# Patient Record
Sex: MALE | Race: WHITE | NOT HISPANIC OR LATINO | Employment: OTHER | URBAN - METROPOLITAN AREA
[De-identification: names, ages, dates, MRNs, and addresses within clinical notes are randomized per-mention and may not be internally consistent; named-entity substitution may affect disease eponyms.]

---

## 2017-01-06 ENCOUNTER — OFFICE VISIT (OUTPATIENT)
Dept: FAMILY MEDICINE | Facility: CLINIC | Age: 72
End: 2017-01-06
Payer: COMMERCIAL

## 2017-01-06 VITALS
BODY MASS INDEX: 26.27 KG/M2 | HEART RATE: 75 BPM | OXYGEN SATURATION: 96 % | RESPIRATION RATE: 22 BRPM | HEIGHT: 70 IN | DIASTOLIC BLOOD PRESSURE: 62 MMHG | TEMPERATURE: 97.9 F | SYSTOLIC BLOOD PRESSURE: 116 MMHG | WEIGHT: 183.5 LBS

## 2017-01-06 DIAGNOSIS — J44.1 COPD EXACERBATION (H): Primary | ICD-10-CM

## 2017-01-06 PROCEDURE — 99213 OFFICE O/P EST LOW 20 MIN: CPT | Performed by: INTERNAL MEDICINE

## 2017-01-06 RX ORDER — PREDNISONE 20 MG/1
10 TABLET ORAL DAILY
Qty: 10 TABLET | Refills: 0 | Status: SHIPPED | OUTPATIENT
Start: 2017-01-06 | End: 2017-01-18

## 2017-01-06 NOTE — MR AVS SNAPSHOT
After Visit Summary   1/6/2017    Harrison Thomas    MRN: [id number]           Patient Information     Date Of Birth          1945        Visit Information        Provider Department      1/6/2017 10:30 AM Yaneli Dozier MD [facility]        Today's Diagnoses     COPD exacerbation (H)    -  1       Care Instructions    Incruse Ellipta 1 inhalation a day    Prednisone 10 mg daily for 10 days    Continue Symbicort    Take Proair/Albuterol  inhaler 2 puffs 4 times daily if needed or before exercise        Follow-ups after your visit        Your next 10 appointments already scheduled     Jan 18, 2017  9:45 AM   Return Visit with Abimael Fatima MD   [facility] PHYSICIANS [facility] [place] ([facility])    [id number]  [facility] [address] [phone]            May 15, 2017  9:30 AM   [facility]    [address] [facility] [id number] [phone]           Bring a current list of meds and any records pertaining to this visit.  For Physicals, please bring immunization records and any forms needing to be filled out.  Please arrive 10 minutes early to complete paperwork.              Who to contact     If you have questions or need follow up information about today's clinic visit or your schedule please contact [facility] directly at [phone].  Normal or non-critical lab and imaging results will be communicated to you by MyChart, letter or phone within 4 business days after the clinic has received the results. If you do not hear from us within 7 days, please contact the clinic through MyChart or phone. If you have a critical or abnormal lab result, we will notify you by phone as soon as possible.  Submit refill requests through Mod[employer] or call your pharmacy and they will forward the refill request to us. Please allow 3 business days for your  "refill to be completed.          Additional Information About Your Visit        [employer] Information     [employer] gives you secure access to your electronic health record. If you see a primary care provider, you can also send messages to your care team and make appointments. If you have questions, please call your primary care clinic.  If you do not have a primary care provider, please call [phone] and they will assist you.        Care EveryWhere ID     This is your Care EveryWhere ID. This could be used by other organizations to access your [facility] medical records  [id number]        Your Vitals Were     Pulse Temperature Respirations Height BMI (Body Mass Index) Pulse Oximetry    75 97.9  F (36.6  C) (Oral) 22 5' 10\" (1.778 m) 26.33 kg/m2 96%       Blood Pressure from Last 3 Encounters:   01/06/17 116/62   12/22/16 138/70   11/23/16 134/75    Weight from Last 3 Encounters:   01/06/17 183 lb 8 oz (83.235 kg)   12/22/16 180 lb (81.647 kg)   11/23/16 182 lb (82.555 kg)              Today, you had the following     No orders found for display         Today's Medication Changes          These changes are accurate as of: 1/6/17 10:55 AM.  If you have any questions, ask your nurse or doctor.               Start taking these medicines.        Dose/Directions    umeclidinium 62.5 MCG/INH oral inhaler   Commonly known as:  INCRUSE ELLIPTA   Used for:  COPD exacerbation (H)   Started by:  Yaneli Dozier MD        Dose:  1 puff   Inhale 1 puff into the lungs daily   Quantity:  1 Inhaler   Refills:  3         These medicines have changed or have updated prescriptions.        Dose/Directions    * predniSONE 10 MG tablet   Commonly known as:  DELTASONE   This may have changed:  Another medication with the same name was added. Make sure you understand how and when to take each.   Used for:  COPD exacerbation (H)   Changed by:  Yaneli Dozier MD        4 tabs daily for 2 days, then 3 tabs daily for 2 days, then 2 tabs daily " for 2 days, then 1 tab daily for 2 days, then stop   Quantity:  20 tablet   Refills:  0       * predniSONE 20 MG tablet   Commonly known as:  DELTASONE   This may have changed:  You were already taking a medication with the same name, and this prescription was added. Make sure you understand how and when to take each.   Used for:  COPD exacerbation (H)   Changed by:  Yaneli Dozier MD        Dose:  10 mg   Take 0.5 tablets (10 mg) by mouth daily   Quantity:  10 tablet   Refills:  0       * Notice:  This list has 2 medication(s) that are the same as other medications prescribed for you. Read the directions carefully, and ask your doctor or other care provider to review them with you.      Stop taking these medicines if you haven't already. Please contact your care team if you have questions.     cefUROXime 500 MG tablet   Commonly known as:  CEFTIN   Stopped by:  Yaneli Dozier MD           doxycycline 100 MG capsule   Commonly known as:  VIBRAMYCIN   Stopped by:  Yaneli Dozier MD           levalbuterol 0.31 MG/3ML neb solution   Commonly known as:  XOPENEX   Stopped by:  Yaneli Dozier MD                Where to get your medicines      These medications were sent to [facility]/pharmacy #[id number] - [facility] [address] [place] [address]     Phone:  [phone]    - predniSONE 20 MG tablet  - umeclidinium 62.5 MCG/INH oral inhaler             Primary Care Provider Office Phone # Fax #    Yaneli Dozier [phone]       [facility] [address] [address]        Thank you!     Thank you for choosing [facility]  for your care. Our goal is always to provide you with excellent care. Hearing back from our patients is one way we can continue to improve our services. Please take a few minutes to complete the written survey that you may receive in the mail after your visit with us. Thank you!             Your Updated Medication List  - Protect others around you: Learn how to safely use, store and throw away your medicines at www.disposemymeds.org.          This list is accurate as of: 1/6/17 10:55 AM.  Always use your most recent med list.                   Brand Name Dispense Instructions for use    albuterol 108 (90 BASE) MCG/ACT Inhaler    albuterol    25.5 Inhaler    INHALE 2 PUFFS INTO THE LUNGS EVERY 6 HOURS AS NEEDED FOR SHORTNESS OF BREATH / DYSPNEA OR WHEEZING       alfuzosin 10 MG 24 hr tablet    UROXATRAL    90 tablet    Take 1 tablet (10 mg) by mouth daily After a meal       aspirin 81 MG tablet      Take 1 tablet by mouth 2 times daily       budesonide-formoterol 160-4.5 MCG/ACT Inhaler    SYMBICORT    3 Inhaler    Inhale 2 puffs into the lungs 2 times daily       * carisoprodol 350 MG tablet    SOMA    30 tablet    TAKE 1 TABLET BY MOUTH 3 TIMES DAILY AND 1 TABLET AT BEDTIME       * Carisoprodol 250 MG Tabs     120 tablet    TAKE 1 TABLET BY MOUTH 3 TIMES A DAY AS NEEDED       fish oil-omega-3 fatty acids 1000 MG capsule      take one tablet twice daily       hydrochlorothiazide 25 MG tablet    HYDRODIURIL    90 tablet    Take 0.5 tablets (12.5 mg) by mouth daily       HYDROcodone-acetaminophen 5-325 MG per tablet    NORCO    180 tablet    Take 1 tablet by mouth every 4 hours as needed       ipratropium - albuterol 0.5 mg/2.5 mg/3 mL 0.5-2.5 (3) MG/3ML neb solution    DUONEB    3 mL    Take 1 vial (3 mLs) by nebulization once for 1 dose While In clinic       * lisinopril 40 MG tablet    PRINIVIL/ZESTRIL    90 tablet    Take 1 tablet (40 mg) by mouth daily       * lisinopril 40 MG tablet    PRINIVIL/ZESTRIL    90 tablet    TAKE 1 TABLET (40 MG) BY MOUTH DAILY       methimazole 5 MG tablet    TAPAZOLE     Take 5 mg by mouth daily       metoprolol 25 MG 24 hr tablet    TOPROL-XL    90 tablet    Take 1 tablet (25 mg) by mouth daily TAKE 1/2 TABLETS (12.5 MG) BY MOUTH DAILY       MUCINEX 600 MG 12 hr tablet   Generic drug:  guaiFENesin       Take 600 mg by mouth as needed       MULTIVITAMIN PO      Take by mouth daily       nitroglycerin 0.4 MG sublingual tablet    NITROSTAT    30 Tab    ONE TABLET UNDER TONGUE, FOR CHEST PAIN, AS DIRECTED       * order for DME     1 each    Equipment being ordered: Oxygen 2 L NC       * order for DME     1 Units    Equipment being ordered: Inogen One G4 Portable Oxygen Concentrator (8-Cell Battery)       * predniSONE 10 MG tablet    DELTASONE    20 tablet    4 tabs daily for 2 days, then 3 tabs daily for 2 days, then 2 tabs daily for 2 days, then 1 tab daily for 2 days, then stop       * predniSONE 20 MG tablet    DELTASONE    10 tablet    Take 0.5 tablets (10 mg) by mouth daily       * rosuvastatin 10 MG tablet    CRESTOR    90 tablet    Take 1 tablet (10 mg) by mouth daily       * rosuvastatin 10 MG tablet    CRESTOR    90 tablet    TAKE 1 TABLET (10 MG) BY MOUTH DAILY       tadalafil 5 MG tablet    CIALIS    90 tablet    Take 1 tablet (5 mg) by mouth daily Never use with nitroglycerin, terazosin or doxazosin.       umeclidinium 62.5 MCG/INH oral inhaler    INCRUSE ELLIPTA    1 Inhaler    Inhale 1 puff into the lungs daily       * Notice:  This list has 10 medication(s) that are the same as other medications prescribed for you. Read the directions carefully, and ask your doctor or other care provider to review them with you.

## 2017-01-06 NOTE — NURSING NOTE
"Chief Complaint   Patient presents with     RECHECK     COPD        Initial /62 mmHg  Pulse 75  Temp(Src) 97.9  F (36.6  C) (Oral)  Resp 22  Ht 5' 10\" (1.778 m)  Wt 183 lb 8 oz (83.235 kg)  BMI 26.33 kg/m2  SpO2 96% Estimated body mass index is 26.33 kg/(m^2) as calculated from the following:    Height as of this encounter: 5' 10\" (1.778 m).    Weight as of this encounter: 183 lb 8 oz (83.235 kg).  BP completed using cuff size: corry Plunkett CMA January 6, 2017 10:33 AM      "

## 2017-01-06 NOTE — PROGRESS NOTES
"  SUBJECTIVE:                                                    Harrison Thomas is a 71 year old male who presents to clinic today for the following health issues:    Patient got sick in the end of November and has been given Zithromax, prednisone taper after initial doxycycline  He is still not back to normal  He does not have any sinus drainage or phlegm anymore  He wheezes when he exercises  COPD Follow-Up    Symptoms are currently: same    Current fatigue or dyspnea with ambulation: worsened from baseline    Shortness of breath: stable    Increased or change in Cough/Sputum: No    Fever(s): No        Any ER/UC or hospital admissions since your last visit? No     History   Smoking status     Former Smoker -- 1.50 packs/day for 30 years     Types: Cigarettes     Quit date: 01/01/1999   Smokeless tobacco     Never Used     Comment: 60 PACK YEARS, quit 2002     No results found for this basename: FEV1, [id number]       Amount of exercise or physical activity: None    Problems taking medications regularly: No    Medication side effects: none    Diet: regular (no restrictions)        Problem list and histories reviewed & adjusted, as indicated.  Additional history: as documented    Problem list, Medication list, Allergies, and Medical/Social/Surgical histories reviewed in [facility] and updated as appropriate.    ROS:  Constitutional, HEENT, cardiovascular, pulmonary, gi and gu systems are negative, except as otherwise noted.    OBJECTIVE:                                                    /62 mmHg  Pulse 75  Temp(Src) 97.9  F (36.6  C) (Oral)  Resp 22  Ht 5' 10\" (1.778 m)  Wt 183 lb 8 oz (83.235 kg)  BMI 26.33 kg/m2  SpO2 96%  Body mass index is 26.33 kg/(m^2).  GENERAL: healthy, alert and no distress  NECK: no adenopathy, no asymmetry, masses, or scars and thyroid normal to palpation  RESP: lungs clear to auscultation - no rales, rhonchi or wheezes  CV: regular rate and rhythm, normal S1 S2, no S3 or S4, no " murmur, click or rub, no peripheral edema and peripheral pulses strong  ABDOMEN: soft, nontender, no hepatosplenomegaly, no masses and bowel sounds normal  MS: no gross musculoskeletal defects noted, no edema    HEENT exam is normal      ASSESSMENT/PLAN:                                                      I discussed with patient that we need to add Spiriva  He did not like that option before  We will try as below and he will update me in one week      Patient Instructions   Incruse Ellipta 1 inhalation a day    Prednisone 10 mg daily for 10 days    Continue Symbicort    Take Proair/Albuterol  inhaler 2 puffs 4 times daily if needed or before exercise            ICD-10-CM    1. COPD exacerbation (H) J44.1 predniSONE (DELTASONE) 20 MG tablet     umeclidinium (INCRUSE ELLIPTA) 62.5 MCG/INH oral inhaler       We might have to use daily azithromycin  He might need to see pulmonologist    Yaneli Dozier MD  [facility]

## 2017-01-06 NOTE — PATIENT INSTRUCTIONS
Incruse Ellipta 1 inhalation a day    Prednisone 10 mg daily for 10 days    Continue Symbicort    Take Proair/Albuterol  inhaler 2 puffs 4 times daily if needed or before exercise

## 2017-01-13 ENCOUNTER — TELEPHONE (OUTPATIENT)
Dept: FAMILY MEDICINE | Facility: CLINIC | Age: 72
End: 2017-01-13

## 2017-01-13 NOTE — TELEPHONE ENCOUNTER
Pt called back, I did schedule him in for Monday for a MA Oxygen check per Tana below.   Will need to notify Trey when test is complete.     Shy Katz RN

## 2017-01-13 NOTE — TELEPHONE ENCOUNTER
Trey did call back, Pt will need to have O2 testing in the clinic, however does not have to see provider for this per their policy.     1. Test Pt's oxygen on RA at rest.   2. If the Pt's O2 sat's fall below 89% at RA, Put O2 on see what Liters are needed to get sat's above 89%.  3. Walk Pt without O2 on RA.   4. If Pt's O2 sat's fall below 89% on RA, Place O2 on and see what Liters are needed to get sat's above 89%.     Per GRISELDA Fajardo, its ok for Pt to come in for MA only visit to have his O2 tested today. I called Pt to see if he can come in today, no answer, Left message. If Pt does call back, please help him schedule MA only O2 check.     If/When Pt does make this appointment, Trey from [facility] would need to be notified as well. She can be reached at [phone].     Shy Katz RN

## 2017-01-13 NOTE — TELEPHONE ENCOUNTER
Trey from  Oxygen called to request that PCP or DOD sign oxygen orders for the Pt.   Trey states they are going to try and use the 12/22/16 OV notes discussing COPD and O2 use to submit to insurance.   If there are any issues with this, they will call back and let us know.     Trey will be sending down O2 script for DOD to sign, will route to PCP and DOD as FYI.     Shy Katz RN

## 2017-01-13 NOTE — TELEPHONE ENCOUNTER
Reason for Call:  Other Patient needs testing     Detailed comments: Patient needs oxygen saturation testing to be done in clinic     Phone Number Patient can be reached at: Other phone number:  PURA Yang Ph. [phone]    Best Time: anytime    Can we leave a detailed message on this number? YES    Call taken on 1/13/2017 at 8:27 AM by Jason Patton

## 2017-01-16 ENCOUNTER — ALLIED HEALTH/NURSE VISIT (OUTPATIENT)
Dept: NURSING | Facility: CLINIC | Age: 72
End: 2017-01-16
Payer: COMMERCIAL

## 2017-01-16 VITALS — HEART RATE: 72 BPM | OXYGEN SATURATION: 96 %

## 2017-01-16 DIAGNOSIS — R06.02 SOB (SHORTNESS OF BREATH): Primary | ICD-10-CM

## 2017-01-16 PROCEDURE — 99207 ZZC NO CHARGE NURSE ONLY: CPT

## 2017-01-16 NOTE — PROGRESS NOTES
Patient presents for O2 sat. States O2 levels have been very stable in the 90's at home since recent visit. No current complaints. Wheezing and SOB which is not new for patient. Denies any Chest pain       Took patient for walk around clinic-2 laps. O2 got down to 91 toward the end of the walk. When we sat down the O2 sat jumped back up to around 94. The first lap O2 help stable at 95%. Patient reported increase SOB and wheezing after walk. Reports if kept walking patient would have gone down to 80's for O2.    No chest pain even after walk    Advised patient we will forward this visit to PCP and will CALL patient with plan.    Advised if O2 gets too low or patient develops chest pain or anything to call and ask to triage nurse or go to ER/UC

## 2017-01-16 NOTE — PROGRESS NOTES
rec he have team office visit if shortness of breath is worsening or having chest pain or sats dropping, otherwise follow up with Dr. Dozier when back    Richy Ramírez M.D.

## 2017-01-18 ENCOUNTER — OFFICE VISIT (OUTPATIENT)
Dept: CARDIOLOGY | Facility: CLINIC | Age: 72
End: 2017-01-18
Attending: INTERNAL MEDICINE
Payer: COMMERCIAL

## 2017-01-18 VITALS
HEART RATE: 74 BPM | BODY MASS INDEX: 26.48 KG/M2 | WEIGHT: 185 LBS | SYSTOLIC BLOOD PRESSURE: 124 MMHG | HEIGHT: 70 IN | OXYGEN SATURATION: 95 % | DIASTOLIC BLOOD PRESSURE: 60 MMHG

## 2017-01-18 DIAGNOSIS — E78.5 HYPERLIPIDEMIA LDL GOAL <100: ICD-10-CM

## 2017-01-18 DIAGNOSIS — I25.10 CORONARY ARTERY DISEASE INVOLVING NATIVE CORONARY ARTERY OF NATIVE HEART WITHOUT ANGINA PECTORIS: ICD-10-CM

## 2017-01-18 PROCEDURE — 99214 OFFICE O/P EST MOD 30 MIN: CPT | Performed by: INTERNAL MEDICINE

## 2017-01-18 RX ORDER — NITROGLYCERIN 0.4 MG/1
0.4 TABLET SUBLINGUAL SEE ADMIN INSTRUCTIONS
Qty: 30 TABLET | Refills: 5 | Status: SHIPPED | OUTPATIENT
Start: 2017-01-18 | End: 2020-06-09
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
## 2017-01-18 NOTE — PROGRESS NOTES
2017      Yaneli Dozier MD   [facility]   [address], [address]   [place]  [address]      RE: Harrison Thomas   MRN: [id number]   : 1945      Dear Yaneli:      I had the pleasure of seeing Harrison Thomas in Cardiology Clinic in followup.  He is a pleasant 71-year-old male with past medical history of coronary artery disease.  He had a circumflex stent in .  He had repeat angiography in  which showed moderate disease in the LAD and diagonal branch.  His last nuclear stress test done in  showed a small partially reversible inferolateral and inferior defect consistent with nontransmural scar with mild janes-infarct ischemia.  This was done with Lexiscan.  Since there was no significant ischemia and he had no chest pain, this was managed medically.  He has a history of hypothyroidism which has been now treated.  He also has COPD from his history of smoking.  Unfortunately, he has had worsening shortness of breath for the last few months with cough and has had 2 courses of antibiotics.  He finally thinks that he is getting better.      PHYSICAL EXAMINATION:   VITAL SIGNS:  His blood pressure was 124/60, pulse 74 per minute and regular.   CARDIAC:  Regular S1, S2 with no murmurs.    LUNGS:  He does have mild expiratory wheezes.       His last LDL was 72.  His creatinine has been normal.  He remains on rosuvastatin 10 mg daily.      IMPRESSION:   1.  Coronary artery disease, stable with no angina.  He has shortness of breath likely related to his pulmonary condition.  He has been working with Dr. Dozier for improving his overall breathing.  His cholesterol is adequate and I would continue rosuvastatin.  He remains on aspirin.  He is also on low-dose metoprolol.  If his wheezing worsens, metoprolol can be decreased or discontinued.  I will let Dr. Dozier adjust that as needed.   2.  Hypertension.  Blood pressure is well controlled.  He is on lisinopril, metoprolol and  hydrochlorothiazide.   3.  Hyperlipidemia, continue rosuvastatin.      I did prescribe sublingual nitroglycerin for emergency use but reminded him not to take nitrates within 48-72 hours of using Cialis.  He says he has not been using Cialis because of the cost.      We will see him back on an annual basis.  I have asked him to call me if there is worsening chest pain or shortness of breath.  I have recommended an echocardiogram prior to visit with me to reassess wall motion, ejection fraction and valves.      Sincerely,      MD HAN Pond MD             D: 2017 10:37   T: 2017 15:41   MT: CLARKE      Name:     ABDIRASHID EPSTEIN   MRN:      [id number]        Account:      [id number]   :      1945           Service Date: 2017      Document: [id number]

## 2017-01-18 NOTE — Clinical Note
2017      Yaneli Dozier MD   [facility]   [address], [address]   [place]  [address]      RE: Harrison Thomas   MRN: [id number]   : 1945      Dear Yaneli:      I had the pleasure of seeing Harrison Thomas in Cardiology Clinic in followup.  He is a pleasant 71-year-old male with past medical history of coronary artery disease.  He had a circumflex stent in .  He had repeat angiography in  which showed moderate disease in the LAD and diagonal branch.  His last nuclear stress test done in  showed a small partially reversible inferolateral and inferior defect consistent with nontransmural scar with mild janes-infarct ischemia.  This was done with Lexiscan.  Since there was no significant ischemia and he had no chest pain, this was managed medically.  He has a history of hypothyroidism which has been now treated.  He also has COPD from his history of smoking.  Unfortunately, he has had worsening shortness of breath for the last few months with cough and has had 2 courses of antibiotics.  He finally thinks that he is getting better.      PHYSICAL EXAMINATION:   VITAL SIGNS:  His blood pressure was 124/60, pulse 74 per minute and regular.   CARDIAC:  Regular S1, S2 with no murmurs.    LUNGS:  He does have mild expiratory wheezes.       His last LDL was 72.  His creatinine has been normal.  He remains on rosuvastatin 10 mg daily.      IMPRESSION:   1.  Coronary artery disease, stable with no angina.  He has shortness of breath likely related to his pulmonary condition.  He has been working with Dr. Dozier for improving his overall breathing.  His cholesterol is adequate and I would continue rosuvastatin.  He remains on aspirin.  He is also on low-dose metoprolol.  If his wheezing worsens, metoprolol can be decreased or discontinued.  I will let Dr. Dozier adjust that as needed.   2.  Hypertension.  Blood pressure is well controlled.  He is on lisinopril, metoprolol and  hydrochlorothiazide.   3.  Hyperlipidemia, continue rosuvastatin.      I did prescribe sublingual nitroglycerin for emergency use but reminded him not to take nitrates within 48-72 hours of using Cialis.  He says he has not been using Cialis because of the cost.      We will see him back on an annual basis.  I have asked him to call me if there is worsening chest pain or shortness of breath.  I have recommended an echocardiogram prior to visit with me to reassess wall motion, ejection fraction and valves.      Sincerely,      Abimael Fatima MD

## 2017-02-27 ENCOUNTER — OFFICE VISIT (OUTPATIENT)
Dept: FAMILY MEDICINE | Facility: CLINIC | Age: 72
End: 2017-02-27
Payer: COMMERCIAL

## 2017-02-27 VITALS
DIASTOLIC BLOOD PRESSURE: 68 MMHG | BODY MASS INDEX: 26.48 KG/M2 | HEIGHT: 70 IN | HEART RATE: 69 BPM | TEMPERATURE: 97.7 F | OXYGEN SATURATION: 94 % | WEIGHT: 185 LBS | SYSTOLIC BLOOD PRESSURE: 125 MMHG

## 2017-02-27 DIAGNOSIS — D84.9 IMMUNODEFICIENCY (H): ICD-10-CM

## 2017-02-27 DIAGNOSIS — T50.905A MEDICATION REACTION, INITIAL ENCOUNTER: ICD-10-CM

## 2017-02-27 DIAGNOSIS — M89.8X9 BONE PAIN: ICD-10-CM

## 2017-02-27 DIAGNOSIS — D47.2 MONOCLONAL PARAPROTEINEMIA: ICD-10-CM

## 2017-02-27 DIAGNOSIS — M48.062 SPINAL STENOSIS OF LUMBAR REGION WITH NEUROGENIC CLAUDICATION: Primary | ICD-10-CM

## 2017-02-27 DIAGNOSIS — J44.9 CHRONIC OBSTRUCTIVE PULMONARY DISEASE, UNSPECIFIED COPD TYPE (H): ICD-10-CM

## 2017-02-27 DIAGNOSIS — I10 ESSENTIAL HYPERTENSION, BENIGN: ICD-10-CM

## 2017-02-27 LAB
ALBUMIN SERPL-MCNC: 3.9 G/DL (ref 3.4–5)
ALP SERPL-CCNC: 100 U/L (ref 40–150)
ALT SERPL W P-5'-P-CCNC: 35 U/L (ref 0–70)
ANION GAP SERPL CALCULATED.3IONS-SCNC: 9 MMOL/L (ref 3–14)
AST SERPL W P-5'-P-CCNC: 25 U/L (ref 0–45)
BILIRUB SERPL-MCNC: 0.9 MG/DL (ref 0.2–1.3)
BUN SERPL-MCNC: 13 MG/DL (ref 7–30)
CALCIUM SERPL-MCNC: 9.3 MG/DL (ref 8.5–10.1)
CHLORIDE SERPL-SCNC: 105 MMOL/L (ref 94–109)
CK SERPL-CCNC: 136 U/L (ref 30–300)
CO2 SERPL-SCNC: 30 MMOL/L (ref 20–32)
CREAT SERPL-MCNC: 0.97 MG/DL (ref 0.66–1.25)
ERYTHROCYTE [DISTWIDTH] IN BLOOD BY AUTOMATED COUNT: 13.7 % (ref 10–15)
ERYTHROCYTE [SEDIMENTATION RATE] IN BLOOD BY WESTERGREN METHOD: 8 MM/H (ref 0–20)
GFR SERPL CREATININE-BSD FRML MDRD: 76 ML/MIN/1.7M2
GLUCOSE SERPL-MCNC: 93 MG/DL (ref 70–99)
HCT VFR BLD AUTO: 45.5 % (ref 40–53)
HGB BLD-MCNC: 15 G/DL (ref 13.3–17.7)
MCH RBC QN AUTO: 30.5 PG (ref 26.5–33)
MCHC RBC AUTO-ENTMCNC: 33 G/DL (ref 31.5–36.5)
MCV RBC AUTO: 93 FL (ref 78–100)
PLATELET # BLD AUTO: 175 10E9/L (ref 150–450)
POTASSIUM SERPL-SCNC: 4.4 MMOL/L (ref 3.4–5.3)
PROT SERPL-MCNC: 7.4 G/DL (ref 6.8–8.8)
RBC # BLD AUTO: 4.91 10E12/L (ref 4.4–5.9)
SODIUM SERPL-SCNC: 144 MMOL/L (ref 133–144)
WBC # BLD AUTO: 7.5 10E9/L (ref 4–11)

## 2017-02-27 PROCEDURE — 36415 COLL VENOUS BLD VENIPUNCTURE: CPT | Performed by: INTERNAL MEDICINE

## 2017-02-27 PROCEDURE — 85652 RBC SED RATE AUTOMATED: CPT | Performed by: INTERNAL MEDICINE

## 2017-02-27 PROCEDURE — 82550 ASSAY OF CK (CPK): CPT | Performed by: INTERNAL MEDICINE

## 2017-02-27 PROCEDURE — 80053 COMPREHEN METABOLIC PANEL: CPT | Performed by: INTERNAL MEDICINE

## 2017-02-27 PROCEDURE — 85027 COMPLETE CBC AUTOMATED: CPT | Performed by: INTERNAL MEDICINE

## 2017-02-27 PROCEDURE — 00000402 ZZHCL STATISTIC TOTAL PROTEIN: Performed by: INTERNAL MEDICINE

## 2017-02-27 PROCEDURE — 84165 PROTEIN E-PHORESIS SERUM: CPT | Performed by: INTERNAL MEDICINE

## 2017-02-27 PROCEDURE — 86335 IMMUNFIX E-PHORSIS/URINE/CSF: CPT | Performed by: INTERNAL MEDICINE

## 2017-02-27 PROCEDURE — 99214 OFFICE O/P EST MOD 30 MIN: CPT | Performed by: INTERNAL MEDICINE

## 2017-02-27 RX ORDER — HYDROCODONE BITARTRATE AND ACETAMINOPHEN 5; 325 MG/1; MG/1
1 TABLET ORAL EVERY 4 HOURS PRN
Qty: 180 TABLET | Refills: 0 | Status: SHIPPED | OUTPATIENT
Start: 2017-02-27 | End: 2017-04-19

## 2017-02-27 RX ORDER — TRIAMCINOLONE ACETONIDE 1 MG/ML
LOTION TOPICAL
Qty: 60 ML | Refills: 0 | Status: SHIPPED | OUTPATIENT
Start: 2017-02-27 | End: 2017-12-07

## 2017-02-27 NOTE — PATIENT INSTRUCTIONS
Please see Dr Magaña or Dr Swanson in Pulmonology, or Dr Handy  Repeat spirometry will be needed    Kenalog twice daily on legs, if rash persists, please let me?endocrinology know, Tapazole is notorious too for rashes

## 2017-02-27 NOTE — MR AVS SNAPSHOT
After Visit Summary   2/27/2017    Harrison Thomas    MRN: [id number]           Patient Information     Date Of Birth          1945        Visit Information        Provider Department      2/27/2017 10:00 AM Yaneli Dozier MD [facility]        Today's Diagnoses     Spinal stenosis of lumbar region with neurogenic claudication    -  1    Essential hypertension, benign        Chronic obstructive pulmonary disease, unspecified COPD type (H)        Monoclonal paraproteinemia        Immunodeficiency (H)        Bone pain        Medication reaction, initial encounter          Care Instructions    Please see Dr Magaña or Dr Swanson in Pulmonology, or Dr Handy  Repeat spirometry will be needed    Kenalog twice daily on legs, if rash persists, please let me?endocrinology know, Tapazole is notorious too for rashes        Follow-ups after your visit        Additional Services     PULMONARY MEDICINE REFERRAL       Your provider has referred you to: [facility]: Lung Disease and Pulmonary Clinic [facility] [phone]   [email or web address]    Please be aware that coverage of these services is subject to the terms and limitations of your health insurance plan.  Call member services at your health plan with any benefit or coverage questions.      Please bring the following with you to your appointment:    (1) Any X-Rays, CTs or MRIs which have been performed.  Contact the facility where they were done to arrange for  prior to your scheduled appointment.    (2) List of current medications   (3) This referral request   (4) Any documents/labs given to you for this referral                  Follow-up notes from your care team     Return in about 4 weeks (around 3/27/2017) for BP Recheck.      Your next 10 appointments already scheduled     May 15, 2017  9:30 AM CDT   Office Visit with MD Lawanda [place] Saba Sommer ([facility]  "[facility])    [address] [facility] [address] [phone]           Bring a current list of meds and any records pertaining to this visit.  For Physicals, please bring immunization records and any forms needing to be filled out.  Please arrive 10 minutes early to complete paperwork.              Who to contact     If you have questions or need follow up information about today's clinic visit or your schedule please contact [facility] directly at [phone].  Normal or non-critical lab and imaging results will be communicated to you by [employer], letter or phone within 4 business days after the clinic has received the results. If you do not hear from us within 7 days, please contact the clinic through [employer] or phone. If you have a critical or abnormal lab result, we will notify you by phone as soon as possible.  Submit refill requests through [employer] or call your pharmacy and they will forward the refill request to us. Please allow 3 business days for your refill to be completed.          Additional Information About Your Visit        [employer] Information     [employer] gives you secure access to your electronic health record. If you see a primary care provider, you can also send messages to your care team and make appointments. If you have questions, please call your primary care clinic.  If you do not have a primary care provider, please call [phone] and they will assist you.        Care EveryWhere ID     This is your Care EveryWhere ID. This could be used by other organizations to access your [facility] medical records  [id number]        Your Vitals Were     Pulse Temperature Height Pulse Oximetry BMI (Body Mass Index)       69 97.7  F (36.5  C) (Oral) 5' 10\" (1.778 m) 94% 26.54 kg/m2        Blood Pressure from Last 3 Encounters:   02/27/17 125/68   01/18/17 124/60   01/06/17 116/62    Weight from Last 3 Encounters:   02/27/17 185 lb (83.9 kg)   01/18/17 185 lb (83.9 kg)   01/06/17 183 lb " 8 oz (83.2 kg)              We Performed the Following     CBC with platelets     CK total     Comprehensive metabolic panel     Erythrocyte sedimentation rate auto     Protein electrophoresis     Protein immunofixation urine     PULMONARY MEDICINE REFERRAL          Today's Medication Changes          These changes are accurate as of: 2/27/17 10:22 AM.  If you have any questions, ask your nurse or doctor.               Start taking these medicines.        Dose/Directions    triamcinolone 0.1 % lotion   Commonly known as:  KENALOG   Used for:  Medication reaction, initial encounter   Started by:  Yaneli Dozier MD        Apply sparingly to affected area three times daily as needed.   Quantity:  60 mL   Refills:  0         Stop taking these medicines if you haven't already. Please contact your care team if you have questions.     hydrochlorothiazide 25 MG tablet   Commonly known as:  HYDRODIURIL   Stopped by:  Yaneli Dozier MD                Where to get your medicines      These medications were sent to [facility]/pharmacy #[id number] [place], [place] - [address] [place] [address]  [address] [place] [address]     Phone:  [phone]     triamcinolone 0.1 % lotion         Some of these will need a paper prescription and others can be bought over the counter.  Ask your nurse if you have questions.     Bring a paper prescription for each of these medications     HYDROcodone-acetaminophen 5-325 MG per tablet                Primary Care Provider Office Phone # Fax #    Yaneli Dozier [phone]       [facility] [address] [facility] [address]  [facility] [address]        Thank you!     Thank you for choosing [facility]  for your care. Our goal is always to provide you with excellent care. Hearing back from our patients is one way we can continue to improve our services. Please take a few minutes to complete the written survey that you may receive in the mail after your visit with us. Thank  you!             Your Updated Medication List - Protect others around you: Learn how to safely use, store and throw away your medicines at www.disposemymeds.org.          This list is accurate as of: 2/27/17 10:22 AM.  Always use your most recent med list.                   Brand Name Dispense Instructions for use    albuterol 108 (90 BASE) MCG/ACT Inhaler    albuterol    25.5 Inhaler    INHALE 2 PUFFS INTO THE LUNGS EVERY 6 HOURS AS NEEDED FOR SHORTNESS OF BREATH / DYSPNEA OR WHEEZING       alfuzosin 10 MG 24 hr tablet    UROXATRAL    90 tablet    Take 1 tablet (10 mg) by mouth daily After a meal       aspirin 81 MG tablet      Take 1 tablet by mouth 2 times daily       budesonide-formoterol 160-4.5 MCG/ACT Inhaler    SYMBICORT    3 Inhaler    Inhale 2 puffs into the lungs 2 times daily       carisoprodol 350 MG tablet    SOMA    30 tablet    TAKE 1 TABLET BY MOUTH 3 TIMES DAILY AND 1 TABLET AT BEDTIME       fish oil-omega-3 fatty acids 1000 MG capsule      take one tablet twice daily       HYDROcodone-acetaminophen 5-325 MG per tablet    NORCO    180 tablet    Take 1 tablet by mouth every 4 hours as needed       lisinopril 40 MG tablet    PRINIVIL/ZESTRIL    90 tablet    Take 1 tablet (40 mg) by mouth daily       methimazole 5 MG tablet    TAPAZOLE     Take 5 mg by mouth every other day       metoprolol 25 MG 24 hr tablet    TOPROL-XL    90 tablet    Take 1 tablet (25 mg) by mouth daily TAKE 1/2 TABLETS (12.5 MG) BY MOUTH DAILY       MULTIVITAMIN PO      Take by mouth daily       nitroglycerin 0.4 MG sublingual tablet    NITROSTAT    30 tablet    Place 1 tablet (0.4 mg) under the tongue See Admin Instructions for chest pain       * order for DME     1 each    Equipment being ordered: Oxygen 2 L NC       * order for DME     1 Units    Equipment being ordered: [employer] One G4 Portable Oxygen Concentrator (8-Cell Battery)       * order for DME     1 each    Equipment being ordered: Home oxygen 2 L by nasal cannula        rosuvastatin 10 MG tablet    CRESTOR    90 tablet    TAKE 1 TABLET (10 MG) BY MOUTH DAILY       tadalafil 5 MG tablet    CIALIS    90 tablet    Take 1 tablet (5 mg) by mouth daily Never use with nitroglycerin, terazosin or doxazosin.       triamcinolone 0.1 % lotion    KENALOG    60 mL    Apply sparingly to affected area three times daily as needed.       umeclidinium 62.5 MCG/INH oral inhaler    INCRUSE ELLIPTA    1 Inhaler    Inhale 1 puff into the lungs daily       * Notice:  This list has 3 medication(s) that are the same as other medications prescribed for you. Read the directions carefully, and ask your doctor or other care provider to review them with you.

## 2017-02-27 NOTE — NURSING NOTE
"Chief Complaint   Patient presents with     Follow Up For     Hypertension, COPD, HYPERLIPIDEMIA       Initial /68 (BP Location: Left arm, Patient Position: Chair, Cuff Size: Adult Regular)  Pulse 69  Temp 97.7  F (36.5  C) (Oral)  Ht 5' 10\" (1.778 m)  Wt 185 lb (83.9 kg)  SpO2 94%  BMI 26.54 kg/m2 Estimated body mass index is 26.54 kg/(m^2) as calculated from the following:    Height as of this encounter: 5' 10\" (1.778 m).    Weight as of this encounter: 185 lb (83.9 kg).  Medication Reconciliation: complete.    Izabella Gipson CMA      "

## 2017-02-27 NOTE — PROGRESS NOTES
"  SUBJECTIVE:                                                    Harrison Thomas is a 71 year old male who presents to clinic today for the following health issues:      Hyperlipidemia Follow-Up      Rate your low fat/cholesterol diet?: good    Taking statin?  Yes, no muscle aches from statin    Other lipid medications/supplements?:  none     Hypertension Follow-up      Outpatient blood pressures are being checked at home.  Results are 130/70.    Low Salt Diet: no added salt     COPD Follow-Up    Symptoms are currently: improved    Current fatigue or dyspnea with ambulation: stable     Shortness of breath: stable    Increased or change in Cough/Sputum: No    Fever(s): No    Baseline ambulation without stopping to rest     Any ER/UC or hospital admissions since your last visit? No     History   Smoking Status     Former Smoker     Packs/day: 1.50     Years: 30.00     Types: Cigarettes     Quit date: 1/1/1999   Smokeless Tobacco     Never Used     Comment: 60 PACK YEARS, quit 2002     No results found for: FEV1, [id number]       Amount of exercise or physical activity: 2-3 days/week for an average of 15-30 minutes    Problems taking medications regularly: No    Medication side effects: none  Diet: low salt      Problem list and histories reviewed & adjusted, as indicated.  Additional history: as documented    Problem list, Medication list, Allergies, and Medical/Social/Surgical histories reviewed in EPIC and updated as appropriate.    ROS:  Constitutional, HEENT, cardiovascular, pulmonary, gi and gu systems are negative, except as otherwise noted.    OBJECTIVE:                                                    /68 (BP Location: Left arm, Patient Position: Chair, Cuff Size: Adult Regular)  Pulse 69  Temp 97.7  F (36.5  C) (Oral)  Ht 5' 10\" (1.778 m)  Wt 185 lb (83.9 kg)  SpO2 94%  BMI 26.54 kg/m2  Body mass index is 26.54 kg/(m^2).  GENERAL: healthy, alert and no distress  HENT: ear canals and TM's normal, nose " and mouth without ulcers or lesions  NECK: no adenopathy, no asymmetry, masses, or scars and thyroid normal to palpation  RESP: lungs clear to auscultation - no rales, rhonchi or wheezes  CV: regular rate and rhythm, normal S1 S2, no S3 or S4, no murmur, click or rub, no peripheral edema and peripheral pulses strong  ABDOMEN: soft, nontender, no hepatosplenomegaly, no masses and bowel sounds normal  MS: no gross musculoskeletal defects noted, no edema  Rash on the legs is observed       ASSESSMENT/PLAN:                                                             1. Spinal stenosis of lumbar region with neurogenic claudication  Patient has chronic back pain  - HYDROcodone-acetaminophen (NORCO) 5-325 MG per tablet; Take 1 tablet by mouth every 4 hours as needed  Dispense: 180 tablet; Refill: 0    2. Essential hypertension, benign  Blood pressure is controlled with metoprolol and hydrochlorothiazide is stopped  - Comprehensive metabolic panel    3. Chronic obstructive pulmonary disease, unspecified COPD type (H)  FEV1 FVC is 74% with frequent flareups  He is on maximal medical management  - PULMONARY MEDICINE REFERRAL    4. Monoclonal paraproteinemia    - Comprehensive metabolic panel  - Protein electrophoresis    5. 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 Bone pain  Pain is in the legs and could be from arthritis  - Comprehensive metabolic panel  - Protein electrophoresis  - Protein immunofixation urine  - CBC with platelets  - Erythrocyte sedimentation rate auto  - CK total    7. Medication reaction, initial encounter  Patient Instructions   Please see Dr Magaña or Dr Swanson in Pulmonology, or Dr Handy  Repeat spirometry will be needed    Kenalog twice daily on legs, if rash persists, please let me?endocrinology know, Tapazole is notorious too for rashes      - triamcinolone (KENALOG) 0.1 % lotion; Apply sparingly to  affected area three times daily as needed.  Dispense: 60 mL; Refill: 0    He will see me in 3 months as well    Yaneli Dozier MD  [facility]

## 2017-03-01 LAB
ALBUMIN SERPL ELPH-MCNC: 4.2 G/DL (ref 3.7–5.1)
ALPHA1 GLOB SERPL ELPH-MCNC: 0.3 G/DL (ref 0.2–0.4)
ALPHA2 GLOB SERPL ELPH-MCNC: 0.7 G/DL (ref 0.5–0.9)
B-GLOBULIN SERPL ELPH-MCNC: 1 G/DL (ref 0.6–1)
GAMMA GLOB SERPL ELPH-MCNC: 1 G/DL (ref 0.7–1.6)
M PROTEIN SERPL ELPH-MCNC: 0 G/DL
PROT ELPH PNL UR ELPH: NORMAL
PROT PATTERN SERPL ELPH-IMP: NORMAL

## 2017-04-12 DIAGNOSIS — J44.1 COPD EXACERBATION (H): ICD-10-CM

## 2017-04-12 RX ORDER — AZITHROMYCIN 250 MG/1
TABLET, FILM COATED ORAL
Qty: 30 TABLET | Refills: 1 | OUTPATIENT
Start: 2017-04-12

## 2017-04-12 NOTE — TELEPHONE ENCOUNTER
Pending Prescriptions:                       Disp   Refills    azithromycin (ZITHROMAX) 250 MG tablet [P*30 tab*1            Sig: TAKE 1 TABLET (250 MG) BY MOUTH DAILY            Last Office Visit with [facility], [facility] or [facility] prescribing provider: 2/27/17  Future Office visit:    Next 5 appointments (look out 90 days)     May 26, 2017  9:30 AM CDT   [facility]    [address] [facility] [address] [phone]                   Routing refill request to provider for review/approval because:  Drug not active on patient's medication list

## 2017-04-12 NOTE — TELEPHONE ENCOUNTER
Started by pcp on 1/12/17 with note to take daily, then stopped by cards on 1/18/17 with therapy complete.  Should this Rx be ongoing?  Nora Rojo RN

## 2017-04-13 RX ORDER — AZITHROMYCIN 250 MG/1
TABLET, FILM COATED ORAL
Qty: 6 TABLET | Refills: 0 | Status: SHIPPED | OUTPATIENT
Start: 2017-04-13 | End: 2017-05-26

## 2017-04-13 RX ORDER — PREDNISONE 10 MG/1
TABLET ORAL
Qty: 20 TABLET | Refills: 0 | Status: SHIPPED | OUTPATIENT
Start: 2017-04-13 | End: 2017-05-26

## 2017-04-13 NOTE — TELEPHONE ENCOUNTER
Pt is going out of town tomorrow, and will be gone for a week to [place].  Pt reports when going out of town, PCP usually prescribes azithro and prednisone 20mg incase COPD issues.    OSMANI for Dr. Dozier - Will be seeing Dr. Handy, Pulmonology 6/1/17    Pended Zpak and Previous prednisone taper for review  Nora Rojo RN

## 2017-04-19 DIAGNOSIS — M48.062 SPINAL STENOSIS OF LUMBAR REGION WITH NEUROGENIC CLAUDICATION: ICD-10-CM

## 2017-04-19 RX ORDER — HYDROCODONE BITARTRATE AND ACETAMINOPHEN 5; 325 MG/1; MG/1
1 TABLET ORAL EVERY 4 HOURS PRN
Qty: 180 TABLET | Refills: 0 | Status: SHIPPED | OUTPATIENT
Start: 2017-04-19 | End: 2017-05-26

## 2017-04-19 NOTE — TELEPHONE ENCOUNTER
Reason for Call:  Medication or medication refill:    Do you use a [facility]?  Name of the pharmacy and phone number for the current request:      Name of the medication requested: HYDROcodone-acetaminophen (NORCO) 5-325 MG per tablet    Other request: pt has enough till Friday    Can we leave a detailed message on this number? YES    Phone number patient can be reached at: Home number on file [phone] (home)    Best Time:     Call taken on 4/19/2017 at 12:22 PM by Emani Davalos

## 2017-04-19 NOTE — TELEPHONE ENCOUNTER
Controlled Substance Refill Request for   HYDROcodone-acetaminophen (NORCO) 5-325 MG per tablet 180 tablet 0 2/27/2017     Problem List Complete:  Yes    Last Written Prescription Date:  02/27/2017  Last Fill Quantity: 180,   # refills: 0    Last Office Visit with [place] primary care provider: 02/27/2017        Future Office visit:   Next 5 appointments (look out 90 days)     May 26, 2017  9:30 AM CDT   Office Visit with Yaneli Dozier MD   [facility]    [address] [facility] [address]   [phone]                  Controlled substance agreement on file: Yes:  Date 11/18/2016.     Processing:  Patient will  in clinic  done on 4/6/2016   checked in past 6 months?  No, route to RN

## 2017-05-26 ENCOUNTER — PRE VISIT (OUTPATIENT)
Dept: PULMONOLOGY | Facility: CLINIC | Age: 72
End: 2017-05-26

## 2017-05-26 ENCOUNTER — OFFICE VISIT (OUTPATIENT)
Dept: FAMILY MEDICINE | Facility: CLINIC | Age: 72
End: 2017-05-26
Payer: COMMERCIAL

## 2017-05-26 VITALS
DIASTOLIC BLOOD PRESSURE: 73 MMHG | TEMPERATURE: 97.6 F | WEIGHT: 181 LBS | HEART RATE: 59 BPM | BODY MASS INDEX: 25.91 KG/M2 | HEIGHT: 70 IN | RESPIRATION RATE: 18 BRPM | OXYGEN SATURATION: 94 % | SYSTOLIC BLOOD PRESSURE: 130 MMHG

## 2017-05-26 DIAGNOSIS — J44.9 CHRONIC OBSTRUCTIVE PULMONARY DISEASE, UNSPECIFIED COPD TYPE (H): Primary | ICD-10-CM

## 2017-05-26 DIAGNOSIS — D84.9 IMMUNODEFICIENCY (H): ICD-10-CM

## 2017-05-26 DIAGNOSIS — M48.062 SPINAL STENOSIS OF LUMBAR REGION WITH NEUROGENIC CLAUDICATION: ICD-10-CM

## 2017-05-26 DIAGNOSIS — I10 ESSENTIAL HYPERTENSION, BENIGN: ICD-10-CM

## 2017-05-26 DIAGNOSIS — E78.5 HYPERLIPIDEMIA LDL GOAL <100: ICD-10-CM

## 2017-05-26 DIAGNOSIS — N40.1 BENIGN NON-NODULAR PROSTATIC HYPERPLASIA WITH LOWER URINARY TRACT SYMPTOMS: ICD-10-CM

## 2017-05-26 DIAGNOSIS — E05.90 THYROTOXICOSIS WITHOUT THYROID STORM, UNSPECIFIED THYROTOXICOSIS TYPE: ICD-10-CM

## 2017-05-26 DIAGNOSIS — I25.9 ISCHEMIC HEART DISEASE: ICD-10-CM

## 2017-05-26 LAB
ALBUMIN SERPL-MCNC: 3.5 G/DL (ref 3.4–5)
ALP SERPL-CCNC: 90 U/L (ref 40–150)
ALT SERPL W P-5'-P-CCNC: 26 U/L (ref 0–70)
ANION GAP SERPL CALCULATED.3IONS-SCNC: 6 MMOL/L (ref 3–14)
AST SERPL W P-5'-P-CCNC: 14 U/L (ref 0–45)
BILIRUB SERPL-MCNC: 0.4 MG/DL (ref 0.2–1.3)
BUN SERPL-MCNC: 11 MG/DL (ref 7–30)
CALCIUM SERPL-MCNC: 8.8 MG/DL (ref 8.5–10.1)
CHLORIDE SERPL-SCNC: 109 MMOL/L (ref 94–109)
CHOLEST SERPL-MCNC: 146 MG/DL
CO2 SERPL-SCNC: 27 MMOL/L (ref 20–32)
CREAT SERPL-MCNC: 0.89 MG/DL (ref 0.66–1.25)
ERYTHROCYTE [DISTWIDTH] IN BLOOD BY AUTOMATED COUNT: 14.2 % (ref 10–15)
GFR SERPL CREATININE-BSD FRML MDRD: 85 ML/MIN/1.7M2
GLUCOSE SERPL-MCNC: 95 MG/DL (ref 70–99)
HCT VFR BLD AUTO: 44 % (ref 40–53)
HDLC SERPL-MCNC: 51 MG/DL
HGB BLD-MCNC: 14.6 G/DL (ref 13.3–17.7)
LDLC SERPL CALC-MCNC: 78 MG/DL
MCH RBC QN AUTO: 30.6 PG (ref 26.5–33)
MCHC RBC AUTO-ENTMCNC: 33.2 G/DL (ref 31.5–36.5)
MCV RBC AUTO: 92 FL (ref 78–100)
NONHDLC SERPL-MCNC: 95 MG/DL
PLATELET # BLD AUTO: 159 10E9/L (ref 150–450)
POTASSIUM SERPL-SCNC: 4.4 MMOL/L (ref 3.4–5.3)
PROT SERPL-MCNC: 6.6 G/DL (ref 6.8–8.8)
RBC # BLD AUTO: 4.77 10E12/L (ref 4.4–5.9)
SODIUM SERPL-SCNC: 142 MMOL/L (ref 133–144)
T3FREE SERPL-MCNC: 2.8 PG/ML (ref 2.3–4.2)
T4 FREE SERPL-MCNC: 1.21 NG/DL (ref 0.76–1.46)
TRIGL SERPL-MCNC: 85 MG/DL
TSH SERPL DL<=0.005 MIU/L-ACNC: 0.76 MU/L (ref 0.4–4)
WBC # BLD AUTO: 7.6 10E9/L (ref 4–11)

## 2017-05-26 PROCEDURE — 36415 COLL VENOUS BLD VENIPUNCTURE: CPT | Performed by: INTERNAL MEDICINE

## 2017-05-26 PROCEDURE — 84443 ASSAY THYROID STIM HORMONE: CPT | Performed by: INTERNAL MEDICINE

## 2017-05-26 PROCEDURE — 99214 OFFICE O/P EST MOD 30 MIN: CPT | Performed by: INTERNAL MEDICINE

## 2017-05-26 PROCEDURE — 80061 LIPID PANEL: CPT | Performed by: INTERNAL MEDICINE

## 2017-05-26 PROCEDURE — 84481 FREE ASSAY (FT-3): CPT | Performed by: INTERNAL MEDICINE

## 2017-05-26 PROCEDURE — 84439 ASSAY OF FREE THYROXINE: CPT | Performed by: INTERNAL MEDICINE

## 2017-05-26 PROCEDURE — 80053 COMPREHEN METABOLIC PANEL: CPT | Performed by: INTERNAL MEDICINE

## 2017-05-26 PROCEDURE — 85027 COMPLETE CBC AUTOMATED: CPT | Performed by: INTERNAL MEDICINE

## 2017-05-26 RX ORDER — HYDROCODONE BITARTRATE AND ACETAMINOPHEN 5; 325 MG/1; MG/1
1 TABLET ORAL EVERY 4 HOURS PRN
Qty: 180 TABLET | Refills: 0 | Status: SHIPPED | OUTPATIENT
Start: 2017-05-26 | End: 2017-11-24

## 2017-05-26 RX ORDER — ALFUZOSIN HYDROCHLORIDE 10 MG/1
10 TABLET, EXTENDED RELEASE ORAL DAILY
Qty: 90 TABLET | Refills: 3 | Status: SHIPPED | OUTPATIENT
Start: 2017-05-26 | End: 2018-01-03

## 2017-05-26 RX ORDER — LISINOPRIL 40 MG/1
40 TABLET ORAL DAILY
Qty: 90 TABLET | Refills: 3 | Status: SHIPPED | OUTPATIENT
Start: 2017-05-26 | End: 2017-09-11

## 2017-05-26 NOTE — LETTER
[facility]    05/26/17    Patient: Harrison Thomas  YOB: 1945  Medical Record Number: [id number]                                                                  Controlled Substance Agreement  I understand that my care provider has prescribed controlled substances (narcotics, tranquilizers, and/or stimulants) to help manage my condition(s).  I am taking this medicine to help me function or work.  I know that this is strong medicine.  It could have serious side effects and even cause a dependency on the drug.  If I stop these medicines suddenly, I could have severe withdrawal symptoms.    The risks, benefits, and side effects of these medication(s) were explained to me.  I agree that:  1. I will take part in other treatments as advised by my provider.  This may be psychiatry or counseling, physical therapy, behavioral therapy, group treatment, or a referral to a pain clinic.  I will reduce or stop my medicine when my provider tells me to do so.   2. I will take my medicines as prescribed.  I will not change the dose or schedule unless my provider tells me to.  There will be no refills if I  run out early.   I may be contacted at any time without warning and asked to complete a drug test or pill count.   3. I will keep all my appointments at the clinic.  If I miss appointments or fail to follow instructions, my provider may stop my medicine.  4. I will not ask other providers to prescribe controlled substances. And I will not accept controlled substances from other people. If I need another prescribed controlled substance for a new reason, I will notify my provider within one business day.  5. If I enroll in the [place] Medical Marijuana program, I will tell my provider.  I will also sign an agreement to share my medical records with my provider.  6. I will use one pharmacy to fill all of my controlled substance prescriptions.  If my prescription is mailed to my pharmacy, it may take 5  to 7 days for my medicine to be ready.  7. I understand that my provider, clinic care team, and pharmacy can track controlled substance prescriptions from other providers through a central database (prescription monitoring program).  8. I will bring in my list of medications (or my medicine bottles) each time I come to the clinic.  REV- 04/2016                                                                                                                                            Page 1 of 2      [facility]    05/26/17    Patient: Harrison Thomas  YOB: 1945  Medical Record Number: [id number]    9. Refills of controlled substances will be made only during office hours.  It is up to me to make sure that I do not run out of my medicines on weekends or holidays.    10. I am responsible for my prescriptions.  If the medicine is lost or stolen, it will not be replaced.   I also agree not to share these medicines with anyone.  11. I agree to not use ANY illegal or recreational drugs.  This includes marijuana, cocaine, bath salts or other drugs.  I agree not to use alcohol unless my provider says I may.  I agree to give urine samples whenever asked.  If I fail to give a urine sample, the provider may stop my medicine.     12. I will tell my nurse or provider right away if I become pregnant or have a new medical problem treated outside of [facility].  13. I understand that this medicine can affect my thinking and judgment.  It may be unsafe for me to drive, use machinery and do dangerous tasks.  I will not do any of these things until I know how the medicine affects me.  If my dose changes, I will wait to see how it affects me.  I will contact my provider if I have concerns about medicine side effects.  I understand that if I do not follow any of the conditions above, my prescriptions or treatment may be stopped.    I agree that my provider, clinic care team, and pharmacy may work with any  city, state or federal law enforcement agency that investigates the misuse, sale, or other diversion of my controlled medicine. I will allow my provider to discuss my care with or share a copy of this agreement with any other treating provider, pharmacy or emergency room where I receive care.  I agree to give up (waive) any right of privacy or confidentiality with respect to these authorizations.   I have read this agreement and have asked questions about anything I did not understand.   ___________________________________    ___________________________  Patient Signature                                                           Date and Time  ___________________________________     ____________________________  Witness                                                                            Date and Time  ___________________________________  Bhavsophie Dozier MD  REV-  04/2016                                                                                                                                                                 Page 2 of 2
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
## 2017-05-26 NOTE — NURSING NOTE
"Chief Complaint   Patient presents with     Panel Management     Please have patient sign new controlled substance agreement, last one is outdated (2013)     Hypertension     COPD       Initial /73 (BP Location: Left arm, Patient Position: Chair, Cuff Size: Adult Regular)  Pulse 59  Temp 97.6  F (36.4  C) (Tympanic)  Resp 18  Ht 5' 10\" (1.778 m)  Wt 181 lb (82.1 kg)  SpO2 94%  BMI 25.97 kg/m2 Estimated body mass index is 25.97 kg/(m^2) as calculated from the following:    Height as of this encounter: 5' 10\" (1.778 m).    Weight as of this encounter: 181 lb (82.1 kg).  Medication Reconciliation: complete   Bianca Hdz CMA (AAMA)      "

## 2017-05-26 NOTE — TELEPHONE ENCOUNTER
1.  Date/reason for appt:6/1/17, COPD  2.  Referring provider: ANISA DEVLIN  3.  Call to patient (Yes / No - short description):No, referred  4.  Previous care at / records requested from:   [facility]- office notes and imaging are in epic.

## 2017-05-29 ASSESSMENT — ENCOUNTER SYMPTOMS
NERVOUS/ANXIOUS: 0
WEAKNESS: 0
RESPIRATORY PAIN: 0
MUSCLE CRAMPS: 1
HEMATURIA: 0
SHORTNESS OF BREATH: 1
COUGH DISTURBING SLEEP: 0
NAIL CHANGES: 1
WHEEZING: 1
LOSS OF CONSCIOUSNESS: 0
PARALYSIS: 0
HEMOPTYSIS: 0
SWOLLEN GLANDS: 0
MUSCLE WEAKNESS: 0
DIFFICULTY URINATING: 1
SLEEP DISTURBANCES DUE TO BREATHING: 1
LEG SWELLING: 1
HYPOTENSION: 0
SYNCOPE: 0
JOINT SWELLING: 0
EXERCISE INTOLERANCE: 1
ORTHOPNEA: 1
DYSPNEA ON EXERTION: 1
BRUISES/BLEEDS EASILY: 1
DECREASED CONCENTRATION: 0
PANIC: 0
PALPITATIONS: 0
DEPRESSION: 0
TINGLING: 0
HYPERTENSION: 1
HEADACHES: 0
BACK PAIN: 1
LIGHT-HEADEDNESS: 1
LEG PAIN: 0
MYALGIAS: 1
NUMBNESS: 0
CLAUDICATION: 0
FLANK PAIN: 0
SEIZURES: 0
SPEECH CHANGE: 0
DIZZINESS: 1
COUGH: 1
TREMORS: 0
TACHYCARDIA: 1
DISTURBANCES IN COORDINATION: 0
SNORES LOUDLY: 1
POOR WOUND HEALING: 0
NECK PAIN: 1
MEMORY LOSS: 0
SPUTUM PRODUCTION: 1
DYSURIA: 0
ARTHRALGIAS: 0
INSOMNIA: 1
POSTURAL DYSPNEA: 1
STIFFNESS: 1

## 2017-06-01 ENCOUNTER — OFFICE VISIT (OUTPATIENT)
Dept: PULMONOLOGY | Facility: CLINIC | Age: 72
End: 2017-06-01
Attending: INTERNAL MEDICINE
Payer: COMMERCIAL

## 2017-06-01 VITALS
WEIGHT: 180.1 LBS | SYSTOLIC BLOOD PRESSURE: 163 MMHG | BODY MASS INDEX: 26.67 KG/M2 | OXYGEN SATURATION: 93 % | HEART RATE: 57 BPM | HEIGHT: 69 IN | DIASTOLIC BLOOD PRESSURE: 83 MMHG

## 2017-06-01 DIAGNOSIS — J44.1 OBSTRUCTIVE CHRONIC BRONCHITIS WITH EXACERBATION (H): ICD-10-CM

## 2017-06-01 DIAGNOSIS — J43.2 CENTRILOBULAR EMPHYSEMA (H): Primary | ICD-10-CM

## 2017-06-01 DIAGNOSIS — J44.9 COPD (CHRONIC OBSTRUCTIVE PULMONARY DISEASE) (H): Primary | ICD-10-CM

## 2017-06-01 PROCEDURE — 99211 OFF/OP EST MAY X REQ PHY/QHP: CPT | Mod: ZF

## 2017-06-01 ASSESSMENT — PAIN SCALES - GENERAL: PAINLEVEL: NO PAIN (0)

## 2017-06-01 NOTE — PROGRESS NOTES
Answers for HPI/ROS submitted by the patient on 5/29/2017   General Symptoms: No  Skin Symptoms: Yes  HENT Symptoms: No  EYE SYMPTOMS: No  HEART SYMPTOMS: Yes  LUNG SYMPTOMS: Yes  INTESTINAL SYMPTOMS: No  URINARY SYMPTOMS: Yes  REPRODUCTIVE SYMPTOMS: Yes  SKELETAL SYMPTOMS: Yes  BLOOD SYMPTOMS: Yes  NERVOUS SYSTEM SYMPTOMS: Yes  MENTAL HEALTH SYMPTOMS: Yes  Changes in hair: Yes  Itching: Yes  Rashes: No  Changes in nails: Yes  Acne: No  Change in facial hair: No  Warts: No  Non-healing sores: No  Scarring: Yes  Flaking of skin: No  Color changes of hands/feet in cold : No  Sun sensitivity: No  Skin thickening: No  Cough: Yes  Sputum or phlegm: Yes  Coughing up blood: No  Difficulty breating or shortness of breath: Yes  Snoring: Yes  Wheezing: Yes  Difficulty breathing on exertion: Yes  Respiratory pain: No  Nighttime Cough: No  Difficulty breathing when lying flat: Yes  Chest pain or pressure: No  Fast or irregular heartbeat: No  Pain in legs with walking: No  Swelling in feet or ankles: Yes  Trouble breathing while lying down: Yes  Fingers or Toes appear blue: No  High blood pressure: Yes  Low blood pressure: No  Fainting: No  Murmurs: No  Chest pain on exertion: No  Chest pain at rest: No  Cramping pain in leg during exercise: No  Pacemaker: No  Varicose veins: No  Fast heart beat: Yes  Wake up at night with shortness of breath: Yes  Heart flutters: No  Light-headedness: Yes  Exercise intolerance: Yes  Trouble holding urine or incontinence: Yes  Pain or burning: No  Trouble starting or stopping: No  Increased frequency of urination: Yes  Blood in urine: No  Decreased frequency of urination: No  Frequent nighttime urination: Yes  Flank pain: No  Difficulty emptying bladder: Yes  Back pain: Yes  Muscle aches: Yes  Neck pain: Yes  Swollen joints: No  Joint pain: No  Bone pain: Yes  Muscle cramps: Yes  Muscle weakness: No  Joint stiffness: Yes  Bone fracture: No  Anemia: No  Swollen glands: No  Easy bleeding or  bruising: Yes  Trouble with coordination: No  Dizziness or trouble with balance: Yes  Fainting or black-out spells: No  Memory loss: No  Headache: No  Seizures: No  Speech problems: No  Tingling: No  Tremor: No  Weakness: No  Difficulty walking: No  Paralysis: No  Numbness: No  Scrotal pain or swelling: No  Erectile dysfunction: Yes  Penile discharge: No  Genital ulcers: No  Reduced libido: Yes  Nervous or Anxious: No  Depression: No  Trouble sleeping: Yes  Trouble thinking or concentrating: No  Mood changes: Yes  Panic attacks: No    Answers for HPI/ROS submitted by the patient on 5/29/2017   General Symptoms: No  Skin Symptoms: Yes  HENT Symptoms: No  EYE SYMPTOMS: No  HEART SYMPTOMS: Yes  LUNG SYMPTOMS: Yes  INTESTINAL SYMPTOMS: No  URINARY SYMPTOMS: Yes  REPRODUCTIVE SYMPTOMS: Yes  SKELETAL SYMPTOMS: Yes  BLOOD SYMPTOMS: Yes  NERVOUS SYSTEM SYMPTOMS: Yes  MENTAL HEALTH SYMPTOMS: Yes  Changes in hair: Yes  Itching: Yes  Rashes: No  Changes in nails: Yes  Acne: No  Change in facial hair: No  Warts: No  Non-healing sores: No  Scarring: Yes  Flaking of skin: No  Color changes of hands/feet in cold : No  Sun sensitivity: No  Skin thickening: No  Cough: Yes  Sputum or phlegm: Yes  Coughing up blood: No  Difficulty breating or shortness of breath: Yes  Snoring: Yes  Wheezing: Yes  Difficulty breathing on exertion: Yes  Respiratory pain: No  Nighttime Cough: No  Difficulty breathing when lying flat: Yes  Chest pain or pressure: No  Fast or irregular heartbeat: No  Pain in legs with walking: No  Swelling in feet or ankles: Yes  Trouble breathing while lying down: Yes  Fingers or Toes appear blue: No  High blood pressure: Yes  Low blood pressure: No  Fainting: No  Murmurs: No  Chest pain on exertion: No  Chest pain at rest: No  Cramping pain in leg during exercise: No  Pacemaker: No  Varicose veins: No  Fast heart beat: Yes  Wake up at night with shortness of breath: Yes  Heart flutters: No  Light-headedness:  Yes  Exercise intolerance: Yes  Trouble holding urine or incontinence: Yes  Pain or burning: No  Trouble starting or stopping: No  Increased frequency of urination: Yes  Blood in urine: No  Decreased frequency of urination: No  Frequent nighttime urination: Yes  Flank pain: No  Difficulty emptying bladder: Yes  Back pain: Yes  Muscle aches: Yes  Neck pain: Yes  Swollen joints: No  Joint pain: No  Bone pain: Yes  Muscle cramps: Yes  Muscle weakness: No  Joint stiffness: Yes  Bone fracture: No  Anemia: No  Swollen glands: No  Easy bleeding or bruising: Yes  Trouble with coordination: No  Dizziness or trouble with balance: Yes  Fainting or black-out spells: No  Memory loss: No  Headache: No  Seizures: No  Speech problems: No  Tingling: No  Tremor: No  Weakness: No  Difficulty walking: No  Paralysis: No  Numbness: No  Scrotal pain or swelling: No  Erectile dysfunction: Yes  Penile discharge: No  Genital ulcers: No  Reduced libido: Yes  Nervous or Anxious: No  Depression: No  Trouble sleeping: Yes  Trouble thinking or concentrating: No  Mood changes: Yes  Panic attacks: No
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
## 2017-06-01 NOTE — MR AVS SNAPSHOT
After Visit Summary   6/1/2017    Harrison Thomas    MRN: [id number]           Patient Information     Date Of Birth          1945        Visit Information        Provider Department      6/1/2017 11:00 AM Khoa Handy MD [facility]        Today's Diagnoses     Centrilobular emphysema (H)    -  1    Obstructive chronic bronchitis with exacerbation (H)           Follow-ups after your visit        Follow-up notes from your care team     Return in about 6 months (around 12/1/2017).      Your next 10 appointments already scheduled     Nov 27, 2017  9:30 AM CST   Office Visit with Yaneli Dozier MD   [facility] ([facility])    [address] [id number] [phone]           Bring a current list of meds and any records pertaining to this visit.  For Physicals, please bring immunization records and any forms needing to be filled out.  Please arrive 10 minutes early to complete paperwork.            Dec 07, 2017 10:00 AM CST   SIX MINUTE WALK with UC PFL 6 MINUTE WALK 2, UC PFL A   [facility] Pulmonary Function Testing ([facility])    [address] [place] [id number] [phone]            Dec 07, 2017 11:00 AM CST   (Arrive by 10:45 AM)   Return Visit with Khoa Handy MD   [facility] ([facility])    [address] [place] [id number] [phone]              Future tests that were ordered for you today     Open Future Orders        Priority Expected Expires Ordered    6 minute walk test Routine  6/1/2018 6/1/2017    General PFT Lab (Please always keep checked) Routine  6/1/2018 6/1/2017    Pulmonary Function Test Routine  6/1/2018 6/1/2017            Who to contact     If you have questions or need follow up information about today's clinic visit or your schedule please contact [facility] directly at [phone].  Normal or non-critical lab and imaging results will be communicated to you by MyChart, letter or phone within 4 business days after the clinic has received the results. If you do not hear from us within 7 days, please contact the clinic through [employer] or phone. If you have a critical or abnormal lab result, we will notify you by phone as soon as possible.  Submit refill requests through [employer] or call your pharmacy and they will forward the refill request to us. Please allow 3 business days for your refill to be completed.          Additional Information About Your Visit        [employer] Information     [employer] gives you secure access to your electronic health record. If you see a primary care provider, you can also send messages to your care team and make appointments. If you have questions, please call your primary care clinic.  If you do not have a primary care provider, please call [phone] and they will assist you.        Care EveryWhere ID     This is your Care EveryWhere ID. This could be used by other organizations to access your [facility] medical records  [id number]        Your Vitals Were     Pulse Height Pulse Oximetry BMI (Body Mass Index)          57 1.753 m (5' 9\") 93% 26.6 kg/m2         Blood Pressure from Last 3 Encounters:   06/01/17 163/83   05/26/17 130/73   02/27/17 125/68    Weight from Last 3 Encounters:   06/01/17 81.7 kg (180 lb 1.6 oz)   05/26/17 82.1 kg (181 lb)   02/27/17 83.9 kg (185 lb)                 Today's Medication Changes          These changes are accurate as of: 6/1/17 12:24 PM.  If you have any questions, ask your nurse or doctor.               Start taking these medicines.        Dose/Directions    fluticasone-vilanterol 100-25 MCG/INH oral inhaler   Commonly known as:  BREO ELLIPTA   Used for:  Obstructive chronic bronchitis with exacerbation (H), Centrilobular emphysema (H)   Started by:  Khoa Handy " MD JOCELYNE        Dose:  1 puff   Inhale 1 puff into the lungs daily   Quantity:  1 Inhaler   Refills:  11            Where to get your medicines      These medications were sent to [facility]/pharmacy #[id number] - [place], [place] - [address] [place] [address]  [address] [place] [address]     Phone:  [phone]     fluticasone-vilanterol 100-25 MCG/INH oral inhaler                Primary Care Provider Office Phone # Fax #    Yaneli Dozier [phone]       [facility] [address]  [facility] [address]        Thank you!     Thank you for [facility]  for your care. Our goal is always to provide you with excellent care. Hearing back from our patients is one way we can continue to improve our services. Please take a few minutes to complete the written survey that you may receive in the mail after your visit with us. Thank you!             Your Updated Medication List - Protect others around you: Learn how to safely use, store and throw away your medicines at www.disposemymeds.org.          This list is accurate as of: 6/1/17 12:24 PM.  Always use your most recent med list.                   Brand Name Dispense Instructions for use    albuterol 108 (90 BASE) MCG/ACT Inhaler    albuterol    25.5 Inhaler    INHALE 2 PUFFS INTO THE LUNGS EVERY 6 HOURS AS NEEDED FOR SHORTNESS OF BREATH / DYSPNEA OR WHEEZING       alfuzosin 10 MG 24 hr tablet    UROXATRAL    90 tablet    Take 1 tablet (10 mg) by mouth daily After a meal       aspirin 81 MG tablet      Take 1 tablet by mouth 2 times daily       budesonide-formoterol 160-4.5 MCG/ACT Inhaler    SYMBICORT    3 Inhaler    Inhale 2 puffs into the lungs 2 times daily       carisoprodol 350 MG tablet    SOMA    30 tablet    TAKE 1 TABLET BY MOUTH 3 TIMES DAILY AND 1 TABLET AT BEDTIME       fish oil-omega-3 fatty acids 1000 MG capsule      take one tablet twice daily       fluticasone-vilanterol 100-25 MCG/INH  oral inhaler    BREO ELLIPTA    1 Inhaler    Inhale 1 puff into the lungs daily       HYDROcodone-acetaminophen 5-325 MG per tablet    NORCO    180 tablet    Take 1 tablet by mouth every 4 hours as needed       lisinopril 40 MG tablet    PRINIVIL/ZESTRIL    90 tablet    Take 1 tablet (40 mg) by mouth daily       methimazole 5 MG tablet    TAPAZOLE     Take 5 mg by mouth every other day       metoprolol 25 MG 24 hr tablet    TOPROL-XL    90 tablet    Take 1 tablet (25 mg) by mouth daily TAKE 1/2 TABLETS (12.5 MG) BY MOUTH DAILY       MULTIVITAMIN PO      Take by mouth daily       nitroglycerin 0.4 MG sublingual tablet    NITROSTAT    30 tablet    Place 1 tablet (0.4 mg) under the tongue See Admin Instructions for chest pain       rosuvastatin 10 MG tablet    CRESTOR    90 tablet    TAKE 1 TABLET (10 MG) BY MOUTH DAILY       triamcinolone 0.1 % lotion    KENALOG    60 mL    Apply sparingly to affected area three times daily as needed.       umeclidinium 62.5 MCG/INH oral inhaler    INCRUSE ELLIPTA    1 Inhaler    Inhale 1 puff into the lungs daily

## 2017-06-01 NOTE — NURSING NOTE
Chief Complaint   Patient presents with     Consult For     New Patient-  COPD    Yu Sanders at 10:53 AM on 6/1/2017
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
## 2017-06-08 ENCOUNTER — HOSPITAL ENCOUNTER (OUTPATIENT)
Dept: BONE DENSITY | Facility: CLINIC | Age: 72
Discharge: HOME | End: 2017-06-08
Attending: INTERNAL MEDICINE | Admitting: INTERNAL MEDICINE
Payer: COMMERCIAL

## 2017-06-08 DIAGNOSIS — M85.80 OSTEOPENIA: ICD-10-CM

## 2017-06-08 PROCEDURE — 77080 DXA BONE DENSITY AXIAL: CPT

## 2017-06-18 DIAGNOSIS — N40.1 BENIGN NON-NODULAR PROSTATIC HYPERPLASIA WITH LOWER URINARY TRACT SYMPTOMS: ICD-10-CM

## 2017-06-19 NOTE — TELEPHONE ENCOUNTER
alfuzosin (UROXATRAL) 10 MG 24 hr tablet           Last Written Prescription Date: 5/26/2017-PROFILE ONLY  Last Fill Quantity: 90, # refills: 3    Last Office Visit with [facility], [facility] or [facility] prescribing provider:  5/26/2017   Future Office Visit:      BP Readings from Last 3 Encounters:   06/01/17 163/83   05/26/17 130/73   02/27/17 125/68

## 2017-06-20 RX ORDER — ALFUZOSIN HYDROCHLORIDE 10 MG/1
TABLET, EXTENDED RELEASE ORAL
Qty: 90 TABLET | Refills: 3 | Status: SHIPPED | OUTPATIENT
Start: 2017-06-20 | End: 2018-02-26

## 2017-06-20 NOTE — TELEPHONE ENCOUNTER
Routing refill request to provider for review/approval because:  BP too high last check,    BP Readings from Last 1 Encounters:   06/01/17 163/83     Creatinine   Date Value Ref Range Status   05/26/2017 0.89 0.66 - 1.25 mg/dL Final   ]    Please advise regarding refill.  Lore Farah RN

## 2017-06-28 LAB
DLCOCOR-%PRED-PRE: 53 %
DLCOCOR-PRE: 13.79 ML/MIN/MMHG
DLCOUNC-%PRED-PRE: 53 %
DLCOUNC-PRE: 13.79 ML/MIN/MMHG
DLCOUNC-PRED: 25.79 ML/MIN/MMHG
ERV-%PRED-PRE: 37 %
ERV-PRE: 0.38 L
ERV-PRED: 1.03 L
EXPTIME-PRE: 10.16 SEC
FEF2575-%PRED-POST: 50 %
FEF2575-%PRED-PRE: 17 %
FEF2575-POST: 1.21 L/SEC
FEF2575-PRE: 0.41 L/SEC
FEF2575-PRED: 2.41 L/SEC
FEFMAX-%PRED-PRE: 34 %
FEFMAX-PRE: 2.84 L/SEC
FEFMAX-PRED: 8.17 L/SEC
FEV1-%PRED-PRE: 28 %
FEV1-PRE: 0.89 L
FEV1FEV6-PRE: 57 %
FEV1FEV6-PRED: 78 %
FEV1FVC-PRE: 56 %
FEV1FVC-PRED: 76 %
FEV1SVC-PRE: 43 %
FEV1SVC-PRED: 68 %
FIFMAX-PRE: 3.32 L/SEC
FRCPLETH-%PRED-PRE: 112 %
FRCPLETH-PRE: 4.09 L
FRCPLETH-PRED: 3.64 L
FVC-%PRED-PRE: 38 %
FVC-PRE: 1.58 L
FVC-PRED: 4.12 L
IC-%PRED-PRE: 48 %
IC-PRE: 1.71 L
IC-PRED: 3.56 L
RVPLETH-%PRED-PRE: 142 %
RVPLETH-PRE: 3.71 L
RVPLETH-PRED: 2.6 L
TLCPLETH-%PRED-PRE: 83 %
TLCPLETH-PRE: 5.8 L
TLCPLETH-PRED: 6.92 L
VA-%PRED-PRE: 52 %
VA-PRE: 3.47 L
VC-%PRED-PRE: 45 %
VC-PRE: 2.09 L
VC-PRED: 4.59 L

## 2017-07-24 ENCOUNTER — TELEPHONE (OUTPATIENT)
Dept: FAMILY MEDICINE | Facility: CLINIC | Age: 72
End: 2017-07-24

## 2017-07-24 ENCOUNTER — HOSPITAL ENCOUNTER (EMERGENCY)
Facility: CLINIC | Age: 72
Discharge: HOME | End: 2017-07-24
Attending: EMERGENCY MEDICINE | Admitting: EMERGENCY MEDICINE
Payer: COMMERCIAL

## 2017-07-24 ENCOUNTER — APPOINTMENT (OUTPATIENT)
Dept: CT IMAGING | Facility: CLINIC | Age: 72
End: 2017-07-24
Attending: EMERGENCY MEDICINE
Payer: COMMERCIAL

## 2017-07-24 VITALS
TEMPERATURE: 96.8 F | BODY MASS INDEX: 25.38 KG/M2 | RESPIRATION RATE: 18 BRPM | DIASTOLIC BLOOD PRESSURE: 64 MMHG | HEIGHT: 70 IN | OXYGEN SATURATION: 95 % | SYSTOLIC BLOOD PRESSURE: 126 MMHG | WEIGHT: 177.25 LBS

## 2017-07-24 DIAGNOSIS — M54.50 CHRONIC BILATERAL LOW BACK PAIN WITHOUT SCIATICA: Primary | ICD-10-CM

## 2017-07-24 DIAGNOSIS — M54.6 ACUTE RIGHT-SIDED THORACIC BACK PAIN: ICD-10-CM

## 2017-07-24 DIAGNOSIS — G89.29 CHRONIC BILATERAL LOW BACK PAIN WITHOUT SCIATICA: Primary | ICD-10-CM

## 2017-07-24 PROCEDURE — 25000128 H RX IP 250 OP 636: Performed by: EMERGENCY MEDICINE

## 2017-07-24 PROCEDURE — 96374 THER/PROPH/DIAG INJ IV PUSH: CPT

## 2017-07-24 PROCEDURE — 99285 EMERGENCY DEPT VISIT HI MDM: CPT | Mod: 25

## 2017-07-24 PROCEDURE — 96361 HYDRATE IV INFUSION ADD-ON: CPT

## 2017-07-24 PROCEDURE — 96375 TX/PRO/DX INJ NEW DRUG ADDON: CPT

## 2017-07-24 PROCEDURE — 25000132 ZZH RX MED GY IP 250 OP 250 PS 637: Performed by: EMERGENCY MEDICINE

## 2017-07-24 PROCEDURE — 74176 CT ABD & PELVIS W/O CONTRAST: CPT

## 2017-07-24 RX ORDER — SODIUM CHLORIDE 9 MG/ML
INJECTION, SOLUTION INTRAVENOUS CONTINUOUS
Status: DISCONTINUED | OUTPATIENT
Start: 2017-07-24 | End: 2017-07-24 | Stop reason: HOSPADM

## 2017-07-24 RX ORDER — OXYCODONE AND ACETAMINOPHEN 5; 325 MG/1; MG/1
1 TABLET ORAL EVERY 4 HOURS PRN
Qty: 15 TABLET | Refills: 0 | Status: SHIPPED | OUTPATIENT
Start: 2017-07-24 | End: 2017-07-25

## 2017-07-24 RX ORDER — LIDOCAINE 50 MG/G
1 PATCH TOPICAL EVERY 24 HOURS
Qty: 10 PATCH | Refills: 0 | Status: SHIPPED | OUTPATIENT
Start: 2017-07-24 | End: 2017-08-03

## 2017-07-24 RX ORDER — HYDROMORPHONE HYDROCHLORIDE 1 MG/ML
0.5 INJECTION, SOLUTION INTRAMUSCULAR; INTRAVENOUS; SUBCUTANEOUS
Status: COMPLETED | OUTPATIENT
Start: 2017-07-24 | End: 2017-07-24

## 2017-07-24 RX ORDER — LIDOCAINE 50 MG/G
1 PATCH TOPICAL
Status: COMPLETED | OUTPATIENT
Start: 2017-07-24 | End: 2017-07-24

## 2017-07-24 RX ORDER — DIAZEPAM 10 MG/2ML
2.5 INJECTION, SOLUTION INTRAMUSCULAR; INTRAVENOUS ONCE
Status: COMPLETED | OUTPATIENT
Start: 2017-07-24 | End: 2017-07-24

## 2017-07-24 RX ORDER — LIDOCAINE 50 MG/G
1 PATCH TOPICAL EVERY 24 HOURS
Qty: 30 PATCH | Refills: 1 | Status: SHIPPED | OUTPATIENT
Start: 2017-07-24 | End: 2018-08-27

## 2017-07-24 RX ADMIN — LIDOCAINE 1 PATCH: 50 PATCH TOPICAL at 09:22

## 2017-07-24 RX ADMIN — SODIUM CHLORIDE: 9 INJECTION, SOLUTION INTRAVENOUS at 07:28

## 2017-07-24 RX ADMIN — HYDROMORPHONE HYDROCHLORIDE 0.5 MG: 1 INJECTION, SOLUTION INTRAMUSCULAR; INTRAVENOUS; SUBCUTANEOUS at 07:27

## 2017-07-24 RX ADMIN — DIAZEPAM 2.5 MG: 5 INJECTION, SOLUTION INTRAMUSCULAR; INTRAVENOUS at 09:05

## 2017-07-24 ASSESSMENT — ENCOUNTER SYMPTOMS
ARTHRALGIAS: 1
BACK PAIN: 1
FEVER: 0

## 2017-07-24 NOTE — ED AVS SNAPSHOT
Emergency Department    [address] [place] [id number]    Phone:  [phone]    Fax:  [phone]                                       Harrison Thomas   MRN: [id number]    Department:   Emergency Department   Date of Visit:  7/24/2017           After Visit Summary Signature Page     I have received my discharge instructions, and my questions have been answered. I have discussed any challenges I see with this plan with the nurse or doctor.    ..........................................................................................................................................  Patient/Patient Representative Signature      ..........................................................................................................................................  Patient Representative Print Name and Relationship to Patient    ..................................................               ................................................  Date                                            Time    ..........................................................................................................................................  Reviewed by Signature/Title    ...................................................              ..............................................  Date                                                            Time

## 2017-07-24 NOTE — ED PROVIDER NOTES
History     Chief Complaint:  Back Pain      The history is provided by the patient.      Harrison Thomas is a 71 year old male with a history of back problems who presents with back pain. The patient woke up this morning with right sided back pain. He took a two Vicodin, one at 0200 and the other at 0500, this morning for the pain but was ineffective. He also has some associated hip pain. He notes that he has had Cortizone injections in his back in the past. He denies any fever, leg pain or symptoms prior to this morning. He has no other medical concerns.    Allergies:  Levaquin  Plavix [Clopidogrel Bisulfate]  Atorvastatin Calcium  Cats  Dogs  Hctz [Hydrochlorothiazide]  Spiriva [Tiotropium Bromide Monohydrate]      Medications:    Zanaflex  Percocet  Uroxatral  Breo Ellipta  Lisinopril  Norco  Kenalog  Nitrostat  Incurse Ellipta  Symbicort  Crestor  Toprol  Albuterol  Soma  Tapazole    Past Medical History:    Allergic rhinitis  Atherosclerosis  Back ache  Bruit  CAD  COPD  HTN  HLD  Immunodeficiency  Melanocytic nevi of lip  MI  Retina hole  Syncope  Thyroid nodule  TIA    Past Surgical History:    Liver part lobectomy  Colonoscopy  Heart Cath Angioplasty  RIght meniscus orthopedic surgery    Family History:    CAD  DM  COPD  C Cell Lymphoma  Non-Hodgkin's lymphoma    Social History:  Presents with spouse   Tobacco use: 1.50 PPD for 30 years. QD 1999  Alcohol use: Occasional  PCP: Yaneli Dozier    Marital Status:       Review of Systems   Constitutional: Negative for fever.   Musculoskeletal: Positive for arthralgias and back pain.   All other systems reviewed and are negative.      Physical Exam     Patient Vitals for the past 24 hrs:   BP Temp Temp src Heart Rate Resp SpO2 Height Weight   07/24/17 0915 - - - - - 95 % - -   07/24/17 0900 126/64 - - - - - - -   07/24/17 0802 - - - - - 93 % - -   07/24/17 0800 117/64 - - - - - - -   07/24/17 0620 135/77 96.8  F (36  C) Temporal 65 18 93 % 1.778 m (5'  "10\") 80.4 kg (177 lb 4 oz)      Physical Exam  Constitutional: The patient is oriented to person, place, and time. Appears uncomfortable.  HENT:   Head: Atraumatic  Right Ear: Normal  Left Ear: Normal  Nose: Nose normal.   Mouth/Throat: Oropharynx is clear and moist. No erythema or exudate.   Eyes: Conjunctivae and EOM are normal. Pupils are equal, round, and reactive to light. No discharge  Neck: Normal range of motion. Neck supple.   Cardiovascular: Normal rate, regular rhythm, no murmur gallops or rubs. Intact distal pulses.    Pulmonary/Chest: CTA bilaterally. No wheezes rale or rhonchi.  Abdominal: Soft. Non tender.  No masses   Musculoskeletal: No edema. No bony deformity. Normal range of motion. Some CVA tenderness. No spinal tenderness  Lymphadenopathy:     The patient has no cervical adenopathy.   Neurological: The patient is alert and oriented to person, place, and time. The patient has normal strength and normal reflexes. No cranial nerve deficit. Coordination normal.  Skin: Skin is warm and dry. No rash noted. The patient is not diaphoretic.   Psychiatric: The patient has a normal mood and affect.    Emergency Department Course   Imaging:  Radiographic findings were communicated with the patient who voiced understanding of the findings.  CT Abdomen Pelvis w/o Contrast  IMPRESSION:   1. No acute abnormality in the abdomen or pelvis. No ureteral calculi or evidence for urinary obstruction. No cause for right flank pain is identified.  2. Moderate prostatic enlargement.  3. Small left inguinal hernia contains a loop of non dilated sigmoid colon.    Imaging independently reviewed and agree with radiologist interpretation.    Interventions:  0727: Dilaudid 0.5mg IV injection   0905: Valium 2.5 mg IV  0928: Lidoderm 1 patch Transdermal    Emergency Department Course:  Past medical records, nursing notes, and vitals reviewed.  0700: I performed an exam of the patient and obtained history, as documented above. "      0840: I rechecked the patient. Findings and plan explained to the Patient. Patient discharged home with instructions regarding supportive care, medications, and reasons to return. The importance of close follow-up was reviewed.      Impression & Plan    Medical Decision Making:  Harrison Thomas is a 71 year old male presenting with right sided flank pain. Patient does note a history of chronic back pain although this feels a bit different. He denies any other associated symptoms. Suspect this pain is likely musculoskeletal in nature. Given indications did consider possibly renal colic or urinary tract infection. CT scan of pelvis was unremarkable without signs of hydronephrosis or kidney stone. He did have some incidental findings including a small left inguinal hernia but no signs of incarceration or obstruction. Patient was given Dilaudid, Lidoderm, and IV Valium. He is feeling better and I think he is safe for discharge. I prescribed him Lidoderm patched and Zanaflex. He should return to the ED for worsening symptoms.    Diagnosis:    ICD-10-CM   1. Acute right-sided thoracic back pain M54.6       Disposition:  Discharged to home with plan as outlined above.    Discharge Medications:  Discharge Medication List as of 7/24/2017  9:24 AM      START taking these medications    Details   lidocaine (LIDODERM) 5 % Patch Place 1 patch onto the skin every 24 hours for 10 daysDisp-10 patch, R-0Local Print      tiZANidine (ZANAFLEX) 4 MG tablet Take 1 tablet (4 mg) by mouth 3 times daily as needed for muscle spasms, Disp-20 tablet, R-0, Local Print      oxyCODONE-acetaminophen (PERCOCET) 5-325 MG per tablet Take 1 tablet by mouth every 4 hours as needed for pain, Disp-15 tablet, R-0, Local Print               Scott Bella  7/24/2017    EMERGENCY DEPARTMENT  I, Scott Bella, am serving as a scribe at 7:00 AM on 7/24/2017 to document services personally performed by Micah Ballard MD based on my observations  and the provider's statements to me.       Micah Ballard MD  07/24/17 2757

## 2017-07-24 NOTE — TELEPHONE ENCOUNTER
Patient seen in ER at [facility] today (07/24/17)  Was put on medication and advised to follow up with primary  In 2-3 days, Dr Dozier doesn't have  Anything available, wondring  If he can be worked in this week     Adeola (wife) [phone]

## 2017-07-24 NOTE — TELEPHONE ENCOUNTER
Patients wife still inquired about if a Prior Authorization is needed for [employer]  Please check on the lidocaine (LIDODERM) 5 % Patch if it needs a PA with [employer]

## 2017-07-24 NOTE — TELEPHONE ENCOUNTER
Pt. Wife called back and has preference for Lidocaine 5% patch generic, vs. Lidoderm. Major cost difference in insurance. She gave me a contact number for [employer]: [phone]. Please call pt if needed.

## 2017-07-24 NOTE — TELEPHONE ENCOUNTER
Spoke with patient's wife and states he woke up at 2 AM in the morning today with severe/excrutiating pain, went to ED at 6AM.  See ED encounter.    States Saturday he over lifted boxes of floor tiles possibly but no pain Sunday until Monday AM.  Wants to try the Lidoderm patches so she is going to call [employer] and find out their coverage on the patches and call me back if she wants to go ahead with them , also told her about the OTC patches that can be effective as well.    Patient is in a lot of pain and wants to know if he can be seen by Dr. Dozier and/or referral done or next step to address this back pain? Ortho?  The pain is on his lower right beltline, above his buttock towards the Flank/thoracic area.  His kidney's were ruled out. Had CT scan see results.      Please advise next step.  Lore Farah RN

## 2017-07-24 NOTE — ED AVS SNAPSHOT
Emergency Department    [address] [facility] [address]    Phone:  [phone]    Fax:  [phone]                                       Harrison Thomas   MRN: [id number]    Department:   Emergency Department   Date of Visit:  7/24/2017           Patient Information     Date Of Birth          1945        Your diagnoses for this visit were:     Acute right-sided thoracic back pain        You were seen by Micah Ballard MD.      Follow-up Information     Follow up with Yaneli Dozier MD.    Specialty:  Internal Medicine    Contact information:    [facility]  [address] [address]  [phone]          Discharge Instructions         Lidoderm patch was applied at 9:00 AM 7/24  Remove this patch at 9:00 PM on 7/24  You may apply another patch at 9:00 AM on 7/25    General Neck and Back Pain    Both neck and back pain are usually caused by injury to the muscles or ligaments of the spine. Sometimes the disks that separate each bone of the spine may cause pain by pressing on a nearby nerve. Back and neck pain may appear after a sudden twisting or bending force (such as in a car accident), or sometimes after a simple awkward movement. In either case, muscle spasm is often present and adds to the pain.  Acute neck and back pain usually gets better in 1 to 2 weeks. Pain related to disk disease, arthritis in the spinal joints or spinal stenosis (narrowing of the spinal canal) can become chronic and last for months or years.  Back and neck pain are common problems. Most people feel better in 1 or 2 weeks, and most of the rest in 1 to 2 months. Most people can remain active.  People experience and describe pain differently.    Pain can be sharp, stabbing, shooting, aching, cramping, or burning    Movement, standing, bending, lifting, sitting, or walking may worsen the pain    Pain can be localized to one spot or area, or it can be more generalized    Pain can  spread or radiate upwards, downwards, to the front, or go down your arms    Muscle spasm may occur.  Most of the time mechanical problems with the muscles or spine cause the pain. it is usually caused by an injury, whether known or not, to the muscles or ligaments. While illnesses can cause back pain, it is usually not caused by a serious illness. Pain is usually related to physical activity, whether sports, exercise, work, or normal activity. Sometimes it can occur without an identifiable cause. This can happen simply by stretching or moving wrong, without noting pain at the time. Other causes include:    Overexertion, lifting, pushing, pulling incorrectly or too aggressively.    Sudden twisting, bending or stretching from an accident (car or fall), or accidental movement.    Poor posture    Poor conditioning, lack of regular exercise    Spinal disc disease or arthritis    Stress    Pregnancy, or illness like appendicitis, bladder or kidney infection, pelvic infections   Home care    For neck pain: Use a comfortable pillow that supports the head and keeps the spine in a neutral position. The position of the head should not be tilted forward or backward.    When in bed, try to find a position of comfort. A firm mattress is best. Try lying flat on your back with pillows under your knees. You can also try lying on your side with your knees bent up towards your chest and a pillow between your knees.    At first, do not try to stretch out the sore spots. If there is a strain, it is not like the good soreness you get after exercising without an injury. In this case, stretching may make it worse.    Avoid prolonged sitting, long car rides or travel. This puts more stress on the lower back than standing or walking.    During the first 24 to 72 hours after an injury, apply an ice pack to the painful area for 20 minutes and then remove it for 20 minutes over a period of 60 to 90 minutes or several times a day.     You can  alternate ice and heat therapies. Talk with your healthcare provider about the best treatment for your back or neck pain. As a safety precaution, do not use a heating pad at bedtime. Sleeping with a heating pad can lead to skin burns or tissue damage.    Therapeutic massage can help relax the back and neck muscles without stretching them.    Be aware of safe lifting methods and do not lift anything over 15 pounds until all the pain is gone.  Medications  Talk to your healthcare provider before using medicine, especially if you have other medical problems or are taking other medicines.    You may use over-the-counter medicine to control pain, unless another pain medicine was prescribed. If you have chronic conditions like diabetes, liver or kidney disease, stomach ulcers,  gastrointestinal bleeding, or are taking blood thinner medicines.    Be careful if you are given pain medicines, narcotics, or medicine for muscle spasm. They can cause drowsiness, and can affect your coordination, reflexes, and judgment. Do not drive or operate heavy machinery.  Follow-up care  Follow up with your healthcare provider, or as advised. Physical therapy or further tests may be needed.  If X-rays were taken, you will be notified of any new findings that may affect your care.  Call 911  Seek emergency medical care if any of the following occur:    Trouble breathing    Confusion    Very drowsy or trouble awakening    Fainting or loss of consciousness    Rapid or very slow heart rate    Loss of bowel or bladder control  When to seek medical advice  Call your healthcare provider right away if any of these occur:    Pain becomes worse or spreads into your arms or legs    Weakness, numbness or pain in one or both arms or legs    Numbness in the groin area    Difficulty walking    Fever of 100.4 F (38 C) or higher, or as directed by your healthcare provider  Date Last Reviewed: 7/1/2016 2000-2017 [employer]. [address] [place] [address]  [place] [address]. All rights reserved. This information is not intended as a substitute for professional medical care. Always follow your healthcare professional's instructions.          Future Appointments        Provider Department Dept Phone Center    11/27/2017 9:30 AM Yaneli Dozier MD [facility] [phone]     12/7/2017 10:00 AM Pulmonary Function Lab; PFL 6 minute Walk [facility] Pulmonary Function Testing [phone] [facility]    12/7/2017 11:00 AM Khoa Handy MD [facility] [phone] [facility]      24 Hour Appointment Hotline       To make an appointment at any [facility], call [phone] ([phone]). If you don't have a family doctor or clinic, we will help you find one. [facility] are conveniently located to serve the needs of you and your family.             Review of your medicines      START taking        Dose / Directions Last dose taken    lidocaine 5 % Patch   Commonly known as:  LIDODERM   Dose:  1 patch   Quantity:  10 patch        Place 1 patch onto the skin every 24 hours for 10 days   Refills:  0        oxyCODONE-acetaminophen 5-325 MG per tablet   Commonly known as:  PERCOCET   Dose:  1 tablet   Quantity:  15 tablet        Take 1 tablet by mouth every 4 hours as needed for pain   Refills:  0        tiZANidine 4 MG tablet   Commonly known as:  ZANAFLEX   Dose:  4 mg   Quantity:  20 tablet        Take 1 tablet (4 mg) by mouth 3 times daily as needed for muscle spasms   Refills:  0          Our records show that you are taking the medicines listed below. If these are incorrect, please call your family doctor or clinic.        Dose / Directions Last dose taken    albuterol 108 (90 BASE) MCG/ACT Inhaler   Commonly known as:  albuterol   Quantity:  25.5 Inhaler        INHALE 2 PUFFS INTO THE LUNGS EVERY 6 HOURS AS NEEDED FOR SHORTNESS OF BREATH / DYSPNEA OR WHEEZING   Refills:  2        * alfuzosin 10 MG 24 hr tablet    Commonly known as:  UROXATRAL   Dose:  10 mg   Quantity:  90 tablet        Take 1 tablet (10 mg) by mouth daily After a meal   Refills:  3        * alfuzosin 10 MG 24 hr tablet   Commonly known as:  UROXATRAL   Quantity:  90 tablet        TAKE 1 TABLET (10 MG) BY MOUTH DAILY AFTER A MEAL   Refills:  3        aspirin 81 MG tablet   Dose:  1 tablet        Take 1 tablet by mouth 2 times daily   Refills:  0        budesonide-formoterol 160-4.5 MCG/ACT Inhaler   Commonly known as:  SYMBICORT   Dose:  2 puff   Quantity:  3 Inhaler        Inhale 2 puffs into the lungs 2 times daily   Refills:  3        carisoprodol 350 MG tablet   Commonly known as:  SOMA   Quantity:  30 tablet        TAKE 1 TABLET BY MOUTH 3 TIMES DAILY AND 1 TABLET AT BEDTIME   Refills:  0        fish oil-omega-3 fatty acids 1000 MG capsule        take one tablet twice daily   Refills:  0        fluticasone-vilanterol 100-25 MCG/INH oral inhaler   Commonly known as:  BREO ELLIPTA   Dose:  1 puff   Quantity:  1 Inhaler        Inhale 1 puff into the lungs daily   Refills:  11        HYDROcodone-acetaminophen 5-325 MG per tablet   Commonly known as:  NORCO   Dose:  1 tablet   Quantity:  180 tablet        Take 1 tablet by mouth every 4 hours as needed   Refills:  0        lisinopril 40 MG tablet   Commonly known as:  PRINIVIL/ZESTRIL   Dose:  40 mg   Quantity:  90 tablet        Take 1 tablet (40 mg) by mouth daily   Refills:  3        methimazole 5 MG tablet   Commonly known as:  TAPAZOLE   Dose:  5 mg        Take 5 mg by mouth every other day   Refills:  0        metoprolol 25 MG 24 hr tablet   Commonly known as:  TOPROL-XL   Dose:  25 mg   Quantity:  90 tablet        Take 1 tablet (25 mg) by mouth daily TAKE 1/2 TABLETS (12.5 MG) BY MOUTH DAILY   Refills:  3        MULTIVITAMIN PO        Take by mouth daily   Refills:  0        nitroGLYcerin 0.4 MG sublingual tablet   Commonly known as:  NITROSTAT   Dose:  0.4 mg   Quantity:  30 tablet        Place 1  tablet (0.4 mg) under the tongue See Admin Instructions for chest pain   Refills:  5        rosuvastatin 10 MG tablet   Commonly known as:  CRESTOR   Quantity:  90 tablet        TAKE 1 TABLET (10 MG) BY MOUTH DAILY   Refills:  2        triamcinolone 0.1 % lotion   Commonly known as:  KENALOG   Quantity:  60 mL        Apply sparingly to affected area three times daily as needed.   Refills:  0        umeclidinium 62.5 MCG/INH oral inhaler   Commonly known as:  INCRUSE ELLIPTA   Dose:  1 puff   Quantity:  1 Inhaler        Inhale 1 puff into the lungs daily   Refills:  3        * Notice:  This list has 2 medication(s) that are the same as other medications prescribed for you. Read the directions carefully, and ask your doctor or other care provider to review them with you.            Prescriptions were sent or printed at these locations (3 Prescriptions)                   Other Prescriptions                Printed at Department/Unit printer (3 of 3)         lidocaine (LIDODERM) 5 % Patch               tiZANidine (ZANAFLEX) 4 MG tablet               oxyCODONE-acetaminophen (PERCOCET) 5-325 MG per tablet                Procedures and tests performed during your visit     CT Abdomen Pelvis w/o Contrast    Pulse oximetry nursing    Strain urine      Orders Needing Specimen Collection     Ordered          07/24/17 0704  UA with Microscopic reflex to Culture - STAT, [facility]: STAT, Needs to be Collected     Scheduled Task Status   07/24/17 0705 Collect UA with Microscopic reflex to Culture Open   Order Class:  PCU Collect                  Pending Results     Date and Time Order Name Status Description    7/24/2017 0704 CT Abdomen Pelvis w/o Contrast Preliminary             Pending Culture Results     No orders found from 7/22/2017 to 7/25/2017.            Pending Results Instructions     If you had any lab results that were not finalized at the time of your Discharge, you can call the ED Lab Result RN at [phone]. You will be  contacted by this team for any positive Lab results or changes in treatment. The nurses are available 7 days a week from 10A to 6:30P.  You can leave a message 24 hours per day and they will return your call.        Test Results From Your Hospital Stay        7/24/2017  8:02 AM      Narrative     CT ABDOMEN AND PELVIS WITHOUT CONTRAST   7/24/2017 7:47 AM     HISTORY: Right flank pain.    TECHNIQUE: Volumetric helical sections were acquired from the lung  bases through the ischial tuberosities without IV contrast. Coronal  images were also reconstructed. Radiation dose for this scan was  reduced using automated exposure control, adjustment of the mA and/or  kV according to patient size, or iterative reconstruction technique.    COMPARISON: None.    FINDINGS:  There are two right renal cysts, with the largest  parapelvic cyst in the lower pole, measuring 3.6 cm. Thin  calcification in the right renal hilum is most likely vascular. No  urinary calculi or hydronephrosis. There is moderate prostatic  enlargement. No bladder stones are seen. Scattered colonic  diverticulosis, without convincing evidence for diverticulitis. A  small left inguinal hernia contains a loop of nondilated sigmoid  colon. The appendix is unremarkable. No free fluid in the pelvis.  Moderate atherosclerotic aortoiliac calcification. 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  Postoperative changes are noted in the right hepatic lobe. The liver,  gallbladder, spleen, adrenal glands, and pancreas have otherwise  unremarkable noncontrast appearances. Postoperative changes are noted  in the anterior abdominal wall just right of midline. An intramuscular  lipoma in the abdominal wall musculature laterally on the right  measures 6.4 x 2.9 cm. Coronary artery calcification. Mild scattered  scarring and/or fibrosis at both lung bases. Degenerative changes are  noted in the lumbar spine.        Impression      IMPRESSION:   1. No acute abnormality in the abdomen or pelvis. No ureteral calculi  or evidence for urinary obstruction. No cause for right flank pain is  identified.  2. Moderate prostatic enlargement.  3. Small left inguinal hernia contains a loop of nondilated sigmoid  colon.                Clinical Quality Measure: Blood Pressure Screening     Your blood pressure was checked while you were in the emergency department today. The last reading we obtained was  BP: 126/64 . Please read the guidelines below about what these numbers mean and what you should do about them.  If your systolic blood pressure (the top number) is less than 120 and your diastolic blood pressure (the bottom number) is less than 80, then your blood pressure is normal. There is nothing more that you need to do about it.  If your systolic blood pressure (the top number) is 120-139 or your diastolic blood pressure (the bottom number) is 80-89, your blood pressure may be higher than it should be. You should have your blood pressure rechecked within a year by a primary care provider.  If your systolic blood pressure (the top number) is 140 or greater or your diastolic blood pressure (the bottom number) is 90 or greater, you may have high blood pressure. High blood pressure is treatable, but if left untreated over time it can put you at risk for heart attack, stroke, or kidney failure. You should have your blood pressure rechecked by a primary care provider within the next 4 weeks.  If your provider in the emergency department today gave you specific instructions to follow-up with your doctor or provider even sooner than that, you should follow that instruction and not wait for up to 4 weeks for your follow-up visit.        Thank you for choosing [facility]       Thank you for choosing [facility] for your care. Our goal is always to provide you with excellent care. Hearing back from our patients is one way we can continue to improve our services.  Please take a few minutes to complete the written survey that you may receive in the mail after you visit with us. Thank you!        [email or web address] Information     [employer] gives you secure access to your electronic health record. If you see a primary care provider, you can also send messages to your care team and make appointments. If you have questions, please call your primary care clinic.  If you do not have a primary care provider, please call [phone] and they will assist you.        Care EveryWhere ID     This is your Care EveryWhere ID. This could be used by other organizations to access your [facility] medical records  [id number]        Equal Access to Services     [facility] : Mimi Marcus, marianna diallo, tiff sanchez, connie silva . So [facility] [phone].    ATENCIÓN: Si habla español, tiene a ann disposición servicios gratuitos de asistencia lingüística. Llame al [phone].    We comply with applicable federal civil rights laws and [place] laws. We do not discriminate on the basis of race, color, national origin, age, disability sex, sexual orientation or gender identity.            After Visit Summary       This is your record. Keep this with you and show to your community pharmacist(s) and doctor(s) at your next visit.

## 2017-07-24 NOTE — DISCHARGE INSTRUCTIONS
Lidoderm patch was applied at 9:00 AM 7/24  Remove this patch at 9:00 PM on 7/24  You may apply another patch at 9:00 AM on 7/25    General Neck and Back Pain    Both neck and back pain are usually caused by injury to the muscles or ligaments of the spine. Sometimes the disks that separate each bone of the spine may cause pain by pressing on a nearby nerve. Back and neck pain may appear after a sudden twisting or bending force (such as in a car accident), or sometimes after a simple awkward movement. In either case, muscle spasm is often present and adds to the pain.  Acute neck and back pain usually gets better in 1 to 2 weeks. Pain related to disk disease, arthritis in the spinal joints or spinal stenosis (narrowing of the spinal canal) can become chronic and last for months or years.  Back and neck pain are common problems. Most people feel better in 1 or 2 weeks, and most of the rest in 1 to 2 months. Most people can remain active.  People experience and describe pain differently.    Pain can be sharp, stabbing, shooting, aching, cramping, or burning    Movement, standing, bending, lifting, sitting, or walking may worsen the pain    Pain can be localized to one spot or area, or it can be more generalized    Pain can spread or radiate upwards, downwards, to the front, or go down your arms    Muscle spasm may occur.  Most of the time mechanical problems with the muscles or spine cause the pain. it is usually caused by an injury, whether known or not, to the muscles or ligaments. While illnesses can cause back pain, it is usually not caused by a serious illness. Pain is usually related to physical activity, whether sports, exercise, work, or normal activity. Sometimes it can occur without an identifiable cause. This can happen simply by stretching or moving wrong, without noting pain at the time. Other causes include:    Overexertion, lifting, pushing, pulling incorrectly or too aggressively.    Sudden twisting,  bending or stretching from an accident (car or fall), or accidental movement.    Poor posture    Poor conditioning, lack of regular exercise    Spinal disc disease or arthritis    Stress    Pregnancy, or illness like appendicitis, bladder or kidney infection, pelvic infections   Home care    For neck pain: Use a comfortable pillow that supports the head and keeps the spine in a neutral position. The position of the head should not be tilted forward or backward.    When in bed, try to find a position of comfort. A firm mattress is best. Try lying flat on your back with pillows under your knees. You can also try lying on your side with your knees bent up towards your chest and a pillow between your knees.    At first, do not try to stretch out the sore spots. If there is a strain, it is not like the good soreness you get after exercising without an injury. In this case, stretching may make it worse.    Avoid prolonged sitting, long car rides or travel. This puts more stress on the lower back than standing or walking.    During the first 24 to 72 hours after an injury, apply an ice pack to the painful area for 20 minutes and then remove it for 20 minutes over a period of 60 to 90 minutes or several times a day.     You can alternate ice and heat therapies. Talk with your healthcare provider about the best treatment for your back or neck pain. As a safety precaution, do not use a heating pad at bedtime. Sleeping with a heating pad can lead to skin burns or tissue damage.    Therapeutic massage can help relax the back and neck muscles without stretching them.    Be aware of safe lifting methods and do not lift anything over 15 pounds until all the pain is gone.  Medications  Talk to your healthcare provider before using medicine, especially if you have other medical problems or are taking other medicines.    You may use over-the-counter medicine to control pain, unless another pain medicine was prescribed. If you have  chronic conditions like diabetes, liver or kidney disease, stomach ulcers,  gastrointestinal bleeding, or are taking blood thinner medicines.    Be careful if you are given pain medicines, narcotics, or medicine for muscle spasm. They can cause drowsiness, and can affect your coordination, reflexes, and judgment. Do not drive or operate heavy machinery.  Follow-up care  Follow up with your healthcare provider, or as advised. Physical therapy or further tests may be needed.  If X-rays were taken, you will be notified of any new findings that may affect your care.  Call 911  Seek emergency medical care if any of the following occur:    Trouble breathing    Confusion    Very drowsy or trouble awakening    Fainting or loss of consciousness    Rapid or very slow heart rate    Loss of bowel or bladder control  When to seek medical advice  Call your healthcare provider right away if any of these occur:    Pain becomes worse or spreads into your arms or legs    Weakness, numbness or pain in one or both arms or legs    Numbness in the groin area    Difficulty walking    Fever of 100.4 F (38 C) or higher, or as directed by your healthcare provider  Date Last Reviewed: 7/1/2016 2000-2017 [employer]. [address] [place] [id number]. All rights reserved. This information is not intended as a substitute for professional medical care. Always follow your healthcare professional's instructions.

## 2017-07-24 NOTE — TELEPHONE ENCOUNTER
Brianne called patient and  schedule 7-25-17 at 4 PM with Dr. Dozier here at [facility] per Dr. Dozier's message in other Tel [employer] today.

## 2017-07-24 NOTE — TELEPHONE ENCOUNTER
Brianne called patient and  schedule 7-25-17 at 4 PM with Dr. Dozier here at [facility]

## 2017-07-24 NOTE — TELEPHONE ENCOUNTER
Reason for Call:  Other prescription    Detailed comments: wife calling with express script number for his prescriptions [phone]    Phone Number Patient can be reached at: Home number on file [phone] (home)    Best Time: anytime    Can we leave a detailed message on this number? YES    Call taken on 7/24/2017 at 12:30 PM by Yu Terry

## 2017-07-25 ENCOUNTER — OFFICE VISIT (OUTPATIENT)
Dept: FAMILY MEDICINE | Facility: CLINIC | Age: 72
End: 2017-07-25
Payer: COMMERCIAL

## 2017-07-25 VITALS — WEIGHT: 177 LBS | BODY MASS INDEX: 25.34 KG/M2 | HEIGHT: 70 IN

## 2017-07-25 DIAGNOSIS — M51.369 DEGENERATION OF LUMBAR INTERVERTEBRAL DISC: Primary | ICD-10-CM

## 2017-07-25 DIAGNOSIS — J44.9 CHRONIC OBSTRUCTIVE PULMONARY DISEASE, UNSPECIFIED (H): ICD-10-CM

## 2017-07-25 PROCEDURE — 99213 OFFICE O/P EST LOW 20 MIN: CPT | Performed by: INTERNAL MEDICINE

## 2017-07-25 RX ORDER — OXYCODONE AND ACETAMINOPHEN 5; 325 MG/1; MG/1
1 TABLET ORAL EVERY 4 HOURS PRN
Qty: 100 TABLET | Refills: 0 | Status: SHIPPED | OUTPATIENT
Start: 2017-07-25 | End: 2017-08-23

## 2017-07-25 RX ORDER — PREDNISONE 10 MG/1
TABLET ORAL
Qty: 20 TABLET | Refills: 0 | Status: SHIPPED | OUTPATIENT
Start: 2017-07-25 | End: 2017-07-25

## 2017-07-25 RX ORDER — PREDNISONE 10 MG/1
TABLET ORAL
Qty: 20 TABLET | Refills: 0 | Status: SHIPPED | OUTPATIENT
Start: 2017-07-25 | End: 2017-11-24

## 2017-07-25 NOTE — MR AVS SNAPSHOT
After Visit Summary   7/25/2017    Harrison Thomas    MRN: [id number]           Patient Information     Date Of Birth          1945        Visit Information        Provider Department      7/25/2017 4:00 PM Yaneli Dozier MD [facility]        Today's Diagnoses     Degeneration of lumbar intervertebral disc    -  1       Follow-ups after your visit        Additional Services     NEW PT, HAND, AND CHIROPRACTIC REFERRAL       **This order will print in the [facility] Scheduling Office**    Physical Therapy, Hand Therapy and Chiropractic Care are available through:    *[facility]  *[facility]  *[facility]    Call one number to schedule at any of the above locations: [phone].    Your provider has referred you to: Physical Therapy at [facility] or [facility]    Indication/Reason for Referral: Low Back Pain  Onset of Illness:   Therapy Orders: Evaluate and Treat  Special Programs: None  Special Request: None    Alia Rojo      Additional Comments for the Therapist or Chiropractor:     Please be aware that coverage of these services is subject to the terms and limitations of your health insurance plan.  Call member services at your health plan with any benefit or coverage questions.      Please bring the following to your appointment:    *Your personal calendar for scheduling future appointments  *Comfortable clothing                  Your next 10 appointments already scheduled     Nov 27, 2017  9:30 AM CST   Office Visit with Yaneli Dozier MD   [facility] ([facility])    [address] [id number] [phone]           Bring a current list of meds and any records pertaining to this visit.  For Physicals, please bring immunization records and any forms needing to be filled out.  Please arrive 10 minutes early to complete paperwork.            Dec 07, 2017 10:00 AM CST   SIX MINUTE WALK with UC PFL 6  MINUTE WALK 2,  PFL A   [facility] Pulmonary Function Testing ([facility] [place])    [address] [id number] [phone]            Dec 07, 2017 11:00 AM CST   (Arrive by 10:45 AM)   Return Visit with Khoa Handy MD   [facility] ([facility])    [address] [id number] [phone]              Future tests that were ordered for you today     Open Future Orders        Priority Expected Expires Ordered    MR Lumbar Spine w/o Contrast Routine 7/25/2017 7/25/2018 7/25/2017            Who to contact     If you have questions or need follow up information about today's clinic visit or your schedule please contact [facility] directly at [phone].  Normal or non-critical lab and imaging results will be communicated to you by MyChart, letter or phone within 4 business days after the clinic has received the results. If you do not hear from us within 7 days, please contact the clinic through [facility] or phone. If you have a critical or abnormal lab result, we will notify you by phone as soon as possible.  Submit refill requests through [employer] or call your pharmacy and they will forward the refill request to us. Please allow 3 business days for your refill to be completed.          Additional Information About Your Visit        MyChart Information     [employer] gives you secure access to your electronic health record. If you see a primary care provider, you can also send messages to your care team and make appointments. If you have questions, please call your primary care clinic.  If you do not have a primary care provider, please call [phone] and they will assist you.        Care EveryWhere ID     This is your Care EveryWhere ID. This could be used by other organizations to access your [place] medical records  [id number]        Your Vitals Were      "Height BMI (Body Mass Index)                5' 10\" (1.778 m) 25.4 kg/m2           Blood Pressure from Last 3 Encounters:   07/25/17 (P) 126/62   07/24/17 126/64   06/01/17 163/83    Weight from Last 3 Encounters:   07/25/17 177 lb (80.3 kg)   07/24/17 177 lb 4 oz (80.4 kg)   06/01/17 180 lb 1.6 oz (81.7 kg)              We Performed the Following     NEW PT, HAND, AND CHIROPRACTIC REFERRAL          Today's Medication Changes          These changes are accurate as of: 7/25/17  4:02 PM.  If you have any questions, ask your nurse or doctor.               Start taking these medicines.        Dose/Directions    predniSONE 10 MG tablet   Commonly known as:  DELTASONE   Used for:  Degeneration of lumbar intervertebral disc   Started by:  Yaneli Dozier MD        4 tabs daily for 2 days, then 3 tabs daily for 2 days, then 2 tabs daily for 2 days, then 1 tab daily for 2 days, then stop   Quantity:  20 tablet   Refills:  0         These medicines have changed or have updated prescriptions.        Dose/Directions    tiZANidine 4 MG tablet   Commonly known as:  ZANAFLEX   This may have changed:  when to take this   Used for:  Degeneration of lumbar intervertebral disc   Changed by:  Yaneli Dozier MD        Dose:  4 mg   Take 1 tablet (4 mg) by mouth nightly as needed for muscle spasms   Quantity:  20 tablet   Refills:  0            Where to get your medicines      These medications were sent to [facility]/pharmacy #[id number] [address] [address]     Phone:  [phone]     predniSONE 10 MG tablet    tiZANidine 4 MG tablet                Primary Care Provider Office Phone # Fax #    Yaneli Dozier [phone]       [facility] [place] [address]  [place] [address]        Equal Access to Services     SHYAM RIVERA AH: Mimi llamas Sojose, waaxda luqadaha, qaybta kaalmayuri martinezegdarrick, connie silva ah. So [facility] " [phone].    ATENCIÓN: Si lola chambers, tiene a ann disposición servicios gratuitos de asistencia lingüística. Roddy wolff [phone].    We comply with applicable federal civil rights laws and [place] laws. We do not discriminate on the basis of race, color, national origin, age, disability sex, sexual orientation or gender identity.            Thank you!     Thank you for choosing [facility]  for your care. Our goal is always to provide you with excellent care. Hearing back from our patients is one way we can continue to improve our services. Please take a few minutes to complete the written survey that you may receive in the mail after your visit with us. Thank you!             Your Updated Medication List - Protect others around you: Learn how to safely use, store and throw away your medicines at www.disposemymeds.org.          This list is accurate as of: 7/25/17  4:02 PM.  Always use your most recent med list.                   Brand Name Dispense Instructions for use Diagnosis    albuterol 108 (90 BASE) MCG/ACT Inhaler    albuterol    25.5 Inhaler    INHALE 2 PUFFS INTO THE LUNGS EVERY 6 HOURS AS NEEDED FOR SHORTNESS OF BREATH / DYSPNEA OR WHEEZING    COPD exacerbation (H)       * alfuzosin 10 MG 24 hr tablet    UROXATRAL    90 tablet    Take 1 tablet (10 mg) by mouth daily After a meal    Benign non-nodular prostatic hyperplasia with lower urinary tract symptoms       * alfuzosin 10 MG 24 hr tablet    UROXATRAL    90 tablet    TAKE 1 TABLET (10 MG) BY MOUTH DAILY AFTER A MEAL    Benign non-nodular prostatic hyperplasia with lower urinary tract symptoms       aspirin 81 MG tablet      Take 1 tablet by mouth 2 times daily        budesonide-formoterol 160-4.5 MCG/ACT Inhaler    SYMBICORT    3 Inhaler    Inhale 2 puffs into the lungs 2 times daily    COPD exacerbation (H)       carisoprodol 350 MG tablet    SOMA    30 tablet    TAKE 1 TABLET BY MOUTH 3 TIMES DAILY AND 1 TABLET AT BEDTIME     Lumbar arthropathy (H)       fish oil-omega-3 fatty acids 1000 MG capsule      take one tablet twice daily        fluticasone-vilanterol 100-25 MCG/INH oral inhaler    BREO ELLIPTA    1 Inhaler    Inhale 1 puff into the lungs daily    Obstructive chronic bronchitis with exacerbation (H), Centrilobular emphysema (H)       HYDROcodone-acetaminophen 5-325 MG per tablet    NORCO    180 tablet    Take 1 tablet by mouth every 4 hours as needed    Spinal stenosis of lumbar region with neurogenic claudication       * lidocaine 5 % Patch    LIDODERM    10 patch    Place 1 patch onto the skin every 24 hours for 10 days        * lidocaine 5 % Patch    LIDODERM    30 patch    Place 1 patch onto the skin every 24 hours    Chronic bilateral low back pain without sciatica       lisinopril 40 MG tablet    PRINIVIL/ZESTRIL    90 tablet    Take 1 tablet (40 mg) by mouth daily    Essential hypertension, benign       methimazole 5 MG tablet    TAPAZOLE     Take 5 mg by mouth every other day        metoprolol 25 MG 24 hr tablet    TOPROL-XL    90 tablet    Take 1 tablet (25 mg) by mouth daily TAKE 1/2 TABLETS (12.5 MG) BY MOUTH DAILY    Essential hypertension, benign       MULTIVITAMIN PO      Take by mouth daily        nitroGLYcerin 0.4 MG sublingual tablet    NITROSTAT    30 tablet    Place 1 tablet (0.4 mg) under the tongue See Admin Instructions for chest pain    Coronary artery disease involving native coronary artery of native heart without angina pectoris       oxyCODONE-acetaminophen 5-325 MG per tablet    PERCOCET    15 tablet    Take 1 tablet by mouth every 4 hours as needed for pain        predniSONE 10 MG tablet    DELTASONE    20 tablet    4 tabs daily for 2 days, then 3 tabs daily for 2 days, then 2 tabs daily for 2 days, then 1 tab daily for 2 days, then stop    Degeneration of lumbar intervertebral disc       rosuvastatin 10 MG tablet    CRESTOR    90 tablet    TAKE 1 TABLET (10 MG) BY MOUTH DAILY    Hyperlipidemia LDL  goal <100       tiZANidine 4 MG tablet    ZANAFLEX    20 tablet    Take 1 tablet (4 mg) by mouth nightly as needed for muscle spasms    Degeneration of lumbar intervertebral disc       triamcinolone 0.1 % lotion    KENALOG    60 mL    Apply sparingly to affected area three times daily as needed.    Medication reaction, initial encounter       umeclidinium 62.5 MCG/INH oral inhaler    INCRUSE ELLIPTA    1 Inhaler    Inhale 1 puff into the lungs daily    COPD exacerbation (H)       * Notice:  This list has 4 medication(s) that are the same as other medications prescribed for you. Read the directions carefully, and ask your doctor or other care provider to review them with you.

## 2017-07-25 NOTE — NURSING NOTE
"Chief Complaint   Patient presents with     ER F/U     thoracic pain       Initial BP (P) 126/62 (BP Location: Left arm, Patient Position: Chair, Cuff Size: Adult Regular)  Pulse (P) 66  Temp (P) 98.8  F (37.1  C) (Oral)  Ht 5' 10\" (1.778 m)  Wt 177 lb (80.3 kg)  SpO2 (P) 94%  BMI 25.4 kg/m2 Estimated body mass index is 25.4 kg/(m^2) as calculated from the following:    Height as of this encounter: 5' 10\" (1.778 m).    Weight as of this encounter: 177 lb (80.3 kg).  Medication Reconciliation: complete     Ace Herbert, WILLIAM   "

## 2017-07-31 ENCOUNTER — THERAPY VISIT (OUTPATIENT)
Dept: PHYSICAL THERAPY | Facility: CLINIC | Age: 72
End: 2017-07-31
Payer: COMMERCIAL

## 2017-07-31 DIAGNOSIS — M54.41 RIGHT-SIDED LOW BACK PAIN WITH RIGHT-SIDED SCIATICA: Primary | ICD-10-CM

## 2017-07-31 PROCEDURE — 97110 THERAPEUTIC EXERCISES: CPT | Mod: GP | Performed by: PHYSICAL THERAPIST

## 2017-07-31 PROCEDURE — 97161 PT EVAL LOW COMPLEX 20 MIN: CPT | Mod: GP | Performed by: PHYSICAL THERAPIST
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
## 2017-07-31 NOTE — MR AVS SNAPSHOT
After Visit Summary   7/31/2017    Harrison Thomas    MRN: [id number]           Patient Information     Date Of Birth          1945        Visit Information        Provider Department      7/31/2017 11:00 AM Schoenecker, Jon, PT [place] [facility] [place] [facility] Physical Therapy        Today's Diagnoses     Right-sided low back pain with right-sided sciatica    -  1       Follow-ups after your visit        Your next 10 appointments already scheduled     Aug 03, 2017  9:50 AM CDT   MR LUMBAR SPINE W/O CONTRAST with [id number]   [facility] ([facility])    [address] [place] [address] [phone]           Take your medicines as usual, unless your doctor tells you not to. Bring a list of your current medicines to your exam (including vitamins, minerals and over-the-counter drugs). Also bring the results of similar scans you may have had.  Please remove any body piercings and hair extensions before you arrive.  Follow your doctor s orders. If you do not, we may have to postpone your exam.  You will not have contrast for this exam. You do not need to do anything special to prepare.  The MRI machine uses a strong magnet. Please wear clothes without metal (snaps, zippers). A sweatsuit works well, or we may give you a hospital gown.   **IMPORTANT** THE INSTRUCTIONS BELOW ARE ONLY FOR THOSE PATIENTS WHO HAVE BEEN TOLD THEY WILL RECEIVE SEDATION OR GENERAL ANESTHESIA DURING THEIR MRI PROCEDURE:  IF YOU WILL RECEIVE SEDATION (take medicine to help you relax during your exam):   You must get the medicine from your doctor before you arrive. Bring the medicine to the exam. Do not take it at home.   Arrive one hour early. Bring someone who can take you home after the test. Your medicine will make you sleepy. After the exam, you may not drive, take a bus or take a taxi by yourself.   No eating 8 hours before your exam. You may have clear liquids up until 4  hours before your exam. (Clear liquids include water, clear tea, black coffee and fruit juice without pulp.)  IF YOU WILL RECEIVE ANESTHESIA (be asleep for your exam):   Arrive 1 1/2 hours early. Bring someone who can take you home after the test. You may not drive, take a bus or take a taxi by yourself.   No eating 8 hours before your exam. You may have clear liquids up until 4 hours before your exam. (Clear liquids include water, clear tea, black coffee and fruit juice without pulp.)   You will spend four to five hours in the recovery room.  Please call the Imaging Department at your exam site with any questions.            Aug 07, 2017  9:40 AM CDT   NEW Spine with Jon Schoenecker, PT   [facility] ([facility]  )    [address] [place], [place] [address] [address]   [phone]            Aug 14, 2017 10:20 AM CDT   NEW Spine with Jon Schoenecker, PT   [facility] ([facility]  )    [address] [place], [place] [address]  [facility] [address]   [phone]            Nov 27, 2017  9:30 AM CST   Office Visit with Yaneli Dozier MD   [facility]    [address] [place] [address] [phone]           Bring a current list of meds and any records pertaining to this visit. For Physicals, please bring immunization records and any forms needing to be filled out. Please arrive 10 minutes early to complete paperwork.            Dec 07, 2017 10:00 AM CST   SIX MINUTE WALK with UC PFL 6 MINUTE WALK 2, UC PFL A   [facility] Pulmonary Function Testing ([facility])    [address] [place] [address] [phone]            Dec 07, 2017 11:00 AM CST   (Arrive by 10:45 AM)   Return Visit with Khoa Handy MD   [facility] ([facility])    [address] [place] [address]  [address] [phone]              Who to contact     If you have questions or need follow up information about today's clinic visit or your schedule please contact [facility] directly at [phone].  Normal or non-critical lab and imaging results will be communicated to you by MyChart, letter or phone within 4 business days after the clinic has received the results. If you do not hear from us within 7 days, please contact the clinic through [employer] or phone. If you have a critical or abnormal lab result, we will notify you by phone as soon as possible.  Submit refill requests through [employer] or call your pharmacy and they will forward the refill request to us. Please allow 3 business days for your refill to be completed.          Additional Information About Your Visit        [employer] Information     [employer] gives you secure access to your electronic health record. If you see a primary care provider, you can also send messages to your care team and make appointments. If you have questions, please call your primary care clinic.  If you do not have a primary care provider, please call [phone] and they will assist you.        Care EveryWhere ID     This is your Care EveryWhere ID. This could be used by other organizations to access your [facility] medical records  [id number]         Blood Pressure from Last 3 Encounters:   07/25/17 (P) 126/62   07/24/17 126/64   06/01/17 163/83    Weight from Last 3 Encounters:   07/25/17 80.3 kg (177 lb)   07/24/17 80.4 kg (177 lb 4 oz)   06/01/17 81.7 kg (180 lb 1.6 oz)              We Performed the Following     HC PT EVAL, LOW COMPLEXITY     NEW INITIAL EVAL REPORT     THERAPEUTIC EXERCISES        Primary Care Provider Office Phone # Fax #    Yaneli Dozier [phone]       [facility] [address]  [place] [address]        Equal Access to Services     SHYAM RIVERA AH: Maiaii mann ku  muriel Marcus, ingridda luleeannadaha, qatoneta kavanda sanchez, connie stover juanraúl nicole lamarjoriecj pawan So [facility] [phone].    ATENCIÓN: Si ningla reyes, tiene a ann disposición servicios gratuitos de asistencia lingüística. Rodyd al [phone].    We comply with applicable federal civil rights laws and [place] laws. We do not discriminate on the basis of race, color, national origin, age, disability sex, sexual orientation or gender identity.            Thank you!     Thank you for choosing [place] [facility] PHYSICAL THERAPY  for your care. Our goal is always to provide you with excellent care. Hearing back from our patients is one way we can continue to improve our services. Please take a few minutes to complete the written survey that you may receive in the mail after your visit with us. Thank you!             Your Updated Medication List - Protect others around you: Learn how to safely use, store and throw away your medicines at www.disposemymeds.org.          This list is accurate as of: 7/31/17  2:32 PM.  Always use your most recent med list.                   Brand Name Dispense Instructions for use Diagnosis    albuterol 108 (90 BASE) MCG/ACT Inhaler    albuterol    25.5 Inhaler    INHALE 2 PUFFS INTO THE LUNGS EVERY 6 HOURS AS NEEDED FOR SHORTNESS OF BREATH / DYSPNEA OR WHEEZING    COPD exacerbation (H)       * alfuzosin 10 MG 24 hr tablet    UROXATRAL    90 tablet    Take 1 tablet (10 mg) by mouth daily After a meal    Benign non-nodular prostatic hyperplasia with lower urinary tract symptoms       * alfuzosin 10 MG 24 hr tablet    UROXATRAL    90 tablet    TAKE 1 TABLET (10 MG) BY MOUTH DAILY AFTER A MEAL    Benign non-nodular prostatic hyperplasia with lower urinary tract symptoms       aspirin 81 MG tablet      Take 1 tablet by mouth 2 times daily        budesonide-formoterol 160-4.5 MCG/ACT Inhaler    SYMBICORT    3 Inhaler    Inhale 2 puffs into the lungs 2 times daily     COPD exacerbation (H)       carisoprodol 350 MG tablet    SOMA    30 tablet    TAKE 1 TABLET BY MOUTH 3 TIMES DAILY AND 1 TABLET AT BEDTIME    Lumbar arthropathy (H)       fish oil-omega-3 fatty acids 1000 MG capsule      take one tablet twice daily        fluticasone-vilanterol 100-25 MCG/INH oral inhaler    BREO ELLIPTA    1 Inhaler    Inhale 1 puff into the lungs daily    Obstructive chronic bronchitis with exacerbation (H), Centrilobular emphysema (H)       HYDROcodone-acetaminophen 5-325 MG per tablet    NORCO    180 tablet    Take 1 tablet by mouth every 4 hours as needed    Spinal stenosis of lumbar region with neurogenic claudication       * lidocaine 5 % Patch    LIDODERM    10 patch    Place 1 patch onto the skin every 24 hours for 10 days        * lidocaine 5 % Patch    LIDODERM    30 patch    Place 1 patch onto the skin every 24 hours    Chronic bilateral low back pain without sciatica       lisinopril 40 MG tablet    PRINIVIL/ZESTRIL    90 tablet    Take 1 tablet (40 mg) by mouth daily    Essential hypertension, benign       methimazole 5 MG tablet    TAPAZOLE     Take 5 mg by mouth every other day        metoprolol 25 MG 24 hr tablet    TOPROL-XL    90 tablet    Take 1 tablet (25 mg) by mouth daily TAKE 1/2 TABLETS (12.5 MG) BY MOUTH DAILY    Essential hypertension, benign       MULTIVITAMIN PO      Take by mouth daily        nitroGLYcerin 0.4 MG sublingual tablet    NITROSTAT    30 tablet    Place 1 tablet (0.4 mg) under the tongue See Admin Instructions for chest pain    Coronary artery disease involving native coronary artery of native heart without angina pectoris       oxyCODONE-acetaminophen 5-325 MG per tablet    PERCOCET    100 tablet    Take 1 tablet by mouth every 4 hours as needed for pain    Degeneration of lumbar intervertebral disc       predniSONE 10 MG tablet    DELTASONE    20 tablet    4 tabs daily for 2 days, then 3 tabs daily for 2 days, then 2 tabs daily for 2 days, then 1 tab  daily for 2 days, then stop    Degeneration of lumbar intervertebral disc       rosuvastatin 10 MG tablet    CRESTOR    90 tablet    TAKE 1 TABLET (10 MG) BY MOUTH DAILY    Hyperlipidemia LDL goal <100       tiZANidine 4 MG tablet    ZANAFLEX    20 tablet    Take 1 tablet (4 mg) by mouth nightly as needed for muscle spasms    Degeneration of lumbar intervertebral disc       triamcinolone 0.1 % lotion    KENALOG    60 mL    Apply sparingly to affected area three times daily as needed.    Medication reaction, initial encounter       umeclidinium 62.5 MCG/INH oral inhaler    INCRUSE ELLIPTA    1 Inhaler    Inhale 1 puff into the lungs daily    COPD exacerbation (H)       * Notice:  This list has 4 medication(s) that are the same as other medications prescribed for you. Read the directions carefully, and ask your doctor or other care provider to review them with you.

## 2017-08-03 ENCOUNTER — HOSPITAL ENCOUNTER (OUTPATIENT)
Dept: MRI IMAGING | Facility: CLINIC | Age: 72
Discharge: HOME | End: 2017-08-03
Attending: INTERNAL MEDICINE | Admitting: INTERNAL MEDICINE
Payer: COMMERCIAL

## 2017-08-03 DIAGNOSIS — M51.369 DEGENERATION OF LUMBAR INTERVERTEBRAL DISC: ICD-10-CM

## 2017-08-03 PROCEDURE — 72148 MRI LUMBAR SPINE W/O DYE: CPT

## 2017-08-14 ENCOUNTER — THERAPY VISIT (OUTPATIENT)
Dept: PHYSICAL THERAPY | Facility: CLINIC | Age: 72
End: 2017-08-14
Payer: COMMERCIAL

## 2017-08-14 DIAGNOSIS — M54.41 RIGHT-SIDED LOW BACK PAIN WITH RIGHT-SIDED SCIATICA: ICD-10-CM

## 2017-08-14 PROCEDURE — 97140 MANUAL THERAPY 1/> REGIONS: CPT | Mod: GP | Performed by: PHYSICAL THERAPIST

## 2017-08-14 PROCEDURE — 97110 THERAPEUTIC EXERCISES: CPT | Mod: GP | Performed by: PHYSICAL THERAPIST

## 2017-08-23 DIAGNOSIS — M51.369 DEGENERATION OF LUMBAR INTERVERTEBRAL DISC: ICD-10-CM

## 2017-08-23 RX ORDER — OXYCODONE AND ACETAMINOPHEN 5; 325 MG/1; MG/1
1 TABLET ORAL EVERY 4 HOURS PRN
Qty: 100 TABLET | Refills: 0 | Status: SHIPPED | OUTPATIENT
Start: 2017-08-23 | End: 2017-10-09

## 2017-08-23 NOTE — TELEPHONE ENCOUNTER
Reason for Call:  Medication or medication refill:Oxycodone     Do you use a [facility]?No  Name of the pharmacy and phone number for the current request:  Will     Name of the medication requested: Oxycodone    Other request: please call patient when Ready for     Can we leave a detailed message on this number? YES    Phone number patient can be reached at: Home number on file [phone] (home)    Best Time: by Friday    Call taken on 8/23/2017 at 8:08 AM by Mary Nunes

## 2017-08-23 NOTE — TELEPHONE ENCOUNTER
Controlled Substance Refill Request for Pending Prescriptions:                       Disp   Refills    oxyCODONE-acetaminophen (PERCOCET) 5-325 *100 ta*0            Sig: Take 1 tablet by mouth every 4 hours as needed           for pain      Problem List Complete:  Yes    Last Written Prescription Date:  07/25/17  Last Fill Quantity: 100,   # refills: 0    Last Office Visit with [facility] primary care provider: 07/25/17    Clinic visit frequency required: Q 6  months     Future Office visit:     Controlled substance agreement on file: Yes:  Date 05/26/17.     Processing:  Patient will  in clinic     checked in past 6 months?  No, route to RN

## 2017-09-11 DIAGNOSIS — I10 ESSENTIAL HYPERTENSION, BENIGN: ICD-10-CM

## 2017-09-11 DIAGNOSIS — E78.5 HYPERLIPIDEMIA LDL GOAL <100: ICD-10-CM

## 2017-09-11 NOTE — TELEPHONE ENCOUNTER
lisinopril (PRINIVIL/ZESTRIL) 40 MG tablet  Last Written Prescription Date: 5/26/2017-PROFILE ONLY  Last Fill Quantity: 90, # refills: 3  Last Office Visit with [facility], [facility] or  Health prescribing provider: 7/25/2017  Next 5 appointments (look out 90 days)     Nov 27, 2017  9:30 AM CST   Office Visit with Yaneli Dozier MD   [facility]    [address] [facility] [address] [phone]                   Potassium   Date Value Ref Range Status   05/26/2017 4.4 3.4 - 5.3 mmol/L Final     Creatinine   Date Value Ref Range Status   05/26/2017 0.89 0.66 - 1.25 mg/dL Final     BP Readings from Last 3 Encounters:   07/25/17 (P) 126/62   07/24/17 126/64   06/01/17 163/83     rosuvastatin (CRESTOR) 10 MG tablet         Last Written Prescription Date: 11/18/2016  Last Fill Quantity: 90, # refills: 2  Last Office Visit with [facility], [facility] or  Health prescribing provider: 7/25/2017  Next 5 appointments (look out 90 days)     Nov 27, 2017  9:30 AM CST   Office Visit with Yaneli Dozier MD   [facility]    [address] [facility] [address] [phone]                   Lab Results   Component Value Date    CHOL 146 05/26/2017     Lab Results   Component Value Date    HDL 51 05/26/2017     Lab Results   Component Value Date    LDL 78 05/26/2017     Lab Results   Component Value Date    TRIG 85 05/26/2017     Lab Results   Component Value Date    CHOLHDLRATIO 2.7 12/19/2014

## 2017-09-12 RX ORDER — ROSUVASTATIN CALCIUM 10 MG/1
TABLET, COATED ORAL
Qty: 90 TABLET | Refills: 1 | Status: SHIPPED | OUTPATIENT
Start: 2017-09-12 | End: 2018-01-03

## 2017-09-12 RX ORDER — LISINOPRIL 40 MG/1
TABLET ORAL
Qty: 90 TABLET | Refills: 1 | Status: SHIPPED | OUTPATIENT
Start: 2017-09-12 | End: 2018-02-26

## 2017-09-12 NOTE — TELEPHONE ENCOUNTER
Sent remaining Rx to new pharmacy - lisinopril  Prescription approved per [facility] Refill Protocol. Atorvastatin

## 2017-09-26 ENCOUNTER — ALLIED HEALTH/NURSE VISIT (OUTPATIENT)
Dept: NURSING | Facility: CLINIC | Age: 72
End: 2017-09-26
Payer: COMMERCIAL

## 2017-09-26 DIAGNOSIS — Z23 NEED FOR PROPHYLACTIC VACCINATION AND INOCULATION AGAINST INFLUENZA: Primary | ICD-10-CM

## 2017-09-26 PROCEDURE — 90471 IMMUNIZATION ADMIN: CPT

## 2017-09-26 PROCEDURE — 90662 IIV NO PRSV INCREASED AG IM: CPT

## 2017-09-26 PROCEDURE — 99207 ZZC NO CHARGE NURSE ONLY: CPT

## 2017-09-26 NOTE — MR AVS SNAPSHOT
After Visit Summary   9/26/2017    Harrison Thomas    MRN: [id number]           Patient Information     Date Of Birth          1945        Visit Information        Provider Department      9/26/2017 10:15 AM [place] [facility]        Today's Diagnoses     Need for prophylactic vaccination and inoculation against influenza    -  1       Follow-ups after your visit        Your next 10 appointments already scheduled     Nov 27, 2017  9:30 AM CST   [facility]   [facility]    [address] [facility] [id number] [phone]           Bring a current list of meds and any records pertaining to this visit. For Physicals, please bring immunization records and any forms needing to be filled out. Please arrive 10 minutes early to complete paperwork.            Dec 07, 2017 10:00 AM CST   SIX MINUTE WALK with UC PFL 6 MINUTE WALK 2, UC PFL A   [facility] Pulmonary Function Testing ([facility])    [address] [place] [address] [phone]            Dec 07, 2017 11:00 AM CST   (Arrive by 10:45 AM)   Return Visit with Khoa Handy MD   [facility] for [facility] and Health ([facility])    [address] [place] [address] [phone]              Who to contact     If you have questions or need follow up information about today's clinic visit or your schedule please contact [facility] directly at [phone].  Normal or non-critical lab and imaging results will be communicated to you by MyChart, letter or phone within 4 business days after the clinic has received the results. If you do not hear from us within 7 days, please contact the clinic through MyChart or phone. If you have a critical or abnormal lab result, we will notify you by phone as soon as possible.  Submit refill requests through  [employer] or call your pharmacy and they will forward the refill request to us. Please allow 3 business days for your refill to be completed.          Additional Information About Your Visit        MyChart Information     [employer] gives you secure access to your electronic health record. If you see a primary care provider, you can also send messages to your care team and make appointments. If you have questions, please call your primary care clinic.  If you do not have a primary care provider, please call [phone] and they will assist you.        Care EveryWhere ID     This is your Care EveryWhere ID. This could be used by other organizations to access your [facility] medical records  [id number]         Blood Pressure from Last 3 Encounters:   07/25/17 (P) 126/62   07/24/17 126/64   06/01/17 163/83    Weight from Last 3 Encounters:   07/25/17 177 lb (80.3 kg)   07/24/17 177 lb 4 oz (80.4 kg)   06/01/17 180 lb 1.6 oz (81.7 kg)              We Performed the Following     FLU VACCINE, INCREASED ANTIGEN, PRESV FREE, AGE 65+ [74556]     Vaccine Administration, Initial [05479]        Primary Care Provider Office Phone # Fax #    Bhavsophie Dozier [phone] [address] [address]        Equal Access to Services     [facility] NICOLE : Hadii aad ku hadasho Soomaali, waaxda luqadaha, qaybta kaalmada adeegyada, connie silva . So [facility] [phone].    ATENCIÓN: Si habla español, tiene a nan disposición servicios gratuitos de asistencia lingüística. Llame al [phone].    We comply with applicable federal civil rights laws and [place] laws. We do not discriminate on the basis of race, color, national origin, age, disability sex, sexual orientation or gender identity.            Thank you!     Thank you for choosing [facility]  for your care. Our goal is always to provide you with excellent care. Hearing back from our patients is one way we can  continue to improve our services. Please take a few minutes to complete the written survey that you may receive in the mail after your visit with us. Thank you!             Your Updated Medication List - Protect others around you: Learn how to safely use, store and throw away your medicines at www.disposemymeds.org.          This list is accurate as of: 9/26/17 12:24 PM.  Always use your most recent med list.                   Brand Name Dispense Instructions for use Diagnosis    albuterol 108 (90 BASE) MCG/ACT Inhaler    PROAIR HFA    25.5 Inhaler    INHALE 2 PUFFS INTO THE LUNGS EVERY 6 HOURS AS NEEDED FOR SHORTNESS OF BREATH / DYSPNEA OR WHEEZING    COPD exacerbation (H)       * alfuzosin 10 MG 24 hr tablet    UROXATRAL    90 tablet    Take 1 tablet (10 mg) by mouth daily After a meal    Benign non-nodular prostatic hyperplasia with lower urinary tract symptoms       * alfuzosin 10 MG 24 hr tablet    UROXATRAL    90 tablet    TAKE 1 TABLET (10 MG) BY MOUTH DAILY AFTER A MEAL    Benign non-nodular prostatic hyperplasia with lower urinary tract symptoms       aspirin 81 MG tablet      Take 1 tablet by mouth 2 times daily        budesonide-formoterol 160-4.5 MCG/ACT Inhaler    SYMBICORT    3 Inhaler    Inhale 2 puffs into the lungs 2 times daily    COPD exacerbation (H)       carisoprodol 350 MG tablet    SOMA    30 tablet    TAKE 1 TABLET BY MOUTH 3 TIMES DAILY AND 1 TABLET AT BEDTIME    Lumbar arthropathy (H)       fish oil-omega-3 fatty acids 1000 MG capsule      take one tablet twice daily        fluticasone-vilanterol 100-25 MCG/INH oral inhaler    BREO ELLIPTA    1 Inhaler    Inhale 1 puff into the lungs daily    Obstructive chronic bronchitis with exacerbation (H), Centrilobular emphysema (H)       HYDROcodone-acetaminophen 5-325 MG per tablet    NORCO    180 tablet    Take 1 tablet by mouth every 4 hours as needed    Spinal stenosis of lumbar region with neurogenic claudication       lidocaine 5 % Patch     LIDODERM    30 patch    Place 1 patch onto the skin every 24 hours    Chronic bilateral low back pain without sciatica       lisinopril 40 MG tablet    PRINIVIL/ZESTRIL    90 tablet    TAKE 1 TABLET (40 MG) BY MOUTH DAILY    Essential hypertension, benign       methimazole 5 MG tablet    TAPAZOLE     Take 5 mg by mouth every other day        metoprolol 25 MG 24 hr tablet    TOPROL-XL    90 tablet    Take 1 tablet (25 mg) by mouth daily TAKE 1/2 TABLETS (12.5 MG) BY MOUTH DAILY    Essential hypertension, benign       MULTIVITAMIN PO      Take by mouth daily        nitroGLYcerin 0.4 MG sublingual tablet    NITROSTAT    30 tablet    Place 1 tablet (0.4 mg) under the tongue See Admin Instructions for chest pain    Coronary artery disease involving native coronary artery of native heart without angina pectoris       oxyCODONE-acetaminophen 5-325 MG per tablet    PERCOCET    100 tablet    Take 1 tablet by mouth every 4 hours as needed for pain    Degeneration of lumbar intervertebral disc       predniSONE 10 MG tablet    DELTASONE    20 tablet    4 tabs daily for 2 days, then 3 tabs daily for 2 days, then 2 tabs daily for 2 days, then 1 tab daily for 2 days, then stop    Degeneration of lumbar intervertebral disc       rosuvastatin 10 MG tablet    CRESTOR    90 tablet    TAKE 1 TABLET (10 MG) BY MOUTH DAILY    Hyperlipidemia LDL goal <100       tiZANidine 4 MG tablet    ZANAFLEX    20 tablet    Take 1 tablet (4 mg) by mouth nightly as needed for muscle spasms    Degeneration of lumbar intervertebral disc       triamcinolone 0.1 % lotion    KENALOG    60 mL    Apply sparingly to affected area three times daily as needed.    Medication reaction, initial encounter       umeclidinium 62.5 MCG/INH oral inhaler    INCRUSE ELLIPTA    1 Inhaler    Inhale 1 puff into the lungs daily    COPD exacerbation (H)       * Notice:  This list has 2 medication(s) that are the same as other medications prescribed for you. Read the directions  carefully, and ask your doctor or other care provider to review them with you.

## 2017-09-26 NOTE — PROGRESS NOTES
Injectable Influenza Immunization Documentation    1.  Is the person to be vaccinated sick today?   No    2. Does the person to be vaccinated have an allergy to a component   of the vaccine?   No    3. Has the person to be vaccinated ever had a serious reaction   to influenza vaccine in the past?   No    4. Has the person to be vaccinated ever had Guillain-Barré syndrome?   No    Form completed by anthony pruitt

## 2017-10-09 DIAGNOSIS — M51.369 DEGENERATION OF LUMBAR INTERVERTEBRAL DISC: ICD-10-CM

## 2017-10-09 DIAGNOSIS — G89.4 CHRONIC PAIN SYNDROME: ICD-10-CM

## 2017-10-09 NOTE — TELEPHONE ENCOUNTER
Controlled Substance Refill Request for oxyCODONE-acetaminophen (PERCOCET) 5-325 MG per tablet  Problem List Complete:  Yes    Last Written Prescription Date:  8/23/2017  Last Fill Quantity: 100,   # refills: 0    Last Office Visit with [facility] primary care provider: 7/25/2017    Clinic visit frequency required: Q 6  months     Future Office visit:   Next 5 appointments (look out 90 days)     Nov 27, 2017  9:30 AM CST   Office Visit with Yaneli Dozier MD   [facility]    [address] [facility] [address]   [phone]                  Controlled substance agreement on file: Yes:  Date 6/6/2017.     Processing:  Patient will  in clinic     checked in past 6 months?  No, route to RN

## 2017-10-09 NOTE — TELEPHONE ENCOUNTER
Reason for Call:  call back and prescription    Detailed comments: Pt calling in requesting to refill oxyCODONE-acetaminophen (PERCOCET) 5-325 MG per tablet. He states that he is leaving town Thursday. Please call when ready.     Phone Number Patient can be reached at: Home number on file [phone] (home)    Best Time: any    Can we leave a detailed message on this number? YES    Call taken on 10/9/2017 at 8:52 AM by Christie Tamayo

## 2017-10-10 RX ORDER — OXYCODONE AND ACETAMINOPHEN 5; 325 MG/1; MG/1
1 TABLET ORAL EVERY 4 HOURS PRN
Qty: 100 TABLET | Refills: 0 | Status: SHIPPED | OUTPATIENT
Start: 2017-10-10 | End: 2017-11-24

## 2017-10-10 NOTE — TELEPHONE ENCOUNTER
checked today   Did have 15 tabs Percocet prescribed at ER on 7/24/17. Otherwise, Tasia is prescriber for all controlled subs.  Nora Rojo RN

## 2017-11-05 DIAGNOSIS — I10 ESSENTIAL HYPERTENSION, BENIGN: ICD-10-CM

## 2017-11-06 DIAGNOSIS — J44.1 COPD EXACERBATION (H): ICD-10-CM

## 2017-11-06 NOTE — TELEPHONE ENCOUNTER
levalbuterol (XOPENEX) 0.31 MG/3ML neb solution      Last Written Prescription Date:  ?  Last Fill Quantity: ?,   # refills: ?  LOV: 7/25/2017  Future Office visit:    Next 5 appointments (look out 90 days)     Nov 24, 2017  9:00 AM CST   [facility]    [address] [facility] [address]   [phone]                   Routing refill request to provider for review/approval because:  Drug not active on patient's medication list

## 2017-11-07 RX ORDER — METOPROLOL SUCCINATE 25 MG/1
TABLET, EXTENDED RELEASE ORAL
Qty: 90 TABLET | Refills: 1 | Status: SHIPPED | OUTPATIENT
Start: 2017-11-07 | End: 2018-02-26

## 2017-11-08 RX ORDER — LEVALBUTEROL INHALATION SOLUTION 0.31 MG/3ML
SOLUTION RESPIRATORY (INHALATION)
Qty: 720 ML | Refills: 1 | Status: SHIPPED | OUTPATIENT
Start: 2017-11-08 | End: 2019-10-28

## 2017-11-08 NOTE — TELEPHONE ENCOUNTER
Med listed as discontinued from 1/6/17 OV  LVM asking pt to call back to verify if still taking/needing this medication    Zaria HAYS RN

## 2017-11-08 NOTE — TELEPHONE ENCOUNTER
Patient states he doesn't use very often but does still use on occasion.  Currently having increased wheezing had has used what he has at home.  Neb helping at this time and declines an appt.   Also denies SOB.  Prescription approved per [facility].  Radha Chavira, RN

## 2017-11-20 DIAGNOSIS — M62.830 BACK MUSCLE SPASM: Primary | ICD-10-CM

## 2017-11-22 RX ORDER — CARISOPRODOL 250 MG/1
TABLET ORAL
Qty: 120 TABLET | Refills: 0 | Status: SHIPPED | OUTPATIENT
Start: 2017-11-22 | End: 2017-11-24

## 2017-11-22 NOTE — TELEPHONE ENCOUNTER
CARISOPRODOL 250 MG PO TABS      Last Written Prescription Date:  4/29/16  Last Fill Quantity: 30,   # refills: 0  Future Office visit:    Next 5 appointments (look out 90 days)     Nov 24, 2017  9:00 AM CST   [facility]    [address] [facility] [address]   [phone]                   Routing refill request to provider for review/approval because:  Drug not on the [facility], [facility] or [place] refill protocol or controlled substance

## 2017-11-24 ENCOUNTER — OFFICE VISIT (OUTPATIENT)
Dept: FAMILY MEDICINE | Facility: CLINIC | Age: 72
End: 2017-11-24
Payer: COMMERCIAL

## 2017-11-24 VITALS
SYSTOLIC BLOOD PRESSURE: 138 MMHG | BODY MASS INDEX: 26.51 KG/M2 | OXYGEN SATURATION: 92 % | TEMPERATURE: 98 F | DIASTOLIC BLOOD PRESSURE: 76 MMHG | WEIGHT: 185.2 LBS | HEIGHT: 70 IN | HEART RATE: 60 BPM

## 2017-11-24 DIAGNOSIS — G89.4 CHRONIC PAIN SYNDROME: ICD-10-CM

## 2017-11-24 DIAGNOSIS — M51.369 DEGENERATION OF LUMBAR INTERVERTEBRAL DISC: ICD-10-CM

## 2017-11-24 DIAGNOSIS — G47.30 SLEEP DISORDER BREATHING: ICD-10-CM

## 2017-11-24 DIAGNOSIS — J44.9 CHRONIC OBSTRUCTIVE PULMONARY DISEASE, UNSPECIFIED COPD TYPE (H): Primary | ICD-10-CM

## 2017-11-24 DIAGNOSIS — D47.2 MONOCLONAL PARAPROTEINEMIA: ICD-10-CM

## 2017-11-24 DIAGNOSIS — M48.062 SPINAL STENOSIS OF LUMBAR REGION WITH NEUROGENIC CLAUDICATION: ICD-10-CM

## 2017-11-24 DIAGNOSIS — I25.10 ATHEROSCLEROSIS OF NATIVE CORONARY ARTERY OF NATIVE HEART WITHOUT ANGINA PECTORIS: ICD-10-CM

## 2017-11-24 DIAGNOSIS — E05.90 THYROTOXICOSIS WITHOUT THYROID STORM, UNSPECIFIED THYROTOXICOSIS TYPE: ICD-10-CM

## 2017-11-24 LAB
ANION GAP SERPL CALCULATED.3IONS-SCNC: 6 MMOL/L (ref 3–14)
BUN SERPL-MCNC: 12 MG/DL (ref 7–30)
CALCIUM SERPL-MCNC: 9 MG/DL (ref 8.5–10.1)
CHLORIDE SERPL-SCNC: 108 MMOL/L (ref 94–109)
CO2 SERPL-SCNC: 28 MMOL/L (ref 20–32)
CREAT SERPL-MCNC: 0.87 MG/DL (ref 0.66–1.25)
ERYTHROCYTE [DISTWIDTH] IN BLOOD BY AUTOMATED COUNT: 13.9 % (ref 10–15)
GFR SERPL CREATININE-BSD FRML MDRD: 86 ML/MIN/1.7M2
GLUCOSE SERPL-MCNC: 90 MG/DL (ref 70–99)
HCT VFR BLD AUTO: 42.3 % (ref 40–53)
HGB BLD-MCNC: 14.3 G/DL (ref 13.3–17.7)
MCH RBC QN AUTO: 30.6 PG (ref 26.5–33)
MCHC RBC AUTO-ENTMCNC: 33.8 G/DL (ref 31.5–36.5)
MCV RBC AUTO: 90 FL (ref 78–100)
PLATELET # BLD AUTO: 167 10E9/L (ref 150–450)
POTASSIUM SERPL-SCNC: 4.3 MMOL/L (ref 3.4–5.3)
RBC # BLD AUTO: 4.68 10E12/L (ref 4.4–5.9)
SODIUM SERPL-SCNC: 142 MMOL/L (ref 133–144)
T4 FREE SERPL-MCNC: 1.36 NG/DL (ref 0.76–1.46)
TSH SERPL DL<=0.005 MIU/L-ACNC: 0.38 MU/L (ref 0.4–4)
WBC # BLD AUTO: 6.8 10E9/L (ref 4–11)

## 2017-11-24 PROCEDURE — 80048 BASIC METABOLIC PNL TOTAL CA: CPT | Performed by: INTERNAL MEDICINE

## 2017-11-24 PROCEDURE — 36415 COLL VENOUS BLD VENIPUNCTURE: CPT | Performed by: INTERNAL MEDICINE

## 2017-11-24 PROCEDURE — 99215 OFFICE O/P EST HI 40 MIN: CPT | Performed by: INTERNAL MEDICINE

## 2017-11-24 PROCEDURE — 84439 ASSAY OF FREE THYROXINE: CPT | Performed by: INTERNAL MEDICINE

## 2017-11-24 PROCEDURE — 84165 PROTEIN E-PHORESIS SERUM: CPT | Performed by: INTERNAL MEDICINE

## 2017-11-24 PROCEDURE — 85027 COMPLETE CBC AUTOMATED: CPT | Performed by: INTERNAL MEDICINE

## 2017-11-24 PROCEDURE — 00000402 ZZHCL STATISTIC TOTAL PROTEIN: Performed by: INTERNAL MEDICINE

## 2017-11-24 PROCEDURE — 84443 ASSAY THYROID STIM HORMONE: CPT | Performed by: INTERNAL MEDICINE

## 2017-11-24 RX ORDER — AZITHROMYCIN 250 MG/1
TABLET, FILM COATED ORAL
Qty: 6 TABLET | Refills: 0 | Status: SHIPPED | OUTPATIENT
Start: 2017-11-24 | End: 2017-12-07

## 2017-11-24 RX ORDER — ROFLUMILAST 500 UG/1
500 TABLET ORAL DAILY
Qty: 31 TABLET | Refills: 3 | Status: SHIPPED | OUTPATIENT
Start: 2017-11-24 | End: 2021-07-01

## 2017-11-24 RX ORDER — HYDROCODONE BITARTRATE AND ACETAMINOPHEN 5; 325 MG/1; MG/1
1 TABLET ORAL 2 TIMES DAILY PRN
Qty: 180 TABLET | Refills: 0 | Status: SHIPPED | OUTPATIENT
Start: 2017-11-24 | End: 2017-11-24

## 2017-11-24 RX ORDER — HYDROCODONE BITARTRATE AND ACETAMINOPHEN 5; 325 MG/1; MG/1
1 TABLET ORAL 4 TIMES DAILY PRN
Qty: 180 TABLET | Refills: 0 | Status: SHIPPED | OUTPATIENT
Start: 2017-11-24 | End: 2017-12-07

## 2017-11-24 RX ORDER — TIOTROPIUM BROMIDE 18 UG/1
CAPSULE ORAL; RESPIRATORY (INHALATION)
Qty: 30 CAPSULE | Refills: 1 | COMMUNITY
Start: 2017-11-24 | End: 2017-12-07

## 2017-11-24 RX ORDER — HYDROCODONE BITARTRATE AND ACETAMINOPHEN 5; 325 MG/1; MG/1
1 TABLET ORAL EVERY 4 HOURS PRN
Qty: 180 TABLET | Refills: 0 | Status: SHIPPED | OUTPATIENT
Start: 2017-11-24 | End: 2017-11-24

## 2017-11-24 RX ORDER — HYDROCODONE BITARTRATE AND ACETAMINOPHEN 5; 325 MG/1; MG/1
1 TABLET ORAL EVERY 4 HOURS PRN
Qty: 180 TABLET | Refills: 0 | Status: SHIPPED | OUTPATIENT
Start: 2017-11-24 | End: 2018-02-26

## 2017-11-24 RX ORDER — PREDNISONE 10 MG/1
TABLET ORAL
Qty: 20 TABLET | Refills: 0 | Status: SHIPPED | OUTPATIENT
Start: 2017-11-24 | End: 2017-12-07

## 2017-11-24 NOTE — MR AVS SNAPSHOT
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  Call member services at your health plan with any benefit or coverage questions.      Please bring the following to your appointment:    >>   List of current medications   >>   This referral request   >>   Any documents/labs given to you for this referral                      Follow-up notes from your care team     Return in about 3 months (around 2/24/2018).      Your next 10 appointments already scheduled     Dec 07, 2017 10:00 AM CST   SIX MINUTE WALK with UC PFL 6 MINUTE WALK 2, UC PFL A   [facility] Pulmonary Function Testing ([facility] [place])    [address] [place] [address]   [phone]            Dec 07, 2017 11:00 AM CST   (Arrive by 10:45 AM)   Return Visit with Khoa Handy MD   [facility] ([facility] [place])    [address] [place] [address]  "Floor  [facility] [address] [phone]              Future tests that were ordered for you today     Open Future Orders        Priority Expected Expires Ordered    SLEEP EVALUATION & MANAGEMENT REFERRAL - ADULT -[facility]  [employer] [phone]  (Age 18 and up) Routine  11/24/2018 11/24/2017            Who to contact     If you have questions or need follow up information about today's clinic visit or your schedule please contact [facility] directly at [phone].  Normal or non-critical lab and imaging results will be communicated to you by [employer], letter or phone within 4 business days after the clinic has received the results. If you do not hear from us within 7 days, please contact the clinic through [employer] or phone. If you have a critical or abnormal lab result, we will notify you by phone as soon as possible.  Submit refill requests through [employer] or call your pharmacy and they will forward the refill request to us. Please allow 3 business days for your refill to be completed.          Additional Information About Your Visit        [employer] Information     [employer] gives you secure access to your electronic health record. If you see a primary care provider, you can also send messages to your care team and make appointments. If you have questions, please call your primary care clinic.  If you do not have a primary care provider, please call [phone] and they will assist you.        Care EveryWhere ID     This is your Care EveryWhere ID. This could be used by other organizations to access your [facility] medical records  [id number]        Your Vitals Were     Pulse Temperature Height Pulse Oximetry BMI (Body Mass Index)       60 98  F (36.7  C) (Oral) 5' 10\" (1.778 m) 92% 26.57 kg/m2        Blood Pressure from Last 3 Encounters:   11/24/17 150/76   07/25/17 (P) 126/62   07/24/17 126/64    Weight from Last 3 Encounters:   11/24/17 185 lb 3.2 oz (84 kg)   07/25/17 177 lb " (80.3 kg)   07/24/17 177 lb 4 oz (80.4 kg)              We Performed the Following     Basic metabolic panel     CBC with platelets     Protein electrophoresis     TSH with free T4 reflex          Today's Medication Changes          These changes are accurate as of: 11/24/17  9:29 AM.  If you have any questions, ask your nurse or doctor.               Start taking these medicines.        Dose/Directions    azithromycin 250 MG tablet   Commonly known as:  ZITHROMAX   Used for:  Chronic obstructive pulmonary disease, unspecified COPD type (H)        Two tablets first day, then one tablet daily for four days.   Quantity:  6 tablet   Refills:  0       roflumilast 500 MCG Tabs tablet   Commonly known as:  DALIRESP   Used for:  Chronic obstructive pulmonary disease, unspecified COPD type (H)        Dose:  500 mcg   Take 1 tablet (500 mcg) by mouth daily   Quantity:  31 tablet   Refills:  3         These medicines have changed or have updated prescriptions.        Dose/Directions    carisoprodol 350 MG tablet   Commonly known as:  SOMA   This may have changed:  Another medication with the same name was removed. Continue taking this medication, and follow the directions you see here.   Used for:  Lumbar arthropathy (H)        TAKE 1 TABLET BY MOUTH 3 TIMES DAILY AND 1 TABLET AT BEDTIME   Quantity:  30 tablet   Refills:  0         Stop taking these medicines if you haven't already. Please contact your care team if you have questions.     fluticasone-vilanterol 100-25 MCG/INH oral inhaler   Commonly known as:  BREO ELLIPTA           oxyCODONE-acetaminophen 5-325 MG per tablet   Commonly known as:  PERCOCET           tiZANidine 4 MG tablet   Commonly known as:  ZANAFLEX                Where to get your medicines      These medications were sent to [facility]/pharmacy #[id number] - [place] [id number] [place] [address]     Phone:  [phone]     roflumilast 500 MCG Tabs tablet         These  medications were sent to [facility] [place] - [address]  [address], [place] [address]     Phone:  [phone]     azithromycin 250 MG tablet    predniSONE 10 MG tablet                Primary Care Provider Office Phone # Fax #    Clarksophie Dozier [phone] [address]  [place] [address]        Equal Access to Services     FUNMI RIVERA : Hadii aad ku hadasho Soomaali, waaxda luqadaha, qaybta kaalmada adeegyada, waxay idiin hayaan adeeg kharaprachi ladustin . So [facility] [phone].    ATENCIÓN: Si habla español, tiene a ann disposición servicios gratuitos de asistencia lingüística. [facility] [phone].    We comply with applicable federal civil rights laws and [place] laws. We do not discriminate on the basis of race, color, national origin, age, disability, sex, sexual orientation, or gender identity.            Thank you!     Thank you for choosing [facility]  for your care. Our goal is always to provide you with excellent care. Hearing back from our patients is one way we can continue to improve our services. Please take a few minutes to complete the written survey that you may receive in the mail after your visit with us. Thank you!             Your Updated Medication List - Protect others around you: Learn how to safely use, store and throw away your medicines at www.disposemymeds.org.          This list is accurate as of: 11/24/17  9:29 AM.  Always use your most recent med list.                   Brand Name Dispense Instructions for use Diagnosis    albuterol 108 (90 BASE) MCG/ACT Inhaler    PROAIR HFA    25.5 Inhaler    INHALE 2 PUFFS INTO THE LUNGS EVERY 6 HOURS AS NEEDED FOR SHORTNESS OF BREATH / DYSPNEA OR WHEEZING    COPD exacerbation (H)       * alfuzosin 10 MG 24 hr tablet    UROXATRAL    90 tablet    Take 1 tablet (10 mg) by mouth daily After a meal    Benign non-nodular prostatic hyperplasia with  lower urinary tract symptoms       * alfuzosin 10 MG 24 hr tablet    UROXATRAL    90 tablet    TAKE 1 TABLET (10 MG) BY MOUTH DAILY AFTER A MEAL    Benign non-nodular prostatic hyperplasia with lower urinary tract symptoms       aspirin 81 MG tablet      Take 1 tablet by mouth 2 times daily        azithromycin 250 MG tablet    ZITHROMAX    6 tablet    Two tablets first day, then one tablet daily for four days.    Chronic obstructive pulmonary disease, unspecified COPD type (H)       budesonide-formoterol 160-4.5 MCG/ACT Inhaler    SYMBICORT    3 Inhaler    Inhale 2 puffs into the lungs 2 times daily    COPD exacerbation (H)       carisoprodol 350 MG tablet    SOMA    30 tablet    TAKE 1 TABLET BY MOUTH 3 TIMES DAILY AND 1 TABLET AT BEDTIME    Lumbar arthropathy (H)       fish oil-omega-3 fatty acids 1000 MG capsule      take one tablet twice daily        HYDROcodone-acetaminophen 5-325 MG per tablet    NORCO    180 tablet    Take 1 tablet by mouth every 4 hours as needed    Spinal stenosis of lumbar region with neurogenic claudication       levalbuterol 0.31 MG/3ML neb solution    XOPENEX    720 mL    INHALE 1 VIAL (3ML) BY NEBULIZATION EVERY 4 HOURS AS NEEDED FOR WHEEZING OR SHORTNESS OF BREATH.    COPD exacerbation (H)       lidocaine 5 % Patch    LIDODERM    30 patch    Place 1 patch onto the skin every 24 hours    Chronic bilateral low back pain without sciatica       lisinopril 40 MG tablet    PRINIVIL/ZESTRIL    90 tablet    TAKE 1 TABLET (40 MG) BY MOUTH DAILY    Essential hypertension, benign       methimazole 5 MG tablet    TAPAZOLE     Take 5 mg by mouth every other day        metoprolol 25 MG 24 hr tablet    TOPROL-XL    90 tablet    TAKE 1 TABLET BY MOUTH DAILY    Essential hypertension, benign       MULTIVITAMIN PO      Take by mouth daily        nitroGLYcerin 0.4 MG sublingual tablet    NITROSTAT    30 tablet    Place 1 tablet (0.4 mg) under the tongue See Admin Instructions for chest pain    Coronary  artery disease involving native coronary artery of native heart without angina pectoris       predniSONE 10 MG tablet    DELTASONE    20 tablet    4 tabs daily for 2 days, then 3 tabs daily for 2 days, then 2 tabs daily for 2 days, then 1 tab daily for 2 days, then stop    Degeneration of lumbar intervertebral disc       roflumilast 500 MCG Tabs tablet    DALIRESP    31 tablet    Take 1 tablet (500 mcg) by mouth daily    Chronic obstructive pulmonary disease, unspecified COPD type (H)       rosuvastatin 10 MG tablet    CRESTOR    90 tablet    TAKE 1 TABLET (10 MG) BY MOUTH DAILY    Hyperlipidemia LDL goal <100       SPIRIVA HANDIHALER 18 MCG capsule   Generic drug:  tiotropium     30 capsule    Inhale contents of one capsule daily.        triamcinolone 0.1 % lotion    KENALOG    60 mL    Apply sparingly to affected area three times daily as needed.    Medication reaction, initial encounter       umeclidinium 62.5 MCG/INH oral inhaler    INCRUSE ELLIPTA    1 Inhaler    Inhale 1 puff into the lungs daily    COPD exacerbation (H)       * Notice:  This list has 2 medication(s) that are the same as other medications prescribed for you. Read the directions carefully, and ask your doctor or other care provider to review them with you.
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
## 2017-11-27 LAB
ALBUMIN SERPL ELPH-MCNC: 3.6 G/DL (ref 3.7–5.1)
ALPHA1 GLOB SERPL ELPH-MCNC: 0.4 G/DL (ref 0.2–0.4)
ALPHA2 GLOB SERPL ELPH-MCNC: 0.8 G/DL (ref 0.5–0.9)
B-GLOBULIN SERPL ELPH-MCNC: 0.9 G/DL (ref 0.6–1)
GAMMA GLOB SERPL ELPH-MCNC: 1 G/DL (ref 0.7–1.6)
M PROTEIN SERPL ELPH-MCNC: 0.1 G/DL
PROT PATTERN SERPL ELPH-IMP: ABNORMAL

## 2017-12-07 ENCOUNTER — OFFICE VISIT (OUTPATIENT)
Dept: PULMONOLOGY | Facility: CLINIC | Age: 72
End: 2017-12-07
Attending: INTERNAL MEDICINE
Payer: COMMERCIAL

## 2017-12-07 VITALS
HEART RATE: 80 BPM | DIASTOLIC BLOOD PRESSURE: 74 MMHG | RESPIRATION RATE: 16 BRPM | OXYGEN SATURATION: 96 % | WEIGHT: 185.2 LBS | BODY MASS INDEX: 26.57 KG/M2 | SYSTOLIC BLOOD PRESSURE: 152 MMHG

## 2017-12-07 DIAGNOSIS — J43.8 OTHER EMPHYSEMA (H): ICD-10-CM

## 2017-12-07 DIAGNOSIS — J42 CHRONIC BRONCHITIS, UNSPECIFIED CHRONIC BRONCHITIS TYPE (H): Primary | ICD-10-CM

## 2017-12-07 DIAGNOSIS — J44.1 OBSTRUCTIVE CHRONIC BRONCHITIS WITH EXACERBATION (H): ICD-10-CM

## 2017-12-07 DIAGNOSIS — J43.2 CENTRILOBULAR EMPHYSEMA (H): ICD-10-CM

## 2017-12-07 LAB
6 MIN WALK (FT): 1650 FT
6 MIN WALK (M): 503 M

## 2017-12-07 PROCEDURE — 99212 OFFICE O/P EST SF 10 MIN: CPT | Mod: ZF

## 2017-12-07 ASSESSMENT — PAIN SCALES - GENERAL: PAINLEVEL: NO PAIN (0)

## 2017-12-07 NOTE — MR AVS SNAPSHOT
After Visit Summary   12/7/2017    Harrison Thomas    MRN: [id number]           Patient Information     Date Of Birth          1945        Visit Information        Provider Department      12/7/2017 11:00 AM Khoa Handy MD [facility]        Today's Diagnoses     Chronic bronchitis, unspecified chronic bronchitis type (H)    -  1    Other emphysema (H)           Follow-ups after your visit        Follow-up notes from your care team     Return in about 6 months (around 6/7/2018).      Your next 10 appointments already scheduled     Dec 13, 2017 10:30 AM CST   Ech Complete with [id number]   [facility] CV Echocardiography (Cardiovascular Imaging at [facility])    [address]  [address]  [facility] [id number] [phone]           1. Please bring or wear a comfortable two-piece outfit. 2. You may eat, drink and take your normal medicines. 3. For any questions that cannot be answered, please contact the ordering physician            Dec 21, 2017 10:00 AM CST   New Sleep Patient with Jerry Mccabe MD   [facility] ([facility])    [address]  [address]  [facility] [id number] [phone]            Feb 26, 2018  9:30 AM CST   Office Visit with Yaneli Dozier MD   [facility] ([facility]    [address] [id number] [phone]           Bring a current list of meds and any records pertaining to this visit. For Physicals, please bring immunization records and any forms needing to be filled out. Please arrive 10 minutes early to complete paperwork.            Jun 14, 2018 10:30 AM CDT   (Arrive by 10:15 AM)   Return Visit with MD MELE Richardson [facility] ([facility])    [address]  [address] [facility] [address] [phone]              Who to contact      If you have questions or need follow up information about today's clinic visit or your schedule please contact [facility] directly at [phone].  Normal or non-critical lab and imaging results will be communicated to you by Trulioohart, letter or phone within 4 business days after the clinic has received the results. If you do not hear from us within 7 days, please contact the clinic through Trulioohart or phone. If you have a critical or abnormal lab result, we will notify you by phone as soon as possible.  Submit refill requests through Wikisway or call your pharmacy and they will forward the refill request to us. Please allow 3 business days for your refill to be completed.          Additional Information About Your Visit        Wikisway Information     Wikisway gives you secure access to your electronic health record. If you see a primary care provider, you can also send messages to your care team and make appointments. If you have questions, please call your primary care clinic.  If you do not have a primary care provider, please call [phone] and they will assist you.        Care EveryWhere ID     This is your Care EveryWhere ID. This could be used by other organizations to access your [facility] medical records  [id number]        Your Vitals Were     Pulse Respirations Pulse Oximetry BMI (Body Mass Index)          80 16 96% 26.57 kg/m2         Blood Pressure from Last 3 Encounters:   12/07/17 152/74   11/24/17 138/76   07/25/17 (P) 126/62    Weight from Last 3 Encounters:   12/07/17 84 kg (185 lb 3.2 oz)   11/24/17 84 kg (185 lb 3.2 oz)   07/25/17 80.3 kg (177 lb)              Today, you had the following     No orders found for display       Primary Care Provider Office Phone # Fax #    Yaneli Dozier [phone] [address]  [facility] [address]        Equal Access to Services     SHYAM RIVERA AH: marianna Padilla,  tiff nolbertovanda juanconnie friend jangia zazuetaaan ah. Hui [facility] [phone].    ATENCIÓN: Si lola chambers, tiene a ann disposición servicios gratuitos de asistencia lingüística. Roddy al [phone].    We comply with applicable federal civil rights laws and [place] laws. We do not discriminate on the basis of race, color, national origin, age, disability, sex, sexual orientation, or gender identity.            Thank you!     Thank you for choosing [facility]  for your care. Our goal is always to provide you with excellent care. Hearing back from our patients is one way we can continue to improve our services. Please take a few minutes to complete the written survey that you may receive in the mail after your visit with us. Thank you!             Your Updated Medication List - Protect others around you: Learn how to safely use, store and throw away your medicines at www.disposemymeds.org.          This list is accurate as of: 12/7/17 11:59 PM.  Always use your most recent med list.                   Brand Name Dispense Instructions for use Diagnosis    albuterol 108 (90 BASE) MCG/ACT Inhaler    PROAIR HFA    25.5 Inhaler    INHALE 2 PUFFS INTO THE LUNGS EVERY 6 HOURS AS NEEDED FOR SHORTNESS OF BREATH / DYSPNEA OR WHEEZING    COPD exacerbation (H)       * alfuzosin 10 MG 24 hr tablet    UROXATRAL    90 tablet    Take 1 tablet (10 mg) by mouth daily After a meal    Benign non-nodular prostatic hyperplasia with lower urinary tract symptoms       * alfuzosin 10 MG 24 hr tablet    UROXATRAL    90 tablet    TAKE 1 TABLET (10 MG) BY MOUTH DAILY AFTER A MEAL    Benign non-nodular prostatic hyperplasia with lower urinary tract symptoms       aspirin 81 MG tablet      Take 1 tablet by mouth 2 times daily        budesonide-formoterol 160-4.5 MCG/ACT Inhaler    SYMBICORT    3 Inhaler    Inhale 2 puffs into the lungs 2 times daily    COPD exacerbation (H)       carisoprodol 350  MG tablet    SOMA    30 tablet    TAKE 1 TABLET BY MOUTH 3 TIMES DAILY AND 1 TABLET AT BEDTIME    Lumbar arthropathy (H)       fish oil-omega-3 fatty acids 1000 MG capsule      take one tablet twice daily        HYDROcodone-acetaminophen 5-325 MG per tablet    NORCO    180 tablet    Take 1 tablet by mouth every 4 hours as needed    Spinal stenosis of lumbar region with neurogenic claudication       levalbuterol 0.31 MG/3ML neb solution    XOPENEX    720 mL    INHALE 1 VIAL (3ML) BY NEBULIZATION EVERY 4 HOURS AS NEEDED FOR WHEEZING OR SHORTNESS OF BREATH.    COPD exacerbation (H)       lidocaine 5 % Patch    LIDODERM    30 patch    Place 1 patch onto the skin every 24 hours    Chronic bilateral low back pain without sciatica       lisinopril 40 MG tablet    PRINIVIL/ZESTRIL    90 tablet    TAKE 1 TABLET (40 MG) BY MOUTH DAILY    Essential hypertension, benign       methimazole 5 MG tablet    TAPAZOLE     Take 5 mg by mouth every other day        metoprolol 25 MG 24 hr tablet    TOPROL-XL    90 tablet    TAKE 1 TABLET BY MOUTH DAILY    Essential hypertension, benign       MULTIVITAMIN PO      Take by mouth daily        nitroGLYcerin 0.4 MG sublingual tablet    NITROSTAT    30 tablet    Place 1 tablet (0.4 mg) under the tongue See Admin Instructions for chest pain    Coronary artery disease involving native coronary artery of native heart without angina pectoris       roflumilast 500 MCG Tabs tablet    DALIRESP    31 tablet    Take 1 tablet (500 mcg) by mouth daily    Chronic obstructive pulmonary disease, unspecified COPD type (H)       rosuvastatin 10 MG tablet    CRESTOR    90 tablet    TAKE 1 TABLET (10 MG) BY MOUTH DAILY    Hyperlipidemia LDL goal <100       * Notice:  This list has 2 medication(s) that are the same as other medications prescribed for you. Read the directions carefully, and ask your doctor or other care provider to review them with you.

## 2017-12-07 NOTE — LETTER
12/7/2017       RE: Harrison Thomas  [address]  [place] [address]     Dear Colleague,    Thank you for referring your patient, Harrison Thomas, to the [facility] at [facility]. Please see a copy of my visit note below.    Pulmonary Clinic Follow Up Visit      Reason for visit: Follow up COPD    History of Present Illness:   is a 72-year-old gentleman with a history of severe COPD who is here for follow up. He was last seen in the clinic in June. His current  COPD medications are albuterol rescue inhaler, xopenex nebs prn, symbicort 2 puffs bid and roflumilast. He has tried Spiriva in the past and believes that it led to urinary frequency.     Since the last visit, he had 1 COPD exacerbation in November. He was seen by his PCP, was prescribed prednisone taper and azithromycin. He completed those treatment and got better. Otherwise, he has been doing well. He occasionally has cough with whitish sputum. He denied fever, chills, SOB. There has been no limitation in his activity. He does not smoke, has no pets. Recently got flu shot in September. Today, he has 2 major concerns:    - Tremor: He notices that he has hand tremor both sides. It usually happen with activity e.g. Holding/handling things. He discussed this once with his PCP and this tremor is thought to be from symbicort that he has been using. He has not tried stopping it so far.  - Low O2 saturation during sleeping: He monitors his O2 sat intermittently everyday and he notices that his O2 sat usually drops in the middle of the night e.g. 3AM to the lowest of 87%. Otherwise, he has been satting well on room air. He reports snoring and he sometimes has daytime sleepiness.    Review of Systems:  Constitutional: no weight change  Skin: negative  Eyes: negative  Ears/Nose/Throat: negative  Respiratory: per HPI  Cardiovascular: positive for h/o CAD,  HTN  Gastrointestinal: negative  Genitourinary: negative  Musculoskeletal: occasional leg swelling  Neurologic: negative  Psychiatric: negative  Hematologic/Lymphatic/Immunologic: negative  Endocrine: positive for thyroid nodule    Past Medical History:   Diagnosis Date     Allergic rhinitis, cause unspecified 7/8/2005     Back ache     narcotic agreement signed 09/23/11     Bruit      CAD (coronary artery disease) 12/29/97     stent placement to the proximal circumflex coronary artery.   At that time, he was noted to have an 80-90% lesion in the nondominant right coronary artery, which was treated medically, and a 50% left anterior descending stenosis after the first diagonal branch, 11/2015 Nuclear study - small-med inflateral and idstal inf nontransmural scar with mild ischemia in distal inf/inflateral wall, EF 56%     COPD (chronic obstructive pulmonary disease) (H)      Essential hypertension, benign 11/11/2003     HTN (hypertension)      Hyperlipidemia      Immunodeficiency (H)     IG SUBCLASS 2     Melanocytic nevi of lip      Mixed hyperlipidemia 11/11/2003     Myocardial infarction      Retina hole 2014, rt    surgery by Dr Murdock     Syncopal episode 6-09     Thyroid nodule      TIA (transient ischaemic attack) 6-09     Current Outpatient Prescriptions   Medication     roflumilast (DALIRESP) 500 MCG TABS tablet     HYDROcodone-acetaminophen (NORCO) 5-325 MG per tablet     levalbuterol (XOPENEX) 0.31 MG/3ML neb solution     metoprolol (TOPROL-XL) 25 MG 24 hr tablet     lisinopril (PRINIVIL/ZESTRIL) 40 MG tablet     rosuvastatin (CRESTOR) 10 MG tablet     lidocaine (LIDODERM) 5 % Patch     alfuzosin (UROXATRAL) 10 MG 24 hr tablet     alfuzosin (UROXATRAL) 10 MG 24 hr tablet     nitroglycerin (NITROSTAT) 0.4 MG sublingual tablet     budesonide-formoterol (SYMBICORT) 160-4.5 MCG/ACT Inhaler     albuterol (ALBUTEROL) 108 (90 BASE) MCG/ACT inhaler     carisoprodol (SOMA) 350 MG tablet     methimazole  (TAPAZOLE) 5 MG tablet     Multiple Vitamins-Minerals (MULTIVITAMIN OR)     aspirin 81 MG tablet     FISH OIL 1000 MG OR CAPS     No current facility-administered medications for this visit.      Allergies   Allergen Reactions     Levaquin Difficulty breathing     Plavix [Clopidogrel Bisulfate] Itching     Atorvastatin Calcium Cramps     lipitor     Cats      Dogs      Hctz [Hydrochlorothiazide]      Rash on legs     Physical Exam   Constitutional: He is oriented to person, place, and time and well-developed, well-nourished, and in no distress. No distress.   HENT:   Head: Normocephalic and atraumatic.   Nose: Nose normal.   Eyes: Conjunctivae are normal. Pupils are equal, round, and reactive to light. Left eye exhibits no discharge. No scleral icterus.   Cardiovascular: Normal rate, regular rhythm and normal heart sounds.  Exam reveals no gallop and no friction rub.    No murmur heard.  Pulmonary/Chest: Effort normal. No respiratory distress. He has wheezes. He has no rales. He exhibits no tenderness.   Abdominal: Soft. He exhibits no distension.   Musculoskeletal: He exhibits no edema.   Neurological: He is alert and oriented to person, place, and time. He displays tremor.   (Intention tremor)   Skin: No rash noted. Nails show no clubbing.     Investigations:  PFT:  PFT Latest Ref Rng & Units 12/7/2017   FVC L 1.89   FEV1 L 1.07   FVC% % 46   FEV1% % 34     6-minute walk test:  Total distance 1650 ft; LLN 1368 ft. Has been satting well. No interruption of the walk. No desaturation.      Assessment and plan:   is a 72-year-old gentleman with a history of severe COPD who is here for follow up.    # COPD, GOLD 3  Currently, he does not c/o SOB/ACEVES and does not require any O2 supplement. His PFT still shows obstructive lung disease. However, the FEV1 and FVC improves when compared to his PFT in June 2017. He had 1 attack over the past 6 months. Overall, he is now categorized to GOLD3. Thus, he needs to be  on LAMA/LABA +/- ICS. He has been using symbicort so we will try adding LAMA for him. He experienced urinary frequency from using Spiriva in the past and there has been a question whether his insurance would cover the newer medication for him. We will contact our nurse to co-ordinate and find alternative LAMA for him, likely to be Incruse. Thus, he will be continuing the same regimen and the new LAMA. He also has azithromycin and prednisone supply at home for potential exacerbation.    # Intention tremor  Apart from albuterol, xopenex and symbicort, none of his current meds could cause this. He has not been using albuterol and xopenex on the daily basis (only use with severe exacerbation), tremor can be a rare side effect of symbicort (<1%), thus, symbicort is most likely the culprit of his symptom. Other differential includes essential tremor. We plan to have him discontinue symbicort for 2-3 days and see if there is any improvement in his tremor. If so, we will try to change the inhaler again. Otherwise, he should resume symbicort.     # Nocturnal desaturation  He reports snoring and potential daytime sleepiness. BRENNEN is high in th differentials. He has an appointment for the sleep study in the next couple weeks. We will follow his result.    Patient seen and discussed with .    Michael Velásquez MD  PGY-1 Internal Medicine  Pager [phone]      Pulmonary Attending Attestation  I saw and examined the patient with Dr. Velásquez, confirming key aspects of the history and exam.  I personally reviewed the recent Xrays and other labs.  The resident s note reflects our detailed discussion of the findings, assessment and plan.  We will try cautious addition of LAMA at relatively low cost with observation for worsening urinary frequency.  Agree with plan for PSG re snoring/OSAS.  We could possibly swap out the LABA for LAMA given tremor, but before going to this trouble, I advised him to hold symbicort and see  if tremor improved after several days (suggesting its due to LABA) or did not (suggesting its an essential tremor and he can continue symbicort w/o concern that it is contributing)  Khoa Handy MD      Again, thank you for allowing me to participate in the care of your patient.      Sincerely,    Khoa Handy MD

## 2017-12-07 NOTE — NURSING NOTE
Chief Complaint   Patient presents with     COPD     Patient is being seen for COPD follow up      Cintia Cagle CMA at 10:48 AM on 12/7/2017

## 2017-12-07 NOTE — PROGRESS NOTES
Pulmonary Clinic Follow Up Visit      Reason for visit: Follow up COPD    History of Present Illness:   is a 72-year-old gentleman with a history of severe COPD who is here for follow up. He was last seen in the clinic in June. His current  COPD medications are albuterol rescue inhaler, xopenex nebs prn, symbicort 2 puffs bid and roflumilast. He has tried Spiriva in the past and believes that it led to urinary frequency.     Since the last visit, he had 1 COPD exacerbation in November. He was seen by his PCP, was prescribed prednisone taper and azithromycin. He completed those treatment and got better. Otherwise, he has been doing well. He occasionally has cough with whitish sputum. He denied fever, chills, SOB. There has been no limitation in his activity. He does not smoke, has no pets. Recently got flu shot in September. Today, he has 2 major concerns:    - Tremor: He notices that he has hand tremor both sides. It usually happen with activity e.g. Holding/handling things. He discussed this once with his PCP and this tremor is thought to be from symbicort that he has been using. He has not tried stopping it so far.  - Low O2 saturation during sleeping: He monitors his O2 sat intermittently everyday and he notices that his O2 sat usually drops in the middle of the night e.g. 3AM to the lowest of 87%. Otherwise, he has been satting well on room air. He reports snoring and he sometimes has daytime sleepiness.    Review of Systems:  Constitutional: no weight change  Skin: negative  Eyes: negative  Ears/Nose/Throat: negative  Respiratory: per HPI  Cardiovascular: positive for h/o CAD, HTN  Gastrointestinal: negative  Genitourinary: negative  Musculoskeletal: occasional leg swelling  Neurologic: negative  Psychiatric: negative  Hematologic/Lymphatic/Immunologic: negative  Endocrine: positive for thyroid nodule    Past Medical History:   Diagnosis Date     Allergic rhinitis, cause unspecified 7/8/2005      Back ache     narcotic agreement signed 09/23/11     Bruit      CAD (coronary artery disease) 12/29/97     stent placement to the proximal circumflex coronary artery.   At that time, he was noted to have an 80-90% lesion in the nondominant right coronary artery, which was treated medically, and a 50% left anterior descending stenosis after the first diagonal branch, 11/2015 Nuclear study - small-med inflateral and idstal inf nontransmural scar with mild ischemia in distal inf/inflateral wall, EF 56%     COPD (chronic obstructive pulmonary disease) (H)      Essential hypertension, benign 11/11/2003     HTN (hypertension)      Hyperlipidemia      Immunodeficiency (H)     IG SUBCLASS 2     Melanocytic nevi of lip      Mixed hyperlipidemia 11/11/2003     Myocardial infarction      Retina hole 2014, rt    surgery by Dr Murdock     Syncopal episode 6-09     Thyroid nodule      TIA (transient ischaemic attack) 6-09     Current Outpatient Prescriptions   Medication     roflumilast (DALIRESP) 500 MCG TABS tablet     HYDROcodone-acetaminophen (NORCO) 5-325 MG per tablet     levalbuterol (XOPENEX) 0.31 MG/3ML neb solution     metoprolol (TOPROL-XL) 25 MG 24 hr tablet     lisinopril (PRINIVIL/ZESTRIL) 40 MG tablet     rosuvastatin (CRESTOR) 10 MG tablet     lidocaine (LIDODERM) 5 % Patch     alfuzosin (UROXATRAL) 10 MG 24 hr tablet     alfuzosin (UROXATRAL) 10 MG 24 hr tablet     nitroglycerin (NITROSTAT) 0.4 MG sublingual tablet     budesonide-formoterol (SYMBICORT) 160-4.5 MCG/ACT Inhaler     albuterol (ALBUTEROL) 108 (90 BASE) MCG/ACT inhaler     carisoprodol (SOMA) 350 MG tablet     methimazole (TAPAZOLE) 5 MG tablet     Multiple Vitamins-Minerals (MULTIVITAMIN OR)     aspirin 81 MG tablet     FISH OIL 1000 MG OR CAPS     No current facility-administered medications for this visit.      Allergies   Allergen Reactions     Levaquin Difficulty breathing     Plavix [Clopidogrel Bisulfate] Itching     Atorvastatin Calcium  Cramps     lipitor     Cats      Dogs      Hctz [Hydrochlorothiazide]      Rash on legs     Physical Exam   Constitutional: He is oriented to person, place, and time and well-developed, well-nourished, and in no distress. No distress.   HENT:   Head: Normocephalic and atraumatic.   Nose: Nose normal.   Eyes: Conjunctivae are normal. Pupils are equal, round, and reactive to light. Left eye exhibits no discharge. No scleral icterus.   Cardiovascular: Normal rate, regular rhythm and normal heart sounds.  Exam reveals no gallop and no friction rub.    No murmur heard.  Pulmonary/Chest: Effort normal. No respiratory distress. He has wheezes. He has no rales. He exhibits no tenderness.   Abdominal: Soft. He exhibits no distension.   Musculoskeletal: He exhibits no edema.   Neurological: He is alert and oriented to person, place, and time. He displays tremor.   (Intention tremor)   Skin: No rash noted. Nails show no clubbing.     Investigations:  PFT:  PFT Latest Ref Rng & Units 12/7/2017   FVC L 1.89   FEV1 L 1.07   FVC% % 46   FEV1% % 34     6-minute walk test:  Total distance 1650 ft; LLN 1368 ft. Has been satting well. No interruption of the walk. No desaturation.      Assessment and plan:   is a 72-year-old gentleman with a history of severe COPD who is here for follow up.    # COPD, GOLD 3  Currently, he does not c/o SOB/ACEVES and does not require any O2 supplement. His PFT still shows obstructive lung disease. However, the FEV1 and FVC improves when compared to his PFT in June 2017. He had 1 attack over the past 6 months. Overall, he is now categorized to GOLD3. Thus, he needs to be on LAMA/LABA +/- ICS. He has been using symbicort so we will try adding LAMA for him. He experienced urinary frequency from using Spiriva in the past and there has been a question whether his insurance would cover the newer medication for him. We will contact our nurse to co-ordinate and find alternative LAMA for him, likely  to be Incruse. Thus, he will be continuing the same regimen and the new LAMA. He also has azithromycin and prednisone supply at home for potential exacerbation.    # Intention tremor  Apart from albuterol, xopenex and symbicort, none of his current meds could cause this. He has not been using albuterol and xopenex on the daily basis (only use with severe exacerbation), tremor can be a rare side effect of symbicort (<1%), thus, symbicort is most likely the culprit of his symptom. Other differential includes essential tremor. We plan to have him discontinue symbicort for 2-3 days and see if there is any improvement in his tremor. If so, we will try to change the inhaler again. Otherwise, he should resume symbicort.     # Nocturnal desaturation  He reports snoring and potential daytime sleepiness. BRENNEN is high in th differentials. He has an appointment for the sleep study in the next couple weeks. We will follow his result.    Patient seen and discussed with .    Michael Velásquez MD  PGY-1 Internal Medicine  Pager [phone]      Pulmonary Attending Attestation  I saw and examined the patient with Dr. Velásquez, confirming key aspects of the history and exam.  I personally reviewed the recent Xrays and other labs.  The resident s note reflects our detailed discussion of the findings, assessment and plan.  We will try cautious addition of LAMA at relatively low cost with observation for worsening urinary frequency.  Agree with plan for PSG re snoring/OSAS.  We could possibly swap out the LABA for LAMA given tremor, but before going to this trouble, I advised him to hold symbicort and see if tremor improved after several days (suggesting its due to LABA) or did not (suggesting its an essential tremor and he can continue symbicort w/o concern that it is contributing)  Khoa Handy MD

## 2017-12-13 ENCOUNTER — HOSPITAL ENCOUNTER (OUTPATIENT)
Dept: CARDIOLOGY | Facility: CLINIC | Age: 72
Discharge: HOME | End: 2017-12-13
Attending: INTERNAL MEDICINE | Admitting: INTERNAL MEDICINE
Payer: COMMERCIAL

## 2017-12-13 DIAGNOSIS — I25.10 CORONARY ARTERY DISEASE INVOLVING NATIVE CORONARY ARTERY OF NATIVE HEART WITHOUT ANGINA PECTORIS: ICD-10-CM

## 2017-12-13 PROCEDURE — 93306 TTE W/DOPPLER COMPLETE: CPT

## 2017-12-13 PROCEDURE — 93306 TTE W/DOPPLER COMPLETE: CPT | Mod: 26 | Performed by: INTERNAL MEDICINE

## 2017-12-15 DIAGNOSIS — J44.1 COPD EXACERBATION (H): ICD-10-CM

## 2017-12-15 DIAGNOSIS — D47.2 MONOCLONAL PARAPROTEINEMIA: ICD-10-CM

## 2017-12-15 PROCEDURE — 86335 IMMUNFIX E-PHORSIS/URINE/CSF: CPT | Performed by: INTERNAL MEDICINE

## 2017-12-15 PROCEDURE — 82784 ASSAY IGA/IGD/IGG/IGM EACH: CPT | Performed by: INTERNAL MEDICINE

## 2017-12-15 PROCEDURE — 82784 ASSAY IGA/IGD/IGG/IGM EACH: CPT | Mod: 59 | Performed by: INTERNAL MEDICINE

## 2017-12-15 PROCEDURE — 86334 IMMUNOFIX E-PHORESIS SERUM: CPT | Performed by: INTERNAL MEDICINE

## 2017-12-15 PROCEDURE — 36415 COLL VENOUS BLD VENIPUNCTURE: CPT | Performed by: INTERNAL MEDICINE

## 2017-12-18 LAB
DLCOCOR-%PRED-PRE: 61 %
DLCOCOR-PRE: 15.75 ML/MIN/MMHG
DLCOUNC-%PRED-PRE: 60 %
DLCOUNC-PRE: 15.62 ML/MIN/MMHG
DLCOUNC-PRED: 25.68 ML/MIN/MMHG
ERV-%PRED-PRE: 64 %
ERV-PRE: 0.64 L
ERV-PRED: 0.99 L
EXPTIME-PRE: 9.01 SEC
FEF2575-%PRED-PRE: 21 %
FEF2575-PRE: 0.5 L/SEC
FEF2575-PRED: 2.38 L/SEC
FEFMAX-%PRED-PRE: 39 %
FEFMAX-PRE: 3.17 L/SEC
FEFMAX-PRED: 8.11 L/SEC
FEV1-%PRED-PRE: 34 %
FEV1-PRE: 1.07 L
FEV1FEV6-PRE: 56 %
FEV1FEV6-PRED: 78 %
FEV1FVC-PRE: 56 %
FEV1FVC-PRED: 76 %
FEV1SVC-PRE: 52 %
FEV1SVC-PRED: 68 %
FIFMAX-PRE: 3.49 L/SEC
FVC-%PRED-PRE: 46 %
FVC-PRE: 1.89 L
FVC-PRED: 4.1 L
IC-%PRED-PRE: 39 %
IC-PRE: 1.41 L
IC-PRED: 3.59 L
IGA SERPL-MCNC: 172 MG/DL (ref 70–380)
IGG SERPL-MCNC: 881 MG/DL (ref 695–1620)
IGM SERPL-MCNC: 305 MG/DL (ref 60–265)
PROT ELPH PNL UR ELPH: NORMAL
PROT PATTERN SERPL IFE-IMP: ABNORMAL
VA-%PRED-PRE: 57 %
VA-PRE: 3.78 L
VC-%PRED-PRE: 44 %
VC-PRE: 2.04 L
VC-PRED: 4.58 L

## 2017-12-19 RX ORDER — ALBUTEROL SULFATE 90 UG/1
AEROSOL, METERED RESPIRATORY (INHALATION)
Qty: 18 G | Refills: 0 | Status: SHIPPED | OUTPATIENT
Start: 2017-12-19 | End: 2018-03-26

## 2017-12-19 NOTE — TELEPHONE ENCOUNTER
Requested Prescriptions   Pending Prescriptions Disp Refills     PROAIR  (90 BASE) MCG/ACT inhaler [Pharmacy Med Name: PROAIR HFA 90 MCG INHALER] 25.5 Inhaler 1     Sig: INHALE 2 PUFFS INTO THE LUNGS EVERY 6 HOURS AS NEEDED FOR SHORTNESS OF BREATH / DYSPNEA OR WHEEZING    Asthma Maintenance Inhalers - Anticholinergics Passed    12/19/2017 11:46 AM       Passed - Patient is age 12 years or older       Passed - Recent or future visit with authorizing provider's specialty    Patient had office visit in the last year or has a visit in the next 30 days with authorizing provider.  See chart review.               Future Office Visit:    Next 5 appointments (look out 90 days)     Jan 03, 2018  9:15 AM CST   Return Visit with Abimael Fatima MD   [facility] ([facility])    [address] [place] [id number]   [phone]            Feb 26, 2018  9:30 AM CST   Office Visit with Yaneli Dozier MD   [facility] ([facility])    [address] [place] [id number] [phone]                 Prescription approved per [facility] Refill Protocol.  Zaria HAYS RN

## 2017-12-21 ENCOUNTER — OFFICE VISIT (OUTPATIENT)
Dept: SLEEP MEDICINE | Facility: CLINIC | Age: 72
End: 2017-12-21
Payer: COMMERCIAL

## 2017-12-21 VITALS
HEART RATE: 69 BPM | RESPIRATION RATE: 18 BRPM | SYSTOLIC BLOOD PRESSURE: 151 MMHG | DIASTOLIC BLOOD PRESSURE: 76 MMHG | HEIGHT: 69 IN | WEIGHT: 180 LBS | BODY MASS INDEX: 26.66 KG/M2 | OXYGEN SATURATION: 93 %

## 2017-12-21 DIAGNOSIS — J43.9 PULMONARY EMPHYSEMA, UNSPECIFIED EMPHYSEMA TYPE (H): ICD-10-CM

## 2017-12-21 DIAGNOSIS — G47.30 SLEEP DISORDER BREATHING: Primary | ICD-10-CM

## 2017-12-21 PROCEDURE — 99243 OFF/OP CNSLTJ NEW/EST LOW 30: CPT | Performed by: PSYCHIATRY & NEUROLOGY

## 2017-12-21 NOTE — PATIENT INSTRUCTIONS

## 2017-12-21 NOTE — MR AVS SNAPSHOT
After Visit Summary   12/21/2017    Harrison Thomas    MRN: [id number]           Patient Information     Date Of Birth          1945        Visit Information        Provider Department      12/21/2017 10:00 AM Jerry Mccabe MD [facility]        Today's Diagnoses     Sleep disorder breathing    -  1    Pulmonary emphysema, unspecified emphysema type (H)          Care Instructions      Your BMI is Body mass index is 26.58 kg/(m^2).  Weight management is a personal decision.  If you are interested in exploring weight loss strategies, the following discussion covers the approaches that may be successful. Body mass index (BMI) is one way to tell whether you are at a healthy weight, overweight, or obese. It measures your weight in relation to your height.  A BMI of 18.5 to 24.9 is in the healthy range. A person with a BMI of 25 to 29.9 is considered overweight, and someone with a BMI of 30 or greater is considered obese. More than two-thirds of American adults are considered overweight or obese.  Being overweight or obese increases the risk for further weight gain. Excess weight may lead to heart disease and diabetes.  Creating and following plans for healthy eating and physical activity may help you improve your health.  Weight control is part of healthy lifestyle and includes exercise, emotional health, and healthy eating habits. Careful eating habits lifelong are the mainstay of weight control. Though there are significant health benefits from weight loss, long-term weight loss with diet alone may be very difficult to achieve- studies show long-term success with dietary management in less than 10% of people. Attaining a healthy weight may be especially difficult to achieve in those with severe obesity. In some cases, medications, devices and surgical management might be considered.  What can you do?  If you are overweight or obese and are interested in methods for  weight loss, you should discuss this with your provider.     Consider reducing daily calorie intake by 500 calories.     Keep a food journal.     Avoiding skipping meals, consider cutting portions instead.    Diet combined with exercise helps maintain muscle while optimizing fat loss. Strength training is particularly important for building and maintaining muscle mass. Exercise helps reduce stress, increase energy, and improves fitness. Increasing exercise without diet control, however, may not burn enough calories to loose weight.       Start walking three days a week 10-20 minutes at a time    Work towards walking thirty minutes five days a week     Eventually, increase the speed of your walking for 1-2 minutes at time    In addition, we recommend that you review healthy lifestyles and methods for weight loss available through the National Institutes of Health patient information sites:  http://win.niddk.nih.gov/publications/index.htm    And look into health and wellness programs that may be available through your health insurance provider, employer, local community center, or katie club.    Weight management plan: Patient was referred to their PCP to discuss a diet and exercise plan.              Follow-ups after your visit        Your next 10 appointments already scheduled     Jan 03, 2018  9:15 AM CST   Return Visit with Abimael Fatima MD   [facility] ([facility])    [address]  [facility] [place] [facility] [address] [phone]            Feb 12, 2018  8:30 PM CST   PSG Split with BED 6 SH SLEEP   [facility] ([facility])    [address]  [facility] [id number] [phone]            Feb 26, 2018  9:30 AM CST   Office Visit with Yaneli Dozier MD   [facility]    [address] [id number] [phone]           Bring a current list of meds  and any records pertaining to this visit. For Physicals, please bring immunization records and any forms needing to be filled out. Please arrive 10 minutes early to complete paperwork.            Feb 26, 2018 11:00 AM CST   Return Sleep Patient with Jerry Mccabe MD   [facility] ([facility])    [address] [address] [phone]            Jun 14, 2018 10:30 AM CDT   (Arrive by 10:15 AM)   Return Visit with Khoa Handy MD   [facility] ([facility])    [address]  3rd [address]  [facility] [address] [phone]              Future tests that were ordered for you today     Open Future Orders        Priority Expected Expires Ordered    Comprehensive Sleep Study Routine  6/19/2018 12/21/2017            Who to contact     If you have questions or need follow up information about today's clinic visit or your schedule please contact [facility] directly at [phone].  Normal or non-critical lab and imaging results will be communicated to you by [employer], letter or phone within 4 business days after the clinic has received the results. If you do not hear from us within 7 days, please contact the clinic through [employer] or phone. If you have a critical or abnormal lab result, we will notify you by phone as soon as possible.  Submit refill requests through [employer] or call your pharmacy and they will forward the refill request to us. Please allow 3 business days for your refill to be completed.          Additional Information About Your Visit        [employer] Information     [employer] gives you secure access to your electronic health record. If you see a primary care provider, you can also send messages to your care team and make appointments. If you have questions, please call your primary care clinic.  If you do not have a primary care provider, please  "call [phone] and they will assist you.        Care EveryWhere ID     This is your Care EveryWhere ID. This could be used by other organizations to access your [facility] medical records  [id number]        Your Vitals Were     Pulse Respirations Height Pulse Oximetry BMI (Body Mass Index)       69 18 1.753 m (5' 9\") 93% 26.58 kg/m2        Blood Pressure from Last 3 Encounters:   12/21/17 151/76   12/07/17 152/74   11/24/17 138/76    Weight from Last 3 Encounters:   12/21/17 81.6 kg (180 lb)   12/07/17 84 kg (185 lb 3.2 oz)   11/24/17 84 kg (185 lb 3.2 oz)              We Performed the Following     SLEEP EVALUATION & MANAGEMENT REFERRAL - ADULT -[facility] [phone]  (Age 18 and up)        Primary Care Provider Office Phone # Fax #    Yaneli Dozier [phone] [address] [address]        Equal Access to Services     [facility]: Hadii mann live hadasho Sojose, waaxda luqadaha, qaybta kaalmayuri sanchez, connie silva . So [facility] [phone].    ATENCIÓN: Si habla español, tiene a ann disposición servicios gratuitos de asistencia lingüística. Roddy al [phone].    We comply with applicable federal civil rights laws and [place] laws. We do not discriminate on the basis of race, color, national origin, age, disability, sex, sexual orientation, or gender identity.            Thank you!     Thank you for choosing [facility]  for your care. Our goal is always to provide you with excellent care. Hearing back from our patients is one way we can continue to improve our services. Please take a few minutes to complete the written survey that you may receive in the mail after your visit with us. Thank you!             Your Updated Medication List - Protect others around you: Learn how to safely use, store and throw away your medicines at www.disposemymeds.org.          This list is accurate as of: " 12/21/17 10:29 AM.  Always use your most recent med list.                   Brand Name Dispense Instructions for use Diagnosis    * alfuzosin 10 MG 24 hr tablet    UROXATRAL    90 tablet    Take 1 tablet (10 mg) by mouth daily After a meal    Benign non-nodular prostatic hyperplasia with lower urinary tract symptoms       * alfuzosin 10 MG 24 hr tablet    UROXATRAL    90 tablet    TAKE 1 TABLET (10 MG) BY MOUTH DAILY AFTER A MEAL    Benign non-nodular prostatic hyperplasia with lower urinary tract symptoms       aspirin 81 MG tablet      Take 1 tablet by mouth 2 times daily        budesonide-formoterol 160-4.5 MCG/ACT Inhaler    SYMBICORT    3 Inhaler    Inhale 2 puffs into the lungs 2 times daily    COPD exacerbation (H)       carisoprodol 350 MG tablet    SOMA    30 tablet    TAKE 1 TABLET BY MOUTH 3 TIMES DAILY AND 1 TABLET AT BEDTIME    Lumbar arthropathy (H)       fish oil-omega-3 fatty acids 1000 MG capsule      take one tablet twice daily        HYDROcodone-acetaminophen 5-325 MG per tablet    NORCO    180 tablet    Take 1 tablet by mouth every 4 hours as needed    Spinal stenosis of lumbar region with neurogenic claudication       levalbuterol 0.31 MG/3ML neb solution    XOPENEX    720 mL    INHALE 1 VIAL (3ML) BY NEBULIZATION EVERY 4 HOURS AS NEEDED FOR WHEEZING OR SHORTNESS OF BREATH.    COPD exacerbation (H)       lidocaine 5 % Patch    LIDODERM    30 patch    Place 1 patch onto the skin every 24 hours    Chronic bilateral low back pain without sciatica       lisinopril 40 MG tablet    PRINIVIL/ZESTRIL    90 tablet    TAKE 1 TABLET (40 MG) BY MOUTH DAILY    Essential hypertension, benign       methimazole 5 MG tablet    TAPAZOLE     Take 5 mg by mouth every other day        metoprolol 25 MG 24 hr tablet    TOPROL-XL    90 tablet    TAKE 1 TABLET BY MOUTH DAILY    Essential hypertension, benign       MULTIVITAMIN PO      Take by mouth daily        nitroGLYcerin 0.4 MG sublingual tablet    NITROSTAT    30  tablet    Place 1 tablet (0.4 mg) under the tongue See Admin Instructions for chest pain    Coronary artery disease involving native coronary artery of native heart without angina pectoris       PROAIR  (90 BASE) MCG/ACT Inhaler   Generic drug:  albuterol     18 g    INHALE 2 PUFFS INTO THE LUNGS EVERY 6 HOURS AS NEEDED FOR SHORTNESS OF BREATH / DYSPNEA OR WHEEZING    COPD exacerbation (H)       roflumilast 500 MCG Tabs tablet    DALIRESP    31 tablet    Take 1 tablet (500 mcg) by mouth daily    Chronic obstructive pulmonary disease, unspecified COPD type (H)       rosuvastatin 10 MG tablet    CRESTOR    90 tablet    TAKE 1 TABLET (10 MG) BY MOUTH DAILY    Hyperlipidemia LDL goal <100       * Notice:  This list has 2 medication(s) that are the same as other medications prescribed for you. Read the directions carefully, and ask your doctor or other care provider to review them with you.

## 2017-12-21 NOTE — NURSING NOTE
"Chief Complaint   Patient presents with     Sleep Problem     Difficulty sleeping and breathing at night, wife has witnessed apnea       Initial /76  Pulse 69  Resp 18  Ht 1.753 m (5' 9\")  Wt 81.6 kg (180 lb)  SpO2 93%  BMI 26.58 kg/m2 Estimated body mass index is 26.58 kg/(m^2) as calculated from the following:    Height as of this encounter: 1.753 m (5' 9\").    Weight as of this encounter: 81.6 kg (180 lb).  Medication Reconciliation: complete   ESS 12  Neck 43cm  Alina Daley MA      "

## 2017-12-22 NOTE — PROGRESS NOTES
[facility] SLEEP DISORDER CENTER FOLLOWUP NOTE        This consultation is requested by Dr. Dozier to assist with concerns regarding sleep disordered breathing.      Harrison Thomas is 72 years old and has a longstanding history of COPD related to past history of smoking usually goes to bed between 11:00 and 12:00 at night, wakes up at 5:00 and 6:00 in the morning.  He often has fragmented sleep but even if he is able to sleep through the night, he wakes up feeling unrefreshed.  His [place] Sleepiness Scale score is 12/24 today.  He has been tired and sleepy while driving.      According to his spouse, he snores and snores loudly the vast majority of days.  He has been told that he has pauses in his breathing and has a dry throat, dry mouth on occasion.      He does not describe motor restlessness, nor does he describe cataplexy, sleep paralysis or hypnagogic or hypnopompic hallucinations.  He does not describe parasomnias.      PAST MEDICAL HISTORY:  As noted above is significant for COPD, dyslipidemia, thyrotoxicosis, hypertension mal monoclonal paraproteinemia, chronic pain syndrome, retinal hole.      MEDICATIONS:  Rosuvastatin, roflumilast, ProAir, nitroglycerin, metoprolol, lisinopril, lidocaine, levalbuterol, hydrocodone, carisoprodol, budesonide fuscin.        SOCIAL HISTORY:  He lives in [place].  He is .  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      FAMILY HISTORY:  There is a family history of malignancy, no known family history of sleep disordered breathing or restless legs syndrome.      REVIEW OF SYSTEMS:  A 10-point review of systems was ascertained.  Pertinent findings are all mentioned above.      PHYSICAL EXAMINATION:   VITAL SIGNS:  The patient's blood pressure was elevated on 2 readings.  The first reading was 151/76, the second reading was 159/82, pulse 69, respirations 18, oxygen saturations 93%.  Body mass index of 26.6 kg/m2.   HEENT:  Neck  circumference is 43 cm.   NEUROLOGIC SPECIALTY:  He is alert and oriented, has normal memory and language.  Mood and affect are congruent.  Funduscopic examination reveals normal retinal vasculature.  Cranial nerves II-XII including extraocular movements are normal.  Motor examination reveals normal bulk, tone and strength in the upper and lower extremities.  Deep tendon reflexes are normoactive.  Toes are downgoing.  Coordination, sensory and gait testing are all normal.   VASCULAR:  Warm feet and good pulses.  No pitting edema.      ASSESSMENT:  Mr. Epstein is 72 years old.  He has a concerning history of COPD, witnessed apneas and nonrestorative sleep.  Particularly concerning is his sleepiness while driving.  Will go ahead and arrange for him to have an in-laboratory polysomnogram.  We discussed the pathophysiology, investigation and management of sleep disordered breathing.  Since he has COPD, he is at risk for hypoxemia, we will keep an eye on that during the sleep study.  We will also watch his transcutaneous CO2 monitoring.      The patient will follow up with me after the study is completed in the meanwhile, it is critically important that he not operate a motor vehicle if he is tired or sleepy and he indicates that he understands.      I look forward to seeing him again.         LAVERNE SANCHEZ MD             D: 2017 10:17   T: 2017 10:07   MT: HEMA      Name:     ABDIRASHID EPSTEIN   MRN:      [id number]        Account:      [id number]   :      1945           Visit Date:   2017      Document: [id number]       cc: Yaneli Dozier MD

## 2018-01-03 ENCOUNTER — OFFICE VISIT (OUTPATIENT)
Dept: CARDIOLOGY | Facility: CLINIC | Age: 73
End: 2018-01-03
Attending: INTERNAL MEDICINE
Payer: COMMERCIAL

## 2018-01-03 VITALS
HEIGHT: 69 IN | DIASTOLIC BLOOD PRESSURE: 95 MMHG | SYSTOLIC BLOOD PRESSURE: 179 MMHG | HEART RATE: 64 BPM | WEIGHT: 181.5 LBS | OXYGEN SATURATION: 94 % | BODY MASS INDEX: 26.88 KG/M2

## 2018-01-03 DIAGNOSIS — J43.9 PULMONARY EMPHYSEMA, UNSPECIFIED EMPHYSEMA TYPE (H): ICD-10-CM

## 2018-01-03 DIAGNOSIS — E78.5 HYPERLIPIDEMIA LDL GOAL <100: ICD-10-CM

## 2018-01-03 DIAGNOSIS — R06.02 SOB (SHORTNESS OF BREATH): ICD-10-CM

## 2018-01-03 DIAGNOSIS — I25.10 CORONARY ARTERY DISEASE INVOLVING NATIVE CORONARY ARTERY OF NATIVE HEART WITHOUT ANGINA PECTORIS: Primary | ICD-10-CM

## 2018-01-03 DIAGNOSIS — I10 BENIGN ESSENTIAL HYPERTENSION: ICD-10-CM

## 2018-01-03 PROCEDURE — 99214 OFFICE O/P EST MOD 30 MIN: CPT | Performed by: INTERNAL MEDICINE

## 2018-01-03 RX ORDER — ROSUVASTATIN CALCIUM 10 MG/1
TABLET, COATED ORAL
Qty: 90 TABLET | Refills: 1 | COMMUNITY
Start: 2018-01-03 | End: 2018-02-26

## 2018-01-03 RX ORDER — AMLODIPINE BESYLATE 2.5 MG/1
2.5 TABLET ORAL DAILY
Qty: 30 TABLET | Refills: 11 | Status: SHIPPED | OUTPATIENT
Start: 2018-01-03 | End: 2018-02-26
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
## 2018-01-03 NOTE — MR AVS SNAPSHOT
After Visit Summary   1/3/2018    Harrison Thomas    MRN: [id number]           Patient Information     Date Of Birth          1945        Visit Information        Provider Department      1/3/2018 9:15 AM Abimael Fatima MD [facility]        Today's Diagnoses     Coronary artery disease involving native coronary artery of native heart without angina pectoris    -  1    Hyperlipidemia LDL goal <100        SOB (shortness of breath)        Pulmonary emphysema, unspecified emphysema type (H)        Benign essential hypertension           Follow-ups after your visit        Additional Services     Follow-Up with Cardiac Advanced Practice Provider                 Your next 10 appointments already scheduled     Feb 12, 2018  8:30 PM CST   PSG Split with BED 6 SH SLEEP   [facility] ([facility])    [address] [address] [phone]            Feb 26, 2018  9:30 AM CST   [facility] Visit with [facility]eli [facility] MD   [facility]    [address] [facility] [id number] [phone]           Bring a current list of meds and any records pertaining to this visit. For Physicals, please bring immunization records and any forms needing to be filled out. Please arrive 10 minutes early to complete paperwork.            Feb 26, 2018 11:00 AM CST   Return Sleep Patient with Jerry Mccabe MD   [facility] ([facility])    [address] [address] [phone]            Jun 14, 2018 10:30 AM CDT   (Arrive by 10:15 AM)   Return Visit with Khoa Handy MD   [facility] ([facility])    [address]  3rd Floor  [facility] [address] [phone]              Future tests that were ordered for you today     Open Future  "Orders        Priority Expected Expires Ordered    Follow-Up with Cardiac Advanced Practice Provider Routine 2/28/2018 1/3/2019 1/3/2018            Who to contact     If you have questions or need follow up information about today's clinic visit or your schedule please contact [facility] directly at [phone].  Normal or non-critical lab and imaging results will be communicated to you by MyChart, letter or phone within 4 business days after the clinic has received the results. If you do not hear from us within 7 days, please contact the clinic through [employer] or phone. If you have a critical or abnormal lab result, we will notify you by phone as soon as possible.  Submit refill requests through [employer] or call your pharmacy and they will forward the refill request to us. Please allow 3 business days for your refill to be completed.          Additional Information About Your Visit        [employer] Information     [employer] gives you secure access to your electronic health record. If you see a primary care provider, you can also send messages to your care team and make appointments. If you have questions, please call your primary care clinic.  If you do not have a primary care provider, please call [phone] and they will assist you.        Care EveryWhere ID     This is your Care EveryWhere ID. This could be used by other organizations to access your [facility] medical records  [id number]        Your Vitals Were     Pulse Height Pulse Oximetry BMI (Body Mass Index)          64 1.753 m (5' 9\") 94% 26.8 kg/m2         Blood Pressure from Last 3 Encounters:   01/03/18 (!) 179/95   12/21/17 151/76   12/07/17 152/74    Weight from Last 3 Encounters:   01/03/18 82.3 kg (181 lb 8 oz)   12/21/17 81.6 kg (180 lb)   12/07/17 84 kg (185 lb 3.2 oz)              We Performed the Following     Follow-Up with Cardiologist          Today's Medication Changes          These changes are accurate " as of: 1/3/18  9:57 AM.  If you have any questions, ask your nurse or doctor.               Start taking these medicines.        Dose/Directions    amLODIPine 2.5 MG tablet   Commonly known as:  NORVASC   Used for:  Benign essential hypertension   Started by:  Abimael Fatima MD        Dose:  2.5 mg   Take 1 tablet (2.5 mg) by mouth daily   Quantity:  30 tablet   Refills:  11         These medicines have changed or have updated prescriptions.        Dose/Directions    rosuvastatin 10 MG tablet   Commonly known as:  CRESTOR   This may have changed:  See the new instructions.   Used for:  Hyperlipidemia LDL goal <100   Changed by:  Abimael Fatima MD        Hold for 2 weeks to see if rash improves   Quantity:  90 tablet   Refills:  1            Where to get your medicines      These medications were sent to [facility]/pharmacy #[id number] - [facility] [address] [place] [address]     Phone:  [phone]     amLODIPine 2.5 MG tablet                Primary Care Provider Office Phone # Fax #    Bhron Dozier [phone] [address] [facility] [address]  [facility] [address]        Equal Access to Services     FUNMI RIVERA AH: Hadii aad ku hadasho Sojose, waaxda luqadaha, qaybta kaalmada adeegyada, connie chatman. So [facility] [phone].    ATENCIÓN: Si habla español, tiene a ann disposición servicios gratuitos de asistencia lingüística. [facility] [phone].    We comply with applicable federal civil rights laws and [place] laws. We do not discriminate on the basis of race, color, national origin, age, disability, sex, sexual orientation, or gender identity.            Thank you!     Thank you for choosing [facility]  for your care. Our goal is always to provide you with excellent care. Hearing back from our patients is one way we can continue to improve our services. Please take a few minutes to complete  the written survey that you may receive in the mail after your visit with us. Thank you!             Your Updated Medication List - Protect others around you: Learn how to safely use, store and throw away your medicines at www.disposemymeds.org.          This list is accurate as of: 1/3/18  9:57 AM.  Always use your most recent med list.                   Brand Name Dispense Instructions for use Diagnosis    alfuzosin 10 MG 24 hr tablet    UROXATRAL    90 tablet    TAKE 1 TABLET (10 MG) BY MOUTH DAILY AFTER A MEAL    Benign non-nodular prostatic hyperplasia with lower urinary tract symptoms       amLODIPine 2.5 MG tablet    NORVASC    30 tablet    Take 1 tablet (2.5 mg) by mouth daily    Benign essential hypertension       aspirin 81 MG tablet      Take 1 tablet by mouth 2 times daily        budesonide-formoterol 160-4.5 MCG/ACT Inhaler    SYMBICORT    3 Inhaler    Inhale 2 puffs into the lungs 2 times daily    COPD exacerbation (H)       fish oil-omega-3 fatty acids 1000 MG capsule      take one tablet twice daily        HYDROcodone-acetaminophen 5-325 MG per tablet    NORCO    180 tablet    Take 1 tablet by mouth every 4 hours as needed    Spinal stenosis of lumbar region with neurogenic claudication       INCRUSE ELLIPTA IN           levalbuterol 0.31 MG/3ML neb solution    XOPENEX    720 mL    INHALE 1 VIAL (3ML) BY NEBULIZATION EVERY 4 HOURS AS NEEDED FOR WHEEZING OR SHORTNESS OF BREATH.    COPD exacerbation (H)       lidocaine 5 % Patch    LIDODERM    30 patch    Place 1 patch onto the skin every 24 hours    Chronic bilateral low back pain without sciatica       lisinopril 40 MG tablet    PRINIVIL/ZESTRIL    90 tablet    TAKE 1 TABLET (40 MG) BY MOUTH DAILY    Essential hypertension, benign       methimazole 5 MG tablet    TAPAZOLE     Take 5 mg by mouth Take one tablet daily on Monday and Thursday        metoprolol 25 MG 24 hr tablet    TOPROL-XL    90 tablet    TAKE 1 TABLET BY MOUTH DAILY    Essential  hypertension, benign       MULTIVITAMIN PO      Take by mouth daily        nitroGLYcerin 0.4 MG sublingual tablet    NITROSTAT    30 tablet    Place 1 tablet (0.4 mg) under the tongue See Admin Instructions for chest pain    Coronary artery disease involving native coronary artery of native heart without angina pectoris       PROAIR  (90 BASE) MCG/ACT Inhaler   Generic drug:  albuterol     18 g    INHALE 2 PUFFS INTO THE LUNGS EVERY 6 HOURS AS NEEDED FOR SHORTNESS OF BREATH / DYSPNEA OR WHEEZING    COPD exacerbation (H)       roflumilast 500 MCG Tabs tablet    DALIRESP    31 tablet    Take 1 tablet (500 mcg) by mouth daily    Chronic obstructive pulmonary disease, unspecified COPD type (H)       rosuvastatin 10 MG tablet    CRESTOR    90 tablet    Hold for 2 weeks to see if rash improves    Hyperlipidemia LDL goal <100

## 2018-01-14 DIAGNOSIS — J44.1 COPD EXACERBATION (H): ICD-10-CM

## 2018-01-15 NOTE — TELEPHONE ENCOUNTER
INCRUSE ELLIPTA 62.5 MCG/INH Inhaler        Last Written Prescription Date:  ?  Last Fill Quantity: ?,   # refills: ?  Last Office Visit: 11/24/2017  Future Office visit:    Next 5 appointments (look out 90 days)     Feb 26, 2018  9:30 AM CST   [facility]    [address] [facility] [facility] [facility] [address] [phone]            Feb 28, 2018  1:00 PM CST   Return Visit with PREETI Mendoza CNP   [facility] ([facility])    [address]  [facility] [address] [phone]                   Routing refill request to provider for review/approval because:  Medication is reported/historical

## 2018-02-12 ENCOUNTER — THERAPY VISIT (OUTPATIENT)
Dept: SLEEP MEDICINE | Facility: CLINIC | Age: 73
End: 2018-02-12
Payer: COMMERCIAL

## 2018-02-12 DIAGNOSIS — G47.30 SLEEP DISORDER BREATHING: ICD-10-CM

## 2018-02-12 PROCEDURE — 95810 POLYSOM 6/> YRS 4/> PARAM: CPT | Performed by: PSYCHIATRY & NEUROLOGY

## 2018-02-12 NOTE — MR AVS SNAPSHOT
After Visit Summary   2/12/2018    Harrison Thomas    MRN: [id number]           Patient Information     Date Of Birth          1945        Visit Information        Provider Department      2/12/2018 8:30 PM BED 6  SLEEP [facility]        Today's Diagnoses     Sleep disorder breathing           Follow-ups after your visit        Your next 10 appointments already scheduled     Feb 26, 2018  9:30 AM CST   [facility] Visit with Yaneli Dozier MD   [facility]    [address] [id number] [phone]           Bring a current list of meds and any records pertaining to this visit. For Physicals, please bring immunization records and any forms needing to be filled out. Please arrive 10 minutes early to complete paperwork.            Feb 26, 2018 11:00 AM CST   Return Sleep Patient with Jerry Mccabe MD   [facility] ([facility])    [address]  [facility] [id number] [phone]            Feb 28, 2018  1:00 PM CST   Return Visit with PREETI Mendoza [facility] ([facility])    [address]  [facility] [address] [phone]            Jun 14, 2018 10:30 AM CDT   (Arrive by 10:15 AM)   Return Visit with Khoa Handy MD   [facility] ([facility])    [address] [place] [address]  [address]  [facility] [id number] [phone]              Who to contact     If you have questions or need follow up information about today's clinic visit or your schedule please contact [facility] directly at [phone].  Normal or non-critical lab and imaging results will be communicated to you by MyChart, letter or phone within 4 business days after the clinic has received the results. If you do not hear from us within  7 days, please contact the clinic through [employer] or phone. If you have a critical or abnormal lab result, we will notify you by phone as soon as possible.  Submit refill requests through [employer] or call your pharmacy and they will forward the refill request to us. Please allow 3 business days for your refill to be completed.          Additional Information About Your Visit        [employer] Information     [employer] gives you secure access to your electronic health record. If you see a primary care provider, you can also send messages to your care team and make appointments. If you have questions, please call your primary care clinic.  If you do not have a primary care provider, please call [phone] and they will assist you.        Care EveryWhere ID     This is your Care EveryWhere ID. This could be used by other organizations to access your [facility] medical records  [id number]         Blood Pressure from Last 3 Encounters:   01/03/18 (!) 179/95   12/21/17 151/76   12/07/17 152/74    Weight from Last 3 Encounters:   01/03/18 82.3 kg (181 lb 8 oz)   12/21/17 81.6 kg (180 lb)   12/07/17 84 kg (185 lb 3.2 oz)              We Performed the Following     Comprehensive Sleep Study        Primary Care Provider Office Phone # Fax #    Bhavsophie Dozier [phone] [address] [address]        Equal Access to Services     [facility]: Hadii aad ku hadasho Sojose, waaxda luqadaha, qaybta kaalmada daniel, connie silva . So [facility] [phone].    ATENCIÓN: Si habla español, tiene a ann disposición servicios gratuitos de asistencia lingüística. Roddy al [phone].    We comply with applicable federal civil rights laws and [place] laws. We do not discriminate on the basis of race, color, national origin, age, disability, sex, sexual orientation, or gender identity.            Thank you!     Thank you for choosing [facility]  for your  care. Our goal is always to provide you with excellent care. Hearing back from our patients is one way we can continue to improve our services. Please take a few minutes to complete the written survey that you may receive in the mail after your visit with us. Thank you!             Your Updated Medication List - Protect others around you: Learn how to safely use, store and throw away your medicines at www.disposemymeds.org.          This list is accurate as of 2/12/18 11:59 PM.  Always use your most recent med list.                   Brand Name Dispense Instructions for use Diagnosis    alfuzosin 10 MG 24 hr tablet    UROXATRAL    90 tablet    TAKE 1 TABLET (10 MG) BY MOUTH DAILY AFTER A MEAL    Benign non-nodular prostatic hyperplasia with lower urinary tract symptoms       amLODIPine 2.5 MG tablet    NORVASC    30 tablet    Take 1 tablet (2.5 mg) by mouth daily    Benign essential hypertension       aspirin 81 MG tablet      Take 1 tablet by mouth 2 times daily        budesonide-formoterol 160-4.5 MCG/ACT Inhaler    SYMBICORT    3 Inhaler    Inhale 2 puffs into the lungs 2 times daily    COPD exacerbation (H)       fish oil-omega-3 fatty acids 1000 MG capsule      take one tablet twice daily        HYDROcodone-acetaminophen 5-325 MG per tablet    NORCO    180 tablet    Take 1 tablet by mouth every 4 hours as needed    Spinal stenosis of lumbar region with neurogenic claudication       * INCRUSE ELLIPTA IN           * INCRUSE ELLIPTA 62.5 MCG/INH oral inhaler   Generic drug:  umeclidinium     1 Inhaler    INHALE 1 PUFF INTO THE LUNGS DAILY    COPD exacerbation (H)       levalbuterol 0.31 MG/3ML neb solution    XOPENEX    720 mL    INHALE 1 VIAL (3ML) BY NEBULIZATION EVERY 4 HOURS AS NEEDED FOR WHEEZING OR SHORTNESS OF BREATH.    COPD exacerbation (H)       lidocaine 5 % Patch    LIDODERM    30 patch    Place 1 patch onto the skin every 24 hours    Chronic bilateral low back pain without sciatica       lisinopril 40  MG tablet    PRINIVIL/ZESTRIL    90 tablet    TAKE 1 TABLET (40 MG) BY MOUTH DAILY    Essential hypertension, benign       methimazole 5 MG tablet    TAPAZOLE     Take 5 mg by mouth Take one tablet daily on Monday and Thursday        metoprolol succinate 25 MG 24 hr tablet    TOPROL-XL    90 tablet    TAKE 1 TABLET BY MOUTH DAILY    Essential hypertension, benign       MULTIVITAMIN PO      Take by mouth daily        nitroGLYcerin 0.4 MG sublingual tablet    NITROSTAT    30 tablet    Place 1 tablet (0.4 mg) under the tongue See Admin Instructions for chest pain    Coronary artery disease involving native coronary artery of native heart without angina pectoris       PROAIR  (90 BASE) MCG/ACT Inhaler   Generic drug:  albuterol     18 g    INHALE 2 PUFFS INTO THE LUNGS EVERY 6 HOURS AS NEEDED FOR SHORTNESS OF BREATH / DYSPNEA OR WHEEZING    COPD exacerbation (H)       roflumilast 500 MCG Tabs tablet    DALIRESP    31 tablet    Take 1 tablet (500 mcg) by mouth daily    Chronic obstructive pulmonary disease, unspecified COPD type (H)       rosuvastatin 10 MG tablet    CRESTOR    90 tablet    Hold for 2 weeks to see if rash improves    Hyperlipidemia LDL goal <100       * Notice:  This list has 2 medication(s) that are the same as other medications prescribed for you. Read the directions carefully, and ask your doctor or other care provider to review them with you.

## 2018-02-13 NOTE — PROGRESS NOTES
Diagnostic PSG completed per provider order.  Patient met criteria for PAP therapy (>20). Pt refused CPAP titration.

## 2018-02-14 NOTE — PROCEDURES
" SLEEP STUDY INTERPRETATION  DIAGNOSTIC POLYSOMNOGRAPHY REPORT      Patient: Harrison Thomas  YOB: 1945  Study Date: 2/12/2018  MRN: [id number]  Referring Provider: MD Dozier Bhavjot  Ordering Provider: MD Mccabe Michael    Indications for Polysomnography: The patient is a 72 y old Male who is 5' 9\" and weighs 180.0 lbs. His BMI is 26.7, [place] sleepiness scale 12.0 and neck circumference is 43.0 cm. Relevant medical history includes snoring, witness apneas, COPD. A diagnostic polysomnogram was performed to evaluate for sleep apnea/hypoventilation/hypoxemia.    Polysomnogram Data: A full night polysomnogram recorded the standard physiologic parameters including EEG, EOG, EMG, ECG, nasal and oral airflow. Respiratory parameters of chest and abdominal movements were recorded with respiratory inductance plethysmography. Oxygen saturation was recorded by pulse oximetry. Hypopnea scoring rule used: 1B 4%.    Sleep Architecture: Sleep fragmentation  The total recording time of the polysomnogram was 362.3 minutes. The total sleep time was 223.0 minutes. Sleep latency was 9.8 minutes. REM latency was 90.5 minutes. Arousal index was 78.3 arousals per hour. Sleep efficiency was 61.6%. Wake after sleep onset was 78.5 minutes. The patient spent 15.5% of total sleep time in Stage N1, 62.3% in Stage N2, 0.0% in Stage N3, and 22.2% in REM. Time in REM supine was 0 minutes.    Respiration: Moderate sleep disordered breathing best characterized as obstructive with hypoxemia.  Of note the patient did not have REM supine during the study.      Events ? The polysomnogram revealed a presence of 33 obstructive, - central, and - mixed apneas resulting in an apnea index of 8.9 events per hour. There were 41 obstructive hypopneas and - central hypopneas resulting in an obstructive hypopnea index of 11.0 and central hypopnea index of - events per hour. The combined apnea/hypopnea index was 19.9 events per hour (central " apnea/hypopnea index was - events per hour). The REM AHI was 42.4 events per hour. The supine AHI was 30.4 events per hour. The RERA index was 43.0 events per hour.  The RDI was 63.0 events per hour.    Snoring - was reported as mild-moderate.    Respiratory rate and pattern - was notable for normal respiratory rate and pattern.    Sustained Sleep Associated Hypoventilation - Transcutaneous carbon dioxide monitoring was used and hypoventilation was not present.    Sleep Associated Hypoxemia - (Greater than 5 minutes O2 sat at or below 88%) was present. Baseline oxygen saturation was 94.4%. Lowest oxygen saturation was 66.2%. Time spent less than or equal to 88% was 9.5 minutes. Time spent less than or equal to 89% was 11.4 minutes.    Movement Activity: The patient had elevated periodic limb movements (PLMs). The majority of these were not associated with cortical arousal.    Periodic Limb Activity - There were 74 PLMs during the entire study. The PLM index was 19.9 movements per hour. The PLM Arousal Index was 3.2 per hour.    REM EMG Activity - Excessive muscle activity was not present.    Nocturnal Behavior - Abnormal sleep related behaviors were not noted.     Bruxism - None apparent.    Cardiac Summary: Sinus  The average pulse rate was 58.4 bpm. The minimum pulse rate was 50.8 bpm while the maximum pulse rate was 93.0 bpm.      Assessment:     Moderate sleep disordered breathing best characterized as obstructive with hypoxemia.  Of note the patient did not have REM supine during the study.     The patient had elevated periodic limb movements (PLMs). The majority of these were not associated with cortical arousal.     Recommendations:    BRENNEN can be treated with the following options:  o Dental Appliance  o Auto CPAP  o Upper Airway Surgery  o Position restriction device (such as a ZZOMA) to prevent the patient from sleeping supine  o Hypoxemia may very well resolve once obstruction is addressed if it persists  on overnight oximetry could consider supplemental O2 therapy.    Advice regarding the risks of drowsy driving.    Suggest optimizing sleep schedule and avoiding sleep deprivation.    Treatment of PLMs (dopaminergic agents or delta-2 ligands) should be targeted at patients who either have wakeful motor restlessness or those in whom there is a high clinical suspicion of periodic limb movement disorder and not for elevated PLMs alone.    Diagnostic Code(s): G47.33, G47.36      Jerry Mccabe MD 2-14-18

## 2018-02-26 ENCOUNTER — OFFICE VISIT (OUTPATIENT)
Dept: SLEEP MEDICINE | Facility: CLINIC | Age: 73
End: 2018-02-26
Payer: COMMERCIAL

## 2018-02-26 ENCOUNTER — OFFICE VISIT (OUTPATIENT)
Dept: FAMILY MEDICINE | Facility: CLINIC | Age: 73
End: 2018-02-26
Payer: COMMERCIAL

## 2018-02-26 ENCOUNTER — TELEPHONE (OUTPATIENT)
Dept: FAMILY MEDICINE | Facility: CLINIC | Age: 73
End: 2018-02-26

## 2018-02-26 VITALS
HEIGHT: 69 IN | WEIGHT: 188 LBS | DIASTOLIC BLOOD PRESSURE: 75 MMHG | RESPIRATION RATE: 16 BRPM | HEART RATE: 70 BPM | BODY MASS INDEX: 27.85 KG/M2 | SYSTOLIC BLOOD PRESSURE: 135 MMHG | OXYGEN SATURATION: 95 %

## 2018-02-26 VITALS
BODY MASS INDEX: 27.85 KG/M2 | HEART RATE: 59 BPM | OXYGEN SATURATION: 95 % | HEIGHT: 69 IN | WEIGHT: 188 LBS | DIASTOLIC BLOOD PRESSURE: 70 MMHG | SYSTOLIC BLOOD PRESSURE: 126 MMHG | TEMPERATURE: 98.7 F

## 2018-02-26 DIAGNOSIS — N40.1 BENIGN NON-NODULAR PROSTATIC HYPERPLASIA WITH LOWER URINARY TRACT SYMPTOMS: ICD-10-CM

## 2018-02-26 DIAGNOSIS — J44.9 CHRONIC OBSTRUCTIVE PULMONARY DISEASE, UNSPECIFIED COPD TYPE (H): ICD-10-CM

## 2018-02-26 DIAGNOSIS — M48.062 SPINAL STENOSIS OF LUMBAR REGION WITH NEUROGENIC CLAUDICATION: ICD-10-CM

## 2018-02-26 DIAGNOSIS — G47.33 OSA (OBSTRUCTIVE SLEEP APNEA): Primary | ICD-10-CM

## 2018-02-26 DIAGNOSIS — E78.5 HYPERLIPIDEMIA LDL GOAL <100: ICD-10-CM

## 2018-02-26 DIAGNOSIS — D47.2 MONOCLONAL PARAPROTEINEMIA: ICD-10-CM

## 2018-02-26 DIAGNOSIS — J44.1 COPD EXACERBATION (H): ICD-10-CM

## 2018-02-26 DIAGNOSIS — I10 BENIGN ESSENTIAL HYPERTENSION: ICD-10-CM

## 2018-02-26 DIAGNOSIS — G89.4 CHRONIC PAIN SYNDROME: ICD-10-CM

## 2018-02-26 DIAGNOSIS — D84.9 IMMUNODEFICIENCY (H): ICD-10-CM

## 2018-02-26 DIAGNOSIS — Z00.00 ROUTINE GENERAL MEDICAL EXAMINATION AT A HEALTH CARE FACILITY: Primary | ICD-10-CM

## 2018-02-26 DIAGNOSIS — E05.90 THYROTOXICOSIS WITHOUT THYROID STORM, UNSPECIFIED THYROTOXICOSIS TYPE: ICD-10-CM

## 2018-02-26 LAB
ALBUMIN SERPL-MCNC: 3.5 G/DL (ref 3.4–5)
ALP SERPL-CCNC: 88 U/L (ref 40–150)
ALT SERPL W P-5'-P-CCNC: 29 U/L (ref 0–70)
ANION GAP SERPL CALCULATED.3IONS-SCNC: 6 MMOL/L (ref 3–14)
AST SERPL W P-5'-P-CCNC: 19 U/L (ref 0–45)
BILIRUB SERPL-MCNC: 0.4 MG/DL (ref 0.2–1.3)
BUN SERPL-MCNC: 15 MG/DL (ref 7–30)
CALCIUM SERPL-MCNC: 8.7 MG/DL (ref 8.5–10.1)
CHLORIDE SERPL-SCNC: 108 MMOL/L (ref 94–109)
CHOLEST SERPL-MCNC: 157 MG/DL
CO2 SERPL-SCNC: 28 MMOL/L (ref 20–32)
CREAT SERPL-MCNC: 1 MG/DL (ref 0.66–1.25)
ERYTHROCYTE [DISTWIDTH] IN BLOOD BY AUTOMATED COUNT: 13.8 % (ref 10–15)
GFR SERPL CREATININE-BSD FRML MDRD: 74 ML/MIN/1.7M2
GLUCOSE SERPL-MCNC: 89 MG/DL (ref 70–99)
HCT VFR BLD AUTO: 42.9 % (ref 40–53)
HDLC SERPL-MCNC: 51 MG/DL
HGB BLD-MCNC: 14.1 G/DL (ref 13.3–17.7)
LDLC SERPL CALC-MCNC: 92 MG/DL
MCH RBC QN AUTO: 30.4 PG (ref 26.5–33)
MCHC RBC AUTO-ENTMCNC: 32.9 G/DL (ref 31.5–36.5)
MCV RBC AUTO: 93 FL (ref 78–100)
NONHDLC SERPL-MCNC: 106 MG/DL
PLATELET # BLD AUTO: 168 10E9/L (ref 150–450)
POTASSIUM SERPL-SCNC: 4.8 MMOL/L (ref 3.4–5.3)
PROT SERPL-MCNC: 6.6 G/DL (ref 6.8–8.8)
RBC # BLD AUTO: 4.64 10E12/L (ref 4.4–5.9)
SODIUM SERPL-SCNC: 142 MMOL/L (ref 133–144)
TRIGL SERPL-MCNC: 70 MG/DL
TSH SERPL DL<=0.005 MIU/L-ACNC: 0.45 MU/L (ref 0.4–4)
WBC # BLD AUTO: 7.1 10E9/L (ref 4–11)

## 2018-02-26 PROCEDURE — 85027 COMPLETE CBC AUTOMATED: CPT | Performed by: INTERNAL MEDICINE

## 2018-02-26 PROCEDURE — 84165 PROTEIN E-PHORESIS SERUM: CPT | Performed by: INTERNAL MEDICINE

## 2018-02-26 PROCEDURE — 84443 ASSAY THYROID STIM HORMONE: CPT | Performed by: INTERNAL MEDICINE

## 2018-02-26 PROCEDURE — 00000402 ZZHCL STATISTIC TOTAL PROTEIN: Performed by: INTERNAL MEDICINE

## 2018-02-26 PROCEDURE — 80053 COMPREHEN METABOLIC PANEL: CPT | Performed by: INTERNAL MEDICINE

## 2018-02-26 PROCEDURE — 36415 COLL VENOUS BLD VENIPUNCTURE: CPT | Performed by: INTERNAL MEDICINE

## 2018-02-26 PROCEDURE — 80061 LIPID PANEL: CPT | Performed by: INTERNAL MEDICINE

## 2018-02-26 PROCEDURE — 99213 OFFICE O/P EST LOW 20 MIN: CPT | Performed by: PSYCHIATRY & NEUROLOGY

## 2018-02-26 PROCEDURE — G0438 PPPS, INITIAL VISIT: HCPCS | Performed by: INTERNAL MEDICINE

## 2018-02-26 RX ORDER — BUDESONIDE AND FORMOTEROL FUMARATE DIHYDRATE 160; 4.5 UG/1; UG/1
2 AEROSOL RESPIRATORY (INHALATION) 2 TIMES DAILY
Qty: 3 INHALER | Refills: 3 | Status: SHIPPED | OUTPATIENT
Start: 2018-02-26 | End: 2018-02-26

## 2018-02-26 RX ORDER — ROSUVASTATIN CALCIUM 10 MG/1
TABLET, COATED ORAL
Qty: 90 TABLET | Refills: 3 | Status: SHIPPED | OUTPATIENT
Start: 2018-02-26 | End: 2019-06-11

## 2018-02-26 RX ORDER — METOPROLOL SUCCINATE 25 MG/1
25 TABLET, EXTENDED RELEASE ORAL DAILY
Qty: 90 TABLET | Refills: 3 | Status: SHIPPED | OUTPATIENT
Start: 2018-02-26 | End: 2019-02-11

## 2018-02-26 RX ORDER — BUDESONIDE AND FORMOTEROL FUMARATE DIHYDRATE 160; 4.5 UG/1; UG/1
2 AEROSOL RESPIRATORY (INHALATION) 2 TIMES DAILY
Qty: 3 INHALER | Refills: 3 | Status: SHIPPED | OUTPATIENT
Start: 2018-02-26 | End: 2019-02-11

## 2018-02-26 RX ORDER — LISINOPRIL 40 MG/1
TABLET ORAL
Qty: 90 TABLET | Refills: 3 | Status: SHIPPED | OUTPATIENT
Start: 2018-02-26 | End: 2019-02-11

## 2018-02-26 RX ORDER — ROSUVASTATIN CALCIUM 10 MG/1
TABLET, COATED ORAL
Qty: 90 TABLET | Refills: 3 | Status: SHIPPED | OUTPATIENT
Start: 2018-02-26 | End: 2018-03-01

## 2018-02-26 RX ORDER — ALFUZOSIN HYDROCHLORIDE 10 MG/1
TABLET, EXTENDED RELEASE ORAL
Qty: 90 TABLET | Refills: 3 | Status: SHIPPED | OUTPATIENT
Start: 2018-02-26 | End: 2019-02-11

## 2018-02-26 RX ORDER — AMLODIPINE BESYLATE 2.5 MG/1
2.5 TABLET ORAL DAILY
Qty: 30 TABLET | Refills: 11 | Status: SHIPPED | OUTPATIENT
Start: 2018-02-26 | End: 2018-03-01

## 2018-02-26 RX ORDER — HYDROCODONE BITARTRATE AND ACETAMINOPHEN 5; 325 MG/1; MG/1
1 TABLET ORAL EVERY 4 HOURS PRN
Qty: 180 TABLET | Refills: 0 | Status: SHIPPED | OUTPATIENT
Start: 2018-02-26 | End: 2018-05-07

## 2018-02-26 ASSESSMENT — ANXIETY QUESTIONNAIRES
5. BEING SO RESTLESS THAT IT IS HARD TO SIT STILL: MORE THAN HALF THE DAYS
3. WORRYING TOO MUCH ABOUT DIFFERENT THINGS: NOT AT ALL
6. BECOMING EASILY ANNOYED OR IRRITABLE: SEVERAL DAYS
GAD7 TOTAL SCORE: 4
IF YOU CHECKED OFF ANY PROBLEMS ON THIS QUESTIONNAIRE, HOW DIFFICULT HAVE THESE PROBLEMS MADE IT FOR YOU TO DO YOUR WORK, TAKE CARE OF THINGS AT HOME, OR GET ALONG WITH OTHER PEOPLE: NOT DIFFICULT AT ALL
1. FEELING NERVOUS, ANXIOUS, OR ON EDGE: NOT AT ALL
2. NOT BEING ABLE TO STOP OR CONTROL WORRYING: NOT AT ALL
7. FEELING AFRAID AS IF SOMETHING AWFUL MIGHT HAPPEN: NOT AT ALL

## 2018-02-26 ASSESSMENT — PATIENT HEALTH QUESTIONNAIRE - PHQ9: 5. POOR APPETITE OR OVEREATING: SEVERAL DAYS

## 2018-02-26 NOTE — PATIENT INSTRUCTIONS

## 2018-02-26 NOTE — MR AVS SNAPSHOT
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   o   Discuss preventive care.    o   Review your medicines if your doctor has prescribed any.    Talk with your health care provider about whether you should have a test to screen for prostate cancer (PSA).    Every 3 years, have a diabetes test (fasting glucose). If you are at risk for diabetes, you should have this test more often.    Every 5 years, have a cholesterol test. Have this test more often if you are at risk for high cholesterol or heart disease.     Every 10 years, have a colonoscopy. Or, have a yearly FIT test (stool test). These exams will check for colon cancer.    Talk to with your health care provider about screening for Abdominal Aortic Aneurysm if you have a family history of AAA or have a history of smoking.  Shots:     Get a flu shot each year.     Get a tetanus shot every 10 years.     Talk to your doctor about your pneumonia vaccines. There are now  two you should receive - Pneumovax (PPSV 23) and Prevnar (PCV 13).    Talk to your doctor about a shingles vaccine.     Talk to your doctor about the hepatitis B vaccine.  Nutrition:     Eat at least 5 servings of fruits and vegetables each day.     Eat whole-grain bread, whole-wheat pasta and brown rice instead of white grains and rice.     Talk to your doctor about Calcium and Vitamin D.   Lifestyle    Exercise for at least 150 minutes a week (30 minutes a day, 5 days a week). This will help you control your weight and prevent disease.     Limit alcohol to one drink per day.     No smoking.     Wear sunscreen to prevent skin cancer.     See your dentist every six months for an exam and cleaning.     See your eye doctor every 1 to 2 years to screen for conditions such as glaucoma, macular degeneration and cataracts.          Follow-ups after your visit        Your next 10 appointments already scheduled     Feb 28, 2018  1:00 PM CST   Return Visit with PREETI Mendoza [facility] ([facility])    [address]  [facility] [address] [phone]            Mar 01, 2018 10:30 AM CST   PAP SETUP with  SLEEP CENTER [facility] ([facility])    [address]  [facility] [address] [phone]            Jun 14, 2018 10:30 AM CDT   (Arrive by 10:15 AM)   Return Visit with Khoa Handy MD   [facility] ([facility])    [address]  [address] [place] [id number] [phone]            Aug 27, 2018  9:30 AM CDT   Office Visit with Yaneli Dozier MD   [facility] ([facility])    [address] [address] [phone]           Bring a current list of meds and any records pertaining to this visit. For Physicals, please bring immunization records and any  "forms needing to be filled out. Please arrive 10 minutes early to complete paperwork.              Who to contact     If you have questions or need follow up information about today's clinic visit or your schedule please contact [facility] directly at [phone].  Normal or non-critical lab and imaging results will be communicated to you by MyChart, letter or phone within 4 business days after the clinic has received the results. If you do not hear from us within 7 days, please contact the clinic through [facility] or phone. If you have a critical or abnormal lab result, we will notify you by phone as soon as possible.  Submit refill requests through [facility] or call your pharmacy and they will forward the refill request to us. Please allow 3 business days for your refill to be completed.          Additional Information About Your Visit        [facility]you Information     [facility] gives you secure access to your electronic health record. If you see a primary care provider, you can also send messages to your care team and make appointments. If you have questions, please call your primary care clinic.  If you do not have a primary care provider, please call [phone] and they will assist you.        Care EveryWhere ID     This is your Care EveryWhere ID. This could be used by other organizations to access your [place] medical records  [id number]        Your Vitals Were     Pulse Temperature Height Pulse Oximetry BMI (Body Mass Index)       59 98.7  F (37.1  C) (Oral) 5' 9\" (1.753 m) 95% 27.76 kg/m2        Blood Pressure from Last 3 Encounters:   02/26/18 135/75   02/26/18 126/70   01/03/18 (!) 179/95    Weight from Last 3 Encounters:   02/26/18 188 lb (85.3 kg)   02/26/18 188 lb (85.3 kg)   01/03/18 181 lb 8 oz (82.3 kg)              We Performed the Following     CBC with platelets     Comprehensive metabolic panel     Lipid panel reflex to direct LDL Fasting     Protein electrophoresis     TSH with free T4 " reflex          Today's Medication Changes          These changes are accurate as of 2/26/18  2:09 PM.  If you have any questions, ask your nurse or doctor.               Start taking these medicines.        Dose/Directions    budesonide-formoterol 160-4.5 MCG/ACT Inhaler   Commonly known as:  SYMBICORT   Used for:  COPD exacerbation (H)   Started by:  Yaneli Dozier MD        Dose:  2 puff   Inhale 2 puffs into the lungs 2 times daily   Quantity:  3 Inhaler   Refills:  3         These medicines have changed or have updated prescriptions.        Dose/Directions    alfuzosin 10 MG 24 hr tablet   Commonly known as:  UROXATRAL   This may have changed:  See the new instructions.   Used for:  Benign non-nodular prostatic hyperplasia with lower urinary tract symptoms   Changed by:  Yaneli Dozier MD        TAKE 1 TABLET (10 MG) BY MOUTH DAILY AFTER A MEAL   Quantity:  90 tablet   Refills:  3       INCRUSE ELLIPTA 62.5 MCG/INH oral inhaler   This may have changed:  Another medication with the same name was removed. Continue taking this medication, and follow the directions you see here.   Used for:  COPD exacerbation (H)   Generic drug:  umeclidinium   Changed by:  Yaneli Dozier MD        INHALE 1 PUFF INTO THE LUNGS DAILY   Quantity:  1 Inhaler   Refills:  3       lisinopril 40 MG tablet   Commonly known as:  PRINIVIL/ZESTRIL   This may have changed:  See the new instructions.   Used for:  Benign essential hypertension   Changed by:  Yaneli Dozier MD        TAKE 1 TABLET (40 MG) BY MOUTH DAILY   Quantity:  90 tablet   Refills:  3       metoprolol succinate 25 MG 24 hr tablet   Commonly known as:  TOPROL-XL   This may have changed:  See the new instructions.   Used for:  Benign essential hypertension   Changed by:  Yaneli Dozier MD        Dose:  25 mg   Take 1 tablet (25 mg) by mouth daily   Quantity:  90 tablet   Refills:  3       * rosuvastatin 10 MG tablet   Commonly known as:  CRESTOR   This may have changed:   Another medication with the same name was added. Make sure you understand how and when to take each.   Used for:  Hyperlipidemia LDL goal <100   Changed by:  Yaneli Dozier MD        Hold for 2 weeks to see if rash improves   Quantity:  90 tablet   Refills:  3       * rosuvastatin 10 MG tablet   Commonly known as:  CRESTOR   This may have changed:  You were already taking a medication with the same name, and this prescription was added. Make sure you understand how and when to take each.   Used for:  Hyperlipidemia LDL goal <100   Changed by:  Yaneli Dozier MD        TAKE 1 TABLET (10 MG) BY MOUTH DAILY   Quantity:  90 tablet   Refills:  3       * Notice:  This list has 2 medication(s) that are the same as other medications prescribed for you. Read the directions carefully, and ask your doctor or other care provider to review them with you.         Where to get your medicines      These medications were sent to [facility]/pharmacy #[id number] [facility] [address] [place] [address]     Phone:  [phone]     amLODIPine 2.5 MG tablet    budesonide-formoterol 160-4.5 MCG/ACT Inhaler    lisinopril 40 MG tablet    metoprolol succinate 25 MG 24 hr tablet    rosuvastatin 10 MG tablet    rosuvastatin 10 MG tablet         These medications were sent to [place], [place] - [address]  [address], [facility] [address], [facility] [address]     Phone:  [phone]     alfuzosin 10 MG 24 hr tablet         Some of these will need a paper prescription and others can be bought over the counter.  Ask your nurse if you have questions.     Bring a paper prescription for each of these medications     HYDROcodone-acetaminophen 5-325 MG per tablet                Primary Care Provider Office Phone # Fax #    Yaneli Dozier [phone] [address]  [facility] [address]        Equal Access to Services     SHYAM RIVERA AH: Mimi live  muriel Marcus, ingridda luleeannadaha, qatoneta kavanda sanchez, connie stover juanraúl agustinumm lamarjoriecj compa. So [facility] [phone].    ATENCIÓN: Si habla reyes, tiene a ann disposición servicios gratuitos de asistencia lingüística. Roddy al [phone].    We comply with applicable federal civil rights laws and [place] laws. We do not discriminate on the basis of race, color, national origin, age, disability, sex, sexual orientation, or gender identity.            Thank you!     Thank you for choosing [facility]  for your care. Our goal is always to provide you with excellent care. Hearing back from our patients is one way we can continue to improve our services. Please take a few minutes to complete the written survey that you may receive in the mail after your visit with us. Thank you!             Your Updated Medication List - Protect others around you: Learn how to safely use, store and throw away your medicines at www.disposemymeds.org.          This list is accurate as of 2/26/18  2:09 PM.  Always use your most recent med list.                   Brand Name Dispense Instructions for use Diagnosis    alfuzosin 10 MG 24 hr tablet    UROXATRAL    90 tablet    TAKE 1 TABLET (10 MG) BY MOUTH DAILY AFTER A MEAL    Benign non-nodular prostatic hyperplasia with lower urinary tract symptoms       amLODIPine 2.5 MG tablet    NORVASC    30 tablet    Take 1 tablet (2.5 mg) by mouth daily    Benign essential hypertension       aspirin 81 MG tablet      Take 1 tablet by mouth 2 times daily        budesonide-formoterol 160-4.5 MCG/ACT Inhaler    SYMBICORT    3 Inhaler    Inhale 2 puffs into the lungs 2 times daily    COPD exacerbation (H)       fish oil-omega-3 fatty acids 1000 MG capsule      take one tablet twice daily        HYDROcodone-acetaminophen 5-325 MG per tablet    NORCO    180 tablet    Take 1 tablet by mouth every 4 hours as needed    Spinal stenosis of lumbar region with neurogenic claudication        INCRUSE ELLIPTA 62.5 MCG/INH oral inhaler   Generic drug:  umeclidinium     1 Inhaler    INHALE 1 PUFF INTO THE LUNGS DAILY    COPD exacerbation (H)       levalbuterol 0.31 MG/3ML neb solution    XOPENEX    720 mL    INHALE 1 VIAL (3ML) BY NEBULIZATION EVERY 4 HOURS AS NEEDED FOR WHEEZING OR SHORTNESS OF BREATH.    COPD exacerbation (H)       lidocaine 5 % Patch    LIDODERM    30 patch    Place 1 patch onto the skin every 24 hours    Chronic bilateral low back pain without sciatica       lisinopril 40 MG tablet    PRINIVIL/ZESTRIL    90 tablet    TAKE 1 TABLET (40 MG) BY MOUTH DAILY    Benign essential hypertension       methimazole 5 MG tablet    TAPAZOLE     Take 5 mg by mouth Take one tablet daily on Monday and Thursday        metoprolol succinate 25 MG 24 hr tablet    TOPROL-XL    90 tablet    Take 1 tablet (25 mg) by mouth daily    Benign essential hypertension       MULTIVITAMIN PO      Take by mouth daily        nitroGLYcerin 0.4 MG sublingual tablet    NITROSTAT    30 tablet    Place 1 tablet (0.4 mg) under the tongue See Admin Instructions for chest pain    Coronary artery disease involving native coronary artery of native heart without angina pectoris       PROAIR  (90 BASE) MCG/ACT Inhaler   Generic drug:  albuterol     18 g    INHALE 2 PUFFS INTO THE LUNGS EVERY 6 HOURS AS NEEDED FOR SHORTNESS OF BREATH / DYSPNEA OR WHEEZING    COPD exacerbation (H)       roflumilast 500 MCG Tabs tablet    DALIRESP    31 tablet    Take 1 tablet (500 mcg) by mouth daily    Chronic obstructive pulmonary disease, unspecified COPD type (H)       * rosuvastatin 10 MG tablet    CRESTOR    90 tablet    Hold for 2 weeks to see if rash improves    Hyperlipidemia LDL goal <100       * rosuvastatin 10 MG tablet    CRESTOR    90 tablet    TAKE 1 TABLET (10 MG) BY MOUTH DAILY    Hyperlipidemia LDL goal <100       * Notice:  This list has 2 medication(s) that are the same as other medications prescribed for you. Read the  directions carefully, and ask your doctor or other care provider to review them with you.

## 2018-02-26 NOTE — TELEPHONE ENCOUNTER
"Pt historically on 10mg, 1 tab daily of Rosuvastatin  Current directions state to hold for 2 weeks to see if rash improves  Restarted at today's OV but directions still state \"hold for 2 weeks to see if rash improves\"     PCP - do you want patient back on prior dose of 10mg daily?     Rx and pharmacy is pended with old directions     Zaria HAYS RN    "
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
## 2018-02-26 NOTE — PROGRESS NOTES
"  SUBJECTIVE:   Harrison Thomas is a 72 year old male who presents to clinic today for the following health issues:      Hyperlipidemia Follow-Up      Rate your low fat/cholesterol diet?: { :108590::\"good\"}    Taking statin?  { :803048::\"No\"}    Other lipid medications/supplements?:  { :444620::\"none\"}    Hypertension Follow-up      Outpatient blood pressures {ISCHECKIN}    Low Salt Diet: { :610970::\"no added salt\"}    COPD Follow-Up    Symptoms are currently: { :335390::\"stable\"}    Current fatigue or dyspnea with ambulation: { :691780::\"none\"}    Shortness of breath: { :091934::\"stable\"}    Increased or change in Cough/Sputum: {NO/YES :420493::\"No\"}    Fever(s): {NO/YES :972913::\"No\"}    Baseline ambulation without stopping to rest:  *** {COPDACTIVITY:283931}. Able to walk up *** flights of stairs without stopping to rest.    Any ER/UC or hospital admissions since your last visit? {NO/YES :373068::\"No\"}     History   Smoking Status     Former Smoker     Packs/day: 1.50     Years: 30.00     Types: Cigarettes     Quit date: 1999   Smokeless Tobacco     Never Used     Comment: 60 PACK YEARS, quit      No results found for: FEV1, [id number]    Chronic Pain Follow-Up       PHQ-9 SCORE 2015   Total Score 10 9 8     PAVAN-7 SCORE 2015   Total Score 7 6     Encounter-Level CSA - 2017:          Controlled Substance Agreement - Scan on 2017  3:48 PM : CONTROLLED SUBSTANCE AGREEMENT (below)            Encounter-Level CSA - 2017:          Controlled Substance Agreement - Scan on 2016  7:45 AM : CONTROLLED SUBSTANCE AGREEMENT (below)                {additional problems for provider to add:052772}    Problem list and histories reviewed & adjusted, as indicated.  Additional history: {NONE - AS DOCUMENTED:387317::\"as documented\"}    {HIST REVIEW/ LINKS 2:441332}    Reviewed and updated as needed this visit by clinical staff       Reviewed and updated " as needed this visit by Provider         {PROVIDER CHARTING PREFERENCE:791501}

## 2018-02-26 NOTE — NURSING NOTE
"Chief Complaint   Patient presents with     Study Results     Follow up jonnie       Initial /75  Pulse 70  Resp 16  Ht 1.753 m (5' 9\")  Wt 85.3 kg (188 lb)  SpO2 95%  BMI 27.76 kg/m2 Estimated body mass index is 27.76 kg/(m^2) as calculated from the following:    Height as of this encounter: 1.753 m (5' 9\").    Weight as of this encounter: 85.3 kg (188 lb).  Medication Reconciliation: complete   ESS 5  Alina Daley MA      "

## 2018-02-26 NOTE — NURSING NOTE
Biometric Screening form filled, Dr. Dozier signed, I faxed to Johnathan.  Orig sent to scanning and a copy in accordion.  Kelsey LOCKHART MA

## 2018-02-26 NOTE — NURSING NOTE
"Chief Complaint   Patient presents with     Wellness Visit       Initial /70 (BP Location: Left arm, Cuff Size: Adult Regular)  Pulse 59  Temp 98.7  F (37.1  C) (Oral)  Ht 5' 9\" (1.753 m)  Wt 188 lb (85.3 kg)  SpO2 95%  BMI 27.76 kg/m2 Estimated body mass index is 27.76 kg/(m^2) as calculated from the following:    Height as of this encounter: 5' 9\" (1.753 m).    Weight as of this encounter: 188 lb (85.3 kg).  Medication Reconciliation: complete     Izabella Gipson CMA      "

## 2018-02-26 NOTE — TELEPHONE ENCOUNTER
Reason for Call:  Other prescription    Detailed comments: Pt's pharmacy called this morning and they are needing directions for the pt's most recent refill on his Rosuvastatin. Please give them a call with directions. The most recent order went over to them with nothing noted. Thank you.    Phone Number Patient can be reached at: Other phone number:  [facility]-- [phone]    Best Time:     Can we leave a detailed message on this number? YES    Call taken on 2/26/2018 at 10:10 AM by Ling Douglas

## 2018-02-26 NOTE — MR AVS SNAPSHOT
After Visit Summary   2/26/2018    Harrison Thomas    MRN: [id number]           Patient Information     Date Of Birth          1945        Visit Information        Provider Department      2/26/2018 11:00 AM Jerry Mccabe MD [facility]        Today's Diagnoses     BRENNEN (obstructive sleep apnea)    -  1      Care Instructions      Your BMI is Body mass index is 27.76 kg/(m^2).  Weight management is a personal decision.  If you are interested in exploring weight loss strategies, the following discussion covers the approaches that may be successful. Body mass index (BMI) is one way to tell whether you are at a healthy weight, overweight, or obese. It measures your weight in relation to your height.  A BMI of 18.5 to 24.9 is in the healthy range. A person with a BMI of 25 to 29.9 is considered overweight, and someone with a BMI of 30 or greater is considered obese. More than two-thirds of American adults are considered overweight or obese.  Being overweight or obese increases the risk for further weight gain. Excess weight may lead to heart disease and diabetes.  Creating and following plans for healthy eating and physical activity may help you improve your health.  Weight control is part of healthy lifestyle and includes exercise, emotional health, and healthy eating habits. Careful eating habits lifelong are the mainstay of weight control. Though there are significant health benefits from weight loss, long-term weight loss with diet alone may be very difficult to achieve- studies show long-term success with dietary management in less than 10% of people. Attaining a healthy weight may be especially difficult to achieve in those with severe obesity. In some cases, medications, devices and surgical management might be considered.  What can you do?  If you are overweight or obese and are interested in methods for weight loss, you should discuss this with your provider.      Consider reducing daily calorie intake by 500 calories.     Keep a food journal.     Avoiding skipping meals, consider cutting portions instead.    Diet combined with exercise helps maintain muscle while optimizing fat loss. Strength training is particularly important for building and maintaining muscle mass. Exercise helps reduce stress, increase energy, and improves fitness. Increasing exercise without diet control, however, may not burn enough calories to loose weight.       Start walking three days a week 10-20 minutes at a time    Work towards walking thirty minutes five days a week     Eventually, increase the speed of your walking for 1-2 minutes at time    In addition, we recommend that you review healthy lifestyles and methods for weight loss available through the National Institutes of Health patient information sites:  http://win.niddk.nih.gov/publications/index.htm    And look into health and wellness programs that may be available through your health insurance provider, employer, local community center, or katie club.    Weight management plan: Patient was referred to their PCP to discuss a diet and exercise plan.              Follow-ups after your visit        Your next 10 appointments already scheduled     Feb 28, 2018  1:00 PM CST   Return Visit with PREETI Mendoza [facility] ([facility])    [address] [place], [place] [id number]  [facility] [address]   [phone]            Jun 14, 2018 10:30 AM CDT   (Arrive by 10:15 AM)   Return Visit with Khoa Handy MD   [facility] ([facility])    [address] [facility]  [address] [place] [id number] [phone]            Aug 27, 2018  9:30 AM CDT   Office Visit with Yaneli Dozier MD   [facility]    [address] [place] [id number] [phone]           Bring a current list  "of meds and any records pertaining to this visit. For Physicals, please bring immunization records and any forms needing to be filled out. Please arrive 10 minutes early to complete paperwork.              Who to contact     If you have questions or need follow up information about today's clinic visit or your schedule please contact [facility] directly at [phone].  Normal or non-critical lab and imaging results will be communicated to you by MyChart, letter or phone within 4 business days after the clinic has received the results. If you do not hear from us within 7 days, please contact the clinic through [employer] or phone. If you have a critical or abnormal lab result, we will notify you by phone as soon as possible.  Submit refill requests through [employer] or call your pharmacy and they will forward the refill request to us. Please allow 3 business days for your refill to be completed.          Additional Information About Your Visit        Contract [employer] Information     [employer] gives you secure access to your electronic health record. If you see a primary care provider, you can also send messages to your care team and make appointments. If you have questions, please call your primary care clinic.  If you do not have a primary care provider, please call [phone] and they will assist you.        Care EveryWhere ID     This is your Care EveryWhere ID. This could be used by other organizations to access your [place] medical records  [id number]        Your Vitals Were     Pulse Respirations Height Pulse Oximetry BMI (Body Mass Index)       70 16 1.753 m (5' 9\") 95% 27.76 kg/m2        Blood Pressure from Last 3 Encounters:   02/26/18 135/75   02/26/18 126/70   01/03/18 (!) 179/95    Weight from Last 3 Encounters:   02/26/18 85.3 kg (188 lb)   02/26/18 85.3 kg (188 lb)   01/03/18 82.3 kg (181 lb 8 oz)              We Performed the Following     Comprehensive DME          Today's Medication Changes "          These changes are accurate as of 2/26/18 11:24 AM.  If you have any questions, ask your nurse or doctor.               Start taking these medicines.        Dose/Directions    budesonide-formoterol 160-4.5 MCG/ACT Inhaler   Commonly known as:  SYMBICORT   Used for:  COPD exacerbation (H)   Started by:  Yaneli Dozier MD        Dose:  2 puff   Inhale 2 puffs into the lungs 2 times daily   Quantity:  3 Inhaler   Refills:  3         These medicines have changed or have updated prescriptions.        Dose/Directions    alfuzosin 10 MG 24 hr tablet   Commonly known as:  UROXATRAL   This may have changed:  See the new instructions.   Used for:  Benign non-nodular prostatic hyperplasia with lower urinary tract symptoms   Changed by:  Yaneli Dozier MD        TAKE 1 TABLET (10 MG) BY MOUTH DAILY AFTER A MEAL   Quantity:  90 tablet   Refills:  3       INCRUSE ELLIPTA 62.5 MCG/INH oral inhaler   This may have changed:  Another medication with the same name was removed. Continue taking this medication, and follow the directions you see here.   Used for:  COPD exacerbation (H)   Generic drug:  umeclidinium   Changed by:  Yaneli Dozier MD        INHALE 1 PUFF INTO THE LUNGS DAILY   Quantity:  1 Inhaler   Refills:  3       lisinopril 40 MG tablet   Commonly known as:  PRINIVIL/ZESTRIL   This may have changed:  See the new instructions.   Used for:  Benign essential hypertension   Changed by:  Yaneli Dozier MD        TAKE 1 TABLET (40 MG) BY MOUTH DAILY   Quantity:  90 tablet   Refills:  3       metoprolol succinate 25 MG 24 hr tablet   Commonly known as:  TOPROL-XL   This may have changed:  See the new instructions.   Used for:  Benign essential hypertension   Changed by:  Yaneli Dozier MD        Dose:  25 mg   Take 1 tablet (25 mg) by mouth daily   Quantity:  90 tablet   Refills:  3       * rosuvastatin 10 MG tablet   Commonly known as:  CRESTOR   This may have changed:  Another medication with the same name was added.  Make sure you understand how and when to take each.   Used for:  Hyperlipidemia LDL goal <100   Changed by:  Yaneli Dozier MD        Hold for 2 weeks to see if rash improves   Quantity:  90 tablet   Refills:  3       * rosuvastatin 10 MG tablet   Commonly known as:  CRESTOR   This may have changed:  You were already taking a medication with the same name, and this prescription was added. Make sure you understand how and when to take each.   Used for:  Hyperlipidemia LDL goal <100   Changed by:  Yaneli Dozier MD        TAKE 1 TABLET (10 MG) BY MOUTH DAILY   Quantity:  90 tablet   Refills:  3       * Notice:  This list has 2 medication(s) that are the same as other medications prescribed for you. Read the directions carefully, and ask your doctor or other care provider to review them with you.         Where to get your medicines      These medications were sent to [facility]/pharmacy #[id number] [facility] [address] [place] [address]     Phone:  [phone]     amLODIPine 2.5 MG tablet    budesonide-formoterol 160-4.5 MCG/ACT Inhaler    lisinopril 40 MG tablet    metoprolol succinate 25 MG 24 hr tablet    rosuvastatin 10 MG tablet    rosuvastatin 10 MG tablet         These medications were sent to [facility] [address], [facility] [address]  [address], [facility] [address], [facility] [address]     Phone:  [phone]     alfuzosin 10 MG 24 hr tablet         Some of these will need a paper prescription and others can be bought over the counter.  Ask your nurse if you have questions.     Bring a paper prescription for each of these medications     HYDROcodone-acetaminophen 5-325 MG per tablet                Primary Care Provider Office Phone # Fax #    Yaneli Dozier [phone] [address]  [facility] [address]        Equal Access to Services     SHYAM RIVERA AH: Mimi Marcus, marianna diallo, tiff duncan  connie sanchezraúl agustinumm zazuetaaan ah. So [facility] [phone].    ATENCIÓN: Si habla reyes, tiene a ann disposición servicios gratuitos de asistencia lingüística. Roddy al [phone].    We comply with applicable federal civil rights laws and [place] laws. We do not discriminate on the basis of race, color, national origin, age, disability, sex, sexual orientation, or gender identity.            Thank you!     Thank you for choosing [facility]  for your care. Our goal is always to provide you with excellent care. Hearing back from our patients is one way we can continue to improve our services. Please take a few minutes to complete the written survey that you may receive in the mail after your visit with us. Thank you!             Your Updated Medication List - Protect others around you: Learn how to safely use, store and throw away your medicines at www.disposemymeds.org.          This list is accurate as of 2/26/18 11:24 AM.  Always use your most recent med list.                   Brand Name Dispense Instructions for use Diagnosis    alfuzosin 10 MG 24 hr tablet    UROXATRAL    90 tablet    TAKE 1 TABLET (10 MG) BY MOUTH DAILY AFTER A MEAL    Benign non-nodular prostatic hyperplasia with lower urinary tract symptoms       amLODIPine 2.5 MG tablet    NORVASC    30 tablet    Take 1 tablet (2.5 mg) by mouth daily    Benign essential hypertension       aspirin 81 MG tablet      Take 1 tablet by mouth 2 times daily        budesonide-formoterol 160-4.5 MCG/ACT Inhaler    SYMBICORT    3 Inhaler    Inhale 2 puffs into the lungs 2 times daily    COPD exacerbation (H)       fish oil-omega-3 fatty acids 1000 MG capsule      take one tablet twice daily        HYDROcodone-acetaminophen 5-325 MG per tablet    NORCO    180 tablet    Take 1 tablet by mouth every 4 hours as needed    Spinal stenosis of lumbar region with neurogenic claudication       INCRUSE ELLIPTA 62.5 MCG/INH oral inhaler    Generic drug:  umeclidinium     1 Inhaler    INHALE 1 PUFF INTO THE LUNGS DAILY    COPD exacerbation (H)       levalbuterol 0.31 MG/3ML neb solution    XOPENEX    720 mL    INHALE 1 VIAL (3ML) BY NEBULIZATION EVERY 4 HOURS AS NEEDED FOR WHEEZING OR SHORTNESS OF BREATH.    COPD exacerbation (H)       lidocaine 5 % Patch    LIDODERM    30 patch    Place 1 patch onto the skin every 24 hours    Chronic bilateral low back pain without sciatica       lisinopril 40 MG tablet    PRINIVIL/ZESTRIL    90 tablet    TAKE 1 TABLET (40 MG) BY MOUTH DAILY    Benign essential hypertension       methimazole 5 MG tablet    TAPAZOLE     Take 5 mg by mouth Take one tablet daily on Monday and Thursday        metoprolol succinate 25 MG 24 hr tablet    TOPROL-XL    90 tablet    Take 1 tablet (25 mg) by mouth daily    Benign essential hypertension       MULTIVITAMIN PO      Take by mouth daily        nitroGLYcerin 0.4 MG sublingual tablet    NITROSTAT    30 tablet    Place 1 tablet (0.4 mg) under the tongue See Admin Instructions for chest pain    Coronary artery disease involving native coronary artery of native heart without angina pectoris       PROAIR  (90 BASE) MCG/ACT Inhaler   Generic drug:  albuterol     18 g    INHALE 2 PUFFS INTO THE LUNGS EVERY 6 HOURS AS NEEDED FOR SHORTNESS OF BREATH / DYSPNEA OR WHEEZING    COPD exacerbation (H)       roflumilast 500 MCG Tabs tablet    DALIRESP    31 tablet    Take 1 tablet (500 mcg) by mouth daily    Chronic obstructive pulmonary disease, unspecified COPD type (H)       * rosuvastatin 10 MG tablet    CRESTOR    90 tablet    Hold for 2 weeks to see if rash improves    Hyperlipidemia LDL goal <100       * rosuvastatin 10 MG tablet    CRESTOR    90 tablet    TAKE 1 TABLET (10 MG) BY MOUTH DAILY    Hyperlipidemia LDL goal <100       * Notice:  This list has 2 medication(s) that are the same as other medications prescribed for you. Read the directions carefully, and ask your doctor or  other care provider to review them with you.

## 2018-02-26 NOTE — PROGRESS NOTES
Visit Date:   2018      Mr. Epstein is 72 years old.  He has a history of symptoms concerning for sleep disordered breathing.  This is complicated by COPD related to a past history of smoking.  We arranged for him to have an overnight polysomnogram, which did indeed demonstrate moderate obstructive sleep apnea with hypoxemia.  Of note, he did not have hypoventilation.  We discussed our options at this point including CPAP, dental appliance or upper airway surgery.  We will go ahead and try autotitration CPAP first.  He is suspicious that he may struggle with it, however, and if he does, we will try a dental appliance.  Whatever his treatment is, considering his rather profound oxygen desaturations, will check an overnight oximetry once treatment is established.      15 minutes were spent with the patient today, greater than 50% of the time in counseling and coordination of care.      The patient is insightful, has been advised not to operate a motor vehicle if he is tired or sleepy, and he indicates that he understands.         LAVERNE SANCHEZ MD             D: 2018   T: 2018   MT: MAMTA      Name:     ABDIRASHID EPSTEIN   MRN:      [id number]        Account:      [id number]   :      1945           Visit Date:   2018      Document: [id number]

## 2018-02-27 DIAGNOSIS — E78.5 HYPERLIPIDEMIA LDL GOAL <100: ICD-10-CM

## 2018-02-27 DIAGNOSIS — I10 ESSENTIAL HYPERTENSION, BENIGN: ICD-10-CM

## 2018-02-27 LAB
ALBUMIN SERPL ELPH-MCNC: 3.9 G/DL (ref 3.7–5.1)
ALPHA1 GLOB SERPL ELPH-MCNC: 0.3 G/DL (ref 0.2–0.4)
ALPHA2 GLOB SERPL ELPH-MCNC: 0.7 G/DL (ref 0.5–0.9)
B-GLOBULIN SERPL ELPH-MCNC: 0.8 G/DL (ref 0.6–1)
GAMMA GLOB SERPL ELPH-MCNC: 0.9 G/DL (ref 0.7–1.6)
M PROTEIN SERPL ELPH-MCNC: 0.1 G/DL
PROT PATTERN SERPL ELPH-IMP: ABNORMAL

## 2018-02-27 ASSESSMENT — PATIENT HEALTH QUESTIONNAIRE - PHQ9: SUM OF ALL RESPONSES TO PHQ QUESTIONS 1-9: 5

## 2018-02-27 ASSESSMENT — ANXIETY QUESTIONNAIRES: GAD7 TOTAL SCORE: 4

## 2018-02-27 NOTE — TELEPHONE ENCOUNTER
Both Rosuvastatin and Lisonopril were escribed yesterday along with other meds to the [facility] on [address]

## 2018-02-28 ENCOUNTER — OFFICE VISIT (OUTPATIENT)
Dept: CARDIOLOGY | Facility: CLINIC | Age: 73
End: 2018-02-28
Attending: INTERNAL MEDICINE
Payer: COMMERCIAL

## 2018-02-28 VITALS
DIASTOLIC BLOOD PRESSURE: 68 MMHG | BODY MASS INDEX: 27.99 KG/M2 | SYSTOLIC BLOOD PRESSURE: 129 MMHG | HEIGHT: 69 IN | WEIGHT: 189 LBS | HEART RATE: 64 BPM

## 2018-02-28 DIAGNOSIS — I10 BENIGN ESSENTIAL HYPERTENSION: ICD-10-CM

## 2018-02-28 DIAGNOSIS — E78.5 HYPERLIPIDEMIA LDL GOAL <100: ICD-10-CM

## 2018-02-28 DIAGNOSIS — I25.10 CORONARY ARTERY DISEASE INVOLVING NATIVE CORONARY ARTERY OF NATIVE HEART WITHOUT ANGINA PECTORIS: Primary | ICD-10-CM

## 2018-02-28 PROCEDURE — 99214 OFFICE O/P EST MOD 30 MIN: CPT | Performed by: NURSE PRACTITIONER

## 2018-02-28 RX ORDER — LISINOPRIL 40 MG/1
TABLET ORAL
Qty: 90 TABLET | Refills: 1 | OUTPATIENT
Start: 2018-02-28

## 2018-02-28 RX ORDER — ROSUVASTATIN CALCIUM 10 MG/1
TABLET, COATED ORAL
Qty: 90 TABLET | Refills: 1 | OUTPATIENT
Start: 2018-02-28
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
## 2018-02-28 NOTE — MR AVS SNAPSHOT
After Visit Summary   2/28/2018    Harrison Thomas    MRN: [id number]           Patient Information     Date Of Birth          1945        Visit Information        Provider Department      2/28/2018 1:00 PM Izabela Barrera APRN [facility]        Today's Diagnoses     Benign essential hypertension          Care Instructions    Continue to follow up with the sleep doctors to find something to treat your sleep apnea.    Try a new blood pressure cuff. Take your BP daily and call my nurse in a couple of weeks with the readings. If you are consistently above 120s, I'll increase your amlodipine to 5 mg daily.    Jennifer  [phone]          Follow-ups after your visit        Your next 10 appointments already scheduled     Mar 01, 2018 10:30 AM CST   PAP SETUP with  SLEEP CENTER [facility] ([facility])    [address] [phone] [phone]            Jun 14, 2018 10:30 AM CDT   (Arrive by 10:15 AM)   Return Visit with Khoa Handy MD   [facility] ([facility])    [address] [place] [address]  [address] [place] [id number] [phone]            Aug 27, 2018  9:30 AM CDT   Office Visit with Yaneli Dozier MD   [facility] ([facility])    [address] [place] [phone]           Bring a current list of meds and any records pertaining to this visit. For Physicals, please bring immunization records and any forms needing to be filled out. Please arrive 10 minutes early to complete paperwork.              Who to contact     If you have questions or need follow up information about today's clinic visit or your schedule please contact [facility] directly at [phone].  Normal or non-critical lab and imaging results will be  "communicated to you by MyChart, letter or phone within 4 business days after the clinic has received the results. If you do not hear from us within 7 days, please contact the clinic through [employer] or phone. If you have a critical or abnormal lab result, we will notify you by phone as soon as possible.  Submit refill requests through [employer] or call your pharmacy and they will forward the refill request to us. Please allow 3 business days for your refill to be completed.          Additional Information About Your Visit        [employer] Information     [employer] gives you secure access to your electronic health record. If you see a primary care provider, you can also send messages to your care team and make appointments. If you have questions, please call your primary care clinic.  If you do not have a primary care provider, please call [phone] and they will assist you.        Care EveryWhere ID     This is your Care EveryWhere ID. This could be used by other organizations to access your [place] medical records  [id number]        Your Vitals Were     Pulse Height BMI (Body Mass Index)             64 1.753 m (5' 9\") 27.91 kg/m2          Blood Pressure from Last 3 Encounters:   02/28/18 129/68   02/26/18 135/75   02/26/18 126/70    Weight from Last 3 Encounters:   02/28/18 85.7 kg (189 lb)   02/26/18 85.3 kg (188 lb)   02/26/18 85.3 kg (188 lb)              We Performed the Following     Follow-Up with Cardiac Advanced Practice Provider        Primary Care Provider Office Phone # Fax #    Bhavjot MD Tasia [phone] [address] [facility] [address]  [facility] [address]        Equal Access to Services     [facility] Hospital[facility]ANJEET AH: Hadii mann Marcus, ingridda imani, qaybconnie duran. So [facility] [phone].    ATENCIÓN: Si habla español, tiene a ann disposición servicios gratuitos de asistencia lingüística. Roddy al [phone].    We comply with applicable " federal civil rights laws and [place] laws. We do not discriminate on the basis of race, color, national origin, age, disability, sex, sexual orientation, or gender identity.            Thank you!     Thank you for choosing [facility]  for your care. Our goal is always to provide you with excellent care. Hearing back from our patients is one way we can continue to improve our services. Please take a few minutes to complete the written survey that you may receive in the mail after your visit with us. Thank you!             Your Updated Medication List - Protect others around you: Learn how to safely use, store and throw away your medicines at www.disposemymeds.org.          This list is accurate as of 2/28/18  1:19 PM.  Always use your most recent med list.                   Brand Name Dispense Instructions for use Diagnosis    alfuzosin 10 MG 24 hr tablet    UROXATRAL    90 tablet    TAKE 1 TABLET (10 MG) BY MOUTH DAILY AFTER A MEAL    Benign non-nodular prostatic hyperplasia with lower urinary tract symptoms       amLODIPine 2.5 MG tablet    NORVASC    30 tablet    Take 1 tablet (2.5 mg) by mouth daily    Benign essential hypertension       aspirin 81 MG tablet      Take 1 tablet by mouth 2 times daily        budesonide-formoterol 160-4.5 MCG/ACT Inhaler    SYMBICORT    3 Inhaler    Inhale 2 puffs into the lungs 2 times daily    COPD exacerbation (H)       fish oil-omega-3 fatty acids 1000 MG capsule      take one tablet twice daily        HYDROcodone-acetaminophen 5-325 MG per tablet    NORCO    180 tablet    Take 1 tablet by mouth every 4 hours as needed    Spinal stenosis of lumbar region with neurogenic claudication       INCRUSE ELLIPTA 62.5 MCG/INH oral inhaler   Generic drug:  umeclidinium     1 Inhaler    INHALE 1 PUFF INTO THE LUNGS DAILY    COPD exacerbation (H)       levalbuterol 0.31 MG/3ML neb solution    XOPENEX    720 mL    INHALE 1 VIAL (3ML) BY NEBULIZATION  EVERY 4 HOURS AS NEEDED FOR WHEEZING OR SHORTNESS OF BREATH.    COPD exacerbation (H)       lidocaine 5 % Patch    LIDODERM    30 patch    Place 1 patch onto the skin every 24 hours    Chronic bilateral low back pain without sciatica       lisinopril 40 MG tablet    PRINIVIL/ZESTRIL    90 tablet    TAKE 1 TABLET (40 MG) BY MOUTH DAILY    Benign essential hypertension       methimazole 5 MG tablet    TAPAZOLE     Take 5 mg by mouth Take one tablet daily on Monday and Thursday        metoprolol succinate 25 MG 24 hr tablet    TOPROL-XL    90 tablet    Take 1 tablet (25 mg) by mouth daily    Benign essential hypertension       MULTIVITAMIN PO      Take by mouth daily        nitroGLYcerin 0.4 MG sublingual tablet    NITROSTAT    30 tablet    Place 1 tablet (0.4 mg) under the tongue See Admin Instructions for chest pain    Coronary artery disease involving native coronary artery of native heart without angina pectoris       PROAIR  (90 BASE) MCG/ACT Inhaler   Generic drug:  albuterol     18 g    INHALE 2 PUFFS INTO THE LUNGS EVERY 6 HOURS AS NEEDED FOR SHORTNESS OF BREATH / DYSPNEA OR WHEEZING    COPD exacerbation (H)       roflumilast 500 MCG Tabs tablet    DALIRESP    31 tablet    Take 1 tablet (500 mcg) by mouth daily    Chronic obstructive pulmonary disease, unspecified COPD type (H)       * rosuvastatin 10 MG tablet    CRESTOR    90 tablet    Hold for 2 weeks to see if rash improves    Hyperlipidemia LDL goal <100       * rosuvastatin 10 MG tablet    CRESTOR    90 tablet    TAKE 1 TABLET (10 MG) BY MOUTH DAILY    Hyperlipidemia LDL goal <100       * Notice:  This list has 2 medication(s) that are the same as other medications prescribed for you. Read the directions carefully, and ask your doctor or other care provider to review them with you.

## 2018-03-01 RX ORDER — AMLODIPINE BESYLATE 5 MG/1
5 TABLET ORAL DAILY
Qty: 30 TABLET | Refills: 3 | Status: SHIPPED | OUTPATIENT
Start: 2018-03-01 | End: 2018-06-27

## 2018-03-05 ENCOUNTER — DOCUMENTATION ONLY (OUTPATIENT)
Dept: SLEEP MEDICINE | Facility: CLINIC | Age: 73
End: 2018-03-05

## 2018-03-05 NOTE — PROGRESS NOTES
3/5/18- ELSY MENDENHALL CALLED AND CANCELLED HIS CPAP SETUP TODAY 3/5/18 DUE TO MONEY ISSUES. HE WANTS 1600.00 DOLLARS HE PD OUT OF POCKET FOR SLEEP STUDY TO GET CREDITED TO HIS INSURANCE, SO HIS OUT OF POCKET  EXPENSE WILL BE  LOWER. HE  WILL THEN CALL TO SCHEDULE SETUP ONCE INSURANCE HAS BEEN  CREDITED.

## 2018-03-13 ENCOUNTER — TELEPHONE (OUTPATIENT)
Dept: FAMILY MEDICINE | Facility: CLINIC | Age: 73
End: 2018-03-13

## 2018-03-13 NOTE — TELEPHONE ENCOUNTER
Reason for Call:  Medication question    Detailed comments: Pt states he was having muscle pain in his legs pt states he stopped taking rosuvastatin (CRESTOR) 10 MG tablet and the muscle pain went away- ( pt has not been taking for 10 days)   Pt is wondering if there is another cholesterol medication he should take        [facility] #[id number] - [place], [place] - [address] [place] [address]      Phone Number Patient can be reached at: Cell number on file:    Telephone Information:   Mobile [phone]       Best Time: any    Can we leave a detailed message on this number? YES    Call taken on 3/13/2018 at 1:34 PM by Bita Feliz

## 2018-03-13 NOTE — TELEPHONE ENCOUNTER
Patient informed of below response from provider.   He will try 1/2 tablet daily and will call back if he experiences muscle pains on that dosage   Zaria HAYS RN

## 2018-03-13 NOTE — TELEPHONE ENCOUNTER
DOD,    See below. Pt was at Px 2/26/18.   Pt did trial off statin for 10 days, and muscle aches/pains relieved.  Has historically tried Lipitor, Crestor    Please advise if change should be made now, or f/u needed for this?  Noar Rojo RN

## 2018-03-13 NOTE — TELEPHONE ENCOUNTER
Could he try 1/2 tablet?  It's a very potent cholesterol lowering drug and would be very effective even at that low dose.

## 2018-03-26 DIAGNOSIS — J44.1 COPD EXACERBATION (H): ICD-10-CM

## 2018-03-27 RX ORDER — ALBUTEROL SULFATE 90 UG/1
AEROSOL, METERED RESPIRATORY (INHALATION)
Qty: 8.5 INHALER | Refills: 3 | Status: SHIPPED | OUTPATIENT
Start: 2018-03-27 | End: 2018-10-19

## 2018-05-07 ENCOUNTER — TELEPHONE (OUTPATIENT)
Dept: FAMILY MEDICINE | Facility: CLINIC | Age: 73
End: 2018-05-07

## 2018-05-07 ENCOUNTER — RADIANT APPOINTMENT (OUTPATIENT)
Dept: GENERAL RADIOLOGY | Facility: CLINIC | Age: 73
End: 2018-05-07
Attending: INTERNAL MEDICINE
Payer: COMMERCIAL

## 2018-05-07 ENCOUNTER — OFFICE VISIT (OUTPATIENT)
Dept: FAMILY MEDICINE | Facility: CLINIC | Age: 73
End: 2018-05-07
Payer: COMMERCIAL

## 2018-05-07 VITALS
BODY MASS INDEX: 27.4 KG/M2 | HEART RATE: 88 BPM | TEMPERATURE: 98.8 F | DIASTOLIC BLOOD PRESSURE: 68 MMHG | WEIGHT: 185 LBS | HEIGHT: 69 IN | SYSTOLIC BLOOD PRESSURE: 128 MMHG | OXYGEN SATURATION: 93 %

## 2018-05-07 DIAGNOSIS — J18.9 PNEUMONIA OF RIGHT UPPER LOBE DUE TO INFECTIOUS ORGANISM: Primary | ICD-10-CM

## 2018-05-07 DIAGNOSIS — S22.42XA CLOSED FRACTURE OF SEVEN RIBS OF LEFT SIDE, INITIAL ENCOUNTER: ICD-10-CM

## 2018-05-07 DIAGNOSIS — M48.062 SPINAL STENOSIS OF LUMBAR REGION WITH NEUROGENIC CLAUDICATION: ICD-10-CM

## 2018-05-07 DIAGNOSIS — J44.9 CHRONIC OBSTRUCTIVE PULMONARY DISEASE, UNSPECIFIED COPD TYPE (H): ICD-10-CM

## 2018-05-07 DIAGNOSIS — R07.81 RIB PAIN ON LEFT SIDE: ICD-10-CM

## 2018-05-07 LAB
ERYTHROCYTE [DISTWIDTH] IN BLOOD BY AUTOMATED COUNT: 13.7 % (ref 10–15)
HCT VFR BLD AUTO: 46.2 % (ref 40–53)
HGB BLD-MCNC: 15.5 G/DL (ref 13.3–17.7)
MCH RBC QN AUTO: 30.8 PG (ref 26.5–33)
MCHC RBC AUTO-ENTMCNC: 33.5 G/DL (ref 31.5–36.5)
MCV RBC AUTO: 92 FL (ref 78–100)
PLATELET # BLD AUTO: 197 10E9/L (ref 150–450)
PSA SERPL-ACNC: 4.65 UG/L (ref 0–4)
RBC # BLD AUTO: 5.03 10E12/L (ref 4.4–5.9)
WBC # BLD AUTO: 17.6 10E9/L (ref 4–11)

## 2018-05-07 PROCEDURE — 71101 X-RAY EXAM UNILAT RIBS/CHEST: CPT | Mod: LT

## 2018-05-07 PROCEDURE — 85027 COMPLETE CBC AUTOMATED: CPT | Performed by: INTERNAL MEDICINE

## 2018-05-07 PROCEDURE — 99214 OFFICE O/P EST MOD 30 MIN: CPT | Performed by: INTERNAL MEDICINE

## 2018-05-07 PROCEDURE — G0103 PSA SCREENING: HCPCS | Performed by: INTERNAL MEDICINE

## 2018-05-07 PROCEDURE — 36415 COLL VENOUS BLD VENIPUNCTURE: CPT | Performed by: INTERNAL MEDICINE

## 2018-05-07 RX ORDER — LIDOCAINE 50 MG/G
PATCH TOPICAL
Qty: 60 PATCH | Refills: 0 | Status: SHIPPED | OUTPATIENT
Start: 2018-05-07 | End: 2019-06-13

## 2018-05-07 RX ORDER — HYDROCODONE BITARTRATE AND ACETAMINOPHEN 5; 325 MG/1; MG/1
1 TABLET ORAL EVERY 4 HOURS PRN
Qty: 180 TABLET | Refills: 0 | Status: SHIPPED | OUTPATIENT
Start: 2018-05-07 | End: 2018-06-21

## 2018-05-07 RX ORDER — CEFUROXIME AXETIL 500 MG/1
500 TABLET ORAL 2 TIMES DAILY
Qty: 14 TABLET | Refills: 0 | Status: SHIPPED | OUTPATIENT
Start: 2018-05-07 | End: 2018-05-14

## 2018-05-07 RX ORDER — PREDNISONE 10 MG/1
20 TABLET ORAL DAILY
Qty: 10 TABLET | Refills: 1 | Status: SHIPPED | OUTPATIENT
Start: 2018-05-07 | End: 2018-08-27
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
## 2018-05-07 NOTE — MR AVS SNAPSHOT
After Visit Summary   5/7/2018    Harrison Thomas    MRN: [id number]           Patient Information     Date Of Birth          1945        Visit Information        Provider Department      5/7/2018 9:30 AM Yaneli Dozier MD [facility]        Today's Diagnoses     Pneumonia of right upper lobe due to infectious organism (H)    -  1    Closed fracture of seven ribs of left side, initial encounter        Chronic obstructive pulmonary disease, unspecified COPD type (H)        Spinal stenosis of lumbar region with neurogenic claudication          Care Instructions    Hydrocodone every 4 hours if needed    Lidocaine patch 5 %   Use for 12 hours, off for 12 hours    Incentive spirometer and flutter valve    Ceftin for 5-7 days 500 mg twice daily with food    Prednisone 20 mg daily for 5 days          Follow-ups after your visit        Your next 10 appointments already scheduled     May 07, 2018  9:30 AM CDT   Office Visit with Yaneli Dozier MD   [facility])    [address] [id number] [phone]           Bring a current list of meds and any records pertaining to this visit. For Physicals, please bring immunization records and any forms needing to be filled out. Please arrive 10 minutes early to complete paperwork.            Jun 14, 2018 10:30 AM CDT   (Arrive by 10:15 AM)   Return Visit with Khoa Handy MD   [facility] ([facility])    [address] [place] [address]  [address] [place] [id number]   [phone]            Aug 27, 2018  9:30 AM CDT   [facility]    [address] [id number] [phone]           Bring a current list of meds and any records pertaining to this visit. For Physicals, please bring immunization records and any forms needing to be filled out. Please arrive  "10 minutes early to complete paperwork.              Who to contact     If you have questions or need follow up information about today's clinic visit or your schedule please contact [facility] directly at [phone].  Normal or non-critical lab and imaging results will be communicated to you by MyChart, letter or phone within 4 business days after the clinic has received the results. If you do not hear from us within 7 days, please contact the clinic through [employer] or phone. If you have a critical or abnormal lab result, we will notify you by phone as soon as possible.  Submit refill requests through [employer] or call your pharmacy and they will forward the refill request to us. Please allow 3 business days for your refill to be completed.          Additional Information About Your Visit        [employer] Information     [employer] gives you secure access to your electronic health record. If you see a primary care provider, you can also send messages to your care team and make appointments. If you have questions, please call your primary care clinic.  If you do not have a primary care provider, please call [phone] and they will assist you.        Care EveryWhere ID     This is your Care EveryWhere ID. This could be used by other organizations to access your [facility] medical records  [id number]        Your Vitals Were     Pulse Temperature Height Pulse Oximetry BMI (Body Mass Index)       88 98.8  F (37.1  C) (Oral) 5' 9\" (1.753 m) 93% 27.32 kg/m2        Blood Pressure from Last 3 Encounters:   05/07/18 128/68   02/28/18 129/68   02/26/18 135/75    Weight from Last 3 Encounters:   05/07/18 185 lb (83.9 kg)   02/28/18 189 lb (85.7 kg)   02/26/18 188 lb (85.3 kg)              We Performed the Following     **CBC with platelets FUTURE anytime     PROSTATE SPEC ANTIGEN SCREEN          Today's Medication Changes          These changes are accurate as of 5/7/18  9:27 AM.  If you have any questions, ask your " nurse or doctor.               Start taking these medicines.        Dose/Directions    cefuroxime 500 MG tablet   Commonly known as:  CEFTIN   Used for:  Pneumonia of right upper lobe due to infectious organism (H)   Started by:  Yaneli Dozier MD        Dose:  500 mg   Take 1 tablet (500 mg) by mouth 2 times daily for 7 days   Quantity:  14 tablet   Refills:  0       order for DME   Used for:  Pneumonia of right upper lobe due to infectious organism (H), Closed fracture of seven ribs of left side, initial encounter   Started by:  Yaneli Dozier MD        Equipment being ordered: flutter valve Incentive spirometer   Quantity:  1 each   Refills:  0       predniSONE 10 MG tablet   Commonly known as:  DELTASONE   Used for:  Pneumonia of right upper lobe due to infectious organism (H), Chronic obstructive pulmonary disease, unspecified COPD type (H)   Started by:  Yaneli Dozier MD        Dose:  20 mg   Take 2 tablets (20 mg) by mouth daily   Quantity:  10 tablet   Refills:  1         These medicines have changed or have updated prescriptions.        Dose/Directions    * lidocaine 5 % Patch   Commonly known as:  LIDODERM   This may have changed:  Another medication with the same name was added. Make sure you understand how and when to take each.   Used for:  Chronic bilateral low back pain without sciatica   Changed by:  Yaneli oDzier MD        Dose:  1 patch   Place 1 patch onto the skin every 24 hours   Quantity:  30 patch   Refills:  1       * lidocaine 5 % Patch   Commonly known as:  LIDODERM   This may have changed:  You were already taking a medication with the same name, and this prescription was added. Make sure you understand how and when to take each.   Used for:  Closed fracture of seven ribs of left side, initial encounter   Changed by:  Yaneli Dozier MD        Apply up to 3 patches to painful area at once for up to 12 h within a 24 h period.  Remove after 12 hours.   Quantity:  60 patch   Refills:  0        * Notice:  This list has 2 medication(s) that are the same as other medications prescribed for you. Read the directions carefully, and ask your doctor or other care provider to review them with you.         Where to get your medicines      These medications were sent to [facility], [place] - [address], [address]  [address], [address], [place] [address]     Phone:  [phone]     lidocaine 5 % Patch    predniSONE 10 MG tablet         Some of these will need a paper prescription and others can be bought over the counter.  Ask your nurse if you have questions.     Bring a paper prescription for each of these medications     cefuroxime 500 MG tablet    HYDROcodone-acetaminophen 5-325 MG per tablet    order for DME               Information about OPIOIDS     PRESCRIPTION OPIOIDS: WHAT YOU NEED TO KNOW   You have a prescription for an opioid (narcotic) pain medicine. Opioids can cause addiction. If you have a history of chemical dependency of any type, you are at a higher risk of becoming addicted to opioids. Only take this medicine after all other options have been tried. Take it for as short a time and as few doses as possible.     Do not:    Drive. If you drive while taking these medicines, you could be arrested for driving under the influence (DUI).    Operate heavy machinery    Do any other dangerous activities while taking these medicines.     Drink any alcohol while taking these medicines.      Take with any other medicines that contain acetaminophen. Read all labels carefully. Look for the word  acetaminophen  or  Tylenol.  Ask your pharmacist if you have questions or are unsure.    Store your pills in a secure place, locked if possible. We will not replace any lost or stolen medicine. If you don t finish your medicine, please throw away (dispose) as directed by your pharmacist. The [place] Pollution Control Agency has more information about safe disposal:  https://www.pca.[facility].[place].us/living-green/managing-unwanted-medications    All opioids tend to cause constipation. Drink plenty of water and eat foods that have a lot of fiber, such as fruits, vegetables, prune juice, apple juice and high-fiber cereal. Take a laxative (Miralax, milk of magnesia, Colace, Senna) if you don t move your bowels at least every other day.          Primary Care Provider Office Phone # Fax #    Yaneli Dozier [phone] [address] [facility] [address]  [facility] [address]        Equal Access to Services     [facility] CenterRANJEET : Hadii mann Marcus, marianna diallo, tiff sanchez, connie silva . So [facility] [phone].    ATENCIÓN: Si habla español, tiene a ann disposición servicios gratuitos de asistencia lingüística. LlKettering Health Hamilton [phone].    We comply with applicable federal civil rights laws and [place] laws. We do not discriminate on the basis of race, color, national origin, age, disability, sex, sexual orientation, or gender identity.            Thank you!     Thank you for choosing [facility]  for your care. Our goal is always to provide you with excellent care. Hearing back from our patients is one way we can continue to improve our services. Please take a few minutes to complete the written survey that you may receive in the mail after your visit with us. Thank you!             Your Updated Medication List - Protect others around you: Learn how to safely use, store and throw away your medicines at www.disposemymeds.org.          This list is accurate as of 5/7/18  9:27 AM.  Always use your most recent med list.                   Brand Name Dispense Instructions for use Diagnosis    alfuzosin 10 MG 24 hr tablet    UROXATRAL    90 tablet    TAKE 1 TABLET (10 MG) BY MOUTH DAILY AFTER A MEAL    Benign non-nodular prostatic hyperplasia with lower urinary tract symptoms       amLODIPine 5 MG tablet    NORVASC    30 tablet     Take 1 tablet (5 mg) by mouth daily    Benign essential hypertension       aspirin 81 MG tablet      Take 1 tablet by mouth 2 times daily        budesonide-formoterol 160-4.5 MCG/ACT Inhaler    SYMBICORT    3 Inhaler    Inhale 2 puffs into the lungs 2 times daily    COPD exacerbation (H)       cefuroxime 500 MG tablet    CEFTIN    14 tablet    Take 1 tablet (500 mg) by mouth 2 times daily for 7 days    Pneumonia of right upper lobe due to infectious organism (H)       fish oil-omega-3 fatty acids 1000 MG capsule      take one tablet twice daily        HYDROcodone-acetaminophen 5-325 MG per tablet    NORCO    180 tablet    Take 1 tablet by mouth every 4 hours as needed    Spinal stenosis of lumbar region with neurogenic claudication       INCRUSE ELLIPTA 62.5 MCG/INH oral inhaler   Generic drug:  umeclidinium     1 Inhaler    INHALE 1 PUFF INTO THE LUNGS DAILY    COPD exacerbation (H)       levalbuterol 0.31 MG/3ML neb solution    XOPENEX    720 mL    INHALE 1 VIAL (3ML) BY NEBULIZATION EVERY 4 HOURS AS NEEDED FOR WHEEZING OR SHORTNESS OF BREATH.    COPD exacerbation (H)       * lidocaine 5 % Patch    LIDODERM    30 patch    Place 1 patch onto the skin every 24 hours    Chronic bilateral low back pain without sciatica       * lidocaine 5 % Patch    LIDODERM    60 patch    Apply up to 3 patches to painful area at once for up to 12 h within a 24 h period.  Remove after 12 hours.    Closed fracture of seven ribs of left side, initial encounter       lisinopril 40 MG tablet    PRINIVIL/ZESTRIL    90 tablet    TAKE 1 TABLET (40 MG) BY MOUTH DAILY    Benign essential hypertension       methimazole 5 MG tablet    TAPAZOLE     Take 5 mg by mouth Take one tablet daily on Monday and Thursday        metoprolol succinate 25 MG 24 hr tablet    TOPROL-XL    90 tablet    Take 1 tablet (25 mg) by mouth daily    Benign essential hypertension       MULTIVITAMIN PO      Take by mouth daily        nitroGLYcerin 0.4 MG sublingual  tablet    NITROSTAT    30 tablet    Place 1 tablet (0.4 mg) under the tongue See Admin Instructions for chest pain    Coronary artery disease involving native coronary artery of native heart without angina pectoris       order for DME     1 each    Equipment being ordered: flutter valve Incentive spirometer    Pneumonia of right upper lobe due to infectious organism (H), Closed fracture of seven ribs of left side, initial encounter       predniSONE 10 MG tablet    DELTASONE    10 tablet    Take 2 tablets (20 mg) by mouth daily    Pneumonia of right upper lobe due to infectious organism (H), Chronic obstructive pulmonary disease, unspecified COPD type (H)       PROAIR  (90 Base) MCG/ACT Inhaler   Generic drug:  albuterol     8.5 Inhaler    INHALE 2 PUFFS INTO THE LUNGS EVERY 6 HOURS AS NEEDED FOR SHORTNESS OF BREATH / DYSPNEA OR WHEEZING    COPD exacerbation (H)       roflumilast 500 MCG Tabs tablet    DALIRESP    31 tablet    Take 1 tablet (500 mcg) by mouth daily    Chronic obstructive pulmonary disease, unspecified COPD type (H)       rosuvastatin 10 MG tablet    CRESTOR    90 tablet    TAKE 1 TABLET (10 MG) BY MOUTH DAILY    Hyperlipidemia LDL goal <100       * Notice:  This list has 2 medication(s) that are the same as other medications prescribed for you. Read the directions carefully, and ask your doctor or other care provider to review them with you.

## 2018-05-07 NOTE — TELEPHONE ENCOUNTER
"Patient calling to request OV today with Dr Dozier.   Scheduled by  today with Dr Dozier at 4pm (must have had a cancellation?).    Advised ED--however patient is refusing.  ONLY wants to see Dr Dozier.  States he will only agree to go to ED AFTER seeing Dr Dozier if she advises.    OK to keep appointment or other advise?  Currently only have Xray in clinic today 8-1pm (might change?)      Friday afternoon patient lost balance while working outside and left chest fell into corner of brick wall.   Symptoms:  Pain at site of injury. Worse with inspiration and any movement.  \"It hurts to breath.\"   Small cut and bruise at site of injury.    Patient has COPD, so admits to SOB, light-headedness \"most of the time\".    Fever: no    [phone] (home) [phone] (work)      Thank you,  Brenda CAMEJO RN,BSN    "

## 2018-05-07 NOTE — TELEPHONE ENCOUNTER
Reason for call:  Patient reporting a symptom    Symptom or request: over the weekend the pt fell into a retaining wall. He believes he might have internal damage.  I sent the call to triage, and made him an appointment with Dr. Dozier today at 4    Duration (how long have symptoms been present): 2 days    Have you been treated for this before? No    Additional comments:     Phone Number patient can be reached at:  Cell number on file:    Telephone Information:   Mobile [phone]       Best Time:  any    Can we leave a detailed message on this number:  YES    Call taken on 5/7/2018 at 7:22 AM by Traci Fuentes

## 2018-05-07 NOTE — PATIENT INSTRUCTIONS
Hydrocodone every 4 hours if needed    Lidocaine patch 5 %   Use for 12 hours, off for 12 hours    Incentive spirometer and flutter valve    Ceftin for 5-7 days 500 mg twice daily with food    Prednisone 20 mg daily for 5 days

## 2018-06-14 ENCOUNTER — OFFICE VISIT (OUTPATIENT)
Dept: PULMONOLOGY | Facility: CLINIC | Age: 73
End: 2018-06-14
Attending: INTERNAL MEDICINE
Payer: COMMERCIAL

## 2018-06-14 VITALS
OXYGEN SATURATION: 96 % | HEIGHT: 69 IN | HEART RATE: 56 BPM | RESPIRATION RATE: 17 BRPM | DIASTOLIC BLOOD PRESSURE: 76 MMHG | BODY MASS INDEX: 27.4 KG/M2 | SYSTOLIC BLOOD PRESSURE: 154 MMHG | WEIGHT: 185 LBS

## 2018-06-14 DIAGNOSIS — J42 CHRONIC BRONCHITIS, UNSPECIFIED CHRONIC BRONCHITIS TYPE (H): ICD-10-CM

## 2018-06-14 DIAGNOSIS — G47.34 NOCTURNAL HYPOXEMIA: Primary | ICD-10-CM

## 2018-06-14 DIAGNOSIS — G47.33 OSA (OBSTRUCTIVE SLEEP APNEA): ICD-10-CM

## 2018-06-14 DIAGNOSIS — J44.1 OBSTRUCTIVE CHRONIC BRONCHITIS WITH EXACERBATION (H): ICD-10-CM

## 2018-06-14 PROCEDURE — G0463 HOSPITAL OUTPT CLINIC VISIT: HCPCS | Mod: ZF

## 2018-06-14 RX ORDER — METHYLPREDNISOLONE 4 MG
TABLET, DOSE PACK ORAL
Qty: 21 TABLET | Refills: 3 | Status: SHIPPED | OUTPATIENT
Start: 2018-06-14 | End: 2018-08-27

## 2018-06-14 RX ORDER — AZITHROMYCIN 500 MG/1
500 TABLET, FILM COATED ORAL DAILY
Qty: 14 TABLET | Refills: 3 | Status: SHIPPED | OUTPATIENT
Start: 2018-06-14 | End: 2018-06-28

## 2018-06-14 ASSESSMENT — PAIN SCALES - GENERAL: PAINLEVEL: NO PAIN (1)

## 2018-06-14 NOTE — LETTER
6/14/2018       RE: Harrison Thomas  [address]  [place] [address]     Dear Colleague,    Thank you for referring your patient, Harrison Thomas, to the [facility]. Please see a copy of my visit note below.    Reason for visit: Follow up COPD     History of Present Illness:   is a 72-year-old gentleman with a history of severe COPD who is here for follow up. His current medication therapy is albuterol rescue inhaler prn, Xopenex, prn, and Symbicort in the am and before bed Mr Thomas says that he is feeling similar to last time he was seen in clinic and that he keeps waking up at night due to low oxygen saturation during sleep. He has a pulse oximeter at home that he uses when he wakes up at night feeling short of breath. In the past O2 saturation has come down to 87% in room air. He had a sleep study, in which a CPAP was recommended, but because of financial reason he has not been able to acquire the equipment. He would like to know if oxygen at night is an option for him and that if we can prescribed it in the form of a portable concentrator as he frequently travels.     He has a recent bout of pneumonia accompanied with a rib fracture, which made it even more difficult to breath. His PCP, Dr. Dozier prescribed a demerol dose pack and azithromycin and pulmonary symptoms resolved in about a week. He says he is still recovering from the rib fracture. Patient denies any ACEVES.     ROS Pulmonary  A complete ROS was otherwise negative except as noted in the HPI.  /76  Pulse 56  Resp 17  SpO2 96%   Exam:   GENERAL APPEARANCE: Well developed, well nourished, alert, and in no apparent distress.  No cough, tachypnea, audible wheeze  EYES: PERRL, EOMI  HENT: Nasal mucosa with no edema and no hyperemia. No sinus tenderness  NECK: supple, no masses, no thyromegaly.  LYMPHATICS: No significant  axillary, cervical, or supraclavicular nodes.  RESP: normal inspection, palpation & percussion, good air flow throughout.  No crackles. No rhonchi. No wheezes or crackles  CV: RRR  Normal S1, S2, regular rhythm, normal rate. No murmur.  No rub. No gallop. No LE edema.   MS: extremities normal. No clubbing. No cyanosis. SKIN: mild rash on face.   NEURO: Mentation intact, speech normal, normal strength and tone, normal gait and stance  PSYCH: mentation appears normal. and affect normal/bright           Current Outpatient Prescriptions   Medication     roflumilast (DALIRESP) 500 MCG TABS tablet     HYDROcodone-acetaminophen (NORCO) 5-325 MG per tablet     levalbuterol (XOPENEX) 0.31 MG/3ML neb solution     metoprolol (TOPROL-XL) 25 MG 24 hr tablet     lisinopril (PRINIVIL/ZESTRIL) 40 MG tablet     rosuvastatin (CRESTOR) 10 MG tablet     lidocaine (LIDODERM) 5 % Patch     alfuzosin (UROXATRAL) 10 MG 24 hr tablet     alfuzosin (UROXATRAL) 10 MG 24 hr tablet     nitroglycerin (NITROSTAT) 0.4 MG sublingual tablet     budesonide-formoterol (SYMBICORT) 160-4.5 MCG/ACT Inhaler     albuterol (ALBUTEROL) 108 (90 BASE) MCG/ACT inhaler     carisoprodol (SOMA) 350 MG tablet     methimazole (TAPAZOLE) 5 MG tablet     Multiple Vitamins-Minerals (MULTIVITAMIN OR)     aspirin 81 MG tablet     FISH OIL 1000 MG OR CAPS      No current facility-administered medications for this visit.             Allergies   Allergen Reactions     Levaquin Difficulty breathing     Plavix [Clopidogrel Bisulfate] Itching     Atorvastatin Calcium Cramps       lipitor     Cats       Dogs       Hctz [Hydrochlorothiazide]         Rash on legs           Results:  No labs or pulmonary function test were performed today in clinic        Assessment and plan:   is a 72-year-old gentleman with a history of severe COPD who is here for follow up.  1. Low O2 saturation during sleep: Because Mr Thomas is not able to acquire a CPAP at the moment we will  prescribed a portable oxygen concentrator with a flow of 2L for use during sleep for his diagnosed obstructive sleep apnea.   2. COPD: Condition is stable and he should continue on current therapy. A prescription for a medrol dose pack and azithromycin was added for flare ups.      We will see him back in Pulmonary Clinic in 6 months with PFTs. He was given Willie Early RN phone number to coordinate oxygen delivery and to call if any problem occurs in the interim.       Pulmonary Attending Attestation  I saw and personally examined the patient with G Velez-REyes MSTP, confirming key aspects of the history and exam.  I personally reviewed the recent Xrays and other labs.  The above note reflects our detailed discussion of the findings, assessment and plan.  We will continue current COPD maintenance therapy but I prescribed steroids and abx to have available for AECB.  I reviewed his prior sleep study and the notes from Dr. Mccabe in the Sleep Clinic.  He says that he can't afford the CPAP machine and is asking for O2 to treat his nocturnal hypoxemia.  I asked him to return to sleep clinic for evaluation of possible mandibular advancing device or ways to get CPAP at lower cost. (He asked to see an alternate MD and I gave him Dr Lui's name).  I expressed concern that use of nocturnal O2 w/o CPAP could worsen nocturnal CO2 retention, but given level of hypoxemia I am willing to prescribe nocturnal O2.  Since travels a lot, he asked for portable system.  I introduced him to Brandon Early RN who will work with him to obtain appropriate system.  Khoa Handy MD

## 2018-06-14 NOTE — MR AVS SNAPSHOT
After Visit Summary   6/14/2018    Harrison Thomas    MRN: [id number]           Patient Information     Date Of Birth          1945        Visit Information        Provider Department      6/14/2018 10:30 AM Khoa Handy MD [facility]        Today's Diagnoses     Nocturnal hypoxemia    -  1    BRENNEN (obstructive sleep apnea)        Chronic bronchitis, unspecified chronic bronchitis type (H)        Obstructive chronic bronchitis with exacerbation (H)           Follow-ups after your visit        Follow-up notes from your care team     Return in about 6 months (around 12/14/2018).      Your next 10 appointments already scheduled     Aug 27, 2018  9:30 AM CDT   Office Visit with Yaneli Dozier MD   [facility]    [address] [place] [address] [phone]           Bring a current list of meds and any records pertaining to this visit. For Physicals, please bring immunization records and any forms needing to be filled out. Please arrive 10 minutes early to complete paperwork.            Dec 20, 2018 10:30 AM CST   SIX MINUTE WALK with UC PFL A, UC PFL B   [facility] Pulmonary Function Testing ([facility])    [address] [facility] [facility]  3rd Floor  [facility] [address] [phone]            Dec 20, 2018 11:30 AM CST   (Arrive by 11:15 AM)   Return Visit with Khoa Handy MD   [facility] ([facility])    [address] [facility]  [address] [place] [address] [phone]              Who to contact     If you have questions or need follow up information about today's clinic visit or your schedule please contact [facility] directly at [phone].  Normal or non-critical lab and imaging results will be communicated to you by MyChart, letter or phone within 4 business days after the clinic has  "received the results. If you do not hear from us within 7 days, please contact the clinic through [employer] or phone. If you have a critical or abnormal lab result, we will notify you by phone as soon as possible.  Submit refill requests through Inter[employer]ude or call your pharmacy and they will forward the refill request to us. Please allow 3 business days for your refill to be completed.          Additional Information About Your Visit        [employer] Information     Inter[employer]ude gives you secure access to your electronic health record. If you see a primary care provider, you can also send messages to your care team and make appointments. If you have questions, please call your primary care clinic.  If you do not have a primary care provider, please call [phone] and they will assist you.        Care EveryWhere ID     This is your Care EveryWhere ID. This could be used by other organizations to access your [facility] medical records  [id number]        Your Vitals Were     Pulse Respirations Height Pulse Oximetry BMI (Body Mass Index)       56 17 1.753 m (5' 9\") 96% 27.32 kg/m2        Blood Pressure from Last 3 Encounters:   06/14/18 154/76   05/07/18 128/68   02/28/18 129/68    Weight from Last 3 Encounters:   06/14/18 83.9 kg (185 lb)   05/07/18 83.9 kg (185 lb)   02/28/18 85.7 kg (189 lb)                 Today's Medication Changes          These changes are accurate as of 6/14/18 11:59 PM.  If you have any questions, ask your nurse or doctor.               Start taking these medicines.        Dose/Directions    azithromycin 500 MG tablet   Commonly known as:  ZITHROMAX   Used for:  Obstructive chronic bronchitis with exacerbation (H)   Started by:  Khoa Handy MD        Dose:  500 mg   Take 1 tablet (500 mg) by mouth daily for 14 days One tablet per day   Quantity:  14 tablet   Refills:  3       methylPREDNISolone 4 MG tablet   Commonly known as:  MEDROL DOSEPAK   Used for:  Obstructive chronic bronchitis with " exacerbation (H)   Started by:  Khoa Handy MD        Follow package instructions   Quantity:  21 tablet   Refills:  3       order for DME   Used for:  Chronic bronchitis, unspecified chronic bronchitis type (H), BRENNEN (obstructive sleep apnea), Nocturnal hypoxemia   Started by:  Khoa Handy MD        Equipment being ordered: Oxygen Please provide O2 continuous via NC from PORTABLE O2 concentrator for overnight usage at 2 LPM   Quantity:  1 Device   Refills:  0            Where to get your medicines      These medications were sent to [facility] #[id number] [address] [place] [address]  [address] [place] [address]     Phone:  [phone]     azithromycin 500 MG tablet    methylPREDNISolone 4 MG tablet         Some of these will need a paper prescription and others can be bought over the counter.  Ask your nurse if you have questions.     Bring a paper prescription for each of these medications     order for DME                Primary Care Provider Office Phone # Fax #    Bhron Dozier [phone] [address] [address]        Equal Access to Services     [facility] : Hadii mann live hadasho Soomaali, waaxda luqadaha, qaybta kaalmada adeegyayuri, connie silva . So [facility] [phone].    ATENCIÓN: Si habla español, tiene a ann disposición servicios gratuitos de asistencia lingüística. Llame al [phone].    We comply with applicable federal civil rights laws and [place] laws. We do not discriminate on the basis of race, color, national origin, age, disability, sex, sexual orientation, or gender identity.            Thank you!     Thank you for choosing [facility]  for your care. Our goal is always to provide you with excellent care. Hearing back from our patients is one way we can continue to improve our services. Please take a few minutes to complete the written survey that you may  receive in the mail after your visit with us. Thank you!             Your Updated Medication List - Protect others around you: Learn how to safely use, store and throw away your medicines at www.disposemymeds.org.          This list is accurate as of 6/14/18 11:59 PM.  Always use your most recent med list.                   Brand Name Dispense Instructions for use Diagnosis    alfuzosin 10 MG 24 hr tablet    UROXATRAL    90 tablet    TAKE 1 TABLET (10 MG) BY MOUTH DAILY AFTER A MEAL    Benign non-nodular prostatic hyperplasia with lower urinary tract symptoms       amLODIPine 5 MG tablet    NORVASC    30 tablet    Take 1 tablet (5 mg) by mouth daily    Benign essential hypertension       aspirin 81 MG tablet      Take 1 tablet by mouth 2 times daily        azithromycin 500 MG tablet    ZITHROMAX    14 tablet    Take 1 tablet (500 mg) by mouth daily for 14 days One tablet per day    Obstructive chronic bronchitis with exacerbation (H)       budesonide-formoterol 160-4.5 MCG/ACT Inhaler    SYMBICORT    3 Inhaler    Inhale 2 puffs into the lungs 2 times daily    COPD exacerbation (H)       fish oil-omega-3 fatty acids 1000 MG capsule      take one tablet twice daily        HYDROcodone-acetaminophen 5-325 MG per tablet    NORCO    180 tablet    Take 1 tablet by mouth every 4 hours as needed    Spinal stenosis of lumbar region with neurogenic claudication       INCRUSE ELLIPTA 62.5 MCG/INH oral inhaler   Generic drug:  umeclidinium     1 Inhaler    INHALE 1 PUFF INTO THE LUNGS DAILY    COPD exacerbation (H)       levalbuterol 0.31 MG/3ML neb solution    XOPENEX    720 mL    INHALE 1 VIAL (3ML) BY NEBULIZATION EVERY 4 HOURS AS NEEDED FOR WHEEZING OR SHORTNESS OF BREATH.    COPD exacerbation (H)       * lidocaine 5 % Patch    LIDODERM    30 patch    Place 1 patch onto the skin every 24 hours    Chronic bilateral low back pain without sciatica       * lidocaine 5 % Patch    LIDODERM    60 patch    Apply up to 3 patches to  painful area at once for up to 12 h within a 24 h period.  Remove after 12 hours.    Closed fracture of seven ribs of left side, initial encounter       lisinopril 40 MG tablet    PRINIVIL/ZESTRIL    90 tablet    TAKE 1 TABLET (40 MG) BY MOUTH DAILY    Benign essential hypertension       methimazole 5 MG tablet    TAPAZOLE     Take 5 mg by mouth Take one tablet daily on Monday and Thursday        methylPREDNISolone 4 MG tablet    MEDROL DOSEPAK    21 tablet    Follow package instructions    Obstructive chronic bronchitis with exacerbation (H)       metoprolol succinate 25 MG 24 hr tablet    TOPROL-XL    90 tablet    Take 1 tablet (25 mg) by mouth daily    Benign essential hypertension       MULTIVITAMIN PO      Take by mouth daily        nitroGLYcerin 0.4 MG sublingual tablet    NITROSTAT    30 tablet    Place 1 tablet (0.4 mg) under the tongue See Admin Instructions for chest pain    Coronary artery disease involving native coronary artery of native heart without angina pectoris       order for DME     1 each    Equipment being ordered: flutter valve Incentive spirometer    Pneumonia of right upper lobe due to infectious organism (H), Closed fracture of seven ribs of left side, initial encounter       order for DME     1 Device    Equipment being ordered: Oxygen Please provide O2 continuous via NC from PORTABLE O2 concentrator for overnight usage at 2 LPM    Chronic bronchitis, unspecified chronic bronchitis type (H), BRENNEN (obstructive sleep apnea), Nocturnal hypoxemia       predniSONE 10 MG tablet    DELTASONE    10 tablet    Take 2 tablets (20 mg) by mouth daily    Pneumonia of right upper lobe due to infectious organism (H), Chronic obstructive pulmonary disease, unspecified COPD type (H)       PROAIR  (90 Base) MCG/ACT Inhaler   Generic drug:  albuterol     8.5 Inhaler    INHALE 2 PUFFS INTO THE LUNGS EVERY 6 HOURS AS NEEDED FOR SHORTNESS OF BREATH / DYSPNEA OR WHEEZING    COPD exacerbation (H)        roflumilast 500 MCG Tabs tablet    DALIRESP    31 tablet    Take 1 tablet (500 mcg) by mouth daily    Chronic obstructive pulmonary disease, unspecified COPD type (H)       rosuvastatin 10 MG tablet    CRESTOR    90 tablet    TAKE 1 TABLET (10 MG) BY MOUTH DAILY    Hyperlipidemia LDL goal <100       * Notice:  This list has 2 medication(s) that are the same as other medications prescribed for you. Read the directions carefully, and ask your doctor or other care provider to review them with you.

## 2018-06-14 NOTE — PROGRESS NOTES
Reason for visit: Follow up COPD     History of Present Illness:   is a 72-year-old gentleman with a history of severe COPD who is here for follow up. His current medication therapy is albuterol rescue inhaler prn, Xopenex, prn, and Symbicort in the am and before bed Mr Thomas says that he is feeling similar to last time he was seen in clinic and that he keeps waking up at night due to low oxygen saturation during sleep. He has a pulse oximeter at home that he uses when he wakes up at night feeling short of breath. In the past O2 saturation has come down to 87% in room air. He had a sleep study, in which a CPAP was recommended, but because of financial reason he has not been able to acquire the equipment. He would like to know if oxygen at night is an option for him and that if we can prescribed it in the form of a portable concentrator as he frequently travels.     He has a recent bout of pneumonia accompanied with a rib fracture, which made it even more difficult to breath. His PCP, Dr. Dozier prescribed a demerol dose pack and azithromycin and pulmonary symptoms resolved in about a week. He says he is still recovering from the rib fracture. Patient denies any ACEVES.     ROS Pulmonary  A complete ROS was otherwise negative except as noted in the HPI.  /76  Pulse 56  Resp 17  SpO2 96%   Exam:   GENERAL APPEARANCE: Well developed, well nourished, alert, and in no apparent distress.  No cough, tachypnea, audible wheeze  EYES: PERRL, EOMI  HENT: Nasal mucosa with no edema and no hyperemia. No sinus tenderness  NECK: supple, no masses, no thyromegaly.  LYMPHATICS: No significant axillary, cervical, or supraclavicular nodes.  RESP: normal inspection, palpation & percussion, good air flow throughout.  No crackles. No rhonchi. No wheezes or crackles  CV: RRR  Normal S1, S2, regular rhythm, normal rate. No murmur.  No rub. No gallop. No LE edema.   MS: extremities normal. No clubbing. No cyanosis.  SKIN: mild rash on face.   NEURO: Mentation intact, speech normal, normal strength and tone, normal gait and stance  PSYCH: mentation appears normal. and affect normal/bright           Current Outpatient Prescriptions   Medication     roflumilast (DALIRESP) 500 MCG TABS tablet     HYDROcodone-acetaminophen (NORCO) 5-325 MG per tablet     levalbuterol (XOPENEX) 0.31 MG/3ML neb solution     metoprolol (TOPROL-XL) 25 MG 24 hr tablet     lisinopril (PRINIVIL/ZESTRIL) 40 MG tablet     rosuvastatin (CRESTOR) 10 MG tablet     lidocaine (LIDODERM) 5 % Patch     alfuzosin (UROXATRAL) 10 MG 24 hr tablet     alfuzosin (UROXATRAL) 10 MG 24 hr tablet     nitroglycerin (NITROSTAT) 0.4 MG sublingual tablet     budesonide-formoterol (SYMBICORT) 160-4.5 MCG/ACT Inhaler     albuterol (ALBUTEROL) 108 (90 BASE) MCG/ACT inhaler     carisoprodol (SOMA) 350 MG tablet     methimazole (TAPAZOLE) 5 MG tablet     Multiple Vitamins-Minerals (MULTIVITAMIN OR)     aspirin 81 MG tablet     FISH OIL 1000 MG OR CAPS      No current facility-administered medications for this visit.             Allergies   Allergen Reactions     Levaquin Difficulty breathing     Plavix [Clopidogrel Bisulfate] Itching     Atorvastatin Calcium Cramps       lipitor     Cats       Dogs       Hctz [Hydrochlorothiazide]         Rash on legs           Results:  No labs or pulmonary function test were performed today in clinic        Assessment and plan:   is a 72-year-old gentleman with a history of severe COPD who is here for follow up.  1. Low O2 saturation during sleep: Because Mr Thomas is not able to acquire a CPAP at the moment we will prescribed a portable oxygen concentrator with a flow of 2L for use during sleep for his diagnosed obstructive sleep apnea.   2. COPD: Condition is stable and he should continue on current therapy. A prescription for a medrol dose pack and azithromycin was added for flare ups.      We will see him back in Pulmonary  Clinic in 6 months with PFTs. He was given Willie Early RN phone number to coordinate oxygen delivery and to call if any problem occurs in the interim.       Pulmonary Attending Attestation  I saw and personally examined the patient with G Velez-REyes MSTP, confirming key aspects of the history and exam.  I personally reviewed the recent Xrays and other labs.  The above note reflects our detailed discussion of the findings, assessment and plan.  We will continue current COPD maintenance therapy but I prescribed steroids and abx to have available for AECB.  I reviewed his prior sleep study and the notes from Dr. Mccabe in the Sleep Clinic.  He says that he can't afford the CPAP machine and is asking for O2 to treat his nocturnal hypoxemia.  I asked him to return to sleep clinic for evaluation of possible mandibular advancing device or ways to get CPAP at lower cost. (He asked to see an alternate MD and I gave him Dr Lui's name).  I expressed concern that use of nocturnal O2 w/o CPAP could worsen nocturnal CO2 retention, but given level of hypoxemia I am willing to prescribe nocturnal O2.  Since travels a lot, he asked for portable system.  I introduced him to Brandon Early RN who will work with him to obtain appropriate system.  Khoa Handy MD

## 2018-06-18 ENCOUNTER — TELEPHONE (OUTPATIENT)
Dept: PULMONOLOGY | Facility: CLINIC | Age: 73
End: 2018-06-18

## 2018-06-18 NOTE — TELEPHONE ENCOUNTER
Dr Handy ordered portable oxygen for nocturnal use as patient travels frequently. Overnight was done with sleep study completed in Feb 2018. Order emailed to [employer].

## 2018-06-20 DIAGNOSIS — J44.9 COPD (CHRONIC OBSTRUCTIVE PULMONARY DISEASE) (H): Primary | ICD-10-CM

## 2018-06-21 DIAGNOSIS — M48.062 SPINAL STENOSIS OF LUMBAR REGION WITH NEUROGENIC CLAUDICATION: ICD-10-CM

## 2018-06-21 RX ORDER — HYDROCODONE BITARTRATE AND ACETAMINOPHEN 5; 325 MG/1; MG/1
1 TABLET ORAL EVERY 4 HOURS PRN
Qty: 180 TABLET | Refills: 0 | Status: SHIPPED | OUTPATIENT
Start: 2018-06-21 | End: 2018-08-27

## 2018-06-21 NOTE — TELEPHONE ENCOUNTER
Reason for Call:  Medication or medication refill:    Do you use a [facility]?  Name of the pharmacy and phone number for the current request:       [facility]  [address] [facility] [address]  WRITTEN PRESCRIPTION REQUESTED    Name of the medication requested: Hydrocodone    Other request: call once approved    Can we leave a detailed message on this number? YES    Phone number patient can be reached at: Home number on file [phone] (home)    Best Time: anytime    Call taken on 6/21/2018 at 9:00 AM by Chester Velazquez

## 2018-06-21 NOTE — TELEPHONE ENCOUNTER
Controlled Substance Refill Request for   HYDROcodone-acetaminophen (NORCO) 5-325 MG per tablet 180 tablet 0 5/7/2018       Problem List Complete:  Yes    Last Written Prescription Date:  05/07/2018  Last Fill Quantity: 180,   # refills: 0    Last Office Visit with [facility] primary care provider:05/07/2018     Clinic visit frequency required: Q 6  months     Future Office visit:   Next 5 appointments (look out 90 days)     Aug 27, 2018  9:30 AM CDT   [facility] Visit with Yaneli Dozier MD   [facility]    [address] [facility] [address]   [phone]                  Controlled substance agreement on file: Yes:  Date 05/26/2017.     Processing:  Patient will  in clinic   checked in past 6 months?  No, 10/10/2017

## 2018-06-22 ENCOUNTER — TRANSFERRED RECORDS (OUTPATIENT)
Dept: HEALTH INFORMATION MANAGEMENT | Facility: CLINIC | Age: 73
End: 2018-06-22

## 2018-06-27 DIAGNOSIS — I10 BENIGN ESSENTIAL HYPERTENSION: ICD-10-CM

## 2018-06-27 RX ORDER — AMLODIPINE BESYLATE 5 MG/1
5 TABLET ORAL DAILY
Qty: 90 TABLET | Refills: 2 | Status: SHIPPED | OUTPATIENT
Start: 2018-06-27 | End: 2019-03-18

## 2018-07-05 ENCOUNTER — TRANSFERRED RECORDS (OUTPATIENT)
Dept: HEALTH INFORMATION MANAGEMENT | Facility: CLINIC | Age: 73
End: 2018-07-05

## 2018-08-07 NOTE — TELEPHONE ENCOUNTER
carisoprodol (SOMA) 350 MG tablet (Discontinued) 30 tablet 0 4/29/2016 1/3/2018 No      Sig: TAKE 1 TABLET BY MOUTH 3 TIMES DAILY AND 1 TABLET AT BEDTIME       Last Written Prescription Date:  4-  Last Fill Quantity: 30,  # refills: 0   Last office visit: 5/7/2018 with prescribing provider:     Future Office Visit:   Next 5 appointments (look out 90 days)     Aug 27, 2018  9:30 AM CDT   [facility]    [address] [facility] [address]   [phone]                Drug not on the [facility], P or [facility] refill protocol or controlled substance

## 2018-08-09 RX ORDER — CARISOPRODOL 250 MG/1
TABLET ORAL
Start: 2018-08-09

## 2018-08-24 PROBLEM — I25.10 CORONARY ARTERY DISEASE INVOLVING NATIVE CORONARY ARTERY OF NATIVE HEART WITHOUT ANGINA PECTORIS: Status: RESOLVED | Noted: 2018-01-03 | Resolved: 2018-08-24

## 2018-08-27 ENCOUNTER — OFFICE VISIT (OUTPATIENT)
Dept: FAMILY MEDICINE | Facility: CLINIC | Age: 73
End: 2018-08-27
Payer: COMMERCIAL

## 2018-08-27 VITALS
SYSTOLIC BLOOD PRESSURE: 134 MMHG | OXYGEN SATURATION: 94 % | TEMPERATURE: 97.6 F | BODY MASS INDEX: 27.11 KG/M2 | DIASTOLIC BLOOD PRESSURE: 74 MMHG | HEART RATE: 60 BPM | HEIGHT: 69 IN | WEIGHT: 183 LBS

## 2018-08-27 DIAGNOSIS — G89.4 CHRONIC PAIN SYNDROME: ICD-10-CM

## 2018-08-27 DIAGNOSIS — I25.10 ATHEROSCLEROSIS OF NATIVE CORONARY ARTERY OF NATIVE HEART WITHOUT ANGINA PECTORIS: ICD-10-CM

## 2018-08-27 DIAGNOSIS — I10 ESSENTIAL HYPERTENSION, BENIGN: ICD-10-CM

## 2018-08-27 DIAGNOSIS — D47.2 MONOCLONAL PARAPROTEINEMIA: ICD-10-CM

## 2018-08-27 DIAGNOSIS — M48.062 SPINAL STENOSIS OF LUMBAR REGION WITH NEUROGENIC CLAUDICATION: ICD-10-CM

## 2018-08-27 DIAGNOSIS — D84.9 IMMUNODEFICIENCY (H): ICD-10-CM

## 2018-08-27 DIAGNOSIS — J44.9 CHRONIC OBSTRUCTIVE PULMONARY DISEASE, UNSPECIFIED COPD TYPE (H): Primary | ICD-10-CM

## 2018-08-27 DIAGNOSIS — G47.33 OSA (OBSTRUCTIVE SLEEP APNEA): ICD-10-CM

## 2018-08-27 DIAGNOSIS — E05.90 THYROTOXICOSIS WITHOUT THYROID STORM, UNSPECIFIED THYROTOXICOSIS TYPE: ICD-10-CM

## 2018-08-27 DIAGNOSIS — Z23 NEED FOR VACCINATION: ICD-10-CM

## 2018-08-27 LAB
ALBUMIN SERPL-MCNC: 3.6 G/DL (ref 3.4–5)
ALP SERPL-CCNC: 95 U/L (ref 40–150)
ALT SERPL W P-5'-P-CCNC: 30 U/L (ref 0–70)
AMPHETAMINES UR QL: NOT DETECTED NG/ML
ANION GAP SERPL CALCULATED.3IONS-SCNC: 6 MMOL/L (ref 3–14)
AST SERPL W P-5'-P-CCNC: 14 U/L (ref 0–45)
BARBITURATES UR QL SCN: NOT DETECTED NG/ML
BENZODIAZ UR QL SCN: ABNORMAL NG/ML
BILIRUB SERPL-MCNC: 0.5 MG/DL (ref 0.2–1.3)
BUN SERPL-MCNC: 13 MG/DL (ref 7–30)
BUPRENORPHINE UR QL: NOT DETECTED NG/ML
CALCIUM SERPL-MCNC: 9.2 MG/DL (ref 8.5–10.1)
CANNABINOIDS UR QL: NOT DETECTED NG/ML
CHLORIDE SERPL-SCNC: 106 MMOL/L (ref 94–109)
CO2 SERPL-SCNC: 31 MMOL/L (ref 20–32)
COCAINE UR QL SCN: NOT DETECTED NG/ML
CREAT SERPL-MCNC: 0.91 MG/DL (ref 0.66–1.25)
D-METHAMPHET UR QL: NOT DETECTED NG/ML
ERYTHROCYTE [DISTWIDTH] IN BLOOD BY AUTOMATED COUNT: 14.4 % (ref 10–15)
GFR SERPL CREATININE-BSD FRML MDRD: 82 ML/MIN/1.7M2
GLUCOSE SERPL-MCNC: 77 MG/DL (ref 70–99)
HCT VFR BLD AUTO: 43.9 % (ref 40–53)
HGB BLD-MCNC: 14.5 G/DL (ref 13.3–17.7)
MCH RBC QN AUTO: 30.2 PG (ref 26.5–33)
MCHC RBC AUTO-ENTMCNC: 33 G/DL (ref 31.5–36.5)
MCV RBC AUTO: 92 FL (ref 78–100)
METHADONE UR QL SCN: NOT DETECTED NG/ML
OPIATES UR QL SCN: ABNORMAL NG/ML
OXYCODONE UR QL SCN: NOT DETECTED NG/ML
PCP UR QL SCN: NOT DETECTED NG/ML
PLATELET # BLD AUTO: 166 10E9/L (ref 150–450)
POTASSIUM SERPL-SCNC: 4.8 MMOL/L (ref 3.4–5.3)
PROPOXYPH UR QL: NOT DETECTED NG/ML
PROT SERPL-MCNC: 6.8 G/DL (ref 6.8–8.8)
RBC # BLD AUTO: 4.8 10E12/L (ref 4.4–5.9)
SODIUM SERPL-SCNC: 143 MMOL/L (ref 133–144)
TRICYCLICS UR QL SCN: NOT DETECTED NG/ML
TSH SERPL DL<=0.005 MIU/L-ACNC: 0.59 MU/L (ref 0.4–4)
WBC # BLD AUTO: 6.8 10E9/L (ref 4–11)

## 2018-08-27 PROCEDURE — 90471 IMMUNIZATION ADMIN: CPT | Performed by: INTERNAL MEDICINE

## 2018-08-27 PROCEDURE — 99214 OFFICE O/P EST MOD 30 MIN: CPT | Performed by: INTERNAL MEDICINE

## 2018-08-27 PROCEDURE — 80053 COMPREHEN METABOLIC PANEL: CPT | Performed by: INTERNAL MEDICINE

## 2018-08-27 PROCEDURE — 36415 COLL VENOUS BLD VENIPUNCTURE: CPT | Performed by: INTERNAL MEDICINE

## 2018-08-27 PROCEDURE — 80306 DRUG TEST PRSMV INSTRMNT: CPT | Performed by: INTERNAL MEDICINE

## 2018-08-27 PROCEDURE — 90750 HZV VACC RECOMBINANT IM: CPT | Performed by: INTERNAL MEDICINE

## 2018-08-27 PROCEDURE — 00000402 ZZHCL STATISTIC TOTAL PROTEIN: Performed by: INTERNAL MEDICINE

## 2018-08-27 PROCEDURE — 2894A VOIDCORRECT: CPT | Performed by: INTERNAL MEDICINE

## 2018-08-27 PROCEDURE — 84165 PROTEIN E-PHORESIS SERUM: CPT | Performed by: INTERNAL MEDICINE

## 2018-08-27 PROCEDURE — 84443 ASSAY THYROID STIM HORMONE: CPT | Performed by: INTERNAL MEDICINE

## 2018-08-27 PROCEDURE — 85027 COMPLETE CBC AUTOMATED: CPT | Performed by: INTERNAL MEDICINE

## 2018-08-27 RX ORDER — HYDROCODONE BITARTRATE AND ACETAMINOPHEN 5; 325 MG/1; MG/1
1 TABLET ORAL EVERY 4 HOURS PRN
Qty: 180 TABLET | Refills: 0 | Status: SHIPPED | OUTPATIENT
Start: 2018-08-27 | End: 2018-10-15
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
## 2018-08-27 NOTE — MR AVS SNAPSHOT
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, unspecified thyrotoxicosis type        Atherosclerosis of native coronary artery of native heart without angina pectoris        Chronic pain syndrome        Essential hypertension, benign        Monoclonal paraproteinemia        BRENNEN (obstructive sleep apnea)        Spinal stenosis of lumbar region with neurogenic claudication           Follow-ups after your visit        Your next 10 appointments already scheduled     Dec 20, 2018 10:30 AM CST   SIX MINUTE WALK with UC PFL A, UC PFL B   [facility] Pulmonary Function Testing ([facility])    [address]  3rd Floor  [facility] [address] [phone]            Dec 20, 2018 11:30 AM CST   (Arrive by 11:15 AM)   Return Visit with Khoa Handy MD   [facility] ([facility])    [address]  [address] [place] [id number] [phone]              Who to contact     If you have questions or need follow up information about today's clinic visit or your schedule please contact [facility] directly at [phone].  Normal or non-critical lab and imaging results will be communicated to you by MyChart, letter or phone within 4 business days after the clinic has received the results. If you do not hear from us within 7 days, please contact the clinic through MyChart or phone. If you have a critical or abnormal lab result, we will notify you by phone as soon as possible.  Submit refill requests through [employer] or call your  "pharmacy and they will forward the refill request to us. Please allow 3 business days for your refill to be completed.          Additional Information About Your Visit        [facility] Information     [employer] gives you secure access to your electronic health record. If you see a primary care provider, you can also send messages to your care team and make appointments. If you have questions, please call your primary care clinic.  If you do not have a primary care provider, please call [phone] and they will assist you.        Care EveryWhere ID     This is your Care EveryWhere ID. This could be used by other organizations to access your [facility] medical records  [id number]        Your Vitals Were     Pulse Temperature Height Pulse Oximetry BMI (Body Mass Index)       60 97.6  F (36.4  C) (Oral) 5' 9\" (1.753 m) 94% 27.02 kg/m2        Blood Pressure from Last 3 Encounters:   08/27/18 134/74   06/14/18 154/76   05/07/18 128/68    Weight from Last 3 Encounters:   08/27/18 183 lb (83 kg)   06/14/18 185 lb (83.9 kg)   05/07/18 185 lb (83.9 kg)              We Performed the Following     CBC with platelets     Comprehensive metabolic panel     Drug Abuse Screen Panel 13, Urine (Pain Care Package)     Protein electrophoresis     TSH with free T4 reflex          Where to get your medicines      Some of these will need a paper prescription and others can be bought over the counter.  Ask your nurse if you have questions.     Bring a paper prescription for each of these medications     HYDROcodone-acetaminophen 5-325 MG per tablet         Information about OPIOIDS     PRESCRIPTION OPIOIDS: WHAT YOU NEED TO KNOW   We gave you an opioid (narcotic) pain medicine. It is important to manage your pain, but opioids are not always the best choice. You should first try all the other options your care team gave you. Take this medicine for as short a time (and as few doses) as possible.    Some activities can increase your pain, such " as bandage changes or therapy sessions. It may help to take your pain medicine 30 to 60 minutes before these activities. Reduce your stress by getting enough sleep, working on hobbies you enjoy and practicing relaxation or meditation. Talk to your care team about ways to manage your pain beyond prescription opioids.    These medicines have risks:    DO NOT drive when on new or higher doses of pain medicine. These medicines can affect your alertness and reaction times, and you could be arrested for driving under the influence (DUI). If you need to use opioids long-term, talk to your care team about driving.    DO NOT operate heavy machinery    DO NOT do any other dangerous activities while taking these medicines.    DO NOT drink any alcohol while taking these medicines.     If the opioid prescribed includes acetaminophen, DO NOT take with any other medicines that contain acetaminophen. Read all labels carefully. Look for the word  acetaminophen  or  Tylenol.  Ask your pharmacist if you have questions or are unsure.    You can get addicted to pain medicines, especially if you have a history of addiction (chemical, alcohol or substance dependence). Talk to your care team about ways to reduce this risk.    All opioids tend to cause constipation. Drink plenty of water and eat foods that have a lot of fiber, such as fruits, vegetables, prune juice, apple juice and high-fiber cereal. Take a laxative (Miralax, milk of magnesia, Colace, Senna) if you don t move your bowels at least every other day. Other side effects include upset stomach, sleepiness, dizziness, throwing up, tolerance (needing more of the medicine to have the same effect), physical dependence and slowed breathing.    Store your pills in a secure place, locked if possible. We will not replace any lost or stolen medicine. If you don t finish your medicine, please throw away (dispose) as directed by your pharmacist. The [place] Pollution [employer] has  more information about safe disposal: https://www.pca.[email or web address]/living-green/managing-unwanted-medications         Primary Care Provider Office Phone # Fax #    Yaneli Dozier [phone] [address]  [facility] [address]        Equal Access to Services     SHYAM RIVERA : Hadii aad ku hadasho Soomaali, waaxda luqadaha, qaybta kaalmada adeegyada, waxay janin hayaan adeeg khumm laOrikelsien . So [facility] [phone].    ATENCIÓN: Si habla español, tiene a ann disposición servicios gratuitos de asistencia lingüística. Jeanneame al [phone].    We comply with applicable federal civil rights laws and [place] laws. We do not discriminate on the basis of race, color, national origin, age, disability, sex, sexual orientation, or gender identity.            Thank you!     Thank you for choosing [facility]  for your care. Our goal is always to provide you with excellent care. Hearing back from our patients is one way we can continue to improve our services. Please take a few minutes to complete the written survey that you may receive in the mail after your visit with us. Thank you!             Your Updated Medication List - Protect others around you: Learn how to safely use, store and throw away your medicines at www.disposemymeds.org.          This list is accurate as of 8/27/18  9:37 AM.  Always use your most recent med list.                   Brand Name Dispense Instructions for use Diagnosis    alfuzosin 10 MG 24 hr tablet    UROXATRAL    90 tablet    TAKE 1 TABLET (10 MG) BY MOUTH DAILY AFTER A MEAL    Benign non-nodular prostatic hyperplasia with lower urinary tract symptoms       amLODIPine 5 MG tablet    NORVASC    90 tablet    Take 1 tablet (5 mg) by mouth daily    Benign essential hypertension       aspirin 81 MG tablet      Take 1 tablet by mouth 2 times daily        budesonide-formoterol 160-4.5 MCG/ACT Inhaler    SYMBICORT    3 Inhaler    Inhale 2 puffs into the lungs 2  times daily    COPD exacerbation (H)       fish oil-omega-3 fatty acids 1000 MG capsule      take one tablet twice daily        HYDROcodone-acetaminophen 5-325 MG per tablet    NORCO    180 tablet    Take 1 tablet by mouth every 4 hours as needed    Spinal stenosis of lumbar region with neurogenic claudication       INCRUSE ELLIPTA 62.5 MCG/INH inhaler   Generic drug:  umeclidinium     1 Inhaler    INHALE 1 PUFF INTO THE LUNGS DAILY    COPD exacerbation (H)       levalbuterol 0.31 MG/3ML neb solution    XOPENEX    720 mL    INHALE 1 VIAL (3ML) BY NEBULIZATION EVERY 4 HOURS AS NEEDED FOR WHEEZING OR SHORTNESS OF BREATH.    COPD exacerbation (H)       lidocaine 5 % Patch    LIDODERM    60 patch    Apply up to 3 patches to painful area at once for up to 12 h within a 24 h period.  Remove after 12 hours.    Closed fracture of seven ribs of left side, initial encounter       lisinopril 40 MG tablet    PRINIVIL/ZESTRIL    90 tablet    TAKE 1 TABLET (40 MG) BY MOUTH DAILY    Benign essential hypertension       methimazole 5 MG tablet    TAPAZOLE     Take 5 mg by mouth Take one tablet daily on Monday and Thursday        metoprolol succinate 25 MG 24 hr tablet    TOPROL-XL    90 tablet    Take 1 tablet (25 mg) by mouth daily    Benign essential hypertension       MULTIVITAMIN PO      Take by mouth daily        nitroGLYcerin 0.4 MG sublingual tablet    NITROSTAT    30 tablet    Place 1 tablet (0.4 mg) under the tongue See Admin Instructions for chest pain    Coronary artery disease involving native coronary artery of native heart without angina pectoris       order for DME     1 each    Equipment being ordered: flutter valve Incentive spirometer    Pneumonia of right upper lobe due to infectious organism (H), Closed fracture of seven ribs of left side, initial encounter       order for DME     1 Device    Equipment being ordered: Oxygen Please provide O2 continuous via NC from PORTABLE O2 concentrator for overnight usage at 2  LPM    Chronic bronchitis, unspecified chronic bronchitis type (H), BRENNEN (obstructive sleep apnea), Nocturnal hypoxemia       PROAIR  (90 Base) MCG/ACT inhaler   Generic drug:  albuterol     8.5 Inhaler    INHALE 2 PUFFS INTO THE LUNGS EVERY 6 HOURS AS NEEDED FOR SHORTNESS OF BREATH / DYSPNEA OR WHEEZING    COPD exacerbation (H)       roflumilast 500 MCG Tabs tablet    DALIRESP    31 tablet    Take 1 tablet (500 mcg) by mouth daily    Chronic obstructive pulmonary disease, unspecified COPD type (H)       rosuvastatin 10 MG tablet    CRESTOR    90 tablet    TAKE 1 TABLET (10 MG) BY MOUTH DAILY    Hyperlipidemia LDL goal <100

## 2018-08-27 NOTE — NURSING NOTE
Screening Questionnaire for Adult Immunization    Are you sick today?   No   Do you have allergies to medications, food, a vaccine component or latex?   No   Have you ever had a serious reaction after receiving a vaccination?   No   Do you have a long-term health problem with heart disease, lung disease, asthma, kidney disease, metabolic disease (e.g. diabetes), anemia, or other blood disorder?   No   Do you have cancer, leukemia, HIV/AIDS, or any other immune system problem?   No   In the past 3 months, have you taken medications that affect  your immune system, such as prednisone, other steroids, or anticancer drugs; drugs for the treatment of rheumatoid arthritis, Crohn s disease, or psoriasis; or have you had radiation treatments?   No   Have you had a seizure, or a brain or other nervous system problem?   No   During the past year, have you received a transfusion of blood or blood     products, or been given immune (gamma) globulin or antiviral drug?   No   For women: Are you pregnant or is there a chance you could become        pregnant during the next month?   No   Have you received any vaccinations in the past 4 weeks?   No     Immunization questionnaire answers were all negative.        Per orders of Dr. Dozier, injection of Shingrix given by Bita Russo. Patient instructed to remain in clinic for 15 minutes afterwards, and to report any adverse reaction to me immediately.       Screening performed by Bita Russo on 8/27/2018 at 10:55 AM.

## 2018-08-28 LAB
ALBUMIN SERPL ELPH-MCNC: 4 G/DL (ref 3.7–5.1)
ALPHA1 GLOB SERPL ELPH-MCNC: 0.3 G/DL (ref 0.2–0.4)
ALPHA2 GLOB SERPL ELPH-MCNC: 0.8 G/DL (ref 0.5–0.9)
B-GLOBULIN SERPL ELPH-MCNC: 0.8 G/DL (ref 0.6–1)
GAMMA GLOB SERPL ELPH-MCNC: 0.7 G/DL (ref 0.7–1.6)
M PROTEIN SERPL ELPH-MCNC: 0 G/DL
PROT PATTERN SERPL ELPH-IMP: NORMAL

## 2018-09-19 ENCOUNTER — ALLIED HEALTH/NURSE VISIT (OUTPATIENT)
Dept: NURSING | Facility: CLINIC | Age: 73
End: 2018-09-19
Payer: COMMERCIAL

## 2018-09-19 DIAGNOSIS — Z23 NEED FOR PROPHYLACTIC VACCINATION AND INOCULATION AGAINST INFLUENZA: Primary | ICD-10-CM

## 2018-09-19 PROCEDURE — 99207 ZZC NO CHARGE NURSE ONLY: CPT

## 2018-09-19 PROCEDURE — 90471 IMMUNIZATION ADMIN: CPT

## 2018-09-19 PROCEDURE — 90662 IIV NO PRSV INCREASED AG IM: CPT

## 2018-09-19 NOTE — PROGRESS NOTES

## 2018-09-19 NOTE — MR AVS SNAPSHOT
After Visit Summary   9/19/2018    Harrison Thomas    MRN: [id number]           Patient Information     Date Of Birth          1945        Visit Information        Provider Department      9/19/2018 10:00 AM CS YORK NURSE [facility]        Today's Diagnoses     Need for prophylactic vaccination and inoculation against influenza    -  1       Follow-ups after your visit        Your next 10 appointments already scheduled     Sep 19, 2018 10:00 AM CDT   Nurse Only with CS YORK NURSE   [facility] [place] ([facility])    [address]  [place] [address]   [phone]            Dec 20, 2018 10:30 AM CST   SIX MINUTE WALK with UC PFL A, UC PFL B   [facility] Pulmonary Function Testing ([facility])    [address]  3rd Floor  [facility] [address] [phone]            Dec 20, 2018 11:30 AM CST   (Arrive by 11:15 AM)   Return Visit with Khoa Handy MD   [facility] ([facility])    [address]  [address]  [facility] [address] [phone]            Dec 27, 2018  9:30 AM CST   NM SH CV MPI MULT RST ST 1 DAY with SCINM1   [facility] Nuclear Medicine ([facility])    [address]  [address]  [facility] [address] [phone]           How do I prepare for my exam? (Food and drink instructions) Day 1 & Day 2: Stop all caffeine 12 hours before the test. This includes coffee, tea, soda pop, chocolate and certain medicines (such as Anacin, Excedrin and NoDoz). Also avoid decaf coffee and tea, as these contain small amounts of caffeine. Stop eating 4 hours before the test. You may drink water.  How do I prepare for my exam? (Other instructions) You may need to stop some medicines before the test. Follow your doctor s orders. Day 1 & Day 2: *If you take a beta blocker: Do not take your beta-blocker on  the day before your test, unless specifically told to by your doctor. And do not take it on the day of your test. Bring it with you to take after the test.  *If you take Aggrenox or dipyridamole (Persantine, Permole), stop taking it 48 hours before your test. *If you take Viagra, Cialis or Levitra, stop taking it 48 hours before your test. *If you take theophylline or aminophylline, stop taking it 12 hours before your test.  For patients with diabetes: *If you take insulin, call your diabetes care team. Ask if you should take a 1/2 dose the morning of your test. *If you take diabetes medicine by mouth, don t take it on the morning of your test. Bring it with you to take after the test. (If you have questions, call your diabetes care team.)  *Do not take nitrates on the day of your test. Do not wear your Nitro-Patch. *No alcohol, smoking or other tobacco for 12 hours before the test.  What should I wear: Please wear a loose two-piece outfit. If you will have an exercise test, bring rubber-soled walking shoes.  How long does the exam take: *This test can take 1-2 days.* ONE day exam: Allow 3-4 hours for test. IF TWO day exam: Allow 30-60 minutes PER day for test.  What should I bring: Please bring a list of your medicines (including vitamins, minerals and over-the-counter drugs). Leave your valuables at home.  Do I need a :  No  is needed.  What do I need to tell my doctor? When you arrive, please tell us if you: * Have diabetes * Are breastfeeding * May be pregnant * Have a pacemaker of ICD (implantable defibrillator).  What should I do after the exam: No restrictions, You may resume normal activities.  What is this test: Your doctor has ordered a nuclear stress test to check how well blood is flowing through your heart. You will either exercise or take a medicine that mimics exercise; we will watch your heart.  Who should I call with questions: If you have any questions, please call the Imaging  Department where you will have your exam. Directions, parking instructions, and other information is available on our website, [email or web address].            Feb 21, 2019  9:30 AM CST   Office Visit with Yaneli Dozier MD   [facility]    [address]  [facility] [address] [phone]           Bring a current list of meds and any records pertaining to this visit. For Physicals, please bring immunization records and any forms needing to be filled out. Please arrive 10 minutes early to complete paperwork.              Who to contact     If you have questions or need follow up information about today's clinic visit or your schedule please contact [facility] directly at [phone].  Normal or non-critical lab and imaging results will be communicated to you by MyChart, letter or phone within 4 business days after the clinic has received the results. If you do not hear from us within 7 days, please contact the clinic through [employer] or phone. If you have a critical or abnormal lab result, we will notify you by phone as soon as possible.  Submit refill requests through [employer] or call your pharmacy and they will forward the refill request to us. Please allow 3 business days for your refill to be completed.          Additional Information About Your Visit        MyChart Information     [employer] gives you secure access to your electronic health record. If you see a primary care provider, you can also send messages to your care team and make appointments. If you have questions, please call your primary care clinic.  If you do not have a primary care provider, please call [phone] and they will assist you.        Care EveryWhere ID     This is your Care EveryWhere ID. This could be used by other organizations to access your [place] medical records  [id number]         Blood Pressure from Last 3 Encounters:   08/27/18 134/74   06/14/18 154/76   05/07/18 128/68     Weight from Last 3 Encounters:   08/27/18 183 lb (83 kg)   06/14/18 185 lb (83.9 kg)   05/07/18 185 lb (83.9 kg)              We Performed the Following     FLU VACCINE, INCREASED ANTIGEN, PRESV FREE, AGE 65+ [27537]     Vaccine Administration, Initial [29075]        Primary Care Provider Office Phone # Fax #    Yaneli MD Tasia [phone] [address] [address]        Equal Access to Services     [facility]: Hadii aad ku hadasho Soomaali, waaxda luqadaha, qaybta kaalmada adeegyayuri, connie silva . So [facility] [phone].    ATENCIÓN: Si habla español, tiene a ann disposición servicios gratuitos de asistencia lingüística. [facility] [phone].    We comply with applicable federal civil rights laws and [place] laws. We do not discriminate on the basis of race, color, national origin, age, disability, sex, sexual orientation, or gender identity.            Thank you!     Thank you for choosing [facility]  for your care. Our goal is always to provide you with excellent care. Hearing back from our patients is one way we can continue to improve our services. Please take a few minutes to complete the written survey that you may receive in the mail after your visit with us. Thank you!             Your Updated Medication List - Protect others around you: Learn how to safely use, store and throw away your medicines at www.disposemymeds.org.          This list is accurate as of 9/19/18  9:48 AM.  Always use your most recent med list.                   Brand Name Dispense Instructions for use Diagnosis    alfuzosin 10 MG 24 hr tablet    UROXATRAL    90 tablet    TAKE 1 TABLET (10 MG) BY MOUTH DAILY AFTER A MEAL    Benign non-nodular prostatic hyperplasia with lower urinary tract symptoms       amLODIPine 5 MG tablet    NORVASC    90 tablet    Take 1 tablet (5 mg) by mouth daily    Benign essential hypertension       aspirin 81 MG tablet       Take 1 tablet by mouth 2 times daily        budesonide-formoterol 160-4.5 MCG/ACT Inhaler    SYMBICORT    3 Inhaler    Inhale 2 puffs into the lungs 2 times daily    COPD exacerbation (H)       fish oil-omega-3 fatty acids 1000 MG capsule      take one tablet twice daily        HYDROcodone-acetaminophen 5-325 MG per tablet    NORCO    180 tablet    Take 1 tablet by mouth every 4 hours as needed    Spinal stenosis of lumbar region with neurogenic claudication       INCRUSE ELLIPTA 62.5 MCG/INH inhaler   Generic drug:  umeclidinium     1 Inhaler    INHALE 1 PUFF INTO THE LUNGS DAILY    COPD exacerbation (H)       levalbuterol 0.31 MG/3ML neb solution    XOPENEX    720 mL    INHALE 1 VIAL (3ML) BY NEBULIZATION EVERY 4 HOURS AS NEEDED FOR WHEEZING OR SHORTNESS OF BREATH.    COPD exacerbation (H)       lidocaine 5 % Patch    LIDODERM    60 patch    Apply up to 3 patches to painful area at once for up to 12 h within a 24 h period.  Remove after 12 hours.    Closed fracture of seven ribs of left side, initial encounter       lisinopril 40 MG tablet    PRINIVIL/ZESTRIL    90 tablet    TAKE 1 TABLET (40 MG) BY MOUTH DAILY    Benign essential hypertension       methimazole 5 MG tablet    TAPAZOLE     Take 5 mg by mouth Take one tablet daily on Monday and Thursday        metoprolol succinate 25 MG 24 hr tablet    TOPROL-XL    90 tablet    Take 1 tablet (25 mg) by mouth daily    Benign essential hypertension       MULTIVITAMIN PO      Take by mouth daily        nitroGLYcerin 0.4 MG sublingual tablet    NITROSTAT    30 tablet    Place 1 tablet (0.4 mg) under the tongue See Admin Instructions for chest pain    Coronary artery disease involving native coronary artery of native heart without angina pectoris       order for DME     1 each    Equipment being ordered: flutter valve Incentive spirometer    Pneumonia of right upper lobe due to infectious organism (H), Closed fracture of seven ribs of left side, initial encounter        order for DME     1 Device    Equipment being ordered: Oxygen Please provide O2 continuous via NC from PORTABLE O2 concentrator for overnight usage at 2 LPM    Chronic bronchitis, unspecified chronic bronchitis type (H), BRENNEN (obstructive sleep apnea), Nocturnal hypoxemia       PROAIR  (90 Base) MCG/ACT inhaler   Generic drug:  albuterol     8.5 Inhaler    INHALE 2 PUFFS INTO THE LUNGS EVERY 6 HOURS AS NEEDED FOR SHORTNESS OF BREATH / DYSPNEA OR WHEEZING    COPD exacerbation (H)       roflumilast 500 MCG Tabs tablet    DALIRESP    31 tablet    Take 1 tablet (500 mcg) by mouth daily    Chronic obstructive pulmonary disease, unspecified COPD type (H)       rosuvastatin 10 MG tablet    CRESTOR    90 tablet    TAKE 1 TABLET (10 MG) BY MOUTH DAILY    Hyperlipidemia LDL goal <100

## 2018-10-15 DIAGNOSIS — M48.062 SPINAL STENOSIS OF LUMBAR REGION WITH NEUROGENIC CLAUDICATION: ICD-10-CM

## 2018-10-15 DIAGNOSIS — G89.4 CHRONIC PAIN SYNDROME: ICD-10-CM

## 2018-10-15 NOTE — TELEPHONE ENCOUNTER
Reason for Call:  Medication or medication refill:    Do you use a [facility]?  Name of the pharmacy and phone number for the current request:     [id number] - [place], [place] - [address] [place] [address]  WRITTEN PRESCRIPTION REQUESTED  [facility] [place] - [place], [place] - [address] [place] [address]    Name of the medication requested: HYDROcodone-acetaminophen (NORCO) 5-325 MG per tablet    Other request: pt needs refill to  at [facility]. Please advise    Can we leave a detailed message on this number? YES    Phone number patient can be reached at: Home number on file [phone] (home)    Best Time: any    Call taken on 10/15/2018 at 1:40 PM by Micah Louise

## 2018-10-15 NOTE — TELEPHONE ENCOUNTER
Controlled Substance Refill Request for Norco  Problem List Complete:  No     PROVIDER TO CONSIDER COMPLETION OF PROBLEM LIST AND OVERVIEW/CONTROLLED SUBSTANCE AGREEMENT    Last Written Prescription Date:  08/27/18  Last Fill Quantity: 180,   # refills: 0    Last Office Visit with [facility] primary care provider: 08/27/18Future Office visit: 02/21/19    Controlled substance agreement on file: Yes:  Date 01/14/13.     Processing:  Patient will  in clinic   checked in past 3 months?  Yes 10/10/17    Bita Hernandez MA

## 2018-10-17 RX ORDER — HYDROCODONE BITARTRATE AND ACETAMINOPHEN 5; 325 MG/1; MG/1
1 TABLET ORAL EVERY 4 HOURS PRN
Qty: 180 TABLET | Refills: 0 | Status: SHIPPED | OUTPATIENT
Start: 2018-10-17 | End: 2018-12-17

## 2018-10-17 NOTE — TELEPHONE ENCOUNTER
Pt received 7 tabs of oxycodone/acet 5-325 from an Lexis Chowdhury on 7/5/18.  Pt was in ED at this point in time.   All other  fills through Dr. Tasia Rojo RN

## 2018-10-19 ENCOUNTER — TELEPHONE (OUTPATIENT)
Dept: FAMILY MEDICINE | Facility: CLINIC | Age: 73
End: 2018-10-19

## 2018-10-19 DIAGNOSIS — J44.1 COPD EXACERBATION (H): ICD-10-CM

## 2018-10-19 RX ORDER — ALBUTEROL SULFATE 90 UG/1
AEROSOL, METERED RESPIRATORY (INHALATION)
Qty: 8.5 INHALER | Refills: 3 | Status: CANCELLED | OUTPATIENT
Start: 2018-10-19

## 2018-10-19 NOTE — TELEPHONE ENCOUNTER
Health Screening form completed and faxed.  Copy in  [employer].  Did not have waist circumference

## 2018-10-19 NOTE — TELEPHONE ENCOUNTER
PROAIR  (90 BASE) MCG/ACT inhaler  Last Written Prescription Date:  3/27/2018  Last Fill Quantity: 8.5 inhaler,  # refills: 3   Last office visit: 8/27/2018 with prescribing provider:  CLAUS Bennett Office Visit:      Routing refill request to provider for review/approval because:  Drug not on the [facility], [facility] or  Health refill protocol or controlled substance

## 2018-10-19 NOTE — TELEPHONE ENCOUNTER
Reason for Call:  Medication or medication refill:    Do you use a [facility]?  Name of the pharmacy and phone number for the current request:       [id number], [place] - [address] [place] [address]        Name of the medication requested:   PROAIR  (90 BASE) MCG/ACT inhaler 8.5 Inhaler 3 3/27/2018  No   Sig: INHALE 2 PUFFS INTO THE LUNGS EVERY 6 HOURS AS NEEDED FOR SHORTNESS OF BREATH / DYSPNEA OR WHEEZING   Class: E-Prescribe     And Carisoprodol 250 mg      Other request: Pt stopped in and dropped off some forms for Dozier     Can we leave a detailed message on this number? YES    Phone number patient can be reached at: Cell number on file:    Telephone Information:   Mobile [phone]       Best Time: ANy    Call taken on 10/19/2018 at 10:00 AM by Janee Rowland

## 2018-10-22 ENCOUNTER — TELEPHONE (OUTPATIENT)
Dept: FAMILY MEDICINE | Facility: CLINIC | Age: 73
End: 2018-10-22

## 2018-10-22 DIAGNOSIS — M47.816 LUMBAR ARTHROPATHY: Primary | ICD-10-CM

## 2018-10-22 RX ORDER — ALBUTEROL SULFATE 90 UG/1
AEROSOL, METERED RESPIRATORY (INHALATION)
Qty: 8.5 INHALER | Refills: 3 | Status: SHIPPED | OUTPATIENT
Start: 2018-10-22 | End: 2019-08-02

## 2018-10-22 NOTE — TELEPHONE ENCOUNTER
"Requested Prescriptions   Pending Prescriptions Disp Refills     albuterol (PROAIR HFA) 108 (90 Base) MCG/ACT inhaler 8.5 Inhaler 3    Asthma Maintenance Inhalers - Anticholinergics Passed    10/19/2018  3:13 PM       Passed - Patient is age 12 years or older       Passed - Recent (12 mo) or future (30 days) visit within the authorizing provider's specialty    Patient had office visit in the last 12 months or has a visit in the next 30 days with authorizing provider or within the authorizing provider's specialty.  See \"Patient Info\" tab in inbasket, or \"Choose Columns\" in Meds & Orders section of the refill encounter.              Prescription approved per [facility] Refill Protocol.  Zaria HAYS RN    "

## 2018-10-23 NOTE — TELEPHONE ENCOUNTER
Carisoprodol      Last Written Prescription Date:  unk  Last Fill Quantity: unk,   # refills: unk  Last Office Visit: 08/27/18  Future Office visit:       Routing refill request to provider for review/approval because:  Drug not active on patient's medication list    Bita Hernandez MA

## 2018-10-23 NOTE — TELEPHONE ENCOUNTER
Medication Detail         Disp Refills Start End JESSICA     carisoprodol (SOMA) 350 MG tablet (Discontinued) 30 tablet 0 4/29/2016 1/3/2018 No     Sig: TAKE 1 TABLET BY MOUTH 3 TIMES DAILY AND 1 TABLET AT BEDTIME     Class: Local Print     Notes to Pharmacy: This request is for a new prescription for a controlled substance as required by Federal/State law..     Reason for Discontinue: Stopped by Patient     Order: [id number]

## 2018-10-23 NOTE — TELEPHONE ENCOUNTER
Parso message sent to patient to verify if he is requesting this Rx as it is not on active med list, and if yes, to verify how often he is currently taking     Zaria HAYS RN

## 2018-10-29 RX ORDER — CARISOPRODOL 250 MG/1
TABLET ORAL
Qty: 120 TABLET | Refills: 1 | Status: SHIPPED | OUTPATIENT
Start: 2018-10-29 | End: 2019-05-17

## 2018-10-29 NOTE — TELEPHONE ENCOUNTER
Per Alfredo correspondence with patient, he reports taking Soma and is requesting a refill:     Routing refill request to provider to review  Zaria HAYS RN        To: ALFREDO DEFAULT POOL      From: Harrison Thomas      Created: 10/25/2018 7:30 AM        *-*-*This message has not been handled.*-*-*    I do not take it often unless I could not sleep i'm at year end with no copay so i thought i'd fill it.  ----- Message -----  From: Zaria MCKENNA  Sent: 10/23/2018  6:52 PM CDT  To: Harrison Thomas  Subject: Medication Request    Leonel Terry,     We received a refill request from your pharmacy asking for Carisprodol (Soma). This medication is not currently on your active medication list. We wanted to check to verify if you are requesting this medication, or if it was an automated request from the pharmacy.     If you did request this medication, how often are you currently taking it?    Please let us know    Thank you,   Zaria HAYS RN

## 2018-10-30 ENCOUNTER — TELEPHONE (OUTPATIENT)
Dept: FAMILY MEDICINE | Facility: CLINIC | Age: 73
End: 2018-10-30

## 2018-10-30 NOTE — TELEPHONE ENCOUNTER
Prior Authorization Retail Medication Request    Medication/Dose: Carisoprodol 250 mg  ICD code (if different than what is on RX):  M47.816  Previously Tried and Failed:  Cyclobenzaprine, Tizanidine  Rationale:  Flexeril did not help with patient's pain management of occipital neuralgia    Insurance Name:  [facility]  Insurance ID:  [id number]       Pharmacy Information (if different than what is on RX)  [id number]  Phone:  [phone]

## 2018-10-31 NOTE — TELEPHONE ENCOUNTER
Central Prior Authorization Team   Phone: [phone]    PA Initiation    Medication: Carisoprodol 250 mg  Insurance Company: [employer] - Phone [phone] Fax [phone]  Pharmacy [facility]illing the Rx[id number] [address] [place] [address]  Filling Pharmacy Phone: [phone]  Filling Pharmacy Fax: [phone]  Start Date: 10/31/2018

## 2018-11-01 ENCOUNTER — TRANSFERRED RECORDS (OUTPATIENT)
Dept: HEALTH INFORMATION MANAGEMENT | Facility: CLINIC | Age: 73
End: 2018-11-01

## 2018-11-01 NOTE — TELEPHONE ENCOUNTER
Prior Authorization Approval    Authorization Effective Date: 10/1/2018  Authorization Expiration Date: 10/31/2019  Medication: Carisoprodol 250 mg-APPROVED  Approved Dose/Quantity:    Reference #: [id number]   Insurance Company: [employer] - Phone [phone] Fax [phone]  Expected CoPay:       CoPay Card Available:      Foundation Assistance Needed:    Which Pharmacy is filling the prescription (Not needed for infusion/clinic administered): [id number] - [place], [place] - [address] [place] [address]  Pharmacy Notified: Yes  Patient Notified: Yes

## 2018-11-13 ENCOUNTER — TELEPHONE (OUTPATIENT)
Dept: FAMILY MEDICINE | Facility: CLINIC | Age: 73
End: 2018-11-13

## 2018-11-13 NOTE — TELEPHONE ENCOUNTER
Patient informed that last Shingrix was 8-29-18.  He will call his insurance company to make sure it is covered and then call back to schedule an appointment.  Brittany Cotto CMA

## 2018-11-13 NOTE — TELEPHONE ENCOUNTER
Reason for Call:  Vaccine Question    Detailed comments: patient said he got the 1st Shingrix Shot in Sept or  August, not seeing that on his appt. Please verify and if so call to schedule  For his 2nd Shot    Phone Number Patient can be reached at: Work number on file:  [phone] (work)    Best Time: anytime    Can we leave a detailed message on this number? YES    Call taken on 11/13/2018 at 9:44 AM by Chester Velazquez

## 2018-12-03 ENCOUNTER — APPOINTMENT (OUTPATIENT)
Age: 73
Setting detail: DERMATOLOGY
End: 2018-12-18

## 2018-12-03 DIAGNOSIS — D22 MELANOCYTIC NEVI: ICD-10-CM

## 2018-12-03 DIAGNOSIS — L82.1 OTHER SEBORRHEIC KERATOSIS: ICD-10-CM

## 2018-12-03 DIAGNOSIS — L81.4 OTHER MELANIN HYPERPIGMENTATION: ICD-10-CM

## 2018-12-03 DIAGNOSIS — L70.8 OTHER ACNE: ICD-10-CM

## 2018-12-03 PROBLEM — D22.9 MELANOCYTIC NEVI, UNSPECIFIED: Status: ACTIVE | Noted: 2018-12-03

## 2018-12-03 PROCEDURE — OTHER MIPS QUALITY: OTHER

## 2018-12-03 PROCEDURE — 99213 OFFICE O/P EST LOW 20 MIN: CPT

## 2018-12-03 PROCEDURE — OTHER COUNSELING: OTHER

## 2018-12-03 PROCEDURE — OTHER EXTRACTIONS: OTHER

## 2018-12-03 ASSESSMENT — LOCATION DETAILED DESCRIPTION DERM: LOCATION DETAILED: RIGHT SUPERIOR LATERAL UPPER BACK

## 2018-12-03 ASSESSMENT — LOCATION SIMPLE DESCRIPTION DERM: LOCATION SIMPLE: RIGHT UPPER BACK

## 2018-12-03 ASSESSMENT — LOCATION ZONE DERM: LOCATION ZONE: TRUNK

## 2018-12-03 NOTE — PROCEDURE: EXTRACTIONS
Detail Level: Detailed
Render Number Of Lesions Treated: no
Acne Type: Comedonal Lesions
Consent was obtained and risks were reviewed including but not limited to scarring, infection, bleeding, scabbing, incomplete removal, and allergy to anesthesia.
Post-Care Instructions: I reviewed with the patient in detail post-care instructions. Patient is to keep the treatment areas dry overnight, and then apply bacitracin twice daily until healed. Patient may apply hydrogen peroxide soaks to remove any crusting.
Prep Text (Optional): Prior to removal the treatment areas were prepped in the usual fashion.
Extraction Method: cotton-tipped applicators and gentle pressure

## 2018-12-03 NOTE — PROCEDURE: MIPS QUALITY
Detail Level: Detailed
Quality 226: Preventive Care And Screening: Tobacco Use: Screening And Cessation Intervention: Patient screened for tobacco and is an ex-smoker
Quality 110: Preventive Care And Screening: Influenza Immunization: Influenza Immunization previously received during influenza season
Quality 130: Documentation Of Current Medications In The Medical Record: Current Medications Documented
Quality 431: Preventive Care And Screening: Unhealthy Alcohol Use - Screening: Patient screened for unhealthy alcohol use using a single question and scores less than 2 times per year
Quality 131: Pain Assessment And Follow-Up: Pain assessment using a standardized tool is documented as negative, no follow-up plan required

## 2018-12-17 DIAGNOSIS — M48.062 SPINAL STENOSIS OF LUMBAR REGION WITH NEUROGENIC CLAUDICATION: ICD-10-CM

## 2018-12-17 RX ORDER — HYDROCODONE BITARTRATE AND ACETAMINOPHEN 5; 325 MG/1; MG/1
1 TABLET ORAL EVERY 4 HOURS PRN
Qty: 180 TABLET | Refills: 0 | Status: SHIPPED | OUTPATIENT
Start: 2018-12-17 | End: 2019-02-18

## 2018-12-17 NOTE — TELEPHONE ENCOUNTER
PROVIDER TO CONSIDER COMPLETION OF PROBLEM LIST AND OVERVIEW/CONTROLLED SUBSTANCE AGREEMENT    Last Written Prescription Date:  10-17-18  Last Fill Quantity: 180 ,   # refills: 0    Last Office Visit with G primary care provider: 8-27-18    Future Office visit:   Next 5 appointments (look out 90 days)    Feb 21, 2019  9:30 AM CST  Office Visit with Yaneli Dozier MD  [facility] [address] [facility] [address] [phone]          Controlled substance agreement on file: No.     Processing:  Patient will  in clinic   checked in past 3 months?  Yes 10/17/18 ES

## 2018-12-17 NOTE — TELEPHONE ENCOUNTER
Reason for Call:  Other prescription    Detailed comments: Requesting refill on Hydrocodone for , please call when Ready    Phone Number Patient can be reached at: Home number on file [phone] (home)    Best Time: when available    Can we leave a detailed message on this number? YES    Call taken on 12/17/2018 at 9:47 AM by Mary Nunes

## 2018-12-21 ENCOUNTER — HOSPITAL ENCOUNTER (OUTPATIENT)
Dept: CARDIOLOGY | Facility: CLINIC | Age: 73
Setting detail: NUCLEAR MEDICINE
Discharge: HOME | End: 2018-12-21
Attending: NURSE PRACTITIONER | Admitting: NURSE PRACTITIONER
Payer: COMMERCIAL

## 2018-12-21 DIAGNOSIS — I25.10 CORONARY ARTERY DISEASE INVOLVING NATIVE CORONARY ARTERY OF NATIVE HEART WITHOUT ANGINA PECTORIS: ICD-10-CM

## 2018-12-21 PROCEDURE — 93018 CV STRESS TEST I&R ONLY: CPT | Performed by: INTERNAL MEDICINE

## 2018-12-21 PROCEDURE — 78452 HT MUSCLE IMAGE SPECT MULT: CPT

## 2018-12-21 PROCEDURE — 34300033 ZZH RX 343: Performed by: NURSE PRACTITIONER

## 2018-12-21 PROCEDURE — 93016 CV STRESS TEST SUPVJ ONLY: CPT | Performed by: INTERNAL MEDICINE

## 2018-12-21 PROCEDURE — 78452 HT MUSCLE IMAGE SPECT MULT: CPT | Mod: 26 | Performed by: INTERNAL MEDICINE

## 2018-12-21 PROCEDURE — 25000128 H RX IP 250 OP 636: Performed by: INTERNAL MEDICINE

## 2018-12-21 PROCEDURE — A9502 TC99M TETROFOSMIN: HCPCS | Performed by: NURSE PRACTITIONER

## 2018-12-21 RX ORDER — REGADENOSON 0.08 MG/ML
0.4 INJECTION, SOLUTION INTRAVENOUS ONCE
Status: COMPLETED | OUTPATIENT
Start: 2018-12-21 | End: 2018-12-21

## 2018-12-21 RX ORDER — AMINOPHYLLINE 25 MG/ML
50-100 INJECTION, SOLUTION INTRAVENOUS
Status: DISCONTINUED | OUTPATIENT
Start: 2018-12-21 | End: 2018-12-22 | Stop reason: HOSPADM

## 2018-12-21 RX ORDER — ACYCLOVIR 200 MG/1
0-1 CAPSULE ORAL
Status: DISCONTINUED | OUTPATIENT
Start: 2018-12-21 | End: 2018-12-22 | Stop reason: HOSPADM

## 2018-12-21 RX ORDER — ALBUTEROL SULFATE 90 UG/1
2 AEROSOL, METERED RESPIRATORY (INHALATION) EVERY 5 MIN PRN
Status: DISCONTINUED | OUTPATIENT
Start: 2018-12-21 | End: 2018-12-22 | Stop reason: HOSPADM

## 2018-12-21 RX ADMIN — TETROFOSMIN 9.95 MCI.: 1.38 INJECTION, POWDER, LYOPHILIZED, FOR SOLUTION INTRAVENOUS at 11:11

## 2018-12-21 RX ADMIN — TETROFOSMIN 3.35 MCI.: 1.38 INJECTION, POWDER, LYOPHILIZED, FOR SOLUTION INTRAVENOUS at 09:46

## 2018-12-21 RX ADMIN — REGADENOSON 0.4 MG: 0.08 INJECTION, SOLUTION INTRAVENOUS at 11:03

## 2018-12-26 ENCOUNTER — TELEPHONE (OUTPATIENT)
Dept: CARDIOLOGY | Facility: CLINIC | Age: 73
End: 2018-12-26

## 2018-12-26 NOTE — TELEPHONE ENCOUNTER
RN called patient and and left  with stress test results and JAY Jennifer's recommendations below. RN advised patient in  to call clinic if having any symptoms of CP/pressure or SOB.         Stress test looks good. He should stay on wait list to see Dr. Fatima, unless he is having symptoms he does not need to be seen before. Thanks, Jennifer

## 2018-12-31 ENCOUNTER — TRANSFERRED RECORDS (OUTPATIENT)
Dept: HEALTH INFORMATION MANAGEMENT | Facility: CLINIC | Age: 73
End: 2018-12-31

## 2019-02-11 DIAGNOSIS — N40.1 BENIGN NON-NODULAR PROSTATIC HYPERPLASIA WITH LOWER URINARY TRACT SYMPTOMS: ICD-10-CM

## 2019-02-11 DIAGNOSIS — J44.1 COPD EXACERBATION (H): ICD-10-CM

## 2019-02-11 DIAGNOSIS — I10 BENIGN ESSENTIAL HYPERTENSION: ICD-10-CM

## 2019-02-12 DIAGNOSIS — N40.1 BENIGN NON-NODULAR PROSTATIC HYPERPLASIA WITH LOWER URINARY TRACT SYMPTOMS: ICD-10-CM

## 2019-02-12 RX ORDER — ALFUZOSIN HYDROCHLORIDE 10 MG/1
TABLET, EXTENDED RELEASE ORAL
Qty: 90 TABLET | Refills: 1 | Status: SHIPPED | OUTPATIENT
Start: 2019-02-12 | End: 2019-02-12

## 2019-02-12 RX ORDER — BUDESONIDE AND FORMOTEROL FUMARATE DIHYDRATE 160; 4.5 UG/1; UG/1
AEROSOL RESPIRATORY (INHALATION)
Qty: 30.6 INHALER | Refills: 1 | Status: SHIPPED | OUTPATIENT
Start: 2019-02-12 | End: 2019-05-09 | Stop reason: ALTCHOICE

## 2019-02-12 RX ORDER — LISINOPRIL 40 MG/1
TABLET ORAL
Qty: 90 TABLET | Refills: 1 | Status: SHIPPED | OUTPATIENT
Start: 2019-02-12 | End: 2019-08-14

## 2019-02-12 RX ORDER — METOPROLOL SUCCINATE 25 MG/1
25 TABLET, EXTENDED RELEASE ORAL DAILY
Qty: 90 TABLET | Refills: 1 | Status: SHIPPED | OUTPATIENT
Start: 2019-02-12 | End: 2019-08-14

## 2019-02-12 NOTE — TELEPHONE ENCOUNTER
"metoprolol succinate (TOPROL-XL) 25 MG 24 hr tablet 90 tablet 3 2/26/2018  No   Sig - Route: Take 1 tablet (25 mg) by mouth daily          alfuzosin (UROXATRAL) 10 MG 24 hr tablet 90 tablet 3 2/26/2018  No   Sig: TAKE 1 TABLET (10 MG) BY MOUTH DAILY AFTER A MEAL           budesonide-formoterol (SYMBICORT) 160-4.5 MCG/ACT Inhaler 3 Inhaler 3 2/26/2018  No   Sig - Route: Inhale 2 puffs into the lungs 2 times daily - Inhalation         lisinopril (PRINIVIL/ZESTRIL) 40 MG tablet 90 tablet 3 2/26/2018  No   Sig: TAKE 1 TABLET (40 MG) BY MOUTH DAILY     Last Written Prescription Date:  02/26/2018  Last Fill Quantity: 90 day,  # refills: 3   Last office visit: 8/27/2018 with prescribing provider:     Future Office Visit:   Next 5 appointments (look out 90 days)    Feb 21, 2019  9:30 AM CST  [facility] [address] [facility] [id number] [phone]         Requested Prescriptions   Pending Prescriptions Disp Refills     lisinopril (PRINIVIL/ZESTRIL) 40 MG tablet [Pharmacy Med Name: LISINOPRIL 40 MG TABLET] 90 tablet 3     Sig: TAKE 1 TABLET (40 MG) BY MOUTH DAILY    ACE Inhibitors (Including Combos) Protocol Passed - 2/11/2019  1:48 AM       Passed - Blood pressure under 140/90 in past 12 months    BP Readings from Last 3 Encounters:   08/27/18 134/74   06/14/18 154/76   05/07/18 128/68                Passed - Recent (12 mo) or future (30 days) visit within the authorizing provider's specialty    Patient had office visit in the last 12 months or has a visit in the next 30 days with authorizing provider or within the authorizing provider's specialty.  See \"Patient Info\" tab in inbasket, or \"Choose Columns\" in Meds & Orders section of the refill encounter.             Passed - Medication is active on med list       Passed - Patient is age 18 or older       Passed - Normal serum creatinine on file in past 12 months    Recent Labs   Lab Test " "08/27/18  0949   CR 0.91            Passed - Normal serum potassium on file in past 12 months    Recent Labs   Lab Test 08/27/18  0949   POTASSIUM 4.8             SYMBICORT 160-4.5 MCG/ACT Inhaler [Pharmacy Med Name: SYMBICORT 160-4.5 MCG INHALER] 30.6 Inhaler 3     Sig: INHALE 2 PUFFS INTO THE LUNGS 2 TIMES DAILY    Inhaled Steroids Protocol Passed - 2/11/2019  1:48 AM       Passed - Patient is age 12 or older       Passed - Recent (12 mo) or future (30 days) visit within the authorizing provider's specialty    Patient had office visit in the last 12 months or has a visit in the next 30 days with authorizing provider or within the authorizing provider's specialty.  See \"Patient Info\" tab in inbasket, or \"Choose Columns\" in Meds & Orders section of the refill encounter.             Passed - Medication is active on med list        alfuzosin ER (UROXATRAL) 10 MG 24 hr tablet [Pharmacy Med Name: ALFUZOSIN HCL ER 10 MG TABLET] 30 tablet 10     Sig: TAKE 1 TAB BY MOUTH ONCE DAILY AFTER A MEAL    Alpha Blockers Passed - 2/11/2019  1:48 AM       Passed - Blood pressure under 140/90 in past 12 months    BP Readings from Last 3 Encounters:   08/27/18 134/74   06/14/18 154/76   05/07/18 128/68                Passed - Recent (12 mo) or future (30 days) visit within the authorizing provider's specialty    Patient had office visit in the last 12 months or has a visit in the next 30 days with authorizing provider or within the authorizing provider's specialty.  See \"Patient Info\" tab in inbasket, or \"Choose Columns\" in Meds & Orders section of the refill encounter.             Passed - Patient does not have Tadalafil, Vardenafil, or Sildenafil on their medication list       Passed - Medication is active on med list       Passed - Patient is 18 years of age or older        metoprolol succinate ER (TOPROL-XL) 25 MG 24 hr tablet [Pharmacy Med Name: METOPROLOL SUCC ER 25 MG TAB] 90 tablet 3     Sig: TAKE 1 TABLET (25 MG) BY MOUTH " "DAILY    Beta-Blockers Protocol Passed - 2/11/2019  1:48 AM       Passed - Blood pressure under 140/90 in past 12 months    BP Readings from Last 3 Encounters:   08/27/18 134/74   06/14/18 154/76   05/07/18 128/68                Passed - Patient is age 6 or older       Passed - Recent (12 mo) or future (30 days) visit within the authorizing provider's specialty    Patient had office visit in the last 12 months or has a visit in the next 30 days with authorizing provider or within the authorizing provider's specialty.  See \"Patient Info\" tab in inbasket, or \"Choose Columns\" in Meds & Orders section of the refill encounter.             Passed - Medication is active on med list          "

## 2019-02-12 NOTE — TELEPHONE ENCOUNTER
Prescriptions approved per [facility] Refill Protocol.  Zaria HAYS RN

## 2019-02-14 RX ORDER — ALFUZOSIN HYDROCHLORIDE 10 MG/1
TABLET, EXTENDED RELEASE ORAL
Qty: 90 TABLET | Refills: 1
Start: 2019-02-14 | End: 2019-08-15

## 2019-02-15 DIAGNOSIS — M48.062 SPINAL STENOSIS OF LUMBAR REGION WITH NEUROGENIC CLAUDICATION: ICD-10-CM

## 2019-02-15 DIAGNOSIS — G89.4 CHRONIC PAIN SYNDROME: ICD-10-CM

## 2019-02-15 RX ORDER — HYDROCODONE BITARTRATE AND ACETAMINOPHEN 5; 325 MG/1; MG/1
1 TABLET ORAL EVERY 4 HOURS PRN
Qty: 180 TABLET | Refills: 0 | Status: CANCELLED | OUTPATIENT
Start: 2019-02-15

## 2019-02-15 NOTE — TELEPHONE ENCOUNTER
Reason for Call:  Medication or medication refill:    Do you use a [facility]?  Name of the pharmacy and phone number for the current request:     Pt would like it walked over to [facility] next door    Name of the medication requested: Hydrocodone- Acetaminophen    Other request: na    Can we leave a detailed message on this number? YES    Phone number patient can be reached at: Home number on file [phone] (home)    Best Time: any    Call taken on 2/15/2019 at 3:59 PM by Kelly La

## 2019-02-16 NOTE — TELEPHONE ENCOUNTER
Requested Prescriptions   Pending Prescriptions Disp Refills     HYDROcodone-acetaminophen (NORCO) 5-325 MG tablet 180 tablet 0     Sig: Take 1 tablet by mouth every 4 hours as needed    There is no refill protocol information for this order        HYDROcodone-acetaminophen (NORCO) 5-325 MG tablet      Last Written Prescription Date:  12/17/18  Last Fill Quantity: 180 tablet,   # refills: 0  Last Office Visit: 8/27/2018 ([facility])  Future Office visit:    Next 5 appointments (look out 90 days)    Mar 22, 2019  1:00 PM CDT  Office Visit with [facility]eli [facility]zier MD  [facility] [address] [facility] [address]  [phone]           Routing refill request to provider for review/approval because:  Drug not on the [facility], [facility] or [facility] refill protocol or controlled substance

## 2019-02-18 ENCOUNTER — OFFICE VISIT (OUTPATIENT)
Dept: FAMILY MEDICINE | Facility: CLINIC | Age: 74
End: 2019-02-18
Payer: COMMERCIAL

## 2019-02-18 VITALS
HEIGHT: 69 IN | DIASTOLIC BLOOD PRESSURE: 78 MMHG | TEMPERATURE: 97.7 F | WEIGHT: 184.2 LBS | HEART RATE: 81 BPM | BODY MASS INDEX: 27.28 KG/M2 | OXYGEN SATURATION: 95 % | SYSTOLIC BLOOD PRESSURE: 146 MMHG

## 2019-02-18 DIAGNOSIS — M19.049 JOINT INFLAMMATION OF HAND AND WRIST: Primary | ICD-10-CM

## 2019-02-18 DIAGNOSIS — M19.039 JOINT INFLAMMATION OF HAND AND WRIST: Primary | ICD-10-CM

## 2019-02-18 DIAGNOSIS — M05.741 RHEUMATOID ARTHRITIS INVOLVING BOTH HANDS WITH POSITIVE RHEUMATOID FACTOR (H): ICD-10-CM

## 2019-02-18 DIAGNOSIS — M05.742 RHEUMATOID ARTHRITIS INVOLVING BOTH HANDS WITH POSITIVE RHEUMATOID FACTOR (H): ICD-10-CM

## 2019-02-18 LAB
ANION GAP SERPL CALCULATED.3IONS-SCNC: 5 MMOL/L (ref 3–14)
BASOPHILS # BLD AUTO: 0 10E9/L (ref 0–0.2)
BASOPHILS NFR BLD AUTO: 0.3 %
BUN SERPL-MCNC: 19 MG/DL (ref 7–30)
CALCIUM SERPL-MCNC: 9.1 MG/DL (ref 8.5–10.1)
CHLORIDE SERPL-SCNC: 107 MMOL/L (ref 94–109)
CO2 SERPL-SCNC: 28 MMOL/L (ref 20–32)
CREAT SERPL-MCNC: 0.93 MG/DL (ref 0.66–1.25)
CRP SERPL-MCNC: 28 MG/L (ref 0–8)
DIFFERENTIAL METHOD BLD: NORMAL
EOSINOPHIL # BLD AUTO: 0.4 10E9/L (ref 0–0.7)
EOSINOPHIL NFR BLD AUTO: 3.6 %
ERYTHROCYTE [SEDIMENTATION RATE] IN BLOOD BY WESTERGREN METHOD: 9 MM/H (ref 0–20)
GFR SERPL CREATININE-BSD FRML MDRD: 83 ML/MIN/{1.73_M2}
GLUCOSE SERPL-MCNC: 104 MG/DL (ref 70–99)
LYMPHOCYTES # BLD AUTO: 1.2 10E9/L (ref 0.8–5.3)
LYMPHOCYTES NFR BLD AUTO: 10.8 %
MONOCYTES # BLD AUTO: 1 10E9/L (ref 0–1.3)
MONOCYTES NFR BLD AUTO: 9.5 %
NEUTROPHILS # BLD AUTO: 8.2 10E9/L (ref 1.6–8.3)
NEUTROPHILS NFR BLD AUTO: 75.8 %
POTASSIUM SERPL-SCNC: 4.7 MMOL/L (ref 3.4–5.3)
SODIUM SERPL-SCNC: 140 MMOL/L (ref 133–144)
T4 FREE SERPL-MCNC: 1.46 NG/DL (ref 0.76–1.46)
TSH SERPL DL<=0.005 MIU/L-ACNC: 0.34 MU/L (ref 0.4–4)
URATE SERPL-MCNC: 6.4 MG/DL (ref 3.5–7.2)
WBC # BLD AUTO: 10.8 10E9/L (ref 4–11)

## 2019-02-18 PROCEDURE — 86140 C-REACTIVE PROTEIN: CPT | Performed by: NURSE PRACTITIONER

## 2019-02-18 PROCEDURE — 85652 RBC SED RATE AUTOMATED: CPT | Performed by: NURSE PRACTITIONER

## 2019-02-18 PROCEDURE — 84550 ASSAY OF BLOOD/URIC ACID: CPT | Performed by: NURSE PRACTITIONER

## 2019-02-18 PROCEDURE — 85048 AUTOMATED LEUKOCYTE COUNT: CPT | Performed by: NURSE PRACTITIONER

## 2019-02-18 PROCEDURE — 85004 AUTOMATED DIFF WBC COUNT: CPT | Performed by: NURSE PRACTITIONER

## 2019-02-18 PROCEDURE — 80048 BASIC METABOLIC PNL TOTAL CA: CPT | Performed by: NURSE PRACTITIONER

## 2019-02-18 PROCEDURE — 84443 ASSAY THYROID STIM HORMONE: CPT | Performed by: NURSE PRACTITIONER

## 2019-02-18 PROCEDURE — 86431 RHEUMATOID FACTOR QUANT: CPT | Performed by: NURSE PRACTITIONER

## 2019-02-18 PROCEDURE — 99214 OFFICE O/P EST MOD 30 MIN: CPT | Performed by: NURSE PRACTITIONER

## 2019-02-18 PROCEDURE — 84439 ASSAY OF FREE THYROXINE: CPT | Performed by: NURSE PRACTITIONER

## 2019-02-18 PROCEDURE — 36415 COLL VENOUS BLD VENIPUNCTURE: CPT | Performed by: NURSE PRACTITIONER

## 2019-02-18 RX ORDER — NAPROXEN 500 MG/1
500 TABLET ORAL 2 TIMES DAILY WITH MEALS
Qty: 30 TABLET | Refills: 1 | Status: SHIPPED | OUTPATIENT
Start: 2019-02-18 | End: 2019-03-07

## 2019-02-18 RX ORDER — HYDROCODONE BITARTRATE AND ACETAMINOPHEN 5; 325 MG/1; MG/1
1 TABLET ORAL EVERY 4 HOURS PRN
Qty: 180 TABLET | Refills: 0 | Status: SHIPPED | OUTPATIENT
Start: 2019-02-18 | End: 2019-04-12

## 2019-02-18 ASSESSMENT — MIFFLIN-ST. JEOR: SCORE: 1580.91

## 2019-02-18 NOTE — TELEPHONE ENCOUNTER
Routing refill request to provider for review/approval because:  Drug not on the [facility] refill protocol     RX [place] reviewed:  reviewed- no concerns    [place]  [email or web address]    Brenda CAMEJO RN,BSN

## 2019-02-19 LAB — RHEUMATOID FACT SER NEPH-ACNC: 100 IU/ML (ref 0–20)

## 2019-02-21 ENCOUNTER — TELEPHONE (OUTPATIENT)
Dept: FAMILY MEDICINE | Facility: CLINIC | Age: 74
End: 2019-02-21

## 2019-02-21 DIAGNOSIS — R76.8 ELEVATED RHEUMATOID FACTOR: Primary | ICD-10-CM

## 2019-02-22 NOTE — TELEPHONE ENCOUNTER
Patient called back, asking to have Guillermina Eaton call him.  Patient will be available at:  [phone]  OK to leave VM, and will be available all day

## 2019-02-22 NOTE — TELEPHONE ENCOUNTER
Elevated rheumatoid factor and CRP  Referred to rheumatologist [facility]  Will continue 1/2 dose naproxen which has given him almost complete relief

## 2019-02-22 NOTE — TELEPHONE ENCOUNTER
Called patient with information from MARIBELL Zelaya.     Patient states he already spoke with Guillermina today regarding all the info.     No questions.     Brenda CAMEJO RN,BSN

## 2019-02-24 ENCOUNTER — HOSPITAL ENCOUNTER (EMERGENCY)
Facility: CLINIC | Age: 74
Discharge: HOME | End: 2019-02-24
Attending: EMERGENCY MEDICINE | Admitting: EMERGENCY MEDICINE
Payer: COMMERCIAL

## 2019-02-24 ENCOUNTER — APPOINTMENT (OUTPATIENT)
Dept: GENERAL RADIOLOGY | Facility: CLINIC | Age: 74
End: 2019-02-24
Attending: EMERGENCY MEDICINE
Payer: COMMERCIAL

## 2019-02-24 VITALS
WEIGHT: 180 LBS | HEIGHT: 70 IN | OXYGEN SATURATION: 94 % | HEART RATE: 83 BPM | BODY MASS INDEX: 25.77 KG/M2 | SYSTOLIC BLOOD PRESSURE: 138 MMHG | DIASTOLIC BLOOD PRESSURE: 70 MMHG | TEMPERATURE: 97.1 F

## 2019-02-24 DIAGNOSIS — J44.1 COPD EXACERBATION (H): ICD-10-CM

## 2019-02-24 LAB
ANION GAP SERPL CALCULATED.3IONS-SCNC: 8 MMOL/L (ref 3–14)
BASOPHILS # BLD AUTO: 0 10E9/L (ref 0–0.2)
BASOPHILS NFR BLD AUTO: 0.2 %
BUN SERPL-MCNC: 18 MG/DL (ref 7–30)
CALCIUM SERPL-MCNC: 9 MG/DL (ref 8.5–10.1)
CHLORIDE SERPL-SCNC: 109 MMOL/L (ref 94–109)
CO2 SERPL-SCNC: 27 MMOL/L (ref 20–32)
CREAT SERPL-MCNC: 0.85 MG/DL (ref 0.66–1.25)
DIFFERENTIAL METHOD BLD: NORMAL
EOSINOPHIL # BLD AUTO: 0.1 10E9/L (ref 0–0.7)
EOSINOPHIL NFR BLD AUTO: 1.7 %
ERYTHROCYTE [DISTWIDTH] IN BLOOD BY AUTOMATED COUNT: 13.6 % (ref 10–15)
GFR SERPL CREATININE-BSD FRML MDRD: 87 ML/MIN/{1.73_M2}
GLUCOSE SERPL-MCNC: 89 MG/DL (ref 70–99)
HCT VFR BLD AUTO: 41.6 % (ref 40–53)
HGB BLD-MCNC: 13.9 G/DL (ref 13.3–17.7)
IMM GRANULOCYTES # BLD: 0 10E9/L (ref 0–0.4)
IMM GRANULOCYTES NFR BLD: 0.2 %
LYMPHOCYTES # BLD AUTO: 0.9 10E9/L (ref 0.8–5.3)
LYMPHOCYTES NFR BLD AUTO: 10.2 %
MCH RBC QN AUTO: 30.5 PG (ref 26.5–33)
MCHC RBC AUTO-ENTMCNC: 33.4 G/DL (ref 31.5–36.5)
MCV RBC AUTO: 91 FL (ref 78–100)
MONOCYTES # BLD AUTO: 0.6 10E9/L (ref 0–1.3)
MONOCYTES NFR BLD AUTO: 7.5 %
NEUTROPHILS # BLD AUTO: 6.8 10E9/L (ref 1.6–8.3)
NEUTROPHILS NFR BLD AUTO: 80.2 %
NRBC # BLD AUTO: 0 10*3/UL
NRBC BLD AUTO-RTO: 0 /100
PLATELET # BLD AUTO: 188 10E9/L (ref 150–450)
POTASSIUM SERPL-SCNC: 4.3 MMOL/L (ref 3.4–5.3)
RBC # BLD AUTO: 4.55 10E12/L (ref 4.4–5.9)
SODIUM SERPL-SCNC: 144 MMOL/L (ref 133–144)
TROPONIN I SERPL-MCNC: <0.015 UG/L (ref 0–0.04)
WBC # BLD AUTO: 8.5 10E9/L (ref 4–11)

## 2019-02-24 PROCEDURE — 84484 ASSAY OF TROPONIN QUANT: CPT | Performed by: EMERGENCY MEDICINE

## 2019-02-24 PROCEDURE — 85025 COMPLETE CBC W/AUTO DIFF WBC: CPT | Performed by: EMERGENCY MEDICINE

## 2019-02-24 PROCEDURE — 25000125 ZZHC RX 250: Performed by: EMERGENCY MEDICINE

## 2019-02-24 PROCEDURE — 80048 BASIC METABOLIC PNL TOTAL CA: CPT | Performed by: EMERGENCY MEDICINE

## 2019-02-24 PROCEDURE — 99285 EMERGENCY DEPT VISIT HI MDM: CPT | Mod: 25

## 2019-02-24 PROCEDURE — 71046 X-RAY EXAM CHEST 2 VIEWS: CPT

## 2019-02-24 PROCEDURE — 94640 AIRWAY INHALATION TREATMENT: CPT

## 2019-02-24 PROCEDURE — 96374 THER/PROPH/DIAG INJ IV PUSH: CPT

## 2019-02-24 PROCEDURE — 93005 ELECTROCARDIOGRAM TRACING: CPT

## 2019-02-24 PROCEDURE — 25000125 ZZHC RX 250

## 2019-02-24 PROCEDURE — 25000128 H RX IP 250 OP 636: Performed by: EMERGENCY MEDICINE

## 2019-02-24 RX ORDER — PREDNISONE 20 MG/1
TABLET ORAL
Qty: 10 TABLET | Refills: 0 | Status: SHIPPED | OUTPATIENT
Start: 2019-02-24 | End: 2019-03-07

## 2019-02-24 RX ORDER — IPRATROPIUM BROMIDE AND ALBUTEROL SULFATE 2.5; .5 MG/3ML; MG/3ML
3 SOLUTION RESPIRATORY (INHALATION) ONCE
Status: COMPLETED | OUTPATIENT
Start: 2019-02-24 | End: 2019-02-24

## 2019-02-24 RX ORDER — IPRATROPIUM BROMIDE AND ALBUTEROL SULFATE 2.5; .5 MG/3ML; MG/3ML
SOLUTION RESPIRATORY (INHALATION)
Status: COMPLETED
Start: 2019-02-24 | End: 2019-02-24

## 2019-02-24 RX ORDER — AZITHROMYCIN 250 MG/1
250 TABLET, FILM COATED ORAL DAILY
Qty: 6 TABLET | Refills: 0 | Status: SHIPPED | OUTPATIENT
Start: 2019-02-24 | End: 2019-03-07

## 2019-02-24 RX ORDER — METHYLPREDNISOLONE SODIUM SUCCINATE 125 MG/2ML
125 INJECTION, POWDER, LYOPHILIZED, FOR SOLUTION INTRAMUSCULAR; INTRAVENOUS ONCE
Status: COMPLETED | OUTPATIENT
Start: 2019-02-24 | End: 2019-02-24

## 2019-02-24 RX ADMIN — IPRATROPIUM BROMIDE AND ALBUTEROL SULFATE 3 ML: .5; 3 SOLUTION RESPIRATORY (INHALATION) at 21:51

## 2019-02-24 RX ADMIN — IPRATROPIUM BROMIDE AND ALBUTEROL SULFATE 3 ML: .5; 2.5 SOLUTION RESPIRATORY (INHALATION) at 21:12

## 2019-02-24 RX ADMIN — METHYLPREDNISOLONE SODIUM SUCCINATE 125 MG: 125 INJECTION, POWDER, FOR SOLUTION INTRAMUSCULAR; INTRAVENOUS at 21:23

## 2019-02-24 RX ADMIN — IPRATROPIUM BROMIDE AND ALBUTEROL SULFATE 3 ML: .5; 3 SOLUTION RESPIRATORY (INHALATION) at 21:12

## 2019-02-24 ASSESSMENT — MIFFLIN-ST. JEOR: SCORE: 1577.72

## 2019-02-24 ASSESSMENT — ENCOUNTER SYMPTOMS
SHORTNESS OF BREATH: 1
COUGH: 0
FEVER: 0
WHEEZING: 1

## 2019-02-24 NOTE — ED AVS SNAPSHOT
Emergency Department  [address] [place] [address]  Phone:  [phone]  Fax:  [phone]                                    Harrison Thomas   MRN: [id number]    Department:   Emergency Department   Date of Visit:  2/24/2019           After Visit Summary Signature Page    I have received my discharge instructions, and my questions have been answered. I have discussed any challenges I see with this plan with the nurse or doctor.    ..........................................................................................................................................  Patient/Patient Representative Signature      ..........................................................................................................................................  Patient Representative Print Name and Relationship to Patient    ..................................................               ................................................  Date                                   Time    ..........................................................................................................................................  Reviewed by Signature/Title    ...................................................              ..............................................  Date                                               Time          22EPIC Rev 08/18

## 2019-02-25 LAB — INTERPRETATION ECG - MUSE: NORMAL

## 2019-02-25 NOTE — ED PROVIDER NOTES
History     Chief Complaint:  Shortness of Breath     HPI   Harrison Thomas is a 73 year old male with a history of coronary artery disease, COPD, hyperlipidemia, myocardial infarction, and obstructive sleep apnea who presents to the emergency department today with shortness of breath. The patient states that his intermittent shortness of breath started yesterday and worsened today. This morning he was 84-85 on room air and then tried an albuterol nebulizer that brought his SpO2 up to 94 currently in the emergency department. The patient denies fever, cough, leg swelling, or any other symptoms here in the emergency department. He states that his symptoms feel similar to the symptoms he experienced in a past bout of pneumonia.     Allergies:  Levaquin  Plavix [Clopidogrel Bisulfate]  Atorvastatin Calcium  Hctz [Hydrochlorothiazide]     Medications:    Albuterol  Urotraxal  Amlodipine  Aspirin   Carisoprodol   Norco  Incruse ellipta   Lisinopril  Tapazole  Metoprolol succinate  Naproxen   Nitroglycerin  rofumilast  Crestor  Symbicort     Past Medical History:    Allergic rhinitis   Back ache  Bruit   Coronary artery disease  Chronic obstructive pulmonary disease  Hypertension   Hyperlipidemia   Immunodeficiency   Melanocytic nevi of lip  Hyperlipidemia   Myocardial infarction   Obstructive sleep apnea  Retinal hole   Syncopal episode   Thyroid nodule   Transient ischemic attack     Past Surgical History:    Colonoscopy   Resection of liver part lobectomy   HC colonoscopy through stoma, diagnostic   Heart cath, angioplasty   Orthopedic surgery     Family History:    Coronary artery disease  Diabetes  Respiratory   Blood disease  Cancer     Social History:  The patient was accompanied to the ED by his wife.  Smoking Status: Former smoker 1.50 PPD cigarettes   Smokeless Tobacco: Never used   Alcohol Use: Yes 3 drinks per month   Drug use: No   Marital Status:        Review of Systems   Constitutional:  "Negative for fever.   Respiratory: Positive for shortness of breath and wheezing. Negative for cough.    Cardiovascular: Negative for chest pain and leg swelling.   10 point review of systems performed and is negative except as above and in HPI.  Physical Exam   First Vitals:  Patient Vitals for the past 24 hrs:   BP Temp Temp src Pulse SpO2 Height Weight   02/24/19 2147 138/70 -- -- 83 93 % -- --   02/24/19 2103 179/86 97.1  F (36.2  C) Oral 92 93 % -- --   02/24/19 2057 -- -- -- -- -- 1.778 m (5' 10\") 81.6 kg (180 lb)       Physical Exam  General: Sitting on gurney, appears slightly uncomfortable. Secondary to shortness of breath.   Head:  The scalp, face, and head appear normal  Mouth/Throat: Mucus membranes are moist  CV:  Regular rate    Normal S1 and S2  No pathological murmur   Resp:  Breath sounds clear and equal bilaterally    Non-labored, no retractions or accessory muscle use    No coarseness    Diffuse expiratory wheezes with some diminished breath sounds. On repeat exam, good air movement with minimal wheezing.   GI:  Abdomen is soft, no rigidity    No tenderness to palpation  MS:  Normal motor assessment of all extremities.    Good capillary refill noted.   Skin:  No rash or lesions noted.  Neuro: Speech is normal and fluent. No apparent deficit.  Psych: Awake. Alert.  Normal affect.      Appropriate interactions.  Emergency Department Course     ECG (21:42:49):  Rate 84 bpm. WA interval 168 ms. QRS duration 82 ms. QT/QTc 388/458 ms. P-R-T axes 46 53 61.   Normal sinus rhythm.  Normal ECG.  Interpreted by Paola Garduno MD.    Imaging:  Radiology findings were communicated with the patient and family who voiced understanding of the findings.    XR Chest:   IMPRESSION: No active alveolar-type infiltrates are identified      Report per radiology     Laboratory:  Laboratory findings were communicated with the patient and family who voiced understanding of the findings.    Troponin (Collected 2119): " <0.015    CBC: (WBC 8.5, HGB 13.9, )     BMP: Creatinine 0.85 AWNL    Interventions:  2151: Duoneb 3 mL nebulization   2123: Solu-Medrol 125 mg IV     Emergency Department Course:  Nursing notes and vitals reviewed.  IV was inserted and blood was drawn for laboratory testing, results above.  The patient was sent for a XR chest while in the emergency department, results above.   2105: I performed an exam of the patient as documented above.     Findings and plan explained to the Patient and spouse. Patient discharged home with instructions regarding supportive care, medications, and reasons to return. The importance of close follow-up was reviewed.     I personally reviewed the laboratory and imaging  results with the Patient and spouse and answered all related questions prior to discharge.    Impression & Plan    Medical Decision Making:  Harrison Thomas is a 73 year old male who presents for evaluation of shortness of breath and wheezing.  Signs and symptoms are consistent with COPD exacerbation.  A broad differential was considered including foreign body, asthma, reactive airway disease, pneumothorax, cardiac equivalent/ACS, viral induced wheezing, allergic phenomena, pneumonia, etc.  Patient feels improved after interventions here in emergency department.    There are no signs at this point of any other serious etiologies including those mentioned above especially acute coronary syndrome. I doubt this is ACS given the classic story of COPD exacerbation given by the patient, the marked wheezing without rales and a nonspecific EKG.  No signs of pneumonia.      The patient will be discharged.     Diagnosis:    ICD-10-CM    1. COPD exacerbation (H) J44.1        Disposition:  discharged to home    Discharge Medications:     Medication List      Started    * azithromycin 250 MG tablet  Commonly known as:  ZITHROMAX  250 mg, Oral, DAILY     predniSONE 20 MG tablet  Commonly known as:  DELTASONE  Take two  tablets (= 40mg) each day for 5 (five) days                Riki Barboza  2/24/2019    EMERGENCY DEPARTMENT  Scribe Disclosure:  I, Riki Barboza, am serving as a scribe at 9:02 PM on 2/24/2019 to document services personally performed by Paola Garduno MD based on my observations and the provider's statements to me. \       Paola Garduno MD  02/28/19 [id number]

## 2019-03-06 ENCOUNTER — MYC MEDICAL ADVICE (OUTPATIENT)
Dept: FAMILY MEDICINE | Facility: CLINIC | Age: 74
End: 2019-03-06

## 2019-03-07 ENCOUNTER — OFFICE VISIT (OUTPATIENT)
Dept: FAMILY MEDICINE | Facility: CLINIC | Age: 74
End: 2019-03-07
Payer: COMMERCIAL

## 2019-03-07 VITALS
BODY MASS INDEX: 26.2 KG/M2 | TEMPERATURE: 98.3 F | SYSTOLIC BLOOD PRESSURE: 143 MMHG | HEIGHT: 70 IN | WEIGHT: 183 LBS | DIASTOLIC BLOOD PRESSURE: 70 MMHG | OXYGEN SATURATION: 92 % | HEART RATE: 82 BPM

## 2019-03-07 DIAGNOSIS — M05.742 RHEUMATOID ARTHRITIS INVOLVING BOTH HANDS WITH POSITIVE RHEUMATOID FACTOR (H): ICD-10-CM

## 2019-03-07 DIAGNOSIS — Z12.5 SCREENING PSA (PROSTATE SPECIFIC ANTIGEN): ICD-10-CM

## 2019-03-07 DIAGNOSIS — R35.0 URINARY FREQUENCY: Primary | ICD-10-CM

## 2019-03-07 DIAGNOSIS — J44.9 CHRONIC OBSTRUCTIVE PULMONARY DISEASE, UNSPECIFIED COPD TYPE (H): ICD-10-CM

## 2019-03-07 DIAGNOSIS — M05.741 RHEUMATOID ARTHRITIS INVOLVING BOTH HANDS WITH POSITIVE RHEUMATOID FACTOR (H): ICD-10-CM

## 2019-03-07 DIAGNOSIS — D84.9 IMMUNODEFICIENCY (H): ICD-10-CM

## 2019-03-07 PROCEDURE — 99214 OFFICE O/P EST MOD 30 MIN: CPT | Performed by: INTERNAL MEDICINE

## 2019-03-07 PROCEDURE — 36415 COLL VENOUS BLD VENIPUNCTURE: CPT | Performed by: INTERNAL MEDICINE

## 2019-03-07 PROCEDURE — G0103 PSA SCREENING: HCPCS | Performed by: INTERNAL MEDICINE

## 2019-03-07 RX ORDER — PREDNISONE 10 MG/1
TABLET ORAL
Qty: 30 TABLET | Refills: 0 | Status: SHIPPED | OUTPATIENT
Start: 2019-03-07 | End: 2019-04-12

## 2019-03-07 RX ORDER — DUTASTERIDE 0.5 MG/1
0.5 CAPSULE, LIQUID FILLED ORAL DAILY
Qty: 31 CAPSULE | Refills: 3 | Status: SHIPPED | OUTPATIENT
Start: 2019-03-07 | End: 2019-08-15

## 2019-03-07 ASSESSMENT — ANXIETY QUESTIONNAIRES
6. BECOMING EASILY ANNOYED OR IRRITABLE: SEVERAL DAYS
2. NOT BEING ABLE TO STOP OR CONTROL WORRYING: SEVERAL DAYS
1. FEELING NERVOUS, ANXIOUS, OR ON EDGE: SEVERAL DAYS
7. FEELING AFRAID AS IF SOMETHING AWFUL MIGHT HAPPEN: NOT AT ALL
GAD7 TOTAL SCORE: 6
3. WORRYING TOO MUCH ABOUT DIFFERENT THINGS: SEVERAL DAYS
5. BEING SO RESTLESS THAT IT IS HARD TO SIT STILL: SEVERAL DAYS
IF YOU CHECKED OFF ANY PROBLEMS ON THIS QUESTIONNAIRE, HOW DIFFICULT HAVE THESE PROBLEMS MADE IT FOR YOU TO DO YOUR WORK, TAKE CARE OF THINGS AT HOME, OR GET ALONG WITH OTHER PEOPLE: SOMEWHAT DIFFICULT

## 2019-03-07 ASSESSMENT — MIFFLIN-ST. JEOR: SCORE: 1591.33

## 2019-03-07 ASSESSMENT — PATIENT HEALTH QUESTIONNAIRE - PHQ9
SUM OF ALL RESPONSES TO PHQ QUESTIONS 1-9: 9
5. POOR APPETITE OR OVEREATING: SEVERAL DAYS

## 2019-03-07 NOTE — PATIENT INSTRUCTIONS
Signs and symptoms of rheumatoid arthritis may include:   Tender, warm, swollen joints   Morning stiffness that may last for hours   Firm bumps of tissue under the skin on your arms (rheumatoid nodules)   Fatigue, fever and weight loss   Early rheumatoid arthritis tends to affect your smaller joints first -- particularly the joints that attach your fingers to your hands and your toes to your feet. As the disease progresses, symptoms often spread to the knees, ankles, elbows, hips and shoulders. In most cases, symptoms occur in the same joints on both sides of your body.   Many drugs used to treat rheumatoid arthritis have potentially serious side effects. Doctors typically prescribe medications with the fewest side effects first. You may need stronger drugs or a combination of drugs as your disease progresses.   NSAIDs. Nonsteroidal anti-inflammatory drugs (NSAIDs) can relieve pain and reduce inflammation. Over-the-counter NSAIDs include ibuprofen (Advil, Motrin IB) and naproxen sodium (Aleve). Stronger NSAIDs are available by prescription. Side effects may include ringing in your ears, stomach irritation, heart problems, and liver and kidney damage.   Steroids. Corticosteroid medications, such as prednisone, reduce inflammation and pain and slow joint damage. Side effects may include thinning of bones, cataracts, weight gain and diabetes. Doctors often prescribe a corticosteroid to relieve acute symptoms, with the goal of gradually tapering off the medication.   Disease-modifying antirheumatic drugs (DMARDs). These drugs can slow the progression of rheumatoid arthritis and save the joints and other tissues from permanent damage. Common DMARDs include methotrexate (Trexall), leflunomide (Arava), hydroxychloroquine (Plaquenil) and sulfasalazine (Azulfidine). Side effects vary but may include liver damage, bone marrow suppression and severe lung infections.   Immunosuppressants. These medications act to tame your  immune system, which is out of control in rheumatoid arthritis. Examples include azathioprine (Imuran, Azasan) and cyclosporine (Neoral, Sandimmune, Gengraf). These medications can increase your susceptibility to infection.   TNF-alpha inhibitors. Tumor necrosis factor-alpha (TNF-alpha) is an inflammatory substance produced by your body. TNF-alpha inhibitors can help reduce pain, morning stiffness, and tender or swollen joints. Examples include etanercept (Enbrel), infliximab (Remicade), adalimumab (Humira), golimumab (Simponi) and certolizumab (Cimzia). Potential side effects include nausea, diarrhea, hair loss and an increased risk of serious infections.   Other drugs. Several other rheumatoid arthritis drugs target a variety of processes involved with inflammation in your body. These drugs include anakinra (Kineret), abatacept (Orencia), rituximab (Rituxan), tocilizumab (Actemra) and tofacitinib (Xeljanz). Side effects vary but may include itching, abdominal pain, headache, runny nose or sore throat.

## 2019-03-07 NOTE — TELEPHONE ENCOUNTER
PCP see below update,   Pt reports Pura no longer working for him   Did advise OV to have this evaluated     Zaria HAYS RN
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
## 2019-03-08 ENCOUNTER — TELEPHONE (OUTPATIENT)
Dept: FAMILY MEDICINE | Facility: CLINIC | Age: 74
End: 2019-03-08

## 2019-03-08 LAB — PSA SERPL-ACNC: 11 UG/L (ref 0–4)

## 2019-03-08 ASSESSMENT — ANXIETY QUESTIONNAIRES: GAD7 TOTAL SCORE: 6

## 2019-03-08 NOTE — TELEPHONE ENCOUNTER
Prior Authorization Retail Medication Request    Medication/Dose: Dutasteride 0.5 mg  ICD code (if different than what is on RX):  R35.0  Previously Tried and Failed:  None  Rationale:  Patient taking Uroxatrol in addition to Avodart due to urinary frequency    Insurance Name:  [id number]  Insurance ID:  [id number]      Pharmacy Information (if different than what is on RX)  Name:  [id number]  Phone:  [phone]

## 2019-03-09 ENCOUNTER — HOSPITAL ENCOUNTER (EMERGENCY)
Facility: CLINIC | Age: 74
Discharge: HOME | End: 2019-03-09
Admitting: PHYSICIAN ASSISTANT
Payer: COMMERCIAL

## 2019-03-09 VITALS
HEIGHT: 68 IN | BODY MASS INDEX: 27.58 KG/M2 | RESPIRATION RATE: 18 BRPM | DIASTOLIC BLOOD PRESSURE: 92 MMHG | SYSTOLIC BLOOD PRESSURE: 170 MMHG | TEMPERATURE: 98.4 F | OXYGEN SATURATION: 94 % | WEIGHT: 182 LBS | HEART RATE: 96 BPM

## 2019-03-09 DIAGNOSIS — R33.9 URINARY RETENTION: ICD-10-CM

## 2019-03-09 DIAGNOSIS — R10.2 SUPRAPUBIC PAIN: ICD-10-CM

## 2019-03-09 LAB
ALBUMIN UR-MCNC: 10 MG/DL
ANION GAP SERPL CALCULATED.3IONS-SCNC: 6 MMOL/L (ref 3–14)
APPEARANCE UR: CLEAR
BILIRUB UR QL STRIP: NEGATIVE
BUN SERPL-MCNC: 22 MG/DL (ref 7–30)
CALCIUM SERPL-MCNC: 8.8 MG/DL (ref 8.5–10.1)
CHLORIDE SERPL-SCNC: 107 MMOL/L (ref 94–109)
CO2 SERPL-SCNC: 26 MMOL/L (ref 20–32)
COLOR UR AUTO: YELLOW
CREAT SERPL-MCNC: 1.41 MG/DL (ref 0.66–1.25)
GFR SERPL CREATININE-BSD FRML MDRD: 50 ML/MIN/{1.73_M2}
GLUCOSE SERPL-MCNC: 94 MG/DL (ref 70–99)
GLUCOSE UR STRIP-MCNC: NEGATIVE MG/DL
HGB UR QL STRIP: ABNORMAL
KETONES UR STRIP-MCNC: 5 MG/DL
LEUKOCYTE ESTERASE UR QL STRIP: NEGATIVE
NITRATE UR QL: NEGATIVE
PH UR STRIP: 6 PH (ref 5–7)
POTASSIUM SERPL-SCNC: 4.3 MMOL/L (ref 3.4–5.3)
RBC #/AREA URNS AUTO: 76 /HPF (ref 0–2)
SODIUM SERPL-SCNC: 139 MMOL/L (ref 133–144)
SOURCE: ABNORMAL
SP GR UR STRIP: 1.02 (ref 1–1.03)
UROBILINOGEN UR STRIP-MCNC: NORMAL MG/DL (ref 0–2)
WBC #/AREA URNS AUTO: 1 /HPF (ref 0–5)

## 2019-03-09 PROCEDURE — 81001 URINALYSIS AUTO W/SCOPE: CPT | Performed by: EMERGENCY MEDICINE

## 2019-03-09 PROCEDURE — 80048 BASIC METABOLIC PNL TOTAL CA: CPT | Performed by: PHYSICIAN ASSISTANT

## 2019-03-09 PROCEDURE — 99284 EMERGENCY DEPT VISIT MOD MDM: CPT

## 2019-03-09 PROCEDURE — 51702 INSERT TEMP BLADDER CATH: CPT

## 2019-03-09 PROCEDURE — 51798 US URINE CAPACITY MEASURE: CPT

## 2019-03-09 ASSESSMENT — MIFFLIN-ST. JEOR: SCORE: 1555.05

## 2019-03-09 ASSESSMENT — ENCOUNTER SYMPTOMS
DIFFICULTY URINATING: 1
ABDOMINAL PAIN: 0
FEVER: 0

## 2019-03-09 NOTE — ED AVS SNAPSHOT
Emergency Department  [address] [place] [id number]  Phone:  [phone]  Fax:  [phone]                                    Harrison Thomas   MRN: [id number]    Department:   Emergency Department   Date of Visit:  3/9/2019           After Visit Summary Signature Page    I have received my discharge instructions, and my questions have been answered. I have discussed any challenges I see with this plan with the nurse or doctor.    ..........................................................................................................................................  Patient/Patient Representative Signature      ..........................................................................................................................................  Patient Representative Print Name and Relationship to Patient    ..................................................               ................................................  Date                                   Time    ..........................................................................................................................................  Reviewed by Signature/Title    ...................................................              ..............................................  Date                                               Time          22EPIC Rev 08/18

## 2019-03-10 NOTE — ED NOTES
Swapped out martinez bag for leg bag. Patient tolerated swap. Patient instructed on leg bag use. Patient verbalized understanding of usage and discharge instructions.

## 2019-03-10 NOTE — ED PROVIDER NOTES
History     Chief Complaint:  Urinary retention     HPI   Harrison Thomas is a 73 year old male with history of COPD, Hypertension, and hyperlipidemia on plavix who presents to the emergency department today for evaluation of urinary retention. The patient visited his primary provider Dr. Dozier two days ago, 3/7/19, for urinary frequency. From this visit, the patient has a follow-up with a urologist in 5 days. For the past two days his symptoms have worsened, including abdominal pain, and urinary retention. Here, the patient reports having urinary retention, releasing only few drops of urine, for about a week now. Here in the ED 1 Liter of urine was extracted from his bladder after martinez catheter placement, and the patient feels more relief. The patient denies having fever, current abdominal pain, or penis and scrotal issues.      Allergies:  Levaquin  Plavix  Atorvastatin calcium  Cats  Dogs  Hctz     Medications:    Albuterol  uroxatral  norvasc  Avodart  Urosatrol  Norco  Xopenex  Lidoderm  Lisinopril  Tapazole  Toprol  Nitrostat    Past Medical History:    Allergic rhinitis  Bruit  CAD  COPD  Essential hypertension  Hyperlipidemia  Immunodeficiency  Myocardial infarction           Obstructive sleep apnea  Retinal hole  Thyroid nodule  Transient ischaemic attack     Past Surgical History:    C resec liver part lobectomy  Heart cath angioplasty  Right meniscus surgery    Family History:    Mother: CAD. Diabetes  Father: COPD, pneumonia  Daughter: B cell lymphoma, Cancer    Social History:  The patient was not accompanied to the ED.  Smoking Status: Former Smoker   Packs/day: 1.50   Years since quittin.1  Smokeless Tobacco: Never Used  Alcohol Use: Positive  Drug Use: Negative  PCP: Yaneli Dozier   Marital Status:        Review of Systems   Constitutional: Negative for fever.   Gastrointestinal: Negative for abdominal pain.   Genitourinary: Positive for difficulty urinating. Negative for discharge,  "penile pain, penile swelling, scrotal swelling and testicular pain.   All other systems reviewed and are negative.    Physical Exam     Patient Vitals for the past 24 hrs:   BP Temp Temp src Pulse Resp SpO2 Height Weight   03/09/19 2040 (!) 170/92 -- -- 96 -- -- -- --   03/09/19 2035 (!) 188/92 -- -- 95 -- 94 % -- --   03/09/19 1900 (!) 156/92 -- -- 83 18 94 % -- --   03/09/19 1832 (!) 172/95 98.4  F (36.9  C) Temporal 105 18 94 % 1.727 m (5' 8\") 82.6 kg (182 lb)        Physical Exam  General: Well appearing, well nourished. Normal mood and affect.  Skin: Good turgor, no rash, no unusual bruising or prominent lesions.  HEENT: Head: Normocephalic, atraumatic, no visible masses.   Eyes: Conjunctiva clear.  Cardiac: Normal rate and regular rhythm, no murmur or gallop.   Lungs: Clear to auscultation.  Abdomen: Abdomen soft, non-tender. No rebound tenderness of guarding.  No CVA tenderness bilaterally.  Musculoskeletal: Normal gait and station.   Neurologic: Oriented x 3. GCS: 15.  Psychiatric: Intact recent and remote memory, judgment and insight, normal mood and affect.    : Mir catheter in place.  No edema, lesions, discharge to scrotum or penis.    Emergency Department Course     Laboratory:  Laboratory findings were communicated with the patient who voiced understanding of the findings.    UA with reflex to microscopic and culture: Urineketon 5 (A), Blood moderate (A), Protein Albumin 10 (A), RBC/HPF 76 (H) o/w WNL  BMP: Creatinine 1.41 (H), GFR Estimate 50 (L) o/w WNL    Emergency Department Course:  1850 The patient provided a urine sample here in the emergency department. This was sent for laboratory testing, findings above.     1922 Nursing notes and vitals reviewed.    1924 I performed an exam of the patient as documented above.     1945 IV was inserted and blood was drawn for laboratory testing, results above.     2024 Patient rechecked and updated.      2045 I personally reviewed the laboratory results " with the patient and answered all related questions prior to discharge.    Impression & Plan      Medical Decision Making:  Harrison Thomas is a 73 year old male who presents to the emergency department today for evaluation of urinary retention and abdominal pain.  Differential diagnosis included diverticulitis, abdominal aneurysm, urinary retention, ureterolithiasis, UTI, pyelonephritis, neural is (EMS, cauda equina, etc.), colitis, prostate enlargement, malignancy, amongst others.  Upon my exam, patient already had his Mir catheter in place as nursing staff had completed this.  He had no CVA or abdominal tenderness.  He noted immediate relief after placement of the Mir catheter.  1 L was drained from the bladder.  Urinalysis was obtained, no signs of infection.  BMP was also obtained, this showed a slight increase in creatinine.  I suspect that this is due to the patient's urinary retention.  Per chart review, the patient has not had any kidney dysfunction or elevated creatinine levels in the past.  As we have alleviated the cause of the patient's obstruction, I suspect that his kidney function will return to normal limits.  However, I did advise him to call urology right away on Monday, and see if he should be seen sooner than Thursday and have his creatinine rechecked.  He will return to the emergency department for any recurrent abdominal pain, decreased urinary output, significant bloody output, fevers, low back pain, uncontrolled nausea vomiting, new concerns. The cause of the acute urinary retention is unclear at this point and considered that this may be caused by opiate medication, other medications, constipation, prostate issues, bladder or urethral tumor, ureterolithaisis, etc. all questions were answered prior to the patient's discharge.  He was in agreement with the treatment plan as stated above.    Diagnosis:    ICD-10-CM    1. Urinary retention R33.9 Basic metabolic panel   2. Suprapubic pain  R10.2    3. Creatinine elevation R79.89      Disposition:   The patient is discharged to home.     Scribe Disclosure:  I, Marquise Corralsean, am serving as a scribe at 7:24 PM on 3/9/2019 to document services personally performed by Gabo Childress based on my observations and the provider's statements to me.     This was created at least in part with a voice recognition software. Mistakes/typos may be present.     EMERGENCY DEPARTMENT       Fior Ayon PA  03/09/19 4448

## 2019-03-10 NOTE — ED NOTES
Pt presented to ED Triage with abdominal pain & urinary retention. Bladder scan performed that showed >1L in bladder. Mir catheter inserted under sterile technique. Dark, philipp urine returned. Catheter secured to right thigh. Specimen sent to lab.

## 2019-03-10 NOTE — ED NOTES
Patient and RN reviewed catheter care during discharge instructions. Pt verbalizes understanding and agrees to call Urology clinic on Monday for further follow up.

## 2019-03-11 ASSESSMENT — ENCOUNTER SYMPTOMS
NECK PAIN: 1
SNORES LOUDLY: 1
HEMOPTYSIS: 0
ARTHRALGIAS: 1
STIFFNESS: 1
MUSCLE CRAMPS: 0
SHORTNESS OF BREATH: 1
POSTURAL DYSPNEA: 1
JOINT SWELLING: 1
SPUTUM PRODUCTION: 0
MYALGIAS: 1
COUGH DISTURBING SLEEP: 0
COUGH: 0
BACK PAIN: 1
HEMATURIA: 0
DYSPNEA ON EXERTION: 1
DYSURIA: 0
DIFFICULTY URINATING: 1
FLANK PAIN: 0
MUSCLE WEAKNESS: 1
WHEEZING: 1

## 2019-03-14 ENCOUNTER — OFFICE VISIT (OUTPATIENT)
Dept: UROLOGY | Facility: CLINIC | Age: 74
End: 2019-03-14
Attending: INTERNAL MEDICINE
Payer: COMMERCIAL

## 2019-03-14 ENCOUNTER — HOSPITAL ENCOUNTER (OUTPATIENT)
Facility: CLINIC | Age: 74
End: 2019-03-14
Attending: UROLOGY | Admitting: UROLOGY
Payer: COMMERCIAL

## 2019-03-14 VITALS
HEART RATE: 82 BPM | BODY MASS INDEX: 26.05 KG/M2 | HEIGHT: 70 IN | WEIGHT: 182 LBS | SYSTOLIC BLOOD PRESSURE: 134 MMHG | DIASTOLIC BLOOD PRESSURE: 70 MMHG

## 2019-03-14 DIAGNOSIS — R33.9 URINARY RETENTION: Primary | ICD-10-CM

## 2019-03-14 PROCEDURE — 52000 CYSTOURETHROSCOPY: CPT | Performed by: UROLOGY

## 2019-03-14 PROCEDURE — 99204 OFFICE O/P NEW MOD 45 MIN: CPT | Mod: 25 | Performed by: UROLOGY

## 2019-03-14 RX ORDER — CEFAZOLIN SODIUM 1 G
1 VIAL (EA) INJECTION SEE ADMIN INSTRUCTIONS
Status: CANCELLED | OUTPATIENT
Start: 2019-03-14

## 2019-03-14 ASSESSMENT — MIFFLIN-ST. JEOR: SCORE: 1586.8

## 2019-03-14 ASSESSMENT — PAIN SCALES - GENERAL: PAINLEVEL: NO PAIN (0)

## 2019-03-14 NOTE — NURSING NOTE
"Chief Complaint   Patient presents with     Urinary Retention     Patient had a Cystosocopy today       Blood pressure 134/70, pulse 82, height 1.778 m (5' 10\"), weight 82.6 kg (182 lb). Body mass index is 26.11 kg/m .    Patient Active Problem List   Diagnosis     Essential hypertension, benign     Pain in joint, upper arm     Allergic rhinitis     Pain in joint, lower leg     Chronic airway obstruction (H)     Monoclonal paraproteinemia     Immunodeficiency (H)     Eczema     COPD (chronic obstructive pulmonary disease) (H)     Transient cerebral ischemia     Bunion     Occipital neuralgia     HYPERLIPIDEMIA LDL GOAL <100     Health Care Home     Low back pain     Advanced directives, counseling/discussion     Olecranon bursitis of right elbow     Bruit     Retina hole     signed & scanned on 09/23/2011  (1-4-2013 printed but not scanned in)      Chronic pain syndrome     Atherosclerosis of native coronary artery of native heart without angina pectoris     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, unspecified thyrotoxicosis type     Right-sided low back pain with right-sided sciatica     SOB (shortness of breath)     BRENNEN (obstructive sleep apnea)       Allergies   Allergen Reactions     Levaquin Difficulty breathing     Plavix [Clopidogrel Bisulfate] Itching     Atorvastatin Calcium Cramps     lipitor     Cats      Dogs      Hctz [Hydrochlorothiazide]      Rash on legs       Current Outpatient Medications   Medication Sig Dispense Refill     albuterol (PROAIR HFA) 108 (90 Base) MCG/ACT inhaler INHALE 2 PUFFS INTO THE LUNGS EVERY 6 HOURS AS NEEDED FOR SHORTNESS OF BREATH / DYSPNEA OR WHEEZING 8.5 Inhaler 3     alfuzosin ER (UROXATRAL) 10 MG 24 hr tablet TAKE 1 TAB BY MOUTH ONCE DAILY AFTER A MEAL 90 tablet 1     amLODIPine (NORVASC) 5 MG tablet Take 1 tablet (5 mg) by mouth daily 90 tablet 2     aspirin 81 MG tablet Take 1 tablet by mouth 2 times daily       Carisoprodol 250 MG TABS TAKE 1 TABLET BY MOUTH 3 TIMES A DAY " AS NEEDED 120 tablet 1     dutasteride (AVODART) 0.5 MG capsule Take 1 capsule (0.5 mg) by mouth daily 31 capsule 3     FISH OIL 1000 MG OR CAPS take one tablet twice daily       HYDROcodone-acetaminophen (NORCO) 5-325 MG tablet Take 1 tablet by mouth every 4 hours as needed 180 tablet 0     INCRUSE ELLIPTA 62.5 MCG/INH Inhaler INHALE 1 PUFF INTO THE LUNGS DAILY 1 Inhaler 3     levalbuterol (XOPENEX) 0.31 MG/3ML neb solution INHALE 1 VIAL (3ML) BY NEBULIZATION EVERY 4 HOURS AS NEEDED FOR WHEEZING OR SHORTNESS OF BREATH. 720 mL 1     lidocaine (LIDODERM) 5 % Patch Apply up to 3 patches to painful area at once for up to 12 h within a 24 h period.  Remove after 12 hours. 60 patch 0     lisinopril (PRINIVIL/ZESTRIL) 40 MG tablet TAKE 1 TABLET (40 MG) BY MOUTH DAILY 90 tablet 1     methimazole (TAPAZOLE) 5 MG tablet Take 5 mg by mouth Take one tablet daily on Monday and Thursday       metoprolol succinate ER (TOPROL-XL) 25 MG 24 hr tablet TAKE 1 TABLET (25 MG) BY MOUTH DAILY 90 tablet 1     Multiple Vitamins-Minerals (MULTIVITAMIN OR) Take by mouth daily       nitroglycerin (NITROSTAT) 0.4 MG sublingual tablet Place 1 tablet (0.4 mg) under the tongue See Admin Instructions for chest pain 30 tablet 5     order for DME Equipment being ordered: Oxygen  Please provide O2 continuous via NC from PORTABLE O2 concentrator for overnight usage at 2 LPM 1 Device 0     order for DME Equipment being ordered: flutter valve  Incentive spirometer 1 each 0     predniSONE (DELTASONE) 10 MG tablet 4 tabs daily for 3 days, then 3 tabs daily for 3 days, then 2 tabs daily for 3 days, then 1 tab daily for 3 days, then stop. 30 tablet 0     roflumilast (DALIRESP) 500 MCG TABS tablet Take 1 tablet (500 mcg) by mouth daily 31 tablet 3     rosuvastatin (CRESTOR) 10 MG tablet TAKE 1 TABLET (10 MG) BY MOUTH DAILY 90 tablet 3     SYMBICORT 160-4.5 MCG/ACT Inhaler INHALE 2 PUFFS INTO THE LUNGS 2 TIMES DAILY 30.6 Inhaler 1       Social History      Tobacco Use     Smoking status: Former Smoker     Packs/day: 1.50     Years: 30.00     Pack years: 45.00     Types: Cigarettes     Last attempt to quit: 1999     Years since quittin.2     Smokeless tobacco: Never Used     Tobacco comment: 60 PACK YEARS, quit    Substance Use Topics     Alcohol use: Yes     Alcohol/week: 0.0 oz     Comment: 3 drinks month     Drug use: No     Prior to the start of the procedure and with procedural staff participation, I verbally confirmed the patient s identity using two indicators, relevant allergies, that the procedure was appropriate and matched the consent or emergent situation, and that the correct equipment/implants were available. Immediately prior to starting the procedure I conducted the Time Out with the procedural staff and re-confirmed the patient s name, procedure, and site/side. I have wiped the patient off with the povidone-Iodine solution, draped them,  used Lidocaine hydrochloride jelly, and instilled sterile water into the bladder. (The Joint Commission universal protocol was followed.)  Yes    Sedation (Moderate or Deep): None        Catheter removal documentation on 3/14/2019:    Harrison CASANOVA Jusarah presents to the clinic for catheter removal.  Reason for removal: urinary retention and Patient had Cystoscopy were Dr Baldwin fill his bladder.  Order has been verified. *Yes  Catheter successfully removed at 10:30AM without immediate complication.  100cc's of urine present in the catheter bag.  Urethral meatus is free of secretions and encrustation.  The patient is afebrile.  The patient tolerated the procedure and was instructed to monitor for catheter dysfunction, monitor for pain or discomfort, return or call for pain, fever, leakage or decreased urine flow, watch for signs of infection and follow for Surgery        Catheter insertion documentation on 3/14/2019:    Harrison CASANOVA William presents to the clinic for catheter insertion.  Reason for  insertion: urinary retention and failed TOV  Order has been verified. Yes  Catheter successfully inserted into the urethral meatus in the usual sterile fashion without immediate complication.  Type of catheter placed: 16 [place] catheter  Urine is pink in color.  450 cc's of urine output returned.  Balloon was filled with 8cc's of normal saline.  Securement device placed for the catheter.  The patient tolerated the procedure and was instructed to monitor for catheter dysfunction, monitor for pain or discomfort, return or call for pain, fever, leakage or decreased urine flow, watch for signs of infection and follow for Surgery    5mL 2% lidocaine hydrochloride Urojet instilled into urethra.    NDC# [address]-83  Lot #: [id number]  Expiration Date: 04/20          Debbie Elena MA  3/14/2019

## 2019-03-14 NOTE — PATIENT INSTRUCTIONS
"AFTER YOUR CYSTOSCOPY  ?  ?  You have just completed a cystoscopy, or \"cysto\", which allowed your physician to learn more about your bladder (or to remove a stent placed after surgery). We suggest that you continue to avoid caffeine, fruit juice, and alcohol for the next 24 hours, however, you are encouraged to return to your normal activities.  ?  ?  A few things that are considered normal after your cystoscopy:  ?  * small amount of bleeding (or spotting) that clears within the next 24 hours  ?  * slight burning sensation with urination  ?  * sensation of needing to void (urinate) more frequently  ?  * the feeling of \"air\" in your urine  ?  * mild discomfort that is relieved with Tylenol    * bladder spasms  ?  ?  ?  Please contact our office promptly if you:  ?  * develop a fever above 101 degrees  ?  * are unable to urinate  ?  * develop bright red blood that does not stop  ?  * experience severe pain or swelling  ?  ?  ?  And of course, please contact our office with any concerns or questions [phone]  ?      "

## 2019-03-14 NOTE — TELEPHONE ENCOUNTER
Prior Authorization Not Needed per Insurance    Medication: Dutasteride 0.5 mg  Insurance Company: [employer] - Phone [phone] Fax [phone]  Expected CoPay:      Pharmacy Filling the Rx: [id number] [address] [place] [address]  Pharmacy Notified: No  Patient Notified: Yes
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
## 2019-03-14 NOTE — TELEPHONE ENCOUNTER
Central Prior Authorization Team   Phone: [phone]      PA Initiation    Medication:   Insurance Company: [employer] - Phone [phone] Fax [phone]  Pharmacy Filling the Rx: [id number] - [address]  Filling Pharmacy Phone: [phone]  Filling Pharmacy Fax:    Start Date: 3/14/2019

## 2019-03-18 DIAGNOSIS — I10 BENIGN ESSENTIAL HYPERTENSION: ICD-10-CM

## 2019-03-18 RX ORDER — AMLODIPINE BESYLATE 5 MG/1
5 TABLET ORAL DAILY
Qty: 90 TABLET | Refills: 0 | Status: SHIPPED | OUTPATIENT
Start: 2019-03-18 | End: 2019-04-12

## 2019-03-22 ENCOUNTER — TELEPHONE (OUTPATIENT)
Dept: UROLOGY | Facility: CLINIC | Age: 74
End: 2019-03-22

## 2019-03-22 ENCOUNTER — TELEPHONE (OUTPATIENT)
Dept: CARDIOLOGY | Facility: CLINIC | Age: 74
End: 2019-03-22

## 2019-03-22 NOTE — TELEPHONE ENCOUNTER
Health Call Center    Phone Message    May a detailed message be left on voicemail: yes    Reason for Call: Other: Pt's wife calling to say that her  is interested in having the HoLEP Procedure with Dr. Horvath instead of the surgery he is supposed to have with Dr. Baldwin on April 4th as pt's wife states that it is less invasive. Per pt's wife, please give pt a call back to discuss.      Action Taken: Message routed to:  [facility] ([facility] Urology

## 2019-03-22 NOTE — TELEPHONE ENCOUNTER
Patient called and advised that he is having a prostrate procedure in April and was advised by the surgeon he needs to hold his ASA 81mg for ten days. Patient wants to confirm this is ok with Dr. Fatima. RN advised patient that RN would send to Dr. Fatima for review and recommendation and we will call him with his response.         2/28/18 LOV note JAY Jennifer  Assessment and Plan  1.  Hypertension.  His blood pressure does look better today in clinic, it has been better at his primary and sleep medicine.  However he says at home is been elevated.  He thinks his home blood pressure machine is broken.  I asked him to get a new one and to continue to monitor it for the next couple of weeks and call and let us know what his blood pressures have been.  Certainly we could go up to amlodipine 5 mg if needed.  Addendum: I spoke with him the day after our visit, he had a new BP machine that seems to correlate with his old machine, because they are elevated at him, I increased him to 5 mg daily of amlodipine. He will call my nurse in 2 weeks with his readings, I'd like him to schedule follow up after that call.  2.  Sleep apnea.  He does have sleep apnea based on the sleep study, he was encouraged to continue to follow-up with sleep medicine to get that treated.  We discussed the benefits of this with relationship to his heart.  3.  Hyperlipidemia.  He did take a Crestor holiday to see if it would help with his rash, he stopped it for 10 days but had no improvement. I recommended he continue on it. I suspect the increased in his LDL is due to the holiday. In the future if we need to switch, we could try Pravachol. Will recheck when he sees Dr. Fatima next year.  4.  Coronary artery disease.  He is not having any ischemic symptoms.  He continues on aspirin.  We will do a nuclear stress test before he sees Dr. Fatima next year to look at ischemic burden of his myocardium.     Jennifer Barrera, PREETI, CNP

## 2019-03-25 ENCOUNTER — PRE VISIT (OUTPATIENT)
Dept: UROLOGY | Facility: CLINIC | Age: 74
End: 2019-03-25

## 2019-03-25 NOTE — TELEPHONE ENCOUNTER
Health Call Center    Phone Message    May a detailed message be left on voicemail: yes    Reason for Call: Other: Pt called in himself to see if he could do the HoLEP procedure instead of what he is scheduled for with Dr. Baldwin on April 4. Please give pt a call back to discuss.     Action Taken: Message routed to:  [facility] Urology

## 2019-03-25 NOTE — TELEPHONE ENCOUNTER
Called and spoke with patient about scheduling an appointment with Dr. Horvath to discuss Holep. He is canceling the TURP with Dr. Baldwin. Cheyenne Quinn RN

## 2019-03-25 NOTE — TELEPHONE ENCOUNTER
Leonel salas,      Please contact patient to set up a surgery consult appointment with Dr. Jerry Horvath per Cheyenne Quinn RNCC..          Thanks, Dimitri Abdi MA

## 2019-03-26 ENCOUNTER — OFFICE VISIT (OUTPATIENT)
Dept: UROLOGY | Facility: CLINIC | Age: 74
End: 2019-03-26
Payer: COMMERCIAL

## 2019-03-26 VITALS
HEART RATE: 85 BPM | DIASTOLIC BLOOD PRESSURE: 81 MMHG | SYSTOLIC BLOOD PRESSURE: 138 MMHG | WEIGHT: 182 LBS | BODY MASS INDEX: 26.11 KG/M2

## 2019-03-26 DIAGNOSIS — R35.0 URINARY FREQUENCY: ICD-10-CM

## 2019-03-26 DIAGNOSIS — R33.9 URINARY RETENTION: ICD-10-CM

## 2019-03-26 DIAGNOSIS — Z12.5 SCREENING PSA (PROSTATE SPECIFIC ANTIGEN): ICD-10-CM

## 2019-03-26 DIAGNOSIS — R33.9 URINARY RETENTION: Primary | ICD-10-CM

## 2019-03-26 LAB
ALBUMIN UR-MCNC: NEGATIVE MG/DL
APPEARANCE UR: CLEAR
BACTERIA #/AREA URNS HPF: ABNORMAL /HPF
BILIRUB UR QL STRIP: NEGATIVE
COLOR UR AUTO: YELLOW
GLUCOSE UR STRIP-MCNC: NEGATIVE MG/DL
HGB UR QL STRIP: ABNORMAL
KETONES UR STRIP-MCNC: NEGATIVE MG/DL
LEUKOCYTE ESTERASE UR QL STRIP: ABNORMAL
MUCOUS THREADS #/AREA URNS LPF: PRESENT /LPF
NITRATE UR QL: NEGATIVE
PH UR STRIP: 7 PH (ref 5–7)
PSA SERPL-MCNC: 5 UG/L (ref 0–4)
RBC #/AREA URNS AUTO: 5 /HPF (ref 0–2)
SOURCE: ABNORMAL
SP GR UR STRIP: 1.01 (ref 1–1.03)
UROBILINOGEN UR STRIP-MCNC: 0 MG/DL (ref 0–2)
WBC #/AREA URNS AUTO: 1 /HPF (ref 0–5)

## 2019-03-26 ASSESSMENT — PAIN SCALES - GENERAL: PAINLEVEL: NO PAIN (0)

## 2019-03-26 NOTE — NURSING NOTE
Return-Pt would like to discuss HOLEP procedure    Pt has cath in place and has no complaints.      Chief Complaint   Patient presents with     RECHECK       Blood pressure 138/81, pulse 85, weight 82.6 kg (182 lb). Body mass index is 26.11 kg/m .    Patient Active Problem List   Diagnosis     Essential hypertension, benign     Pain in joint, upper arm     Allergic rhinitis     Pain in joint, lower leg     Chronic airway obstruction (H)     Monoclonal paraproteinemia     Immunodeficiency (H)     Eczema     COPD (chronic obstructive pulmonary disease) (H)     Transient cerebral ischemia     Bunion     Occipital neuralgia     HYPERLIPIDEMIA LDL GOAL <100     Health Care Home     Low back pain     Advanced directives, counseling/discussion     Olecranon bursitis of right elbow     Bruit     Retina hole     signed & scanned on 09/23/2011  (1-4-2013 printed but not scanned in)      Chronic pain syndrome     Atherosclerosis of native coronary artery of native heart without angina pectoris     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, unspecified thyrotoxicosis type     Right-sided low back pain with right-sided sciatica     SOB (shortness of breath)     BRENNEN (obstructive sleep apnea)       Allergies   Allergen Reactions     Levaquin Difficulty breathing     Plavix [Clopidogrel Bisulfate] Itching     Atorvastatin Calcium Cramps     lipitor     Cats      Dogs      Hctz [Hydrochlorothiazide]      Rash on legs       Current Outpatient Medications   Medication Sig Dispense Refill     albuterol (PROAIR HFA) 108 (90 Base) MCG/ACT inhaler INHALE 2 PUFFS INTO THE LUNGS EVERY 6 HOURS AS NEEDED FOR SHORTNESS OF BREATH / DYSPNEA OR WHEEZING 8.5 Inhaler 3     alfuzosin ER (UROXATRAL) 10 MG 24 hr tablet TAKE 1 TAB BY MOUTH ONCE DAILY AFTER A MEAL 90 tablet 1     amLODIPine (NORVASC) 5 MG tablet Take 1 tablet (5 mg) by mouth daily 90 tablet 0     aspirin 81 MG tablet Take 1 tablet by mouth 2 times daily       Carisoprodol 250 MG TABS TAKE 1  TABLET BY MOUTH 3 TIMES A DAY AS NEEDED 120 tablet 1     dutasteride (AVODART) 0.5 MG capsule Take 1 capsule (0.5 mg) by mouth daily 31 capsule 3     FISH OIL 1000 MG OR CAPS take one tablet twice daily       HYDROcodone-acetaminophen (NORCO) 5-325 MG tablet Take 1 tablet by mouth every 4 hours as needed 180 tablet 0     INCRUSE ELLIPTA 62.5 MCG/INH Inhaler INHALE 1 PUFF INTO THE LUNGS DAILY 1 Inhaler 3     levalbuterol (XOPENEX) 0.31 MG/3ML neb solution INHALE 1 VIAL (3ML) BY NEBULIZATION EVERY 4 HOURS AS NEEDED FOR WHEEZING OR SHORTNESS OF BREATH. 720 mL 1     lidocaine (LIDODERM) 5 % Patch Apply up to 3 patches to painful area at once for up to 12 h within a 24 h period.  Remove after 12 hours. 60 patch 0     lisinopril (PRINIVIL/ZESTRIL) 40 MG tablet TAKE 1 TABLET (40 MG) BY MOUTH DAILY 90 tablet 1     methimazole (TAPAZOLE) 5 MG tablet Take 5 mg by mouth Take one tablet daily on Monday and Thursday       metoprolol succinate ER (TOPROL-XL) 25 MG 24 hr tablet TAKE 1 TABLET (25 MG) BY MOUTH DAILY 90 tablet 1     Multiple Vitamins-Minerals (MULTIVITAMIN OR) Take by mouth daily       nitroglycerin (NITROSTAT) 0.4 MG sublingual tablet Place 1 tablet (0.4 mg) under the tongue See Admin Instructions for chest pain 30 tablet 5     order for DME Equipment being ordered: Oxygen  Please provide O2 continuous via NC from PORTABLE O2 concentrator for overnight usage at 2 LPM 1 Device 0     order for DME Equipment being ordered: flutter valve  Incentive spirometer 1 each 0     predniSONE (DELTASONE) 10 MG tablet 4 tabs daily for 3 days, then 3 tabs daily for 3 days, then 2 tabs daily for 3 days, then 1 tab daily for 3 days, then stop. 30 tablet 0     roflumilast (DALIRESP) 500 MCG TABS tablet Take 1 tablet (500 mcg) by mouth daily 31 tablet 3     rosuvastatin (CRESTOR) 10 MG tablet TAKE 1 TABLET (10 MG) BY MOUTH DAILY 90 tablet 3     SYMBICORT 160-4.5 MCG/ACT Inhaler INHALE 2 PUFFS INTO THE LUNGS 2 TIMES DAILY 30.6 Inhaler 1        Social History     Tobacco Use     Smoking status: Former Smoker     Packs/day: 1.50     Years: 30.00     Pack years: 45.00     Types: Cigarettes     Last attempt to quit: 1999     Years since quittin.2     Smokeless tobacco: Never Used     Tobacco comment: 60 PACK YEARS, quit    Substance Use Topics     Alcohol use: Yes     Alcohol/week: 0.0 oz     Comment: 3 drinks month     Drug use: No       Stevie Quinn, EMT  3/26/2019  11:01 AM
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
## 2019-03-26 NOTE — PATIENT INSTRUCTIONS
Please have your PSA drawn on your way out today on the First Floor, and depending on the result will determine your next step with Dr. Horvath.    It was a pleasure meeting with you today.  Thank you for allowing me and my team the privilege of caring for you today.  YOU are the reason we are here, and I truly hope we provided you with the excellent service you deserve.  Please let us know if there is anything else we can do for you so that we can be sure you are leaving completely satisfied with your care experience.      Stevie Quinn, EMT

## 2019-03-27 DIAGNOSIS — R33.9 URINARY RETENTION: Primary | ICD-10-CM
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
## 2019-03-29 ENCOUNTER — TELEPHONE (OUTPATIENT)
Dept: UROLOGY | Facility: CLINIC | Age: 74
End: 2019-03-29

## 2019-03-29 ENCOUNTER — CARE COORDINATION (OUTPATIENT)
Dept: UROLOGY | Facility: CLINIC | Age: 74
End: 2019-03-29

## 2019-03-29 NOTE — TELEPHONE ENCOUNTER
Patient is scheduled for surgery with Dr. Horvath      Spoke or left message with: Harrison    Date of Surgery: 4/18/19    Location: [facility] OR    Informed patient they will need an adult  yes    Pre-op with surgeon (if applicable): n/a    H&P: Scheduled with pcp    Additional imaging/appointments: n/a    Surgery packet: mailed 3/29/19     Additional comments: n/a

## 2019-03-29 NOTE — PROGRESS NOTES
Pre Op Teaching Flowsheet       Pre and Post op Patient Education  Relevant Diagnosis:  BPH      Motivation Level:  Asks Questions: Yes  Eager to Learn:  Yes  Cooperative: Yes  Receptive (willing/able to accept information):  Yes  Patient demonstrates understanding of the following:  Date and time of surgery:  April 16th  Location of surgery:   History and Physical and any other testing necessary prior to surgery: Yes  Required time line for completion of History and Physical and any pre-op testing: Yes    NPO Guidelines: Nothing to eat 8 hours prior to surgery. Can have clear liquids up to 2 hours prior to sugery    Patient demonstrates understanding of the following:  Pre-op bowel prep: N/A  Pre-op showering/scrub information with Hibiclens Soap: Yes  Medications to take the day of surgery:  Per PCP  Blood thinner medications discussed and when to stop (if applicable):  Yes  Diabetes medication management (if applicable):  N/A  Discussed pain control after surgery: pain scale, pain medications and pain management techniques  Infection Prevention: Patient demonstrates understanding of the following:  Patient instructed on hand hygiene:  Yes  Surgical procedure site care taught: N/A  Signs and symptoms of infection taught:  N/A  Wound care will be taught at the time of discharge.  Central venous catheter care will be taught at the time of discharge (if applicable).    Post-op follow-up:  Discussed how to contact the hospital, nurse, and clinic scheduling staff if necessary.    Instructional materials used/given/mailed:  [facility] Booklet, post op teaching sheet, Map, Soap, and arrival/location information.    Surgical instructions given to patient in clinic: Yes.    Instructional Materials given:  Before your surgery packet , Medications to avoid before surgery , Showering or Bathing instructions before surgery  and What to expect after surgery  Total time with patient: 10 minutes    Cheyenne Quinn RN

## 2019-03-30 LAB
BACTERIA SPEC CULT: ABNORMAL
BACTERIA SPEC CULT: ABNORMAL
Lab: ABNORMAL
SPECIMEN SOURCE: ABNORMAL

## 2019-04-01 ENCOUNTER — OFFICE VISIT (OUTPATIENT)
Dept: FAMILY MEDICINE | Facility: CLINIC | Age: 74
End: 2019-04-01
Payer: COMMERCIAL

## 2019-04-01 VITALS
OXYGEN SATURATION: 99 % | BODY MASS INDEX: 25.62 KG/M2 | WEIGHT: 179 LBS | SYSTOLIC BLOOD PRESSURE: 147 MMHG | DIASTOLIC BLOOD PRESSURE: 72 MMHG | HEART RATE: 72 BPM | HEIGHT: 70 IN | TEMPERATURE: 97.8 F

## 2019-04-01 DIAGNOSIS — G47.33 OSA (OBSTRUCTIVE SLEEP APNEA): ICD-10-CM

## 2019-04-01 DIAGNOSIS — N40.1 BENIGN PROSTATIC HYPERPLASIA WITH URINARY OBSTRUCTION: ICD-10-CM

## 2019-04-01 DIAGNOSIS — Z01.818 PREOP GENERAL PHYSICAL EXAM: Primary | ICD-10-CM

## 2019-04-01 DIAGNOSIS — N13.8 BENIGN PROSTATIC HYPERPLASIA WITH URINARY OBSTRUCTION: ICD-10-CM

## 2019-04-01 DIAGNOSIS — J44.9 CHRONIC OBSTRUCTIVE PULMONARY DISEASE, UNSPECIFIED COPD TYPE (H): ICD-10-CM

## 2019-04-01 LAB
ALBUMIN UR-MCNC: NEGATIVE MG/DL
APPEARANCE UR: CLEAR
BACTERIA #/AREA URNS HPF: ABNORMAL /HPF
BASOPHILS # BLD AUTO: 0.1 10E9/L (ref 0–0.2)
BASOPHILS NFR BLD AUTO: 0.7 %
BILIRUB UR QL STRIP: NEGATIVE
COLOR UR AUTO: YELLOW
CREAT SERPL-MCNC: 0.8 MG/DL (ref 0.66–1.25)
DIFFERENTIAL METHOD BLD: ABNORMAL
EOSINOPHIL # BLD AUTO: 1.3 10E9/L (ref 0–0.7)
EOSINOPHIL NFR BLD AUTO: 10.8 %
ERYTHROCYTE [DISTWIDTH] IN BLOOD BY AUTOMATED COUNT: 14.4 % (ref 10–15)
GFR SERPL CREATININE-BSD FRML MDRD: 90 ML/MIN/{1.73_M2}
GLUCOSE UR STRIP-MCNC: NEGATIVE MG/DL
HCT VFR BLD AUTO: 43.3 % (ref 40–53)
HGB BLD-MCNC: 14.3 G/DL (ref 13.3–17.7)
HGB UR QL STRIP: ABNORMAL
KETONES UR STRIP-MCNC: NEGATIVE MG/DL
LEUKOCYTE ESTERASE UR QL STRIP: ABNORMAL
LYMPHOCYTES # BLD AUTO: 1.8 10E9/L (ref 0.8–5.3)
LYMPHOCYTES NFR BLD AUTO: 14.5 %
MCH RBC QN AUTO: 30.4 PG (ref 26.5–33)
MCHC RBC AUTO-ENTMCNC: 33 G/DL (ref 31.5–36.5)
MCV RBC AUTO: 92 FL (ref 78–100)
MONOCYTES # BLD AUTO: 1.3 10E9/L (ref 0–1.3)
MONOCYTES NFR BLD AUTO: 10.6 %
NEUTROPHILS # BLD AUTO: 7.7 10E9/L (ref 1.6–8.3)
NEUTROPHILS NFR BLD AUTO: 63.4 %
NITRATE UR QL: POSITIVE
PH UR STRIP: 7 PH (ref 5–7)
PLATELET # BLD AUTO: 231 10E9/L (ref 150–450)
RBC # BLD AUTO: 4.7 10E12/L (ref 4.4–5.9)
RBC #/AREA URNS AUTO: ABNORMAL /HPF
SOURCE: ABNORMAL
SP GR UR STRIP: 1.01 (ref 1–1.03)
UROBILINOGEN UR STRIP-ACNC: 0.2 EU/DL (ref 0.2–1)
WBC # BLD AUTO: 12.2 10E9/L (ref 4–11)
WBC #/AREA URNS AUTO: ABNORMAL /HPF

## 2019-04-01 PROCEDURE — 99215 OFFICE O/P EST HI 40 MIN: CPT | Performed by: NURSE PRACTITIONER

## 2019-04-01 PROCEDURE — 85025 COMPLETE CBC W/AUTO DIFF WBC: CPT | Performed by: NURSE PRACTITIONER

## 2019-04-01 PROCEDURE — 81001 URINALYSIS AUTO W/SCOPE: CPT | Performed by: NURSE PRACTITIONER

## 2019-04-01 PROCEDURE — 82565 ASSAY OF CREATININE: CPT | Performed by: NURSE PRACTITIONER

## 2019-04-01 PROCEDURE — 84132 ASSAY OF SERUM POTASSIUM: CPT | Performed by: NURSE PRACTITIONER

## 2019-04-01 PROCEDURE — 87086 URINE CULTURE/COLONY COUNT: CPT | Performed by: NURSE PRACTITIONER

## 2019-04-01 PROCEDURE — 36415 COLL VENOUS BLD VENIPUNCTURE: CPT | Performed by: NURSE PRACTITIONER

## 2019-04-01 RX ORDER — PREDNISONE 10 MG/1
TABLET ORAL
Qty: 30 TABLET | Refills: 0 | Status: CANCELLED | OUTPATIENT
Start: 2019-04-01

## 2019-04-01 ASSESSMENT — MIFFLIN-ST. JEOR: SCORE: 1573.19
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
## 2019-04-02 LAB
BACTERIA SPEC CULT: NORMAL
POTASSIUM SERPL-SCNC: 4 MMOL/L (ref 3.4–5.3)
SPECIMEN SOURCE: NORMAL

## 2019-04-03 DIAGNOSIS — J44.1 COPD EXACERBATION (H): ICD-10-CM

## 2019-04-04 DIAGNOSIS — J44.1 COPD EXACERBATION (H): Primary | ICD-10-CM

## 2019-04-04 NOTE — TELEPHONE ENCOUNTER
"Last Written Prescription Date:  1/15/18  Last Fill Quantity: 1 inhaler,  # refills: 3   Last office visit: 3/7/2019 with prescribing provider:  Tasia   Future Office Visit:   Next 5 appointments (look out 90 days)    Jun 07, 2019  9:00 AM CDT  [facility] [address] [id number] [phone]   Jun 07, 2019 10:45 AM CDT  Return Visit with Abimael Fatima MD  [facility] [address]  [facility] [id number] [phone] OPT 2         No flowsheet data found.    Requested Prescriptions   Pending Prescriptions Disp Refills     INCRUSE ELLIPTA 62.5 MCG/INH Inhaler [Pharmacy Med Name: INCRUSE ELLIPTA 62.5 MCG INH]  0     Sig: INHALE 1 PUFF INTO THE LUNGS DAILY    Asthma Maintenance Inhalers - Anticholinergics Failed - 4/3/2019 11:21 AM       Failed - Asthma control assessment score within normal limits in last 6 months    Please review ACT score.          Passed - Patient is age 12 years or older       Passed - Medication is active on med list       Passed - Recent (6 mo) or future (30 days) visit within the authorizing provider's specialty    Patient had office visit in the last 6 months or has a visit in the next 30 days with authorizing provider or within the authorizing provider's specialty.  See \"Patient Info\" tab in inbasket, or \"Choose Columns\" in Meds & Orders section of the refill encounter.              "

## 2019-04-04 NOTE — RESULT ENCOUNTER NOTE
Per our phone call this morning:  Please schedule appt with me tomorrow morning to get repeat wbc, recheck COPD and obtain urine spec from in dwelling catheter

## 2019-04-05 ENCOUNTER — OFFICE VISIT (OUTPATIENT)
Dept: FAMILY MEDICINE | Facility: CLINIC | Age: 74
End: 2019-04-05
Payer: COMMERCIAL

## 2019-04-05 ENCOUNTER — ANCILLARY PROCEDURE (OUTPATIENT)
Dept: GENERAL RADIOLOGY | Facility: CLINIC | Age: 74
End: 2019-04-05
Attending: NURSE PRACTITIONER
Payer: COMMERCIAL

## 2019-04-05 VITALS
TEMPERATURE: 97.4 F | BODY MASS INDEX: 25.64 KG/M2 | SYSTOLIC BLOOD PRESSURE: 131 MMHG | OXYGEN SATURATION: 94 % | DIASTOLIC BLOOD PRESSURE: 68 MMHG | HEIGHT: 70 IN | HEART RATE: 86 BPM | WEIGHT: 179.1 LBS

## 2019-04-05 DIAGNOSIS — N30.01 ACUTE CYSTITIS WITH HEMATURIA: ICD-10-CM

## 2019-04-05 DIAGNOSIS — J44.1 COPD EXACERBATION (H): ICD-10-CM

## 2019-04-05 DIAGNOSIS — N41.0 ACUTE PROSTATITIS: Primary | ICD-10-CM

## 2019-04-05 DIAGNOSIS — J44.9 CHRONIC OBSTRUCTIVE PULMONARY DISEASE, UNSPECIFIED COPD TYPE (H): ICD-10-CM

## 2019-04-05 LAB
ALBUMIN UR-MCNC: NEGATIVE MG/DL
APPEARANCE UR: CLEAR
BACTERIA #/AREA URNS HPF: ABNORMAL /HPF
BASOPHILS # BLD AUTO: 0.1 10E9/L (ref 0–0.2)
BASOPHILS NFR BLD AUTO: 0.8 %
BILIRUB UR QL STRIP: NEGATIVE
COLOR UR AUTO: YELLOW
DIFFERENTIAL METHOD BLD: ABNORMAL
EOSINOPHIL # BLD AUTO: 1.8 10E9/L (ref 0–0.7)
EOSINOPHIL NFR BLD AUTO: 12.8 %
GLUCOSE UR STRIP-MCNC: NEGATIVE MG/DL
HGB UR QL STRIP: ABNORMAL
KETONES UR STRIP-MCNC: NEGATIVE MG/DL
LEUKOCYTE ESTERASE UR QL STRIP: NEGATIVE
LYMPHOCYTES # BLD AUTO: 1.1 10E9/L (ref 0.8–5.3)
LYMPHOCYTES NFR BLD AUTO: 8 %
MONOCYTES # BLD AUTO: 1.3 10E9/L (ref 0–1.3)
MONOCYTES NFR BLD AUTO: 9.2 %
NEUTROPHILS # BLD AUTO: 9.8 10E9/L (ref 1.6–8.3)
NEUTROPHILS NFR BLD AUTO: 69.2 %
NITRATE UR QL: POSITIVE
PH UR STRIP: 7.5 PH (ref 5–7)
RBC #/AREA URNS AUTO: ABNORMAL /HPF
SOURCE: ABNORMAL
SP GR UR STRIP: 1.01 (ref 1–1.03)
UROBILINOGEN UR STRIP-ACNC: 0.2 EU/DL (ref 0.2–1)
WBC # BLD AUTO: 14.2 10E9/L (ref 4–11)
WBC #/AREA URNS AUTO: ABNORMAL /HPF

## 2019-04-05 PROCEDURE — 81001 URINALYSIS AUTO W/SCOPE: CPT | Performed by: NURSE PRACTITIONER

## 2019-04-05 PROCEDURE — 71046 X-RAY EXAM CHEST 2 VIEWS: CPT

## 2019-04-05 PROCEDURE — 85004 AUTOMATED DIFF WBC COUNT: CPT | Performed by: NURSE PRACTITIONER

## 2019-04-05 PROCEDURE — 85048 AUTOMATED LEUKOCYTE COUNT: CPT | Performed by: NURSE PRACTITIONER

## 2019-04-05 PROCEDURE — 87086 URINE CULTURE/COLONY COUNT: CPT | Performed by: NURSE PRACTITIONER

## 2019-04-05 PROCEDURE — 99214 OFFICE O/P EST MOD 30 MIN: CPT | Performed by: NURSE PRACTITIONER

## 2019-04-05 PROCEDURE — 36415 COLL VENOUS BLD VENIPUNCTURE: CPT | Performed by: NURSE PRACTITIONER

## 2019-04-05 RX ORDER — CEFUROXIME AXETIL 500 MG/1
500 TABLET ORAL 2 TIMES DAILY
Qty: 20 TABLET | Refills: 0 | Status: SHIPPED | OUTPATIENT
Start: 2019-04-05 | End: 2019-04-22

## 2019-04-05 RX ORDER — PREDNISONE 20 MG/1
20 TABLET ORAL DAILY
Qty: 5 TABLET | Refills: 0 | Status: SHIPPED | OUTPATIENT
Start: 2019-04-05 | End: 2019-04-12

## 2019-04-05 ASSESSMENT — MIFFLIN-ST. JEOR: SCORE: 1573.64

## 2019-04-05 NOTE — PATIENT INSTRUCTIONS
Continue the albuterol or levalbuterol neb every 6 hours  symbicort twice a day  Take the antibiotic twice a day  And take the prednisone once a day  Follow up with Dr Dozier next 4/11/19
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
## 2019-04-05 NOTE — LETTER
[address] [facility]  [address]  [place]  [address]  Tel: [phone]    April 8, 2019    Harrison Thomas  [address] [facility] [id number]        Dear  William,    This chest xray is somewhat different than previous xray , Harrison.  Dr Dozier will review this with you when you see her next week.  Hopefully the antibiotic will make a difference.  We want your lungs to be functioning optimally when you go to surgery ;     If you have any further questions or problems, please contact our office.      Sincerely,    Guillermina Zelaya NP/SML

## 2019-04-06 LAB
BACTERIA SPEC CULT: NORMAL
BACTERIA SPEC CULT: NORMAL
SPECIMEN SOURCE: NORMAL

## 2019-04-06 NOTE — RESULT ENCOUNTER NOTE
This chest xray is somewhat different than previous xray Harrison.  Dr Dozier will review this with you when you see her next week.  Hopefully the antibiotic will make a difference.  We want your lungs to be functioning optimally when you go to surgery ; )

## 2019-04-08 ENCOUNTER — TELEPHONE (OUTPATIENT)
Dept: UROLOGY | Facility: CLINIC | Age: 74
End: 2019-04-08

## 2019-04-08 DIAGNOSIS — N39.0 URINARY TRACT INFECTION: Primary | ICD-10-CM

## 2019-04-08 RX ORDER — SULFAMETHOXAZOLE/TRIMETHOPRIM 800-160 MG
1 TABLET ORAL 2 TIMES DAILY
Qty: 15 TABLET | Refills: 0 | Status: SHIPPED | OUTPATIENT
Start: 2019-04-08 | End: 2019-04-22

## 2019-04-08 NOTE — TELEPHONE ENCOUNTER
----- Message from Jerry Horvath MD sent at 4/8/2019 11:51 AM CDT -----  OK - sounds good, thanks    ----- Message -----  From: Eliza Richter LPN  Sent: 4/8/2019   9:03 AM  To: Jerry Horvath MD    Good morning ryan is out today I am looking at her tasks I saw this one and called patient and ordered the medication and told him to start this on Monday April 15th  Just fyben --he is on ceptin for lung infection sounded sick but he will be done with this prior to our medication.  eliza  ----- Message -----  From: Eliza Richter LPN  Sent: 4/8/2019   8:56 AM  To: Eliza Richter LPN        ----- Message -----  From: Jerry Horvath MD  Sent: 4/5/2019  12:30 AM  To: Ryan Quinn RN    Good pickup lets do BActrim 1 DS tab PO BID x 7 days starting Monday prior to surgery.    Thanks   Celso  ----- Message -----  From: Ryan Quinn RN  Sent: 4/4/2019  10:34 AM  To: Jerry Horvath MD    Harrison had a pre-op done on April 1st.  I tried to enter the order for the Cipro but it would let me because he has an allergic reaction to Levaquin. Do you want him on something else instead of Cipro?   ----- Message -----  From: Jerry Horvath MD  Sent: 3/30/2019  11:02 PM  To: Ryan Quinn RN    This patient was scheduled for HoLEP in 2 weeks after he left the office.  Can you make juliet brooklyn has a preop?  Also, lets get him a 7 day course of cipro 500 BID starting Monday of surgery.    Thanks  Celso

## 2019-04-08 NOTE — RESULT ENCOUNTER NOTE
I think the antibiotic will help this infection , Harrison, as well as working for your lungs as well.  Dr Dozier will assess again on the 12th

## 2019-04-12 ENCOUNTER — OFFICE VISIT (OUTPATIENT)
Dept: FAMILY MEDICINE | Facility: CLINIC | Age: 74
End: 2019-04-12
Payer: COMMERCIAL

## 2019-04-12 ENCOUNTER — ANCILLARY PROCEDURE (OUTPATIENT)
Dept: GENERAL RADIOLOGY | Facility: CLINIC | Age: 74
End: 2019-04-12
Attending: INTERNAL MEDICINE
Payer: COMMERCIAL

## 2019-04-12 VITALS
BODY MASS INDEX: 25.34 KG/M2 | WEIGHT: 177 LBS | TEMPERATURE: 97.6 F | HEART RATE: 78 BPM | HEIGHT: 70 IN | SYSTOLIC BLOOD PRESSURE: 150 MMHG | DIASTOLIC BLOOD PRESSURE: 80 MMHG | OXYGEN SATURATION: 92 %

## 2019-04-12 DIAGNOSIS — M79.89 SWELLING OF BOTH HANDS: ICD-10-CM

## 2019-04-12 DIAGNOSIS — M48.062 SPINAL STENOSIS OF LUMBAR REGION WITH NEUROGENIC CLAUDICATION: ICD-10-CM

## 2019-04-12 DIAGNOSIS — N40.1 BENIGN PROSTATIC HYPERPLASIA WITH INCOMPLETE BLADDER EMPTYING: ICD-10-CM

## 2019-04-12 DIAGNOSIS — J44.1 COPD EXACERBATION (H): Primary | ICD-10-CM

## 2019-04-12 DIAGNOSIS — I10 BENIGN ESSENTIAL HYPERTENSION: ICD-10-CM

## 2019-04-12 DIAGNOSIS — R39.14 BENIGN PROSTATIC HYPERPLASIA WITH INCOMPLETE BLADDER EMPTYING: ICD-10-CM

## 2019-04-12 LAB
CRP SERPL-MCNC: 14 MG/L (ref 0–8)
ERYTHROCYTE [SEDIMENTATION RATE] IN BLOOD BY WESTERGREN METHOD: 15 MM/H (ref 0–20)
URATE SERPL-MCNC: 5.4 MG/DL (ref 3.5–7.2)

## 2019-04-12 PROCEDURE — 86140 C-REACTIVE PROTEIN: CPT | Performed by: INTERNAL MEDICINE

## 2019-04-12 PROCEDURE — 86431 RHEUMATOID FACTOR QUANT: CPT | Performed by: INTERNAL MEDICINE

## 2019-04-12 PROCEDURE — 85652 RBC SED RATE AUTOMATED: CPT | Performed by: INTERNAL MEDICINE

## 2019-04-12 PROCEDURE — 36415 COLL VENOUS BLD VENIPUNCTURE: CPT | Performed by: INTERNAL MEDICINE

## 2019-04-12 PROCEDURE — 84550 ASSAY OF BLOOD/URIC ACID: CPT | Performed by: INTERNAL MEDICINE

## 2019-04-12 PROCEDURE — 86200 CCP ANTIBODY: CPT | Performed by: INTERNAL MEDICINE

## 2019-04-12 PROCEDURE — 99214 OFFICE O/P EST MOD 30 MIN: CPT | Performed by: INTERNAL MEDICINE

## 2019-04-12 PROCEDURE — 73130 X-RAY EXAM OF HAND: CPT | Mod: LT

## 2019-04-12 RX ORDER — FUROSEMIDE 20 MG
10 TABLET ORAL DAILY
Qty: 15 TABLET | Refills: 3 | Status: SHIPPED | OUTPATIENT
Start: 2019-04-12 | End: 2019-08-15

## 2019-04-12 RX ORDER — HYDROCODONE BITARTRATE AND ACETAMINOPHEN 5; 325 MG/1; MG/1
1 TABLET ORAL EVERY 4 HOURS PRN
Qty: 180 TABLET | Refills: 0 | Status: SHIPPED | OUTPATIENT
Start: 2019-04-12 | End: 2019-06-13

## 2019-04-12 RX ORDER — AMLODIPINE BESYLATE 5 MG/1
5 TABLET ORAL 2 TIMES DAILY
Qty: 180 TABLET | Refills: 3 | Status: SHIPPED | OUTPATIENT
Start: 2019-04-12 | End: 2019-07-10

## 2019-04-12 ASSESSMENT — MIFFLIN-ST. JEOR: SCORE: 1564.12
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
## 2019-04-12 NOTE — PATIENT INSTRUCTIONS
ADD LASIX 10 MG EACH MORNING  INCREASE NORVASC TO 5 MG TWICE DAILY     PERIOPERATIVE STEROIDS WILL BE NEEDED

## 2019-04-13 ENCOUNTER — TELEPHONE (OUTPATIENT)
Dept: SURGERY | Facility: CLINIC | Age: 74
End: 2019-04-13

## 2019-04-13 ENCOUNTER — NURSE TRIAGE (OUTPATIENT)
Dept: NURSING | Facility: CLINIC | Age: 74
End: 2019-04-13

## 2019-04-13 DIAGNOSIS — N40.0 ENLARGED PROSTATE: Primary | ICD-10-CM

## 2019-04-13 NOTE — TELEPHONE ENCOUNTER
Reason for call : Miss directed call from Deborah came in for Addison but called said call was  for [facility] , and  call will page on-call for Pt assistance  Instead , so call will not be billed to Addison that was on phone for contract.    Lupe Barcenas RN  - [place] Nurse Advisor

## 2019-04-13 NOTE — TELEPHONE ENCOUNTER
Patient calls with reports of sore/scratchy throat after starting Bactrim. Offered the patient evaluation in his closest urgent care or in our ED. Patient believes that his symptoms are mild. He would like to change antibiotics however. Will prescribe Augmentin BID to be taken until time of surgery.     Discussed with chief resident, Dr. Marla Daniels MD  Urology Resident PGY-4

## 2019-04-15 LAB — RHEUMATOID FACT SER NEPH-ACNC: 55 IU/ML (ref 0–20)

## 2019-04-16 LAB — CCP AB SER IA-ACNC: 2 U/ML

## 2019-04-17 ENCOUNTER — ANESTHESIA EVENT (OUTPATIENT)
Dept: SURGERY | Facility: CLINIC | Age: 74
End: 2019-04-17
Payer: COMMERCIAL

## 2019-04-17 ASSESSMENT — COPD QUESTIONNAIRES
COPD: 1
CAT_SEVERITY: MODERATE

## 2019-04-18 ENCOUNTER — ANESTHESIA (OUTPATIENT)
Dept: SURGERY | Facility: CLINIC | Age: 74
End: 2019-04-18
Payer: COMMERCIAL

## 2019-04-18 ENCOUNTER — HOSPITAL ENCOUNTER (OUTPATIENT)
Facility: CLINIC | Age: 74
Discharge: HOME | End: 2019-04-19
Attending: UROLOGY | Admitting: UROLOGY
Payer: COMMERCIAL

## 2019-04-18 DIAGNOSIS — N40.0 ENLARGED PROSTATE: ICD-10-CM

## 2019-04-18 LAB
ABO + RH BLD: NORMAL
ABO + RH BLD: NORMAL
ANION GAP SERPL CALCULATED.3IONS-SCNC: 6 MMOL/L (ref 3–14)
BASOPHILS # BLD AUTO: 0 10E9/L (ref 0–0.2)
BASOPHILS NFR BLD AUTO: 0.4 %
BLD GP AB SCN SERPL QL: NORMAL
BLOOD BANK CMNT PATIENT-IMP: NORMAL
BUN SERPL-MCNC: 17 MG/DL (ref 7–30)
CALCIUM SERPL-MCNC: 8 MG/DL (ref 8.5–10.1)
CHLORIDE SERPL-SCNC: 107 MMOL/L (ref 94–109)
CO2 SERPL-SCNC: 28 MMOL/L (ref 20–32)
CREAT SERPL-MCNC: 0.84 MG/DL (ref 0.66–1.25)
DIFFERENTIAL METHOD BLD: ABNORMAL
EOSINOPHIL # BLD AUTO: 0.4 10E9/L (ref 0–0.7)
EOSINOPHIL NFR BLD AUTO: 4.8 %
ERYTHROCYTE [DISTWIDTH] IN BLOOD BY AUTOMATED COUNT: 13.7 % (ref 10–15)
GFR SERPL CREATININE-BSD FRML MDRD: 88 ML/MIN/{1.73_M2}
GLUCOSE SERPL-MCNC: 110 MG/DL (ref 70–99)
GLUCOSE SERPL-MCNC: 97 MG/DL (ref 70–99)
HCT VFR BLD AUTO: 40.1 % (ref 40–53)
HGB BLD-MCNC: 12.9 G/DL (ref 13.3–17.7)
HGB BLD-MCNC: 14.5 G/DL (ref 13.3–17.7)
IMM GRANULOCYTES # BLD: 0 10E9/L (ref 0–0.4)
IMM GRANULOCYTES NFR BLD: 0.4 %
LYMPHOCYTES # BLD AUTO: 1.3 10E9/L (ref 0.8–5.3)
LYMPHOCYTES NFR BLD AUTO: 13.8 %
MCH RBC QN AUTO: 29.2 PG (ref 26.5–33)
MCHC RBC AUTO-ENTMCNC: 32.2 G/DL (ref 31.5–36.5)
MCV RBC AUTO: 91 FL (ref 78–100)
MONOCYTES # BLD AUTO: 1.3 10E9/L (ref 0–1.3)
MONOCYTES NFR BLD AUTO: 14.1 %
NEUTROPHILS # BLD AUTO: 6 10E9/L (ref 1.6–8.3)
NEUTROPHILS NFR BLD AUTO: 66.5 %
NRBC # BLD AUTO: 0 10*3/UL
NRBC BLD AUTO-RTO: 0 /100
PLATELET # BLD AUTO: 230 10E9/L (ref 150–450)
POTASSIUM SERPL-SCNC: 4.1 MMOL/L (ref 3.4–5.3)
RBC # BLD AUTO: 4.42 10E12/L (ref 4.4–5.9)
SODIUM SERPL-SCNC: 141 MMOL/L (ref 133–144)
SPECIMEN EXP DATE BLD: NORMAL
WBC # BLD AUTO: 9.1 10E9/L (ref 4–11)

## 2019-04-18 PROCEDURE — 25000132 ZZH RX MED GY IP 250 OP 250 PS 637: Performed by: STUDENT IN AN ORGANIZED HEALTH CARE EDUCATION/TRAINING PROGRAM

## 2019-04-18 PROCEDURE — 86900 BLOOD TYPING SEROLOGIC ABO: CPT | Performed by: UROLOGY

## 2019-04-18 PROCEDURE — 88305 TISSUE EXAM BY PATHOLOGIST: CPT | Mod: 26 | Performed by: UROLOGY

## 2019-04-18 PROCEDURE — 71000015 ZZH RECOVERY PHASE 1 LEVEL 2 EA ADDTL HR: Performed by: UROLOGY

## 2019-04-18 PROCEDURE — 88312 SPECIAL STAINS GROUP 1: CPT | Performed by: UROLOGY

## 2019-04-18 PROCEDURE — 36415 COLL VENOUS BLD VENIPUNCTURE: CPT | Performed by: STUDENT IN AN ORGANIZED HEALTH CARE EDUCATION/TRAINING PROGRAM

## 2019-04-18 PROCEDURE — 36415 COLL VENOUS BLD VENIPUNCTURE: CPT | Performed by: ANESTHESIOLOGY

## 2019-04-18 PROCEDURE — 25800030 ZZH RX IP 258 OP 636: Performed by: UROLOGY

## 2019-04-18 PROCEDURE — 25000125 ZZHC RX 250: Performed by: STUDENT IN AN ORGANIZED HEALTH CARE EDUCATION/TRAINING PROGRAM

## 2019-04-18 PROCEDURE — 85018 HEMOGLOBIN: CPT | Performed by: ANESTHESIOLOGY

## 2019-04-18 PROCEDURE — 25800030 ZZH RX IP 258 OP 636: Performed by: STUDENT IN AN ORGANIZED HEALTH CARE EDUCATION/TRAINING PROGRAM

## 2019-04-18 PROCEDURE — 37000008 ZZH ANESTHESIA TECHNICAL FEE, 1ST 30 MIN: Performed by: UROLOGY

## 2019-04-18 PROCEDURE — C1758 CATHETER, URETERAL: HCPCS | Performed by: UROLOGY

## 2019-04-18 PROCEDURE — 25000132 ZZH RX MED GY IP 250 OP 250 PS 637: Performed by: UROLOGY

## 2019-04-18 PROCEDURE — 86901 BLOOD TYPING SEROLOGIC RH(D): CPT | Performed by: UROLOGY

## 2019-04-18 PROCEDURE — 85025 COMPLETE CBC W/AUTO DIFF WBC: CPT | Performed by: STUDENT IN AN ORGANIZED HEALTH CARE EDUCATION/TRAINING PROGRAM

## 2019-04-18 PROCEDURE — 25800030 ZZH RX IP 258 OP 636: Performed by: ANESTHESIOLOGY

## 2019-04-18 PROCEDURE — 37000009 ZZH ANESTHESIA TECHNICAL FEE, EACH ADDTL 15 MIN: Performed by: UROLOGY

## 2019-04-18 PROCEDURE — A9270 NON-COVERED ITEM OR SERVICE: HCPCS | Performed by: STUDENT IN AN ORGANIZED HEALTH CARE EDUCATION/TRAINING PROGRAM

## 2019-04-18 PROCEDURE — 27210794 ZZH OR GENERAL SUPPLY STERILE: Performed by: UROLOGY

## 2019-04-18 PROCEDURE — 36000062 ZZH SURGERY LEVEL 4 1ST 30 MIN - UMMC: Performed by: UROLOGY

## 2019-04-18 PROCEDURE — 80048 BASIC METABOLIC PNL TOTAL CA: CPT | Performed by: STUDENT IN AN ORGANIZED HEALTH CARE EDUCATION/TRAINING PROGRAM

## 2019-04-18 PROCEDURE — 82947 ASSAY GLUCOSE BLOOD QUANT: CPT | Performed by: ANESTHESIOLOGY

## 2019-04-18 PROCEDURE — 36000064 ZZH SURGERY LEVEL 4 EA 15 ADDTL MIN - UMMC: Performed by: UROLOGY

## 2019-04-18 PROCEDURE — 86850 RBC ANTIBODY SCREEN: CPT | Performed by: UROLOGY

## 2019-04-18 PROCEDURE — A9270 NON-COVERED ITEM OR SERVICE: HCPCS | Performed by: UROLOGY

## 2019-04-18 PROCEDURE — 71000014 ZZH RECOVERY PHASE 1 LEVEL 2 FIRST HR: Performed by: UROLOGY

## 2019-04-18 PROCEDURE — 27211024 ZZHC OR SUPPLY OTHER OPNP: Performed by: UROLOGY

## 2019-04-18 PROCEDURE — 88305 TISSUE EXAM BY PATHOLOGIST: CPT | Performed by: UROLOGY

## 2019-04-18 PROCEDURE — 40000170 ZZH STATISTIC PRE-PROCEDURE ASSESSMENT II: Performed by: UROLOGY

## 2019-04-18 PROCEDURE — 25000128 H RX IP 250 OP 636: Performed by: STUDENT IN AN ORGANIZED HEALTH CARE EDUCATION/TRAINING PROGRAM

## 2019-04-18 PROCEDURE — 25000128 H RX IP 250 OP 636: Performed by: UROLOGY

## 2019-04-18 RX ORDER — ACETAMINOPHEN 325 MG/1
975 TABLET ORAL ONCE
Status: COMPLETED | OUTPATIENT
Start: 2019-04-18 | End: 2019-04-18

## 2019-04-18 RX ORDER — ROSUVASTATIN CALCIUM 5 MG/1
10 TABLET, COATED ORAL EVERY EVENING
Status: DISCONTINUED | OUTPATIENT
Start: 2019-04-18 | End: 2019-04-19 | Stop reason: HOSPADM

## 2019-04-18 RX ORDER — ONDANSETRON 2 MG/ML
INJECTION INTRAMUSCULAR; INTRAVENOUS PRN
Status: DISCONTINUED | OUTPATIENT
Start: 2019-04-18 | End: 2019-04-18

## 2019-04-18 RX ORDER — ONDANSETRON 4 MG/1
4 TABLET, ORALLY DISINTEGRATING ORAL EVERY 6 HOURS PRN
Status: DISCONTINUED | OUTPATIENT
Start: 2019-04-18 | End: 2019-04-19 | Stop reason: HOSPADM

## 2019-04-18 RX ORDER — ATROPA BELLADONNA AND OPIUM 16.2; 3 MG/1; MG/1
30 SUPPOSITORY RECTAL 3 TIMES DAILY PRN
Status: DISCONTINUED | OUTPATIENT
Start: 2019-04-18 | End: 2019-04-19 | Stop reason: HOSPADM

## 2019-04-18 RX ORDER — METOPROLOL SUCCINATE 25 MG/1
25 TABLET, EXTENDED RELEASE ORAL DAILY
Status: DISCONTINUED | OUTPATIENT
Start: 2019-04-18 | End: 2019-04-19 | Stop reason: HOSPADM

## 2019-04-18 RX ORDER — SODIUM CHLORIDE, SODIUM LACTATE, POTASSIUM CHLORIDE, CALCIUM CHLORIDE 600; 310; 30; 20 MG/100ML; MG/100ML; MG/100ML; MG/100ML
INJECTION, SOLUTION INTRAVENOUS CONTINUOUS
Status: DISCONTINUED | OUTPATIENT
Start: 2019-04-18 | End: 2019-04-18 | Stop reason: HOSPADM

## 2019-04-18 RX ORDER — FENTANYL CITRATE 50 UG/ML
25-50 INJECTION, SOLUTION INTRAMUSCULAR; INTRAVENOUS
Status: DISCONTINUED | OUTPATIENT
Start: 2019-04-18 | End: 2019-04-18 | Stop reason: HOSPADM

## 2019-04-18 RX ORDER — NALOXONE HYDROCHLORIDE 0.4 MG/ML
.1-.4 INJECTION, SOLUTION INTRAMUSCULAR; INTRAVENOUS; SUBCUTANEOUS
Status: DISCONTINUED | OUTPATIENT
Start: 2019-04-18 | End: 2019-04-19

## 2019-04-18 RX ORDER — ASPIRIN 81 MG/1
81 TABLET, CHEWABLE ORAL 2 TIMES DAILY
Status: DISCONTINUED | OUTPATIENT
Start: 2019-04-18 | End: 2019-04-19 | Stop reason: HOSPADM

## 2019-04-18 RX ORDER — PROPOFOL 10 MG/ML
INJECTION, EMULSION INTRAVENOUS PRN
Status: DISCONTINUED | OUTPATIENT
Start: 2019-04-18 | End: 2019-04-18

## 2019-04-18 RX ORDER — HYDROMORPHONE HYDROCHLORIDE 1 MG/ML
0.2 INJECTION, SOLUTION INTRAMUSCULAR; INTRAVENOUS; SUBCUTANEOUS
Status: DISCONTINUED | OUTPATIENT
Start: 2019-04-18 | End: 2019-04-19 | Stop reason: HOSPADM

## 2019-04-18 RX ORDER — SODIUM CHLORIDE 9 MG/ML
INJECTION, SOLUTION INTRAVENOUS CONTINUOUS
Status: DISCONTINUED | OUTPATIENT
Start: 2019-04-18 | End: 2019-04-19 | Stop reason: HOSPADM

## 2019-04-18 RX ORDER — AMPICILLIN SODIUM 1 G/1
2 INJECTION, POWDER, FOR SOLUTION INTRAMUSCULAR; INTRAVENOUS EVERY 6 HOURS
Status: DISCONTINUED | OUTPATIENT
Start: 2019-04-18 | End: 2019-04-18 | Stop reason: DRUGHIGH

## 2019-04-18 RX ORDER — BUPIVACAINE HYDROCHLORIDE 7.5 MG/ML
INJECTION, SOLUTION INTRASPINAL PRN
Status: DISCONTINUED | OUTPATIENT
Start: 2019-04-18 | End: 2019-04-18

## 2019-04-18 RX ORDER — GABAPENTIN 100 MG/1
300 CAPSULE ORAL ONCE
Status: COMPLETED | OUTPATIENT
Start: 2019-04-18 | End: 2019-04-18

## 2019-04-18 RX ORDER — ONDANSETRON 2 MG/ML
4 INJECTION INTRAMUSCULAR; INTRAVENOUS EVERY 6 HOURS PRN
Status: DISCONTINUED | OUTPATIENT
Start: 2019-04-18 | End: 2019-04-19 | Stop reason: HOSPADM

## 2019-04-18 RX ORDER — ALFUZOSIN HYDROCHLORIDE 10 MG/1
10 TABLET, EXTENDED RELEASE ORAL
Status: DISCONTINUED | OUTPATIENT
Start: 2019-04-19 | End: 2019-04-19 | Stop reason: HOSPADM

## 2019-04-18 RX ORDER — ALBUTEROL SULFATE 90 UG/1
1-2 AEROSOL, METERED RESPIRATORY (INHALATION)
Status: DISCONTINUED | OUTPATIENT
Start: 2019-04-18 | End: 2019-04-19 | Stop reason: HOSPADM

## 2019-04-18 RX ORDER — LABETALOL 20 MG/4 ML (5 MG/ML) INTRAVENOUS SYRINGE
10
Status: DISCONTINUED | OUTPATIENT
Start: 2019-04-18 | End: 2019-04-18 | Stop reason: HOSPADM

## 2019-04-18 RX ORDER — NALOXONE HYDROCHLORIDE 0.4 MG/ML
.1-.4 INJECTION, SOLUTION INTRAMUSCULAR; INTRAVENOUS; SUBCUTANEOUS
Status: DISCONTINUED | OUTPATIENT
Start: 2019-04-18 | End: 2019-04-19 | Stop reason: HOSPADM

## 2019-04-18 RX ORDER — LIDOCAINE 40 MG/G
CREAM TOPICAL
Status: DISCONTINUED | OUTPATIENT
Start: 2019-04-18 | End: 2019-04-19 | Stop reason: HOSPADM

## 2019-04-18 RX ORDER — AMPICILLIN 2 G/1
2 INJECTION, POWDER, FOR SOLUTION INTRAVENOUS ONCE
Status: COMPLETED | OUTPATIENT
Start: 2019-04-18 | End: 2019-04-18

## 2019-04-18 RX ORDER — ONDANSETRON 4 MG/1
4 TABLET, ORALLY DISINTEGRATING ORAL EVERY 30 MIN PRN
Status: DISCONTINUED | OUTPATIENT
Start: 2019-04-18 | End: 2019-04-18 | Stop reason: HOSPADM

## 2019-04-18 RX ORDER — AMPICILLIN 2 G/1
2 INJECTION, POWDER, FOR SOLUTION INTRAVENOUS EVERY 6 HOURS
Status: DISCONTINUED | OUTPATIENT
Start: 2019-04-18 | End: 2019-04-19 | Stop reason: HOSPADM

## 2019-04-18 RX ORDER — NEOMYCIN/BACITRACIN/POLYMYXINB 3.5-400-5K
OINTMENT (GRAM) TOPICAL 4 TIMES DAILY PRN
Status: DISCONTINUED | OUTPATIENT
Start: 2019-04-18 | End: 2019-04-19 | Stop reason: HOSPADM

## 2019-04-18 RX ORDER — FUROSEMIDE 10 MG/ML
INJECTION INTRAMUSCULAR; INTRAVENOUS PRN
Status: DISCONTINUED | OUTPATIENT
Start: 2019-04-18 | End: 2019-04-18

## 2019-04-18 RX ORDER — ONDANSETRON 2 MG/ML
4 INJECTION INTRAMUSCULAR; INTRAVENOUS EVERY 30 MIN PRN
Status: DISCONTINUED | OUTPATIENT
Start: 2019-04-18 | End: 2019-04-18 | Stop reason: HOSPADM

## 2019-04-18 RX ORDER — LIDOCAINE 50 MG/G
OINTMENT TOPICAL 4 TIMES DAILY PRN
Status: DISCONTINUED | OUTPATIENT
Start: 2019-04-18 | End: 2019-04-19 | Stop reason: HOSPADM

## 2019-04-18 RX ORDER — PROPOFOL 10 MG/ML
INJECTION, EMULSION INTRAVENOUS CONTINUOUS PRN
Status: DISCONTINUED | OUTPATIENT
Start: 2019-04-18 | End: 2019-04-18

## 2019-04-18 RX ORDER — ACETAMINOPHEN 325 MG/1
650 TABLET ORAL EVERY 6 HOURS
Status: DISCONTINUED | OUTPATIENT
Start: 2019-04-18 | End: 2019-04-19 | Stop reason: HOSPADM

## 2019-04-18 RX ORDER — OXYCODONE HYDROCHLORIDE 5 MG/1
5-10 TABLET ORAL
Status: DISCONTINUED | OUTPATIENT
Start: 2019-04-18 | End: 2019-04-19 | Stop reason: HOSPADM

## 2019-04-18 RX ORDER — ROFLUMILAST 500 UG/1
500 TABLET ORAL DAILY
Status: DISCONTINUED | OUTPATIENT
Start: 2019-04-18 | End: 2019-04-19 | Stop reason: HOSPADM

## 2019-04-18 RX ORDER — AMLODIPINE BESYLATE 5 MG/1
5 TABLET ORAL 2 TIMES DAILY
Status: DISCONTINUED | OUTPATIENT
Start: 2019-04-18 | End: 2019-04-19 | Stop reason: HOSPADM

## 2019-04-18 RX ORDER — SODIUM CHLORIDE 9 MG/ML
INJECTION, SOLUTION INTRAVENOUS
Status: DISPENSED
Start: 2019-04-18 | End: 2019-04-19

## 2019-04-18 RX ORDER — LISINOPRIL 20 MG/1
40 TABLET ORAL DAILY
Status: DISCONTINUED | OUTPATIENT
Start: 2019-04-18 | End: 2019-04-19 | Stop reason: HOSPADM

## 2019-04-18 RX ADMIN — LISINOPRIL 40 MG: 20 TABLET ORAL at 19:06

## 2019-04-18 RX ADMIN — PROPOFOL 20 MG: 10 INJECTION, EMULSION INTRAVENOUS at 13:26

## 2019-04-18 RX ADMIN — PROPOFOL 20 MG: 10 INJECTION, EMULSION INTRAVENOUS at 12:49

## 2019-04-18 RX ADMIN — SODIUM CHLORIDE, POTASSIUM CHLORIDE, SODIUM LACTATE AND CALCIUM CHLORIDE: 600; 310; 30; 20 INJECTION, SOLUTION INTRAVENOUS at 12:31

## 2019-04-18 RX ADMIN — GENTAMICIN SULFATE 300 MG: 40 INJECTION, SOLUTION INTRAMUSCULAR; INTRAVENOUS at 12:54

## 2019-04-18 RX ADMIN — PHENYLEPHRINE HYDROCHLORIDE 100 MCG: 10 INJECTION, SOLUTION INTRAMUSCULAR; INTRAVENOUS; SUBCUTANEOUS at 13:41

## 2019-04-18 RX ADMIN — PHENYLEPHRINE HYDROCHLORIDE 100 MCG: 10 INJECTION, SOLUTION INTRAMUSCULAR; INTRAVENOUS; SUBCUTANEOUS at 13:10

## 2019-04-18 RX ADMIN — AMLODIPINE BESYLATE 5 MG: 5 TABLET ORAL at 20:45

## 2019-04-18 RX ADMIN — ASPIRIN 81 MG CHEWABLE TABLET 81 MG: 81 TABLET CHEWABLE at 20:45

## 2019-04-18 RX ADMIN — PHENYLEPHRINE HYDROCHLORIDE 100 MCG: 10 INJECTION, SOLUTION INTRAMUSCULAR; INTRAVENOUS; SUBCUTANEOUS at 12:48

## 2019-04-18 RX ADMIN — METOPROLOL SUCCINATE 25 MG: 25 TABLET, EXTENDED RELEASE ORAL at 19:06

## 2019-04-18 RX ADMIN — ROFLUMILAST 500 MCG: 500 TABLET ORAL at 20:47

## 2019-04-18 RX ADMIN — AMPICILLIN 2 G: 2 INJECTION, POWDER, FOR SOLUTION INTRAVENOUS at 22:56

## 2019-04-18 RX ADMIN — PHENYLEPHRINE HYDROCHLORIDE 100 MCG: 10 INJECTION, SOLUTION INTRAMUSCULAR; INTRAVENOUS; SUBCUTANEOUS at 13:24

## 2019-04-18 RX ADMIN — PROPOFOL 20 MG: 10 INJECTION, EMULSION INTRAVENOUS at 13:52

## 2019-04-18 RX ADMIN — HYDROCORTISONE SODIUM SUCCINATE 50 MG: 100 INJECTION, POWDER, FOR SOLUTION INTRAMUSCULAR; INTRAVENOUS at 12:50

## 2019-04-18 RX ADMIN — ONDANSETRON 4 MG: 2 INJECTION INTRAMUSCULAR; INTRAVENOUS at 14:28

## 2019-04-18 RX ADMIN — PROPOFOL 20 MG: 10 INJECTION, EMULSION INTRAVENOUS at 12:52

## 2019-04-18 RX ADMIN — ROSUVASTATIN CALCIUM 10 MG: 5 TABLET, FILM COATED ORAL at 20:46

## 2019-04-18 RX ADMIN — FUROSEMIDE 20 MG: 10 INJECTION, SOLUTION INTRAVENOUS at 13:56

## 2019-04-18 RX ADMIN — ACETAMINOPHEN 650 MG: 325 TABLET, FILM COATED ORAL at 19:05

## 2019-04-18 RX ADMIN — AMPICILLIN SODIUM 2 G: 2 INJECTION, POWDER, FOR SOLUTION INTRAMUSCULAR; INTRAVENOUS at 12:54

## 2019-04-18 RX ADMIN — ACETAMINOPHEN 975 MG: 325 TABLET, FILM COATED ORAL at 10:00

## 2019-04-18 RX ADMIN — PROPOFOL 100 MCG/KG/MIN: 10 INJECTION, EMULSION INTRAVENOUS at 12:40

## 2019-04-18 RX ADMIN — GABAPENTIN 300 MG: 300 CAPSULE ORAL at 10:00

## 2019-04-18 RX ADMIN — BUPIVACAINE HYDROCHLORIDE IN DEXTROSE 1.8 ML: 7.5 INJECTION, SOLUTION SUBARACHNOID at 12:36

## 2019-04-18 ASSESSMENT — MIFFLIN-ST. JEOR: SCORE: 1547.25

## 2019-04-18 NOTE — OR NURSING
PACU to Inpatient Nursing Handoff    Patient Harrison Thomas is a 73 year old male who speaks English.   Procedure Procedure(s):  Holmium Laser Enucleation Of The Prostate   Surgeon(s) Primary: Jerry Horvath MD  Resident - Assisting: Víctor Thompson MD     Allergies   Allergen Reactions     Levaquin Difficulty breathing     Plavix [Clopidogrel Bisulfate] Itching     Atorvastatin Calcium Cramps     lipitor     Cats      Dogs      Hctz [Hydrochlorothiazide]      Rash on legs       Isolation  [unfilled]     Past Medical History   has a past medical history of Allergic rhinitis, cause unspecified (7/8/2005), Arthritis (2019), Back ache, Bruit, CAD (coronary artery disease) (12/29/97), COPD (chronic obstructive pulmonary disease) (H), Essential hypertension, benign (11/11/2003), History of blood transfusion (1964), HTN (hypertension), Hyperlipidemia, Immunodeficiency (H), Immunodeficiency (H), Melanocytic nevi of lip, Mixed hyperlipidemia (11/11/2003), Monoclonal paraproteinemia, Myocardial infarction (H), BRENNEN (obstructive sleep apnea) (8/27/2018), Other chronic pain, PONV (postoperative nausea and vomiting), Retina hole (2014, rt), Syncopal episode (6-09), Thyroid nodule, TIA (transient ischaemic attack) (6-09), and Uncomplicated asthma (2004). He also has no past medical history of Cerebral infarction (H), Congestive heart failure (H), Depressive disorder, or Diabetes (H).    Anesthesia General   Dermatome Level Dermatomes Left: L2  Dermatomes Right: L2   Preop Meds acetaminophen (Tylenol) - time given: 1000  gabapentin (Neurontin) - time given: 1000   Nerve block Not applicable   Intraop Meds ondansetron (Zofran): last given at 1250  forosemide 20mg at 1356, hydrocortisone 50mg at 1250   Local Meds No   Antibiotics ampicillin (Omnipen) - last given at 1254  gentamicin (Garamycin) - last given at 1254     Pain Patient Currently in Pain: denies  Comfort: negligible pain  Pain Control: partially effective   PACU  meds  Not applicable   PCA / epidural No   Capnography Respiratory Monitoring (EtCO2): 31 mmHg   Telemetry ECG Rhythm: Sinus rhythm   Inpatient Telemetry Monitor Ordered? No        Labs Glucose Lab Results   Component Value Date    GLC 97 04/18/2019       Hgb Lab Results   Component Value Date    HGB 14.5 04/18/2019       INR Lab Results   Component Value Date    INR 1.00 06/04/2009      PACU Imaging Not applicable     Wound/Incision     CMS        Equipment cbi   Other LDA       IV Access Peripheral IV 04/18/19 Right Hand (Active)   Site Assessment WDL 4/18/2019  3:45 PM   Line Status Infusing 4/18/2019  3:45 PM   Phlebitis Scale 0-->no symptoms 4/18/2019  3:45 PM   Infiltration Scale 0 4/18/2019  3:45 PM   Infiltration Site Treatment Method  None 4/18/2019  3:45 PM   Number of days: 0      Blood Products Not applicable EBL min mL   Intake/Output Date 04/18/19 0700 - 04/19/19 0659   Shift 1662-7545 5580-7111 3425-8678 24 Hour Total   INTAKE   P.O.  200  200   I.V. 600   600   Shift Total(mL/kg) 600(7.63) 200(2.54)  800(10.18)   OUTPUT   Urine  2600  2600   Shift Total(mL/kg)  2600(33.08)  2600(33.08)   Weight (kg) 78.6 78.6 78.6 78.6      Drains / Martinez Urethral Catheter Non-latex;Straight-tip;Triple-lumen 22 fr (Active)   Collection Container Standard 4/18/2019  3:45 PM   Securement Method Securing device (Describe) 4/18/2019  3:45 PM   Rationale for Continued Use /GI/GYN Pelvic Procedure;Anesthesia 4/18/2019  3:45 PM   Number of days: 0      Time of void PreOp Void Prior to Procedure: (martinez in place) (04/18/19 1019)    PostOp      Diapered? No   Bladder Scan      mL (04/18/19 1545)  tolerating sips     Vitals    B/P: 135/70  T: 97.2  F (36.2  C)    Temp src: Temporal  P:  Pulse: 66 (04/18/19 1545)    Heart Rate: 58 (04/18/19 1545)     R: 14  O2:  SpO2: 96 %    O2 Device: Nasal cannula (04/18/19 1545)    Oxygen Delivery: 2 LPM (04/18/19 1545)         Family/support present significant other   Patient  belongings     Patient transported on cart   DC meds/scripts (obs/outpt) Not applicable   Inpatient Pain Meds Released? Yes       Special needs/considerations None   Tasks needing completion None       Elzbieta Mcgee RN  [facility] [id number]

## 2019-04-18 NOTE — ANESTHESIA CARE TRANSFER NOTE
Patient: Harrison Thomas    Procedure(s):  Holmium Laser Enucleation Of The Prostate    Diagnosis: Urinary Retention  Diagnosis Additional Information: No value filed.    Anesthesia Type:   No value filed.     Note:  Airway :Face Mask  Patient transferred to:PACU  Comments: Spinal and MAC in OR 4, transferred to PACU with8 oxygen, hemodynamically stable. Upon arrival to PACU, pain is nil, no nausea. SpO2 98% on 8L O2 via face mask.     Carlos RitterHandoff Report: Identifed the Patient, Identified the Reponsible Provider, Reviewed the pertinent medical history, Discussed the surgical course, Reviewed Intra-OP anesthesia mangement and issues during anesthesia, Set expectations for post-procedure period and Allowed opportunity for questions and acknowledgement of understanding      Vitals: (Last set prior to Anesthesia Care Transfer)    CRNA VITALS  4/18/2019 1401 - 4/18/2019 1439      4/18/2019             Pulse:  64    SpO2:  98 %                Electronically Signed By: Carlos Ritter MD  April 18, 2019  2:39 PM

## 2019-04-18 NOTE — ANESTHESIA PROCEDURE NOTES
Spinal/LP Procedure Note    Spinal Block  Staff:     Anesthesiologist:  Christie Black MD    Resident/CRNA:  Carlos Ritter MD    Spinal/LP performed by resident/CRNA in presence of a teaching physician.    Location: OR  Procedure Start/Stop Times:      4/18/2019 12:30 PM     4/18/2019 12:35 PM    patient identified, IV checked, site marked, risks and benefits discussed, informed consent, monitors and equipment checked, pre-op evaluation, at physician/surgeon's request and post-op pain management      Correct Patient: Yes      Correct Position: Yes      Correct Site: Yes      Correct Procedure: Yes      Correct Laterality:  Yes    Site Marked:  Yes  Procedure:     Procedure:  Intrathecal    ASA:  3    Position:  Sitting    Sterile Prep: sterile gloves      Insertion site:  L3-4    Approach:  Midline    Needle Type:  John    Needle gauge (G):  20    Local Skin Infiltration:  1% lidocaine    amount (ml):  2    Needle Length (in):  3.5    Introducer used: Yes      Introducer gauge:  20 G    Attempts:  1    Redirects:  1    CSF:  Clear    Paresthesias:  No    Time injected:  12:33  Assessment/Narrative:     Sensory Level:  T9

## 2019-04-18 NOTE — ANESTHESIA POSTPROCEDURE EVALUATION
Anesthesia POST Procedure Evaluation    Patient: Harrison Thomas   MRN:     [id number] Gender:   male   Age:    73 year old :      1945        Preoperative Diagnosis: Urinary Retention   Procedure(s):  Holmium Laser Enucleation Of The Prostate   Postop Comments: No value filed.       Anesthesia Type:  General  No value filed.    Reportable Event: NO     PAIN: Uncomplicated   Sign Out status: Comfortable, Well controlled pain     PONV: No PONV   Sign Out status:  No Nausea or Vomiting     Neuro/Psych: Uneventful perioperative course   Sign Out Status: Preoperative baseline; Age appropriate mentation     Airway/Resp.: Uneventful perioperative course   Sign Out Status: Non labored breathing, age appropriate RR; Resp. Status within EXPECTED Parameters     CV: Uneventful perioperative course   Sign Out status: Appropriate BP and perfusion indices; Appropriate HR/Rhythm     Disposition:   Sign Out in:  PACU  Disposition:  Floor  Recovery Course: Uneventful  Follow-Up: Not required           Last Anesthesia Record Vitals:  CRNA VITALS  2019 1401 - 2019 1501      2019             Pulse:  64    SpO2:  98 %          Last PACU Vitals:  Vitals Value Taken Time   /72 2019  4:30 PM   Temp 36.2  C (97.2  F) 2019  3:45 PM   Pulse 65 2019  4:30 PM   Resp 19 2019  4:38 PM   SpO2 95 % 2019  4:39 PM   Temp src     NIBP     Pulse     SpO2     Resp     Temp     Ht Rate     Temp 2     Vitals shown include unvalidated device data.      Electronically Signed By: Christie Olmedo MD, 2019, 4:52 PM

## 2019-04-18 NOTE — BRIEF OP NOTE
[facility], [place]    Brief Operative Note    Pre-operative diagnosis: Urinary Retention  Post-operative diagnosis * No post-op diagnosis entered *  Procedure: Procedure(s):  Holmium Laser Enucleation Of The Prostate  Surgeon: Surgeon(s) and Role:     * Jerry Horvath MD - Primary     * Víctor Thompson MD - Resident - Assisting  Anesthesia: General   Estimated blood loss: Less than 100 ml  Drains: 22Fr 3 way Mir catheter, 60 ml in the balloon, on continuous bladder irrigation  Specimens:   ID Type Source Tests Collected by Time Destination   A :  Organ Prostate SURGICAL PATHOLOGY EXAM Jerry Horvath MD 4/18/2019  2:29 PM      Findings:   approximately 50cc prostate, unremarkable HoLEP.  Complications: None.  Implants:  * No implants in log *

## 2019-04-18 NOTE — OP NOTE
Operative Report  4/18/2019    PREOPERATIVE DIAGNOSIS:  Prostatic hypertrophy with urinary retention  POSTOPERATIVE DIAGNOSIS: Same as above    PROCEDURE PERFORMED: Holmium laser enucleation of the prostate  ATTENDING SURGEON: Jeryr Horvath MD  RESIDENT SURGEON: Víctor Thompson MD    FINDINGS: Approximately 60 gm prostate with trilobar hypertrophy. Bladder with moderate trabeculation  ANESTHESIA: General  INTRAVENOUS FLUIDS: See anesthesia records  ESTIMATED BLOOD LOSS: Less than 100 ml   SPECIMENS: Prostate adenoma  DRAINS: 22-E[place]ian 3-way catheter with 60 ml in balloon     INDICATIONS FOR PROCEDURE: Harrison Thomas is a(n) 73 year old male who was seen in consultation for urinary retention. He has elected treatment with laser enucleation. Prostate volume estimated to be 60 grams. His digital rectal exam was unremarkable.  After discussion of the risks, benefits and alternatives of the procedure, the patient agreed to proceed with the above stated procdure.    DESCRIPTION OF PROCEDURE: After obtaining informed consent, the patient was taken to the operating room and placed under general anesthesia.  He was repositioned in dorsal lithotomy making sure that the legs were positioned and padded safely.  He was then prepped and draped in standard sterile fashion.  Culture directed antibiotics were administered and bilateral sequential compression devices were placed.  A time out was performed confirming the appropriate patient identity and planned procedure.     The procedure was begun by generously lubricating the urethra.  The urethra was noted to be patent and did not require use of the tez urethrotome in order to place the 26F outer sheath.  The outer sheath was placed over a deflecting obturator and we then looked the scope through the posterior urethra and into the bladder.  The prostate was noted to have a trilobar configuration.  At this point we inserted the 550 micron holmium laser through the 7F laser  catheter.    We began enucleation by making a 5 o'clock incision.  We carried this incision down to the prostatic capsule from the bladder neck towards the apex just proximal to the veromontanum.  We then proceeded with a three lobe  approach, electing to enucleate the middle lobe first.  We made a counter incision at 7 oclock which we matched up to the other incision proximal to the veromontanum.  We performed the majority of the dissection at 40 yanes making sure to keep the energy at 40 yanes or lower when working near the apex.  We pushed the lobe upwards towards the bladder neck and transected the posterior attachments at the bladder neck, freeing it up in the process.  We next went to the left apex and began enucleation of the left lobe.,The capsular planes on this side were noted to be good.  We identified the anterior commisure and split it using the laser.  Once the majority of the lobe was dissected we isolated the mucosal strip and transected it with the laser at 40 yanes.  We then proceeded to take down the remaining lateral and posterior attachments and pushed the lobe into the bladder.  We then enucleated the contralateral lobe using a top down approach.  Capsular planes were noted to be good on this side.  The remaining bladder neck attachments were identified and transected, freeing the lobe up entirely. Total enucleation time was 57 minutes.    At this point there was a moderate amount of bleeding and approximately 5 minutes were spent identifying bleeding vessels in the fossa and coagulating them with the laser.  Once hemostasis was adequate we switched from the laser resectoscope to the 26F offset telescope with the Piranha morcellation device.  We added an extra inflow to distend the bladder.  The blades were adjusted and set at a rate of 1500 RPM.  We proceeded with morcellation making sure that we morcellated the entirety of the adenoma.  Total morcellation time was 9 minutes.     We then  switched back to the laser resectoscope one final time to ensure that no residual tissue was left in the bladder.  We also confirmed that the bladder was unharmed from morcellation and that both ureteral orifices were unharmed during the procedure.  We inspected the fossa and obtained final hemostasis.   We looked the scope out noting that the sphincter was completely intact without evidence of thermal or mechanical injury.     We lubricated the urethra again and passed a 22F 3-way martinez catheter over a catheter guide.  The urine was noted to be clear.  We filled the balloon with 60 ml of sterile water and did not place the catheter on traction.  The patient was woken from anesthesia and taken to the recovery room in stable condition.     The specimen was weighed and found to be approximately 44 g.     POSTOPERATIVE PLAN:   -We will monitor the patient post operatively on continuous bladder irrigation for signs of ongling bleeding.  If clear we will consider discharge with martinez removal as an outpatient.  If continues to have ongoing bleeding concerning for clot formation without bladder irrigation will plan to admit for observation    Jerry Horvath

## 2019-04-19 ENCOUNTER — PATIENT OUTREACH (OUTPATIENT)
Dept: UROLOGY | Facility: CLINIC | Age: 74
End: 2019-04-19

## 2019-04-19 VITALS
TEMPERATURE: 98.1 F | BODY MASS INDEX: 24.81 KG/M2 | RESPIRATION RATE: 16 BRPM | WEIGHT: 173.28 LBS | HEART RATE: 70 BPM | HEIGHT: 70 IN | DIASTOLIC BLOOD PRESSURE: 60 MMHG | SYSTOLIC BLOOD PRESSURE: 135 MMHG | OXYGEN SATURATION: 93 %

## 2019-04-19 PROCEDURE — 25000132 ZZH RX MED GY IP 250 OP 250 PS 637: Performed by: UROLOGY

## 2019-04-19 PROCEDURE — 25000132 ZZH RX MED GY IP 250 OP 250 PS 637: Performed by: STUDENT IN AN ORGANIZED HEALTH CARE EDUCATION/TRAINING PROGRAM

## 2019-04-19 PROCEDURE — 25000128 H RX IP 250 OP 636: Performed by: STUDENT IN AN ORGANIZED HEALTH CARE EDUCATION/TRAINING PROGRAM

## 2019-04-19 PROCEDURE — A9270 NON-COVERED ITEM OR SERVICE: HCPCS | Performed by: STUDENT IN AN ORGANIZED HEALTH CARE EDUCATION/TRAINING PROGRAM

## 2019-04-19 PROCEDURE — A9270 NON-COVERED ITEM OR SERVICE: HCPCS | Performed by: UROLOGY

## 2019-04-19 RX ADMIN — AMPICILLIN 2 G: 2 INJECTION, POWDER, FOR SOLUTION INTRAVENOUS at 04:49

## 2019-04-19 RX ADMIN — ROFLUMILAST 500 MCG: 500 TABLET ORAL at 07:56

## 2019-04-19 RX ADMIN — ACETAMINOPHEN 650 MG: 325 TABLET, FILM COATED ORAL at 07:03

## 2019-04-19 RX ADMIN — UMECLIDINIUM 1 PUFF: 62.5 AEROSOL, POWDER ORAL at 07:59

## 2019-04-19 RX ADMIN — AMLODIPINE BESYLATE 5 MG: 5 TABLET ORAL at 07:56

## 2019-04-19 RX ADMIN — METOPROLOL SUCCINATE 25 MG: 25 TABLET, EXTENDED RELEASE ORAL at 07:56

## 2019-04-19 RX ADMIN — ASPIRIN 81 MG CHEWABLE TABLET 81 MG: 81 TABLET CHEWABLE at 07:56

## 2019-04-19 RX ADMIN — AMPICILLIN 2 G: 2 INJECTION, POWDER, FOR SOLUTION INTRAVENOUS at 10:06

## 2019-04-19 RX ADMIN — LISINOPRIL 40 MG: 20 TABLET ORAL at 07:56

## 2019-04-19 RX ADMIN — ACETAMINOPHEN 650 MG: 325 TABLET, FILM COATED ORAL at 11:57

## 2019-04-19 RX ADMIN — ALFUZOSIN HYDROCHLORIDE 10 MG: 10 TABLET, EXTENDED RELEASE ORAL at 07:56

## 2019-04-19 ASSESSMENT — ACTIVITIES OF DAILY LIVING (ADL)
RETIRED_EATING: 0-->INDEPENDENT
BATHING: 0-->INDEPENDENT
FALL_HISTORY_WITHIN_LAST_SIX_MONTHS: NO
RETIRED_COMMUNICATION: 0-->UNDERSTANDS/COMMUNICATES WITHOUT DIFFICULTY
AMBULATION: 0-->INDEPENDENT
DRESS: 0-->INDEPENDENT
TRANSFERRING: 0-->INDEPENDENT
TOILETING: 0-->INDEPENDENT
COGNITION: 0 - NO COGNITION ISSUES REPORTED
SWALLOWING: 0-->SWALLOWS FOODS/LIQUIDS WITHOUT DIFFICULTY

## 2019-04-19 NOTE — PROGRESS NOTES
"Urology Daily Progress Note    24 hour events/Subjective:     - No acute events overnight   - Pain well controlled on current regimen, did not use any pain meds   - Tolerating regular diet ; no nausea or vomiting   - Passing flatus, no bowel movement   - Ambulated yesterday   - Urine clear mild pink on moderate CBI    O:  Vitals: /57   Pulse 75   Temp 96.3  F (35.7  C) (Oral)   Resp 16   Ht 1.778 m (5' 10\")   Wt 78.6 kg (173 lb 4.5 oz)   SpO2 98%   BMI 24.86 kg/m     - Temp (24hrs), Av.2  F (36.2  C), Min:96.3  F (35.7  C), Max:98.2  F (36.8  C)      General: Alert, interactive, in NAD  Resp: Non-labored breathing on RA  Abdomen: Soft, nontender, nondistended  Ext: Warm and well perfused   Mir/Urostomy: Light pink/watermelon on CBI moderate drip     Labs    Heme:  Recent Labs   Lab 198 19  0945   WBC 9.1  --    HGB 12.9* 14.5     --      Chem:  Recent Labs   Lab 19   POTASSIUM 4.1   CR 0.84     Assessment/Plan  73 year old y/o male POD#1 s/p HoLEP for approximately 60g prostate    NEURO Pain well controlled on  APAP with PRN Oxycodone and IV Dilaudid.  Changes:  None    CV HDS. Home antihypertensives   PULM Aggressive pulmonary toilet and I/S.   FEN/GI IVF: TKO IVF today  Diet: Regular    Irrigated for 25-50cc of clot this AM, catheter removed with bypass of catheter.  Obtain PVRs x2 prior to discharge   HEME Hgb as above.    ID Afebrile, no leukocytosis.    Antibiotics: Completed periop antibiotics    ENDO No issues   ACTIVITY Ambulate at least TID    PPx SCDs, ambulation   DISPO Discharge today after voids and PVR x2         To be discussed with Dr. Horvath    --  Víctor Thompson G3  Urology Resident  "

## 2019-04-19 NOTE — PLAN OF CARE
Pt. discharged at 1315 to home, was accompanied by wife, and left with personal belongings. Pt. received complete discharge paperwork and medications as filled by discharge pharmacy. Pt. was given times of last dose for all discharge medications in writing on discharge medication sheets. Discharge teaching included medication administration, pain management, activity restrictions, and signs and symptoms of infection. Pt. to follow up with Dr. jackman. Pt. had no further questions at the time of discharge and no unmet needs were identified.

## 2019-04-19 NOTE — PROGRESS NOTES
Pt arrived on unit at about 1700 and was transferred to bed in room 1036-1. Pt was oriented to room and unit.  Questions were answered.

## 2019-04-19 NOTE — PROGRESS NOTES
VSS. A/Ox's 4. Pain rated as negligible. Denied need for pain control. CMS intact. Tolerated regular diet. Denied any nausea, CP, SOB, lightheadedness or dizziness. CBI running at medium speed and urine is clear and pink. Passing flatus. Pt was up twice this shift, and ambulated in the hallway x1.  Resting in bed at this time with call light in reach and able to make needs known.

## 2019-04-19 NOTE — PLAN OF CARE
"      VS:   /57   Pulse 75   Temp 96.3  F (35.7  C) (Oral)   Resp 16   Ht 1.778 m (5' 10\")   Wt 78.6 kg (173 lb 4.5 oz)   SpO2 98%   BMI 24.86 kg/m       Output:   Mir Catheter CBI - light pink in color.  LBM 4/17/19     Lungs Clear - Hx of COPD patient on 2LPM at home - continue with 2LPM via NC   Activity:   SBA to Independent    Skin: Intact    Pain:   Denies    Neuro/CMS:   A&O x 4, CMS intact and denies any NT   Dressing(s):   Around penis - some serosanguinous output on dressing      Diet:   Regular    LDA:   Mir CBI , PIV infusing 125ml /hr    Equipment:   IV pole    Plan:   Discharge 4/19/19   Additional Info:          "

## 2019-04-22 ENCOUNTER — OFFICE VISIT (OUTPATIENT)
Dept: FAMILY MEDICINE | Facility: CLINIC | Age: 74
End: 2019-04-22
Payer: COMMERCIAL

## 2019-04-22 ENCOUNTER — ANCILLARY PROCEDURE (OUTPATIENT)
Dept: GENERAL RADIOLOGY | Facility: CLINIC | Age: 74
End: 2019-04-22
Attending: INTERNAL MEDICINE
Payer: COMMERCIAL

## 2019-04-22 VITALS
DIASTOLIC BLOOD PRESSURE: 76 MMHG | BODY MASS INDEX: 25.62 KG/M2 | HEART RATE: 72 BPM | WEIGHT: 179 LBS | HEIGHT: 70 IN | OXYGEN SATURATION: 95 % | SYSTOLIC BLOOD PRESSURE: 138 MMHG | TEMPERATURE: 98.1 F

## 2019-04-22 DIAGNOSIS — R39.14 BENIGN PROSTATIC HYPERPLASIA WITH INCOMPLETE BLADDER EMPTYING: ICD-10-CM

## 2019-04-22 DIAGNOSIS — M05.79 RHEUMATOID ARTHRITIS INVOLVING MULTIPLE SITES WITH POSITIVE RHEUMATOID FACTOR (H): Primary | ICD-10-CM

## 2019-04-22 DIAGNOSIS — Z23 NEED FOR VACCINATION: ICD-10-CM

## 2019-04-22 DIAGNOSIS — E78.5 HYPERLIPIDEMIA LDL GOAL <100: ICD-10-CM

## 2019-04-22 DIAGNOSIS — N40.1 BENIGN PROSTATIC HYPERPLASIA WITH INCOMPLETE BLADDER EMPTYING: ICD-10-CM

## 2019-04-22 DIAGNOSIS — J44.1 COPD EXACERBATION (H): ICD-10-CM

## 2019-04-22 DIAGNOSIS — M05.79 RHEUMATOID ARTHRITIS INVOLVING MULTIPLE SITES WITH POSITIVE RHEUMATOID FACTOR (H): ICD-10-CM

## 2019-04-22 PROCEDURE — 73630 X-RAY EXAM OF FOOT: CPT | Mod: LT

## 2019-04-22 PROCEDURE — 86580 TB INTRADERMAL TEST: CPT | Performed by: INTERNAL MEDICINE

## 2019-04-22 PROCEDURE — 99495 TRANSJ CARE MGMT MOD F2F 14D: CPT | Mod: 25 | Performed by: INTERNAL MEDICINE

## 2019-04-22 RX ORDER — SULFASALAZINE 500 MG/1
500 TABLET ORAL 3 TIMES DAILY
Qty: 90 TABLET | Refills: 3 | Status: SHIPPED | OUTPATIENT
Start: 2019-04-22 | End: 2019-06-12

## 2019-04-22 RX ORDER — AZITHROMYCIN 250 MG/1
TABLET, FILM COATED ORAL
Qty: 31 TABLET | Refills: 3 | Status: SHIPPED | OUTPATIENT
Start: 2019-04-22 | End: 2019-12-02

## 2019-04-22 RX ORDER — PREDNISONE 5 MG/1
TABLET ORAL
Qty: 100 TABLET | Refills: 3 | Status: SHIPPED | OUTPATIENT
Start: 2019-04-22 | End: 2019-06-13

## 2019-04-22 ASSESSMENT — MIFFLIN-ST. JEOR: SCORE: 1573.19

## 2019-04-22 NOTE — NURSING NOTE
The patient is asked the following questions today and these are his answers:    -Have you had a mantoux administered in the past 30 days?    No  -Have you had a previous positive Mantoux.  No  -Have you received BCG in the past.  No  -Have you had a live vaccine  (MMR, Varicella, OPV, Yellow Fever) in the last 6 weeks.  No  -Have you had and active  viral or bacterial infection in the past 6 weeks.  Yes  -Have you received corticosteroids or immunosuppressive agents in the past 6 weeks.  Yes  -Have you been diagnosed with HIV?  No  -Do you have a maglinancy?  No    Reviewed answers with CM Hurtado to give PPD.    Izabella Gipson ,CMA
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
## 2019-04-22 NOTE — PATIENT INSTRUCTIONS
Signs and symptoms of rheumatoid arthritis may include:   Tender, warm, swollen joints   Morning stiffness that may last for hours   Firm bumps of tissue under the skin on your arms (rheumatoid nodules)   Fatigue, fever and weight loss   Early rheumatoid arthritis tends to affect your smaller joints first -- particularly the joints that attach your fingers to your hands and your toes to your feet. As the disease progresses, symptoms often spread to the knees, ankles, elbows, hips and shoulders. In most cases, symptoms occur in the same joints on both sides of your body.   Many drugs used to treat rheumatoid arthritis have potentially serious side effects. Doctors typically prescribe medications with the fewest side effects first. You may need stronger drugs or a combination of drugs as your disease progresses.   NSAIDs. Nonsteroidal anti-inflammatory drugs (NSAIDs) can relieve pain and reduce inflammation. Over-the-counter NSAIDs include ibuprofen (Advil, Motrin IB) and naproxen sodium (Aleve). Stronger NSAIDs are available by prescription. Side effects may include ringing in your ears, stomach irritation, heart problems, and liver and kidney damage.   Steroids. Corticosteroid medications, such as prednisone, reduce inflammation and pain and slow joint damage. Side effects may include thinning of bones, cataracts, weight gain and diabetes. Doctors often prescribe a corticosteroid to relieve acute symptoms, with the goal of gradually tapering off the medication.   Disease-modifying antirheumatic drugs (DMARDs). These drugs can slow the progression of rheumatoid arthritis and save the joints and other tissues from permanent damage. Common DMARDs include methotrexate (Trexall), leflunomide (Arava), hydroxychloroquine (Plaquenil) and sulfasalazine (Azulfidine). Side effects vary but may include liver damage, bone marrow suppression and severe lung infections.   Immunosuppressants. These medications act to tame your  immune system, which is out of control in rheumatoid arthritis. Examples include azathioprine (Imuran, Azasan) and cyclosporine (Neoral, Sandimmune, Gengraf). These medications can increase your susceptibility to infection.   TNF-alpha inhibitors. Tumor necrosis factor-alpha (TNF-alpha) is an inflammatory substance produced by your body. TNF-alpha inhibitors can help reduce pain, morning stiffness, and tender or swollen joints. Examples include etanercept (Enbrel), infliximab (Remicade), adalimumab (Humira), golimumab (Simponi) and certolizumab (Cimzia). Potential side effects include nausea, diarrhea, hair loss and an increased risk of serious infections.   Other drugs. Several other rheumatoid arthritis drugs target a variety of processes involved with inflammation in your body. These drugs include anakinra (Kineret), abatacept (Orencia), rituximab (Rituxan), tocilizumab (Actemra) and tofacitinib (Xeljanz). Side effects vary but may include itching, abdominal pain, headache, runny nose or sore throat.     Azithromycin 250 mg daily  Prednisone 20 mg daily, then 10 mg daily, then 5 mg daily

## 2019-04-23 LAB — COPATH REPORT: NORMAL

## 2019-04-24 ENCOUNTER — ALLIED HEALTH/NURSE VISIT (OUTPATIENT)
Dept: NURSING | Facility: CLINIC | Age: 74
End: 2019-04-24
Payer: COMMERCIAL

## 2019-04-24 DIAGNOSIS — Z11.1 SCREENING EXAMINATION FOR PULMONARY TUBERCULOSIS: Primary | ICD-10-CM

## 2019-04-24 LAB
PPDINDURATION: 0 MM (ref 0–5)
PPDREDNESS: 0 MM

## 2019-04-24 PROCEDURE — 99207 ZZC NO CHARGE NURSE ONLY: CPT

## 2019-05-03 ENCOUNTER — OFFICE VISIT (OUTPATIENT)
Dept: PULMONOLOGY | Facility: CLINIC | Age: 74
End: 2019-05-03
Attending: INTERNAL MEDICINE
Payer: COMMERCIAL

## 2019-05-03 VITALS
BODY MASS INDEX: 26.34 KG/M2 | WEIGHT: 184 LBS | HEIGHT: 70 IN | HEART RATE: 61 BPM | OXYGEN SATURATION: 96 % | RESPIRATION RATE: 18 BRPM | SYSTOLIC BLOOD PRESSURE: 149 MMHG | DIASTOLIC BLOOD PRESSURE: 82 MMHG

## 2019-05-03 DIAGNOSIS — G47.33 OSA (OBSTRUCTIVE SLEEP APNEA): ICD-10-CM

## 2019-05-03 DIAGNOSIS — J44.1 OBSTRUCTIVE CHRONIC BRONCHITIS WITH EXACERBATION (H): ICD-10-CM

## 2019-05-03 DIAGNOSIS — G47.34 NOCTURNAL HYPOXEMIA: ICD-10-CM

## 2019-05-03 DIAGNOSIS — J42 CHRONIC BRONCHITIS, UNSPECIFIED CHRONIC BRONCHITIS TYPE (H): ICD-10-CM

## 2019-05-03 DIAGNOSIS — J44.1 OBSTRUCTIVE CHRONIC BRONCHITIS WITH EXACERBATION (H): Primary | ICD-10-CM

## 2019-05-03 DIAGNOSIS — J43.8 OTHER EMPHYSEMA (H): ICD-10-CM

## 2019-05-03 LAB
6 MIN WALK (FT): 1460 FT
6 MIN WALK (M): 445 M
DLCOCOR-%PRED-PRE: 55 %
DLCOCOR-PRE: 13.8 ML/MIN/MMHG
DLCOUNC-%PRED-PRE: 52 %
DLCOUNC-PRE: 13.09 ML/MIN/MMHG
DLCOUNC-PRED: 24.98 ML/MIN/MMHG
ERV-%PRED-PRE: 24 %
ERV-PRE: 0.24 L
ERV-PRED: 0.99 L
EXPTIME-PRE: 10.71 SEC
FEF2575-%PRED-PRE: 18 %
FEF2575-PRE: 0.44 L/SEC
FEF2575-PRED: 2.35 L/SEC
FEFMAX-%PRED-PRE: 50 %
FEFMAX-PRE: 4.05 L/SEC
FEFMAX-PRED: 8.03 L/SEC
FEV1-%PRED-PRE: 33 %
FEV1-PRE: 1.02 L
FEV1FEV6-PRE: 53 %
FEV1FEV6-PRED: 78 %
FEV1FVC-PRE: 52 %
FEV1FVC-PRED: 76 %
FEV1SVC-PRE: 53 %
FEV1SVC-PRED: 67 %
FIFMAX-PRE: 3.81 L/SEC
FVC-%PRED-PRE: 48 %
FVC-PRE: 1.95 L
FVC-PRED: 4.07 L
IC-%PRED-PRE: 47 %
IC-PRE: 1.69 L
IC-PRED: 3.58 L
VA-%PRED-PRE: 58 %
VA-PRE: 3.6 L
VC-%PRED-PRE: 42 %
VC-PRE: 1.93 L
VC-PRED: 4.56 L

## 2019-05-03 PROCEDURE — G0463 HOSPITAL OUTPT CLINIC VISIT: HCPCS | Mod: ZF

## 2019-05-03 RX ORDER — METHYLPREDNISOLONE 4 MG
TABLET, DOSE PACK ORAL
Qty: 21 TABLET | Refills: 3 | Status: SHIPPED | OUTPATIENT
Start: 2019-05-03 | End: 2019-08-15

## 2019-05-03 ASSESSMENT — ENCOUNTER SYMPTOMS
DECREASED CONCENTRATION: 0
ARTHRALGIAS: 1
DYSPNEA ON EXERTION: 1
INSOMNIA: 1
WHEEZING: 1
MUSCLE CRAMPS: 1
MUSCLE WEAKNESS: 0
SWOLLEN GLANDS: 0
STIFFNESS: 1
BRUISES/BLEEDS EASILY: 1
BACK PAIN: 1
NERVOUS/ANXIOUS: 0
SHORTNESS OF BREATH: 1
SPUTUM PRODUCTION: 1
DYSURIA: 1
FLANK PAIN: 1
HEMOPTYSIS: 0
HEMATURIA: 1
COUGH: 1
DEPRESSION: 1
COUGH DISTURBING SLEEP: 1
MYALGIAS: 1
SNORES LOUDLY: 1
JOINT SWELLING: 1
POSTURAL DYSPNEA: 0
NECK PAIN: 1
DIFFICULTY URINATING: 1
PANIC: 0

## 2019-05-03 ASSESSMENT — PAIN SCALES - GENERAL: PAINLEVEL: NO PAIN (0)

## 2019-05-03 ASSESSMENT — MIFFLIN-ST. JEOR: SCORE: 1595.87
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
## 2019-05-03 NOTE — PATIENT INSTRUCTIONS
Want you to be on either    Trelegy - taken once daily in AM. Rinse mouth after use  We gave you a coupon to try this free for one month.    OR    Symbicort (twice daily & rinse) PLUS either Incruz (once daily) OR SPIRIVA (once daily)    Which to take long term depends upon your out of pocket cost with your insurance    Try taking 'rescue' albuterol 10 minutes before exertional activity that is likely to make you short of breath.    Consider trying melatonin for improved sleep, but may be sleep apnea which would need CPAP. Could try Breathe Right.

## 2019-05-03 NOTE — NURSING NOTE
Chief Complaint   Patient presents with     COPD     Follow up on Harrison and his COPD     Arjun Neal CMA at 9:38 AM on 5/3/2019
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
## 2019-05-17 DIAGNOSIS — M47.816 LUMBAR ARTHROPATHY: ICD-10-CM

## 2019-05-17 NOTE — TELEPHONE ENCOUNTER
Last Written Prescription Date:  10/29/2018  Last Fill Quantity: 120,  # refills: 1   Last office visit: 4/22/2019 with prescribing provider:  Tasia   Future Office Visit:   Next 5 appointments (look out 90 days)    Jun 07, 2019 10:45 AM CDT  Return Visit with Abimael [facility]ima MD  [facility] [address] [place], [place] [address]  [facility] [id number] [phone] OPT 2   Jun 13, 2019 11:30 AM CDT  Office Visit with Yaneli Dozier MD  [facility] ([facility]) [address] [facility] [id number] [phone]           Requested Prescriptions   Pending Prescriptions Disp Refills     Carisoprodol 250 MG TABS [Pharmacy Med Name: CARISOPRODOL 250 MG TABLET] 120 tablet      Sig: TAKE 1 TABLET BY MOUTH THREE TIMES A DAY AS NEEDED       There is no refill protocol information for this order

## 2019-05-20 RX ORDER — CARISOPRODOL 250 MG/1
TABLET ORAL
Qty: 120 TABLET | Refills: 1 | Status: SHIPPED | OUTPATIENT
Start: 2019-05-20 | End: 2019-09-04

## 2019-05-31 ENCOUNTER — PRE VISIT (OUTPATIENT)
Dept: UROLOGY | Facility: CLINIC | Age: 74
End: 2019-05-31

## 2019-06-04 ENCOUNTER — OFFICE VISIT (OUTPATIENT)
Dept: UROLOGY | Facility: CLINIC | Age: 74
End: 2019-06-04
Payer: COMMERCIAL

## 2019-06-04 VITALS
HEIGHT: 70 IN | HEART RATE: 81 BPM | SYSTOLIC BLOOD PRESSURE: 134 MMHG | BODY MASS INDEX: 26.34 KG/M2 | WEIGHT: 184 LBS | DIASTOLIC BLOOD PRESSURE: 82 MMHG

## 2019-06-04 DIAGNOSIS — N40.1 BENIGN PROSTATIC HYPERPLASIA WITH INCOMPLETE BLADDER EMPTYING: ICD-10-CM

## 2019-06-04 DIAGNOSIS — R39.14 BENIGN PROSTATIC HYPERPLASIA WITH INCOMPLETE BLADDER EMPTYING: ICD-10-CM

## 2019-06-04 DIAGNOSIS — N40.1 BENIGN PROSTATIC HYPERPLASIA WITH INCOMPLETE BLADDER EMPTYING: Primary | ICD-10-CM

## 2019-06-04 DIAGNOSIS — R39.14 BENIGN PROSTATIC HYPERPLASIA WITH INCOMPLETE BLADDER EMPTYING: Primary | ICD-10-CM

## 2019-06-04 LAB — PSA SERPL-MCNC: 0.42 UG/L (ref 0–4)

## 2019-06-04 RX ORDER — SILDENAFIL 100 MG/1
100 TABLET, FILM COATED ORAL DAILY PRN
Qty: 20 TABLET | Refills: 6 | Status: SHIPPED | OUTPATIENT
Start: 2019-06-04 | End: 2019-10-11

## 2019-06-04 ASSESSMENT — MIFFLIN-ST. JEOR: SCORE: 1595.87

## 2019-06-04 ASSESSMENT — PAIN SCALES - GENERAL: PAINLEVEL: NO PAIN (0)

## 2019-06-04 NOTE — PATIENT INSTRUCTIONS
Please get your PSA drawn today, and follow up with Urology as needed!    It was a pleasure meeting with you today.  Thank you for allowing me and my team the privilege of caring for you today.  YOU are the reason we are here, and I truly hope we provided you with the excellent service you deserve.  Please let us know if there is anything else we can do for you so that we can be sure you are leaving completely satisfied with your care experience.        Stevie Quinn

## 2019-06-04 NOTE — PROGRESS NOTES
Urology Clinic    Jerry Horvath MD  Date of Service: 2019     Name: Harrison Thomas  MRN: [id number]  Age: 73 year old  : 1945  Referring provider: Yaneli Dozier     Assessment and Plan:  Assessment:  Harrison Thomas  is a 73 year old male with a history of BPH with urinary retention s/p HoLEP approximately 6 weeks ago. Urinary symptoms are improved and leakage is not problematic and is improving.     Plan:    Kegel exercises can be performed to help with leakage. If leakage is persisting, he can call for a referral for pelvic floor therapy.     Repeat PSA today to reestablish baseline.     Prescription for Viagra, 100 mg tablets that can be cut in half, was provided for erectile dysfunction. Discussed the side effects, anticipated benefits and risks of the medication. He was cautioned not to use the medication with nitroglycerin.     He can continue follow up with Dr. Dozier but is welcome to return to our clinic should any problems or concerns arise.   ______________________________________________________________________    HPI  Today I had the pleasure of seeing Mr. Thomas in follow-up for a history of BPH with urinary retention.     He underwent holmium laser enucleation of the prostate approximately 6 weeks ago.     44 gms of tissue were removed.  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     Today he notes improvement in urinary symptoms with strong stream. He feels like he is emptying fully. He reports occasional urgency but sometimes does not have an urge and leaks. He does also have leakage with coughing and sneezing. He uses one pad per day and reports that it is not soaked by the end of the day. Leakage is improving considerably. No hematuria. At the end of urination, he has some discomfort.     He is requesting a prescription for Viagra, which he has not used before. He is not taking nitroglycerin or any prostate medications any longer.     AUA Symptom Score is  "5/35 with a 3/6 for bother    Urinary Flow Rate  Residual Volume by Ultrasound: 34 mL     Review of Systems:   Pertinent items are noted in HPI or as below, remainder of complete ROS is negative.      Physical Exam:   Patient is a 73 year old  male   Vitals: Blood pressure 134/82, pulse 81, height 1.778 m (5' 10\"), weight 83.5 kg (184 lb).  Notable Findings on Exam: Well-nourished male in no apparent distress     Laboratory:   I personally reviewed all applicable laboratory data and went over findings with patient  Significant for:    CBC RESULTS:  Recent Labs   Lab Test 04/18/19 2238 04/18/19  0945 04/05/19  1047 04/01/19  1116 02/24/19 2119 08/27/18  0949   WBC 9.1  --  14.2* 12.2* 8.5   < > 6.8   HGB 12.9* 14.5  --  14.3 13.9  --  14.5     --   --  231 188  --  166    < > = values in this interval not displayed.        BMP RESULTS:  Recent Labs   Lab Test 04/18/19 2238 04/18/19  0945 04/01/19  1116 03/09/19  1945 02/24/19 2119 02/18/19  1055     --   --  139 144 140   POTASSIUM 4.1  --  4.0 4.3 4.3 4.7   CHLORIDE 107  --   --  107 109 107   CO2 28  --   --  26 27 28   ANIONGAP 6  --   --  6 8 5   * 97  --  94 89 104*   BUN 17  --   --  22 18 19   CR 0.84  --  0.80 1.41* 0.85 0.93   GFRESTIMATED 88  --  90 50* 87 83   GFRESTBLACK >90  --  >90 57* >90 >90   KARLIE 8.0*  --   --  8.8 9.0 9.1       UA RESULTS:   Recent Labs   Lab Test 04/05/19  1225 04/01/19  1124 03/26/19  1145   SG 1.015 1.010 1.008   URINEPH 7.5* 7.0 7.0   NITRITE Positive* Positive* Negative   RBCU O - 2 O - 2 5*   WBCU 5-10* 0 - 5 1       CALCIUM RESULTS  Lab Results   Component Value Date    KARLIE 8.0 04/18/2019    KARLIE 8.8 03/09/2019    KARLIE 9.0 02/24/2019       PSA RESULTS  PSA   Date Value Ref Range Status   03/26/2019 5.00 (H) 0 - 4 ug/L Final     Comment:     Assay Method:  Chemiluminescence using Siemens Vista analyzer   03/07/2019 11.00 (H) 0 - 4 ug/L Final     Comment:     Assay Method:  Chemiluminescence using Siemens " [place] analyzer   05/07/2018 4.65 (H) 0 - 4 ug/L Final     Comment:     Assay Method:  Chemiluminescence using Siemens Vista analyzer   11/14/2016 3.90 0 - 4 ug/L Final     Comment:     Assay Method:  Chemiluminescence using Siemens Vista analyzer   05/28/2015 3.71 0 - 4 ug/L Final   05/23/2014 2.95 0 - 4 ug/L Final   05/13/2013 2.12 0 - 4 ug/L Final   05/14/2012 2.16 0 - 4 ug/L Final   05/16/2011 2.56 0 - 4 ug/L Final   05/24/2010 2.26 0 - 4 ug/L Final       Scribe Disclosure:  I, Keysha Lawson, am serving as a scribe to document services personally performed by Jerry Horvath MD at this visit, based upon the provider's statements to me. All documentation has been reviewed by the aforementioned provider prior to being entered into the official medical record.

## 2019-06-04 NOTE — NURSING NOTE
"Chief Complaint   Patient presents with     Surgical Followup     6 week follow up HoLEP       Blood pressure 134/82, pulse 81, height 1.778 m (5' 10\"), weight 83.5 kg (184 lb). Body mass index is 26.4 kg/m .    Patient Active Problem List   Diagnosis     Essential hypertension, benign     Pain in joint, upper arm     Allergic rhinitis     Pain in joint, lower leg     Chronic airway obstruction (H)     Monoclonal paraproteinemia     Immunodeficiency (H)     Eczema     COPD (chronic obstructive pulmonary disease) (H)     Transient cerebral ischemia     Bunion     Occipital neuralgia     HYPERLIPIDEMIA LDL GOAL <100     Health Care Home     Low back pain     Advanced directives, counseling/discussion     Olecranon bursitis of right elbow     Bruit     Retina hole     signed & scanned on 09/23/2011  (1-4-2013 printed but not scanned in)      Chronic pain syndrome     Atherosclerosis of native coronary artery of native heart without angina pectoris     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, unspecified thyrotoxicosis type     Right-sided low back pain with right-sided sciatica     SOB (shortness of breath)     BRENNEN (obstructive sleep apnea)     BPH (benign prostatic hyperplasia)       Allergies   Allergen Reactions     Levaquin Difficulty breathing     Plavix [Clopidogrel Bisulfate] Itching     Atorvastatin Calcium Cramps     lipitor     Cats      Dogs      Hctz [Hydrochlorothiazide]      Rash on legs       Current Outpatient Medications   Medication Sig Dispense Refill     albuterol (PROAIR HFA) 108 (90 Base) MCG/ACT inhaler INHALE 2 PUFFS INTO THE LUNGS EVERY 6 HOURS AS NEEDED FOR SHORTNESS OF BREATH / DYSPNEA OR WHEEZING 8.5 Inhaler 3     alfuzosin ER (UROXATRAL) 10 MG 24 hr tablet TAKE 1 TAB BY MOUTH ONCE DAILY AFTER A MEAL 90 tablet 1     amLODIPine (NORVASC) 5 MG tablet Take 1 tablet (5 mg) by mouth 2 times daily 180 tablet 3     aspirin 81 MG tablet Take 1 tablet by mouth 2 times daily       azithromycin " (ZITHROMAX) 250 MG tablet Daily 31 tablet 3     Carisoprodol 250 MG TABS TAKE 1 TABLET BY MOUTH THREE TIMES A DAY AS NEEDED 120 tablet 1     dutasteride (AVODART) 0.5 MG capsule Take 1 capsule (0.5 mg) by mouth daily 31 capsule 3     FISH OIL 1000 MG OR CAPS take one tablet twice daily       Fluticasone-Umeclidin-Vilanterol (TRELEGY ELLIPTA) 100-62.5-25 MCG/INH oral inhaler Inhale 1 puff into the lungs daily 1 Inhaler 11     furosemide (LASIX) 20 MG tablet Take 0.5 tablets (10 mg) by mouth daily 15 tablet 3     HYDROcodone-acetaminophen (NORCO) 5-325 MG tablet Take 1 tablet by mouth every 4 hours as needed 180 tablet 0     levalbuterol (XOPENEX) 0.31 MG/3ML neb solution INHALE 1 VIAL (3ML) BY NEBULIZATION EVERY 4 HOURS AS NEEDED FOR WHEEZING OR SHORTNESS OF BREATH. 720 mL 1     lidocaine (LIDODERM) 5 % Patch Apply up to 3 patches to painful area at once for up to 12 h within a 24 h period.  Remove after 12 hours. 60 patch 0     lisinopril (PRINIVIL/ZESTRIL) 40 MG tablet TAKE 1 TABLET (40 MG) BY MOUTH DAILY 90 tablet 1     methimazole (TAPAZOLE) 5 MG tablet Take 5 mg by mouth Take one tablet daily on Monday and Thursday       methylPREDNISolone (MEDROL DOSEPAK) 4 MG tablet therapy pack Follow Package Directions 21 tablet 3     metoprolol succinate ER (TOPROL-XL) 25 MG 24 hr tablet TAKE 1 TABLET (25 MG) BY MOUTH DAILY 90 tablet 1     Multiple Vitamins-Minerals (MULTIVITAMIN OR) Take 1 tablet by mouth daily        nitroglycerin (NITROSTAT) 0.4 MG sublingual tablet Place 1 tablet (0.4 mg) under the tongue See Admin Instructions for chest pain 30 tablet 5     order for DME Equipment being ordered: Oxygen  Please provide O2 continuous via NC from PORTABLE O2 concentrator for overnight usage at 2 LPM 1 Device 0     order for DME Equipment being ordered: flutter valve  Incentive spirometer 1 each 0     predniSONE (DELTASONE) 5 MG tablet 20 mg  Daily for 3 days, then 10 mg daily for 3 days, then 5 mg daily until seen. 100  tablet 3     roflumilast (DALIRESP) 500 MCG TABS tablet Take 1 tablet (500 mcg) by mouth daily 31 tablet 3     rosuvastatin (CRESTOR) 10 MG tablet TAKE 1 TABLET (10 MG) BY MOUTH DAILY 90 tablet 3     sulfaSALAzine (AZULFIDINE) 500 MG tablet Take 1 tablet (500 mg) by mouth 3 times daily 90 tablet 3     umeclidinium (INCRUSE ELLIPTA) 62.5 MCG/INH inhaler Inhale 1 puff into the lungs daily 1 Inhaler 1       Social History     Tobacco Use     Smoking status: Former Smoker     Packs/day: 1.50     Years: 30.00     Pack years: 45.00     Types: Cigarettes     Start date: 1996     Last attempt to quit: 1999     Years since quittin.4     Smokeless tobacco: Never Used     Tobacco comment: not  a smoker   Substance Use Topics     Alcohol use: Yes     Alcohol/week: 0.0 oz     Comment: 3 drinks month     Drug use: No       Farzaneh Mills LPN  2019  9:52 AM

## 2019-06-04 NOTE — LETTER
2019       RE: Harrison Thomas  [address] [address]     Dear Colleague,    Thank you for referring your patient, Harrison Thomas, to the [facility] UROLOGY AND INST FOR PROSTATE AND UROLOGIC CANCERS at [facility]. Please see a copy of my visit note below.      Urology Clinic    Jerry Horvath MD  Date of Service: 2019     Name: Harrison Thomas  MRN: [id number]  Age: 73 year old  : 1945  Referring provider: Yaneli Dozier     Assessment and Plan:  Assessment:  Harrison Thomas  is a 73 year old male with a history of BPH with urinary retention s/p HoLEP approximately 6 weeks ago. Urinary symptoms are improved and leakage is not problematic and is improving.     Plan:    Kegel exercises can be performed to help with leakage. If leakage is persisting, he can call for a referral for pelvic floor therapy.     Repeat PSA today to reestablish baseline.     Prescription for Viagra, 100 mg tablets that can be cut in half, was provided for erectile dysfunction. Discussed the side effects, anticipated benefits and risks of the medication. He was cautioned not to use the medication with nitroglycerin.     He can continue follow up with Dr. Dozier but is welcome to return to our clinic should any problems or concerns arise.   ______________________________________________________________________    HPI  Today I had the pleasure of seeing Mr. Thomas in follow-up for a history of BPH with urinary retention.     He underwent holmium laser enucleation of the prostate approximately 6 weeks ago.     44 gms of tissue were removed.  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     Today he notes improvement in urinary symptoms with strong stream. He feels like he is emptying fully. He reports occasional urgency but sometimes does not have an urge and leaks. He does also have leakage with coughing and sneezing. He uses one pad per  "day and reports that it is not soaked by the end of the day. Leakage is improving considerably. No hematuria. At the end of urination, he has some discomfort.     He is requesting a prescription for Viagra, which he has not used before. He is not taking nitroglycerin or any prostate medications any longer.     AUA Symptom Score is 5/35 with a 3/6 for bother    Urinary Flow Rate  Residual Volume by Ultrasound: 34 mL     Review of Systems:   Pertinent items are noted in HPI or as below, remainder of complete ROS is negative.      Physical Exam:   Patient is a 73 year old  male   Vitals: Blood pressure 134/82, pulse 81, height 1.778 m (5' 10\"), weight 83.5 kg (184 lb).  Notable Findings on Exam: Well-nourished male in no apparent distress     Laboratory:   I personally reviewed all applicable laboratory data and went over findings with patient  Significant for:    CBC RESULTS:  Recent Labs   Lab Test 04/18/19 2238 04/18/19 0945 04/05/19  1047 04/01/19 1116 02/24/19 2119 08/27/18  0949   WBC 9.1  --  14.2* 12.2* 8.5   < > 6.8   HGB 12.9* 14.5  --  14.3 13.9  --  14.5     --   --  231 188  --  166    < > = values in this interval not displayed.        BMP RESULTS:  Recent Labs   Lab Test 04/18/19 2238 04/18/19  0945 04/01/19  1116 03/09/19  1945 02/24/19 2119 02/18/19  1055     --   --  139 144 140   POTASSIUM 4.1  --  4.0 4.3 4.3 4.7   CHLORIDE 107  --   --  107 109 107   CO2 28  --   --  26 27 28   ANIONGAP 6  --   --  6 8 5   * 97  --  94 89 104*   BUN 17  --   --  22 18 19   CR 0.84  --  0.80 1.41* 0.85 0.93   GFRESTIMATED 88  --  90 50* 87 83   GFRESTBLACK >90  --  >90 57* >90 >90   KARLIE 8.0*  --   --  8.8 9.0 9.1       UA RESULTS:   Recent Labs   Lab Test 04/05/19  1225 04/01/19  1124 03/26/19  1145   SG 1.015 1.010 1.008   URINEPH 7.5* 7.0 7.0   NITRITE Positive* Positive* Negative   RBCU O - 2 O - 2 5*   WBCU 5-10* 0 - 5 1       CALCIUM RESULTS  Lab Results   Component Value Date    KARLIE " 8.0 04/18/2019    KARLIE 8.8 03/09/2019    KARLIE 9.0 02/24/2019       PSA RESULTS  PSA   Date Value Ref Range Status   03/26/2019 5.00 (H) 0 - 4 ug/L Final     Comment:     Assay Method:  Chemiluminescence using Siemens Vista analyzer   03/07/2019 11.00 (H) 0 - 4 ug/L Final     Comment:     Assay Method:  Chemiluminescence using Siemens Vista analyzer   05/07/2018 4.65 (H) 0 - 4 ug/L Final     Comment:     Assay Method:  Chemiluminescence using Siemens Vista analyzer   11/14/2016 3.90 0 - 4 ug/L Final     Comment:     Assay Method:  Chemiluminescence using Siemens Vista analyzer   05/28/2015 3.71 0 - 4 ug/L Final   05/23/2014 2.95 0 - 4 ug/L Final   05/13/2013 2.12 0 - 4 ug/L Final   05/14/2012 2.16 0 - 4 ug/L Final   05/16/2011 2.56 0 - 4 ug/L Final   05/24/2010 2.26 0 - 4 ug/L Final       Scribe Disclosure:  I, Keysha Lawson, am serving as a scribe to document services personally performed by Jerry Horvath MD at this visit, based upon the provider's statements to me. All documentation has been reviewed by the aforementioned provider prior to being entered into the official medical record.     Again, thank you for allowing me to participate in the care of your patient.      Sincerely,    Jerry Horvath MD

## 2019-06-05 ENCOUNTER — TELEPHONE (OUTPATIENT)
Dept: UROLOGY | Facility: CLINIC | Age: 74
End: 2019-06-05

## 2019-06-05 NOTE — TELEPHONE ENCOUNTER
Prior Authorization Retail Medication Request    Medication/Dose: Sildenafil 100 tablet  ICD code (if different than what is on RX):  Erectile dysfunction:N52.9  Previously Tried and Failed:  none  Rationale:  To help obtain an erection with sexual activity     Insurance Name:  Medicare Part A Only  Insurance ID:  [id number]      Secondary Insurance name:[facility].  Secondary Insurance ID:[id number]      Pharmacy Information (if different than what is on RX)  Name:  [facility] pharmacy-sure script  Phone:  [phone]

## 2019-06-05 NOTE — TELEPHONE ENCOUNTER
Prior Authorization Approval    Authorization Effective Date: 5/5/2019  Authorization Expiration Date: 6/4/2020  Medication: Sildenafil 100 tablet-PA Pending  Approved Dose/Quantity:   Reference #: [id number]   Insurance Company: [employer] - Phone [phone] Fax [phone]  Expected CoPay:       CoPay Card Available:      Foundation Assistance Needed:    Which Pharmacy is filling the prescription (Not needed for infusion/clinic administered): [id number] - [facility] [address] [place] [address]  Pharmacy Notified: Yes-Pharmacy will notify patient when ready.  Patient Notified: No

## 2019-06-07 ENCOUNTER — OFFICE VISIT (OUTPATIENT)
Dept: CARDIOLOGY | Facility: CLINIC | Age: 74
End: 2019-06-07
Payer: COMMERCIAL

## 2019-06-07 VITALS
WEIGHT: 184.3 LBS | HEART RATE: 74 BPM | BODY MASS INDEX: 26.39 KG/M2 | SYSTOLIC BLOOD PRESSURE: 138 MMHG | HEIGHT: 70 IN | DIASTOLIC BLOOD PRESSURE: 71 MMHG | OXYGEN SATURATION: 95 %

## 2019-06-07 DIAGNOSIS — I10 ESSENTIAL HYPERTENSION, BENIGN: ICD-10-CM

## 2019-06-07 DIAGNOSIS — E78.5 HYPERLIPIDEMIA LDL GOAL <100: ICD-10-CM

## 2019-06-07 DIAGNOSIS — J44.9 CHRONIC OBSTRUCTIVE PULMONARY DISEASE, UNSPECIFIED COPD TYPE (H): ICD-10-CM

## 2019-06-07 DIAGNOSIS — I25.10 CORONARY ARTERY DISEASE INVOLVING NATIVE CORONARY ARTERY OF NATIVE HEART WITHOUT ANGINA PECTORIS: Primary | ICD-10-CM

## 2019-06-07 LAB
CHOLEST SERPL-MCNC: 185 MG/DL
HDLC SERPL-MCNC: 65 MG/DL
LDLC SERPL CALC-MCNC: 93 MG/DL
NONHDLC SERPL-MCNC: 120 MG/DL
TRIGL SERPL-MCNC: 134 MG/DL

## 2019-06-07 PROCEDURE — 80061 LIPID PANEL: CPT | Performed by: NURSE PRACTITIONER

## 2019-06-07 PROCEDURE — 36415 COLL VENOUS BLD VENIPUNCTURE: CPT | Performed by: NURSE PRACTITIONER

## 2019-06-07 PROCEDURE — 99214 OFFICE O/P EST MOD 30 MIN: CPT | Performed by: INTERNAL MEDICINE

## 2019-06-07 ASSESSMENT — MIFFLIN-ST. JEOR: SCORE: 1597.23
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
## 2019-06-10 DIAGNOSIS — E78.5 HYPERLIPIDEMIA LDL GOAL <100: ICD-10-CM

## 2019-06-10 DIAGNOSIS — G89.29 CHRONIC BILATERAL LOW BACK PAIN WITHOUT SCIATICA: ICD-10-CM

## 2019-06-10 DIAGNOSIS — M54.50 CHRONIC BILATERAL LOW BACK PAIN WITHOUT SCIATICA: ICD-10-CM

## 2019-06-11 RX ORDER — ROSUVASTATIN CALCIUM 10 MG/1
TABLET, COATED ORAL
Qty: 90 TABLET | Refills: 3 | Status: SHIPPED | OUTPATIENT
Start: 2019-06-11 | End: 2020-02-03

## 2019-06-11 RX ORDER — LIDOCAINE 50 MG/G
1 PATCH TOPICAL EVERY 24 HOURS
Qty: 30 PATCH | Refills: 0 | Status: SHIPPED | OUTPATIENT
Start: 2019-06-11 | End: 2019-12-02

## 2019-06-11 NOTE — TELEPHONE ENCOUNTER
"Last Written Prescription Date:  2/26/18  Last Fill Quantity: 90 tablet,  # refills: 3   Last office visit: 4/22/2019 with prescribing provider:  Tasia   Future Office Visit:   Next 5 appointments (look out 90 days)    Jun 13, 2019 11:30 AM CDT  Office Visit with Yaneli Dozier MD  [facility] [address] [facility] [id number] [phone]         Requested Prescriptions   Pending Prescriptions Disp Refills     rosuvastatin (CRESTOR) 10 MG tablet 90 tablet 3     Sig: TAKE 1 TABLET (10 MG) BY MOUTH DAILY       Statins Protocol Passed - 6/10/2019  6:25 PM        Passed - LDL on file in past 12 months     Recent Labs   Lab Test 06/07/19  0939   LDL 93             Passed - No abnormal creatine kinase in past 12 months     Recent Labs   Lab Test 02/27/17  1021                   Passed - Recent (12 mo) or future (30 days) visit within the authorizing provider's specialty     Patient had office visit in the last 12 months or has a visit in the next 30 days with authorizing provider or within the authorizing provider's specialty.  See \"Patient Info\" tab in inbasket, or \"Choose Columns\" in Meds & Orders section of the refill encounter.              Passed - Medication is active on med list        Passed - Patient is age 18 or older          "

## 2019-06-11 NOTE — TELEPHONE ENCOUNTER
lidocaine (LIDODERM) 5 % Patch 60 patch 0 5/7/2018           Last Written Prescription Date:  05/07/2018  Last Fill Quantity: 60,   # refills: 0  Last Office Visit: 04/22/2019  Future Office visit:    Next 5 appointments (look out 90 days)    Jun 13, 2019 11:30 AM CDT  Office Visit with Yaneli Dozier MD  [facility]) [address] [facility] [address]  [phone]           Routing refill request to provider for review/approval because:  Drug not on the [facility], [facility] or [facility] refill protocol or controlled substance

## 2019-06-11 NOTE — TELEPHONE ENCOUNTER
"Prescription approved per [facility] Refill Protocol.  Barb SCOTT RN    Requested Prescriptions   Pending Prescriptions Disp Refills     lidocaine (LIDODERM) 5 % patch [Pharmacy Med Name: LIDOCAINE 5% PATCH] 30 patch 1     Sig: PLACE 1 PATCH ONTO THE SKIN EVERY 24 HOURS       Topical Anesthetic Agents Passed - 6/11/2019  9:32 AM        Passed - Recent (12 mo) or future (30 days) visit within the authorizing provider's specialty     Patient had office visit in the last 12 months or has a visit in the next 30 days with authorizing provider or within the authorizing provider's specialty.  See \"Patient Info\" tab in inbasket, or \"Choose Columns\" in Meds & Orders section of the refill encounter.              Passed - Medication is active on med list        Passed - Patient is age 2 or older          "

## 2019-06-11 NOTE — TELEPHONE ENCOUNTER
Prescription approved per [facility] Refill Protocol.  Barb SCOTT RN

## 2019-06-12 DIAGNOSIS — M05.79 RHEUMATOID ARTHRITIS INVOLVING MULTIPLE SITES WITH POSITIVE RHEUMATOID FACTOR (H): ICD-10-CM

## 2019-06-12 RX ORDER — SULFASALAZINE 500 MG/1
500 TABLET ORAL 3 TIMES DAILY
Qty: 90 TABLET | Refills: 3 | Status: SHIPPED | OUTPATIENT
Start: 2019-06-12 | End: 2019-06-13 | Stop reason: SINTOL

## 2019-06-12 NOTE — TELEPHONE ENCOUNTER
sulfaSALAzine (AZULFIDINE) 500 MG tablet 90 tablet 3 4/22/2019           Last Written Prescription Date:  4/22/2019  Last Fill Quantity: 90,   # refills: 3  Last Office Visit: 4/22/2019  Future Office visit:    Next 5 appointments (look out 90 days)    Jun 13, 2019 11:30 AM CDT  Office Visit with [facility]eli Do[facility] MD  [facility] [address] [facility] [address]  [phone]           Routing refill request to provider for review/approval because:  Drug not on the [facility], [facility] or  Health refill protocol or controlled substance

## 2019-06-13 ENCOUNTER — OFFICE VISIT (OUTPATIENT)
Dept: FAMILY MEDICINE | Facility: CLINIC | Age: 74
End: 2019-06-13
Payer: COMMERCIAL

## 2019-06-13 VITALS
BODY MASS INDEX: 25.34 KG/M2 | HEART RATE: 69 BPM | HEIGHT: 70 IN | SYSTOLIC BLOOD PRESSURE: 122 MMHG | DIASTOLIC BLOOD PRESSURE: 68 MMHG | TEMPERATURE: 97.8 F | OXYGEN SATURATION: 94 % | WEIGHT: 177 LBS

## 2019-06-13 DIAGNOSIS — M05.79 RHEUMATOID ARTHRITIS INVOLVING MULTIPLE SITES WITH POSITIVE RHEUMATOID FACTOR (H): ICD-10-CM

## 2019-06-13 DIAGNOSIS — M48.062 SPINAL STENOSIS OF LUMBAR REGION WITH NEUROGENIC CLAUDICATION: ICD-10-CM

## 2019-06-13 DIAGNOSIS — I10 BENIGN ESSENTIAL HYPERTENSION: ICD-10-CM

## 2019-06-13 DIAGNOSIS — E05.80 OTHER THYROTOXICOSIS WITHOUT THYROTOXIC CRISIS OR STORM: ICD-10-CM

## 2019-06-13 DIAGNOSIS — J44.9 CHRONIC OBSTRUCTIVE PULMONARY DISEASE, UNSPECIFIED COPD TYPE (H): Primary | ICD-10-CM

## 2019-06-13 DIAGNOSIS — E78.5 HYPERLIPIDEMIA LDL GOAL <100: ICD-10-CM

## 2019-06-13 DIAGNOSIS — R13.10 DYSPHAGIA, UNSPECIFIED TYPE: ICD-10-CM

## 2019-06-13 LAB
ERYTHROCYTE [DISTWIDTH] IN BLOOD BY AUTOMATED COUNT: 14.2 % (ref 10–15)
HCT VFR BLD AUTO: 43.6 % (ref 40–53)
HGB BLD-MCNC: 14.6 G/DL (ref 13.3–17.7)
MCH RBC QN AUTO: 30 PG (ref 26.5–33)
MCHC RBC AUTO-ENTMCNC: 33.5 G/DL (ref 31.5–36.5)
MCV RBC AUTO: 90 FL (ref 78–100)
PLATELET # BLD AUTO: 215 10E9/L (ref 150–450)
RBC # BLD AUTO: 4.87 10E12/L (ref 4.4–5.9)
WBC # BLD AUTO: 10.5 10E9/L (ref 4–11)

## 2019-06-13 PROCEDURE — 80076 HEPATIC FUNCTION PANEL: CPT | Performed by: INTERNAL MEDICINE

## 2019-06-13 PROCEDURE — 99214 OFFICE O/P EST MOD 30 MIN: CPT | Performed by: INTERNAL MEDICINE

## 2019-06-13 PROCEDURE — 85027 COMPLETE CBC AUTOMATED: CPT | Performed by: INTERNAL MEDICINE

## 2019-06-13 PROCEDURE — 36415 COLL VENOUS BLD VENIPUNCTURE: CPT | Performed by: INTERNAL MEDICINE

## 2019-06-13 RX ORDER — HYDROCODONE BITARTRATE AND ACETAMINOPHEN 5; 325 MG/1; MG/1
1 TABLET ORAL EVERY 4 HOURS PRN
Qty: 180 TABLET | Refills: 0 | Status: SHIPPED | OUTPATIENT
Start: 2019-06-13 | End: 2019-07-17

## 2019-06-13 RX ORDER — PREDNISONE 5 MG/1
5 TABLET ORAL DAILY
Qty: 100 TABLET | Refills: 3 | COMMUNITY
Start: 2019-06-13 | End: 2019-08-27

## 2019-06-13 RX ORDER — FOLIC ACID 1 MG/1
1 TABLET ORAL DAILY
Qty: 100 TABLET | Refills: 3 | Status: SHIPPED | OUTPATIENT
Start: 2019-06-13 | End: 2020-04-28

## 2019-06-13 RX ORDER — OMEPRAZOLE 40 MG/1
40 CAPSULE, DELAYED RELEASE ORAL DAILY
Qty: 31 CAPSULE | Refills: 1 | Status: SHIPPED | OUTPATIENT
Start: 2019-06-13 | End: 2019-07-18

## 2019-06-13 ASSESSMENT — MIFFLIN-ST. JEOR: SCORE: 1564.12

## 2019-06-13 NOTE — PATIENT INSTRUCTIONS
Methotrexate 7.5 mg weekly   Take folic acid 1 mg daily   Patient Education     Dysphagia (Adult)    Dysphagia is trouble swallowing. If you have dysphagia, you may have symptoms that include:    Choking or coughing when you eat or drink    Food getting stuck    Drooling    Inability to swallow    Pain behind the breastbone after swallowing    Vomiting after you eat or drink    Aspirating (inhaling into the lungs) foods or liquids when you swallow  The main causes of dysphagia are problems that affect the mouth, throat or tongue. Dysphagia may be caused by a problem with the esophagus (tube that carries food from the mouth to the stomach). These include blockage, swelling, or irritation from acid reflux or injury. An infection of the esophagus or an allergic reaction in the esophagus can also cause dysphagia. Problems in the brain, such as a stroke or Parkinson's disease, can affect the muscles that coordinate swallowing.  Dysphagia is treated by treating the cause. Your healthcare provider may evaluate you using X-ray, special esophagus monitors, a fiber optic evaluation of swallowing, or an upper endoscopy, which uses a thin, lighted tube sent through the mouth to the esophagus. You may be given medicine to reduce stomach acid or control muscle spasms. If the problem doesn't go away, a procedure can be done to widen (dilate) the esophagus. If you have muscle or nerve problems, you may be advised to see a speech or occupational therapist. He or she may give you exercises and instructions to help make eating safer. If you have an infection or allergic condition, your healthcare provide will prescribe medicine for it.  Home care  To help ease the symptoms of dysphagia:    Take any medicine you ve been given exactly as directed. Ask for liquid medicines if you need them.    To make eating easier:  ? Eat slowly.   ? Eat in a relaxed setting.  ? Don t talk while you eat.  ? Take small bites. Chew slowly and completely  before you swallow.  ? Sit upright during and after meals. Chewing food releases enzymes in your mouth that start the digestive process. Chew soft foods at least 5 to10 times. Chew more dense food (meats and vegetables) up to 30 times before swallowing. Count the number of times you chew until you get a sense of how soft the food needs to be before swallowing.  ? Don't eat dry bread products or meat fibers.  ? Puree solid foods if needed. Thicken liquids with milk, juice, broth, gravy, or starch to make them easier to swallow.  ? Ask your healthcare provider if a liquid diet may be better for you.  Follow-up care  Follow up with your healthcare provider or as directed. Your healthcare provider can give you information about tests you may need.   When to seek medical advice  Call your healthcare provider right away for any of the following:    Inability to keep down food or liquid    Symptoms that get worse quickly    Coughing that won't stop    Continuing to lose weight    Fever of 100.4 F (38 C) or higher, or as directed by your healthcare provider    Other symptoms as indicated by your healthcare provider  Call 911  Call 911 for any of the following:    Trouble breathing    Inability to talk    Drooling, inability to control secretions    Loss of consciousness  Date Last Reviewed: 3/1/2018    [id number] [employer]. [address] [place] [address], [place] [address]. All rights reserved. This information is not intended as a substitute for professional medical care. Always follow your healthcare professional's instructions.
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
## 2019-06-14 LAB
ALBUMIN SERPL-MCNC: 3.3 G/DL (ref 3.4–5)
ALP SERPL-CCNC: 83 U/L (ref 40–150)
ALT SERPL W P-5'-P-CCNC: 23 U/L (ref 0–70)
AST SERPL W P-5'-P-CCNC: 19 U/L (ref 0–45)
BILIRUB DIRECT SERPL-MCNC: 0.2 MG/DL (ref 0–0.2)
BILIRUB SERPL-MCNC: 0.5 MG/DL (ref 0.2–1.3)
PROT SERPL-MCNC: 7.3 G/DL (ref 6.8–8.8)

## 2019-07-10 DIAGNOSIS — I10 BENIGN ESSENTIAL HYPERTENSION: ICD-10-CM

## 2019-07-10 RX ORDER — AMLODIPINE BESYLATE 5 MG/1
5 TABLET ORAL 2 TIMES DAILY
Qty: 180 TABLET | Refills: 3 | Status: SHIPPED | OUTPATIENT
Start: 2019-07-10 | End: 2019-08-15

## 2019-07-15 DIAGNOSIS — G89.4 CHRONIC PAIN SYNDROME: ICD-10-CM

## 2019-07-15 DIAGNOSIS — M48.062 SPINAL STENOSIS OF LUMBAR REGION WITH NEUROGENIC CLAUDICATION: ICD-10-CM

## 2019-07-15 LAB — TSH SERPL-ACNC: 0.22 UIU/ML (ref 0.3–5)

## 2019-07-15 NOTE — TELEPHONE ENCOUNTER
Reason for Call:  Medication or medication refill:    Do you use a [facility]?  Name of the pharmacy and phone number for the current request:       WRITTEN PRESCRIPTION REQUESTED      Name of the medication requested: Hydrocodone    Other request: Call once ready for     Can we leave a detailed message on this number? YES    Phone number patient can be reached at: Work number on file:  [phone] (work)    Best Time: anytime    Call taken on 7/15/2019 at 8:40 AM by Chester Velazquez

## 2019-07-17 ENCOUNTER — APPOINTMENT (OUTPATIENT)
Age: 74
Setting detail: DERMATOLOGY
End: 2019-08-05

## 2019-07-17 DIAGNOSIS — R13.10 DYSPHAGIA, UNSPECIFIED TYPE: ICD-10-CM

## 2019-07-17 DIAGNOSIS — L82.0 INFLAMED SEBORRHEIC KERATOSIS: ICD-10-CM

## 2019-07-17 PROCEDURE — OTHER LIQUID NITROGEN: OTHER

## 2019-07-17 PROCEDURE — 17110 DESTRUCT B9 LESION 1-14: CPT

## 2019-07-17 PROCEDURE — OTHER MIPS QUALITY: OTHER

## 2019-07-17 PROCEDURE — OTHER COUNSELING: OTHER

## 2019-07-17 RX ORDER — HYDROCODONE BITARTRATE AND ACETAMINOPHEN 5; 325 MG/1; MG/1
1 TABLET ORAL EVERY 4 HOURS PRN
Qty: 180 TABLET | Refills: 0 | Status: SHIPPED | OUTPATIENT
Start: 2019-07-17 | End: 2019-08-15

## 2019-07-17 ASSESSMENT — LOCATION DETAILED DESCRIPTION DERM: LOCATION DETAILED: LEFT SUPERIOR PARIETAL SCALP

## 2019-07-17 ASSESSMENT — LOCATION ZONE DERM: LOCATION ZONE: SCALP

## 2019-07-17 ASSESSMENT — LOCATION SIMPLE DESCRIPTION DERM: LOCATION SIMPLE: SCALP

## 2019-07-17 NOTE — TELEPHONE ENCOUNTER
Hydrocodone      Last Written Prescription Date:  6/13/19  Last Fill Quantity: 180,   # refills: 0  Last Office Visit: 6/13/19  Future Office visit:    Next 5 appointments (look out 90 days)    Aug 16, 2019  9:30 AM CDT  Office Visit with Yaneli Dozier MD  [facility] [address] [facility] [address]  [phone]           Patient is followed by Yaneli Dozier for ongoing prescription of pain medication.  All refills should only be approved by this provider, or covering partner.    Medication(s): Hydrocodone.   Maximum quantity per month: 180  Clinic visit frequency required: Q 3 months      Controlled substance agreement:  Encounter-Level CSA - 05/26/2017:    Controlled Substance Agreement - Scan on 6/6/2017  3:48 PM: CONTROLLED SUBSTANCE AGREEMENT (below)       Encounter-Level CSA - 11/14/2016:    Controlled Substance Agreement - Scan on 11/18/2016  7:45 AM: CONTROLLED SUBSTANCE AGREEMENT (below)       Patient-Level CSA:    There are no patient-level csa.       Pain Clinic evaluation in the past: No    DIRE Total Score(s):  No flowsheet data found.    Last [facility] website verification:  done on 7/17/19 no concerns   [email or web address]    Chino SHABAZZ RN

## 2019-07-17 NOTE — TELEPHONE ENCOUNTER
Rx is Ready for - called patient and he will  this Friday  [facility] Unit Coordinator

## 2019-07-17 NOTE — PROCEDURE: MIPS QUALITY
Detail Level: Detailed
Quality 474: Zoster Vaccination Status: Shingrix Vaccination Administered or Previously Received
Quality 110: Preventive Care And Screening: Influenza Immunization: Influenza Immunization previously received during influenza season
Quality 130: Documentation Of Current Medications In The Medical Record: Current Medications Documented
Quality 131: Pain Assessment And Follow-Up: Pain assessment using a standardized tool is documented as negative, no follow-up plan required
Quality 431: Preventive Care And Screening: Unhealthy Alcohol Use - Screening: Patient screened for unhealthy alcohol use using a single question and scores less than 2 times per year
Quality 226: Preventive Care And Screening: Tobacco Use: Screening And Cessation Intervention: Patient screened for tobacco and is an ex-smoker

## 2019-07-17 NOTE — PROCEDURE: LIQUID NITROGEN
Render Note In Bullet Format When Appropriate: No
Detail Level: Detailed
Post-Care Instructions: I reviewed with the patient in detail post-care instructions. Patient is to wear sunprotection, and avoid picking at any of the treated lesions. Pt may apply Vaseline to crusted or scabbing areas.
Render Post-Care Instructions In Note?: yes
Number Of Freeze-Thaw Cycles: 1 freeze-thaw cycle
Medical Necessity Information: It is in your best interest to select a reason for this procedure from the list below. All of these items fulfill various CMS LCD requirements except the new and changing color options.
Consent: The patient's consent was obtained including but not limited to risks of crusting, scabbing, blistering, scarring, darker or lighter pigmentary change, recurrence, incomplete removal and infection.
Medical Necessity Clause: This procedure was medically necessary because the lesions that were treated were:
Topical Anesthesia Type: liquid nitrogen
Duration Of Freeze Thaw-Cycle (Seconds): 10

## 2019-07-18 RX ORDER — OMEPRAZOLE 40 MG/1
40 CAPSULE, DELAYED RELEASE ORAL DAILY
Qty: 90 CAPSULE | Refills: 1 | Status: SHIPPED | OUTPATIENT
Start: 2019-07-18 | End: 2019-08-15

## 2019-07-20 DIAGNOSIS — J44.1 COPD EXACERBATION (H): ICD-10-CM

## 2019-07-20 NOTE — TELEPHONE ENCOUNTER
"Last Written Prescription Date:  2/12/19  Last Fill Quantity: 30.6 inhaler,  # refills: 1   Last office visit: 6/13/2019 with prescribing provider:  Tasia   Future Office Visit:   Next 5 appointments (look out 90 days)    Aug 16, 2019  9:30 AM CDT  Office Visit with Yaneli Dozier MD  [facility] [address] [facility] [id number] [phone]         Routing refill request to provider for review/approval because:  Drug not active on patient's medication list    Discontinued 5/9/2019, Reason for Discontinue: Alternate therapy    Requested Prescriptions   Pending Prescriptions Disp Refills     SYMBICORT 160-4.5 MCG/ACT Inhaler [Pharmacy Med Name: SYMBICORT 160-4.5 MCG INHALER] 30.6 Inhaler 1     Sig: INHALE 2 PUFFS INTO THE LUNGS 2 TIMES DAILY       Inhaled Steroids Protocol Failed - 7/20/2019  1:38 PM        Failed - Asthma control assessment score within normal limits in last 6 months     Please review ACT score.           Failed - Medication is active on med list        Passed - Patient is age 12 or older        Passed - Recent (6 mo) or future (30 days) visit within the authorizing provider's specialty     Patient had office visit in the last 6 months or has a visit in the next 30 days with authorizing provider or within the authorizing provider's specialty.  See \"Patient Info\" tab in inbasket, or \"Choose Columns\" in Meds & Orders section of the refill encounter.            No flowsheet data found.    "

## 2019-07-22 ENCOUNTER — OFFICE VISIT (OUTPATIENT)
Dept: SLEEP MEDICINE | Facility: CLINIC | Age: 74
End: 2019-07-22
Payer: COMMERCIAL

## 2019-07-22 VITALS
OXYGEN SATURATION: 95 % | BODY MASS INDEX: 26.48 KG/M2 | HEART RATE: 59 BPM | HEIGHT: 70 IN | DIASTOLIC BLOOD PRESSURE: 81 MMHG | WEIGHT: 185 LBS | SYSTOLIC BLOOD PRESSURE: 152 MMHG | RESPIRATION RATE: 16 BRPM

## 2019-07-22 DIAGNOSIS — J44.9 OSA AND COPD OVERLAP SYNDROME (H): Primary | ICD-10-CM

## 2019-07-22 DIAGNOSIS — G47.33 OSA AND COPD OVERLAP SYNDROME (H): Primary | ICD-10-CM

## 2019-07-22 PROCEDURE — 99214 OFFICE O/P EST MOD 30 MIN: CPT | Performed by: STUDENT IN AN ORGANIZED HEALTH CARE EDUCATION/TRAINING PROGRAM

## 2019-07-22 RX ORDER — BUDESONIDE AND FORMOTEROL FUMARATE DIHYDRATE 160; 4.5 UG/1; UG/1
AEROSOL RESPIRATORY (INHALATION)
Qty: 30.6 INHALER | Refills: 1 | Status: SHIPPED | OUTPATIENT
Start: 2019-07-22 | End: 2020-01-08

## 2019-07-22 ASSESSMENT — MIFFLIN-ST. JEOR: SCORE: 1600.4

## 2019-07-22 NOTE — TELEPHONE ENCOUNTER
Dr. Dozier,    Should this pt's pulmonary meds go through Dr. Handy(Pulm?) now?      His OV notes from May give several options:        Want you to be on either     Trelegy - taken once daily in AM. Rinse mouth after use  We gave you a coupon to try this free for one month.     OR     Symbicort (twice daily & rinse) PLUS either Incruz (once daily) OR SPIRIVA (once daily)     Which to take long term depends upon your out of pocket cost with your insurance

## 2019-07-22 NOTE — PATIENT INSTRUCTIONS
"1.  Risks of untreated sleep apnea discussed.  2.  Auto CPAP 5-15 cm H2O with heated humidity to limit nasal congestion. Adjust heat level on humidifier to find balance between dry nose and condensation in mask. Use distilled water.  3.  New supplies (mask, filter and tubing) every 6 months.  You may exchange the mask once within the first month if you feel the initial mask does not fit well.  4.  Recommend CPAP every-night, all night (minimum of 7 hours).  5.  Put CPAP on 10-15 minutes prior to bedtime until you get used to it.  6.  Remember to put CPAP on every night prior to bedtime.  7.  Wear as long as possible.  The longer the better.  8.  Let me know if you snore or feel the pressure is too high.   9.  Your sleep study was reviewed today.  10.  Discussed weight management and the impact of weight gain on sleep apnea.    11.  Do not drive if drowsy.  12.  If you become drowsy while driving, pull over and take a nap.    13.  Bring your CPAP machine with you to the follow up appointment.    Follow-up  3 month(s).  Bring your CPAP machine with you to the follow up appointment.    Frequently asked questions:  1. What is Obstructive Sleep Apnea (BRENNEN)? BRENNEN is the most common type of sleep apnea. Apnea means, \"without breath.\"  Apnea is most often caused by narrowing or collapse of the upper airway as muscles relax during sleep.   Almost everyone has occasional apneas. Most people with sleep apnea have had brief interruptions at night frequently for many years.  The severity of sleep apnea is related to how frequent and severe the events are.   2. What are the consequences of BRENNEN? Symptoms include: feeling sleepy during the day, snoring loudly, gasping or stopping of breathing, trouble sleeping, and occasionally morning headaches or heartburn at night.  Sleepiness can be serious and even increase the risk of falling asleep while driving. Other health consequences may include development of high blood pressure and " other cardiovascular disease in persons who are susceptible. Untreated BRENNEN  can contribute to heart disease, stroke and diabetes.   3. What are the treatment options? In most situations, sleep apnea is a lifelong disease that must be managed with daily therapy. Medications are not effective for sleep apnea and surgery is generally not considered until other therapies have been tried. Your treatment is your choice . Continuous Positive Airway (CPAP) works right away and is the therapy that is effective in nearly everyone. An oral device to hold your jaw forward is usually the next most reliable option. Other options include postioning devices (to keep you off your back), weight loss, and surgery including a tongue pacing device. There is more detail about some of these options below.    Important tips for using CPAP and similar devices   Know your equipment:  CPAP is continuous positive airway pressure that prevents obstructive sleep apnea by keeping the throat from collapsing while you are sleeping. In most cases, the device is  smart  and can slowly self-adjusts if your throat collapses and keeps a record every day of how well you are treated-this information is available to you and your care team.  BPAP is bilevel positive airway pressure that keeps your throat open and also assists each breath with a pressure boost to maintain adequate breathing.  Special kinds of BPAP are used in patients who have inadequate breathing from lung or heart disease. In most cases, the device is  smart  and can slowly self-adjusts to assist breathing. Like CPAP, the device keeps a record of how well you are treated.  Your mask is your connection to the device. You get to choose what feels most comfortable and the staff will help to make sure if fits. Here: are some examples of the different masks that are available:       Key points to remember on your journey with sleep apnea:  1. Sleep study.  PAP devices often need to be adjusted  during a sleep study to show that they are effective and adjusted right.  2. Good tips to remember: Try wearing just the mask during a quiet time during the day so your body adapts to wearing it. A humidifier is recommended for comfort in most cases to prevent drying of your nose and throat. Allergy medication from your provider may help you if you are having nasal congestion.  3. Getting settled-in. It takes more than one night for most of us to get used to wearing a mask. Try wearing just the mask during a quiet time during the day so your body adapts to wearing it. A humidifier is recommended for comfort in most cases. Our team will work with you carefully on the first day and will be in contact within 4 days and again at 2 and 4 weeks for advice and remote device adjustments. Your therapy is evaluated by the device each day.   4. Use it every night. The more you are able to sleep naturally for 7-8 hours, the more likely you will have good sleep and to prevent health risks or symptoms from sleep apnea. Even if you use it 4 hours it helps. Occasionally all of us are unable to use a medical therapy, in sleep apnea, it is not dangerous to miss one night.   5. Communicate. Call our skilled team on the number provided on the first day if your visit for problems that make it difficult to wear the device. Over 2 out of 3 patients can learn to wear the device long-term with help from our team. Remember to call our team or your sleep providers if you are unable to wear the device as we may have other solutions for those who cannot adapt to mask CPAP therapy. It is recommended that you sleep your sleep provider within the first 3 months and yearly after that if you are not having problems.   6. Use it for your health. We encourage use of CPAP masks during daytime quiet periods to allow your face and brain to adapt to the sensation of CPAP so that it will be a more natural sensation to awaken to at night or during naps.  This can be very useful during the first few weeks or months of adapting to CPAP though it does not help medically to wear CPAP during wakefulness and  should not be used as a strategy just to meet guidelines.  7. Take care of your equipment. Make sure you clean your mask and tubing using directions every day and that your filter and mask are replaced as recommended or if they are not working.     BESIDES CPAP, WHAT OTHER THERAPIES ARE THERE?    Positioning Device  Positioning devices are generally used when sleep apnea is mild and only occurs on your back.This example shows a pillow that straps around the waist. It may be appropriate for those whose sleep study shows milder sleep apnea that occurs primarily when lying flat on one's back. Preliminary studies have shown benefit but effectiveness at home may need to be verified by a home sleep test. These devices are generally not covered by medical insurance.  Examples of devices that maintain sleeping on the back to prevent snoring and mild sleep apnea.    Belt type body positioner  Http://[employer]ula [employer].Moneyspyder/    Electronic reminder  Http://nightshifttherapy.com/  Http://www.[employer].Moneyspyder.au/      Oral Appliance  What is oral appliance therapy?  An oral appliance device fits on your teeth at night like a retainer used after having braces. The device is made by a specialized dentist and requires several visits over 1-2 months before a manufactured device is made to fit your teeth and is adjusted to prevent your sleep apnea. Once an oral device is working properly, snoring should be improved. A home sleep test may be recommended at that time if to determine whether the sleep apnea is adequately treated.       Some things to remember:  -Oral devices are often, but not always, covered by your medical insurance. Be sure to check with your insurance provider.   -If you are referred for oral therapy, you will be given a list of specialized dentists to consider or you may choose to  visit the Web site of the American Academy of Dental Sleep Medicine  -Oral devices are less likely to work if you have severe sleep apnea or are extremely overweight.     More detailed information  An oral appliance is a small acrylic device that fits over the upper and lower teeth  (similar to a retainer or a mouth guard). This device slightly moves jaw forward, which moves the base of the tongue forward, opens the airway, improves breathing for effective treat snoring and obstructive sleep apnea in perhaps 7 out of 10 people .  The best working devices are custom-made by a dental device  after a mold is made of the teeth 1, 2, 3.  When is an oral appliance indicated?  Oral appliance therapy is recommended as a first-line treatment for patients with primary snoring, mild sleep apnea, and for patients with moderate sleep apnea who prefer appliance therapy to use of CPAP4, 5. Severity of sleep apnea is determined by sleep testing and is based on the number of respiratory events per hour of sleep.   How successful is oral appliance therapy?  The success rate of oral appliance therapy in patients with mild sleep apnea is 75-80% while in patients with moderate sleep apnea it is 50-70%. The chance of success in patients with severe sleep apnea is 40-50%. The research also shows that oral appliances have a beneficial effect on the cardiovascular health of BRENNEN patients at the same magnitude as CPAP therapy7.  Oral appliances should be a second-line treatment in cases of severe sleep apnea, but if not completely successful then a combination therapy utilizing CPAP plus oral appliance therapy may be effective. Oral appliances tend to be effective in a broad range of patients although studies show that the patients who have the highest success are females, younger patients, those with milder disease, and less severe obesity. 3, 6.   Finding a dentist that practices dental sleep medicine  Specific training is  available through the American Academy of Dental Sleep Medicine for dentists interested in working in the field of sleep. To find a dentist who is educated in the field of sleep and the use of oral appliances, near you, visit the Web site of the American Academy of Dental Sleep Medicine.    References  1. Charles et al. Objectively measured vs self-reported compliance during oral appliance therapy for sleep-disordered breathing. Chest 2013; 144(5): 4450-7834.  2. Damion, et al. Objective measurement of compliance during oral appliance therapy for sleep-disordered breathing. Thorax 2013; 68(1): 91-96.  3. Narendra et al. Mandibular advancement devices in 620 men and women with BRENNEN and snoring: tolerability and predictors of treatment success. Chest 2004; 125: 1589-2748.  4. Vick et al. Oral appliances for snoring and BRENNEN: a review. Sleep 2006; 29: 244-262.  5. Asaf et al. Oral appliance treatment for BRENNEN: an update. J Clin Sleep Med 2014; 10(2): 215-227.  6. Ag et al. Predictors of OSAH treatment outcome. J Dent Res 2007; 86: 0428-7764.      Weight Loss:    Weight loss is a long-term strategy that may improve sleep apnea in some patients.    Weight management is a personal decision and the decision should be based on your interest and the potential benefits.  If you are interested in exploring weight loss strategies, the following discussion covers the impact on weight loss on sleep apnea and the approaches that may be successful.    Being overweight does not necessarily mean you will have health consequences.  Those who have BMI over 35 or over 27 with existing medical conditions carries greater risk.   Weight loss decreases severity of sleep apnea in most people with obesity. For those with mild obesity who have developed snoring with weight gain, even 15-30 pound weight loss can improve and occasionally eliminate sleep apnea.  Structured and life-long dietary and health habits are  necessary to lose weight and keep healthier weight levels.     Though there may be significant health benefits from weight loss, long-term weight loss is very difficult to achieve- studies show success with dietary management in less than 10% of people. In addition, substantial weight loss may require years of dietary control and may be difficult if patients have severe obesity. In these cases, surgical management may be considered.  Finally, older individuals who have tolerated obesity without health complications may be less likely to benefit from weight loss strategies.        Your BMI is Body mass index is 26.54 kg/m .  Weight management is a personal decision.  If you are interested in exploring weight loss strategies, the following discussion covers the approaches that may be successful. Body mass index (BMI) is one way to tell whether you are at a healthy weight, overweight, or obese. It measures your weight in relation to your height.  A BMI of 18.5 to 24.9 is in the healthy range. A person with a BMI of 25 to 29.9 is considered overweight, and someone with a BMI of 30 or greater is considered obese. More than two-thirds of American adults are considered overweight or obese.  Being overweight or obese increases the risk for further weight gain. Excess weight may lead to heart disease and diabetes.  Creating and following plans for healthy eating and physical activity may help you improve your health.  Weight control is part of healthy lifestyle and includes exercise, emotional health, and healthy eating habits. Careful eating habits lifelong are the mainstay of weight control. Though there are significant health benefits from weight loss, long-term weight loss with diet alone may be very difficult to achieve- studies show long-term success with dietary management in less than 10% of people. Attaining a healthy weight may be especially difficult to achieve in those with severe obesity. In some cases,  medications, devices and surgical management might be considered.  What can you do?  If you are overweight or obese and are interested in methods for weight loss, you should discuss this with your provider.     Consider reducing daily calorie intake by 500 calories.     Keep a food journal.     Avoiding skipping meals, consider cutting portions instead.    Diet combined with exercise helps maintain muscle while optimizing fat loss. Strength training is particularly important for building and maintaining muscle mass. Exercise helps reduce stress, increase energy, and improves fitness. Increasing exercise without diet control, however, may not burn enough calories to loose weight.       Start walking three days a week 10-20 minutes at a time    Work towards walking thirty minutes five days a week     Eventually, increase the speed of your walking for 1-2 minutes at time    In addition, we recommend that you review healthy lifestyles and methods for weight loss available through the National Institutes of Health patient information sites:  http://win.niddk.nih.gov/publications/index.htm    And look into health and wellness programs that may be available through your health insurance provider, employer, local community center, or [employer].          Surgery:    Surgery for obstructive sleep apnea is considered generally only when other therapies fail to work. Surgery may be discussed with you if you are having a difficult time tolerating CPAP and or when there is an abnormal structure that requires surgical correction.  Nose and throat surgeries often enlarge the airway to prevent collapse.  Most of these surgeries create pain for 1-2 weeks and up to half of the most common surgeries are not effective throughout life.  You should carefully discuss the benefits and drawbacks to surgery with your sleep provider and surgeon to determine if it is the best solution for you.   More information  Surgery for BRENNEN is directed at  areas that are responsible for narrowing or complete obstruction of the airway during sleep.  There are a wide range of procedures available to enlarge and/or stabilize the airway to prevent blockage of breathing in the three major areas where it can occur: the palate, tongue, and nasal regions.  Successful surgical treatment depends on the accurate identification of the factors responsible for obstructive sleep apnea in each person.  A personalized approach is required because there is no single treatment that works well for everyone.  Because of anatomic variation, consultation with an examination by a sleep surgeon is a critical first step in determining what surgical options are best for each patient.  In some cases, examination during sedation may be recommended in order to guide the selection of procedures.  Patients will be counseled about risks and benefits as well as the typical recovery course after surgery. Surgery is typically not a cure for a person s BRENNEN.  However, surgery will often significantly improve one s BRENNEN severity (termed  success rate ).  Even in the absence of a cure, surgery will decrease the cardiovascular risk associated with OSA7; improve overall quality of life8 (sleepiness, functionality, sleep quality, etc).      Palate Procedures:  Patients with BRENNEN often have narrowing of their airway in the region of their tonsils and uvula.  The goals of palate procedures are to widen the airway in this region as well as to help the tissues resist collapse.  Modern palate procedure techniques focus on tissue conservation and soft tissue rearrangement, rather than tissue removal.  Often the uvula is preserved in this procedure. Residual sleep apnea is common in patient after pharyngoplasty with an average reduction in sleep apnea events of 33%2.      Tongue Procedures:  ExamWhile patients are awake, the muscles that surround the throat are active and keep this region open for breathing. These  muscles relax during sleep, allowing the tongue and other structures to collapse and block breathing.  There are several different tongue procedures available.  Selection of a tongue base procedure depends on characteristics seen on physical exam.  Generally, procedures are aimed at removing bulky tissues in this area or preventing the back of the tongue from falling back during sleep.  Success rates for tongue surgery range from 50-62%3.    Hypoglossal Nerve Stimulation:  Hypoglossal nerve stimulation has recently received approval from the [place] Food and Drug Administration for the treatment of obstructive sleep apnea.  This is based on research showing that the system was safe and effective in treating sleep apnea6.  Results showed that the median AHI score decreased 68%, from 29.3 to 9.0. This therapy uses an implant system that senses breathing patterns and delivers mild stimulation to airway muscles, which keeps the airway open during sleep.  The system consists of three fully implanted components: a small generator (similar in size to a pacemaker), a breathing sensor, and a stimulation lead.  Using a small handheld remote, a patient turns the therapy on before bed and off upon awakening.    Candidates for this device must be greater than 22 years of age, have moderate to severe BRENNEN (AHI between 20-65), BMI less than 32, have tried CPAP/oral appliance without success, and have appropriate upper airway anatomy (determined by a sleep endoscopy performed by Dr. Reyes).    Hypoglossal Nerve Stimulation Pathway:    The sleep surgeon s office will work with the patient through the insurance prior-authorization process (including communications and appeals).    Nasal Procedures:  Nasal obstruction can interfere with nasal breathing during the day and night.  Studies have shown that relief of nasal obstruction can improve the ability of some patients to tolerate positive airway pressure therapy for obstructive  sleep apnea1.  Treatment options include medications such as nasal saline, topical corticosteroid and antihistamine sprays, and oral medications such as antihistamines or decongestants. Non-surgical treatments can include external nasal dilators for selected patients. If these are not successful by themselves, surgery can improve the nasal airway either alone or in combination with these other options.      Combination Procedures:  Combination of surgical procedures and other treatments may be recommended, particularly if patients have more than one area of narrowing or persistent positional disease.  The success rate of combination surgery ranges from 66-80%2,3.    References  1. Aurea GARCIA. The Role of the Nose in Snoring and Obstructive Sleep Apnoea: An Update.  Eur Arch Otorhinolaryngol. 2011; 268: 1365-73.  2.  Morelia SM; Cony JA; Gael JR; Pallanch JF; Kassidy MB; Jackie SG; Pineda GALAN. Surgical modifications of the upper airway for obstructive sleep apnea in adults: a systematic review and meta-analysis. SLEEP 2010;33(10):3571-8955. El HORTON. Hypopharyngeal surgery in obstructive sleep apnea: an evidence-based medicine review.  Arch Otolaryngol Head Neck Surg. 2006 Feb;132(2):206-13.  3. Woody YH1, Gianna Y, Choco DEN. The efficacy of anatomically based multilevel surgery for obstructive sleep apnea. Otolaryngol Head Neck Surg. 2003 Oct;129(4):327-35.  4. El HORTON, Goldberg A. Hypopharyngeal Surgery in Obstructive Sleep Apnea: An Evidence-Based Medicine Review. Arch Otolaryngol Head Neck Surg. 2006 Feb;132(2):206-13.  5. Dexter MARTINEZ et al. Upper-Airway Stimulation for Obstructive Sleep Apnea.  N Engl J Med. 2014 Jan 9;370(2):139-49.  6. Kesha Y et al. Increased Incidence of Cardiovascular Disease in Middle-aged Men with Obstructive Sleep Apnea. Am J Respir Crit Care Med; 2002 166: 159-165  7. Ana M GAMINO et al. Studying Life Effects and Effectiveness of Palatopharyngoplasty (SLEEP) study: Subjective Outcomes  of Isolated Uvulopalatopharyngoplasty. Otolaryngol Head Neck Surg. 2011; 144: 623-631.

## 2019-07-22 NOTE — PROGRESS NOTES
Sleep Follow-Up Visit:    Date on this visit: 7/22/2019    Harrison Thomas is a 73 year old male with history of moderate BRENNEN (AHI 19.9), COPD, coronary artery disease, and HTN who comes in today for follow-up. PSG was performed on 2/12/2018 and showed hypoxemia along with BRENNEN, no hypoventilation was noted. At follow up visit on 2/26/18 treatment options were discussed and Auto CPAP was chosen, unfortunately Harrison then cancelled his CPAP set up with plan to set it up once he obtained health insurance. He has oxygen at night for COPD from his primary provider, uses at night 3x per week but ends up off of him. Does not like using the oxygen at night.     He is interested in starting CPAP now that he has met his insurance deductible.     Gets to sleep okay, but wakes up 5-6x each night. Will get up and walk around at times, may take up to 5-10 minutes. He is not sure what causes him to wake up. Some times will wake up at 3 am and then will just stay up, but not often.     Will use melatonin or Zzquil 1 to 2 times per week for sleep onset.     Had prostate surgery a few months ago, recovering from this okay.     Past medical/surgical history, family history, social history, medications and allergies were reviewed.      Problem List:  Patient Active Problem List    Diagnosis Date Noted     [facility] 06/03/2011     Priority: High     EMERGENCY CARE PLAN  Presenting Problem Signs and Symptoms Treatment Plan    Questions or conerns during clinic hours    I will call the clinic directly     Questions or conerns outside clinic hours    I will call the 24 hour nurse line at [phone]    Patient needs to schedule an appointment    I will call the 24 hour scheduling team at [phone] or clinic directly    Same day treatment     I will call the clinic first, nurse line if after hours, urgent care and express care if needed                          DX V65.8 REPLACED WITH [id number] [facility] (04/08/2013)        BPH (benign prostatic hyperplasia) 04/18/2019     Priority: Medium     BRENNEN (obstructive sleep apnea) 08/27/2018     Priority: Medium     SOB (shortness of breath) 01/03/2018     Priority: Medium     Coronary artery disease involving native coronary artery of native heart without angina pectoris 01/03/2018     Priority: Medium     Right-sided low back pain with right-sided sciatica 07/31/2017     Priority: Medium     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, unspecified thyrotoxicosis type 06/06/2016     Priority: Medium     Atherosclerosis of native coronary artery of native heart without angina pectoris 11/23/2015     Priority: Medium     Chronic pain syndrome 08/27/2015     Priority: Medium     Patient is followed by Yaneli Dozier for ongoing prescription of pain medication.  All refills should only be approved by this provider, or covering partner.    Medication(s): Hydrocodone.   Maximum quantity per month: 180  Clinic visit frequency required: Q 3 months      Controlled substance agreement:  Encounter-Level CSA - 05/26/2017:    Controlled Substance Agreement - Scan on 6/6/2017  3:48 PM: CONTROLLED SUBSTANCE AGREEMENT (below)       Encounter-Level CSA - 11/14/2016:    Controlled Substance Agreement - Scan on 11/18/2016  7:45 AM: CONTROLLED SUBSTANCE AGREEMENT (below)       Patient-Level CSA:    There are no patient-level csa.       Pain Clinic evaluation in the past: No    DIRE Total Score(s):  No flowsheet data found.    Last [place] website verification:  done on 7/17/19  GM   [email or web address]       Retina hole      Priority: Medium     surgery by Dr Mathieu Drake      Priority: Medium     Olecranon bursitis of right elbow 04/27/2012     Priority: Medium     Advanced directives, counseling/discussion 11/14/2011     Priority: Medium     Advance Directive Problem List Overview:   Name Relationship Phone    Primary Health Care Agent            Alternative Health Care Agent          Patient  states has Advance Directive and will bring in a copy to clinic. 11/14/2011          Low back pain 09/23/2011     Priority: Medium     On chronic narcotics and muscle relaxants.       signed & scanned on 09/23/2011  (1-4-2013 printed but not scanned in)  09/23/2011     Priority: Medium     Mn  checked 09/20/16       HYPERLIPIDEMIA LDL GOAL <100 10/31/2010     Priority: Medium     Occipital neuralgia 04/13/2010     Priority: Medium     Bunion 07/02/2009     Priority: Medium     Transient cerebral ischemia      Priority: Medium     Diagnosis updated by automated process. Provider to review and confirm.       COPD (chronic obstructive pulmonary disease) (H) 01/29/2009     Priority: Medium     Eczema 11/03/2008     Priority: Medium     Immunodeficiency (H)      Priority: Medium     IG SUBCLASS 2       Chronic airway obstruction (H) 11/26/2007     Priority: Medium     Problem list name updated by automated process. Provider to review       Monoclonal paraproteinemia      Priority: Medium     Pain in joint, lower leg 08/28/2007     Priority: Medium     Allergic rhinitis 07/08/2005     Priority: Medium     Problem list name updated by automated process. Provider to review       Essential hypertension, benign 11/11/2003     Priority: Medium     Pain in joint, upper arm 11/11/2003     Priority: Medium        Medications:    Current Outpatient Medications   Medication Sig Dispense Refill     albuterol (PROAIR HFA) 108 (90 Base) MCG/ACT inhaler INHALE 2 PUFFS INTO THE LUNGS EVERY 6 HOURS AS NEEDED FOR SHORTNESS OF BREATH / DYSPNEA OR WHEEZING 8.5 Inhaler 3     alfuzosin ER (UROXATRAL) 10 MG 24 hr tablet TAKE 1 TAB BY MOUTH ONCE DAILY AFTER A MEAL 90 tablet 1     amLODIPine (NORVASC) 5 MG tablet Take 1 tablet (5 mg) by mouth 2 times daily 180 tablet 3     aspirin 81 MG tablet Take 1 tablet by mouth 2 times daily       azithromycin (ZITHROMAX) 250 MG tablet Daily 31 tablet 3     Carisoprodol 250 MG TABS TAKE 1 TABLET BY MOUTH  THREE TIMES A DAY AS NEEDED 120 tablet 1     dutasteride (AVODART) 0.5 MG capsule Take 1 capsule (0.5 mg) by mouth daily 31 capsule 3     FISH OIL 1000 MG OR CAPS take one tablet twice daily       Fluticasone-Umeclidin-Vilanterol (TRELEGY ELLIPTA) 100-62.5-25 MCG/INH oral inhaler Inhale 1 puff into the lungs daily 1 Inhaler 11     folic acid (FOLVITE) 1 MG tablet Take 1 tablet (1 mg) by mouth daily 100 tablet 3     furosemide (LASIX) 20 MG tablet Take 0.5 tablets (10 mg) by mouth daily 15 tablet 3     HYDROcodone-acetaminophen (NORCO) 5-325 MG tablet Take 1 tablet by mouth every 4 hours as needed 180 tablet 0     levalbuterol (XOPENEX) 0.31 MG/3ML neb solution INHALE 1 VIAL (3ML) BY NEBULIZATION EVERY 4 HOURS AS NEEDED FOR WHEEZING OR SHORTNESS OF BREATH. 720 mL 1     lidocaine (LIDODERM) 5 % patch PLACE 1 PATCH ONTO THE SKIN EVERY 24 HOURS 30 patch 0     lisinopril (PRINIVIL/ZESTRIL) 40 MG tablet TAKE 1 TABLET (40 MG) BY MOUTH DAILY 90 tablet 1     methimazole (TAPAZOLE) 5 MG tablet Take 5 mg by mouth Take one tablet daily on Monday and Thursday       methotrexate 2.5 MG tablet Take 3 tablets (7.5 mg) by mouth every 7 days 15 tablet 3     methylPREDNISolone (MEDROL DOSEPAK) 4 MG tablet therapy pack Follow Package Directions 21 tablet 3     metoprolol succinate ER (TOPROL-XL) 25 MG 24 hr tablet TAKE 1 TABLET (25 MG) BY MOUTH DAILY 90 tablet 1     Multiple Vitamins-Minerals (MULTIVITAMIN OR) Take 1 tablet by mouth daily        nitroglycerin (NITROSTAT) 0.4 MG sublingual tablet Place 1 tablet (0.4 mg) under the tongue See Admin Instructions for chest pain 30 tablet 5     omeprazole (PRILOSEC) 40 MG DR capsule Take 1 capsule (40 mg) by mouth daily 90 capsule 1     order for DME Equipment being ordered: Oxygen  Please provide O2 continuous via NC from PORTABLE O2 concentrator for overnight usage at 2 LPM 1 Device 0     order for DME Equipment being ordered: flutter valve  Incentive spirometer 1 each 0     predniSONE  "(DELTASONE) 5 MG tablet Take 1 tablet (5 mg) by mouth daily 100 tablet 3     roflumilast (DALIRESP) 500 MCG TABS tablet Take 1 tablet (500 mcg) by mouth daily 31 tablet 3     rosuvastatin (CRESTOR) 10 MG tablet TAKE 1 TABLET (10 MG) BY MOUTH DAILY 90 tablet 3     sildenafil (VIAGRA) 100 MG tablet Take 1 tablet (100 mg) by mouth daily as needed 20 tablet 6     umeclidinium (INCRUSE ELLIPTA) 62.5 MCG/INH inhaler Inhale 1 puff into the lungs daily 1 Inhaler 1       Physical Exam:  /81   Pulse 59   Resp 16   Ht 1.778 m (5' 10\")   Wt 83.9 kg (185 lb)   SpO2 95%   BMI 26.54 kg/m      General: No apparent distress, appropriately groomed  Head: Normocephalic, atraumatic  Eyes: no icterus  Mouth: op pink and moist  Neck: Supple  Chest: Unlabored breathing with symmetric chest rise.   Skin: Warm, dry, intact  Psych: Mood pleasant, affect congruent    Results:  PFTs 5/3/2019   Ref. Range 6/1/2017 09:32 12/7/2017 09:31 5/3/2019 08:42   FVC-Pred Latest Units: L 4.12 4.10 4.07   FVC-Pre Latest Units: L 1.58 1.89 1.95   FVC-%Pred-Pre Latest Units: % 38 46 48   FEV1-Pre Latest Units: L 0.89 1.07 1.02   FEV1-%Pred-Pre Latest Units: % 28 34 33   FEV1FVC-Pred Latest Units: % 76 76 76   FEV1FVC-Pre Latest Units: % 56 56 52   FEV1SVC-Pred Latest Units: % 68 68 67   FEV1SVC-Pre Latest Units: % 43 52 53   FEV1FEV6-Pred Latest Units: % 78 78 78   FEV1FEV6-Pre Latest Units: % 57 56 53   FEFMax-Pred Latest Units: L/sec 8.17 8.11 8.03   FEFMax-Pre Latest Units: L/sec 2.84 3.17 4.05   FEFMax-%Pred-Pre Latest Units: % 34 39 50   FIFMax-Pre Latest Units: L/sec 3.32 3.49 3.81   ExpTime-Pre Latest Units: sec 10.16 9.01 10.71     PSG 2/12/2018  Sleep Architecture: Sleep fragmentation  The total recording time of the polysomnogram was 362.3 minutes. The total sleep time was 223.0 minutes. Sleep latency was 9.8 minutes. REM latency was 90.5 minutes. Arousal index was 78.3 arousals per hour. Sleep efficiency was 61.6%. Wake after sleep " onset was 78.5 minutes. The patient spent 15.5% of total sleep time in Stage N1, 62.3% in Stage N2, 0.0% in Stage N3, and 22.2% in REM. Time in REM supine was 0 minutes.     Respiration: Moderate sleep disordered breathing best characterized as obstructive with hypoxemia.  Of note the patient did not have REM supine during the study.      Events ? The polysomnogram revealed a presence of 33 obstructive, - central, and - mixed apneas resulting in an apnea index of 8.9 events per hour. There were 41 obstructive hypopneas and - central hypopneas resulting in an obstructive hypopnea index of 11.0 and central hypopnea index of - events per hour. The combined apnea/hypopnea index was 19.9 events per hour (central apnea/hypopnea index was - events per hour). The REM AHI was 42.4 events per hour. The supine AHI was 30.4 events per hour. The RERA index was 43.0 events per hour.  The RDI was 63.0 events per hour.    Snoring - was reported as mild-moderate.    Respiratory rate and pattern - was notable for normal respiratory rate and pattern.    Sustained Sleep Associated Hypoventilation - Transcutaneous carbon dioxide monitoring was used and hypoventilation was not present.    Sleep Associated Hypoxemia - (Greater than 5 minutes O2 sat at or below 88%) was present. Baseline oxygen saturation was 94.4%. Lowest oxygen saturation was 66.2%. Time spent less than or equal to 88% was 9.5 minutes. Time spent less than or equal to 89% was 11.4 minutes.     Movement Activity: The patient had elevated periodic limb movements (PLMs). The majority of these were not associated with cortical arousal.    Periodic Limb Activity - There were 74 PLMs during the entire study. The PLM index was 19.9 movements per hour. The PLM Arousal Index was 3.2 per hour.    REM EMG Activity - Excessive muscle activity was not present.    Nocturnal Behavior - Abnormal sleep related behaviors were not noted.     Bruxism - None apparent.     Cardiac Summary:  Sinus  The average pulse rate was 58.4 bpm. The minimum pulse rate was 50.8 bpm while the maximum pulse rate was 93.0 bpm.       Assessment:     Moderate sleep disordered breathing best characterized as obstructive with hypoxemia.  Of note the patient did not have REM supine during the study.     The patient had elevated periodic limb movements (PLMs). The majority of these were not associated with cortical arousal.     Impression/Plan:    Obstructive Sleep Apnea  COPD  HTN  CAD    Plan to start CPAP therapy, machine, supplies, and mask ordered. Will set Auto-CPAP 5-15 cm H2O. Explained STM, will have telephone visits at day 3, 14 and 30. Patient encouraged to use CPAP at night and with naps.     Plan for JERILYN in 1 month on CPAP but without using O2 to assess for oxygen need.    Patient is a former smoker and has been encouraged to continue to not smoke.    Never drive if tired or sleepy.     Encourage weight loss, healthy diet, and exercise.    Follow up in 3 months  ?  Seen and examined with Dr. Eliot Tejada   Sleep Medicine Fellow    Sleep Staff Addendum  I have seen and evaluated the patient today with the sleep fellow and agree with the documentation.      LAVERNE SANCHEZ MD

## 2019-07-22 NOTE — NURSING NOTE
"Chief Complaint   Patient presents with     Sleep Problem     Would like to discuss Rx for cpap        Initial /82   Pulse 59   Resp 16   Ht 1.778 m (5' 10\")   Wt 83.9 kg (185 lb)   SpO2 95%   BMI 26.54 kg/m   Estimated body mass index is 26.54 kg/m  as calculated from the following:    Height as of this encounter: 1.778 m (5' 10\").    Weight as of this encounter: 83.9 kg (185 lb).    Medication Reconciliation: complete    ESS 13    Alina Daley MA    "

## 2019-07-26 ENCOUNTER — HOSPITAL ENCOUNTER (OUTPATIENT)
Dept: BONE DENSITY | Facility: CLINIC | Age: 74
Discharge: HOME | End: 2019-07-26
Attending: INTERNAL MEDICINE | Admitting: INTERNAL MEDICINE
Payer: COMMERCIAL

## 2019-07-26 DIAGNOSIS — M85.80 OSTEOPENIA: ICD-10-CM

## 2019-07-26 PROCEDURE — 77080 DXA BONE DENSITY AXIAL: CPT

## 2019-07-30 ENCOUNTER — HOSPITAL ENCOUNTER (OUTPATIENT)
Facility: CLINIC | Age: 74
Discharge: HOME | End: 2019-07-30
Attending: SPECIALIST | Admitting: SPECIALIST
Payer: COMMERCIAL

## 2019-07-30 VITALS
BODY MASS INDEX: 26.48 KG/M2 | RESPIRATION RATE: 18 BRPM | OXYGEN SATURATION: 92 % | HEIGHT: 70 IN | DIASTOLIC BLOOD PRESSURE: 91 MMHG | HEART RATE: 74 BPM | WEIGHT: 185 LBS | SYSTOLIC BLOOD PRESSURE: 133 MMHG

## 2019-07-30 LAB — UPPER GI ENDOSCOPY: NORMAL

## 2019-07-30 PROCEDURE — G0500 MOD SEDAT ENDO SERVICE >5YRS: HCPCS | Performed by: SPECIALIST

## 2019-07-30 PROCEDURE — 43239 EGD BIOPSY SINGLE/MULTIPLE: CPT | Performed by: SPECIALIST

## 2019-07-30 PROCEDURE — 88305 TISSUE EXAM BY PATHOLOGIST: CPT | Performed by: SPECIALIST

## 2019-07-30 PROCEDURE — 88312 SPECIAL STAINS GROUP 1: CPT | Mod: 26 | Performed by: SPECIALIST

## 2019-07-30 PROCEDURE — 25000128 H RX IP 250 OP 636: Performed by: SPECIALIST

## 2019-07-30 PROCEDURE — 25000125 ZZHC RX 250: Performed by: SPECIALIST

## 2019-07-30 PROCEDURE — 88305 TISSUE EXAM BY PATHOLOGIST: CPT | Mod: 26,59 | Performed by: SPECIALIST

## 2019-07-30 PROCEDURE — 88312 SPECIAL STAINS GROUP 1: CPT | Performed by: SPECIALIST

## 2019-07-30 RX ORDER — LIDOCAINE 40 MG/G
CREAM TOPICAL
Status: DISCONTINUED | OUTPATIENT
Start: 2019-07-30 | End: 2019-07-30 | Stop reason: HOSPADM

## 2019-07-30 RX ORDER — ONDANSETRON 2 MG/ML
4 INJECTION INTRAMUSCULAR; INTRAVENOUS
Status: DISCONTINUED | OUTPATIENT
Start: 2019-07-30 | End: 2019-07-30 | Stop reason: HOSPADM

## 2019-07-30 RX ORDER — FENTANYL CITRATE 50 UG/ML
INJECTION, SOLUTION INTRAMUSCULAR; INTRAVENOUS PRN
Status: DISCONTINUED | OUTPATIENT
Start: 2019-07-30 | End: 2019-07-30 | Stop reason: HOSPADM

## 2019-07-30 ASSESSMENT — MIFFLIN-ST. JEOR: SCORE: 1600.4
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
## 2019-08-01 DIAGNOSIS — I10 BENIGN ESSENTIAL HYPERTENSION: ICD-10-CM

## 2019-08-01 RX ORDER — FUROSEMIDE 20 MG
10 TABLET ORAL DAILY
Qty: 45 TABLET | Refills: 1 | OUTPATIENT
Start: 2019-08-01

## 2019-08-01 NOTE — TELEPHONE ENCOUNTER
"Last Written Prescription Date:  4/12/19  Last Fill Quantity: 15 tablet,  # refills: 3   Last office visit: 6/13/2019 with prescribing provider:  Tasia   Future Office Visit:   Next 5 appointments (look out 90 days)    Aug 15, 2019 10:30 AM CDT  Office Visit with Yaneli Dozier MD  [facility] ([facility]) [address] [id number] [phone]         Requested Prescriptions   Pending Prescriptions Disp Refills     furosemide (LASIX) 20 MG tablet [Pharmacy Med Name: FUROSEMIDE 20 MG TABLET] 45 tablet 1     Sig: TAKE 0.5 TABLETS (10 MG) BY MOUTH DAILY       Diuretics (Including Combos) Protocol Failed - 8/1/2019  1:53 AM        Failed - Blood pressure under 140/90 in past 12 months     BP Readings from Last 3 Encounters:   07/30/19 (!) 133/91   07/22/19 152/81   06/13/19 122/68                 Passed - Recent (12 mo) or future (30 days) visit within the authorizing provider's specialty     Patient had office visit in the last 12 months or has a visit in the next 30 days with authorizing provider or within the authorizing provider's specialty.  See \"Patient Info\" tab in inbasket, or \"Choose Columns\" in Meds & Orders section of the refill encounter.              Passed - Medication is active on med list        Passed - Patient is age 18 or older        Passed - Normal serum creatinine on file in past 12 months     Recent Labs   Lab Test 04/18/19  2238   CR 0.84              Passed - Normal serum potassium on file in past 12 months     Recent Labs   Lab Test 04/18/19  2238   POTASSIUM 4.1                    Passed - Normal serum sodium on file in past 12 months     Recent Labs   Lab Test 04/18/19  2238                   "

## 2019-08-02 DIAGNOSIS — J44.1 COPD EXACERBATION (H): ICD-10-CM

## 2019-08-02 LAB — COPATH REPORT: NORMAL

## 2019-08-02 NOTE — TELEPHONE ENCOUNTER
"albuterol (PROAIR HFA) 108 (90 Base) MCG/ACT inhaler    Last Written Prescription Date:  10/22/2018  Last Fill Quantity: 8.5,  # refills: 3   Last office visit: 6/13/2019 with prescribing provider:  Tasia   Future Office Visit:   Next 5 appointments (look out 90 days)    Aug 15, 2019 10:30 AM CDT  Office Visit with Yaneli Dozier MD  [facility] ([facility]) [address] [facility] [address] [phone]           Requested Prescriptions   Pending Prescriptions Disp Refills     albuterol (PROAIR HFA) 108 (90 Base) MCG/ACT inhaler [Pharmacy Med Name: PROAIR HFA 90 MCG INHALER] 8.5 Inhaler 3     Sig: INHALE 2 PUFFS BY MOUTH EVERY 6 HOURS AS NEEDED FOR WHEEZE OR FOR SHORTNESS OF BREATH       Asthma Maintenance Inhalers - Anticholinergics Failed - 8/2/2019  1:20 AM        Failed - Asthma control assessment score within normal limits in last 6 months     Please review ACT score.           Passed - Patient is age 12 years or older        Passed - Medication is active on med list        Passed - Recent (6 mo) or future (30 days) visit within the authorizing provider's specialty     Patient had office visit in the last 6 months or has a visit in the next 30 days with authorizing provider or within the authorizing provider's specialty.  See \"Patient Info\" tab in inbasket, or \"Choose Columns\" in Meds & Orders section of the refill encounter.              "

## 2019-08-04 NOTE — RESULT ENCOUNTER NOTE
Assessment: The endoscopic and pathologic findings are consistent with esophageal candidiasis. Odynophagia, or pain on swallowing, is a typical symptom of esophageal candidiasis. The usual causative agent is almost always Candida albicans; however, other species are occasionally found (1,2), especially in treatment-resistant disease.     Recommendations: For esophageal disease, fluconazole 200 mg daily for 14 days is recommended.

## 2019-08-05 RX ORDER — ALBUTEROL SULFATE 90 UG/1
AEROSOL, METERED RESPIRATORY (INHALATION)
Qty: 8.5 INHALER | Refills: 0 | Status: SHIPPED | OUTPATIENT
Start: 2019-08-05 | End: 2019-08-28

## 2019-08-15 ENCOUNTER — OFFICE VISIT (OUTPATIENT)
Dept: FAMILY MEDICINE | Facility: CLINIC | Age: 74
End: 2019-08-15
Payer: COMMERCIAL

## 2019-08-15 VITALS
HEART RATE: 65 BPM | HEIGHT: 70 IN | OXYGEN SATURATION: 96 % | BODY MASS INDEX: 26.48 KG/M2 | WEIGHT: 185 LBS | DIASTOLIC BLOOD PRESSURE: 79 MMHG | SYSTOLIC BLOOD PRESSURE: 138 MMHG | TEMPERATURE: 97.3 F

## 2019-08-15 DIAGNOSIS — I10 BENIGN ESSENTIAL HYPERTENSION: Primary | ICD-10-CM

## 2019-08-15 DIAGNOSIS — R60.9 EDEMA, UNSPECIFIED TYPE: ICD-10-CM

## 2019-08-15 DIAGNOSIS — I10 BENIGN ESSENTIAL HYPERTENSION: ICD-10-CM

## 2019-08-15 DIAGNOSIS — Z79.52 CURRENT USE OF STEROID MEDICATION: ICD-10-CM

## 2019-08-15 DIAGNOSIS — J44.9 CHRONIC OBSTRUCTIVE PULMONARY DISEASE, UNSPECIFIED COPD TYPE (H): ICD-10-CM

## 2019-08-15 DIAGNOSIS — M05.79 RHEUMATOID ARTHRITIS INVOLVING MULTIPLE SITES WITH POSITIVE RHEUMATOID FACTOR (H): ICD-10-CM

## 2019-08-15 DIAGNOSIS — M48.062 SPINAL STENOSIS OF LUMBAR REGION WITH NEUROGENIC CLAUDICATION: ICD-10-CM

## 2019-08-15 LAB
ERYTHROCYTE [DISTWIDTH] IN BLOOD BY AUTOMATED COUNT: 15.5 % (ref 10–15)
ERYTHROCYTE [SEDIMENTATION RATE] IN BLOOD BY WESTERGREN METHOD: 9 MM/H (ref 0–20)
HCT VFR BLD AUTO: 46.9 % (ref 40–53)
HGB BLD-MCNC: 15.8 G/DL (ref 13.3–17.7)
MCH RBC QN AUTO: 31.4 PG (ref 26.5–33)
MCHC RBC AUTO-ENTMCNC: 33.7 G/DL (ref 31.5–36.5)
MCV RBC AUTO: 93 FL (ref 78–100)
PLATELET # BLD AUTO: 243 10E9/L (ref 150–450)
RBC # BLD AUTO: 5.03 10E12/L (ref 4.4–5.9)
WBC # BLD AUTO: 9.6 10E9/L (ref 4–11)

## 2019-08-15 PROCEDURE — 99214 OFFICE O/P EST MOD 30 MIN: CPT | Performed by: INTERNAL MEDICINE

## 2019-08-15 PROCEDURE — 85027 COMPLETE CBC AUTOMATED: CPT | Performed by: INTERNAL MEDICINE

## 2019-08-15 PROCEDURE — 85652 RBC SED RATE AUTOMATED: CPT | Performed by: INTERNAL MEDICINE

## 2019-08-15 PROCEDURE — 80053 COMPREHEN METABOLIC PANEL: CPT | Performed by: INTERNAL MEDICINE

## 2019-08-15 PROCEDURE — 36415 COLL VENOUS BLD VENIPUNCTURE: CPT | Performed by: INTERNAL MEDICINE

## 2019-08-15 RX ORDER — HYDROCODONE BITARTRATE AND ACETAMINOPHEN 5; 325 MG/1; MG/1
1 TABLET ORAL EVERY 4 HOURS PRN
Qty: 180 TABLET | Refills: 0 | Status: SHIPPED | OUTPATIENT
Start: 2019-08-15 | End: 2019-10-15

## 2019-08-15 RX ORDER — PREDNISONE 1 MG/1
4 TABLET ORAL DAILY
Qty: 120 TABLET | Refills: 11 | Status: SHIPPED | OUTPATIENT
Start: 2019-08-15 | End: 2019-08-27

## 2019-08-15 RX ORDER — METOPROLOL SUCCINATE 50 MG/1
50 TABLET, EXTENDED RELEASE ORAL DAILY
Qty: 90 TABLET | Refills: 3 | Status: SHIPPED | OUTPATIENT
Start: 2019-08-15 | End: 2019-12-16

## 2019-08-15 RX ORDER — FUROSEMIDE 20 MG
20 TABLET ORAL DAILY
Qty: 90 TABLET | Refills: 3 | Status: SHIPPED | OUTPATIENT
Start: 2019-08-15 | End: 2020-07-03

## 2019-08-15 RX ORDER — FUROSEMIDE 20 MG
10 TABLET ORAL DAILY
Qty: 45 TABLET | Refills: 1 | OUTPATIENT
Start: 2019-08-15

## 2019-08-15 ASSESSMENT — MIFFLIN-ST. JEOR: SCORE: 1600.4

## 2019-08-15 NOTE — PATIENT INSTRUCTIONS
Lumbar spine: The T-score is -0.3 from L1 to L4. There has been a 6.5  percent decrease in bone density since the prior examination. This is  statistically significant.     Hips: The left hip femoral neck T-score is -1.9. The right hip femoral  neck T-score is -2.3. Bone mineral density in the worst hip is 0.770  gm/cm2. There has been a 3.6 percent decrease in bone density since  the prior examination. This is statistically significant.    DISCUSS WITH RHEUMATOLOGY ABOUT DEXA SCAN  INFORM THAT YOU HAVE RA AND ALSO, IG DEFICIENCY    STOP NORVASC  ADD LASIX 20 MG DAILY-AM  INCREASE TOPROL TO 50 MG DAILY  REDUCE PREDNISONE TO 4 MG DAILY    REPEAT BMP 1 MONTH

## 2019-08-15 NOTE — TELEPHONE ENCOUNTER
"Last Written Prescription Date:  4/12/19  Last Fill Quantity: 15 tablet,  # refills: 3   Last office visit: 6/13/2019 with prescribing provider:  Tasia   Future Office Visit:   Next 5 appointments (look out 90 days)    Aug 15, 2019 10:30 AM CDT  Office Visit with Yaneli Dozier MD  [facility] ([facility]) [address] [facility] [id number] [phone]         Requested Prescriptions   Pending Prescriptions Disp Refills     furosemide (LASIX) 20 MG tablet [Pharmacy Med Name: FUROSEMIDE 20 MG TABLET] 45 tablet 1     Sig: TAKE 0.5 TABLETS (10 MG) BY MOUTH DAILY       Diuretics (Including Combos) Protocol Failed - 8/15/2019  9:09 AM        Failed - Blood pressure under 140/90 in past 12 months     BP Readings from Last 3 Encounters:   07/30/19 (!) 133/91   07/22/19 152/81   06/13/19 122/68                 Passed - Recent (12 mo) or future (30 days) visit within the authorizing provider's specialty     Patient had office visit in the last 12 months or has a visit in the next 30 days with authorizing provider or within the authorizing provider's specialty.  See \"Patient Info\" tab in inbasket, or \"Choose Columns\" in Meds & Orders section of the refill encounter.              Passed - Medication is active on med list        Passed - Patient is age 18 or older        Passed - Normal serum creatinine on file in past 12 months     Recent Labs   Lab Test 04/18/19  2238   CR 0.84              Passed - Normal serum potassium on file in past 12 months     Recent Labs   Lab Test 04/18/19  2238   POTASSIUM 4.1                    Passed - Normal serum sodium on file in past 12 months     Recent Labs   Lab Test 04/18/19  2238                   "
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
## 2019-08-16 ENCOUNTER — TRANSFERRED RECORDS (OUTPATIENT)
Dept: HEALTH INFORMATION MANAGEMENT | Facility: CLINIC | Age: 74
End: 2019-08-16

## 2019-08-16 LAB
ALBUMIN SERPL-MCNC: 3.7 G/DL (ref 3.4–5)
ALP SERPL-CCNC: 100 U/L (ref 40–150)
ALT SERPL W P-5'-P-CCNC: 29 U/L (ref 0–70)
ANION GAP SERPL CALCULATED.3IONS-SCNC: 7 MMOL/L (ref 3–14)
AST SERPL W P-5'-P-CCNC: 17 U/L (ref 0–45)
BILIRUB SERPL-MCNC: 0.5 MG/DL (ref 0.2–1.3)
BUN SERPL-MCNC: 19 MG/DL (ref 7–30)
CALCIUM SERPL-MCNC: 9.1 MG/DL (ref 8.5–10.1)
CHLORIDE SERPL-SCNC: 108 MMOL/L (ref 94–109)
CO2 SERPL-SCNC: 27 MMOL/L (ref 20–32)
CREAT SERPL-MCNC: 0.91 MG/DL (ref 0.66–1.25)
GFR SERPL CREATININE-BSD FRML MDRD: 84 ML/MIN/{1.73_M2}
GLUCOSE SERPL-MCNC: 81 MG/DL (ref 70–99)
POTASSIUM SERPL-SCNC: 4.7 MMOL/L (ref 3.4–5.3)
PROT SERPL-MCNC: 7.8 G/DL (ref 6.8–8.8)
SODIUM SERPL-SCNC: 142 MMOL/L (ref 133–144)

## 2019-08-27 ENCOUNTER — OFFICE VISIT (OUTPATIENT)
Dept: RHEUMATOLOGY | Facility: CLINIC | Age: 74
End: 2019-08-27
Payer: COMMERCIAL

## 2019-08-27 VITALS
OXYGEN SATURATION: 92 % | HEART RATE: 69 BPM | DIASTOLIC BLOOD PRESSURE: 70 MMHG | HEIGHT: 70 IN | SYSTOLIC BLOOD PRESSURE: 137 MMHG | WEIGHT: 185 LBS | BODY MASS INDEX: 26.48 KG/M2

## 2019-08-27 DIAGNOSIS — M85.89 OSTEOPENIA OF MULTIPLE SITES: ICD-10-CM

## 2019-08-27 DIAGNOSIS — Z79.899 HIGH RISK MEDICATIONS (NOT ANTICOAGULANTS) LONG-TERM USE: ICD-10-CM

## 2019-08-27 DIAGNOSIS — M05.79 RHEUMATOID ARTHRITIS INVOLVING MULTIPLE SITES WITH POSITIVE RHEUMATOID FACTOR (H): Primary | ICD-10-CM

## 2019-08-27 LAB
CALCIUM SERPL-MCNC: 9.8 MG/DL (ref 8.5–10.1)
PTH-INTACT SERPL-MCNC: 35 PG/ML (ref 18–80)

## 2019-08-27 PROCEDURE — 82310 ASSAY OF CALCIUM: CPT | Performed by: INTERNAL MEDICINE

## 2019-08-27 PROCEDURE — 36415 COLL VENOUS BLD VENIPUNCTURE: CPT | Performed by: INTERNAL MEDICINE

## 2019-08-27 PROCEDURE — 87340 HEPATITIS B SURFACE AG IA: CPT | Performed by: INTERNAL MEDICINE

## 2019-08-27 PROCEDURE — 86704 HEP B CORE ANTIBODY TOTAL: CPT | Performed by: INTERNAL MEDICINE

## 2019-08-27 PROCEDURE — 82306 VITAMIN D 25 HYDROXY: CPT | Performed by: INTERNAL MEDICINE

## 2019-08-27 PROCEDURE — 99204 OFFICE O/P NEW MOD 45 MIN: CPT | Performed by: INTERNAL MEDICINE

## 2019-08-27 PROCEDURE — 86618 LYME DISEASE ANTIBODY: CPT | Performed by: INTERNAL MEDICINE

## 2019-08-27 PROCEDURE — 83970 ASSAY OF PARATHORMONE: CPT | Performed by: INTERNAL MEDICINE

## 2019-08-27 RX ORDER — PREDNISONE 1 MG/1
TABLET ORAL
Qty: 42 TABLET | Refills: 0 | Status: SHIPPED | OUTPATIENT
Start: 2019-08-27 | End: 2019-10-01

## 2019-08-27 ASSESSMENT — MIFFLIN-ST. JEOR: SCORE: 1600.4

## 2019-08-27 NOTE — NURSING NOTE
RAPID3 (0-30) Cumulative Score  15.8          RAPID3 Weighted Score (divide #4 by 3 and that is the weighted score)  5.3     Kinjal Quinn [facility]  8/27/2019 1:41 PM
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
## 2019-08-28 DIAGNOSIS — J44.1 COPD EXACERBATION (H): ICD-10-CM

## 2019-08-28 LAB
B BURGDOR IGG+IGM SER QL: 0.03 (ref 0–0.89)
DEPRECATED CALCIDIOL+CALCIFEROL SERPL-MC: 33 UG/L (ref 20–75)
HBV CORE AB SERPL QL IA: NONREACTIVE
HBV SURFACE AG SERPL QL IA: NONREACTIVE

## 2019-08-28 NOTE — TELEPHONE ENCOUNTER
"albuterol (PROAIR HFA) 108 (90 Base) MCG/ACT inhaler 8.5 Inhaler 0 8/5/2019     Last Written Prescription Date:  8/5/2019  Last Fill Quantity: 8.5,  # refills: 0   Last office visit: 8/15/2019 with prescribing provider: JOAN Dozier    Future Office Visit:   Next 5 appointments (look out 90 days)    Oct 01, 2019  2:20 PM CDT  Return Visit with Niles Cedillo MD  [facility] [address] [place] [id number] [phone]         Requested Prescriptions   Pending Prescriptions Disp Refills     albuterol (PROAIR HFA) 108 (90 Base) MCG/ACT inhaler [Pharmacy Med Name: PROAIR HFA 90 MCG INHALER] 8.5 Inhaler 0     Sig: INHALE 2 PUFFS BY MOUTH EVERY 6 HOURS AS NEEDED FOR WHEEZE OR FOR SHORTNESS OF BREATH       Asthma Maintenance Inhalers - Anticholinergics Failed - 8/28/2019  1:42 AM        Failed - Asthma control assessment score within normal limits in last 6 months     Please review ACT score.           Passed - Patient is age 12 years or older        Passed - Medication is active on med list        Passed - Recent (6 mo) or future (30 days) visit within the authorizing provider's specialty     Patient had office visit in the last 6 months or has a visit in the next 30 days with authorizing provider or within the authorizing provider's specialty.  See \"Patient Info\" tab in inbasket, or \"Choose Columns\" in Meds & Orders section of the refill encounter.              "

## 2019-08-29 RX ORDER — ALBUTEROL SULFATE 90 UG/1
AEROSOL, METERED RESPIRATORY (INHALATION)
Qty: 8.5 INHALER | Refills: 1 | Status: SHIPPED | OUTPATIENT
Start: 2019-08-29 | End: 2019-12-02

## 2019-09-03 DIAGNOSIS — J44.1 COPD EXACERBATION (H): ICD-10-CM

## 2019-09-04 DIAGNOSIS — M47.816 LUMBAR ARTHROPATHY: ICD-10-CM

## 2019-09-04 RX ORDER — CARISOPRODOL 250 MG/1
TABLET ORAL
Qty: 120 TABLET | Refills: 1 | Status: SHIPPED | OUTPATIENT
Start: 2019-09-04 | End: 2019-09-19

## 2019-09-04 NOTE — TELEPHONE ENCOUNTER
"umeclidinium (INCRUSE ELLIPTA) 62.5 MCG/INH inhaler 1 Inhaler 1 4/4/2019  --   Sig - Route: Inhale 1 puff into the lungs daily - Inhalation     Last Written Prescription Date:  04/04/2019  Last Fill Quantity: 1 inh,  # refills: 1   Last office visit: 8/15/2019 with prescribing provider:     Future Office Visit:   Next 5 appointments (look out 90 days)    Oct 01, 2019  2:20 PM CDT  Return Visit with Niles Cedillo MD  [facility] [address] [place] [id number]  [phone]         Requested Prescriptions   Pending Prescriptions Disp Refills     INCRUSE ELLIPTA 62.5 MCG/INH Inhaler [Pharmacy Med Name: INCRUSE ELLIPTA 62.5 MCG INH]  2     Sig: TAKE 1 PUFF BY MOUTH EVERY DAY       Asthma Maintenance Inhalers - Anticholinergics Failed - 9/3/2019  1:22 AM        Failed - Asthma control assessment score within normal limits in last 6 months     Please review ACT score.           Passed - Patient is age 12 years or older        Passed - Medication is active on med list        Passed - Recent (6 mo) or future (30 days) visit within the authorizing provider's specialty     Patient had office visit in the last 6 months or has a visit in the next 30 days with authorizing provider or within the authorizing provider's specialty.  See \"Patient Info\" tab in inbasket, or \"Choose Columns\" in Meds & Orders section of the refill encounter.              "

## 2019-09-04 NOTE — TELEPHONE ENCOUNTER
Disp Refills Start End JESSICA   Carisoprodol 250 MG TABS 120 tablet 1 5/20/2019  No   Sig: TAKE 1 TABLET BY MOUTH THREE TIMES A DAY AS NEEDED     Last Written Prescription Date:  05/20/2019  Last Fill Quantity: 120,  # refills: 1   Last office visit: 8/15/2019 with prescribing provider:     Future Office Visit:   Next 5 appointments (look out 90 days)    Oct 01, 2019  2:20 PM CDT  Return Visit with Niles Cedillo MD  [facility] ([facility]) [address] [place] [id number]  [phone]        Routing refill request to provider for review/approval because:  Drug not on the [facility], [facility] or  Health refill protocol or controlled substance

## 2019-09-04 NOTE — TELEPHONE ENCOUNTER
ACT not needed - taking for COPD    Prescription approved per [facility] Refill Protocol.  Zaria HAYS RN

## 2019-09-06 ENCOUNTER — DOCUMENTATION ONLY (OUTPATIENT)
Dept: SLEEP MEDICINE | Facility: CLINIC | Age: 74
End: 2019-09-06

## 2019-09-06 NOTE — PROGRESS NOTES
Patient was offered choice of vendor and chose [facility].  Patient Harrison Thomas was set up at [place] on September 6, 2019. Patient received a Resmed AirSense 10 Auto. Pressures were set at 5-15 cm H2O.   Patient s ramp is 5 cm H2O for Auto and FLEX/EPR is 2.  Patient received a Resmed Mask name: AirFit N20  Nasal mask size Medium, heated tubing and heated humidifier.  Patient is enrolled in the STM Program and does not need to meet compliance. Patient has a follow up on 12/9/2019 with Dr. Mccabe.    Wilner Lang

## 2019-09-09 ENCOUNTER — DOCUMENTATION ONLY (OUTPATIENT)
Dept: SLEEP MEDICINE | Facility: CLINIC | Age: 74
End: 2019-09-09
Payer: COMMERCIAL

## 2019-09-09 DIAGNOSIS — M05.79 RHEUMATOID ARTHRITIS INVOLVING MULTIPLE SITES WITH POSITIVE RHEUMATOID FACTOR (H): ICD-10-CM

## 2019-09-09 NOTE — PROGRESS NOTES
3 DAY STM VISIT    Diagnostic AHI: 19.9 PSG    Patient contacted for 3 day STM visit  Message left for patient to return call.     Device type: Auto-CPAP  PAP settings from order::  CPAP min 5 cm  H20       CPAP max 15 cm  H20  Mask type:    Nasal Mask     Device settings from machine      Min CPAP 5.0            Max CPAP 15.0      Assessment: Nightly usage, most nights over four hours   Action plan: Pt to have f/u 14 day STM visit.  Patient has a follow up visit scheduled:   yes within 61-90 days of set up.    Total time spent on remote patient monitoring data analysis and patient contact today:   11 minutes

## 2019-09-10 NOTE — TELEPHONE ENCOUNTER
Requesting 90 day supply.      methotrexate 2.5 MG tablet 16 tablet 11 8/15/2019  No   Sig - Route: Take 4 tablets (10 mg) by mouth every 7 days      Last Written Prescription Date:  08/15/2019  Last Fill Quantity: 16,  # refills: 11   Last office visit: 8/15/2019 with prescribing provider:     Future Office Visit:   Next 5 appointments (look out 90 days)    Oct 01, 2019  2:20 PM CDT  [facility] [address] [place] [address] [id number] [phone]               Routing to RN's since recently sent in with refills, maybe they can ok with out needing to Rout refill request to provider for review/approval because:  Drug not on the FMG, UMP or  Health refill protocol or controlled substance

## 2019-09-11 NOTE — TELEPHONE ENCOUNTER
90 day supply pended with add'l refills.    To PCP to sign (non-RN protocol)    Thank you,  Niurka Amin RN on 9/11/2019 at 9:48 AM

## 2019-09-12 ENCOUNTER — ALLIED HEALTH/NURSE VISIT (OUTPATIENT)
Dept: NURSING | Facility: CLINIC | Age: 74
End: 2019-09-12
Payer: COMMERCIAL

## 2019-09-12 DIAGNOSIS — Z23 NEED FOR PROPHYLACTIC VACCINATION AND INOCULATION AGAINST INFLUENZA: Primary | ICD-10-CM

## 2019-09-12 DIAGNOSIS — E78.5 HYPERLIPIDEMIA LDL GOAL <100: ICD-10-CM

## 2019-09-12 DIAGNOSIS — I10 BENIGN ESSENTIAL HYPERTENSION: ICD-10-CM

## 2019-09-12 DIAGNOSIS — M05.79 RHEUMATOID ARTHRITIS INVOLVING MULTIPLE SITES WITH POSITIVE RHEUMATOID FACTOR (H): ICD-10-CM

## 2019-09-12 DIAGNOSIS — R60.9 EDEMA, UNSPECIFIED TYPE: ICD-10-CM

## 2019-09-12 LAB
ALBUMIN SERPL-MCNC: 3.9 G/DL (ref 3.4–5)
ALP SERPL-CCNC: 85 U/L (ref 40–150)
ALT SERPL W P-5'-P-CCNC: 30 U/L (ref 0–70)
ANION GAP SERPL CALCULATED.3IONS-SCNC: 7 MMOL/L (ref 3–14)
AST SERPL W P-5'-P-CCNC: 16 U/L (ref 0–45)
BASOPHILS # BLD AUTO: 0 10E9/L (ref 0–0.2)
BASOPHILS NFR BLD AUTO: 0.3 %
BILIRUB DIRECT SERPL-MCNC: 0.2 MG/DL (ref 0–0.2)
BILIRUB SERPL-MCNC: 0.7 MG/DL (ref 0.2–1.3)
BUN SERPL-MCNC: 18 MG/DL (ref 7–30)
CALCIUM SERPL-MCNC: 9 MG/DL (ref 8.5–10.1)
CHLORIDE SERPL-SCNC: 105 MMOL/L (ref 94–109)
CHOLEST SERPL-MCNC: 145 MG/DL
CO2 SERPL-SCNC: 29 MMOL/L (ref 20–32)
CREAT SERPL-MCNC: 1.04 MG/DL (ref 0.66–1.25)
CRP SERPL-MCNC: 3 MG/L (ref 0–8)
DIFFERENTIAL METHOD BLD: ABNORMAL
EOSINOPHIL # BLD AUTO: 0.3 10E9/L (ref 0–0.7)
EOSINOPHIL NFR BLD AUTO: 2.9 %
ERYTHROCYTE [DISTWIDTH] IN BLOOD BY AUTOMATED COUNT: 15.1 % (ref 10–15)
ERYTHROCYTE [SEDIMENTATION RATE] IN BLOOD BY WESTERGREN METHOD: 8 MM/H (ref 0–20)
GFR SERPL CREATININE-BSD FRML MDRD: 71 ML/MIN/{1.73_M2}
GLUCOSE SERPL-MCNC: 93 MG/DL (ref 70–99)
HCT VFR BLD AUTO: 45.7 % (ref 40–53)
HDLC SERPL-MCNC: 54 MG/DL
HGB BLD-MCNC: 15.2 G/DL (ref 13.3–17.7)
LDLC SERPL CALC-MCNC: 73 MG/DL
LYMPHOCYTES # BLD AUTO: 1.5 10E9/L (ref 0.8–5.3)
LYMPHOCYTES NFR BLD AUTO: 17.5 %
MCH RBC QN AUTO: 32 PG (ref 26.5–33)
MCHC RBC AUTO-ENTMCNC: 33.3 G/DL (ref 31.5–36.5)
MCV RBC AUTO: 96 FL (ref 78–100)
MONOCYTES # BLD AUTO: 1 10E9/L (ref 0–1.3)
MONOCYTES NFR BLD AUTO: 11.4 %
NEUTROPHILS # BLD AUTO: 5.8 10E9/L (ref 1.6–8.3)
NEUTROPHILS NFR BLD AUTO: 67.9 %
NONHDLC SERPL-MCNC: 91 MG/DL
PLATELET # BLD AUTO: 202 10E9/L (ref 150–450)
POTASSIUM SERPL-SCNC: 4.1 MMOL/L (ref 3.4–5.3)
PROT SERPL-MCNC: 7.3 G/DL (ref 6.8–8.8)
RBC # BLD AUTO: 4.75 10E12/L (ref 4.4–5.9)
SODIUM SERPL-SCNC: 141 MMOL/L (ref 133–144)
TRIGL SERPL-MCNC: 89 MG/DL
WBC # BLD AUTO: 8.6 10E9/L (ref 4–11)

## 2019-09-12 PROCEDURE — 90662 IIV NO PRSV INCREASED AG IM: CPT

## 2019-09-12 PROCEDURE — 85025 COMPLETE CBC W/AUTO DIFF WBC: CPT | Performed by: INTERNAL MEDICINE

## 2019-09-12 PROCEDURE — 85652 RBC SED RATE AUTOMATED: CPT | Performed by: INTERNAL MEDICINE

## 2019-09-12 PROCEDURE — 82248 BILIRUBIN DIRECT: CPT | Performed by: INTERNAL MEDICINE

## 2019-09-12 PROCEDURE — 90471 IMMUNIZATION ADMIN: CPT

## 2019-09-12 PROCEDURE — 99207 ZZC NO CHARGE NURSE ONLY: CPT

## 2019-09-12 PROCEDURE — 36415 COLL VENOUS BLD VENIPUNCTURE: CPT | Performed by: INTERNAL MEDICINE

## 2019-09-12 PROCEDURE — 80061 LIPID PANEL: CPT | Performed by: INTERNAL MEDICINE

## 2019-09-12 PROCEDURE — 86140 C-REACTIVE PROTEIN: CPT | Performed by: INTERNAL MEDICINE

## 2019-09-12 PROCEDURE — 80053 COMPREHEN METABOLIC PANEL: CPT | Performed by: INTERNAL MEDICINE

## 2019-09-16 NOTE — PROGRESS NOTES
Patient returned call.    Subjective measures:  Pt reports things are going horrible, he would like to switch to a nasal pillow mask.     Assessment: Pt not meeting objective benchmarks for compliance  Patient failing following subjective benchmarks: mask discomfort     Action plan:call transferred to [facility]       Total time spent on remote patient monitoring data analysis and patient contact today:   7 minutes

## 2019-09-19 DIAGNOSIS — M47.816 LUMBAR ARTHROPATHY: ICD-10-CM

## 2019-09-19 RX ORDER — CARISOPRODOL 250 MG/1
250 TABLET ORAL EVERY 4 HOURS PRN
Qty: 120 TABLET | Refills: 1 | Status: CANCELLED | OUTPATIENT
Start: 2019-09-19

## 2019-09-19 RX ORDER — CARISOPRODOL 250 MG/1
250 TABLET ORAL EVERY 4 HOURS PRN
Qty: 120 TABLET | Refills: 1 | Status: SHIPPED | OUTPATIENT
Start: 2019-09-19 | End: 2019-11-25

## 2019-09-19 NOTE — TELEPHONE ENCOUNTER
"Patient walked into clinic to request new RX Carisoprodol 250mg tablets.     Pharmacy will not fill RX from 9-4-19 until 10-5-19.      Called and confirmed this with pharmacy.    Last refill 7-28-19 #120 (40 day supply).  Currently at 40 days.    Patient confirmed he does take the med TID most days.      Pharmacist states RX will be accepted and processed with different \"sig\"---  Advised:  1 tab every 4 hours as needed----instead of TID as needed.     New RX pended for review and approval if appropriate.     Triage with contact patient after Dr Dozier reviews.   [phone]  May leave detailed msg/orders on voice mail.       Thank you,  Brenda CAMEJO RN,BSN    "

## 2019-09-23 ENCOUNTER — DOCUMENTATION ONLY (OUTPATIENT)
Dept: SLEEP MEDICINE | Facility: CLINIC | Age: 74
End: 2019-09-23

## 2019-09-23 NOTE — PROGRESS NOTES
14  DAY STM VISIT    Diagnostic AHI: 19.9  PSG    Message left for patient to return call-mask fit issues on last STM call. Pt wanted to change to a nasal pillow style mask.      Assessment: Pt not meeting objective benchmarks for compliance       Action plan: waiting for patient to return call.       Device type: Auto-CPAP ()    PAP settings: CPAP min 5.0 (Minimum allowable pressure in cmH2O) cm  H20       CPAP max 15.0 (Maximum allowable pressure in cmH2O) cm  H20         Mask type:  Nasal Mask    Objective measures: 14 day rolling measures      Compliance  0 %           Average number of minutes 3      Objective measure goal  Compliance   Goal >70%  Leak   Goal < 24 lpm  AHI  Goal < 5  Usage  Goal >240      Total time spent on remote patient monitoring data analysis and patient contact today:   10 minutes

## 2019-09-23 NOTE — PROGRESS NOTES
Patient returned call.    Subjective measures:   Pt reports that things are going better much better.      Assessment: Pt not meeting objective benchmarks for compliance  Patient meeting subjective benchmarks.     Action plan:[facility]    Total time spent on remote patient monitoring data analysis and patient contact today:   12 minutes

## 2019-09-26 ENCOUNTER — TELEPHONE (OUTPATIENT)
Dept: FAMILY MEDICINE | Facility: CLINIC | Age: 74
End: 2019-09-26

## 2019-09-26 NOTE — TELEPHONE ENCOUNTER
Prior Authorization Retail Medication Request    Medication/Dose: Carisoprodol 250 mg  ICD code (if different than what is on RX):  M47.816  Previously Tried and Failed:  Cyclobenzaprine, Tizanidine  Rationale:  Patient stable on current medication for the relief of low back pain.     Insurance Name:  [employer]  Insurance ID:  [id number]      Pharmacy Information (if different than what is on RX)  Name:  [id number]  Phone:  [phone]

## 2019-09-30 NOTE — TELEPHONE ENCOUNTER
Prior Authorization Approval    Authorization Effective Date: 8/31/2019  Authorization Expiration Date: 9/29/2020  Medication: Carisoprodol 250 mg  Approved Dose/Quantity:    Reference #: [id number]   Insurance Company: [employer] - Phone [phone] Fax [phone]  Expected CoPay:       CoPay Card Available:      Foundation Assistance Needed:    Which Pharmacy is filling the prescription (Not needed for infusion/clinic administered): [id number] - [place], [place] - [address] [place] [address]  Pharmacy Notified: Yes  Patient Notified: Yes **Instructed pharmacy to notify patient when script is ready to /ship.**

## 2019-09-30 NOTE — TELEPHONE ENCOUNTER
Central Prior Authorization Team   Phone: [phone]      PA Initiation    Medication: Carisoprodol 250 mg  Insurance Company: [employer] - Phone [phone] Fax [phone]  Pharmacy Filling the Rx: [id number] - [place], [place] - [address] [place] [address]  Filling Pharmacy Phone: [phone]  Filling Pharmacy Fax:    Start Date: 9/30/2019

## 2019-10-01 ENCOUNTER — HEALTH MAINTENANCE LETTER (OUTPATIENT)
Age: 74
End: 2019-10-01

## 2019-10-01 ENCOUNTER — OFFICE VISIT (OUTPATIENT)
Dept: RHEUMATOLOGY | Facility: CLINIC | Age: 74
End: 2019-10-01
Payer: COMMERCIAL

## 2019-10-01 VITALS
HEIGHT: 70 IN | WEIGHT: 186 LBS | HEART RATE: 63 BPM | BODY MASS INDEX: 26.63 KG/M2 | SYSTOLIC BLOOD PRESSURE: 164 MMHG | DIASTOLIC BLOOD PRESSURE: 80 MMHG | OXYGEN SATURATION: 94 %

## 2019-10-01 DIAGNOSIS — M05.79 RHEUMATOID ARTHRITIS INVOLVING MULTIPLE SITES WITH POSITIVE RHEUMATOID FACTOR (H): Primary | ICD-10-CM

## 2019-10-01 DIAGNOSIS — M79.642 PAIN IN BOTH HANDS: ICD-10-CM

## 2019-10-01 DIAGNOSIS — M25.531 BILATERAL WRIST PAIN: ICD-10-CM

## 2019-10-01 DIAGNOSIS — M79.641 PAIN IN BOTH HANDS: ICD-10-CM

## 2019-10-01 DIAGNOSIS — M25.532 BILATERAL WRIST PAIN: ICD-10-CM

## 2019-10-01 PROCEDURE — 99214 OFFICE O/P EST MOD 30 MIN: CPT | Performed by: INTERNAL MEDICINE

## 2019-10-01 ASSESSMENT — MIFFLIN-ST. JEOR: SCORE: 1599.94

## 2019-10-01 NOTE — PATIENT INSTRUCTIONS
To request medical records, please contact our Health Information Management Department at phone number [phone]; fax number [phone].   This is so that you can request that a CD with the x-ray images of your right foot be sent to your orthopedic surgeon.   Rheumatology    Dr. Niles Cedillo         [facility]   (Monday)  [address]  [place] [address]       [facility]   (Tuesday)  [address] [place] [address]    [facility]   (Wed., Thurs., and Friday)  [address] [place] [address]    Phone number: [phone]  Thank you for choosing [facility].  WILLIAM Lazo RMA

## 2019-10-01 NOTE — NURSING NOTE
RAPID3 (0-30) Cumulative Score  17.5          RAPID3 Weighted Score (divide #4 by 3 and that is the weighted score)  5.71
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
## 2019-10-04 DIAGNOSIS — M48.062 SPINAL STENOSIS OF LUMBAR REGION WITH NEUROGENIC CLAUDICATION: ICD-10-CM

## 2019-10-04 NOTE — TELEPHONE ENCOUNTER
Controlled Substance Refill Request for Norco  Problem List Complete:    Yes    Last Written Prescription Date:  08/15/19  Last Fill Quantity: 180,   # refills: 0    THE MOST RECENT OFFICE VISIT MUST BE WITHIN THE PAST 3 MONTHS. AT LEAST ONE FACE TO FACE VISIT MUST OCCUR EVERY 6 MONTHS. ADDITIONAL VISITS CAN BE VIRTUAL.  (THIS STATEMENT SHOULD BE DELETED.)    Last Office Visit with [facility] primary care provider: 08/15/19    Future Office visit:   Next 5 appointments (look out 90 days)    Dec 31, 2019  1:00 PM CST  Return Visit with Niles Cedillo MD  [facility] ([facility]) [address] [place] [id number]  [phone]          Controlled substance agreement:   Encounter-Level CSA - 05/26/2017:    Controlled Substance Agreement - Scan on 6/6/2017  3:48 PM: CONTROLLED SUBSTANCE AGREEMENT     Encounter-Level CSA - 11/14/2016:    Controlled Substance Agreement - Scan on 11/18/2016  7:45 AM: CONTROLLED SUBSTANCE AGREEMENT     Patient-Level CSA:    There are no patient-level csa.         Last Urine Drug Screen: No results found for: CDAUT, No results found for: COMDAT,   Cannabinoids (68-lwd-8-carboxy-9-THC)   Date Value Ref Range Status   08/27/2018 Not Detected NDET^Not Detected ng/mL Final     Comment:     Cutoff for a negative cannabinoid is 50 ng/mL or less.     Phencyclidine (Phencyclidine)   Date Value Ref Range Status   08/27/2018 Not Detected NDET^Not Detected ng/mL Final     Comment:     Cutoff for a negative PCP is 25 ng/mL or less.     Cocaine (Benzoylecgonine)   Date Value Ref Range Status   08/27/2018 Not Detected NDET^Not Detected ng/mL Final     Comment:     Cutoff for a negative cocaine is 150 ng/ml or less.     Methamphetamine (d-Methamphetamine)   Date Value Ref Range Status   08/27/2018 Not Detected NDET^Not Detected ng/mL Final     Comment:     Cutoff for a negative methamphetamine is 500 ng/ml or less.     Opiates (Morphine)   Date Value Ref  Range Status   08/27/2018 Detected, Abnormal Result (A) NDET^Not Detected ng/mL Final     Comment:     Cutoff for a positive opiate is greater than 100 ng/ml.  This is an unconfirmed screening result to be used for medical purposes only.   Order [id number] for confirmation or individual confirmation tests to [facility].       Amphetamine (d-Amphetamine)   Date Value Ref Range Status   08/27/2018 Not Detected NDET^Not Detected ng/mL Final     Comment:     Cutoff for a negative amphetamine is 500 ng/mL or less.     Benzodiazepines (Nordiazepam)   Date Value Ref Range Status   08/27/2018 Detected, Abnormal Result (A) NDET^Not Detected ng/mL Final     Comment:     Cutoff for a positive benzodiazepines is greater than 150 ng/ml.  This is an unconfirmed screening result to be used for medical purposes only.   Order [id number] for confirmation or individual confirmation tests to [facility].       Tricyclic Antidepressants (Desipramine)   Date Value Ref Range Status   08/27/2018 Not Detected NDET^Not Detected ng/mL Final     Comment:     Cutoff for a negative tricyclic antidepressant is 300 ng/ml or less.     Methadone (Methadone)   Date Value Ref Range Status   08/27/2018 Not Detected NDET^Not Detected ng/mL Final     Comment:     Cutoff for a negative methadone is 200 ng/ml or less.     Barbiturates (Butalbital)   Date Value Ref Range Status   08/27/2018 Not Detected NDET^Not Detected ng/mL Final     Comment:     Cutoff for a negative barbituate is 200 ng/ml or less.     Oxycodone (Oxycodone)   Date Value Ref Range Status   08/27/2018 Not Detected NDET^Not Detected ng/mL Final     Comment:     Cutoff for a negative Oxycodone is 100 ng/mL or less.     Propoxyphene (Norpropoxyphene)   Date Value Ref Range Status   08/27/2018 Not Detected NDET^Not Detected ng/mL Final     Comment:     Cutoff for a negative propoxyphene is 300 ng/ml or less     Buprenorphine (Buprenorphine)   Date Value Ref Range Status   08/27/2018 Not Detected NDET^Not  Detected ng/mL Final     Comment:     Cutoff for a negative buprenorphine is 10 ng/ml or less        Processing:  Fax Rx to [facility]    [email or web address]   checked in past 3 months?  Yes 07/17/19     Bita Hernandez MA

## 2019-10-04 NOTE — TELEPHONE ENCOUNTER
Reason for Call:  Medication or medication refill:    Do you use a [facility]?  Name of the pharmacy and phone number for the current request:     [facility]ACY #[id number] - [facility] [address] [place] [address]    Name of the medication requested: HYDROcodone-acetaminophen (NORCO) 5-325 MG tablet     Can we leave a detailed message on this number? YES    Phone number patient can be reached at: [phone]    Best Time: any    Call taken on 10/4/2019 at 1:18 PM by Suri Santiago

## 2019-10-07 ENCOUNTER — DOCUMENTATION ONLY (OUTPATIENT)
Dept: SLEEP MEDICINE | Facility: CLINIC | Age: 74
End: 2019-10-07

## 2019-10-07 NOTE — PROGRESS NOTES
30 DAY [facility] VISIT    Diagnostic AHI: 19.9   PSG    Message left for patient to return call     Assessment: Pt not meeting objective benchmarks for compliance     Action plan: waiting for patient to return call.   Patient has scheduled a follow up visit with Dr. Mccabe on 12/9/2019.   Device type: Auto-CPAP  PAP settings: CPAP min 5.0 cm  H20     CPAP max 15.0 cm  H20       Mask type:  Nasal Mask  Objective measures: 14 day rolling measures      Compliance  0 %         Average number of minutes 0      Objective measure goal  Compliance   Goal >70%  Leak   Goal < 24 lpm  AHI  Goal < 5  Usage  Goal >240      Total time spent on remote patient monitoring data analysis and patient contact today:   10  minutes

## 2019-10-08 ENCOUNTER — OFFICE VISIT (OUTPATIENT)
Dept: PODIATRY | Facility: CLINIC | Age: 74
End: 2019-10-08
Payer: COMMERCIAL

## 2019-10-08 ENCOUNTER — TELEPHONE (OUTPATIENT)
Dept: PODIATRY | Facility: CLINIC | Age: 74
End: 2019-10-08

## 2019-10-08 VITALS
SYSTOLIC BLOOD PRESSURE: 138 MMHG | WEIGHT: 184.2 LBS | DIASTOLIC BLOOD PRESSURE: 78 MMHG | BODY MASS INDEX: 26.37 KG/M2 | HEIGHT: 70 IN | HEART RATE: 60 BPM

## 2019-10-08 DIAGNOSIS — L84 CORN OF TOE: ICD-10-CM

## 2019-10-08 DIAGNOSIS — M20.5X1 HALLUX LIMITUS OF RIGHT FOOT: ICD-10-CM

## 2019-10-08 DIAGNOSIS — M20.41 HAMMER TOE OF RIGHT FOOT: Primary | ICD-10-CM

## 2019-10-08 PROCEDURE — 99204 OFFICE O/P NEW MOD 45 MIN: CPT | Performed by: PODIATRIST

## 2019-10-08 ASSESSMENT — MIFFLIN-ST. JEOR: SCORE: 1591.78

## 2019-10-08 NOTE — TELEPHONE ENCOUNTER
Spoke to patient. Patient wants to think over surgery and consult with family before moving forward with surgery.     Patient will call when he is ready.       Ariadna Wilhelm, Surgery Scheduler
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
## 2019-10-08 NOTE — PATIENT INSTRUCTIONS
"SURGERY PLANNING CHECKLIST  If you have decided to have surgery, follow these few steps to get the procedure scheduled and to have the proper paperwork filled out.  If you are unsure about surgery, or would like to sit down and further discuss your issue and treatment options, please make a clinic appointment with Dr Bates.    1.  Pick the date that you would like to have surgery.  Keep in mind that you will likely need at least 2 weeks off after the procedure for proper rest and healing.    2.  Call the surgery scheduling line at [phone] to get the procedure scheduled.    3.  Make an appointment to see Dr Bates within 1 week of the date of surgery for your pre-operative consult.  When making the appointment, say \"I need to make a 30 minute surgical consult with Dr Bates\".  It is recommended that you bring a spouse, family member or friend with you.  There will be lots of information presented.  It can be overwhelming, and it is better to have someone there to help sort out the details.    4.  Make an appointment to see your Primary Care Physician within 4 weeks of the date of surgery for your \"Pre-operative History and Physical\".  This is done to make sure you are medically healthy to undergo surgery.    * If you have any post-operative questions regarding your procedure, call our triage team at the [facility] at [phone] (option 2 > option 3).            Thank you for choosing [facility]!    Follow up as needed    DR. BATES'S CLINIC LOCATIONS     MONDAY  [place] TUESDAY & FRIDAY AM  [place]   [address] [facility]   [address]   [place] [address] JAMIL Sommer [address]   [phone]  [phone]  [phone]       WEDNESDAY  [place] SCHEDULE SURGERY: [phone]   [address] [facility] APPOINTMENTS: [phone]   JAMIL Metz [address] BILLING QUESTIONS: [phone]   FX " [phone]         ROUTINE FOOT CARE    [employer]   $55 nails - $10 calluses  [phone]  (Travels to your home) [employer] - $40  [phone]  [email or web address] for locations or they can come to your home   [employer]  [phone]  [place] [facility] [place] [facility] [employer]  [phone]  (Travels to your home)   Heydi Loza, DPM  [address], [place] [id number] [phone] Jorge Hackett, DPM  [address] [place] [address] [phone]   [facility]  [address] [place] [address] [phone] [facility]   [phone]  [facility]   [facility]  [phone]  [facility]  (does not take BCBS) [facility]   [phone]     CALLUSES / CORNS / IPKs / POROKERATOSIS  When there is excessive friction or pressure on the skin, the body responds by making the skin thicker to protect the deeper structures from becoming exposed. While this works well to protect the deeper structures, the thickened skin can increase pressure and pain.    Flat, diffuse thickening are simple calluses and they are usually caused by friction. Often these are the result of rubbing on a shoe or going barefoot.    Calluses with a central core between the toes are called corns. These result from prominent joints on adjacent toes rubbing together.  Theses are a symptom of bone malalignment and will always recur unless the underlying bones are addressed surgically.    Calluses with a central core on the ball of the foot are usually IPKs (intractable plantar keratosis). These are caused by excessive pressure from the metatarsals, the bones that make up the ball of the foot. Often one of these bones is too long or too prominent. Again, these will always recur unless the underlying bone issue is addressed. There is no cure for these. They will either go away by themselves, recur, or more could develop.    Regardless of the  diagnosis, most of these lesions can be kept comfortable with routine maintenance.   1.This consists of filing them with a pumice stone or callus file a couple of times per week.    2. Lotion can be applied to soften the callus. A urea based cream such as Kerasal or Vanicream or thicker cream with shea butter are good options.  3. Toe spacers or toe covers can be used for corns, gel pads can be used for other lesions on the bottom of the foot.   If there is a surgical pathology noted, such as a prominent bone, often this needs to be addressed surgically to minimize recurrence. However, sometimes the lesion simply migrates to another spot after surgery, so it is not a guaranteed cure.     Please call with any additional questions.     We discussed the potential costs of callus trimming including the possibility this may not be covered under insurance. Cost of this can be around $150 if paying out of pocket.    HAMMERTOES  Hammertoe is a contracture (bending) of one or both joints of the second, third, fourth, or fifth (little) toes. This abnormal bending can put pressure on the toe when wearing shoes, causing problems to develop.  Hammertoes usually start out as mild deformities and get progressively worse over time. In the earlier stages, hammertoes are flexible and the symptoms can often be managed with noninvasive measures. But if left untreated, hammertoes can become more rigid and will not respond to non-surgical treatment.  Because of the progressive nature of hammertoes, they should receive early attention. Hammertoes never get better without some kind of intervention.  CAUSES  The most common cause of hammertoe is a muscle/tendon imbalance. This imbalance, which leads to a bending of the toe, results from mechanical (structural) changes in the foot that occur over time in some people.  Hammertoes may be aggravated by shoes that don t fit properly. A hammertoe may result if a toe is too long and is forced into  a cramped position when a tight shoe is worn.  Occasionally, hammertoe is the result of an earlier trauma to the toe. In some people, hammertoes are inherited.  SYMPTOMS  Common symptoms of hammertoes include:  Pain or irritation of the affected toe when wearing shoes.   Corns and calluses (a buildup of skin) on the toe, between two toes, or on the ball of the foot. Corns are caused by constant friction against the shoe. They may be soft or hard, depending upon their location.   Inflammation, redness, or a burning sensation   Contracture of the toe   In more severe cases of hammertoe, open sores may form.   DIAGNOSIS  Although hammertoes are readily apparent, to arrive at a diagnosis the foot and ankle surgeon will obtain a thorough history of your symptoms and examine your foot. During the physical examination, the doctor may attempt to reproduce your symptoms by manipulating your foot and will study the contractures of the toes. In addition, the foot and ankle surgeon may take x-rays to determine the degree of the deformities and assess any changes that may have occurred.   Hammertoes are progressive - they don t go away by themselves and usually they will get worse over time. However, not all cases are alike - some hammertoes progress more rapidly than others. Once your foot and ankle surgeon has evaluated your hammertoes, a treatment plan can be developed that is suited to your needs.  NON-SURGICAL TREATMENT  There is a variety of treatment options for hammertoe. The treatment your foot and ankle surgeon selects will depend upon the severity of your hammertoe and other factors.  A number of non-surgical measures can be undertaken:  Padding corns and calluses. Your foot and ankle surgeon can provide or prescribe pads designed to shield corns from irritation. If you want to try over-the-counter pads, avoid the medicated types. Medicated pads are generally not recommended because they may contain a small amount of  acid that can be harmful. Consult your surgeon about this option.   Changes in shoewear. Avoid shoes with pointed toes, shoes that are too short, or shoes with high heels - conditions that can force your toe against the front of the shoe. Instead, choose comfortable shoes with a deep, roomy toe box and heels no higher than two inches.   Orthotic devices. A custom orthotic device placed in your shoe may help control the muscle/tendon imbalance.   Injection therapy. Corticosteroid injections are sometimes used to ease pain and inflammation caused by hammertoe.   Medications. Oral nonsteroidal anti-inflammatory drugs (NSAIDs), such as ibuprofen, may be recommended to reduce pain and inflammation.   Splinting/strapping. Splints or small straps may be applied by the surgeon to realign the bent toe.     SURGICAL TREATMENT  Hammertoe surgery is complex. The surgical procedure is an attempt to help the toe lay in a better position. Nearly every structure in the toe will be cut including the tendons, ligaments, skin and bone. Hammertoes are a complex deformity and final toe position is difficult to predict. The only sure way to position a toe is to fuse all three digital joints. That will not happen as some degree of toe motion is needed for walking. The toe may not be completely reduced as the surrounding skin and other structures may not allow the toe to return to a normal position. The tendons on adjacent toes may need to be cut at the time of the original or subsequent surgeries, as interconnections exist between the toes. The toe may drift after surgery. Stiffness may develop leading to new areas of pressure.   Future shoe choices will be critical in allowing the surgery to provide comfort. The toes will still hurt if shoes rub. The original pain may also persist as other foot problems may be contributing to the current pain. The toe may or may not be pinned in place. External pins would require complete avoidance of  "water on the foot for six weeks. The pin would be removed about six weeks after the surgery. Strict attention to protection is critical. The pin could get bumped or loosen resulting in early removal. Removal might be necessary before the bone heals which would negatively affect the final surgical outcome and toe alignment.     DEGENERATIVE ARTHRITIS OF THE BIG TOE JOINT   (hallux limitus/hallux rigidus)   Arthritis of the joint at the base of the big toe (metatarsophalangeal joint) has several causes. Usually it results from repetitive trauma to the joint, secondary to abnormal foot mechanics. Often it is hereditary. However, a one-time traumatic event can lead to arthritis. The condition doesn't improve with time, and even with treatment, can worsen. The cartilage wears out, joint surfaces are no longer smooth, bone rubs on bone, inflammation occurs with pain, and eventually bone spurs and loose fragments might develop.   The joint is often painful with activity, worse with flimsy shoes or walking barefoot, and it slowly progresses over time. A person might notice the toe \"locking up\" with walking. There often is an obvious, and irritating, bony bump on top of the foot. Shoes might be uncomfortable. In some people the pain is so bothersome that recreational activities sometimes even normal daily activities are difficult to perform.   The pain from this arthritis is likely a combination of joint jamming, cartilage loss and inflammation, and irritation from shoes rubbing on the bump. Sometimes other parts of the foot, leg, or back hurt from altering one's walk to compensate for the painful joint.     Ways to help a person live with the discomfort include wearing a good, supportive shoe with a rigid, rocker-type bottom. An example is a hiking boot. A rigid sole minimizes bending of the joint, and therefore, joint motion and pain. Shoes with a high toe box allow for less rubbing on the bump. Avoiding barefoot walking, " sandals, flip-flops and slippers usually helps.   Sometimes an insert or orthotic provides symptom relief. This might make shoe fit more difficult. Pads over the bump and occasionally injections into the joint provide relief.   Surgery for this condition is aimed towards alleviating pain. It does not cure the arthritis nor does it guarantee better joint motion. Depending on the condition of the metatarsophalangeal joint, there are several surgiqal options:    1.  Cutting off the bony bump(s) and cleaning the joint    2.  Loosening the joint up by making cuts in the first metatarsal bone or the big toe bone and removing a small section of bone.    3.  Repositioning bone to minimize jamming of the joint.    4.  In severe cases, the joint is fused. By fusing the joint, it will never bend again. This resolves the pain, because it's the movement of a worn out joint that causes pain. Oftentimes the operation involves a combination of these procedures and. requires the use of screws, pins, and/or a small surgical plate.     Healing after surgery requires about six weeks of protection. This allows the bone to heal. Maximum recovery takes about one year. The scar tissue and joint structures require this amount of time to finish the healing process. Expect stiffness, swelling and numbness during that time frame.   Surgery for arthritis of the metatarsophalangeal joint does involve side effects. Some side effects are predictable and others are less common but do occur. A scar will be visible and could be irritated by shoes. The shoe may rub on the screw or internal pin requiring surgical removal of these fixation devices. The screw and pin would likely be left in place for a full year. The first toe may remain stiff after surgery. The amount of stiffness is variable. Most people never regain normal motion of the first toe. This is due to scar tissue inherent to any surgery, in addition to the cumUlative effects of arthritis.  Sometimes the big toe drifts to one side or the other. Joint fusion is one option to correct an unstable, drifting toe. This procedure straightens the toe, however, no motion remains.   All surgical procedures involve risk of infection, numbness, pain, delayed bone healing, osteotomy (bone cut) dislocation, blood clots, continued foot pain, etc. Arthritic joint surgery is quite complex and should not be taken lightly.    Any skin incision can lead to infection. Deep infection might involve the bone and thus repeat surgery and six weeks of IV antibiotics. Scar tissue can cause nerve pain or numbness. his is generally temporary but can be permanent. We do not have treatments that cure nerve problems. Second toe pain could be related to altered mechanics and pressure transferred to the second toe. Delayed bone healing would lengthen the healing time. Some bones simply do not heal. This requires repeat surgery, electronic bone stimulation and/or extended protection. Smokers have an approximate 20% chance of poor bone healing. This is double that of a non-smoker. The bone cut may displace. This may need to be repaired with a second operation. Displacement can cause joint malalignment. Immobility after surgery can cause a blood clot in the legs and lungs. This could result in death.   Foot pain is complex. Most feet hurt for more than one reason. Operating on the arthritic   big toe joint will not necessarily create a pain free foot. Appropriate shoes, healthy body weight, avoidance of bare foot walking and moderation of activity will always be   necessary to enjoy foot comfort. Arthritis is incurable even with surgery.     Surgery for this type of arthritis is nevertheless quite successful. Most surgical patients are pleased with their foot following surgery. Many of the issues described above can be controlled by taking proper care of your foot during the healing process.   Cosmetic bump surgery is discouraged for the  reasons listed above. A bump and joint that is comfortable when wearing appropriate shoes should simply be treated with appropriate shoes.   Your surgeon would be happy to fully describe any of the above issues. You should pursue a full understanding of the operation, recovery process and any potential problems that could develop.       BODY WEIGHT AND YOUR FEET  The following information is included in the after visit summary for all patients. Body weight can be a sensitive issue to discuss in clinic, but we think the following information is very important. Although we focus on the feet and ankles, we do support the overall health of our patients.     Many things can cause foot and ankle problems. Foot structure, activity level, foot mechanics and injuries are common causes of pain. One very important issue that often goes unmentioned, is body weight. Extra weight can cause increased stress on muscles, ligaments, bones and tendons. Sometimes just a few extra pounds is all it takes to put one over her/his threshold. Without reducing that stress, it can be difficult to alleviate pain. As Foot & Ankle specialists, our job is addressing the lower extremity problem and possible causes. Regarding extra body weight, we encourage patients to discuss diet and weight management plans with their primary care doctors. It is this team approach that gives you the best opportunity for pain relief and getting you back on your feet.      [place] has a Comprehensive Weight Management Program. This program includes counseling, education, non-surgical and surgical approaches to weight loss. If you are interested in learning more either talk to you primary care provider or call [phone].
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
## 2019-10-10 ENCOUNTER — HOSPITAL ENCOUNTER (OUTPATIENT)
Facility: CLINIC | Age: 74
End: 2019-10-10
Attending: PODIATRIST | Admitting: PODIATRIST
Payer: COMMERCIAL

## 2019-10-10 DIAGNOSIS — L84 CORN OF TOE: ICD-10-CM

## 2019-10-10 DIAGNOSIS — M20.41 HAMMER TOE OF RIGHT FOOT: ICD-10-CM

## 2019-10-10 DIAGNOSIS — M20.5X1 HALLUX LIMITUS OF RIGHT FOOT: ICD-10-CM

## 2019-10-10 NOTE — TELEPHONE ENCOUNTER
Patient called with interest to move forward with surgery.     Type of surgery: right 2nd hammertoe with arthroplasty and bone excision (CPT 09990, 25853)  Location of surgery: [facility]  Date and time of surgery: 12/12/19 @ 0730  Surgeon: Rufino Costa  Pre-Op Appt Date: patient will schedule  Post-Op Appt Date: 12/20/19   Packet sent out: Yes  Pre-cert/Authorization completed:  No  Date: 10/10/19      Ariadna Wilhelm Surgery Scheduler

## 2019-10-11 ENCOUNTER — TRANSFERRED RECORDS (OUTPATIENT)
Dept: HEALTH INFORMATION MANAGEMENT | Facility: CLINIC | Age: 74
End: 2019-10-11

## 2019-10-11 DIAGNOSIS — N40.1 BENIGN PROSTATIC HYPERPLASIA WITH INCOMPLETE BLADDER EMPTYING: ICD-10-CM

## 2019-10-11 DIAGNOSIS — R39.14 BENIGN PROSTATIC HYPERPLASIA WITH INCOMPLETE BLADDER EMPTYING: ICD-10-CM

## 2019-10-11 LAB
CREAT SERPL-MCNC: 0.93 MG/DL (ref 0.7–1.18)
GFR SERPL CREATININE-BSD FRML MDRD: 82 ML/MIN/1.73M2
TSH SERPL-ACNC: 0.3 UIU/ML (ref 0.3–5)

## 2019-10-11 RX ORDER — SILDENAFIL 100 MG/1
100 TABLET, FILM COATED ORAL DAILY PRN
Qty: 20 TABLET | Refills: 6 | COMMUNITY
Start: 2019-10-11 | End: 2023-01-09

## 2019-10-11 NOTE — TELEPHONE ENCOUNTER
Per Dr. Jerry Horvath's note from 6/4/2019 patient was notified that to take Viagra with his nitroglycerin.      Dimitri Abdi MA

## 2019-10-11 NOTE — TELEPHONE ENCOUNTER
sildenafil (VIAGRA) 100 MG   Last Written Prescription Date:  6/4/19  Last Fill Quantity: 20,   # refills: 6  Last Office Visit : 6/4/19  Future Office visit:  NONE    Routing refill request to provider for review/approval because:  Nitrates on medication list

## 2019-10-14 ENCOUNTER — TELEPHONE (OUTPATIENT)
Dept: FAMILY MEDICINE | Facility: CLINIC | Age: 74
End: 2019-10-14

## 2019-10-14 NOTE — TELEPHONE ENCOUNTER
Reason for Call:  Other appointment    Detailed comments: patient is scheduled for foot surgery on 12/12 with Dr Costa.  He needs a pre op and wants to schedule with Dr Dozier only.  She is currently booked out til January.     Phone Number Patient can be reached at: Home number on file [phone] (home)    Best Time: any    Can we leave a detailed message on this number? YES    Call taken on 10/14/2019 at 10:39 AM by Jud Ballesteros

## 2019-10-14 NOTE — TELEPHONE ENCOUNTER
Reason for Call:  Other prescription    Detailed comments: patient checking status of refill request for norco.     Phone Number Patient can be reached at: Home number on file [phone] (home)    Best Time: any    Can we leave a detailed message on this number? YES    Call taken on 10/14/2019 at 10:43 AM by Jud lainez

## 2019-10-15 RX ORDER — HYDROCODONE BITARTRATE AND ACETAMINOPHEN 5; 325 MG/1; MG/1
1 TABLET ORAL EVERY 4 HOURS PRN
Qty: 180 TABLET | Refills: 0 | Status: SHIPPED | OUTPATIENT
Start: 2019-10-15 | End: 2019-12-02

## 2019-10-15 NOTE — TELEPHONE ENCOUNTER
See prep below, pt originally requested 10 days ago, and looks like routing error.  Nora Irwin RN

## 2019-10-16 ENCOUNTER — TRANSFERRED RECORDS (OUTPATIENT)
Dept: HEALTH INFORMATION MANAGEMENT | Facility: CLINIC | Age: 74
End: 2019-10-16

## 2019-10-22 ENCOUNTER — DOCUMENTATION ONLY (OUTPATIENT)
Dept: SLEEP MEDICINE | Facility: CLINIC | Age: 74
End: 2019-10-22

## 2019-10-22 NOTE — PROGRESS NOTES
STM recheck     Diagnostic AHI: 19.9   PSG    Data only recheck     Assessment: Pt not meeting objective benchmarks for compliance       Action plan: STM removal, he has not returned STM visits      Device type: Auto-CPAP    PAP settings: CPAP min 5.0 cm  H20       CPAP max 15.0 cm  H20         Mask type:  Nasal Mask    Objective measures: 14 day rolling measures      Compliance  0 %             Objective measure goal  Compliance   Goal >70%  Leak   Goal < 24 lpm  AHI  Goal < 5  Usage  Goal >240      Total time spent on remote patient monitoring data analysis and patient contact today:   10 minutes

## 2019-10-28 DIAGNOSIS — J44.1 COPD EXACERBATION (H): ICD-10-CM

## 2019-10-28 NOTE — TELEPHONE ENCOUNTER
Requesting 90 day supply    Requested Prescriptions   Pending Prescriptions Disp Refills     levalbuterol (XOPENEX) 0.31 MG/3ML neb solution 720 mL 1     Sig: INHALE 1 VIAL (3ML) BY NEBULIZATION EVERY 4 HOURS AS NEEDED FOR WHEEZING OR SHORTNESS OF BREATH.       There is no refill protocol information for this order          Last Written Prescription Date:  11/08/17  Last Fill Quantity: 720 mL,  # refills: 1   Last office visit: 8/15/2019 with prescribing provider:  Tasia   Future Office Visit:   Next 5 appointments (look out 90 days)    Dec 02, 2019 10:30 AM CST  Pre-Op physical with Yaneli Dozier MD  [facility] ([facility]) [address] [place] [address]  [phone]   Dec 03, 2019 10:00 AM CST  Return Visit with Rufino Costa DPM  [facility] ([facility]) [address] [place] [phone]  [phone]   Dec 20, 2019 10:00 AM CST  Return Visit with Rufino Costa DPM  [facility] ([facility]) [address] [place] [address]  [phone]   Dec 31, 2019  1:00 PM CST  Return Visit with Niles Cedillo MD  [facility] ([facility]) [address] [place] [address]  [phone]           Routing refill request to provider for review/approval because:  Medication is reported/historical    Patient not taking: Reported on 10/1/2019

## 2019-10-30 RX ORDER — LEVALBUTEROL INHALATION SOLUTION 0.31 MG/3ML
SOLUTION RESPIRATORY (INHALATION)
Qty: 720 ML | Refills: 0 | Status: SHIPPED | OUTPATIENT
Start: 2019-10-30 | End: 2021-06-30

## 2019-10-30 NOTE — TELEPHONE ENCOUNTER
Please review/approve if ok. Pharmacy is requesting refill, but was reported not taking on 10/1/19 during rheum visit. Pt has upcoming appt with pcp.  Pended 1 refill with note.

## 2019-11-01 ENCOUNTER — TELEPHONE (OUTPATIENT)
Dept: FAMILY MEDICINE | Facility: CLINIC | Age: 74
End: 2019-11-01

## 2019-11-01 NOTE — TELEPHONE ENCOUNTER
Reason for Call:  Other prescription    Detailed comments: Patient is calling because he is takinh    levalbuterol (XOPENEX) 0.31 MG/3ML neb solution   He states that this is 2 years old now and is wondering if he is okay to take because his refill request hasn't been sent over yet.     Phone Number Patient can be reached at: Cell number on file:    Telephone Information:   Mobile [phone]       Best Time: anytime     Can we leave a detailed message on this number? YES    Call taken on 11/1/2019 at 10:40 AM by Destiny Sparrow

## 2019-11-01 NOTE — TELEPHONE ENCOUNTER
Call to patient instructed him NOT TO USE the  Xopenex and to throw it away. He has a refill of that medication that was ordered 10/30 at [facility] that should be ready for him. Patient did not know that Xopenex and Levalbuterol were the same medication.  Patient now understands.    Lore Farah RN

## 2019-11-25 DIAGNOSIS — J44.1 COPD EXACERBATION (H): ICD-10-CM

## 2019-11-25 DIAGNOSIS — M47.816 LUMBAR ARTHROPATHY: ICD-10-CM

## 2019-11-26 RX ORDER — CARISOPRODOL 250 MG/1
TABLET ORAL
Qty: 120 TABLET | Refills: 1 | Status: SHIPPED | OUTPATIENT
Start: 2019-11-26 | End: 2020-02-12

## 2019-11-26 RX ORDER — ALBUTEROL SULFATE 90 UG/1
AEROSOL, METERED RESPIRATORY (INHALATION)
Qty: 8.5 INHALER | Refills: 3 | Status: SHIPPED | OUTPATIENT
Start: 2019-11-26 | End: 2020-02-03

## 2019-11-26 NOTE — TELEPHONE ENCOUNTER
Requested Prescriptions   Pending Prescriptions Disp Refills     Carisoprodol 250 MG TABS [Pharmacy Med Name: CARISOPRODOL 250 MG TABLET] 120 tablet 1     Sig: TAKE 1 TAB (250 MG) BY MOUTH EVERY 4 HOURS AS NEEDED (FOR LOW BACK PAIN)        Last Written Prescription Date:  9/19/19  Last Fill Quantity: 120 tab,   # refills: 1  Last Office Visit: 10/08/2019  Future Office visit:    Next 5 appointments (look out 90 days)    Dec 02, 2019 10:30 AM CST  Pre-Op physical with Yaneli Dozier MD  [facility] ([facility]) [address] [place] [address]  [phone]   Dec 03, 2019 10:00 AM CST  Return Visit with Rufino Costa DPM  [facility] ([facility]) [address] [place] [address]  [phone]   Dec 20, 2019 10:00 AM CST  Return Visit with Rufino Costa DPM  [facility] ([facility]) [address] [place] [address]  [phone]   Dec 31, 2019  1:00 PM CST  Return Visit with Niles Cedillo MD  [facility] ([facility]) [address] [place] [phone]  [phone]   Feb 03, 2020 10:00 AM CST  Office Visit with Yaneli Dozier MD  [facility] ([facility]) [address] [place] [address]  [phone]           Routing refill request to provider for review/approval because:  Drug not on the [facility], [facility] or [facility] refill protocol or controlled substance         There is no refill protocol information for this order        albuterol (PROAIR HFA) 108 (90 Base) MCG/ACT inhaler [Pharmacy Med Name: PROAIR HFA 90 MCG INHALER] 8.5 Inhaler 0     Sig: INHALE 2 PUFFS BY MOUTH EVERY 6 HOURS AS NEEDED FOR WHEEZE OR FOR SHORTNESS OF BREATH  Last Written Prescription Date:  8/29/19  Last Fill Quantity: 8.5 inhaler,  # refills: 1   Last office visit: 8/15/2019 with prescribing provider:  Tasia Bennett Office Visit:   Next 5  "appointments (look out 90 days)    Dec 02, 2019 10:30 AM CST  Pre-Op physical with Yaneli Dozier MD  [facility] [address] [facility] [address]  [phone]   Dec 03, 2019 10:00 AM CST  Return Visit with Rufino Costa DPM  [facility] ([facility]) [address] [facility] [address]  [phone]   Dec 20, 2019 10:00 AM CST  Return Visit with Rufino Costa DPM  [facility] ([facility]) [address] [facility] [address]  [phone]   Dec 31, 2019  1:00 PM CST  Return Visit with Niles Cedillo MD  [facility] ([facility]) [address] [facility] [address]  [phone]   Feb 03, 2020 10:00 AM CST  Office Visit with Yaneli Dozier MD  [facility] ([facility]) [address] [facility] [address]  [phone]              Asthma Maintenance Inhalers - Anticholinergics Failed - 11/25/2019  3:15 PM        Failed - Asthma control assessment score within normal limits in last 6 months     Please review ACT score.   No flowsheet data found.          Passed - Patient is age 12 years or older        Passed - Medication is active on med list        Passed - Recent (6 mo) or future (30 days) visit within the authorizing provider's specialty     Patient had office visit in the last 6 months or has a visit in the next 30 days with authorizing provider or within the authorizing provider's specialty.  See \"Patient Info\" tab in inbasket, or \"Choose Columns\" in Meds & Orders section of the refill encounter.               "

## 2019-11-26 NOTE — TELEPHONE ENCOUNTER
Routing refill request to provider for review/approval because:  Drug not on the [facility] refill protocol   Patient reported not taking medication: carisoprodol    Filled per [facility] protocol-albuterol, patient has dx of COPD    VIRGINIA Hairston, RN  [employer] Triage

## 2019-12-02 ENCOUNTER — OFFICE VISIT (OUTPATIENT)
Dept: FAMILY MEDICINE | Facility: CLINIC | Age: 74
End: 2019-12-02
Payer: COMMERCIAL

## 2019-12-02 ENCOUNTER — TELEPHONE (OUTPATIENT)
Dept: FAMILY MEDICINE | Facility: CLINIC | Age: 74
End: 2019-12-02

## 2019-12-02 VITALS
WEIGHT: 183 LBS | DIASTOLIC BLOOD PRESSURE: 89 MMHG | OXYGEN SATURATION: 95 % | SYSTOLIC BLOOD PRESSURE: 168 MMHG | HEART RATE: 62 BPM | TEMPERATURE: 98.2 F | HEIGHT: 70 IN | BODY MASS INDEX: 26.2 KG/M2

## 2019-12-02 DIAGNOSIS — I10 BENIGN ESSENTIAL HYPERTENSION: ICD-10-CM

## 2019-12-02 DIAGNOSIS — J44.1 COPD EXACERBATION (H): ICD-10-CM

## 2019-12-02 DIAGNOSIS — M05.79 RHEUMATOID ARTHRITIS INVOLVING MULTIPLE SITES WITH POSITIVE RHEUMATOID FACTOR (H): ICD-10-CM

## 2019-12-02 DIAGNOSIS — E05.80 OTHER THYROTOXICOSIS WITHOUT THYROTOXIC CRISIS OR STORM: ICD-10-CM

## 2019-12-02 DIAGNOSIS — R49.0 HOARSE: ICD-10-CM

## 2019-12-02 DIAGNOSIS — M20.41 HAMMER TOE OF RIGHT FOOT: ICD-10-CM

## 2019-12-02 DIAGNOSIS — M48.062 SPINAL STENOSIS OF LUMBAR REGION WITH NEUROGENIC CLAUDICATION: ICD-10-CM

## 2019-12-02 DIAGNOSIS — Z01.818 PREOP GENERAL PHYSICAL EXAM: Primary | ICD-10-CM

## 2019-12-02 LAB
BASOPHILS # BLD AUTO: 0.1 10E9/L (ref 0–0.2)
BASOPHILS NFR BLD AUTO: 0.8 %
DIFFERENTIAL METHOD BLD: ABNORMAL
EOSINOPHIL # BLD AUTO: 0.8 10E9/L (ref 0–0.7)
EOSINOPHIL NFR BLD AUTO: 8.1 %
ERYTHROCYTE [DISTWIDTH] IN BLOOD BY AUTOMATED COUNT: 14.3 % (ref 10–15)
HCT VFR BLD AUTO: 46.3 % (ref 40–53)
HGB BLD-MCNC: 15.6 G/DL (ref 13.3–17.7)
LYMPHOCYTES # BLD AUTO: 1.6 10E9/L (ref 0.8–5.3)
LYMPHOCYTES NFR BLD AUTO: 16.2 %
MCH RBC QN AUTO: 32.3 PG (ref 26.5–33)
MCHC RBC AUTO-ENTMCNC: 33.7 G/DL (ref 31.5–36.5)
MCV RBC AUTO: 96 FL (ref 78–100)
MONOCYTES # BLD AUTO: 0.8 10E9/L (ref 0–1.3)
MONOCYTES NFR BLD AUTO: 8.4 %
NEUTROPHILS # BLD AUTO: 6.6 10E9/L (ref 1.6–8.3)
NEUTROPHILS NFR BLD AUTO: 66.5 %
PLATELET # BLD AUTO: 219 10E9/L (ref 150–450)
RBC # BLD AUTO: 4.83 10E12/L (ref 4.4–5.9)
WBC # BLD AUTO: 10 10E9/L (ref 4–11)

## 2019-12-02 PROCEDURE — 85025 COMPLETE CBC W/AUTO DIFF WBC: CPT | Performed by: INTERNAL MEDICINE

## 2019-12-02 PROCEDURE — 36415 COLL VENOUS BLD VENIPUNCTURE: CPT | Performed by: INTERNAL MEDICINE

## 2019-12-02 PROCEDURE — 99215 OFFICE O/P EST HI 40 MIN: CPT | Performed by: INTERNAL MEDICINE

## 2019-12-02 PROCEDURE — 80053 COMPREHEN METABOLIC PANEL: CPT | Performed by: INTERNAL MEDICINE

## 2019-12-02 RX ORDER — AMOXICILLIN 875 MG
875 TABLET ORAL 2 TIMES DAILY
Qty: 14 TABLET | Refills: 0 | Status: SHIPPED | OUTPATIENT
Start: 2019-12-02 | End: 2019-12-10

## 2019-12-02 RX ORDER — HYDROCODONE BITARTRATE AND ACETAMINOPHEN 5; 325 MG/1; MG/1
1 TABLET ORAL EVERY 6 HOURS PRN
Qty: 180 TABLET | Refills: 0 | Status: SHIPPED | OUTPATIENT
Start: 2019-12-02 | End: 2020-02-03

## 2019-12-02 RX ORDER — PREDNISONE 20 MG/1
40 TABLET ORAL DAILY
Qty: 5 TABLET | Refills: 0 | Status: SHIPPED | OUTPATIENT
Start: 2019-12-02 | End: 2019-12-10

## 2019-12-02 RX ORDER — AMLODIPINE BESYLATE 5 MG/1
5 TABLET ORAL DAILY
Qty: 90 TABLET | Refills: 3 | Status: SHIPPED | OUTPATIENT
Start: 2019-12-02 | End: 2019-12-10

## 2019-12-02 ASSESSMENT — MIFFLIN-ST. JEOR: SCORE: 1586.33
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
## 2019-12-02 NOTE — TELEPHONE ENCOUNTER
PCP,    Pharmacy called with two questions     1) amoxicillin (AMOXIL) 875 MG tablet [09049] (Order [id number])   Has interaction with Methotrexate and pharmacy wants to know if it is still ok to give drug     2) predniSONE (DELTASONE) 20 MG tablet [[id number]] (Order [id number])   Rx is for 5 pills but the instructions are 2 pills daily for 5 days (should this be 10 pills total)    Please advise    Thank you,  Chino SHABAZZ RN

## 2019-12-02 NOTE — TELEPHONE ENCOUNTER
Reason for Call:  Medication or medication refill:    Do you use a [facility]?  Name of the pharmacy and phone number for the current request:     [id number] - [place], [place] - [address] [place] [address]  WRITTEN PRESCRIPTION REQUESTED    Name of the medication requested: N/A    Other request:   Pharmacy called with two questions    amoxicillin (AMOXIL) 875 MG tablet [[id number]] (Order [id number])   Has interaction with Methotrexate and pharmacy wants to know if it is still ok to give drug    predniSONE (DELTASONE) 20 MG tablet [[id number]] (Order [id number])   Rx is for 5 pills but the instructions are 2 pills daily for 5 days (10 pills total)    Can we leave a detailed message on this number?   yes  Phone number patient can be reached at: [phone]    Best Time: any    Call taken on 12/2/2019 at 12:14 PM by Pedrito Ayala

## 2019-12-02 NOTE — TELEPHONE ENCOUNTER
"Requested Prescriptions   Pending Prescriptions Disp Refills     INCRUSE ELLIPTA 62.5 MCG/INH Inhaler [Pharmacy Med Name: INCRUSE ELLIPTA 62.5 MCG INH]  1     Sig: INHALE 1 PUFF BY MOUTH EVERY DAY   Last Written Prescription Date:  9/4/2019  Last Fill Quantity: 30,  # refills: 1   Last office visit: 12/2/2019 with prescribing provider:  Tasia   Future Office Visit:   Next 5 appointments (look out 90 days)    Dec 09, 2019 10:30 AM CST  [facility]urse [facility] [address] [facility] [address]  [phone]   Dec 20, 2019 10:00 AM CST  Return Visit with Rufino Costa DPM  [facility] ([place] [id number] [facility] [address]  [phone]   Dec 31, 2019  1:00 PM CST  Return Visit with Niles Cedillo MD  [facility] ([facility]) [address] [place] [address]  [phone]   Jan 06, 2020 12:30 PM CST  [facility] Visit with [facility]eli Dozier MD  [facility] [address] [facility] [address]  [phone]   Feb 03, 2020 10:00 AM CST  Office Visit with Yaneli Dozier MD  [facility] [address] [facility] [address]  [phone]             Asthma Maintenance Inhalers - Anticholinergics Failed - 12/2/2019 12:18 PM        Failed - Asthma control assessment score within normal limits in last 6 months     Please review ACT score.           Passed - Patient is age 12 years or older        Passed - Medication is active on med list        Passed - Recent (6 mo) or future (30 days) visit within the authorizing provider's specialty     Patient had office visit in the last 6 months or has a visit in the next 30 days with authorizing provider or within the authorizing provider's specialty.  See \"Patient Info\" tab in inbasket, or \"Choose Columns\" in Meds & Orders section of the refill encounter.  "           No flowsheet data found.  ACT not required for COPD    Filled per [facility] protocol.     JUN HairstonN, RN  [employer] Triage

## 2019-12-03 ENCOUNTER — TELEPHONE (OUTPATIENT)
Dept: PODIATRY | Facility: CLINIC | Age: 74
End: 2019-12-03

## 2019-12-03 LAB
ALBUMIN SERPL-MCNC: 3.7 G/DL (ref 3.4–5)
ALP SERPL-CCNC: 100 U/L (ref 40–150)
ALT SERPL W P-5'-P-CCNC: 31 U/L (ref 0–70)
ANION GAP SERPL CALCULATED.3IONS-SCNC: 6 MMOL/L (ref 3–14)
AST SERPL W P-5'-P-CCNC: 20 U/L (ref 0–45)
BILIRUB SERPL-MCNC: 0.8 MG/DL (ref 0.2–1.3)
BUN SERPL-MCNC: 15 MG/DL (ref 7–30)
CALCIUM SERPL-MCNC: 9.1 MG/DL (ref 8.5–10.1)
CHLORIDE SERPL-SCNC: 103 MMOL/L (ref 94–109)
CO2 SERPL-SCNC: 29 MMOL/L (ref 20–32)
CREAT SERPL-MCNC: 0.88 MG/DL (ref 0.66–1.25)
GFR SERPL CREATININE-BSD FRML MDRD: 86 ML/MIN/{1.73_M2}
GLUCOSE SERPL-MCNC: 91 MG/DL (ref 70–99)
POTASSIUM SERPL-SCNC: 4.2 MMOL/L (ref 3.4–5.3)
PROT SERPL-MCNC: 7 G/DL (ref 6.8–8.8)
SODIUM SERPL-SCNC: 138 MMOL/L (ref 133–144)

## 2019-12-03 NOTE — TELEPHONE ENCOUNTER
Patient cancelled surgery for hammertoe surgery; DOS 12/12/19 . Failed pre-op due to a cold, exacerbated COPD and elevated BP.         Ariadna Wilhelm, Surgery Scheduler

## 2019-12-09 ENCOUNTER — ALLIED HEALTH/NURSE VISIT (OUTPATIENT)
Dept: NURSING | Facility: CLINIC | Age: 74
End: 2019-12-09
Payer: COMMERCIAL

## 2019-12-09 VITALS
WEIGHT: 187 LBS | HEART RATE: 77 BPM | TEMPERATURE: 98 F | SYSTOLIC BLOOD PRESSURE: 157 MMHG | HEIGHT: 70 IN | BODY MASS INDEX: 26.77 KG/M2 | DIASTOLIC BLOOD PRESSURE: 88 MMHG | OXYGEN SATURATION: 92 %

## 2019-12-09 DIAGNOSIS — Z01.30 BP CHECK: Primary | ICD-10-CM

## 2019-12-09 PROCEDURE — 99207 ZZC NO CHARGE NURSE ONLY: CPT

## 2019-12-09 ASSESSMENT — MIFFLIN-ST. JEOR: SCORE: 1604.48

## 2019-12-09 NOTE — Clinical Note
BP: 157/88 Pulse: 77 Scheduled an appointment to see  tomorrow 12/10/2019 8:30 AM at [facility]

## 2019-12-09 NOTE — PROGRESS NOTES
"    Hypertension Follow-up      Are your blood pressures ever more than 140 on the top number (systolic) OR more   than 90 on the bottom number (diastolic), for example 140/90? Yes, 170/88, 167/86, 185/92, 199/104    Harrison Thomas is a 74 year old patient who comes in today for a Blood Pressure check.  Initial BP:  BP (!) 157/88 (BP Location: Left arm, Patient Position: Sitting, Cuff Size: Adult Large)   Pulse 77   Temp 98  F (36.7  C) (Oral)   Ht 1.778 m (5' 10\")   Wt 84.8 kg (187 lb)   SpO2 92%   BMI 26.83 kg/m       77  Disposition: results routed to provider and scheduled an appointment to see  tomorrow 12/10/2019 8:30 AM at [place] location    "

## 2019-12-10 ENCOUNTER — OFFICE VISIT (OUTPATIENT)
Dept: FAMILY MEDICINE | Facility: CLINIC | Age: 74
End: 2019-12-10
Payer: COMMERCIAL

## 2019-12-10 VITALS
HEART RATE: 65 BPM | TEMPERATURE: 98.1 F | RESPIRATION RATE: 14 BRPM | BODY MASS INDEX: 26.51 KG/M2 | WEIGHT: 185.19 LBS | SYSTOLIC BLOOD PRESSURE: 159 MMHG | HEIGHT: 70 IN | DIASTOLIC BLOOD PRESSURE: 69 MMHG | OXYGEN SATURATION: 95 %

## 2019-12-10 DIAGNOSIS — I10 BENIGN ESSENTIAL HYPERTENSION: Primary | ICD-10-CM

## 2019-12-10 DIAGNOSIS — J44.1 COPD EXACERBATION (H): ICD-10-CM

## 2019-12-10 PROCEDURE — 99214 OFFICE O/P EST MOD 30 MIN: CPT | Performed by: INTERNAL MEDICINE

## 2019-12-10 ASSESSMENT — PAIN SCALES - GENERAL: PAINLEVEL: NO PAIN (0)

## 2019-12-10 ASSESSMENT — MIFFLIN-ST. JEOR: SCORE: 1596.25
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
## 2019-12-10 NOTE — NURSING NOTE
"Chief Complaint   Patient presents with     Hypertension     BP (!) 159/69 (BP Location: Left arm, Patient Position: Sitting, Cuff Size: Adult Regular)   Pulse 65   Temp 98.1  F (36.7  C) (Oral)   Resp 14   Ht 1.778 m (5' 10\")   Wt 84 kg (185 lb 3 oz)   SpO2 95%   BMI 26.57 kg/m   Estimated body mass index is 26.57 kg/m  as calculated from the following:    Height as of this encounter: 1.778 m (5' 10\").    Weight as of this encounter: 84 kg (185 lb 3 oz).  bp completed using cuff size: regular      Health Maintenance addressed:  NONE    N/a  Patria Izquierdo CMA on 12/10/2019 at 8:26 AM                "

## 2019-12-10 NOTE — PATIENT INSTRUCTIONS
Increase lasix to 40 mg daily for 2 days,    Then, back to 20 mg daily   Limit salt      Increase Norvasc to 5 mg twice daily     Repeat blood pressure with nurse    General Guidelines for Cutting Down on Salt  Eliminate salty foods from your diet and reduce the amount of salt used in cooking. Sea salt is no better than regular salt.   Choose low sodium foods. Many salt-free or reduced salt products are available. When reading food labels, low sodium is defined as 140 mg of sodium per serving.   Salt substitutes are sometimes made from potassium, so read the label. If you are on a low potassium diet, then check with your doctor before using those salt substitutes.   Be creative and season your foods with spices, herbs, lemon, garlic, michi, vinegar and pepper. Remove the salt shaker from the table.   Read ingredient labels to identify foods high in sodium. Items with 400 mg or more of sodium are high in sodium. High sodium food additives include salt, brine, or other items that say sodium, such as monosodium glutamate.   Eat more home-cooked meals. Foods cooked from scratch are naturally lower in sodium than most instant and boxed mixes.   Don t use softened water for cooking and drinking since it contains added salt.   Avoid medications which contain sodium such as Richa [place] and Bromo [place].   For more information; food composition books are available which tell how much sodium is in food. Online sources such as www.[employer] also list amounts.   '  Patient Education     Low-Salt Diet  This diet removes foods that are high in salt. It also limits the amount of salt you use when cooking. It is most often used for people with high blood pressure, edema (fluid retention), and kidney, liver, or heart disease.  Table salt contains the mineral sodium. Your body needs sodium to work normally. But too much sodium can make your health problems worse. Your healthcare provider is recommending a low-salt (also  called low-sodium) diet for you. Your total daily allowance of salt is 1,500 to 2,300 milligrams (mg). It is less than 1 teaspoon of table salt. This means you can have only about 500 to 700 mg of sodium at each meal. People with certain health problems should limit salt intake to the lower end of the recommended range.    When you cook, don t add much salt. If you can cook without using salt, even better. Don t add salt to your food at the table.  When shopping, read food labels. Salt is often called sodium on the label. Choose foods that are salt-free, low salt, or very low salt. Note that foods with reduced salt may not lower your salt intake enough.    Beans, potatoes, and pasta  Ok: Dry beans, split peas, lentils, potatoes, rice, macaroni, pasta, spaghetti without added salt  Avoid: Potato chips, tortilla chips, and similar products  Breads and cereals  Ok: Low-sodium breads, rolls, cereals, and cakes; low-salt crackers, matzo crackers  Avoid: Salted crackers, pretzels, popcorn, Japanese toast, pancakes, muffins  Dairy  Ok: Milk, chocolate milk, hot chocolate mix, low-salt cheeses, and yogurt  Avoid: Processed cheese and cheese spreads; Roquefort, Camembert, and cottage cheese; buttermilk, instant breakfast drink  Desserts  Ok: Ice cream, frozen yogurt, juice bars, gelatin, cookies and pies, sugar, honey, jelly, hard candy  Avoid: Most pies, cakes and cookies prepared or processed with salt; instant pudding  Drinks  Ok: Tea, coffee, fizzy (carbonated) drinks, juices  Avoid: Flavored coffees, electrolyte replacement drinks, sports drinks  Meats  Ok: All fresh meat, fish, poultry, low-salt tuna, eggs, egg substitute  Avoid: Smoked, pickled, brine-cured, or salted meats and fish. This includes trejo, chipped beef, corned beef, hot dogs, deli meats, ham, kosher meats, salt pork, sausage, canned tuna, salted codfish, smoked salmon, herring, sardines, or anchovies.  Seasonings and spices  Ok: Most seasonings are okay.  Good substitutes for salt include: fresh herb blends, hot sauce, lemon, garlic, naranjo, vinegar, dry mustard, parsley, cilantro, horseradish, tomato paste, regular margarine, mayonnaise, unsalted butter, cream cheese, vegetable oil, cream, low-salt salad dressing and gravy.  Avoid: Regular ketchup, relishes, pickles, soy sauce, teriyaki sauce, Worcestershire sauce, BBQ sauce, tartar sauce, meat tenderizer, chili sauce, regular gravy, regular salad dressing, salted butter  Soups  Ok: Low-salt soups and broths made with allowed foods  Avoid: Bouillon cubes, soups with smoked or salted meats, regular soup and broth  Vegetables  Ok: Most vegetables are okay; also low-salt tomato and vegetable juices  Avoid: Sauerkraut and other brine-soaked vegetables; pickles and other pickled vegetables; tomato juice, olives  Date Last Reviewed: 8/1/2016 2000-2018 [employer]. [address] [place] [id number]. All rights reserved. This information is not intended as a substitute for professional medical care. Always follow your healthcare professional's instructions.

## 2019-12-12 RX ORDER — AMLODIPINE BESYLATE 5 MG/1
5 TABLET ORAL 2 TIMES DAILY
Qty: 90 TABLET | Refills: 3 | Status: SHIPPED | OUTPATIENT
Start: 2019-12-12 | End: 2020-11-05

## 2019-12-15 ENCOUNTER — DOCUMENTATION ONLY (OUTPATIENT)
Dept: RHEUMATOLOGY | Facility: CLINIC | Age: 74
End: 2019-12-15

## 2019-12-15 ENCOUNTER — HEALTH MAINTENANCE LETTER (OUTPATIENT)
Age: 74
End: 2019-12-15

## 2019-12-15 DIAGNOSIS — Z79.899 HIGH RISK MEDICATION USE: Primary | ICD-10-CM

## 2019-12-16 ENCOUNTER — ALLIED HEALTH/NURSE VISIT (OUTPATIENT)
Dept: NURSING | Facility: CLINIC | Age: 74
End: 2019-12-16
Payer: COMMERCIAL

## 2019-12-16 VITALS
WEIGHT: 186 LBS | HEIGHT: 70 IN | SYSTOLIC BLOOD PRESSURE: 150 MMHG | OXYGEN SATURATION: 94 % | TEMPERATURE: 98 F | HEART RATE: 62 BPM | DIASTOLIC BLOOD PRESSURE: 79 MMHG | BODY MASS INDEX: 26.63 KG/M2

## 2019-12-16 DIAGNOSIS — I10 BENIGN ESSENTIAL HYPERTENSION: ICD-10-CM

## 2019-12-16 PROCEDURE — 99207 ZZC NO CHARGE NURSE ONLY: CPT

## 2019-12-16 RX ORDER — METOPROLOL SUCCINATE 50 MG/1
50 TABLET, EXTENDED RELEASE ORAL DAILY
Qty: 90 TABLET | Refills: 3 | Status: SHIPPED | OUTPATIENT
Start: 2019-12-16 | End: 2020-11-05

## 2019-12-16 RX ORDER — METOPROLOL SUCCINATE 25 MG/1
25 TABLET, EXTENDED RELEASE ORAL DAILY
Qty: 90 TABLET | Refills: 3 | Status: SHIPPED | OUTPATIENT
Start: 2019-12-16 | End: 2020-11-05

## 2019-12-16 ASSESSMENT — MIFFLIN-ST. JEOR: SCORE: 1599.94

## 2019-12-16 NOTE — PROGRESS NOTES
"Harrison Thomas is a 74 year old patient who comes in today for a Blood Pressure check.  Initial BP:  BP (!) 150/79 (BP Location: Left arm, Patient Position: Sitting, Cuff Size: Adult Large)   Pulse 62   Temp 98  F (36.7  C) (Oral)   Ht 1.778 m (5' 10\")   Wt 84.4 kg (186 lb)   SpO2 94%   BMI 26.69 kg/m       62  Disposition: Increase metoprolol succinate ER (TOPROL-XL) 75 MG daily     (take with 50mg tablet for total 75mg daily)    Izabella Gipson CMA    "

## 2019-12-20 ENCOUNTER — ALLIED HEALTH/NURSE VISIT (OUTPATIENT)
Dept: NURSING | Facility: CLINIC | Age: 74
End: 2019-12-20
Payer: COMMERCIAL

## 2019-12-20 VITALS
BODY MASS INDEX: 26.63 KG/M2 | HEIGHT: 70 IN | WEIGHT: 186 LBS | DIASTOLIC BLOOD PRESSURE: 78 MMHG | SYSTOLIC BLOOD PRESSURE: 154 MMHG

## 2019-12-20 DIAGNOSIS — I10 ESSENTIAL HYPERTENSION, BENIGN: Primary | ICD-10-CM

## 2019-12-20 PROCEDURE — 99207 ZZC NO CHARGE NURSE ONLY: CPT

## 2019-12-20 ASSESSMENT — MIFFLIN-ST. JEOR: SCORE: 1599.94

## 2019-12-20 NOTE — NURSING NOTE
"Harrison Thomas is a 74 year old patient who comes in today for a Blood Pressure check.  Initial BP:  BP (!) 154/78 (BP Location: Left arm, Patient Position: Sitting, Cuff Size: Adult Regular)   Ht 1.778 m (5' 10\")   Wt 84.4 kg (186 lb)   BMI 26.69 kg/m       Data Unavailable  Disposition: results routed to provider and BP elevated.  Triage RN notified, patient asked to wait  Pascual Cobb CMA on 12/20/2019 at 10:37 AM    "

## 2019-12-20 NOTE — Clinical Note
"Harrison Thomas is a 74 year old patient who comes in today for a Blood Pressure check.Initial BP:  BP (!) 154/78 (BP Location: Left arm, Patient Position: Sitting, Cuff Size: Adult Regular)   Ht 1.778 m (5' 10\")   Wt 84.4 kg (186 lb)   BMI 26.69 kg/m     Data UnavailableDisposition: BP elevated.  Triage RN notified, patient asked to wait and was recommended per Niurka RN, to monitor and record BP readings and to report back to us within a week. Routing message to provider for further review and recommendations. Salvador Cobb CMA on 12/20/2019 at 10:36 AM"

## 2019-12-23 DIAGNOSIS — Z79.899 HIGH RISK MEDICATION USE: ICD-10-CM

## 2019-12-23 LAB
ALBUMIN SERPL-MCNC: 3.5 G/DL (ref 3.4–5)
ALP SERPL-CCNC: 86 U/L (ref 40–150)
ALT SERPL W P-5'-P-CCNC: 34 U/L (ref 0–70)
AST SERPL W P-5'-P-CCNC: 23 U/L (ref 0–45)
BASOPHILS # BLD AUTO: 0.1 10E9/L (ref 0–0.2)
BASOPHILS NFR BLD AUTO: 0.8 %
BILIRUB DIRECT SERPL-MCNC: 0.2 MG/DL (ref 0–0.2)
BILIRUB SERPL-MCNC: 1 MG/DL (ref 0.2–1.3)
CREAT SERPL-MCNC: 0.83 MG/DL (ref 0.66–1.25)
CRP SERPL-MCNC: 7 MG/L (ref 0–8)
DIFFERENTIAL METHOD BLD: NORMAL
EOSINOPHIL # BLD AUTO: 0.6 10E9/L (ref 0–0.7)
EOSINOPHIL NFR BLD AUTO: 6.6 %
ERYTHROCYTE [DISTWIDTH] IN BLOOD BY AUTOMATED COUNT: 14.2 % (ref 10–15)
ERYTHROCYTE [SEDIMENTATION RATE] IN BLOOD BY WESTERGREN METHOD: 8 MM/H (ref 0–20)
GFR SERPL CREATININE-BSD FRML MDRD: 88 ML/MIN/{1.73_M2}
HCT VFR BLD AUTO: 45.2 % (ref 40–53)
HGB BLD-MCNC: 15.2 G/DL (ref 13.3–17.7)
LYMPHOCYTES # BLD AUTO: 1.7 10E9/L (ref 0.8–5.3)
LYMPHOCYTES NFR BLD AUTO: 19.9 %
MCH RBC QN AUTO: 32.5 PG (ref 26.5–33)
MCHC RBC AUTO-ENTMCNC: 33.6 G/DL (ref 31.5–36.5)
MCV RBC AUTO: 97 FL (ref 78–100)
MONOCYTES # BLD AUTO: 0.9 10E9/L (ref 0–1.3)
MONOCYTES NFR BLD AUTO: 10.5 %
NEUTROPHILS # BLD AUTO: 5.3 10E9/L (ref 1.6–8.3)
NEUTROPHILS NFR BLD AUTO: 62.2 %
PLATELET # BLD AUTO: 172 10E9/L (ref 150–450)
PROT SERPL-MCNC: 6.6 G/DL (ref 6.8–8.8)
RBC # BLD AUTO: 4.67 10E12/L (ref 4.4–5.9)
WBC # BLD AUTO: 8.5 10E9/L (ref 4–11)

## 2019-12-23 PROCEDURE — 86140 C-REACTIVE PROTEIN: CPT | Performed by: INTERNAL MEDICINE

## 2019-12-23 PROCEDURE — 85025 COMPLETE CBC W/AUTO DIFF WBC: CPT | Performed by: INTERNAL MEDICINE

## 2019-12-23 PROCEDURE — 80076 HEPATIC FUNCTION PANEL: CPT | Performed by: INTERNAL MEDICINE

## 2019-12-23 PROCEDURE — 85652 RBC SED RATE AUTOMATED: CPT | Performed by: INTERNAL MEDICINE

## 2019-12-23 PROCEDURE — 82565 ASSAY OF CREATININE: CPT | Performed by: INTERNAL MEDICINE

## 2019-12-23 PROCEDURE — 36415 COLL VENOUS BLD VENIPUNCTURE: CPT | Performed by: INTERNAL MEDICINE

## 2019-12-31 ENCOUNTER — OFFICE VISIT (OUTPATIENT)
Dept: RHEUMATOLOGY | Facility: CLINIC | Age: 74
End: 2019-12-31
Payer: COMMERCIAL

## 2019-12-31 VITALS
OXYGEN SATURATION: 90 % | DIASTOLIC BLOOD PRESSURE: 54 MMHG | WEIGHT: 187.2 LBS | HEART RATE: 53 BPM | HEIGHT: 70 IN | BODY MASS INDEX: 26.8 KG/M2 | SYSTOLIC BLOOD PRESSURE: 120 MMHG

## 2019-12-31 DIAGNOSIS — M05.79 RHEUMATOID ARTHRITIS INVOLVING MULTIPLE SITES WITH POSITIVE RHEUMATOID FACTOR (H): Primary | ICD-10-CM

## 2019-12-31 DIAGNOSIS — M85.80 OSTEOPENIA, UNSPECIFIED LOCATION: ICD-10-CM

## 2019-12-31 DIAGNOSIS — Z79.899 HIGH RISK MEDICATIONS (NOT ANTICOAGULANTS) LONG-TERM USE: ICD-10-CM

## 2019-12-31 PROCEDURE — 99214 OFFICE O/P EST MOD 30 MIN: CPT | Performed by: INTERNAL MEDICINE

## 2019-12-31 RX ORDER — ALENDRONATE SODIUM 70 MG/1
70 TABLET ORAL
Qty: 12 TABLET | Refills: 3 | Status: SHIPPED | OUTPATIENT
Start: 2019-12-31 | End: 2020-12-21

## 2019-12-31 ASSESSMENT — MIFFLIN-ST. JEOR: SCORE: 1605.38

## 2019-12-31 NOTE — NURSING NOTE
RAPID3 (0-30) Cumulative Score  13.3          RAPID3 Weighted Score (divide #4 by 3 and that is the weighted score)  4.3
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
## 2019-12-31 NOTE — PATIENT INSTRUCTIONS
Rheumatology    Dr. Nilse Cedillo       [facility] [place]   (Monday)  [address]  [place] [address]       [facility] [place]   (Tuesday)  [address] [place] [address]    [facility] in [place]   (Wed., Thurs., and Friday)  [address] [place] [address]    Phone number: [phone]  Thank you for choosing [facility].  Kinjal Quinn CMA

## 2020-01-08 DIAGNOSIS — J44.1 COPD EXACERBATION (H): ICD-10-CM

## 2020-01-08 RX ORDER — BUDESONIDE AND FORMOTEROL FUMARATE DIHYDRATE 160; 4.5 UG/1; UG/1
AEROSOL RESPIRATORY (INHALATION)
Qty: 30.6 INHALER | Refills: 0 | Status: SHIPPED | OUTPATIENT
Start: 2020-01-08 | End: 2020-02-03

## 2020-01-08 NOTE — TELEPHONE ENCOUNTER
"SYMBICORT 160-4.5 MCG/ACT Inhaler     Last Written Prescription Date:  7/22/2019  Last Fill Quantity: 30.6 inhaler,  # refills: 1   Last office visit: 12/10/2019 with prescribing provider:  Dr. Dozier    Future Office Visit:   Next 5 appointments (look out 90 days)    Feb 03, 2020 10:00 AM CST  Office Visit with Yaneli Dozier MD  [facility] ([facility] [address] [facility] [address] [phone]         Requested Prescriptions   Pending Prescriptions Disp Refills     SYMBICORT 160-4.5 MCG/ACT Inhaler [Pharmacy Med Name: SYMBICORT 160-4.5 MCG INHALER] 30.6 Inhaler 1     Sig: INHALE 2 PUFFS INTO THE LUNGS 2 TIMES DAILY       Inhaled Steroids Protocol Failed - 1/8/2020  2:54 AM        Failed - Asthma control assessment score within normal limits in last 6 months     Please review ACT score.           Passed - Patient is age 12 or older        Passed - Medication is active on med list        Passed - Recent (6 mo) or future (30 days) visit within the authorizing provider's specialty     Patient had office visit in the last 6 months or has a visit in the next 30 days with authorizing provider or within the authorizing provider's specialty.  See \"Patient Info\" tab in inbasket, or \"Choose Columns\" in Meds & Orders section of the refill encounter.              "

## 2020-02-03 ENCOUNTER — OFFICE VISIT (OUTPATIENT)
Dept: FAMILY MEDICINE | Facility: CLINIC | Age: 75
End: 2020-02-03
Payer: COMMERCIAL

## 2020-02-03 VITALS
DIASTOLIC BLOOD PRESSURE: 75 MMHG | OXYGEN SATURATION: 92 % | TEMPERATURE: 98.2 F | SYSTOLIC BLOOD PRESSURE: 129 MMHG | HEIGHT: 69 IN | BODY MASS INDEX: 27.55 KG/M2 | HEART RATE: 63 BPM | WEIGHT: 186 LBS

## 2020-02-03 DIAGNOSIS — M05.79 RHEUMATOID ARTHRITIS INVOLVING MULTIPLE SITES WITH POSITIVE RHEUMATOID FACTOR (H): ICD-10-CM

## 2020-02-03 DIAGNOSIS — D84.9 IMMUNODEFICIENCY (H): ICD-10-CM

## 2020-02-03 DIAGNOSIS — M48.062 SPINAL STENOSIS OF LUMBAR REGION WITH NEUROGENIC CLAUDICATION: ICD-10-CM

## 2020-02-03 DIAGNOSIS — E78.5 HYPERLIPIDEMIA LDL GOAL <100: ICD-10-CM

## 2020-02-03 DIAGNOSIS — J44.1 CHRONIC OBSTRUCTIVE PULMONARY DISEASE WITH ACUTE EXACERBATION (H): Primary | ICD-10-CM

## 2020-02-03 DIAGNOSIS — E05.80 OTHER THYROTOXICOSIS WITHOUT THYROTOXIC CRISIS OR STORM: ICD-10-CM

## 2020-02-03 DIAGNOSIS — M85.80 OSTEOPENIA, UNSPECIFIED LOCATION: ICD-10-CM

## 2020-02-03 DIAGNOSIS — I10 BENIGN ESSENTIAL HYPERTENSION: ICD-10-CM

## 2020-02-03 LAB
ALBUMIN SERPL-MCNC: 3.2 G/DL (ref 3.4–5)
ALP SERPL-CCNC: 85 U/L (ref 40–150)
ALT SERPL W P-5'-P-CCNC: 25 U/L (ref 0–70)
AMPHETAMINES UR QL: NOT DETECTED NG/ML
AST SERPL W P-5'-P-CCNC: 13 U/L (ref 0–45)
BARBITURATES UR QL SCN: NOT DETECTED NG/ML
BASOPHILS # BLD AUTO: 0.1 10E9/L (ref 0–0.2)
BASOPHILS NFR BLD AUTO: 0.6 %
BENZODIAZ UR QL SCN: NOT DETECTED NG/ML
BILIRUB DIRECT SERPL-MCNC: 0.2 MG/DL (ref 0–0.2)
BILIRUB SERPL-MCNC: 0.8 MG/DL (ref 0.2–1.3)
BUPRENORPHINE UR QL: NOT DETECTED NG/ML
CANNABINOIDS UR QL: NOT DETECTED NG/ML
COCAINE UR QL SCN: NOT DETECTED NG/ML
CRP SERPL-MCNC: 29 MG/L (ref 0–8)
D-METHAMPHET UR QL: NOT DETECTED NG/ML
DIFFERENTIAL METHOD BLD: ABNORMAL
EOSINOPHIL # BLD AUTO: 0.5 10E9/L (ref 0–0.7)
EOSINOPHIL NFR BLD AUTO: 4.5 %
ERYTHROCYTE [DISTWIDTH] IN BLOOD BY AUTOMATED COUNT: 14.6 % (ref 10–15)
ERYTHROCYTE [SEDIMENTATION RATE] IN BLOOD BY WESTERGREN METHOD: 12 MM/H (ref 0–20)
HCT VFR BLD AUTO: 43.9 % (ref 40–53)
HGB BLD-MCNC: 14.6 G/DL (ref 13.3–17.7)
LYMPHOCYTES # BLD AUTO: 1.8 10E9/L (ref 0.8–5.3)
LYMPHOCYTES NFR BLD AUTO: 17.1 %
MCH RBC QN AUTO: 31.7 PG (ref 26.5–33)
MCHC RBC AUTO-ENTMCNC: 33.3 G/DL (ref 31.5–36.5)
MCV RBC AUTO: 95 FL (ref 78–100)
METHADONE UR QL SCN: NOT DETECTED NG/ML
MONOCYTES # BLD AUTO: 1.4 10E9/L (ref 0–1.3)
MONOCYTES NFR BLD AUTO: 13.3 %
NEUTROPHILS # BLD AUTO: 6.7 10E9/L (ref 1.6–8.3)
NEUTROPHILS NFR BLD AUTO: 64.5 %
OPIATES UR QL SCN: NOT DETECTED NG/ML
OXYCODONE UR QL SCN: NOT DETECTED NG/ML
PCP UR QL SCN: NOT DETECTED NG/ML
PLATELET # BLD AUTO: 199 10E9/L (ref 150–450)
PROPOXYPH UR QL: NOT DETECTED NG/ML
PROT SERPL-MCNC: 6.7 G/DL (ref 6.8–8.8)
RBC # BLD AUTO: 4.6 10E12/L (ref 4.4–5.9)
TRICYCLICS UR QL SCN: NOT DETECTED NG/ML
TSH SERPL DL<=0.005 MIU/L-ACNC: 0.4 MU/L (ref 0.4–4)
WBC # BLD AUTO: 10.4 10E9/L (ref 4–11)

## 2020-02-03 PROCEDURE — 36415 COLL VENOUS BLD VENIPUNCTURE: CPT | Performed by: INTERNAL MEDICINE

## 2020-02-03 PROCEDURE — 85025 COMPLETE CBC W/AUTO DIFF WBC: CPT | Performed by: INTERNAL MEDICINE

## 2020-02-03 PROCEDURE — 85652 RBC SED RATE AUTOMATED: CPT | Performed by: INTERNAL MEDICINE

## 2020-02-03 PROCEDURE — 80306 DRUG TEST PRSMV INSTRMNT: CPT | Performed by: INTERNAL MEDICINE

## 2020-02-03 PROCEDURE — 80076 HEPATIC FUNCTION PANEL: CPT | Performed by: INTERNAL MEDICINE

## 2020-02-03 PROCEDURE — 82306 VITAMIN D 25 HYDROXY: CPT | Performed by: INTERNAL MEDICINE

## 2020-02-03 PROCEDURE — 99214 OFFICE O/P EST MOD 30 MIN: CPT | Performed by: INTERNAL MEDICINE

## 2020-02-03 PROCEDURE — 86140 C-REACTIVE PROTEIN: CPT | Performed by: INTERNAL MEDICINE

## 2020-02-03 PROCEDURE — 84443 ASSAY THYROID STIM HORMONE: CPT | Performed by: INTERNAL MEDICINE

## 2020-02-03 RX ORDER — HYDROCODONE BITARTRATE AND ACETAMINOPHEN 5; 325 MG/1; MG/1
1 TABLET ORAL 3 TIMES DAILY PRN
Qty: 90 TABLET | Refills: 0 | Status: SHIPPED | OUTPATIENT
Start: 2020-02-03 | End: 2020-03-06

## 2020-02-03 RX ORDER — PREDNISONE 10 MG/1
TABLET ORAL
Qty: 20 TABLET | Refills: 0 | Status: SHIPPED | OUTPATIENT
Start: 2020-02-03 | End: 2020-04-14

## 2020-02-03 RX ORDER — AZITHROMYCIN 250 MG/1
TABLET, FILM COATED ORAL
Qty: 6 TABLET | Refills: 0 | Status: SHIPPED | OUTPATIENT
Start: 2020-02-03 | End: 2020-02-08

## 2020-02-03 RX ORDER — GUAIFENESIN 600 MG/1
1200 TABLET, EXTENDED RELEASE ORAL 2 TIMES DAILY
Qty: 180 TABLET | Refills: 3 | Status: SHIPPED | OUTPATIENT
Start: 2020-02-03 | End: 2021-07-01

## 2020-02-03 RX ORDER — ROSUVASTATIN CALCIUM 10 MG/1
TABLET, COATED ORAL
Qty: 90 TABLET | Refills: 3 | Status: SHIPPED | OUTPATIENT
Start: 2020-02-03 | End: 2021-02-15

## 2020-02-03 ASSESSMENT — MIFFLIN-ST. JEOR: SCORE: 1583.75
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
## 2020-02-04 LAB — DEPRECATED CALCIDIOL+CALCIFEROL SERPL-MC: 37 UG/L (ref 20–75)

## 2020-02-11 DIAGNOSIS — M47.816 LUMBAR ARTHROPATHY: ICD-10-CM

## 2020-02-11 RX ORDER — CARISOPRODOL 250 MG/1
TABLET ORAL
Qty: 120 TABLET | Refills: 1 | Status: CANCELLED | OUTPATIENT
Start: 2020-02-11

## 2020-02-12 NOTE — TELEPHONE ENCOUNTER
Carisoprodol 250 MG TABS    Last Written Prescription Date:  11/26/2019  Last Fill Quantity: 120,  # refills: 1   Last office visit: 2/3/2020 with prescribing provider:  Tasia Bennett Office Visit:   Next 5 appointments (look out 90 days)    Apr 14, 2020 12:40 PM CDT  Return Visit with Niles Cedillo MD  [facility] ([facility]) [address] [place] [address] [id number] [phone]           Requested Prescriptions   Pending Prescriptions Disp Refills     Carisoprodol 250 MG TABS [Pharmacy Med Name: CARISOPRODOL 250 MG TABLET] 120 tablet 1     Sig: TAKE 1 TAB (250 MG) BY MOUTH EVERY 4 HOURS AS NEEDED (FOR LOW BACK PAIN)       There is no refill protocol information for this order

## 2020-02-12 NOTE — TELEPHONE ENCOUNTER
Routing refill request to provider for review/approval because:  Drug not on the [facility] refill protocol     Please review and authorize if appropriate.    Thank you,   Chino SHABAZZ RN

## 2020-02-13 RX ORDER — METAXALONE 400 MG/1
1 TABLET ORAL DAILY PRN
Qty: 90 TABLET | Refills: 1 | Status: SHIPPED | OUTPATIENT
Start: 2020-02-13 | End: 2021-05-10

## 2020-02-20 ENCOUNTER — TELEPHONE (OUTPATIENT)
Dept: FAMILY MEDICINE | Facility: CLINIC | Age: 75
End: 2020-02-20

## 2020-02-20 DIAGNOSIS — M62.830 BACK MUSCLE SPASM: Primary | ICD-10-CM

## 2020-02-20 RX ORDER — CARISOPRODOL 250 MG/1
250 TABLET ORAL DAILY PRN
Qty: 31 TABLET | Refills: 1 | Status: SHIPPED | OUTPATIENT
Start: 2020-02-20 | End: 2020-06-01

## 2020-02-20 NOTE — TELEPHONE ENCOUNTER
Per pharmacy Metaxalone is too expensive for pt, pharmacy requesting new RX for Carisoprodol 250MG and notes that pt is unaware of med being changed.     Disp Refills Start End JESSICA   Metaxalone 400 MG TABS 90 tablet 1 2/13/2020  --   Sig - Route: Take 1 tablet by mouth daily as needed (back) - Oral

## 2020-03-04 ENCOUNTER — DOCUMENTATION ONLY (OUTPATIENT)
Dept: SLEEP MEDICINE | Facility: CLINIC | Age: 75
End: 2020-03-04

## 2020-03-04 NOTE — PROGRESS NOTES
6 month [facility] Recheck Visit     Diagnostic AHI: 19.9  PSG    Message left for patient to return call     Assessment: Pt not meeting objective benchmarks for compliance     Action plan: waiting for patient to return call.   pt to follow up per provider request       Device type: Auto-CPAP  PAP settings: CPAP min 5.0 cm  H20     CPAP max 15.0 cm  H20  Objective measures: No use since 9/8/19

## 2020-03-05 DIAGNOSIS — M48.062 SPINAL STENOSIS OF LUMBAR REGION WITH NEUROGENIC CLAUDICATION: ICD-10-CM

## 2020-03-05 NOTE — TELEPHONE ENCOUNTER
Reason for Call:  Medication or medication refill:    Do you use a [facility]?  Name of the pharmacy and phone number for the current request:     [facility] #[id number] - [place], [place] - [address] [place] [address]    Name of the medication requested: HYDROcodone-acetaminophen (NORCO) 5-325 MG tablet     Other request:     Can we leave a detailed message on this number? YES    Phone number patient can be reached at: Cell number on file:    Telephone Information:   Mobile [phone]     Best Time: any    Call taken on 3/5/2020 at 10:03 AM by Suri Santiago

## 2020-03-05 NOTE — TELEPHONE ENCOUNTER
Patient is followed by Yaneli Dozier for ongoing prescription of pain medication.  All refills should only be approved by this provider, or covering partner.    Medication(s): Norco.   Maximum quantity per month: 90  Clinic visit frequency required: Q 3 months      Controlled substance agreement:  Encounter-Level CSA - 05/26/2017:    Controlled Substance Agreement - Scan on 6/6/2017  3:48 PM: CONTROLLED SUBSTANCE AGREEMENT     Encounter-Level CSA - 11/14/2016:    Controlled Substance Agreement - Scan on 11/18/2016  7:45 AM: CONTROLLED SUBSTANCE AGREEMENT     Patient-Level CSA:    There are no patient-level csa.       Pain Clinic evaluation in the past: No    DIRE Total Score(s):  No flowsheet data found.    Last [facility] website verification:  done on 3/5/20 no concerns   [email or web address]

## 2020-03-05 NOTE — TELEPHONE ENCOUNTER
HYDROcodone-acetaminophen (NORCO) 5-325 MG tablet 90 tablet 0 2/3/2020           Last Written Prescription Date:  2/3/2020  Last Fill Quantity: 90,   # refills: 0  Last Office Visit: 2/3/2020  Future Office visit:    Next 5 appointments (look out 90 days)    Apr 14, 2020 12:40 PM CDT  Return Visit with Niles Cedillo MD  [facility] [address] [place] [id number]  [phone]           Routing refill request to provider for review/approval because:    Patient is followed by Yaneli Dozier for ongoing prescription of pain medication.  All refills should only be approved by this provider, or covering partner.     Medication(s): Hydrocodone.   Maximum quantity per month: 180  Clinic visit frequency required: Q 3 months      Controlled substance agreement:  Encounter-Level CSA - 05/26/2017:    Controlled Substance Agreement - Scan on 6/6/2017  3:48 PM: CONTROLLED SUBSTANCE AGREEMENT (below)         Encounter-Level CSA - 11/14/2016:    Controlled Substance Agreement - Scan on 11/18/2016  7:45 AM: CONTROLLED SUBSTANCE AGREEMENT (below)         Patient-Level CSA:    There are no patient-level csa.         Pain Clinic evaluation in the past: No     DIRE Total Score(s):  No flowsheet data found.     Last [facility] website verification:  done on 7/17/19  GM    Drug not on the [facility], [facility] or  Health refill protocol or controlled substance

## 2020-03-06 RX ORDER — HYDROCODONE BITARTRATE AND ACETAMINOPHEN 5; 325 MG/1; MG/1
1 TABLET ORAL 3 TIMES DAILY PRN
Qty: 90 TABLET | Refills: 0 | Status: SHIPPED | OUTPATIENT
Start: 2020-03-06 | End: 2020-04-16

## 2020-03-24 ENCOUNTER — DOCUMENTATION ONLY (OUTPATIENT)
Dept: LAB | Facility: CLINIC | Age: 75
End: 2020-03-24

## 2020-03-24 NOTE — PROGRESS NOTES
This pt has a lab appointment in April, but has no current orders in the pt chart. Please place orders to be completed at lab appointment in April.      Thank you,      Ivelisse Solorio lab staff

## 2020-03-30 NOTE — PROGRESS NOTES
RN: Please inform Mr. Thomas that he does not need repeat labs prior to the April follow-up appointment.  Please cancel his April lab appointment if only for rheumatology.      Niles Cedillo MD  3/30/2020 1:01 PM

## 2020-03-30 NOTE — PROGRESS NOTES
Called and informed patient of the message below.  Patient verbalized understanding and had a question regarding whether he needs to come in for his follow up visit on 4/14/20.   RN advised that we are switching clinic visits to video visits due to COVID-19.  Patient verbalized understanding and agreed to this plan.   Assisted him with downloading AW touchpoint sheridan.  Patient has no other questions.    Mert Marmolejo RN....3/30/2020 2:40 PM

## 2020-04-14 ENCOUNTER — VIRTUAL VISIT (OUTPATIENT)
Dept: RHEUMATOLOGY | Facility: CLINIC | Age: 75
End: 2020-04-14
Payer: COMMERCIAL

## 2020-04-14 DIAGNOSIS — M05.79 RHEUMATOID ARTHRITIS INVOLVING MULTIPLE SITES WITH POSITIVE RHEUMATOID FACTOR (H): Primary | ICD-10-CM

## 2020-04-14 DIAGNOSIS — M85.80 OSTEOPENIA, UNSPECIFIED LOCATION: ICD-10-CM

## 2020-04-14 DIAGNOSIS — Z79.899 HIGH RISK MEDICATIONS (NOT ANTICOAGULANTS) LONG-TERM USE: ICD-10-CM

## 2020-04-14 PROCEDURE — 99214 OFFICE O/P EST MOD 30 MIN: CPT | Mod: 95 | Performed by: INTERNAL MEDICINE

## 2020-04-14 RX ORDER — PREDNISONE 1 MG/1
4 TABLET ORAL DAILY
Qty: 360 TABLET | Refills: 0 | Status: SHIPPED | OUTPATIENT
Start: 2020-04-14 | End: 2020-04-28
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
## 2020-04-15 DIAGNOSIS — M48.062 SPINAL STENOSIS OF LUMBAR REGION WITH NEUROGENIC CLAUDICATION: ICD-10-CM

## 2020-04-15 NOTE — TELEPHONE ENCOUNTER
Reason for Call:  Medication or medication refill:    Do you use a [facility]?  Name of the pharmacy and phone number for the current request:     [facility][id number] - [place], [place] - [address] [place] [address]        Name of the medication requested: HYDROcodone-acetaminophen (NORCO) 5-325 MG tablet [[id number]] (Order [id number])       Other request:     Can we leave a detailed message on this number? YES    Phone number patient can be reached at: [phone]    Best Time: any    Call taken on 4/15/2020 at 9:37 AM by Pedrito Ayala

## 2020-04-16 RX ORDER — HYDROCODONE BITARTRATE AND ACETAMINOPHEN 5; 325 MG/1; MG/1
1 TABLET ORAL 3 TIMES DAILY PRN
Qty: 90 TABLET | Refills: 0 | Status: SHIPPED | OUTPATIENT
Start: 2020-04-16 | End: 2020-06-04

## 2020-04-16 NOTE — TELEPHONE ENCOUNTER
Controlled Substance Refill Request for HYDROcodone-acetaminophen (NORCO) 5-325 MG tablet   Last Written Prescription Date:  3/6/2020  Last Fill Quantity: 90,  # refills: 0   Last office visit: 2/3/2020 with prescribing provider:  Tasia   Future Office Visit:   Next 5 appointments (look out 90 days)    Jun 04, 2020 11:00 AM CDT  [facility] [address] [facility] [address]  [phone]         Problem List Complete:  Yes   checked in past 3 months?  Yes 3/5/2020      Chronic pain syndrome   Problem Detail     Noted:  8/27/2015    Priority:  Medium    Overview Addendum 7/17/2019  9:59 AM by Vern Skinner RN    Patient is followed by Yaneli Dozier for ongoing prescription of pain medication.  All refills should only be approved by this provider, or covering partner.     Medication(s): Hydrocodone.   Maximum quantity per month: 180  Clinic visit frequency required: Q 3 months      Controlled substance agreement:  Encounter-Level CSA - 05/26/2017:    Controlled Substance Agreement - Scan on 6/6/2017  3:48 PM: CONTROLLED SUBSTANCE AGREEMENT (below)         Encounter-Level CSA - 11/14/2016:    Controlled Substance Agreement - Scan on 11/18/2016  7:45 AM: CONTROLLED SUBSTANCE AGREEMENT (below)         Patient-Level CSA:    There are no patient-level csa.         Pain Clinic evaluation in the past: No     DIRE Total Score(s):  No flowsheet data found.     Last [facility] website verification:  done on 7/17/19     [email or web address]   Previous Version    Last Encounter with Chronic pain syndrome     Visit Information      Provider  Department  Center    7/15/2019  Yaneli Dozier MD  [facility]    Diagnoses      Codes  Comments    Spinal stenosis of lumbar region with neurogenic claudication  M48.062     Chronic pain syndrome  G89.4     Notes     Mary Nunes at 7/17/2019  2:13 PM     Status: Signed        Rx is Ready for - called patient and he will  this Friday  [facility] Unit Coordinator            Vern Skinner RN at 7/17/2019  9:55 AM     Status: Signed       Hydrocodone      Last Written Prescription Date:  6/13/19  Last Fill Quantity: 180,   # refills: 0  Last Office Visit: 6/13/19  Future Office visit:        Next 5 appointments (look out 90 days)    Aug 16, 2019  9:30 AM CDT  Office Visit with [facility] MD  [facility] [address] [facility] [address]  [phone]             Patient is followed by Yaneli Dozier for ongoing prescription of pain medication.  All refills should only be approved by this provider, or covering partner.     Medication(s): Hydrocodone.   Maximum quantity per month: 180  Clinic visit frequency required: Q 3 months      Controlled substance agreement:  Encounter-Level CSA - 05/26/2017:    Controlled Substance Agreement - Scan on 6/6/2017  3:48 PM: CONTROLLED SUBSTANCE AGREEMENT (below)         Encounter-Level CSA - 11/14/2016:    Controlled Substance Agreement - Scan on 11/18/2016  7:45 AM: CONTROLLED SUBSTANCE AGREEMENT (below)         Patient-Level CSA:    There are no patient-level csa.        Pain Clinic evaluation in the past: No     DIRE Total Score(s):  No flowsheet data found.     Last [facility] website verification:  done on 7/17/19 no concerns   [email or web address]     ROGERS Mike Amanda at 7/17/2019  9:58 AM     Status: Signed       PT following up on med request      Ph. [phone] VM is Chester Kerr at 7/15/2019  8:40 AM     Status: Signed       Reason for Call:  Medication or medication refill:     Do you use a [facility]?  Name of the pharmacy and phone number for the current request:       WRITTEN PRESCRIPTION REQUESTED        Name of the medication requested: Hydrocodone     Other request: Call once ready for      Can we leave a  detailed message on this number? YES     Phone number patient can be reached at: Work number on file:  [phone] (work)     Best Time: anytime     Call taken on 7/15/2019 at 8:40 AM by Chester Velazquez

## 2020-04-27 DIAGNOSIS — J44.9 CHRONIC OBSTRUCTIVE PULMONARY DISEASE, UNSPECIFIED COPD TYPE (H): ICD-10-CM

## 2020-04-27 DIAGNOSIS — M05.79 RHEUMATOID ARTHRITIS INVOLVING MULTIPLE SITES WITH POSITIVE RHEUMATOID FACTOR (H): ICD-10-CM

## 2020-04-28 ENCOUNTER — TELEPHONE (OUTPATIENT)
Dept: FAMILY MEDICINE | Facility: CLINIC | Age: 75
End: 2020-04-28

## 2020-04-28 DIAGNOSIS — M05.79 RHEUMATOID ARTHRITIS INVOLVING MULTIPLE SITES WITH POSITIVE RHEUMATOID FACTOR (H): Primary | ICD-10-CM

## 2020-04-28 RX ORDER — FOLIC ACID 1 MG/1
TABLET ORAL
Qty: 90 TABLET | Refills: 2 | Status: SHIPPED | OUTPATIENT
Start: 2020-04-28 | End: 2021-01-20

## 2020-04-28 RX ORDER — PREDNISONE 5 MG/1
TABLET ORAL
Qty: 50 TABLET | Refills: 0 | Status: SHIPPED | OUTPATIENT
Start: 2020-04-28 | End: 2020-10-14

## 2020-04-28 NOTE — TELEPHONE ENCOUNTER
Spoke with patient who started taking alendronate 2 weeks ago and is wondering if this med is worsening his RA. Reports worsening symptoms over past 2 weeks including pain and swelling of left wrist and right knee. Has difficulty gripping a glass or holding a cup of coffee due to pain. Patient is taking 12.5 mg methotrexate once weekly and prednisone 4 mg daily. Has been using a knee brace with mild relief. Wondering what else he can do. RN discussed that it is unlikely that alendronate is causing a worsening of symptoms. Will discuss symptoms with Dr. Cedillo.    (call patient's cell with update [phone])    Mert Marmolejo RN....4/28/2020 2:33 PM

## 2020-04-28 NOTE — TELEPHONE ENCOUNTER
.Reason for call:  Other   Patient called regarding (reason for call): call back  Additional comments: pt has questions about his medication. He would like to talk to Dr. Cedillo or another provider about his questions    Phone number to reach patient:  Home number on file [phone] (home)    Best Time:  Anytime    Can we leave a detailed message on this number?  YES    Travel screening: Negative

## 2020-04-28 NOTE — TELEPHONE ENCOUNTER
RN: please call Mr. Thomas to advise him that it sound like his RA is acting up.  The fosamax is not likely causing these symptoms.  Use the prednisone taper noted below and end with 5mg daily instead of 4mg daily that he is currently taking.    1. Rheumatoid arthritis involving multiple sites with positive rheumatoid factor (H)  - predniSONE (DELTASONE) 5 MG tablet; Prednisone 20mg daily x5days, then 15mg daily x5days, then 10mg daily x5days, then 5mg daily thereafter  Dispense: 50 tablet; Refill: 0    If he would like to review over the phone with me then schedule a time to do so and let me know.    Niles Cedillo MD  4/28/2020 4:18 PM

## 2020-04-29 NOTE — TELEPHONE ENCOUNTER
Called patient and left a detailed message relaying Dr. Cedillo's message below.    Mert Marmolejo RN....4/29/2020 8:38 AM

## 2020-05-29 DIAGNOSIS — M62.830 BACK MUSCLE SPASM: ICD-10-CM

## 2020-06-01 RX ORDER — CARISOPRODOL 250 MG/1
TABLET ORAL
Qty: 31 TABLET | Refills: 1 | Status: SHIPPED | OUTPATIENT
Start: 2020-06-01 | End: 2020-08-20

## 2020-06-01 NOTE — TELEPHONE ENCOUNTER
Routing refill request to provider for review/approval because:  Drug not on the [facility] refill protocol   Brenda CAMEJO RN,BSN

## 2020-06-04 ENCOUNTER — TELEPHONE (OUTPATIENT)
Dept: FAMILY MEDICINE | Facility: CLINIC | Age: 75
End: 2020-06-04

## 2020-06-04 ENCOUNTER — VIRTUAL VISIT (OUTPATIENT)
Dept: FAMILY MEDICINE | Facility: CLINIC | Age: 75
End: 2020-06-04
Payer: COMMERCIAL

## 2020-06-04 DIAGNOSIS — R06.02 SOB (SHORTNESS OF BREATH): Primary | ICD-10-CM

## 2020-06-04 DIAGNOSIS — J44.1 CHRONIC OBSTRUCTIVE PULMONARY DISEASE WITH ACUTE EXACERBATION (H): ICD-10-CM

## 2020-06-04 DIAGNOSIS — I10 ESSENTIAL HYPERTENSION, BENIGN: ICD-10-CM

## 2020-06-04 DIAGNOSIS — M48.062 SPINAL STENOSIS OF LUMBAR REGION WITH NEUROGENIC CLAUDICATION: ICD-10-CM

## 2020-06-04 DIAGNOSIS — M05.79 RHEUMATOID ARTHRITIS INVOLVING MULTIPLE SITES WITH POSITIVE RHEUMATOID FACTOR (H): ICD-10-CM

## 2020-06-04 PROCEDURE — 99214 OFFICE O/P EST MOD 30 MIN: CPT | Mod: 95 | Performed by: INTERNAL MEDICINE

## 2020-06-04 RX ORDER — HYDROCODONE BITARTRATE AND ACETAMINOPHEN 5; 325 MG/1; MG/1
1 TABLET ORAL 3 TIMES DAILY PRN
Qty: 90 TABLET | Refills: 0 | Status: SHIPPED | OUTPATIENT
Start: 2020-06-04 | End: 2020-07-15

## 2020-06-04 RX ORDER — DOXYCYCLINE 100 MG/1
100 CAPSULE ORAL 2 TIMES DAILY
Qty: 14 CAPSULE | Refills: 0 | Status: SHIPPED | OUTPATIENT
Start: 2020-06-04 | End: 2021-03-08

## 2020-06-04 RX ORDER — PREDNISONE 20 MG/1
40 TABLET ORAL DAILY
Qty: 10 TABLET | Refills: 0 | Status: SHIPPED | OUTPATIENT
Start: 2020-06-04 | End: 2020-06-09
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
## 2020-06-04 NOTE — TELEPHONE ENCOUNTER
Dr. Dozier-Patient needs a 1 week follow up with you. You are full next week.  Can you fit patient in?

## 2020-06-08 DIAGNOSIS — R06.02 SOB (SHORTNESS OF BREATH): ICD-10-CM

## 2020-06-08 DIAGNOSIS — J44.1 CHRONIC OBSTRUCTIVE PULMONARY DISEASE WITH ACUTE EXACERBATION (H): ICD-10-CM

## 2020-06-08 LAB
SARS-COV-2 RNA SPEC QL NAA+PROBE: NOT DETECTED
SPECIMEN SOURCE: NORMAL

## 2020-06-08 PROCEDURE — 99207 ZZC NO BILLABLE SERVICE THIS VISIT: CPT

## 2020-06-08 PROCEDURE — 99000 SPECIMEN HANDLING OFFICE-LAB: CPT | Performed by: INTERNAL MEDICINE

## 2020-06-08 PROCEDURE — U0003 INFECTIOUS AGENT DETECTION BY NUCLEIC ACID (DNA OR RNA); SEVERE ACUTE RESPIRATORY SYNDROME CORONAVIRUS 2 (SARS-COV-2) (CORONAVIRUS DISEASE [COVID-19]), AMPLIFIED PROBE TECHNIQUE, MAKING USE OF HIGH THROUGHPUT TECHNOLOGIES AS DESCRIBED BY CMS-2020-01-R: HCPCS | Mod: 90 | Performed by: INTERNAL MEDICINE
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
## 2020-06-08 NOTE — LETTER
Ann 10, 2020        Harrison Thomas  [address] [place] [address]    This letter provides a written record that you were tested for COVID-19 on 6/8/20.   Your result was negative.    This means that we didn t find the virus that causes COVID-19 in your sample. A test may show negative when you do actually have the virus. This can happen when the virus is in the early stages of infection, before you feel illness symptoms.    Even if you don t have symptoms, they may still appear. For safety, it s very important to follow these rules.    Keep yourself away from others (self-isolation):      Stay home. Don t go to work, school or anywhere else.     Stay in your own room (and use your own bathroom), if you can.    Stay away from others in your home. No hugging, kissing or shaking hands. No visitors.    Clean  high touch  surfaces often (doorknobs, counters, handles, etc.). Use a household cleaning spray or wipes.    Cover your mouth and nose with a mask, tissue or washcloth to avoid spreading germs.    Wash your hands and face often with soap and water.    Stay in self-isolation until you meet ALL of the guidelines below:    1. You have had no fever for at least 72 hours (that is 3 full days of no fever without the use of medicine that reduces fevers), AND  2. other symptoms (such as cough, shortness of breath) have gotten better, AND  3. at least 10 days have passed since your symptoms first appeared.    Going back to work  Check with your employer for any guidelines to follow for going back to work.    Employers: This document serves as formal notice that your employee tested negative for COVID-19, as of the testing date shown above.    For questions regarding this letter or your Negative COVID-19 result, call [phone] between 8A to 6:30P (M-F) and 10A to 6:30P (weekends).

## 2020-06-09 ENCOUNTER — VIRTUAL VISIT (OUTPATIENT)
Dept: CARDIOLOGY | Facility: CLINIC | Age: 75
End: 2020-06-09
Payer: COMMERCIAL

## 2020-06-09 DIAGNOSIS — I25.10 CORONARY ARTERY DISEASE INVOLVING NATIVE CORONARY ARTERY OF NATIVE HEART WITHOUT ANGINA PECTORIS: Primary | ICD-10-CM

## 2020-06-09 DIAGNOSIS — I34.0 MITRAL VALVE INSUFFICIENCY, UNSPECIFIED ETIOLOGY: ICD-10-CM

## 2020-06-09 PROCEDURE — 99213 OFFICE O/P EST LOW 20 MIN: CPT | Mod: 95 | Performed by: NURSE PRACTITIONER

## 2020-06-09 RX ORDER — NITROGLYCERIN 0.4 MG/1
0.4 TABLET SUBLINGUAL SEE ADMIN INSTRUCTIONS
Qty: 25 TABLET | Refills: 3 | Status: SHIPPED | OUTPATIENT
Start: 2020-06-09 | End: 2021-05-10

## 2020-06-09 NOTE — PATIENT INSTRUCTIONS
Nitroglycerine has been refilled    See us in one year with an echo    Have your cholesterol check this fall at Dr Dozier  Goal LDL is under 70 so if any higher, talk with her about increasing your rosuvastatin to 20mg daily
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
## 2020-06-10 NOTE — TELEPHONE ENCOUNTER
Talked to patient to offer a follow up appt and he says everything is Fine Covid test was Negative. Really does not need another appt and if he has any concerns he will call  Leydi Lexington [facility] Unit Coordinator

## 2020-06-26 ENCOUNTER — MYC MEDICAL ADVICE (OUTPATIENT)
Dept: RHEUMATOLOGY | Facility: CLINIC | Age: 75
End: 2020-06-26

## 2020-07-03 DIAGNOSIS — R60.9 EDEMA, UNSPECIFIED TYPE: ICD-10-CM

## 2020-07-03 RX ORDER — FUROSEMIDE 20 MG
20 TABLET ORAL DAILY
Qty: 90 TABLET | Refills: 0 | Status: SHIPPED | OUTPATIENT
Start: 2020-07-03 | End: 2020-09-28

## 2020-07-03 NOTE — TELEPHONE ENCOUNTER
Reason for Call:  Medication or medication refill:    Do you use a [facility]?  Name of the pharmacy and phone number for the current request:     [facility]/PHARMACY #[id number] [facility], [place] - [address] [place] [address]      Name of the medication requested: furosemide (LASIX) 20 MG tablet       Other request: NA    Can we leave a detailed message on this number? YES    Phone number patient can be reached at: Cell number on file:    Telephone Information:   Mobile [phone]       Best Time: Any    Call taken on 7/3/2020 at 12:11 PM by Alicia Alexander

## 2020-07-06 ENCOUNTER — TELEPHONE (OUTPATIENT)
Dept: RHEUMATOLOGY | Facility: CLINIC | Age: 75
End: 2020-07-06

## 2020-07-06 NOTE — TELEPHONE ENCOUNTER
Spoke with patient to convert his appointment on 7/21/2020 and patient refused to do phone or video, stated his last phone visit with you was for 3 minutes then got disconnected. Patient received a bill for 318.00 he had to pay out of pocket. Patient can not afford that.    Kinjal Quinn CMA Rheumatology  7/6/2020 3:39 PM

## 2020-07-09 NOTE — TELEPHONE ENCOUNTER
Rheumatology team: Please call to ask Mr. Thomas to have his toxicity monitoring labs performed; these labs are overdue. Okay to reschedule appointment to a later date.       Niles Cedillo MD  7/9/2020 10:07 AM

## 2020-07-10 NOTE — TELEPHONE ENCOUNTER
Left message for patient that he may cancel his appointment and reschedule for a later date but to have labs drawn as soon as he can.  Kinjal Quinn [facility] Rheumatology  7/10/2020 10:30 AM

## 2020-07-13 DIAGNOSIS — M48.062 SPINAL STENOSIS OF LUMBAR REGION WITH NEUROGENIC CLAUDICATION: ICD-10-CM

## 2020-07-13 NOTE — TELEPHONE ENCOUNTER
Controlled Substance Refill Request for Norco  Problem List Complete:    Yes    Last Written Prescription Date: 06/04/2020  Last Fill Quantity: 90,   # refills: 0    THE MOST RECENT OFFICE VISIT MUST BE WITHIN THE PAST 3 MONTHS. AT LEAST ONE FACE TO FACE VISIT MUST OCCUR EVERY 6 MONTHS. ADDITIONAL VISITS CAN BE VIRTUAL.  (THIS STATEMENT SHOULD BE DELETED.)    Last Office Visit with [facility] [place] [place] primary care provider: 06/04/2020    Future Office visit:   Next 5 appointments (look out 90 days)    Jul 21, 2020 11:00 AM CDT  Return Visit with Niles Cedillo MD  [facility] ([facility]) [address] [place] [id number]  [phone]          Controlled substance agreement:   Encounter-Level CSA - 05/26/2017:    Controlled Substance Agreement - Scan on 6/6/2017  3:48 PM: CONTROLLED SUBSTANCE AGREEMENT     Encounter-Level CSA - 11/14/2016:    Controlled Substance Agreement - Scan on 11/18/2016  7:45 AM: CONTROLLED SUBSTANCE AGREEMENT     Patient-Level CSA:    There are no patient-level csa.         Last Urine Drug Screen: No results found for: CDAUT, No results found for: COMDAT,   Cannabinoids (77-yjy-2-carboxy-9-THC)   Date Value Ref Range Status   02/03/2020 Not Detected NDET^Not Detected ng/mL Final     Comment:     Cutoff for a negative cannabinoid is 50 ng/mL or less.     Phencyclidine (Phencyclidine)   Date Value Ref Range Status   02/03/2020 Not Detected NDET^Not Detected ng/mL Final     Comment:     Cutoff for a negative PCP is 25 ng/mL or less.     Cocaine (Benzoylecgonine)   Date Value Ref Range Status   02/03/2020 Not Detected NDET^Not Detected ng/mL Final     Comment:     Cutoff for a negative cocaine is 150 ng/ml or less.     Methamphetamine (d-Methamphetamine)   Date Value Ref Range Status   02/03/2020 Not Detected NDET^Not Detected ng/mL Final     Comment:     Cutoff for a negative methamphetamine is 500 ng/ml or less.     Opiates (Morphine)   Date Value Ref  Range Status   02/03/2020 Not Detected NDET^Not Detected ng/mL Final     Comment:     Cutoff for a negative opiate is 100 ng/ml or less.     Amphetamine (d-Amphetamine)   Date Value Ref Range Status   02/03/2020 Not Detected NDET^Not Detected ng/mL Final     Comment:     Cutoff for a negative amphetamine is 500 ng/mL or less.     Benzodiazepines (Nordiazepam)   Date Value Ref Range Status   02/03/2020 Not Detected NDET^Not Detected ng/mL Final     Comment:     Cutoff for a negative benzodiazepine is 150 ng/ml or less.     Tricyclic Antidepressants (Desipramine)   Date Value Ref Range Status   02/03/2020 Not Detected NDET^Not Detected ng/mL Final     Comment:     Cutoff for a negative tricyclic antidepressant is 300 ng/ml or less.     Methadone (Methadone)   Date Value Ref Range Status   02/03/2020 Not Detected NDET^Not Detected ng/mL Final     Comment:     Cutoff for a negative methadone is 200 ng/ml or less.     Barbiturates (Butalbital)   Date Value Ref Range Status   02/03/2020 Not Detected NDET^Not Detected ng/mL Final     Comment:     Cutoff for a negative barbituate is 200 ng/ml or less.     Oxycodone (Oxycodone)   Date Value Ref Range Status   02/03/2020 Not Detected NDET^Not Detected ng/mL Final     Comment:     Cutoff for a negative Oxycodone is 100 ng/mL or less.     Propoxyphene (Norpropoxyphene)   Date Value Ref Range Status   02/03/2020 Not Detected NDET^Not Detected ng/mL Final     Comment:     Cutoff for a negative propoxyphene is 300 ng/ml or less     Buprenorphine (Buprenorphine)   Date Value Ref Range Status   02/03/2020 Not Detected NDET^Not Detected ng/mL Final     Comment:     Cutoff for a negative buprenorphine is 10 ng/ml or less        Processing:  Rx to be electronically transmitted to pharmacy by provider     [email or web address]   checked in past 3 months?  No, route to ROGERS Hernandez MA

## 2020-07-13 NOTE — TELEPHONE ENCOUNTER
Reason for Call:  Medication or medication refill:    Do you use a [facility]?  Name of the pharmacy and phone number for the current request:     [facility]/PHARMACY #[id number] - [place], [place] - [address] [place] [address]    Name of the medication requested:     HYDROcodone-acetaminophen (NORCO) 5-325 MG tablet  90 tablet     Other request: none    Can we leave a detailed message on this number? YES    Phone number patient can be reached at: Cell number on file:    Telephone Information:   Mobile [phone]       Best Time: any    Call taken on 7/13/2020 at 3:26 PM by Eun Ramires

## 2020-07-15 RX ORDER — HYDROCODONE BITARTRATE AND ACETAMINOPHEN 5; 325 MG/1; MG/1
1 TABLET ORAL 3 TIMES DAILY PRN
Qty: 90 TABLET | Refills: 0 | Status: SHIPPED | OUTPATIENT
Start: 2020-07-15 | End: 2020-08-17

## 2020-07-15 NOTE — TELEPHONE ENCOUNTER
Routing refill request to provider for review/approval because:  Drug not on the [facility] refill protocol     Last [facility] website verification:  done on 07/15/20 [facility]       Please review and authorize if appropriate,     Thank you,   Mckenzie COPELAND RN

## 2020-08-03 DIAGNOSIS — I10 BENIGN ESSENTIAL HYPERTENSION: ICD-10-CM

## 2020-08-05 RX ORDER — LISINOPRIL 40 MG/1
40 TABLET ORAL DAILY
Qty: 90 TABLET | Refills: 0 | Status: SHIPPED | OUTPATIENT
Start: 2020-08-05 | End: 2020-11-02

## 2020-08-05 NOTE — TELEPHONE ENCOUNTER
Prescription approved per [facility] Refill Protocol.  Barb SCOTT RN

## 2020-08-17 ENCOUNTER — TELEPHONE (OUTPATIENT)
Dept: FAMILY MEDICINE | Facility: CLINIC | Age: 75
End: 2020-08-17

## 2020-08-17 DIAGNOSIS — M48.062 SPINAL STENOSIS OF LUMBAR REGION WITH NEUROGENIC CLAUDICATION: ICD-10-CM

## 2020-08-17 RX ORDER — HYDROCODONE BITARTRATE AND ACETAMINOPHEN 5; 325 MG/1; MG/1
1 TABLET ORAL 3 TIMES DAILY PRN
Qty: 90 TABLET | Refills: 0 | Status: SHIPPED | OUTPATIENT
Start: 2020-08-17 | End: 2020-09-18

## 2020-08-17 NOTE — TELEPHONE ENCOUNTER
Reason for Call:  Same Day Appointment, Requested Provider:  Yaneli Dozier MD    PCP: Yaneli Dozier    Reason for visit: med check, labs    Duration of symptoms: N/A    Have you been treated for this in the past? Yes    Additional comments: Patient was hoping for a F2F with Dr Dozier around the end of September    Can we leave a detailed message on this number? YES    Phone number patient can be reached at: Cell number on file:    Telephone Information:   Mobile [phone]       Best Time: any    Call taken on 8/17/2020 at 1:57 PM by Eun Ramires

## 2020-08-17 NOTE — TELEPHONE ENCOUNTER
Reason for Call:  Medication or medication refill:    Do you use a [facility]?  Name of the pharmacy and phone number for the current request:       [facility]/PHARMACY #[id number] - [place], [place] - [address] [place] [address]      Name of the medication requested:     HYDROcodone-acetaminophen (NORCO) 5-325 MG tablet  90 tablet       Other request: he is completely out of medication    Can we leave a detailed message on this number? YES    Phone number patient can be reached at: Cell number on file:    Telephone Information:   Mobile [phone]       Best Time: any    Call taken on 8/17/2020 at 1:50 PM by Eun Ramires

## 2020-08-17 NOTE — TELEPHONE ENCOUNTER
Routing refill request to provider for review/approval because:  Last Med order and  done 7/15/20  Nora Irwin RN    Pending Prescriptions:                       Disp   Refills    HYDROcodone-acetaminophen (NORCO) 5-325 MG*90 tab*0        Sig: Take 1 tablet by mouth 3 times daily as needed for           moderate to severe pain

## 2020-08-19 NOTE — TELEPHONE ENCOUNTER
Called and informed patient that Dr. Dozier is not seeing patients in clinic due to COVID. I offered a video visit but patient declined.  I will call patient back to schedule a face to face visit

## 2020-08-20 DIAGNOSIS — M62.830 BACK MUSCLE SPASM: ICD-10-CM

## 2020-08-20 RX ORDER — CARISOPRODOL 250 MG/1
TABLET ORAL
Qty: 31 TABLET | Refills: 1 | Status: SHIPPED | OUTPATIENT
Start: 2020-08-20 | End: 2020-11-05

## 2020-08-20 NOTE — TELEPHONE ENCOUNTER
Routing refill request to provider for review/approval because:  Drug not on the [facility] refill protocol     Last Written Prescription Date:  6/1/20  Last Fill Quantity: 30,  # refills: 1   Last office visit: 2/3/2020 with prescribing provider:  Tasia Bennett Office Visit:            Nora Irwin RN

## 2020-09-18 ENCOUNTER — TELEPHONE (OUTPATIENT)
Dept: FAMILY MEDICINE | Facility: CLINIC | Age: 75
End: 2020-09-18

## 2020-09-18 DIAGNOSIS — M48.062 SPINAL STENOSIS OF LUMBAR REGION WITH NEUROGENIC CLAUDICATION: ICD-10-CM

## 2020-09-18 RX ORDER — HYDROCODONE BITARTRATE AND ACETAMINOPHEN 5; 325 MG/1; MG/1
1 TABLET ORAL 3 TIMES DAILY PRN
Qty: 90 TABLET | Refills: 0 | Status: SHIPPED | OUTPATIENT
Start: 2020-09-18 | End: 2020-10-21

## 2020-09-18 NOTE — TELEPHONE ENCOUNTER
Reason for Call:  Medication or medication refill:    Do you use a [facility]?  Name of the pharmacy and phone number for the current request:     [facility]/PHARMACY #[id number] - [place], [place] - [address] [place] [address]    Name of the medication requested: HYDROcodone-acetaminophen (NORCO) 5-325 MG tablet     Other request:     Can we leave a detailed message on this number? YES    Phone number patient can be reached at: Cell number on file:    Telephone Information:   Mobile [phone]     Best Time: any    Call taken on 9/18/2020 at 4:07 PM by Suri Santiago

## 2020-09-18 NOTE — TELEPHONE ENCOUNTER
checked 7/15  Pending Prescriptions:                       Disp   Refills    HYDROcodone-acetaminophen (NORCO) 5-325 M*90 tab*0            Sig: Take 1 tablet by mouth 3 times daily as needed           for moderate to severe pain    Last Written Prescription Date:  8/17/20  Last Fill Quantity: 90,  # refills: 0   Last office visit: 2/3/2020 with prescribing provider:  Tasia    Had a virtual 6/2020   Future Office Visit:   Next 5 appointments (look out 90 days)    Sep 21, 2020  6:40 PM CDT  Nurse Only with  [place] [facility] [address] [address] [phone]

## 2020-09-21 ENCOUNTER — ALLIED HEALTH/NURSE VISIT (OUTPATIENT)
Dept: NURSING | Facility: CLINIC | Age: 75
End: 2020-09-21
Payer: COMMERCIAL

## 2020-09-21 DIAGNOSIS — Z23 NEED FOR PROPHYLACTIC VACCINATION AND INOCULATION AGAINST INFLUENZA: Primary | ICD-10-CM

## 2020-09-21 PROCEDURE — 90471 IMMUNIZATION ADMIN: CPT

## 2020-09-21 PROCEDURE — 99207 ZZC NO CHARGE NURSE ONLY: CPT

## 2020-09-21 PROCEDURE — 90662 IIV NO PRSV INCREASED AG IM: CPT

## 2020-09-26 DIAGNOSIS — R60.9 EDEMA, UNSPECIFIED TYPE: ICD-10-CM

## 2020-09-28 RX ORDER — FUROSEMIDE 20 MG
TABLET ORAL
Qty: 90 TABLET | Refills: 0 | Status: SHIPPED | OUTPATIENT
Start: 2020-09-28 | End: 2020-12-23

## 2020-10-11 DIAGNOSIS — M05.79 RHEUMATOID ARTHRITIS INVOLVING MULTIPLE SITES WITH POSITIVE RHEUMATOID FACTOR (H): ICD-10-CM

## 2020-10-12 NOTE — TELEPHONE ENCOUNTER
Sent patient a Nveloped message inquiring about prednisone request.    Mert Marmolejo RN....10/12/2020 10:26 AM

## 2020-10-13 NOTE — TELEPHONE ENCOUNTER
Dr. Cedillo, please see patient's mychart message from 10/12/20 where he reports he requesting course of prednisone due to flare.    Mert Marmolejo RN....10/13/2020 9:41 AM

## 2020-10-14 RX ORDER — PREDNISONE 5 MG/1
TABLET ORAL
Qty: 50 TABLET | Refills: 0 | Status: SHIPPED | OUTPATIENT
Start: 2020-10-14 | End: 2020-11-23

## 2020-10-15 NOTE — TELEPHONE ENCOUNTER
Rheumatology team: Please call to notify Mr. Thomas that prednisone has been refilled.  Niles Cedillo MD  10/14/2020 9:16 PM

## 2020-10-15 NOTE — TELEPHONE ENCOUNTER
Patient was informed that prednisone rx was refilled.    Mert Marmolejo RN....10/15/2020 5:35 PM

## 2020-10-16 DIAGNOSIS — M05.79 RHEUMATOID ARTHRITIS INVOLVING MULTIPLE SITES WITH POSITIVE RHEUMATOID FACTOR (H): ICD-10-CM

## 2020-10-20 DIAGNOSIS — M48.062 SPINAL STENOSIS OF LUMBAR REGION WITH NEUROGENIC CLAUDICATION: ICD-10-CM

## 2020-10-20 NOTE — TELEPHONE ENCOUNTER
Reason for Call:  Medication or medication refill:    Do you use a [facility]?  Name of the pharmacy and phone number for the current request:     [facility]/PHARMACY #[id number] [facility], [place] - [address] [place] [address]    Name of the medication requested: HYDROcodone-acetaminophen (NORCO) 5-325 MG tablet    Other request:     Can we leave a detailed message on this number? YES    Phone number patient can be reached at: Cell number on file [phone] (cell)    Best Time: ANY    Call taken on 10/20/2020 at 8:22 AM by Adeola Rivera

## 2020-10-20 NOTE — TELEPHONE ENCOUNTER
Norco  Last Written Prescription Date:  9/18/2020  Last Fill Quantity: 90,  # refills: 0   Last office visit: 2/3/2020 with prescribing provider:  Tasia Bennett Office Visit:      Last [facility] website verification:  done on 07/15/20     [email or web address]    Routing refill request to provider for review/approval because:  Drug not on the [facility] refill protocol     VIRGINIA Talley, RN  [employer] [facility]

## 2020-10-21 RX ORDER — HYDROCODONE BITARTRATE AND ACETAMINOPHEN 5; 325 MG/1; MG/1
1 TABLET ORAL 3 TIMES DAILY PRN
Qty: 90 TABLET | Refills: 0 | Status: SHIPPED | OUTPATIENT
Start: 2020-10-21 | End: 2020-11-23

## 2020-10-21 NOTE — TELEPHONE ENCOUNTER
Medication:   Methotrexate  Last written on:   4/14/2020  Quantity:   60    Refills:   1    Last office visit:   4/14/2020  Canceled:  7/21/2020  Next office visit:     Last labs:   2/3/2020    Kinjal Quinn CMA Rheumatology  10/21/2020 9:10 AM

## 2020-10-22 NOTE — TELEPHONE ENCOUNTER
Rheumatology team: Please call to notify Mr. Thomas that methotrexate has not been refilled.  Labs for toxicity monitoring are overdue and needed to ensure safety. Please re-submit refill request after labs have been completed.  Rheumatology visit is also overdue and follow-up advised.   Niles Cedillo MD  10/22/2020 6:49 AM

## 2020-10-22 NOTE — TELEPHONE ENCOUNTER
Called patient and scheduled a lab apt tomorrow and a f/u apt with Dr. Cedillo on 1/5/21. Advised patient that Dr. Cedillo will refill methotrexate if labs look okay. Patient verbalized understanding. Will forward request to Dr. Cedillo once labs are completed.    Mert Marmolejo RN....10/22/2020 11:56 AM

## 2020-10-23 DIAGNOSIS — Z79.899 HIGH RISK MEDICATIONS (NOT ANTICOAGULANTS) LONG-TERM USE: ICD-10-CM

## 2020-10-23 DIAGNOSIS — E78.5 HYPERLIPIDEMIA LDL GOAL <100: ICD-10-CM

## 2020-10-23 DIAGNOSIS — I10 ESSENTIAL HYPERTENSION, BENIGN: ICD-10-CM

## 2020-10-23 DIAGNOSIS — M05.79 RHEUMATOID ARTHRITIS INVOLVING MULTIPLE SITES WITH POSITIVE RHEUMATOID FACTOR (H): ICD-10-CM

## 2020-10-23 LAB
ALBUMIN SERPL-MCNC: 3.4 G/DL (ref 3.4–5)
ALP SERPL-CCNC: 98 U/L (ref 40–150)
ALT SERPL W P-5'-P-CCNC: 26 U/L (ref 0–70)
ANION GAP SERPL CALCULATED.3IONS-SCNC: 3 MMOL/L (ref 3–14)
AST SERPL W P-5'-P-CCNC: 14 U/L (ref 0–45)
BASOPHILS # BLD AUTO: 0 10E9/L (ref 0–0.2)
BASOPHILS NFR BLD AUTO: 0.5 %
BILIRUB DIRECT SERPL-MCNC: 0.1 MG/DL (ref 0–0.2)
BILIRUB SERPL-MCNC: 0.4 MG/DL (ref 0.2–1.3)
BUN SERPL-MCNC: 13 MG/DL (ref 7–30)
CALCIUM SERPL-MCNC: 8.8 MG/DL (ref 8.5–10.1)
CHLORIDE SERPL-SCNC: 108 MMOL/L (ref 94–109)
CHOLEST SERPL-MCNC: 137 MG/DL
CO2 SERPL-SCNC: 28 MMOL/L (ref 20–32)
CREAT SERPL-MCNC: 0.85 MG/DL (ref 0.66–1.25)
CRP SERPL-MCNC: 9.7 MG/L (ref 0–8)
DIFFERENTIAL METHOD BLD: NORMAL
EOSINOPHIL # BLD AUTO: 0.3 10E9/L (ref 0–0.7)
EOSINOPHIL NFR BLD AUTO: 4.4 %
ERYTHROCYTE [DISTWIDTH] IN BLOOD BY AUTOMATED COUNT: 14.3 % (ref 10–15)
ERYTHROCYTE [SEDIMENTATION RATE] IN BLOOD BY WESTERGREN METHOD: 8 MM/H (ref 0–20)
GFR SERPL CREATININE-BSD FRML MDRD: 87 ML/MIN/{1.73_M2}
GLUCOSE SERPL-MCNC: 84 MG/DL (ref 70–99)
HCT VFR BLD AUTO: 43.8 % (ref 40–53)
HDLC SERPL-MCNC: 48 MG/DL
HGB BLD-MCNC: 14.7 G/DL (ref 13.3–17.7)
LDLC SERPL CALC-MCNC: 76 MG/DL
LYMPHOCYTES # BLD AUTO: 1.4 10E9/L (ref 0.8–5.3)
LYMPHOCYTES NFR BLD AUTO: 21.1 %
MCH RBC QN AUTO: 32.1 PG (ref 26.5–33)
MCHC RBC AUTO-ENTMCNC: 33.6 G/DL (ref 31.5–36.5)
MCV RBC AUTO: 96 FL (ref 78–100)
MONOCYTES # BLD AUTO: 0.8 10E9/L (ref 0–1.3)
MONOCYTES NFR BLD AUTO: 12.4 %
NEUTROPHILS # BLD AUTO: 4.1 10E9/L (ref 1.6–8.3)
NEUTROPHILS NFR BLD AUTO: 61.6 %
NONHDLC SERPL-MCNC: 89 MG/DL
PLATELET # BLD AUTO: 153 10E9/L (ref 150–450)
POTASSIUM SERPL-SCNC: 4.5 MMOL/L (ref 3.4–5.3)
PROT SERPL-MCNC: 6.9 G/DL (ref 6.8–8.8)
RBC # BLD AUTO: 4.58 10E12/L (ref 4.4–5.9)
SODIUM SERPL-SCNC: 139 MMOL/L (ref 133–144)
TRIGL SERPL-MCNC: 66 MG/DL
WBC # BLD AUTO: 6.6 10E9/L (ref 4–11)

## 2020-10-23 PROCEDURE — 80053 COMPREHEN METABOLIC PANEL: CPT | Performed by: INTERNAL MEDICINE

## 2020-10-23 PROCEDURE — 85025 COMPLETE CBC W/AUTO DIFF WBC: CPT | Performed by: INTERNAL MEDICINE

## 2020-10-23 PROCEDURE — 85652 RBC SED RATE AUTOMATED: CPT | Performed by: INTERNAL MEDICINE

## 2020-10-23 PROCEDURE — 80061 LIPID PANEL: CPT | Performed by: INTERNAL MEDICINE

## 2020-10-23 PROCEDURE — 82248 BILIRUBIN DIRECT: CPT | Performed by: INTERNAL MEDICINE

## 2020-10-23 PROCEDURE — 86140 C-REACTIVE PROTEIN: CPT | Performed by: INTERNAL MEDICINE

## 2020-10-23 PROCEDURE — 36415 COLL VENOUS BLD VENIPUNCTURE: CPT | Performed by: INTERNAL MEDICINE

## 2020-10-28 NOTE — TELEPHONE ENCOUNTER
Rheumatology team: Please call to notify Mr. Thomas that methotrexate has been refilled. Labs were okay.   Niles Cedillo MD  10/28/2020 9:01 AM

## 2020-10-29 NOTE — TELEPHONE ENCOUNTER
Called patient and inform patient that methotrexate was refilled and labs looked okay. Patient verbalized understanding and stated he already picked up the prescription. He has no questions.    Mert Marmolejo RN....10/29/2020 2:42 PM

## 2020-11-01 DIAGNOSIS — I10 BENIGN ESSENTIAL HYPERTENSION: ICD-10-CM

## 2020-11-02 RX ORDER — LISINOPRIL 40 MG/1
TABLET ORAL
Qty: 90 TABLET | Refills: 0 | Status: SHIPPED | OUTPATIENT
Start: 2020-11-02 | End: 2021-02-12

## 2020-11-03 DIAGNOSIS — M62.830 BACK MUSCLE SPASM: ICD-10-CM

## 2020-11-04 NOTE — TELEPHONE ENCOUNTER
Carisoprodol 250 MG TABS 31 tablet 1 8/20/2020  No   Sig: TAKE 1 TABLET (250 MG) BY MOUTH DAILY AS NEEDED (BACK ISSUE)     Last Written Prescription Date:  8-  Last Fill Quantity: 31,  # refills: 1   Last office visit: 2/3/2020 with prescribing provider:     Future Office Visit:   Next 5 appointments (look out 90 days)    Jan 05, 2021  3:20 PM  Return Visit with Niles Cedillo MD  [facility] ([facility]) [address] [place] [id number] [phone]        Routing refill request to provider for review/approval because:  Drug not on the [facility], [facility] or [facility] refill protocol or controlled substance

## 2020-11-05 DIAGNOSIS — I10 BENIGN ESSENTIAL HYPERTENSION: ICD-10-CM

## 2020-11-05 RX ORDER — METOPROLOL SUCCINATE 25 MG/1
25 TABLET, EXTENDED RELEASE ORAL DAILY
Qty: 90 TABLET | Refills: 2 | Status: SHIPPED | OUTPATIENT
Start: 2020-11-05 | End: 2021-03-08

## 2020-11-05 RX ORDER — METOPROLOL SUCCINATE 50 MG/1
50 TABLET, EXTENDED RELEASE ORAL DAILY
Qty: 90 TABLET | Refills: 2 | Status: SHIPPED | OUTPATIENT
Start: 2020-11-05 | End: 2021-03-08

## 2020-11-05 RX ORDER — CARISOPRODOL 250 MG/1
TABLET ORAL
Qty: 31 TABLET | Refills: 1 | Status: SHIPPED | OUTPATIENT
Start: 2020-11-05 | End: 2021-02-12

## 2020-11-05 RX ORDER — AMLODIPINE BESYLATE 5 MG/1
5 TABLET ORAL 2 TIMES DAILY
Qty: 90 TABLET | Refills: 2 | Status: SHIPPED | OUTPATIENT
Start: 2020-11-05 | End: 2020-12-17

## 2020-11-05 NOTE — TELEPHONE ENCOUNTER
Routing refill request to provider for review/approval because:  Drug not on the [facility] refill protocol       Please review and authorize if appropriate,     Thank you,   Mckenzie COPELAND RN

## 2020-11-05 NOTE — TELEPHONE ENCOUNTER
Prescription approved per [facility] Refill Protocol.    Mckenzie CASANOVA RN

## 2020-11-12 NOTE — TELEPHONE ENCOUNTER
ANTICOAGULATION FOLLOW-UP CLINIC VISIT    Patient Name:  Jt Montgomery  Date:  2020  Contact Type:  Telephone    SUBJECTIVE:  Patient Findings         Clinical Outcomes     Negatives:  Major bleeding event, Thromboembolic event, Anticoagulation-related hospital admission, Anticoagulation-related ED visit, Anticoagulation-related fatality           OBJECTIVE    Recent labs: (last 7 days)     20  1234   INR 2.40*       ASSESSMENT / PLAN  INR assessment THER    Recheck INR In: 4 WEEKS    INR Location Outside lab      Anticoagulation Summary  As of 2020    INR goal:  2.0-3.0   TTR:  61.2 % (11 mo)   INR used for dosin.40 (2020)   Warfarin maintenance plan:  7.5 mg (5 mg x 1.5) every Sun, Tue, Thu; 5 mg (5 mg x 1) all other days   Full warfarin instructions:  7.5 mg every Sun, Tue, Thu; 5 mg all other days   Weekly warfarin total:  42.5 mg   No change documented:  Marlena Hong RN   Plan last modified:  Marlena Hong RN (2020)   Next INR check:  12/10/2020   Target end date:  Indefinite    Indications    Atrial fibrillation (AFIB) on Coumadin [I48.91]  Long term current use of anticoagulants with INR goal of 2.0-3.0 (Resolved) [Z79.01]  Paroxysmal atrial fibrillation (H) [I48.0]  Long term current use of anticoagulants with INR goal of 2.0-3.0 [Z79.01]             Anticoagulation Episode Summary     INR check location:      Preferred lab:      Send INR reminders to:  [facility] HEART INR NURSE    Comments:        Anticoagulation Care Providers     Provider Role Specialty Phone number    Lottie Gomez DO Referring Cardiovascular Disease [phone]            See the Encounter Report to view Anticoagulation Flowsheet and Dosing Calendar (Go to Encounters tab in chart review, and find the Anticoagulation Therapy Visit)    INR 2.40 No change in meds. Diet has been variable but he always eats a plant based diet. Denies abnormal bleeding or bruising. Will continue current  Pt is calling back, he will be available around 2 pm    dosing of 7.5 mg SuTuTh and 5 mg all other days with recheck in 4 weeks.  Marlena Hong RN

## 2020-11-20 DIAGNOSIS — M48.062 SPINAL STENOSIS OF LUMBAR REGION WITH NEUROGENIC CLAUDICATION: ICD-10-CM

## 2020-11-20 NOTE — TELEPHONE ENCOUNTER
Reason for Call:  Medication or medication refill:    Do you use a [facility]?  Name of the pharmacy and phone number for the current request:     [facility]/PHARMACY #[id number] [facility], [place] - [address] [place] [address]  Name of the medication requested: HYDROcodone-acetaminophen (NORCO) 5-325 MG tablet [82037] (Order [id number])      Other request:     Can we leave a detailed message on this number? YES    Phone number patient can be reached at: Home number on file [phone] (home)    Best Time: any    Call taken on 11/20/2020 at 1:25 PM by Pedrito Ayala

## 2020-11-23 RX ORDER — HYDROCODONE BITARTRATE AND ACETAMINOPHEN 5; 325 MG/1; MG/1
1 TABLET ORAL 3 TIMES DAILY PRN
Qty: 90 TABLET | Refills: 0 | Status: SHIPPED | OUTPATIENT
Start: 2020-11-23 | End: 2020-12-28

## 2020-12-09 ENCOUNTER — APPOINTMENT (OUTPATIENT)
Dept: URBAN - METROPOLITAN AREA CLINIC 255 | Age: 75
Setting detail: DERMATOLOGY
End: 2020-12-10

## 2020-12-09 DIAGNOSIS — L57.0 ACTINIC KERATOSIS: ICD-10-CM

## 2020-12-09 DIAGNOSIS — L82.0 INFLAMED SEBORRHEIC KERATOSIS: ICD-10-CM

## 2020-12-09 PROCEDURE — 17000 DESTRUCT PREMALG LESION: CPT | Mod: 59

## 2020-12-09 PROCEDURE — OTHER COUNSELING: OTHER

## 2020-12-09 PROCEDURE — OTHER LIQUID NITROGEN: OTHER

## 2020-12-09 PROCEDURE — OTHER MIPS QUALITY: OTHER

## 2020-12-09 PROCEDURE — 17110 DESTRUCT B9 LESION 1-14: CPT

## 2020-12-09 ASSESSMENT — LOCATION DETAILED DESCRIPTION DERM
LOCATION DETAILED: LEFT CENTRAL MALAR CHEEK
LOCATION DETAILED: LEFT MEDIAL MALAR CHEEK

## 2020-12-09 ASSESSMENT — LOCATION SIMPLE DESCRIPTION DERM: LOCATION SIMPLE: LEFT CHEEK

## 2020-12-09 ASSESSMENT — LOCATION ZONE DERM: LOCATION ZONE: FACE

## 2020-12-09 NOTE — PROCEDURE: LIQUID NITROGEN
Medical Necessity Clause: This procedure was medically necessary because the lesions that were treated were:
Post-Care Instructions: I reviewed with the patient in detail post-care instructions. Patient is to wear sunprotection, and avoid picking at any of the treated lesions. Pt may apply Vaseline to crusted or scabbing areas.
Render Note In Bullet Format When Appropriate: No
Medical Necessity Information: It is in your best interest to select a reason for this procedure from the list below. All of these items fulfill various CMS LCD requirements except the new and changing color options.
Duration Of Freeze Thaw-Cycle (Seconds): 10
Number Of Freeze-Thaw Cycles: 1 freeze-thaw cycle
Detail Level: Detailed
Duration Of Freeze Thaw-Cycle (Seconds): 15-20
Consent: The patient's consent was obtained including but not limited to risks of crusting, scabbing, blistering, scarring, darker or lighter pigmentary change, recurrence, incomplete removal and infection.

## 2020-12-09 NOTE — PROCEDURE: MIPS QUALITY
Quality 226: Preventive Care And Screening: Tobacco Use: Screening And Cessation Intervention: Patient screened for tobacco use and is an ex/non-smoker
Quality 130: Documentation Of Current Medications In The Medical Record: Current Medications Documented
Quality 111:Pneumonia Vaccination Status For Older Adults: Pneumococcal Vaccination Previously Received
Detail Level: Detailed
Quality 110: Preventive Care And Screening: Influenza Immunization: Influenza Immunization previously received during influenza season
Quality 431: Preventive Care And Screening: Unhealthy Alcohol Use - Screening: Patient screened for unhealthy alcohol use using a single question and scores less than 2 times per year

## 2020-12-16 DIAGNOSIS — I10 BENIGN ESSENTIAL HYPERTENSION: ICD-10-CM

## 2020-12-17 RX ORDER — AMLODIPINE BESYLATE 5 MG/1
TABLET ORAL
Qty: 90 TABLET | Refills: 1 | Status: SHIPPED | OUTPATIENT
Start: 2020-12-17 | End: 2021-01-06

## 2020-12-21 DIAGNOSIS — M85.80 OSTEOPENIA, UNSPECIFIED LOCATION: ICD-10-CM

## 2020-12-21 RX ORDER — ALENDRONATE SODIUM 70 MG/1
70 TABLET ORAL
Qty: 12 TABLET | Refills: 0 | Status: SHIPPED | OUTPATIENT
Start: 2020-12-21 | End: 2021-03-10

## 2020-12-21 NOTE — TELEPHONE ENCOUNTER
Medication:   Alendronate sodium 70 mg  Last written on:   12/31/2019  Quantity:   12    Refills:   3    Last office visit:   4/14/2020  Next office visit:   1/5/2021  Last labs:   10/23/2020    Kinjal Quinn CMA Rheumatology  12/21/2020 11:45 AM

## 2020-12-22 NOTE — TELEPHONE ENCOUNTER
Rheumatology team: Please call to notify Mr. Thomas that alendronate has been refilled.  Niles Cedillo MD  12/21/2020 10:51 PM

## 2020-12-23 DIAGNOSIS — R60.9 EDEMA, UNSPECIFIED TYPE: ICD-10-CM

## 2020-12-23 RX ORDER — FUROSEMIDE 20 MG
20 TABLET ORAL DAILY
Qty: 90 TABLET | Refills: 0 | Status: SHIPPED | OUTPATIENT
Start: 2020-12-23 | End: 2020-12-31

## 2020-12-28 DIAGNOSIS — M48.062 SPINAL STENOSIS OF LUMBAR REGION WITH NEUROGENIC CLAUDICATION: ICD-10-CM

## 2020-12-28 RX ORDER — HYDROCODONE BITARTRATE AND ACETAMINOPHEN 5; 325 MG/1; MG/1
1 TABLET ORAL 3 TIMES DAILY PRN
Qty: 90 TABLET | Refills: 0 | Status: SHIPPED | OUTPATIENT
Start: 2020-12-28 | End: 2021-01-29

## 2020-12-28 NOTE — TELEPHONE ENCOUNTER
Patient is followed by Yaneli Dozier for ongoing prescription of pain medication.  All refills should only be approved by this provider, or covering partner.    Medication(s): Hydrocodone.   Maximum quantity per month: 180  Clinic visit frequency required: Q 3 months      Controlled substance agreement:  Encounter-Level CSA - 05/26/2017:    Controlled Substance Agreement - Scan on 6/6/2017  3:48 PM: CONTROLLED SUBSTANCE AGREEMENT (below)       Encounter-Level CSA - 11/14/2016:    Controlled Substance Agreement - Scan on 11/18/2016  7:45 AM: CONTROLLED SUBSTANCE AGREEMENT (below)       Patient-Level CSA:    There are no patient-level csa.       Pain Clinic evaluation in the past: No    DIRE Total Score(s):  No flowsheet data found.    Last [facility] website verification:  done on 12/28/20 no concerns   [email or web address]    Thank you,  Chino SHABAZZ RN

## 2020-12-28 NOTE — TELEPHONE ENCOUNTER
Reason for Call:  Other prescription    Detailed comments: patient needing medication hyrdoCODONE 5mg refilled with pharmacy on file. Patient is completely out.     Phone Number Patient can be reached at: Cell number on file:    Telephone Information:   Mobile [phone]       Best Time: anytime    Can we leave a detailed message on this number? YES    Call taken on 12/28/2020 at 10:32 AM by Suri Ballard

## 2020-12-28 NOTE — TELEPHONE ENCOUNTER
Controlled Substance Refill Request for       HYDROcodone-acetaminophen (NORCO) 5-325 MG tablet 90 tablet 0 11/23/2020  No   Sig - Route: Take 1 tablet by mouth 3 times daily as needed for moderate to severe pain      Last Written Prescription Date:  11/23/2020  Last Fill Quantity: 90,  # refills: 0   Last office visit: 2/3/2020 with prescribing provider:     Future Office Visit:            Problem List Complete:  Yes     Chronic, continuous use of opioids          Details  Code: F11.90  Sort Priority: Medium  Noted: 8/27/2015  Share w/ Pt: []       Overview  Edited:  Mckenzie Brantley RN  7/15/2020  Patient is followed by Yaneli Dozier for ongoing prescription of pain medication. All refills should only be approved by this provider, or covering partner.     Medication(s): Hydrocodone.   Maximum quantity per month: 180  Clinic visit frequency required: Q 3 months   Controlled substance agreement:  Encounter-Level CSA - 05/26/2017:    Controlled Substance Agreement - Scan on 6/6/2017 3:48 PM: CONTROLLED SUBSTANCE AGREEMENT (below)         Encounter-Level CSA - 11/14/2016:    Controlled Substance Agreement - Scan on 11/18/2016 7:45 AM: CONTROLLED SUBSTANCE AGREEMENT (below)         Patient-Level CSA:    There are no patient-level csa.         Pain Clinic evaluation in the past: No     DIRE Total Score(s):  No flowsheet data found.     Last [facility] website verification: done on 07/15/20    [email or web address]                     checked in past 3 months?  No, route to RN

## 2020-12-30 DIAGNOSIS — R60.9 EDEMA, UNSPECIFIED TYPE: ICD-10-CM

## 2020-12-31 RX ORDER — FUROSEMIDE 20 MG
TABLET ORAL
Qty: 90 TABLET | Refills: 1 | Status: SHIPPED | OUTPATIENT
Start: 2020-12-31 | End: 2021-04-29

## 2021-01-06 DIAGNOSIS — I10 BENIGN ESSENTIAL HYPERTENSION: ICD-10-CM

## 2021-01-07 RX ORDER — AMLODIPINE BESYLATE 5 MG/1
TABLET ORAL
Qty: 180 TABLET | Refills: 1 | Status: SHIPPED | OUTPATIENT
Start: 2021-01-07 | End: 2021-05-10

## 2021-01-11 DIAGNOSIS — M05.79 RHEUMATOID ARTHRITIS INVOLVING MULTIPLE SITES WITH POSITIVE RHEUMATOID FACTOR (H): ICD-10-CM

## 2021-01-13 NOTE — TELEPHONE ENCOUNTER
Per Dr. Cedillo, a rheumatology follow up is required prior to additional refills. Patient does not have an apt scheduled. Called patient and left a message for him to return call to RN's direct line. Also sent a [employer] message informing patient that an apt is needed.    Mert Marmolejo RN....1/13/2021 3:08 PM

## 2021-01-13 NOTE — TELEPHONE ENCOUNTER
"Patient returned the call and reports he does not want to schedule a follow up apt. He states \"I do not see the point in it if nothing has changed. The phone visits are expensive.\" RN discussed the importance of frequent monitoring to discuss efficacy of medications. Patient agreed to schedule a follow up visit on 3/10/21 but would like to know if he can get methotrexate refilled until then if he continues to get labs done. Forwarded to Dr. Cedillo for review.    Mert Marmolejo RN....1/13/2021 3:21 PM     "

## 2021-01-15 ENCOUNTER — HEALTH MAINTENANCE LETTER (OUTPATIENT)
Age: 76
End: 2021-01-15

## 2021-01-19 DIAGNOSIS — J44.9 CHRONIC OBSTRUCTIVE PULMONARY DISEASE, UNSPECIFIED COPD TYPE (H): ICD-10-CM

## 2021-01-19 DIAGNOSIS — M05.79 RHEUMATOID ARTHRITIS INVOLVING MULTIPLE SITES WITH POSITIVE RHEUMATOID FACTOR (H): ICD-10-CM

## 2021-01-19 NOTE — TELEPHONE ENCOUNTER
Rheumatology team: Please call to notify Mr. Thomas that methotrexate has been refilled. Labs are needed.   Niles Cedillo MD  1/18/2021 10:54 PM

## 2021-01-20 RX ORDER — FOLIC ACID 1 MG/1
TABLET ORAL
Qty: 90 TABLET | Refills: 1 | Status: SHIPPED | OUTPATIENT
Start: 2021-01-20 | End: 2021-03-10

## 2021-01-29 DIAGNOSIS — M48.062 SPINAL STENOSIS OF LUMBAR REGION WITH NEUROGENIC CLAUDICATION: ICD-10-CM

## 2021-01-29 RX ORDER — HYDROCODONE BITARTRATE AND ACETAMINOPHEN 5; 325 MG/1; MG/1
1 TABLET ORAL 3 TIMES DAILY PRN
Qty: 90 TABLET | Refills: 0 | Status: SHIPPED | OUTPATIENT
Start: 2021-01-29 | End: 2021-03-02

## 2021-01-29 NOTE — TELEPHONE ENCOUNTER
Pt calling for refill of Norco. He only has one left.    Pended    Please review and authorize if appropriate.    Thank you,   Chino SHABAZZ RN

## 2021-01-29 NOTE — TELEPHONE ENCOUNTER
HYDROcodone-acetaminophen (NORCO) 5-325 MG tablet 90 tablet 0 12/28/2020  No   Sig - Route: Take 1 tablet by mouth 3 times daily as needed for moderate to severe pain - Oral   Sent to pharmacy as: HYDROcodone-Acetaminophen 5-325 MG Oral Tablet (NORCO)   Class: E-Prescribe   Earliest Fill Date: 12/28/2020   Order: [id number]   E-Prescribing Status: Receipt confirmed by pharmacy (12/28/2020  6:25 PM CST)   Prior authorization: Closed - Prior Authorization not required for patient/medication     Last visit 6/4/2020 - virtual     Next 5 appointments (look out 90 days)    Mar 08, 2021 10:30 AM  Office Visit with Yaneli Dozier MD  [facility] ([facility]) [address] [facility] [address]  [phone]        [facility] website verification:  done on 12/28/20 GM   [email or web address]

## 2021-02-10 DIAGNOSIS — M62.830 BACK MUSCLE SPASM: ICD-10-CM

## 2021-02-12 DIAGNOSIS — M05.79 RHEUMATOID ARTHRITIS INVOLVING MULTIPLE SITES WITH POSITIVE RHEUMATOID FACTOR (H): ICD-10-CM

## 2021-02-12 DIAGNOSIS — I10 BENIGN ESSENTIAL HYPERTENSION: ICD-10-CM

## 2021-02-12 RX ORDER — CARISOPRODOL 250 MG/1
TABLET ORAL
Qty: 31 TABLET | Refills: 1 | Status: SHIPPED | OUTPATIENT
Start: 2021-02-12 | End: 2021-07-29

## 2021-02-12 NOTE — TELEPHONE ENCOUNTER
Last visit 6/4/2020 - virtual     Routing refill request to provider for review/approval because:  Drug not on the [facility] refill protocol         Carisoprodol 250 MG TABS 31 tablet 1 11/5/2020  No   Sig: TAKE 1 TABLET BY MOUTH DAILY AS NEEDED FOR BACK ISSUE   Sent to pharmacy as: Carisoprodol 250 MG Oral Tablet   Class: E-Prescribe   Notes to Pharmacy: Not to exceed 4 additional fills before 02/16/2021 DX Code Needed  .   Order: [id number]   E-Prescribing Status: Receipt confirmed by pharmacy (11/5/2020  9:38 AM CST)   Printout Tracking    External Result Report   Pharmacy    [facility]/PHARMACY #[id number] - [facility] [address] [place] [address]

## 2021-02-12 NOTE — TELEPHONE ENCOUNTER
RN: labs and follow-up are overdue. Methotrexate refill denied.     Niles Cedillo MD  2/12/2021 1:56 PM

## 2021-02-15 DIAGNOSIS — E78.5 HYPERLIPIDEMIA LDL GOAL <100: ICD-10-CM

## 2021-02-15 RX ORDER — LISINOPRIL 40 MG/1
40 TABLET ORAL DAILY
Qty: 90 TABLET | Refills: 0 | Status: SHIPPED | OUTPATIENT
Start: 2021-02-15 | End: 2021-05-18

## 2021-02-15 RX ORDER — ROSUVASTATIN CALCIUM 10 MG/1
TABLET, COATED ORAL
Qty: 90 TABLET | Refills: 3 | Status: SHIPPED | OUTPATIENT
Start: 2021-02-15 | End: 2021-07-01

## 2021-02-19 NOTE — TELEPHONE ENCOUNTER
Patient called he has an appt set up 03/10/21 is out of medication and needs to take today. Please call patient.  Dianne Jaramillo,

## 2021-02-22 NOTE — TELEPHONE ENCOUNTER
"Called and informed patient that he is due for labs. Patient stated \"I just had labs done a couple months ago.\" RN advised that labs are needed every 3 months to ensure that there is no evidence of medication toxicity. Patient verbalized understanding. Scheduled him an apt on 3/3/21. Patient has a follow up with Dr. Cedillo scheduled on 3/10/21.    Mert CASANOVA RN....2/22/2021 2:18 PM     "

## 2021-02-25 ENCOUNTER — TELEPHONE (OUTPATIENT)
Dept: FAMILY MEDICINE | Facility: CLINIC | Age: 76
End: 2021-02-25

## 2021-02-25 DIAGNOSIS — E78.5 HYPERLIPIDEMIA LDL GOAL <100: Primary | ICD-10-CM

## 2021-02-25 NOTE — TELEPHONE ENCOUNTER
Patient has lab appointment on 03/03/2021 at 1015 am for Rheumatology labs and is wondering if he can get his physical labs done at this time as well. Please advise.    Nestor Leblanc CMA on 2/25/2021 at 11:48 AM

## 2021-02-25 NOTE — TELEPHONE ENCOUNTER
Reason for Call:  Other Orders    Detailed comments: Pt is coming in on the 03/03/21 for labs for rheumatology and he is wondering if he can get his lab work done for his annual wellness check done at the same time Thank you.    Phone Number Patient can be reached at: Cell number on file:    Telephone Information:   Mobile [phone]       Best Time: Any    Can we leave a detailed message on this number? YES    Call taken on 2/25/2021 at 11:46 AM by Lita Bennett

## 2021-03-01 DIAGNOSIS — M48.062 SPINAL STENOSIS OF LUMBAR REGION WITH NEUROGENIC CLAUDICATION: ICD-10-CM

## 2021-03-01 NOTE — TELEPHONE ENCOUNTER
Controlled Substance Refill Request for Norco  Problem List Complete:    Yes    Last Written Prescription Date:  01/29/21  Last Fill Quantity: 90 ,   # refills: 0    THE MOST RECENT OFFICE VISIT MUST BE WITHIN THE PAST 3 MONTHS. AT LEAST ONE FACE TO FACE VISIT MUST OCCUR EVERY 6 MONTHS. ADDITIONAL VISITS CAN BE VIRTUAL.  (THIS STATEMENT SHOULD BE DELETED.)    Last Office Visit with [facility] primary care provider: 06/04/2020    Future Office visit:   Next 5 appointments (look out 90 days)    Mar 08, 2021 10:30 AM  Office Visit with Yaneli Dozier MD  [facility] [address] [facility] [address]  [phone]          Controlled substance agreement:   Encounter-Level CSA - 05/26/2017:    Controlled Substance Agreement - Scan on 6/6/2017  3:48 PM: CONTROLLED SUBSTANCE AGREEMENT     Encounter-Level CSA - 11/14/2016:    Controlled Substance Agreement - Scan on 11/18/2016  7:45 AM: CONTROLLED SUBSTANCE AGREEMENT     Patient-Level CSA:    There are no patient-level csa.         Last Urine Drug Screen: No results found for: CDAUT, No results found for: COMDAT,   Cannabinoids (29-nwv-2-carboxy-9-THC)   Date Value Ref Range Status   02/03/2020 Not Detected NDET^Not Detected ng/mL Final     Comment:     Cutoff for a negative cannabinoid is 50 ng/mL or less.     Phencyclidine (Phencyclidine)   Date Value Ref Range Status   02/03/2020 Not Detected NDET^Not Detected ng/mL Final     Comment:     Cutoff for a negative PCP is 25 ng/mL or less.     Cocaine (Benzoylecgonine)   Date Value Ref Range Status   02/03/2020 Not Detected NDET^Not Detected ng/mL Final     Comment:     Cutoff for a negative cocaine is 150 ng/ml or less.     Methamphetamine (d-Methamphetamine)   Date Value Ref Range Status   02/03/2020 Not Detected NDET^Not Detected ng/mL Final     Comment:     Cutoff for a negative methamphetamine is 500 ng/ml or less.     Opiates (Morphine)   Date Value Ref Range  Status   02/03/2020 Not Detected NDET^Not Detected ng/mL Final     Comment:     Cutoff for a negative opiate is 100 ng/ml or less.     Amphetamine (d-Amphetamine)   Date Value Ref Range Status   02/03/2020 Not Detected NDET^Not Detected ng/mL Final     Comment:     Cutoff for a negative amphetamine is 500 ng/mL or less.     Benzodiazepines (Nordiazepam)   Date Value Ref Range Status   02/03/2020 Not Detected NDET^Not Detected ng/mL Final     Comment:     Cutoff for a negative benzodiazepine is 150 ng/ml or less.     Tricyclic Antidepressants (Desipramine)   Date Value Ref Range Status   02/03/2020 Not Detected NDET^Not Detected ng/mL Final     Comment:     Cutoff for a negative tricyclic antidepressant is 300 ng/ml or less.     Methadone (Methadone)   Date Value Ref Range Status   02/03/2020 Not Detected NDET^Not Detected ng/mL Final     Comment:     Cutoff for a negative methadone is 200 ng/ml or less.     Barbiturates (Butalbital)   Date Value Ref Range Status   02/03/2020 Not Detected NDET^Not Detected ng/mL Final     Comment:     Cutoff for a negative barbituate is 200 ng/ml or less.     Oxycodone (Oxycodone)   Date Value Ref Range Status   02/03/2020 Not Detected NDET^Not Detected ng/mL Final     Comment:     Cutoff for a negative Oxycodone is 100 ng/mL or less.     Propoxyphene (Norpropoxyphene)   Date Value Ref Range Status   02/03/2020 Not Detected NDET^Not Detected ng/mL Final     Comment:     Cutoff for a negative propoxyphene is 300 ng/ml or less     Buprenorphine (Buprenorphine)   Date Value Ref Range Status   02/03/2020 Not Detected NDET^Not Detected ng/mL Final     Comment:     Cutoff for a negative buprenorphine is 10 ng/ml or less        Processing:  Rx to be electronically transmitted to pharmacy by provider     [email or web address]   checked in past 3 months?  Yes 12/28/2020    Bita Hernandez MA

## 2021-03-01 NOTE — TELEPHONE ENCOUNTER
Reason for Call:  Medication or medication refill:    Do you use a [facility]?  Name of the pharmacy and phone number for the current request:    [facility]/PHARMACY #[id number] - [place], [place] - [address] [place] [address]  Name of the medication requested: HYDROcodone-acetaminophen (NORCO) 5-325 MG tablet       Other request: na    Can we leave a detailed message on this number? YES    Phone number patient can be reached at: Cell number on file:    Telephone Information:   Mobile [phone]       Best Time: any    Call taken on 3/1/2021 at 10:06 AM by Andrez Abdi

## 2021-03-02 RX ORDER — HYDROCODONE BITARTRATE AND ACETAMINOPHEN 5; 325 MG/1; MG/1
1 TABLET ORAL 3 TIMES DAILY PRN
Qty: 90 TABLET | Refills: 0 | Status: SHIPPED | OUTPATIENT
Start: 2021-03-02 | End: 2021-04-01

## 2021-03-03 DIAGNOSIS — M05.79 RHEUMATOID ARTHRITIS INVOLVING MULTIPLE SITES WITH POSITIVE RHEUMATOID FACTOR (H): ICD-10-CM

## 2021-03-03 DIAGNOSIS — Z79.899 HIGH RISK MEDICATIONS (NOT ANTICOAGULANTS) LONG-TERM USE: ICD-10-CM

## 2021-03-03 DIAGNOSIS — E78.5 HYPERLIPIDEMIA LDL GOAL <100: ICD-10-CM

## 2021-03-03 LAB
ALBUMIN SERPL-MCNC: 3.6 G/DL (ref 3.4–5)
ALP SERPL-CCNC: 91 U/L (ref 40–150)
ALT SERPL W P-5'-P-CCNC: 27 U/L (ref 0–70)
AST SERPL W P-5'-P-CCNC: 12 U/L (ref 0–45)
BASOPHILS # BLD AUTO: 0.1 10E9/L (ref 0–0.2)
BASOPHILS NFR BLD AUTO: 0.7 %
BILIRUB DIRECT SERPL-MCNC: 0.1 MG/DL (ref 0–0.2)
BILIRUB SERPL-MCNC: 0.4 MG/DL (ref 0.2–1.3)
CHOLEST SERPL-MCNC: 150 MG/DL
CREAT SERPL-MCNC: 0.98 MG/DL (ref 0.66–1.25)
CRP SERPL-MCNC: 3.2 MG/L (ref 0–8)
DIFFERENTIAL METHOD BLD: NORMAL
EOSINOPHIL # BLD AUTO: 0.4 10E9/L (ref 0–0.7)
EOSINOPHIL NFR BLD AUTO: 5.4 %
ERYTHROCYTE [DISTWIDTH] IN BLOOD BY AUTOMATED COUNT: 13.9 % (ref 10–15)
ERYTHROCYTE [SEDIMENTATION RATE] IN BLOOD BY WESTERGREN METHOD: 8 MM/H (ref 0–20)
GFR SERPL CREATININE-BSD FRML MDRD: 76 ML/MIN/{1.73_M2}
GLUCOSE SERPL-MCNC: 94 MG/DL (ref 70–99)
HCT VFR BLD AUTO: 46.2 % (ref 40–53)
HDLC SERPL-MCNC: 51 MG/DL
HGB BLD-MCNC: 15.2 G/DL (ref 13.3–17.7)
LDLC SERPL CALC-MCNC: 83 MG/DL
LYMPHOCYTES # BLD AUTO: 1.5 10E9/L (ref 0.8–5.3)
LYMPHOCYTES NFR BLD AUTO: 21.6 %
MCH RBC QN AUTO: 31.1 PG (ref 26.5–33)
MCHC RBC AUTO-ENTMCNC: 32.9 G/DL (ref 31.5–36.5)
MCV RBC AUTO: 95 FL (ref 78–100)
MONOCYTES # BLD AUTO: 0.9 10E9/L (ref 0–1.3)
MONOCYTES NFR BLD AUTO: 12 %
NEUTROPHILS # BLD AUTO: 4.3 10E9/L (ref 1.6–8.3)
NEUTROPHILS NFR BLD AUTO: 60.3 %
NONHDLC SERPL-MCNC: 99 MG/DL
PLATELET # BLD AUTO: 198 10E9/L (ref 150–450)
PROT SERPL-MCNC: 7.1 G/DL (ref 6.8–8.8)
RBC # BLD AUTO: 4.89 10E12/L (ref 4.4–5.9)
TRIGL SERPL-MCNC: 81 MG/DL
WBC # BLD AUTO: 7.1 10E9/L (ref 4–11)

## 2021-03-03 PROCEDURE — 85652 RBC SED RATE AUTOMATED: CPT | Performed by: INTERNAL MEDICINE

## 2021-03-03 PROCEDURE — 36415 COLL VENOUS BLD VENIPUNCTURE: CPT | Performed by: INTERNAL MEDICINE

## 2021-03-03 PROCEDURE — 80076 HEPATIC FUNCTION PANEL: CPT | Performed by: INTERNAL MEDICINE

## 2021-03-03 PROCEDURE — 86140 C-REACTIVE PROTEIN: CPT | Performed by: INTERNAL MEDICINE

## 2021-03-03 PROCEDURE — 82565 ASSAY OF CREATININE: CPT | Performed by: INTERNAL MEDICINE

## 2021-03-03 PROCEDURE — 85025 COMPLETE CBC W/AUTO DIFF WBC: CPT | Performed by: INTERNAL MEDICINE

## 2021-03-03 PROCEDURE — 82947 ASSAY GLUCOSE BLOOD QUANT: CPT | Performed by: INTERNAL MEDICINE

## 2021-03-03 PROCEDURE — 80061 LIPID PANEL: CPT | Performed by: INTERNAL MEDICINE

## 2021-03-08 ENCOUNTER — OFFICE VISIT (OUTPATIENT)
Dept: FAMILY MEDICINE | Facility: CLINIC | Age: 76
End: 2021-03-08
Payer: COMMERCIAL

## 2021-03-08 VITALS
TEMPERATURE: 97.1 F | SYSTOLIC BLOOD PRESSURE: 151 MMHG | DIASTOLIC BLOOD PRESSURE: 73 MMHG | HEIGHT: 69 IN | OXYGEN SATURATION: 93 % | HEART RATE: 47 BPM | BODY MASS INDEX: 26.36 KG/M2 | WEIGHT: 178 LBS

## 2021-03-08 DIAGNOSIS — R60.9 EDEMA, UNSPECIFIED TYPE: ICD-10-CM

## 2021-03-08 DIAGNOSIS — E04.1 THYROID NODULE: ICD-10-CM

## 2021-03-08 DIAGNOSIS — Z00.00 ENCOUNTER FOR ANNUAL WELLNESS VISIT (AWV) IN MEDICARE PATIENT: Primary | ICD-10-CM

## 2021-03-08 DIAGNOSIS — G89.29 OTHER CHRONIC PAIN: ICD-10-CM

## 2021-03-08 DIAGNOSIS — N40.1 BENIGN PROSTATIC HYPERPLASIA WITH INCOMPLETE BLADDER EMPTYING: ICD-10-CM

## 2021-03-08 DIAGNOSIS — Z12.5 SCREENING FOR PROSTATE CANCER: ICD-10-CM

## 2021-03-08 DIAGNOSIS — R39.14 BENIGN PROSTATIC HYPERPLASIA WITH INCOMPLETE BLADDER EMPTYING: ICD-10-CM

## 2021-03-08 DIAGNOSIS — M48.062 SPINAL STENOSIS OF LUMBAR REGION WITH NEUROGENIC CLAUDICATION: ICD-10-CM

## 2021-03-08 DIAGNOSIS — J44.1 CHRONIC OBSTRUCTIVE PULMONARY DISEASE WITH ACUTE EXACERBATION (H): ICD-10-CM

## 2021-03-08 DIAGNOSIS — I10 BENIGN ESSENTIAL HYPERTENSION: ICD-10-CM

## 2021-03-08 DIAGNOSIS — D84.9 IMMUNODEFICIENCY (H): ICD-10-CM

## 2021-03-08 DIAGNOSIS — I25.10 CORONARY ARTERY DISEASE INVOLVING NATIVE CORONARY ARTERY OF NATIVE HEART WITHOUT ANGINA PECTORIS: ICD-10-CM

## 2021-03-08 DIAGNOSIS — M05.79 RHEUMATOID ARTHRITIS INVOLVING MULTIPLE SITES WITH POSITIVE RHEUMATOID FACTOR (H): ICD-10-CM

## 2021-03-08 LAB

## 2021-03-08 PROCEDURE — G0103 PSA SCREENING: HCPCS | Performed by: INTERNAL MEDICINE

## 2021-03-08 PROCEDURE — G0439 PPPS, SUBSEQ VISIT: HCPCS | Performed by: INTERNAL MEDICINE

## 2021-03-08 PROCEDURE — 36415 COLL VENOUS BLD VENIPUNCTURE: CPT | Performed by: INTERNAL MEDICINE

## 2021-03-08 PROCEDURE — 80306 DRUG TEST PRSMV INSTRMNT: CPT | Performed by: INTERNAL MEDICINE

## 2021-03-08 PROCEDURE — 84443 ASSAY THYROID STIM HORMONE: CPT | Performed by: INTERNAL MEDICINE

## 2021-03-08 RX ORDER — FUROSEMIDE 20 MG
10 TABLET ORAL DAILY
Qty: 45 TABLET | Refills: 3 | Status: SHIPPED | OUTPATIENT
Start: 2021-03-08 | End: 2021-04-29

## 2021-03-08 RX ORDER — METOPROLOL SUCCINATE 50 MG/1
50 TABLET, EXTENDED RELEASE ORAL EVERY EVENING
Qty: 90 TABLET | Refills: 2 | COMMUNITY
Start: 2021-03-08 | End: 2021-08-09

## 2021-03-08 RX ORDER — NITROGLYCERIN 0.4 MG/1
TABLET SUBLINGUAL
Qty: 21 TABLET | Refills: 0 | Status: SHIPPED | OUTPATIENT
Start: 2021-03-08 | End: 2021-11-09

## 2021-03-08 ASSESSMENT — ACTIVITIES OF DAILY LIVING (ADL): CURRENT_FUNCTION: NO ASSISTANCE NEEDED

## 2021-03-08 ASSESSMENT — PATIENT HEALTH QUESTIONNAIRE - PHQ9
5. POOR APPETITE OR OVEREATING: SEVERAL DAYS
SUM OF ALL RESPONSES TO PHQ QUESTIONS 1-9: 6

## 2021-03-08 ASSESSMENT — MIFFLIN-ST. JEOR: SCORE: 1538.01

## 2021-03-08 ASSESSMENT — ANXIETY QUESTIONNAIRES
GAD7 TOTAL SCORE: 3
6. BECOMING EASILY ANNOYED OR IRRITABLE: SEVERAL DAYS
7. FEELING AFRAID AS IF SOMETHING AWFUL MIGHT HAPPEN: NOT AT ALL
2. NOT BEING ABLE TO STOP OR CONTROL WORRYING: NOT AT ALL
3. WORRYING TOO MUCH ABOUT DIFFERENT THINGS: NOT AT ALL
5. BEING SO RESTLESS THAT IT IS HARD TO SIT STILL: SEVERAL DAYS
1. FEELING NERVOUS, ANXIOUS, OR ON EDGE: NOT AT ALL
IF YOU CHECKED OFF ANY PROBLEMS ON THIS QUESTIONNAIRE, HOW DIFFICULT HAVE THESE PROBLEMS MADE IT FOR YOU TO DO YOUR WORK, TAKE CARE OF THINGS AT HOME, OR GET ALONG WITH OTHER PEOPLE: NOT DIFFICULT AT ALL
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
## 2021-03-09 LAB
PSA SERPL-ACNC: 0.46 UG/L (ref 0–4)
TSH SERPL DL<=0.005 MIU/L-ACNC: 0.9 MU/L (ref 0.4–4)

## 2021-03-09 ASSESSMENT — ANXIETY QUESTIONNAIRES: GAD7 TOTAL SCORE: 3

## 2021-03-09 NOTE — PATIENT INSTRUCTIONS
RHEUMATOLOGY    Dr. Niles Cedillo    [facility]  [address] [place] [address]    Our new phone number for Rheumatology is [phone], this number will be able to help you schedule appointments for Dr. Cedillo or if you have any message you would like sent to us.    Thank you for choosing [facility]!    Kinjal Quinn [facility] Rheumatology
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
## 2021-03-10 ENCOUNTER — VIRTUAL VISIT (OUTPATIENT)
Dept: RHEUMATOLOGY | Facility: CLINIC | Age: 76
End: 2021-03-10
Payer: COMMERCIAL

## 2021-03-10 DIAGNOSIS — M85.80 OSTEOPENIA, UNSPECIFIED LOCATION: ICD-10-CM

## 2021-03-10 DIAGNOSIS — Z79.83 LONG TERM USE OF BISPHOSPHONATES: ICD-10-CM

## 2021-03-10 DIAGNOSIS — M05.79 RHEUMATOID ARTHRITIS INVOLVING MULTIPLE SITES WITH POSITIVE RHEUMATOID FACTOR (H): Primary | ICD-10-CM

## 2021-03-10 DIAGNOSIS — Z79.899 HIGH RISK MEDICATIONS (NOT ANTICOAGULANTS) LONG-TERM USE: ICD-10-CM

## 2021-03-10 PROCEDURE — 99214 OFFICE O/P EST MOD 30 MIN: CPT | Mod: 95 | Performed by: INTERNAL MEDICINE

## 2021-03-10 RX ORDER — FOLIC ACID 1 MG/1
2 TABLET ORAL DAILY
Qty: 180 TABLET | Refills: 1 | Status: SHIPPED | OUTPATIENT
Start: 2021-03-10 | End: 2021-08-04

## 2021-03-10 RX ORDER — ALENDRONATE SODIUM 70 MG/1
70 TABLET ORAL
Qty: 12 TABLET | Refills: 3 | Status: SHIPPED | OUTPATIENT
Start: 2021-03-10 | End: 2021-12-01

## 2021-03-25 ENCOUNTER — TELEPHONE (OUTPATIENT)
Dept: FAMILY MEDICINE | Facility: CLINIC | Age: 76
End: 2021-03-25

## 2021-03-25 ENCOUNTER — MYC MEDICAL ADVICE (OUTPATIENT)
Dept: FAMILY MEDICINE | Facility: CLINIC | Age: 76
End: 2021-03-25

## 2021-03-25 NOTE — TELEPHONE ENCOUNTER
I called patient, he is wondering why he is having to pay for preventative labs?  Sending this to Vida in coding to see if she might know.     Bita Hernandez MA

## 2021-03-25 NOTE — TELEPHONE ENCOUNTER
I reviewed the progress note from patient's appointment which was 3/8/21.  He was here for a physical and it says the split bill script was read to him, but it also appears that the patient was probably charged for the HTN and vascular disease f/u's.  Please advise.     Bita Hernandez MA

## 2021-03-25 NOTE — TELEPHONE ENCOUNTER
Reason for Call:  Other     Detailed comments: patient would like a call. He already spoke with billing. He would like to discuss how the provider coded his visit on 3-8    Phone Number Patient can be reached at: Cell number on file:  [phone]  Telephone Information:   Mobile [phone]       Best Time: any    Can we leave a detailed message on this number? YES    Call taken on 3/25/2021 at 12:18 PM by Priscilla Hernandez

## 2021-03-29 NOTE — TELEPHONE ENCOUNTER
Vida in coding got back to me and I called and explained the charges to Harrison.  He is ok with it, now that he knows.   Bita Hernandez MA

## 2021-04-01 ENCOUNTER — TELEPHONE (OUTPATIENT)
Dept: FAMILY MEDICINE | Facility: CLINIC | Age: 76
End: 2021-04-01

## 2021-04-01 DIAGNOSIS — M48.062 SPINAL STENOSIS OF LUMBAR REGION WITH NEUROGENIC CLAUDICATION: ICD-10-CM

## 2021-04-01 RX ORDER — HYDROCODONE BITARTRATE AND ACETAMINOPHEN 5; 325 MG/1; MG/1
1 TABLET ORAL 3 TIMES DAILY PRN
Qty: 90 TABLET | Refills: 0 | Status: SHIPPED | OUTPATIENT
Start: 2021-04-01 | End: 2021-05-03

## 2021-04-01 NOTE — TELEPHONE ENCOUNTER
Reason for Call:  Medication or medication refill:    Do you use a [facility]?  Name of the pharmacy and phone number for the current request:  [facility]    Name of the medication requested: Norco    Other request: none    Can we leave a detailed message on this number? YES    Phone number patient can be reached at: Cell number on file:    Telephone Information:   Mobile [phone]       Best Time: any    Call taken on 4/1/2021 at 11:24 AM by Carly Pitts

## 2021-04-01 NOTE — TELEPHONE ENCOUNTER
Routing refill request to provider for review/approval because:  Drug not on the [facility] refill protocol   Norco  Last Written Prescription Date:  3-2-21  Last Fill Quantity: 90,  # refills: 0   Last appointment  3-8-21 with prescribing provider:  Dr Tasia CAMEJO RN,BSN

## 2021-04-15 ENCOUNTER — TRANSFERRED RECORDS (OUTPATIENT)
Dept: HEALTH INFORMATION MANAGEMENT | Facility: CLINIC | Age: 76
End: 2021-04-15

## 2021-04-15 ENCOUNTER — TELEPHONE (OUTPATIENT)
Dept: FAMILY MEDICINE | Facility: CLINIC | Age: 76
End: 2021-04-15

## 2021-04-15 DIAGNOSIS — R60.9 EDEMA, UNSPECIFIED TYPE: Primary | ICD-10-CM

## 2021-04-15 RX ORDER — FUROSEMIDE 20 MG
20 TABLET ORAL DAILY
Qty: 31 TABLET | Refills: 3 | Status: SHIPPED | OUTPATIENT
Start: 2021-04-15 | End: 2021-06-03

## 2021-04-15 NOTE — TELEPHONE ENCOUNTER
States he has been on 20 mg Furosemide for a LONG time. Some increased bilateral ankle edema; weight stable.  Thought he could use an INCREASE in Lasix. Just picked up Rx that was for 0.5 tablet (10 mg) (instead of his usual 20 mg)     Per PCP note 3/8/21:    Benign essential hypertension  -     furosemide (LASIX) 20 MG tablet; Take 0.5 tablets (10 mg) by mouth daily  We will reduce the dose of Toprol to 50 because of bradycardia and add 10 mg of Lasix per day.    1) Should he continue 20 mg? (RN can send corrected RX to pharmacy)   2) when should he see you for follow up? (last OV 3/8/21)    Thank you,  Niurka Amin RN on 4/15/2021 at 12:35 PM

## 2021-04-22 LAB
CREAT SERPL-MCNC: 0.63 MG/DL (ref 0.7–1.3)
GFR SERPL CREATININE-BSD FRML MDRD: >60 ML/MIN
GLUCOSE SERPL-MCNC: 86 MG/DL (ref 74–106)
POTASSIUM SERPL-SCNC: 4 MMOL/L (ref 3.5–5.1)

## 2021-04-24 ENCOUNTER — NURSE TRIAGE (OUTPATIENT)
Dept: NURSING | Facility: CLINIC | Age: 76
End: 2021-04-24

## 2021-04-24 DIAGNOSIS — Z96.642 AFTERCARE FOLLOWING LEFT HIP JOINT REPLACEMENT SURGERY: Primary | ICD-10-CM

## 2021-04-24 DIAGNOSIS — Z47.1 AFTERCARE FOLLOWING LEFT HIP JOINT REPLACEMENT SURGERY: Primary | ICD-10-CM

## 2021-04-24 DIAGNOSIS — M48.062 SPINAL STENOSIS OF LUMBAR REGION WITH NEUROGENIC CLAUDICATION: ICD-10-CM

## 2021-04-24 NOTE — TELEPHONE ENCOUNTER
Triage Call:    OFZIA on file for wife Meenu Snyder, had surgery at [facility] on 4/16/21 on hip.  Currently in TCU, thinking about walking out of the TCU. They are doing PT in the room, only doing it once a day.    Wife has concerns about not having supplies needed at the TCU, not being able to fit the wheelchair through the bathroom door at the the TCU.   Trouble getting his pain medications on the first day.    Has been in contact with the charge nurse regarding care. RN recommended speaking to the charge nurse about concerns of care.     Looking for order for outpatient rehab at [facility] in [facility].  Wife is okay waiting until Monday to have PCP review and advise. Wife would prefer to have PCP review and address request.      Please advise on request for rehab orders for patient to start out patient rehab so wife can bring Harrison home from TCU.  Please contact wife with update.     Pinky Finch RN 04/24/21 10:29 AM  [facility] Nurse Advisor    COVID 19 Nurse Triage Plan/Patient Instructions    Please be aware that novel coronavirus (COVID-19) may be circulating in the community. If you develop symptoms such as fever, cough, or SOB or if you have concerns about the presence of another infection including coronavirus (COVID-19), please contact your health care provider or visit [email or web address].     Disposition/Instructions    Home care recommended. Follow home care protocol based instructions.    Thank you for taking steps to prevent the spread of this virus.  o Limit your contact with others.  o Wear a simple mask to cover your cough.  o Wash your hands well and often.    Resources    [facility]: About COVID-19: [email or web address]    CDC: What to Do If You're Sick: www.cdc.gov/coronavirus/2019-ncov/about/steps-when-sick.html    CDC: Ending Home Isolation: www.cdc.gov/coronavirus/2019-ncov/hcp/disposition-in-home-patients.html     CDC: Caring for  Someone: www.cdc.gov/coronavirus/2019-ncov/if-you-are-sick/care-for-someone.html     [facility]: Interim Guidance for Hospital Discharge to Home: www.health.[facility].[place].us/diseases/coronavirus/hcp/hospdischarge.pdf    [facility] clinical trials (COVID-19 research studies): clinicalaffairs.[facility]/umn-clinical-trials     Below are the COVID-19 hotlines at the [place] Department of [facility] ([facility]). Interpreters are available.   o For health questions: Call 595-036-3910 or 1-874.957.2974 (7 a.m. to 7 p.m.)  o For questions about schools and childcare: Call 567-233-8724 or 1-142.960.5745 (7 a.m. to 7 p.m.)                   Reason for Disposition    [1] Follow-up call from patient regarding patient's clinical status AND [2] information NON-URGENT    Additional Information    Negative: Lab or radiology calling with test results    Negative: Caller requesting lab results    Negative: ED call to PCP    Negative: Physician call to PCP    Negative: Call about patient who is currently hospitalized    Negative: Lab or radiology calling with CRITICAL test results    Negative: [1] Prescription not at pharmacy AND [2] was prescribed today by PCP    Negative: [1] Follow-up call from patient regarding patient's clinical status AND [2] information urgent    Negative: [1] Caller requests to speak ONLY to PCP AND [2] urgent question    Negative: [1] Caller requests to speak to PCP now AND [2] won't tell us reason for call  (Exception: if 10 pm to 6 am, caller must first discuss reason for the call)    Negative: Notification of hospital admission    Negative: Notification of death    Negative: Lab calling with strep throat test results and triager can call in prescription    Negative: Lab calling with urinalysis test results and triager can call in prescription    Negative: Medication questions    Protocols used: [facility] - [facility]

## 2021-04-26 NOTE — TELEPHONE ENCOUNTER
Left message for Adeola to let us know when pt transitioning out of TCU and we can place continuing orders/have a hosp follow up/etc, call if more questions.       Nora Irwin, RN  [facility] RN Triage Team

## 2021-05-03 RX ORDER — HYDROCODONE BITARTRATE AND ACETAMINOPHEN 5; 325 MG/1; MG/1
1 TABLET ORAL 3 TIMES DAILY PRN
Qty: 90 TABLET | Refills: 0 | Status: SHIPPED | OUTPATIENT
Start: 2021-05-03 | End: 2021-06-03

## 2021-05-03 NOTE — TELEPHONE ENCOUNTER
Called back to Adeola and patient     Scheduled hospital follow up visit on Friday     They will call [facility] for records transferred and questions about bandaging and PT     Pt asking for refill on his Norco     HYDROcodone-acetaminophen (NORCO) 5-325 MG tablet 90 tablet 0 4/1/2021  No   Sig - Route: Take 1 tablet by mouth 3 times daily as needed for moderate to severe pain - Oral   Sent to pharmacy as: HYDROcodone-Acetaminophen 5-325 MG Oral Tablet (NORCO)   Class: E-Prescribe   Earliest Fill Date: 4/1/2021   Order: [id number]   E-Prescribing Status: Receipt confirmed by pharmacy (4/1/2021  2:34 PM CDT)   Prior authorization: Closed - Prior Authorization not required for patient/medication       Zaria HAYS RN

## 2021-05-07 ENCOUNTER — VIRTUAL VISIT (OUTPATIENT)
Dept: FAMILY MEDICINE | Facility: CLINIC | Age: 76
End: 2021-05-07
Payer: COMMERCIAL

## 2021-05-07 DIAGNOSIS — R06.02 SOB (SHORTNESS OF BREATH): ICD-10-CM

## 2021-05-07 DIAGNOSIS — R91.8 PULMONARY NODULES: ICD-10-CM

## 2021-05-07 DIAGNOSIS — M79.89 LEG SWELLING: ICD-10-CM

## 2021-05-07 DIAGNOSIS — J44.1 CHRONIC OBSTRUCTIVE PULMONARY DISEASE WITH ACUTE EXACERBATION (H): ICD-10-CM

## 2021-05-07 DIAGNOSIS — M05.79 RHEUMATOID ARTHRITIS INVOLVING MULTIPLE SITES WITH POSITIVE RHEUMATOID FACTOR (H): ICD-10-CM

## 2021-05-07 DIAGNOSIS — E05.90 THYROTOXICOSIS WITHOUT THYROID STORM, UNSPECIFIED THYROTOXICOSIS TYPE: ICD-10-CM

## 2021-05-07 DIAGNOSIS — S72.002D CLOSED DISPLACED FRACTURE OF LEFT FEMORAL NECK WITH ROUTINE HEALING: Primary | ICD-10-CM

## 2021-05-07 PROCEDURE — 99214 OFFICE O/P EST MOD 30 MIN: CPT | Mod: 95 | Performed by: INTERNAL MEDICINE

## 2021-05-07 NOTE — PROGRESS NOTES
Harrison is a 75 year old who is being evaluated via a billable video visit.      How would you like to obtain your AVS? MyChart  If the video visit is dropped, the invitation should be resent by: Text to cell phone: [phone] - dox   Will anyone else be joining your video visit? No    Video Start Time: 3.00      ICD-10-CM    1. Closed displaced fracture of left femoral neck with routine healing  S72.002D    2. Rheumatoid arthritis involving multiple sites with positive rheumatoid factor (H)  M05.79    3. Leg swelling  M79.89    4. SOB (shortness of breath)  R06.02    5. Chronic obstructive pulmonary disease with acute exacerbation (H)  J44.1    6. 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, unspecified thyrotoxicosis type  E05.90      Patient was admitted to [facility] after he fell  He was found to have left-sided hip fracture at femoral neck  He underwent left hip hemiarthroplasty  Virtual visit is not enough to evaluate his wound and also his connection was in proper  He does have some leg swelling but seems to be expected  He also had atypical pneumonia and COPD flareup and has improved but still is short of breath and coughing  I need to evaluate him  It will be arranged early Monday morning  However if he feels worse over the weekend he should go to ER  In addition I discussed with patient and his wife Taylor about drinking  Patient normally does not drink but the day he fell he had been to a bar  He takes opioids and I discussed about the interaction with alcohol  He understands and will continue with current medications and I will follow up  He is immune compromised because of rheumatoid arthritis and also continues to take methimazole for thyrotoxicosis      Subjective   Harrison is a 75 year old who presents for the following health issues     HPI     On April 15, patient went to a bar with his friends  As he came out, he slipped and fell off the sidewalk and broke his left hip  He was also thought to  have atypical pneumonia  And he was thought to have abnormal mediastinal area on the chest x-ray  This may be chronic related to his rheumatoid arthritis but needs to be followed up  He is now home and improving  His legs are swollen in his hip wound seems to be doing well  He still short of breath and coughing  He is not on physical therapy now  Hospital Follow-up Visit:    Hospital/Nursing Home/IP Rehab Facility: [facility]   Date of Admission: 4/15/2021  Date of Discharge: 4/20/2021  Reason(s) for Admission:     Mechanical fall  Left femoral neck fracture  S/P left hip hemiarthroplasty 4/16/2021  Shortness of breath  Atypical pneumonia  COPD  Acute on chronic hypoxic respiratory failure, likely 2/2 COPD  ETOH intoxication, resolved  Mediastinal abnormality on CXR, chronic  Rheumatoid arthritis  Osteoporosis  Hyperthyroidism  BRENNEN  CAD  H/x of MI in 2014  Hyperlipidemia  Chronic opioid use    Was your hospitalization related to COVID-19? No   Problems taking medications regularly:  None  Medication changes since discharge: None  Problems adhering to non-medication therapy:  None    Summary of hospitalization:  See outside records, reviewed and scanned  Diagnostic Tests/Treatments reviewed.  Follow up needed: none  Other Healthcare Providers Involved in Patient s Care:         None  Update since discharge: improved.       Post Discharge Medication Reconciliation: discharge medications reconciled, continue medications without change.  Plan of care communicated with patient and family                  Review of Systems   10 point ROS of systems including Constitutional, Eyes, Respiratory, Cardiovascular, Gastroenterology, Genitourinary, Integumentary, Muscularskeletal, Psychiatric were all negative except for pertinent positives noted in my HPI.        Objective    Vitals - Patient Reported  Systolic (Patient Reported): (!) 140  Diastolic (Patient Reported): 70  Weight (Patient Reported): 81.6 kg (180  lb)      Vitals:  No vitals were obtained today due to virtual visit.    Physical Exam   GENERAL: Healthy, alert and no distress  EYES: Eyes grossly normal to inspection.  No discharge or erythema, or obvious scleral/conjunctival abnormalities.  RESP: No audible wheeze, cough, or visible cyanosis.  No visible retractions or increased work of breathing.    SKIN: Visible skin clear. No significant rash, abnormal pigmentation or lesions.  NEURO: Cranial nerves grossly intact.  Mentation and speech appropriate for age.  PSYCH: Mentation appears normal, affect normal/bright, judgement and insight intact, normal speech and appearance well-groomed.    Wound seems to be healing well on the left hip    Lab Results   Component Value Date    HGB 15.2 03/03/2021     Repeat hemoglobin is 11.2 upon discharge        Video-Visit Details    Type of service:  Video Visit    Video End Time:3.25    Originating Location (pt. Location): Home    Distant Location (provider location):  [facility]     Platform used for Video Visit: MicheleREAL SAMURAI

## 2021-05-10 ENCOUNTER — TELEPHONE (OUTPATIENT)
Dept: FAMILY MEDICINE | Facility: CLINIC | Age: 76
End: 2021-05-10

## 2021-05-10 ENCOUNTER — HOSPITAL ENCOUNTER (OUTPATIENT)
Dept: ULTRASOUND IMAGING | Facility: CLINIC | Age: 76
End: 2021-05-10
Attending: INTERNAL MEDICINE
Payer: COMMERCIAL

## 2021-05-10 ENCOUNTER — OFFICE VISIT (OUTPATIENT)
Dept: FAMILY MEDICINE | Facility: CLINIC | Age: 76
End: 2021-05-10
Payer: COMMERCIAL

## 2021-05-10 ENCOUNTER — HOSPITAL ENCOUNTER (OUTPATIENT)
Dept: GENERAL RADIOLOGY | Facility: CLINIC | Age: 76
End: 2021-05-10
Attending: INTERNAL MEDICINE
Payer: COMMERCIAL

## 2021-05-10 VITALS
TEMPERATURE: 96.6 F | WEIGHT: 174 LBS | OXYGEN SATURATION: 98 % | BODY MASS INDEX: 25.77 KG/M2 | HEART RATE: 60 BPM | SYSTOLIC BLOOD PRESSURE: 132 MMHG | DIASTOLIC BLOOD PRESSURE: 66 MMHG | HEIGHT: 69 IN

## 2021-05-10 DIAGNOSIS — S29.9XXA TRAUMATIC INJURY OF RIB: ICD-10-CM

## 2021-05-10 DIAGNOSIS — Z87.81 S/P LEFT HIP FRACTURE: Primary | ICD-10-CM

## 2021-05-10 DIAGNOSIS — M79.89 LEFT LEG SWELLING: ICD-10-CM

## 2021-05-10 DIAGNOSIS — R07.9 CHEST PAIN, UNSPECIFIED TYPE: ICD-10-CM

## 2021-05-10 DIAGNOSIS — D84.9 IMMUNODEFICIENCY (H): ICD-10-CM

## 2021-05-10 DIAGNOSIS — J44.1 OBSTRUCTIVE CHRONIC BRONCHITIS WITH EXACERBATION (H): ICD-10-CM

## 2021-05-10 DIAGNOSIS — I10 BENIGN ESSENTIAL HYPERTENSION: ICD-10-CM

## 2021-05-10 LAB
ANION GAP SERPL CALCULATED.3IONS-SCNC: 3 MMOL/L (ref 3–14)
BUN SERPL-MCNC: 17 MG/DL (ref 7–30)
CALCIUM SERPL-MCNC: 9.6 MG/DL (ref 8.5–10.1)
CHLORIDE SERPL-SCNC: 106 MMOL/L (ref 94–109)
CO2 SERPL-SCNC: 30 MMOL/L (ref 20–32)
CREAT SERPL-MCNC: 0.88 MG/DL (ref 0.66–1.25)
ERYTHROCYTE [DISTWIDTH] IN BLOOD BY AUTOMATED COUNT: 14 % (ref 10–15)
GFR SERPL CREATININE-BSD FRML MDRD: 85 ML/MIN/{1.73_M2}
GLUCOSE SERPL-MCNC: 102 MG/DL (ref 70–99)
HCT VFR BLD AUTO: 39.8 % (ref 40–53)
HGB BLD-MCNC: 12.9 G/DL (ref 13.3–17.7)
MCH RBC QN AUTO: 30.6 PG (ref 26.5–33)
MCHC RBC AUTO-ENTMCNC: 32.4 G/DL (ref 31.5–36.5)
MCV RBC AUTO: 94 FL (ref 78–100)
PLATELET # BLD AUTO: 301 10E9/L (ref 150–450)
POTASSIUM SERPL-SCNC: 5 MMOL/L (ref 3.4–5.3)
RADIOLOGIST FLAGS: ABNORMAL
RBC # BLD AUTO: 4.22 10E12/L (ref 4.4–5.9)
SODIUM SERPL-SCNC: 139 MMOL/L (ref 133–144)
WBC # BLD AUTO: 9.3 10E9/L (ref 4–11)

## 2021-05-10 PROCEDURE — 36415 COLL VENOUS BLD VENIPUNCTURE: CPT | Performed by: INTERNAL MEDICINE

## 2021-05-10 PROCEDURE — 93000 ELECTROCARDIOGRAM COMPLETE: CPT | Performed by: INTERNAL MEDICINE

## 2021-05-10 PROCEDURE — 71101 X-RAY EXAM UNILAT RIBS/CHEST: CPT | Mod: LT

## 2021-05-10 PROCEDURE — 93971 EXTREMITY STUDY: CPT | Mod: LT

## 2021-05-10 PROCEDURE — 80048 BASIC METABOLIC PNL TOTAL CA: CPT | Performed by: INTERNAL MEDICINE

## 2021-05-10 PROCEDURE — 85027 COMPLETE CBC AUTOMATED: CPT | Performed by: INTERNAL MEDICINE

## 2021-05-10 PROCEDURE — 99215 OFFICE O/P EST HI 40 MIN: CPT | Performed by: INTERNAL MEDICINE

## 2021-05-10 RX ORDER — LIDOCAINE 50 MG/G
1 PATCH TOPICAL EVERY 24 HOURS
Qty: 31 PATCH | Refills: 1 | Status: SHIPPED | OUTPATIENT
Start: 2021-05-10 | End: 2021-05-13

## 2021-05-10 RX ORDER — AMLODIPINE BESYLATE 5 MG/1
TABLET ORAL
Qty: 180 TABLET | Refills: 3 | Status: SHIPPED | OUTPATIENT
Start: 2021-05-10 | End: 2021-06-03

## 2021-05-10 ASSESSMENT — MIFFLIN-ST. JEOR: SCORE: 1519.87

## 2021-05-10 NOTE — PROGRESS NOTES
Assessment & Plan   There are multiple concerns today which are urgent  S/p left hip fracture  The wound is healing well but there are many concerns related to the left hip fracture   physical therapy will be ordered by Dr. Arriola orthopedics  - CBC with platelets    Chest pain, unspecified type  Patient has rib fracture most likely but will need to rule out cardiac event  Clinically this is muscular because it is positional  - EKG 12-lead complete w/read - Clinics    Obstructive chronic bronchitis with exacerbation (H)  He did have pneumonia when he wasadmitted in the hospital and we need to take a chest x-ray  Most likely he will need more antibiotics   - Fluticasone-Umeclidin-Vilanterol (TRELEGY ELLIPTA) 100-62.5-25 MCG/INH oral inhaler  Dispense: 90 each; Refill: 3    Left leg swelling  *He definitely needs a DVT study today stat which will be arranged D-dimer will not be helpful  - Basic metabolic panel  - US Lower Extremity Venous Duplex Left    Traumatic injury of rib  As above  - EKG 12-lead complete w/read - Clinics  - XR Ribs & Chest Left G/E 3 Views  - lidocaine (LIDODERM) 5 % patch  Dispense: 31 patch; Refill: 1    Benign essential hypertension  Patient is also on lisinopril and metoprolol  Amlodipine can cause edema but that is not the cause here  - amLODIPine (NORVASC) 5 MG tablet  Dispense: 180 tablet; Refill: 3    Immunodeficiency (H)  Patient is very immunocompromise because of rheumatoid arthritis and frequent courses of steroids      He will also need a DEXA scan    We will be communicating with patient today after all the above work-up is done  Return in about 4 weeks (around 6/7/2021) for chest pain, VIDEO VISIT.    Yaneli Dozier MD  [facility] LAN Terry is a 75 year old who presents for the following health issues  accompanied by his spouse:    HPI       Chief Complaint   Patient presents with     Follow Up     Closed displaced fracture of left femoral neck  "with routine healing        Patient was seen Friday virtually after his left hip fracture  He was walking outside the bar and normally does not drink   he tripped and fell on the left side  So he broke his left femur neck and underwent left hip hemiarthroplasty  He is home now he also had pneumonia and continues to have some cough and shortness of breath after finishing antibiotics    His left leg is quite swollen and he also has chest wall pain with change of position now  This is quite significant          Review of Systems   10 point ROS of systems including Constitutional, Eyes, Respiratory, Cardiovascular, Gastroenterology, Genitourinary, Integumentary, Muscularskeletal, Psychiatric were all negative except for pertinent positives noted in my HPI.        Objective    /66 (BP Location: Right arm, Patient Position: Sitting, Cuff Size: Adult Regular)   Pulse 60   Temp 96.6  F (35.9  C) (Temporal)   Ht 1.745 m (5' 8.7\")   Wt 78.9 kg (174 lb)   SpO2 98%   BMI 25.92 kg/m    Body mass index is 25.92 kg/m .  Physical Exam   GENERAL: healthy, alert and no distress  NECK: no adenopathy, no asymmetry, masses, or scars and thyroid normal to palpation  RESP: lungs clear to auscultation - no rales, rhonchi or wheezes  CV: Very tender on the left 7th-12th rib which is reproducible   regular rate and rhythm, normal S1 S2, no S3 or S4, no murmur, click or rub, no peripheral edema and peripheral pulses strong  ABDOMEN: soft, nontender, no hepatosplenomegaly, no masses and bowel sounds normal  MS: Wound is healthy and left leg has 3+ edema with mild redness without warmth    Transferred Records on 04/15/2021   Component Date Value Ref Range Status     Potassium 04/22/2021 4.0  3.5 - 5.1 mmol/L Final     Creatinine 04/22/2021 0.63* 0.70 - 1.30 mg/dL Final     GFR Estimate 04/22/2021 >60  >60 ml/min Final     GFR Estimate If Black 04/22/2021 >60  >60 ml/min Final     Glucose 04/22/2021 86  74 - 106 mg/dL Final "         Lab Results   Component Value Date    HGB 15.2 03/03/2021

## 2021-05-10 NOTE — TELEPHONE ENCOUNTER
Central Prior Authorization Team   Phone: [phone]      EPA denial - waiting for INS to fax denial letter.

## 2021-05-11 NOTE — TELEPHONE ENCOUNTER
Central Prior Authorization Team   Phone: [phone]      Denial letter not received - INS will refax.

## 2021-05-12 NOTE — TELEPHONE ENCOUNTER
Central Prior Authorization Team   Phone: [phone]      PRIOR AUTHORIZATION DENIED    Medication: lidocaine (LIDODERM) 5 % patch - DENIED    Denial Date: 5/10/2021    Denial Rational:       Appeal Information:

## 2021-05-13 ENCOUNTER — HOSPITAL ENCOUNTER (OUTPATIENT)
Dept: CT IMAGING | Facility: CLINIC | Age: 76
Discharge: HOME | End: 2021-05-13
Attending: INTERNAL MEDICINE | Admitting: INTERNAL MEDICINE
Payer: COMMERCIAL

## 2021-05-13 DIAGNOSIS — R91.8 PULMONARY NODULES: ICD-10-CM

## 2021-05-13 PROCEDURE — 71250 CT THORAX DX C-: CPT

## 2021-05-18 DIAGNOSIS — I10 BENIGN ESSENTIAL HYPERTENSION: ICD-10-CM

## 2021-05-18 RX ORDER — LISINOPRIL 40 MG/1
TABLET ORAL
Qty: 90 TABLET | Refills: 3 | Status: SHIPPED | OUTPATIENT
Start: 2021-05-18 | End: 2022-04-06

## 2021-05-26 ENCOUNTER — TRANSFERRED RECORDS (OUTPATIENT)
Dept: HEALTH INFORMATION MANAGEMENT | Facility: CLINIC | Age: 76
End: 2021-05-26

## 2021-06-01 DIAGNOSIS — M05.79 RHEUMATOID ARTHRITIS INVOLVING MULTIPLE SITES WITH POSITIVE RHEUMATOID FACTOR (H): ICD-10-CM

## 2021-06-02 NOTE — TELEPHONE ENCOUNTER
Patient is due for methotrexate toxicity monitoring labs.  Fantex message was sent to the patient.  RN will resubmit refill request once labs have been completed.    Mert CASANOVA RN....6/2/2021 2:28 PM

## 2021-06-03 ENCOUNTER — OFFICE VISIT (OUTPATIENT)
Dept: FAMILY MEDICINE | Facility: CLINIC | Age: 76
End: 2021-06-03
Payer: COMMERCIAL

## 2021-06-03 VITALS — WEIGHT: 178.5 LBS | BODY MASS INDEX: 26.59 KG/M2 | OXYGEN SATURATION: 96 % | HEART RATE: 52 BPM

## 2021-06-03 DIAGNOSIS — R60.9 EDEMA, UNSPECIFIED TYPE: Primary | ICD-10-CM

## 2021-06-03 DIAGNOSIS — M05.79 RHEUMATOID ARTHRITIS INVOLVING MULTIPLE SITES WITH POSITIVE RHEUMATOID FACTOR (H): ICD-10-CM

## 2021-06-03 DIAGNOSIS — J44.9 CHRONIC OBSTRUCTIVE PULMONARY DISEASE, UNSPECIFIED COPD TYPE (H): ICD-10-CM

## 2021-06-03 DIAGNOSIS — M48.062 SPINAL STENOSIS OF LUMBAR REGION WITH NEUROGENIC CLAUDICATION: ICD-10-CM

## 2021-06-03 DIAGNOSIS — R91.8 PULMONARY NODULES: ICD-10-CM

## 2021-06-03 DIAGNOSIS — E05.90 HYPERTHYROIDISM: ICD-10-CM

## 2021-06-03 DIAGNOSIS — I10 BENIGN ESSENTIAL HYPERTENSION: ICD-10-CM

## 2021-06-03 DIAGNOSIS — Z87.81 S/P LEFT HIP FRACTURE: ICD-10-CM

## 2021-06-03 LAB
ALBUMIN SERPL-MCNC: 3.5 G/DL (ref 3.4–5)
ALP SERPL-CCNC: 154 U/L (ref 40–150)
ALT SERPL W P-5'-P-CCNC: 16 U/L (ref 0–70)
ANION GAP SERPL CALCULATED.3IONS-SCNC: 4 MMOL/L (ref 3–14)
AST SERPL W P-5'-P-CCNC: 12 U/L (ref 0–45)
BILIRUB SERPL-MCNC: 0.6 MG/DL (ref 0.2–1.3)
BUN SERPL-MCNC: 11 MG/DL (ref 7–30)
CALCIUM SERPL-MCNC: 9.2 MG/DL (ref 8.5–10.1)
CHLORIDE SERPL-SCNC: 109 MMOL/L (ref 94–109)
CO2 SERPL-SCNC: 29 MMOL/L (ref 20–32)
CREAT SERPL-MCNC: 0.85 MG/DL (ref 0.66–1.25)
ERYTHROCYTE [DISTWIDTH] IN BLOOD BY AUTOMATED COUNT: 14.4 % (ref 10–15)
ERYTHROCYTE [SEDIMENTATION RATE] IN BLOOD BY WESTERGREN METHOD: 17 MM/H (ref 0–20)
GFR SERPL CREATININE-BSD FRML MDRD: 86 ML/MIN/{1.73_M2}
GLUCOSE SERPL-MCNC: 87 MG/DL (ref 70–99)
HCT VFR BLD AUTO: 39.8 % (ref 40–53)
HGB BLD-MCNC: 13 G/DL (ref 13.3–17.7)
MCH RBC QN AUTO: 30.3 PG (ref 26.5–33)
MCHC RBC AUTO-ENTMCNC: 32.7 G/DL (ref 31.5–36.5)
MCV RBC AUTO: 93 FL (ref 78–100)
PLATELET # BLD AUTO: 229 10E9/L (ref 150–450)
POTASSIUM SERPL-SCNC: 4.5 MMOL/L (ref 3.4–5.3)
PROT SERPL-MCNC: 7.1 G/DL (ref 6.8–8.8)
RBC # BLD AUTO: 4.29 10E12/L (ref 4.4–5.9)
SODIUM SERPL-SCNC: 142 MMOL/L (ref 133–144)
TSH SERPL DL<=0.005 MIU/L-ACNC: 0.92 MU/L (ref 0.4–4)
WBC # BLD AUTO: 6.9 10E9/L (ref 4–11)

## 2021-06-03 PROCEDURE — 85652 RBC SED RATE AUTOMATED: CPT | Performed by: INTERNAL MEDICINE

## 2021-06-03 PROCEDURE — 36415 COLL VENOUS BLD VENIPUNCTURE: CPT | Performed by: INTERNAL MEDICINE

## 2021-06-03 PROCEDURE — 84443 ASSAY THYROID STIM HORMONE: CPT | Performed by: INTERNAL MEDICINE

## 2021-06-03 PROCEDURE — 80053 COMPREHEN METABOLIC PANEL: CPT | Performed by: INTERNAL MEDICINE

## 2021-06-03 PROCEDURE — 85027 COMPLETE CBC AUTOMATED: CPT | Performed by: INTERNAL MEDICINE

## 2021-06-03 PROCEDURE — 99214 OFFICE O/P EST MOD 30 MIN: CPT | Performed by: INTERNAL MEDICINE

## 2021-06-03 RX ORDER — AMLODIPINE BESYLATE 5 MG/1
5 TABLET ORAL AT BEDTIME
Qty: 180 TABLET | Refills: 3 | COMMUNITY
Start: 2021-06-03 | End: 2021-07-06

## 2021-06-03 RX ORDER — HYDROCODONE BITARTRATE AND ACETAMINOPHEN 5; 325 MG/1; MG/1
1 TABLET ORAL 3 TIMES DAILY PRN
Qty: 90 TABLET | Refills: 0 | Status: ON HOLD | OUTPATIENT
Start: 2021-06-03 | End: 2021-07-02

## 2021-06-03 RX ORDER — FUROSEMIDE 20 MG
20 TABLET ORAL 2 TIMES DAILY
Qty: 180 TABLET | Refills: 3 | Status: SHIPPED | OUTPATIENT
Start: 2021-06-03 | End: 2022-05-02

## 2021-06-03 NOTE — H&P (VIEW-ONLY)
ICD-10-CM    1. Edema, unspecified type  R60.9 furosemide (LASIX) 20 MG tablet   2. Rheumatoid arthritis involving multiple sites with positive rheumatoid factor (H)  M05.79 CBC with platelets     Comprehensive metabolic panel     Erythrocyte sedimentation rate auto   3. Chronic obstructive pulmonary disease, unspecified COPD type (H)  J44.9    4. Benign essential hypertension  I10 amLODIPine (NORVASC) 5 MG tablet   5. Spinal stenosis of lumbar region with neurogenic claudication  M48.062 HYDROcodone-acetaminophen (NORCO) 5-325 MG tablet   6. Hyperthyroidism  E05.90 TSH with free T4 reflex   7. S/p left hip fracture  Z87.81    8. Pulmonary nodules  R91.8 CT Chest w/o Contrast     Patient is very nice 75 years old gentleman who is accompanied by his wife Adeola  His edema is post surgery and we will reduce the dose of amlodipine and increase the dose of Lasix  He is on lisinopril and I do not think we need to add potassium but we will monitor the basic metabolic panel  He requires his rheumatoid arthritis labs per his rheumatologist which will be ordered  In addition patient's TSH is due on methimazole  His left hip wound is doing very well and I examined it today  In addition I discussed with him about the CT lung finding and showed the images to patient and his wife  He has an appointment in 2 months with his pulmonologist but I think he needs to see the node clinic specialist  And I have sent a message requesting for his appointment  In the meantime I will order a PET scan  Patient is a non-smoker currently but does have COPD with previous history of smoking  Patient and his wife voiced understanding of these changes    Artur Terry is a 75 year old who presents for the following health issues  accompanied by his spouse:    HPI       Chief Complaint   Patient presents with     Edema     left leg         Patient is having leg edema which is not improving since  left hip surgery  He had seen me and DVT  has been ruled out  His breathing is okay and back pain is stable  Physical therapy is going on for hip  We will also be discussing the CT findings today    Review of Systems   10 point ROS of systems including Constitutional, Eyes, Respiratory, Cardiovascular, Gastroenterology, Genitourinary, Integumentary, Muscularskeletal, Psychiatric were all negative except for pertinent positives noted in my HPI.        Objective    Pulse 52   Wt 81 kg (178 lb 8 oz)   SpO2 96%   BMI 26.59 kg/m    Body mass index is 26.59 kg/m .  Physical Exam   GENERAL: healthy, alert and no distress  NECK: no adenopathy, no asymmetry, masses, or scars and thyroid normal to palpation  RESP: lungs clear to auscultation - no rales, rhonchi or wheezes  CV: regular rate and rhythm, normal S1 S2, no S3 or S4, no murmur, click or rub, no peripheral edema and peripheral pulses strong  ABDOMEN: soft, nontender, no hepatosplenomegaly, no masses and bowel sounds normal  MS: no gross musculoskeletal defects noted, bilateral edema    Hospital Outpatient Visit on 05/10/2021   Component Date Value Ref Range Status     Radiologist flags 05/10/2021 Lung nodule. CT recommended. Rib fracture.*  Final     Lab Results   Component Value Date    WBC 6.9 06/03/2021     Lab Results   Component Value Date    RBC 4.29 06/03/2021     Lab Results   Component Value Date    HGB 13.0 06/03/2021     Lab Results   Component Value Date    HCT 39.8 06/03/2021     No components found for: MCT  Lab Results   Component Value Date    MCV 93 06/03/2021     Lab Results   Component Value Date    MCH 30.3 06/03/2021     Lab Results   Component Value Date    MCHC 32.7 06/03/2021     Lab Results   Component Value Date    RDW 14.4 06/03/2021     Lab Results   Component Value Date     06/03/2021         Patient Active Problem List   Diagnosis     Essential hypertension, benign     Pain in joint, upper arm     Allergic rhinitis     Pain in joint, lower leg     Chronic airway  obstruction (H)     Monoclonal paraproteinemia     Immunodeficiency (H)     Eczema     COPD (chronic obstructive pulmonary disease) (H)     Transient cerebral ischemia     Bunion     Occipital neuralgia     HYPERLIPIDEMIA LDL GOAL <100     Health Care Home     Low back pain     Advanced directives, counseling/discussion     Olecranon bursitis of right elbow     Bruit     Retina hole     signed & scanned on 09/23/2011  (1-4-2013 printed but not scanned in)      Chronic, continuous use of opioids     Atherosclerosis of native coronary artery of native heart without angina pectoris     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, unspecified thyrotoxicosis type     Right-sided low back pain with right-sided sciatica     SOB (shortness of breath)     Coronary artery disease involving native coronary artery of native heart without angina pectoris     BRENNEN (obstructive sleep apnea)     BPH (benign prostatic hyperplasia)     Rheumatoid arthritis (H)     Hammer toe of right foot     Hallux limitus of right foot     Corn of toe       Current Outpatient Medications:      amLODIPine (NORVASC) 5 MG tablet, Take 1 tablet (5 mg) by mouth At Bedtime, Disp: 180 tablet, Rfl: 3     furosemide (LASIX) 20 MG tablet, Take 1 tablet (20 mg) by mouth 2 times daily, Disp: 180 tablet, Rfl: 3     HYDROcodone-acetaminophen (NORCO) 5-325 MG tablet, Take 1 tablet by mouth 3 times daily as needed for moderate to severe pain, Disp: 90 tablet, Rfl: 0     alendronate (FOSAMAX) 70 MG tablet, Take 1 tablet (70 mg) by mouth every 7 days ; take with 8oz glass of water on empty stomach, remain upright and do not eat for 45 minutes., Disp: 12 tablet, Rfl: 3     aspirin 81 MG tablet, Take 1 tablet by mouth 2 times daily, Disp: , Rfl:      Carisoprodol 250 MG TABS, TAKE 1 TABLET BY MOUTH DAILY AS NEEDED FOR BACK ISSUE, Disp: 31 tablet, Rfl: 1     FISH OIL 1000 MG OR CAPS, take one tablet twice daily, Disp: , Rfl:      Fluticasone-Umeclidin-Vilanterol (TRELEGY  ELLIPTA) 100-62.5-25 MCG/INH oral inhaler, Inhale 1 puff into the lungs daily, Disp: 90 each, Rfl: 3     folic acid (FOLVITE) 1 MG tablet, Take 2 tablets (2 mg) by mouth daily, Disp: 180 tablet, Rfl: 1     guaiFENesin (MUCINEX) 600 MG 12 hr tablet, Take 2 tablets (1,200 mg) by mouth 2 times daily, Disp: 180 tablet, Rfl: 3     levalbuterol (XOPENEX) 0.31 MG/3ML neb solution, INHALE 1 VIAL (3ML) BY NEBULIZATION EVERY 4 HOURS AS NEEDED FOR WHEEZING OR SHORTNESS OF BREATH., Disp: 720 mL, Rfl: 0     lidocaine (LIDODERM) 5 % patch, Place 1 patch onto the skin every 24 hours To prevent lidocaine toxicity, patient should be patch free for 12 hrs daily., Disp: 31 patch, Rfl: 1     lisinopril (ZESTRIL) 40 MG tablet, TAKE 1 TABLET BY MOUTH EVERY DAY, Disp: 90 tablet, Rfl: 3     methimazole (TAPAZOLE) 5 MG tablet, Take 5 mg by mouth Take one tablet daily on Monday and Thursday, Disp: , Rfl:      methotrexate 2.5 MG tablet, Take 5 tablets (12.5 mg) by mouth every 7 days . Labs required every 8-12 weeks., Disp: 65 tablet, Rfl: 0     metoprolol succinate ER (TOPROL-XL) 50 MG 24 hr tablet, Take 1 tablet (50 mg) by mouth daily (take with 25mg tablet for total 75mg daily), Disp: 90 tablet, Rfl: 2     Multiple Vitamins-Minerals (MULTIVITAMIN OR), Take 1 tablet by mouth daily , Disp: , Rfl:      nitroGLYcerin (NITROSTAT) 0.4 MG sublingual tablet, For chest pain place 1 tablet under the tongue every 5 minutes for 3 doses. If symptoms persist 5 minutes after 1st dose call 911., Disp: 21 tablet, Rfl: 0     order for DME, Equipment being ordered: Oxygen Please provide O2 continuous via NC from PORTABLE O2 concentrator for overnight usage at 2 LPM, Disp: 1 Device, Rfl: 0     order for DME, Equipment being ordered: flutter valve Incentive spirometer, Disp: 1 each, Rfl: 0     roflumilast (DALIRESP) 500 MCG TABS tablet, Take 1 tablet (500 mcg) by mouth daily, Disp: 31 tablet, Rfl: 3     rosuvastatin (CRESTOR) 10 MG tablet, TAKE 1 TABLET BY  MOUTH EVERY DAY, Disp: 90 tablet, Rfl: 3     sildenafil (VIAGRA) 100 MG tablet, Take 1 tablet (100 mg) by mouth daily as needed (prn), Disp: 20 tablet, Rfl: 6     umeclidinium (INCRUSE ELLIPTA) 62.5 MCG/INH inhaler, Inhale 1 puff into the lungs daily, Disp: 1 Inhaler, Rfl: 1

## 2021-06-08 DIAGNOSIS — J44.1 COPD EXACERBATION (H): ICD-10-CM

## 2021-06-08 NOTE — TELEPHONE ENCOUNTER
Reason for Call:  Medication or medication refill:    Do you use a [facility]?  Name of the pharmacy and phone number for the current request:     [id number] - [place], [place] - [address] [place] [address]    Name of the medication requested: albuterol (PROAIR HFA) 108 (90 Base) MCG/ACT inhaler     Other request:     Can we leave a detailed message on this number? YES    Phone number patient can be reached at: Cell number on file:    Telephone Information:   Mobile [phone]     Best Time: any    Call taken on 6/8/2021 at 1:19 PM by Suri Santiago

## 2021-06-08 NOTE — TELEPHONE ENCOUNTER
Labs were done on 6/3/21.  Forwarded refill request to Dr. Cedillo to refill med if appropriate.    Mert CASANOVA RN....6/8/2021 2:33 PM

## 2021-06-08 NOTE — TELEPHONE ENCOUNTER
Rheumatology team: Please call to notify Mr. Thomas that methotrexate has been refilled.  Niles Cedillo MD  6/8/2021 4:44 PM

## 2021-06-09 RX ORDER — ALBUTEROL SULFATE 90 UG/1
AEROSOL, METERED RESPIRATORY (INHALATION)
Qty: 25.5 G | Refills: 0 | Status: SHIPPED | OUTPATIENT
Start: 2021-06-09 | End: 2021-08-12

## 2021-06-09 NOTE — TELEPHONE ENCOUNTER
Routing refill request to provider for review/approval because:  A break in medication, not on medlist, last filled 8/4/2016, on med list list in dec. 2019.  & ACT not on file.      Last office vist: 6/3/21    Pended 25.5g inhaler & 0 refills. Please Review.    Next office visit: none      Anastasiia Weiss RN  [facility]

## 2021-06-14 ENCOUNTER — HOSPITAL ENCOUNTER (OUTPATIENT)
Dept: PET IMAGING | Facility: CLINIC | Age: 76
Discharge: HOME | End: 2021-06-14
Attending: INTERNAL MEDICINE | Admitting: INTERNAL MEDICINE
Payer: COMMERCIAL

## 2021-06-14 DIAGNOSIS — R91.8 PULMONARY NODULES: ICD-10-CM

## 2021-06-14 PROCEDURE — 71260 CT THORAX DX C+: CPT | Mod: 26

## 2021-06-14 PROCEDURE — 71260 CT THORAX DX C+: CPT

## 2021-06-14 PROCEDURE — 74177 CT ABD & PELVIS W/CONTRAST: CPT | Mod: 26

## 2021-06-14 PROCEDURE — 250N000011 HC RX IP 250 OP 636: Performed by: INTERNAL MEDICINE

## 2021-06-14 PROCEDURE — 343N000001 HC RX 343: Performed by: INTERNAL MEDICINE

## 2021-06-14 PROCEDURE — 78816 PET IMAGE W/CT FULL BODY: CPT

## 2021-06-14 PROCEDURE — 78816 PET IMAGE W/CT FULL BODY: CPT | Mod: 26

## 2021-06-14 PROCEDURE — A9552 F18 FDG: HCPCS | Performed by: INTERNAL MEDICINE

## 2021-06-14 RX ORDER — IOPAMIDOL 755 MG/ML
10-135 INJECTION, SOLUTION INTRAVASCULAR ONCE
Status: COMPLETED | OUTPATIENT
Start: 2021-06-14 | End: 2021-06-14

## 2021-06-14 RX ADMIN — IOPAMIDOL 109 ML: 755 INJECTION, SOLUTION INTRAVENOUS at 08:57

## 2021-06-14 RX ADMIN — FLUDEOXYGLUCOSE F-18 10.11 MCI.: 500 INJECTION, SOLUTION INTRAVENOUS at 08:55

## 2021-06-15 DIAGNOSIS — C34.90 MALIGNANT NEOPLASM OF LUNG, UNSPECIFIED LATERALITY, UNSPECIFIED PART OF LUNG (H): ICD-10-CM

## 2021-06-15 DIAGNOSIS — R91.1 LUNG NODULE: Primary | ICD-10-CM

## 2021-06-16 ENCOUNTER — TRANSFERRED RECORDS (OUTPATIENT)
Dept: HEALTH INFORMATION MANAGEMENT | Facility: CLINIC | Age: 76
End: 2021-06-16

## 2021-06-16 DIAGNOSIS — Z11.59 ENCOUNTER FOR SCREENING FOR OTHER VIRAL DISEASES: ICD-10-CM

## 2021-06-17 ENCOUNTER — TELEPHONE (OUTPATIENT)
Dept: SURGERY | Facility: CLINIC | Age: 76
End: 2021-06-17

## 2021-06-17 NOTE — TELEPHONE ENCOUNTER
RECORDS STATUS - ALL OTHER DIAGNOSIS      RECORDS RECEIVED FROM: Epic, [facility]   DATE RECEIVED:    NOTES STATUS DETAILS   OFFICE NOTE from referring provider Epic Dr. Yaneli Dozier   OFFICE NOTE from medical oncologist     DISCHARGE SUMMARY from hospital [facility]    DISCHARGE REPORT from the ER [facility] 2/24/19   OPERATIVE REPORT     MEDICATION LIST [facility] 6/9/21   CLINICAL TRIAL TREATMENTS TO DATE     LABS     PATHOLOGY REPORTS NA    ANYTHING RELATED TO DIAGNOSIS Epic 6/3/21   GENONOMIC TESTING     TYPE:     IMAGING (NEED IMAGES & REPORT)     XR Requested 6/17 4/20/21, 4/19/21: [facility]   CT SCANS     MRI     MAMMO     ULTRASOUND     PET

## 2021-06-17 NOTE — TELEPHONE ENCOUNTER
Called patient about having an appointment with Dr. Rachel and having a bronchoscopy scheduled at [facility] on the same day. Explained to him that we have our patients see Interventional Pulmonology prior to a bronchoscopy and since he is scheduled for the bronch it doesn't make sense to see Dr. Rachel. We will wait for the pathology to come back and depending what the pathology is he may be referred to Thoracic Surgery or Medical Oncology or both. Dr. Dozier will also be watching for the pathology results and patient has the choice of seeing Thoracic Surgery &/or Medical oncology in [place] or [place]. Patient understood and will wait to hear what the pathology is. No questions or concerns.

## 2021-06-18 DIAGNOSIS — Z11.59 ENCOUNTER FOR SCREENING FOR OTHER VIRAL DISEASES: ICD-10-CM

## 2021-06-18 LAB
LABORATORY COMMENT REPORT: NORMAL
SARS-COV-2 RNA RESP QL NAA+PROBE: NEGATIVE
SARS-COV-2 RNA RESP QL NAA+PROBE: NORMAL
SPECIMEN SOURCE: NORMAL
SPECIMEN SOURCE: NORMAL

## 2021-06-18 PROCEDURE — U0005 INFEC AGEN DETEC AMPLI PROBE: HCPCS | Performed by: THORACIC SURGERY (CARDIOTHORACIC VASCULAR SURGERY)

## 2021-06-18 PROCEDURE — U0003 INFECTIOUS AGENT DETECTION BY NUCLEIC ACID (DNA OR RNA); SEVERE ACUTE RESPIRATORY SYNDROME CORONAVIRUS 2 (SARS-COV-2) (CORONAVIRUS DISEASE [COVID-19]), AMPLIFIED PROBE TECHNIQUE, MAKING USE OF HIGH THROUGHPUT TECHNOLOGIES AS DESCRIBED BY CMS-2020-01-R: HCPCS | Performed by: THORACIC SURGERY (CARDIOTHORACIC VASCULAR SURGERY)

## 2021-06-21 ENCOUNTER — ANESTHESIA EVENT (OUTPATIENT)
Dept: SURGERY | Facility: CLINIC | Age: 76
End: 2021-06-21
Payer: COMMERCIAL

## 2021-06-21 ASSESSMENT — COPD QUESTIONNAIRES: COPD: 1

## 2021-06-21 ASSESSMENT — LIFESTYLE VARIABLES: TOBACCO_USE: 1

## 2021-06-22 ENCOUNTER — PRE VISIT (OUTPATIENT)
Dept: PULMONOLOGY | Facility: CLINIC | Age: 76
End: 2021-06-22

## 2021-06-22 ENCOUNTER — HOSPITAL ENCOUNTER (OUTPATIENT)
Facility: CLINIC | Age: 76
Discharge: HOME | End: 2021-06-22
Attending: THORACIC SURGERY (CARDIOTHORACIC VASCULAR SURGERY) | Admitting: THORACIC SURGERY (CARDIOTHORACIC VASCULAR SURGERY)
Payer: COMMERCIAL

## 2021-06-22 ENCOUNTER — ANESTHESIA (OUTPATIENT)
Dept: SURGERY | Facility: CLINIC | Age: 76
End: 2021-06-22
Payer: COMMERCIAL

## 2021-06-22 VITALS
HEART RATE: 53 BPM | DIASTOLIC BLOOD PRESSURE: 65 MMHG | BODY MASS INDEX: 23.96 KG/M2 | WEIGHT: 167.4 LBS | RESPIRATION RATE: 16 BRPM | HEIGHT: 70 IN | OXYGEN SATURATION: 96 % | SYSTOLIC BLOOD PRESSURE: 135 MMHG | TEMPERATURE: 96 F

## 2021-06-22 LAB — POTASSIUM SERPL-SCNC: 4.5 MMOL/L (ref 3.4–5.3)

## 2021-06-22 PROCEDURE — 250N000025 HC SEVOFLURANE, PER MIN: Performed by: THORACIC SURGERY (CARDIOTHORACIC VASCULAR SURGERY)

## 2021-06-22 PROCEDURE — 250N000009 HC RX 250: Performed by: NURSE ANESTHETIST, CERTIFIED REGISTERED

## 2021-06-22 PROCEDURE — 250N000011 HC RX IP 250 OP 636: Performed by: NURSE ANESTHETIST, CERTIFIED REGISTERED

## 2021-06-22 PROCEDURE — 88172 CYTP DX EVAL FNA 1ST EA SITE: CPT | Mod: 26 | Performed by: PATHOLOGY

## 2021-06-22 PROCEDURE — 84132 ASSAY OF SERUM POTASSIUM: CPT | Performed by: ANESTHESIOLOGY

## 2021-06-22 PROCEDURE — 999N001018 HC STATISTIC H-CELL BLOCK W/STAIN: Performed by: THORACIC SURGERY (CARDIOTHORACIC VASCULAR SURGERY)

## 2021-06-22 PROCEDURE — 710N000012 HC RECOVERY PHASE 2, PER MINUTE: Performed by: THORACIC SURGERY (CARDIOTHORACIC VASCULAR SURGERY)

## 2021-06-22 PROCEDURE — 88173 CYTOPATH EVAL FNA REPORT: CPT | Mod: 26 | Performed by: PATHOLOGY

## 2021-06-22 PROCEDURE — 258N000003 HC RX IP 258 OP 636: Performed by: NURSE ANESTHETIST, CERTIFIED REGISTERED

## 2021-06-22 PROCEDURE — 360N000076 HC SURGERY LEVEL 3, PER MIN: Performed by: THORACIC SURGERY (CARDIOTHORACIC VASCULAR SURGERY)

## 2021-06-22 PROCEDURE — 272N000001 HC OR GENERAL SUPPLY STERILE: Performed by: THORACIC SURGERY (CARDIOTHORACIC VASCULAR SURGERY)

## 2021-06-22 PROCEDURE — 999N000141 HC STATISTIC PRE-PROCEDURE NURSING ASSESSMENT: Performed by: THORACIC SURGERY (CARDIOTHORACIC VASCULAR SURGERY)

## 2021-06-22 PROCEDURE — 370N000017 HC ANESTHESIA TECHNICAL FEE, PER MIN: Performed by: THORACIC SURGERY (CARDIOTHORACIC VASCULAR SURGERY)

## 2021-06-22 PROCEDURE — 88305 TISSUE EXAM BY PATHOLOGIST: CPT | Mod: 26 | Performed by: PATHOLOGY

## 2021-06-22 PROCEDURE — 88173 CYTOPATH EVAL FNA REPORT: CPT | Mod: TC,91 | Performed by: THORACIC SURGERY (CARDIOTHORACIC VASCULAR SURGERY)

## 2021-06-22 PROCEDURE — 710N000009 HC RECOVERY PHASE 1, LEVEL 1, PER MIN: Performed by: THORACIC SURGERY (CARDIOTHORACIC VASCULAR SURGERY)

## 2021-06-22 PROCEDURE — 36415 COLL VENOUS BLD VENIPUNCTURE: CPT | Performed by: ANESTHESIOLOGY

## 2021-06-22 PROCEDURE — 88172 CYTP DX EVAL FNA 1ST EA SITE: CPT | Mod: TC | Performed by: THORACIC SURGERY (CARDIOTHORACIC VASCULAR SURGERY)

## 2021-06-22 PROCEDURE — 88305 TISSUE EXAM BY PATHOLOGIST: CPT | Mod: TC | Performed by: THORACIC SURGERY (CARDIOTHORACIC VASCULAR SURGERY)

## 2021-06-22 PROCEDURE — 88173 CYTOPATH EVAL FNA REPORT: CPT | Mod: TC | Performed by: THORACIC SURGERY (CARDIOTHORACIC VASCULAR SURGERY)

## 2021-06-22 RX ORDER — NALOXONE HYDROCHLORIDE 0.4 MG/ML
0.4 INJECTION, SOLUTION INTRAMUSCULAR; INTRAVENOUS; SUBCUTANEOUS
Status: DISCONTINUED | OUTPATIENT
Start: 2021-06-22 | End: 2021-06-22 | Stop reason: HOSPADM

## 2021-06-22 RX ORDER — ONDANSETRON 2 MG/ML
INJECTION INTRAMUSCULAR; INTRAVENOUS PRN
Status: DISCONTINUED | OUTPATIENT
Start: 2021-06-22 | End: 2021-06-22

## 2021-06-22 RX ORDER — ONDANSETRON 4 MG/1
4 TABLET, ORALLY DISINTEGRATING ORAL EVERY 30 MIN PRN
Status: DISCONTINUED | OUTPATIENT
Start: 2021-06-22 | End: 2021-06-22 | Stop reason: HOSPADM

## 2021-06-22 RX ORDER — NALOXONE HYDROCHLORIDE 0.4 MG/ML
0.2 INJECTION, SOLUTION INTRAMUSCULAR; INTRAVENOUS; SUBCUTANEOUS
Status: DISCONTINUED | OUTPATIENT
Start: 2021-06-22 | End: 2021-06-22 | Stop reason: HOSPADM

## 2021-06-22 RX ORDER — FENTANYL CITRATE 50 UG/ML
50 INJECTION, SOLUTION INTRAMUSCULAR; INTRAVENOUS
Status: DISCONTINUED | OUTPATIENT
Start: 2021-06-22 | End: 2021-06-22 | Stop reason: HOSPADM

## 2021-06-22 RX ORDER — FENTANYL CITRATE 50 UG/ML
25-50 INJECTION, SOLUTION INTRAMUSCULAR; INTRAVENOUS
Status: DISCONTINUED | OUTPATIENT
Start: 2021-06-22 | End: 2021-06-22 | Stop reason: HOSPADM

## 2021-06-22 RX ORDER — DEXAMETHASONE SODIUM PHOSPHATE 4 MG/ML
INJECTION, SOLUTION INTRA-ARTICULAR; INTRALESIONAL; INTRAMUSCULAR; INTRAVENOUS; SOFT TISSUE PRN
Status: DISCONTINUED | OUTPATIENT
Start: 2021-06-22 | End: 2021-06-22

## 2021-06-22 RX ORDER — PROPOFOL 10 MG/ML
INJECTION, EMULSION INTRAVENOUS PRN
Status: DISCONTINUED | OUTPATIENT
Start: 2021-06-22 | End: 2021-06-22

## 2021-06-22 RX ORDER — MEPERIDINE HYDROCHLORIDE 25 MG/ML
12.5 INJECTION INTRAMUSCULAR; INTRAVENOUS; SUBCUTANEOUS
Status: DISCONTINUED | OUTPATIENT
Start: 2021-06-22 | End: 2021-06-22 | Stop reason: HOSPADM

## 2021-06-22 RX ORDER — PROPOFOL 10 MG/ML
INJECTION, EMULSION INTRAVENOUS CONTINUOUS PRN
Status: DISCONTINUED | OUTPATIENT
Start: 2021-06-22 | End: 2021-06-22

## 2021-06-22 RX ORDER — SODIUM CHLORIDE, SODIUM LACTATE, POTASSIUM CHLORIDE, CALCIUM CHLORIDE 600; 310; 30; 20 MG/100ML; MG/100ML; MG/100ML; MG/100ML
INJECTION, SOLUTION INTRAVENOUS CONTINUOUS PRN
Status: DISCONTINUED | OUTPATIENT
Start: 2021-06-22 | End: 2021-06-22

## 2021-06-22 RX ORDER — ONDANSETRON 2 MG/ML
4 INJECTION INTRAMUSCULAR; INTRAVENOUS EVERY 30 MIN PRN
Status: DISCONTINUED | OUTPATIENT
Start: 2021-06-22 | End: 2021-06-22 | Stop reason: HOSPADM

## 2021-06-22 RX ORDER — FENTANYL CITRATE 50 UG/ML
INJECTION, SOLUTION INTRAMUSCULAR; INTRAVENOUS PRN
Status: DISCONTINUED | OUTPATIENT
Start: 2021-06-22 | End: 2021-06-22

## 2021-06-22 RX ORDER — NEOSTIGMINE METHYLSULFATE 1 MG/ML
VIAL (ML) INJECTION PRN
Status: DISCONTINUED | OUTPATIENT
Start: 2021-06-22 | End: 2021-06-22

## 2021-06-22 RX ORDER — GLYCOPYRROLATE 0.2 MG/ML
INJECTION, SOLUTION INTRAMUSCULAR; INTRAVENOUS PRN
Status: DISCONTINUED | OUTPATIENT
Start: 2021-06-22 | End: 2021-06-22

## 2021-06-22 RX ORDER — HYDROMORPHONE HYDROCHLORIDE 1 MG/ML
.3-.5 INJECTION, SOLUTION INTRAMUSCULAR; INTRAVENOUS; SUBCUTANEOUS EVERY 10 MIN PRN
Status: DISCONTINUED | OUTPATIENT
Start: 2021-06-22 | End: 2021-06-22 | Stop reason: HOSPADM

## 2021-06-22 RX ORDER — ACETAMINOPHEN 325 MG/1
650 TABLET ORAL
Status: DISCONTINUED | OUTPATIENT
Start: 2021-06-22 | End: 2021-06-22 | Stop reason: HOSPADM

## 2021-06-22 RX ORDER — SODIUM CHLORIDE, SODIUM LACTATE, POTASSIUM CHLORIDE, CALCIUM CHLORIDE 600; 310; 30; 20 MG/100ML; MG/100ML; MG/100ML; MG/100ML
INJECTION, SOLUTION INTRAVENOUS CONTINUOUS
Status: DISCONTINUED | OUTPATIENT
Start: 2021-06-22 | End: 2021-06-22 | Stop reason: HOSPADM

## 2021-06-22 RX ORDER — LIDOCAINE HYDROCHLORIDE 20 MG/ML
INJECTION, SOLUTION INFILTRATION; PERINEURAL PRN
Status: DISCONTINUED | OUTPATIENT
Start: 2021-06-22 | End: 2021-06-22

## 2021-06-22 RX ORDER — EPHEDRINE SULFATE 50 MG/ML
INJECTION, SOLUTION INTRAMUSCULAR; INTRAVENOUS; SUBCUTANEOUS PRN
Status: DISCONTINUED | OUTPATIENT
Start: 2021-06-22 | End: 2021-06-22

## 2021-06-22 RX ADMIN — FENTANYL CITRATE 25 MCG: 50 INJECTION, SOLUTION INTRAMUSCULAR; INTRAVENOUS at 08:08

## 2021-06-22 RX ADMIN — ONDANSETRON 4 MG: 2 INJECTION INTRAMUSCULAR; INTRAVENOUS at 08:07

## 2021-06-22 RX ADMIN — PROPOFOL 200 MG: 10 INJECTION, EMULSION INTRAVENOUS at 07:59

## 2021-06-22 RX ADMIN — Medication 5 MG: at 08:08

## 2021-06-22 RX ADMIN — SODIUM CHLORIDE, POTASSIUM CHLORIDE, SODIUM LACTATE AND CALCIUM CHLORIDE: 600; 310; 30; 20 INJECTION, SOLUTION INTRAVENOUS at 07:52

## 2021-06-22 RX ADMIN — Medication 5 MG: at 08:25

## 2021-06-22 RX ADMIN — LIDOCAINE HYDROCHLORIDE 100 MG: 20 INJECTION, SOLUTION INFILTRATION; PERINEURAL at 07:59

## 2021-06-22 RX ADMIN — Medication 5 MG: at 08:05

## 2021-06-22 RX ADMIN — Medication 5 MG: at 08:17

## 2021-06-22 RX ADMIN — FENTANYL CITRATE 75 MCG: 50 INJECTION, SOLUTION INTRAMUSCULAR; INTRAVENOUS at 07:59

## 2021-06-22 RX ADMIN — DEXAMETHASONE SODIUM PHOSPHATE 4 MG: 4 INJECTION, SOLUTION INTRA-ARTICULAR; INTRALESIONAL; INTRAMUSCULAR; INTRAVENOUS; SOFT TISSUE at 08:12

## 2021-06-22 RX ADMIN — PROPOFOL 150 MCG/KG/MIN: 10 INJECTION, EMULSION INTRAVENOUS at 07:59

## 2021-06-22 RX ADMIN — PROPOFOL 50 MG: 10 INJECTION, EMULSION INTRAVENOUS at 08:08

## 2021-06-22 RX ADMIN — MIDAZOLAM 2 MG: 1 INJECTION INTRAMUSCULAR; INTRAVENOUS at 07:53

## 2021-06-22 RX ADMIN — ROCURONIUM BROMIDE 10 MG: 10 INJECTION INTRAVENOUS at 08:35

## 2021-06-22 RX ADMIN — PROPOFOL 50 MG: 10 INJECTION, EMULSION INTRAVENOUS at 08:03

## 2021-06-22 RX ADMIN — Medication 5 MG: at 08:03

## 2021-06-22 RX ADMIN — PHENYLEPHRINE HYDROCHLORIDE 100 MCG: 10 INJECTION INTRAVENOUS at 08:35

## 2021-06-22 RX ADMIN — ROCURONIUM BROMIDE 10 MG: 10 INJECTION INTRAVENOUS at 08:08

## 2021-06-22 RX ADMIN — GLYCOPYRROLATE 0.6 MG: 0.2 INJECTION, SOLUTION INTRAMUSCULAR; INTRAVENOUS at 08:49

## 2021-06-22 RX ADMIN — Medication 5 MG: at 08:20

## 2021-06-22 RX ADMIN — ONDANSETRON 4 MG: 2 INJECTION INTRAMUSCULAR; INTRAVENOUS at 08:50

## 2021-06-22 RX ADMIN — NEOSTIGMINE METHYLSULFATE 3.5 MG: 1 INJECTION, SOLUTION INTRAVENOUS at 08:49

## 2021-06-22 RX ADMIN — PROPOFOL 50 MG: 10 INJECTION, EMULSION INTRAVENOUS at 08:05

## 2021-06-22 RX ADMIN — PHENYLEPHRINE HYDROCHLORIDE 100 MCG: 10 INJECTION INTRAVENOUS at 08:25

## 2021-06-22 RX ADMIN — PHENYLEPHRINE HYDROCHLORIDE 100 MCG: 10 INJECTION INTRAVENOUS at 08:22

## 2021-06-22 ASSESSMENT — ENCOUNTER SYMPTOMS
SEIZURES: 0
DYSRHYTHMIAS: 0
ORTHOPNEA: 0

## 2021-06-22 ASSESSMENT — MIFFLIN-ST. JEOR: SCORE: 1510.57

## 2021-06-22 ASSESSMENT — COPD QUESTIONNAIRES: CAT_SEVERITY: MILD

## 2021-06-22 NOTE — ANESTHESIA POSTPROCEDURE EVALUATION
Patient: Harrison Thomas    Procedure(s):  BRONCHOSCOPY, ENDOBRONCHIAL ULTRASOUND    Diagnosis:Lung nodule [R91.1]  Mediastinal adenopathy [R59.0]  Diagnosis Additional Information: No value filed.    Anesthesia Type:  General    Note:  Disposition: Outpatient   Postop Pain Control: Uneventful            Sign Out: Well controlled pain   PONV: No   Neuro/Psych: Uneventful            Sign Out: Acceptable/Baseline neuro status   Airway/Respiratory: Uneventful            Sign Out: Acceptable/Baseline resp. status   CV/Hemodynamics: Uneventful            Sign Out: Acceptable CV status   Other NRE: NONE   DID A NON-ROUTINE EVENT OCCUR? No    Event details/Postop Comments:  Pt doing well prior to discharge home.             Last vitals:  Vitals:    06/22/21 1000 06/22/21 1015 06/22/21 1051   BP: (!) 140/71 139/69 135/65   Pulse: 56 53    Resp: 18 18 16   Temp:  35.6  C (96  F)    SpO2: 95% 93% 96%       Last vitals prior to Anesthesia Care Transfer:  CRNA VITALS  6/22/2021 0849 - 6/22/2021 0949      6/22/2021             Pulse:  63    Ht Rate:  63    SpO2:  99 %    Resp Rate (observed):  9    Resp Rate (set):  10          Electronically Signed By: Mike Lynn MD  June 22, 2021  2:42 PM

## 2021-06-22 NOTE — DISCHARGE INSTRUCTIONS
**If you have questions or concerns about your procedure,   call Dr. Marc Terry at [phone]**      Same Day Surgery Discharge Instructions for  Sedation and General Anesthesia       It's not unusual to feel dizzy, light-headed or faint for up to 24 hours after surgery or while taking pain medication.  If you have these symptoms: sit for a few minutes before standing and have someone assist you when you get up to walk or use the bathroom.      You should rest and relax for the next 24 hours. We recommend you make arrangements to have an adult stay with you for at least 24 hours after your discharge.  Avoid hazardous and strenuous activity.      DO NOT DRIVE any vehicle or operate mechanical equipment for 24 hours following the end of your surgery.  Even though you may feel normal, your reactions may be affected by the medication you have received.      Do not drink alcoholic beverages for 24 hours following surgery.       Slowly progress to your regular diet as you feel able. It's not unusual to feel nauseated and/or vomit after receiving anesthesia.  If you develop these symptoms, drink clear liquids (apple juice, ginger ale, broth, 7-up, etc. ) until you feel better.  If your nausea and vomiting persists for 24 hours, please notify your surgeon.        All narcotic pain medications, along with inactivity and anesthesia, can cause constipation. Drinking plenty of liquids and increasing fiber intake will help.      For any questions of a medical nature, call your surgeon.      Do not make important decisions for 24 hours.      If you had general anesthesia, you may have a sore throat for a couple of days related to the breathing tube used during surgery.  You may use Cepacol lozenges to help with this discomfort.  If it worsens or if you develop a fever, contact your surgeon.       If you feel your pain is not well managed with the pain medications prescribed by your surgeon, please contact your surgeon's  office to let them know so they can address your concerns.       CoVid 19 Information    We want to give you information regarding Covid. Please consult your primary care provider with any questions you might have.     Patient who have symptoms (cough, fever, or shortness of breath), need to isolate for 7 days from when symptoms started OR 72 hours after fever resolves (without fever reducing medications) AND improvement of respiratory symptoms (whichever is longer).      Isolate yourself at home (in own room/own bathroom if possible)    Do Not allow any visitors    Do Not go to work or school    Do Not go to [employer],  centers, shopping, or other public places.    Do Not shake hands.    Avoid close and intimate contact with others (hugging, kissing).    Follow CDC recommendations for household cleaning of frequently touched services.     After the initial 7 days, continue to isolate yourself from household members as much as possible. To continue decrease the risk of community spread and exposure, you and any members of your household should limit activities in public for 14 days after starting home isolation.     You can reference the following CDC link for helpful home isolation/care tips:  https://www.cdc.gov/coronavirus/2019-ncov/downloads/10Things.pdf    Protect Others:    Cover Your Mouth and Nose with a mask, disposable tissue or wash cloth to avoid spreading germs to others.    Wash your hands and face frequently with soap and water    Call Your Primary Doctor If: Breathing difficulty develops or you become worse.    For more information about COVID19 and options for caring for yourself at home, please visit the CDC website at https://www.cdc.gov/coronavirus/2019-ncov/about/steps-when-sick.html  For more options for care at [facility], please visit our website at [email or web address]    Discharge Instructions for Bronchoscopy  Recovering at Home  Once home, follow  any instructions you have been given. These include:  Take pain medication as directed.  Do not eat or drink until swallowing returns to normal. As soon as you can swallow comfortably, drink plenty of water.  Use throat lozenges as advised by your doctor to help ease throat soreness.  Rest your voice as instructed by your doctor  Activity as tolerated--frequent short walks followed by rest periods is recommended.  When to Call the Doctor  After you get home, call the doctor if you have any of the following:  Chest pain or trouble breathing (call 911 or other emergency service)  Fever of 100.4 F (38 C) or higher, or as directed by your healthcare provider  Trouble swallowing doesn t improve or gets worse  Pain that does not go away even after taking pain medication  Severely hoarse voice  Severe nausea or vomiting  Bloody vomit  Cough that brings up more than tiny specks of blood   Follow-Up       Follow up with surgeon as instructed.

## 2021-06-22 NOTE — ANESTHESIA CARE TRANSFER NOTE
Patient: Harrison Thomas    Procedure(s):  BRONCHOSCOPY, ENDOBRONCHIAL ULTRASOUND    Diagnosis: Lung nodule [R91.1]  Mediastinal adenopathy [R59.0]  Diagnosis Additional Information: No value filed.    Anesthesia Type:   General     Note:    Oropharynx: oropharynx clear of all foreign objects and spontaneously breathing  Level of Consciousness: drowsy  Oxygen Supplementation: face mask  Level of Supplemental Oxygen (L/min / FiO2): 4  Independent Airway: airway patency satisfactory and stable  Dentition: dentition unchanged  Vital Signs Stable: post-procedure vital signs reviewed and stable  Report to RN Given: handoff report given  Patient transferred to: PACU  Comments: At end of procedure, spontaneous respirations, adequate tidal volumes, followed commands to voice, LMA removed atraumatically, oropharynx suctioned, airway patent after LMA removal. Oxygen via facemask at 4 liters per minute to PACU. Oxygen tubing connected to wall O2 in PACU, SpO2, NiBP, and EKG monitors and alarms on and functioning, Haider Hugger warmer connected to patient gown, report on patient's clinical status given to PACU RN, RN questions answered.  Handoff Report: Identifed the Patient, Identified the Reponsible Provider, Reviewed the pertinent medical history, Discussed the surgical course, Reviewed Intra-OP anesthesia mangement and issues during anesthesia, Set expectations for post-procedure period and Allowed opportunity for questions and acknowledgement of understanding      Vitals: (Last set prior to Anesthesia Care Transfer)  CRNA VITALS  6/22/2021 0849 - 6/22/2021 0924      6/22/2021             Pulse:  63    Ht Rate:  63    SpO2:  99 %    Resp Rate (observed):  9    Resp Rate (set):  10        Electronically Signed By: PREETI Peterson CRNA  June 22, 2021  9:24 AM

## 2021-06-22 NOTE — PROCEDURES
Pre-Operative Diagnosis: PET Avid Right Lung Nodule and Mediastinal Lymph Nodes    Post-Operative Diagnosis: Same    Procedure: Diagnostic Bronchoscopy; Endobronchial Ultrasound (EBUS) with Fine Needle Aspiration    Surgeon: Marc Terry MD    Assistant(s): N/A    Indication for Surgery: Mr. Thomas is a 75M with recent chest wall trauma who was noted to have a lung nodule on CXR. Patient subsequently underwent CT scan and PET/CT which showed a PET avid spiculated right lung nodule with PET avid 2R mediastinal lymph nodes. EBUS w/FNA indicated for diagnostic purposes and potential cancer staging.     Anesthesia: General    Description of Procedure: An LMA was used given the high 2R location of the lymph nodes. The patient was given intermittent paralytics to maintain wide open vocal cords. A bronchoscope was passed through the LMA and vocal cords into the airway. The trachea, mainstem bronchi, lobar and segmental bronchi were all evaluated.  There were no lesions or masses within the airway, and there were no signs of external compression.  There was no inflammation of the airways, and the secretions were thin and nonpurulent.  The bronchoscope was then removed from the airway and the endobronchial ultrasound scope was advanced into the airway.  The endobronchial ultrasound scope was advanced in the airway to the position(s) where the mediastinal adenopathy was expected on preoperative imaging at station(s) 2R.  Endobronchial ultrasound was then performed at station(s) 2R, 4R and 7, with identification of the lymph nodes of interest at 2R (I did not appreciate any enlarged 4R or 7 lymph nodes consistent with PET/CT scan), which were confirmed based on appearance, location, and Doppler evaluation.  Fine-needle aspirations were performed with a 21-gauge needle under ultrasound visualization.  Intraoperative examination of the tissue slides showed adequate tissue sampling at station(s) 2R consistent with isaiah  tissue for evaluation.  Further isaiah biopsies at station(s) 2R were taken and sent for block.    After completion of all endobronchial ultrasound-guided biopsies the EBUS scope was removed and the endobronchial ultrasound balloon was accounted for.  A regular bronchoscope was reintroduced into the airway through the endotracheal tube and the airways were cleared of any small amount of secretions or blood.  There were no signs of bleeding from or within the airway.  The bronchoscope was removed, and the patient was extubated.     Marc Terry MD  Thoracic Surgery  [facility]
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
## 2021-06-23 LAB — COPATH REPORT: NORMAL

## 2021-06-25 ENCOUNTER — APPOINTMENT (OUTPATIENT)
Dept: CT IMAGING | Facility: CLINIC | Age: 76
End: 2021-06-25
Attending: EMERGENCY MEDICINE
Payer: COMMERCIAL

## 2021-06-25 ENCOUNTER — HOSPITAL ENCOUNTER (OUTPATIENT)
Facility: CLINIC | Age: 76
Setting detail: OBSERVATION
Discharge: HOME | End: 2021-06-26
Attending: EMERGENCY MEDICINE | Admitting: INTERNAL MEDICINE
Payer: COMMERCIAL

## 2021-06-25 DIAGNOSIS — K40.30 NON-RECURRENT UNILATERAL INGUINAL HERNIA WITH OBSTRUCTION WITHOUT GANGRENE: ICD-10-CM

## 2021-06-25 LAB
ALBUMIN SERPL-MCNC: 3.9 G/DL (ref 3.4–5)
ALP SERPL-CCNC: 164 U/L (ref 40–150)
ALT SERPL W P-5'-P-CCNC: 23 U/L (ref 0–70)
ANION GAP SERPL CALCULATED.3IONS-SCNC: 5 MMOL/L (ref 3–14)
AST SERPL W P-5'-P-CCNC: 13 U/L (ref 0–45)
BASOPHILS # BLD AUTO: 0 10E9/L (ref 0–0.2)
BASOPHILS NFR BLD AUTO: 0.3 %
BILIRUB SERPL-MCNC: 0.4 MG/DL (ref 0.2–1.3)
BUN SERPL-MCNC: 20 MG/DL (ref 7–30)
CALCIUM SERPL-MCNC: 9.2 MG/DL (ref 8.5–10.1)
CHLORIDE SERPL-SCNC: 104 MMOL/L (ref 94–109)
CO2 SERPL-SCNC: 30 MMOL/L (ref 20–32)
CREAT BLD-MCNC: 1.1 MG/DL (ref 0.66–1.25)
CREAT SERPL-MCNC: 1 MG/DL (ref 0.66–1.25)
DIFFERENTIAL METHOD BLD: NORMAL
EOSINOPHIL # BLD AUTO: 0.1 10E9/L (ref 0–0.7)
EOSINOPHIL NFR BLD AUTO: 0.9 %
ERYTHROCYTE [DISTWIDTH] IN BLOOD BY AUTOMATED COUNT: 13.7 % (ref 10–15)
GFR SERPL CREATININE-BSD FRML MDRD: 66 ML/MIN/{1.73_M2}
GFR SERPL CREATININE-BSD FRML MDRD: 74 ML/MIN/{1.73_M2}
GLUCOSE SERPL-MCNC: 134 MG/DL (ref 70–99)
HCT VFR BLD AUTO: 46.1 % (ref 40–53)
HGB BLD-MCNC: 15 G/DL (ref 13.3–17.7)
IMM GRANULOCYTES # BLD: 0.1 10E9/L (ref 0–0.4)
IMM GRANULOCYTES NFR BLD: 0.5 %
LABORATORY COMMENT REPORT: NORMAL
LACTATE BLD-SCNC: 1.7 MMOL/L (ref 0.7–2)
LYMPHOCYTES # BLD AUTO: 0.9 10E9/L (ref 0.8–5.3)
LYMPHOCYTES NFR BLD AUTO: 9.1 %
MCH RBC QN AUTO: 29.3 PG (ref 26.5–33)
MCHC RBC AUTO-ENTMCNC: 32.5 G/DL (ref 31.5–36.5)
MCV RBC AUTO: 90 FL (ref 78–100)
MONOCYTES # BLD AUTO: 0.7 10E9/L (ref 0–1.3)
MONOCYTES NFR BLD AUTO: 7.5 %
NEUTROPHILS # BLD AUTO: 7.8 10E9/L (ref 1.6–8.3)
NEUTROPHILS NFR BLD AUTO: 81.7 %
NRBC # BLD AUTO: 0 10*3/UL
NRBC BLD AUTO-RTO: 0 /100
PLATELET # BLD AUTO: 208 10E9/L (ref 150–450)
POTASSIUM SERPL-SCNC: 4.1 MMOL/L (ref 3.4–5.3)
PROT SERPL-MCNC: 7.5 G/DL (ref 6.8–8.8)
RBC # BLD AUTO: 5.12 10E12/L (ref 4.4–5.9)
SARS-COV-2 RNA RESP QL NAA+PROBE: NEGATIVE
SODIUM SERPL-SCNC: 139 MMOL/L (ref 133–144)
SPECIMEN SOURCE: NORMAL
WBC # BLD AUTO: 9.5 10E9/L (ref 4–11)

## 2021-06-25 PROCEDURE — 80053 COMPREHEN METABOLIC PANEL: CPT | Performed by: EMERGENCY MEDICINE

## 2021-06-25 PROCEDURE — 87635 SARS-COV-2 COVID-19 AMP PRB: CPT | Performed by: EMERGENCY MEDICINE

## 2021-06-25 PROCEDURE — 258N000003 HC RX IP 258 OP 636: Performed by: EMERGENCY MEDICINE

## 2021-06-25 PROCEDURE — 96375 TX/PRO/DX INJ NEW DRUG ADDON: CPT

## 2021-06-25 PROCEDURE — 99222 1ST HOSP IP/OBS MODERATE 55: CPT | Performed by: SURGERY

## 2021-06-25 PROCEDURE — 250N000011 HC RX IP 250 OP 636: Performed by: EMERGENCY MEDICINE

## 2021-06-25 PROCEDURE — 250N000009 HC RX 250: Performed by: EMERGENCY MEDICINE

## 2021-06-25 PROCEDURE — G0378 HOSPITAL OBSERVATION PER HR: HCPCS

## 2021-06-25 PROCEDURE — 85025 COMPLETE CBC W/AUTO DIFF WBC: CPT | Performed by: EMERGENCY MEDICINE

## 2021-06-25 PROCEDURE — 99220 PR INITIAL OBSERVATION CARE,LEVEL III: CPT | Performed by: INTERNAL MEDICINE

## 2021-06-25 PROCEDURE — 96374 THER/PROPH/DIAG INJ IV PUSH: CPT | Mod: 59

## 2021-06-25 PROCEDURE — 82565 ASSAY OF CREATININE: CPT

## 2021-06-25 PROCEDURE — 83605 ASSAY OF LACTIC ACID: CPT | Performed by: EMERGENCY MEDICINE

## 2021-06-25 PROCEDURE — 96376 TX/PRO/DX INJ SAME DRUG ADON: CPT

## 2021-06-25 PROCEDURE — 99285 EMERGENCY DEPT VISIT HI MDM: CPT | Mod: 25

## 2021-06-25 PROCEDURE — 74177 CT ABD & PELVIS W/CONTRAST: CPT

## 2021-06-25 PROCEDURE — C9803 HOPD COVID-19 SPEC COLLECT: HCPCS

## 2021-06-25 RX ORDER — HYDROMORPHONE HYDROCHLORIDE 1 MG/ML
0.5 INJECTION, SOLUTION INTRAMUSCULAR; INTRAVENOUS; SUBCUTANEOUS
Status: COMPLETED | OUTPATIENT
Start: 2021-06-25 | End: 2021-06-25

## 2021-06-25 RX ORDER — FENTANYL CITRATE 50 UG/ML
100 INJECTION, SOLUTION INTRAMUSCULAR; INTRAVENOUS ONCE
Status: COMPLETED | OUTPATIENT
Start: 2021-06-25 | End: 2021-06-25

## 2021-06-25 RX ORDER — ONDANSETRON 2 MG/ML
4 INJECTION INTRAMUSCULAR; INTRAVENOUS EVERY 30 MIN PRN
Status: DISCONTINUED | OUTPATIENT
Start: 2021-06-25 | End: 2021-06-26

## 2021-06-25 RX ORDER — SODIUM CHLORIDE 9 MG/ML
INJECTION, SOLUTION INTRAVENOUS CONTINUOUS
Status: DISCONTINUED | OUTPATIENT
Start: 2021-06-25 | End: 2021-06-26

## 2021-06-25 RX ORDER — IOPAMIDOL 755 MG/ML
85 INJECTION, SOLUTION INTRAVASCULAR ONCE
Status: COMPLETED | OUTPATIENT
Start: 2021-06-25 | End: 2021-06-25

## 2021-06-25 RX ADMIN — SODIUM CHLORIDE: 9 INJECTION, SOLUTION INTRAVENOUS at 23:54

## 2021-06-25 RX ADMIN — SODIUM CHLORIDE 1000 ML: 9 INJECTION, SOLUTION INTRAVENOUS at 21:28

## 2021-06-25 RX ADMIN — ONDANSETRON 4 MG: 2 INJECTION INTRAMUSCULAR; INTRAVENOUS at 21:28

## 2021-06-25 RX ADMIN — MIDAZOLAM HYDROCHLORIDE 1 MG: 1 INJECTION, SOLUTION INTRAMUSCULAR; INTRAVENOUS at 22:24

## 2021-06-25 RX ADMIN — IOPAMIDOL 85 ML: 755 INJECTION, SOLUTION INTRAVENOUS at 21:31

## 2021-06-25 RX ADMIN — SODIUM CHLORIDE 64 ML: 9 INJECTION, SOLUTION INTRAVENOUS at 21:31

## 2021-06-25 RX ADMIN — HYDROMORPHONE HYDROCHLORIDE 0.5 MG: 1 INJECTION, SOLUTION INTRAMUSCULAR; INTRAVENOUS; SUBCUTANEOUS at 21:28

## 2021-06-25 RX ADMIN — FENTANYL CITRATE 100 MCG: 50 INJECTION INTRAMUSCULAR; INTRAVENOUS at 22:24

## 2021-06-25 RX ADMIN — HYDROMORPHONE HYDROCHLORIDE 0.5 MG: 1 INJECTION, SOLUTION INTRAMUSCULAR; INTRAVENOUS; SUBCUTANEOUS at 21:55

## 2021-06-25 RX ADMIN — HYDROMORPHONE HYDROCHLORIDE 0.5 MG: 1 INJECTION, SOLUTION INTRAMUSCULAR; INTRAVENOUS; SUBCUTANEOUS at 22:33

## 2021-06-25 ASSESSMENT — ENCOUNTER SYMPTOMS
ABDOMINAL DISTENTION: 0
VOMITING: 0
FEVER: 0
NAUSEA: 0
CONSTIPATION: 0

## 2021-06-25 ASSESSMENT — MIFFLIN-ST. JEOR: SCORE: 1522.36

## 2021-06-26 VITALS
DIASTOLIC BLOOD PRESSURE: 69 MMHG | HEART RATE: 55 BPM | HEIGHT: 70 IN | TEMPERATURE: 98.5 F | BODY MASS INDEX: 24.34 KG/M2 | WEIGHT: 170 LBS | SYSTOLIC BLOOD PRESSURE: 143 MMHG | RESPIRATION RATE: 20 BRPM | OXYGEN SATURATION: 96 %

## 2021-06-26 PROCEDURE — 99217 PR OBSERVATION CARE DISCHARGE: CPT | Performed by: INTERNAL MEDICINE

## 2021-06-26 PROCEDURE — 99231 SBSQ HOSP IP/OBS SF/LOW 25: CPT | Performed by: SURGERY

## 2021-06-26 PROCEDURE — G0378 HOSPITAL OBSERVATION PER HR: HCPCS

## 2021-06-26 PROCEDURE — 96361 HYDRATE IV INFUSION ADD-ON: CPT

## 2021-06-26 PROCEDURE — 258N000003 HC RX IP 258 OP 636: Performed by: INTERNAL MEDICINE

## 2021-06-26 RX ORDER — ACETAMINOPHEN 650 MG/1
650 SUPPOSITORY RECTAL EVERY 4 HOURS PRN
Status: DISCONTINUED | OUTPATIENT
Start: 2021-06-26 | End: 2021-06-26 | Stop reason: HOSPADM

## 2021-06-26 RX ORDER — ONDANSETRON 2 MG/ML
4 INJECTION INTRAMUSCULAR; INTRAVENOUS EVERY 6 HOURS PRN
Status: DISCONTINUED | OUTPATIENT
Start: 2021-06-26 | End: 2021-06-26 | Stop reason: HOSPADM

## 2021-06-26 RX ORDER — ONDANSETRON 4 MG/1
4 TABLET, ORALLY DISINTEGRATING ORAL EVERY 6 HOURS PRN
Status: DISCONTINUED | OUTPATIENT
Start: 2021-06-26 | End: 2021-06-26 | Stop reason: HOSPADM

## 2021-06-26 RX ORDER — SODIUM CHLORIDE, SODIUM LACTATE, POTASSIUM CHLORIDE, CALCIUM CHLORIDE 600; 310; 30; 20 MG/100ML; MG/100ML; MG/100ML; MG/100ML
INJECTION, SOLUTION INTRAVENOUS CONTINUOUS
Status: DISCONTINUED | OUTPATIENT
Start: 2021-06-26 | End: 2021-06-26

## 2021-06-26 RX ORDER — LEVALBUTEROL 1.25 MG/.5ML
1.25 SOLUTION, CONCENTRATE RESPIRATORY (INHALATION) EVERY 6 HOURS PRN
Status: DISCONTINUED | OUTPATIENT
Start: 2021-06-26 | End: 2021-06-26 | Stop reason: HOSPADM

## 2021-06-26 RX ORDER — ACETAMINOPHEN 325 MG/1
650 TABLET ORAL EVERY 4 HOURS PRN
Status: DISCONTINUED | OUTPATIENT
Start: 2021-06-26 | End: 2021-06-26 | Stop reason: HOSPADM

## 2021-06-26 RX ADMIN — SODIUM CHLORIDE, POTASSIUM CHLORIDE, SODIUM LACTATE AND CALCIUM CHLORIDE: 600; 310; 30; 20 INJECTION, SOLUTION INTRAVENOUS at 00:48

## 2021-06-26 NOTE — ED NOTES
[facility]  ED Nurse Handoff Report    ED Chief complaint: Groin Pain (Left groin pain started this AM. Pt reports lump in groin. Had left hip replacement done in April. No difficulty with constipation at this time.)      ED Diagnosis:   Final diagnoses:   Non-recurrent unilateral inguinal hernia with obstruction without gangrene       Code Status: Full Code    Allergies:   Allergies   Allergen Reactions     Levaquin Difficulty breathing     Plavix [Clopidogrel Bisulfate] Itching     Atorvastatin Calcium Cramps     lipitor     Cats      Dogs      Hctz [Hydrochlorothiazide]      Rash on legs     Sulfasalazine Other (See Comments)     Stomach cramps        Patient Story: Left groin pain started this AM. Pt reports lump in groin. Had left hip replacement done in April. No difficulty with constipation at this time.  Focused Assessment:  75 year old male with a history of CAD< hypertension, hyperlipidemia, MI, TIA and COPD who presents with groin pain. The patient reports that this morning he noticed a lump on his left groin and reports pain. He denies any significant constipation. The patient denies any fever, abdominal pain, nausea, vomiting or any other symptoms. Review of SystemsConstitutional: Negative for fever.   Gastrointestinal: Negative for abdominal distention, constipation, nausea and vomiting.Left groin lump   All other systems reviewed and are negative.  Patient is alert and oriented X4.  Pleasant and cooperative.    Treatments and/or interventions provided: pain medication IV X3 doses dilaudid, IV Versed 1mg and 100mcg of fetanyl, IV NS, imaging, monitoring.  Patient's response to treatments and/or interventions: fair    To be done/followed up on inpatient unit:  continuation of MD orders    Does this patient have any cognitive concerns?: nnone    Activity level - Baseline/Home:  Independent  Activity Level - Current:   Independent    Patient's Preferred language: English    "Needed?: No    Isolation: None  Infection: Not Applicable  Patient tested for COVID 19 prior to admission: YES  Bariatric?: No    Vital Signs:   Vitals:    06/25/21 2043   BP: (!) 168/84   Pulse: 100   Resp: 20   Temp: 96.7  F (35.9  C)   TempSrc: Temporal   SpO2: 95%   Weight: 77.1 kg (170 lb)   Height: 1.778 m (5' 10\")       Cardiac Rhythm:     Was the PSS-3 completed:   Yes  What interventions are required if any?               Family Comments: none  OBS brochure/video discussed/provided to patient/family: N/A              Name of person given brochure if not patient: n/a              Relationship to patient: n/a    For the majority of the shift this patient's behavior was Green.   Behavioral interventions performed were none.    ED NURSE PHONE NUMBER: *[phone]         "
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
## 2021-06-26 NOTE — PROGRESS NOTES
RECEIVING UNIT ED HANDOFF REVIEW    ED Nurse Handoff Report was reviewed by: Isai Lion RN on June 25, 2021 at 11:17 PM
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
## 2021-06-26 NOTE — PLAN OF CARE
Arrived to floor at 0000. A&O x4. VSS on RA. Lung sounds clear. NPO. Bowel sounds active + flatus. Voiding adequately. Tylenol available for pain. Denies nausea. Up with SBA.

## 2021-06-26 NOTE — ED TRIAGE NOTES
Left groin pain started this AM. Pt reports lump in groin. Had left hip replacement done in April. No difficulty with constipation at this time.

## 2021-06-26 NOTE — PROGRESS NOTES
Discharge criteria met. Discharge order received. All discharge instructions reviewed with pt. Pt verbalized understanding. All questions answered. PIV removed. No Rx to fill. All personal belongs returned to pt. Pt left the unit via W/C in stable condition accompanied with staff. Pt's sister waiting for pt at door #6.

## 2021-06-26 NOTE — ED PROVIDER NOTES
"  History     Chief Complaint:  Groin Pain     The history is provided by the patient.      Harrison Thomas is a 75 year old male with a history of CAD< hypertension, hyperlipidemia, MI, TIA and COPD who presents with groin pain. The patient reports that this morning he noticed a lump in his left groin and reports pain. He denies any significant constipation. The patient denies any fever, other abdominal pain, nausea, vomiting or any other symptoms.  He indicates that he had a bowel movement earlier today as well.    Review of Systems   Constitutional: Negative for fever.   Gastrointestinal: Negative for abdominal distention, constipation, nausea and vomiting.   Skin:        Left groin lump   All other systems reviewed and are negative.      Allergies:  Levaquin  Plavix   Atorvastatin Calcium  Cats  Dogs  hydrochlorothiazide   Sulfasalazine    Medications:    Albuterol   Fosamax   Amlodipine    Baby aspirin   Trelegy   Folvite   Lasix   Lisinopril   Tapazole   Metoprolol  Nitrostat  Dalirep   Crestor   Viagra   umeclidinium      Past Medical History:    CAD  COPD   Hypertension  Hyperlipidemia   MI   BRENNEN  TIA   RA   BPH     Past Surgical History:    Bronchoscopy   Liver resection   Cardiac stent   Eye surgery   Angioplasty   Prostate laser   Right knee arthroscopy     Family History:    CAD- mother   Diabetes- mother   Hypertension- mother   Hyperlipidemia- mother   Cerebrovascular disease- mother   Cancer- mother   Depression- mother   Asthma- mother   OA- mother   Thyroid disease- mother   B cell lymphoma- daughter   Non-hodgkin- daughter     Social History:  Presents alone   Former smoker     Physical Exam     Patient Vitals for the past 24 hrs:   BP Temp Temp src Pulse Resp SpO2 Height Weight   06/25/21 2043 (!) 168/84 96.7  F (35.9  C) Temporal 100 20 95 % 1.778 m (5' 10\") 77.1 kg (170 lb)       Physical Exam  Nursing note and vitals reviewed.  Constitutional:  Oriented to person, place, and time. " Cooperative.  Appears uncomfortable.  HENT:   Nose:    Nose normal.   Mouth/Throat:   Facemask in place.   Eyes:    Conjunctivae normal and EOM are normal.      Pupils are equal, round, and reactive to light.   Neck:    Trachea normal.   Cardiovascular:  Normal rate, regular rhythm, normal heart sounds and normal pulses. No murmur heard.  Pulmonary/Chest:  Effort normal and breath sounds normal.   Abdominal:   Hernia present in the left inguinal region which is quite tender to palpation.  Nontender elsewhere.   Musculoskeletal:  Extremities atraumatic x 4.   Lymphadenopathy:  No cervical adenopathy.   Neurological:   Alert and oriented to person, place, and time. Normal strength.      No cranial nerve deficit or sensory deficit. GCS eye subscore is 4. GCS verbal subscore is 5. GCS motor subscore is 6.   Skin:    Skin is intact. No rash noted.   Psychiatric:   Normal mood and affect.      Emergency Department Course     Imaging:    CT abdomen Pelvis with contrast:  1.  Obstructing left inguinal hernia containing a loop of sigmoid   colon.   2.  Multiple renal cortical cysts. No urinary tract calculi or   hydronephrosis.   3.  Splenic hypodensities as previously described.   Reading per radiology    Laboratory:  Creatinine POCT: 1.1    CBC: WBC 9.5, HGB 15.0,      CMP: glucose 134 (H) alkphos 164 (H) o/w WNL (Creatinine 1.00)     Lactic acid (result time 2130) 1.7      Asymptomatic COVID19 Virus PCR by nasopharyngeal swab pending     Emergency Department Course:    Reviewed:  I reviewed nursing notes, vitals, past history and care everywhere    Assessments:  2110 I obtained history and examined the patient as noted above.     2220 I rechecked the patient and explained findings.     Consults:   2213 I spoke with Dr. Harden of general surgery regarding patient's presentation, findings, and plan of care.     2238 I spoke with Dr. Massey of the Hospitalist service from [facility] regarding patient's  presentation, findings, and plan of care.    Interventions:  2128 NS, 1 L, IV    2128 Zofran 4 mg IV     2128 Dilaudid 0.5 mg IV     2155 Dilaudid 0.5 mg IV    2224 Versed 1 mg IV     2224 Fentanyl 100 mcg IV     Disposition:  The patient was admitted to the hospital under the care of Dr. Massey.    Impression & Plan        Medical Decision Making:  This is a 75-year-old male who came in for further evaluation of what appeared to be an inguinal hernia.  Even though he was not nauseous and said he had a bowel movement earlier today, I was still concerned he might have a bowel obstruction from this due to how uncomfortable he was.  Therefore I proceeded with the above work-up including the blood work and CT scan.  He does have a loop of sigmoid colon in the hernia as seen on CT.  I subsequently spoke with the on-call general surgeon, Dr. Khan, who came and saw the patient.  She was able to reduce this here in the ER with IV fentanyl and Versed.  She asked that we bring him into the hospital for observation as well, in case this worsens again and he requires emergent surgery.  Therefore I subsequently spoke with Dr. Massey, who will be taking care of him.    Covid-19  Harrison Thomas was evaluated during a global COVID-19 pandemic, which necessitated consideration that the patient might be at risk for infection with the SARS-CoV-2 virus that causes COVID-19.   Applicable protocols for evaluation were followed during the patient's care.   COVID-19 was considered as part of the patient's evaluation. The plan for testing is:  a test was obtained during this visit.    Diagnosis:    ICD-10-CM    1. Non-recurrent unilateral inguinal hernia with obstruction without gangrene  K40.30        Scribe Disclosure:  I, Jennifer Castillo, am serving as a scribe at 9:10 PM on 6/25/2021 to document services personally performed by Titi Charles MD based on my observations and the provider's statements to me.       Titi Charles MD  06/25/21 [id number]

## 2021-06-26 NOTE — ED NOTES
Bed: ED06  Expected date:   Expected time:   Means of arrival:   Comments:  Hold for triage groin pain

## 2021-06-26 NOTE — PROGRESS NOTES
"General Surgery Progress Note    Subjective: Harrison reports he is doing fine. He has had several large loose bowel movements overnight. Mild soreness in left groin. No nausea.     Objective: BP (!) 159/75 (BP Location: Left arm)   Pulse 52   Temp 97.5  F (36.4  C) (Oral)   Resp 20   Ht 1.778 m (5' 10\")   Wt 77.1 kg (170 lb)   SpO2 96%   BMI 24.39 kg/m    Gen: alert, no distress  CV: RRR  Pulm: nonlabored breathing  Abd: soft, nt, nd  : left groin - hernia remains reduced, mildly tender with exam  Ext: WWP    Assessment/Plan:   Harrison Thomas  is a 75 year old male admitted with left inguinal hernia causing large bowel obstruction s/p reduction in ER. Remains reduced this morning. Discussed elective repair. I would like to see him in clinic in the next week or two (or he can see one of my partners) to discuss repair further and schedule electively. He is schedule for surgery with Dr. Dumont this week - it is fine to keep this scheduled. If he feels a bulge in his groin, instructed on how to reduce. If unable to reduce and very painful - needs to go to ER.   - advance diet  - ok to discharge today  - surgery follow up orders placed.     Genet Harden MD  [facility]  [phone]              "

## 2021-06-26 NOTE — DISCHARGE SUMMARY
[facility]    Discharge Summary  Hospitalist    Date of Admission:  6/25/2021  Date of Discharge:  6/26/2021  Discharging Provider: Glenna Mujica    Discharge Diagnoses   Obstructing left inguinal hernia, reduced on 6/25/21 per Dr. Harden  -------------------------------  Obstructive sleep apnea, intolerant of CPAP   CAD with prior MI and stent placement  Hypertension  Hyperlipidemia  COPD  Rheumatoid arthritis  Hyperthyroidism  Pulmonary nodules  Hx of L hip hemiarthroplasty in 4/2021 for displaced femoral neck fracture    History of Present Illness   Harrison Thomas is an 75 year old male with PMHx of CAD, hypertension, hyperlipidemia, COPD (not O2 dependent), RA, hypothyroidism and pulmonary nodules who presented to the ED on 6/25/21 with left groin pain secondary to an obstructing left inguinal hernia. Hernia was reduced in the ED per Dr. Harden. He was admitted under observation status on 6/25/2021 for continued monitoring.     Hospital Course   Harrison Thomas was admitted on 6/25/2021.  The following problems were addressed during his hospitalization:    Obstructing left inguinal hernia, reduced on 6/25/21 per Dr. Harden  * Presented to ED with acute onset left groin pain. CT abd/pelvis showed an obstructing left inguinal hernia containing a loop of sigmoid colon. General surgery saw patient in ED and was able to reduce at bedside with sedation. Recommended observation overnight with reassessment in the morning to ensure he did not need more urgent repair.   * Condition remained stable. Tolerating po intake, moving bowels.   * Will follow up with Dr. Harden in clinic.      Obstructive sleep apnea, intolerant of CPAP   Stable. No concerns     CAD with prior MI and stent placement  Hypertension  Hyperlipidemia  Denies any chest pain. No acute issues.   Stable on home meds including antihypertensives, ASA and statin.      COPD  No acute issues. Remain  stable on inhalers.      Rheumatoid arthritis  Chronic and stable on methotrexate     Hyperthyroidism  Chronic and stable on methimazole     Pulmonary nodules  Recently underwent EBUS with biopsies on 6/22/21, pathology appears non-diagnostic.   Continue OP follow up. Reportedly scheduled for an upcoming surgery with Dr. Dumont. I don't see any specifics of this in the chart. Okay to proceed with procedures per Dr. Harden.     Hx of L hip hemiarthroplasty in 4/2021 for displaced femoral neck fracture  Cont PT as per prior to admission    Glenna Mujica DO    Code Status   Full Code       Primary Care Physician   Yaneli Dozier    Physical Exam   Temp: 97.5  F (36.4  C) Temp src: Oral BP: (!) 159/75 Pulse: 52   Resp: 20 SpO2: 96 % O2 Device: None (Room air)    Vitals:    06/25/21 2043   Weight: 77.1 kg (170 lb)     Vital Signs with Ranges  Temp:  [96.7  F (35.9  C)-98.1  F (36.7  C)] 97.5  F (36.4  C)  Pulse:  [] 52  Resp:  [18-20] 20  BP: (148-168)/(75-91) 159/75  SpO2:  [95 %-96 %] 96 %  I/O last 3 completed shifts:  In: 650 [I.V.:650]  Out: -     General: Resting comfortably, alert, conversive, NAD  CVS: HRRR, no MGR, no LE edema  Respiratory: CTAB, no wheeze/rales/rhonchi, no increased work of breathing  GI: S, NT, ND, +BS  Skin: Warm/dry  Neuro: CNs 2-12 intact, no focal motor/sensory deficits    Discharge Disposition   Discharged to home  Condition at discharge: Stable    Consultations This Hospital Stay   SURGERY GENERAL IP CONSULT    Time Spent on this Encounter   IGlenna DO, personally saw the patient today and spent greater than 30 minutes discharging this patient.    Discharge Orders      Follow-up and recommended labs and tests     Please call [phone] to schedule an appt with either Dr. Harden or one of her partners with [facility] in the next 1-2 weeks to discuss and schedule an elective left inguinal hernia repair.    If hernia returns, try to  reduce as instructed. If unable to reduce and painful - need to be seen in ER.     Reason for your hospital stay    Evaluation of your hernia, which was reduced in the ED.     Follow-up and recommended labs and tests     Follow up with your providers as scheduled.     Activity    Your activity upon discharge: activity as tolerated     Discharge Instructions    If your symptoms recur and you are unable to reduce your hernia, you should return to the ED     Diet    Follow this diet upon discharge: Regular diet     Discharge Medications   Current Discharge Medication List      CONTINUE these medications which have NOT CHANGED    Details   albuterol (PROAIR HFA) 108 (90 Base) MCG/ACT inhaler INHALE 2 PUFFS INTO THE LUNGS EVERY 6 HOURS AS NEEDED FOR SHORTNESS OF BREATH / DYSPNEA OR WHEEZING  Qty: 25.5 g, Refills: 0    Comments: Pharmacy may dispense brand covered by insurance (Proair, or proventil or ventolin or generic albuterol inhaler)  Associated Diagnoses: COPD exacerbation (H)      alendronate (FOSAMAX) 70 MG tablet Take 1 tablet (70 mg) by mouth every 7 days ; take with 8oz glass of water on empty stomach, remain upright and do not eat for 45 minutes.  Qty: 12 tablet, Refills: 3    Associated Diagnoses: Osteopenia, unspecified location      amLODIPine (NORVASC) 5 MG tablet Take 1 tablet (5 mg) by mouth At Bedtime  Qty: 180 tablet, Refills: 3    Associated Diagnoses: Benign essential hypertension      aspirin 81 MG tablet Take 1 tablet by mouth 2 times daily      Carisoprodol 250 MG TABS TAKE 1 TABLET BY MOUTH DAILY AS NEEDED FOR BACK ISSUE  Qty: 31 tablet, Refills: 1    Comments: Not to exceed 2 additional fills before 05/04/2021 DX Code Needed  .  Associated Diagnoses: Back muscle spasm      FISH OIL 1000 MG OR CAPS take one tablet twice daily      Fluticasone-Umeclidin-Vilanterol (TRELEGY ELLIPTA) 100-62.5-25 MCG/INH oral inhaler Inhale 1 puff into the lungs daily  Qty: 90 each, Refills: 3    Associated  Diagnoses: Obstructive chronic bronchitis with exacerbation (H)      folic acid (FOLVITE) 1 MG tablet Take 2 tablets (2 mg) by mouth daily  Qty: 180 tablet, Refills: 1    Associated Diagnoses: Rheumatoid arthritis involving multiple sites with positive rheumatoid factor (H)      furosemide (LASIX) 20 MG tablet Take 1 tablet (20 mg) by mouth 2 times daily  Qty: 180 tablet, Refills: 3    Associated Diagnoses: Edema, unspecified type      guaiFENesin (MUCINEX) 600 MG 12 hr tablet Take 2 tablets (1,200 mg) by mouth 2 times daily  Qty: 180 tablet, Refills: 3    Associated Diagnoses: Chronic obstructive pulmonary disease with acute exacerbation (H)      HYDROcodone-acetaminophen (NORCO) 5-325 MG tablet Take 1 tablet by mouth 3 times daily as needed for moderate to severe pain  Qty: 90 tablet, Refills: 0    Associated Diagnoses: Spinal stenosis of lumbar region with neurogenic claudication      levalbuterol (XOPENEX) 0.31 MG/3ML neb solution INHALE 1 VIAL (3ML) BY NEBULIZATION EVERY 4 HOURS AS NEEDED FOR WHEEZING OR SHORTNESS OF BREATH.  Qty: 720 mL, Refills: 0    Comments: Medication filled 1 time as pt is due for a follow-up in clinic. Patient is already scheduled for upcoming appointment. Further refills at appt  Associated Diagnoses: COPD exacerbation (H)      lidocaine (LIDODERM) 5 % patch Place 1 patch onto the skin every 24 hours To prevent lidocaine toxicity, patient should be patch free for 12 hrs daily.  Qty: 31 patch, Refills: 1    Associated Diagnoses: Traumatic injury of rib      lisinopril (ZESTRIL) 40 MG tablet TAKE 1 TABLET BY MOUTH EVERY DAY  Qty: 90 tablet, Refills: 3    Associated Diagnoses: Benign essential hypertension      methimazole (TAPAZOLE) 5 MG tablet Take 5 mg by mouth Take one tablet daily on Monday and Thursday      methotrexate 2.5 MG tablet Take 5 tablets (12.5 mg) by mouth every 7 days . Labs required every 8-12 weeks.  Qty: 65 tablet, Refills: 0    Associated Diagnoses: Rheumatoid  arthritis involving multiple sites with positive rheumatoid factor (H)      metoprolol succinate ER (TOPROL-XL) 50 MG 24 hr tablet Take 1 tablet (50 mg) by mouth daily (take with 25mg tablet for total 75mg daily)  Qty: 90 tablet, Refills: 2    Associated Diagnoses: Benign essential hypertension      Multiple Vitamins-Minerals (MULTIVITAMIN OR) Take 1 tablet by mouth daily       nitroGLYcerin (NITROSTAT) 0.4 MG sublingual tablet For chest pain place 1 tablet under the tongue every 5 minutes for 3 doses. If symptoms persist 5 minutes after 1st dose call 911.  Qty: 21 tablet, Refills: 0    Associated Diagnoses: Coronary artery disease involving native coronary artery of native heart without angina pectoris      !! order for DME Equipment being ordered: Oxygen  Please provide O2 continuous via NC from PORTABLE O2 concentrator for overnight usage at 2 LPM  Qty: 1 Device, Refills: 0    Associated Diagnoses: Chronic bronchitis, unspecified chronic bronchitis type (H); BRENNEN (obstructive sleep apnea); Nocturnal hypoxemia      !! order for DME Equipment being ordered: flutter valve  Incentive spirometer  Qty: 1 each, Refills: 0    Associated Diagnoses: Pneumonia of right upper lobe due to infectious organism; Closed fracture of seven ribs of left side, initial encounter      roflumilast (DALIRESP) 500 MCG TABS tablet Take 1 tablet (500 mcg) by mouth daily  Qty: 31 tablet, Refills: 3    Associated Diagnoses: Chronic obstructive pulmonary disease, unspecified COPD type (H)      rosuvastatin (CRESTOR) 10 MG tablet TAKE 1 TABLET BY MOUTH EVERY DAY  Qty: 90 tablet, Refills: 3    Associated Diagnoses: Hyperlipidemia LDL goal <100      sildenafil (VIAGRA) 100 MG tablet Take 1 tablet (100 mg) by mouth daily as needed (prn)  Qty: 20 tablet, Refills: 6    Associated Diagnoses: Benign prostatic hyperplasia with incomplete bladder emptying      umeclidinium (INCRUSE ELLIPTA) 62.5 MCG/INH inhaler Inhale 1 puff into the lungs daily  Qty: 1  Inhaler, Refills: 1    Associated Diagnoses: COPD exacerbation (H)       !! - Potential duplicate medications found. Please discuss with provider.        Allergies   Allergies   Allergen Reactions     Levaquin Difficulty breathing     Plavix [Clopidogrel Bisulfate] Itching     Atorvastatin Calcium Cramps     lipitor     Cats      Dogs      Hctz [Hydrochlorothiazide]      Rash on legs     Sulfasalazine Other (See Comments)     Stomach cramps      Data   Most Recent 3 CBC's:  Recent Labs   Lab Test 06/25/21 2120 06/03/21 0936 05/10/21  0817   WBC 9.5 6.9 9.3   HGB 15.0 13.0* 12.9*   MCV 90 93 94    229 301      Most Recent 3 BMP's:  Recent Labs   Lab Test 06/25/21 2120 06/22/21  0639 06/03/21  0936 05/10/21  0817     --  142 139   POTASSIUM 4.1 4.5 4.5 5.0   CHLORIDE 104  --  109 106   CO2 30  --  29 30   BUN 20  --  11 17   CR 1.00  --  0.85 0.88   ANIONGAP 5  --  4 3   KARLIE 9.2  --  9.2 9.6   *  --  87 102*     Most Recent 2 LFT's:  Recent Labs   Lab Test 06/25/21 2120 06/03/21  0936   AST 13 12   ALT 23 16   ALKPHOS 164* 154*   BILITOTAL 0.4 0.6     Most Recent TSH, T4 and A1c Labs:  Recent Labs   Lab Test 06/03/21  0936 02/18/19  1055 02/18/19  1055   TSH 0.92   < > 0.34*   T4  --   --  1.46    < > = values in this interval not displayed.     Results for orders placed or performed during the hospital encounter of 06/25/21   CT Abdomen Pelvis w Contrast    Narrative    CT ABDOMEN PELVIS WITH CONTRAST 6/25/2021 9:42 PM    CLINICAL HISTORY: Abdominal pain.    TECHNIQUE: CT scan of the abdomen and pelvis was performed following  injection of IV contrast. Multiplanar reformats were obtained. Dose  reduction techniques were used.  CONTRAST: 85mL Isovue-370    COMPARISON: PET/CT 6/14/2021.    FINDINGS:   LOWER CHEST: A few tiny lung nodules are noted at the lung bases as  previously described. No pleural fluid.    HEPATOBILIARY: Calcification noted along the posterior right hepatic  dome. Liver is  otherwise unremarkable. Gallbladder is within normal  limits.    PANCREAS: Normal.    SPLEEN: Indeterminate low-attenuation splenic lesions are  indeterminate. Probable old subcapsular splenic injury on image 14 of  series 3. No surrounding hemorrhage or other evidence of acute injury.    ADRENAL GLANDS: Normal.    KIDNEYS/BLADDER: A few low-attenuation renal cortical lesions are  present most likely cysts. No hydronephrosis or urinary tract calculi.    BOWEL: No evidence of diverticulitis. Appendix is normal. Loop of  sigmoid colon noted in a left inguinal hernia with proximal bowel  dilatation and fecal distention concerning for an obstructing left  inguinal hernia.    LYMPH NODES: No enlarged abdominal or pelvic lymph nodes.    VASCULATURE: Calcified plaque abdominal aorta without aneurysm or  dissection.    PELVIC ORGANS: Portions of the pelvis are obscured by artifact from  left hip replacement hardware. Bladder and rectum are otherwise within  normal limits where visualized.    ADDITIONAL FINDINGS: None.    MUSCULOSKELETAL: Normal.      Impression    IMPRESSION:   1.  Obstructing left inguinal hernia containing a loop of sigmoid  colon.    2.  Multiple renal cortical cysts. No urinary tract calculi or  hydronephrosis.    3.  Splenic hypodensities as previously described.    TIA DELONG MD

## 2021-06-28 DIAGNOSIS — Z11.59 ENCOUNTER FOR SCREENING FOR OTHER VIRAL DISEASES: ICD-10-CM

## 2021-06-29 ENCOUNTER — TELEPHONE (OUTPATIENT)
Dept: FAMILY MEDICINE | Facility: CLINIC | Age: 76
End: 2021-06-29

## 2021-06-29 DIAGNOSIS — S29.9XXA TRAUMATIC INJURY OF RIB: ICD-10-CM

## 2021-06-29 DIAGNOSIS — Z11.59 ENCOUNTER FOR SCREENING FOR OTHER VIRAL DISEASES: ICD-10-CM

## 2021-06-29 DIAGNOSIS — J44.1 COPD EXACERBATION (H): ICD-10-CM

## 2021-06-29 LAB
SARS-COV-2 RNA RESP QL NAA+PROBE: NORMAL
SPECIMEN SOURCE: NORMAL

## 2021-06-29 PROCEDURE — U0003 INFECTIOUS AGENT DETECTION BY NUCLEIC ACID (DNA OR RNA); SEVERE ACUTE RESPIRATORY SYNDROME CORONAVIRUS 2 (SARS-COV-2) (CORONAVIRUS DISEASE [COVID-19]), AMPLIFIED PROBE TECHNIQUE, MAKING USE OF HIGH THROUGHPUT TECHNOLOGIES AS DESCRIBED BY CMS-2020-01-R: HCPCS | Performed by: THORACIC SURGERY (CARDIOTHORACIC VASCULAR SURGERY)

## 2021-06-29 PROCEDURE — U0005 INFEC AGEN DETEC AMPLI PROBE: HCPCS | Performed by: THORACIC SURGERY (CARDIOTHORACIC VASCULAR SURGERY)

## 2021-06-29 NOTE — TELEPHONE ENCOUNTER
Non-Recurrent Unilateral Inguinal Hernia With Obstruction Without Gangrene,SAT 26-JUN-2021 ,ed/ip 0 / 0    [phone] (home)

## 2021-06-30 LAB
LABORATORY COMMENT REPORT: NORMAL
SARS-COV-2 RNA RESP QL NAA+PROBE: NEGATIVE
SPECIMEN SOURCE: NORMAL

## 2021-06-30 RX ORDER — LIDOCAINE 50 MG/G
1 PATCH TOPICAL EVERY 24 HOURS
Qty: 90 PATCH | Refills: 3 | Status: SHIPPED | OUTPATIENT
Start: 2021-06-30 | End: 2021-07-01

## 2021-06-30 RX ORDER — LEVALBUTEROL INHALATION SOLUTION 0.31 MG/3ML
SOLUTION RESPIRATORY (INHALATION)
Qty: 720 ML | Refills: 0 | Status: SHIPPED | OUTPATIENT
Start: 2021-06-30 | End: 2021-08-10

## 2021-06-30 NOTE — TELEPHONE ENCOUNTER
"Asking if PCP has any recommendations for a surgeon for hernia repair?     Also asking for new Rx for Lidocaine patches [facility] and Levalbuterol inhaler solution - pended     He will plan to follow up with PCP after surgeries are done unless PCP needs to see him sooner. Otherwise doing well now           Next 5 appointments (look out 90 days)    Aug 04, 2021  1:00 PM  Return Visit with Niles Cedillo MD  [facility] ([facility] ) [address] [place] [id number]  [phone]        ED / Discharge Outreach Protocol    Patient Contact    Attempt # 1    Was call answered?  Yes.  \"May I please speak with <Harrison>\"  Is patient available?   Yes    ED/Discharge Protocol    \"Hi, my name is Zaria Oh RN, a registered nurse, and I am calling on behalf of Dr. Dozier's office at [place].  I am calling to follow up and see how things are going for you after your recent visit.\"    \"I see that you were in the (ER/UC/IP) on 6/25/21.    How are you doing now that you are home?\" doing okay     Is patient experiencing symptoms that may require a hospital visit?  No     Discharge Instructions    \"Let's review your discharge instructions.  What is/are the follow-up recommendations?  Pt. Response: Friday surgery then go back for hernia surgery     \"Were you instructed to make a follow-up appointment?\"  Pt. Response: No.       \"When you see the provider, I would recommend that you bring your discharge instructions with you.    Medications    \"How many new medications are you on since your hospitalization/ED visit?\"    0-1  \"How many of your current medicines changed (dose, timing, name, etc.) while you were in the hospital/ED visit?\"   0-1  \"Do you have questions about your medications?\"   No  \"Were you newly diagnosed with heart failure, COPD, diabetes or did you have a heart attack?\"   No  For patients on insulin: \"Did you start on insulin in the hospital or did you have " "your insulin dose changed?\"   No  Post Discharge Medication Reconciliation Status: discharge medications reconciled, continue medications without change.    Was MTM referral placed (*Make sure to put transitions as reason for referral)?   No    Call Summary    \"Do you have any questions or concerns about your condition or care plan at the moment?\"    No  Triage nurse advice given    Patient was in ER 3x in the past year (assess appropriateness of ER visits.)      \"If you have questions or things don't continue to improve, we encourage you contact us through the main clinic number,  ([phone])  .  Even if the clinic is not open, triage nurses are available 24/7 to help you.     We would like you to know that our clinic has extended hours (provide information).  We also have urgent care (provide details on closest location and hours/contact info)\"      \"Thank you for your time and take care!\"    Zaria HAYS RN      "

## 2021-07-01 ENCOUNTER — ANESTHESIA EVENT (OUTPATIENT)
Dept: SURGERY | Facility: CLINIC | Age: 76
End: 2021-07-01
Payer: COMMERCIAL

## 2021-07-01 RX ORDER — GUAIFENESIN 600 MG/1
600 TABLET, EXTENDED RELEASE ORAL 2 TIMES DAILY PRN
COMMUNITY
End: 2024-01-18

## 2021-07-01 RX ORDER — IBUPROFEN 200 MG
200 TABLET ORAL DAILY PRN
COMMUNITY
End: 2021-08-10

## 2021-07-01 RX ORDER — OXYCODONE HYDROCHLORIDE 5 MG/1
TABLET ORAL
COMMUNITY
Start: 2021-04-22 | End: 2021-07-01

## 2021-07-01 RX ORDER — ROSUVASTATIN CALCIUM 10 MG/1
10 TABLET, COATED ORAL EVERY EVENING
COMMUNITY
End: 2022-03-01

## 2021-07-01 RX ORDER — ACETAMINOPHEN 500 MG
1000 TABLET ORAL
COMMUNITY
Start: 2021-04-17 | End: 2021-07-01

## 2021-07-01 RX ORDER — LIDOCAINE 4 G/G
1 PATCH TOPICAL DAILY PRN
COMMUNITY
End: 2021-11-15

## 2021-07-01 RX ORDER — MULTIPLE VITAMINS W/ MINERALS TAB 9MG-400MCG
1 TAB ORAL DAILY
COMMUNITY

## 2021-07-01 ASSESSMENT — LIFESTYLE VARIABLES: TOBACCO_USE: 1

## 2021-07-01 ASSESSMENT — COPD QUESTIONNAIRES: COPD: 1

## 2021-07-01 NOTE — TELEPHONE ENCOUNTER
Yes Dr Scott for hernia or Dr Blue    rx sent  Ok to schedule preops now so we don't have to find last minute appointments

## 2021-07-01 NOTE — PROGRESS NOTES
PTA medications updated by Medication Scribe prior to surgery via phone call with patient (last doses completed by Nurse)     Medication history sources: Patient, Surescripts and H&P  In the past week, patient estimated taking medication this percent of the time: Greater than 90%  Adherence assessment: N/A Not Observed    Significant changes made to the medication list:  Patient reports no longer taking the following meds (med scribe removed from PTA med list): Roflumilast, Incruse (unless he runs out of Trelegy)      Additional medication history information:   None    Medication reconciliation completed by provider prior to medication history? No    Time spent in this activity: 20 minutes    The information provided in this note is only as accurate as the sources available at the time of update(s)      Prior to Admission medications    Medication Sig Last Dose Taking? Auth Provider   albuterol (PROAIR HFA) 108 (90 Base) MCG/ACT inhaler INHALE 2 PUFFS INTO THE LUNGS EVERY 6 HOURS AS NEEDED FOR SHORTNESS OF BREATH / DYSPNEA OR WHEEZING  at PRN Yes Yaneli Dozier MD   alendronate (FOSAMAX) 70 MG tablet Take 1 tablet (70 mg) by mouth every 7 days ; take with 8oz glass of water on empty stomach, remain upright and do not eat for 45 minutes. 6/25/2021 Yes Niles Cedillo MD   amLODIPine (NORVASC) 5 MG tablet Take 1 tablet (5 mg) by mouth At Bedtime 7/1/2021 at PM Yes Yaneli Dozier MD   Ascorbic Acid (VITAMIN C PO) Take 1 tablet by mouth daily 7/1/2021 at AM Yes Reported, Patient   aspirin 81 MG tablet Take 1 tablet by mouth 2 times daily  Yes Reported, Patient   calcium carbonate (OS-KARLIE) 1500 (600 Ca) MG tablet Take 1 tablet by mouth daily  7/1/2021 at AM Yes Reported, Patient   Carisoprodol 250 MG TABS TAKE 1 TABLET BY MOUTH DAILY AS NEEDED FOR BACK ISSUE >1 WEEK at PRN Yes Yaneli Dozier MD   cholecalciferol 25 MCG (1000 UT) TABS Take 1,000 Units by mouth daily  7/1/2021 at AM Yes Reported, Patient   FISH OIL 1000  MG OR CAPS Take 1 g by mouth daily  7/1/2021 at AM Yes Reported, Patient   Fluticasone-Umeclidin-Vilanterol (TRELEGY ELLIPTA) 100-62.5-25 MCG/INH oral inhaler Inhale 1 puff into the lungs daily  at AM Yes Yaneli Dozier MD   folic acid (FOLVITE) 1 MG tablet Take 2 tablets (2 mg) by mouth daily 7/1/2021 at AM Yes Niles Cedillo MD   furosemide (LASIX) 20 MG tablet Take 1 tablet (20 mg) by mouth 2 times daily  Yes Yaneli Dozier MD   guaiFENesin (MUCINEX) 600 MG 12 hr tablet Take 1,200 mg by mouth 2 times daily as needed for congestion > 1 WEEK at PRN Yes Reported, Patient   HYDROcodone-acetaminophen (NORCO) 5-325 MG tablet Take 1 tablet by mouth 3 times daily as needed for moderate to severe pain  at PRN Yes Yaneli Dozier MD   ibuprofen (ADVIL/MOTRIN) 200 MG tablet Take 200 mg by mouth daily as needed for mild pain  at PRN Yes Reported, Patient   levalbuterol (XOPENEX) 0.31 MG/3ML neb solution INHALE 1 VIAL (3ML) BY NEBULIZATION EVERY 4 HOURS AS NEEDED FOR WHEEZING OR SHORTNESS OF BREATH.  at PRN Yes Yaneli Dozier MD   Lidocaine (LIDOCARE) 4 % Patch Place 1 patch onto the skin daily as needed for moderate pain To prevent lidocaine toxicity, patient should be patch free for 12 hrs daily.  at PRN Yes Reported, Patient   lisinopril (ZESTRIL) 40 MG tablet TAKE 1 TABLET BY MOUTH EVERY DAY  at AM Yes Yaneli Dozier MD   methimazole (TAPAZOLE) 5 MG tablet Take 2.5 mg by mouth Every Mon, Wed, Fri Morning (1/2 X 5MG)  at AM Yes Reported, Patient   methotrexate 2.5 MG tablet Take 5 tablets (12.5 mg) by mouth every 7 days . Labs required every 8-12 weeks. 6/25/2021 Yes Niles Cedillo MD   metoprolol succinate ER (TOPROL-XL) 50 MG 24 hr tablet Take 50 mg by mouth every evening  7/1/2021 at PM Yes Yaneli Dozier MD   multivitamin w/minerals (MULTIVITAMIN, THERAPEUTIC WITH MINERALS) tablet Take 1 tablet by mouth daily  7/1/2021 at AM Yes Reported, Patient   nitroGLYcerin (NITROSTAT) 0.4 MG sublingual tablet For chest pain  place 1 tablet under the tongue every 5 minutes for 3 doses. If symptoms persist 5 minutes after 1st dose call 911.  at PRN Yes Yaneli Dozier MD   rosuvastatin (CRESTOR) 10 MG tablet Take 10 mg by mouth every evening 7/1/2021 at PM Yes Reported, Patient   sildenafil (VIAGRA) 100 MG tablet Take 100 mg by mouth daily as needed   at PRN Yes Jerry Horvath MD

## 2021-07-02 ENCOUNTER — ANESTHESIA (OUTPATIENT)
Dept: SURGERY | Facility: CLINIC | Age: 76
End: 2021-07-02
Payer: COMMERCIAL

## 2021-07-02 ENCOUNTER — HOSPITAL ENCOUNTER (OUTPATIENT)
Facility: CLINIC | Age: 76
Discharge: HOME | End: 2021-07-02
Attending: THORACIC SURGERY (CARDIOTHORACIC VASCULAR SURGERY) | Admitting: THORACIC SURGERY (CARDIOTHORACIC VASCULAR SURGERY)
Payer: COMMERCIAL

## 2021-07-02 VITALS
DIASTOLIC BLOOD PRESSURE: 62 MMHG | HEIGHT: 70 IN | OXYGEN SATURATION: 95 % | WEIGHT: 168.7 LBS | RESPIRATION RATE: 22 BRPM | SYSTOLIC BLOOD PRESSURE: 132 MMHG | BODY MASS INDEX: 24.15 KG/M2 | HEART RATE: 56 BPM | TEMPERATURE: 97.3 F

## 2021-07-02 DIAGNOSIS — G89.18 ACUTE POSTOPERATIVE PAIN: Primary | ICD-10-CM

## 2021-07-02 DIAGNOSIS — M48.062 SPINAL STENOSIS OF LUMBAR REGION WITH NEUROGENIC CLAUDICATION: ICD-10-CM

## 2021-07-02 LAB
ABO + RH BLD: NORMAL
ABO + RH BLD: NORMAL
ANION GAP SERPL CALCULATED.3IONS-SCNC: 4 MMOL/L (ref 3–14)
BLD GP AB SCN SERPL QL: NORMAL
BLOOD BANK CMNT PATIENT-IMP: NORMAL
BUN SERPL-MCNC: 16 MG/DL (ref 7–30)
CALCIUM SERPL-MCNC: 8.9 MG/DL (ref 8.5–10.1)
CHLORIDE SERPL-SCNC: 109 MMOL/L (ref 94–109)
CO2 SERPL-SCNC: 29 MMOL/L (ref 20–32)
CREAT SERPL-MCNC: 0.89 MG/DL (ref 0.66–1.25)
ERYTHROCYTE [DISTWIDTH] IN BLOOD BY AUTOMATED COUNT: 13.9 % (ref 10–15)
GFR SERPL CREATININE-BSD FRML MDRD: 84 ML/MIN/{1.73_M2}
GLUCOSE SERPL-MCNC: 94 MG/DL (ref 70–99)
HCT VFR BLD AUTO: 41.2 % (ref 40–53)
HGB BLD-MCNC: 13.2 G/DL (ref 13.3–17.7)
INR PPP: 0.99 (ref 0.86–1.14)
MCH RBC QN AUTO: 29.3 PG (ref 26.5–33)
MCHC RBC AUTO-ENTMCNC: 32 G/DL (ref 31.5–36.5)
MCV RBC AUTO: 91 FL (ref 78–100)
PLATELET # BLD AUTO: 179 10E9/L (ref 150–450)
POTASSIUM SERPL-SCNC: 3.9 MMOL/L (ref 3.4–5.3)
RBC # BLD AUTO: 4.51 10E12/L (ref 4.4–5.9)
SODIUM SERPL-SCNC: 142 MMOL/L (ref 133–144)
SPECIMEN EXP DATE BLD: NORMAL
WBC # BLD AUTO: 6.3 10E9/L (ref 4–11)

## 2021-07-02 PROCEDURE — 710N000012 HC RECOVERY PHASE 2, PER MINUTE: Performed by: THORACIC SURGERY (CARDIOTHORACIC VASCULAR SURGERY)

## 2021-07-02 PROCEDURE — 88342 IMHCHEM/IMCYTCHM 1ST ANTB: CPT | Mod: TC | Performed by: THORACIC SURGERY (CARDIOTHORACIC VASCULAR SURGERY)

## 2021-07-02 PROCEDURE — 86900 BLOOD TYPING SEROLOGIC ABO: CPT | Performed by: THORACIC SURGERY (CARDIOTHORACIC VASCULAR SURGERY)

## 2021-07-02 PROCEDURE — 85610 PROTHROMBIN TIME: CPT | Performed by: THORACIC SURGERY (CARDIOTHORACIC VASCULAR SURGERY)

## 2021-07-02 PROCEDURE — 999N000141 HC STATISTIC PRE-PROCEDURE NURSING ASSESSMENT: Performed by: THORACIC SURGERY (CARDIOTHORACIC VASCULAR SURGERY)

## 2021-07-02 PROCEDURE — 88305 TISSUE EXAM BY PATHOLOGIST: CPT | Mod: 26 | Performed by: PATHOLOGY

## 2021-07-02 PROCEDURE — 86901 BLOOD TYPING SEROLOGIC RH(D): CPT | Performed by: THORACIC SURGERY (CARDIOTHORACIC VASCULAR SURGERY)

## 2021-07-02 PROCEDURE — 88341 IMHCHEM/IMCYTCHM EA ADD ANTB: CPT | Mod: TC | Performed by: THORACIC SURGERY (CARDIOTHORACIC VASCULAR SURGERY)

## 2021-07-02 PROCEDURE — 88341 IMHCHEM/IMCYTCHM EA ADD ANTB: CPT | Mod: 26 | Performed by: PATHOLOGY

## 2021-07-02 PROCEDURE — 88305 TISSUE EXAM BY PATHOLOGIST: CPT | Mod: TC | Performed by: THORACIC SURGERY (CARDIOTHORACIC VASCULAR SURGERY)

## 2021-07-02 PROCEDURE — 88331 PATH CONSLTJ SURG 1 BLK 1SPC: CPT | Mod: 26 | Performed by: PATHOLOGY

## 2021-07-02 PROCEDURE — 360N000076 HC SURGERY LEVEL 3, PER MIN: Performed by: THORACIC SURGERY (CARDIOTHORACIC VASCULAR SURGERY)

## 2021-07-02 PROCEDURE — 370N000017 HC ANESTHESIA TECHNICAL FEE, PER MIN: Performed by: THORACIC SURGERY (CARDIOTHORACIC VASCULAR SURGERY)

## 2021-07-02 PROCEDURE — 710N000009 HC RECOVERY PHASE 1, LEVEL 1, PER MIN: Performed by: THORACIC SURGERY (CARDIOTHORACIC VASCULAR SURGERY)

## 2021-07-02 PROCEDURE — 80048 BASIC METABOLIC PNL TOTAL CA: CPT | Performed by: THORACIC SURGERY (CARDIOTHORACIC VASCULAR SURGERY)

## 2021-07-02 PROCEDURE — 250N000009 HC RX 250: Performed by: NURSE ANESTHETIST, CERTIFIED REGISTERED

## 2021-07-02 PROCEDURE — 85027 COMPLETE CBC AUTOMATED: CPT | Performed by: THORACIC SURGERY (CARDIOTHORACIC VASCULAR SURGERY)

## 2021-07-02 PROCEDURE — 272N000001 HC OR GENERAL SUPPLY STERILE: Performed by: THORACIC SURGERY (CARDIOTHORACIC VASCULAR SURGERY)

## 2021-07-02 PROCEDURE — 250N000011 HC RX IP 250 OP 636: Performed by: NURSE ANESTHETIST, CERTIFIED REGISTERED

## 2021-07-02 PROCEDURE — 250N000025 HC SEVOFLURANE, PER MIN: Performed by: THORACIC SURGERY (CARDIOTHORACIC VASCULAR SURGERY)

## 2021-07-02 PROCEDURE — 250N000011 HC RX IP 250 OP 636: Performed by: THORACIC SURGERY (CARDIOTHORACIC VASCULAR SURGERY)

## 2021-07-02 PROCEDURE — 88342 IMHCHEM/IMCYTCHM 1ST ANTB: CPT | Mod: 26 | Performed by: PATHOLOGY

## 2021-07-02 PROCEDURE — 258N000003 HC RX IP 258 OP 636: Performed by: NURSE ANESTHETIST, CERTIFIED REGISTERED

## 2021-07-02 PROCEDURE — 88331 PATH CONSLTJ SURG 1 BLK 1SPC: CPT | Mod: TC | Performed by: THORACIC SURGERY (CARDIOTHORACIC VASCULAR SURGERY)

## 2021-07-02 PROCEDURE — 86850 RBC ANTIBODY SCREEN: CPT | Performed by: THORACIC SURGERY (CARDIOTHORACIC VASCULAR SURGERY)

## 2021-07-02 PROCEDURE — 36415 COLL VENOUS BLD VENIPUNCTURE: CPT | Performed by: THORACIC SURGERY (CARDIOTHORACIC VASCULAR SURGERY)

## 2021-07-02 RX ORDER — PROPOFOL 10 MG/ML
INJECTION, EMULSION INTRAVENOUS PRN
Status: DISCONTINUED | OUTPATIENT
Start: 2021-07-02 | End: 2021-07-02

## 2021-07-02 RX ORDER — MEPERIDINE HYDROCHLORIDE 25 MG/ML
12.5 INJECTION INTRAMUSCULAR; INTRAVENOUS; SUBCUTANEOUS
Status: DISCONTINUED | OUTPATIENT
Start: 2021-07-02 | End: 2021-07-02 | Stop reason: HOSPADM

## 2021-07-02 RX ORDER — DEXAMETHASONE SODIUM PHOSPHATE 4 MG/ML
INJECTION, SOLUTION INTRA-ARTICULAR; INTRALESIONAL; INTRAMUSCULAR; INTRAVENOUS; SOFT TISSUE PRN
Status: DISCONTINUED | OUTPATIENT
Start: 2021-07-02 | End: 2021-07-02

## 2021-07-02 RX ORDER — CEFAZOLIN SODIUM 2 G/100ML
2 INJECTION, SOLUTION INTRAVENOUS
Status: COMPLETED | OUTPATIENT
Start: 2021-07-02 | End: 2021-07-02

## 2021-07-02 RX ORDER — OXYCODONE HYDROCHLORIDE 5 MG/1
5 TABLET ORAL
Status: DISCONTINUED | OUTPATIENT
Start: 2021-07-02 | End: 2021-07-02 | Stop reason: HOSPADM

## 2021-07-02 RX ORDER — SODIUM CHLORIDE, SODIUM LACTATE, POTASSIUM CHLORIDE, CALCIUM CHLORIDE 600; 310; 30; 20 MG/100ML; MG/100ML; MG/100ML; MG/100ML
INJECTION, SOLUTION INTRAVENOUS CONTINUOUS
Status: DISCONTINUED | OUTPATIENT
Start: 2021-07-02 | End: 2021-07-02 | Stop reason: HOSPADM

## 2021-07-02 RX ORDER — NALOXONE HYDROCHLORIDE 0.4 MG/ML
0.4 INJECTION, SOLUTION INTRAMUSCULAR; INTRAVENOUS; SUBCUTANEOUS
Status: DISCONTINUED | OUTPATIENT
Start: 2021-07-02 | End: 2021-07-02 | Stop reason: HOSPADM

## 2021-07-02 RX ORDER — SODIUM CHLORIDE, SODIUM LACTATE, POTASSIUM CHLORIDE, CALCIUM CHLORIDE 600; 310; 30; 20 MG/100ML; MG/100ML; MG/100ML; MG/100ML
INJECTION, SOLUTION INTRAVENOUS CONTINUOUS PRN
Status: DISCONTINUED | OUTPATIENT
Start: 2021-07-02 | End: 2021-07-02

## 2021-07-02 RX ORDER — ACETAMINOPHEN 325 MG/1
650 TABLET ORAL
Status: DISCONTINUED | OUTPATIENT
Start: 2021-07-02 | End: 2021-07-02 | Stop reason: HOSPADM

## 2021-07-02 RX ORDER — LIDOCAINE HYDROCHLORIDE 20 MG/ML
INJECTION, SOLUTION INFILTRATION; PERINEURAL PRN
Status: DISCONTINUED | OUTPATIENT
Start: 2021-07-02 | End: 2021-07-02

## 2021-07-02 RX ORDER — NALOXONE HYDROCHLORIDE 0.4 MG/ML
0.2 INJECTION, SOLUTION INTRAMUSCULAR; INTRAVENOUS; SUBCUTANEOUS
Status: DISCONTINUED | OUTPATIENT
Start: 2021-07-02 | End: 2021-07-02 | Stop reason: HOSPADM

## 2021-07-02 RX ORDER — ONDANSETRON 4 MG/1
4 TABLET, ORALLY DISINTEGRATING ORAL EVERY 30 MIN PRN
Status: DISCONTINUED | OUTPATIENT
Start: 2021-07-02 | End: 2021-07-02 | Stop reason: HOSPADM

## 2021-07-02 RX ORDER — FENTANYL CITRATE 50 UG/ML
INJECTION, SOLUTION INTRAMUSCULAR; INTRAVENOUS PRN
Status: DISCONTINUED | OUTPATIENT
Start: 2021-07-02 | End: 2021-07-02

## 2021-07-02 RX ORDER — ONDANSETRON 2 MG/ML
4 INJECTION INTRAMUSCULAR; INTRAVENOUS EVERY 30 MIN PRN
Status: DISCONTINUED | OUTPATIENT
Start: 2021-07-02 | End: 2021-07-02 | Stop reason: HOSPADM

## 2021-07-02 RX ORDER — FENTANYL CITRATE 50 UG/ML
25-50 INJECTION, SOLUTION INTRAMUSCULAR; INTRAVENOUS
Status: DISCONTINUED | OUTPATIENT
Start: 2021-07-02 | End: 2021-07-02 | Stop reason: HOSPADM

## 2021-07-02 RX ORDER — EPHEDRINE SULFATE 50 MG/ML
INJECTION, SOLUTION INTRAMUSCULAR; INTRAVENOUS; SUBCUTANEOUS PRN
Status: DISCONTINUED | OUTPATIENT
Start: 2021-07-02 | End: 2021-07-02

## 2021-07-02 RX ORDER — HYDROMORPHONE HYDROCHLORIDE 1 MG/ML
.3-.5 INJECTION, SOLUTION INTRAMUSCULAR; INTRAVENOUS; SUBCUTANEOUS EVERY 10 MIN PRN
Status: DISCONTINUED | OUTPATIENT
Start: 2021-07-02 | End: 2021-07-02 | Stop reason: HOSPADM

## 2021-07-02 RX ORDER — CEFAZOLIN SODIUM 2 G/100ML
2 INJECTION, SOLUTION INTRAVENOUS SEE ADMIN INSTRUCTIONS
Status: DISCONTINUED | OUTPATIENT
Start: 2021-07-02 | End: 2021-07-02 | Stop reason: HOSPADM

## 2021-07-02 RX ORDER — OXYCODONE HYDROCHLORIDE 5 MG/1
2.5-5 TABLET ORAL EVERY 6 HOURS PRN
Qty: 8 TABLET | Refills: 0 | Status: ON HOLD | OUTPATIENT
Start: 2021-07-02 | End: 2021-07-20

## 2021-07-02 RX ADMIN — Medication 5 MG: at 08:36

## 2021-07-02 RX ADMIN — LIDOCAINE HYDROCHLORIDE 70 MG: 20 INJECTION, SOLUTION INFILTRATION; PERINEURAL at 08:34

## 2021-07-02 RX ADMIN — ROCURONIUM BROMIDE 20 MG: 10 INJECTION INTRAVENOUS at 08:46

## 2021-07-02 RX ADMIN — ROCURONIUM BROMIDE 35 MG: 10 INJECTION INTRAVENOUS at 08:34

## 2021-07-02 RX ADMIN — Medication 5 MG: at 09:24

## 2021-07-02 RX ADMIN — MIDAZOLAM 2 MG: 1 INJECTION INTRAMUSCULAR; INTRAVENOUS at 08:28

## 2021-07-02 RX ADMIN — SUGAMMADEX 300 MG: 100 INJECTION, SOLUTION INTRAVENOUS at 09:52

## 2021-07-02 RX ADMIN — ROCURONIUM BROMIDE 10 MG: 10 INJECTION INTRAVENOUS at 09:24

## 2021-07-02 RX ADMIN — SODIUM CHLORIDE, POTASSIUM CHLORIDE, SODIUM LACTATE AND CALCIUM CHLORIDE: 600; 310; 30; 20 INJECTION, SOLUTION INTRAVENOUS at 08:28

## 2021-07-02 RX ADMIN — ROCURONIUM BROMIDE 15 MG: 10 INJECTION INTRAVENOUS at 08:38

## 2021-07-02 RX ADMIN — Medication 5 MG: at 08:45

## 2021-07-02 RX ADMIN — PROPOFOL 180 MG: 10 INJECTION, EMULSION INTRAVENOUS at 08:34

## 2021-07-02 RX ADMIN — CEFAZOLIN SODIUM 2 G: 2 INJECTION, SOLUTION INTRAVENOUS at 08:43

## 2021-07-02 RX ADMIN — LIDOCAINE HYDROCHLORIDE 20 MG: 20 INJECTION, SOLUTION INFILTRATION; PERINEURAL at 09:52

## 2021-07-02 RX ADMIN — DEXAMETHASONE SODIUM PHOSPHATE 4 MG: 4 INJECTION, SOLUTION INTRA-ARTICULAR; INTRALESIONAL; INTRAMUSCULAR; INTRAVENOUS; SOFT TISSUE at 09:04

## 2021-07-02 RX ADMIN — FENTANYL CITRATE 50 MCG: 50 INJECTION, SOLUTION INTRAMUSCULAR; INTRAVENOUS at 08:34

## 2021-07-02 ASSESSMENT — MIFFLIN-ST. JEOR: SCORE: 1516.47

## 2021-07-02 ASSESSMENT — COPD QUESTIONNAIRES: CAT_SEVERITY: SEVERE

## 2021-07-02 ASSESSMENT — ENCOUNTER SYMPTOMS: DYSRHYTHMIAS: 0

## 2021-07-02 NOTE — ANESTHESIA PROCEDURE NOTES
Airway       Patient location during procedure: OR       Procedure Start/Stop Times: 7/2/2021 8:37 AM  Staff -        Anesthesiologist:  Tino Castelan MD       CRNA: Bernie Laurent APRN CRNA       Other Anesthesia Staff: Nilesh Valerio       Performed By: SRNA  Consent for Airway        Urgency: elective  Indications and Patient Condition       Indications for airway management: janes-procedural       Induction type:intravenous       Mask difficulty assessment: 1 - vent by mask    Final Airway Details       Final airway type: endotracheal airway       Successful airway: ETT - single and Oral  Endotracheal Airway Details        ETT size (mm): 8.0       Cuffed: yes       Successful intubation technique: direct laryngoscopy       DL Blade Type: MAC 3       Grade View of Cords: 1       Adjucts: stylet       Position: Left       Measured from: lips       Secured at (cm): 22       Bite block used: None    Post intubation assessment        Placement verified by: capnometry, equal breath sounds and chest rise        Number of attempts at approach: 1       Number of other approaches attempted: 0       Secured with: pink tape       Ease of procedure: easy       Dentition: Intact, Unchanged and Lips/oral mucosa injury (small upper lip lac)    Medication(s) Administered   Medication Administration Time: 7/2/2021 8:37 AM

## 2021-07-02 NOTE — DISCHARGE INSTRUCTIONS
Same Day Surgery Discharge Instructions for  Sedation and General Anesthesia       It's not unusual to feel dizzy, light-headed or faint for up to 24 hours after surgery or while taking pain medication.  If you have these symptoms: sit for a few minutes before standing and have someone assist you when you get up to walk or use the bathroom.      You should rest and relax for the next 24 hours. We recommend you make arrangements to have an adult stay with you for at least 24 hours after your discharge.  Avoid hazardous and strenuous activity.      DO NOT DRIVE any vehicle or operate mechanical equipment for 24 hours following the end of your surgery.  Even though you may feel normal, your reactions may be affected by the medication you have received.      Do not drink alcoholic beverages for 24 hours following surgery.       Slowly progress to your regular diet as you feel able. It's not unusual to feel nauseated and/or vomit after receiving anesthesia.  If you develop these symptoms, drink clear liquids (apple juice, ginger ale, broth, 7-up, etc. ) until you feel better.  If your nausea and vomiting persists for 24 hours, please notify your surgeon.        All narcotic pain medications, along with inactivity and anesthesia, can cause constipation. Drinking plenty of liquids and increasing fiber intake will help.      For any questions of a medical nature, call your surgeon.      Do not make important decisions for 24 hours.      If you had general anesthesia, you may have a sore throat for a couple of days related to the breathing tube used during surgery.  You may use Cepacol lozenges to help with this discomfort.  If it worsens or if you develop a fever, contact your surgeon.       If you feel your pain is not well managed with the pain medications prescribed by your surgeon, please contact your surgeon's office to let them know so they can address your concerns.       CoVid 19 Information    We want to give you  information regarding Covid. Please consult your primary care provider with any questions you might have.     Patient who have symptoms (cough, fever, or shortness of breath), need to isolate for 7 days from when symptoms started OR 72 hours after fever resolves (without fever reducing medications) AND improvement of respiratory symptoms (whichever is longer).      Isolate yourself at home (in own room/own bathroom if possible)    Do Not allow any visitors    Do Not go to work or school    Do Not go to Hindu,  centers, shopping, or other public places.    Do Not shake hands.    Avoid close and intimate contact with others (hugging, kissing).    Follow CDC recommendations for household cleaning of frequently touched services.     After the initial 7 days, continue to isolate yourself from household members as much as possible. To continue decrease the risk of community spread and exposure, you and any members of your household should limit activities in public for 14 days after starting home isolation.     You can reference the following CDC link for helpful home isolation/care tips:  https://www.cdc.gov/coronavirus/2019-ncov/downloads/10Things.pdf    Protect Others:    Cover Your Mouth and Nose with a mask, disposable tissue or wash cloth to avoid spreading germs to others.    Wash your hands and face frequently with soap and water    Call Your Primary Doctor If: Breathing difficulty develops or you become worse.    For more information about COVID19 and options for caring for yourself at home, please visit the CDC website at https://www.cdc.gov/coronavirus/2019-ncov/about/steps-when-sick.html  For more options for care at [facility], please visit our website at [email or web address]      Discharge Instructions Following a Mediastinoscopy         You may resume normal activity as tolerated.    You may resume your pre-op diet.      Your incision was closed using a liquid  adhesive. Do not scratch, rub or pick at the film over your incision.  You may shower in 24 hours.  Do not soak in a tub for at least one week. Do not apply lotions or cream to your incision.       Call Your Surgeon if you have:      Temperature of 101 or greater    Hoarseness that continues for more than a week.    Increased swelling, drainage, redness or tenderness at the incision site.    Increased shortness of breath    Pain control issues    Severe nausea and vomiting     Any questions or concerns.

## 2021-07-02 NOTE — OP NOTE
Procedure Date: 07/02/2021    PREOPERATIVE DIAGNOSIS:  Right paratracheal adenopathies; right upper lobe lung nodule.    POSTOPERATIVE DIAGNOSIS:  Right paratracheal adenopathies, right upper lobe lung nodule.    PROCEDURE:  Mediastinoscopy with biopsy of right high paratracheal lymph node and low paratracheal lymph nodes.    SURGEON:  Westley Dumont MD    ASSISTANT:  Vidya Robison PA-C    ANESTHESIA:  General with endotracheal intubation.    INDICATIONS FOR PROCEDURE:  A 75-year-old gentleman who was found to have a nodule in the right upper lobe lung.  PET scan shows some increase activity in this nodule and there is also some right paratracheal lymph nodes which are hypermetabolic.  He underwent an EBUS biopsy of the lymph node which showed some atypical cells and definitive diagnosis could not be made.  Based on the finding mediastinoscopy is indicated for staging.    DESCRIPTION OF PROCEDURE:  The patient was brought to the operating room and placed in supine position.  He underwent general anesthesia with anal intubation and the patient's neck was slightly extended.  Neck and upper chest was prepared and draped in sterile fashion using ChloraPrep.  A transverse incision was made a centimeter above the sternal notch.  Dissection was carried down midline to the strap muscle.  The pretracheal space was entered and bluntly dissected with finger.  The video endoscope was introduced.  Below the innominate artery there are some smaller lymph nodes.  Multiple lymph nodes were biopsied.  Then, just lateral to the trachea at the level of the innominate artery, there was a firm lymph node.  This was dissected and removed completely.  This was submitted for frozen section.  A portion of the specimen was examined under frozen section and revealed a malignant process.  The final diagnosis deferred to permanent section.  This was discussed with the pathologist.  PD-L1 and next generation sequencing was requested.   Hemostasis was excellent.  Incision was closed in usual fashion.    ESTIMATED BLOOD LOSS:  Minimal.    Sponge count is correct.    Vidya Robison PA-C, was the first assistant during the procedure.  Her role as first assistant was essential and necessary to  accomplish the steps of the procedure as described above, providing handling of the scope, exposure and retraction.    Westley Dumont MD        D: 2021   T: 2021   MT: [id number]    Name:     ABDIRASHID EPSTEIN  MRN:      [id number]        Account:        [id number]   :      1945           Procedure Date: 2021     Document: [id number]

## 2021-07-02 NOTE — BRIEF OP NOTE
[facility]    Brief Operative Note    Pre-operative diagnosis: Mediastinal adenopathy [R59.0]  Post-operative diagnosis mediastinal lymphadenopathy    Procedure: Procedure(s):  MEDIASTINOSCOPY, BIOPSY OF RIGHT PARATRACHEAL LYMPH NODES  Surgeon: Surgeon(s) and Role:     * Westley Dumont MD - Primary     * Vidya Robison PA-C - Assisting  Anesthesia: General   Estimated blood loss: 5 cc  Drains: None  Specimens:   ID Type Source Tests Collected by Time Destination   A : HIGH RIGHT PARATRACHEAL LYMPH NODE 2R Tissue Other SURGICAL PATHOLOGY EXAM Westley Dumont MD 7/2/2021  9:22 AM    B :  RIGHT PARATRACHEAL LYMPH NODE 4R Tissue Other SURGICAL PATHOLOGY EXAM Westley Dumont MD 7/2/2021  9:24 AM      Findings:   Firm high right paratracheal 2R lymph node positive for malignancy on frozen section-- await  final path.  Complications: None.  Implants: * No implants in log *

## 2021-07-02 NOTE — ANESTHESIA CARE TRANSFER NOTE
Patient: Harrison Thomas    Procedure(s):  MEDIASTINOSCOPY, BIOPSY OF RIGHT PARATRACHEAL LYMPH NODES    Diagnosis: Mediastinal adenopathy [R59.0]  Diagnosis Additional Information: No value filed.    Anesthesia Type:   General     Note:    Oropharynx: oropharynx clear of all foreign objects and spontaneously breathing  Level of Consciousness: awake  Oxygen Supplementation: face mask  Level of Supplemental Oxygen (L/min / FiO2): 6  Independent Airway: airway patency satisfactory and stable  Dentition: dentition unchanged  Vital Signs Stable: post-procedure vital signs reviewed and stable  Report to RN Given: handoff report given  Patient transferred to: PACU  Comments: Pt to PACU with O2 via mask, airway patent, VSS. Report to RN.  Handoff Report: Identifed the Patient, Identified the Reponsible Provider, Reviewed the pertinent medical history, Discussed the surgical course, Reviewed Intra-OP anesthesia mangement and issues during anesthesia, Set expectations for post-procedure period and Allowed opportunity for questions and acknowledgement of understanding      Vitals: (Last set prior to Anesthesia Care Transfer)  CRNA VITALS  7/2/2021 0925 - 7/2/2021 1000      7/2/2021             Pulse:  67    SpO2:  98 %    Resp Rate (set):  10        Electronically Signed By: PREETI Nettles CRNA  July 2, 2021  10:00 AM

## 2021-07-02 NOTE — ANESTHESIA POSTPROCEDURE EVALUATION
Patient: Harrison Thomas    Procedure(s):  MEDIASTINOSCOPY, BIOPSY OF RIGHT PARATRACHEAL LYMPH NODES    Diagnosis:Mediastinal adenopathy [R59.0]  Diagnosis Additional Information: No value filed.    Anesthesia Type:  General    Note:     Postop Pain Control: Uneventful            Sign Out: Well controlled pain   PONV: No   Neuro/Psych: Uneventful            Sign Out: Acceptable/Baseline neuro status   Airway/Respiratory: Uneventful            Sign Out: Acceptable/Baseline resp. status   CV/Hemodynamics: Uneventful            Sign Out: Acceptable CV status; No obvious hypovolemia; No obvious fluid overload   Other NRE: NONE   DID A NON-ROUTINE EVENT OCCUR? No           Last vitals:  Vitals:    07/02/21 1000 07/02/21 1015 07/02/21 1030   BP: (!) 145/66 138/61 132/62   Pulse: 58 57 56   Resp: 16 17 22   Temp: 35.9  C (96.6  F)  36.3  C (97.3  F)   SpO2: 100% 95% 95%       Last vitals prior to Anesthesia Care Transfer:  CRNA VITALS  7/2/2021 0925 - 7/2/2021 1025      7/2/2021             NIBP:  145/66    Pulse:  56    SpO2:  97 %    Resp Rate (observed):  14    EKG:  Sinus bradycardia          Electronically Signed By: Tino Castelan MD  July 2, 2021  3:51 PM
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
## 2021-07-05 DIAGNOSIS — I10 BENIGN ESSENTIAL HYPERTENSION: ICD-10-CM

## 2021-07-05 DIAGNOSIS — G89.18 ACUTE POSTOPERATIVE PAIN: ICD-10-CM

## 2021-07-05 NOTE — TELEPHONE ENCOUNTER
Controlled Substance Refill Request for oxycodone  Problem List Complete:    Yes    Last Written Prescription Date:  07/02/2021  Last Fill Quantity: 8,   # refills: 0    THE MOST RECENT OFFICE VISIT MUST BE WITHIN THE PAST 3 MONTHS. AT LEAST ONE FACE TO FACE VISIT MUST OCCUR EVERY 6 MONTHS. ADDITIONAL VISITS CAN BE VIRTUAL.  (THIS STATEMENT SHOULD BE DELETED.)    Last Office Visit with [facility] primary care provider: 06/03/2021    Future Office visit:   Next 5 appointments (look out 90 days)    Jul 12, 2021  2:00 PM  CONSULT with Tray Scott MD  [facility] ([facility] [place]) [address] [facility]  [facility] [address]  [phone]   Aug 04, 2021  1:00 PM  Return Visit with Niles Cedillo MD  [facility] ([facility] ) [address] [address]  [phone]          Controlled substance agreement:   Encounter-Level CSA - 05/26/2017:    Controlled Substance Agreement - Scan on 6/6/2017  3:48 PM: CONTROLLED SUBSTANCE AGREEMENT     Encounter-Level CSA - 11/14/2016:    Controlled Substance Agreement - Scan on 11/18/2016  7:45 AM: CONTROLLED SUBSTANCE AGREEMENT     Patient-Level CSA:    There are no patient-level csa.         Last Urine Drug Screen: No results found for: CDAUT, No results found for: COMDAT,   Cannabinoids (81-jok-2-carboxy-9-THC)   Date Value Ref Range Status   03/08/2021 Not Detected NDET^Not Detected ng/mL Final     Comment:     Cutoff for a negative cannabinoid is 50 ng/mL or less.     Phencyclidine (Phencyclidine)   Date Value Ref Range Status   03/08/2021 Not Detected NDET^Not Detected ng/mL Final     Comment:     Cutoff for a negative PCP is 25 ng/mL or less.     Cocaine (Benzoylecgonine)   Date Value Ref Range Status   03/08/2021 Not Detected NDET^Not Detected ng/mL Final     Comment:     Cutoff for a negative cocaine is 150 ng/ml or less.     Methamphetamine (d-Methamphetamine)    Date Value Ref Range Status   03/08/2021 Not Detected NDET^Not Detected ng/mL Final     Comment:     Cutoff for a negative methamphetamine is 500 ng/ml or less.     Opiates (Morphine)   Date Value Ref Range Status   03/08/2021 Detected, Abnormal Result (A) NDET^Not Detected ng/mL Final     Comment:     Cutoff for a positive opiate is greater than 100 ng/ml.  This is an unconfirmed screening result to be used for medical purposes only.   Order [id number] for confirmation or individual confirmation tests to [facility] [employer].       Amphetamine (d-Amphetamine)   Date Value Ref Range Status   03/08/2021 Not Detected NDET^Not Detected ng/mL Final     Comment:     Cutoff for a negative amphetamine is 500 ng/mL or less.     Benzodiazepines (Nordiazepam)   Date Value Ref Range Status   03/08/2021 Not Detected NDET^Not Detected ng/mL Final     Comment:     Cutoff for a negative benzodiazepine is 150 ng/ml or less.     Tricyclic Antidepressants (Desipramine)   Date Value Ref Range Status   03/08/2021 Not Detected NDET^Not Detected ng/mL Final     Comment:     Cutoff for a negative tricyclic antidepressant is 300 ng/ml or less.     Methadone (Methadone)   Date Value Ref Range Status   03/08/2021 Not Detected NDET^Not Detected ng/mL Final     Comment:     Cutoff for a negative methadone is 200 ng/ml or less.     Barbiturates (Butalbital)   Date Value Ref Range Status   03/08/2021 Not Detected NDET^Not Detected ng/mL Final     Comment:     Cutoff for a negative barbituate is 200 ng/ml or less.     Oxycodone (Oxycodone)   Date Value Ref Range Status   03/08/2021 Not Detected NDET^Not Detected ng/mL Final     Comment:     Cutoff for a negative Oxycodone is 100 ng/mL or less.     Propoxyphene (Norpropoxyphene)   Date Value Ref Range Status   03/08/2021 Not Detected NDET^Not Detected ng/mL Final     Comment:     Cutoff for a negative propoxyphene is 300 ng/ml or less     Buprenorphine (Buprenorphine)   Date Value Ref Range Status   03/08/2021  Not Detected NDET^Not Detected ng/mL Final     Comment:     Cutoff for a negative buprenorphine is 10 ng/ml or less        Processing:  Rx to be electronically transmitted to pharmacy by provider     [email or web address]   checked in past 3 months?  No, route to RN     Bita Hernandez MA

## 2021-07-05 NOTE — TELEPHONE ENCOUNTER
Medication Question or Refill    Who is calling: pt    What medication are you calling about (include dose and sig)?:   amLODIPine (NORVASC) 5 MG tablet    oxyCODONE (ROXICODONE) 5 MG tablet    Controlled Substance Agreement on file: No    Who prescribed the medication?: pcp    Do you need a refill? Yes: on both    When did you use the medication last? today    Patient offered an appointment? No    Do you have any questions or concerns?  No    Requested Pharmacy: in chart    Okay to leave a detailed message?: Yes at Cell number on file:    Telephone Information:   Mobile [phone]

## 2021-07-06 LAB — COPATH REPORT: NORMAL

## 2021-07-06 RX ORDER — AMLODIPINE BESYLATE 5 MG/1
5 TABLET ORAL AT BEDTIME
Qty: 180 TABLET | Refills: 3 | Status: SHIPPED | OUTPATIENT
Start: 2021-07-06 | End: 2022-05-02

## 2021-07-06 RX ORDER — OXYCODONE HYDROCHLORIDE 5 MG/1
2.5-5 TABLET ORAL EVERY 6 HOURS PRN
Qty: 8 TABLET | Refills: 0 | OUTPATIENT
Start: 2021-07-06

## 2021-07-06 NOTE — TELEPHONE ENCOUNTER
Routing refill request to provider for review/approval because:  Drug not on the G refill protocol     Pending Prescriptions:                       Disp   Refills    oxyCODONE (ROXICODONE) 5 MG tablet         8 tabl*0        Sig: Take 0.5-1 tablets (2.5-5 mg) by mouth every 6 hours           as needed for moderate to severe pain    Signed Prescriptions:                        Disp   Refills    amLODIPine (NORVASC) 5 MG tablet           180 ta*3        Sig: Take 1 tablet (5 mg) by mouth At Bedtime  Authorizing Provider: ANISA DEVLIN  Ordering User: ALLIE ROBLES    Last Written Prescription Date:  7-2-2021  Last Fill Quantity: 8,  # refills: 0   Last office visit: 6/3/2021 with prescribing provider:  Vidya Robison   Future Office Visit:   Next 5 appointments (look out 90 days)    Jul 12, 2021  2:00 PM  CONSULT with Tray Scott MD  [facility] ([facility] [place]) [address], [address]  [facility] [address]  [phone]   Aug 04, 2021  1:00 PM  Return Visit with Niles Cedillo MD  [facility] ([facility] [place] ) [address]  [place] [address]  [phone]           Allie Robles RN  [facility]

## 2021-07-06 NOTE — TELEPHONE ENCOUNTER
Prescription approved per [facility] Refill Protocol.  Allie Robles RN  [facility]

## 2021-07-07 ENCOUNTER — DOCUMENTATION ONLY (OUTPATIENT)
Dept: ENDOCRINOLOGY | Facility: CLINIC | Age: 76
End: 2021-07-07

## 2021-07-07 NOTE — PROGRESS NOTES
I received a phone call from Dr. Westley Dumont regarding patient and a recent bronchoscopy biopsy showing metastatic papillary thyroid carcinoma.  He asked me to see the patient for a thyroid cancer evaluation and I agreed.      I will ask our office staff to find a work-in appointment option, likely a video visit.  I will then review the health history, surgical and path reports, and thyroid related issues at that time.    MARCIAL Simmons MD, MS  Endocrinology  [facility]    CC:  SHANEL MONTELONGO

## 2021-07-08 DIAGNOSIS — G89.29 CHRONIC BILATERAL LOW BACK PAIN WITHOUT SCIATICA: Primary | ICD-10-CM

## 2021-07-08 DIAGNOSIS — M54.50 CHRONIC BILATERAL LOW BACK PAIN WITHOUT SCIATICA: Primary | ICD-10-CM

## 2021-07-08 RX ORDER — HYDROCODONE BITARTRATE AND ACETAMINOPHEN 5; 325 MG/1; MG/1
1 TABLET ORAL
COMMUNITY
Start: 2021-04-20 | End: 2021-07-08

## 2021-07-08 NOTE — TELEPHONE ENCOUNTER
Reason for Call:  Medication or medication refill:    Do you use a [facility]?  Name of the pharmacy and phone number for the current request: [facility] #[id number] - [place], [place] - [address] [place] [address]      Name of the medication requested: Hydrocodone per pt   Other request:     Can we leave a detailed message on this number? YES    Phone number patient can be reached at: [phone] cell phone      Best Time: anytime     Call taken on 7/8/2021 at 9:55 AM by Bernard Hairston

## 2021-07-09 RX ORDER — HYDROCODONE BITARTRATE AND ACETAMINOPHEN 5; 325 MG/1; MG/1
1 TABLET ORAL EVERY 6 HOURS PRN
Qty: 120 TABLET | Refills: 0 | Status: ON HOLD | OUTPATIENT
Start: 2021-07-09 | End: 2021-07-20

## 2021-07-09 NOTE — TELEPHONE ENCOUNTER
Last OV 06/03/2021.    Routing refill request to provider for review/approval because:  Drug not on the G refill protocol   Medication is reported/historical  HYDROcodone-acetaminophen (NORCO) 5-325 MG tablet   4/20/2021  --   Sig - Route: Take 1 tablet by mouth - Oral

## 2021-07-12 ENCOUNTER — OFFICE VISIT (OUTPATIENT)
Dept: SURGERY | Facility: CLINIC | Age: 76
End: 2021-07-12
Payer: COMMERCIAL

## 2021-07-12 VITALS
BODY MASS INDEX: 25.05 KG/M2 | WEIGHT: 175 LBS | HEIGHT: 70 IN | HEART RATE: 59 BPM | OXYGEN SATURATION: 95 % | SYSTOLIC BLOOD PRESSURE: 132 MMHG | DIASTOLIC BLOOD PRESSURE: 40 MMHG

## 2021-07-12 DIAGNOSIS — K40.90 LEFT INGUINAL HERNIA: Primary | ICD-10-CM

## 2021-07-12 PROCEDURE — 99244 OFF/OP CNSLTJ NEW/EST MOD 40: CPT | Performed by: SURGERY

## 2021-07-12 ASSESSMENT — MIFFLIN-ST. JEOR: SCORE: 1545.04

## 2021-07-12 NOTE — PROGRESS NOTES
[facility]  Surgery Consultation    Primary care provider:  Yaneli Dozier [phone]  Consultation requested by: Westley Dumont MD    HPI: Patient is a 75-year-old gentleman referred by the above provider for consultation regarding left inguinal hernia.  He was recently seen in the emergency department whereby he had an incarcerated left inguinal hernia resulting in a sigmoid colon obstruction reduced.  He has noted increased discomfort and pain in the area since.  This is bothering him on a daily basis.  He has no nausea or vomiting.  He does report that he is able to reduce the hernia himself.    PMH:   has a past medical history of Allergic rhinitis, cause unspecified (7/8/2005), Arthritis (2019), Back ache, Bruit, CAD (coronary artery disease) (12/29/97), Cerebral infarction (H), COPD (chronic obstructive pulmonary disease) (H), Essential hypertension, benign (11/11/2003), History of blood transfusion (1964), HTN (hypertension), Hyperlipidemia, Immunodeficiency (H), Melanocytic nevi of lip, Mixed hyperlipidemia (11/11/2003), Monoclonal paraproteinemia, Myocardial infarction (H), On home O2, BRENNEN (obstructive sleep apnea) (8/27/2018), Other chronic pain, PONV (postoperative nausea and vomiting), Retina hole (2014, rt), Syncopal episode (6-09), Thyroid nodule, TIA (transient ischaemic attack) (6-09), and Uncomplicated asthma (2004).  PSH:    has a past surgical history that includes COLONOSCOPY THRU STOMA, DIAGNOSTIC (4/05); RESEC LIVER,PART LOBECTOMY; Heart Cath, Angioplasty (12/29/97); orthopedic surgery; Colonoscopy (N/A, 8/5/2015); Cardiac surgery (12-29-97); Eye surgery (2014); Laser Holmium Enucleation Prostate (N/A, 4/18/2019); Esophagoscopy, gastroscopy, duodenoscopy (EGD), combined (N/A, 7/30/2019); joint replacement, hip rt/lt; Bronchoscopy Rigid Or Flexible W/Transendoscopic Endobronchial Ultrasound Guided (N/A, 6/22/2021); and Mediastinoscopy (N/A, 7/2/2021).  Social History:    "reports that he quit smoking about 22 years ago. His smoking use included cigarettes. He started smoking about 25 years ago. He has a 45.00 pack-year smoking history. He has never used smokeless tobacco. He reports current alcohol use. He reports that he does not use drugs.  Family History:  family history includes Asthma in his father and mother; Blood Disease in his daughter; C.A.D. in his mother; Cancer in his daughter; Cerebrovascular Disease in his mother; Coronary Artery Disease in his mother; Depression in his mother; Diabetes in his mother; Hyperlipidemia in his mother; Hypertension in his mother; Osteoporosis in his mother; Other Cancer in his daughter and mother; Respiratory in his father; Thyroid Disease in his mother.  Medications/Allergies: Home medications and allergies reviewed.    ROS:  The 10 point Review of Systems is negative other than noted in the HPI.    Physical Exam:  /40 (BP Location: Left arm, Patient Position: Sitting, Cuff Size: Adult Regular)   Pulse 59   Ht 1.778 m (5' 10\")   Wt 79.4 kg (175 lb)   SpO2 95%   BMI 25.11 kg/m    GENERAL: Generally appears well.  Psych: Alert and Oriented.  Normal affect  Eyes: Sclera clear  Respiratory:  Lungs clear to ausculation bilaterally with good air excursion  Cardiovascular:  Regular Rate and Rhythm with no murmurs gallops or rubs, normal peripheral pulses  GI: Abdomen Non Distended Non-Tender  No hernias palpated..  Groin- I examined the patient in both the standing and supine positions. Right Groin- No hernia Palpated. Left Groin- Moderate sized inguinal hernia.  Hernia was easily reduciable. No scrotal or testicle abnormalities.  Lymphatic/Hematologic/Immune:  No femoral or cervical lymphadenopathy.  Integumentary:  No rashes  Neurological: grossly intact     All new lab and imaging data was reviewed.     Impression and Plan:  Patient is a 75 year old male with symptomatic left inguinal hernia    PLAN: Given his prior surgical history " recommend outpatient open repair with mesh.  This will be performed at his earliest convenience.  I discussed the pathophysiology of hernias and options for repair including laparoscopic VS open.  The risks associated with the procedure including, but not limited to, recurrence, nerve entrapment or injury, persistence of pain, injury to the bowel/bladder, infertility, hematoma, mesh migration, mesh infection, MI, and PE were discussed with the patient. He indicated understanding of the discussion, asked appropriate questions, and provided consent. Signs and symptoms of incarceration were discussed. If these develop in the interim, he promises to call or go straight to the ER. I have provided the patient with an information pamphlet.  Thank you very much for this consult.    Tray Scott M.D.  [facility]  [phone]    Please route or send letter to:  Primary Care Provider (PCP) and Referring Provider

## 2021-07-12 NOTE — LETTER
July 12, 2021          Yaneli Dozier MD  [address]  [place] [address]      RE:   Harrison Thomas 1945      Dear Colleague,    Thank you for referring your patient, Harrison Thomas, to [facility]. Please see a copy of my visit note below.    [facility]  Surgery Consultation     Primary care provider:  Yaneli Dozier [phone]  Consultation requested by: Westley Dumont MD     HPI: Patient is a 75-year-old gentleman referred by the above provider for consultation regarding left inguinal hernia.  He was recently seen in the emergency department whereby he had an incarcerated left inguinal hernia resulting in a sigmoid colon obstruction reduced.  He has noted increased discomfort and pain in the area since.  This is bothering him on a daily basis.  He has no nausea or vomiting.  He does report that he is able to reduce the hernia himself.     PMH:   has a past medical history of Allergic rhinitis, cause unspecified (7/8/2005), Arthritis (2019), Back ache, Bruit, CAD (coronary artery disease) (12/29/97), Cerebral infarction (H), COPD (chronic obstructive pulmonary disease) (H), Essential hypertension, benign (11/11/2003), History of blood transfusion (1964), HTN (hypertension), Hyperlipidemia, Immunodeficiency (H), Melanocytic nevi of lip, Mixed hyperlipidemia (11/11/2003), Monoclonal paraproteinemia, Myocardial infarction (H), On home O2, BRENNEN (obstructive sleep apnea) (8/27/2018), Other chronic pain, PONV (postoperative nausea and vomiting), Retina hole (2014, rt), Syncopal episode (6-09), Thyroid nodule, TIA (transient ischaemic attack) (6-09), and Uncomplicated asthma (2004).  PSH:    has a past surgical history that includes COLONOSCOPY THRU STOMA, DIAGNOSTIC (4/05); RESEC LIVER,PART LOBECTOMY; Heart Cath, Angioplasty (12/29/97); orthopedic surgery; Colonoscopy (N/A, 8/5/2015); Cardiac surgery (12-29-97); Eye surgery (2014); Laser Holmium  "Enucleation Prostate (N/A, 4/18/2019); Esophagoscopy, gastroscopy, duodenoscopy (EGD), combined (N/A, 7/30/2019); joint replacement, hip rt/lt; Bronchoscopy Rigid Or Flexible W/Transendoscopic Endobronchial Ultrasound Guided (N/A, 6/22/2021); and Mediastinoscopy (N/A, 7/2/2021).  Social History:   reports that he quit smoking about 22 years ago. His smoking use included cigarettes. He started smoking about 25 years ago. He has a 45.00 pack-year smoking history. He has never used smokeless tobacco. He reports current alcohol use. He reports that he does not use drugs.  Family History:  family history includes Asthma in his father and mother; Blood Disease in his daughter; C.A.D. in his mother; Cancer in his daughter; Cerebrovascular Disease in his mother; Coronary Artery Disease in his mother; Depression in his mother; Diabetes in his mother; Hyperlipidemia in his mother; Hypertension in his mother; Osteoporosis in his mother; Other Cancer in his daughter and mother; Respiratory in his father; Thyroid Disease in his mother.  Medications/Allergies: Home medications and allergies reviewed.     ROS:  The 10 point Review of Systems is negative other than noted in the HPI.     Physical Exam:  /40 (BP Location: Left arm, Patient Position: Sitting, Cuff Size: Adult Regular)   Pulse 59   Ht 1.778 m (5' 10\")   Wt 79.4 kg (175 lb)   SpO2 95%   BMI 25.11 kg/m    GENERAL: Generally appears well.  Psych: Alert and Oriented.  Normal affect  Eyes: Sclera clear  Respiratory:  Lungs clear to ausculation bilaterally with good air excursion  Cardiovascular:  Regular Rate and Rhythm with no murmurs gallops or rubs, normal peripheral pulses  GI: Abdomen Non Distended Non-Tender  No hernias palpated..  Groin- I examined the patient in both the standing and supine positions. Right Groin- No hernia Palpated. Left Groin- Moderate sized inguinal hernia.  Hernia was easily reduciable. No scrotal or testicle " abnormalities.  Lymphatic/Hematologic/Immune:  No femoral or cervical lymphadenopathy.  Integumentary:  No rashes  Neurological: grossly intact      All new lab and imaging data was reviewed.      Impression and Plan:  Patient is a 75 year old male with symptomatic left inguinal hernia     PLAN: Given his prior surgical history recommend outpatient open repair with mesh.  This will be performed at his earliest convenience.  I discussed the pathophysiology of hernias and options for repair including laparoscopic VS open.  The risks associated with the procedure including, but not limited to, recurrence, nerve entrapment or injury, persistence of pain, injury to the bowel/bladder, infertility, hematoma, mesh migration, mesh infection, MI, and PE were discussed with the patient. He indicated understanding of the discussion, asked appropriate questions, and provided consent. Signs and symptoms of incarceration were discussed. If these develop in the interim, he promises to call or go straight to the ER. I have provided the patient with an information pamphlet.  Thank you very much for this consult.    Again, thank you for allowing me to participate in the care of your patient.      Sincerely,      Tray Scott MD

## 2021-07-13 DIAGNOSIS — Z11.59 ENCOUNTER FOR SCREENING FOR OTHER VIRAL DISEASES: ICD-10-CM

## 2021-07-13 PROBLEM — K40.90 LEFT INGUINAL HERNIA: Status: ACTIVE | Noted: 2021-07-13

## 2021-07-16 ENCOUNTER — VIRTUAL VISIT (OUTPATIENT)
Dept: ENDOCRINOLOGY | Facility: CLINIC | Age: 76
End: 2021-07-16
Payer: COMMERCIAL

## 2021-07-16 ENCOUNTER — LAB (OUTPATIENT)
Dept: LAB | Facility: CLINIC | Age: 76
End: 2021-07-16
Payer: COMMERCIAL

## 2021-07-16 ENCOUNTER — TELEPHONE (OUTPATIENT)
Dept: SURGERY | Facility: CLINIC | Age: 76
End: 2021-07-16

## 2021-07-16 DIAGNOSIS — C79.9 METASTASIS FROM THYROID CANCER (H): Primary | ICD-10-CM

## 2021-07-16 DIAGNOSIS — C73 METASTASIS FROM THYROID CANCER (H): Primary | ICD-10-CM

## 2021-07-16 DIAGNOSIS — Z11.59 ENCOUNTER FOR SCREENING FOR OTHER VIRAL DISEASES: ICD-10-CM

## 2021-07-16 DIAGNOSIS — E05.90 HYPERTHYROIDISM: ICD-10-CM

## 2021-07-16 DIAGNOSIS — E04.2 MULTIPLE THYROID NODULES: ICD-10-CM

## 2021-07-16 PROCEDURE — U0005 INFEC AGEN DETEC AMPLI PROBE: HCPCS

## 2021-07-16 PROCEDURE — U0003 INFECTIOUS AGENT DETECTION BY NUCLEIC ACID (DNA OR RNA); SEVERE ACUTE RESPIRATORY SYNDROME CORONAVIRUS 2 (SARS-COV-2) (CORONAVIRUS DISEASE [COVID-19]), AMPLIFIED PROBE TECHNIQUE, MAKING USE OF HIGH THROUGHPUT TECHNOLOGIES AS DESCRIBED BY CMS-2020-01-R: HCPCS

## 2021-07-16 PROCEDURE — 99205 OFFICE O/P NEW HI 60 MIN: CPT | Mod: 95 | Performed by: INTERNAL MEDICINE

## 2021-07-16 NOTE — TELEPHONE ENCOUNTER
Type of surgery: Open left inguinal hernia repair  Location of surgery: [facility]  Date and time of surgery: 7/20/21 at 9:30am  Surgeon: Dr. Tray Scott  Pre-Op Appt Date: Patient to schedule  Post-Op Appt Date: Patient to schedule   Packet sent out: Yes  Pre-cert/Authorization completed:  Not Applicable  Date: 7/16/21
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
## 2021-07-17 LAB — SARS-COV-2 RNA RESP QL NAA+PROBE: NEGATIVE

## 2021-07-20 ENCOUNTER — ANESTHESIA (OUTPATIENT)
Dept: SURGERY | Facility: CLINIC | Age: 76
End: 2021-07-20
Payer: COMMERCIAL

## 2021-07-20 ENCOUNTER — APPOINTMENT (OUTPATIENT)
Dept: SURGERY | Facility: PHYSICIAN GROUP | Age: 76
End: 2021-07-20
Payer: COMMERCIAL

## 2021-07-20 ENCOUNTER — HOSPITAL ENCOUNTER (OUTPATIENT)
Facility: CLINIC | Age: 76
Discharge: HOME | End: 2021-07-20
Attending: SURGERY | Admitting: SURGERY
Payer: COMMERCIAL

## 2021-07-20 ENCOUNTER — ANESTHESIA EVENT (OUTPATIENT)
Dept: SURGERY | Facility: CLINIC | Age: 76
End: 2021-07-20
Payer: COMMERCIAL

## 2021-07-20 VITALS
DIASTOLIC BLOOD PRESSURE: 67 MMHG | HEART RATE: 57 BPM | WEIGHT: 170.7 LBS | OXYGEN SATURATION: 95 % | TEMPERATURE: 96.3 F | HEIGHT: 70 IN | BODY MASS INDEX: 24.44 KG/M2 | SYSTOLIC BLOOD PRESSURE: 143 MMHG | RESPIRATION RATE: 16 BRPM

## 2021-07-20 DIAGNOSIS — K40.30 NON-RECURRENT UNILATERAL INGUINAL HERNIA WITH OBSTRUCTION WITHOUT GANGRENE: Primary | ICD-10-CM

## 2021-07-20 DIAGNOSIS — G89.18 ACUTE POST-OPERATIVE PAIN: ICD-10-CM

## 2021-07-20 DIAGNOSIS — K40.90 LEFT INGUINAL HERNIA: ICD-10-CM

## 2021-07-20 LAB — POTASSIUM BLD-SCNC: 4.2 MMOL/L (ref 3.4–5.3)

## 2021-07-20 PROCEDURE — 250N000013 HC RX MED GY IP 250 OP 250 PS 637: Performed by: PHYSICIAN ASSISTANT

## 2021-07-20 PROCEDURE — 250N000009 HC RX 250: Performed by: ANESTHESIOLOGY

## 2021-07-20 PROCEDURE — 360N000075 HC SURGERY LEVEL 2, PER MIN: Performed by: SURGERY

## 2021-07-20 PROCEDURE — 49505 PRP I/HERN INIT REDUC >5 YR: CPT | Mod: AS | Performed by: PHYSICIAN ASSISTANT

## 2021-07-20 PROCEDURE — 36415 COLL VENOUS BLD VENIPUNCTURE: CPT | Performed by: ANESTHESIOLOGY

## 2021-07-20 PROCEDURE — 250N000009 HC RX 250: Performed by: NURSE ANESTHETIST, CERTIFIED REGISTERED

## 2021-07-20 PROCEDURE — 258N000003 HC RX IP 258 OP 636: Performed by: NURSE ANESTHETIST, CERTIFIED REGISTERED

## 2021-07-20 PROCEDURE — 999N000141 HC STATISTIC PRE-PROCEDURE NURSING ASSESSMENT: Performed by: SURGERY

## 2021-07-20 PROCEDURE — 84132 ASSAY OF SERUM POTASSIUM: CPT | Performed by: ANESTHESIOLOGY

## 2021-07-20 PROCEDURE — 250N000011 HC RX IP 250 OP 636: Performed by: ANESTHESIOLOGY

## 2021-07-20 PROCEDURE — C1781 MESH (IMPLANTABLE): HCPCS | Performed by: SURGERY

## 2021-07-20 PROCEDURE — 49505 PRP I/HERN INIT REDUC >5 YR: CPT | Mod: LT | Performed by: SURGERY

## 2021-07-20 PROCEDURE — 250N000011 HC RX IP 250 OP 636: Performed by: NURSE ANESTHETIST, CERTIFIED REGISTERED

## 2021-07-20 PROCEDURE — 250N000009 HC RX 250: Performed by: SURGERY

## 2021-07-20 PROCEDURE — 370N000017 HC ANESTHESIA TECHNICAL FEE, PER MIN: Performed by: SURGERY

## 2021-07-20 PROCEDURE — 710N000009 HC RECOVERY PHASE 1, LEVEL 1, PER MIN: Performed by: SURGERY

## 2021-07-20 PROCEDURE — 272N000001 HC OR GENERAL SUPPLY STERILE: Performed by: SURGERY

## 2021-07-20 PROCEDURE — 710N000012 HC RECOVERY PHASE 2, PER MINUTE: Performed by: SURGERY

## 2021-07-20 PROCEDURE — 250N000025 HC SEVOFLURANE, PER MIN: Performed by: SURGERY

## 2021-07-20 PROCEDURE — 250N000011 HC RX IP 250 OP 636: Performed by: SURGERY

## 2021-07-20 DEVICE — MESH PROGRIP 4.7X3" PARIETEX LEFT TEM1208GL: Type: IMPLANTABLE DEVICE | Site: GROIN | Status: FUNCTIONAL

## 2021-07-20 RX ORDER — HYDROCODONE BITARTRATE AND ACETAMINOPHEN 5; 325 MG/1; MG/1
1 TABLET ORAL
Status: COMPLETED | OUTPATIENT
Start: 2021-07-20 | End: 2021-07-20

## 2021-07-20 RX ORDER — LIDOCAINE 40 MG/G
CREAM TOPICAL
Status: DISCONTINUED | OUTPATIENT
Start: 2021-07-20 | End: 2021-07-20 | Stop reason: HOSPADM

## 2021-07-20 RX ORDER — PROPOFOL 10 MG/ML
INJECTION, EMULSION INTRAVENOUS CONTINUOUS PRN
Status: DISCONTINUED | OUTPATIENT
Start: 2021-07-20 | End: 2021-07-20

## 2021-07-20 RX ORDER — ONDANSETRON 2 MG/ML
INJECTION INTRAMUSCULAR; INTRAVENOUS PRN
Status: DISCONTINUED | OUTPATIENT
Start: 2021-07-20 | End: 2021-07-20

## 2021-07-20 RX ORDER — HYDROMORPHONE HCL IN WATER/PF 6 MG/30 ML
0.4 PATIENT CONTROLLED ANALGESIA SYRINGE INTRAVENOUS EVERY 5 MIN PRN
Status: DISCONTINUED | OUTPATIENT
Start: 2021-07-20 | End: 2021-07-20 | Stop reason: HOSPADM

## 2021-07-20 RX ORDER — LIDOCAINE HYDROCHLORIDE 20 MG/ML
INJECTION, SOLUTION INFILTRATION; PERINEURAL PRN
Status: DISCONTINUED | OUTPATIENT
Start: 2021-07-20 | End: 2021-07-20

## 2021-07-20 RX ORDER — SODIUM CHLORIDE, SODIUM LACTATE, POTASSIUM CHLORIDE, CALCIUM CHLORIDE 600; 310; 30; 20 MG/100ML; MG/100ML; MG/100ML; MG/100ML
INJECTION, SOLUTION INTRAVENOUS CONTINUOUS
Status: DISCONTINUED | OUTPATIENT
Start: 2021-07-20 | End: 2021-07-20 | Stop reason: HOSPADM

## 2021-07-20 RX ORDER — FENTANYL CITRATE 0.05 MG/ML
50 INJECTION, SOLUTION INTRAMUSCULAR; INTRAVENOUS EVERY 5 MIN PRN
Status: DISCONTINUED | OUTPATIENT
Start: 2021-07-20 | End: 2021-07-20 | Stop reason: HOSPADM

## 2021-07-20 RX ORDER — ONDANSETRON 4 MG/1
4 TABLET, ORALLY DISINTEGRATING ORAL EVERY 30 MIN PRN
Status: DISCONTINUED | OUTPATIENT
Start: 2021-07-20 | End: 2021-07-20 | Stop reason: HOSPADM

## 2021-07-20 RX ORDER — FENTANYL CITRATE 50 UG/ML
INJECTION, SOLUTION INTRAMUSCULAR; INTRAVENOUS PRN
Status: DISCONTINUED | OUTPATIENT
Start: 2021-07-20 | End: 2021-07-20

## 2021-07-20 RX ORDER — SODIUM CHLORIDE, SODIUM LACTATE, POTASSIUM CHLORIDE, CALCIUM CHLORIDE 600; 310; 30; 20 MG/100ML; MG/100ML; MG/100ML; MG/100ML
INJECTION, SOLUTION INTRAVENOUS CONTINUOUS PRN
Status: DISCONTINUED | OUTPATIENT
Start: 2021-07-20 | End: 2021-07-20

## 2021-07-20 RX ORDER — BUPIVACAINE HYDROCHLORIDE AND EPINEPHRINE 5; 5 MG/ML; UG/ML
INJECTION, SOLUTION PERINEURAL PRN
Status: DISCONTINUED | OUTPATIENT
Start: 2021-07-20 | End: 2021-07-20 | Stop reason: HOSPADM

## 2021-07-20 RX ORDER — CEFAZOLIN SODIUM 2 G/100ML
2 INJECTION, SOLUTION INTRAVENOUS SEE ADMIN INSTRUCTIONS
Status: DISCONTINUED | OUTPATIENT
Start: 2021-07-20 | End: 2021-07-20 | Stop reason: HOSPADM

## 2021-07-20 RX ORDER — MEPERIDINE HYDROCHLORIDE 25 MG/ML
12.5 INJECTION INTRAMUSCULAR; INTRAVENOUS; SUBCUTANEOUS
Status: DISCONTINUED | OUTPATIENT
Start: 2021-07-20 | End: 2021-07-20 | Stop reason: HOSPADM

## 2021-07-20 RX ORDER — BUPIVACAINE HYDROCHLORIDE AND EPINEPHRINE 5; 5 MG/ML; UG/ML
INJECTION, SOLUTION EPIDURAL; INTRACAUDAL; PERINEURAL
Status: DISCONTINUED
Start: 2021-07-20 | End: 2021-07-20 | Stop reason: HOSPADM

## 2021-07-20 RX ORDER — CEFAZOLIN SODIUM 2 G/100ML
2 INJECTION, SOLUTION INTRAVENOUS
Status: DISCONTINUED | OUTPATIENT
Start: 2021-07-20 | End: 2021-07-20 | Stop reason: HOSPADM

## 2021-07-20 RX ORDER — AMOXICILLIN 250 MG
1-2 CAPSULE ORAL 2 TIMES DAILY
Qty: 15 TABLET | Refills: 0 | Status: SHIPPED | OUTPATIENT
Start: 2021-07-20 | End: 2021-08-12

## 2021-07-20 RX ORDER — HYDROCODONE BITARTRATE AND ACETAMINOPHEN 5; 325 MG/1; MG/1
1-2 TABLET ORAL EVERY 4 HOURS PRN
Qty: 15 TABLET | Refills: 0 | Status: SHIPPED | OUTPATIENT
Start: 2021-07-20 | End: 2021-08-10

## 2021-07-20 RX ORDER — EPHEDRINE SULFATE 50 MG/ML
INJECTION, SOLUTION INTRAMUSCULAR; INTRAVENOUS; SUBCUTANEOUS PRN
Status: DISCONTINUED | OUTPATIENT
Start: 2021-07-20 | End: 2021-07-20

## 2021-07-20 RX ORDER — ONDANSETRON 2 MG/ML
4 INJECTION INTRAMUSCULAR; INTRAVENOUS EVERY 30 MIN PRN
Status: DISCONTINUED | OUTPATIENT
Start: 2021-07-20 | End: 2021-07-20 | Stop reason: HOSPADM

## 2021-07-20 RX ORDER — DEXAMETHASONE SODIUM PHOSPHATE 4 MG/ML
INJECTION, SOLUTION INTRA-ARTICULAR; INTRALESIONAL; INTRAMUSCULAR; INTRAVENOUS; SOFT TISSUE PRN
Status: DISCONTINUED | OUTPATIENT
Start: 2021-07-20 | End: 2021-07-20

## 2021-07-20 RX ORDER — MAGNESIUM HYDROXIDE 1200 MG/15ML
LIQUID ORAL PRN
Status: DISCONTINUED | OUTPATIENT
Start: 2021-07-20 | End: 2021-07-20 | Stop reason: HOSPADM

## 2021-07-20 RX ORDER — PROPOFOL 10 MG/ML
INJECTION, EMULSION INTRAVENOUS PRN
Status: DISCONTINUED | OUTPATIENT
Start: 2021-07-20 | End: 2021-07-20

## 2021-07-20 RX ADMIN — FENTANYL CITRATE 50 MCG: 50 INJECTION, SOLUTION INTRAMUSCULAR; INTRAVENOUS at 08:41

## 2021-07-20 RX ADMIN — LIDOCAINE HYDROCHLORIDE 60 MG: 20 INJECTION, SOLUTION INFILTRATION; PERINEURAL at 08:20

## 2021-07-20 RX ADMIN — FENTANYL CITRATE 50 MCG: 50 INJECTION, SOLUTION INTRAMUSCULAR; INTRAVENOUS at 08:20

## 2021-07-20 RX ADMIN — DEXAMETHASONE SODIUM PHOSPHATE 4 MG: 4 INJECTION, SOLUTION INTRA-ARTICULAR; INTRALESIONAL; INTRAMUSCULAR; INTRAVENOUS; SOFT TISSUE at 08:25

## 2021-07-20 RX ADMIN — PROPOFOL 170 MG: 10 INJECTION, EMULSION INTRAVENOUS at 08:20

## 2021-07-20 RX ADMIN — ONDANSETRON 4 MG: 2 INJECTION INTRAMUSCULAR; INTRAVENOUS at 09:01

## 2021-07-20 RX ADMIN — HYDROCODONE BITARTRATE AND ACETAMINOPHEN 1 TABLET: 5; 325 TABLET ORAL at 09:47

## 2021-07-20 RX ADMIN — HYDROMORPHONE HYDROCHLORIDE 0.5 MG: 1 INJECTION, SOLUTION INTRAMUSCULAR; INTRAVENOUS; SUBCUTANEOUS at 08:44

## 2021-07-20 RX ADMIN — CEFAZOLIN SODIUM 2 G: 2 INJECTION, SOLUTION INTRAVENOUS at 08:25

## 2021-07-20 RX ADMIN — FENTANYL CITRATE 50 MCG: 50 INJECTION, SOLUTION INTRAMUSCULAR; INTRAVENOUS at 10:11

## 2021-07-20 RX ADMIN — SODIUM CHLORIDE, POTASSIUM CHLORIDE, SODIUM LACTATE AND CALCIUM CHLORIDE: 600; 310; 30; 20 INJECTION, SOLUTION INTRAVENOUS at 08:16

## 2021-07-20 RX ADMIN — Medication 5 MG: at 08:37

## 2021-07-20 RX ADMIN — PROPOFOL 30 MCG/KG/MIN: 10 INJECTION, EMULSION INTRAVENOUS at 08:23

## 2021-07-20 RX ADMIN — HYDROMORPHONE HYDROCHLORIDE 0.4 MG: 0.2 INJECTION, SOLUTION INTRAMUSCULAR; INTRAVENOUS; SUBCUTANEOUS at 10:26

## 2021-07-20 ASSESSMENT — COPD QUESTIONNAIRES
CAT_SEVERITY: SEVERE
COPD: 1

## 2021-07-20 ASSESSMENT — MIFFLIN-ST. JEOR: SCORE: 1525.54

## 2021-07-20 NOTE — OP NOTE
PREOPERATIVE DIAGNOSIS: Left  inguinal hernia.   POSTOPERATIVE DIAGNOSIS: Left inguinal hernia.   PROCEDURE: Left  inguinal hernia repair with mesh.   SURGEON: Tray Scott MD   ASSISTANT: Saskia Frye PA-C, Physician assistant first assistant was necessary during this procedure due to expertise in patient positioning, prepping, incisional opening, retraction, exposure, and suctioning.  ANESTHESIA: General   ESTIMATED BLOOD LOSS: 5 mL.   DRAINS: None.   COMPLICATIONS: None.   SPECIMENS: None.   OPERATIVE INDICATIONS: Mr. Thomas has a symptomatic Left  inguinal hernia. Options were discussed and it was elected to proceed with open repair. Potential risks of bleeding, infection, neurovascular injury to the vas deferens or testicle, recurrent hernia, chronic pain were all reviewed in detail and he wished to proceed.   DESCRIPTION OF PROCEDURE: After informed consent was obtained, the patient was taken to the operating room and placed supine on the operating table. Following the induction of adequate general anesthetic, the patient's Left  groin was shaved, prepped and draped in the usual fashion. A timeout was then completed and IV antibiotics were administered. A standard groin incision was then made and dissection was carried down to the external oblique fascia. This was sharply incised and the inguinal canal was exposed. The spermatic cord and its contents were mobilized and encircled with a Penrose drain. A moderate-sized indirect hernia was present. Hernia was dissected from the cord and reduced, the internal ring was sutured closed around the cord with 2.0 Vicryl suture. Anatomic side-specific Progrip Mesh was then introduced.  The flap to the mesh had been folded back.  The mesh was then placed in the inguinal canal.  The flap was then brought around the spermatic cord and secured into position.  The lateral tail of the mesh was placed up and underneath the fascia of the external oblique.  The spermatic  cord was then released and the fascia of the external oblique was closed using running 0 Vicryl stitch. The operative area was infiltrated with  local anesthetic. The deep subcutaneous was closed with 3-0 Vicryl stitch. Skin incision was closed with subcuticular 4-0 Monocryl stitch. Benzoin and Steri-Strips were applied. Needle and sponge counts were correct. The patient tolerated this well. He was awakened in the operating room and taken to recovery room in stable condition.     BRENDA SANTAMARIA MD

## 2021-07-20 NOTE — DISCHARGE INSTRUCTIONS
Same Day Surgery Discharge Instructions for  Sedation and General Anesthesia       It's not unusual to feel dizzy, light-headed or faint for up to 24 hours after surgery or while taking pain medication.  If you have these symptoms: sit for a few minutes before standing and have someone assist you when you get up to walk or use the bathroom.      You should rest and relax for the next 24 hours. We recommend you make arrangements to have an adult stay with you for at least 24 hours after your discharge.  Avoid hazardous and strenuous activity.      DO NOT DRIVE any vehicle or operate mechanical equipment for 24 hours following the end of your surgery.  Even though you may feel normal, your reactions may be affected by the medication you have received.      Do not drink alcoholic beverages for 24 hours following surgery.       Slowly progress to your regular diet as you feel able. It's not unusual to feel nauseated and/or vomit after receiving anesthesia.  If you develop these symptoms, drink clear liquids (apple juice, ginger ale, broth, 7-up, etc. ) until you feel better.  If your nausea and vomiting persists for 24 hours, please notify your surgeon.        All narcotic pain medications, along with inactivity and anesthesia, can cause constipation. Drinking plenty of liquids and increasing fiber intake will help.      For any questions of a medical nature, call your surgeon.      Do not make important decisions for 24 hours.      If you had general anesthesia, you may have a sore throat for a couple of days related to the breathing tube used during surgery.  You may use Cepacol lozenges to help with this discomfort.  If it worsens or if you develop a fever, contact your surgeon.       If you feel your pain is not well managed with the pain medications prescribed by your surgeon, please contact your surgeon's office to let them know so they can address your concerns.       CoVid 19 Information    We want to give you  information regarding Covid. Please consult your primary care provider with any questions you might have.     Patient who have symptoms (cough, fever, or shortness of breath), need to isolate for 7 days from when symptoms started OR 72 hours after fever resolves (without fever reducing medications) AND improvement of respiratory symptoms (whichever is longer).      Isolate yourself at home (in own room/own bathroom if possible)    Do Not allow any visitors    Do Not go to work or school    Do Not go to Sikhism,  centers, shopping, or other public places.    Do Not shake hands.    Avoid close and intimate contact with others (hugging, kissing).    Follow CDC recommendations for household cleaning of frequently touched services.     After the initial 7 days, continue to isolate yourself from household members as much as possible. To continue decrease the risk of community spread and exposure, you and any members of your household should limit activities in public for 14 days after starting home isolation.     You can reference the following CDC link for helpful home isolation/care tips:  https://www.cdc.gov/coronavirus/2019-ncov/downloads/10Things.pdf    Protect Others:    Cover Your Mouth and Nose with a mask, disposable tissue or wash cloth to avoid spreading germs to others.    Wash your hands and face frequently with soap and water    Call Your Primary Doctor If: Breathing difficulty develops or you become worse.    For more information about COVID19 and options for caring for yourself at home, please visit the CDC website at https://www.cdc.gov/coronavirus/2019-ncov/about/steps-when-sick.html  For more options for care at [facility], please visit our website at [email or web address]    **Because you had anesthesia today and your history of sleep apnea, it is extremely important that you use your CPAP machine for the next 24 hours while napping or sleeping.**          [facility]  Discharge Instructions: Post-Operative Open Inguinal Hernia Repair    ACTIVITY    Take frequent, short walks and increase your activity gradually.      Avoid strenuous physical activity or heavy lifting greater than 15 lbs. for 3-4 weeks.  You may climb stairs.    You may drive without restrictions when you are not using any prescription pain medication and feel comfortable in a car.    You may return to work/school when you are comfortable without any prescription pain medication.    WOUND CARE    You may remove your bandage and shower 48 hours after the surgery.  Pat your incisions dry and leave it open to air.  Re-apply dressing (Band-Aids or gauze/tape) as needed for comfort or drainage.    You may have steri-strips (looks like white tape) on your incisions.  You may peel off the steri-strips 2 weeks after your surgery if they have not peeled off on their own.     Do not soak your incisions in a tub or pool for 2 weeks.     Do not apply any lotions, creams, or ointments to your incisions.    A ridge under your incisions is normal and will gradually resolve.    DIET    Start with liquids, then gradually resume your regular diet as tolerated.     Drink plenty of fluids to stay hydrated.    PAIN    Expect some tenderness and discomfort at the incision site(s).  Use the prescribed pain medication at your discretion.  Expect gradual resolution of your pain over several days.    You may take ibuprofen with food (unless you have been told not to) instead of or in addition to your prescribed pain medication.  If you are taking Norco or Percocet, do not take any additional acetaminophen/Tylenol.    Do not drink alcohol or drive while you are taking pain medications.    You may apply ice to your incisions in 20 minute intervals as needed for the next 48 hours.  After that time, consider switching to heat if you prefer.    EXPECTATIONS    You can expect some swelling and bruising  involving the scrotum and/or penis as well as numbness.  These symptoms are expected and should gradually resolve in the next few days.  You may use ice to help with the swelling and try placing a rolled hand towel below your scrotum to help alleviate scrotal discomfort.  If you develop significant testicular or penile pain, please call our office and speak with a nurse.    Pain medications can cause constipation.  Limit use when possible.  Take over the counter stool softener/stimulant, such as Colace or Senna, 1-2 times a day with plenty of water.  You may take a mild over the counter laxative, such as Miralax or a suppository, as needed.  You may take 1 oz. (2 tablespoons) Milk of Magnesia the evening following surgery to encourage bowel movement.  You may discontinue these medications once you are having regular bowel movements and/or are no longer taking your narcotic pain medication.    If you are unable to urinate, or feel as though you are not emptying your bladder adequately, we recommend you call our office and/or seek care at an ER or Urgent Care facility if after hours.    FOLLOW UP    Our office will contact you in approximately 2-3 weeks to check on your progress and answer any questions you may have.  If you are doing well, you will not need to return for a follow up appointment.  If any concerns are identified over the phone, we will help you make an appointment to see a provider.   o If you have not received a phone call, have any questions or concerns, or would like to be seen, please call us at [phone] and ask to speak with our nurse.  We are located at [address] [place] [address].    CALL OUR OFFICE -[phone] IF YOU HAVE:     Chills or fever above 101 F.    Increased redness, warmth, or drainage at your incisions.    Significant bleeding.    Pain not relieved by your pain medication or rest.    Increasing pain after the first 48 hours.    Any other concerns or  questions.           Revised September 2020        **If you have questions or concerns about your procedure,  call Dr. Scott at [phone]**

## 2021-07-20 NOTE — BRIEF OP NOTE
[facility]  General Surgery Brief Operative Note    Pre-operative diagnosis: Left inguinal hernia [K40.90]   Post-operative diagnosis Same   Procedure: Procedure(s):  OPEN LEFT INGUINAL HERNIA REPAIR    Surgeon(s), Assistant(s): Surgeon(s) and Role:     * Tray Scott MD - Primary     * Saskia Frye PA-C - Assisting   Estimated blood loss: 5 mL    Drains: None   Specimens: * No specimens in log *   Findings: None.   Complications:  Implants:  Condition: None  None  Stable   Comments:      Saskia Frye PA-C  [facility]         See dictated operative report for full details
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
## 2021-07-20 NOTE — ANESTHESIA POSTPROCEDURE EVALUATION
Patient: Harrison Thomas    Procedure(s):  OPEN LEFT INGUINAL HERNIA REPAIR    Diagnosis:Left inguinal hernia [K40.90]  Diagnosis Additional Information: No value filed.    Anesthesia Type:  General    Note:  Disposition: Outpatient   Postop Pain Control: Uneventful            Sign Out: Well controlled pain   PONV: No   Neuro/Psych: Uneventful            Sign Out: Acceptable/Baseline neuro status   Airway/Respiratory: Uneventful            Sign Out: Acceptable/Baseline resp. status   CV/Hemodynamics: Uneventful            Sign Out: Acceptable CV status   Other NRE:    DID A NON-ROUTINE EVENT OCCUR? No           Last vitals:  Vitals Value Taken Time   /64 07/20/21 0945   Temp 36.4  C (97.6  F) 07/20/21 0923   Pulse 53 07/20/21 0945   Resp 14 07/20/21 0945   SpO2 92 % 07/20/21 0945   Vitals shown include unvalidated device data.    Electronically Signed By: Kala Martinez  July 20, 2021  2:54 PM

## 2021-07-20 NOTE — ANESTHESIA PROCEDURE NOTES
Airway       Patient location during procedure: OR  Staff -        CRNA: Carly Carrillo APRN CRNA       Performed By: CRNA  Consent for Airway        Urgency: elective  Indications and Patient Condition       Indications for airway management: janes-procedural         Mask difficulty assessment: 0 - not attempted    Final Airway Details       Final airway type: supraglottic airway    Supraglottic Airway Details        Type: LMA       Brand: LMA Unique       LMA size: 5    Post intubation assessment        Placement verified by: capnometry, equal breath sounds and chest rise        Number of attempts at approach: 1       Secured with: pink tape       Ease of procedure: easy       Dentition: Intact and Unchanged

## 2021-07-20 NOTE — ANESTHESIA CARE TRANSFER NOTE
Patient: Harrison Thomas    Procedure(s):  OPEN LEFT INGUINAL HERNIA REPAIR    Diagnosis: Left inguinal hernia [K40.90]  Diagnosis Additional Information: No value filed.    Anesthesia Type:   General     Note:    Oropharynx: oropharynx clear of all foreign objects and spontaneously breathing  Level of Consciousness: awake  Oxygen Supplementation: face mask  Level of Supplemental Oxygen (L/min / FiO2): 8  Independent Airway: airway patency satisfactory and stable  Dentition: dentition changed  Vital Signs Stable: post-procedure vital signs reviewed and stable  Report to RN Given: handoff report given  Patient transferred to: PACU    Handoff Report: Identifed the Patient, Identified the Reponsible Provider, Reviewed the pertinent medical history, Discussed the surgical course, Reviewed Intra-OP anesthesia mangement and issues during anesthesia, Set expectations for post-procedure period and Allowed opportunity for questions and acknowledgement of understanding      Vitals:  Vitals Value Taken Time   /102 07/20/21 0918   Temp 97.6f 07/20/21 0922   Pulse 65 07/20/21 0918   Resp 41 07/20/21 0920   SpO2 99 % 07/20/21 0920   Vitals shown include unvalidated device data.    Electronically Signed By: PREETI Stern CRNA  July 20, 2021  9:22 AM

## 2021-07-26 ENCOUNTER — TRANSFERRED RECORDS (OUTPATIENT)
Dept: HEALTH INFORMATION MANAGEMENT | Facility: CLINIC | Age: 76
End: 2021-07-26

## 2021-07-26 ENCOUNTER — HOSPITAL ENCOUNTER (OUTPATIENT)
Dept: BONE DENSITY | Facility: CLINIC | Age: 76
Discharge: HOME | End: 2021-07-26
Attending: INTERNAL MEDICINE | Admitting: INTERNAL MEDICINE
Payer: COMMERCIAL

## 2021-07-26 DIAGNOSIS — Z79.83 LONG TERM USE OF BISPHOSPHONATES: ICD-10-CM

## 2021-07-26 DIAGNOSIS — M85.80 OSTEOPENIA, UNSPECIFIED LOCATION: ICD-10-CM

## 2021-07-26 DIAGNOSIS — M05.79 RHEUMATOID ARTHRITIS INVOLVING MULTIPLE SITES WITH POSITIVE RHEUMATOID FACTOR (H): ICD-10-CM

## 2021-07-26 PROCEDURE — 77080 DXA BONE DENSITY AXIAL: CPT

## 2021-07-27 ENCOUNTER — VIRTUAL VISIT (OUTPATIENT)
Dept: SURGERY | Facility: CLINIC | Age: 76
End: 2021-07-27
Payer: COMMERCIAL

## 2021-07-27 VITALS — WEIGHT: 171 LBS | HEIGHT: 70 IN | BODY MASS INDEX: 24.48 KG/M2

## 2021-07-27 DIAGNOSIS — C79.9 METASTASIS FROM THYROID CANCER (H): Primary | ICD-10-CM

## 2021-07-27 DIAGNOSIS — C73 METASTASIS FROM THYROID CANCER (H): Primary | ICD-10-CM

## 2021-07-27 PROCEDURE — 99244 OFF/OP CNSLTJ NEW/EST MOD 40: CPT | Mod: 95 | Performed by: SURGERY

## 2021-07-27 ASSESSMENT — ENCOUNTER SYMPTOMS
ARTHRALGIAS: 1
DECREASED CONCENTRATION: 0
SPEECH CHANGE: 0
SWOLLEN GLANDS: 0
DECREASED APPETITE: 0
NERVOUS/ANXIOUS: 1
POLYPHAGIA: 0
PARALYSIS: 0
MUSCLE WEAKNESS: 1
MEMORY LOSS: 0
INCREASED ENERGY: 1
SINUS CONGESTION: 0
NECK PAIN: 1
PANIC: 0
ALTERED TEMPERATURE REGULATION: 0
NIGHT SWEATS: 0
TINGLING: 1
FATIGUE: 1
LOSS OF CONSCIOUSNESS: 0
VOMITING: 0
NUMBNESS: 1
WEAKNESS: 1
COUGH DISTURBING SLEEP: 0
ABDOMINAL PAIN: 1
JOINT SWELLING: 1
SNORES LOUDLY: 1
MUSCLE CRAMPS: 0
CHILLS: 0
NAUSEA: 0
SEIZURES: 0
RECTAL PAIN: 0
JAUNDICE: 0
HOARSE VOICE: 1
MYALGIAS: 1
BLOOD IN STOOL: 0
STIFFNESS: 1
HEARTBURN: 0
SPUTUM PRODUCTION: 0
HEMOPTYSIS: 0
FEVER: 0
HALLUCINATIONS: 0
BACK PAIN: 1
TREMORS: 0
NECK MASS: 0
BRUISES/BLEEDS EASILY: 1
SHORTNESS OF BREATH: 1
DISTURBANCES IN COORDINATION: 0
INSOMNIA: 1
CONSTIPATION: 1
POLYDIPSIA: 0
WEIGHT LOSS: 0
COUGH: 0
DYSPNEA ON EXERTION: 1
SINUS PAIN: 0
POSTURAL DYSPNEA: 0
BOWEL INCONTINENCE: 0
TASTE DISTURBANCE: 0
WEIGHT GAIN: 0
HEADACHES: 0
DIZZINESS: 1
BLOATING: 0
SORE THROAT: 1
DIARRHEA: 1
WHEEZING: 1
TROUBLE SWALLOWING: 1
DEPRESSION: 1

## 2021-07-27 ASSESSMENT — MIFFLIN-ST. JEOR: SCORE: 1526.9
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
## 2021-07-28 DIAGNOSIS — Z11.59 ENCOUNTER FOR SCREENING FOR OTHER VIRAL DISEASES: ICD-10-CM

## 2021-07-29 ENCOUNTER — LAB (OUTPATIENT)
Dept: URGENT CARE | Facility: URGENT CARE | Age: 76
End: 2021-07-29
Payer: COMMERCIAL

## 2021-07-29 DIAGNOSIS — Z11.59 ENCOUNTER FOR SCREENING FOR OTHER VIRAL DISEASES: ICD-10-CM

## 2021-07-29 DIAGNOSIS — M62.830 BACK MUSCLE SPASM: ICD-10-CM

## 2021-07-29 LAB — SARS-COV-2 RNA RESP QL NAA+PROBE: NEGATIVE

## 2021-07-29 PROCEDURE — U0005 INFEC AGEN DETEC AMPLI PROBE: HCPCS

## 2021-07-29 PROCEDURE — U0003 INFECTIOUS AGENT DETECTION BY NUCLEIC ACID (DNA OR RNA); SEVERE ACUTE RESPIRATORY SYNDROME CORONAVIRUS 2 (SARS-COV-2) (CORONAVIRUS DISEASE [COVID-19]), AMPLIFIED PROBE TECHNIQUE, MAKING USE OF HIGH THROUGHPUT TECHNOLOGIES AS DESCRIBED BY CMS-2020-01-R: HCPCS

## 2021-07-29 RX ORDER — CARISOPRODOL 250 MG/1
TABLET ORAL
Qty: 31 TABLET | Refills: 1 | Status: SHIPPED | OUTPATIENT
Start: 2021-07-29 | End: 2021-11-09

## 2021-07-29 NOTE — TELEPHONE ENCOUNTER
Routing refill request to provider for review/approval because:  Drug not on the [facility] refill protocol   Anastasiia Weiss RN  [facility] Triage Nurse

## 2021-07-30 ENCOUNTER — OFFICE VISIT (OUTPATIENT)
Dept: PULMONOLOGY | Facility: CLINIC | Age: 76
End: 2021-07-30
Attending: INTERNAL MEDICINE
Payer: COMMERCIAL

## 2021-07-30 ENCOUNTER — TELEPHONE (OUTPATIENT)
Dept: MEDSURG UNIT | Facility: CLINIC | Age: 76
End: 2021-07-30

## 2021-07-30 VITALS
HEIGHT: 70 IN | WEIGHT: 171 LBS | RESPIRATION RATE: 20 BRPM | SYSTOLIC BLOOD PRESSURE: 147 MMHG | OXYGEN SATURATION: 96 % | BODY MASS INDEX: 24.48 KG/M2 | DIASTOLIC BLOOD PRESSURE: 81 MMHG | HEART RATE: 52 BPM

## 2021-07-30 DIAGNOSIS — J43.8 OTHER EMPHYSEMA (H): ICD-10-CM

## 2021-07-30 DIAGNOSIS — J42 CHRONIC BRONCHITIS, UNSPECIFIED CHRONIC BRONCHITIS TYPE (H): Primary | ICD-10-CM

## 2021-07-30 PROCEDURE — G0463 HOSPITAL OUTPT CLINIC VISIT: HCPCS

## 2021-07-30 PROCEDURE — 99214 OFFICE O/P EST MOD 30 MIN: CPT | Performed by: INTERNAL MEDICINE

## 2021-07-30 ASSESSMENT — MIFFLIN-ST. JEOR: SCORE: 1526.9

## 2021-07-30 ASSESSMENT — PAIN SCALES - GENERAL: PAINLEVEL: NO PAIN (0)

## 2021-07-30 NOTE — PATIENT INSTRUCTIONS
Severe COPD and Emphysema  New issues of Thyroid Cancer and possible lung cancer  Plan for neck node biopsy Monday and thyroid surgery in ~ 3 weeks, followed by Thoracic Surgery by Dr. Dumont.  Pulmonary function tests done yesterday are not accessable by me today    Plan:  Continue Trelegy daily and rescue albuterol inhaler or nebulizer  Try taking an albuterol nebulizer treatment first thing in the morning - before Trelegy.  Then take Trelegy.  This may open up your airways and let the Trelegy have a better effect.    After surgeries are complete, it would be reasonable to get seen in Sleep Clinic to consider INSPIRE therapy for sleep apnea.  Please let us know and we can place a sleep clinic referral    Please call Brandon Early RN or colleagues for question or assistance [phone].    Return to clinic in 4 months

## 2021-07-30 NOTE — NURSING NOTE
Chief Complaint   Patient presents with     RECHECK     Lung nodule finding follow up     Arjun Neal CMA CMA at 8:18 AM on 7/30/2021
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
## 2021-07-30 NOTE — TELEPHONE ENCOUNTER
Pre-Procedure Negative COVID Test Results    Results Reviewed  The patient has a negative COVID test result within the required timeframe for the scheduled procedure.     No COVID pre-call needed.     JOSEFINA Barlow RN
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
## 2021-07-31 ENCOUNTER — MYC MEDICAL ADVICE (OUTPATIENT)
Dept: FAMILY MEDICINE | Facility: CLINIC | Age: 76
End: 2021-07-31

## 2021-08-02 ENCOUNTER — HOSPITAL ENCOUNTER (OUTPATIENT)
Dept: ULTRASOUND IMAGING | Facility: CLINIC | Age: 76
End: 2021-08-02
Attending: SURGERY
Payer: COMMERCIAL

## 2021-08-02 ENCOUNTER — HOSPITAL ENCOUNTER (OUTPATIENT)
Facility: CLINIC | Age: 76
Discharge: HOME | End: 2021-08-02
Admitting: RADIOLOGY
Payer: COMMERCIAL

## 2021-08-02 DIAGNOSIS — C73 METASTASIS FROM THYROID CANCER (H): ICD-10-CM

## 2021-08-02 DIAGNOSIS — C79.9 METASTASIS FROM THYROID CANCER (H): ICD-10-CM

## 2021-08-02 PROCEDURE — 76536 US EXAM OF HEAD AND NECK: CPT

## 2021-08-02 NOTE — TELEPHONE ENCOUNTER
PCP, please advise patient's mychart message.       Ravi Cruz, WILLIAM on 8/2/2021 at 11:42 AM

## 2021-08-04 ENCOUNTER — OFFICE VISIT (OUTPATIENT)
Dept: RHEUMATOLOGY | Facility: CLINIC | Age: 76
End: 2021-08-04
Payer: COMMERCIAL

## 2021-08-04 VITALS
HEART RATE: 54 BPM | BODY MASS INDEX: 24.48 KG/M2 | SYSTOLIC BLOOD PRESSURE: 137 MMHG | DIASTOLIC BLOOD PRESSURE: 67 MMHG | WEIGHT: 171 LBS | HEIGHT: 70 IN | OXYGEN SATURATION: 92 %

## 2021-08-04 DIAGNOSIS — Z11.59 ENCOUNTER FOR SCREENING FOR OTHER VIRAL DISEASES: ICD-10-CM

## 2021-08-04 DIAGNOSIS — M85.80 OSTEOPENIA, UNSPECIFIED LOCATION: ICD-10-CM

## 2021-08-04 DIAGNOSIS — M05.79 RHEUMATOID ARTHRITIS INVOLVING MULTIPLE SITES WITH POSITIVE RHEUMATOID FACTOR (H): Primary | ICD-10-CM

## 2021-08-04 DIAGNOSIS — Z79.899 HIGH RISK MEDICATIONS (NOT ANTICOAGULANTS) LONG-TERM USE: ICD-10-CM

## 2021-08-04 PROCEDURE — 99214 OFFICE O/P EST MOD 30 MIN: CPT | Performed by: INTERNAL MEDICINE

## 2021-08-04 RX ORDER — FOLIC ACID 1 MG/1
3 TABLET ORAL DAILY
Qty: 270 TABLET | Refills: 1 | Status: SHIPPED | OUTPATIENT
Start: 2021-08-04 | End: 2021-12-01

## 2021-08-04 ASSESSMENT — MIFFLIN-ST. JEOR: SCORE: 1526.9

## 2021-08-04 NOTE — NURSING NOTE
RAPID3 (0-30) Cumulative Score  15.5          RAPID3 Weighted Score (divide #4 by 3 and that is the weighted score)  5.1

## 2021-08-04 NOTE — PATIENT INSTRUCTIONS
RHEUMATOLOGY    Dr. Niles Cedillo    [facility]  [address] [place] [address]    Our new phone number for Rheumatology is [phone], this number will be able to help you schedule appointments for Dr. Cedillo or if you have any message you would like sent to us.    Thank you for choosing [facility]!    Kinjal Quinn [facility] Rheumatology
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
## 2021-08-05 ENCOUNTER — TELEPHONE (OUTPATIENT)
Dept: SURGERY | Facility: CLINIC | Age: 76
End: 2021-08-05
Payer: COMMERCIAL

## 2021-08-05 ENCOUNTER — TELEPHONE (OUTPATIENT)
Dept: FAMILY MEDICINE | Facility: CLINIC | Age: 76
End: 2021-08-05

## 2021-08-05 ENCOUNTER — PREP FOR PROCEDURE (OUTPATIENT)
Dept: SURGERY | Facility: CLINIC | Age: 76
End: 2021-08-05

## 2021-08-05 NOTE — TELEPHONE ENCOUNTER
Friday 8/13 doing surgery for thyroid cancer    Needs preop done prior     States PCP told him she can work him in - doesn't want to be seen by another provider     Asking if you can work him in for a preop for upcoming 8/13/21 surgery?     Call back: [phone] - detailed message is okay     Zaria HAYS RN

## 2021-08-07 DIAGNOSIS — I10 BENIGN ESSENTIAL HYPERTENSION: ICD-10-CM

## 2021-08-08 DIAGNOSIS — J44.1 COPD EXACERBATION (H): ICD-10-CM

## 2021-08-09 RX ORDER — METOPROLOL SUCCINATE 50 MG/1
TABLET, EXTENDED RELEASE ORAL
Qty: 90 TABLET | Refills: 2 | Status: SHIPPED | OUTPATIENT
Start: 2021-08-09 | End: 2021-08-12

## 2021-08-09 NOTE — H&P (VIEW-ONLY)
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                                                                      IMPRESSION:  1.  Indeterminate thyroid nodules.  2.  No adenopathy.  Signed Electronically by: Yaneli Dozier MD  Copy of this evaluation report is provided to requesting physician.

## 2021-08-09 NOTE — PROGRESS NOTES
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                                                                      IMPRESSION:  1.  Indeterminate thyroid nodules.  2.  No adenopathy.  Signed Electronically by: Yaneli Dozier MD  Copy of this evaluation report is provided to requesting physician.

## 2021-08-10 ENCOUNTER — OFFICE VISIT (OUTPATIENT)
Dept: FAMILY MEDICINE | Facility: CLINIC | Age: 76
End: 2021-08-10
Payer: COMMERCIAL

## 2021-08-10 ENCOUNTER — OFFICE VISIT (OUTPATIENT)
Dept: SURGERY | Facility: CLINIC | Age: 76
End: 2021-08-10
Payer: COMMERCIAL

## 2021-08-10 ENCOUNTER — TELEPHONE (OUTPATIENT)
Dept: SURGERY | Facility: CLINIC | Age: 76
End: 2021-08-10

## 2021-08-10 VITALS
SYSTOLIC BLOOD PRESSURE: 120 MMHG | OXYGEN SATURATION: 95 % | TEMPERATURE: 97.2 F | HEIGHT: 70 IN | DIASTOLIC BLOOD PRESSURE: 62 MMHG | HEART RATE: 55 BPM | WEIGHT: 173 LBS | BODY MASS INDEX: 24.77 KG/M2

## 2021-08-10 DIAGNOSIS — M54.50 CHRONIC BILATERAL LOW BACK PAIN WITHOUT SCIATICA: ICD-10-CM

## 2021-08-10 DIAGNOSIS — J44.9 CHRONIC OBSTRUCTIVE PULMONARY DISEASE, UNSPECIFIED COPD TYPE (H): ICD-10-CM

## 2021-08-10 DIAGNOSIS — C79.9 METASTASIS FROM THYROID CANCER (H): ICD-10-CM

## 2021-08-10 DIAGNOSIS — C73 METASTASIS FROM THYROID CANCER (H): ICD-10-CM

## 2021-08-10 DIAGNOSIS — C78.00 MALIGNANT NEOPLASM METASTATIC TO LUNG, UNSPECIFIED LATERALITY (H): ICD-10-CM

## 2021-08-10 DIAGNOSIS — R39.14 BENIGN PROSTATIC HYPERPLASIA WITH INCOMPLETE BLADDER EMPTYING: ICD-10-CM

## 2021-08-10 DIAGNOSIS — I10 BENIGN ESSENTIAL HYPERTENSION: ICD-10-CM

## 2021-08-10 DIAGNOSIS — G47.33 OSA (OBSTRUCTIVE SLEEP APNEA): ICD-10-CM

## 2021-08-10 DIAGNOSIS — M05.79 RHEUMATOID ARTHRITIS INVOLVING MULTIPLE SITES WITH POSITIVE RHEUMATOID FACTOR (H): ICD-10-CM

## 2021-08-10 DIAGNOSIS — Z01.818 PRE-OP EXAM: Primary | ICD-10-CM

## 2021-08-10 DIAGNOSIS — Z11.59 ENCOUNTER FOR SCREENING FOR OTHER VIRAL DISEASES: ICD-10-CM

## 2021-08-10 DIAGNOSIS — I25.10 CORONARY ARTERY DISEASE INVOLVING NATIVE CORONARY ARTERY OF NATIVE HEART WITHOUT ANGINA PECTORIS: ICD-10-CM

## 2021-08-10 DIAGNOSIS — N40.1 BENIGN PROSTATIC HYPERPLASIA WITH INCOMPLETE BLADDER EMPTYING: ICD-10-CM

## 2021-08-10 DIAGNOSIS — D84.9 IMMUNODEFICIENCY (H): ICD-10-CM

## 2021-08-10 DIAGNOSIS — G89.29 CHRONIC BILATERAL LOW BACK PAIN WITHOUT SCIATICA: ICD-10-CM

## 2021-08-10 DIAGNOSIS — G89.18 ACUTE POST-OPERATIVE PAIN: ICD-10-CM

## 2021-08-10 LAB — SARS-COV-2 RNA RESP QL NAA+PROBE: NEGATIVE

## 2021-08-10 PROCEDURE — 99207 PR NO CHARGE NURSE ONLY: CPT

## 2021-08-10 PROCEDURE — 99215 OFFICE O/P EST HI 40 MIN: CPT | Performed by: INTERNAL MEDICINE

## 2021-08-10 PROCEDURE — 87635 SARS-COV-2 COVID-19 AMP PRB: CPT

## 2021-08-10 PROCEDURE — 93000 ELECTROCARDIOGRAM COMPLETE: CPT | Performed by: INTERNAL MEDICINE

## 2021-08-10 RX ORDER — LEVALBUTEROL INHALATION SOLUTION 0.31 MG/3ML
SOLUTION RESPIRATORY (INHALATION)
Qty: 720 ML | Refills: 4 | Status: SHIPPED | OUTPATIENT
Start: 2021-08-10 | End: 2022-10-15

## 2021-08-10 RX ORDER — HYDROCODONE BITARTRATE AND ACETAMINOPHEN 5; 325 MG/1; MG/1
1-2 TABLET ORAL EVERY 4 HOURS PRN
Qty: 15 TABLET | Refills: 0 | Status: SHIPPED | OUTPATIENT
Start: 2021-08-10 | End: 2021-08-11

## 2021-08-10 ASSESSMENT — MIFFLIN-ST. JEOR: SCORE: 1535.97

## 2021-08-10 NOTE — TELEPHONE ENCOUNTER
Routing refill request to provider for review/approval because:  Drug not on the [facility] refill protocol     Feliciano Dawkins RN  [facility] Triage Nurse

## 2021-08-10 NOTE — PROGRESS NOTES
Patient seen in clinic for asymptomatic Pre-Surgery COVID test.    Patient swabbed via Nasopharyngeal Swab.  Specimen brought to hospital lab for  .    Patient educated to self-quarantine from now until surgery.    Jany Bernal CMA on 8/10/2021 at 10:37 AM

## 2021-08-10 NOTE — PATIENT INSTRUCTIONS
Take Metoprolol the AM of surgery   Take Trelegy the AM of surgery   Hold Methotrexate Friday   Hold aspirin for 1 week prior to surgery - holding since Sunday  Hold Vitamin C for 1 week  Hold Fosamax this week  Hold Fish oil this week  Don't take lasix this week  Take Lisinopril bed time prior to surgery

## 2021-08-10 NOTE — TELEPHONE ENCOUNTER
[facility]  Post op call note - No Answer    Harrison Thomas was called for an update regarding his recovery.  There was no answer and a message was left instructing the patient to call the office with any questions or concerns.     Saskia Frye PA-C

## 2021-08-11 ENCOUNTER — TELEPHONE (OUTPATIENT)
Dept: FAMILY MEDICINE | Facility: CLINIC | Age: 76
End: 2021-08-11

## 2021-08-11 DIAGNOSIS — G89.29 CHRONIC BILATERAL LOW BACK PAIN WITHOUT SCIATICA: Primary | ICD-10-CM

## 2021-08-11 DIAGNOSIS — G89.18 ACUTE POST-OPERATIVE PAIN: ICD-10-CM

## 2021-08-11 DIAGNOSIS — M48.062 SPINAL STENOSIS OF LUMBAR REGION WITH NEUROGENIC CLAUDICATION: ICD-10-CM

## 2021-08-11 DIAGNOSIS — M54.50 CHRONIC BILATERAL LOW BACK PAIN WITHOUT SCIATICA: Primary | ICD-10-CM

## 2021-08-11 RX ORDER — HYDROCODONE BITARTRATE AND ACETAMINOPHEN 5; 325 MG/1; MG/1
1-2 TABLET ORAL 2 TIMES DAILY PRN
Qty: 60 TABLET | Refills: 0 | Status: ON HOLD | OUTPATIENT
Start: 2021-08-11 | End: 2021-08-14

## 2021-08-11 NOTE — TELEPHONE ENCOUNTER
TO PCP:     Pt called, asking why he only received 15 tabs of Norco at his visit?     States he usually gets #120     Call back: [phone]. Detailed message is mukul HAYS RN    HYDROcodone-acetaminophen (NORCO) 5-325 MG tablet 15 tablet 0 8/10/2021  No   Sig - Route: Take 1-2 tablets by mouth every 4 hours as needed for moderate to severe pain - Oral   Sent to pharmacy as: HYDROcodone-Acetaminophen 5-325 MG Oral Tablet (NORCO)   Class: E-Prescribe   Earliest Fill Date: 8/10/2021   Order: [id number]   E-Prescribing Status: Receipt confirmed by pharmacy (8/10/2021 10:17 AM CDT)   Prior authorization: Closed - Prior Authorization not required for patient/medication

## 2021-08-12 ENCOUNTER — ANESTHESIA EVENT (OUTPATIENT)
Dept: SURGERY | Facility: CLINIC | Age: 76
End: 2021-08-12
Payer: COMMERCIAL

## 2021-08-12 RX ORDER — METOPROLOL SUCCINATE 50 MG/1
50 TABLET, EXTENDED RELEASE ORAL EVERY MORNING
COMMUNITY
End: 2022-08-15

## 2021-08-12 RX ORDER — ALBUTEROL SULFATE 90 UG/1
2 AEROSOL, METERED RESPIRATORY (INHALATION) 4 TIMES DAILY PRN
COMMUNITY
End: 2021-08-23

## 2021-08-12 ASSESSMENT — COPD QUESTIONNAIRES
CAT_SEVERITY: SEVERE
COPD: 1

## 2021-08-12 ASSESSMENT — LIFESTYLE VARIABLES: TOBACCO_USE: 0
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
## 2021-08-13 ENCOUNTER — ANESTHESIA (OUTPATIENT)
Dept: SURGERY | Facility: CLINIC | Age: 76
End: 2021-08-13
Payer: COMMERCIAL

## 2021-08-13 ENCOUNTER — HOSPITAL ENCOUNTER (INPATIENT)
Facility: CLINIC | Age: 76
LOS: 1 days | Discharge: HOME | End: 2021-08-14
Attending: SURGERY | Admitting: SURGERY
Payer: COMMERCIAL

## 2021-08-13 ENCOUNTER — APPOINTMENT (OUTPATIENT)
Dept: SURGERY | Facility: PHYSICIAN GROUP | Age: 76
End: 2021-08-13
Payer: COMMERCIAL

## 2021-08-13 DIAGNOSIS — C79.9 METASTASIS FROM THYROID CANCER (H): ICD-10-CM

## 2021-08-13 DIAGNOSIS — C73 METASTASIS FROM THYROID CANCER (H): ICD-10-CM

## 2021-08-13 LAB
CA-I BLD-MCNC: 4.5 MG/DL (ref 4.4–5.2)
POTASSIUM BLD-SCNC: 4.1 MMOL/L (ref 3.4–5.3)

## 2021-08-13 PROCEDURE — 60252 REMOVAL OF THYROID: CPT | Performed by: SURGERY

## 2021-08-13 PROCEDURE — 07T10ZZ RESECTION OF RIGHT NECK LYMPHATIC, OPEN APPROACH: ICD-10-PCS | Performed by: SURGERY

## 2021-08-13 PROCEDURE — 36415 COLL VENOUS BLD VENIPUNCTURE: CPT | Performed by: ANESTHESIOLOGY

## 2021-08-13 PROCEDURE — 710N000009 HC RECOVERY PHASE 1, LEVEL 1, PER MIN: Performed by: SURGERY

## 2021-08-13 PROCEDURE — 250N000011 HC RX IP 250 OP 636: Performed by: SURGERY

## 2021-08-13 PROCEDURE — 999N000141 HC STATISTIC PRE-PROCEDURE NURSING ASSESSMENT: Performed by: SURGERY

## 2021-08-13 PROCEDURE — 60252 REMOVAL OF THYROID: CPT | Mod: AS | Performed by: PHYSICIAN ASSISTANT

## 2021-08-13 PROCEDURE — 272N000001 HC OR GENERAL SUPPLY STERILE: Performed by: SURGERY

## 2021-08-13 PROCEDURE — 120N000001 HC R&B MED SURG/OB

## 2021-08-13 PROCEDURE — 250N000009 HC RX 250: Performed by: NURSE ANESTHETIST, CERTIFIED REGISTERED

## 2021-08-13 PROCEDURE — 88307 TISSUE EXAM BY PATHOLOGIST: CPT | Mod: TC | Performed by: SURGERY

## 2021-08-13 PROCEDURE — 250N000012 HC RX MED GY IP 250 OP 636 PS 637: Performed by: ANESTHESIOLOGY

## 2021-08-13 PROCEDURE — 258N000003 HC RX IP 258 OP 636: Performed by: ANESTHESIOLOGY

## 2021-08-13 PROCEDURE — 250N000011 HC RX IP 250 OP 636: Performed by: ANESTHESIOLOGY

## 2021-08-13 PROCEDURE — 250N000009 HC RX 250: Performed by: SURGERY

## 2021-08-13 PROCEDURE — 258N000003 HC RX IP 258 OP 636: Performed by: SURGERY

## 2021-08-13 PROCEDURE — 250N000011 HC RX IP 250 OP 636: Performed by: NURSE ANESTHETIST, CERTIFIED REGISTERED

## 2021-08-13 PROCEDURE — 0GTK0ZZ RESECTION OF THYROID GLAND, OPEN APPROACH: ICD-10-PCS | Performed by: SURGERY

## 2021-08-13 PROCEDURE — 370N000017 HC ANESTHESIA TECHNICAL FEE, PER MIN: Performed by: SURGERY

## 2021-08-13 PROCEDURE — 250N000013 HC RX MED GY IP 250 OP 250 PS 637: Performed by: NURSE ANESTHETIST, CERTIFIED REGISTERED

## 2021-08-13 PROCEDURE — 82330 ASSAY OF CALCIUM: CPT | Performed by: SURGERY

## 2021-08-13 PROCEDURE — 250N000025 HC SEVOFLURANE, PER MIN: Performed by: SURGERY

## 2021-08-13 PROCEDURE — 84132 ASSAY OF SERUM POTASSIUM: CPT | Performed by: ANESTHESIOLOGY

## 2021-08-13 PROCEDURE — 360N000076 HC SURGERY LEVEL 3, PER MIN: Performed by: SURGERY

## 2021-08-13 PROCEDURE — 250N000024 HC ISOFLURANE, PER MIN: Performed by: SURGERY

## 2021-08-13 PROCEDURE — 36415 COLL VENOUS BLD VENIPUNCTURE: CPT | Performed by: SURGERY

## 2021-08-13 PROCEDURE — 250N000013 HC RX MED GY IP 250 OP 250 PS 637: Performed by: SURGERY

## 2021-08-13 RX ORDER — FENTANYL CITRATE 0.05 MG/ML
50 INJECTION, SOLUTION INTRAMUSCULAR; INTRAVENOUS
Status: DISCONTINUED | OUTPATIENT
Start: 2021-08-13 | End: 2021-08-13 | Stop reason: HOSPADM

## 2021-08-13 RX ORDER — PROCHLORPERAZINE MALEATE 5 MG
5 TABLET ORAL EVERY 6 HOURS PRN
Status: DISCONTINUED | OUTPATIENT
Start: 2021-08-13 | End: 2021-08-14 | Stop reason: HOSPADM

## 2021-08-13 RX ORDER — BUPIVACAINE HYDROCHLORIDE AND EPINEPHRINE 2.5; 5 MG/ML; UG/ML
INJECTION, SOLUTION INFILTRATION; PERINEURAL PRN
Status: DISCONTINUED | OUTPATIENT
Start: 2021-08-13 | End: 2021-08-13 | Stop reason: HOSPADM

## 2021-08-13 RX ORDER — DIPHENHYDRAMINE HYDROCHLORIDE 50 MG/ML
12.5 INJECTION INTRAMUSCULAR; INTRAVENOUS EVERY 6 HOURS PRN
Status: DISCONTINUED | OUTPATIENT
Start: 2021-08-13 | End: 2021-08-13 | Stop reason: HOSPADM

## 2021-08-13 RX ORDER — FENTANYL CITRATE 50 UG/ML
INJECTION, SOLUTION INTRAMUSCULAR; INTRAVENOUS PRN
Status: DISCONTINUED | OUTPATIENT
Start: 2021-08-13 | End: 2021-08-13

## 2021-08-13 RX ORDER — AMLODIPINE BESYLATE 5 MG/1
5 TABLET ORAL EVERY MORNING
Status: DISCONTINUED | OUTPATIENT
Start: 2021-08-14 | End: 2021-08-14 | Stop reason: HOSPADM

## 2021-08-13 RX ORDER — ONDANSETRON 4 MG/1
4 TABLET, ORALLY DISINTEGRATING ORAL EVERY 30 MIN PRN
Status: DISCONTINUED | OUTPATIENT
Start: 2021-08-13 | End: 2021-08-13 | Stop reason: HOSPADM

## 2021-08-13 RX ORDER — OXYCODONE HYDROCHLORIDE 5 MG/1
5 TABLET ORAL EVERY 4 HOURS PRN
Status: DISCONTINUED | OUTPATIENT
Start: 2021-08-13 | End: 2021-08-13

## 2021-08-13 RX ORDER — EPHEDRINE SULFATE 50 MG/ML
INJECTION, SOLUTION INTRAMUSCULAR; INTRAVENOUS; SUBCUTANEOUS PRN
Status: DISCONTINUED | OUTPATIENT
Start: 2021-08-13 | End: 2021-08-13

## 2021-08-13 RX ORDER — MAGNESIUM HYDROXIDE 1200 MG/15ML
LIQUID ORAL PRN
Status: DISCONTINUED | OUTPATIENT
Start: 2021-08-13 | End: 2021-08-13 | Stop reason: HOSPADM

## 2021-08-13 RX ORDER — HYDROMORPHONE HCL IN WATER/PF 6 MG/30 ML
.2-.4 PATIENT CONTROLLED ANALGESIA SYRINGE INTRAVENOUS EVERY 5 MIN PRN
Status: DISCONTINUED | OUTPATIENT
Start: 2021-08-13 | End: 2021-08-13 | Stop reason: HOSPADM

## 2021-08-13 RX ORDER — ONDANSETRON 2 MG/ML
4 INJECTION INTRAMUSCULAR; INTRAVENOUS EVERY 6 HOURS PRN
Status: DISCONTINUED | OUTPATIENT
Start: 2021-08-13 | End: 2021-08-14 | Stop reason: HOSPADM

## 2021-08-13 RX ORDER — SODIUM CHLORIDE, SODIUM LACTATE, POTASSIUM CHLORIDE, CALCIUM CHLORIDE 600; 310; 30; 20 MG/100ML; MG/100ML; MG/100ML; MG/100ML
INJECTION, SOLUTION INTRAVENOUS CONTINUOUS
Status: DISCONTINUED | OUTPATIENT
Start: 2021-08-13 | End: 2021-08-13 | Stop reason: HOSPADM

## 2021-08-13 RX ORDER — DEXTROSE MONOHYDRATE, SODIUM CHLORIDE, AND POTASSIUM CHLORIDE 50; 1.49; 4.5 G/1000ML; G/1000ML; G/1000ML
INJECTION, SOLUTION INTRAVENOUS CONTINUOUS
Status: DISCONTINUED | OUTPATIENT
Start: 2021-08-13 | End: 2021-08-14 | Stop reason: HOSPADM

## 2021-08-13 RX ORDER — PROPOFOL 10 MG/ML
INJECTION, EMULSION INTRAVENOUS CONTINUOUS PRN
Status: DISCONTINUED | OUTPATIENT
Start: 2021-08-13 | End: 2021-08-13

## 2021-08-13 RX ORDER — HYDROMORPHONE HCL IN WATER/PF 6 MG/30 ML
0.4 PATIENT CONTROLLED ANALGESIA SYRINGE INTRAVENOUS
Status: DISCONTINUED | OUTPATIENT
Start: 2021-08-13 | End: 2021-08-14 | Stop reason: HOSPADM

## 2021-08-13 RX ORDER — POLYETHYLENE GLYCOL 3350 17 G/17G
17 POWDER, FOR SOLUTION ORAL DAILY
Status: DISCONTINUED | OUTPATIENT
Start: 2021-08-14 | End: 2021-08-14 | Stop reason: HOSPADM

## 2021-08-13 RX ORDER — OXYCODONE HYDROCHLORIDE 5 MG/1
5 TABLET ORAL EVERY 4 HOURS PRN
Status: DISCONTINUED | OUTPATIENT
Start: 2021-08-13 | End: 2021-08-14 | Stop reason: HOSPADM

## 2021-08-13 RX ORDER — ALBUTEROL SULFATE 90 UG/1
2 AEROSOL, METERED RESPIRATORY (INHALATION) 4 TIMES DAILY PRN
Status: DISCONTINUED | OUTPATIENT
Start: 2021-08-13 | End: 2021-08-14 | Stop reason: HOSPADM

## 2021-08-13 RX ORDER — LIDOCAINE HYDROCHLORIDE 20 MG/ML
INJECTION, SOLUTION INFILTRATION; PERINEURAL PRN
Status: DISCONTINUED | OUTPATIENT
Start: 2021-08-13 | End: 2021-08-13

## 2021-08-13 RX ORDER — AMOXICILLIN 250 MG
1 CAPSULE ORAL 2 TIMES DAILY
Status: DISCONTINUED | OUTPATIENT
Start: 2021-08-13 | End: 2021-08-14 | Stop reason: HOSPADM

## 2021-08-13 RX ORDER — KETAMINE HYDROCHLORIDE 10 MG/ML
INJECTION INTRAMUSCULAR; INTRAVENOUS PRN
Status: DISCONTINUED | OUTPATIENT
Start: 2021-08-13 | End: 2021-08-13

## 2021-08-13 RX ORDER — APREPITANT 40 MG/1
40 CAPSULE ORAL ONCE
Status: COMPLETED | OUTPATIENT
Start: 2021-08-13 | End: 2021-08-13

## 2021-08-13 RX ORDER — BISACODYL 10 MG
10 SUPPOSITORY, RECTAL RECTAL DAILY PRN
Status: DISCONTINUED | OUTPATIENT
Start: 2021-08-13 | End: 2021-08-14 | Stop reason: HOSPADM

## 2021-08-13 RX ORDER — FUROSEMIDE 20 MG
20 TABLET ORAL 2 TIMES DAILY
Status: DISCONTINUED | OUTPATIENT
Start: 2021-08-13 | End: 2021-08-14 | Stop reason: HOSPADM

## 2021-08-13 RX ORDER — FAMOTIDINE 20 MG/1
20 TABLET, FILM COATED ORAL 2 TIMES DAILY
Status: DISCONTINUED | OUTPATIENT
Start: 2021-08-13 | End: 2021-08-14 | Stop reason: HOSPADM

## 2021-08-13 RX ORDER — DIPHENHYDRAMINE HCL 12.5MG/5ML
12.5 LIQUID (ML) ORAL EVERY 6 HOURS PRN
Status: DISCONTINUED | OUTPATIENT
Start: 2021-08-13 | End: 2021-08-13 | Stop reason: HOSPADM

## 2021-08-13 RX ORDER — ALBUTEROL SULFATE 90 UG/1
AEROSOL, METERED RESPIRATORY (INHALATION) PRN
Status: DISCONTINUED | OUTPATIENT
Start: 2021-08-13 | End: 2021-08-13

## 2021-08-13 RX ORDER — LIOTHYRONINE SODIUM 25 UG/1
25 TABLET ORAL 2 TIMES DAILY
Status: DISCONTINUED | OUTPATIENT
Start: 2021-08-14 | End: 2021-08-14 | Stop reason: HOSPADM

## 2021-08-13 RX ORDER — ONDANSETRON 2 MG/ML
INJECTION INTRAMUSCULAR; INTRAVENOUS PRN
Status: DISCONTINUED | OUTPATIENT
Start: 2021-08-13 | End: 2021-08-13

## 2021-08-13 RX ORDER — ACETAMINOPHEN 325 MG/1
650 TABLET ORAL EVERY 4 HOURS PRN
Status: DISCONTINUED | OUTPATIENT
Start: 2021-08-16 | End: 2021-08-14 | Stop reason: HOSPADM

## 2021-08-13 RX ORDER — HYDROMORPHONE HCL IN WATER/PF 6 MG/30 ML
0.2 PATIENT CONTROLLED ANALGESIA SYRINGE INTRAVENOUS
Status: DISCONTINUED | OUTPATIENT
Start: 2021-08-13 | End: 2021-08-14 | Stop reason: HOSPADM

## 2021-08-13 RX ORDER — ALBUTEROL SULFATE 0.83 MG/ML
1.25 SOLUTION RESPIRATORY (INHALATION) EVERY 4 HOURS PRN
Status: DISCONTINUED | OUTPATIENT
Start: 2021-08-13 | End: 2021-08-14 | Stop reason: HOSPADM

## 2021-08-13 RX ORDER — ACETAMINOPHEN 325 MG/1
975 TABLET ORAL EVERY 8 HOURS
Status: DISCONTINUED | OUTPATIENT
Start: 2021-08-13 | End: 2021-08-14 | Stop reason: HOSPADM

## 2021-08-13 RX ORDER — DEXAMETHASONE SODIUM PHOSPHATE 4 MG/ML
INJECTION, SOLUTION INTRA-ARTICULAR; INTRALESIONAL; INTRAMUSCULAR; INTRAVENOUS; SOFT TISSUE PRN
Status: DISCONTINUED | OUTPATIENT
Start: 2021-08-13 | End: 2021-08-13

## 2021-08-13 RX ORDER — CEFAZOLIN SODIUM 2 G/100ML
2 INJECTION, SOLUTION INTRAVENOUS
Status: COMPLETED | OUTPATIENT
Start: 2021-08-13 | End: 2021-08-13

## 2021-08-13 RX ORDER — ONDANSETRON 4 MG/1
4 TABLET, ORALLY DISINTEGRATING ORAL EVERY 6 HOURS PRN
Status: DISCONTINUED | OUTPATIENT
Start: 2021-08-13 | End: 2021-08-14 | Stop reason: HOSPADM

## 2021-08-13 RX ORDER — ONDANSETRON 2 MG/ML
4 INJECTION INTRAMUSCULAR; INTRAVENOUS EVERY 30 MIN PRN
Status: DISCONTINUED | OUTPATIENT
Start: 2021-08-13 | End: 2021-08-13 | Stop reason: HOSPADM

## 2021-08-13 RX ORDER — LEVALBUTEROL INHALATION SOLUTION 0.31 MG/3ML
1 SOLUTION RESPIRATORY (INHALATION) EVERY 4 HOURS PRN
Status: DISCONTINUED | OUTPATIENT
Start: 2021-08-13 | End: 2021-08-13 | Stop reason: CLARIF

## 2021-08-13 RX ORDER — METOPROLOL SUCCINATE 50 MG/1
50 TABLET, EXTENDED RELEASE ORAL EVERY MORNING
Status: DISCONTINUED | OUTPATIENT
Start: 2021-08-14 | End: 2021-08-14 | Stop reason: HOSPADM

## 2021-08-13 RX ORDER — MEPERIDINE HYDROCHLORIDE 25 MG/ML
12.5 INJECTION INTRAMUSCULAR; INTRAVENOUS; SUBCUTANEOUS EVERY 5 MIN PRN
Status: DISCONTINUED | OUTPATIENT
Start: 2021-08-13 | End: 2021-08-13 | Stop reason: HOSPADM

## 2021-08-13 RX ORDER — LISINOPRIL 40 MG/1
40 TABLET ORAL DAILY
Status: DISCONTINUED | OUTPATIENT
Start: 2021-08-13 | End: 2021-08-14 | Stop reason: HOSPADM

## 2021-08-13 RX ORDER — PROPOFOL 10 MG/ML
INJECTION, EMULSION INTRAVENOUS PRN
Status: DISCONTINUED | OUTPATIENT
Start: 2021-08-13 | End: 2021-08-13

## 2021-08-13 RX ORDER — NALOXONE HYDROCHLORIDE 0.4 MG/ML
0.2 INJECTION, SOLUTION INTRAMUSCULAR; INTRAVENOUS; SUBCUTANEOUS
Status: DISCONTINUED | OUTPATIENT
Start: 2021-08-13 | End: 2021-08-14 | Stop reason: HOSPADM

## 2021-08-13 RX ORDER — FENTANYL CITRATE 0.05 MG/ML
25-50 INJECTION, SOLUTION INTRAMUSCULAR; INTRAVENOUS EVERY 5 MIN PRN
Status: DISCONTINUED | OUTPATIENT
Start: 2021-08-13 | End: 2021-08-13 | Stop reason: HOSPADM

## 2021-08-13 RX ORDER — NALOXONE HYDROCHLORIDE 0.4 MG/ML
0.4 INJECTION, SOLUTION INTRAMUSCULAR; INTRAVENOUS; SUBCUTANEOUS
Status: DISCONTINUED | OUTPATIENT
Start: 2021-08-13 | End: 2021-08-14 | Stop reason: HOSPADM

## 2021-08-13 RX ORDER — GLYCOPYRROLATE 0.2 MG/ML
INJECTION, SOLUTION INTRAMUSCULAR; INTRAVENOUS PRN
Status: DISCONTINUED | OUTPATIENT
Start: 2021-08-13 | End: 2021-08-13

## 2021-08-13 RX ORDER — ROSUVASTATIN CALCIUM 10 MG/1
10 TABLET, COATED ORAL EVERY EVENING
Status: DISCONTINUED | OUTPATIENT
Start: 2021-08-13 | End: 2021-08-14 | Stop reason: HOSPADM

## 2021-08-13 RX ORDER — GUAIFENESIN 600 MG/1
1200 TABLET, EXTENDED RELEASE ORAL 2 TIMES DAILY PRN
Status: DISCONTINUED | OUTPATIENT
Start: 2021-08-13 | End: 2021-08-14 | Stop reason: HOSPADM

## 2021-08-13 RX ORDER — BUPIVACAINE HYDROCHLORIDE AND EPINEPHRINE 2.5; 5 MG/ML; UG/ML
INJECTION, SOLUTION EPIDURAL; INFILTRATION; INTRACAUDAL; PERINEURAL
Status: DISCONTINUED
Start: 2021-08-13 | End: 2021-08-13 | Stop reason: HOSPADM

## 2021-08-13 RX ORDER — OXYCODONE HYDROCHLORIDE 5 MG/1
10 TABLET ORAL EVERY 4 HOURS PRN
Status: DISCONTINUED | OUTPATIENT
Start: 2021-08-13 | End: 2021-08-14 | Stop reason: HOSPADM

## 2021-08-13 RX ORDER — LIDOCAINE 40 MG/G
CREAM TOPICAL
Status: DISCONTINUED | OUTPATIENT
Start: 2021-08-13 | End: 2021-08-14 | Stop reason: HOSPADM

## 2021-08-13 RX ORDER — CEFAZOLIN SODIUM 2 G/100ML
2 INJECTION, SOLUTION INTRAVENOUS SEE ADMIN INSTRUCTIONS
Status: DISCONTINUED | OUTPATIENT
Start: 2021-08-13 | End: 2021-08-13 | Stop reason: HOSPADM

## 2021-08-13 RX ADMIN — SODIUM CHLORIDE, POTASSIUM CHLORIDE, SODIUM LACTATE AND CALCIUM CHLORIDE: 600; 310; 30; 20 INJECTION, SOLUTION INTRAVENOUS at 11:54

## 2021-08-13 RX ADMIN — OXYCODONE HYDROCHLORIDE 10 MG: 5 TABLET ORAL at 21:36

## 2021-08-13 RX ADMIN — FENTANYL CITRATE 50 MCG: 50 INJECTION, SOLUTION INTRAMUSCULAR; INTRAVENOUS at 09:31

## 2021-08-13 RX ADMIN — Medication 20 MG: at 09:35

## 2021-08-13 RX ADMIN — Medication 8 MCG: at 11:33

## 2021-08-13 RX ADMIN — FUROSEMIDE 20 MG: 20 TABLET ORAL at 21:36

## 2021-08-13 RX ADMIN — Medication 8 MCG: at 12:44

## 2021-08-13 RX ADMIN — Medication 5 MG: at 09:59

## 2021-08-13 RX ADMIN — LIDOCAINE HYDROCHLORIDE 100 MG: 20 INJECTION, SOLUTION INFILTRATION; PERINEURAL at 09:31

## 2021-08-13 RX ADMIN — ONDANSETRON 4 MG: 2 INJECTION INTRAMUSCULAR; INTRAVENOUS at 09:45

## 2021-08-13 RX ADMIN — CEFAZOLIN SODIUM 2 G: 2 INJECTION, SOLUTION INTRAVENOUS at 09:35

## 2021-08-13 RX ADMIN — APREPITANT 40 MG: 40 CAPSULE ORAL at 08:38

## 2021-08-13 RX ADMIN — LISINOPRIL 40 MG: 40 TABLET ORAL at 18:18

## 2021-08-13 RX ADMIN — DEXAMETHASONE SODIUM PHOSPHATE 4 MG: 4 INJECTION, SOLUTION INTRA-ARTICULAR; INTRALESIONAL; INTRAMUSCULAR; INTRAVENOUS; SOFT TISSUE at 09:45

## 2021-08-13 RX ADMIN — FENTANYL CITRATE 50 MCG: 50 INJECTION, SOLUTION INTRAMUSCULAR; INTRAVENOUS at 09:26

## 2021-08-13 RX ADMIN — Medication 5 MG: at 11:44

## 2021-08-13 RX ADMIN — ACETAMINOPHEN 975 MG: 325 TABLET, FILM COATED ORAL at 21:36

## 2021-08-13 RX ADMIN — SODIUM CHLORIDE, POTASSIUM CHLORIDE, SODIUM LACTATE AND CALCIUM CHLORIDE: 600; 310; 30; 20 INJECTION, SOLUTION INTRAVENOUS at 08:39

## 2021-08-13 RX ADMIN — SUCCINYLCHOLINE CHLORIDE 100 MG: 20 INJECTION, SOLUTION INTRAMUSCULAR; INTRAVENOUS; PARENTERAL at 09:31

## 2021-08-13 RX ADMIN — ALBUTEROL SULFATE 2 PUFF: 90 AEROSOL, METERED RESPIRATORY (INHALATION) at 12:53

## 2021-08-13 RX ADMIN — Medication 10 MG: at 12:34

## 2021-08-13 RX ADMIN — Medication 5 MG: at 10:37

## 2021-08-13 RX ADMIN — PROPOFOL 50 MCG/KG/MIN: 10 INJECTION, EMULSION INTRAVENOUS at 09:45

## 2021-08-13 RX ADMIN — HYDROMORPHONE HYDROCHLORIDE 0.5 MG: 1 INJECTION, SOLUTION INTRAMUSCULAR; INTRAVENOUS; SUBCUTANEOUS at 10:14

## 2021-08-13 RX ADMIN — GLYCOPYRROLATE 0.2 MG: 0.2 INJECTION, SOLUTION INTRAMUSCULAR; INTRAVENOUS at 09:55

## 2021-08-13 RX ADMIN — PROPOFOL 180 MG: 10 INJECTION, EMULSION INTRAVENOUS at 09:31

## 2021-08-13 RX ADMIN — POTASSIUM CHLORIDE, DEXTROSE MONOHYDRATE AND SODIUM CHLORIDE: 150; 5; 450 INJECTION, SOLUTION INTRAVENOUS at 16:01

## 2021-08-13 RX ADMIN — SENNOSIDES AND DOCUSATE SODIUM 1 TABLET: 8.6; 5 TABLET ORAL at 21:36

## 2021-08-13 RX ADMIN — FAMOTIDINE 20 MG: 10 INJECTION, SOLUTION INTRAVENOUS at 21:36

## 2021-08-13 RX ADMIN — Medication 1 LOZENGE: at 16:12

## 2021-08-13 RX ADMIN — HYDROMORPHONE HYDROCHLORIDE 0.2 MG: 0.2 INJECTION, SOLUTION INTRAMUSCULAR; INTRAVENOUS; SUBCUTANEOUS at 16:12

## 2021-08-13 RX ADMIN — HYDROMORPHONE HYDROCHLORIDE 0.4 MG: 0.2 INJECTION, SOLUTION INTRAMUSCULAR; INTRAVENOUS; SUBCUTANEOUS at 18:15

## 2021-08-13 RX ADMIN — FENTANYL CITRATE 25 MCG: 50 INJECTION, SOLUTION INTRAMUSCULAR; INTRAVENOUS at 14:14

## 2021-08-13 ASSESSMENT — MIFFLIN-ST. JEOR: SCORE: 1533.25

## 2021-08-13 ASSESSMENT — ACTIVITIES OF DAILY LIVING (ADL)
ADLS_ACUITY_SCORE: 15
ADLS_ACUITY_SCORE: 13

## 2021-08-13 NOTE — OP NOTE
General Surgery Operative Note    PREOPERATIVE DIAGNOSIS:  Metastasis from thyroid cancer (H) [C79.9, C73]    POSTOPERATIVE DIAGNOSIS:  Same    PROCEDURE:  Total thyroidectomy with recurrent laryngeal nerve monitor, right central neck dissection    Difficulty: Extremely difficult with a 22 modifier owing to recent mediastinal surgery and resultant inflammation and scarring.    ANESTHESIA:  General.    PREOPERATIVE MEDICATIONS:  Ancef IV.    SURGEON:  Jerry Hobbs MD, MD    ASSISTANT:  Beltran Del Real PA-C. First assistant was necessary due to challenging exposure and the need for improved visualization and help maintaining hemostasis.    ESTIMATED BLOOD LOSS: 50 cc's    INDICATIONS:  Harrison Thomas is a 75 year old male who was noted to have some right-sided mediastinal lymph nodes on imaging.  1 of these was biopsied and was consistent with thyroid cancer.  Patient now presents for total thyroidectomy with right central neck dissection..      DESCRIPTION OF PROCEDURE:  The patient was placed supine, head and neck in extension and a bump between the scapulae.  Transverse cervical neck creases were marked in the preinduction area and the one most suitable was utilized for exposure.  Superior and inferior skin flaps raised.  Midline fascia opened and reflected to the right. The upper pole was taken down by double ligation and division.  Middle thyroidal vein and inferior pole veins were ligated and the gland reflected medially.  The superior parathyroid and recurrent laryngeal nerve were readily seen and preserved.  Nerve conduction was confirmed with nerve monitor.  The posterior dissection was meticulously done to ligate and divide the inferior thyroidal artery and the ligament of Berry.  The inferior parathyroid was seen and preserved.  There was a significant amount of inflammation in the inferior thyroid bed consistent with his previous surgery.  There were several moderately large friable palpable  lymph nodes in the central compartment.  I proceeded with central neck dissection.  I mobilized the tissue between the trachea and the carotid sheath off of the recurrent laryngeal nerve.  Several palpable nodes were included within this dissection.  This tissue was sent off as level 6 lymph nodes.  We then proceeded with the left thyroid lobectomy.  The upper pole was taken down by double ligation and division.  Middle thyroidal vein and inferior pole veins were ligated and the gland reflected medially.  The superior parathyroid and recurrent laryngeal nerve were readily seen and preserved.  The posterior dissection was meticulously done to ligate and divide the inferior thyroidal artery and the ligament of Berry.  The inferior parathyroid was seen and preserved.  Again, there was significant inflammation in the inferior thyroid bed from his previous surgery.  We were able to control this and continued mobilizing the thyroid.  Once the remaining attachments were divided, the gland was oriented for pathology and submitted for permanent sections.  The site was inspected for hemostasis, irrigated and closed using running 3-0 Vicryl for the midline fascia, interrupted for platysma and 4-0 subcuticular Monocryl for skin.  The patient transferred to recovery in good condition.    INTRAOPERATIVE FINDINGS:  1.  Multinodular thyroid, fairly adherent at the right ligament of Berry and tracheal attachments.  2.  Both recurrent laryngeal nerves seen and preserved.  Nerve conduction confirmed with nerve monitor.  3.  Right superior parathyroid, right inferior parathyroid, left superior, and left inferior parathyroid seen and preserved.   4.  Grossly abnormal lymph nodes in the right central compartment, level 6.    Specimens:   ID Type Source Tests Collected by Time Destination   1 : Total Thyroid Tissue Thyroid SURGICAL PATHOLOGY EXAM Jerry Hobbs MD 8/13/2021 12:33 PM    2 : RIGHT LEVEL 6 LYMPH NODES Tissue Lymph  Node(s), Cervical, Right SURGICAL PATHOLOGY EXAM Jerry Hobbs MD 8/13/2021 11:23 AM        Jerry Hobbs MD, MD

## 2021-08-13 NOTE — ANESTHESIA CARE TRANSFER NOTE
Patient: Harrison Thomas    Procedure(s):  TOTAL THYROIDECTOMY  RIGHT CERVICAL LYMPH NODE BIOPSY    Diagnosis: Metastasis from thyroid cancer (H) [C79.9, C73]  Diagnosis Additional Information: No value filed.    Anesthesia Type:   General     Note:    Oropharynx: oropharynx clear of all foreign objects  Level of Consciousness: awake and drowsy  Oxygen Supplementation: face mask  Level of Supplemental Oxygen (L/min / FiO2): 6  Independent Airway: airway patency satisfactory and stable    Vital Signs Stable: post-procedure vital signs reviewed and stable  Report to RN Given: handoff report given  Patient transferred to: PACU  Comments: To PACU: Arouses easily, good airway, 02 face mask, VSS  Report to RN  Handoff Report: Identifed the Patient, Identified the Reponsible Provider, Reviewed the pertinent medical history, Discussed the surgical course, Reviewed Intra-OP anesthesia mangement and issues during anesthesia, Set expectations for post-procedure period and Allowed opportunity for questions and acknowledgement of understanding      Vitals:  Vitals Value Taken Time   /69 08/13/21 1311   Temp     Pulse 66 08/13/21 1317   Resp 48 08/13/21 1317   SpO2 98 % 08/13/21 1317   Vitals shown include unvalidated device data.    Electronically Signed By: PREETI Calloway CRNA  August 13, 2021  1:18 PM

## 2021-08-13 NOTE — ANESTHESIA PROCEDURE NOTES
Airway       Patient location during procedure: OR       Procedure Start/Stop Times: 8/13/2021 9:33 AM  Staff -        Performed By: CRNA  Consent for Airway        Urgency: elective  Indications and Patient Condition       Indications for airway management: janes-procedural       Induction type:intravenous       Mask difficulty assessment: 2 - vent by mask + OA or adjuvant +/- NMBA    Final Airway Details       Final airway type: endotracheal airway       Successful airway: ETT - single, NIM and Oral  Endotracheal Airway Details        ETT size (mm): 8.0       Cuffed: yes       Successful intubation technique: video laryngoscopy       VL Blade Size: Glidescope 4       Grade View of Cords: 1       Adjucts: stylet       Position: Right       Measured from: lips       Secured at (cm): 23       Bite block used: Soft    Post intubation assessment        Placement verified by: capnometry, equal breath sounds and chest rise        Number of attempts at approach: 1       Secured with: pink tape       Ease of procedure: easy       Dentition: Intact and Lips/oral mucosa injury (Small leo upper lip)

## 2021-08-13 NOTE — PROGRESS NOTES
PTA medications completed by Medication Scribe day of surgery     Medication history sources: Patient, Surescripts and H&P  In the past week, patient estimated taking medication this percent of the time: Greater than 90%  Adherence assessment: N/A Not Observed    Significant changes made to the medication list:  Patient reports no longer taking the following meds (med scribe removed from PTA med list): SENNA-DOC      Additional medication history information:   Patient brought own home meds: Albuterol inhaler, Trelegy inhaler    Medication reconciliation completed by provider prior to medication history? No    Time spent in this activity: 20 minutes    The information provided in this note is only as accurate as the sources available at the time of update(s)      Prior to Admission medications    Medication Sig Last Dose Taking? Auth Provider   albuterol (PROAIR HFA/PROVENTIL HFA/VENTOLIN HFA) 108 (90 Base) MCG/ACT inhaler Inhale 2 puffs into the lungs 4 times daily as needed for shortness of breath / dyspnea or wheezing > week at PRN Yes Reported, Patient   alendronate (FOSAMAX) 70 MG tablet Take 1 tablet (70 mg) by mouth every 7 days ; take with 8oz glass of water on empty stomach, remain upright and do not eat for 45 minutes. 8/6/2021 Yes Niles Cedillo MD   amLODIPine (NORVASC) 5 MG tablet Take 1 tablet (5 mg) by mouth At Bedtime  Patient taking differently: Take 5 mg by mouth every morning  8/12/2021 at AM Yes Yaneli Dozier MD   Ascorbic Acid (VITAMIN C PO) Take 1 tablet by mouth daily 8/8/2021 Yes Reported, Patient   aspirin 81 MG tablet Take 1 tablet by mouth 2 times daily  8/8/2021 Yes Reported, Patient   calcium carbonate (OS-KARLIE) 1500 (600 Ca) MG tablet Take 1 tablet by mouth daily  8/12/2021 at AM Yes Reported, Patient   Carisoprodol 250 MG TABS TAKE 1 TABLET BY MOUTH DAILY AS NEEDED FOR BACK ISSUE  Patient taking differently: Take 250 mg by mouth daily as needed  > 1 week at PRN Yes Yaneli Dozier MD    cholecalciferol 25 MCG (1000 UT) TABS Take 1,000 Units by mouth daily  8/12/2021 at AM Yes Reported, Patient   FISH OIL 1000 MG OR CAPS Take 1 g by mouth daily  8/8/2021 Yes Reported, Patient   Fluticasone-Umeclidin-Vilanterol (TRELEGY ELLIPTA) 100-62.5-25 MCG/INH oral inhaler Inhale 1 puff into the lungs daily 8/13/2021 at 0600 Yes Yaneli Dozier MD   folic acid (FOLVITE) 1 MG tablet Take 3 tablets (3 mg) by mouth daily 8/12/2021 at AM Yes Niles Cedillo MD   furosemide (LASIX) 20 MG tablet Take 1 tablet (20 mg) by mouth 2 times daily 8/8/2021 Yes Yaneli Dozier MD   guaiFENesin (MUCINEX) 600 MG 12 hr tablet Take 1,200 mg by mouth 2 times daily as needed for congestion >1 WEEK at PRN Yes Reported, Patient   HYDROcodone-acetaminophen (NORCO) 5-325 MG tablet Take 1-2 tablets by mouth 2 times daily as needed for moderate to severe pain > 1 WEEK at PRN Yes Yaneli Dozier MD   levalbuterol (XOPENEX) 0.31 MG/3ML neb solution INHALE 1 VIAL (3ML) BY NEBULIZATION EVERY 4 HOURS AS NEEDED FOR WHEEZING OR SHORTNESS OF BREATH.  Patient taking differently: Take 1 ampule by nebulization every 4 hours as needed INHALE 1 VIAL (3ML) BY NEBULIZATION EVERY 4 HOURS AS NEEDED FOR WHEEZING OR SHORTNESS OF BREATH. > 1 WEEK at PRN Yes Yaneli Dozier MD   Lidocaine (LIDOCARE) 4 % Patch Place 1 patch onto the skin daily as needed for moderate pain To prevent lidocaine toxicity, patient should be patch free for 12 hrs daily. > 1 WEEK at PRN Yes Reported, Patient   lisinopril (ZESTRIL) 40 MG tablet TAKE 1 TABLET BY MOUTH EVERY DAY  Patient taking differently: Take 40 mg by mouth daily  8/12/2021 at AM Yes Yaneli Dozier MD   methimazole (TAPAZOLE) 5 MG tablet Take 2.5 mg by mouth Every Mon, Wed, Fri Morning (1/2 X 5MG) 8/11/2021 at AM Yes Reported, Patient   methotrexate 2.5 MG tablet Take 5 tablets (12.5 mg) by mouth every 7 days . Labs required every 8-12 weeks. 8/6/2021 Yes Niles Cedillo MD   metoprolol succinate ER (TOPROL-XL) 50 MG  24 hr tablet Take 50 mg by mouth every morning 8/13/2021 at 0600 Yes Reported, Patient   multivitamin w/minerals (MULTIVITAMIN, THERAPEUTIC WITH MINERALS) tablet Take 1 tablet by mouth daily  8/12/2021 at AM Yes Reported, Patient   nitroGLYcerin (NITROSTAT) 0.4 MG sublingual tablet For chest pain place 1 tablet under the tongue every 5 minutes for 3 doses. If symptoms persist 5 minutes after 1st dose call 911. NEVER TAKEN at PRN Yes Yaneli Dozier MD   rosuvastatin (CRESTOR) 10 MG tablet Take 10 mg by mouth every evening 8/12/2021 at PM Yes Reported, Patient   sildenafil (VIAGRA) 100 MG tablet Take 100 mg by mouth daily as needed  > 1 WEEK at PRN Yes Jerry Horvath MD

## 2021-08-14 VITALS
OXYGEN SATURATION: 93 % | HEART RATE: 55 BPM | DIASTOLIC BLOOD PRESSURE: 62 MMHG | WEIGHT: 172.4 LBS | BODY MASS INDEX: 24.68 KG/M2 | SYSTOLIC BLOOD PRESSURE: 123 MMHG | HEIGHT: 70 IN | RESPIRATION RATE: 16 BRPM | TEMPERATURE: 97.4 F

## 2021-08-14 LAB — GLUCOSE BLDC GLUCOMTR-MCNC: 130 MG/DL (ref 70–99)

## 2021-08-14 PROCEDURE — 250N000013 HC RX MED GY IP 250 OP 250 PS 637: Performed by: SURGERY

## 2021-08-14 PROCEDURE — 258N000003 HC RX IP 258 OP 636: Performed by: SURGERY

## 2021-08-14 RX ORDER — PHENOL 1.4 %
1200 AEROSOL, SPRAY (ML) MUCOUS MEMBRANE EVERY 12 HOURS
Qty: 60 TABLET | Refills: 1 | Status: SHIPPED | OUTPATIENT
Start: 2021-08-14 | End: 2022-10-15

## 2021-08-14 RX ORDER — ACETAMINOPHEN 325 MG/1
650 TABLET ORAL EVERY 4 HOURS PRN
Qty: 60 TABLET | Refills: 1 | Status: ON HOLD | OUTPATIENT
Start: 2021-08-16 | End: 2022-10-21

## 2021-08-14 RX ORDER — OXYCODONE HYDROCHLORIDE 5 MG/1
5 TABLET ORAL EVERY 4 HOURS PRN
Qty: 12 TABLET | Refills: 0 | Status: SHIPPED | OUTPATIENT
Start: 2021-08-14 | End: 2021-11-01

## 2021-08-14 RX ORDER — AMOXICILLIN 250 MG
1 CAPSULE ORAL 2 TIMES DAILY
Qty: 60 TABLET | Refills: 0 | Status: SHIPPED | OUTPATIENT
Start: 2021-08-14 | End: 2022-10-15

## 2021-08-14 RX ORDER — LIOTHYRONINE SODIUM 25 UG/1
25 TABLET ORAL
Qty: 60 TABLET | Refills: 1 | Status: SHIPPED | OUTPATIENT
Start: 2021-08-14 | End: 2022-01-20

## 2021-08-14 RX ORDER — POLYETHYLENE GLYCOL 3350 17 G/17G
17 POWDER, FOR SOLUTION ORAL DAILY
Qty: 510 G | Refills: 0 | Status: SHIPPED | OUTPATIENT
Start: 2021-08-14 | End: 2021-08-14

## 2021-08-14 RX ADMIN — ACETAMINOPHEN 975 MG: 325 TABLET, FILM COATED ORAL at 06:12

## 2021-08-14 RX ADMIN — POTASSIUM CHLORIDE, DEXTROSE MONOHYDRATE AND SODIUM CHLORIDE: 150; 5; 450 INJECTION, SOLUTION INTRAVENOUS at 04:14

## 2021-08-14 RX ADMIN — FAMOTIDINE 20 MG: 20 TABLET ORAL at 08:56

## 2021-08-14 RX ADMIN — LIOTHYRONINE SODIUM 25 MCG: 25 TABLET ORAL at 08:56

## 2021-08-14 RX ADMIN — LIOTHYRONINE SODIUM 25 MCG: 25 TABLET ORAL at 12:01

## 2021-08-14 RX ADMIN — OXYCODONE HYDROCHLORIDE 10 MG: 5 TABLET ORAL at 01:33

## 2021-08-14 RX ADMIN — FUROSEMIDE 20 MG: 20 TABLET ORAL at 08:58

## 2021-08-14 RX ADMIN — AMLODIPINE BESYLATE 5 MG: 5 TABLET ORAL at 08:58

## 2021-08-14 RX ADMIN — OXYCODONE HYDROCHLORIDE 10 MG: 5 TABLET ORAL at 12:01

## 2021-08-14 RX ADMIN — LISINOPRIL 40 MG: 40 TABLET ORAL at 08:58

## 2021-08-14 RX ADMIN — Medication 1200 MG: at 03:27

## 2021-08-14 RX ADMIN — SENNOSIDES AND DOCUSATE SODIUM 1 TABLET: 8.6; 5 TABLET ORAL at 08:57

## 2021-08-14 RX ADMIN — OXYCODONE HYDROCHLORIDE 10 MG: 5 TABLET ORAL at 06:12

## 2021-08-14 ASSESSMENT — ACTIVITIES OF DAILY LIVING (ADL)
ADLS_ACUITY_SCORE: 15

## 2021-08-14 NOTE — DISCHARGE SUMMARY
"Surgery Discharge Summary    Harrison Thomas MRN# [id number]   YOB: 1945 Age: 75 year old     Date of Admission:  8/13/2021  Date of Discharge:  8/14/2021  Admitting Physician:  Jerry Hobbs MD  Discharging Service:  [facility]   Primary Provider: Yaneli Dozier    Principle Diagnosis:  Metastasis from thyroid cancer (H) [C79.9, C73]    Secondary Diagnosis:  Same    Procedures:   8/13/21 - Total thyroidectomy - Dr. Jerry Hobbs     Brief HPI: Harrison Thomas is a 75 year old year-old male who presented to [facility] for elective total thyroidectomy.       Hospital Course: Harrison Thomas underwent the surgery without complications and was admitted for monitoring and pain control.  Diet was advanced in a step-wise fashion and was tolerated without nausea or emesis.  Pain control was achieved with a combination of IV and PO medications.  Early ambulation was encouraged.  His hospital course remained uncomplicated and he recovered as anticipated.     On day of discharge, he was tolerating an oral diet, had good pain control on oral medications, was ambulating independently and remained afebrile.  He will follow-up with the surgery clinic in two weeks and was advised to call with any questions or concerns.     Inpatient Consultations: No consultations were requested during this admission    Labs/Imaging:   Results for orders placed or performed during the hospital encounter of 08/13/21 (from the past 24 hour(s))   Ionized calcium, whole blood   Result Value Ref Range    Calcium Ionized Whole Blood 4.5 4.4 - 5.2 mg/dL   Glucose by meter   Result Value Ref Range    GLUCOSE BY METER POCT 130 (H) 70 - 99 mg/dL       Disposition:   Discharged to home     Discharge Condition  Discharge condition: Stable   Discharge vitals: Blood pressure 123/62, pulse 55, temperature 97.4  F (36.3  C), temperature source Axillary, resp. rate 16, height 1.778 m (5' 10\"), weight 78.2 kg " (172 lb 6.4 oz), SpO2 93 %.     Discharge Medications:   Current Discharge Medication List      START taking these medications    Details   acetaminophen (TYLENOL) 325 MG tablet Take 2 tablets (650 mg) by mouth every 4 hours as needed for other (For optimal non-opioid multimodal pain management to improve pain control.)  Qty: 60 tablet, Refills: 1    Associated Diagnoses: Metastasis from thyroid cancer (H)      liothyronine (CYTOMEL) 25 MCG tablet Take 1 tablet (25 mcg) by mouth 2 times daily  Qty: 60 tablet, Refills: 1    Associated Diagnoses: Metastasis from thyroid cancer (H)      oxyCODONE (ROXICODONE) 5 MG tablet Take 1 tablet (5 mg) by mouth every 4 hours as needed for pain  Qty: 12 tablet, Refills: 0    Associated Diagnoses: Metastasis from thyroid cancer (H)      senna-docusate (SENOKOT-S/PERICOLACE) 8.6-50 MG tablet Take 1 tablet by mouth 2 times daily  Qty: 60 tablet, Refills: 0    Associated Diagnoses: Metastasis from thyroid cancer (H)         CONTINUE these medications which have CHANGED    Details   calcium carbonate (OS-KARLIE) 600 MG tablet Take 2 tablets (1,200 mg) by mouth every 12 hours  Qty: 60 tablet, Refills: 1    Associated Diagnoses: Metastasis from thyroid cancer (H)         CONTINUE these medications which have NOT CHANGED    Details   albuterol (PROAIR HFA/PROVENTIL HFA/VENTOLIN HFA) 108 (90 Base) MCG/ACT inhaler Inhale 2 puffs into the lungs 4 times daily as needed for shortness of breath / dyspnea or wheezing      alendronate (FOSAMAX) 70 MG tablet Take 1 tablet (70 mg) by mouth every 7 days ; take with 8oz glass of water on empty stomach, remain upright and do not eat for 45 minutes.  Qty: 12 tablet, Refills: 3    Associated Diagnoses: Osteopenia, unspecified location      amLODIPine (NORVASC) 5 MG tablet Take 1 tablet (5 mg) by mouth At Bedtime  Qty: 180 tablet, Refills: 3    Associated Diagnoses: Benign essential hypertension      Ascorbic Acid (VITAMIN C PO) Take 1 tablet by mouth daily       aspirin 81 MG tablet Take 1 tablet by mouth 2 times daily       Carisoprodol 250 MG TABS TAKE 1 TABLET BY MOUTH DAILY AS NEEDED FOR BACK ISSUE  Qty: 31 tablet, Refills: 1    Comments: Not to exceed 4 additional fills before 08/11/2021 DX Code Needed  PT HAS ONLY 2 LEFT PLEASE SEND OVER.  Associated Diagnoses: Back muscle spasm      cholecalciferol 25 MCG (1000 UT) TABS Take 1,000 Units by mouth daily       FISH OIL 1000 MG OR CAPS Take 1 g by mouth daily       Fluticasone-Umeclidin-Vilanterol (TRELEGY ELLIPTA) 100-62.5-25 MCG/INH oral inhaler Inhale 1 puff into the lungs daily  Qty: 90 each, Refills: 3    Associated Diagnoses: Obstructive chronic bronchitis with exacerbation (H)      folic acid (FOLVITE) 1 MG tablet Take 3 tablets (3 mg) by mouth daily  Qty: 270 tablet, Refills: 1    Associated Diagnoses: Rheumatoid arthritis involving multiple sites with positive rheumatoid factor (H)      furosemide (LASIX) 20 MG tablet Take 1 tablet (20 mg) by mouth 2 times daily  Qty: 180 tablet, Refills: 3    Associated Diagnoses: Edema, unspecified type      guaiFENesin (MUCINEX) 600 MG 12 hr tablet Take 1,200 mg by mouth 2 times daily as needed for congestion      levalbuterol (XOPENEX) 0.31 MG/3ML neb solution INHALE 1 VIAL (3ML) BY NEBULIZATION EVERY 4 HOURS AS NEEDED FOR WHEEZING OR SHORTNESS OF BREATH.  Qty: 720 mL, Refills: 4    Associated Diagnoses: COPD exacerbation (H)      Lidocaine (LIDOCARE) 4 % Patch Place 1 patch onto the skin daily as needed for moderate pain To prevent lidocaine toxicity, patient should be patch free for 12 hrs daily.      lisinopril (ZESTRIL) 40 MG tablet TAKE 1 TABLET BY MOUTH EVERY DAY  Qty: 90 tablet, Refills: 3    Associated Diagnoses: Benign essential hypertension      methimazole (TAPAZOLE) 5 MG tablet Take 2.5 mg by mouth Every Mon, Wed, Fri Morning (1/2 X 5MG)      methotrexate 2.5 MG tablet Take 5 tablets (12.5 mg) by mouth every 7 days . Labs required every 8-12 weeks.  Qty: 65  tablet, Refills: 0    Associated Diagnoses: Rheumatoid arthritis involving multiple sites with positive rheumatoid factor (H)      metoprolol succinate ER (TOPROL-XL) 50 MG 24 hr tablet Take 50 mg by mouth every morning      multivitamin w/minerals (MULTIVITAMIN, THERAPEUTIC WITH MINERALS) tablet Take 1 tablet by mouth daily       nitroGLYcerin (NITROSTAT) 0.4 MG sublingual tablet For chest pain place 1 tablet under the tongue every 5 minutes for 3 doses. If symptoms persist 5 minutes after 1st dose call 911.  Qty: 21 tablet, Refills: 0    Associated Diagnoses: Coronary artery disease involving native coronary artery of native heart without angina pectoris      rosuvastatin (CRESTOR) 10 MG tablet Take 10 mg by mouth every evening      sildenafil (VIAGRA) 100 MG tablet Take 100 mg by mouth daily as needed   Qty: 20 tablet, Refills: 6    Associated Diagnoses: Benign prostatic hyperplasia with incomplete bladder emptying         STOP taking these medications       HYDROcodone-acetaminophen (NORCO) 5-325 MG tablet Comments:   Reason for Stopping:               Discharge Instructions:   Follow-up Appointments     Follow-up and recommended labs and tests       Follow up with Dr. Jerry Hobbs , at (location with clinic name or city)   [facility] General Surgery clinic, within 2 weeks  to evaluate   after surgery. No follow up labs or test are needed.           After Care Instructions     Activity      Your activity upon discharge: no heavy lifting for 2 weeks         Diet      Follow this diet upon discharge: Orders Placed This Encounter      Advance Diet as Tolerated: Regular Diet Adult         Wound care and dressings      Instructions to care for your wound at home:   - Remove outer dressings tomorrow  - You may shower tomorrow  - The white bandages on your skin will fall off on their own in the shower in about 7 days.                   Mariano Zarco MD   8/14/2021 at 10:26 AM  General Surgery -  PGY4  [phone]

## 2021-08-14 NOTE — PLAN OF CARE
A&O x 4. Bradycardic, otherwise VSS on room air. Independent in room. regular diet, tolerating well. PIV removed for discharge. Neck dressing CDI, ecchymosis. Pain managed with prn oxycodone.  Voiding spontaneously. BS active, +flatus. Adequate for discharge.     Discharge instructions reviewed with patient. All questions answered. Belongings returned at discharge. Prescription medications given to and reviewed with patient. Patient discharged home via car with spouse.

## 2021-08-14 NOTE — PROGRESS NOTES
"[facility]  GENERAL SURGERY Progress Note    Admission Date: 8/13/2021 8/14/2021         Assessment and Plan:     Harrison Thomas is a 75 year old male who previously underwent mediastinal lymph node biopsy which returned positive for thyroid cancer, underwent total thyroidectomy on 8/13/21 and was admitted for post-op cares. Recovering well    - Regular diet  - Calcium carbonate 1200 mg BID  - Liothyronine 25 mcg BID  - Pain control with tylenol and oxycodone  - Senna and miralax  - Ok to discharge today             Interval History:     States he has some sore throat this morning, denies any dyspnea or sensation of throat swelling. Tolerating PO liquids. On 1 L O2 via nasal canula, states he occasionally uses oxygen at home. Pain controlled with medication. No nausea or emesis, no fevers or chills                     Physical Exam:   Blood pressure 133/64, pulse 54, temperature 97.6  F (36.4  C), temperature source Oral, resp. rate 16, height 1.778 m (5' 10\"), weight 78.2 kg (172 lb 6.4 oz), SpO2 97 %.  I/O last 3 completed shifts:  In: 1350 [I.V.:1350]  Out: 725 [Urine:675; Blood:50]  Constitutional:  Awake, alert, oriented, and in no apparent distress.   Lungs: No increased work of breathing, good air exchange on nasal canula   Neck: Neck incision with dressing clean/dry/intact. Slight ecchymosis over right side of incision, though no edema or hematoma present   Cardiovascular: Regular rate and rhythm   Abdomen: Soft, non-distended   Extremities: No edema or calf tenderness. +SCDs          Data:     Recent Labs   Lab Test 07/02/21  0712 06/25/21 2120 06/03/21  0936   WBC 6.3 9.5 6.9   HGB 13.2* 15.0 13.0*   HCT 41.2 46.1 39.8*    208 229      Recent Labs   Lab Test 08/13/21  0734 07/20/21  0759 07/02/21  0712 06/25/21 2120 06/03/21  0936   NA  --   --  142 139 142   POTASSIUM 4.1 4.2 3.9 4.1 4.5   CHLORIDE  --   --  109 104 109   CO2  --   --  29 30 29   BUN  --   --  16 20 11   CR  " --   --  0.89 1.00 0.85     Recent Labs   Lab Test 06/25/21 2120 06/03/21  0936 03/03/21  1010   BILITOTAL 0.4 0.6 0.4   ALT 23 16 27   AST 13 12 12   ALKPHOS 164* 154* 91      Recent Labs   Lab Test 07/02/21 0712   INR 0.99     Recent Labs   Lab Test 07/02/21 0712 06/25/21 2120 06/03/21  0936   KARLIE 8.9 9.2 9.2     Recent Labs   Lab Test 07/02/21 0712 06/25/21 2120 06/03/21  0936 03/03/21  1010 04/18/19  2238 04/05/19  1225 04/01/19  1124 03/26/19  1145   ANIONGAP 4 5 4  --   --   --   --   --    PROTEIN  --   --   --   --   --  Negative Negative Negative   ALBUMIN  --  3.9 3.5 3.6   < >  --   --   --     < > = values in this interval not displayed.       Mariano Zarco  General Surgery Resident - PGY4  [phone]

## 2021-08-14 NOTE — PLAN OF CARE
Pt is A&O, AVSS 94% on 1 liters/nc. Neck incision WDL. Pain control with oxycodone. Voiding. Up with SBA.

## 2021-08-16 ENCOUNTER — PATIENT OUTREACH (OUTPATIENT)
Dept: CARE COORDINATION | Facility: CLINIC | Age: 76
End: 2021-08-16

## 2021-08-16 ENCOUNTER — TELEPHONE (OUTPATIENT)
Dept: SURGERY | Facility: CLINIC | Age: 76
End: 2021-08-16

## 2021-08-16 DIAGNOSIS — Z71.89 OTHER SPECIFIED COUNSELING: ICD-10-CM

## 2021-08-16 LAB
PATH REPORT.COMMENTS IMP SPEC: ABNORMAL
PATH REPORT.COMMENTS IMP SPEC: YES
PATH REPORT.FINAL DX SPEC: ABNORMAL
PATH REPORT.GROSS SPEC: ABNORMAL
PATH REPORT.MICROSCOPIC SPEC OTHER STN: ABNORMAL
PATH REPORT.RELEVANT HX SPEC: ABNORMAL
PHOTO IMAGE: ABNORMAL

## 2021-08-16 PROCEDURE — 88307 TISSUE EXAM BY PATHOLOGIST: CPT | Mod: 26 | Performed by: PATHOLOGY

## 2021-08-16 RX ORDER — HYDROCODONE BITARTRATE AND ACETAMINOPHEN 5; 325 MG/1; MG/1
1 TABLET ORAL EVERY 4 HOURS PRN
Qty: 180 TABLET | Refills: 0 | Status: SHIPPED | OUTPATIENT
Start: 2021-08-16 | End: 2021-10-04

## 2021-08-16 NOTE — TELEPHONE ENCOUNTER
PCP: patient called back in regards to message below,   Patient received script for Norco 15 tablets. Usually receives #180 tablets based on previous scripts.   Please advise, pended script. Previous scripted discontinued.      Anastasiia Weiss RN  [facility]

## 2021-08-16 NOTE — PROGRESS NOTES
Clinic Care Coordination Contact  [facility]: Post-Discharge Note  SITUATION                                                      Admission:    Admission Date: 08/13/21   Reason for Admission: Metastisis of thyroid cancer  Discharge:   Discharge Date: 08/14/21  Discharge Diagnosis: Metastisis of thyroid cancer    BACKGROUND                                                       Harrison Thomas underwent the surgery without complications and was admitted for monitoring and pain control.  Diet was advanced in a step-wise fashion and was tolerated without nausea or emesis.  Pain control was achieved with a combination of IV and PO medications.  Early ambulation was encouraged.  His hospital course remained uncomplicated and he recovered as anticipated.    ASSESSMENT      Discharge Assessment  How are you doing now that you are home?: sore  How are your symptoms? (Red Flag symptoms escalate to triage hotline per guidelines): Improved  Do you feel your condition is stable enough to be safe at home until your provider visit?: Yes  Does the patient have their discharge instructions? : Yes  Does the patient have questions regarding their discharge instructions? : No  Were you started on any new medications or were there changes to any of your previous medications? : Yes - Schedule [facility] appt within 48 business hours  Does the patient have all of their medications?: Yes  Do you have questions regarding any of your medications? : No  Do you have all of your needed medical supplies or equipment (DME)?  (i.e. oxygen tank, CPAP, cane, etc.): Yes  Discharge follow-up appointment scheduled within 14 calendar days? : Yes  Discharge Follow Up Appointment Date: 08/20/21  Discharge Follow Up Appointment Scheduled with?: Specialty Care Provider    Post-op  Did the patient have surgery or a procedure: Yes  Drainage: No  Bleeding: none  Fever: No  Chills: No  Redness: No  Warmth: No  Swelling: Yes  Incision site pain:  Yes  Closure: dissolving  Eating & Drinking: unable to tolerate solid foods (swallowing and talking is difficult)  PO Intake: soft foods  Bowel Function: normal  Date of last BM: 08/15/21  Urinary Status: voiding without complaint/concerns      PLAN                                                      Outpatient Plan:  Follow-up Appointments     Follow-up and recommended labs and tests       Follow up with Dr. Jerry Hobbs , at (location with clinic name or city)   [facility] Surgery clinic, within 2 weeks  to evaluate   after surgery. No follow up labs or test are needed.       Future Appointments   Date Time Provider Department Center   8/20/2021  4:00 PM Demarcus Simmons MD [facility]    9/1/2021 10:00 AM CS [facility]   11/15/2021  9:30 AM Yaneli Dozier MD [facility]    11/29/2021 10:00 AM CS [facility]   12/1/2021  1:20 PM Niles Cedillo MD [facility]   12/10/2021  8:00 AM Khoa Handy MD [facility]         For any urgent concerns, please contact our 24 hour nurse triage line: [phone] ([phone])           Kellen LO Community Health Worker  Clinic Care Coordination  [facility]  Phone: [phone]

## 2021-08-17 NOTE — TELEPHONE ENCOUNTER
"Surgery:    Patient is a 75-year-old male who recently underwent thyroidectomy.  He calls in this evening, as he took his calcium pill and now feels as though it is stuck in his throat.  He has tried eating bread, bananas, pudding, and Jell-O to try to get the pill to go down.  He feels as though it is still stuck.  He cannot cough the pill up.  He is not having any difficulty with respirations.  I have encouraged the patient to try drinking some warm liquids to help partially dissolve the pill.  I also explained the concept of \"foreign body sensation \", which can last for a period of time.  The patient was given the option of presenting to the emergency department for further evaluation versus calling the clinic tomorrow morning, if he still feels as though the pill is stuck.  He was wondering if it was safe to go to sleep tonight.  I encouraged him to sleep sitting up in chair, not lying flat.  The patient is in agreement with this plan.    Lore Espinosa MD    "

## 2021-08-20 ENCOUNTER — VIRTUAL VISIT (OUTPATIENT)
Dept: ENDOCRINOLOGY | Facility: CLINIC | Age: 76
End: 2021-08-20
Payer: COMMERCIAL

## 2021-08-20 DIAGNOSIS — C79.9 METASTASIS FROM THYROID CANCER (H): Primary | ICD-10-CM

## 2021-08-20 DIAGNOSIS — E89.0 POSTSURGICAL HYPOTHYROIDISM: ICD-10-CM

## 2021-08-20 DIAGNOSIS — C73 PAPILLARY THYROID CARCINOMA (H): ICD-10-CM

## 2021-08-20 DIAGNOSIS — C73 METASTASIS FROM THYROID CANCER (H): Primary | ICD-10-CM

## 2021-08-20 PROCEDURE — 99214 OFFICE O/P EST MOD 30 MIN: CPT | Mod: 95 | Performed by: INTERNAL MEDICINE
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
## 2021-08-21 ENCOUNTER — MYC MEDICAL ADVICE (OUTPATIENT)
Dept: ENDOCRINOLOGY | Facility: CLINIC | Age: 76
End: 2021-08-21

## 2021-08-23 ENCOUNTER — MYC MEDICAL ADVICE (OUTPATIENT)
Dept: ENDOCRINOLOGY | Facility: CLINIC | Age: 76
End: 2021-08-23

## 2021-08-23 DIAGNOSIS — C73 PAPILLARY THYROID CARCINOMA (H): ICD-10-CM

## 2021-08-23 DIAGNOSIS — E89.0 POSTSURGICAL HYPOTHYROIDISM: Primary | ICD-10-CM

## 2021-08-23 RX ORDER — LEVOTHYROXINE SODIUM 137 UG/1
137 TABLET ORAL DAILY
Qty: 30 TABLET | Refills: 5 | Status: SHIPPED | OUTPATIENT
Start: 2021-08-23 | End: 2021-09-17

## 2021-08-24 ENCOUNTER — OFFICE VISIT (OUTPATIENT)
Dept: SURGERY | Facility: CLINIC | Age: 76
End: 2021-08-24
Payer: COMMERCIAL

## 2021-08-24 DIAGNOSIS — Z09 SURGERY FOLLOW-UP EXAMINATION: Primary | ICD-10-CM

## 2021-08-24 PROCEDURE — 99024 POSTOP FOLLOW-UP VISIT: CPT | Performed by: SURGERY

## 2021-08-24 NOTE — PROGRESS NOTES
Surgery Postop Note    Harrison Thomas presents today for surgical followup.  he is doing well following total thyroidectomy with right central neck dissection.  Final pathology revealed multifocal papillary thyroid cancer with 4 out of 4 positive lymph nodes.  Incisions look fine with no signs of wound infection.  His voice is largely normal and he has no signs of hypocalcemia.  I have advised him to decrease his calcium supplementation at this time.  Plan is for high-dose radioactive iodine later this month.  I expect him to make a complete recovery.  Thank you for the opportunity to help in his care.    Celso Hobbs M.D.  [facility]  [phone]    Please route or send letter to:  Primary Care Provider (PCP) and Referring Provider

## 2021-08-31 ENCOUNTER — IMMUNIZATION (OUTPATIENT)
Dept: NURSING | Facility: CLINIC | Age: 76
End: 2021-08-31
Payer: COMMERCIAL

## 2021-08-31 PROCEDURE — 0003A PR COVID VAC PFIZER DIL RECON 30 MCG/0.3 ML IM: CPT

## 2021-08-31 PROCEDURE — 91300 PR COVID VAC PFIZER DIL RECON 30 MCG/0.3 ML IM: CPT

## 2021-09-01 ENCOUNTER — LAB (OUTPATIENT)
Dept: LAB | Facility: CLINIC | Age: 76
End: 2021-09-01
Payer: COMMERCIAL

## 2021-09-01 DIAGNOSIS — M85.80 OSTEOPENIA, UNSPECIFIED LOCATION: ICD-10-CM

## 2021-09-01 DIAGNOSIS — M05.79 RHEUMATOID ARTHRITIS INVOLVING MULTIPLE SITES WITH POSITIVE RHEUMATOID FACTOR (H): ICD-10-CM

## 2021-09-01 DIAGNOSIS — Z79.899 HIGH RISK MEDICATIONS (NOT ANTICOAGULANTS) LONG-TERM USE: ICD-10-CM

## 2021-09-01 LAB
ALBUMIN SERPL-MCNC: 3.7 G/DL (ref 3.4–5)
ALP SERPL-CCNC: 103 U/L (ref 40–150)
ALT SERPL W P-5'-P-CCNC: 20 U/L (ref 0–70)
AST SERPL W P-5'-P-CCNC: 12 U/L (ref 0–45)
BASOPHILS # BLD AUTO: 0 10E3/UL (ref 0–0.2)
BASOPHILS NFR BLD AUTO: 0 %
BILIRUB DIRECT SERPL-MCNC: 0.2 MG/DL (ref 0–0.2)
BILIRUB SERPL-MCNC: 0.9 MG/DL (ref 0.2–1.3)
CREAT SERPL-MCNC: 0.94 MG/DL (ref 0.66–1.25)
CRP SERPL-MCNC: 6.9 MG/L (ref 0–8)
DEPRECATED CALCIDIOL+CALCIFEROL SERPL-MC: 65 UG/L (ref 20–75)
EOSINOPHIL # BLD AUTO: 0.3 10E3/UL (ref 0–0.7)
EOSINOPHIL NFR BLD AUTO: 3 %
ERYTHROCYTE [DISTWIDTH] IN BLOOD BY AUTOMATED COUNT: 15.6 % (ref 10–15)
ERYTHROCYTE [SEDIMENTATION RATE] IN BLOOD BY WESTERGREN METHOD: 11 MM/HR (ref 0–20)
GFR SERPL CREATININE-BSD FRML MDRD: 80 ML/MIN/1.73M2
HCT VFR BLD AUTO: 42.3 % (ref 40–53)
HGB BLD-MCNC: 14.3 G/DL (ref 13.3–17.7)
LYMPHOCYTES # BLD AUTO: 0.5 10E3/UL (ref 0.8–5.3)
LYMPHOCYTES NFR BLD AUTO: 7 %
MCH RBC QN AUTO: 30.6 PG (ref 26.5–33)
MCHC RBC AUTO-ENTMCNC: 33.8 G/DL (ref 31.5–36.5)
MCV RBC AUTO: 91 FL (ref 78–100)
MONOCYTES # BLD AUTO: 0.8 10E3/UL (ref 0–1.3)
MONOCYTES NFR BLD AUTO: 10 %
NEUTROPHILS # BLD AUTO: 6.5 10E3/UL (ref 1.6–8.3)
NEUTROPHILS NFR BLD AUTO: 80 %
PLATELET # BLD AUTO: 189 10E3/UL (ref 150–450)
PROT SERPL-MCNC: 7.1 G/DL (ref 6.8–8.8)
RBC # BLD AUTO: 4.67 10E6/UL (ref 4.4–5.9)
WBC # BLD AUTO: 8.1 10E3/UL (ref 4–11)

## 2021-09-01 PROCEDURE — 82306 VITAMIN D 25 HYDROXY: CPT

## 2021-09-01 PROCEDURE — 86140 C-REACTIVE PROTEIN: CPT

## 2021-09-01 PROCEDURE — 85652 RBC SED RATE AUTOMATED: CPT

## 2021-09-01 PROCEDURE — 80076 HEPATIC FUNCTION PANEL: CPT

## 2021-09-01 PROCEDURE — 36415 COLL VENOUS BLD VENIPUNCTURE: CPT

## 2021-09-01 PROCEDURE — 85025 COMPLETE CBC W/AUTO DIFF WBC: CPT

## 2021-09-01 PROCEDURE — 82565 ASSAY OF CREATININE: CPT

## 2021-09-04 ENCOUNTER — HEALTH MAINTENANCE LETTER (OUTPATIENT)
Age: 76
End: 2021-09-04

## 2021-09-16 DIAGNOSIS — M05.79 RHEUMATOID ARTHRITIS INVOLVING MULTIPLE SITES WITH POSITIVE RHEUMATOID FACTOR (H): ICD-10-CM

## 2021-09-16 NOTE — TELEPHONE ENCOUNTER
Methotrexate refill needed. Patient received 1 week refill because labs are due. Labs were done 9/1/21. Next labs are not due until 11/29.     Please call patient    [facility] in [place]

## 2021-09-16 NOTE — TELEPHONE ENCOUNTER
Pending Prescriptions:                       Disp   Refills    methotrexate 2.5 MG tablet                65 tab*0            Sig: Take 5 tablets (12.5 mg) by mouth every 7 days .           Labs required every 8-12 weeks.    Labs done 9/1/21 and all were good and last office visit 8/4/21.    Routing refill request to provider for review/approval because:  Drug not on the G refill protocol     Kylah CALDERON RN, [facility] 09/16/21 12:03 PM

## 2021-09-17 DIAGNOSIS — E89.0 POSTSURGICAL HYPOTHYROIDISM: ICD-10-CM

## 2021-09-17 RX ORDER — LEVOTHYROXINE SODIUM 137 UG/1
137 TABLET ORAL DAILY
Qty: 30 TABLET | Refills: 5 | Status: SHIPPED | OUTPATIENT
Start: 2021-09-17 | End: 2021-11-09

## 2021-09-17 NOTE — TELEPHONE ENCOUNTER
Rheumatology team: Please call to notify Mr. Thomas that methotrexate has been refilled.  Niles Cedillo MD  9/16/2021 9:40 PM

## 2021-09-20 ENCOUNTER — LAB (OUTPATIENT)
Dept: LAB | Facility: CLINIC | Age: 76
End: 2021-09-20
Payer: COMMERCIAL

## 2021-09-20 DIAGNOSIS — E89.0 POSTSURGICAL HYPOTHYROIDISM: ICD-10-CM

## 2021-09-20 LAB — TSH SERPL DL<=0.005 MIU/L-ACNC: 27.46 MU/L (ref 0.4–4)

## 2021-09-20 PROCEDURE — 36415 COLL VENOUS BLD VENIPUNCTURE: CPT

## 2021-09-20 PROCEDURE — 84443 ASSAY THYROID STIM HORMONE: CPT

## 2021-09-22 ENCOUNTER — HOSPITAL ENCOUNTER (OUTPATIENT)
Dept: NUCLEAR MEDICINE | Facility: CLINIC | Age: 76
Setting detail: NUCLEAR MEDICINE
Discharge: HOME | End: 2021-09-22
Attending: INTERNAL MEDICINE | Admitting: INTERNAL MEDICINE
Payer: COMMERCIAL

## 2021-09-22 DIAGNOSIS — E89.0 POSTSURGICAL HYPOTHYROIDISM: ICD-10-CM

## 2021-09-22 DIAGNOSIS — C73 PAPILLARY THYROID CARCINOMA (H): ICD-10-CM

## 2021-09-22 PROCEDURE — 344N000001 HC RX 344: Performed by: INTERNAL MEDICINE

## 2021-09-22 PROCEDURE — 79005 NUCLEAR RX ORAL ADMIN: CPT

## 2021-09-22 PROCEDURE — A9517 I131 IODIDE CAP, RX: HCPCS | Performed by: INTERNAL MEDICINE

## 2021-09-22 RX ADMIN — Medication 100.3 MILLICURIE: at 09:24

## 2021-09-29 ENCOUNTER — VIRTUAL VISIT (OUTPATIENT)
Dept: ENDOCRINOLOGY | Facility: CLINIC | Age: 76
End: 2021-09-29
Payer: COMMERCIAL

## 2021-09-29 ENCOUNTER — HOSPITAL ENCOUNTER (OUTPATIENT)
Dept: NUCLEAR MEDICINE | Facility: CLINIC | Age: 76
Setting detail: NUCLEAR MEDICINE
Discharge: HOME | End: 2021-09-29
Attending: INTERNAL MEDICINE | Admitting: INTERNAL MEDICINE
Payer: COMMERCIAL

## 2021-09-29 DIAGNOSIS — E89.0 POSTSURGICAL HYPOTHYROIDISM: ICD-10-CM

## 2021-09-29 DIAGNOSIS — C73 PAPILLARY THYROID CARCINOMA (H): ICD-10-CM

## 2021-09-29 DIAGNOSIS — E89.0 POSTSURGICAL HYPOTHYROIDISM: Primary | ICD-10-CM

## 2021-09-29 PROCEDURE — 78830 RP LOCLZJ TUM SPECT W/CT 1: CPT

## 2021-09-29 PROCEDURE — 99214 OFFICE O/P EST MOD 30 MIN: CPT | Mod: 95 | Performed by: INTERNAL MEDICINE
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
## 2021-10-04 DIAGNOSIS — M48.062 SPINAL STENOSIS OF LUMBAR REGION WITH NEUROGENIC CLAUDICATION: ICD-10-CM

## 2021-10-04 RX ORDER — HYDROCODONE BITARTRATE AND ACETAMINOPHEN 5; 325 MG/1; MG/1
1 TABLET ORAL EVERY 4 HOURS PRN
Qty: 180 TABLET | Refills: 0 | Status: SHIPPED | OUTPATIENT
Start: 2021-10-04 | End: 2021-11-01

## 2021-10-04 NOTE — TELEPHONE ENCOUNTER
Patient called for Rx Hydrocodone refill.     Last Written Prescription Date:  8-16-21  Last Fill Quantity: 180,  # refills: 0   Last office visit: 8/10/2021 with prescribing provider:  Dr Dozier   Future Office Visit:   Next 5 appointments (look out 90 days)    Nov 15, 2021  9:30 AM  [facility] Visit [facility]  [facility] [address] [facility] [address]  [facility] [address]  [phone]   Dec 01, 2021  1:20 PM  Return Visit with Niles Cedillo MD  [facility] ([facility] ) [address] [place] [id number]  [phone]             Brenda COLEMAN, RN      October 4, 2021  12:03 PM

## 2021-10-25 ENCOUNTER — TRANSFERRED RECORDS (OUTPATIENT)
Dept: HEALTH INFORMATION MANAGEMENT | Facility: CLINIC | Age: 76
End: 2021-10-25
Payer: COMMERCIAL

## 2021-10-29 ENCOUNTER — TRANSFERRED RECORDS (OUTPATIENT)
Dept: HEALTH INFORMATION MANAGEMENT | Facility: CLINIC | Age: 76
End: 2021-10-29
Payer: COMMERCIAL

## 2021-11-01 ENCOUNTER — OFFICE VISIT (OUTPATIENT)
Dept: FAMILY MEDICINE | Facility: CLINIC | Age: 76
End: 2021-11-01
Payer: COMMERCIAL

## 2021-11-01 VITALS
HEIGHT: 70 IN | RESPIRATION RATE: 16 BRPM | SYSTOLIC BLOOD PRESSURE: 137 MMHG | OXYGEN SATURATION: 97 % | BODY MASS INDEX: 24.95 KG/M2 | TEMPERATURE: 97.2 F | HEART RATE: 68 BPM | DIASTOLIC BLOOD PRESSURE: 79 MMHG | WEIGHT: 174.3 LBS

## 2021-11-01 DIAGNOSIS — F41.8 SITUATIONAL ANXIETY: ICD-10-CM

## 2021-11-01 DIAGNOSIS — C73 PAPILLARY THYROID CARCINOMA (H): ICD-10-CM

## 2021-11-01 DIAGNOSIS — I25.10 CORONARY ARTERY DISEASE INVOLVING NATIVE CORONARY ARTERY OF NATIVE HEART WITHOUT ANGINA PECTORIS: ICD-10-CM

## 2021-11-01 DIAGNOSIS — M48.062 SPINAL STENOSIS OF LUMBAR REGION WITH NEUROGENIC CLAUDICATION: ICD-10-CM

## 2021-11-01 DIAGNOSIS — D84.9 IMMUNODEFICIENCY (H): ICD-10-CM

## 2021-11-01 DIAGNOSIS — Z79.899 HIGH RISK MEDICATIONS (NOT ANTICOAGULANTS) LONG-TERM USE: ICD-10-CM

## 2021-11-01 DIAGNOSIS — E89.0 POSTSURGICAL HYPOTHYROIDISM: ICD-10-CM

## 2021-11-01 DIAGNOSIS — I10 BENIGN ESSENTIAL HYPERTENSION: ICD-10-CM

## 2021-11-01 DIAGNOSIS — R91.1 LUNG NODULE: ICD-10-CM

## 2021-11-01 DIAGNOSIS — H25.9 AGE-RELATED CATARACT OF BOTH EYES, UNSPECIFIED AGE-RELATED CATARACT TYPE: ICD-10-CM

## 2021-11-01 DIAGNOSIS — Z01.818 PRE-OPERATIVE GENERAL PHYSICAL EXAMINATION: Primary | ICD-10-CM

## 2021-11-01 DIAGNOSIS — G47.33 OSA (OBSTRUCTIVE SLEEP APNEA): ICD-10-CM

## 2021-11-01 DIAGNOSIS — C79.9 METASTASIS FROM THYROID CANCER (H): ICD-10-CM

## 2021-11-01 DIAGNOSIS — J44.9 CHRONIC OBSTRUCTIVE PULMONARY DISEASE, UNSPECIFIED COPD TYPE (H): ICD-10-CM

## 2021-11-01 DIAGNOSIS — C34.11 MALIGNANT NEOPLASM OF UPPER LOBE OF RIGHT LUNG (H): ICD-10-CM

## 2021-11-01 DIAGNOSIS — F11.90 CHRONIC, CONTINUOUS USE OF OPIOIDS: ICD-10-CM

## 2021-11-01 DIAGNOSIS — M05.79 RHEUMATOID ARTHRITIS INVOLVING MULTIPLE SITES WITH POSITIVE RHEUMATOID FACTOR (H): ICD-10-CM

## 2021-11-01 DIAGNOSIS — C73 METASTASIS FROM THYROID CANCER (H): ICD-10-CM

## 2021-11-01 LAB
AMPHETAMINES UR QL: NOT DETECTED
BARBITURATES UR QL SCN: NOT DETECTED
BASOPHILS # BLD AUTO: 0 10E3/UL (ref 0–0.2)
BASOPHILS NFR BLD AUTO: 0 %
BENZODIAZ UR QL SCN: NOT DETECTED
BUPRENORPHINE UR QL: NOT DETECTED
CANNABINOIDS UR QL: NOT DETECTED
COCAINE UR QL SCN: NOT DETECTED
CRP SERPL-MCNC: 28 MG/L (ref 0–8)
D-METHAMPHET UR QL: NOT DETECTED
EOSINOPHIL # BLD AUTO: 0.3 10E3/UL (ref 0–0.7)
EOSINOPHIL NFR BLD AUTO: 3 %
ERYTHROCYTE [DISTWIDTH] IN BLOOD BY AUTOMATED COUNT: 14.9 % (ref 10–15)
ERYTHROCYTE [SEDIMENTATION RATE] IN BLOOD BY WESTERGREN METHOD: 29 MM/HR (ref 0–20)
HBA1C MFR BLD: 5.1 % (ref 0–5.6)
HCT VFR BLD AUTO: 42.6 % (ref 40–53)
HGB BLD-MCNC: 14.3 G/DL (ref 13.3–17.7)
LYMPHOCYTES # BLD AUTO: 1.1 10E3/UL (ref 0.8–5.3)
LYMPHOCYTES NFR BLD AUTO: 13 %
MCH RBC QN AUTO: 32.2 PG (ref 26.5–33)
MCHC RBC AUTO-ENTMCNC: 33.6 G/DL (ref 31.5–36.5)
MCV RBC AUTO: 96 FL (ref 78–100)
METHADONE UR QL SCN: NOT DETECTED
MONOCYTES # BLD AUTO: 1 10E3/UL (ref 0–1.3)
MONOCYTES NFR BLD AUTO: 11 %
NEUTROPHILS # BLD AUTO: 6.6 10E3/UL (ref 1.6–8.3)
NEUTROPHILS NFR BLD AUTO: 73 %
OPIATES UR QL SCN: DETECTED
OXYCODONE UR QL SCN: NOT DETECTED
PCP UR QL SCN: NOT DETECTED
PLATELET # BLD AUTO: 226 10E3/UL (ref 150–450)
PROPOXYPH UR QL: NOT DETECTED
RBC # BLD AUTO: 4.44 10E6/UL (ref 4.4–5.9)
TRICYCLICS UR QL SCN: NOT DETECTED
WBC # BLD AUTO: 9 10E3/UL (ref 4–11)

## 2021-11-01 PROCEDURE — 86140 C-REACTIVE PROTEIN: CPT | Performed by: INTERNAL MEDICINE

## 2021-11-01 PROCEDURE — 84439 ASSAY OF FREE THYROXINE: CPT | Performed by: INTERNAL MEDICINE

## 2021-11-01 PROCEDURE — 99215 OFFICE O/P EST HI 40 MIN: CPT | Performed by: INTERNAL MEDICINE

## 2021-11-01 PROCEDURE — 80050 GENERAL HEALTH PANEL: CPT | Performed by: INTERNAL MEDICINE

## 2021-11-01 PROCEDURE — 36415 COLL VENOUS BLD VENIPUNCTURE: CPT | Performed by: INTERNAL MEDICINE

## 2021-11-01 PROCEDURE — 83036 HEMOGLOBIN GLYCOSYLATED A1C: CPT | Performed by: INTERNAL MEDICINE

## 2021-11-01 PROCEDURE — 80306 DRUG TEST PRSMV INSTRMNT: CPT | Performed by: INTERNAL MEDICINE

## 2021-11-01 PROCEDURE — 82248 BILIRUBIN DIRECT: CPT | Performed by: INTERNAL MEDICINE

## 2021-11-01 PROCEDURE — 85652 RBC SED RATE AUTOMATED: CPT | Performed by: INTERNAL MEDICINE

## 2021-11-01 RX ORDER — DULOXETIN HYDROCHLORIDE 20 MG/1
20 CAPSULE, DELAYED RELEASE ORAL DAILY
Qty: 90 CAPSULE | Refills: 3 | Status: SHIPPED | OUTPATIENT
Start: 2021-11-01 | End: 2022-05-02

## 2021-11-01 RX ORDER — HYDROCODONE BITARTRATE AND ACETAMINOPHEN 5; 325 MG/1; MG/1
1 TABLET ORAL EVERY 6 HOURS PRN
Qty: 180 TABLET | Refills: 0 | Status: SHIPPED | OUTPATIENT
Start: 2021-11-01 | End: 2021-12-15

## 2021-11-01 ASSESSMENT — MIFFLIN-ST. JEOR: SCORE: 1536.87

## 2021-11-01 NOTE — LETTER
[facility]  -- Controlled Medication Agreement    11/1/2021   Harrison Thomas   1945   [id number]       I understand that my provider is prescribing controlled medication (i.e., opioids, tranquilizers, barbiturates) to assist me in managing my chronic pain that has not responded to other treatments.  These medications are intended to decrease pain in order to improve function and/or ability to work.  The risks, benefits, and side effects or these medications have been explained to me and I agree to the following conditions for this type of treatment.    1. I will participate in other treatments (i.e., physical therapy, behavioral therapy, groups,) that my provider recommends.  I will be ready to taper or discontinue medications as other reasonable and effective treatments become available.  I understand I may be expected to see a health psychologist and a physical therapist at the discretion of my physician for ongoing functional assessment.  I will follow through with any recommendations made at this visit.    2. I will take my medications exactly as prescribed and will not change the medication dosage or schedule without my provider s approval.  Refills will not be given if I  run out early .  3. I will keep all regular appointments at the clinic (this includes nurse appointments and appointments with PT or behavioral medicine).  If I have three or more missed or cancelled appointments my medications may be discontinued.  4. I will not request or accept prescriptions for controlled substances from other physicians or individuals for my chronic pain condition.  If I develop another condition that requires the prescription of a controlled medication or if I am hospitalized for any reason, I will inform the clinic within one business day of receiving any treatment or medications.  5. I will designate one pharmacy where all of my prescriptions will be filled.  6. I will bring in the containers  of all medications prescribed each time I see my provider or a nurse even if there is no medication remaining.  This must be the original container from the pharmacy.  7. Refills of controlled medications will be made only during regular office hours, during a scheduled appointment with my provider or nurse.   8. I am responsible for my prescriptions.  If the medication is lost or stolen, I understand it will not be replaced.  9. I agree to abstain from all illegal and recreational drugs and will provide urine or blood specimens to monitor my compliance.  10. I will notify my nurse or provider immediately if I become pregnant.  11. I understand that controlled medications can affect my thinking and judgment and may interfere with my ability to drive.  I will not drive if I have this concern and will not drive if any dosage adjustments are made until my body has adjusted.        I understand that if I violate any of the above conditions my prescription medications and/or treatment may be terminated.  If the violation includes obtaining any controlled substances from other healthcare providers or individuals a report may be made to my physician, pharmacy, and other authorities including the police.    I have read this agreement and it has been explained to me.  I fully understand the consequences of violating this agreement.        _________________                             ___________                _________________       Patient Signature                                Date                              Witness      _________________  aYneli Dozier MD
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
## 2021-11-01 NOTE — Clinical Note
Daphnie Rae,  I hope you are doing well.  Harrison is still on methimazole and I am not understanding if that is a mistake on his part.  Can you please comment? Yaneli

## 2021-11-01 NOTE — Clinical Note
Harrison Tatum is dealing with metastatic thyroid cancer and a lung nodule on right lobe. This is suspicious and he is seeing Westley at [facility].  Harrison plans to see you in December.  SANDY Groves

## 2021-11-01 NOTE — PATIENT INSTRUCTIONS
Stop all supplements 3 days before surgery    Take Metoprolol the AM of surgery , hold all other medications

## 2021-11-02 LAB
ALBUMIN SERPL-MCNC: 3.6 G/DL (ref 3.4–5)
ALP SERPL-CCNC: 107 U/L (ref 40–150)
ALT SERPL W P-5'-P-CCNC: 17 U/L (ref 0–70)
ANION GAP SERPL CALCULATED.3IONS-SCNC: 7 MMOL/L (ref 3–14)
AST SERPL W P-5'-P-CCNC: 15 U/L (ref 0–45)
BILIRUB DIRECT SERPL-MCNC: 0.2 MG/DL (ref 0–0.2)
BILIRUB SERPL-MCNC: 0.8 MG/DL (ref 0.2–1.3)
BUN SERPL-MCNC: 17 MG/DL (ref 7–30)
CALCIUM SERPL-MCNC: 9.3 MG/DL (ref 8.5–10.1)
CALCIUM SERPL-MCNC: 9.3 MG/DL (ref 8.5–10.1)
CHLORIDE BLD-SCNC: 105 MMOL/L (ref 94–109)
CO2 SERPL-SCNC: 27 MMOL/L (ref 20–32)
CREAT SERPL-MCNC: 0.89 MG/DL (ref 0.66–1.25)
CREAT SERPL-MCNC: 0.89 MG/DL (ref 0.66–1.25)
GFR SERPL CREATININE-BSD FRML MDRD: 84 ML/MIN/1.73M2
GFR SERPL CREATININE-BSD FRML MDRD: 84 ML/MIN/1.73M2
GLUCOSE BLD-MCNC: 96 MG/DL (ref 70–99)
POTASSIUM BLD-SCNC: 4.8 MMOL/L (ref 3.4–5.3)
PROT SERPL-MCNC: 7.5 G/DL (ref 6.8–8.8)
SODIUM SERPL-SCNC: 139 MMOL/L (ref 133–144)
T4 FREE SERPL-MCNC: 1.6 NG/DL (ref 0.76–1.46)
TSH SERPL DL<=0.005 MIU/L-ACNC: 1.26 MU/L (ref 0.4–4)

## 2021-11-08 DIAGNOSIS — M62.830 BACK MUSCLE SPASM: ICD-10-CM

## 2021-11-08 DIAGNOSIS — I25.10 CORONARY ARTERY DISEASE INVOLVING NATIVE CORONARY ARTERY OF NATIVE HEART WITHOUT ANGINA PECTORIS: ICD-10-CM

## 2021-11-09 DIAGNOSIS — E89.0 POSTSURGICAL HYPOTHYROIDISM: ICD-10-CM

## 2021-11-09 RX ORDER — LEVOTHYROXINE SODIUM 137 UG/1
137 TABLET ORAL DAILY
Qty: 30 TABLET | Refills: 5 | Status: SHIPPED | OUTPATIENT
Start: 2021-11-09 | End: 2022-04-07

## 2021-11-09 RX ORDER — CARISOPRODOL 250 MG/1
250 TABLET ORAL DAILY PRN
Qty: 31 TABLET | Refills: 3 | Status: ON HOLD | OUTPATIENT
Start: 2021-11-09 | End: 2023-01-14

## 2021-11-09 RX ORDER — NITROGLYCERIN 0.4 MG/1
TABLET SUBLINGUAL
Qty: 25 TABLET | Refills: 3 | Status: SHIPPED | OUTPATIENT
Start: 2021-11-09

## 2021-11-09 NOTE — TELEPHONE ENCOUNTER
Routing refill request to provider for review/approval because:  Drug not on the [facility] refill protocol and  Pt is not on erectile dysfunction medications    Last office vist: 11/1/2021    Pended.  Please Review.    Next office visit: None scheduled    Sybil Hook RN  [facility]

## 2021-11-15 ENCOUNTER — MYC MEDICAL ADVICE (OUTPATIENT)
Dept: FAMILY MEDICINE | Facility: CLINIC | Age: 76
End: 2021-11-15
Payer: COMMERCIAL

## 2021-11-15 DIAGNOSIS — M62.830 BACK MUSCLE SPASM: Primary | ICD-10-CM

## 2021-11-15 RX ORDER — LIDOCAINE 4 G/G
1 PATCH TOPICAL DAILY PRN
Qty: 30 PATCH | Refills: 5 | Status: ON HOLD | OUTPATIENT
Start: 2021-11-15 | End: 2023-01-14

## 2021-11-29 ENCOUNTER — LAB (OUTPATIENT)
Dept: LAB | Facility: CLINIC | Age: 76
End: 2021-11-29
Payer: COMMERCIAL

## 2021-11-29 DIAGNOSIS — C73 PAPILLARY THYROID CARCINOMA (H): ICD-10-CM

## 2021-11-29 DIAGNOSIS — E89.0 POSTSURGICAL HYPOTHYROIDISM: ICD-10-CM

## 2021-11-29 PROCEDURE — 84432 ASSAY OF THYROGLOBULIN: CPT | Mod: 90

## 2021-11-29 PROCEDURE — 99000 SPECIMEN HANDLING OFFICE-LAB: CPT

## 2021-11-29 PROCEDURE — 86800 THYROGLOBULIN ANTIBODY: CPT | Mod: 90

## 2021-11-29 PROCEDURE — 36415 COLL VENOUS BLD VENIPUNCTURE: CPT

## 2021-12-01 ENCOUNTER — OFFICE VISIT (OUTPATIENT)
Dept: RHEUMATOLOGY | Facility: CLINIC | Age: 76
End: 2021-12-01
Payer: COMMERCIAL

## 2021-12-01 VITALS
HEIGHT: 70 IN | SYSTOLIC BLOOD PRESSURE: 134 MMHG | BODY MASS INDEX: 25.05 KG/M2 | DIASTOLIC BLOOD PRESSURE: 67 MMHG | WEIGHT: 175 LBS | HEART RATE: 62 BPM | OXYGEN SATURATION: 95 %

## 2021-12-01 DIAGNOSIS — Z79.899 HIGH RISK MEDICATIONS (NOT ANTICOAGULANTS) LONG-TERM USE: ICD-10-CM

## 2021-12-01 DIAGNOSIS — M85.80 OSTEOPENIA, UNSPECIFIED LOCATION: ICD-10-CM

## 2021-12-01 DIAGNOSIS — Z23 NEED FOR DIPHTHERIA-TETANUS-PERTUSSIS (TDAP) VACCINE: ICD-10-CM

## 2021-12-01 DIAGNOSIS — M05.79 RHEUMATOID ARTHRITIS INVOLVING MULTIPLE SITES WITH POSITIVE RHEUMATOID FACTOR (H): Primary | ICD-10-CM

## 2021-12-01 PROCEDURE — 99214 OFFICE O/P EST MOD 30 MIN: CPT | Mod: 25 | Performed by: INTERNAL MEDICINE

## 2021-12-01 PROCEDURE — 90715 TDAP VACCINE 7 YRS/> IM: CPT | Performed by: INTERNAL MEDICINE

## 2021-12-01 PROCEDURE — 90471 IMMUNIZATION ADMIN: CPT | Performed by: INTERNAL MEDICINE

## 2021-12-01 RX ORDER — ALENDRONATE SODIUM 70 MG/1
70 TABLET ORAL
Qty: 12 TABLET | Refills: 3 | Status: SHIPPED | OUTPATIENT
Start: 2021-12-01 | End: 2021-12-29

## 2021-12-01 RX ORDER — FOLIC ACID 1 MG/1
3 TABLET ORAL DAILY
Qty: 270 TABLET | Refills: 2 | Status: SHIPPED | OUTPATIENT
Start: 2021-12-01 | End: 2022-04-06

## 2021-12-01 ASSESSMENT — MIFFLIN-ST. JEOR: SCORE: 1540.04
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
## 2021-12-01 NOTE — PATIENT INSTRUCTIONS
RHEUMATOLOGY    Dr. Niles Cedillo    [address] [place] [address]  Phone number: [phone]  Fax number: [phone]    Thank you for choosing [facility]!    Kinjal Quinn CMA Rheumatology

## 2021-12-03 ASSESSMENT — ENCOUNTER SYMPTOMS
DISTURBANCES IN COORDINATION: 1
WEAKNESS: 1
MEMORY LOSS: 0
POSTURAL DYSPNEA: 1
JOINT SWELLING: 1
DYSPNEA ON EXERTION: 1
TREMORS: 0
NERVOUS/ANXIOUS: 0
NECK PAIN: 1
NUMBNESS: 1
SWOLLEN GLANDS: 0
SPUTUM PRODUCTION: 0
TINGLING: 1
INSOMNIA: 1
LOSS OF CONSCIOUSNESS: 0
COUGH DISTURBING SLEEP: 0
SHORTNESS OF BREATH: 1
MYALGIAS: 1
DIZZINESS: 1
ARTHRALGIAS: 1
HEMOPTYSIS: 0
SPEECH CHANGE: 1
HEADACHES: 0
BRUISES/BLEEDS EASILY: 1
COUGH: 0
DEPRESSION: 1
MUSCLE WEAKNESS: 1
BACK PAIN: 1
PARALYSIS: 0
PANIC: 0
WHEEZING: 1
SNORES LOUDLY: 1
MUSCLE CRAMPS: 0
SEIZURES: 0
STIFFNESS: 1
DECREASED CONCENTRATION: 0

## 2021-12-07 ENCOUNTER — TELEPHONE (OUTPATIENT)
Dept: RHEUMATOLOGY | Facility: CLINIC | Age: 76
End: 2021-12-07
Payer: COMMERCIAL

## 2021-12-07 NOTE — TELEPHONE ENCOUNTER
Pt had second thought and chronically he has complaints of LE pain and Hips. He thought Dr Cedillo was talking about just his hands and wrists. States his gait is abn at this time when walking or doing ADL's pain is horrible. Please advise if imaging, change in med, or return to in clinic.     Taylor MCKENNA RN Specialty Triage 12/7/2021 3:00 PM

## 2021-12-08 NOTE — TELEPHONE ENCOUNTER
RN: Please call to notify Harrison William that his symptoms are degenerative in nature.  Symptoms started well after being on both Fosamax and methotrexate.  It is unlikely that Fosamax or methotrexate are contributing to his bilateral leg pain.  This is something that needs to be evaluated in clinic.  Okay to schedule a sooner appointment for evaluation in clinic, but do not overbook.    Niles Cedillo MD  12/8/2021 2:54 PM

## 2021-12-08 NOTE — TELEPHONE ENCOUNTER
Called patient and discussed Dr. Cedillo's message below. Patient stated he feels as though his bilateral side of leg muscle pain is from either methotrexate or fosamax. The pain started about 5-6 months ago. Upon chart review patient started alendronate in 4/2020 and has been taking methotrexate since before 2019. His pain is worse with movement. The more he walks, the worse the pain gets. He rates pain at a 7/10 with movement. Sitting down at rest provides some relief. He takes hydrocodone for his back and it does not improve pain. Denies redness, swelling, or stiffness. Patient would like to know if this pain is from one of the medications he is taking and if so, if there is an alternative he can try. Forwarded to Dr. Cedillo for review.    (harjinder gilman)    Mert CASANOVA RN....12/8/2021 11:56 AM

## 2021-12-08 NOTE — TELEPHONE ENCOUNTER
RN: Thank you for the note.  Harrison Thomas has degenerative changes of the lumbar spine, and history of left total hip arthroplasty.  We may discuss his symptoms at his next appointment.  He may also discuss the chronic low back pain and hip pain with his PCP and/or surgeon.     Niles Cedillo MD  12/7/2021 10:27 PM

## 2021-12-08 NOTE — TELEPHONE ENCOUNTER
Left message for patient to return call to clinic.    Mert Marmolejo RN....12/8/2021 11:10 AM

## 2021-12-09 NOTE — TELEPHONE ENCOUNTER
Called and spoke with patient regarding Dr. Cedillo's recommendations below. Patient stated he would like to try holding off on both medications for a few weeks to see if his symptoms improve. He was advised that there is the potential for his RA to flare during this time and he verbalized understanding.    Mert CASANOVA RN....12/9/2021 1:49 PM

## 2021-12-10 ENCOUNTER — TELEPHONE (OUTPATIENT)
Dept: PULMONOLOGY | Facility: CLINIC | Age: 76
End: 2021-12-10

## 2021-12-10 ENCOUNTER — OFFICE VISIT (OUTPATIENT)
Dept: PULMONOLOGY | Facility: CLINIC | Age: 76
End: 2021-12-10
Attending: INTERNAL MEDICINE
Payer: COMMERCIAL

## 2021-12-10 VITALS — SYSTOLIC BLOOD PRESSURE: 115 MMHG | HEART RATE: 61 BPM | DIASTOLIC BLOOD PRESSURE: 69 MMHG | OXYGEN SATURATION: 93 %

## 2021-12-10 DIAGNOSIS — R91.1 LUNG NODULE: ICD-10-CM

## 2021-12-10 DIAGNOSIS — J42 CHRONIC BRONCHITIS, UNSPECIFIED CHRONIC BRONCHITIS TYPE (H): Primary | ICD-10-CM

## 2021-12-10 DIAGNOSIS — J43.8 OTHER EMPHYSEMA (H): ICD-10-CM

## 2021-12-10 LAB — SCANNED LAB RESULT: ABNORMAL

## 2021-12-10 PROCEDURE — 94375 RESPIRATORY FLOW VOLUME LOOP: CPT | Performed by: INTERNAL MEDICINE

## 2021-12-10 PROCEDURE — 99215 OFFICE O/P EST HI 40 MIN: CPT | Mod: 25 | Performed by: INTERNAL MEDICINE

## 2021-12-10 PROCEDURE — 94729 DIFFUSING CAPACITY: CPT | Performed by: INTERNAL MEDICINE

## 2021-12-10 PROCEDURE — G0463 HOSPITAL OUTPT CLINIC VISIT: HCPCS

## 2021-12-10 NOTE — TELEPHONE ENCOUNTER
Faxed oxygen discontinue order to [employer] temporarily as patient's insurance will be changing. He will need to be requalified for oxygen when he obtains new insurance. He can't afford oxygen now during this time.
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
## 2021-12-10 NOTE — PATIENT INSTRUCTIONS
PFTs today if possible here or subsequently at [facility]  Scheduled to see Dr. Dumont 12/15 already - spoke with him and believe potential benefits of surgery outweigh risk of worsened lung function  Temporarily discontinue home oxygen for insurance reasons  Plan to return to see me in 4-6 months  For problems in interim, please call Brandon Early [phone]
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
## 2021-12-10 NOTE — NURSING NOTE
Chief Complaint   Patient presents with     General Visit     4mo f/u     Vitals were taken and medications were reconciled.     VIRAL Whittington

## 2021-12-11 LAB
DLCOUNC-%PRED-PRE: 46 %
DLCOUNC-PRE: 11.46 ML/MIN/MMHG
DLCOUNC-PRED: 24.52 ML/MIN/MMHG
ERV-%PRED-PRE: 42 %
ERV-PRE: 0.44 L
ERV-PRED: 1.03 L
EXPTIME-PRE: 12.87 SEC
FEF2575-%PRED-PRE: 22 %
FEF2575-PRE: 0.5 L/SEC
FEF2575-PRED: 2.22 L/SEC
FEFMAX-%PRED-PRE: 59 %
FEFMAX-PRE: 4.63 L/SEC
FEFMAX-PRED: 7.72 L/SEC
FEV1-%PRED-PRE: 44 %
FEV1-PRE: 1.33 L
FEV1FEV6-PRE: 60 %
FEV1FEV6-PRED: 77 %
FEV1FVC-PRE: 55 %
FEV1FVC-PRED: 76 %
FEV1SVC-PRE: 53 %
FEV1SVC-PRED: 66 %
FIFMAX-PRE: 3.45 L/SEC
FVC-%PRED-PRE: 61 %
FVC-PRE: 2.44 L
FVC-PRED: 3.96 L
IC-%PRED-PRE: 59 %
IC-PRE: 2.06 L
IC-PRED: 3.46 L
VA-%PRED-PRE: 60 %
VA-PRE: 3.73 L
VC-%PRED-PRE: 55 %
VC-PRE: 2.5 L
VC-PRED: 4.49 L

## 2021-12-14 ENCOUNTER — HOSPITAL ENCOUNTER (OUTPATIENT)
Dept: CARDIOLOGY | Facility: CLINIC | Age: 76
Discharge: HOME | End: 2021-12-14
Payer: COMMERCIAL

## 2021-12-14 ENCOUNTER — TELEPHONE (OUTPATIENT)
Dept: CARDIOLOGY | Facility: CLINIC | Age: 76
End: 2021-12-14

## 2021-12-14 DIAGNOSIS — I34.0 MITRAL VALVE INSUFFICIENCY, UNSPECIFIED ETIOLOGY: ICD-10-CM

## 2021-12-14 LAB — LVEF ECHO: NORMAL

## 2021-12-14 PROCEDURE — 93306 TTE W/DOPPLER COMPLETE: CPT

## 2021-12-14 PROCEDURE — 93306 TTE W/DOPPLER COMPLETE: CPT | Mod: 26 | Performed by: INTERNAL MEDICINE

## 2021-12-14 NOTE — TELEPHONE ENCOUNTER
Spoke with Harrison regarding the results from his Echo today.  He has known mitral valve insufficiency and is followed by Dr. Fatima for more than 10 years, but last seen by Natalie Fisher in June 2020.      Writer informed Harrison of the following results from Echocardiogram:    Left ventricular systolic function is normal.  The visual ejection fraction is 65-70%.  No regional wall motion abnormalities noted.  The right ventricle is normal in size and function.  The mitral valve leaflets appear normal.  Mild mitral valve regurgitation.  Normal left atrial size.   No significant changes compared to previous study dated 12/13/2017.    Harrison was relieved to hear this, and is questioning if he needs to keep the follow up appointment that is scheduled with Dr. Fatima for January 21, 2022.  (He does have a lot of other MD appointments with many specialists involved.)    Writer has reached out to Dr. Fatima's team for clarification, and I told Harrison that I would be contacting him later today with the answer.    Currently there are no future appointments scheduled with any cardiology team member, past this January 21, 2022 appointment.   Will await to hear response from team 6.

## 2021-12-14 NOTE — TELEPHONE ENCOUNTER
Spoke with Harrison after receiving information requested from Dr. Fatiam's team.    Patient does need to keep his appointment in January 2022, since it has been 18 months since he was last seen.    Harrison verbalized understanding and agreement that he will come in for his scheduled appointmentl.

## 2021-12-15 ENCOUNTER — TRANSFERRED RECORDS (OUTPATIENT)
Dept: HEALTH INFORMATION MANAGEMENT | Facility: CLINIC | Age: 76
End: 2021-12-15
Payer: COMMERCIAL

## 2021-12-15 DIAGNOSIS — M48.062 SPINAL STENOSIS OF LUMBAR REGION WITH NEUROGENIC CLAUDICATION: ICD-10-CM

## 2021-12-16 RX ORDER — HYDROCODONE BITARTRATE AND ACETAMINOPHEN 5; 325 MG/1; MG/1
1 TABLET ORAL EVERY 6 HOURS PRN
Qty: 180 TABLET | Refills: 0 | Status: SHIPPED | OUTPATIENT
Start: 2021-12-16 | End: 2022-01-18

## 2021-12-24 ENCOUNTER — TELEPHONE (OUTPATIENT)
Dept: FAMILY MEDICINE | Facility: CLINIC | Age: 76
End: 2021-12-24
Payer: COMMERCIAL

## 2021-12-24 DIAGNOSIS — Z11.59 ENCOUNTER FOR SCREENING FOR OTHER VIRAL DISEASES: ICD-10-CM

## 2021-12-24 NOTE — TELEPHONE ENCOUNTER
Reason for Call:  Other appointment    Detailed comments: Patient would like to be fit in to see you in the month of December for a pre op physical- for upcoming lung surgery on 1-. Patient states he might not have insurance in 2022 to cover this. Please call..    Phone Number Patient can be reached at: Cell number on file:    Telephone Information:   Mobile [phone]       Best Time: any    Can we leave a detailed message on this number? YES    Call taken on 12/24/2021 at 10:29 AM by Nataliia Miller

## 2021-12-27 NOTE — TELEPHONE ENCOUNTER
I scheduled patient for pre-op with Dr. Posadas on 12/29/2021, since he has no insurance after 12/31/2021.   Bita Hernandez MA

## 2021-12-27 NOTE — TELEPHONE ENCOUNTER
Not possible for me because I have my home surgery coming up and I can schedule in January if he wishes

## 2021-12-29 ENCOUNTER — OFFICE VISIT (OUTPATIENT)
Dept: FAMILY MEDICINE | Facility: CLINIC | Age: 76
End: 2021-12-29
Payer: COMMERCIAL

## 2021-12-29 VITALS
DIASTOLIC BLOOD PRESSURE: 70 MMHG | RESPIRATION RATE: 18 BRPM | WEIGHT: 175 LBS | SYSTOLIC BLOOD PRESSURE: 130 MMHG | HEIGHT: 70 IN | TEMPERATURE: 96.8 F | HEART RATE: 65 BPM | OXYGEN SATURATION: 96 % | BODY MASS INDEX: 25.05 KG/M2

## 2021-12-29 DIAGNOSIS — Z01.818 PREOPERATIVE EXAMINATION: Primary | ICD-10-CM

## 2021-12-29 DIAGNOSIS — I10 ESSENTIAL HYPERTENSION, BENIGN: ICD-10-CM

## 2021-12-29 DIAGNOSIS — G47.33 OSA (OBSTRUCTIVE SLEEP APNEA): ICD-10-CM

## 2021-12-29 DIAGNOSIS — M05.79 RHEUMATOID ARTHRITIS INVOLVING MULTIPLE SITES WITH POSITIVE RHEUMATOID FACTOR (H): ICD-10-CM

## 2021-12-29 DIAGNOSIS — C73 METASTASIS FROM THYROID CANCER (H): ICD-10-CM

## 2021-12-29 DIAGNOSIS — J42 CHRONIC BRONCHITIS, UNSPECIFIED CHRONIC BRONCHITIS TYPE (H): ICD-10-CM

## 2021-12-29 DIAGNOSIS — R91.1 NODULE OF UPPER LOBE OF RIGHT LUNG: ICD-10-CM

## 2021-12-29 DIAGNOSIS — C79.9 METASTASIS FROM THYROID CANCER (H): ICD-10-CM

## 2021-12-29 DIAGNOSIS — I25.10 CORONARY ARTERY DISEASE INVOLVING NATIVE CORONARY ARTERY OF NATIVE HEART WITHOUT ANGINA PECTORIS: ICD-10-CM

## 2021-12-29 LAB
ERYTHROCYTE [DISTWIDTH] IN BLOOD BY AUTOMATED COUNT: 14.4 % (ref 10–15)
HCT VFR BLD AUTO: 44.4 % (ref 40–53)
HGB BLD-MCNC: 14.6 G/DL (ref 13.3–17.7)
MCH RBC QN AUTO: 31 PG (ref 26.5–33)
MCHC RBC AUTO-ENTMCNC: 32.9 G/DL (ref 31.5–36.5)
MCV RBC AUTO: 94 FL (ref 78–100)
PLATELET # BLD AUTO: 187 10E3/UL (ref 150–450)
RBC # BLD AUTO: 4.71 10E6/UL (ref 4.4–5.9)
WBC # BLD AUTO: 8.7 10E3/UL (ref 4–11)

## 2021-12-29 PROCEDURE — 99215 OFFICE O/P EST HI 40 MIN: CPT | Performed by: INTERNAL MEDICINE

## 2021-12-29 PROCEDURE — 80048 BASIC METABOLIC PNL TOTAL CA: CPT | Performed by: INTERNAL MEDICINE

## 2021-12-29 PROCEDURE — 85027 COMPLETE CBC AUTOMATED: CPT | Performed by: INTERNAL MEDICINE

## 2021-12-29 PROCEDURE — 93000 ELECTROCARDIOGRAM COMPLETE: CPT | Performed by: INTERNAL MEDICINE

## 2021-12-29 PROCEDURE — 36415 COLL VENOUS BLD VENIPUNCTURE: CPT | Performed by: INTERNAL MEDICINE

## 2021-12-29 ASSESSMENT — ENCOUNTER SYMPTOMS
FEVER: 0
PALPITATIONS: 0
CHILLS: 0
SHORTNESS OF BREATH: 0

## 2021-12-29 ASSESSMENT — PAIN SCALES - GENERAL: PAINLEVEL: NO PAIN (0)

## 2021-12-29 ASSESSMENT — MIFFLIN-ST. JEOR: SCORE: 1540.04
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
## 2021-12-29 NOTE — PATIENT INSTRUCTIONS
Avoid aspirin 7 days before the surgery. Avoid nonsteroidal anti-inflammatory pain medication like ibuprofen, Motrin, or Aleve 7 days before the surgery.  Tylenol can be used for pain.  Avoid any over the counter multivitamins or herbal supplement 7 days before surgery   You can resume these medications after surgery    Hold Alendronate on the day of procedure    Hold Lasix on the day of the surgery     Hold Sildenafil 1 day before the procedure    Continue the rest of medications as prescribed.

## 2021-12-30 LAB
ANION GAP SERPL CALCULATED.3IONS-SCNC: 5 MMOL/L (ref 3–14)
BUN SERPL-MCNC: 20 MG/DL (ref 7–30)
CALCIUM SERPL-MCNC: 9.5 MG/DL (ref 8.5–10.1)
CHLORIDE BLD-SCNC: 104 MMOL/L (ref 94–109)
CO2 SERPL-SCNC: 32 MMOL/L (ref 20–32)
CREAT SERPL-MCNC: 1.03 MG/DL (ref 0.66–1.25)
GFR SERPL CREATININE-BSD FRML MDRD: 76 ML/MIN/1.73M2
GLUCOSE BLD-MCNC: 86 MG/DL (ref 70–99)
POTASSIUM BLD-SCNC: 4.5 MMOL/L (ref 3.4–5.3)
SODIUM SERPL-SCNC: 141 MMOL/L (ref 133–144)

## 2022-01-04 DIAGNOSIS — J44.1 CHRONIC OBSTRUCTIVE PULMONARY DISEASE WITH (ACUTE) EXACERBATION (H): ICD-10-CM

## 2022-01-05 RX ORDER — ALBUTEROL SULFATE 90 UG/1
AEROSOL, METERED RESPIRATORY (INHALATION)
Qty: 18 G | Refills: 3 | Status: SHIPPED | OUTPATIENT
Start: 2022-01-05

## 2022-01-05 NOTE — TELEPHONE ENCOUNTER
Last OV 12/29/21 - Dr Posadas     Last visit with PCP 11/1/21     Routing refill request to provider for review/approval because:  Fails protocol due to no ACT on file, sent to patient via Quinn Perez RN  [facility]

## 2022-01-17 ENCOUNTER — LAB (OUTPATIENT)
Dept: URGENT CARE | Facility: URGENT CARE | Age: 77
End: 2022-01-17
Attending: THORACIC SURGERY (CARDIOTHORACIC VASCULAR SURGERY)
Payer: MEDICARE

## 2022-01-17 DIAGNOSIS — Z11.59 ENCOUNTER FOR SCREENING FOR OTHER VIRAL DISEASES: ICD-10-CM

## 2022-01-17 PROCEDURE — U0005 INFEC AGEN DETEC AMPLI PROBE: HCPCS

## 2022-01-17 PROCEDURE — U0003 INFECTIOUS AGENT DETECTION BY NUCLEIC ACID (DNA OR RNA); SEVERE ACUTE RESPIRATORY SYNDROME CORONAVIRUS 2 (SARS-COV-2) (CORONAVIRUS DISEASE [COVID-19]), AMPLIFIED PROBE TECHNIQUE, MAKING USE OF HIGH THROUGHPUT TECHNOLOGIES AS DESCRIBED BY CMS-2020-01-R: HCPCS

## 2022-01-18 DIAGNOSIS — M48.062 SPINAL STENOSIS OF LUMBAR REGION WITH NEUROGENIC CLAUDICATION: ICD-10-CM

## 2022-01-18 LAB — SARS-COV-2 RNA RESP QL NAA+PROBE: NEGATIVE

## 2022-01-18 RX ORDER — HYDROCODONE BITARTRATE AND ACETAMINOPHEN 5; 325 MG/1; MG/1
1 TABLET ORAL EVERY 6 HOURS PRN
Qty: 180 TABLET | Refills: 0 | Status: ON HOLD | OUTPATIENT
Start: 2022-01-18 | End: 2022-01-21

## 2022-01-18 NOTE — TELEPHONE ENCOUNTER
HYDROcodone-acetaminophen (NORCO) 5-325 MG tablet 180 tablet 0 12/16/2021  No   Sig - Route: Take 1 tablet by mouth every 6 hours as needed for severe pain - Oral   Sent to pharmacy as: HYDROcodone-Acetaminophen 5-325 MG Oral Tablet (NORCO)   Class: E-Prescribe   Earliest Fill Date: 12/16/2021   Order: [id number]   E-Prescribing Status: Receipt confirmed by pharmacy (12/16/2021  7:36 AM CST)   Prior authorization: Closed - Prior Authorization not required for patient/medication     Last OV 12/29/21     Routing refill request to provider for review/approval because:  Drug not on the [facility] refill protocol     Quinn Galvan RN  [facility]

## 2022-01-20 ENCOUNTER — ANESTHESIA EVENT (OUTPATIENT)
Dept: SURGERY | Facility: CLINIC | Age: 77
End: 2022-01-20
Payer: MEDICARE

## 2022-01-20 NOTE — PROGRESS NOTES
PTA medications updated by Medication Scribe prior to surgery via phone call with patient (last doses completed by Nurse)     Medication history sources: Patient  In the past week, patient estimated taking medication this percent of the time: Greater than 90%  Adherence assessment: Moderate    Significant changes made to the medication list:  Patient taking meds differently than prescribed; See PTA entries for:  Calcium carbonate 600mg Q12h      Additional medication history information:   Patient brought own home meds:  Trelegy Ellipta    Medication reconciliation completed by provider prior to medication history? No    Time spent in this activity: 30 mins    The information provided in this note is only as accurate as the sources available at the time of update(s)      Prior to Admission medications    Medication Sig Last Dose Taking? Auth Provider   acetaminophen (TYLENOL) 325 MG tablet Take 2 tablets (650 mg) by mouth every 4 hours as needed for other (For optimal non-opioid multimodal pain management to improve pain control.) over a month at prn Yes Mariano Zarco MD   albuterol (PROAIR HFA/PROVENTIL HFA/VENTOLIN HFA) 108 (90 Base) MCG/ACT inhaler INHALE 2 PUFFS BY MOUTH EVERY 6 HOURS AS NEEDED FOR WHEEZE OR FOR SHORTNESS OF BREATH 1/17/2022 at prn Yes Yaneli Dozier MD   amLODIPine (NORVASC) 5 MG tablet Take 1 tablet (5 mg) by mouth At Bedtime 1/20/2022 at pm Yes Yaneli Dozier MD   Ascorbic Acid (VITAMIN C PO) Take 1 tablet by mouth daily 1/13/2022 at am Yes Reported, Patient   aspirin 81 MG tablet Take 1 tablet by mouth 2 times daily  1/13/2022 Yes Reported, Patient   calcium carbonate (OS-KARLIE) 600 MG tablet Take 2 tablets (1,200 mg) by mouth every 12 hours  Patient taking differently: Take 600 mg by mouth every 12 hours  1/13/2022 Yes Mariano Zarco MD   Carisoprodol 250 MG TABS Take 250 mg by mouth daily as needed (spasms) 1/13/2022 Yes Yaneli Dozier MD   cholecalciferol 25 MCG (1000 UT) TABS Take  1,000 Units by mouth daily  1/13/2022 Yes Reported, Patient   DULoxetine (CYMBALTA) 20 MG capsule Take 1 capsule (20 mg) by mouth daily over a month Yes Yaneli Dozier MD   FISH OIL 1000 MG OR CAPS Take 1 g by mouth daily  1/13/2022 Yes Reported, Patient   Fluticasone-Umeclidin-Vilanterol (TRELEGY ELLIPTA) 100-62.5-25 MCG/INH oral inhaler Inhale 1 puff into the lungs daily 1/20/2022 at am Yes Yaneli Dozier MD   folic acid (FOLVITE) 1 MG tablet Take 3 tablets (3 mg) by mouth daily 1/13/2022 at am Yes Niles Cedillo MD   furosemide (LASIX) 20 MG tablet Take 1 tablet (20 mg) by mouth 2 times daily 1/20/2022 at am Yes Yaneli Dozier MD   guaiFENesin (MUCINEX) 600 MG 12 hr tablet Take 600 mg by mouth 2 times daily as needed for congestion  over a month at prn Yes Reported, Patient   HYDROcodone-acetaminophen (NORCO) 5-325 MG tablet Take 1 tablet by mouth every 6 hours as needed for severe pain 1/16/2022 at prn Yes Yaneli Dozier MD   levalbuterol (XOPENEX) 0.31 MG/3ML neb solution INHALE 1 VIAL (3ML) BY NEBULIZATION EVERY 4 HOURS AS NEEDED FOR WHEEZING OR SHORTNESS OF BREATH.  Patient taking differently: Take 1 ampule by nebulization every 4 hours as needed INHALE 1 VIAL (3ML) BY NEBULIZATION EVERY 4 HOURS AS NEEDED FOR WHEEZING OR SHORTNESS OF BREATH. 1/6/2022 at prn Yes Yaneli Dozier MD   levothyroxine (SYNTHROID/LEVOTHROID) 137 MCG tablet Take 1 tablet (137 mcg) by mouth daily , starting on 9/23/21 1/20/2022 at am Yes Demarcus Simmons MD   Lidocaine (LIDOCARE) 4 % Patch Place 1 patch onto the skin daily as needed for moderate pain To prevent lidocaine toxicity, patient should be patch free for 12 hrs daily. For low back pain over a month at prn Yes Vidya Hall PA-C   lisinopril (ZESTRIL) 40 MG tablet TAKE 1 TABLET BY MOUTH EVERY DAY  Patient taking differently: Take 40 mg by mouth daily  1/20/2022 at pm Yes Yaneli Dozier MD   metoprolol succinate ER (TOPROL-XL) 50 MG 24 hr tablet Take 50 mg  by mouth every morning 1/20/2022 at am Yes Reported, Patient   multivitamin w/minerals (MULTIVITAMIN, THERAPEUTIC WITH MINERALS) tablet Take 1 tablet by mouth daily  1/13/2022 Yes Reported, Patient   naloxone (NARCAN) 4 MG/0.1ML nasal spray Spray 1 spray (4 mg) into one nostril alternating nostrils once as needed for opioid reversal every 2-3 minutes until assistance arrives  Yes Yaneli Dozier MD   nitroGLYcerin (NITROSTAT) 0.4 MG sublingual tablet FOR CHEST PAIN PLACE 1 TAB UNDER TONGUE EVERY 5 MIN FOR 3 DOSES. IF SYMPTOMS PERSIST 5 MIN AFTER 1ST DOSE CALL 911  Yes Yaneli Dozier MD   rosuvastatin (CRESTOR) 10 MG tablet Take 10 mg by mouth every evening 1/20/2022 at hs Yes Reported, Patient   senna-docusate (SENOKOT-S/PERICOLACE) 8.6-50 MG tablet Take 1 tablet by mouth 2 times daily 1/13/2022 at prn Yes Mariano Zarco MD   sildenafil (VIAGRA) 100 MG tablet Take 100 mg by mouth daily as needed  over a month at prn Yes Jerry Horvath MD

## 2022-01-21 ENCOUNTER — HOSPITAL ENCOUNTER (OUTPATIENT)
Facility: CLINIC | Age: 77
Discharge: HOME | End: 2022-01-21
Attending: THORACIC SURGERY (CARDIOTHORACIC VASCULAR SURGERY) | Admitting: THORACIC SURGERY (CARDIOTHORACIC VASCULAR SURGERY)
Payer: MEDICARE

## 2022-01-21 ENCOUNTER — ANESTHESIA (OUTPATIENT)
Dept: SURGERY | Facility: CLINIC | Age: 77
End: 2022-01-21
Payer: MEDICARE

## 2022-01-21 ENCOUNTER — APPOINTMENT (OUTPATIENT)
Dept: GENERAL RADIOLOGY | Facility: CLINIC | Age: 77
End: 2022-01-21
Attending: THORACIC SURGERY (CARDIOTHORACIC VASCULAR SURGERY)
Payer: MEDICARE

## 2022-01-21 VITALS
DIASTOLIC BLOOD PRESSURE: 88 MMHG | TEMPERATURE: 97.6 F | HEART RATE: 57 BPM | WEIGHT: 177 LBS | HEIGHT: 69 IN | RESPIRATION RATE: 16 BRPM | OXYGEN SATURATION: 93 % | BODY MASS INDEX: 26.22 KG/M2 | SYSTOLIC BLOOD PRESSURE: 115 MMHG

## 2022-01-21 DIAGNOSIS — R91.1 NODULE OF UPPER LOBE OF RIGHT LUNG: Primary | ICD-10-CM

## 2022-01-21 LAB
ANION GAP SERPL CALCULATED.3IONS-SCNC: 6 MMOL/L (ref 3–14)
BUN SERPL-MCNC: 16 MG/DL (ref 7–30)
CALCIUM SERPL-MCNC: 9.1 MG/DL (ref 8.5–10.1)
CHLORIDE BLD-SCNC: 104 MMOL/L (ref 94–109)
CO2 SERPL-SCNC: 27 MMOL/L (ref 20–32)
CREAT SERPL-MCNC: 0.81 MG/DL (ref 0.66–1.25)
ERYTHROCYTE [DISTWIDTH] IN BLOOD BY AUTOMATED COUNT: 13.2 % (ref 10–15)
GFR SERPL CREATININE-BSD FRML MDRD: >90 ML/MIN/1.73M2
GLUCOSE BLD-MCNC: 102 MG/DL (ref 70–99)
HCT VFR BLD AUTO: 36.7 % (ref 40–53)
HGB BLD-MCNC: 11.8 G/DL (ref 13.3–17.7)
HOLD SPECIMEN: NORMAL
INR PPP: 1.18 (ref 0.85–1.15)
MCH RBC QN AUTO: 29.7 PG (ref 26.5–33)
MCHC RBC AUTO-ENTMCNC: 32.2 G/DL (ref 31.5–36.5)
MCV RBC AUTO: 92 FL (ref 78–100)
PLATELET # BLD AUTO: 227 10E3/UL (ref 150–450)
POTASSIUM BLD-SCNC: 4 MMOL/L (ref 3.4–5.3)
RBC # BLD AUTO: 3.97 10E6/UL (ref 4.4–5.9)
SODIUM SERPL-SCNC: 137 MMOL/L (ref 133–144)
WBC # BLD AUTO: 10.2 10E3/UL (ref 4–11)

## 2022-01-21 PROCEDURE — 710N000012 HC RECOVERY PHASE 2, PER MINUTE: Performed by: THORACIC SURGERY (CARDIOTHORACIC VASCULAR SURGERY)

## 2022-01-21 PROCEDURE — 250N000011 HC RX IP 250 OP 636: Performed by: NURSE ANESTHETIST, CERTIFIED REGISTERED

## 2022-01-21 PROCEDURE — 250N000011 HC RX IP 250 OP 636: Performed by: THORACIC SURGERY (CARDIOTHORACIC VASCULAR SURGERY)

## 2022-01-21 PROCEDURE — 999N000063 XR CHEST PORT 1 VIEW

## 2022-01-21 PROCEDURE — 710N000009 HC RECOVERY PHASE 1, LEVEL 1, PER MIN: Performed by: THORACIC SURGERY (CARDIOTHORACIC VASCULAR SURGERY)

## 2022-01-21 PROCEDURE — 250N000013 HC RX MED GY IP 250 OP 250 PS 637: Performed by: NURSE ANESTHETIST, CERTIFIED REGISTERED

## 2022-01-21 PROCEDURE — 80048 BASIC METABOLIC PNL TOTAL CA: CPT | Performed by: THORACIC SURGERY (CARDIOTHORACIC VASCULAR SURGERY)

## 2022-01-21 PROCEDURE — 250N000009 HC RX 250: Performed by: THORACIC SURGERY (CARDIOTHORACIC VASCULAR SURGERY)

## 2022-01-21 PROCEDURE — 250N000009 HC RX 250: Performed by: NURSE ANESTHETIST, CERTIFIED REGISTERED

## 2022-01-21 PROCEDURE — 272N000001 HC OR GENERAL SUPPLY STERILE: Performed by: THORACIC SURGERY (CARDIOTHORACIC VASCULAR SURGERY)

## 2022-01-21 PROCEDURE — 360N000077 HC SURGERY LEVEL 4, PER MIN: Performed by: THORACIC SURGERY (CARDIOTHORACIC VASCULAR SURGERY)

## 2022-01-21 PROCEDURE — 85610 PROTHROMBIN TIME: CPT | Performed by: THORACIC SURGERY (CARDIOTHORACIC VASCULAR SURGERY)

## 2022-01-21 PROCEDURE — 85027 COMPLETE CBC AUTOMATED: CPT | Performed by: THORACIC SURGERY (CARDIOTHORACIC VASCULAR SURGERY)

## 2022-01-21 PROCEDURE — 999N000141 HC STATISTIC PRE-PROCEDURE NURSING ASSESSMENT: Performed by: THORACIC SURGERY (CARDIOTHORACIC VASCULAR SURGERY)

## 2022-01-21 PROCEDURE — 250N000012 HC RX MED GY IP 250 OP 636 PS 637: Performed by: ANESTHESIOLOGY

## 2022-01-21 PROCEDURE — 258N000003 HC RX IP 258 OP 636: Performed by: ANESTHESIOLOGY

## 2022-01-21 PROCEDURE — 250N000025 HC SEVOFLURANE, PER MIN: Performed by: THORACIC SURGERY (CARDIOTHORACIC VASCULAR SURGERY)

## 2022-01-21 PROCEDURE — 370N000017 HC ANESTHESIA TECHNICAL FEE, PER MIN: Performed by: THORACIC SURGERY (CARDIOTHORACIC VASCULAR SURGERY)

## 2022-01-21 RX ORDER — HYDROCODONE BITARTRATE AND ACETAMINOPHEN 5; 325 MG/1; MG/1
1 TABLET ORAL
Status: DISCONTINUED | OUTPATIENT
Start: 2022-01-21 | End: 2022-01-21 | Stop reason: HOSPADM

## 2022-01-21 RX ORDER — SODIUM CHLORIDE, SODIUM LACTATE, POTASSIUM CHLORIDE, CALCIUM CHLORIDE 600; 310; 30; 20 MG/100ML; MG/100ML; MG/100ML; MG/100ML
INJECTION, SOLUTION INTRAVENOUS CONTINUOUS
Status: DISCONTINUED | OUTPATIENT
Start: 2022-01-21 | End: 2022-01-21 | Stop reason: HOSPADM

## 2022-01-21 RX ORDER — ONDANSETRON 2 MG/ML
4 INJECTION INTRAMUSCULAR; INTRAVENOUS EVERY 30 MIN PRN
Status: DISCONTINUED | OUTPATIENT
Start: 2022-01-21 | End: 2022-01-21 | Stop reason: HOSPADM

## 2022-01-21 RX ORDER — KETAMINE HYDROCHLORIDE 10 MG/ML
INJECTION INTRAMUSCULAR; INTRAVENOUS PRN
Status: DISCONTINUED | OUTPATIENT
Start: 2022-01-21 | End: 2022-01-21

## 2022-01-21 RX ORDER — ACETAMINOPHEN 325 MG/1
650 TABLET ORAL
Status: DISCONTINUED | OUTPATIENT
Start: 2022-01-21 | End: 2022-01-21 | Stop reason: HOSPADM

## 2022-01-21 RX ORDER — CEFAZOLIN SODIUM 2 G/100ML
2 INJECTION, SOLUTION INTRAVENOUS SEE ADMIN INSTRUCTIONS
Status: DISCONTINUED | OUTPATIENT
Start: 2022-01-21 | End: 2022-01-21 | Stop reason: HOSPADM

## 2022-01-21 RX ORDER — DEXAMETHASONE SODIUM PHOSPHATE 4 MG/ML
INJECTION, SOLUTION INTRA-ARTICULAR; INTRALESIONAL; INTRAMUSCULAR; INTRAVENOUS; SOFT TISSUE PRN
Status: DISCONTINUED | OUTPATIENT
Start: 2022-01-21 | End: 2022-01-21

## 2022-01-21 RX ORDER — FENTANYL CITRATE 50 UG/ML
INJECTION, SOLUTION INTRAMUSCULAR; INTRAVENOUS PRN
Status: DISCONTINUED | OUTPATIENT
Start: 2022-01-21 | End: 2022-01-21

## 2022-01-21 RX ORDER — CEFAZOLIN SODIUM 2 G/100ML
2 INJECTION, SOLUTION INTRAVENOUS
Status: COMPLETED | OUTPATIENT
Start: 2022-01-21 | End: 2022-01-21

## 2022-01-21 RX ORDER — FENTANYL CITRATE 0.05 MG/ML
25 INJECTION, SOLUTION INTRAMUSCULAR; INTRAVENOUS EVERY 5 MIN PRN
Status: DISCONTINUED | OUTPATIENT
Start: 2022-01-21 | End: 2022-01-21 | Stop reason: HOSPADM

## 2022-01-21 RX ORDER — APREPITANT 40 MG/1
40 CAPSULE ORAL ONCE
Status: COMPLETED | OUTPATIENT
Start: 2022-01-21 | End: 2022-01-21

## 2022-01-21 RX ORDER — HYDROMORPHONE HCL IN WATER/PF 6 MG/30 ML
0.2 PATIENT CONTROLLED ANALGESIA SYRINGE INTRAVENOUS EVERY 5 MIN PRN
Status: DISCONTINUED | OUTPATIENT
Start: 2022-01-21 | End: 2022-01-21 | Stop reason: HOSPADM

## 2022-01-21 RX ORDER — HYDROCODONE BITARTRATE AND ACETAMINOPHEN 5; 325 MG/1; MG/1
1 TABLET ORAL EVERY 6 HOURS PRN
Qty: 5 TABLET | Refills: 0 | Status: SHIPPED | OUTPATIENT
Start: 2022-01-21 | End: 2022-02-10

## 2022-01-21 RX ORDER — LIDOCAINE HYDROCHLORIDE 20 MG/ML
INJECTION, SOLUTION INFILTRATION; PERINEURAL PRN
Status: DISCONTINUED | OUTPATIENT
Start: 2022-01-21 | End: 2022-01-21

## 2022-01-21 RX ORDER — PROPOFOL 10 MG/ML
INJECTION, EMULSION INTRAVENOUS PRN
Status: DISCONTINUED | OUTPATIENT
Start: 2022-01-21 | End: 2022-01-21

## 2022-01-21 RX ORDER — ONDANSETRON 2 MG/ML
INJECTION INTRAMUSCULAR; INTRAVENOUS PRN
Status: DISCONTINUED | OUTPATIENT
Start: 2022-01-21 | End: 2022-01-21

## 2022-01-21 RX ORDER — ALBUTEROL SULFATE 90 UG/1
AEROSOL, METERED RESPIRATORY (INHALATION) PRN
Status: DISCONTINUED | OUTPATIENT
Start: 2022-01-21 | End: 2022-01-21

## 2022-01-21 RX ORDER — ONDANSETRON 4 MG/1
4 TABLET, ORALLY DISINTEGRATING ORAL EVERY 30 MIN PRN
Status: DISCONTINUED | OUTPATIENT
Start: 2022-01-21 | End: 2022-01-21 | Stop reason: HOSPADM

## 2022-01-21 RX ADMIN — MIDAZOLAM 1 MG: 1 INJECTION INTRAMUSCULAR; INTRAVENOUS at 07:28

## 2022-01-21 RX ADMIN — Medication 20 MG: at 07:48

## 2022-01-21 RX ADMIN — APREPITANT 40 MG: 40 CAPSULE ORAL at 07:18

## 2022-01-21 RX ADMIN — ONDANSETRON 4 MG: 2 INJECTION INTRAMUSCULAR; INTRAVENOUS at 07:55

## 2022-01-21 RX ADMIN — PROPOFOL 150 MG: 10 INJECTION, EMULSION INTRAVENOUS at 07:33

## 2022-01-21 RX ADMIN — LIDOCAINE HYDROCHLORIDE 100 MG: 20 INJECTION, SOLUTION INFILTRATION; PERINEURAL at 07:33

## 2022-01-21 RX ADMIN — DEXAMETHASONE SODIUM PHOSPHATE 4 MG: 4 INJECTION, SOLUTION INTRA-ARTICULAR; INTRALESIONAL; INTRAMUSCULAR; INTRAVENOUS; SOFT TISSUE at 07:47

## 2022-01-21 RX ADMIN — SODIUM CHLORIDE, POTASSIUM CHLORIDE, SODIUM LACTATE AND CALCIUM CHLORIDE: 600; 310; 30; 20 INJECTION, SOLUTION INTRAVENOUS at 07:19

## 2022-01-21 RX ADMIN — ROCURONIUM BROMIDE 10 MG: 50 INJECTION, SOLUTION INTRAVENOUS at 07:50

## 2022-01-21 RX ADMIN — ALBUTEROL SULFATE 6 PUFF: 90 AEROSOL, METERED RESPIRATORY (INHALATION) at 08:05

## 2022-01-21 RX ADMIN — SUGAMMADEX 320 MG: 100 INJECTION, SOLUTION INTRAVENOUS at 08:07

## 2022-01-21 RX ADMIN — ROCURONIUM BROMIDE 50 MG: 50 INJECTION, SOLUTION INTRAVENOUS at 07:34

## 2022-01-21 RX ADMIN — FENTANYL CITRATE 100 MCG: 50 INJECTION, SOLUTION INTRAMUSCULAR; INTRAVENOUS at 07:33

## 2022-01-21 RX ADMIN — CEFAZOLIN SODIUM 2 G: 2 INJECTION, SOLUTION INTRAVENOUS at 07:26

## 2022-01-21 ASSESSMENT — COPD QUESTIONNAIRES
CAT_SEVERITY: SEVERE
COPD: 1

## 2022-01-21 ASSESSMENT — ACTIVITIES OF DAILY LIVING (ADL)
ADLS_ACUITY_SCORE: 4
ADLS_ACUITY_SCORE: 4
ADLS_ACUITY_SCORE: 10
ADLS_ACUITY_SCORE: 4
ADLS_ACUITY_SCORE: 3

## 2022-01-21 ASSESSMENT — LIFESTYLE VARIABLES: TOBACCO_USE: 0

## 2022-01-21 ASSESSMENT — MIFFLIN-ST. JEOR: SCORE: 1533.25

## 2022-01-21 NOTE — OR NURSING
Up to BR voids light pale yellow X1- AOX3-VSS-O2 sats >92% RA- Good PO intake, Denies c/o-Discharge instructions by phone per [facility] Policy w wife Adeola- added instructions on need and importance of use for I/S and DB&C-  Pt and responsible adult verbalize understanding of discharge instructions, denies questions. Up in W/C - transported to door for discharge to home.

## 2022-01-21 NOTE — ANESTHESIA POSTPROCEDURE EVALUATION
Patient: Harrison Thomas    Procedure: Procedure(s):  right video assisted exploratory thoracoscopy       Diagnosis:Nodule of upper lobe of right lung [R91.1]  Diagnosis Additional Information: No value filed.    Anesthesia Type:  General    Note:  Disposition: Outpatient   Postop Pain Control: Uneventful            Sign Out: Well controlled pain   PONV: No   Neuro/Psych: Uneventful            Sign Out: Acceptable/Baseline neuro status   Airway/Respiratory: Uneventful            Sign Out: Acceptable/Baseline resp. status   CV/Hemodynamics: Uneventful            Sign Out: Acceptable CV status   Other NRE: NONE   DID A NON-ROUTINE EVENT OCCUR? No           Last vitals:  Vitals Value Taken Time   /68 01/21/22 1250   Temp 36.4  C (97.6  F) 01/21/22 1115   Pulse 65 01/21/22 1254   Resp 17 01/21/22 1254   SpO2 100 % 01/21/22 1254   Vitals shown include unvalidated device data.    Electronically Signed By: Tom Rios MD  January 21, 2022  3:43 PM

## 2022-01-21 NOTE — ANESTHESIA CARE TRANSFER NOTE
Patient: Harrison Thomas    Procedure: Procedure(s):  right video assisted exploratory thoracoscopy       Diagnosis: Nodule of upper lobe of right lung [R91.1]  Diagnosis Additional Information: No value filed.    Anesthesia Type:   General     Note:    Oropharynx: oropharynx clear of all foreign objects  Level of Consciousness: awake  Oxygen Supplementation: face mask  Level of Supplemental Oxygen (L/min / FiO2): 6  Independent Airway: airway patency satisfactory and stable  Dentition: dentition unchanged  Vital Signs Stable: post-procedure vital signs reviewed and stable  Report to RN Given: handoff report given  Patient transferred to: PACU  Comments: Neuromuscular blockade reversed with sugammadex, spontaneous respirations, adequate tidal volumes, followed commands to voice, oropharynx suctioned with soft flexible catheter, extubated atraumatically, extubated with suction, airway patent after extubation.  Oxygen via facemask at 6 liters per minute to PACU. Oxygen tubing connected to wall O2 in PACU, SpO2, NiBP, and EKG monitors and alarms on and functioning, report on patient's clinical status given to PACU RN, RN questions answered.     Handoff Report: Identifed the Patient, Identified the Reponsible Provider, Reviewed the pertinent medical history, Discussed the surgical course, Reviewed Intra-OP anesthesia mangement and issues during anesthesia, Set expectations for post-procedure period and Allowed opportunity for questions and acknowledgement of understanding      Vitals:  Vitals Value Taken Time   /100 01/21/22 0815   Temp     Pulse 64 01/21/22 0819   Resp 27 01/21/22 0819   SpO2 100 % 01/21/22 0819   Vitals shown include unvalidated device data.    Electronically Signed By: PREETI Collins CRNA  January 21, 2022  8:20 AM
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
## 2022-01-21 NOTE — OP NOTE
Procedure Date: 01/21/2022    SURGEON:  Westley Dumont MD    ASSISTANT:  Joyce Durham PA-C.    PREOPERATIVE DIAGNOSIS:  Right upper lobe lung nodule.    POSTOPERATIVE DIAGNOSIS:  Right upper lobe lung nodule.    PROCEDURE:  Right exploratory video-assisted thoracoscopy.    ANESTHESIA:  General with double-lumen endotracheal tube.    INDICATIONS FOR PROCEDURE:  A 49-year-old gentleman who was found to have a right upper lobe lung nodule.  This nodule increased in size over time.  Initially he had a thyroid nodule and some right paratracheal adenopathy that were related to thyroid cancer.  This was all treated.  The most recent PET/CT scan showed an enlarging now 1.4 cm nodule in the posterior segment of the right upper lobe lung.  The patient has a history of liver resection in the past, with the incision extending into the thorax anteriorly and a smaller incision at the base of the right chest wall.  Options of a diagnostic procedure and treatment were discussed and he elected to proceed with video-assisted thoracoscopy and wedge resection for diagnosis and treatment.    DESCRIPTION OF PROCEDURE:  The patient was brought and placed in the supine position under general anesthesia with double endotracheal tube.  The patient was placed in the left decubitus position.  The right chest was prepared and draped in the usual fashion with ChloraPrep.  Ventilation of the right lung was continued.  A small incision was made in the mid axillary line, higher than usual.  The pleural space was carefully entered.  The video thoracoscope was introduced.  There was no space.  There were significant pleural adhesions where the only way the procedure could be performed would be an extensive thoracotomy.  Based on this patient's overall pulmonary function, it was elected not to proceed any further.  The patient will be sent for consideration of SBRT with or without biopsy.  There was no air leak.  There was no space to  leave a chest tube.  The incision was closed in the usual fashion.    ESTIMATED BLOOD LOSS:  Minimal.    COUNTS:  Sponge count is correct.    Joyce Durham, was the first assistant during the procedure.  Her role as first assistant was essential and necessary in accomplishing the steps of the procedure as described above, providing exposure and handling of the scope.    Westley Dumont MD        D: 2022   T: 2022   MT: [id number]    Name:     ABDIRASHID EPSTEIN  MRN:      [id number]        Account:        [id number]   :      1945           Procedure Date: 2022     Document: [id number]

## 2022-01-21 NOTE — ANESTHESIA PROCEDURE NOTES
Airway       Patient location during procedure: OR       Procedure Start/Stop Times: 1/21/2022 7:37 AM  Staff -        Anesthesiologist:  Tom Rios MD       CRNA: Carly Casey APRN CRNA       Performed By: CRNA and anesthesiologist  Consent for Airway        Urgency: elective  Indications and Patient Condition       Indications for airway management: janes-procedural       Induction type:intravenous       Mask difficulty assessment: 2 - vent by mask + OA or adjuvant +/- NMBA    Final Airway Details       Final airway type: endotracheal airway       Successful airway: ETT - double lumen left  Endotracheal Airway Details        Cuffed: yes       Successful intubation technique: video laryngoscopy and flexible bronchoscopy       VL Blade Size: Glidescope 3       Grade View of Cords: 1       Adjucts: stylet       Position: Right       Measured from: gums/teeth       Secured at (cm): 29       Bite block used: None       ETT Double lumen (fr): 37    Post intubation assessment        Placement verified by: capnometry, equal breath sounds and chest rise        Number of attempts at approach: 1       Number of other approaches attempted: 0       Secured with: pink tape       Ease of procedure: easy       Dentition: Unchanged

## 2022-01-21 NOTE — BRIEF OP NOTE
[facility]    Brief Operative Note    Pre-operative diagnosis: Nodule of upper lobe of right lung [R91.1]  Post-operative diagnosis Right Upper Lobe Lung Nodule    Procedure: Procedure(s):  right video assisted exploratory thoracoscopy  Surgeon: Surgeon(s) and Role:     * Westley Dumont MD - Primary     * Joyce Durham PA-C - Assisting  Anesthesia: General   Estimated Blood Loss: Minimal    Drains: None  Specimens:None  Findings: Dense adhesions within right pleural space prevented safe lung resection. No evidence for airleak at the end of the case. No chest tube placed.   Complications: None.  Implants: * No implants in log *     Joyce Durham PA-C with Dr. Westley Dumont  [facility]  Cell [phone]

## 2022-01-21 NOTE — DISCHARGE INSTRUCTIONS
"[facility]  Discharge Orders & Follow-up Care  Video-Assisted Thoracoscopy or Thoracotomy    Follow up with Dr. Dumont (Thoracic Surgery) in 7-10 days at the [facility] office.  You will have a Chest x-ray 30 minutes prior at [facility] on the 1st floor of the Building, then your appointment will be immediately to follow. Please call Cindy at [phone] to schedule this appointment.  The [facility] office is located at [address] [place], [place] [address].     A. Patient Care:  Call Dr Dumont  office @ [phone] if you experience:  *Severe chills or a fever or 101 F or higher on two occasions  *Increased incisional pain that cannot be relieved with rest or pain medications  *Presence of unusual incisional or chest tube site drainage that is odorous, green or yellow in color, or if your incision is warm, red or swollen  *Coughing up bright red blood or greenish-yellow secretions  *Chest pain that gets worse with deep breathing or a significant increase in shortness of breath  *Inability to urinate or have a bowel movement  *New pain or swelling in your legs    In an emergency, call [phone] or have someone drive you to the nearest Emergency Department    Pain Relief:  You may have been given a prescription for narcotic pain medicine.  You may also take ibuprofen and acetaminophen either as a new prescription  or over the counter.  Recommended dosages are:  600 mg Ibuprofen every 6 hours as needed and 650 mg Acetaminophen every 4 hours as needed.  Many patients get good pain relief by \"staggering\" these medications.     Constipation:  Narcotic pain medication, general anesthesia, and time in the hospital with less activity than normal can all cause constipation. Please take a stool softener (what you have at home or one that was prescribed during hospital discharge, such as Senokot-S, docusate sodium, Miralax, Milk of Magnesia) while you are taking narcotics to prevent " constipation. Stop taking the stool softener once you are done taking narcotics or if you begin having loose stools/diarrhea. Please call our clinic nurse, Zaria, at [phone] if you are not having success (not having BMs) with your current stool softener.     No driving while on narcotics.     Activity:  ___ No activity restrictions for a scope procedure/thoracoscopy    Wound Care:  *You should look at your incision each day and keep it clean while it heals  *Do not apply any creams, salves such as Bacitracin, or ointments on the incision while it is healing  * Steri strips (thin white paper strips) will be present on the incision(s) and they will peel off as your incision heals-- otherwise, they will be removed at your post-op appointment.    Wash the incision site daily with soap and water. No bathing or immersing incision underwater for approximately 2 weeks or until the chest tube sites are completely healed.     C. Activity:  It may take a few months to regain your normal energy level/stamina. It is important during your recovery to get regular physical activity:  *walk each day at a comfortable pace  *climb stairs as tolerated  *take some rest periods each day but try not to take too many long naps, as this can affect your sleep at night      Same Day Surgery Discharge Instructions for  Sedation and General Anesthesia       It's not unusual to feel dizzy, light-headed or faint for up to 24 hours after surgery or while taking pain medication.  If you have these symptoms: sit for a few minutes before standing and have someone assist you when you get up to walk or use the bathroom.      You should rest and relax for the next 24 hours. We recommend you make arrangements to have an adult stay with you for at least 24 hours after your discharge.  Avoid hazardous and strenuous activity.      DO NOT DRIVE any vehicle or operate mechanical equipment for 24 hours following the end of your surgery.  Even though you  may feel normal, your reactions may be affected by the medication you have received.      Do not drink alcoholic beverages for 24 hours following surgery.       Slowly progress to your regular diet as you feel able. It's not unusual to feel nauseated and/or vomit after receiving anesthesia.  If you develop these symptoms, drink clear liquids (apple juice, ginger ale, broth, 7-up, etc. ) until you feel better.  If your nausea and vomiting persists for 24 hours, please notify your surgeon.        All narcotic pain medications, along with inactivity and anesthesia, can cause constipation. Drinking plenty of liquids and increasing fiber intake will help.      For any questions of a medical nature, call your surgeon.      Do not make important decisions for 24 hours.      If you had general anesthesia, you may have a sore throat for a couple of days related to the breathing tube used during surgery.  You may use Cepacol lozenges to help with this discomfort.  If it worsens or if you develop a fever, contact your surgeon.       If you feel your pain is not well managed with the pain medications prescribed by your surgeon, please contact your surgeon's office to let them know so they can address your concerns.       CoVid 19 Information    We want to give you information regarding Covid. Please consult your primary care provider with any questions you might have.     Patient who have symptoms (cough, fever, or shortness of breath), need to isolate for 7 days from when symptoms started OR 72 hours after fever resolves (without fever reducing medications) AND improvement of respiratory symptoms (whichever is longer).      Isolate yourself at home (in own room/own bathroom if possible)    Do Not allow any visitors    Do Not go to work or school    Do Not go to [employer],  centers, shopping, or other public places.    Do Not shake hands.    Avoid close and intimate contact with others (hugging, kissing).    Follow CDC  recommendations for household cleaning of frequently touched services.     After the initial 7 days, continue to isolate yourself from household members as much as possible. To continue decrease the risk of community spread and exposure, you and any members of your household should limit activities in public for 14 days after starting home isolation.     You can reference the following CDC link for helpful home isolation/care tips:  https://www.cdc.gov/coronavirus/2019-ncov/downloads/10Things.pdf    Protect Others:    Cover Your Mouth and Nose with a mask, disposable tissue or wash cloth to avoid spreading germs to others.    Wash your hands and face frequently with soap and water    Call Your Primary Doctor If: Breathing difficulty develops or you become worse.    For more information about COVID19 and options for caring for yourself at home, please visit the CDC website at https://www.cdc.gov/coronavirus/2019-ncov/about/steps-when-sick.html  For more options for care at [facility], please visit our website at [email or web address]        **Because you had anesthesia today and your history of sleep apnea, it is extremely important that you use your CPAP machine for the next 24 hours while napping or sleeping.**      **If you have questions or concerns about your procedure,  call Dr. Dumont at [phone]**

## 2022-01-26 ENCOUNTER — TRANSFERRED RECORDS (OUTPATIENT)
Dept: HEALTH INFORMATION MANAGEMENT | Facility: CLINIC | Age: 77
End: 2022-01-26
Payer: MEDICARE

## 2022-01-28 ENCOUNTER — TRANSFERRED RECORDS (OUTPATIENT)
Dept: HEALTH INFORMATION MANAGEMENT | Facility: CLINIC | Age: 77
End: 2022-01-28
Payer: MEDICARE

## 2022-01-29 ENCOUNTER — NURSE TRIAGE (OUTPATIENT)
Dept: NURSING | Facility: CLINIC | Age: 77
End: 2022-01-29
Payer: MEDICARE

## 2022-01-29 NOTE — TELEPHONE ENCOUNTER
"Patient calling. He reports ;  Fell and hit his head last night. and left   A laceration above his left eyebrow, very close to left eyebrow.  He reports the injury is about 1/2 inch long. He used ice, and  Neosporin ointment , and butterfly bandages , and it has not bled on its own he states since last night. Patient states he keeps pressing on it, and when he does this, it bleeds a small amount.    Patient states, ' I do not want to go to ER\"  Patient asked if he should go to the , and he was advised that if the wound is gaping , and bleeds on it;s own, he should   Go.    Patient was advised to keep wound clean, and apply neosporin, and apply butterfly   Bandages. Also ok to apply ice .  He had a TD shot  On 12/01/2021  He reports no other injuries.    Jud Walter, RN  [facility] Triage/refill nurse            Reason for Disposition    Injury mainly to forehead or head    ALSO, superficial cut (scratch) or abrasion (scrape) is present    Additional Information    Negative: [1] Major bleeding (e.g., actively dripping or spurting) AND [2] can't be stopped    Negative: Bullet wound, knife wound, or other penetrating object    Negative: Difficulty breathing or choking    Negative: Knocked out (unconscious) > 1 minute    Negative: Difficult to awaken or acting confused (e.g., disoriented, slurred speech)    Negative: Severe neck pain    Negative: Sounds like a life-threatening emergency to the triager    Negative: [1] Diagnosed with concussion AND [2] within last 14 days    Negative: [1] Traumatic brain injury (mTBI; concussion) AND [2] more than 14 days since head injury    Negative: [1] ACUTE NEURO SYMPTOM AND [2] present now  (DEFINITION: difficult to awaken OR confused thinking and talking OR slurred speech OR weakness of arms OR unsteady walking)    Negative: Knocked out (unconscious) > 1 minute    Negative: Seizure (convulsion) occurred  (Exception: prior history of seizures and now alert and without " "Acute Neuro Symptoms)    Negative: Penetrating head injury (e.g., knife, gun shot wound, metal object)    Negative: [1] Major bleeding (e.g., actively dripping or spurting) AND [2] can't be stopped    Negative: [1] Dangerous mechanism of injury (e.g., MVA, diving, trampoline, contact sports, fall > 10 feet or 3 meters) AND [2] NECK pain AND [3] began < 1 hour after injury    Negative: Sounds like a life-threatening emergency to the triager    Negative: Can't remember what happened (amnesia)    Negative: Vomiting once or more    Negative: [1] Loss of vision or double vision AND [2] present now    Negative: Watery or blood-tinged fluid dripping from the NOSE or EARS now  (Exception: tears from crying or nosebleed from nasal trauma)    Negative: [1] One or two \"black eyes\" (bruising, purple color of eyelids) AND [2] onset within 24 hours of head injury    Negative: Large swelling or bruise > 2 inches (5 cm)    Negative: Skin is split open or gaping  (or length > 1/2 inch or 12 mm)    Negative: [1] Bleeding AND [2] won't stop after 10 minutes of direct pressure (using correct technique)    Negative: Sounds like a serious injury to the triager    Negative: [1] ACUTE NEURO SYMPTOM AND [2] now fine  (DEFINITION: difficult to awaken OR confused thinking and talking OR slurred speech OR weakness of arms OR unsteady walking)    Negative: [1] Knocked out (unconscious) < 1 minute AND [2] now fine    Negative: [1] SEVERE headache AND [2] not improved 2 hours after pain medicine/ice packs    Negative: Dangerous injury (e.g., MVA, diving, trampoline, contact sports, fall > 10 feet or 3 meters) or severe blow from hard object (e.g., golf club or baseball bat)    Negative: Taking Coumadin (warfarin) or other strong blood thinner, or known bleeding disorder (e.g., thrombocytopenia)    Negative: Suspicious history for the injury    Negative: [1] Age over 65 years AND [2] swelling or bruise    Negative: Patient is confused or is an " unreliable provider of information (e.g., dementia, severe intellectual disability, alcohol intoxication)    Negative: [1] No prior tetanus shots (or is not fully vaccinated) AND [2] any wound (e.g., cut, scrape)    Negative: [1] HIV positive or severe immunodeficiency (severely weak immune system) AND [2] DIRTY cut or scrape    Negative: [1] Black eye (i.e., bruise around the eye) AND [2] onset > 24 hours following a forehead bruise    Negative: Scalp swelling, bruise or pain    Negative: [1] Last tetanus shot > 5 years ago AND [2] DIRTY cut or scrape    Negative: [1] Last tetanus shot > 10 years ago AND [2] CLEAN cut or scrape (e.g., object AND skin were clean)    Negative: [1] After 72 hours AND [2] headache persists    Protocols used: FACE INJURY-A-AH, HEAD INJURY-A-AH

## 2022-01-30 NOTE — TELEPHONE ENCOUNTER
Review of contract field for contract clinics. Patient PCP listed with [facility], contract field updated to reflect  primary care.     Karson Salguero, RN Manager Nursing Contact Center 01/30/22

## 2022-02-02 ENCOUNTER — TRANSFERRED RECORDS (OUTPATIENT)
Dept: HEALTH INFORMATION MANAGEMENT | Facility: CLINIC | Age: 77
End: 2022-02-02
Payer: MEDICARE

## 2022-02-02 ENCOUNTER — MEDICAL CORRESPONDENCE (OUTPATIENT)
Dept: HEALTH INFORMATION MANAGEMENT | Facility: CLINIC | Age: 77
End: 2022-02-02
Payer: MEDICARE

## 2022-02-02 DIAGNOSIS — M48.062 SPINAL STENOSIS OF LUMBAR REGION WITH NEUROGENIC CLAUDICATION: ICD-10-CM

## 2022-02-02 NOTE — TELEPHONE ENCOUNTER
Patient called to request Rx Hydrocodone.    Patient did not fill Rx from 1/18/22.     Last Written Prescription Date:  1/18/21 however patient states did not fill that RX.    Last filled Rx Hydrocodone: 12/16/211  Last Fill Quantity: 180,  # refills: 0   Last office visit: 12/29/2021 with prescribing provider:  Dr Posadas  Last appointment with PCP 11/1/21   Future Office Visit:   Next 5 appointments (look out 90 days)    Feb 23, 2022 10:15 AM  Return Visit with Abimael Fatima MD  [facility] ([facility] ) [address]  [facility] [place] [facility] [address]  [phone]   Apr 06, 2022 12:40 PM  Return Visit with Niles Cedillo MD  [facility] ([facility] ) [address] [address]  [phone]   May 02, 2022  9:00 AM  (Arrive by 8:40 AM)  Provider Visit with Yaneli Dozier MD  [facility] ([facility] ) [address], [address]  [place] [address]  [phone]             Brenda COLEMAN, RN      February 2, 2022  3:42 PM

## 2022-02-03 RX ORDER — HYDROCODONE BITARTRATE AND ACETAMINOPHEN 5; 325 MG/1; MG/1
1 TABLET ORAL EVERY 6 HOURS PRN
Qty: 180 TABLET | Refills: 0 | Status: SHIPPED | OUTPATIENT
Start: 2022-02-03 | End: 2022-03-15

## 2022-02-07 DIAGNOSIS — Z11.59 ENCOUNTER FOR SCREENING FOR OTHER VIRAL DISEASES: Primary | ICD-10-CM

## 2022-02-08 ENCOUNTER — LAB (OUTPATIENT)
Dept: URGENT CARE | Facility: URGENT CARE | Age: 77
End: 2022-02-08
Payer: MEDICARE

## 2022-02-08 DIAGNOSIS — Z11.59 ENCOUNTER FOR SCREENING FOR OTHER VIRAL DISEASES: ICD-10-CM

## 2022-02-08 PROCEDURE — U0003 INFECTIOUS AGENT DETECTION BY NUCLEIC ACID (DNA OR RNA); SEVERE ACUTE RESPIRATORY SYNDROME CORONAVIRUS 2 (SARS-COV-2) (CORONAVIRUS DISEASE [COVID-19]), AMPLIFIED PROBE TECHNIQUE, MAKING USE OF HIGH THROUGHPUT TECHNOLOGIES AS DESCRIBED BY CMS-2020-01-R: HCPCS

## 2022-02-08 PROCEDURE — U0005 INFEC AGEN DETEC AMPLI PROBE: HCPCS

## 2022-02-09 LAB — SARS-COV-2 RNA RESP QL NAA+PROBE: NEGATIVE

## 2022-02-09 RX ORDER — LIDOCAINE 40 MG/G
CREAM TOPICAL
Status: CANCELLED | OUTPATIENT
Start: 2022-02-09

## 2022-02-10 ENCOUNTER — OFFICE VISIT (OUTPATIENT)
Dept: FAMILY MEDICINE | Facility: CLINIC | Age: 77
End: 2022-02-10
Payer: MEDICARE

## 2022-02-10 ENCOUNTER — TELEPHONE (OUTPATIENT)
Dept: MEDSURG UNIT | Facility: CLINIC | Age: 77
End: 2022-02-10
Payer: MEDICARE

## 2022-02-10 VITALS
TEMPERATURE: 97.1 F | OXYGEN SATURATION: 95 % | SYSTOLIC BLOOD PRESSURE: 156 MMHG | BODY MASS INDEX: 26.12 KG/M2 | WEIGHT: 176.9 LBS | DIASTOLIC BLOOD PRESSURE: 75 MMHG | HEART RATE: 53 BPM | RESPIRATION RATE: 16 BRPM

## 2022-02-10 DIAGNOSIS — G47.33 OSA (OBSTRUCTIVE SLEEP APNEA): ICD-10-CM

## 2022-02-10 DIAGNOSIS — R91.1 NODULE OF UPPER LOBE OF RIGHT LUNG: ICD-10-CM

## 2022-02-10 DIAGNOSIS — J42 CHRONIC BRONCHITIS, UNSPECIFIED CHRONIC BRONCHITIS TYPE (H): ICD-10-CM

## 2022-02-10 DIAGNOSIS — Z01.818 PREOPERATIVE EXAMINATION: Primary | ICD-10-CM

## 2022-02-10 DIAGNOSIS — M05.79 RHEUMATOID ARTHRITIS INVOLVING MULTIPLE SITES WITH POSITIVE RHEUMATOID FACTOR (H): ICD-10-CM

## 2022-02-10 DIAGNOSIS — I10 ESSENTIAL HYPERTENSION, BENIGN: ICD-10-CM

## 2022-02-10 PROCEDURE — 99214 OFFICE O/P EST MOD 30 MIN: CPT | Performed by: PHYSICIAN ASSISTANT

## 2022-02-10 ASSESSMENT — PAIN SCALES - GENERAL: PAINLEVEL: NO PAIN (0)

## 2022-02-10 NOTE — TELEPHONE ENCOUNTER
Pre-Procedure Negative COVID Test Results    Results Reviewed  The patient has a negative COVID test result within the required timeframe for the scheduled procedure.     No COVID pre-call needed.     Cady Yun RN
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
## 2022-02-11 ENCOUNTER — HOSPITAL ENCOUNTER (OUTPATIENT)
Facility: CLINIC | Age: 77
Discharge: HOME | End: 2022-02-11
Admitting: RADIOLOGY
Payer: MEDICARE

## 2022-02-11 ENCOUNTER — HOSPITAL ENCOUNTER (OUTPATIENT)
Dept: CT IMAGING | Facility: CLINIC | Age: 77
End: 2022-02-11
Attending: THORACIC SURGERY (CARDIOTHORACIC VASCULAR SURGERY)
Payer: MEDICARE

## 2022-02-11 ENCOUNTER — APPOINTMENT (OUTPATIENT)
Dept: GENERAL RADIOLOGY | Facility: CLINIC | Age: 77
End: 2022-02-11
Payer: MEDICARE

## 2022-02-11 VITALS
TEMPERATURE: 97 F | SYSTOLIC BLOOD PRESSURE: 145 MMHG | HEART RATE: 55 BPM | DIASTOLIC BLOOD PRESSURE: 68 MMHG | RESPIRATION RATE: 20 BRPM | OXYGEN SATURATION: 91 %

## 2022-02-11 DIAGNOSIS — R91.8 LUNG NODULE, MULTIPLE: ICD-10-CM

## 2022-02-11 LAB
INR PPP: 1.03 (ref 0.85–1.15)
PLATELET # BLD AUTO: 191 10E3/UL (ref 150–450)

## 2022-02-11 PROCEDURE — 999N000065 XR CHEST 1 VIEW

## 2022-02-11 PROCEDURE — 36415 COLL VENOUS BLD VENIPUNCTURE: CPT | Performed by: PHYSICIAN ASSISTANT

## 2022-02-11 PROCEDURE — 272N000431 CT LUNG MEDIASTINUM BIOPSY

## 2022-02-11 PROCEDURE — 99152 MOD SED SAME PHYS/QHP 5/>YRS: CPT

## 2022-02-11 PROCEDURE — 250N000009 HC RX 250: Performed by: RADIOLOGY

## 2022-02-11 PROCEDURE — 88342 IMHCHEM/IMCYTCHM 1ST ANTB: CPT | Mod: 26 | Performed by: PATHOLOGY

## 2022-02-11 PROCEDURE — 88341 IMHCHEM/IMCYTCHM EA ADD ANTB: CPT | Mod: 26 | Performed by: PATHOLOGY

## 2022-02-11 PROCEDURE — 85610 PROTHROMBIN TIME: CPT | Mod: GZ | Performed by: PHYSICIAN ASSISTANT

## 2022-02-11 PROCEDURE — 250N000011 HC RX IP 250 OP 636: Performed by: RADIOLOGY

## 2022-02-11 PROCEDURE — 85049 AUTOMATED PLATELET COUNT: CPT | Performed by: PHYSICIAN ASSISTANT

## 2022-02-11 PROCEDURE — 32408 CORE NDL BX LNG/MED PERQ: CPT

## 2022-02-11 PROCEDURE — 88305 TISSUE EXAM BY PATHOLOGIST: CPT | Mod: TC | Performed by: THORACIC SURGERY (CARDIOTHORACIC VASCULAR SURGERY)

## 2022-02-11 PROCEDURE — 999N000154 HC STATISTIC RADIOLOGY XRAY, US, CT, MAR, NM

## 2022-02-11 RX ORDER — NALOXONE HYDROCHLORIDE 0.4 MG/ML
0.2 INJECTION, SOLUTION INTRAMUSCULAR; INTRAVENOUS; SUBCUTANEOUS
Status: DISCONTINUED | OUTPATIENT
Start: 2022-02-11 | End: 2022-02-12 | Stop reason: HOSPADM

## 2022-02-11 RX ORDER — FLUMAZENIL 0.1 MG/ML
0.2 INJECTION, SOLUTION INTRAVENOUS
Status: DISCONTINUED | OUTPATIENT
Start: 2022-02-11 | End: 2022-02-12 | Stop reason: HOSPADM

## 2022-02-11 RX ORDER — ACETAMINOPHEN 325 MG/1
650 TABLET ORAL EVERY 4 HOURS PRN
Status: DISCONTINUED | OUTPATIENT
Start: 2022-02-11 | End: 2022-02-11 | Stop reason: HOSPADM

## 2022-02-11 RX ORDER — FENTANYL CITRATE 50 UG/ML
25-50 INJECTION, SOLUTION INTRAMUSCULAR; INTRAVENOUS EVERY 5 MIN PRN
Status: DISCONTINUED | OUTPATIENT
Start: 2022-02-11 | End: 2022-02-12 | Stop reason: HOSPADM

## 2022-02-11 RX ORDER — NALOXONE HYDROCHLORIDE 0.4 MG/ML
0.4 INJECTION, SOLUTION INTRAMUSCULAR; INTRAVENOUS; SUBCUTANEOUS
Status: DISCONTINUED | OUTPATIENT
Start: 2022-02-11 | End: 2022-02-12 | Stop reason: HOSPADM

## 2022-02-11 RX ORDER — ACETAMINOPHEN 325 MG/1
650 TABLET ORAL EVERY 4 HOURS PRN
Status: CANCELLED | OUTPATIENT
Start: 2022-02-11

## 2022-02-11 RX ORDER — LIDOCAINE 40 MG/G
CREAM TOPICAL
Status: DISCONTINUED | OUTPATIENT
Start: 2022-02-11 | End: 2022-02-11 | Stop reason: HOSPADM

## 2022-02-11 RX ADMIN — MIDAZOLAM 1 MG: 1 INJECTION INTRAMUSCULAR; INTRAVENOUS at 09:18

## 2022-02-11 RX ADMIN — LIDOCAINE HYDROCHLORIDE 10 ML: 10 INJECTION, SOLUTION EPIDURAL; INFILTRATION; INTRACAUDAL; PERINEURAL at 09:28

## 2022-02-11 RX ADMIN — FENTANYL CITRATE 25 MCG: 50 INJECTION, SOLUTION INTRAMUSCULAR; INTRAVENOUS at 09:17

## 2022-02-11 NOTE — PROGRESS NOTES
RADIOLOGY PROCEDURE NOTE  Patient name: Harrison Thomas  MRN: [id number]  : 1945    Pre-procedure diagnosis: Right lung lesion.  Post-procedure diagnosis: Same    Procedure Date/Time: 2022  9:37 AM  Procedure: Biopsy.  Estimated blood loss: None  Specimen(s) collected with description: Core biopsy samples.  The patient tolerated the procedure well with no immediate complications.  I determined this patient to be an appropriate candidate for the planned sedation and procedure and reassessed the patient IMMEDIATELY PRIOR to sedation and procedure.    See imaging dictation for procedural details and findings.    Provider name: Jorge Meza MD  Assistant(s):None

## 2022-02-11 NOTE — PROGRESS NOTES
Care Suites Admission Nursing Note    Patient Information  Name: Harrison Thomas  Age: 76 year old  Reason for admission: ct lung biopsy  Care Suites arrival time: 0703    Visitor Information  Name: adriane  Informed of visitor restrictions: Yes  1 visitor allowed per patient   Visitor must screen negative for COVID symptoms   Visitor must wear a mask  Waiting rooms closed to visitors    Patient Admission/Assessment   Pre-procedure assessment complete: Yes  If abnormal assessment/labs, provider notified: N/A  NPO: Yes  Medications held per instructions/orders: Yes  Consent: obtained  If applicable, pregnancy test status: n/a  Patient oriented to room: Yes  Education/questions answered: Yes  Plan/other: proceed    Discharge Planning  Discharge name/phone number: adriane-spouse   Overnight post sedation caregiver: spouse  Discharge location: home  PATIENT/VISITOR WELLNESS SCREENING    Step 1 Patient Screening    1. In the last month, have you been in contact with someone who was confirmed or suspected to have Coronavirus/COVID-19? No    2. Do you have the following symptoms?  Fever/Chills? No   Cough? No   Shortness of breath? No   New loss of taste or smell? No  Sore throat? No  Muscle or body aches? No  Headaches? No  Fatigue? No  Vomiting or diarrhea? No    Step 2 Visitor Screening    1. Name of Visitor (1 visitor per patient): adriane    2. In the last month, have you been in contact with someone who was confirmed or suspected to have Coronavirus/COVID-19? No    3. Do you have the following symptoms?  Fever/Chills? No   Cough? No   Shortness of breath? No   Skin rash? No   Loss of taste or smell? No  Sore throat? No  Runny or stuffy nose? No  Muscle or body aches? No  Headaches? No  Fatigue? No  Vomiting or diarrhea? No    If the visitor has positive symptoms, notify supervisor/manger  Per policy, the visitor will need to leave the facility     Step 3 Refer to logic grid below for actions    NO  SYMPTOM(S)    ACTIONS:  1. Standard rooming process  2. Provider to assess per normal protocol  3. Implement precautions as needed and per guidelines     POSITIVE SYMPTOM(S)  If positive for ANY of the following symptoms: fever, cough, shortness of breath, rash    ACTION:  1. Continue to have the patient wear a mask   2. Room patient as soon as possible  3. Don appropriate PPE when entering room  4. Provider evaluation    Mike Knott RN

## 2022-02-11 NOTE — PROGRESS NOTES
Sedation Post Procedure Summary:    Immediately prior to starting the procedure a Time Out was conducted with procedural staff and re-confirmed the patient s name, procedure, and site/side. Md completed sedation assessment.     Consent obtained from patient after discussing the risks, benefits and alternatives.       Indication:  Sedation was required to allow for lung biopsy    Sedatives: Fentanyl 25 Mcg and Versed 1 Mg    Vital signs, airway, and pulse oximetry were monitored throughout the procedure and sedation was maintained until the procedure was complete.      Patient tolerance: Patient tolerated the procedure well with no immediate complications. Bx Site @ right armpit dry and intact with band aid. No bleeding or hematoma at completion.    Post-procedure report given to: Kennedy MCCLOUD and pt transferred to [facility] by cart.    (See Doc Flow-sheets and MAR for additional information)    Mike Knott RN

## 2022-02-11 NOTE — DISCHARGE INSTRUCTIONS
Lung Biopsy Discharge Instructions     After you go home:      You may resume your normal diet    Have an adult stay with you for 6 hours if you received sedation       For 24 hours - due to the sedation you received:    Relax and take it easy    Do NOT make any important or legal decisions    Do NOT drive or operate machines at home or at work    Do NOT drink alcohol    Care of Puncture Site:      For the first 48 hrs, check your puncture site every couple hours while you are awake     You may remove/change the bandaid tomorrow    You may shower tomorrow    No tub baths, whirlpools or swimming until your puncture site has fully healed    Activity       You may go back to normal activity in 24 hours     Wait 48 hours before lifting, straining, exercise or other strenuous activity    Medicines:      You may resume all medications    Resume your Warfarin/Coumadin at your regular dose today. Follow up with your provider to have your INR rechecked    Resume your Platelet Inhibitors and Aspirin tomorrow at your regular dose    For minor pain, you may take Acetaminophen (Tylenol) or Ibuprofen (Advil)            Call the provider who ordered this test if:      Increased pain or a large or growing hard lump around the site    Blood or fluid is draining from the site    The site is red, swollen, hot or tender    Chills or a fever greater than 101 F (38 C)    Pain that is getting worse    Any questions or concerns    Call  911 or go to the Emergency Room if:      Severe pain or trouble breathing    Bleeding that you cannot control        If you have questions call:          [facility] @ [phone]      The provider who performed your procedure was _________________.

## 2022-02-11 NOTE — PROGRESS NOTES
Care Suites Post Procedure Note    Patient Information  Name: Harrison Thomas  Age: 76 year old    Post Procedure  CT guided Right Lung Biopsy   Time patient returned to Care Suites: 0935  Concerns/abnormal assessment:   Pt. Is having difficulty keeping sats over 90% on 3 liters/NC  VSS, denies discomfort   Right underarm site intact.   Lungs clear and equal   If abnormal assessment, provider notified:   Dr. Meza ordered post CXR at 1115.   Plan/Other: Bedrest for two hour, CXR  and discharge to home     1115- CXR  1127- Dr. Meza read CXR and reports no pneumo- ok for discharge  1200- Discharge to home  Wife driving     Care Suites Discharge Nursing Note    Discharge Education:  Discharge instructions reviewed: Yes  Additional education/resources provided: NA  Patient/patient representative verbalizes understanding: Yes  Patient discharging on new medications: No  Medication education completed: N/A    Discharge Plans:   Discharge location: home  Wife aware of patients low sats and CXR with no pneumo.   Wife states that patient has low sats at home   Discharge ride contacted: Yes  Approximate discharge time: 1230    Discharge Criteria:  Discharge criteria met and vital signs stable: Yes    Patient Belongs:  Patient belongings returned to patient: Yes    Kennedy Kohli RN

## 2022-02-15 LAB
PATH REPORT.COMMENTS IMP SPEC: NORMAL
PATH REPORT.COMMENTS IMP SPEC: NORMAL
PATH REPORT.FINAL DX SPEC: NORMAL
PATH REPORT.GROSS SPEC: NORMAL
PATH REPORT.MICROSCOPIC SPEC OTHER STN: NORMAL
PATH REPORT.RELEVANT HX SPEC: NORMAL
PHOTO IMAGE: NORMAL

## 2022-02-15 PROCEDURE — 88312 SPECIAL STAINS GROUP 1: CPT | Mod: 26 | Performed by: PATHOLOGY

## 2022-02-15 PROCEDURE — 88305 TISSUE EXAM BY PATHOLOGIST: CPT | Mod: 26 | Performed by: PATHOLOGY

## 2022-02-15 PROCEDURE — 88342 IMHCHEM/IMCYTCHM 1ST ANTB: CPT | Mod: 26 | Performed by: PATHOLOGY

## 2022-02-23 ENCOUNTER — OFFICE VISIT (OUTPATIENT)
Dept: CARDIOLOGY | Facility: CLINIC | Age: 77
End: 2022-02-23
Payer: MEDICARE

## 2022-02-23 VITALS
HEIGHT: 70 IN | SYSTOLIC BLOOD PRESSURE: 137 MMHG | WEIGHT: 174.5 LBS | DIASTOLIC BLOOD PRESSURE: 78 MMHG | HEART RATE: 57 BPM | BODY MASS INDEX: 24.98 KG/M2 | OXYGEN SATURATION: 94 %

## 2022-02-23 DIAGNOSIS — E78.5 HYPERLIPIDEMIA LDL GOAL <100: ICD-10-CM

## 2022-02-23 DIAGNOSIS — C79.9 METASTASIS FROM THYROID CANCER (H): ICD-10-CM

## 2022-02-23 DIAGNOSIS — C73 METASTASIS FROM THYROID CANCER (H): ICD-10-CM

## 2022-02-23 DIAGNOSIS — I25.10 CORONARY ARTERY DISEASE INVOLVING NATIVE CORONARY ARTERY OF NATIVE HEART WITHOUT ANGINA PECTORIS: Primary | ICD-10-CM

## 2022-02-23 DIAGNOSIS — J42 CHRONIC BRONCHITIS, UNSPECIFIED CHRONIC BRONCHITIS TYPE (H): ICD-10-CM

## 2022-02-23 DIAGNOSIS — R06.02 SOB (SHORTNESS OF BREATH): ICD-10-CM

## 2022-02-23 DIAGNOSIS — M05.79 RHEUMATOID ARTHRITIS INVOLVING MULTIPLE SITES WITH POSITIVE RHEUMATOID FACTOR (H): ICD-10-CM

## 2022-02-23 PROCEDURE — 99214 OFFICE O/P EST MOD 30 MIN: CPT | Performed by: INTERNAL MEDICINE
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
## 2022-02-27 DIAGNOSIS — E78.5 HYPERLIPIDEMIA, UNSPECIFIED: ICD-10-CM

## 2022-03-01 RX ORDER — ROSUVASTATIN CALCIUM 10 MG/1
TABLET, COATED ORAL
Qty: 90 TABLET | Refills: 3 | Status: SHIPPED | OUTPATIENT
Start: 2022-03-01 | End: 2023-02-22

## 2022-03-01 NOTE — TELEPHONE ENCOUNTER
Last OV - Vida Mcclure 2/10/22    LDL Cholesterol Calculated   Date Value Ref Range Status   03/03/2021 83 <100 mg/dL Final     Comment:     Desirable:       <100 mg/dl     Appointments in Next Year    Mar 08, 2022 11:30 AM  RETURN ENDOCRINE with Demarcus Simmons MD  [facility] ([facility] ) [phone]   Mar 16, 2022 10:15 AM  LAB with CS LAB  [facility] ([facility] ) [phone]   Apr 06, 2022 12:40 PM  Return Visit with Niles Cedillo MD  [facility] ([facility] ) [phone]   May 02, 2022  9:00 AM  (Arrive by 8:40 AM)  Provider Visit with Yaneli Dozier MD  [facility] ([facility] ) [phone]   May 27, 2022  8:30 AM  (Arrive by 8:15 AM)  Return Visit with Khoa Handy MD  [facility] ([facility] [place] ) [phone]        rosuvastatin (CRESTOR) 10 MG tablet     --   Sig - Route: Take 10 mg by mouth every evening - Oral   Class: Historical   Order: [id number]     Routing refill request to provider for review/approval because:  Med is historical on list -last filled #90 on 12/24/21 under PCP (10mg)     Quinn Galvan RN  [facility]

## 2022-03-08 ENCOUNTER — MYC MEDICAL ADVICE (OUTPATIENT)
Dept: ENDOCRINOLOGY | Facility: CLINIC | Age: 77
End: 2022-03-08

## 2022-03-08 ENCOUNTER — OFFICE VISIT (OUTPATIENT)
Dept: ENDOCRINOLOGY | Facility: CLINIC | Age: 77
End: 2022-03-08
Payer: MEDICARE

## 2022-03-08 VITALS
SYSTOLIC BLOOD PRESSURE: 131 MMHG | WEIGHT: 181.9 LBS | DIASTOLIC BLOOD PRESSURE: 69 MMHG | HEART RATE: 59 BPM | BODY MASS INDEX: 26.1 KG/M2

## 2022-03-08 DIAGNOSIS — C73 PAPILLARY THYROID CARCINOMA (H): ICD-10-CM

## 2022-03-08 DIAGNOSIS — E89.0 POSTSURGICAL HYPOTHYROIDISM: Primary | ICD-10-CM

## 2022-03-08 PROCEDURE — 99214 OFFICE O/P EST MOD 30 MIN: CPT | Performed by: INTERNAL MEDICINE
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
## 2022-03-15 ENCOUNTER — TELEPHONE (OUTPATIENT)
Dept: FAMILY MEDICINE | Facility: CLINIC | Age: 77
End: 2022-03-15
Payer: MEDICARE

## 2022-03-15 DIAGNOSIS — F11.90 CHRONIC, CONTINUOUS USE OF OPIOIDS: Primary | ICD-10-CM

## 2022-03-15 DIAGNOSIS — M48.062 SPINAL STENOSIS OF LUMBAR REGION WITH NEUROGENIC CLAUDICATION: ICD-10-CM

## 2022-03-15 RX ORDER — HYDROCODONE BITARTRATE AND ACETAMINOPHEN 5; 325 MG/1; MG/1
1 TABLET ORAL EVERY 6 HOURS PRN
Qty: 180 TABLET | Refills: 0 | Status: SHIPPED | OUTPATIENT
Start: 2022-03-15 | End: 2022-05-02

## 2022-03-15 NOTE — TELEPHONE ENCOUNTER
Pt called has lab visit tomorrow AM for Dr Simmons (Calcium, Thyrogloblulin Antibody, T4 Free, TSH) and Dr Cedillo (Hepatic function, CRP, ESR, creatinine, CBC with platelets)     Asking if PCP wants additional labs added/if yes asking that you enter orders     Also asking if you'll refill his Norco     Last OV 2/10/22 - with PCP     Routing refill request to provider for review/approval because:  Drug not on the [facility] refill protocol     Quinn Galvan RN  [facility]     HYDROcodone-acetaminophen (NORCO) 5-325 MG tablet 180 tablet 0 2/3/2022  No   Sig - Route: Take 1 tablet by mouth every 6 hours as needed for severe pain - Oral   Sent to pharmacy as: HYDROcodone-Acetaminophen 5-325 MG Oral Tablet (NORCO)   Class: E-Prescribe   Earliest Fill Date: 2/3/2022   Order: [id number]   E-Prescribing Status: Receipt confirmed by pharmacy (2/3/2022  7:19 AM CST)   Prior authorization: Closed - Prior Authorization not required for patient/medication       Appointments in Next Year    Mar 16, 2022 10:15 AM  LAB with CS LAB  [facility] ([facility] ) [phone]   Apr 06, 2022 12:40 PM  Return Visit with Niles Cedillo MD  [facility] ([facility] ) [phone]   Apr 11, 2022  1:00 PM  NM INJECTION THYROGEN with SHNMINJ  [facility] ([facility] ) [phone]   Apr 12, 2022  1:00 PM  NM INJECTION THYROGEN with SHNMINJ  [facility] ([facility] ) [phone]   Apr 13, 2022  1:00 PM   BODY with SHNMINJ  [facility] ([facility] ) [phone]   Apr 15, 2022  1:00 PM  NM THYROID WHOLE BODY SCAN I 131 with SHNM1  [facility] ([facility] ) [phone]   May 02, 2022  9:00 AM  (Arrive by 8:40  AM)  Provider Visit with Yaneli Dozier MD  [facility] ([facility] ) [phone]   May 27, 2022  8:30 AM  (Arrive by 8:15 AM)  Return Visit with Khoa Handy MD  [facility] ([facility] ) [phone]   Sep 07, 2022 10:30 AM  RETURN ENDOCRINE with Demarcus Simmons MD  [facility] [place] ([facility] ) [phone]

## 2022-03-16 ENCOUNTER — LAB (OUTPATIENT)
Dept: LAB | Facility: CLINIC | Age: 77
End: 2022-03-16
Payer: MEDICARE

## 2022-03-16 DIAGNOSIS — M05.79 RHEUMATOID ARTHRITIS INVOLVING MULTIPLE SITES WITH POSITIVE RHEUMATOID FACTOR (H): ICD-10-CM

## 2022-03-16 DIAGNOSIS — C73 PAPILLARY THYROID CARCINOMA (H): ICD-10-CM

## 2022-03-16 DIAGNOSIS — E89.0 POSTSURGICAL HYPOTHYROIDISM: ICD-10-CM

## 2022-03-16 DIAGNOSIS — Z79.899 HIGH RISK MEDICATIONS (NOT ANTICOAGULANTS) LONG-TERM USE: ICD-10-CM

## 2022-03-16 DIAGNOSIS — F11.90 CHRONIC, CONTINUOUS USE OF OPIOIDS: ICD-10-CM

## 2022-03-16 LAB
BASOPHILS # BLD AUTO: 0.1 10E3/UL (ref 0–0.2)
BASOPHILS NFR BLD AUTO: 1 %
CRP SERPL-MCNC: <2.9 MG/L (ref 0–8)
EOSINOPHIL # BLD AUTO: 0.9 10E3/UL (ref 0–0.7)
EOSINOPHIL NFR BLD AUTO: 14 %
ERYTHROCYTE [DISTWIDTH] IN BLOOD BY AUTOMATED COUNT: 14.9 % (ref 10–15)
ERYTHROCYTE [SEDIMENTATION RATE] IN BLOOD BY WESTERGREN METHOD: 10 MM/HR (ref 0–20)
HCT VFR BLD AUTO: 45.1 % (ref 40–53)
HGB BLD-MCNC: 14.8 G/DL (ref 13.3–17.7)
LYMPHOCYTES # BLD AUTO: 1.5 10E3/UL (ref 0.8–5.3)
LYMPHOCYTES NFR BLD AUTO: 24 %
MCH RBC QN AUTO: 30.3 PG (ref 26.5–33)
MCHC RBC AUTO-ENTMCNC: 32.8 G/DL (ref 31.5–36.5)
MCV RBC AUTO: 92 FL (ref 78–100)
MONOCYTES # BLD AUTO: 0.7 10E3/UL (ref 0–1.3)
MONOCYTES NFR BLD AUTO: 12 %
NEUTROPHILS # BLD AUTO: 3 10E3/UL (ref 1.6–8.3)
NEUTROPHILS NFR BLD AUTO: 49 %
PLATELET # BLD AUTO: 177 10E3/UL (ref 150–450)
RBC # BLD AUTO: 4.89 10E6/UL (ref 4.4–5.9)
WBC # BLD AUTO: 6.1 10E3/UL (ref 4–11)

## 2022-03-16 PROCEDURE — 85652 RBC SED RATE AUTOMATED: CPT

## 2022-03-16 PROCEDURE — 82248 BILIRUBIN DIRECT: CPT

## 2022-03-16 PROCEDURE — 84432 ASSAY OF THYROGLOBULIN: CPT | Mod: 90

## 2022-03-16 PROCEDURE — 86140 C-REACTIVE PROTEIN: CPT

## 2022-03-16 PROCEDURE — 84439 ASSAY OF FREE THYROXINE: CPT

## 2022-03-16 PROCEDURE — 36415 COLL VENOUS BLD VENIPUNCTURE: CPT

## 2022-03-16 PROCEDURE — 86800 THYROGLOBULIN ANTIBODY: CPT | Mod: 90

## 2022-03-16 PROCEDURE — 84443 ASSAY THYROID STIM HORMONE: CPT

## 2022-03-16 PROCEDURE — 80053 COMPREHEN METABOLIC PANEL: CPT

## 2022-03-16 PROCEDURE — 85025 COMPLETE CBC W/AUTO DIFF WBC: CPT

## 2022-03-16 PROCEDURE — 99000 SPECIMEN HANDLING OFFICE-LAB: CPT

## 2022-03-16 NOTE — TELEPHONE ENCOUNTER
Writer called patient, notified that labs were placed and were drawn at lab visit today and notified that script sent to preferred pharmacy.     Anastasiia Weiss RN  [facility]

## 2022-03-17 LAB
ALBUMIN SERPL-MCNC: 3.2 G/DL (ref 3.4–5)
ALP SERPL-CCNC: 102 U/L (ref 40–150)
ALT SERPL W P-5'-P-CCNC: 22 U/L (ref 0–70)
ANION GAP SERPL CALCULATED.3IONS-SCNC: 9 MMOL/L (ref 3–14)
AST SERPL W P-5'-P-CCNC: 13 U/L (ref 0–45)
BILIRUB DIRECT SERPL-MCNC: 0.1 MG/DL (ref 0–0.2)
BILIRUB SERPL-MCNC: 0.3 MG/DL (ref 0.2–1.3)
BUN SERPL-MCNC: 15 MG/DL (ref 7–30)
CALCIUM SERPL-MCNC: 8.9 MG/DL (ref 8.5–10.1)
CALCIUM SERPL-MCNC: 8.9 MG/DL (ref 8.5–10.1)
CHLORIDE BLD-SCNC: 108 MMOL/L (ref 94–109)
CO2 SERPL-SCNC: 26 MMOL/L (ref 20–32)
CREAT SERPL-MCNC: 0.92 MG/DL (ref 0.66–1.25)
GFR SERPL CREATININE-BSD FRML MDRD: 87 ML/MIN/1.73M2
GLUCOSE BLD-MCNC: 99 MG/DL (ref 70–99)
POTASSIUM BLD-SCNC: 4.7 MMOL/L (ref 3.4–5.3)
PROT SERPL-MCNC: 7.2 G/DL (ref 6.8–8.8)
SODIUM SERPL-SCNC: 143 MMOL/L (ref 133–144)
T4 FREE SERPL-MCNC: 1.26 NG/DL (ref 0.76–1.46)
TSH SERPL DL<=0.005 MIU/L-ACNC: 2.44 MU/L (ref 0.4–4)

## 2022-03-28 ENCOUNTER — MEDICAL CORRESPONDENCE (OUTPATIENT)
Dept: HEALTH INFORMATION MANAGEMENT | Facility: CLINIC | Age: 77
End: 2022-03-28
Payer: MEDICARE

## 2022-03-30 ENCOUNTER — TRANSFERRED RECORDS (OUTPATIENT)
Dept: HEALTH INFORMATION MANAGEMENT | Facility: CLINIC | Age: 77
End: 2022-03-30
Payer: MEDICARE

## 2022-03-31 LAB — SCANNED LAB RESULT: ABNORMAL

## 2022-04-01 ENCOUNTER — IMMUNIZATION (OUTPATIENT)
Dept: NURSING | Facility: CLINIC | Age: 77
End: 2022-04-01
Payer: MEDICARE

## 2022-04-01 PROCEDURE — 91305 COVID-19,PF,PFIZER (12+ YRS): CPT

## 2022-04-01 PROCEDURE — 0054A COVID-19,PF,PFIZER (12+ YRS): CPT

## 2022-04-05 DIAGNOSIS — I10 BENIGN ESSENTIAL HYPERTENSION: ICD-10-CM

## 2022-04-05 DIAGNOSIS — E89.0 POSTSURGICAL HYPOTHYROIDISM: ICD-10-CM

## 2022-04-06 ENCOUNTER — OFFICE VISIT (OUTPATIENT)
Dept: RHEUMATOLOGY | Facility: CLINIC | Age: 77
End: 2022-04-06
Payer: MEDICARE

## 2022-04-06 ENCOUNTER — ANCILLARY PROCEDURE (OUTPATIENT)
Dept: GENERAL RADIOLOGY | Facility: CLINIC | Age: 77
End: 2022-04-06
Attending: INTERNAL MEDICINE
Payer: MEDICARE

## 2022-04-06 VITALS
OXYGEN SATURATION: 94 % | WEIGHT: 179.4 LBS | SYSTOLIC BLOOD PRESSURE: 149 MMHG | DIASTOLIC BLOOD PRESSURE: 75 MMHG | HEART RATE: 60 BPM | BODY MASS INDEX: 25.74 KG/M2

## 2022-04-06 DIAGNOSIS — M85.80 OSTEOPENIA, UNSPECIFIED LOCATION: ICD-10-CM

## 2022-04-06 DIAGNOSIS — G89.29 CHRONIC MIDLINE LOW BACK PAIN WITH RIGHT-SIDED SCIATICA: Primary | ICD-10-CM

## 2022-04-06 DIAGNOSIS — M05.79 RHEUMATOID ARTHRITIS INVOLVING MULTIPLE SITES WITH POSITIVE RHEUMATOID FACTOR (H): ICD-10-CM

## 2022-04-06 DIAGNOSIS — M54.41 CHRONIC MIDLINE LOW BACK PAIN WITH RIGHT-SIDED SCIATICA: ICD-10-CM

## 2022-04-06 DIAGNOSIS — M25.552 LEFT HIP PAIN: ICD-10-CM

## 2022-04-06 DIAGNOSIS — G89.29 CHRONIC MIDLINE LOW BACK PAIN WITH RIGHT-SIDED SCIATICA: ICD-10-CM

## 2022-04-06 DIAGNOSIS — M54.41 CHRONIC MIDLINE LOW BACK PAIN WITH RIGHT-SIDED SCIATICA: Primary | ICD-10-CM

## 2022-04-06 PROCEDURE — 73502 X-RAY EXAM HIP UNI 2-3 VIEWS: CPT | Mod: LT | Performed by: RADIOLOGY

## 2022-04-06 PROCEDURE — 99214 OFFICE O/P EST MOD 30 MIN: CPT | Performed by: INTERNAL MEDICINE

## 2022-04-06 PROCEDURE — 72100 X-RAY EXAM L-S SPINE 2/3 VWS: CPT | Performed by: RADIOLOGY

## 2022-04-06 RX ORDER — LISINOPRIL 40 MG/1
TABLET ORAL
Qty: 90 TABLET | Refills: 0 | Status: SHIPPED | OUTPATIENT
Start: 2022-04-06 | End: 2022-08-15

## 2022-04-06 NOTE — Clinical Note
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  After his visit I had a chance to review more of his record, including the [facility] note from 10/29/2021 where it was documented that a plan for addressing the painful partial hip arthroplasty was discussed and that he was to follow-up with his surgeon after having specific imaging completed.  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    Niles Cedillo MD  4/6/2022 10:09 PM

## 2022-04-06 NOTE — TELEPHONE ENCOUNTER
Prescription approved per [facility] Refill Protocol.    Mirella GARCIA RN  EP Triage
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
## 2022-04-07 RX ORDER — LEVOTHYROXINE SODIUM 137 UG/1
137 TABLET ORAL DAILY
Qty: 90 TABLET | Refills: 1 | Status: SHIPPED | OUTPATIENT
Start: 2022-04-07 | End: 2022-10-24

## 2022-04-07 NOTE — PROGRESS NOTES
Called pt and discussed MD message below. Pt understands to call [facility] for his follow up plan.    Taylor MCKENNA RN Specialty Triage 4/7/2022 11:01 AM

## 2022-04-07 NOTE — TELEPHONE ENCOUNTER
"Requested Prescriptions   Signed Prescriptions Disp Refills    levothyroxine (SYNTHROID/LEVOTHROID) 137 MCG tablet 90 tablet 1     Sig: TAKE 1 TABLET (137 MCG) BY MOUTH DAILY , STARTING ON 9/23/21       Thyroid Protocol Passed - 4/5/2022 10:18 AM        Passed - Patient is 12 years or older        Passed - Recent (12 mo) or future (30 days) visit within the authorizing provider's specialty     Patient has had an office visit with the authorizing provider or a provider within the authorizing providers department within the previous 12 mos or has a future within next 30 days. See \"Patient Info\" tab in inbasket, or \"Choose Columns\" in Meds & Orders section of the refill encounter.              Passed - Medication is active on med list        Passed - Normal TSH on file in past 12 months     Recent Labs   Lab Test 03/16/22  1016   TSH 2.44                 Requested Prescriptions   Signed Prescriptions Disp Refills    levothyroxine (SYNTHROID/LEVOTHROID) 137 MCG tablet 90 tablet 1     Sig: TAKE 1 TABLET (137 MCG) BY MOUTH DAILY , STARTING ON 9/23/21       Thyroid Protocol Passed - 4/5/2022 10:18 AM        Passed - Patient is 12 years or older        Passed - Recent (12 mo) or future (30 days) visit within the authorizing provider's specialty     Patient has had an office visit with the authorizing provider or a provider within the authorizing providers department within the previous 12 mos or has a future within next 30 days. See \"Patient Info\" tab in inbasket, or \"Choose Columns\" in Meds & Orders section of the refill encounter.              Passed - Medication is active on med list        Passed - Normal TSH on file in past 12 months     Recent Labs   Lab Test 03/16/22  1016   TSH 2.44                 "

## 2022-04-07 NOTE — PROGRESS NOTES
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  After his visit I had a chance to review more of his record, including the [facility] orthopedics note from 10/29/2021 where it was documented that a plan for addressing the painful partial hip arthroplasty was discussed and that he was to follow-up with his surgeon after having specific imaging completed.  Therefore, considering that he is having worsened left hip pain that is likely aggravating the degenerative arthritis of his lumbar spine, I recommend that the next step would be to see his orthopedic surgeon at [facility].    Niles Cedillo MD  4/6/2022 10:09 PM

## 2022-04-11 ENCOUNTER — HOSPITAL ENCOUNTER (OUTPATIENT)
Dept: NUCLEAR MEDICINE | Facility: CLINIC | Age: 77
Setting detail: NUCLEAR MEDICINE
Discharge: HOME | End: 2022-04-11
Attending: INTERNAL MEDICINE
Payer: MEDICARE

## 2022-04-11 ENCOUNTER — HOSPITAL ENCOUNTER (OUTPATIENT)
Facility: CLINIC | Age: 77
Discharge: HOME | End: 2022-04-11
Payer: MEDICARE

## 2022-04-11 VITALS
RESPIRATION RATE: 16 BRPM | SYSTOLIC BLOOD PRESSURE: 146 MMHG | HEART RATE: 68 BPM | DIASTOLIC BLOOD PRESSURE: 73 MMHG | OXYGEN SATURATION: 94 % | TEMPERATURE: 97.2 F

## 2022-04-11 DIAGNOSIS — E89.0 POSTSURGICAL HYPOTHYROIDISM: ICD-10-CM

## 2022-04-11 DIAGNOSIS — C73 PAPILLARY THYROID CARCINOMA (H): ICD-10-CM

## 2022-04-11 PROCEDURE — G1004 CDSM NDSC: HCPCS

## 2022-04-11 PROCEDURE — 96372 THER/PROPH/DIAG INJ SC/IM: CPT

## 2022-04-11 PROCEDURE — 250N000011 HC RX IP 250 OP 636

## 2022-04-11 PROCEDURE — 999N000154 HC STATISTIC RADIOLOGY XRAY, US, CT, MAR, NM

## 2022-04-11 RX ADMIN — THYROTROPIN ALFA 0.9 MG: 0.9 INJECTION, POWDER, FOR SOLUTION INTRAMUSCULAR at 13:25

## 2022-04-11 NOTE — PROGRESS NOTES
Care Suites Admission Nursing Note    Patient Information  Name: Harrison Thomas  Age: 76 year old  Reason for admission: Thyrogen injection  Care Suites arrival time: 1300    Visitor Information  Name: None    Patient Admission/Assessment   Pre-procedure assessment complete: Yes  If abnormal assessment/labs, provider notified: N/A  NPO: N/A  Medications held per instructions/orders: N/A  Consent: N/A  Patient oriented to room: Yes  Education/questions answered: Yes  Plan/other: Injection given    Discharge Planning  Overnight post sedation caregiver: N/A  Discharge location: home    PATIENT/VISITOR WELLNESS SCREENING    Step 1 Patient Screening    1. In the last month, have you been in contact with someone who was confirmed or suspected to have Coronavirus/COVID-19? No    2. Do you have the following symptoms?  Fever/Chills? No   Cough? No   Shortness of breath? No   New loss of taste or smell? No  Sore throat? No  Muscle or body aches? No  Headaches? No  Fatigue? No  Vomiting or diarrhea? No    Step 2 Visitor Screening    1. Name of Visitor (1 visitor per patient): None    Heydi Sanchez RN

## 2022-04-12 ENCOUNTER — HOSPITAL ENCOUNTER (OUTPATIENT)
Facility: CLINIC | Age: 77
Discharge: HOME | End: 2022-04-12
Admitting: RADIOLOGY
Payer: MEDICARE

## 2022-04-12 ENCOUNTER — HOSPITAL ENCOUNTER (OUTPATIENT)
Dept: NUCLEAR MEDICINE | Facility: CLINIC | Age: 77
Setting detail: NUCLEAR MEDICINE
Discharge: HOME | End: 2022-04-12
Attending: INTERNAL MEDICINE
Payer: MEDICARE

## 2022-04-12 VITALS
SYSTOLIC BLOOD PRESSURE: 146 MMHG | DIASTOLIC BLOOD PRESSURE: 74 MMHG | WEIGHT: 180 LBS | OXYGEN SATURATION: 95 % | TEMPERATURE: 97.5 F | RESPIRATION RATE: 16 BRPM | HEART RATE: 61 BPM | BODY MASS INDEX: 25.77 KG/M2 | HEIGHT: 70 IN

## 2022-04-12 PROCEDURE — 96372 THER/PROPH/DIAG INJ SC/IM: CPT | Performed by: RADIOLOGY

## 2022-04-12 PROCEDURE — 999N000154 HC STATISTIC RADIOLOGY XRAY, US, CT, MAR, NM

## 2022-04-12 PROCEDURE — 250N000011 HC RX IP 250 OP 636: Performed by: RADIOLOGY

## 2022-04-12 RX ADMIN — THYROTROPIN ALFA 0.9 MG: 0.9 INJECTION, POWDER, FOR SOLUTION INTRAMUSCULAR at 13:51

## 2022-04-12 NOTE — PROGRESS NOTES
Care Suites Procedure Nursing Note (Thyrogen injection)    Patient Information  Name: Harrison Thomas  Age: 76 year old    Procedure  Procedure: Thyrogen injection given at 13:51   Concerns/abnormal assessment: None.  Patient tolerated well.    Monitored for twenty minutes.  Asymptomatic.    Discharge Education:  Discharge instructions reviewed: Yes  Patient/patient representative verbalizes understanding: Yes  Patient discharging on new medications: No  Medication education completed: Yes    Discharge Plans:   Discharge location: home  Approximate discharge time: 14:12    Discharge Criteria:  Discharge criteria met: Yes

## 2022-04-13 ENCOUNTER — HOSPITAL ENCOUNTER (OUTPATIENT)
Dept: NUCLEAR MEDICINE | Facility: CLINIC | Age: 77
Setting detail: NUCLEAR MEDICINE
Discharge: HOME | End: 2022-04-13
Attending: INTERNAL MEDICINE
Payer: MEDICARE

## 2022-04-13 PROCEDURE — 343N000001 HC RX 343: Performed by: INTERNAL MEDICINE

## 2022-04-13 PROCEDURE — A9528 IODINE I-131 IODIDE CAP, DX: HCPCS | Performed by: INTERNAL MEDICINE

## 2022-04-13 RX ADMIN — Medication 4.3 MILLICURIE: at 12:52

## 2022-04-15 ENCOUNTER — HOSPITAL ENCOUNTER (OUTPATIENT)
Dept: NUCLEAR MEDICINE | Facility: CLINIC | Age: 77
Setting detail: NUCLEAR MEDICINE
Discharge: HOME | End: 2022-04-15
Attending: INTERNAL MEDICINE
Payer: MEDICARE

## 2022-04-16 ENCOUNTER — HEALTH MAINTENANCE LETTER (OUTPATIENT)
Age: 77
End: 2022-04-16

## 2022-04-22 ENCOUNTER — TRANSFERRED RECORDS (OUTPATIENT)
Dept: HEALTH INFORMATION MANAGEMENT | Facility: CLINIC | Age: 77
End: 2022-04-22
Payer: MEDICARE

## 2022-04-27 ASSESSMENT — PATIENT HEALTH QUESTIONNAIRE - PHQ9: SUM OF ALL RESPONSES TO PHQ QUESTIONS 1-9: 12

## 2022-05-02 ENCOUNTER — OFFICE VISIT (OUTPATIENT)
Dept: FAMILY MEDICINE | Facility: CLINIC | Age: 77
End: 2022-05-02
Payer: MEDICARE

## 2022-05-02 VITALS
SYSTOLIC BLOOD PRESSURE: 164 MMHG | DIASTOLIC BLOOD PRESSURE: 84 MMHG | OXYGEN SATURATION: 94 % | TEMPERATURE: 96.9 F | HEART RATE: 52 BPM | BODY MASS INDEX: 25.83 KG/M2 | WEIGHT: 180 LBS | RESPIRATION RATE: 20 BRPM

## 2022-05-02 DIAGNOSIS — F43.21 SITUATIONAL DEPRESSION: ICD-10-CM

## 2022-05-02 DIAGNOSIS — I10 BENIGN ESSENTIAL HYPERTENSION: ICD-10-CM

## 2022-05-02 DIAGNOSIS — Z96.642 STATUS POST LEFT HIP REPLACEMENT: ICD-10-CM

## 2022-05-02 DIAGNOSIS — E78.2 MIXED HYPERLIPIDEMIA: ICD-10-CM

## 2022-05-02 DIAGNOSIS — R91.8 LUNG MASS: ICD-10-CM

## 2022-05-02 DIAGNOSIS — R26.89 POOR BALANCE: ICD-10-CM

## 2022-05-02 DIAGNOSIS — F11.90 CHRONIC, CONTINUOUS USE OF OPIOIDS: ICD-10-CM

## 2022-05-02 DIAGNOSIS — E89.0 H/O TOTAL THYROIDECTOMY: ICD-10-CM

## 2022-05-02 DIAGNOSIS — D84.9 IMMUNODEFICIENCY (H): ICD-10-CM

## 2022-05-02 DIAGNOSIS — M48.062 SPINAL STENOSIS OF LUMBAR REGION WITH NEUROGENIC CLAUDICATION: ICD-10-CM

## 2022-05-02 DIAGNOSIS — J44.1 CHRONIC OBSTRUCTIVE PULMONARY DISEASE WITH (ACUTE) EXACERBATION (H): ICD-10-CM

## 2022-05-02 DIAGNOSIS — Z00.00 ENCOUNTER FOR ANNUAL WELLNESS VISIT (AWV) IN MEDICARE PATIENT: Primary | ICD-10-CM

## 2022-05-02 DIAGNOSIS — M05.79 RHEUMATOID ARTHRITIS INVOLVING MULTIPLE SITES WITH POSITIVE RHEUMATOID FACTOR (H): ICD-10-CM

## 2022-05-02 DIAGNOSIS — I10 ESSENTIAL HYPERTENSION, BENIGN: ICD-10-CM

## 2022-05-02 DIAGNOSIS — G47.33 OSA (OBSTRUCTIVE SLEEP APNEA): ICD-10-CM

## 2022-05-02 LAB
CHOLEST SERPL-MCNC: 164 MG/DL
FASTING STATUS PATIENT QL REPORTED: YES
HDLC SERPL-MCNC: 46 MG/DL
LDLC SERPL CALC-MCNC: 94 MG/DL
NONHDLC SERPL-MCNC: 118 MG/DL
TRIGL SERPL-MCNC: 121 MG/DL

## 2022-05-02 PROCEDURE — 99214 OFFICE O/P EST MOD 30 MIN: CPT | Mod: 25 | Performed by: INTERNAL MEDICINE

## 2022-05-02 PROCEDURE — 36415 COLL VENOUS BLD VENIPUNCTURE: CPT | Performed by: INTERNAL MEDICINE

## 2022-05-02 PROCEDURE — 80061 LIPID PANEL: CPT | Performed by: INTERNAL MEDICINE

## 2022-05-02 PROCEDURE — G0402 INITIAL PREVENTIVE EXAM: HCPCS | Performed by: INTERNAL MEDICINE

## 2022-05-02 RX ORDER — DULOXETIN HYDROCHLORIDE 20 MG/1
20 CAPSULE, DELAYED RELEASE ORAL DAILY
Qty: 90 CAPSULE | Refills: 3 | Status: SHIPPED | OUTPATIENT
Start: 2022-05-02 | End: 2022-05-02

## 2022-05-02 RX ORDER — DULOXETIN HYDROCHLORIDE 20 MG/1
20 CAPSULE, DELAYED RELEASE ORAL DAILY
Qty: 90 CAPSULE | Refills: 3 | Status: SHIPPED | OUTPATIENT
Start: 2022-05-02 | End: 2022-06-14

## 2022-05-02 RX ORDER — HYDROCODONE BITARTRATE AND ACETAMINOPHEN 5; 325 MG/1; MG/1
1 TABLET ORAL EVERY 6 HOURS PRN
Qty: 180 TABLET | Refills: 0 | Status: SHIPPED | OUTPATIENT
Start: 2022-05-02 | End: 2022-06-14

## 2022-05-02 RX ORDER — AMLODIPINE BESYLATE 5 MG/1
5 TABLET ORAL 2 TIMES DAILY
Qty: 180 TABLET | Refills: 3 | Status: SHIPPED | OUTPATIENT
Start: 2022-05-02 | End: 2022-12-16

## 2022-05-02 ASSESSMENT — PATIENT HEALTH QUESTIONNAIRE - PHQ9
SUM OF ALL RESPONSES TO PHQ QUESTIONS 1-9: 12
SUM OF ALL RESPONSES TO PHQ QUESTIONS 1-9: 12
10. IF YOU CHECKED OFF ANY PROBLEMS, HOW DIFFICULT HAVE THESE PROBLEMS MADE IT FOR YOU TO DO YOUR WORK, TAKE CARE OF THINGS AT HOME, OR GET ALONG WITH OTHER PEOPLE: SOMEWHAT DIFFICULT

## 2022-05-02 ASSESSMENT — PAIN SCALES - GENERAL: PAINLEVEL: EXTREME PAIN (8)

## 2022-05-02 ASSESSMENT — ACTIVITIES OF DAILY LIVING (ADL): CURRENT_FUNCTION: NO ASSISTANCE NEEDED

## 2022-05-02 NOTE — Clinical Note
, can you please see if Harrison can go through radiation. There is no pathology , but mass is increasing.  I would really appreciate if you bless it before he goes through it. Yaneli
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
## 2022-05-03 ENCOUNTER — MYC MEDICAL ADVICE (OUTPATIENT)
Dept: FAMILY MEDICINE | Facility: CLINIC | Age: 77
End: 2022-05-03
Payer: MEDICARE

## 2022-05-03 DIAGNOSIS — I10 ESSENTIAL HYPERTENSION, BENIGN: Primary | ICD-10-CM

## 2022-05-05 RX ORDER — FUROSEMIDE 20 MG
10 TABLET ORAL DAILY
Qty: 31 TABLET | Refills: 1 | Status: SHIPPED | OUTPATIENT
Start: 2022-05-05 | End: 2022-06-14

## 2022-05-05 NOTE — TELEPHONE ENCOUNTER
OSMANI Mathis, please see updated blood pressure with amlodipine  changes.     Allie Robles RN  [facility]

## 2022-05-18 ENCOUNTER — TRANSFERRED RECORDS (OUTPATIENT)
Dept: HEALTH INFORMATION MANAGEMENT | Facility: CLINIC | Age: 77
End: 2022-05-18
Payer: MEDICARE

## 2022-05-24 ENCOUNTER — HOSPITAL ENCOUNTER (OUTPATIENT)
Dept: PHYSICAL THERAPY | Facility: CLINIC | Age: 77
Discharge: HOME | End: 2022-05-24
Attending: INTERNAL MEDICINE
Payer: MEDICARE

## 2022-05-24 DIAGNOSIS — R26.89 POOR BALANCE: Primary | ICD-10-CM

## 2022-05-24 DIAGNOSIS — R29.898 LEG WEAKNESS, BILATERAL: ICD-10-CM

## 2022-05-24 DIAGNOSIS — Z96.642 STATUS POST LEFT HIP REPLACEMENT: ICD-10-CM

## 2022-05-24 PROCEDURE — 97110 THERAPEUTIC EXERCISES: CPT | Mod: GP

## 2022-05-24 PROCEDURE — 97162 PT EVAL MOD COMPLEX 30 MIN: CPT | Mod: GP

## 2022-05-24 ASSESSMENT — ENCOUNTER SYMPTOMS
SKIN CHANGES: 0
NAIL CHANGES: 0
TINGLING: 0
SNORES LOUDLY: 1
STIFFNESS: 1
DISTURBANCES IN COORDINATION: 1
NECK PAIN: 1
HEADACHES: 0
MYALGIAS: 1
MUSCLE WEAKNESS: 1
BRUISES/BLEEDS EASILY: 1
SEIZURES: 0
WEAKNESS: 1
DECREASED CONCENTRATION: 0
ARTHRALGIAS: 1
MEMORY LOSS: 0
SWOLLEN GLANDS: 0
MUSCLE CRAMPS: 0
PANIC: 0
DEPRESSION: 0
SPEECH CHANGE: 0
COUGH DISTURBING SLEEP: 0
JOINT SWELLING: 1
SPUTUM PRODUCTION: 0
WHEEZING: 1
DYSPNEA ON EXERTION: 1
TREMORS: 0
POOR WOUND HEALING: 0
SHORTNESS OF BREATH: 1
COUGH: 0
PARALYSIS: 0
LOSS OF CONSCIOUSNESS: 0
NERVOUS/ANXIOUS: 0
DIZZINESS: 1
BACK PAIN: 1
POSTURAL DYSPNEA: 1
INSOMNIA: 1
NUMBNESS: 0
HEMOPTYSIS: 0

## 2022-05-24 NOTE — PROGRESS NOTES
05/24/22 1423   Signing Clinician's Name / Credentials   Signing clinician's name / credentials Lita Hubbard, PT, DPT   Functional Gait Assessment (CHANTELLE Begum, Theodora GAnahy F., et al. (2004))   1. GAIT LEVEL SURFACE 2   2. CHANGE IN GAIT SPEED 2  (not signficant changes)   3. GAIT WITH HORIZONTAL HEAD TURNS 2   4. GAIT WITH VERTICAL HEAD TURNS 2   5. GAIT AND PIVOT TURN 2  (slow)   6. STEP OVER OBSTACLE 2   7. GAIT WITH NARROW BASE OF SUPPORT 0   8. GAIT WITH EYES CLOSED 1  (very slow)   9. AMBULATING BACKWARDS 2  (slow speed)   10. STEPS 2   Total Functional Gait Assessment Score   TOTAL SCORE: (MAXIMUM SCORE 30) 17

## 2022-05-24 NOTE — PROGRESS NOTES
[facility]                                                                                   OUTPATIENT PHYSICAL THERAPY FUNCTIONAL EVALUATION  PLAN OF TREATMENT FOR OUTPATIENT REHABILITATION  (COMPLETE FOR INITIAL CLAIMS ONLY)  Patient's Last Name, First Name, M.I.  YOB: 1945  WilliamHarrison CASANOVA     Provider's Name   [facility]   Medical Record No.  [id number]     Start of Care Date:  05/24/22   Onset Date:  05/02/22 (date of order)   Type:     _X__PT   ____OT  ____SLP Medical Diagnosis:  Poor balance  Status post left hip replacement     PT Diagnosis:  impaired gait and balance with back and hip pain Visits from SOC:  1                              __________________________________________________________________________________  Plan of Treatment/Functional Goals:  ADL retraining, balance training, gait training, neuromuscular re-education, ROM, strengthening, stretching           GOALS  Standing Balance  Patient will increase mCTSIB to 30 seconds with first 3 conditions for improved stability and safety completed standing tasks throughout home and improved safety in shower.  08/22/22    Walking Balance  Patient to improve on FGA to 22/30 to display significant improvement in dynamic balance for safety and decreased fall risk with home and community ambulation.  08/22/22    Floor Transfer  Patient to improve to be able to complete a floor transfer independently and safely with appropriate level of external support for improved functional balance and strength for navigating environment and completion of yard tasks.  08/22/22    Functional Strength  Patient to improve with 30 second sit to stand to 5 repetitions to display improved LE endurance for greater tolerance and safety with completion of transfers from chairs, bed and in/out of  cars.  08/22/22    HEP  Patient to independently complete regular exercise program with correct mechanics in order to safely manage impairments upon discharge from skilled therapy.  08/22/22    Gait speed  Patient to improve gait speed to >1.02 m/s in order to display improved safety with home and community ambulation for completion of errands and family participation.  08/22/22        Therapy Frequency:  2 times/Week (decrease frequency as indicated in POC)   Predicted Duration of Therapy Intervention:  up to 90 days    Lita Hubbard, SANCHEZ                                    I CERTIFY THE NEED FOR THESE SERVICES FURNISHED UNDER        THIS PLAN OF TREATMENT AND WHILE UNDER MY CARE     (Physician co-signature of this document indicates review and certification of the therapy plan).                Certification Date From:  05/24/22   Certification Date To:  08/22/22    Referring Provider:  Yaneli Mathis MD    Initial Assessment  See Epic Evaluation- Start of Care Date: 05/24/22

## 2022-05-24 NOTE — PROGRESS NOTES
05/24/22 1340   Quick Adds   Quick Adds Certification   Type of Visit Initial OP PT Evaluation   General Information   Start of Care Date 05/24/22   Referring Physician Yaneli Mathis MD   Orders Evaluate and Treat as Indicated   Additional Orders OT   Order Date 05/02/22   Medical Diagnosis Poor balance  Status post left hip replacement   Onset of illness/injury or Date of Surgery 05/02/22  (date of order)   Precautions/Limitations fall precautions   Surgical/Medical history reviewed Yes   Pertinent history of current problem (include personal factors and/or comorbidities that impact the POC) Patient reports to OP PT with increased balance problems noted at recent PCP visit. Patient recently underwent radiation therapy in presense of thyroid cancer s/p thyroidectomy 2021, new expanding lung mass not able to be biopsied. Patient s/p L hip replacement in 2021 and hernia surgery 2021. PMH significant for RA, COPD, chronic opioid use, immunodeficiency, HTN, stenosis, HLD, depression, BRENNEN. Patient reports not being able to complete rehab for hip replacement (only went to one session) due to additonal surgeries and medical attention needed over 2021 for cancer and hernia. Significant deconditioning occurred over year with multiple medical issues. One fall over past year getting up from chair. Reports difficulty in shower with eyes closed. 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 Multi-level home - stairs going okay, needs to use rail   ADL Devices Shower/Tub Chair   Patient/Family Goals Statement would like to be able to walk normal and get  up/down from ground   Fall Risk Screen   Fall screen completed by PT   Have you fallen 2 or more times in the past year? No   Have you fallen and had an injury in the past year? Yes   Is patient a fall risk? Yes;Department fall risk interventions implemented   Fall screen comments FGA 17/30   Abuse Screen (yes response referral indicated)   Feels Unsafe at Home or Work/School no   Feels Threatened by Someone no   Does Anyone Try to Keep You From Having Contact with Others or Doing Things Outside Your Home? no   Physical Signs of Abuse Present no   Pain   Patient currently in pain Yes   Pain rating 4/10   Pain comments LBP and down R leg, currently has back pain which patient reports as constant. No current relieving factors, worsens with twisting. Has opioids that patient reports to only take when pain gets really bad   Cognitive Status Examination   Cognitive Comment alert, able to follow multi-step commands   Posture   Posture Comments B elevated shoulders, protracted shoulders and kyphotic posture   Range of Motion (ROM)   ROM Comment Gross stiffness noted in B hips with AROM and PROM but WFL, R knee lacking to full extension   Strength   Strength Comments LE MMT: hip flexion R 4+/5 L 3+/f, knee extension, knee flexion, ankle DF all B 5/5, hip abduction R 3+/5, L 3-/5   Transfer Skills   Transfer Comments uses B UE use for sit<>stand   Gait   Gait Comments Ambulates with B toe out posture, reverse trendelenburg over R hip, slight R hip circumduction with decreased foot clearance, WBOS   Gait Special Tests 25 Foot Timed Walk   Seconds 8.31   Comments 0.92 m/s without AD   Gait Special Tests Functional Gait Assessment Score out of 30   Score out of 30 17   Balance Special Tests Modified CTSIB Conditions   Condition 1, seconds 30 Seconds   Condition 2, seconds 6 Seconds   Condition 4, seconds 10.39 Seconds   Condition 5, seconds 0 Seconds   Balance Special Tests Sit to Stand Reps in 30 Seconds   Reps in 30 seconds 1    Height 18   Comments decreased control with sitting   Modality Interventions   Planned Modality Interventions Comments Per therapist discretion   Planned Therapy Interventions   Planned Therapy Interventions ADL retraining;balance training;gait training;neuromuscular re-education;ROM;strengthening;stretching   Clinical Impression   Criteria for Skilled Therapeutic Interventions Met yes, treatment indicated   PT Diagnosis impaired gait and balance with back and hip pain   Influenced by the following impairments proximal and LE weakness, static and dynamic balance impairments with increased falls risk, spinal and LE mobility defecits with pain, activity toelrance   Functional limitations due to impairments gait, stairs, transfers   Clinical Presentation Evolving/Changing   Clinical Presentation Rationale body system involvement, PMH   Clinical Decision Making (Complexity) Moderate complexity   Therapy Frequency 2 times/Week  (decrease frequency as indicated in POC)   Predicted Duration of Therapy Intervention (days/wks) up to 90 days   Risk & Benefits of therapy have been explained Yes   Patient, Family & other staff in agreement with plan of care Yes   Clinical Impression Comments Patient presents with significant gait dysfunction and weakness in presense of previous L hip replacement without rehabilitation and subsequent multiple medical complications and surgeries with signicant deconditioning. Patient also with significant static and dynamic impairments indicating level of sensory integration impairments. Patient would benefit from skilled therapy to address impairments and allow patient to progress towards PLOF.   Goal 1   Goal Identifier Standing Balance   Goal Description Patient will increase mCTSIB to 30 seconds with first 3 conditions for improved stability and safety completed standing tasks throughout home and improved safety in shower.   Goal Progress evaluation: condition 1 30 seconds, condition 2 6  seconds, condition 3 10 seconds, condition 4 0 seconds   Target Date 08/22/22   Goal 2   Goal Identifier Walking Balance   Goal Description Patient to improve on FGA to 22/30 to display significant improvement in dynamic balance for safety and decreased fall risk with home and community ambulation.   Goal Progress evaluation: 17/30   Target Date 08/22/22   Goal 3   Goal Identifier Floor Transfer   Goal Description Patient to improve to be able to complete a floor transfer independently and safely with appropriate level of external support for improved functional balance and strength for navigating environment and completion of yard tasks.   Goal Progress evaluation: patient reports inability to get onto ground   Target Date 08/22/22   Goal 4   Goal Identifier Functional Strength   Goal Description Patient to improve with 30 second sit to stand to 5 repetitions to display improved LE endurance for greater tolerance and safety with completion of transfers from chairs, bed and in/out of cars.   Goal Progress evaluation: 1 rep   Target Date 08/22/22   Goal 5   Goal Identifier HEP   Goal Description Patient to independently complete regular exercise program with correct mechanics in order to safely manage impairments upon discharge from skilled therapy.   Goal Progress evaluation: not currently exercising   Target Date 08/22/22   Goal 6   Goal Identifier Gait speed   Goal Description Patient to improve gait speed to >1.02 m/s in order to display improved safety with home and community ambulation for completion of errands and family participation.   Goal Progress baseline: 0.92 m/s   Target Date 08/22/22   Total Evaluation Time   PT Andria, Moderate Complexity Minutes (28320) 40   Therapy Certification   Certification date from 05/24/22   Certification date to 08/22/22   Medical Diagnosis Poor balance  Status post left hip replacement   Certification I certify the need for these services furnished under this plan of  treatment and while under my care.  (Physician co-signature of this document indicates review and certification of the therapy plan).

## 2022-05-27 ENCOUNTER — OFFICE VISIT (OUTPATIENT)
Dept: PULMONOLOGY | Facility: CLINIC | Age: 77
End: 2022-05-27
Attending: INTERNAL MEDICINE
Payer: MEDICARE

## 2022-05-27 VITALS
WEIGHT: 180 LBS | DIASTOLIC BLOOD PRESSURE: 77 MMHG | BODY MASS INDEX: 25.77 KG/M2 | SYSTOLIC BLOOD PRESSURE: 141 MMHG | HEIGHT: 70 IN | OXYGEN SATURATION: 94 % | HEART RATE: 54 BPM

## 2022-05-27 DIAGNOSIS — Z78.9 INTOLERANCE OF CONTINUOUS POSITIVE AIRWAY PRESSURE (CPAP) VENTILATION: ICD-10-CM

## 2022-05-27 DIAGNOSIS — G47.33 OBSTRUCTIVE SLEEP APNEA: Primary | ICD-10-CM

## 2022-05-27 DIAGNOSIS — J43.8 OTHER EMPHYSEMA (H): ICD-10-CM

## 2022-05-27 DIAGNOSIS — R91.1 LUNG NODULE: ICD-10-CM

## 2022-05-27 DIAGNOSIS — J42 CHRONIC BRONCHITIS, UNSPECIFIED CHRONIC BRONCHITIS TYPE (H): ICD-10-CM

## 2022-05-27 PROCEDURE — G0463 HOSPITAL OUTPT CLINIC VISIT: HCPCS

## 2022-05-27 PROCEDURE — 99215 OFFICE O/P EST HI 40 MIN: CPT | Performed by: INTERNAL MEDICINE
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
## 2022-05-27 NOTE — NURSING NOTE
Chief Complaint   Patient presents with     Follow Up     5 month follow up      Vitals were taken and medications were reconciled.     Arina Sesay RMA  8:28 AM

## 2022-05-27 NOTE — PATIENT INSTRUCTIONS
Severe COPD  Pulmonary Nodule - presumed CA; now s/p Radiation  Thyroid Cancer  Sleep Apnea    Plan:  1)  Continue Trelegy each AM (with rinsing) and as needed rescue albuterol inhaler (up to 2 puffs every 3 hours)  2)  Has Xopenex nebulizer treatments at home but not used in a long time  3)  Continue Physical Therapy and gradually increase aerobic activity  4)  Consider using albuterol 10 minutes before significant exertion to be able to do more  5)  Flu shot this fall  6)  referral to Dr. ELLEN avina for possible [facility]IRE therapy for Sleep Apnea  7)  Return to clinic in 6-8 months    For problems or issues, please call our nurses (Candie Taylor Tamsine) at [phone]

## 2022-06-02 ENCOUNTER — MEDICAL CORRESPONDENCE (OUTPATIENT)
Dept: HEALTH INFORMATION MANAGEMENT | Facility: CLINIC | Age: 77
End: 2022-06-02

## 2022-06-07 ENCOUNTER — HOSPITAL ENCOUNTER (OUTPATIENT)
Dept: PHYSICAL THERAPY | Facility: CLINIC | Age: 77
Discharge: HOME | End: 2022-06-07
Payer: MEDICARE

## 2022-06-07 DIAGNOSIS — R29.898 LEG WEAKNESS, BILATERAL: ICD-10-CM

## 2022-06-07 DIAGNOSIS — R26.89 POOR BALANCE: Primary | ICD-10-CM

## 2022-06-07 PROCEDURE — 97116 GAIT TRAINING THERAPY: CPT | Mod: GP

## 2022-06-07 PROCEDURE — 97110 THERAPEUTIC EXERCISES: CPT | Mod: GP

## 2022-06-09 ENCOUNTER — HOSPITAL ENCOUNTER (OUTPATIENT)
Dept: PHYSICAL THERAPY | Facility: CLINIC | Age: 77
Discharge: HOME | End: 2022-06-09
Payer: MEDICARE

## 2022-06-09 DIAGNOSIS — R29.898 LEG WEAKNESS, BILATERAL: ICD-10-CM

## 2022-06-09 DIAGNOSIS — R26.89 POOR BALANCE: Primary | ICD-10-CM

## 2022-06-09 PROCEDURE — 97116 GAIT TRAINING THERAPY: CPT | Mod: GP

## 2022-06-09 PROCEDURE — 97110 THERAPEUTIC EXERCISES: CPT | Mod: GP

## 2022-06-10 ASSESSMENT — ANXIETY QUESTIONNAIRES
GAD7 TOTAL SCORE: 6
GAD7 TOTAL SCORE: 6
3. WORRYING TOO MUCH ABOUT DIFFERENT THINGS: SEVERAL DAYS
5. BEING SO RESTLESS THAT IT IS HARD TO SIT STILL: SEVERAL DAYS
1. FEELING NERVOUS, ANXIOUS, OR ON EDGE: SEVERAL DAYS
7. FEELING AFRAID AS IF SOMETHING AWFUL MIGHT HAPPEN: NOT AT ALL
7. FEELING AFRAID AS IF SOMETHING AWFUL MIGHT HAPPEN: NOT AT ALL
6. BECOMING EASILY ANNOYED OR IRRITABLE: SEVERAL DAYS
GAD7 TOTAL SCORE: 6
8. IF YOU CHECKED OFF ANY PROBLEMS, HOW DIFFICULT HAVE THESE MADE IT FOR YOU TO DO YOUR WORK, TAKE CARE OF THINGS AT HOME, OR GET ALONG WITH OTHER PEOPLE?: SOMEWHAT DIFFICULT
2. NOT BEING ABLE TO STOP OR CONTROL WORRYING: SEVERAL DAYS
4. TROUBLE RELAXING: SEVERAL DAYS

## 2022-06-14 ENCOUNTER — VIRTUAL VISIT (OUTPATIENT)
Dept: FAMILY MEDICINE | Facility: CLINIC | Age: 77
End: 2022-06-14
Payer: MEDICARE

## 2022-06-14 ENCOUNTER — HOSPITAL ENCOUNTER (OUTPATIENT)
Dept: PHYSICAL THERAPY | Facility: CLINIC | Age: 77
Discharge: HOME | End: 2022-06-14
Payer: MEDICARE

## 2022-06-14 DIAGNOSIS — F11.90 CHRONIC, CONTINUOUS USE OF OPIOIDS: ICD-10-CM

## 2022-06-14 DIAGNOSIS — J44.1 OBSTRUCTIVE CHRONIC BRONCHITIS WITH EXACERBATION (H): Primary | ICD-10-CM

## 2022-06-14 DIAGNOSIS — F43.21 SITUATIONAL DEPRESSION: ICD-10-CM

## 2022-06-14 DIAGNOSIS — R26.89 POOR BALANCE: Primary | ICD-10-CM

## 2022-06-14 DIAGNOSIS — I10 ESSENTIAL HYPERTENSION, BENIGN: ICD-10-CM

## 2022-06-14 DIAGNOSIS — R29.898 LEG WEAKNESS, BILATERAL: ICD-10-CM

## 2022-06-14 DIAGNOSIS — M48.062 SPINAL STENOSIS OF LUMBAR REGION WITH NEUROGENIC CLAUDICATION: ICD-10-CM

## 2022-06-14 PROCEDURE — 99441 PR PHYSICIAN TELEPHONE EVALUATION 5-10 MIN: CPT | Mod: 95 | Performed by: INTERNAL MEDICINE

## 2022-06-14 PROCEDURE — 97112 NEUROMUSCULAR REEDUCATION: CPT | Mod: GP

## 2022-06-14 PROCEDURE — 97110 THERAPEUTIC EXERCISES: CPT | Mod: GP

## 2022-06-14 RX ORDER — HYDROCODONE BITARTRATE AND ACETAMINOPHEN 5; 325 MG/1; MG/1
1 TABLET ORAL EVERY 6 HOURS PRN
Qty: 180 TABLET | Refills: 0 | Status: SHIPPED | OUTPATIENT
Start: 2022-06-14 | End: 2022-07-21

## 2022-06-14 RX ORDER — DULOXETIN HYDROCHLORIDE 20 MG/1
20 CAPSULE, DELAYED RELEASE ORAL DAILY
Qty: 180 CAPSULE | Refills: 3 | Status: SHIPPED | OUTPATIENT
Start: 2022-06-14 | End: 2022-11-15

## 2022-06-14 RX ORDER — FUROSEMIDE 20 MG
20 TABLET ORAL DAILY
Qty: 90 TABLET | Refills: 3 | Status: SHIPPED | OUTPATIENT
Start: 2022-06-14 | End: 2022-12-16

## 2022-06-14 ASSESSMENT — ANXIETY QUESTIONNAIRES: GAD7 TOTAL SCORE: 6

## 2022-06-14 NOTE — PROGRESS NOTES
Harrison is a 76 year old who is being evaluated via a billable telephone visit.      What phone number would you like to be contacted at? [phone]  How would you like to obtain your AVS? Kristin      ICD-10-CM    1. Obstructive chronic bronchitis with exacerbation (H)  J44.1 Fluticasone-Umeclidin-Vilanterol (TRELEGY ELLIPTA) 100-62.5-25 MCG/INH oral inhaler   2. Essential hypertension, benign  I10 furosemide (LASIX) 20 MG tablet   3. Chronic, continuous use of opioids  F11.90    4. Spinal stenosis of lumbar region with neurogenic claudication  M48.062 HYDROcodone-acetaminophen (NORCO) 5-325 MG tablet   5. Situational depression  F43.21 DULoxetine (CYMBALTA) 20 MG capsule     Very complex patient with known rheumatoid arthritis and recent finding of thyroid cancer and perhaps lung cancer  Has finished radiation  His COPD is stable and he is doing physical therapy for balance issues  He would like a Norco prescription and Cymbalta is helping with the depression and he does not want to increase the dose  I think those are reasonable treatment options   without increasing the Lasix, he gets edema and he would like to take 20 mg that is fine  His cardiac disease is stable also    Subjective   Harrison is a 76 year old who presents for the following health issues   Patient states his depression is improved  He finished radiation and is waiting on the repeat scan  Duloxetine is helping with depression but he does not want to increase the dose  Lower dose of Lasix because of edema  Overall he feels good  Is on balance and physical therapy    History of Present Illness       Reason for visit:  Phone visit    He eats 2-3 servings of fruits and vegetables daily.He consumes 1 sweetened beverage(s) daily.He exercises with enough effort to increase his heart rate 9 or less minutes per day.  He exercises with enough effort to increase his heart rate 3 or less days per week.   He is taking medications regularly.  Today's PAVAN-7  Score: 6       COPD Follow-Up    Overall, how are your COPD symptoms since your last clinic visit?  No change    How much fatigue or shortness of breath do you have when you are walking?  Same as usual    History   Smoking Status     Former Smoker     Packs/day: 1.50     Years: 30.00     Types: Cigarettes     Start date: 1/1/1996     Quit date: 1/1/1999   Smokeless Tobacco     Never Used     Comment: not  a smoker     No results found for: FEV1, IKN5EXY        Review of Systems   10 point ROS of systems including Constitutional, Eyes, Respiratory, Cardiovascular, Gastroenterology, Genitourinary, Integumentary, Muscularskeletal, Psychiatric were all negative except for pertinent positives noted in my HPI.        Objective    Vitals - Patient Reported  Systolic (Patient Reported): 115  Diastolic (Patient Reported): 54  Pulse (Patient Reported): 63  Pain Score: Extreme Pain (8)  Pain Loc:  (hip, low back)      Vitals:  No vitals were obtained today due to virtual visit.    Physical Exam   healthy, alert and no distress  PSYCH: Alert and oriented times 3; coherent speech, normal   rate and volume, able to articulate logical thoughts, able   to abstract reason, no tangential thoughts, no hallucinations   or delusions  His affect is normal  RESP: No cough, no audible wheezing, able to talk in full sentences  Remainder of exam unable to be completed due to telephone visits    Patient Active Problem List   Diagnosis     Essential hypertension, benign     Pain in joint, upper arm     Allergic rhinitis     Pain in joint, lower leg     Chronic airway obstruction (H)     Monoclonal paraproteinemia     Immunodeficiency (H)     Eczema     COPD (chronic obstructive pulmonary disease) (H)     Transient cerebral ischemia     Bunion     Occipital neuralgia     HYPERLIPIDEMIA LDL GOAL <100     Health Care Home     Low back pain     Advanced directives, counseling/discussion     Olecranon bursitis of right elbow     Bruit     Retina  priscilla     signed & scanned on 09/23/2011  (1-4-2013 printed but not scanned in)      Chronic, continuous use of opioids     Atherosclerosis of native coronary artery of native heart without angina pectoris     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, unspecified thyrotoxicosis type     Right-sided low back pain with right-sided sciatica     SOB (shortness of breath)     Coronary artery disease involving native coronary artery of native heart without angina pectoris     BRENNEN (obstructive sleep apnea)     BPH (benign prostatic hyperplasia)     Rheumatoid arthritis (H)     Hammer toe of right foot     Hallux limitus of right foot     Corn of toe     Non-recurrent unilateral inguinal hernia with obstruction without gangrene     Left inguinal hernia     Metastasis from thyroid cancer (H)     Nodule of upper lobe of right lung     Preoperative examination     Current Outpatient Medications   Medication     acetaminophen (TYLENOL) 325 MG tablet     albuterol (PROAIR HFA/PROVENTIL HFA/VENTOLIN HFA) 108 (90 Base) MCG/ACT inhaler     amLODIPine (NORVASC) 5 MG tablet     Ascorbic Acid (VITAMIN C PO)     aspirin 81 MG tablet     calcium carbonate (OS-KARLIE) 600 MG tablet     Carisoprodol 250 MG TABS     cholecalciferol 25 MCG (1000 UT) TABS     DULoxetine (CYMBALTA) 20 MG capsule     FISH OIL 1000 MG OR CAPS     Fluticasone-Umeclidin-Vilanterol (TRELEGY ELLIPTA) 100-62.5-25 MCG/INH oral inhaler     furosemide (LASIX) 20 MG tablet     guaiFENesin (MUCINEX) 600 MG 12 hr tablet     HYDROcodone-acetaminophen (NORCO) 5-325 MG tablet     levalbuterol (XOPENEX) 0.31 MG/3ML neb solution     levothyroxine (SYNTHROID/LEVOTHROID) 137 MCG tablet     Lidocaine (LIDOCARE) 4 % Patch     lisinopril (ZESTRIL) 40 MG tablet     metoprolol succinate ER (TOPROL-XL) 50 MG 24 hr tablet     multivitamin w/minerals (THERA-VIT-M) tablet     naloxone (NARCAN) 4 MG/0.1ML nasal spray     nitroGLYcerin (NITROSTAT) 0.4 MG sublingual tablet     rosuvastatin  (CRESTOR) 10 MG tablet     senna-docusate (SENOKOT-S/PERICOLACE) 8.6-50 MG tablet     sildenafil (VIAGRA) 100 MG tablet     No current facility-administered medications for this visit.                 Phone call duration: 8 minutes

## 2022-06-16 ENCOUNTER — HOSPITAL ENCOUNTER (OUTPATIENT)
Dept: PHYSICAL THERAPY | Facility: CLINIC | Age: 77
Discharge: HOME | End: 2022-06-16
Payer: MEDICARE

## 2022-06-16 DIAGNOSIS — R29.898 LEG WEAKNESS, BILATERAL: ICD-10-CM

## 2022-06-16 DIAGNOSIS — R26.89 POOR BALANCE: Primary | ICD-10-CM

## 2022-06-16 PROCEDURE — 97110 THERAPEUTIC EXERCISES: CPT | Mod: GP

## 2022-06-16 PROCEDURE — 97112 NEUROMUSCULAR REEDUCATION: CPT | Mod: GP

## 2022-06-29 ENCOUNTER — HOSPITAL ENCOUNTER (OUTPATIENT)
Dept: PHYSICAL THERAPY | Facility: CLINIC | Age: 77
Discharge: HOME | End: 2022-06-29
Payer: MEDICARE

## 2022-06-29 DIAGNOSIS — Z96.642 STATUS POST LEFT HIP REPLACEMENT: ICD-10-CM

## 2022-06-29 DIAGNOSIS — R29.898 LEG WEAKNESS, BILATERAL: ICD-10-CM

## 2022-06-29 DIAGNOSIS — R26.89 POOR BALANCE: Primary | ICD-10-CM

## 2022-06-29 PROCEDURE — 97110 THERAPEUTIC EXERCISES: CPT | Mod: GP

## 2022-07-01 ENCOUNTER — HOSPITAL ENCOUNTER (OUTPATIENT)
Dept: PHYSICAL THERAPY | Facility: CLINIC | Age: 77
Discharge: HOME | End: 2022-07-01
Payer: MEDICARE

## 2022-07-01 DIAGNOSIS — Z74.09 IMPAIRED FUNCTIONAL MOBILITY, BALANCE, AND ENDURANCE: Primary | ICD-10-CM

## 2022-07-01 PROCEDURE — 97112 NEUROMUSCULAR REEDUCATION: CPT | Mod: GP | Performed by: PHYSICAL THERAPIST

## 2022-07-05 ENCOUNTER — HOSPITAL ENCOUNTER (OUTPATIENT)
Dept: PHYSICAL THERAPY | Facility: CLINIC | Age: 77
Discharge: HOME | End: 2022-07-05
Payer: MEDICARE

## 2022-07-05 DIAGNOSIS — R26.89 POOR BALANCE: ICD-10-CM

## 2022-07-05 DIAGNOSIS — Z74.09 IMPAIRED FUNCTIONAL MOBILITY, BALANCE, AND ENDURANCE: Primary | ICD-10-CM

## 2022-07-05 PROCEDURE — 97530 THERAPEUTIC ACTIVITIES: CPT | Mod: GP

## 2022-07-05 PROCEDURE — 97110 THERAPEUTIC EXERCISES: CPT | Mod: GP

## 2022-07-12 ENCOUNTER — HOSPITAL ENCOUNTER (OUTPATIENT)
Dept: PHYSICAL THERAPY | Facility: CLINIC | Age: 77
Discharge: HOME | End: 2022-07-12
Payer: MEDICARE

## 2022-07-12 DIAGNOSIS — R26.89 POOR BALANCE: ICD-10-CM

## 2022-07-12 DIAGNOSIS — Z74.09 IMPAIRED FUNCTIONAL MOBILITY, BALANCE, AND ENDURANCE: Primary | ICD-10-CM

## 2022-07-12 PROCEDURE — 97112 NEUROMUSCULAR REEDUCATION: CPT | Mod: GP

## 2022-07-12 PROCEDURE — 97110 THERAPEUTIC EXERCISES: CPT | Mod: GP

## 2022-07-19 ENCOUNTER — HOSPITAL ENCOUNTER (OUTPATIENT)
Dept: PHYSICAL THERAPY | Facility: CLINIC | Age: 77
Discharge: HOME | End: 2022-07-19
Payer: MEDICARE

## 2022-07-19 DIAGNOSIS — R26.89 POOR BALANCE: ICD-10-CM

## 2022-07-19 DIAGNOSIS — Z74.09 IMPAIRED FUNCTIONAL MOBILITY, BALANCE, AND ENDURANCE: Primary | ICD-10-CM

## 2022-07-19 PROCEDURE — 97112 NEUROMUSCULAR REEDUCATION: CPT | Mod: GP

## 2022-07-19 PROCEDURE — 97750 PHYSICAL PERFORMANCE TEST: CPT | Mod: GP

## 2022-07-19 NOTE — PROGRESS NOTES
[facility]                                                                                   OUTPATIENT PHYSICAL THERAPY FUNCTIONAL EVALUATION  PLAN OF TREATMENT FOR OUTPATIENT REHABILITATION  (COMPLETE FOR INITIAL CLAIMS ONLY)  Patient's Last Name, First Name, M.I.  YOB: 1945  WilliamHarrison CASANOVA     Provider's Name   [facility]   Medical Record No.  [id number]     Start of Care Date:  05/24/22   Onset Date:  05/02/22 (date of order)   Type:     _X__PT   ____OT  ____SLP Medical Diagnosis:  Poor balance  Status post left hip replacement     PT Diagnosis:  impaired gait and balance with back and hip pain Visits from SOC:  1                              __________________________________________________________________________________  Plan of Treatment/Functional Goals:  ADL retraining, balance training, gait training, neuromuscular re-education, ROM, strengthening, stretching           GOALS  Standing Balance  Patient will increase mCTSIB to 30 seconds with first 3 conditions for improved stability and safety completed standing tasks throughout home and improved safety in shower.  08/22/22    Walking Balance  Patient to improve on FGA to 22/30 to display significant improvement in dynamic balance for safety and decreased fall risk with home and community ambulation.  08/22/22    Floor Transfer  Patient to improve to be able to complete a floor transfer independently and safely with appropriate level of external support for improved functional balance and strength for navigating environment and completion of yard tasks.  08/22/22    Functional Strength  Patient to improve with 30 second sit to stand to 5 repetitions to display improved LE endurance for greater tolerance and safety with completion of transfers from chairs, bed and in/out of  cars.  08/22/22    HEP  Patient to independently complete regular exercise program with correct mechanics in order to safely manage impairments upon discharge from skilled therapy.  08/22/22    Gait speed  Patient to improve gait speed to >1.02 m/s in order to display improved safety with home and community ambulation for completion of errands and family participation.  08/22/22              Therapy Frequency:  2 times/Week (decrease frequency as indicated in POC)   Predicted Duration of Therapy Intervention:  up to 90 days    Lita Hubbard, SANCHEZ                                    I CERTIFY THE NEED FOR THESE SERVICES FURNISHED UNDER        THIS PLAN OF TREATMENT AND WHILE UNDER MY CARE     (Physician co-signature of this document indicates review and certification of the therapy plan).                Certification Date From:  05/24/22   Certification Date To:  08/22/22    Referring Provider:  Yaneli Mathis MD    Initial Assessment  See Epic Evaluation- Start of Care Date: 05/24/22

## 2022-07-19 NOTE — PROGRESS NOTES
[facility]    Outpatient Physical Therapy Progress Note  Patient: Harrison Thomas  : 1945    Beginning/End Dates of Reporting Period:  2022 to 2022    Referring Provider: Yaneli Mathis MD    Therapy Diagnosis: impaired gait and balance with back and hip pain     Client Self Report: Reports having a busy week. No large updates over the week.    Objective Measurements:  Objective Measure: Gait Speed  Details: 1.03 m/s withouth AD  Objective Measure: FGA  Details: 21  Objective Measure: 30 second sit to stand  Details: 5 rep  Objective Measure: mCTSIB  Details: condition 1: 30 seconds, condition 2: 8.56 seconds, condition 4: 23 seconds, condition 5: 3 seconds       Goals:  Goal Identifier Standing Balance   Goal Description Patient will increase mCTSIB to 30 seconds with first 3 conditions for improved stability and safety completed standing tasks throughout home and improved safety in shower.   Target Date 22   Date Met      Progress (detail required for progress note): IN PROGRESS: Increased time on conditions 2, 3, and 4 comared to evaluation at condition 1 30 seconds, condition 2 6 seconds, condition 3 10 seconds, condition 4 0 seconds     Goal Identifier Walking Balance   Goal Description Patient to improve on FGA to  to display significant improvement in dynamic balance for safety and decreased fall risk with home and community ambulation.   Target Date 22   Date Met      Progress (detail required for progress note): IN PRROGRESS: Progressed to  from      Goal Identifier Floor Transfer   Goal Description Patient to improve to be able to complete a floor transfer independently and safely with appropriate level of external support for improved functional balance and strength for navigating environment and completion of yard tasks.   Target Date 22   Date  Met      Progress (detail required for progress note): IN PROGRESS: Progressed to ability to complete floor transfer with heavy B UE support and SBA. Still not able to complete in home.     Goal Identifier Functional Strength   Goal Description Patient to improve with 30 second sit to stand to 5 repetitions to display improved LE endurance for greater tolerance and safety with completion of transfers from chairs, bed and in/out of cars.   Target Date 08/22/22   Date Met  07/19/22   Progress (detail required for progress note): Progressed to 5 reps from 1 rep a eval, goal met     Goal Identifier HEP   Goal Description Patient to independently complete regular exercise program with correct mechanics in order to safely manage impairments upon discharge from skilled therapy.   Target Date 08/22/22   Date Met      Progress (detail required for progress note): Patient completing HEP 2-4 days per week with good mechanics     Goal Identifier Gait speed   Goal Description Patient to improve gait speed to >1.02 m/s in order to display improved safety with home and community ambulation for completion of errands and family participation.   Target Date 08/22/22   Date Met  07/19/22   Progress (detail required for progress note): Progressed to 1.03 m/s from 0.92 m/s       Plan:  Continue therapy per current plan of care.    Discharge:  No

## 2022-07-19 NOTE — PROGRESS NOTES
07/19/22 1041   Signing Clinician's Name / Credentials   Signing clinician's name / credentials Lita Hubbard, PT, DPT   Functional Gait Assessment (CHANTELLE Begum, Theodora GAnahy F., et al. (2004))   1. GAIT LEVEL SURFACE 2   2. CHANGE IN GAIT SPEED 3   3. GAIT WITH HORIZONTAL HEAD TURNS 2   4. GAIT WITH VERTICAL HEAD TURNS 3   5. GAIT AND PIVOT TURN 3   6. STEP OVER OBSTACLE 2   7. GAIT WITH NARROW BASE OF SUPPORT 0   8. GAIT WITH EYES CLOSED 2   9. AMBULATING BACKWARDS 2   10. STEPS 2   Total Functional Gait Assessment Score   TOTAL SCORE: (MAXIMUM SCORE 30) 21

## 2022-07-21 DIAGNOSIS — M48.062 SPINAL STENOSIS OF LUMBAR REGION WITH NEUROGENIC CLAUDICATION: ICD-10-CM

## 2022-07-21 RX ORDER — HYDROCODONE BITARTRATE AND ACETAMINOPHEN 5; 325 MG/1; MG/1
1 TABLET ORAL EVERY 6 HOURS PRN
Qty: 180 TABLET | Refills: 0 | Status: SHIPPED | OUTPATIENT
Start: 2022-07-21 | End: 2022-09-20

## 2022-07-21 NOTE — TELEPHONE ENCOUNTER
Patient calling in to request refill of medication     Pended for review    Feliciano Dawkins RN

## 2022-07-22 ENCOUNTER — HOSPITAL ENCOUNTER (OUTPATIENT)
Dept: PHYSICAL THERAPY | Facility: CLINIC | Age: 77
Discharge: HOME | End: 2022-07-22
Payer: MEDICARE

## 2022-07-22 DIAGNOSIS — R26.89 POOR BALANCE: ICD-10-CM

## 2022-07-22 DIAGNOSIS — Z74.09 IMPAIRED FUNCTIONAL MOBILITY, BALANCE, AND ENDURANCE: Primary | ICD-10-CM

## 2022-07-22 PROCEDURE — 97112 NEUROMUSCULAR REEDUCATION: CPT | Mod: GP | Performed by: PHYSICAL THERAPIST

## 2022-07-22 PROCEDURE — 97110 THERAPEUTIC EXERCISES: CPT | Mod: GP | Performed by: PHYSICAL THERAPIST

## 2022-07-27 ENCOUNTER — HOSPITAL ENCOUNTER (OUTPATIENT)
Dept: PHYSICAL THERAPY | Facility: CLINIC | Age: 77
Discharge: HOME | End: 2022-07-27
Payer: MEDICARE

## 2022-07-27 DIAGNOSIS — R26.89 POOR BALANCE: ICD-10-CM

## 2022-07-27 DIAGNOSIS — Z74.09 IMPAIRED FUNCTIONAL MOBILITY, BALANCE, AND ENDURANCE: Primary | ICD-10-CM

## 2022-07-27 PROCEDURE — 97110 THERAPEUTIC EXERCISES: CPT | Mod: GP

## 2022-08-09 ENCOUNTER — HOSPITAL ENCOUNTER (OUTPATIENT)
Dept: PHYSICAL THERAPY | Facility: CLINIC | Age: 77
Discharge: HOME | End: 2022-08-09
Payer: MEDICARE

## 2022-08-09 DIAGNOSIS — R26.89 POOR BALANCE: ICD-10-CM

## 2022-08-09 DIAGNOSIS — Z74.09 IMPAIRED FUNCTIONAL MOBILITY, BALANCE, AND ENDURANCE: Primary | ICD-10-CM

## 2022-08-09 PROCEDURE — 97112 NEUROMUSCULAR REEDUCATION: CPT | Mod: GP

## 2022-08-09 PROCEDURE — 97110 THERAPEUTIC EXERCISES: CPT | Mod: GP

## 2022-08-10 ENCOUNTER — OFFICE VISIT (OUTPATIENT)
Dept: RHEUMATOLOGY | Facility: CLINIC | Age: 77
End: 2022-08-10
Payer: MEDICARE

## 2022-08-10 VITALS
DIASTOLIC BLOOD PRESSURE: 49 MMHG | OXYGEN SATURATION: 95 % | WEIGHT: 179.4 LBS | HEART RATE: 56 BPM | SYSTOLIC BLOOD PRESSURE: 112 MMHG | BODY MASS INDEX: 25.74 KG/M2

## 2022-08-10 DIAGNOSIS — M05.79 RHEUMATOID ARTHRITIS INVOLVING MULTIPLE SITES WITH POSITIVE RHEUMATOID FACTOR (H): Primary | ICD-10-CM

## 2022-08-10 PROCEDURE — 99213 OFFICE O/P EST LOW 20 MIN: CPT | Performed by: INTERNAL MEDICINE

## 2022-08-10 NOTE — PATIENT INSTRUCTIONS
RHEUMATOLOGY    Dr. Niles Cedillo    [address] [place] [address]  Phone number: [phone]  Fax number: [phone]      Thank you for choosing [facility]!    Kinjal Quinn CMA Rheumatology

## 2022-08-11 ENCOUNTER — HOSPITAL ENCOUNTER (OUTPATIENT)
Dept: PHYSICAL THERAPY | Facility: CLINIC | Age: 77
Discharge: HOME | End: 2022-08-11
Payer: MEDICARE

## 2022-08-11 DIAGNOSIS — Z74.09 IMPAIRED FUNCTIONAL MOBILITY, BALANCE, AND ENDURANCE: Primary | ICD-10-CM

## 2022-08-11 DIAGNOSIS — R26.89 POOR BALANCE: ICD-10-CM

## 2022-08-11 PROCEDURE — 97112 NEUROMUSCULAR REEDUCATION: CPT | Mod: GP

## 2022-08-13 DIAGNOSIS — I10 ESSENTIAL (PRIMARY) HYPERTENSION: ICD-10-CM

## 2022-08-13 DIAGNOSIS — I10 BENIGN ESSENTIAL HYPERTENSION: ICD-10-CM

## 2022-08-15 ENCOUNTER — HOSPITAL ENCOUNTER (OUTPATIENT)
Dept: PHYSICAL THERAPY | Facility: CLINIC | Age: 77
Discharge: HOME | End: 2022-08-15
Payer: MEDICARE

## 2022-08-15 DIAGNOSIS — R26.89 POOR BALANCE: ICD-10-CM

## 2022-08-15 DIAGNOSIS — Z74.09 IMPAIRED FUNCTIONAL MOBILITY, BALANCE, AND ENDURANCE: Primary | ICD-10-CM

## 2022-08-15 PROCEDURE — 97530 THERAPEUTIC ACTIVITIES: CPT | Mod: GP

## 2022-08-15 PROCEDURE — 97112 NEUROMUSCULAR REEDUCATION: CPT | Mod: GP

## 2022-08-15 RX ORDER — LISINOPRIL 40 MG/1
TABLET ORAL
Qty: 90 TABLET | Refills: 0 | Status: SHIPPED | OUTPATIENT
Start: 2022-08-15 | End: 2022-11-14

## 2022-08-15 RX ORDER — METOPROLOL SUCCINATE 50 MG/1
TABLET, EXTENDED RELEASE ORAL
Qty: 90 TABLET | Refills: 2 | Status: SHIPPED | OUTPATIENT
Start: 2022-08-15 | End: 2022-12-16

## 2022-08-15 NOTE — PROGRESS NOTES
08/15/22 1034   Signing Clinician's Name / Credentials   Signing clinician's name / credentials Lita Hubbard, PT, DPT   Functional Gait Assessment (CHANTELLE Begum, Theodora GAnahy F., et al. (2004))   1. GAIT LEVEL SURFACE 2   2. CHANGE IN GAIT SPEED 3   3. GAIT WITH HORIZONTAL HEAD TURNS 2   4. GAIT WITH VERTICAL HEAD TURNS 3   5. GAIT AND PIVOT TURN 3   6. STEP OVER OBSTACLE 3   7. GAIT WITH NARROW BASE OF SUPPORT 0   8. GAIT WITH EYES CLOSED 1   9. AMBULATING BACKWARDS 2   10. STEPS 2   Total Functional Gait Assessment Score   TOTAL SCORE: (MAXIMUM SCORE 30) 21

## 2022-08-15 NOTE — PROGRESS NOTES
[facility]    OUTPATIENT PHYSICAL THERAPY  PLAN OF TREATMENT FOR OUTPATIENT REHABILITATION AND PROGRESS NOTE           Patient's Last Name, First Name, Harrison Johnson Date of Birth  1945   Provider's Name  [facility] Medical Record No.  [id number]    Onset Date  5/2/2022 Start of Care Date  5/24/2022   Type:     _X_PT   ___OT   ___SLP Medical Diagnosis   Poor balance  Status post left hip replacement   PT Diagnosis  impaired gait and balance with back and hip pain Plan of Treatment  Frequency/Duration: every other week   Certification date from 8/23/2022 to 11/21/2022     Goals:  Goal Identifier Standing Balance   Goal Description Patient will increase mCTSIB to 30 seconds with first 3 conditions for improved stability and safety completed standing tasks throughout home and improved safety in shower.   Target Date 08/22/22   Date Met  08/15/22   Progress (detail required for progress note): Progressed to 30 seconds on first 3 conditions and 14 seconds on final condition     Goal Identifier Walking Balance   Goal Description Patient to improve on FGA to 22/30 to display significant improvement in dynamic balance for safety and decreased fall risk with home and community ambulation.   Target Date 11/21/2022 - goal extended   Date Met      Progress (detail required for progress note): Patient remains at 21/30 which is same as last assessment, progressed from 17/30 at initial evaluation     Goal Identifier Floor Transfer   Goal Description Patient to improve to be able to complete a floor transfer independently and safely with appropriate level of external support for improved functional balance and strength for navigating environment and completion of yard tasks.   Target Date 11/21/2022 - goal extended   Date Met      Progress (detail required for  progress note): Patient displaying improvement in ease of transfer. Completed x2 rep independently but with heavy use of external support. Still not able to complete in home.     Goal Identifier Functional Strength   Goal Description Patient to improve with 30 second sit to stand to 5 repetitions to display improved LE endurance for greater tolerance and safety with completion of transfers from chairs, bed and in/out of cars.   Target Date 08/22/22   Date Met  07/19/22   Progress (detail required for progress note): Progressed to 5 reps from 1 rep a eval, goal met     Goal Identifier HEP   Goal Description Patient to independently complete regular exercise program with correct mechanics in order to safely manage impairments upon discharge from skilled therapy.   Target Date 11/21/2022 - goal extended   Date Met      Progress (detail required for progress note): Patient reporting ongoing home completion and confidence with exercises. Cuing needed for proper mechanics when reviewed in session.     Goal Identifier Gait speed   Goal Description Patient to improve gait speed to >1.02 m/s in order to display improved safety with home and community ambulation for completion of errands and family participation.   Target Date 08/22/22   Date Met  07/19/22   Progress (detail required for progress note): Progressed to 1.09 m/s from 0.92 m/s         Beginning/End Dates of Progress Note Reporting Period:  5/24/2022 to 8/22/2022    Progress Toward Goals:   Progress this reporting period: Patient has displayed significant progress towards all goals over this reporting period. Patient continues to be limited by walking balance and floor transfers which is impacted by ongoing balance, ROM, strength, and aerobic endurance limitations. Patient with ongoing ROM limitation into L internal rotation which is evident with ambulation and floor transfers. Patient continues to display progress and would benefit from additional skilled therapy  to address ongoing impairments impacting functional movement to progress towards greater independence with all functional movement.     Client Self (Subjective) Report for Progress Note Reporting Period: Reports no new concerns    Objective Measurements:   Objective Measure: Gait Speed  Details: 1.09 m/s  Objective Measure: FGA  Details: 21  Objective Measure: 30 seconds sit to stance  Details: 7 rep  Objective Measure: mCTSIB  Details: condition 1: 30 seconds, condition 2: 30 seconds, condition 4: 30 seconds, condition 5: 14 seconds           I CERTIFY THE NEED FOR THESE SERVICES FURNISHED UNDER        THIS PLAN OF TREATMENT AND WHILE UNDER MY CARE     (Physician co-signature of this document indicates review and certification of the therapy plan).                Referring Provider: Yaneli Mathis MD Alicia Bellefeuille, PT

## 2022-08-31 ENCOUNTER — TRANSFERRED RECORDS (OUTPATIENT)
Dept: FAMILY MEDICINE | Facility: CLINIC | Age: 77
End: 2022-08-31

## 2022-08-31 ENCOUNTER — ANCILLARY PROCEDURE (OUTPATIENT)
Dept: CT IMAGING | Facility: CLINIC | Age: 77
End: 2022-08-31
Attending: THORACIC SURGERY (CARDIOTHORACIC VASCULAR SURGERY)
Payer: MEDICARE

## 2022-08-31 DIAGNOSIS — R91.1 LUNG NODULE: ICD-10-CM

## 2022-08-31 PROCEDURE — 71250 CT THORAX DX C-: CPT

## 2022-09-06 ENCOUNTER — TELEPHONE (OUTPATIENT)
Dept: PULMONOLOGY | Facility: CLINIC | Age: 77
End: 2022-09-06

## 2022-09-06 NOTE — TELEPHONE ENCOUNTER
Patient called in requesting hard copy prescription of Trelegy to be sent to him for a prescription assistance program. Most current Trelegy prescription was filled by PCP Dr. Dozier. Writer suggested getting prescription from Dr. Dozier as that is the most current prescriber. Patient will call back if he has any issues getting the hard copy from his PCP.

## 2022-09-07 ENCOUNTER — OFFICE VISIT (OUTPATIENT)
Dept: ENDOCRINOLOGY | Facility: CLINIC | Age: 77
End: 2022-09-07
Payer: MEDICARE

## 2022-09-07 VITALS
BODY MASS INDEX: 26.1 KG/M2 | WEIGHT: 182.3 LBS | SYSTOLIC BLOOD PRESSURE: 127 MMHG | HEART RATE: 63 BPM | DIASTOLIC BLOOD PRESSURE: 71 MMHG | HEIGHT: 70 IN

## 2022-09-07 DIAGNOSIS — C73 PAPILLARY THYROID CARCINOMA (H): ICD-10-CM

## 2022-09-07 DIAGNOSIS — E89.0 POSTSURGICAL HYPOTHYROIDISM: Primary | ICD-10-CM

## 2022-09-07 PROCEDURE — 99213 OFFICE O/P EST LOW 20 MIN: CPT | Performed by: INTERNAL MEDICINE
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
## 2022-09-19 DIAGNOSIS — M48.062 SPINAL STENOSIS OF LUMBAR REGION WITH NEUROGENIC CLAUDICATION: ICD-10-CM

## 2022-09-19 NOTE — TELEPHONE ENCOUNTER
Medication Question or Refill    What medication are you calling about (include dose and sig)?: HYDROcodone-acetaminophen (NORCO) 5-325 MG tablet     Controlled Substance Agreement on file:   CSA -- Patient Level:    Controlled Substance Agreement - Opioid - Scan on 11/1/2021  1:44 PM: 11/1/21       Who prescribed the medication?: PCP    Do you need a refill? Yes:     When did you use the medication last? 09.18.22    Patient offered an appointment? No Next OV 12.05.22    Do you have any questions or concerns?  No    Preferred Pharmacy:   [facility]/pharmacy #[id number] [address] [address]  Phone: [phone] Fax: [phone]    Could we send this information to you in [facility] or would you prefer to receive a phone call?:   No preference   Okay to leave a detailed message?: Yes at Cell number on file:    Telephone Information:   Mobile [phone]

## 2022-09-20 RX ORDER — HYDROCODONE BITARTRATE AND ACETAMINOPHEN 5; 325 MG/1; MG/1
1 TABLET ORAL EVERY 6 HOURS PRN
Qty: 180 TABLET | Refills: 0 | Status: ON HOLD | OUTPATIENT
Start: 2022-09-20 | End: 2022-10-21

## 2022-09-22 ENCOUNTER — TRANSFERRED RECORDS (OUTPATIENT)
Dept: HEALTH INFORMATION MANAGEMENT | Facility: CLINIC | Age: 77
End: 2022-09-22

## 2022-09-23 ENCOUNTER — IMMUNIZATION (OUTPATIENT)
Dept: NURSING | Facility: CLINIC | Age: 77
End: 2022-09-23
Payer: MEDICARE

## 2022-09-23 PROCEDURE — 91312 COVID-19,PF,PFIZER BOOSTER BIVALENT: CPT

## 2022-09-23 PROCEDURE — G0008 ADMIN INFLUENZA VIRUS VAC: HCPCS | Mod: 59

## 2022-09-23 PROCEDURE — 90662 IIV NO PRSV INCREASED AG IM: CPT

## 2022-09-23 PROCEDURE — 0124A COVID-19,PF,PFIZER BOOSTER BIVALENT: CPT

## 2022-09-27 NOTE — TELEPHONE ENCOUNTER
Called Pt, Rx is ready at    [facility] 210mg [id number] subcutaneou at 105 mg in each arm.   Joe Nguyen 49        Lot   [id number]          Exp. 7/2024

## 2022-10-06 ENCOUNTER — TRANSFERRED RECORDS (OUTPATIENT)
Dept: HEALTH INFORMATION MANAGEMENT | Facility: CLINIC | Age: 77
End: 2022-10-06

## 2022-10-08 ENCOUNTER — LAB (OUTPATIENT)
Dept: LAB | Facility: CLINIC | Age: 77
End: 2022-10-08
Payer: MEDICARE

## 2022-10-08 DIAGNOSIS — Z96.649 HISTORY OF HIP REPLACEMENT: ICD-10-CM

## 2022-10-08 DIAGNOSIS — Z47.89 UNSPECIFIED ORTHOPEDIC AFTERCARE: Primary | ICD-10-CM

## 2022-10-08 DIAGNOSIS — Z47.89 ORTHOPEDIC AFTERCARE: ICD-10-CM

## 2022-10-08 LAB
BASOPHILS # BLD AUTO: 0 10E3/UL (ref 0–0.2)
BASOPHILS NFR BLD AUTO: 1 %
CRP SERPL-MCNC: 48.7 MG/L
EOSINOPHIL # BLD AUTO: 0.3 10E3/UL (ref 0–0.7)
EOSINOPHIL NFR BLD AUTO: 5 %
ERYTHROCYTE [DISTWIDTH] IN BLOOD BY AUTOMATED COUNT: 12.9 % (ref 10–15)
ERYTHROCYTE [SEDIMENTATION RATE] IN BLOOD BY WESTERGREN METHOD: 38 MM/HR (ref 0–20)
HCT VFR BLD AUTO: 41.6 % (ref 40–53)
HGB BLD-MCNC: 13.9 G/DL (ref 13.3–17.7)
IMM GRANULOCYTES # BLD: 0 10E3/UL
IMM GRANULOCYTES NFR BLD: 0 %
LYMPHOCYTES # BLD AUTO: 0.8 10E3/UL (ref 0.8–5.3)
LYMPHOCYTES NFR BLD AUTO: 12 %
MCH RBC QN AUTO: 30 PG (ref 26.5–33)
MCHC RBC AUTO-ENTMCNC: 33.4 G/DL (ref 31.5–36.5)
MCV RBC AUTO: 90 FL (ref 78–100)
MONOCYTES # BLD AUTO: 1.1 10E3/UL (ref 0–1.3)
MONOCYTES NFR BLD AUTO: 17 %
NEUTROPHILS # BLD AUTO: 4.2 10E3/UL (ref 1.6–8.3)
NEUTROPHILS NFR BLD AUTO: 66 %
PLATELET # BLD AUTO: 212 10E3/UL (ref 150–450)
RBC # BLD AUTO: 4.63 10E6/UL (ref 4.4–5.9)
WBC # BLD AUTO: 6.4 10E3/UL (ref 4–11)

## 2022-10-08 PROCEDURE — 86140 C-REACTIVE PROTEIN: CPT

## 2022-10-08 PROCEDURE — 85652 RBC SED RATE AUTOMATED: CPT

## 2022-10-08 PROCEDURE — 36415 COLL VENOUS BLD VENIPUNCTURE: CPT

## 2022-10-08 PROCEDURE — 85025 COMPLETE CBC W/AUTO DIFF WBC: CPT

## 2022-10-15 ENCOUNTER — APPOINTMENT (OUTPATIENT)
Dept: GENERAL RADIOLOGY | Facility: CLINIC | Age: 77
DRG: 190 | End: 2022-10-15
Attending: EMERGENCY MEDICINE
Payer: MEDICARE

## 2022-10-15 ENCOUNTER — APPOINTMENT (OUTPATIENT)
Dept: CT IMAGING | Facility: CLINIC | Age: 77
DRG: 190 | End: 2022-10-15
Attending: EMERGENCY MEDICINE
Payer: MEDICARE

## 2022-10-15 ENCOUNTER — HOSPITAL ENCOUNTER (INPATIENT)
Facility: CLINIC | Age: 77
LOS: 6 days | Discharge: HOME HEALTH | DRG: 190 | End: 2022-10-21
Attending: EMERGENCY MEDICINE | Admitting: INTERNAL MEDICINE
Payer: MEDICARE

## 2022-10-15 ENCOUNTER — APPOINTMENT (OUTPATIENT)
Dept: GENERAL RADIOLOGY | Facility: CLINIC | Age: 77
DRG: 190 | End: 2022-10-15
Attending: INTERNAL MEDICINE
Payer: MEDICARE
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
## 2022-10-15 DIAGNOSIS — J44.1 COPD EXACERBATION (H): ICD-10-CM

## 2022-10-15 DIAGNOSIS — C79.9 METASTASIS FROM THYROID CANCER (H): ICD-10-CM

## 2022-10-15 DIAGNOSIS — C73 METASTASIS FROM THYROID CANCER (H): ICD-10-CM

## 2022-10-15 DIAGNOSIS — J42 CHRONIC BRONCHITIS, UNSPECIFIED CHRONIC BRONCHITIS TYPE (H): Primary | ICD-10-CM

## 2022-10-15 DIAGNOSIS — J96.01 ACUTE RESPIRATORY FAILURE WITH HYPOXIA (H): ICD-10-CM

## 2022-10-15 DIAGNOSIS — Z76.89 HEALTH CARE HOME: ICD-10-CM

## 2022-10-15 DIAGNOSIS — M25.562 ARTHRALGIA OF LEFT LOWER LEG: ICD-10-CM
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
## 2022-10-15 DIAGNOSIS — M25.552 HIP PAIN, LEFT: ICD-10-CM

## 2022-10-15 DIAGNOSIS — D72.829 LEUKOCYTOSIS, UNSPECIFIED TYPE: ICD-10-CM

## 2022-10-15 DIAGNOSIS — J18.1 LEFT LOWER LOBE CONSOLIDATION (H): ICD-10-CM

## 2022-10-15 DIAGNOSIS — J18.1 RIGHT UPPER LOBE CONSOLIDATION (H): ICD-10-CM

## 2022-10-15 PROBLEM — J96.91 RESPIRATORY FAILURE WITH HYPOXIA (H): Status: ACTIVE | Noted: 2022-10-15

## 2022-10-15 LAB
ALBUMIN SERPL-MCNC: 2.7 G/DL (ref 3.4–5)
ALP SERPL-CCNC: 92 U/L (ref 40–150)
ALT SERPL W P-5'-P-CCNC: 28 U/L (ref 0–70)
ANION GAP SERPL CALCULATED.3IONS-SCNC: 13 MMOL/L (ref 3–14)
AST SERPL W P-5'-P-CCNC: 13 U/L (ref 0–45)
ATRIAL RATE - MUSE: 110 BPM
ATRIAL RATE - MUSE: 114 BPM
BASOPHILS # BLD AUTO: 0 10E3/UL (ref 0–0.2)
BASOPHILS NFR BLD AUTO: 0 %
BILIRUB DIRECT SERPL-MCNC: 0.2 MG/DL (ref 0–0.2)
BILIRUB SERPL-MCNC: 0.5 MG/DL (ref 0.2–1.3)
BUN SERPL-MCNC: 18 MG/DL (ref 7–30)
CALCIUM SERPL-MCNC: 9.2 MG/DL (ref 8.5–10.1)
CHLORIDE BLD-SCNC: 104 MMOL/L (ref 94–109)
CO2 SERPL-SCNC: 22 MMOL/L (ref 20–32)
CREAT SERPL-MCNC: 0.95 MG/DL (ref 0.66–1.25)
DIASTOLIC BLOOD PRESSURE - MUSE: NORMAL MMHG
DIASTOLIC BLOOD PRESSURE - MUSE: NORMAL MMHG
EOSINOPHIL # BLD AUTO: 0 10E3/UL (ref 0–0.7)
EOSINOPHIL NFR BLD AUTO: 0 %
ERYTHROCYTE [DISTWIDTH] IN BLOOD BY AUTOMATED COUNT: 12.8 % (ref 10–15)
FLUAV RNA SPEC QL NAA+PROBE: NEGATIVE
FLUBV RNA RESP QL NAA+PROBE: NEGATIVE
GFR SERPL CREATININE-BSD FRML MDRD: 83 ML/MIN/1.73M2
GLUCOSE BLD-MCNC: 140 MG/DL (ref 70–99)
HCO3 BLDV-SCNC: 25 MMOL/L (ref 21–28)
HCO3 BLDV-SCNC: 26 MMOL/L (ref 21–28)
HCT VFR BLD AUTO: 46.7 % (ref 40–53)
HGB BLD-MCNC: 15.4 G/DL (ref 13.3–17.7)
IMM GRANULOCYTES # BLD: 0.1 10E3/UL
IMM GRANULOCYTES NFR BLD: 1 %
INTERPRETATION ECG - MUSE: NORMAL
INTERPRETATION ECG - MUSE: NORMAL
LACTATE BLD-SCNC: 1.7 MMOL/L
LACTATE BLD-SCNC: 3.6 MMOL/L
LYMPHOCYTES # BLD AUTO: 0.4 10E3/UL (ref 0.8–5.3)
LYMPHOCYTES NFR BLD AUTO: 3 %
MCH RBC QN AUTO: 29.5 PG (ref 26.5–33)
MCHC RBC AUTO-ENTMCNC: 33 G/DL (ref 31.5–36.5)
MCV RBC AUTO: 90 FL (ref 78–100)
MONOCYTES # BLD AUTO: 1 10E3/UL (ref 0–1.3)
MONOCYTES NFR BLD AUTO: 6 %
NEUTROPHILS # BLD AUTO: 14.8 10E3/UL (ref 1.6–8.3)
NEUTROPHILS NFR BLD AUTO: 90 %
NRBC # BLD AUTO: 0 10E3/UL
NRBC BLD AUTO-RTO: 0 /100
NT-PROBNP SERPL-MCNC: 386 PG/ML (ref 0–900)
P AXIS - MUSE: 39 DEGREES
P AXIS - MUSE: 42 DEGREES
PCO2 BLDV: 44 MM HG (ref 40–50)
PCO2 BLDV: 48 MM HG (ref 40–50)
PH BLDV: 7.34 [PH] (ref 7.32–7.43)
PH BLDV: 7.35 [PH] (ref 7.32–7.43)
PLATELET # BLD AUTO: 203 10E3/UL (ref 150–450)
PO2 BLDV: 32 MM HG (ref 25–47)
PO2 BLDV: 35 MM HG (ref 25–47)
POTASSIUM BLD-SCNC: 4.2 MMOL/L (ref 3.4–5.3)
PR INTERVAL - MUSE: 162 MS
PR INTERVAL - MUSE: 178 MS
PROCALCITONIN SERPL-MCNC: 0.28 NG/ML
PROT SERPL-MCNC: 7 G/DL (ref 6.8–8.8)
QRS DURATION - MUSE: 78 MS
QRS DURATION - MUSE: 78 MS
QT - MUSE: 354 MS
QT - MUSE: 370 MS
QTC - MUSE: 479 MS
QTC - MUSE: 509 MS
R AXIS - MUSE: 52 DEGREES
R AXIS - MUSE: 57 DEGREES
RBC # BLD AUTO: 5.22 10E6/UL (ref 4.4–5.9)
RSV RNA SPEC NAA+PROBE: NEGATIVE
SAO2 % BLDV: 56 % (ref 94–100)
SAO2 % BLDV: 64 % (ref 94–100)
SARS-COV-2 RNA RESP QL NAA+PROBE: NEGATIVE
SODIUM SERPL-SCNC: 139 MMOL/L (ref 133–144)
SYSTOLIC BLOOD PRESSURE - MUSE: NORMAL MMHG
SYSTOLIC BLOOD PRESSURE - MUSE: NORMAL MMHG
T AXIS - MUSE: 46 DEGREES
T AXIS - MUSE: 56 DEGREES
TROPONIN I SERPL HS-MCNC: 72 NG/L
VENTRICULAR RATE- MUSE: 110 BPM
VENTRICULAR RATE- MUSE: 114 BPM
WBC # BLD AUTO: 16.4 10E3/UL (ref 4–11)

## 2022-10-15 PROCEDURE — 73502 X-RAY EXAM HIP UNI 2-3 VIEWS: CPT

## 2022-10-15 PROCEDURE — 36415 COLL VENOUS BLD VENIPUNCTURE: CPT | Performed by: INTERNAL MEDICINE

## 2022-10-15 PROCEDURE — 99292 CRITICAL CARE ADDL 30 MIN: CPT

## 2022-10-15 PROCEDURE — 250N000013 HC RX MED GY IP 250 OP 250 PS 637: Performed by: INTERNAL MEDICINE

## 2022-10-15 PROCEDURE — 84484 ASSAY OF TROPONIN QUANT: CPT | Performed by: EMERGENCY MEDICINE

## 2022-10-15 PROCEDURE — 83880 ASSAY OF NATRIURETIC PEPTIDE: CPT | Performed by: EMERGENCY MEDICINE

## 2022-10-15 PROCEDURE — C9803 HOPD COVID-19 SPEC COLLECT: HCPCS

## 2022-10-15 PROCEDURE — 250N000011 HC RX IP 250 OP 636: Performed by: EMERGENCY MEDICINE

## 2022-10-15 PROCEDURE — 87637 SARSCOV2&INF A&B&RSV AMP PRB: CPT | Performed by: EMERGENCY MEDICINE

## 2022-10-15 PROCEDURE — 85025 COMPLETE CBC W/AUTO DIFF WBC: CPT | Performed by: EMERGENCY MEDICINE

## 2022-10-15 PROCEDURE — 5A09357 ASSISTANCE WITH RESPIRATORY VENTILATION, LESS THAN 24 CONSECUTIVE HOURS, CONTINUOUS POSITIVE AIRWAY PRESSURE: ICD-10-PCS | Performed by: EMERGENCY MEDICINE

## 2022-10-15 PROCEDURE — 258N000003 HC RX IP 258 OP 636: Performed by: EMERGENCY MEDICINE

## 2022-10-15 PROCEDURE — 99291 CRITICAL CARE FIRST HOUR: CPT | Mod: 25

## 2022-10-15 PROCEDURE — 250N000013 HC RX MED GY IP 250 OP 250 PS 637: Performed by: EMERGENCY MEDICINE

## 2022-10-15 PROCEDURE — 71275 CT ANGIOGRAPHY CHEST: CPT | Mod: MA

## 2022-10-15 PROCEDURE — 82248 BILIRUBIN DIRECT: CPT | Performed by: INTERNAL MEDICINE

## 2022-10-15 PROCEDURE — 99223 1ST HOSP IP/OBS HIGH 75: CPT | Mod: AI | Performed by: INTERNAL MEDICINE

## 2022-10-15 PROCEDURE — 999N000157 HC STATISTIC RCP TIME EA 10 MIN

## 2022-10-15 PROCEDURE — 96361 HYDRATE IV INFUSION ADD-ON: CPT

## 2022-10-15 PROCEDURE — 120N000001 HC R&B MED SURG/OB

## 2022-10-15 PROCEDURE — 250N000012 HC RX MED GY IP 250 OP 636 PS 637: Performed by: INTERNAL MEDICINE

## 2022-10-15 PROCEDURE — 94640 AIRWAY INHALATION TREATMENT: CPT | Mod: 76

## 2022-10-15 PROCEDURE — 250N000009 HC RX 250: Performed by: INTERNAL MEDICINE

## 2022-10-15 PROCEDURE — 96374 THER/PROPH/DIAG INJ IV PUSH: CPT

## 2022-10-15 PROCEDURE — 250N000009 HC RX 250

## 2022-10-15 PROCEDURE — 82803 BLOOD GASES ANY COMBINATION: CPT

## 2022-10-15 PROCEDURE — 93005 ELECTROCARDIOGRAM TRACING: CPT

## 2022-10-15 PROCEDURE — 94660 CPAP INITIATION&MGMT: CPT

## 2022-10-15 PROCEDURE — 36415 COLL VENOUS BLD VENIPUNCTURE: CPT | Performed by: EMERGENCY MEDICINE

## 2022-10-15 PROCEDURE — 84145 PROCALCITONIN (PCT): CPT | Performed by: INTERNAL MEDICINE

## 2022-10-15 PROCEDURE — 73560 X-RAY EXAM OF KNEE 1 OR 2: CPT | Mod: LT

## 2022-10-15 PROCEDURE — 80053 COMPREHEN METABOLIC PANEL: CPT | Performed by: EMERGENCY MEDICINE

## 2022-10-15 PROCEDURE — 250N000009 HC RX 250: Performed by: EMERGENCY MEDICINE

## 2022-10-15 PROCEDURE — 94640 AIRWAY INHALATION TREATMENT: CPT

## 2022-10-15 PROCEDURE — 250N000013 HC RX MED GY IP 250 OP 250 PS 637: Performed by: HOSPITALIST

## 2022-10-15 RX ORDER — PREDNISONE 20 MG/1
40 TABLET ORAL DAILY
Status: DISCONTINUED | OUTPATIENT
Start: 2022-10-15 | End: 2022-10-15

## 2022-10-15 RX ORDER — ALBUTEROL SULFATE 90 UG/1
2 AEROSOL, METERED RESPIRATORY (INHALATION) EVERY 6 HOURS PRN
Status: DISCONTINUED | OUTPATIENT
Start: 2022-10-15 | End: 2022-10-21 | Stop reason: HOSPADM

## 2022-10-15 RX ORDER — NALOXONE HYDROCHLORIDE 0.4 MG/ML
0.2 INJECTION, SOLUTION INTRAMUSCULAR; INTRAVENOUS; SUBCUTANEOUS
Status: DISCONTINUED | OUTPATIENT
Start: 2022-10-15 | End: 2022-10-21 | Stop reason: HOSPADM

## 2022-10-15 RX ORDER — IPRATROPIUM BROMIDE AND ALBUTEROL SULFATE 2.5; .5 MG/3ML; MG/3ML
3 SOLUTION RESPIRATORY (INHALATION) ONCE
Status: COMPLETED | OUTPATIENT
Start: 2022-10-15 | End: 2022-10-15

## 2022-10-15 RX ORDER — METOPROLOL SUCCINATE 50 MG/1
50 TABLET, EXTENDED RELEASE ORAL DAILY
Status: DISCONTINUED | OUTPATIENT
Start: 2022-10-15 | End: 2022-10-21 | Stop reason: HOSPADM

## 2022-10-15 RX ORDER — POLYETHYLENE GLYCOL 3350 17 G/17G
17 POWDER, FOR SOLUTION ORAL DAILY PRN
Status: DISCONTINUED | OUTPATIENT
Start: 2022-10-15 | End: 2022-10-21 | Stop reason: HOSPADM

## 2022-10-15 RX ORDER — IPRATROPIUM BROMIDE AND ALBUTEROL SULFATE 2.5; .5 MG/3ML; MG/3ML
SOLUTION RESPIRATORY (INHALATION)
Status: COMPLETED
Start: 2022-10-15 | End: 2022-10-15

## 2022-10-15 RX ORDER — GUAIFENESIN 600 MG/1
600 TABLET, EXTENDED RELEASE ORAL 2 TIMES DAILY PRN
Status: DISCONTINUED | OUTPATIENT
Start: 2022-10-15 | End: 2022-10-21 | Stop reason: HOSPADM

## 2022-10-15 RX ORDER — ASPIRIN 81 MG/1
81 TABLET ORAL 2 TIMES DAILY
Status: DISCONTINUED | OUTPATIENT
Start: 2022-10-15 | End: 2022-10-21 | Stop reason: HOSPADM

## 2022-10-15 RX ORDER — ASPIRIN 81 MG/1
81 TABLET ORAL DAILY
Status: DISCONTINUED | OUTPATIENT
Start: 2022-10-15 | End: 2022-10-15

## 2022-10-15 RX ORDER — ALBUTEROL SULFATE 0.83 MG/ML
2.5 SOLUTION RESPIRATORY (INHALATION)
Status: DISCONTINUED | OUTPATIENT
Start: 2022-10-15 | End: 2022-10-21 | Stop reason: HOSPADM

## 2022-10-15 RX ORDER — ROSUVASTATIN CALCIUM 10 MG/1
10 TABLET, COATED ORAL EVERY EVENING
Status: DISCONTINUED | OUTPATIENT
Start: 2022-10-15 | End: 2022-10-21 | Stop reason: HOSPADM

## 2022-10-15 RX ORDER — ONDANSETRON 2 MG/ML
4 INJECTION INTRAMUSCULAR; INTRAVENOUS EVERY 6 HOURS PRN
Status: DISCONTINUED | OUTPATIENT
Start: 2022-10-15 | End: 2022-10-21 | Stop reason: HOSPADM

## 2022-10-15 RX ORDER — ACETAMINOPHEN 325 MG/1
650 TABLET ORAL EVERY 4 HOURS PRN
Status: DISCONTINUED | OUTPATIENT
Start: 2022-10-15 | End: 2022-10-18

## 2022-10-15 RX ORDER — AMOXICILLIN 250 MG
2 CAPSULE ORAL 2 TIMES DAILY PRN
Status: DISCONTINUED | OUTPATIENT
Start: 2022-10-15 | End: 2022-10-21 | Stop reason: HOSPADM

## 2022-10-15 RX ORDER — AMOXICILLIN 250 MG
1 CAPSULE ORAL 2 TIMES DAILY PRN
COMMUNITY
End: 2023-06-12

## 2022-10-15 RX ORDER — NALOXONE HYDROCHLORIDE 0.4 MG/ML
0.4 INJECTION, SOLUTION INTRAMUSCULAR; INTRAVENOUS; SUBCUTANEOUS
Status: DISCONTINUED | OUTPATIENT
Start: 2022-10-15 | End: 2022-10-21 | Stop reason: HOSPADM

## 2022-10-15 RX ORDER — LIDOCAINE 40 MG/G
CREAM TOPICAL
Status: DISCONTINUED | OUTPATIENT
Start: 2022-10-15 | End: 2022-10-21 | Stop reason: HOSPADM

## 2022-10-15 RX ORDER — METHYLPREDNISOLONE SODIUM SUCCINATE 125 MG/2ML
60 INJECTION, POWDER, LYOPHILIZED, FOR SOLUTION INTRAMUSCULAR; INTRAVENOUS ONCE
Status: COMPLETED | OUTPATIENT
Start: 2022-10-15 | End: 2022-10-15

## 2022-10-15 RX ORDER — METHYLPREDNISOLONE SODIUM SUCCINATE 125 MG/2ML
80 INJECTION, POWDER, LYOPHILIZED, FOR SOLUTION INTRAMUSCULAR; INTRAVENOUS ONCE
Status: COMPLETED | OUTPATIENT
Start: 2022-10-16 | End: 2022-10-16

## 2022-10-15 RX ORDER — AMOXICILLIN 250 MG
1 CAPSULE ORAL 2 TIMES DAILY PRN
Status: DISCONTINUED | OUTPATIENT
Start: 2022-10-15 | End: 2022-10-15

## 2022-10-15 RX ORDER — HYDROCODONE BITARTRATE AND ACETAMINOPHEN 5; 325 MG/1; MG/1
1-2 TABLET ORAL ONCE
Status: COMPLETED | OUTPATIENT
Start: 2022-10-15 | End: 2022-10-15

## 2022-10-15 RX ORDER — AMOXICILLIN 250 MG
1 CAPSULE ORAL 2 TIMES DAILY PRN
Status: DISCONTINUED | OUTPATIENT
Start: 2022-10-15 | End: 2022-10-21 | Stop reason: HOSPADM

## 2022-10-15 RX ORDER — DOXYCYCLINE 100 MG/1
100 CAPSULE ORAL 2 TIMES DAILY
Status: DISCONTINUED | OUTPATIENT
Start: 2022-10-15 | End: 2022-10-21 | Stop reason: HOSPADM

## 2022-10-15 RX ORDER — ONDANSETRON 4 MG/1
4 TABLET, ORALLY DISINTEGRATING ORAL EVERY 6 HOURS PRN
Status: DISCONTINUED | OUTPATIENT
Start: 2022-10-15 | End: 2022-10-21 | Stop reason: HOSPADM

## 2022-10-15 RX ORDER — IOPAMIDOL 755 MG/ML
67 INJECTION, SOLUTION INTRAVASCULAR ONCE
Status: COMPLETED | OUTPATIENT
Start: 2022-10-15 | End: 2022-10-15

## 2022-10-15 RX ORDER — AMLODIPINE BESYLATE 5 MG/1
5 TABLET ORAL 2 TIMES DAILY
Status: DISCONTINUED | OUTPATIENT
Start: 2022-10-15 | End: 2022-10-21 | Stop reason: HOSPADM

## 2022-10-15 RX ORDER — FUROSEMIDE 20 MG
20 TABLET ORAL DAILY
Status: DISCONTINUED | OUTPATIENT
Start: 2022-10-15 | End: 2022-10-21 | Stop reason: HOSPADM

## 2022-10-15 RX ORDER — IPRATROPIUM BROMIDE AND ALBUTEROL SULFATE 2.5; .5 MG/3ML; MG/3ML
3 SOLUTION RESPIRATORY (INHALATION) EVERY 4 HOURS
Status: DISCONTINUED | OUTPATIENT
Start: 2022-10-15 | End: 2022-10-16

## 2022-10-15 RX ORDER — HYDROCODONE BITARTRATE AND ACETAMINOPHEN 5; 325 MG/1; MG/1
1-2 TABLET ORAL EVERY 4 HOURS PRN
Status: DISCONTINUED | OUTPATIENT
Start: 2022-10-15 | End: 2022-10-18

## 2022-10-15 RX ORDER — LISINOPRIL 20 MG/1
40 TABLET ORAL DAILY
Status: DISCONTINUED | OUTPATIENT
Start: 2022-10-15 | End: 2022-10-21 | Stop reason: HOSPADM

## 2022-10-15 RX ADMIN — ASPIRIN 81 MG: 81 TABLET, COATED ORAL at 20:49

## 2022-10-15 RX ADMIN — IPRATROPIUM BROMIDE AND ALBUTEROL SULFATE 3 ML: .5; 3 SOLUTION RESPIRATORY (INHALATION) at 15:46

## 2022-10-15 RX ADMIN — SODIUM CHLORIDE 92 ML: 900 INJECTION INTRAVENOUS at 03:57

## 2022-10-15 RX ADMIN — METHYLPREDNISOLONE SODIUM SUCCINATE 62.5 MG: 125 INJECTION, POWDER, FOR SOLUTION INTRAMUSCULAR; INTRAVENOUS at 03:03

## 2022-10-15 RX ADMIN — SODIUM CHLORIDE 1000 ML: 9 INJECTION, SOLUTION INTRAVENOUS at 04:42

## 2022-10-15 RX ADMIN — DOXYCYCLINE HYCLATE 100 MG: 100 CAPSULE ORAL at 09:20

## 2022-10-15 RX ADMIN — IPRATROPIUM BROMIDE AND ALBUTEROL SULFATE 3 ML: .5; 3 SOLUTION RESPIRATORY (INHALATION) at 19:16

## 2022-10-15 RX ADMIN — ASPIRIN 81 MG: 81 TABLET, COATED ORAL at 09:24

## 2022-10-15 RX ADMIN — IPRATROPIUM BROMIDE AND ALBUTEROL SULFATE 3 ML: .5; 3 SOLUTION RESPIRATORY (INHALATION) at 06:48

## 2022-10-15 RX ADMIN — HYDROCODONE BITARTRATE AND ACETAMINOPHEN 1 TABLET: 5; 325 TABLET ORAL at 13:40

## 2022-10-15 RX ADMIN — HYDROCODONE BITARTRATE AND ACETAMINOPHEN 2 TABLET: 5; 325 TABLET ORAL at 21:52

## 2022-10-15 RX ADMIN — FUROSEMIDE 20 MG: 20 TABLET ORAL at 09:27

## 2022-10-15 RX ADMIN — IOPAMIDOL 67 ML: 755 INJECTION, SOLUTION INTRAVENOUS at 03:57

## 2022-10-15 RX ADMIN — LEVOTHYROXINE SODIUM 137 MCG: 112 TABLET ORAL at 09:27

## 2022-10-15 RX ADMIN — HYDROCODONE BITARTRATE AND ACETAMINOPHEN 2 TABLET: 5; 325 TABLET ORAL at 05:55

## 2022-10-15 RX ADMIN — LISINOPRIL 40 MG: 20 TABLET ORAL at 11:58

## 2022-10-15 RX ADMIN — AMLODIPINE BESYLATE 5 MG: 5 TABLET ORAL at 09:21

## 2022-10-15 RX ADMIN — DOXYCYCLINE HYCLATE 100 MG: 100 CAPSULE ORAL at 20:49

## 2022-10-15 RX ADMIN — PREDNISONE 40 MG: 20 TABLET ORAL at 09:21

## 2022-10-15 RX ADMIN — AMLODIPINE BESYLATE 5 MG: 5 TABLET ORAL at 20:49

## 2022-10-15 RX ADMIN — HYDROCODONE BITARTRATE AND ACETAMINOPHEN 1 TABLET: 5; 325 TABLET ORAL at 17:52

## 2022-10-15 RX ADMIN — METOPROLOL SUCCINATE 50 MG: 50 TABLET, EXTENDED RELEASE ORAL at 09:27

## 2022-10-15 RX ADMIN — IPRATROPIUM BROMIDE AND ALBUTEROL SULFATE 3 ML: .5; 3 SOLUTION RESPIRATORY (INHALATION) at 03:00

## 2022-10-15 RX ADMIN — ROSUVASTATIN CALCIUM 10 MG: 10 TABLET, FILM COATED ORAL at 20:49

## 2022-10-15 ASSESSMENT — ENCOUNTER SYMPTOMS
FEVER: 0
NECK PAIN: 0
SHORTNESS OF BREATH: 1
WOUND: 0
HEADACHES: 0
COUGH: 0
ARTHRALGIAS: 1

## 2022-10-15 ASSESSMENT — ACTIVITIES OF DAILY LIVING (ADL)
ADLS_ACUITY_SCORE: 31
ADLS_ACUITY_SCORE: 35
CHANGE_IN_FUNCTIONAL_STATUS_SINCE_ONSET_OF_CURRENT_ILLNESS/INJURY: NO
WALKING_OR_CLIMBING_STAIRS_DIFFICULTY: NO
TRANSFERRING: 0-->INDEPENDENT
ADLS_ACUITY_SCORE: 18
NUMBER_OF_TIMES_PATIENT_HAS_FALLEN_WITHIN_LAST_SIX_MONTHS: 2
ADLS_ACUITY_SCORE: 21
ADLS_ACUITY_SCORE: 18
TRANSFERRING: 0-->INDEPENDENT
TOILETING_ISSUES: NO
ADLS_ACUITY_SCORE: 35
ADLS_ACUITY_SCORE: 18
ADLS_ACUITY_SCORE: 18
CONCENTRATING,_REMEMBERING_OR_MAKING_DECISIONS_DIFFICULTY: NO
ADLS_ACUITY_SCORE: 31
DOING_ERRANDS_INDEPENDENTLY_DIFFICULTY: NO
WEAR_GLASSES_OR_BLIND: YES
HEARING_DIFFICULTY_OR_DEAF: NO
DIFFICULTY_EATING/SWALLOWING: NO
ADLS_ACUITY_SCORE: 35
VISION_MANAGEMENT: GLASSES
FALL_HISTORY_WITHIN_LAST_SIX_MONTHS: YES
DRESSING/BATHING_DIFFICULTY: NO
DIFFICULTY_COMMUNICATING: NO
ADLS_ACUITY_SCORE: 35

## 2022-10-15 NOTE — PROGRESS NOTES
Patient was admitted by my colleague, Dr. Calvert earlier this morning.  Briefly, this is a 77-year-old male with known COPD, RUL nodule s/p non-diagnostic VATS and empiric radiation, metastatic papillary thyroid cancer who presented with worsening shortness of breath and 2 falls at home.    On exam, he is alert and reclined in bed.  Currently on about 5 L nasal cannula oxygen.  Poor air movement bilaterally.  No wheezing heard.    Patient also complains of having had 2 falls on the day of presentation.  Denies dizziness, states that he has had issues with his left leg causing imbalance and falls.  Continues to endorse pain in his left hip and thigh.  Does have prior history of left LIZABETH few years ago.  On chart review, he does have history of chronic bilateral hip pain, worse on the left.  States that he recently followed up with [facility] and had an MRI.  Apparently was called and told that he may need an aspiration.    Plan  Severe COPD with exacerbation  Possible community-acquired pneumonia,?  Radiation pneumonitis  -Pulmonology has been consulted.  Appreciate their recommendations.  Currently on Solu-Medrol 1 Mg/kgdose for COPD exacerbation as well as possible radiation pneumonitis.  -Continue on duo nebs  -Trelegy discontinued per pulmonology    Left lower extremity pain  Prior hip/left LIZABETH  Consult placed to orthopedic surgery/[facility] for further recommendations regarding recent MRI results.  I am unable to access recent MRI or recent [facility] notes.  Patient did have x-rays of left knee and pelvis with left hip at admission with no acute findings.  PT consulted.
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
## 2022-10-15 NOTE — ED NOTES
[facility]  ED Nurse Handoff Report    ED Chief complaint: Shortness of Breath      ED Diagnosis:   Final diagnoses:   Acute respiratory failure with hypoxia (H)   COPD exacerbation (H)   Leukocytosis, unspecified type   Right upper lobe consolidation (H)   Left lower lobe consolidation (H)   Fall in home, initial encounter   Hip pain, left       Code Status: as per hospitalist.    Allergies:   Allergies   Allergen Reactions    Levaquin Difficulty breathing    Plavix [Clopidogrel Bisulfate] Itching    Atorvastatin Calcium Cramps     lipitor    Cats     Dogs     Hctz [Hydrochlorothiazide]      Rash on legs    Sulfasalazine Other (See Comments)     Stomach cramps        Patient Story: increasing shortness of breath at home for the last 3 days. He has been using his albuterol nebulizer's with some relief. This evening he woke up and was very short of breath and EMS was called. He was 66% on room air and immediately placed on Cpap. He was given a duoneb and albuterol by EMS. He has never been this short of breath before. He has not been on home oxygen for around a year. He also had a fall tonight and is having left leg pain from this. Hx. of right lung cancer not currently undergoing any treatment and COPD    Focused Assessment:  A&Ox4, hypoxic on RA, respirations labored. Skin dry, warm, color wnl. Placed on BiPaP at arrival.   Results for orders placed or performed during the hospital encounter of 10/15/22   CT Chest Pulmonary Embolism w Contrast     Status: None    Narrative    EXAM: CT CHEST PULMONARY EMBOLISM WITH CONTRAST  LOCATION: [facility]  DATE/TIME: 10/15/2022, 4:19 AM    INDICATION: Dyspnea, history of lung cancer, rule out PE, PNA, etc.  COMPARISON: 08/31/2022.  TECHNIQUE: CT chest pulmonary angiogram during arterial phase injection of IV contrast. Multiplanar reformats and MIP reconstructions were performed. Dose reduction techniques were used.   CONTRAST: 67 mL  Isovue 370.    FINDINGS:  ANGIOGRAM CHEST: Pulmonary arteries are normal caliber and negative for pulmonary emboli. Thoracic aorta is negative for dissection. No CT evidence of right heart strain.    LUNGS AND PLEURA: Previously described 1.5 x 1.2 cm right upper lobe nodule not seen due to surrounding consolidation which has progressed. In addition, there is new subpleural consolidation within the left lower lobe with new interstitial infiltrates in   both lungs. Underlying emphysematous change. Vascular wall thickening with some mucous plugging in the lower lobes.    MEDIASTINUM/AXILLAE: There is mediastinal lymphadenopathy which has progressed. Representative node in the subcarinal region measures 1.6 cm in short axis dimension in comparison to 0.9 cm.    CORONARY ARTERY CALCIFICATION: Severe.    UPPER ABDOMEN: Cirrhosis. Surgical changes in the right hepatic lobe. Gallstones.    MUSCULOSKELETAL: Demineralization with stable chronic midthoracic compression fracture.      Impression    IMPRESSION:  1.  No pulmonary embolism.    2.  Emphysematous change.    3.  Previously described 1.5 cm nodule in the right upper lobe is not seen due to surrounding consolidative changes which have progressed. In addition, there is new subpleural consolidation within the left lower lobe with tiny pleural effusion. Increased   interstitial changes throughout the lungs with underlying emphysematous change.    4.  Progression of mediastinal lymphadenopathy.    5.  Cirrhosis. Postsurgical changes in the right hepatic lobe.    6.  Cholelithiasis.     XR Pelvis w Hip Left 1 View     Status: None    Narrative    EXAM: XR PELVIS AND HIP LEFT 1 VIEW  LOCATION: [facility]  DATE/TIME: 10/15/2022 4:18 AM    INDICATION: trauma, hx hip surgery  COMPARISON: 04/06/2022      Impression    IMPRESSION: Prior left hip replacement. Prosthetic components appear intact and normally aligned. No fracture. Severe arterial  calcification.   Basic metabolic panel     Status: Abnormal   Result Value Ref Range    Sodium 139 133 - 144 mmol/L    Potassium 4.2 3.4 - 5.3 mmol/L    Chloride 104 94 - 109 mmol/L    Carbon Dioxide (CO2) 22 20 - 32 mmol/L    Anion Gap 13 3 - 14 mmol/L    Urea Nitrogen 18 7 - 30 mg/dL    Creatinine 0.95 0.66 - 1.25 mg/dL    Calcium 9.2 8.5 - 10.1 mg/dL    Glucose 140 (H) 70 - 99 mg/dL    GFR Estimate 83 >60 mL/min/1.73m2   Troponin I     Status: Normal   Result Value Ref Range    Troponin I High Sensitivity 72 <79 ng/L   Nt probnp inpatient (BNP)     Status: Normal   Result Value Ref Range    N terminal Pro BNP Inpatient 386 0 - 900 pg/mL   Symptomatic; Unknown Influenza A/B & SARS-CoV2 (COVID-19) Virus PCR Multiplex Nasopharyngeal     Status: Normal    Specimen: Nasopharyngeal; Swab   Result Value Ref Range    Influenza A PCR Negative Negative    Influenza B PCR Negative Negative    RSV PCR Negative Negative    SARS CoV2 PCR Negative Negative    Narrative    Testing was performed using the Xpert Xpress CoV2/Flu/RSV Assay on the Cepheid GeneXpert Instrument. This test should be ordered for the detection of SARS-CoV-2 and influenza viruses in individuals who meet clinical and/or epidemiological criteria. Test performance is unknown in asymptomatic patients. This test is for in vitro diagnostic use under the FDA EUA for laboratories certified under CLIA to perform high or moderate complexity testing. This test has not been FDA cleared or approved. A negative result does not rule out the presence of PCR inhibitors in the specimen or target RNA in concentration below the limit of detection for the assay. If only one viral target is positive but coinfection with multiple targets is suspected, the sample should be re-tested with another FDA cleared, approved, or authorized test, if coinfection would change clinical management. This test was validated by the [facility]. These laboratories are certified  under the Clinical Laboratory Improvement Amendments of 1988 (CLIA-88) as qualified to perform high complexity laboratory testing.   CBC with platelets and differential     Status: Abnormal   Result Value Ref Range    WBC Count 16.4 (H) 4.0 - 11.0 10e3/uL    RBC Count 5.22 4.40 - 5.90 10e6/uL    Hemoglobin 15.4 13.3 - 17.7 g/dL    Hematocrit 46.7 40.0 - 53.0 %    MCV 90 78 - 100 fL    MCH 29.5 26.5 - 33.0 pg    MCHC 33.0 31.5 - 36.5 g/dL    RDW 12.8 10.0 - 15.0 %    Platelet Count 203 150 - 450 10e3/uL    % Neutrophils 90 %    % Lymphocytes 3 %    % Monocytes 6 %    % Eosinophils 0 %    % Basophils 0 %    % Immature Granulocytes 1 %    NRBCs per 100 WBC 0 <1 /100    Absolute Neutrophils 14.8 (H) 1.6 - 8.3 10e3/uL    Absolute Lymphocytes 0.4 (L) 0.8 - 5.3 10e3/uL    Absolute Monocytes 1.0 0.0 - 1.3 10e3/uL    Absolute Eosinophils 0.0 0.0 - 0.7 10e3/uL    Absolute Basophils 0.0 0.0 - 0.2 10e3/uL    Absolute Immature Granulocytes 0.1 <=0.4 10e3/uL    Absolute NRBCs 0.0 10e3/uL   iStat Gases (lactate) venous, POCT     Status: Abnormal   Result Value Ref Range    Lactic Acid POCT 3.6 (H) <=2.0 mmol/L    Bicarbonate Venous POCT 26 21 - 28 mmol/L    O2 Sat, Venous POCT 56 (L) 94 - 100 %    pCO2V Venous POCT 48 40 - 50 mm Hg    pH Venous POCT 7.34 7.32 - 7.43    pO2 Venous POCT 32 25 - 47 mm Hg   EKG 12 lead     Status: None (Preliminary result)   Result Value Ref Range    Systolic Blood Pressure  mmHg    Diastolic Blood Pressure  mmHg    Ventricular Rate 114 BPM    Atrial Rate 114 BPM    FL Interval 178 ms    QRS Duration 78 ms     ms    QTc 509 ms    P Axis 39 degrees    R AXIS 57 degrees    T Axis 46 degrees    Interpretation ECG        Critical Test Result: STEMI  Sinus tachycardia with occasional Premature ventricular complexes  ST elevation consider inferolateral injury or acute infarct  ** ** ACUTE MI / STEMI ** **  Abnormal ECG  When compared with ECG of 24-FEB-2019 21:42,  Premature ventricular complexes are  now Present  T wave amplitude has increased in Anterior leads  Nonspecific T wave abnormality now evident in Lateral leads     EKG 12-lead, tracing only     Status: None (Preliminary result)   Result Value Ref Range    Systolic Blood Pressure  mmHg    Diastolic Blood Pressure  mmHg    Ventricular Rate 110 BPM    Atrial Rate 110 BPM    OK Interval 162 ms    QRS Duration 78 ms     ms    QTc 479 ms    P Axis 42 degrees    R AXIS 52 degrees    T Axis 56 degrees    Interpretation ECG       Sinus tachycardia  Otherwise normal ECG  When compared with ECG of 15-OCT-2022 02:53, (unconfirmed)  Premature ventricular complexes are no longer Present  ST no longer elevated in Inferior leads     CBC with platelets differential     Status: Abnormal    Narrative    The following orders were created for panel order CBC with platelets differential.  Procedure                               Abnormality         Status                     ---------                               -----------         ------                     CBC with platelets and d...[[id number]]  Abnormal            Final result                 Please view results for these tests on the individual orders.       Treatments and/or interventions provided: on BiPAP, then removed at 0530, placed him on oxymask @5L  Medications   0.9% sodium chloride BOLUS (1,000 mLs Intravenous New Bag 10/15/22 0442)   ipratropium - albuterol 0.5 mg/2.5 mg/3 mL (DUONEB) neb solution 3 mL (3 mLs Nebulization Given 10/15/22 0300)   methylPREDNISolone sodium succinate (solu-MEDROL) injection 62.5 mg (62.5 mg Intravenous Given 10/15/22 0303)   iopamidol (ISOVUE-370) solution 67 mL (67 mLs Intravenous Given 10/15/22 0357)   Saline Flush (92 mLs Intravenous Given 10/15/22 0357)     Patient's response to treatments and/or interventions: respiratory status improved, VSS, comfortably resting in stretcher.     To be done/followed up on inpatient unit:  continue with POC    Does this patient have  "any cognitive concerns?:  n/a    Activity level - Baseline/Home:  Independent  Activity Level - Current:   Total Care    Patient's Preferred language: English   Needed?: No    Isolation: None  Infection: Not Applicable  Patient tested for COVID 19 prior to admission: YES  Bariatric?: No    Vital Signs:   Vitals:    10/15/22 0246 10/15/22 0304 10/15/22 0310 10/15/22 0422   BP: 130/73   127/55   Pulse: 114 112  101   Resp: 28 30  8   Temp:   99.3  F (37.4  C)    TempSrc:   Temporal    SpO2: 98% 99%  97%   Weight: 81 kg (178 lb 9.6 oz)      Height: 1.753 m (5' 9\")          Cardiac Rhythm:     Was the PSS-3 completed:   Yes  What interventions are required if any?    Family Comments: pt by himself in ED  OBS brochure/video discussed/provided to patient/family: N/A  For the majority of the shift this patient's behavior was Green.   Behavioral interventions performed were n/a.    ED NURSE PHONE NUMBER: [phone]       "

## 2022-10-15 NOTE — ED TRIAGE NOTES
Pt hx of COPD and R lung cancer with radiation therapy. Pt not using home O2. Increased SOB past 3 days. Ems report SpO2 66% on RA upon their arrival. Patient given 1 duoneb and albuterol through CPAP. Placed on BIPAP upon arrival to ED     Triage Assessment     Row Name 10/15/22 0243       Triage Assessment (Adult)    Airway WDL X    Additional Documentation Breath Sounds (Group)       Respiratory WDL    Respiratory WDL rhythm/pattern    Rhythm/Pattern, Respiratory tachypneic   arrived on CPAP SpO2 99 after duoneb and albuterol       Skin Circulation/Temperature WDL    Skin Circulation/Temperature WDL WDL       Peripheral/Neurovascular WDL    Peripheral Neurovascular WDL WDL       Cognitive/Neuro/Behavioral WDL    Cognitive/Neuro/Behavioral WDL WDL

## 2022-10-15 NOTE — ED PROVIDER NOTES
"  History   Chief Complaint:  Shortness of Breath     The history is provided by the patient and the EMS personnel.      Harrison Thomas is a 77 year old male with history of HTN, RA, thyroid cancer, lung cancer s/p radiation, and COPD presenting with shortness of breath. He has been having increasing shortness of breath at home for at least the last 3 days. He has been using his albuterol nebulizers with some relief. This evening he awoke and was very short of breath prompting call to EMS. They found him to be 66% on room air for which EMS placed CPAP and administered a Duoneb and an albuterol nebulizer en route. By the time of arrival he is feeling improved.    Harrison has never required BiPAP in the past but, per report, was previously on home O2 (last about 1 year ago). He denies fever, cough, or chest pain. He denies recent sick contacts. He is vaccinated against COVID.     Harrison did have a fall tonight without syncope, remarking he simply \"lost my balance.\" He denies head injury or neck pain but nonspecific left lower extremity and hip pain.    Review of Systems   Unable to perform ROS: Acuity of condition   Constitutional: Negative for fever.   Respiratory: Positive for shortness of breath. Negative for cough.    Cardiovascular: Negative for chest pain and leg swelling.   Musculoskeletal: Positive for arthralgias (left hip). Negative for neck pain.   Skin: Negative for wound.   Neurological: Negative for syncope and headaches.     Allergies:  Levaquin  Plavix   Atorvastatin Calcium  Cats  Dogs  Hydrochlorothiazide  Sulfasalazine    Medications:  Albuterol   Amlodipine   Lasix  Norco   Synthroid  Lisinopril   Metoprolol   Crestor   Trelegy     Past Medical History:     Hypertension   COPD  Monoclonal paraproteinemia   Immunodeficiency   Transient cerebral ischemia   Hyperlipidemia    Occipital neuralgia   Bruit   Chronic opioid use   Atherosclerosis of native coronary artery   Thyrotoxicosis " "  CAD  BRENNEN  BPH  RA   Metastases of thyroid cancer   Inguinal hernia    Lung cancer     Past Surgical History:    Cervical lymph node biopsy   Bronchoscopy, endobronchial ultrasound  Cardiac stent placed  Cataract extraction   Colonoscopy   EGD   Retina repair   Heart cath, angioplasty  Herniorrhaphy, inguinal   Left hip replacement   Laser holmium enucleation of prostate   Mediastinoscopy   Right meniscus repair   Thoracoscopy   Thyroidectomy      Family History:    Mother: CAD, diabetes, hypertension, hyperlipidemia, CVA, depression, cancer, osteoprosis   Father: COPD, asthma     Social History:  The patient presents to the ED with EMS  Former smoker    Physical Exam     Patient Vitals for the past 24 hrs:   BP Temp Temp src Pulse Resp SpO2 Height Weight   10/15/22 0600 118/69 99.3  F (37.4  C) Axillary 95 15 96 % -- --   10/15/22 0530 -- -- -- 93 18 93 % -- --   10/15/22 0500 100/53 -- -- 92 20 96 % -- --   10/15/22 0422 127/55 -- -- 101 8 97 % -- --   10/15/22 0359 -- -- -- -- (P) 24 (P) 96 % -- --   10/15/22 0345 120/79 -- -- 109 28 97 % -- --   10/15/22 0310 -- 99.3  F (37.4  C) Temporal -- -- -- -- --   10/15/22 0304 -- -- -- 112 30 99 % -- --   10/15/22 0246 130/73 -- -- 114 28 98 % 1.753 m (5' 9\") 81 kg (178 lb 9.6 oz)       Physical Exam  General: Well-developed and well-nourished. Ill appearing elderly  man arriving on EMS cart with CPAP ongoing. Cooperative.  Head:  Atraumatic.  Eyes:  Conjunctivae, lids, and sclerae are normal.  Neck:  Supple. Normal range of motion.  CV:  Tachycardic rate and regular rhythm. Normal heart sounds with no murmurs, rubs, or gallops detected.  Resp:  Increased work of breathing with belly breathing and tachypnea. Diminished breath sounds throughout without rales or rhonchi.  GI:  Soft. Non-distended. Non-tender.    MS:  Normal ROM. No bilateral lower extremity edema. No focal bony tenderness to the left lower extremity.  Skin:  Warm. Non-diaphoretic. No " pallor.  Neuro: Awake. A&Ox3. Normal strength.  Psych:  Normal mood and affect. Normal speech.  Vitals reviewed.    Emergency Department Course   EKG  Indication: Shortness of breath  Time: 0335  Rate 110 bpm. ND interval 162. QRS duration 78. QT/QTc 354/479.   Sinus tachycardia   Otherwise normal ECG  No acute ST changes.  No significant change as compared to prior, dated 12/29/21.    Imaging:  CT Chest Pulmonary Embolism w Contrast   Final Result   IMPRESSION:   1.  No pulmonary embolism.      2.  Emphysematous change.      3.  Previously described 1.5 cm nodule in the right upper lobe is not seen due to surrounding consolidative changes which have progressed. In addition, there is new subpleural consolidation within the left lower lobe with tiny pleural effusion. Increased    interstitial changes throughout the lungs with underlying emphysematous change.      4.  Progression of mediastinal lymphadenopathy.      5.  Cirrhosis. Postsurgical changes in the right hepatic lobe.      6.  Cholelithiasis.         XR Pelvis w Hip Left 1 View   Final Result   IMPRESSION: Prior left hip replacement. Prosthetic components appear intact and normally aligned. No fracture. Severe arterial calcification.        Report per radiology    Laboratory:  Labs Ordered and Resulted from Time of ED Arrival to Time of ED Departure   BASIC METABOLIC PANEL - Abnormal       Result Value    Sodium 139      Potassium 4.2      Chloride 104      Carbon Dioxide (CO2) 22      Anion Gap 13      Urea Nitrogen 18      Creatinine 0.95      Calcium 9.2      Glucose 140 (*)     GFR Estimate 83     CBC WITH PLATELETS AND DIFFERENTIAL - Abnormal    WBC Count 16.4 (*)     RBC Count 5.22      Hemoglobin 15.4      Hematocrit 46.7      MCV 90      MCH 29.5      MCHC 33.0      RDW 12.8      Platelet Count 203      % Neutrophils 90      % Lymphocytes 3      % Monocytes 6      % Eosinophils 0      % Basophils 0      % Immature Granulocytes 1      NRBCs per 100  WBC 0      Absolute Neutrophils 14.8 (*)     Absolute Lymphocytes 0.4 (*)     Absolute Monocytes 1.0      Absolute Eosinophils 0.0      Absolute Basophils 0.0      Absolute Immature Granulocytes 0.1      Absolute NRBCs 0.0     ISTAT GASES LACTATE VENOUS POCT - Abnormal    Lactic Acid POCT 3.6 (*)     Bicarbonate Venous POCT 26      O2 Sat, Venous POCT 56 (*)     pCO2V Venous POCT 48      pH Venous POCT 7.34      pO2 Venous POCT 32     ISTAT GASES LACTATE VENOUS POCT - Abnormal    Lactic Acid POCT 1.7      Bicarbonate Venous POCT 25      O2 Sat, Venous POCT 64 (*)     pCO2V Venous POCT 44      pH Venous POCT 7.35      pO2 Venous POCT 35     TROPONIN I - Normal    Troponin I High Sensitivity 72     NT PROBNP INPATIENT - Normal    N terminal Pro BNP Inpatient 386     INFLUENZA A/B & SARS-COV2 PCR MULTIPLEX - Normal    Influenza A PCR Negative      Influenza B PCR Negative      RSV PCR Negative      SARS CoV2 PCR Negative        Emergency Department Course:  Reviewed:  I reviewed nursing notes, vitals, and past medical history.    Assessments:  0241 I obtained history and examined the patient as noted above.   0244 Switched to ED BiPAP.   0521 I rechecked the patient and he appears well on BiPAP. We will transition off this.    Consults:  0541 I spoke with Dr. Calvert of the hospitalist service regarding patient's presentation, findings, and plan of care.     Interventions:  0300 Duoneb, 3 mL, Nebulization  0303 Solu-medrol, 62.5 mg, IV  0442 NS, 1 L, IV  0555 Norco, 2 tablets, PO    Disposition:  The patient was admitted to the hospital under the care of Dr. Calvert.     Impression & Plan   The Lactic acid level is elevated due to work of breathing, at this time there is no sign of severe sepsis or septic shock.    Medical Decision Making:  Harrison is a 77 year old man with COPD and lung cancer presenting with shortness of breath.  He has had this for few days but it significantly worsened this evening prompting call  to paramedics where they found him to be 66% on room air.  He is feeling somewhat improved after nebulizers and CPAP en route.  He denies fever or chest pain.  He did have a fall without syncope and since has had left lower extremity pain.  It seems this is primarily in the hip although he has a difficult time localizing it.  On arrival he appears ill.  He is tachypneic with belly breathing and diminished breath sounds throughout.  There is associated tachycardia although he is not hypotensive or febrile.  He does not appear volume overloaded. There is no focal tenderness to the left lower extremity or hip.    He was immediately placed on BiPAP and given a DuoNeb and Solu-Medrol for presumptive COPD exacerbation with improvement.  EKG is reassuring with sinus tachycardia and troponin is normal.  BNP is normal.  Again, he does not appear volume overloaded and I think it is unlikely this is cardiac in etiology.  I did send him for CT angiogram of the chest which rules out pulmonary embolism though there is worsening consolidative changes around his known lung nodule as well as a new left lower lobe consolidation and progression of mediastinal lymphadenopathy.  Initial lactate was elevated at 3.6 and there is leukocytosis to 16.4.  However, he denies even presence of cough and there is no convincing evidence of severe sepsis.  After a single liter of IV fluids his lactate normalized and was likely elevated due to his work of breathing..  Antibiotics were not administered.  COVID-19, influenza, and RSV testing were negative.  There is no kidney injury or electrolyte derangement nor is there anemia.  X-ray of the pelvis and left hip is unremarkable.  He was given Norco for this pain.    After several hours on BiPAP we were able to transition to him to an OxiMax.  However, he clearly requires hospitalization for attention to his COPD exacerbation.  I updated him on findings and plan for admission and answered all his  questions.  He verbalized understanding and is amenable.  I discussed the patient's case with Dr. Calvert, hospitalist, who accepts admission and has no further orders.    Diagnosis:    ICD-10-CM    1. Acute respiratory failure with hypoxia (H)  J96.01       2. COPD exacerbation (H)  J44.1       3. Leukocytosis, unspecified type  D72.829       4. Right upper lobe consolidation (H)  J18.1       5. Left lower lobe consolidation (H)  J18.1       6. Fall in home, initial encounter  W19.XXXA     Y92.009       7. Hip pain, left  M25.552           Scribe Disclosure:  I, Scott Mccann, am serving as a scribe at 2:44 AM on 10/15/2022 to document services personally performed by Radha Murdock MD based on my observations and the provider's statements to me.            Radha Murdock MD  10/18/22 1123    Critical Care time was 35 minutes for this patient excluding procedures.         Radha Murdock MD  12/17/22 1011
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
## 2022-10-15 NOTE — PROGRESS NOTES
.RECEIVING UNIT ED HANDOFF REVIEW    ED Nurse Handoff Report was reviewed by: Eleni Salvador RN on October 15, 2022 at 7:25 AM

## 2022-10-15 NOTE — ED NOTES
Bed: ST03  Expected date:   Expected time:   Means of arrival:   Comments:  710 COPD/Lung CA pt on CPAP

## 2022-10-15 NOTE — PHARMACY-ADMISSION MEDICATION HISTORY
Pharmacy Medication History  Admission medication history interview status for the 10/15/2022  admission is complete. See EPIC admission navigator for prior to admission medications     Location of Interview: Phone  Medication history sources: Patient and Surescripts    Significant changes made to the medication list:  Adjusted os-castro 1200 mg --> 600 mg BID   Removed levalbuterol PRN nebs - Rx  as of 2022, pt states not taking  Clarified metoprolol dosing (confirmed dose of 50 mg only, despite prescribed directions to combine with 25 mg tablet, despite 25 mg tablet having no fill history in the past 12 months)   Adjusted senna-docusate to PRN     In the past week, patient estimated taking medication this percent of the time: greater than 90%    Additional medication history information:   Patient short of breath in interview, mostly answered yes/no questions about medication use, able to clarify discrepancy details. Patient states he sets up vitamins in the morning and will spread out taking them throughout the day.     Medication reconciliation completed by provider prior to medication history? partially    Time spent in this activity: 20 minutes    Medication Sig Last Dose Taking? Auth Provider   acetaminophen (TYLENOL) 325 MG tablet Take 2 tablets (650 mg) by mouth every 4 hours as needed for other (For optimal non-opioid multimodal pain management to improve pain control.) More than a month at PRN Yes Mariano Zarco MD   albuterol (PROAIR HFA/PROVENTIL HFA/VENTOLIN HFA) 108 (90 Base) MCG/ACT inhaler INHALE 2 PUFFS BY MOUTH EVERY 6 HOURS AS NEEDED FOR WHEEZE OR FOR SHORTNESS OF BREATH 10/14/2022 at PRN Yes Yaneli Dozier MD   amLODIPine (NORVASC) 5 MG tablet Take 1 tablet (5 mg) by mouth 2 times daily 10/14/2022 at PM Yes Yaneli Dozier MD   Ascorbic Acid (VITAMIN C PO) Take 1 tablet by mouth daily 10/14/2022 at Unknown time Yes Reported, Patient   aspirin 81 MG tablet Take 1 tablet by mouth 2 times  daily  10/14/2022 at PM Yes Reported, Patient   calcium carbonate (OS-KARLIE) 1500 (600 Ca) MG tablet Take 600 mg by mouth 2 times daily 10/14/2022 at PM Yes Unknown, Entered By History   Carisoprodol 250 MG TABS Take 250 mg by mouth daily as needed (spasms) More than a month at PRN Yes Yaneli Dozier MD   cholecalciferol 25 MCG (1000 UT) TABS Take 1,000 Units by mouth daily  10/14/2022 at Unknown time Yes Reported, Patient   FISH OIL 1000 MG OR CAPS Take 1 g by mouth daily  10/14/2022 at Unknown time Yes Reported, Patient   Fluticasone-Umeclidin-Vilanterol (TRELEGY ELLIPTA) 100-62.5-25 MCG/INH oral inhaler Inhale 1 puff into the lungs daily 10/14/2022 at AM Yes Yaneli Dozier MD   furosemide (LASIX) 20 MG tablet Take 1 tablet (20 mg) by mouth daily 10/14/2022 at AM Yes Yaneli Dozier MD   guaiFENesin (MUCINEX) 600 MG 12 hr tablet Take 600 mg by mouth 2 times daily as needed for congestion More than a month at PRN Yes Reported, Patient   HYDROcodone-acetaminophen (NORCO) 5-325 MG tablet Take 1 tablet by mouth every 6 hours as needed for severe pain 10/14/2022 at Unknown time Yes Yaneli Dozier MD   levothyroxine (SYNTHROID/LEVOTHROID) 137 MCG tablet TAKE 1 TABLET (137 MCG) BY MOUTH DAILY , STARTING ON 9/23/21 10/14/2022 at AM Yes Demarcus Simmons MD   Lidocaine (LIDOCARE) 4 % Patch Place 1 patch onto the skin daily as needed for moderate pain To prevent lidocaine toxicity, patient should be patch free for 12 hrs daily. For low back pain Past Month at PRN Yes Vidya Hall PA-C   lisinopril (ZESTRIL) 40 MG tablet TAKE 1 TABLET BY MOUTH EVERY DAY 10/14/2022 at AM Yes Yaneli Dozier MD   metoprolol succinate ER (TOPROL XL) 50 MG 24 hr tablet TAKE 1 TABLET (50 MG) BY MOUTH DAILY (TAKE WITH 25MG TABLET FOR TOTAL 75MG DAILY)  Patient taking differently: Take 50 mg by mouth daily (Does not have 25 mg tablet/does not combine dose) 10/14/2022 at AM Yes Yaneli Dozier MD   multivitamin w/minerals  (THERA-VIT-M) tablet Take 1 tablet by mouth daily  10/14/2022 at Unknown time Yes Reported, Patient   naloxone (NARCAN) 4 MG/0.1ML nasal spray Spray 1 spray (4 mg) into one nostril alternating nostrils once as needed for opioid reversal every 2-3 minutes until assistance arrives  at has home supply Yes Yaneli Dozier MD   nitroGLYcerin (NITROSTAT) 0.4 MG sublingual tablet FOR CHEST PAIN PLACE 1 TAB UNDER TONGUE EVERY 5 MIN FOR 3 DOSES. IF SYMPTOMS PERSIST 5 MIN AFTER 1ST DOSE CALL 911  at [facility] home supply Yes Yaneli Dozier MD   rosuvastatin (CRESTOR) 10 MG tablet TAKE 1 TABLET BY MOUTH EVERY DAY 10/14/2022 at PM Yes Yaneli Dozier MD   senna-docusate (SENOKOT-S/PERICOLACE) 8.6-50 MG tablet Take 1 tablet by mouth 2 times daily as needed for constipation More than a month at PRN Yes Unknown, Entered By History   sildenafil (VIAGRA) 100 MG tablet Take 100 mg by mouth daily as needed   at none 48h PTA Yes Jerry Horvath MD   DULoxetine (CYMBALTA) 20 MG capsule Take 1 capsule (20 mg) by mouth daily  Patient not taking: Reported on 10/15/2022 Not Taking  Yaneli Dozier MD       The information provided in this note is only as accurate as the sources available at the time of update(s)   Clari Gallegos, Ching[employer]

## 2022-10-16 ENCOUNTER — APPOINTMENT (OUTPATIENT)
Dept: PHYSICAL THERAPY | Facility: CLINIC | Age: 77
DRG: 190 | End: 2022-10-16
Attending: INTERNAL MEDICINE
Payer: MEDICARE

## 2022-10-16 PROCEDURE — 99233 SBSQ HOSP IP/OBS HIGH 50: CPT | Performed by: HOSPITALIST

## 2022-10-16 PROCEDURE — 97530 THERAPEUTIC ACTIVITIES: CPT | Mod: GP

## 2022-10-16 PROCEDURE — 120N000001 HC R&B MED SURG/OB

## 2022-10-16 PROCEDURE — 97161 PT EVAL LOW COMPLEX 20 MIN: CPT | Mod: GP

## 2022-10-16 PROCEDURE — 250N000009 HC RX 250: Performed by: HOSPITALIST

## 2022-10-16 PROCEDURE — 250N000011 HC RX IP 250 OP 636: Performed by: HOSPITALIST

## 2022-10-16 PROCEDURE — 250N000009 HC RX 250: Performed by: INTERNAL MEDICINE

## 2022-10-16 PROCEDURE — 94640 AIRWAY INHALATION TREATMENT: CPT | Mod: 76

## 2022-10-16 PROCEDURE — 250N000013 HC RX MED GY IP 250 OP 250 PS 637: Performed by: INTERNAL MEDICINE

## 2022-10-16 PROCEDURE — 250N000013 HC RX MED GY IP 250 OP 250 PS 637: Performed by: HOSPITALIST

## 2022-10-16 PROCEDURE — 999N000157 HC STATISTIC RCP TIME EA 10 MIN

## 2022-10-16 PROCEDURE — 97116 GAIT TRAINING THERAPY: CPT | Mod: GP

## 2022-10-16 RX ORDER — IPRATROPIUM BROMIDE AND ALBUTEROL SULFATE 2.5; .5 MG/3ML; MG/3ML
3 SOLUTION RESPIRATORY (INHALATION) 3 TIMES DAILY
Status: DISCONTINUED | OUTPATIENT
Start: 2022-10-16 | End: 2022-10-21 | Stop reason: HOSPADM

## 2022-10-16 RX ORDER — IPRATROPIUM BROMIDE AND ALBUTEROL SULFATE 2.5; .5 MG/3ML; MG/3ML
3 SOLUTION RESPIRATORY (INHALATION) EVERY 8 HOURS
Status: DISCONTINUED | OUTPATIENT
Start: 2022-10-16 | End: 2022-10-16

## 2022-10-16 RX ORDER — METHYLPREDNISOLONE SODIUM SUCCINATE 125 MG/2ML
80 INJECTION, POWDER, LYOPHILIZED, FOR SOLUTION INTRAMUSCULAR; INTRAVENOUS DAILY
Status: DISCONTINUED | OUTPATIENT
Start: 2022-10-17 | End: 2022-10-20

## 2022-10-16 RX ADMIN — DOXYCYCLINE HYCLATE 100 MG: 100 CAPSULE ORAL at 08:00

## 2022-10-16 RX ADMIN — AMLODIPINE BESYLATE 5 MG: 5 TABLET ORAL at 20:34

## 2022-10-16 RX ADMIN — ASPIRIN 81 MG: 81 TABLET, COATED ORAL at 08:00

## 2022-10-16 RX ADMIN — IPRATROPIUM BROMIDE AND ALBUTEROL SULFATE 3 ML: .5; 3 SOLUTION RESPIRATORY (INHALATION) at 15:27

## 2022-10-16 RX ADMIN — ROSUVASTATIN CALCIUM 10 MG: 10 TABLET, FILM COATED ORAL at 20:35

## 2022-10-16 RX ADMIN — METOPROLOL SUCCINATE 50 MG: 50 TABLET, EXTENDED RELEASE ORAL at 08:00

## 2022-10-16 RX ADMIN — METHYLPREDNISOLONE SODIUM SUCCINATE 81.25 MG: 125 INJECTION, POWDER, FOR SOLUTION INTRAMUSCULAR; INTRAVENOUS at 08:00

## 2022-10-16 RX ADMIN — HYDROCODONE BITARTRATE AND ACETAMINOPHEN 2 TABLET: 5; 325 TABLET ORAL at 07:53

## 2022-10-16 RX ADMIN — HYDROCODONE BITARTRATE AND ACETAMINOPHEN 2 TABLET: 5; 325 TABLET ORAL at 22:13

## 2022-10-16 RX ADMIN — LISINOPRIL 40 MG: 20 TABLET ORAL at 08:00

## 2022-10-16 RX ADMIN — IPRATROPIUM BROMIDE AND ALBUTEROL SULFATE 3 ML: .5; 3 SOLUTION RESPIRATORY (INHALATION) at 07:26

## 2022-10-16 RX ADMIN — DOXYCYCLINE HYCLATE 100 MG: 100 CAPSULE ORAL at 20:34

## 2022-10-16 RX ADMIN — HYDROCODONE BITARTRATE AND ACETAMINOPHEN 1 TABLET: 5; 325 TABLET ORAL at 12:10

## 2022-10-16 RX ADMIN — LEVOTHYROXINE SODIUM 137 MCG: 112 TABLET ORAL at 07:59

## 2022-10-16 RX ADMIN — HYDROCODONE BITARTRATE AND ACETAMINOPHEN 1 TABLET: 5; 325 TABLET ORAL at 16:31

## 2022-10-16 RX ADMIN — ASPIRIN 81 MG: 81 TABLET, COATED ORAL at 20:42

## 2022-10-16 RX ADMIN — FUROSEMIDE 20 MG: 20 TABLET ORAL at 07:59

## 2022-10-16 RX ADMIN — AMLODIPINE BESYLATE 5 MG: 5 TABLET ORAL at 08:00

## 2022-10-16 ASSESSMENT — ACTIVITIES OF DAILY LIVING (ADL)
ADLS_ACUITY_SCORE: 18
ADLS_ACUITY_SCORE: 18
ADLS_ACUITY_SCORE: 21
ADLS_ACUITY_SCORE: 18
ADLS_ACUITY_SCORE: 21
ADLS_ACUITY_SCORE: 18
ADLS_ACUITY_SCORE: 21
ADLS_ACUITY_SCORE: 18
ADLS_ACUITY_SCORE: 18
ADLS_ACUITY_SCORE: 21
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
## 2022-10-16 NOTE — PROGRESS NOTES
"   10/16/22 0701   Appointment Info   Signing Clinician's Name / Credentials (PT) Alpesh Etienne DPT   Rehab Comments (PT) 4L/min O2 supp, L hip pain   Living Environment   People in Home spouse   Current Living Arrangements house   Home Accessibility stairs to enter home   Number of Stairs, Main Entrance 1   Stair Railings, Main Entrance none   Transportation Anticipated family or friend will provide;car, drives self   Living Environment Comments Pt lives in house with spouse who can provide rides. 1 stait to enter home with all necessary amenities on main level of home once inside.   Self-Care   Usual Activity Tolerance moderate   Current Activity Tolerance fair   Regular Exercise No  (pt goes for short walks with spouse sometimes)   Fall history within last six months yes   Number of times patient has fallen within last six months 3   Activity/Exercise/Self-Care Comment Pt does not use AD at baseline, but spouse just bought a 4WW for pt to use at home when he discharges. Pt has shower chair at home as well. Pt typically can only walk about 1 city block at baseline. Pt has previously used supplemental O2 at home, but was not using any home oxygen at home prior to this admission.   General Information   Onset of Illness/Injury or Date of Surgery 10/15/22   Referring Physician Marc Calvert MD   Patient/Family Therapy Goals Statement (PT) return home   Pertinent History of Current Problem (include personal factors and/or comorbidities that impact the POC) Per chart review, \"Harrison Thomas is a 77 year old male who presents with shortness of breath, COPD exacerbation and acute respiratory failure with hypoxia.\"   Cognition   Affect/Mental Status (Cognition) WNL   Integumentary/Edema   Integumentary/Edema no deficits were identifed   Posture    Posture Forward head position;Protracted shoulders   Range of Motion (ROM)   ROM Comment UE AROM WFL, RLE AROM WNL, LLE AROM limited secondary to pain and " weakness   Strength (Manual Muscle Testing)   Strength (Manual Muscle Testing) Deficits observed during functional mobility   Strength Comments 3/5 L hip flexion secondary to pain, all other LE MMT's 5/5. pt demonstrating difficulty with sit>stand demonstrating functional LE weakness   Bed Mobility   Comment, (Bed Mobility) supine>sit CGA-min A   Transfers   Comment, (Transfers) sit>stand w/ CGA and FWW   Gait/Stairs (Locomotion)   Distance in Feet (Required for LE Total Joints) 10' eval   Distance in Feet (Gait) 10' eval and 120 additional feet   Comment, (Gait/Stairs) pt ambulated 10' for eval w/ FWW and CGA. pt demonstrating incrased SOB and fatigue with stooped forward posture. Maintained ER of LLE throughout gait cycle. Minimal improvement with cueing for correction.   Balance   Balance Comments dynamic balance impaired with use of AD for ambulation   Sensory Examination   Sensory Perception WFL   Clinical Impression   Criteria for Skilled Therapeutic Intervention Yes, treatment indicated   PT Diagnosis (PT) impaired functional mobility   Influenced by the following impairments gross LE weakness, impaired dynamic balance, impaired activity tolerance   Functional limitations due to impairments limited independence with functional mobility   Clinical Presentation (PT Evaluation Complexity) Evolving/Changing   Clinical Presentation Rationale clinical judgement   Clinical Decision Making (Complexity) low complexity   Planned Therapy Interventions (PT) balance training;bed mobility training;gait training;patient/family education;stair training;strengthening;transfer training   Risk & Benefits of therapy have been explained evaluation/treatment results reviewed;care plan/treatment goals reviewed;risks/benefits reviewed;current/potential barriers reviewed;participants voiced agreement with care plan;participants included;patient;spouse/significant other   PT Total Evaluation Time   PT Eval, Low Complexity Minutes  (90808) 7   Physical Therapy Goals   PT Frequency Daily   PT Predicted Duration/Target Date for Goal Attainment 10/23/22   PT Goals Bed Mobility;Transfers;Gait;Stairs   PT: Bed Mobility Modified independent;Supine to/from sit   PT: Transfers Supervision/stand-by assist;Sit to/from stand;Assistive device   PT: Gait Supervision/stand-by assist;Rolling walker;Greater than 200 feet   PT: Stairs Supervision/stand-by assist;1 stair   Therapeutic Activity   Therapeutic Activities: dynamic activities to improve functional performance Minutes (64092) 10   Symptoms Noted During/After Treatment Fatigue;Shortness of breath   Treatment Detail/Skilled Intervention Greeted pt upon arrival to room. Pt agreeable to working with PT. PT evaluation completed and role of IP PT explained to pt. Spouse present in room throughout session. Pt on 4L/min O2 supp via NC throughout session. O2 sats 95 in supine. Supine>sit w/ CGA. Sit>stand w/ FWW and CGA. Cueing for safe and efficient sit<>stand procedure including scooting to the front edge of the chair, to lean forward with nose over toes, and hand and foot placement. Following ambulation, pt stand>sit to chair with increased SOB and fatigue. O2 sats at 82%, O2 increased to 6L with instruction in pursed lip breathing. Pt recovered O2 sats to 94% during seated rest break. Returned to 4L/min O2 supp. Chair>bed transfer w/ CGA and FWW. Sit>supine in bed w/ min A and assistance necessary to advance LLE into bed. Pt left supine in bed at end of session with all needs met, bed alarm on, and call light within reach.   Gait Training   Gait Training Minutes (09506) 13   Symptoms Noted During/After Treatment (Gait Training) fatigue;increased pain;shortness of breath   Treatment Detail/Skilled Intervention Pt ambulated 120 additional feet in hallway w/ FWW and CGA. Pt on 4L/min O2 supp via NC throughout. Noting pt demonstrating forward head  posture with head and eyes facing downward and stooped  forward posture. Cueing to stand tall with head and eyes up. Improved upright posture following cueing. Pt demonstrating sustained ER of LLE throughout gait cycle. Cueing to correct foot positioning with minimal improvement. Pt stating walking with his L foot pointed outward decreases experienced pain in L hip. L hip, SOB, and fatigue are main limiting factors while ambulating.   PT Discharge Planning   PT Plan PT: increase ambulation distance w/ FWW, monitor O2 sats with O2 supp, repeat sit>stands, navigate 1 stair   PT Discharge Recommendation (DC Rec) home with assist;home with outpatient physical therapy   PT Rationale for DC Rec Pt currently below functional baseline mobility level. Patient's main limiting factors are shortness of breath and fatigue with mobility and pain in L hip. Pt demonstrating good safety and stability with short distance ambulation with FWW though. Pt has 24/7 assistance available from spouse at home. Has 1 stair to enter. Anticipate that pt will progress to safe discharge home pending medical status progress based on PLOF and current mobility level. According to chart review, pt has plans to follow up with outpatient orthopedic provider regarding L hip pain. If discharge home is not possible, pt would benefit from TCU stay to increase independence and safety with functional mobility.   PT Brief overview of current status CGA w/ FWW for short distance ambulation, min A bed mobility   Total Session Time   Timed Code Treatment Minutes 23   Total Session Time (sum of timed and untimed services) 30

## 2022-10-16 NOTE — PROGRESS NOTES
"[facility]    Hospitalist Progress Note    Date of Admission:  10/15/2022    Assessment & Plan   77-year-old male with known COPD, RUL nodule s/p non-diagnostic VATS and empiric radiation, metastatic papillary thyroid cancer who presented with worsening shortness of breath and 2 falls at home.    Acute respiratory failure with hypoxia  Possible COPD exacerbation versus community-acquired pneumonia versus radiation pneumonitis versus progression of malignancy  Leukocytosis  *Has had \"2-3 months\" SOB worsening recently, some relief with albuterol. This evening woke with marked SOB, EMS found O2 sats 66%. Responded to CPAP. No fevers, cough, chest pain. In EDafebrile, O2 sats 95% off BiPAP at time of my visit. Lactic 3.6, WBC 16.4. BNP, troponin normal. VBG 7.34/48/32/26. influenza and COVID-19 negative. EKG ST, no ischemic changes.   *CT chest w/o PE, consolidative changes around R upper lobe nodule, also new subpleural consolidation in LLL., emphyysema, worsening mediastinal LAD  - telemetry  -Appreciate pulmonology consultation.  Per their recommendations, patient is on Solu-Medrol 80 Mg daily for possible radiation pneumonitis and/or COPD exacerbation.  - duonebs q8 hours scheduled  - q2 hour albuterol nebs prn  - Continue ceftriaxone and doxycycline for pneumonia coverage  -Trelegy discontinued per pulmonology in setting of pneumonia.     Fall with L leg pain  Prior left LIZABETH  Chronic back pain  States has had balance issues with his L leg for awhile. Fell twice on day of presentation when leg gave out, now worsening pain. No head trauma or neck pain. XR hip w/o fracture.   On chart review, he does have history of chronic bilateral hip pain, worse on the left.  States that he recently followed up with [facility] and had an MRI.  Apparently was called and told that he may need an aspiration.  - prn analgesics  - XR knee and pelvis with left hip with no acute findings  - PT  -[facility] consulted here.  " "Recommend holding off on any aspirate at this time till current infection is treated.  Follow-up with primary orthopedic surgeon, Dr. Brito as outpatient.     CAD with hx Cx stenting 1997  HTN  HLD  [amlodipine 5 mg daily, aspirin 81 mg daily, furosemide 20 mg daily, lisinopril 40 mg daily, metoprolol XL 50 mg day, rosuvastatin 10 mg daily]  - resume meds      Lung nodule  *Has lung nodule, PET positive. Attempt at VATS with resection unsuccessful 2/2 scar tissue. Has now undergone XRT treatment to the area.   *CT chest on admit w/o PE, consolidative changes around R upper lobe nodule, also new subpleural consolidation in LLL., emphysema, progression of mediastinal LAD.   - pulmonary consult as above.  Concern for progression of malignancy in differential.     Metastatic papillary thyroid cancer  Hypothyroidism  S/p total thyroidectomy 8/2021 with 4/4 LN positive. Treated with radioactive iodine ablation 9/2021.   - resume levothyroxine 137 mcg daily      Cirrhosis  Noted on CT on admission. This wasn't mentioned on CTs previous that I can see.   -Normal LFTs  - outpatient GI evaluation.  Patient notes having prior abdominal surgery and lots of scarring.  Defer further work-up at this time.  Defer to PCP for further follow-up regarding this.     RA  Follows with rheumatology. Not currently on medications for this     BRENNEN  - intolerant of CPAP     COVID-19 negative     DVT Prophylaxis: Pneumatic Compression Devices  Code Status: DNR / DNI     Disposition: Expected discharge in 2-3 days     Clinically Significant Risk Factors Present on Admission               # Overweight: Estimated body mass index is 26.37 kg/m  as calculated from the following:    Height as of this encounter: 1.753 m (5' 9\").    Weight as of this encounter: 81 kg (178 lb 9.6 oz).          Ashley Langley MD  Text Page (7am - 6pm, M-F)  [facility]  Securely message with the Vocera Web Console (learn more here)  Text page " via [facility] Paging/Directory      Interval History   Stable overnight.  Does not feel significantly different in any way.  Still short of breath.  Is a bit more tremulous today which may be from the scheduled nebs.  No fevers.    -Data reviewed today: I reviewed all new labs and imaging results over the last 24 hours. I personally reviewed CT scan with result as noted above    Physical Exam   Temp: 97.7  F (36.5  C) Temp src: Oral BP: 131/62 Pulse: 75   Resp: 16 SpO2: 98 % O2 Device: Nasal cannula Oxygen Delivery: 3 LPM  Vitals:    10/15/22 0246   Weight: 81 kg (178 lb 9.6 oz)     Vital Signs with Ranges  Temp:  [97.4  F (36.3  C)-98.2  F (36.8  C)] 97.7  F (36.5  C)  Pulse:  [] 75  Resp:  [16] 16  BP: (119-138)/(59-77) 131/62  SpO2:  [92 %-98 %] 98 %  I/O last 3 completed shifts:  In: 480 [P.O.:480]  Out: -     Constitutional: Alert, appears comfortable, in no acute distress, mildly tremulous  Respiratory: Non labored breathing, poor air movement bilaterally  Cardiovascular: Heart sounds regular rate and rhythm, no murmurs, no leg edema  GI: Abdomen is soft, non distended, non tender. Normal BS  Skin/Integumen: no rashes, no pressure sores, some tenderness to palpation along left thigh muscles  Neuro: alert, converses appropriately, moving all extremities, fluent speech, no facial asymmetry  Psych: mood and affect appropriate      Medications       amLODIPine  5 mg Oral BID     aspirin  81 mg Oral BID     doxycycline hyclate  100 mg Oral BID     furosemide  20 mg Oral Daily     ipratropium - albuterol 0.5 mg/2.5 mg/3 mL  3 mL Nebulization TID     levothyroxine  137 mcg Oral Daily     lisinopril  40 mg Oral Daily     [START ON 10/17/2022] methylPREDNISolone  81.25 mg Intravenous Daily     metoprolol succinate ER  50 mg Oral Daily     rosuvastatin  10 mg Oral QPM     sodium chloride (PF)  3 mL Intracatheter Q8H       Data   Recent Labs   Lab 10/15/22  1027 10/15/22  0300   WBC  --  16.4*   HGB  --  15.4   MCV  --   90   PLT  --  203   NA  --  139   POTASSIUM  --  4.2   CHLORIDE  --  104   CO2  --  22   BUN  --  18   CR  --  0.95   ANIONGAP  --  13   KARLIE  --  9.2   GLC  --  140*   ALBUMIN 2.7*  --    PROTTOTAL 7.0  --    BILITOTAL 0.5  --    ALKPHOS 92  --    ALT 28  --    AST 13  --        Imaging  No results found for this or any previous visit (from the past 24 hour(s)).

## 2022-10-16 NOTE — PROGRESS NOTES
"  PULMONOLOGY PROGRESS NOTE    Date of Admission: 10/15/2022      SUBJECTIVE      Notes he was previously on home O2 for a period of time but stopped since he no longer needed it. Feels about the same today. Is slightly tremulous.     PHYSICAL EXAMINATION   Vital signs  Temp: 97.4  F (36.3  C) Temp src: Oral BP: 138/77 Pulse: 116   Resp: 16 SpO2: 95 % O2 Device: Nasal cannula Oxygen Delivery: 5 LPM Height: 175.3 cm (5' 9\") Weight: 81 kg (178 lb 9.6 oz)  Estimated body mass index is 26.37 kg/m  as calculated from the following:    Height as of this encounter: 1.753 m (5' 9\").    Weight as of this encounter: 81 kg (178 lb 9.6 oz).  I/O last 3 completed shifts:  In: 480 [P.O.:480]  Out: -     CONSTITUTIONAL/GENERAL: Alert male. Mildly tremulous but appears more comfortable than yesterday.   EARS, NOSE,THROAT,MOUTH: External ears and nose overall normal. NC in place.  RESPIRATORY: CTAB, diminished breath sounds.   CARDIOVASCULAR: RRR, S1, S2.   PSYCHIATRIC: Appropriate mood and affect.     LABORATORY ASSESSMENT    Arterial Blood Gas  Recent Labs   Lab 10/15/22  0619 10/15/22  0256   PH 7.35 7.34     CBC  Recent Labs   Lab 10/15/22  0300   WBC 16.4*   RBC 5.22   HGB 15.4   HCT 46.7   MCV 90   MCH 29.5   MCHC 33.0   RDW 12.8        BMP  Recent Labs   Lab 10/15/22  0300      POTASSIUM 4.2   CHLORIDE 104   KARLIE 9.2   CO2 22   BUN 18   CR 0.95   *     INRNo lab results found in last 7 days.   BNPNo lab results found in last 7 days.  VENOUS BLOOD GASES  Recent Labs   Lab 10/15/22  0619 10/15/22  0256   HCO3V 25 26       IMAGING, ECHO, PFT, SLEEP STUDY, RHC, OTHER TESTING         ASSESSMENT / PLAN      Pulmonary diagnoses (alphabetical):  Abnl CT/CXR R91.8  Adenopathy R59.9  COPD J44.9  Emphysema J43.9  Hypoxemia R09.02  Pneumonia unspec J18.9  Pulm nodule solit R91.1  Radiation pneum J70.0  Resp fail acute J96.00  Sleep apnea obstr G47.33  SOB R06.02      ASSESSMENT:  76 yo M with metastatic papillary " thyroid cancer, severe COPD (previously on home O2), RUL nodule s/p non-diagnostic VATS and empiric radiation (6/2022), BRENNEN (not on CPAP), RA, CAD s/p PCI (1997) presenting with dyspnea. Acute hypoxic respiratory failure. CT with progressed RUL consolidation, GGO, LLL consolidation and mediastinal lymphadenopathy.      Potential etiologies include infectious PNA, radiation pneumonitis, progression of malignancy, less likely COPD exacerbation as lungs sound clear. Radiation pneumonitis can manifest several months after completion of therapy.        PLAN:   Recommend Solumedrol 80mg daily (1mg/kg) for possible radiation pneumonitis and/or COPD exacerbation.  Space DuoNebs to Q8h. Avoid ICS in the setting of possible PNA.   Broad-spec antibiotics with atypical coverage.   Sputum culture if able to produce a sample.  CPAP when sleeping.  Walking oximetry prior to discharge. May need home O2.          Vinny Gaxiola MD    [facility] / [facility]  Office: [phone]  Pager: [phone]

## 2022-10-17 ENCOUNTER — APPOINTMENT (OUTPATIENT)
Dept: PHYSICAL THERAPY | Facility: CLINIC | Age: 77
DRG: 190 | End: 2022-10-17
Payer: MEDICARE

## 2022-10-17 PROCEDURE — 250N000013 HC RX MED GY IP 250 OP 250 PS 637: Performed by: INTERNAL MEDICINE

## 2022-10-17 PROCEDURE — 94640 AIRWAY INHALATION TREATMENT: CPT | Mod: 76

## 2022-10-17 PROCEDURE — 99233 SBSQ HOSP IP/OBS HIGH 50: CPT | Performed by: HOSPITALIST

## 2022-10-17 PROCEDURE — 250N000011 HC RX IP 250 OP 636: Performed by: HOSPITALIST

## 2022-10-17 PROCEDURE — 250N000013 HC RX MED GY IP 250 OP 250 PS 637: Performed by: HOSPITALIST

## 2022-10-17 PROCEDURE — 999N000157 HC STATISTIC RCP TIME EA 10 MIN

## 2022-10-17 PROCEDURE — 97116 GAIT TRAINING THERAPY: CPT | Mod: GP

## 2022-10-17 PROCEDURE — 250N000009 HC RX 250: Performed by: HOSPITALIST

## 2022-10-17 PROCEDURE — 120N000001 HC R&B MED SURG/OB

## 2022-10-17 PROCEDURE — 97530 THERAPEUTIC ACTIVITIES: CPT | Mod: GP

## 2022-10-17 RX ORDER — CEFUROXIME AXETIL 500 MG/1
500 TABLET ORAL EVERY 12 HOURS SCHEDULED
Status: DISCONTINUED | OUTPATIENT
Start: 2022-10-17 | End: 2022-10-21 | Stop reason: HOSPADM

## 2022-10-17 RX ORDER — SULFAMETHOXAZOLE/TRIMETHOPRIM 800-160 MG
1 TABLET ORAL
Status: DISCONTINUED | OUTPATIENT
Start: 2022-10-17 | End: 2022-10-17

## 2022-10-17 RX ORDER — SENNOSIDES 8.6 MG
1-2 TABLET ORAL DAILY
Status: DISCONTINUED | OUTPATIENT
Start: 2022-10-17 | End: 2022-10-21 | Stop reason: HOSPADM

## 2022-10-17 RX ADMIN — HYDROCODONE BITARTRATE AND ACETAMINOPHEN 2 TABLET: 5; 325 TABLET ORAL at 22:17

## 2022-10-17 RX ADMIN — DOXYCYCLINE HYCLATE 100 MG: 100 CAPSULE ORAL at 20:59

## 2022-10-17 RX ADMIN — LISINOPRIL 40 MG: 20 TABLET ORAL at 09:37

## 2022-10-17 RX ADMIN — ASPIRIN 81 MG: 81 TABLET, COATED ORAL at 09:38

## 2022-10-17 RX ADMIN — AMLODIPINE BESYLATE 5 MG: 5 TABLET ORAL at 09:38

## 2022-10-17 RX ADMIN — METHYLPREDNISOLONE SODIUM SUCCINATE 81.25 MG: 125 INJECTION, POWDER, FOR SOLUTION INTRAMUSCULAR; INTRAVENOUS at 09:37

## 2022-10-17 RX ADMIN — CEFUROXIME AXETIL 500 MG: 500 TABLET ORAL at 20:59

## 2022-10-17 RX ADMIN — HYDROCODONE BITARTRATE AND ACETAMINOPHEN 1 TABLET: 5; 325 TABLET ORAL at 07:27

## 2022-10-17 RX ADMIN — ASPIRIN 81 MG: 81 TABLET, COATED ORAL at 20:59

## 2022-10-17 RX ADMIN — LEVOTHYROXINE SODIUM 137 MCG: 112 TABLET ORAL at 09:37

## 2022-10-17 RX ADMIN — IPRATROPIUM BROMIDE AND ALBUTEROL SULFATE 3 ML: .5; 3 SOLUTION RESPIRATORY (INHALATION) at 19:11

## 2022-10-17 RX ADMIN — DOXYCYCLINE HYCLATE 100 MG: 100 CAPSULE ORAL at 09:37

## 2022-10-17 RX ADMIN — CEFUROXIME AXETIL 500 MG: 500 TABLET ORAL at 10:22

## 2022-10-17 RX ADMIN — HYDROCODONE BITARTRATE AND ACETAMINOPHEN 1 TABLET: 5; 325 TABLET ORAL at 13:16

## 2022-10-17 RX ADMIN — HYDROCODONE BITARTRATE AND ACETAMINOPHEN 1 TABLET: 5; 325 TABLET ORAL at 17:16

## 2022-10-17 RX ADMIN — METOPROLOL SUCCINATE 50 MG: 50 TABLET, EXTENDED RELEASE ORAL at 09:38

## 2022-10-17 RX ADMIN — ROSUVASTATIN CALCIUM 10 MG: 10 TABLET, FILM COATED ORAL at 20:59

## 2022-10-17 RX ADMIN — FUROSEMIDE 20 MG: 20 TABLET ORAL at 09:38

## 2022-10-17 RX ADMIN — AMLODIPINE BESYLATE 5 MG: 5 TABLET ORAL at 20:59

## 2022-10-17 RX ADMIN — HYDROCODONE BITARTRATE AND ACETAMINOPHEN 1 TABLET: 5; 325 TABLET ORAL at 02:08

## 2022-10-17 RX ADMIN — SENNOSIDES 2 TABLET: 8.6 TABLET, FILM COATED ORAL at 13:00

## 2022-10-17 RX ADMIN — IPRATROPIUM BROMIDE AND ALBUTEROL SULFATE 3 ML: .5; 3 SOLUTION RESPIRATORY (INHALATION) at 07:50

## 2022-10-17 ASSESSMENT — ACTIVITIES OF DAILY LIVING (ADL)
ADLS_ACUITY_SCORE: 18
ADLS_ACUITY_SCORE: 21
ADLS_ACUITY_SCORE: 21
ADLS_ACUITY_SCORE: 18
ADLS_ACUITY_SCORE: 21
ADLS_ACUITY_SCORE: 18
ADLS_ACUITY_SCORE: 21
ADLS_ACUITY_SCORE: 18

## 2022-10-17 NOTE — PROGRESS NOTES
"  PULMONOLOGY PROGRESS NOTE    Date of Admission: 10/15/2022      SUBJECTIVE      No new events overnight,  Afebrile. No worsening respiratory complaints at this time. Feels somewhat improved today. But gets short of breath with mild exertion.     PHYSICAL EXAMINATION   Vital signs  Temp: 97.8  F (36.6  C) Temp src: Oral BP: (!) 146/68 Pulse: 77   Resp: 18 SpO2: 94 % O2 Device: Nasal cannula Oxygen Delivery: 2 LPM Height: 175.3 cm (5' 9\") Weight: 81 kg (178 lb 9.6 oz)  Estimated body mass index is 26.37 kg/m  as calculated from the following:    Height as of this encounter: 1.753 m (5' 9\").    Weight as of this encounter: 81 kg (178 lb 9.6 oz).  No intake/output data recorded.    CONSTITUTIONAL/GENERAL: Alert male. Mildly tremulous but appears more comfortable than yesterday.   EARS, NOSE,THROAT,MOUTH: External ears and nose overall normal. NC in place.  RESPIRATORY: CTAB, diminished breath sounds.   CARDIOVASCULAR: RRR, S1, S2.   PSYCHIATRIC: Appropriate mood and affect.     LABORATORY ASSESSMENT    Arterial Blood Gas  Recent Labs   Lab 10/15/22  0619 10/15/22  0256   PH 7.35 7.34     CBC  Recent Labs   Lab 10/15/22  0300   WBC 16.4*   RBC 5.22   HGB 15.4   HCT 46.7   MCV 90   MCH 29.5   MCHC 33.0   RDW 12.8        BMP  Recent Labs   Lab 10/15/22  0300      POTASSIUM 4.2   CHLORIDE 104   KARLIE 9.2   CO2 22   BUN 18   CR 0.95   *     INRNo lab results found in last 7 days.   BNPNo lab results found in last 7 days.  VENOUS BLOOD GASES  Recent Labs   Lab 10/15/22  0619 10/15/22  0256   HCO3V 25 26       IMAGING, ECHO, PFT, SLEEP STUDY, RHC, OTHER TESTING                                    Ct Chest 10/15    IMPRESSION:  1.  No pulmonary embolism.   2.  Emphysematous change.   3.  Previously described 1.5 cm nodule in the right upper lobe is not seen due to surrounding consolidative changes which have progressed. In addition, there is new subpleural consolidation within the left lower lobe with tiny " pleural effusion. Increased   interstitial changes throughout the lungs with underlying emphysematous change.   4.  Progression of mediastinal lymphadenopathy.   5.  Cirrhosis. Postsurgical changes in the right hepatic lobe.   6.  Cholelithiasis.                                  ASSESSMENT / PLAN      Pulmonary diagnoses (alphabetical):  Abnl CT/CXR R91.8  Adenopathy R59.9  COPD J44.9  Emphysema J43.9  Hypoxemia R09.02  Pneumonia unspec J18.9  Pulm nodule solit R91.1  Radiation pneum J70.0  Resp fail acute J96.00  Sleep apnea obstr G47.33  SOB R06.02      ASSESSMENT:  78 yo M with metastatic papillary thyroid cancer, severe COPD (previously on home O2), RUL nodule s/p non-diagnostic VATS and empiric radiation (6/2022), BRENNEN (not on CPAP), RA, CAD s/p PCI (1997) presenting with dyspnea. Acute hypoxic respiratory failure. CT with progressed RUL consolidation, GGO, LLL consolidation and mediastinal lymphadenopathy.      Potential etiologies include infectious PNA, radiation pneumonitis, progression of malignancy, less likely COPD exacerbation as lungs sound clear. Radiation pneumonitis can manifest several months after completion of therapy.        PLAN:   Recommend Solumedrol 80mg daily (1mg/kg) for possible radiation pneumonitis and/or COPD exacerbation.  Strongly encourage physical therapy due to significant deconditioning  Use incentive spirometer as much as possible multiple times an hour while awake  Will likely need Slow taper and discharge on [id number] mg once per day decreasing by 10 mg every 2 weeks until 10 mg (continue 10 mg dose until seen by pulmonary).  Will need PJP prophylaxis while >20 mg of pred  Space DuoNebs to Q8h.  Restart Trelegy at discharge  Broad-spec antibiotics with atypical coverage.   Sputum culture if able to produce a sample.  CPAP when sleeping.  Walking oximetry prior to discharge. Likely will need home O2 at discharge  Follow up with Dr Dumont and Pulmonary with Primary pulmonary  provider at [facility]  Please call for questins  Discharge per internal medicine will likely need to have home oxygen arranged       Brennan Palm M.D.  Pulmonary, Critical Care and Sleep Medicine  [facility]  Pager: [phone]  Office:[phone]

## 2022-10-17 NOTE — PROGRESS NOTES
"[facility]    Hospitalist Progress Note    Date of Admission:  10/15/2022    Assessment & Plan   77-year-old male with known COPD, RUL nodule s/p non-diagnostic VATS and empiric radiation, metastatic papillary thyroid cancer who presented with worsening shortness of breath and 2 falls at home.    Acute respiratory failure with hypoxia  Possible COPD exacerbation versus community-acquired pneumonia versus radiation pneumonitis versus progression of malignancy  Leukocytosis  *Has had \"2-3 months\" SOB worsening recently, some relief with albuterol. This evening woke with marked SOB, EMS found O2 sats 66%. Responded to CPAP. No fevers, cough, chest pain. In EDafebrile, O2 sats 95% off BiPAP at time of my visit. Lactic 3.6, WBC 16.4. BNP, troponin normal. VBG 7.34/48/32/26. influenza and COVID-19 negative. EKG ST, no ischemic changes.   *CT chest w/o PE, consolidative changes around R upper lobe nodule, also new subpleural consolidation in LLL., emphyysema, worsening mediastinal LAD  - telemetry  -Appreciate pulmonology consultation.  Per their recommendations, patient is on Solu-Medrol 80 Mg daily for possible radiation pneumonitis and/or COPD exacerbation.  Will likely need slow taper and discharged on prednisone 40 Mg once per day decreasing by 10 Mg every 2 weeks until 10 Mg [continue 10 Mg dose until seen by pulmonary].  -Continue antibiotics: Ceftin and doxycycline for pneumonia coverage  - duonebs q8 hours scheduled  - q2 hour albuterol nebs prn  -Trelegy discontinued per pulmonology in setting of pneumonia.  Restart at discharge.  -Follow up with Dr Dumont and Pulmonary with Primary pulmonary provider at [facility]     Fall with L leg pain  Prior left [facility]ETH  Chronic back pain  States has had balance issues with his L leg for awhile. Fell twice on day of presentation when leg gave out, now worsening pain. No head trauma or neck pain. XR hip w/o fracture.   On chart review, he " does have history of chronic bilateral hip pain, worse on the left.  States that he recently followed up with [facility] and had an MRI.  Apparently was called and told that he may need an aspiration.  - prn analgesics  - XR knee and pelvis with left hip with no acute findings  - PT  -[facility] consulted here.  Recommend holding off on any aspirate at this time till current infection is treated.  Follow-up with primary orthopedic surgeon, Dr. Brito as outpatient.     CAD with hx Cx stenting 1997  HTN  HLD  [amlodipine 5 mg daily, aspirin 81 mg daily, furosemide 20 mg daily, lisinopril 40 mg daily, metoprolol XL 50 mg day, rosuvastatin 10 mg daily]  - resume meds      Lung nodule  *Has lung nodule, PET positive. Attempt at VATS with resection unsuccessful 2/2 scar tissue. Has now undergone XRT treatment to the area.   *CT chest on admit w/o PE, consolidative changes around R upper lobe nodule, also new subpleural consolidation in LLL., emphysema, progression of mediastinal LAD.   - pulmonary consult as above.  Concern for progression of malignancy in differential.     Metastatic papillary thyroid cancer  Hypothyroidism  S/p total thyroidectomy 8/2021 with 4/4 LN positive. Treated with radioactive iodine ablation 9/2021.   - resume levothyroxine 137 mcg daily      Cirrhosis  Noted on CT on admission. This wasn't mentioned on CTs previous that I can see.   -Normal LFTs  - outpatient GI evaluation.  Patient notes having prior abdominal surgery and lots of scarring.  Defer further work-up at this time.  Defer to PCP for further follow-up regarding this.     RA  Follows with rheumatology. Not currently on medications for this     BRENNEN  - intolerant of CPAP     COVID-19 negative     DVT Prophylaxis: Pneumatic Compression Devices  Code Status: DNR / DNI     Disposition: Expected discharge in 1-2 days     Clinically Significant Risk Factors Present on Admission               # Overweight: Estimated body mass index is 26.37 kg/m  as  "calculated from the following:    Height as of this encounter: 1.753 m (5' 9\").    Weight as of this encounter: 81 kg (178 lb 9.6 oz).          Ashley Langley MD  Text Page (7am - 6pm, M-F)  [facility]  Securely message with the Vocera Web Console (learn more here)  Text page via [facility] Paging/Directory      Interval History   Stable overnight.  Feeling slightly better but still very deconditioned and dyspneic with exertion.  Also still having quite a bit of pain in his left hip that also inhibits his mobility.  Answered questions regarding why hip aspirate was being deferred at this time.  He also reiterated to me that he does not feel ready to discharge from the hospital, is afraid of discharging too soon as apparently he had 2 friends who  of pneumonia and he believes that they were let go from the hospital to soon.  I reiterated to him that he is deconditioned and still recovering but he is approaching medical stability with stable oxygen requirements, no fevers, acceptable blood work and vitals.  We discussed possibility of discharge over the next day or 2.    -Data reviewed today: I reviewed all new labs and imaging results over the last 24 hours. I personally reviewed CT scan with result as noted above    Physical Exam   Temp: 97.5  F (36.4  C) Temp src: Oral BP: (!) 142/72 Pulse: 80   Resp: 18 SpO2: 96 % O2 Device: Nasal cannula with humidification Oxygen Delivery: 2 LPM  Vitals:    10/15/22 0246   Weight: 81 kg (178 lb 9.6 oz)     Vital Signs with Ranges  Temp:  [97.5  F (36.4  C)-97.8  F (36.6  C)] 97.5  F (36.4  C)  Pulse:  [63-80] 80  Resp:  [16-18] 18  BP: (131-146)/(62-72) 142/72  SpO2:  [94 %-98 %] 96 %  No intake/output data recorded.    Constitutional: Alert, appears comfortable, in no acute distress, appears weak and deconditioned  Respiratory: Non labored breathing, poor air movement bilaterally  Cardiovascular: Heart sounds regular rate and rhythm, no murmurs, no leg " edema  GI: Abdomen is soft, non distended, non tender. Normal BS  Skin/Integumen: no rashes, no pressure sores, some tenderness to palpation along left thigh muscles  Neuro: alert, converses appropriately, moving all extremities, fluent speech, no facial asymmetry  Psych: mood and affect appropriate      Medications       amLODIPine  5 mg Oral BID     aspirin  81 mg Oral BID     cefuroxime  500 mg Oral Q12H [facility] (08/20)     doxycycline hyclate  100 mg Oral BID     furosemide  20 mg Oral Daily     ipratropium - albuterol 0.5 mg/2.5 mg/3 mL  3 mL Nebulization TID     levothyroxine  137 mcg Oral Daily     lisinopril  40 mg Oral Daily     methylPREDNISolone  81.25 mg Intravenous Daily     metoprolol succinate ER  50 mg Oral Daily     rosuvastatin  10 mg Oral QPM     sennosides  1-2 tablet Oral Daily     sodium chloride (PF)  3 mL Intracatheter Q8H       Data   Recent Labs   Lab 10/15/22  1027 10/15/22  0300   WBC  --  16.4*   HGB  --  15.4   MCV  --  90   PLT  --  203   NA  --  139   POTASSIUM  --  4.2   CHLORIDE  --  104   CO2  --  22   BUN  --  18   CR  --  0.95   ANIONGAP  --  13   KARLIE  --  9.2   GLC  --  140*   ALBUMIN 2.7*  --    PROTTOTAL 7.0  --    BILITOTAL 0.5  --    ALKPHOS 92  --    ALT 28  --    AST 13  --        Imaging  No results found for this or any previous visit (from the past 24 hour(s)).

## 2022-10-18 ENCOUNTER — APPOINTMENT (OUTPATIENT)
Dept: PHYSICAL THERAPY | Facility: CLINIC | Age: 77
DRG: 190 | End: 2022-10-18
Payer: MEDICARE

## 2022-10-18 PROCEDURE — 94640 AIRWAY INHALATION TREATMENT: CPT

## 2022-10-18 PROCEDURE — 97530 THERAPEUTIC ACTIVITIES: CPT | Mod: GP

## 2022-10-18 PROCEDURE — 94640 AIRWAY INHALATION TREATMENT: CPT | Mod: 76

## 2022-10-18 PROCEDURE — 250N000013 HC RX MED GY IP 250 OP 250 PS 637: Performed by: INTERNAL MEDICINE

## 2022-10-18 PROCEDURE — 250N000009 HC RX 250: Performed by: HOSPITALIST

## 2022-10-18 PROCEDURE — 120N000001 HC R&B MED SURG/OB

## 2022-10-18 PROCEDURE — 272N000202 HC AEROBIKA WITH MANOMETER

## 2022-10-18 PROCEDURE — 999N000157 HC STATISTIC RCP TIME EA 10 MIN

## 2022-10-18 PROCEDURE — 99233 SBSQ HOSP IP/OBS HIGH 50: CPT | Performed by: HOSPITALIST

## 2022-10-18 PROCEDURE — G0463 HOSPITAL OUTPT CLINIC VISIT: HCPCS

## 2022-10-18 PROCEDURE — 999N000032 HC STATISTIC CHRONIC DISEASE SPECIALIST RT CONSULT

## 2022-10-18 PROCEDURE — 250N000013 HC RX MED GY IP 250 OP 250 PS 637: Performed by: HOSPITALIST

## 2022-10-18 PROCEDURE — 999N000033 HC STATISTIC CHRONIC PULMONARY DISEASE SPECIALIST

## 2022-10-18 PROCEDURE — 250N000011 HC RX IP 250 OP 636: Performed by: HOSPITALIST

## 2022-10-18 PROCEDURE — 97116 GAIT TRAINING THERAPY: CPT | Mod: GP

## 2022-10-18 RX ORDER — ACETAMINOPHEN 325 MG/1
975 TABLET ORAL 3 TIMES DAILY
Status: DISCONTINUED | OUTPATIENT
Start: 2022-10-18 | End: 2022-10-21 | Stop reason: HOSPADM

## 2022-10-18 RX ORDER — OXYCODONE HYDROCHLORIDE 5 MG/1
5-10 TABLET ORAL EVERY 4 HOURS PRN
Status: DISCONTINUED | OUTPATIENT
Start: 2022-10-18 | End: 2022-10-21 | Stop reason: HOSPADM

## 2022-10-18 RX ADMIN — LISINOPRIL 40 MG: 20 TABLET ORAL at 09:30

## 2022-10-18 RX ADMIN — CEFUROXIME AXETIL 500 MG: 500 TABLET ORAL at 20:02

## 2022-10-18 RX ADMIN — DOXYCYCLINE HYCLATE 100 MG: 100 CAPSULE ORAL at 20:02

## 2022-10-18 RX ADMIN — METOPROLOL SUCCINATE 50 MG: 50 TABLET, EXTENDED RELEASE ORAL at 09:31

## 2022-10-18 RX ADMIN — OXYCODONE HYDROCHLORIDE 5 MG: 5 TABLET ORAL at 17:10

## 2022-10-18 RX ADMIN — SENNOSIDES 1 TABLET: 8.6 TABLET, FILM COATED ORAL at 09:36

## 2022-10-18 RX ADMIN — FUROSEMIDE 20 MG: 20 TABLET ORAL at 09:29

## 2022-10-18 RX ADMIN — AMLODIPINE BESYLATE 5 MG: 5 TABLET ORAL at 09:29

## 2022-10-18 RX ADMIN — DOXYCYCLINE HYCLATE 100 MG: 100 CAPSULE ORAL at 09:30

## 2022-10-18 RX ADMIN — ASPIRIN 81 MG: 81 TABLET, COATED ORAL at 09:30

## 2022-10-18 RX ADMIN — ACETAMINOPHEN 975 MG: 325 TABLET, FILM COATED ORAL at 16:41

## 2022-10-18 RX ADMIN — HYDROCODONE BITARTRATE AND ACETAMINOPHEN 1 TABLET: 5; 325 TABLET ORAL at 12:58

## 2022-10-18 RX ADMIN — IPRATROPIUM BROMIDE AND ALBUTEROL SULFATE 3 ML: .5; 3 SOLUTION RESPIRATORY (INHALATION) at 19:52

## 2022-10-18 RX ADMIN — ROSUVASTATIN CALCIUM 10 MG: 10 TABLET, FILM COATED ORAL at 20:02

## 2022-10-18 RX ADMIN — SENNOSIDES AND DOCUSATE SODIUM 1 TABLET: 50; 8.6 TABLET ORAL at 06:33

## 2022-10-18 RX ADMIN — LEVOTHYROXINE SODIUM 137 MCG: 112 TABLET ORAL at 09:35

## 2022-10-18 RX ADMIN — IPRATROPIUM BROMIDE AND ALBUTEROL SULFATE 3 ML: .5; 3 SOLUTION RESPIRATORY (INHALATION) at 08:10

## 2022-10-18 RX ADMIN — HYDROCODONE BITARTRATE AND ACETAMINOPHEN 1 TABLET: 5; 325 TABLET ORAL at 06:33

## 2022-10-18 RX ADMIN — IPRATROPIUM BROMIDE AND ALBUTEROL SULFATE 3 ML: .5; 3 SOLUTION RESPIRATORY (INHALATION) at 13:45

## 2022-10-18 RX ADMIN — METHYLPREDNISOLONE SODIUM SUCCINATE 81.25 MG: 125 INJECTION, POWDER, FOR SOLUTION INTRAMUSCULAR; INTRAVENOUS at 09:29

## 2022-10-18 RX ADMIN — CEFUROXIME AXETIL 500 MG: 500 TABLET ORAL at 09:29

## 2022-10-18 RX ADMIN — ASPIRIN 81 MG: 81 TABLET, COATED ORAL at 20:02

## 2022-10-18 RX ADMIN — ACETAMINOPHEN 975 MG: 325 TABLET, FILM COATED ORAL at 21:49

## 2022-10-18 RX ADMIN — AMLODIPINE BESYLATE 5 MG: 5 TABLET ORAL at 20:03

## 2022-10-18 RX ADMIN — OXYCODONE HYDROCHLORIDE 10 MG: 5 TABLET ORAL at 22:40

## 2022-10-18 RX ADMIN — HYDROCODONE BITARTRATE AND ACETAMINOPHEN 1 TABLET: 5; 325 TABLET ORAL at 02:18

## 2022-10-18 ASSESSMENT — ACTIVITIES OF DAILY LIVING (ADL)
ADLS_ACUITY_SCORE: 20
ADLS_ACUITY_SCORE: 21
ADLS_ACUITY_SCORE: 20
ADLS_ACUITY_SCORE: 21
ADLS_ACUITY_SCORE: 20
ADLS_ACUITY_SCORE: 21
ADLS_ACUITY_SCORE: 20
ADLS_ACUITY_SCORE: 21

## 2022-10-18 NOTE — PROGRESS NOTES
"[facility]    Hospitalist Progress Note    Date of Admission:  10/15/2022    Assessment & Plan   77-year-old male with known COPD, RUL nodule s/p non-diagnostic VATS and empiric radiation, metastatic papillary thyroid cancer who presented with worsening shortness of breath and 2 falls at home.    Acute respiratory failure with hypoxia  Possible COPD exacerbation versus community-acquired pneumonia versus radiation pneumonitis versus progression of malignancy  Leukocytosis  *Has had \"2-3 months\" SOB worsening recently, some relief with albuterol. This evening woke with marked SOB, EMS found O2 sats 66%. Responded to CPAP. No fevers, cough, chest pain. In EDafebrile, O2 sats 95% off BiPAP at time of my visit. Lactic 3.6, WBC 16.4. BNP, troponin normal. VBG 7.34/48/32/26. influenza and COVID-19 negative. EKG ST, no ischemic changes.   *CT chest w/o PE, consolidative changes around R upper lobe nodule, also new subpleural consolidation in LLL., emphyysema, worsening mediastinal LAD  - telemetry  -Appreciate pulmonology consultation.  Per their recommendations, patient is on Solu-Medrol 80 Mg daily for possible radiation pneumonitis and/or COPD exacerbation.  Will likely need slow taper and discharged on prednisone 40 Mg once per day decreasing by 10 Mg every 2 weeks until 10 Mg [continue 10 Mg dose until seen by pulmonary].  -Continue antibiotics: Ceftin and doxycycline for pneumonia coverage  - duonebs q8 hours scheduled  - q2 hour albuterol nebs prn  -Trelegy discontinued per pulmonology in setting of pneumonia.  Restart at discharge.  -Follow up with Dr Dumont and Pulmonary with Primary pulmonary provider at [facility]  -Seen by COPD education on 10/18.  Recommended albuterol nebs at discharge.     Fall with L leg pain  Prior left [facility]  Chronic back pain  States has had balance issues with his L leg for awhile. Fell twice on day of presentation when leg gave out, now worsening " pain. No head trauma or neck pain. XR hip w/o fracture.   On chart review, he does have history of chronic bilateral hip pain, worse on the left.  States that he recently followed up with [facility] and had an MRI.  Apparently was called and told that he may need an aspiration.  - prn analgesics.  Pain continues to be a significant issue.  We will change analgesics to scheduled high-dose Tylenol 3 times daily and as needed oxycodone 5 to 10 mg every 4 hours.  - XR knee and pelvis with left hip with no acute findings  - PT  -[facility] consulted here.  Recommend holding off on any aspirate at this time till current infection is treated.  Follow-up with primary orthopedic surgeon, Dr. Brito as outpatient.     CAD with hx Cx stenting 1997  HTN  HLD  [amlodipine 5 mg daily, aspirin 81 mg daily, furosemide 20 mg daily, lisinopril 40 mg daily, metoprolol XL 50 mg day, rosuvastatin 10 mg daily]  - resume meds      Lung nodule  *Has lung nodule, PET positive. Attempt at VATS with resection unsuccessful 2/2 scar tissue. Has now undergone XRT treatment to the area.   *CT chest on admit w/o PE, consolidative changes around R upper lobe nodule, also new subpleural consolidation in LLL., emphysema, progression of mediastinal LAD.   - pulmonary consult as above.  Concern for progression of malignancy in differential.     Metastatic papillary thyroid cancer  Hypothyroidism  S/p total thyroidectomy 8/2021 with 4/4 LN positive. Treated with radioactive iodine ablation 9/2021.   - resume levothyroxine 137 mcg daily      Cirrhosis  Noted on CT on admission. This wasn't mentioned on CTs previous that I can see.   -Normal LFTs  - outpatient GI evaluation.  Patient notes having prior abdominal surgery and lots of scarring.  Defer further work-up at this time.  Defer to PCP for further follow-up regarding this.     RA  Follows with rheumatology. Not currently on medications for this     BRENNEN  - intolerant of CPAP     COVID-19 negative     DVT  "Prophylaxis: Pneumatic Compression Devices  Code Status: DNR / DNI     Disposition: Expected discharge in 1-2 days     Clinically Significant Risk Factors Present on Admission               # Overweight: Estimated body mass index is 26.37 kg/m  as calculated from the following:    Height as of this encounter: 1.753 m (5' 9\").    Weight as of this encounter: 81 kg (178 lb 9.6 oz).          Ashley Langley MD  Text Page (7am - 6pm, M-F)  [facility]  Securely message with the Vocera Web Console (learn more here)  Text page via [employer] Paging/Directory      Interval History   Stable overnight.  Patient feels very anxious about discharging too soon.  He does not want to discharge even tomorrow.  States he is afraid to go home.  Discussed TCU option with his family.  He still continues to have the left lower extremity chronic pain.  No fevers.    -Data reviewed today: I reviewed all new labs and imaging results over the last 24 hours. I personally reviewed CT scan with result as noted above    Physical Exam   Temp: 97.4  F (36.3  C) Temp src: Oral BP: (!) 145/71 Pulse: 75   Resp: 16 SpO2: 94 % O2 Device: Nasal cannula Oxygen Delivery: 1 LPM  Vitals:    10/15/22 0246   Weight: 81 kg (178 lb 9.6 oz)     Vital Signs with Ranges  Temp:  [97.4  F (36.3  C)-97.6  F (36.4  C)] 97.4  F (36.3  C)  Pulse:  [55-75] 75  Resp:  [16] 16  BP: (145-158)/(71-79) 145/71  SpO2:  [94 %-97 %] 94 %  I/O last 3 completed shifts:  In: 280 [P.O.:280]  Out: -     Constitutional: Alert, appears comfortable, in no acute distress, appears weak and deconditioned  Respiratory: Non labored breathing, poor air movement bilaterally  Cardiovascular: Heart sounds regular rate and rhythm, no murmurs, no leg edema  GI: Abdomen is soft, non distended, non tender. Normal BS  Skin/Integumen: no rashes, no pressure sores, some tenderness to palpation along left thigh muscles  Neuro: alert, converses appropriately, moving all extremities, " fluent speech, no facial asymmetry  Psych: mood and affect appropriate      Medications       amLODIPine  5 mg Oral BID     aspirin  81 mg Oral BID     cefuroxime  500 mg Oral Q12H JULIETA (08/20)     doxycycline hyclate  100 mg Oral BID     furosemide  20 mg Oral Daily     ipratropium - albuterol 0.5 mg/2.5 mg/3 mL  3 mL Nebulization TID     levothyroxine  137 mcg Oral Daily     lisinopril  40 mg Oral Daily     methylPREDNISolone  81.25 mg Intravenous Daily     metoprolol succinate ER  50 mg Oral Daily     rosuvastatin  10 mg Oral QPM     sennosides  1-2 tablet Oral Daily     sodium chloride (PF)  3 mL Intracatheter Q8H       Data   Recent Labs   Lab 10/15/22  1027 10/15/22  0300   WBC  --  16.4*   HGB  --  15.4   MCV  --  90   PLT  --  203   NA  --  139   POTASSIUM  --  4.2   CHLORIDE  --  104   CO2  --  22   BUN  --  18   CR  --  0.95   ANIONGAP  --  13   KARLIE  --  9.2   GLC  --  140*   ALBUMIN 2.7*  --    PROTTOTAL 7.0  --    BILITOTAL 0.5  --    ALKPHOS 92  --    ALT 28  --    AST 13  --        Imaging  No results found for this or any previous visit (from the past 24 hour(s)).

## 2022-10-18 NOTE — CONSULTS
"Chronic Pulmonary Disease Specialist Consult   COPD Initial Interview    10/18/2022    Patient: Harrison Thomas      :  1945                    MRN:[id number]      Reason for Consult:  Consulted to provide COPD education and optimize treatment regimen. Patient with severe COPD being followed by COPD Readmission Reduction Program    History of Present Illness: 77-year-old male with known COPD, RUL nodule s/p non-diagnostic VATS and empiric radiation, metastatic papillary thyroid cancer who presented with worsening shortness of breath and 2 falls at home.    *Has had \"2-3 months\" SOB worsening recently, some relief with albuterol. Pulmonology consultation. Per their recommendations, patient is on Solu-Medrol 80 Mg daily for possible radiation pneumonitis and/or COPD exacerbation.      Smoking Hx: Quit in        Pulmonologist/Last office visit 12/10/21     Most recent  PFT/interpretation on:         FVC 2.44/61%   FEV1 1.33/44%   FEV1/FVC   (Ratio)   55%   DLCO Moderate diffusion defect   Interpretation Severe obstruction       Relevant Sleep Studies: Had sleep study on 18 at [facility] in [place].     Results showed sleep associated Hypoxemia. Lowest O2 saturation was 66%. Time spent less than or equal to 88% was 9.5 minutes. Time spent less than or equal to 89% was 11 minutes.     Hypoventilation not present; combined AHI was 19.9 events per hour    Moderate sleep sleep disordered breathing with obstruction any hypoxemia.     **Patient did not pursue CPAP, does not tolerate mask.      Home Oxygen Use: Notes he was previously on home O2 for a period of time but stopped, stated that his co-pay of $66/month was too much so he called to have the [employer]  the concentrator. Agreeable to home oxygen at discharge.       Vaccinations:     Influenza: Patient is up to date  Pneumococcal: Patient is up to date  COVID: Patient has had the Covid vaccine and is fully boosted    Pulmonary " Rehab History: Stated he went to PT in the past for his L hip but stopped due to pain. Quit 1 month ago       Home respiratory medications include:      -Albuterol (Proair/Ventolin/Proventolin)  Trelegy Ellipta (ICS/LABA/LAMA)      Assessment: SPO2 94% on 1.5 LPM, HR 82, /71, BBS clear throughout all lobes     Patient became very tearful during this consult saying he feels terrible about his wife having to do everything. He says he feels like he can't do things or go places. He places high importance on attending his grand-childrens sporting events.     Action:         Evaluated patients inspiratory flow using In-Check device:     --Low resistance setting: Patient able to generate 100LPM, which is excessive inspiratory flow for his albuterol rescue inhaler.  Albuterol inhalers require a slow inhalation of 20-60 LPM for optimal drug disposition with 5-10 second breath hold.      --Medium resistance setting: Patient able to generate 70 LPM, which is sufficient inspiratory flow for his Trelegy DPI. Medium resistance inhalers require a fast and deep inhalation of 30-80LPM with 5-10 second breath hold.       -Evaluated patients' coordination and technique with inhaler: Patient demonstrated good technique with his inhalers. Educated patient on proper inspiratory flows needed for all his inhalers. Provided and instructed patient on proper use of Aerochamber spacer with inhaler; reiterated that spacer should always be used with his rescue inhaler.       -Patient able to generate a pressure of 10 cm H2O on [employer]ika OPEP device for *1-2 seconds generating good strong non-productive cough. The goal for each breath, for a total of 3 sets of 10 breaths, is to exhale at a of pressure 10-20 for 3-4 seconds without fatigue. Educated patient to perform 2 to 3 'handy coughs'  to clear airway after each set.  Shared that consistent use of this device will help open smaller airways, improve mucus clearance, decrease cough  "frequency, and improve exercise tolerance.     Pulmonary Recommendations:    Avoid ICS in the setting of possible PNA.     Broad-spec antibiotics with atypical coverage.     Sputum culture if able to produce a sample.    CPAP when sleeping.    Walking oximetry prior to discharge. May need home O2.     Recommendations:    -Continue with current inpatient respiratory medication schedule    Duo TID    Overnight oximetry study on RA to test for nocturnal hypoxia.       -Consider Albuterol solution with prescription for nebulizer compressor for home use. Educated pt on optimizing lungs for the day by starting out with Albuterol inhaler or neb.       - Has follow up appointment with Pulmonologist, Dr. Handy on 12/30/22 with complete PFT's. *Will instruct patient to discuss continued use of ICS in the setting of pneumonia.       -Use Aerobika Oscillating PEP Device for 3 sets of 10 breaths two times daily as directed above      -PT/OT consult to assess safety with returning home, functional abilities and limitations, home care needs.      -Referral for outpatient pulmonary Rehab      -Patient needs nebulizer compressor at discharge with prescription for tubing, cups and mask. Per insurance requirements, Physician documentation in F2F notes needs to include dx diagnosis and need for nebulizer and medication frequency.      -Home Oxygen Assessment Testing 24-48 hours prior to discharge to determine O2 needs at rest and with exertion/activity. If the patient requires oxygen at rest, the walk test must be performed using the new baseline O2 to determine if patient needs more oxygen with activity.       -In the event patient qualifies for oxygen, at rest or with activity, please use the following verbiage in oxygen order: \"Please provide portable oxygen concentrator AND please test for oxygen conserving device using ____LPM to maintain sats between ____)      -Patient is a good candidate for COPD Action Plan.       -At " discharge continue with patient's home regimen of respiratory medications.       I spent 60 minutes with the patient.    -Will continue to follow patient, assist bedside RT, RN CC, SW, and treatment team with specific respiratory discharge needs and IP recommendations.       Carly Smith, RRT, CTTS  Chronic Pulmonary Disease Specialist  Office: [phone]  Pager: [phone]  Hours: Tuesday-Thursday, 8-4:30

## 2022-10-18 NOTE — CONSULTS
Care Management Initial Consult    General Information  Assessment completed with: Patient, Spouse or significant other, Harrison & Adeola  Type of CM/SW Visit: Initial Assessment    Primary Care Provider verified and updated as needed: Yes   Readmission within the last 30 days: no previous admission in last 30 days      Reason for Consult: discharge planning  Advance Care Planning:            Communication Assessment  Patient's communication style: spoken language (English or Bilingual)    Hearing Difficulty or Deaf: no   Wear Glasses or Blind: yes    Cognitive  Cognitive/Neuro/Behavioral: WDL                      Living Environment:   People in home: spouse  Adeola  Current living Arrangements: house      Able to return to prior arrangements: yes       Family/Social Support:  Care provided by: self, spouse/significant other  Provides care for:    Marital Status:   Wife  Adeola       Description of Support System: Supportive, Involved         Current Resources:   Patient receiving home care services: No     Community Resources: None  Equipment currently used at home: none  Supplies currently used at home: None    Employment/Financial:  Employment Status: retired        Financial Concerns:             Lifestyle & Psychosocial Needs:  Social Determinants of Health     Tobacco Use: Medium Risk     Smoking Tobacco Use: Former     Smokeless Tobacco Use: Never     Passive Exposure: Not on file   Alcohol Use: Not on file   Financial Resource Strain: Not on file   Food Insecurity: Not on file   Transportation Needs: Not on file   Physical Activity: Not on file   Stress: Not on file   Social Connections: Not on file   Intimate Partner Violence: Not on file   Depression: At risk     PHQ-2 Score: 3   Housing Stability: Not on file       Functional Status:  Prior to admission patient needed assistance:              Mental Health Status:          Chemical Dependency Status:                Values/Beliefs:  Spiritual, Cultural  Beliefs, Jehovah's witness Practices, Values that affect care: no               Additional Information:  Per consult for elevated risk of readmission and discharge planning, met with patient to discuss discharge plan.  Per pt, prior to discharge, he has been independent with all ADLs, not requiring any equipment with ambulation and is still able to drive.  Discussed that pt remains on Oxygen and that pt would be assessed for possible need for home cont. Or at noc oxygen.  Discussed that pt will need a neb machine at discharge.  Discussed PT recommends OP PT and pt in agreement with this plan.  Patient concerned about discharging home too early and ongoing shoulder and hip pain.  Discussed with pt's bedside RN.  Discussed that PCP appointment will be scheduled prior to discharge.  PCP follow-up scheduled via [facility].  Inpatient Care Coordinator will continue to follow for discharge needs of neb machine, home oxgen  ROGERS Estes RN, BSN, OCN   Inpatient Care Coordination [address]  Office: [phone]

## 2022-10-19 ENCOUNTER — APPOINTMENT (OUTPATIENT)
Dept: PHYSICAL THERAPY | Facility: CLINIC | Age: 77
DRG: 190 | End: 2022-10-19
Payer: MEDICARE

## 2022-10-19 ENCOUNTER — MEDICAL CORRESPONDENCE (OUTPATIENT)
Dept: HEALTH INFORMATION MANAGEMENT | Facility: CLINIC | Age: 77
End: 2022-10-19

## 2022-10-19 LAB
ANION GAP SERPL CALCULATED.3IONS-SCNC: 6 MMOL/L (ref 3–14)
BUN SERPL-MCNC: 32 MG/DL (ref 7–30)
CALCIUM SERPL-MCNC: 8.8 MG/DL (ref 8.5–10.1)
CHLORIDE BLD-SCNC: 110 MMOL/L (ref 94–109)
CO2 SERPL-SCNC: 26 MMOL/L (ref 20–32)
CREAT SERPL-MCNC: 0.82 MG/DL (ref 0.66–1.25)
CRP SERPL-MCNC: 8.1 MG/L (ref 0–8)
ERYTHROCYTE [DISTWIDTH] IN BLOOD BY AUTOMATED COUNT: 13 % (ref 10–15)
GFR SERPL CREATININE-BSD FRML MDRD: >90 ML/MIN/1.73M2
GLUCOSE BLD-MCNC: 93 MG/DL (ref 70–99)
HCT VFR BLD AUTO: 40.5 % (ref 40–53)
HGB BLD-MCNC: 12.9 G/DL (ref 13.3–17.7)
MCH RBC QN AUTO: 29.1 PG (ref 26.5–33)
MCHC RBC AUTO-ENTMCNC: 31.9 G/DL (ref 31.5–36.5)
MCV RBC AUTO: 91 FL (ref 78–100)
PLATELET # BLD AUTO: 191 10E3/UL (ref 150–450)
POTASSIUM BLD-SCNC: 4.3 MMOL/L (ref 3.4–5.3)
RBC # BLD AUTO: 4.43 10E6/UL (ref 4.4–5.9)
SODIUM SERPL-SCNC: 142 MMOL/L (ref 133–144)
WBC # BLD AUTO: 7.7 10E3/UL (ref 4–11)

## 2022-10-19 PROCEDURE — 94640 AIRWAY INHALATION TREATMENT: CPT

## 2022-10-19 PROCEDURE — 250N000013 HC RX MED GY IP 250 OP 250 PS 637: Performed by: HOSPITALIST

## 2022-10-19 PROCEDURE — 36415 COLL VENOUS BLD VENIPUNCTURE: CPT | Performed by: HOSPITALIST

## 2022-10-19 PROCEDURE — 120N000001 HC R&B MED SURG/OB

## 2022-10-19 PROCEDURE — 85027 COMPLETE CBC AUTOMATED: CPT | Performed by: HOSPITALIST

## 2022-10-19 PROCEDURE — 80048 BASIC METABOLIC PNL TOTAL CA: CPT | Performed by: HOSPITALIST

## 2022-10-19 PROCEDURE — 86140 C-REACTIVE PROTEIN: CPT | Performed by: HOSPITALIST

## 2022-10-19 PROCEDURE — 999N000033 HC STATISTIC CHRONIC PULMONARY DISEASE SPECIALIST

## 2022-10-19 PROCEDURE — 250N000009 HC RX 250: Performed by: HOSPITALIST

## 2022-10-19 PROCEDURE — 94640 AIRWAY INHALATION TREATMENT: CPT | Mod: 76

## 2022-10-19 PROCEDURE — 999N000157 HC STATISTIC RCP TIME EA 10 MIN

## 2022-10-19 PROCEDURE — 250N000013 HC RX MED GY IP 250 OP 250 PS 637: Performed by: INTERNAL MEDICINE

## 2022-10-19 PROCEDURE — 99233 SBSQ HOSP IP/OBS HIGH 50: CPT | Performed by: HOSPITALIST

## 2022-10-19 PROCEDURE — 250N000011 HC RX IP 250 OP 636: Performed by: HOSPITALIST

## 2022-10-19 PROCEDURE — 97530 THERAPEUTIC ACTIVITIES: CPT | Mod: GP

## 2022-10-19 PROCEDURE — 97116 GAIT TRAINING THERAPY: CPT | Mod: GP

## 2022-10-19 RX ADMIN — DOXYCYCLINE HYCLATE 100 MG: 100 CAPSULE ORAL at 08:52

## 2022-10-19 RX ADMIN — ACETAMINOPHEN 975 MG: 325 TABLET, FILM COATED ORAL at 08:53

## 2022-10-19 RX ADMIN — OXYCODONE HYDROCHLORIDE 10 MG: 5 TABLET ORAL at 03:15

## 2022-10-19 RX ADMIN — AMLODIPINE BESYLATE 5 MG: 5 TABLET ORAL at 08:52

## 2022-10-19 RX ADMIN — METHYLPREDNISOLONE SODIUM SUCCINATE 81.25 MG: 125 INJECTION, POWDER, FOR SOLUTION INTRAMUSCULAR; INTRAVENOUS at 08:54

## 2022-10-19 RX ADMIN — METOPROLOL SUCCINATE 50 MG: 50 TABLET, EXTENDED RELEASE ORAL at 08:52

## 2022-10-19 RX ADMIN — LEVOTHYROXINE SODIUM 137 MCG: 112 TABLET ORAL at 08:52

## 2022-10-19 RX ADMIN — DOXYCYCLINE HYCLATE 100 MG: 100 CAPSULE ORAL at 20:40

## 2022-10-19 RX ADMIN — ROSUVASTATIN CALCIUM 10 MG: 10 TABLET, FILM COATED ORAL at 20:40

## 2022-10-19 RX ADMIN — OXYCODONE HYDROCHLORIDE 10 MG: 5 TABLET ORAL at 22:44

## 2022-10-19 RX ADMIN — FUROSEMIDE 20 MG: 20 TABLET ORAL at 10:02

## 2022-10-19 RX ADMIN — OXYCODONE HYDROCHLORIDE 10 MG: 5 TABLET ORAL at 17:59

## 2022-10-19 RX ADMIN — ACETAMINOPHEN 975 MG: 325 TABLET, FILM COATED ORAL at 22:44

## 2022-10-19 RX ADMIN — AMLODIPINE BESYLATE 5 MG: 5 TABLET ORAL at 20:40

## 2022-10-19 RX ADMIN — POLYETHYLENE GLYCOL 3350 17 G: 17 POWDER, FOR SOLUTION ORAL at 08:53

## 2022-10-19 RX ADMIN — SENNOSIDES 2 TABLET: 8.6 TABLET, FILM COATED ORAL at 08:52

## 2022-10-19 RX ADMIN — LISINOPRIL 40 MG: 20 TABLET ORAL at 08:53

## 2022-10-19 RX ADMIN — IPRATROPIUM BROMIDE AND ALBUTEROL SULFATE 3 ML: .5; 3 SOLUTION RESPIRATORY (INHALATION) at 13:17

## 2022-10-19 RX ADMIN — CEFUROXIME AXETIL 500 MG: 500 TABLET ORAL at 08:52

## 2022-10-19 RX ADMIN — ASPIRIN 81 MG: 81 TABLET, COATED ORAL at 20:40

## 2022-10-19 RX ADMIN — ASPIRIN 81 MG: 81 TABLET, COATED ORAL at 08:53

## 2022-10-19 RX ADMIN — IPRATROPIUM BROMIDE AND ALBUTEROL SULFATE 3 ML: .5; 3 SOLUTION RESPIRATORY (INHALATION) at 19:41

## 2022-10-19 RX ADMIN — OXYCODONE HYDROCHLORIDE 5 MG: 5 TABLET ORAL at 13:24

## 2022-10-19 RX ADMIN — CEFUROXIME AXETIL 500 MG: 500 TABLET ORAL at 20:40

## 2022-10-19 RX ADMIN — ACETAMINOPHEN 975 MG: 325 TABLET, FILM COATED ORAL at 16:56

## 2022-10-19 RX ADMIN — IPRATROPIUM BROMIDE AND ALBUTEROL SULFATE 3 ML: .5; 3 SOLUTION RESPIRATORY (INHALATION) at 07:32

## 2022-10-19 RX ADMIN — OXYCODONE HYDROCHLORIDE 10 MG: 5 TABLET ORAL at 07:55

## 2022-10-19 ASSESSMENT — ACTIVITIES OF DAILY LIVING (ADL)
ADLS_ACUITY_SCORE: 20

## 2022-10-19 NOTE — PROVIDER NOTIFICATION
Paged hospitalist:  the COPD team stopped by and said pt needs MD orders for Pulmonary Rehab Referral, Nebulizer machine, and an O2 prescription - sorry if they paged you too (they are new team, not sure of their processes)    Per MD: All this will be ordered whenever patient is discharging. He is not being discharged today. I already spoke to her yesterday. The plan yesterday was that patient may discharge today but that is not happening today.

## 2022-10-19 NOTE — PROGRESS NOTES
"[facility]    Hospitalist Progress Note    Date of Admission:  10/15/2022    Assessment & Plan   77-year-old male with known COPD, RUL nodule s/p non-diagnostic VATS and empiric radiation, metastatic papillary thyroid cancer who presented with worsening shortness of breath and 2 falls at home.    Acute respiratory failure with hypoxia  Possible COPD exacerbation versus community-acquired pneumonia versus radiation pneumonitis versus progression of malignancy  Leukocytosis  *Has had \"2-3 months\" SOB worsening recently, some relief with albuterol. This evening woke with marked SOB, EMS found O2 sats 66%. Responded to CPAP. No fevers, cough, chest pain. In EDafebrile, O2 sats 95% off BiPAP at time of my visit. Lactic 3.6, WBC 16.4. BNP, troponin normal. VBG 7.34/48/32/26. influenza and COVID-19 negative. EKG ST, no ischemic changes.   *CT chest w/o PE, consolidative changes around R upper lobe nodule, also new subpleural consolidation in LLL., emphyysema, worsening mediastinal LAD  - telemetry  -Appreciate pulmonology consultation.  Per their recommendations, patient is on Solu-Medrol 80 Mg daily for possible radiation pneumonitis and/or COPD exacerbation.  Will likely need slow taper and discharged on prednisone 40 Mg once per day decreasing by 10 Mg every 2 weeks until 10 Mg [continue 10 Mg dose until seen by pulmonary].  -Continue antibiotics: Ceftin and doxycycline for pneumonia coverage  - duonebs q8 hours scheduled  - q2 hour albuterol nebs prn  -Trelegy discontinued per pulmonology in setting of pneumonia.  Restart at discharge.  -Follow up with Dr Dumont and Pulmonary with Primary pulmonary provider at [facility]  -Seen by COPD education on 10/18.  Recommended albuterol nebs at discharge.  O2 is being weaned down.  Currently on 1 L nasal cannula O2.  -Ordered overnight oximetry as there has been concern noted in his chart of him having overnight episodes of apnea/hypoxia. "  Anticipate that patient will need oxygen at discharge at least with physical activity and sleep.  Would also recommend sleep study as outpatient.  -PT has recommended home with outpatient PT though he would benefit more from home therapies as he still has significant pain in his left hip and anticipates trouble getting in and out of the car to travel for appointments.    Fall with L leg pain  Prior left LIZABETH  Chronic back pain  States has had balance issues with his L leg for awhile. Fell twice on day of presentation when leg gave out, now worsening pain. No head trauma or neck pain. XR hip w/o fracture.   On chart review, he does have history of chronic bilateral hip pain, worse on the left.  States that he recently followed up with [facility] and had an MRI.  Apparently was called and told that he may need an aspiration.  - prn analgesics.  Pain continues to be a significant issue.  We will change analgesics to scheduled high-dose Tylenol 3 times daily and as needed oxycodone 5 to 10 mg every 4 hours.  - XR knee and pelvis with left hip with no acute findings  - PT  -[facility] consulted here.  Recommend holding off on any aspirate at this time till current infection is treated.  Follow-up with primary orthopedic surgeon, Dr. Brito as outpatient.  -As pain in his left hip has been persistent and significant factor limiting his mobility and progress during this hospitalization, opted to proceed with CT hip for further work-up to rule out occult fracture/other process.  Patient notes that the pain in the left hip became much worse after his fall on day of presentation.     CAD with hx Cx stenting 1997  HTN  HLD  [amlodipine 5 mg daily, aspirin 81 mg daily, furosemide 20 mg daily, lisinopril 40 mg daily, metoprolol XL 50 mg day, rosuvastatin 10 mg daily]  -Continue all PTA meds      Lung nodule  *Has lung nodule, PET positive. Attempt at VATS with resection unsuccessful 2/2 scar tissue. Has now undergone XRT treatment to the area.    *CT chest on admit w/o PE, consolidative changes around R upper lobe nodule, also new subpleural consolidation in LLL., emphysema, progression of mediastinal LAD.   - pulmonary consult as above.  Concern for progression of malignancy in differential.     Metastatic papillary thyroid cancer  Hypothyroidism  S/p total thyroidectomy 8/2021 with 4/4 LN positive. Treated with radioactive iodine ablation 9/2021.   - resume levothyroxine 137 mcg daily      Cirrhosis  Noted on CT on admission. This wasn't mentioned on CTs previous that I can see.   -Normal LFTs  - outpatient GI evaluation.  Patient notes having prior abdominal surgery and lots of scarring.  Defer further work-up at this time.  Defer to PCP for further follow-up regarding this.     RA  Follows with rheumatology. Not currently on medications for this     BRENNEN  - intolerant of CPAP     COVID-19 negative     DVT Prophylaxis: Pneumatic Compression Devices  Code Status: DNR / DNI     Disposition: Expected discharge in 1-2 days     Clinically Significant Risk Factors Present on Admission                        Ashley Langley MD  Text Page (7am - 6pm, M-F)  [employer]  Securely message with the Vocera Web Console (learn more here)  Text page via [employer] Paging/Directory      Interval History   Stable overnight.  Patient tells me he does not feel significantly different than when he presented although he appears more comfortable.  He still complains of significant pain in his left hip.  He seems very anxious at the thought of having to discharge home soon.  I offered him reassurance several times regarding his reassuring labs, vitals, improving oxygen requirements.  I did tell him that we will obtain CT today, work on further oxygen weaning, continue to work with PT and anticipate discharge home tomorrow.    -Data reviewed today: I reviewed all new labs and imaging results over the last 24 hours. I personally reviewed CT scan with result as  noted above    Physical Exam   Temp: 97.5  F (36.4  C) Temp src: Oral BP: (!) 171/86 Pulse: 71   Resp: 20 SpO2: 97 % O2 Device: Nasal cannula Oxygen Delivery: 1 LPM  Vitals:    10/15/22 0246   Weight: 81 kg (178 lb 9.6 oz)     Vital Signs with Ranges  Temp:  [97.5  F (36.4  C)-97.7  F (36.5  C)] 97.5  F (36.4  C)  Pulse:  [66-71] 71  Resp:  [17-20] 20  BP: (148-171)/(74-86) 171/86  SpO2:  [94 %-98 %] 97 %  I/O last 3 completed shifts:  In: 280 [P.O.:280]  Out: -     Constitutional: Alert, appears comfortable, in no acute distress, appears weak and deconditioned, moans and groans every time he has to reposition in bed or sit up due to pain in the left hip  Respiratory: Non labored breathing, poor air movement bilaterally, appears mildly tachypneic  Cardiovascular: Heart sounds regular rate and rhythm, no murmurs, no leg edema  GI: Abdomen is soft, non distended, non tender. Normal BS  Skin/Integumen: no rashes, no pressure sores, some tenderness to palpation along left thigh muscles  Neuro: alert, converses appropriately, moving all extremities, fluent speech, no facial asymmetry  Psych: mood and affect appropriate      Medications       acetaminophen  975 mg Oral TID     amLODIPine  5 mg Oral BID     aspirin  81 mg Oral BID     cefuroxime  500 mg Oral Q12H [facility] (08/20)     doxycycline hyclate  100 mg Oral BID     furosemide  20 mg Oral Daily     ipratropium - albuterol 0.5 mg/2.5 mg/3 mL  3 mL Nebulization TID     levothyroxine  137 mcg Oral Daily     lisinopril  40 mg Oral Daily     methylPREDNISolone  81.25 mg Intravenous Daily     metoprolol succinate ER  50 mg Oral Daily     rosuvastatin  10 mg Oral QPM     sennosides  1-2 tablet Oral Daily     sodium chloride (PF)  3 mL Intracatheter Q8H       Data   Recent Labs   Lab 10/19/22  0651 10/15/22  1027 10/15/22  0300   WBC 7.7  --  16.4*   HGB 12.9*  --  15.4   MCV 91  --  90     --  203     --  139   POTASSIUM 4.3  --  4.2   CHLORIDE 110*  --  104   CO2  26  --  22   BUN 32*  --  18   CR 0.82  --  0.95   ANIONGAP 6  --  13   KARLIE 8.8  --  9.2   GLC 93  --  140*   ALBUMIN  --  2.7*  --    PROTTOTAL  --  7.0  --    BILITOTAL  --  0.5  --    ALKPHOS  --  92  --    ALT  --  28  --    AST  --  13  --        Imaging  No results found for this or any previous visit (from the past 24 hour(s)).

## 2022-10-20 ENCOUNTER — APPOINTMENT (OUTPATIENT)
Dept: PHYSICAL THERAPY | Facility: CLINIC | Age: 77
DRG: 190 | End: 2022-10-20
Payer: MEDICARE

## 2022-10-20 ENCOUNTER — APPOINTMENT (OUTPATIENT)
Dept: CT IMAGING | Facility: CLINIC | Age: 77
DRG: 190 | End: 2022-10-20
Attending: HOSPITALIST
Payer: MEDICARE

## 2022-10-20 PROCEDURE — 250N000013 HC RX MED GY IP 250 OP 250 PS 637: Performed by: INTERNAL MEDICINE

## 2022-10-20 PROCEDURE — 999N000157 HC STATISTIC RCP TIME EA 10 MIN

## 2022-10-20 PROCEDURE — 94640 AIRWAY INHALATION TREATMENT: CPT | Mod: 76

## 2022-10-20 PROCEDURE — 97530 THERAPEUTIC ACTIVITIES: CPT | Mod: GP

## 2022-10-20 PROCEDURE — 99233 SBSQ HOSP IP/OBS HIGH 50: CPT | Performed by: INTERNAL MEDICINE

## 2022-10-20 PROCEDURE — 250N000013 HC RX MED GY IP 250 OP 250 PS 637: Performed by: HOSPITALIST

## 2022-10-20 PROCEDURE — 250N000011 HC RX IP 250 OP 636: Performed by: INTERNAL MEDICINE

## 2022-10-20 PROCEDURE — 97110 THERAPEUTIC EXERCISES: CPT | Mod: GP

## 2022-10-20 PROCEDURE — 250N000011 HC RX IP 250 OP 636: Performed by: HOSPITALIST

## 2022-10-20 PROCEDURE — 94640 AIRWAY INHALATION TREATMENT: CPT

## 2022-10-20 PROCEDURE — 250N000009 HC RX 250: Performed by: HOSPITALIST

## 2022-10-20 PROCEDURE — 73701 CT LOWER EXTREMITY W/DYE: CPT | Mod: LT,MF

## 2022-10-20 PROCEDURE — 250N000009 HC RX 250: Performed by: INTERNAL MEDICINE

## 2022-10-20 PROCEDURE — G1010 CDSM STANSON: HCPCS

## 2022-10-20 PROCEDURE — 120N000001 HC R&B MED SURG/OB

## 2022-10-20 RX ORDER — ALBUTEROL SULFATE 1.25 MG/3ML
1.25 SOLUTION RESPIRATORY (INHALATION) EVERY 4 HOURS PRN
Qty: 90 ML | Refills: 0 | Status: SHIPPED | OUTPATIENT
Start: 2022-10-20 | End: 2022-12-16

## 2022-10-20 RX ORDER — IOPAMIDOL 755 MG/ML
90 INJECTION, SOLUTION INTRAVASCULAR ONCE
Status: COMPLETED | OUTPATIENT
Start: 2022-10-20 | End: 2022-10-20

## 2022-10-20 RX ORDER — ALBUTEROL SULFATE 1.25 MG/3ML
1.25 SOLUTION RESPIRATORY (INHALATION) EVERY 4 HOURS PRN
Qty: 90 ML | Refills: 0 | Status: SHIPPED | OUTPATIENT
Start: 2022-10-20 | End: 2022-10-20

## 2022-10-20 RX ORDER — ALBUTEROL SULFATE 0.83 MG/ML
2.5 SOLUTION RESPIRATORY (INHALATION)
Qty: 90 ML | Refills: 0 | Status: SHIPPED | OUTPATIENT
Start: 2022-10-20 | End: 2022-10-20

## 2022-10-20 RX ORDER — PREDNISONE 20 MG/1
60 TABLET ORAL DAILY
Status: DISCONTINUED | OUTPATIENT
Start: 2022-10-21 | End: 2022-10-21 | Stop reason: HOSPADM

## 2022-10-20 RX ADMIN — DOXYCYCLINE HYCLATE 100 MG: 100 CAPSULE ORAL at 09:10

## 2022-10-20 RX ADMIN — ACETAMINOPHEN 975 MG: 325 TABLET, FILM COATED ORAL at 15:59

## 2022-10-20 RX ADMIN — LISINOPRIL 40 MG: 20 TABLET ORAL at 09:10

## 2022-10-20 RX ADMIN — ACETAMINOPHEN 975 MG: 325 TABLET, FILM COATED ORAL at 09:10

## 2022-10-20 RX ADMIN — DOXYCYCLINE HYCLATE 100 MG: 100 CAPSULE ORAL at 20:08

## 2022-10-20 RX ADMIN — IPRATROPIUM BROMIDE AND ALBUTEROL SULFATE 3 ML: .5; 3 SOLUTION RESPIRATORY (INHALATION) at 19:40

## 2022-10-20 RX ADMIN — OXYCODONE HYDROCHLORIDE 10 MG: 5 TABLET ORAL at 06:52

## 2022-10-20 RX ADMIN — CEFUROXIME AXETIL 500 MG: 500 TABLET ORAL at 20:08

## 2022-10-20 RX ADMIN — SENNOSIDES 2 TABLET: 8.6 TABLET, FILM COATED ORAL at 09:10

## 2022-10-20 RX ADMIN — IPRATROPIUM BROMIDE AND ALBUTEROL SULFATE 3 ML: .5; 3 SOLUTION RESPIRATORY (INHALATION) at 13:15

## 2022-10-20 RX ADMIN — ASPIRIN 81 MG: 81 TABLET, COATED ORAL at 09:10

## 2022-10-20 RX ADMIN — SODIUM CHLORIDE 67 ML: 900 INJECTION INTRAVENOUS at 05:57

## 2022-10-20 RX ADMIN — OXYCODONE HYDROCHLORIDE 10 MG: 5 TABLET ORAL at 02:45

## 2022-10-20 RX ADMIN — SENNOSIDES AND DOCUSATE SODIUM 2 TABLET: 50; 8.6 TABLET ORAL at 16:06

## 2022-10-20 RX ADMIN — OXYCODONE HYDROCHLORIDE 10 MG: 5 TABLET ORAL at 23:07

## 2022-10-20 RX ADMIN — IOPAMIDOL 90 ML: 755 INJECTION, SOLUTION INTRAVENOUS at 05:56

## 2022-10-20 RX ADMIN — AMLODIPINE BESYLATE 5 MG: 5 TABLET ORAL at 20:08

## 2022-10-20 RX ADMIN — POLYETHYLENE GLYCOL 3350 17 G: 17 POWDER, FOR SOLUTION ORAL at 16:05

## 2022-10-20 RX ADMIN — FUROSEMIDE 20 MG: 20 TABLET ORAL at 09:09

## 2022-10-20 RX ADMIN — ASPIRIN 81 MG: 81 TABLET, COATED ORAL at 20:08

## 2022-10-20 RX ADMIN — ACETAMINOPHEN 975 MG: 325 TABLET, FILM COATED ORAL at 22:59

## 2022-10-20 RX ADMIN — OXYCODONE HYDROCHLORIDE 5 MG: 5 TABLET ORAL at 16:27

## 2022-10-20 RX ADMIN — IPRATROPIUM BROMIDE AND ALBUTEROL SULFATE 3 ML: .5; 3 SOLUTION RESPIRATORY (INHALATION) at 07:11

## 2022-10-20 RX ADMIN — ROSUVASTATIN CALCIUM 10 MG: 10 TABLET, FILM COATED ORAL at 20:08

## 2022-10-20 RX ADMIN — METHYLPREDNISOLONE SODIUM SUCCINATE 81.25 MG: 125 INJECTION, POWDER, FOR SOLUTION INTRAMUSCULAR; INTRAVENOUS at 09:11

## 2022-10-20 RX ADMIN — AMLODIPINE BESYLATE 5 MG: 5 TABLET ORAL at 09:10

## 2022-10-20 RX ADMIN — METOPROLOL SUCCINATE 50 MG: 50 TABLET, EXTENDED RELEASE ORAL at 09:10

## 2022-10-20 RX ADMIN — LEVOTHYROXINE SODIUM 137 MCG: 112 TABLET ORAL at 09:09

## 2022-10-20 RX ADMIN — CEFUROXIME AXETIL 500 MG: 500 TABLET ORAL at 09:10

## 2022-10-20 ASSESSMENT — ACTIVITIES OF DAILY LIVING (ADL)
ADLS_ACUITY_SCORE: 20

## 2022-10-20 NOTE — PROGRESS NOTES
"Chronic Pulmonary Disease Specialist  Progress Note     Updated provider and RN CC regarding discharge plan for patient when medically stable.     Recommendations:    Discharge with Nebulizer compressor with prescription for Albuterol solution Q4 PRN.      Home Oxygen Assessment Testing 24-48 hours prior to discharge to determine O2 needs at rest and with exertion/activity. If the patient requires oxygen at rest, the walk test must be performed using the new baseline O2 to determine if patient needs more oxygen with activity.        -In the event patient qualifies for oxygen, at rest or with activity, please use the following verbiage in oxygen order: \"Please provide portable oxygen concentrator AND please test for oxygen conserving device using ____LPM to maintain sats between ____)      Referral for outpatient Pulmonary Rehab       Pulmonary Recommendations:    Avoid ICS in the setting of possible PNA.     *Restart Trelegy at discharge    Broad-spec antibiotics with atypical coverage.     Sputum culture if able to produce a sample.    Follow up with Dr Dumont and Pulmonary with Primary pulmonary provider at [facility]      *Recommended to patient that when he sees his Pulmonologist, Dr. Handy, in December, to ask him about continued use of his Trelegy ICS/LABA/LAMA in the setting of his possible pneumonia. Patient preemptively reached out to Dr. Handy's pulmonary nurse today to ask about this and received a response.     Will continue to follow and provide support.     Carly Smith, RRT, CTTS  Chronic Pulmonary Disease Specialist  Office: [phone]  Pager: [phone]                  "

## 2022-10-20 NOTE — PROGRESS NOTES
Care Management Follow Up    Length of Stay (days): 5    Expected Discharge Date: 10/20/2022     Concerns to be Addressed:       Patient plan of care discussed at interdisciplinary rounds: Yes    Anticipated Discharge Disposition: Home, Outpatient Rehab (PT, OT, SLP, Cardiac or Pulmonary)     Anticipated Discharge Services: None  Anticipated Discharge DME: Oxygen, Other (see comment) (neb machine)    Patient/family educated on Medicare website which has current facility and service quality ratings:    Education Provided on the Discharge Plan:    Patient/Family in Agreement with the Plan: yes    Referrals Placed by CM/SW: Internal Clinic Care Coordination, Durable Medical Equipment (DME), Scheduled Follow-up appointments  Private pay costs discussed: Not applicable    Additional Information:  Received message from Pt's wife who has questions regarding his discharge.  CC went into patient's room and Pt's wife was on speaker phone.  Discussed that it is up to MD whether patient will discharge today.  Reviewed that when orders are placed for o2 (if needed ) and nebulizer they would be sent to [facility] who would process and deliver bedside.  Wife also had questions regarding Follow-up with pulmonology based on her conversation with Carly Smith.    CC reached out to Carly Smith (see consult from 10/18) who will reach out to MD and patient's wife to clarify.  Bedside RN updated and she will do walk test for o2.     Per notes Pt is discharge home with Nebulizer, O2, walker, and OP PT(Pulmonary Rehab).     CC to follow.         Marisol Denis RN

## 2022-10-20 NOTE — PROGRESS NOTES
Care Management Follow Up    Length of Stay (days): 5    Expected Discharge Date: 10/20/2022     Concerns to be Addressed:       Patient plan of care discussed at interdisciplinary rounds: Yes    Anticipated Discharge Disposition: Home, Home Care, DME     Anticipated Discharge Services: None  Anticipated Discharge DME: Oxygen, Other (see comment) (neb machine)    Patient/family educated on Medicare website which has current facility and service quality ratings:    Education Provided on the Discharge Plan:    Patient/Family in Agreement with the Plan: yes    Referrals Placed by CM/SW: Homecare  Private pay costs discussed: Not applicable    Additional Information:  MD notes that Pt/wife are now wanting HHC as they feel patient is not able to get out to OP PT.  Reviewed HHC vs OP PT.  Pt/wife feel he would benefit from some time of HHC RN/PT/OT/HHA as he would be homebound.    Pt/family was provided with the Medicare Compare list for Home Care.  Discussed associated Medicare star ratings to assist with choice for referrals/discharge planning Yes    Education was given to pt/family that star ratings are updated/maintained by Medicare and can be reviewed by visiting www.medicare.gov Yes    Referral sent to [facility] via email. Await response.    Wife to transport at discharge. Prefers discharge later in day on 10/21 due to neighbors that are helping. Wife notes one is working on building a ramp for easier access.   Bedside RN will assist w/ getting Home o2, nebulizer.    Follow up made w/ PCP clinic and Pulmonology on AVS:    Oct 26, 2022 11:30 AM   (Arrive by 11:15 AM)   ED/Hospital Follow Up with Vidya Silveira PA-C   [facility] ([facility] )    Dec 30, 2022  8:30 AM   (Arrive by 8:15 AM)   PFT VISIT with ETELVINA PFL BALA   [facility] Pulmonary Function [facility] [place] ([facility] ) [address]   [address]    [facility] [address]   [phone]      Dec 30, 2022  9:00 AM   (Arrive by 8:45 AM)   Return Visit with Khoa Handy MD   [facility] ([facility] )       CC to follow for discharge planning, [facility].         Marisol Denis RN

## 2022-10-20 NOTE — DISCHARGE INSTRUCTIONS
Your doctor has ordered home care to help you after your hospital stay.  They will contact you regarding your 1st visit.  The service will be provided by [facility].  If you have not received a call within 48hrs of discharge, please call them at [phone]

## 2022-10-20 NOTE — PROGRESS NOTES
"[facility]    Hospitalist Progress Note    Date of Admission:  10/15/2022    Assessment & Plan   77-year-old male with known COPD, RUL nodule s/p non-diagnostic VATS and empiric radiation, metastatic papillary thyroid cancer who presented with worsening shortness of breath and 2 falls at home.    Acute respiratory failure with hypoxia  Possible COPD exacerbation versus community-acquired pneumonia versus radiation pneumonitis versus progression of malignancy  Leukocytosis  *Has had \"2-3 months\" SOB worsening recently, some relief with albuterol. This evening woke with marked SOB, EMS found O2 sats 66%. Responded to CPAP. No fevers, cough, chest pain. In EDafebrile, O2 sats 95% off BiPAP at time of my visit. Lactic 3.6, WBC 16.4. BNP, troponin normal. VBG 7.34/48/32/26. influenza and COVID-19 negative. EKG ST, no ischemic changes.   *CT chest w/o PE, consolidative changes around R upper lobe nodule, also new subpleural consolidation in LLL., emphyysema, worsening mediastinal LAD  - telemetry  -Appreciate pulmonology consultation.  Per their recommendations, patient is on Solu-Medrol 80 Mg daily for possible radiation pneumonitis and/or COPD exacerbation.  Will likely need slow taper and discharged on prednisone 40 mg once per day decreasing by 10 Mg every 2 weeks until 10 Mg [continue 10 Mg dose until seen by pulmonary].  - switch iv Solumedrol to Prednisone 60 mg po daily  - Pulm recommended PJP prophylaxis while on high dose of streoids; discussed with pharmacy and Dr Palm recommened to hold off PJP ppx for now  - Continue antibiotics: Ceftin (day 4) and doxycycline (day 6) for pneumonia coverage  - duonebs q8 hours scheduled  - q2 hour albuterol nebs prn  - aggressive IS  - Acapella valve  -Trelegy discontinued per pulmonology in setting of pneumonia.  Restart at discharge.  - Follow up with Dr Dumont and Pulmonary with Primary pulmonary provider at [facility]  -Seen by " COPD education on 10/18.  Recommended albuterol nebs at discharge.    - neb machine ordered along with Alb neb solution  - supplemental O2 as needed  - sat 91% on RA at rest, drops to to 85% on RA with activity  - supplemental O2 with activity and overnight ordered (there has been concern noted in his chart of him having overnight episodes of apnea/hypoxia.)  -  Would also recommend sleep study as outpatient.  - PT has recommended home with outpatient PT though he would benefit more from home therapies as he still has significant pain in his left hip and anticipates trouble getting in and out of the car to travel for appointments.  - will arrange for home PT/OT/RN, followed by Pulm rehab.     Fall with L leg pain  Prior left LIZABETH  Chronic back pain  States has had balance issues with his L leg for awhile. Fell twice on day of presentation when leg gave out, now worsening pain. No head trauma or neck pain. XR hip w/o fracture.   On chart review, he does have history of chronic bilateral hip pain, worse on the left.  States that he recently followed up with [facility] and had an MRI.  Apparently was called and told that he may need an aspiration.  - prn analgesics  - Pain continues to be a significant issue; started on scheduled Tylenol 975 mg po TID  - PTA on Norco, states it did not help too much, switched to Oxycodone 5-10 mg po q4h prn (helps more).  - XR knee and pelvis with left hip with no acute findings  - CT hip 10/20- Soft tissue edema left proximal thigh. Left LIZABETH. No visible fracture  - [facility] consulted here.  Recommend holding off on any aspirate at this time till current infection is treated.  Follow-up with primary orthopedic surgeon, Dr. Brito as outpatient  - PT/OT  - will arrange for home PT/OT    CAD with hx Cx stenting 1997  HTN  HLD  [amlodipine 5 mg daily, aspirin 81 mg daily, furosemide 20 mg daily, lisinopril 40 mg daily, metoprolol XL 50 mg day, rosuvastatin 10 mg daily]  -Continue all PTA meds   - BP  elevated -170's today  - monitor, consider increasing Norvasc to 19 mg po daily  - follow up with PCP     Lung nodule  *Has lung nodule, PET positive. Attempt at VATS with resection unsuccessful 2/2 scar tissue. Has now undergone XRT treatment to the area.   *CT chest on admit w/o PE, consolidative changes around R upper lobe nodule, also new subpleural consolidation in LLL., emphysema, progression of mediastinal LAD.   - pulmonary consult as above.  Concern for progression of malignancy in differential.     Metastatic papillary thyroid cancer  Hypothyroidism  S/p total thyroidectomy 8/2021 with 4/4 LN positive. Treated with radioactive iodine ablation 9/2021.   - resume levothyroxine 137 mcg daily      Cirrhosis  Noted on CT on admission. This wasn't mentioned on CTs previous that I can see.   -Normal LFTs  - outpatient GI evaluation.  Patient notes having prior abdominal surgery and lots of scarring.  Defer further work-up at this time.  Defer to PCP for further follow-up regarding this.     RA  Follows with rheumatology. Not currently on medications for this     BRENNEN  - intolerant of CPAP     COVID-19 negative     DVT Prophylaxis: Pneumatic Compression Devices  Code Status: DNR / DNI     Disposition: Expected discharge home tomorrow.     Clinically Significant Risk Factors Present on Admission                      Time Spent on this Encounter   I spent 35 minutes on the unit/floor managing the care of Harrison CASANOVA William. Over 50% of my time was spent on the following:   - Counseling the patient and/or family regarding: diagnostic results, prognosis, risks and benefits of treatment options and recommended follow-up  - Coordination of care with the: care coordinator/, nurse and Pharmacy.         MD Jaida Dao MD  Text Page (7am - 6pm, M-F)  [facility]  Securely message with the Vocera Web Console (learn more here)  Text page via [employer]  Paging/Directory      Interval History    Patient seen and examined, chart reviewed;  Still looks dyspneic but he states that the breathing improved compared with admission  - no chest pain  - still c/o significant left hip pain; Oxycodone helps and asks if he can have Oxycodone prescribed at the time of the discharge; discussed with the patient and his wife regarding CT hip with no acute findings  - the wife had many questions regarding discharge planning. I told her about the plan for home O2 and HHC      -Data reviewed today: I reviewed all new labs and imaging results over the last 24 hours. I personally reviewed CT scan with result as noted above    Physical Exam   Temp: 98  F (36.7  C) Temp src: Oral BP: (!) 170/80 Pulse: 72   Resp: 18 SpO2: 94 % O2 Device: Nasal cannula with humidification Oxygen Delivery: 2 LPM  Vitals:    10/15/22 0246   Weight: 81 kg (178 lb 9.6 oz)     Vital Signs with Ranges  Temp:  [97.5  F (36.4  C)-98.2  F (36.8  C)] 98  F (36.7  C)  Pulse:  [68-76] 72  Resp:  [18] 18  BP: (152-189)/(80-95) 170/80  SpO2:  [88 %-95 %] 94 %  No intake/output data recorded.    Constitutional: Alert, mildly dyspneic, appears uncomfortable when he has to reposition in bed or sit up due to pain in the left hip  Respiratory: diminished air entry, poor air movement bilaterally, appears mildly tachypneic  Cardiovascular: Heart sounds regular rate and rhythm, no murmurs, no leg edema  GI: Abdomen is soft, non distended, non tender. Normal BS  Skin/Integumen: no rashes, no pressure sores, some tenderness to palpation along left thigh muscles  Neuro: alert, converses appropriately, moving all extremities, fluent speech, no facial asymmetry  Psych: mood and affect appropriate      Medications       acetaminophen  975 mg Oral TID     amLODIPine  5 mg Oral BID     aspirin  81 mg Oral BID     cefuroxime  500 mg Oral Q12H [facility] (08/20)     doxycycline hyclate  100 mg Oral BID     furosemide  20 mg Oral Daily      ipratropium - albuterol 0.5 mg/2.5 mg/3 mL  3 mL Nebulization TID     levothyroxine  137 mcg Oral Daily     lisinopril  40 mg Oral Daily     metoprolol succinate ER  50 mg Oral Daily     [START ON 10/21/2022] predniSONE  60 mg Oral Daily     rosuvastatin  10 mg Oral QPM     sennosides  1-2 tablet Oral Daily     sodium chloride (PF)  3 mL Intracatheter Q8H       Data   Recent Labs   Lab 10/19/22  0651 10/15/22  1027 10/15/22  0300   WBC 7.7  --  16.4*   HGB 12.9*  --  15.4   MCV 91  --  90     --  203     --  139   POTASSIUM 4.3  --  4.2   CHLORIDE 110*  --  104   CO2 26  --  22   BUN 32*  --  18   CR 0.82  --  0.95   ANIONGAP 6  --  13   KARLIE 8.8  --  9.2   GLC 93  --  140*   ALBUMIN  --  2.7*  --    PROTTOTAL  --  7.0  --    BILITOTAL  --  0.5  --    ALKPHOS  --  92  --    ALT  --  28  --    AST  --  13  --        Imaging  Recent Results (from the past 24 hour(s))   CT Hip Left w Contrast    Narrative    EXAM: CT HIP LEFT W CONTRAST  LOCATION: [facility]  DATE/TIME: 10/20/2022 6:09 AM    INDICATION: persistent left hip pain after fall few days ago, has prosthetic hip.  COMPARISON: 02/02/2022  TECHNIQUE: CT scan of the pelvis was performed with IV contrast. Multiplanar reformats were obtained. Dose reduction techniques were used.  CONTRAST: 90mL Isovue 370    FINDINGS:    PELVIC ORGANS: Diverticulosis. Vascular calcification.    MUSCULOSKELETAL: Left hip prosthesis. No visible acute fracture or dislocation. Soft tissue edema left proximal thigh.      Impression    IMPRESSION:  1.  Soft tissue edema left proximal thigh.  2.  Left LIZABETH. No visible fracture.

## 2022-10-20 NOTE — PROGRESS NOTES
Patient has been assessed for Home Oxygen needs. Oxygen readings:    *Pulse oximetry (SpO2) = 91% on room air at rest while awake.    *SpO2 improved to 91% on 0liters/minute at rest.    *SpO2 = 85% on room air during activity/with exercise.    *SpO2 improved to 92% on 4liters/minute during activity/with exercise.

## 2022-10-21 ENCOUNTER — TELEPHONE (OUTPATIENT)
Dept: FAMILY MEDICINE | Facility: CLINIC | Age: 77
End: 2022-10-21

## 2022-10-21 VITALS
HEART RATE: 74 BPM | SYSTOLIC BLOOD PRESSURE: 155 MMHG | HEIGHT: 69 IN | OXYGEN SATURATION: 95 % | WEIGHT: 178.6 LBS | RESPIRATION RATE: 18 BRPM | DIASTOLIC BLOOD PRESSURE: 88 MMHG | TEMPERATURE: 98.1 F | BODY MASS INDEX: 26.45 KG/M2

## 2022-10-21 PROCEDURE — 99239 HOSP IP/OBS DSCHRG MGMT >30: CPT | Performed by: INTERNAL MEDICINE

## 2022-10-21 PROCEDURE — 250N000012 HC RX MED GY IP 250 OP 636 PS 637: Performed by: INTERNAL MEDICINE

## 2022-10-21 PROCEDURE — 250N000013 HC RX MED GY IP 250 OP 250 PS 637: Performed by: HOSPITALIST

## 2022-10-21 PROCEDURE — 94762 N-INVAS EAR/PLS OXIMTRY CONT: CPT

## 2022-10-21 PROCEDURE — 250N000013 HC RX MED GY IP 250 OP 250 PS 637: Performed by: INTERNAL MEDICINE

## 2022-10-21 PROCEDURE — 250N000009 HC RX 250: Performed by: HOSPITALIST

## 2022-10-21 PROCEDURE — 94640 AIRWAY INHALATION TREATMENT: CPT

## 2022-10-21 RX ORDER — PREDNISONE 10 MG/1
TABLET ORAL
Qty: 150 TABLET | Refills: 0 | Status: ON HOLD | OUTPATIENT
Start: 2022-10-21 | End: 2023-01-14

## 2022-10-21 RX ORDER — ACETAMINOPHEN 325 MG/1
975 TABLET ORAL 3 TIMES DAILY
Qty: 60 TABLET | Refills: 1 | Status: SHIPPED | OUTPATIENT
Start: 2022-10-21 | End: 2023-04-21

## 2022-10-21 RX ORDER — CEFUROXIME AXETIL 500 MG/1
500 TABLET ORAL EVERY 12 HOURS
Qty: 4 TABLET | Refills: 0 | Status: SHIPPED | OUTPATIENT
Start: 2022-10-21 | End: 2022-10-26

## 2022-10-21 RX ORDER — OXYCODONE HYDROCHLORIDE 5 MG/1
5-10 TABLET ORAL EVERY 6 HOURS PRN
Qty: 20 TABLET | Refills: 0 | Status: SHIPPED | OUTPATIENT
Start: 2022-10-21 | End: 2022-11-07

## 2022-10-21 RX ADMIN — ASPIRIN 81 MG: 81 TABLET, COATED ORAL at 09:30

## 2022-10-21 RX ADMIN — ACETAMINOPHEN 975 MG: 325 TABLET, FILM COATED ORAL at 16:52

## 2022-10-21 RX ADMIN — OXYCODONE HYDROCHLORIDE 5 MG: 5 TABLET ORAL at 16:52

## 2022-10-21 RX ADMIN — DOXYCYCLINE HYCLATE 100 MG: 100 CAPSULE ORAL at 09:30

## 2022-10-21 RX ADMIN — CEFUROXIME AXETIL 500 MG: 500 TABLET ORAL at 09:30

## 2022-10-21 RX ADMIN — LISINOPRIL 40 MG: 20 TABLET ORAL at 09:29

## 2022-10-21 RX ADMIN — AMLODIPINE BESYLATE 5 MG: 5 TABLET ORAL at 09:29

## 2022-10-21 RX ADMIN — PREDNISONE 60 MG: 20 TABLET ORAL at 09:29

## 2022-10-21 RX ADMIN — METOPROLOL SUCCINATE 50 MG: 50 TABLET, EXTENDED RELEASE ORAL at 09:29

## 2022-10-21 RX ADMIN — OXYCODONE HYDROCHLORIDE 10 MG: 5 TABLET ORAL at 03:33

## 2022-10-21 RX ADMIN — OXYCODONE HYDROCHLORIDE 5 MG: 5 TABLET ORAL at 16:55

## 2022-10-21 RX ADMIN — IPRATROPIUM BROMIDE AND ALBUTEROL SULFATE 3 ML: .5; 3 SOLUTION RESPIRATORY (INHALATION) at 07:30

## 2022-10-21 RX ADMIN — FUROSEMIDE 20 MG: 20 TABLET ORAL at 09:30

## 2022-10-21 RX ADMIN — ACETAMINOPHEN 975 MG: 325 TABLET, FILM COATED ORAL at 09:30

## 2022-10-21 RX ADMIN — OXYCODONE HYDROCHLORIDE 5 MG: 5 TABLET ORAL at 09:30

## 2022-10-21 RX ADMIN — POLYETHYLENE GLYCOL 3350 17 G: 17 POWDER, FOR SOLUTION ORAL at 13:38

## 2022-10-21 RX ADMIN — SENNOSIDES 2 TABLET: 8.6 TABLET, FILM COATED ORAL at 09:30

## 2022-10-21 RX ADMIN — LEVOTHYROXINE SODIUM 137 MCG: 112 TABLET ORAL at 09:30

## 2022-10-21 ASSESSMENT — ACTIVITIES OF DAILY LIVING (ADL)
ADLS_ACUITY_SCORE: 20
ADLS_ACUITY_SCORE: 20
ADLS_ACUITY_SCORE: 22
ADLS_ACUITY_SCORE: 22
ADLS_ACUITY_SCORE: 20
ADLS_ACUITY_SCORE: 22
ADLS_ACUITY_SCORE: 20

## 2022-10-21 NOTE — PROGRESS NOTES
CLINICAL NUTRITION SERVICES - BRIEF NOTE    Reviewed nutrition risk factors due to LOS.     Pt is tolerating a Regular diet, eating well per nursing flow sheets and chart review. Pt ordering appropriate meals/snacks per health touch. No significant weight loss per chart review. No nutrition issues identified at this time.    RD will be available for a full Nutrition Assessment with a consult.    RD will continue to follow per nutrition protocol.    Courtney Cleaning RD, LD

## 2022-10-21 NOTE — PROGRESS NOTES
Shift Summary [id number]    Admitting Diagnosis: COPD exacerbation (H) [J44.1]  Hip pain, left [M25.552]  Acute respiratory failure with hypoxia (H) [J96.01]  Respiratory failure with hypoxia (H) [J96.91]  Right upper lobe consolidation (H) [J18.1]  Fall in home, initial encounter [W19.XXXA, Y92.009]  Leukocytosis, unspecified type [D72.829]  Left lower lobe consolidation (H) [J18.1]   Vitals stable.   Pain 6-7/10. Taking Oxycodone PRN.   A&Ox4  Voiding : independently in commode.   Mobility : independent with walker in room.   Tele :NA.   CMS : intact.   Lung Sounds diminished at base on room air during shift but uses 1 to 2 liters intermittently.    GI ; constipation. Received Miralax prn this afternoon.   Dressing NA.     Orders Placed This Encounter      Combination Diet Regular Diet Adult      Diet       Plan:     Discharge home at 5pm.

## 2022-10-21 NOTE — TELEPHONE ENCOUNTER
Diana from [facility] called requesting providers approval to start home care services 10/24/2022. Pt was discharged today but need providers approval since HC visit is scheduled outside of 48 hour window.      Ok to leave a message for Diana at [phone] Ext [phone].

## 2022-10-21 NOTE — DISCHARGE SUMMARY
"[facility]  Hospitalist Discharge Summary      Date of Admission:  10/15/2022  Date of Discharge:  10/21/2022  6:20 PM  Discharging Provider: Jaida Ayala MD  Discharge Service: Hospitalist Service    Discharge Diagnoses      Acute respiratory failure with hypoxia  Possible COPD exacerbation versus community-acquired pneumonia versus radiation pneumonitis versus progression of lung malignancy  Left hip pain  Falls  Lung nodule    Other chronic medical problems:  HTN  HLP  CAD  Metastatic papillary thyroid cancer  Hypothyroidism  RA  BRENNEN      Follow-ups Needed After Discharge   Follow-up Appointments     Follow-up and recommended labs and tests       Follow up with primary care provider, Yaneli Dozier, within 7 days for   hospital follow- up.  No follow up labs or test are needed.    Follow up with primary Pulmonologist as scheduled.  Follow up with primary Ortho surgeon of left hip pain persists.             Unresulted Labs Ordered in the Past 30 Days of this Admission     No orders found from 9/15/2022 to 10/16/2022.      None    Discharge Disposition   Discharged to home  Condition at discharge: Stable      Hospital Course   77-year-old male with known COPD, RUL nodule s/p non-diagnostic VATS and empiric radiation, metastatic papillary thyroid cancer who presented with worsening shortness of breath and 2 falls at home. For a detailed HPI- please refer to H&P done by Dr Marc Calvert on 10/15/2022.     Acute respiratory failure with hypoxia  Possible COPD exacerbation versus community-acquired pneumonia versus radiation pneumonitis versus progression of lung malignancy  Leukocytosis  *Has had \"2-3 months\" SOB worsening recently, some relief with albuterol. This evening woke with marked SOB, EMS found O2 sats 66%. Responded to CPAP. No fevers, cough, chest pain. In EDafebrile, O2 sats 95% off BiPAP at time of my visit. Lactic 3.6, WBC 16.4. BNP, troponin normal. VBG 7.34/48/32/26. " influenza and COVID-19 negative. EKG ST, no ischemic changes.   *CT chest w/o PE, consolidative changes around R upper lobe nodule, also new subpleural consolidation in LLL., emphyysema, worsening mediastinal LAD  - telemetry  - Appreciate pulmonology consultation.  Per their recommendations, patient was started on Solu-Medrol 80 Mg daily for possible radiation pneumonitis and/or COPD exacerbation.    - also started on Ceftin and Doxycycline for PNA coverage  - duonebs q8 hours scheduled  -Trelegy discontinued per pulmonology in setting of pneumonia; plan to restart at discharge.  - q2 hour albuterol nebs prn  - aggressive IS  - Acapella valve  - Seen by COPD education on 10/18.  Recommended albuterol nebs at discharge.    - neb machine ordered along with Alb neb solution  - Pulm recommended PJP prophylaxis while on high dose of streoids; discussed with pharmacy and Dr Palm recommened to hold off PJP ppx for now  - Follow up with Dr Dumont and with Primary pulmonary provider at [facility] as scheduled.   - as per Pulmonology will slow taper steroids and discharge on prednisone 40 mg once per day decreasing by 10 Mg every 2 weeks until 10 Mg [continue 10 Mg dose until seen by pulmonary].    - supplemental O2 as needed  - sat 91% on RA at rest, drops to to 85% on RA with activity  - supplemental O2 with activity and overnight ordered (there has been concern noted in his chart of him having overnight episodes of apnea/hypoxia.)  -  Would also recommend sleep study as outpatient.  - PT has recommended home with outpatient PT though he would benefit more from home therapies as he still has significant pain in his left hip and anticipates trouble getting in and out of the car to travel for appointments.  - will arrange for home PT/OT/RN, followed by Pulm rehab.   - 10/22- feeling better today, breathing seems better, walked with a walker in the room.      Fall with L leg pain  Prior left LIZABETH  Chronic back  pain  States has had balance issues with his L leg for awhile. Fell twice on day of presentation when leg gave out, now worsening pain. No head trauma or neck pain. XR hip w/o fracture.   On chart review, he does have history of chronic bilateral hip pain, worse on the left.  States that he recently followed up with [facility] and had an MRI.  Apparently was called and told that he may need an aspiration.  - Pain continues to be a significant issue; started on scheduled Tylenol 975 mg po TID  - PTA on Norco, states it did not help too much, switched to Oxycodone 5-10 mg po q4h prn (helps more).  - XR knee and pelvis with left hip with no acute findings  - CT hip 10/20- Soft tissue edema left proximal thigh. Left LIZABETH. No visible fracture  - [facility] consulted here.  Recommend holding off on any aspirate at this time till current infection is treated.  Follow-up with primary orthopedic surgeon, Dr. Brito as outpatient  - PT/OT  - home PT/OT     CAD with hx Cx stenting 1997  HTN  HLD  [amlodipine 5 mg daily, aspirin 81 mg daily, furosemide 20 mg daily, lisinopril 40 mg daily, metoprolol XL 50 mg day, rosuvastatin 10 mg daily]  - Continue all PTA meds   - follow up with PCP      Lung nodule  *Has lung nodule, PET positive. Attempt at VATS with resection unsuccessful 2/2 scar tissue. Has now undergone XRT treatment to the area.   *CT chest on admit w/o PE, consolidative changes around R upper lobe nodule, also new subpleural consolidation in LLL., emphysema, progression of mediastinal LAD.   - pulmonary consult as above.  Concern for progression of malignancy in differential.     Metastatic papillary thyroid cancer  Hypothyroidism  S/p total thyroidectomy 8/2021 with 4/4 LN positive. Treated with radioactive iodine ablation 9/2021.   - resume levothyroxine 137 mcg daily      Cirrhosis  Noted on CT on admission. This wasn't mentioned on CTs previous that I can see.   -Normal LFTs  - outpatient GI evaluation.  Patient notes having  prior abdominal surgery and lots of scarring.  Defer further work-up at this time.  Defer to PCP for further follow-up regarding this.     RA  Follows with rheumatology. Not currently on medications for this     RBENNEN  - intolerant of CPAP     COVID-19 negative       Consultations This Hospital Stay   PULMONARY IP CONSULT  PHYSICAL THERAPY ADULT IP CONSULT  RESPIRATORY CARE IP CONSULT  ORTHOPEDIC SURGERY IP CONSULT  IP RESPIRATORY CARE CHRONIC PULMONARY DISEASE SPECIALIST  CARE MANAGEMENT / SOCIAL WORK IP CONSULT    Code Status   No CPR- Do NOT Intubate    Time Spent on this Encounter   I, Jaida Ayala MD, personally saw the patient today and spent greater than 30 minutes discharging this patient.       Jaida Ayala MD  [place] [address] [facility]  [address]  [facility] [address]  Phone: [phone]  ______________________________________________________________________    Physical Exam   Vital Signs: Temp: 98.3  F (36.8  C) Temp src: Oral BP: (!) 175/91 Pulse: 67   Resp: 18 SpO2: 95 % O2 Device: None (Room air) Oxygen Delivery: 2 LPM  Weight: 178 lbs 9.6 oz     Constitutional: Alert, NAD  Respiratory: diminished air entry, poor air movement bilaterally, no wheezing, no crackles  Cardiovascular: Heart sounds regular rate and rhythm, no murmurs, no leg edema  GI: Abdomen is soft, non distended, non tender. Normal BS  Skin/Integumen: no rashes, no pressure sores, some tenderness to palpation along left thigh muscles  Neuro: alert, converses appropriately, moving all extremities, fluent speech, no facial asymmetry  Psych: mood and affect appropriate              Primary Care Physician   Yaneli Dozier    Discharge Orders      Medication Therapy Management Referral      Pulmonary Rehab Referral      Home Care Referral      Reason for your hospital stay    COPD exacerbation  Possible radiation Pneumonitis     Follow-up and recommended labs and tests     Follow up with primary  care provider, Yaneli Dozier, within 7 days for hospital follow- up.  No follow up labs or test are needed.    Follow up with primary Pulmonologist as scheduled.  Follow up with primary Ortho surgeon of left hip pain persists.     Activity    Your activity upon discharge: activity as tolerated     When to contact your care team    Call your primary doctor or return to ER if you have any of the following: temperature greater than 100.5 or less than 96,  increased shortness of breath, chest pain, dizziness, loss of consciousness, nausea, vpmitng, abdominal pain.     Miscellaneous DME Order    DME Documentation:   Describe the reason for need to support medical necessity: COPD    I, the undersigned, certify that the above prescribed supplies are medically necessary for this patient and is both reasonable and necessary in reference to accepted standards of medical and necessary in reference to accepted standards of medical practice in the treatment of this patient's condition and is not prescribed as a convenience.     Oxygen Adult/Peds    Oxygen Documentation:   I certify that this patient, Harrison Thomas has been under my care (or a nurse practitioner or physician's assistant working with me). This is the face-to-face encounter for oxygen medical necessity.      Harrison Thomas is now in a chronic stable state and continues to require supplemental oxygen. Patient has continued oxygen desaturation due to COPD J44.9.    Alternative treatment(s) tried or considered and deemed clinically infective for treatment of COPD J44.9 include nebulizers, inhalers, steroids, pulmonary toileting, and pulmonary rehab.  If portability is ordered, is the patient mobile within the home? yes    **Patients who qualify for home O2 coverage under the CMS guidelines require ABG tests or O2 sat readings obtained closest to, but no earlier than 2 days prior to the discharge, as evidence of the need for home oxygen therapy. Testing must be  performed while patient is in the chronic stable state. See notes for O2 sats.**     Diet    Follow this diet upon discharge: Orders Placed This Encounter      Combination Diet Regular Diet Adult       Significant Results and Procedures   Most Recent 3 CBC's:Recent Labs   Lab Test 10/19/22  0651 10/15/22  0300 10/08/22  1250   WBC 7.7 16.4* 6.4   HGB 12.9* 15.4 13.9   MCV 91 90 90    203 212     Most Recent 3 BMP's:Recent Labs   Lab Test 10/19/22  0651 10/15/22  0300 03/16/22  1016    139 143   POTASSIUM 4.3 4.2 4.7   CHLORIDE 110* 104 108   CO2 26 22 26   BUN 32* 18 15   CR 0.82 0.95 0.92   ANIONGAP 6 13 9   KARLIE 8.8 9.2 8.9  8.9   GLC 93 140* 99     Most Recent 2 LFT's:Recent Labs   Lab Test 10/15/22  1027 03/16/22  1016   AST 13 13   ALT 28 22   ALKPHOS 92 102   BILITOTAL 0.5 0.3     Most Recent 3 INR's:Recent Labs   Lab Test 02/11/22  0749 01/21/22  0629 07/02/21  0712   INR 1.03 1.18* 0.99     Most Recent 3 Creatinines:Recent Labs   Lab Test 10/19/22  0651 10/15/22  0300 03/16/22  1016   CR 0.82 0.95 0.92     Most Recent 3 Hemoglobins:Recent Labs   Lab Test 10/19/22  0651 10/15/22  0300 10/08/22  1250   HGB 12.9* 15.4 13.9     Most Recent 3 Troponin's:Recent Labs   Lab Test 02/24/19  2119   TROPI <0.015     Most Recent 3 BNP's:Recent Labs   Lab Test 10/15/22  0300   NTBNPI 386     Most Recent TSH and T4:Recent Labs   Lab Test 03/16/22  1016   TSH 2.44   T4 1.26     Most Recent Hemoglobin A1c:Recent Labs   Lab Test 11/01/21  1241   A1C 5.1     Most Recent 6 glucoses:Recent Labs   Lab Test 10/19/22  0651 10/15/22  0300 03/16/22  1016 01/21/22  0629 12/29/21  1551 11/01/21  1236   GLC 93 140* 99 102* 86 96     Most Recent ABG:Recent Labs   Lab Test 10/15/22  0619   PH 7.35     Most Recent Anemia Panel:Recent Labs   Lab Test 10/19/22  0651 06/06/16  0752 11/23/15  1045   WBC 7.7   < > 6.5   HGB 12.9*   < > 14.0   HCT 40.5   < > 42.1   MCV 91   < > 90      < > 166   B12  --   --  899    < > =  values in this interval not displayed.   ,   Results for orders placed or performed during the hospital encounter of 10/15/22   CT Chest Pulmonary Embolism w Contrast    Narrative    EXAM: CT CHEST PULMONARY EMBOLISM WITH CONTRAST  LOCATION: [facility]  DATE/TIME: 10/15/2022, 4:19 AM    INDICATION: Dyspnea, history of lung cancer, rule out PE, PNA, etc.  COMPARISON: 08/31/2022.  TECHNIQUE: CT chest pulmonary angiogram during arterial phase injection of IV contrast. Multiplanar reformats and MIP reconstructions were performed. Dose reduction techniques were used.   CONTRAST: 67 mL Isovue 370.    FINDINGS:  ANGIOGRAM CHEST: Pulmonary arteries are normal caliber and negative for pulmonary emboli. Thoracic aorta is negative for dissection. No CT evidence of right heart strain.    LUNGS AND PLEURA: Previously described 1.5 x 1.2 cm right upper lobe nodule not seen due to surrounding consolidation which has progressed. In addition, there is new subpleural consolidation within the left lower lobe with new interstitial infiltrates in   both lungs. Underlying emphysematous change. Vascular wall thickening with some mucous plugging in the lower lobes.    MEDIASTINUM/AXILLAE: There is mediastinal lymphadenopathy which has progressed. Representative node in the subcarinal region measures 1.6 cm in short axis dimension in comparison to 0.9 cm.    CORONARY ARTERY CALCIFICATION: Severe.    UPPER ABDOMEN: Cirrhosis. Surgical changes in the right hepatic lobe. Gallstones.    MUSCULOSKELETAL: Demineralization with stable chronic midthoracic compression fracture.      Impression    IMPRESSION:  1.  No pulmonary embolism.    2.  Emphysematous change.    3.  Previously described 1.5 cm nodule in the right upper lobe is not seen due to surrounding consolidative changes which have progressed. In addition, there is new subpleural consolidation within the left lower lobe with tiny pleural effusion. Increased    interstitial changes throughout the lungs with underlying emphysematous change.    4.  Progression of mediastinal lymphadenopathy.    5.  Cirrhosis. Postsurgical changes in the right hepatic lobe.    6.  Cholelithiasis.     XR Pelvis w Hip Left 1 View    Narrative    EXAM: XR PELVIS AND HIP LEFT 1 VIEW  LOCATION: [facility]  DATE/TIME: 10/15/2022 4:18 AM    INDICATION: trauma, hx hip surgery  COMPARISON: 04/06/2022      Impression    IMPRESSION: Prior left hip replacement. Prosthetic components appear intact and normally aligned. No fracture. Severe arterial calcification.   XR Knee Left 1/2 Views    Narrative    EXAM: XR KNEE LEFT 1/2 VIEWS  LOCATION: [facility]  DATE/TIME: 10/15/2022 8:18 AM    INDICATION: fall with knee  lower L femur pain  COMPARISON: None.      Impression    IMPRESSION: The left knee is negative.   CT Hip Left w Contrast    Narrative    EXAM: CT HIP LEFT W CONTRAST  LOCATION: [facility]  DATE/TIME: 10/20/2022 6:09 AM    INDICATION: persistent left hip pain after fall few days ago, has prosthetic hip.  COMPARISON: 02/02/2022  TECHNIQUE: CT scan of the pelvis was performed with IV contrast. Multiplanar reformats were obtained. Dose reduction techniques were used.  CONTRAST: 90mL Isovue 370    FINDINGS:    PELVIC ORGANS: Diverticulosis. Vascular calcification.    MUSCULOSKELETAL: Left hip prosthesis. No visible acute fracture or dislocation. Soft tissue edema left proximal thigh.      Impression    IMPRESSION:  1.  Soft tissue edema left proximal thigh.  2.  Left LIZABETH. No visible fracture.     *Note: Due to a large number of results and/or encounters for the requested time period, some results have not been displayed. A complete set of results can be found in Results Review.       Discharge Medications   Current Discharge Medication List      START taking these medications    Details   albuterol (ACCUNEB) 1.25 MG/3ML neb  solution Take 1 vial (1.25 mg) by nebulization every 4 hours as needed for shortness of breath / dyspnea or wheezing  Qty: 90 mL, Refills: 0    Associated Diagnoses: COPD exacerbation (H)      cefuroxime (CEFTIN) 500 MG tablet Take 1 tablet (500 mg) by mouth every 12 hours  Qty: 4 tablet, Refills: 0    Associated Diagnoses: COPD exacerbation (H)      oxyCODONE (ROXICODONE) 5 MG tablet Take 1-2 tablets (5-10 mg) by mouth every 6 hours as needed for moderate to severe pain  Qty: 20 tablet, Refills: 0    Associated Diagnoses: Arthralgia of left lower leg      predniSONE (DELTASONE) 10 MG tablet Take 40 mg daily for 2 weeks, then 30 mg daily for 2 weeks, then 20 mg daily for 2 weeks, then 10 mg daily. Continue to take Prednisone 10 mg daily until you will see the Pulmonologist.  Qty: 150 tablet, Refills: 0    Associated Diagnoses: COPD exacerbation (H)         CONTINUE these medications which have CHANGED    Details   acetaminophen (TYLENOL) 325 MG tablet Take 3 tablets (975 mg) by mouth 3 times daily  Qty: 60 tablet, Refills: 1    Associated Diagnoses: Metastasis from thyroid cancer (H)         CONTINUE these medications which have NOT CHANGED    Details   albuterol (PROAIR HFA/PROVENTIL HFA/VENTOLIN HFA) 108 (90 Base) MCG/ACT inhaler INHALE 2 PUFFS BY MOUTH EVERY 6 HOURS AS NEEDED FOR WHEEZE OR FOR SHORTNESS OF BREATH  Qty: 18 g, Refills: 3    Associated Diagnoses: Chronic obstructive pulmonary disease with (acute) exacerbation (H)      amLODIPine (NORVASC) 5 MG tablet Take 1 tablet (5 mg) by mouth 2 times daily  Qty: 180 tablet, Refills: 3    Associated Diagnoses: Benign essential hypertension      Ascorbic Acid (VITAMIN C PO) Take 1 tablet by mouth daily      aspirin 81 MG tablet Take 1 tablet by mouth 2 times daily       calcium carbonate (OS-KARLIE) 1500 (600 Ca) MG tablet Take 600 mg by mouth 2 times daily      Carisoprodol 250 MG TABS Take 250 mg by mouth daily as needed (spasms)  Qty: 31 tablet, Refills: 3     Comments: Not to exceed 2 additional fills before 01/25/2022 DX Code Needed  PLEASE SEND AS PT IS OUT.  Associated Diagnoses: Back muscle spasm      cholecalciferol 25 MCG (1000 UT) TABS Take 1,000 Units by mouth daily       FISH OIL 1000 MG OR CAPS Take 1 g by mouth daily       Fluticasone-Umeclidin-Vilanterol (TRELEGY ELLIPTA) 100-62.5-25 MCG/INH oral inhaler Inhale 1 puff into the lungs daily  Qty: 90 each, Refills: 3    Associated Diagnoses: Obstructive chronic bronchitis with exacerbation (H)      furosemide (LASIX) 20 MG tablet Take 1 tablet (20 mg) by mouth daily  Qty: 90 tablet, Refills: 3    Associated Diagnoses: Essential hypertension, benign      guaiFENesin (MUCINEX) 600 MG 12 hr tablet Take 600 mg by mouth 2 times daily as needed for congestion      levothyroxine (SYNTHROID/LEVOTHROID) 137 MCG tablet TAKE 1 TABLET (137 MCG) BY MOUTH DAILY , STARTING ON 9/23/21  Qty: 90 tablet, Refills: 1    Comments: DX Code Needed  OUT REFILLS.  Associated Diagnoses: Postsurgical hypothyroidism      Lidocaine (LIDOCARE) 4 % Patch Place 1 patch onto the skin daily as needed for moderate pain To prevent lidocaine toxicity, patient should be patch free for 12 hrs daily. For low back pain  Qty: 30 patch, Refills: 5    Associated Diagnoses: Back muscle spasm      lisinopril (ZESTRIL) 40 MG tablet TAKE 1 TABLET BY MOUTH EVERY DAY  Qty: 90 tablet, Refills: 0    Associated Diagnoses: Benign essential hypertension      metoprolol succinate ER (TOPROL XL) 50 MG 24 hr tablet TAKE 1 TABLET (50 MG) BY MOUTH DAILY (TAKE WITH 25MG TABLET FOR TOTAL 75MG DAILY)  Qty: 90 tablet, Refills: 2    Associated Diagnoses: Essential (primary) hypertension      multivitamin w/minerals (THERA-VIT-M) tablet Take 1 tablet by mouth daily       naloxone (NARCAN) 4 MG/0.1ML nasal spray Spray 1 spray (4 mg) into one nostril alternating nostrils once as needed for opioid reversal every 2-3 minutes until assistance arrives  Qty: 0.2 mL, Refills: 3     Associated Diagnoses: Spinal stenosis of lumbar region with neurogenic claudication; Metastasis from thyroid cancer (H); Rheumatoid arthritis involving multiple sites with positive rheumatoid factor (H)      nitroGLYcerin (NITROSTAT) 0.4 MG sublingual tablet FOR CHEST PAIN PLACE 1 TAB UNDER TONGUE EVERY 5 MIN FOR 3 DOSES. IF SYMPTOMS PERSIST 5 MIN AFTER 1ST DOSE CALL 911  Qty: 25 tablet, Refills: 3    Comments: DX Code Needed  PLEASE SEND PT STATES ONES HE HAS ARE CLOSE TO .  Associated Diagnoses: Coronary artery disease involving native coronary artery of native heart without angina pectoris      rosuvastatin (CRESTOR) 10 MG tablet TAKE 1 TABLET BY MOUTH EVERY DAY  Qty: 90 tablet, Refills: 3    Comments: DX Code Needed  .  Associated Diagnoses: Hyperlipidemia, unspecified      senna-docusate (SENOKOT-S/PERICOLACE) 8.6-50 MG tablet Take 1 tablet by mouth 2 times daily as needed for constipation      sildenafil (VIAGRA) 100 MG tablet Take 100 mg by mouth daily as needed   Qty: 20 tablet, Refills: 6    Associated Diagnoses: Benign prostatic hyperplasia with incomplete bladder emptying      DULoxetine (CYMBALTA) 20 MG capsule Take 1 capsule (20 mg) by mouth daily  Qty: 180 capsule, Refills: 3    Associated Diagnoses: Situational depression         STOP taking these medications       HYDROcodone-acetaminophen (NORCO) 5-325 MG tablet Comments:   Reason for Stopping:             Allergies   Allergies   Allergen Reactions     Levaquin Difficulty breathing     Plavix [Clopidogrel Bisulfate] Itching     Atorvastatin Calcium Cramps     lipitor     Cats      Dogs      Hctz [Hydrochlorothiazide]      Rash on legs     Sulfasalazine Other (See Comments)     Stomach cramps

## 2022-10-21 NOTE — PROVIDER NOTIFICATION
Dr Ayala notified both via [employer] messaging and in person during MD rounding for    BP remains high in 170s/80s after given scheduled medications (Norvasc, lisinopril, metoprolol and also lasix)      Response:  No new orders.

## 2022-10-21 NOTE — PLAN OF CARE
Shift note:  A/O x 4, pain level was 6 at 2300, 10 mg of Oxycodone was given, pain level was 3 after 15 mines, the heart sound is irregular, radial pulse is irregular, crackles on the LLL and reduced breath sound RLL, the skin on the sacrum/coccyx is intact, there is no pedal edema, bowel sound is active. Ps was placed on Nocturnal Oximetry activity overnight. Regular diet, changed position independently.                      
Discharge Note    Patient discharged to home via private vehicle  accompanied by significant other .  IV: Discontinued  Prescriptions printed and given to patient/family.   Belongings reviewed and sent with patient.   Home medications returned to patient: Yes  Equipment sent with: patient.   patient verbalizes understanding of discharge instructions. AVS given to patient.                              
Goal Outcome Evaluation:         1500 - 0730  A&Ox4, VSS on 2L O2 via NC. C/o L hip and ribcage pain managed with Q4h norco. ACEVES, LS diminished - recovers well when back in bed. Scheduled IV solumedrol and nebs, used IS throughout shift. Reports mild constipation, requested bowel meds in AM - BS active. Up independently in room with walker from home - pt educated on calling for help if feeling weak. Discharge pending clinical progress.               
Goal Outcome Evaluation:         2300 - 0730  A&Ox4, pleasant mood. VSS on 1L O2 via NC. ACEVES, recovers well back at rest. L hip pain managed with scheduled tylenol and PRN oxycodone, pt requested 10mg overnight. Up SBA with walker in room. Tolerating regular diet. Firm, mildly distended abdomen. Last BM on 10/15, declined senna in evening but would like bowel meds in AM. PIV SL'd. Possible discharge home today or tomorrow on home oxygen with outpatient PT and pulmonology follow up.                 
Goal Outcome Evaluation:         A/Ox4. VSS on 5 L NC. Tele- sinus tachy. L thigh and knee pain- Ortho consulted. A1 GB walker. Continent. Pulmonologist following. Reg diet. PRN chrissy was taking one tablet during the day and is requesting two tablets before bed. DIC pending.               
Goal Outcome Evaluation:       Patient remains stable with vitals in the normal range. On 2 LPM of oxygen. Saturates about 95%. Complains of left hip and knee pain. One pill of norco give. Reports relief. Pain is exacerbated by activity. Patient wants to be independent in the room. He appeared steady with a walker. His wife brought his walker. No bed alarm. On solumedrol for COPD. Oral antibiotics. Good appetite. Voiding without difficulty. No bowel movement. Senna give.                  
Goal Outcome Evaluation:       Patient remains stable with vitals with in base line. On 2 LPM of oxygen per Naso canula. Saturating at 96%. Tapered down oxygen later in the afternoon to  1LPM. Saturates 94%. Tolerated activity outside of the room with the help of a walker and stand by assist. Alert and oriented. Left side chest, knee and hip pain. Patent attributes his pain to the fall he had at home. Pain is managed with 1 tablet of norco. Constipated. Senna given. Will reassess.   Good appetite. Voids without difficulty. Continues on solumedrol and oral antibiotics to help his breathing. Assessed by respiratory  therapy specialist.                  
Goal Outcome Evaluation:       Pt A&Ox4. VSS except Hypertensive on RA. Reg diet. On continuous pulse oximetry, saturations in mid 90s all shift. Dyspnea on exertion, LS diminished. C/o of L hip/knee pain, controlled w/ scheduled tylenol and PRN Oxy. C/o of constipation, PRN miralax and senna given. Up SBA w/ walker. Refuses bed alarm, education given. Voiding adequately. L PIV SL. Home O2 test completed. Discharge to home planned tomorrow.                 
Goal Outcome Evaluation:    A&Ox4. VSS on 2L via NC. C/o L hip pain, controlled with PRN norco, declines ice packs. LS diminished, ACEVES, no cough noted. Continues on IV solumedrol and scheduled nebs, using IS at bedside. HR tachy at times. BS active, reports BM yesterday. Pulmonology following. PT eval today and ambulated in hallways x3. Discharge pending progress.     
Goal Outcome Evaluation:    A/Ox4, VSS on 1L NC. ACEVES. Pain managed w/ prn oxy given 2x and scheduled tyelnol for hip pain. Denies nausea. Up SBA in room. Regular diet. PRN senna given earlier for constipation, no bm this shift. PIV SL. Discharge pending                    
Goal Outcome Evaluation:    A/Ox4, VSS on 1L NC. Up SBA w/ walker, can be independent in room. Pain managed w/ prn oxy and scheduled tylenol, pt has chronic hip pain. Regular diet. Lost PIV this evening, will replace. Plan to discharge home with cares in next few days                    
Goal Outcome Evaluation:    Shift Summary [id number]    Admitting Diagnosis: COPD exacerbation (H) [J44.1]  Hip pain, left [M25.552]  Acute respiratory failure with hypoxia (H) [J96.01]  Respiratory failure with hypoxia (H) [J96.91]  Right upper lobe consolidation (H) [J18.1]  Fall in home, initial encounter [W19.XXXA, Y92.009]  Leukocytosis, unspecified type [D72.829]  Left lower lobe consolidation (H) [J18.1]   Vitals : WNL.   Pain 5-7/10. Taking oXYCODONE PRN.   A&Ox4  Voiding ; wdl.   Mobility AX1 walker GB.   Tele NA.   CMS ; INTACT.   Lung Sounds :diminshed at basaes, clear in upper lobes.  on O2  via nasal cannula 1 liter.   GI : Constipation. Received PRN dose of Miralax.   Dressing : NA. PIV site intact.     Orders Placed This Encounter      Combination Diet Regular Diet Adult       Plan:     Provider has ordred a CT of left hip to rule out fracture d/t increasing pain on left hip.  Discharge home with Home Care pending.                             
Goal Outcome Evaluation: 1930-0700    A&O x4. Up 1 assist GB/RW. VSS on 4L NC. Tele: sinus with occasional PAC's. Pain managed with norco. R and L FER SL.  Voiding spontaneously.                   
Goal Outcome Evaluation: 1930-0700    A&O x4. VSS on 2L NC. Cooperative and pleasant.  Left hip/knee pain controlled with 1-2 tabs Norco, offered heat and ice-pt declined. Using IS, tolerates well, able to reach 2000. Up 1 assist GB/RW.  Voiding spontaneously.  Regular diet.                 
Goal Outcome Evaluation: [id number]    A&O x4. VSS on RA, ex hypertension. On continuous pulse oximetry all shift, saturations 90% or above all shift on RA. New PIV placed at start of shift. 10 mg oxycodone given x2 for left hip pain. Up SBA w/ walker. Had CT of left hip this am. Refuses bed alarm, education given. Refuses SCD's.                       
Physical Therapy Discharge Summary    Reason for therapy discharge:    Discharged to home with home therapy.    Progress towards therapy goal(s). See goals on Care Plan in [facility] electronic health record for goal details.  Goals partially met.  Barriers to achieving goals:   discharge from facility.    Therapy recommendation(s):    Continued therapy is recommended.  Rationale/Recommendations:  Recommend continued skilled physical therapy services to continue improving independence with functional mobility including bed mobility, transfers, and ambulation..      
No

## 2022-10-21 NOTE — PROGRESS NOTES
"Care Management Discharge Note    Discharge Date: 10/21/2022       Discharge Disposition: Home    Discharge Services: Home Care-Referral sent to [facility] (PT, OT, RN)    Discharge DME: Oxygen (will be arranged by bedside RN), Nebulizer Compressor Machine and Tubing Kit (RN Care Coordinator supplied), Walker (given by PT)    Discharge Transportation: spouse    Private pay costs discussed: Not applicable    PAS Confirmation Code:  n/a  Patient/family educated on Medicare website which has current facility and service quality ratings:  yes    Education Provided on the Discharge Plan:  yes  Persons Notified of Discharge Plans: Patient, bedside RN, [facility]   Patient/Family in Agreement with the Plan: yes    Handoff Referral Completed: Yes, Outpatient Care Coordination Referral    Additional Information:  Writer informed that patient is medically cleared to discharge home today. Home Care has been ordered. Referral had been accepted by [facility] for PT, OT, RN. Oxygen will be arranged by bedside RN, Nebulizer Compressor Machine and 2 sets of Tubing were given by RN Care Coordinator ([facility] Supplied).  Writer informed patient regarding the nebulizer set up. He had no questions. No further discharge needs.       Addendum: Patient will be needing Home Oxygen as ordered. Assessment for Home Oxygen needs was done by RN yesterday. Readings verified need for Home Oxygen. Writer asked patient prior to writer leaving today if his oxygen had been delivered from [facility]. Patient responded \"no\". Writer asked patient's bedside RN and response from her was that \"we don't have anything to do with home oxygen\".  Charge Nurse also responded that the \"RNs have nothing to do with the home oxygen ordering\". Writer called [facility] and spoke with Nadine on call. She said that the delivery will be 2-3 hours this evening. Patient's spouse is in room ready to transport him home. " Patient was sitting in his chair very short of breath and not wearing his oxygen. He was informed that he should put it on immediately. Spouse wanted to take him home and have the oxygen delivered to the home so patient could discharge now. Informed spouse and patient that this would not be a safe discharge knowing patient is currently short of breath and would need oxygen now and for his transport home. Leaving without oxygen and not knowing exact time of oxygen delivery to the home this evening would be very unsafe. Bedside RN Katie will be receiving a call this evening to coordinate the oxygen delivery. Nadine from [facility] has the contact number for Katie. Per Policy the RN's will continue to follow the oxygen delivery protocol as confirmed by Case Management Manager.    Spouse willing to wait for oxygen to be delivered to patient's room this evening for discharge home.      Ruthy Alejo RN  Care Coordinator  [phone]

## 2022-10-23 DIAGNOSIS — E89.0 POSTSURGICAL HYPOTHYROIDISM: ICD-10-CM

## 2022-10-24 ENCOUNTER — PATIENT OUTREACH (OUTPATIENT)
Dept: CARE COORDINATION | Facility: CLINIC | Age: 77
End: 2022-10-24

## 2022-10-24 ENCOUNTER — TELEPHONE (OUTPATIENT)
Dept: FAMILY MEDICINE | Facility: CLINIC | Age: 77
End: 2022-10-24

## 2022-10-24 ENCOUNTER — NURSE TRIAGE (OUTPATIENT)
Dept: NURSING | Facility: CLINIC | Age: 77
End: 2022-10-24

## 2022-10-24 ENCOUNTER — MEDICAL CORRESPONDENCE (OUTPATIENT)
Dept: HEALTH INFORMATION MANAGEMENT | Facility: CLINIC | Age: 77
End: 2022-10-24

## 2022-10-24 RX ORDER — LEVOTHYROXINE SODIUM 137 UG/1
137 TABLET ORAL DAILY
Qty: 90 TABLET | Refills: 1 | Status: SHIPPED | OUTPATIENT
Start: 2022-10-24 | End: 2022-12-22

## 2022-10-24 NOTE — TELEPHONE ENCOUNTER
MTM referral from: Transitions of Care (recent hospital discharge or ED visit)    MTM referral outreach attempt on October 24, 2022 at 2:10 PM      Outcome: Patient is not interested at this time because he understands his medications, will route to MTM Pharmacist/Provider as an FYI. Thank you for the referral.     Deyanira Hidalgo [facility]

## 2022-10-24 NOTE — TELEPHONE ENCOUNTER
"Last Written Prescription Date:  4/7/22  Last Fill Quantity: 90,  # refills: 1   Last office visit: 9/7/2022 with prescribing provider: Dr. Simmons  Future Office Visit: 3/8/22    Next 5 appointments (look out 90 days)    Nov 22, 2022  1:30 PM  (Arrive by 1:10 PM)  Provider Visit with Yaneli Dozier MD  [facility] ([facility] ) [address] [place] [address], [address]  [facility] [address] [phone]               Requested Prescriptions   Pending Prescriptions Disp Refills     levothyroxine (SYNTHROID/LEVOTHROID) 137 MCG tablet [Pharmacy Med Name: LEVOTHYROXINE 137 MCG TABLET] 90 tablet 1     Sig: TAKE 1 TABLET (137 MCG) BY MOUTH DAILY , STARTING ON 9/23/21       Thyroid Protocol Passed - 10/23/2022  8:38 AM        Passed - Patient is 12 years or older        Passed - Recent (12 mo) or future (30 days) visit within the authorizing provider's specialty     Patient has had an office visit with the authorizing provider or a provider within the authorizing providers department within the previous 12 mos or has a future within next 30 days. See \"Patient Info\" tab in inbasket, or \"Choose Columns\" in Meds & Orders section of the refill encounter.              Passed - Medication is active on med list        Passed - Normal TSH on file in past 12 months     Recent Labs   Lab Test 03/16/22  1016   TSH 2.44                 Refill sent per protocol  Danielle Gaitan RN    "

## 2022-10-24 NOTE — TELEPHONE ENCOUNTER
Roby RN with [facility] calling for new verbal home care orders following a referral after hospitalization.  Will try to connect with his PCP clinic as it it still clinic hours.  Warm transferred caller to PCP clinic.    Brenda Adame RN  [facility]

## 2022-10-24 NOTE — PROGRESS NOTES
"Clinic Care Coordination Contact  [facility]: Post-Discharge Note  SITUATION                                                      Admission:    Admission Date: 10/15/22   Reason for Admission: Acute respiratory failure with hypoxia  Possible COPD exacerbation versus community-acquired pneumonia versus radiation pneumonitis versus progression of malignancy  Left hi pain  Falls  Lung nodule  Discharge:   Discharge Date: 10/21/22  Discharge Diagnosis: Acute respiratory failure with hypoxia  Possible COPD exacerbation versus community-acquired pneumonia versus radiation pneumonitis versus progression of malignancy  Left hi pain  Falls  Lung nodule    BACKGROUND                                                      Per hospital discharge summary and inpatient provider notes:    Hospital Course     77-year-old male with known COPD, RUL nodule s/p non-diagnostic VATS and empiric radiation, metastatic papillary thyroid cancer who presented with worsening shortness of breath and 2 falls at home. For a detailed HPI- please refer to H&P done by Dr Marc Calvert on 10/15/2022.     Acute respiratory failure with hypoxia  Possible COPD exacerbation versus community-acquired pneumonia versus radiation pneumonitis versus progression of malignancy  Leukocytosis  *Has had \"2-3 months\" SOB worsening recently, some relief with albuterol. This evening woke with marked SOB, EMS found O2 sats 66%. Responded to CPAP. No fevers, cough, chest pain. In EDafebrile, O2 sats 95% off BiPAP at time of my visit. Lactic 3.6, WBC 16.4. BNP, troponin normal. VBG 7.34/48/32/26. influenza and COVID-19 negative. EKG ST, no ischemic changes.   *CT chest w/o PE, consolidative changes around R upper lobe nodule, also new subpleural consolidation in LLL., emphyysema, worsening mediastinal LAD  - telemetry  - Appreciate pulmonology consultation.  Per their recommendations, patient was started on Solu-Medrol 80 Mg daily for possible radiation " pneumonitis and/or COPD exacerbation.    - also started on Ceftin and Doxycycline for PNA coverage  - duonebs q8 hours scheduled  -Trelegy discontinued per pulmonology in setting of pneumonia; plan to restart at discharge.  - q2 hour albuterol nebs prn  - aggressive IS  - Acapella valve  - Seen by COPD education on 10/18.  Recommended albuterol nebs at discharge.    - neb machine ordered along with Alb neb solution  - Pulm recommended PJP prophylaxis while on high dose of streoids; discussed with pharmacy and Dr Palm recommened to hold off PJP ppx for now  - Follow up with Dr Dumont and with Primary pulmonary provider at [facility] as scheduled.   - as per Pulmonology will slow taper steroids and discharge on prednisone 40 mg once per day decreasing by 10 Mg every 2 weeks until 10 Mg [continue 10 Mg dose until seen by pulmonary].     - supplemental O2 as needed  - sat 91% on RA at rest, drops to to 85% on RA with activity  - supplemental O2 with activity and overnight ordered (there has been concern noted in his chart of him having overnight episodes of apnea/hypoxia.)  -  Would also recommend sleep study as outpatient.  - PT has recommended home with outpatient PT though he would benefit more from home therapies as he still has significant pain in his left hip and anticipates trouble getting in and out of the car to travel for appointments.  - will arrange for home PT/OT/RN, followed by Pulm rehab.   - 10/22- feeling better today, breathing seems better, walked with a walker in the room.      Fall with L leg pain  Prior left LIZABETH  Chronic back pain  States has had balance issues with his L leg for awhile. Fell twice on day of presentation when leg gave out, now worsening pain. No head trauma or neck pain. XR hip w/o fracture.   On chart review, he does have history of chronic bilateral hip pain, worse on the left.  States that he recently followed up with [facility] and had an MRI.  Apparently was called  "and told that he may need an aspiration.  - Pain continues to be a significant issue; started on scheduled Tylenol 975 mg po TID  - PTA on Norco, states it did not help too much, switched to Oxycodone 5-10 mg po q4h prn (helps more).  - XR knee and pelvis with left hip with no acute findings  - CT hip 10/20- Soft tissue edema left proximal thigh. Left LIZABETH. No visible fracture  - [facility] consulted here.  Recommend holding off on any aspirate at this time till current infection is treated.  Follow-up with primary orthopedic surgeon, Dr. Brito as outpatient  - PT/OT  - home PT/OT     CAD with hx Cx stenting 1997  HTN  HLD  [amlodipine 5 mg daily, aspirin 81 mg daily, furosemide 20 mg daily, lisinopril 40 mg daily, metoprolol XL 50 mg day, rosuvastatin 10 mg daily]  - Continue all PTA meds   - follow up with PCP      Lung nodule  *Has lung nodule, PET positive. Attempt at VATS with resection unsuccessful 2/2 scar tissue. Has now undergone XRT treatment to the area.   *CT chest on admit w/o PE, consolidative changes around R upper lobe nodule, also new subpleural consolidation in LLL., emphysema, progression of mediastinal LAD.   - pulmonary consult as above.  Concern for progression of malignancy in differential.     Metastatic papillary thyroid cancer  Hypothyroidism  S/p total thyroidectomy 8/2021 with 4/4 LN positive. Treated with radioactive iodine ablation 9/2021.   - resume levothyroxine 137 mcg daily      Cirrhosis  Noted on CT on admission. This wasn't mentioned on CTs previous that I can see.   -Normal LFTs  - outpatient GI evaluation.  Patient notes having prior abdominal surgery and lots of scarring.  Defer further work-up at this time.  Defer to PCP for further follow-up regarding this.     RA  Follows with rheumatology. Not currently on medications for this     BRENNEN  - intolerant of CPAP     COVID-19 negative    ASSESSMENT           Discharge Assessment  How are you doing now that you are home?: Pt states \"I " "guess I'm doing alright, still having a lot of hip pain, only taking 1 oxycodone in the morning and 1 at HS to save them.\"  States breathing \"good\" with the oxygen; using O2 at 2 L, sats 91-92% on oxygen, drop down to 85-86% without the oxygen.  Taking albuterol nebs TID, morning, afternoon & HS, and the Trelegy every morning.  He reports completed the ATBx, and continues to take the prednisone 40 mg daily as prescribed and will follow taper instructions.  Tylenol TID.  Home care RN was out today for SOC visit, will start PT in the near future.  How are your symptoms? (Red Flag symptoms escalate to triage hotline per guidelines): Unchanged;Improved  Do you feel your condition is stable enough to be safe at home until your provider visit?: Yes  Does the patient have their discharge instructions? : Yes  Does the patient have questions regarding their discharge instructions? : No  Were you started on any new medications or were there changes to any of your previous medications? : Yes  Does the patient have all of their medications?: Yes  Do you have questions regarding any of your medications? : No  Do you have all of your needed medical supplies or equipment (DME)?  (i.e. oxygen tank, CPAP, cane, etc.): Yes (Nebulizer, O2, Walker)  Discharge follow-up appointment scheduled within 14 calendar days? : Yes  Discharge Follow Up Appointment Date: 10/26/22  Discharge Follow Up Appointment Scheduled with?: Primary Care Provider         Post-op (Clinicians Only)  Did the patient have surgery or a procedure: No      PLAN                                                      Outpatient Plan:      Follow up with primary care provider, Yaneli Dozier, within 7 days for hospital follow- up. No follow up labs or test are needed.  Follow up with primary Pulmonologist as scheduled.  Follow up with primary Ortho surgeon of left hip pain persists.  Pulmonary Rehab Referral  Home Care Referral  Medication Therapy Management " Referral    Future Appointments   Date Time Provider Department Center   10/26/2022 11:30 AM Vidya Hall, FRED [facility]    11/2/2022 10:15 AM Lita Hubbard, PT EDPT [address]   11/22/2022  1:30 PM Yaneli Dozier MD [facility]    12/30/2022  8:30 AM  [facility]   12/30/2022  9:00 AM Khoa Handy MD [facility]   1/9/2023 12:20 PM E[facility]CT[facility]   3/8/2023 10:30 AM Demarcus Simmons MD [facility]    8/9/2023 10:20 AM Niles Cedillo MD [facility]         For any urgent concerns, please contact our 24 hour nurse triage line: [phone] ([phone])         Jessa Yun RN

## 2022-10-25 ENCOUNTER — TELEPHONE (OUTPATIENT)
Dept: RESPIRATORY THERAPY | Facility: CLINIC | Age: 77
End: 2022-10-25

## 2022-10-25 NOTE — TELEPHONE ENCOUNTER
Chronic Pulmonary Disease Specialist  Progress Note     Called patient to inquire about respiratory status after discharge from [facility]. Left message with contact information requesting patient to call back.  Will continue to follow and provide support.     Carly Smith RRT, CTTS  Chronic Pulmonary Disease Specialist  Office: [phone]  Pager: [phone]

## 2022-10-26 ENCOUNTER — OFFICE VISIT (OUTPATIENT)
Dept: FAMILY MEDICINE | Facility: CLINIC | Age: 77
End: 2022-10-26
Payer: MEDICARE

## 2022-10-26 VITALS
RESPIRATION RATE: 14 BRPM | WEIGHT: 176 LBS | HEART RATE: 56 BPM | TEMPERATURE: 97.4 F | OXYGEN SATURATION: 97 % | HEIGHT: 69 IN | SYSTOLIC BLOOD PRESSURE: 118 MMHG | BODY MASS INDEX: 26.07 KG/M2 | DIASTOLIC BLOOD PRESSURE: 64 MMHG

## 2022-10-26 DIAGNOSIS — K74.69 OTHER CIRRHOSIS OF LIVER (H): ICD-10-CM

## 2022-10-26 DIAGNOSIS — M25.552 HIP PAIN, LEFT: ICD-10-CM

## 2022-10-26 DIAGNOSIS — J44.9 CHRONIC OBSTRUCTIVE PULMONARY DISEASE, UNSPECIFIED COPD TYPE (H): ICD-10-CM

## 2022-10-26 DIAGNOSIS — Z09 HOSPITAL DISCHARGE FOLLOW-UP: Primary | ICD-10-CM

## 2022-10-26 PROCEDURE — 99495 TRANSJ CARE MGMT MOD F2F 14D: CPT | Performed by: PHYSICIAN ASSISTANT

## 2022-10-26 RX ORDER — OXYCODONE HYDROCHLORIDE 5 MG/1
5 TABLET ORAL 2 TIMES DAILY PRN
Qty: 30 TABLET | Refills: 0 | Status: SHIPPED | OUTPATIENT
Start: 2022-10-26 | End: 2022-10-29

## 2022-10-26 ASSESSMENT — ANXIETY QUESTIONNAIRES
7. FEELING AFRAID AS IF SOMETHING AWFUL MIGHT HAPPEN: SEVERAL DAYS
2. NOT BEING ABLE TO STOP OR CONTROL WORRYING: MORE THAN HALF THE DAYS
8. IF YOU CHECKED OFF ANY PROBLEMS, HOW DIFFICULT HAVE THESE MADE IT FOR YOU TO DO YOUR WORK, TAKE CARE OF THINGS AT HOME, OR GET ALONG WITH OTHER PEOPLE?: SOMEWHAT DIFFICULT
6. BECOMING EASILY ANNOYED OR IRRITABLE: MORE THAN HALF THE DAYS
7. FEELING AFRAID AS IF SOMETHING AWFUL MIGHT HAPPEN: SEVERAL DAYS
4. TROUBLE RELAXING: MORE THAN HALF THE DAYS
IF YOU CHECKED OFF ANY PROBLEMS ON THIS QUESTIONNAIRE, HOW DIFFICULT HAVE THESE PROBLEMS MADE IT FOR YOU TO DO YOUR WORK, TAKE CARE OF THINGS AT HOME, OR GET ALONG WITH OTHER PEOPLE: SOMEWHAT DIFFICULT
1. FEELING NERVOUS, ANXIOUS, OR ON EDGE: MORE THAN HALF THE DAYS
5. BEING SO RESTLESS THAT IT IS HARD TO SIT STILL: SEVERAL DAYS
GAD7 TOTAL SCORE: 12
GAD7 TOTAL SCORE: 12
3. WORRYING TOO MUCH ABOUT DIFFERENT THINGS: MORE THAN HALF THE DAYS

## 2022-10-26 ASSESSMENT — PAIN SCALES - GENERAL: PAINLEVEL: SEVERE PAIN (6)

## 2022-10-26 NOTE — TELEPHONE ENCOUNTER
Pt returned this writer's phone call with request to call his cell phone. Called patient back to inquire about respiratory status. Patient was discharged from [facility] on Oct 19th with oxygen and a nebulizer compressor supplied by [facility]. Patient states he feels better than prior to his hospitalization and can breathe easier. He is taking Albuterol solution twice daily along with his Trelegy Ellipta inhaler. He states he thinks he is improving with his breath in using the Aerobika, he can exhale for 3 seconds continuously compared to only 1-2 seconds.   Provided continuous encouragement and support.  Agreed to future calls.    -Will continue to follow patient.  Carly Trevino (Olson), PAULA, CTTS  Chronic Pulmonary Disease Specialist  Office: [phone]  Pager: [phone]  Hours: M-F 8-4:30

## 2022-10-26 NOTE — PATIENT INSTRUCTIONS
Please make an appointment with Dr. Brito for your hip pain     GI referral for possible cirrhosis    Please see Dr. Dozier in next 1-2 months

## 2022-10-26 NOTE — PROGRESS NOTES
Assessment and Plan:     (Z09) Hospital discharge follow-up  (primary encounter diagnosis)  Comment: See full history below, admitted for acute hypoxic respiratory failure, following with pulmonology, has an outpatient visit on 12/30/2022, his biggest issue is his left hip pain right now  Plan: CANCELED: Comprehensive metabolic panel (BMP +         Alb, Alk Phos, ALT, AST, Total. Bili, TP)    (M25.552) Hip pain, left  Comment: Acute on chronic, following with Dr. Morse his orthopedist is status post left total hip replacement  Plan: oxyCODONE (ROXICODONE) 5 MG tablet  I refilled 30 oxycodone today, further refills will need to come from his PCP he knows not to mix with hydrocodone    (J44.9) Chronic obstructive pulmonary disease, unspecified COPD type (H)  Comment on long prednisone taper, nebs, inhalers and supplemental oxygen, overall feels his breathing is pretty good  Plan: Continue above    (K74.69) Other cirrhosis of liver (H)  Comment: This was seen as an incidental finding on chest CT, liver function tests were normal, ?  No history of cirrhosis or liver disease  Plan: Adult GI  Referral - Consult Only      Vidya Silveira PA-C  45 minutes on the day of the encounter doing chart review, history and exam, documentation and further activities as noted above.        Artur Terry is a 77 year old presenting for the following health issues:  No chief complaint on file.      HPI     Post Discharge Outreach 10/24/2022   Admission Date 10/15/2022   Reason for Admission Acute respiratory failure with hypoxia  Possible COPD exacerbation versus community-acquired pneumonia versus radiation pneumonitis versus progression of malignancy  Left hi pain  Falls  Lung nodule   Discharge Date 10/21/2022   Discharge Diagnosis Acute respiratory failure with hypoxia  Possible COPD exacerbation versus community-acquired pneumonia versus radiation pneumonitis versus progression of malignancy  Left hi pain  Falls  " Lung nodule   How are you doing now that you are home? Pt states \"I guess I'm doing alright, still having a lot of hip pain, only taking 1 oxycodone in the morning and 1 at HS to save them.\"  States breathing \"good\" with the oxygen; using O2 at 2 L, sats 91-92% on oxygen, drop down to 85-86% without the oxygen.  Taking albuterol nebs TID, morning, afternoon & HS, and the Trelegy every morning.  He reports completed the ATBx, and continues to take the prednisone 40 mg daily as prescribed and will follow taper instructions.  Tylenol TID.  Home care RN was out today for SOC visit, will start PT in the near future.   How are your symptoms? (Red Flag symptoms escalate to triage hotline per guidelines) Unchanged;Improved   Do you feel your condition is stable enough to be safe at home until your provider visit? Yes   Does the patient have their discharge instructions?  Yes   Does the patient have questions regarding their discharge instructions?  No   Were you started on any new medications or were there changes to any of your previous medications?  Yes   Does the patient have all of their medications? Yes   Do you have questions regarding any of your medications?  No   Do you have all of your needed medical supplies or equipment (DME)?  (i.e. oxygen tank, CPAP, cane, etc.) Yes   Discharge follow-up appointment scheduled within 14 calendar days?  Yes   Discharge Follow Up Appointment Date 10/26/2022   Discharge Follow Up Appointment Scheduled with? Primary Care Provider     Hospital Follow-up Visit:    Hospital/Nursing Home/IP Rehab Facility:  [facility]  Date of Admission: 10/15/2022  Date of Discharge: 10/21/2022  Reason(s) for Admission: Acute Respiratory Failure    Was your hospitalization related to COVID-19? No   Problems taking medications regularly:  None  Medication changes since discharge: None  Problems adhering to non-medication therapy:  None    Summary of hospitalization:  MELE Payne" "[place] hospital discharge summary reviewed  Diagnostic Tests/Treatments reviewed.  Follow up needed: see below      Plan of care communicated with patient               Harrison is here for hospital follow-up    He was admitted 10/15/2022 and discharged 10/21/2022 with acute hypoxic respiratory failure, possibly due to COPD versus CAP versus radiation pneumonitis.  He had a chest CT that was negative for PE but showed consolidative changes in the right upper lobe and new subpleural consolidative consolidation in the left lower lobe with worsening mediastinal LAD.  He was seen by pulmonology and discharged on a long prednisone taper and home oxygen.  He has follow-up with pulmonology on 12/30/2022.  He also had a fall and has had some acute on chronic left hip pain.  CT of his left hip showed  No visible fracture.  He was seen by orthopedics while in the hospital and it was recommended that he follow-up with his orthopedic surgeon Dr. Morse.    He also had incidental finding of liver cirrhosis on chest CT, I wonder if this could be incorrect as previous CT scans have not shown cirrhosis.  He has not scheduled any follow-up with GI.  He has been doing okay since discharge   He sees pulmonology on 12/30/22     He is still tapering prednisone, he is currently on 40mg daily     His left hip pain is his biggest issue right now.  He has not scheduled follow-up with his orthopedist yet.  He stopped his hydrocodone and has been taking oxycodone from the hospital at home.  He would like a refill of this.  He knows not to take them both together.      Review of Systems   See above      Objective      /64 (BP Location: Left arm, Patient Position: Sitting, Cuff Size: Adult Regular)   Pulse 56   Temp 97.4  F (36.3  C) (Tympanic)   Resp 14   Ht 1.753 m (5' 9\")   Wt 79.8 kg (176 lb)   SpO2 97%   BMI 25.99 kg/m        Physical Exam     GENERAL: in NAD  RESP: Diminished bilaterally but no rales, no rhonchi, no wheezes, " moving air well, able to speak in full sentences, normal work of breathing  CV: regular rates and rhythm, normal S1 S2, no S3 or S4 and no murmur, no click or rub   MS: extremities- no gross deformities noted, trace edema bilateral lower extremities  SKIN: no suspicious lesions, no rashes

## 2022-10-28 ENCOUNTER — MEDICAL CORRESPONDENCE (OUTPATIENT)
Dept: HEALTH INFORMATION MANAGEMENT | Facility: CLINIC | Age: 77
End: 2022-10-28

## 2022-10-31 ENCOUNTER — TELEPHONE (OUTPATIENT)
Dept: FAMILY MEDICINE | Facility: CLINIC | Age: 77
End: 2022-10-31

## 2022-10-31 NOTE — TELEPHONE ENCOUNTER
Spoke to Jorge with [facility] to give verbal for orders below.  Rachelle Alvarez RN  -[facility]

## 2022-10-31 NOTE — TELEPHONE ENCOUNTER
Veronica NELSON from [facility] calling to request verbal orders for the following:    OT 1x per week for 3 weeks     Verbal approval give per RN protocol    Ana Gong RN  [facility]

## 2022-10-31 NOTE — TELEPHONE ENCOUNTER
Order/Referral Request    [facility]    Orders being requested: PT 2x a week for 2 weeks, followed by 1x a week for 4 weeks    Reason service is needed/diagnosis: pt had a serious fall 2 weeks ago, shoulder and hip pain    When are orders needed by: when able    Call patient  ok to chani Chanel/Tamanna-  [facility]

## 2022-10-31 NOTE — TELEPHONE ENCOUNTER
PCP,    Please advise if you're able to follow for home care. Order started in hospital/TCU.     Per chart review, patient was seen in ER for falls on 10/15/22.    Rachelle Alvarez RN  [facility]

## 2022-11-01 ENCOUNTER — TELEPHONE (OUTPATIENT)
Dept: FAMILY MEDICINE | Facility: CLINIC | Age: 77
End: 2022-11-01

## 2022-11-01 NOTE — TELEPHONE ENCOUNTER
Lexis, nurse visiting pt today reports FYI    Pt has a RR of 28 at rest, maybe down to 24 by end of visit.  Has COPD.   O2 93% on RA, and does have supplemental oxygen available if needed.   Otherwise no other complaints, just letting us know of elevated RR.     Nora Irwin, RN  [facility] RN Triage Team

## 2022-11-03 ENCOUNTER — TELEPHONE (OUTPATIENT)
Dept: FAMILY MEDICINE | Facility: CLINIC | Age: 77
End: 2022-11-03

## 2022-11-03 DIAGNOSIS — M25.562 ARTHRALGIA OF LEFT LOWER LEG: ICD-10-CM

## 2022-11-03 NOTE — TELEPHONE ENCOUNTER
Patient was switched from Hydrocodone to Oxycodone while in hospital.    Patient requesting pain medication to be switched back to Hydrocodone. Patient states Oxycodone makes him feel 'high' and more sleepy, and does not like that. States he did not feel this way with Hydrocodone.       Can we leave a detailed message on this number? YES  Phone number patient can be reached at: Cell number on file:    Telephone Information:   Mobile [phone]       Opal Wilkerson RN  [facility] Triage

## 2022-11-04 ENCOUNTER — MEDICAL CORRESPONDENCE (OUTPATIENT)
Dept: HEALTH INFORMATION MANAGEMENT | Facility: CLINIC | Age: 77
End: 2022-11-04

## 2022-11-07 ENCOUNTER — TELEPHONE (OUTPATIENT)
Dept: FAMILY MEDICINE | Facility: CLINIC | Age: 77
End: 2022-11-07

## 2022-11-07 RX ORDER — OXYCODONE HYDROCHLORIDE 5 MG/1
5 TABLET ORAL 3 TIMES DAILY PRN
Qty: 90 TABLET | Refills: 0 | Status: SHIPPED | OUTPATIENT
Start: 2022-11-07 | End: 2022-12-14

## 2022-11-07 NOTE — TELEPHONE ENCOUNTER
Reason for call:  Other   Patient called regarding (reason for call): call back  Additional comments: Jorge from, Physical Therapist with [facility], states that Harrison is doing exceptionally well with his therapy. And therefore, we do not need to complete the 5 remaining visits, we are only going to do 3 visits. So only needs 3 more visits to meet his rehab goals. Jorge just needs the verbal ok that they are ok with that plan. PT sees Dr Dozier.     Phone number to reach patient:  Other phone number:  [phone]*    Best Time:  Anytime    Can we leave a detailed message on this number?  YES    Travel screening: Not Applicable

## 2022-11-09 ENCOUNTER — MEDICAL CORRESPONDENCE (OUTPATIENT)
Dept: HEALTH INFORMATION MANAGEMENT | Facility: CLINIC | Age: 77
End: 2022-11-09

## 2022-11-10 NOTE — TELEPHONE ENCOUNTER
Left message notifying Jorge BRADFORD of PCP response below    Zaria HAYS Triage RN  [facility]

## 2022-11-10 NOTE — TELEPHONE ENCOUNTER
PCP see below   Okay for early discontinuation of PT home care orders due to meeting goals?     Zaria HAYS, Triage RN  [facility]      "Maria Victoria Oviedo, MS, RD, LD, [facility]   6C Pgr: [phone]

## 2022-11-11 ENCOUNTER — MEDICAL CORRESPONDENCE (OUTPATIENT)
Dept: HEALTH INFORMATION MANAGEMENT | Facility: CLINIC | Age: 77
End: 2022-11-11

## 2022-11-14 ENCOUNTER — TELEPHONE (OUTPATIENT)
Dept: FAMILY MEDICINE | Facility: CLINIC | Age: 77
End: 2022-11-14

## 2022-11-14 DIAGNOSIS — I10 BENIGN ESSENTIAL HYPERTENSION: ICD-10-CM

## 2022-11-14 RX ORDER — LISINOPRIL 40 MG/1
TABLET ORAL
Qty: 90 TABLET | Refills: 0 | Status: SHIPPED | OUTPATIENT
Start: 2022-11-14 | End: 2022-12-30

## 2022-11-14 NOTE — TELEPHONE ENCOUNTER
Prescription approved per [facility] Refill Protocol.      Lauren Lezama, RN BSN MSN  [facility]

## 2022-11-14 NOTE — TELEPHONE ENCOUNTER
Reason for Call:  Other appointment    Detailed comments: patients wife Adeola called today and states that patient would like to see only Tasia Hooker at [facility] after 2PM.      Diagnosis COPD.  Patient is in assisted living right now and is having low oxygen.    Patient has a nurse coming to see him at 12:00 PM today.  RN is aware of issues.    Please contact wife (if consent).  Thank you.    Phone Number Patient can be reached at: Other phone number:  [phone] Adeola*    Best Time: any    Can we leave a detailed message on this number? YES    Call taken on 11/14/2022 at 10:26 AM by Elba Stiles

## 2022-11-14 NOTE — TELEPHONE ENCOUNTER
Dr. Dozier,    Spoke to Harrison and his wife regarding below..  Was not doing great earlier today, but much better now.  Wife reports O2 was running 80-83% on 2L by NC, but then pt improved throughout day, they increased O2 to 3L, and now running 94% on 3L, and Harrison states is feeling better on this.     Did titrate down on Prednisone from 40mg to 30mg last Sunday per plan - Wife asking if he should take 35mg prednisone instead of 30 right now?    Did tell wife if his oxygen goes down to 80 despite going to 3L,  that he should be seen in ER.       Pt has visit with you 11/22/22 currently - Can this be moved up to any time this week either virtually or in person?    Nora Irwin, RN  [facility] RN Triage Team

## 2022-11-15 ENCOUNTER — ANCILLARY PROCEDURE (OUTPATIENT)
Dept: GENERAL RADIOLOGY | Facility: CLINIC | Age: 77
End: 2022-11-15
Attending: INTERNAL MEDICINE
Payer: MEDICARE

## 2022-11-15 ENCOUNTER — OFFICE VISIT (OUTPATIENT)
Dept: FAMILY MEDICINE | Facility: CLINIC | Age: 77
End: 2022-11-15
Payer: MEDICARE

## 2022-11-15 VITALS
BODY MASS INDEX: 26.02 KG/M2 | SYSTOLIC BLOOD PRESSURE: 132 MMHG | DIASTOLIC BLOOD PRESSURE: 72 MMHG | HEART RATE: 63 BPM | OXYGEN SATURATION: 89 % | TEMPERATURE: 97.7 F | WEIGHT: 176.2 LBS

## 2022-11-15 DIAGNOSIS — G47.33 OSA (OBSTRUCTIVE SLEEP APNEA): ICD-10-CM

## 2022-11-15 DIAGNOSIS — R79.9 ABNORMAL FINDING OF BLOOD CHEMISTRY, UNSPECIFIED: ICD-10-CM

## 2022-11-15 DIAGNOSIS — F41.1 GAD (GENERALIZED ANXIETY DISORDER): ICD-10-CM

## 2022-11-15 DIAGNOSIS — G89.29 CHRONIC RIGHT-SIDED LOW BACK PAIN WITH RIGHT-SIDED SCIATICA: ICD-10-CM

## 2022-11-15 DIAGNOSIS — M25.552 HIP PAIN, LEFT: ICD-10-CM

## 2022-11-15 DIAGNOSIS — G72.0 STEROID-INDUCED MYOPATHY: ICD-10-CM

## 2022-11-15 DIAGNOSIS — J44.1 CHRONIC OBSTRUCTIVE PULMONARY DISEASE WITH ACUTE EXACERBATION (H): ICD-10-CM

## 2022-11-15 DIAGNOSIS — M54.41 CHRONIC RIGHT-SIDED LOW BACK PAIN WITH RIGHT-SIDED SCIATICA: ICD-10-CM

## 2022-11-15 DIAGNOSIS — F11.90 CHRONIC, CONTINUOUS USE OF OPIOIDS: ICD-10-CM

## 2022-11-15 DIAGNOSIS — T38.0X5A STEROID-INDUCED MYOPATHY: ICD-10-CM

## 2022-11-15 DIAGNOSIS — F43.21 SITUATIONAL DEPRESSION: ICD-10-CM

## 2022-11-15 DIAGNOSIS — M05.79 RHEUMATOID ARTHRITIS INVOLVING MULTIPLE SITES WITH POSITIVE RHEUMATOID FACTOR (H): Primary | ICD-10-CM

## 2022-11-15 DIAGNOSIS — K74.69 OTHER CIRRHOSIS OF LIVER (H): ICD-10-CM

## 2022-11-15 PROBLEM — J96.91 RESPIRATORY FAILURE WITH HYPOXIA (H): Status: RESOLVED | Noted: 2022-10-15 | Resolved: 2022-11-15

## 2022-11-15 LAB
ALBUMIN SERPL BCG-MCNC: 3.8 G/DL (ref 3.5–5.2)
ALP SERPL-CCNC: 101 U/L (ref 40–129)
ALT SERPL W P-5'-P-CCNC: 61 U/L (ref 10–50)
ANION GAP SERPL CALCULATED.3IONS-SCNC: 15 MMOL/L (ref 7–15)
AST SERPL W P-5'-P-CCNC: 20 U/L (ref 10–50)
BILIRUB SERPL-MCNC: 0.8 MG/DL
BUN SERPL-MCNC: 23.8 MG/DL (ref 8–23)
CALCIUM SERPL-MCNC: 9.5 MG/DL (ref 8.8–10.2)
CHLORIDE SERPL-SCNC: 94 MMOL/L (ref 98–107)
CREAT SERPL-MCNC: 1.18 MG/DL (ref 0.67–1.17)
CRP SERPL-MCNC: 97.9 MG/L
DEPRECATED HCO3 PLAS-SCNC: 28 MMOL/L (ref 22–29)
ERYTHROCYTE [DISTWIDTH] IN BLOOD BY AUTOMATED COUNT: 14 % (ref 10–15)
ERYTHROCYTE [SEDIMENTATION RATE] IN BLOOD BY WESTERGREN METHOD: 18 MM/HR (ref 0–20)
GFR SERPL CREATININE-BSD FRML MDRD: 64 ML/MIN/1.73M2
GLUCOSE SERPL-MCNC: 79 MG/DL (ref 70–99)
HBA1C MFR BLD: 5.7 % (ref 0–5.6)
HCT VFR BLD AUTO: 44 % (ref 40–53)
HGB BLD-MCNC: 14.3 G/DL (ref 13.3–17.7)
MCH RBC QN AUTO: 29.4 PG (ref 26.5–33)
MCHC RBC AUTO-ENTMCNC: 32.5 G/DL (ref 31.5–36.5)
MCV RBC AUTO: 90 FL (ref 78–100)
PLATELET # BLD AUTO: 171 10E3/UL (ref 150–450)
POTASSIUM SERPL-SCNC: 5.3 MMOL/L (ref 3.4–5.3)
PROT SERPL-MCNC: 6.6 G/DL (ref 6.4–8.3)
RBC # BLD AUTO: 4.87 10E6/UL (ref 4.4–5.9)
SODIUM SERPL-SCNC: 137 MMOL/L (ref 136–145)
WBC # BLD AUTO: 11.3 10E3/UL (ref 4–11)

## 2022-11-15 PROCEDURE — 36415 COLL VENOUS BLD VENIPUNCTURE: CPT | Performed by: INTERNAL MEDICINE

## 2022-11-15 PROCEDURE — 83036 HEMOGLOBIN GLYCOSYLATED A1C: CPT | Performed by: INTERNAL MEDICINE

## 2022-11-15 PROCEDURE — 86140 C-REACTIVE PROTEIN: CPT | Performed by: INTERNAL MEDICINE

## 2022-11-15 PROCEDURE — 85652 RBC SED RATE AUTOMATED: CPT | Performed by: INTERNAL MEDICINE

## 2022-11-15 PROCEDURE — 80053 COMPREHEN METABOLIC PANEL: CPT | Performed by: INTERNAL MEDICINE

## 2022-11-15 PROCEDURE — 71046 X-RAY EXAM CHEST 2 VIEWS: CPT | Mod: TC | Performed by: RADIOLOGY

## 2022-11-15 PROCEDURE — 99215 OFFICE O/P EST HI 40 MIN: CPT | Performed by: INTERNAL MEDICINE

## 2022-11-15 PROCEDURE — 85027 COMPLETE CBC AUTOMATED: CPT | Performed by: INTERNAL MEDICINE

## 2022-11-15 RX ORDER — DULOXETIN HYDROCHLORIDE 20 MG/1
20 CAPSULE, DELAYED RELEASE ORAL 2 TIMES DAILY
Qty: 180 CAPSULE | Refills: 3 | Status: SHIPPED | OUTPATIENT
Start: 2022-11-15 | End: 2022-11-22

## 2022-11-15 RX ORDER — AZITHROMYCIN 250 MG/1
TABLET, FILM COATED ORAL
Qty: 6 TABLET | Refills: 0 | Status: SHIPPED | OUTPATIENT
Start: 2022-11-15 | End: 2022-11-20

## 2022-11-15 ASSESSMENT — ANXIETY QUESTIONNAIRES
4. TROUBLE RELAXING: MORE THAN HALF THE DAYS
6. BECOMING EASILY ANNOYED OR IRRITABLE: SEVERAL DAYS
3. WORRYING TOO MUCH ABOUT DIFFERENT THINGS: SEVERAL DAYS
GAD7 TOTAL SCORE: 10
7. FEELING AFRAID AS IF SOMETHING AWFUL MIGHT HAPPEN: SEVERAL DAYS
7. FEELING AFRAID AS IF SOMETHING AWFUL MIGHT HAPPEN: SEVERAL DAYS
2. NOT BEING ABLE TO STOP OR CONTROL WORRYING: MORE THAN HALF THE DAYS
IF YOU CHECKED OFF ANY PROBLEMS ON THIS QUESTIONNAIRE, HOW DIFFICULT HAVE THESE PROBLEMS MADE IT FOR YOU TO DO YOUR WORK, TAKE CARE OF THINGS AT HOME, OR GET ALONG WITH OTHER PEOPLE: SOMEWHAT DIFFICULT
1. FEELING NERVOUS, ANXIOUS, OR ON EDGE: MORE THAN HALF THE DAYS
GAD7 TOTAL SCORE: 10
8. IF YOU CHECKED OFF ANY PROBLEMS, HOW DIFFICULT HAVE THESE MADE IT FOR YOU TO DO YOUR WORK, TAKE CARE OF THINGS AT HOME, OR GET ALONG WITH OTHER PEOPLE?: SOMEWHAT DIFFICULT
5. BEING SO RESTLESS THAT IT IS HARD TO SIT STILL: SEVERAL DAYS

## 2022-11-15 ASSESSMENT — PATIENT HEALTH QUESTIONNAIRE - PHQ9
10. IF YOU CHECKED OFF ANY PROBLEMS, HOW DIFFICULT HAVE THESE PROBLEMS MADE IT FOR YOU TO DO YOUR WORK, TAKE CARE OF THINGS AT HOME, OR GET ALONG WITH OTHER PEOPLE: SOMEWHAT DIFFICULT
SUM OF ALL RESPONSES TO PHQ QUESTIONS 1-9: 19
SUM OF ALL RESPONSES TO PHQ QUESTIONS 1-9: 19

## 2022-11-15 NOTE — PROGRESS NOTES
This is a very complex patient  He has cirrhosis of the liver so methotrexate was stopped but now he has rheumatoid arthritis flareup  He is on prednisone which he is not tolerating very well although despite prednisone he has a COPD flareup  We will continue to taper prednisone  But I will communicate with rheumatology and get back to family about what to do for rheumatoid arthritis    We will give him a Z-Juan and take a chest x-ray    I think reducing prednisone dose will also help with the myopathy that he is developing from steroids    In regards to depression and anxiety we will try to cut prednisone  I also think that he needs to go back on duloxetine which was stopped during hospital stay with 20 mg a day and on Sunday 20 mg twice a day and see him back within a week    In addition patient will continue his oxycodone and Tylenol for pain control    We might like to line up more physical therapy although he has home care    His cancer remains in remission and thoracic surgery as well as endocrinology notes are appreciated    ICD-10-CM    1. Rheumatoid arthritis involving multiple sites with positive rheumatoid factor (H)  M05.79 CRP, inflammation     ESR: Erythrocyte sedimentation rate     CRP, inflammation     ESR: Erythrocyte sedimentation rate      2. Chronic obstructive pulmonary disease with acute exacerbation (H)  J44.1 XR Chest 2 Views     azithromycin (ZITHROMAX) 250 MG tablet     CBC with Platelets       CRP, inflammation     CBC with Platelets       CRP, inflammation      3. Hip pain, left  M25.552       4. Other cirrhosis of liver (H)  K74.69 Comprehensive metabolic panel     Comprehensive metabolic panel      5. PAVAN (generalized anxiety disorder)  F41.1 DULoxetine (CYMBALTA) 20 MG capsule      6. Situational depression  F43.21 DULoxetine (CYMBALTA) 20 MG capsule      7. Chronic, continuous use of opioids  F11.90       8. Chronic right-sided low back pain with right-sided sciatica  M54.41     G89.29        9. 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 Abnormal finding of blood chemistry, unspecified  R79.9 Hemoglobin A1c     Hemoglobin A1c            Artur Terry is a 77 year old, presenting for the following health issues:  Pain (Chronic ) and Blood Draw (For Dr. Almaraz labs ordered in Sept )  This very pleasant patient is accompanied by his attentive wife  After the last fall and hospitalization in October patient has been nervous about ongoing left hip pain and right-sided sciatica  He is afraid of falling  He is thought to have rheumatoid arthritis flareup and methotrexate was stopped because of liver disease  He however remains on 30 mg of prednisone at present  Since yesterday his oxygen level dropped below 89 and he feels a little bit more short of breath  His rheumatoid flareup is about the same  Is not coughing more phlegm and does not have fever  He definitely notices more anxiety and other symptoms as below    History of Present Illness       Back Pain:  He presents for follow up of back pain. Patient's back pain is a chronic problem.  Location of back pain:  Right middle of back, left middle of back, right buttock, left buttock, right hip and left hip  Description of back pain: dull ache and sharp  Back pain spreads: right buttocks and right thigh    Since patient first noticed back pain, pain is: unchanged  Does back pain interfere with his job:  Not applicable      COPD:  He presents for follow up of COPD.  Overall, COPD symptoms are much worse since last visit. He has a lot more than usual fatigue or shortness of breath with exertion and more than usual shortness of breath at rest.  He sometimes coughs and does not have change in sputum. No recent fever. He can walk the length of 3-5 rooms without stopping to rest. He can not walk a flight of stairs without resting. The patient has had an ED, urgent  care, or hospital admission because of COPD since the last visit. He states he has had 1 visit(s) to an ED, Urgent Care, or Hospital due to his COPD.    Mental Health Follow-up:  Patient presents to follow-up on Depression & Anxiety.Patient's depression since last visit has been:  Better  The patient is having other symptoms associated with depression.  Patient's anxiety since last visit has been:  Bad  The patient is having other symptoms associated with anxiety.  Any significant life events: health concerns  Patient is feeling anxious or having panic attacks.  Patient has no concerns about alcohol or drug use.    Reason for visit:  Oxygen back pain    He eats 2-3 servings of fruits and vegetables daily.He consumes 0 sweetened beverage(s) daily.He exercises with enough effort to increase his heart rate 10 to 19 minutes per day.  He exercises with enough effort to increase his heart rate 6 days per week.   He is taking medications regularly.    Today's PHQ-9         PHQ-9 Total Score: 19    PHQ-9 Q9 Thoughts of better off dead/self-harm past 2 weeks :   Not at all    How difficult have these problems made it for you to do your work, take care of things at home, or get along with other people: Somewhat difficult  Today's PAVAN-7 Score: 10             Review of Systems   10 point ROS of systems including Constitutional, Eyes, Respiratory, Cardiovascular, Gastroenterology, Genitourinary, Integumentary, Muscularskeletal, Psychiatric were all negative except for pertinent positives noted in my HPI.        Objective    /72   Pulse 63   Temp 97.7  F (36.5  C) (Temporal)   Wt 79.9 kg (176 lb 3.2 oz)   SpO2 (!) 89%   BMI 26.02 kg/m    Body mass index is 26.02 kg/m .  Physical Exam   GENERAL: healthy, alert and no distress  NECK: no adenopathy, no asymmetry, masses, or scars and thyroid normal to palpation  RESP: lungs clear to auscultation - no rales, rhonchi or wheezes  CV: regular rate and rhythm, normal S1 S2, no  S3 or S4, no murmur, click or rub, no peripheral edema and peripheral pulses strong  ABDOMEN: soft, nontender, no hepatosplenomegaly, no masses and bowel sounds normal  MS: no gross musculoskeletal defects noted, no edema    His back is very poor and muscles are weak  Psychiatric exam reveals insight and judgment intact with anxiety    TSH   Date Value Ref Range Status   03/16/2022 2.44 0.40 - 4.00 mU/L Final   06/03/2021 0.92 0.40 - 4.00 mU/L Final     LDL Cholesterol Calculated   Date Value Ref Range Status   05/02/2022 94 <=100 mg/dL Final   03/03/2021 83 <100 mg/dL Final     Comment:     Desirable:       <100 mg/dl     Lab Results   Component Value Date    HGB 12.9 10/19/2022    HGB 13.2 07/02/2021     CRP Inflammation   Date Value Ref Range Status   10/19/2022 8.1 (H) 0.0 - 8.0 mg/L Final   03/03/2021 3.2 0.0 - 8.0 mg/L Final     Patient Active Problem List   Diagnosis     Essential hypertension, benign     Pain in joint, upper arm     Allergic rhinitis     Pain in joint, lower leg     Chronic airway obstruction (H)     Monoclonal paraproteinemia     Immunodeficiency (H)     Eczema     COPD (chronic obstructive pulmonary disease) (H)     Transient cerebral ischemia     Bunion     Occipital neuralgia     HYPERLIPIDEMIA LDL GOAL <100     Health Care Home     Low back pain     Advanced directives, counseling/discussion     Olecranon bursitis of right elbow     Bruit     Retina hole     signed & scanned on 09/23/2011  (1-4-2013 printed but not scanned in)      Chronic, continuous use of opioids     Atherosclerosis of native coronary artery of native heart without angina pectoris     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, unspecified thyrotoxicosis type     Right-sided low back pain with right-sided sciatica     SOB (shortness of breath)     Coronary artery disease involving native coronary artery of native heart without angina pectoris     BRENNEN (obstructive sleep apnea)     BPH (benign prostatic hyperplasia)      Rheumatoid arthritis (H)     Hammer toe of right foot     Hallux limitus of right foot     Corn of toe     Non-recurrent unilateral inguinal hernia with obstruction without gangrene     Left inguinal hernia     Metastasis from thyroid cancer (H)     Nodule of upper lobe of right lung     Preoperative examination     Current Outpatient Medications   Medication     azithromycin (ZITHROMAX) 250 MG tablet     DULoxetine (CYMBALTA) 20 MG capsule     acetaminophen (TYLENOL) 325 MG tablet     albuterol (ACCUNEB) 1.25 MG/3ML neb solution     albuterol (PROAIR HFA/PROVENTIL HFA/VENTOLIN HFA) 108 (90 Base) MCG/ACT inhaler     amLODIPine (NORVASC) 5 MG tablet     Ascorbic Acid (VITAMIN C PO)     aspirin 81 MG tablet     calcium carbonate (OS-KARLIE) 1500 (600 Ca) MG tablet     Carisoprodol 250 MG TABS     cholecalciferol 25 MCG (1000 UT) TABS     FISH OIL 1000 MG OR CAPS     Fluticasone-Umeclidin-Vilanterol (TRELEGY ELLIPTA) 100-62.5-25 MCG/INH oral inhaler     furosemide (LASIX) 20 MG tablet     guaiFENesin (MUCINEX) 600 MG 12 hr tablet     levothyroxine (SYNTHROID/LEVOTHROID) 137 MCG tablet     Lidocaine (LIDOCARE) 4 % Patch     lisinopril (ZESTRIL) 40 MG tablet     metoprolol succinate ER (TOPROL XL) 50 MG 24 hr tablet     multivitamin w/minerals (THERA-VIT-M) tablet     naloxone (NARCAN) 4 MG/0.1ML nasal spray     nitroGLYcerin (NITROSTAT) 0.4 MG sublingual tablet     oxyCODONE (ROXICODONE) 5 MG tablet     predniSONE (DELTASONE) 10 MG tablet     rosuvastatin (CRESTOR) 10 MG tablet     senna-docusate (SENOKOT-S/PERICOLACE) 8.6-50 MG tablet     sildenafil (VIAGRA) 100 MG tablet     No current facility-administered medications for this visit.     Glucose   Date Value Ref Range Status   10/19/2022 93 70 - 99 mg/dL Final   10/15/2022 140 (H) 70 - 99 mg/dL Final   03/16/2022 99 70 - 99 mg/dL Final   01/21/2022 102 (H) 70 - 99 mg/dL Final   12/29/2021 86 70 - 99 mg/dL Final   07/02/2021 94 70 - 99 mg/dL Final   06/25/2021 134 (H) 70  - 99 mg/dL Final   06/03/2021 87 70 - 99 mg/dL Final   05/10/2021 102 (H) 70 - 99 mg/dL Final   04/22/2021 86 74 - 106 mg/dL Final

## 2022-11-22 ENCOUNTER — VIRTUAL VISIT (OUTPATIENT)
Dept: FAMILY MEDICINE | Facility: CLINIC | Age: 77
End: 2022-11-22
Payer: MEDICARE

## 2022-11-22 DIAGNOSIS — M06.9 FLARE OF RHEUMATOID ARTHRITIS (H): ICD-10-CM

## 2022-11-22 DIAGNOSIS — C79.9 METASTASIS FROM THYROID CANCER (H): ICD-10-CM

## 2022-11-22 DIAGNOSIS — F41.1 GAD (GENERALIZED ANXIETY DISORDER): ICD-10-CM

## 2022-11-22 DIAGNOSIS — C73 METASTASIS FROM THYROID CANCER (H): ICD-10-CM

## 2022-11-22 DIAGNOSIS — J44.1 COPD EXACERBATION (H): Primary | ICD-10-CM

## 2022-11-22 DIAGNOSIS — F43.21 SITUATIONAL DEPRESSION: ICD-10-CM

## 2022-11-22 DIAGNOSIS — G89.29 OTHER CHRONIC PAIN: ICD-10-CM

## 2022-11-22 PROCEDURE — 99214 OFFICE O/P EST MOD 30 MIN: CPT | Mod: 95 | Performed by: INTERNAL MEDICINE

## 2022-11-22 RX ORDER — HYDROXYZINE HYDROCHLORIDE 10 MG/1
10 TABLET, FILM COATED ORAL 3 TIMES DAILY PRN
Qty: 90 TABLET | Refills: 3 | Status: SHIPPED | OUTPATIENT
Start: 2022-11-22 | End: 2023-06-12

## 2022-11-22 RX ORDER — DULOXETIN HYDROCHLORIDE 20 MG/1
20 CAPSULE, DELAYED RELEASE ORAL 2 TIMES DAILY
Qty: 180 CAPSULE | Refills: 3 | Status: ON HOLD | OUTPATIENT
Start: 2022-11-22 | End: 2023-04-23

## 2022-11-22 NOTE — NURSING NOTE
I called radiology scheduling asking them to call pt to sched CT.  Pt might want to save some steps and have lab drawn at [facility] rather than coming over to our [facility]

## 2022-11-22 NOTE — PROGRESS NOTES
Harrison is a 77 year old who is being evaluated via a billable video visit.      How would you like to obtain your AVS? MyChart  If the video visit is dropped, the invitation should be resent by: Text to cell phone: [phone]  Will anyone else be joining your video visit? No        Assessment & Plan     COPD exacerbation (H)  Patient is not feeling well  He is still short of breath but not coughing he is also extremely tired and has chills  Pulmonary embolism should be ruled out and lung metastasis should be ruled out  Differential diagnosis would be progression of lung cancer  Patient and his wife voiced understanding  - CT Chest Pulmonary Embolism w Contrast  - CBC with Platelets    - Comprehensive metabolic panel  - CRP, inflammation  - hydrOXYzine (ATARAX) 10 MG tablet  Dispense: 90 tablet; Refill: 3    Flare of rheumatoid arthritis (H)  Patient's rheumatologist has yet not returned my message back and I will let him know after the CT  - CT Chest Pulmonary Embolism w Contrast    Metastasis from thyroid cancer (H)  As above  - CT Chest Pulmonary Embolism w Contrast    Other chronic pain  Patient is on oxycodone and duloxetine should be added      PAVAN (generalized anxiety disorder)  For anxiety we will use hydroxyzine which may be nonsedating  - hydrOXYzine (ATARAX) 10 MG tablet  Dispense: 90 tablet; Refill: 3  - DULoxetine (CYMBALTA) 20 MG capsule  Dispense: 180 capsule; Refill: 3    Situational depression  As above  - DULoxetine (CYMBALTA) 20 MG capsule  Dispense: 180 capsule; Refill: 3                   Return in about 6 weeks (around 1/3/2023).    Yaneli Dozier MD  [facility]   Harrison is a 77 year old, presenting for the following health issues:  Follow Up (O2 sat with Oxygen 90-91, without oxygen 70's)      HPI     77-year-old male with known COPD, RUL nodule s/p non-diagnostic VATS and empiric radiation, metastatic papillary thyroid cancer who presented with worsening  shortness of breath and 2 falls at home  This is a follow-up from last visits  Patient was hospitalized in October and was seen in our office Last week I saw him for more shortness of breath  He also has chills and does not feel well and is losing weight and losing interest in life and getting more anxious  Duloxetine was given which his insurance did not cover  He also has chills at times and poor appetite  He feels more anxious  Prednisone and antibiotic made no difference in his shortness of breath  Review of Systems   10 point review negative on ROS        Objective    Vitals - Patient Reported  SpO2 (Patient Reported): 91 (with oxygen)      Vitals:  No vitals were obtained today due to virtual visit.    Physical Exam   GENERAL: Healthy, alert and no distress  EYES: Eyes grossly normal to inspection.  No discharge or erythema, or obvious scleral/conjunctival abnormalities.  RESP: No audible wheeze, cough, or visible cyanosis.  No visible retractions or increased work of breathing.    SKIN: Visible skin clear. No significant rash, abnormal pigmentation or lesions.  NEURO: Cranial nerves grossly intact.  Mentation and speech appropriate for age.  PSYCH: Mentation appears normal, affect normal/bright, judgement and insight intact, normal speech and appearance well-groomed.    Patient Active Problem List   Diagnosis     Essential hypertension, benign     Pain in joint, upper arm     Allergic rhinitis     Pain in joint, lower leg     Chronic airway obstruction (H)     Monoclonal paraproteinemia     Immunodeficiency (H)     Eczema     COPD (chronic obstructive pulmonary disease) (H)     Transient cerebral ischemia     Bunion     Occipital neuralgia     HYPERLIPIDEMIA LDL GOAL <100     Health Care Home     Low back pain     Advanced directives, counseling/discussion     Olecranon bursitis of right elbow     Bruit     Retina hole     signed & scanned on 09/23/2011  (1-4-2013 printed but not scanned in)      Chronic,  continuous use of opioids     Atherosclerosis of native coronary artery of native heart without angina pectoris     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, unspecified thyrotoxicosis type     Right-sided low back pain with right-sided sciatica     SOB (shortness of breath)     Coronary artery disease involving native coronary artery of native heart without angina pectoris     BRENNEN (obstructive sleep apnea)     BPH (benign prostatic hyperplasia)     Rheumatoid arthritis (H)     Hammer toe of right foot     Hallux limitus of right foot     Corn of toe     Non-recurrent unilateral inguinal hernia with obstruction without gangrene     Left inguinal hernia     Metastasis from thyroid cancer (H)     Nodule of upper lobe of right lung     Preoperative examination     Current Outpatient Medications   Medication     acetaminophen (TYLENOL) 325 MG tablet     albuterol (ACCUNEB) 1.25 MG/3ML neb solution     albuterol (PROAIR HFA/PROVENTIL HFA/VENTOLIN HFA) 108 (90 Base) MCG/ACT inhaler     amLODIPine (NORVASC) 5 MG tablet     Ascorbic Acid (VITAMIN C PO)     aspirin 81 MG tablet     calcium carbonate (OS-KARLIE) 1500 (600 Ca) MG tablet     Carisoprodol 250 MG TABS     cholecalciferol 25 MCG (1000 UT) TABS     DULoxetine (CYMBALTA) 20 MG capsule     FISH OIL 1000 MG OR CAPS     Fluticasone-Umeclidin-Vilanterol (TRELEGY ELLIPTA) 100-62.5-25 MCG/INH oral inhaler     furosemide (LASIX) 20 MG tablet     guaiFENesin (MUCINEX) 600 MG 12 hr tablet     hydrOXYzine (ATARAX) 10 MG tablet     levothyroxine (SYNTHROID/LEVOTHROID) 137 MCG tablet     Lidocaine (LIDOCARE) 4 % Patch     lisinopril (ZESTRIL) 40 MG tablet     metoprolol succinate ER (TOPROL XL) 50 MG 24 hr tablet     multivitamin w/minerals (THERA-VIT-M) tablet     naloxone (NARCAN) 4 MG/0.1ML nasal spray     nitroGLYcerin (NITROSTAT) 0.4 MG sublingual tablet     oxyCODONE (ROXICODONE) 5 MG tablet     predniSONE (DELTASONE) 10 MG tablet     rosuvastatin (CRESTOR) 10 MG tablet      senna-docusate (SENOKOT-S/PERICOLACE) 8.6-50 MG tablet     sildenafil (VIAGRA) 100 MG tablet     No current facility-administered medications for this visit.                 Video-Visit Details    Video Start Time: 12.45    Type of service:  Video Visit    Video End Time:1.08    Originating Location (pt. Location): Home    Distant Location (provider location):  On-site    Platform used for Video Visit: Nova

## 2022-11-23 ENCOUNTER — LAB (OUTPATIENT)
Dept: LAB | Facility: CLINIC | Age: 77
End: 2022-11-23
Payer: MEDICARE

## 2022-11-23 ENCOUNTER — ANCILLARY PROCEDURE (OUTPATIENT)
Dept: CT IMAGING | Facility: CLINIC | Age: 77
End: 2022-11-23
Attending: INTERNAL MEDICINE
Payer: MEDICARE

## 2022-11-23 ENCOUNTER — MYC MEDICAL ADVICE (OUTPATIENT)
Dept: RHEUMATOLOGY | Facility: CLINIC | Age: 77
End: 2022-11-23

## 2022-11-23 DIAGNOSIS — C73 METASTASIS FROM THYROID CANCER (H): ICD-10-CM

## 2022-11-23 DIAGNOSIS — M06.9 FLARE OF RHEUMATOID ARTHRITIS (H): ICD-10-CM

## 2022-11-23 DIAGNOSIS — J44.1 COPD EXACERBATION (H): ICD-10-CM

## 2022-11-23 DIAGNOSIS — C79.9 METASTASIS FROM THYROID CANCER (H): ICD-10-CM

## 2022-11-23 LAB
ALBUMIN SERPL-MCNC: 2.7 G/DL (ref 3.4–5)
ALP SERPL-CCNC: 77 U/L (ref 40–150)
ALT SERPL W P-5'-P-CCNC: 54 U/L (ref 0–70)
ANION GAP SERPL CALCULATED.3IONS-SCNC: 3 MMOL/L (ref 3–14)
AST SERPL W P-5'-P-CCNC: 15 U/L (ref 0–45)
BILIRUB SERPL-MCNC: 0.8 MG/DL (ref 0.2–1.3)
BUN SERPL-MCNC: 30 MG/DL (ref 7–30)
CALCIUM SERPL-MCNC: 9.6 MG/DL (ref 8.5–10.1)
CHLORIDE BLD-SCNC: 102 MMOL/L (ref 94–109)
CO2 SERPL-SCNC: 33 MMOL/L (ref 20–32)
CREAT SERPL-MCNC: 1.17 MG/DL (ref 0.66–1.25)
CRP SERPL-MCNC: 95.2 MG/L (ref 0–8)
ERYTHROCYTE [DISTWIDTH] IN BLOOD BY AUTOMATED COUNT: 14.1 % (ref 10–15)
GFR SERPL CREATININE-BSD FRML MDRD: 65 ML/MIN/1.73M2
GLUCOSE BLD-MCNC: 86 MG/DL (ref 70–99)
HCT VFR BLD AUTO: 42.1 % (ref 40–53)
HGB BLD-MCNC: 13.4 G/DL (ref 13.3–17.7)
MCH RBC QN AUTO: 28.8 PG (ref 26.5–33)
MCHC RBC AUTO-ENTMCNC: 31.8 G/DL (ref 31.5–36.5)
MCV RBC AUTO: 90 FL (ref 78–100)
PLATELET # BLD AUTO: 179 10E3/UL (ref 150–450)
POTASSIUM BLD-SCNC: 4.2 MMOL/L (ref 3.4–5.3)
PROT SERPL-MCNC: 6.7 G/DL (ref 6.8–8.8)
RBC # BLD AUTO: 4.66 10E6/UL (ref 4.4–5.9)
SODIUM SERPL-SCNC: 138 MMOL/L (ref 133–144)
WBC # BLD AUTO: 8.5 10E3/UL (ref 4–11)

## 2022-11-23 PROCEDURE — 255N000002 HC RX 255 OP 636: Performed by: INTERNAL MEDICINE

## 2022-11-23 PROCEDURE — 82040 ASSAY OF SERUM ALBUMIN: CPT

## 2022-11-23 PROCEDURE — 80053 COMPREHEN METABOLIC PANEL: CPT

## 2022-11-23 PROCEDURE — 86140 C-REACTIVE PROTEIN: CPT

## 2022-11-23 PROCEDURE — 36415 COLL VENOUS BLD VENIPUNCTURE: CPT

## 2022-11-23 PROCEDURE — G1010 CDSM STANSON: HCPCS

## 2022-11-23 PROCEDURE — 85027 COMPLETE CBC AUTOMATED: CPT

## 2022-11-23 RX ADMIN — IOHEXOL 80 ML: 350 INJECTION, SOLUTION INTRAVENOUS at 08:25

## 2022-11-29 NOTE — TELEPHONE ENCOUNTER
Taylor: please call Harrison Thomas to see if he'd like to be seen tomorrow.    Niles Cedillo MD  11/29/2022

## 2022-11-30 ENCOUNTER — TELEPHONE (OUTPATIENT)
Dept: PULMONOLOGY | Facility: CLINIC | Age: 77
End: 2022-11-30

## 2022-11-30 ENCOUNTER — OFFICE VISIT (OUTPATIENT)
Dept: RHEUMATOLOGY | Facility: CLINIC | Age: 77
End: 2022-11-30
Payer: MEDICARE

## 2022-11-30 ENCOUNTER — TELEPHONE (OUTPATIENT)
Dept: FAMILY MEDICINE | Facility: CLINIC | Age: 77
End: 2022-11-30

## 2022-11-30 VITALS
BODY MASS INDEX: 25.34 KG/M2 | SYSTOLIC BLOOD PRESSURE: 92 MMHG | WEIGHT: 171.6 LBS | HEART RATE: 63 BPM | DIASTOLIC BLOOD PRESSURE: 50 MMHG | OXYGEN SATURATION: 92 %

## 2022-11-30 DIAGNOSIS — M85.80 OSTEOPENIA, UNSPECIFIED LOCATION: ICD-10-CM

## 2022-11-30 DIAGNOSIS — R79.82 CRP ELEVATED: ICD-10-CM

## 2022-11-30 DIAGNOSIS — M05.79 RHEUMATOID ARTHRITIS INVOLVING MULTIPLE SITES WITH POSITIVE RHEUMATOID FACTOR (H): Primary | ICD-10-CM

## 2022-11-30 PROCEDURE — 99214 OFFICE O/P EST MOD 30 MIN: CPT | Performed by: INTERNAL MEDICINE

## 2022-11-30 ASSESSMENT — ENCOUNTER SYMPTOMS
COUGH: 0
PARALYSIS: 0
HYPOTENSION: 1
BRUISES/BLEEDS EASILY: 1
SPUTUM PRODUCTION: 1
SKIN CHANGES: 0
SHORTNESS OF BREATH: 1
CHILLS: 1
FEVER: 0
LOSS OF CONSCIOUSNESS: 1
DECREASED APPETITE: 0
INCREASED ENERGY: 1
POLYPHAGIA: 0
POOR WOUND HEALING: 0
ORTHOPNEA: 1
LEG PAIN: 1
DOUBLE VISION: 0
NIGHT SWEATS: 0
INSOMNIA: 1
WEIGHT GAIN: 0
EYE WATERING: 1
WEIGHT LOSS: 0
EYE IRRITATION: 0
LIGHT-HEADEDNESS: 1
HEMOPTYSIS: 0
POLYDIPSIA: 0
HEADACHES: 0
MUSCLE WEAKNESS: 1
SNORES LOUDLY: 1
POSTURAL DYSPNEA: 1
SYNCOPE: 1
ALTERED TEMPERATURE REGULATION: 1
JOINT SWELLING: 1
DYSPNEA ON EXERTION: 1
PALPITATIONS: 1
DIZZINESS: 1
NAIL CHANGES: 0
TREMORS: 0
DEPRESSION: 1
EYE PAIN: 0
MYALGIAS: 1
ARTHRALGIAS: 1
NERVOUS/ANXIOUS: 1
SWOLLEN GLANDS: 0
WEAKNESS: 1
HALLUCINATIONS: 0
SPEECH CHANGE: 0
EYE REDNESS: 0
DISTURBANCES IN COORDINATION: 1
HYPERTENSION: 1
COUGH DISTURBING SLEEP: 0
NUMBNESS: 1
MEMORY LOSS: 0
NECK PAIN: 1
TINGLING: 0
BACK PAIN: 1
SLEEP DISTURBANCES DUE TO BREATHING: 1
SEIZURES: 0
DECREASED CONCENTRATION: 1
FATIGUE: 1

## 2022-11-30 NOTE — PATIENT INSTRUCTIONS
RHEUMATOLOGY    Dr. Niles Cedillo    [facility]  [address] [place] [address]  [place] [address]  Phone number: [phone]  Fax number: [phone]    You may schedule your FLU shot by calling [phone] or if you would like to get your shot at a [place] pharmacy you may schedule online at [email or web address].    Thank you for choosing [facility]!    Kinjal Quinn CMA Rheumatology

## 2022-11-30 NOTE — TELEPHONE ENCOUNTER
[facility] Call Center    Phone Message    May a detailed message be left on voicemail: yes     Reason for Call: Other: Per pt would like to know if he can be seens asap. Per pt claims his oxygen goes down to 70 if he doesnt keep the his oxygen on. Please call pt back to discuss. Thank you!     Action Taken: Message routed to:  [facility] PULM    Travel Screening: Not Applicable

## 2022-11-30 NOTE — Clinical Note
Arthritis is okay at this time.  I think the CRP elevation is likely related to the pulmonary process.  I have asked him to follow-up with his pulmonologist, and possibly oncology too. .

## 2022-11-30 NOTE — NURSING NOTE
RAPID3 (0-30) Cumulative Score  22.5          RAPID3 Weighted Score (divide #4 by 3 and that is the weighted score)  7.5
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
## 2022-11-30 NOTE — TELEPHONE ENCOUNTER
Jorge BRADFORD from [facility] called reporting-     Pt had a fall this morning. Fall occurred around 0500. Pt was on the toilet. Pt reported he doesn't know/remeber how he fell off the toilet. Pt reported he wasn't having a BM. When pts wife got to pt, he was alert and orientated. Pts wife was able to help pt up. Pts wife was able to clean up wounds and cover them. Pt refused to go in to be seen in the ED.          Swelling above left eye socket (upper lateral eye socket of L eye). No much bruising. Pt has skin tears now.       Superficial skin tears on Left side- 1 above elbow and 1 below elbow. 3 on the back of his hand (1 at the wrist. 1 on pinky at the start of the finger and 1 towards the Knuckle of finger of his pinky)      Pt was A&O when PT was there. No pain while sitting still.       BP 86/60   Other VS today were normal today.   Pt is eating and drinking normally.   PT didn't feel a strong a need for pt to go into ED.     No actual PT was done today due to pt falling this AM and safety was discussed (being hydrated, sit instead of walking).     CRP was in the high 90s so pt is seeing Rheumatology today.     Home care nurse is going out tomorrow to see pt.     Jorge BRADFORD is requesting-     -Wound care.     -Plus extension of PT.   2 more visit- 1 every other week for 3 weeks.    For mobility as able and home safety.        Pt is on supplemental 02, severe SOB with minimal activity. Pt is on cont 3L of 02 and is at about 93%.        Wife told Jorge BRADFORD that an additional medication has been added to med list- duloxetine 20 mg 2x/day. Confirmed with Jorge BRADFORD that this is what we have on our med list. Jorge stated there needs a verbal from the provider that this is correct.      Routing to PCP for recommendations. Further eval? Approve HC orders for wound care and extension of PT?     Please call Jorge BRADFORD back with PCPs recommendations.     Can we leave a detailed message on this number? YES  Phone number patient can  be reached at: [phone]     Cesilia Nunes RN  [facility] Triage

## 2022-11-30 NOTE — TELEPHONE ENCOUNTER
Called [facility] PT back, left a detailed message with provider response below and asking that he call back if any further questions/concerns    Zaria HAYS, Triage RN  [facility]

## 2022-11-30 NOTE — TELEPHONE ENCOUNTER
Writer returned patient call. Patient stated he is feeling short of breath. He is on 3L of continuous oxygen with saturations at 91%. When he gets up to move around his saturations fall to the low 80s. No other symptoms. Patient wants to be seen by Dr. Hadny. Writer told patient if saturations are below 90% he should go in to the ED to be evaluated. Patient said he does not want to sit around the ED for 8 hours. Writer said there is an appointment in clinic with Dr. Christie on Friday 12/2 with Dr. Christie at 1:00pm. Patient said he would like to take that appointment to be seen. Writer contacted clinic navigator to confirm appointment for 1pm on Friday with a 12:45 arrival time.

## 2022-12-02 ENCOUNTER — LAB (OUTPATIENT)
Dept: LAB | Facility: CLINIC | Age: 77
End: 2022-12-02
Payer: MEDICARE

## 2022-12-02 ENCOUNTER — VIRTUAL VISIT (OUTPATIENT)
Dept: FAMILY MEDICINE | Facility: CLINIC | Age: 77
End: 2022-12-02
Payer: MEDICARE

## 2022-12-02 ENCOUNTER — OFFICE VISIT (OUTPATIENT)
Dept: PULMONOLOGY | Facility: CLINIC | Age: 77
End: 2022-12-02
Attending: INTERNAL MEDICINE
Payer: MEDICARE

## 2022-12-02 VITALS — HEART RATE: 57 BPM | SYSTOLIC BLOOD PRESSURE: 118 MMHG | DIASTOLIC BLOOD PRESSURE: 63 MMHG | OXYGEN SATURATION: 91 %

## 2022-12-02 DIAGNOSIS — R79.82 ELEVATED C-REACTIVE PROTEIN (CRP): ICD-10-CM

## 2022-12-02 DIAGNOSIS — R09.02 HYPOXIA: ICD-10-CM

## 2022-12-02 DIAGNOSIS — R06.00 DYSPNEA, UNSPECIFIED TYPE: ICD-10-CM

## 2022-12-02 DIAGNOSIS — J18.9 PNEUMONIA DUE TO INFECTIOUS ORGANISM, UNSPECIFIED LATERALITY, UNSPECIFIED PART OF LUNG: ICD-10-CM

## 2022-12-02 DIAGNOSIS — J18.9 PNEUMONIA DUE TO INFECTIOUS ORGANISM, UNSPECIFIED LATERALITY, UNSPECIFIED PART OF LUNG: Primary | ICD-10-CM

## 2022-12-02 DIAGNOSIS — I10 ESSENTIAL (PRIMARY) HYPERTENSION: Primary | ICD-10-CM
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
## 2022-12-02 PROCEDURE — G0463 HOSPITAL OUTPT CLINIC VISIT: HCPCS

## 2022-12-02 PROCEDURE — 99213 OFFICE O/P EST LOW 20 MIN: CPT | Mod: 95 | Performed by: PHYSICIAN ASSISTANT

## 2022-12-02 PROCEDURE — 99214 OFFICE O/P EST MOD 30 MIN: CPT | Mod: GC | Performed by: STUDENT IN AN ORGANIZED HEALTH CARE EDUCATION/TRAINING PROGRAM

## 2022-12-02 RX ORDER — CEFUROXIME AXETIL 500 MG/1
500 TABLET ORAL 2 TIMES DAILY
Qty: 14 TABLET | Refills: 0 | Status: SHIPPED | OUTPATIENT
Start: 2022-12-02 | End: 2023-01-09
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
## 2022-12-02 NOTE — NURSING NOTE
Chief Complaint   Patient presents with     Follow Up     Follow up appt      Vitals were taken and medications were reconciled.     Arina Sesay RMA  12:56 PM
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
## 2022-12-02 NOTE — PATIENT INSTRUCTIONS
1) Please present to lab for your blood, urine, and swab testing working up for possible infection.  2) Please take the prescribed antibiotic for 7 days.  3) Please present back to the Pulmonary Clinic to see Dr. Handy as currently scheduled on 12/30/2022 (you would get a CT scan and pulmonary function tests that day prior to said appointment) to evaluate for improvement or worsening after treatment for possible pneumonia.  4) If you have worsening shortness of breath or oxygen requirements at home then please present to ED for further evaluation.

## 2022-12-03 ENCOUNTER — LAB (OUTPATIENT)
Dept: LAB | Facility: CLINIC | Age: 77
End: 2022-12-03
Payer: MEDICARE

## 2022-12-03 DIAGNOSIS — E89.0 POSTSURGICAL HYPOTHYROIDISM: ICD-10-CM

## 2022-12-03 DIAGNOSIS — Z12.5 SCREENING FOR PROSTATE CANCER: ICD-10-CM

## 2022-12-03 DIAGNOSIS — R09.02 HYPOXIA: ICD-10-CM

## 2022-12-03 DIAGNOSIS — J18.9 PNEUMONIA DUE TO INFECTIOUS ORGANISM, UNSPECIFIED LATERALITY, UNSPECIFIED PART OF LUNG: ICD-10-CM

## 2022-12-03 DIAGNOSIS — C73 PAPILLARY THYROID CARCINOMA (H): ICD-10-CM

## 2022-12-03 DIAGNOSIS — N40.0 BPH (BENIGN PROSTATIC HYPERPLASIA): ICD-10-CM

## 2022-12-03 DIAGNOSIS — R06.00 DYSPNEA, UNSPECIFIED TYPE: ICD-10-CM

## 2022-12-03 DIAGNOSIS — R79.82 ELEVATED C-REACTIVE PROTEIN (CRP): ICD-10-CM

## 2022-12-03 LAB
BASOPHILS # BLD AUTO: 0.1 10E3/UL (ref 0–0.2)
BASOPHILS NFR BLD AUTO: 1 %
CALCIUM SERPL-MCNC: 9.2 MG/DL (ref 8.8–10.2)
CRP SERPL-MCNC: 26.8 MG/L
EOSINOPHIL # BLD AUTO: 0.1 10E3/UL (ref 0–0.7)
EOSINOPHIL NFR BLD AUTO: 1 %
ERYTHROCYTE [DISTWIDTH] IN BLOOD BY AUTOMATED COUNT: 14.3 % (ref 10–15)
ERYTHROCYTE [SEDIMENTATION RATE] IN BLOOD BY WESTERGREN METHOD: 24 MM/HR (ref 0–20)
HCT VFR BLD AUTO: 38.7 % (ref 40–53)
HGB BLD-MCNC: 12.5 G/DL (ref 13.3–17.7)
IMM GRANULOCYTES # BLD: 0.4 10E3/UL
IMM GRANULOCYTES NFR BLD: 4 %
L PNEUMO1 AG UR QL IA: NEGATIVE
LDH SERPL L TO P-CCNC: 277 U/L (ref 0–250)
LYMPHOCYTES # BLD AUTO: 0.5 10E3/UL (ref 0.8–5.3)
LYMPHOCYTES NFR BLD AUTO: 6 %
MCH RBC QN AUTO: 28.4 PG (ref 26.5–33)
MCHC RBC AUTO-ENTMCNC: 32.3 G/DL (ref 31.5–36.5)
MCV RBC AUTO: 88 FL (ref 78–100)
MONOCYTES # BLD AUTO: 0.7 10E3/UL (ref 0–1.3)
MONOCYTES NFR BLD AUTO: 8 %
NEUTROPHILS # BLD AUTO: 6.3 10E3/UL (ref 1.6–8.3)
NEUTROPHILS NFR BLD AUTO: 80 %
NRBC # BLD AUTO: 0 10E3/UL
NRBC BLD AUTO-RTO: 0 /100
PLATELET # BLD AUTO: 156 10E3/UL (ref 150–450)
PROCALCITONIN SERPL IA-MCNC: 0.06 NG/ML
PSA SERPL-MCNC: 0.48 NG/ML (ref 0–6.5)
RBC # BLD AUTO: 4.4 10E6/UL (ref 4.4–5.9)
S PNEUM AG SPEC QL: NEGATIVE
T4 FREE SERPL-MCNC: 2.06 NG/DL (ref 0.9–1.7)
TSH SERPL DL<=0.005 MIU/L-ACNC: 4.3 UIU/ML (ref 0.3–4.2)
WBC # BLD AUTO: 7.9 10E3/UL (ref 4–11)

## 2022-12-03 PROCEDURE — 87449 NOS EACH ORGANISM AG IA: CPT | Mod: 90 | Performed by: PATHOLOGY

## 2022-12-03 PROCEDURE — 84145 PROCALCITONIN (PCT): CPT | Performed by: STUDENT IN AN ORGANIZED HEALTH CARE EDUCATION/TRAINING PROGRAM

## 2022-12-03 PROCEDURE — 84432 ASSAY OF THYROGLOBULIN: CPT | Performed by: INTERNAL MEDICINE

## 2022-12-03 PROCEDURE — 36415 COLL VENOUS BLD VENIPUNCTURE: CPT | Performed by: PATHOLOGY

## 2022-12-03 PROCEDURE — 86800 THYROGLOBULIN ANTIBODY: CPT | Performed by: INTERNAL MEDICINE

## 2022-12-03 PROCEDURE — 84443 ASSAY THYROID STIM HORMONE: CPT | Performed by: PATHOLOGY

## 2022-12-03 PROCEDURE — 87205 SMEAR GRAM STAIN: CPT | Performed by: STUDENT IN AN ORGANIZED HEALTH CARE EDUCATION/TRAINING PROGRAM

## 2022-12-03 PROCEDURE — 84439 ASSAY OF FREE THYROXINE: CPT | Performed by: PATHOLOGY

## 2022-12-03 PROCEDURE — 87899 AGENT NOS ASSAY W/OPTIC: CPT | Performed by: STUDENT IN AN ORGANIZED HEALTH CARE EDUCATION/TRAINING PROGRAM

## 2022-12-03 PROCEDURE — 99000 SPECIMEN HANDLING OFFICE-LAB: CPT | Performed by: PATHOLOGY

## 2022-12-03 PROCEDURE — 86698 HISTOPLASMA ANTIBODY: CPT | Mod: 90 | Performed by: PATHOLOGY

## 2022-12-03 PROCEDURE — 87305 ASPERGILLUS AG IA: CPT | Mod: 90 | Performed by: PATHOLOGY

## 2022-12-03 PROCEDURE — G0103 PSA SCREENING: HCPCS | Performed by: PATHOLOGY

## 2022-12-03 PROCEDURE — 83615 LACTATE (LD) (LDH) ENZYME: CPT | Performed by: STUDENT IN AN ORGANIZED HEALTH CARE EDUCATION/TRAINING PROGRAM

## 2022-12-03 PROCEDURE — 87449 NOS EACH ORGANISM AG IA: CPT | Performed by: STUDENT IN AN ORGANIZED HEALTH CARE EDUCATION/TRAINING PROGRAM

## 2022-12-03 PROCEDURE — 82310 ASSAY OF CALCIUM: CPT | Performed by: PATHOLOGY

## 2022-12-03 PROCEDURE — 85025 COMPLETE CBC W/AUTO DIFF WBC: CPT | Performed by: PATHOLOGY

## 2022-12-03 PROCEDURE — 86140 C-REACTIVE PROTEIN: CPT | Performed by: PATHOLOGY

## 2022-12-03 PROCEDURE — 85652 RBC SED RATE AUTOMATED: CPT | Performed by: PATHOLOGY

## 2022-12-03 PROCEDURE — 87899 AGENT NOS ASSAY W/OPTIC: CPT | Mod: 59 | Performed by: STUDENT IN AN ORGANIZED HEALTH CARE EDUCATION/TRAINING PROGRAM

## 2022-12-04 ENCOUNTER — TELEPHONE (OUTPATIENT)
Dept: NURSING | Facility: CLINIC | Age: 77
End: 2022-12-04

## 2022-12-04 NOTE — TELEPHONE ENCOUNTER
Consent to communicate on file for wife.  Patient is not with the wife.    Patient coughed up phlegm for a lab test and wife needs to know if she can take it to another [facility] lab if her lab is closed.    Wife informed that she can take it to any [facility] lab.    Mouna Melissa RN on 12/4/2022 at 12:21 PM

## 2022-12-06 LAB
BACTERIA SPT CULT: NORMAL
GRAM STAIN RESULT: NORMAL

## 2022-12-07 LAB
1,3 BETA GLUCAN SER-MCNC: 68 PG/ML
GALACTOMANNAN AG SERPL QL IA: NEGATIVE
GALACTOMANNAN AG SPEC IA-ACNC: 0.05
H CAPSUL AG SER IA-ACNC: NOT DETECTED
H CAPSUL AG SER QL IA: NOT DETECTED
OBSERVATION IMP: ABNORMAL
SCANNED LAB RESULT: NORMAL

## 2022-12-08 ENCOUNTER — MEDICAL CORRESPONDENCE (OUTPATIENT)
Dept: HEALTH INFORMATION MANAGEMENT | Facility: CLINIC | Age: 77
End: 2022-12-08

## 2022-12-12 ENCOUNTER — MEDICAL CORRESPONDENCE (OUTPATIENT)
Dept: HEALTH INFORMATION MANAGEMENT | Facility: CLINIC | Age: 77
End: 2022-12-12

## 2022-12-12 ENCOUNTER — TELEPHONE (OUTPATIENT)
Dept: FAMILY MEDICINE | Facility: CLINIC | Age: 77
End: 2022-12-12

## 2022-12-12 DIAGNOSIS — M25.562 ARTHRALGIA OF LEFT LOWER LEG: ICD-10-CM

## 2022-12-12 NOTE — TELEPHONE ENCOUNTER
Medication Question or Refill    Contacts       Type Contact Phone/Fax    12/12/2022 09:02 AM CST Phone (Incoming) Harrison Thomas (Self) [phone] (M)          What medication are you calling about (include dose and sig)?: oxyCODONE (ROXICODONE) 5 MG tablet    Controlled Substance Agreement on file:   CSA -- Patient Level:     [Media Unavailable] Controlled Substance Agreement - Opioid - Scan on 11/1/2021  1:44 PM: 11/1/21       Who prescribed the medication?: Tasia    Do you need a refill? Yes:     When did you use the medication last? today    Patient offered an appointment? No    Do you have any questions or concerns?  No    Preferred Pharmacy:  [facility]/pharmacy #[id number] - [place], [place] - [address] [place] [id number]  Phone: [phone] Fax: [phone]    Heydi Chanel/Tamanna-  [facility]

## 2022-12-14 RX ORDER — OXYCODONE HYDROCHLORIDE 5 MG/1
5 TABLET ORAL 3 TIMES DAILY PRN
Qty: 90 TABLET | Refills: 0 | Status: ON HOLD | OUTPATIENT
Start: 2022-12-14 | End: 2023-01-22

## 2022-12-15 ENCOUNTER — TELEPHONE (OUTPATIENT)
Dept: FAMILY MEDICINE | Facility: CLINIC | Age: 77
End: 2022-12-15

## 2022-12-15 NOTE — TELEPHONE ENCOUNTER
Viki (Community Medical Liaison) from [facility] called asking the order for HC PT be signed and faxed back to them. Viki stated the order was faxed to pcps office on 11/30.     Routing to PCP and team.     Viki #-  [phone] ext [phone] (can leave a detailed message)

## 2022-12-16 ENCOUNTER — NURSE TRIAGE (OUTPATIENT)
Dept: FAMILY MEDICINE | Facility: CLINIC | Age: 77
End: 2022-12-16

## 2022-12-16 ENCOUNTER — TELEPHONE (OUTPATIENT)
Dept: PULMONOLOGY | Facility: CLINIC | Age: 77
End: 2022-12-16

## 2022-12-16 DIAGNOSIS — I10 ESSENTIAL HYPERTENSION, BENIGN: ICD-10-CM

## 2022-12-16 DIAGNOSIS — I10 BENIGN ESSENTIAL HYPERTENSION: ICD-10-CM

## 2022-12-16 DIAGNOSIS — J44.1 COPD EXACERBATION (H): ICD-10-CM

## 2022-12-16 DIAGNOSIS — I10 ESSENTIAL (PRIMARY) HYPERTENSION: ICD-10-CM

## 2022-12-16 RX ORDER — ALBUTEROL SULFATE 1.25 MG/3ML
1.25 SOLUTION RESPIRATORY (INHALATION) EVERY 4 HOURS PRN
Qty: 90 ML | Refills: 4 | Status: SHIPPED | OUTPATIENT
Start: 2022-12-16

## 2022-12-16 RX ORDER — FUROSEMIDE 20 MG
20 TABLET ORAL 2 TIMES DAILY
COMMUNITY
Start: 2022-12-16 | End: 2023-06-02

## 2022-12-16 RX ORDER — AMLODIPINE BESYLATE 5 MG/1
5 TABLET ORAL AT BEDTIME
COMMUNITY
Start: 2022-12-16 | End: 2023-12-01

## 2022-12-16 RX ORDER — METOPROLOL SUCCINATE 50 MG/1
50 TABLET, EXTENDED RELEASE ORAL DAILY
COMMUNITY
Start: 2022-12-16 | End: 2023-03-20

## 2022-12-16 NOTE — TELEPHONE ENCOUNTER
TO PCP:     Home Care Physical Therapy Assistant ([facility]) Lester called to report vitals:     BP is 96/53 - taken twice - HR was 56    Feels alright - all other vitals normal. No symptoms currently     BP meds (did med rec as what pt taking did not match med list)     Amlodipine 5mg - recently changed to ONCE per day (updated med list) in AM   Lasix 20mg - takes in AM and at noon (updated med list to reflect current dosing)   Lisinopril 40mg - once per day in PM (has not yet taken today)   Metoprolol 50mg - once in the AM (updated med list to reflect current dosing)     Is staying hydrated     Every once in a while feels lightheaded with standing, not as bad as it was previously. 3-4 times per day gets dizziness     Home care had called a few weeks ago reporting lower blood pressure readings and dizziness, his amlodipine was cut back to once per day. Recently did a VV with Jerry for his low BP and fall. He is better but blood pressures are still running low.     Please advise   1) should patient take his Lisinopril tonight?   2) does pt need to cut down on any of his BP meds further/do you want pt to do a visit with you for this?     Call Harrison and Lester both back     Lester [phone] (detailed message okay)     Zaria HAYS, Triage RN  [facility]       Reason for Disposition    Systolic BP  while taking blood pressure medications and NOT dizzy, lightheaded or weak    Additional Information    Negative: Systolic BP < 90 and feeling dizzy, lightheaded, or weak    Negative: Started suddenly after an allergic medicine, an allergic food, or bee sting    Negative: Shock suspected (e.g., cold/pale/clammy skin, too weak to stand, low BP, rapid pulse)    Negative: Difficult to awaken or acting confused (e.g., disoriented, slurred speech)    Negative: Fainted    Negative: Chest pain    Negative: Bleeding (e.g., vomiting blood, rectal bleeding or tarry stools, severe vaginal  bleeding)    Negative: Extra heart beats or heart is beating fast (i.e., 'palpitations')    Negative: Sounds like a life-threatening emergency to the triager    Negative: Systolic BP < 80 and NOT dizzy, lightheaded or weak (feels normal)    Negative: Abdominal pain    Negative: Major surgery in the past month    Negative: Fever > 100.4 F (38.0 C)    Negative: Drinking very little and has signs of dehydration (e.g., no urine > 12 hours, very dry mouth, very lightheaded)    Negative: Fall in systolic BP > 20 mm Hg from normal and feeling dizzy, lightheaded, or weak    Negative: Patient sounds very sick or weak to the triager    Negative: Systolic BP < 90 and NOT dizzy, lightheaded or weak    Protocols used: BLOOD PRESSURE - LOW-A-OH

## 2022-12-16 NOTE — TELEPHONE ENCOUNTER
Refilled Albuterol Nebulization. Patient had stated it had to be approved. Called pharmacy and no PA needed. Just needed a refill.

## 2022-12-19 NOTE — TELEPHONE ENCOUNTER
"    Call to patient. Patient informed of Dr. Dozier's order to stop taking Lisinopril. Patient verbalized understanding. Patient reports he feels \"Pretty much the same.\"     Call to Lester with [facility]. Detailed message left with Dr. Dozier's order to stop Lisinopril and that patient already informed of this.       Provider, please advise if any further medication adjustments need to be made or if/when a follow-up appointment would be needed? Patient had a VV on 12/2/2022 with Jerry Kimbrough; however, this is an ongoing concern.     Thank you!      Lauren Lezama, RN BSN MSN  [facility]    "

## 2022-12-19 NOTE — TELEPHONE ENCOUNTER
Call to Lester. Detailed message left with Dr. Dozier's above response.     Call to patient. Patient informed of Dr. Dozier's above response.   Patient requesting to schedule a follow-up with Dr. Dozier.     Appointment scheduled.     Appointments in Next Year       Jan 23, 2023 10:30 AM  (Arrive by 10:10 AM)  Provider Visit with Yaneli Dozier MD  [facility] ([facility] ) [phone]     This RN also advised patient to reach out to clinic with any other questions or concerns. Advised patient to inform clinic of any symptoms or continued low blood pressure with stopping the Lisinopril. Advised patient to take his time changing positions from [id number]-to-standing. Patient verbalized understanding.       Lauren Lezama RN BSN MSN  [facility]

## 2022-12-20 ENCOUNTER — MEDICAL CORRESPONDENCE (OUTPATIENT)
Dept: HEALTH INFORMATION MANAGEMENT | Facility: CLINIC | Age: 77
End: 2022-12-20

## 2022-12-20 ENCOUNTER — TELEPHONE (OUTPATIENT)
Dept: FAMILY MEDICINE | Facility: CLINIC | Age: 77
End: 2022-12-20

## 2022-12-20 LAB — SCANNED LAB RESULT: NORMAL

## 2022-12-20 NOTE — TELEPHONE ENCOUNTER
Received call from ROGERS Pires from home care called requesting verbal orders for continuation of skilled nursing. Frequency: every other week for 8 weeks and 3 additional visits as needed. Reason: COPD medication management.    Verbal approval given.     Requestor verified that orders would be faxed to provider for signature.     Lexis RN also wanting to notify PCP that patients HR during visit was 56 beats per minutes. Patient reported no symptoms.     Routing to PCP for review.    Mendez Muniz RN

## 2022-12-21 ENCOUNTER — TELEPHONE (OUTPATIENT)
Dept: PULMONOLOGY | Facility: CLINIC | Age: 77
End: 2022-12-21

## 2022-12-21 DIAGNOSIS — J44.9 COPD (CHRONIC OBSTRUCTIVE PULMONARY DISEASE) (H): Primary | ICD-10-CM

## 2022-12-21 NOTE — TELEPHONE ENCOUNTER
Patient my charted questions about labs done with Dr Christie apt. Dr Handy authorized repeat labs to be done before apt with DR Handy next week. Placed orders for CBC with dif, CRP and BMP and sent message to Enriqueta to fax lab orders to his home health nurse to be done before next week apt.

## 2022-12-22 DIAGNOSIS — E89.0 POSTSURGICAL HYPOTHYROIDISM: Primary | ICD-10-CM

## 2022-12-22 DIAGNOSIS — E89.0 POSTSURGICAL HYPOTHYROIDISM: ICD-10-CM

## 2022-12-22 RX ORDER — LEVOTHYROXINE SODIUM 137 UG/1
137 TABLET ORAL DAILY
Qty: 90 TABLET | Refills: 1 | Status: SHIPPED | OUTPATIENT
Start: 2022-12-22 | End: 2023-03-05

## 2022-12-22 NOTE — TELEPHONE ENCOUNTER
Spoke with patient and wife, Adeola, regarding Dr. Handy's request for labs to be completed prior to visit with Dr. Handy on 12/30. They would like this to be completed by Lexis, home health nurse, from [facility]. They provided fax number and Lexis's number. Spoke with Lexis [phone] and she is aware that orders will be faxed (CBC with Diff, CRP and BMP) to be drawn whenever as long as results received by 12/30 appt. Fax cover sheet, CBC with diff order, CRP order, BMP order and demographic sheet faxed to Lexis at [facility] [phone]

## 2022-12-23 ENCOUNTER — MEDICAL CORRESPONDENCE (OUTPATIENT)
Dept: HEALTH INFORMATION MANAGEMENT | Facility: CLINIC | Age: 77
End: 2022-12-23

## 2022-12-23 ENCOUNTER — TELEPHONE (OUTPATIENT)
Dept: FAMILY MEDICINE | Facility: CLINIC | Age: 77
End: 2022-12-23

## 2022-12-23 ASSESSMENT — ENCOUNTER SYMPTOMS
SPUTUM PRODUCTION: 0
DYSPNEA ON EXERTION: 1
NUMBNESS: 0
DECREASED CONCENTRATION: 0
MEMORY LOSS: 0
LOSS OF CONSCIOUSNESS: 1
JOINT SWELLING: 1
SEIZURES: 0
WEAKNESS: 1
INSOMNIA: 1
DISTURBANCES IN COORDINATION: 1
ARTHRALGIAS: 1
BRUISES/BLEEDS EASILY: 0
WHEEZING: 0
POSTURAL DYSPNEA: 1
HEADACHES: 0
NECK PAIN: 0
NERVOUS/ANXIOUS: 1
DIZZINESS: 1
COUGH DISTURBING SLEEP: 0
TINGLING: 0
SHORTNESS OF BREATH: 1
COUGH: 0
DEPRESSION: 1
MUSCLE WEAKNESS: 1
TREMORS: 0
SNORES LOUDLY: 1
HEMOPTYSIS: 0
SWOLLEN GLANDS: 0
MUSCLE CRAMPS: 0
SPEECH CHANGE: 0
MYALGIAS: 1
STIFFNESS: 0
PARALYSIS: 0
BACK PAIN: 1
PANIC: 1

## 2022-12-23 NOTE — TELEPHONE ENCOUNTER
Jorge from [facility] called requesting approval to continue PT orders 1 x a wk for 4 wks.     Verbal approval given.

## 2022-12-28 ENCOUNTER — LAB REQUISITION (OUTPATIENT)
Dept: LAB | Facility: CLINIC | Age: 77
End: 2022-12-28
Payer: MEDICARE

## 2022-12-28 DIAGNOSIS — I10 ESSENTIAL (PRIMARY) HYPERTENSION: ICD-10-CM

## 2022-12-28 DIAGNOSIS — J44.9 CHRONIC OBSTRUCTIVE PULMONARY DISEASE, UNSPECIFIED (H): ICD-10-CM

## 2022-12-28 LAB
ANION GAP SERPL CALCULATED.3IONS-SCNC: 4 MMOL/L (ref 3–14)
BASOPHILS # BLD AUTO: 0 10E3/UL (ref 0–0.2)
BASOPHILS NFR BLD AUTO: 1 %
BUN SERPL-MCNC: 22 MG/DL (ref 7–30)
CALCIUM SERPL-MCNC: 8.9 MG/DL (ref 8.5–10.1)
CHLORIDE BLD-SCNC: 103 MMOL/L (ref 94–109)
CO2 SERPL-SCNC: 32 MMOL/L (ref 20–32)
CREAT SERPL-MCNC: 0.92 MG/DL (ref 0.66–1.25)
CRP SERPL-MCNC: 8 MG/L (ref 0–8)
EOSINOPHIL # BLD AUTO: 0.3 10E3/UL (ref 0–0.7)
EOSINOPHIL NFR BLD AUTO: 5 %
ERYTHROCYTE [DISTWIDTH] IN BLOOD BY AUTOMATED COUNT: 16.9 % (ref 10–15)
GFR SERPL CREATININE-BSD FRML MDRD: 87 ML/MIN/1.73M2
GLUCOSE BLD-MCNC: 93 MG/DL (ref 70–99)
HCT VFR BLD AUTO: 40.4 % (ref 40–53)
HGB BLD-MCNC: 13 G/DL (ref 13.3–17.7)
HOLD SPECIMEN: NORMAL
IMM GRANULOCYTES # BLD: 0 10E3/UL
IMM GRANULOCYTES NFR BLD: 1 %
LYMPHOCYTES # BLD AUTO: 1 10E3/UL (ref 0.8–5.3)
LYMPHOCYTES NFR BLD AUTO: 16 %
MCH RBC QN AUTO: 29 PG (ref 26.5–33)
MCHC RBC AUTO-ENTMCNC: 32.2 G/DL (ref 31.5–36.5)
MCV RBC AUTO: 90 FL (ref 78–100)
MONOCYTES # BLD AUTO: 0.8 10E3/UL (ref 0–1.3)
MONOCYTES NFR BLD AUTO: 13 %
NEUTROPHILS # BLD AUTO: 4 10E3/UL (ref 1.6–8.3)
NEUTROPHILS NFR BLD AUTO: 64 %
NRBC # BLD AUTO: 0 10E3/UL
NRBC BLD AUTO-RTO: 0 /100
PLATELET # BLD AUTO: 153 10E3/UL (ref 150–450)
POTASSIUM BLD-SCNC: 3.9 MMOL/L (ref 3.4–5.3)
RBC # BLD AUTO: 4.49 10E6/UL (ref 4.4–5.9)
SODIUM SERPL-SCNC: 139 MMOL/L (ref 133–144)
WBC # BLD AUTO: 6.1 10E3/UL (ref 4–11)

## 2022-12-28 PROCEDURE — 85025 COMPLETE CBC W/AUTO DIFF WBC: CPT | Performed by: INTERNAL MEDICINE

## 2022-12-28 PROCEDURE — 86140 C-REACTIVE PROTEIN: CPT | Performed by: INTERNAL MEDICINE

## 2022-12-28 PROCEDURE — 80048 BASIC METABOLIC PNL TOTAL CA: CPT | Performed by: INTERNAL MEDICINE

## 2022-12-30 ENCOUNTER — OFFICE VISIT (OUTPATIENT)
Dept: PULMONOLOGY | Facility: CLINIC | Age: 77
End: 2022-12-30
Attending: INTERNAL MEDICINE
Payer: MEDICARE

## 2022-12-30 ENCOUNTER — ANCILLARY PROCEDURE (OUTPATIENT)
Dept: CT IMAGING | Facility: CLINIC | Age: 77
End: 2022-12-30
Payer: MEDICARE

## 2022-12-30 VITALS
SYSTOLIC BLOOD PRESSURE: 153 MMHG | RESPIRATION RATE: 17 BRPM | OXYGEN SATURATION: 91 % | DIASTOLIC BLOOD PRESSURE: 79 MMHG | HEART RATE: 59 BPM | WEIGHT: 162.9 LBS | HEIGHT: 69 IN | BODY MASS INDEX: 24.13 KG/M2

## 2022-12-30 DIAGNOSIS — R09.02 HYPOXIA: ICD-10-CM

## 2022-12-30 DIAGNOSIS — R93.89 ABNORMAL CHEST CT: ICD-10-CM

## 2022-12-30 DIAGNOSIS — J18.9 PNEUMONIA DUE TO INFECTIOUS ORGANISM, UNSPECIFIED LATERALITY, UNSPECIFIED PART OF LUNG: ICD-10-CM

## 2022-12-30 DIAGNOSIS — J43.8 OTHER EMPHYSEMA (H): Primary | ICD-10-CM

## 2022-12-30 DIAGNOSIS — R06.00 DYSPNEA, UNSPECIFIED TYPE: ICD-10-CM

## 2022-12-30 DIAGNOSIS — R79.82 ELEVATED C-REACTIVE PROTEIN (CRP): ICD-10-CM

## 2022-12-30 DIAGNOSIS — Z85.850 HX OF PAPILLARY THYROID CARCINOMA: ICD-10-CM

## 2022-12-30 DIAGNOSIS — Z53.9 DIAGNOSIS NOT YET DEFINED: Primary | ICD-10-CM

## 2022-12-30 PROCEDURE — 94729 DIFFUSING CAPACITY: CPT | Performed by: INTERNAL MEDICINE

## 2022-12-30 PROCEDURE — 94375 RESPIRATORY FLOW VOLUME LOOP: CPT | Performed by: INTERNAL MEDICINE

## 2022-12-30 PROCEDURE — 99215 OFFICE O/P EST HI 40 MIN: CPT | Mod: 25 | Performed by: INTERNAL MEDICINE

## 2022-12-30 PROCEDURE — G1010 CDSM STANSON: HCPCS | Mod: GC | Performed by: RADIOLOGY

## 2022-12-30 PROCEDURE — G0180 MD CERTIFICATION HHA PATIENT: HCPCS | Performed by: INTERNAL MEDICINE

## 2022-12-30 PROCEDURE — G0463 HOSPITAL OUTPT CLINIC VISIT: HCPCS | Performed by: INTERNAL MEDICINE

## 2022-12-30 PROCEDURE — 71250 CT THORAX DX C-: CPT | Mod: MG | Performed by: RADIOLOGY

## 2022-12-30 ASSESSMENT — PAIN SCALES - GENERAL: PAINLEVEL: NO PAIN (0)
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
## 2022-12-30 NOTE — PATIENT INSTRUCTIONS
Reviewed recent events, results of todays chest CT & pulmonary function tests (PFTs)    Improving shortness of breath; stopped prednisone slow taper yesterday; off home O2 since 12/17/22    CT shows some increase in Right lower lobe densities since 11/2022 - cause uncertain;  in area with abnormality pre-attempted biopsy    Plan:  1)  Continue off prednisone for now  2)  Continue off O2 for now - call us if O2 at rest is < 90% or if it is dropping < 85% with walking  3)  Order placed for pulmonary rehab  4)  Follow up in 3-4 months with repeat PFTs and 6 minute walk test.  5)  Call our nurses for worsening (Brandon / Candie / Cleo) [phone]

## 2022-12-30 NOTE — NURSING NOTE
Chief Complaint   Patient presents with     RECHECK     Return visit COPD    Medications reviewed and vital signs taken.   Carlos Luciano CMA

## 2023-01-01 LAB
DLCOUNC-%PRED-PRE: 53 %
DLCOUNC-PRE: 13.07 ML/MIN/MMHG
DLCOUNC-PRED: 24.37 ML/MIN/MMHG
ERV-%PRED-PRE: 61 %
ERV-PRE: 0.69 L
ERV-PRED: 1.12 L
EXPTIME-PRE: 8.66 SEC
FEF2575-%PRED-PRE: 32 %
FEF2575-PRE: 0.67 L/SEC
FEF2575-PRED: 2.06 L/SEC
FEFMAX-%PRED-PRE: 69 %
FEFMAX-PRE: 5.3 L/SEC
FEFMAX-PRED: 7.61 L/SEC
FEV1-%PRED-PRE: 56 %
FEV1-PRE: 1.55 L
FEV1FEV6-PRE: 61 %
FEV1FEV6-PRED: 77 %
FEV1FVC-PRE: 59 %
FEV1FVC-PRED: 76 %
FEV1SVC-PRE: 59 %
FEV1SVC-PRED: 61 %
FIFMAX-PRE: 2.54 L/SEC
FVC-%PRED-PRE: 72 %
FVC-PRE: 2.63 L
FVC-PRED: 3.62 L
IC-%PRED-PRE: 57 %
IC-PRE: 1.94 L
IC-PRED: 3.35 L
VA-%PRED-PRE: 59 %
VA-PRE: 3.65 L
VC-%PRED-PRE: 58 %
VC-PRE: 2.63 L
VC-PRED: 4.47 L

## 2023-01-04 NOTE — PROGRESS NOTES
[facility]    Outpatient Physical Therapy Discharge Note  Patient: Harrison Thomas  : 1945    Beginning/End Dates of Reporting Period:  2022 to 8/15/2022    Patient cancelled remainder of visits following recent progress note for unknown reason and lost to follow up. Patient made significant progress towards goals and met multiple goals over POC. See most recent progress note for more information on progress. Will discharge at this time and close POC.

## 2023-01-04 NOTE — ADDENDUM NOTE
Encounter addended by: iLta Hubbard, PT on: 1/4/2023 1:40 PM   Actions taken: Clinical Note Signed, Episode resolved

## 2023-01-09 ENCOUNTER — APPOINTMENT (OUTPATIENT)
Dept: CT IMAGING | Facility: CLINIC | Age: 78
DRG: 205 | End: 2023-01-09
Attending: EMERGENCY MEDICINE
Payer: MEDICARE

## 2023-01-09 ENCOUNTER — HOSPITAL ENCOUNTER (INPATIENT)
Facility: CLINIC | Age: 78
LOS: 3 days | Discharge: HOME HEALTH | DRG: 205 | End: 2023-01-14
Attending: EMERGENCY MEDICINE | Admitting: STUDENT IN AN ORGANIZED HEALTH CARE EDUCATION/TRAINING PROGRAM
Payer: MEDICARE

## 2023-01-09 ENCOUNTER — NURSE TRIAGE (OUTPATIENT)
Dept: FAMILY MEDICINE | Facility: CLINIC | Age: 78
End: 2023-01-09

## 2023-01-09 DIAGNOSIS — S22.32XA CLOSED FRACTURE OF ONE RIB OF LEFT SIDE, INITIAL ENCOUNTER: ICD-10-CM

## 2023-01-09 DIAGNOSIS — J44.9 CHRONIC OBSTRUCTIVE PULMONARY DISEASE, UNSPECIFIED COPD TYPE (H): Primary | ICD-10-CM

## 2023-01-09 DIAGNOSIS — J18.9 PNEUMONIA OF RIGHT UPPER LOBE DUE TO INFECTIOUS ORGANISM: ICD-10-CM

## 2023-01-09 DIAGNOSIS — M62.830 BACK MUSCLE SPASM: ICD-10-CM

## 2023-01-09 DIAGNOSIS — J96.21 ACUTE ON CHRONIC RESPIRATORY FAILURE WITH HYPOXIA (H): ICD-10-CM

## 2023-01-09 PROBLEM — R06.02 SOB (SHORTNESS OF BREATH): Status: RESOLVED | Noted: 2018-01-03 | Resolved: 2023-01-09

## 2023-01-09 PROBLEM — C79.9 METASTASIS FROM THYROID CANCER (H): Status: ACTIVE | Noted: 2021-08-04

## 2023-01-09 PROBLEM — S22.39XA CLOSED FRACTURE OF ONE RIB: Status: ACTIVE | Noted: 2023-01-09

## 2023-01-09 PROBLEM — M06.9 RHEUMATOID ARTHRITIS (H): Status: ACTIVE | Noted: 2019-08-15

## 2023-01-09 PROBLEM — N40.0 BPH (BENIGN PROSTATIC HYPERPLASIA): Status: RESOLVED | Noted: 2019-04-18 | Resolved: 2023-01-09

## 2023-01-09 PROBLEM — G47.33 OSA (OBSTRUCTIVE SLEEP APNEA): Status: ACTIVE | Noted: 2018-08-27

## 2023-01-09 PROBLEM — C73 METASTASIS FROM THYROID CANCER (H): Status: ACTIVE | Noted: 2021-08-04

## 2023-01-09 LAB
ANION GAP SERPL CALCULATED.3IONS-SCNC: 12 MMOL/L (ref 3–14)
ATRIAL RATE - MUSE: 88 BPM
BASE EXCESS BLDV CALC-SCNC: 0.4 MMOL/L (ref -7.7–1.9)
BASOPHILS # BLD AUTO: 0.1 10E3/UL (ref 0–0.2)
BASOPHILS NFR BLD AUTO: 1 %
BUN SERPL-MCNC: 15 MG/DL (ref 7–30)
CALCIUM SERPL-MCNC: 9.2 MG/DL (ref 8.5–10.1)
CHLORIDE BLD-SCNC: 99 MMOL/L (ref 94–109)
CO2 SERPL-SCNC: 25 MMOL/L (ref 20–32)
CREAT SERPL-MCNC: 0.8 MG/DL (ref 0.66–1.25)
DIASTOLIC BLOOD PRESSURE - MUSE: NORMAL MMHG
EOSINOPHIL # BLD AUTO: 1 10E3/UL (ref 0–0.7)
EOSINOPHIL NFR BLD AUTO: 11 %
ERYTHROCYTE [DISTWIDTH] IN BLOOD BY AUTOMATED COUNT: 16.4 % (ref 10–15)
GFR SERPL CREATININE-BSD FRML MDRD: >90 ML/MIN/1.73M2
GLUCOSE BLD-MCNC: 110 MG/DL (ref 70–99)
HCO3 BLDV-SCNC: 27 MMOL/L (ref 21–28)
HCT VFR BLD AUTO: 42.5 % (ref 40–53)
HGB BLD-MCNC: 13.8 G/DL (ref 13.3–17.7)
IMM GRANULOCYTES # BLD: 0.1 10E3/UL
IMM GRANULOCYTES NFR BLD: 1 %
INTERPRETATION ECG - MUSE: NORMAL
LYMPHOCYTES # BLD AUTO: 0.4 10E3/UL (ref 0.8–5.3)
LYMPHOCYTES NFR BLD AUTO: 5 %
MCH RBC QN AUTO: 28.9 PG (ref 26.5–33)
MCHC RBC AUTO-ENTMCNC: 32.5 G/DL (ref 31.5–36.5)
MCV RBC AUTO: 89 FL (ref 78–100)
MONOCYTES # BLD AUTO: 1.1 10E3/UL (ref 0–1.3)
MONOCYTES NFR BLD AUTO: 13 %
NEUTROPHILS # BLD AUTO: 6.3 10E3/UL (ref 1.6–8.3)
NEUTROPHILS NFR BLD AUTO: 69 %
NRBC # BLD AUTO: 0 10E3/UL
NRBC BLD AUTO-RTO: 0 /100
NT-PROBNP SERPL-MCNC: 820 PG/ML (ref 0–900)
O2/TOTAL GAS SETTING VFR VENT: 4 %
OXYHGB MFR BLDV: 36 % (ref 70–75)
P AXIS - MUSE: 32 DEGREES
PCO2 BLDV: 52 MM HG (ref 40–50)
PH BLDV: 7.33 [PH] (ref 7.32–7.43)
PLATELET # BLD AUTO: 116 10E3/UL (ref 150–450)
PO2 BLDV: 22 MM HG (ref 25–47)
POTASSIUM BLD-SCNC: 3.4 MMOL/L (ref 3.4–5.3)
PR INTERVAL - MUSE: 142 MS
QRS DURATION - MUSE: 98 MS
QT - MUSE: 410 MS
QTC - MUSE: 496 MS
R AXIS - MUSE: 58 DEGREES
RBC # BLD AUTO: 4.78 10E6/UL (ref 4.4–5.9)
SODIUM SERPL-SCNC: 136 MMOL/L (ref 133–144)
SYSTOLIC BLOOD PRESSURE - MUSE: NORMAL MMHG
T AXIS - MUSE: -3 DEGREES
TROPONIN I SERPL HS-MCNC: 16 NG/L
VENTRICULAR RATE- MUSE: 88 BPM
WBC # BLD AUTO: 8.9 10E3/UL (ref 4–11)

## 2023-01-09 PROCEDURE — 85025 COMPLETE CBC W/AUTO DIFF WBC: CPT | Performed by: EMERGENCY MEDICINE

## 2023-01-09 PROCEDURE — 80048 BASIC METABOLIC PNL TOTAL CA: CPT | Performed by: EMERGENCY MEDICINE

## 2023-01-09 PROCEDURE — 71250 CT THORAX DX C-: CPT | Mod: MG

## 2023-01-09 PROCEDURE — 250N000013 HC RX MED GY IP 250 OP 250 PS 637: Performed by: EMERGENCY MEDICINE

## 2023-01-09 PROCEDURE — 83880 ASSAY OF NATRIURETIC PEPTIDE: CPT | Performed by: EMERGENCY MEDICINE

## 2023-01-09 PROCEDURE — 36415 COLL VENOUS BLD VENIPUNCTURE: CPT | Performed by: EMERGENCY MEDICINE

## 2023-01-09 PROCEDURE — 84484 ASSAY OF TROPONIN QUANT: CPT | Performed by: EMERGENCY MEDICINE

## 2023-01-09 PROCEDURE — 99222 1ST HOSP IP/OBS MODERATE 55: CPT | Mod: AI | Performed by: STUDENT IN AN ORGANIZED HEALTH CARE EDUCATION/TRAINING PROGRAM

## 2023-01-09 PROCEDURE — 93005 ELECTROCARDIOGRAM TRACING: CPT

## 2023-01-09 PROCEDURE — G0378 HOSPITAL OBSERVATION PER HR: HCPCS

## 2023-01-09 PROCEDURE — 250N000013 HC RX MED GY IP 250 OP 250 PS 637: Performed by: STUDENT IN AN ORGANIZED HEALTH CARE EDUCATION/TRAINING PROGRAM

## 2023-01-09 PROCEDURE — 99285 EMERGENCY DEPT VISIT HI MDM: CPT | Mod: 25

## 2023-01-09 PROCEDURE — 82805 BLOOD GASES W/O2 SATURATION: CPT | Performed by: EMERGENCY MEDICINE

## 2023-01-09 RX ORDER — LEVOTHYROXINE SODIUM 137 UG/1
137 TABLET ORAL DAILY
Status: DISCONTINUED | OUTPATIENT
Start: 2023-01-10 | End: 2023-01-14 | Stop reason: HOSPADM

## 2023-01-09 RX ORDER — METOPROLOL SUCCINATE 50 MG/1
50 TABLET, EXTENDED RELEASE ORAL DAILY
Status: DISCONTINUED | OUTPATIENT
Start: 2023-01-10 | End: 2023-01-14 | Stop reason: HOSPADM

## 2023-01-09 RX ORDER — NALOXONE HYDROCHLORIDE 0.4 MG/ML
0.4 INJECTION, SOLUTION INTRAMUSCULAR; INTRAVENOUS; SUBCUTANEOUS
Status: DISCONTINUED | OUTPATIENT
Start: 2023-01-09 | End: 2023-01-14 | Stop reason: HOSPADM

## 2023-01-09 RX ORDER — FUROSEMIDE 20 MG
20 TABLET ORAL 2 TIMES DAILY
Status: DISCONTINUED | OUTPATIENT
Start: 2023-01-10 | End: 2023-01-14 | Stop reason: HOSPADM

## 2023-01-09 RX ORDER — AMLODIPINE BESYLATE 5 MG/1
5 TABLET ORAL DAILY
Status: DISCONTINUED | OUTPATIENT
Start: 2023-01-10 | End: 2023-01-14 | Stop reason: HOSPADM

## 2023-01-09 RX ORDER — ACETAMINOPHEN 325 MG/1
975 TABLET ORAL 3 TIMES DAILY
Status: DISCONTINUED | OUTPATIENT
Start: 2023-01-09 | End: 2023-01-14 | Stop reason: HOSPADM

## 2023-01-09 RX ORDER — OXYCODONE HYDROCHLORIDE 5 MG/1
5 TABLET ORAL ONCE
Status: COMPLETED | OUTPATIENT
Start: 2023-01-09 | End: 2023-01-09

## 2023-01-09 RX ORDER — FLUTICASONE FUROATE AND VILANTEROL 100; 25 UG/1; UG/1
1 POWDER RESPIRATORY (INHALATION) DAILY
Status: DISCONTINUED | OUTPATIENT
Start: 2023-01-09 | End: 2023-01-09

## 2023-01-09 RX ORDER — ROSUVASTATIN CALCIUM 10 MG/1
10 TABLET, COATED ORAL DAILY
Status: DISCONTINUED | OUTPATIENT
Start: 2023-01-09 | End: 2023-01-14 | Stop reason: HOSPADM

## 2023-01-09 RX ORDER — LIDOCAINE 4 G/G
2 PATCH TOPICAL ONCE
Status: COMPLETED | OUTPATIENT
Start: 2023-01-09 | End: 2023-01-10

## 2023-01-09 RX ORDER — ONDANSETRON 2 MG/ML
4 INJECTION INTRAMUSCULAR; INTRAVENOUS EVERY 6 HOURS PRN
Status: DISCONTINUED | OUTPATIENT
Start: 2023-01-09 | End: 2023-01-14 | Stop reason: HOSPADM

## 2023-01-09 RX ORDER — NALOXONE HYDROCHLORIDE 0.4 MG/ML
0.2 INJECTION, SOLUTION INTRAMUSCULAR; INTRAVENOUS; SUBCUTANEOUS
Status: DISCONTINUED | OUTPATIENT
Start: 2023-01-09 | End: 2023-01-14 | Stop reason: HOSPADM

## 2023-01-09 RX ORDER — DULOXETIN HYDROCHLORIDE 20 MG/1
20 CAPSULE, DELAYED RELEASE ORAL 2 TIMES DAILY
Status: DISCONTINUED | OUTPATIENT
Start: 2023-01-09 | End: 2023-01-14 | Stop reason: HOSPADM

## 2023-01-09 RX ORDER — LIDOCAINE 4 G/G
2 PATCH TOPICAL
Status: DISCONTINUED | OUTPATIENT
Start: 2023-01-10 | End: 2023-01-09

## 2023-01-09 RX ORDER — LIDOCAINE 40 MG/G
CREAM TOPICAL
Status: DISCONTINUED | OUTPATIENT
Start: 2023-01-09 | End: 2023-01-14 | Stop reason: HOSPADM

## 2023-01-09 RX ORDER — ONDANSETRON 4 MG/1
4 TABLET, ORALLY DISINTEGRATING ORAL EVERY 6 HOURS PRN
Status: DISCONTINUED | OUTPATIENT
Start: 2023-01-09 | End: 2023-01-14 | Stop reason: HOSPADM

## 2023-01-09 RX ORDER — OXYCODONE HYDROCHLORIDE 5 MG/1
5 TABLET ORAL 3 TIMES DAILY PRN
Status: DISCONTINUED | OUTPATIENT
Start: 2023-01-09 | End: 2023-01-10

## 2023-01-09 RX ORDER — ALBUTEROL SULFATE 0.83 MG/ML
1.25 SOLUTION RESPIRATORY (INHALATION) EVERY 4 HOURS PRN
Status: DISCONTINUED | OUTPATIENT
Start: 2023-01-09 | End: 2023-01-10

## 2023-01-09 RX ORDER — ASPIRIN 81 MG/1
81 TABLET, CHEWABLE ORAL 2 TIMES DAILY
Status: DISCONTINUED | OUTPATIENT
Start: 2023-01-09 | End: 2023-01-14 | Stop reason: HOSPADM

## 2023-01-09 RX ADMIN — OXYCODONE HYDROCHLORIDE 5 MG: 5 TABLET ORAL at 20:07

## 2023-01-09 RX ADMIN — LIDOCAINE 2 PATCH: 560 PATCH PERCUTANEOUS; TOPICAL; TRANSDERMAL at 13:44

## 2023-01-09 RX ADMIN — OXYCODONE HYDROCHLORIDE 5 MG: 5 TABLET ORAL at 15:06

## 2023-01-09 RX ADMIN — ACETAMINOPHEN 975 MG: 325 TABLET, FILM COATED ORAL at 20:07

## 2023-01-09 RX ADMIN — ROSUVASTATIN CALCIUM 10 MG: 10 TABLET, FILM COATED ORAL at 20:07

## 2023-01-09 RX ADMIN — ASPIRIN 81 MG CHEWABLE TABLET 81 MG: 81 TABLET CHEWABLE at 20:07

## 2023-01-09 RX ADMIN — DULOXETINE HYDROCHLORIDE 20 MG: 20 CAPSULE, DELAYED RELEASE ORAL at 20:07

## 2023-01-09 ASSESSMENT — ACTIVITIES OF DAILY LIVING (ADL)
ADLS_ACUITY_SCORE: 35
ADLS_ACUITY_SCORE: 24
ADLS_ACUITY_SCORE: 24
ADLS_ACUITY_SCORE: 35

## 2023-01-09 ASSESSMENT — ENCOUNTER SYMPTOMS
SHORTNESS OF BREATH: 1
ABDOMINAL PAIN: 0
UNEXPECTED WEIGHT CHANGE: 0
FEVER: 0
ABDOMINAL DISTENTION: 0

## 2023-01-09 NOTE — TELEPHONE ENCOUNTER
Huddled with Dr. Dozier who advised pt does need to go to ER and be evaluated as Harrison is very high risk.  Called and informed pt's wife, Adeola who does have consent to communicate and was on call earlier.  She agreed to take Harrison to ER.  Nora Irwin, RN  [facility] RN Triage Team

## 2023-01-09 NOTE — ED NOTES
[facility]  ED Nurse Handoff Report    ED Chief complaint: Fall, Shortness of Breath, Chest Pain, and Cough      ED Diagnosis:   Final diagnoses:   Closed fracture of one rib of left side, initial encounter   Acute on chronic respiratory failure with hypoxia (H)       Code Status: not addressed in ED    Allergies:   Allergies   Allergen Reactions     Levaquin Difficulty breathing     Plavix [Clopidogrel Bisulfate] Itching     Atorvastatin Calcium Cramps     lipitor     Cats      Dogs      Hctz [Hydrochlorothiazide]      Rash on legs     Sulfasalazine Other (See Comments)     Stomach cramps        Patient Story: Hx of COPD, lung CA, thyroid CA, fell last Wednesday, tripped and fell onto left side onto a chair. Then next day developed cough and URI. C/o increasing SOB, worse with exertion. Pt is on home O2. Sats were in the 80's with exertion. 85% on room air.   Focused Assessment:    Labs Ordered and Resulted from Time of ED Arrival to Time of ED Departure   BASIC METABOLIC PANEL - Abnormal       Result Value    Sodium 136      Potassium 3.4      Chloride 99      Carbon Dioxide (CO2) 25      Anion Gap 12      Urea Nitrogen 15      Creatinine 0.80      Calcium 9.2      Glucose 110 (*)     GFR Estimate >90     BLOOD GAS VENOUS WITH OXYHEMOGLOBIN - Abnormal    pH Venous 7.33      pCO2 Venous 52 (*)     pO2 Venous 22 (*)     Bicarbonate Venous 27      FIO2 4      Oxyhemoglobin Venous 36 (*)     Base Excess/Deficit (+/-) 0.4     CBC WITH PLATELETS AND DIFFERENTIAL - Abnormal    WBC Count 8.9      RBC Count 4.78      Hemoglobin 13.8      Hematocrit 42.5      MCV 89      MCH 28.9      MCHC 32.5      RDW 16.4 (*)     Platelet Count 116 (*)     % Neutrophils 69      % Lymphocytes 5      % Monocytes 13      % Eosinophils 11      % Basophils 1      % Immature Granulocytes 1      NRBCs per 100 WBC 0      Absolute Neutrophils 6.3      Absolute Lymphocytes 0.4 (*)     Absolute Monocytes 1.1      Absolute  Eosinophils 1.0 (*)     Absolute Basophils 0.1      Absolute Immature Granulocytes 0.1      Absolute NRBCs 0.0     TROPONIN I - Normal    Troponin I High Sensitivity 16     NT PROBNP INPATIENT - Normal    N terminal Pro BNP Inpatient 820       Chest CT w/o contrast   Final Result   IMPRESSION:    1.  Acute, minimally displaced left eighth rib fracture. No underlying   pleural effusion or pneumothorax.   2.  Significantly increased consolidation in the right upper lobe in   area of previously described radiation fibrosis, could represent   pneumonia but recurrent disease is also possible. Recommend   pulmonology referral if not previously performed.   3.  Significant interval enlargement of mediastinal lymph nodes,   likely related to #2.   4.  Likely mild interstitial edema.      FERNANDEZ ARMANDO MD            SYSTEM ID:  [id number]            Treatments and/or interventions provided: lidocaine patch, oxycodone  Patient's response to treatments and/or interventions: no change     To be done/followed up on inpatient unit:  see orders    Does this patient have any cognitive concerns?: none    Activity level - Baseline/Home:  Independent  Activity Level - Current:   Stand with Assist    Patient's Preferred language: English   Needed?: No    Isolation: None  Infection: Not Applicable  Patient tested for COVID 19 prior to admission: NO  Bariatric?: No    Vital Signs:   Vitals:    01/09/23 1201 01/09/23 1330   BP: 132/64 135/68   Pulse: 95 94   Resp:  26   SpO2: 91% 93%       Cardiac Rhythm:     Was the PSS-3 completed:   Yes  What interventions are required if any?               Family Comments: none at bedside  OBS brochure/video discussed/provided to patient/family: No              Name of person given brochure if not patient:               Relationship to patient:     For the majority of the shift this patient's behavior was Green.   Behavioral interventions performed were none.    ED NURSE PHONE NUMBER:  *[phone]
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
## 2023-01-09 NOTE — TELEPHONE ENCOUNTER
2nd level triage+    Nurse Triage SBAR    Is this a 2nd Level Triage? YES    Situation:   Background: Harrison has hx pulmonology complications, and fell and injured side early last week,thinks he broke a rib as side is very sore/painful.  Has not been seen for this. Did not injur head.  Was breathing on RA last week, then starting Thursday needed oxygen and has since titrated up to 3L to keep self at 92%.  Without oxygen is dropping to low 80's, high 70's. Denies other chest pain.     Protocol Recommended Disposition:   Go to ED Now    Recommendation:  Harrison should be seen asap in ED, however he's trying to hold out as he has a visit with the Pulmonary Oncologist at [facility] at 1pm today.  Harrison and wife are wondering if any way Dr. Dozier can see them before hand or possibly complete an Xray, as Harrison is trying to avoid ER?     Routed to provider    Does the patient meet one of the following criteria for ADS visit consideration? 16+ years old, with an [facility] PCP     TIP  Providers, please consider if this condition is appropriate for management at one of our Acute and Diagnostic Services sites.     If patient is a good candidate, please use dotphrase <dot>triageresponse and select Refer to ADS to document.    Reason for Disposition    MODERATE difficulty breathing (e.g., speaks in phrases, SOB even at rest, pulse 100-120) of new-onset or worse than normal    Oxygen level (e.g., pulse oximetry) 90 percent or lower    Additional Information    Negative: SEVERE difficulty breathing (e.g., struggling for each breath, speaks in single words, pulse > 120)    Negative: Breathing stopped and hasn't returned    Negative: Choking on something    Negative: Bluish (or gray) lips or face    Negative: Difficult to awaken or acting confused (e.g., disoriented, slurred speech)    Negative: Passed out (i.e., fainted, collapsed and was not responding)    Negative: Wheezing started suddenly after medicine, an allergic food, or bee  "sting    Negative: Stridor    Negative: Slow, shallow and weak breathing    Negative: Sounds like a life-threatening emergency to the triager    Negative: Chest pain    Negative: Wheezing (high pitched whistling sound) and previous asthma attacks or use of asthma medicines    Negative: Difficulty breathing and within 14 days of COVID-19 Exposure    Negative: Difficulty breathing and only present when coughing    Negative: Difficulty breathing and only from stuffy nose    Negative: Difficulty breathing and only from stuffy nose or runny nose from common cold    Answer Assessment - Initial Assessment Questions  1. RESPIRATORY STATUS: \"Describe your breathing?\" (e.g., wheezing, shortness of breath, unable to speak, severe coughing)      Thursday 1 to today 3.  2. ONSET: \"When did this breathing problem begin?\"     Thursday  3. PATTERN \"Does the difficult breathing come and go, or has it been constant since it started?\"       Continual step down   4. SEVERITY: \"How bad is your breathing?\" (e.g., mild, moderate, severe)     - MILD: No SOB at rest, mild SOB with walking, speaks normally in sentences, can lie down, no retractions, pulse < 100.     - MODERATE: SOB at rest, SOB with minimal exertion and prefers to sit, cannot lie down flat, speaks in phrases, mild retractions, audible wheezing, pulse 100-120.     - SEVERE: Very SOB at rest, speaks in single words, struggling to breathe, sitting hunched forward, retractions, pulse > 120      Severe without oxygen - with oxygen still having sob   5. RECURRENT SYMPTOM: \"Have you had difficulty breathing before?\" If Yes, ask: \"When was the last time?\" and \"What happened that time?\"       Yes   6. CARDIAC HISTORY: \"Do you have any history of heart disease?\" (e.g., heart attack, angina, bypass surgery, angioplasty)         7. LUNG HISTORY: \"Do you have any history of lung disease?\"  (e.g., pulmonary embolus, asthma, emphysema)      yes  8. CAUSE: \"What do you think is causing the " "breathing problem?\"       Broken rib   9. OTHER SYMPTOMS: \"Do you have any other symptoms? (e.g., dizziness, runny nose, cough, chest pain, fever)      Runny nose, cold sx, Having rib pain, but denies other chest pain   10. O2 SATURATION MONITOR:  \"Do you use an oxygen saturation monitor (pulse oximeter) at home?\" If Yes, \"What is your reading (oxygen level) today?\" \"What is your usual oxygen saturation reading?\" (e.g., 95%)        92 with 3L    Protocols used: BREATHING DIFFICULTY-A-OH      "

## 2023-01-09 NOTE — DISCHARGE INSTRUCTIONS
HOMECARE NOTE:   Your doctor has ordered home care to help you after your hospital stay.  The staff will contact you to schedule your first visit.  This service will be provided by [facility].  If you have any question, or have not received a call within 48 hours of discharge, please call them at [phone] or [phone]. *please see homecare quality ratings for all homecares in your area at www.medicare.gov/care-compare

## 2023-01-09 NOTE — ED TRIAGE NOTES
Hx of COPD, lung CA, thyroid CA, fell last Wednesday, tripped and fell onto left side onto a chair. Then next day developed cough and URI. C/o increasing SOB, worse with exertion. Pt is on home O2. Sats were in the 80's with exertion. 85% on room air.      Triage Assessment     Row Name 01/09/23 120       Triage Assessment (Adult)    Airway WDL WDL       Respiratory WDL    Respiratory WDL cough  SOB, worse with exhersion.       Skin Circulation/Temperature WDL    Skin Circulation/Temperature WDL X  Ecchymosis to left flank. Multiple bruises on bilateral arms, pt is on blood thinning medication.       Cardiac WDL    Cardiac WDL --  Skin PWD, CP with cough       Peripheral/Neurovascular WDL    Peripheral Neurovascular WDL WDL       Cognitive/Neuro/Behavioral WDL    Cognitive/Neuro/Behavioral WDL WDL

## 2023-01-09 NOTE — PHARMACY-ADMISSION MEDICATION HISTORY
Pharmacy Medication History  Admission medication history interview status for the 1/9/2023  admission is complete. See EPIC admission navigator for prior to admission medications     Location of Interview: Patient room  Medication history sources: Patient, Patient's family/friend (daughter) and Surescripts    Significant changes made to the medication list:  Deleted carisorodol, ceftin, hydroxyzine, prednisone, viagra, fish oil    Additional medication history information:   none    Medication reconciliation completed by provider prior to medication history? No    Time spent in this activity: 25 minutes    Prior to Admission medications    Medication Sig Last Dose Taking? Auth Provider Long Term End Date   albuterol (ACCUNEB) 1.25 MG/3ML neb solution Take 1 vial (1.25 mg) by nebulization every 4 hours as needed for shortness of breath or wheezing 1/9/2023 Yes Khoa Handy MD Yes    albuterol (PROAIR HFA/PROVENTIL HFA/VENTOLIN HFA) 108 (90 Base) MCG/ACT inhaler INHALE 2 PUFFS BY MOUTH EVERY 6 HOURS AS NEEDED FOR WHEEZE OR FOR SHORTNESS OF BREATH 1/9/2023 Yes Yaneli Dzoier MD Yes    amLODIPine (NORVASC) 5 MG tablet Take 5 mg by mouth daily 1/9/2023 at am Yes Yaneli Dozier MD Yes    ascorbic acid 1000 MG TABS tablet Take 1,000 mg by mouth daily 1/8/2023 at am Yes Unknown, Entered By History     aspirin 81 MG tablet Take 1 tablet by mouth 2 times daily 1/9/2023 at am Yes Reported, Patient     calcium carbonate (OS-KARLIE) 1500 (600 Ca) MG tablet Take 600 mg by mouth 2 times daily (with meals) 1/9/2023 at am Yes Unknown, Entered By History     cholecalciferol 25 MCG (1000 UT) TABS Take 1,000 Units by mouth daily  1/9/2023 at am Yes Reported, Patient     DULoxetine (CYMBALTA) 20 MG capsule Take 1 capsule (20 mg) by mouth 2 times daily 1/8/2023 at pm Yes Yaneli Dozier MD Yes    Fluticasone-Umeclidin-Vilanterol (TRELEGY ELLIPTA) 100-62.5-25 MCG/INH oral inhaler Inhale 1 puff into the lungs daily 1/9/2023 at am Yes  Yaneli Dozier MD     furosemide (LASIX) 20 MG tablet Take 1 tablet (20 mg) by mouth 2 times daily In morning and at noon 1/9/2023 at am Yes Yaneli Dozier MD Yes    levothyroxine (SYNTHROID/LEVOTHROID) 137 MCG tablet Take 1 tablet (137 mcg) by mouth daily , starting on 9/23/21 1/9/2023 at am Yes Demarcus Simmons MD Yes    metoprolol succinate ER (TOPROL XL) 50 MG 24 hr tablet Take 1 tablet (50 mg) by mouth daily Takes in AM 1/9/2023 at am Yes Yaneli Dozier MD Yes    multivitamin w/minerals (THERA-VIT-M) tablet Take 1 tablet by mouth daily  1/9/2023 at am Yes Reported, Patient     rosuvastatin (CRESTOR) 10 MG tablet TAKE 1 TABLET BY MOUTH EVERY DAY 1/8/2023 at pm Yes Yaneli Dozier MD Yes    acetaminophen (TYLENOL) 325 MG tablet Take 3 tablets (975 mg) by mouth 3 times daily prn  Jaida Ayala MD     Carisoprodol 250 MG TABS Take 250 mg by mouth daily as needed (spasms)  Patient not taking: Reported on 1/9/2023 Not Taking  Yaneli Dozier MD     guaiFENesin (MUCINEX) 600 MG 12 hr tablet Take 600 mg by mouth 2 times daily as needed for congestion prn  Reported, Patient     hydrOXYzine (ATARAX) 10 MG tablet Take 1 tablet (10 mg) by mouth 3 times daily as needed for itching  Patient not taking: Reported on 1/9/2023 Not Taking  Yaneli Dozier MD     Lidocaine (LIDOCARE) 4 % Patch Place 1 patch onto the skin daily as needed for moderate pain To prevent lidocaine toxicity, patient should be patch free for 12 hrs daily. For low back pain  Patient not taking: Reported on 1/9/2023 Not Taking  Vidya Hall PA-C     naloxone (NARCAN) 4 MG/0.1ML nasal spray Spray 1 spray (4 mg) into one nostril alternating nostrils once as needed for opioid reversal every 2-3 minutes until assistance arrives  Patient not taking: Reported on 12/2/2022 Yaneli Coon MD Yes    nitroGLYcerin (NITROSTAT) 0.4 MG sublingual tablet FOR CHEST PAIN PLACE 1 TAB UNDER TONGUE EVERY 5 MIN FOR 3 DOSES. IF SYMPTOMS PERSIST 5  MIN AFTER 1ST DOSE CALL 911  Patient not taking: Reported on 12/2/2022 prn  Yaneli Dozier MD Yes    oxyCODONE (ROXICODONE) 5 MG tablet Take 1 tablet (5 mg) by mouth 3 times daily as needed for moderate to severe pain prn  Yaneli Dozier MD No    predniSONE (DELTASONE) 10 MG tablet Take 40 mg daily for 2 weeks, then 30 mg daily for 2 weeks, then 20 mg daily for 2 weeks, then 10 mg daily. Continue to take Prednisone 10 mg daily until you will see the Pulmonologist.  Patient not taking: Reported on 1/9/2023 Not Taking  Jaida Ayaal MD No    senna-docusate (SENOKOT-S/PERICOLACE) 8.6-50 MG tablet Take 1 tablet by mouth 2 times daily as needed for constipation prn  Unknown, Entered By History         The information provided in this note is only as accurate as the sources available at the time of update(s)

## 2023-01-10 ENCOUNTER — APPOINTMENT (OUTPATIENT)
Dept: PHYSICAL THERAPY | Facility: CLINIC | Age: 78
DRG: 205 | End: 2023-01-10
Attending: STUDENT IN AN ORGANIZED HEALTH CARE EDUCATION/TRAINING PROGRAM
Payer: MEDICARE

## 2023-01-10 LAB
ANION GAP SERPL CALCULATED.3IONS-SCNC: 8 MMOL/L (ref 3–14)
BUN SERPL-MCNC: 13 MG/DL (ref 7–30)
CALCIUM SERPL-MCNC: 8.8 MG/DL (ref 8.5–10.1)
CHLORIDE BLD-SCNC: 101 MMOL/L (ref 94–109)
CO2 SERPL-SCNC: 28 MMOL/L (ref 20–32)
CREAT SERPL-MCNC: 0.9 MG/DL (ref 0.66–1.25)
CRP SERPL-MCNC: 181 MG/L (ref 0–8)
ERYTHROCYTE [DISTWIDTH] IN BLOOD BY AUTOMATED COUNT: 16.7 % (ref 10–15)
GFR SERPL CREATININE-BSD FRML MDRD: 89 ML/MIN/1.73M2
GLUCOSE BLD-MCNC: 77 MG/DL (ref 70–99)
HCT VFR BLD AUTO: 35.8 % (ref 40–53)
HGB BLD-MCNC: 12 G/DL (ref 13.3–17.7)
MCH RBC QN AUTO: 29.6 PG (ref 26.5–33)
MCHC RBC AUTO-ENTMCNC: 33.5 G/DL (ref 31.5–36.5)
MCV RBC AUTO: 88 FL (ref 78–100)
PLATELET # BLD AUTO: 117 10E3/UL (ref 150–450)
POTASSIUM BLD-SCNC: 3.3 MMOL/L (ref 3.4–5.3)
PROCALCITONIN SERPL-MCNC: 0.39 NG/ML
RBC # BLD AUTO: 4.06 10E6/UL (ref 4.4–5.9)
SODIUM SERPL-SCNC: 137 MMOL/L (ref 133–144)
WBC # BLD AUTO: 7.7 10E3/UL (ref 4–11)

## 2023-01-10 PROCEDURE — 999N000157 HC STATISTIC RCP TIME EA 10 MIN: Mod: 76

## 2023-01-10 PROCEDURE — 250N000009 HC RX 250: Performed by: STUDENT IN AN ORGANIZED HEALTH CARE EDUCATION/TRAINING PROGRAM

## 2023-01-10 PROCEDURE — 999N000157 HC STATISTIC RCP TIME EA 10 MIN

## 2023-01-10 PROCEDURE — G0378 HOSPITAL OBSERVATION PER HR: HCPCS

## 2023-01-10 PROCEDURE — 250N000013 HC RX MED GY IP 250 OP 250 PS 637: Performed by: STUDENT IN AN ORGANIZED HEALTH CARE EDUCATION/TRAINING PROGRAM

## 2023-01-10 PROCEDURE — 97161 PT EVAL LOW COMPLEX 20 MIN: CPT | Mod: GP

## 2023-01-10 PROCEDURE — 86140 C-REACTIVE PROTEIN: CPT | Performed by: PHYSICIAN ASSISTANT

## 2023-01-10 PROCEDURE — 84145 PROCALCITONIN (PCT): CPT | Performed by: PHYSICIAN ASSISTANT

## 2023-01-10 PROCEDURE — 36415 COLL VENOUS BLD VENIPUNCTURE: CPT | Performed by: STUDENT IN AN ORGANIZED HEALTH CARE EDUCATION/TRAINING PROGRAM

## 2023-01-10 PROCEDURE — 250N000013 HC RX MED GY IP 250 OP 250 PS 637: Performed by: PHYSICIAN ASSISTANT

## 2023-01-10 PROCEDURE — 85014 HEMATOCRIT: CPT | Performed by: STUDENT IN AN ORGANIZED HEALTH CARE EDUCATION/TRAINING PROGRAM

## 2023-01-10 PROCEDURE — 94640 AIRWAY INHALATION TREATMENT: CPT | Mod: 76

## 2023-01-10 PROCEDURE — 250N000009 HC RX 250: Performed by: PHYSICIAN ASSISTANT

## 2023-01-10 PROCEDURE — 96375 TX/PRO/DX INJ NEW DRUG ADDON: CPT

## 2023-01-10 PROCEDURE — 97530 THERAPEUTIC ACTIVITIES: CPT | Mod: GP

## 2023-01-10 PROCEDURE — 96374 THER/PROPH/DIAG INJ IV PUSH: CPT

## 2023-01-10 PROCEDURE — 80048 BASIC METABOLIC PNL TOTAL CA: CPT | Performed by: STUDENT IN AN ORGANIZED HEALTH CARE EDUCATION/TRAINING PROGRAM

## 2023-01-10 PROCEDURE — 250N000011 HC RX IP 250 OP 636: Performed by: PHYSICIAN ASSISTANT

## 2023-01-10 PROCEDURE — 99233 SBSQ HOSP IP/OBS HIGH 50: CPT | Performed by: PHYSICIAN ASSISTANT

## 2023-01-10 PROCEDURE — 94640 AIRWAY INHALATION TREATMENT: CPT

## 2023-01-10 RX ORDER — POTASSIUM CHLORIDE 1500 MG/1
20 TABLET, EXTENDED RELEASE ORAL ONCE
Status: COMPLETED | OUTPATIENT
Start: 2023-01-10 | End: 2023-01-10

## 2023-01-10 RX ORDER — LIDOCAINE 4 G/G
1 PATCH TOPICAL
Status: DISCONTINUED | OUTPATIENT
Start: 2023-01-10 | End: 2023-01-14 | Stop reason: HOSPADM

## 2023-01-10 RX ORDER — CEFTRIAXONE 1 G/1
1 INJECTION, POWDER, FOR SOLUTION INTRAMUSCULAR; INTRAVENOUS EVERY 24 HOURS
Status: DISCONTINUED | OUTPATIENT
Start: 2023-01-10 | End: 2023-01-11

## 2023-01-10 RX ORDER — AZITHROMYCIN 500 MG/1
500 INJECTION, POWDER, LYOPHILIZED, FOR SOLUTION INTRAVENOUS ONCE
Status: COMPLETED | OUTPATIENT
Start: 2023-01-10 | End: 2023-01-10

## 2023-01-10 RX ORDER — ALBUTEROL SULFATE 0.83 MG/ML
1.25 SOLUTION RESPIRATORY (INHALATION) 3 TIMES DAILY
Status: DISCONTINUED | OUTPATIENT
Start: 2023-01-10 | End: 2023-01-10 | Stop reason: ALTCHOICE

## 2023-01-10 RX ORDER — OXYCODONE HYDROCHLORIDE 5 MG/1
5 TABLET ORAL EVERY 4 HOURS PRN
Status: DISCONTINUED | OUTPATIENT
Start: 2023-01-10 | End: 2023-01-14 | Stop reason: HOSPADM

## 2023-01-10 RX ORDER — ALBUTEROL SULFATE 0.83 MG/ML
1.25 SOLUTION RESPIRATORY (INHALATION) 4 TIMES DAILY
Status: DISCONTINUED | OUTPATIENT
Start: 2023-01-10 | End: 2023-01-14 | Stop reason: HOSPADM

## 2023-01-10 RX ADMIN — LIDOCAINE 1 PATCH: 560 PATCH PERCUTANEOUS; TOPICAL; TRANSDERMAL at 10:04

## 2023-01-10 RX ADMIN — ALBUTEROL SULFATE 1.25 MG: 2.5 SOLUTION RESPIRATORY (INHALATION) at 11:36

## 2023-01-10 RX ADMIN — DULOXETINE HYDROCHLORIDE 20 MG: 20 CAPSULE, DELAYED RELEASE ORAL at 09:14

## 2023-01-10 RX ADMIN — METOPROLOL SUCCINATE 50 MG: 50 TABLET, EXTENDED RELEASE ORAL at 09:14

## 2023-01-10 RX ADMIN — ROSUVASTATIN CALCIUM 10 MG: 10 TABLET, FILM COATED ORAL at 20:10

## 2023-01-10 RX ADMIN — OXYCODONE HYDROCHLORIDE 5 MG: 5 TABLET ORAL at 10:04

## 2023-01-10 RX ADMIN — ASPIRIN 81 MG CHEWABLE TABLET 81 MG: 81 TABLET CHEWABLE at 20:10

## 2023-01-10 RX ADMIN — ACETAMINOPHEN 975 MG: 325 TABLET, FILM COATED ORAL at 20:10

## 2023-01-10 RX ADMIN — ASPIRIN 81 MG CHEWABLE TABLET 81 MG: 81 TABLET CHEWABLE at 09:14

## 2023-01-10 RX ADMIN — DULOXETINE HYDROCHLORIDE 20 MG: 20 CAPSULE, DELAYED RELEASE ORAL at 20:11

## 2023-01-10 RX ADMIN — AMLODIPINE BESYLATE 5 MG: 5 TABLET ORAL at 09:14

## 2023-01-10 RX ADMIN — LEVOTHYROXINE SODIUM 137 MCG: 137 TABLET ORAL at 09:14

## 2023-01-10 RX ADMIN — POTASSIUM CHLORIDE 20 MEQ: 1500 TABLET, EXTENDED RELEASE ORAL at 09:30

## 2023-01-10 RX ADMIN — ALBUTEROL SULFATE 1.25 MG: 2.5 SOLUTION RESPIRATORY (INHALATION) at 16:59

## 2023-01-10 RX ADMIN — ALBUTEROL SULFATE 1.25 MG: 2.5 SOLUTION RESPIRATORY (INHALATION) at 20:03

## 2023-01-10 RX ADMIN — CEFTRIAXONE SODIUM 1 G: 1 INJECTION, POWDER, FOR SOLUTION INTRAMUSCULAR; INTRAVENOUS at 13:40

## 2023-01-10 RX ADMIN — OXYCODONE HYDROCHLORIDE 5 MG: 5 TABLET ORAL at 05:24

## 2023-01-10 RX ADMIN — OXYCODONE HYDROCHLORIDE 5 MG: 5 TABLET ORAL at 20:18

## 2023-01-10 RX ADMIN — OXYCODONE HYDROCHLORIDE 5 MG: 5 TABLET ORAL at 14:27

## 2023-01-10 RX ADMIN — AZITHROMYCIN MONOHYDRATE 500 MG: 500 INJECTION, POWDER, LYOPHILIZED, FOR SOLUTION INTRAVENOUS at 12:18

## 2023-01-10 RX ADMIN — ACETAMINOPHEN 975 MG: 325 TABLET, FILM COATED ORAL at 13:40

## 2023-01-10 RX ADMIN — FUROSEMIDE 20 MG: 20 TABLET ORAL at 13:40

## 2023-01-10 RX ADMIN — ACETAMINOPHEN 975 MG: 325 TABLET, FILM COATED ORAL at 09:14

## 2023-01-10 RX ADMIN — FUROSEMIDE 20 MG: 20 TABLET ORAL at 06:15

## 2023-01-10 ASSESSMENT — ACTIVITIES OF DAILY LIVING (ADL)
ADLS_ACUITY_SCORE: 26
ADLS_ACUITY_SCORE: 26
ADLS_ACUITY_SCORE: 24
ADLS_ACUITY_SCORE: 26
ADLS_ACUITY_SCORE: 26
ADLS_ACUITY_SCORE: 24
ADLS_ACUITY_SCORE: 26
ADLS_ACUITY_SCORE: 24
ADLS_ACUITY_SCORE: 24
DEPENDENT_IADLS:: CLEANING;COOKING;LAUNDRY;SHOPPING;MEAL PREPARATION;TRANSPORTATION
ADLS_ACUITY_SCORE: 24
ADLS_ACUITY_SCORE: 24
ADLS_ACUITY_SCORE: 26

## 2023-01-10 NOTE — PROGRESS NOTES
01/10/23 1522   Appointment Info   Signing Clinician's Name / Credentials (PT) Henna Pantoja DPT   Living Environment   People in Home spouse   Current Living Arrangements house   Home Accessibility stairs to enter home   Number of Stairs, Main Entrance 1   Stair Railings, Main Entrance none   Transportation Anticipated family or friend will provide   Self-Care   Usual Activity Tolerance moderate   Current Activity Tolerance fair   Equipment Currently Used at Home walker, rolling   Fall history within last six months yes   Number of times patient has fallen within last six months 1   Activity/Exercise/Self-Care Comment Pt IND at baseline, has been using 4WW   General Information   Onset of Illness/Injury or Date of Surgery 01/09/23   Referring Physician Golden Logan MD   Patient/Family Therapy Goals Statement (PT) Return home   Pertinent History of Current Problem (include personal factors and/or comorbidities that impact the POC) Harrison Thomas is a 77 year old male admitted on 1/9/2023. He presents with Fall, Shortness of Breath, Chest Pain, and Cough   Existing Precautions/Restrictions fall;oxygen therapy device and L/min  (3L per min)   General Observations Pt supine in bed upon therapist arrival, agreeable to PT   Cognition   Affect/Mental Status (Cognition) WNL   Orientation Status (Cognition) oriented x 4   Follows Commands (Cognition) WFL   Pain Assessment   Patient Currently in Pain No   Integumentary/Edema   Integumentary/Edema no deficits were identifed   Posture    Posture Forward head position;Protracted shoulders   Range of Motion (ROM)   Range of Motion ROM is WFL   Strength (Manual Muscle Testing)   Strength (Manual Muscle Testing) Deficits observed during functional mobility   Bed Mobility   Comment, (Bed Mobility) Supine to sit IND   Transfers   Comment, (Transfers) Sit to stand SBA   Gait/Stairs (Locomotion)   Comment, (Gait/Stairs) Pt amb w/ FWW and SBA   Balance   Balance Comments Fair  seated and standing   Sensory Examination   Sensory Perception patient reports no sensory changes   Clinical Impression   Criteria for Skilled Therapeutic Intervention Yes, treatment indicated   PT Diagnosis (PT) Impaired functional mobility and gait   Influenced by the following impairments Pain, weakness, decreased activity tolerance, SOB   Functional limitations due to impairments Limited functional mobility requiring AD and assist   Clinical Presentation (PT Evaluation Complexity) Stable/Uncomplicated   Clinical Presentation Rationale Based on PMH, current status, and social support   Clinical Decision Making (Complexity) low complexity   Planned Therapy Interventions (PT) balance training;bed mobility training;gait training;patient/family education;stair training;strengthening;transfer training   Risk & Benefits of therapy have been explained evaluation/treatment results reviewed;care plan/treatment goals reviewed;risks/benefits reviewed;current/potential barriers reviewed;participants voiced agreement with care plan;participants included;patient   PT Total Evaluation Time   PT Eval, Low Complexity Minutes (79413) 10   Physical Therapy Goals   PT Frequency Daily   PT Predicted Duration/Target Date for Goal Attainment 01/17/23   PT Goals Bed Mobility;Transfers;Gait;Stairs   PT: Bed Mobility Modified independent;Supine to/from sit;Goal Met   PT: Transfers Modified independent;Sit to/from stand;Assistive device   PT: Gait Modified independent;Assistive device;100 feet   PT: Stairs Supervision/stand-by assist;1 stair   Interventions   Interventions Quick Adds Therapeutic Activity   Therapeutic Activity   Therapeutic Activities: dynamic activities to improve functional performance Minutes (48369) 11   Symptoms Noted During/After Treatment Shortness of breath;Increased pain   Treatment Detail/Skilled Intervention Pt on 3L O2 throughout session, SpO2 91% at beginning of session. Pt refused gait belt. Pt amb 90' around  room w/ FWW and SBA. Pt demos slow speed and fair stability, forward flexed posture. Pt performed sit <> stand from toilet Mod I. Pt able to void and perform pericares IND. Pt stood at sink to wash hands. Pt sat EOB, reported increased pain and mild SOB. Pt returned to supine IND. SpO2 85%, pt required 3 minutes w/ cues for PLB for SpO2 to bump up to 91%. Pt supine in bed at end of session, pt refused bed alarm (updated RN), all needs w/in reach, on 3L O2, RN updated.   PT Discharge Planning   PT Plan Progress amb, trial platform stair, monitor O2   PT Discharge Recommendation (DC Rec) home with assist;home with outpatient physical therapy   PT Rationale for DC Rec Pt is below baseline, currently SBA for mobility. Pt lives w/ his wife who can provide assist. Anticipate w/ further IP PT, pt will meet goals and be safe to d/c home w/ assist. Pt would benefit from OP PT to increase strength and balance   PT Brief overview of current status SBA   Total Session Time   Timed Code Treatment Minutes 11   Total Session Time (sum of timed and untimed services) 21

## 2023-01-10 NOTE — PLAN OF CARE
Orientation/Cognitive: AO X4  Observation Goals (Met/ Not Met): Not Met  Mobility Level/Assist Equipment: A1 GB W  Fall Risk (Y/N): Y  Behavior Concerns: None  Pain Management: Oxycodone po given x2 for Jone hip pain, scheduled tylenol given  Tele/VS/O2: VSS on 4L NC sats 92-93%  ABNL Lab/BG: CT Chest  Acute, minimally displaced left eighth rib fracture, see result  Diet: Regular   Bowel/Bladder: Continent  Skin Concerns: None  Drains/Devices: PIV SL  Tests/Procedures for next shift: PT consult and Home Oxygen Assessment at 0800  Anticipated DC date & active delays: TBD  Patient Stated Goal for Today: Feel better    Observation goals  PRIOR TO DISCHARGE       Comments:   -diagnostic tests and consults completed and resulted Not Met  -vital signs normal or at patient baseline Not Met  -tolerating oral intake to maintain hydration Met  -adequate pain control on oral analgesics Partially Met  Nurse to notify provider when observation goals have been met and patient is ready for discharge.

## 2023-01-10 NOTE — PLAN OF CARE
Observation goals  PRIOR TO DISCHARGE       Comments:   -diagnostic tests and consults completed and resulted Not Met  -vital signs normal or at patient baseline Not Met  -tolerating oral intake to maintain hydration Met  -adequate pain control on oral analgesics Not met  Nurse to notify provider when observation goals have been met and patient is ready for discharge.

## 2023-01-10 NOTE — PROGRESS NOTES
Observation goals  PRIOR TO DISCHARGE        Comments: -diagnostic tests and consults completed and resulted - not met  -vital signs normal or at patient baseline - partially met  -tolerating oral intake to maintain hydration - met  -adequate pain control on oral analgesics - met  Nurse to notify provider when observation goals have been met and patient is ready for discharge.

## 2023-01-10 NOTE — PROGRESS NOTES
[facility]    Patient is currently receiving services with [facility]. The patient is currently receiving RN and PT services.  Patient's  and home health team have been notified that patient is under observation status. [facility] Liaison will continue to follow patient during stay. If patient is admitted to inpatient status, please provide orders to resume home care at time of discharge if appropriate.

## 2023-01-10 NOTE — CONSULTS
Care Management Initial Consult    General Information  Assessment completed with: Harrison Strickland  Type of CM/SW Visit: Initial Assessment    Primary Care Provider verified and updated as needed: Yes , Dr. Dozier    Readmission within the last 30 days: no previous admission in last 30 days      Reason for Consult: discharge planning  Advance Care Planning:    None noted        Communication Assessment  Patient's communication style: spoken language (English or Bilingual)    Hearing Difficulty or Deaf: no   Wear Glasses or Blind: yes    Cognitive  Cognitive/Neuro/Behavioral: WDL                      Living Environment:   People in home: spouse     Current living Arrangements: house      Able to return to prior arrangements: yes       Family/Social Support:  Care provided by: self, spouse/significant other, homecare agency  Provides care for:    Marital Status:              Description of Support System: supportive          Current Resources:   Patient receiving home care services: Yes, [facility]   Skilled nursing, per notes PT as well     Community Resources: None  Equipment currently used at home: cane, quad  Supplies currently used at home: Oxygen Tubing/Supplies-baseline of 3 liters at night  -Patient reports through [facility]    Employment/Financial:  Employment Status: retired        Financial Concerns: No concerns identified           Lifestyle & Psychosocial Needs:  Social Determinants of Health     Tobacco Use: Medium Risk     Smoking Tobacco Use: Former     Smokeless Tobacco Use: Never     Passive Exposure: Not on file   Alcohol Use: Not on file   Financial Resource Strain: Not on file   Food Insecurity: Not on file   Transportation Needs: Not on file   Physical Activity: Not on file   Stress: Not on file   Social Connections: Not on file   Intimate Partner Violence: Not on file   Depression: Not at risk     PHQ-2 Score: 2   Housing Stability: Not on file       Functional Status:  Prior to  admission patient needed assistance:   Dependent ADLs:: Ambulation-cane  Dependent IADLs:: Cleaning, Cooking, Laundry, Shopping, Meal Preparation, Transportation       Mental Health Status:  Mental Health Status: No Current Concerns       Chemical Dependency Status:      None noted          Values/Beliefs:  Spiritual, Cultural Beliefs, Anabaptism Practices, Values that affect care: no               Additional Information:  Met with patient. Went over Medicare Outpatient Observation Notice. Patient has no questions about this. Patient told writer that he does get home care through [facility]. Patient is having pain from his rib fracture and was given a pain pill. He would like to rest now but was very polite to writer. Will follow for discharge planning.    Nelda Lynn RN

## 2023-01-10 NOTE — PLAN OF CARE
Summary: SOB, fall, left 8th rib fracture  Date & Time: 1/10/23 0087-5386  Orientation: A&Ox4  Activity Level: Assist of 1 with a GB/W, SOB with exertion     Fall Risk: Yes  Behavior & Aggression: Green  Pain Management: Some pain relieved with oxy, lidocaine, ice, and tylenol   ABNL VS/O2: VSS on 3L O2, baseline is around 93%  ABNL Lab/BG: K+ 3.3  Diet: Regular   Bowel/Bladder: Continent      Drains/Devices: IV SL  Skin: Bruises below ribs  Tests/Procedures: PT consulted  Anticipated  DC Date: Pending on pain control, pt consult  Other Important Info: ABX every 24hr, consulted RT (recommended doing 3 nebs a day), CT showed consolidation that could be pneumonia

## 2023-01-10 NOTE — PROGRESS NOTES
[facility]    Medicine Progress Note - Hospitalist Service    Date of Admission:  1/9/2023    Assessment & Plan   Harrison Thomas is a 77 year old male admitted on 1/9/2023. He presents with Fall, Shortness of Breath, Chest Pain, and Cough.     Closed fracture of L 8th rib s/p mechanical fall   Acute hypoxic respiratory failure  Severe COPD (chronic obstructive pulmonary disease) (H)  Pt with complex pulmonary hx severe COPD as well as abnormal chest imaging s/p incomplete attempted VATS and radiation for thyroid CA with potential radiation pneumonitis.   Follow closely with Pulmonology. Last visit 12/30 at which time he reported maintaining sats 91-92% on RA at rest at 88% with activity. He was just completing a prednisone taper after being treated for subacute respiratory failure early December with CRP >90 and neg Histo, Blasto, Legionella, Strep and Aspergillus testing. CT at the time showed masslike appearance RUL. Bronch considered to definitively rule out infection particularly as he was not on PJP prophylaxis with high dose prednisone course.   Has NOT required any home oxygen since 12/17/23.   Presents after a fall at home striking chest on chair.   *CT chest shows -> Acute, minimally displaced left eighth rib fracture. No underlying pleural effusion or pneumothorax. Significantly increased consolidation in the right upper lobe in area of previously described radiation fibrosis, could represent pneumonia but recurrent disease is also possible.  Significant interval enlargement of mediastinal lymph nodes, likely related to #2. Likely mild interstitial edema.    *WBC is within normal limits though he reports worsening cough with increasing productivity  * procal 0.32,  (normal 12/28/22)  Suspect his current hypoxia and dyspnea secondary to rib fracture in the setting of complex COPD/lung cancer scenario / radiation pneumonitis with possible early pneumonia   Continues to  require 3L supplemental oxygen  --Pulmonology consulted, appreciate assistance  --start CAP treatment with Rocephin and azithromycin 1/10  -Supplemental oxygen as needed  -pain control as needed for rib pain, increased frequency of PTA oxycodone 1/10. Lidocaine patch  -Physical therapy evaluation  - aggressive pulmonary hygiene, will ask RCAT to follow as well  --uses albterol nebs at home regularly, will schedule four times daily for now   -Oximetry  -Continue prior to admission trilogy  -Continue PTA Lasix twice daily  --mobilize as much as possible      Lung cancer  Follows at [facility] for his combination of severe COPD with emphysema and abnormal chest imaging with incomplete at attempted VATS lung biopsy and radiation for possible thyroid or lung cancer with possible radiation pneumonitis.  *unfortuntely was due to see Dr. Dumont 1/10, rescheduled for Monday      CAD with hx Cx stenting 1997  HTN  HLD  [amlodipine 5 mg daily, aspirin 81 mg daily, furosemide 20 mg daily, lisinopril 40 mg daily, metoprolol XL 50 mg day, rosuvastatin 10 mg daily]  Plan:  - Continue all PTA meds      Metastatic papillary thyroid cancer  Hypothyroidism  S/p total thyroidectomy 8/2021 with 4/4 LN positive. Treated with radioactive iodine ablation 9/2021.   - resume levothyroxine 137 mcg daily        BRENNEN (obstructive sleep apnea)  Continue to follow with pulmonology       Rheumatoid arthritis (H)   Follows with rheumatology. Not currently on medications for this       Diet: Regular Diet Adult    DVT Prophylaxis: Pneumatic Compression Devices  Mir Catheter: Not present  Lines: None     Cardiac Monitoring: None  Code Status: No CPR- Do NOT Intubate      Clinically Significant Risk Factors Present on Admission                # Thrombocytopenia: Lowest platelets = 116 in last 2 days, will monitor for bleeding        # Overweight: Estimated body mass index is 28.15 kg/m   "as calculated from the following:    Height as of this encounter: 1.626 m (5' 4\").    Weight as of this encounter: 74.4 kg (164 lb).           Disposition Plan    2+ days. Needs ongoing acute supplemental oxygen. Escalating treatment plan and involving Pulm. Recommend transition to inpatient.        Expected Discharge Date: 01/10/2023                The patient's care was discussed with Dr. Bergman who agrees with the above plan     Rachel Briscoe PA-C  Hospitalist Service  [facility]  Securely message with [employer] (more info)  Text page via [facility] Paging/Directory   ______________________________________________________________________    Interval History   Reports ongoing rib pain. Feels short of breath, worse than his baseline since fall. Increased cough from baseline with more sputum production than typical. No anterior chest pain. No fevers. Appetite is ok.     Daughter preset at bedside.     Physical Exam   Temp: 97.6  F (36.4  C) Temp src: Oral BP: 118/61 Pulse: 73   Resp: 18 SpO2: 93 % O2 Device: Nasal cannula Oxygen Delivery: 3 LPM  Vitals:    01/09/23 1936   Weight: 74.4 kg (164 lb)     Vital Signs with Ranges  Temp:  [97  F (36.1  C)-98.3  F (36.8  C)] 97.6  F (36.4  C)  Pulse:  [66-91] 73  Resp:  [18-38] 18  BP: (117-143)/(48-85) 118/61  SpO2:  [92 %-96 %] 93 %  No intake/output data recorded.    Constitutional: Alert and oriented, sitting up in bed. Appears uncomfortable at times secondary to rib pain   ENT:  moist mucous membranes  Eyes:  Sclera anicteric, EOMI  Respiratory: Lungs with decreased air movement throughout. No significant wheezing or crackles. No increased work of breathing. intermittent productive cough   Cardiovascular: Regular rate and rhythm  GI:  active bowel sounds, abdomen soft, non-tender  MSK:  Moves all four extremities. Normal tone  Neuro: speech is clear. Face symmetric. Follows commands     Medical Decision Making       75 MINUTES SPENT BY ME on the " date of service doing chart review, history, exam, documentation & further activities per the note.  Medical complexity over the past 24 hours:  - Prescription DRUG MANAGEMENT performed   outpatient pulm notes reviewed.     Data     I have personally reviewed the following data over the past 24 hrs:    7.7  \   12.0 (L)   / 117 (L)     137 101 13 /  77   3.3 (L) 28 0.90 \       Procal: 0.39 (H) CRP: 181.0 (H) Lactic Acid: N/A         Imaging results reviewed over the past 24 hrs:   No results found for this or any previous visit (from the past 24 hour(s)).

## 2023-01-10 NOTE — PLAN OF CARE
Observation goals  PRIOR TO DISCHARGE       Comments:   -diagnostic tests and consults completed and resulted Not Met  -vital signs normal or at patient baseline Not Met  -tolerating oral intake to maintain hydration Met  -adequate pain control on oral analgesics Partially Met  Nurse to notify provider when observation goals have been met and patient is ready for discharge.

## 2023-01-11 ENCOUNTER — APPOINTMENT (OUTPATIENT)
Dept: PHYSICAL THERAPY | Facility: CLINIC | Age: 78
DRG: 205 | End: 2023-01-11
Payer: MEDICARE

## 2023-01-11 PROBLEM — J96.21 ACUTE ON CHRONIC RESPIRATORY FAILURE WITH HYPOXIA (H): Status: ACTIVE | Noted: 2023-01-11

## 2023-01-11 PROBLEM — S22.32XA CLOSED FRACTURE OF ONE RIB OF LEFT SIDE, INITIAL ENCOUNTER: Status: ACTIVE | Noted: 2023-01-11

## 2023-01-11 LAB
ANION GAP SERPL CALCULATED.3IONS-SCNC: 9 MMOL/L (ref 3–14)
BUN SERPL-MCNC: 13 MG/DL (ref 7–30)
CALCIUM SERPL-MCNC: 8.7 MG/DL (ref 8.5–10.1)
CHLORIDE BLD-SCNC: 104 MMOL/L (ref 94–109)
CO2 SERPL-SCNC: 26 MMOL/L (ref 20–32)
CREAT SERPL-MCNC: 0.73 MG/DL (ref 0.66–1.25)
ERYTHROCYTE [DISTWIDTH] IN BLOOD BY AUTOMATED COUNT: 16.9 % (ref 10–15)
GFR SERPL CREATININE-BSD FRML MDRD: >90 ML/MIN/1.73M2
GLUCOSE BLD-MCNC: 80 MG/DL (ref 70–99)
HCT VFR BLD AUTO: 37.6 % (ref 40–53)
HGB BLD-MCNC: 12.3 G/DL (ref 13.3–17.7)
MCH RBC QN AUTO: 28.9 PG (ref 26.5–33)
MCHC RBC AUTO-ENTMCNC: 32.7 G/DL (ref 31.5–36.5)
MCV RBC AUTO: 88 FL (ref 78–100)
PLATELET # BLD AUTO: 108 10E3/UL (ref 150–450)
POTASSIUM BLD-SCNC: 3.4 MMOL/L (ref 3.4–5.3)
RBC # BLD AUTO: 4.26 10E6/UL (ref 4.4–5.9)
SODIUM SERPL-SCNC: 139 MMOL/L (ref 133–144)
WBC # BLD AUTO: 7.5 10E3/UL (ref 4–11)

## 2023-01-11 PROCEDURE — 97116 GAIT TRAINING THERAPY: CPT | Mod: GP | Performed by: PHYSICAL THERAPIST

## 2023-01-11 PROCEDURE — 94640 AIRWAY INHALATION TREATMENT: CPT

## 2023-01-11 PROCEDURE — 96372 THER/PROPH/DIAG INJ SC/IM: CPT | Performed by: INTERNAL MEDICINE

## 2023-01-11 PROCEDURE — 94640 AIRWAY INHALATION TREATMENT: CPT | Mod: 76

## 2023-01-11 PROCEDURE — 250N000011 HC RX IP 250 OP 636: Performed by: INTERNAL MEDICINE

## 2023-01-11 PROCEDURE — 250N000013 HC RX MED GY IP 250 OP 250 PS 637: Performed by: STUDENT IN AN ORGANIZED HEALTH CARE EDUCATION/TRAINING PROGRAM

## 2023-01-11 PROCEDURE — 99233 SBSQ HOSP IP/OBS HIGH 50: CPT | Performed by: INTERNAL MEDICINE

## 2023-01-11 PROCEDURE — 36415 COLL VENOUS BLD VENIPUNCTURE: CPT | Performed by: PHYSICIAN ASSISTANT

## 2023-01-11 PROCEDURE — 999N000157 HC STATISTIC RCP TIME EA 10 MIN: Mod: 76

## 2023-01-11 PROCEDURE — G0378 HOSPITAL OBSERVATION PER HR: HCPCS

## 2023-01-11 PROCEDURE — 250N000013 HC RX MED GY IP 250 OP 250 PS 637: Performed by: PHYSICIAN ASSISTANT

## 2023-01-11 PROCEDURE — 999N000105 HC STATISTIC NO DOCUMENTATION TO SUPPORT CHARGE

## 2023-01-11 PROCEDURE — 96376 TX/PRO/DX INJ SAME DRUG ADON: CPT

## 2023-01-11 PROCEDURE — 120N000001 HC R&B MED SURG/OB

## 2023-01-11 PROCEDURE — 80048 BASIC METABOLIC PNL TOTAL CA: CPT | Performed by: PHYSICIAN ASSISTANT

## 2023-01-11 PROCEDURE — 85027 COMPLETE CBC AUTOMATED: CPT | Performed by: PHYSICIAN ASSISTANT

## 2023-01-11 PROCEDURE — 250N000013 HC RX MED GY IP 250 OP 250 PS 637: Performed by: INTERNAL MEDICINE

## 2023-01-11 PROCEDURE — 250N000009 HC RX 250: Performed by: PHYSICIAN ASSISTANT

## 2023-01-11 RX ORDER — ENOXAPARIN SODIUM 100 MG/ML
40 INJECTION SUBCUTANEOUS EVERY 24 HOURS
Status: DISCONTINUED | OUTPATIENT
Start: 2023-01-11 | End: 2023-01-14 | Stop reason: HOSPADM

## 2023-01-11 RX ORDER — AZITHROMYCIN 250 MG/1
250 TABLET, FILM COATED ORAL DAILY
Status: DISCONTINUED | OUTPATIENT
Start: 2023-01-11 | End: 2023-01-14 | Stop reason: HOSPADM

## 2023-01-11 RX ORDER — CEFTRIAXONE 2 G/1
2 INJECTION, POWDER, FOR SOLUTION INTRAMUSCULAR; INTRAVENOUS EVERY 24 HOURS
Status: DISCONTINUED | OUTPATIENT
Start: 2023-01-11 | End: 2023-01-14 | Stop reason: HOSPADM

## 2023-01-11 RX ORDER — HYDROXYZINE HYDROCHLORIDE 25 MG/1
50 TABLET, FILM COATED ORAL EVERY 6 HOURS PRN
Status: DISCONTINUED | OUTPATIENT
Start: 2023-01-11 | End: 2023-01-14 | Stop reason: HOSPADM

## 2023-01-11 RX ORDER — HYDROXYZINE HYDROCHLORIDE 25 MG/1
25 TABLET, FILM COATED ORAL EVERY 6 HOURS PRN
Status: DISCONTINUED | OUTPATIENT
Start: 2023-01-11 | End: 2023-01-14 | Stop reason: HOSPADM

## 2023-01-11 RX ADMIN — CEFTRIAXONE SODIUM 2 G: 2 INJECTION, POWDER, FOR SOLUTION INTRAMUSCULAR; INTRAVENOUS at 12:43

## 2023-01-11 RX ADMIN — OXYCODONE HYDROCHLORIDE 5 MG: 5 TABLET ORAL at 04:28

## 2023-01-11 RX ADMIN — ALBUTEROL SULFATE 1.25 MG: 2.5 SOLUTION RESPIRATORY (INHALATION) at 11:17

## 2023-01-11 RX ADMIN — ROSUVASTATIN CALCIUM 10 MG: 10 TABLET, FILM COATED ORAL at 19:42

## 2023-01-11 RX ADMIN — ENOXAPARIN SODIUM 40 MG: 40 INJECTION SUBCUTANEOUS at 12:45

## 2023-01-11 RX ADMIN — OXYCODONE HYDROCHLORIDE 5 MG: 5 TABLET ORAL at 08:13

## 2023-01-11 RX ADMIN — ACETAMINOPHEN 975 MG: 325 TABLET, FILM COATED ORAL at 22:23

## 2023-01-11 RX ADMIN — AMLODIPINE BESYLATE 5 MG: 5 TABLET ORAL at 08:13

## 2023-01-11 RX ADMIN — OXYCODONE HYDROCHLORIDE 5 MG: 5 TABLET ORAL at 12:50

## 2023-01-11 RX ADMIN — METOPROLOL SUCCINATE 50 MG: 50 TABLET, EXTENDED RELEASE ORAL at 08:13

## 2023-01-11 RX ADMIN — ACETAMINOPHEN 975 MG: 325 TABLET, FILM COATED ORAL at 15:04

## 2023-01-11 RX ADMIN — FUROSEMIDE 20 MG: 20 TABLET ORAL at 06:40

## 2023-01-11 RX ADMIN — OXYCODONE HYDROCHLORIDE 5 MG: 5 TABLET ORAL at 00:09

## 2023-01-11 RX ADMIN — HYDROXYZINE HYDROCHLORIDE 25 MG: 25 TABLET, FILM COATED ORAL at 22:23

## 2023-01-11 RX ADMIN — DULOXETINE HYDROCHLORIDE 20 MG: 20 CAPSULE, DELAYED RELEASE ORAL at 19:42

## 2023-01-11 RX ADMIN — AZITHROMYCIN MONOHYDRATE 250 MG: 250 TABLET ORAL at 12:44

## 2023-01-11 RX ADMIN — ALBUTEROL SULFATE 1.25 MG: 2.5 SOLUTION RESPIRATORY (INHALATION) at 19:44

## 2023-01-11 RX ADMIN — ASPIRIN 81 MG CHEWABLE TABLET 81 MG: 81 TABLET CHEWABLE at 08:12

## 2023-01-11 RX ADMIN — ASPIRIN 81 MG CHEWABLE TABLET 81 MG: 81 TABLET CHEWABLE at 19:42

## 2023-01-11 RX ADMIN — ALBUTEROL SULFATE 1.25 MG: 2.5 SOLUTION RESPIRATORY (INHALATION) at 15:58

## 2023-01-11 RX ADMIN — LIDOCAINE 1 PATCH: 560 PATCH PERCUTANEOUS; TOPICAL; TRANSDERMAL at 08:14

## 2023-01-11 RX ADMIN — ALBUTEROL SULFATE 1.25 MG: 2.5 SOLUTION RESPIRATORY (INHALATION) at 08:01

## 2023-01-11 RX ADMIN — FUROSEMIDE 20 MG: 20 TABLET ORAL at 12:49

## 2023-01-11 RX ADMIN — LEVOTHYROXINE SODIUM 137 MCG: 137 TABLET ORAL at 08:13

## 2023-01-11 RX ADMIN — ACETAMINOPHEN 975 MG: 325 TABLET, FILM COATED ORAL at 08:13

## 2023-01-11 RX ADMIN — DULOXETINE HYDROCHLORIDE 20 MG: 20 CAPSULE, DELAYED RELEASE ORAL at 08:13

## 2023-01-11 RX ADMIN — OXYCODONE HYDROCHLORIDE 5 MG: 5 TABLET ORAL at 22:23

## 2023-01-11 RX ADMIN — OXYCODONE HYDROCHLORIDE 5 MG: 5 TABLET ORAL at 17:47

## 2023-01-11 ASSESSMENT — ACTIVITIES OF DAILY LIVING (ADL)
ADLS_ACUITY_SCORE: 30
ADLS_ACUITY_SCORE: 26
ADLS_ACUITY_SCORE: 30
ADLS_ACUITY_SCORE: 30
ADLS_ACUITY_SCORE: 26
ADLS_ACUITY_SCORE: 30
ADLS_ACUITY_SCORE: 26
ADLS_ACUITY_SCORE: 26
ADLS_ACUITY_SCORE: 30

## 2023-01-11 NOTE — PROGRESS NOTES
Observation goals  PRIOR TO DISCHARGE        Comments: -diagnostic tests and consults completed and resulted - not met  -vital signs normal or at patient baseline - partially met  -tolerating oral intake to maintain hydration - met  -adequate pain control on oral analgesics - met  Nurse to notify provider when observation goals have been met and patient is ready for discharge.     Orientation/Cognitive: AOX4.   Observation Goals (Met/ Not Met): not met  Mobility Level/Assist Equipment: AX1 gb/walker. SOB with exertion.  Fall Risk (Y/N): Yes  Behavior Concerns: None  Pain Management: Scheduled tylenol & prn oxy.  Tele/VS/O2: VSS on 3L NC at baseline. Sats at 90-92%. Scheduled neb. Encourage IS.  ABNL Lab/BG: K+3.3. replaced in AM. CRP infla- 181. Procal- 0.39.   Diet: Reg  Bowel/Bladder: Continent. Up to bathroom.  Skin Concerns: Scattered bruised at L ribs and bilateral lower arms.  Drains/Devices: PIV SL.  Tests/Procedures for next shift: AM labs.  Anticipated DC date & active delays: TBD. PT recommending Home w/assist, outpatient PT.  Patient Stated Goal for Today: feel better.

## 2023-01-11 NOTE — PROGRESS NOTES
Pt refusing bed/chair alarms. Safety precautions explained. Pt demonstrated walking to BR and up in room w/ walker. Pt room near nursing station w/ window shades open, good visualization. Will continue to monitor closely. Pt currently up in chair.

## 2023-01-11 NOTE — PROGRESS NOTES
"Ambulated in hallway ~200ft. on 2 LPM O2 SBA w/ walker ACEVES, but \"no more than usual\" per pt. Was 81% when we got back to room and pt sat in chair. Bumped up to 3LPM and pt recovered to 92-94% in 2-3 minutes.  After a few more minutes recovery put pt back on 2 LPM and he is satting 92-94%.   "

## 2023-01-11 NOTE — PROGRESS NOTES
[facility]    Medicine Progress Note - Hospitalist Service    Date of Admission:  1/9/2023    Assessment & Plan   Harrison Thomas is a 77 year old male admitted on 1/9/2023. He presents with Fall, Shortness of Breath, Chest Pain, and Cough.     Closed fracture of L 8th rib s/p mechanical fall   Acute hypoxic respiratory failure  Severe COPD (chronic obstructive pulmonary disease) (H)  Pt with complex pulmonary hx severe COPD as well as abnormal chest imaging s/p incomplete attempted VATS and radiation for thyroid CA with potential radiation pneumonitis.   Follow closely with Pulmonology. Last visit 12/30 at which time he reported maintaining sats 91-92% on RA at rest at 88% with activity. He was just completing a prednisone taper after being treated for subacute respiratory failure early December with CRP >90 and neg Histo, Blasto, Legionella, Strep and Aspergillus testing. CT at the time showed masslike appearance RUL. Bronch considered to definitively rule out infection particularly as he was not on PJP prophylaxis with high dose prednisone course.   Has NOT required any home oxygen since 12/17/23.   Presents after a fall at home striking chest on chair.   *CT chest shows -> Acute, minimally displaced left eighth rib fracture. No underlying pleural effusion or pneumothorax. Significantly increased consolidation in the right upper lobe in area of previously described radiation fibrosis, could represent pneumonia but recurrent disease is also possible. Significant interval enlargement of mediastinal lymph nodes, likely related to #2. Likely mild interstitial edema.    *WBC is within normal limits though he reports worsening cough with increasing productivity  * procal 0.32,  (normal 12/28/22)  Suspect his current hypoxia and dyspnea secondary to rib fracture in the setting of complex COPD/lung cancer scenario / radiation pneumonitis with possible early pneumonia   Continues to  require 3L supplemental oxygen  --A/W Pulmonology consult, appreciate assistance  --start CAP treatment with Rocephin and azithromycin 1/10  -Supplemental oxygen as needed  -pain control as needed for rib pain, increased frequency of PTA oxycodone 1/10. Lidocaine patch  -Physical therapy evaluation  - aggressive pulmonary hygiene, will ask RCAT to follow as well  --uses albterol nebs at home regularly, will schedule four times daily for now   -Oximetry  -Continue prior to admission trilogy  -Continue PTA Lasix twice daily  --mobilize as much as possible      Lung cancer  Follows at [facility] for his combination of severe COPD with emphysema and abnormal chest imaging with incomplete at attempted VATS lung biopsy and radiation for possible thyroid or lung cancer with possible radiation pneumonitis.  *unfortuntely was due to see Dr. Dumont 1/10, rescheduled for Monday   A/w Pulm review     CAD with hx Cx stenting 1997  HTN  HLD  [amlodipine 5 mg daily, aspirin 81 mg daily, furosemide 20 mg daily, lisinopril 40 mg daily, metoprolol XL 50 mg day, rosuvastatin 10 mg daily]  Plan:  - Continue all PTA meds      Metastatic papillary thyroid cancer  Hypothyroidism  S/p total thyroidectomy 8/2021 with 4/4 LN positive. Treated with radioactive iodine ablation 9/2021.   - ct levothyroxine 137 mcg daily        BRENNEN (obstructive sleep apnea)  Continue to follow with pulmonology       Rheumatoid arthritis (H)   Follows with rheumatology. Not currently on medications for this        Diet: Regular Diet Adult    DVT Prophylaxis: Enoxaparin (Lovenox) SQ  Mir Catheter: Not present  Lines: None     Cardiac Monitoring: None  Code Status: No CPR- Do NOT Intubate      Clinically Significant Risk Factors Present on Admission        # Hypokalemia: Lowest K = 3.3 mmol/L in last 2 days, will replace as needed         # Thrombocytopenia: Lowest platelets = 108 in last 2 days,  "will monitor for bleeding        # Overweight: Estimated body mass index is 27.67 kg/m  as calculated from the following:    Height as of this encounter: 1.626 m (5' 4\").    Weight as of this encounter: 73.1 kg (161 lb 3.2 oz).           Disposition Plan      Expected Discharge Date: 01/12/2023        Discharge Comments: PT   SW  Home O2 assessment.  pulm consulted        Abel Ugarte MD, MD  Hospitalist Service  [facility]  Securely message with [employer] (more info)  Text page via [facility] Paging/Directory     \"This dictation was performed with voice recognition software and may contain errors,  omissions and inadvertent word substitution.\"  ______________________________________________________________________    Interval History   Continues to feel SOB.   Rib pain  No fever     Physical Exam   /61 (BP Location: Left arm)   Pulse 75   Temp 98.2  F (36.8  C) (Oral)   Resp 18   Ht 1.626 m (5' 4\")   Wt 73.1 kg (161 lb 3.2 oz)   SpO2 91%   BMI 27.67 kg/m    Gen- pleasant   Neck- supple  CVS- I+II+ no m/r/g  RS- decreased a.e . Poor effort.  No gross wheeze   Abdo- soft, no tenderness.     Tender Left lower chest       Medical Decision Making       50 MINUTES SPENT BY ME on the date of service doing chart review, history, exam, documentation & further activities per the note.  MANAGEMENT DISCUSSED with the following over the past 24 hours: Patient, RN    NOTE(S)/MEDICAL RECORDS REVIEWED over the past 24 hours: interdisciplinary team   Tests ORDERED & REVIEWED in the past 24 hours:  Tests ORDERED in the past 24 hours:   Tests REVIEWED in the past 24 hours:  Tests personally interpreted in the past 24 hours:  - CHEST CT showing increased opacification RUL       Data   ------------------------- PAST 24 HR DATA REVIEWED -----------------------------------------------    I have personally reviewed the following data over the past 24 hrs:    7.5  \   12.3 (L)   / 108 (L)     139 104 13 /  " 80   3.4 26 0.73 \       Imaging results reviewed over the past 24 hrs:   No results found for this or any previous visit (from the past 24 hour(s)).     BMPRecent Labs   Lab 01/11/23  0659 01/10/23  0709 01/09/23  1235    137 136   POTASSIUM 3.4 3.3* 3.4   CHLORIDE 104 101 99   KARLIE 8.7 8.8 9.2   CO2 26 28 25   BUN 13 13 15   CR 0.73 0.90 0.80   GLC 80 77 110*     CBC  Recent Labs   Lab 01/11/23  0659 01/10/23  0709 01/09/23  1235   WBC 7.5 7.7 8.9   RBC 4.26* 4.06* 4.78   HGB 12.3* 12.0* 13.8   HCT 37.6* 35.8* 42.5   MCV 88 88 89   MCH 28.9 29.6 28.9   MCHC 32.7 33.5 32.5   RDW 16.9* 16.7* 16.4*   * 117* 116*     INRNo lab results found in last 7 days.  LFTsNo lab results found in last 7 days.   PANCNo lab results found in last 7 days.    CT Chest: IMPRESSION:   1.  Acute, minimally displaced left eighth rib fracture. No underlying   pleural effusion or pneumothorax.   2.  Significantly increased consolidation in the right upper lobe in   area of previously described radiation fibrosis, could represent   pneumonia but recurrent disease is also possible. Recommend   pulmonology referral if not previously performed.   3.  Significant interval enlargement of mediastinal lymph nodes,   likely related to #2.   4.  Likely mild interstitial edema.

## 2023-01-11 NOTE — UTILIZATION REVIEW
Admission Status; Secondary Review Determination    Under the authority of the Utilization Management Committee, the utilization review process indicated a secondary review on the above patient. The review outcome is based on review of the medical records, discussions with staff, and applying clinical experience noted on the date of the review.    (x) Inpatient Status Appropriate - This patient's medical care is consistent with medical management for inpatient care and reasonable inpatient medical practice.    RATIONALE FOR DETERMINATION:77-year-old male with history of CAD, COPD, lung cancer, hypertension who presented to the hospital with left-sided chest pain, pleurisy and increasing shortness of breath after a fall 5 days earlier.  Patient typically utilizes oxygen at night only but now has developed significant daytime hypoxemia.  Patient has tachypnea, hypoxemia and diminished breath sounds at bilateral bases.  CT imaging reveals an acute left eighth rib fracture as well as a significantly increased consolidation in the right upper lobe and an area previously described as radiation fibrosis but now could represent an acute pneumonia.  With patient's acute on chronic respiratory failure patient admitted to the hospital.  Patient has new worsening cough and with patient's significant increasing consolidation in the face of underlying COPD and ongoing hypoxemia with moderate elevated procalcitonin level patient is requiring acute treatment for pneumonia appropriate for inpatient care.  At the time of admission with the information available to the attending physician more than 2 nights Hospital complex care was anticipated, based on patient risk of adverse outcome if treated as outpatient and complex care required. Inpatient admission is appropriate based on the Medicare guidelines.    This document was produced using voice recognition software    The information on this document is developed by the  utilization review team in order for the business office to ensure compliance. This only denotes the appropriateness of proper admission status and does not reflect the quality of care rendered.    The definitions of Inpatient Status and Observation Status used in making the determination above are those provided in the CMS Coverage Manual, Chapter 1 and Chapter 6, section 70.4.    Sincerely,    Khari Ocasio MD  Utilization Review  Physician Advisor  [facility].
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
## 2023-01-11 NOTE — PLAN OF CARE
Summary:    Care Plan Summary Note:  Orientation: A&Ox4  Observation Goals (met & not met): not met  Activity Level: Ax1 GB/W SOB with exertion  Fall Risk: yes  Behavior & Aggression Tool Color: green, no concerns   Pain Management: PRN Oxy   ABNL VS/O2: VSS on 3L NC baseline  ABNL Lab/BG: , K+ 3.3 was replaced recheck this am. Hematocrit 35.8  Diet: reg  Bowel/Bladder: continent  Drains/Devices:   Tests/Procedures for next shift: AM labs   Anticipated DC date: TBD

## 2023-01-12 LAB
ANION GAP SERPL CALCULATED.3IONS-SCNC: 12 MMOL/L (ref 3–14)
BUN SERPL-MCNC: 12 MG/DL (ref 7–30)
CALCIUM SERPL-MCNC: 8.6 MG/DL (ref 8.5–10.1)
CHLORIDE BLD-SCNC: 102 MMOL/L (ref 94–109)
CO2 SERPL-SCNC: 25 MMOL/L (ref 20–32)
CREAT SERPL-MCNC: 0.76 MG/DL (ref 0.66–1.25)
ERYTHROCYTE [DISTWIDTH] IN BLOOD BY AUTOMATED COUNT: 16.6 % (ref 10–15)
GFR SERPL CREATININE-BSD FRML MDRD: >90 ML/MIN/1.73M2
GLUCOSE BLD-MCNC: 81 MG/DL (ref 70–99)
HCT VFR BLD AUTO: 38.1 % (ref 40–53)
HGB BLD-MCNC: 12.4 G/DL (ref 13.3–17.7)
MAGNESIUM SERPL-MCNC: 1.8 MG/DL (ref 1.6–2.3)
MCH RBC QN AUTO: 28.9 PG (ref 26.5–33)
MCHC RBC AUTO-ENTMCNC: 32.5 G/DL (ref 31.5–36.5)
MCV RBC AUTO: 89 FL (ref 78–100)
PLATELET # BLD AUTO: 105 10E3/UL (ref 150–450)
POTASSIUM BLD-SCNC: 2.9 MMOL/L (ref 3.4–5.3)
POTASSIUM BLD-SCNC: 3.1 MMOL/L (ref 3.4–5.3)
POTASSIUM BLD-SCNC: 3.7 MMOL/L (ref 3.4–5.3)
RBC # BLD AUTO: 4.29 10E6/UL (ref 4.4–5.9)
SODIUM SERPL-SCNC: 139 MMOL/L (ref 133–144)
WBC # BLD AUTO: 6.2 10E3/UL (ref 4–11)

## 2023-01-12 PROCEDURE — 250N000013 HC RX MED GY IP 250 OP 250 PS 637: Performed by: INTERNAL MEDICINE

## 2023-01-12 PROCEDURE — 94640 AIRWAY INHALATION TREATMENT: CPT | Mod: 76

## 2023-01-12 PROCEDURE — 120N000001 HC R&B MED SURG/OB

## 2023-01-12 PROCEDURE — 87070 CULTURE OTHR SPECIMN AEROBIC: CPT | Performed by: PHYSICIAN ASSISTANT

## 2023-01-12 PROCEDURE — 85027 COMPLETE CBC AUTOMATED: CPT | Performed by: INTERNAL MEDICINE

## 2023-01-12 PROCEDURE — 83735 ASSAY OF MAGNESIUM: CPT | Performed by: HOSPITALIST

## 2023-01-12 PROCEDURE — 250N000013 HC RX MED GY IP 250 OP 250 PS 637: Performed by: HOSPITALIST

## 2023-01-12 PROCEDURE — 36415 COLL VENOUS BLD VENIPUNCTURE: CPT | Performed by: HOSPITALIST

## 2023-01-12 PROCEDURE — 250N000013 HC RX MED GY IP 250 OP 250 PS 637: Performed by: PHYSICIAN ASSISTANT

## 2023-01-12 PROCEDURE — 84132 ASSAY OF SERUM POTASSIUM: CPT | Performed by: HOSPITALIST

## 2023-01-12 PROCEDURE — 94640 AIRWAY INHALATION TREATMENT: CPT

## 2023-01-12 PROCEDURE — 999N000157 HC STATISTIC RCP TIME EA 10 MIN

## 2023-01-12 PROCEDURE — 99233 SBSQ HOSP IP/OBS HIGH 50: CPT | Performed by: HOSPITALIST

## 2023-01-12 PROCEDURE — 80048 BASIC METABOLIC PNL TOTAL CA: CPT | Performed by: INTERNAL MEDICINE

## 2023-01-12 PROCEDURE — 250N000013 HC RX MED GY IP 250 OP 250 PS 637: Performed by: STUDENT IN AN ORGANIZED HEALTH CARE EDUCATION/TRAINING PROGRAM

## 2023-01-12 PROCEDURE — 250N000011 HC RX IP 250 OP 636: Performed by: INTERNAL MEDICINE

## 2023-01-12 PROCEDURE — 250N000009 HC RX 250: Performed by: PHYSICIAN ASSISTANT

## 2023-01-12 PROCEDURE — 36415 COLL VENOUS BLD VENIPUNCTURE: CPT | Performed by: INTERNAL MEDICINE

## 2023-01-12 PROCEDURE — 87205 SMEAR GRAM STAIN: CPT | Performed by: PHYSICIAN ASSISTANT

## 2023-01-12 RX ORDER — CYCLOBENZAPRINE HCL 5 MG
5 TABLET ORAL EVERY 8 HOURS PRN
Status: DISCONTINUED | OUTPATIENT
Start: 2023-01-12 | End: 2023-01-14 | Stop reason: HOSPADM

## 2023-01-12 RX ORDER — POTASSIUM CHLORIDE 1500 MG/1
40 TABLET, EXTENDED RELEASE ORAL ONCE
Status: COMPLETED | OUTPATIENT
Start: 2023-01-12 | End: 2023-01-12

## 2023-01-12 RX ORDER — POTASSIUM CHLORIDE 1500 MG/1
20 TABLET, EXTENDED RELEASE ORAL ONCE
Status: COMPLETED | OUTPATIENT
Start: 2023-01-12 | End: 2023-01-12

## 2023-01-12 RX ADMIN — POTASSIUM CHLORIDE 40 MEQ: 1500 TABLET, EXTENDED RELEASE ORAL at 08:25

## 2023-01-12 RX ADMIN — ACETAMINOPHEN 975 MG: 325 TABLET, FILM COATED ORAL at 18:40

## 2023-01-12 RX ADMIN — ALBUTEROL SULFATE 1.25 MG: 2.5 SOLUTION RESPIRATORY (INHALATION) at 05:48

## 2023-01-12 RX ADMIN — AZITHROMYCIN MONOHYDRATE 250 MG: 250 TABLET ORAL at 08:25

## 2023-01-12 RX ADMIN — POTASSIUM CHLORIDE 40 MEQ: 1500 TABLET, EXTENDED RELEASE ORAL at 17:21

## 2023-01-12 RX ADMIN — ENOXAPARIN SODIUM 40 MG: 40 INJECTION SUBCUTANEOUS at 12:30

## 2023-01-12 RX ADMIN — OXYCODONE HYDROCHLORIDE 5 MG: 5 TABLET ORAL at 02:59

## 2023-01-12 RX ADMIN — ALBUTEROL SULFATE 1.25 MG: 2.5 SOLUTION RESPIRATORY (INHALATION) at 12:11

## 2023-01-12 RX ADMIN — METOPROLOL SUCCINATE 50 MG: 50 TABLET, EXTENDED RELEASE ORAL at 08:24

## 2023-01-12 RX ADMIN — DULOXETINE HYDROCHLORIDE 20 MG: 20 CAPSULE, DELAYED RELEASE ORAL at 08:24

## 2023-01-12 RX ADMIN — AMLODIPINE BESYLATE 5 MG: 5 TABLET ORAL at 08:24

## 2023-01-12 RX ADMIN — OXYCODONE HYDROCHLORIDE 5 MG: 5 TABLET ORAL at 18:40

## 2023-01-12 RX ADMIN — OXYCODONE HYDROCHLORIDE 5 MG: 5 TABLET ORAL at 07:00

## 2023-01-12 RX ADMIN — ALBUTEROL SULFATE 1.25 MG: 2.5 SOLUTION RESPIRATORY (INHALATION) at 20:00

## 2023-01-12 RX ADMIN — ASPIRIN 81 MG CHEWABLE TABLET 81 MG: 81 TABLET CHEWABLE at 19:58

## 2023-01-12 RX ADMIN — ALBUTEROL SULFATE 1.25 MG: 2.5 SOLUTION RESPIRATORY (INHALATION) at 15:40

## 2023-01-12 RX ADMIN — ACETAMINOPHEN 975 MG: 325 TABLET, FILM COATED ORAL at 08:23

## 2023-01-12 RX ADMIN — DULOXETINE HYDROCHLORIDE 20 MG: 20 CAPSULE, DELAYED RELEASE ORAL at 19:58

## 2023-01-12 RX ADMIN — CEFTRIAXONE SODIUM 2 G: 2 INJECTION, POWDER, FOR SOLUTION INTRAMUSCULAR; INTRAVENOUS at 12:27

## 2023-01-12 RX ADMIN — LIDOCAINE 1 PATCH: 560 PATCH PERCUTANEOUS; TOPICAL; TRANSDERMAL at 08:27

## 2023-01-12 RX ADMIN — POTASSIUM CHLORIDE 20 MEQ: 1500 TABLET, EXTENDED RELEASE ORAL at 10:47

## 2023-01-12 RX ADMIN — ROSUVASTATIN CALCIUM 10 MG: 10 TABLET, FILM COATED ORAL at 19:58

## 2023-01-12 RX ADMIN — CYCLOBENZAPRINE 5 MG: 5 TABLET, FILM COATED ORAL at 22:56

## 2023-01-12 RX ADMIN — ASPIRIN 81 MG CHEWABLE TABLET 81 MG: 81 TABLET CHEWABLE at 08:24

## 2023-01-12 RX ADMIN — LEVOTHYROXINE SODIUM 137 MCG: 137 TABLET ORAL at 08:24

## 2023-01-12 RX ADMIN — FUROSEMIDE 20 MG: 20 TABLET ORAL at 12:26

## 2023-01-12 RX ADMIN — FUROSEMIDE 20 MG: 20 TABLET ORAL at 05:47

## 2023-01-12 RX ADMIN — OXYCODONE HYDROCHLORIDE 5 MG: 5 TABLET ORAL at 12:26

## 2023-01-12 RX ADMIN — OXYCODONE HYDROCHLORIDE 5 MG: 5 TABLET ORAL at 22:56

## 2023-01-12 ASSESSMENT — ACTIVITIES OF DAILY LIVING (ADL)
ADLS_ACUITY_SCORE: 26
ADLS_ACUITY_SCORE: 26
ADLS_ACUITY_SCORE: 30
ADLS_ACUITY_SCORE: 26
ADLS_ACUITY_SCORE: 30
ADLS_ACUITY_SCORE: 30
ADLS_ACUITY_SCORE: 26
ADLS_ACUITY_SCORE: 30
ADLS_ACUITY_SCORE: 30
ADLS_ACUITY_SCORE: 26

## 2023-01-12 NOTE — PROGRESS NOTES
8582-8123:  Inpatient status.    A&Ox4. VSS on 1 LPM, weaned from 3LPM, continue to wean as able. ACEVES, O2 sats mid 80s w/ ambulation; recovers within 2-3 minutes once stops moving. Aggressive IS and flutter encouraged; pt using each at minimum 10x/hr often times more. Infrequent cough, if productive respiratory sputum sample needs to be collected. Skin bruised, intact. Denies skin itchiness/irritation today. Ambulating frequently in room and up in chair 3 times today. Ambulated in hallway 3 times. Voiding w/o difficulty (on PO lasix). Danitza cardiac diet. K+ 2.9 this AM, replaced, 1408 recheck 3.1, replaced again, next recheck timed for 2200. PIV SL ex int antibiotics. PT rec home w/ OP PT. SW/CC following (pt already receives home cares). Pulmonology signed off. Continue to monitor.       ADDENDUM 1834: respiratory sputum sample collected and sent to lab; results pending

## 2023-01-12 NOTE — PROGRESS NOTES
Shift: 9619-2170  Orientation/Cognitive: AOx4  Observation Goals (Met/ Not Met): inpatient  Mobility Level/Assist Equipment: SBA walker  Fall Risk (Y/N): yes  Behavior Concerns: calm and cooperative  Pain Management: patient on left lateral chest of between 6-7/10 - has Tylenol 975mg TID; gave a total of Oxycodone 15mg.  Tele/VS/O2: vitally stable and on 2LPM of O2 via NC with O2 sat around 92-93%  ABNL Lab/BG: BMP and CBC c PC - pending collection  Diet: regular  Bowel/Bladder: continent bowel and bladder  Skin Concerns: scattered bruises on bilateral arm  Drains/Devices: PIV on right arm  Tests/Procedures for next shift: none  Anticipated DC date & active delays: TBD  Patient Stated Goal for Today: pain control    Patient was complaining of worsening itchiness of back. Has hx of itchiness per patient. Gave PRN Atarax 25mg, slight itchiness at the moment. Will continue to monitor.

## 2023-01-12 NOTE — PROVIDER NOTIFICATION
MD Notification    Notified Person: MD    Notified Person Name:       MIKEY PHILIP     Notification Date/Time: 1/11/23 2030    Notification Interaction: amcom    Purpose of Notification: [address]  Pt's complaining of itchiness on his back, he said, he has itchiness since before but just gotten worse. Can he have some anti-itchiness med. thanks Heydi RN *[id number]    Orders Received:    Comments:    
urinary symptoms

## 2023-01-12 NOTE — PROGRESS NOTES
"PULMONOLOGY PROGRESS NOTE    Date of Admission: 1/9/2023    CC/Reason for Hospital visit: hypoxemia  __________________________________________    ASSESSMENT / PLAN      Pulmonary Diagnoses:  Abnl CT/CXR R91.8  COPD J44.9  Hypoxemia R09.02  Lung mass R91.8  Pneumonia unspec J18.9  Radiation pneum J70.0  SOB R06.02     ASSESSMENT:  76 yo male with PMH COPD, RUL lesion s/p VATS resection attempt, empiric RT, CAD, CVA who presents for SOB.     Review of CT shows known chronic consolidative opacities thought RT related fibrotic changes on 12/30. On CT from 1/9, new large consolidation in the same area. Thus given sudden appearance, most consistent with pneumonia, however segmental atelectasis or sequela of trauma is also possible. Malignancy cannot be ruled out, although given rapid change over a week I think it is less likely. SOB and hypoxemia likely 2/2 to worsening of lung disease, such as it is, and relative splinting from rib fracture.     PLAN:   Repeat CT chest without contrast in 6 weeks  Agree with antibiotics for CAP for now  Duonebs PRN  Pain control per primary  Follow up with [facility] pulmonology    Will sign off, please page with questions.     Shiv Jean MD  Interventional Pulmonology  [facility]      ____________________________________________    SUBJECTIVE      No interval events. On 1.5 L oxygen today. Still has chest pain.      ROS: A Problem Pertinent review of systems was negative except for items noted in HPI.  Past Medical, Family, and Social/Substance History has been reviewed: No interval changes.  OBJECTIVE   Vital signs:  Temp: 98.6  F (37  C) Temp src: Oral BP: 106/60 Pulse: 90   Resp: 18 SpO2: 92 % O2 Device: Nasal cannula Oxygen Delivery: 1.5 LPM Height: 162.6 cm (5' 4\") Weight: 73.7 kg (162 lb 6.4 oz)  Estimated body mass index is 27.88 kg/m  as calculated from the following:    Height as of this encounter: 1.626 m (5' 4\").    Weight as of this encounter: 73.7 kg (162 lb 6.4 " oz).        I/O last 3 completed shifts:  In: 240 [P.O.:240]  Out: -       CONSTITUTIONAL/GENERAL APPEARANCE: Alert. No Apparent Distress.  PSYCHIATRIC: Pleasant and appropriate mood and affect. Oriented x 3.  EARS, NOSE,THROAT,MOUTH: External ears and nose overall normal. Normal oral mucosa.   NECK: Neck appearance normal. No neck masses and the thyroid is not enlarged.   RESPIRATORY: Non-labored effort.   CARDIOVASCULAR: Extremities with no edema.      LABORATORY ASSESSMENT    Arterial Blood Gas  Recent Labs   Lab 01/09/23  1235   O2PER 4     CBC  Recent Labs   Lab 01/12/23  0639 01/11/23  0659 01/10/23  0709 01/09/23  1235   WBC 6.2 7.5 7.7 8.9   RBC 4.29* 4.26* 4.06* 4.78   HGB 12.4* 12.3* 12.0* 13.8   HCT 38.1* 37.6* 35.8* 42.5   MCV 89 88 88 89   MCH 28.9 28.9 29.6 28.9   MCHC 32.5 32.7 33.5 32.5   RDW 16.6* 16.9* 16.7* 16.4*   * 108* 117* 116*     BMP  Recent Labs   Lab 01/12/23  0639 01/11/23  0659 01/10/23  0709 01/09/23  1235    139 137 136   POTASSIUM 2.9* 3.4 3.3* 3.4   CHLORIDE 102 104 101 99   KARLIE 8.6 8.7 8.8 9.2   CO2 25 26 28 25   BUN 12 13 13 15   CR 0.76 0.73 0.90 0.80   GLC 81 80 77 110*     INRNo lab results found in last 7 days.   BNPNo lab results found in last 7 days.  VENOUS BLOOD GASES  Recent Labs   Lab 01/09/23  1235   PHV 7.33   PCO2V 52*   PO2V 22*   HCO3V 27   ELLIE 0.4         Additional labs and/or comments:     IMAGING      Results for orders placed or performed during the hospital encounter of 01/09/23   Chest CT w/o contrast    Impression    IMPRESSION:   1.  Acute, minimally displaced left eighth rib fracture. No underlying  pleural effusion or pneumothorax.  2.  Significantly increased consolidation in the right upper lobe in  area of previously described radiation fibrosis, could represent  pneumonia but recurrent disease is also possible. Recommend  pulmonology referral if not previously performed.  3.  Significant interval enlargement of mediastinal lymph  nodes,  likely related to #2.  4.  Likely mild interstitial edema.    FERNANDEZ ARMANDO MD         SYSTEM ID:  [id number]       PFT & OTHER TESTING     PFT Latest Ref Rng & Units 12/30/2022   FVC L 2.63   FEV1 L 1.55   FVC% % 72   FEV1% % 56

## 2023-01-12 NOTE — PROGRESS NOTES
OBS GOALS    -diagnostic tests and consults completed and resulted: NOT MET    -vital signs normal or at patient baseline: NOT MET    -tolerating oral intake to maintain hydration: MET    -adequate pain control on oral analgesics: MET

## 2023-01-12 NOTE — PROGRESS NOTES
2698-6621:    A&Ox4. VSS on 2 LPM, weaned from 3LPM, continue to wean as able. ACEVES, O2 sats low-mid 80s w/ ambulation; recovers within 2-3 minutes once stops moving. Aggressive IS and flutter encouraged; pt using each at minimum 10x/hr often times more. Infrequent productive cough. Skin bruised, intact. Ambulating frequently in room and up in chair 3 times today. Ambulated in hallway 3 times and also worked on stairs w/ PT. Voiding w/o difficulty (on PO lasix). Danitza cardiac diet. PT and SW/CC following. Inpatient status. Pulmonology saw this evening, official recommendations pending. Continue to monitor.

## 2023-01-12 NOTE — PROGRESS NOTES
[facility]  Hospitalist Progress Note        Josiah Rodriguez MD   01/12/2023        Interval History:        - Still with some left rib pain/spasm  - Evaluated by pulmonology, suggested to continue antibiotics for likely CAP, repeat CT chest without contrast in 6 weeks  - On admission , pro calcitonin 0.39  - Currently on 3 L nasal cannula; afebrile, no leukocytosis  - K 2.9, will start K supplementation protocol, check Magnesium         Assessment and Plan:        Harrison Thomas is a 77 year old male admitted on 1/9/2023. He presents with mechanical fall, Shortness of Breath, Chest Pain, and Cough, noted with left 8th rib fracture.    He has a complex past medical history including thyroid CA (s/p thyroidectomy), severe COPD, CAD, HTN, DLD, RA, BRENNEN. He follows up with Dr. Dumont and Pulmonology for probable lung cancer with possible radiation pneumonitis. He has been noted with RUL mass like lesion (PET positive). He underwent VATS biopsy (02/2022) by Dr Dumont but was noted to be incomplete due to a lot of scar tissue. He subsequently underwent radiation therapy for enlarging right upper lobe density.     He was hospitalized in 10/2022 following a fall, noted with pneumonia and/or radiation pneumonitis. Received a prolonged steroid taper at that time. In early December, 2022 he was treated empirically with Ceftin for 7 days (for CT showing scattered micro nodules with concern for possible infection). Several workup for Histo, Blasto, Legionella, Strep and Aspergillus were negative. Last seen in pulmonology clinic on 12/30/2022 at which time he had completed the prolonged steroid taper and had been off Oxygen since 12/18/22.     Closed fracture of L 8th rib s/p mechanical fall   Acute on chronic hypoxic respiratory failure  probable community card pneumonia  Severe COPD/ emphysema  - patient with significant pulmonary history including severe COPD, right upper lobe density/mass,  h/o radiation pneumonitis and recent hospitalizations as noted (as summarized above, also discussed below)  - apparently had been off oxygen since 12/18/22  - this admission presented with mechanical fall at home striking chest on chair,  sustaining L 8th rib fracture  - CT chest 1/9/23 noted acute, minimally displaced left eighth rib fracture (no effusion or pneumothorax); also noted significantly increased consolidation in the RUL in  area of previously described radiation fibrosis, could represent pneumonia but recurrent disease is also possible; Significant interval enlargement of mediastinal lymph nodes; Likely mild interstitial edema  -hypoxic respiratory failure likely multifactorial due to rib fracture, probable pneumonia, also consult for likely radiation pneumonitis flare    - On admission , pro calcitonin 0.39  - Currently on 3 L nasal cannula; afebrile, no leukocytosis  - evaluated by pulmonology, given recent worsening in the RUL, suspect likely infectious process and rec continue antibiotics and repeat CT chest in 6 weeks  - will continue Rocephin and azithromycin  - PRN oxycodone, lidocaine patch for rib fracture; will also add Flexeril prn for spasm  - encourage incentive spirometry and wean oxygen as able; aggressive pulmonary hygiene  - continue albuterol nebs 4 times daily, Trelegy Ellipta    Abnormal CT chest with RUL density/mass dating back prior to 2/2022, h/o empiric radiation therapy  obstructive sleep apnea-- not using CPAP  H/o radiation pneumonitis  - as noted above, follows at [facility] for his combination of severe COPD with emphysema and abnormal chest imaging with incomplete at attempted VATS lung biopsy and radiation for possible thyroid or lung cancer with possible radiation pneumonitis.  - unfortuntely was due to see Dr. Dumont 1/10, rescheduled for Monday  - during last visit with pulmonology on 12/30/22 he  "was instructed to resume prednisone taper but patient currently not taking  - CT chest this admission as noted above  - evaluated by pulmonology (signed off 1/12/23), suggested to continue antibiotics for likely CAP, repeat CT chest without contrast in 6 weeks     CAD with hx Cx stenting 1997  HTN  HLD  [amlodipine 5 mg daily, aspirin 81 mg daily, furosemide 20 mg daily, metoprolol XL 50 mg day, rosuvastatin 10 mg daily]  Plan:  - Continue PTA meds    Likely diuretics induced hypokalemia  -potassium 2.9; will replace per protocol; check magnesium     Metastatic papillary thyroid cancer  Hypothyroidism  S/p total thyroidectomy 8/2021 with 4/4 LN positive. Treated with radioactive iodine ablation 9/2021.   -  continue  levothyroxine 137 mcg daily        BRENNEN (obstructive sleep apnea)  -does not use CPAP; continue to follow with pulmonology       Rheumatoid arthritis (H)   Follows with rheumatology. Not currently on medications for this    Diet: Regular Diet Adult      DVT Prophylaxis: Enoxaparin (Lovenox) SQ    Code Status: No CPR- Do NOT Intubate      Clinically Significant Risk Factors Present on Admission        # Hypokalemia: Lowest K = 2.9 mmol/L in last 2 days, will replace as needed         # Thrombocytopenia: Lowest platelets = 105 in last 2 days, will monitor for bleeding        # Overweight: Estimated body mass index is 27.88 kg/m  as calculated from the following:    Height as of this encounter: 1.626 m (5' 4\").    Weight as of this encounter: 73.7 kg (162 lb 6.4 oz).            Disposition: likely 1-2 days pending clinical stability; might need resumption of home oxygen  -PT recommended home with assist     Page Me (7 am to 6 pm)              Physical Exam:      Blood pressure 132/64, pulse 92, temperature 98.1  F (36.7  C), temperature source Oral, resp. rate 20, height 1.626 m (5' 4\"), weight 73.7 kg (162 lb 6.4 oz), SpO2 93 %.  Vitals:    01/09/23 1936 01/11/23 0608 01/12/23 0700   Weight: 74.4 kg (164 " lb) 73.1 kg (161 lb 3.2 oz) 73.7 kg (162 lb 6.4 oz)     Vital Signs with Ranges  Temp:  [97  F (36.1  C)-98.2  F (36.8  C)] 98.1  F (36.7  C)  Pulse:  [75-99] 92  Resp:  [18-24] 20  BP: (114-133)/(61-76) 132/64  SpO2:  [85 %-96 %] 93 %  I/O's Last 24 hours  I/O last 3 completed shifts:  In: 240 [P.O.:240]  Out: -     Constitutional: Alert, awake and oriented X 3; resting comfortably in no apparent distress   HEENT: Pupils equal and reactive to light and accomodation, neck supple    Oral cavity: Moist mucosa   Cardiovascular: Normal s1 s2, regular rate and rhythm, no murmur   Lungs: B/l clear to auscultation, mild RUL crackles +, no wheezes; has left lower chest tenderness   Abdomen: Soft, nt, nd, no guarding, rigidity or rebound; BS +   LE : No edema   Musculoskeletal: Power 5/5 in all extremities   Neuro: No focal neurological deficits noted   Psychiatry: normal mood and affect                Medications:          acetaminophen  975 mg Oral TID     albuterol  1.25 mg Nebulization 4x Daily     amLODIPine  5 mg Oral Daily     aspirin  81 mg Oral BID     azithromycin  250 mg Oral Daily     cefTRIAXone  2 g Intravenous Q24H     DULoxetine  20 mg Oral BID     enoxaparin ANTICOAGULANT  40 mg Subcutaneous Q24H     Fluticasone-Umeclidin-Vilant  1 puff Inhalation Daily     furosemide  20 mg Oral BID     levothyroxine  137 mcg Oral Daily     lidocaine  1 patch Transdermal Q24H     lidocaine   Transdermal Q8H JULIETA     metoprolol succinate ER  50 mg Oral Daily     rosuvastatin  10 mg Oral Daily     sodium chloride (PF)  3 mL Intracatheter Q8H     PRN Meds: hydrOXYzine **OR** hydrOXYzine, lidocaine 4%, lidocaine (buffered or not buffered), melatonin, naloxone **OR** naloxone **OR** naloxone **OR** naloxone, ondansetron **OR** ondansetron, oxyCODONE, sodium chloride (PF)         Data:      All new lab and imaging data was reviewed.   Recent Labs   Lab Test 01/12/23  0639 01/11/23  0659 01/10/23  0709 03/16/22  1016 02/11/22  0749  01/21/22  0629 09/01/21  0953 07/02/21  0712   WBC 6.2 7.5 7.7   < >  --  10.2   < > 6.3   HGB 12.4* 12.3* 12.0*   < >  --  11.8*   < > 13.2*   MCV 89 88 88   < >  --  92   < > 91   * 108* 117*   < > 191 227   < > 179   INR  --   --   --   --  1.03 1.18*  --  0.99    < > = values in this interval not displayed.      Recent Labs   Lab Test 01/12/23  0639 01/11/23  0659 01/10/23  0709 01/09/23  1235    139 137 136   POTASSIUM 2.9* 3.4 3.3* 3.4   CHLORIDE 102 104 101 99   CO2  --  26 28 25   BUN  --  13 13 15   CR  --  0.73 0.90 0.80   ANIONGAP  --  9 8 12   KARLIE  --  8.7 8.8 9.2   GLC  --  80 77 110*     Recent Labs   Lab Test 02/24/19  2119   TROPI <0.015

## 2023-01-13 ENCOUNTER — APPOINTMENT (OUTPATIENT)
Dept: PHYSICAL THERAPY | Facility: CLINIC | Age: 78
DRG: 205 | End: 2023-01-13
Payer: MEDICARE

## 2023-01-13 LAB
ANION GAP SERPL CALCULATED.3IONS-SCNC: 11 MMOL/L (ref 3–14)
BASOPHILS # BLD MANUAL: 0.2 10E3/UL (ref 0–0.2)
BASOPHILS NFR BLD MANUAL: 2 %
BUN SERPL-MCNC: 10 MG/DL (ref 7–30)
CALCIUM SERPL-MCNC: 9 MG/DL (ref 8.5–10.1)
CHLORIDE BLD-SCNC: 104 MMOL/L (ref 94–109)
CO2 SERPL-SCNC: 27 MMOL/L (ref 20–32)
CREAT SERPL-MCNC: 0.73 MG/DL (ref 0.66–1.25)
EOSINOPHIL # BLD MANUAL: 2.9 10E3/UL (ref 0–0.7)
EOSINOPHIL NFR BLD MANUAL: 34 %
ERYTHROCYTE [DISTWIDTH] IN BLOOD BY AUTOMATED COUNT: 16.8 % (ref 10–15)
GFR SERPL CREATININE-BSD FRML MDRD: >90 ML/MIN/1.73M2
GLUCOSE BLD-MCNC: 75 MG/DL (ref 70–99)
HCT VFR BLD AUTO: 36.1 % (ref 40–53)
HGB BLD-MCNC: 11.8 G/DL (ref 13.3–17.7)
LYMPHOCYTES # BLD MANUAL: 0.5 10E3/UL (ref 0.8–5.3)
LYMPHOCYTES NFR BLD MANUAL: 6 %
MCH RBC QN AUTO: 28.6 PG (ref 26.5–33)
MCHC RBC AUTO-ENTMCNC: 32.7 G/DL (ref 31.5–36.5)
MCV RBC AUTO: 88 FL (ref 78–100)
MONOCYTES # BLD MANUAL: 0.6 10E3/UL (ref 0–1.3)
MONOCYTES NFR BLD MANUAL: 7 %
NEUTROPHILS # BLD MANUAL: 4.3 10E3/UL (ref 1.6–8.3)
NEUTROPHILS NFR BLD MANUAL: 51 %
PATH REV: ABNORMAL
PLAT MORPH BLD: ABNORMAL
PLATELET # BLD AUTO: 118 10E3/UL (ref 150–450)
POTASSIUM BLD-SCNC: 3.6 MMOL/L (ref 3.4–5.3)
RBC # BLD AUTO: 4.12 10E6/UL (ref 4.4–5.9)
RBC MORPH BLD: ABNORMAL
SODIUM SERPL-SCNC: 142 MMOL/L (ref 133–144)
WBC # BLD AUTO: 8.4 10E3/UL (ref 4–11)

## 2023-01-13 PROCEDURE — 250N000013 HC RX MED GY IP 250 OP 250 PS 637: Performed by: STUDENT IN AN ORGANIZED HEALTH CARE EDUCATION/TRAINING PROGRAM

## 2023-01-13 PROCEDURE — 36415 COLL VENOUS BLD VENIPUNCTURE: CPT | Performed by: HOSPITALIST

## 2023-01-13 PROCEDURE — 80048 BASIC METABOLIC PNL TOTAL CA: CPT | Performed by: HOSPITALIST

## 2023-01-13 PROCEDURE — 94640 AIRWAY INHALATION TREATMENT: CPT | Mod: 76

## 2023-01-13 PROCEDURE — 99232 SBSQ HOSP IP/OBS MODERATE 35: CPT | Performed by: HOSPITALIST

## 2023-01-13 PROCEDURE — 85027 COMPLETE CBC AUTOMATED: CPT | Performed by: HOSPITALIST

## 2023-01-13 PROCEDURE — 250N000013 HC RX MED GY IP 250 OP 250 PS 637: Performed by: INTERNAL MEDICINE

## 2023-01-13 PROCEDURE — 97116 GAIT TRAINING THERAPY: CPT | Mod: GP | Performed by: PHYSICAL THERAPIST

## 2023-01-13 PROCEDURE — 250N000011 HC RX IP 250 OP 636: Performed by: INTERNAL MEDICINE

## 2023-01-13 PROCEDURE — 120N000001 HC R&B MED SURG/OB

## 2023-01-13 PROCEDURE — 250N000013 HC RX MED GY IP 250 OP 250 PS 637: Performed by: PHYSICIAN ASSISTANT

## 2023-01-13 PROCEDURE — 250N000009 HC RX 250: Performed by: PHYSICIAN ASSISTANT

## 2023-01-13 PROCEDURE — 999N000157 HC STATISTIC RCP TIME EA 10 MIN

## 2023-01-13 PROCEDURE — 85007 BL SMEAR W/DIFF WBC COUNT: CPT | Performed by: HOSPITALIST

## 2023-01-13 PROCEDURE — 250N000013 HC RX MED GY IP 250 OP 250 PS 637: Performed by: HOSPITALIST

## 2023-01-13 PROCEDURE — 94640 AIRWAY INHALATION TREATMENT: CPT

## 2023-01-13 RX ADMIN — ACETAMINOPHEN 975 MG: 325 TABLET, FILM COATED ORAL at 08:40

## 2023-01-13 RX ADMIN — LEVOTHYROXINE SODIUM 137 MCG: 137 TABLET ORAL at 08:39

## 2023-01-13 RX ADMIN — ALBUTEROL SULFATE 1.25 MG: 2.5 SOLUTION RESPIRATORY (INHALATION) at 06:55

## 2023-01-13 RX ADMIN — OXYCODONE HYDROCHLORIDE 5 MG: 5 TABLET ORAL at 22:48

## 2023-01-13 RX ADMIN — ENOXAPARIN SODIUM 40 MG: 40 INJECTION SUBCUTANEOUS at 12:29

## 2023-01-13 RX ADMIN — DULOXETINE HYDROCHLORIDE 20 MG: 20 CAPSULE, DELAYED RELEASE ORAL at 08:39

## 2023-01-13 RX ADMIN — OXYCODONE HYDROCHLORIDE 5 MG: 5 TABLET ORAL at 04:16

## 2023-01-13 RX ADMIN — FUROSEMIDE 20 MG: 20 TABLET ORAL at 08:39

## 2023-01-13 RX ADMIN — AMLODIPINE BESYLATE 5 MG: 5 TABLET ORAL at 08:39

## 2023-01-13 RX ADMIN — CYCLOBENZAPRINE 5 MG: 5 TABLET, FILM COATED ORAL at 22:48

## 2023-01-13 RX ADMIN — AZITHROMYCIN MONOHYDRATE 250 MG: 250 TABLET ORAL at 08:39

## 2023-01-13 RX ADMIN — CYCLOBENZAPRINE 5 MG: 5 TABLET, FILM COATED ORAL at 09:40

## 2023-01-13 RX ADMIN — ALBUTEROL SULFATE 1.25 MG: 2.5 SOLUTION RESPIRATORY (INHALATION) at 10:32

## 2023-01-13 RX ADMIN — OXYCODONE HYDROCHLORIDE 5 MG: 5 TABLET ORAL at 08:46

## 2023-01-13 RX ADMIN — LIDOCAINE 1 PATCH: 560 PATCH PERCUTANEOUS; TOPICAL; TRANSDERMAL at 08:40

## 2023-01-13 RX ADMIN — DULOXETINE HYDROCHLORIDE 20 MG: 20 CAPSULE, DELAYED RELEASE ORAL at 20:11

## 2023-01-13 RX ADMIN — ALBUTEROL SULFATE 1.25 MG: 2.5 SOLUTION RESPIRATORY (INHALATION) at 20:17

## 2023-01-13 RX ADMIN — ACETAMINOPHEN 975 MG: 325 TABLET, FILM COATED ORAL at 13:05

## 2023-01-13 RX ADMIN — CEFTRIAXONE SODIUM 2 G: 2 INJECTION, POWDER, FOR SOLUTION INTRAMUSCULAR; INTRAVENOUS at 12:28

## 2023-01-13 RX ADMIN — METOPROLOL SUCCINATE 50 MG: 50 TABLET, EXTENDED RELEASE ORAL at 08:40

## 2023-01-13 RX ADMIN — OXYCODONE HYDROCHLORIDE 5 MG: 5 TABLET ORAL at 17:43

## 2023-01-13 RX ADMIN — ACETAMINOPHEN 975 MG: 325 TABLET, FILM COATED ORAL at 20:11

## 2023-01-13 RX ADMIN — ASPIRIN 81 MG CHEWABLE TABLET 81 MG: 81 TABLET CHEWABLE at 20:11

## 2023-01-13 RX ADMIN — ASPIRIN 81 MG CHEWABLE TABLET 81 MG: 81 TABLET CHEWABLE at 08:39

## 2023-01-13 RX ADMIN — ROSUVASTATIN CALCIUM 10 MG: 10 TABLET, FILM COATED ORAL at 20:11

## 2023-01-13 RX ADMIN — FUROSEMIDE 20 MG: 20 TABLET ORAL at 12:29

## 2023-01-13 ASSESSMENT — ACTIVITIES OF DAILY LIVING (ADL)
ADLS_ACUITY_SCORE: 26

## 2023-01-13 NOTE — PROGRESS NOTES
[facility]  Hospitalist Progress Note        Josiah Rodriguez MD   01/13/2023        Interval History:        - Oxygen weaned down to 1 to 2 L  - K 2.9--> 3.6; magnesium normal at 1.8  - left rib pain reasonably well controlled with PRN pain meds and muscle relaxants         Assessment and Plan:        Harrison Thomas is a 77 year old male admitted on 1/9/2023. He presents with mechanical fall, Shortness of Breath, Chest Pain, and Cough, noted with left 8th rib fracture.    He has a complex past medical history including thyroid CA (s/p thyroidectomy), severe COPD, CAD, HTN, DLD, RA, BRENNEN. He follows up with Dr. Dumont and Pulmonology for probable lung cancer with possible radiation pneumonitis. He has been noted with RUL mass like lesion (PET positive). He underwent VATS biopsy (02/2022) by Dr Dumont but was noted to be incomplete due to a lot of scar tissue. He subsequently underwent radiation therapy for enlarging right upper lobe density.     He was hospitalized in 10/2022 following a fall, noted with pneumonia and/or radiation pneumonitis. Received a prolonged steroid taper at that time. In early December, 2022 he was treated empirically with Ceftin for 7 days (for CT showing scattered micro nodules with concern for possible infection). Several workup for Histo, Blasto, Legionella, Strep and Aspergillus were negative. Last seen in pulmonology clinic on 12/30/2022 at which time he had completed the prolonged steroid taper and had been off Oxygen since 12/18/22.     Closed fracture of L 8th rib s/p mechanical fall   Acute on chronic hypoxic respiratory failure  probable community card pneumonia  Severe COPD/ emphysema  - patient with significant pulmonary history including severe COPD, right upper lobe density/mass, h/o radiation pneumonitis and recent hospitalizations as noted (as summarized above, also discussed below)  - apparently had been off oxygen since 12/18/22  - this admission  presented with mechanical fall at home striking chest on chair,  sustaining L 8th rib fracture  - CT chest 1/9/23 noted acute, minimally displaced left eighth rib fracture (no effusion or pneumothorax); also noted significantly increased consolidation in the RUL in  area of previously described radiation fibrosis, could represent pneumonia but recurrent disease is also possible; Significant interval enlargement of mediastinal lymph nodes; Likely mild interstitial edema  -hypoxic respiratory failure likely multifactorial due to rib fracture, probable pneumonia, also consult for likely radiation pneumonitis flare    - On admission , pro calcitonin 0.39  - Oxygen weaned down to 1 to 1.5 lts; afebrile, no leukocytosis  - evaluated by pulmonology, given recent worsening in the RUL, suspect likely infectious process and rec continue antibiotics and repeat CT chest in 6 weeks  - will continue Rocephin and azithromycin  - PRN oxycodone, lidocaine patch for rib fracture; also added Flexeril prn for spasm  - encourage incentive spirometry and wean oxygen as able; aggressive pulmonary hygiene  - continue albuterol nebs 4 times daily, Trelegy Ellipta    Abnormal CT chest with RUL density/mass dating back prior to 2/2022, h/o empiric radiation therapy  obstructive sleep apnea-- not using CPAP  H/o radiation pneumonitis  - as noted above, follows at [facility] (last seen in clinic on 12/30/22) for his combination of severe COPD with emphysema and abnormal chest imaging with incomplete at attempted VATS lung biopsy and radiation for possible thyroid or lung cancer with possible radiation pneumonitis.  - unfortuntely was due to see Dr. Dumont 1/10, rescheduled for Monday  - CT chest this admission as noted above  - evaluated by pulmonology (signed off 1/12/23), suggested to continue antibiotics for likely CAP, repeat CT chest without contrast in 6  "weeks     CAD with hx Cx stenting 1997  HTN  HLD  [amlodipine 5 mg daily, aspirin 81 mg daily, furosemide 20 mg daily, metoprolol XL 50 mg day, rosuvastatin 10 mg daily]  Plan:  - Continue PTA meds    Likely diuretics induced hypokalemia  -potassium 2.9--3.6; supplement per protocol; magnesium normal at 1.8     Metastatic papillary thyroid cancer  Hypothyroidism  S/p total thyroidectomy 8/2021 with 4/4 LN positive. Treated with radioactive iodine ablation 9/2021.   -  continue  levothyroxine 137 mcg daily        BRENNEN (obstructive sleep apnea)  -does not use CPAP; continue to follow with pulmonology       Rheumatoid arthritis (H)   Follows with rheumatology. Not currently on medications for this    Diet: Regular Diet Adult      DVT Prophylaxis: Enoxaparin (Lovenox) SQ    Code Status: No CPR- Do NOT Intubate      Clinically Significant Risk Factors        # Hypokalemia: Lowest K = 2.9 mmol/L in last 2 days, will replace as needed         # Thrombocytopenia: Lowest platelets = 105 in last 2 days, will monitor for bleeding          # Overweight: Estimated body mass index is 27.88 kg/m  as calculated from the following:    Height as of this encounter: 1.626 m (5' 4\").    Weight as of this encounter: 73.7 kg (162 lb 6.4 oz)., PRESENT ON ADMISSION          Disposition: likely 1/14/23 if clinically stable; if unable to wean oxygen, might need resumption of home oxygen  - PT recommended home with assist     Page Me (7 am to 6 pm)              Physical Exam:      Blood pressure 133/71, pulse 86, temperature 97.8  F (36.6  C), temperature source Oral, resp. rate 20, height 1.626 m (5' 4\"), weight 73.7 kg (162 lb 6.4 oz), SpO2 92 %.  Vitals:    01/09/23 1936 01/11/23 0608 01/12/23 0700   Weight: 74.4 kg (164 lb) 73.1 kg (161 lb 3.2 oz) 73.7 kg (162 lb 6.4 oz)     Vital Signs with Ranges  Temp:  [97.3  F (36.3  C)-98.6  F (37  C)] 97.8  F (36.6  C)  Pulse:  [] 86  Resp:  [18-24] 20  BP: (106-149)/(60-73) 133/71  SpO2:  [86 " %-94 %] 92 %  I/O's Last 24 hours  No intake/output data recorded.    Constitutional: Alert, awake and oriented X 3; resting comfortably in no apparent distress   HEENT: Pupils equal and reactive to light and accomodation, neck supple    Oral cavity: Moist mucosa   Cardiovascular: Normal s1 s2, regular rate and rhythm, no murmur   Lungs: B/l clear to auscultation, no wheezes; has left lower chest tenderness   Abdomen: Soft, nt, nd, no guarding, rigidity or rebound; BS +   LE : No edema   Musculoskeletal: Power 5/5 in all extremities   Neuro: No focal neurological deficits noted   Psychiatry: normal mood and affect                Medications:          acetaminophen  975 mg Oral TID     albuterol  1.25 mg Nebulization 4x Daily     amLODIPine  5 mg Oral Daily     aspirin  81 mg Oral BID     azithromycin  250 mg Oral Daily     cefTRIAXone  2 g Intravenous Q24H     DULoxetine  20 mg Oral BID     enoxaparin ANTICOAGULANT  40 mg Subcutaneous Q24H     Fluticasone-Umeclidin-Vilant  1 puff Inhalation Daily     furosemide  20 mg Oral BID     levothyroxine  137 mcg Oral Daily     lidocaine  1 patch Transdermal Q24H     lidocaine   Transdermal Q8H JULIETA     metoprolol succinate ER  50 mg Oral Daily     rosuvastatin  10 mg Oral Daily     sodium chloride (PF)  3 mL Intracatheter Q8H     PRN Meds: cyclobenzaprine, hydrOXYzine **OR** hydrOXYzine, lidocaine 4%, lidocaine (buffered or not buffered), melatonin, naloxone **OR** naloxone **OR** naloxone **OR** naloxone, ondansetron **OR** ondansetron, oxyCODONE, sodium chloride (PF)         Data:      All new lab and imaging data was reviewed.   Recent Labs   Lab Test 01/13/23  0627 01/12/23  0639 01/11/23  0659 03/16/22  1016 02/11/22  0749 01/21/22  0629 09/01/21  0953 07/02/21  0712   WBC 8.4 6.2 7.5   < >  --  10.2   < > 6.3   HGB 11.8* 12.4* 12.3*   < >  --  11.8*   < > 13.2*   MCV 88 89 88   < >  --  92   < > 91   * 105* 108*   < > 191 227   < > 179   INR  --   --   --   --   1.03 1.18*  --  0.99    < > = values in this interval not displayed.      Recent Labs   Lab Test 01/13/23  0627 01/12/23  2215 01/12/23  1408 01/12/23  0639 01/11/23  0659     --   --  139 139   POTASSIUM 3.6 3.7 3.1* 2.9* 3.4   CHLORIDE 104  --   --  102 104   CO2 27  --   --  25 26   BUN 10  --   --  12 13   CR 0.73  --   --  0.76 0.73   ANIONGAP 11  --   --  12 9   KARLIE 9.0  --   --  8.6 8.7   GLC 75  --   --  81 80     Recent Labs   Lab Test 02/24/19  2119   TROPI <0.015

## 2023-01-13 NOTE — PROGRESS NOTES
Shift: [id number]  Orientation/Cognitive: AOx4  Observation Goals (Met/ Not Met): inpatient  Mobility Level/Assist Equipment: SBA walker  Fall Risk (Y/N): yes  Behavior Concerns: calm and cooperative  Pain Management: patient on left lateral chest of between 6-7/10 - has Tylenol 975mg TID; gave a total of Oxycodone 10mg, Flexeril 5mg - pain has been 6-7/10, per patient.  Tele/VS/O2: vitally stable and on 1.5LPM of O2 via NC with O2 sat around 91%  ABNL Lab/BG: BMP and CBC c PC - pending collection  Diet: regular  Bowel/Bladder: continent bowel and bladder  Skin Concerns: scattered bruises on bilateral arm  Drains/Devices: PIV on right arm  Tests/Procedures for next shift: none  Anticipated DC date & active delays: TBD  Patient Stated Goal for Today: pain control

## 2023-01-14 VITALS
TEMPERATURE: 97.7 F | WEIGHT: 162.4 LBS | HEART RATE: 79 BPM | DIASTOLIC BLOOD PRESSURE: 73 MMHG | SYSTOLIC BLOOD PRESSURE: 141 MMHG | HEIGHT: 64 IN | RESPIRATION RATE: 20 BRPM | OXYGEN SATURATION: 88 % | BODY MASS INDEX: 27.72 KG/M2

## 2023-01-14 LAB
BACTERIA SPT CULT: NORMAL
CREAT SERPL-MCNC: 0.73 MG/DL (ref 0.66–1.25)
GFR SERPL CREATININE-BSD FRML MDRD: >90 ML/MIN/1.73M2
GRAM STAIN RESULT: NORMAL
PLATELET # BLD AUTO: 139 10E3/UL (ref 150–450)
POTASSIUM BLD-SCNC: 3.7 MMOL/L (ref 3.4–5.3)

## 2023-01-14 PROCEDURE — 85049 AUTOMATED PLATELET COUNT: CPT | Performed by: INTERNAL MEDICINE

## 2023-01-14 PROCEDURE — 82565 ASSAY OF CREATININE: CPT | Performed by: INTERNAL MEDICINE

## 2023-01-14 PROCEDURE — 250N000013 HC RX MED GY IP 250 OP 250 PS 637: Performed by: INTERNAL MEDICINE

## 2023-01-14 PROCEDURE — 99239 HOSP IP/OBS DSCHRG MGMT >30: CPT | Performed by: HOSPITALIST

## 2023-01-14 PROCEDURE — 250N000013 HC RX MED GY IP 250 OP 250 PS 637: Performed by: HOSPITALIST

## 2023-01-14 PROCEDURE — 250N000013 HC RX MED GY IP 250 OP 250 PS 637: Performed by: PHYSICIAN ASSISTANT

## 2023-01-14 PROCEDURE — 84132 ASSAY OF SERUM POTASSIUM: CPT | Performed by: HOSPITALIST

## 2023-01-14 PROCEDURE — 250N000013 HC RX MED GY IP 250 OP 250 PS 637: Performed by: STUDENT IN AN ORGANIZED HEALTH CARE EDUCATION/TRAINING PROGRAM

## 2023-01-14 PROCEDURE — 36415 COLL VENOUS BLD VENIPUNCTURE: CPT | Performed by: INTERNAL MEDICINE

## 2023-01-14 RX ORDER — CEFUROXIME AXETIL 500 MG/1
500 TABLET ORAL 2 TIMES DAILY
Qty: 10 TABLET | Refills: 0 | Status: ON HOLD | OUTPATIENT
Start: 2023-01-14 | End: 2023-01-22

## 2023-01-14 RX ORDER — CYCLOBENZAPRINE HCL 5 MG
5 TABLET ORAL EVERY 8 HOURS PRN
Qty: 15 TABLET | Refills: 0 | Status: SHIPPED | OUTPATIENT
Start: 2023-01-14 | End: 2023-01-14

## 2023-01-14 RX ORDER — LIDOCAINE 4 G/G
1 PATCH TOPICAL EVERY 24 HOURS
Qty: 10 PATCH | Refills: 0 | Status: SHIPPED | OUTPATIENT
Start: 2023-01-14 | End: 2023-01-24

## 2023-01-14 RX ORDER — CYCLOBENZAPRINE HCL 5 MG
5 TABLET ORAL EVERY 8 HOURS PRN
Qty: 15 TABLET | Refills: 0 | Status: ON HOLD | OUTPATIENT
Start: 2023-01-14 | End: 2023-01-22

## 2023-01-14 RX ADMIN — LIDOCAINE 1 PATCH: 560 PATCH PERCUTANEOUS; TOPICAL; TRANSDERMAL at 08:11

## 2023-01-14 RX ADMIN — AZITHROMYCIN MONOHYDRATE 250 MG: 250 TABLET ORAL at 08:08

## 2023-01-14 RX ADMIN — LEVOTHYROXINE SODIUM 137 MCG: 137 TABLET ORAL at 08:06

## 2023-01-14 RX ADMIN — ACETAMINOPHEN 975 MG: 325 TABLET, FILM COATED ORAL at 08:06

## 2023-01-14 RX ADMIN — FUROSEMIDE 20 MG: 20 TABLET ORAL at 08:08

## 2023-01-14 RX ADMIN — DULOXETINE HYDROCHLORIDE 20 MG: 20 CAPSULE, DELAYED RELEASE ORAL at 08:06

## 2023-01-14 RX ADMIN — ASPIRIN 81 MG CHEWABLE TABLET 81 MG: 81 TABLET CHEWABLE at 08:08

## 2023-01-14 RX ADMIN — CYCLOBENZAPRINE 5 MG: 5 TABLET, FILM COATED ORAL at 08:06

## 2023-01-14 RX ADMIN — METOPROLOL SUCCINATE 50 MG: 50 TABLET, EXTENDED RELEASE ORAL at 08:08

## 2023-01-14 RX ADMIN — OXYCODONE HYDROCHLORIDE 5 MG: 5 TABLET ORAL at 08:09

## 2023-01-14 RX ADMIN — AMLODIPINE BESYLATE 5 MG: 5 TABLET ORAL at 08:08

## 2023-01-14 ASSESSMENT — ACTIVITIES OF DAILY LIVING (ADL)
TRANSFERRING: 0-->ASSISTANCE NEEDED (DEVELOPMENTALLY APPROPRIATE)
CHANGE_IN_FUNCTIONAL_STATUS_SINCE_ONSET_OF_CURRENT_ILLNESS/INJURY: NO
DRESSING/BATHING_DIFFICULTY: NO
DOING_ERRANDS_INDEPENDENTLY_DIFFICULTY: NO
CONCENTRATING,_REMEMBERING_OR_MAKING_DECISIONS_DIFFICULTY: NO
ADLS_ACUITY_SCORE: 26
TOILETING_ISSUES: NO
WEAR_GLASSES_OR_BLIND: YES
WALKING_OR_CLIMBING_STAIRS_DIFFICULTY: YES
NUMBER_OF_TIMES_PATIENT_HAS_FALLEN_WITHIN_LAST_SIX_MONTHS: 1
DIFFICULTY_EATING/SWALLOWING: NO
FALL_HISTORY_WITHIN_LAST_SIX_MONTHS: YES
ADLS_ACUITY_SCORE: 26
WALKING_OR_CLIMBING_STAIRS: STAIR CLIMBING DIFFICULTY, REQUIRES EQUIPMENT;AMBULATION DIFFICULTY, REQUIRES EQUIPMENT
ADLS_ACUITY_SCORE: 26
VISION_MANAGEMENT: GLASSES
ADLS_ACUITY_SCORE: 26
DIFFICULTY_COMMUNICATING: NO
EQUIPMENT_CURRENTLY_USED_AT_HOME: WALKER, ROLLING
TRANSFERRING: 1-->ASSISTANCE (EQUIPMENT/PERSON) NEEDED

## 2023-01-14 NOTE — PLAN OF CARE
"Orientation/Cognitive: A&Ox4  Observation Goals (Met/ Not Met): inpatient   Mobility Level/Assist Equipment: SBA-refuses bed alarm, educated on importance of having this safety feature and patient refused  Fall Risk (Y/N): yes  Behavior Concerns: none  Pain Management: PRN Oxycodone, Flexeril, and scheduled Tylenol   Tele/VS/O2: VSS on 2L NC  ABNL Lab/BG: n/a  Diet: regular diet   Bowel/Bladder: continent  Skin Concerns: scattered bruising   Drains/Devices: IV-SL  Tests/Procedures for next shift: none  Anticipated DC date & active delays: today  Patient Stated Goal for Today: \"go home\"  AVS reviewed with patient. All questions answered. All belongings returned to patient. IV removed. Sent home with prescribed medications. Discharging to home with spouse.     "

## 2023-01-14 NOTE — DISCHARGE SUMMARY
[facility]  Discharge Summary        Harrison Thomas MRN# [id number]   YOB: 1945 Age: 77 year old     Date of Admission:  1/9/2023  Date of Discharge:  1/14/2023  Admitting Physician:  Abel Ugarte MD  Discharge Physician: Josiah Rodriguez MD  Discharging Service: Hospitalist     Primary Provider: Yaneli Dozier  Primary Care Physician Phone Number: [phone]         Discharge Diagnoses/Problem Oriented Hospital Course (Providers):    Harrison Thomas was admitted on 1/9/2023 by Abel Ugarte MD and I would refer you to their history and physical.  The following problems were addressed during his hospitalization:    Harrison Thomas is a 77 year old male admitted on 1/9/2023. He presents with mechanical fall, Shortness of Breath, Chest Pain, and Cough, noted with left 8th rib fracture.     He has a complex past medical history including thyroid CA (s/p thyroidectomy), severe COPD, CAD, HTN, DLD, RA, BRENNEN. He follows up with Dr. Dumont and Pulmonology for probable lung cancer with possible radiation pneumonitis. He has been noted with RUL mass like lesion (PET positive). He underwent VATS biopsy (02/2022) by Dr Dumont but was noted to be incomplete due to a lot of scar tissue. He subsequently underwent radiation therapy for enlarging right upper lobe density.      He was hospitalized in 10/2022 following a fall, noted with pneumonia and/or radiation pneumonitis. Received a prolonged steroid taper at that time. In early December, 2022 he was treated empirically with Ceftin for 7 days (for CT showing scattered micro nodules with concern for possible infection). Several workup for Histo, Blasto, Legionella, Strep and Aspergillus were negative. Last seen in pulmonology clinic on 12/30/2022 at which time he had completed the prolonged steroid taper and had been off Oxygen since 12/18/22.     Closed fracture of L 8th rib s/p mechanical fall   Acute on chronic hypoxic  respiratory failure  probable community card pneumonia  Severe COPD/ emphysema  - patient with significant pulmonary history including severe COPD, right upper lobe density/mass, h/o radiation pneumonitis and recent hospitalizations as noted (as summarized above, also discussed below)  - apparently had been off oxygen since 12/18/22 prior to admission  - this admission presented with mechanical fall at home striking chest on chair,  sustaining L 8th rib fracture  - CT chest 1/9/23 noted acute, minimally displaced left eighth rib fracture (no effusion or pneumothorax); also noted significantly increased consolidation in the RUL in  area of previously described radiation fibrosis, could represent pneumonia but recurrent disease is also possible; Significant interval enlargement of mediastinal lymph nodes; Likely mild interstitial edema  -hypoxic respiratory failure likely multifactorial due to rib fracture, probable pneumonia, also consult for likely radiation pneumonitis flare     - On admission , pro calcitonin 0.39  - Oxygen weaned down to 1 to 2 lts; afebrile, no leukocytosis  - evaluated by pulmonology, given recent worsening in the RUL, suspect likely infectious process and rec continue antibiotics and repeat CT chest in 6 weeks  -was started on Rocephin and azithromycin-- switched to PO Ceftin on discharge for 5 more days  - PRN oxycodone, lidocaine patch for rib fracture; also added Flexeril prn for spasm  - suggested to continue home oxygen on discharge; encourage incentive spirometry and wean oxygen as able  - continue PTA Trelegy Ellipta      Abnormal CT chest with RUL density/mass dating back prior to 2/2022, h/o empiric radiation therapy  obstructive sleep apnea-- not using CPAP  H/o radiation pneumonitis  - as noted above, follows at [facility] (last seen in clinic on 12/30/22) for his combination of severe COPD with emphysema and  "abnormal chest imaging with incomplete at attempted VATS lung biopsy and radiation for possible thyroid or lung cancer with possible radiation pneumonitis.  - CT chest this admission as noted above  - evaluated by pulmonology (signed off 1/12/23), suggested to continue antibiotics for likely CAP, repeat CT chest without contrast in 6 weeks  -he will follow-up with pulmonology     CAD with hx Cx stenting 1997  HTN  HLD  [amlodipine 5 mg daily, aspirin 81 mg daily, furosemide 20 mg BID, metoprolol XL 50 mg day, rosuvastatin 10 mg daily]  Plan:  - Continue PTA meds     Likely diuretics induced hypokalemia  -potassium 2.9--3.6; supplemented per protocol; magnesium normal at 1.8  -started PO potassium upon discharge; to continue while on scheduled Lasix     Metastatic papillary thyroid cancer  Hypothyroidism  S/p total thyroidectomy 8/2021 with 4/4 LN positive. Treated with radioactive iodine ablation 9/2021.   -  continue  levothyroxine 137 mcg daily        BRENNEN (obstructive sleep apnea)  -does not use CPAP; continue to follow with pulmonology       Rheumatoid arthritis (H)   Follows with rheumatology. Not currently on medications for this      Code Status: No CPR- Do NOT Intubate       Clinically Significant Risk Factors          # Hypokalemia: Lowest K = 3.1 mmol/L in last 2 days, will replace as needed         # Thrombocytopenia: Lowest platelets = 118 in last 2 days, will monitor for bleeding          # Overweight: Estimated body mass index is 27.88 kg/m  as calculated from the following:    Height as of this encounter: 1.626 m (5' 4\").    Weight as of this encounter: 73.7 kg (162 lb 6.4 oz)., PRESENT ON ADMISSION       Disposition:   - home with resumption of home oxygen  - PT recommended home with assist   -patient already has home cares; will resume home care PT/RN              Brief Hospital Stay Summary Sent Home With Patient in AVS:        Reason for your hospital stay      You were admitted for evaluation of " shortness of breath and left rib   fracture. You have been started on antibiotics for pneumonia.                 Pending Results:        Unresulted Labs Ordered in the Past 30 Days of this Admission     Date and Time Order Name Status Description    1/10/2023 11:49 AM Respiratory Aerobic Bacterial Culture with Gram Stain Preliminary             Discharge Instructions and Follow-Up:      Follow-up Appointments     Follow-up and recommended labs and tests       Follow up with primary care provider, Yaneli Dozier, within 7 days for   hospital follow- up.  The following labs/tests are recommended: repeat   BMP.      Follow-up with pulmonology as suggested.                 Discharge Disposition:      Discharged to home        Discharge Medications:        Current Discharge Medication List      START taking these medications    Details   cefuroxime (CEFTIN) 500 MG tablet Take 1 tablet (500 mg) by mouth 2 times daily for 5 days  Qty: 10 tablet, Refills: 0    Associated Diagnoses: Pneumonia of right upper lobe due to infectious organism      cyclobenzaprine (FLEXERIL) 5 MG tablet Take 1 tablet (5 mg) by mouth every 8 hours as needed for muscle spasms  Qty: 15 tablet, Refills: 0    Associated Diagnoses: Closed fracture of one rib of left side, initial encounter         CONTINUE these medications which have CHANGED    Details   Lidocaine (LIDOCARE) 4 % Patch Place 1 patch onto the skin every 24 hours for 10 days To prevent lidocaine toxicity, patient should be patch free for 12 hrs daily.  Qty: 10 patch, Refills: 0    Associated Diagnoses: Closed fracture of one rib of left side, initial encounter         CONTINUE these medications which have NOT CHANGED    Details   albuterol (ACCUNEB) 1.25 MG/3ML neb solution Take 1 vial (1.25 mg) by nebulization every 4 hours as needed for shortness of breath or wheezing  Qty: 90 mL, Refills: 4    Associated Diagnoses: COPD exacerbation (H)      albuterol (PROAIR HFA/PROVENTIL HFA/VENTOLIN  HFA) 108 (90 Base) MCG/ACT inhaler INHALE 2 PUFFS BY MOUTH EVERY 6 HOURS AS NEEDED FOR WHEEZE OR FOR SHORTNESS OF BREATH  Qty: 18 g, Refills: 3    Associated Diagnoses: Chronic obstructive pulmonary disease with (acute) exacerbation (H)      amLODIPine (NORVASC) 5 MG tablet Take 5 mg by mouth daily    Associated Diagnoses: Benign essential hypertension      ascorbic acid 1000 MG TABS tablet Take 1,000 mg by mouth daily      aspirin 81 MG tablet Take 1 tablet by mouth 2 times daily      calcium carbonate (OS-KARLIE) 1500 (600 Ca) MG tablet Take 600 mg by mouth 2 times daily (with meals)      cholecalciferol 25 MCG (1000 UT) TABS Take 1,000 Units by mouth daily       DULoxetine (CYMBALTA) 20 MG capsule Take 1 capsule (20 mg) by mouth 2 times daily  Qty: 180 capsule, Refills: 3    Associated Diagnoses: PAVAN (generalized anxiety disorder); Situational depression      Fluticasone-Umeclidin-Vilanterol (TRELEGY ELLIPTA) 100-62.5-25 MCG/INH oral inhaler Inhale 1 puff into the lungs daily  Qty: 90 each, Refills: 3    Associated Diagnoses: Obstructive chronic bronchitis with exacerbation (H)      furosemide (LASIX) 20 MG tablet Take 1 tablet (20 mg) by mouth 2 times daily In morning and at noon    Associated Diagnoses: Essential hypertension, benign      levothyroxine (SYNTHROID/LEVOTHROID) 137 MCG tablet Take 1 tablet (137 mcg) by mouth daily , starting on 9/23/21  Qty: 90 tablet, Refills: 1    Associated Diagnoses: Postsurgical hypothyroidism      metoprolol succinate ER (TOPROL XL) 50 MG 24 hr tablet Take 1 tablet (50 mg) by mouth daily Takes in AM    Associated Diagnoses: Essential (primary) hypertension      multivitamin w/minerals (THERA-VIT-M) tablet Take 1 tablet by mouth daily       rosuvastatin (CRESTOR) 10 MG tablet TAKE 1 TABLET BY MOUTH EVERY DAY  Qty: 90 tablet, Refills: 3    Comments: DX Code Needed  .  Associated Diagnoses: Hyperlipidemia, unspecified      acetaminophen (TYLENOL) 325 MG tablet Take 3 tablets (548  mg) by mouth 3 times daily  Qty: 60 tablet, Refills: 1    Associated Diagnoses: Metastasis from thyroid cancer (H)      guaiFENesin (MUCINEX) 600 MG 12 hr tablet Take 600 mg by mouth 2 times daily as needed for congestion      hydrOXYzine (ATARAX) 10 MG tablet Take 1 tablet (10 mg) by mouth 3 times daily as needed for itching  Qty: 90 tablet, Refills: 3    Associated Diagnoses: COPD exacerbation (H); PAVAN (generalized anxiety disorder)      naloxone (NARCAN) 4 MG/0.1ML nasal spray Spray 1 spray (4 mg) into one nostril alternating nostrils once as needed for opioid reversal every 2-3 minutes until assistance arrives  Qty: 0.2 mL, Refills: 3    Associated Diagnoses: Spinal stenosis of lumbar region with neurogenic claudication; Metastasis from thyroid cancer (H); Rheumatoid arthritis involving multiple sites with positive rheumatoid factor (H)      nitroGLYcerin (NITROSTAT) 0.4 MG sublingual tablet FOR CHEST PAIN PLACE 1 TAB UNDER TONGUE EVERY 5 MIN FOR 3 DOSES. IF SYMPTOMS PERSIST 5 MIN AFTER 1ST DOSE CALL 911  Qty: 25 tablet, Refills: 3    Comments: DX Code Needed  PLEASE SEND PT STATES ONES HE HAS ARE CLOSE TO .  Associated Diagnoses: Coronary artery disease involving native coronary artery of native heart without angina pectoris      oxyCODONE (ROXICODONE) 5 MG tablet Take 1 tablet (5 mg) by mouth 3 times daily as needed for moderate to severe pain  Qty: 90 tablet, Refills: 0    Associated Diagnoses: Arthralgia of left lower leg      senna-docusate (SENOKOT-S/PERICOLACE) 8.6-50 MG tablet Take 1 tablet by mouth 2 times daily as needed for constipation         STOP taking these medications       Carisoprodol 250 MG TABS Comments:   Reason for Stopping:         predniSONE (DELTASONE) 10 MG tablet Comments:   Reason for Stopping:                 Allergies:         Allergies   Allergen Reactions     Levaquin Difficulty breathing     Plavix [Clopidogrel Bisulfate] Itching     Atorvastatin Calcium Cramps     lipitor  "    Cats      Dogs      Hctz [Hydrochlorothiazide]      Rash on legs     Sulfasalazine Other (See Comments)     Stomach cramps            Consultations This Hospital Stay:      Consultation during this admission received from pulmonary medicine        Condition and Physical on Discharge:      Discharge condition: Stable   Vitals: Blood pressure (!) 141/73, pulse 79, temperature 97.7  F (36.5  C), temperature source Oral, resp. rate 20, height 1.626 m (5' 4\"), weight 73.7 kg (162 lb 6.4 oz), SpO2 (!) 88 %.     Constitutional: Alert, awake and oriented X 3; resting comfortably in no apparent distress   HEENT: Pupils equal and reactive to light and accomodation, neck supple    Oral cavity: Moist mucosa   Cardiovascular: Normal s1 s2, regular rate and rhythm, no murmur   Lungs: B/l clear to auscultation, no wheezes; has left lower chest tenderness   Abdomen: Soft, nt, nd, no guarding, rigidity or rebound; BS +   LE : No edema   Musculoskeletal: Power 5/5 in all extremities   Neuro: No focal neurological deficits noted   Psychiatry: normal mood and affect               Discharge Time:      Greater than 30 minutes.        Image Results From This Hospital Stay (For Non-EPIC Providers):        Results for orders placed or performed during the hospital encounter of 01/09/23   Chest CT w/o contrast    Narrative    CT CHEST W/O CONTRAST 1/9/2023 1:00 PM    CLINICAL HISTORY: fall, left rib pain, SOB; r/o fracture, PTX,  effusion, etc    TECHNIQUE: CT chest without IV contrast. Multiplanar reformats were  obtained. Dose reduction techniques were used.  CONTRAST: None.    COMPARISON: Chest CT 12/30/2022    FINDINGS:   LUNGS AND PLEURA: Moderate upper lobe predominant centrilobular and  paraseptal emphysema. Significantly increased masslike consolidation  in the inferior aspect of the right upper lobe, which appears somewhat  linear/bandlike on the coronal images. There is new interlobular  septal thickening throughout both lungs. " Increased nodular  consolidation in the right lower lobe, currently measuring up to 1.6  cm, previously 1.1 cm (series 4 image 163).  Scattered pulmonary  nodules are again noted with index left upper lobe nodule measuring  0.5 cm, previously 0.4 cm (series 4 image 40) and left lower lobe  nodule measuring 0.5 cm, previously 0.5 cm (series 4 image 227).    MEDIASTINUM/AXILLAE: Significant enlargement of multiple mediastinal  lymph nodes with right paratracheal node measuring 2.2 cm in short  axis, previously 1.0 cm (series 3 image 51). No thoracic aortic  aneurysm.    CORONARY ARTERY CALCIFICATION: Previous intervention (stents).    UPPER ABDOMEN: Prior partial right hemicolectomy. Cholelithiasis  without evidence of acute cholecystitis.    MUSCULOSKELETAL: New acute, minimally-displaced left lateral eighth  rib fracture (series 4 image 239). Additional healed left rib  fractures are unchanged.      Impression    IMPRESSION:   1.  Acute, minimally displaced left eighth rib fracture. No underlying  pleural effusion or pneumothorax.  2.  Significantly increased consolidation in the right upper lobe in  area of previously described radiation fibrosis, could represent  pneumonia but recurrent disease is also possible. Recommend  pulmonology referral if not previously performed.  3.  Significant interval enlargement of mediastinal lymph nodes,  likely related to #2.  4.  Likely mild interstitial edema.    FERNANDEZ ARMANDO MD         SYSTEM ID:  [id number]     *Note: Due to a large number of results and/or encounters for the requested time period, some results have not been displayed. A complete set of results can be found in Results Review.           Most Recent Lab Results In EPIC (For Non-EPIC Providers):    Most Recent 3 CBC's:  Recent Labs   Lab Test 01/14/23  0632 01/13/23  0627 01/12/23  0639 01/11/23  0659   WBC  --  8.4 6.2 7.5   HGB  --  11.8* 12.4* 12.3*   MCV  --  88 89 88   * 118* 105* 108*      Most  Recent 3 BMP's:  Recent Labs   Lab Test 01/14/23  0632 01/13/23  0627 01/12/23  2215 01/12/23  1408 01/12/23  0639 01/11/23  0659   NA  --  142  --   --  139 139   POTASSIUM 3.7 3.6 3.7   < > 2.9* 3.4   CHLORIDE  --  104  --   --  102 104   CO2  --  27  --   --  25 26   BUN  --  10  --   --  12 13   CR 0.73 0.73  --   --  0.76 0.73   ANIONGAP  --  11  --   --  12 9   KARLIE  --  9.0  --   --  8.6 8.7   GLC  --  75  --   --  81 80    < > = values in this interval not displayed.     Most Recent 3 Troponin's:  Recent Labs   Lab Test 02/24/19  2119   TROPI <0.015     Most Recent 3 INR's:  Recent Labs   Lab Test 02/11/22  0749 01/21/22  0629 07/02/21  0712   INR 1.03 1.18* 0.99     Most Recent 2 LFT's:  Recent Labs   Lab Test 11/23/22  0725 11/15/22  1223   AST 15 20   ALT 54 61*   ALKPHOS 77 101   BILITOTAL 0.8 0.8     Most Recent Cholesterol Panel:  Recent Labs   Lab Test 05/02/22  0956   CHOL 164   LDL 94   HDL 46   TRIG 121     Most Recent 6 Bacteria Isolates From Any Culture (See EPIC Reports for Culture Details):  Recent Labs   Lab Test 04/05/19  1040 04/01/19  1124 03/26/19  1145   CULT >100,000 colonies/mL  mixed urogenital ana luisa    Susceptibility testing not routinely done >100,000 colonies/mL  mixed urogenital ana luisa   >100,000 colonies/mL  urogenital ana luisa  Susceptibility testing not routinely done    10,000 to 50,000 colonies/mL  Coagulase negative Staphylococcus  *     Most Recent TSH, T4 and HgbA1c:   Recent Labs   Lab Test 12/03/22  1004 11/15/22  1223   TSH 4.30*  --    T4 2.06*  --    A1C  --  5.7*

## 2023-01-14 NOTE — PLAN OF CARE
Goal Outcome Evaluation:    Shift: 7 a to 7 p  Orientation/Cognitive: AOx4  Observation Goals (Met/ Not Met): inpatient  Mobility Level/Assist Equipment: Independent in room with walker  Fall Risk (Y/N): yes, refuses alarm  Behavior Concerns: calm and cooperative  Pain Management: Lidocaine patch in chest, Oxycodone, scheduled Tylenol  Tele/VS/O2: vitally stable and on 1LPM of O2 via NC with O2 sat around 91% to 93%  ABNL Lab/BG: Respiratory bacterial culture pending  Diet: regular  Bowel/Bladder: continent bowel and bladder  Skin Concerns: scattered bruises on bilateral arm  Drains/Devices: PIV on right arm  Tests/Procedures for next shift: none  Anticipated DC date & active delays: TBD  Patient Stated Goal for Today: pain control

## 2023-01-14 NOTE — PROGRESS NOTES
Patient seen and examined    -No acute issues overnight  -pain reasonably well controlled; does have oxycodone script from prior prescription; will order for lidocaine patch and Flexeril PRN on discharge  -still needing about 1 L oxygen; desaturate with activity; suggested to continue home oxygen  -has follow-up appointment with pulmonology    Discharge home today    Please see discharge summary for details

## 2023-01-14 NOTE — PROGRESS NOTES
Shift [id number]    Summary    Neuro: A&O x4, moves all extremities, follows commands    Card: No issues    Resp: Pt hasn't complained of shortness of breath during shift. Pt remains on 1L NC throughout shift.    GI/: Pt able to get up with the walker and walk to bathroom independently without issue.    Pain: Pt complained of 7/10 rib and back pain. Oxy given per order and flexeril. Pt sleeping on and off throughout shift.    Joyce Magallon RN

## 2023-01-14 NOTE — PROGRESS NOTES
Care Management Discharge Note    Discharge Date: 01/14/2023       Discharge Disposition: Home, Home Care    Discharge Services: None    Discharge DME: None    Discharge Transportation: family or friend will provide    Private pay costs discussed: Not applicable    PAS Confirmation Code:  (n/a)  Patient/family educated on Medicare website which has current facility and service quality ratings: yes (medicare.gov/care-compare)    Education Provided on the Discharge Plan:  Updated AVS with [facility] contact information   Persons Notified of Discharge Plans: aracelis VU requesting the Home Care orders for [facility]   Patient/Family in Agreement with the Plan: yes    Handoff Referral Completed: Yes    Additional Information:  Noted pt has discharge order in place.  Per CM consult pt is open to [facility].  Per [facility] they will need RN & PT order.  Paged MD for order.  Added [facility] contact info to AVS.  Pt has PCP followup already scheduled with Dr. Dozier, Jan 23 at 1030.  Added Clinic Care Coordination referral per protocol, readmission risk is 33%.  Gave heads up on discharge order to [facility].       Zaria Bernal RN, BSN, PHN  [facility]  Inpatient Care Management - FLOAT  Short Stay CM RN Mobile: [phone] daily 7:30-4:00

## 2023-01-15 ENCOUNTER — APPOINTMENT (OUTPATIENT)
Dept: CT IMAGING | Facility: CLINIC | Age: 78
DRG: 871 | End: 2023-01-15
Attending: EMERGENCY MEDICINE
Payer: MEDICARE

## 2023-01-15 ENCOUNTER — HOSPITAL ENCOUNTER (INPATIENT)
Facility: CLINIC | Age: 78
LOS: 7 days | Discharge: HOME | DRG: 871 | End: 2023-01-22
Attending: EMERGENCY MEDICINE | Admitting: INTERNAL MEDICINE
Payer: MEDICARE

## 2023-01-15 ENCOUNTER — APPOINTMENT (OUTPATIENT)
Dept: GENERAL RADIOLOGY | Facility: CLINIC | Age: 78
DRG: 871 | End: 2023-01-15
Attending: EMERGENCY MEDICINE
Payer: MEDICARE

## 2023-01-15 DIAGNOSIS — G89.4 CHRONIC PAIN DISORDER: Primary | ICD-10-CM

## 2023-01-15 DIAGNOSIS — R57.9 SHOCK (H): ICD-10-CM

## 2023-01-15 DIAGNOSIS — E86.0 DEHYDRATION: ICD-10-CM

## 2023-01-15 DIAGNOSIS — J96.21 ACUTE ON CHRONIC RESPIRATORY FAILURE WITH HYPOXIA (H): ICD-10-CM

## 2023-01-15 DIAGNOSIS — E87.6 HYPOKALEMIA: ICD-10-CM

## 2023-01-15 DIAGNOSIS — N17.9 AKI (ACUTE KIDNEY INJURY) (H): ICD-10-CM

## 2023-01-15 DIAGNOSIS — J44.9 COPD, VERY SEVERE (H): ICD-10-CM

## 2023-01-15 PROBLEM — N17.0 ACUTE KIDNEY FAILURE WITH TUBULAR NECROSIS (H): Status: ACTIVE | Noted: 2023-01-15

## 2023-01-15 LAB
ALBUMIN SERPL-MCNC: 2.3 G/DL (ref 3.4–5)
ALBUMIN UR-MCNC: 70 MG/DL
ALLEN'S TEST: NO
ALP SERPL-CCNC: 128 U/L (ref 40–150)
ALT SERPL W P-5'-P-CCNC: 55 U/L (ref 0–70)
ANION GAP SERPL CALCULATED.3IONS-SCNC: 19 MMOL/L (ref 3–14)
APPEARANCE UR: ABNORMAL
AST SERPL W P-5'-P-CCNC: 99 U/L (ref 0–45)
BASE EXCESS BLDA CALC-SCNC: -7.5 MMOL/L (ref -9–1.8)
BASE EXCESS BLDV CALC-SCNC: -10.1 MMOL/L (ref -7.7–1.9)
BASE EXCESS BLDV CALC-SCNC: -7 MMOL/L (ref -7.7–1.9)
BASOPHILS # BLD MANUAL: 0 10E3/UL (ref 0–0.2)
BASOPHILS NFR BLD MANUAL: 0 %
BILIRUB SERPL-MCNC: 0.4 MG/DL (ref 0.2–1.3)
BILIRUB UR QL STRIP: NEGATIVE
BUN SERPL-MCNC: 14 MG/DL (ref 7–30)
CALCIUM SERPL-MCNC: 8.9 MG/DL (ref 8.5–10.1)
CHLORIDE BLD-SCNC: 105 MMOL/L (ref 94–109)
CO2 SERPL-SCNC: 18 MMOL/L (ref 20–32)
COLOR UR AUTO: YELLOW
CREAT SERPL-MCNC: 1.22 MG/DL (ref 0.66–1.25)
EOSINOPHIL # BLD MANUAL: 0.3 10E3/UL (ref 0–0.7)
EOSINOPHIL NFR BLD MANUAL: 4 %
ERYTHROCYTE [DISTWIDTH] IN BLOOD BY AUTOMATED COUNT: 17 % (ref 10–15)
GFR SERPL CREATININE-BSD FRML MDRD: 62 ML/MIN/1.73M2
GLUCOSE BLD-MCNC: 66 MG/DL (ref 70–99)
GLUCOSE BLDC GLUCOMTR-MCNC: 123 MG/DL (ref 70–99)
GLUCOSE BLDC GLUCOMTR-MCNC: 80 MG/DL (ref 70–99)
GLUCOSE UR STRIP-MCNC: NEGATIVE MG/DL
GRANULAR CAST: 12 /LPF
HCO3 BLD-SCNC: 19 MMOL/L (ref 21–28)
HCO3 BLDV-SCNC: 19 MMOL/L (ref 21–28)
HCO3 BLDV-SCNC: 21 MMOL/L (ref 21–28)
HCO3 BLDV-SCNC: 21 MMOL/L (ref 21–28)
HCO3 BLDV-SCNC: 22 MMOL/L (ref 21–28)
HCT VFR BLD AUTO: 46.7 % (ref 40–53)
HGB BLD-MCNC: 14.3 G/DL (ref 13.3–17.7)
HGB UR QL STRIP: ABNORMAL
HOLD SPECIMEN: NORMAL
HYALINE CASTS: 1 /LPF
KETONES UR STRIP-MCNC: 40 MG/DL
LACTATE BLD-SCNC: 4.2 MMOL/L
LACTATE BLD-SCNC: 7.6 MMOL/L
LACTATE SERPL-SCNC: 1.6 MMOL/L (ref 0.7–2)
LACTATE SERPL-SCNC: 2.5 MMOL/L (ref 0.7–2)
LACTATE SERPL-SCNC: 3.7 MMOL/L (ref 0.7–2)
LEUKOCYTE ESTERASE UR QL STRIP: NEGATIVE
LYMPHOCYTES # BLD MANUAL: 0.8 10E3/UL (ref 0.8–5.3)
LYMPHOCYTES NFR BLD MANUAL: 11 %
MAGNESIUM SERPL-MCNC: 2.1 MG/DL (ref 1.6–2.3)
MCH RBC QN AUTO: 29 PG (ref 26.5–33)
MCHC RBC AUTO-ENTMCNC: 30.6 G/DL (ref 31.5–36.5)
MCV RBC AUTO: 95 FL (ref 78–100)
METAMYELOCYTES # BLD MANUAL: 0.1 10E3/UL
METAMYELOCYTES NFR BLD MANUAL: 2 %
MONOCYTES # BLD MANUAL: 0.3 10E3/UL (ref 0–1.3)
MONOCYTES NFR BLD MANUAL: 4 %
MUCOUS THREADS #/AREA URNS LPF: PRESENT /LPF
NEUTROPHILS # BLD MANUAL: 5.7 10E3/UL (ref 1.6–8.3)
NEUTROPHILS NFR BLD MANUAL: 79 %
NITRATE UR QL: NEGATIVE
NRBC # BLD AUTO: 0.4 10E3/UL
NRBC BLD MANUAL-RTO: 5 %
NT-PROBNP SERPL-MCNC: 2374 PG/ML (ref 0–900)
O2/TOTAL GAS SETTING VFR VENT: 100 %
O2/TOTAL GAS SETTING VFR VENT: 15 %
O2/TOTAL GAS SETTING VFR VENT: 40 %
OXYHGB MFR BLDV: 60 % (ref 70–75)
OXYHGB MFR BLDV: 85 % (ref 70–75)
PCO2 BLD: 42 MM HG (ref 35–45)
PCO2 BLDV: 49 MM HG (ref 40–50)
PCO2 BLDV: 51 MM HG (ref 40–50)
PCO2 BLDV: 54 MM HG (ref 40–50)
PCO2 BLDV: 67 MM HG (ref 40–50)
PH BLD: 7.27 [PH] (ref 7.35–7.45)
PH BLDV: 7.12 [PH] (ref 7.32–7.43)
PH BLDV: 7.15 [PH] (ref 7.32–7.43)
PH BLDV: 7.22 [PH] (ref 7.32–7.43)
PH BLDV: 7.23 [PH] (ref 7.32–7.43)
PH UR STRIP: 5.5 [PH] (ref 5–7)
PLAT MORPH BLD: ABNORMAL
PLATELET # BLD AUTO: 141 10E3/UL (ref 150–450)
PO2 BLD: 310 MM HG (ref 80–105)
PO2 BLDV: 30 MM HG (ref 25–47)
PO2 BLDV: 39 MM HG (ref 25–47)
PO2 BLDV: 39 MM HG (ref 25–47)
PO2 BLDV: 67 MM HG (ref 25–47)
POLYCHROMASIA BLD QL SMEAR: SLIGHT
POTASSIUM BLD-SCNC: 4.3 MMOL/L (ref 3.4–5.3)
PROCALCITONIN SERPL-MCNC: 1.43 NG/ML
PROT SERPL-MCNC: 7 G/DL (ref 6.8–8.8)
RBC # BLD AUTO: 4.93 10E6/UL (ref 4.4–5.9)
RBC MORPH BLD: ABNORMAL
RBC URINE: 14 /HPF
SAO2 % BLDV: 37 % (ref 94–100)
SAO2 % BLDV: 63 % (ref 94–100)
SODIUM SERPL-SCNC: 142 MMOL/L (ref 133–144)
SP GR UR STRIP: 1.02 (ref 1–1.03)
TROPONIN I SERPL HS-MCNC: 27 NG/L
UROBILINOGEN UR STRIP-MCNC: NORMAL MG/DL
WBC # BLD AUTO: 7.2 10E3/UL (ref 4–11)
WBC URINE: 0 /HPF

## 2023-01-15 PROCEDURE — 84484 ASSAY OF TROPONIN QUANT: CPT | Performed by: EMERGENCY MEDICINE

## 2023-01-15 PROCEDURE — 70450 CT HEAD/BRAIN W/O DYE: CPT | Mod: MG

## 2023-01-15 PROCEDURE — 94640 AIRWAY INHALATION TREATMENT: CPT | Mod: 76

## 2023-01-15 PROCEDURE — 71275 CT ANGIOGRAPHY CHEST: CPT | Mod: ME

## 2023-01-15 PROCEDURE — 71045 X-RAY EXAM CHEST 1 VIEW: CPT

## 2023-01-15 PROCEDURE — 94640 AIRWAY INHALATION TREATMENT: CPT

## 2023-01-15 PROCEDURE — 258N000003 HC RX IP 258 OP 636: Performed by: INTERNAL MEDICINE

## 2023-01-15 PROCEDURE — 200N000001 HC R&B ICU

## 2023-01-15 PROCEDURE — 80053 COMPREHEN METABOLIC PANEL: CPT | Performed by: EMERGENCY MEDICINE

## 2023-01-15 PROCEDURE — 36415 COLL VENOUS BLD VENIPUNCTURE: CPT | Performed by: HOSPITALIST

## 2023-01-15 PROCEDURE — 99291 CRITICAL CARE FIRST HOUR: CPT | Performed by: INTERNAL MEDICINE

## 2023-01-15 PROCEDURE — 94660 CPAP INITIATION&MGMT: CPT

## 2023-01-15 PROCEDURE — 258N000003 HC RX IP 258 OP 636: Performed by: EMERGENCY MEDICINE

## 2023-01-15 PROCEDURE — 83605 ASSAY OF LACTIC ACID: CPT

## 2023-01-15 PROCEDURE — 250N000009 HC RX 250

## 2023-01-15 PROCEDURE — 82803 BLOOD GASES ANY COMBINATION: CPT

## 2023-01-15 PROCEDURE — 93005 ELECTROCARDIOGRAM TRACING: CPT

## 2023-01-15 PROCEDURE — 250N000011 HC RX IP 250 OP 636: Performed by: EMERGENCY MEDICINE

## 2023-01-15 PROCEDURE — 250N000013 HC RX MED GY IP 250 OP 250 PS 637: Performed by: HOSPITALIST

## 2023-01-15 PROCEDURE — 250N000009 HC RX 250: Performed by: EMERGENCY MEDICINE

## 2023-01-15 PROCEDURE — 250N000009 HC RX 250: Performed by: INTERNAL MEDICINE

## 2023-01-15 PROCEDURE — 250N000011 HC RX IP 250 OP 636: Performed by: INTERNAL MEDICINE

## 2023-01-15 PROCEDURE — 99207 PR NO CHARGE LOS: CPT | Performed by: HOSPITALIST

## 2023-01-15 PROCEDURE — 82805 BLOOD GASES W/O2 SATURATION: CPT | Performed by: EMERGENCY MEDICINE

## 2023-01-15 PROCEDURE — 250N000011 HC RX IP 250 OP 636

## 2023-01-15 PROCEDURE — 84145 PROCALCITONIN (PCT): CPT | Performed by: EMERGENCY MEDICINE

## 2023-01-15 PROCEDURE — 5A09357 ASSISTANCE WITH RESPIRATORY VENTILATION, LESS THAN 24 CONSECUTIVE HOURS, CONTINUOUS POSITIVE AIRWAY PRESSURE: ICD-10-PCS | Performed by: INTERNAL MEDICINE

## 2023-01-15 PROCEDURE — 85014 HEMATOCRIT: CPT | Performed by: EMERGENCY MEDICINE

## 2023-01-15 PROCEDURE — 85007 BL SMEAR W/DIFF WBC COUNT: CPT | Performed by: EMERGENCY MEDICINE

## 2023-01-15 PROCEDURE — 87040 BLOOD CULTURE FOR BACTERIA: CPT | Performed by: EMERGENCY MEDICINE

## 2023-01-15 PROCEDURE — 250N000011 HC RX IP 250 OP 636: Performed by: HOSPITALIST

## 2023-01-15 PROCEDURE — 999N000157 HC STATISTIC RCP TIME EA 10 MIN

## 2023-01-15 PROCEDURE — 96365 THER/PROPH/DIAG IV INF INIT: CPT

## 2023-01-15 PROCEDURE — 71045 X-RAY EXAM CHEST 1 VIEW: CPT | Mod: 76

## 2023-01-15 PROCEDURE — 83605 ASSAY OF LACTIC ACID: CPT | Performed by: HOSPITALIST

## 2023-01-15 PROCEDURE — 81001 URINALYSIS AUTO W/SCOPE: CPT | Performed by: HOSPITALIST

## 2023-01-15 PROCEDURE — 96375 TX/PRO/DX INJ NEW DRUG ADDON: CPT

## 2023-01-15 PROCEDURE — 36415 COLL VENOUS BLD VENIPUNCTURE: CPT

## 2023-01-15 PROCEDURE — 83605 ASSAY OF LACTIC ACID: CPT | Performed by: INTERNAL MEDICINE

## 2023-01-15 PROCEDURE — 82803 BLOOD GASES ANY COMBINATION: CPT | Performed by: INTERNAL MEDICINE

## 2023-01-15 PROCEDURE — 36415 COLL VENOUS BLD VENIPUNCTURE: CPT | Performed by: EMERGENCY MEDICINE

## 2023-01-15 PROCEDURE — 99291 CRITICAL CARE FIRST HOUR: CPT | Mod: 25

## 2023-01-15 PROCEDURE — 99292 CRITICAL CARE ADDL 30 MIN: CPT

## 2023-01-15 PROCEDURE — 36415 COLL VENOUS BLD VENIPUNCTURE: CPT | Performed by: INTERNAL MEDICINE

## 2023-01-15 PROCEDURE — 83735 ASSAY OF MAGNESIUM: CPT | Performed by: INTERNAL MEDICINE

## 2023-01-15 PROCEDURE — 83880 ASSAY OF NATRIURETIC PEPTIDE: CPT | Performed by: EMERGENCY MEDICINE

## 2023-01-15 RX ORDER — LORAZEPAM 2 MG/ML
.5-1 INJECTION INTRAMUSCULAR EVERY 4 HOURS PRN
Status: DISCONTINUED | OUTPATIENT
Start: 2023-01-15 | End: 2023-01-22 | Stop reason: HOSPADM

## 2023-01-15 RX ORDER — IOPAMIDOL 755 MG/ML
63 INJECTION, SOLUTION INTRAVASCULAR ONCE
Status: COMPLETED | OUTPATIENT
Start: 2023-01-15 | End: 2023-01-15

## 2023-01-15 RX ORDER — NICOTINE POLACRILEX 4 MG
15-30 LOZENGE BUCCAL
Status: DISCONTINUED | OUTPATIENT
Start: 2023-01-15 | End: 2023-01-22 | Stop reason: HOSPADM

## 2023-01-15 RX ORDER — AMOXICILLIN 250 MG
2 CAPSULE ORAL 2 TIMES DAILY PRN
Status: DISCONTINUED | OUTPATIENT
Start: 2023-01-15 | End: 2023-01-22 | Stop reason: HOSPADM

## 2023-01-15 RX ORDER — IPRATROPIUM BROMIDE AND ALBUTEROL SULFATE 2.5; .5 MG/3ML; MG/3ML
3 SOLUTION RESPIRATORY (INHALATION)
Status: DISCONTINUED | OUTPATIENT
Start: 2023-01-15 | End: 2023-01-16

## 2023-01-15 RX ORDER — MAGNESIUM SULFATE HEPTAHYDRATE 40 MG/ML
2 INJECTION, SOLUTION INTRAVENOUS ONCE
Status: COMPLETED | OUTPATIENT
Start: 2023-01-15 | End: 2023-01-15

## 2023-01-15 RX ORDER — BISACODYL 10 MG
10 SUPPOSITORY, RECTAL RECTAL DAILY PRN
Status: DISCONTINUED | OUTPATIENT
Start: 2023-01-15 | End: 2023-01-22 | Stop reason: HOSPADM

## 2023-01-15 RX ORDER — NALOXONE HYDROCHLORIDE 0.4 MG/ML
INJECTION, SOLUTION INTRAMUSCULAR; INTRAVENOUS; SUBCUTANEOUS
Status: COMPLETED
Start: 2023-01-15 | End: 2023-01-15

## 2023-01-15 RX ORDER — DULOXETIN HYDROCHLORIDE 20 MG/1
20 CAPSULE, DELAYED RELEASE ORAL 2 TIMES DAILY
Status: DISCONTINUED | OUTPATIENT
Start: 2023-01-15 | End: 2023-01-22 | Stop reason: HOSPADM

## 2023-01-15 RX ORDER — NALOXONE HYDROCHLORIDE 0.4 MG/ML
0.1 INJECTION, SOLUTION INTRAMUSCULAR; INTRAVENOUS; SUBCUTANEOUS ONCE
Status: COMPLETED | OUTPATIENT
Start: 2023-01-15 | End: 2023-01-15

## 2023-01-15 RX ORDER — IPRATROPIUM BROMIDE AND ALBUTEROL SULFATE 2.5; .5 MG/3ML; MG/3ML
SOLUTION RESPIRATORY (INHALATION)
Status: COMPLETED
Start: 2023-01-15 | End: 2023-01-15

## 2023-01-15 RX ORDER — ONDANSETRON 4 MG/1
4 TABLET, ORALLY DISINTEGRATING ORAL EVERY 6 HOURS PRN
Status: DISCONTINUED | OUTPATIENT
Start: 2023-01-15 | End: 2023-01-22 | Stop reason: HOSPADM

## 2023-01-15 RX ORDER — NOREPINEPHRINE BITARTRATE 0.02 MG/ML
INJECTION, SOLUTION INTRAVENOUS
Status: COMPLETED
Start: 2023-01-15 | End: 2023-01-15

## 2023-01-15 RX ORDER — METHYLPREDNISOLONE SODIUM SUCCINATE 125 MG/2ML
INJECTION, POWDER, LYOPHILIZED, FOR SOLUTION INTRAMUSCULAR; INTRAVENOUS
Status: COMPLETED
Start: 2023-01-15 | End: 2023-01-15

## 2023-01-15 RX ORDER — ONDANSETRON 2 MG/ML
4 INJECTION INTRAMUSCULAR; INTRAVENOUS EVERY 6 HOURS PRN
Status: DISCONTINUED | OUTPATIENT
Start: 2023-01-15 | End: 2023-01-22 | Stop reason: HOSPADM

## 2023-01-15 RX ORDER — ALBUTEROL SULFATE 0.83 MG/ML
2.5 SOLUTION RESPIRATORY (INHALATION)
Status: DISCONTINUED | OUTPATIENT
Start: 2023-01-15 | End: 2023-01-22 | Stop reason: HOSPADM

## 2023-01-15 RX ORDER — CARBOXYMETHYLCELLULOSE SODIUM 5 MG/ML
1 SOLUTION/ DROPS OPHTHALMIC
Status: DISCONTINUED | OUTPATIENT
Start: 2023-01-15 | End: 2023-01-22 | Stop reason: HOSPADM

## 2023-01-15 RX ORDER — AMOXICILLIN 250 MG
1 CAPSULE ORAL 2 TIMES DAILY PRN
Status: DISCONTINUED | OUTPATIENT
Start: 2023-01-15 | End: 2023-01-22 | Stop reason: HOSPADM

## 2023-01-15 RX ORDER — LIDOCAINE 4 G/G
1 PATCH TOPICAL
Status: DISCONTINUED | OUTPATIENT
Start: 2023-01-15 | End: 2023-01-22 | Stop reason: HOSPADM

## 2023-01-15 RX ORDER — PROCHLORPERAZINE 25 MG
12.5 SUPPOSITORY, RECTAL RECTAL EVERY 12 HOURS PRN
Status: DISCONTINUED | OUTPATIENT
Start: 2023-01-15 | End: 2023-01-22 | Stop reason: HOSPADM

## 2023-01-15 RX ORDER — PIPERACILLIN SODIUM, TAZOBACTAM SODIUM 4; .5 G/20ML; G/20ML
4.5 INJECTION, POWDER, LYOPHILIZED, FOR SOLUTION INTRAVENOUS EVERY 6 HOURS
Status: COMPLETED | OUTPATIENT
Start: 2023-01-15 | End: 2023-01-20

## 2023-01-15 RX ORDER — ACETAMINOPHEN 325 MG/1
975 TABLET ORAL 3 TIMES DAILY
Status: DISCONTINUED | OUTPATIENT
Start: 2023-01-15 | End: 2023-01-22 | Stop reason: HOSPADM

## 2023-01-15 RX ORDER — ROSUVASTATIN CALCIUM 10 MG/1
10 TABLET, COATED ORAL DAILY
Status: DISCONTINUED | OUTPATIENT
Start: 2023-01-16 | End: 2023-01-22 | Stop reason: HOSPADM

## 2023-01-15 RX ORDER — METOPROLOL SUCCINATE 50 MG/1
50 TABLET, EXTENDED RELEASE ORAL DAILY
Status: DISCONTINUED | OUTPATIENT
Start: 2023-01-16 | End: 2023-01-16

## 2023-01-15 RX ORDER — MAGNESIUM SULFATE HEPTAHYDRATE 40 MG/ML
INJECTION, SOLUTION INTRAVENOUS
Status: COMPLETED
Start: 2023-01-15 | End: 2023-01-15

## 2023-01-15 RX ORDER — FLUTICASONE FUROATE AND VILANTEROL 100; 25 UG/1; UG/1
1 POWDER RESPIRATORY (INHALATION) DAILY
Status: DISCONTINUED | OUTPATIENT
Start: 2023-01-15 | End: 2023-01-16

## 2023-01-15 RX ORDER — VANCOMYCIN HYDROCHLORIDE 1 G/200ML
1000 INJECTION, SOLUTION INTRAVENOUS EVERY 24 HOURS
Status: DISCONTINUED | OUTPATIENT
Start: 2023-01-16 | End: 2023-01-16

## 2023-01-15 RX ORDER — DEXTROSE MONOHYDRATE 25 G/50ML
25-50 INJECTION, SOLUTION INTRAVENOUS
Status: DISCONTINUED | OUTPATIENT
Start: 2023-01-15 | End: 2023-01-22 | Stop reason: HOSPADM

## 2023-01-15 RX ORDER — PIPERACILLIN SODIUM, TAZOBACTAM SODIUM 4; .5 G/20ML; G/20ML
4.5 INJECTION, POWDER, LYOPHILIZED, FOR SOLUTION INTRAVENOUS ONCE
Status: COMPLETED | OUTPATIENT
Start: 2023-01-15 | End: 2023-01-15

## 2023-01-15 RX ORDER — POLYETHYLENE GLYCOL 3350 17 G/17G
17 POWDER, FOR SOLUTION ORAL DAILY PRN
Status: DISCONTINUED | OUTPATIENT
Start: 2023-01-15 | End: 2023-01-22 | Stop reason: HOSPADM

## 2023-01-15 RX ORDER — ASPIRIN 81 MG/1
81 TABLET ORAL DAILY
Status: DISCONTINUED | OUTPATIENT
Start: 2023-01-16 | End: 2023-01-22 | Stop reason: HOSPADM

## 2023-01-15 RX ORDER — PROCHLORPERAZINE MALEATE 5 MG
5 TABLET ORAL EVERY 6 HOURS PRN
Status: DISCONTINUED | OUTPATIENT
Start: 2023-01-15 | End: 2023-01-22 | Stop reason: HOSPADM

## 2023-01-15 RX ADMIN — SODIUM CHLORIDE 1000 ML: 9 INJECTION, SOLUTION INTRAVENOUS at 08:22

## 2023-01-15 RX ADMIN — SODIUM CHLORIDE, POTASSIUM CHLORIDE, SODIUM LACTATE AND CALCIUM CHLORIDE 2000 ML: 600; 310; 30; 20 INJECTION, SOLUTION INTRAVENOUS at 03:29

## 2023-01-15 RX ADMIN — PIPERACILLIN AND TAZOBACTAM 4.5 G: 4; .5 INJECTION, POWDER, FOR SOLUTION INTRAVENOUS at 04:04

## 2023-01-15 RX ADMIN — MAGNESIUM SULFATE HEPTAHYDRATE 2 G: 40 INJECTION, SOLUTION INTRAVENOUS at 03:18

## 2023-01-15 RX ADMIN — NALOXONE HYDROCHLORIDE 0.4 MG: 0.4 INJECTION, SOLUTION INTRAMUSCULAR; INTRAVENOUS; SUBCUTANEOUS at 05:30

## 2023-01-15 RX ADMIN — IPRATROPIUM BROMIDE AND ALBUTEROL SULFATE 3 ML: .5; 3 SOLUTION RESPIRATORY (INHALATION) at 03:21

## 2023-01-15 RX ADMIN — ACETAMINOPHEN 975 MG: 325 TABLET, FILM COATED ORAL at 20:23

## 2023-01-15 RX ADMIN — IPRATROPIUM BROMIDE AND ALBUTEROL SULFATE 3 ML: 2.5; .5 SOLUTION RESPIRATORY (INHALATION) at 08:34

## 2023-01-15 RX ADMIN — SODIUM CHLORIDE 88 ML: 900 INJECTION INTRAVENOUS at 03:42

## 2023-01-15 RX ADMIN — METHYLPREDNISOLONE SODIUM SUCCINATE 125 MG: 125 INJECTION, POWDER, FOR SOLUTION INTRAMUSCULAR; INTRAVENOUS at 03:18

## 2023-01-15 RX ADMIN — LIDOCAINE 1 PATCH: 560 PATCH PERCUTANEOUS; TOPICAL; TRANSDERMAL at 11:04

## 2023-01-15 RX ADMIN — LORAZEPAM 0.5 MG: 2 INJECTION INTRAMUSCULAR; INTRAVENOUS at 15:06

## 2023-01-15 RX ADMIN — DULOXETINE HYDROCHLORIDE 20 MG: 20 CAPSULE, DELAYED RELEASE ORAL at 20:23

## 2023-01-15 RX ADMIN — PIPERACILLIN AND TAZOBACTAM 4.5 G: 4; .5 INJECTION, POWDER, FOR SOLUTION INTRAVENOUS at 15:07

## 2023-01-15 RX ADMIN — PIPERACILLIN AND TAZOBACTAM 4.5 G: 4; .5 INJECTION, POWDER, FOR SOLUTION INTRAVENOUS at 21:14

## 2023-01-15 RX ADMIN — IPRATROPIUM BROMIDE AND ALBUTEROL SULFATE 3 ML: 2.5; .5 SOLUTION RESPIRATORY (INHALATION) at 11:25

## 2023-01-15 RX ADMIN — PIPERACILLIN AND TAZOBACTAM 4.5 G: 4; .5 INJECTION, POWDER, FOR SOLUTION INTRAVENOUS at 09:01

## 2023-01-15 RX ADMIN — IPRATROPIUM BROMIDE AND ALBUTEROL SULFATE 3 ML: 2.5; .5 SOLUTION RESPIRATORY (INHALATION) at 19:37

## 2023-01-15 RX ADMIN — MAGNESIUM SULFATE HEPTAHYDRATE 2 G: 40 INJECTION, SOLUTION INTRAVENOUS at 08:53

## 2023-01-15 RX ADMIN — VANCOMYCIN HYDROCHLORIDE 1500 MG: 10 INJECTION, POWDER, LYOPHILIZED, FOR SOLUTION INTRAVENOUS at 04:14

## 2023-01-15 RX ADMIN — NALOXONE HYDROCHLORIDE 0.3 MG/HR: 1 INJECTION PARENTERAL at 06:08

## 2023-01-15 RX ADMIN — NALOXONE HYDROCHLORIDE 0.1 MG: 0.4 INJECTION, SOLUTION INTRAMUSCULAR; INTRAVENOUS; SUBCUTANEOUS at 04:29

## 2023-01-15 RX ADMIN — IPRATROPIUM BROMIDE AND ALBUTEROL SULFATE 3 ML: 2.5; .5 SOLUTION RESPIRATORY (INHALATION) at 15:07

## 2023-01-15 RX ADMIN — IOPAMIDOL 63 ML: 755 INJECTION, SOLUTION INTRAVENOUS at 03:42

## 2023-01-15 ASSESSMENT — ACTIVITIES OF DAILY LIVING (ADL)
CHANGE_IN_FUNCTIONAL_STATUS_SINCE_ONSET_OF_CURRENT_ILLNESS/INJURY: YES
ADLS_ACUITY_SCORE: 35
ADLS_ACUITY_SCORE: 35
EQUIPMENT_CURRENTLY_USED_AT_HOME: WALKER, ROLLING
CONCENTRATING,_REMEMBERING_OR_MAKING_DECISIONS_DIFFICULTY: NO
ADLS_ACUITY_SCORE: 50
ADLS_ACUITY_SCORE: 50
DIFFICULTY_EATING/SWALLOWING: NO
TRANSFERRING: 0-->ASSISTANCE NEEDED (DEVELOPMENTALLY APPROPRIATE)
ADLS_ACUITY_SCORE: 50
WEAR_GLASSES_OR_BLIND: YES
ADLS_ACUITY_SCORE: 37
WALKING_OR_CLIMBING_STAIRS: STAIR CLIMBING DIFFICULTY, REQUIRES EQUIPMENT;AMBULATION DIFFICULTY, REQUIRES EQUIPMENT
NUMBER_OF_TIMES_PATIENT_HAS_FALLEN_WITHIN_LAST_SIX_MONTHS: 2
FALL_HISTORY_WITHIN_LAST_SIX_MONTHS: YES
ADLS_ACUITY_SCORE: 50
ADLS_ACUITY_SCORE: 35
ADLS_ACUITY_SCORE: 50
VISION_MANAGEMENT: GLASSES
TRANSFERRING: 1-->ASSISTANCE (EQUIPMENT/PERSON) NEEDED
TOILETING_ISSUES: NO
DRESSING/BATHING_DIFFICULTY: NO
WALKING_OR_CLIMBING_STAIRS_DIFFICULTY: YES
ADLS_ACUITY_SCORE: 30
DOING_ERRANDS_INDEPENDENTLY_DIFFICULTY: NO

## 2023-01-15 NOTE — ED NOTES
Pt. Became more sleepy when speaking with MD and O2 sats decreased to 85%. Narcan 0.1 given as reorder from Dr. Brooks. Pt. Given 100% boost on BiPAP machine and turned Fio2 up to 50%

## 2023-01-15 NOTE — PROGRESS NOTES
Brief update:     Brief update, admission history and physical pending      Patient represents to the hospital after recent discharge for respiratory failure in the setting of rib fractures    Patient became more short of breath and obtunded at home, brought to the emergency department as was found to have systolic blood pressures in the 60s, profound hypoxia.  Appears to have some degree of volume depletion with hypotension worsened by narcotic medications    Requiring BiPAP support for hypoxia, initially 100% FiO2, able to wean to 50% FiO2, though occasionally still requiring additional support despite BiPAP    Patient did respond to Narcan with several doses including an increase in his blood pressure as well as a increase in his mentation.  Raises suspicion for narcotic contribution to both hypotension and respiratory failure.    Patient received 2 L normal saline bolus, though had subsequent worsening in his respiratory status in the emergency department.  His blood pressures ranged from 80 systolic to 100 systolic, again improved with Narcan.    With tenuous cardiopulmonary status in the emergency department, discussed at length with patient as well as his wife, Adeola, at bedside my guarded prognosis.  Patient appeared to be actively dying at 1 point in the emergency department with blood pressures dropping and unable to oxygenate despite 100% FiO2.    When patient was more alert I was able to confirm that he would not want initiation of pressors.  He is DNR/DNI.  Currently, he still desires attempts at restorative care with BiPAP, even in the setting of discomfort associated with his rib fractures and need to hold his pain medications currently.  I did discuss that if he were to continue to decompensate, and appeared at all uncomfortable, I would switch his treatment to a comfort based approach and provide medications for dyspnea and pain as felt indicated.  He agreed to this as well.    Discussed with  patient's wife at bedside, who recognizes his decline since his hip fracture 2 years ago, though more pronounced now.  She also feels that he is likely dying, and would support a comfort based approach.  This said, recognizes that patient himself is not ready for a fully comfort based approach.  Discussed also with patient's sister, who will be coming to visit him.  I also called the patient's daughter, Amrita, who is currently ill with a sore throat, hoarseness, cough, though no fevers.  She was able to FaceTime with her father in the emergency department.    Generally tenuous status as will be difficult to administer more fluid unless his respiratory status improves.  Discussed with ICU team.  Could trial off of BiPAP with high flow nasal cannula if his oxygenation allows to improve blood pressures, though even in the emergency department there was some difficulty with hypoxia despite high FiO2 BiPAP.    In my opinion, it would not be unreasonable to allow family to visit for compassionate care.  I told the patient himself that I was concerned that he might die in the next 12 to 24 hours.    Elevated procalcitonin.  Treating for healthcare associated pneumonia  IV vancomycin and Zosyn    ICU team aware of patient.  Currently in the ICU for Narcan drip.    If patient stabilizes and is able to transfer to the floor, consider pulmonary consultation    Mike Brooks MD  7:30 AM

## 2023-01-15 NOTE — ED TRIAGE NOTES
Pt. Recently discharged from [facility] for left rib fracture. History of lung CA. Pt. Got up tonight to get a neb and fell. Wife reports to EMS his sats were in the 70s.

## 2023-01-15 NOTE — PROGRESS NOTES
Brief, no charge note. Patient admitted by my colleague Dr. Brooks early this morning (see H&P)    Harrison Thomas is a 77 year old male with a complex PMH including thyroid CA (s/p thyroidectomy), severe COPD, CAD, HTN, DLD, RA, BRENNEN, RUL lung mass (probable malignancy) who was recently admitted from 1/9 to 1/14/23 following a mechanical fall with left 8th rib fracture and evaluated for shortness of breath. He was evaluated by pulmonology, treated for likely community acquired pneumonia. He was discharged on 1/14/23 with PO Ceftin and pain control for rib fracture with prn oxycodone, flexeril and lidocaine patch.    He was brought back to ED on 1/15/23 with altered mental status and a fall. He was noted to be significantly hypoxic, obtunded and hypotensive to SBP 80s. On admission noted worsened cr 1.22; lactic acid 7.6--> 4.2; BNP 2374, pro calcitonin 1.43; normal troponin, Blood glucose 66. Was noted     Patient seen and examined in ICU  -he is on BiPAP  -is alert awake and conversant but seems to be slightly dyspneic  -lung sounds clear  - SBP in 110s    - CT head showed no acute intracranial process  - CT chest showed no PE and grossly unchanged masslike consolidation in the inferior right upper lobe and nodular consolidation in the right lower lobe, unchanged indexed pulmonary nodules  - started on empiric vancomycin and Zosyn for likely healthcare associated pneumonia with sepsis  - started on BiPAP support  - hypertension responded to IV fluids/boluses  - got several doses of Narcan and eventually started on Narcan drip and mentation seems to be improving, thus some concern for likely opioid overdose    -Will continue ICU monitoring  -wean off BiPAP as able  -continue Narcan drip; wean off as able  -continue empiric vancomycin and Zosyn  -will consider starting maintenance IV fluid if becomes hypotensive again; patient wishes for no pressors or intubation; is DNR/DNI; goals of care was discussed by Dr. Brooks  with patient and family and they wish for [facility] if he were to decompensate  -monitor CBC and BMP    Please see H&P by Dr. Brooks for full assessment and plan

## 2023-01-15 NOTE — PHARMACY-VANCOMYCIN DOSING SERVICE
"Pharmacy Vancomycin Initial Note  Date of Service January 15, 2023  Patient's  1945  77 year old, male    Indication: Healthcare-Associated Pneumonia    Current estimated CrCl = Estimated Creatinine Clearance: 48.5 mL/min (based on SCr of 1.22 mg/dL).    Creatinine for last 3 days  2023:  6:27 AM Creatinine 0.73 mg/dL  2023:  6:32 AM Creatinine 0.73 mg/dL  1/15/2023:  3:22 AM Creatinine 1.22 mg/dL    Recent Vancomycin Level(s) for last 3 days  No results found for requested labs within last 72 hours.      Vancomycin IV Administrations (past 72 hours)                   vancomycin (VANCOCIN) 1,500 mg in 0.9% NaCl 250 mL intermittent infusion (mg) 1,500 mg New Bag 01/15/23 0414                Nephrotoxins and other renal medications (From now, onward)    Start     Dose/Rate Route Frequency Ordered Stop    23 0400  vancomycin (VANCOCIN) 1000 mg in dextrose 5% 200 mL PREMIX         1,000 mg  200 mL/hr over 1 Hours Intravenous EVERY 24 HOURS 01/15/23 0748      01/15/23 1000  piperacillin-tazobactam (ZOSYN) 4.5 g vial to attach to  mL bag        Note to Pharmacy: For [facility], [facility] and [facility]: For Zosyn-naive patients, use the \"Zosyn initial dose + extended infusion\" order panel.    4.5 g  over 30 Minutes Intravenous EVERY 6 HOURS 01/15/23 0723            Contrast Orders - past 72 hours (72h ago, onward)    Start     Dose/Rate Route Frequency Stop    01/15/23 0340  iopamidol (ISOVUE-370) solution 63 mL         63 mL Intravenous ONCE 01/15/23 0342          [employer] Prediction of Planned Initial Vancomycin Regimen  Regimen: 1000 mg IV every 24 hours.  Start time: 04:00 on 2023  Exposure target: AUC24 (range)400-600 mg/L.hr   AUC24,ss: 413 mg/L.hr  Probability of AUC24 > 400: 54 %  Ctrough,ss: 12.3 mg/L  Probability of Ctrough,ss > 20: 11 %  Probability of nephrotoxicity (Lodise GARCÍA ): 8 %        Plan:  1. Start vancomycin  1000 mg IV q24h.   2. Vancomycin monitoring method: AUC  3. Vancomycin " therapeutic monitoring goal: 400-600 mg*h/L  4. Pharmacy will check vancomycin levels as appropriate in 1-3 Days.    5. Serum creatinine levels will be ordered daily for the first week of therapy and at least twice weekly for subsequent weeks.      Rhea Brown, ChingD, BCPS

## 2023-01-15 NOTE — ED NOTES
MD Brooks in room communicating with wife, who is also on the phone with family about updates. Pt. Is alert and oriented after last bump of narcan. Narcan drip ordered.
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
## 2023-01-15 NOTE — ED NOTES
Pt. Opens eyes spontaneously, asks about mask needing to be on or not? Pt and wife advised to keep mask on until MD assessment.

## 2023-01-15 NOTE — PLAN OF CARE
Periods of lethargy improved with nap and bipap use. Ativan x1 given for anxiety. A&O, sanchez. Up to chair with lift-pending therapy evals. SR with bp in range. 1L IVF given for soft bp and low uop this am. Bscanx2 and straight cathed x2. Bipap on 21% vs. 2LNC given LOC and WOB needed to be placed back on x1 throughout the shift period for a few hours after coming off bipap around 10am this morning. Nonproductive cough. PP+ skin warm, poor skin turgor, fragile skin- fissure starting in gluteal crack/fold prep applied and foam to coccyx. Turned and hygiene measures provided. BS in normal range. Tolerating oral liquids but held in between needing bipap. All care explained throughout the shift period to family at bedside. Up watching the [employer] game with family and interactive this evening.

## 2023-01-15 NOTE — ED NOTES
Wife here at this time. Pt. Denies pain, is able to answer yes no questions. Pt. Will open eyes when talking to him.
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
## 2023-01-15 NOTE — PHARMACY-ADMISSION MEDICATION HISTORY
Pharmacy Medication History  Admission medication history interview status for the 1/15/2023  admission is complete. See EPIC admission navigator for prior to admission medications     Medication history sources: Reviewed recent discharge summary    Medication reconciliation completed by provider prior to medication history? No    Time spent in this activity: 15 minutes    Prior to Admission medications    Medication Sig Last Dose Taking? Auth Provider Long Term End Date   acetaminophen (TYLENOL) 325 MG tablet Take 3 tablets (975 mg) by mouth 3 times daily  Yes Jaida Ayala MD     albuterol (ACCUNEB) 1.25 MG/3ML neb solution Take 1 vial (1.25 mg) by nebulization every 4 hours as needed for shortness of breath or wheezing  Yes Khoa Handy MD Yes    albuterol (PROAIR HFA/PROVENTIL HFA/VENTOLIN HFA) 108 (90 Base) MCG/ACT inhaler INHALE 2 PUFFS BY MOUTH EVERY 6 HOURS AS NEEDED FOR WHEEZE OR FOR SHORTNESS OF BREATH  Yes Yaneli Dozier MD Yes    amLODIPine (NORVASC) 5 MG tablet Take 5 mg by mouth daily  Yes Yaneli Dozier MD Yes    ascorbic acid 1000 MG TABS tablet Take 1,000 mg by mouth daily  Yes Unknown, Entered By History     aspirin 81 MG tablet Take 1 tablet by mouth 2 times daily  Yes Reported, Patient     calcium carbonate (OS-KARLIE) 1500 (600 Ca) MG tablet Take 600 mg by mouth 2 times daily (with meals)  Yes Unknown, Entered By History     cefuroxime (CEFTIN) 500 MG tablet Take 1 tablet (500 mg) by mouth 2 times daily for 5 days  Yes Josiah Rodriguez MD  1/19/23   cholecalciferol 25 MCG (1000 UT) TABS Take 1,000 Units by mouth daily   Yes Reported, Patient     cyclobenzaprine (FLEXERIL) 5 MG tablet Take 1 tablet (5 mg) by mouth every 8 hours as needed for muscle spasms  Yes Josiah Rodriguez MD     DULoxetine (CYMBALTA) 20 MG capsule Take 1 capsule (20 mg) by mouth 2 times daily  Yes Yaneli Dozier MD Yes    Fluticasone-Umeclidin-Vilanterol (TRELEGY ELLIPTA) 100-62.5-25 MCG/INH oral inhaler  Inhale 1 puff into the lungs daily  Yes Yaneli Dozier MD     furosemide (LASIX) 20 MG tablet Take 1 tablet (20 mg) by mouth 2 times daily In morning and at noon  Yes Yaneli Dozier MD Yes    guaiFENesin (MUCINEX) 600 MG 12 hr tablet Take 600 mg by mouth 2 times daily as needed for congestion  Yes Reported, Patient     hydrOXYzine (ATARAX) 10 MG tablet Take 1 tablet (10 mg) by mouth 3 times daily as needed for itching  Yes Yaneli Dozier MD     levothyroxine (SYNTHROID/LEVOTHROID) 137 MCG tablet Take 1 tablet (137 mcg) by mouth daily , starting on 9/23/21  Yes Demarcus Simmons MD Yes    Lidocaine (LIDOCARE) 4 % Patch Place 1 patch onto the skin every 24 hours for 10 days To prevent lidocaine toxicity, patient should be patch free for 12 hrs daily.  Yes Josiah Rodriguez MD No 1/24/23   metoprolol succinate ER (TOPROL XL) 50 MG 24 hr tablet Take 1 tablet (50 mg) by mouth daily Takes in AM  Yes Yaneli Dozier MD Yes    multivitamin w/minerals (THERA-VIT-M) tablet Take 1 tablet by mouth daily   Yes Reported, Patient     naloxone (NARCAN) 4 MG/0.1ML nasal spray Spray 1 spray (4 mg) into one nostril alternating nostrils once as needed for opioid reversal every 2-3 minutes until assistance arrives  Yes Yaneli Dozier MD Yes    nitroGLYcerin (NITROSTAT) 0.4 MG sublingual tablet FOR CHEST PAIN PLACE 1 TAB UNDER TONGUE EVERY 5 MIN FOR 3 DOSES. IF SYMPTOMS PERSIST 5 MIN AFTER 1ST DOSE CALL 911  Yes Yaneli Dozier MD Yes    oxyCODONE (ROXICODONE) 5 MG tablet Take 1 tablet (5 mg) by mouth 3 times daily as needed for moderate to severe pain  Yes Yaneli Dozier MD No    rosuvastatin (CRESTOR) 10 MG tablet TAKE 1 TABLET BY MOUTH EVERY DAY  Yes Yaneli Dozier MD Yes    senna-docusate (SENOKOT-S/PERICOLACE) 8.6-50 MG tablet Take 1 tablet by mouth 2 times daily as needed for constipation  Yes Unknown, Entered By History         The information provided in this note is only as accurate as the sources available at the time of  update(s)

## 2023-01-15 NOTE — PROGRESS NOTES
Placed patient on BIPAP 16/8 Rate of 14 and 100% FIO2 per MD request. Will continue to monitor patient.

## 2023-01-16 ENCOUNTER — MEDICAL CORRESPONDENCE (OUTPATIENT)
Dept: HEALTH INFORMATION MANAGEMENT | Facility: CLINIC | Age: 78
End: 2023-01-16

## 2023-01-16 ENCOUNTER — APPOINTMENT (OUTPATIENT)
Dept: CARDIOLOGY | Facility: CLINIC | Age: 78
DRG: 871 | End: 2023-01-16
Attending: HOSPITALIST
Payer: MEDICARE

## 2023-01-16 ENCOUNTER — APPOINTMENT (OUTPATIENT)
Dept: OCCUPATIONAL THERAPY | Facility: CLINIC | Age: 78
DRG: 871 | End: 2023-01-16
Attending: HOSPITALIST
Payer: MEDICARE

## 2023-01-16 LAB
ANION GAP SERPL CALCULATED.3IONS-SCNC: 10 MMOL/L (ref 3–14)
BASE EXCESS BLDV CALC-SCNC: -2.9 MMOL/L (ref -7.7–1.9)
BASOPHILS # BLD AUTO: 0 10E3/UL (ref 0–0.2)
BASOPHILS NFR BLD AUTO: 0 %
BUN SERPL-MCNC: 16 MG/DL (ref 7–30)
CALCIUM SERPL-MCNC: 7.1 MG/DL (ref 8.5–10.1)
CHLORIDE BLD-SCNC: 110 MMOL/L (ref 94–109)
CO2 SERPL-SCNC: 22 MMOL/L (ref 20–32)
CREAT SERPL-MCNC: 1.01 MG/DL (ref 0.66–1.25)
EOSINOPHIL # BLD AUTO: 1.1 10E3/UL (ref 0–0.7)
EOSINOPHIL NFR BLD AUTO: 10 %
ERYTHROCYTE [DISTWIDTH] IN BLOOD BY AUTOMATED COUNT: 17.4 % (ref 10–15)
GFR SERPL CREATININE-BSD FRML MDRD: 78 ML/MIN/1.73M2
GLUCOSE BLD-MCNC: 115 MG/DL (ref 70–99)
GLUCOSE BLDC GLUCOMTR-MCNC: 113 MG/DL (ref 70–99)
GLUCOSE BLDC GLUCOMTR-MCNC: 141 MG/DL (ref 70–99)
HCO3 BLDV-SCNC: 23 MMOL/L (ref 21–28)
HCT VFR BLD AUTO: 32.9 % (ref 40–53)
HGB BLD-MCNC: 10.9 G/DL (ref 13.3–17.7)
IMM GRANULOCYTES # BLD: 0.1 10E3/UL
IMM GRANULOCYTES NFR BLD: 1 %
LVEF ECHO: NORMAL
LYMPHOCYTES # BLD AUTO: 0.6 10E3/UL (ref 0.8–5.3)
LYMPHOCYTES NFR BLD AUTO: 5 %
MCH RBC QN AUTO: 29.1 PG (ref 26.5–33)
MCHC RBC AUTO-ENTMCNC: 33.1 G/DL (ref 31.5–36.5)
MCV RBC AUTO: 88 FL (ref 78–100)
MONOCYTES # BLD AUTO: 1.1 10E3/UL (ref 0–1.3)
MONOCYTES NFR BLD AUTO: 9 %
NEUTROPHILS # BLD AUTO: 8.8 10E3/UL (ref 1.6–8.3)
NEUTROPHILS NFR BLD AUTO: 75 %
NRBC # BLD AUTO: 0 10E3/UL
NRBC BLD AUTO-RTO: 0 /100
O2/TOTAL GAS SETTING VFR VENT: 21 %
OXYHGB MFR BLDV: 62 % (ref 70–75)
PCO2 BLDV: 41 MM HG (ref 40–50)
PH BLDV: 7.35 [PH] (ref 7.32–7.43)
PLATELET # BLD AUTO: 94 10E3/UL (ref 150–450)
PO2 BLDV: 36 MM HG (ref 25–47)
POTASSIUM BLD-SCNC: 3.6 MMOL/L (ref 3.4–5.3)
RBC # BLD AUTO: 3.74 10E6/UL (ref 4.4–5.9)
SODIUM SERPL-SCNC: 142 MMOL/L (ref 133–144)
WBC # BLD AUTO: 11.7 10E3/UL (ref 4–11)

## 2023-01-16 PROCEDURE — 250N000013 HC RX MED GY IP 250 OP 250 PS 637: Performed by: HOSPITALIST

## 2023-01-16 PROCEDURE — 120N000001 HC R&B MED SURG/OB

## 2023-01-16 PROCEDURE — 97166 OT EVAL MOD COMPLEX 45 MIN: CPT | Mod: GO | Performed by: OCCUPATIONAL THERAPIST

## 2023-01-16 PROCEDURE — 255N000002 HC RX 255 OP 636: Performed by: STUDENT IN AN ORGANIZED HEALTH CARE EDUCATION/TRAINING PROGRAM

## 2023-01-16 PROCEDURE — 36415 COLL VENOUS BLD VENIPUNCTURE: CPT | Performed by: HOSPITALIST

## 2023-01-16 PROCEDURE — 250N000013 HC RX MED GY IP 250 OP 250 PS 637: Performed by: INTERNAL MEDICINE

## 2023-01-16 PROCEDURE — 80048 BASIC METABOLIC PNL TOTAL CA: CPT | Performed by: HOSPITALIST

## 2023-01-16 PROCEDURE — 250N000009 HC RX 250

## 2023-01-16 PROCEDURE — 250N000011 HC RX IP 250 OP 636: Performed by: INTERNAL MEDICINE

## 2023-01-16 PROCEDURE — 250N000009 HC RX 250: Performed by: INTERNAL MEDICINE

## 2023-01-16 PROCEDURE — 99233 SBSQ HOSP IP/OBS HIGH 50: CPT | Performed by: HOSPITALIST

## 2023-01-16 PROCEDURE — 97535 SELF CARE MNGMENT TRAINING: CPT | Mod: GO | Performed by: OCCUPATIONAL THERAPIST

## 2023-01-16 PROCEDURE — 999N000208 ECHOCARDIOGRAM COMPLETE

## 2023-01-16 PROCEDURE — 250N000009 HC RX 250: Performed by: PHYSICIAN ASSISTANT

## 2023-01-16 PROCEDURE — 94660 CPAP INITIATION&MGMT: CPT

## 2023-01-16 PROCEDURE — 99221 1ST HOSP IP/OBS SF/LOW 40: CPT | Mod: FS | Performed by: STUDENT IN AN ORGANIZED HEALTH CARE EDUCATION/TRAINING PROGRAM

## 2023-01-16 PROCEDURE — 999N000157 HC STATISTIC RCP TIME EA 10 MIN

## 2023-01-16 PROCEDURE — 94640 AIRWAY INHALATION TREATMENT: CPT

## 2023-01-16 PROCEDURE — 85025 COMPLETE CBC W/AUTO DIFF WBC: CPT | Performed by: HOSPITALIST

## 2023-01-16 PROCEDURE — 82805 BLOOD GASES W/O2 SATURATION: CPT | Performed by: INTERNAL MEDICINE

## 2023-01-16 PROCEDURE — 250N000011 HC RX IP 250 OP 636: Performed by: HOSPITALIST

## 2023-01-16 PROCEDURE — 93306 TTE W/DOPPLER COMPLETE: CPT | Mod: 26 | Performed by: INTERNAL MEDICINE

## 2023-01-16 RX ORDER — IPRATROPIUM BROMIDE AND ALBUTEROL SULFATE 2.5; .5 MG/3ML; MG/3ML
3 SOLUTION RESPIRATORY (INHALATION) EVERY 4 HOURS PRN
Status: DISCONTINUED | OUTPATIENT
Start: 2023-01-16 | End: 2023-01-22 | Stop reason: HOSPADM

## 2023-01-16 RX ORDER — LIDOCAINE HYDROCHLORIDE 20 MG/ML
JELLY TOPICAL ONCE
Status: COMPLETED | OUTPATIENT
Start: 2023-01-16 | End: 2023-01-16

## 2023-01-16 RX ORDER — METOPROLOL SUCCINATE 50 MG/1
50 TABLET, EXTENDED RELEASE ORAL DAILY
Status: DISCONTINUED | OUTPATIENT
Start: 2023-01-17 | End: 2023-01-22 | Stop reason: HOSPADM

## 2023-01-16 RX ORDER — TAMSULOSIN HYDROCHLORIDE 0.4 MG/1
0.4 CAPSULE ORAL AT BEDTIME
Status: DISCONTINUED | OUTPATIENT
Start: 2023-01-16 | End: 2023-01-22 | Stop reason: HOSPADM

## 2023-01-16 RX ORDER — LIDOCAINE HYDROCHLORIDE 20 MG/ML
JELLY TOPICAL
Status: COMPLETED
Start: 2023-01-16 | End: 2023-01-16

## 2023-01-16 RX ADMIN — LIDOCAINE HYDROCHLORIDE: 20 JELLY TOPICAL at 03:32

## 2023-01-16 RX ADMIN — PIPERACILLIN AND TAZOBACTAM 4.5 G: 4; .5 INJECTION, POWDER, FOR SOLUTION INTRAVENOUS at 09:10

## 2023-01-16 RX ADMIN — ACETAMINOPHEN 975 MG: 325 TABLET, FILM COATED ORAL at 12:32

## 2023-01-16 RX ADMIN — HUMAN ALBUMIN MICROSPHERES AND PERFLUTREN 9 ML: 10; .22 INJECTION, SOLUTION INTRAVENOUS at 11:21

## 2023-01-16 RX ADMIN — LIDOCAINE HYDROCHLORIDE: 20 JELLY TOPICAL at 15:02

## 2023-01-16 RX ADMIN — LEVOTHYROXINE SODIUM 137 MCG: 112 TABLET ORAL at 08:25

## 2023-01-16 RX ADMIN — TAMSULOSIN HYDROCHLORIDE 0.4 MG: 0.4 CAPSULE ORAL at 21:20

## 2023-01-16 RX ADMIN — VANCOMYCIN HYDROCHLORIDE 1000 MG: 1 INJECTION, SOLUTION INTRAVENOUS at 04:18

## 2023-01-16 RX ADMIN — PIPERACILLIN AND TAZOBACTAM 4.5 G: 4; .5 INJECTION, POWDER, FOR SOLUTION INTRAVENOUS at 03:30

## 2023-01-16 RX ADMIN — ROSUVASTATIN CALCIUM 10 MG: 10 TABLET, FILM COATED ORAL at 08:25

## 2023-01-16 RX ADMIN — ACETAMINOPHEN 975 MG: 325 TABLET, FILM COATED ORAL at 19:56

## 2023-01-16 RX ADMIN — ASPIRIN 81 MG: 81 TABLET, COATED ORAL at 08:25

## 2023-01-16 RX ADMIN — DULOXETINE HYDROCHLORIDE 20 MG: 20 CAPSULE, DELAYED RELEASE ORAL at 08:26

## 2023-01-16 RX ADMIN — PIPERACILLIN AND TAZOBACTAM 4.5 G: 4; .5 INJECTION, POWDER, FOR SOLUTION INTRAVENOUS at 21:20

## 2023-01-16 RX ADMIN — TAMSULOSIN HYDROCHLORIDE 0.4 MG: 0.4 CAPSULE ORAL at 03:30

## 2023-01-16 RX ADMIN — PIPERACILLIN AND TAZOBACTAM 4.5 G: 4; .5 INJECTION, POWDER, FOR SOLUTION INTRAVENOUS at 16:06

## 2023-01-16 RX ADMIN — IPRATROPIUM BROMIDE AND ALBUTEROL SULFATE 3 ML: 2.5; .5 SOLUTION RESPIRATORY (INHALATION) at 08:07

## 2023-01-16 RX ADMIN — DULOXETINE HYDROCHLORIDE 20 MG: 20 CAPSULE, DELAYED RELEASE ORAL at 21:20

## 2023-01-16 ASSESSMENT — ACTIVITIES OF DAILY LIVING (ADL)
ADLS_ACUITY_SCORE: 28
ADLS_ACUITY_SCORE: 36
ADLS_ACUITY_SCORE: 35
ADLS_ACUITY_SCORE: 36
ADLS_ACUITY_SCORE: 36
ADLS_ACUITY_SCORE: 28
ADLS_ACUITY_SCORE: 35
ADLS_ACUITY_SCORE: 38
ADLS_ACUITY_SCORE: 35
ADLS_ACUITY_SCORE: 30
ADLS_ACUITY_SCORE: 28
ADLS_ACUITY_SCORE: 36

## 2023-01-16 NOTE — PROGRESS NOTES
01/16/23 1100   Appointment Info   Signing Clinician's Name / Credentials (OT) Jo-Ann Mis OTRL   Living Environment   People in Home spouse   Current Living Arrangements house   Home Accessibility stairs to enter home   Number of Stairs, Main Entrance 1   Stair Railings, Main Entrance none   Transportation Anticipated family or friend will provide   Living Environment Comments Pt has a tub shower with grab bar and chair. Standard toilet with grab bar and countertop   Self-Care   Usual Activity Tolerance moderate   Current Activity Tolerance fair   Equipment Currently Used at Home walker, rolling   Fall history within last six months yes   Number of times patient has fallen within last six months 2   Activity/Exercise/Self-Care Comment Pt was independent at baseline with family assist for shower transfers and IADLs   Instrumental Activities of Daily Living (IADL)   IADL Comments Pt family completes IADLs at baseline   General Information   Onset of Illness/Injury or Date of Surgery 01/15/23   Referring Physician Josiah Rodriguez MD   Patient/Family Therapy Goal Statement (OT) Patient would like to return home, family and pt deny TCU at this time   Additional Occupational Profile Info/Pertinent History of Current Problem 77 year old male with a complex PMH including thyroid CA (s/p thyroidectomy), severe COPD, CAD, HTN, DLD, RA, BRENNEN, RUL lung mass (probable malignancy) who was recently admitted from 1/9 to 1/14/23 following a  mechanical  fall with left 8th rib fracture and evaluated for shortness of breath . He was evaluated by pulmonology, treated for likely community acquired pneumonia .  He was discharged on 1/14/23 with PO Ceftin and pain control for rib fracture  with prn oxycodone , flexeril and lidocaine patch.     He was brought back to ED on 1/15/23 with altered mental status and a fall. He was noted to be significantly hypoxic, obtunded and hypotensive to SBP 80s. On admission noted worsened cr  1.22; lactic acid 7.6; BNP 2374, pro calcitonin 1.43; normal troponin, Blood glucose 66.   Existing Precautions/Restrictions fall;oxygen therapy device and L/min  (2L NC at baseline)   Limitations/Impairments safety/cognitive   Cognitive Status Examination   Orientation Status person;time   Affect/Mental Status (Cognitive) confused   Follows Commands follows one-step commands;WFL   Safety Deficit moderate deficit   Memory Deficit moderate deficit   Attention Deficit moderate deficit   Executive Function Deficit moderate deficit   Cognitive Status Comments Pt does not remember the days leading to hospitalization per spouse. Has increased confusion   Visual Perception   Visual Impairment/Limitations corrective lenses full-time   Pain Assessment   Patient Currently in Pain Yes, see Vital Sign flowsheet   Posture   Posture not impaired   Range of Motion Comprehensive   General Range of Motion no range of motion deficits identified   Strength Comprehensive (MMT)   Comment, General Manual Muscle Testing (MMT) Assessment Global strength deficits, poor tolerance for activity   Coordination   Coordination Comments WFL   Bed Mobility   Comment (Bed Mobility) SBA   Transfers   Transfer Comments CGA   Balance   Balance Comments Impaired in standing requiring walker   Activities of Daily Living   BADL Assessment/Intervention bathing;lower body dressing;toileting;grooming   Bathing Assessment/Intervention   Comment, (Bathing) Impaired safety/tolerance with bathing   Lower Body Dressing Assessment/Training   Comment, (Lower Body Dressing) Min assist   Grooming Assessment/Training   Comment, (Grooming) Impaired tolerance for standing g/h   Toileting   Comment, (Toileting) Min assist   Clinical Impression   Criteria for Skilled Therapeutic Interventions Met (OT) Yes, treatment indicated   OT Diagnosis Decline in ADLs and mobility   Influenced by the following impairments Thyroid cancer, lung mass, fall with rib fracture   OT Problem  List-Impairments impacting ADL problems related to;activity tolerance impaired;balance;cognition;mobility;strength;pain   Assessment of Occupational Performance 5 or more Performance Deficits   Identified Performance Deficits Impaired dressing bathing toileting and grooming with cognitive decline   Planned Therapy Interventions (OT) ADL retraining;cognition   Clinical Decision Making Complexity (OT) moderate complexity   Anticipated Equipment Needs Upon Discharge (OT)   (TBD)   Risk & Benefits of therapy have been explained evaluation/treatment results reviewed;care plan/treatment goals reviewed;risks/benefits reviewed;current/potential barriers reviewed;participants voiced agreement with care plan;participants included;patient;spouse/significant other   OT Total Evaluation Time   OT Eval, Moderate Complexity Minutes (03668) 9   OT Goals   Therapy Frequency (OT) Daily   OT Predicted Duration/Target Date for Goal Attainment 01/22/23   OT Goals Hygiene/Grooming;Lower Body Dressing;Toilet Transfer/Toileting;Cognition   OT: Hygiene/Grooming supervision/stand-by assist;while standing  (Pt will tolerate 7+ minutes of standing during g/h to progress activity tolerance needed for aDL independence)   OT: Lower Body Dressing Supervision/stand-by assist   OT: Toilet Transfer/Toileting Modified independent;toilet transfer;cleaning and garment management;using adaptive equipment   OT: Cognitive Patient/caregiver will verbalize understanding of cognitive assessment results/recommendations as needed for safe discharge planning   Self-Care/Home Management   Self-Care/Home Mgmt/ADL, Compensatory, Meal Prep Minutes (02818) 47   Symptoms Noted During/After Treatment (Meal Preparation/Planning Training) fatigue;shortness of breath;significant change in vital signs;increased pain;dizziness   Treatment Detail/Skilled Intervention Ot; PT supine in bed, vitals assessed frequently throughout session due to recent fall due to hypotension and  low O2 sats. Pt completed supine to sit with cues for technique and bed features. Pt completed sit to stand with cues forh and placememt and technique to improve efficency. PT transfered to toilet with CGA and walker with cues for safety with alignment. PT required max assist for clothing management and posterior cares as he requires BUE on walker for safety. Pt reporting significant fatigue and dizziness after toileting, BP dropped to 110s systolic and OP2 dropped to 80s, returned to normal once supine in bed. Pt educatedo n AE for home including tub transfer bench to decrease risk of falling and improve ADL safety. PT verbalized understanding. Pt offered delirium management tools to decrease risk of hospital based delirium. Pt supine in bed with alarm on and call light upon OT deparure   OT Discharge Planning   OT Plan OT: standing tolerance with g/h and chair, monitor vitals, monitor cognition, AE to improve EC during ADLS   OT Discharge Recommendation (DC Rec) home with assist;home with home care occupational therapy   OT Rationale for DC Rec Patient presents below baseline with ADLs and activity tolerance. Pt denies TCU and wishes to return home with family. Pt would benefit from home health OT to progress to prior level of function   OT Brief overview of current status CGA-min assist transfers

## 2023-01-16 NOTE — PLAN OF CARE
Shift 1630-3529: VSS. Neuro: Disoriented to time, but re-directable, no deficits noted neurologically. Pulm: Lungs: clear in upper lobes, diminished in bases bilaterally; on O2 NC. GI/: BS present, BM; Void: WDL output, per urinal. Diet: Regular. Access: PIV's. Denies pain.    AM Shift:  - Pt finally voided; urology was consulted and saw pt in ICU (just prior to pt transferring up to [facility])  - Report given to [facility] RN, pt transferred to [facility]-- I passed along that pt still needs urine culture to be collected

## 2023-01-16 NOTE — PROGRESS NOTES
Cross cover    Called that patient having difficult time voiding.  Attempted to place straight cath but having difficulty.  Patient notes he had some surgery in the past for his prostate but I don't see anything similar listed in the surgical history section in Epic or on the problem list.  Will start flomax and consider urology consult in the morning.

## 2023-01-16 NOTE — PROGRESS NOTES
[facility]  Hospitalist Progress Note        Josiah Rodriguez MD   01/16/2023        Interval History:        - weaned off of BiPAP to 2 L nasal cannula oxygen  - has been off Narcan drip, mentation has improved and stable  - BP stable; no further episodes of hypotension; noted elevated BNP, worsened from last hospitalization ; continue to hold PTA Lasix ; will obtain ECHO  - renal function improved, creatinine--- 1.01; remains afebrile, lactate normalized-- 1.6; WBC slightly up 11.7; blood cultures with no growth so far; will discontinue vancomycin; continue Zosyn  - overnight noted difficulty voiding, needed intermittent straight cath with difficulty; initiated on Flomax; will consult urology  - will transfer out of ICU 1/16/23         Assessment and Plan:        Harrison Thomas is a 77 year old male with a complex PMH including thyroid CA (s/p thyroidectomy), severe COPD, CAD, HTN, DLD, RA, BRENNEN, RUL lung mass (probable malignancy) who was recently admitted from 1/9 to 1/14/23 following a  mechanical  fall with left 8th rib fracture and evaluated for shortness of breath . He was evaluated by pulmonology, treated for likely community acquired pneumonia .  He was discharged on 1/14/23 with PO Ceftin and pain control for rib fracture  with prn oxycodone , flexeril and lidocaine patch.     He was brought back to ED on 1/15/23 with altered mental status and a fall. He was noted to be significantly hypoxic, obtunded and hypotensive to SBP 80s. On admission noted worsened cr 1.22; lactic acid 7.6; BNP 2374, pro calcitonin 1.43; normal troponin, Blood glucose 66.     He follows up with Dr. Dumont and Pulmonology for probable lung cancer with possible radiation pneumonitis. He has been noted with RUL mass like lesion (PET positive). He underwent VATS biopsy (02/2022) by Dr Dumont but was noted to be incomplete due to a lot of scar tissue. He subsequently underwent radiation therapy for enlarging  right upper lobe density.     He was also recently hospitalized in 10/2022 following a fall, noted with pneumonia and/or radiation pneumonitis. Received a prolonged steroid taper at that time. In early December, 2022 he was treated empirically with Ceftin for 7 days (for CT showing scattered micro nodules with concern for possible infection). Several workup for Histo, Blasto, Legionella, Strep and Aspergillus were negative. Last seen in pulmonology clinic on 12/30/2022 at which time he had completed the prolonged steroid taper and had been off Oxygen since 12/18/22.    Probable septic shock  likely healthcare associated pneumonia  acute on chronic hypoxic respiratory failure  lactic acidosis  metabolic encephalopathy, unclear etiology, likely opioid induced  recent admission for Closed fracture of L 8th rib s/p mechanical fall   Severe COPD/ emphysema  -  Has significant pulmonary history including severe COPD, right upper lobe density/mass, h/o radiation pneumonitis and recent hospitalizations as noted (as summarized above, also discussed below)  - as noted above, was hospitalized from 1/9 to 1/14/23 following a  mechanical  fall with left 8th rib fracture and SOB; was evaluated by pulmonology, and given recent worsening in the RUL, suspect likely infectious process; was treated for likely community acquired pneumonia with 4 days of Rocephin and was d/zarbina on PO Ceftin and pain control for rib fracture  with prn oxycodone , flexeril and lidocaine patch  - brought back on 1/15 with AMS and a fall; was noted significantly hypoxic, obtunded and hypotensive to SBP 80s. On admission noted worsened cr 1.22; lactic acid 7.6--> 4.2; BNP 2374, pro calcitonin 1.43; normal troponin, Blood glucose 66    -  on admission CT head showed no acute intracranial process  - repeat CT chest 1/15 showed no PE and grossly unchanged masslike consolidation in the inferior right upper lobe and nodular consolidation in the right lower lobe,  unchanged indexed pulmonary nodules  - started on empiric vancomycin and Zosyn for likely healthcare associated pneumonia with sepsis  - was started on BiPAP support;  hypertension responded to IV fluids/boluses  - got several doses of Narcan and eventually started on Narcan drip with improvement in mentation, thus some concern for likely opioid overdose    - weaned off of BiPAP to 2 L nasal cannula oxygen  - has been off Narcan drip, mentation has improved and stable  - BP stable; no further episodes of hypotension; noted elevated BNP, worsened from last hospitalization ; continue to hold PTA Lasix ; will obtain ECHO 1/16/23  - remains afebrile, lactate normalized- 7.6- ---> 1.6; WBC slightly up 11.7; blood cultures with no growth so far; will discontinue vancomycin (1/16); continue Zosyn  - continue lidocaine patch for pain control from rib fracture  - encourage incentive spirometry; wean oxygen as able; might need some home oxygen  - will switch nebulizers to Q4 PRN ; continue Trelegy Ellipta (therapeutic substitution to Breo Ellipta and Incruse Ellipta)-- patient refusing therapeutic substitution and wants to use his own Trelegy from Home    Abnormal CT chest with RUL density/mass dating back prior to 2/2022, h/o empiric radiation therapy  obstructive sleep apnea-- not using CPAP  H/o radiation pneumonitis  - follows at [facility] (last seen in clinic on 12/30/22) for his combination of severe COPD with emphysema and abnormal chest imaging with incomplete at attempted VATS lung biopsy and radiation for possible thyroid or lung cancer with possible radiation pneumonitis.  - was due to see Dr. Dumont 1/10 but unfortunately got admitted  - CT chest this admission as noted above  - evaluated by pulmonology during last hospitalization (signed off 1/12/23), suggested to continue antibiotics for likely CAP, repeat CT chest without contrast in 6  "weeks    Urinary retention  likely acute renal failure  - renal function improved, creatinine1.22-----> 1.01;  -this admission noted difficulty voiding, needed intermittent straight cath with difficulty; initiated on Flomax (1/16); will consult urology     CAD with hx Cx stenting 1997  HTN  HLD  [amlodipine 5 mg daily, aspirin 81 mg daily, furosemide 20 mg daily, metoprolol XL 50 mg day, rosuvastatin 10 mg daily]  Plan:  - Continue PTA meds  -holding PTA Lasix for now given the need for fluid boluses due to hypertension on presentation; will obtain ECHO  -BP stable; will resume PTA Toprol-XL with hold parameters     Metastatic papillary thyroid cancer  Hypothyroidism  S/p total thyroidectomy 8/2021 with 4/4 LN positive. Treated with radioactive iodine ablation 9/2021.   -  continue  levothyroxine 137 mcg daily        BRENNEN (obstructive sleep apnea)  - does not use CPAP; continue to follow with pulmonology       Rheumatoid arthritis (H)   Follows with rheumatology. Not currently on medications for this    Diet: Regular Diet Adult      DVT Prophylaxis: PCD boots    Code Status: No CPR- Do NOT Intubate      Clinically Significant Risk Factors           # Hypercalcemia: corrected calcium is >10.1, will monitor as appropriate   # Anion Gap Metabolic Acidosis: Highest Anion Gap = 19 mmol/L in last 2 days, will monitor and treat as appropriate  # Hypoalbuminemia: Lowest albumin = 2.3 g/dL at 1/15/2023  3:22 AM, will monitor as appropriate   # Thrombocytopenia: Lowest platelets = 94 in last 2 days, will monitor for bleeding          # Overweight: Estimated body mass index is 28.23 kg/m  as calculated from the following:    Height as of 1/9/23: 1.626 m (5' 4\").    Weight as of this encounter: 74.6 kg (164 lb 7.4 oz)., PRESENT ON ADMISSION          Disposition:   -will transfer out of ICU 1/16/23   -likely 2 to 3 days pending clinical stability   -will get PT/OT re-evaluation     Page Me (7 am to 6 pm)    Care plan discussed " with patient and his wife present by bedside along with nursing              Physical Exam:      Blood pressure 104/54, pulse 77, temperature 97.7  F (36.5  C), temperature source Axillary, resp. rate 20, weight 74.6 kg (164 lb 7.4 oz), SpO2 (!) 89 %.  Vitals:    01/15/23 0711 01/16/23 0200   Weight: 74.9 kg (165 lb 2 oz) 74.6 kg (164 lb 7.4 oz)     Vital Signs with Ranges  Temp:  [97.1  F (36.2  C)-99.1  F (37.3  C)] 97.7  F (36.5  C)  Pulse:  [] 77  Resp:  [20-26] 20  BP: (104-147)/(54-86) 104/54  FiO2 (%):  [2 %-50 %] 21 %  SpO2:  [89 %-98 %] 89 %  I/O's Last 24 hours  I/O last 3 completed shifts:  In: 1955.5 [P.O.:420; I.V.:535.5; IV Piggyback:1000]  Out: 850 [Urine:850]    Constitutional: Alert, awake and oriented ; resting comfortably in no apparent distress   HEENT: Pupils equal and reactive to light and accomodation, neck supple    Oral cavity: Moist mucosa   Cardiovascular: Normal s1 s2, regular rate and rhythm, no murmur   Lungs: Mild bibasal crepts +;  no wheezes; has mild left lower chest tenderness   Abdomen: Soft, nt, nd, no guarding, rigidity or rebound; BS +   LE : No edema   Musculoskeletal: Power 5/5 in all extremities   Neuro: No focal neurological deficits noted   Psychiatry: normal mood and affect                Medications:          acetaminophen  975 mg Oral TID     aspirin  81 mg Oral Daily     DULoxetine  20 mg Oral BID     fluticasone-vilanterol  1 puff Inhalation Daily    And     umeclidinium  1 puff Inhalation Daily     ipratropium - albuterol 0.5 mg/2.5 mg/3 mL  3 mL Nebulization 4x daily     levothyroxine  137 mcg Oral Daily     lidocaine  1 patch Transdermal Q24H     lidocaine   Transdermal Q8H [facility]     [Held by provider] metoprolol succinate ER  50 mg Oral Daily     piperacillin-tazobactam  4.5 g Intravenous Q6H     rosuvastatin  10 mg Oral Daily     tamsulosin  0.4 mg Oral At Bedtime     vancomycin  1,000 mg Intravenous Q24H     PRN Meds: albuterol, bisacodyl, artificial tears,  glucose **OR** dextrose **OR** glucagon, HOLD MEDICATION, LORazepam, - MEDICATION INSTRUCTIONS -, ondansetron **OR** ondansetron, polyethylene glycol, prochlorperazine **OR** prochlorperazine **OR** prochlorperazine, senna-docusate **OR** senna-docusate         Data:      All new lab and imaging data was reviewed.   Recent Labs   Lab Test 01/16/23  0543 01/15/23  0322 01/14/23  0632 01/13/23  0627 03/16/22  1016 02/11/22  0749 01/21/22  0629 09/01/21  0953 07/02/21  0712   WBC 11.7* 7.2  --  8.4   < >  --  10.2   < > 6.3   HGB 10.9* 14.3  --  11.8*   < >  --  11.8*   < > 13.2*   MCV 88 95  --  88   < >  --  92   < > 91   PLT 94* 141* 139* 118*   < > 191 227   < > 179   INR  --   --   --   --   --  1.03 1.18*  --  0.99    < > = values in this interval not displayed.      Recent Labs   Lab Test 01/16/23  0543 01/16/23  0021 01/15/23  1212 01/15/23  0719 01/15/23  0322 01/14/23  0632 01/13/23 0627     --   --   --  142  --  142   POTASSIUM 3.6  --   --   --  4.3 3.7 3.6   CHLORIDE 110*  --   --   --  105  --  104   CO2 22  --   --   --  18*  --  27   BUN 16  --   --   --  14  --  10   CR 1.01  --   --   --  1.22 0.73 0.73   ANIONGAP 10  --   --   --  19*  --  11   KARLIE 7.1*  --   --   --  8.9  --  9.0   * 113* 123*   < > 66*  --  75    < > = values in this interval not displayed.     Recent Labs   Lab Test 02/24/19 2119   TROPI <0.015
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
## 2023-01-16 NOTE — PLAN OF CARE
Periods of lethargy, otherwise is alert and oriented and cooperative with staff. When awake is on RA. BIPAP during the night. Problems with urinary retention yesterday and throughout the night. 1st BS approx 400mL. Patient denies discomfort.  Dr. Carter suggested waiting to straight cath until patient felt the need to void and/or BS showed 500mL. 2nd BS greater than 600mL. 3 RN's attempted to straight cath and each time resistance was met. Patient mentioned previous history of prostate surgery? Flomax ordered and given. Possible need for urology consult. Patient is currently sleeping and is in no distress.

## 2023-01-16 NOTE — PLAN OF CARE
Goal Outcome Evaluation:  Cognitive Concerns/ Orientation : A&Ox4, forgetful  BEHAVIOR & AGGRESSION TOOL COLOR: NA  CIWA SCORE:NA  ABNL VS/O2: 1 L NC, VSS,   MOBILITY: assist of 1 GBW, up in the recliner, ambulated in the room x2  PAIN MANAGMENT: denies  DIET: regular  BOWEL/BLADDER: difficulty voiding will attempt to place a martinez per Urology  ABNL LAB/BG:  DRAIN/DEVICES: PIV  TELEMETRY RHYTHM: NA  SKIN: fragile, bruised, skin ear on right wrist  TESTS/PROCEDURES: none  D/C DAY/GOALS/PLACE: home 1-2 days pending progress  OTHER IMPORTANT INFO: diminished lung sounds, no cough, ACEVES. Rib tenderness but not need for interventions per pt    Voided in the toilet, post residual void was 711, Candie from Urology contacted, order received to place d 18 [place] coude martinez with 2 urojets.

## 2023-01-16 NOTE — PHARMACY-VANCOMYCIN DOSING SERVICE
Vancomycin Dosing    Will empirically change Vancomycin dose to 1500 mg q24h to achieve -600.    Regimen: 1500 mg IV every 24 hours.  Start time: 04:18 on 01/17/2023  Exposure target: AUC24 (range)400-600 mg/L.hr   AUC24,ss: 518 mg/L.hr  Probability of AUC24 > 400: 77 %  Ctrough,ss: 14.8 mg/L  Probability of Ctrough,ss > 20: 26 %  Probability of nephrotoxicity (Lodise GARCÍA 2009): 10 %      Will recheck level 3-4 dose.     Trey Hdz, [facility]  January 16, 2023

## 2023-01-17 ENCOUNTER — APPOINTMENT (OUTPATIENT)
Dept: PHYSICAL THERAPY | Facility: CLINIC | Age: 78
DRG: 871 | End: 2023-01-17
Attending: HOSPITALIST
Payer: MEDICARE

## 2023-01-17 ENCOUNTER — APPOINTMENT (OUTPATIENT)
Dept: OCCUPATIONAL THERAPY | Facility: CLINIC | Age: 78
DRG: 871 | End: 2023-01-17
Payer: MEDICARE

## 2023-01-17 LAB
ANION GAP SERPL CALCULATED.3IONS-SCNC: 9 MMOL/L (ref 3–14)
BASOPHILS # BLD MANUAL: 0 10E3/UL (ref 0–0.2)
BASOPHILS NFR BLD MANUAL: 0 %
BUN SERPL-MCNC: 11 MG/DL (ref 7–30)
C DIFF TOX B STL QL: NEGATIVE
CALCIUM SERPL-MCNC: 7.6 MG/DL (ref 8.5–10.1)
CHLORIDE BLD-SCNC: 112 MMOL/L (ref 94–109)
CO2 SERPL-SCNC: 23 MMOL/L (ref 20–32)
CREAT SERPL-MCNC: 0.75 MG/DL (ref 0.66–1.25)
EOSINOPHIL # BLD MANUAL: 6.4 10E3/UL (ref 0–0.7)
EOSINOPHIL NFR BLD MANUAL: 45 %
ERYTHROCYTE [DISTWIDTH] IN BLOOD BY AUTOMATED COUNT: 17.4 % (ref 10–15)
GFR SERPL CREATININE-BSD FRML MDRD: >90 ML/MIN/1.73M2
GLUCOSE BLD-MCNC: 70 MG/DL (ref 70–99)
GLUCOSE BLDC GLUCOMTR-MCNC: 75 MG/DL (ref 70–99)
HCT VFR BLD AUTO: 31.7 % (ref 40–53)
HGB BLD-MCNC: 10.6 G/DL (ref 13.3–17.7)
LYMPHOCYTES # BLD MANUAL: 0.3 10E3/UL (ref 0.8–5.3)
LYMPHOCYTES NFR BLD MANUAL: 2 %
MCH RBC QN AUTO: 29.7 PG (ref 26.5–33)
MCHC RBC AUTO-ENTMCNC: 33.4 G/DL (ref 31.5–36.5)
MCV RBC AUTO: 89 FL (ref 78–100)
MONOCYTES # BLD MANUAL: 0 10E3/UL (ref 0–1.3)
MONOCYTES NFR BLD MANUAL: 0 %
MYELOCYTES # BLD MANUAL: 0.1 10E3/UL
MYELOCYTES NFR BLD MANUAL: 1 %
NEUTROPHILS # BLD MANUAL: 7.4 10E3/UL (ref 1.6–8.3)
NEUTROPHILS NFR BLD MANUAL: 52 %
PLAT MORPH BLD: ABNORMAL
PLATELET # BLD AUTO: 102 10E3/UL (ref 150–450)
POTASSIUM BLD-SCNC: 3.3 MMOL/L (ref 3.4–5.3)
POTASSIUM SERPL-SCNC: 4 MMOL/L (ref 3.4–5.3)
RBC # BLD AUTO: 3.57 10E6/UL (ref 4.4–5.9)
RBC MORPH BLD: ABNORMAL
SODIUM SERPL-SCNC: 144 MMOL/L (ref 133–144)
WBC # BLD AUTO: 14.3 10E3/UL (ref 4–11)

## 2023-01-17 PROCEDURE — 87493 C DIFF AMPLIFIED PROBE: CPT | Performed by: HOSPITALIST

## 2023-01-17 PROCEDURE — 85014 HEMATOCRIT: CPT | Performed by: HOSPITALIST

## 2023-01-17 PROCEDURE — 250N000013 HC RX MED GY IP 250 OP 250 PS 637: Performed by: HOSPITALIST

## 2023-01-17 PROCEDURE — 99233 SBSQ HOSP IP/OBS HIGH 50: CPT | Performed by: HOSPITALIST

## 2023-01-17 PROCEDURE — 250N000011 HC RX IP 250 OP 636: Performed by: STUDENT IN AN ORGANIZED HEALTH CARE EDUCATION/TRAINING PROGRAM

## 2023-01-17 PROCEDURE — 97116 GAIT TRAINING THERAPY: CPT | Mod: GP | Performed by: PHYSICAL THERAPIST

## 2023-01-17 PROCEDURE — 36415 COLL VENOUS BLD VENIPUNCTURE: CPT | Performed by: HOSPITALIST

## 2023-01-17 PROCEDURE — 258N000003 HC RX IP 258 OP 636: Performed by: STUDENT IN AN ORGANIZED HEALTH CARE EDUCATION/TRAINING PROGRAM

## 2023-01-17 PROCEDURE — 999N000157 HC STATISTIC RCP TIME EA 10 MIN

## 2023-01-17 PROCEDURE — 80048 BASIC METABOLIC PNL TOTAL CA: CPT | Performed by: HOSPITALIST

## 2023-01-17 PROCEDURE — 120N000001 HC R&B MED SURG/OB

## 2023-01-17 PROCEDURE — 85007 BL SMEAR W/DIFF WBC COUNT: CPT | Performed by: HOSPITALIST

## 2023-01-17 PROCEDURE — 97535 SELF CARE MNGMENT TRAINING: CPT | Mod: GO | Performed by: OCCUPATIONAL THERAPIST

## 2023-01-17 PROCEDURE — 84132 ASSAY OF SERUM POTASSIUM: CPT | Performed by: HOSPITALIST

## 2023-01-17 PROCEDURE — 97161 PT EVAL LOW COMPLEX 20 MIN: CPT | Mod: GP | Performed by: PHYSICAL THERAPIST

## 2023-01-17 PROCEDURE — 94660 CPAP INITIATION&MGMT: CPT

## 2023-01-17 PROCEDURE — 97530 THERAPEUTIC ACTIVITIES: CPT | Mod: GP | Performed by: PHYSICAL THERAPIST

## 2023-01-17 PROCEDURE — 250N000011 HC RX IP 250 OP 636: Performed by: HOSPITALIST

## 2023-01-17 RX ORDER — POTASSIUM CHLORIDE 1500 MG/1
40 TABLET, EXTENDED RELEASE ORAL ONCE
Status: COMPLETED | OUTPATIENT
Start: 2023-01-17 | End: 2023-01-17

## 2023-01-17 RX ADMIN — ASPIRIN 81 MG: 81 TABLET, COATED ORAL at 09:20

## 2023-01-17 RX ADMIN — PIPERACILLIN AND TAZOBACTAM 4.5 G: 4; .5 INJECTION, POWDER, FOR SOLUTION INTRAVENOUS at 16:30

## 2023-01-17 RX ADMIN — DULOXETINE HYDROCHLORIDE 20 MG: 20 CAPSULE, DELAYED RELEASE ORAL at 09:21

## 2023-01-17 RX ADMIN — PIPERACILLIN AND TAZOBACTAM 4.5 G: 4; .5 INJECTION, POWDER, FOR SOLUTION INTRAVENOUS at 11:02

## 2023-01-17 RX ADMIN — METOPROLOL SUCCINATE 50 MG: 50 TABLET, EXTENDED RELEASE ORAL at 09:20

## 2023-01-17 RX ADMIN — TAMSULOSIN HYDROCHLORIDE 0.4 MG: 0.4 CAPSULE ORAL at 21:43

## 2023-01-17 RX ADMIN — VANCOMYCIN HYDROCHLORIDE 1500 MG: 10 INJECTION, POWDER, LYOPHILIZED, FOR SOLUTION INTRAVENOUS at 04:03

## 2023-01-17 RX ADMIN — DULOXETINE HYDROCHLORIDE 20 MG: 20 CAPSULE, DELAYED RELEASE ORAL at 21:43

## 2023-01-17 RX ADMIN — ROSUVASTATIN CALCIUM 10 MG: 10 TABLET, FILM COATED ORAL at 09:20

## 2023-01-17 RX ADMIN — PIPERACILLIN AND TAZOBACTAM 4.5 G: 4; .5 INJECTION, POWDER, FOR SOLUTION INTRAVENOUS at 03:19

## 2023-01-17 RX ADMIN — LEVOTHYROXINE SODIUM 137 MCG: 112 TABLET ORAL at 06:45

## 2023-01-17 RX ADMIN — POTASSIUM CHLORIDE 40 MEQ: 1500 TABLET, EXTENDED RELEASE ORAL at 09:20

## 2023-01-17 RX ADMIN — PIPERACILLIN AND TAZOBACTAM 4.5 G: 4; .5 INJECTION, POWDER, FOR SOLUTION INTRAVENOUS at 21:44

## 2023-01-17 ASSESSMENT — ACTIVITIES OF DAILY LIVING (ADL)
ADLS_ACUITY_SCORE: 30
ADLS_ACUITY_SCORE: 36
ADLS_ACUITY_SCORE: 25
ADLS_ACUITY_SCORE: 30
ADLS_ACUITY_SCORE: 30
ADLS_ACUITY_SCORE: 25
ADLS_ACUITY_SCORE: 24
ADLS_ACUITY_SCORE: 28
ADLS_ACUITY_SCORE: 30
ADLS_ACUITY_SCORE: 36
ADLS_ACUITY_SCORE: 25
ADLS_ACUITY_SCORE: 28

## 2023-01-17 NOTE — PLAN OF CARE
"Summary: He was brought back to ED on 1/15/23 with altered mental status and a fall. He was noted to be significantly hypoxic, obtunded and hypotensive to SBP 80s  DATE & TIME: 1/17/23 7-3 pm    Cognitive Concerns/ Orientation : A & O x4, forgetful  BEHAVIOR & AGGRESSION TOOL   ABNL VS/O2: VSS on 1L nasal cannula.   MOBILITY: SBA/A1 w/ walker and gait belt. Weak/ACEVES.   PAIN MANAGMENT: C/O of 2/10 L rib pain. Refuses intervention.  DIET: Regular, poor appetite.   BOWEL/BLADDER: Mir in place, with pink/red tinged urine, MD aware. Significant diarrhea x 9-10- frequency and urgency with BM.  ABNL LAB/BG: K+ 3.3, replaced orally recheck this afternoon. Chloride 112, Calcium 7.6, WBC 14.3, Hgb 10.6. BGMs 70/75  DRAIN/DEVICES: 2 PIV SL  TELEMETRY RHYTHM: NA  SKIN: Dusky/fragile, scattered bruise, blotchy legs. Coccyx pink blanchable.    TESTS/PROCEDURES: None scheduled.   D/C DAY/GOALS/PLACE: Per MD note \"in 1-2 days pending clinical stability. SW following for disposition to TCU\".    OTHER IMPORTANT INFO: Pt having severe diarrhea all shift. Enteric precautions in place. Sent stool down, Puma PCR negative awaiting antigen result. ACEVES. Impulsive exits the bed frequently.   "

## 2023-01-17 NOTE — PROGRESS NOTES
01/17/23 1443   Appointment Info   Signing Clinician's Name / Credentials (PT) Yuli To, PT   Living Environment   People in Home spouse   Current Living Arrangements house   Home Accessibility stairs to enter home   Number of Stairs, Main Entrance 1   Stair Railings, Main Entrance none   Transportation Anticipated family or friend will provide   Living Environment Comments Patient reports he was not using a walker at home until recent hospitalization. He was only home for about 15 hours after recent admission.   Self-Care   Usual Activity Tolerance fair   Current Activity Tolerance poor   Equipment Currently Used at Home walker, rolling   Fall history within last six months yes   Number of times patient has fallen within last six months 2   General Information   Onset of Illness/Injury or Date of Surgery 01/15/23   Referring Physician Golden Logan   Patient/Family Therapy Goals Statement (PT) Agreeable to TCU after therapist recommended it.   Pertinent History of Current Problem (include personal factors and/or comorbidities that impact the POC) Per chart, Harrison Thomas is a 77 year old male with a complex PMH including thyroid CA (s/p thyroidectomy), severe COPD, CAD, HTN, DLD, RA, BRENNEN, RUL lung mass (probable malignancy) who was recently admitted from 1/9 to 1/14/23 following a  mechanical  fall with left 8th rib fracture and evaluated for shortness of breath . He was evaluated by pulmonology, treated for likely community acquired pneumonia .  He was discharged on 1/14/23. He was brought back to ED on 1/15/23 with altered mental status and a fall. He was noted to be significantly hypoxic, obtunded and hypotensive to SBP 80s.   Existing Precautions/Restrictions fall;oxygen therapy device and L/min  (On one L.)   General Observations Agreeable to PT but does not want to amb in hallway due to frequent diahrrea. On one L oxygen.   Cognition   Affect/Mental Status (Cognition) WFL   Orientation Status  (Cognition) oriented x 4   Follows Commands (Cognition) WFL   Cognitive Status Comments Able to follow commands consistently.   Pain Assessment   Patient Currently in Pain No   Integumentary/Edema   Integumentary/Edema no deficits were identifed   Posture    Posture Forward head position;Protracted shoulders   Range of Motion (ROM)   Range of Motion ROM is WFL   Strength (Manual Muscle Testing)   Strength Comments No specific deficits noted by generalized weakness due to illness and SOB.   Bed Mobility   Comment, (Bed Mobility) Sup to/from sit I from flat bed without a rail.   Transfers   Comment, (Transfers) Sit to/from stand with supervision with use of ww. No cues needed.   Gait/Stairs (Locomotion)   Comment, (Gait/Stairs) Patient amb with 4ww 5 feet in room on 1L with CGA.   Balance   Balance Comments Good sitting balance. Good static standing balance without AD. Needs bilateral UE support for dynamic standing balance.   Sensory Examination   Sensory Perception patient reports no sensory changes   Coordination   Coordination no deficits were identified   Clinical Impression   Criteria for Skilled Therapeutic Intervention Yes, treatment indicated   PT Diagnosis (PT) Impaired functional independence and endurance   Influenced by the following impairments generalized weakness, quick fatigue with amb, decreased oxygen saturation with amb.   Functional limitations due to impairments Limited functional endurance and independence   Clinical Presentation (PT Evaluation Complexity) Stable/Uncomplicated   Clinical Presentation Rationale <3 factors affecting mobiltiy   Clinical Decision Making (Complexity) low complexity   Planned Therapy Interventions (PT) balance training;gait training;strengthening;progressive activity/exercise   Anticipated Equipment Needs at Discharge (PT)   (Has a ww already.)   Risk & Benefits of therapy have been explained evaluation/treatment results reviewed;care plan/treatment goals  reviewed;participants voiced agreement with care plan;risks/benefits reviewed;patient   PT Total Evaluation Time   PT Eval, Low Complexity Minutes (89733) 13   Physical Therapy Goals   PT Frequency Daily   PT Predicted Duration/Target Date for Goal Attainment 01/20/23   PT Goals PT Goal 1   PT: Transfers Modified independent;Bed to/from chair;Sit to/from stand   PT: Gait Modified independent;Rolling walker;Greater than 200 feet  (Maintaining O2 sat above 90% on room air.)   PT: Stairs Supervision/stand-by assist;1 stair   PT: Goal 1 Patient will tolerate at least 8 min of continuous activity maintaining O2 sat above 90%.   Interventions   Interventions Quick Adds Gait Training;Therapeutic Activity   Therapeutic Activity   Therapeutic Activities: dynamic activities to improve functional performance Minutes (14973) 8   Symptoms Noted During/After Treatment Fatigue;Shortness of breath   Treatment Detail/Skilled Intervention Educated patient on deep breathing with all activity. Performed sit to/from stand times 5 with cues to eccentrically control return to sitting. Tolerated marching in place for 1 min times 2 maintain O2 sat above 90% with cues for deep breathing.   Gait Training   Gait Training Minutes (09430) 14   Symptoms Noted During/After Treatment (Gait Training) fatigue;shortness of breath;significant change in vital signs  (On 1 L and O2 sat down to 83%)   Treatment Detail/Skilled Intervention In addition to eval, patient amb in circles in room for a total of 70 feet times 2. O2 sat 83% after each amb and took 1 min to increase back to 90%.   [place] Level (Gait Training) contact guard   Physical Assistance Level (Gait Training) verbal cues   Assistive Device (Gait Training) rolling walker   Pattern Analysis (Gait Training) swing-through gait   Impairments (Gait Analysis/Training) balance impaired;other (see comments)  (Generalized weakness and decreased endurance.)   PT Discharge Planning   PT Plan gait  w/ FWW; monitor O2; dynamic balance   PT Discharge Recommendation (DC Rec) Transitional Care Facility   PT Rationale for DC Rec Patient currently well below baseline with activity tolerance and balance decreased from prior to initial hospitalization. Patient wasn't home for even 24 hours after recent hospitalization. Recommend TCU to progress his activity tolerance and functional endurance. Currently desaturating to 83% with amb.   PT Brief overview of current status Up with CGA and us of ww.   Total Session Time   Timed Code Treatment Minutes 22   Total Session Time (sum of timed and untimed services) 35

## 2023-01-17 NOTE — PROGRESS NOTES
[facility]  Hospitalist Progress Note        Josiah Rodriguez MD   01/17/2023        Interval History:        - Transferred out of ICU 1/16/23  - ECHO 1/16 UR with LVEF 65-70%, no regional wall motion abnormalities noted  - Mir catheter was placed (1/16/23) for urinary retention; evaluated by urology-- suggested to continue Flomax 0.4 mg daily and to have voiding trial in 1 week; to follow up with Urology  - having loose stools X 3 in 24 hrs; given presentation with likely septic shock, worsening leukocytosis, will check stool for C diff (ordered 1/17/23)  - K 3.3-- will start potassium supplementation protocol         Assessment and Plan:        Harrison Thomas is a 77 year old male with a complex PMH including thyroid CA (s/p thyroidectomy), severe COPD, CAD, HTN, DLD, RA, BRENNEN, RUL lung mass (probable malignancy) who was recently admitted from 1/9 to 1/14/23 following a  mechanical  fall with left 8th rib fracture and evaluated for shortness of breath . He was evaluated by pulmonology, treated for likely community acquired pneumonia .  He was discharged on 1/14/23 with PO Ceftin and pain control for rib fracture  with prn oxycodone , flexeril and lidocaine patch.     He was brought back to ED on 1/15/23 with altered mental status and a fall. He was noted to be significantly hypoxic, obtunded and hypotensive to SBP 80s. On admission noted worsened cr 1.22; lactic acid 7.6; BNP 2374, pro calcitonin 1.43; normal troponin, Blood glucose 66.     He follows up with Dr. Dumont and Pulmonology for probable lung cancer with possible radiation pneumonitis. He has been noted with RUL mass like lesion (PET positive). He underwent VATS biopsy (02/2022) by Dr Dumont but was noted to be incomplete due to a lot of scar tissue. He subsequently underwent empiric radiation therapy for enlarging right upper lobe density.     He was also recently hospitalized in 10/2022 following a fall, noted with  pneumonia and/or radiation pneumonitis. Received a prolonged steroid taper at that time. In early December, 2022 he was treated empirically with Ceftin for 7 days (for CT showing scattered micro nodules with concern for possible infection). Several workup for Histo, Blasto, Legionella, Strep and Aspergillus were negative. Last seen in pulmonology clinic on 12/30/2022 at which time he had completed the prolonged steroid taper and had been off Oxygen since 12/18/22.    Probable septic shock  likely healthcare associated pneumonia  acute on chronic hypoxic respiratory failure  lactic acidosis  metabolic encephalopathy, unclear etiology, likely opioid induced  recent admission for Closed fracture of L 8th rib s/p mechanical fall   Severe COPD/ emphysema  -  Has significant pulmonary history including severe COPD, right upper lobe density/mass, h/o radiation pneumonitis and recent hospitalizations as noted (as summarized above, also discussed below)  - as noted above, was hospitalized from 1/9 to 1/14/23 following a  mechanical  fall with left 8th rib fracture and SOB; was evaluated by pulmonology, and given recent worsening in the RUL, suspected likely infectious process; was treated for likely community acquired pneumonia with 4 days of Rocephin and was d/zabrina on PO Ceftin and pain control for rib fracture  with prn oxycodone , flexeril and lidocaine patch  - brought back on 1/15 with AMS and a fall; was noted significantly hypoxic, obtunded and hypotensive to SBP 80s. On admission noted worsened cr 1.22; lactic acid 7.6--> 4.2; BNP 2374, pro calcitonin 1.43; normal troponin, Blood glucose 66    - Unclear for his acute decompensation following discharge, but also he has significantly improved including mentation with supportive care and Narcan drip, thus some concern for likely unintentional opioid overdose  -  on admission CT head showed no acute intracranial process  - repeat CT chest 1/15 showed no PE and grossly  unchanged masslike consolidation in the inferior right upper lobe and nodular consolidation in the right lower lobe, unchanged indexed pulmonary nodules  - started on empiric vancomycin and Zosyn for likely healthcare associated pneumonia with sepsis  - was started on BiPAP support;  hypotension responded to IV fluids/boluses    - weaned off of BiPAP to 2 L nasal cannula oxygen  - off Narcan drip, mentation has improved and stable  - no further episodes of hypotension; elevated BNP, worsened from last hospitalization; ECHO 1/16 UR with LVEF 65-70%, no regional wall motion abnormalities noted; will continue to hold PTA Lasix   - remains afebrile, lactate normalized- 7.6- ---> 1.6; WBC slightly up 11.7---14.3; blood cultures with no growth so far; will discontinue vancomycin (1/17); continue Zosyn  - continue lidocaine patch for pain control from rib fracture  - encourage incentive spirometry; wean oxygen as able; might need some home oxygen  - continue nebulizers Q4 PRN ; continue Trelegy Ellipta (patient to use his own supply from home, d/w Pharmacy)     Diarrhea  mild hypokalemia  - having loose stools X 3 in 24 hrs; has been afebrile and no abdominal discomfort but given presentation with likely septic shock, worsening leukocytosis, will check stool for C diff (ordered 1/17/23)  - K 3.3-- will start potassium supplementation protocol    Abnormal CT chest with RUL density/mass dating back prior to 2/2022, h/o empiric radiation therapy  obstructive sleep apnea-- not using CPAP  H/o radiation pneumonitis  - follows at [facility] (last seen in clinic on 12/30/22) for his combination of severe COPD with emphysema and abnormal chest imaging with incomplete at attempted VATS lung biopsy and radiation for possible thyroid or lung cancer with possible radiation pneumonitis.  - was due to see Dr. Dumont 1/10 but unfortunately got admitted  - CT chest this  admission as noted above  - evaluated by pulmonology during last hospitalization (signed off 1/12/23), suggested to continue antibiotics for likely CAP, repeat CT chest without contrast in 6 weeks    Urinary retention  likely acute renal failure  - renal function improved, creatinine1.22-----> 1.01;  - this admission noted difficulty voiding, needed intermittent straight cath with difficulty  - Mir catheter was placed (1/16/23) ; evaluated by urology-- suggested to continue Flomax 0.4 mg daily (started 1/16) and to have voiding trial in 1 week; to follow up with Urology     CAD with hx Cx stenting 1997  HTN  HLD  [amlodipine 5 mg daily, aspirin 81 mg daily, furosemide 20 mg daily, metoprolol XL 50 mg day, rosuvastatin 10 mg daily]  Plan:  - Continue PTA meds  - holding PTA Lasix for now given the need for fluid boluses due to hypertension on presentation; ECHO 1/16 UR with LVEF 65-70%, no regional wall motion abnormalities noted  - BP stable; resumed PTA Toprol-XL (1/17/23) with hold parameters     Metastatic papillary thyroid cancer  Hypothyroidism  S/p total thyroidectomy 8/2021 with 4/4 LN positive. Treated with radioactive iodine ablation 9/2021.   -  continue  levothyroxine 137 mcg daily        BRENNEN (obstructive sleep apnea)  - does not use CPAP; continue to follow with pulmonology       Rheumatoid arthritis (H)   Follows with rheumatology. Not currently on medications for this    Diet: Regular Diet Adult      DVT Prophylaxis: PCD boots    Code Status: No CPR- Do NOT Intubate      Clinically Significant Risk Factors        # Hypokalemia: Lowest K = 3.3 mmol/L in last 2 days, will replace as needed       # Hypoalbuminemia: Lowest albumin = 2.3 g/dL at 1/15/2023  3:22 AM, will monitor as appropriate   # Thrombocytopenia: Lowest platelets = 94 in last 2 days, will monitor for bleeding          # Overweight: Estimated body mass index is 28.19 kg/m  as calculated from the following:    Height as of 1/9/23: 1.626 m  "(5' 4\").    Weight as of this encounter: 74.5 kg (164 lb 3.9 oz)., PRESENT ON ADMISSION          Disposition:   - in 1-2 days pending clinical stability  - SW following for disposition to TCU   - patient/family inquiring about home BiPAP ; he needed BiPAP on admission but then has been stable on couple liters of nasal canula oxygen; I don't see a need for home BiPAP at this time; will need to resume home Oxygen    Page Me (7 am to 6 pm)    Care plan discussed with patient and nursing    >50 minutes spent today; high complexity              Physical Exam:      Blood pressure (!) 141/75, pulse 75, temperature 97.9  F (36.6  C), temperature source Oral, resp. rate 16, weight 74.5 kg (164 lb 3.9 oz), SpO2 96 %.  Vitals:    01/15/23 0711 01/16/23 0200 01/17/23 0710   Weight: 74.9 kg (165 lb 2 oz) 74.6 kg (164 lb 7.4 oz) 74.5 kg (164 lb 3.9 oz)     Vital Signs with Ranges  Temp:  [97.4  F (36.3  C)-98  F (36.7  C)] 97.9  F (36.6  C)  Pulse:  [] 75  Resp:  [16-18] 16  BP: (110-155)/(62-86) 141/75  SpO2:  [93 %-99 %] 96 %  I/O's Last 24 hours  I/O last 3 completed shifts:  In: 390 [P.O.:290; I.V.:100]  Out: 1190 [Urine:1190]    Constitutional: Alert, awake and oriented ; resting comfortably in no apparent distress   HEENT: Pupils equal and reactive to light and accomodation, neck supple    Oral cavity: Moist mucosa   Cardiovascular: Normal s1 s2, regular rate and rhythm, no murmur   Lungs: Mild bibasal crepts +;  no wheezes   Abdomen: Soft, nt, nd, no guarding, rigidity or rebound; BS +   LE : No edema   Musculoskeletal: Power 5/5 in all extremities   Neuro: No focal neurological deficits noted   Psychiatry: normal mood and affect                Medications:          acetaminophen  975 mg Oral TID     aspirin  81 mg Oral Daily     DULoxetine  20 mg Oral BID     Fluticasone-Umeclidin-Vilant  1 puff Inhalation Daily     levothyroxine  137 mcg Oral Daily     lidocaine  1 patch Transdermal Q24H     lidocaine   Transdermal " Q8H [facility]     metoprolol succinate ER  50 mg Oral Daily     piperacillin-tazobactam  4.5 g Intravenous Q6H     rosuvastatin  10 mg Oral Daily     tamsulosin  0.4 mg Oral At Bedtime     vancomycin  1,500 mg Intravenous Q24H     PRN Meds: albuterol, bisacodyl, artificial tears, glucose **OR** dextrose **OR** glucagon, ipratropium - albuterol 0.5 mg/2.5 mg/3 mL, LORazepam, - MEDICATION INSTRUCTIONS -, ondansetron **OR** ondansetron, polyethylene glycol, prochlorperazine **OR** prochlorperazine **OR** prochlorperazine, senna-docusate **OR** senna-docusate         Data:      All new lab and imaging data was reviewed.   Recent Labs   Lab Test 01/17/23  0726 01/16/23  0543 01/15/23  0322 03/16/22  1016 02/11/22  0749 01/21/22  0629 09/01/21  0953 07/02/21  0712   WBC 14.3* 11.7* 7.2   < >  --  10.2   < > 6.3   HGB 10.6* 10.9* 14.3   < >  --  11.8*   < > 13.2*   MCV 89 88 95   < >  --  92   < > 91   * 94* 141*   < > 191 227   < > 179   INR  --   --   --   --  1.03 1.18*  --  0.99    < > = values in this interval not displayed.      Recent Labs   Lab Test 01/17/23  0726 01/16/23  0832 01/16/23  0543 01/15/23  0719 01/15/23  0322     --  142  --  142   POTASSIUM 3.3*  --  3.6  --  4.3   CHLORIDE 112*  --  110*  --  105   CO2 23  --  22  --  18*   BUN 11  --  16  --  14   CR 0.75  --  1.01  --  1.22   ANIONGAP 9  --  10  --  19*   KARLIE 7.6*  --  7.1*  --  8.9   GLC 70 141* 115*   < > 66*    < > = values in this interval not displayed.     Recent Labs   Lab Test 02/24/19 2119   TROPI <0.015

## 2023-01-17 NOTE — PLAN OF CARE
Goal Outcome Evaluation:                      Summary:  hypotension, unintentional narc overdose    1/16/2023 [id number]    Orientation Alt & O x4     Vitals/Tele VSS on 1 Lo2 Bipap on for bedtime    IV Access/drains R& L PIV SL     Diet Reg good appetite     Mobility Ast 1 GB/W refusing gate belt     GI/ Mir / multiple loose stools up to bedside commode due to urgency and frequency     Wound/Skin scattered bruising and blanchable sacrum no adjustments needed      Consults Home O2 consult , Urology     Discharge Plan Home       See Flow sheets for assessment

## 2023-01-17 NOTE — PLAN OF CARE
Goal Outcome Evaluation:       Summary:  hypotension, unintentional narc overdose  DATE & TIME: 1/17/23 0357-0365    Cognitive Concerns/ Orientation : A&Ox4, forgetful  BEHAVIOR & AGGRESSION TOOL COLOR: NA  CIWA SCORE:NA  ABNL VS/O2: 1 L NC, VSS, BiPAP at night   MOBILITY: Ax1 W- refusing gaitbelt, ambulated to bedside commode x3  PAIN MANAGMENT: denies  DIET: regular  BOWEL/BLADDER: Mir in place, diarrhea x3- frequency and urgency with BM.  ABNL LAB/BG: Ca 7.1, WBC 11.7, Hgb 10.9  DRAIN/DEVICES: L & R PIV SL  TELEMETRY RHYTHM: NA  SKIN: fragile, scattered bruise, blotchy legs   TESTS/PROCEDURES: none  D/C DAY/GOALS/PLACE: pending progress  OTHER IMPORTANT INFO: diminished lung sounds, no cough, ACEVES.

## 2023-01-17 NOTE — PROGRESS NOTES
Care Coordination SW note  Received a message from wife regarding setting up a meeting with herself and patient to discuss discharge planning to a TCU.  She said her message that patient has been declining TCU for 1&1/2 years due to a previous bad experience.  She would like patient to consider TCU at [facility].    Note OT evaluation recommended home based on patient preference.  Writer will clarify if OT clinically recommends home or TCU.  Will speak with patient and contact wife.

## 2023-01-18 ENCOUNTER — APPOINTMENT (OUTPATIENT)
Dept: OCCUPATIONAL THERAPY | Facility: CLINIC | Age: 78
DRG: 871 | End: 2023-01-18
Payer: MEDICARE

## 2023-01-18 LAB
ANION GAP SERPL CALCULATED.3IONS-SCNC: 14 MMOL/L (ref 7–15)
BASOPHILS # BLD MANUAL: 0 10E3/UL (ref 0–0.2)
BASOPHILS NFR BLD MANUAL: 0 %
BUN SERPL-MCNC: 6.1 MG/DL (ref 8–23)
CALCIUM SERPL-MCNC: 7.9 MG/DL (ref 8.8–10.2)
CHLORIDE SERPL-SCNC: 104 MMOL/L (ref 98–107)
CREAT SERPL-MCNC: 0.69 MG/DL (ref 0.67–1.17)
DEPRECATED HCO3 PLAS-SCNC: 21 MMOL/L (ref 22–29)
EOSINOPHIL # BLD MANUAL: 7.5 10E3/UL (ref 0–0.7)
EOSINOPHIL NFR BLD MANUAL: 48 %
ERYTHROCYTE [DISTWIDTH] IN BLOOD BY AUTOMATED COUNT: 17.2 % (ref 10–15)
GFR SERPL CREATININE-BSD FRML MDRD: >90 ML/MIN/1.73M2
GLUCOSE BLDC GLUCOMTR-MCNC: 61 MG/DL (ref 70–99)
GLUCOSE BLDC GLUCOMTR-MCNC: 76 MG/DL (ref 70–99)
GLUCOSE BLDC GLUCOMTR-MCNC: 88 MG/DL (ref 70–99)
GLUCOSE SERPL-MCNC: 56 MG/DL (ref 70–99)
HCT VFR BLD AUTO: 33.8 % (ref 40–53)
HGB BLD-MCNC: 11.3 G/DL (ref 13.3–17.7)
LYMPHOCYTES # BLD MANUAL: 0.6 10E3/UL (ref 0.8–5.3)
LYMPHOCYTES NFR BLD MANUAL: 4 %
MCH RBC QN AUTO: 29.5 PG (ref 26.5–33)
MCHC RBC AUTO-ENTMCNC: 33.4 G/DL (ref 31.5–36.5)
MCV RBC AUTO: 88 FL (ref 78–100)
METAMYELOCYTES # BLD MANUAL: 0.2 10E3/UL
METAMYELOCYTES NFR BLD MANUAL: 1 %
MONOCYTES # BLD MANUAL: 0.2 10E3/UL (ref 0–1.3)
MONOCYTES NFR BLD MANUAL: 1 %
NEUTROPHILS # BLD MANUAL: 7.2 10E3/UL (ref 1.6–8.3)
NEUTROPHILS NFR BLD MANUAL: 46 %
NRBC # BLD AUTO: 0.2 10E3/UL
NRBC BLD MANUAL-RTO: 1 %
PLAT MORPH BLD: ABNORMAL
PLATELET # BLD AUTO: 108 10E3/UL (ref 150–450)
POTASSIUM SERPL-SCNC: 3.7 MMOL/L (ref 3.4–5.3)
RBC # BLD AUTO: 3.83 10E6/UL (ref 4.4–5.9)
RBC MORPH BLD: ABNORMAL
SODIUM SERPL-SCNC: 139 MMOL/L (ref 136–145)
WBC # BLD AUTO: 15.6 10E3/UL (ref 4–11)

## 2023-01-18 PROCEDURE — 250N000009 HC RX 250: Performed by: HOSPITALIST

## 2023-01-18 PROCEDURE — 250N000013 HC RX MED GY IP 250 OP 250 PS 637: Performed by: HOSPITALIST

## 2023-01-18 PROCEDURE — 97535 SELF CARE MNGMENT TRAINING: CPT | Mod: GO | Performed by: OCCUPATIONAL THERAPIST

## 2023-01-18 PROCEDURE — 120N000001 HC R&B MED SURG/OB

## 2023-01-18 PROCEDURE — 250N000013 HC RX MED GY IP 250 OP 250 PS 637: Performed by: STUDENT IN AN ORGANIZED HEALTH CARE EDUCATION/TRAINING PROGRAM

## 2023-01-18 PROCEDURE — 82310 ASSAY OF CALCIUM: CPT | Performed by: HOSPITALIST

## 2023-01-18 PROCEDURE — 36415 COLL VENOUS BLD VENIPUNCTURE: CPT | Performed by: HOSPITALIST

## 2023-01-18 PROCEDURE — 250N000009 HC RX 250: Performed by: STUDENT IN AN ORGANIZED HEALTH CARE EDUCATION/TRAINING PROGRAM

## 2023-01-18 PROCEDURE — 999N000157 HC STATISTIC RCP TIME EA 10 MIN

## 2023-01-18 PROCEDURE — 94640 AIRWAY INHALATION TREATMENT: CPT | Mod: 76

## 2023-01-18 PROCEDURE — 99233 SBSQ HOSP IP/OBS HIGH 50: CPT | Performed by: STUDENT IN AN ORGANIZED HEALTH CARE EDUCATION/TRAINING PROGRAM

## 2023-01-18 PROCEDURE — 85041 AUTOMATED RBC COUNT: CPT | Performed by: HOSPITALIST

## 2023-01-18 PROCEDURE — 85007 BL SMEAR W/DIFF WBC COUNT: CPT | Performed by: HOSPITALIST

## 2023-01-18 PROCEDURE — 250N000011 HC RX IP 250 OP 636: Performed by: HOSPITALIST

## 2023-01-18 PROCEDURE — 97110 THERAPEUTIC EXERCISES: CPT | Mod: GO | Performed by: OCCUPATIONAL THERAPIST

## 2023-01-18 PROCEDURE — 94640 AIRWAY INHALATION TREATMENT: CPT

## 2023-01-18 RX ORDER — LOPERAMIDE HCL 2 MG
2 CAPSULE ORAL 4 TIMES DAILY PRN
Status: DISCONTINUED | OUTPATIENT
Start: 2023-01-18 | End: 2023-01-22 | Stop reason: HOSPADM

## 2023-01-18 RX ORDER — SIMETHICONE 80 MG
80 TABLET,CHEWABLE ORAL EVERY 6 HOURS PRN
Status: DISCONTINUED | OUTPATIENT
Start: 2023-01-18 | End: 2023-01-22 | Stop reason: HOSPADM

## 2023-01-18 RX ORDER — ACETYLCYSTEINE 200 MG/ML
2 SOLUTION ORAL; RESPIRATORY (INHALATION) 3 TIMES DAILY
Status: COMPLETED | OUTPATIENT
Start: 2023-01-18 | End: 2023-01-20

## 2023-01-18 RX ORDER — LACTOBACILLUS RHAMNOSUS GG 10B CELL
1 CAPSULE ORAL 2 TIMES DAILY
Status: DISCONTINUED | OUTPATIENT
Start: 2023-01-18 | End: 2023-01-22 | Stop reason: HOSPADM

## 2023-01-18 RX ADMIN — ALBUTEROL SULFATE 2.5 MG: 2.5 SOLUTION RESPIRATORY (INHALATION) at 20:01

## 2023-01-18 RX ADMIN — PIPERACILLIN AND TAZOBACTAM 4.5 G: 4; .5 INJECTION, POWDER, FOR SOLUTION INTRAVENOUS at 15:53

## 2023-01-18 RX ADMIN — METOPROLOL SUCCINATE 50 MG: 50 TABLET, EXTENDED RELEASE ORAL at 09:47

## 2023-01-18 RX ADMIN — LEVOTHYROXINE SODIUM 137 MCG: 112 TABLET ORAL at 09:47

## 2023-01-18 RX ADMIN — ACETYLCYSTEINE 2 ML: 200 SOLUTION ORAL; RESPIRATORY (INHALATION) at 20:01

## 2023-01-18 RX ADMIN — PIPERACILLIN AND TAZOBACTAM 4.5 G: 4; .5 INJECTION, POWDER, FOR SOLUTION INTRAVENOUS at 09:51

## 2023-01-18 RX ADMIN — DULOXETINE HYDROCHLORIDE 20 MG: 20 CAPSULE, DELAYED RELEASE ORAL at 09:47

## 2023-01-18 RX ADMIN — Medication 1 CAPSULE: at 22:06

## 2023-01-18 RX ADMIN — DULOXETINE HYDROCHLORIDE 20 MG: 20 CAPSULE, DELAYED RELEASE ORAL at 22:06

## 2023-01-18 RX ADMIN — ASPIRIN 81 MG: 81 TABLET, COATED ORAL at 09:47

## 2023-01-18 RX ADMIN — TAMSULOSIN HYDROCHLORIDE 0.4 MG: 0.4 CAPSULE ORAL at 22:06

## 2023-01-18 RX ADMIN — PIPERACILLIN AND TAZOBACTAM 4.5 G: 4; .5 INJECTION, POWDER, FOR SOLUTION INTRAVENOUS at 03:42

## 2023-01-18 RX ADMIN — PIPERACILLIN AND TAZOBACTAM 4.5 G: 4; .5 INJECTION, POWDER, FOR SOLUTION INTRAVENOUS at 22:07

## 2023-01-18 RX ADMIN — SIMETHICONE 80 MG: 80 TABLET, CHEWABLE ORAL at 13:36

## 2023-01-18 RX ADMIN — IPRATROPIUM BROMIDE AND ALBUTEROL SULFATE 3 ML: 2.5; .5 SOLUTION RESPIRATORY (INHALATION) at 15:12

## 2023-01-18 RX ADMIN — LOPERAMIDE HYDROCHLORIDE 2 MG: 2 CAPSULE ORAL at 11:33

## 2023-01-18 RX ADMIN — Medication 1 CAPSULE: at 13:35

## 2023-01-18 RX ADMIN — ROSUVASTATIN CALCIUM 10 MG: 10 TABLET, FILM COATED ORAL at 09:47

## 2023-01-18 RX ADMIN — Medication 3 CAPSULE: at 22:06

## 2023-01-18 RX ADMIN — Medication 3 CAPSULE: at 15:51

## 2023-01-18 RX ADMIN — ACETYLCYSTEINE 2 ML: 200 SOLUTION ORAL; RESPIRATORY (INHALATION) at 15:12

## 2023-01-18 ASSESSMENT — ACTIVITIES OF DAILY LIVING (ADL)
ADLS_ACUITY_SCORE: 24
ADLS_ACUITY_SCORE: 30
ADLS_ACUITY_SCORE: 26
ADLS_ACUITY_SCORE: 30
ADLS_ACUITY_SCORE: 26
ADLS_ACUITY_SCORE: 30
ADLS_ACUITY_SCORE: 26
ADLS_ACUITY_SCORE: 30
ADLS_ACUITY_SCORE: 30
ADLS_ACUITY_SCORE: 24

## 2023-01-18 NOTE — PROGRESS NOTES
[facility]    Medicine Progress Note - Hospitalist Service    Date of Admission:  1/15/2023    Assessment & Plan   Harrison Thomas is a 77 year old male with a complex PMH including thyroid CA (s/p thyroidectomy), severe COPD, CAD, HTN, DLD, RA, BRENNEN, RUL lung mass (probable malignancy) who was recently admitted from 1/9 to 1/14/23 following a  mechanical  fall with left 8th rib fracture and evaluated for shortness of breath . He was evaluated by pulmonology, treated for likely community acquired pneumonia .  He was discharged on 1/14/23 with PO Ceftin and pain control for rib fracture  with prn oxycodone , flexeril and lidocaine patch.     He was brought back to ED on 1/15/23 with altered mental status and a fall. He was noted to be significantly hypoxic, obtunded and hypotensive to SBP 80s. On admission noted worsened cr 1.22; lactic acid 7.6; BNP 2374, pro calcitonin 1.43; normal troponin, Blood glucose 66.      He follows up with Dr. Dumont and Pulmonology for probable lung cancer with possible radiation pneumonitis. He has been noted with RUL mass like lesion (PET positive). He underwent VATS biopsy (02/2022) by Dr Dumont but was noted to be incomplete due to a lot of scar tissue. He subsequently underwent empiric radiation therapy for enlarging right upper lobe density.      He was also recently hospitalized in 10/2022 following a fall, noted with pneumonia and/or radiation pneumonitis. Received a prolonged steroid taper at that time. In early December, 2022 he was treated empirically with Ceftin for 7 days (for CT showing scattered micro nodules with concern for possible infection). Several workup for Histo, Blasto, Legionella, Strep and Aspergillus were negative. Last seen in pulmonology clinic on 12/30/2022 at which time he had completed the prolonged steroid taper and had been off Oxygen since 12/18/22.     Probable septic shock  likely healthcare associated pneumonia  acute  on chronic hypoxic respiratory failure  lactic acidosis  metabolic encephalopathy, unclear etiology, likely opioid induced  recent admission for Closed fracture of L 8th rib s/p mechanical fall   Severe COPD/ emphysema  -  Has significant pulmonary history including severe COPD, right upper lobe density/mass, h/o radiation pneumonitis and recent hospitalizations as noted (as summarized above, also discussed below)  - as noted above, was hospitalized from 1/9 to 1/14/23 following a  mechanical  fall with left 8th rib fracture and SOB; was evaluated by pulmonology, and given recent worsening in the RUL, suspected likely infectious process; was treated for likely community acquired pneumonia with 4 days of Rocephin and was d/zabrina on PO Ceftin and pain control for rib fracture  with prn oxycodone , flexeril and lidocaine patch  - brought back on 1/15 with AMS and a fall; was noted significantly hypoxic, obtunded and hypotensive to SBP 80s. On admission noted worsened cr 1.22; lactic acid 7.6--> 4.2; BNP 2374, pro calcitonin 1.43; normal troponin, Blood glucose 66     - Unclear for his acute decompensation following discharge, but also he has significantly improved including mentation with supportive care and Narcan drip, thus some concern for likely unintentional opioid overdose  -  on admission CT head showed no acute intracranial process  - repeat CT chest 1/15 showed no PE and grossly unchanged masslike consolidation in the inferior right upper lobe and nodular consolidation in the right lower lobe, unchanged indexed pulmonary nodules  - started on empiric vancomycin and Zosyn for likely healthcare associated pneumonia with sepsis  - was started on BiPAP support;  hypotension responded to IV fluids/boluses     - weaned off of BiPAP to 2 L nasal cannula oxygen  - off Narcan drip, mentation has improved and stable  - no further episodes of hypotension; elevated BNP, worsened from last hospitalization; ECHO 1/16 UR with  LVEF 65-70%, no regional wall motion abnormalities noted; will continue to hold PTA Lasix   - remains afebrile, lactate normalized- 7.6- ---> 1.6; WBC slightly up 11.7---14.3; blood cultures with no growth so far; will discontinue vancomycin (1/17); continue Zosyn  - continue lidocaine patch for pain control from rib fracture  - encourage incentive spirometry; wean oxygen as able; might need some home oxygen  - continue nebulizers Q4 PRN ; continue Trelegy Ellipta (patient to use his own supply from home, d/w Pharmacy)      Diarrhea  mild hypokalemia  - having loose stools X 3 in 24 hrs; has been afebrile and no abdominal discomfort but given presentation with likely septic shock, worsening leukocytosis, will check stool for C diff (ordered 1/17/23)  - K 3.3-- will start potassium supplementation protocol  - probiotic  - add fiber  - prn simethicone, imodium     Abnormal CT chest with RUL density/mass dating back prior to 2/2022, h/o empiric radiation therapy  obstructive sleep apnea-- not using CPAP  H/o radiation pneumonitis  - follows at [facility] (last seen in clinic on 12/30/22) for his combination of severe COPD with emphysema and abnormal chest imaging with incomplete at attempted VATS lung biopsy and radiation for possible thyroid or lung cancer with possible radiation pneumonitis.  - was due to see Dr. Dumont 1/10 but unfortunately got admitted  - CT chest this admission as noted above  - evaluated by pulmonology during last hospitalization (signed off 1/12/23), suggested to continue antibiotics for likely CAP, repeat CT chest without contrast in 6 weeks     Urinary retention  likely acute renal failure  - renal function improved, creatinine1.22-----> 1.01;  - this admission noted difficulty voiding, needed intermittent straight cath with difficulty  - Mir catheter was placed (1/16/23) ; evaluated by urology-- suggested to continue Flomax  "0.4 mg daily (started 1/16) and to have voiding trial in 1 week; to follow up with Urology     CAD with hx Cx stenting 1997  HTN  HLD  [amlodipine 5 mg daily, aspirin 81 mg daily, furosemide 20 mg daily, metoprolol XL 50 mg day, rosuvastatin 10 mg daily]  Plan:  - Continue PTA meds  - holding PTA Lasix for now given the need for fluid boluses due to hypertension on presentation; ECHO 1/16 UR with LVEF 65-70%, no regional wall motion abnormalities noted  - BP stable; resumed PTA Toprol-XL (1/17/23) with hold parameters     Metastatic papillary thyroid cancer  Hypothyroidism  S/p total thyroidectomy 8/2021 with 4/4 LN positive. Treated with radioactive iodine ablation 9/2021.   -  continue  levothyroxine 137 mcg daily      BRENNEN (obstructive sleep apnea)  - does not use CPAP; continue to follow with pulmonology    Rheumatoid arthritis (H)  Follows with rheumatology. Not currently on medications for this       Diet: Regular Diet Adult    DVT Prophylaxis: Pneumatic Compression Devices  Mir Catheter: PRESENT, indication: Retention  Lines: None     Cardiac Monitoring: None  Code Status: No CPR- Do NOT Intubate      Clinically Significant Risk Factors        # Hypokalemia: Lowest K = 3.3 mmol/L in last 2 days, will replace as needed       # Hypoalbuminemia: Lowest albumin = 2.3 g/dL at 1/15/2023  3:22 AM, will monitor as appropriate   # Thrombocytopenia: Lowest platelets = 102 in last 2 days, will monitor for bleeding         # Overweight: Estimated body mass index is 28.19 kg/m  as calculated from the following:    Height as of 1/9/23: 1.626 m (5' 4\").    Weight as of this encounter: 74.5 kg (164 lb 3.9 oz)., PRESENT ON ADMISSION         Disposition Plan     Expected Discharge Date: 01/20/2023                  Leandro Beck MD  Hospitalist Service  [facility]  Securely message with VCharge (more info)  Text page via [employer] Paging/Directory "   ______________________________________________________________________    Interval History   Ongoing diarrhea.  Did get up and ambulate in [place] today  Feels bloated  No f/c/r.     Physical Exam   Vital Signs: Temp: 98.2  F (36.8  C) Temp src: Oral BP: (!) 150/79 Pulse: 63   Resp: 17 SpO2: 95 % O2 Device: Nasal cannula    Weight: 164 lbs 3.88 oz    Constitutional: Awake, alert, cooperative, no apparent distress  Respiratory: Clear to auscultation bilaterally, no crackles or wheezing  Cardiovascular: Regular rate and rhythm, normal S1 and S2, and no murmur noted  GI: Normal bowel sounds, soft, distended, non-tender  Skin/Integumen: No rashes, no cyanosis, no edema  Other:       Medical Decision Making       55 MINUTES SPENT BY ME on the date of service doing chart review, history, exam, documentation & further activities per the note.      Data     I have personally reviewed the following data over the past 24 hrs:    15.6 (H)  \   11.3 (L)   / 108 (L)     139 104 6.1 (L) /  76   3.7 21 (L) 0.69 \       Imaging results reviewed over the past 24 hrs:   No results found for this or any previous visit (from the past 24 hour(s)).

## 2023-01-18 NOTE — PLAN OF CARE
Summary: He was brought back to ED on 1/15/23 with altered mental status and a fall. He was noted to be significantly hypoxic, obtunded and hypotensive to SBP 80s  DATE & TIME: 1/17-01/18/23 Night shift  Cognitive Concerns/ Orientation : A & O x4, forgetful  BEHAVIOR & AGGRESSION TOOL   ABNL VS/O2: VSS on 1L   MOBILITY: SBA w/ walker and gait belt;  Using bedside commode at bedside.  PAIN MANAGMENT: Denies pain. Refused schedule Tylenol  DIET: Regular,good appetite.   BOWEL/BLADDER: Martinez in place, still with red tinged urine, MD aware; Loose stools x 3  ABNL LAB/BG: NA  DRAIN/DEVICES: 2 PIV SL. On IV Zosyn. Martinez for retention; Continue martinez, voiding trial in 1 week.  TELEMETRY RHYTHM: NA  SKIN: Dusky/fragile, scattered bruise, blotchy legs. Coccyx pink blanchable.    TESTS/PROCEDURES: C.diff PCR negative  D/C DAY/GOALS/PLACE: Still on IV antibiotics- patient states this the cause of his diarrhea; remains on 1L of O2  OTHER IMPORTANT INFO:  Enteric precautions in place. Calls appropriately and can be impulsive with getting out of bed. Urology following.

## 2023-01-18 NOTE — PLAN OF CARE
"Goal Outcome Evaluation:      Plan of Care Reviewed With: patient    Overall Patient Progress: no changeOverall Patient Progress: no change         Summary: He was brought back to ED on 1/15/23 with altered mental status and a fall. He was noted to be significantly hypoxic, obtunded and hypotensive to SBP 80s  DATE & TIME: 1/17/23 pm shift    Cognitive Concerns/ Orientation : A & O x4, forgetful  BEHAVIOR & AGGRESSION TOOL   ABNL VS/O2: VSS on 1L nasal cannula.   MOBILITY: SBA/A1 w/ walker and gait belt. Weak/ACEVES. Using bedside commode at bedside.  PAIN MANAGMENT: Denies pain. Refused schedule Tylenol  DIET: Regular, good appetite.   BOWEL/BLADDER: Martinez in place, still with red tinged urine, MD aware per AM nurse report. Loose stools x 7.  ABNL LAB/BG: K+ 3.3, replaced in the morning, recheck 4.0. Chloride 112, Calcium 7.6, WBC 14.3, Hgb 10.6.  DRAIN/DEVICES: 2 PIV SL. On iv Zosyn. Martinez for retention. Continue martinez, voiding trial in 1 week.  TELEMETRY RHYTHM: NA  SKIN: Dusky/fragile, scattered bruise, blotchy legs. Coccyx pink blanchable.    TESTS/PROCEDURES: C.diff PCR negative, antigen result pending.  D/C DAY/GOALS/PLACE: Per MD note \"in 1-2 days pending clinical stability. SW following for disposition to TCU\".    OTHER IMPORTANT INFO:  Enteric precautions in place. Calls appropriately and can be  impulsive with getting out of bed. Urology following.   "

## 2023-01-18 NOTE — PLAN OF CARE
"Summary: He was brought back to ED on 1/15/23 with altered mental status and a fall. He was noted to be significantly hypoxic, obtunded and hypotensive to SBP 80s  DATE & TIME: 01/18/23 7-3 pm  Cognitive Concerns/ Orientation : A & O x4, forgetful  BEHAVIOR & AGGRESSION TOOL   ABNL VS/O2: VSS on 1L, needs oxygen (2 L) with activity. At rest sometimes takes oxygen off and oxygen saturation 90-91%.   MOBILITY: SBA w/ walker and gait belt;  Using bedside commode at bedside.  PAIN MANAGMENT: Denies pain. Refused schedule Tylenol and Lidocaine patch.   DIET: Regular, poor appetite.   BOWEL/BLADDER: Martinez in place, urine has lightened back up to yellow. Loose stools x 5.   ABNL LAB/BG: Low BGM this am 56. Pt not eating, due to it increasing his BMs. Monitored throughout the day 61/76/88. Calcium 7.9, WBC 15.6, and platelet 108.   DRAIN/DEVICES: 2 PIV SL. Martinez for retention; Continue martinez, voiding trial in 1 week.  SKIN: Dusky/fragile, scattered bruise, blotchy legs. Coccyx pink blanchable.    TESTS/PROCEDURES: None scheduled.   D/C DAY/GOALS/PLACE: PT recommends TCU 1-2 days. Unsure of exact plan.   OTHER IMPORTANT INFO:  Enteric precautions discontinued. Calls appropriately and can be impulsive with getting out of bed. Ambulated hallway x2. Declined showered. LS diminished, re educated IS at bedside. Pt C/O of feeling \"bloated\", MD aware. PRN Simethicone administered. Imodium x1 and no BM since. Starting probiotic and Metamucil capsules. Appears mildly more edematous in legs.   "

## 2023-01-19 ENCOUNTER — APPOINTMENT (OUTPATIENT)
Dept: OCCUPATIONAL THERAPY | Facility: CLINIC | Age: 78
DRG: 871 | End: 2023-01-19
Payer: MEDICARE

## 2023-01-19 ENCOUNTER — PRE VISIT (OUTPATIENT)
Dept: UROLOGY | Facility: CLINIC | Age: 78
End: 2023-01-19
Payer: MEDICARE

## 2023-01-19 ENCOUNTER — APPOINTMENT (OUTPATIENT)
Dept: PHYSICAL THERAPY | Facility: CLINIC | Age: 78
DRG: 871 | End: 2023-01-19
Payer: MEDICARE

## 2023-01-19 LAB
ERYTHROCYTE [DISTWIDTH] IN BLOOD BY AUTOMATED COUNT: 17.1 % (ref 10–15)
GLUCOSE BLDC GLUCOMTR-MCNC: 130 MG/DL (ref 70–99)
HCT VFR BLD AUTO: 33.2 % (ref 40–53)
HGB BLD-MCNC: 11.1 G/DL (ref 13.3–17.7)
MCH RBC QN AUTO: 29.4 PG (ref 26.5–33)
MCHC RBC AUTO-ENTMCNC: 33.4 G/DL (ref 31.5–36.5)
MCV RBC AUTO: 88 FL (ref 78–100)
PLATELET # BLD AUTO: 119 10E3/UL (ref 150–450)
POTASSIUM SERPL-SCNC: 3.5 MMOL/L (ref 3.4–5.3)
RBC # BLD AUTO: 3.77 10E6/UL (ref 4.4–5.9)
WBC # BLD AUTO: 12.9 10E3/UL (ref 4–11)

## 2023-01-19 PROCEDURE — 99233 SBSQ HOSP IP/OBS HIGH 50: CPT | Performed by: STUDENT IN AN ORGANIZED HEALTH CARE EDUCATION/TRAINING PROGRAM

## 2023-01-19 PROCEDURE — 85027 COMPLETE CBC AUTOMATED: CPT | Performed by: STUDENT IN AN ORGANIZED HEALTH CARE EDUCATION/TRAINING PROGRAM

## 2023-01-19 PROCEDURE — 250N000013 HC RX MED GY IP 250 OP 250 PS 637: Performed by: HOSPITALIST

## 2023-01-19 PROCEDURE — 250N000011 HC RX IP 250 OP 636: Performed by: STUDENT IN AN ORGANIZED HEALTH CARE EDUCATION/TRAINING PROGRAM

## 2023-01-19 PROCEDURE — 97116 GAIT TRAINING THERAPY: CPT | Mod: GP

## 2023-01-19 PROCEDURE — 94640 AIRWAY INHALATION TREATMENT: CPT | Mod: 76

## 2023-01-19 PROCEDURE — 250N000009 HC RX 250: Performed by: HOSPITALIST

## 2023-01-19 PROCEDURE — 250N000011 HC RX IP 250 OP 636: Performed by: HOSPITALIST

## 2023-01-19 PROCEDURE — 36415 COLL VENOUS BLD VENIPUNCTURE: CPT | Performed by: STUDENT IN AN ORGANIZED HEALTH CARE EDUCATION/TRAINING PROGRAM

## 2023-01-19 PROCEDURE — 84132 ASSAY OF SERUM POTASSIUM: CPT | Performed by: STUDENT IN AN ORGANIZED HEALTH CARE EDUCATION/TRAINING PROGRAM

## 2023-01-19 PROCEDURE — 97535 SELF CARE MNGMENT TRAINING: CPT | Mod: GO

## 2023-01-19 PROCEDURE — 250N000013 HC RX MED GY IP 250 OP 250 PS 637: Performed by: STUDENT IN AN ORGANIZED HEALTH CARE EDUCATION/TRAINING PROGRAM

## 2023-01-19 PROCEDURE — 250N000009 HC RX 250: Performed by: STUDENT IN AN ORGANIZED HEALTH CARE EDUCATION/TRAINING PROGRAM

## 2023-01-19 PROCEDURE — 97530 THERAPEUTIC ACTIVITIES: CPT | Mod: GP

## 2023-01-19 PROCEDURE — 120N000001 HC R&B MED SURG/OB

## 2023-01-19 PROCEDURE — 999N000157 HC STATISTIC RCP TIME EA 10 MIN

## 2023-01-19 PROCEDURE — 94640 AIRWAY INHALATION TREATMENT: CPT

## 2023-01-19 RX ORDER — AMLODIPINE BESYLATE 5 MG/1
5 TABLET ORAL DAILY
Status: DISCONTINUED | OUTPATIENT
Start: 2023-01-19 | End: 2023-01-22 | Stop reason: HOSPADM

## 2023-01-19 RX ORDER — FUROSEMIDE 20 MG
20 TABLET ORAL 2 TIMES DAILY
Status: DISCONTINUED | OUTPATIENT
Start: 2023-01-19 | End: 2023-01-22 | Stop reason: HOSPADM

## 2023-01-19 RX ORDER — MULTIPLE VITAMINS W/ MINERALS TAB 9MG-400MCG
1 TAB ORAL DAILY
Status: DISCONTINUED | OUTPATIENT
Start: 2023-01-19 | End: 2023-01-22 | Stop reason: HOSPADM

## 2023-01-19 RX ADMIN — Medication 3 CAPSULE: at 16:22

## 2023-01-19 RX ADMIN — METOPROLOL SUCCINATE 50 MG: 50 TABLET, EXTENDED RELEASE ORAL at 09:36

## 2023-01-19 RX ADMIN — LOPERAMIDE HYDROCHLORIDE 2 MG: 2 CAPSULE ORAL at 19:19

## 2023-01-19 RX ADMIN — Medication 1 CAPSULE: at 21:46

## 2023-01-19 RX ADMIN — LOPERAMIDE HYDROCHLORIDE 2 MG: 2 CAPSULE ORAL at 12:53

## 2023-01-19 RX ADMIN — TAMSULOSIN HYDROCHLORIDE 0.4 MG: 0.4 CAPSULE ORAL at 21:46

## 2023-01-19 RX ADMIN — ACETYLCYSTEINE 2 ML: 200 SOLUTION ORAL; RESPIRATORY (INHALATION) at 13:23

## 2023-01-19 RX ADMIN — PIPERACILLIN AND TAZOBACTAM 4.5 G: 4; .5 INJECTION, POWDER, FOR SOLUTION INTRAVENOUS at 04:48

## 2023-01-19 RX ADMIN — IPRATROPIUM BROMIDE AND ALBUTEROL SULFATE 3 ML: 2.5; .5 SOLUTION RESPIRATORY (INHALATION) at 07:05

## 2023-01-19 RX ADMIN — ROSUVASTATIN CALCIUM 10 MG: 10 TABLET, FILM COATED ORAL at 09:36

## 2023-01-19 RX ADMIN — LOPERAMIDE HYDROCHLORIDE 2 MG: 2 CAPSULE ORAL at 09:35

## 2023-01-19 RX ADMIN — ASPIRIN 81 MG: 81 TABLET, COATED ORAL at 09:36

## 2023-01-19 RX ADMIN — MULTIPLE VITAMINS W/ MINERALS TAB 1 TABLET: TAB at 12:53

## 2023-01-19 RX ADMIN — ACETYLCYSTEINE 2 ML: 200 SOLUTION ORAL; RESPIRATORY (INHALATION) at 07:05

## 2023-01-19 RX ADMIN — DULOXETINE HYDROCHLORIDE 20 MG: 20 CAPSULE, DELAYED RELEASE ORAL at 21:46

## 2023-01-19 RX ADMIN — AMLODIPINE BESYLATE 5 MG: 5 TABLET ORAL at 12:53

## 2023-01-19 RX ADMIN — Medication 1 CAPSULE: at 09:35

## 2023-01-19 RX ADMIN — Medication 3 CAPSULE: at 21:46

## 2023-01-19 RX ADMIN — Medication 3 CAPSULE: at 09:37

## 2023-01-19 RX ADMIN — PIPERACILLIN AND TAZOBACTAM 4.5 G: 4; .5 INJECTION, POWDER, FOR SOLUTION INTRAVENOUS at 09:50

## 2023-01-19 RX ADMIN — DULOXETINE HYDROCHLORIDE 20 MG: 20 CAPSULE, DELAYED RELEASE ORAL at 09:35

## 2023-01-19 RX ADMIN — IPRATROPIUM BROMIDE AND ALBUTEROL SULFATE 3 ML: 2.5; .5 SOLUTION RESPIRATORY (INHALATION) at 13:23

## 2023-01-19 RX ADMIN — FUROSEMIDE 20 MG: 20 TABLET ORAL at 12:53

## 2023-01-19 RX ADMIN — IPRATROPIUM BROMIDE AND ALBUTEROL SULFATE 3 ML: 2.5; .5 SOLUTION RESPIRATORY (INHALATION) at 18:47

## 2023-01-19 RX ADMIN — ACETYLCYSTEINE 2 ML: 200 SOLUTION ORAL; RESPIRATORY (INHALATION) at 18:47

## 2023-01-19 RX ADMIN — PIPERACILLIN AND TAZOBACTAM 4.5 G: 4; .5 INJECTION, POWDER, FOR SOLUTION INTRAVENOUS at 21:46

## 2023-01-19 RX ADMIN — LEVOTHYROXINE SODIUM 137 MCG: 112 TABLET ORAL at 09:35

## 2023-01-19 RX ADMIN — PIPERACILLIN AND TAZOBACTAM 4.5 G: 4; .5 INJECTION, POWDER, FOR SOLUTION INTRAVENOUS at 16:23

## 2023-01-19 ASSESSMENT — ACTIVITIES OF DAILY LIVING (ADL)
ADLS_ACUITY_SCORE: 24
ADLS_ACUITY_SCORE: 25
ADLS_ACUITY_SCORE: 24

## 2023-01-19 NOTE — PROGRESS NOTES
[facility]  Patient is currently receiving home care services from [facility]. The patient is currently receiving RN and PT services.  and home health team have been notified of patient admission. [facility] liaison will continue to follow patient during stay. If appropriate provide orders to resume home care at time of discharge.

## 2023-01-19 NOTE — PLAN OF CARE
Goal Outcome Evaluation:      Plan of Care Reviewed With: patient    Overall Patient Progress: no changeOverall Patient Progress: no change         Summary: He was brought back to ED on 1/15/23 with altered mental status and a fall. He was noted to be significantly hypoxic, obtunded and hypotensive to SBP 80s  DATE & TIME: 01/18/23 pm shift  Cognitive Concerns/ Orientation : A & O x4, forgetful  BEHAVIOR & AGGRESSION TOOL   ABNL VS/O2: Bp 156/82. On 1L of O2.  MOBILITY: SBA w/ walker and gait belt;  Using bedside commode at bedside.  PAIN MANAGMENT: Denies pain. Refused schedule.  DIET: Regular.   BOWEL/BLADDER: Martinez in place, urine  yellow. Had 4 small loose stools. Probiotic med started.  ABNL LAB/BG:  Calcium 7.9, WBC 15.6, and platelet 108.   DRAIN/DEVICES: 2 PIV SL. Martinez for retention; Continue martinez, voiding trial in 1 week. On iv Zosyn.  SKIN: Dusky/fragile, scattered bruise, blotchy legs. Coccyx pink blanchable.    TESTS/PROCEDURES: None scheduled.   D/C DAY/GOALS/PLACE: PT recommends TCU. Discharge pending.  OTHER IMPORTANT INFO:  Enteric precautions discontinued. Calls appropriately. ACEVES. LS expiratory wheezing. On schedule neb treatment. IS use encouraged, patient can only reach 500 level.

## 2023-01-19 NOTE — PROGRESS NOTES
Antimicrobial Stewardship Team Note    Antimicrobial Stewardship Program - A joint venture between [facility] and [facility] to optimize antibiotic management.     Patient: Harrison Thomas  MRN: [id number]  Allergies: Levaquin, Plavix [clopidogrel bisulfate], Atorvastatin calcium, Cats, Dogs, Hctz [hydrochlorothiazide], and Sulfasalazine    Brief Summary: Harrison Thomas is a 77 year old male admitted on 1/15/23 after recent discharge for respiratory failure in the setting of rib fractures with obtundation, profound hypotension, and hypoxia. PMH significant for arthritis, CAD, lung cancer, metastatic papillary thyroid cancer s/p thyroidectomy, COPD, HTN, and asthma. Previous hospitalization was 1/9-1/14, treated for CAP with ceftriaxone and azithromycin and discharged on cefpodoxime. He was brought back the day after discharge with AMS and a fall.    Patient's blood pressure improved with Narcan administration on admission, suspicion for possible unintentional opioid overdose. He was started on Zosyn and vancomycin with concern for HAP. Significant pulmonary history including severe COPD, right upper lobe density/mass, h/o radiation pneumonitis, and recent hospitalizations. Repeat CT chest 1/15 with grossly unchanged masslike consolidation in RUL and nodular consolidation in RLL, unchanged indexed pulmonary nodules. Vancomycin was discontinued on 1/17 and patient remains on Zosyn.    WBC count 7.2 on 1/15, trended up to 15.6 yesterday in setting of steroid dose, down to 12.9 today. Afebrile this hospitalization and stable on 1 LPM O2. Sputum from 1/12 with normal ana luisa, blood cultures from 1/15 with no growth, C. Diff negative.         Active Anti-infective Medications   (From admission, onward)                 Start     Stop    01/16/23 0400  vancomycin in dextrose  1,000 mg,   Intravenous,   200 mL/hr,   EVERY 24 HOURS        Hospital-Acquired Pneumonia        --     01/15/23 1000  piperacillin-tazobactam  4.5 g,   Intravenous,   EVERY 6 HOURS        Hospital-Acquired Pneumonia        --                  Assessment: Possible HAP vs unintentional narcotic overdose causing respiratory failure  Patient presented 1 day after discharge with worsening AMS after a fall, found to be hypotensive but improved with Narcan drip administration. He was treated 5 days of ceftriaxone + azithromycin at previous hospitalization which was broadened to Zosyn and vancomycin this admission given concern for hospital acquired pneumonia. Imaging on admission did not show any new infiltrates or consolidations concerning for acute infection, suspect presentation less likely due to untreated bacterial pneumonia and rather due to other causes. Patient is currently on day 5 of Zosyn, day 10 of total antibiotics which is an adequate course for possible pneumonia. Recommend stopping Zosyn at this time in the absence of other signs/symptoms concerning for infection (afebrile, stable O2 requirements and imaging).    Recommendations:  Stop Zosyn.    Discussed with ID Staff MD Rhea Ambrosio, PharmD, BCIDP    Vital Signs/Clinical Features:  Vitals         01/17 0700  01/18 0659 01/18 0700  01/19 0659 01/19 0700  01/19 1321   Most Recent      Temp ( F) 97.7 -  98.4    97.8 -  98.2      97.5     97.5 (36.4) 01/19 0736    Pulse 65 -  76    63 -  70      76     76 01/19 0736    Resp 16 -  20    16 -  18      18     18 01/19 0736    /75 -  156/81    136/72 -  156/82      148/79     148/79 01/19 0736    SpO2 (%) 94 -  96    91 -  97    91 -  95     95 01/19 0736            Labs  Estimated Creatinine Clearance: 86.1 mL/min (based on SCr of 0.69 mg/dL).  Recent Labs   Lab Test 01/13/23  0627 01/14/23  0632 01/15/23  0322 01/16/23  0543 01/17/23  0726 01/18/23  0832   CR 0.73 0.73 1.22 1.01 0.75 0.69       Recent Labs   Lab Test 03/03/21  1010 05/10/21  0817 06/25/21  2120 07/02/21  0712  01/13/23  0627 01/14/23  0632 01/15/23  0322 01/16/23  0543 01/17/23  0726 01/18/23  0833 01/19/23  1000   WBC 7.1   < > 9.5   < > 8.4  --  7.2 11.7* 14.3* 15.6* 12.9*   ANEU 4.3  --  7.8  --  4.3  --  5.7  --  7.4 7.2  --    ALYM 1.5  --  0.9  --  0.5*  --  0.8  --  0.3* 0.6*  --    GINA 0.9  --  0.7  --  0.6  --  0.3  --  0.0 0.2  --    AEOS 0.4  --  0.1  --  2.9*  --  0.3  --  6.4* 7.5*  --    HGB 15.2   < > 15.0   < > 11.8*  --  14.3 10.9* 10.6* 11.3* 11.1*   HCT 46.2   < > 46.1   < > 36.1*  --  46.7 32.9* 31.7* 33.8* 33.2*   MCV 95   < > 90   < > 88  --  95 88 89 88 88      < > 208   < > 118*   < > 141* 94* 102* 108* 119*    < > = values in this interval not displayed.       Recent Labs   Lab Test 11/01/21  1236 03/16/22  1016 10/15/22  1027 11/15/22  1223 11/23/22  0725 01/15/23  0322   BILITOTAL 0.8 0.3 0.5 0.8 0.8 0.4   ALKPHOS 107 102 92 101 77 128   ALBUMIN 3.6 3.2* 2.7* 3.8 2.7* 2.3*   AST 15 13 13 20 15 99*   ALT 17 22 28 61* 54 55       Recent Labs   Lab Test 09/01/21  0953 11/01/21  1237 11/01/21  1241 03/16/22  1016 10/08/22  1250 10/08/22  1254 10/15/22  0619 10/15/22  1027 10/19/22  0651 11/15/22  1223 11/23/22  0725 12/03/22  1004 12/28/22  1045 01/10/23  0709 01/15/23  0320 01/15/23  0322 01/15/23  0427 01/15/23  0621 01/15/23  0734 01/15/23  1151   PCAL  --   --   --   --   --   --   --  0.28*  --   --   --  0.06*  --  0.39*  --  1.43*  --   --   --   --    LACT  --   --   --   --   --    < > 1.7  --   --   --   --   --   --   --  7.6*  --  4.2* 2.5* 3.7* 1.6   CRP 6.9   < >  --  <2.9  --    < >  --   --  8.1* 97.90* 95.2* 26.80* 8.0 181.0*  --   --   --   --   --   --    SED 11  --  29* 10 38*  --   --   --   --  18  --  24*  --   --   --   --   --   --   --   --     < > = values in this interval not displayed.             Culture Results:  7-Day Micro Results       Procedure Component Value Units Date/Time    C. difficile Toxin B PCR with reflex to C. difficile Antigen and Toxins A/B EIA  [[id number]]  (Normal) Collected: 01/17/23 0906    Order Status: Completed Lab Status: Final result Updated: 01/17/23 1440    Specimen: Stool from Per Rectum      C Difficile Toxin B by PCR Negative     Comment: A negative result does not exclude actual disease due to C. difficile and may be due to improper collection, handling and storage of the specimen or the number of organisms in the specimen is below the detection limit of the assay.       Narrative:      The [employer] Xpert C. difficile Assay, performed on the [employer]ument Systems, is a qualitative in vitro diagnostic test for rapid detection of toxin B gene sequences from unformed (liquid or soft) stool specimens collected from patients suspected of having Clostridioides difficile infection (CDI). The test utilizes automated real-time polymerase chain reaction (PCR) to detect toxin gene sequences associated with toxin producing C. difficile. The Xpert C. difficile Assay is intended as an aid in the diagnosis of CDI.    Urine Culture     Order Status: Canceled Lab Status: No result     Specimen: Urine     Blood Culture Line, venous [[id number]]  (Normal) Collected: 01/15/23 0330    Order Status: Completed Lab Status: Preliminary result Updated: 01/19/23 0531    Specimen: Blood from Line, venous      Culture No growth after 4 days    Blood Culture Line, venous [[id number]]  (Normal) Collected: 01/15/23 0326    Order Status: Completed Lab Status: Preliminary result Updated: 01/19/23 0531    Specimen: Blood from Line, venous      Culture No growth after 4 days    Respiratory Aerobic Bacterial Culture with Gram Stain [[id number]] Collected: 01/12/23 1833    Order Status: Completed Lab Status: Final result Updated: 01/14/23 1225    Specimen: Sputum from Expectorate      Culture 1+ Normal ana luisa     Gram Stain Result <10 Squamous epithelial cells/low power field      <25 PMNs/low power field      No organisms seen            Recent Labs   Lab  Test 19  1850 19  1145 19  1124 19  1225 01/15/23  1126   URINEPH 6.0 7.0 7.0 7.5* 5.5   NITRITE Negative Negative Positive* Positive* Negative   LEUKEST Negative Trace* Trace* Negative Negative   WBCU 1 1 0 - 5 5-10* 0                   Recent Labs   Lab Test 23  0906   CDBPCT Negative       Imaging: Echocardiogram Complete    Result Date: 2023  [id number]^CLEMENCIA^BALTA^ELLIS  [facility] [address] [place] [address]  Name: ABDIRASHID EPSTEIN MRN: [id number] : 1945 Study Date: 2023 10:20 AM Age: 77 yrs Gender: Male Patient Location: [facility] Reason For Study: SOB Ordering Physician: BALTA KHANNA Referring Physician: Yaneli Dozier Performed By: Radha Wilson  BSA: 1.8 m2 Height: 64 in Weight: 164 lb HR: 110 BP: 104/54 mmHg ______________________________________________________________________________ Procedure Complete Portable Echo Adult. Optison (NDC #[id number]) given intravenously. ______________________________________________________________________________ Interpretation Summary  1. Left ventricular systolic function is normal. The visual ejection fraction is 65-70%. 2. No regional wall motion abnormalities noted. 3. The right ventricle is normal in structure, function and size. 4. There is mild (1+) tricuspid regurgitation. 5. In comparison with the prior report, there has been no significant change. ______________________________________________________________________________ Left Ventricle The left ventricle is normal in size. There is normal left ventricular wall thickness. The visual ejection fraction is 65-70%. Left ventricular diastolic function is indeterminate. Left ventricular systolic function is normal. No regional wall motion abnormalities noted.  Right Ventricle The right ventricle is normal in structure, function and size.  Atria Normal left atrial size. Right  atrial size is normal. There is no color Doppler evidence of an atrial shunt.  Mitral Valve The mitral valve is normal in structure and function.  Tricuspid Valve The tricuspid valve is normal in structure and function. There is mild (1+) tricuspid regurgitation.  Aortic Valve The aortic valve is normal in structure and function.  Pulmonic Valve The pulmonic valve is not well seen, but is grossly normal.  Vessels Normal size aorta. IVC diameter <2.1 cm collapsing >50% with sniff suggests a normal RA pressure of 3 mmHg.  Pericardium There is no pericardial effusion.  ______________________________________________________________________________ MMode/2D Measurements & Calculations IVSd: 1.1 cm LVIDd: 4.3 cm LVIDs: 2.7 cm LVPWd: 1.1 cm FS: 37.7 % LV mass(C)d: 161.9 grams LV mass(C)dI: 90.1 grams/m2  Ao root diam: 3.6 cm LA dimension: 4.1 cm asc Aorta Diam: 2.9 cm LA/Ao: 1.1 LA Volume (BP): 45.2 ml LA Volume Index (BP): 25.1 ml/m2 RWT: 0.51 TAPSE: 2.1 cm  ______________________________________________________________________________ Report approved by: Dell Maldonado 01/16/2023 12:14 PM       XR Chest Port 1 View    Result Date: 1/15/2023  EXAM: XR CHEST PORT 1 VIEW LOCATION: [facility] DATE/TIME: 1/15/2023 5:56 AM INDICATION: Repeat drop BP, on PPV with rib fx, ? development of PTX vs pulmonary edema. COMPARISON: CT pulmonary angiogram 1/15/2023.     IMPRESSION: The patient is rotated and tilted. Masslike infiltrate right upper lobe. Slight platelike atelectasis in the lower lungs. No pleural effusion on either side. Normal cardiac size and pulmonary vascularity. Degenerative changes both shoulders and the spine. Monitoring leads overlying the chest. Left convex thoracic curve.    XR Chest Port 1 View    Result Date: 1/15/2023  EXAM: XR CHEST PORT 1 VIEW LOCATION: [facility] DATE/TIME: 1/15/2023 3:30 AM INDICATION: dyspnea COMPARISON: Chest CT 01/09/2023      IMPRESSION: Reidentification of right upper lobe consolidation though likely improved as compared to the prior CT from 01/9/23. Possible small right pleural effusion. Left lung is clear. Stable heart size. Atherosclerotic calcifications of the aortic arch. No acute osseous abnormality.    CT Chest Pulmonary Embolism w Contrast    Result Date: 1/15/2023  EXAM: CT CHEST PULMONARY EMBOLISM W CONTRAST LOCATION: [facility] DATE/TIME: 1/15/2023 4:02 AM INDICATION: acute on chronic hypoxic resp. failure COMPARISON: CT chest without contrast 01/09/2023 TECHNIQUE: CT chest pulmonary angiogram during arterial phase injection of IV contrast. Multiplanar reformats and MIP reconstructions were performed. Dose reduction techniques were used. CONTRAST: 63  ml Isovue 370 FINDINGS: ANGIOGRAM CHEST: Pulmonary arteries are normal caliber and negative for pulmonary emboli. Thoracic aorta is negative for dissection. No CT evidence of right heart strain. LUNGS AND PLEURA: Moderate upper lobe predominant centrilobular and paraseptal emphysema. Grossly unchanged masslike consolidation in the inferior aspect of the right upper lobe. Similar nodular consolidation in the right lower lobe. ?Scattered pulmonary  nodules are again noted with index left upper lobe nodule measuring 0.5 cm, unchanged (series 9 image 28) and left lower lobe nodule measuring 0.6 cm (series 9 image 196), previously 0.5 cm. MEDIASTINUM/AXILLAE: Overall slightly decreased size of multiple mediastinal lymph nodes with right paratracheal node measuring 1.8 cm cm in short axis (series 7 image 97), previously 2.2 cm. No thoracic aortic aneurysm. CORONARY ARTERY CALCIFICATION: Previous intervention (stents). UPPER ABDOMEN: Right renal cyst. Atherosclerotic calcifications of the upper abdominal aorta. MUSCULOSKELETAL: Subacute, minimally-displaced left lateral eighth rib fracture. Additional healed left rib fractures are unchanged.      IMPRESSION: 1.  No evidence of pulmonary embolus. 2.  Grossly unchanged masslike consolidation in the inferior right upper lobe and nodular consolidation in the right lower lobe. Recommend followup Chest CT to ensure resolution. 3.  Unchanged indexed pulmonary nodules. 4.  Overall slightly decreased size of multiple mediastinal lymph nodes, likely reactive.    Head CT w/o contrast    Result Date: 1/15/2023  EXAM: CT HEAD W/O CONTRAST LOCATION: [facility] DATE/TIME: 1/15/2023 4:02 AM INDICATION: Traumatic injury COMPARISON: None. TECHNIQUE: Routine CT Head without IV contrast. Multiplanar reformats. Dose reduction techniques were used. FINDINGS: INTRACRANIAL CONTENTS: No intracranial hemorrhage, extraaxial collection, or mass effect.  No CT evidence of acute infarct. Mild presumed chronic small vessel ischemic changes. Mild to moderate generalized volume loss. No hydrocephalus. VISUALIZED ORBITS/SINUSES/MASTOIDS: Prior bilateral cataract surgery. Visualized portions of the orbits are otherwise unremarkable. Severe mucosal thickening scattered about the paranasal sinuses, with complete opacification of the ethmoid air cells and near complete opacification of the frontal sinuses. No middle ear or mastoid effusion. BONES/SOFT TISSUES: No acute abnormality.     IMPRESSION: 1.  No CT evidence for acute intracranial process. 2.  Brain atrophy and presumed chronic microvascular ischemic changes as above. 3.  Pansinusitis.

## 2023-01-19 NOTE — TELEPHONE ENCOUNTER
Reason for visit: Follow-Up    Dx/Hx/Sx: BPH w/ LUTS    Records/imaging/labs/orders: In EPIC    At Rooming: UA; PVR    Donald Mohamud, JOANN  January 19, 2023  8:22 AM

## 2023-01-19 NOTE — PROGRESS NOTES
[facility]    Medicine Progress Note - Hospitalist Service    Date of Admission:  1/15/2023    Assessment & Plan   Harrison Thomas is a 77 year old male with a complex PMH including thyroid CA (s/p thyroidectomy), severe COPD, CAD, HTN, DLD, RA, BRENNEN, RUL lung mass (probable malignancy) who was recently admitted from 1/9 to 1/14/23 following a  mechanical  fall with left 8th rib fracture and evaluated for shortness of breath . He was evaluated by pulmonology, treated for likely community acquired pneumonia .  He was discharged on 1/14/23 with PO Ceftin and pain control for rib fracture  with prn oxycodone , flexeril and lidocaine patch.     He was brought back to ED on 1/15/23 with altered mental status and a fall. He was noted to be significantly hypoxic, obtunded and hypotensive to SBP 80s. On admission noted worsened cr 1.22; lactic acid 7.6; BNP 2374, pro calcitonin 1.43; normal troponin, Blood glucose 66.  It is unclear what caused this rapid return to hospital. Question opiate overdose as he responded narcan, but wife reports she counted pills and there were none missing. Possible he had aspiration.      He follows up with Dr. Dumont and Pulmonology for probable lung cancer with possible radiation pneumonitis. He has been noted with RUL mass like lesion (PET positive). He underwent VATS biopsy (02/2022) by Dr Dumont but was noted to be incomplete due to a lot of scar tissue. He subsequently underwent empiric radiation therapy for enlarging right upper lobe density.      He was also recently hospitalized in 10/2022 following a fall, noted with pneumonia and/or radiation pneumonitis. Received a prolonged steroid taper at that time. In early December, 2022 he was treated empirically with Ceftin for 7 days (for CT showing scattered micro nodules with concern for possible infection). Several workup for Histo, Blasto, Legionella, Strep and Aspergillus were negative. Last seen in  pulmonology clinic on 12/30/2022 at which time he had completed the prolonged steroid taper and had been off Oxygen since 12/18/22.     Septic shock, resolved  Healthcare associated pneumonia vs aspiration PNA  Acute on chronic hypoxic respiratory failure  lactic acidosis  metabolic encephalopathy, unclear etiology, likely opioid induced  recent admission for Closed fracture of L 8th rib s/p mechanical fall   Severe COPD/ emphysema  -  Has significant pulmonary history including severe COPD, right upper lobe density/mass, h/o radiation pneumonitis and recent hospitalizations as noted (as summarized above, also discussed below)  - as noted above, was hospitalized from 1/9 to 1/14/23 following a  mechanical  fall with left 8th rib fracture and SOB; was evaluated by pulmonology, and given recent worsening in the RUL, suspected likely infectious process; was treated for likely community acquired pneumonia with 4 days of Rocephin and was d/zabrina on PO Ceftin and pain control for rib fracture  with prn oxycodone , flexeril and lidocaine patch  - brought back on 1/15 with AMS and a fall; was noted significantly hypoxic, obtunded and hypotensive to SBP 80s. On admission noted worsened cr 1.22; lactic acid 7.6--> 4.2; BNP 2374, pro calcitonin 1.43; normal troponin, Blood glucose 66     - Unclear for his acute decompensation following discharge, but also he has significantly improved including mentation with supportive care and Narcan drip, thus some concern for likely unintentional opioid overdose although wife reports there were no missing oxycodones.  -  on admission CT head showed no acute intracranial process  - repeat CT chest 1/15 showed no PE and grossly unchanged masslike consolidation in the inferior right upper lobe and nodular consolidation in the right lower lobe, unchanged indexed pulmonary nodules  - started on empiric vancomycin and Zosyn for likely healthcare associated pneumonia with sepsis  - was started on  BiPAP support;  hypotension responded to IV fluids/boluses     - weaned off of BiPAP to 2 L nasal cannula oxygen  - off Narcan drip, mentation has improved and stable  - no further episodes of hypotension; elevated BNP, worsened from last hospitalization; ECHO 1/16 UR with LVEF 65-70%, no regional wall motion abnormalities noted; will continue to hold PTA Lasix   - remains afebrile  - stop zosyn on 1/20  - continue lidocaine patch for pain control from rib fracture  - encourage incentive spirometry; wean oxygen as able; might need some home oxygen  - continue nebulizers Q4 PRN ; continue Trelegy Ellipta (patient to use his own supply from home, d/w Pharmacy)      Diarrhea, improving  Mild hypokalemia  - having loose stools X 3 in 24 hrs; has been afebrile and no abdominal discomfort but given presentation with likely septic shock, worsening leukocytosis  - cdiff negative  - K 3.3-- will start potassium supplementation protocol  - probiotic  - add fiber  - prn simethicone, imodium     Abnormal CT chest with RUL density/mass dating back prior to 2/2022, h/o empiric radiation therapy  obstructive sleep apnea-- not using CPAP  H/o radiation pneumonitis  - follows at [facility] (last seen in clinic on 12/30/22) for his combination of severe COPD with emphysema and abnormal chest imaging with incomplete at attempted VATS lung biopsy and radiation for possible thyroid or lung cancer with possible radiation pneumonitis.  - was due to see Dr. Dumont 1/10 but unfortunately got admitted  - CT chest this admission as noted above  - evaluated by pulmonology during last hospitalization (signed off 1/12/23), suggested to continue antibiotics for likely CAP, repeat CT chest without contrast in 6 weeks     Urinary retention  likely acute renal failure  - renal function improved, creatinine1.22-----> 1.01;  - this admission noted difficulty voiding, needed intermittent  "straight cath with difficulty  - Mir catheter was placed (1/16/23) ; evaluated by urology-- suggested to continue Flomax 0.4 mg daily (started 1/16) and to have voiding trial in 1 week; to follow up with Urology     CAD with hx Cx stenting 1997  HTN  HLD  [amlodipine 5 mg daily, aspirin 81 mg daily, furosemide 20 mg daily, metoprolol XL 50 mg day, rosuvastatin 10 mg daily]  Plan:  - Continue PTA meds  - holding PTA Lasix for now given the need for fluid boluses due to hypertension on presentation; ECHO 1/16 UR with LVEF 65-70%, no regional wall motion abnormalities noted  - BP stable; resumed PTA Toprol-XL (1/17/23) with hold parameters     Metastatic papillary thyroid cancer  Hypothyroidism  S/p total thyroidectomy 8/2021 with 4/4 LN positive. Treated with radioactive iodine ablation 9/2021.   -  continue  levothyroxine 137 mcg daily      BRENNEN (obstructive sleep apnea)  - does not use CPAP; continue to follow with pulmonology    Rheumatoid arthritis (H)  Follows with rheumatology. Not currently on medications for this       Diet: Regular Diet Adult    DVT Prophylaxis: Pneumatic Compression Devices  Mir Catheter: PRESENT, indication: Retention  Lines: None     Cardiac Monitoring: None  Code Status: No CPR- Do NOT Intubate      Clinically Significant Risk Factors              # Hypoalbuminemia: Lowest albumin = 2.3 g/dL at 1/15/2023  3:22 AM, will monitor as appropriate   # Thrombocytopenia: Lowest platelets = 108 in last 2 days, will monitor for bleeding         # Overweight: Estimated body mass index is 28.67 kg/m  as calculated from the following:    Height as of 1/9/23: 1.626 m (5' 4\").    Weight as of this encounter: 75.8 kg (167 lb).          Disposition Plan      Expected Discharge Date: 01/21/2023              suspect he will be able to discharge sometime from 1/20-1/22. Ideally would have some improvement in diarrhea prior to discharge.     Leandro Beck MD  Hospitalist Service  [facility] " [facility]  Securely message with [employer] (more info)  Text page via [employer] Paging/Directory   ______________________________________________________________________    Interval History   Diarrhea improved.  Sob unchanged with activity  Appetite improving  Up ambulating  Want to discharge to TCU, referral made to megan   Likely will be able to discharge Friday or Saturday pending diarrhea and placement  Abx stewardship rec'd stopping abx today. He is improving so that seems reasonable     Physical Exam   Vital Signs: Temp: 98.4  F (36.9  C) Temp src: Oral BP: (!) 146/83 Pulse: 78   Resp: 18 SpO2: 96 % O2 Device: Nasal cannula Oxygen Delivery: 1 LPM  Weight: 167 lbs 0 oz    Constitutional: Awake, alert, cooperative, no apparent distress  Respiratory: Clear to auscultation bilaterally, no crackles or wheezing  Cardiovascular: Regular rate and rhythm, normal S1 and S2, and no murmur noted  GI: Normal bowel sounds, soft, distended, non-tender  Skin/Integumen: No rashes, no cyanosis, no edema  Other:       Medical Decision Making       55 MINUTES SPENT BY ME on the date of service doing chart review, history, exam, documentation & further activities per the note.      Data     I have personally reviewed the following data over the past 24 hrs:    12.9 (H)  \   11.1 (L)   / 119 (L)     N/A N/A N/A /  N/A   3.5 N/A N/A \       Imaging results reviewed over the past 24 hrs:   No results found for this or any previous visit (from the past 24 hour(s)).

## 2023-01-19 NOTE — PLAN OF CARE
Summary: He was brought back to ED on 1/15/23 with altered mental status and a fall. He was noted to be significantly hypoxic, obtunded and hypotensive to SBP 80s  DATE & TIME: 01/18-1/19/23 Night shift  Cognitive Concerns/ Orientation : A & O x4, forgetful  BEHAVIOR & AGGRESSION TOOL   ABNL VS/O2: VSS on 1L of O2.  MOBILITY: SBA w/ walker and gait belt;  Using bedside commode at bedside- moves well  PAIN MANAGMENT: Denies pain. Refused schedule tylenol (since admission)  DIET: Regular  BOWEL/BLADDER: Martinez in place, urine  yellow; Had 1 small loose stools this shift.   ABNL LAB/BG:  WBC 15.6  DRAIN/DEVICES: 2 PIV SL. Martinez for retention; Continue martinez, voiding trial in 1 week.  SKIN: Dusky/fragile, scattered bruise, blotchy legs- edematous; Coccyx pink blanchable.    TESTS/PROCEDURES: None scheduled.   D/C DAY/GOALS/PLACE: PT recommends TCU. Discharge pending.  OTHER IMPORTANT INFO:  ACEVES. LS expiratory wheezing. On schedule neb treatment. IS use encouraged.

## 2023-01-19 NOTE — PROGRESS NOTES
Care Coordination Note  Wife called early this week, asking that [facility] transfer [facility].  PT has worked with patient here and per their note he agrees to consider [facility].  Writer has made a referral to [facility].

## 2023-01-19 NOTE — PLAN OF CARE
Summary: He was brought back to ED on 1/15/23 with altered mental status and a fall. He was noted to be significantly hypoxic, obtunded and hypotensive to SBP 80s  DATE & TIME: 01/19/23 7-3 pm  Cognitive Concerns/ Orientation : A & O x4, forgetful.  ABNL VS/O2: VSS on 1L of O2. Needs 2L of oxygen with walking/activity.   MOBILITY: SBA w/ walker and gait belt;  Using bedside commode at bedside- moves well  PAIN MANAGMENT: Denies pain. Refused schedule tylenol and Lidocaine patch.  DIET: Regular  BOWEL/BLADDER: Martinez in place, urine  yellow; Had multiple loose BMs  ABNL LAB/BG:  WBC 12.9   DRAIN/DEVICES: 2 PIV SL. Martinez for retention; Continue martinez at discharge.   SKIN: Dusky/fragile, scattered bruise, blotchy legs- edematous; Coccyx pink blanchable.    TESTS/PROCEDURES: None scheduled.   D/C DAY/GOALS/PLACE: PT recommends TCU. Discharge pending. 1-2 days pending GI symptoms.  OTHER IMPORTANT INFO: Doing well. Still having loose frequent/urgent stool. PRN Imodium x2. ACEVES. IS use encouraged. Showered. Ambulated hallway x2.

## 2023-01-20 ENCOUNTER — APPOINTMENT (OUTPATIENT)
Dept: PHYSICAL THERAPY | Facility: CLINIC | Age: 78
DRG: 871 | End: 2023-01-20
Payer: MEDICARE

## 2023-01-20 ENCOUNTER — APPOINTMENT (OUTPATIENT)
Dept: OCCUPATIONAL THERAPY | Facility: CLINIC | Age: 78
DRG: 871 | End: 2023-01-20
Payer: MEDICARE

## 2023-01-20 LAB
ANION GAP SERPL CALCULATED.3IONS-SCNC: 9 MMOL/L (ref 7–15)
BACTERIA BLD CULT: NO GROWTH
BACTERIA BLD CULT: NO GROWTH
BUN SERPL-MCNC: 3.4 MG/DL (ref 8–23)
CALCIUM SERPL-MCNC: 8.3 MG/DL (ref 8.8–10.2)
CHLORIDE SERPL-SCNC: 102 MMOL/L (ref 98–107)
CREAT SERPL-MCNC: 0.74 MG/DL (ref 0.67–1.17)
DEPRECATED HCO3 PLAS-SCNC: 30 MMOL/L (ref 22–29)
ERYTHROCYTE [DISTWIDTH] IN BLOOD BY AUTOMATED COUNT: 17.4 % (ref 10–15)
GFR SERPL CREATININE-BSD FRML MDRD: >90 ML/MIN/1.73M2
GLUCOSE SERPL-MCNC: 86 MG/DL (ref 70–99)
HCT VFR BLD AUTO: 35.6 % (ref 40–53)
HGB BLD-MCNC: 11.8 G/DL (ref 13.3–17.7)
MCH RBC QN AUTO: 29.5 PG (ref 26.5–33)
MCHC RBC AUTO-ENTMCNC: 33.1 G/DL (ref 31.5–36.5)
MCV RBC AUTO: 89 FL (ref 78–100)
PLATELET # BLD AUTO: 141 10E3/UL (ref 150–450)
POTASSIUM SERPL-SCNC: 3.3 MMOL/L (ref 3.4–5.3)
RBC # BLD AUTO: 4 10E6/UL (ref 4.4–5.9)
SODIUM SERPL-SCNC: 141 MMOL/L (ref 136–145)
WBC # BLD AUTO: 12.6 10E3/UL (ref 4–11)

## 2023-01-20 PROCEDURE — 97530 THERAPEUTIC ACTIVITIES: CPT | Mod: GP | Performed by: PHYSICAL THERAPIST

## 2023-01-20 PROCEDURE — 250N000013 HC RX MED GY IP 250 OP 250 PS 637: Performed by: HOSPITALIST

## 2023-01-20 PROCEDURE — 250N000013 HC RX MED GY IP 250 OP 250 PS 637: Performed by: STUDENT IN AN ORGANIZED HEALTH CARE EDUCATION/TRAINING PROGRAM

## 2023-01-20 PROCEDURE — 36415 COLL VENOUS BLD VENIPUNCTURE: CPT | Performed by: STUDENT IN AN ORGANIZED HEALTH CARE EDUCATION/TRAINING PROGRAM

## 2023-01-20 PROCEDURE — 120N000001 HC R&B MED SURG/OB

## 2023-01-20 PROCEDURE — 97110 THERAPEUTIC EXERCISES: CPT | Mod: GO

## 2023-01-20 PROCEDURE — 80048 BASIC METABOLIC PNL TOTAL CA: CPT | Performed by: STUDENT IN AN ORGANIZED HEALTH CARE EDUCATION/TRAINING PROGRAM

## 2023-01-20 PROCEDURE — 97110 THERAPEUTIC EXERCISES: CPT | Mod: GP | Performed by: PHYSICAL THERAPIST

## 2023-01-20 PROCEDURE — 250N000011 HC RX IP 250 OP 636: Performed by: STUDENT IN AN ORGANIZED HEALTH CARE EDUCATION/TRAINING PROGRAM

## 2023-01-20 PROCEDURE — 97535 SELF CARE MNGMENT TRAINING: CPT | Mod: GO

## 2023-01-20 PROCEDURE — 85027 COMPLETE CBC AUTOMATED: CPT | Performed by: STUDENT IN AN ORGANIZED HEALTH CARE EDUCATION/TRAINING PROGRAM

## 2023-01-20 PROCEDURE — 99233 SBSQ HOSP IP/OBS HIGH 50: CPT | Performed by: HOSPITALIST

## 2023-01-20 RX ORDER — POTASSIUM CHLORIDE 1.5 G/1.58G
40 POWDER, FOR SOLUTION ORAL ONCE
Status: DISCONTINUED | OUTPATIENT
Start: 2023-01-20 | End: 2023-01-20 | Stop reason: DRUGHIGH

## 2023-01-20 RX ORDER — POTASSIUM CHLORIDE 1.5 G/1.58G
20 POWDER, FOR SOLUTION ORAL 2 TIMES DAILY
Status: COMPLETED | OUTPATIENT
Start: 2023-01-20 | End: 2023-01-20

## 2023-01-20 RX ADMIN — AMOXICILLIN AND CLAVULANATE POTASSIUM 1 TABLET: 875; 125 TABLET, FILM COATED ORAL at 20:23

## 2023-01-20 RX ADMIN — LOPERAMIDE HYDROCHLORIDE 2 MG: 2 CAPSULE ORAL at 11:34

## 2023-01-20 RX ADMIN — Medication 3 CAPSULE: at 22:06

## 2023-01-20 RX ADMIN — ROSUVASTATIN CALCIUM 10 MG: 10 TABLET, FILM COATED ORAL at 09:26

## 2023-01-20 RX ADMIN — Medication 3 CAPSULE: at 12:48

## 2023-01-20 RX ADMIN — POTASSIUM CHLORIDE 20 MEQ: 1.5 POWDER, FOR SOLUTION ORAL at 16:22

## 2023-01-20 RX ADMIN — ASPIRIN 81 MG: 81 TABLET, COATED ORAL at 09:25

## 2023-01-20 RX ADMIN — MULTIPLE VITAMINS W/ MINERALS TAB 1 TABLET: TAB at 12:48

## 2023-01-20 RX ADMIN — PIPERACILLIN AND TAZOBACTAM 4.5 G: 4; .5 INJECTION, POWDER, FOR SOLUTION INTRAVENOUS at 10:24

## 2023-01-20 RX ADMIN — FUROSEMIDE 20 MG: 20 TABLET ORAL at 12:48

## 2023-01-20 RX ADMIN — FUROSEMIDE 20 MG: 20 TABLET ORAL at 09:25

## 2023-01-20 RX ADMIN — DULOXETINE HYDROCHLORIDE 20 MG: 20 CAPSULE, DELAYED RELEASE ORAL at 09:26

## 2023-01-20 RX ADMIN — DULOXETINE HYDROCHLORIDE 20 MG: 20 CAPSULE, DELAYED RELEASE ORAL at 20:23

## 2023-01-20 RX ADMIN — POTASSIUM CHLORIDE 20 MEQ: 1.5 POWDER, FOR SOLUTION ORAL at 20:23

## 2023-01-20 RX ADMIN — LEVOTHYROXINE SODIUM 137 MCG: 112 TABLET ORAL at 09:25

## 2023-01-20 RX ADMIN — Medication 1 CAPSULE: at 09:27

## 2023-01-20 RX ADMIN — Medication 1 CAPSULE: at 20:23

## 2023-01-20 RX ADMIN — LOPERAMIDE HYDROCHLORIDE 2 MG: 2 CAPSULE ORAL at 20:23

## 2023-01-20 RX ADMIN — TAMSULOSIN HYDROCHLORIDE 0.4 MG: 0.4 CAPSULE ORAL at 22:06

## 2023-01-20 RX ADMIN — AMLODIPINE BESYLATE 5 MG: 5 TABLET ORAL at 09:26

## 2023-01-20 RX ADMIN — Medication 3 CAPSULE: at 18:07

## 2023-01-20 RX ADMIN — PIPERACILLIN AND TAZOBACTAM 4.5 G: 4; .5 INJECTION, POWDER, FOR SOLUTION INTRAVENOUS at 03:56

## 2023-01-20 RX ADMIN — METOPROLOL SUCCINATE 50 MG: 50 TABLET, EXTENDED RELEASE ORAL at 09:26

## 2023-01-20 ASSESSMENT — ACTIVITIES OF DAILY LIVING (ADL)
ADLS_ACUITY_SCORE: 24
ADLS_ACUITY_SCORE: 25
ADLS_ACUITY_SCORE: 24
DEPENDENT_IADLS:: CLEANING;COOKING;LAUNDRY;SHOPPING;MEAL PREPARATION;TRANSPORTATION
ADLS_ACUITY_SCORE: 24
ADLS_ACUITY_SCORE: 24
ADLS_ACUITY_SCORE: 27
ADLS_ACUITY_SCORE: 24

## 2023-01-20 NOTE — PLAN OF CARE
Goal Outcome Evaluation:      Plan of Care Reviewed With: patient    Overall Patient Progress: improvingOverall Patient Progress: improving         Summary: He was brought back to ED on 1/15/23 with altered mental status and a fall. He was noted to be significantly hypoxic, obtunded and hypotensive to SBP 80s  DATE & TIME: 1/20/23 Night shift  Cognitive Concerns/ Orientation : A & O x4, forgetful  BEHAVIOR & AGGRESSION TOOL   ABNL VS/O2: VSS on 1L of O2.   MOBILITY: SBA w/ walker and gait belt.  PAIN MANAGMENT: Denies pain. Refused schedule Tylenol.  DIET: Regular.   BOWEL/BLADDER: Martinez in place, urine  clear yellow. No bm.   ABNL LAB/BG:  None  DRAIN/DEVICES: 2 PIV SL. Martinez for retention; Continue martinez at discharge.   SKIN: Dusky/fragile, scattered bruise, blotchy legs- edematous; Coccyx pink blanchable.    TESTS/PROCEDURES: None scheduled.   D/C DAY/GOALS/PLACE: PT recommends TCU. Discharge pending.  OTHER IMPORTANT INFO:  ACEVES. LS diminished posteriorly. Patient on po Lasix BID.

## 2023-01-20 NOTE — PLAN OF CARE
Goal Outcome Evaluation:      Plan of Care Reviewed With: patient    Overall Patient Progress: improvingOverall Patient Progress: improving         Summary: He was brought back to ED on 1/15/23 with altered mental status and a fall. He was noted to be significantly hypoxic, obtunded and hypotensive to SBP 80s  DATE & TIME: 1/19/23 pm shift  Cognitive Concerns/ Orientation : A & O x4, forgetful  BEHAVIOR & AGGRESSION TOOL   ABNL VS/O2: VSS on 1L of O2. Needs 2L of oxygen with walking/activity.   MOBILITY: SBA w/ walker and gait belt;  Using bedside commode at bedside- moves well  PAIN MANAGMENT: Denies pain. Refused schedule Tylenol.  DIET: Regular. Fair appetite.  BOWEL/BLADDER: Martinez in place, urine  clear yellow. No bm. Gave PRN Imodium per patient request.  ABNL LAB/BG:  WBC 12.9   DRAIN/DEVICES: 2 PIV SL. Martinez for retention; Continue martinez at discharge.   SKIN: Dusky/fragile, scattered bruise, blotchy legs- edematous; Coccyx pink blanchable.    TESTS/PROCEDURES: None scheduled.   D/C DAY/GOALS/PLACE: PT recommends TCU. Discharge pending.  OTHER IMPORTANT INFO:  ACEVES. LS diminished posteriorly. IS use encouraged.

## 2023-01-20 NOTE — PLAN OF CARE
Goal Outcome Evaluation:                      Summary: He was brought back to ED on 1/15/23 with altered mental status and a fall. He was noted to be significantly hypoxic, obtunded and hypotensive to SBP 80s  DATE & TIME: 1/20/23 07-19  Cognitive Concerns/ Orientation : A & O x4,   BEHAVIOR & AGGRESSION TOOL   ABNL VS/O2: VSS on 1L of O2., takes on and off per self.  MOBILITY: independent with walker              PAIN MANAGMENT: Denies pain. Refused schedule Tylenol and lidoderm patch  DIET: Regular.   BOWEL/BLADDER: Martinez in place, urine  clear yellow. Loose x1, had immodium  ABNL LAB/BG:  None  DRAIN/DEVICES: 2 PIV SL. Martinez for retention; Continue martinez at discharge.   SKIN: Dusky/fragile, scattered bruise, blotchy legs- edematous; Coccyx pink blanchable.    TESTS/PROCEDURES: None scheduled.   D/C DAY/GOALS/PLACE: PT recommends home with wife Discharge pending.  OTHER IMPORTANT INFO:  ACEVES. LS diminished posteriorly. Patient on po Lasix BID.

## 2023-01-20 NOTE — PROGRESS NOTES
[facility]    Medicine Progress Note - Hospitalist Service    Date of Admission:  1/15/2023    Assessment & Plan   Harrison Thomas is a 77 year old male with a complex PMH including thyroid CA (s/p thyroidectomy), severe COPD, CAD, HTN, DLD, RA, BRENNEN, RUL lung mass (probable malignancy) who was recently admitted from 1/9 to 1/14/23 following a  mechanical  fall with left 8th rib fracture and evaluated for shortness of breath . He was evaluated by pulmonology, treated for likely community acquired pneumonia .  He was discharged on 1/14/23 with PO Ceftin and pain control for rib fracture  with prn oxycodone , flexeril and lidocaine patch.     He was brought back to ED on 1/15/23 with altered mental status and a fall. He was noted to be significantly hypoxic, obtunded and hypotensive to SBP 80s. On admission noted worsened cr 1.22; lactic acid 7.6; BNP 2374, pro calcitonin 1.43; normal troponin, Blood glucose 66.  It is unclear what caused this rapid return to hospital. Question opiate overdose as he responded narcan, but wife reports she counted pills and there were none missing. Possible he had aspiration.      He follows up with Dr. Dumont and Pulmonology for probable lung cancer with possible radiation pneumonitis. He has been noted with RUL mass like lesion (PET positive). He underwent VATS biopsy (02/2022) by Dr Dumont but was noted to be incomplete due to a lot of scar tissue. He subsequently underwent empiric radiation therapy for enlarging right upper lobe density.      He was also recently hospitalized in 10/2022 following a fall, noted with pneumonia and/or radiation pneumonitis. Received a prolonged steroid taper at that time. In early December, 2022 he was treated empirically with Ceftin for 7 days (for CT showing scattered micro nodules with concern for possible infection). Several workup for Histo, Blasto, Legionella, Strep and Aspergillus were negative. Last seen in  pulmonology clinic on 12/30/2022 at which time he had completed the prolonged steroid taper and had been off Oxygen since 12/18/22.     Septic shock, resolved  Healthcare associated pneumonia vs aspiration PNA  Acute on chronic hypoxic respiratory failure  lactic acidosis  metabolic encephalopathy, unclear etiology, likely opioid induced  recent admission for Closed fracture of L 8th rib s/p mechanical fall   Severe COPD/ emphysema  -  Has significant pulmonary history including severe COPD, right upper lobe density/mass, h/o radiation pneumonitis and recent hospitalizations as noted (as summarized above, also discussed below)  - as noted above, was hospitalized from 1/9 to 1/14/23 following a  mechanical  fall with left 8th rib fracture and SOB; was evaluated by pulmonology, and given recent worsening in the RUL, suspected likely infectious process; was treated for likely community acquired pneumonia with 4 days of Rocephin and was d/zabrina on PO Ceftin and pain control for rib fracture  with prn oxycodone , flexeril and lidocaine patch  - brought back on 1/15 with AMS and a fall; was noted significantly hypoxic, obtunded and hypotensive to SBP 80s. On admission noted worsened cr 1.22; lactic acid 7.6--> 4.2; BNP 2374, pro calcitonin 1.43; normal troponin, Blood glucose 66     - Unclear for his acute decompensation following discharge, but also he has significantly improved including mentation with supportive care and Narcan drip, thus some concern for likely unintentional opioid overdose although wife reports there were no missing oxycodones.  -  on admission CT head showed no acute intracranial process  - repeat CT chest 1/15 showed no PE and grossly unchanged masslike consolidation in the inferior right upper lobe and nodular consolidation in the right lower lobe, unchanged indexed pulmonary nodules  - started on empiric vancomycin and Zosyn for likely healthcare associated pneumonia with sepsis  - was started on  BiPAP support;  hypotension responded to IV fluids/boluses     - weaned off of BiPAP to 2 L nasal cannula oxygen  - off Narcan drip, mentation has improved and stable  - no further episodes of hypotension; elevated BNP, worsened from last hospitalization; ECHO 1/16 UR with LVEF 65-70%, no regional wall motion abnormalities noted; will continue to hold PTA Lasix   - remains afebrile  - stop zosyn on 1/20  - continue lidocaine patch for pain control from rib fracture  - encourage incentive spirometry; wean oxygen as able; might need some home oxygen  - continue nebulizers Q4 PRN ; continue Trelegy Ellipta (patient to use his own supply from home, d/w Pharmacy)   - 1/20 improving, ambulating further with PT and walker. Discussed with patient and wife, they are concerned about having adequate abx coverage.  Agreed with 2 more days of augmentin PO.  Working on discharge plans with SW.  Possibly home with spouse and home cares in a couple days.     Diarrhea, improving  Mild hypokalemia  - having loose stools X 3 in 24 hrs; has been afebrile and no abdominal discomfort but given presentation with likely septic shock, worsening leukocytosis  - cdiff negative  - K 3.3-- will start potassium supplementation protocol  - probiotic  - add fiber  - prn simethicone, imodium     Abnormal CT chest with RUL density/mass dating back prior to 2/2022, h/o empiric radiation therapy  obstructive sleep apnea-- not using CPAP  H/o radiation pneumonitis  - follows at [facility] (last seen in clinic on 12/30/22) for his combination of severe COPD with emphysema and abnormal chest imaging with incomplete at attempted VATS lung biopsy and radiation for possible thyroid or lung cancer with possible radiation pneumonitis.  - was due to see Dr. Dumont 1/10 but unfortunately got admitted  - CT chest this admission as noted above  - evaluated by pulmonology during last hospitalization  "(signed off 1/12/23), suggested to continue antibiotics for likely CAP, repeat CT chest without contrast in 6 weeks     Urinary retention  likely acute renal failure  - renal function improved, creatinine1.22-----> 1.01;  - this admission noted difficulty voiding, needed intermittent straight cath with difficulty  - Mir catheter was placed (1/16/23) ; evaluated by urology-- suggested to continue Flomax 0.4 mg daily (started 1/16) and to have voiding trial in 1 week; to follow up with Urology     CAD with hx Cx stenting 1997  HTN  HLD  [amlodipine 5 mg daily, aspirin 81 mg daily, furosemide 20 mg daily, metoprolol XL 50 mg day, rosuvastatin 10 mg daily]  Plan:  - Continue PTA meds  - holding PTA Lasix for now given the need for fluid boluses due to hypertension on presentation; ECHO 1/16 UR with LVEF 65-70%, no regional wall motion abnormalities noted  - BP stable; resumed PTA Toprol-XL (1/17/23) with hold parameters     Metastatic papillary thyroid cancer  Hypothyroidism  S/p total thyroidectomy 8/2021 with 4/4 LN positive. Treated with radioactive iodine ablation 9/2021.   -  continue  levothyroxine 137 mcg daily      BRENNEN (obstructive sleep apnea)  - does not use CPAP; continue to follow with pulmonology     Rheumatoid arthritis (H)  Follows with rheumatology. Not currently on medications for this      Diet: Regular Diet Adult    DVT Prophylaxis: Pneumatic Compression Devices  Mir Catheter: PRESENT, indication: Retention  Lines: None     Cardiac Monitoring: None  Code Status: No CPR- Do NOT Intubate      Clinically Significant Risk Factors              # Hypoalbuminemia: Lowest albumin = 2.3 g/dL at 1/15/2023  3:22 AM, will monitor as appropriate   # Thrombocytopenia: Lowest platelets = 119 in last 2 days, will monitor for bleeding         # Overweight: Estimated body mass index is 28.67 kg/m  as calculated from the following:    Height as of 1/9/23: 1.626 m (5' 4\").    Weight as of this encounter: 75.8 kg (167 " lb).          Disposition Plan     Expected Discharge Date: 01/21/2023                  Scott Carbajal MD  Hospitalist Service  [facility]  Securely message with [employer] (more info)  Text page via [employer] Paging/Directory   ______________________________________________________________________    Interval History   Care assumed today.  Patient seen and examined early afternoon with spouse at bedside.  Patient is improving overall-doing better with therapy.  Still on 1 L nasal cannula.  No new fevers or chills.  No new pain.      Physical Exam   Vital Signs: Temp: 97.6  F (36.4  C) Temp src: Oral BP: 138/74 Pulse: 79   Resp: 19 SpO2: 96 % O2 Device: Nasal cannula Oxygen Delivery: 1 LPM  Weight: 167 lbs 0 oz    Gen: NAD, pleasant, wife at bedside  HEENT: EOMI, MMM  Resp: No focal crackles, no wheezes, no increased work of resp, in general decreased breath sounds bilaterally  CV: S1S2 heard, reg rhythm, reg rate  Abdo: soft, nontender, nondistended, bowel sounds present  Ext: calves nontender, well perfused  Neuro: aa, conversing unremarkably, moving all extremities, CN grossly intact, no facial asymmetry        Medical Decision Making       51 MINUTES SPENT BY ME on the date of service doing chart review, history, exam, documentation & further activities per the note.      Data     I have personally reviewed the following data over the past 24 hrs:    12.6 (H)  \   11.8 (L)   / 141 (L)     141 102 3.4 (L) /  86   3.3 (L) 30 (H) 0.74 \       Imaging results reviewed over the past 24 hrs:   No results found for this or any previous visit (from the past 24 hour(s)).

## 2023-01-20 NOTE — PROGRESS NOTES
Care Management Follow Up    Length of Stay (days): 5    Expected Discharge Date: 01/21/2023     Concerns to be Addressed:   discharge plan    Patient plan of care discussed at interdisciplinary rounds: Yes    Anticipated Discharge Disposition:  TCU? PT recommends and wife is requesting.     Anticipated Discharge Services:    Anticipated Discharge DME:      Patient/family educated on Medicare website which has current facility and service quality ratings:    Education Provided on the Discharge Plan:    Patient/Family in Agreement with the Plan:      Referrals Placed by CM/SW:    Private pay costs discussed:     Additional Information:  Met with patient, wife. Spoke with patient and his wife.  Based on patient/wife and chart note input patient is doing much better each day.  Original recommendation by PT was TCU which wife supported however today the patient and wife are comfortable with planned discharge for Sunday. Spoke with Dr Carbajal who will have the martinez catheter removed tomorrow for a voiding trial. Wife understands if patient fails the voiding trial, he will be discharged with a martinez.  Assured her that bedside RN will provide education prior to discharge and the home RN can provide education also.  Patient has oxygen at home thru  Medical and was open to [facility] for RN and PT which they would like continued.  They do not want OT. They report patient has his bath area equiped and he has a walker.  Patient will discharge home with resumption of home RN and PT thru [facility] .[facility] liaison alerted to planned Sunday discharge.  Explained to wife, the hospital prefers discharge before 1100.  Plan: Care Coordination RN/SW staff will follow to finalize plans.  JOMAR Gonzalez

## 2023-01-21 LAB — POTASSIUM SERPL-SCNC: 3.3 MMOL/L (ref 3.4–5.3)

## 2023-01-21 PROCEDURE — 250N000013 HC RX MED GY IP 250 OP 250 PS 637: Performed by: HOSPITALIST

## 2023-01-21 PROCEDURE — 99233 SBSQ HOSP IP/OBS HIGH 50: CPT | Performed by: HOSPITALIST

## 2023-01-21 PROCEDURE — 250N000013 HC RX MED GY IP 250 OP 250 PS 637: Performed by: STUDENT IN AN ORGANIZED HEALTH CARE EDUCATION/TRAINING PROGRAM

## 2023-01-21 PROCEDURE — 120N000001 HC R&B MED SURG/OB

## 2023-01-21 PROCEDURE — 84132 ASSAY OF SERUM POTASSIUM: CPT | Performed by: HOSPITALIST

## 2023-01-21 PROCEDURE — 36415 COLL VENOUS BLD VENIPUNCTURE: CPT | Performed by: HOSPITALIST

## 2023-01-21 RX ORDER — POTASSIUM CHLORIDE 1.5 G/1.58G
20 POWDER, FOR SOLUTION ORAL 2 TIMES DAILY
Status: DISCONTINUED | OUTPATIENT
Start: 2023-01-22 | End: 2023-01-22 | Stop reason: HOSPADM

## 2023-01-21 RX ORDER — POTASSIUM CHLORIDE 1.5 G/1.58G
40 POWDER, FOR SOLUTION ORAL ONCE
Status: COMPLETED | OUTPATIENT
Start: 2023-01-21 | End: 2023-01-21

## 2023-01-21 RX ADMIN — Medication 1 CAPSULE: at 09:04

## 2023-01-21 RX ADMIN — AMLODIPINE BESYLATE 5 MG: 5 TABLET ORAL at 09:04

## 2023-01-21 RX ADMIN — ROSUVASTATIN CALCIUM 10 MG: 10 TABLET, FILM COATED ORAL at 09:04

## 2023-01-21 RX ADMIN — DULOXETINE HYDROCHLORIDE 20 MG: 20 CAPSULE, DELAYED RELEASE ORAL at 20:22

## 2023-01-21 RX ADMIN — Medication 3 CAPSULE: at 21:51

## 2023-01-21 RX ADMIN — LOPERAMIDE HYDROCHLORIDE 2 MG: 2 CAPSULE ORAL at 09:03

## 2023-01-21 RX ADMIN — FUROSEMIDE 20 MG: 20 TABLET ORAL at 09:04

## 2023-01-21 RX ADMIN — ASPIRIN 81 MG: 81 TABLET, COATED ORAL at 09:04

## 2023-01-21 RX ADMIN — Medication 3 CAPSULE: at 09:04

## 2023-01-21 RX ADMIN — TAMSULOSIN HYDROCHLORIDE 0.4 MG: 0.4 CAPSULE ORAL at 21:51

## 2023-01-21 RX ADMIN — MULTIPLE VITAMINS W/ MINERALS TAB 1 TABLET: TAB at 12:22

## 2023-01-21 RX ADMIN — METOPROLOL SUCCINATE 50 MG: 50 TABLET, EXTENDED RELEASE ORAL at 09:04

## 2023-01-21 RX ADMIN — DULOXETINE HYDROCHLORIDE 20 MG: 20 CAPSULE, DELAYED RELEASE ORAL at 09:04

## 2023-01-21 RX ADMIN — LEVOTHYROXINE SODIUM 137 MCG: 112 TABLET ORAL at 09:04

## 2023-01-21 RX ADMIN — AMOXICILLIN AND CLAVULANATE POTASSIUM 1 TABLET: 875; 125 TABLET, FILM COATED ORAL at 20:22

## 2023-01-21 RX ADMIN — LOPERAMIDE HYDROCHLORIDE 2 MG: 2 CAPSULE ORAL at 15:49

## 2023-01-21 RX ADMIN — AMOXICILLIN AND CLAVULANATE POTASSIUM 1 TABLET: 875; 125 TABLET, FILM COATED ORAL at 09:04

## 2023-01-21 RX ADMIN — POTASSIUM CHLORIDE 40 MEQ: 1.5 POWDER, FOR SOLUTION ORAL at 12:22

## 2023-01-21 RX ADMIN — Medication 1 CAPSULE: at 20:22

## 2023-01-21 RX ADMIN — FUROSEMIDE 20 MG: 20 TABLET ORAL at 12:22

## 2023-01-21 RX ADMIN — Medication 3 CAPSULE: at 15:49

## 2023-01-21 ASSESSMENT — ACTIVITIES OF DAILY LIVING (ADL)
ADLS_ACUITY_SCORE: 25
ADLS_ACUITY_SCORE: 23
ADLS_ACUITY_SCORE: 23
ADLS_ACUITY_SCORE: 25
ADLS_ACUITY_SCORE: 23

## 2023-01-21 NOTE — PLAN OF CARE
A&OX4, 1 L NC,  VSS, denies pain, declined tylenol. up independently. Mir in place, on regular diet. Rested intermittent, declined pulse oximetry overnight

## 2023-01-21 NOTE — PROGRESS NOTES
[facility]    Medicine Progress Note - Hospitalist Service    Date of Admission:  1/15/2023    Assessment & Plan   Harrison Thomas is a 77 year old male with a complex PMH including thyroid CA (s/p thyroidectomy), severe COPD, CAD, HTN, DLD, RA, BRENNEN, RUL lung mass (probable malignancy) who was recently admitted from 1/9 to 1/14/23 following a  mechanical  fall with left 8th rib fracture and evaluated for shortness of breath . He was evaluated by pulmonology, treated for likely community acquired pneumonia .  He was discharged on 1/14/23 with PO Ceftin and pain control for rib fracture  with prn oxycodone , flexeril and lidocaine patch.     He was brought back to ED on 1/15/23 with altered mental status and a fall. He was noted to be significantly hypoxic, obtunded and hypotensive to SBP 80s. On admission noted worsened cr 1.22; lactic acid 7.6; BNP 2374, pro calcitonin 1.43; normal troponin, Blood glucose 66.  It is unclear what caused this rapid return to hospital. Question opiate overdose as he responded narcan, but wife reports she counted pills and there were none missing. Possible he had aspiration.      He follows  with Dr. Dumont and Pulmonology for probable lung cancer with possible radiation pneumonitis. He has been noted with RUL mass like lesion (PET positive). He underwent VATS biopsy (02/2022) by Dr Dumont but was noted to be incomplete due to a lot of scar tissue. He subsequently underwent empiric radiation therapy for enlarging right upper lobe density.      He was also recently hospitalized in 10/2022 following a fall, noted with pneumonia and/or radiation pneumonitis. Received a prolonged steroid taper at that time. In early December, 2022 he was treated empirically with Ceftin for 7 days (for CT showing scattered micro nodules with concern for possible infection). Several workup for Histo, Blasto, Legionella, Strep and Aspergillus were negative. Last seen in  pulmonology clinic on 12/30/2022 at which time he had completed the prolonged steroid taper and had been off Oxygen since 12/18/22.     Septic shock, resolved  Healthcare associated pneumonia vs aspiration PNA  Acute on chronic hypoxic respiratory failure  lactic acidosis  metabolic encephalopathy, unclear etiology, likely opioid induced  recent admission for Closed fracture of L 8th rib s/p mechanical fall   Severe COPD/ emphysema  -  Has significant pulmonary history including severe COPD, right upper lobe density/mass, h/o radiation pneumonitis and recent hospitalizations as noted (as summarized above, also discussed below)  - as noted above, was hospitalized from 1/9 to 1/14/23 following a  mechanical  fall with left 8th rib fracture and SOB; was evaluated by pulmonology, and given recent worsening in the RUL, suspected likely infectious process; was treated for likely community acquired pneumonia with 4 days of Rocephin and was d/zabrina on PO Ceftin and pain control for rib fracture  with prn oxycodone , flexeril and lidocaine patch  - brought back on 1/15 with AMS and a fall; was noted significantly hypoxic, obtunded and hypotensive to SBP 80s. On admission noted worsened cr 1.22; lactic acid 7.6--> 4.2; BNP 2374, pro calcitonin 1.43; normal troponin, Blood glucose 66     - Unclear for his acute decompensation following discharge, but also he has significantly improved including mentation with supportive care and Narcan drip, thus some concern for likely unintentional opioid overdose although wife reports there were no missing oxycodones.  -  on admission CT head showed no acute intracranial process  - repeat CT chest 1/15 showed no PE and grossly unchanged masslike consolidation in the inferior right upper lobe and nodular consolidation in the right lower lobe, unchanged indexed pulmonary nodules  - started on empiric vancomycin and Zosyn for likely healthcare associated pneumonia with sepsis  - was started on  BiPAP support;  hypotension responded to IV fluids/boluses     - weaned off of BiPAP to 2 L nasal cannula oxygen  - off Narcan drip, mentation has improved and stable  - no further episodes of hypotension; elevated BNP, worsened from last hospitalization; ECHO 1/16 UR with LVEF 65-70%, no regional wall motion abnormalities noted; will continue to hold PTA Lasix   - remains afebrile  - stop zosyn on 1/20  - continue lidocaine patch for pain control from rib fracture  - encourage incentive spirometry; wean oxygen as able; might need some home oxygen  - continue nebulizers Q4 PRN ; continue Trelegy Ellipta (patient to use his own supply from home, d/w Pharmacy)   - 1/20 improving, ambulating further with PT and walker. Discussed with patient and wife, they are concerned about having adequate abx coverage.  Agreed with 2 more days of augmentin PO.  Working on discharge plans with SW.   continued improvement, down to 1 L nasal cannula and independent in the room.  We will plan for home discharge 1/22 with home health care arrangement.     Diarrhea, improving  Mild hypokalemia  - having loose stools X 3 in 24 hrs; has been afebrile and no abdominal discomfort but given presentation with likely septic shock, worsening leukocytosis  - cdiff negative  - K 3.3-- will start potassium supplementation protocol -we will schedule 20 meq KCl daily.  Can stop protocol.  BMP follow-up in 3 days with home health RN.  - probiotic  - add fiber  - prn simethicone, imodium     Abnormal CT chest with RUL density/mass dating back prior to 2/2022, h/o empiric radiation therapy  obstructive sleep apnea-- not using CPAP  H/o radiation pneumonitis  - follows at [facility] (last seen in clinic on 12/30/22) for his combination of severe COPD with emphysema and abnormal chest imaging with incomplete at attempted VATS lung biopsy and radiation for possible thyroid or lung cancer with  possible radiation pneumonitis.  - was due to see Dr. Dumont 1/10 but unfortunately got admitted  - CT chest this admission as noted above  - evaluated by pulmonology during last hospitalization (signed off 1/12/23), suggested to continue antibiotics for likely CAP, repeat CT chest without contrast in 6 weeks     Urinary retention  Likely acute renal failure  - renal function improved, creatinine 1.22-----> 1.01;  - this admission noted difficulty voiding, needed intermittent straight cath with difficulty  - Mir catheter was placed (1/16/23) ; evaluated by urology-- suggested to continue Flomax 0.4 mg daily (started 1/16) and to have voiding trial in 1 week; to follow up with Urology  -1/21-reviewed urology note from several days ago with essentially plan to do a trial of void either yesterday or in 2 days.  With plans for home discharge tomorrow patient in agreement with a trial of voiding today which has been successful so far as of late afternoon.    - We will continue Flomax and plan for urology clinic follow-up.     CAD with hx Cx stenting 1997  HTN  HLD  [amlodipine 5 mg daily, aspirin 81 mg daily, furosemide 20 mg daily, metoprolol XL 50 mg day, rosuvastatin 10 mg daily]  Plan:  - Continue PTA meds  - holding PTA Lasix for now given the need for fluid boluses due to hypertension on presentation; ECHO 1/16 UR with LVEF 65-70%, no regional wall motion abnormalities noted  - BP stable; resumed PTA Toprol-XL (1/17/23) with hold parameters     Metastatic papillary thyroid cancer  Hypothyroidism  S/p total thyroidectomy 8/2021 with 4/4 LN positive. Treated with radioactive iodine ablation 9/2021.   -  continue  levothyroxine 137 mcg daily      BRENNEN (obstructive sleep apnea)  - does not use CPAP; continue to follow with pulmonology     Rheumatoid arthritis (H)  Follows with rheumatology. Not currently on medications for this         Diet: Regular Diet Adult    DVT Prophylaxis: Pneumatic Compression Devices and  "Ambulate every shift  Mir Catheter: PRESENT, indication: Retention  Lines: None     Cardiac Monitoring: None  Code Status: No CPR- Do NOT Intubate      Clinically Significant Risk Factors        # Hypokalemia: Lowest K = 3.3 mmol/L in last 2 days, will replace as needed       # Hypoalbuminemia: Lowest albumin = 2.3 g/dL at 1/15/2023  3:22 AM, will monitor as appropriate           # Overweight: Estimated body mass index is 28.67 kg/m  as calculated from the following:    Height as of 1/9/23: 1.626 m (5' 4\").    Weight as of this encounter: 75.8 kg (167 lb).          Disposition Plan      Expected Discharge Date: 01/22/2023        Discharge Comments: trial of voiding 1/21          Scott Carbajal MD  Hospitalist Service  [facility]  Securely message with [employer] (more info)  Text page via [employer] Paging/Directory   ______________________________________________________________________    Interval History   Patient seen and examined early afternoon.  No new complaints or issues.  Continues to improve with ambulation.  Breathing stable/improved and down to 1 L nasal cannula.  Hoping to discharge home tomorrow with family and home cares.  Trial of voiding today successful.  Patient denies any fevers or chills, no new coughing, no pain.    Physical Exam   Vital Signs: Temp: 97.9  F (36.6  C) Temp src: Oral BP: (!) 148/80 Pulse: 72   Resp: 17 SpO2: 93 % O2 Device: Nasal cannula Oxygen Delivery: 1 LPM  Weight: 167 lbs 0 oz    Gen: NAD, pleasant  HEENT: EOMI, MMM  Resp: No focal crackles, no wheezes, no increased work of resp  CV: S1S2 heard, regular rhythm, regular rate  Abdo: soft, nontender, nondistended, bowel sounds present  Ext: calves nontender, well perfused  Neuro: Awake and alert, conversant, moving extremities, CN grossly intact, no facial asymmetry      Medical Decision Making       51 MINUTES SPENT BY ME on the date of service doing chart review, history, exam, documentation & further " activities per the note.      Data     I have personally reviewed the following data over the past 24 hrs:    N/A  \   N/A   / N/A     N/A N/A N/A /  N/A   3.3 (L) N/A N/A \       Imaging results reviewed over the past 24 hrs:   No results found for this or any previous visit (from the past 24 hour(s)).

## 2023-01-22 VITALS
DIASTOLIC BLOOD PRESSURE: 64 MMHG | TEMPERATURE: 98.3 F | WEIGHT: 167 LBS | SYSTOLIC BLOOD PRESSURE: 131 MMHG | BODY MASS INDEX: 28.67 KG/M2 | HEART RATE: 70 BPM | OXYGEN SATURATION: 95 % | RESPIRATION RATE: 18 BRPM

## 2023-01-22 PROBLEM — E87.6 HYPOKALEMIA: Status: ACTIVE | Noted: 2023-01-22

## 2023-01-22 PROCEDURE — 250N000013 HC RX MED GY IP 250 OP 250 PS 637: Performed by: HOSPITALIST

## 2023-01-22 PROCEDURE — 250N000013 HC RX MED GY IP 250 OP 250 PS 637: Performed by: STUDENT IN AN ORGANIZED HEALTH CARE EDUCATION/TRAINING PROGRAM

## 2023-01-22 PROCEDURE — 99239 HOSP IP/OBS DSCHRG MGMT >30: CPT | Performed by: HOSPITALIST

## 2023-01-22 RX ORDER — POTASSIUM CHLORIDE 1.5 G/1.58G
20 POWDER, FOR SOLUTION ORAL 2 TIMES DAILY
Qty: 60 PACKET | Refills: 0 | Status: SHIPPED | OUTPATIENT
Start: 2023-01-22 | End: 2023-01-23

## 2023-01-22 RX ADMIN — LOPERAMIDE HYDROCHLORIDE 2 MG: 2 CAPSULE ORAL at 11:22

## 2023-01-22 RX ADMIN — LEVOTHYROXINE SODIUM 137 MCG: 112 TABLET ORAL at 08:48

## 2023-01-22 RX ADMIN — POTASSIUM CHLORIDE 20 MEQ: 1.5 POWDER, FOR SOLUTION ORAL at 08:47

## 2023-01-22 RX ADMIN — FUROSEMIDE 20 MG: 20 TABLET ORAL at 08:48

## 2023-01-22 RX ADMIN — LOPERAMIDE HYDROCHLORIDE 2 MG: 2 CAPSULE ORAL at 08:52

## 2023-01-22 RX ADMIN — Medication 3 CAPSULE: at 08:48

## 2023-01-22 RX ADMIN — AMLODIPINE BESYLATE 5 MG: 5 TABLET ORAL at 08:47

## 2023-01-22 RX ADMIN — ASPIRIN 81 MG: 81 TABLET, COATED ORAL at 08:47

## 2023-01-22 RX ADMIN — ROSUVASTATIN CALCIUM 10 MG: 10 TABLET, FILM COATED ORAL at 08:48

## 2023-01-22 RX ADMIN — AMOXICILLIN AND CLAVULANATE POTASSIUM 1 TABLET: 875; 125 TABLET, FILM COATED ORAL at 08:48

## 2023-01-22 RX ADMIN — DULOXETINE HYDROCHLORIDE 20 MG: 20 CAPSULE, DELAYED RELEASE ORAL at 08:48

## 2023-01-22 RX ADMIN — METOPROLOL SUCCINATE 50 MG: 50 TABLET, EXTENDED RELEASE ORAL at 08:48

## 2023-01-22 RX ADMIN — Medication 1 CAPSULE: at 08:47

## 2023-01-22 ASSESSMENT — ACTIVITIES OF DAILY LIVING (ADL)
ADLS_ACUITY_SCORE: 23

## 2023-01-22 ASSESSMENT — PATIENT HEALTH QUESTIONNAIRE - PHQ9
SUM OF ALL RESPONSES TO PHQ QUESTIONS 1-9: 24
10. IF YOU CHECKED OFF ANY PROBLEMS, HOW DIFFICULT HAVE THESE PROBLEMS MADE IT FOR YOU TO DO YOUR WORK, TAKE CARE OF THINGS AT HOME, OR GET ALONG WITH OTHER PEOPLE: VERY DIFFICULT
SUM OF ALL RESPONSES TO PHQ QUESTIONS 1-9: 24

## 2023-01-22 NOTE — PLAN OF CARE
DATE & TIME: 1/21/2023 [id number]  Cognitive Concerns/ Orientation : Alert and oriented x 4   BEHAVIOR & AGGRESSION TOOL: Green   ABNL VS/O2: VSS on 1L of O2, takes on and off per self.  MOBILITY: independent with walker  PAIN MANAGMENT: Denies pain. Refused schedule Tylenol x2 and lidocane patch.  DIET: Regular.   BOWEL/BLADDER: voiding adequately in urinal.    ABNL LAB/BG:  K+3.3, protocol done -no recheck per MD. Will start scheduled K+ tomorrow.  DRAIN/DEVICES: Peripheral IV SL right arm.   SKIN: Dusky/fragile, scattered bruising, blotchy legs- edematous; Coccyx pink blanchable.  Bilateral ears tender behind earlobes from oxygen tubing, foam placed on oxygen tubing, patient states that helps.  Large area of bruising behind right leg, pt states it is from the catheter tubing, denied pain.  TESTS/PROCEDURES: None scheduled.   D/C DAY/GOALS/PLACE: PT recommends home with wife, Discharge planned for 1/22 around noon.  OTHER IMPORTANT INFO: ACEVES. LS diminished. Patient on po Lasix BID.Jone ankle edema.

## 2023-01-22 NOTE — DISCHARGE SUMMARY
[facility]  Hospitalist Discharge Summary      Date of Admission:  1/15/2023  Date of Discharge:  1/22/2023 11:54 AM  Discharging Provider: Scott Carbajal MD  Discharge Service: Hospitalist Service    Discharge Diagnoses   Septic shock, resolved  Healthcare associated pneumonia vs aspiration PNA  Acute on chronic hypoxic respiratory failure  lactic acidosis  metabolic encephalopathy, unclear etiology, likely opioid induced  recent admission for Closed fracture of L 8th rib s/p mechanical fall   Severe COPD/ emphysema  Diarrhea, improving  Mild hypokalemia  Abnormal CT chest with RUL density/mass dating back prior to 2/2022, h/o empiric radiation therapy  obstructive sleep apnea-- not using CPAP  H/o radiation pneumonitis  Urinary retention - resolved  Acute renal failure  CAD with hx Cx stenting 1997  HTN  HLD  Metastatic papillary thyroid cancer  Hypothyroidism  BRENNEN  RA      Follow-ups Needed After Discharge   Follow-up Appointments     Follow-up and recommended labs and tests       Follow up with PCP with BMP this week, primary outpatient team, urology   clinic, Dr Dumont with CT Chest without contrast in 6 weeks, and   outpatient pulmonary team.             Unresulted Labs Ordered in the Past 30 Days of this Admission     No orders found from 12/16/2022 to 1/16/2023.      These results will be followed up by NA    Discharge Disposition   Discharged to home with spouse and home health care resumed  Condition at discharge: Stable      Hospital Course   Harrison Thomas is a 77 year old male with a complex PMH including thyroid CA (s/p thyroidectomy), severe COPD, CAD, HTN, DLD, RA, BRENNEN, RUL lung mass (probable malignancy) who was recently admitted from 1/9 to 1/14/23 following a  mechanical  fall with left 8th rib fracture and evaluated for shortness of breath . He was evaluated by pulmonology, treated for likely community acquired pneumonia .  He was discharged on 1/14/23 with PO  Ceftin and pain control for rib fracture  with prn oxycodone , flexeril and lidocaine patch.     He was brought back to ED on 1/15/23 with altered mental status and a fall. He was noted to be significantly hypoxic, obtunded and hypotensive to SBP 80s. On admission noted worsened cr 1.22; lactic acid 7.6; BNP 2374, pro calcitonin 1.43; normal troponin, Blood glucose 66.  It is unclear what caused this rapid return to hospital. Question opiate overdose as he responded narcan, but wife reports she counted pills and there were none missing. Possible he had aspiration.      He follows  with Dr. Dumont and Pulmonology for probable lung cancer with possible radiation pneumonitis. He has been noted with RUL mass like lesion (PET positive). He underwent VATS biopsy (02/2022) by Dr Dumont but was noted to be incomplete due to a lot of scar tissue. He subsequently underwent empiric radiation therapy for enlarging right upper lobe density.      He was also recently hospitalized in 10/2022 following a fall, noted with pneumonia and/or radiation pneumonitis. Received a prolonged steroid taper at that time. In early December, 2022 he was treated empirically with Ceftin for 7 days (for CT showing scattered micro nodules with concern for possible infection). Several workup for Histo, Blasto, Legionella, Strep and Aspergillus were negative. Last seen in pulmonology clinic on 12/30/2022 at which time he had completed the prolonged steroid taper and had been off Oxygen since 12/18/22.     Septic shock, resolved  Healthcare associated pneumonia vs aspiration PNA  Acute on chronic hypoxic respiratory failure  lactic acidosis  metabolic encephalopathy, unclear etiology, likely opioid induced  recent admission for Closed fracture of L 8th rib s/p mechanical fall   Severe COPD/ emphysema  -  Has significant pulmonary history including severe COPD, right upper lobe density/mass, h/o radiation pneumonitis and recent hospitalizations as  noted (as summarized above, also discussed below)  - as noted above, was hospitalized from 1/9 to 1/14/23 following a  mechanical  fall with left 8th rib fracture and SOB; was evaluated by pulmonology, and given recent worsening in the RUL, suspected likely infectious process; was treated for likely community acquired pneumonia with 4 days of Rocephin and was d/zabrina on PO Ceftin and pain control for rib fracture  with prn oxycodone , flexeril and lidocaine patch  - brought back on 1/15 with AMS and a fall; was noted significantly hypoxic, obtunded and hypotensive to SBP 80s. On admission noted worsened cr 1.22; lactic acid 7.6--> 4.2; BNP 2374, pro calcitonin 1.43; normal troponin, Blood glucose 66     - Unclear for his acute decompensation following discharge, but also he has significantly improved including mentation with supportive care and Narcan drip, thus some concern for likely unintentional opioid overdose although wife reports there were no missing oxycodones.  -  on admission CT head showed no acute intracranial process  - repeat CT chest 1/15 showed no PE and grossly unchanged masslike consolidation in the inferior right upper lobe and nodular consolidation in the right lower lobe, unchanged indexed pulmonary nodules  - started on empiric vancomycin and Zosyn for likely healthcare associated pneumonia with sepsis  - was started on BiPAP support;  hypotension responded to IV fluids/boluses     - weaned off of BiPAP to 2 L nasal cannula oxygen  - off Narcan drip, mentation has improved and stable  - no further episodes of hypotension; elevated BNP, worsened from last hospitalization; ECHO 1/16 UR with LVEF 65-70%, no regional wall motion abnormalities noted; will continue to hold PTA Lasix   - remains afebrile  - stop zosyn on 1/20  - continue lidocaine patch for pain control from rib fracture  - encourage incentive spirometry; wean oxygen as able; might need some home oxygen  - continue nebulizers Q4 PRN ;  continue Trelegy Ellipta (patient to use his own supply from home, d/w Pharmacy)   - 1/20 improving, ambulating further with PT and walker. Discussed with patient and wife, they are concerned about having adequate abx coverage.  Agreed with 2 more days of augmentin PO.  Working on discharge plans with SW.   continued improvement, down to 1 L nasal cannula and independent in the room.  We will plan for home discharge 1/22 with home health care arrangement.  - 1/22 doing well, 1L nc continued, has PCP and Thoracic surgery f/u already scheduled for tmrw.  Home health resumed. Spouse assist at home. No further abx at discharge has had substantial regimen in hospital. He is planning on probiotic to continue at home.     Diarrhea, improving  Mild hypokalemia  - having loose stools X 3 in 24 hrs; has been afebrile and no abdominal discomfort but given presentation with likely septic shock, worsening leukocytosis  - cdiff negative  - K 3.3-- will start potassium supplementation protocol -we will schedule 20 meq KCl daily.  Can stop protocol.  BMP follow-up in 3 days with home health RN or alternatively he is seeing PCP tmrw - further planning from there as indicated.  - probiotic  - add fiber  - prn simethicone, imodium     Abnormal CT chest with RUL density/mass dating back prior to 2/2022, h/o empiric radiation therapy  obstructive sleep apnea-- not using CPAP  H/o radiation pneumonitis  - follows at [facility] (last seen in clinic on 12/30/22) for his combination of severe COPD with emphysema and abnormal chest imaging with incomplete at attempted VATS lung biopsy and radiation for possible thyroid or lung cancer with possible radiation pneumonitis.  - was due to see Dr. Dumont 1/10 but unfortunately got admitted  - CT chest this admission as noted above  - evaluated by pulmonology during last hospitalization (signed off 1/12/23), suggested to continue  antibiotics for likely CAP, repeat CT chest without contrast in 6 weeks     Urinary retention  Likely acute renal failure  - renal function improved, creatinine 1.22-----> 1.01;  - this admission noted difficulty voiding, needed intermittent straight cath with difficulty  - Mir catheter was placed (1/16/23) ; evaluated by urology-- suggested to continue Flomax 0.4 mg daily (started 1/16) and to have voiding trial in 1 week; to follow up with Urology  -1/21-reviewed urology note from several days ago with essentially plan to do a trial of void either yesterday or in 2 days.  With plans for home discharge tomorrow patient in agreement with a trial of voiding today which has been successful so far as of late afternoon.    - We will continue Flomax and plan for urology clinic follow-up.     CAD with hx Cx stenting 1997  HTN  HLD  [amlodipine 5 mg daily, aspirin 81 mg daily, furosemide 20 mg daily, metoprolol XL 50 mg day, rosuvastatin 10 mg daily]  Plan:  - Continue PTA meds  - holding PTA Lasix for now given the need for fluid boluses due to hypertension on presentation; ECHO 1/16 UR with LVEF 65-70%, no regional wall motion abnormalities noted  - BP stable; resumed PTA Toprol-XL (1/17/23) with hold parameters     Metastatic papillary thyroid cancer  Hypothyroidism  S/p total thyroidectomy 8/2021 with 4/4 LN positive. Treated with radioactive iodine ablation 9/2021.   -  continue  levothyroxine 137 mcg daily      BRENNEN (obstructive sleep apnea)  - does not use CPAP; continue to follow with pulmonology     Rheumatoid arthritis (H)  Follows with rheumatology. Not currently on medications for this       Consultations This Hospital Stay   PHARMACY TO DOSE VANCO  PHARMACY TO DOSE VANCO  PHYSICAL THERAPY ADULT IP CONSULT  OCCUPATIONAL THERAPY ADULT IP CONSULT  UROLOGY IP CONSULT  CARE MANAGEMENT / SOCIAL WORK IP CONSULT    Code Status   Prior    Time Spent on this Encounter   I, Scott Carbajal MD, personally saw the  patient today and spent greater than 30 minutes discharging this patient.       Scott Carbajal MD  [place] [address] [facility]  [address] [place] [address]  Phone: [phone]  ______________________________________________________________________    Physical Exam   Vital Signs: Temp: 98.3  F (36.8  C) Temp src: Oral BP: 131/64 Pulse: 70   Resp: 18 SpO2: 95 % O2 Device: Nasal cannula Oxygen Delivery: 1 LPM  Weight: 167 lbs 0 oz  N  Gen: NAD, pleasant  HEENT: EOMI, MMM  Resp: no crackles,  no wheezes, no increased work of resp  CV: S1S2 heard, reg rhythm, reg rate  Abdo: soft, nontender, nondistended, bowel sounds present  Ext: calves nontender, well perfused  Neuro: aa, conversant, moving all ext, CN grossly intact, no facial asymmetry         Primary Care Physician   Yaneli Dozier    Discharge Orders      Medication Therapy Management Referral      Reason for your hospital stay    Shortness of breath     Activity    Your activity upon discharge: activity as tolerated     Resume Home Care Services    Please resume home care PT and RN     Discharge Instructions    Continue as needed home oxygen supplementation to maintain O2 sat 90% or higher     Follow-up and recommended labs and tests     Follow up with PCP with BMP this week, primary outpatient team, urology clinic, Dr Dumont with CT Chest without contrast in 6 weeks, and outpatient pulmonary team.     Diet    Follow this diet upon discharge: Orders Placed This Encounter      Regular Diet Adult       Significant Results and Procedures   Most Recent 3 CBC's:Recent Labs   Lab Test 01/20/23  1024 01/19/23  1000 01/18/23  0833   WBC 12.6* 12.9* 15.6*   HGB 11.8* 11.1* 11.3*   MCV 89 88 88   * 119* 108*     Most Recent 3 BMP's:Recent Labs   Lab Test 01/21/23  1102 01/20/23  1024 01/19/23  2104 01/19/23  1000 01/18/23  1340 01/18/23  0946 01/18/23  0832 01/17/23  1108 01/17/23  0726   NA  --  141  --   --   --   --  139   --  144   POTASSIUM 3.3* 3.3*  --  3.5  --   --  3.7   < > 3.3*   CHLORIDE  --  102  --   --   --   --  104  --  112*   CO2  --  30*  --   --   --   --  21*  --  23   BUN  --  3.4*  --   --   --   --  6.1*  --  11   CR  --  0.74  --   --   --   --  0.69  --  0.75   ANIONGAP  --  9  --   --   --   --  14  --  9   KARLIE  --  8.3*  --   --   --   --  7.9*  --  7.6*   GLC  --  86 130*  --  88   < > 56*   < > 70    < > = values in this interval not displayed.     Most Recent 3 Troponin's:Recent Labs   Lab Test 02/24/19  2119   TROPI <0.015     Most Recent 3 BNP's:Recent Labs   Lab Test 01/15/23  0322 01/09/23  1235 10/15/22  0300   NTBNPI 2,374* 820 386   ,   Results for orders placed or performed during the hospital encounter of 01/15/23   XR Chest Port 1 View    Narrative    EXAM: XR CHEST PORT 1 VIEW  LOCATION: [facility]  DATE/TIME: 1/15/2023 3:30 AM    INDICATION: dyspnea  COMPARISON: Chest CT 01/09/2023      Impression    IMPRESSION: Reidentification of right upper lobe consolidation though likely improved as compared to the prior CT from 01/9/23. Possible small right pleural effusion. Left lung is clear. Stable heart size. Atherosclerotic calcifications of the aortic   arch. No acute osseous abnormality.   CT Chest Pulmonary Embolism w Contrast    Narrative    EXAM: CT CHEST PULMONARY EMBOLISM W CONTRAST  LOCATION: [facility]  DATE/TIME: 1/15/2023 4:02 AM    INDICATION: acute on chronic hypoxic resp. failure  COMPARISON: CT chest without contrast 01/09/2023  TECHNIQUE: CT chest pulmonary angiogram during arterial phase injection of IV contrast. Multiplanar reformats and MIP reconstructions were performed. Dose reduction techniques were used.   CONTRAST: 63  ml Isovue 370    FINDINGS:  ANGIOGRAM CHEST: Pulmonary arteries are normal caliber and negative for pulmonary emboli. Thoracic aorta is negative for dissection. No CT evidence of right heart strain.    LUNGS AND  PLEURA: Moderate upper lobe predominant centrilobular and paraseptal emphysema. Grossly unchanged masslike consolidation in the inferior aspect of the right upper lobe. Similar nodular consolidation in the right lower lobe. ?Scattered pulmonary   nodules are again noted with index left upper lobe nodule measuring 0.5 cm, unchanged (series 9 image 28) and left lower lobe nodule measuring 0.6 cm (series 9 image 196), previously 0.5 cm.    MEDIASTINUM/AXILLAE: Overall slightly decreased size of multiple mediastinal lymph nodes with right paratracheal node measuring 1.8 cm cm in short axis (series 7 image 97), previously 2.2 cm. No thoracic aortic aneurysm.    CORONARY ARTERY CALCIFICATION: Previous intervention (stents).    UPPER ABDOMEN: Right renal cyst. Atherosclerotic calcifications of the upper abdominal aorta.    MUSCULOSKELETAL: Subacute, minimally-displaced left lateral eighth rib fracture. Additional healed left rib fractures are unchanged.      Impression    IMPRESSION:  1.  No evidence of pulmonary embolus.  2.  Grossly unchanged masslike consolidation in the inferior right upper lobe and nodular consolidation in the right lower lobe. Recommend followup Chest CT to ensure resolution.  3.  Unchanged indexed pulmonary nodules.  4.  Overall slightly decreased size of multiple mediastinal lymph nodes, likely reactive.   Head CT w/o contrast    Narrative    EXAM: CT HEAD W/O CONTRAST  LOCATION: [facility]  DATE/TIME: 1/15/2023 4:02 AM    INDICATION: Traumatic injury  COMPARISON: None.  TECHNIQUE: Routine CT Head without IV contrast. Multiplanar reformats. Dose reduction techniques were used.    FINDINGS:  INTRACRANIAL CONTENTS: No intracranial hemorrhage, extraaxial collection, or mass effect.  No CT evidence of acute infarct. Mild presumed chronic small vessel ischemic changes. Mild to moderate generalized volume loss. No hydrocephalus.     VISUALIZED ORBITS/SINUSES/MASTOIDS: Prior  bilateral cataract surgery. Visualized portions of the orbits are otherwise unremarkable. Severe mucosal thickening scattered about the paranasal sinuses, with complete opacification of the ethmoid air cells and   near complete opacification of the frontal sinuses. No middle ear or mastoid effusion.    BONES/SOFT TISSUES: No acute abnormality.      Impression    IMPRESSION:  1.  No CT evidence for acute intracranial process.  2.  Brain atrophy and presumed chronic microvascular ischemic changes as above.  3.  Pansinusitis.   XR Chest Port 1 View    Narrative    EXAM: XR CHEST PORT 1 VIEW  LOCATION: [facility]  DATE/TIME: 1/15/2023 5:56 AM    INDICATION: Repeat drop BP, on PPV with rib fx, ? development of PTX vs pulmonary edema.    COMPARISON: CT pulmonary angiogram 1/15/2023.      Impression    IMPRESSION: The patient is rotated and tilted. Masslike infiltrate right upper lobe. Slight platelike atelectasis in the lower lungs. No pleural effusion on either side. Normal cardiac size and pulmonary vascularity. Degenerative changes both shoulders   and the spine. Monitoring leads overlying the chest. Left convex thoracic curve.   Echocardiogram Complete     Value    LVEF  65-70%    Narrative    [id number]  [id number]  [id number]  [id number]^CLEMENCIA^BALTA^ELLIS     [facility]  Echocardiography Laboratory  [address] [place] [address]     Name: ABDIRASHID EPSTEIN  MRN: [id number]  : 1945  Study Date: 2023 10:20 AM  Age: 77 yrs  Gender: Male  Patient Location: [facility]  Reason For Study: SOB  Ordering Physician: BALTA KHANNA  Referring Physician: Yaneli Dozier  Performed By: Radha Wilson     BSA: 1.8 m2  Height: 64 in  Weight: 164 lb  HR: 110  BP: 104/54 mmHg  ______________________________________________________________________________  Procedure  Complete Portable Echo Adult. Optison (NDC #[id number]) given  intravenously.  ______________________________________________________________________________  Interpretation Summary     1. Left ventricular systolic function is normal. The visual ejection fraction  is 65-70%.  2. No regional wall motion abnormalities noted.  3. The right ventricle is normal in structure, function and size.  4. There is mild (1+) tricuspid regurgitation.  5. In comparison with the prior report, there has been no significant change.  ______________________________________________________________________________  Left Ventricle  The left ventricle is normal in size. There is normal left ventricular wall  thickness. The visual ejection fraction is 65-70%. Left ventricular diastolic  function is indeterminate. Left ventricular systolic function is normal. No  regional wall motion abnormalities noted.     Right Ventricle  The right ventricle is normal in structure, function and size.     Atria  Normal left atrial size. Right atrial size is normal. There is no color  Doppler evidence of an atrial shunt.     Mitral Valve  The mitral valve is normal in structure and function.     Tricuspid Valve  The tricuspid valve is normal in structure and function. There is mild (1+)  tricuspid regurgitation.     Aortic Valve  The aortic valve is normal in structure and function.     Pulmonic Valve  The pulmonic valve is not well seen, but is grossly normal.     Vessels  Normal size aorta. IVC diameter <2.1 cm collapsing >50% with sniff suggests a  normal RA pressure of 3 mmHg.     Pericardium  There is no pericardial effusion.     ______________________________________________________________________________  MMode/2D Measurements & Calculations  IVSd: 1.1 cm  LVIDd: 4.3 cm  LVIDs: 2.7 cm  LVPWd: 1.1 cm  FS: 37.7 %  LV mass(C)d: 161.9 grams  LV mass(C)dI: 90.1 grams/m2     Ao root diam: 3.6 cm  LA dimension: 4.1 cm  asc Aorta Diam: 2.9 cm  LA/Ao: 1.1  LA Volume (BP): 45.2 ml  LA Volume Index (BP): 25.1 ml/m2  RWT:  0.51  TAPSE: 2.1 cm     ______________________________________________________________________________  Report approved by: Dell Maldonado 01/16/2023 12:14 PM           *Note: Due to a large number of results and/or encounters for the requested time period, some results have not been displayed. A complete set of results can be found in Results Review.       Discharge Medications   Discharge Medication List as of 1/22/2023 11:25 AM      START taking these medications    Details   potassium chloride (KLOR-CON) 20 MEQ packet Take 20 mEq by mouth 2 times daily for 30 days, Disp-60 packet, R-0, E-Prescribe         CONTINUE these medications which have NOT CHANGED    Details   acetaminophen (TYLENOL) 325 MG tablet Take 3 tablets (975 mg) by mouth 3 times daily, Disp-60 tablet, R-1, E-Prescribe      albuterol (ACCUNEB) 1.25 MG/3ML neb solution Take 1 vial (1.25 mg) by nebulization every 4 hours as needed for shortness of breath or wheezing, Disp-90 mL, R-4, E-Prescribe      albuterol (PROAIR HFA/PROVENTIL HFA/VENTOLIN HFA) 108 (90 Base) MCG/ACT inhaler INHALE 2 PUFFS BY MOUTH EVERY 6 HOURS AS NEEDED FOR WHEEZE OR FOR SHORTNESS OF BREATH, Disp-18 g, R-3, E-Prescribe      amLODIPine (NORVASC) 5 MG tablet Take 5 mg by mouth daily, Historical      ascorbic acid 1000 MG TABS tablet Take 1,000 mg by mouth daily, Historical      aspirin 81 MG tablet Take 1 tablet by mouth 2 times daily, Historical      calcium carbonate (OS-KARLIE) 1500 (600 Ca) MG tablet Take 600 mg by mouth 2 times daily (with meals), Historical      cholecalciferol 25 MCG (1000 UT) TABS Take 1,000 Units by mouth daily , Historical      DULoxetine (CYMBALTA) 20 MG capsule Take 1 capsule (20 mg) by mouth 2 times daily, Disp-180 capsule, R-3, E-Prescribe      Fluticasone-Umeclidin-Vilanterol (TRELEGY ELLIPTA) 100-62.5-25 MCG/INH oral inhaler Inhale 1 puff into the lungs daily, Disp-90 each, R-3, Local Print      furosemide (LASIX) 20 MG tablet Take 1 tablet  (20 mg) by mouth 2 times daily In morning and at noon, Historical      guaiFENesin (MUCINEX) 600 MG 12 hr tablet Take 600 mg by mouth 2 times daily as needed for congestion, Historical      hydrOXYzine (ATARAX) 10 MG tablet Take 1 tablet (10 mg) by mouth 3 times daily as needed for itching, Disp-90 tablet, R-3, E-Prescribe      levothyroxine (SYNTHROID/LEVOTHROID) 137 MCG tablet Take 1 tablet (137 mcg) by mouth daily , starting on 21, Disp-90 tablet, R-1, E-Prescribe      Lidocaine (LIDOCARE) 4 % Patch Place 1 patch onto the skin every 24 hours for 10 days To prevent lidocaine toxicity, patient should be patch free for 12 hrs daily.Disp-10 patch, [id number]      metoprolol succinate ER (TOPROL XL) 50 MG 24 hr tablet Take 1 tablet (50 mg) by mouth daily Takes in AM, Historical      multivitamin w/minerals (THERA-VIT-M) tablet Take 1 tablet by mouth daily , Historical      naloxone (NARCAN) 4 MG/0.1ML nasal spray Spray 1 spray (4 mg) into one nostril alternating nostrils once as needed for opioid reversal every 2-3 minutes until assistance arrives, Disp-0.2 mL, R-3, E-Prescribe      nitroGLYcerin (NITROSTAT) 0.4 MG sublingual tablet FOR CHEST PAIN PLACE 1 TAB UNDER TONGUE EVERY 5 MIN FOR 3 DOSES. IF SYMPTOMS PERSIST 5 MIN AFTER 1ST DOSE CALL 911, Disp-25 tablet, R-3, E-PrescribeDX Code Needed  PLEASE SEND PT STATES ONES HE HAS ARE CLOSE TO .      rosuvastatin (CRESTOR) 10 MG tablet TAKE 1 TABLET BY MOUTH EVERY DAY, Disp-90 tablet, R-3, E-PrescribeDX Code Needed  .      senna-docusate (SENOKOT-S/PERICOLACE) 8.6-50 MG tablet Take 1 tablet by mouth 2 times daily as needed for constipation, Historical         STOP taking these medications       cefuroxime (CEFTIN) 500 MG tablet Comments:   Reason for Stopping:         cyclobenzaprine (FLEXERIL) 5 MG tablet Comments:   Reason for Stopping:         oxyCODONE (ROXICODONE) 5 MG tablet Comments:   Reason for Stopping:             Allergies   Allergies   Allergen  Reactions     Levaquin Difficulty breathing     Plavix [Clopidogrel Bisulfate] Itching     Atorvastatin Calcium Cramps     lipitor     Cats      Dogs      Hctz [Hydrochlorothiazide]      Rash on legs     Sulfasalazine Other (See Comments)     Stomach cramps        pcp  Urology   Thoracic surgery - Dr Dumont w/ CT chest without contrast 6 weeks  Pulmonary

## 2023-01-22 NOTE — PLAN OF CARE
Goal Outcome Evaluation:                      BEHAVIOR & AGGRESSION TOOL: Green   ABNL VS/O2: VSS on 1L of O2, takes on and off per self.  MOBILITY: independent with walker  PAIN MANAGMENT: Denies pain. Refused schedule Tylenol and lidoderm patch  DIET: Regular.   BOWEL/BLADDER: UCO at 1100 ,has voided x2 in good amts with low PVR's. Loose stool x1, had immodium x 2  ABNL LAB/BG:  K+3.3, protocol done -no recheck per MD. Will start scheduled K+ tomorrow.  DRAIN/DEVICES: Peripheral IV SL right arm.   SKIN: Dusky/fragile, scattered bruising, blotchy legs- edematous; Coccyx pink blanchable.  Bilateral ears tender behind earlobes from oxygen tubing, foam placed on oxygen tubing, patient states that helps.  Large area of bruising behind right leg, pt states it is from the catheter tubing, denied pain.  TESTS/PROCEDURES: None scheduled.   D/C DAY/GOALS/PLACE: PT recommends home with wife, Discharge planned for 1/22 around noon.  OTHER IMPORTANT INFO: ACEVES. LS diminished. Patient on po Lasix BID.Jone ankle edema.

## 2023-01-22 NOTE — PLAN OF CARE
Goal Outcome Evaluation:               Discharge    Patient discharged to home via car with wife/O2  Care plan note:Pt alert and oriented x4,pleasant and cooperative. Denies pain. Tolerating diet. Continues to have loose stools 1-2x/shift.O2 on at 1l/nc to keep sats >90%.Pt ambulating independently in halls. Voiding in good amts without any problem.GIVEN AVS with teachback.    Listed belongings gathered and given to patient (including from security/pharmacy). Yes  Care Plan and Patient education resolved: Yes  Prescriptions if needed, hard copies sent with patient  NA  Medication Bin checked and emptied on discharge Yes  SW/care coordinator/charge RN aware of discharge: Yes

## 2023-01-22 NOTE — PLAN OF CARE
"Occupational Therapy Discharge Summary    Reason for therapy discharge:    Discharged to home.    Progress towards therapy goal(s). See goals on Care Plan in [facility] electronic health record for goal details.  Goals partially met.  Barriers to achieving goals:   limited tolerance for therapy and discharge from facility.    Therapy recommendation(s):      Per OT note dated 1/20: \"Home with initial SBA with tub/shower transfers and assistance with all IADLs including meals, home management, laundry/lifting, shopping, etc. Spouse reported that she can assist with IADLs. Recommend home OT to address saftey with I/ADLs.\"           "

## 2023-01-22 NOTE — PROGRESS NOTES
Pt discharging to home with resumption of home care RN/PT with [facility].  Updated [facility] with discharge today and they will contact pt for resumption of care visits.    Pt has an appointment with his primary already  Scheduled for 1/23 and it is on his AVS    Eliana Metz RN, BS  Care Coordinator  [email or web address]

## 2023-01-22 NOTE — PLAN OF CARE
Physical Therapy Discharge Summary    Reason for therapy discharge:    Discharged to home with home therapy.    Progress towards therapy goal(s). See goals on Care Plan in [facility] electronic health record for goal details.  Goals partially met.  Barriers to achieving goals:   discharge from facility.    Therapy recommendation(s):    Continued therapy is recommended.  Rationale/Recommendations:  recommend continued HHPT to progress towards independence with functional mobility.

## 2023-01-23 ENCOUNTER — TELEPHONE (OUTPATIENT)
Dept: PULMONOLOGY | Facility: CLINIC | Age: 78
End: 2023-01-23

## 2023-01-23 ENCOUNTER — TRANSFERRED RECORDS (OUTPATIENT)
Dept: HEALTH INFORMATION MANAGEMENT | Facility: CLINIC | Age: 78
End: 2023-01-23

## 2023-01-23 ENCOUNTER — TELEPHONE (OUTPATIENT)
Dept: FAMILY MEDICINE | Facility: CLINIC | Age: 78
End: 2023-01-23

## 2023-01-23 ENCOUNTER — OFFICE VISIT (OUTPATIENT)
Dept: FAMILY MEDICINE | Facility: CLINIC | Age: 78
End: 2023-01-23
Payer: MEDICARE

## 2023-01-23 VITALS
DIASTOLIC BLOOD PRESSURE: 80 MMHG | HEART RATE: 70 BPM | TEMPERATURE: 95.3 F | RESPIRATION RATE: 22 BRPM | HEIGHT: 64 IN | OXYGEN SATURATION: 96 % | WEIGHT: 160.5 LBS | SYSTOLIC BLOOD PRESSURE: 138 MMHG | BODY MASS INDEX: 27.4 KG/M2

## 2023-01-23 DIAGNOSIS — G89.4 CHRONIC PAIN DISORDER: ICD-10-CM

## 2023-01-23 DIAGNOSIS — Z23 NEED FOR VACCINATION: ICD-10-CM

## 2023-01-23 DIAGNOSIS — C73 METASTASIS FROM THYROID CANCER (H): ICD-10-CM

## 2023-01-23 DIAGNOSIS — R57.9 SHOCK (H): ICD-10-CM

## 2023-01-23 DIAGNOSIS — D84.9 IMMUNODEFICIENCY (H): ICD-10-CM

## 2023-01-23 DIAGNOSIS — E87.6 HYPOKALEMIA: ICD-10-CM

## 2023-01-23 DIAGNOSIS — J15.69: ICD-10-CM

## 2023-01-23 DIAGNOSIS — M05.711 RHEUMATOID ARTHRITIS INVOLVING RIGHT SHOULDER WITH POSITIVE RHEUMATOID FACTOR (H): ICD-10-CM

## 2023-01-23 DIAGNOSIS — C79.9 METASTASIS FROM THYROID CANCER (H): ICD-10-CM

## 2023-01-23 DIAGNOSIS — N17.9 AKI (ACUTE KIDNEY INJURY) (H): ICD-10-CM

## 2023-01-23 DIAGNOSIS — R73.03 PREDIABETES: ICD-10-CM

## 2023-01-23 DIAGNOSIS — S22.32XD CLOSED FRACTURE OF ONE RIB OF LEFT SIDE WITH ROUTINE HEALING, SUBSEQUENT ENCOUNTER: ICD-10-CM

## 2023-01-23 DIAGNOSIS — J44.9 COPD, VERY SEVERE (H): Primary | ICD-10-CM

## 2023-01-23 DIAGNOSIS — N18.2 CHRONIC KIDNEY DISEASE, STAGE 2 (MILD): ICD-10-CM

## 2023-01-23 PROBLEM — K74.69 OTHER CIRRHOSIS OF LIVER (H): Status: RESOLVED | Noted: 2023-01-23 | Resolved: 2023-01-23

## 2023-01-23 PROBLEM — K74.69 OTHER CIRRHOSIS OF LIVER (H): Status: ACTIVE | Noted: 2023-01-23

## 2023-01-23 LAB
ALBUMIN SERPL BCG-MCNC: 3.4 G/DL (ref 3.5–5.2)
ALP SERPL-CCNC: 81 U/L (ref 40–129)
ALT SERPL W P-5'-P-CCNC: 26 U/L (ref 10–50)
ANION GAP SERPL CALCULATED.3IONS-SCNC: 14 MMOL/L (ref 7–15)
AST SERPL W P-5'-P-CCNC: 17 U/L (ref 10–50)
BILIRUB SERPL-MCNC: 0.6 MG/DL
BUN SERPL-MCNC: 5.2 MG/DL (ref 8–23)
CALCIUM SERPL-MCNC: 8.9 MG/DL (ref 8.8–10.2)
CHLORIDE SERPL-SCNC: 102 MMOL/L (ref 98–107)
CREAT SERPL-MCNC: 0.75 MG/DL (ref 0.67–1.17)
CREAT UR-MCNC: 40 MG/DL
DEPRECATED HCO3 PLAS-SCNC: 24 MMOL/L (ref 22–29)
ERYTHROCYTE [DISTWIDTH] IN BLOOD BY AUTOMATED COUNT: 18.1 % (ref 10–15)
GFR SERPL CREATININE-BSD FRML MDRD: >90 ML/MIN/1.73M2
GLUCOSE SERPL-MCNC: 92 MG/DL (ref 70–99)
HCT VFR BLD AUTO: 42 % (ref 40–53)
HGB BLD-MCNC: 13.4 G/DL (ref 13.3–17.7)
MCH RBC QN AUTO: 28.8 PG (ref 26.5–33)
MCHC RBC AUTO-ENTMCNC: 31.9 G/DL (ref 31.5–36.5)
MCV RBC AUTO: 90 FL (ref 78–100)
PLATELET # BLD AUTO: 186 10E3/UL (ref 150–450)
POTASSIUM SERPL-SCNC: 3.9 MMOL/L (ref 3.4–5.3)
PROT SERPL-MCNC: 7.3 G/DL (ref 6.4–8.3)
RBC # BLD AUTO: 4.65 10E6/UL (ref 4.4–5.9)
SODIUM SERPL-SCNC: 140 MMOL/L (ref 136–145)
WBC # BLD AUTO: 11.2 10E3/UL (ref 4–11)

## 2023-01-23 PROCEDURE — 85027 COMPLETE CBC AUTOMATED: CPT | Performed by: INTERNAL MEDICINE

## 2023-01-23 PROCEDURE — 80307 DRUG TEST PRSMV CHEM ANLYZR: CPT | Performed by: INTERNAL MEDICINE

## 2023-01-23 PROCEDURE — 90677 PCV20 VACCINE IM: CPT | Performed by: INTERNAL MEDICINE

## 2023-01-23 PROCEDURE — 99496 TRANSJ CARE MGMT HIGH F2F 7D: CPT | Mod: 25 | Performed by: INTERNAL MEDICINE

## 2023-01-23 PROCEDURE — 80053 COMPREHEN METABOLIC PANEL: CPT | Performed by: INTERNAL MEDICINE

## 2023-01-23 PROCEDURE — 36415 COLL VENOUS BLD VENIPUNCTURE: CPT | Performed by: INTERNAL MEDICINE

## 2023-01-23 PROCEDURE — G0009 ADMIN PNEUMOCOCCAL VACCINE: HCPCS | Performed by: INTERNAL MEDICINE

## 2023-01-23 RX ORDER — AZITHROMYCIN 250 MG/1
TABLET, FILM COATED ORAL
Qty: 6 TABLET | Refills: 0 | Status: SHIPPED | OUTPATIENT
Start: 2023-01-23 | End: 2023-02-09

## 2023-01-23 RX ORDER — POTASSIUM CHLORIDE 1500 MG/1
20 TABLET, EXTENDED RELEASE ORAL 2 TIMES DAILY
Qty: 180 TABLET | Refills: 3 | Status: ON HOLD | OUTPATIENT
Start: 2023-01-23 | End: 2023-04-23

## 2023-01-23 RX ORDER — PREDNISONE 20 MG/1
TABLET ORAL
Qty: 20 TABLET | Refills: 0 | Status: SHIPPED | OUTPATIENT
Start: 2023-01-23 | End: 2023-02-09

## 2023-01-23 ASSESSMENT — PATIENT HEALTH QUESTIONNAIRE - PHQ9
SUM OF ALL RESPONSES TO PHQ QUESTIONS 1-9: 24
10. IF YOU CHECKED OFF ANY PROBLEMS, HOW DIFFICULT HAVE THESE PROBLEMS MADE IT FOR YOU TO DO YOUR WORK, TAKE CARE OF THINGS AT HOME, OR GET ALONG WITH OTHER PEOPLE: VERY DIFFICULT

## 2023-01-23 ASSESSMENT — PAIN SCALES - GENERAL: PAINLEVEL: SEVERE PAIN (6)

## 2023-01-23 NOTE — TELEPHONE ENCOUNTER
"Lexis with [facility] calling to request a Delay in start of care for Skilled Nursing for this patient to 1/25/2023 due to \"not answering the door\". Lexis had no additional questions or concerns.     Routing to PCP for review.   "

## 2023-01-23 NOTE — PROGRESS NOTES
This extremely complex patient has a right middle lobe pneumonia which will need to be followed up by CT scan to ensure resolution because he has metastatic thyroid cancer to the lung    His breathing is now comfortable he is not coughing up phlegm    His left-sided rib fracture is healing    He had hypokalemia but we will check another potassium and creatinine today    He does not need any more pain control and hydrocodone that he is taking    He is not taking any medication for rheumatoid arthritis and also is off steroids    I will provide him steroids and antibiotic on hand because of severe COPD and immunodeficiency    He will continue to follow-up with rheumatology as well as oncology    He continues to use oxygen at present which can come off and he feels a    His sepsis is improved  He is on duloxetine for depression and is more stable    He has prediabetes which with steroids can turn into diabetes    ICD-10-CM    1. COPD, very severe (H)  J44.9 Nutrition Referral     predniSONE (DELTASONE) 20 MG tablet     azithromycin (ZITHROMAX) 250 MG tablet     CT Chest w/o Contrast      2. Shock (H)  R57.9       3. CALLUM (acute kidney injury) (H)  N17.9 Nutrition Referral      4. Pneumonia of right middle lobe due to other aerobic gram-negative bacteria (H)  J15.6 CT Chest w/o Contrast      5. Metastasis from thyroid cancer (H)  C79.9 Nutrition Referral    C73 CT Chest w/o Contrast      6. Closed fracture of one rib of left side with routine healing, subsequent encounter  S22.32XD       7. 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)  M05.711 Nutrition Referral     CT Chest w/o Contrast      8. Chronic pain disorder  G89.4 [id number] - Urine Drug Confirmation Panel (Comprehensive)     [id number] - Urine Drug Confirmation Panel (Comprehensive)      9. Immunodeficiency (H)  D84.9       10. Prediabetes  R73.03 Nutrition Referral      11. Chronic kidney disease, stage 2 (mild)  N18.2 Nutrition  Referral     CBC with Platelets       Comprehensive metabolic panel     CBC with Platelets       Comprehensive metabolic panel      12. Hypokalemia  E87.6 potassium chloride ER (KLOR-CON M) 20 MEQ CR tablet      13. Need for vaccination  Z23 Pneumococcal 20 Valent Conjugate (Prevnar 20)        Prevnar 20 will be given today    Artur Terry is a 77 year old, presenting for the following health issues:  Follow Up and Medication Follow-up (potassium chloride (KLOR-CON) 20 MEQ packet switch to Potassium Chloride Sheryl Er )    Patient fell accidentally when a lot of work was being done in the kitchen  Patient has been treated for right upper lobe masslike lesion which was PET scan positive and turned out to be a lot of scar tissue  This was felt to be metastatic thyroid cancer  Patient was admitted to hospital on January 9 until the 14th when he injured his left eighth rib after that fall in the kitchen  When he got tangled in a hose    But he was readmitted to the hospital for shortness of breath and was treated for committee acquired pneumonia even during first time  Readmitted the very next day for possible sepsis    Patient's last admission to the hospital was in October for pneumonia  Patient states that he is feeling better now  The pain is not controlled  His wife has a lot of questions about his potassium and kidney functions    Lab Results   Component Value Date    A1C 5.7 11/15/2022    A1C 5.1 11/01/2021     Harrison Thomas is a 77 year old male with a complex PMH including thyroid CA (s/p thyroidectomy), severe COPD, CAD, HTN, DLD, RA, BRENNEN, RUL lung mass (probable malignancy) who was recently admitted from 1/9 to 1/14/23 following a  mechanical  fall with left 8th rib fracture and evaluated for shortness of breath . He was evaluated by pulmonology, treated for likely community acquired pneumonia .  He was discharged on 1/14/23 with PO Ceftin and pain control for rib fracture  with prn oxycodone ,  flexeril and lidocaine patch.     He was brought back to ED on 1/15/23 with altered mental status and a fall. He was noted to be significantly hypoxic, obtunded and hypotensive to SBP 80s. On admission noted worsened cr 1.22; lactic acid 7.6; BNP 2374, pro calcitonin 1.43; normal troponin, Blood glucose 66.  It is unclear what caused this rapid return to hospital. Question opiate overdose as he responded narcan, but wife reports she counted pills and there were none missing. Possible he had aspiration.      He follows  with Dr. Dumont and Pulmonology for probable lung cancer with possible radiation pneumonitis. He has been noted with RUL mass like lesion (PET positive). He underwent VATS biopsy (02/2022) by Dr Dumont but was noted to be incomplete due to a lot of scar tissue. He subsequently underwent empiric radiation therapy for enlarging right upper lobe density.      He was also recently hospitalized in 10/2022 following a fall, noted with pneumonia and/or radiation pneumonitis. Received a prolonged steroid taper at that time. In early December, 2022 he was treated empirically with Ceftin for 7 days (for CT showing scattered micro nodules with concern for possible infection). Several workup for Histo, Blasto, Legionella, Strep and Aspergillus were negative. Last seen in pulmonology clinic on 12/30/2022 at which time he had completed the prolonged steroid taper and had been off Oxygen since 12/18/22.     Septic shock, resolved  Healthcare associated pneumonia vs aspiration PNA  Acute on chronic hypoxic respiratory failure  lactic acidosis  metabolic encephalopathy, unclear etiology, likely opioid induced  recent admission for Closed fracture of L 8th rib s/p mechanical fall   Severe COPD/ emphysema  -  Has significant pulmonary history including severe COPD, right upper lobe density/mass, h/o radiation pneumonitis and recent hospitalizations as noted (as summarized above, also discussed below)  - as noted  above, was hospitalized from 1/9 to 1/14/23 following a  mechanical  fall with left 8th rib fracture and SOB; was evaluated by pulmonology, and given recent worsening in the RUL, suspected likely infectious process; was treated for likely community acquired pneumonia with 4 days of Rocephin and was d/zabrina on PO Ceftin and pain control for rib fracture  with prn oxycodone , flexeril and lidocaine patch  - brought back on 1/15 with AMS and a fall; was noted significantly hypoxic, obtunded and hypotensive to SBP 80s. On admission noted worsened cr 1.22; lactic acid 7.6--> 4.2; BNP 2374, pro calcitonin 1.43; normal troponin, Blood glucose 66     - Unclear for his acute decompensation following discharge, but also he has significantly improved including mentation with supportive care and Narcan drip, thus some concern for likely unintentional opioid overdose although wife reports there were no missing oxycodones.  -  on admission CT head showed no acute intracranial process  - repeat CT chest 1/15 showed no PE and grossly unchanged masslike consolidation in the inferior right upper lobe and nodular consolidation in the right lower lobe, unchanged indexed pulmonary nodules  - started on empiric vancomycin and Zosyn for likely healthcare associated pneumonia with sepsis  - was started on BiPAP support;  hypotension responded to IV fluids/boluses     - weaned off of BiPAP to 2 L nasal cannula oxygen  - off Narcan drip, mentation has improved and stable  - no further episodes of hypotension; elevated BNP, worsened from last hospitalization; ECHO 1/16 UR with LVEF 65-70%, no regional wall motion abnormalities noted; will continue to hold PTA Lasix   - remains afebrile  - stop zosyn on 1/20  - continue lidocaine patch for pain control from rib fracture  - encourage incentive spirometry; wean oxygen as able; might need some home oxygen  - continue nebulizers Q4 PRN ; continue Trelegy Ellipta (patient to use his own supply from  home, d/w Pharmacy)   - 1/20 improving, ambulating further with PT and walker. Discussed with patient and wife, they are concerned about having adequate abx coverage.  Agreed with 2 more days of augmentin PO.  Working on discharge plans with SW.   continued improvement, down to 1 L nasal cannula and independent in the room.  We will plan for home discharge 1/22 with home health care arrangement.  - 1/22 doing well, 1L nc continued, has PCP and Thoracic surgery f/u already scheduled for tmrw.  Home health resumed. Spouse assist at home. No further abx at discharge has had substantial regimen in hospital. He is planning on probiotic to continue at home.     Diarrhea, improving  Mild hypokalemia  - having loose stools X 3 in 24 hrs; has been afebrile and no abdominal discomfort but given presentation with likely septic shock, worsening leukocytosis  - cdiff negative  - K 3.3-- will start potassium supplementation protocol -we will schedule 20 meq KCl daily.  Can stop protocol.  BMP follow-up in 3 days with home health RN or alternatively he is seeing PCP tmrw - further planning from there as indicated.  - probiotic  - add fiber  - prn simethicone, imodium     Abnormal CT chest with RUL density/mass dating back prior to 2/2022, h/o empiric radiation therapy  obstructive sleep apnea-- not using CPAP  H/o radiation pneumonitis  - follows at [facility] (last seen in clinic on 12/30/22) for his combination of severe COPD with emphysema and abnormal chest imaging with incomplete at attempted VATS lung biopsy and radiation for possible thyroid or lung cancer with possible radiation pneumonitis.  - was due to see Dr. Dumont 1/10 but unfortunately got admitted  - CT chest this admission as noted above  - evaluated by pulmonology during last hospitalization (signed off 1/12/23), suggested to continue antibiotics for likely CAP, repeat CT chest without contrast in  6 weeks     Urinary retention  Likely acute renal failure  - renal function improved, creatinine 1.22-----> 1.01;  - this admission noted difficulty voiding, needed intermittent straight cath with difficulty  - Mir catheter was placed (1/16/23) ; evaluated by urology-- suggested to continue Flomax 0.4 mg daily (started 1/16) and to have voiding trial in 1 week; to follow up with Urology  -1/21-reviewed urology note from several days ago with essentially plan to do a trial of void either yesterday or in 2 days.  With plans for home discharge tomorrow patient in agreement with a trial of voiding today which has been successful so far as of late afternoon.    - We will continue Flomax and plan for urology clinic follow-up.     CAD with hx Cx stenting 1997  HTN  HLD  [amlodipine 5 mg daily, aspirin 81 mg daily, furosemide 20 mg daily, metoprolol XL 50 mg day, rosuvastatin 10 mg daily]  Plan:  - Continue PTA meds  - holding PTA Lasix for now given the need for fluid boluses due to hypertension on presentation; ECHO 1/16 UR with LVEF 65-70%, no regional wall motion abnormalities noted  - BP stable; resumed PTA Toprol-XL (1/17/23) with hold parameters     Metastatic papillary thyroid cancer  Hypothyroidism  S/p total thyroidectomy 8/2021 with 4/4 LN positive. Treated with radioactive iodine ablation 9/2021.   -  continue  levothyroxine 137 mcg daily      BRENNEN (obstructive sleep apnea)  - does not use CPAP; continue to follow with pulmonology     Rheumatoid arthritis (H)  Follows with rheumatology. Not currently on medications for this   Date of Admission:  1/15/2023  Date of Discharge:  1/22/2023 11:54 AM  Discharging Provider: Scott Carbajal MD  Discharge Service: Hospitalist Service        Discharge Diagnoses     Septic shock, resolved  Healthcare associated pneumonia vs aspiration PNA  Acute on chronic hypoxic respiratory failure  lactic acidosis  metabolic encephalopathy, unclear etiology, likely opioid  induced  recent admission for Closed fracture of L 8th rib s/p mechanical fall   Severe COPD/ emphysema  Diarrhea, improving  Mild hypokalemia  Abnormal CT chest with RUL density/mass dating back prior to 2/2022, h/o empiric radiation therapy  obstructive sleep apnea-- not using CPAP  H/o radiation pneumonitis  Urinary retention - resolved  Acute renal failure  CAD with hx Cx stenting 1997  HTN  HLD  Metastatic papillary thyroid cancer  Hypothyroidism  BRENNEN  RA      1.  No evidence of pulmonary embolus.  2.  Grossly unchanged masslike consolidation in the inferior right upper lobe and nodular consolidation in the right lower lobe. Recommend followup Chest CT to ensure resolution.  3.  Unchanged indexed pulmonary nodules.  4.  Overall slightly decreased size of multiple mediastinal lymph nodes, likely reactive.    History of Present Illness       CKD: He uses over the counter pain medication, including Tylenol or Ibuprophen, nothing for about a week, two times daily.    COPD:  He presents for follow up of COPD.  Overall, COPD symptoms are much worse since last visit. He has more than usual fatigue or shortness of breath with exertion and same as usual shortness of breath at rest.  He sometimes coughs and does not have change in sputum. No recent fever. He can walk the length of 3-5 rooms without stopping to rest. He can not walk a flight of stairs without resting. The patient has had an ED, urgent care, or hospital admission because of COPD since the last visit. He states he has had 2 visit(s) to an ED, Urgent Care, or Hospital due to his COPD.    Heart Failure:  He presents for follow up of heart failure. He is experiencing shortness of breath with activity only, which is same as usual. He is experiencing lower extremity edema which is worse than usual. He denies orthopenea and coughs at night. Patient is not checking weight daily. He has recently had a weight decrease. He has no side effects from medications.  He  "has has a medical visit for heart failure 1 time since the last visit.    Hypertension: He presents for follow up of hypertension.  He does check blood pressure  regularly outside of the clinic. Outside blood pressures have been over 140/90. He follows a low salt diet.      Today's PHQ-9         PHQ-9 Total Score: 24    PHQ-9 Q9 Thoughts of better off dead/self-harm past 2 weeks :   Not at all    How difficult have these problems made it for you to do your work, take care of things at home, or get along with other people: Very difficult             Review of Systems   10 point ROS of systems including Constitutional, Eyes, Respiratory, Cardiovascular, Gastroenterology, Genitourinary, Integumentary, Muscularskeletal, Psychiatric were all negative except for pertinent positives noted in my HPI.        Objective    BP (!) 148/84 (BP Location: Left arm, Patient Position: Sitting, Cuff Size: Adult Regular)   Pulse 70   Temp (!) 95.3  F (35.2  C) (Temporal)   Resp 22   Ht 1.626 m (5' 4\")   Wt 72.8 kg (160 lb 8 oz)   SpO2 96%   BMI 27.55 kg/m    Body mass index is 27.55 kg/m .  Physical Exam   GENERAL: healthy, alert and no distress  NECK: no adenopathy, no asymmetry, masses, or scars and thyroid normal to palpation  RESP: lungs clear to auscultation - no rales, rhonchi or wheezes  CV: regular rate and rhythm, normal S1 S2, no S3 or S4, no murmur, click or rub, no peripheral edema and peripheral pulses strong  MS: no gross musculoskeletal defects noted, no edema    Psychiatric exam reveals a little bit subdued otherwise appropriate and insight and judgment is intact  Lab Results   Component Value Date    WBC 12.6 01/20/2023    WBC 6.3 07/02/2021     Lab Results   Component Value Date    RBC 4.00 01/20/2023    RBC 4.51 07/02/2021     Lab Results   Component Value Date    HGB 11.8 01/20/2023    HGB 13.2 07/02/2021     Lab Results   Component Value Date    HCT 35.6 01/20/2023    HCT 41.2 07/02/2021     No components " found for: MCT  Lab Results   Component Value Date    MCV 89 01/20/2023    MCV 91 07/02/2021     Lab Results   Component Value Date    MCH 29.5 01/20/2023    MCH 29.3 07/02/2021     Lab Results   Component Value Date    MCHC 33.1 01/20/2023    MCHC 32.0 07/02/2021     Lab Results   Component Value Date    RDW 17.4 01/20/2023    RDW 13.9 07/02/2021     Lab Results   Component Value Date     01/20/2023     07/02/2021     Last Comprehensive Metabolic Panel:  Sodium   Date Value Ref Range Status   01/20/2023 141 136 - 145 mmol/L Final   07/02/2021 142 133 - 144 mmol/L Final     Potassium   Date Value Ref Range Status   01/21/2023 3.3 (L) 3.4 - 5.3 mmol/L Final   01/17/2023 3.3 (L) 3.4 - 5.3 mmol/L Final   07/02/2021 3.9 3.4 - 5.3 mmol/L Final     Chloride   Date Value Ref Range Status   01/20/2023 102 98 - 107 mmol/L Final   01/17/2023 112 (H) 94 - 109 mmol/L Final   07/02/2021 109 94 - 109 mmol/L Final     Carbon Dioxide   Date Value Ref Range Status   07/02/2021 29 20 - 32 mmol/L Final     Carbon Dioxide (CO2)   Date Value Ref Range Status   01/20/2023 30 (H) 22 - 29 mmol/L Final   01/17/2023 23 20 - 32 mmol/L Final     Anion Gap   Date Value Ref Range Status   01/20/2023 9 7 - 15 mmol/L Final   01/17/2023 9 3 - 14 mmol/L Final   07/02/2021 4 3 - 14 mmol/L Final     Glucose   Date Value Ref Range Status   01/20/2023 86 70 - 99 mg/dL Final   01/17/2023 70 70 - 99 mg/dL Final   07/02/2021 94 70 - 99 mg/dL Final     GLUCOSE BY METER POCT   Date Value Ref Range Status   01/19/2023 130 (H) 70 - 99 mg/dL Final     Urea Nitrogen   Date Value Ref Range Status   01/20/2023 3.4 (L) 8.0 - 23.0 mg/dL Final   01/17/2023 11 7 - 30 mg/dL Final   07/02/2021 16 7 - 30 mg/dL Final     Creatinine   Date Value Ref Range Status   01/20/2023 0.74 0.67 - 1.17 mg/dL Final   07/02/2021 0.89 0.66 - 1.25 mg/dL Final     GFR Estimate   Date Value Ref Range Status   01/20/2023 >90 >60 mL/min/1.73m2 Final     Comment:     Effective  December 21, 2021 eGFRcr in adults is calculated using the 2021 CKD-EPI creatinine equation which includes age and gender (Srinivas peterson al., NEJM, DOI: 10.1056/SKPDzg9642158)   07/02/2021 84 >60 mL/min/[1.73_m2] Final     Comment:     Non  GFR Calc  Starting 12/18/2018, serum creatinine based estimated GFR (eGFR) will be   calculated using the Chronic Kidney Disease Epidemiology Collaboration   (CKD-EPI) equation.       Calcium   Date Value Ref Range Status   01/20/2023 8.3 (L) 8.8 - 10.2 mg/dL Final   07/02/2021 8.9 8.5 - 10.1 mg/dL Final                Patient Active Problem List   Diagnosis     Essential hypertension, benign     Pain in joint, upper arm     Allergic rhinitis     Pain in joint, lower leg     Chronic airway obstruction (H)     Monoclonal paraproteinemia     Immunodeficiency (H)     Eczema     COPD (chronic obstructive pulmonary disease) (H)     Transient cerebral ischemia     Bunion     Occipital neuralgia     Hyperlipidemia LDL goal <100     Health Care Home     Low back pain     Advanced directives, counseling/discussion     Olecranon bursitis of right elbow     Bruit     Retina hole     signed & scanned on 09/23/2011  (1-4-2013 printed but not scanned in)      Chronic, continuous use of opioids     Atherosclerosis of native coronary artery of native heart without angina pectoris     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, unspecified thyrotoxicosis type     Right-sided low back pain with right-sided sciatica     Coronary artery disease involving native coronary artery of native heart without angina pectoris     BRENNEN (obstructive sleep apnea)     Rheumatoid arthritis (H)     Hammer toe of right foot     Hallux limitus of right foot     Corn of toe     Non-recurrent unilateral inguinal hernia with obstruction without gangrene     Left inguinal hernia     Metastasis from thyroid cancer (H)     Nodule of upper lobe of right lung     Preoperative examination     Chronic pain disorder      Closed fracture of one rib     Acute on chronic respiratory failure with hypoxia (H)     Closed fracture of one rib of left side, initial encounter     Dehydration     Shock (H)     CALLUM (acute kidney injury) (H)     COPD, very severe (H)     Acute kidney failure with tubular necrosis (H)     Hypokalemia     Current Outpatient Medications   Medication     acetaminophen (TYLENOL) 325 MG tablet     albuterol (ACCUNEB) 1.25 MG/3ML neb solution     albuterol (PROAIR HFA/PROVENTIL HFA/VENTOLIN HFA) 108 (90 Base) MCG/ACT inhaler     amLODIPine (NORVASC) 5 MG tablet     ascorbic acid 1000 MG TABS tablet     aspirin 81 MG tablet     azithromycin (ZITHROMAX) 250 MG tablet     calcium carbonate (OS-KARLIE) 1500 (600 Ca) MG tablet     cholecalciferol 25 MCG (1000 UT) TABS     DULoxetine (CYMBALTA) 20 MG capsule     Fluticasone-Umeclidin-Vilanterol (TRELEGY ELLIPTA) 100-62.5-25 MCG/INH oral inhaler     furosemide (LASIX) 20 MG tablet     guaiFENesin (MUCINEX) 600 MG 12 hr tablet     hydrOXYzine (ATARAX) 10 MG tablet     levothyroxine (SYNTHROID/LEVOTHROID) 137 MCG tablet     Lidocaine (LIDOCARE) 4 % Patch     metoprolol succinate ER (TOPROL XL) 50 MG 24 hr tablet     multivitamin w/minerals (THERA-VIT-M) tablet     naloxone (NARCAN) 4 MG/0.1ML nasal spray     nitroGLYcerin (NITROSTAT) 0.4 MG sublingual tablet     potassium chloride ER (KLOR-CON M) 20 MEQ CR tablet     predniSONE (DELTASONE) 20 MG tablet     rosuvastatin (CRESTOR) 10 MG tablet     senna-docusate (SENOKOT-S/PERICOLACE) 8.6-50 MG tablet     No current facility-administered medications for this visit.

## 2023-01-23 NOTE — TELEPHONE ENCOUNTER
Writer returned call to Loren with [facility] at [phone] extension [phone] asking about a form that needed to be signed. Writer not able to find form sent on 12/22/22 in chart.

## 2023-01-23 NOTE — TELEPHONE ENCOUNTER
[facility] Call Center    Phone Message    May a detailed message be left on voicemail: yes     Reason for Call: Other: Form sent and needs to be signed and sent bacn for home care - form sent on 12/22/22 - order number [id number] - fax back to [phone]     Action Taken: Other: pulm    Travel Screening: Not Applicable

## 2023-01-23 NOTE — NURSING NOTE
Prior to immunization administration, verified patients identity using patient s name and date of birth. Please see Immunization Activity for additional information.     Screening Questionnaire for Adult Immunization    Are you sick today?   No   Do you have allergies to medications, food, a vaccine component or latex?   Yes   Have you ever had a serious reaction after receiving a vaccination?   No   Do you have a long-term health problem with heart, lung, kidney, or metabolic disease (e.g., diabetes), asthma, a blood disorder, no spleen, complement component deficiency, a cochlear implant, or a spinal fluid leak?  Are you on long-term aspirin therapy?   Yes   Do you have cancer, leukemia, HIV/AIDS, or any other immune system problem?   No   Do you have a parent, brother, or sister with an immune system problem?   No   In the past 3 months, have you taken medications that affect  your immune system, such as prednisone, other steroids, or anticancer drugs; drugs for the treatment of rheumatoid arthritis, Crohn s disease, or psoriasis; or have you had radiation treatments?   Yes   Have you had a seizure, or a brain or other nervous system problem?   No   During the past year, have you received a transfusion of blood or blood    products, or been given immune (gamma) globulin or antiviral drug?   No   For women: Are you pregnant or is there a chance you could become       pregnant during the next month?   No   Have you received any vaccinations in the past 4 weeks?   No     Immunization questionnaire was positive for at least one answer.  Notified Dr. Dozier.        Per orders of Dr. Dozier, injection of PCV 20 given by Cleopatra Bustillos. Patient instructed to remain in clinic for 15 minutes afterwards, and to report any adverse reaction to me immediately.       Screening performed by Cleopatra Bustillos on 1/23/2023 at 11:26 AM.

## 2023-01-24 ENCOUNTER — TELEPHONE (OUTPATIENT)
Dept: PULMONOLOGY | Facility: CLINIC | Age: 78
End: 2023-01-24
Payer: MEDICARE

## 2023-01-24 ENCOUNTER — MEDICAL CORRESPONDENCE (OUTPATIENT)
Dept: HEALTH INFORMATION MANAGEMENT | Facility: CLINIC | Age: 78
End: 2023-01-24

## 2023-01-24 ENCOUNTER — TELEPHONE (OUTPATIENT)
Dept: FAMILY MEDICINE | Facility: CLINIC | Age: 78
End: 2023-01-24
Payer: MEDICARE

## 2023-01-24 NOTE — TELEPHONE ENCOUNTER
MTM referral from: Transitions of Care (recent hospital discharge or ED visit)    MTM referral outreach attempt on January 24, 2023 at 2:20 PM      Outcome: Patient is not interested at this time because reviewed with primary, will route to [facility]TM Pharmacist/Provider as an FYI. Thank you for the referral.     Deyanira Hidalgo, [place]

## 2023-01-24 NOTE — TELEPHONE ENCOUNTER
Writer spoke with Loren with [facility] [employer] at [phone] extension [phone] - gave her correct FAX number for our office as she had been FAXing forms to a different number. We will be able to get forms signed for patient once she FAXes them over.

## 2023-01-24 NOTE — RESULT ENCOUNTER NOTE
Daphnie Terry,     This is Jerry Kimbrough and I am covering for Dr. Dozier who is currently out of the office. I had a chance to review your recent results and am happy to report no concerns. Stable electrolytes, kidney function, and blood counts.     Feel free to contact us with additional concerns.   Jerry Kimbrough PA-C on 1/24/2023 at 9:23 AM

## 2023-01-24 NOTE — TELEPHONE ENCOUNTER
Detailed message left for Lexis giving verbal on below requested orders    Zaria Oh RN on 1/24/2023 at 5:10 PM

## 2023-01-25 NOTE — TELEPHONE ENCOUNTER
Clarified with Loren that orders that Dr. Handy requested prior to 12/30 visit are no longer needed as patient completed this under another provider's name prior to the visit. She states to please state that on form and fax it back. Returned the original fax with notes that this order is no longer needed (faxed to [phone])

## 2023-01-26 ENCOUNTER — TELEPHONE (OUTPATIENT)
Dept: FAMILY MEDICINE | Facility: CLINIC | Age: 78
End: 2023-01-26
Payer: MEDICARE

## 2023-01-26 DIAGNOSIS — C73 METASTASIS FROM THYROID CANCER (H): Primary | ICD-10-CM

## 2023-01-26 DIAGNOSIS — C79.9 METASTASIS FROM THYROID CANCER (H): Primary | ICD-10-CM

## 2023-01-26 RX ORDER — OXYCODONE HYDROCHLORIDE 5 MG/1
5 TABLET ORAL 3 TIMES DAILY PRN
Qty: 15 TABLET | Refills: 0 | Status: SHIPPED | OUTPATIENT
Start: 2023-01-26 | End: 2023-01-30

## 2023-01-26 NOTE — TELEPHONE ENCOUNTER
Medication Question or Refill        What medication are you calling about (include dose and sig)?: oxyCODONE (ROXICODONE) tablet 5 mg [[id number]]    Controlled Substance Agreement on file:   CSA -- Patient Level:     [Media Unavailable] Controlled Substance Agreement - Opioid - Scan on 11/1/2021  1:44 PM: 11/1/21       Who prescribed the medication?: Dr. Dozier    Do you need a refill? Yes: has a couple pills left    When did you use the medication last? This am    Patient offered an appointment? Yes: already has one scheduled    Do you have any questions or concerns?  No    Preferred Pharmacy:   [facility]/pharmacy #[id number] [address] [address]  Phone: [phone] Fax: [phone]        Could we send this information to you in [employer] or would you prefer to receive a phone call?:   Patient would prefer a phone call   Okay to leave a detailed message?: Yes at Cell number on file:    Telephone Information:   Mobile [phone]        Detail Level: Detailed

## 2023-01-27 ENCOUNTER — TELEPHONE (OUTPATIENT)
Dept: FAMILY MEDICINE | Facility: CLINIC | Age: 78
End: 2023-01-27

## 2023-01-27 NOTE — TELEPHONE ENCOUNTER
Called patient regarding MD approval below. He will  at pharmacy.     Ora Joe RN on 1/27/2023 at 11:46 AM

## 2023-01-27 NOTE — TELEPHONE ENCOUNTER
Nutrition Education Scheduling Outreach #1:    Call to patient to schedule. Left message with phone number to call to schedule.    Plan for 2nd outreach attempt within 1 week.    Tiesha [facility] On[facility]  Diabetes and Nutrition Scheduling

## 2023-01-30 ENCOUNTER — TELEPHONE (OUTPATIENT)
Dept: FAMILY MEDICINE | Facility: CLINIC | Age: 78
End: 2023-01-30
Payer: MEDICARE

## 2023-01-30 ENCOUNTER — MEDICAL CORRESPONDENCE (OUTPATIENT)
Dept: HEALTH INFORMATION MANAGEMENT | Facility: CLINIC | Age: 78
End: 2023-01-30

## 2023-01-30 DIAGNOSIS — C73 METASTASIS FROM THYROID CANCER (H): ICD-10-CM

## 2023-01-30 DIAGNOSIS — C79.9 METASTASIS FROM THYROID CANCER (H): ICD-10-CM

## 2023-01-30 DIAGNOSIS — N17.9 AKI (ACUTE KIDNEY INJURY) (H): Primary | ICD-10-CM

## 2023-01-30 RX ORDER — PHENOL 1.4 %
10 AEROSOL, SPRAY (ML) MUCOUS MEMBRANE
COMMUNITY
Start: 2023-01-30

## 2023-01-30 RX ORDER — VITAMIN E 200 UNIT
500 CAPSULE ORAL DAILY
COMMUNITY
Start: 2023-01-30

## 2023-01-30 RX ORDER — OXYCODONE HYDROCHLORIDE 5 MG/1
5 TABLET ORAL 3 TIMES DAILY PRN
Qty: 75 TABLET | Refills: 0 | Status: SHIPPED | OUTPATIENT
Start: 2023-01-30 | End: 2023-03-02

## 2023-01-30 NOTE — TELEPHONE ENCOUNTER
OK for Verbal orders as requested. Thank you.    BMP this week would be good. Order signed.    Andrzej Isabel MD  1/30/2023; 3:48 PM

## 2023-01-30 NOTE — TELEPHONE ENCOUNTER
Patient calling stating I called last week for a prescription from Dr. Dozier to fill oxycodone.  States he only received 15 tablets on the 26th of this month.     Ora Joe RN on 1/30/2023 at 1:58 PM

## 2023-01-30 NOTE — TELEPHONE ENCOUNTER
Lexis called from [facility], patient was discharged from the hospital and ready to resume home care. Orders requested: SN 1x/wk x 3 wks, and 2 prns. PT & OT to eval and treat. Verbal okay given.     Lexis is asking if Dr. Dozier wants to order a repeat in BMP. Pt on potassium. Home care nurses can draw in the home.     Also, Lexis is reporting that patient is taking Megared 500 mg and Melatonin 10 mg - OTC. Has been taking for a while.     Call back # [phone].

## 2023-01-31 NOTE — TELEPHONE ENCOUNTER
Call to Lexis with [facility]. Lexis informed of Dr. Isabel's below response. She verbalized understanding and asks:  Is it okay to wait until next Tuesday, 2/7/2023 to check the BMP or must it be drawn this week?       Lexis needs call back with provider's response to [phone] (okay to leave detailed message).      Lauren Lezama, RN BSN MSN  [facility]

## 2023-01-31 NOTE — TELEPHONE ENCOUNTER
Call to Lexis with [facility]. Lexis informed of AVINASH Mcpherson's below response. Lexis verbalized understanding.       Lauren Lezama, RN BSN MSN  [facility]

## 2023-02-03 ENCOUNTER — TELEPHONE (OUTPATIENT)
Dept: FAMILY MEDICINE | Facility: CLINIC | Age: 78
End: 2023-02-03
Payer: MEDICARE

## 2023-02-03 NOTE — TELEPHONE ENCOUNTER
Nutrition Education Scheduling Outreach #2:    Call to patient to schedule. Patient declined to schedule and not covered under insurance.      Tiesha Arredondo  [place] [facility]  Diabetes and Nutrition Scheduling

## 2023-02-03 NOTE — TELEPHONE ENCOUNTER
The Home Care/Assisted Living/Nursing Facility is calling regarding an established patient.  Has the patient seen Home Care in the past or is currently residing in Assisted Living or Nursing Facility? Yes.     Jorge calling from [facility] requesting the following orders that are within the Home Care, Assisted Living or Nursing Home Eval and Treatment standing order and can be signed as standing order signature required by RN.    Preferred Call Back Number: [phone], confidential     PT/OT/Speech Therapy   1 visit PT February 13th. Verbal approval given.    Any additional Orders:  Are there any orders requested, not stated above, that are outside of the standing order and must be routed to a licensed practitioner for approval?    No    Writer has verified [employer]or will send fax to have orders signed.    Lenore PEREZ RN  [facility]

## 2023-02-06 ENCOUNTER — MEDICAL CORRESPONDENCE (OUTPATIENT)
Dept: HEALTH INFORMATION MANAGEMENT | Facility: CLINIC | Age: 78
End: 2023-02-06

## 2023-02-06 ENCOUNTER — NURSE TRIAGE (OUTPATIENT)
Dept: NURSING | Facility: CLINIC | Age: 78
End: 2023-02-06
Payer: MEDICARE

## 2023-02-07 ENCOUNTER — OFFICE VISIT (OUTPATIENT)
Dept: UROLOGY | Facility: CLINIC | Age: 78
End: 2023-02-07
Payer: MEDICARE

## 2023-02-07 ENCOUNTER — MEDICAL CORRESPONDENCE (OUTPATIENT)
Dept: HEALTH INFORMATION MANAGEMENT | Facility: CLINIC | Age: 78
End: 2023-02-07

## 2023-02-07 VITALS — WEIGHT: 152 LBS | BODY MASS INDEX: 21.76 KG/M2 | HEIGHT: 70 IN

## 2023-02-07 DIAGNOSIS — N40.1 BENIGN PROSTATIC HYPERPLASIA WITH INCOMPLETE BLADDER EMPTYING: Primary | ICD-10-CM

## 2023-02-07 DIAGNOSIS — R39.14 BENIGN PROSTATIC HYPERPLASIA WITH INCOMPLETE BLADDER EMPTYING: Primary | ICD-10-CM

## 2023-02-07 PROCEDURE — 99203 OFFICE O/P NEW LOW 30 MIN: CPT | Mod: 25 | Performed by: UROLOGY

## 2023-02-07 PROCEDURE — 51798 US URINE CAPACITY MEASURE: CPT | Performed by: UROLOGY

## 2023-02-07 RX ORDER — AZITHROMYCIN 500 MG/1
1 TABLET, FILM COATED ORAL
COMMUNITY
Start: 2022-02-21 | End: 2023-03-07

## 2023-02-07 ASSESSMENT — PAIN SCALES - GENERAL: PAINLEVEL: NO PAIN (0)

## 2023-02-07 NOTE — NURSING NOTE
"Chief Complaint   Patient presents with     Follow Up     ED       Height 1.778 m (5' 10\"), weight 68.9 kg (152 lb). Body mass index is 21.81 kg/m .    Patient Active Problem List   Diagnosis     Essential hypertension, benign     Pain in joint, upper arm     Allergic rhinitis     Pain in joint, lower leg     Chronic airway obstruction (H)     Monoclonal paraproteinemia     Immunodeficiency (H)     Eczema     COPD (chronic obstructive pulmonary disease) (H)     Transient cerebral ischemia     Bunion     Occipital neuralgia     Hyperlipidemia LDL goal <100     Health Care Home     Low back pain     Advanced directives, counseling/discussion     Olecranon bursitis of right elbow     Bruit     Retina hole     signed & scanned on 09/23/2011  (1-4-2013 printed but not scanned in)      Chronic, continuous use of opioids     Atherosclerosis of native coronary artery of native heart without angina pectoris     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, unspecified thyrotoxicosis type     Right-sided low back pain with right-sided sciatica     Coronary artery disease involving native coronary artery of native heart without angina pectoris     BRENNEN (obstructive sleep apnea)     Rheumatoid arthritis (H)     Hammer toe of right foot     Hallux limitus of right foot     Corn of toe     Non-recurrent unilateral inguinal hernia with obstruction without gangrene     Left inguinal hernia     Metastasis from thyroid cancer (H)     Nodule of upper lobe of right lung     Preoperative examination     Chronic pain disorder     Closed fracture of one rib     Acute on chronic respiratory failure with hypoxia (H)     Closed fracture of one rib of left side, initial encounter     Dehydration     Shock (H)     CALLUM (acute kidney injury) (H)     COPD, very severe (H)     Acute kidney failure with tubular necrosis (H)     Hypokalemia       Allergies   Allergen Reactions     Levaquin Difficulty breathing     Plavix [Clopidogrel Bisulfate] Itching     " Atorvastatin Calcium Cramps     lipitor     Cats      Dogs      Hctz [Hydrochlorothiazide]      Rash on legs     Sulfasalazine Other (See Comments)     Stomach cramps        Current Outpatient Medications   Medication Sig Dispense Refill     acetaminophen (TYLENOL) 325 MG tablet Take 3 tablets (975 mg) by mouth 3 times daily 60 tablet 1     albuterol (ACCUNEB) 1.25 MG/3ML neb solution Take 1 vial (1.25 mg) by nebulization every 4 hours as needed for shortness of breath or wheezing 90 mL 4     albuterol (PROAIR HFA/PROVENTIL HFA/VENTOLIN HFA) 108 (90 Base) MCG/ACT inhaler INHALE 2 PUFFS BY MOUTH EVERY 6 HOURS AS NEEDED FOR WHEEZE OR FOR SHORTNESS OF BREATH 18 g 3     amLODIPine (NORVASC) 5 MG tablet Take 5 mg by mouth daily Taking AM       ascorbic acid 1000 MG TABS tablet Take 1,000 mg by mouth daily       aspirin 81 MG tablet Take 1 tablet by mouth 2 times daily       azithromycin (ZITHROMAX) 500 MG tablet Take 1 tablet by mouth daily at 2 pm       calcium carbonate (OS-KARLIE) 1500 (600 Ca) MG tablet Take 600 mg by mouth 2 times daily (with meals)       cholecalciferol 25 MCG (1000 UT) TABS Take 1,000 Units by mouth daily        DULoxetine (CYMBALTA) 20 MG capsule Take 1 capsule (20 mg) by mouth 2 times daily 180 capsule 3     Fluticasone-Umeclidin-Vilanterol (TRELEGY ELLIPTA) 100-62.5-25 MCG/INH oral inhaler Inhale 1 puff into the lungs daily 90 each 3     furosemide (LASIX) 20 MG tablet Take 1 tablet (20 mg) by mouth 2 times daily In morning and at noon       guaiFENesin (MUCINEX) 600 MG 12 hr tablet Take 600 mg by mouth 2 times daily as needed for congestion       hydrOXYzine (ATARAX) 10 MG tablet Take 1 tablet (10 mg) by mouth 3 times daily as needed for itching 90 tablet 3     levothyroxine (SYNTHROID/LEVOTHROID) 137 MCG tablet Take 1 tablet (137 mcg) by mouth daily , starting on 9/23/21 90 tablet 1     MegaRed Omega-3 Krill Oil 500 MG CAPS        Melatonin 10 MG TABS tablet Take 1 tablet (10 mg) by mouth  nightly as needed for sleep       metoprolol succinate ER (TOPROL XL) 50 MG 24 hr tablet Take 1 tablet (50 mg) by mouth daily Takes in AM       multivitamin w/minerals (THERA-VIT-M) tablet Take 1 tablet by mouth daily        naloxone (NARCAN) 4 MG/0.1ML nasal spray Spray 1 spray (4 mg) into one nostril alternating nostrils once as needed for opioid reversal every 2-3 minutes until assistance arrives 0.2 mL 3     nitroGLYcerin (NITROSTAT) 0.4 MG sublingual tablet FOR CHEST PAIN PLACE 1 TAB UNDER TONGUE EVERY 5 MIN FOR 3 DOSES. IF SYMPTOMS PERSIST 5 MIN AFTER 1ST DOSE CALL 911 25 tablet 3     oxyCODONE (ROXICODONE) 5 MG tablet Take 1 tablet (5 mg) by mouth 3 times daily as needed for pain 75 tablet 0     potassium chloride ER (KLOR-CON M) 20 MEQ CR tablet Take 1 tablet (20 mEq) by mouth 2 times daily 180 tablet 3     predniSONE (DELTASONE) 20 MG tablet Take 3 tabs by mouth daily x 3 days, then 2 tabs daily x 3 days, then 1 tab daily x 3 days, then 1/2 tab daily x 3 days. 20 tablet 0     rosuvastatin (CRESTOR) 10 MG tablet TAKE 1 TABLET BY MOUTH EVERY DAY 90 tablet 3     senna-docusate (SENOKOT-S/PERICOLACE) 8.6-50 MG tablet Take 1 tablet by mouth 2 times daily as needed for constipation         Social History     Tobacco Use     Smoking status: Former     Packs/day: 1.50     Years: 30.00     Pack years: 45.00     Types: Cigarettes     Start date: 1996     Quit date: 1999     Years since quittin.1     Smokeless tobacco: Never     Tobacco comments:     not  a smoker   Substance Use Topics     Alcohol use: Yes     Comment: 3 drinks month     Drug use: No       Mely Jaffe  2023  1:44 PM

## 2023-02-07 NOTE — LETTER
2/7/2023       RE: Harrison Thomas  [address] [address]     Dear Colleague,    Thank you for referring your patient, Harrison Thomas, to the [facility] [place] at [facility]. Please see a copy of my visit note below.          UROLOGY OUTPATIENT VISIT      Chief Complaint:   Urinary Retention      Synopsis    Harrison Thomas is a very pleasant AGE: 77 year old year old person    He has a history of urinary retention and underwent HoLEP in 2019.  Last seen by me 6/2019 at which time was going well.  PAthology was benign.     Seen now after recent prolonged hospitalization for pneumonia.  He was severely ill, making progress in recovery but still on supplemental O2 and with funcitonal limitations.    From a voiding perspective he was found to be incidentally on retention during admission.  Prior to this (and now) he was not having and urinary concerns.     PVR today is 65 mL         Medications     Current Outpatient Medications   Medication     acetaminophen (TYLENOL) 325 MG tablet     albuterol (ACCUNEB) 1.25 MG/3ML neb solution     albuterol (PROAIR HFA/PROVENTIL HFA/VENTOLIN HFA) 108 (90 Base) MCG/ACT inhaler     amLODIPine (NORVASC) 5 MG tablet     ascorbic acid 1000 MG TABS tablet     aspirin 81 MG tablet     azithromycin (ZITHROMAX) 250 MG tablet     azithromycin (ZITHROMAX) 500 MG tablet     calcium carbonate (OS-KARLIE) 1500 (600 Ca) MG tablet     cholecalciferol 25 MCG (1000 UT) TABS     DULoxetine (CYMBALTA) 20 MG capsule     Fluticasone-Umeclidin-Vilanterol (TRELEGY ELLIPTA) 100-62.5-25 MCG/INH oral inhaler     furosemide (LASIX) 20 MG tablet     guaiFENesin (MUCINEX) 600 MG 12 hr tablet     hydrOXYzine (ATARAX) 10 MG tablet     levothyroxine (SYNTHROID/LEVOTHROID) 137 MCG tablet     lisinopril (ZESTRIL) 40 MG tablet     MegaRed Omega-3 Krill Oil 500 MG CAPS     Melatonin 10 MG TABS tablet     metoprolol  succinate ER (TOPROL XL) 50 MG 24 hr tablet     multivitamin w/minerals (THERA-VIT-M) tablet     naloxone (NARCAN) 4 MG/0.1ML nasal spray     nitroGLYcerin (NITROSTAT) 0.4 MG sublingual tablet     oxyCODONE (ROXICODONE) 5 MG tablet     potassium chloride ER (KLOR-CON M) 20 MEQ CR tablet     predniSONE (DELTASONE) 20 MG tablet     rosuvastatin (CRESTOR) 10 MG tablet     senna-docusate (SENOKOT-S/PERICOLACE) 8.6-50 MG tablet     No current facility-administered medications for this visit.         The following  distinct labs were reviewed    I personally reviewed all applicable laboratory data and went over findings with patient  Significant for:    CBC RESULTS:  Recent Labs   Lab Test 02/09/23  1225 01/23/23  1124 01/20/23  1024 01/19/23  1000   WBC 7.3 11.2* 12.6* 12.9*   HGB 12.2* 13.4 11.8* 11.1*   * 186 141* 119*        BMP RESULTS:  Recent Labs   Lab Test 02/09/23  1225 01/23/23  1124 01/21/23  1102 01/20/23  1024 01/19/23  2104 01/18/23  0946 01/18/23  0832 07/20/21  0759 07/02/21  0712 06/25/21 2122 06/25/21 2120 06/22/21  0639 06/03/21  0936    140  --  141  --   --  139   < > 142  --  139  --  142   POTASSIUM 4.4 3.9 3.3* 3.3*  --    < > 3.7   < > 3.9  --  4.1   < > 4.5   CHLORIDE 102 102  --  102  --   --  104   < > 109  --  104  --  109   CO2 25 24  --  30*  --   --  21*   < > 29  --  30  --  29   ANIONGAP 11 14  --  9  --   --  14   < > 4  --  5  --  4   GLC 76 92  --  86 130*   < > 56*   < > 94  --  134*  --  87   BUN 18.6 5.2*  --  3.4*  --   --  6.1*   < > 16  --  20  --  11   CR 0.80 0.75  --  0.74  --   --  0.69   < > 0.89  --  1.00  --  0.85   GFRESTIMATED >90 >90  --  >90  --   --  >90   < > 84 66 74  --  86   GFRESTBLACK  --   --   --   --   --   --   --   --  >90 79 86  --  >90    < > = values in this interval not displayed.       CALCIUM RESULTS:  Recent Labs   Lab Test 02/09/23  1225 01/23/23  1124 01/20/23  1024 01/18/23  0832   KARLIE 9.1 8.9 8.3* 7.9*       PTH RESULTS:  Recent  Labs   Lab Test 08/27/19  1415   PTHI 35       HGB A1C RESULTS:  Lab Results   Component Value Date    A1C 5.7 11/15/2022    A1C 5.1 11/01/2021       UA RESULTS:   Recent Labs   Lab Test 01/15/23  1126 04/05/19  1225 04/01/19  1124   SG 1.020 1.015 1.010   URINEPH 5.5 7.5* 7.0   NITRITE Negative Positive* Positive*   RBCU 14* O - 2 O - 2   WBCU 0 5-10* 0 - 5       PSA RESULTS  PSA   Date Value Ref Range Status   03/08/2021 0.46 0 - 4 ug/L Final     Comment:     Assay Method:  Chemiluminescence using Siemens Vista analyzer   06/04/2019 0.42 0 - 4 ug/L Final     Comment:     Assay Method:  Chemiluminescence using Siemens Vista analyzer   03/26/2019 5.00 (H) 0 - 4 ug/L Final     Comment:     Assay Method:  Chemiluminescence using Siemens Vista analyzer   03/07/2019 11.00 (H) 0 - 4 ug/L Final     Comment:     Assay Method:  Chemiluminescence using Siemens Vista analyzer   05/07/2018 4.65 (H) 0 - 4 ug/L Final     Comment:     Assay Method:  Chemiluminescence using Siemens Vista analyzer   11/14/2016 3.90 0 - 4 ug/L Final     Comment:     Assay Method:  Chemiluminescence using Siemens Vista analyzer   05/28/2015 3.71 0 - 4 ug/L Final   05/23/2014 2.95 0 - 4 ug/L Final   05/13/2013 2.12 0 - 4 ug/L Final   05/14/2012 2.16 0 - 4 ug/L Final     Prostate Specific Antigen Screen   Date Value Ref Range Status   12/03/2022 0.48 0.00 - 6.50 ng/mL Final         *Note: Due to a large number of results and/or encounters for the requested time period, some results have not been displayed. A complete set of results can be found in Results Review.              Assessment/Plan   77 year old year old person with prior HoLEP, recent retention (ResolveD)  -Suspect recent retention related to multiorgan failure and failure to thrive.  He is emptying his bladder now that he is out of the hospital.  Can stop flomax, follow up PRN    CC:  Yaneli Dozier    22 minutes spent on clinical encounter including  Review of medical  records  Documentation  Coordinating follow-up medical care      Jerry Horvath MD

## 2023-02-07 NOTE — TELEPHONE ENCOUNTER
"Patient has a prescription for prednisone and azithromycin and doesn't know how to take it.  Patient states he feels something is \"brewing\".  He was instructed by Dr Dozier if he feels it is to take the azithromycin and prednisone.    Explained to patient:    Azithromycin: the first day take 2 tablets together equally 500mg, then for the next 4 days take 1 tablet each day.    Prednisone:  The first 3 days take all three pills at the same time with a full glass of water and food.    The next 3 days take 2 tablets at the same time with a full glass of water and with food    The next 3 days take 1 tablet with a full glass of water and with food    The last 3 days take a 1/2 tablet with a full water and food.    No further questions.    Carly Singleton RN   02/06/23 8:56 PM  [facility] Nurse Advisor      Reason for Disposition    Caller has medicine question only, adult not sick, AND triager answers question    Additional Information    Negative: [1] Intentional drug overdose AND [2] suicidal thoughts or ideas    Negative: Drug overdose and triager unable to answer question    Negative: Caller requesting a renewal or refill of a medicine patient is currently taking    Negative: Caller requesting information unrelated to medicine    Negative: Caller requesting information about COVID-19 Vaccine    Negative: Caller requesting information about Emergency Contraception    Negative: Caller requesting information about Combined Birth Control Pills    Negative: Caller requesting information about Progestin Birth Control Pills    Negative: Caller requesting information about Post-Op pain or medicines    Negative: Caller requesting a prescription antibiotic (such as Penicillin) for Strep throat and has a positive culture result    Negative: Caller requesting a prescription anti-viral med (such as Tamiflu) and has influenza (flu) symptoms    Negative: Immunization reaction suspected    Negative: Rash while taking a " medicine or within 3 days of stopping it    Negative: [1] Asthma and [2] having symptoms of asthma (cough, wheezing, etc.)    Negative: [1] Symptom of illness (e.g., headache, abdominal pain, earache, vomiting) AND [2] more than mild    Negative: Breastfeeding questions about mother's medicines and diet    Negative: MORE THAN A DOUBLE DOSE of a prescription or over-the-counter (OTC) drug    Negative: [1] DOUBLE DOSE (an extra dose or lesser amount) of prescription drug AND [2] any symptoms (e.g., dizziness, nausea, pain, sleepiness)    Negative: [1] DOUBLE DOSE (an extra dose or lesser amount) of over-the-counter (OTC) drug AND [2] any symptoms (e.g., dizziness, nausea, pain, sleepiness)    Negative: Took another person's prescription drug    Negative: [1] DOUBLE DOSE (an extra dose or lesser amount) of prescription drug AND [2] NO symptoms (Exception: a double dose of antibiotics)    Negative: Diabetes drug error or overdose (e.g., took wrong type of insulin or took extra dose)    Negative: [1] Prescription not at pharmacy AND [2] was prescribed by PCP recently (Exception: triager has access to EMR and prescription is recorded there. Go to Home Care and confirm for pharmacy.)    Negative: [1] Pharmacy calling with prescription question AND [2] triager unable to answer question    Negative: [1] Caller has URGENT medicine question about med that PCP or specialist prescribed AND [2] triager unable to answer question    Negative: Medicine patch causing local rash or itching    Negative: [1] Caller has medicine question about med NOT prescribed by PCP AND [2] triager unable to answer question (e.g., compatibility with other med, storage)    Negative: Prescription request for new medicine (not a refill)    Negative: [1] Caller has NON-URGENT medicine question about med that PCP prescribed AND [2] triager unable to answer question    Negative: Caller wants to use a complementary or alternative medicine    Negative: [1]  Prescription prescribed recently is not at pharmacy AND [2] triager has access to patient's EMR AND [3] prescription is recorded in the EMR    Negative: [1] DOUBLE DOSE (an extra dose or lesser amount) of over-the-counter (OTC) drug AND [2] NO symptoms    Negative: [1] DOUBLE DOSE (an extra dose or lesser amount) of antibiotic drug AND [2] NO symptoms    Protocols used: MEDICATION QUESTION CALL-A-AH

## 2023-02-08 ENCOUNTER — OFFICE VISIT (OUTPATIENT)
Dept: RHEUMATOLOGY | Facility: CLINIC | Age: 78
End: 2023-02-08
Payer: MEDICARE

## 2023-02-08 VITALS
HEART RATE: 60 BPM | RESPIRATION RATE: 16 BRPM | BODY MASS INDEX: 21.81 KG/M2 | DIASTOLIC BLOOD PRESSURE: 69 MMHG | WEIGHT: 152 LBS | OXYGEN SATURATION: 95 % | SYSTOLIC BLOOD PRESSURE: 120 MMHG

## 2023-02-08 DIAGNOSIS — M17.11 PRIMARY OSTEOARTHRITIS OF RIGHT KNEE: ICD-10-CM

## 2023-02-08 DIAGNOSIS — M05.79 RHEUMATOID ARTHRITIS INVOLVING MULTIPLE SITES WITH POSITIVE RHEUMATOID FACTOR (H): Primary | ICD-10-CM

## 2023-02-08 PROCEDURE — 99213 OFFICE O/P EST LOW 20 MIN: CPT | Performed by: INTERNAL MEDICINE

## 2023-02-08 ASSESSMENT — PAIN SCALES - GENERAL: PAINLEVEL: SEVERE PAIN (6)

## 2023-02-08 NOTE — NURSING NOTE
RAPID3 (0-30) Cumulative Score  11.8          RAPID3 Weighted Score (divide #4 by 3 and that is the weighted score)  3.9
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
## 2023-02-09 ENCOUNTER — MYC MEDICAL ADVICE (OUTPATIENT)
Dept: FAMILY MEDICINE | Facility: CLINIC | Age: 78
End: 2023-02-09
Payer: MEDICARE

## 2023-02-09 ENCOUNTER — TELEPHONE (OUTPATIENT)
Dept: FAMILY MEDICINE | Facility: CLINIC | Age: 78
End: 2023-02-09

## 2023-02-09 ENCOUNTER — NURSE TRIAGE (OUTPATIENT)
Dept: FAMILY MEDICINE | Facility: CLINIC | Age: 78
End: 2023-02-09

## 2023-02-09 ENCOUNTER — LAB REQUISITION (OUTPATIENT)
Dept: LAB | Facility: CLINIC | Age: 78
End: 2023-02-09
Payer: MEDICARE

## 2023-02-09 DIAGNOSIS — E86.0 DEHYDRATION: ICD-10-CM

## 2023-02-09 DIAGNOSIS — J18.9 PNEUMONIA, UNSPECIFIED ORGANISM: ICD-10-CM

## 2023-02-09 DIAGNOSIS — J44.9 COPD, VERY SEVERE (H): ICD-10-CM

## 2023-02-09 DIAGNOSIS — J96.21 ACUTE AND CHRONIC RESPIRATORY FAILURE WITH HYPOXIA (H): ICD-10-CM

## 2023-02-09 DIAGNOSIS — N17.9 ACUTE KIDNEY FAILURE, UNSPECIFIED (H): ICD-10-CM

## 2023-02-09 LAB
ANION GAP SERPL CALCULATED.3IONS-SCNC: 11 MMOL/L (ref 7–15)
BASOPHILS # BLD AUTO: 0 10E3/UL (ref 0–0.2)
BASOPHILS NFR BLD AUTO: 0 %
BUN SERPL-MCNC: 18.6 MG/DL (ref 8–23)
CALCIUM SERPL-MCNC: 9.1 MG/DL (ref 8.8–10.2)
CHLORIDE SERPL-SCNC: 102 MMOL/L (ref 98–107)
CREAT SERPL-MCNC: 0.8 MG/DL (ref 0.67–1.17)
CRP SERPL-MCNC: 20.53 MG/L
DEPRECATED HCO3 PLAS-SCNC: 25 MMOL/L (ref 22–29)
EOSINOPHIL # BLD AUTO: 0.2 10E3/UL (ref 0–0.7)
EOSINOPHIL NFR BLD AUTO: 3 %
ERYTHROCYTE [DISTWIDTH] IN BLOOD BY AUTOMATED COUNT: 15.9 % (ref 10–15)
GFR SERPL CREATININE-BSD FRML MDRD: >90 ML/MIN/1.73M2
GLUCOSE SERPL-MCNC: 76 MG/DL (ref 70–99)
HCT VFR BLD AUTO: 37.7 % (ref 40–53)
HGB BLD-MCNC: 12.2 G/DL (ref 13.3–17.7)
HOLD SPECIMEN: NORMAL
IMM GRANULOCYTES # BLD: 0 10E3/UL
IMM GRANULOCYTES NFR BLD: 0 %
LYMPHOCYTES # BLD AUTO: 1.1 10E3/UL (ref 0.8–5.3)
LYMPHOCYTES NFR BLD AUTO: 15 %
MCH RBC QN AUTO: 29.6 PG (ref 26.5–33)
MCHC RBC AUTO-ENTMCNC: 32.4 G/DL (ref 31.5–36.5)
MCV RBC AUTO: 92 FL (ref 78–100)
MONOCYTES # BLD AUTO: 1 10E3/UL (ref 0–1.3)
MONOCYTES NFR BLD AUTO: 14 %
NEUTROPHILS # BLD AUTO: 4.9 10E3/UL (ref 1.6–8.3)
NEUTROPHILS NFR BLD AUTO: 68 %
NRBC # BLD AUTO: 0 10E3/UL
NRBC BLD AUTO-RTO: 0 /100
PLATELET # BLD AUTO: 100 10E3/UL (ref 150–450)
POTASSIUM SERPL-SCNC: 4.4 MMOL/L (ref 3.4–5.3)
RBC # BLD AUTO: 4.12 10E6/UL (ref 4.4–5.9)
SODIUM SERPL-SCNC: 138 MMOL/L (ref 136–145)
WBC # BLD AUTO: 7.3 10E3/UL (ref 4–11)

## 2023-02-09 PROCEDURE — 85025 COMPLETE CBC W/AUTO DIFF WBC: CPT | Mod: ORL | Performed by: INTERNAL MEDICINE

## 2023-02-09 PROCEDURE — 80048 BASIC METABOLIC PNL TOTAL CA: CPT | Mod: ORL | Performed by: INTERNAL MEDICINE

## 2023-02-09 PROCEDURE — 86140 C-REACTIVE PROTEIN: CPT | Mod: ORL | Performed by: INTERNAL MEDICINE

## 2023-02-09 RX ORDER — AZITHROMYCIN 250 MG/1
TABLET, FILM COATED ORAL
Qty: 6 TABLET | Refills: 0 | Status: SHIPPED | OUTPATIENT
Start: 2023-02-09 | End: 2023-02-14

## 2023-02-09 RX ORDER — PREDNISONE 20 MG/1
TABLET ORAL
Qty: 20 TABLET | Refills: 0 | Status: SHIPPED | OUTPATIENT
Start: 2023-02-09 | End: 2023-03-07

## 2023-02-09 NOTE — TELEPHONE ENCOUNTER
See triage notes     Zaria HAYS, Triage RN  [facility]

## 2023-02-09 NOTE — TELEPHONE ENCOUNTER
Called to triage patient's breathing concerns:     [employer] message:   Prednisone &  Azithromycin / Possible Pneumonia  2/9/2023 9:30 AM Reply    To: [facility] TRIAGE IM      From: Harrison Thomas      Created: 2/9/2023 9:30 AM        *-*-*This message has not been handled.*-*-*    Leonel Dozier,   When I came home from hospital on 1/22/23 I did not need oxygen for short walks and light exercise and my oxygen levels stayed in the low 90's without oxygen. On 2/4/23 I started feeling poorly and would get winded and my oxygen level would drop to the low 80's doing 10 steps (without oxygen).     I started taking Prednisone &  Azithromycin on 2/6/23 because I was worried I might have pneumonia or an infection starting up.  I did start feeling better while resting the next day, but, my oxygen levels still continue to drop to the low 80's with very light activity, e.g. 10 steps, without oxygen on.      I will finish the Azithromycin at dinner time on 2/10/23.       I will finish the Prednisone on 2/17/23. (Dosage: 60mg for 3 days, 40mg for 3 days, 20 mg for 3 days, 10 mg for 3 days.)     Should I get checked for pneumonia?      Could you please refill my prescriptions for the Prednisone &  Azithromycin so I will have some on hand for future use if needed?       Thank you, Harrison          BACKGROUND : Pt was hospitalized 1/15/23 - 1/22/23 for multiple concerns (including severe COPD, healthcare associated pneumonia, respiratory failure, CAD, and other concerns). Had hospital follow up with PCP 1/23/23    Spoke with patient   On Prednisone and Z-Juan now - seems to be helping   Prior to starting these: walking 15 feet would cause him to handy and puff, O2 dropped to low 80s even 79 once   After starting abx and prednisone got back to baseline, except cannot be without oxygen. Prior to that would eat without O2 and would stay in low 90s, can't walk 20 ft without dropping into 80s     Unsure if pneumonia is fully resolved. Will run out  of medication after tomorrow (abx). Has enough prednisone for a couple of days.      O2 now: wearing is 95% at rest - 1 L/min   Will drop low 80s walking with it off     At rest is okay, took O2 off at rest and stayed at 95% rest room air     Wears O2 all night     Went to rheumatology specialist yesterday- listened to lung sounds yesterday, couldn't hear wheezing or crackling and lungs sounded okay     No fever, not coughing never really had a cough     Monday night started abx/prednisone - by Tuesday felt better. Thinks there was an infection but not fully resolved. Cannot walk around without wearing O2, was able to do so when he was discharged from hospital.     Does not want to come in as he is feeling better on the abx and prednisone - states he is okay with waiting for PCP to address tomorrow if needed. But his request is for another course of Z-lisha and prednisone to have on hand in case he needs to take it again/if symptoms worsen     Zaria HAYS, Triage RN  [facility]     Additional Information    Negative: SEVERE difficulty breathing (e.g., struggling for each breath, speaks in single words, pulse > 120)    Negative: Breathing stopped and hasn't returned    Negative: Choking on something    Negative: Bluish (or gray) lips or face    Negative: Difficult to awaken or acting confused (e.g., disoriented, slurred speech)    Negative: Passed out (i.e., fainted, collapsed and was not responding)    Negative: Wheezing started suddenly after medicine, an allergic food, or bee sting    Negative: Stridor    Negative: Slow, shallow and weak breathing    Negative: Sounds like a life-threatening emergency to the triager    Negative: Chest pain    Negative: Wheezing (high pitched whistling sound) and previous asthma attacks or use of asthma medicines    Negative: Difficulty breathing and within 14 days of COVID-19 Exposure    Negative: Difficulty breathing and only present when  "coughing    Negative: Difficulty breathing and only from stuffy nose    Negative: Difficulty breathing and only from stuffy nose or runny nose from common cold    Negative: MODERATE difficulty breathing (e.g., speaks in phrases, SOB even at rest, pulse 100-120) of new-onset or worse than normal    Negative: Oxygen level (e.g., pulse oximetry) 90 percent or lower    Negative: Wheezing can be heard across the room    Negative: Drooling or spitting out saliva (because can't swallow)    Negative: Any history of prior \"blood clot\" in leg or lungs    Negative: Illness requiring prolonged bedrest in past month (e.g., immobilization, long hospital stay)    Negative: Hip or leg fracture (broken bone) in past month (or had cast on leg or ankle in past month)    Negative: Major surgery in the past month    Negative: Long-distance travel in past month (e.g., car, bus, train, plane; with trip lasting 6 or more hours)    Negative: Cancer treatment in past six months (or has cancer now)    Negative: Extra heart beats OR irregular heart beating (i.e., \"palpitations\")    Negative: Fever > 103 F (39.4 C)    Negative: Fever > 101 F (38.3 C) and over 60 years of age    Negative: Fever > 100.0 F (37.8 C) and bedridden (e.g., nursing home patient, stroke, chronic illness, recovering from surgery)    Negative: Fever > 100.0 F (37.8 C) and diabetes mellitus or weak immune system (e.g., HIV positive, cancer chemo, splenectomy, organ transplant, chronic steroids)    Negative: Periods where breathing stops and then resumes normally and bedridden (e.g., nursing home patient, CVA)    Negative: Pregnant or postpartum (from 0 to 6 weeks after delivery)    Negative: Patient sounds very sick or weak to the triager    Negative: MILD difficulty breathing (e.g., minimal/no SOB at rest, SOB with walking, pulse < 100) of new-onset or worse than normal    Negative: Longstanding difficulty breathing (e.g., CHF, COPD, emphysema) and worse than normal    " Negative: Longstanding difficulty breathing and not responding to usual therapy    Negative: Continuous (nonstop) coughing    Negative: Patient wants to be seen    Negative: Oxygen level (e.g., pulse oximetry) 91 to 94 percent    Protocols used: BREATHING DIFFICULTY-A-OH

## 2023-02-09 NOTE — TELEPHONE ENCOUNTER
Signed scripts.     If O2 is going into the low 80s I do recommend ER.  Will also leave for PCP to review    PREETI Pérez CNP

## 2023-02-09 NOTE — TELEPHONE ENCOUNTER
Daya LPN calling to update Dr. Dozier. Daya did see the patient today. Did blood work. Reports lung sounds diminished on the right. Left side had crackles all over. Not spitting anything up. Patient always cough, but cough in NP.    See triage note below as well. Does the patient need additional prescriptions?    Daya saw the patient at 1 pm today and denies change to symptoms noted below.     Silvia Durham RN

## 2023-02-10 DIAGNOSIS — I10 BENIGN ESSENTIAL HYPERTENSION: ICD-10-CM

## 2023-02-10 RX ORDER — LISINOPRIL 40 MG/1
TABLET ORAL
Qty: 90 TABLET | Refills: 0 | Status: SHIPPED | OUTPATIENT
Start: 2023-02-10 | End: 2023-03-20

## 2023-02-10 NOTE — TELEPHONE ENCOUNTER
Called WOLF Stapleton regarding CNP recommendations below. She understood and will call patient and let them know.     Ora Joe RN on 2/9/2023 at 6:02 PM

## 2023-02-13 ENCOUNTER — MEDICAL CORRESPONDENCE (OUTPATIENT)
Dept: HEALTH INFORMATION MANAGEMENT | Facility: CLINIC | Age: 78
End: 2023-02-13

## 2023-02-14 NOTE — PROGRESS NOTES
UROLOGY OUTPATIENT VISIT      Chief Complaint:   Urinary Retention      Synopsis    Harrison Thomas is a very pleasant AGE: 77 year old year old person    He has a history of urinary retention and underwent HoLEP in 2019.  Last seen by me 6/2019 at which time was going well.  PAthology was benign.     Seen now after recent prolonged hospitalization for pneumonia.  He was severely ill, making progress in recovery but still on supplemental O2 and with funcitonal limitations.    From a voiding perspective he was found to be incidentally on retention during admission.  Prior to this (and now) he was not having and urinary concerns.     PVR today is 65 mL         Medications     Current Outpatient Medications   Medication     acetaminophen (TYLENOL) 325 MG tablet     albuterol (ACCUNEB) 1.25 MG/3ML neb solution     albuterol (PROAIR HFA/PROVENTIL HFA/VENTOLIN HFA) 108 (90 Base) MCG/ACT inhaler     amLODIPine (NORVASC) 5 MG tablet     ascorbic acid 1000 MG TABS tablet     aspirin 81 MG tablet     azithromycin (ZITHROMAX) 250 MG tablet     azithromycin (ZITHROMAX) 500 MG tablet     calcium carbonate (OS-KARLIE) 1500 (600 Ca) MG tablet     cholecalciferol 25 MCG (1000 UT) TABS     DULoxetine (CYMBALTA) 20 MG capsule     Fluticasone-Umeclidin-Vilanterol (TRELEGY ELLIPTA) 100-62.5-25 MCG/INH oral inhaler     furosemide (LASIX) 20 MG tablet     guaiFENesin (MUCINEX) 600 MG 12 hr tablet     hydrOXYzine (ATARAX) 10 MG tablet     levothyroxine (SYNTHROID/LEVOTHROID) 137 MCG tablet     lisinopril (ZESTRIL) 40 MG tablet     MegaRed Omega-3 Krill Oil 500 MG CAPS     Melatonin 10 MG TABS tablet     metoprolol succinate ER (TOPROL XL) 50 MG 24 hr tablet     multivitamin w/minerals (THERA-VIT-M) tablet     naloxone (NARCAN) 4 MG/0.1ML nasal spray     nitroGLYcerin (NITROSTAT) 0.4 MG sublingual tablet     oxyCODONE (ROXICODONE) 5 MG tablet     potassium chloride ER (KLOR-CON M) 20 MEQ CR tablet     predniSONE (DELTASONE) 20 MG  tablet     rosuvastatin (CRESTOR) 10 MG tablet     senna-docusate (SENOKOT-S/PERICOLACE) 8.6-50 MG tablet     No current facility-administered medications for this visit.         The following  distinct labs were reviewed    I personally reviewed all applicable laboratory data and went over findings with patient  Significant for:    CBC RESULTS:  Recent Labs   Lab Test 02/09/23  1225 01/23/23  1124 01/20/23  1024 01/19/23  1000   WBC 7.3 11.2* 12.6* 12.9*   HGB 12.2* 13.4 11.8* 11.1*   * 186 141* 119*        BMP RESULTS:  Recent Labs   Lab Test 02/09/23  1225 01/23/23  1124 01/21/23  1102 01/20/23  1024 01/19/23  2104 01/18/23  0946 01/18/23  0832 07/20/21  0759 07/02/21  0712 06/25/21  2122 06/25/21 2120 06/22/21  0639 06/03/21  0936    140  --  141  --   --  139   < > 142  --  139  --  142   POTASSIUM 4.4 3.9 3.3* 3.3*  --    < > 3.7   < > 3.9  --  4.1   < > 4.5   CHLORIDE 102 102  --  102  --   --  104   < > 109  --  104  --  109   CO2 25 24  --  30*  --   --  21*   < > 29  --  30  --  29   ANIONGAP 11 14  --  9  --   --  14   < > 4  --  5  --  4   GLC 76 92  --  86 130*   < > 56*   < > 94  --  134*  --  87   BUN 18.6 5.2*  --  3.4*  --   --  6.1*   < > 16  --  20  --  11   CR 0.80 0.75  --  0.74  --   --  0.69   < > 0.89  --  1.00  --  0.85   GFRESTIMATED >90 >90  --  >90  --   --  >90   < > 84 66 74  --  86   GFRESTBLACK  --   --   --   --   --   --   --   --  >90 79 86  --  >90    < > = values in this interval not displayed.       CALCIUM RESULTS:  Recent Labs   Lab Test 02/09/23  1225 01/23/23  1124 01/20/23  1024 01/18/23  0832   KARLIE 9.1 8.9 8.3* 7.9*       PTH RESULTS:  Recent Labs   Lab Test 08/27/19  1415   PTHI 35       HGB A1C RESULTS:  Lab Results   Component Value Date    A1C 5.7 11/15/2022    A1C 5.1 11/01/2021       UA RESULTS:   Recent Labs   Lab Test 01/15/23  1126 04/05/19  1225 04/01/19  1124   SG 1.020 1.015 1.010   URINEPH 5.5 7.5* 7.0   NITRITE Negative Positive* Positive*    RBCU 14* O - 2 O - 2   WBCU 0 5-10* 0 - 5       PSA RESULTS  PSA   Date Value Ref Range Status   03/08/2021 0.46 0 - 4 ug/L Final     Comment:     Assay Method:  Chemiluminescence using Siemens Vista analyzer   06/04/2019 0.42 0 - 4 ug/L Final     Comment:     Assay Method:  Chemiluminescence using Siemens Vista analyzer   03/26/2019 5.00 (H) 0 - 4 ug/L Final     Comment:     Assay Method:  Chemiluminescence using Siemens Vista analyzer   03/07/2019 11.00 (H) 0 - 4 ug/L Final     Comment:     Assay Method:  Chemiluminescence using Siemens Vista analyzer   05/07/2018 4.65 (H) 0 - 4 ug/L Final     Comment:     Assay Method:  Chemiluminescence using Siemens Vista analyzer   11/14/2016 3.90 0 - 4 ug/L Final     Comment:     Assay Method:  Chemiluminescence using Siemens Vista analyzer   05/28/2015 3.71 0 - 4 ug/L Final   05/23/2014 2.95 0 - 4 ug/L Final   05/13/2013 2.12 0 - 4 ug/L Final   05/14/2012 2.16 0 - 4 ug/L Final     Prostate Specific Antigen Screen   Date Value Ref Range Status   12/03/2022 0.48 0.00 - 6.50 ng/mL Final         *Note: Due to a large number of results and/or encounters for the requested time period, some results have not been displayed. A complete set of results can be found in Results Review.              Assessment/Plan   77 year old year old person with prior HoLEP, recent retention ([facility])  -Suspect recent retention related to multiorgan failure and failure to thrive.  He is emptying his bladder now that he is out of the hospital.  Can stop flomax, follow up PRN    CC:  Yaneli Dozier    22 minutes spent on clinical encounter including  Review of medical records  Documentation  Coordinating follow-up medical care

## 2023-02-16 ENCOUNTER — TELEPHONE (OUTPATIENT)
Dept: FAMILY MEDICINE | Facility: CLINIC | Age: 78
End: 2023-02-16
Payer: MEDICARE

## 2023-02-16 NOTE — TELEPHONE ENCOUNTER
Home care ([facility]) called requesting orders:     Continued every other week for 8 weeks and three PRN nursing visits     Verbal given on requested orders     Zaria HAYS, Triage RN  [facility]

## 2023-02-21 ENCOUNTER — MEDICAL CORRESPONDENCE (OUTPATIENT)
Dept: HEALTH INFORMATION MANAGEMENT | Facility: CLINIC | Age: 78
End: 2023-02-21

## 2023-02-23 ENCOUNTER — MEDICAL CORRESPONDENCE (OUTPATIENT)
Dept: HEALTH INFORMATION MANAGEMENT | Facility: CLINIC | Age: 78
End: 2023-02-23

## 2023-02-27 DIAGNOSIS — Z53.9 DIAGNOSIS NOT YET DEFINED: Primary | ICD-10-CM

## 2023-02-27 PROCEDURE — G0180 MD CERTIFICATION HHA PATIENT: HCPCS | Performed by: INTERNAL MEDICINE

## 2023-03-01 ENCOUNTER — LAB (OUTPATIENT)
Dept: LAB | Facility: CLINIC | Age: 78
End: 2023-03-01
Payer: MEDICARE

## 2023-03-01 ENCOUNTER — TELEPHONE (OUTPATIENT)
Dept: FAMILY MEDICINE | Facility: CLINIC | Age: 78
End: 2023-03-01
Payer: MEDICARE

## 2023-03-01 LAB — HOLD SPECIMEN: NORMAL

## 2023-03-01 PROCEDURE — 84436 ASSAY OF TOTAL THYROXINE: CPT | Mod: ORL | Performed by: INTERNAL MEDICINE

## 2023-03-01 PROCEDURE — 84439 ASSAY OF FREE THYROXINE: CPT | Mod: ORL | Performed by: INTERNAL MEDICINE

## 2023-03-01 PROCEDURE — 84443 ASSAY THYROID STIM HORMONE: CPT | Mod: ORL | Performed by: INTERNAL MEDICINE

## 2023-03-01 NOTE — TELEPHONE ENCOUNTER
Home care nurse called wife to schedule home visit with patient today. Spouse said patient also needs lab drawn for endocrinology, would like home care to draw this.     ATTN: Rebekah  Fax: [phone]    Per chart review, Dr. Simmons placed orders to be drawn end of 2/2023. Lab orders faxed to [facility] as requested.

## 2023-03-02 ENCOUNTER — MEDICAL CORRESPONDENCE (OUTPATIENT)
Dept: HEALTH INFORMATION MANAGEMENT | Facility: CLINIC | Age: 78
End: 2023-03-02

## 2023-03-02 DIAGNOSIS — C73 METASTASIS FROM THYROID CANCER (H): ICD-10-CM

## 2023-03-02 DIAGNOSIS — C79.9 METASTASIS FROM THYROID CANCER (H): ICD-10-CM

## 2023-03-02 RX ORDER — OXYCODONE HYDROCHLORIDE 5 MG/1
5 TABLET ORAL 3 TIMES DAILY PRN
Qty: 90 TABLET | Refills: 0 | Status: SHIPPED | OUTPATIENT
Start: 2023-03-02 | End: 2023-04-05

## 2023-03-03 ENCOUNTER — MEDICAL CORRESPONDENCE (OUTPATIENT)
Dept: HEALTH INFORMATION MANAGEMENT | Facility: CLINIC | Age: 78
End: 2023-03-03

## 2023-03-05 DIAGNOSIS — E89.0 POSTSURGICAL HYPOTHYROIDISM: ICD-10-CM

## 2023-03-05 RX ORDER — LEVOTHYROXINE SODIUM 137 UG/1
137 TABLET ORAL DAILY
Qty: 90 TABLET | Refills: 3 | Status: SHIPPED | OUTPATIENT
Start: 2023-03-05 | End: 2023-09-24

## 2023-03-08 ENCOUNTER — OFFICE VISIT (OUTPATIENT)
Dept: ENDOCRINOLOGY | Facility: CLINIC | Age: 78
End: 2023-03-08
Payer: MEDICARE

## 2023-03-08 VITALS
DIASTOLIC BLOOD PRESSURE: 71 MMHG | WEIGHT: 162.6 LBS | HEART RATE: 65 BPM | SYSTOLIC BLOOD PRESSURE: 124 MMHG | BODY MASS INDEX: 23.28 KG/M2 | HEIGHT: 70 IN

## 2023-03-08 DIAGNOSIS — E89.0 POSTSURGICAL HYPOTHYROIDISM: Primary | ICD-10-CM

## 2023-03-08 DIAGNOSIS — C73 PAPILLARY THYROID CARCINOMA (H): ICD-10-CM

## 2023-03-08 PROCEDURE — 99214 OFFICE O/P EST MOD 30 MIN: CPT | Performed by: INTERNAL MEDICINE
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
## 2023-03-17 ASSESSMENT — ENCOUNTER SYMPTOMS
ORTHOPNEA: 1
NECK PAIN: 0
NUMBNESS: 0
SLEEP DISTURBANCES DUE TO BREATHING: 1
SPUTUM PRODUCTION: 1
TREMORS: 0
PANIC: 0
HYPOTENSION: 0
NERVOUS/ANXIOUS: 1
SYNCOPE: 0
TINGLING: 0
SHORTNESS OF BREATH: 1
SWOLLEN GLANDS: 0
HEADACHES: 0
EYE PAIN: 0
DISTURBANCES IN COORDINATION: 1
EYE REDNESS: 0
COUGH DISTURBING SLEEP: 0
EYE IRRITATION: 0
STIFFNESS: 1
BACK PAIN: 1
BRUISES/BLEEDS EASILY: 1
DIZZINESS: 1
WEAKNESS: 1
PARALYSIS: 0
MUSCLE CRAMPS: 0
SEIZURES: 0
ARTHRALGIAS: 1
LOSS OF CONSCIOUSNESS: 0
EYE WATERING: 1
DOUBLE VISION: 0
MYALGIAS: 1
MUSCLE WEAKNESS: 1
WHEEZING: 0
HYPERTENSION: 1
EXERCISE INTOLERANCE: 1
LEG PAIN: 1
HEMOPTYSIS: 0
PALPITATIONS: 0
DEPRESSION: 0
DECREASED CONCENTRATION: 0
INSOMNIA: 1
SNORES LOUDLY: 1
MEMORY LOSS: 0
JOINT SWELLING: 0
POSTURAL DYSPNEA: 1
DYSPNEA ON EXERTION: 1
LIGHT-HEADEDNESS: 1
SPEECH CHANGE: 0
COUGH: 0

## 2023-03-20 ENCOUNTER — OFFICE VISIT (OUTPATIENT)
Dept: FAMILY MEDICINE | Facility: CLINIC | Age: 78
End: 2023-03-20
Payer: MEDICARE

## 2023-03-20 VITALS
HEIGHT: 70 IN | HEART RATE: 63 BPM | RESPIRATION RATE: 18 BRPM | OXYGEN SATURATION: 93 % | BODY MASS INDEX: 23.19 KG/M2 | TEMPERATURE: 98 F | SYSTOLIC BLOOD PRESSURE: 134 MMHG | DIASTOLIC BLOOD PRESSURE: 61 MMHG | WEIGHT: 162 LBS

## 2023-03-20 DIAGNOSIS — G89.29 CHRONIC BILATERAL LOW BACK PAIN WITH RIGHT-SIDED SCIATICA: ICD-10-CM

## 2023-03-20 DIAGNOSIS — R53.82 CHRONIC FATIGUE: ICD-10-CM

## 2023-03-20 DIAGNOSIS — R79.9 ABNORMAL FINDING OF BLOOD CHEMISTRY, UNSPECIFIED: ICD-10-CM

## 2023-03-20 DIAGNOSIS — D53.8 OTHER SPECIFIED NUTRITIONAL ANEMIAS: ICD-10-CM

## 2023-03-20 DIAGNOSIS — M05.79 RHEUMATOID ARTHRITIS INVOLVING MULTIPLE SITES WITH POSITIVE RHEUMATOID FACTOR (H): ICD-10-CM

## 2023-03-20 DIAGNOSIS — J44.9 COPD, VERY SEVERE (H): Primary | ICD-10-CM

## 2023-03-20 DIAGNOSIS — I10 ESSENTIAL (PRIMARY) HYPERTENSION: ICD-10-CM

## 2023-03-20 DIAGNOSIS — M54.41 CHRONIC BILATERAL LOW BACK PAIN WITH RIGHT-SIDED SCIATICA: ICD-10-CM

## 2023-03-20 DIAGNOSIS — G47.33 OSA (OBSTRUCTIVE SLEEP APNEA): ICD-10-CM

## 2023-03-20 PROCEDURE — 99214 OFFICE O/P EST MOD 30 MIN: CPT | Performed by: INTERNAL MEDICINE

## 2023-03-20 RX ORDER — PREDNISONE 1 MG/1
2 TABLET ORAL DAILY
Qty: 180 TABLET | Refills: 3 | Status: ON HOLD | OUTPATIENT
Start: 2023-03-20 | End: 2023-04-23

## 2023-03-20 RX ORDER — METOPROLOL SUCCINATE 50 MG/1
50 TABLET, EXTENDED RELEASE ORAL DAILY
Qty: 93 TABLET | Refills: 3 | COMMUNITY
Start: 2023-03-20 | End: 2023-05-09

## 2023-03-20 ASSESSMENT — PAIN SCALES - GENERAL: PAINLEVEL: SEVERE PAIN (6)

## 2023-03-20 ASSESSMENT — ENCOUNTER SYMPTOMS: NERVOUS/ANXIOUS: 1

## 2023-03-20 NOTE — LETTER
Opioid / Opioid Plus Controlled Substance Agreement    This is an agreement between you and your provider about the safe and appropriate use of controlled substance/opioids prescribed by your care team. Controlled substances are medicines that can cause physical and mental dependence (abuse).    There are strict laws about having and using these medicines. We here at [facility] are committing to working with you in your efforts to get better. To support you in this work, we ll help you schedule regular office appointments for medicine refills. If we must cancel or change your appointment for any reason, we ll make sure you have enough medicine to last until your next appointment.     As a Provider, I will:    Listen carefully to your concerns and treat you with respect.     Recommend a treatment plan that I believe is in your best interest. This plan may involve therapies other than opioid pain medication.     Talk with you often about the possible benefits, and the risk of harm of any medicine that we prescribe for you.     Provide a plan on how to taper (discontinue or go off) using this medicine if the decision is made to stop its use.    As a Patient, I understand that opioid(s):     Are a controlled substance prescribed by my care team to help me function or work and manage my condition(s).     Are strong medicines and can cause serious side effects such as:    Drowsiness, which can seriously affect my driving ability    A lower breathing rate, enough to cause death    Harm to my thinking ability     Depression     Abuse of and addiction to this medicine    Need to be taken exactly as prescribed. Combining opioids with certain medicines or chemicals (such as illegal drugs, sedatives, sleeping pills, and benzodiazepines) can be dangerous or even fatal. If I stop opioids suddenly, I may have severe withdrawal symptoms.    Do not work for all types of pain nor for all patients. If they re not helpful, I may  be asked to stop them.        The risks, benefits and side effects of these medicine(s) were explained to me. I agree that:  1. I will take part in other treatments as advised by my care team. This may be psychiatry or counseling, physical therapy, behavioral therapy, group treatment or a referral to a specialist.     2. I will keep all my appointments. I understand that this is part of the monitoring of opioids. My care team may require an office visit for EVERY opioid/controlled substance refill. If I miss appointments or don t follow instructions, my care team may stop my medicine.    3. I will take my medicines as prescribed. I will not change the dose or schedule unless my care team tells me to. There will be no refills if I run out early.     4. I may be asked to come to the clinic and complete a urine drug test or complete a pill count at any time. If I don t give a urine sample or participate in a pill count, the care team may stop my medicine.    5. I will only receive prescriptions from this clinic for chronic pain. If I am treated by another provider for acute pain issues, I will tell them that I am taking opioid pain medication for chronic pain and that I have a treatment agreement with this provider. I will inform my [facility] care team within one business day if I am given a prescription for any pain medication by another healthcare provider. My [facility] care team can contact other providers and pharmacists about my use of any medicines.    6. It is up to me to make sure that I don t run out of my medicines on weekends or holidays. If my care team is willing to refill my opioid prescription without a visit, I must request refills only during office hours. Refills may take up to 3 business days to process. I will use one pharmacy to fill all my opioid and other controlled substance prescriptions. I will notify the clinic about any changes to my insurance or medication  availability.    7. I am responsible for my prescriptions. If the medicine/prescription is lost, stolen or destroyed, it will not be replaced. I also agree not to share controlled substance medicines with anyone.    8. I am aware I should not use any illegal or recreational drugs. I agree not to drink alcohol unless my care team says I can.       9. If I enroll in the [place] [facility] Cann[facility] program, I will tell my care team prior to my next refill.     10. I will tell my care team right away if I become pregnant, have a new medical problem treated outside of my regular clinic, or have a change in my medications.    11. I understand that this medicine can affect my thinking, judgment and reaction time. Alcohol and drugs affect the brain and body, which can affect the safety of my driving. Being under the influence of alcohol or drugs can affect my decision-making, behaviors, personal safety, and the safety of others. Driving while impaired (DWI) can occur if a person is driving, operating, or in physical control of a car, motorcycle, boat, snowmobile, ATV, motorbike, off-road vehicle, or any other motor vehicle ([place] Statute 169A.20). I understand the risk if I choose to drive or operate any vehicle or machinery.    I understand that if I do not follow any of the conditions above, my prescriptions or treatment may be stopped or changed.          Opioids  What You Need to Know    What are opioids?   Opioids are pain medicines that must be prescribed by a doctor. They are also known as narcotics.     Examples are:   1. morphine (MS Contin, Nuha)  2. oxycodone (Oxycontin)  3. oxycodone and acetaminophen (Percocet)  4. hydrocodone and acetaminophen (Vicodin, Norco)   5. fentanyl patch (Duragesic)   6. hydromorphone (Dilaudid)   7. methadone  8. codeine (Tylenol #3)     What do opioids do well?   Opioids are best for severe short-term pain such as after a surgery or injury. They may work well for cancer pain. They may  help some people with long-lasting (chronic) pain.     What do opioids NOT do well?   Opioids never get rid of pain entirely, and they don t work well for most patients with chronic pain. Opioids don t reduce swelling, one of the causes of pain.                                    Other ways to manage chronic pain and improve function include:       Treat the health problem that may be causing pain    Anti-inflammation medicines, which reduce swelling and tenderness, such as ibuprofen (Advil, Motrin) or naproxen (Aleve)    Acetaminophen (Tylenol)    Antidepressants and anti-seizure medicines, especially for nerve pain    Topical treatments such as patches or creams    Injections or nerve blocks    Chiropractic or osteopathic treatment    Acupuncture, massage, deep breathing, meditation, visual imagery, aromatherapy    Use heat or ice at the pain site    Physical therapy     Exercise    Stop smoking    Take part in therapy       Risks and side effects     Talk to your doctor before you start or decide to keep taking opioids. Possible side effects include:      Lowering your breathing rate enough to cause death    Overdose, including death, especially if taking higher than prescribed doses    Worse depression symptoms; less pleasure in things you usually enjoy    Feeling tired or sluggish    Slower thoughts or cloudy thinking    Being more sensitive to pain over time; pain is harder to control    Trouble sleeping or restless sleep    Changes in hormone levels (for example, less testosterone)    Changes in sex drive or ability to have sex    Constipation    Unsafe driving    Itching and sweating    Dizziness    Nausea, throwing up and dry mouth    What else should I know about opioids?    Opioids may lead to dependence, tolerance, or addiction.      Dependence means that if you stop or reduce the medicine too quickly, you will have withdrawal symptoms. These include loose poop (diarrhea), jitters, flu-like symptoms,  nervousness and tremors. Dependence is not the same as addiction.                       Tolerance means needing higher doses over time to get the same effect. This may increase the chance of serious side effects.      Addiction is when people improperly use a substance that harms their body, their mind or their relations with others. Use of opiates can cause a relapse of addiction if you have a history of drug or alcohol abuse.      People who have used opioids for a long time may have a lower quality of life, worse depression, higher levels of pain and more visits to doctors.    You can overdose on opioids. Take these steps to lower your risk of overdose:    1. Recognize the signs:  Signs of overdose include decrease or loss of consciousness (blackout), slowed breathing, trouble waking up and blue lips. If someone is worried about overdose, they should call 911.    2. Talk to your doctor about Narcan (naloxone).   If you are at risk for overdose, you may be given a prescription for Narcan. This medicine very quickly reverses the effects of opioids.   If you overdose, a friend or family member can give you Narcan while waiting for the ambulance. They need to know the signs of overdose and how to give Narcan.     3. Don't use alcohol or street drugs.   Taking them with opioids can cause death.    4. Do not take any of these medicines unless your doctor says it s OK. Taking these with opioids can cause death:    Benzodiazepines, such as lorazepam (Ativan), alprazolam (Xanax) or diazepam (Valium)    Muscle relaxers, such as cyclobenzaprine (Flexeril)    Sleeping pills like zolpidem (Ambien)     Other opioids      How to keep you and other people safe while taking opioids:    1. Never share your opioids with others.  Opioid medicines are regulated by the Drug Enforcement Agency ([employer]). Selling or sharing medications is a criminal act.    2. Be sure to store opioids in a secure place, locked up if possible. Young children  can easily swallow them and overdose.    3. When you are traveling with your medicines, keep them in the original bottles. If you use a pill box, be sure you also carry a copy of your medicine list from your clinic or pharmacy.    4. Safe disposal of opioids    Most pharmacies have places to get rid of medicine, called disposal kiosks. Medicine disposal options are also available in every [facility]. Search your county and  medication disposal  to find more options. You can find more details at:  [email or web address]     I agree that my provider, clinic care team, and pharmacy may work with any city, state or federal law enforcement agency that investigates the misuse, sale, or other diversion of my controlled medicine. I will allow my provider to discuss my care with, or share a copy of, this agreement with any other treating provider, pharmacy or emergency room where I receive care.    I have read this agreement and have asked questions about anything I did not understand.    _______________________________________________________  Patient Signature - Harrison Thomas _____________________                   Date     _______________________________________________________  Provider Signature - Yaneli Dozier MD   _____________________                   Date     _______________________________________________________  Witness Signature (required if provider not present while patient signing)   _____________________                   Date

## 2023-03-20 NOTE — PATIENT INSTRUCTIONS
CHANGE BP MEDICATIONS TO BED TIME OR EVENING  USE 2 MG PREDNISONE PER DAY  RESPIRATORY REHAB    USE OXYGEN

## 2023-03-20 NOTE — PROGRESS NOTES
Assessment & Plan     COPD, very severe (H)  Since hospitalization patient is now on Zithromax 3 times a week and that has helped him to be more stable  He has gone off prednisone but I think he should take about 2 mg daily which will also help him with his rheumatoid arthritis and secondary Edward's  In addition patient is feeling very tired and it will help with that  He will continue inhalers  He will continue to use oxygen and he should be in pulmonary rehab  - predniSONE (DELTASONE) 1 MG tablet  Dispense: 180 tablet; Refill: 3    Chronic bilateral low back pain with right-sided sciatica  Patient is on chronic opioids and he will do a urine drug screen and also continue therapy    Rheumatoid arthritis involving multiple sites with positive rheumatoid factor (H)  Patient is off any methotrexate or immune suppressant at present and is seeing rheumatology  - CRP, inflammation    Essential (primary) hypertension  Patient is off lisinopril and he will change all his blood pressure meds Toprol and amlodipine to bedtime  - metoprolol succinate ER (TOPROL XL) 50 MG 24 hr tablet  Dispense: 93 tablet; Refill: 3  - PRIMARY CARE FOLLOW-UP SCHEDULING  - **Basic metabolic panel FUTURE 14d    Chronic fatigue  Patient Instructions   CHANGE BP MEDICATIONS TO BED TIME OR EVENING  USE 2 MG PREDNISONE PER DAY  RESPIRATORY REHAB    USE OXYGEN      - TSH with free T4 reflex  - CBC with platelets  - Vitamin B12  - Iron and iron binding capacity  - PRIMARY CARE FOLLOW-UP SCHEDULING    Other specified nutritional anemias      Abnormal finding of blood chemistry, unspecified    - Iron and iron binding capacity    BRENNEN (obstructive sleep apnea)  Not using cpap  Will use oxygen        Return for COPD, depression, fatigue, thyroid.   Follow-up Visit   Expected date:  May 20, 2023 (Approximate)      Follow Up Appointment Details:     Follow-up with whom?: Other Primary Care Services    Follow-Up for what?: Lab Visit    Is this an as-needed  follow-up?: No                    Yaneli Dozier MD  [facility] Terry is a 77 year old, presenting for the following health issues:  Anxiety    This is a very complex patient who has severe COPD and severe fatigue  He is also feeling more tired  On direct questioning he is not using oxygen all day only at bedtime and his oxygen dips below 85 and he exercises  He is homebound and has home care  Anxiety    History of Present Illness       Back Pain:  He presents for follow up of back pain. Patient's back pain is a chronic problem.  Location of back pain:  Right lower back and right buttock  Description of back pain: dull ache and shooting  Back pain spreads: right buttocks and right foot    Since patient first noticed back pain, pain is: unchanged  Does back pain interfere with his job:  Not applicable      COPD:  He presents for follow up of COPD.  Overall, COPD symptoms are much worse since last visit. He has more than usual fatigue or shortness of breath with exertion and more than usual shortness of breath at rest.  He rarely coughs and does have change in sputum. No recent fever. He can walk less than 1 block without stopping to rest. He can not walk a flight of stairs without resting. The patient has had an ED, urgent care, or hospital admission because of COPD since the last visit. He states he has had 2 visit(s) to an ED, Urgent Care, or Hospital due to his COPD.    He eats 2-3 servings of fruits and vegetables daily.He consumes 0 sweetened beverage(s) daily.He exercises with enough effort to increase his heart rate 10 to 19 minutes per day.  He exercises with enough effort to increase his heart rate 5 days per week.   He is taking medications regularly.             Review of Systems   Psychiatric/Behavioral: The patient is nervous/anxious.       10 point ROS of systems including Constitutional, Eyes, Respiratory, Cardiovascular, Gastroenterology, Genitourinary,  "Integumentary, Muscularskeletal, Psychiatric were all negative except for pertinent positives noted in my HPI.        Objective    /61 (BP Location: Right arm, Patient Position: Chair, Cuff Size: Adult Regular)   Pulse 63   Temp 98  F (36.7  C) (Temporal)   Resp 18   Ht 1.778 m (5' 10\")   Wt 73.5 kg (162 lb)   SpO2 93%   BMI 23.24 kg/m    Body mass index is 23.24 kg/m .  Physical Exam   GENERAL: healthy, alert and no distress  NECK: no adenopathy, no asymmetry, masses, or scars and thyroid normal to palpation  RESP: lungs clear to auscultation - no rales, rhonchi or wheezes  CV: regular rate and rhythm, normal S1 S2, no S3 or S4, no murmur, click or rub, no peripheral edema and peripheral pulses strong  MS: no gross musculoskeletal defects noted, no edema    Patient Active Problem List   Diagnosis     Essential hypertension, benign     Pain in joint, upper arm     Allergic rhinitis     Pain in joint, lower leg     Chronic airway obstruction (H)     Monoclonal paraproteinemia     Immunodeficiency (H)     Eczema     COPD (chronic obstructive pulmonary disease) (H)     Transient cerebral ischemia     Bunion     Occipital neuralgia     Hyperlipidemia LDL goal <100     Health Care Home     Low back pain     Advanced directives, counseling/discussion     Olecranon bursitis of right elbow     Bruit     Retina hole     signed & scanned on 09/23/2011  (1-4-2013 printed but not scanned in)      Chronic, continuous use of opioids     Atherosclerosis of native coronary artery of native heart without angina pectoris     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, unspecified thyrotoxicosis type     Right-sided low back pain with right-sided sciatica     Coronary artery disease involving native coronary artery of native heart without angina pectoris     BRENNEN (obstructive sleep apnea)     Rheumatoid arthritis (H)     Hammer toe of right foot     Hallux limitus of right foot     Corn of toe     Non-recurrent unilateral " inguinal hernia with obstruction without gangrene     Left inguinal hernia     Metastasis from thyroid cancer (H)     Nodule of upper lobe of right lung     Preoperative examination     Chronic pain disorder     Closed fracture of one rib     Acute on chronic respiratory failure with hypoxia (H)     Closed fracture of one rib of left side, initial encounter     Dehydration     Shock (H)     CALLUM (acute kidney injury) (H)     COPD, very severe (H)     Acute kidney failure with tubular necrosis (H)     Hypokalemia     Current Outpatient Medications   Medication     acetaminophen (TYLENOL) 325 MG tablet     albuterol (ACCUNEB) 1.25 MG/3ML neb solution     albuterol (PROAIR HFA/PROVENTIL HFA/VENTOLIN HFA) 108 (90 Base) MCG/ACT inhaler     amLODIPine (NORVASC) 5 MG tablet     ascorbic acid 1000 MG TABS tablet     aspirin 81 MG tablet     azithromycin (ZITHROMAX) 500 MG tablet     calcium carbonate (OS-KARLIE) 1500 (600 Ca) MG tablet     cholecalciferol 25 MCG (1000 UT) TABS     DULoxetine (CYMBALTA) 20 MG capsule     Fluticasone-Umeclidin-Vilanterol (TRELEGY ELLIPTA) 100-62.5-25 MCG/INH oral inhaler     furosemide (LASIX) 20 MG tablet     guaiFENesin (MUCINEX) 600 MG 12 hr tablet     hydrOXYzine (ATARAX) 10 MG tablet     levothyroxine (SYNTHROID/LEVOTHROID) 137 MCG tablet     MegaRed Omega-3 Krill Oil 500 MG CAPS     Melatonin 10 MG TABS tablet     metoprolol succinate ER (TOPROL XL) 50 MG 24 hr tablet     multivitamin w/minerals (THERA-VIT-M) tablet     naloxone (NARCAN) 4 MG/0.1ML nasal spray     nitroGLYcerin (NITROSTAT) 0.4 MG sublingual tablet     oxyCODONE (ROXICODONE) 5 MG tablet     potassium chloride ER (KLOR-CON M) 20 MEQ CR tablet     predniSONE (DELTASONE) 1 MG tablet     rosuvastatin (CRESTOR) 10 MG tablet     senna-docusate (SENOKOT-S/PERICOLACE) 8.6-50 MG tablet     No current facility-administered medications for this visit.       Patient Active Problem List   Diagnosis     Essential hypertension, benign      Pain in joint, upper arm     Allergic rhinitis     Pain in joint, lower leg     Chronic airway obstruction (H)     Monoclonal paraproteinemia     Immunodeficiency (H)     Eczema     COPD (chronic obstructive pulmonary disease) (H)     Transient cerebral ischemia     Bunion     Occipital neuralgia     Hyperlipidemia LDL goal <100     Health Care Home     Low back pain     Advanced directives, counseling/discussion     Olecranon bursitis of right elbow     Bruit     Retina hole     signed & scanned on 09/23/2011  (1-4-2013 printed but not scanned in)      Chronic, continuous use of opioids     Atherosclerosis of native coronary artery of native heart without angina pectoris     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, unspecified thyrotoxicosis type     Right-sided low back pain with right-sided sciatica     Coronary artery disease involving native coronary artery of native heart without angina pectoris     BRENNEN (obstructive sleep apnea)     Rheumatoid arthritis (H)     Hammer toe of right foot     Hallux limitus of right foot     Corn of toe     Non-recurrent unilateral inguinal hernia with obstruction without gangrene     Left inguinal hernia     Metastasis from thyroid cancer (H)     Nodule of upper lobe of right lung     Preoperative examination     Chronic pain disorder     Closed fracture of one rib     Acute on chronic respiratory failure with hypoxia (H)     Closed fracture of one rib of left side, initial encounter     Dehydration     Shock (H)     CALLUM (acute kidney injury) (H)     COPD, very severe (H)     Acute kidney failure with tubular necrosis (H)     Hypokalemia     Current Outpatient Medications   Medication     acetaminophen (TYLENOL) 325 MG tablet     albuterol (ACCUNEB) 1.25 MG/3ML neb solution     albuterol (PROAIR HFA/PROVENTIL HFA/VENTOLIN HFA) 108 (90 Base) MCG/ACT inhaler     amLODIPine (NORVASC) 5 MG tablet     ascorbic acid 1000 MG TABS tablet     aspirin 81 MG tablet     azithromycin  (ZITHROMAX) 500 MG tablet     calcium carbonate (OS-KARLIE) 1500 (600 Ca) MG tablet     cholecalciferol 25 MCG (1000 UT) TABS     DULoxetine (CYMBALTA) 20 MG capsule     Fluticasone-Umeclidin-Vilanterol (TRELEGY ELLIPTA) 100-62.5-25 MCG/INH oral inhaler     furosemide (LASIX) 20 MG tablet     guaiFENesin (MUCINEX) 600 MG 12 hr tablet     hydrOXYzine (ATARAX) 10 MG tablet     levothyroxine (SYNTHROID/LEVOTHROID) 137 MCG tablet     MegaRed Omega-3 Krill Oil 500 MG CAPS     Melatonin 10 MG TABS tablet     metoprolol succinate ER (TOPROL XL) 50 MG 24 hr tablet     multivitamin w/minerals (THERA-VIT-M) tablet     naloxone (NARCAN) 4 MG/0.1ML nasal spray     nitroGLYcerin (NITROSTAT) 0.4 MG sublingual tablet     oxyCODONE (ROXICODONE) 5 MG tablet     potassium chloride ER (KLOR-CON M) 20 MEQ CR tablet     predniSONE (DELTASONE) 1 MG tablet     rosuvastatin (CRESTOR) 10 MG tablet     senna-docusate (SENOKOT-S/PERICOLACE) 8.6-50 MG tablet     No current facility-administered medications for this visit.

## 2023-03-23 ENCOUNTER — DOCUMENTATION ONLY (OUTPATIENT)
Dept: OTHER | Facility: CLINIC | Age: 78
End: 2023-03-23
Payer: MEDICARE

## 2023-03-24 ENCOUNTER — OFFICE VISIT (OUTPATIENT)
Dept: PULMONOLOGY | Facility: CLINIC | Age: 78
End: 2023-03-24
Attending: INTERNAL MEDICINE
Payer: MEDICARE

## 2023-03-24 VITALS — HEART RATE: 59 BPM | SYSTOLIC BLOOD PRESSURE: 147 MMHG | DIASTOLIC BLOOD PRESSURE: 73 MMHG | OXYGEN SATURATION: 91 %

## 2023-03-24 DIAGNOSIS — J42 CHRONIC BRONCHITIS, UNSPECIFIED CHRONIC BRONCHITIS TYPE (H): Primary | ICD-10-CM

## 2023-03-24 DIAGNOSIS — F51.01 PRIMARY INSOMNIA: ICD-10-CM

## 2023-03-24 DIAGNOSIS — J18.9 PNEUMONIA DUE TO INFECTIOUS ORGANISM, UNSPECIFIED LATERALITY, UNSPECIFIED PART OF LUNG: ICD-10-CM

## 2023-03-24 DIAGNOSIS — R06.00 DYSPNEA, UNSPECIFIED TYPE: ICD-10-CM

## 2023-03-24 DIAGNOSIS — J44.1 COPD, FREQUENT EXACERBATIONS (H): ICD-10-CM

## 2023-03-24 DIAGNOSIS — R93.89 ABNORMAL CHEST CT: ICD-10-CM

## 2023-03-24 DIAGNOSIS — G47.33 OBSTRUCTIVE SLEEP APNEA: ICD-10-CM

## 2023-03-24 DIAGNOSIS — J43.8 OTHER EMPHYSEMA (H): ICD-10-CM

## 2023-03-24 DIAGNOSIS — J44.1 CHRONIC OBSTRUCTIVE PULMONARY DISEASE WITH ACUTE EXACERBATION (H): Primary | ICD-10-CM

## 2023-03-24 LAB
6 MIN WALK (FT): 800 FT
6 MIN WALK (M): 244 M

## 2023-03-24 PROCEDURE — 99207 PR NO CHARGE LOS: CPT

## 2023-03-24 PROCEDURE — 99214 OFFICE O/P EST MOD 30 MIN: CPT | Mod: 25 | Performed by: INTERNAL MEDICINE

## 2023-03-24 PROCEDURE — 94726 PLETHYSMOGRAPHY LUNG VOLUMES: CPT | Performed by: INTERNAL MEDICINE

## 2023-03-24 PROCEDURE — 94618 PULMONARY STRESS TESTING: CPT | Performed by: INTERNAL MEDICINE

## 2023-03-24 PROCEDURE — 94729 DIFFUSING CAPACITY: CPT | Performed by: INTERNAL MEDICINE

## 2023-03-24 PROCEDURE — 94375 RESPIRATORY FLOW VOLUME LOOP: CPT | Performed by: INTERNAL MEDICINE

## 2023-03-24 PROCEDURE — G0463 HOSPITAL OUTPT CLINIC VISIT: HCPCS | Performed by: INTERNAL MEDICINE
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
## 2023-03-24 NOTE — PATIENT INSTRUCTIONS
Moderate COPD with some increased oxygen need with exertion.  Multiple recent COPD flares after hospitalization in January @ [facility]    Plan:  Continue current COPD medications, including azithromycin 3x/week and very low dose prednisone  Prescribed augmentin to be used with Prednisone for future flare-ups (prescribed for twice daily for 14 days but can stop @ 7 days if improved.  Order for Pulmonary Rehab placed  Referral to Sleep Medicine - Dr. Mccabe @ [facility]  Return to clinic 6 months  For breathing issues, you can call our lung nurses at [phone] ([place] / [facility] / Cleo)

## 2023-03-24 NOTE — NURSING NOTE
Chief Complaint   Patient presents with     Follow Up     Return appt      Vitals were taken and medications were reconciled.     Arina Sesay RMA  9:06 AM
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
## 2023-03-27 LAB
DLCOUNC-%PRED-PRE: 41 %
DLCOUNC-PRE: 10.1 ML/MIN/MMHG
DLCOUNC-PRED: 24.37 ML/MIN/MMHG
ERV-%PRED-PRE: 47 %
ERV-PRE: 0.53 L
ERV-PRED: 1.11 L
EXPTIME-PRE: 8.09 SEC
FEF2575-%PRED-PRE: 24 %
FEF2575-PRE: 0.5 L/SEC
FEF2575-PRED: 2.06 L/SEC
FEFMAX-%PRED-PRE: 52 %
FEFMAX-PRE: 4.02 L/SEC
FEFMAX-PRED: 7.61 L/SEC
FEV1-%PRED-PRE: 46 %
FEV1-PRE: 1.28 L
FEV1FEV6-PRE: 57 %
FEV1FEV6-PRED: 77 %
FEV1FVC-PRE: 54 %
FEV1FVC-PRED: 76 %
FEV1SVC-PRE: 53 %
FEV1SVC-PRED: 61 %
FIFMAX-PRE: 2.87 L/SEC
FRCPLETH-%PRED-PRE: 88 %
FRCPLETH-PRE: 3.26 L
FRCPLETH-PRED: 3.69 L
FVC-%PRED-PRE: 65 %
FVC-PRE: 2.37 L
FVC-PRED: 3.62 L
IC-%PRED-PRE: 55 %
IC-PRE: 1.88 L
IC-PRED: 3.36 L
RVPLETH-%PRED-PRE: 100 %
RVPLETH-PRE: 2.73 L
RVPLETH-PRED: 2.72 L
TLCPLETH-%PRED-PRE: 74 %
TLCPLETH-PRE: 5.14 L
TLCPLETH-PRED: 6.92 L
VA-%PRED-PRE: 55 %
VA-PRE: 3.4 L
VC-%PRED-PRE: 53 %
VC-PRE: 2.41 L
VC-PRED: 4.47 L

## 2023-03-27 NOTE — TELEPHONE ENCOUNTER
Called and spoke with Lexis MCCLOUD at [facility]. Relayed Dr Dozier's message to Lexis.

## 2023-03-27 NOTE — TELEPHONE ENCOUNTER
Lexis MCCLOUD from [facility] called requesting-     The ok to discharge pt from  as pt is no longer homebound and will be starting outpatient pulmonary rehab.     Routing to PCP for approval.       Please call Lexis back with PCPs response.     Lexis #- [phone] (can leave a detailed message)

## 2023-03-29 ENCOUNTER — MYC MEDICAL ADVICE (OUTPATIENT)
Dept: CARDIOLOGY | Facility: CLINIC | Age: 78
End: 2023-03-29
Payer: MEDICARE

## 2023-03-29 NOTE — TELEPHONE ENCOUNTER
Patient returned call and left voicemail message with update blood pressure reading.      Last Blood Pressure: 147/73  Last Heart Rate: 59  Date: 3/24/23  Location: Other Specialty    Today's Blood Pressure: 137/70  Today's Heart Rate: n/a  Location: Home BP    Patient reported blood pressure updated in Epic. Blood pressure falls within [place] Community Measures guidelines.  Patient will follow up as previously advised.

## 2023-04-05 DIAGNOSIS — C73 METASTASIS FROM THYROID CANCER (H): ICD-10-CM

## 2023-04-05 DIAGNOSIS — C79.9 METASTASIS FROM THYROID CANCER (H): ICD-10-CM

## 2023-04-05 RX ORDER — OXYCODONE HYDROCHLORIDE 5 MG/1
5 TABLET ORAL 3 TIMES DAILY PRN
Qty: 90 TABLET | Refills: 0 | Status: SHIPPED | OUTPATIENT
Start: 2023-04-05 | End: 2023-04-06

## 2023-04-05 NOTE — TELEPHONE ENCOUNTER
Pharmacy transmission failed - re-pended Rx    Zaria HAYS, Triage RN  [facility]

## 2023-04-05 NOTE — TELEPHONE ENCOUNTER
Pt called the clinic requesting a refill of oxycodone 5mg tablets.     Medication pended for provider to review.

## 2023-04-06 RX ORDER — OXYCODONE HYDROCHLORIDE 5 MG/1
5 TABLET ORAL 3 TIMES DAILY PRN
Qty: 90 TABLET | Refills: 0 | Status: ON HOLD | OUTPATIENT
Start: 2023-04-06 | End: 2023-04-23

## 2023-04-20 ENCOUNTER — PATIENT OUTREACH (OUTPATIENT)
Dept: CARE COORDINATION | Facility: CLINIC | Age: 78
End: 2023-04-20
Payer: MEDICARE

## 2023-04-20 ENCOUNTER — NURSE TRIAGE (OUTPATIENT)
Dept: FAMILY MEDICINE | Facility: CLINIC | Age: 78
End: 2023-04-20
Payer: MEDICARE

## 2023-04-20 DIAGNOSIS — S29.9XXA TRAUMATIC INJURY OF RIB: Primary | ICD-10-CM

## 2023-04-20 NOTE — TELEPHONE ENCOUNTER
"Called patient to triage chest injury     MECHANISM: feel against a chair and hit his left side of his chest against the chair   ONSET 4/8/23   BRUISING? No puncture injury, bruise below rib, 2 inches above hip on left side, extends about 2 inches down. Bruise does not seem to be improving   PAIN: yes, very painful. Painful to deep breath or move sideways, can't roll onto his side, painful with moving or pressing on the area   PAIN LEVEL: moving or laying down is 10/10. Has OxyContin for back, \"doesn't do anything\" - At rest without moving pain is 0/10   APPEARANCE: some swelling per wife, bruise looks larger than 2x2 inches   BREATHING:  difficulty deep breathing due to pain, some SOB with walking but also has COPD     OTHER: After the fall O2 dropped and was having trouble breathing, took Zpak and Prednisone he had on hand for COPD and that helped thinks he had COPD flare up at the time of the fall- had the script on hand for COPD    Moving around O2 drops to 87-89% sitting is fine 92-93%. Wears O2 at night 1 L/min     Per protocol UC/OV/ER now     Huddled with ADS provider - recommends pt be seen in UC and do rule out chest x-ray rather than ADS visit     Patient refuses to go to ER or UC, asking for in clinic OV instead. NOTHING available with any provider today or tomorrow in clinic.    Asking if PCP can work him in tomorrow     Please advise     Zaria HAYS, Triage RN  [facility]           Hi Doctor Tasia, on April 8 I tripped over some shoes at the door and fell on a kitchen chair again and I believe I broke or fractured my rib again on the left side and I also have bruising and a lot of pain above my left hip and bottom of my belly and it hurts very bad when I move.  I started the 20mg Prednisone series (3 x 3, 3 x 2, 3 x 1 and 3 x 1/2) which I will be finishing up on Friday.  (I also did the stronger Amoxicillin for a week)       1.  Could you refill the 20mg prednisone " prescription again so I have some on hand if I start feeling poorly again?  2.  I'm scheduled to have a CT Chest W/O Contract on 4/24/23 and I'm wondering if we could also include the area above my left hip.  I feel like something is not right in that area and wonder if I possibly have an internal injury.     Thank you, Harrison         Reason for Disposition    Can't take a deep breath but no respiratory distress (e.g., hurts to take a deep breath)    Additional Information    Negative: Major injury from dangerous force or speed (e.g., MVA, fall > 10 feet or 3 meters)    Negative: Bullet wound, knife wound or other penetrating object    Negative: Puncture wound that sounds life-threatening to the triager    Negative: SEVERE difficulty breathing (e.g., struggling for each breath, speaks in single words)    Negative: Major bleeding (actively dripping or spurting) that can't be stopped    Negative: Open wound of the chest with sound of moving air (sucking wound) or visible air bubbles    Negative: Shock suspected (e.g., cold/pale/clammy skin, too weak to stand, low BP, rapid pulse)    Negative: Coughing or spitting up blood    Negative: Bluish (or gray) lips or face    Negative: Unconscious or was unconscious    Negative: Sounds like a life-threatening emergency to the triager    Negative: Chest pain not from an injury    Negative: Wound looks infected    Negative: SEVERE chest pain    Negative: Difficulty breathing and not severe    Negative: Skin is split open or gaping    Negative: Bleeding won't stop after 10 minutes of direct pressure (using correct technique)    Negative: Sounds like a serious injury to the triager    Protocols used: CHEST INJURY-A-OH

## 2023-04-21 ENCOUNTER — OFFICE VISIT (OUTPATIENT)
Dept: URGENT CARE | Facility: URGENT CARE | Age: 78
End: 2023-04-21
Payer: MEDICARE

## 2023-04-21 ENCOUNTER — APPOINTMENT (OUTPATIENT)
Dept: CT IMAGING | Facility: CLINIC | Age: 78
DRG: 206 | End: 2023-04-21
Attending: EMERGENCY MEDICINE
Payer: MEDICARE

## 2023-04-21 ENCOUNTER — HOSPITAL ENCOUNTER (INPATIENT)
Facility: CLINIC | Age: 78
LOS: 3 days | Discharge: HOME | DRG: 206 | End: 2023-04-24
Attending: EMERGENCY MEDICINE | Admitting: INTERNAL MEDICINE
Payer: MEDICARE

## 2023-04-21 DIAGNOSIS — S22.32XA CLOSED FRACTURE OF ONE RIB OF LEFT SIDE, INITIAL ENCOUNTER: ICD-10-CM

## 2023-04-21 DIAGNOSIS — L28.2 PRURITIC RASH: ICD-10-CM

## 2023-04-21 DIAGNOSIS — S22.39XA CLOSED FRACTURE OF ONE RIB, UNSPECIFIED LATERALITY, INITIAL ENCOUNTER: Primary | ICD-10-CM

## 2023-04-21 DIAGNOSIS — F43.21 SITUATIONAL DEPRESSION: ICD-10-CM

## 2023-04-21 DIAGNOSIS — F41.1 GAD (GENERALIZED ANXIETY DISORDER): ICD-10-CM

## 2023-04-21 DIAGNOSIS — E87.6 HYPOKALEMIA: ICD-10-CM

## 2023-04-21 DIAGNOSIS — C73 METASTASIS FROM THYROID CANCER (H): ICD-10-CM
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
## 2023-04-21 DIAGNOSIS — J44.9 COPD, VERY SEVERE (H): ICD-10-CM

## 2023-04-21 DIAGNOSIS — R09.02 HYPOXIA: ICD-10-CM

## 2023-04-21 DIAGNOSIS — C79.9 METASTASIS FROM THYROID CANCER (H): ICD-10-CM

## 2023-04-21 DIAGNOSIS — S36.029A SPLEEN HEMATOMA, INITIAL ENCOUNTER: ICD-10-CM

## 2023-04-21 LAB
ABO/RH(D): NORMAL
ANION GAP SERPL CALCULATED.3IONS-SCNC: 9 MMOL/L (ref 7–15)
ANTIBODY SCREEN: NEGATIVE
BASOPHILS # BLD AUTO: 0.1 10E3/UL (ref 0–0.2)
BASOPHILS NFR BLD AUTO: 1 %
BUN SERPL-MCNC: 17.7 MG/DL (ref 8–23)
CALCIUM SERPL-MCNC: 9.7 MG/DL (ref 8.8–10.2)
CHLORIDE SERPL-SCNC: 99 MMOL/L (ref 98–107)
CREAT SERPL-MCNC: 1.04 MG/DL (ref 0.67–1.17)
DEPRECATED HCO3 PLAS-SCNC: 32 MMOL/L (ref 22–29)
EOSINOPHIL # BLD AUTO: 1.7 10E3/UL (ref 0–0.7)
EOSINOPHIL NFR BLD AUTO: 17 %
ERYTHROCYTE [DISTWIDTH] IN BLOOD BY AUTOMATED COUNT: 14.6 % (ref 10–15)
GFR SERPL CREATININE-BSD FRML MDRD: 75 ML/MIN/1.73M2
GLUCOSE SERPL-MCNC: 89 MG/DL (ref 70–99)
HCT VFR BLD AUTO: 47.3 % (ref 40–53)
HGB BLD-MCNC: 15.1 G/DL (ref 13.3–17.7)
HOLD SPECIMEN: NORMAL
HOLD SPECIMEN: NORMAL
IMM GRANULOCYTES # BLD: 0.1 10E3/UL
IMM GRANULOCYTES NFR BLD: 1 %
INR PPP: 0.94 (ref 0.85–1.15)
LYMPHOCYTES # BLD AUTO: 1.1 10E3/UL (ref 0.8–5.3)
LYMPHOCYTES NFR BLD AUTO: 11 %
MCH RBC QN AUTO: 28.8 PG (ref 26.5–33)
MCHC RBC AUTO-ENTMCNC: 31.9 G/DL (ref 31.5–36.5)
MCV RBC AUTO: 90 FL (ref 78–100)
MONOCYTES # BLD AUTO: 0.8 10E3/UL (ref 0–1.3)
MONOCYTES NFR BLD AUTO: 8 %
NEUTROPHILS # BLD AUTO: 6.3 10E3/UL (ref 1.6–8.3)
NEUTROPHILS NFR BLD AUTO: 62 %
NRBC # BLD AUTO: 0 10E3/UL
NRBC BLD AUTO-RTO: 0 /100
PLATELET # BLD AUTO: 167 10E3/UL (ref 150–450)
POTASSIUM SERPL-SCNC: 4.4 MMOL/L (ref 3.4–5.3)
RBC # BLD AUTO: 5.25 10E6/UL (ref 4.4–5.9)
SARS-COV-2 RNA RESP QL NAA+PROBE: NEGATIVE
SODIUM SERPL-SCNC: 140 MMOL/L (ref 136–145)
SPECIMEN EXPIRATION DATE: NORMAL
WBC # BLD AUTO: 10.1 10E3/UL (ref 4–11)

## 2023-04-21 PROCEDURE — 250N000011 HC RX IP 250 OP 636: Performed by: EMERGENCY MEDICINE

## 2023-04-21 PROCEDURE — 96361 HYDRATE IV INFUSION ADD-ON: CPT

## 2023-04-21 PROCEDURE — 99222 1ST HOSP IP/OBS MODERATE 55: CPT | Mod: AI | Performed by: INTERNAL MEDICINE

## 2023-04-21 PROCEDURE — 250N000009 HC RX 250: Performed by: EMERGENCY MEDICINE

## 2023-04-21 PROCEDURE — 85610 PROTHROMBIN TIME: CPT | Performed by: EMERGENCY MEDICINE

## 2023-04-21 PROCEDURE — 99285 EMERGENCY DEPT VISIT HI MDM: CPT | Mod: 25

## 2023-04-21 PROCEDURE — 250N000013 HC RX MED GY IP 250 OP 250 PS 637: Performed by: INTERNAL MEDICINE

## 2023-04-21 PROCEDURE — 96374 THER/PROPH/DIAG INJ IV PUSH: CPT

## 2023-04-21 PROCEDURE — 74177 CT ABD & PELVIS W/CONTRAST: CPT | Mod: MA

## 2023-04-21 PROCEDURE — U0003 INFECTIOUS AGENT DETECTION BY NUCLEIC ACID (DNA OR RNA); SEVERE ACUTE RESPIRATORY SYNDROME CORONAVIRUS 2 (SARS-COV-2) (CORONAVIRUS DISEASE [COVID-19]), AMPLIFIED PROBE TECHNIQUE, MAKING USE OF HIGH THROUGHPUT TECHNOLOGIES AS DESCRIBED BY CMS-2020-01-R: HCPCS | Performed by: EMERGENCY MEDICINE

## 2023-04-21 PROCEDURE — 84520 ASSAY OF UREA NITROGEN: CPT | Performed by: EMERGENCY MEDICINE

## 2023-04-21 PROCEDURE — 85025 COMPLETE CBC W/AUTO DIFF WBC: CPT | Performed by: EMERGENCY MEDICINE

## 2023-04-21 PROCEDURE — 96376 TX/PRO/DX INJ SAME DRUG ADON: CPT

## 2023-04-21 PROCEDURE — 86850 RBC ANTIBODY SCREEN: CPT | Performed by: EMERGENCY MEDICINE

## 2023-04-21 PROCEDURE — 258N000003 HC RX IP 258 OP 636: Performed by: EMERGENCY MEDICINE

## 2023-04-21 PROCEDURE — 120N000001 HC R&B MED SURG/OB

## 2023-04-21 PROCEDURE — 36415 COLL VENOUS BLD VENIPUNCTURE: CPT | Performed by: EMERGENCY MEDICINE

## 2023-04-21 PROCEDURE — 86901 BLOOD TYPING SEROLOGIC RH(D): CPT | Performed by: EMERGENCY MEDICINE

## 2023-04-21 RX ORDER — METOPROLOL SUCCINATE 50 MG/1
50 TABLET, EXTENDED RELEASE ORAL DAILY
Status: DISCONTINUED | OUTPATIENT
Start: 2023-04-22 | End: 2023-04-24 | Stop reason: HOSPADM

## 2023-04-21 RX ORDER — NALOXONE HYDROCHLORIDE 0.4 MG/ML
0.2 INJECTION, SOLUTION INTRAMUSCULAR; INTRAVENOUS; SUBCUTANEOUS
Status: DISCONTINUED | OUTPATIENT
Start: 2023-04-21 | End: 2023-04-24 | Stop reason: HOSPADM

## 2023-04-21 RX ORDER — POLYETHYLENE GLYCOL 3350 17 G/17G
17 POWDER, FOR SOLUTION ORAL DAILY PRN
Status: DISCONTINUED | OUTPATIENT
Start: 2023-04-21 | End: 2023-04-24 | Stop reason: HOSPADM

## 2023-04-21 RX ORDER — OXYCODONE HYDROCHLORIDE 5 MG/1
5 TABLET ORAL
Status: DISCONTINUED | OUTPATIENT
Start: 2023-04-21 | End: 2023-04-21

## 2023-04-21 RX ORDER — NITROGLYCERIN 0.4 MG/1
0.4 TABLET SUBLINGUAL EVERY 5 MIN PRN
Status: DISCONTINUED | OUTPATIENT
Start: 2023-04-21 | End: 2023-04-24 | Stop reason: HOSPADM

## 2023-04-21 RX ORDER — FUROSEMIDE 20 MG
20 TABLET ORAL
Status: DISCONTINUED | OUTPATIENT
Start: 2023-04-21 | End: 2023-04-24 | Stop reason: HOSPADM

## 2023-04-21 RX ORDER — AMOXICILLIN 250 MG
1 CAPSULE ORAL 2 TIMES DAILY
Status: DISCONTINUED | OUTPATIENT
Start: 2023-04-21 | End: 2023-04-24 | Stop reason: HOSPADM

## 2023-04-21 RX ORDER — IOPAMIDOL 755 MG/ML
82 INJECTION, SOLUTION INTRAVASCULAR ONCE
Status: COMPLETED | OUTPATIENT
Start: 2023-04-21 | End: 2023-04-21

## 2023-04-21 RX ORDER — ONDANSETRON 2 MG/ML
4 INJECTION INTRAMUSCULAR; INTRAVENOUS EVERY 6 HOURS PRN
Status: DISCONTINUED | OUTPATIENT
Start: 2023-04-21 | End: 2023-04-24 | Stop reason: HOSPADM

## 2023-04-21 RX ORDER — ALBUTEROL SULFATE 90 UG/1
2 AEROSOL, METERED RESPIRATORY (INHALATION) EVERY 6 HOURS PRN
Status: DISCONTINUED | OUTPATIENT
Start: 2023-04-21 | End: 2023-04-24 | Stop reason: HOSPADM

## 2023-04-21 RX ORDER — AMLODIPINE BESYLATE 5 MG/1
5 TABLET ORAL AT BEDTIME
Status: DISCONTINUED | OUTPATIENT
Start: 2023-04-21 | End: 2023-04-24 | Stop reason: HOSPADM

## 2023-04-21 RX ORDER — NALOXONE HYDROCHLORIDE 0.4 MG/ML
0.4 INJECTION, SOLUTION INTRAMUSCULAR; INTRAVENOUS; SUBCUTANEOUS
Status: DISCONTINUED | OUTPATIENT
Start: 2023-04-21 | End: 2023-04-24 | Stop reason: HOSPADM

## 2023-04-21 RX ORDER — DULOXETIN HYDROCHLORIDE 20 MG/1
20 CAPSULE, DELAYED RELEASE ORAL AT BEDTIME
Status: DISCONTINUED | OUTPATIENT
Start: 2023-04-21 | End: 2023-04-24 | Stop reason: HOSPADM

## 2023-04-21 RX ORDER — VITAMIN B COMPLEX
25 TABLET ORAL DAILY
Status: DISCONTINUED | OUTPATIENT
Start: 2023-04-21 | End: 2023-04-24 | Stop reason: HOSPADM

## 2023-04-21 RX ORDER — LEVOTHYROXINE SODIUM 137 UG/1
137 TABLET ORAL DAILY
Status: DISCONTINUED | OUTPATIENT
Start: 2023-04-22 | End: 2023-04-24 | Stop reason: HOSPADM

## 2023-04-21 RX ORDER — POTASSIUM CHLORIDE 1500 MG/1
20 TABLET, EXTENDED RELEASE ORAL 2 TIMES DAILY
Status: DISCONTINUED | OUTPATIENT
Start: 2023-04-21 | End: 2023-04-24 | Stop reason: HOSPADM

## 2023-04-21 RX ORDER — HYDROMORPHONE HYDROCHLORIDE 2 MG/1
2 TABLET ORAL
Status: DISCONTINUED | OUTPATIENT
Start: 2023-04-21 | End: 2023-04-24 | Stop reason: HOSPADM

## 2023-04-21 RX ORDER — ROSUVASTATIN CALCIUM 10 MG/1
10 TABLET, COATED ORAL DAILY
Status: DISCONTINUED | OUTPATIENT
Start: 2023-04-21 | End: 2023-04-24 | Stop reason: HOSPADM

## 2023-04-21 RX ORDER — HYDROMORPHONE HYDROCHLORIDE 1 MG/ML
0.5 INJECTION, SOLUTION INTRAMUSCULAR; INTRAVENOUS; SUBCUTANEOUS
Status: COMPLETED | OUTPATIENT
Start: 2023-04-21 | End: 2023-04-21

## 2023-04-21 RX ORDER — AMOXICILLIN 250 MG
1 CAPSULE ORAL 2 TIMES DAILY PRN
Status: DISCONTINUED | OUTPATIENT
Start: 2023-04-21 | End: 2023-04-24 | Stop reason: HOSPADM

## 2023-04-21 RX ORDER — AMOXICILLIN 250 MG
2 CAPSULE ORAL 2 TIMES DAILY
Status: DISCONTINUED | OUTPATIENT
Start: 2023-04-21 | End: 2023-04-24 | Stop reason: HOSPADM

## 2023-04-21 RX ORDER — ALBUTEROL SULFATE 0.83 MG/ML
1.25 SOLUTION RESPIRATORY (INHALATION) EVERY 4 HOURS PRN
Status: DISCONTINUED | OUTPATIENT
Start: 2023-04-21 | End: 2023-04-24 | Stop reason: HOSPADM

## 2023-04-21 RX ORDER — PREDNISONE 1 MG/1
2 TABLET ORAL DAILY
Status: DISCONTINUED | OUTPATIENT
Start: 2023-04-22 | End: 2023-04-24 | Stop reason: HOSPADM

## 2023-04-21 RX ORDER — SODIUM CHLORIDE 9 MG/ML
INJECTION, SOLUTION INTRAVENOUS CONTINUOUS
Status: DISCONTINUED | OUTPATIENT
Start: 2023-04-21 | End: 2023-04-21

## 2023-04-21 RX ORDER — HYDROXYZINE HYDROCHLORIDE 10 MG/1
10 TABLET, FILM COATED ORAL 3 TIMES DAILY PRN
Status: DISCONTINUED | OUTPATIENT
Start: 2023-04-21 | End: 2023-04-24 | Stop reason: HOSPADM

## 2023-04-21 RX ORDER — ACETAMINOPHEN 325 MG/1
975 TABLET ORAL EVERY 8 HOURS
Status: DISCONTINUED | OUTPATIENT
Start: 2023-04-21 | End: 2023-04-24 | Stop reason: HOSPADM

## 2023-04-21 RX ORDER — HYDROMORPHONE HYDROCHLORIDE 1 MG/ML
0.3 INJECTION, SOLUTION INTRAMUSCULAR; INTRAVENOUS; SUBCUTANEOUS
Status: DISCONTINUED | OUTPATIENT
Start: 2023-04-21 | End: 2023-04-24 | Stop reason: HOSPADM

## 2023-04-21 RX ORDER — ONDANSETRON 4 MG/1
4 TABLET, ORALLY DISINTEGRATING ORAL EVERY 6 HOURS PRN
Status: DISCONTINUED | OUTPATIENT
Start: 2023-04-21 | End: 2023-04-24 | Stop reason: HOSPADM

## 2023-04-21 RX ORDER — FLUTICASONE FUROATE AND VILANTEROL 100; 25 UG/1; UG/1
1 POWDER RESPIRATORY (INHALATION) DAILY
Status: DISCONTINUED | OUTPATIENT
Start: 2023-04-22 | End: 2023-04-21 | Stop reason: ALTCHOICE

## 2023-04-21 RX ADMIN — DULOXETINE HYDROCHLORIDE 20 MG: 20 CAPSULE, DELAYED RELEASE ORAL at 21:19

## 2023-04-21 RX ADMIN — SENNOSIDES AND DOCUSATE SODIUM 1 TABLET: 50; 8.6 TABLET ORAL at 20:08

## 2023-04-21 RX ADMIN — AMLODIPINE BESYLATE 5 MG: 5 TABLET ORAL at 21:19

## 2023-04-21 RX ADMIN — IOPAMIDOL 82 ML: 755 INJECTION, SOLUTION INTRAVENOUS at 11:45

## 2023-04-21 RX ADMIN — ROSUVASTATIN CALCIUM 10 MG: 10 TABLET, FILM COATED ORAL at 20:09

## 2023-04-21 RX ADMIN — ACETAMINOPHEN 975 MG: 325 TABLET ORAL at 20:08

## 2023-04-21 RX ADMIN — HYDROMORPHONE HYDROCHLORIDE 2 MG: 2 TABLET ORAL at 21:19

## 2023-04-21 RX ADMIN — HYDROMORPHONE HYDROCHLORIDE 0.5 MG: 1 INJECTION, SOLUTION INTRAMUSCULAR; INTRAVENOUS; SUBCUTANEOUS at 12:26

## 2023-04-21 RX ADMIN — HYDROMORPHONE HYDROCHLORIDE 0.5 MG: 1 INJECTION, SOLUTION INTRAMUSCULAR; INTRAVENOUS; SUBCUTANEOUS at 16:43

## 2023-04-21 RX ADMIN — POTASSIUM CHLORIDE 20 MEQ: 1500 TABLET, EXTENDED RELEASE ORAL at 20:09

## 2023-04-21 RX ADMIN — SODIUM CHLORIDE: 9 INJECTION, SOLUTION INTRAVENOUS at 10:52

## 2023-04-21 RX ADMIN — HYDROMORPHONE HYDROCHLORIDE 0.5 MG: 1 INJECTION, SOLUTION INTRAMUSCULAR; INTRAVENOUS; SUBCUTANEOUS at 10:53

## 2023-04-21 RX ADMIN — Medication 1 MG: at 21:19

## 2023-04-21 RX ADMIN — FUROSEMIDE 20 MG: 20 TABLET ORAL at 20:09

## 2023-04-21 RX ADMIN — SODIUM CHLORIDE 65 ML: 9 INJECTION, SOLUTION INTRAVENOUS at 11:45

## 2023-04-21 RX ADMIN — Medication 25 MCG: at 20:08

## 2023-04-21 ASSESSMENT — ACTIVITIES OF DAILY LIVING (ADL)
NUMBER_OF_TIMES_PATIENT_HAS_FALLEN_WITHIN_LAST_SIX_MONTHS: 2
ADLS_ACUITY_SCORE: 35
CHANGE_IN_FUNCTIONAL_STATUS_SINCE_ONSET_OF_CURRENT_ILLNESS/INJURY: YES
WALKING_OR_CLIMBING_STAIRS: OTHER (SEE COMMENTS)
ADLS_ACUITY_SCORE: 35
ADLS_ACUITY_SCORE: 24
FALL_HISTORY_WITHIN_LAST_SIX_MONTHS: YES
WALKING_OR_CLIMBING_STAIRS_DIFFICULTY: NO
DRESSING/BATHING_DIFFICULTY: NO
TRANSFERRING: 0-->INDEPENDENT
DOING_ERRANDS_INDEPENDENTLY_DIFFICULTY: NO
ADLS_ACUITY_SCORE: 35
TOILETING_ISSUES: NO
TRANSFERRING: 0-->INDEPENDENT
ADLS_ACUITY_SCORE: 35
CONCENTRATING,_REMEMBERING_OR_MAKING_DECISIONS_DIFFICULTY: NO
DIFFICULTY_EATING/SWALLOWING: NO
EQUIPMENT_CURRENTLY_USED_AT_HOME: CANE, STRAIGHT
ADLS_ACUITY_SCORE: 18
WEAR_GLASSES_OR_BLIND: YES
ADLS_ACUITY_SCORE: 35

## 2023-04-21 NOTE — TELEPHONE ENCOUNTER
Writer huddled with ADS provider and nurse. Provider requesting for Dr. Dozier to please call ADS to to further discuss and speak with ADS directly ([phone])- routing to PCP to please review - thank you!    Ana Gong RN  [facility]

## 2023-04-21 NOTE — PROGRESS NOTES
RECEIVING UNIT ED HANDOFF REVIEW    ED Nurse Handoff Report was reviewed by: Lucie Fajardo RN on April 21, 2023 at 5:59 PM

## 2023-04-21 NOTE — ED TRIAGE NOTES
Pt fell 2 weeks ago and injured left ribs, pain continues. Pt is on one lpn NC at night for COPD.  Pt. States pain is 8/10 with movement.    Triage Assessment     Row Name 04/21/23 1027       Triage Assessment (Adult)    Airway WDL WDL       Respiratory WDL    Respiratory WDL X  left side rib pain       Skin Circulation/Temperature WDL    Skin Circulation/Temperature WDL WDL       Cardiac WDL    Cardiac WDL X  HTN       Peripheral/Neurovascular WDL    Peripheral Neurovascular WDL WDL       Cognitive/Neuro/Behavioral WDL    Cognitive/Neuro/Behavioral WDL WDL
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
## 2023-04-21 NOTE — ED NOTES
[facility]  ED Nurse Handoff Report    ED Chief complaint: Fall and Shortness of Breath      ED Diagnosis:   Final diagnoses:   Fall, initial encounter   Closed fracture of one rib of left side, initial encounter   Spleen hematoma, initial encounter   Hypoxia - With ambulation       Code Status: hospitalist to address    Allergies:   Allergies   Allergen Reactions    Levaquin Difficulty breathing    Plavix [Clopidogrel Bisulfate] Itching    Atorvastatin Calcium Cramps     lipitor    Cats     Dogs     Hctz [Hydrochlorothiazide]      Rash on legs    Sulfasalazine Other (See Comments)     Stomach cramps        Patient Story: Pt fell 2 weeks ago and injured left ribs, pain continues. Pt is on one lpn NC at night for COPD.  Pt. States pain is 8/10 with movement.  Focused Assessment:  Pt has left sided flank and rib pain. Pt has shallow tachycardic breathing. Pt is alert and oriented. Respirations are even, easy, and unlabored. Pt is on 2L NC    Treatments and/or interventions provided: Pt given 2L NC. IV pain medications and CT scan  Patient's response to treatments and/or interventions: Pt reports pain decreased to  manageable 4/10    To be done/followed up on inpatient unit:  inpatient orders    Does this patient have any cognitive concerns?:  NA    Activity level - Baseline/Home:  Independent  Activity Level - Current:   Stand with Assist    Patient's Preferred language: English   Needed?: No    Isolation: None  Infection: Not Applicable  Patient tested for COVID 19 prior to admission: NO  Bariatric?: No    Vital Signs:   Vitals:    04/21/23 1025 04/21/23 1131 04/21/23 1139 04/21/23 1303   BP: (!) 165/75      Pulse: 53      Resp: 20      Temp: (!) 96.6  F (35.9  C)      TempSrc: Temporal      SpO2: 97% (!) 87% 94% 95%   Weight: 74.4 kg (164 lb)          Cardiac Rhythm:     Was the PSS-3 completed:   Yes  What interventions are required if any?               Family Comments:   OBS  brochure/video discussed/provided to patient/family: N/A              Name of person given brochure if not patient:               Relationship to patient:     For the majority of the shift this patient's behavior was Green.   Behavioral interventions performed were .    ED NURSE PHONE NUMBER: *[phone]

## 2023-04-21 NOTE — PHARMACY-ADMISSION MEDICATION HISTORY
Pharmacist Admission Medication History    Admission medication history is complete. The information provided in this note is only as accurate as the sources available at the time of the update.    Medication reconciliation/reorder completed by provider prior to medication history? No    Information Source(s): Family member and CareEverywhere/SureScripts via phone   --  Attempted to interview pt in-person but he doesn't know his medications.  Requested that I call his wife.  Spoke with pt's wife, Adeola, via phone, [phone].    Pertinent Information:  --  Pt's wife will bring in albuterol and Trelegy Ellipta inhalers.  --  Pt was taking azithromycin 500 mg po daily x 7 days last week.  Pt bump this up to daily when his COPD gets worse.  He is back to every other day this week.  There is no stop date for this per pt's wife.  --  Pt's wife reported he has been taking oxycodone prn daily but unsure how many per day as patient takes this as he needs it.  --  Pt is taking KCl only once with lunch but pt's wife said it's prescribed BID and pt likely does need to take it BID.  --  Pt is on prednisone and Azithromycin for COPD.  --  Confirmed that pt is not on lisinopril.    Medication Affordability:  Not including over the counter (OTC) medications, was there a time in the past 12 months when you did not take your medications as prescribed because of cost?: Unable to Assess    Allergies reviewed with patient and updates made in EHR: completed by RN.    Medication History Completed By: Kim Chand, Julio César 4/21/2023 2:35 PM    Prior to Admission medications    Medication Sig Last Dose Taking? Auth Provider Long Term End Date   albuterol (ACCUNEB) 1.25 MG/3ML neb solution Take 1 vial (1.25 mg) by nebulization every 4 hours as needed for shortness of breath or wheezing prn Yes Khoa Handy MD Yes    albuterol (PROAIR HFA/PROVENTIL HFA/VENTOLIN HFA) 108 (90 Base) MCG/ACT inhaler INHALE 2 PUFFS BY MOUTH EVERY 6 HOURS AS  NEEDED FOR WHEEZE OR FOR SHORTNESS OF BREATH prn Yes Yaneli Dozier MD Yes    amLODIPine (NORVASC) 5 MG tablet Take 5 mg by mouth At Bedtime 4/20/2023 at hs Yes Yaneli Dozier MD Yes    ascorbic acid 1000 MG TABS tablet Take 1,000 mg by mouth daily 4/21/2023 at am Yes Unknown, Entered By History     aspirin 81 MG tablet Take 1 tablet by mouth 2 times daily 4/21/2023 at am Yes Reported, Patient     azithromycin (ZITHROMAX) 500 MG tablet Take 1 tablet (500 mg) by mouth every other day 4/20/2023 at am Yes Yaneli Dozier MD     calcium carbonate (OS-KARLIE) 1500 (600 Ca) MG tablet Take 600 mg by mouth 2 times daily (with meals) 4/21/2023 at am Yes Unknown, Entered By History     cholecalciferol 25 MCG (1000 UT) TABS Take 1,000 Units by mouth daily  4/20/2023 at lunch Yes Reported, Patient     DULoxetine (CYMBALTA) 20 MG capsule Take 1 capsule (20 mg) by mouth 2 times daily  Patient taking differently: Take 20 mg by mouth At Bedtime 4/20/2023 Yes Yaneli Dozier MD Yes    Fluticasone-Umeclidin-Vilanterol (TRELEGY ELLIPTA) 100-62.5-25 MCG/INH oral inhaler Inhale 1 puff into the lungs daily 4/21/2023 at am Yes Yaneli Dozier MD     furosemide (LASIX) 20 MG tablet Take 1 tablet (20 mg) by mouth 2 times daily In morning and at noon 4/21/2023 at am Yes Yaneli Dozier MD Yes    guaiFENesin (MUCINEX) 600 MG 12 hr tablet Take 600 mg by mouth 2 times daily as needed for congestion prn Yes Reported, Patient     hydrOXYzine (ATARAX) 10 MG tablet Take 1 tablet (10 mg) by mouth 3 times daily as needed for itching prn Yes Yaneli Dozier MD     levothyroxine (SYNTHROID/LEVOTHROID) 137 MCG tablet Take 1 tablet (137 mcg) by mouth daily , starting on 9/23/21 4/21/2023 at am Yes Demarcus Simmons MD Yes    MegaRed Omega-3 Krill Oil 500 MG CAPS Take 500 mg by mouth daily lunch 4/20/2023 at lunch Yes Yaneli Dozier MD     Melatonin 10 MG TABS tablet Take 10 mg by mouth At Bedtime 4/20/2023 at hs Yes Yaneli Dozier MD     metoprolol succinate ER  (TOPROL XL) 50 MG 24 hr tablet Take 50 mg by mouth daily 4/21/2023 at am Yes Yaneli Dozier MD Yes    multivitamin w/minerals (THERA-VIT-M) tablet Take 1 tablet by mouth daily  4/20/2023 at lunch Yes Reported, Patient     naloxone (NARCAN) 4 MG/0.1ML nasal spray Spray 1 spray (4 mg) into one nostril alternating nostrils once as needed for opioid reversal every 2-3 minutes until assistance arrives prn Yes Yaneli Dozier MD Yes    nitroGLYcerin (NITROSTAT) 0.4 MG sublingual tablet FOR CHEST PAIN PLACE 1 TAB UNDER TONGUE EVERY 5 MIN FOR 3 DOSES. IF SYMPTOMS PERSIST 5 MIN AFTER 1ST DOSE CALL 911 prn Yes Yaneli Dozier MD Yes    oxyCODONE (ROXICODONE) 5 MG tablet Take 1 tablet (5 mg) by mouth 3 times daily as needed for pain  Yes Yaneli Dozier MD No    potassium chloride ER (KLOR-CON M) 20 MEQ CR tablet Take 1 tablet (20 mEq) by mouth 2 times daily  Patient taking differently: Take 20 mEq by mouth daily (with lunch) Prescribed as twice daily but pt is only taking it daily at lunch. 4/20/2023 Yes Yaneli Dozier MD     predniSONE (DELTASONE) 1 MG tablet Take 2 tablets (2 mg) by mouth daily  Patient taking differently: Take 2 mg by mouth daily For COPD 4/21/2023 at am Yes Yaneli Dozier MD No    rosuvastatin (CRESTOR) 10 MG tablet TAKE 1 TABLET BY MOUTH EVERY DAY 4/20/2023 Yes Yaneli Dozier MD Yes    senna-docusate (SENOKOT-S/PERICOLACE) 8.6-50 MG tablet Take 1 tablet by mouth 2 times daily as needed for constipation prn Yes Unknown, Entered By History

## 2023-04-21 NOTE — TELEPHONE ENCOUNTER
Huddled with ADS who states ADS provider spoke directly with Dr. Dozier.    Routing to PCP to clarify if recommendation is for patient to be seen in urgent care?    Thank you!    Ana Gong RN  [facility]

## 2023-04-21 NOTE — TELEPHONE ENCOUNTER
Patient states that he will go to  at [place] this morning.   Asked that a message be sent to see if there is any way Dr. Dozeir could work him in for an appointment today.   Reviewed schedule and told patient doctor was full.  Patient asked that message be routed to Dr. Dozier.   Silvia Durham RN

## 2023-04-22 ENCOUNTER — APPOINTMENT (OUTPATIENT)
Dept: PHYSICAL THERAPY | Facility: CLINIC | Age: 78
DRG: 206 | End: 2023-04-22
Attending: INTERNAL MEDICINE
Payer: MEDICARE

## 2023-04-22 LAB
ANION GAP SERPL CALCULATED.3IONS-SCNC: 8 MMOL/L (ref 7–15)
BUN SERPL-MCNC: 17.6 MG/DL (ref 8–23)
CALCIUM SERPL-MCNC: 9 MG/DL (ref 8.8–10.2)
CHLORIDE SERPL-SCNC: 100 MMOL/L (ref 98–107)
CREAT SERPL-MCNC: 1.01 MG/DL (ref 0.67–1.17)
DEPRECATED HCO3 PLAS-SCNC: 30 MMOL/L (ref 22–29)
ERYTHROCYTE [DISTWIDTH] IN BLOOD BY AUTOMATED COUNT: 14.7 % (ref 10–15)
GFR SERPL CREATININE-BSD FRML MDRD: 78 ML/MIN/1.73M2
GLUCOSE SERPL-MCNC: 84 MG/DL (ref 70–99)
HCT VFR BLD AUTO: 46.6 % (ref 40–53)
HGB BLD-MCNC: 15 G/DL (ref 13.3–17.7)
MCH RBC QN AUTO: 28.8 PG (ref 26.5–33)
MCHC RBC AUTO-ENTMCNC: 32.2 G/DL (ref 31.5–36.5)
MCV RBC AUTO: 89 FL (ref 78–100)
PLATELET # BLD AUTO: 162 10E3/UL (ref 150–450)
POTASSIUM SERPL-SCNC: 4.5 MMOL/L (ref 3.4–5.3)
RBC # BLD AUTO: 5.21 10E6/UL (ref 4.4–5.9)
SODIUM SERPL-SCNC: 138 MMOL/L (ref 136–145)
WBC # BLD AUTO: 9 10E3/UL (ref 4–11)

## 2023-04-22 PROCEDURE — 250N000013 HC RX MED GY IP 250 OP 250 PS 637: Performed by: SURGERY

## 2023-04-22 PROCEDURE — 97161 PT EVAL LOW COMPLEX 20 MIN: CPT | Mod: GP

## 2023-04-22 PROCEDURE — 85027 COMPLETE CBC AUTOMATED: CPT | Performed by: INTERNAL MEDICINE

## 2023-04-22 PROCEDURE — 36415 COLL VENOUS BLD VENIPUNCTURE: CPT | Performed by: INTERNAL MEDICINE

## 2023-04-22 PROCEDURE — 250N000013 HC RX MED GY IP 250 OP 250 PS 637: Performed by: INTERNAL MEDICINE

## 2023-04-22 PROCEDURE — 82310 ASSAY OF CALCIUM: CPT | Performed by: INTERNAL MEDICINE

## 2023-04-22 PROCEDURE — 250N000012 HC RX MED GY IP 250 OP 636 PS 637: Performed by: INTERNAL MEDICINE

## 2023-04-22 PROCEDURE — 97116 GAIT TRAINING THERAPY: CPT | Mod: GP

## 2023-04-22 PROCEDURE — 99221 1ST HOSP IP/OBS SF/LOW 40: CPT | Performed by: SURGERY

## 2023-04-22 PROCEDURE — 120N000001 HC R&B MED SURG/OB

## 2023-04-22 RX ORDER — CYCLOBENZAPRINE HCL 10 MG
10 TABLET ORAL 3 TIMES DAILY
Status: DISCONTINUED | OUTPATIENT
Start: 2023-04-22 | End: 2023-04-24 | Stop reason: HOSPADM

## 2023-04-22 RX ORDER — LIDOCAINE 4 G/G
2 PATCH TOPICAL
Status: DISCONTINUED | OUTPATIENT
Start: 2023-04-22 | End: 2023-04-24 | Stop reason: HOSPADM

## 2023-04-22 RX ADMIN — POTASSIUM CHLORIDE 20 MEQ: 1500 TABLET, EXTENDED RELEASE ORAL at 21:11

## 2023-04-22 RX ADMIN — PREDNISONE 2 MG: 1 TABLET ORAL at 08:19

## 2023-04-22 RX ADMIN — HYDROMORPHONE HYDROCHLORIDE 2 MG: 2 TABLET ORAL at 06:46

## 2023-04-22 RX ADMIN — Medication 25 MCG: at 08:19

## 2023-04-22 RX ADMIN — HYDROMORPHONE HYDROCHLORIDE 2 MG: 2 TABLET ORAL at 19:17

## 2023-04-22 RX ADMIN — HYDROMORPHONE HYDROCHLORIDE 2 MG: 2 TABLET ORAL at 16:02

## 2023-04-22 RX ADMIN — ACETAMINOPHEN 975 MG: 325 TABLET ORAL at 19:16

## 2023-04-22 RX ADMIN — LEVOTHYROXINE SODIUM 137 MCG: 137 TABLET ORAL at 06:47

## 2023-04-22 RX ADMIN — POTASSIUM CHLORIDE 20 MEQ: 1500 TABLET, EXTENDED RELEASE ORAL at 08:19

## 2023-04-22 RX ADMIN — HYDROMORPHONE HYDROCHLORIDE 2 MG: 2 TABLET ORAL at 03:49

## 2023-04-22 RX ADMIN — METOPROLOL SUCCINATE 50 MG: 50 TABLET, EXTENDED RELEASE ORAL at 08:19

## 2023-04-22 RX ADMIN — CYCLOBENZAPRINE 10 MG: 10 TABLET, FILM COATED ORAL at 16:02

## 2023-04-22 RX ADMIN — AMLODIPINE BESYLATE 5 MG: 5 TABLET ORAL at 21:11

## 2023-04-22 RX ADMIN — CYCLOBENZAPRINE 10 MG: 10 TABLET, FILM COATED ORAL at 21:10

## 2023-04-22 RX ADMIN — SENNOSIDES AND DOCUSATE SODIUM 2 TABLET: 50; 8.6 TABLET ORAL at 21:10

## 2023-04-22 RX ADMIN — FUROSEMIDE 20 MG: 20 TABLET ORAL at 08:19

## 2023-04-22 RX ADMIN — Medication 1 MG: at 21:11

## 2023-04-22 RX ADMIN — FUROSEMIDE 20 MG: 20 TABLET ORAL at 16:02

## 2023-04-22 RX ADMIN — ROSUVASTATIN CALCIUM 10 MG: 10 TABLET, FILM COATED ORAL at 08:19

## 2023-04-22 RX ADMIN — DULOXETINE HYDROCHLORIDE 20 MG: 20 CAPSULE, DELAYED RELEASE ORAL at 21:10

## 2023-04-22 RX ADMIN — ACETAMINOPHEN 975 MG: 325 TABLET ORAL at 10:59

## 2023-04-22 RX ADMIN — LIDOCAINE 2 PATCH: 560 PATCH PERCUTANEOUS; TOPICAL; TRANSDERMAL at 09:49

## 2023-04-22 RX ADMIN — HYDROMORPHONE HYDROCHLORIDE 2 MG: 2 TABLET ORAL at 09:55

## 2023-04-22 RX ADMIN — SENNOSIDES AND DOCUSATE SODIUM 1 TABLET: 50; 8.6 TABLET ORAL at 08:19

## 2023-04-22 RX ADMIN — HYDROMORPHONE HYDROCHLORIDE 2 MG: 2 TABLET ORAL at 00:30

## 2023-04-22 RX ADMIN — ACETAMINOPHEN 975 MG: 325 TABLET ORAL at 03:49

## 2023-04-22 RX ADMIN — CYCLOBENZAPRINE 10 MG: 10 TABLET, FILM COATED ORAL at 09:50

## 2023-04-22 ASSESSMENT — ACTIVITIES OF DAILY LIVING (ADL)
ADLS_ACUITY_SCORE: 24
ADLS_ACUITY_SCORE: 25
ADLS_ACUITY_SCORE: 26
ADLS_ACUITY_SCORE: 24
ADLS_ACUITY_SCORE: 26
ADLS_ACUITY_SCORE: 23
ADLS_ACUITY_SCORE: 23
ADLS_ACUITY_SCORE: 25
ADLS_ACUITY_SCORE: 26
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
## 2023-04-22 NOTE — CONSULTS
General surgery note    Mr. Thomas fell on his kitchen 2 weeks ago.  Because of worsening pain despite his usual take of narcotics he comes in for evaluation.  He is found to have splenic hematoma, resolving, and a left rib fracture.  He has a history of emphysema and uses home oxygen.    Currently he is sitting in bed moving around with some pain.  He is afebrile, his heart rate is in the 60s.  He is on 2 L of oxygen delivered by nasal cannula.  Tenderness in the left upper quadrant of the abdomen left lower chest area.    Assessment and plan  Fall leading to left rib fracture and splenic hematoma.  Discussed again the importance of increase mobility and incentive spirometer.  We will add some lidocaine patches, muscle relaxant, and ice to help with the pain.  No surgical intervention.        Total encounter time 35 minutes more than half spent in counseling and review of data.

## 2023-04-22 NOTE — PROGRESS NOTES
04/22/23 1435   Appointment Info   Signing Clinician's Name / Credentials (PT) Henna Pantoja DPT   Living Environment   People in Home spouse   Current Living Arrangements house   Home Accessibility stairs to enter home;stairs within home   Number of Stairs, Main Entrance 1   Number of Stairs, Within Home, Primary greater than 10 stairs   Stair Railings, Within Home, Primary railings safe and in good condition   Transportation Anticipated car, drives self;family or friend will provide   Living Environment Comments Pt lives with his wife, all needs can be met on main level, stairs are to basement   Self-Care   Usual Activity Tolerance good   Current Activity Tolerance moderate   Equipment Currently Used at Home   (walking stick)   Fall history within last six months yes   Number of times patient has fallen within last six months 2   Activity/Exercise/Self-Care Comment Pt IND at baseline, occasionally uses a walking stick   General Information   Onset of Illness/Injury or Date of Surgery 04/24/23   Referring Physician Richy Bobby MD   Patient/Family Therapy Goals Statement (PT) Return home   Pertinent History of Current Problem (include personal factors and/or comorbidities that impact the POC) Per chart: Harrison Thomas is a 77 year old male with a history of COPD on oxygen only at night 1 L who presents with left lateral lower rib pain and upper abdominal pain since a fall 2 weeks ago   Existing Precautions/Restrictions fall   General Observations Pt supine in bed upon therapist arrival, agreeable to PT   Cognition   Affect/Mental Status (Cognition) WFL   Orientation Status (Cognition) oriented x 4   Follows Commands (Cognition) WFL   Pain Assessment   Patient Currently in Pain   (5/10 L ribs)   Integumentary/Edema   Integumentary/Edema no deficits were identifed   Posture    Posture Forward head position;Protracted shoulders   Range of Motion (ROM)   Range of Motion ROM is WFL   Strength (Manual  Muscle Testing)   Strength (Manual Muscle Testing) Deficits observed during functional mobility;Able to perform R SLR;Able to perform L SLR   Bed Mobility   Comment, (Bed Mobility) Supine to sit IND   Transfers   Comment, (Transfers) Sit to stand SBA   Gait/Stairs (Locomotion)   Comment, (Gait/Stairs) Pt amb w/ walking stick and SBA   Balance   Balance Comments Fair seated and standing w/ walking stick   Sensory Examination   Sensory Perception patient reports no sensory changes   Clinical Impression   Criteria for Skilled Therapeutic Intervention Yes, treatment indicated   PT Diagnosis (PT) Impaired functional mobility and gait   Influenced by the following impairments Pain, weakness, decreased activity tolerance, impaired balance SOB   Functional limitations due to impairments Limited functional mobility requiring AD and assist   Clinical Presentation (PT Evaluation Complexity) Stable/Uncomplicated   Clinical Presentation Rationale Based on PMH, current status, and social support   Clinical Decision Making (Complexity) low complexity   Planned Therapy Interventions (PT) balance training;bed mobility training;gait training;stair training;strengthening;transfer training;progressive activity/exercise   Risk & Benefits of therapy have been explained evaluation/treatment results reviewed;care plan/treatment goals reviewed;risks/benefits reviewed;current/potential barriers reviewed;participants voiced agreement with care plan;participants included;patient   PT Total Evaluation Time   PT Eval, Low Complexity Minutes (36782) 10   Physical Therapy Goals   PT Frequency Daily   PT Predicted Duration/Target Date for Goal Attainment 04/25/23   PT Goals Bed Mobility;Transfers;Gait;Stairs   PT: Bed Mobility Independent;Supine to/from sit   PT: Transfers Modified independent;Sit to/from stand;Assistive device   PT: Gait Modified independent;Assistive device;Greater than 200 feet   PT: Stairs Supervision/stand-by assist;1 stair    Interventions   Interventions Quick Adds Gait Training   Gait Training   Gait Training Minutes (89760) 18   Symptoms Noted During/After Treatment (Gait Training) fatigue;increased pain;shortness of breath   Treatment Detail/Skilled Intervention Pt refused gait belt, has also been refusing w/ nursing. Pt took off O2, declined to wear during amb. Pt amb 80' w/ walking stick and SBA. Pt demos fair speed and stability. Pt navigated 2 stairs w/ railing, walking stick, and CGA. Pt w/ one mild LOB, requiring Min A to correct. VCs for sequencing and to slow down. Pt impulsive throughout session, required repeated cues for safety. Pt amb 200' w/ walking stick and SBA. At one point pt holding walking stick in the air, walking w/out support. VCs for safety. Pt sat EOB, doffed O2 at 2L, SpO2 85%, pt required 45 seconds to bump up to 90%. Refused bed alarm, all needs w/in reach, RN updated.   PT Discharge Planning   PT Plan Progress amb w/ walking stick, stairs, balance. Monitor SOB   PT Discharge Recommendation (DC Rec) home with assist   PT Rationale for DC Rec Pt is below baseline, currently Ax1 for mobility. Pt limited by pain and SOB. Pt has good support from his wife. Anticipate w/ further IP PT, pt will meet goals. Recommend pt use AD for all mobility   PT Brief overview of current status Bed mob SBA, transfers SBA, amb SBA, stairs CGA-Min A   Total Session Time   Timed Code Treatment Minutes 18   Total Session Time (sum of timed and untimed services) 28

## 2023-04-22 NOTE — PLAN OF CARE
Shift Note: Received from ED at 1830 hours, AXOX4, VSS, maintaining 94% saturation with 3L O2 via NC, tried to wean to 2L, but SpO2 went down to 88%. R PIV SL. Constant pain at L side of chest and abdomen, managed with PRN Hydromorphone. Scattered bruises on both hands and left side of abdomen. On regular diet. Trace edema on BLE. SBA with cane for transfers.  Pt itches and noted rashes on lower abdomen and R upper thigh. Its not a new concern. Has intermittent cough,SOB on exertion .Pharmacy approved for his inhaler and kept in the cabinet. CBC and BMP draw tomorrow. Discharge plan pending.

## 2023-04-22 NOTE — UTILIZATION REVIEW
Admission Status; Secondary Review Determination       Under the authority of the Utilization Management Committee, the utilization review process indicated a secondary review on the above patient. The review outcome is based on review of the medical records, discussions with staff, and applying clinical experience noted on the date of the review.     (x) Inpatient Status Appropriate - This patient's medical care is consistent with medical management for inpatient care and reasonable inpatient medical practice.     RATIONALE FOR DETERMINATION     Patient requires inpatient admission versus short stay observation or outpatient treatment for the following reasons:  77 year old male with pmh of copd who had a fall 2 weeks ago but seeks medical attention now due to chest pain, found to have rib fracture of the left 6th rib and small splenic hematoma. Surgery recommends medical management. He is having quite a bit of pain control issues with 3 dosages of IV narcotics over the last 24 hours. He is needing 2 liters of oxygen to maintain adequate SpO2 and was started on prednisone for an exacerbation of copd. Given worsening respiratory failure and planned discharge potentially tomorrow, would continue inpatient status.     The expected length of stay at the time of admission was more than 2 nights because of the severity of illness, intensity of service provided, and risk for adverse outcome. Inpatient admission is appropriate.         This document was produced using voice recognition software       The information on this document is developed by the utilization review team in order for the business office to ensure compliance. This only denotes the appropriateness of proper admission status and does not reflect the quality of care rendered.   The definitions of Inpatient Status and Observation Status used in making the determination above are those provided in the CMS Coverage Manual, Chapter 1 and Chapter 6, section  70.4.   Sincerely,   Marc Hancock DO  Physician Advisor  [facility].

## 2023-04-22 NOTE — PLAN OF CARE
VSS, A/O, ambulating SBA with cane, refuses bed alarm and gait belt.  Weaned O2 to 2L.  R PIV SL. Pain to lower left flank and mid-lower abdomen, felt mostly with movement, moderately well controlled with PO Dilaudid PRN.  Surgery also ordered lido patches and cyclobenzaprine.  Pruritic rash to abdomen, groin and thighs, MD notified to please assess when rounding.

## 2023-04-22 NOTE — PLAN OF CARE
Goal Outcome Evaluation: 2140-4028    A&O x4, calm and cooperative. SBA w/ cane (refuses gait belt).  VSS on 3L NC, attempted to wean down to 2, upper 80's.  Initial instruction on IS given, pt able to reach 1500. Dyspnea upon exertion improving. Left lower quadrant pain controlled with PO Dilaudid q3 and scheduled Tylenol. Voiding spontaneously with adequate output, measuring due to Lasix. Regular diet.     Surgery consulted for this am.      Is This A New Presentation, Or A Follow-Up?: Skin Lesion

## 2023-04-22 NOTE — PROVIDER NOTIFICATION
MD Notification    Notified Person: MD    Notified Person Name: Dr Bobby    Notification Date/Time:04/21/23@9730    Notification Interaction: Page via [employer]    Purpose of Notification:Pt asking for Dilaudid oral for pain at 8/10. He refused to take Oxycodone.      Orders Received: Discontinue Oxycodone, Hydromorphone 2 mg PO Q3H PRN     Comments:

## 2023-04-23 ENCOUNTER — APPOINTMENT (OUTPATIENT)
Dept: GENERAL RADIOLOGY | Facility: CLINIC | Age: 78
DRG: 206 | End: 2023-04-23
Payer: MEDICARE

## 2023-04-23 ENCOUNTER — APPOINTMENT (OUTPATIENT)
Dept: PHYSICAL THERAPY | Facility: CLINIC | Age: 78
DRG: 206 | End: 2023-04-23
Payer: MEDICARE

## 2023-04-23 LAB — GLUCOSE BLDC GLUCOMTR-MCNC: 151 MG/DL (ref 70–99)

## 2023-04-23 PROCEDURE — 99232 SBSQ HOSP IP/OBS MODERATE 35: CPT | Performed by: HOSPITALIST

## 2023-04-23 PROCEDURE — 250N000013 HC RX MED GY IP 250 OP 250 PS 637: Performed by: SURGERY

## 2023-04-23 PROCEDURE — 99207 PR NO BILLABLE SERVICE THIS VISIT: CPT

## 2023-04-23 PROCEDURE — 97116 GAIT TRAINING THERAPY: CPT | Mod: GP

## 2023-04-23 PROCEDURE — 120N000001 HC R&B MED SURG/OB

## 2023-04-23 PROCEDURE — 250N000013 HC RX MED GY IP 250 OP 250 PS 637: Performed by: HOSPITALIST

## 2023-04-23 PROCEDURE — 250N000013 HC RX MED GY IP 250 OP 250 PS 637: Performed by: INTERNAL MEDICINE

## 2023-04-23 PROCEDURE — 97530 THERAPEUTIC ACTIVITIES: CPT | Mod: GP

## 2023-04-23 PROCEDURE — 71045 X-RAY EXAM CHEST 1 VIEW: CPT

## 2023-04-23 PROCEDURE — 250N000012 HC RX MED GY IP 250 OP 636 PS 637: Performed by: INTERNAL MEDICINE

## 2023-04-23 RX ORDER — CYCLOBENZAPRINE HCL 10 MG
10 TABLET ORAL 3 TIMES DAILY
Qty: 30 TABLET | Refills: 0 | Status: ON HOLD | OUTPATIENT
Start: 2023-04-23 | End: 2023-06-17

## 2023-04-23 RX ORDER — POTASSIUM CHLORIDE 1500 MG/1
20 TABLET, EXTENDED RELEASE ORAL
Status: ON HOLD
Start: 2023-04-23 | End: 2024-01-10

## 2023-04-23 RX ORDER — PREDNISONE 1 MG/1
2 TABLET ORAL DAILY
Status: ON HOLD
Start: 2023-04-23 | End: 2024-01-10

## 2023-04-23 RX ORDER — ACETAMINOPHEN 325 MG/1
975 TABLET ORAL EVERY 8 HOURS
Status: ON HOLD
Start: 2023-04-23 | End: 2023-06-16

## 2023-04-23 RX ORDER — DULOXETIN HYDROCHLORIDE 20 MG/1
20 CAPSULE, DELAYED RELEASE ORAL AT BEDTIME
Start: 2023-04-23 | End: 2023-10-13

## 2023-04-23 RX ORDER — HYDROCORTISONE 2.5 %
CREAM (GRAM) TOPICAL 2 TIMES DAILY
Qty: 30 G | Refills: 0 | Status: ON HOLD | OUTPATIENT
Start: 2023-04-23 | End: 2023-06-17

## 2023-04-23 RX ORDER — HYDROMORPHONE HYDROCHLORIDE 2 MG/1
2 TABLET ORAL
Qty: 35 TABLET | Refills: 0 | Status: SHIPPED | OUTPATIENT
Start: 2023-04-23 | End: 2023-06-12

## 2023-04-23 RX ORDER — HYDROCORTISONE 2.5 %
CREAM (GRAM) TOPICAL 2 TIMES DAILY
Status: DISCONTINUED | OUTPATIENT
Start: 2023-04-23 | End: 2023-04-24 | Stop reason: HOSPADM

## 2023-04-23 RX ORDER — LIDOCAINE 4 G/G
2 PATCH TOPICAL EVERY 24 HOURS
Qty: 20 PATCH | Refills: 0 | Status: SHIPPED | OUTPATIENT
Start: 2023-04-23 | End: 2023-06-12

## 2023-04-23 RX ORDER — OXYCODONE HYDROCHLORIDE 5 MG/1
5 TABLET ORAL 3 TIMES DAILY PRN
Qty: 90 TABLET | Refills: 0
Start: 2023-04-30 | End: 2023-05-04

## 2023-04-23 RX ADMIN — HYDROMORPHONE HYDROCHLORIDE 2 MG: 2 TABLET ORAL at 16:13

## 2023-04-23 RX ADMIN — FUROSEMIDE 20 MG: 20 TABLET ORAL at 07:37

## 2023-04-23 RX ADMIN — HYDROCORTISONE: 25 CREAM TOPICAL at 21:27

## 2023-04-23 RX ADMIN — HYDROMORPHONE HYDROCHLORIDE 2 MG: 2 TABLET ORAL at 02:56

## 2023-04-23 RX ADMIN — LEVOTHYROXINE SODIUM 137 MCG: 137 TABLET ORAL at 06:19

## 2023-04-23 RX ADMIN — ACETAMINOPHEN 975 MG: 325 TABLET ORAL at 19:18

## 2023-04-23 RX ADMIN — ROSUVASTATIN CALCIUM 10 MG: 10 TABLET, FILM COATED ORAL at 08:08

## 2023-04-23 RX ADMIN — Medication 1 MG: at 21:27

## 2023-04-23 RX ADMIN — SENNOSIDES AND DOCUSATE SODIUM 2 TABLET: 50; 8.6 TABLET ORAL at 08:08

## 2023-04-23 RX ADMIN — HYDROMORPHONE HYDROCHLORIDE 2 MG: 2 TABLET ORAL at 20:25

## 2023-04-23 RX ADMIN — CYCLOBENZAPRINE 10 MG: 10 TABLET, FILM COATED ORAL at 08:08

## 2023-04-23 RX ADMIN — ACETAMINOPHEN 975 MG: 325 TABLET ORAL at 10:06

## 2023-04-23 RX ADMIN — METOPROLOL SUCCINATE 50 MG: 50 TABLET, EXTENDED RELEASE ORAL at 10:04

## 2023-04-23 RX ADMIN — HYDROMORPHONE HYDROCHLORIDE 2 MG: 2 TABLET ORAL at 08:17

## 2023-04-23 RX ADMIN — CYCLOBENZAPRINE 10 MG: 10 TABLET, FILM COATED ORAL at 21:27

## 2023-04-23 RX ADMIN — PREDNISONE 2 MG: 1 TABLET ORAL at 08:08

## 2023-04-23 RX ADMIN — LIDOCAINE 2 PATCH: 560 PATCH PERCUTANEOUS; TOPICAL; TRANSDERMAL at 08:08

## 2023-04-23 RX ADMIN — POTASSIUM CHLORIDE 20 MEQ: 1500 TABLET, EXTENDED RELEASE ORAL at 08:08

## 2023-04-23 RX ADMIN — POTASSIUM CHLORIDE 20 MEQ: 1500 TABLET, EXTENDED RELEASE ORAL at 21:26

## 2023-04-23 RX ADMIN — DULOXETINE HYDROCHLORIDE 20 MG: 20 CAPSULE, DELAYED RELEASE ORAL at 21:26

## 2023-04-23 RX ADMIN — Medication 25 MCG: at 08:08

## 2023-04-23 RX ADMIN — AMLODIPINE BESYLATE 5 MG: 5 TABLET ORAL at 21:26

## 2023-04-23 RX ADMIN — ACETAMINOPHEN 975 MG: 325 TABLET ORAL at 02:55

## 2023-04-23 RX ADMIN — FUROSEMIDE 20 MG: 20 TABLET ORAL at 16:13

## 2023-04-23 RX ADMIN — CYCLOBENZAPRINE 10 MG: 10 TABLET, FILM COATED ORAL at 16:13

## 2023-04-23 ASSESSMENT — ACTIVITIES OF DAILY LIVING (ADL)
ADLS_ACUITY_SCORE: 26
ADLS_ACUITY_SCORE: 21
ADLS_ACUITY_SCORE: 26
ADLS_ACUITY_SCORE: 21
ADLS_ACUITY_SCORE: 21
ADLS_ACUITY_SCORE: 23
ADLS_ACUITY_SCORE: 23
ADLS_ACUITY_SCORE: 21
ADLS_ACUITY_SCORE: 26
ADLS_ACUITY_SCORE: 26

## 2023-04-23 NOTE — PROGRESS NOTES
[facility]    Medicine Progress Note - Hospitalist Service    Date of Admission:  4/21/2023    Assessment & Plan   Harrison Thomas is a 77 year old male admitted on 4/21/2023. He had a fall almost 2 weeks ago and was not followed up for this injury.  His pain was tolerable initially but now has been having more left sided pain with associate splinting.  He is followed by Dr Genao for left sided cancerous lung nodule for which he was undergoing radiation therapy.  His pulmonologist Dr Handy recommended amoxicillin and prednisone course which he completed.  He presents with worsening pain.     Left sided chest-abd pain from fall 2 weeks ago  Left shoulder abrasion  * CT confirmed left 6th rib fx and small splenic hematoma  - oral dilaudid 2 mg every 3 hours  - lidocaine patch x2 during the daytime  - flexeril TID  - incentive spirometry encouraged  -fall on 4/23 when trying to  phone from floor, abrasion to left shoulder     Small splenic hematoma  - conservatively managed  - appreciate gen surg, recommends lidocaine patch, muscle relaxant and ice for pain.     COPD with nocturnal oxygen of 1 L  - continue home O2. Encourage IS  - continue trelegy, nebs, low dose prednisone     HTN  - continue norvasc, metoprolol, lasix     Hypothyroid  - continue synthroid     HLP  - continue crestor     Pruritic Rash to abdomen, groin and thighs  Present x3 weeks at least. Unsure where it started. Seemed to get worse after he had his fall.   - BID hydrocortisone, prescription sent to [facility] pharmacy in [place]       Diet: Regular Diet Adult  Diet    DVT Prophylaxis: Low Risk/Ambulatory with no VTE prophylaxis indicated  Mir Catheter: Not present  Lines: None     Cardiac Monitoring: None  Code Status: No CPR- Do NOT Intubate      Clinically Significant Risk Factors                                Disposition Plan      Expected Discharge Date: 04/24/2023,  3:00 PM                Bridget Philippe  DO  Hospitalist Service  [facility]  Securely message with [employer] (more info)  Text page via [employer] Paging/Directory   ______________________________________________________________________    Interval History   Patient seen and examined. Initial plan was to discharge today to home. Tolerating pain with orals, prescriptions sent. He then had a fall when trying to  a dropped phone off the floor. Got an abrasion after fall. Pain feels about the same. Wife at bedside in afternoon. Switched plan to include discharge on Monday if he feels stable. Reviewed his rash, amenable to trying steroid cream in hospital. Very itchy--he has     Physical Exam   Vital Signs: Temp: 97.9  F (36.6  C) Temp src: Axillary BP: 128/73 Pulse: 84   Resp: 20 SpO2: 94 % O2 Device: Nasal cannula Oxygen Delivery: 1 LPM  Weight: 164 lbs 0 oz    Constitutional:Awake, alert, cooperative, no apparent distress  Respiratory: Clear to auscultation bilaterally, crackles at bases.  Cardiovascular: Regular rate and rhythm, normal S1 and S2, and no murmur noted  GI: Normal bowel sounds, soft, non-distended, non-tender  Skin/Integumen: + rash to back, groin, thigh, lower abdomen, pruritic, blanchable, lacy.  Other:  left shoulder abrasion with dressing covering, some abrasion to lower back from excoriations    Medical Decision Making       35 MINUTES SPENT BY ME on the date of service doing chart review, history, exam, documentation & further activities per the note.      Data         Imaging results reviewed over the past 24 hrs:   Recent Results (from the past 24 hour(s))   XR Chest Port 1 View    Narrative    EXAM: XR CHEST PORTABLE 1 VIEW  LOCATION: [facility]  DATE/TIME: 04/23/2023, 11:11 AM CDT    INDICATION: Status post fall with known sixth rib fracture.  COMPARISON: 01/15/2023.      Impression    IMPRESSION: Infiltrate versus contusion in the right upper lobe is similar to previous. No  pneumothorax. Mildly elevated right hemidiaphragm.

## 2023-04-23 NOTE — PLAN OF CARE
Goal Outcome Evaluation: [id number]    A&O x4. Calm and cooperative. VSS on 2L NC, does well using IS, able to reach 1500. Up SBA w/ cane. PIV SL. Pain controlled with PRN Dilaudid x1 and scheduled Tylenol x1. Rash on bilateral upper thighs, does not itch or bother pt. Lidocaine patch taken off before shift change. Regular diet.

## 2023-04-23 NOTE — PROGRESS NOTES
General surgery progress note    Subjective: Patient feels well this morning.  Upon my arrival to his room, his nurse noted that he had just had a fall while trying to get out of bed to reach his phone which had fallen on the ground.  He did not hit his head.  He does not endorse any increased pain prior to falling.  That hospitalist was present and is going to order a chest x-ray to evaluate this.  He was briefly placed on oxygen immediately after the fall but does not seem to have an oxygen requirement at this time and was on room air prior to falling.  He does have a history of COPD.    Objective:  /73 (BP Location: Right arm)   Pulse 84   Temp 97.9  F (36.6  C) (Axillary)   Resp 20   Wt 74.4 kg (164 lb)   SpO2 94%   BMI 23.53 kg/m    Gen:  Awake, Alert, NAD  /73 (BP Location: Right arm)   Pulse 84   Temp 97.9  F (36.6  C) (Axillary)   Resp 20   Wt 74.4 kg (164 lb)   SpO2 94%   BMI 23.53 kg/m    Resp -nonlabored breathing on nasal cannula at 1 L  Cardiac - Regular rate & rhythm  Abdomen - soft, minimally tender in left lower quadrant, obese, nondistended.  Extremities - no lower extremity edema.    Assessment:  Harrison is a 77-year-old male with a history of COPD who sustained a fall 2 weeks ago and was seen in the emergency department yesterday for chest pain.  He was found to have a single left-sided rib fracture and a small likely grade 1 splenic hematoma.  His hemoglobin over the last 24 hours has remained stable.  He has been intermittently requiring oxygen though this is likely due to his COPD exacerbation as he does not have pain limiting his respiratory status at this time.  -Medication for surgical intervention  -Continue treatment for rib fractures with adequate pain control  -Hemoglobin stable, okay to de-escalate serial hemoglobin checks  -Surgery will sign off at this time.  Please contact the surgeon on-call for any additional questions or concerns.    Patient discussed with  Dr. Blue.    Jerry Carroll MD, MS 04/23/23 11:07 AM   General Surgery Resident - [id number]

## 2023-04-23 NOTE — CODE/RAPID RESPONSE
[facility]    Fall Note  4/23/2023   Time Called: 1013    Date of Admission:  4/21/2023  Code Status: DNR / DNI      Summary:  I was called to evaluate Harrison Thomas, a 77 year old male following an unwitnessed fall.     The patient is admitted for evaluation of worsening left-sided rib pain following a fall almost 2 weeks ago. PMH includes left-sided cancerous lung nodule which she is undergoing chemotherapy.  CT confirmed a left sixth rib fracture and small splenic hematoma on admission.     Patient reports he was sitting on the edge of the bed when he dropped his phone to the floor.  He reports he leaned over to pick it up and was unsteady on his feet causing him to fall backwards.  He landed on the floor cross legged, hitting his back against a nearby chair.  He denies striking his head.  Denies feelings of lightheadedness or chest pain, or shortness of breath preceding the fall.  He was uncomfortable sitting on the floor and asked nursing staff to assist him back to bed.     Assessment:  Mechanical fall  On my arrival patient is sitting upright in hospital bed, in no acute distress.  Hemodynamically stable.  Nonfocal neuro exam.  Range of motion intact in all 4 extremities.  Cervical thoracic and lumbar spine without step-offs.  Patient with urticarial rash.  X2 new areas of skin abrasions following the fall of which the left shoulder skin tear wound is slightly bleeding.  Immediately following the fall reported lateral left foot discomfort however resolved once in bed and relieved pressure from the floor.    Temp: 97.9  F (36.6  C) Temp src: Axillary BP: 128/73 Pulse: 84   Resp: 20 SpO2: 94 % O2 Device: Nasal cannula Oxygen Delivery: 1 LPM     Plan:  -pCXR   -Encouraged the patient to sit up in recliner chair instead of sitting on the edge of the bed  -Fall precautions  -Recommended wound cleanser and dressing to left upper shoulder skin tear  -Noted physical therapy has been  following this admission and as of yesterday recommending further inpatient physical therapy      Physical Exam  Constitutional:       General: He is not in acute distress.     Appearance: He is not diaphoretic.   HENT:      Head: Normocephalic and atraumatic.      Mouth/Throat:      Mouth: Mucous membranes are dry.   Eyes:      Extraocular Movements: Extraocular movements intact.      Pupils: Pupils are equal, round, and reactive to light.   Cardiovascular:      Rate and Rhythm: Normal rate and regular rhythm.      Pulses: Normal pulses.      Heart sounds: No murmur heard.  Pulmonary:      Effort: Tachypnea present.      Breath sounds: Decreased breath sounds present.   Abdominal:      General: Abdomen is protuberant.      Tenderness: There is abdominal tenderness in the left upper quadrant and left lower quadrant.   Musculoskeletal:      Cervical back: Normal range of motion. No rigidity or tenderness.      Right lower leg: No edema.   Skin:     General: Skin is warm and dry.      Findings: Rash present. Rash is urticarial.          Neurological:      Mental Status: He is alert.      Cranial Nerves: Cranial nerves 2-12 are intact.      Sensory: Sensation is intact. No sensory deficit.   Psychiatric:         Attention and Perception: Attention normal.         Mood and Affect: Mood normal.         Speech: Speech normal.         Behavior: Behavior is cooperative.          Not all injuries from a fall are obvious on initial exam, please to not hesitate to call for reassessment by House provider should the patient's clinical status change, report new pain, or patient/nursing concern.      Discussed and will defer further cares to primary hospitalist Dr. Philippe.     Nadine Zelaya NP  [facility]  Securely message with the [employer] (learn more here)  Text page via [employer] Paging/Directory      I spent 30 minutes at the bedside/on the unit coordinating the plan of care and services  outlined in this note.

## 2023-04-23 NOTE — PLAN OF CARE
VSS, A/O, ambulates assist 1 w/cane and GB.  Pt has been refusing GB and also bed alarm this stay.  Pt fell in room while attempting to  his cell phone from the floor at 1010 and was evaluated by house NP (see note).  Pt had a skin tear on upper left shoulder after fall which was cleansed and dressed.  Pt was notified that Bed and chair alarms would be in use for the remainder of his stay, along with the use of the gait belt. PT came and re-evaluated patient and outpatient balance PT recommended.  Pruritic rash to back, abdomen, thighs and groin, hydrocortisone cream ordered by MD.  Crackles in bases, encouraged to continue using IS.  Plan for discharge on Monday.

## 2023-04-23 NOTE — PLAN OF CARE
Shift Note:   AXOX4, VSS, maintaining 94% saturation with 2L O2 via NC. R PIV SL. Constant pain at L side of chest and abdomen, managed with PRN Hydromorphone. Scattered bruises on both hands and left side of abdomen. On regular diet. Trace edema on BLE. SBA with cane for transfers.  Rashes on lower abdomen and R upper thigh. Has intermittent cough,SOB on exertion .Discharge plan pending.

## 2023-04-24 ENCOUNTER — TRANSFERRED RECORDS (OUTPATIENT)
Dept: HEALTH INFORMATION MANAGEMENT | Facility: CLINIC | Age: 78
End: 2023-04-24
Payer: MEDICARE

## 2023-04-24 VITALS
RESPIRATION RATE: 18 BRPM | DIASTOLIC BLOOD PRESSURE: 62 MMHG | SYSTOLIC BLOOD PRESSURE: 136 MMHG | HEART RATE: 69 BPM | OXYGEN SATURATION: 86 % | WEIGHT: 164 LBS | BODY MASS INDEX: 23.53 KG/M2 | TEMPERATURE: 97.9 F

## 2023-04-24 LAB
ERYTHROCYTE [DISTWIDTH] IN BLOOD BY AUTOMATED COUNT: 14.6 % (ref 10–15)
HCT VFR BLD AUTO: 44 % (ref 40–53)
HGB BLD-MCNC: 14.4 G/DL (ref 13.3–17.7)
MCH RBC QN AUTO: 29.1 PG (ref 26.5–33)
MCHC RBC AUTO-ENTMCNC: 32.7 G/DL (ref 31.5–36.5)
MCV RBC AUTO: 89 FL (ref 78–100)
PLATELET # BLD AUTO: 165 10E3/UL (ref 150–450)
RBC # BLD AUTO: 4.94 10E6/UL (ref 4.4–5.9)
WBC # BLD AUTO: 17.3 10E3/UL (ref 4–11)

## 2023-04-24 PROCEDURE — 250N000013 HC RX MED GY IP 250 OP 250 PS 637: Performed by: INTERNAL MEDICINE

## 2023-04-24 PROCEDURE — 250N000012 HC RX MED GY IP 250 OP 636 PS 637: Performed by: INTERNAL MEDICINE

## 2023-04-24 PROCEDURE — 99239 HOSP IP/OBS DSCHRG MGMT >30: CPT | Performed by: HOSPITALIST

## 2023-04-24 PROCEDURE — 250N000013 HC RX MED GY IP 250 OP 250 PS 637: Performed by: SURGERY

## 2023-04-24 PROCEDURE — 85027 COMPLETE CBC AUTOMATED: CPT | Performed by: HOSPITALIST

## 2023-04-24 PROCEDURE — 36415 COLL VENOUS BLD VENIPUNCTURE: CPT | Performed by: HOSPITALIST

## 2023-04-24 RX ADMIN — CYCLOBENZAPRINE 10 MG: 10 TABLET, FILM COATED ORAL at 08:23

## 2023-04-24 RX ADMIN — FUROSEMIDE 20 MG: 20 TABLET ORAL at 08:24

## 2023-04-24 RX ADMIN — ACETAMINOPHEN 975 MG: 325 TABLET ORAL at 10:23

## 2023-04-24 RX ADMIN — HYDROMORPHONE HYDROCHLORIDE 2 MG: 2 TABLET ORAL at 08:33

## 2023-04-24 RX ADMIN — METOPROLOL SUCCINATE 50 MG: 50 TABLET, EXTENDED RELEASE ORAL at 08:24

## 2023-04-24 RX ADMIN — HYDROCORTISONE: 25 CREAM TOPICAL at 08:25

## 2023-04-24 RX ADMIN — ROSUVASTATIN CALCIUM 10 MG: 10 TABLET, FILM COATED ORAL at 08:23

## 2023-04-24 RX ADMIN — SENNOSIDES AND DOCUSATE SODIUM 1 TABLET: 50; 8.6 TABLET ORAL at 08:24

## 2023-04-24 RX ADMIN — LEVOTHYROXINE SODIUM 137 MCG: 137 TABLET ORAL at 06:35

## 2023-04-24 RX ADMIN — HYDROMORPHONE HYDROCHLORIDE 2 MG: 2 TABLET ORAL at 04:32

## 2023-04-24 RX ADMIN — PREDNISONE 2 MG: 1 TABLET ORAL at 08:24

## 2023-04-24 RX ADMIN — HYDROMORPHONE HYDROCHLORIDE 2 MG: 2 TABLET ORAL at 00:30

## 2023-04-24 RX ADMIN — POTASSIUM CHLORIDE 20 MEQ: 1500 TABLET, EXTENDED RELEASE ORAL at 08:25

## 2023-04-24 RX ADMIN — Medication 25 MCG: at 08:24

## 2023-04-24 RX ADMIN — ACETAMINOPHEN 975 MG: 325 TABLET ORAL at 03:24

## 2023-04-24 RX ADMIN — HYDROMORPHONE HYDROCHLORIDE 2 MG: 2 TABLET ORAL at 11:21

## 2023-04-24 ASSESSMENT — ACTIVITIES OF DAILY LIVING (ADL)
ADLS_ACUITY_SCORE: 23

## 2023-04-24 NOTE — PLAN OF CARE
Shift Note:   AXOX4, VSS, maintaining 94% saturation with 2L O2 via NC. R PIV SL. Constant pain at L side of chest and abdomen, managed with PRN Hydromorphone. Scattered bruises on both hands and left side of abdomen. On regular diet. Trace edema on BLE. SBA with cane for transfers.  Rashes on lower abdomen and R upper thigh, Hydrocortisone applied. Has intermittent cough with yellow sputum, SOB on exertion . Dressing at L upper back CDI. For discharge tomorrow.

## 2023-04-24 NOTE — PLAN OF CARE
Physical Therapy Discharge Summary    Reason for therapy discharge:    Discharged to home with outpatient therapy.    Progress towards therapy goal(s). See goals on Care Plan in [facility] electronic health record for goal details.  Goals partially met.  Barriers to achieving goals:   discharge from facility.    Therapy recommendation(s):    Continued therapy is recommended.  Rationale/Recommendations:  Pt is below baseline, currently SBA/CGA for mobility w/ SEC or walking stick. Pt limited by pain and SOB. Pt has good support from his wife. Anticipate w/ further IP PT, pt will meet goals. Recommend pt use AD for all mobility. Also recommend follow up with outpatient PT to continue to progress safety and independence with mobility with pt having 2 recent falls in the last month. Pt agreeable with this.

## 2023-04-24 NOTE — DISCHARGE SUMMARY
Discharge Note    Patient discharged to home via private vehicle  accompanied by significant other .  IV: Discontinued  Prescriptions printed and given to patient/family.   Belongings reviewed and sent with patient.   Home medications returned to patient: Yes  Equipment sent with: patient.   patient verbalizes understanding of discharge instructions. AVS given to patient.      Pt A/Ox4, VSS on 2L NC. Had some pain relieved with Dilaudid. He is SBA with GB/Walker/personal cane. Continent of B/B. On regular diet. Pt discharged home with wife.

## 2023-04-24 NOTE — DISCHARGE SUMMARY
[facility]  Hospitalist Discharge Summary      Date of Admission:  4/21/2023  Date of Discharge:  4/24/2023  Discharging Provider: Bridget Philippe DO  Discharge Service: Hospitalist Service    Discharge Diagnoses   Left sided chest-abd pain from fall 2 weeks ago  Left shoulder abrasion  Small splenic hematoma  COPD with nocturnal oxygen of 1 L  Leukocytosis  HTN  Hypothyroid  HLP  Pruritic Rash to abdomen, groin and thighs    Follow-ups Needed After Discharge   Follow-up Appointments     Follow-up and recommended labs and tests       Follow up with primary care provider, Yaneli Dozier, within 7 days for   hospital follow- up.  CBC within about 1-2 weeks           Discharge Disposition   Discharged to home with outpatient PT  Condition at discharge: Stable    Hospital Course   Harrison Thomas is a 77 year old male admitted on 4/21/2023. He had a fall almost 2 weeks ago and was not followed up for this injury.  His pain was tolerable initially but now has been having more left sided pain with associate splinting.  He is followed by Dr Genao for left sided cancerous lung nodule for which he was undergoing radiation therapy.  His pulmonologist Dr Handy recommended amoxicillin and prednisone course which he completed.  He presents with worsening pain.     Left sided chest-abd pain from fall 2 weeks ago  Left shoulder abrasion  * CT confirmed left 6th rib fx and small splenic hematoma. Fall on 4/23 when trying to  phone from floor, abrasion to left shoulder-CXR stable  - oral dilaudid 2 mg every 3 hours  - lidocaine patch x2 during the daytime  - flexeril TID  - incentive spirometry encouraged  - outpatient PT on discharge  - follow up wit hPCP within one week as able     Small splenic hematoma  - conservatively managed. Hgb stable near 15  - appreciate gen surg, recommends lidocaine patch, muscle relaxant and ice for pain.     COPD with nocturnal oxygen of 1 L  - continue home O2.  Encourage IS  - continue trelegy, nebs, low dose prednisone    Leukocytosis  Noted on 4/24. Could be reactive after fall on 4/23. No new symptoms, or fevers  - Recommend CBC in 1-2 weeks with PCP     HTN  - continue norvasc, metoprolol, lasix     Hypothyroid  - continue synthroid     HLP  - continue crestor     Pruritic Rash to abdomen, groin and thighs  Present x3 weeks at least. Unsure where it started. Seemed to get worse after he had his fall.   - BID hydrocortisone, prescription sent to [facility] pharmacy in [place]    Consultations This Hospital Stay   PHYSICAL THERAPY ADULT IP CONSULT  SURGERY GENERAL IP CONSULT  CARE MANAGEMENT / SOCIAL WORK IP CONSULT    Code Status   No CPR- Do NOT Intubate    Time Spent on this Encounter   I, Bridget Philippe DO, personally saw the patient today and spent greater than 30 minutes discharging this patient.       Bridget Philippe DO  [place] [address] [facility]  [address]  [facility] [address]  Phone: [phone]  ______________________________________________________________________    Physical Exam   Vital Signs: Temp: 97.9  F (36.6  C) Temp src: Oral BP: 136/62 Pulse: 69   Resp: 18 SpO2: (!) 86 % O2 Device: Nasal cannula Oxygen Delivery: 1/2 LPM  Weight: 164 lbs 0 oz    Patient seen and examined. He is doing okay. Pain is stable, no worse after fall yesterday. Worked with therapy and recommended for outpatient PT. Requires oxygen at night, stable on room air 88-89% during day. Doing well with incentive spirometry. Has appt with Dr Dumont today for follow-up so discharge has been planned prior to appt. Stable for discharge to home.    Constitutional:Awake, alert, cooperative, no apparent distress  Respiratory: Clear to auscultation bilaterally, crackles at bases.  Cardiovascular: Regular rate and rhythm, normal S1 and S2, and no murmur noted  GI: Normal bowel sounds, soft, non-distended, non-tender  Skin/Integumen: + rash to back, groin, thigh,  lower abdomen, pruritic, blanchable, lacy.  Other:  left shoulder abrasion with dressing covering, low back excoriations.       Primary Care Physician   Yaneli Dozier    Discharge Orders      **CBC with platelets FUTURE 14d     Physical Therapy Referral      Primary Care - Care Coordination Referral      Reason for your hospital stay    Pain and low oxygen due to fall/rib fracture     Activity    Your activity upon discharge: activity as tolerated     Discharge Instructions    1. Take dilaudid 2mg as needed for pain.  Do not take oxycodone while you are taking dilaudid.    2. Take flexeril 10mg three times a day for muscle relaxant    3. Use lidocaine patch daily for 12hours at a time. Apply to your side where your rib pain is located.    4. Take senna-docusate regularly while you are taking dilaudid to help you have a bowel movement.    5. Take tylenol three times a day to help with inflammatory pain, for at least 5 days then can switch to as needed thereafter    6. Use steroid cream (hydrocortisone) twice daily to help with itchy rash. You can also apply benadryl cream to areas to help with itching.     Follow-up and recommended labs and tests     Follow up with primary care provider, Yaneli Dozier, within 7 days for hospital follow- up.  CBC within about 1-2 weeks     Oxygen Adult/Peds    Oxygen Documentation  I certify that this patient, Harrison Thomas has been under my care (or a nurse practitioner or physican's assistant working with me). This is the face-to-face encounter for oxygen medical necessity.      At the time of this encounter supplemental oxygen is reasonable and necessary and is expected to improve the patient's condition in a home setting.       Patient has continued oxygen desaturation due to COPD J44.9.    If portability is ordered, is the patient mobile within the home? yes     Diet    Follow this diet upon discharge:  Regular Diet Adult       Significant Results and Procedures   Most Recent  3 CBC's:  Recent Labs   Lab Test 04/24/23  0732 04/22/23  0703 04/21/23  1105   WBC 17.3* 9.0 10.1   HGB 14.4 15.0 15.1   MCV 89 89 90    162 167     Most Recent 3 BMP's:  Recent Labs   Lab Test 04/23/23  1018 04/22/23  0703 04/21/23  1043 02/09/23  1225   NA  --  138 140 138   POTASSIUM  --  4.5 4.4 4.4   CHLORIDE  --  100 99 102   CO2  --  30* 32* 25   BUN  --  17.6 17.7 18.6   CR  --  1.01 1.04 0.80   ANIONGAP  --  8 9 11   KARLIE  --  9.0 9.7 9.1   * 84 89 76   ,   Results for orders placed or performed during the hospital encounter of 04/21/23   CT Chest/Abdomen/Pelvis w Contrast    Narrative    CT CHEST/ABDOMEN/PELVIS WITH CONTRAST 4/21/2023 12:06 PM    CLINICAL HISTORY: Fall two weeks ago. Left lower rib tenderness. Left  upper quadrant abdominal pain.    TECHNIQUE: CT scan of the chest, abdomen, and pelvis was performed  following injection of IV contrast. Multiplanar reformats were  obtained. Dose reduction techniques were used.   CONTRAST: 82 mL Isovue-370  COMPARISON: Chest CT performed 1/15/2023.    FINDINGS:   LUNGS AND PLEURA: Moderate emphysematous changes in both upper lungs.  Masslike consolidation in the right upper lobe is not significantly  changed. Patchy nodular opacities about the periphery of both lower  lungs has overall increased slightly. Scattered small pulmonary  nodules in both lungs measure 0.6 cm or smaller, and are not  significantly changed. No pneumothorax. No pleural effusions.    MEDIASTINUM/AXILLAE: Thyroidectomy. A mildly enlarged right  paratracheal lymph node (series 3 image 51) has decreased slightly in  size, measuring 1.2 cm. No other enlarged lymph nodes are identified  in the chest. No pericardial effusion.    CORONARY ARTERY CALCIFICATION: Previous intervention (stents or CABG).    HEPATOBILIARY: Postoperative changes of right hepatectomy. No hepatic  masses are seen.    PANCREAS: Normal.    SPLEEN: Small low density subcapsular collection along the  lateral  aspect of the spleen measures 1.2 cm in thickness (series 3 image  114).    ADRENAL GLANDS: Normal.    KIDNEYS/BLADDER: A few bilateral renal cysts would require no specific  follow-up. The kidneys are otherwise unremarkable. No hydronephrosis.    BOWEL: Moderate amount of stool in the rectum and throughout the  colon. No bowel obstruction. Sigmoid diverticulosis. No convincing  evidence for colitis or diverticulitis. Unremarkable appendix.    PELVIC ORGANS: Unremarkable.    LYMPH NODES: No enlarged lymph nodes are identified in the abdomen or  pelvis.    VASCULATURE: Severe atherosclerotic aortoiliac calcification.    ADDITIONAL FINDINGS: None.    MUSCULOSKELETAL: A nondisplaced fracture of the left sixth rib  laterally may be acute. Nondisplaced fractures of the left seventh and  eighth ribs anterolaterally are likely subacute or chronic.  Intramuscular lipoma in the abdominal wall on the right laterally  measures 6.9 cm. Left hip arthroplasty. Degenerative changes  throughout the thoracolumbar spine.      Impression    IMPRESSION:   1.  Acute appearing nondisplaced fracture of the left sixth rib  laterally.  2.  A small low density collection along the lateral aspect of the  spleen likely represents a resolving or subcapsular hematoma.  3.  Moderate amount of stool throughout the colon and within the  rectum.  4.  Masslike consolidation in the right upper lobe is unchanged.  Scattered smaller pulmonary nodules in both lungs are also unchanged.  5.  Mild patchy consolidation about the periphery of both lung bases  has overall increased slightly.  6.  Emphysema.    LAVERNE RENO MD         SYSTEM ID:  [id number]   XR Chest Port 1 View    Narrative    EXAM: XR CHEST PORTABLE 1 VIEW  LOCATION: [facility]  DATE/TIME: 04/23/2023, 11:11 AM CDT    INDICATION: Status post fall with known sixth rib fracture.  COMPARISON: 01/15/2023.      Impression    IMPRESSION: Infiltrate versus  contusion in the right upper lobe is similar to previous. No pneumothorax. Mildly elevated right hemidiaphragm.       *Note: Due to a large number of results and/or encounters for the requested time period, some results have not been displayed. A complete set of results can be found in Results Review.       Discharge Medications   Current Discharge Medication List      START taking these medications    Details   acetaminophen (TYLENOL) 325 MG tablet Take 3 tablets (975 mg) by mouth every 8 hours    Associated Diagnoses: Closed fracture of one rib, unspecified laterality, initial encounter      cyclobenzaprine (FLEXERIL) 10 MG tablet Take 1 tablet (10 mg) by mouth 3 times daily  Qty: 30 tablet, Refills: 0    Comments: Future refills by PCP Dr. Yaneli Dozier with phone number [phone].  Associated Diagnoses: Closed fracture of one rib, unspecified laterality, initial encounter      hydrocortisone 2.5 % cream Apply topically 2 times daily  Qty: 30 g, Refills: 0    Comments: Future refills by PCP Dr. Yaneli Dozier with phone number [phone].  Associated Diagnoses: Pruritic rash      HYDROmorphone (DILAUDID) 2 MG tablet Take 1 tablet (2 mg) by mouth every 3 hours as needed for moderate pain  Qty: 35 tablet, Refills: 0    Comments: Future refills by PCP Dr. Yaneli Dozier with phone number [phone].  Associated Diagnoses: Closed fracture of one rib, unspecified laterality, initial encounter      Lidocaine (LIDOCARE) 4 % Patch Place 2 patches onto the skin every 24 hours To prevent lidocaine toxicity, patient should be patch free for 12 hrs daily.  Qty: 20 patch, Refills: 0    Comments: Future refills by PCP Dr. Yaneli Dozier with phone number [phone].  Associated Diagnoses: Closed fracture of one rib, unspecified laterality, initial encounter         CONTINUE these medications which have CHANGED    Details   DULoxetine (CYMBALTA) 20 MG capsule Take 1 capsule (20 mg) by mouth At Bedtime    Associated Diagnoses:  PAVAN (generalized anxiety disorder); Situational depression      oxyCODONE (ROXICODONE) 5 MG tablet Take 1 tablet (5 mg) by mouth 3 times daily as needed for pain  Qty: 90 tablet, Refills: 0    Associated Diagnoses: Metastasis from thyroid cancer (H)      potassium chloride ER (KLOR-CON M) 20 MEQ CR tablet Take 1 tablet (20 mEq) by mouth daily (with lunch) Prescribed as twice daily but pt is only taking it daily at lunch.    Associated Diagnoses: Hypokalemia      predniSONE (DELTASONE) 1 MG tablet Take 2 tablets (2 mg) by mouth daily For COPD    Associated Diagnoses: COPD, very severe (H)         CONTINUE these medications which have NOT CHANGED    Details   albuterol (ACCUNEB) 1.25 MG/3ML neb solution Take 1 vial (1.25 mg) by nebulization every 4 hours as needed for shortness of breath or wheezing  Qty: 90 mL, Refills: 4    Associated Diagnoses: COPD exacerbation (H)      albuterol (PROAIR HFA/PROVENTIL HFA/VENTOLIN HFA) 108 (90 Base) MCG/ACT inhaler INHALE 2 PUFFS BY MOUTH EVERY 6 HOURS AS NEEDED FOR WHEEZE OR FOR SHORTNESS OF BREATH  Qty: 18 g, Refills: 3    Associated Diagnoses: Chronic obstructive pulmonary disease with (acute) exacerbation (H)      amLODIPine (NORVASC) 5 MG tablet Take 5 mg by mouth At Bedtime    Associated Diagnoses: Benign essential hypertension      ascorbic acid 1000 MG TABS tablet Take 1,000 mg by mouth daily      aspirin 81 MG tablet Take 1 tablet by mouth 2 times daily      azithromycin (ZITHROMAX) 500 MG tablet Take 1 tablet (500 mg) by mouth every other day  Qty: 31 tablet, Refills: 3    Associated Diagnoses: COPD, very severe (H)      calcium carbonate (OS-KARLIE) 1500 (600 Ca) MG tablet Take 600 mg by mouth 2 times daily (with meals)      cholecalciferol 25 MCG (1000 UT) TABS Take 1,000 Units by mouth daily       Fluticasone-Umeclidin-Vilanterol (TRELEGY ELLIPTA) 100-62.5-25 MCG/INH oral inhaler Inhale 1 puff into the lungs daily  Qty: 90 each, Refills: 3    Associated Diagnoses:  Obstructive chronic bronchitis with exacerbation (H)      furosemide (LASIX) 20 MG tablet Take 1 tablet (20 mg) by mouth 2 times daily In morning and at noon    Associated Diagnoses: Essential hypertension, benign      guaiFENesin (MUCINEX) 600 MG 12 hr tablet Take 600 mg by mouth 2 times daily as needed for congestion      hydrOXYzine (ATARAX) 10 MG tablet Take 1 tablet (10 mg) by mouth 3 times daily as needed for itching  Qty: 90 tablet, Refills: 3    Associated Diagnoses: COPD exacerbation (H); PAVAN (generalized anxiety disorder)      levothyroxine (SYNTHROID/LEVOTHROID) 137 MCG tablet Take 1 tablet (137 mcg) by mouth daily , starting on 21  Qty: 90 tablet, Refills: 3    Associated Diagnoses: Postsurgical hypothyroidism      MegaRed Omega-3 Krill Oil 500 MG CAPS Take 500 mg by mouth daily lunch    Comments: Patient reported. OTC.      Melatonin 10 MG TABS tablet Take 10 mg by mouth At Bedtime    Comments: Patient reported. OTC.      metoprolol succinate ER (TOPROL XL) 50 MG 24 hr tablet Take 50 mg by mouth daily  Qty: 93 tablet, Refills: 3    Associated Diagnoses: Essential (primary) hypertension      multivitamin w/minerals (THERA-VIT-M) tablet Take 1 tablet by mouth daily       naloxone (NARCAN) 4 MG/0.1ML nasal spray Spray 1 spray (4 mg) into one nostril alternating nostrils once as needed for opioid reversal every 2-3 minutes until assistance arrives  Qty: 0.2 mL, Refills: 3    Associated Diagnoses: Spinal stenosis of lumbar region with neurogenic claudication; Metastasis from thyroid cancer (H); Rheumatoid arthritis involving multiple sites with positive rheumatoid factor (H)      nitroGLYcerin (NITROSTAT) 0.4 MG sublingual tablet FOR CHEST PAIN PLACE 1 TAB UNDER TONGUE EVERY 5 MIN FOR 3 DOSES. IF SYMPTOMS PERSIST 5 MIN AFTER 1ST DOSE CALL 911  Qty: 25 tablet, Refills: 3    Comments: DX Code Needed  PLEASE SEND PT STATES ONES HE HAS ARE CLOSE TO .  Associated Diagnoses: Coronary artery disease  involving native coronary artery of native heart without angina pectoris      rosuvastatin (CRESTOR) 10 MG tablet TAKE 1 TABLET BY MOUTH EVERY DAY  Qty: 90 tablet, Refills: 2    Associated Diagnoses: Hyperlipidemia, unspecified      senna-docusate (SENOKOT-S/PERICOLACE) 8.6-50 MG tablet Take 1 tablet by mouth 2 times daily as needed for constipation           Allergies   Allergies   Allergen Reactions     Levaquin Difficulty breathing     Plavix [Clopidogrel Bisulfate] Itching     Atorvastatin Calcium Cramps     lipitor     Cats      Dogs      Hctz [Hydrochlorothiazide]      Rash on legs     Sulfasalazine Other (See Comments)     Stomach cramps

## 2023-04-24 NOTE — CONSULTS
Care Management Initial Consult    General Information  Assessment completed with: Patient,    Type of CM/SW Visit: Initial Assessment    Primary Care Provider verified and updated as needed: Yes   Readmission within the last 30 days:        Reason for Consult: discharge planning  Advance Care Planning:            Communication Assessment  Patient's communication style: spoken language (English or Bilingual)    Hearing Difficulty or Deaf: no   Wear Glasses or Blind: yes    Cognitive  Cognitive/Neuro/Behavioral: WDL                      Living Environment:   People in home: spouse     Current living Arrangements: house      Able to return to prior arrangements: yes       Family/Social Support:  Care provided by: spouse/significant other  Provides care for:    Marital Status:   Wife          Description of Support System: Supportive, Involved         Current Resources:   Patient receiving home care services:       Community Resources: Other (see comment) (Home O2/[facility])  Equipment currently used at home:  (walking stick)  Supplies currently used at home:      Employment/Financial:  Employment Status: retired        Financial Concerns:             Does the patient's insurance plan have a 3 day qualifying hospital stay waiver?  No    Lifestyle & Psychosocial Needs:  Social Determinants of Health     Tobacco Use: Medium Risk (3/24/2023)    Patient History      Smoking Tobacco Use: Former      Smokeless Tobacco Use: Never      Passive Exposure: Not on file   Alcohol Use: Not on file   Financial Resource Strain: Not on file   Food Insecurity: Not on file   Transportation Needs: Not on file   Physical Activity: Not on file   Stress: Not on file   Social Connections: Not on file   Intimate Partner Violence: Not on file   Depression: At risk (1/23/2023)    PHQ-2      PHQ-2 Score: 6   Housing Stability: Not on file       Functional Status:  Prior to admission patient needed assistance:              Mental Health  Status:  Mental Health Status: No Current Concerns       Chemical Dependency Status:                Values/Beliefs:  Spiritual, Cultural Beliefs, Zoroastrianism Practices, Values that affect care:                 Additional Information:  Writer met with patient at bedside and introduced self.  Writer confirmed with patient that he can do a virtual appt for his follow up with PCP, Dr. Dozier.  Patient stated he has ocygen at home.  He states he does not need oxygen for the care ride home and usually only uses his oxygen at night time.  Patient stated he gets his oxygen and equipment through [facility].    Linn Pitt RN

## 2023-04-24 NOTE — PLAN OF CARE
Goal Outcome Evaluation: [phone]    A&O x4. SBA w/cane, bed alarm on (pt fell on previous shift). VSS on 2L NC (88% on 1L NC), encouraging use of IS. Constant LUQ abdominal pain controlled with PRN Dilaudid and scheduled Tylenol, offered ice, pt declined. Patch free period. R PIV SL. Last BM 4/23. Voiding spontaneously.

## 2023-04-24 NOTE — PROGRESS NOTES
Care Management Discharge Note    Discharge Date: 04/24/2023       Discharge Disposition: Home    Discharge Services:      Discharge DME:      Discharge Transportation: car, drives self, family or friend will provide    Private pay costs discussed: Not applicable    PAS Confirmation Code:    Patient/family educated on Medicare website which has current facility and service quality ratings:      Education Provided on the Discharge Plan:    Persons Notified of Discharge Plans: patient  Patient/Family in Agreement with the Plan: yes    Handoff Referral Completed: Yes    Additional Information:  Patient to discharge home today.  Wife will be picking him up when he is ready.        Linn Pitt RN

## 2023-04-25 ENCOUNTER — TELEPHONE (OUTPATIENT)
Dept: CARE COORDINATION | Facility: CLINIC | Age: 78
End: 2023-04-25
Payer: MEDICARE

## 2023-04-25 NOTE — TELEPHONE ENCOUNTER
"Pt on Rib Fracture MyChart Care Companion with red flag alerts for generalized SOB with worsening intensity and frequency over the past 7 days, rating SOB 7-8/10, and pain of 7/10.      Of note, patient was recently hospitalized at [facility] from 4/21/23 - 4/24/23 for:   Left sided chest-abd pain from fall 2 weeks ago  Left shoulder abrasion  Small splenic hematoma  COPD with nocturnal oxygen of 1 L  Leukocytosis  HTN  Hypothyroid  HLP  Pruritic Rash to abdomen, groin and thighs       Spoke with patient:     Patient reports \"if I do stuff I get short of breath, walking 30-40 steps and back.\"  States he feels \"winded now,\" but prior to answering the phone he was up moving around.  \"I don't feel like I'm having a hard time breathing, I'm good.\"    Has O2 at home he wears primarily at night, but does use it during the day if needed.  O2 sats 93% sitting in chair now (10:28am).  Sats drop down into the 80's with activity, does not use O2 with activity because the tubing gets tangles.       RN can hear audible expiratory wheezing/tightness.  Patient speaking in full sentences.    Has COPD - on Trelegy (taken this morning), albuterol inhaler and nebs, and low dose prednisone 2 mg daily (taken today).  He has not taken albuterol.    States he feels about the same as he did yesterday before discharging home from the hospital.  He did not bring the IS home from the hospital as he has one at home, which he has been using.  IS ranging between 9900-1397.    Using lidocaine patches, hydromorphone 1/2 tab 2-3 times a day and 1 full tab at night to help sleep, Tylenol TID.      RN reviewed precautions and when to call clinic.  Continued monitoring O2 sats, continue O2 as directed, breathing exercises using IS 10 times every hour, pain meds as prescribed.  Ambulate several times a day.  Follow up with provider as scheduled.  Patient verbalized understanding and agrees with plan.       Jessa Yun, " RN  [facility]  [facility] - Ambulatory Care Management

## 2023-04-26 DIAGNOSIS — J44.9 COPD (CHRONIC OBSTRUCTIVE PULMONARY DISEASE) (H): Primary | ICD-10-CM

## 2023-04-26 RX ORDER — PREDNISONE 20 MG/1
TABLET ORAL
Qty: 17 TABLET | Refills: 0 | Status: SHIPPED | OUTPATIENT
Start: 2023-04-26 | End: 2023-06-12

## 2023-04-27 DIAGNOSIS — J44.9 COPD (CHRONIC OBSTRUCTIVE PULMONARY DISEASE) (H): Primary | ICD-10-CM

## 2023-04-27 NOTE — Clinical Note
Please call patient to schedule 6MWT the same day as his appointment with Dr. Hnady on 9/29. 6MWT should be before appointment. Thanks

## 2023-04-27 NOTE — PROGRESS NOTES
Per Dr. Handy:  I am fine with another 6 minute walk with finger probe.    Patient called and notified.   Sent message to clinic coordinators to schedule.

## 2023-05-02 ENCOUNTER — TELEPHONE (OUTPATIENT)
Dept: PULMONOLOGY | Facility: CLINIC | Age: 78
End: 2023-05-02
Payer: MEDICARE

## 2023-05-02 NOTE — TELEPHONE ENCOUNTER
Patient Contacted     Appointment type: 6MWT  Provider: AUSTIN  Return date: 9/29/23  Specialty phone number: [phone]  Additional appointment(s) needed: NA  Additonal Notes: appts confirmed w/ pt.

## 2023-05-02 NOTE — PROGRESS NOTES
Assessment & Plan     Spleen hematoma, subsequent encounter  CT films shared with patient and his wife and we will check hemoglobin again  He had leukocytosis upon discharge  - REVIEW OF HEALTH MAINTENANCE PROTOCOL ORDERS    Closed fracture of one rib of left side with routine healing, subsequent encounter  I discussed the risk of pneumonia and he will do incentive spirometry*  - lidocaine (LIDODERM) 5 % patch  Dispense: 30 patch; Refill: 0  Continue oxycodone also  COPD, very severe (H)  Continue to use home oxygen but she did not have on hand today and watch for pneumonia  - REVIEW OF HEALTH MAINTENANCE PROTOCOL ORDERS  - lidocaine (LIDODERM) 5 % patch  Dispense: 30 patch; Refill: 0    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)  Patient is not on meds at present  - REVIEW OF HEALTH MAINTENANCE PROTOCOL ORDERS    BRENNEN (obstructive sleep apnea)  I think using CPAP will help with the daytime balance also because of less sleepiness    Coronary artery disease involving native coronary artery of native heart without angina pectoris  Overdue for lipid profile but otherwise stable  - Lipid panel reflex to direct LDL Non-fasting  - REVIEW OF HEALTH MAINTENANCE PROTOCOL ORDERS    Metastasis from thyroid cancer (H)  On oxycodone  - oxyCODONE (ROXICODONE) 5 MG tablet  Dispense: 90 tablet; Refill: 0    At high risk for injury related to fall  Physical therapy should be done for fall prevention  - Physical Therapy Referral                 MED REC REQUIRED  Post Medication Reconciliation Status: discharge medications reconciled, continue medications without change      Yaneli Dozier MD  [facility] LAN Terry is a 77 year old, presenting for the following health issues:  Hospital F/U         View : No data to display.              HPI   Patient fell and injured his left side  This was 3 weeks ago    Patient was managing well until April 21 when he was having  more left-sided pain and  He was found to have splenic rupture  He also has left-sided fifth rib fracture  He has amoxicillin and prednisone on hand and is doing well in regards to COPD  Patient tends to fall a lot  He has had major injuries related to that    He is also on oxycodone for pain control  Hospital Follow-up Visit:    Hospital/Nursing Home/IP Rehab Facility: [facility]  Date of Admission:  4/21/2023  Date of Discharge:  4/24/2023  Discharging Provider: Bridget Philippe DO  Discharge Service: Hospitalist Service        Discharge Diagnoses  Left sided chest-abd pain from fall 2 weeks ago  Left shoulder abrasion  Small splenic hematoma  COPD with nocturnal oxygen of 1 L  Leukocytosis  HTN  Hypothyroid  HLP  Pruritic Rash to abdomen, groin and thighs  Hospital Course  Harrison Thomas is a 77 year old male admitted on 4/21/2023. He had a fall almost 2 weeks ago and was not followed up for this injury.  His pain was tolerable initially but now has been having more left sided pain with associate splinting.  He is followed by Dr Genao for left sided cancerous lung nodule for which he was undergoing radiation therapy.  His pulmonologist Dr Handy recommended amoxicillin and prednisone course which he completed.  He presents with worsening pain.     Left sided chest-abd pain from fall 2 weeks ago  Left shoulder abrasion  * CT confirmed left 6th rib fx and small splenic hematoma. Fall on 4/23 when trying to  phone from floor, abrasion to left shoulder-CXR stable  - oral dilaudid 2 mg every 3 hours  - lidocaine patch x2 during the daytime  - flexeril TID  - incentive spirometry encouraged  - outpatient PT on discharge  - [facility] as [facility]     Small splenic hematoma  - conservatively managed. Hgb stable near 15  - appreciate gen surg, recommends lidocaine patch, muscle relaxant and ice for pain.     COPD with nocturnal oxygen of 1 L  - continue home O2.  "Encourage IS  - continue trelegy, nebs, low dose prednisone     Leukocytosis  Noted on 4/24. Could be reactive after fall on 4/23. No new symptoms, or fevers  - Recommend CBC in 1-2 weeks with PCP     HTN  - continue norvasc, metoprolol, lasix     Hypothyroid  - continue synthroid     HLP  - continue crestor     Pruritic Rash to abdomen, groin and thighs  Present x3 weeks at least. Unsure where it started. Seemed to get worse after he had his fall.   - BID hydrocortisone, prescription sent to [facility] pharmacy in [place]          Was your hospitalization related to COVID-19? No   Problems taking medications regularly:  None  Medication changes since discharge: None  Problems adhering to non-medication therapy:  None    Summary of hospitalization:  [facility] hospital discharge summary reviewed  Diagnostic Tests/Treatments reviewed.  Follow up needed: none  Other Healthcare Providers Involved in Patient s Care:         None  Update since discharge: improved.         Plan of care communicated with patient                   Review of Systems   10 point ROS of systems including Constitutional, Eyes, Respiratory, Cardiovascular, Gastroenterology, Genitourinary, Integumentary, Muscularskeletal, Psychiatric were all negative except for pertinent positives noted in my HPI.        Objective    /65 (BP Location: Right arm, Patient Position: Chair, Cuff Size: Adult Large)   Pulse 53   Temp 97.7  F (36.5  C) (Temporal)   Resp 16   Ht 1.778 m (5' 10\")   Wt 77.6 kg (171 lb)   SpO2 90%   BMI 24.54 kg/m    Body mass index is 24.54 kg/m .  Physical Exam   GENERAL: healthy, alert and no distress  NECK: no adenopathy, no asymmetry, masses, or scars and thyroid normal to palpation  RESP: Very tender at the lower left rib cage lungs clear to auscultation - no rales, rhonchi or wheezes  CV: regular rate and rhythm, normal S1 S2, no S3 or S4, no murmur, click or rub, no peripheral edema and peripheral pulses " strong  ABDOMEN: soft, splenic area tender, no hepatosplenomegaly, no masses and bowel sounds normal  MS: no gross musculoskeletal defects noted, no edema    Patient Active Problem List   Diagnosis     Essential hypertension, benign     Pain in joint, upper arm     Allergic rhinitis     Pain in joint, lower leg     Chronic airway obstruction (H)     Monoclonal paraproteinemia     Immunodeficiency (H)     Eczema     COPD (chronic obstructive pulmonary disease) (H)     Transient cerebral ischemia     Bunion     Occipital neuralgia     Hyperlipidemia LDL goal <100     Health Care Home     Low back pain     Olecranon bursitis of right elbow     Bruit     Retina hole     signed & scanned on 09/23/2011  (1-4-2013 printed but not scanned in)      Chronic, continuous use of opioids     Atherosclerosis of native coronary artery of native heart without angina pectoris     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, unspecified thyrotoxicosis type     Right-sided low back pain with right-sided sciatica     Coronary artery disease involving native coronary artery of native heart without angina pectoris     BRENNEN (obstructive sleep apnea)     Rheumatoid arthritis (H)     Hammer toe of right foot     Hallux limitus of right foot     Corn of toe     Non-recurrent unilateral inguinal hernia with obstruction without gangrene     Left inguinal hernia     Metastasis from thyroid cancer (H)     Nodule of upper lobe of right lung     Preoperative examination     Chronic pain disorder     Closed fracture of one rib     Acute on chronic respiratory failure with hypoxia (H)     Closed fracture of one rib of left side, initial encounter     Dehydration     Shock (H)     CALLUM (acute kidney injury) (H)     COPD, very severe (H)     Acute kidney failure with tubular necrosis (H)     Hypokalemia     Hypoxia     Spleen hematoma, initial encounter     Fall, initial encounter     Current Outpatient Medications   Medication     acetaminophen (TYLENOL) 325  MG tablet     albuterol (ACCUNEB) 1.25 MG/3ML neb solution     albuterol (PROAIR HFA/PROVENTIL HFA/VENTOLIN HFA) 108 (90 Base) MCG/ACT inhaler     amLODIPine (NORVASC) 5 MG tablet     ascorbic acid 1000 MG TABS tablet     aspirin 81 MG tablet     azithromycin (ZITHROMAX) 500 MG tablet     calcium carbonate (OS-KARLIE) 1500 (600 Ca) MG tablet     cholecalciferol 25 MCG (1000 UT) TABS     cyclobenzaprine (FLEXERIL) 10 MG tablet     DULoxetine (CYMBALTA) 20 MG capsule     Fluticasone-Umeclidin-Vilanterol (TRELEGY ELLIPTA) 100-62.5-25 MCG/INH oral inhaler     furosemide (LASIX) 20 MG tablet     guaiFENesin (MUCINEX) 600 MG 12 hr tablet     hydrocortisone 2.5 % cream     HYDROmorphone (DILAUDID) 2 MG tablet     hydrOXYzine (ATARAX) 10 MG tablet     levothyroxine (SYNTHROID/LEVOTHROID) 137 MCG tablet     Lidocaine (LIDOCARE) 4 % Patch     MegaRed Omega-3 Krill Oil 500 MG CAPS     Melatonin 10 MG TABS tablet     metoprolol succinate ER (TOPROL XL) 50 MG 24 hr tablet     multivitamin w/minerals (THERA-VIT-M) tablet     naloxone (NARCAN) 4 MG/0.1ML nasal spray     nitroGLYcerin (NITROSTAT) 0.4 MG sublingual tablet     oxyCODONE (ROXICODONE) 5 MG tablet     potassium chloride ER (KLOR-CON M) 20 MEQ CR tablet     predniSONE (DELTASONE) 1 MG tablet     predniSONE (DELTASONE) 20 MG tablet     rosuvastatin (CRESTOR) 10 MG tablet     senna-docusate (SENOKOT-S/PERICOLACE) 8.6-50 MG tablet     No current facility-administered medications for this visit.               Admission on 04/21/2023, Discharged on 04/24/2023   Component Date Value Ref Range Status     Sodium 04/21/2023 140  136 - 145 mmol/L Final     Potassium 04/21/2023 4.4  3.4 - 5.3 mmol/L Final     Chloride 04/21/2023 99  98 - 107 mmol/L Final     Carbon Dioxide (CO2) 04/21/2023 32 (H)  22 - 29 mmol/L Final     Anion Gap 04/21/2023 9  7 - 15 mmol/L Final     Urea Nitrogen 04/21/2023 17.7  8.0 - 23.0 mg/dL Final     Creatinine 04/21/2023 1.04  0.67 - 1.17 mg/dL Final      Calcium 04/21/2023 9.7  8.8 - 10.2 mg/dL Final     Glucose 04/21/2023 89  70 - 99 mg/dL Final     GFR Estimate 04/21/2023 75  >60 mL/min/1.73m2 Final    eGFR calculated using 2021 CKD-EPI equation.     Hold Specimen 04/21/2023 JIC   Final     Hold Specimen 04/21/2023 JIC   Final     WBC Count 04/21/2023 10.1  4.0 - 11.0 10e3/uL Final     RBC Count 04/21/2023 5.25  4.40 - 5.90 10e6/uL Final     Hemoglobin 04/21/2023 15.1  13.3 - 17.7 g/dL Final     Hematocrit 04/21/2023 47.3  40.0 - 53.0 % Final     MCV 04/21/2023 90  78 - 100 fL Final     MCH 04/21/2023 28.8  26.5 - 33.0 pg Final     MCHC 04/21/2023 31.9  31.5 - 36.5 g/dL Final     RDW 04/21/2023 14.6  10.0 - 15.0 % Final     Platelet Count 04/21/2023 167  150 - 450 10e3/uL Final     % Neutrophils 04/21/2023 62  % Final     % Lymphocytes 04/21/2023 11  % Final     % Monocytes 04/21/2023 8  % Final     % Eosinophils 04/21/2023 17  % Final     % Basophils 04/21/2023 1  % Final     % Immature Granulocytes 04/21/2023 1  % Final     NRBCs per 100 WBC 04/21/2023 0  <1 /100 Final     Absolute Neutrophils 04/21/2023 6.3  1.6 - 8.3 10e3/uL Final     Absolute Lymphocytes 04/21/2023 1.1  0.8 - 5.3 10e3/uL Final     Absolute Monocytes 04/21/2023 0.8  0.0 - 1.3 10e3/uL Final     Absolute Eosinophils 04/21/2023 1.7 (H)  0.0 - 0.7 10e3/uL Final     Absolute Basophils 04/21/2023 0.1  0.0 - 0.2 10e3/uL Final     Absolute Immature Granulocytes 04/21/2023 0.1  <=0.4 10e3/uL Final     Absolute NRBCs 04/21/2023 0.0  10e3/uL Final     INR 04/21/2023 0.94  0.85 - 1.15 Final     ABO/RH(D) 04/21/2023 O POS   Final     Antibody Screen 04/21/2023 Negative  Negative Final     SPECIMEN EXPIRATION DATE 04/21/2023 [id number]   Final     SARS CoV2 PCR 04/21/2023 Negative  Negative Final    NEGATIVE: SARS-CoV-2 (COVID-19) RNA not detected, presumed negative.     Sodium 04/22/2023 138  136 - 145 mmol/L Final     Potassium 04/22/2023 4.5  3.4 - 5.3 mmol/L Final     Chloride 04/22/2023 100  98 -  107 mmol/L Final     Carbon Dioxide (CO2) 04/22/2023 30 (H)  22 - 29 mmol/L Final     Anion Gap 04/22/2023 8  7 - 15 mmol/L Final     Urea Nitrogen 04/22/2023 17.6  8.0 - 23.0 mg/dL Final     Creatinine 04/22/2023 1.01  0.67 - 1.17 mg/dL Final     Calcium 04/22/2023 9.0  8.8 - 10.2 mg/dL Final     Glucose 04/22/2023 84  70 - 99 mg/dL Final     GFR Estimate 04/22/2023 78  >60 mL/min/1.73m2 Final    eGFR calculated using 2021 CKD-EPI equation.     WBC Count 04/22/2023 9.0  4.0 - 11.0 10e3/uL Final     RBC Count 04/22/2023 5.21  4.40 - 5.90 10e6/uL Final     Hemoglobin 04/22/2023 15.0  13.3 - 17.7 g/dL Final     Hematocrit 04/22/2023 46.6  40.0 - 53.0 % Final     MCV 04/22/2023 89  78 - 100 fL Final     MCH 04/22/2023 28.8  26.5 - 33.0 pg Final     MCHC 04/22/2023 32.2  31.5 - 36.5 g/dL Final     RDW 04/22/2023 14.7  10.0 - 15.0 % Final     Platelet Count 04/22/2023 162  150 - 450 10e3/uL Final     GLUCOSE BY METER POCT 04/23/2023 151 (H)  70 - 99 mg/dL Final     WBC Count 04/24/2023 17.3 (H)  4.0 - 11.0 10e3/uL Final     RBC Count 04/24/2023 4.94  4.40 - 5.90 10e6/uL Final     Hemoglobin 04/24/2023 14.4  13.3 - 17.7 g/dL Final     Hematocrit 04/24/2023 44.0  40.0 - 53.0 % Final     MCV 04/24/2023 89  78 - 100 fL Final     MCH 04/24/2023 29.1  26.5 - 33.0 pg Final     MCHC 04/24/2023 32.7  31.5 - 36.5 g/dL Final     RDW 04/24/2023 14.6  10.0 - 15.0 % Final     Platelet Count 04/24/2023 165  150 - 450 10e3/uL Final           Answers for HPI/ROS submitted by the patient on 5/4/2023  PAVAN 7 TOTAL SCORE: 14

## 2023-05-04 ENCOUNTER — OFFICE VISIT (OUTPATIENT)
Dept: FAMILY MEDICINE | Facility: CLINIC | Age: 78
End: 2023-05-04
Payer: MEDICARE

## 2023-05-04 ENCOUNTER — TELEPHONE (OUTPATIENT)
Dept: FAMILY MEDICINE | Facility: CLINIC | Age: 78
End: 2023-05-04

## 2023-05-04 VITALS
HEIGHT: 70 IN | WEIGHT: 171 LBS | SYSTOLIC BLOOD PRESSURE: 124 MMHG | BODY MASS INDEX: 24.48 KG/M2 | RESPIRATION RATE: 16 BRPM | DIASTOLIC BLOOD PRESSURE: 65 MMHG | OXYGEN SATURATION: 90 % | HEART RATE: 53 BPM | TEMPERATURE: 97.7 F

## 2023-05-04 DIAGNOSIS — Z91.81 AT HIGH RISK FOR INJURY RELATED TO FALL: ICD-10-CM

## 2023-05-04 DIAGNOSIS — M05.711 RHEUMATOID ARTHRITIS INVOLVING RIGHT SHOULDER WITH POSITIVE RHEUMATOID FACTOR (H): ICD-10-CM

## 2023-05-04 DIAGNOSIS — J44.9 COPD, VERY SEVERE (H): ICD-10-CM

## 2023-05-04 DIAGNOSIS — G47.33 OSA (OBSTRUCTIVE SLEEP APNEA): ICD-10-CM

## 2023-05-04 DIAGNOSIS — S22.32XD CLOSED FRACTURE OF ONE RIB OF LEFT SIDE WITH ROUTINE HEALING, SUBSEQUENT ENCOUNTER: ICD-10-CM

## 2023-05-04 DIAGNOSIS — C73 METASTASIS FROM THYROID CANCER (H): ICD-10-CM

## 2023-05-04 DIAGNOSIS — C79.9 METASTASIS FROM THYROID CANCER (H): ICD-10-CM

## 2023-05-04 DIAGNOSIS — S36.029D SPLEEN HEMATOMA, SUBSEQUENT ENCOUNTER: Primary | ICD-10-CM

## 2023-05-04 DIAGNOSIS — I25.10 CORONARY ARTERY DISEASE INVOLVING NATIVE CORONARY ARTERY OF NATIVE HEART WITHOUT ANGINA PECTORIS: ICD-10-CM

## 2023-05-04 LAB
CHOLEST SERPL-MCNC: 146 MG/DL
ERYTHROCYTE [DISTWIDTH] IN BLOOD BY AUTOMATED COUNT: 14.6 % (ref 10–15)
HCT VFR BLD AUTO: 43.1 % (ref 40–53)
HDLC SERPL-MCNC: 52 MG/DL
HGB BLD-MCNC: 13.7 G/DL (ref 13.3–17.7)
LDLC SERPL CALC-MCNC: 69 MG/DL
MCH RBC QN AUTO: 29.1 PG (ref 26.5–33)
MCHC RBC AUTO-ENTMCNC: 31.8 G/DL (ref 31.5–36.5)
MCV RBC AUTO: 92 FL (ref 78–100)
NONHDLC SERPL-MCNC: 94 MG/DL
PLATELET # BLD AUTO: 155 10E3/UL (ref 150–450)
RBC # BLD AUTO: 4.71 10E6/UL (ref 4.4–5.9)
TRIGL SERPL-MCNC: 127 MG/DL
WBC # BLD AUTO: 9 10E3/UL (ref 4–11)

## 2023-05-04 PROCEDURE — 99495 TRANSJ CARE MGMT MOD F2F 14D: CPT | Mod: 25 | Performed by: INTERNAL MEDICINE

## 2023-05-04 PROCEDURE — 91312 COVID-19 BIVALENT 12+ (PFIZER): CPT | Performed by: INTERNAL MEDICINE

## 2023-05-04 PROCEDURE — 80061 LIPID PANEL: CPT | Performed by: INTERNAL MEDICINE

## 2023-05-04 PROCEDURE — 85027 COMPLETE CBC AUTOMATED: CPT | Performed by: INTERNAL MEDICINE

## 2023-05-04 PROCEDURE — 0124A COVID-19 BIVALENT 12+ (PFIZER): CPT | Performed by: INTERNAL MEDICINE

## 2023-05-04 PROCEDURE — 36415 COLL VENOUS BLD VENIPUNCTURE: CPT | Performed by: INTERNAL MEDICINE

## 2023-05-04 RX ORDER — LIDOCAINE 50 MG/G
PATCH TOPICAL
Qty: 30 PATCH | Refills: 0 | Status: SHIPPED | OUTPATIENT
Start: 2023-05-04 | End: 2023-06-12

## 2023-05-04 RX ORDER — OXYCODONE HYDROCHLORIDE 5 MG/1
5 TABLET ORAL 3 TIMES DAILY PRN
Qty: 90 TABLET | Refills: 0 | Status: SHIPPED | OUTPATIENT
Start: 2023-05-04 | End: 2023-06-02

## 2023-05-04 ASSESSMENT — ANXIETY QUESTIONNAIRES
GAD7 TOTAL SCORE: 14
IF YOU CHECKED OFF ANY PROBLEMS ON THIS QUESTIONNAIRE, HOW DIFFICULT HAVE THESE PROBLEMS MADE IT FOR YOU TO DO YOUR WORK, TAKE CARE OF THINGS AT HOME, OR GET ALONG WITH OTHER PEOPLE: VERY DIFFICULT
GAD7 TOTAL SCORE: 14
1. FEELING NERVOUS, ANXIOUS, OR ON EDGE: NEARLY EVERY DAY
3. WORRYING TOO MUCH ABOUT DIFFERENT THINGS: NEARLY EVERY DAY
6. BECOMING EASILY ANNOYED OR IRRITABLE: SEVERAL DAYS
7. FEELING AFRAID AS IF SOMETHING AWFUL MIGHT HAPPEN: NOT AT ALL
8. IF YOU CHECKED OFF ANY PROBLEMS, HOW DIFFICULT HAVE THESE MADE IT FOR YOU TO DO YOUR WORK, TAKE CARE OF THINGS AT HOME, OR GET ALONG WITH OTHER PEOPLE?: VERY DIFFICULT
4. TROUBLE RELAXING: NEARLY EVERY DAY
2. NOT BEING ABLE TO STOP OR CONTROL WORRYING: NEARLY EVERY DAY
7. FEELING AFRAID AS IF SOMETHING AWFUL MIGHT HAPPEN: NOT AT ALL
5. BEING SO RESTLESS THAT IT IS HARD TO SIT STILL: SEVERAL DAYS

## 2023-05-04 ASSESSMENT — PAIN SCALES - GENERAL: PAINLEVEL: MODERATE PAIN (5)

## 2023-05-04 NOTE — TELEPHONE ENCOUNTER
PRIOR AUTHORIZATION DENIED    Medication: lidocaine (LIDODERM) 5 % patch - EPA DENIED    Denial Date: 5/4/2023    Denial Rational:       Appeal Information:

## 2023-05-08 DIAGNOSIS — I10 ESSENTIAL (PRIMARY) HYPERTENSION: ICD-10-CM

## 2023-05-09 RX ORDER — METOPROLOL SUCCINATE 50 MG/1
TABLET, EXTENDED RELEASE ORAL
Qty: 90 TABLET | Refills: 2 | Status: ON HOLD | OUTPATIENT
Start: 2023-05-09 | End: 2024-01-10
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
## 2023-05-23 ENCOUNTER — HOSPITAL ENCOUNTER (OUTPATIENT)
Dept: CARDIAC REHAB | Facility: CLINIC | Age: 78
Discharge: HOME | End: 2023-05-23
Attending: INTERNAL MEDICINE
Payer: MEDICARE

## 2023-05-23 DIAGNOSIS — J42 CHRONIC BRONCHITIS, UNSPECIFIED CHRONIC BRONCHITIS TYPE (H): ICD-10-CM

## 2023-05-23 NOTE — PROGRESS NOTES
Patient presented for a pulmonary rehab evaluation today. After discussing the details of the program and patient's multiple recent falls he was encouraged to participate in physical therapy prior to pulmonary rehab. Patient was agreeable to this and mentioned that he fell again a few days ago. He already has a referral in place so he was given the central scheduling number and pulmonary rehab evaluation was rescheduled for 8/1.

## 2023-05-31 ENCOUNTER — OFFICE VISIT (OUTPATIENT)
Dept: CARDIOLOGY | Facility: CLINIC | Age: 78
End: 2023-05-31
Payer: MEDICARE

## 2023-05-31 VITALS
WEIGHT: 174.7 LBS | BODY MASS INDEX: 25.01 KG/M2 | OXYGEN SATURATION: 92 % | HEART RATE: 62 BPM | SYSTOLIC BLOOD PRESSURE: 111 MMHG | DIASTOLIC BLOOD PRESSURE: 65 MMHG | HEIGHT: 70 IN

## 2023-05-31 DIAGNOSIS — R91.8 PULMONARY NODULES: ICD-10-CM

## 2023-05-31 DIAGNOSIS — M05.79 RHEUMATOID ARTHRITIS INVOLVING MULTIPLE SITES WITH POSITIVE RHEUMATOID FACTOR (H): ICD-10-CM

## 2023-05-31 DIAGNOSIS — E78.5 HYPERLIPIDEMIA LDL GOAL <100: ICD-10-CM

## 2023-05-31 DIAGNOSIS — G47.33 OSA (OBSTRUCTIVE SLEEP APNEA): ICD-10-CM

## 2023-05-31 DIAGNOSIS — I25.10 CORONARY ARTERY DISEASE INVOLVING NATIVE CORONARY ARTERY OF NATIVE HEART WITHOUT ANGINA PECTORIS: Primary | ICD-10-CM

## 2023-05-31 DIAGNOSIS — J44.9 COPD, VERY SEVERE (H): ICD-10-CM

## 2023-05-31 DIAGNOSIS — C73 METASTASIS FROM THYROID CANCER (H): ICD-10-CM

## 2023-05-31 DIAGNOSIS — C79.9 METASTASIS FROM THYROID CANCER (H): ICD-10-CM

## 2023-05-31 PROCEDURE — 99214 OFFICE O/P EST MOD 30 MIN: CPT | Performed by: INTERNAL MEDICINE
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
## 2023-06-02 ENCOUNTER — NURSE TRIAGE (OUTPATIENT)
Dept: FAMILY MEDICINE | Facility: CLINIC | Age: 78
End: 2023-06-02
Payer: MEDICARE

## 2023-06-02 DIAGNOSIS — I10 ESSENTIAL HYPERTENSION, BENIGN: ICD-10-CM

## 2023-06-02 DIAGNOSIS — C79.9 METASTASIS FROM THYROID CANCER (H): ICD-10-CM

## 2023-06-02 DIAGNOSIS — C73 METASTASIS FROM THYROID CANCER (H): ICD-10-CM

## 2023-06-02 RX ORDER — OXYCODONE HYDROCHLORIDE 5 MG/1
5 TABLET ORAL 3 TIMES DAILY PRN
Qty: 90 TABLET | Refills: 0 | Status: ON HOLD | OUTPATIENT
Start: 2023-06-02 | End: 2023-06-16

## 2023-06-02 RX ORDER — FUROSEMIDE 20 MG
TABLET ORAL
Qty: 90 TABLET | Refills: 3 | Status: ON HOLD | OUTPATIENT
Start: 2023-06-02 | End: 2024-01-10
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
## 2023-06-02 NOTE — TELEPHONE ENCOUNTER
"Spoke with patient     Having new leg weakness on on one side/leg, started a month ago but now it's worse     Also said recently having new word finding difficulty - has been \"going on a while\" -     Friend is an ER provider and advised he getting imaging done to be screened for stroke - recommended pt go to ER with new stroke-like symptoms but pt states he won't go to the ER     Last night fell onto back getting out of car going to friends house. Fell because of his leg weakness and continues to have falls     Pt also does not want to do a VV, States \"I can't tell Dr Dozier anything different than I told you. I don't want to waste her time. If you think I need an MRI order that.\"     Please advise     Zaria HAYS, Triage RN  [facility]     Yaneli Dozier MD  Cs Triage Im 5 minutes ago (3:04 PM)     BK  Does he need to be seen   Happy to add vv on Thursday if needed        "

## 2023-06-02 NOTE — TELEPHONE ENCOUNTER
Yaneli Dozier MD  Cs Triage Im Just now (3:27 PM)     BK  I wish she called sooner because this was a perfect ADS case   Please asked them if they would still see him      Huddled with ADS, they can't accept case

## 2023-06-02 NOTE — TELEPHONE ENCOUNTER
"Patient was called with providers message.    Yaneli Dozier MD Anton, Laura B RN; Cs Triage Im 1 hour ago (3:37 PM)     BK  Sorry yes, he is late in calling us   Should be seen   [facility] is ok or ED      \"Im not going to go to ER and sit like a fool\". Tell her I said that.   "

## 2023-06-05 NOTE — TELEPHONE ENCOUNTER
Huddled with ADS provider who is able to accept patient. ADS nurse will reach out to patient directly to schedule.    Closing triage encounter.    Ana Gong RN  [facility]

## 2023-06-05 NOTE — TELEPHONE ENCOUNTER
"    Writer huddled with RN lead, advised writer speak with patient to get update of current symptoms. If symptoms have not worsened - plan to huddle with ADS provider to inquire if able to accept patient. If worsening symptoms, patient would need to be seen in ED.    Writer called and spoke with patient. Patient denies any new or worsening symptoms since initial triage on Friday 6/2/23 - states he is having the same ongoing symptoms that have been present for \"a while\".  Left leg weakness and pain for the last few weeks and difficulty word finding for \"a while now\"     Will huddle with ADS to determine if able to accept patient    Callback to patient-  [phone]     Ana Gong RN  [facility]  "

## 2023-06-05 NOTE — TELEPHONE ENCOUNTER
Patient called and talked with the ADS nurse. Patient asked to be seen tomorrow. ADS nurse scheduled the patient for 6/6/23 at 1pm.  Accepted by the ADS provider.     ROGERS Toro  [facility]

## 2023-06-06 ENCOUNTER — ANCILLARY PROCEDURE (OUTPATIENT)
Dept: CT IMAGING | Facility: CLINIC | Age: 78
End: 2023-06-06
Attending: FAMILY MEDICINE
Payer: MEDICARE

## 2023-06-06 ENCOUNTER — OFFICE VISIT (OUTPATIENT)
Dept: PEDIATRICS | Facility: CLINIC | Age: 78
End: 2023-06-06
Payer: MEDICARE

## 2023-06-06 VITALS
DIASTOLIC BLOOD PRESSURE: 80 MMHG | WEIGHT: 169.97 LBS | BODY MASS INDEX: 24.39 KG/M2 | SYSTOLIC BLOOD PRESSURE: 157 MMHG | HEART RATE: 81 BPM | TEMPERATURE: 98.7 F
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
## 2023-06-06 DIAGNOSIS — M25.552 HIP PAIN, LEFT: ICD-10-CM

## 2023-06-06 DIAGNOSIS — Z86.73 CEREBROVASCULAR ACCIDENT, OLD: ICD-10-CM

## 2023-06-06 PROCEDURE — 72131 CT LUMBAR SPINE W/O DYE: CPT | Mod: MF

## 2023-06-06 PROCEDURE — 72192 CT PELVIS W/O DYE: CPT | Mod: MG

## 2023-06-06 PROCEDURE — G1010 CDSM STANSON: HCPCS

## 2023-06-06 PROCEDURE — 99214 OFFICE O/P EST MOD 30 MIN: CPT

## 2023-06-06 RX ORDER — KETOROLAC TROMETHAMINE 30 MG/ML
30 INJECTION, SOLUTION INTRAMUSCULAR; INTRAVENOUS ONCE
Status: CANCELLED | OUTPATIENT
Start: 2023-06-06 | End: 2023-06-06

## 2023-06-06 NOTE — PROGRESS NOTES
"  Assessment & Plan   Problem List Items Addressed This Visit     Fall, initial encounter - Primary    Relevant Orders    CT Head w/o Contrast (Completed)    CT Lumbar Spine w/o Contrast (Completed)    CT Pelvis Bone wo Contrast   Other Visit Diagnoses     Cerebrovascular accident, old             [facility] PROVIDER  [facility] LAN Terry is a 77 year old, presenting for the following health issues:  Neurologic Problem (Stroke- like a month to three weeks )    HPI     Evaluation of Neurologic Symptoms  Onset/Duration: A month ago but progressively worse  Description:  Weakness/location: speech slow and leg weekness   Numbness/tingling: foot pain   Headaches: no   History of migraines: no   Other pain: YES- hip pain, fell last week hip painful  Dizziness: no   Visual changes: no   Speech changes: YES  Memory changes: YES- \" forgetful\"  Personality changes: no            Loss or change of consciousness: no   Progression of symptoms worsening  Accompanying signs and symptoms:  Fever: no    Stiff neck: no   Neck or upper back pain:  YES  ENT or URI symptoms: no            Nausea or vomiting: no   History    Head or other trauma: YES- fell twice last week, once in tub and once on driveway            Prior evaluation: no     77-year-old male presented today ADS ambulatory with his cane today, patient drove himself here.  Patient states that a month ago he started to have trouble putting his words together, at the same time his left leg felt to be \"dead\" and \" not listening to him.\"   Since then he has fallen twice.  2 weeks ago he fell in his bathtub and in his driveway, needing his neighbor to help him.  He did not call 911 for medical attention.  Since then his left hip has been hurting him, aggravated with walking.  Patient has shortness of breath due to COPD but that has not changed.  He does have chronic low back pain which he uses lidocaine patch for.  There was no " loss of consciousness with the fall.  Patient is on aspirin but no other anticoagulants.  Patient does not think he hit his head.  No bleeding, no open wound or paresthesia.      Review of Systems   See HPI.  No nausea/vomiting.  No fever/chills.  No chest pain/ increased of his usual SOB.  No BM/urine problems.  No dizziness or syncope.        Objective    BP (!) 157/80 (BP Location: Right arm, Patient Position: Chair, Cuff Size: Adult Regular)   Pulse 81   Temp 98.7  F (37.1  C) (Oral)   Wt 77.1 kg (169 lb 15.6 oz)   BMI 24.39 kg/m    Body mass index is 24.39 kg/m .  Physical Exam   GENERAL: alert and mild distress, afebrile, not septic, no cyanosis or accessory muscle use, moist mucous membrane, no meningeal signs; mildly dyspneic from exertion but O2 sat still above 90% at 92%   EYES: Eyes grossly normal to inspection, PERRL and conjunctivae and sclerae normal  HENT: ear canals and TM's normal, nose and mouth without ulcers or lesions  NECK: no adenopathy, no asymmetry, masses, or scars and thyroid normal to palpation; no midline C-spine tenderness or crepitus  RESP: lungs clear to auscultation - no rales, rhonchi or wheezes  CV: regular rate and rhythm, normal S1 S2, no S3 or S4, no murmur, click or rub, no peripheral edema and peripheral pulses strong  ABDOMEN: soft, nontender, no hepatosplenomegaly, no masses and bowel sounds normal  MS: no gross musculoskeletal defects noted, no edema, SLR negative; normal DP PT pulses bilaterally; there were no signs of effusion of the hips or knees or ankles with range of motion intact in all joints;  No C-T-L-S spine midline tenderness, some SI joint tenderness bilaterally with mild muscle spasm bilateral parasacral region;  Standing up from sitting seems to aggravate patient's left hip pain; he was able to walk across the room unassisted  SKIN: no suspicious lesions or rashes  NEURO: Normal strength and tone, mentation intact and speech normal; no tremor, no facial  asymmetry or pronator drift, finger-to-nose normal; cranials 2-12 intact    Imaging:  Offered MRI brain to rule out recent CVA, pt refused but agreed to CT scan of head/brain.    CT scan of head/brain showed:    1.  No acute traumatic intracranial abnormality.  2.  Chronic intracranial and paranasal sinus findings      CT scan of pelvis bone showed:  1.  No acute findings.  2.  Left hip bipolar arthroplasty without complication.  3.  Degenerative changes in the right hip and SI joints.  4.  Partially visualized benign intramuscular lipoma in the right lateral abdominal wall musculature.    CT scan lumbar-sacral spine showed:  1.  No acute lumbar spine fracture.  2.  Severe spinal canal stenosis at L2-L3, L3-L4, and L4-L5.     Assessment:  Old lacunar infarct with chronic microvascular ischemic changes of the brain.  No new intracranial hemorrhage, stroke or lesions.  Left hip pain likely from soft tissue injuries related to recent fall.  Chronic COPD with shortness of breath on exertion.    Plan:  Diclofenac gel prescribed for patient to use for analgesia along with acetaminophen up to 1 g p.o. every 6 hourly as needed.  Patient may also continue with lidocaine patch to use nightly as needed.  Patient asked if he should use his previously prescribed oxycodone for his back pain and I discouraged that since it may increase his risk of fall.    Patient to follow-up with his primary care physician within the next 1 to 2 weeks for continuing care, sooner if no improvement or new problems arise.  Warning signs and symptoms explained, to be seen ASAP if worsening.

## 2023-06-08 ENCOUNTER — THERAPY VISIT (OUTPATIENT)
Dept: PHYSICAL THERAPY | Facility: CLINIC | Age: 78
End: 2023-06-08
Attending: INTERNAL MEDICINE
Payer: MEDICARE

## 2023-06-08 DIAGNOSIS — R26.89 BALANCE PROBLEMS: Primary | ICD-10-CM

## 2023-06-08 DIAGNOSIS — Z74.09 MOBILITY IMPAIRED: ICD-10-CM

## 2023-06-08 DIAGNOSIS — Z91.81 AT HIGH RISK FOR INJURY RELATED TO FALL: ICD-10-CM

## 2023-06-08 PROCEDURE — 97112 NEUROMUSCULAR REEDUCATION: CPT | Mod: GP

## 2023-06-08 PROCEDURE — 97162 PT EVAL MOD COMPLEX 30 MIN: CPT | Mod: GP

## 2023-06-08 NOTE — PROGRESS NOTES
PHYSICAL THERAPY EVALUATION  Type of Visit: Evaluation    See electronic medical record for Abuse and Falls Screening details.    Subjective    Pt is a 78 yo M who was  admitted to the hospital on 4/21/23-4/24/23 due to chest pain related to a fall that had happend 2 weeks prior. On CT it was shown that pt had a fx of the 6th rib and small splenic hematoma. Pt also has COPD rquiring 1 L of O2 at night. Shares that his rib pain is gone, no restrictions because of rib fx. Has had 2 more falls since hospital visit- 1 while in the tub and 1 on the driveway. Shares that since these falls he is having a lot of L hip pain. Shares that his balance feels off with walking and turning- feels that it is due to his L foot, feels that his L foot isn't doing what it's supposed to do. Is catching L toe when going up the stairs. Notes that he has been having trouble getting out of the car, feeling that it is harder to get his L LE out of the car. Notes 4-5 falls in the last 2 years.      Presenting condition or subjective complaint:   Trouble with balance   Date of onset: 05/04/23    Relevant medical history:   COPD, HTN, hypothyroid, RA of R shoulder, CAD  Dates & types of surgery:   L hip replacement, removal of thyroid cancer, radiation for lung cancer     Prior diagnostic imaging/testing results: CT scan; X-ray     Prior therapy history for the same diagnosis, illness or injury: Yes Pt    Prior Level of Function  Using 1 walking stick now, indep prior. Using walking stick for about a month. Feels that he has not had good balance since hip replacement a couple years ago.     Living Environment  Social support: With a significant other or spouse   Type of home: House; 1 level; Basement   Stairs to enter the home: Yes 1 Is there a railing: No   Ramp: No   Stairs inside the home: Yes 14 Is there a railing: Yes   Help at home: Medication and/or finances; Home and Yard maintenance tasks  Equipment owned: Walker with wheels; Bath bench      Employment: No    Hobbies/Interests:   Used to like to do yardwork, fishing, gardening- cant get back up from floor.     Patient goals for therapy:   be able to get up off the floor, build muscles up in legs    Pain assessment: Pain  in L hip- 0/10 in sitting, 8/10 getting up from a chair, doesn't hurt once up. Has back pain, but always had it.      Objective   Cognitive Status Examination  Level of Consciousness: Alert  Follows Commands and Answers Questions: 100% of the time, Follows multi step instructions  Personal Safety and Judgement: Intact  Memory: Intact    INTEGUMENTARY: Intact  POSTURE: Sitting Posture: Rounded shoulders, Forward head, Thoracic kyphosis increased  RANGE OF MOTION: LE ROM WFL  STRENGTH:  4/5 R hip flexion, Little pain with L hip flexion 4-/5, WFL knee flex/extension, DF/PF     TRANSFERS: Independent notes severe L hip pain with transfers     GAIT:   Level of [place]: Contact Guard  Assistive Device(s): Ambulates with 1 walking stick- holding it in the air most of the time   Gait Deviations: Antalgic  Base of support decreased  Stance time decreased  Stride length decreased  Rita decreased    BALANCE: Standing Balance (static):Poor  Standing Balance (dynamic):Poor    SPECIAL TESTS  Functional Gait Assessment (FGA) TOTAL SCORE: (MAXIMUM SCORE 30): 10   Demonstrating increased risk for falls    10 Meter Walk Test (Comfortable)    0.63m/s   Pt is a limited community ambulator    5 Times Sit-to-Stand (5TSTS)  NT due to L hip pain, feels that he wouldn't be able to do even if he didn't have pain.      SENSATION: NT, will assess further as needed     REFLEXES: NT, will assess further as needed   COORDINATION: NT, will assess further as needed   MUSCLE TONE: NT, will assess further as needed     Assessment & Plan   CLINICAL IMPRESSIONS   Medical Diagnosis: At high risk for injury related to fall    Treatment Diagnosis: Force production deficit, sensory detection deficit    Impression/Assessment: Patient is a 77 year old male with reports of decreased balance.  The following significant findings have been identified: Pain, Decreased strength, Impaired balance, Decreased proprioception, Impaired gait, Decreased activity tolerance and Impaired posture. These impairments interfere with their ability to perform self care tasks, recreational activities, household chores, household mobility and community mobility as compared to previous level of function.     Clinical Decision Making (Complexity):   Clinical Presentation: Evolving/Changing  Clinical Presentation Rationale: based on medical and personal factors listed in PT evaluation  Clinical Decision Making (Complexity): Moderate complexity    PLAN OF CARE  Treatment Interventions:  Modalities: Per provider discretion  Interventions: Gait Training, Manual Therapy, Neuromuscular Re-education, Therapeutic Activity, Therapeutic Exercise, Standardized Testing    Long Term Goals     PT Goal 1  Goal Identifier: HEP  Goal Description: Pt will be able to demonstrate HEP activities without need for cues from provider to demonstrate understanding and independence with HEP.  Rationale: to maximize safety and independence with performance of ADLs and functional tasks  Target Date: 08/31/23  PT Goal 2  Goal Identifier: Functional mobility  Goal Description: Pt will demonstrate a 4 point improvement on the FGA to demonstrate minimum clinically significant difference in score to demonstrate improved safety with functional mobility and decrease risk of falls.  Rationale: to maximize safety and independence with performance of ADLs and functional tasks  Goal Progress: Eval: 10/30  Target Date: 08/31/23  PT Goal 3  Goal Identifier: Gait speed  Goal Description: Pt demonstrate a 0.12 second improvement in gait speed to demonstrate minimum clinically significant difference in score to demonstrate improved gait velocity.  Rationale: to maximize safety and  independence with performance of ADLs and functional tasks  Goal Progress: Eval: 0.63m/s  Target Date: 08/31/23  PT Goal 4  Goal Identifier: LE strength  Goal Description: Pt will be able to perform at least 3 STS without UE support to demonstrate appropriate LE strength for community dwelling adults.  Rationale: to maximize safety and independence with performance of ADLs and functional tasks  Goal Progress: NT due to L hip pain  Target Date: 08/31/23      Frequency of Treatment: 2x/week  Duration of Treatment: 90 days    Education Assessment:   Learner/Method: Patient;Demonstration;Pictures/Video;Listening    Risks and benefits of evaluation/treatment have been explained.   Patient/Family/caregiver agrees with Plan of Care.     Evaluation Time:     PT Eval, Moderate Complexity Minutes (36826): 36  Signing Clinician: Pinky Gold, PT, DPT    [facility]                                                                                   OUTPATIENT PHYSICAL THERAPY      PLAN OF TREATMENT FOR OUTPATIENT REHABILITATION   Patient's Last Name, First Name, Harrison Johnson YOB: 1945   Provider's Name   [facility]   Medical Record No.  [id number]     Onset Date: 05/04/23  Start of Care Date: 06/08/23     Medical Diagnosis:  At high risk for injury related to fall    PT Treatment Diagnosis:  Force production deficit, sensory detection deficit Plan of Treatment  Frequency/Duration: 2x/week/ 90 days    Certification date from 06/08/23 to 09/05/23         See note for plan of treatment details and functional goals     Pinky Gold PT, DPT                         I CERTIFY THE NEED FOR THESE SERVICES FURNISHED UNDER        THIS PLAN OF TREATMENT AND WHILE UNDER MY CARE     (Physician attestation of this document indicates review and certification of the therapy plan).              Referring Provider:  Yaneli Amato  Assessment See Epic Evaluation- Start of Care Date: 06/08/23

## 2023-06-12 ENCOUNTER — HOSPITAL ENCOUNTER (INPATIENT)
Facility: CLINIC | Age: 78
LOS: 5 days | Discharge: NURSING, REHAB OR LONG-TERM CARE | DRG: 956 | End: 2023-06-17
Attending: EMERGENCY MEDICINE | Admitting: HOSPITALIST
Payer: MEDICARE

## 2023-06-12 ENCOUNTER — APPOINTMENT (OUTPATIENT)
Dept: GENERAL RADIOLOGY | Facility: CLINIC | Age: 78
DRG: 956 | End: 2023-06-12
Attending: EMERGENCY MEDICINE
Payer: MEDICARE

## 2023-06-12 ENCOUNTER — APPOINTMENT (OUTPATIENT)
Dept: CT IMAGING | Facility: CLINIC | Age: 78
DRG: 956 | End: 2023-06-12
Attending: EMERGENCY MEDICINE
Payer: MEDICARE

## 2023-06-12 ENCOUNTER — APPOINTMENT (OUTPATIENT)
Dept: GENERAL RADIOLOGY | Facility: CLINIC | Age: 78
DRG: 956 | End: 2023-06-12
Attending: PHYSICIAN ASSISTANT
Payer: MEDICARE

## 2023-06-12 ENCOUNTER — APPOINTMENT (OUTPATIENT)
Dept: CT IMAGING | Facility: CLINIC | Age: 78
DRG: 956 | End: 2023-06-12
Attending: NURSE PRACTITIONER
Payer: MEDICARE

## 2023-06-12 DIAGNOSIS — L28.2 PRURITIC RASH: ICD-10-CM

## 2023-06-12 DIAGNOSIS — S22.39XA CLOSED FRACTURE OF ONE RIB, UNSPECIFIED LATERALITY, INITIAL ENCOUNTER: ICD-10-CM
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
## 2023-06-12 DIAGNOSIS — I60.9 SAH (SUBARACHNOID HEMORRHAGE) (H): ICD-10-CM

## 2023-06-12 DIAGNOSIS — S01.81XA FACIAL LACERATION, INITIAL ENCOUNTER: ICD-10-CM

## 2023-06-12 DIAGNOSIS — S72.001A CLOSED FRACTURE OF NECK OF RIGHT FEMUR, INITIAL ENCOUNTER (H): ICD-10-CM

## 2023-06-12 LAB
ABO/RH(D): NORMAL
ANION GAP SERPL CALCULATED.3IONS-SCNC: 10 MMOL/L (ref 7–15)
ANTIBODY SCREEN: NEGATIVE
ATRIAL RATE - MUSE: 58 BPM
BUN SERPL-MCNC: 13.3 MG/DL (ref 8–23)
CALCIUM SERPL-MCNC: 9 MG/DL (ref 8.8–10.2)
CHLORIDE SERPL-SCNC: 102 MMOL/L (ref 98–107)
CREAT SERPL-MCNC: 0.9 MG/DL (ref 0.67–1.17)
DEPRECATED HCO3 PLAS-SCNC: 28 MMOL/L (ref 22–29)
DIASTOLIC BLOOD PRESSURE - MUSE: NORMAL MMHG
ERYTHROCYTE [DISTWIDTH] IN BLOOD BY AUTOMATED COUNT: 14.5 % (ref 10–15)
GFR SERPL CREATININE-BSD FRML MDRD: 89 ML/MIN/1.73M2
GLUCOSE SERPL-MCNC: 90 MG/DL (ref 70–99)
HCT VFR BLD AUTO: 42.7 % (ref 40–53)
HGB BLD-MCNC: 14 G/DL (ref 13.3–17.7)
INTERPRETATION ECG - MUSE: NORMAL
MCH RBC QN AUTO: 29.7 PG (ref 26.5–33)
MCHC RBC AUTO-ENTMCNC: 32.8 G/DL (ref 31.5–36.5)
MCV RBC AUTO: 91 FL (ref 78–100)
P AXIS - MUSE: 17 DEGREES
PLATELET # BLD AUTO: 194 10E3/UL (ref 150–450)
POTASSIUM SERPL-SCNC: 4.3 MMOL/L (ref 3.4–5.3)
PR INTERVAL - MUSE: 188 MS
QRS DURATION - MUSE: 74 MS
QT - MUSE: 444 MS
QTC - MUSE: 435 MS
R AXIS - MUSE: 26 DEGREES
RADIOLOGIST FLAGS: ABNORMAL
RBC # BLD AUTO: 4.72 10E6/UL (ref 4.4–5.9)
SODIUM SERPL-SCNC: 140 MMOL/L (ref 136–145)
SPECIMEN EXPIRATION DATE: NORMAL
SYSTOLIC BLOOD PRESSURE - MUSE: NORMAL MMHG
T AXIS - MUSE: 43 DEGREES
VENTRICULAR RATE- MUSE: 58 BPM
WBC # BLD AUTO: 10.1 10E3/UL (ref 4–11)

## 2023-06-12 PROCEDURE — 93005 ELECTROCARDIOGRAM TRACING: CPT

## 2023-06-12 PROCEDURE — 120N000001 HC R&B MED SURG/OB

## 2023-06-12 PROCEDURE — G1010 CDSM STANSON: HCPCS

## 2023-06-12 PROCEDURE — 99223 1ST HOSP IP/OBS HIGH 75: CPT | Mod: AI | Performed by: HOSPITALIST

## 2023-06-12 PROCEDURE — 71046 X-RAY EXAM CHEST 2 VIEWS: CPT

## 2023-06-12 PROCEDURE — 86901 BLOOD TYPING SEROLOGIC RH(D): CPT | Performed by: EMERGENCY MEDICINE

## 2023-06-12 PROCEDURE — 73552 X-RAY EXAM OF FEMUR 2/>: CPT | Mod: RT

## 2023-06-12 PROCEDURE — 250N000011 HC RX IP 250 OP 636: Performed by: EMERGENCY MEDICINE

## 2023-06-12 PROCEDURE — 99221 1ST HOSP IP/OBS SF/LOW 40: CPT | Performed by: NURSE PRACTITIONER

## 2023-06-12 PROCEDURE — 73030 X-RAY EXAM OF SHOULDER: CPT | Mod: RT

## 2023-06-12 PROCEDURE — 250N000013 HC RX MED GY IP 250 OP 250 PS 637: Performed by: EMERGENCY MEDICINE

## 2023-06-12 PROCEDURE — 85027 COMPLETE CBC AUTOMATED: CPT | Performed by: EMERGENCY MEDICINE

## 2023-06-12 PROCEDURE — 99285 EMERGENCY DEPT VISIT HI MDM: CPT | Mod: 25

## 2023-06-12 PROCEDURE — 80048 BASIC METABOLIC PNL TOTAL CA: CPT | Performed by: EMERGENCY MEDICINE

## 2023-06-12 PROCEDURE — 0HQ1XZZ REPAIR FACE SKIN, EXTERNAL APPROACH: ICD-10-PCS | Performed by: EMERGENCY MEDICINE

## 2023-06-12 PROCEDURE — 250N000011 HC RX IP 250 OP 636: Performed by: HOSPITALIST

## 2023-06-12 PROCEDURE — 82306 VITAMIN D 25 HYDROXY: CPT | Performed by: PHYSICIAN ASSISTANT

## 2023-06-12 PROCEDURE — 36415 COLL VENOUS BLD VENIPUNCTURE: CPT | Performed by: EMERGENCY MEDICINE

## 2023-06-12 PROCEDURE — 250N000013 HC RX MED GY IP 250 OP 250 PS 637: Performed by: HOSPITALIST

## 2023-06-12 PROCEDURE — 12013 RPR F/E/E/N/L/M 2.6-5.0 CM: CPT

## 2023-06-12 PROCEDURE — 73502 X-RAY EXAM HIP UNI 2-3 VIEWS: CPT

## 2023-06-12 RX ORDER — ONDANSETRON 2 MG/ML
4 INJECTION INTRAMUSCULAR; INTRAVENOUS ONCE
Status: DISCONTINUED | OUTPATIENT
Start: 2023-06-12 | End: 2023-06-12

## 2023-06-12 RX ORDER — TRANEXAMIC ACID 10 MG/ML
1 INJECTION, SOLUTION INTRAVENOUS ONCE
Status: COMPLETED | OUTPATIENT
Start: 2023-06-12 | End: 2023-06-13

## 2023-06-12 RX ORDER — HYDROMORPHONE HYDROCHLORIDE 1 MG/ML
0.5 INJECTION, SOLUTION INTRAMUSCULAR; INTRAVENOUS; SUBCUTANEOUS
Status: DISCONTINUED | OUTPATIENT
Start: 2023-06-12 | End: 2023-06-12

## 2023-06-12 RX ORDER — CEFAZOLIN SODIUM 2 G/100ML
2 INJECTION, SOLUTION INTRAVENOUS SEE ADMIN INSTRUCTIONS
Status: DISCONTINUED | OUTPATIENT
Start: 2023-06-12 | End: 2023-06-14 | Stop reason: HOSPADM

## 2023-06-12 RX ORDER — PREDNISONE 1 MG/1
2 TABLET ORAL DAILY
Status: DISCONTINUED | OUTPATIENT
Start: 2023-06-13 | End: 2023-06-17 | Stop reason: HOSPADM

## 2023-06-12 RX ORDER — AMLODIPINE BESYLATE 5 MG/1
5 TABLET ORAL AT BEDTIME
Status: DISCONTINUED | OUTPATIENT
Start: 2023-06-12 | End: 2023-06-17 | Stop reason: HOSPADM

## 2023-06-12 RX ORDER — POLYETHYLENE GLYCOL 3350 17 G/17G
17 POWDER, FOR SOLUTION ORAL 2 TIMES DAILY PRN
Status: DISCONTINUED | OUTPATIENT
Start: 2023-06-12 | End: 2023-06-17 | Stop reason: HOSPADM

## 2023-06-12 RX ORDER — CYCLOBENZAPRINE HCL 10 MG
10 TABLET ORAL 3 TIMES DAILY PRN
Status: DISCONTINUED | OUTPATIENT
Start: 2023-06-12 | End: 2023-06-13

## 2023-06-12 RX ORDER — GUAIFENESIN 600 MG/1
600 TABLET, EXTENDED RELEASE ORAL 2 TIMES DAILY PRN
Status: DISCONTINUED | OUTPATIENT
Start: 2023-06-12 | End: 2023-06-17 | Stop reason: HOSPADM

## 2023-06-12 RX ORDER — MULTIPLE VITAMINS W/ MINERALS TAB 9MG-400MCG
1 TAB ORAL DAILY
Status: DISCONTINUED | OUTPATIENT
Start: 2023-06-13 | End: 2023-06-17 | Stop reason: HOSPADM

## 2023-06-12 RX ORDER — HYDRALAZINE HYDROCHLORIDE 20 MG/ML
10-20 INJECTION INTRAMUSCULAR; INTRAVENOUS
Status: DISCONTINUED | OUTPATIENT
Start: 2023-06-12 | End: 2023-06-17 | Stop reason: HOSPADM

## 2023-06-12 RX ORDER — ONDANSETRON 4 MG/1
4 TABLET, ORALLY DISINTEGRATING ORAL EVERY 6 HOURS PRN
Status: DISCONTINUED | OUTPATIENT
Start: 2023-06-12 | End: 2023-06-17 | Stop reason: HOSPADM

## 2023-06-12 RX ORDER — VITAMIN B COMPLEX
25 TABLET ORAL DAILY
Status: DISCONTINUED | OUTPATIENT
Start: 2023-06-13 | End: 2023-06-17 | Stop reason: HOSPADM

## 2023-06-12 RX ORDER — LIDOCAINE 40 MG/G
CREAM TOPICAL
Status: DISCONTINUED | OUTPATIENT
Start: 2023-06-12 | End: 2023-06-15

## 2023-06-12 RX ORDER — ALBUTEROL SULFATE 0.83 MG/ML
1.25 SOLUTION RESPIRATORY (INHALATION) EVERY 4 HOURS PRN
Status: DISCONTINUED | OUTPATIENT
Start: 2023-06-12 | End: 2023-06-17 | Stop reason: HOSPADM

## 2023-06-12 RX ORDER — METOPROLOL SUCCINATE 50 MG/1
50 TABLET, EXTENDED RELEASE ORAL EVERY EVENING
Status: DISCONTINUED | OUTPATIENT
Start: 2023-06-12 | End: 2023-06-17 | Stop reason: HOSPADM

## 2023-06-12 RX ORDER — OXYCODONE HYDROCHLORIDE 5 MG/1
5 TABLET ORAL EVERY 4 HOURS PRN
Status: DISCONTINUED | OUTPATIENT
Start: 2023-06-12 | End: 2023-06-13

## 2023-06-12 RX ORDER — ONDANSETRON 2 MG/ML
4 INJECTION INTRAMUSCULAR; INTRAVENOUS EVERY 6 HOURS PRN
Status: DISCONTINUED | OUTPATIENT
Start: 2023-06-12 | End: 2023-06-17 | Stop reason: HOSPADM

## 2023-06-12 RX ORDER — ROSUVASTATIN CALCIUM 10 MG/1
10 TABLET, COATED ORAL EVERY EVENING
Status: DISCONTINUED | OUTPATIENT
Start: 2023-06-12 | End: 2023-06-17 | Stop reason: HOSPADM

## 2023-06-12 RX ORDER — AZITHROMYCIN 250 MG/1
500 TABLET, FILM COATED ORAL EVERY OTHER DAY
Status: DISCONTINUED | OUTPATIENT
Start: 2023-06-13 | End: 2023-06-17 | Stop reason: HOSPADM

## 2023-06-12 RX ORDER — CHOLECALCIFEROL (VITAMIN D3) 50 MCG
50 TABLET ORAL DAILY
Status: DISCONTINUED | OUTPATIENT
Start: 2023-06-12 | End: 2023-06-17 | Stop reason: HOSPADM

## 2023-06-12 RX ORDER — ACETAMINOPHEN 500 MG
1000 TABLET ORAL ONCE
Status: COMPLETED | OUTPATIENT
Start: 2023-06-12 | End: 2023-06-12

## 2023-06-12 RX ORDER — NITROGLYCERIN 0.4 MG/1
0.4 TABLET SUBLINGUAL EVERY 5 MIN PRN
Status: DISCONTINUED | OUTPATIENT
Start: 2023-06-12 | End: 2023-06-17 | Stop reason: HOSPADM

## 2023-06-12 RX ORDER — ACETAMINOPHEN 325 MG/1
975 TABLET ORAL EVERY 8 HOURS
Status: DISCONTINUED | OUTPATIENT
Start: 2023-06-12 | End: 2023-06-17 | Stop reason: HOSPADM

## 2023-06-12 RX ORDER — DULOXETIN HYDROCHLORIDE 20 MG/1
20 CAPSULE, DELAYED RELEASE ORAL AT BEDTIME
Status: DISCONTINUED | OUTPATIENT
Start: 2023-06-12 | End: 2023-06-17 | Stop reason: HOSPADM

## 2023-06-12 RX ORDER — HYDROXYZINE HYDROCHLORIDE 10 MG/1
10 TABLET, FILM COATED ORAL EVERY 6 HOURS PRN
Status: DISCONTINUED | OUTPATIENT
Start: 2023-06-12 | End: 2023-06-16

## 2023-06-12 RX ORDER — LEVOTHYROXINE SODIUM 137 UG/1
137 TABLET ORAL DAILY
Status: DISCONTINUED | OUTPATIENT
Start: 2023-06-13 | End: 2023-06-17 | Stop reason: HOSPADM

## 2023-06-12 RX ORDER — DOCUSATE SODIUM 100 MG/1
100 CAPSULE, LIQUID FILLED ORAL 2 TIMES DAILY
Status: DISCONTINUED | OUTPATIENT
Start: 2023-06-12 | End: 2023-06-17 | Stop reason: HOSPADM

## 2023-06-12 RX ORDER — HYDROMORPHONE HYDROCHLORIDE 1 MG/ML
0.3 INJECTION, SOLUTION INTRAMUSCULAR; INTRAVENOUS; SUBCUTANEOUS
Status: DISCONTINUED | OUTPATIENT
Start: 2023-06-12 | End: 2023-06-17 | Stop reason: HOSPADM

## 2023-06-12 RX ORDER — ALBUTEROL SULFATE 90 UG/1
2 AEROSOL, METERED RESPIRATORY (INHALATION) EVERY 6 HOURS PRN
Status: DISCONTINUED | OUTPATIENT
Start: 2023-06-12 | End: 2023-06-17 | Stop reason: HOSPADM

## 2023-06-12 RX ORDER — FLUTICASONE FUROATE AND VILANTEROL 100; 25 UG/1; UG/1
1 POWDER RESPIRATORY (INHALATION) DAILY
Status: CANCELLED | OUTPATIENT
Start: 2023-06-12

## 2023-06-12 RX ORDER — CEFAZOLIN SODIUM 2 G/100ML
2 INJECTION, SOLUTION INTRAVENOUS
Status: COMPLETED | OUTPATIENT
Start: 2023-06-12 | End: 2023-06-14

## 2023-06-12 RX ADMIN — METOPROLOL SUCCINATE 50 MG: 50 TABLET, EXTENDED RELEASE ORAL at 20:14

## 2023-06-12 RX ADMIN — ACETAMINOPHEN 975 MG: 325 TABLET ORAL at 22:49

## 2023-06-12 RX ADMIN — HYDROMORPHONE HYDROCHLORIDE 0.3 MG: 1 INJECTION, SOLUTION INTRAMUSCULAR; INTRAVENOUS; SUBCUTANEOUS at 22:32

## 2023-06-12 RX ADMIN — OXYCODONE HYDROCHLORIDE 5 MG: 5 TABLET ORAL at 20:40

## 2023-06-12 RX ADMIN — HYDROMORPHONE HYDROCHLORIDE 0.5 MG: 1 INJECTION, SOLUTION INTRAMUSCULAR; INTRAVENOUS; SUBCUTANEOUS at 14:57

## 2023-06-12 RX ADMIN — DOCUSATE SODIUM 100 MG: 100 CAPSULE, LIQUID FILLED ORAL at 22:32

## 2023-06-12 RX ADMIN — HYDROMORPHONE HYDROCHLORIDE 0.5 MG: 1 INJECTION, SOLUTION INTRAMUSCULAR; INTRAVENOUS; SUBCUTANEOUS at 18:43

## 2023-06-12 RX ADMIN — CYCLOBENZAPRINE 10 MG: 10 TABLET, FILM COATED ORAL at 21:45

## 2023-06-12 RX ADMIN — ACETAMINOPHEN 1000 MG: 500 TABLET ORAL at 14:02

## 2023-06-12 RX ADMIN — AMLODIPINE BESYLATE 5 MG: 5 TABLET ORAL at 22:49

## 2023-06-12 RX ADMIN — HYDRALAZINE HYDROCHLORIDE 10 MG: 20 INJECTION INTRAMUSCULAR; INTRAVENOUS at 22:54

## 2023-06-12 RX ADMIN — HYDRALAZINE HYDROCHLORIDE 10 MG: 20 INJECTION INTRAMUSCULAR; INTRAVENOUS at 21:25

## 2023-06-12 RX ADMIN — DULOXETINE HYDROCHLORIDE 20 MG: 20 CAPSULE, DELAYED RELEASE ORAL at 22:32

## 2023-06-12 ASSESSMENT — ACTIVITIES OF DAILY LIVING (ADL)
ADLS_ACUITY_SCORE: 35
ADLS_ACUITY_SCORE: 33
ADLS_ACUITY_SCORE: 35
ADLS_ACUITY_SCORE: 35

## 2023-06-12 NOTE — PHARMACY-ADMISSION MEDICATION HISTORY
Pharmacist Admission Medication History    Admission medication history is complete. The information provided in this note is only as accurate as the sources available at the time of the update.    Medication reconciliation/reorder completed by provider prior to medication history? No    Information Source(s): Patient, Family member and CareEverywhere/SureScripts via in-person and phone (started conversation with patient in room but completed medication review with spouse over the phone as patient had to leave room)    Pertinent Information:   -amlodipine: last filled 1/29/23, 180 tabs for 90-day supply, wife reports taking just PM dose now due to low blood pressures    Changes made to PTA medication list:    Added: None    Deleted: hydromorphone, hydroxyzine, lidocaine 4% patch, lidocaine 5% patch, metoprolol (daily --> every evening), prednisone taper, senna/docusate    Changed: acetaminophen (q8h --> q8h PRN), calcium carbonate (BID --> daily), duloxetine (added note that per spouse, pt occasionally takes a second dose in the AM, Rx last filled 2/17/23 180 capsules for 90-day supply), hydrocortisone (BID --> BID PRN), rosuvastatin (daily --> every evening)     Allergies reviewed with patient and updates made in EHR: unable to assess - wife confirmed list of allergies but could not confirm reactions to medications, patient out of room during interview so unable to review    Medication History Completed By: Michelle Garcia RPH 6/12/2023 2:58 PM    Prior to Admission medications    Medication Sig Last Dose Taking? Auth Provider Long Term End Date   acetaminophen (TYLENOL) 325 MG tablet Take 3 tablets (975 mg) by mouth every 8 hours  Patient taking differently: Take 975 mg by mouth every 8 hours as needed  at PRN Yes Bridget Philippe,      albuterol (ACCUNEB) 1.25 MG/3ML neb solution Take 1 vial (1.25 mg) by nebulization every 4 hours as needed for shortness of breath or wheezing  at PRN Yes Khoa Handy  MD JOCELYNE Yes    albuterol (PROAIR HFA/PROVENTIL HFA/VENTOLIN HFA) 108 (90 Base) MCG/ACT inhaler INHALE 2 PUFFS BY MOUTH EVERY 6 HOURS AS NEEDED FOR WHEEZE OR FOR SHORTNESS OF BREATH  at PRN Yes Yaneli Dozier MD Yes    amLODIPine (NORVASC) 5 MG tablet Take 5 mg by mouth At Bedtime 6/11/2023 at PM Yes Yaneli Dozier MD Yes    ascorbic acid 1000 MG TABS tablet Take 1,000 mg by mouth daily 6/12/2023 at AM Yes Unknown, Entered By History     aspirin 81 MG tablet Take 1 tablet by mouth 2 times daily 6/11/2023 at AM Yes Reported, Patient     azithromycin (ZITHROMAX) 500 MG tablet Take 1 tablet (500 mg) by mouth every other day 6/11/2023 Yes Yaneli Dozier MD     calcium carbonate (OS-KARLIE) 1500 (600 Ca) MG tablet Take 600 mg by mouth daily 6/11/2023 Yes Unknown, Entered By History     cholecalciferol 25 MCG (1000 UT) TABS Take 1,000 Units by mouth daily  6/12/2023 Yes Reported, Patient     cyclobenzaprine (FLEXERIL) 10 MG tablet Take 1 tablet (10 mg) by mouth 3 times daily  Patient taking differently: Take 10 mg by mouth 3 times daily as needed  at PRN Yes Bridget Philippe DO No    diclofenac (VOLTAREN) 1 % topical gel Apply 2 g topically 3 times daily as needed for moderate pain (left hip/back)  at PRN Yes Checo Wilhelm MD     DULoxetine (CYMBALTA) 20 MG capsule Take 1 capsule (20 mg) by mouth At Bedtime  Patient taking differently: Take 20 mg by mouth At Bedtime Occasionally takes a second dose in the AM 6/11/2023 Yes Bridget Philippe DO Yes    Fluticasone-Umeclidin-Vilanterol (TRELEGY ELLIPTA) 100-62.5-25 MCG/INH oral inhaler Inhale 1 puff into the lungs daily 6/12/2023 Yes Yaneli Dozier MD     furosemide (LASIX) 20 MG tablet TAKE 1 TABLET BY MOUTH EVERY DAY 6/12/2023 Yes Yaneli Dozier MD Yes    guaiFENesin (MUCINEX) 600 MG 12 hr tablet Take 600 mg by mouth 2 times daily as needed for congestion  at PRN Yes Reported, Patient     hydrocortisone 2.5 % cream Apply topically 2 times daily  Patient  taking differently: Apply topically 2 times daily as needed for itching  at PRN Yes Bridget Philippe DO     levothyroxine (SYNTHROID/LEVOTHROID) 137 MCG tablet Take 1 tablet (137 mcg) by mouth daily , starting on 9/23/21 6/12/2023 Yes Demarcus Simmons MD Yes    MegaRed Omega-3 Krill Oil 500 MG CAPS Take 500 mg by mouth daily  Yes Yaneli Dozier MD     Melatonin 10 MG TABS tablet Take 10 mg by mouth At Bedtime 6/11/2023 Yes Yaneli Dozier MD     metoprolol succinate ER (TOPROL XL) 50 MG 24 hr tablet TAKE 1 TABLET (50 MG) BY MOUTH DAILY (TAKE WITH 25MG TABLET FOR TOTAL 75MG DAILY)  Patient taking differently: 50 mg every evening 6/11/2023 Yes Yaneli Dozier MD Yes    multivitamin w/minerals (THERA-VIT-M) tablet Take 1 tablet by mouth daily   Yes Reported, Patient     naloxone (NARCAN) 4 MG/0.1ML nasal spray Spray 1 spray (4 mg) into one nostril alternating nostrils once as needed for opioid reversal every 2-3 minutes until assistance arrives  at PRN Yes Yaneli Dozier MD Yes    nitroGLYcerin (NITROSTAT) 0.4 MG sublingual tablet FOR CHEST PAIN PLACE 1 TAB UNDER TONGUE EVERY 5 MIN FOR 3 DOSES. IF SYMPTOMS PERSIST 5 MIN AFTER 1ST DOSE CALL 911  at PRN Yes Yaneli Dozier MD Yes    oxyCODONE (ROXICODONE) 5 MG tablet Take 1 tablet (5 mg) by mouth 3 times daily as needed for pain  at PRN Yes Yaneli Dozier MD No    potassium chloride ER (KLOR-CON M) 20 MEQ CR tablet Take 1 tablet (20 mEq) by mouth daily (with lunch) Prescribed as twice daily but pt is only taking it daily at lunch. 6/11/2023 Yes Bridget Philippe DO     predniSONE (DELTASONE) 1 MG tablet Take 2 tablets (2 mg) by mouth daily For COPD 6/12/2023 Yes Bridget Philippe DO No    rosuvastatin (CRESTOR) 10 MG tablet TAKE 1 TABLET BY MOUTH EVERY DAY  Patient taking differently: Take 10 mg by mouth every evening 6/11/2023 Yes Yaneli Dozier MD Yes
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
## 2023-06-12 NOTE — ED PROVIDER NOTES
"  History     Chief Complaint:  Fall, Head Injury, and Hip Pain       The history is provided by the patient.      Harrison Thomas is a 77 year old male with prior history of hypertension, hyperlipidemia, CAD, MI, and COPD who presents after fall. Patient states that  He was stepping off of a curb when he lost his balance and fell. Patient states he did not have any loss of consciousness, but reports head pain and right hip pain. He reports that he tried to use his right hip but it hurt. Patient was unable to get up on his own and he was wearing his glasses when he fell. Patient used to be a smoker. He denies neck pain and abdominal pain. Patients last Tdap was 12/01/2022.    Independent Historian:   None - Patient Only      Medications:    Amlodipine  Ascorbic acid  Aspirin 81 mg  Azithromycin   Cholecalciferol  Flexeril  Cymbalta   Lasix  Dilaudid  Atarax  Levothyroxine  Toprol XL  Nitrostat  Oxycodone  Potassium chloride  Prednisone   Crestor    Past Medical History:    Arthritis  Asthma  Bruit  CAD  Cancer  Cerebral infarction  COPD  Hypertension  Hyperlipidemia  Immunodeficiency  Melanocytic nevi of lip  Monoclonal paraproteinemia  MI  BRENNEN  Retinal hole syncopal episode  Thyroid nodule  TIA    Past Surgical History:    Biopsy lymph node cervical  Bronchoscopy endobronchial ultrasound  Stent placement  Cataract extraction  Colonoscopy x 2  EGD  Torn retina  PTCA and stenting with ACS  Herniorrhaphy inguinal  Joint placement left  Laser holmium enucleation prostate  Mediastinoscopy  Right meniscus surgery  Thoracoscopy   Thyroidectomy  Lobectomy      Physical Exam     Patient Vitals for the past 24 hrs:   BP Temp Temp src Pulse Resp SpO2 Height Weight   06/12/23 1240 (!) 142/77 98.9  F (37.2  C) Oral 60 16 94 % 1.778 m (5' 10\") 74.8 kg (165 lb)      Physical Exam  Constitutional: Vital signs reviewed as above  General: Alert, pleasant  HEENT: Moist mucous membranes. No facial bone tenderness.   Eyes: Pupils " are equal, round, and reactive to light.   Neck: Normal range of motion  Cardiovascular: normal rate, Regular rhythm and normal heart sounds.  Mo MRG  Pulmonary/Chest: Effort normal and breath sounds normal. No respiratory distress. Patient has no wheezes. Patient has no rales.   Gastrointestinal: Soft. Positive bowel sounds. No MRG.  Musculoskeletal/Extremities: Mild anterior chest tenderness no crepitus. Tender over the right anterior hip. Limited ROM due to pain. No distal leg tenderness or weakness. Distal CMS intact.   Endo: No pitting edema  Neurological: A/O x 3. CN-II-XII intact bilaterally. No pronator drift. Normal strength and sensation throughout all 4 extremities. GCS 15  Skin: Skin is warm and dry. 1 cm laceration above the right lateral eyebrow, bleeding controlled.  Psychiatric: Pleasant    Emergency Department Course   ECG  ECG taken at 1311, ECG read at 1315  Sinus bradycardia  Otherwise normal ECG   No significant changes as compared to prior, dated 01/15/2023.  Rate 58 bpm. NY interval 188 ms. QRS duration 74 ms. QT/QTc 444/435 ms. P-R-T axes 17 26 43.     Imaging:  XR Chest 2 Views   Final Result   IMPRESSION: AP and lateral views of the chest were obtained.   Cardiomediastinal silhouette is within normal limits. Right upper lobe   pulmonary opacity, improved as compared to 4/23/2023 exam, likely   represent improving infection. Mild right basilar pulmonary opacities,   could be atelectasis. No significant pleural effusion or pneumothorax.   No definite acute osseous pathology. If clinical suspicion of rib   fractures, remains high, rib series is more sensitive for detection of   subtle rib fractures.      SAWYER YOUNG MD            SYSTEM ID:  [id number]      XR Pelvis w Hip Right 1 View   Final Result   IMPRESSION:   1. Mildly displaced and impacted right femoral neck fracture, new.   2. Diffuse bone demineralization and arterial calcifications again   noted.   3. Bipolar left hip  hemiarthroplasty again noted.   4. Degenerative changes in the visualized spine again noted.      ALBERT SANTANA MD            SYSTEM ID:  [id number]      Cervical spine CT w/o contrast   Final Result   IMPRESSION: Mild degenerative anterolisthesis of C3 upon C4. Mild   degenerative retrolisthesis of C4 upon C5. Alignment otherwise normal;   however, there is straightening of normal cervical lordosis. Vertebral   body heights of the cervical spine are normal. Craniocervical   alignment is normal. There is no evidence for fracture of the cervical   spine. Loss of disc space height and degenerative endplate spurring at   C4-C5, C5-C6 and C6-C7. Moderate to severe facet arthropathy   throughout the cervical spine. No high-grade spinal canal stenosis. No   prevertebral soft tissue swelling.         Radiation dose for this scan was reduced using automated exposure   control, adjustment of the mA and/or kV according to patient size, or   iterative reconstruction technique      ALANNAH BROWN MD            SYSTEM ID:  [id number]      Head CT w/o contrast   Final Result   Abnormal   IMPRESSION:    1. Small volume subarachnoid hemorrhage at the anterolateral right   frontal lobe likely due to recent trauma.   2. Small right forehead scalp hematoma.   3. Diffuse cerebral volume loss and cerebral white matter changes   consistent with chronic small vessel ischemic disease.       [Critical Result: Small volume intracranial hemorrhage likely due to   recent trauma]      Finding was identified on 6/12/2023 1:52 PM.       YASMIN SAVAGE was contacted by Dr. Brown on 6/12/2023 1:57 PM   and verbalized understanding of the critical result.             Radiation dose for this scan was reduced using automated exposure   control, adjustment of the mA and/or kV according to patient size, or   iterative reconstruction technique.      ALANNAH BROWN MD            SYSTEM ID:  [id number]      XR Femur Right 2 Views    (Results Pending)    XR Shoulder Right G/E 3 Views    (Results Pending)      Report per radiology    Laboratory:  Labs Ordered and Resulted from Time of ED Arrival to Time of ED Departure   CBC WITH PLATELETS - Normal       Result Value    WBC Count 10.1      RBC Count 4.72      Hemoglobin 14.0      Hematocrit 42.7      MCV 91      MCH 29.7      MCHC 32.8      RDW 14.5      Platelet Count 194     BASIC METABOLIC PANEL   VITAMIN D DEFICIENCY SCREENING   TYPE AND SCREEN, ADULT    SPECIMEN EXPIRATION DATE [id number]     ABO/RH TYPE AND SCREEN        Procedures     Laceration Repair      Procedure: Laceration Repair    Indication: Laceration    Consent: Verbal    Location: Right Face     Length: 3 cm    Preparation: Irrigation with Sterile Saline.    Anesthesia/Sedation: Bupivacaine - 0.5%      Treatment/Exploration: Wound explored, no foreign bodies found      Closure: The wound was closed with one layer. Skin/superficial layer was closed with 6 x 5-0 Fast gut absorbable  using Interrupted sutures.     Patient Status: The patient tolerated the procedure well: Yes. There were no complications.      Emergency Department Course & Assessments:     Interventions:  Medications   vitamin D3 (CHOLECALCIFEROL) tablet 50 mcg (has no administration in time range)   HYDROmorphone (PF) (DILAUDID) injection 0.5 mg (has no administration in time range)   ondansetron (ZOFRAN) injection 4 mg (has no administration in time range)   acetaminophen (TYLENOL) tablet 1,000 mg (1,000 mg Oral $Given 6/12/23 9936)      Assessments:  1256 I obtained the history noted above from the patient.    Independent Interpretation (X-rays, CTs, rhythm strip):  CT scan of the head shows a small subarachnoid hemorrhage per my interpretation.  CT scan C-spine's was negative for fracture per my interpretation.  Chest x-ray was negative for acute fracture or pneumothorax per my interpretation.  X-ray of the right hip and pelvis unfortunate does show a femoral neck fracture  per my interpretation.      Consultations/Discussion of Management or Tests:  1356 I talked to Ric Brown from radiology to discuss patients head CT.   1412 I talked to Elvira IBARRA for orthopedics to discuss the patients femoral neck fracture.   1440 I consulted with Dr. Richards of the hospitalist service and discussed patient admission. They accepted care of the patient.      Social Determinants of Health affecting care:   None    Disposition:  The patient was admitted to the hospital under the care of Dr. Richards.     MDM:  Patient presents after missing a step and falling hitting his head.  He complains of a laceration above the right eye, headache, and right hip pain.  He also has a right shoulder pain.  He is not anticoagulated.  There is no loss of consciousness.  I did obtain a CT scan of the head as well as a C-spine's.  While the C-spine CT did not show any acute fractures the CT of the head unfortunate does show small subarachnoid hemorrhage.  I discussed the case with neurosurgery who will see the patient and he will likely get a repeat head CT either late tonight or tomorrow morning.  He is neurologically intact.    Unfortunately he also has an impacted femoral neck fracture.  I discussed the case with Ortho who will plan operative repair tomorrow.  As such he does not need to be n.p.o. until midnight.  They are going to come see him here as well.  He was updated with all these findings.  He has been given Dilaudid for pain control and is feeling better.    Finally, he has a laceration as noted above.  It is very jagged.  This was pieced together with 5-0 fast absorbing chromic gut sutures as per my note.  He tolerated this well.  A Surgifoam dressing was placed to help with the oozing.  Last tetanus shot was 2021.    At this point the patient remained stable and we brought into the hospitalist service for repeat head scan sometime later in the stay as well as operative fixation of his hip.    Diagnosis:     ICD-10-CM    1. Fall, initial encounter  W19.XXXA Case Request: Right total hip arthroplasty     Case Request: Right total hip arthroplasty      2. Closed fracture of neck of right femur, initial encounter (H)  S72.001A Case Request: Right total hip arthroplasty     Case Request: Right total hip arthroplasty      3. SAH (subarachnoid hemorrhage) (H)  I60.9       4. Facial laceration, initial encounter  S01.81XA            Discharge Medications:  New Prescriptions    No medications on file      Scribe Disclosure:  Edgard GONZALES, am serving as a scribe at 1:11 PM on 6/12/2023 to document services personally performed by Nigel Luciano MD based on my observations and the provider's statements to me.   6/12/2023   Nigel Luciano MD Walters, Brent Aaron, MD  06/12/23 [id number]

## 2023-06-12 NOTE — TREATMENT PLAN
Orthopedic Treatment Plan    Patient sustained a mechanical trip and fall on 6/12/23 resulting in a right femoral neck fracture and SAH. Recommend surgical treatment for the right hip fracture with a LIZABETH. This is tentatively scheduled for 6/13/23 pending OR availability, clearance from neurosurgery/neurology, and medical optimization by hospitalist.     -Determine if cleared from neuro standpoint to proceed with surgery. Appreciate recommendations.  -NPO at midnight.   -NWB/bedrest until postop.  -Continue pain regimen.  -No PTA anticoagulation per ED provider and chart review.  -Type and screen pending.  -Vitamin D level and supplementation ordered.    Formal consult to follow either later today vs tomorrow.    Elvira Ulloa PA-C  [facility]

## 2023-06-12 NOTE — ED NOTES
Bed: ED02  Expected date:   Expected time:   Means of arrival:   Comments:  736  77 M fall/head and hip injury  1224
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
## 2023-06-12 NOTE — ED NOTES
[facility]  ED Nurse Handoff Report    ED Chief complaint: Fall, Head Injury, and Hip Pain      ED Diagnosis:   Final diagnoses:   Fall, initial encounter   Closed fracture of neck of right femur, initial encounter (H)       Code Status: MD to discuss    Allergies:   Allergies   Allergen Reactions    Levaquin Difficulty breathing    Plavix [Clopidogrel Bisulfate] Itching    Atorvastatin Calcium Cramps     lipitor    Cats     Dogs     Hctz [Hydrochlorothiazide]      Rash on legs    Sulfasalazine Other (See Comments)     Stomach cramps        Patient Story: Fell forward off of a curb outside gas station. Hit head and has laceration on brow from glasses. Taking aspirin at home. NO loss of consciousness. Recalls events. Right hip pain reported from fall. Bared weight at first and then had subsequent pain. V/S normal on EMS report  Focused Assessment:  Pt reports pain following fall which resulted in right hip fracture and small brain bleed on left frontal lobe. Also laceration on left brow. Patient A&Ox4.     Treatments and/or interventions provided: Xray, Head CT, Labs, pain management  Patient's response to treatments and/or interventions: Tolerated well  XR Chest 2 Views   Final Result   IMPRESSION: AP and lateral views of the chest were obtained.   Cardiomediastinal silhouette is within normal limits. Right upper lobe   pulmonary opacity, improved as compared to 4/23/2023 exam, likely   represent improving infection. Mild right basilar pulmonary opacities,   could be atelectasis. No significant pleural effusion or pneumothorax.   No definite acute osseous pathology. If clinical suspicion of rib   fractures, remains high, rib series is more sensitive for detection of   subtle rib fractures.      SAWYER YOUNG MD            SYSTEM ID:  [id number]      XR Pelvis w Hip Right 1 View   Final Result   IMPRESSION:   1. Mildly displaced and impacted right femoral neck fracture, new.   2. Diffuse bone  demineralization and arterial calcifications again   noted.   3. Bipolar left hip hemiarthroplasty again noted.   4. Degenerative changes in the visualized spine again noted.      ALBERT SANTANA MD            SYSTEM ID:  [id number]      Cervical spine CT w/o contrast   Final Result   IMPRESSION: Mild degenerative anterolisthesis of C3 upon C4. Mild   degenerative retrolisthesis of C4 upon C5. Alignment otherwise normal;   however, there is straightening of normal cervical lordosis. Vertebral   body heights of the cervical spine are normal. Craniocervical   alignment is normal. There is no evidence for fracture of the cervical   spine. Loss of disc space height and degenerative endplate spurring at   C4-C5, C5-C6 and C6-C7. Moderate to severe facet arthropathy   throughout the cervical spine. No high-grade spinal canal stenosis. No   prevertebral soft tissue swelling.         Radiation dose for this scan was reduced using automated exposure   control, adjustment of the mA and/or kV according to patient size, or   iterative reconstruction technique      ALANNAH BROWN MD            SYSTEM ID:  [id number]      Head CT w/o contrast   Final Result   Abnormal   IMPRESSION:    1. Small volume subarachnoid hemorrhage at the anterolateral right   frontal lobe likely due to recent trauma.   2. Small right forehead scalp hematoma.   3. Diffuse cerebral volume loss and cerebral white matter changes   consistent with chronic small vessel ischemic disease.       [Critical Result: Small volume intracranial hemorrhage likely due to   recent trauma]      Finding was identified on 6/12/2023 1:52 PM.       YASMIN SAVAGE was contacted by Dr. Brown on 6/12/2023 1:57 PM   and verbalized understanding of the critical result.             Radiation dose for this scan was reduced using automated exposure   control, adjustment of the mA and/or kV according to patient size, or   iterative reconstruction technique.      ALANNAH BROWN MD  "           SYSTEM ID:  [id number]      XR Femur Right 2 Views    (Results Pending)   XR Shoulder Right G/E 3 Views    (Results Pending)        To be done/followed up on inpatient unit:  Pain management, surgery preparation and physical therapy    Does this patient have any cognitive concerns?:  none    Activity level - Baseline/Home:  Independent  Activity Level - Current:   Total Care    Patient's Preferred language: English   Needed?: No    Isolation: None  Infection: Not Applicable  Patient tested for COVID 19 prior to admission: NO  Bariatric?: No    Vital Signs:   Vitals:    06/12/23 1240   BP: (!) 142/77   Pulse: 60   Resp: 16   Temp: 98.9  F (37.2  C)   TempSrc: Oral   SpO2: 94%   Weight: 74.8 kg (165 lb)   Height: 1.778 m (5' 10\")       Cardiac Rhythm:     Was the PSS-3 completed:   Yes  What interventions are required if any?               Family Comments: none  OBS brochure/video discussed/provided to patient/family: Yes              Name of person given brochure if not patient:               Relationship to patient:     For the majority of the shift this patient's behavior was Green.   Behavioral interventions performed were none.    ED NURSE PHONE NUMBER: *[phone]         "

## 2023-06-13 ENCOUNTER — APPOINTMENT (OUTPATIENT)
Dept: CT IMAGING | Facility: CLINIC | Age: 78
DRG: 956 | End: 2023-06-13
Attending: INTERNAL MEDICINE
Payer: MEDICARE

## 2023-06-13 ENCOUNTER — APPOINTMENT (OUTPATIENT)
Dept: CT IMAGING | Facility: CLINIC | Age: 78
DRG: 956 | End: 2023-06-13
Attending: NURSE PRACTITIONER
Payer: MEDICARE

## 2023-06-13 ENCOUNTER — APPOINTMENT (OUTPATIENT)
Dept: GENERAL RADIOLOGY | Facility: CLINIC | Age: 78
DRG: 956 | End: 2023-06-13
Attending: PHYSICIAN ASSISTANT
Payer: MEDICARE

## 2023-06-13 LAB
ANION GAP SERPL CALCULATED.3IONS-SCNC: 10 MMOL/L (ref 7–15)
BUN SERPL-MCNC: 11.9 MG/DL (ref 8–23)
CALCIUM SERPL-MCNC: 8.9 MG/DL (ref 8.8–10.2)
CHLORIDE SERPL-SCNC: 102 MMOL/L (ref 98–107)
CREAT SERPL-MCNC: 0.73 MG/DL (ref 0.67–1.17)
DEPRECATED CALCIDIOL+CALCIFEROL SERPL-MC: 49 UG/L (ref 20–75)
DEPRECATED HCO3 PLAS-SCNC: 25 MMOL/L (ref 22–29)
GFR SERPL CREATININE-BSD FRML MDRD: >90 ML/MIN/1.73M2
GLUCOSE BLDC GLUCOMTR-MCNC: 70 MG/DL (ref 70–99)
GLUCOSE BLDC GLUCOMTR-MCNC: 91 MG/DL (ref 70–99)
GLUCOSE SERPL-MCNC: 101 MG/DL (ref 70–99)
HCT VFR BLD AUTO: 41 % (ref 40–53)
HGB BLD-MCNC: 13.5 G/DL (ref 13.3–17.7)
INR PPP: 1.09 (ref 0.85–1.15)
POTASSIUM SERPL-SCNC: 4.2 MMOL/L (ref 3.4–5.3)
RADIOLOGIST FLAGS: ABNORMAL
SODIUM SERPL-SCNC: 137 MMOL/L (ref 136–145)

## 2023-06-13 PROCEDURE — 82435 ASSAY OF BLOOD CHLORIDE: CPT | Performed by: HOSPITALIST

## 2023-06-13 PROCEDURE — 250N000012 HC RX MED GY IP 250 OP 636 PS 637: Performed by: HOSPITALIST

## 2023-06-13 PROCEDURE — 71110 X-RAY EXAM RIBS BIL 3 VIEWS: CPT | Mod: 52

## 2023-06-13 PROCEDURE — G1010 CDSM STANSON: HCPCS

## 2023-06-13 PROCEDURE — 999N000157 HC STATISTIC RCP TIME EA 10 MIN

## 2023-06-13 PROCEDURE — 94150 VITAL CAPACITY TEST: CPT

## 2023-06-13 PROCEDURE — 99232 SBSQ HOSP IP/OBS MODERATE 35: CPT | Performed by: HOSPITALIST

## 2023-06-13 PROCEDURE — 36415 COLL VENOUS BLD VENIPUNCTURE: CPT | Performed by: HOSPITALIST

## 2023-06-13 PROCEDURE — 250N000011 HC RX IP 250 OP 636: Performed by: HOSPITALIST

## 2023-06-13 PROCEDURE — 250N000013 HC RX MED GY IP 250 OP 250 PS 637: Performed by: HOSPITALIST

## 2023-06-13 PROCEDURE — 120N000001 HC R&B MED SURG/OB

## 2023-06-13 PROCEDURE — 82374 ASSAY BLOOD CARBON DIOXIDE: CPT | Performed by: HOSPITALIST

## 2023-06-13 PROCEDURE — 85610 PROTHROMBIN TIME: CPT | Performed by: HOSPITALIST

## 2023-06-13 PROCEDURE — 250N000013 HC RX MED GY IP 250 OP 250 PS 637: Performed by: PHYSICIAN ASSISTANT

## 2023-06-13 PROCEDURE — 85018 HEMOGLOBIN: CPT | Performed by: HOSPITALIST

## 2023-06-13 RX ORDER — GABAPENTIN 300 MG/1
300 CAPSULE ORAL 3 TIMES DAILY
Status: DISCONTINUED | OUTPATIENT
Start: 2023-06-13 | End: 2023-06-17 | Stop reason: HOSPADM

## 2023-06-13 RX ORDER — ACETAMINOPHEN 325 MG/1
975 TABLET ORAL EVERY 8 HOURS
Status: DISCONTINUED | OUTPATIENT
Start: 2023-06-13 | End: 2023-06-13

## 2023-06-13 RX ORDER — NALOXONE HYDROCHLORIDE 0.4 MG/ML
0.2 INJECTION, SOLUTION INTRAMUSCULAR; INTRAVENOUS; SUBCUTANEOUS
Status: DISCONTINUED | OUTPATIENT
Start: 2023-06-13 | End: 2023-06-17 | Stop reason: HOSPADM

## 2023-06-13 RX ORDER — NALOXONE HYDROCHLORIDE 0.4 MG/ML
0.4 INJECTION, SOLUTION INTRAMUSCULAR; INTRAVENOUS; SUBCUTANEOUS
Status: DISCONTINUED | OUTPATIENT
Start: 2023-06-13 | End: 2023-06-17 | Stop reason: HOSPADM

## 2023-06-13 RX ORDER — METHOCARBAMOL 500 MG/1
500 TABLET, FILM COATED ORAL EVERY 6 HOURS PRN
Status: DISPENSED | OUTPATIENT
Start: 2023-06-13 | End: 2023-06-16

## 2023-06-13 RX ORDER — OXYCODONE HYDROCHLORIDE 5 MG/1
10 TABLET ORAL EVERY 4 HOURS PRN
Status: DISCONTINUED | OUTPATIENT
Start: 2023-06-13 | End: 2023-06-17 | Stop reason: HOSPADM

## 2023-06-13 RX ORDER — METHOCARBAMOL 500 MG/1
500 TABLET, FILM COATED ORAL 3 TIMES DAILY PRN
Status: DISCONTINUED | OUTPATIENT
Start: 2023-06-13 | End: 2023-06-13

## 2023-06-13 RX ORDER — LIDOCAINE 4 G/G
1 PATCH TOPICAL EVERY 24 HOURS
Status: DISCONTINUED | OUTPATIENT
Start: 2023-06-13 | End: 2023-06-17 | Stop reason: HOSPADM

## 2023-06-13 RX ORDER — OXYCODONE HYDROCHLORIDE 5 MG/1
5 TABLET ORAL EVERY 4 HOURS PRN
Status: DISCONTINUED | OUTPATIENT
Start: 2023-06-13 | End: 2023-06-17 | Stop reason: HOSPADM

## 2023-06-13 RX ADMIN — HYDROMORPHONE HYDROCHLORIDE 0.3 MG: 1 INJECTION, SOLUTION INTRAMUSCULAR; INTRAVENOUS; SUBCUTANEOUS at 22:49

## 2023-06-13 RX ADMIN — Medication 600 MG: at 10:08

## 2023-06-13 RX ADMIN — METHOCARBAMOL 500 MG: 500 TABLET ORAL at 21:14

## 2023-06-13 RX ADMIN — Medication 50 MCG: at 10:14

## 2023-06-13 RX ADMIN — ROSUVASTATIN CALCIUM 10 MG: 10 TABLET, FILM COATED ORAL at 01:10

## 2023-06-13 RX ADMIN — DOCUSATE SODIUM 100 MG: 100 CAPSULE, LIQUID FILLED ORAL at 10:08

## 2023-06-13 RX ADMIN — AMLODIPINE BESYLATE 5 MG: 5 TABLET ORAL at 21:14

## 2023-06-13 RX ADMIN — HYDROMORPHONE HYDROCHLORIDE 0.3 MG: 1 INJECTION, SOLUTION INTRAMUSCULAR; INTRAVENOUS; SUBCUTANEOUS at 11:49

## 2023-06-13 RX ADMIN — ACETAMINOPHEN 975 MG: 325 TABLET ORAL at 05:21

## 2023-06-13 RX ADMIN — AZITHROMYCIN MONOHYDRATE 500 MG: 250 TABLET ORAL at 10:09

## 2023-06-13 RX ADMIN — MULTIPLE VITAMINS W/ MINERALS TAB 1 TABLET: TAB at 10:10

## 2023-06-13 RX ADMIN — HYDRALAZINE HYDROCHLORIDE 10 MG: 20 INJECTION INTRAMUSCULAR; INTRAVENOUS at 11:48

## 2023-06-13 RX ADMIN — Medication 25 MCG: at 10:09

## 2023-06-13 RX ADMIN — GABAPENTIN 300 MG: 300 CAPSULE ORAL at 21:14

## 2023-06-13 RX ADMIN — HYDROMORPHONE HYDROCHLORIDE 0.3 MG: 1 INJECTION, SOLUTION INTRAMUSCULAR; INTRAVENOUS; SUBCUTANEOUS at 19:23

## 2023-06-13 RX ADMIN — PREDNISONE 2 MG: 1 TABLET ORAL at 10:10

## 2023-06-13 RX ADMIN — METHOCARBAMOL 500 MG: 500 TABLET ORAL at 14:02

## 2023-06-13 RX ADMIN — HYDROMORPHONE HYDROCHLORIDE 0.3 MG: 1 INJECTION, SOLUTION INTRAMUSCULAR; INTRAVENOUS; SUBCUTANEOUS at 05:18

## 2023-06-13 RX ADMIN — HYDRALAZINE HYDROCHLORIDE 20 MG: 20 INJECTION INTRAMUSCULAR; INTRAVENOUS at 13:45

## 2023-06-13 RX ADMIN — OXYCODONE HYDROCHLORIDE 5 MG: 5 TABLET ORAL at 02:05

## 2023-06-13 RX ADMIN — HYDROXYZINE HYDROCHLORIDE 10 MG: 10 TABLET ORAL at 20:05

## 2023-06-13 RX ADMIN — OXYCODONE HYDROCHLORIDE 10 MG: 5 TABLET ORAL at 20:05

## 2023-06-13 RX ADMIN — LEVOTHYROXINE SODIUM 137 MCG: 137 TABLET ORAL at 10:09

## 2023-06-13 RX ADMIN — DULOXETINE HYDROCHLORIDE 20 MG: 20 CAPSULE, DELAYED RELEASE ORAL at 21:14

## 2023-06-13 RX ADMIN — OXYCODONE HYDROCHLORIDE 5 MG: 5 TABLET ORAL at 13:46

## 2023-06-13 RX ADMIN — ACETAMINOPHEN 975 MG: 325 TABLET ORAL at 13:45

## 2023-06-13 RX ADMIN — ROSUVASTATIN CALCIUM 10 MG: 10 TABLET, FILM COATED ORAL at 20:05

## 2023-06-13 RX ADMIN — ACETAMINOPHEN 975 MG: 325 TABLET ORAL at 21:14

## 2023-06-13 RX ADMIN — METOPROLOL SUCCINATE 50 MG: 50 TABLET, EXTENDED RELEASE ORAL at 20:04

## 2023-06-13 RX ADMIN — LIDOCAINE 1 PATCH: 560 PATCH PERCUTANEOUS; TOPICAL; TRANSDERMAL at 20:05

## 2023-06-13 ASSESSMENT — ACTIVITIES OF DAILY LIVING (ADL)
ADLS_ACUITY_SCORE: 20

## 2023-06-13 NOTE — PLAN OF CARE
Goal Outcome Evaluation:       Summary: mechanical fall. Right hip fracture and SAH  Orientation:A&O x4  Vitals/Tele: VSS on nasal cannula 2 L  pt wear oxygen at home during night   Pain management: oxycodone, hydromorphone  IV Access/drains: PIV SL  Diet: NPO  Mobility: Bedrest  GI/: Urinal at bedside, No BM  Wound/Skin: bruise right side of forehead and around Right eyes, Bilateral bruise upper extremity     Consults:  Discharge Plan: Pending  Other: possibly surgery today , hold anticoagulant

## 2023-06-13 NOTE — CONSULTS
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 - 5.3 mmol/L    Chloride 102 98 - 107 mmol/L    Carbon Dioxide (CO2) 28 22 - 29 mmol/L    Anion Gap 10 7 - 15 mmol/L    Urea Nitrogen 13.3 8.0 - 23.0 mg/dL    Creatinine 0.90 0.67 - 1.17 mg/dL    Calcium 9.0 8.8 - 10.2 mg/dL    Glucose 90 70 - 99 mg/dL    GFR Estimate 89 >60 mL/min/1.73m2   Vitamin D Deficiency     Status: Normal   Result Value Ref Range    Vitamin D, Total (25-Hydroxy) 49 20 - 75 ug/L    Narrative    Season, race, dietary intake, and treatment affect the concentration of 25-hydroxy-Vitamin D. Values may decrease during winter months and increase during summer months. Values 20-29 ug/L may indicate Vitamin D insufficiency and values <20 ug/L may indicate Vitamin D deficiency.    Vitamin D determination is routinely performed by an immunoassay specific for 25 hydroxyvitamin D3.  If an individual is on vitamin D2(ergocalciferol) supplementation, please specify 25 OH vitamin D2 and D3 level determination by LCMSMS test VITD23.     Glucose by meter     Status: Normal   Result Value Ref Range    GLUCOSE BY METER POCT 91 70 - 99 mg/dL    INR     Status: Normal   Result Value Ref Range    INR 1.09 0.85 - 1.15   Basic metabolic panel     Status: Abnormal   Result Value Ref Range    Sodium 137 136 - 145 mmol/L    Potassium 4.2 3.4 - 5.3 mmol/L    Chloride 102 98 - 107 mmol/L    Carbon Dioxide (CO2) 25 22 - 29 mmol/L    Anion Gap 10 7 - 15 mmol/L    Urea Nitrogen 11.9 8.0 - 23.0 mg/dL    Creatinine 0.73 0.67 - 1.17 mg/dL    Calcium 8.9 8.8 - 10.2 mg/dL    Glucose 101 (H) 70 - 99 mg/dL    GFR Estimate >90 >60 mL/min/1.73m2   Hemoglobin and hematocrit     Status: Normal   Result Value Ref Range    Hemoglobin 13.5 13.3 - 17.7 g/dL    Hematocrit 41.0 40.0 - 53.0 %   Glucose by meter     Status: Normal   Result Value Ref Range    GLUCOSE BY METER POCT 70 70 - 99 mg/dL   EKG 12 lead     Status: None   Result Value Ref Range    Systolic Blood Pressure  mmHg    Diastolic Blood Pressure  mmHg    Ventricular Rate 58 BPM    Atrial Rate 58 BPM    AZ Interval 188 ms    QRS Duration 74 ms     ms    QTc 435 ms    P Axis 17 degrees    R AXIS 26 degrees    T Axis 43 degrees    Interpretation ECG       Sinus bradycardia  Otherwise normal ECG  When compared with ECG of 15-GERONIMO-2023 03:21,  Premature ventricular complexes are no longer Present  Vent. rate has decreased BY  68 BPM  Confirmed by GENERATED REPORT, COMPUTER ([id number]),  Phylicia Nava ([id number]) on 6/12/2023 1:25:24 PM     Adult Type and Screen     Status: None   Result Value Ref Range    ABO/RH(D) O POS     Antibody Screen Negative Negative    SPECIMEN EXPIRATION DATE [id number]    ABO/Rh type and screen     Status: None    Narrative    The following orders were created for panel order ABO/Rh type and screen.  Procedure                               Abnormality         Status                     ---------                               -----------         ------                     Adult Type and Screen[[id number]]                            Final result                 Please view results for  these tests on the individual orders.   ABO/Rh type and screen *Canceled*     Status: None ()    Narrative    The following orders were created for panel order ABO/Rh type and screen.  Procedure                               Abnormality         Status                     ---------                               -----------         ------                       Please view results for these tests on the individual orders.          Attestation:  I have reviewed today's vital signs, notes, medications, labs and imaging with Dr. Alexandro Lazaro.  Amount of time performed on this consult: 50 minutes.    Priscilla Ulloa PA-C  [facility]

## 2023-06-13 NOTE — TREATMENT PLAN
Updated Orthopedic Treatment Plan    Previously, the patient was tentatively scheduled for a right LIZABETH for management of his right femoral neck fracture. However, per chart review, patient had an interval increase in size of his SAH as well as possible area of parenchymal bleed. Neurosurgery plans on repeating a head CT again today at approximately 1200. Due to slightly enlarging bleed and OR availability, patient's surgery will be delayed to 6/14/23 pending medical optimization by hospital team and neurosurgery at that time. Recommendations as follows:    -Okay for diet today from an orthopedic standpoint. Will defer to medicine team for orders in case there is any other medical reason he should be NPO today. NPO at midnight.  -NWB/bedrest until postop.  -Continue pain regimen.  -No PTA anticoagulation per ED provider and chart review.  -Type and screen completed on 6/12/23.  -Vitamin D level and supplementation previously ordered.    Will need to confer with neurosurgery team tomorrow about clearance for surgery as well as recommendations for postoperative VTE prophylaxis. From an orthopedic standpoint, patient will be able to mobilize/WBAT following the procedure, however he should be on some form of prophylaxis against VTE. Per hospitalist note, patient may be candidate for IVC filter if unable to utilize VTE pharmacoprophylaxis.     I will meet with the patient later this morning to discuss updates in plan.    Elvira Ulloa PA-C  [facility]

## 2023-06-13 NOTE — PROGRESS NOTES
Neurosurgery progress note    Patient denies any new symptoms today.  His surgery was delayed until tomorrow.    Exam    Alert and oriented no acute distress  Moving all extremities  Finger-nose slow and accurate  Negative pronator drift  Extraocular movements intact  Pupils equal and reactive        Imaging    IMPRESSION:  1. No significant interval change in an acute intraparenchymal  hematoma centered in the right middle frontal gyrus compared to the  prior exam, with a small amount of adjacent right frontal subarachnoid  hemorrhage. No significant mass effect/herniation.  2. No new intracranial hemorrhage.  3. Brain atrophy and presumed chronic small vessel ischemic change, as  described.  4. Right periorbital superficial soft tissue swelling.  5. Unchanged nasal arch fracture deformity.  6. Asymmetric anterior subluxation across the right temporomandibular  joint is nonspecific. Recommend clinical correlation.      Assessment    S/p fall  Subarachnoid hemorrhage, right frontal lobe, stable   Right femoral neck fracture    Plan    Okay to initiate VTE prophylaxis on 6/15/2023.    Recommend follow-up head CT scan in neurosurgery clinic in 1 month.  Our clinic will contact the patient to arrange this.    Neurosurgery will sign off    Discussed with Dr. Smalls

## 2023-06-13 NOTE — PROGRESS NOTES
[facility]    Hospitalist Progress Note    Interval History     Patient awake and alert.  No acute events overnight.  Significant discomfort with any movement involving the right leg.  Has a large bruise around his  right eye.  Tells me that he has been having increasing falls over the last few months.    -Data reviewed today: I reviewed all new labs and imaging results over the last 24 hours. I personally reviewed the chest x-ray image(s) showing Multiple rib fractures.    Physical Exam   Temp: 97.9  F (36.6  C) Temp src: Oral BP: 135/55 Pulse: 60   Resp: 16 SpO2: 92 % O2 Device: Nasal cannula Oxygen Delivery: 2 LPM  Vitals:    06/12/23 1240 06/13/23 0714   Weight: 74.8 kg (165 lb) (!) 169.4 kg (373 lb 7.4 oz)     Vital Signs with Ranges  Temp:  [97.9  F (36.6  C)-99  F (37.2  C)] 97.9  F (36.6  C)  Pulse:  [60-75] 60  Resp:  [15-16] 16  BP: (135-148)/(55-89) 135/55  SpO2:  [91 %-97 %] 92 %  I/O last 3 completed shifts:  In: 200 [P.O.:200]  Out: 900 [Urine:900]    Physical Exam  Constitutional:       Appearance: He is ill-appearing.   HENT:      Head: Normocephalic.   Eyes:      Pupils: Pupils are equal, round, and reactive to light.   Cardiovascular:      Rate and Rhythm: Normal rate and regular rhythm.      Pulses: Normal pulses.      Heart sounds: Normal heart sounds.   Pulmonary:      Effort: Pulmonary effort is normal. No respiratory distress.      Breath sounds: Normal breath sounds.   Abdominal:      General: Abdomen is flat. Bowel sounds are normal. There is no distension.      Tenderness: There is no abdominal tenderness. There is no guarding.   Musculoskeletal:      Cervical back: Normal range of motion.      Comments: Right hip and leg significantly painful.  Barely able to move it without pain.  Tenderness over right anterior chest wall   Skin:     General: Skin is warm and dry.      Comments: Bruising around the right eye.  Sutures about the right eye with dressing in place    Neurological:      General: No focal deficit present.           Medications       acetaminophen  975 mg Oral Q8H     amLODIPine  5 mg Oral At Bedtime     azithromycin  500 mg Oral Every Other Day     calcium carbonate  600 mg Oral Daily     ceFAZolin  2 g Intravenous Pre-Op/Pre-procedure x 1 dose     ceFAZolin  2 g Intravenous See Admin Instructions     docusate sodium  100 mg Oral BID     DULoxetine  20 mg Oral At Bedtime     Fluticasone-Umeclidin-Vilant  1 puff Inhalation Daily     levothyroxine  137 mcg Oral Daily     metoprolol succinate ER  50 mg Oral QPM     multivitamin w/minerals  1 tablet Oral Daily     predniSONE  2 mg Oral Daily     [START ON 6/14/2023] ropivacaine (NAROPIN) 5 MG/ mg, EPINEPHrine (ADRENALIN) 0.6 mg in sodium chloride 0.9 % 100 mL (ORTHO GREGORIO CUSTOM DOSE)   INTRA-ARTICULAR On Call to OR     rosuvastatin  10 mg Oral QPM     sodium chloride (PF)  3 mL Intracatheter Q8H     Vitamin D3  25 mcg Oral Daily     cholecalciferol  50 mcg Oral Daily       Data   Recent Labs   Lab 06/13/23  0654 06/13/23  0625 06/13/23  0220 06/12/23  1407   WBC  --   --   --  10.1   HGB 13.5  --   --  14.0   MCV  --   --   --  91   PLT  --   --   --  194   INR 1.09  --   --   --      --   --  140   POTASSIUM 4.2  --   --  4.3   CHLORIDE 102  --   --  102   CO2 25  --   --  28   BUN 11.9  --   --  13.3   CR 0.73  --   --  0.90   ANIONGAP 10  --   --  10   KARLIE 8.9  --   --  9.0   * 70 91 90       Recent Results (from the past 24 hour(s))   CT Head w/o Contrast    Narrative    EXAM: CT HEAD W/O CONTRAST  LOCATION: [facility]  DATE/TIME: 6/12/2023 7:49 PM CDT    INDICATION: SAH s p fall  COMPARISON 06/12/2023.: 06/06/2023  TECHNIQUE: Routine CT Head without IV contrast. Multiplanar reformats. Dose reduction techniques were used.    FINDINGS:  INTRACRANIAL CONTENTS: Small volume subarachnoid hemorrhage is noted about the right frontal lobe, mildly progressed. No new or  worsening intracranial hemorrhage elsewhere. No mass effect or midline shift. No CT evidence of acute infarct. Mild presumed   chronic small vessel ischemic changes. Mild to moderate generalized volume loss. No hydrocephalus.     VISUALIZED ORBITS/SINUSES/MASTOIDS: Right periorbital soft tissue hematoma. Calvarium is intact. Partial opacification of the left maxillary sinus. No middle ear or mastoid effusion.    BONES/SOFT TISSUES: No acute abnormality.      Impression    IMPRESSION:  1.  Small volume subarachnoid hemorrhage about the right frontal lobe, mildly increased.  2.  Brain atrophy and presumed chronic microvascular ischemic changes as above.       CT Head w/o Contrast   Result Value    Radiologist flags Increased hemorrhage. (AA)    Narrative    EXAM: CT HEAD W/O CONTRAST  LOCATION: [facility]  DATE/TIME: 6/13/2023 1:34 AM CDT    INDICATION: SAH, increased on repeat.  COMPARISON: 6/12/2023 at 1934  TECHNIQUE: Routine CT Head without IV contrast. Multiplanar reformats. Dose reduction techniques were used.    FINDINGS:  INTRACRANIAL CONTENTS: Small volume acute subarachnoid hemorrhage in the right frontal lobe has mildly increased. Question small amount of adjacent parenchymal hemorrhage could be newly visualized. No new mass effect or hemorrhage elsewhere. Volume loss   and presumed chronic small vessel ischemia are stable. No definite CT evidence of acute infarct.    VISUALIZED ORBITS/SINUSES/MASTOIDS: No intraorbital abnormality. No paranasal sinus mucosal disease. No middle ear or mastoid effusion.    BONES/SOFT TISSUES: No acute abnormality.      Impression    IMPRESSION:  1.  Mild increase in small volume acute subarachnoid hemorrhage in the right frontal lobe. Question newly visualized small amount of parenchymal hemorrhage in the adjacent/underlying right frontal lobe. Continued follow-up suggested.    2.  No additional hemorrhage or mass effect.    [Critical Result:  Increased hemorrhage.    Finding was identified on 6/13/2023 1:47 AM CDT.     Dr. Logan was contacted by me on 6/13/2023 1:52 AM CDT and verbalized understanding of the critical result.    CT Head w/o Contrast    Narrative    CT SCAN OF THE HEAD WITHOUT CONTRAST   6/13/2023 12:22 PM     HISTORY: Follow up SAH, parenchymal hemorrhage.    TECHNIQUE:  Axial images of the head and coronal reformations without  IV contrast material. Radiation dose for this scan was reduced using  automated exposure control, adjustment of the mA and/or kV according  to patient size, or iterative reconstruction technique.    COMPARISON: CT of the head 6/13/2023.    FINDINGS: No significant interval change in the acute intraparenchymal  hematoma centered in the right middle frontal gyrus compared to the  prior study measuring approximately 13 mm x 13 mm x 19 mm. There is  again mild surrounding vasogenic edema. There is a small amount of  adjacent acute subarachnoid hemorrhage in the right frontal sulci.    No other intracranial hemorrhage is identified. There is moderate  generalized brain parenchymal volume loss. Mild scattered patchy  nonspecific foci of hypoattenuation in the cerebral white matter,  likely due to chronic small vessel ischemic disease. The ventricles  appear normal in size and configuration.    Bilateral lens implants. Fracture deformity of the nasal arch, as  before. Moderate polypoid soft tissue in the left maxillary sinus and  mild to moderate scattered polypoid mucosal thickening elsewhere in  the paranasal sinuses. The mastoid and middle ear cavities appear  clear. Relative anterior subluxation of the right mandibular condyle  with respect to the ipsilateral glenoid fossa, possibly positional  and/or related to advanced degenerative changes. A posttraumatic cause  is not completely excluded. Recommend clinical correlation. The  mastoid and middle ear cavities are clear. The calvarium appears  intact.       Impression    IMPRESSION:  1. No significant interval change in an acute intraparenchymal  hematoma centered in the right middle frontal gyrus compared to the  prior exam, with a small amount of adjacent right frontal subarachnoid  hemorrhage. No significant mass effect/herniation.  2. No new intracranial hemorrhage.  3. Brain atrophy and presumed chronic small vessel ischemic change, as  described.  4. Right periorbital superficial soft tissue swelling.  5. Unchanged nasal arch fracture deformity.  6. Asymmetric anterior subluxation across the right temporomandibular  joint is nonspecific. Recommend clinical correlation.    RANDELL NIXON MD         SYSTEM ID:  [id number]   XR Ribs Bilateral 2 Views    Narrative    XR RIBS BILATERAL 2 VIEWS 6/13/2023 1:14 PM     HISTORY: Anterior chest wall pain.    COMPARISON: Chest radiograph 6/12/2023.       Impression    IMPRESSION:    Mildly displaced left lateral/anterolateral sixth through ninth rib  fractures. No pneumothorax. Redemonstrated opacity in the right  midlung which is slightly more confluent compared to prior chest  radiographs. Recommended follow-up to ensure resolution.     NOTE: ABNORMAL REPORT    THE DICTATION ABOVE DESCRIBES AN ABNORMAL REPORT FOR WHICH FOLLOW-UP  IS NEEDED.    SLOAN LEE MD         SYSTEM ID:  [id number]         Assessment & Plan      Harrison Thomas is a 77 year old male with medical history significant for COPD, HTN, HL, hypothyroidism was brought to the ER for evaluation after a fall and is admitted on 6/12/2023 for management of traumatic SAH and right femoral neck fracture.      Traumatic small volume SAH  Skin tear forehead  CT head showed small volume subarachnoid hemorrhage at the anterolateral right frontal lobe and scalp hematoma.  -Neurosurgery consulted from ER, head CT repeated at 1:34 AM on 6/13 showed mild increase in acute subarachnoid hemorrhage in the right frontal lobe  -Head CT was again repeated 12:22 PM on  6/13/2023.  No significant interval change in acute intraparenchymal hematoma in the right middle frontal gyrus compared to previous exam with small adjacent right frontal subarachnoid hemorrhage being stable.  -At this point neurosurgery plans for follow-up head CT in clinic in 1 month.  -Per neurosurgery can initiate VTE prophylaxis on 6/15/2023  -BP control, RN hydralazine ordered for SBP >140  -Hold aspirin  -Neuro check every 4 hours  -Telemetry  -Fall and seizure precaution, defer seizure ppx to neurosurgery.  -skin tear Rt supraorbital area sutured with absorbable sutures.     Right femoral neck fracture  - Rt should XR as well given pain  - Pain control-scheduled Tylenol and gabapentin, as needed oxycodone and IV Dilaudid.  - Orthopedic surgery consulted, needs surgery but risk of DVT post op as he will not be able to resume prophylactic AC. Defer elective IVC filter to ortho if deemed high risk post op unless cleared for AC by neurosurgery.   -RCRI -1 risk factor given CAD but no active anginal symptoms. No further work up recommended prior to surgery and Overall 1% risk of correct of cardiac death, nonfatal MI and nonfatal cardiac arrest. Given SAH, further risk stratification based on recommendation from neurosurgery as well.  -PT OT when able (need to order)  - consult for discharge planning   - plan for surgery tomorrow  - per neurosurgey dvt prophylaxis can be started from 6/15     Mechanical fall   right-sided rib 6-9 mildly displaced fractures  Reports his Left leg natalie up sometime. Was admitted in hospital 2 months ago for fall and Rib fracture. Fell 3 weeks ago as well but no new injuries sustained.  - xray rib fx protocol done today showed Mildly displaced left lateral/anterolateral sixth through ninth rib  fractures. No pneumothorax  - PT eval post op  - fall precautions  - rib fracture protocol ordered  -Vital capacity/NIF ordered  -On Tylenol, oxycodone, Robaxin, lidocaine and for pain  "management  -Incentive spirometry encouraged  -We will monitor for respiratory decompensation.  Patient currently on nasal cannula 2 L.  - trauma surgery consulted      COPD with chronic hypoxemic resp failure, on 1LPM NC O2  BRENNEN, non complainat with CPAP  RUL mass, s/p radiation   -continue PTA inhalers  -CPAP ordered while here, discussed with patient to use  -on chronic prednisone and azithromycin, continue. Monitor for BP perioperatively closely. Stress dose steroid if needed.  -He follows up with Dr. Dumont and Pulmonology for probable lung cancer with possible radiation pneumonitis. He has been noted with RUL mass like lesion (PET positive). He underwent VATS biopsy (02/2022) by Dr Dumont but was noted to be incomplete due to a lot of scar tissue. He subsequently underwent radiation therapy for enlarging right upper lobe density.      HTN, HL  CAD  Circumflex stent in 1997, angiogram in 2005 showed moderate LAD and diagonal disease on medical management.  No anginal symptoms reported currently.  -resume PTA regimen including Norvasc, Toprol-XL and rosuvastatin.  -Hold aspirin given SAH     Hypothyroidism  History of papillary thyroid cancer s/p thyroidectomy  Resume PTA Synthroid           Diet: diet advanced . Npo after midnight   DVT Prophylaxis: Pneumatic Compression Devices  Mir Catheter: Not present  Lines: None     Cardiac Monitoring: None  Code Status:  DNR/DNI    Clinically Significant Risk Factors                  # Hypertension: Noted on problem list        # Severe Obesity: Estimated body mass index is 53.59 kg/m  as calculated from the following:    Height as of this encounter: 1.778 m (5' 10\").    Weight as of this encounter: 169.4 kg (373 lb 7.4 oz)., PRESENT ON ADMISSION             Elaina Quintero MD, MD  [phone](p)  "

## 2023-06-13 NOTE — PROGRESS NOTES
Cross cover    Called that repeat head CT done tonight showed Mild increase in small volume acute subarachnoid hemorrhage in the right frontal lobe. Question newly visualized small amount of parenchymal hemorrhage in the adjacent/underlying right frontal lobe. Continued follow-up suggested. No additional hemorrhage or mass effect.    Discussed with RN, no new symptoms. Patient resting comfortably. Unclear why this CT done tonight rather than 0700 as ordered.     Discussed with neurosurgery JAY. No change to plan for tonight. NS rec repeat head CT at noon which has been ordered.

## 2023-06-13 NOTE — PROVIDER NOTIFICATION
MD Notification    Notified Person: MD    Notified Person Name: Dilip MoralesSUNDEEP neurosurgery    Notification Date/Time: 6/13/23 0930    Notification Interaction: text message    Purpose of Notification: pt NPO status    Orders Received: pt does not need to be NPO per neurosurgery.     Comments:

## 2023-06-13 NOTE — PROVIDER NOTIFICATION
MD Notification    Notified Person: MD    Notified Person Name: Dr. Quintero, hospitalist    Notification Date/Time: 6/13/23 0453    Notification Interaction: text message    Purpose of Notification: pt diet order, currently NPO    Orders Received:    Comments:

## 2023-06-14 ENCOUNTER — APPOINTMENT (OUTPATIENT)
Dept: GENERAL RADIOLOGY | Facility: CLINIC | Age: 78
DRG: 956 | End: 2023-06-14
Attending: HOSPITALIST
Payer: MEDICARE

## 2023-06-14 ENCOUNTER — ANESTHESIA (OUTPATIENT)
Dept: SURGERY | Facility: CLINIC | Age: 78
DRG: 956 | End: 2023-06-14
Payer: MEDICARE

## 2023-06-14 ENCOUNTER — APPOINTMENT (OUTPATIENT)
Dept: GENERAL RADIOLOGY | Facility: CLINIC | Age: 78
DRG: 956 | End: 2023-06-14
Attending: PHYSICIAN ASSISTANT
Payer: MEDICARE

## 2023-06-14 ENCOUNTER — ANESTHESIA EVENT (OUTPATIENT)
Dept: SURGERY | Facility: CLINIC | Age: 78
DRG: 956 | End: 2023-06-14
Payer: MEDICARE

## 2023-06-14 LAB
ANION GAP SERPL CALCULATED.3IONS-SCNC: 10 MMOL/L (ref 7–15)
BUN SERPL-MCNC: 12.9 MG/DL (ref 8–23)
CALCIUM SERPL-MCNC: 8.8 MG/DL (ref 8.8–10.2)
CHLORIDE SERPL-SCNC: 102 MMOL/L (ref 98–107)
CREAT SERPL-MCNC: 0.82 MG/DL (ref 0.67–1.17)
DEPRECATED HCO3 PLAS-SCNC: 26 MMOL/L (ref 22–29)
GFR SERPL CREATININE-BSD FRML MDRD: >90 ML/MIN/1.73M2
GLUCOSE BLDC GLUCOMTR-MCNC: 251 MG/DL (ref 70–99)
GLUCOSE BLDC GLUCOMTR-MCNC: 317 MG/DL (ref 70–99)
GLUCOSE SERPL-MCNC: 88 MG/DL (ref 70–99)
MAGNESIUM SERPL-MCNC: 2 MG/DL (ref 1.7–2.3)
PHOSPHATE SERPL-MCNC: 3.1 MG/DL (ref 2.5–4.5)
POTASSIUM SERPL-SCNC: 3.7 MMOL/L (ref 3.4–5.3)
SODIUM SERPL-SCNC: 138 MMOL/L (ref 136–145)

## 2023-06-14 PROCEDURE — 99232 SBSQ HOSP IP/OBS MODERATE 35: CPT | Performed by: HOSPITALIST

## 2023-06-14 PROCEDURE — 0SR9049 REPLACEMENT OF RIGHT HIP JOINT WITH CERAMIC ON POLYETHYLENE SYNTHETIC SUBSTITUTE, CEMENTED, OPEN APPROACH: ICD-10-PCS | Performed by: ORTHOPAEDIC SURGERY

## 2023-06-14 PROCEDURE — 258N000003 HC RX IP 258 OP 636: Performed by: NURSE ANESTHETIST, CERTIFIED REGISTERED

## 2023-06-14 PROCEDURE — 250N000011 HC RX IP 250 OP 636: Performed by: PHYSICIAN ASSISTANT

## 2023-06-14 PROCEDURE — 250N000012 HC RX MED GY IP 250 OP 636 PS 637: Performed by: HOSPITALIST

## 2023-06-14 PROCEDURE — 360N000077 HC SURGERY LEVEL 4, PER MIN: Performed by: ORTHOPAEDIC SURGERY

## 2023-06-14 PROCEDURE — 36415 COLL VENOUS BLD VENIPUNCTURE: CPT | Performed by: HOSPITALIST

## 2023-06-14 PROCEDURE — 71045 X-RAY EXAM CHEST 1 VIEW: CPT

## 2023-06-14 PROCEDURE — 250N000011 HC RX IP 250 OP 636: Performed by: NURSE ANESTHETIST, CERTIFIED REGISTERED

## 2023-06-14 PROCEDURE — 710N000009 HC RECOVERY PHASE 1, LEVEL 1, PER MIN: Performed by: ORTHOPAEDIC SURGERY

## 2023-06-14 PROCEDURE — 250N000013 HC RX MED GY IP 250 OP 250 PS 637: Performed by: PHYSICIAN ASSISTANT

## 2023-06-14 PROCEDURE — 82310 ASSAY OF CALCIUM: CPT | Performed by: HOSPITALIST

## 2023-06-14 PROCEDURE — 999N000141 HC STATISTIC PRE-PROCEDURE NURSING ASSESSMENT: Performed by: ORTHOPAEDIC SURGERY

## 2023-06-14 PROCEDURE — 250N000013 HC RX MED GY IP 250 OP 250 PS 637: Performed by: HOSPITALIST

## 2023-06-14 PROCEDURE — 999N000157 HC STATISTIC RCP TIME EA 10 MIN

## 2023-06-14 PROCEDURE — 370N000017 HC ANESTHESIA TECHNICAL FEE, PER MIN: Performed by: ORTHOPAEDIC SURGERY

## 2023-06-14 PROCEDURE — 120N000001 HC R&B MED SURG/OB

## 2023-06-14 PROCEDURE — 250N000009 HC RX 250: Performed by: ORTHOPAEDIC SURGERY

## 2023-06-14 PROCEDURE — 94150 VITAL CAPACITY TEST: CPT

## 2023-06-14 PROCEDURE — 250N000013 HC RX MED GY IP 250 OP 250 PS 637: Performed by: ANESTHESIOLOGY

## 2023-06-14 PROCEDURE — 250N000011 HC RX IP 250 OP 636: Performed by: HOSPITALIST

## 2023-06-14 PROCEDURE — C1776 JOINT DEVICE (IMPLANTABLE): HCPCS | Performed by: ORTHOPAEDIC SURGERY

## 2023-06-14 PROCEDURE — 83735 ASSAY OF MAGNESIUM: CPT | Performed by: HOSPITALIST

## 2023-06-14 PROCEDURE — 250N000011 HC RX IP 250 OP 636: Performed by: ORTHOPAEDIC SURGERY

## 2023-06-14 PROCEDURE — 250N000025 HC SEVOFLURANE, PER MIN: Performed by: ORTHOPAEDIC SURGERY

## 2023-06-14 PROCEDURE — 999N000063 XR PELVIS AND HIP PORTABLE RIGHT 1 VIEW

## 2023-06-14 PROCEDURE — 272N000001 HC OR GENERAL SUPPLY STERILE: Performed by: ORTHOPAEDIC SURGERY

## 2023-06-14 PROCEDURE — 250N000009 HC RX 250: Performed by: NURSE ANESTHETIST, CERTIFIED REGISTERED

## 2023-06-14 PROCEDURE — 999N000179 XR SURGERY CARM FLUORO LESS THAN 5 MIN W STILLS

## 2023-06-14 PROCEDURE — 258N000003 HC RX IP 258 OP 636: Performed by: HOSPITALIST

## 2023-06-14 PROCEDURE — 84100 ASSAY OF PHOSPHORUS: CPT | Performed by: HOSPITALIST

## 2023-06-14 PROCEDURE — 258N000003 HC RX IP 258 OP 636: Performed by: ANESTHESIOLOGY

## 2023-06-14 DEVICE — CLUSTER ACETABULAR SHELL
Type: IMPLANTABLE DEVICE | Site: HIP | Status: FUNCTIONAL
Brand: TRIDENT

## 2023-06-14 DEVICE — TRIDENT X3 0 DEGREE POLYETHYLENE INSERT
Type: IMPLANTABLE DEVICE | Site: HIP | Status: FUNCTIONAL
Brand: TRIDENT X3 INSERT

## 2023-06-14 DEVICE — CERAMIC V40 FEMORAL HEAD
Type: IMPLANTABLE DEVICE | Site: HIP | Status: FUNCTIONAL
Brand: BIOLOX

## 2023-06-14 DEVICE — HIP STEM - HIGH OFFSET
Type: IMPLANTABLE DEVICE | Site: HIP | Status: FUNCTIONAL
Brand: INSIGNIA

## 2023-06-14 DEVICE — CANCELLOUS BONE SCREW
Type: IMPLANTABLE DEVICE | Site: HIP | Status: FUNCTIONAL
Brand: TORX

## 2023-06-14 RX ORDER — FENTANYL CITRATE 0.05 MG/ML
25 INJECTION, SOLUTION INTRAMUSCULAR; INTRAVENOUS EVERY 5 MIN PRN
Status: DISCONTINUED | OUTPATIENT
Start: 2023-06-14 | End: 2023-06-14 | Stop reason: HOSPADM

## 2023-06-14 RX ORDER — CEFAZOLIN SODIUM 1 G/3ML
1 INJECTION, POWDER, FOR SOLUTION INTRAMUSCULAR; INTRAVENOUS EVERY 8 HOURS
Status: COMPLETED | OUTPATIENT
Start: 2023-06-14 | End: 2023-06-15

## 2023-06-14 RX ORDER — TRANEXAMIC ACID 100 MG/ML
INJECTION, SOLUTION INTRAVENOUS PRN
Status: DISCONTINUED | OUTPATIENT
Start: 2023-06-14 | End: 2023-06-14 | Stop reason: HOSPADM

## 2023-06-14 RX ORDER — LABETALOL HYDROCHLORIDE 5 MG/ML
10 INJECTION, SOLUTION INTRAVENOUS
Status: DISCONTINUED | OUTPATIENT
Start: 2023-06-14 | End: 2023-06-14 | Stop reason: HOSPADM

## 2023-06-14 RX ORDER — ONDANSETRON 4 MG/1
4 TABLET, ORALLY DISINTEGRATING ORAL EVERY 30 MIN PRN
Status: DISCONTINUED | OUTPATIENT
Start: 2023-06-14 | End: 2023-06-14 | Stop reason: HOSPADM

## 2023-06-14 RX ORDER — MAGNESIUM HYDROXIDE 1200 MG/15ML
LIQUID ORAL PRN
Status: DISCONTINUED | OUTPATIENT
Start: 2023-06-14 | End: 2023-06-14 | Stop reason: HOSPADM

## 2023-06-14 RX ORDER — SODIUM CHLORIDE, SODIUM LACTATE, POTASSIUM CHLORIDE, CALCIUM CHLORIDE 600; 310; 30; 20 MG/100ML; MG/100ML; MG/100ML; MG/100ML
INJECTION, SOLUTION INTRAVENOUS CONTINUOUS
Status: DISCONTINUED | OUTPATIENT
Start: 2023-06-14 | End: 2023-06-14 | Stop reason: HOSPADM

## 2023-06-14 RX ORDER — PROPOFOL 10 MG/ML
INJECTION, EMULSION INTRAVENOUS PRN
Status: DISCONTINUED | OUTPATIENT
Start: 2023-06-14 | End: 2023-06-14

## 2023-06-14 RX ORDER — VANCOMYCIN HYDROCHLORIDE 1 G/20ML
INJECTION, POWDER, LYOPHILIZED, FOR SOLUTION INTRAVENOUS PRN
Status: DISCONTINUED | OUTPATIENT
Start: 2023-06-14 | End: 2023-06-14 | Stop reason: HOSPADM

## 2023-06-14 RX ORDER — HYDRALAZINE HYDROCHLORIDE 20 MG/ML
2.5-5 INJECTION INTRAMUSCULAR; INTRAVENOUS EVERY 10 MIN PRN
Status: DISCONTINUED | OUTPATIENT
Start: 2023-06-14 | End: 2023-06-14 | Stop reason: HOSPADM

## 2023-06-14 RX ORDER — FENTANYL CITRATE 0.05 MG/ML
50 INJECTION, SOLUTION INTRAMUSCULAR; INTRAVENOUS EVERY 5 MIN PRN
Status: DISCONTINUED | OUTPATIENT
Start: 2023-06-14 | End: 2023-06-14 | Stop reason: HOSPADM

## 2023-06-14 RX ORDER — ONDANSETRON 2 MG/ML
4 INJECTION INTRAMUSCULAR; INTRAVENOUS EVERY 30 MIN PRN
Status: DISCONTINUED | OUTPATIENT
Start: 2023-06-14 | End: 2023-06-14 | Stop reason: HOSPADM

## 2023-06-14 RX ORDER — LIDOCAINE HYDROCHLORIDE 20 MG/ML
INJECTION, SOLUTION INFILTRATION; PERINEURAL PRN
Status: DISCONTINUED | OUTPATIENT
Start: 2023-06-14 | End: 2023-06-14

## 2023-06-14 RX ORDER — EPHEDRINE SULFATE 50 MG/ML
INJECTION, SOLUTION INTRAMUSCULAR; INTRAVENOUS; SUBCUTANEOUS PRN
Status: DISCONTINUED | OUTPATIENT
Start: 2023-06-14 | End: 2023-06-14

## 2023-06-14 RX ORDER — DIMENHYDRINATE 50 MG/ML
25 INJECTION, SOLUTION INTRAMUSCULAR; INTRAVENOUS
Status: DISCONTINUED | OUTPATIENT
Start: 2023-06-14 | End: 2023-06-14 | Stop reason: HOSPADM

## 2023-06-14 RX ORDER — ASPIRIN 325 MG
325 TABLET, DELAYED RELEASE (ENTERIC COATED) ORAL DAILY
Status: DISCONTINUED | OUTPATIENT
Start: 2023-06-14 | End: 2023-06-17 | Stop reason: HOSPADM

## 2023-06-14 RX ORDER — HYDROMORPHONE HCL IN WATER/PF 6 MG/30 ML
0.4 PATIENT CONTROLLED ANALGESIA SYRINGE INTRAVENOUS EVERY 5 MIN PRN
Status: DISCONTINUED | OUTPATIENT
Start: 2023-06-14 | End: 2023-06-14 | Stop reason: HOSPADM

## 2023-06-14 RX ORDER — FENTANYL CITRATE 50 UG/ML
INJECTION, SOLUTION INTRAMUSCULAR; INTRAVENOUS PRN
Status: DISCONTINUED | OUTPATIENT
Start: 2023-06-14 | End: 2023-06-14

## 2023-06-14 RX ORDER — SODIUM CHLORIDE 9 MG/ML
INJECTION, SOLUTION INTRAVENOUS CONTINUOUS
Status: DISCONTINUED | OUTPATIENT
Start: 2023-06-14 | End: 2023-06-17 | Stop reason: HOSPADM

## 2023-06-14 RX ORDER — HYDROMORPHONE HCL IN WATER/PF 6 MG/30 ML
0.2 PATIENT CONTROLLED ANALGESIA SYRINGE INTRAVENOUS EVERY 5 MIN PRN
Status: DISCONTINUED | OUTPATIENT
Start: 2023-06-14 | End: 2023-06-14 | Stop reason: HOSPADM

## 2023-06-14 RX ORDER — LIDOCAINE 40 MG/G
CREAM TOPICAL
Status: DISCONTINUED | OUTPATIENT
Start: 2023-06-14 | End: 2023-06-17 | Stop reason: HOSPADM

## 2023-06-14 RX ORDER — ACETAMINOPHEN 325 MG/1
975 TABLET ORAL ONCE
Status: COMPLETED | OUTPATIENT
Start: 2023-06-14 | End: 2023-06-14

## 2023-06-14 RX ADMIN — ROCURONIUM BROMIDE 50 MG: 50 INJECTION, SOLUTION INTRAVENOUS at 13:07

## 2023-06-14 RX ADMIN — GABAPENTIN 300 MG: 300 CAPSULE ORAL at 22:24

## 2023-06-14 RX ADMIN — PHENYLEPHRINE HYDROCHLORIDE 100 MCG: 10 INJECTION INTRAVENOUS at 14:09

## 2023-06-14 RX ADMIN — TRANEXAMIC ACID 1 G: 1 INJECTION, SOLUTION INTRAVENOUS at 13:39

## 2023-06-14 RX ADMIN — Medication 10 MG: at 14:23

## 2023-06-14 RX ADMIN — HYDROMORPHONE HYDROCHLORIDE 0.3 MG: 1 INJECTION, SOLUTION INTRAMUSCULAR; INTRAVENOUS; SUBCUTANEOUS at 09:11

## 2023-06-14 RX ADMIN — Medication 25 MCG: at 09:10

## 2023-06-14 RX ADMIN — PROPOFOL 100 MG: 10 INJECTION, EMULSION INTRAVENOUS at 13:07

## 2023-06-14 RX ADMIN — METOPROLOL SUCCINATE 50 MG: 50 TABLET, EXTENDED RELEASE ORAL at 20:58

## 2023-06-14 RX ADMIN — ACETAMINOPHEN 975 MG: 325 TABLET ORAL at 18:59

## 2023-06-14 RX ADMIN — Medication 50 MCG: at 09:10

## 2023-06-14 RX ADMIN — PHENYLEPHRINE HYDROCHLORIDE 100 MCG: 10 INJECTION INTRAVENOUS at 14:54

## 2023-06-14 RX ADMIN — HYDROXYZINE HYDROCHLORIDE 10 MG: 10 TABLET ORAL at 09:11

## 2023-06-14 RX ADMIN — PHENYLEPHRINE HYDROCHLORIDE 100 MCG: 10 INJECTION INTRAVENOUS at 14:48

## 2023-06-14 RX ADMIN — PHENYLEPHRINE HYDROCHLORIDE 100 MCG: 10 INJECTION INTRAVENOUS at 13:14

## 2023-06-14 RX ADMIN — ACETAMINOPHEN 975 MG: 325 TABLET ORAL at 09:08

## 2023-06-14 RX ADMIN — ASPIRIN 325 MG: 325 TABLET, COATED ORAL at 18:59

## 2023-06-14 RX ADMIN — GABAPENTIN 300 MG: 300 CAPSULE ORAL at 09:11

## 2023-06-14 RX ADMIN — HYDROMORPHONE HYDROCHLORIDE 0.3 MG: 1 INJECTION, SOLUTION INTRAMUSCULAR; INTRAVENOUS; SUBCUTANEOUS at 02:21

## 2023-06-14 RX ADMIN — Medication 10 MG: at 14:13

## 2023-06-14 RX ADMIN — SODIUM CHLORIDE, POTASSIUM CHLORIDE, SODIUM LACTATE AND CALCIUM CHLORIDE: 600; 310; 30; 20 INJECTION, SOLUTION INTRAVENOUS at 15:02

## 2023-06-14 RX ADMIN — PHENYLEPHRINE HYDROCHLORIDE 0.5 MCG/KG/MIN: 10 INJECTION INTRAVENOUS at 13:13

## 2023-06-14 RX ADMIN — LEVOTHYROXINE SODIUM 137 MCG: 137 TABLET ORAL at 09:09

## 2023-06-14 RX ADMIN — FENTANYL CITRATE 50 MCG: 50 INJECTION, SOLUTION INTRAMUSCULAR; INTRAVENOUS at 13:38

## 2023-06-14 RX ADMIN — Medication 600 MG: at 09:11

## 2023-06-14 RX ADMIN — LIDOCAINE HYDROCHLORIDE 100 MG: 20 INJECTION, SOLUTION INFILTRATION; PERINEURAL at 13:07

## 2023-06-14 RX ADMIN — HYDROMORPHONE HYDROCHLORIDE 0.3 MG: 1 INJECTION, SOLUTION INTRAMUSCULAR; INTRAVENOUS; SUBCUTANEOUS at 19:04

## 2023-06-14 RX ADMIN — SODIUM CHLORIDE, POTASSIUM CHLORIDE, SODIUM LACTATE AND CALCIUM CHLORIDE: 600; 310; 30; 20 INJECTION, SOLUTION INTRAVENOUS at 11:27

## 2023-06-14 RX ADMIN — DULOXETINE HYDROCHLORIDE 20 MG: 20 CAPSULE, DELAYED RELEASE ORAL at 22:24

## 2023-06-14 RX ADMIN — HYDROMORPHONE HYDROCHLORIDE 0.3 MG: 1 INJECTION, SOLUTION INTRAMUSCULAR; INTRAVENOUS; SUBCUTANEOUS at 22:24

## 2023-06-14 RX ADMIN — CEFAZOLIN SODIUM 2 G: 2 INJECTION, SOLUTION INTRAVENOUS at 12:59

## 2023-06-14 RX ADMIN — LIDOCAINE 1 PATCH: 560 PATCH PERCUTANEOUS; TOPICAL; TRANSDERMAL at 21:03

## 2023-06-14 RX ADMIN — FENTANYL CITRATE 50 MCG: 50 INJECTION, SOLUTION INTRAMUSCULAR; INTRAVENOUS at 13:07

## 2023-06-14 RX ADMIN — SUGAMMADEX 200 MG: 100 INJECTION, SOLUTION INTRAVENOUS at 14:57

## 2023-06-14 RX ADMIN — CEFAZOLIN 1 G: 1 INJECTION, POWDER, FOR SOLUTION INTRAMUSCULAR; INTRAVENOUS at 21:02

## 2023-06-14 RX ADMIN — ROSUVASTATIN CALCIUM 10 MG: 10 TABLET, FILM COATED ORAL at 20:58

## 2023-06-14 RX ADMIN — HYDROMORPHONE HYDROCHLORIDE 0.5 MG: 1 INJECTION, SOLUTION INTRAMUSCULAR; INTRAVENOUS; SUBCUTANEOUS at 13:50

## 2023-06-14 RX ADMIN — AMLODIPINE BESYLATE 5 MG: 5 TABLET ORAL at 22:24

## 2023-06-14 RX ADMIN — PHENYLEPHRINE HYDROCHLORIDE 100 MCG: 10 INJECTION INTRAVENOUS at 14:42

## 2023-06-14 RX ADMIN — HYDROMORPHONE HYDROCHLORIDE 0.5 MG: 1 INJECTION, SOLUTION INTRAMUSCULAR; INTRAVENOUS; SUBCUTANEOUS at 13:56

## 2023-06-14 RX ADMIN — PREDNISONE 2 MG: 1 TABLET ORAL at 09:11

## 2023-06-14 RX ADMIN — Medication 5 MG: at 14:37

## 2023-06-14 ASSESSMENT — ACTIVITIES OF DAILY LIVING (ADL)
ADLS_ACUITY_SCORE: 22
ADLS_ACUITY_SCORE: 20
ADLS_ACUITY_SCORE: 22

## 2023-06-14 ASSESSMENT — COPD QUESTIONNAIRES
CAT_SEVERITY: SEVERE
COPD: 1

## 2023-06-14 ASSESSMENT — LIFESTYLE VARIABLES: TOBACCO_USE: 1

## 2023-06-14 NOTE — PROGRESS NOTES
Patient is A&O x4, bedrest. NPO for surgery.On tele with SR with 1st degree AVB.Neuro intact. Pain manage with IV dilaudid and atarax. Voiding per urinal. Transfer to surgery unit.

## 2023-06-14 NOTE — PROVIDER NOTIFICATION
MD Notification    Notified Person: PA    Notified Person Name: Stevie Nino    Notification Date/Time: 06/14/2023 @ 1587    Notification Interaction: Phone    Purpose of Notification: patient spouse requesting an update with surgery.    Orders Received:    Comments:

## 2023-06-14 NOTE — PROGRESS NOTES
[facility]    Hospitalist Progress Note    Interval History     Patient awake and alert.  No acute events overnight.  Continues to endorse pain with his right hip minimal activity.  Having good bowel and bladder function.  No other complaints.  Does endorse right upper chest wall pain from rib fractures.    -Data reviewed today: I reviewed all new labs and imaging results over the last 24 hours. I personally reviewed the chest x-ray image(s) showing Multiple rib fractures.    Physical Exam   Temp: 99.7  F (37.6  C) Temp src: Oral BP: 132/58 Pulse: 60   Resp: 16 SpO2: 93 % O2 Device: Nasal cannula Oxygen Delivery: 3 LPM  Vitals:    06/12/23 1240 06/13/23 0714 06/14/23 0618   Weight: 74.8 kg (165 lb) (!) 169.4 kg (373 lb 7.4 oz) 78.9 kg (173 lb 15.1 oz)     Vital Signs with Ranges  Temp:  [97.9  F (36.6  C)-99.7  F (37.6  C)] 99.7  F (37.6  C)  Pulse:  [60-87] 60  Resp:  [16-20] 16  BP: (132-143)/(55-64) 132/58  SpO2:  [92 %-94 %] 93 %  I/O last 3 completed shifts:  In: 560 [P.O.:560]  Out: 1300 [Urine:1300]    Physical Exam  Constitutional:       Appearance: He is ill-appearing.   HENT:      Head: Normocephalic.   Eyes:      Pupils: Pupils are equal, round, and reactive to light.   Cardiovascular:      Rate and Rhythm: Normal rate and regular rhythm.      Pulses: Normal pulses.      Heart sounds: Normal heart sounds.   Pulmonary:      Effort: Pulmonary effort is normal. No respiratory distress.      Breath sounds: Normal breath sounds.   Abdominal:      General: Abdomen is flat. Bowel sounds are normal. There is no distension.      Tenderness: There is no abdominal tenderness. There is no guarding.   Musculoskeletal:      Cervical back: Normal range of motion.      Comments: Right hip and leg significantly painful.  Barely able to move it without pain.  Tenderness over right anterior chest wall   Skin:     General: Skin is warm and dry.      Comments: Bruising around the right eye.  Sutures  about the right eye with dressing in place   Neurological:      General: No focal deficit present.           Medications     lactated ringers 10 mL/hr at 06/14/23 1127       [Auto Hold] acetaminophen  975 mg Oral Q8H     [Auto Hold] amLODIPine  5 mg Oral At Bedtime     [Auto Hold] azithromycin  500 mg Oral Every Other Day     [Auto Hold] calcium carbonate  600 mg Oral Daily     ceFAZolin  2 g Intravenous Pre-Op/Pre-procedure x 1 dose     ceFAZolin  2 g Intravenous See Admin Instructions     [Auto Hold] docusate sodium  100 mg Oral BID     [Auto Hold] DULoxetine  20 mg Oral At Bedtime     [Auto Hold] Fluticasone-Umeclidin-Vilant  1 puff Inhalation Daily     [Auto Hold] gabapentin  300 mg Oral TID     [Auto Hold] levothyroxine  137 mcg Oral Daily     [Auto Hold] lidocaine  1 patch Transdermal Q24H     [Auto Hold] lidocaine   Transdermal Q8H [facility]     [Auto Hold] metoprolol succinate ER  50 mg Oral QPM     [Auto Hold] multivitamin w/minerals  1 tablet Oral Daily     [Auto Hold] predniSONE  2 mg Oral Daily     ropivacaine (NAROPIN) 5 MG/ mg, EPINEPHrine (ADRENALIN) 0.6 mg in sodium chloride 0.9 % 100 mL (ORTHO GREGORIO CUSTOM DOSE)   INTRA-ARTICULAR On Call to OR     [Auto Hold] rosuvastatin  10 mg Oral QPM     [Auto Hold] sodium chloride (PF)  3 mL Intracatheter Q8H     [Auto Hold] Vitamin D3  25 mcg Oral Daily     [Auto Hold] cholecalciferol  50 mcg Oral Daily       Data   Recent Labs   Lab 06/14/23  0727 06/13/23  0654 06/13/23  0625 06/13/23  0220 06/12/23  1407   WBC  --   --   --   --  10.1   HGB  --  13.5  --   --  14.0   MCV  --   --   --   --  91   PLT  --   --   --   --  194   INR  --  1.09  --   --   --     137  --   --  140   POTASSIUM 3.7 4.2  --   --  4.3   CHLORIDE 102 102  --   --  102   CO2 26 25  --   --  28   BUN 12.9 11.9  --   --  13.3   CR 0.82 0.73  --   --  0.90   ANIONGAP 10 10  --   --  10   KARLIE 8.8 8.9  --   --  9.0   GLC 88 101* 70   < > 90    < > = values in this interval not displayed.        Recent Results (from the past 24 hour(s))   CT Head w/o Contrast    Narrative    CT SCAN OF THE HEAD WITHOUT CONTRAST   6/13/2023 12:22 PM     HISTORY: Follow up SAH, parenchymal hemorrhage.    TECHNIQUE:  Axial images of the head and coronal reformations without  IV contrast material. Radiation dose for this scan was reduced using  automated exposure control, adjustment of the mA and/or kV according  to patient size, or iterative reconstruction technique.    COMPARISON: CT of the head 6/13/2023.    FINDINGS: No significant interval change in the acute intraparenchymal  hematoma centered in the right middle frontal gyrus compared to the  prior study measuring approximately 13 mm x 13 mm x 19 mm. There is  again mild surrounding vasogenic edema. There is a small amount of  adjacent acute subarachnoid hemorrhage in the right frontal sulci.    No other intracranial hemorrhage is identified. There is moderate  generalized brain parenchymal volume loss. Mild scattered patchy  nonspecific foci of hypoattenuation in the cerebral white matter,  likely due to chronic small vessel ischemic disease. The ventricles  appear normal in size and configuration.    Bilateral lens implants. Fracture deformity of the nasal arch, as  before. Moderate polypoid soft tissue in the left maxillary sinus and  mild to moderate scattered polypoid mucosal thickening elsewhere in  the paranasal sinuses. The mastoid and middle ear cavities appear  clear. Relative anterior subluxation of the right mandibular condyle  with respect to the ipsilateral glenoid fossa, possibly positional  and/or related to advanced degenerative changes. A posttraumatic cause  is not completely excluded. Recommend clinical correlation. The  mastoid and middle ear cavities are clear. The calvarium appears  intact.      Impression    IMPRESSION:  1. No significant interval change in an acute intraparenchymal  hematoma centered in the right middle frontal gyrus  compared to the  prior exam, with a small amount of adjacent right frontal subarachnoid  hemorrhage. No significant mass effect/herniation.  2. No new intracranial hemorrhage.  3. Brain atrophy and presumed chronic small vessel ischemic change, as  described.  4. Right periorbital superficial soft tissue swelling.  5. Unchanged nasal arch fracture deformity.  6. Asymmetric anterior subluxation across the right temporomandibular  joint is nonspecific. Recommend clinical correlation.    RANDELL NIXON MD         SYSTEM ID:  [id number]   XR Ribs Bilateral 2 Views    Narrative    XR RIBS BILATERAL 2 VIEWS 6/13/2023 1:14 PM     HISTORY: Anterior chest wall pain.    COMPARISON: Chest radiograph 6/12/2023.       Impression    IMPRESSION:    Mildly displaced left lateral/anterolateral sixth through ninth rib  fractures. No pneumothorax. Redemonstrated opacity in the right  midlung which is slightly more confluent compared to prior chest  radiographs. Recommended follow-up to ensure resolution.     NOTE: ABNORMAL REPORT    THE DICTATION ABOVE DESCRIBES AN ABNORMAL REPORT FOR WHICH FOLLOW-UP  IS NEEDED.    SLOAN LEE MD         SYSTEM ID:  [id number]   XR Chest 1 View    Narrative    CHEST 1 VIEW  6/14/2023 11:08 AM       INDICATION: Pre-op total hip replacement. Trauma patient with rib  fracture(s).    COMPARISON: 6/13/2023, 4/21/2023, 1/9/2023       Impression    IMPRESSION: Masslike consolidation in the anterior right upper lobe is  unchanged from prior examinations. Pulmonary vascular congestion. No  acute infiltrate. No pleural effusion.         Assessment & Plan      Harrison Thomas is a 77 year old male with medical history significant for COPD, HTN, HL, hypothyroidism was brought to the ER for evaluation after a fall and is admitted on 6/12/2023 for management of traumatic SAH and right femoral neck fracture.      Traumatic small volume SAH  Skin tear forehead  CT head showed small volume subarachnoid  hemorrhage at the anterolateral right frontal lobe and scalp hematoma.  -Neurosurgery consulted from ER, head CT repeated at 1:34 AM on 6/13 showed mild increase in acute subarachnoid hemorrhage in the right frontal lobe  -Head CT was again repeated 12:22 PM on 6/13/2023.  No significant interval change in acute intraparenchymal hematoma in the right middle frontal gyrus compared to previous exam with small adjacent right frontal subarachnoid hemorrhage being stable.  -At this point neurosurgery plans for follow-up head CT in clinic in 1 month.  -Per neurosurgery can initiate VTE prophylaxis on 6/15/2023  -BP control, RN hydralazine ordered for SBP >140  -Hold aspirin  -Neuro check every 4 hours  -Telemetry  -Fall and seizure precaution, defer seizure ppx to neurosurgery.  -skin tear Rt supraorbital area sutured with absorbable sutures.     Right femoral neck fracture  - Rt should XR as well given pain  - Pain control-scheduled Tylenol and gabapentin, as needed oxycodone and IV Dilaudid.  - Orthopedic surgery consulted, needs surgery but risk of DVT post op as he will not be able to resume prophylactic AC. Defer elective IVC filter to ortho if deemed high risk post op unless cleared for AC by neurosurgery.   -RCRI -1 risk factor given CAD but no active anginal symptoms. No further work up recommended prior to surgery and Overall 1% risk of correct of cardiac death, nonfatal MI and nonfatal cardiac arrest. Given SAH, further risk stratification based on recommendation from neurosurgery as well.  -PT OT when able (need to order)  -SW consult for discharge planning   - plan for surgery today  - per neurosurgey dvt prophylaxis can be started from 6/15     Mechanical fall   right-sided rib 6-9 mildly displaced fractures  Reports his Left leg natalie up sometime. Was admitted in hospital 2 months ago for fall and Rib fracture. Fell 3 weeks ago as well but no new injuries sustained.  - xray rib fx protocol done today  showed Mildly displaced left lateral/anterolateral sixth through ninth rib  fractures. No pneumothorax  - PT eval post op  - fall precautions  - rib fracture protocol ordered  -Vital capacity/NIF ordered  -On Tylenol, oxycodone, Robaxin, lidocaine and for pain management  -Incentive spirometry encouraged  -We will monitor for respiratory decompensation.  Patient currently on nasal cannula 2 L.  - trauma surgery consulted .  They agreed with current rib fracture protocol with no further new recommendations.     COPD with chronic hypoxemic resp failure, on 1LPM NC O2  BRENNEN, non complainat with CPAP  RUL mass, s/p radiation   -continue PTA inhalers  -CPAP ordered while here, discussed with patient to use  -on chronic prednisone and azithromycin, continue. Monitor for BP perioperatively closely. Stress dose steroid if needed.  -He follows up with Dr. Dumont and Pulmonology for probable lung cancer with possible radiation pneumonitis. He has been noted with RUL mass like lesion (PET positive). He underwent VATS biopsy (02/2022) by Dr Dumont but was noted to be incomplete due to a lot of scar tissue. He subsequently underwent radiation therapy for enlarging right upper lobe density.      HTN, HL  CAD  Circumflex stent in 1997, angiogram in 2005 showed moderate LAD and diagonal disease on medical management.  No anginal symptoms reported currently.  -resume PTA regimen including Norvasc, Toprol-XL and rosuvastatin.  -Hold aspirin given SAH     Hypothyroidism  History of papillary thyroid cancer s/p thyroidectomy  Resume PTA Synthroid        Diet: diet advanced . Npo after midnight   DVT Prophylaxis: Pneumatic Compression Devices  Mir Catheter: Not present  Lines: None     Cardiac Monitoring: None  Code Status:  DNR/DNI    Clinically Significant Risk Factors                  # Hypertension: Noted on problem list                    Elaina Quintero MD, MD  [phone](p)

## 2023-06-14 NOTE — ANESTHESIA POSTPROCEDURE EVALUATION
Patient: Harrison Thomas    Procedure: Procedure(s):  Right total hip arthroplasty       Anesthesia Type:  General    Note:     Postop Pain Control: Uneventful            Sign Out: Well controlled pain   PONV: No   Neuro/Psych: Uneventful            Sign Out: Acceptable/Baseline neuro status   Airway/Respiratory: Uneventful            Sign Out: Acceptable/Baseline resp. status   CV/Hemodynamics: Uneventful            Sign Out: Acceptable CV status   Other NRE: NONE   DID A NON-ROUTINE EVENT OCCUR?            Last vitals:  Vitals Value Taken Time   /66 06/14/23 1715   Temp 36.3  C (97.4  F) 06/14/23 1645   Pulse 77 06/14/23 1722   Resp 19 06/14/23 1722   SpO2 98 % 06/14/23 1722   Vitals shown include unvalidated device data.    Electronically Signed By: Jules Kramer MD  June 14, 2023  5:23 PM

## 2023-06-14 NOTE — CONSULTS
Trauma consult received on this 77-year-old gentleman who presented to the hospital on June 12 after mechanical fall.  Identified injuries at the time of presentation were traumatic small volume subarachnoid hemorrhage, forehead laceration, right femoral neck fracture.  Initial chest x-ray was negative for fracture.  Follow-up rib series performed close to 24 hours after admission showed left-sided 6 through ninth rib fractures.  No evidence of pneumothorax or hemothorax.  Patient initiated on rib fracture protocol.  Needs good pulmonary toilet and pain control.  No further input regarding care on this delayed diagnosis.

## 2023-06-14 NOTE — PLAN OF CARE
Goal Outcome Evaluation:    Pt A&O x4. VSS on 2L. Pain managed with oxycodone, atarax, robaxin, & IV dilaudid. Lidocaine patch to R chest. NPO. CHG bath completed. Voiding well via urinal. Bedrest. On tele, SR. Plan to have surgery at 1240 today.

## 2023-06-14 NOTE — ANESTHESIA PROCEDURE NOTES
Airway       Patient location during procedure: OR       Procedure Start/Stop Times: 6/14/2023 1:09 PM  Staff -        Anesthesiologist:  Jules Kramer MD       CRNA: Khoa Clark APRN CRNA       Performed By: anesthesiologist  Consent for Airway        Urgency: elective  Indications and Patient Condition       Indications for airway management: janes-procedural       Induction type:intravenous       Mask difficulty assessment: 1 - vent by mask    Final Airway Details       Final airway type: endotracheal airway       Successful airway: ETT - single and Oral  Endotracheal Airway Details        ETT size (mm): 8.0       Cuffed: yes       Cuff volume (mL): 6       Successful intubation technique: direct laryngoscopy       DL Blade Type: Peters 2       Grade View of Cords: 1       Adjucts: stylet       Position: Right       Measured from: gums/teeth       Secured at (cm): 23       Bite block used: None    Post intubation assessment        Placement verified by: capnometry, equal breath sounds and chest rise        Number of attempts at approach: 1       Number of other approaches attempted: 0       Secured with: pink tape       Ease of procedure: easy       Dentition: Intact and Unchanged    Medication(s) Administered   Medication Administration Time: 6/14/2023 1:09 PM

## 2023-06-14 NOTE — PROGRESS NOTES
Received from surgery @ 1815 ,A&O x4, on tele. Complain of pain at 7/10, given IV dilaudid x1 and scheduled tylenol. O2 sat above 92%  at 4- 5LNC. Tolerating ice chips,  dinner ordered, surgical dressing dry and intact. Ice pack applied. Will continue to monitor.

## 2023-06-14 NOTE — ANESTHESIA CARE TRANSFER NOTE
Patient: Harrison Thomas    Procedure: Procedure(s):  Right total hip arthroplasty       Diagnosis: Fall, initial encounter [W19.XXXA]  Closed fracture of neck of right femur, initial encounter (H) [S72.001A]  Diagnosis Additional Information: No value filed.    Anesthesia Type:   General     Note:    Oropharynx: oropharynx clear of all foreign objects and spontaneously breathing  Level of Consciousness: drowsy  Oxygen Supplementation: face mask  Level of Supplemental Oxygen (L/min / FiO2): 8  Independent Airway: airway patency satisfactory and stable  Dentition: dentition unchanged  Vital Signs Stable: post-procedure vital signs reviewed and stable  Report to RN Given: handoff report given  Patient transferred to: PACU    Handoff Report: Identifed the Patient, Identified the Reponsible Provider, Reviewed the pertinent medical history, Discussed the surgical course, Reviewed Intra-OP anesthesia mangement and issues during anesthesia, Set expectations for post-procedure period and Allowed opportunity for questions and acknowledgement of understanding      Vitals:  Vitals Value Taken Time   /54 06/14/23 1525   Temp     Pulse 76 06/14/23 1527   Resp 13 06/14/23 1527   SpO2 99 % 06/14/23 1527   Vitals shown include unvalidated device data.    Electronically Signed By: PREETI Mendoza CRNA  June 14, 2023  3:28 PM

## 2023-06-14 NOTE — CARE CONFERENCE
Patient putting forth good effort this morning with NIF/VC achieving -40 and 1080ml. RT will continue to monitor    Danish Garcia, RRT
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
## 2023-06-15 ENCOUNTER — APPOINTMENT (OUTPATIENT)
Dept: PHYSICAL THERAPY | Facility: CLINIC | Age: 78
DRG: 956 | End: 2023-06-15
Payer: MEDICARE

## 2023-06-15 ENCOUNTER — APPOINTMENT (OUTPATIENT)
Dept: GENERAL RADIOLOGY | Facility: CLINIC | Age: 78
DRG: 956 | End: 2023-06-15
Attending: PHYSICIAN ASSISTANT
Payer: MEDICARE

## 2023-06-15 ENCOUNTER — TELEPHONE (OUTPATIENT)
Dept: FAMILY MEDICINE | Facility: CLINIC | Age: 78
End: 2023-06-15
Payer: MEDICARE

## 2023-06-15 DIAGNOSIS — J44.9 COPD, VERY SEVERE (H): Primary | ICD-10-CM

## 2023-06-15 LAB
ANION GAP SERPL CALCULATED.3IONS-SCNC: 10 MMOL/L (ref 7–15)
BUN SERPL-MCNC: 15 MG/DL (ref 8–23)
CALCIUM SERPL-MCNC: 8.3 MG/DL (ref 8.8–10.2)
CHLORIDE SERPL-SCNC: 104 MMOL/L (ref 98–107)
CREAT SERPL-MCNC: 0.77 MG/DL (ref 0.67–1.17)
DEPRECATED HCO3 PLAS-SCNC: 24 MMOL/L (ref 22–29)
GFR SERPL CREATININE-BSD FRML MDRD: >90 ML/MIN/1.73M2
GLUCOSE BLDC GLUCOMTR-MCNC: 120 MG/DL (ref 70–99)
GLUCOSE BLDC GLUCOMTR-MCNC: 123 MG/DL (ref 70–99)
GLUCOSE BLDC GLUCOMTR-MCNC: 161 MG/DL (ref 70–99)
GLUCOSE SERPL-MCNC: 176 MG/DL (ref 70–99)
HBA1C MFR BLD: 5.3 %
HGB BLD-MCNC: 11.5 G/DL (ref 13.3–17.7)
MAGNESIUM SERPL-MCNC: 2.3 MG/DL (ref 1.7–2.3)
PHOSPHATE SERPL-MCNC: 2.5 MG/DL (ref 2.5–4.5)
POTASSIUM SERPL-SCNC: 4.2 MMOL/L (ref 3.4–5.3)
SODIUM SERPL-SCNC: 138 MMOL/L (ref 136–145)

## 2023-06-15 PROCEDURE — 94150 VITAL CAPACITY TEST: CPT

## 2023-06-15 PROCEDURE — 250N000009 HC RX 250: Performed by: PHYSICIAN ASSISTANT

## 2023-06-15 PROCEDURE — 97110 THERAPEUTIC EXERCISES: CPT | Mod: GP

## 2023-06-15 PROCEDURE — 97116 GAIT TRAINING THERAPY: CPT | Mod: GP

## 2023-06-15 PROCEDURE — 83036 HEMOGLOBIN GLYCOSYLATED A1C: CPT | Performed by: HOSPITALIST

## 2023-06-15 PROCEDURE — 71045 X-RAY EXAM CHEST 1 VIEW: CPT

## 2023-06-15 PROCEDURE — 80048 BASIC METABOLIC PNL TOTAL CA: CPT | Performed by: PHYSICIAN ASSISTANT

## 2023-06-15 PROCEDURE — 97530 THERAPEUTIC ACTIVITIES: CPT | Mod: GP

## 2023-06-15 PROCEDURE — 36415 COLL VENOUS BLD VENIPUNCTURE: CPT | Performed by: PHYSICIAN ASSISTANT

## 2023-06-15 PROCEDURE — 250N000013 HC RX MED GY IP 250 OP 250 PS 637: Performed by: PHYSICIAN ASSISTANT

## 2023-06-15 PROCEDURE — 97161 PT EVAL LOW COMPLEX 20 MIN: CPT | Mod: GP

## 2023-06-15 PROCEDURE — 250N000011 HC RX IP 250 OP 636: Performed by: PHYSICIAN ASSISTANT

## 2023-06-15 PROCEDURE — 85018 HEMOGLOBIN: CPT | Performed by: PHYSICIAN ASSISTANT

## 2023-06-15 PROCEDURE — 999N000157 HC STATISTIC RCP TIME EA 10 MIN

## 2023-06-15 PROCEDURE — 250N000012 HC RX MED GY IP 250 OP 636 PS 637: Performed by: PHYSICIAN ASSISTANT

## 2023-06-15 PROCEDURE — 84100 ASSAY OF PHOSPHORUS: CPT | Performed by: PHYSICIAN ASSISTANT

## 2023-06-15 PROCEDURE — 99232 SBSQ HOSP IP/OBS MODERATE 35: CPT | Performed by: HOSPITALIST

## 2023-06-15 PROCEDURE — 83735 ASSAY OF MAGNESIUM: CPT | Performed by: PHYSICIAN ASSISTANT

## 2023-06-15 PROCEDURE — 120N000001 HC R&B MED SURG/OB

## 2023-06-15 PROCEDURE — 250N000012 HC RX MED GY IP 250 OP 636 PS 637: Performed by: HOSPITALIST

## 2023-06-15 RX ORDER — LIDOCAINE HYDROCHLORIDE 20 MG/ML
JELLY TOPICAL ONCE
Status: COMPLETED | OUTPATIENT
Start: 2023-06-15 | End: 2023-06-15

## 2023-06-15 RX ORDER — FLUTICASONE FUROATE, UMECLIDINIUM BROMIDE AND VILANTEROL TRIFENATATE 200; 62.5; 25 UG/1; UG/1; UG/1
1 POWDER RESPIRATORY (INHALATION) DAILY
Qty: 3 EACH | Refills: 3 | Status: SHIPPED | OUTPATIENT
Start: 2023-06-15 | End: 2023-06-19

## 2023-06-15 RX ORDER — DEXTROSE MONOHYDRATE 25 G/50ML
25-50 INJECTION, SOLUTION INTRAVENOUS
Status: DISCONTINUED | OUTPATIENT
Start: 2023-06-15 | End: 2023-06-17 | Stop reason: HOSPADM

## 2023-06-15 RX ORDER — NICOTINE POLACRILEX 4 MG
15-30 LOZENGE BUCCAL
Status: DISCONTINUED | OUTPATIENT
Start: 2023-06-15 | End: 2023-06-17 | Stop reason: HOSPADM

## 2023-06-15 RX ADMIN — HYDROMORPHONE HYDROCHLORIDE 0.3 MG: 1 INJECTION, SOLUTION INTRAMUSCULAR; INTRAVENOUS; SUBCUTANEOUS at 05:44

## 2023-06-15 RX ADMIN — Medication 25 MCG: at 09:54

## 2023-06-15 RX ADMIN — ACETAMINOPHEN 975 MG: 325 TABLET ORAL at 23:58

## 2023-06-15 RX ADMIN — Medication 50 MCG: at 09:28

## 2023-06-15 RX ADMIN — AZITHROMYCIN MONOHYDRATE 500 MG: 250 TABLET ORAL at 09:30

## 2023-06-15 RX ADMIN — OXYCODONE HYDROCHLORIDE 10 MG: 5 TABLET ORAL at 01:24

## 2023-06-15 RX ADMIN — GABAPENTIN 300 MG: 300 CAPSULE ORAL at 16:00

## 2023-06-15 RX ADMIN — ACETAMINOPHEN 975 MG: 325 TABLET ORAL at 16:00

## 2023-06-15 RX ADMIN — ACETAMINOPHEN 975 MG: 325 TABLET ORAL at 01:24

## 2023-06-15 RX ADMIN — HYDROMORPHONE HYDROCHLORIDE 0.3 MG: 1 INJECTION, SOLUTION INTRAMUSCULAR; INTRAVENOUS; SUBCUTANEOUS at 14:26

## 2023-06-15 RX ADMIN — OXYCODONE HYDROCHLORIDE 10 MG: 5 TABLET ORAL at 23:58

## 2023-06-15 RX ADMIN — GABAPENTIN 300 MG: 300 CAPSULE ORAL at 22:06

## 2023-06-15 RX ADMIN — OXYCODONE HYDROCHLORIDE 10 MG: 5 TABLET ORAL at 09:25

## 2023-06-15 RX ADMIN — GABAPENTIN 300 MG: 300 CAPSULE ORAL at 09:30

## 2023-06-15 RX ADMIN — ACETAMINOPHEN 975 MG: 325 TABLET ORAL at 09:28

## 2023-06-15 RX ADMIN — ASPIRIN 325 MG: 325 TABLET, COATED ORAL at 09:28

## 2023-06-15 RX ADMIN — DOCUSATE SODIUM 100 MG: 100 CAPSULE, LIQUID FILLED ORAL at 09:30

## 2023-06-15 RX ADMIN — MULTIPLE VITAMINS W/ MINERALS TAB 1 TABLET: TAB at 09:28

## 2023-06-15 RX ADMIN — PREDNISONE 2 MG: 1 TABLET ORAL at 09:30

## 2023-06-15 RX ADMIN — DULOXETINE HYDROCHLORIDE 20 MG: 20 CAPSULE, DELAYED RELEASE ORAL at 22:06

## 2023-06-15 RX ADMIN — AMLODIPINE BESYLATE 5 MG: 5 TABLET ORAL at 22:05

## 2023-06-15 RX ADMIN — LIDOCAINE HYDROCHLORIDE: 20 JELLY TOPICAL at 15:57

## 2023-06-15 RX ADMIN — Medication 600 MG: at 09:30

## 2023-06-15 RX ADMIN — OXYCODONE HYDROCHLORIDE 10 MG: 5 TABLET ORAL at 16:25

## 2023-06-15 RX ADMIN — HYDROMORPHONE HYDROCHLORIDE 0.3 MG: 1 INJECTION, SOLUTION INTRAMUSCULAR; INTRAVENOUS; SUBCUTANEOUS at 20:59

## 2023-06-15 RX ADMIN — DOCUSATE SODIUM 100 MG: 100 CAPSULE, LIQUID FILLED ORAL at 20:33

## 2023-06-15 RX ADMIN — INSULIN ASPART 1 UNITS: 100 INJECTION, SOLUTION INTRAVENOUS; SUBCUTANEOUS at 14:28

## 2023-06-15 RX ADMIN — HYDROXYZINE HYDROCHLORIDE 10 MG: 10 TABLET ORAL at 01:25

## 2023-06-15 RX ADMIN — LEVOTHYROXINE SODIUM 137 MCG: 137 TABLET ORAL at 09:30

## 2023-06-15 RX ADMIN — LIDOCAINE 1 PATCH: 560 PATCH PERCUTANEOUS; TOPICAL; TRANSDERMAL at 20:33

## 2023-06-15 RX ADMIN — METOPROLOL SUCCINATE 50 MG: 50 TABLET, EXTENDED RELEASE ORAL at 20:33

## 2023-06-15 RX ADMIN — CEFAZOLIN 1 G: 1 INJECTION, POWDER, FOR SOLUTION INTRAMUSCULAR; INTRAVENOUS at 04:44

## 2023-06-15 RX ADMIN — ROSUVASTATIN CALCIUM 10 MG: 10 TABLET, FILM COATED ORAL at 20:33

## 2023-06-15 ASSESSMENT — ACTIVITIES OF DAILY LIVING (ADL)
ADLS_ACUITY_SCORE: 22
ADLS_ACUITY_SCORE: 24
ADLS_ACUITY_SCORE: 24
DEPENDENT_IADLS:: INDEPENDENT
ADLS_ACUITY_SCORE: 24
ADLS_ACUITY_SCORE: 22
ADLS_ACUITY_SCORE: 22
ADLS_ACUITY_SCORE: 24

## 2023-06-15 NOTE — PROGRESS NOTES
Respiratory Care:    1500  NIF -40cm H20  VC 1500 ml      1956  NIF -40cm H20  VC 1420ml  Effort: Good    HORTENCIA URIOSTEGUI, RRT on 6/15/2023 at 3:26 PM

## 2023-06-15 NOTE — PROGRESS NOTES
[facility]    Hospitalist Progress Note    Interval History     Patient awake and alert.  Sitting up in chair.  Underwent right hip surgery yesterday.  Continues to endorse significant right hip pain and right-sided chest pain from his rib fractures and fall.  No other complaints at this time.    -Data reviewed today: I reviewed all new labs and imaging results over the last 24 hours. I personally reviewed the chest x-ray image(s) showing Multiple rib fractures.    Physical Exam   Temp: 97.7  F (36.5  C) Temp src: Oral BP: 118/63 Pulse: 80   Resp: 16 SpO2: 94 % O2 Device: Nasal cannula Oxygen Delivery: 2 LPM  Vitals:    06/12/23 1240 06/13/23 0714 06/14/23 0618   Weight: 74.8 kg (165 lb) (!) 169.4 kg (373 lb 7.4 oz) 78.9 kg (173 lb 15.1 oz)     Vital Signs with Ranges  Temp:  [97  F (36.1  C)-98.7  F (37.1  C)] 97.7  F (36.5  C)  Pulse:  [] 80  Resp:  [11-23] 16  BP: (104-143)/(53-73) 118/63  SpO2:  [90 %-99 %] 94 %  I/O last 3 completed shifts:  In: 1060 [P.O.:60; I.V.:1000]  Out: 2550 [Urine:2550]    Physical Exam  Constitutional:       Appearance: Normal appearance.   HENT:      Head: Normocephalic.   Eyes:      Pupils: Pupils are equal, round, and reactive to light.   Cardiovascular:      Rate and Rhythm: Normal rate and regular rhythm.      Pulses: Normal pulses.      Heart sounds: Normal heart sounds.   Pulmonary:      Effort: Pulmonary effort is normal. No respiratory distress.      Breath sounds: Normal breath sounds.   Abdominal:      General: Abdomen is flat. Bowel sounds are normal. There is no distension.      Tenderness: There is no abdominal tenderness. There is no guarding.   Musculoskeletal:      Cervical back: Normal range of motion.      Comments: Right hip and leg significantly painful.  Surgical site appears stable.   Skin:     General: Skin is warm and dry.      Comments: Bruising around the right eye.  Sutures about the right eye with dressing in place    Neurological:      General: No focal deficit present.           Medications     sodium chloride 75 mL/hr at 06/14/23 1853       acetaminophen  975 mg Oral Q8H     amLODIPine  5 mg Oral At Bedtime     aspirin  325 mg Oral Daily     azithromycin  500 mg Oral Every Other Day     calcium carbonate  600 mg Oral Daily     docusate sodium  100 mg Oral BID     DULoxetine  20 mg Oral At Bedtime     Fluticasone-Umeclidin-Vilant  1 puff Inhalation Daily     gabapentin  300 mg Oral TID     insulin aspart  1-3 Units Subcutaneous TID AC     insulin aspart  1-3 Units Subcutaneous At Bedtime     levothyroxine  137 mcg Oral Daily     lidocaine  1 patch Transdermal Q24H     lidocaine   Transdermal Q8H JULIETA     metoprolol succinate ER  50 mg Oral QPM     multivitamin w/minerals  1 tablet Oral Daily     predniSONE  2 mg Oral Daily     rosuvastatin  10 mg Oral QPM     sodium chloride (PF)  3 mL Intracatheter Q8H     Vitamin D3  25 mcg Oral Daily     cholecalciferol  50 mcg Oral Daily       Data   Recent Labs   Lab 06/15/23  0742 06/14/23  2253 06/14/23  2208 06/14/23  0727 06/13/23  0654 06/13/23  0220 06/12/23  1407   WBC  --   --   --   --   --   --  10.1   HGB 11.5*  --   --   --  13.5  --  14.0   MCV  --   --   --   --   --   --  91   PLT  --   --   --   --   --   --  194   INR  --   --   --   --  1.09  --   --      --   --  138 137  --  140   POTASSIUM 4.2  --   --  3.7 4.2  --  4.3   CHLORIDE 104  --   --  102 102  --  102   CO2 24  --   --  26 25  --  28   BUN 15.0  --   --  12.9 11.9  --  13.3   CR 0.77  --   --  0.82 0.73  --  0.90   ANIONGAP 10  --   --  10 10  --  10   KARLIE 8.3*  --   --  8.8 8.9  --  9.0   * 251* 317* 88 101*   < > 90    < > = values in this interval not displayed.       Recent Results (from the past 24 hour(s))   XR Surgery FREDY Fluoro Less Than 5 Min w Stills    Narrative    SURGERY C-ARM FLUORO LESS THAN 5 MINUTES WITH STILLS 6/14/2023 2:50 PM      HISTORY: Right anterior total hip  arthroplasty.    NUMBER OF IMAGES ACQUIRED: 2    VIEWS: 1    FLUOROSCOPY TIME: 0.4 minutes.      Impression    IMPRESSION: Total hip arthroplasty without evidence of complication,  periprosthetic fracture or malalignment.    ALBERT SANTANA MD         SYSTEM ID:  [id number]   XR Pelvis w Hip Port Right 1 View    Narrative    XR PELVIS AND HIP PORTABLE RIGHT 1 VIEW   6/14/2023 4:00 PM     HISTORY:  Status post Hip surgery    Comparison: 6/12/2023 radiographs.      Impression    IMPRESSION: New right total hip arthroplasty with adjacent gas.  Excellent position and alignment. There is no evidence of complication  or periprosthetic fracture. No other change.    ALBERT SANTANA MD         SYSTEM ID:  [id number]   XR Chest 1 View    Narrative    CHEST ONE VIEW  6/15/2023 8:48 AM       INDICATION: Trauma Patient with Rib Fracture(s).    COMPARISON: 6/14/2023       Impression    IMPRESSION: Masslike consolidation in the inferior right upper lobe is  unchanged. Pulmonary vascular congestion. Lungs otherwise clear. No  pneumothorax or pleural effusion.         Assessment & Plan      Harrison Thomas is a 77 year old male with medical history significant for COPD, HTN, HL, hypothyroidism was brought to the ER for evaluation after a fall and is admitted on 6/12/2023 for management of traumatic SAH and right femoral neck fracture.      Traumatic small volume SAH  Skin tear forehead  CT head showed small volume subarachnoid hemorrhage at the anterolateral right frontal lobe and scalp hematoma.  -Neurosurgery consulted from ER, head CT repeated at 1:34 AM on 6/13 showed mild increase in acute subarachnoid hemorrhage in the right frontal lobe  -Head CT was again repeated 12:22 PM on 6/13/2023.  No significant interval change in acute intraparenchymal hematoma in the right middle frontal gyrus compared to previous exam with small adjacent right frontal subarachnoid hemorrhage being stable.  -At this point neurosurgery plans  for follow-up head CT in clinic in 1 month.  -Per neurosurgery can initiate VTE prophylaxis on 6/15/2023.  Started on aspirin 325 mg daily.  -BP control, RN hydralazine ordered for SBP >140  -Neuro check every 4 hours  -Telemetry  -Fall and seizure precaution, defer seizure ppx to neurosurgery.  -skin tear Rt supraorbital area sutured with absorbable sutures.     Right femoral neck fracture  - Rt should XR as well given pain  - Pain control-scheduled Tylenol and gabapentin, as needed oxycodone and IV Dilaudid.  - Orthopedic surgery consulted, needs surgery but risk of DVT post op as he will not be able to resume prophylactic AC. Defer elective IVC filter to ortho if deemed high risk post op unless cleared for AC by neurosurgery.   -RCRI -1 risk factor given CAD but no active anginal symptoms. No further work up recommended prior to surgery and Overall 1% risk of correct of cardiac death, nonfatal MI and nonfatal cardiac arrest. Given SAH, further risk stratification based on recommendation from neurosurgery as well.  -PT OT ordered.  Patient will likely need TCU.  Social work consulted.  - consult for discharge planning   -Underwent right total hip arthroplasty on 6/14/2023.  Postop day 1.  - per neurosurgey dvt prophylaxis can be started from 6/15.  Started on aspirin 325 mg daily.     Mechanical fall   right-sided rib 6-9 mildly displaced fractures  Reports his Left leg natalie up sometime. Was admitted in hospital 2 months ago for fall and Rib fracture. Fell 3 weeks ago as well but no new injuries sustained.  - xray rib fx protocol done today showed Mildly displaced left lateral/anterolateral sixth through ninth rib  fractures. No pneumothorax  - PT eval post op  - fall precautions  - rib fracture protocol ordered  -Vital capacity/NIF ordered.  -40 for neph and 1380 mL for vital capacity today.  -On Tylenol, oxycodone, Robaxin, lidocaine and for pain management  -Incentive spirometry encouraged  -We will  monitor for respiratory decompensation.  Patient currently on nasal cannula 2 L.  - trauma surgery consulted .  They agreed with current rib fracture protocol with no further new recommendations.     COPD with chronic hypoxemic resp failure, on 1LPM NC O2  BRENNEN, non complainat with CPAP  RUL mass, s/p radiation   -continue PTA inhalers  -CPAP ordered while here, discussed with patient to use  -on chronic prednisone and azithromycin, continue. Monitor for BP perioperatively closely. Stress dose steroid if needed.  -He follows up with Dr. Dumont and Pulmonology for probable lung cancer with possible radiation pneumonitis. He has been noted with RUL mass like lesion (PET positive). He underwent VATS biopsy (02/2022) by Dr Dumont but was noted to be incomplete due to a lot of scar tissue. He subsequently underwent radiation therapy for enlarging right upper lobe density.      HTN, HL  CAD  Circumflex stent in 1997, angiogram in 2005 showed moderate LAD and diagonal disease on medical management.  No anginal symptoms reported currently.  -resume PTA regimen including Norvasc, Toprol-XL and rosuvastatin.  -Started on aspirin 325 mg daily on 6/15/2023.     Hypothyroidism  History of papillary thyroid cancer s/p thyroidectomy  Resume PTA Synthroid    Hyperglycemia-patient had couple of episodes of hypoglycemia postoperatively.  HbA1c pending.  Will place on sliding scale insulin aspart.     Diet: diet advanced .  DVT Prophylaxis: Pneumatic Compression Devices  Mir Catheter: Not present  Lines: None     Cardiac Monitoring: None  Code Status:  DNR/DNI    Clinically Significant Risk Factors                  # Hypertension: Noted on problem list                    Elaina Quintero MD, MD  [phone](p)

## 2023-06-15 NOTE — PROGRESS NOTES
Leg Swelling in Both Legs    Swelling of the feet, ankles, and legs is called edema. It is caused by excess fluid that has collected in the tissues. Extra fluid in the body settles in the lowest part because of gravity.  This is why the legs and feet are Orthopedic Surgery  Harrison Thomas  06/15/2023     Admit Date:  6/12/2023    POD: 1 Day Post-Op   Procedure(s):  Right total hip arthroplasty    Patient resting comfortably in bed. Wife at bedside.  Pain controlled.  Occasional spasms.  No calf pain or cramping.  Denies numbness or tingling.  Tolerating oral intake.    Denies nausea or vomiting.  Denies chest pain or shortness of breath.  No acute events overnight.    Of note, the patient is also healing from an acute, traumatic right SAH. Neurosurgery previously following and have since signed off. Okay for VTE pharmacoprophylaxis per neurosurgery note on 6/13/23.    Temp:  [97  F (36.1  C)-98.7  F (37.1  C)] 97.7  F (36.5  C)  Pulse:  [] 80  Resp:  [11-23] 16  BP: (104-143)/(53-73) 118/63  SpO2:  [90 %-99 %] 94 %    Alert and oriented.   Right hip dressing is clean, dry, and intact.   Minimal erythema and ecchymosis of the surrounding skin.   Mild swelling to the right thigh.  Bilateral calves are soft, non-tender.  Right lower extremity is NVI.  5/5 ankle DF and PF, EHL, and FHL.  DP pulse palpable.  Sensation intact bilateral lower extremities.    Labs/Imaging:  Recent Labs   Lab Test 06/15/23  0742 06/13/23  0654 06/12/23  1407 05/04/23  0844 04/24/23  0732   WBC  --   --  10.1 9.0 17.3*   HGB 11.5* 13.5 14.0 13.7 14.4   PLT  --   --  194 155 165     Recent Labs   Lab Test 06/13/23  0654 04/21/23  1043 02/11/22  0749   INR 1.09 0.94 1.03     Recent Labs   Lab Test 02/09/23  1225 12/03/22  1004 11/15/22  1223   CRPI 20.53* 26.80* 97.90*       A/P    1. S/p right DA LIZABETH for a femoral neck fracture  -Discussed pain level expectations and anticipated postoperative course with the patient and his wife. They had multiple questions regarding postoperative cares and disposition planning. All questions and concerns answered to their satisfaction.  -Continue  mg QD for DVT prophylaxis.    -Mobilize with PT/OT.  -WBAT RLE with walker.  -No right hip ER  and hyperextension.  -Continue current pain regimen.  -Dressings: Keep intact. Change if 60% saturated or peeling/falling off.   -Follow-up: 2 weeks post-op with Dr. Lazaro    2. Disposition  -Anticipate d/c to TCU when medically cleared and progressing in PT.    Elvira Ulloa PA-C  [facility]   · If your healthcare provider says that your leg swelling is caused by venous insufficiency or varicose veins, don't sit or  one place for long periods of time. Take breaks and walk about every few hours. Brisk walking is a good exercise.  It helps

## 2023-06-15 NOTE — TELEPHONE ENCOUNTER
Called and informed completed form faxed.  Original mailed to Pt.  Copy sent to [facility] and placed in [employer]ion file

## 2023-06-15 NOTE — CONSULTS
Care Management Initial Consult    General Information  Assessment completed with: Patient, Spouse or significant other,    Type of CM/SW Visit: Initial Assessment    Primary Care Provider verified and updated as needed: Yes   Readmission within the last 30 days: no previous admission in last 30 days      Reason for Consult: discharge planning  Advance Care Planning: Advance Care Planning Reviewed: present on chart          Communication Assessment  Patient's communication style: spoken language (English or Bilingual)    Hearing Difficulty or Deaf: no   Wear Glasses or Blind: yes    Cognitive  Cognitive/Neuro/Behavioral: WDL     Arousal Level: arouses to voice  Orientation: oriented x 4             Living Environment:   People in home: spouse     Current living Arrangements: house ([place])      Able to return to prior arrangements: other (see comments)  Living Arrangement Comments: TCU prior to return    Family/Social Support:  Care provided by: spouse/significant other, self  Provides care for: no one  Marital Status:   Wife, Children ([place])   ([place])       Description of Support System: Supportive, Involved    Support Assessment: Adequate family and caregiver support    Current Resources:   Patient receiving home care services:       Community Resources:    Equipment currently used at home: other (see comments) (walking stick)  Supplies currently used at home:      Employment/Financial:  Employment Status: retired        Financial Concerns: No concerns identified           Does the patient's insurance plan have a 3 day qualifying hospital stay waiver?  Yes   Will the waiver be used for post-acute placement? Yes    Lifestyle & Psychosocial Needs:  Social Determinants of Health     Tobacco Use: Medium Risk (6/15/2023)    Patient History      Smoking Tobacco Use: Former      Smokeless Tobacco Use: Never      Passive Exposure: Not on file   Alcohol Use: Not on file   Financial Resource Strain: Not on file    Food Insecurity: Not on file   Transportation Needs: Not on file   Physical Activity: Not on file   Stress: Not on file   Social Connections: Not on file   Intimate Partner Violence: Not on file   Depression: Not at risk (5/4/2023)    PHQ-2      PHQ-2 Score: 2   Housing Stability: Not on file       Functional Status:  Prior to admission patient needed assistance:   Dependent ADLs:: Ambulation-cane, Ambulation-walker (Just started using the walker)  Dependent IADLs:: Independent       Mental Health Status:  Mental Health Status: No Current Concerns       Chemical Dependency Status:  Chemical Dependency Status: No Current Concerns             Values/Beliefs:  Spiritual, Cultural Beliefs, Confucianist Practices, Values that affect care:                 Additional Information:  Consult for discharge planning. 77 year old male patient admitted on June 12 2023 for a fall and a tentative discharge date of Ann 15.  Writer reviewed chart and PT recommendations at discharge. Writer met with patient at bedside and introduced self and role. Writer confirmed patient's primary doctor is Dr Dozier. Patient in agreement for TCU at discharge and would like referrals sent to [facility]. Writer discussed private room and cost associated vs shared room and patient would like shared room, Referrals sent via [facility].  Writer discussed transportation options and possible out of pocket costs of transport with patient. Patient would like his wife to provide at discharge.    JUANA Patricia

## 2023-06-15 NOTE — PROGRESS NOTES
Care Management Follow Up    Length of Stay (days): 3    Expected Discharge Date: 06/16/2023     Concerns to be Addressed:       Patient plan of care discussed at interdisciplinary rounds: Yes    Anticipated Discharge Disposition: Skilled Nursing Facility     Anticipated Discharge Services:    Anticipated Discharge DME:      Patient/family educated on Medicare website which has current facility and service quality ratings:    Education Provided on the Discharge Plan:    Patient/Family in Agreement with the Plan:      Referrals Placed by CM/SW:    Private pay costs discussed: Not applicable    Additional Information:  [facility] called to discuss placement. They are full currently but are holding on to the referral in case something opens up. Facility needs to know if patient uses Cpap at home or just in hospital.  Writer asked patient if he uses CPAP at home and he said he did not.   JARAD following for safe discharge plan.       JUANA Patricia

## 2023-06-15 NOTE — PROVIDER NOTIFICATION
Patient vital signs are at baseline: Yes. VSS, A&OX4. R hip dressing CDI, ice applied.   Patient able to ambulate as they were prior to admission or with assist devices provided by therapies during their stay:  No, unsteady but pt was able to ambulated to bathroom 2 assist gbw. PT following  Patient MUST void prior to discharge:  NO, attempts with urinal and toilet with no success. Bladder scanned and straight cath with output 950ml  Patient able to tolerate oral intake:  Yes, IV SL  Pain has adequate pain control using Oral analgesics:  Yes, pain controlled, alternate oral and IV meds dilaudid and oxycodone, atarax and scheduled tylenol at night.    Does patient have an identified :  Yes  Has goal D/C date and time been discussed with patient:  Yes     Progressing: utilized incentive spirometer , coughing deep breathing done. Pt pleasant and conversational in good spirit.

## 2023-06-15 NOTE — PLAN OF CARE
Goal Outcome Evaluation:      Plan of Care Reviewed With: patient  .Date/Time: 6/15/23 1900    Trauma/Ortho/Medical (Choose one) Trauma    Diagnosis: R total hip arthoplasty after fall  POD#:1  Mental Status: A&O x4  Activity/dangle: strong assist of 1, walker, gait belt  Diet: Regular  Pain: taking oxycodone, 1 dose of IV dilaudid for BTP  Mir/Voiding: pt unable to void, bladder scanned for 501 mL, straight cathed for 500 cc  Tele/Restraints/Iso: NA  02/LDA: O2 2 L, Base line 1L at noc at home, PIV saline locked  D/C Date: TBD  Other Info: Pt on seizure precautions, bed rail pads in place, difficult catheterization

## 2023-06-15 NOTE — TELEPHONE ENCOUNTER
Forms/Letter Request    Type of form/letter: [facility] patient assistance program    Have you been seen for this request: Yes Dr. Dozier    Do we have the form/letter: Yes: Behind  in black box    Who is the form from? [facility]    Where did/will the form come from? Patient or family brought in       When is form/letter needed by: this week    How would you like the form/letter returned: n/a    Patient Notified form requests are processed in 3-5 business days:Yes    Could we send this information to you in [employer] or would you prefer to receive a phone call?:   Patient would prefer a phone call   Okay to leave a detailed message?: Yes at Cell number on file:    Telephone Information:   Mobile [phone]

## 2023-06-15 NOTE — BRIEF OP NOTE
[facility]    Brief Operative Note    Pre-operative diagnosis: Fall, initial encounter [W19.XXXA]  Closed fracture of neck of right femur, initial encounter (H) [S72.001A]  Post-operative diagnosis Same as pre-operative diagnosis    Procedure: Procedure(s):  Right total hip arthroplasty  Surgeon: Surgeon(s) and Role:     * Alexandro Lazaro MD - Primary     * Stevie Cool PA-C - Assisting  Anesthesia: Choice with Block   Estimated Blood Loss: 200 ml    Drains: None  Specimens: * No specimens in log *  Findings:   Femoral neck fracture.  Complications: None.  Implants:   Implant Name Type Inv. Item Serial No.  Lot No. LRB No. Used Action   IMP SHELL ACETABULUM HOWM 54MM 542-11-54F - [id number] Total Joint Component/Insert IMP SHELL ACETABULUM HOWM 54MM 542-11-54F  [employer] [id number] Right 1 Implanted   IMP SCR BONE STRK TORX 6.5X30MM CAN 0803-4979-1 - [id number] Metallic Hardware/[place] IMP SCR BONE STRK TORX 6.5X30MM CAN [id number]  [employer] [id number] Right 1 Implanted   INSERT ACE 36MM 0D F HIP X3 TRDNT 723-00-36F - [id number] Total Joint Component/Insert INSERT ACE 36MM 0D F HIP X3 TRDNT 723-00-36F  [employer] [id number] Right 1 Implanted   IMPLANT HIP STM 105MM NK 38.5MM INSG 5 HI STM HI OFST STRL - [id number] Total Joint Component/Insert IMPLANT HIP STM 105MM NK 38.5MM INSG 5 HI STM HI OFST STRL  [employer] [place] [id number] Right 1 Implanted   IMP HEAD FEMORAL STRK BIOLOX DELTA CERAMIC 36MM +0MM - [id number] Total Joint Component/Insert IMP HEAD FEMORAL STRK BIOLOX DELTA CERAMIC 36MM +0MM  [employer] [id number] Right 1 Implanted

## 2023-06-15 NOTE — OP NOTE
[facility]  Operative Note    Direct Anterior Approach Total Hip Arthroplasty       Harrison Thomas MRN# [id number]   YOB: 1945  Procedure Date:  6/15/2023  Age: 77 year old       PREOPERATIVE DIAGNOSIS:  Femoral neck fracture, right hip.    POSTOPERATIVE DIAGNOSIS:  Femoral neck fracture,right hip.    PROCEDURE PERFORMED:    Direct anterior approach right total hip arthroplasty.      SURGEON:  Alexandro Lazaro M.D.    FIRST ASSISTANT:  Stevie Cool PA-C.  His assistance was critical for positioning, retraction during exposure and implantation as well as closure.  His assistance allowed me to operate efficiently, decreasing surgical time and risk.     ANESTHESIA:  General    EBL: 200    IMPLANTS: [place] Insignia size 5 high offset stem, size 54 Trident PSL I cup with neutral X3 F liner, and Biolox +0 36 mm head    FINDINGS: Femoral neck fracture     INDICATIONS:    Harrison hTomas is a 77 year old-year-old male with a displaced femoral neck fracture.  Community Ambulator.  Discussed both operative and nonoperative management.  Risks of surgery discussed included but not limited to bleeding, infection, damage to surrounding neurovascular, thigh numbness, leg length difference, dislocation, fracture, need for revision surgery, blood clots, pulmonary embolus, stroke, anesthetic complications and even death.  No guarantees given or implied. Patient thoughtfully acknowledges risks and wishes to proceed.        DESCRIPTION OF PROCEDURE:  The patient was identified in the preoperative area per hospital policy, correct operative site marked, to the OR, Ancef and tranexamic acid given.  General anesthesia induced.  Transferred onto the Juda table.  Positioned appropriately with all bony prominences well padded, chlorhexidine prescrub, ChloraPrep, standard drape, timeout performed per WHO protocol, correctly identifying the patient, operative site, and procedure to be performed.  All in  the room agreed.        Using bony landmarks, I prepared the incision for direct anterior approach, dissected through skin and subcutaneous tissue, identifying and incising fascia, tensor fascia itzel, retracted the muscle bluntly.  Used thyroid ligature to cauterize the circumflex vessels which were carefully identified.  Exposed the femoral neck, incised and tagged the capsule, performing medial release to lesser trochanter,  identified the femoral neck fracture and using a broach as a template, made a cut on the femoral neck approximately 15 mm from the lesser trochanter.  Femoral head removed with cork screw.  Exposed the acetabulum, removed the labrum, and performed superior release between posterior capsule and labrum.  Capsule released into piriformis fossa.  Under fluoroscopy, reamed up sequentially from a 47 to a size 55. Under fluoroscopy, impacted a 54 mm cup in approximately 20 degrees of anteversion and 40 degrees of abduction.  Did directly visualize good bleeding bone prior to this. The cup sat well, rocked the pelvis with movement.  A neutral liner was impacted to accommodate 36 mm head.       Turned attention to the femur, externally rotating, performing femoral releases, placed the hip in extension and adduction.  Used a box osteotome and a lateralizer, broached up sequentially, first reducing and checking with a size 5 stem in place. This showed appropriate leg lengths and good fit of the stem.       Thoroughly irrigated the hip and placed the actual stem, high offset neck length.  Trialed and then selected a +0 36 mm Biolox ceramic head which was impacted after cleaning and drying the Nolen taper.  This restored the leg lengths.   Stem filled canal.  It was stable to external pull with a bone hook and neutral and 90 degrees.  Performed a Rush Betadine lavage protocol, thoroughly irrigated hip, vancomycin deep, repaired the hip capsule. Repaired the tensor fascia itzel with 0 Vicryl, 0 Vicryl fatty  layer, 2-0 Vicryl subcutaneous, 4-0 Monocryl subcuticular, Dermabond and sterile dressings applied.   Blood loss was 300.  Sponge and needle counts were correct. Transferred safely off the [place] table, stable to PACU.     POSTOPERATIVE PLAN:   1.  Weightbearing as tolerated. Consider a walker for the first 2 weeks.   2.  Anterior hip precautions.  No abduction pillow necessary.   3.  Physical therapy.   4.  DVT prophylaxis with 325 mg aspirin enteric-coated p.o. daily x 6 weeks.    5.  Ancef x 24 hours.   6.  PACU x-rays, AP pelvis, 2 views of the right hip.   7.  Follow up with primary care provider for osteoporosis workup and treatment.   8.  Follow up with Dr. Lazaro in 2 weeks for wound check. Keep dressing in place until then.  Okay to shower.  No submerging the wound.   9.  Follow up with Dr. Lazaro in 8 weeks with 2 views of the right hip.    Alexandro Lazaro M.D.

## 2023-06-15 NOTE — PROGRESS NOTES
06/15/23 1003   Appointment Info   Signing Clinician's Name / Credentials (PT) Javier Chávez, PT, DPT   Rehab Comments (PT) WBAT RLE, no hip hyperextension, no hip external rotation   Living Environment   People in Home spouse   Current Living Arrangements house  ([place])   Home Accessibility stairs to enter home   Number of Stairs, Main Entrance 1   Stair Railings, Main Entrance none   Transportation Anticipated car, drives self;family or friend will provide   Living Environment Comments Patient lives with spouse in [place], 1 step to enter   Self-Care   Usual Activity Tolerance moderate   Current Activity Tolerance fair   Equipment Currently Used at Home other (see comments)  (walking stick)   Fall history within last six months yes   Number of times patient has fallen within last six months 5   Activity/Exercise/Self-Care Comment Patient IND at baseline with mobility and cares with use of walking stick.   General Information   Onset of Illness/Injury or Date of Surgery 06/12/23   Referring Physician Stevie Cool PA-C   Patient/Family Therapy Goals Statement (PT) Family would like discharge to TCU   Pertinent History of Current Problem (include personal factors and/or comorbidities that impact the POC) 77 year old male with medical history significant for COPD, HTN, HL, hypothyroidism was brought to the ER for evaluation after a fall and is admitted on 6/12/2023 for management of traumatic SAH and right femoral neck fracture. s/p R LIZABETH POD #1   Existing Precautions/Restrictions fall;no hip hyperextension;no pivoting or twisting;no hip ER;oxygen therapy device and L/min   Weight-Bearing Status - RLE weight-bearing as tolerated   General Observations 2L supplemental O2 via NC   Cognition   Affect/Mental Status (Cognition) WFL   Orientation Status (Cognition) oriented x 4   Follows Commands (Cognition) WFL   Pain Assessment   Patient Currently in Pain Yes, see Vital Sign flowsheet   Range of Motion (ROM)   ROM  Comment post-surgical ROM limitations RLE   Strength (Manual Muscle Testing)   Strength Comments post-surgical strength limitations RLE   Bed Mobility   Comment, (Bed Mobility) supine to sit with CGA and HOB elevated   Transfers   Comment, (Transfers) sit<>stand with FWW and min assist x1   Gait/Stairs (Locomotion)   Distance in Feet (Gait) 20'   Comment, (Gait/Stairs) 3' ambulation with FWW and min assist x1.   Balance   Balance Comments impaired balance secondary to reduce strength and sensation in LLE   Sensory Examination   Sensory Perception Comments impaired sensation in LLE   Clinical Impression   Criteria for Skilled Therapeutic Intervention Yes, treatment indicated   PT Diagnosis (PT) impaired mobility   Influenced by the following impairments post-surgical strength and ROM limitations, impaired sensation LLE, impaired balance, increased pain levels, impaired cardiopulmonary function   Functional limitations due to impairments impaired functional mobility, impaired gait, transfers, bed mobility, stair navigation   Clinical Presentation (PT Evaluation Complexity) Stable/Uncomplicated   Clinical Presentation Rationale clinical judgement   Clinical Decision Making (Complexity) low complexity   Planned Therapy Interventions (PT) balance training;bed mobility training;gait training;stair training;strengthening;transfer training;progressive activity/exercise   Anticipated Equipment Needs at Discharge (PT) walker, rolling   Risk & Benefits of therapy have been explained evaluation/treatment results reviewed;care plan/treatment goals reviewed;risks/benefits reviewed;current/potential barriers reviewed;participants voiced agreement with care plan;participants included;patient   PT Total Evaluation Time   PT Eval, Low Complexity Minutes (09866) 11   Physical Therapy Goals   PT Frequency 2x/day   PT Predicted Duration/Target Date for Goal Attainment 06/22/23   PT Goals Bed Mobility;Transfers;Gait;Stairs   PT: Bed  Mobility Supervision/stand-by assist;Supine to/from sit   PT: Transfers Supervision/stand-by assist;Sit to/from stand;Assistive device   PT: Gait Supervision/stand-by assist;150 feet;Rolling walker   PT: Stairs Supervision/stand-by assist;1 stair   Interventions   Interventions Quick Adds Gait Training;Therapeutic Activity;Therapeutic Procedure   Therapeutic Procedure/Exercise   Ther. Procedure: strength, endurance, ROM, flexibillity Minutes (94979) 9   Symptoms Noted During/After Treatment fatigue;increased pain   Treatment Detail/Skilled Intervention Patient performing LE exercises while supine in bed from LIZABETH handout x5-8e including: ankle pumps, quad sets, glute sets, heel slides, lying kicks. Verbal and tactile cues utilized throughout to ensure proper form and technique.   Therapeutic Activity   Therapeutic Activities: dynamic activities to improve functional performance Minutes (84106) 23   Symptoms Noted During/After Treatment Fatigue;Increased pain   Treatment Detail/Skilled Intervention Patient supine in bed at beginning of session, agreeable to participate in PT. Spouse present throughout session. Educated patient on hip precautions and WBAT status to RLE. Educated on post-surgical pain and pain management via regular icing and pain meds. Spouse reporting current issues with LBP and is leaving for wedding this soon, therefore, unable to care for patient at home. Patient and spouse asking about TCU for discharge. Patient on 2L supplemental O2 via NC throughout session, attempted to decrease to RA but patient dropping into 80's. Kept on 2L supplemental O2 with activity, SpO2 dropping to 80% following bout of ambulation and requiring ~2 minutes to reach 90%. Noted shallow breathing during/following ambulation. Cues for pursed lip breathing. Patient performing supine to sit transfer with CGA and increased time to complete. HOB elevated and use of bedrail throughout. Patient performing sit<>stand with initial  min assist x1 and FWW, quickly transitioning hands from bed to walker resulting in instability. Cues for control and hand placement, improvement noted and progressed to CGA with sit<>stands. Seated in recliner at end of session with call light next to him and chair alarm on. Discussed discharge recommendations and patient/spouse agreeable. Time during session for line management, room set-up, and monitoring vitals.   Gait Training   Gait Training Minutes (22879) 9   Symptoms Noted During/After Treatment (Gait Training) increased pain;fatigue;shortness of breath   Treatment Detail/Skilled Intervention Patient completing bout of ambulation for 20' with FWW and min assist x1. Instability noted throughout, increased pain levels. Patient with decreased step length and gait velocity. Step to pattern for most of bout. Cues for upright posture and walker in closer proximity to body. Cues for maintaining hip precautions. No overt LOB throughout. Cues for control and walker close to body when turning.   PT Discharge Planning   PT Plan progress gait distance, repeated sit<>stands, initiate stair training when able, review and progress LE exercises   PT Discharge Recommendation (DC Rec) Transitional Care Facility   PT Rationale for DC Rec Patient well below baseline functional mobility. Presents with post-surgical strength and ROM limitations, increased pain levels, and impaired cardiopulmonary function resulting in the need for assist x1, FWW, and supplemental O2. Spouse currently reporting inability to adequately assist patient mobility and cares at home. Recommend discharge to TCU for improvement of strength, activity tolerance, safety, and independence with functional mobility prior to returning home.   PT Brief overview of current status min assist x1 sit<>stand and ambulating short distance with FWW   Total Session Time   Timed Code Treatment Minutes 41   Total Session Time (sum of timed and untimed services) 52

## 2023-06-15 NOTE — PROGRESS NOTES
RT NOTE     Pt perform NIF and VC with good effort. -40 for NIF and 1380 ml for VC.     Thao Sargent, RT

## 2023-06-16 ENCOUNTER — APPOINTMENT (OUTPATIENT)
Dept: PHYSICAL THERAPY | Facility: CLINIC | Age: 78
DRG: 956 | End: 2023-06-16
Payer: MEDICARE

## 2023-06-16 ENCOUNTER — APPOINTMENT (OUTPATIENT)
Dept: GENERAL RADIOLOGY | Facility: CLINIC | Age: 78
DRG: 956 | End: 2023-06-16
Attending: PHYSICIAN ASSISTANT
Payer: MEDICARE

## 2023-06-16 LAB
ANION GAP SERPL CALCULATED.3IONS-SCNC: 7 MMOL/L (ref 7–15)
BUN SERPL-MCNC: 14.3 MG/DL (ref 8–23)
CALCIUM SERPL-MCNC: 8.3 MG/DL (ref 8.8–10.2)
CHLORIDE SERPL-SCNC: 106 MMOL/L (ref 98–107)
CREAT SERPL-MCNC: 0.72 MG/DL (ref 0.67–1.17)
DEPRECATED HCO3 PLAS-SCNC: 27 MMOL/L (ref 22–29)
GFR SERPL CREATININE-BSD FRML MDRD: >90 ML/MIN/1.73M2
GLUCOSE BLDC GLUCOMTR-MCNC: 107 MG/DL (ref 70–99)
GLUCOSE BLDC GLUCOMTR-MCNC: 115 MG/DL (ref 70–99)
GLUCOSE BLDC GLUCOMTR-MCNC: 151 MG/DL (ref 70–99)
GLUCOSE BLDC GLUCOMTR-MCNC: 96 MG/DL (ref 70–99)
GLUCOSE BLDC GLUCOMTR-MCNC: 96 MG/DL (ref 70–99)
GLUCOSE SERPL-MCNC: 89 MG/DL (ref 70–99)
HGB BLD-MCNC: 10.5 G/DL (ref 13.3–17.7)
MAGNESIUM SERPL-MCNC: 2 MG/DL (ref 1.7–2.3)
PHOSPHATE SERPL-MCNC: 2.1 MG/DL (ref 2.5–4.5)
POTASSIUM SERPL-SCNC: 4.2 MMOL/L (ref 3.4–5.3)
SODIUM SERPL-SCNC: 140 MMOL/L (ref 136–145)

## 2023-06-16 PROCEDURE — 250N000013 HC RX MED GY IP 250 OP 250 PS 637: Performed by: PHYSICIAN ASSISTANT

## 2023-06-16 PROCEDURE — 71045 X-RAY EXAM CHEST 1 VIEW: CPT

## 2023-06-16 PROCEDURE — 83735 ASSAY OF MAGNESIUM: CPT | Performed by: PHYSICIAN ASSISTANT

## 2023-06-16 PROCEDURE — 97530 THERAPEUTIC ACTIVITIES: CPT | Mod: GP | Performed by: PHYSICAL THERAPIST

## 2023-06-16 PROCEDURE — 84100 ASSAY OF PHOSPHORUS: CPT | Performed by: PHYSICIAN ASSISTANT

## 2023-06-16 PROCEDURE — 97110 THERAPEUTIC EXERCISES: CPT | Mod: GP | Performed by: PHYSICAL THERAPIST

## 2023-06-16 PROCEDURE — 999N000157 HC STATISTIC RCP TIME EA 10 MIN

## 2023-06-16 PROCEDURE — 94150 VITAL CAPACITY TEST: CPT

## 2023-06-16 PROCEDURE — 250N000012 HC RX MED GY IP 250 OP 636 PS 637: Performed by: PHYSICIAN ASSISTANT

## 2023-06-16 PROCEDURE — 120N000001 HC R&B MED SURG/OB

## 2023-06-16 PROCEDURE — 36415 COLL VENOUS BLD VENIPUNCTURE: CPT | Performed by: PHYSICIAN ASSISTANT

## 2023-06-16 PROCEDURE — 99232 SBSQ HOSP IP/OBS MODERATE 35: CPT | Performed by: STUDENT IN AN ORGANIZED HEALTH CARE EDUCATION/TRAINING PROGRAM

## 2023-06-16 PROCEDURE — 250N000013 HC RX MED GY IP 250 OP 250 PS 637: Performed by: HOSPITALIST

## 2023-06-16 PROCEDURE — 80048 BASIC METABOLIC PNL TOTAL CA: CPT | Performed by: PHYSICIAN ASSISTANT

## 2023-06-16 PROCEDURE — 85018 HEMOGLOBIN: CPT | Performed by: PHYSICIAN ASSISTANT

## 2023-06-16 PROCEDURE — 999N000111 HC STATISTIC OT IP EVAL DEFER: Performed by: OCCUPATIONAL THERAPIST

## 2023-06-16 RX ORDER — OXYCODONE HYDROCHLORIDE 5 MG/1
5 TABLET ORAL EVERY 4 HOURS PRN
Qty: 25 TABLET | Refills: 0 | Status: SHIPPED | OUTPATIENT
Start: 2023-06-16 | End: 2023-06-16

## 2023-06-16 RX ORDER — HYDROXYZINE HYDROCHLORIDE 10 MG/1
10 TABLET, FILM COATED ORAL EVERY 6 HOURS PRN
Qty: 20 TABLET | Refills: 0 | DISCHARGE
Start: 2023-06-16 | End: 2023-06-19

## 2023-06-16 RX ORDER — HYDROMORPHONE HYDROCHLORIDE 2 MG/1
2 TABLET ORAL
Qty: 25 TABLET | Refills: 0 | Status: SHIPPED | OUTPATIENT
Start: 2023-06-16 | End: 2023-06-22

## 2023-06-16 RX ORDER — HYDROMORPHONE HYDROCHLORIDE 2 MG/1
2 TABLET ORAL
Status: DISCONTINUED | OUTPATIENT
Start: 2023-06-16 | End: 2023-06-17 | Stop reason: HOSPADM

## 2023-06-16 RX ORDER — METHOCARBAMOL 500 MG/1
500 TABLET, FILM COATED ORAL EVERY 6 HOURS PRN
Qty: 20 TABLET | Refills: 0 | DISCHARGE
Start: 2023-06-16 | End: 2023-06-19

## 2023-06-16 RX ORDER — ACETAMINOPHEN 325 MG/1
975 TABLET ORAL EVERY 8 HOURS PRN
Qty: 100 TABLET | Refills: 0 | DISCHARGE
Start: 2023-06-16 | End: 2023-06-19

## 2023-06-16 RX ORDER — HYDROMORPHONE HYDROCHLORIDE 2 MG/1
4 TABLET ORAL
Status: DISCONTINUED | OUTPATIENT
Start: 2023-06-16 | End: 2023-06-17 | Stop reason: HOSPADM

## 2023-06-16 RX ORDER — HYDROXYZINE HYDROCHLORIDE 25 MG/1
25 TABLET, FILM COATED ORAL EVERY 6 HOURS PRN
Status: DISCONTINUED | OUTPATIENT
Start: 2023-06-16 | End: 2023-06-17 | Stop reason: HOSPADM

## 2023-06-16 RX ORDER — HYDROXYZINE HYDROCHLORIDE 25 MG/1
50 TABLET, FILM COATED ORAL EVERY 6 HOURS PRN
Status: DISCONTINUED | OUTPATIENT
Start: 2023-06-16 | End: 2023-06-17 | Stop reason: HOSPADM

## 2023-06-16 RX ORDER — ASPIRIN 325 MG
325 TABLET, DELAYED RELEASE (ENTERIC COATED) ORAL DAILY
Qty: 42 TABLET | Refills: 0 | DISCHARGE
Start: 2023-06-16 | End: 2023-07-13

## 2023-06-16 RX ADMIN — POTASSIUM & SODIUM PHOSPHATES POWDER PACK 280-160-250 MG 1 PACKET: 280-160-250 PACK at 18:21

## 2023-06-16 RX ADMIN — PREDNISONE 2 MG: 1 TABLET ORAL at 08:49

## 2023-06-16 RX ADMIN — LEVOTHYROXINE SODIUM 137 MCG: 137 TABLET ORAL at 08:48

## 2023-06-16 RX ADMIN — HYDROXYZINE HYDROCHLORIDE 10 MG: 10 TABLET ORAL at 01:50

## 2023-06-16 RX ADMIN — ROSUVASTATIN CALCIUM 10 MG: 10 TABLET, FILM COATED ORAL at 20:35

## 2023-06-16 RX ADMIN — HYDROXYZINE HYDROCHLORIDE 25 MG: 25 TABLET, FILM COATED ORAL at 12:04

## 2023-06-16 RX ADMIN — Medication 25 MCG: at 08:52

## 2023-06-16 RX ADMIN — GABAPENTIN 300 MG: 300 CAPSULE ORAL at 08:47

## 2023-06-16 RX ADMIN — DOCUSATE SODIUM 100 MG: 100 CAPSULE, LIQUID FILLED ORAL at 08:49

## 2023-06-16 RX ADMIN — LIDOCAINE 1 PATCH: 560 PATCH PERCUTANEOUS; TOPICAL; TRANSDERMAL at 20:37

## 2023-06-16 RX ADMIN — Medication 600 MG: at 08:48

## 2023-06-16 RX ADMIN — Medication 50 MCG: at 08:48

## 2023-06-16 RX ADMIN — HYDROMORPHONE HYDROCHLORIDE 4 MG: 2 TABLET ORAL at 12:05

## 2023-06-16 RX ADMIN — OXYCODONE HYDROCHLORIDE 10 MG: 5 TABLET ORAL at 08:49

## 2023-06-16 RX ADMIN — ACETAMINOPHEN 975 MG: 325 TABLET ORAL at 08:47

## 2023-06-16 RX ADMIN — ASPIRIN 325 MG: 325 TABLET, COATED ORAL at 08:49

## 2023-06-16 RX ADMIN — INSULIN ASPART 1 UNITS: 100 INJECTION, SOLUTION INTRAVENOUS; SUBCUTANEOUS at 12:36

## 2023-06-16 RX ADMIN — MULTIPLE VITAMINS W/ MINERALS TAB 1 TABLET: TAB at 08:49

## 2023-06-16 RX ADMIN — DOCUSATE SODIUM 100 MG: 100 CAPSULE, LIQUID FILLED ORAL at 20:35

## 2023-06-16 RX ADMIN — ACETAMINOPHEN 975 MG: 325 TABLET ORAL at 15:38

## 2023-06-16 RX ADMIN — POTASSIUM & SODIUM PHOSPHATES POWDER PACK 280-160-250 MG 1 PACKET: 280-160-250 PACK at 21:56

## 2023-06-16 RX ADMIN — HYDROMORPHONE HYDROCHLORIDE 4 MG: 2 TABLET ORAL at 15:39

## 2023-06-16 RX ADMIN — HYDROMORPHONE HYDROCHLORIDE 4 MG: 2 TABLET ORAL at 20:35

## 2023-06-16 RX ADMIN — DULOXETINE HYDROCHLORIDE 20 MG: 20 CAPSULE, DELAYED RELEASE ORAL at 21:55

## 2023-06-16 RX ADMIN — METOPROLOL SUCCINATE 50 MG: 50 TABLET, EXTENDED RELEASE ORAL at 20:35

## 2023-06-16 RX ADMIN — GABAPENTIN 300 MG: 300 CAPSULE ORAL at 15:38

## 2023-06-16 RX ADMIN — GABAPENTIN 300 MG: 300 CAPSULE ORAL at 21:55

## 2023-06-16 RX ADMIN — AMLODIPINE BESYLATE 5 MG: 5 TABLET ORAL at 21:55

## 2023-06-16 ASSESSMENT — ACTIVITIES OF DAILY LIVING (ADL)
ADLS_ACUITY_SCORE: 24

## 2023-06-16 NOTE — PLAN OF CARE
Goal Outcome Evaluation:      Diagnosis: R LIZABETH, Rib fractures, R eyebrow stitches  POD#: 2  Mental Status: A&Ox4  Activity/dangle up 1 GB walker  Diet: reg  Pain: Oxycodone, atarax, Iv dilaudid x1 for breakthrough pain  Mir/Voiding: Urinal  Tele/Restraints/Iso:   02/LDA: 2L O2, Iv SL  D/C Date: pending  Other Info: dressing CDI. Cms intact. Blood sugar checks

## 2023-06-16 NOTE — PLAN OF CARE
Occupational Therapy: Orders received. Chart reviewed and discussed with care team.? Occupational Therapy not indicated due to IP therapy needs being met by PT while in IP. Plan for pt to discharge to TCU today or within the next couple days.? Defer discharge recommendations to PT and ortho team, defer further skilled OT to next level of care.? Will complete orders.

## 2023-06-16 NOTE — PROGRESS NOTES
[facility]    Hospitalist Progress Note    Interval History     Patient awake and alert.  Sitting up in chair.  Reports discomfort with breathing- he just finished working with PT and is winded. Hip pain is controlled. Nervous about going to TCU tomorrow    -Data reviewed today: I reviewed all new labs and imaging results over the last 24 hours. I personally reviewed the chest x-ray image(s) showing Multiple rib fractures.    Physical Exam   Temp: 97.9  F (36.6  C) Temp src: Oral BP: 136/61 Pulse: 61   Resp: 16 SpO2: 95 % O2 Device: Nasal cannula Oxygen Delivery: 2 LPM  Vitals:    06/12/23 1240 06/13/23 0714 06/14/23 0618   Weight: 74.8 kg (165 lb) (!) 169.4 kg (373 lb 7.4 oz) 78.9 kg (173 lb 15.1 oz)     Vital Signs with Ranges  Temp:  [97.8  F (36.6  C)-99.4  F (37.4  C)] 97.9  F (36.6  C)  Pulse:  [61-80] 61  Resp:  [16] 16  BP: (134-142)/(57-63) 136/61  SpO2:  [88 %-95 %] 95 %  I/O last 3 completed shifts:  In: 150 [P.O.:150]  Out: 950 [Urine:950]    Physical Exam  Constitutional:       Appearance: Normal appearance.   HENT:      Head: Normocephalic.   Eyes:      Pupils: Pupils are equal, round, and reactive to light.   Cardiovascular:      Rate and Rhythm: Normal rate and regular rhythm.      Pulses: Normal pulses.      Heart sounds: Normal heart sounds.   Pulmonary:      Effort: Pulmonary effort is normal. No respiratory distress.      Breath sounds: Normal breath sounds.   Abdominal:      General: Abdomen is flat. Bowel sounds are normal. There is no distension.      Tenderness: There is no abdominal tenderness. There is no guarding.   Musculoskeletal:      Cervical back: Normal range of motion.      Comments: Right hip surgical site appears stable.   Skin:     General: Skin is warm and dry.      Comments: Bruising around the right eye.  Sutures about the right eye with dressing in place   Neurological:      General: No focal deficit present.           Medications     sodium chloride  75 mL/hr at 06/14/23 1853       acetaminophen  975 mg Oral Q8H     amLODIPine  5 mg Oral At Bedtime     aspirin  325 mg Oral Daily     azithromycin  500 mg Oral Every Other Day     calcium carbonate  600 mg Oral Daily     docusate sodium  100 mg Oral BID     DULoxetine  20 mg Oral At Bedtime     Fluticasone-Umeclidin-Vilant  1 puff Inhalation Daily     gabapentin  300 mg Oral TID     insulin aspart  1-3 Units Subcutaneous TID AC     insulin aspart  1-3 Units Subcutaneous At Bedtime     levothyroxine  137 mcg Oral Daily     lidocaine  1 patch Transdermal Q24H     lidocaine   Transdermal Q8H JULIETA     metoprolol succinate ER  50 mg Oral QPM     multivitamin w/minerals  1 tablet Oral Daily     predniSONE  2 mg Oral Daily     rosuvastatin  10 mg Oral QPM     sodium chloride (PF)  3 mL Intracatheter Q8H     Vitamin D3  25 mcg Oral Daily     cholecalciferol  50 mcg Oral Daily       Data   Recent Labs   Lab 06/16/23  0707 06/16/23  0621 06/16/23  0158 06/15/23  1302 06/15/23  0742 06/14/23  2208 06/14/23  0727 06/13/23  0654 06/13/23  0220 06/12/23  1407   WBC  --   --   --   --   --   --   --   --   --  10.1   HGB 10.5*  --   --   --  11.5*  --   --  13.5  --  14.0   MCV  --   --   --   --   --   --   --   --   --  91   PLT  --   --   --   --   --   --   --   --   --  194   INR  --   --   --   --   --   --   --  1.09  --   --      --   --   --  138  --  138 137  --  140   POTASSIUM 4.2  --   --   --  4.2  --  3.7 4.2  --  4.3   CHLORIDE 106  --   --   --  104  --  102 102  --  102   CO2 27  --   --   --  24  --  26 25  --  28   BUN 14.3  --   --   --  15.0  --  12.9 11.9  --  13.3   CR 0.72  --   --   --  0.77  --  0.82 0.73  --  0.90   ANIONGAP 7  --   --   --  10  --  10 10  --  10   KARLIE 8.3*  --   --   --  8.3*  --  8.8 8.9  --  9.0   GLC 89 96 107*   < > 176*   < > 88 101*   < > 90    < > = values in this interval not displayed.       No results found for this or any previous visit (from the past 24  hour(s)).      Assessment & Plan      Harrison Thomas is a 77 year old male with medical history significant for COPD, HTN, HL, hypothyroidism was brought to the ER for evaluation after a fall and is admitted on 6/12/2023 for management of traumatic SAH and right femoral neck fracture.      Traumatic small volume SAH  Skin tear forehead  CT head showed small volume subarachnoid hemorrhage at the anterolateral right frontal lobe and scalp hematoma.  -Neurosurgery consulted from ER, head CT repeated at 1:34 AM on 6/13 showed mild increase in acute subarachnoid hemorrhage in the right frontal lobe  -Head CT was again repeated 12:22 PM on 6/13/2023.  No significant interval change in acute intraparenchymal hematoma in the right middle frontal gyrus compared to previous exam with small adjacent right frontal subarachnoid hemorrhage being stable.  -At this point neurosurgery plans for follow-up head CT in clinic in 1 month.  -Per neurosurgery can initiate VTE prophylaxis on 6/15/2023.  Started on aspirin 325 mg daily.  -BP control, RN hydralazine ordered for SBP >140  -Neuro check every 4 hours  -Telemetry  -Fall and seizure precaution, defer seizure ppx to neurosurgery.  -skin tear Rt supraorbital area sutured with absorbable sutures.     Right femoral neck fracture  - Rt should XR as well given pain  - Pain control-scheduled Tylenol and gabapentin, as needed oxycodone and IV Dilaudid.  - Orthopedic surgery consulted, needs surgery but risk of DVT post op as he will not be able to resume prophylactic AC. Defer elective IVC filter to ortho if deemed high risk post op unless cleared for AC by neurosurgery.   -RCRI -1 risk factor given CAD but no active anginal symptoms. No further work up recommended prior to surgery and Overall 1% risk of correct of cardiac death, nonfatal MI and nonfatal cardiac arrest. Given SAH, further risk stratification based on recommendation from neurosurgery as well.  -Underwent right total  hip arthroplasty on 6/14/2023.  -PT OT-->TCU.  Social work consulted.  - per neurosurgey dvt prophylaxis can be started from 6/15.  Started on aspirin 325 mg daily.     Mechanical fall   right-sided rib 6-9 mildly displaced fractures  Reports his Left leg natalie up sometime. Was admitted in hospital 2 months ago for fall and Rib fracture. Fell 3 weeks ago as well but no new injuries sustained.  - xray rib fx protocol done today showed Mildly displaced left lateral/anterolateral sixth through ninth rib  fractures. No pneumothorax  - PT eval post op  - fall precautions  - rib fracture protocol ordered  -Vital capacity/NIF ordered.  -40 for neph and 1380 mL for vital capacity today.  -On Tylenol, oxycodone, Robaxin, lidocaine and for pain management  -Incentive spirometry encouraged  -We will monitor for respiratory decompensation.  Patient currently on nasal cannula 2 L.  - trauma surgery consulted .  They agreed with current rib fracture protocol with no further new recommendations.     COPD with chronic hypoxemic resp failure, on 1LPM NC O2  BRENNEN, non complainat with CPAP  RUL mass, s/p radiation   -continue PTA inhalers  -CPAP ordered while here, discussed with patient to use  -on chronic prednisone and azithromycin, continue. Monitor for BP perioperatively closely. Stress dose steroid if needed.  -He follows up with Dr. Dumont and Pulmonology for probable lung cancer with possible radiation pneumonitis. He has been noted with RUL mass like lesion (PET positive). He underwent VATS biopsy (02/2022) by Dr Dumont but was noted to be incomplete due to a lot of scar tissue. He subsequently underwent radiation therapy for enlarging right upper lobe density.      HTN, HL  CAD  Circumflex stent in 1997, angiogram in 2005 showed moderate LAD and diagonal disease on medical management.  No anginal symptoms reported currently.  -resume PTA regimen including Norvasc, Toprol-XL and rosuvastatin.  -Started on aspirin 325 mg  daily on 6/15/2023.     Hypothyroidism  History of papillary thyroid cancer s/p thyroidectomy  Resume PTA Synthroid    Hyperglycemia-patient had couple of episodes of hypoglycemia postoperatively.  HbA1c pending.  Will place on sliding scale insulin aspart.     Diet: diet advanced .  DVT Prophylaxis: Pneumatic Compression Devices  Mir Catheter: Not present  Lines: None     Cardiac Monitoring: None  Code Status:  DNR/DNI  Anticipate discharge to TCU tomorrow    Clinically Significant Risk Factors                  # Hypertension: Noted on problem list                    Vika Sewell MD, MD  Available on Piki

## 2023-06-16 NOTE — PROGRESS NOTES
Brief Orthopedic Progress Note    I attempted to visit with the patient at 1002 today but he was sound asleep. I spoke with the patient's RN who states that pain control has been suboptimal and patient/family were curious if trialing PO Dilaudid rather than oxycodone would be reasonable. Furthermore, it is noted that the patient has not been receiving pain medication overnight. At this time, I have adjusted the patient's pain regimen by holding oxycodone, adding Dilaudid, and increasing hydroxyzine dose. Possible discharge to TCU today. Okay to discharge from an orthopedic standpoint as patient has not required IV pain medication today, Hgb stable, and progressing with PT/OT as long as otherwise medically cleared. Discharge orders/meds updated. Please contact ortho trauma team if any questions or concerns arise.    Elvira Ulloa PA-C  [facility]

## 2023-06-16 NOTE — PROGRESS NOTES
A &O x 4, tolerated meals well, oxycodone changed to dilaudid po for pain.  Xray done today and close monitoring continue. Assist of one with gait belt and walker, on O2 at 1 to 2 liters. Management continue.

## 2023-06-16 NOTE — PROGRESS NOTES
Care Management Follow Up    Length of Stay (days): 4    Expected Discharge Date: 06/17/2023     Concerns to be Addressed:       Patient plan of care discussed at interdisciplinary rounds: Yes    Anticipated Discharge Disposition: Skilled Nursing Facility     Anticipated Discharge Services:    Anticipated Discharge DME:      Patient/family educated on Medicare website which has current facility and service quality ratings:    Education Provided on the Discharge Plan:    Patient/Family in Agreement with the Plan:      Referrals Placed by CM/SW:    Private pay costs discussed: Not applicable    Additional Information:  [facility] called and can accept patient into a shared room with no fee today. Writer to discuss with patient and set up transportation.      [facility] accepted into a private room with no fee.     Patient updated. He will talk to his wife and SW will follow up in a bit for his choice.    1400: Writer spoke to patient's wife Adeola to confirm plan. She is anxious about patient discharging over the weekend as the last time he was sent to a TCU there were issues getting his prescriptions and pain medications. Writer discussed [facility] vs [facility] and private room fees. Adeola confirmed that they would like [facility]. Writer discussed oxygen and Adeola will bring a tank for the transport to [facility]. She would like to transport via vehicle at 1300. Writer confirmed this plan with Dr. Sewell who is in agreement. Adeola would like a confirmation call in the morning. [facility] updated. PAS completed.    PAS-RR    D: Per DHS regulation, JARAD completed and submitted PAS-RR to [place] [employer] Direct [employer] via the [employer] Line.  PAS-RR confirmation # is : [id number]    I: SW spoke with patient and they are aware a PAS-RR has been submitted.  JARAD reviewed with patient that they may be contacted for a follow up appointment within 10 days of hospital discharge if their SNF stay is < 30 days.  Contact information for Senior  Link[employer] Line was also provided.    A: patient verbalized understanding.    P: Further questions may be directed to Senior LinkAge Line at #[phone], option #4 for [facility]- staff.        JUANA Pimentel

## 2023-06-17 ENCOUNTER — APPOINTMENT (OUTPATIENT)
Dept: GENERAL RADIOLOGY | Facility: CLINIC | Age: 78
DRG: 956 | End: 2023-06-17
Attending: PHYSICIAN ASSISTANT
Payer: MEDICARE

## 2023-06-17 ENCOUNTER — HEALTH MAINTENANCE LETTER (OUTPATIENT)
Age: 78
End: 2023-06-17

## 2023-06-17 ENCOUNTER — APPOINTMENT (OUTPATIENT)
Dept: PHYSICAL THERAPY | Facility: CLINIC | Age: 78
DRG: 956 | End: 2023-06-17
Payer: MEDICARE

## 2023-06-17 VITALS
BODY MASS INDEX: 25.31 KG/M2 | WEIGHT: 176.81 LBS | DIASTOLIC BLOOD PRESSURE: 58 MMHG | HEART RATE: 68 BPM | HEIGHT: 70 IN | OXYGEN SATURATION: 94 % | TEMPERATURE: 99.4 F | RESPIRATION RATE: 18 BRPM | SYSTOLIC BLOOD PRESSURE: 148 MMHG

## 2023-06-17 LAB
ANION GAP SERPL CALCULATED.3IONS-SCNC: 7 MMOL/L (ref 7–15)
BUN SERPL-MCNC: 11.6 MG/DL (ref 8–23)
CALCIUM SERPL-MCNC: 8.6 MG/DL (ref 8.8–10.2)
CHLORIDE SERPL-SCNC: 103 MMOL/L (ref 98–107)
CREAT SERPL-MCNC: 0.67 MG/DL (ref 0.67–1.17)
DEPRECATED HCO3 PLAS-SCNC: 28 MMOL/L (ref 22–29)
GFR SERPL CREATININE-BSD FRML MDRD: >90 ML/MIN/1.73M2
GLUCOSE BLDC GLUCOMTR-MCNC: 167 MG/DL (ref 70–99)
GLUCOSE BLDC GLUCOMTR-MCNC: 90 MG/DL (ref 70–99)
GLUCOSE BLDC GLUCOMTR-MCNC: 94 MG/DL (ref 70–99)
GLUCOSE BLDC GLUCOMTR-MCNC: 96 MG/DL (ref 70–99)
GLUCOSE SERPL-MCNC: 91 MG/DL (ref 70–99)
MAGNESIUM SERPL-MCNC: 1.9 MG/DL (ref 1.7–2.3)
PHOSPHATE SERPL-MCNC: 2.5 MG/DL (ref 2.5–4.5)
POTASSIUM SERPL-SCNC: 4.2 MMOL/L (ref 3.4–5.3)
SODIUM SERPL-SCNC: 138 MMOL/L (ref 136–145)

## 2023-06-17 PROCEDURE — 250N000013 HC RX MED GY IP 250 OP 250 PS 637: Performed by: PHYSICIAN ASSISTANT

## 2023-06-17 PROCEDURE — 71045 X-RAY EXAM CHEST 1 VIEW: CPT

## 2023-06-17 PROCEDURE — 84100 ASSAY OF PHOSPHORUS: CPT | Performed by: PHYSICIAN ASSISTANT

## 2023-06-17 PROCEDURE — 99239 HOSP IP/OBS DSCHRG MGMT >30: CPT | Performed by: STUDENT IN AN ORGANIZED HEALTH CARE EDUCATION/TRAINING PROGRAM

## 2023-06-17 PROCEDURE — 80048 BASIC METABOLIC PNL TOTAL CA: CPT | Performed by: PHYSICIAN ASSISTANT

## 2023-06-17 PROCEDURE — 83735 ASSAY OF MAGNESIUM: CPT | Performed by: PHYSICIAN ASSISTANT

## 2023-06-17 PROCEDURE — 97110 THERAPEUTIC EXERCISES: CPT | Mod: GP | Performed by: PHYSICAL THERAPY ASSISTANT

## 2023-06-17 PROCEDURE — 250N000012 HC RX MED GY IP 250 OP 636 PS 637: Performed by: PHYSICIAN ASSISTANT

## 2023-06-17 PROCEDURE — 250N000013 HC RX MED GY IP 250 OP 250 PS 637: Performed by: HOSPITALIST

## 2023-06-17 PROCEDURE — 36415 COLL VENOUS BLD VENIPUNCTURE: CPT | Performed by: PHYSICIAN ASSISTANT

## 2023-06-17 RX ORDER — CYCLOBENZAPRINE HCL 10 MG
10 TABLET ORAL 3 TIMES DAILY PRN
DISCHARGE
Start: 2023-06-17 | End: 2023-06-19

## 2023-06-17 RX ORDER — LIDOCAINE 4 G/G
1 PATCH TOPICAL EVERY 24 HOURS
DISCHARGE
Start: 2023-06-17 | End: 2023-12-06

## 2023-06-17 RX ORDER — HYDROXYZINE HYDROCHLORIDE 25 MG/1
25 TABLET, FILM COATED ORAL EVERY 6 HOURS PRN
Start: 2023-06-17 | End: 2023-06-19

## 2023-06-17 RX ORDER — SENNOSIDES 8.6 MG
1 TABLET ORAL 2 TIMES DAILY
Start: 2023-06-17 | End: 2023-06-19

## 2023-06-17 RX ORDER — HYDROCORTISONE 2.5 %
CREAM (GRAM) TOPICAL 2 TIMES DAILY PRN
DISCHARGE
Start: 2023-06-17

## 2023-06-17 RX ORDER — GABAPENTIN 300 MG/1
300 CAPSULE ORAL 3 TIMES DAILY
Start: 2023-06-17 | End: 2023-07-13

## 2023-06-17 RX ORDER — HYDROXYZINE HYDROCHLORIDE 50 MG/1
50 TABLET, FILM COATED ORAL EVERY 6 HOURS PRN
Start: 2023-06-17 | End: 2023-06-19

## 2023-06-17 RX ADMIN — Medication 25 MCG: at 08:15

## 2023-06-17 RX ADMIN — Medication 600 MG: at 08:16

## 2023-06-17 RX ADMIN — AZITHROMYCIN MONOHYDRATE 500 MG: 250 TABLET ORAL at 08:17

## 2023-06-17 RX ADMIN — ASPIRIN 325 MG: 325 TABLET, COATED ORAL at 08:17

## 2023-06-17 RX ADMIN — GABAPENTIN 300 MG: 300 CAPSULE ORAL at 08:17

## 2023-06-17 RX ADMIN — DOCUSATE SODIUM 100 MG: 100 CAPSULE, LIQUID FILLED ORAL at 08:16

## 2023-06-17 RX ADMIN — HYDROMORPHONE HYDROCHLORIDE 4 MG: 2 TABLET ORAL at 00:47

## 2023-06-17 RX ADMIN — HYDROMORPHONE HYDROCHLORIDE 4 MG: 2 TABLET ORAL at 08:25

## 2023-06-17 RX ADMIN — HYDROMORPHONE HYDROCHLORIDE 4 MG: 2 TABLET ORAL at 11:51

## 2023-06-17 RX ADMIN — PREDNISONE 2 MG: 1 TABLET ORAL at 08:16

## 2023-06-17 RX ADMIN — INSULIN ASPART 1 UNITS: 100 INJECTION, SOLUTION INTRAVENOUS; SUBCUTANEOUS at 11:51

## 2023-06-17 RX ADMIN — ACETAMINOPHEN 975 MG: 325 TABLET ORAL at 08:17

## 2023-06-17 RX ADMIN — POTASSIUM & SODIUM PHOSPHATES POWDER PACK 280-160-250 MG 1 PACKET: 280-160-250 PACK at 02:22

## 2023-06-17 RX ADMIN — LEVOTHYROXINE SODIUM 137 MCG: 137 TABLET ORAL at 08:25

## 2023-06-17 RX ADMIN — Medication 50 MCG: at 08:26

## 2023-06-17 RX ADMIN — MULTIPLE VITAMINS W/ MINERALS TAB 1 TABLET: TAB at 08:24

## 2023-06-17 ASSESSMENT — ACTIVITIES OF DAILY LIVING (ADL)
ADLS_ACUITY_SCORE: 24
ADLS_ACUITY_SCORE: 22
ADLS_ACUITY_SCORE: 24
ADLS_ACUITY_SCORE: 22
ADLS_ACUITY_SCORE: 22
ADLS_ACUITY_SCORE: 24
ADLS_ACUITY_SCORE: 24

## 2023-06-17 NOTE — PROGRESS NOTES
Diagnosis: R LIZABETH  POD#: 3  Mental Status: A&Ox4  Activity/dangle up 1 GB walker  Diet: reg  Pain: Managed with po dilaudid  Mir/Voiding: Urinal  Tele/Restraints/Iso:   02/LDA: 2L O2, Iv SL  D/C Date: pending  Other Info: dressing CDI. Cms intact. Neuro's intact,Blood sugar checks    Pt on seizure precautions, bed rail padded.  Scheduled xray this morning

## 2023-06-17 NOTE — DISCHARGE SUMMARY
"[facility]  Hospitalist Discharge Summary      Date of Admission:  6/12/2023  Date of Discharge:  6/17/2023  Discharging Provider: Vika Sewell MD  Discharge Service: Hospitalist Service    Discharge Diagnoses   Right femoral neck fracture  Traumatic small volume SAH  Skin tear forehead  Mechanical fall  Right-sided rib 6-9 mildly displaced fractures  COPD with chronic hypoxemic resp failure, on 1LPM NC O2  BRENNEN, non complainat with CPAP  RUL mass, s/p radiation   HTN, HL  CAD  Hypothyroidism  History of papillary thyroid cancer s/p thyroidectomy  Hyperglycemia    Clinically Significant Risk Factors     # Overweight: Estimated body mass index is 25.37 kg/m  as calculated from the following:    Height as of this encounter: 1.778 m (5' 10\").    Weight as of this encounter: 80.2 kg (176 lb 12.9 oz).       Follow-ups Needed After Discharge   Follow-up Appointments     Follow Up and recommended labs and tests      Follow up with Dr. Alexandro Lazaro/Payton Cool PA-C at [facility] ([place] or [place]) within 14 days postoperatively. No   follow up labs or test are needed prior to visit. At this visit x-rays   will be taken, sutures/staples removed, and questions to be answered.   Bring any needed forms with to appointment.    Call for appointment: [phone]  After hours: [phone]  Fax: [phone] or [phone]         Follow Up and recommended labs and tests      Follow up with primary care provider in 1-2 weeks .  No follow up labs or   test are needed.               Discharge Disposition   Discharged to rehabilitation facility  Condition at discharge: Stable    Hospital Course   Harrison Thomas is a 77 year old male with medical history significant for COPD, HTN, HL, hypothyroidism was brought to the ER for evaluation after a fall and is admitted on 6/12/2023 for management of traumatic SAH and right femoral neck fracture.      Traumatic small volume SAH  Skin " tear forehead  CT head showed small volume subarachnoid hemorrhage at the anterolateral right frontal lobe and scalp hematoma.  -Neurosurgery consulted from ER, head CT repeated at 1:34 AM on 6/13 showed mild increase in acute subarachnoid hemorrhage in the right frontal lobe  -Head CT was again repeated 12:22 PM on 6/13/2023.  No significant interval change in acute intraparenchymal hematoma in the right middle frontal gyrus compared to previous exam with small adjacent right frontal subarachnoid hemorrhage being stable.  -At this point neurosurgery plans for follow-up head CT in clinic in 1 month.  -Per neurosurgery can initiate VTE prophylaxis on 6/15/2023 so started on aspirin 325 mg daily.  -BP control SBP<140  -Fall precaution, no seizure ppx initiated by neurosurgery.  -skin tear Rt supraorbital area sutured with absorbable sutures.     Right femoral neck fracture  -Underwent right total hip arthroplasty on 6/14/2023 by ortho   -On  daily for dvt ppx   -PT/OT--->TCU  -WBAT RLE with walker  -No right hip ER and hyperextension  -Dressings: Keep intact. Change if 60% saturated or peeling/falling off.   -Follow-up: 2 weeks post-op with Dr. Lazaro     Mechanical fall  Right-sided rib 6-9 mildly displaced fractures  Reports his Left leg natalie up sometime. Was admitted in hospital 2 months ago for fall and Rib fracture. Fell 3 weeks ago as well but no new injuries sustained.  - xray rib fx protocol done today showed Mildly displaced left lateral/anterolateral sixth through ninth rib  fractures. No pneumothorax on multiple xrays  -Incentive spirometry encouraged; pain management   -stable respiratory status, continue nasal cannula 2 L.  -trauma surgery consulted and no additional reccs other than above     COPD with chronic hypoxemic resp failure, on 1LPM NC O2  BRENNEN, non complainat with CPAP  RUL mass, s/p radiation   -continue PTA inhalers  -on chronic prednisone and azithromycin, continue.   -He  follows up with Dr. Dumont and Pulmonology for probable lung cancer with possible radiation pneumonitis. He has been noted with RUL mass like lesion (PET positive). He underwent VATS biopsy (02/2022) by Dr Dumont but was noted to be incomplete due to a lot of scar tissue. He subsequently underwent radiation therapy for enlarging right upper lobe density.      HTN, HL  CAD  Circumflex stent in 1997, angiogram in 2005 showed moderate LAD and diagonal disease on medical management.  No anginal symptoms reported currently.  -resume PTA regimen including Norvasc, Toprol-XL and rosuvastatin.  -Started on aspirin 325 mg daily on 6/15/2023.     Hypothyroidism  History of papillary thyroid cancer s/p thyroidectomy  Resume PTA Synthroid     Hyperglycemia-patient had couple of episodes of hypoglycemia postoperatively.  HbA1c 5.3. No further management     Consultations This Hospital Stay   ORTHOPEDIC SURGERY IP CONSULT  NEUROSURGERY IP CONSULT  CARE MANAGEMENT / SOCIAL WORK IP CONSULT  PHYSICAL THERAPY ADULT IP CONSULT  TRAUMA SURGERY IP CONSULT  PHYSICAL THERAPY ADULT IP CONSULT  OCCUPATIONAL THERAPY ADULT IP CONSULT  PHYSICAL THERAPY ADULT IP CONSULT  OCCUPATIONAL THERAPY ADULT IP CONSULT  OCCUPATIONAL THERAPY ADULT IP CONSULT    Code Status   No CPR- Do NOT Intubate    Time Spent on this Encounter   I, Vika Sewell MD, personally saw the patient today and spent greater than 30 minutes discharging this patient.       Vika Sewell MD  [facility]  [address] [place] [id number]  Phone: [phone]  Fax: [phone]  ______________________________________________________________________    Physical Exam   Vital Signs: Temp: 99.4  F (37.4  C) Temp src: Oral BP: (!) 148/58 Pulse: 68   Resp: 18 SpO2: 94 % O2 Device: Nasal cannula Oxygen Delivery: 2 LPM  Weight: 176 lbs 12.94 oz  Constitutional:       Appearance: Normal appearance.   HENT:      Head: Normocephalic.   Eyes:       Pupils: Pupils are equal, round, and reactive to light.   Cardiovascular:      Rate and Rhythm: Normal rate and regular rhythm.      Pulses: Normal pulses.      Heart sounds: Normal heart sounds.   Pulmonary:      Effort: Pulmonary effort is normal. No respiratory distress.      Breath sounds: Normal breath sounds.   Abdominal:      General: Abdomen is flat. Bowel sounds are normal. There is no distension.      Tenderness: There is no abdominal tenderness. There is no guarding.   Musculoskeletal:      Cervical back: Normal range of motion.      Comments: Right hip surgical site appears stable.   Skin:     General: Skin is warm and dry.      Comments: Bruising around the right eye.  Sutures about the right eye with dressing in place   Neurological:      General: No focal deficit present.        Primary Care Physician   Yaneli Dozier    Discharge Orders      CT Head w/o Contrast     Medication Therapy Management Referral      Primary Care - Care Coordination Referral      General info for SNF    Length of Stay Estimate: Short Term Care: Estimated # of Days <30  Condition at Discharge: Stable  Level of care:skilled   Rehabilitation Potential: Good  Admission H&P remains valid and up-to-date: Yes  Recent Chemotherapy: N/A  Use Nursing Home Standing Orders: Yes     Mantoux instructions    Give two-step Mantoux (PPD) Per Facility Policy Yes     Follow Up and recommended labs and tests    Follow up with Dr. Alexandro Lazaro/Payton Cool PA-C at [facility] ([place] or [place]) within 14 days postoperatively. No follow up labs or test are needed prior to visit. At this visit x-rays will be taken, sutures/staples removed, and questions to be answered. Bring any needed forms with to appointment.    Call for appointment: [phone]  After hours: [phone]  Fax: [phone] or [phone]     Reason for your hospital stay    S/p right LIZABETH for a femoral neck fracture (DOS: 6/14/23)     Wound care    Please  leave dressing intact for 2 weeks postoperatively. Okay to change if saturated >50% or peeling. Okay to shower. No soaking or submersion. Please contact your orthopedic surgical team if persistent drainage or redness develops around the surgical site.     Additional Discharge Instructions    Pain after surgery is normal and expected.  You will have some amount of pain for several weeks after surgery.  Your pain will improve with time.  There are several things you can do to help reduce your pain including: rest, ice, elevation, and using pain medications as needed. Contact your Surgeon Team if you have pain that persists or worsens after surgery despite rest, ice, elevation, and taking your medication(s) as prescribed. Contact your Surgeon Team if you have new numbness, tingling, or weakness in your operative extremity.    Swelling and/or bruising of the surgical extremity is common and may persist for several months after surgery. In addition to frequent icing and elevation, gentle compressive support with an ACE wrap or tubigrip may help with swelling. Apply compression regularly, removing at least twice daily to perform skin checks. Contact your Surgeon Team if your swelling increases and is NOT associated with an increase in your activity level, or if your swelling increases and is associated with redness and pain.    Ice can be used to control swelling and discomfort after surgery. Place a thin towel over your operative site and apply the ice pack overtop. Leave ice pack in place for 20 minutes, then remove for 20 minutes. Repeat this 20 minutes on/20 minutes off routine as often as tolerated.    Please contact your surgical team with any concerns for infection including increasing redness around your surgical site, pus-like drainage, fever, chills, or flu-like symptoms.     Activity - Up with assistive device     Activity - Up with nursing assistance     Weight bearing status    WBAT RLE with walker      General info for SNF    Length of Stay Estimate: Short Term Care: Estimated # of Days <30  Condition at Discharge: Improving  Level of care:skilled   Rehabilitation Potential: Good  Admission H&P remains valid and up-to-date: Yes  Recent Chemotherapy: N/A  Use Nursing Home Standing Orders: Yes     Follow Up and recommended labs and tests    Follow up with primary care provider in 1-2 weeks .  No follow up labs or test are needed.     Weight bearing status     Physical Therapy Adult Consult    Evaluate and treat as clinically indicated.    Reason: S/p right LIZABETH for a femoral neck fracture (DOS: 6/14/23)     Occupational Therapy Adult Consult    Evaluate and treat as clinically indicated.    Reason: S/p right LIZABETH for a femoral neck fracture (DOS: 6/14/23)     Fall precautions     Hip precautions    No right hip hyperextension or ER     Cane DME    DME Documentation: Describe the reason for need to support medical necessity: Impaired gait status post hip surgery. I, the undersigned, certify that the above prescribed supplies are medically necessary for this patient and is both reasonable and necessary in reference to accepted standards of medical practice in the treatment of this patient's condition and is not prescribed as a convenience.     Walker DME    : DME Documentation: Describe the reason for need to support medical necessity: Impaired gait status post hip surgery. I, the undersigned, certify that the above prescribed supplies are medically necessary for this patient and is both reasonable and necessary in reference to accepted standards of medical practice in the treatment of this patient's condition and is not prescribed as a convenience.     Diet    Follow this diet upon discharge: Orders Placed This Encounter      Advance Diet as Tolerated: Regular Diet Adult       Significant Results and Procedures   Most Recent 3 CBC's:Recent Labs   Lab Test 06/16/23  0707 06/15/23  0742 06/13/23  0654 06/12/23  1407  05/04/23  0844 04/24/23  0732   WBC  --   --   --  10.1 9.0 17.3*   HGB 10.5* 11.5* 13.5 14.0 13.7 14.4   MCV  --   --   --  91 92 89   PLT  --   --   --  194 155 165     Most Recent 3 BMP's:Recent Labs   Lab Test 06/17/23  1145 06/17/23  0814 06/17/23  0704 06/16/23  1217 06/16/23  0707 06/15/23  1302 06/15/23  0742   NA  --   --  138  --  140  --  138   POTASSIUM  --   --  4.2  --  4.2  --  4.2   CHLORIDE  --   --  103  --  106  --  104   CO2  --   --  28  --  27  --  24   BUN  --   --  11.6  --  14.3  --  15.0   CR  --   --  0.67  --  0.72  --  0.77   ANIONGAP  --   --  7  --  7  --  10   KARLIE  --   --  8.6*  --  8.3*  --  8.3*   * 96 91   < > 89   < > 176*    < > = values in this interval not displayed.     Most Recent ESR & CRP:Recent Labs   Lab Test 02/09/23  1225 01/10/23  0709 12/28/22  1045 12/03/22  1004   SED  --   --   --  24*   CRP  --  181.0*   < >  --    CRPI 20.53*  --   --  26.80*    < > = values in this interval not displayed.   ,   Results for orders placed or performed during the hospital encounter of 06/12/23   XR Chest 2 Views    Narrative    XR CHEST TWO VIEWS   6/12/2023 1:59 PM     HISTORY: Trauma, pain    COMPARISON: Chest x-ray on 4/23/2023      Impression    IMPRESSION: AP and lateral views of the chest were obtained.  Cardiomediastinal silhouette is within normal limits. Right upper lobe  pulmonary opacity, improved as compared to 4/23/2023 exam, likely  represent improving infection. Mild right basilar pulmonary opacities,  could be atelectasis. No significant pleural effusion or pneumothorax.  No definite acute osseous pathology. If clinical suspicion of rib  fractures, remains high, rib series is more sensitive for detection of  subtle rib fractures.    SAWYER YOUNG MD         SYSTEM ID:  [id number]   Cervical spine CT w/o contrast    Narrative    CT OF THE CERVICAL SPINE WITHOUT CONTRAST   6/12/2023 1:49 PM     COMPARISON: None    HISTORY: Fall. Trauma. Pain.     TECHNIQUE:  Axial images of the cervical spine were acquired without  intravenous contrast. Multiplanar reformations were created.        Impression    IMPRESSION: Mild degenerative anterolisthesis of C3 upon C4. Mild  degenerative retrolisthesis of C4 upon C5. Alignment otherwise normal;  however, there is straightening of normal cervical lordosis. Vertebral  body heights of the cervical spine are normal. Craniocervical  alignment is normal. There is no evidence for fracture of the cervical  spine. Loss of disc space height and degenerative endplate spurring at  C4-C5, C5-C6 and C6-C7. Moderate to severe facet arthropathy  throughout the cervical spine. No high-grade spinal canal stenosis. No  prevertebral soft tissue swelling.      Radiation dose for this scan was reduced using automated exposure  control, adjustment of the mA and/or kV according to patient size, or  iterative reconstruction technique    ALANNAH POWELL MD         SYSTEM ID:  [id number]   XR Pelvis w Hip Right 1 View    Narrative    XR PELVIS AND RIGHT HIP ONE VIEW   6/12/2023 1:52 PM     HISTORY:  Trauma. Pain.    COMPARISON: Radiographs from 10/15/2022.      Impression    IMPRESSION:  1. Mildly displaced and impacted right femoral neck fracture, new.  2. Diffuse bone demineralization and arterial calcifications again  noted.  3. Bipolar left hip hemiarthroplasty again noted.  4. Degenerative changes in the visualized spine again noted.    ALBERT SANTANA MD         SYSTEM ID:  [id number]   Head CT w/o contrast     Value    Radiologist flags Small volume intracranial hemorrhage likely due to (AA)    Narrative    CT OF THE HEAD WITHOUT CONTRAST  6/12/2023 1:48 PM     COMPARISON: Head CT 6/6/2023    HISTORY: Trauma.    TECHNIQUE: 5 mm thick axial CT images of the head were acquired  without IV contrast material.    FINDINGS: There is a tiny focus of subarachnoid hemorrhage at the  anterolateral aspect of the right frontal lobe likely due to recent  trauma. No  other intracranial hemorrhage. There is mild diffuse  cerebral volume loss. There are subtle patchy areas of decreased  density in the cerebral white matter bilaterally that are consistent  with sequela of chronic small vessel ischemic disease. The ventricles  and basal cisterns are within normal limits in configuration given the  degree of cerebral volume loss.  There is no midline shift.     No intracranial mass or recent infarct.    The visualized paranasal sinuses are well-aerated. There is no  mastoiditis. There are no fractures of the visualized bones. There is  a small right forehead scalp hematoma.      Impression    IMPRESSION:   1. Small volume subarachnoid hemorrhage at the anterolateral right  frontal lobe likely due to recent trauma.  2. Small right forehead scalp hematoma.  3. Diffuse cerebral volume loss and cerebral white matter changes  consistent with chronic small vessel ischemic disease.     [Critical Result: Small volume intracranial hemorrhage likely due to  recent trauma]    Finding was identified on 6/12/2023 1:52 PM.     YASMIN SAVAGE was contacted by Dr. Brown on 6/12/2023 1:57 PM  and verbalized understanding of the critical result.         Radiation dose for this scan was reduced using automated exposure  control, adjustment of the mA and/or kV according to patient size, or  iterative reconstruction technique.    ALANNAH BROWN MD         SYSTEM ID:  [id number]   XR Femur Right 2 Views    Narrative    FEMUR RIGHT TWO VIEWS   6/12/2023 2:56 PM     HISTORY: Right femoral neck fracture, full length films for surgical  planning.    COMPARISON: No prior studies of the distal femur.      Impression    IMPRESSION: The distalmost aspect of the femur was not included in the  field-of-view of the frontal projection. These two views of the distal  two-thirds of the femur show a large calcified intra-articular body in  the knee joint and tricompartmental osteoarthrosis as well as  diffuse  arterial calcification, but no other abnormality.    ALBERT SANTANA MD         SYSTEM ID:  [id number]   XR Shoulder Right G/E 3 Views    Narrative    RIGHT SHOULDER THREE OR MORE VIEWS   6/12/2023 2:59 PM     HISTORY:  Trauma. Pain.    COMPARISON: None.      Impression    IMPRESSION:  1. Moderate amount of hazy opacity in the right mid lung field which  is fully described on the chest report from today.  2. Normal bones, joint spaces and alignment. No fracture. No  subluxation or dislocation.    ALBERT SANTANA MD         SYSTEM ID:  [id number]   CT Head w/o Contrast    Narrative    EXAM: CT HEAD W/O CONTRAST  LOCATION: [facility]  DATE/TIME: 6/12/2023 7:49 PM CDT    INDICATION: SAH s p fall  COMPARISON 06/12/2023.: 06/06/2023  TECHNIQUE: Routine CT Head without IV contrast. Multiplanar reformats. Dose reduction techniques were used.    FINDINGS:  INTRACRANIAL CONTENTS: Small volume subarachnoid hemorrhage is noted about the right frontal lobe, mildly progressed. No new or worsening intracranial hemorrhage elsewhere. No mass effect or midline shift. No CT evidence of acute infarct. Mild presumed   chronic small vessel ischemic changes. Mild to moderate generalized volume loss. No hydrocephalus.     VISUALIZED ORBITS/SINUSES/MASTOIDS: Right periorbital soft tissue hematoma. Calvarium is intact. Partial opacification of the left maxillary sinus. No middle ear or mastoid effusion.    BONES/SOFT TISSUES: No acute abnormality.      Impression    IMPRESSION:  1.  Small volume subarachnoid hemorrhage about the right frontal lobe, mildly increased.  2.  Brain atrophy and presumed chronic microvascular ischemic changes as above.       CT Head w/o Contrast     Value    Radiologist flags Increased hemorrhage. (AA)    Narrative    EXAM: CT HEAD W/O CONTRAST  LOCATION: [facility]  DATE/TIME: 6/13/2023 1:34 AM CDT    INDICATION: SAH, increased on  repeat.  COMPARISON: 6/12/2023 at 1934  TECHNIQUE: Routine CT Head without IV contrast. Multiplanar reformats. Dose reduction techniques were used.    FINDINGS:  INTRACRANIAL CONTENTS: Small volume acute subarachnoid hemorrhage in the right frontal lobe has mildly increased. Question small amount of adjacent parenchymal hemorrhage could be newly visualized. No new mass effect or hemorrhage elsewhere. Volume loss   and presumed chronic small vessel ischemia are stable. No definite CT evidence of acute infarct.    VISUALIZED ORBITS/SINUSES/MASTOIDS: No intraorbital abnormality. No paranasal sinus mucosal disease. No middle ear or mastoid effusion.    BONES/SOFT TISSUES: No acute abnormality.      Impression    IMPRESSION:  1.  Mild increase in small volume acute subarachnoid hemorrhage in the right frontal lobe. Question newly visualized small amount of parenchymal hemorrhage in the adjacent/underlying right frontal lobe. Continued follow-up suggested.    2.  No additional hemorrhage or mass effect.    [Critical Result: Increased hemorrhage.    Finding was identified on 6/13/2023 1:47 AM CDT.     Dr. Logan was contacted by me on 6/13/2023 1:52 AM CDT and verbalized understanding of the critical result.    CT Head w/o Contrast    Narrative    CT SCAN OF THE HEAD WITHOUT CONTRAST   6/13/2023 12:22 PM     HISTORY: Follow up SAH, parenchymal hemorrhage.    TECHNIQUE:  Axial images of the head and coronal reformations without  IV contrast material. Radiation dose for this scan was reduced using  automated exposure control, adjustment of the mA and/or kV according  to patient size, or iterative reconstruction technique.    COMPARISON: CT of the head 6/13/2023.    FINDINGS: No significant interval change in the acute intraparenchymal  hematoma centered in the right middle frontal gyrus compared to the  prior study measuring approximately 13 mm x 13 mm x 19 mm. There is  again mild surrounding vasogenic edema. There is a  small amount of  adjacent acute subarachnoid hemorrhage in the right frontal sulci.    No other intracranial hemorrhage is identified. There is moderate  generalized brain parenchymal volume loss. Mild scattered patchy  nonspecific foci of hypoattenuation in the cerebral white matter,  likely due to chronic small vessel ischemic disease. The ventricles  appear normal in size and configuration.    Bilateral lens implants. Fracture deformity of the nasal arch, as  before. Moderate polypoid soft tissue in the left maxillary sinus and  mild to moderate scattered polypoid mucosal thickening elsewhere in  the paranasal sinuses. The mastoid and middle ear cavities appear  clear. Relative anterior subluxation of the right mandibular condyle  with respect to the ipsilateral glenoid fossa, possibly positional  and/or related to advanced degenerative changes. A posttraumatic cause  is not completely excluded. Recommend clinical correlation. The  mastoid and middle ear cavities are clear. The calvarium appears  intact.      Impression    IMPRESSION:  1. No significant interval change in an acute intraparenchymal  hematoma centered in the right middle frontal gyrus compared to the  prior exam, with a small amount of adjacent right frontal subarachnoid  hemorrhage. No significant mass effect/herniation.  2. No new intracranial hemorrhage.  3. Brain atrophy and presumed chronic small vessel ischemic change, as  described.  4. Right periorbital superficial soft tissue swelling.  5. Unchanged nasal arch fracture deformity.  6. Asymmetric anterior subluxation across the right temporomandibular  joint is nonspecific. Recommend clinical correlation.    RANDELL NIXON MD         SYSTEM ID:  [id number]   XR Ribs Bilateral 2 Views    Narrative    XR RIBS BILATERAL 2 VIEWS 6/13/2023 1:14 PM     HISTORY: Anterior chest wall pain.    COMPARISON: Chest radiograph 6/12/2023.       Impression    IMPRESSION:    Mildly displaced left  lateral/anterolateral sixth through ninth rib  fractures. No pneumothorax. Redemonstrated opacity in the right  midlung which is slightly more confluent compared to prior chest  radiographs. Recommended follow-up to ensure resolution.     NOTE: ABNORMAL REPORT    THE DICTATION ABOVE DESCRIBES AN ABNORMAL REPORT FOR WHICH FOLLOW-UP  IS NEEDED.    SLOAN LEE MD         SYSTEM ID:  [id number]   XR Chest 1 View    Narrative    CHEST 1 VIEW  6/14/2023 11:08 AM       INDICATION: Pre-op total hip replacement. Trauma patient with rib  fracture(s).    COMPARISON: 6/13/2023, 4/21/2023, 1/9/2023       Impression    IMPRESSION: Masslike consolidation in the anterior right upper lobe is  unchanged from prior examinations. Pulmonary vascular congestion. No  acute infiltrate. No pleural effusion.    ES KEANE MD         SYSTEM ID:  [id number]   XR Surgery FREDY Fluoro Less Than 5 Min w Stills    Narrative    SURGERY C-ARM FLUORO LESS THAN 5 MINUTES WITH STILLS 6/14/2023 2:50 PM      HISTORY: Right anterior total hip arthroplasty.    NUMBER OF IMAGES ACQUIRED: 2    VIEWS: 1    FLUOROSCOPY TIME: 0.4 minutes.      Impression    IMPRESSION: Total hip arthroplasty without evidence of complication,  periprosthetic fracture or malalignment.    ALBERT SANTANA MD         SYSTEM ID:  [id number]   XR Pelvis w Hip Port Right 1 View    Narrative    XR PELVIS AND HIP PORTABLE RIGHT 1 VIEW   6/14/2023 4:00 PM     HISTORY:  Status post Hip surgery    Comparison: 6/12/2023 radiographs.      Impression    IMPRESSION: New right total hip arthroplasty with adjacent gas.  Excellent position and alignment. There is no evidence of complication  or periprosthetic fracture. No other change.    ALBERT SANTANA MD         SYSTEM ID:  [id number]   XR Chest 1 View    Narrative    CHEST ONE VIEW  6/15/2023 8:48 AM       INDICATION: Trauma Patient with Rib Fracture(s).    COMPARISON: 6/14/2023       Impression    IMPRESSION: Masslike consolidation  in the inferior right upper lobe is  unchanged. Pulmonary vascular congestion. Lungs otherwise clear. No  pneumothorax or pleural effusion.    ES KEANE MD         SYSTEM ID:  [id number]   XR Chest 1 View    Narrative    CHEST ONE VIEW 6/16/2023 11:31 AM       INDICATION: Trauma Patient with Rib Fracture(s).    COMPARISON: 6/15/2023       Impression    IMPRESSION: Stable masslike opacity with volume loss in the right  upper lobe. Lungs otherwise clear. No pleural effusion or  pneumothorax.    ES KEANE MD         SYSTEM ID:  [id number]   XR Chest 1 View    Narrative    EXAM: XR CHEST 1 VIEW  LOCATION: [facility]  DATE: 6/17/2023    INDICATION: Trauma Patient with Rib Fracture(s)  COMPARISON: Portable AP view of the chest 06/15/2023      Impression    IMPRESSION:     Persistent shallow lung inflation which accentuates the cardiac silhouette and width of the mediastinum. Crowding of the bronchovascular structures around the kris.    A focal airspace opacity in the right lung just above the hilum is less distinct. No new/worsening airspace opacities.    No pleural fluid is accumulated. No pneumothorax.    Chronic appearing fracture deformities of low left lateral ribs.     *Note: Due to a large number of results and/or encounters for the requested time period, some results have not been displayed. A complete set of results can be found in Results Review.       Discharge Medications   Current Discharge Medication List      START taking these medications    Details   aspirin (ASA) 325 MG EC tablet Take 1 tablet (325 mg) by mouth daily for 42 days  Qty: 42 tablet, Refills: 0    Associated Diagnoses: Closed fracture of neck of right femur, initial encounter (H)      gabapentin (NEURONTIN) 300 MG capsule Take 1 capsule (300 mg) by mouth 3 times daily for 30 days    Associated Diagnoses: Fall, initial encounter      HYDROmorphone (DILAUDID) 2 MG tablet Take 1 tablet (2 mg) by mouth every 3  hours as needed for severe pain  Qty: 25 tablet, Refills: 0    Associated Diagnoses: Closed fracture of neck of right femur, initial encounter (H)      !! hydrOXYzine (ATARAX) 10 MG tablet Take 1 tablet (10 mg) by mouth every 6 hours as needed for other (adjuvant pain)  Qty: 20 tablet, Refills: 0    Associated Diagnoses: Closed fracture of neck of right femur, initial encounter (H)      !! hydrOXYzine (ATARAX) 25 MG tablet Take 1 tablet (25 mg) by mouth every 6 hours as needed for other (adjuvant pain)    Associated Diagnoses: Closed fracture of neck of right femur, initial encounter (H); Pruritic rash      !! hydrOXYzine (ATARAX) 50 MG tablet Take 1 tablet (50 mg) by mouth every 6 hours as needed for other (adjuvant pain)    Associated Diagnoses: Closed fracture of neck of right femur, initial encounter (H); Pruritic rash      Lidocaine (LIDOCARE) 4 % Patch Place 1 patch onto the skin every 24 hours To prevent lidocaine toxicity, patient should be patch free for 12 hrs daily.    Associated Diagnoses: Closed fracture of one rib, unspecified laterality, initial encounter      !! melatonin 1 MG TABS tablet Take 3 tablets (3 mg) by mouth nightly as needed for sleep    Associated Diagnoses: Closed fracture of one rib, unspecified laterality, initial encounter      methocarbamol (ROBAXIN) 500 MG tablet Take 1 tablet (500 mg) by mouth every 6 hours as needed for muscle spasms  Qty: 20 tablet, Refills: 0    Associated Diagnoses: Closed fracture of neck of right femur, initial encounter (H)      sennosides (SENOKOT) 8.6 MG tablet Take 1 tablet by mouth 2 times daily    Associated Diagnoses: Closed fracture of neck of right femur, initial encounter (H)       !! - Potential duplicate medications found. Please discuss with provider.      CONTINUE these medications which have CHANGED    Details   acetaminophen (TYLENOL) 325 MG tablet Take 3 tablets (975 mg) by mouth every 8 hours as needed for pain or mild pain  Qty: 100  tablet, Refills: 0    Associated Diagnoses: Closed fracture of one rib, unspecified laterality, initial encounter      cyclobenzaprine (FLEXERIL) 10 MG tablet Take 1 tablet (10 mg) by mouth 3 times daily as needed    Associated Diagnoses: Closed fracture of one rib, unspecified laterality, initial encounter      hydrocortisone 2.5 % cream Apply topically 2 times daily as needed for itching    Associated Diagnoses: Pruritic rash         CONTINUE these medications which have NOT CHANGED    Details   albuterol (ACCUNEB) 1.25 MG/3ML neb solution Take 1 vial (1.25 mg) by nebulization every 4 hours as needed for shortness of breath or wheezing  Qty: 90 mL, Refills: 4    Associated Diagnoses: COPD exacerbation (H)      albuterol (PROAIR HFA/PROVENTIL HFA/VENTOLIN HFA) 108 (90 Base) MCG/ACT inhaler INHALE 2 PUFFS BY MOUTH EVERY 6 HOURS AS NEEDED FOR WHEEZE OR FOR SHORTNESS OF BREATH  Qty: 18 g, Refills: 3    Associated Diagnoses: Chronic obstructive pulmonary disease with (acute) exacerbation (H)      amLODIPine (NORVASC) 5 MG tablet Take 5 mg by mouth At Bedtime    Associated Diagnoses: Benign essential hypertension      ascorbic acid 1000 MG TABS tablet Take 1,000 mg by mouth daily      azithromycin (ZITHROMAX) 500 MG tablet Take 1 tablet (500 mg) by mouth every other day  Qty: 31 tablet, Refills: 3    Associated Diagnoses: COPD, very severe (H)      calcium carbonate (OS-KARLIE) 1500 (600 Ca) MG tablet Take 600 mg by mouth daily      cholecalciferol 25 MCG (1000 UT) TABS Take 1,000 Units by mouth daily       diclofenac (VOLTAREN) 1 % topical gel Apply 2 g topically 3 times daily as needed for moderate pain (left hip/back)  Qty: 100 g, Refills: 1    Associated Diagnoses: Hip pain, left      DULoxetine (CYMBALTA) 20 MG capsule Take 1 capsule (20 mg) by mouth At Bedtime    Associated Diagnoses: PAVAN (generalized anxiety disorder); Situational depression      Fluticasone-Umeclidin-Vilanterol (TRELEGY ELLIPTA) 100-62.5-25 MCG/INH  oral inhaler Inhale 1 puff into the lungs daily  Qty: 90 each, Refills: 3    Associated Diagnoses: Obstructive chronic bronchitis with exacerbation (H)      furosemide (LASIX) 20 MG tablet TAKE 1 TABLET BY MOUTH EVERY DAY  Qty: 90 tablet, Refills: 3    Associated Diagnoses: Essential hypertension, benign      guaiFENesin (MUCINEX) 600 MG 12 hr tablet Take 600 mg by mouth 2 times daily as needed for congestion      levothyroxine (SYNTHROID/LEVOTHROID) 137 MCG tablet Take 1 tablet (137 mcg) by mouth daily , starting on 21  Qty: 90 tablet, Refills: 3    Associated Diagnoses: Postsurgical hypothyroidism      MegaRed Omega-3 Krill Oil 500 MG CAPS Take 500 mg by mouth daily    Comments: Patient reported. OTC.      !! Melatonin 10 MG TABS tablet Take 10 mg by mouth At Bedtime    Comments: Patient reported. OTC.      metoprolol succinate ER (TOPROL XL) 50 MG 24 hr tablet TAKE 1 TABLET (50 MG) BY MOUTH DAILY (TAKE WITH 25MG TABLET FOR TOTAL 75MG DAILY)  Qty: 90 tablet, Refills: 2    Associated Diagnoses: Essential (primary) hypertension      multivitamin w/minerals (THERA-VIT-M) tablet Take 1 tablet by mouth daily       naloxone (NARCAN) 4 MG/0.1ML nasal spray Spray 1 spray (4 mg) into one nostril alternating nostrils once as needed for opioid reversal every 2-3 minutes until assistance arrives  Qty: 0.2 mL, Refills: 3    Associated Diagnoses: Spinal stenosis of lumbar region with neurogenic claudication; Metastasis from thyroid cancer (H); Rheumatoid arthritis involving multiple sites with positive rheumatoid factor (H)      nitroGLYcerin (NITROSTAT) 0.4 MG sublingual tablet FOR CHEST PAIN PLACE 1 TAB UNDER TONGUE EVERY 5 MIN FOR 3 DOSES. IF SYMPTOMS PERSIST 5 MIN AFTER 1ST DOSE CALL 911  Qty: 25 tablet, Refills: 3    Comments: DX Code Needed  PLEASE SEND PT STATES ONES HE HAS ARE CLOSE TO .  Associated Diagnoses: Coronary artery disease involving native coronary artery of native heart without angina pectoris       potassium chloride ER (KLOR-CON M) 20 MEQ CR tablet Take 1 tablet (20 mEq) by mouth daily (with lunch) Prescribed as twice daily but pt is only taking it daily at lunch.    Associated Diagnoses: Hypokalemia      predniSONE (DELTASONE) 1 MG tablet Take 2 tablets (2 mg) by mouth daily For COPD    Associated Diagnoses: COPD, very severe (H)      rosuvastatin (CRESTOR) 10 MG tablet TAKE 1 TABLET BY MOUTH EVERY DAY  Qty: 90 tablet, Refills: 2    Associated Diagnoses: Hyperlipidemia, unspecified      Fluticasone-Umeclidin-Vilanterol (TRELEGY ELLIPTA) 200-62.5-25 MCG/ACT oral inhaler Inhale 1 puff into the lungs daily  Qty: 3 each, Refills: 3    Associated Diagnoses: COPD, very severe (H)       !! - Potential duplicate medications found. Please discuss with provider.      STOP taking these medications       aspirin 81 MG tablet Comments:   Reason for Stopping:         oxyCODONE (ROXICODONE) 5 MG tablet Comments:   Reason for Stopping:             Allergies   Allergies   Allergen Reactions     Levaquin Difficulty breathing     Plavix [Clopidogrel Bisulfate] Itching     Atorvastatin Calcium Cramps     lipitor     Cats      Dogs      Hctz [Hydrochlorothiazide]      Rash on legs     Sulfasalazine Other (See Comments)     Stomach cramps

## 2023-06-17 NOTE — PLAN OF CARE
Diagnosis: R total hip arthroplasty  POD#: 3  Mental Status: A&O x4  Activity/dangle assist of 1 with gait belt and walker  Diet: Reg  Pain: po dilaudid and schedule tylenol  Mir/Voiding: Urinal  Tele/Restraints/Iso:NA  02/LDA:2L NC/  D/C Date: Discharging to [facility]   Other Info: Dressing CDI. Seizure precaution. BG checks with insulin coverage. CMS/Neuro intact. Xray done this morning.

## 2023-06-17 NOTE — PROGRESS NOTES
Care Management Discharge Note    Discharge Date: 06/17/2023       Discharge Disposition: Skilled Nursing Facility    Discharge Services:      Discharge DME:      Discharge Transportation: family or friend will provide    Private pay costs discussed: Not applicable    Does the patient's insurance plan have a 3 day qualifying hospital stay waiver?  Yes   Will the waiver be used for post-acute placement? No    PAS Confirmation Code: [id number]  Patient/family educated on Medicare website which has current facility and service quality ratings:      Education Provided on the Discharge Plan:    Persons Notified of Discharge Plans: Patient, spouse, MD, TCU, charge RN   Patient/Family in Agreement with the Plan:      Handoff Referral Completed: No    Additional Information:  Writer paged MD for discharge orders and asked that they be entered by 1100. Writer called Charge RN for PAN josh at [phone]. She confirmed discharge for today at 1300. Writer explained she would fax orders once received     Addendum 105: Spoke with MD who stated patient and MD feel patient needs a wheelchair ride set up. Writer called [facility] and spoke with Odliia. Wheelchair ride with O2 set for today between [phone]. Writer called and left spouse a voicemail regarding patient being medically ready and patient needing transport set up. Writer attempted to contact patient, bedphone busy. Writer updated bedside nurse who was aware patient was requesting wheelchair transport.  She will update patient of transport time. Writer updated the MD on transport time. Writer updated charge RN. Writer spoke with Leesa in admissions regarding transport time.     Call received from Spouse, Adeola. She is in agreement with private pay ride and is aware of the $80 flat fee and $5 per mile. She is in agreement with discharge plans. Spouse asking patient is seen by therapy at [facility]. Writer explained it depends on staffing with there therapist  But believes they  are staffed 7 days a week for PT, so if they do not see him today they will see patient hopefully tomorrow. Writer expressed she would relay the message to Rachelle to have patient seen if able today. Writer told Leesa in admissions. She confirmed therapy is on site 7 days a week.     Addendum 1210: Discharge orders faxed to KINA Oneil, [facility]   Social Work   [facility]

## 2023-06-17 NOTE — PROGRESS NOTES
Orthopedic Surgery  Harrison Thomas  06/17/2023     Admit Date:  6/12/2023    POD: 3 Days Post-Op   Procedure(s):  Right total hip arthroplasty    Patient resting comfortably in bed.   Pain controlled.  Tolerating oral intake.    Denies nausea or vomiting.  Denies chest pain or shortness of breath.  No acute events overnight.    Of note, the patient is also healing from an acute, traumatic right SAH. Neurosurgery previously following and have since signed off. Okay for VTE pharmacoprophylaxis per neurosurgery note on 6/13/23.    Temp:  [98.3  F (36.8  C)-99.2  F (37.3  C)] 99.2  F (37.3  C)  Pulse:  [61-77] 77  Resp:  [16] 16  BP: (125-156)/(56-65) 156/65  SpO2:  [91 %-99 %] 91 %    Alert and oriented.   Right hip dressing is clean, dry, and intact.   Minimal erythema and ecchymosis of the surrounding skin.   Mild swelling to the right thigh.  Bilateral calves are soft, non-tender.  Right lower extremity is NVI.  5/5 ankle DF and PF, EHL, and FHL.  DP pulse palpable.  Sensation intact bilateral lower extremities.    Labs/Imaging:  Recent Labs   Lab Test 06/16/23  0707 06/15/23  0742 06/13/23  0654 06/12/23  1407 05/04/23  0844 04/24/23  0732   WBC  --   --   --  10.1 9.0 17.3*   HGB 10.5* 11.5* 13.5 14.0 13.7 14.4   PLT  --   --   --  194 155 165     Recent Labs   Lab Test 06/13/23  0654 04/21/23  1043 02/11/22  0749   INR 1.09 0.94 1.03     Recent Labs   Lab Test 02/09/23  1225 12/03/22  1004 11/15/22  1223   CRPI 20.53* 26.80* 97.90*       A/P    1. S/p right DA LIZABETH for a femoral neck fracture  -Continue  mg QD for DVT prophylaxis.    -Mobilize with PT/OT.  -WBAT RLE with walker.  -No right hip ER and hyperextension.  -Continue current pain regimen.  -Dressings: Keep intact. Change if 60% saturated or peeling/falling off.   -Follow-up: 2 weeks post-op with Dr. Lazaro    2. Disposition  -Anticipate d/c to TCU when medically cleared and progressing in PT.    Mónica Lynn PA-C  [facility]  Orthopedics

## 2023-06-18 ENCOUNTER — LAB REQUISITION (OUTPATIENT)
Dept: LAB | Facility: CLINIC | Age: 78
End: 2023-06-18

## 2023-06-18 DIAGNOSIS — Z11.1 ENCOUNTER FOR SCREENING FOR RESPIRATORY TUBERCULOSIS: ICD-10-CM

## 2023-06-18 NOTE — PLAN OF CARE
Physical Therapy Discharge Summary    Reason for therapy discharge:    Discharged to transitional care facility.    Progress towards therapy goal(s). See goals on Care Plan in [facility] electronic health record for goal details.  Goals not met.  Barriers to achieving goals:   discharge from facility.    Therapy recommendation(s):    Continued therapy is recommended.  Rationale/Recommendations:  Pt will benefit from cont PT at TCU to address mobility and strength deficits.

## 2023-06-19 ENCOUNTER — TRANSITIONAL CARE UNIT VISIT (OUTPATIENT)
Dept: GERIATRICS | Facility: CLINIC | Age: 78
End: 2023-06-19
Payer: MEDICARE

## 2023-06-19 ENCOUNTER — TELEPHONE (OUTPATIENT)
Dept: FAMILY MEDICINE | Facility: CLINIC | Age: 78
End: 2023-06-19

## 2023-06-19 ENCOUNTER — PATIENT OUTREACH (OUTPATIENT)
Dept: CARE COORDINATION | Facility: CLINIC | Age: 78
End: 2023-06-19

## 2023-06-19 VITALS
BODY MASS INDEX: 24.85 KG/M2 | WEIGHT: 173.2 LBS | OXYGEN SATURATION: 90 % | RESPIRATION RATE: 20 BRPM | TEMPERATURE: 97.2 F | SYSTOLIC BLOOD PRESSURE: 142 MMHG | DIASTOLIC BLOOD PRESSURE: 67 MMHG | HEART RATE: 88 BPM

## 2023-06-19 DIAGNOSIS — I10 ESSENTIAL HYPERTENSION, BENIGN: ICD-10-CM

## 2023-06-19 DIAGNOSIS — I60.9 SAH (SUBARACHNOID HEMORRHAGE) (H): Primary | ICD-10-CM

## 2023-06-19 DIAGNOSIS — J44.1 OBSTRUCTIVE CHRONIC BRONCHITIS WITH EXACERBATION (H): ICD-10-CM

## 2023-06-19 DIAGNOSIS — S72.001A CLOSED FRACTURE OF NECK OF RIGHT FEMUR, INITIAL ENCOUNTER (H): ICD-10-CM

## 2023-06-19 DIAGNOSIS — E87.6 HYPOKALEMIA: ICD-10-CM

## 2023-06-19 DIAGNOSIS — S22.32XA CLOSED FRACTURE OF ONE RIB OF LEFT SIDE, INITIAL ENCOUNTER: ICD-10-CM

## 2023-06-19 DIAGNOSIS — J44.1 CHRONIC OBSTRUCTIVE PULMONARY DISEASE WITH ACUTE EXACERBATION (H): ICD-10-CM

## 2023-06-19 PROCEDURE — 36415 COLL VENOUS BLD VENIPUNCTURE: CPT | Performed by: NURSE PRACTITIONER

## 2023-06-19 PROCEDURE — P9604 ONE-WAY ALLOW PRORATED TRIP: HCPCS | Performed by: NURSE PRACTITIONER

## 2023-06-19 PROCEDURE — 99309 SBSQ NF CARE MODERATE MDM 30: CPT | Performed by: NURSE PRACTITIONER

## 2023-06-19 PROCEDURE — 86481 TB AG RESPONSE T-CELL SUSP: CPT | Performed by: NURSE PRACTITIONER

## 2023-06-19 RX ORDER — ACETAMINOPHEN 500 MG
1000 TABLET ORAL 3 TIMES DAILY
COMMUNITY

## 2023-06-19 RX ORDER — AMOXICILLIN 250 MG
1 CAPSULE ORAL 2 TIMES DAILY PRN
Status: ON HOLD | COMMUNITY
End: 2024-04-28

## 2023-06-19 RX ORDER — HYDROXYZINE PAMOATE 25 MG/1
25 CAPSULE ORAL 3 TIMES DAILY
COMMUNITY
End: 2023-06-22

## 2023-06-19 RX ORDER — TRAZODONE HYDROCHLORIDE 50 MG/1
50 TABLET, FILM COATED ORAL AT BEDTIME
COMMUNITY
End: 2024-04-26

## 2023-06-19 ASSESSMENT — ACTIVITIES OF DAILY LIVING (ADL): DEPENDENT_IADLS:: INDEPENDENT

## 2023-06-19 NOTE — TELEPHONE ENCOUNTER
Patient Contact - Adeola (C2C)    Attempt # 1     Was call answered?  No.  Left message on voicemail with information to call triage back.    On callback - please notify Adeola of clarified rx - [facility] - please clarify if wife would like to  rx or if it should be faxed to facility    Ana Gong RN  [facility]

## 2023-06-19 NOTE — PROGRESS NOTES
Clinic Care Coordination Contact  Care Coordination Transition Communication    Referral Source: IP Handoff    Clinical Data: Patient was hospitalized at [facility] from 6/12/2023 to 6/17/2023 with diagnosis of Facial lacertation, Mechanical fall, right femoral neck fracture, right-sided rib 6-9 mildly displaced fractures, COPD, CAD, HTN.     Transition to Facility:              Facility Name: [facility]               SW phone number/fax: [phone]    Plan: RN/SW Care Coordinator will await notification from facility staff informing RN/SW Care Coordinator of patient's discharge plans/needs. RN/SW Care Coordinator will review chart and outreach to facility staff every 4 weeks and as needed.      Lia Segal RN, BSN, PHN  Primary Care / Care Coordinator   [facility]  E-mail [email or web address]   [phone]
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
## 2023-06-19 NOTE — TELEPHONE ENCOUNTER
Patient's wife Adeola (C2C) calling on behalf of the patient to clarify instructions for patient's trelegy-ellipta.    Patient is currently being discharged from the hospital to [place] on [facility] and Adeola states they were instructed to contact Dr. Dozier to clarify instructions. States both the hospital and TCU were unsure what patient should be using.     Patient has 2 different prescriptions for different strengths:    Fluticasone-Umeclidin-Vilanterol (TRELEGY ELLIPTA) 200-62.5-25 MCG/ACT oral inhaler 3 each 3 6/15/2023  --   Sig - Route: Inhale 1 puff into the lungs daily - Inhalation     Fluticasone-Umeclidin-Vilanterol (TRELEGY ELLIPTA) 100-62.5-25 MCG/INH oral inhaler 90 each 3 9/8/2022  No   Sig - Route: Inhale 1 puff into the lungs daily - Inhalation     Adeola states that patient has been using the 100mcg strength - routing to PCP to please advise if okay for patient to continue using the 100 or if he should start using the 200mcg?     Callback to Adeola [phone] - ok to leave detailed VM     Ana Gong RN  [facility]

## 2023-06-19 NOTE — LETTER
[facility]   To:   [facility]           Please give to facility    From:   Lia Segal RN  Care Coordinator   [facility]   P: [phone]  [email or web address]   Patient Name:  Harrison Thomas YOB: 1945   Admit date: 6/17/2023      *Information Needed:  Please contact me when the patient will discharge (or if they will move to long term care)- include the discharge date, disposition, and main diagnosis   - If the patient is discharged with home care services, please provide the name of the agency    Also- Please inform me if a care conference is being held.   Phone, Fax or Email with information      Lia Segal RN, BSN, PHN  Primary Care / Care Coordinator   [facility]  E-mail [email or web address]   [phone]                Thank you

## 2023-06-19 NOTE — TELEPHONE ENCOUNTER
Writer called and spoke with patient's spouse Adeola (C2C).    Adeola expressed verbal understanding and is agreeable, asked that paper rx be placed at  for .    Writer placed rx at  in patient file.     No further questions or concerns at this time. Signing encounter.    Ana Gong RN  [facility]

## 2023-06-20 ENCOUNTER — LAB REQUISITION (OUTPATIENT)
Dept: LAB | Facility: CLINIC | Age: 78
End: 2023-06-20

## 2023-06-20 DIAGNOSIS — I10 ESSENTIAL (PRIMARY) HYPERTENSION: ICD-10-CM

## 2023-06-20 LAB
GAMMA INTERFERON BACKGROUND BLD IA-ACNC: 0.01 IU/ML
M TB IFN-G BLD-IMP: NEGATIVE
M TB IFN-G CD4+ BCKGRND COR BLD-ACNC: 4.84 IU/ML
MITOGEN IGNF BCKGRD COR BLD-ACNC: 0 IU/ML
MITOGEN IGNF BCKGRD COR BLD-ACNC: 0.01 IU/ML
QUANTIFERON MITOGEN: 4.85 IU/ML
QUANTIFERON NIL TUBE: 0.01 IU/ML
QUANTIFERON TB1 TUBE: 0.02 IU/ML
QUANTIFERON TB2 TUBE: 0.01
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
## 2023-06-21 ENCOUNTER — TRANSITIONAL CARE UNIT VISIT (OUTPATIENT)
Dept: GERIATRICS | Facility: CLINIC | Age: 78
End: 2023-06-21
Payer: MEDICARE

## 2023-06-21 ENCOUNTER — TELEPHONE (OUTPATIENT)
Dept: FAMILY MEDICINE | Facility: CLINIC | Age: 78
End: 2023-06-21

## 2023-06-21 VITALS
DIASTOLIC BLOOD PRESSURE: 53 MMHG | BODY MASS INDEX: 24.82 KG/M2 | RESPIRATION RATE: 18 BRPM | WEIGHT: 173.4 LBS | SYSTOLIC BLOOD PRESSURE: 120 MMHG | OXYGEN SATURATION: 91 % | HEIGHT: 70 IN | TEMPERATURE: 97.5 F | HEART RATE: 58 BPM

## 2023-06-21 DIAGNOSIS — I60.9 SAH (SUBARACHNOID HEMORRHAGE) (H): ICD-10-CM
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
## 2023-06-21 DIAGNOSIS — I25.10 CORONARY ARTERY DISEASE INVOLVING NATIVE CORONARY ARTERY OF NATIVE HEART WITHOUT ANGINA PECTORIS: ICD-10-CM

## 2023-06-21 DIAGNOSIS — G47.00 INSOMNIA, UNSPECIFIED TYPE: ICD-10-CM

## 2023-06-21 DIAGNOSIS — R53.81 PHYSICAL DECONDITIONING: ICD-10-CM

## 2023-06-21 DIAGNOSIS — J44.9 COPD, SEVERE (H): ICD-10-CM

## 2023-06-21 DIAGNOSIS — G47.33 OSA (OBSTRUCTIVE SLEEP APNEA): ICD-10-CM

## 2023-06-21 DIAGNOSIS — E89.0 POSTABLATIVE HYPOTHYROIDISM: ICD-10-CM

## 2023-06-21 DIAGNOSIS — I10 ESSENTIAL HYPERTENSION: ICD-10-CM

## 2023-06-21 DIAGNOSIS — D62 ACUTE BLOOD LOSS ANEMIA: ICD-10-CM

## 2023-06-21 DIAGNOSIS — S72.001D CLOSED FRACTURE OF NECK OF RIGHT FEMUR WITH ROUTINE HEALING, SUBSEQUENT ENCOUNTER: ICD-10-CM

## 2023-06-21 DIAGNOSIS — K59.01 SLOW TRANSIT CONSTIPATION: ICD-10-CM

## 2023-06-21 DIAGNOSIS — S22.41XD CLOSED FRACTURE OF MULTIPLE RIBS OF RIGHT SIDE WITH ROUTINE HEALING, SUBSEQUENT ENCOUNTER: ICD-10-CM

## 2023-06-21 DIAGNOSIS — S00.03XD HEMATOMA OF SCALP, SUBSEQUENT ENCOUNTER: ICD-10-CM

## 2023-06-21 DIAGNOSIS — E78.5 DYSLIPIDEMIA: ICD-10-CM

## 2023-06-21 LAB
ANION GAP SERPL CALCULATED.3IONS-SCNC: 11 MMOL/L (ref 7–15)
BUN SERPL-MCNC: 13.8 MG/DL (ref 8–23)
CALCIUM SERPL-MCNC: 9.2 MG/DL (ref 8.8–10.2)
CHLORIDE SERPL-SCNC: 108 MMOL/L (ref 98–107)
CREAT SERPL-MCNC: 0.83 MG/DL (ref 0.67–1.17)
DEPRECATED HCO3 PLAS-SCNC: 27 MMOL/L (ref 22–29)
ERYTHROCYTE [DISTWIDTH] IN BLOOD BY AUTOMATED COUNT: 15.1 % (ref 10–15)
GFR SERPL CREATININE-BSD FRML MDRD: >90 ML/MIN/1.73M2
GLUCOSE SERPL-MCNC: 75 MG/DL (ref 70–99)
HCT VFR BLD AUTO: 34.5 % (ref 40–53)
HGB BLD-MCNC: 10.6 G/DL (ref 13.3–17.7)
MCH RBC QN AUTO: 29.1 PG (ref 26.5–33)
MCHC RBC AUTO-ENTMCNC: 30.7 G/DL (ref 31.5–36.5)
MCV RBC AUTO: 95 FL (ref 78–100)
PLATELET # BLD AUTO: 249 10E3/UL (ref 150–450)
POTASSIUM SERPL-SCNC: 4.5 MMOL/L (ref 3.4–5.3)
RBC # BLD AUTO: 3.64 10E6/UL (ref 4.4–5.9)
SODIUM SERPL-SCNC: 146 MMOL/L (ref 136–145)
WBC # BLD AUTO: 7.2 10E3/UL (ref 4–11)

## 2023-06-21 PROCEDURE — P9604 ONE-WAY ALLOW PRORATED TRIP: HCPCS | Performed by: NURSE PRACTITIONER

## 2023-06-21 PROCEDURE — 36415 COLL VENOUS BLD VENIPUNCTURE: CPT | Performed by: NURSE PRACTITIONER

## 2023-06-21 PROCEDURE — 82310 ASSAY OF CALCIUM: CPT | Performed by: NURSE PRACTITIONER

## 2023-06-21 PROCEDURE — 85027 COMPLETE CBC AUTOMATED: CPT | Performed by: NURSE PRACTITIONER

## 2023-06-21 PROCEDURE — 99305 1ST NF CARE MODERATE MDM 35: CPT | Performed by: INTERNAL MEDICINE

## 2023-06-21 RX ORDER — POLYETHYLENE GLYCOL 3350 17 G/17G
1 POWDER, FOR SOLUTION ORAL DAILY PRN
COMMUNITY
End: 2023-12-06
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
## 2023-06-21 NOTE — TELEPHONE ENCOUNTER
Fax Request:     From: Pt's wife, Adeola called clinic. ([facility]) on file.     Fax request: Adeola would like the following prescription to be faxed to [facility] today 6/21/2023:    Fluticasone-Umeclidin-Vilant (TRELEGY ELLIPTA) 100-62.5-25 MCG/ACT oral inhaler  Sig - Route: Inhale 1 puff into the lungs daily - Inhalation    Fax to:   [facility]  Attention: [facility]  Fax#: [phone]      [facility]'s current prescription on file for the patient is for 200mcg.   Pt's wife stating MD confirmed dose strength is 100mcg, not 200mcg.   Please fax updated prescription to [facility] if request approved. If request is approved and faxed, the cost of medication will be totally covered and patient will not have to pay anything out of pocket for this medication.

## 2023-06-21 NOTE — TELEPHONE ENCOUNTER
Rx was placed at the . Writer printed and faxed rx to fax number provided Attention: [facility] Fax#: [phone].    Writer called and notified Adeola who is appreciative of follow up. Adeola requested that rx also be left at  for now in case [facility] does not receive. Writer placed at  file per request of Adeola.    Closing encounter.    Ana Gong RN  [facility]

## 2023-06-22 ENCOUNTER — DISCHARGE SUMMARY NURSING HOME (OUTPATIENT)
Dept: GERIATRICS | Facility: CLINIC | Age: 78
End: 2023-06-22
Payer: MEDICARE

## 2023-06-22 VITALS
RESPIRATION RATE: 16 BRPM | DIASTOLIC BLOOD PRESSURE: 65 MMHG | OXYGEN SATURATION: 95 % | SYSTOLIC BLOOD PRESSURE: 138 MMHG | HEART RATE: 53 BPM | TEMPERATURE: 97 F | WEIGHT: 173.4 LBS | BODY MASS INDEX: 24.82 KG/M2 | HEIGHT: 70 IN

## 2023-06-22 DIAGNOSIS — I60.9 SAH (SUBARACHNOID HEMORRHAGE) (H): Primary | ICD-10-CM

## 2023-06-22 DIAGNOSIS — J44.1 CHRONIC OBSTRUCTIVE PULMONARY DISEASE WITH ACUTE EXACERBATION (H): ICD-10-CM

## 2023-06-22 DIAGNOSIS — I10 ESSENTIAL HYPERTENSION, BENIGN: ICD-10-CM

## 2023-06-22 DIAGNOSIS — S72.001A CLOSED FRACTURE OF NECK OF RIGHT FEMUR, INITIAL ENCOUNTER (H): ICD-10-CM

## 2023-06-22 DIAGNOSIS — S22.32XA CLOSED FRACTURE OF ONE RIB OF LEFT SIDE, INITIAL ENCOUNTER: ICD-10-CM

## 2023-06-22 PROCEDURE — 99315 NF DSCHRG MGMT 30 MIN/LESS: CPT | Performed by: NURSE PRACTITIONER
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
## 2023-06-26 ENCOUNTER — TRANSFERRED RECORDS (OUTPATIENT)
Dept: HEALTH INFORMATION MANAGEMENT | Facility: CLINIC | Age: 78
End: 2023-06-26
Payer: MEDICARE

## 2023-07-11 ASSESSMENT — SLEEP AND FATIGUE QUESTIONNAIRES
HOW LIKELY ARE YOU TO NOD OFF OR FALL ASLEEP WHEN YOU ARE A PASSENGER IN A CAR FOR AN HOUR WITHOUT A BREAK: SLIGHT CHANCE OF DOZING
HOW LIKELY ARE YOU TO NOD OFF OR FALL ASLEEP WHILE SITTING QUIETLY AFTER LUNCH WITHOUT ALCOHOL: SLIGHT CHANCE OF DOZING
HOW LIKELY ARE YOU TO NOD OFF OR FALL ASLEEP WHILE SITTING INACTIVE IN A PUBLIC PLACE: SLIGHT CHANCE OF DOZING
HOW LIKELY ARE YOU TO NOD OFF OR FALL ASLEEP IN A CAR, WHILE STOPPED FOR A FEW MINUTES IN TRAFFIC: WOULD NEVER DOZE
HOW LIKELY ARE YOU TO NOD OFF OR FALL ASLEEP WHILE LYING DOWN TO REST IN THE AFTERNOON WHEN CIRCUMSTANCES PERMIT: SLIGHT CHANCE OF DOZING
HOW LIKELY ARE YOU TO NOD OFF OR FALL ASLEEP WHILE WATCHING TV: MODERATE CHANCE OF DOZING
HOW LIKELY ARE YOU TO NOD OFF OR FALL ASLEEP WHILE SITTING AND READING: MODERATE CHANCE OF DOZING
HOW LIKELY ARE YOU TO NOD OFF OR FALL ASLEEP WHILE SITTING AND TALKING TO SOMEONE: WOULD NEVER DOZE

## 2023-07-13 ENCOUNTER — ANCILLARY PROCEDURE (OUTPATIENT)
Dept: CT IMAGING | Facility: CLINIC | Age: 78
End: 2023-07-13
Attending: PHYSICIAN ASSISTANT
Payer: MEDICARE

## 2023-07-13 ENCOUNTER — OFFICE VISIT (OUTPATIENT)
Dept: FAMILY MEDICINE | Facility: CLINIC | Age: 78
End: 2023-07-13
Payer: MEDICARE

## 2023-07-13 ENCOUNTER — HOSPITAL ENCOUNTER (OUTPATIENT)
Dept: BONE DENSITY | Facility: CLINIC | Age: 78
Discharge: HOME | End: 2023-07-13
Attending: INTERNAL MEDICINE
Payer: MEDICARE

## 2023-07-13 ENCOUNTER — OFFICE VISIT (OUTPATIENT)
Dept: NEUROSURGERY | Facility: CLINIC | Age: 78
End: 2023-07-13
Payer: MEDICARE

## 2023-07-13 VITALS
HEIGHT: 70 IN | DIASTOLIC BLOOD PRESSURE: 70 MMHG | HEART RATE: 50 BPM | WEIGHT: 173 LBS | BODY MASS INDEX: 24.77 KG/M2 | TEMPERATURE: 97.7 F | RESPIRATION RATE: 16 BRPM | SYSTOLIC BLOOD PRESSURE: 138 MMHG | OXYGEN SATURATION: 95 %

## 2023-07-13 VITALS — DIASTOLIC BLOOD PRESSURE: 61 MMHG | HEART RATE: 66 BPM | OXYGEN SATURATION: 93 % | SYSTOLIC BLOOD PRESSURE: 121 MMHG

## 2023-07-13 DIAGNOSIS — M54.41 CHRONIC RIGHT-SIDED LOW BACK PAIN WITH RIGHT-SIDED SCIATICA: ICD-10-CM

## 2023-07-13 DIAGNOSIS — M81.0 SENILE OSTEOPOROSIS: ICD-10-CM

## 2023-07-13 DIAGNOSIS — I10 ESSENTIAL HYPERTENSION, BENIGN: ICD-10-CM

## 2023-07-13 DIAGNOSIS — I60.9 SUBARACHNOID HEMORRHAGE (H): Primary | ICD-10-CM

## 2023-07-13 DIAGNOSIS — M05.79 RHEUMATOID ARTHRITIS INVOLVING MULTIPLE SITES WITH POSITIVE RHEUMATOID FACTOR (H): ICD-10-CM

## 2023-07-13 DIAGNOSIS — I25.10 CORONARY ARTERY DISEASE INVOLVING NATIVE CORONARY ARTERY OF NATIVE HEART WITHOUT ANGINA PECTORIS: ICD-10-CM

## 2023-07-13 DIAGNOSIS — L29.9 ITCHING: ICD-10-CM

## 2023-07-13 DIAGNOSIS — S72.001E: ICD-10-CM

## 2023-07-13 DIAGNOSIS — R53.82 CHRONIC FATIGUE: ICD-10-CM

## 2023-07-13 DIAGNOSIS — G89.29 CHRONIC RIGHT-SIDED LOW BACK PAIN WITH RIGHT-SIDED SCIATICA: ICD-10-CM

## 2023-07-13 DIAGNOSIS — J44.9 COPD, VERY SEVERE (H): Primary | ICD-10-CM

## 2023-07-13 DIAGNOSIS — C73 METASTASIS FROM THYROID CANCER (H): ICD-10-CM

## 2023-07-13 DIAGNOSIS — I60.9 SAH (SUBARACHNOID HEMORRHAGE) (H): ICD-10-CM

## 2023-07-13 DIAGNOSIS — C79.9 METASTASIS FROM THYROID CANCER (H): ICD-10-CM

## 2023-07-13 DIAGNOSIS — D62 ANEMIA DUE TO BLOOD LOSS, ACUTE: ICD-10-CM

## 2023-07-13 DIAGNOSIS — R79.9 ABNORMAL FINDING OF BLOOD CHEMISTRY, UNSPECIFIED: ICD-10-CM

## 2023-07-13 DIAGNOSIS — S72.001A CLOSED FRACTURE OF NECK OF RIGHT FEMUR, INITIAL ENCOUNTER (H): ICD-10-CM

## 2023-07-13 LAB
ALBUMIN SERPL BCG-MCNC: 4.2 G/DL (ref 3.5–5.2)
ALP SERPL-CCNC: 126 U/L (ref 40–129)
ALT SERPL W P-5'-P-CCNC: 11 U/L (ref 0–70)
ANION GAP SERPL CALCULATED.3IONS-SCNC: 11 MMOL/L (ref 7–15)
AST SERPL W P-5'-P-CCNC: 20 U/L (ref 0–45)
BILIRUB SERPL-MCNC: 0.5 MG/DL
BUN SERPL-MCNC: 14.1 MG/DL (ref 8–23)
CALCIUM SERPL-MCNC: 9.5 MG/DL (ref 8.8–10.2)
CHLORIDE SERPL-SCNC: 101 MMOL/L (ref 98–107)
CREAT SERPL-MCNC: 0.96 MG/DL (ref 0.67–1.17)
CRP SERPL-MCNC: 7.9 MG/L
DEPRECATED HCO3 PLAS-SCNC: 29 MMOL/L (ref 22–29)
ERYTHROCYTE [DISTWIDTH] IN BLOOD BY AUTOMATED COUNT: 14.2 % (ref 10–15)
GFR SERPL CREATININE-BSD FRML MDRD: 82 ML/MIN/1.73M2
GLUCOSE SERPL-MCNC: 90 MG/DL (ref 70–99)
HCT VFR BLD AUTO: 44 % (ref 40–53)
HGB BLD-MCNC: 13.7 G/DL (ref 13.3–17.7)
IRON BINDING CAPACITY (ROCHE): 334 UG/DL (ref 240–430)
IRON SATN MFR SERPL: 21 % (ref 15–46)
IRON SERPL-MCNC: 71 UG/DL (ref 61–157)
MCH RBC QN AUTO: 28.2 PG (ref 26.5–33)
MCHC RBC AUTO-ENTMCNC: 31.1 G/DL (ref 31.5–36.5)
MCV RBC AUTO: 91 FL (ref 78–100)
PLATELET # BLD AUTO: 172 10E3/UL (ref 150–450)
POTASSIUM SERPL-SCNC: 4.6 MMOL/L (ref 3.4–5.3)
PROT SERPL-MCNC: 7.3 G/DL (ref 6.4–8.3)
RBC # BLD AUTO: 4.85 10E6/UL (ref 4.4–5.9)
SODIUM SERPL-SCNC: 141 MMOL/L (ref 136–145)
TSH SERPL DL<=0.005 MIU/L-ACNC: 1.82 UIU/ML (ref 0.3–4.2)
VIT B12 SERPL-MCNC: 749 PG/ML (ref 232–1245)
WBC # BLD AUTO: 9.4 10E3/UL (ref 4–11)

## 2023-07-13 PROCEDURE — 84443 ASSAY THYROID STIM HORMONE: CPT | Performed by: INTERNAL MEDICINE

## 2023-07-13 PROCEDURE — 80053 COMPREHEN METABOLIC PANEL: CPT | Performed by: INTERNAL MEDICINE

## 2023-07-13 PROCEDURE — 99213 OFFICE O/P EST LOW 20 MIN: CPT | Performed by: PHYSICIAN ASSISTANT

## 2023-07-13 PROCEDURE — 70450 CT HEAD/BRAIN W/O DYE: CPT | Mod: MG

## 2023-07-13 PROCEDURE — 36415 COLL VENOUS BLD VENIPUNCTURE: CPT | Performed by: INTERNAL MEDICINE

## 2023-07-13 PROCEDURE — 85027 COMPLETE CBC AUTOMATED: CPT | Performed by: INTERNAL MEDICINE

## 2023-07-13 PROCEDURE — 82607 VITAMIN B-12: CPT | Performed by: INTERNAL MEDICINE

## 2023-07-13 PROCEDURE — 77080 DXA BONE DENSITY AXIAL: CPT | Mod: XU

## 2023-07-13 PROCEDURE — 99214 OFFICE O/P EST MOD 30 MIN: CPT | Performed by: INTERNAL MEDICINE

## 2023-07-13 PROCEDURE — 83550 IRON BINDING TEST: CPT | Performed by: INTERNAL MEDICINE

## 2023-07-13 PROCEDURE — 86140 C-REACTIVE PROTEIN: CPT | Performed by: INTERNAL MEDICINE

## 2023-07-13 PROCEDURE — 77081 DXA BONE DENSITY APPENDICULR: CPT

## 2023-07-13 PROCEDURE — 83540 ASSAY OF IRON: CPT | Performed by: INTERNAL MEDICINE

## 2023-07-13 RX ORDER — GABAPENTIN 300 MG/1
300 CAPSULE ORAL 2 TIMES DAILY
Qty: 180 CAPSULE | Refills: 3 | Status: SHIPPED | OUTPATIENT
Start: 2023-07-13 | End: 2023-12-01

## 2023-07-13 RX ORDER — ASPIRIN 81 MG/1
81 TABLET ORAL 2 TIMES DAILY
COMMUNITY
Start: 2023-07-13

## 2023-07-13 RX ORDER — OXYCODONE HYDROCHLORIDE 5 MG/1
5 TABLET ORAL EVERY 6 HOURS PRN
Qty: 90 TABLET | Refills: 0 | Status: SHIPPED | OUTPATIENT
Start: 2023-07-13 | End: 2023-08-16

## 2023-07-13 ASSESSMENT — ANXIETY QUESTIONNAIRES
6. BECOMING EASILY ANNOYED OR IRRITABLE: SEVERAL DAYS
4. TROUBLE RELAXING: MORE THAN HALF THE DAYS
IF YOU CHECKED OFF ANY PROBLEMS ON THIS QUESTIONNAIRE, HOW DIFFICULT HAVE THESE PROBLEMS MADE IT FOR YOU TO DO YOUR WORK, TAKE CARE OF THINGS AT HOME, OR GET ALONG WITH OTHER PEOPLE: VERY DIFFICULT
2. NOT BEING ABLE TO STOP OR CONTROL WORRYING: MORE THAN HALF THE DAYS
GAD7 TOTAL SCORE: 12
GAD7 TOTAL SCORE: 6
IF YOU CHECKED OFF ANY PROBLEMS ON THIS QUESTIONNAIRE, HOW DIFFICULT HAVE THESE PROBLEMS MADE IT FOR YOU TO DO YOUR WORK, TAKE CARE OF THINGS AT HOME, OR GET ALONG WITH OTHER PEOPLE: SOMEWHAT DIFFICULT
3. WORRYING TOO MUCH ABOUT DIFFERENT THINGS: MORE THAN HALF THE DAYS
1. FEELING NERVOUS, ANXIOUS, OR ON EDGE: MORE THAN HALF THE DAYS
2. NOT BEING ABLE TO STOP OR CONTROL WORRYING: SEVERAL DAYS
3. WORRYING TOO MUCH ABOUT DIFFERENT THINGS: SEVERAL DAYS
4. TROUBLE RELAXING: SEVERAL DAYS
6. BECOMING EASILY ANNOYED OR IRRITABLE: SEVERAL DAYS
5. BEING SO RESTLESS THAT IT IS HARD TO SIT STILL: MORE THAN HALF THE DAYS
1. FEELING NERVOUS, ANXIOUS, OR ON EDGE: SEVERAL DAYS
7. FEELING AFRAID AS IF SOMETHING AWFUL MIGHT HAPPEN: SEVERAL DAYS
5. BEING SO RESTLESS THAT IT IS HARD TO SIT STILL: SEVERAL DAYS
7. FEELING AFRAID AS IF SOMETHING AWFUL MIGHT HAPPEN: NOT AT ALL
GAD7 TOTAL SCORE: 12
GAD7 TOTAL SCORE: 6

## 2023-07-13 ASSESSMENT — PAIN SCALES - GENERAL
PAINLEVEL: EXTREME PAIN (8)
PAINLEVEL: EXTREME PAIN (8)

## 2023-07-13 NOTE — LETTER
7/13/2023         RE: Harrison Thomas  [address]  [place] [address]        Dear Colleague,    Thank you for referring your patient, Harrison Thomas, to the [facility]. Please see a copy of my visit note below.    Neurosurgery follow-up    Mr. Thomas is a 77-year-old male who presents to clinic for 1 month follow-up of right frontal subarachnoid hemorrhage.  He states he is feeling well, he has resumed his aspirin 81 mg.  He denies any further falls.    Exam    B/P: 121/61, T: Data Unavailable, P: 66, R: Data Unavailable     Alert and oriented no acute distress  Moving all extremities  Finger-nose slow and accurate  Negative pronator drift  Extraocular movements intact  Pupils equal and reactive        Imaging    Head CT scan demonstrated resolution of the previously seen intracranial hemorrhage.  No acute hemorrhage noted today.    Assessment    Resolved subarachnoid hemorrhage      Plan    No further follow-up with neurosurgery is needed, activity as tolerated,.              Again, thank you for allowing me to participate in the care of your patient.        Sincerely,        Kee Morales PA-C

## 2023-07-13 NOTE — PROGRESS NOTES
This very nice patient Harrison is accompanied by his wife Adeola  This time for a follow-up of the right hip fracture and rib fracture follow-up  His back is also hurting and he would like to have an oxycodone refill which I have done that  We will also give gabapentin twice a day and he will continue rehab  He is already on duloxetine    In regards to his frontal hematoma and subarachnoid hemorrhage I showed them the CT head films and he has a repeat CT scheduled tomorrow    He had blood lost as expected with the hip hemorrhage and we will like to check CBC and iron studies  Is also on B12 supplementation and we will check the levels      In regards to his metastasis to the lung from thyroid cancer he has a repeat CT chest coming up    Most importantly he needs treatment for osteoporosis and we will get a DEXA scan      For his itching we will check a liver profile and we might have to use Atarax  We discussed about medical marijuana but patient does not have interest in that    ICD-10-CM    1. COPD, very severe (H)  J44.9       2. Type I or II open fracture of right hip requiring operative repair with routine healing, subsequent encounter  S72.001E DX Hip/Pelvis/Spine     gabapentin (NEURONTIN) 300 MG capsule     oxyCODONE (ROXICODONE) 5 MG tablet      3. SAH (subarachnoid hemorrhage) (H)  I60.9       4. Metastasis from thyroid cancer (H)  C79.9     C73       5. Coronary artery disease involving native coronary artery of native heart without angina pectoris  I25.10       6. Rheumatoid arthritis involving multiple sites with positive rheumatoid factor (H)  M05.79 Comprehensive metabolic panel (BMP + Alb, Alk Phos, ALT, AST, Total. Bili, TP)     Comprehensive metabolic panel (BMP + Alb, Alk Phos, ALT, AST, Total. Bili, TP)      7. Chronic right-sided low back pain with right-sided sciatica  M54.41 gabapentin (NEURONTIN) 300 MG capsule    G89.29       8. Essential hypertension, benign  I10       9. Senile osteoporosis   M81.0 DX Hip/Pelvis/Spine      10. Chronic fatigue  R53.82 TSH with free T4 reflex      11. Anemia due to blood loss, acute  D62 Vitamin B12     CBC with Platelets       Iron & Iron Binding Capacity     Iron & Iron Binding Capacity     CBC with Platelets       Vitamin B12      12. Itching  L29.9 Comprehensive metabolic panel (BMP + Alb, Alk Phos, ALT, AST, Total. Bili, TP)     Comprehensive metabolic panel (BMP + Alb, Alk Phos, ALT, AST, Total. Bili, TP)      13. Closed fracture of neck of right femur, initial encounter (H)  S72.001A aspirin 81 MG EC tablet            Artur Terry is a 77 year old, presenting for the following health issues:  Follow Up (COPD; depression; fatigue; thyroid)    Hospital and nursing home course is reviewed  Patient had fallen when he was walking without a cane in the nursing home and hit his head and injured his ribs head as well as right hip fracture in the femur neck  Surgical site is healing well and he is in a lot of pain  Rib pain is there but back pain is improving with physical therapy    His breathing is comfortable and he is on 2 mg of prednisone  He remains on baby aspirin twice a day which she plans to change to once a day after 42 days  He remains tired  His coronary disease is stable  He has lung scan coming up  He has itching all over and is tired  History of Present Illness       Back Pain:  He presents for follow up of back pain. Patient's back pain is a recurring problem.  Location of back pain:  Right lower back, right middle of back, right buttock, right hip and right side of waist  Description of back pain: burning, sharp and shooting  Back pain spreads: right buttocks and right thigh    Since patient first noticed back pain, pain is: gradually worsening  Does back pain interfere with his job:  Not applicable      COPD:  He presents for follow up of COPD.  Overall, COPD symptoms are stable since last visit. He has same as usual fatigue or shortness of breath  "with exertion and same as usual shortness of breath at rest.  He rarely coughs and does have change in sputum. No recent fever. He can walk the length of 1-2 rooms without stopping to rest. He can walk 1 flights of stairs without resting.The patient has had no ED, urgent care, or hospital admissions because of COPD since the last visit.     He eats 2-3 servings of fruits and vegetables daily.He consumes 0 sweetened beverage(s) daily.He exercises with enough effort to increase his heart rate 10 to 19 minutes per day.  He exercises with enough effort to increase his heart rate 3 or less days per week.   He is taking medications regularly.               Review of Systems   10 point ROS of systems including Constitutional, Eyes, Respiratory, Cardiovascular, Gastroenterology, Genitourinary, Integumentary, Muscularskeletal, Psychiatric were all negative except for pertinent positives noted in my HPI.        Objective    /70 (BP Location: Right arm, Patient Position: Sitting, Cuff Size: Adult Large)   Pulse 50   Temp 97.7  F (36.5  C)   Resp 16   Ht 1.778 m (5' 10\")   Wt 78.5 kg (173 lb)   SpO2 95%   BMI 24.82 kg/m    Body mass index is 24.82 kg/m .  Physical Exam   GENERAL: healthy, alert and no distress  NECK: no adenopathy, no asymmetry, masses, or scars and thyroid normal to palpation  RESP: lungs clear to auscultation - no rales, rhonchi or wheezes  CV: regular rate and rhythm, normal S1 S2, no S3 or S4, no murmur, click or rub, no peripheral edema and peripheral pulses strong  ABDOMEN: soft, nontender, no hepatosplenomegaly, no masses and bowel sounds normal  MS: no gross musculoskeletal defects noted, no edema    It is healing well and he can barely stand    Patient Active Problem List   Diagnosis     Essential hypertension, benign     Pain in joint, upper arm     Allergic rhinitis     Pain in joint, lower leg     Chronic airway obstruction (H)     Monoclonal paraproteinemia     Immunodeficiency (H)     " Eczema     COPD (chronic obstructive pulmonary disease) (H)     Transient cerebral ischemia     Bunion     Occipital neuralgia     Hyperlipidemia LDL goal <100     Health Care Home     Low back pain     Olecranon bursitis of right elbow     Bruit     Retina hole     signed & scanned on 09/23/2011  (1-4-2013 printed but not scanned in)      Chronic, continuous use of opioids     Atherosclerosis of native coronary artery of native heart without angina pectoris     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, unspecified thyrotoxicosis type     Right-sided low back pain with right-sided sciatica     Coronary artery disease involving native coronary artery of native heart without angina pectoris     BRENNEN (obstructive sleep apnea)     Rheumatoid arthritis (H)     Hammer toe of right foot     Hallux limitus of right foot     Corn of toe     Non-recurrent unilateral inguinal hernia with obstruction without gangrene     Left inguinal hernia     Metastasis from thyroid cancer (H)     Pulmonary nodules     Preoperative examination     Chronic pain disorder     Closed fracture of one rib     Acute on chronic respiratory failure with hypoxia (H)     Closed fracture of one rib of left side, initial encounter     Dehydration     Shock (H)     CALLUM (acute kidney injury) (H)     COPD, very severe (H)     Acute kidney failure with tubular necrosis (H)     Hypokalemia     Hypoxia     Spleen hematoma, initial encounter     Fall, initial encounter     SAH (subarachnoid hemorrhage) (H)     Facial laceration, initial encounter     Closed fracture of neck of right femur, initial encounter (H)     Current Outpatient Medications   Medication     acetaminophen (TYLENOL) 500 MG tablet     albuterol (ACCUNEB) 1.25 MG/3ML neb solution     albuterol (PROAIR HFA/PROVENTIL HFA/VENTOLIN HFA) 108 (90 Base) MCG/ACT inhaler     amLODIPine (NORVASC) 5 MG tablet     ascorbic acid 1000 MG TABS tablet     aspirin 81 MG EC tablet     azithromycin (ZITHROMAX) 500 MG  tablet     calcium carbonate (OS-KARLIE) 1500 (600 Ca) MG tablet     cholecalciferol 25 MCG (1000 UT) TABS     diclofenac (VOLTAREN) 1 % topical gel     DULoxetine (CYMBALTA) 20 MG capsule     Fluticasone-Umeclidin-Vilant (TRELEGY ELLIPTA) 100-62.5-25 MCG/ACT oral inhaler     furosemide (LASIX) 20 MG tablet     gabapentin (NEURONTIN) 300 MG capsule     guaiFENesin (MUCINEX) 600 MG 12 hr tablet     hydrocortisone 2.5 % cream     levothyroxine (SYNTHROID/LEVOTHROID) 137 MCG tablet     Lidocaine (LIDOCARE) 4 % Patch     MegaRed Omega-3 Krill Oil 500 MG CAPS     Melatonin 10 MG TABS tablet     metoprolol succinate ER (TOPROL XL) 50 MG 24 hr tablet     multivitamin w/minerals (THERA-VIT-M) tablet     naloxone (NARCAN) 4 MG/0.1ML nasal spray     nitroGLYcerin (NITROSTAT) 0.4 MG sublingual tablet     oxyCODONE (ROXICODONE) 5 MG tablet     polyethylene glycol (MIRALAX) 17 g packet     potassium chloride ER (KLOR-CON M) 20 MEQ CR tablet     predniSONE (DELTASONE) 1 MG tablet     rosuvastatin (CRESTOR) 10 MG tablet     senna-docusate (SENOKOT-S/PERICOLACE) 8.6-50 MG tablet     traZODone (DESYREL) 50 MG tablet     No current facility-administered medications for this visit.   '      Lab Results   Component Value Date    A1C 5.3 06/15/2023    A1C 5.7 11/15/2022    A1C 5.1 11/01/2021     LDL Cholesterol Calculated   Date Value Ref Range Status   05/04/2023 69 <=100 mg/dL Final   03/03/2021 83 <100 mg/dL Final     Comment:     Desirable:       <100 mg/dl     Creatinine   Date Value Ref Range Status   06/21/2023 0.83 0.67 - 1.17 mg/dL Final   07/02/2021 0.89 0.66 - 1.25 mg/dL Final     Lab Results   Component Value Date    ALT 26 01/23/2023    ALT 23 06/25/2021     Lab Results   Component Value Date    HGB 10.6 06/21/2023    HGB 13.2 07/02/2021

## 2023-07-13 NOTE — PROGRESS NOTES
Neurosurgery follow-up    Mr. Thomas is a 77-year-old male who presents to clinic for 1 month follow-up of right frontal subarachnoid hemorrhage.  He states he is feeling well, he has resumed his aspirin 81 mg.  He denies any further falls.    Exam    B/P: 121/61, T: Data Unavailable, P: 66, R: Data Unavailable     Alert and oriented no acute distress  Moving all extremities  Finger-nose slow and accurate  Negative pronator drift  Extraocular movements intact  Pupils equal and reactive        Imaging    Head CT scan demonstrated resolution of the previously seen intracranial hemorrhage.  No acute hemorrhage noted today.    Assessment    Resolved subarachnoid hemorrhage      Plan    No further follow-up with neurosurgery is needed, activity as tolerated,.

## 2023-07-13 NOTE — NURSING NOTE
"Harrison Thomas is a 77 year old male who presents for:  Chief Complaint   Patient presents with     RECHECK     Review new imaging         Initial Vitals:  /61   Pulse 66   SpO2 93%  Estimated body mass index is 24.82 kg/m  as calculated from the following:    Height as of an earlier encounter on 7/13/23: 5' 10\" (1.778 m).    Weight as of an earlier encounter on 7/13/23: 173 lb (78.5 kg).. There is no height or weight on file to calculate BSA. BP completed using cuff size: regular  Extreme Pain (8)    Nursing Comments:       Nadine Rdz    "

## 2023-07-18 ENCOUNTER — OFFICE VISIT (OUTPATIENT)
Dept: SLEEP MEDICINE | Facility: CLINIC | Age: 78
End: 2023-07-18
Payer: MEDICARE

## 2023-07-18 VITALS
BODY MASS INDEX: 25.05 KG/M2 | HEIGHT: 70 IN | OXYGEN SATURATION: 96 % | DIASTOLIC BLOOD PRESSURE: 68 MMHG | HEART RATE: 64 BPM | SYSTOLIC BLOOD PRESSURE: 126 MMHG | WEIGHT: 175 LBS

## 2023-07-18 DIAGNOSIS — G47.36 HYPOXEMIA ASSOCIATED WITH SLEEP: ICD-10-CM

## 2023-07-18 DIAGNOSIS — G47.33 OBSTRUCTIVE SLEEP APNEA: Primary | ICD-10-CM

## 2023-07-18 DIAGNOSIS — F51.04 CHRONIC INSOMNIA: ICD-10-CM

## 2023-07-18 PROCEDURE — 99204 OFFICE O/P NEW MOD 45 MIN: CPT | Performed by: INTERNAL MEDICINE
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
## 2023-07-18 NOTE — NURSING NOTE
"Chief Complaint   Patient presents with     Sleep Problem     Discuss Inspire       Initial /68   Pulse 64   Ht 1.778 m (5' 10\")   Wt 79.4 kg (175 lb)   SpO2 96%   BMI 25.11 kg/m   Estimated body mass index is 25.11 kg/m  as calculated from the following:    Height as of this encounter: 1.778 m (5' 10\").    Weight as of this encounter: 79.4 kg (175 lb).    Medication Reconciliation: complete  ESS 8  Neck circumference: 43 centimeters.  Kristine Ross MA  "

## 2023-07-24 ENCOUNTER — ANCILLARY PROCEDURE (OUTPATIENT)
Dept: CT IMAGING | Facility: CLINIC | Age: 78
End: 2023-07-24
Attending: THORACIC SURGERY (CARDIOTHORACIC VASCULAR SURGERY)
Payer: MEDICARE

## 2023-07-24 ENCOUNTER — TRANSFERRED RECORDS (OUTPATIENT)
Dept: HEALTH INFORMATION MANAGEMENT | Facility: CLINIC | Age: 78
End: 2023-07-24

## 2023-07-24 DIAGNOSIS — R91.8 MULTIPLE LUNG NODULES: ICD-10-CM

## 2023-07-24 PROCEDURE — 71250 CT THORAX DX C-: CPT

## 2023-08-09 ENCOUNTER — DOCUMENTATION ONLY (OUTPATIENT)
Dept: FAMILY MEDICINE | Facility: CLINIC | Age: 78
End: 2023-08-09
Payer: MEDICARE

## 2023-08-09 DIAGNOSIS — J44.9 CHRONIC OBSTRUCTIVE PULMONARY DISEASE, UNSPECIFIED COPD TYPE (H): Primary | ICD-10-CM

## 2023-08-16 DIAGNOSIS — S72.001E: ICD-10-CM

## 2023-08-16 NOTE — TELEPHONE ENCOUNTER
TO PCP    Pt called nurse triage line asking for refill of oxycodone. Writer found medication as historical on med list.    RX and pharmacy pended for your review and approval if appropriate.    BENJAMÍN DUMONT RN on 8/16/2023 at 2:44 PM

## 2023-08-17 RX ORDER — OXYCODONE HYDROCHLORIDE 5 MG/1
5 TABLET ORAL EVERY 6 HOURS PRN
Qty: 90 TABLET | Refills: 0 | Status: SHIPPED | OUTPATIENT
Start: 2023-08-17 | End: 2023-09-19

## 2023-09-05 ENCOUNTER — TELEPHONE (OUTPATIENT)
Dept: FAMILY MEDICINE | Facility: CLINIC | Age: 78
End: 2023-09-05
Payer: MEDICARE

## 2023-09-05 NOTE — TELEPHONE ENCOUNTER
Order/Referral Request    Who is requesting: Pt    Orders being requested: labs, if PCP deems fit    Reason service is needed/diagnosis: Pt is already scheduled for a lab sher[facility] for 09.08.23 to have labd done for Dr. Simmons, and is wondering if PCP would like any labs done as ling as he is in    When are orders needed by: 09.08.23 at 10:45am    Has this been discussed with Provider: No    Does patient have a preference on a Group/Provider/Facility? [facility]    Does patient have an appointment scheduled?: Yes: 09.08.23 at 10:45am    Where to send orders: Place orders within Epic    Could we [facility] this [facility] you [facility] or would you prefer to receive a phone call?:   No preference   Okay to leave a detailed message?: Yes at Cell number on file:    Telephone Information:   Mobile [phone]

## 2023-09-08 ENCOUNTER — LAB (OUTPATIENT)
Dept: LAB | Facility: CLINIC | Age: 78
End: 2023-09-08
Payer: MEDICARE

## 2023-09-08 DIAGNOSIS — C73 PAPILLARY THYROID CARCINOMA (H): ICD-10-CM

## 2023-09-08 DIAGNOSIS — E89.0 POSTSURGICAL HYPOTHYROIDISM: ICD-10-CM

## 2023-09-08 LAB
CALCIUM SERPL-MCNC: 9.7 MG/DL (ref 8.8–10.2)
T4 FREE SERPL-MCNC: 2.55 NG/DL (ref 0.9–1.7)
TSH SERPL DL<=0.005 MIU/L-ACNC: 0.19 UIU/ML (ref 0.3–4.2)

## 2023-09-08 PROCEDURE — 84432 ASSAY OF THYROGLOBULIN: CPT | Mod: 90

## 2023-09-08 PROCEDURE — 84439 ASSAY OF FREE THYROXINE: CPT

## 2023-09-08 PROCEDURE — 99000 SPECIMEN HANDLING OFFICE-LAB: CPT

## 2023-09-08 PROCEDURE — 36415 COLL VENOUS BLD VENIPUNCTURE: CPT

## 2023-09-08 PROCEDURE — 84443 ASSAY THYROID STIM HORMONE: CPT

## 2023-09-08 PROCEDURE — 82310 ASSAY OF CALCIUM: CPT

## 2023-09-08 PROCEDURE — 86800 THYROGLOBULIN ANTIBODY: CPT | Mod: 90

## 2023-09-16 ENCOUNTER — PATIENT OUTREACH (OUTPATIENT)
Dept: CARE COORDINATION | Facility: CLINIC | Age: 78
End: 2023-09-16
Payer: MEDICARE
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
## 2023-09-19 DIAGNOSIS — S72.001E: ICD-10-CM

## 2023-09-19 RX ORDER — OXYCODONE HYDROCHLORIDE 5 MG/1
5 TABLET ORAL EVERY 6 HOURS PRN
Qty: 90 TABLET | Refills: 0 | Status: SHIPPED | OUTPATIENT
Start: 2023-09-19 | End: 2023-10-13

## 2023-09-19 NOTE — TELEPHONE ENCOUNTER
Medication Question or Refill        What medication are you calling about (include dose and sig)?:   oxyCODONE (ROXICODONE) 5 MG tablet     Preferred Pharmacy:   [facility]/pharmacy #[id number] - [facility] [address] [place] [id number]  Phone: [phone] Fax: [phone]    Controlled Substance Agreement on file:   CSA -- Patient Level:     [Media Unavailable] Controlled Substance Agreement - Opioid - Scan on 3/22/2023  2:37 PM   [Media Unavailable] Controlled Substance Agreement - Opioid - Scan on 3/22/2023  2:37 PM   [Media Unavailable] Controlled Substance Agreement - Opioid - Scan on 3/22/2023  2:37 PM   [Media Unavailable] Controlled Substance Agreement - Opioid - Scan on 3/22/2023  2:37 PM   [Media Unavailable] Controlled Substance Agreement - Opioid - Scan on 3/22/2023  2:37 PM   [Media Unavailable] Controlled Substance Agreement - Opioid - Scan on 11/1/2021  1:44 PM: 11/1/21       Who prescribed the medication?: PCP    Do you need a refill? Yes    When did you use the medication last? 09.18.23    Patient offered an appointment? No, next OV 10.13.23    Do you have any questions or concerns?  No      Could we send this information to you in [facility] or would you prefer to receive a phone call?:   No preference   Okay to leave a detailed message?: Yes at Cell number on file:    Telephone Information:   Mobile [phone]

## 2023-09-20 LAB — SCANNED LAB RESULT: ABNORMAL

## 2023-09-22 ASSESSMENT — ENCOUNTER SYMPTOMS
DYSPNEA ON EXERTION: 1
BACK PAIN: 1
MYALGIAS: 1
TREMORS: 0
WHEEZING: 1
SNORES LOUDLY: 1
STIFFNESS: 0
DECREASED CONCENTRATION: 0
INSOMNIA: 1
BRUISES/BLEEDS EASILY: 1
DEPRESSION: 1
COUGH DISTURBING SLEEP: 0
NECK PAIN: 0
COUGH: 0
MEMORY LOSS: 0
WEAKNESS: 1
SPUTUM PRODUCTION: 0
MUSCLE CRAMPS: 0
SPEECH CHANGE: 0
PANIC: 0
SWOLLEN GLANDS: 0
ARTHRALGIAS: 1
DISTURBANCES IN COORDINATION: 1
SHORTNESS OF BREATH: 1
MUSCLE WEAKNESS: 1
JOINT SWELLING: 0
TINGLING: 0
HEADACHES: 0
HEMOPTYSIS: 0
LOSS OF CONSCIOUSNESS: 0
POSTURAL DYSPNEA: 0
SEIZURES: 0
DIZZINESS: 1
NUMBNESS: 0
PARALYSIS: 0
NERVOUS/ANXIOUS: 1

## 2023-09-24 DIAGNOSIS — C73 PAPILLARY THYROID CARCINOMA (H): Primary | ICD-10-CM

## 2023-09-24 DIAGNOSIS — E89.0 POSTSURGICAL HYPOTHYROIDISM: ICD-10-CM

## 2023-09-24 RX ORDER — LEVOTHYROXINE SODIUM 125 UG/1
125 TABLET ORAL DAILY
Qty: 90 TABLET | Refills: 1 | Status: SHIPPED | OUTPATIENT
Start: 2023-09-24 | End: 2023-12-03

## 2023-09-28 ENCOUNTER — TELEPHONE (OUTPATIENT)
Dept: PULMONOLOGY | Facility: CLINIC | Age: 78
End: 2023-09-28
Payer: MEDICARE

## 2023-09-28 NOTE — TELEPHONE ENCOUNTER
[facility] Call Center    Phone Message    May a detailed message be left on voicemail: yes     Reason for Call: Other: patient called to cancel his 6 minute walk test for 9/29/2023 as he has had hip surgery and cannot walk very far, please advise as seen fit, patient is scheduled to see Dr Handy tomorrow as well     Action Taken: Other: pulm    Travel Screening: Not Applicable

## 2023-09-29 ENCOUNTER — OFFICE VISIT (OUTPATIENT)
Dept: PULMONOLOGY | Facility: CLINIC | Age: 78
End: 2023-09-29
Attending: INTERNAL MEDICINE
Payer: MEDICARE

## 2023-09-29 VITALS
SYSTOLIC BLOOD PRESSURE: 158 MMHG | HEART RATE: 62 BPM | BODY MASS INDEX: 24.97 KG/M2 | WEIGHT: 174 LBS | OXYGEN SATURATION: 92 % | DIASTOLIC BLOOD PRESSURE: 77 MMHG

## 2023-09-29 DIAGNOSIS — J42 CHRONIC BRONCHITIS, UNSPECIFIED CHRONIC BRONCHITIS TYPE (H): ICD-10-CM

## 2023-09-29 DIAGNOSIS — R06.00 DYSPNEA, UNSPECIFIED TYPE: Primary | ICD-10-CM

## 2023-09-29 DIAGNOSIS — J70.0 RADIATION PNEUMONITIS (H): ICD-10-CM

## 2023-09-29 DIAGNOSIS — J98.4 CAVITATING MASS IN RIGHT UPPER LUNG LOBE: ICD-10-CM

## 2023-09-29 DIAGNOSIS — G47.33 OBSTRUCTIVE SLEEP APNEA: ICD-10-CM

## 2023-09-29 DIAGNOSIS — J43.8 OTHER EMPHYSEMA (H): ICD-10-CM

## 2023-09-29 DIAGNOSIS — Z85.850 HX OF PAPILLARY THYROID CARCINOMA: ICD-10-CM

## 2023-09-29 PROCEDURE — G0463 HOSPITAL OUTPT CLINIC VISIT: HCPCS | Performed by: INTERNAL MEDICINE

## 2023-09-29 PROCEDURE — 99215 OFFICE O/P EST HI 40 MIN: CPT | Performed by: INTERNAL MEDICINE

## 2023-09-29 ASSESSMENT — PAIN SCALES - GENERAL: PAINLEVEL: NO PAIN (0)

## 2023-09-29 NOTE — NURSING NOTE
Chief Complaint   Patient presents with    Follow Up     Return appt      Vitals were taken and medications were reconciled.     Arina Sesay RMA  9:00 AM

## 2023-09-29 NOTE — PATIENT INSTRUCTIONS
COPD - Severe; with nighttime oxygen   Lung mass - s/p Radiation therapy  Thyroid CA with metastasis  Recent hip Fracture  Small brain bleed (subarachnoid hemorrhage)    Plan  Continue Trelegy - discussed increased to 200 strength from 100 but will hold off for now  Trial of using 2 puffs of rescue inhaler 10 minutes before exertion - see if this helps or not;  Please call our nurses and report result (either way)  Nurse line for reporting and other questions and comments [phone]  For sleep apnea mandibular advancing device recommend Dr. Tino Fonseca - sleep dentist  Recommend new updated COVID vaccine (got new flu shot and RSV)  Consider need for portable home oxygen to use with exertion - may need better documentation to get insurance coverage  Return to clinic 6-8 months
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
## 2023-09-29 NOTE — CONFIDENTIAL NOTE
Writer did not get message until 9/29 (day of appointment) - no time to call to address canceling 6MWT as he is already here for provider visit with Dr. Handy.   Allen Blanco), Medicine  [address] [facility]  [address] [place] [address]  Phone: [phone]  Fax: [phone]
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
## 2023-10-13 ENCOUNTER — OFFICE VISIT (OUTPATIENT)
Dept: FAMILY MEDICINE | Facility: CLINIC | Age: 78
End: 2023-10-13
Payer: MEDICARE

## 2023-10-13 VITALS
HEIGHT: 70 IN | OXYGEN SATURATION: 94 % | DIASTOLIC BLOOD PRESSURE: 78 MMHG | WEIGHT: 177 LBS | RESPIRATION RATE: 22 BRPM | BODY MASS INDEX: 25.34 KG/M2 | HEART RATE: 57 BPM | TEMPERATURE: 96.9 F | SYSTOLIC BLOOD PRESSURE: 162 MMHG

## 2023-10-13 DIAGNOSIS — C78.00 MALIGNANT NEOPLASM METASTATIC TO LUNG, UNSPECIFIED LATERALITY (H): ICD-10-CM

## 2023-10-13 DIAGNOSIS — D84.9 IMMUNODEFICIENCY (H): ICD-10-CM

## 2023-10-13 DIAGNOSIS — I25.10 CORONARY ARTERY DISEASE INVOLVING NATIVE CORONARY ARTERY OF NATIVE HEART WITHOUT ANGINA PECTORIS: ICD-10-CM

## 2023-10-13 DIAGNOSIS — F43.21 SITUATIONAL DEPRESSION: ICD-10-CM

## 2023-10-13 DIAGNOSIS — M05.79 RHEUMATOID ARTHRITIS INVOLVING MULTIPLE SITES WITH POSITIVE RHEUMATOID FACTOR (H): ICD-10-CM

## 2023-10-13 DIAGNOSIS — S72.002D CLOSED FRACTURE OF BOTH HIPS WITH ROUTINE HEALING, SUBSEQUENT ENCOUNTER: ICD-10-CM

## 2023-10-13 DIAGNOSIS — S72.001D CLOSED FRACTURE OF BOTH HIPS WITH ROUTINE HEALING, SUBSEQUENT ENCOUNTER: ICD-10-CM

## 2023-10-13 DIAGNOSIS — G89.29 CHRONIC RIGHT-SIDED LOW BACK PAIN WITH RIGHT-SIDED SCIATICA: ICD-10-CM

## 2023-10-13 DIAGNOSIS — I10 ESSENTIAL HYPERTENSION, BENIGN: ICD-10-CM

## 2023-10-13 DIAGNOSIS — M54.41 CHRONIC RIGHT-SIDED LOW BACK PAIN WITH RIGHT-SIDED SCIATICA: ICD-10-CM

## 2023-10-13 DIAGNOSIS — J44.9 CHRONIC OBSTRUCTIVE PULMONARY DISEASE, UNSPECIFIED COPD TYPE (H): ICD-10-CM

## 2023-10-13 DIAGNOSIS — S72.001E: ICD-10-CM

## 2023-10-13 DIAGNOSIS — Z00.00 ENCOUNTER FOR MEDICARE ANNUAL WELLNESS EXAM: Primary | ICD-10-CM

## 2023-10-13 DIAGNOSIS — M81.0 SENILE OSTEOPOROSIS: ICD-10-CM

## 2023-10-13 DIAGNOSIS — F41.1 GAD (GENERALIZED ANXIETY DISORDER): ICD-10-CM

## 2023-10-13 PROCEDURE — 99214 OFFICE O/P EST MOD 30 MIN: CPT | Mod: 25 | Performed by: INTERNAL MEDICINE

## 2023-10-13 PROCEDURE — G0439 PPPS, SUBSEQ VISIT: HCPCS | Performed by: INTERNAL MEDICINE

## 2023-10-13 RX ORDER — OXYCODONE HYDROCHLORIDE 5 MG/1
5 TABLET ORAL EVERY 6 HOURS PRN
Qty: 90 TABLET | Refills: 0 | Status: SHIPPED | OUTPATIENT
Start: 2023-10-13 | End: 2023-11-28

## 2023-10-13 RX ORDER — PREDNISONE 20 MG/1
TABLET ORAL
Qty: 20 TABLET | Refills: 0 | Status: SHIPPED | OUTPATIENT
Start: 2023-10-13 | End: 2023-12-06

## 2023-10-13 RX ORDER — DULOXETIN HYDROCHLORIDE 20 MG/1
20 CAPSULE, DELAYED RELEASE ORAL 2 TIMES DAILY
Qty: 180 CAPSULE | Refills: 3 | Status: SHIPPED | OUTPATIENT
Start: 2023-10-13 | End: 2024-04-26

## 2023-10-13 RX ORDER — ALENDRONATE SODIUM 70 MG/1
70 TABLET ORAL
Qty: 12 TABLET | Refills: 3 | Status: SHIPPED | OUTPATIENT
Start: 2023-10-13 | End: 2024-08-20

## 2023-10-13 ASSESSMENT — ACTIVITIES OF DAILY LIVING (ADL): CURRENT_FUNCTION: NO ASSISTANCE NEEDED

## 2023-10-13 ASSESSMENT — PAIN SCALES - GENERAL: PAINLEVEL: NO PAIN (0)

## 2023-10-13 ASSESSMENT — PATIENT HEALTH QUESTIONNAIRE - PHQ9: SUM OF ALL RESPONSES TO PHQ QUESTIONS 1-9: 2
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
## 2023-10-13 NOTE — PROGRESS NOTES
The patient was provided with suggestions to help him develop a healthy physical lifestyle.  He is at risk for lack of exercise and has been provided with information to increase physical activity for the benefit of his well-being.

## 2023-10-13 NOTE — PATIENT INSTRUCTIONS
Patient Education   Personalized Prevention Plan  You are due for the preventive services outlined below.  Your care team is available to assist you in scheduling these services.  If you have already completed any of these items, please share that information with your care team to update in your medical record.  Health Maintenance Due   Topic Date Due     Hepatitis A Vaccine (1 of 2 - Risk 2-dose series) Never done     COPD Action Plan  07/28/2018     Annual Wellness Visit  05/02/2023     COVID-19 Vaccine (7 - 2023-24 season) 09/01/2023     Your Health Risk Assessment indicates you feel you are not in good health    A healthy lifestyle helps keep the body fit and the mind alert. It helps protect you from disease, helps you fight disease, and helps prevent chronic disease (disease that doesn't go away) from getting worse. This is important as you get older and begin to notice twinges in muscles and joints and a decline in the strength and stamina you once took for granted. A healthy lifestyle includes good healthcare, good nutrition, weight control, recreation, and regular exercise. Avoid harmful substances and do what you can to keep safe. Another part of a healthy lifestyle is stay mentally active and socially involved.    Good healthcare   Have a wellness visit every year.   If you have new symptoms, let us know right away. Don't wait until the next checkup.   Take medicines exactly as prescribed and keep your medicines in a safe place. Tell us if your medicine causes problems.   Healthy diet and weight control   Eat 3 or 4 small, nutritious, low-fat, high-fiber meals a day. Include a variety of fruits, vegetables, and whole-grain foods.   Make sure you get enough calcium in your diet. Calcium, vitamin D, and exercise help prevent osteoporosis (bone thinning).   If you live alone, try eating with others when you can. That way you get a good meal and have company while you eat it.   Try to keep a healthy weight.  "If you eat more calories than your body uses for energy, it will be stored as fat and you will gain weight.     Recreation   Recreation is not limited to sports and team events. It includes any activity that provides relaxation, interest, enjoyment, and exercise. Recreation provides an outlet for physical, mental, and social energy. It can give a sense of worth and achievement. It can help you stay healthy.    Mental Exercise and Social Involvement  Mental and emotional health is as important as physical health. Keep in touch with friends and family. Stay as active as possible. Continue to learn and challenge yourself.   Things you can do to stay mentally active are:  Learn something new, like a foreign language or musical instrument.   Play SCRABBLE or do crossword puzzles. If you cannot find people to play these games with you at home, you can play them with others on your computer through the Internet.   Join a games club--anything from card games to chess or checkers or lawn bowling.   Start a new hobby.   Go back to school.   Volunteer.   Read.   Keep up with world events.  Learning About Being Physically Active  What is physical activity?     Being physically active means doing any kind of activity that gets your body moving.  The types of physical activity that can help you get fit and stay healthy include:  Aerobic or \"cardio\" activities. These make your heart beat faster and make you breathe harder, such as brisk walking, riding a bike, or running. They strengthen your heart and lungs and build up your endurance.  Strength training activities. These make your muscles work against, or \"resist,\" something. Examples include lifting weights or doing push-ups. These activities help tone and strengthen your muscles and bones.  Stretches. These let you move your joints and muscles through their full range of motion. Stretching helps you be more flexible.  Reaching a balance between these three types of physical " "activity is important because each one contributes to your overall fitness.  What are the benefits of being active?  Being active is one of the best things you can do for your health. It helps you to:  Feel stronger and have more energy to do all the things you like to do.  Focus better at school or work.  Feel, think, and sleep better.  Reach and stay at a healthy weight.  Lose fat and build lean muscle.  Lower your risk for serious health problems, including diabetes, heart attack, high blood pressure, and some cancers.  Keep your heart, lungs, bones, muscles, and joints strong and healthy.  How can you make being active part of your life?  Start slowly. Make it your long-term goal to get at least 30 minutes of exercise on most days of the week. Walking is a good choice. You also may want to do other activities, such as running, swimming, cycling, or playing tennis or team sports.  Pick activities that you like--ones that make your heart beat faster, your muscles stronger, and your muscles and joints more flexible. If you find more than one thing you like doing, do them all. You don't have to do the same thing every day.  Get your heart pumping every day. Any activity that makes your heart beat faster and keeps it at that rate for a while counts.  Here are some great ways to get your heart beating faster:  Go for a brisk walk, run, or bike ride.  Go for a hike or swim.  Go in-line skating.  Play a game of touch football, basketball, or soccer.  Ride a bike.  Play tennis or racquetball.  Climb stairs.  Even some household chores can be aerobic--just do them at a faster pace. Vacuuming, raking or mowing the lawn, sweeping the garage, and washing and waxing the car all can help get your heart rate up.  Strengthen your muscles during the week. You don't have to lift heavy weights or grow big, bulky muscles to get stronger. Doing a few simple activities that make your muscles work against, or \"resist,\" something can " "help you get stronger.  For example, you can:  Do push-ups or sit-ups, which use your own body weight as resistance.  Lift weights or dumbbells or use stretch bands at home or in a gym or community center.  Stretch your muscles often. Stretching will help you as you become more active. It can help you stay flexible, loosen tight muscles, and avoid injury. It can also help improve your balance and posture and can be a great way to relax.  Be sure to stretch the muscles you'll be using when you work out. It's best to warm your muscles slightly before you stretch them. Walk or do some other light aerobic activity for a few minutes, and then start stretching.  When you stretch your muscles:  Do it slowly. Stretching is not about going fast or making sudden movements.  Don't push or bounce during a stretch.  Hold each stretch for at least 15 to 30 seconds, if you can. You should feel a stretch in the muscle, but not pain.  Breathe out as you do the stretch. Then breathe in as you hold the stretch. Don't hold your breath.  If you're worried about how more activity might affect your health, have a checkup before you start. Follow any special advice your doctor gives you for getting a smart start.  Where can you learn more?  Go to https://www.[employer].net/patiented  Enter W332 in the search box to learn more about \"Learning About Being Physically Active.\"  Current as of: October 10, 2022               Content Version: 13.7    9528-4906 [employer].   Care instructions adapted under license by your healthcare professional. If you have questions about a medical condition or this instruction, always ask your healthcare professional. [employer] disclaims any warranty or liability for your use of this information.         "

## 2023-10-13 NOTE — NURSING NOTE
"BP:  BP (!) 162/78   Pulse 57   Temp 96.9  F (36.1  C) (Temporal)   Resp 22   Ht 1.778 m (5' 10\")   Wt 80.3 kg (177 lb)   SpO2 94%   BMI 25.40 kg/m       57  Disposition: provider notified while patient in the clinic     "

## 2023-10-16 NOTE — PROGRESS NOTES
06/08/23 0500   Appointment Info   Signing clinician's name / credentials Pinky Gold, PT, DPT   Visits Used 1/10   Medical Diagnosis At high risk for injury related to fall   PT Tx Diagnosis Force production deficit, sensory detection deficit   Quick Adds Certification   Progress Note/Certification   Start of Care Date 06/08/23   Onset of illness/injury or Date of Surgery 05/04/23   Therapy Frequency 2x/week   Predicted Duration 90 days   Certification date from 06/08/23   Certification date to 09/05/23   Progress Note Due Date 08/31/23   GOALS   PT Goals 2;3;4   PT Goal 1   Goal Identifier HEP   Goal Description Pt will be able to demonstrate HEP activities without need for cues from provider to demonstrate understanding and independence with HEP.   Rationale to maximize safety and independence with performance of ADLs and functional tasks   Target Date 08/31/23   PT Goal 2   Goal Identifier Functional mobility   Goal Description Pt will demonstrate a 4 point improvement on the FGA to demonstrate minimum clinically significant difference in score to demonstrate improved safety with functional mobility and decrease risk of falls.   Rationale to maximize safety and independence with performance of ADLs and functional tasks   Goal Progress Eval: 10/30   Target Date 08/31/23   PT Goal 3   Goal Identifier Gait speed   Goal Description Pt demonstrate a 0.12 second improvement in gait speed to demonstrate minimum clinically significant difference in score to demonstrate improved gait velocity.   Rationale to maximize safety and independence with performance of ADLs and functional tasks   Goal Progress Eval: 0.63m/s   Target Date 08/31/23   PT Goal 4   Goal Identifier LE strength   Goal Description Pt will be able to perform at least 3 STS without UE support to demonstrate appropriate LE strength for community dwelling adults.   Rationale to maximize safety and independence with performance of ADLs and functional  tasks   Goal Progress NT due to L hip pain   Target Date 08/31/23   Subjective Report   Subjective Report see eval   Treatment Interventions (PT)   Interventions Neuromuscular Re-education   Neuromuscular Re-education   PTRx Neuro Re-ed 1 Romberg Eyes Open   PTRx Neuro Re-ed 1 - Details No Notes   PTRx Neuro Re-ed 2 Alternating Step Ups   PTRx Neuro Re-ed 2 - Details No Notes   PTRx Neuro Re-ed 3 Tandem Stance   PTRx Neuro Re-ed 3 - Details No Notes   Neuromuscular re-ed of mvmt, balance, coord, kinesthetic sense, posture, proprioception minutes (16393) 12   Neuro Re-ed 1 Review of HEP   Neuro Re-ed 1 - Details Pt educated on the following exercise to perform at home. Demonstrated/verbalized understanding and given print out to take home.   Patient Response/Progress demonstrated understanding   Eval/Assessments   PT Eval, Moderate Complexity Minutes (80140) 36   Education   Learner/Method Patient;Demonstration;Pictures/Video;Listening   Plan   Home program PTRx   Plan for next session Progress static and dynamic balance   Total Session Time   Timed Code Treatment Minutes 12   Total Treatment Time (sum of timed and untimed services) 48         DISCHARGE  Reason for Discharge: Patient has failed to schedule further appointments.    Equipment Issued: None    Discharge Plan: Patient to continue home program.    Referring Provider: Yaneli Dozier

## 2023-10-26 ENCOUNTER — ANCILLARY PROCEDURE (OUTPATIENT)
Dept: CT IMAGING | Facility: CLINIC | Age: 78
End: 2023-10-26
Attending: INTERNAL MEDICINE
Payer: MEDICARE

## 2023-10-26 DIAGNOSIS — C78.00 MALIGNANT NEOPLASM METASTATIC TO LUNG, UNSPECIFIED LATERALITY (H): ICD-10-CM

## 2023-10-26 DIAGNOSIS — J44.9 CHRONIC OBSTRUCTIVE PULMONARY DISEASE, UNSPECIFIED COPD TYPE (H): ICD-10-CM

## 2023-10-26 PROCEDURE — 71250 CT THORAX DX C-: CPT | Mod: MG

## 2023-11-02 DIAGNOSIS — J44.9 COPD, VERY SEVERE (H): ICD-10-CM

## 2023-11-02 RX ORDER — AZITHROMYCIN 500 MG/1
500 TABLET, FILM COATED ORAL EVERY OTHER DAY
Qty: 31 TABLET | Refills: 3 | Status: SHIPPED | OUTPATIENT
Start: 2023-11-02 | End: 2024-01-22

## 2023-11-09 ENCOUNTER — OFFICE VISIT (OUTPATIENT)
Dept: RHEUMATOLOGY | Facility: CLINIC | Age: 78
End: 2023-11-09
Payer: MEDICARE

## 2023-11-09 VITALS
BODY MASS INDEX: 26.06 KG/M2 | RESPIRATION RATE: 22 BRPM | WEIGHT: 181.6 LBS | HEART RATE: 75 BPM | OXYGEN SATURATION: 91 % | DIASTOLIC BLOOD PRESSURE: 66 MMHG | SYSTOLIC BLOOD PRESSURE: 130 MMHG

## 2023-11-09 DIAGNOSIS — M05.79 RHEUMATOID ARTHRITIS INVOLVING MULTIPLE SITES WITH POSITIVE RHEUMATOID FACTOR (H): Primary | ICD-10-CM

## 2023-11-09 PROCEDURE — 99212 OFFICE O/P EST SF 10 MIN: CPT | Performed by: INTERNAL MEDICINE

## 2023-11-09 NOTE — PATIENT INSTRUCTIONS
RHEUMATOLOGY    [facility]  [address] [place] [address]  [place] [address]    Phone number: [phone]  Fax number: [phone]    If you need a medication refill, please contact us as you may need lab work and/or a follow up visit prior to your refill.      Thank you for choosing [facility]!    Kinjal Quinn CMA Rheumatology
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
## 2023-11-26 DIAGNOSIS — S72.001E: ICD-10-CM

## 2023-11-26 NOTE — TELEPHONE ENCOUNTER
Medication Question or Refill        What medication are you calling about (include dose and sig)?: OXYCODONE 5MG - 90 TABS     Preferred Pharmacy:   [facility]/pharmacy #[id number] - [facility] [address] [place] [id number]  Phone: [phone] Fax: [phone]      Controlled Substance Agreement on file:   CSA -- Patient Level:     [Media Unavailable] Controlled Substance Agreement - Opioid - Scan on 3/22/2023  2:37 PM   [Media Unavailable] Controlled Substance Agreement - Opioid - Scan on 3/22/2023  2:37 PM   [Media Unavailable] Controlled Substance Agreement - Opioid - Scan on 3/22/2023  2:37 PM   [Media Unavailable] Controlled Substance Agreement - Opioid - Scan on 3/22/2023  2:37 PM   [Media Unavailable] Controlled Substance Agreement - Opioid - Scan on 3/22/2023  2:37 PM   [Media Unavailable] Controlled Substance Agreement - Opioid - Scan on 11/1/2021  1:44 PM: 11/1/21       Who prescribed the medication?: Yaneli Dozier      Do you need a refill? Yes    When did you use the medication last?  11/26/2023    Patient offered an appointment? No    Do you have any questions or concerns?  No      Could we send this information to you in [facility] or would you prefer to receive a phone call?:   Patient would prefer a phone call   Okay to leave a detailed message?: Yes at Cell number on file:    Telephone Information:   Mobile [phone]

## 2023-11-28 RX ORDER — OXYCODONE HYDROCHLORIDE 5 MG/1
5 TABLET ORAL EVERY 6 HOURS PRN
Qty: 90 TABLET | Refills: 0 | Status: ON HOLD | OUTPATIENT
Start: 2023-11-28 | End: 2024-01-10

## 2023-11-29 ENCOUNTER — NURSE TRIAGE (OUTPATIENT)
Dept: FAMILY MEDICINE | Facility: CLINIC | Age: 78
End: 2023-11-29
Payer: MEDICARE

## 2023-11-29 NOTE — TELEPHONE ENCOUNTER
Patient fell 1.5 week ago at night he went to get out of the chair he fell sideways he hit the chair and then the floor     He had a bump on his hip but his back pain has been aggravated from the fall he now has moderate pain with all movement he is wanting to only see Dr. Dozier if possible states he is sometimes taking 4-5 oxycodone a day right now and that is still not helping his pain     Routing to see if Tasia Hooker can see patient?    Reason for Disposition   MODERATE back pain (e.g., interferes with normal activities) and present > 3 days    Additional Information   Negative: Age > 50 and no history of prior similar back pain   Negative: SEVERE back pain (e.g., excruciating, unable to do any normal activities) and not improved after pain medicine and CARE ADVICE   Negative: Numbness in an arm or hand (i.e., loss of sensation) and upper back pain   Negative: Numbness in a leg or foot (i.e., loss of sensation)   Negative: High-risk adult (e.g., history of cancer, history of HIV, or history of IV Drug Use)   Negative: Soft tissue infection (e.g., abscess, cellulitis) or other serious infection (e.g., bacteremia) in last 2 weeks   Negative: Painful rash with multiple small blisters grouped together (i.e., dermatomal distribution or 'band' or 'stripe')   Negative: Pain radiates into the thigh or further down the leg, and in both legs   Negative: Pain radiates into groin, scrotum   Negative: Blood in urine (red, pink, or tea-colored)   Negative: Vomiting and pain over lower ribs of back (i.e., flank - kidney area)   Negative: Weakness of a leg or foot (e.g., unable to bear weight, dragging foot)   Negative: Patient sounds very sick or weak to the triager   Negative: Fever > 100.4 F (38.0 C) and flank pain   Negative: Pain or burning with passing urine (urination)   Negative: SEVERE back pain of sudden onset and age > 60 years   Negative: SEVERE abdominal pain (e.g., excruciating)   Negative: Abdominal pain and age > 60  years   Negative: Unable to urinate (or only a few drops) and bladder feels very full   Negative: Loss of bladder or bowel control (urine or bowel incontinence; wetting self, leaking stool) of new-onset   Negative: Numbness (loss of sensation) in groin or rectal area   Negative: Passed out (i.e., fainted, collapsed and was not responding)   Negative: Shock suspected (e.g., cold/pale/clammy skin, too weak to stand, low BP, rapid pulse)   Negative: Sounds like a life-threatening emergency to the triager   Negative: Major injury to the back (e.g., MVA, fall > 10 feet or 3 meters, penetrating injury, etc.)   Negative: Pain in the upper back over the ribs (rib cage) that radiates (travels) into the chest   Negative: Pain in the upper back over the ribs (rib cage) and worsened by coughing (or clearly increases with breathing)   Negative: Back pain during pregnancy    Protocols used: Back Pain-A-[place]

## 2023-11-30 ENCOUNTER — APPOINTMENT (OUTPATIENT)
Dept: URBAN - METROPOLITAN AREA CLINIC 255 | Age: 78
Setting detail: DERMATOLOGY
End: 2023-11-30

## 2023-11-30 VITALS — WEIGHT: 170 LBS | HEIGHT: 70 IN

## 2023-11-30 DIAGNOSIS — D22 MELANOCYTIC NEVI: ICD-10-CM

## 2023-11-30 DIAGNOSIS — L81.8 OTHER SPECIFIED DISORDERS OF PIGMENTATION: ICD-10-CM

## 2023-11-30 DIAGNOSIS — L57.8 OTHER SKIN CHANGES DUE TO CHRONIC EXPOSURE TO NONIONIZING RADIATION: ICD-10-CM

## 2023-11-30 DIAGNOSIS — D18.0 HEMANGIOMA: ICD-10-CM

## 2023-11-30 DIAGNOSIS — Z71.89 OTHER SPECIFIED COUNSELING: ICD-10-CM

## 2023-11-30 DIAGNOSIS — L57.0 ACTINIC KERATOSIS: ICD-10-CM

## 2023-11-30 DIAGNOSIS — L82.1 OTHER SEBORRHEIC KERATOSIS: ICD-10-CM

## 2023-11-30 DIAGNOSIS — D69.2 OTHER NONTHROMBOCYTOPENIC PURPURA: ICD-10-CM

## 2023-11-30 DIAGNOSIS — L44.0 PITYRIASIS RUBRA PILARIS: ICD-10-CM

## 2023-11-30 PROBLEM — D18.01 HEMANGIOMA OF SKIN AND SUBCUTANEOUS TISSUE: Status: ACTIVE | Noted: 2023-11-30

## 2023-11-30 PROBLEM — D22.5 MELANOCYTIC NEVI OF TRUNK: Status: ACTIVE | Noted: 2023-11-30

## 2023-11-30 PROCEDURE — OTHER MIPS QUALITY: OTHER

## 2023-11-30 PROCEDURE — OTHER LIQUID NITROGEN: OTHER

## 2023-11-30 PROCEDURE — 99213 OFFICE O/P EST LOW 20 MIN: CPT | Mod: 25

## 2023-11-30 PROCEDURE — OTHER ADDITIONAL NOTES: OTHER

## 2023-11-30 PROCEDURE — 17000 DESTRUCT PREMALG LESION: CPT

## 2023-11-30 PROCEDURE — OTHER COUNSELING: OTHER

## 2023-11-30 ASSESSMENT — LOCATION SIMPLE DESCRIPTION DERM
LOCATION SIMPLE: LEFT FOREARM
LOCATION SIMPLE: RIGHT HAND
LOCATION SIMPLE: RIGHT UPPER BACK
LOCATION SIMPLE: LEFT HAND
LOCATION SIMPLE: RIGHT FOREARM
LOCATION SIMPLE: CHEST
LOCATION SIMPLE: LEFT LOWER BACK
LOCATION SIMPLE: SCALP
LOCATION SIMPLE: RIGHT LOWER BACK
LOCATION SIMPLE: UPPER BACK

## 2023-11-30 ASSESSMENT — LOCATION ZONE DERM
LOCATION ZONE: ARM
LOCATION ZONE: HAND
LOCATION ZONE: TRUNK
LOCATION ZONE: SCALP

## 2023-11-30 ASSESSMENT — LOCATION DETAILED DESCRIPTION DERM
LOCATION DETAILED: LEFT INFERIOR LATERAL MIDBACK
LOCATION DETAILED: RIGHT INFERIOR LATERAL MIDBACK
LOCATION DETAILED: INFERIOR THORACIC SPINE
LOCATION DETAILED: RIGHT MEDIAL INFERIOR CHEST
LOCATION DETAILED: RIGHT THENAR EMINENCE
LOCATION DETAILED: RIGHT MEDIAL UPPER BACK
LOCATION DETAILED: LEFT PROXIMAL DORSAL FOREARM
LOCATION DETAILED: LEFT CENTRAL FRONTAL SCALP
LOCATION DETAILED: RIGHT PROXIMAL DORSAL FOREARM
LOCATION DETAILED: LEFT THENAR EMINENCE

## 2023-11-30 NOTE — PROCEDURE: LIQUID NITROGEN
Render Post-Care Instructions In Note?: yes
Application Tool (Optional): Liquid Nitrogen Sprayer
Consent: - Discussed that these are a result of cumulative sun exposure.\\n- Consent was obtained and risks were reviewed prior to procedure today. All questions were answered prior to procedure today.\\n- Risks discussed include but are not limited to pain, crusting, scabbing, blistering, scarring, temporary or permanent darker or lighter pigmentary change, recurrence, incomplete resolution, and infection.
Show Aperture Variable?: No
Detail Level: Detailed
Duration Of Freeze Thaw-Cycle (Seconds): 0
Post-Care Instructions: - Patient was instructed to avoid picking at any of the treated lesions.

## 2023-11-30 NOTE — HPI: UPPER BODY SKIN CHECK
How Severe Are Your Spot(S)?: mild
What Is The Reason For Today's Visit?: Upper Body Skin Exam
Additional History: Concerning lesions on the face and chest.

## 2023-11-30 NOTE — PROCEDURE: ADDITIONAL NOTES
Additional Notes: - Discussed etiology related to chronic UV exposure and prednisone which he takes for COPD\\n-Recommend barrier protection with daily emollient use and wearing long sleeves
Detail Level: Simple
Render Risk Assessment In Note?: no
Additional Notes: - Was diagnosed years ago, previously treated with emollients and Tazorac cream. Patient reports improvement with treatment but he has not treated for years. States areas involved are sometimes itchy but not significantly so. 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 No plantar involvement.\\n- Recommended utilizing emollients multiple times per day (discussed Vanicream, CeraVe cream, Vaseline)\\n-Recommend keratolytic such as Urea cream\\n- RTC in 4 weeks. Will consider additional of topical steroid as indicated\\n- Given disease is not significantly symptomatic or widespread, do not recommend systemic treatment at this time

## 2023-11-30 NOTE — PROCEDURE: MIPS QUALITY
Quality 130: Documentation Of Current Medications In The Medical Record: Current Medications Documented
Quality 47: Advance Care Plan: Advance Care Planning discussed and documented; advance care plan or surrogate decision maker documented in the medical record.
Quality 110: Preventive Care And Screening: Influenza Immunization: Influenza Immunization Administered during Influenza season
Detail Level: Detailed
Quality 431: Preventive Care And Screening: Unhealthy Alcohol Use - Screening: Patient not identified as an unhealthy alcohol user when screened for unhealthy alcohol use using a systematic screening method
Quality 226: Preventive Care And Screening: Tobacco Use: Screening And Cessation Intervention: Patient screened for tobacco use and is an ex/non-smoker

## 2023-11-30 NOTE — TELEPHONE ENCOUNTER
Yaneli Dozier MD Nazeer, Hira, RN; Cs Triage Im1 hour ago (7:04 AM)     BK  Can do 7 AM tomorrow       Called and spoke with pt. Relayed Dr Ovalle message from above. Assisted with scheduling. Pt agrees to plan.

## 2023-12-01 ENCOUNTER — OFFICE VISIT (OUTPATIENT)
Dept: FAMILY MEDICINE | Facility: CLINIC | Age: 78
End: 2023-12-01
Payer: MEDICARE

## 2023-12-01 ENCOUNTER — ANCILLARY PROCEDURE (OUTPATIENT)
Dept: GENERAL RADIOLOGY | Facility: CLINIC | Age: 78
End: 2023-12-01
Attending: INTERNAL MEDICINE
Payer: MEDICARE

## 2023-12-01 VITALS
OXYGEN SATURATION: 95 % | WEIGHT: 183 LBS | HEART RATE: 56 BPM | TEMPERATURE: 96.9 F | SYSTOLIC BLOOD PRESSURE: 142 MMHG | RESPIRATION RATE: 20 BRPM | HEIGHT: 70 IN | BODY MASS INDEX: 26.2 KG/M2 | DIASTOLIC BLOOD PRESSURE: 76 MMHG

## 2023-12-01 DIAGNOSIS — M05.79 RHEUMATOID ARTHRITIS INVOLVING MULTIPLE SITES WITH POSITIVE RHEUMATOID FACTOR (H): ICD-10-CM

## 2023-12-01 DIAGNOSIS — M54.41 CHRONIC RIGHT-SIDED LOW BACK PAIN WITH RIGHT-SIDED SCIATICA: ICD-10-CM

## 2023-12-01 DIAGNOSIS — I25.10 CORONARY ARTERY DISEASE INVOLVING NATIVE CORONARY ARTERY OF NATIVE HEART WITHOUT ANGINA PECTORIS: ICD-10-CM

## 2023-12-01 DIAGNOSIS — M54.50 ACUTE RIGHT-SIDED LOW BACK PAIN WITHOUT SCIATICA: ICD-10-CM

## 2023-12-01 DIAGNOSIS — M81.0 SENILE OSTEOPOROSIS: ICD-10-CM

## 2023-12-01 DIAGNOSIS — S72.001E: ICD-10-CM
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
## 2023-12-01 DIAGNOSIS — E89.0 POSTSURGICAL HYPOTHYROIDISM: ICD-10-CM

## 2023-12-01 DIAGNOSIS — I10 BENIGN ESSENTIAL HYPERTENSION: ICD-10-CM
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
## 2023-12-01 DIAGNOSIS — G89.29 CHRONIC RIGHT-SIDED LOW BACK PAIN WITH RIGHT-SIDED SCIATICA: ICD-10-CM

## 2023-12-01 LAB
ALBUMIN SERPL BCG-MCNC: 4.4 G/DL (ref 3.5–5.2)
ALP SERPL-CCNC: 135 U/L (ref 40–150)
ALT SERPL W P-5'-P-CCNC: 23 U/L (ref 0–70)
ANION GAP SERPL CALCULATED.3IONS-SCNC: 12 MMOL/L (ref 7–15)
AST SERPL W P-5'-P-CCNC: 23 U/L (ref 0–45)
BILIRUB SERPL-MCNC: 0.5 MG/DL
BUN SERPL-MCNC: 13.9 MG/DL (ref 8–23)
CALCIUM SERPL-MCNC: 9.4 MG/DL (ref 8.8–10.2)
CHLORIDE SERPL-SCNC: 103 MMOL/L (ref 98–107)
CHOLEST SERPL-MCNC: 149 MG/DL
CREAT SERPL-MCNC: 1 MG/DL (ref 0.67–1.17)
CRP SERPL-MCNC: 4.38 MG/L
DEPRECATED HCO3 PLAS-SCNC: 26 MMOL/L (ref 22–29)
EGFRCR SERPLBLD CKD-EPI 2021: 78 ML/MIN/1.73M2
ERYTHROCYTE [DISTWIDTH] IN BLOOD BY AUTOMATED COUNT: 15 % (ref 10–15)
GLUCOSE SERPL-MCNC: 96 MG/DL (ref 70–99)
HCT VFR BLD AUTO: 48 % (ref 40–53)
HDLC SERPL-MCNC: 50 MG/DL
HGB BLD-MCNC: 15 G/DL (ref 13.3–17.7)
LDLC SERPL CALC-MCNC: 65 MG/DL
MCH RBC QN AUTO: 29.6 PG (ref 26.5–33)
MCHC RBC AUTO-ENTMCNC: 31.3 G/DL (ref 31.5–36.5)
MCV RBC AUTO: 95 FL (ref 78–100)
NONHDLC SERPL-MCNC: 99 MG/DL
PLATELET # BLD AUTO: 204 10E3/UL (ref 150–450)
POTASSIUM SERPL-SCNC: 4.5 MMOL/L (ref 3.4–5.3)
PROT SERPL-MCNC: 7.3 G/DL (ref 6.4–8.3)
RBC # BLD AUTO: 5.07 10E6/UL (ref 4.4–5.9)
SODIUM SERPL-SCNC: 141 MMOL/L (ref 135–145)
T4 FREE SERPL-MCNC: 1.49 NG/DL (ref 0.9–1.7)
TRIGL SERPL-MCNC: 172 MG/DL
TSH SERPL DL<=0.005 MIU/L-ACNC: 1.76 UIU/ML (ref 0.3–4.2)
VIT D+METAB SERPL-MCNC: 53 NG/ML (ref 20–50)
WBC # BLD AUTO: 8.7 10E3/UL (ref 4–11)

## 2023-12-01 PROCEDURE — 85027 COMPLETE CBC AUTOMATED: CPT | Performed by: INTERNAL MEDICINE

## 2023-12-01 PROCEDURE — 36415 COLL VENOUS BLD VENIPUNCTURE: CPT | Performed by: INTERNAL MEDICINE

## 2023-12-01 PROCEDURE — 72100 X-RAY EXAM L-S SPINE 2/3 VWS: CPT | Mod: TC | Performed by: STUDENT IN AN ORGANIZED HEALTH CARE EDUCATION/TRAINING PROGRAM

## 2023-12-01 PROCEDURE — 80053 COMPREHEN METABOLIC PANEL: CPT | Performed by: INTERNAL MEDICINE

## 2023-12-01 PROCEDURE — 84439 ASSAY OF FREE THYROXINE: CPT | Performed by: INTERNAL MEDICINE

## 2023-12-01 PROCEDURE — 80061 LIPID PANEL: CPT | Performed by: INTERNAL MEDICINE

## 2023-12-01 PROCEDURE — 82306 VITAMIN D 25 HYDROXY: CPT | Performed by: INTERNAL MEDICINE

## 2023-12-01 PROCEDURE — 84443 ASSAY THYROID STIM HORMONE: CPT | Performed by: INTERNAL MEDICINE

## 2023-12-01 PROCEDURE — 86140 C-REACTIVE PROTEIN: CPT | Performed by: INTERNAL MEDICINE

## 2023-12-01 PROCEDURE — 99214 OFFICE O/P EST MOD 30 MIN: CPT | Mod: 24 | Performed by: INTERNAL MEDICINE

## 2023-12-01 RX ORDER — CYCLOBENZAPRINE HCL 5 MG
5 TABLET ORAL
Qty: 21 TABLET | Refills: 1 | Status: SHIPPED | OUTPATIENT
Start: 2023-12-01 | End: 2024-01-25

## 2023-12-01 RX ORDER — AMLODIPINE BESYLATE 5 MG/1
5 TABLET ORAL AT BEDTIME
Qty: 90 TABLET | Refills: 3 | Status: SHIPPED | OUTPATIENT
Start: 2023-12-01

## 2023-12-01 RX ORDER — NAPROXEN 500 MG/1
500 TABLET ORAL
Qty: 21 TABLET | Refills: 0 | Status: ON HOLD | OUTPATIENT
Start: 2023-12-01 | End: 2024-01-10

## 2023-12-01 RX ORDER — GABAPENTIN 300 MG/1
600 CAPSULE ORAL 2 TIMES DAILY
Qty: 360 CAPSULE | Refills: 3 | Status: ON HOLD | OUTPATIENT
Start: 2023-12-01 | End: 2024-01-10

## 2023-12-01 ASSESSMENT — PAIN SCALES - GENERAL: PAINLEVEL: WORST PAIN (10)

## 2023-12-01 NOTE — PATIENT INSTRUCTIONS
Xray  Lab today    Naprosyn 500 mg Three times daily with food  Flexeril 5 mg bed time  Continue oxycodone upto 4 a day    Gabapentin increase to 600 mg twice daily    Lidocaine patch for twelve hours per day

## 2023-12-01 NOTE — PROGRESS NOTES
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 Falls, initial encounter  W19.XXXA XR Lumbar Spine 2/3 Views     Home Care Referral     cyclobenzaprine (FLEXERIL) 5 MG tablet     naproxen (NAPROSYN) 500 MG tablet     MR Lumbar Spine w/o Contrast     IR Referral      2. Acute right-sided low back pain without sciatica  M54.50 XR Lumbar Spine 2/3 Views     Home Care Referral     cyclobenzaprine (FLEXERIL) 5 MG tablet     naproxen (NAPROSYN) 500 MG tablet     MR Lumbar Spine w/o Contrast     IR Referral      3. Rheumatoid arthritis involving multiple sites with positive rheumatoid factor (H)  M05.79 CRP inflammation     Comprehensive metabolic panel     CBC with Platelets        4. Senile osteoporosis  M81.0 Vitamin D Deficiency      5. Coronary artery disease involving native coronary artery of native heart without angina pectoris  I25.10 Lipid panel reflex to direct LDL Fasting      6. Postsurgical hypothyroidism  E89.0 T4 free     TSH      7. Benign essential hypertension  I10 amLODIPine (NORVASC) 5 MG tablet      8. Chronic right-sided low back pain with right-sided sciatica  M54.41 gabapentin (NEURONTIN) 300 MG capsule    G89.29 cyclobenzaprine (FLEXERIL) 5 MG tablet     naproxen (NAPROSYN) 500 MG tablet     MR Lumbar Spine w/o Contrast      "IR Referral      9. Type I or II open fracture of right hip requiring operative repair with routine healing, subsequent encounter  S72.001E gabapentin (NEURONTIN) 300 MG capsule            Artur Terry is a 78 year old, presenting for the following health issues:  Back Pain (All over )    Patient states that days ago he tripped on his walker and fell when he was trying to go to bed  He could not get up  He has history of fall with hip fracture and hip replacement  His lung condition and heart disease is stable  He does not have any angina  His chronic back pain got worse and he is taking oxycodone  But is not helping  He is not able to sleep  He is  from his wife    History of Present Illness       Back Pain:  He presents for follow up of back pain. Patient's back pain is a chronic problem.  Location of back pain:  Right lower back, left lower back, right middle of back and left middle of back  Description of back pain: sharp  Back pain spreads: right buttocks and left buttocks    Since patient first noticed back pain, pain is: rapidly worsening  Does back pain interfere with his job:  Not applicable       He eats 0-1 servings of fruits and vegetables daily.He consumes 1 sweetened beverage(s) daily.He exercises with enough effort to increase his heart rate 9 or less minutes per day.  He exercises with enough effort to increase his heart rate 3 or less days per week.   He is taking medications regularly.                 Review of Systems   10 point ROS of systems including Constitutional, Eyes, Respiratory, Cardiovascular, Gastroenterology, Genitourinary, Integumentary, Muscularskeletal, Psychiatric were all negative except for pertinent positives noted in my HPI.        Objective    BP (!) 180/78 (BP Location: Right arm, Patient Position: Sitting, Cuff Size: Adult Regular)   Pulse 57   Temp 96.9  F (36.1  C) (Tympanic)   Ht 1.778 m (5' 10\")   Wt 83 kg (183 lb)   SpO2 95%   BMI 26.26 kg/m    Body " mass index is 26.26 kg/m .  Physical Exam   GENERAL: healthy, alert, and severe distress  NECK: no adenopathy, no asymmetry, masses, or scars and thyroid normal to palpation  RESP: lungs clear to auscultation - no rales, rhonchi or wheezes  CV: regular rate and rhythm, normal S1 S2, no S3 or S4, no murmur, click or rub, no peripheral edema and peripheral pulses strong  MS: Very tender over lower back specially on the right L2 and 3 area2                Lab on 09/08/2023   Component Date Value Ref Range Status    TSH 09/08/2023 0.19 (L)  0.30 - 4.20 uIU/mL Final    Free T4 09/08/2023 2.55 (H)  0.90 - 1.70 ng/dL Final    See Scanned Result 09/08/2023 THYROGLOBULIN AND ANTIBODY (SENDOUT TO [facility])-Scanned (A)   Final    Calcium 09/08/2023 9.7  8.8 - 10.2 mg/dL Final     Patient Active Problem List   Diagnosis    Essential hypertension, benign    Pain in joint, upper arm    Allergic rhinitis    Pain in joint, lower leg    Chronic airway obstruction (H)    Monoclonal paraproteinemia    Immunodeficiency (H24)    Eczema    COPD (chronic obstructive pulmonary disease) (H)    Transient cerebral ischemia    Bunion    Occipital neuralgia    Hyperlipidemia LDL goal <100    Health Care Home    Low back pain    Olecranon bursitis of right elbow    Bruit    Retina hole    signed & scanned on 09/23/2011  (1-4-2013 printed but not scanned in)     Chronic, continuous use of opioids    Atherosclerosis of native coronary artery of native heart without angina pectoris    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, unspecified thyrotoxicosis type    Right-sided low back pain with right-sided sciatica    Coronary artery disease involving native coronary artery of native heart without angina pectoris    BRENNEN (obstructive sleep apnea)    Rheumatoid arthritis (H)    Hammer toe of right foot    Hallux limitus of right foot    Corn of toe    Non-recurrent unilateral inguinal hernia with obstruction without gangrene    Left inguinal hernia    Metastasis  from thyroid cancer (H)    Pulmonary nodules    Preoperative examination    Chronic pain disorder    Closed fracture of one rib    Acute on chronic respiratory failure with hypoxia (H)    Closed fracture of one rib of left side, initial encounter    Dehydration    Shock (H)    CALLUM (acute kidney injury) (H24)    COPD, very severe (H)    Acute kidney failure with tubular necrosis (H24)    Hypokalemia    Hypoxia    Spleen hematoma, initial encounter    Fall, initial encounter    SAH (subarachnoid hemorrhage) (H)    Facial laceration, initial encounter    Closed fracture of neck of right femur, initial encounter (H)    Malignant neoplasm metastatic to lung, unspecified laterality (H)     Current Outpatient Medications   Medication    acetaminophen (TYLENOL) 500 MG tablet    albuterol (ACCUNEB) 1.25 MG/3ML neb solution    albuterol (PROAIR HFA/PROVENTIL HFA/VENTOLIN HFA) 108 (90 Base) MCG/ACT inhaler    alendronate (FOSAMAX) 70 MG tablet    amLODIPine (NORVASC) 5 MG tablet    ascorbic acid 1000 MG TABS tablet    aspirin 81 MG EC tablet    azithromycin (ZITHROMAX) 500 MG tablet    calcium carbonate (OS-KARLIE) 1500 (600 Ca) MG tablet    cholecalciferol 25 MCG (1000 UT) TABS    cyclobenzaprine (FLEXERIL) 5 MG tablet    diclofenac (VOLTAREN) 1 % topical gel    DULoxetine (CYMBALTA) 20 MG capsule    Fluticasone-Umeclidin-Vilant (TRELEGY ELLIPTA) 100-62.5-25 MCG/ACT oral inhaler    furosemide (LASIX) 20 MG tablet    gabapentin (NEURONTIN) 300 MG capsule    guaiFENesin (MUCINEX) 600 MG 12 hr tablet    hydrocortisone 2.5 % cream    levothyroxine (SYNTHROID/LEVOTHROID) 125 MCG tablet    Lidocaine (LIDOCARE) 4 % Patch    MegaRed Omega-3 Krill Oil 500 MG CAPS    Melatonin 10 MG TABS tablet    metoprolol succinate ER (TOPROL XL) 50 MG 24 hr tablet    multivitamin w/minerals (THERA-VIT-M) tablet    naloxone (NARCAN) 4 MG/0.1ML nasal spray    naproxen (NAPROSYN) 500 MG tablet    nitroGLYcerin (NITROSTAT) 0.4 MG sublingual tablet     oxyCODONE (ROXICODONE) 5 MG tablet    polyethylene glycol (MIRALAX) 17 g packet    potassium chloride ER (KLOR-CON M) 20 MEQ CR tablet    predniSONE (DELTASONE) 1 MG tablet    predniSONE (DELTASONE) 20 MG tablet    rosuvastatin (CRESTOR) 10 MG tablet    senna-docusate (SENOKOT-S/PERICOLACE) 8.6-50 MG tablet    traZODone (DESYREL) 50 MG tablet     No current facility-administered medications for this visit.

## 2023-12-03 DIAGNOSIS — C73 PAPILLARY THYROID CARCINOMA (H): ICD-10-CM

## 2023-12-03 DIAGNOSIS — E89.0 POSTSURGICAL HYPOTHYROIDISM: ICD-10-CM

## 2023-12-03 RX ORDER — LEVOTHYROXINE SODIUM 125 UG/1
125 TABLET ORAL DAILY
Qty: 90 TABLET | Refills: 3 | Status: SHIPPED | OUTPATIENT
Start: 2023-12-03 | End: 2024-08-20

## 2023-12-05 ENCOUNTER — TELEPHONE (OUTPATIENT)
Dept: FAMILY MEDICINE | Facility: CLINIC | Age: 78
End: 2023-12-05
Payer: MEDICARE

## 2023-12-05 NOTE — TELEPHONE ENCOUNTER
Home Care is calling regarding an established patient with [facility].       Requesting orders from: Yaneli Dozier  Provider is following patient: Yes  Is this a 60-day recertification request?  No    Orders Requested    Skilled Nursing  Request for delay in care, service is not able to be provided within same scheduled day.   Start of care delayed until 12/7/23 due to staffing    Physical Therapy  Request for delay in care, service is not able to be provided within same scheduled day.   Start of care delayed until 12/7/23 due to staffing    Occupational Therapy  Request for delay in care, service is not able to be provided within same scheduled day.   Start of care delayed until 12/7/23 due to staffing    Information was gathered and will be sent to provider for review.  RN will contact Home Care with information after provider review.  Confirmed ok to leave a detailed message with call back.  Contact information confirmed and updated as needed.    Afsaneh Monge RN

## 2023-12-05 NOTE — TELEPHONE ENCOUNTER
I approve of requested home care orders.  He needs help though sooner than later  Can they do IR procedure sooner, please follow up  Yaneli Dozier MD

## 2023-12-06 ENCOUNTER — APPOINTMENT (OUTPATIENT)
Dept: GENERAL RADIOLOGY | Facility: CLINIC | Age: 78
DRG: 190 | End: 2023-12-06
Attending: EMERGENCY MEDICINE
Payer: MEDICARE

## 2023-12-06 ENCOUNTER — HOSPITAL ENCOUNTER (INPATIENT)
Facility: CLINIC | Age: 78
LOS: 35 days | Discharge: NURSING, REHAB OR LONG-TERM CARE | DRG: 190 | End: 2024-01-10
Attending: EMERGENCY MEDICINE | Admitting: STUDENT IN AN ORGANIZED HEALTH CARE EDUCATION/TRAINING PROGRAM
Payer: MEDICARE

## 2023-12-06 DIAGNOSIS — J44.9 COPD, VERY SEVERE (H): ICD-10-CM

## 2023-12-06 DIAGNOSIS — J44.1 CHRONIC OBSTRUCTIVE PULMONARY DISEASE WITH ACUTE EXACERBATION (H): Primary | ICD-10-CM

## 2023-12-06 DIAGNOSIS — R09.02 HYPOXIA: ICD-10-CM

## 2023-12-06 DIAGNOSIS — J44.1 COPD EXACERBATION (H): ICD-10-CM

## 2023-12-06 DIAGNOSIS — E87.6 HYPOKALEMIA: ICD-10-CM

## 2023-12-06 LAB
ALBUMIN SERPL BCG-MCNC: 3.9 G/DL (ref 3.5–5.2)
ALP SERPL-CCNC: 179 U/L (ref 40–150)
ALT SERPL W P-5'-P-CCNC: 24 U/L (ref 0–70)
ANION GAP SERPL CALCULATED.3IONS-SCNC: 15 MMOL/L (ref 7–15)
AST SERPL W P-5'-P-CCNC: 21 U/L (ref 0–45)
ATRIAL RATE - MUSE: 88 BPM
BASOPHILS # BLD AUTO: 0 10E3/UL (ref 0–0.2)
BASOPHILS NFR BLD AUTO: 1 %
BILIRUB SERPL-MCNC: 0.3 MG/DL
BUN SERPL-MCNC: 17.6 MG/DL (ref 8–23)
CALCIUM SERPL-MCNC: 9.5 MG/DL (ref 8.8–10.2)
CHLORIDE SERPL-SCNC: 102 MMOL/L (ref 98–107)
CREAT SERPL-MCNC: 0.87 MG/DL (ref 0.67–1.17)
DEPRECATED HCO3 PLAS-SCNC: 22 MMOL/L (ref 22–29)
DIASTOLIC BLOOD PRESSURE - MUSE: NORMAL MMHG
EGFRCR SERPLBLD CKD-EPI 2021: 89 ML/MIN/1.73M2
EOSINOPHIL # BLD AUTO: 0.3 10E3/UL (ref 0–0.7)
EOSINOPHIL NFR BLD AUTO: 4 %
ERYTHROCYTE [DISTWIDTH] IN BLOOD BY AUTOMATED COUNT: 14.6 % (ref 10–15)
FLUAV RNA SPEC QL NAA+PROBE: NEGATIVE
FLUBV RNA RESP QL NAA+PROBE: NEGATIVE
GLUCOSE SERPL-MCNC: 213 MG/DL (ref 70–99)
HCO3 BLDV-SCNC: 28 MMOL/L (ref 21–28)
HCT VFR BLD AUTO: 47.5 % (ref 40–53)
HGB BLD-MCNC: 15.4 G/DL (ref 13.3–17.7)
HOLD SPECIMEN: NORMAL
IMM GRANULOCYTES # BLD: 0.1 10E3/UL
IMM GRANULOCYTES NFR BLD: 1 %
INTERPRETATION ECG - MUSE: NORMAL
LACTATE BLD-SCNC: 2.4 MMOL/L
LYMPHOCYTES # BLD AUTO: 0.9 10E3/UL (ref 0.8–5.3)
LYMPHOCYTES NFR BLD AUTO: 10 %
MAGNESIUM SERPL-MCNC: 2.3 MG/DL (ref 1.7–2.3)
MCH RBC QN AUTO: 29.9 PG (ref 26.5–33)
MCHC RBC AUTO-ENTMCNC: 32.4 G/DL (ref 31.5–36.5)
MCV RBC AUTO: 92 FL (ref 78–100)
MONOCYTES # BLD AUTO: 0.3 10E3/UL (ref 0–1.3)
MONOCYTES NFR BLD AUTO: 3 %
NEUTROPHILS # BLD AUTO: 7.2 10E3/UL (ref 1.6–8.3)
NEUTROPHILS NFR BLD AUTO: 81 %
NRBC # BLD AUTO: 0 10E3/UL
NRBC BLD AUTO-RTO: 0 /100
NT-PROBNP SERPL-MCNC: 369 PG/ML (ref 0–1800)
P AXIS - MUSE: 34 DEGREES
PCO2 BLDV: 48 MM HG (ref 40–50)
PH BLDV: 7.38 [PH] (ref 7.32–7.43)
PLATELET # BLD AUTO: 212 10E3/UL (ref 150–450)
PO2 BLDV: 37 MM HG (ref 25–47)
POTASSIUM SERPL-SCNC: 4.3 MMOL/L (ref 3.4–5.3)
PR INTERVAL - MUSE: 168 MS
PROCALCITONIN SERPL IA-MCNC: 0.06 NG/ML
PROT SERPL-MCNC: 7.3 G/DL (ref 6.4–8.3)
QRS DURATION - MUSE: 72 MS
QT - MUSE: 362 MS
QTC - MUSE: 438 MS
R AXIS - MUSE: 20 DEGREES
RBC # BLD AUTO: 5.15 10E6/UL (ref 4.4–5.9)
RSV RNA SPEC NAA+PROBE: NEGATIVE
SAO2 % BLDV: 68 % (ref 94–100)
SARS-COV-2 RNA RESP QL NAA+PROBE: NEGATIVE
SODIUM SERPL-SCNC: 139 MMOL/L (ref 135–145)
SYSTOLIC BLOOD PRESSURE - MUSE: NORMAL MMHG
T AXIS - MUSE: 41 DEGREES
TROPONIN T SERPL HS-MCNC: 11 NG/L
VENTRICULAR RATE- MUSE: 88 BPM
WBC # BLD AUTO: 8.7 10E3/UL (ref 4–11)

## 2023-12-06 PROCEDURE — 93005 ELECTROCARDIOGRAM TRACING: CPT

## 2023-12-06 PROCEDURE — 82803 BLOOD GASES ANY COMBINATION: CPT | Performed by: STUDENT IN AN ORGANIZED HEALTH CARE EDUCATION/TRAINING PROGRAM

## 2023-12-06 PROCEDURE — 84484 ASSAY OF TROPONIN QUANT: CPT | Performed by: EMERGENCY MEDICINE

## 2023-12-06 PROCEDURE — 80053 COMPREHEN METABOLIC PANEL: CPT | Performed by: EMERGENCY MEDICINE

## 2023-12-06 PROCEDURE — 36415 COLL VENOUS BLD VENIPUNCTURE: CPT

## 2023-12-06 PROCEDURE — 82803 BLOOD GASES ANY COMBINATION: CPT

## 2023-12-06 PROCEDURE — 36415 COLL VENOUS BLD VENIPUNCTURE: CPT | Performed by: EMERGENCY MEDICINE

## 2023-12-06 PROCEDURE — 99223 1ST HOSP IP/OBS HIGH 75: CPT | Mod: AI | Performed by: STUDENT IN AN ORGANIZED HEALTH CARE EDUCATION/TRAINING PROGRAM

## 2023-12-06 PROCEDURE — 250N000011 HC RX IP 250 OP 636: Mod: JZ | Performed by: EMERGENCY MEDICINE

## 2023-12-06 PROCEDURE — 96361 HYDRATE IV INFUSION ADD-ON: CPT

## 2023-12-06 PROCEDURE — 120N000001 HC R&B MED SURG/OB

## 2023-12-06 PROCEDURE — 87637 SARSCOV2&INF A&B&RSV AMP PRB: CPT | Performed by: EMERGENCY MEDICINE

## 2023-12-06 PROCEDURE — 250N000009 HC RX 250: Performed by: EMERGENCY MEDICINE

## 2023-12-06 PROCEDURE — 87040 BLOOD CULTURE FOR BACTERIA: CPT | Performed by: EMERGENCY MEDICINE

## 2023-12-06 PROCEDURE — 250N000013 HC RX MED GY IP 250 OP 250 PS 637: Performed by: STUDENT IN AN ORGANIZED HEALTH CARE EDUCATION/TRAINING PROGRAM

## 2023-12-06 PROCEDURE — 96365 THER/PROPH/DIAG IV INF INIT: CPT

## 2023-12-06 PROCEDURE — 85025 COMPLETE CBC W/AUTO DIFF WBC: CPT | Performed by: EMERGENCY MEDICINE

## 2023-12-06 PROCEDURE — 83605 ASSAY OF LACTIC ACID: CPT

## 2023-12-06 PROCEDURE — 258N000003 HC RX IP 258 OP 636: Performed by: EMERGENCY MEDICINE

## 2023-12-06 PROCEDURE — 84145 PROCALCITONIN (PCT): CPT | Performed by: EMERGENCY MEDICINE

## 2023-12-06 PROCEDURE — 94640 AIRWAY INHALATION TREATMENT: CPT

## 2023-12-06 PROCEDURE — 94660 CPAP INITIATION&MGMT: CPT

## 2023-12-06 PROCEDURE — 83880 ASSAY OF NATRIURETIC PEPTIDE: CPT | Performed by: EMERGENCY MEDICINE

## 2023-12-06 PROCEDURE — 999N000157 HC STATISTIC RCP TIME EA 10 MIN

## 2023-12-06 PROCEDURE — 99291 CRITICAL CARE FIRST HOUR: CPT | Mod: 25

## 2023-12-06 PROCEDURE — 83735 ASSAY OF MAGNESIUM: CPT | Performed by: EMERGENCY MEDICINE

## 2023-12-06 PROCEDURE — 96375 TX/PRO/DX INJ NEW DRUG ADDON: CPT

## 2023-12-06 PROCEDURE — 71045 X-RAY EXAM CHEST 1 VIEW: CPT

## 2023-12-06 RX ORDER — LEVOTHYROXINE SODIUM 125 UG/1
125 TABLET ORAL DAILY
Status: DISCONTINUED | OUTPATIENT
Start: 2023-12-07 | End: 2024-01-10 | Stop reason: HOSPADM

## 2023-12-06 RX ORDER — NALOXONE HYDROCHLORIDE 0.4 MG/ML
0.2 INJECTION, SOLUTION INTRAMUSCULAR; INTRAVENOUS; SUBCUTANEOUS
Status: DISCONTINUED | OUTPATIENT
Start: 2023-12-06 | End: 2024-01-10 | Stop reason: HOSPADM

## 2023-12-06 RX ORDER — AZITHROMYCIN 250 MG/1
250 TABLET, FILM COATED ORAL DAILY
Status: COMPLETED | OUTPATIENT
Start: 2023-12-07 | End: 2023-12-10

## 2023-12-06 RX ORDER — CALCIUM CARBONATE 500 MG/1
1000 TABLET, CHEWABLE ORAL 4 TIMES DAILY PRN
Status: DISCONTINUED | OUTPATIENT
Start: 2023-12-06 | End: 2024-01-10 | Stop reason: HOSPADM

## 2023-12-06 RX ORDER — AMLODIPINE BESYLATE 5 MG/1
5 TABLET ORAL AT BEDTIME
Status: DISCONTINUED | OUTPATIENT
Start: 2023-12-06 | End: 2024-01-10 | Stop reason: HOSPADM

## 2023-12-06 RX ORDER — GABAPENTIN 300 MG/1
600 CAPSULE ORAL 2 TIMES DAILY
Status: DISCONTINUED | OUTPATIENT
Start: 2023-12-06 | End: 2023-12-12

## 2023-12-06 RX ORDER — OXYCODONE HYDROCHLORIDE 5 MG/1
5 TABLET ORAL EVERY 6 HOURS PRN
Status: DISCONTINUED | OUTPATIENT
Start: 2023-12-06 | End: 2023-12-09

## 2023-12-06 RX ORDER — LIDOCAINE 40 MG/G
CREAM TOPICAL
Status: DISCONTINUED | OUTPATIENT
Start: 2023-12-06 | End: 2024-01-10 | Stop reason: HOSPADM

## 2023-12-06 RX ORDER — CYCLOBENZAPRINE HCL 5 MG
5 TABLET ORAL
Status: DISCONTINUED | OUTPATIENT
Start: 2023-12-06 | End: 2023-12-11

## 2023-12-06 RX ORDER — ALBUTEROL SULFATE 0.83 MG/ML
2.5 SOLUTION RESPIRATORY (INHALATION)
Status: DISCONTINUED | OUTPATIENT
Start: 2023-12-06 | End: 2023-12-09

## 2023-12-06 RX ORDER — AMOXICILLIN 250 MG
1 CAPSULE ORAL 2 TIMES DAILY PRN
Status: DISCONTINUED | OUTPATIENT
Start: 2023-12-06 | End: 2023-12-18

## 2023-12-06 RX ORDER — FUROSEMIDE 20 MG
20 TABLET ORAL DAILY
Status: DISCONTINUED | OUTPATIENT
Start: 2023-12-07 | End: 2023-12-24

## 2023-12-06 RX ORDER — ACETAMINOPHEN 500 MG
1000 TABLET ORAL EVERY 8 HOURS PRN
Status: DISCONTINUED | OUTPATIENT
Start: 2023-12-06 | End: 2023-12-12

## 2023-12-06 RX ORDER — METHYLPREDNISOLONE SODIUM SUCCINATE 125 MG/2ML
125 INJECTION, POWDER, LYOPHILIZED, FOR SOLUTION INTRAMUSCULAR; INTRAVENOUS ONCE
Status: COMPLETED | OUTPATIENT
Start: 2023-12-06 | End: 2023-12-06

## 2023-12-06 RX ORDER — IPRATROPIUM BROMIDE AND ALBUTEROL SULFATE 2.5; .5 MG/3ML; MG/3ML
3 SOLUTION RESPIRATORY (INHALATION) EVERY 4 HOURS
Status: DISCONTINUED | OUTPATIENT
Start: 2023-12-06 | End: 2023-12-08

## 2023-12-06 RX ORDER — IPRATROPIUM BROMIDE AND ALBUTEROL SULFATE 2.5; .5 MG/3ML; MG/3ML
3 SOLUTION RESPIRATORY (INHALATION) ONCE
Status: COMPLETED | OUTPATIENT
Start: 2023-12-06 | End: 2023-12-06

## 2023-12-06 RX ORDER — AZITHROMYCIN 250 MG/1
500 TABLET, FILM COATED ORAL ONCE
Status: COMPLETED | OUTPATIENT
Start: 2023-12-06 | End: 2023-12-06

## 2023-12-06 RX ORDER — GUAIFENESIN 600 MG/1
600 TABLET, EXTENDED RELEASE ORAL 2 TIMES DAILY PRN
Status: DISCONTINUED | OUTPATIENT
Start: 2023-12-06 | End: 2024-01-10 | Stop reason: HOSPADM

## 2023-12-06 RX ORDER — ROSUVASTATIN CALCIUM 10 MG/1
10 TABLET, COATED ORAL EVERY EVENING
Status: DISCONTINUED | OUTPATIENT
Start: 2023-12-06 | End: 2024-01-10 | Stop reason: HOSPADM

## 2023-12-06 RX ORDER — NAPROXEN 500 MG/1
500 TABLET ORAL
Status: DISCONTINUED | OUTPATIENT
Start: 2023-12-07 | End: 2023-12-08

## 2023-12-06 RX ORDER — TRAZODONE HYDROCHLORIDE 50 MG/1
50 TABLET, FILM COATED ORAL AT BEDTIME
Status: DISCONTINUED | OUTPATIENT
Start: 2023-12-06 | End: 2024-01-10 | Stop reason: HOSPADM

## 2023-12-06 RX ORDER — NALOXONE HYDROCHLORIDE 0.4 MG/ML
0.4 INJECTION, SOLUTION INTRAMUSCULAR; INTRAVENOUS; SUBCUTANEOUS
Status: DISCONTINUED | OUTPATIENT
Start: 2023-12-06 | End: 2024-01-10 | Stop reason: HOSPADM

## 2023-12-06 RX ORDER — MAGNESIUM SULFATE HEPTAHYDRATE 40 MG/ML
2 INJECTION, SOLUTION INTRAVENOUS ONCE
Status: COMPLETED | OUTPATIENT
Start: 2023-12-06 | End: 2023-12-06

## 2023-12-06 RX ORDER — METHYLPREDNISOLONE SODIUM SUCCINATE 40 MG/ML
40 INJECTION, POWDER, LYOPHILIZED, FOR SOLUTION INTRAMUSCULAR; INTRAVENOUS DAILY
Status: DISCONTINUED | OUTPATIENT
Start: 2023-12-07 | End: 2023-12-07

## 2023-12-06 RX ORDER — METOPROLOL SUCCINATE 50 MG/1
50 TABLET, EXTENDED RELEASE ORAL EVERY EVENING
Status: DISCONTINUED | OUTPATIENT
Start: 2023-12-07 | End: 2024-01-10 | Stop reason: HOSPADM

## 2023-12-06 RX ORDER — ASPIRIN 81 MG/1
81 TABLET ORAL 2 TIMES DAILY
Status: DISCONTINUED | OUTPATIENT
Start: 2023-12-06 | End: 2024-01-10 | Stop reason: HOSPADM

## 2023-12-06 RX ORDER — IPRATROPIUM BROMIDE AND ALBUTEROL SULFATE 2.5; .5 MG/3ML; MG/3ML
3 SOLUTION RESPIRATORY (INHALATION)
Status: DISCONTINUED | OUTPATIENT
Start: 2023-12-06 | End: 2023-12-06

## 2023-12-06 RX ORDER — AMOXICILLIN 250 MG
2 CAPSULE ORAL 2 TIMES DAILY PRN
Status: DISCONTINUED | OUTPATIENT
Start: 2023-12-06 | End: 2023-12-18

## 2023-12-06 RX ORDER — DULOXETIN HYDROCHLORIDE 20 MG/1
20 CAPSULE, DELAYED RELEASE ORAL 2 TIMES DAILY
Status: DISCONTINUED | OUTPATIENT
Start: 2023-12-06 | End: 2024-01-10 | Stop reason: HOSPADM

## 2023-12-06 RX ADMIN — AMLODIPINE BESYLATE 5 MG: 5 TABLET ORAL at 22:45

## 2023-12-06 RX ADMIN — METHYLPREDNISOLONE SODIUM SUCCINATE 125 MG: 125 INJECTION, POWDER, FOR SOLUTION INTRAMUSCULAR; INTRAVENOUS at 18:08

## 2023-12-06 RX ADMIN — MAGNESIUM SULFATE HEPTAHYDRATE 2 G: 40 INJECTION, SOLUTION INTRAVENOUS at 18:10

## 2023-12-06 RX ADMIN — IPRATROPIUM BROMIDE AND ALBUTEROL SULFATE 3 ML: .5; 3 SOLUTION RESPIRATORY (INHALATION) at 18:09

## 2023-12-06 RX ADMIN — SODIUM CHLORIDE 1000 ML: 9 INJECTION, SOLUTION INTRAVENOUS at 18:12

## 2023-12-06 RX ADMIN — ASPIRIN 81 MG: 81 TABLET, COATED ORAL at 22:45

## 2023-12-06 RX ADMIN — TRAZODONE HYDROCHLORIDE 50 MG: 50 TABLET ORAL at 22:45

## 2023-12-06 RX ADMIN — AZITHROMYCIN DIHYDRATE 500 MG: 250 TABLET ORAL at 22:44

## 2023-12-06 RX ADMIN — GABAPENTIN 600 MG: 300 CAPSULE ORAL at 22:45

## 2023-12-06 RX ADMIN — ROSUVASTATIN CALCIUM 10 MG: 10 TABLET, FILM COATED ORAL at 22:45

## 2023-12-06 ASSESSMENT — ACTIVITIES OF DAILY LIVING (ADL)
ADLS_ACUITY_SCORE: 35

## 2023-12-06 NOTE — ED TRIAGE NOTES
Pt BIBA from home. Hx COPD, Chronic PNA. Increased WOB, wheezing tachypnic.  20G L arm, 3 nebs given.

## 2023-12-06 NOTE — ED PROVIDER NOTES
History     Chief Complaint:  Shortness of Breath       HPI   Harrison Thomas is a 78 year old male with history of hypertension, hyperlipidemia, emphysema, asthma, COPD, and lung cancer who presents with shortness of breath. The patient reports that he has been short of breath over the last week which has worsened significantly today. He endorses mild cough and chest tightness but no chest pain. He has received 3 neb treatments by EMS prior to arrival that he says helped his symptoms. The patient also did 3 nebulizer's at home. He also notes being started on prednisone 60 mg yesterday, with his last dose today. The patient uses oxygen at home as needed, so he has been using it much more recently. Of note, the patient has cardiac history of stent placement and angioplasty a long time ago.      Independent Historian:   None - Patient Only    Review of External Notes:   Patient previously admitted for COPD exacerbation in January of this year on chart review.      Medications:    Accuneb  Proair  Fosamax  Norvasc  Aspirin 81 mg  Zithromax  Flexeril  Cymbalta  Lasix  Neurontin  Toprol  Levothyroxine  Nitrostat  Deltasone  Desyrel    Past Medical History:    Allergic rhinitis  Arthritis  CAD  Cancer  Cerebral infarction  COPD  HTN  HLD  Monoclonal paraproteinemia  BRENNEN  Chronic pain  Retina hole  Syncope  TIA  Asthma  Emphysema  Lung cancer  Thyroid nodule    Past Surgical History:    Hip arthroplasty (R)  Bronchoscopy  Stent placement  Colonoscopy x2  Cataract removal (B)  Combined EGD  Torn retina eye surgery  Heart cath, angioplasty  Herniorrhaphy inguinal (L)  Laser holmium enucleation prostate  Mediastinoscopy  Right meniscus repair  Thoracoscopy  Thyroidectomy    Physical Exam   Patient Vitals for the past 24 hrs:   BP Temp Temp src Pulse Resp SpO2 Height Weight   12/06/23 1830 (!) 160/77 -- -- 84 18 98 % -- --   12/06/23 1810 (!) 168/86 -- -- 97 -- -- -- --   12/06/23 1809 -- -- -- 96 30 98 % -- --   12/06/23  "1753 (!) 154/72 -- -- -- -- -- -- --   12/06/23 1752 -- 98.1  F (36.7  C) Temporal -- -- -- 1.778 m (5' 10\") 79.4 kg (175 lb)   12/06/23 1751 -- -- -- -- (!) 40 -- -- --   12/06/23 1750 -- -- -- 95 -- 98 % -- --        Physical Exam  Vitals reviewed.   Constitutional:       General: He is in acute distress.      Appearance: He is not ill-appearing.   HENT:      Head: Normocephalic and atraumatic.   Eyes:      Extraocular Movements: Extraocular movements intact.   Cardiovascular:      Rate and Rhythm: Normal rate and regular rhythm.   Pulmonary:      Effort: Respiratory distress present.      Breath sounds: Decreased breath sounds present. No wheezing.      Comments: Patient tachypneic, diminished breath sounds throughout poor air movement  Abdominal:      Palpations: Abdomen is soft.      Tenderness: There is no abdominal tenderness. There is no guarding.   Musculoskeletal:      Cervical back: Normal range of motion.   Skin:     General: Skin is warm and dry.   Neurological:      Mental Status: He is alert and oriented to person, place, and time.      GCS: GCS eye subscore is 4. GCS verbal subscore is 5. GCS motor subscore is 6.   Psychiatric:         Behavior: Behavior normal.           Emergency Department Course   ECG  ECG taken at 1756, ECG read at 1806  Normal sinus rhythm  Possible inferior ifnarct, age undetermined  Abnormal ECG   No change as compared to prior, dated 6/12/23.  Rate 88 bpm. GA interval 168 ms. QRS duration 72 ms. QT/QTc 362/438 ms. P-R-T axes 34 20 41.     Imaging:  XR Chest Port 1 View   Final Result   IMPRESSION: Faint residual opacities in the right upper lobe corresponding to right upper lobe mass and posttreatment changes. No other focal opacities. No pleural effusion or pneumothorax. Normal cardiomediastinal silhouette.             Laboratory:  Labs Ordered and Resulted from Time of ED Arrival to Time of ED Departure   COMPREHENSIVE METABOLIC PANEL - Abnormal       Result Value    " Sodium 139      Potassium 4.3      Carbon Dioxide (CO2) 22      Anion Gap 15      Urea Nitrogen 17.6      Creatinine 0.87      GFR Estimate 89      Calcium 9.5      Chloride 102      Glucose 213 (*)     Alkaline Phosphatase 179 (*)     AST 21      ALT 24      Protein Total 7.3      Albumin 3.9      Bilirubin Total 0.3     ISTAT GASES LACTATE VENOUS POCT - Abnormal    Lactic Acid POCT 2.4 (*)     Bicarbonate Venous POCT 28      O2 Sat, Venous POCT 68 (*)     pCO2 Venous POCT 48      pH Venous POCT 7.38      pO2 Venous POCT 37     PROCALCITONIN - Normal    Procalcitonin 0.06     NT PROBNP INPATIENT - Normal    N terminal Pro BNP Inpatient 369     TROPONIN T, HIGH SENSITIVITY - Normal    Troponin T, High Sensitivity 11     MAGNESIUM - Normal    Magnesium 2.3     CBC WITH PLATELETS AND DIFFERENTIAL    WBC Count 8.7      RBC Count 5.15      Hemoglobin 15.4      Hematocrit 47.5      MCV 92      MCH 29.9      MCHC 32.4      RDW 14.6      Platelet Count 212      % Neutrophils 81      % Lymphocytes 10      % Monocytes 3      % Eosinophils 4      % Basophils 1      % Immature Granulocytes 1      NRBCs per 100 WBC 0      Absolute Neutrophils 7.2      Absolute Lymphocytes 0.9      Absolute Monocytes 0.3      Absolute Eosinophils 0.3      Absolute Basophils 0.0      Absolute Immature Granulocytes 0.1      Absolute NRBCs 0.0     INFLUENZA A/B, RSV, & SARS-COV2 PCR   BLOOD CULTURE   BLOOD CULTURE          Emergency Department Course & Assessments:        Interventions:  Medications   sodium chloride 0.9% BOLUS 1,000 mL (1,000 mLs Intravenous $New Bag 12/6/23 1812)   methylPREDNISolone sodium succinate (solu-MEDROL) injection 125 mg (125 mg Intravenous $Given 12/6/23 1808)   magnesium sulfate 2 g in 50 mL sterile water intermittent infusion (0 g Intravenous Stopped 12/6/23 1830)   ipratropium - albuterol 0.5 mg/2.5 mg/3 mL (DUONEB) neb solution 3 mL (3 mLs Nebulization $Given 12/6/23 1809)        Assessments:  1755 I obtained  history and examined the patient as noted above   I rechecked the patient and explained findings    Independent Interpretation (X-rays, CTs, rhythm strip):  Chest x-ray reviewed no acute infiltrates.  Consultations/Discussion of Management or Tests:   I spoke with Dr. Viramontes, hospitalist, who accepts the patient     ED Course as of 12/06/23 1917   Wed Dec 06, 2023   1811 Lactic Acid POCT(!): 2.4   181 Bicarbonate Venous POCT: 28    O2 Sat, Venous POCT(!): 68    Reevaluated on the BiPAP at this time.    Patient states that he feels like he is improving does not feel comfortable on the BiPAP.  Blood gas does not show any hypercapnia O2 slightly low at 68.  Will trial patient off shortly.    Currently off BiPAP doing well lungs clear saturation 97% while on 4 L OxyMask.       Social Determinants of Health affecting care:   None    Disposition:  The patient was admitted to the hospital under the care of Dr. Viramontes.     Impression & Plan      Medical Decision Makin-year-old male history of COPD, lung cancer presenting today with increased shortness of breath.  States that he typically uses O2 as needed however has been requiring 24/7 oxygen.  He has been using breathing treatments at home without significant improvement.  He has taken prednisone 60 mg today.  He presented via EMS was given breathing treatments however on assessment after DuoNeb's he continued with diminished breath sounds and tachypnea.  He was given another dose of DuoNeb and placed on BiPAP.  Blood gas shows no hypercapnia pH is normal.  On reassessment of the patient after DuoNeb, Solu-Medrol, magnesium he had improvement of his respirations.  Patient has to be taken off BiPAP.  He was trialed off BiPAP and was able to tolerate OxyMask at 4 L without any problem.  I discussed with him plan for admission for COPD exacerbation.  Case was discussed with Dr. Viramontes      Diagnosis:    ICD-10-CM    1. COPD exacerbation (H)   J44.1       2. [facility]  R09.02              Scribe Disclosure:  I, Rick Dykes, am serving as a scribe at 5:54 PM on 12/6/2023 to document services personally performed by Jo Ann Pineda DO based on my observations and the provider's statements to me.   12/6/2023   Jo Ann Pineda DO Doan, Tiffani, DO  12/06/23 1917

## 2023-12-06 NOTE — ED NOTES
"PIT/Triage Evaluation    Patient presented with shortness of breath. He explains that he has Lung cancer, COPD and emphysema. He is currently using oxygen but is usually only used for rescue. He got 3 breathing treatments en route and they seemed to make a difference. The breathing has got worse the last couple of days. Has a little chest pain and can be described as a tightness. He recently started an antibiotic for the sickness. Denies any new swelling and no travel recently. Denies any fevers, coughs, and chills.     Exam is notable for:    Patient Vitals for the past 24 hrs:   BP Temp Temp src Pulse Resp SpO2 Height Weight   12/06/23 1753 (!) 154/72 -- -- -- -- -- -- --   12/06/23 1752 -- 98.1  F (36.7  C) Temporal -- -- -- 1.778 m (5' 10\") 79.4 kg (175 lb)   12/06/23 1751 -- -- -- -- (!) 40 -- -- --   12/06/23 1750 -- -- -- 95 -- 98 % -- --     Constitutional: Alert, attentive, GCS 15  HENT:    Nose: Nose normal.    Mouth/Throat: Oropharynx is clear, mucous membranes are moist  Eyes: EOM are normal, anicteric, conjugate gaze  CV: regular rate and rhythm; no murmurs  Chest: Diffuse expiratory wheezing. Tachyphemic and shallowed respiratory   GI:  non tender. No distension. No guarding or rebound.    MSK: No LE edema, no tenderness to palpation of BLE.  Neurological: Alert, attentive, moving all extremities equally.   Skin: Skin is warm and dry.    Appropriate interventions for symptom management were initiated if applicable.  Appropriate diagnostic tests were initiated if indicated.    Important information for subsequent clinician:  78-year-old with a complicated past medical history including metastatic thyroid cancer to his lungs, intermittently O2 dependent COPD with emphysema currently on a steroid burst and antibiotics presenting for gradually worsening shortness of breath since this morning.  He was given DuoNebs by EMS initially brought up to triage.  Noted to be significantly tachypneic, markedly " decreased air movement and was expedited back to a stable room.    I briefly evaluated the patient and developed an initial plan of care. I discussed this plan and explained that this brief interaction does not constitute a full evaluation. Patient/family understands that they should wait to be fully evaluated and discuss any test results with another clinician prior to leaving the hospital.    Jorge Rodriguez MD  [employer]  5:57 PM 12/06/23     Scribe Disclosure:  I, Juan José Vegas, am serving as a scribe at 5:48 PM on 12/6/2023 to document services personally performed by Jorge Rodriguez MD  based on my observations and the provider's statements to me.       Jorge Rodriguez MD  12/06/23 0003

## 2023-12-06 NOTE — ED TRIAGE NOTES
SOB over last week, worse today. Patient has lung cancer, COPD and emphysema. Patient received 3 neb treatments by EMS pta.

## 2023-12-07 LAB
BASE EXCESS BLDV CALC-SCNC: 0.5 MMOL/L (ref -7.7–1.9)
GLUCOSE BLDC GLUCOMTR-MCNC: 114 MG/DL (ref 70–99)
GLUCOSE BLDC GLUCOMTR-MCNC: 186 MG/DL (ref 70–99)
HBA1C MFR BLD: 5.4 %
HCO3 BLDV-SCNC: 26 MMOL/L (ref 21–28)
LACTATE SERPL-SCNC: 1.8 MMOL/L (ref 0.7–2)
LACTATE SERPL-SCNC: 1.8 MMOL/L (ref 0.7–2)
LACTATE SERPL-SCNC: 3.9 MMOL/L (ref 0.7–2)
O2/TOTAL GAS SETTING VFR VENT: 0 %
PCO2 BLDV: 42 MM HG (ref 40–50)
PH BLDV: 7.39 [PH] (ref 7.32–7.43)
PO2 BLDV: 50 MM HG (ref 25–47)

## 2023-12-07 PROCEDURE — 250N000013 HC RX MED GY IP 250 OP 250 PS 637: Performed by: STUDENT IN AN ORGANIZED HEALTH CARE EDUCATION/TRAINING PROGRAM

## 2023-12-07 PROCEDURE — 999N000157 HC STATISTIC RCP TIME EA 10 MIN

## 2023-12-07 PROCEDURE — 250N000009 HC RX 250: Performed by: STUDENT IN AN ORGANIZED HEALTH CARE EDUCATION/TRAINING PROGRAM

## 2023-12-07 PROCEDURE — 94640 AIRWAY INHALATION TREATMENT: CPT | Mod: 76

## 2023-12-07 PROCEDURE — 99233 SBSQ HOSP IP/OBS HIGH 50: CPT | Performed by: HOSPITALIST

## 2023-12-07 PROCEDURE — 83036 HEMOGLOBIN GLYCOSYLATED A1C: CPT | Performed by: HOSPITALIST

## 2023-12-07 PROCEDURE — 83605 ASSAY OF LACTIC ACID: CPT | Performed by: INTERNAL MEDICINE

## 2023-12-07 PROCEDURE — 120N000001 HC R&B MED SURG/OB

## 2023-12-07 PROCEDURE — 36415 COLL VENOUS BLD VENIPUNCTURE: CPT | Performed by: INTERNAL MEDICINE

## 2023-12-07 PROCEDURE — 94640 AIRWAY INHALATION TREATMENT: CPT

## 2023-12-07 PROCEDURE — 83605 ASSAY OF LACTIC ACID: CPT | Performed by: HOSPITALIST

## 2023-12-07 PROCEDURE — 250N000011 HC RX IP 250 OP 636: Mod: JZ | Performed by: HOSPITALIST

## 2023-12-07 PROCEDURE — 36415 COLL VENOUS BLD VENIPUNCTURE: CPT | Performed by: HOSPITALIST

## 2023-12-07 PROCEDURE — 258N000003 HC RX IP 258 OP 636: Performed by: INTERNAL MEDICINE

## 2023-12-07 PROCEDURE — 250N000011 HC RX IP 250 OP 636: Performed by: STUDENT IN AN ORGANIZED HEALTH CARE EDUCATION/TRAINING PROGRAM

## 2023-12-07 RX ORDER — DEXTROSE MONOHYDRATE 25 G/50ML
25-50 INJECTION, SOLUTION INTRAVENOUS
Status: DISCONTINUED | OUTPATIENT
Start: 2023-12-07 | End: 2024-01-10 | Stop reason: HOSPADM

## 2023-12-07 RX ORDER — METHYLPREDNISOLONE SODIUM SUCCINATE 125 MG/2ML
60 INJECTION, POWDER, LYOPHILIZED, FOR SOLUTION INTRAMUSCULAR; INTRAVENOUS EVERY 8 HOURS
Status: DISCONTINUED | OUTPATIENT
Start: 2023-12-07 | End: 2023-12-11

## 2023-12-07 RX ORDER — FLUTICASONE FUROATE AND VILANTEROL 100; 25 UG/1; UG/1
1 POWDER RESPIRATORY (INHALATION) DAILY
Status: DISCONTINUED | OUTPATIENT
Start: 2023-12-07 | End: 2023-12-07

## 2023-12-07 RX ORDER — NICOTINE POLACRILEX 4 MG
15-30 LOZENGE BUCCAL
Status: DISCONTINUED | OUTPATIENT
Start: 2023-12-07 | End: 2024-01-10 | Stop reason: HOSPADM

## 2023-12-07 RX ADMIN — NAPROXEN 500 MG: 500 TABLET ORAL at 08:48

## 2023-12-07 RX ADMIN — LEVOTHYROXINE SODIUM 125 MCG: 125 TABLET ORAL at 08:48

## 2023-12-07 RX ADMIN — NAPROXEN 500 MG: 500 TABLET ORAL at 13:59

## 2023-12-07 RX ADMIN — IPRATROPIUM BROMIDE AND ALBUTEROL SULFATE 3 ML: .5; 3 SOLUTION RESPIRATORY (INHALATION) at 03:09

## 2023-12-07 RX ADMIN — DULOXETINE HYDROCHLORIDE 20 MG: 20 CAPSULE, DELAYED RELEASE ORAL at 08:48

## 2023-12-07 RX ADMIN — AMLODIPINE BESYLATE 5 MG: 5 TABLET ORAL at 22:05

## 2023-12-07 RX ADMIN — DOCUSATE SODIUM 50 MG AND SENNOSIDES 8.6 MG 1 TABLET: 8.6; 5 TABLET, FILM COATED ORAL at 12:49

## 2023-12-07 RX ADMIN — GABAPENTIN 600 MG: 300 CAPSULE ORAL at 08:48

## 2023-12-07 RX ADMIN — OXYCODONE HYDROCHLORIDE 5 MG: 5 TABLET ORAL at 00:46

## 2023-12-07 RX ADMIN — ASPIRIN 81 MG: 81 TABLET, COATED ORAL at 08:48

## 2023-12-07 RX ADMIN — NAPROXEN 500 MG: 500 TABLET ORAL at 17:37

## 2023-12-07 RX ADMIN — METHYLPREDNISOLONE SODIUM SUCCINATE 62.5 MG: 125 INJECTION, POWDER, FOR SOLUTION INTRAMUSCULAR; INTRAVENOUS at 17:38

## 2023-12-07 RX ADMIN — FUROSEMIDE 20 MG: 20 TABLET ORAL at 08:48

## 2023-12-07 RX ADMIN — OXYCODONE HYDROCHLORIDE 5 MG: 5 TABLET ORAL at 10:41

## 2023-12-07 RX ADMIN — IPRATROPIUM BROMIDE AND ALBUTEROL SULFATE 3 ML: .5; 3 SOLUTION RESPIRATORY (INHALATION) at 10:28

## 2023-12-07 RX ADMIN — ROSUVASTATIN CALCIUM 10 MG: 10 TABLET, FILM COATED ORAL at 20:23

## 2023-12-07 RX ADMIN — ASPIRIN 81 MG: 81 TABLET, COATED ORAL at 20:23

## 2023-12-07 RX ADMIN — IPRATROPIUM BROMIDE AND ALBUTEROL SULFATE 3 ML: .5; 3 SOLUTION RESPIRATORY (INHALATION) at 19:15

## 2023-12-07 RX ADMIN — TRAZODONE HYDROCHLORIDE 50 MG: 50 TABLET ORAL at 22:04

## 2023-12-07 RX ADMIN — DULOXETINE HYDROCHLORIDE 20 MG: 20 CAPSULE, DELAYED RELEASE ORAL at 20:23

## 2023-12-07 RX ADMIN — OXYCODONE HYDROCHLORIDE 5 MG: 5 TABLET ORAL at 22:04

## 2023-12-07 RX ADMIN — IPRATROPIUM BROMIDE AND ALBUTEROL SULFATE 3 ML: .5; 3 SOLUTION RESPIRATORY (INHALATION) at 06:56

## 2023-12-07 RX ADMIN — SODIUM CHLORIDE 500 ML: 9 INJECTION, SOLUTION INTRAVENOUS at 01:47

## 2023-12-07 RX ADMIN — IPRATROPIUM BROMIDE AND ALBUTEROL SULFATE 3 ML: .5; 3 SOLUTION RESPIRATORY (INHALATION) at 00:12

## 2023-12-07 RX ADMIN — IPRATROPIUM BROMIDE AND ALBUTEROL SULFATE 3 ML: .5; 3 SOLUTION RESPIRATORY (INHALATION) at 14:16

## 2023-12-07 RX ADMIN — AZITHROMYCIN DIHYDRATE 250 MG: 250 TABLET ORAL at 08:48

## 2023-12-07 RX ADMIN — DULOXETINE HYDROCHLORIDE 20 MG: 20 CAPSULE, DELAYED RELEASE ORAL at 00:42

## 2023-12-07 RX ADMIN — METHYLPREDNISOLONE SODIUM SUCCINATE 40 MG: 40 INJECTION, POWDER, FOR SOLUTION INTRAMUSCULAR; INTRAVENOUS at 08:47

## 2023-12-07 RX ADMIN — GABAPENTIN 600 MG: 300 CAPSULE ORAL at 20:23

## 2023-12-07 RX ADMIN — METOPROLOL SUCCINATE 50 MG: 50 TABLET, EXTENDED RELEASE ORAL at 20:23

## 2023-12-07 ASSESSMENT — ACTIVITIES OF DAILY LIVING (ADL)
ADLS_ACUITY_SCORE: 26
ADLS_ACUITY_SCORE: 30
ADLS_ACUITY_SCORE: 26
ADLS_ACUITY_SCORE: 30
ADLS_ACUITY_SCORE: 30
ADLS_ACUITY_SCORE: 26
ADLS_ACUITY_SCORE: 30
ADLS_ACUITY_SCORE: 26
ADLS_ACUITY_SCORE: 30

## 2023-12-07 NOTE — PLAN OF CARE
Summary: SOB d/t COPD exacerbation. He uses O2 at home as needed, but has using it much more recently.    DATE & TIME: 12/7/23 @ [id number]    Cognitive Concerns/ Orientation : A&Ox4   BEHAVIOR & AGGRESSION TOOL COLOR: Green  CIWA SCORE: n/a   ABNL VS/O2: On 5L of NC, on neb Q4h, HTN  MOBILITY: Assist of 1- refused using   PAIN MANAGMENT: Chronic low back pain. Oxy given x1  DIET: Regular   BOWEL/BLADDER: Continent  ABNL LAB/BG: Lactic acid 3.9, recheck this morning  DRAIN/DEVICES: 2 PIV IV SL  TELEMETRY RHYTHM: n/a  SKIN: Scattered bruising, scab, no wounds   TESTS/PROCEDURES: pending   D/C DAY/GOALS/PLACE: Pending for clinical improvement   OTHER IMPORTANT INFO: Pt home meds sent to the pharmacy. IP consulted

## 2023-12-07 NOTE — ED NOTES
[facility]  ED Nurse Handoff Report    ED Chief complaint: Shortness of Breath      ED Diagnosis:   Final diagnoses:   None       Code Status: to be addressed    Allergies:   Allergies   Allergen Reactions    Levaquin Difficulty breathing    Atorvastatin Calcium      Other reaction(s): Cramps   lipitor    Cats     Clopidogrel Bisulfate     Dogs     Hctz [Hydrochlorothiazide]      Rash on legs    Levofloxacin     Sulfasalazine Other (See Comments)     Stomach cramps        Patient Story: pt has a hx of lung cancer, COPD, and emphysema. The past couple of days pt has become more sob, and states that it feels similar to pneumonia that he has had in the past. Pt was placed on BIPAP in the ED and weaned to oxymask.     Focused Assessment:    Respiratory: stating in the 90s, shortness of breath, on oxymask 5L   Cardiac; NSR, HR In the 80s  Neuro: A+ox4    Treatments and/or interventions provided:   Labs Ordered and Resulted from Time of ED Arrival to Time of ED Departure   ISTAT GASES LACTATE VENOUS POCT - Abnormal       Result Value    Lactic Acid POCT 2.4 (*)     Bicarbonate Venous POCT 28      O2 Sat, Venous POCT 68 (*)     pCO2 Venous POCT 48      pH Venous POCT 7.38      pO2 Venous POCT 37     CBC WITH PLATELETS AND DIFFERENTIAL    WBC Count 8.7      RBC Count 5.15      Hemoglobin 15.4      Hematocrit 47.5      MCV 92      MCH 29.9      MCHC 32.4      RDW 14.6      Platelet Count 212      % Neutrophils 81      % Lymphocytes 10      % Monocytes 3      % Eosinophils 4      % Basophils 1      % Immature Granulocytes 1      NRBCs per 100 WBC 0      Absolute Neutrophils 7.2      Absolute Lymphocytes 0.9      Absolute Monocytes 0.3      Absolute Eosinophils 0.3      Absolute Basophils 0.0      Absolute Immature Granulocytes 0.1      Absolute NRBCs 0.0     PROCALCITONIN   COMPREHENSIVE METABOLIC PANEL   NT PROBNP INPATIENT   INFLUENZA A/B, RSV, & SARS-COV2 PCR   TROPONIN T, HIGH SENSITIVITY   MAGNESIUM  "  BLOOD CULTURE   BLOOD CULTURE        XR Chest Port 1 View    (Results Pending)      Patient's response to treatments and/or interventions: tolerated    To be done/followed up on inpatient unit:  frequent vitals, repeat labs and imaging    Does this patient have any cognitive concerns?:  none    Activity level - Baseline/Home:  Independent  Activity Level - Current:   Stand with assist x2    Patient's Preferred language: English   Needed?: No    Isolation: None and COVID r/o and special precautions  Infection: results pending  Patient tested for COVID 19 prior to admission: YES  Bariatric?: No    Vital Signs:   Vitals:    12/06/23 1752 12/06/23 1753 12/06/23 1809 12/06/23 1810   BP:  (!) 154/72  (!) 168/86   Pulse:   96 97   Resp:   30    Temp: 98.1  F (36.7  C)      TempSrc: Temporal      SpO2:   98%    Weight: 79.4 kg (175 lb)      Height: 1.778 m (5' 10\")          Cardiac Rhythm:Cardiac Rhythm: Normal sinus rhythm (HR in the 80s,)    Was the PSS-3 completed:   Yes  What interventions are required if any?               Family Comments:   OBS brochure/video discussed/provided to patient/family: N/A              Name of person given brochure if not patient:               Relationship to patient:     For the majority of the shift this patient's behavior was Green.   Behavioral interventions performed were .    ED NURSE PHONE NUMBER: *[phone]        "

## 2023-12-07 NOTE — PROGRESS NOTES
RECEIVING UNIT ED HANDOFF REVIEW    ED Nurse Handoff Report was reviewed by: Kaur Johnson RN on December 6, 2023 at 9:34 PM
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
## 2023-12-07 NOTE — PHARMACY-ADMISSION MEDICATION HISTORY
Pharmacist Admission Medication History    Admission medication history is complete. The information provided in this note is only as accurate as the sources available at the time of the update.    Information Source(s): Patient, Family member, Prescription bottles, and CareEverywhere/SureScripts via in-person    Pertinent Information: Patient states not knowing all his medications but he was able to recognize medications when I went through his list.   Patient did bring in his own albuterol and Trelegy inhalers.    Changes made to PTA medication list:  Added: None  Deleted: Lidocaine patch, Miralax, Prednisone taper (completed course)  Changed:   senna-docusate BID to BID PRN  Aspirin 325mg daily to 81mg BID    Allergies reviewed with patient and updates made in EHR: yes    Medication History Completed By: Batsheva Grimes RPH 12/6/2023 8:52 PM    PTA Med List   Medication Sig Last Dose    acetaminophen (TYLENOL) 500 MG tablet Take 1,000 mg by mouth every 8 hours as needed     albuterol (ACCUNEB) 1.25 MG/3ML neb solution Take 1 vial (1.25 mg) by nebulization every 4 hours as needed for shortness of breath or wheezing 12/6/2023 at x3    albuterol (PROAIR HFA/PROVENTIL HFA/VENTOLIN HFA) 108 (90 Base) MCG/ACT inhaler INHALE 2 PUFFS BY MOUTH EVERY 6 HOURS AS NEEDED FOR WHEEZE OR FOR SHORTNESS OF BREATH     alendronate (FOSAMAX) 70 MG tablet Take 1 tablet (70 mg) by mouth every 7 days 12/4/2023    amLODIPine (NORVASC) 5 MG tablet Take 1 tablet (5 mg) by mouth at bedtime 12/5/2023    ascorbic acid 1000 MG TABS tablet Take 1,000 mg by mouth daily 12/6/2023    aspirin 81 MG EC tablet Take 81 mg by mouth 2 times daily 12/6/2023 at am    azithromycin (ZITHROMAX) 500 MG tablet Take 1 tablet (500 mg) by mouth every other day unknown    calcium carbonate (OS-KARLIE) 1500 (600 Ca) MG tablet Take 600 mg by mouth daily 12/6/2023    cholecalciferol 25 MCG (1000 UT) TABS Take 1,000 Units by mouth daily  12/6/2023    cyclobenzaprine  (FLEXERIL) 5 MG tablet Take 1 tablet (5 mg) by mouth nightly as needed for muscle spasms 12/5/2023 at hs    DULoxetine (CYMBALTA) 20 MG capsule Take 1 capsule (20 mg) by mouth 2 times daily 12/6/2023 at am    Fluticasone-Umeclidin-Vilant (TRELEGY ELLIPTA) 100-62.5-25 MCG/ACT oral inhaler Inhale 1 puff into the lungs daily 12/6/2023 at am    furosemide (LASIX) 20 MG tablet TAKE 1 TABLET BY MOUTH EVERY DAY 12/6/2023 at am    gabapentin (NEURONTIN) 300 MG capsule Take 2 capsules (600 mg) by mouth 2 times daily Complete on 7/17 12/6/2023 at am    guaiFENesin (MUCINEX) 600 MG 12 hr tablet Take 600 mg by mouth 2 times daily as needed for congestion     hydrocortisone 2.5 % cream Apply topically 2 times daily as needed for itching     levothyroxine (SYNTHROID/LEVOTHROID) 125 MCG tablet Take 1 tablet (125 mcg) by mouth daily 12/6/2023 at am    MegaRed Omega-3 Krill Oil 500 MG CAPS Take 500 mg by mouth daily 12/6/2023 at am    Melatonin 10 MG TABS tablet Take 10 mg by mouth At Bedtime 12/5/2023 at hs    metoprolol succinate ER (TOPROL XL) 50 MG 24 hr tablet TAKE 1 TABLET (50 MG) BY MOUTH DAILY (TAKE WITH 25MG TABLET FOR TOTAL 75MG DAILY) (Patient taking differently: 50 mg every evening) 12/6/2023 at am    multivitamin w/minerals (THERA-VIT-M) tablet Take 1 tablet by mouth daily  12/6/2023 at am    naloxone (NARCAN) 4 MG/0.1ML nasal spray Spray 1 spray (4 mg) into one nostril alternating nostrils once as needed for opioid reversal every 2-3 minutes until assistance arrives     naproxen (NAPROSYN) 500 MG tablet Take 1 tablet (500 mg) by mouth 3 times daily (with meals) 12/6/2023 at x1    nitroGLYcerin (NITROSTAT) 0.4 MG sublingual tablet FOR CHEST PAIN PLACE 1 TAB UNDER TONGUE EVERY 5 MIN FOR 3 DOSES. IF SYMPTOMS PERSIST 5 MIN AFTER 1ST DOSE CALL 911     oxyCODONE (ROXICODONE) 5 MG tablet Take 1 tablet (5 mg) by mouth every 6 hours as needed for pain 12/5/2023    potassium chloride ER (KLOR-CON M) 20 MEQ CR tablet Take 1  tablet (20 mEq) by mouth daily (with lunch) Prescribed as twice daily but pt is only taking it daily at lunch. (Patient taking differently: Take 20 mEq by mouth daily (with lunch)) 12/6/2023 at am    predniSONE (DELTASONE) 1 MG tablet Take 2 tablets (2 mg) by mouth daily For COPD 12/6/2023 at am    rosuvastatin (CRESTOR) 10 MG tablet TAKE 1 TABLET BY MOUTH EVERY DAY (Patient taking differently: Take 10 mg by mouth every evening) 12/5/2023 at hs    senna-docusate (SENOKOT-S/PERICOLACE) 8.6-50 MG tablet Take 1 tablet by mouth 2 times daily as needed for constipation     traZODone (DESYREL) 50 MG tablet Take 50 mg by mouth At Bedtime 12/5/2023 at hs

## 2023-12-07 NOTE — PLAN OF CARE
Goal Outcome Evaluation:             Summary: SOB d/t COPD exacerbation.   DATE & TIME: 12/7/23 8834-6462    Cognitive Concerns/ Orientation : A&Ox4   BEHAVIOR & AGGRESSION TOOL COLOR: Green  CIWA SCORE: n/a   ABNL VS/O2: On 2L of NC,  neb Q4h, HTN  MOBILITY:IND  PAIN MANAGMENT: Chronic low back pain. Oxy given x1, scheduled naproxen  DIET: Regular   BOWEL/BLADDER: Continent, c/o constipation laxative given.  ABNL LAB/BG: WDL, starting BGM d/t increase in steroids  DRAIN/DEVICES: 2 PIV IV SL  TELEMETRY RHYTHM: n/a  SKIN: Scattered bruising, scabs  TESTS/PROCEDURES: NA  D/C DAY/GOALS/PLACE: Pending  clinical improvement   OTHER IMPORTANT INFO:   ACEVES, lungs are diminished with expiratory wheezing  Pt home meds sent to the pharmacy. IP consulted

## 2023-12-07 NOTE — PROGRESS NOTES
MD Notification    Notified Person: MD    Notified Person Name: Bryson Lopez    Notification Date/Time: 12/07/23 @ 0120    Notification Interaction:    Purpose of Notification: The patient's Lactic acid is 3.9    Orders Received: Ordered 500 mL Bolus    Comments:

## 2023-12-07 NOTE — PROGRESS NOTES
[facility]    Hospitalist Progress Note    Interval History   Patient awake and alert.  Continues to remain very wheezy and tachypneic.  Denies any active chest pain.  Denies any sputum production.    -Data reviewed today: I reviewed all new labs and imaging results over the last 24 hours. I personally reviewed the chest x-ray image(s) showing faint residual opacities in the right upper lobe corresponding to the previous mass and posttreatment changes.  No other focal opacities. .    Physical Exam   Temp: 98.2  F (36.8  C) Temp src: Oral BP: (!) 143/69 Pulse: 76   Resp: 18 SpO2: 96 % O2 Device: Nasal cannula Oxygen Delivery: 2 LPM  Vitals:    12/06/23 1752 12/06/23 2200   Weight: 79.4 kg (175 lb) 82.1 kg (181 lb)     Vital Signs with Ranges  Temp:  [97.7  F (36.5  C)-98.6  F (37  C)] 98.2  F (36.8  C)  Pulse:  [70-97] 76  Resp:  [18-40] 18  BP: (132-171)/(61-86) 143/69  SpO2:  [93 %-99 %] 96 %  I/O last 3 completed shifts:  In: 240 [P.O.:240]  Out: -     Physical Exam  Constitutional:       Appearance: He is ill-appearing.   Cardiovascular:      Rate and Rhythm: Regular rhythm. Tachycardia present.      Pulses: Normal pulses.      Heart sounds: Murmur heard.   Pulmonary:      Effort: Respiratory distress present.      Breath sounds: Normal breath sounds.      Comments: Diffuse bilateral expiratory wheezing.  No significant inspiratory movement.  Rhonchi present.  Abdominal:      General: Abdomen is flat. Bowel sounds are normal. There is no distension.      Tenderness: There is no abdominal tenderness. There is no guarding.   Skin:     General: Skin is warm and dry.   Neurological:      General: No focal deficit present.           Medications      amLODIPine  5 mg Oral At Bedtime    aspirin  81 mg Oral BID    azithromycin  250 mg Oral Daily    DULoxetine  20 mg Oral BID    Fluticasone-Umeclidin-Vilant  1 puff Inhalation Daily    furosemide  20 mg Oral Daily    gabapentin  600 mg Oral BID     insulin aspart  1-7 Units Subcutaneous TID AC    insulin aspart  1-5 Units Subcutaneous At Bedtime    ipratropium - albuterol 0.5 mg/2.5 mg/3 mL  3 mL Nebulization Q4H    levothyroxine  125 mcg Oral Daily    methylPREDNISolone  62.5 mg Intravenous Q8H    metoprolol succinate ER  50 mg Oral QPM    naproxen  500 mg Oral TID w/meals    rosuvastatin  10 mg Oral QPM    sodium chloride (PF)  3 mL Intracatheter Q8H    traZODone  50 mg Oral At Bedtime       Data   Recent Labs   Lab 12/06/23  1801 12/01/23  0749   WBC 8.7 8.7   HGB 15.4 15.0   MCV 92 95    204    141   POTASSIUM 4.3 4.5   CHLORIDE 102 103   CO2 22 26   BUN 17.6 13.9   CR 0.87 1.00   ANIONGAP 15 12   KARLIE 9.5 9.4   * 96   ALBUMIN 3.9 4.4   PROTTOTAL 7.3 7.3   BILITOTAL 0.3 0.5   ALKPHOS 179* 135   ALT 24 23   AST 21 23       Recent Results (from the past 24 hour(s))   XR Chest Port 1 View    Narrative    EXAM: XR CHEST PORT 1 VIEW  LOCATION: [facility]  DATE: 12/6/2023    INDICATION: SOB  COMPARISON: CT chest 10/26/2023      Impression    IMPRESSION: Faint residual opacities in the right upper lobe corresponding to right upper lobe mass and posttreatment changes. No other focal opacities. No pleural effusion or pneumothorax. Normal cardiomediastinal silhouette.         Assessment & Plan      Harrison Thomas is a 78 year old male with past medical history significant for chronic hypoxic respiratory failure, COPD, RUL lung mass s/p radiation, untreated BRENNEN, HTN, HLD, CAD, hypothyroidism admitted on 12/6/2023 with acute on chronic hypoxic respiratory failure likely 2/2 COPD exacerbation.      COPD exacerbation  Acute on Chronic hypoxemic respiratory failure   Lung mass s/p radiation treatment and probable radiation pneumonitis   BRENNEN   Pt presents to the ED with increased shortness of breath over the past week. He uses O2 at home as needed, but has using it much more recently. He has been trying nebs at home and did  get a prescription for prednisone yesterday.  * Clinical picture is consistent with COPD exacerbation. He has poor air movement on pulmonary auscultation and diffuse expiratory wheezing. BNP and procalcitonin are within normal limits. COVID, influenza and RSV panel is negative. CXR showed just faint residual opacities in the RUL corresponding to RUL mass and post-treatment changes. No other abnormalities.   * He was initially placed on BiPAP in the ED for work of breathing, but has since been weaned off. He was also treated with duonebs, mag and methylprednisolone.   * Currently he is satting well, but still endorsing dyspnea. He felt a little better with a higher flow rate of O2 despite a normal O2 sat. I did offer to resume BiPAP, but pt declined.   - Admit to inpatient  - Continuous oximetry and supplemental O2 as needed   -On DuoNebs 4 times a day  -Increase Solu-Medrol to 60 mg every 8 hours  -Continue home trilogy with his own supplies.  -Currently on 4 L nasal cannula.  Uses home oxygen 2 L at night.  - Start treatment dose azithromycin (pt s on every other day dosing for prophylaxis)      Hypertension   Hyperlipidemia  Coronary artery disease   - Continue PTA amlodipine   - COntinue PTA ASA and rosuvastatin   - COntinue PTA metoprolol and amlodpine   - COntinue PTA lasix      Chronic low back pain   - Continue PTA oxycodone, gabapentin and naproxen   - Pt is supposed to be getting lumbar MRI soon      Hx of papillary thyroid cancer s/p thyroidectomy   Hypothyroidism   - Continue PTA levothyroxine      Insomnia   - Continue TPA Trazodone      Diet:Regular diet   DVT Prophylaxis: Pneumatic Compression Devices  Mir Catheter: Not present  Lines: None     Cardiac Monitoring: None  Code Status: DNR/DNI               Clinically Significant Risk Factors Present on Admission                # Drug Induced Platelet Defect: home medication list includes an antiplatelet medication   # Hypertension: Noted on problem  "list      # Overweight: Estimated body mass index is 25.97 kg/m  as calculated from the following:    Height as of this encounter: 1.778 m (5' 10\").    Weight as of this encounter: 82.1 kg (181 lb).              Elaina Quintero MD, MD  [phone](p)   "

## 2023-12-08 LAB
ALBUMIN SERPL BCG-MCNC: 3.4 G/DL (ref 3.5–5.2)
ALP SERPL-CCNC: 155 U/L (ref 40–150)
ALT SERPL W P-5'-P-CCNC: 19 U/L (ref 0–70)
ANION GAP SERPL CALCULATED.3IONS-SCNC: 9 MMOL/L (ref 7–15)
AST SERPL W P-5'-P-CCNC: 11 U/L (ref 0–45)
BILIRUB DIRECT SERPL-MCNC: <0.2 MG/DL (ref 0–0.3)
BILIRUB SERPL-MCNC: 0.4 MG/DL
BUN SERPL-MCNC: 25 MG/DL (ref 8–23)
CALCIUM SERPL-MCNC: 8.8 MG/DL (ref 8.8–10.2)
CHLORIDE SERPL-SCNC: 106 MMOL/L (ref 98–107)
CREAT SERPL-MCNC: 0.89 MG/DL (ref 0.67–1.17)
DEPRECATED HCO3 PLAS-SCNC: 26 MMOL/L (ref 22–29)
EGFRCR SERPLBLD CKD-EPI 2021: 89 ML/MIN/1.73M2
ERYTHROCYTE [DISTWIDTH] IN BLOOD BY AUTOMATED COUNT: 14.7 % (ref 10–15)
GLUCOSE BLDC GLUCOMTR-MCNC: 107 MG/DL (ref 70–99)
GLUCOSE BLDC GLUCOMTR-MCNC: 127 MG/DL (ref 70–99)
GLUCOSE BLDC GLUCOMTR-MCNC: 139 MG/DL (ref 70–99)
GLUCOSE BLDC GLUCOMTR-MCNC: 145 MG/DL (ref 70–99)
GLUCOSE BLDC GLUCOMTR-MCNC: 166 MG/DL (ref 70–99)
GLUCOSE SERPL-MCNC: 172 MG/DL (ref 70–99)
HCT VFR BLD AUTO: 41.8 % (ref 40–53)
HGB BLD-MCNC: 13.7 G/DL (ref 13.3–17.7)
MCH RBC QN AUTO: 30.6 PG (ref 26.5–33)
MCHC RBC AUTO-ENTMCNC: 32.8 G/DL (ref 31.5–36.5)
MCV RBC AUTO: 94 FL (ref 78–100)
PLATELET # BLD AUTO: 150 10E3/UL (ref 150–450)
POTASSIUM SERPL-SCNC: 4.5 MMOL/L (ref 3.4–5.3)
PROT SERPL-MCNC: 6.4 G/DL (ref 6.4–8.3)
RBC # BLD AUTO: 4.47 10E6/UL (ref 4.4–5.9)
SODIUM SERPL-SCNC: 141 MMOL/L (ref 135–145)
WBC # BLD AUTO: 13.6 10E3/UL (ref 4–11)

## 2023-12-08 PROCEDURE — 94640 AIRWAY INHALATION TREATMENT: CPT

## 2023-12-08 PROCEDURE — 99232 SBSQ HOSP IP/OBS MODERATE 35: CPT | Performed by: HOSPITALIST

## 2023-12-08 PROCEDURE — 120N000001 HC R&B MED SURG/OB

## 2023-12-08 PROCEDURE — 250N000013 HC RX MED GY IP 250 OP 250 PS 637: Performed by: STUDENT IN AN ORGANIZED HEALTH CARE EDUCATION/TRAINING PROGRAM

## 2023-12-08 PROCEDURE — 80053 COMPREHEN METABOLIC PANEL: CPT | Performed by: HOSPITALIST

## 2023-12-08 PROCEDURE — 250N000012 HC RX MED GY IP 250 OP 636 PS 637: Performed by: HOSPITALIST

## 2023-12-08 PROCEDURE — 36415 COLL VENOUS BLD VENIPUNCTURE: CPT | Performed by: HOSPITALIST

## 2023-12-08 PROCEDURE — 94640 AIRWAY INHALATION TREATMENT: CPT | Mod: 76

## 2023-12-08 PROCEDURE — 250N000009 HC RX 250: Performed by: STUDENT IN AN ORGANIZED HEALTH CARE EDUCATION/TRAINING PROGRAM

## 2023-12-08 PROCEDURE — 250N000011 HC RX IP 250 OP 636: Mod: JZ | Performed by: HOSPITALIST

## 2023-12-08 PROCEDURE — 85027 COMPLETE CBC AUTOMATED: CPT | Performed by: HOSPITALIST

## 2023-12-08 PROCEDURE — 999N000157 HC STATISTIC RCP TIME EA 10 MIN

## 2023-12-08 RX ORDER — PANTOPRAZOLE SODIUM 40 MG/1
40 TABLET, DELAYED RELEASE ORAL
Status: DISCONTINUED | OUTPATIENT
Start: 2023-12-08 | End: 2024-01-10 | Stop reason: HOSPADM

## 2023-12-08 RX ORDER — IPRATROPIUM BROMIDE AND ALBUTEROL SULFATE 2.5; .5 MG/3ML; MG/3ML
3 SOLUTION RESPIRATORY (INHALATION) EVERY 4 HOURS PRN
Status: DISCONTINUED | OUTPATIENT
Start: 2023-12-08 | End: 2024-01-10 | Stop reason: HOSPADM

## 2023-12-08 RX ADMIN — ASPIRIN 81 MG: 81 TABLET, COATED ORAL at 21:48

## 2023-12-08 RX ADMIN — GABAPENTIN 600 MG: 300 CAPSULE ORAL at 21:48

## 2023-12-08 RX ADMIN — TRAZODONE HYDROCHLORIDE 50 MG: 50 TABLET ORAL at 21:48

## 2023-12-08 RX ADMIN — OXYCODONE HYDROCHLORIDE 5 MG: 5 TABLET ORAL at 09:43

## 2023-12-08 RX ADMIN — NAPROXEN 500 MG: 500 TABLET ORAL at 14:16

## 2023-12-08 RX ADMIN — INSULIN ASPART 1 UNITS: 100 INJECTION, SOLUTION INTRAVENOUS; SUBCUTANEOUS at 18:02

## 2023-12-08 RX ADMIN — DOCUSATE SODIUM 50 MG AND SENNOSIDES 8.6 MG 2 TABLET: 8.6; 5 TABLET, FILM COATED ORAL at 18:15

## 2023-12-08 RX ADMIN — OXYCODONE HYDROCHLORIDE 5 MG: 5 TABLET ORAL at 21:48

## 2023-12-08 RX ADMIN — LEVOTHYROXINE SODIUM 125 MCG: 125 TABLET ORAL at 09:40

## 2023-12-08 RX ADMIN — ALBUTEROL SULFATE 2.5 MG: 2.5 SOLUTION RESPIRATORY (INHALATION) at 21:55

## 2023-12-08 RX ADMIN — ASPIRIN 81 MG: 81 TABLET, COATED ORAL at 09:40

## 2023-12-08 RX ADMIN — METHYLPREDNISOLONE SODIUM SUCCINATE 62.5 MG: 125 INJECTION, POWDER, FOR SOLUTION INTRAMUSCULAR; INTRAVENOUS at 01:03

## 2023-12-08 RX ADMIN — METOPROLOL SUCCINATE 50 MG: 50 TABLET, EXTENDED RELEASE ORAL at 21:49

## 2023-12-08 RX ADMIN — DULOXETINE HYDROCHLORIDE 20 MG: 20 CAPSULE, DELAYED RELEASE ORAL at 21:48

## 2023-12-08 RX ADMIN — GABAPENTIN 600 MG: 300 CAPSULE ORAL at 09:39

## 2023-12-08 RX ADMIN — IPRATROPIUM BROMIDE AND ALBUTEROL SULFATE 3 ML: .5; 3 SOLUTION RESPIRATORY (INHALATION) at 02:52

## 2023-12-08 RX ADMIN — IPRATROPIUM BROMIDE AND ALBUTEROL SULFATE 3 ML: .5; 3 SOLUTION RESPIRATORY (INHALATION) at 07:49

## 2023-12-08 RX ADMIN — METHYLPREDNISOLONE SODIUM SUCCINATE 62.5 MG: 125 INJECTION, POWDER, FOR SOLUTION INTRAMUSCULAR; INTRAVENOUS at 09:41

## 2023-12-08 RX ADMIN — FUROSEMIDE 20 MG: 20 TABLET ORAL at 09:41

## 2023-12-08 RX ADMIN — AMLODIPINE BESYLATE 5 MG: 5 TABLET ORAL at 21:48

## 2023-12-08 RX ADMIN — ROSUVASTATIN CALCIUM 10 MG: 10 TABLET, FILM COATED ORAL at 21:49

## 2023-12-08 RX ADMIN — METHYLPREDNISOLONE SODIUM SUCCINATE 62.5 MG: 125 INJECTION, POWDER, FOR SOLUTION INTRAMUSCULAR; INTRAVENOUS at 18:00

## 2023-12-08 RX ADMIN — AZITHROMYCIN DIHYDRATE 250 MG: 250 TABLET ORAL at 09:40

## 2023-12-08 RX ADMIN — DULOXETINE HYDROCHLORIDE 20 MG: 20 CAPSULE, DELAYED RELEASE ORAL at 09:41

## 2023-12-08 RX ADMIN — NAPROXEN 500 MG: 500 TABLET ORAL at 09:40

## 2023-12-08 ASSESSMENT — ACTIVITIES OF DAILY LIVING (ADL)
ADLS_ACUITY_SCORE: 28
ADLS_ACUITY_SCORE: 26
ADLS_ACUITY_SCORE: 28
ADLS_ACUITY_SCORE: 26
ADLS_ACUITY_SCORE: 28
ADLS_ACUITY_SCORE: 26
ADLS_ACUITY_SCORE: 28
ADLS_ACUITY_SCORE: 26
ADLS_ACUITY_SCORE: 28

## 2023-12-08 NOTE — PLAN OF CARE
Goal Outcome Evaluation:  Summary: SOB d/t COPD exacerbation.   DATE & TIME: 12/8/23 4454-9598   Cognitive Concerns/ Orientation : A&Ox4   BEHAVIOR & AGGRESSION TOOL COLOR: Green  CIWA SCORE: n/a   ABNL VS/O2: VSS On 1L of NC  MOBILITY:IND with walking stick  PAIN MANAGMENT: Chronic low back pain . On scheduled naproxen  DIET: Regular   BOWEL/BLADDER: Continent  ABNL LAB/BG: , 139. WBC 13.6  DRAIN/DEVICES: 2 PIV IV SL  TELEMETRY RHYTHM: n/a  SKIN: Scattered bruising, scabs, small redness on coccyx, Scab on L foot  TESTS/PROCEDURES: NA  D/C DAY/GOALS/PLACE: Pending  clinical improvement   OTHER IMPORTANT INFO: lungs sounds inspiratory and expiratory wheezes. Receives scheduled Nebs.                          Tell patient his/her stool test is normal

## 2023-12-08 NOTE — PROGRESS NOTES
[facility]    Hospitalist Progress Note    Interval History   Patient awake and alert.  Continues to have significant tachypnea and wheezing but mildly improved from yesterday.    -Data reviewed today: I reviewed all new labs and imaging results over the last 24 hours. I personally reviewed the chest x-ray image(s) showing faint residual opacities in the right upper lobe corresponding to the previous mass and posttreatment changes.  No other focal opacities. .    Physical Exam   Temp: (!) 96.6  F (35.9  C) Temp src: Oral BP: (!) 142/72 Pulse: 76   Resp: 20 SpO2: 95 % O2 Device: None (Room air) Oxygen Delivery: 1 LPM  Vitals:    12/06/23 1752 12/06/23 2200   Weight: 79.4 kg (175 lb) 82.1 kg (181 lb)     Vital Signs with Ranges  Temp:  [96.6  F (35.9  C)-98.7  F (37.1  C)] 96.6  F (35.9  C)  Pulse:  [75-98] 76  Resp:  [17-20] 20  BP: (128-145)/(62-72) 142/72  SpO2:  [94 %-96 %] 95 %  I/O last 3 completed shifts:  In: 240 [P.O.:240]  Out: -     Physical Exam  Constitutional:       Appearance: He is ill-appearing.   Cardiovascular:      Rate and Rhythm: Regular rhythm. Tachycardia present.      Pulses: Normal pulses.      Heart sounds: Murmur heard.   Pulmonary:      Effort: Respiratory distress present.      Breath sounds: Normal breath sounds.      Comments: Diffuse bilateral expiratory wheezing.  No significant inspiratory movement.  Rhonchi present.  Abdominal:      General: Abdomen is flat. Bowel sounds are normal. There is no distension.      Tenderness: There is no abdominal tenderness. There is no guarding.   Skin:     General: Skin is warm and dry.   Neurological:      General: No focal deficit present.           Medications      amLODIPine  5 mg Oral At Bedtime    aspirin  81 mg Oral BID    azithromycin  250 mg Oral Daily    DULoxetine  20 mg Oral BID    Fluticasone-Umeclidin-Vilant  1 puff Inhalation Daily    furosemide  20 mg Oral Daily    gabapentin  600 mg Oral BID    insulin  aspart  1-7 Units Subcutaneous TID AC    insulin aspart  1-5 Units Subcutaneous At Bedtime    levothyroxine  125 mcg Oral Daily    methylPREDNISolone  62.5 mg Intravenous Q8H    metoprolol succinate ER  50 mg Oral QPM    naproxen  500 mg Oral TID w/meals    rosuvastatin  10 mg Oral QPM    sodium chloride (PF)  3 mL Intracatheter Q8H    traZODone  50 mg Oral At Bedtime       Data   Recent Labs   Lab 12/08/23  1321 12/08/23  1158 12/08/23  0937 12/07/23  1652 12/06/23  1801   WBC  --  13.6*  --   --  8.7   HGB  --  13.7  --   --  15.4   MCV  --  94  --   --  92   PLT  --  150  --   --  212   NA  --  141  --   --  139   POTASSIUM  --  4.5  --   --  4.3   CHLORIDE  --  106  --   --  102   CO2  --  26  --   --  22   BUN  --  25.0*  --   --  17.6   CR  --  0.89  --   --  0.87   ANIONGAP  --  9  --   --  15   KARLIE  --  8.8  --   --  9.5   * 172* 107*   < > 213*   ALBUMIN  --  3.4*  --   --  3.9   PROTTOTAL  --  6.4  --   --  7.3   BILITOTAL  --  0.4  --   --  0.3   ALKPHOS  --  155*  --   --  179*   ALT  --  19  --   --  24   AST  --  11  --   --  21    < > = values in this interval not displayed.       No results found for this or any previous visit (from the past 24 hour(s)).        Assessment & Plan      Harrison Thomas is a 78 year old male with past medical history significant for chronic hypoxic respiratory failure, COPD, RUL lung mass s/p radiation, untreated BRENNEN, HTN, HLD, CAD, hypothyroidism admitted on 12/6/2023 with acute on chronic hypoxic respiratory failure likely 2/2 COPD exacerbation.      COPD exacerbation  Acute on Chronic hypoxemic respiratory failure   Lung mass s/p radiation treatment and probable radiation pneumonitis   BRENNEN   Pt presents to the ED with increased shortness of breath over the past week. He uses O2 at home as needed, but has using it much more recently. He has been trying nebs at home and did get a prescription for prednisone yesterday.  * Clinical picture is consistent with  COPD exacerbation. He has poor air movement on pulmonary auscultation and diffuse expiratory wheezing. BNP and procalcitonin are within normal limits. COVID, influenza and RSV panel is negative. CXR showed just faint residual opacities in the RUL corresponding to RUL mass and post-treatment changes. No other abnormalities.   * He was initially placed on BiPAP in the ED for work of breathing, but has since been weaned off. He was also treated with duonebs, mag and methylprednisolone.    - Admit to inpatient  - Continuous oximetry and supplemental O2 as needed   -On DuoNebs 4 times a day as needed  -Currently on IV Solu-Medrol 62.5 mg every 8 hours.  Can taper down to every 12 hours tomorrow based on his clinical improvement.  -Continue home trilogy with his own supplies.  -Currently on 1 L nasal cannula.  Uses home oxygen 2 L at night.  - Start treatment dose azithromycin (pt s on every other day dosing for prophylaxis)   -Will need home oxygen evaluation prior to discharge     Hypertension   Hyperlipidemia  Coronary artery disease   - Continue PTA amlodipine   - COntinue PTA ASA and rosuvastatin   - COntinue PTA metoprolol and amlodpine   - COntinue PTA lasix      Chronic low back pain   - Continue PTA oxycodone, gabapentin and naproxen   - Pt is supposed to be getting lumbar MRI soon.  Can be done as an outpatient.  -Hold naproxen for now in the setting of high-dose steroids since he would be at high risk for upper GI bleed and ulcers.  -Will start oral PPI for prophylaxis     Hx of papillary thyroid cancer s/p thyroidectomy   Hypothyroidism   - Continue PTA levothyroxine      Insomnia   - Continue TPA Trazodone      Diet:Regular diet   DVT Prophylaxis: Pneumatic Compression Devices  Mir Catheter: Not present  Lines: None     Cardiac Monitoring: None  Code Status: DNR/DNI               Clinically Significant Risk Factors              # Hypoalbuminemia: Lowest albumin = 3.4 g/dL at 12/8/2023 11:58 AM, will monitor  "as appropriate     # Hypertension: Noted on problem list        # Overweight: Estimated body mass index is 25.97 kg/m  as calculated from the following:    Height as of this encounter: 1.778 m (5' 10\").    Weight as of this encounter: 82.1 kg (181 lb)., PRESENT ON ADMISSION            Elaina Quintero MD, MD  [phone](p)   "

## 2023-12-08 NOTE — PLAN OF CARE
Goal Outcome Evaluation:       Summary: SOB d/t COPD exacerbation.   DATE & TIME: 12/7/23-12/8/23 2899-7385    Cognitive Concerns/ Orientation : A&Ox4   BEHAVIOR & AGGRESSION TOOL COLOR: Green  CIWA SCORE: n/a   ABNL VS/O2: VSS On 2L of NC  MOBILITY:IND with walking stick  PAIN MANAGMENT: Chronic low back pain managed with PRN Oxycodone  DIET: Regular   BOWEL/BLADDER: Continent  ABNL LAB/BG:   DRAIN/DEVICES: 2 PIV IV SL  TELEMETRY RHYTHM: n/a  SKIN: Scattered bruising, scabs  TESTS/PROCEDURES: NA  D/C DAY/GOALS/PLACE: Pending  clinical improvement   OTHER IMPORTANT INFO: lungs diminished with expiratory wheezes. Receives scheduled Nebs.

## 2023-12-09 ENCOUNTER — APPOINTMENT (OUTPATIENT)
Dept: PHYSICAL THERAPY | Facility: CLINIC | Age: 78
DRG: 190 | End: 2023-12-09
Attending: HOSPITALIST
Payer: MEDICARE

## 2023-12-09 LAB
GLUCOSE BLDC GLUCOMTR-MCNC: 118 MG/DL (ref 70–99)
GLUCOSE BLDC GLUCOMTR-MCNC: 141 MG/DL (ref 70–99)
GLUCOSE BLDC GLUCOMTR-MCNC: 157 MG/DL (ref 70–99)
GLUCOSE BLDC GLUCOMTR-MCNC: 159 MG/DL (ref 70–99)
GLUCOSE BLDC GLUCOMTR-MCNC: 200 MG/DL (ref 70–99)

## 2023-12-09 PROCEDURE — 97161 PT EVAL LOW COMPLEX 20 MIN: CPT | Mod: GP | Performed by: PHYSICAL THERAPIST

## 2023-12-09 PROCEDURE — 97116 GAIT TRAINING THERAPY: CPT | Mod: GP | Performed by: PHYSICAL THERAPIST

## 2023-12-09 PROCEDURE — 250N000011 HC RX IP 250 OP 636: Mod: JZ | Performed by: HOSPITALIST

## 2023-12-09 PROCEDURE — 250N000013 HC RX MED GY IP 250 OP 250 PS 637: Performed by: STUDENT IN AN ORGANIZED HEALTH CARE EDUCATION/TRAINING PROGRAM

## 2023-12-09 PROCEDURE — 250N000013 HC RX MED GY IP 250 OP 250 PS 637: Performed by: INTERNAL MEDICINE

## 2023-12-09 PROCEDURE — 99232 SBSQ HOSP IP/OBS MODERATE 35: CPT | Performed by: HOSPITALIST

## 2023-12-09 PROCEDURE — 250N000013 HC RX MED GY IP 250 OP 250 PS 637: Performed by: HOSPITALIST

## 2023-12-09 PROCEDURE — 120N000001 HC R&B MED SURG/OB

## 2023-12-09 PROCEDURE — 97530 THERAPEUTIC ACTIVITIES: CPT | Mod: GP | Performed by: PHYSICAL THERAPIST

## 2023-12-09 RX ORDER — POLYETHYLENE GLYCOL 3350 17 G/17G
17 POWDER, FOR SOLUTION ORAL DAILY
Status: DISCONTINUED | OUTPATIENT
Start: 2023-12-09 | End: 2023-12-19

## 2023-12-09 RX ORDER — OXYCODONE HYDROCHLORIDE 5 MG/1
5 TABLET ORAL EVERY 4 HOURS PRN
Status: DISCONTINUED | OUTPATIENT
Start: 2023-12-09 | End: 2024-01-10 | Stop reason: HOSPADM

## 2023-12-09 RX ORDER — SENNOSIDES 8.6 MG
1-2 TABLET ORAL 2 TIMES DAILY
Status: DISCONTINUED | OUTPATIENT
Start: 2023-12-09 | End: 2023-12-18

## 2023-12-09 RX ADMIN — METOPROLOL SUCCINATE 50 MG: 50 TABLET, EXTENDED RELEASE ORAL at 21:59

## 2023-12-09 RX ADMIN — DULOXETINE HYDROCHLORIDE 20 MG: 20 CAPSULE, DELAYED RELEASE ORAL at 21:58

## 2023-12-09 RX ADMIN — INSULIN ASPART 1 UNITS: 100 INJECTION, SOLUTION INTRAVENOUS; SUBCUTANEOUS at 17:53

## 2023-12-09 RX ADMIN — DOCUSATE SODIUM 1 ENEMA: 283 LIQUID RECTAL at 22:57

## 2023-12-09 RX ADMIN — AZITHROMYCIN DIHYDRATE 250 MG: 250 TABLET ORAL at 09:38

## 2023-12-09 RX ADMIN — AMLODIPINE BESYLATE 5 MG: 5 TABLET ORAL at 21:59

## 2023-12-09 RX ADMIN — DULOXETINE HYDROCHLORIDE 20 MG: 20 CAPSULE, DELAYED RELEASE ORAL at 09:37

## 2023-12-09 RX ADMIN — FUROSEMIDE 20 MG: 20 TABLET ORAL at 09:38

## 2023-12-09 RX ADMIN — PANTOPRAZOLE SODIUM 40 MG: 40 TABLET, DELAYED RELEASE ORAL at 05:44

## 2023-12-09 RX ADMIN — METHYLPREDNISOLONE SODIUM SUCCINATE 62.5 MG: 125 INJECTION, POWDER, FOR SOLUTION INTRAMUSCULAR; INTRAVENOUS at 09:37

## 2023-12-09 RX ADMIN — METHYLPREDNISOLONE SODIUM SUCCINATE 62.5 MG: 125 INJECTION, POWDER, FOR SOLUTION INTRAMUSCULAR; INTRAVENOUS at 16:17

## 2023-12-09 RX ADMIN — OXYCODONE HYDROCHLORIDE 5 MG: 5 TABLET ORAL at 12:15

## 2023-12-09 RX ADMIN — SENNOSIDES 2 TABLET: 8.6 TABLET, FILM COATED ORAL at 12:15

## 2023-12-09 RX ADMIN — INSULIN ASPART 1 UNITS: 100 INJECTION, SOLUTION INTRAVENOUS; SUBCUTANEOUS at 13:35

## 2023-12-09 RX ADMIN — OXYCODONE HYDROCHLORIDE 5 MG: 5 TABLET ORAL at 21:59

## 2023-12-09 RX ADMIN — ASPIRIN 81 MG: 81 TABLET, COATED ORAL at 09:37

## 2023-12-09 RX ADMIN — ROSUVASTATIN CALCIUM 10 MG: 10 TABLET, FILM COATED ORAL at 21:59

## 2023-12-09 RX ADMIN — ASPIRIN 81 MG: 81 TABLET, COATED ORAL at 21:58

## 2023-12-09 RX ADMIN — GABAPENTIN 600 MG: 300 CAPSULE ORAL at 09:37

## 2023-12-09 RX ADMIN — POLYETHYLENE GLYCOL 3350 17 G: 17 POWDER, FOR SOLUTION ORAL at 12:15

## 2023-12-09 RX ADMIN — GABAPENTIN 600 MG: 300 CAPSULE ORAL at 21:58

## 2023-12-09 RX ADMIN — TRAZODONE HYDROCHLORIDE 50 MG: 50 TABLET ORAL at 21:59

## 2023-12-09 RX ADMIN — SENNOSIDES 2 TABLET: 8.6 TABLET, FILM COATED ORAL at 21:58

## 2023-12-09 RX ADMIN — METHYLPREDNISOLONE SODIUM SUCCINATE 62.5 MG: 125 INJECTION, POWDER, FOR SOLUTION INTRAMUSCULAR; INTRAVENOUS at 00:47

## 2023-12-09 RX ADMIN — LEVOTHYROXINE SODIUM 125 MCG: 125 TABLET ORAL at 09:38

## 2023-12-09 RX ADMIN — OXYCODONE HYDROCHLORIDE 5 MG: 5 TABLET ORAL at 05:45

## 2023-12-09 ASSESSMENT — ACTIVITIES OF DAILY LIVING (ADL)
ADLS_ACUITY_SCORE: 28

## 2023-12-09 NOTE — PLAN OF CARE
Goal Outcome Evaluation:    Summary: SOB d/t COPD exacerbation.   DATE & TIME: 12/9/23 6859-2260   Cognitive Concerns/ Orientation : A&Ox4   BEHAVIOR & AGGRESSION TOOL COLOR: Green  CIWA SCORE: n/a   ABNL VS/O2: VSS On RA, sats 93% weaned from 1L   MOBILITY:IND with walking stick - ambulated on RA around unit desat to 88% quickly recovered to 93% on RA  PAIN MANAGMENT: Chronic low back pain . PRN oxy 5mg given X1.   DIET: Regular   BOWEL/BLADDER: Continent  ABNL LAB/BG: /159  WBC 13.6  DRAIN/DEVICES: 2 PIV IV SL  TELEMETRY RHYTHM: n/a  SKIN: Scattered bruising and scabs, redness to  Scab on L foot  TESTS/PROCEDURES: NA  D/C DAY/GOALS/PLACE: Pending  clinical improvement   OTHER IMPORTANT INFO:

## 2023-12-09 NOTE — PROGRESS NOTES
[facility]    Hospitalist Progress Note    Interval History   Stable overnight.  Complains of significant back pain today and asks me to increase oxycodone frequency to every 4 hours as needed noting that he used to take it like this at home but not all the time.  Also feels that breathing is still quite tight and he feels wheezy.  Does not feel ready to go home yet.  Appears quite anxious.  No fevers.    -Data reviewed today: I reviewed all new labs and imaging results over the last 24 hours. I personally reviewed the chest x-ray image(s) showing faint residual opacities in the right upper lobe corresponding to the previous mass and posttreatment changes.  No other focal opacities. .    Physical Exam   Temp: 97.4  F (36.3  C) Temp src: Oral BP: (!) 156/82 Pulse: 57   Resp: 18 SpO2: 92 % O2 Device: Nasal cannula Oxygen Delivery: 1 LPM  Vitals:    12/06/23 1752 12/06/23 2200   Weight: 79.4 kg (175 lb) 82.1 kg (181 lb)     Vital Signs with Ranges  Temp:  [97.4  F (36.3  C)-98.5  F (36.9  C)] 97.4  F (36.3  C)  Pulse:  [57-70] 57  Resp:  [18] 18  BP: (136-156)/(75-82) 156/82  SpO2:  [92 %-94 %] 92 %  No intake/output data recorded.    Physical Exam  General: Alert, cooperative, no distress, reclined in bed  Respiratory: Nonlabored breathing, poor air movement bilaterally with diffuse expiratory wheezes  Cardiac: RRR, no edema  Abdomen: Soft, nontender, nondistended  MSK: Moving all extremities  Neuro: Alert, oriented, no facial asymmetry, fluent speech  Psych: Calm and pleasant    Medications      amLODIPine  5 mg Oral At Bedtime    aspirin  81 mg Oral BID    azithromycin  250 mg Oral Daily    DULoxetine  20 mg Oral BID    Fluticasone-Umeclidin-Vilant  1 puff Inhalation Daily    furosemide  20 mg Oral Daily    gabapentin  600 mg Oral BID    insulin aspart  1-7 Units Subcutaneous TID AC    insulin aspart  1-5 Units Subcutaneous At Bedtime    levothyroxine  125 mcg Oral Daily     methylPREDNISolone  62.5 mg Intravenous Q8H    metoprolol succinate ER  50 mg Oral QPM    pantoprazole  40 mg Oral QAM AC    polyethylene glycol  17 g Oral Daily    rosuvastatin  10 mg Oral QPM    sennosides  1-2 tablet Oral BID    sodium chloride (PF)  3 mL Intracatheter Q8H    traZODone  50 mg Oral At Bedtime       Data   Recent Labs   Lab 12/09/23  1258 12/09/23  1021 12/09/23  0216 12/08/23  1321 12/08/23  1158 12/07/23  1652 12/06/23  1801   WBC  --   --   --   --  13.6*  --  8.7   HGB  --   --   --   --  13.7  --  15.4   MCV  --   --   --   --  94  --  92   PLT  --   --   --   --  150  --  212   NA  --   --   --   --  141  --  139   POTASSIUM  --   --   --   --  4.5  --  4.3   CHLORIDE  --   --   --   --  106  --  102   CO2  --   --   --   --  26  --  22   BUN  --   --   --   --  25.0*  --  17.6   CR  --   --   --   --  0.89  --  0.87   ANIONGAP  --   --   --   --  9  --  15   KARLIE  --   --   --   --  8.8  --  9.5   * 118* 200*   < > 172*   < > 213*   ALBUMIN  --   --   --   --  3.4*  --  3.9   PROTTOTAL  --   --   --   --  6.4  --  7.3   BILITOTAL  --   --   --   --  0.4  --  0.3   ALKPHOS  --   --   --   --  155*  --  179*   ALT  --   --   --   --  19  --  24   AST  --   --   --   --  11  --  21    < > = values in this interval not displayed.       No results found for this or any previous visit (from the past 24 hour(s)).        Assessment & Plan      Harrison Thomas is a 78 year old male with past medical history significant for chronic hypoxic respiratory failure [has oxygen as needed at home], COPD, RUL lung mass s/p radiation, untreated BRENNEN, HTN, HLD, CAD, hypothyroidism admitted on 12/6/2023 with acute on chronic hypoxic respiratory failure likely 2/2 COPD exacerbation.      COPD exacerbation  Acute on Chronic hypoxemic respiratory failure   Lung mass s/p radiation treatment and probable radiation pneumonitis   BRENNEN   Pt presents to the ED with increased shortness of breath over the past week.  He uses O2 at home as needed, but has using it much more recently. He has been trying nebs at home and did get a prescription for prednisone yesterday.  * Clinical picture is consistent with COPD exacerbation. He has poor air movement on pulmonary auscultation and diffuse expiratory wheezing. BNP and procalcitonin are within normal limits. COVID, influenza and RSV panel is negative. CXR showed just faint residual opacities in the RUL corresponding to RUL mass and post-treatment changes. No other abnormalities.   * He was initially placed on BiPAP in the ED for work of breathing, but has since been weaned off. He was also treated with duonebs, mag and methylprednisolone.    - Admit to inpatient  - Continuous oximetry and supplemental O2 as needed   -Currently on IV Solu-Medrol 62.5 mg every 8 hours.  Can taper down to every 12 hours pending improvement  -Continue home trelegy Ellipta inhaler  -Currently on 1 L nasal cannula intermittently.  Uses home oxygen 2 L at night.  - Started treatment dose azithromycin (pt s on every other day dosing for prophylaxis)   -Will need home oxygen evaluation prior to discharge     Hypertension   Hyperlipidemia  Coronary artery disease   - Continue PTA amlodipine   - COntinue PTA ASA and rosuvastatin   - COntinue PTA metoprolol and amlodpine   - COntinue PTA lasix      Chronic low back pain   Constipation  - Continue PTA oxycodone, gabapentin and naproxen   - Pt is supposed to be getting lumbar MRI soon.  Can be done as an outpatient.  -Hold naproxen for now in the setting of high-dose steroids since he would be at high risk for upper GI bleed and ulcers.  -oral PPI for prophylaxis  -Started on bowel regimen as patient is constipated     Hx of papillary thyroid cancer s/p thyroidectomy   Hypothyroidism   - Continue PTA levothyroxine      Insomnia   - Continue TPA Trazodone      Diet:Regular diet   DVT Prophylaxis: Pneumatic Compression Devices  Mir Catheter: Not present  Lines:  "None     Cardiac Monitoring: None  Code Status: DNR/DNI               Clinically Significant Risk Factors              # Hypoalbuminemia: Lowest albumin = 3.4 g/dL at 12/8/2023 11:58 AM, will monitor as appropriate     # Hypertension: Noted on problem list        # Overweight: Estimated body mass index is 25.97 kg/m  as calculated from the following:    Height as of this encounter: 1.778 m (5' 10\").    Weight as of this encounter: 82.1 kg (181 lb)., PRESENT ON ADMISSION            Ashley Langley MD  "

## 2023-12-09 NOTE — PROGRESS NOTES
12/09/23 1608   Appointment Info   Signing Clinician's Name / Credentials (PT) HANNA Alvarez   Student Supervision Direct supervision provided;Line of sight supervision provided;Direct Patient Contact Provided;Therapy services provided with the co-signing licensed therapist guiding and directing the services, and providing the skilled judgement and assessment throughout the session   Rehab Comments (PT) 1 ta, 1 gait   Living Environment   People in Home alone   Current Living Arrangements house   Home Accessibility stairs to enter home   Number of Stairs, Main Entrance 1   Stair Railings, Main Entrance none   Living Environment Comments Ramp to enter as well   Self-Care   Usual Activity Tolerance moderate   Current Activity Tolerance fair   Regular Exercise No   Equipment Currently Used at Home shower chair  (Walking Stick)   Fall history within last six months yes   Number of times patient has fallen within last six months 1   Activity/Exercise/Self-Care Comment Pt drives self and performs all self-care independently.   General Information   Onset of Illness/Injury or Date of Surgery 12/06/23   Referring Physician Ashley Langley MD   Patient/Family Therapy Goals Statement (PT) Return home   Pertinent History of Current Problem (include personal factors and/or comorbidities that impact the POC) Pt is a 77 yo male who presents with COPD exacerbation. PMH is chronic hypoxic respiratory failure, COPD, RUL lung mass s/p radiation, untreated BRENNEN, HTN, HLD, CAD, hypothyroidism. Pt notes B hip fx in the past 2 yrs.   Existing Precautions/Restrictions fall   Cognition   Affect/Mental Status (Cognition) WNL   Orientation Status (Cognition) oriented x 4   Follows Commands (Cognition) WNL;follows multi-step commands;over 90% accuracy   Pain Assessment   Patient Currently in Pain Yes, see Vital Sign flowsheet  (LBP (chronic))   Integumentary/Edema   Integumentary/Edema Comments None visualized   Posture     Posture Forward head position;Protracted shoulders;Kyphosis   Range of Motion (ROM)   Range of Motion ROM is WFL   Strength (Manual Muscle Testing)   Strength (Manual Muscle Testing) Able to perform R SLR;Deficits observed during functional mobility;Able to perform L SLR   Strength Comments Decreased activity tolerance   Bed Mobility   Comment, (Bed Mobility) Pt able to perform scooting in bed and supine to long sitting with SBA.   Transfers   Comment, (Transfers) Pt performed STS with SBA.   Gait/Stairs (Locomotion)   Comment, (Gait/Stairs) Pt exhibited a wide stance gait with increased gait speed and anterior trunk lean.   Balance   Balance Comments Pt required wide TAINA when performing standing balance. Pt exhibited decreased balance when standing with narrow base of support and even greater loss of balance requiring min A in tandem stance L>R when in front.   Sensory Examination   Sensory Perception Comments Pt stated sensation intact.   Clinical Impression   Criteria for Skilled Therapeutic Intervention Yes, treatment indicated   PT Diagnosis (PT) Impaired Gait   Influenced by the following impairments Decreased activity tolerance, strength, balance   Functional limitations due to impairments Decreased independence with functional mobility   Clinical Presentation (PT Evaluation Complexity) stable   Clinical Presentation Rationale Clinical judgement   Clinical Decision Making (Complexity) low complexity   Planned Therapy Interventions (PT) balance training;bed mobility training;gait training;home exercise program;motor coordination training;neuromuscular re-education;patient/family education;postural re-education;ROM (range of motion);stair training;strengthening;stretching;transfer training;progressive activity/exercise;home program guidelines   Risk & Benefits of therapy have been explained evaluation/treatment results reviewed;care plan/treatment goals reviewed;risks/benefits reviewed;current/potential  barriers reviewed;patient   PT Total Evaluation Time   PT Eval, Low Complexity Minutes (43933) 10   Physical Therapy Goals   PT Frequency Daily   PT Predicted Duration/Target Date for Goal Attainment 12/16/23   PT Goals Bed Mobility;Transfers;Gait;PT Goal 1   PT: Bed Mobility Independent;Supine to/from sit;Rolling;Bridging   PT: Transfers Independent;Sit to/from stand;Bed to/from chair   PT: Gait Independent;Greater than 200 feet;Assistive device   PT: Goal 1 Pt will be able to verbalize energy conservation techniques in order to improve independence and safety with community ambulation.   Interventions   Interventions Quick Adds Gait Training;Therapeutic Activity   Therapeutic Activity   Therapeutic Activities: dynamic activities to improve functional performance Minutes (62854) 12   Symptoms Noted During/After Treatment Fatigue;Shortness of breath   Treatment Detail/Skilled Intervention Greeted pt in supine in bed. O2 at 93% at rest, pt on room air. Pt performed STS from EOB with walking stick with SBA. Pt performed stand to sit with SBA. Educated pt about role of PT and plan for PT going forward. Educated about pt's current activity tolerance and how that can affect home and community ambulation. Pt requested a portable O2 tank to assist with improved activity tolerance and breathing. Educated pt about gait pattern and reducing fall risks. Pt in agreeance with education and demonstrated understanding. At end of session, pt in bed, all needs in reach, VSS.   Gait Training   Gait Training Minutes (86040) 10   Symptoms Noted During/After Treatment (Gait Training) fatigue;shortness of breath   Treatment Detail/Skilled Intervention Pt ambulated 300 ft with CGAx1 with walking stick in R hand. O2 level was 93% prior to ambulation. Pt ambulated with a wide base of support  and decreased step length.  Pt did ambulate with an increased gait speed. Cued for decreased gait speed but gait pattern did not change. Cued pt for  upright posture and gaze. At 50 ft, pt began to exhibit increased respiratory rate, pt noted that they did not feel fatigued. At 150 ft, pt exhibited O2 level of 85% on the portable pulse ox with increased resp rate. Pt took a therapeutic standing break for 4 min until O2 increased to 91%. Pt also noted they felt fatigued, it also required increased effort for pt to communicate.   PT Discharge Planning   PT Plan Energy conservation education, assess O2 during ambulation, continue  gait pattern work.   PT Discharge Recommendation (DC Rec) home with home care physical therapy   PT Rationale for DC Rec Pt is below baseline for functional mobility. Pt exhibits good safety awareness but has a low activity tolerance due to COPD exacerbation and would greatly benefit from continued skilled PT with home therapy to improve impaired activity tolerance, strength and balance challenges. Currently, it would require great effort for the pt to get to an OP treatment due decreased O2 level and increased respiratory rate with short distance ambulation and activity.   PT Brief overview of current status SBA for bed mob, transfers, and CGA for ambulation   Total Session Time   Timed Code Treatment Minutes 22   Total Session Time (sum of timed and untimed services) 32

## 2023-12-09 NOTE — PLAN OF CARE
Goal Outcome Evaluation:    Date/Time:2300-7:30am    Medical    Diagnosis:COPD Exacerbation  POD#:Non- Surgical  Mental Status: A&Ox4  Activity/dangle: IND with cane  Diet: Regular  Pain: oxycodone given x1  Mir/Voiding:BR  Tele/Restraints/Iso:NA  02/LDA: 1 liter  D/C Date:TBD  Other Info: Passed a quiet shift, 02 on going at 1 liter, PIV-SL, ambulated to BR, will continue to monitor.

## 2023-12-09 NOTE — PLAN OF CARE
Goal Outcome Evaluation:  Summary: SOB d/t COPD exacerbation.   DATE & TIME: 12/8/23 5888-8481   Cognitive Concerns/ Orientation : A&Ox4   BEHAVIOR & AGGRESSION TOOL COLOR: Green  CIWA SCORE: n/a   ABNL VS/O2: VSS On 1L of NC, PRN neb done once.   MOBILITY:IND with walking stick  PAIN MANAGMENT: Chronic low back pain . PRN oxy 5mg given X1.   DIET: Regular   BOWEL/BLADDER: Continent  ABNL LAB/BG: , 166. WBC 13.6  DRAIN/DEVICES: 2 PIV IV SL  TELEMETRY RHYTHM: n/a  SKIN: Scattered bruising, scabs, small redness on coccyx, Scab on L foot  TESTS/PROCEDURES: NA  D/C DAY/GOALS/PLACE: Pending  clinical improvement   OTHER IMPORTANT INFO: lungs sounds inspiratory and expiratory wheezes. Gave PRN  Neb once.

## 2023-12-10 ENCOUNTER — APPOINTMENT (OUTPATIENT)
Dept: PHYSICAL THERAPY | Facility: CLINIC | Age: 78
DRG: 190 | End: 2023-12-10
Payer: MEDICARE

## 2023-12-10 LAB
GLUCOSE BLDC GLUCOMTR-MCNC: 101 MG/DL (ref 70–99)
GLUCOSE BLDC GLUCOMTR-MCNC: 113 MG/DL (ref 70–99)
GLUCOSE BLDC GLUCOMTR-MCNC: 115 MG/DL (ref 70–99)
GLUCOSE BLDC GLUCOMTR-MCNC: 121 MG/DL (ref 70–99)
GLUCOSE BLDC GLUCOMTR-MCNC: 139 MG/DL (ref 70–99)

## 2023-12-10 PROCEDURE — 250N000011 HC RX IP 250 OP 636: Mod: JZ | Performed by: HOSPITALIST

## 2023-12-10 PROCEDURE — 250N000013 HC RX MED GY IP 250 OP 250 PS 637: Performed by: HOSPITALIST

## 2023-12-10 PROCEDURE — 99232 SBSQ HOSP IP/OBS MODERATE 35: CPT | Performed by: HOSPITALIST

## 2023-12-10 PROCEDURE — 120N000001 HC R&B MED SURG/OB

## 2023-12-10 PROCEDURE — 250N000013 HC RX MED GY IP 250 OP 250 PS 637: Performed by: STUDENT IN AN ORGANIZED HEALTH CARE EDUCATION/TRAINING PROGRAM

## 2023-12-10 PROCEDURE — 97116 GAIT TRAINING THERAPY: CPT | Mod: GP | Performed by: PHYSICAL THERAPIST

## 2023-12-10 PROCEDURE — 97530 THERAPEUTIC ACTIVITIES: CPT | Mod: GP | Performed by: PHYSICAL THERAPIST

## 2023-12-10 RX ADMIN — GABAPENTIN 600 MG: 300 CAPSULE ORAL at 21:44

## 2023-12-10 RX ADMIN — METHYLPREDNISOLONE SODIUM SUCCINATE 62.5 MG: 125 INJECTION, POWDER, FOR SOLUTION INTRAMUSCULAR; INTRAVENOUS at 00:48

## 2023-12-10 RX ADMIN — METOPROLOL SUCCINATE 50 MG: 50 TABLET, EXTENDED RELEASE ORAL at 21:44

## 2023-12-10 RX ADMIN — POLYETHYLENE GLYCOL 3350 17 G: 17 POWDER, FOR SOLUTION ORAL at 08:56

## 2023-12-10 RX ADMIN — LEVOTHYROXINE SODIUM 125 MCG: 125 TABLET ORAL at 08:58

## 2023-12-10 RX ADMIN — AZITHROMYCIN DIHYDRATE 250 MG: 250 TABLET ORAL at 08:59

## 2023-12-10 RX ADMIN — ROSUVASTATIN CALCIUM 10 MG: 10 TABLET, FILM COATED ORAL at 21:44

## 2023-12-10 RX ADMIN — TRAZODONE HYDROCHLORIDE 50 MG: 50 TABLET ORAL at 21:44

## 2023-12-10 RX ADMIN — FUROSEMIDE 20 MG: 20 TABLET ORAL at 08:59

## 2023-12-10 RX ADMIN — DULOXETINE HYDROCHLORIDE 20 MG: 20 CAPSULE, DELAYED RELEASE ORAL at 08:58

## 2023-12-10 RX ADMIN — METHYLPREDNISOLONE SODIUM SUCCINATE 62.5 MG: 125 INJECTION, POWDER, FOR SOLUTION INTRAMUSCULAR; INTRAVENOUS at 08:59

## 2023-12-10 RX ADMIN — GABAPENTIN 600 MG: 300 CAPSULE ORAL at 08:59

## 2023-12-10 RX ADMIN — SENNOSIDES 2 TABLET: 8.6 TABLET, FILM COATED ORAL at 21:44

## 2023-12-10 RX ADMIN — SENNOSIDES 2 TABLET: 8.6 TABLET, FILM COATED ORAL at 08:58

## 2023-12-10 RX ADMIN — PANTOPRAZOLE SODIUM 40 MG: 40 TABLET, DELAYED RELEASE ORAL at 05:51

## 2023-12-10 RX ADMIN — ASPIRIN 81 MG: 81 TABLET, COATED ORAL at 21:44

## 2023-12-10 RX ADMIN — DULOXETINE HYDROCHLORIDE 20 MG: 20 CAPSULE, DELAYED RELEASE ORAL at 21:44

## 2023-12-10 RX ADMIN — ASPIRIN 81 MG: 81 TABLET, COATED ORAL at 08:59

## 2023-12-10 RX ADMIN — AMLODIPINE BESYLATE 5 MG: 5 TABLET ORAL at 21:44

## 2023-12-10 RX ADMIN — OXYCODONE HYDROCHLORIDE 5 MG: 5 TABLET ORAL at 21:45

## 2023-12-10 RX ADMIN — METHYLPREDNISOLONE SODIUM SUCCINATE 62.5 MG: 125 INJECTION, POWDER, FOR SOLUTION INTRAMUSCULAR; INTRAVENOUS at 17:08

## 2023-12-10 RX ADMIN — ACETAMINOPHEN 1000 MG: 500 TABLET, FILM COATED ORAL at 15:12

## 2023-12-10 RX ADMIN — OXYCODONE HYDROCHLORIDE 5 MG: 5 TABLET ORAL at 12:40

## 2023-12-10 ASSESSMENT — ACTIVITIES OF DAILY LIVING (ADL)
ADLS_ACUITY_SCORE: 28

## 2023-12-10 NOTE — PLAN OF CARE
Goal Outcome Evaluation:  Summary: SOB d/t COPD exacerbation.   DATE & TIME: 12/9/23 3004-4486   Cognitive Concerns/ Orientation : A&Ox4   BEHAVIOR & AGGRESSION TOOL COLOR: Green  CIWA SCORE: n/a   ABNL VS/O2: VSS On RA, sats 93% OK to be at 88%  MOBILITY:IND with walking stick - ambulated on RA around unit desat to 88% quickly recovered to 93% on RA  PAIN MANAGMENT: Chronic low back pain . PRN oxy 5mg given X1.   DIET: Regular   BOWEL/BLADDER: Continent  ABNL LAB/BG: , 157  DRAIN/DEVICES:  PIV IV SL  TELEMETRY RHYTHM: n/a  SKIN: Scattered bruising and scabs, redness to  Scab on L foot  TESTS/PROCEDURES: NA  D/C DAY/GOALS/PLACE: Pending  clinical improvement   OTHER IMPORTANT INFO: LS still very wheezy. Infrequent nonproductive cough. NO  BM since 12/5. Once time order for ENEMEEZ givne at end of the shift.
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
## 2023-12-10 NOTE — PROGRESS NOTES
[facility]    Hospitalist Progress Note    Interval History   Stable overnight.  Patient had just gotten back into bed after short walk in the room, he was significantly out of breath and took him a long time to recover.  Not on oxygen but gets significantly dyspneic with minimal activity.  No fever, did not complain of any chest pain.  He asked about getting portable oxygen for home noting that the oxygen he has right now is a big unit and he is not able to take it outside his home.    -Data reviewed today: I reviewed all new labs and imaging results over the last 24 hours. I personally reviewed the chest x-ray image(s) showing faint residual opacities in the right upper lobe corresponding to the previous mass and posttreatment changes.  No other focal opacities. .    Physical Exam   Temp: 98.5  F (36.9  C) Temp src: Oral BP: (!) 156/78 Pulse: 62   Resp: 18 SpO2: 92 % O2 Device: None (Room air) Oxygen Delivery: 1 LPM  Vitals:    12/06/23 1752 12/06/23 2200   Weight: 79.4 kg (175 lb) 82.1 kg (181 lb)     Vital Signs with Ranges  Temp:  [97  F (36.1  C)-98.5  F (36.9  C)] 98.5  F (36.9  C)  Pulse:  [61-67] 62  Resp:  [18] 18  BP: (112-170)/(62-83) 156/78  SpO2:  [91 %-93 %] 92 %  No intake/output data recorded.    Physical Exam  General: Alert, cooperative, dyspneic with minimal activity, reclined in bed  Respiratory: Mildly labored breathing, poor air movement bilaterally with diffuse expiratory wheezes  Cardiac: RRR, no edema  Abdomen: Soft, nontender, nondistended  MSK: Moving all extremities  Neuro: Alert, oriented, no facial asymmetry, fluent speech  Psych: Appears a bit anxious with mildly labored breathing    Medications      amLODIPine  5 mg Oral At Bedtime    aspirin  81 mg Oral BID    DULoxetine  20 mg Oral BID    Fluticasone-Umeclidin-Vilant  1 puff Inhalation Daily    furosemide  20 mg Oral Daily    gabapentin  600 mg Oral BID    insulin aspart  1-7 Units Subcutaneous TID AC     insulin aspart  1-5 Units Subcutaneous At Bedtime    levothyroxine  125 mcg Oral Daily    methylPREDNISolone  62.5 mg Intravenous Q8H    metoprolol succinate ER  50 mg Oral QPM    pantoprazole  40 mg Oral QAM AC    polyethylene glycol  17 g Oral Daily    rosuvastatin  10 mg Oral QPM    sennosides  1-2 tablet Oral BID    sodium chloride (PF)  3 mL Intracatheter Q8H    traZODone  50 mg Oral At Bedtime       Data   Recent Labs   Lab 12/10/23  1146 12/10/23  0838 12/10/23  0200 12/08/23  1321 12/08/23  1158 12/07/23  1652 12/06/23  1801   WBC  --   --   --   --  13.6*  --  8.7   HGB  --   --   --   --  13.7  --  15.4   MCV  --   --   --   --  94  --  92   PLT  --   --   --   --  150  --  212   NA  --   --   --   --  141  --  139   POTASSIUM  --   --   --   --  4.5  --  4.3   CHLORIDE  --   --   --   --  106  --  102   CO2  --   --   --   --  26  --  22   BUN  --   --   --   --  25.0*  --  17.6   CR  --   --   --   --  0.89  --  0.87   ANIONGAP  --   --   --   --  9  --  15   KARLIE  --   --   --   --  8.8  --  9.5   * 115* 113*   < > 172*   < > 213*   ALBUMIN  --   --   --   --  3.4*  --  3.9   PROTTOTAL  --   --   --   --  6.4  --  7.3   BILITOTAL  --   --   --   --  0.4  --  0.3   ALKPHOS  --   --   --   --  155*  --  179*   ALT  --   --   --   --  19  --  24   AST  --   --   --   --  11  --  21    < > = values in this interval not displayed.       No results found for this or any previous visit (from the past 24 hour(s)).        Assessment & Plan      Harrison Thomas is a 78 year old male with past medical history significant for chronic hypoxic respiratory failure [has oxygen as needed at home], COPD, RUL lung mass s/p radiation, untreated BRENNEN, HTN, HLD, CAD, hypothyroidism admitted on 12/6/2023 with acute on chronic hypoxic respiratory failure likely 2/2 COPD exacerbation.      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 He uses O2 at home as needed, but has using it much more recently. He has been trying nebs at home and did get a prescription for prednisone yesterday.  * Clinical picture is consistent with COPD exacerbation. He has poor air movement on pulmonary auscultation and diffuse expiratory wheezing. BNP and procalcitonin are within normal limits. COVID, influenza and RSV panel is negative. CXR showed just faint residual opacities in the RUL corresponding to RUL mass and post-treatment changes. No other abnormalities.   * He was initially placed on BiPAP in the ED for work of breathing, but has since been weaned off. He was also treated with duonebs, mag and methylprednisolone.    - Admit to inpatient  - Continuous oximetry and supplemental O2 as needed   -Currently on IV Solu-Medrol 62.5 mg every 8 hours.  Can taper down to every 12 hours pending improvement  -Continue home trelegy Ellipta inhaler  -Currently on 1 L nasal cannula intermittently.  Uses home oxygen 2 L at night.  - Started treatment dose azithromycin (pt s on every other day dosing for prophylaxis)   -Will need home oxygen evaluation prior to discharge  -Follows with Dr. Handy with pulmonology at [facility], last seen in September 2023.  Family apparently asking about home BiPAP.  Placed consult to pulmonology for recommendations regarding possible BiPAP at home, further COPD management given patient still significantly symptomatic from advanced COPD.  -Discussed with CC who will talk with patient regarding portable home oxygen unit.     Hypertension   Hyperlipidemia  Coronary artery disease   - Continue PTA amlodipine   - COntinue PTA ASA and rosuvastatin   - COntinue PTA metoprolol and amlodpine   - COntinue PTA lasix      Chronic low back pain   Constipation  - Continue PTA oxycodone, gabapentin and naproxen   - Pt is supposed to be getting lumbar MRI soon.  Can be  "done as an outpatient.  -Hold naproxen for now in the setting of high-dose steroids since he would be at high risk for upper GI bleed and ulcers.  -oral PPI for prophylaxis  -Started on bowel regimen as patient is constipated     Hx of papillary thyroid cancer s/p thyroidectomy   Hypothyroidism   - Continue PTA levothyroxine      Insomnia   - Continue TPA Trazodone      Diet:Regular diet   DVT Prophylaxis: Pneumatic Compression Devices  Mir Catheter: Not present  Lines: None     Cardiac Monitoring: None  Code Status: DNR/DNI               Clinically Significant Risk Factors              # Hypoalbuminemia: Lowest albumin = 3.4 g/dL at 12/8/2023 11:58 AM, will monitor as appropriate     # Hypertension: Noted on problem list        # Overweight: Estimated body mass index is 25.97 kg/m  as calculated from the following:    Height as of this encounter: 1.778 m (5' 10\").    Weight as of this encounter: 82.1 kg (181 lb)., PRESENT ON ADMISSION            Ashley Langley MD  "

## 2023-12-10 NOTE — PLAN OF CARE
Summary: SOB d/t COPD exacerbation.   DATE & TIME: 12/9-12/10 2729-5193  Cognitive Concerns/ Orientation : A&Ox4   BEHAVIOR & AGGRESSION TOOL COLOR: Green  ABNL VS/O2: VSS On RA, sats low 90s OK to be at 88%  MOBILITY:IND with cane - desats to 88 when ambulating, recovers quickly  PAIN MANAGMENT: Chronic low back pain .   DIET: Regular   BOWEL/BLADDER: Continent  ABNL LAB/BG:   DRAIN/DEVICES:  PIV IV SL  TELEMETRY RHYTHM: n/a  SKIN: Scattered bruising and scabs, redness to  Scab on L foot  TESTS/PROCEDURES: NA  D/C DAY/GOALS/PLACE: Pending  clinical improvement   OTHER IMPORTANT INFO: LS still very wheezy. Infrequent nonproductive cough. Small BM on this shift

## 2023-12-10 NOTE — CONSULTS
Care Management Initial Consult    General Information  Assessment completed with: Patient,    Type of CM/SW Visit: Initial Assessment    Primary Care Provider verified and updated as needed: Yes   Readmission within the last 30 days:        Reason for Consult: discharge planning  Advance Care Planning: Advance Care Planning Reviewed: present on chart          Communication Assessment  Patient's communication style: spoken language (English or Bilingual)    Hearing Difficulty or Deaf: no   Wear Glasses or Blind: yes    Cognitive  Cognitive/Neuro/Behavioral: WDL  Level of Consciousness: alert  Arousal Level: opens eyes spontaneously  Orientation: oriented x 4  Mood/Behavior: cooperative, calm  Best Language: 0 - No aphasia  Speech: logical, clear    Living Environment:   People in home: alone     Current living Arrangements: house      Able to return to prior arrangements: yes       Family/Social Support:  Care provided by: self  Provides care for: no one  Marital Status:   Children          Description of Support System: Involved    Support Assessment: Adequate family and caregiver support    Current Resources:   Patient receiving home care services: No     Community Resources: None  Equipment currently used at home: shower chair (Walking Stick)  Supplies currently used at home:      Employment/Financial:  Employment Status: retired        Financial Concerns:     Referral to Financial Worker: No       Does the patient's insurance plan have a 3 day qualifying hospital stay waiver?  Yes     Which insurance plan 3 day waiver is available? [employer]    Will the waiver be used for post-acute placement? No    Lifestyle & Psychosocial Needs:  Social Determinants of Health     Food Insecurity: Low Risk  (11/30/2023)    Food Insecurity     Within the past 12 months, did you worry that your food would run out before you got money to buy more?: No     Within the past 12 months, did the food you bought just not last  and you didn t have money to get more?: No   Depression: Not at risk (10/13/2023)    PHQ-2     PHQ-2 Score: 1   Housing Stability: Low Risk  (11/30/2023)    Housing Stability     Do you have housing? : Yes     Are you worried about losing your housing?: No   Tobacco Use: Medium Risk (12/1/2023)    Patient History     Smoking Tobacco Use: Former     Smokeless Tobacco Use: Never     Passive Exposure: Not on file   Financial Resource Strain: Low Risk  (11/30/2023)    Financial Resource Strain     Within the past 12 months, have you or your family members you live with been unable to get utilities (heat, electricity) when it was really needed?: No   Alcohol Use: Not on file   Transportation Needs: Low Risk  (11/30/2023)    Transportation Needs     Within the past 12 months, has lack of transportation kept you from medical appointments, getting your medicines, non-medical meetings or appointments, work, or from getting things that you need?: No   Physical Activity: Not on file   Interpersonal Safety: Low Risk  (10/13/2023)    Interpersonal Safety     Do you feel physically and emotionally safe where you currently live?: Yes     Within the past 12 months, have you been hit, slapped, kicked or otherwise physically hurt by someone?: No     Within the past 12 months, have you been humiliated or emotionally abused in other ways by your partner or ex-partner?: No   Stress: Not on file   Social Connections: Not on file       Functional Status:  Prior to admission patient needed assistance:              Mental Health Status:  Mental Health Status: No Current Concerns       Chemical Dependency Status:  Chemical Dependency Status: No Current Concerns             Values/Beliefs:  Spiritual, Cultural Beliefs, Jew Practices, Values that affect care: no               Additional Information:  SW consult for discharge planning. SW reviewed chart. PT recommends discharge home with home care. SW met with pt. He lives alone in his  own home. He is  from his wife. He states he is independent with cares and has no community services. He does use O2 at night from [facility]. He has a portable concentrator, but states it is heavy and awkward to take in to the community. Ideally, he would like a something to carry around his shoulder. He is agreeable to home care. Explained, per policy, a referral will be sent to [facility]. He understood and has no preference. Referral sent. He sees Dr Dozier. Confirmed home address and phone number are correct on face sheet. SW/RN CC following for discharge planning.      KINA Chinchilla, LICSW  [phone] Desk phone  [phone] Cell/text (Preferred)  [facility]

## 2023-12-11 ENCOUNTER — APPOINTMENT (OUTPATIENT)
Dept: GENERAL RADIOLOGY | Facility: CLINIC | Age: 78
DRG: 190 | End: 2023-12-11
Attending: INTERNAL MEDICINE
Payer: MEDICARE

## 2023-12-11 ENCOUNTER — APPOINTMENT (OUTPATIENT)
Dept: PHYSICAL THERAPY | Facility: CLINIC | Age: 78
DRG: 190 | End: 2023-12-11
Payer: MEDICARE

## 2023-12-11 ENCOUNTER — APPOINTMENT (OUTPATIENT)
Dept: CT IMAGING | Facility: CLINIC | Age: 78
DRG: 190 | End: 2023-12-11
Attending: INTERNAL MEDICINE
Payer: MEDICARE

## 2023-12-11 LAB
BACTERIA BLD CULT: NO GROWTH
BACTERIA BLD CULT: NO GROWTH
GLUCOSE BLDC GLUCOMTR-MCNC: 156 MG/DL (ref 70–99)
GLUCOSE BLDC GLUCOMTR-MCNC: 181 MG/DL (ref 70–99)
GLUCOSE BLDC GLUCOMTR-MCNC: 184 MG/DL (ref 70–99)
GLUCOSE BLDC GLUCOMTR-MCNC: 217 MG/DL (ref 70–99)
GLUCOSE BLDC GLUCOMTR-MCNC: 97 MG/DL (ref 70–99)

## 2023-12-11 PROCEDURE — 250N000013 HC RX MED GY IP 250 OP 250 PS 637: Performed by: HOSPITALIST

## 2023-12-11 PROCEDURE — 250N000011 HC RX IP 250 OP 636: Mod: JZ | Performed by: HOSPITALIST

## 2023-12-11 PROCEDURE — 97116 GAIT TRAINING THERAPY: CPT | Mod: GP | Performed by: PHYSICAL THERAPIST

## 2023-12-11 PROCEDURE — 250N000013 HC RX MED GY IP 250 OP 250 PS 637: Performed by: STUDENT IN AN ORGANIZED HEALTH CARE EDUCATION/TRAINING PROGRAM

## 2023-12-11 PROCEDURE — 76000 FLUOROSCOPY <1 HR PHYS/QHP: CPT

## 2023-12-11 PROCEDURE — 97112 NEUROMUSCULAR REEDUCATION: CPT | Mod: GP | Performed by: PHYSICAL THERAPIST

## 2023-12-11 PROCEDURE — 250N000009 HC RX 250: Performed by: STUDENT IN AN ORGANIZED HEALTH CARE EDUCATION/TRAINING PROGRAM

## 2023-12-11 PROCEDURE — 97530 THERAPEUTIC ACTIVITIES: CPT | Mod: GP | Performed by: PHYSICAL THERAPIST

## 2023-12-11 PROCEDURE — 99232 SBSQ HOSP IP/OBS MODERATE 35: CPT | Performed by: HOSPITALIST

## 2023-12-11 PROCEDURE — 120N000001 HC R&B MED SURG/OB

## 2023-12-11 PROCEDURE — 71275 CT ANGIOGRAPHY CHEST: CPT | Mod: ME

## 2023-12-11 PROCEDURE — 250N000011 HC RX IP 250 OP 636: Performed by: STUDENT IN AN ORGANIZED HEALTH CARE EDUCATION/TRAINING PROGRAM

## 2023-12-11 RX ORDER — PREDNISONE 20 MG/1
60 TABLET ORAL DAILY
Status: DISCONTINUED | OUTPATIENT
Start: 2023-12-11 | End: 2023-12-17

## 2023-12-11 RX ORDER — IOPAMIDOL 755 MG/ML
68 INJECTION, SOLUTION INTRAVASCULAR ONCE
Status: COMPLETED | OUTPATIENT
Start: 2023-12-11 | End: 2023-12-11

## 2023-12-11 RX ORDER — CYCLOBENZAPRINE HCL 5 MG
5 TABLET ORAL EVERY 8 HOURS PRN
Status: DISCONTINUED | OUTPATIENT
Start: 2023-12-11 | End: 2023-12-12

## 2023-12-11 RX ORDER — AZITHROMYCIN 250 MG/1
500 TABLET, FILM COATED ORAL EVERY OTHER DAY
Status: DISCONTINUED | OUTPATIENT
Start: 2023-12-12 | End: 2024-01-10 | Stop reason: HOSPADM

## 2023-12-11 RX ADMIN — AMLODIPINE BESYLATE 5 MG: 5 TABLET ORAL at 21:55

## 2023-12-11 RX ADMIN — ASPIRIN 81 MG: 81 TABLET, COATED ORAL at 08:08

## 2023-12-11 RX ADMIN — DULOXETINE HYDROCHLORIDE 20 MG: 20 CAPSULE, DELAYED RELEASE ORAL at 21:55

## 2023-12-11 RX ADMIN — LEVOTHYROXINE SODIUM 125 MCG: 125 TABLET ORAL at 08:09

## 2023-12-11 RX ADMIN — ROSUVASTATIN CALCIUM 10 MG: 10 TABLET, FILM COATED ORAL at 19:21

## 2023-12-11 RX ADMIN — ASPIRIN 81 MG: 81 TABLET, COATED ORAL at 21:55

## 2023-12-11 RX ADMIN — METHYLPREDNISOLONE SODIUM SUCCINATE 62.5 MG: 125 INJECTION, POWDER, FOR SOLUTION INTRAMUSCULAR; INTRAVENOUS at 08:14

## 2023-12-11 RX ADMIN — IOPAMIDOL 68 ML: 755 INJECTION, SOLUTION INTRAVENOUS at 03:18

## 2023-12-11 RX ADMIN — SODIUM CHLORIDE 92 ML: 9 INJECTION, SOLUTION INTRAVENOUS at 03:17

## 2023-12-11 RX ADMIN — FUROSEMIDE 20 MG: 20 TABLET ORAL at 08:08

## 2023-12-11 RX ADMIN — OXYCODONE HYDROCHLORIDE 5 MG: 5 TABLET ORAL at 02:02

## 2023-12-11 RX ADMIN — DULOXETINE HYDROCHLORIDE 20 MG: 20 CAPSULE, DELAYED RELEASE ORAL at 08:09

## 2023-12-11 RX ADMIN — METHYLPREDNISOLONE SODIUM SUCCINATE 62.5 MG: 125 INJECTION, POWDER, FOR SOLUTION INTRAMUSCULAR; INTRAVENOUS at 01:53

## 2023-12-11 RX ADMIN — SENNOSIDES 1 TABLET: 8.6 TABLET, FILM COATED ORAL at 21:55

## 2023-12-11 RX ADMIN — CYCLOBENZAPRINE HYDROCHLORIDE 5 MG: 5 TABLET, FILM COATED ORAL at 22:00

## 2023-12-11 RX ADMIN — METOPROLOL SUCCINATE 50 MG: 50 TABLET, EXTENDED RELEASE ORAL at 19:21

## 2023-12-11 RX ADMIN — TRAZODONE HYDROCHLORIDE 50 MG: 50 TABLET ORAL at 21:55

## 2023-12-11 RX ADMIN — GABAPENTIN 600 MG: 300 CAPSULE ORAL at 21:55

## 2023-12-11 RX ADMIN — INSULIN ASPART 1 UNITS: 100 INJECTION, SOLUTION INTRAVENOUS; SUBCUTANEOUS at 17:45

## 2023-12-11 RX ADMIN — OXYCODONE HYDROCHLORIDE 5 MG: 5 TABLET ORAL at 22:00

## 2023-12-11 RX ADMIN — INSULIN ASPART 1 UNITS: 100 INJECTION, SOLUTION INTRAVENOUS; SUBCUTANEOUS at 13:00

## 2023-12-11 RX ADMIN — CYCLOBENZAPRINE HYDROCHLORIDE 5 MG: 5 TABLET, FILM COATED ORAL at 02:18

## 2023-12-11 RX ADMIN — PANTOPRAZOLE SODIUM 40 MG: 40 TABLET, DELAYED RELEASE ORAL at 08:07

## 2023-12-11 RX ADMIN — SENNOSIDES 1 TABLET: 8.6 TABLET, FILM COATED ORAL at 08:08

## 2023-12-11 RX ADMIN — GABAPENTIN 600 MG: 300 CAPSULE ORAL at 08:09

## 2023-12-11 ASSESSMENT — ACTIVITIES OF DAILY LIVING (ADL)
ADLS_ACUITY_SCORE: 28
ADLS_ACUITY_SCORE: 29
ADLS_ACUITY_SCORE: 29
ADLS_ACUITY_SCORE: 28
ADLS_ACUITY_SCORE: 29
ADLS_ACUITY_SCORE: 29
ADLS_ACUITY_SCORE: 28

## 2023-12-11 NOTE — PLAN OF CARE
"Goal Outcome Evaluation:  Summary: SOB d/t COPD exacerbation.   DATE & TIME: 12/10 7923-7000  Cognitive Concerns/ Orientation : A&Ox4   BEHAVIOR & AGGRESSION TOOL COLOR: Green  ABNL VS/O2: VSS On RA, sats low 90s OK to be at 88%. Exp wheezing and dyspnea on exertion  MOBILITY:IND with cane - desats to 88 when ambulating, recovers quickly  PAIN MANAGMENT: Chronic low back pain, PRN Oxy 1x and flexeril 1x. Pt reports that 6/10 is his \"normal\".  DIET: Regular   BOWEL/BLADDER: Continent  ABNL LAB/BG:   DRAIN/DEVICES:  PIV IV SL  TELEMETRY RHYTHM: n/a  SKIN: Scattered bruising and scabs, redness to  Scab on L foot  TESTS/PROCEDURES: NA  D/C DAY/GOALS/PLACE: Pending clinical improvement, Still LS very wheezy and ACEVES  OTHER IMPORTANT INFO:Had CT overnight to assess mass and previous PE in lung.                      "

## 2023-12-11 NOTE — PLAN OF CARE
Goal Outcome Evaluation: reviewed with patient  DATE & TIME: 12/11/2023 days     Cognitive Concerns/ Orientation :  alert and oriented x 4   BEHAVIOR & AGGRESSION TOOL COLOR:  green  CIWA SCORE:  na    ABNL VS/O2: VSS RA uses 02 at times for comfort   MOBILITY:  up independently use a cane   PAIN MANAGMENT:  complains of pain in his back declines any meds at this time   DIET:  Regular ate well   BOWEL/BLADDER:  Continent of urine and no stools  this shift   ABNL LAB/BG:  BGM 97  156  DRAIN/DEVICES:  SL removed   TELEMETRY RHYTHM:  na   SKIN:  scattered bruises scattered scabs   TESTS/PROCEDURES:  went for CT of chest at 0300,  Went for CXR this am   D/C DATE:  uncertain   Discharge Barriers:    OTHER IMPORTANT INFO:  Pt up in the room independently, using his cane. Pt does become dyspneic on exertion, pt does place himself on 02 at times for comfort. Pt ate well. Pt complains of pain declines pain meds. States he will take it later. Lungs have scattered wheezes. Pt ambulated in the halls twice and tolerated, with some dyspnea.

## 2023-12-11 NOTE — PLAN OF CARE
Goal Outcome Evaluation:  Summary: SOB d/t COPD exacerbation.   DATE & TIME: 12/10 1478-5706  Cognitive Concerns/ Orientation : A&Ox4   BEHAVIOR & AGGRESSION TOOL COLOR: Green  ABNL VS/O2: VSS On RA, sats low 90s OK to be at 88%  MOBILITY:IND with cane - desats to 88 when ambulating, recovers quickly  PAIN MANAGMENT: Chronic low back pain, PRN Oxy given once.   DIET: Regular   BOWEL/BLADDER: Continent  ABNL LAB/BG: .  DRAIN/DEVICES:  PIV IV SL  TELEMETRY RHYTHM: n/a  SKIN: Scattered bruising and scabs, redness to  Scab on L foot  TESTS/PROCEDURES: NA  D/C DAY/GOALS/PLACE: Pending clinical improvement, Still LS very wheezy and ACEVES  OTHER IMPORTANT INFO: LS still very wheezy. Infrequent nonproductive cough.

## 2023-12-11 NOTE — PROGRESS NOTES
[facility]    Hospitalist Progress Note    Interval History   Stable overnight.  Patient tells me that he does not feel well this morning, still has pain and spasms in his back and he asks about getting more Flexeril as this seemed to help last night.  Still feels quite short of breath with minimal exertion.  Appreciated pulmonary evaluation yesterday.    -Data reviewed today: I reviewed all new labs and imaging results over the last 24 hours. I personally reviewed the chest x-ray image(s) showing faint residual opacities in the right upper lobe corresponding to the previous mass and posttreatment changes.  No other focal opacities. .    Physical Exam   Temp: 97.7  F (36.5  C) Temp src: Oral BP: (!) 165/89 Pulse: 54   Resp: 18 SpO2: 95 % O2 Device: Nasal cannula Oxygen Delivery: 1 LPM  Vitals:    12/06/23 1752 12/06/23 2200   Weight: 79.4 kg (175 lb) 82.1 kg (181 lb)     Vital Signs with Ranges  Temp:  [97.7  F (36.5  C)-98.5  F (36.9  C)] 97.7  F (36.5  C)  Pulse:  [54-64] 54  Resp:  [18] 18  BP: (156-165)/(78-89) 165/89  SpO2:  [92 %-95 %] 95 %  I/O last 3 completed shifts:  In: 1680 [P.O.:1680]  Out: -     Physical Exam  General: Alert, cooperative, dyspneic with minimal activity, reclined in bed  Respiratory: Breathing appeared less labored,, poor air movement bilaterally, less wheezing today  Cardiac: RRR, no edema  Abdomen: Soft, nontender, nondistended  MSK: Moving all extremities  Neuro: Alert, oriented, no facial asymmetry, fluent speech  Psych: Appears a bit anxious, breathing appeared less labored    Medications      amLODIPine  5 mg Oral At Bedtime    aspirin  81 mg Oral BID    [START ON 12/12/2023] azithromycin  500 mg Oral Every Other Day    DULoxetine  20 mg Oral BID    Fluticasone-Umeclidin-Vilant  1 puff Inhalation Daily    furosemide  20 mg Oral Daily    gabapentin  600 mg Oral BID    insulin aspart  1-7 Units Subcutaneous TID AC    insulin aspart  1-5 Units Subcutaneous  At Bedtime    levothyroxine  125 mcg Oral Daily    metoprolol succinate ER  50 mg Oral QPM    pantoprazole  40 mg Oral QAM AC    polyethylene glycol  17 g Oral Daily    predniSONE  60 mg Oral Daily    rosuvastatin  10 mg Oral QPM    sennosides  1-2 tablet Oral BID    sodium chloride (PF)  3 mL Intracatheter Q8H    traZODone  50 mg Oral At Bedtime       Data   Recent Labs   Lab 12/11/23  1215 12/11/23  0806 12/11/23  0200 12/08/23  1321 12/08/23  1158 12/07/23  1652 12/06/23  1801   WBC  --   --   --   --  13.6*  --  8.7   HGB  --   --   --   --  13.7  --  15.4   MCV  --   --   --   --  94  --  92   PLT  --   --   --   --  150  --  212   NA  --   --   --   --  141  --  139   POTASSIUM  --   --   --   --  4.5  --  4.3   CHLORIDE  --   --   --   --  106  --  102   CO2  --   --   --   --  26  --  22   BUN  --   --   --   --  25.0*  --  17.6   CR  --   --   --   --  0.89  --  0.87   ANIONGAP  --   --   --   --  9  --  15   KARLIE  --   --   --   --  8.8  --  9.5   * 97 184*   < > 172*   < > 213*   ALBUMIN  --   --   --   --  3.4*  --  3.9   PROTTOTAL  --   --   --   --  6.4  --  7.3   BILITOTAL  --   --   --   --  0.4  --  0.3   ALKPHOS  --   --   --   --  155*  --  179*   ALT  --   --   --   --  19  --  24   AST  --   --   --   --  11  --  21    < > = values in this interval not displayed.       Recent Results (from the past 24 hour(s))   CT Chest Pulmonary Embolism w Contrast    Narrative    EXAM: CT CHEST PULMONARY EMBOLISM W CONTRAST  LOCATION: [facility]  DATE: 12/11/2023    INDICATION: Abrupt worsening of dyspnea 3 d PTA and history of RUL lung cancer post radiation.   COMPARISON: 10/26/2023.  TECHNIQUE: CT chest pulmonary angiogram during arterial phase injection of IV contrast. Multiplanar reformats and MIP reconstructions were performed. Dose reduction techniques were used.   CONTRAST: 68 mL Isovue 370    FINDINGS:  ANGIOGRAM CHEST: Pulmonary arteries are normal caliber and negative  for pulmonary emboli. Thoracic aorta is negative for dissection. No CT evidence of right heart strain.    LUNGS AND PLEURA: Prominent emphysematous changes in the lungs. Similar-appearing mostly linear area of consolidation and volume loss in the right upper lobe just superior and lateral to the right hilum compatible with posttreatment related changes.   Nothing definite for acute pneumonitis. Multiple scattered small bilateral pulmonary nodules measuring up to 5 mm are unchanged but remain indeterminate. Mild generalized bronchial wall thickening more so in the lower lungs suggests bronchiolitis. No   pleural effusions.    MEDIASTINUM/AXILLAE: Normal.    CORONARY ARTERY CALCIFICATION: Severe.    UPPER ABDOMEN: Stable postoperative changes partial right hepatectomy. Cholelithiasis. Benign-appearing right renal cyst.    MUSCULOSKELETAL: Mild to moderate degenerative changes in the spine and stable mild multilevel vertebral body compression fractures in the midthoracic spine.      Impression    IMPRESSION:  1.  Negative for pulmonary embolism.    2.  Stable area of linear consolidation and volume loss in the right upper lobe compatible with posttreatment related changes.    3.  Marked emphysematous changes in the lungs. Nothing definite for acute pneumonitis. However, there is some generalized bronchial wall thickening more so in the lower lungs suggesting bronchiolitis.    4.  Scattered small bilateral pulmonary nodules measuring up to 5 mm are unchanged. Continued surveillance recommended.    5.  Severe coronary artery calcification.    6.  Cholelithiasis.   XR Chest/Heart Fluoro    Narrative    CHEST/HEART FLUORO 12/11/2023 9:29 AM     HISTORY: Right upper lobe lung cancer post radiation with severe  dyspnea.    COMPARISON: None.    FLUOROSCOPY TIME: 0.1 minutes.    SPOT FILMS: 1    FINDINGS: The hemidiaphragms move downward in a symmetric fashion when  the patient sniffs.      Impression    IMPRESSION: Negative  sniff test.    RANDELL LOTT MD         SYSTEM ID:  [id number]           Assessment & Plan      Harrison Thomas is a 78 year old male with past medical history significant for chronic hypoxic respiratory failure [has oxygen as needed at home], COPD, RUL lung mass s/p radiation, untreated BRENNEN, HTN, HLD, CAD, hypothyroidism admitted on 12/6/2023 with acute on chronic hypoxic respiratory failure likely 2/2 COPD exacerbation.      COPD exacerbation  Acute on Chronic hypoxemic respiratory failure   Lung mass s/p radiation treatment and probable radiation pneumonitis   BRENNEN   Pt presents to the ED with increased shortness of breath over the past week. He uses O2 at home as needed, but has using it much more recently. He has been trying nebs at home and did get a prescription for prednisone yesterday.  * Clinical picture is consistent with COPD exacerbation. He has poor air movement on pulmonary auscultation and diffuse expiratory wheezing. BNP and procalcitonin are within normal limits. COVID, influenza and RSV panel is negative. CXR showed just faint residual opacities in the RUL corresponding to RUL mass and post-treatment changes. No other abnormalities.   * He was initially placed on BiPAP in the ED for work of breathing, but has since been weaned off. He was also treated with duonebs, mag and methylprednisolone.    - Admit to inpatient  - Continuous oximetry and supplemental O2 as needed   -Currently on IV Solu-Medrol 62.5 mg every 8 hours.  Can taper down to every 12 hours pending improvement  -Continue home trelegy Ellipta inhaler  -Currently on 1 L nasal cannula intermittently.  Uses home oxygen 2 L at night.  - Started treatment dose azithromycin (pt s on every other day dosing for prophylaxis).  Completed 5 days treatment dose, now back on QOD prophylactic dose.  -Will need home oxygen evaluation prior to discharge  -Follows with Dr. Handy with pulmonology at [facility], last seen in September 2023.  Family  apparently asking about home BiPAP.  Placed consult to pulmonology for recommendations regarding possible BiPAP at home, further COPD management given patient still significantly symptomatic from advanced COPD.  Appreciate pulmonology evaluation.  CT chest done that was negative for PE, overall stable from last CT-no new significant findings.  Also underwent sniff test today that was negative.  Has been transitioned to p.o. prednisone 60 Mg daily with plans to taper down by 10 Mg every 4 days.  Of note he was on low-dose chronic prednisone PTA.  [IV went bad today and hence transitioned to p.o. prednisone].  Pulmonologist indicated that they will come back and evaluate patient later in the week.  Patient not yet stable to discharge given significant symptoms with minimal activity, hopefully will be able to discharge home in the next day or 2 pending further input from pulmonology.  Regarding possible home BiPAP-this will likely need to be accomplished as outpatient.  -Discussed with CC who will talk with patient regarding portable home oxygen unit.     Hypertension   Hyperlipidemia  Coronary artery disease   - Continue PTA amlodipine   - COntinue PTA ASA and rosuvastatin   - COntinue PTA metoprolol and amlodpine   - COntinue PTA lasix      Chronic low back pain   Constipation  - Continue PTA oxycodone, gabapentin and naproxen   - Pt is supposed to be getting lumbar MRI soon.  Can be done as an outpatient.  -Hold naproxen for now in the setting of high-dose steroids since he would be at high risk for upper GI bleed and ulcers.  -oral PPI for prophylaxis  -Started on bowel regimen as patient is constipated  -Increase Flexeril to 3 times daily as needed.     Hx of papillary thyroid cancer s/p thyroidectomy   Hypothyroidism   - Continue PTA levothyroxine      Insomnia   - Continue TPA Trazodone      Diet:Regular diet   DVT Prophylaxis: Pneumatic Compression Devices  Mir Catheter: Not present  Lines: None     Cardiac  "Monitoring: None  Code Status: DNR/DNI               Clinically Significant Risk Factors              # Hypoalbuminemia: Lowest albumin = 3.4 g/dL at 12/8/2023 11:58 AM, will monitor as appropriate     # Hypertension: Noted on problem list        # Overweight: Estimated body mass index is 25.97 kg/m  as calculated from the following:    Height as of this encounter: 1.778 m (5' 10\").    Weight as of this encounter: 82.1 kg (181 lb).             Ashley Langley MD  "

## 2023-12-11 NOTE — PROGRESS NOTES
Brief pulmonary note:    79 yo male with PMH RUL NSCLC s/p XRT, COPD, BRENNEN, who presented with hypoxemia and SOB.    CT chest show RUL NSCLC appropriate response to radiation, emphysema. Sniff test with bilateral diaphragm excursion. Hypoxemia significantly improved. Thus continue to treat as COPD exacerbation and reassess daily for appropriateness of discharge.    -- Prednisone taper: 40mg x3 days, 30mg x3 days, 20mg x3 days, 10mg x3 days, 5mg x3 days  -- Duonebs PRN  -- continue Trelegy, azithromycin on discharge  -- follow up with [facility] pulmonary      Shiv Jean  Interventional Pulmonology  [facility]

## 2023-12-11 NOTE — PLAN OF CARE
Goal Outcome Evaluation:    A/Ox4. VSS, sating at 89 to 93% on RA at rest. Still has dyspnea/SOB with exertion and intermittently uses 1L NC if really SOB. Pain is managed by PRN Oxycodone. Good appetite. Voiding well per BR. Good appetite. Non productive cough occasionally. Has scattered bruising and scab. Pulmonology was consulted and seen pt this evening. Pending plan discharge to home once medically stable.

## 2023-12-12 ENCOUNTER — APPOINTMENT (OUTPATIENT)
Dept: PHYSICAL THERAPY | Facility: CLINIC | Age: 78
DRG: 190 | End: 2023-12-12
Payer: MEDICARE

## 2023-12-12 LAB
GLUCOSE BLDC GLUCOMTR-MCNC: 119 MG/DL (ref 70–99)
GLUCOSE BLDC GLUCOMTR-MCNC: 161 MG/DL (ref 70–99)
GLUCOSE BLDC GLUCOMTR-MCNC: 213 MG/DL (ref 70–99)
GLUCOSE BLDC GLUCOMTR-MCNC: 83 MG/DL (ref 70–99)
GLUCOSE BLDC GLUCOMTR-MCNC: 87 MG/DL (ref 70–99)

## 2023-12-12 PROCEDURE — 250N000012 HC RX MED GY IP 250 OP 636 PS 637: Performed by: HOSPITALIST

## 2023-12-12 PROCEDURE — 99232 SBSQ HOSP IP/OBS MODERATE 35: CPT | Performed by: INTERNAL MEDICINE

## 2023-12-12 PROCEDURE — 250N000013 HC RX MED GY IP 250 OP 250 PS 637: Performed by: INTERNAL MEDICINE

## 2023-12-12 PROCEDURE — 250N000013 HC RX MED GY IP 250 OP 250 PS 637: Performed by: STUDENT IN AN ORGANIZED HEALTH CARE EDUCATION/TRAINING PROGRAM

## 2023-12-12 PROCEDURE — 97112 NEUROMUSCULAR REEDUCATION: CPT | Mod: GP

## 2023-12-12 PROCEDURE — 120N000001 HC R&B MED SURG/OB

## 2023-12-12 PROCEDURE — 97116 GAIT TRAINING THERAPY: CPT | Mod: GP

## 2023-12-12 PROCEDURE — 250N000013 HC RX MED GY IP 250 OP 250 PS 637: Performed by: HOSPITALIST

## 2023-12-12 RX ORDER — ACETAMINOPHEN 500 MG
1000 TABLET ORAL EVERY 8 HOURS
Status: DISCONTINUED | OUTPATIENT
Start: 2023-12-12 | End: 2024-01-10 | Stop reason: HOSPADM

## 2023-12-12 RX ORDER — CYCLOBENZAPRINE HCL 10 MG
10 TABLET ORAL EVERY 8 HOURS
Status: DISCONTINUED | OUTPATIENT
Start: 2023-12-12 | End: 2023-12-13

## 2023-12-12 RX ORDER — LIDOCAINE 4 G/G
2 PATCH TOPICAL
Status: DISCONTINUED | OUTPATIENT
Start: 2023-12-12 | End: 2024-01-10 | Stop reason: HOSPADM

## 2023-12-12 RX ADMIN — GABAPENTIN 400 MG: 100 CAPSULE ORAL at 15:47

## 2023-12-12 RX ADMIN — LIDOCAINE 2 PATCH: 4 PATCH TOPICAL at 12:27

## 2023-12-12 RX ADMIN — ASPIRIN 81 MG: 81 TABLET, COATED ORAL at 20:34

## 2023-12-12 RX ADMIN — AZITHROMYCIN DIHYDRATE 500 MG: 250 TABLET ORAL at 08:35

## 2023-12-12 RX ADMIN — GABAPENTIN 400 MG: 100 CAPSULE ORAL at 21:42

## 2023-12-12 RX ADMIN — ROSUVASTATIN CALCIUM 10 MG: 10 TABLET, FILM COATED ORAL at 20:34

## 2023-12-12 RX ADMIN — PANTOPRAZOLE SODIUM 40 MG: 40 TABLET, DELAYED RELEASE ORAL at 05:47

## 2023-12-12 RX ADMIN — SENNOSIDES 1 TABLET: 8.6 TABLET, FILM COATED ORAL at 20:34

## 2023-12-12 RX ADMIN — ACETAMINOPHEN 1000 MG: 500 TABLET, FILM COATED ORAL at 21:42

## 2023-12-12 RX ADMIN — LEVOTHYROXINE SODIUM 125 MCG: 125 TABLET ORAL at 08:35

## 2023-12-12 RX ADMIN — SENNOSIDES 1 TABLET: 8.6 TABLET, FILM COATED ORAL at 08:34

## 2023-12-12 RX ADMIN — GABAPENTIN 600 MG: 300 CAPSULE ORAL at 08:35

## 2023-12-12 RX ADMIN — ACETAMINOPHEN 1000 MG: 500 TABLET, FILM COATED ORAL at 11:30

## 2023-12-12 RX ADMIN — CYCLOBENZAPRINE 10 MG: 10 TABLET, FILM COATED ORAL at 15:47

## 2023-12-12 RX ADMIN — OXYCODONE HYDROCHLORIDE 5 MG: 5 TABLET ORAL at 07:31

## 2023-12-12 RX ADMIN — AMLODIPINE BESYLATE 5 MG: 5 TABLET ORAL at 21:42

## 2023-12-12 RX ADMIN — CYCLOBENZAPRINE HYDROCHLORIDE 5 MG: 5 TABLET, FILM COATED ORAL at 07:31

## 2023-12-12 RX ADMIN — PREDNISONE 60 MG: 20 TABLET ORAL at 08:34

## 2023-12-12 RX ADMIN — INSULIN ASPART 1 UNITS: 100 INJECTION, SOLUTION INTRAVENOUS; SUBCUTANEOUS at 16:39

## 2023-12-12 RX ADMIN — DULOXETINE HYDROCHLORIDE 20 MG: 20 CAPSULE, DELAYED RELEASE ORAL at 08:34

## 2023-12-12 RX ADMIN — METOPROLOL SUCCINATE 50 MG: 50 TABLET, EXTENDED RELEASE ORAL at 20:34

## 2023-12-12 RX ADMIN — FUROSEMIDE 20 MG: 20 TABLET ORAL at 08:34

## 2023-12-12 RX ADMIN — ASPIRIN 81 MG: 81 TABLET, COATED ORAL at 08:34

## 2023-12-12 RX ADMIN — DULOXETINE HYDROCHLORIDE 20 MG: 20 CAPSULE, DELAYED RELEASE ORAL at 20:34

## 2023-12-12 RX ADMIN — TRAZODONE HYDROCHLORIDE 50 MG: 50 TABLET ORAL at 21:42

## 2023-12-12 ASSESSMENT — ACTIVITIES OF DAILY LIVING (ADL)
ADLS_ACUITY_SCORE: 28
ADLS_ACUITY_SCORE: 28
ADLS_ACUITY_SCORE: 29
ADLS_ACUITY_SCORE: 28
ADLS_ACUITY_SCORE: 29
ADLS_ACUITY_SCORE: 28
ADLS_ACUITY_SCORE: 28

## 2023-12-12 NOTE — PLAN OF CARE
Goal Outcome Evaluation:       Summary: SOB d/t COPD exacerbation.   DATE & TIME: 12/11/23, pm shift  Cognitive Concerns/ Orientation : A&Ox4   BEHAVIOR & AGGRESSION TOOL COLOR: Green  ABNL VS/O2: VSS. On 1L of O2. ( Baseline 1L O2 use at home per patient report). Exp wheezing and dyspnea on exertion  MOBILITY:IND with cane.  PAIN MANAGMENT: Gave Flexeril and Oxycodone for back pain.  DIET: Regular   BOWEL/BLADDER: Continent  ABNL LAB/BG: BG 87  DRAIN/DEVICES:  none  TELEMETRY RHYTHM: n/a  SKIN: Scattered bruising and scabs.  TESTS/PROCEDURES: NA  D/C DAY/GOALS/PLACE: Pending clinical improvement. PT recommending discharge home with home care. SW following.  OTHER IMPORTANT INFO: pulmonology following.

## 2023-12-12 NOTE — PLAN OF CARE
Goal Outcome Evaluation:                      Summary: SOB d/t COPD exacerbation.   DATE & TIME: 12/11/23, pm shift  Cognitive Concerns/ Orientation : A&Ox4   BEHAVIOR & AGGRESSION TOOL COLOR: Green  ABNL VS/O2: VSS. On 1L of O2. ( Baseline 1L O2 use at home per patient report). Exp wheezing and dyspnea on exertion  MOBILITY:IND with cane.  PAIN MANAGMENT: Gave Flexeril and Oxycodone for back pain.  DIET: Regular   BOWEL/BLADDER: Continent  ABNL LAB/BG:  and 217  DRAIN/DEVICES:  none  TELEMETRY RHYTHM: n/a  SKIN: Scattered bruising and scabs.  TESTS/PROCEDURES: NA  D/C DAY/GOALS/PLACE: Pending clinical improvement. PT recommending discharge home with home care. SW following.  OTHER IMPORTANT INFO: pulmonology following.

## 2023-12-12 NOTE — PLAN OF CARE
Goal Outcome Evaluation: reviewed with patient   DATE & TIME: 12/12/2023 days     Cognitive Concerns/ Orientation :  alert and oriented x 4   BEHAVIOR & AGGRESSION TOOL COLOR:  green   CIWA SCORE:  na    ABNL VS/O2:  VSS RA puts 02 on for comfort   MOBILITY:  up independently   PAIN MANAGMENT:  medicated with pain meds twice and lidocaine patches applied not much relief from back pain   DIET: Regular  ate fair for breakfast and well for lunch   BOWEL/BLADDER:  continent of urine and no stools this shift   ABNL LAB/BG:  BGM 83 119  DRAIN/DEVICES:   none   TELEMETRY RHYTHM:  na   SKIN:  scattered bruises and scabbed areas   TESTS/PROCEDURES:  none   D/C DATE:  uncertain   Discharge Barriers:    OTHER IMPORTANT INFO:  pt was up in his room independently and tolerated well, some dyspnea noted with activity. Pt uses a cane for walking . Pt states the pain meds don't really help lidocaine patches applied. Pt ate well for lunch. Ambulated in the halls twice and tolerated well. Some shortness of breath noted with walking.

## 2023-12-12 NOTE — PROGRESS NOTES
[facility]    Hospitalist Progress Note    Interval History   Stable overnight.  Per the patient breathing feels mostly the same.  Back pain also remains stable.  No other acute issues  -Data reviewed today: I reviewed all new labs and imaging results over the last 24 hours. I personally reviewed the chest x-ray image(s) showing faint residual opacities in the right upper lobe corresponding to the previous mass and posttreatment changes.  No other focal opacities. .    Physical Exam   Temp: 98.7  F (37.1  C) Temp src: Oral BP: (!) 157/87 Pulse: 67   Resp: 18 SpO2: 94 % O2 Device: None (Room air) Oxygen Delivery: 1 LPM  Vitals:    12/06/23 1752 12/06/23 2200   Weight: 79.4 kg (175 lb) 82.1 kg (181 lb)     Vital Signs with Ranges  Temp:  [97.9  F (36.6  C)-98.7  F (37.1  C)] 98.7  F (37.1  C)  Pulse:  [50-74] 67  Resp:  [16-18] 18  BP: (144-160)/(82-96) 157/87  SpO2:  [90 %-95 %] 94 %  I/O last 3 completed shifts:  In: 1040 [P.O.:1040]  Out: -     Physical Exam    General: Alert, cooperative, dyspneic with minimal activity, reclined in bed  Respiratory: Breathing appeared less labored, better air entry into the lungs today.  No wheezing heard on auscultation today   cardiac: RRR, no edema  Abdomen: Soft, nontender, nondistended  MSK: Moving all extremities  Neuro: Alert, oriented, no facial asymmetry, fluent speech  Psych: Appears a bit anxious, breathing appeared less labored    Medications      acetaminophen  1,000 mg Oral Q8H    amLODIPine  5 mg Oral At Bedtime    aspirin  81 mg Oral BID    azithromycin  500 mg Oral Every Other Day    cyclobenzaprine  10 mg Oral Q8H    DULoxetine  20 mg Oral BID    Fluticasone-Umeclidin-Vilant  1 puff Inhalation Daily    furosemide  20 mg Oral Daily    gabapentin  600 mg Oral BID    insulin aspart  1-7 Units Subcutaneous TID AC    insulin aspart  1-5 Units Subcutaneous At Bedtime    levothyroxine  125 mcg Oral Daily    lidocaine  2 patch Transdermal Q24H     metoprolol succinate ER  50 mg Oral QPM    pantoprazole  40 mg Oral QAM AC    polyethylene glycol  17 g Oral Daily    predniSONE  60 mg Oral Daily    rosuvastatin  10 mg Oral QPM    sennosides  1-2 tablet Oral BID    sodium chloride (PF)  3 mL Intracatheter Q8H    traZODone  50 mg Oral At Bedtime       Data   Recent Labs   Lab 12/12/23  1140 12/12/23  0817 12/12/23  0236 12/08/23  1321 12/08/23  1158 12/07/23  1652 12/06/23  1801   WBC  --   --   --   --  13.6*  --  8.7   HGB  --   --   --   --  13.7  --  15.4   MCV  --   --   --   --  94  --  92   PLT  --   --   --   --  150  --  212   NA  --   --   --   --  141  --  139   POTASSIUM  --   --   --   --  4.5  --  4.3   CHLORIDE  --   --   --   --  106  --  102   CO2  --   --   --   --  26  --  22   BUN  --   --   --   --  25.0*  --  17.6   CR  --   --   --   --  0.89  --  0.87   ANIONGAP  --   --   --   --  9  --  15   KARLIE  --   --   --   --  8.8  --  9.5   * 83 87   < > 172*   < > 213*   ALBUMIN  --   --   --   --  3.4*  --  3.9   PROTTOTAL  --   --   --   --  6.4  --  7.3   BILITOTAL  --   --   --   --  0.4  --  0.3   ALKPHOS  --   --   --   --  155*  --  179*   ALT  --   --   --   --  19  --  24   AST  --   --   --   --  11  --  21    < > = values in this interval not displayed.       No results found for this or any previous visit (from the past 24 hour(s)).          Assessment & Plan      Harrison Thomas is a 78 year old male with past medical history significant for chronic hypoxic respiratory failure [has oxygen as needed at home], COPD, RUL lung mass s/p radiation, untreated BRENNEN, HTN, HLD, CAD, hypothyroidism admitted on 12/6/2023 with acute on chronic hypoxic respiratory failure likely 2/2 COPD exacerbation.      COPD exacerbation  Acute on Chronic hypoxemic respiratory failure   Lung mass s/p radiation treatment and probable radiation pneumonitis   BRENNEN   Pt presents to the ED with increased shortness of breath over the past week. He uses O2 at  home as needed, but has using it much more recently. He has been trying nebs at home and did get a prescription for prednisone yesterday.  * Clinical picture is consistent with COPD exacerbation. He has poor air movement on pulmonary auscultation and diffuse expiratory wheezing. BNP and procalcitonin are within normal limits. COVID, influenza and RSV panel is negative. CXR showed just faint residual opacities in the RUL corresponding to RUL mass and post-treatment changes. No other abnormalities.   * He was initially placed on BiPAP in the ED for work of breathing, but has since been weaned off. He was also treated with duonebs, mag and methylprednisolone.    - Admit to inpatient  - Continuous oximetry and supplemental O2 as needed   -Currently on IV Solu-Medrol 62.5 mg every 8 hours.  Can taper down to every 12 hours pending improvement  -Continue home trelegy Ellipta inhaler  -Currently on 1 L nasal cannula intermittently.  Uses home oxygen 2 L at night.  - Started treatment dose azithromycin (pt s on every other day dosing for prophylaxis).  Completed 5 days treatment dose, now back on QOD prophylactic dose.  -Will need home oxygen evaluation prior to discharge  -Follows with Dr. Handy with pulmonology at [facility], last seen in September 2023.  Family apparently asking about home BiPAP.  Placed consult to pulmonology for recommendations regarding possible BiPAP at home, further COPD management given patient still significantly symptomatic from advanced COPD.  Appreciate pulmonology evaluation.  CT chest done that was negative for PE, overall stable from last CT-no new significant findings.  Also underwent sniff test today that was negative.  Has been transitioned to p.o. prednisone 60 Mg daily with plans to taper down by 10 Mg every 4 days.  Of note he was on low-dose chronic prednisone PTA.  [IV went bad today and hence transitioned to p.o. prednisone].  Pulmonologist indicated that they will come back and evaluate  patient later in the week.  Patient not yet stable to discharge given significant symptoms with minimal activity, hopefully will be able to discharge home in the next day or 2 pending further input from pulmonology.  Regarding possible home BiPAP-this will likely need to be accomplished as outpatient.  -Discussed with CC who will talk with patient regarding portable home oxygen unit.    CT chest done on 12/11/2022 showed bronchial wall thickening with bronchiolitis, marked emphysematous changes and a postradiation treatment related changes seen in the right upper lobe, severe coronary artery calcification otherwise negative, cholelithiasis seen     Hypertension   Hyperlipidemia  Coronary artery disease   - Continue PTA amlodipine   - COntinue PTA ASA and rosuvastatin   - COntinue PTA metoprolol and amlodpine   - COntinue PTA lasix      Chronic low back pain   Constipation  - Continue PTA oxycodone, gabapentin   - Pt is supposed to be getting lumbar MRI soon.  Can be done as an outpatient.  -Hold naproxen for now in the setting of high-dose steroids since he would be at high risk for upper GI bleed and ulcers.  -oral PPI for prophylaxis  -Started on bowel regimen as patient is constipated  -Increase Flexeril to 10 mg 3 times daily PRN   Scheduled Tylenol  Added lidocaine patches for better pain control  Gabapentin doses switched from 600 mg twice daily to 400 mg TID for more even distribution of the dosage during the daytime     Hx of papillary thyroid cancer s/p thyroidectomy   Hypothyroidism   - Continue PTA levothyroxine      Insomnia   - Continue TPA Trazodone      Diet:Regular diet   DVT Prophylaxis: Pneumatic Compression Devices  Mir Catheter: Not present  Lines: None     Cardiac Monitoring: None  Code Status: DNR/DNI               Clinically Significant Risk Factors              # Hypoalbuminemia: Lowest albumin = 3.4 g/dL at 12/8/2023 11:58 AM, will monitor as appropriate     # Hypertension: Noted on  "problem list        # Overweight: Estimated body mass index is 25.97 kg/m  as calculated from the following:    Height as of this encounter: 1.778 m (5' 10\").    Weight as of this encounter: 82.1 kg (181 lb).             Aaliyah Valdivia MD  "

## 2023-12-13 ENCOUNTER — APPOINTMENT (OUTPATIENT)
Dept: PHYSICAL THERAPY | Facility: CLINIC | Age: 78
DRG: 190 | End: 2023-12-13
Payer: MEDICARE

## 2023-12-13 LAB
GLUCOSE BLDC GLUCOMTR-MCNC: 134 MG/DL (ref 70–99)
GLUCOSE BLDC GLUCOMTR-MCNC: 152 MG/DL (ref 70–99)
GLUCOSE BLDC GLUCOMTR-MCNC: 166 MG/DL (ref 70–99)
GLUCOSE BLDC GLUCOMTR-MCNC: 194 MG/DL (ref 70–99)
GLUCOSE BLDC GLUCOMTR-MCNC: 74 MG/DL (ref 70–99)
GLUCOSE BLDC GLUCOMTR-MCNC: 89 MG/DL (ref 70–99)

## 2023-12-13 PROCEDURE — 250N000012 HC RX MED GY IP 250 OP 636 PS 637: Performed by: HOSPITALIST

## 2023-12-13 PROCEDURE — 97530 THERAPEUTIC ACTIVITIES: CPT | Mod: GP | Performed by: PHYSICAL THERAPIST

## 2023-12-13 PROCEDURE — 97112 NEUROMUSCULAR REEDUCATION: CPT | Mod: GP | Performed by: PHYSICAL THERAPIST

## 2023-12-13 PROCEDURE — 99232 SBSQ HOSP IP/OBS MODERATE 35: CPT | Performed by: INTERNAL MEDICINE

## 2023-12-13 PROCEDURE — 120N000001 HC R&B MED SURG/OB

## 2023-12-13 PROCEDURE — 250N000013 HC RX MED GY IP 250 OP 250 PS 637: Performed by: HOSPITALIST

## 2023-12-13 PROCEDURE — 250N000013 HC RX MED GY IP 250 OP 250 PS 637: Performed by: STUDENT IN AN ORGANIZED HEALTH CARE EDUCATION/TRAINING PROGRAM

## 2023-12-13 PROCEDURE — 250N000013 HC RX MED GY IP 250 OP 250 PS 637: Performed by: INTERNAL MEDICINE

## 2023-12-13 RX ORDER — CYCLOBENZAPRINE HCL 10 MG
10 TABLET ORAL EVERY 8 HOURS PRN
Status: DISCONTINUED | OUTPATIENT
Start: 2023-12-13 | End: 2023-12-26

## 2023-12-13 RX ADMIN — SENNOSIDES 1 TABLET: 8.6 TABLET, FILM COATED ORAL at 20:47

## 2023-12-13 RX ADMIN — CYCLOBENZAPRINE 10 MG: 10 TABLET, FILM COATED ORAL at 08:48

## 2023-12-13 RX ADMIN — POLYETHYLENE GLYCOL 3350 17 G: 17 POWDER, FOR SOLUTION ORAL at 08:48

## 2023-12-13 RX ADMIN — METOPROLOL SUCCINATE 50 MG: 50 TABLET, EXTENDED RELEASE ORAL at 20:47

## 2023-12-13 RX ADMIN — LEVOTHYROXINE SODIUM 125 MCG: 125 TABLET ORAL at 08:48

## 2023-12-13 RX ADMIN — CYCLOBENZAPRINE 10 MG: 10 TABLET, FILM COATED ORAL at 00:31

## 2023-12-13 RX ADMIN — GABAPENTIN 400 MG: 100 CAPSULE ORAL at 22:46

## 2023-12-13 RX ADMIN — INSULIN ASPART 2 UNITS: 100 INJECTION, SOLUTION INTRAVENOUS; SUBCUTANEOUS at 18:31

## 2023-12-13 RX ADMIN — PREDNISONE 60 MG: 20 TABLET ORAL at 08:48

## 2023-12-13 RX ADMIN — LIDOCAINE 2 PATCH: 4 PATCH TOPICAL at 08:48

## 2023-12-13 RX ADMIN — CYCLOBENZAPRINE 10 MG: 10 TABLET, FILM COATED ORAL at 22:46

## 2023-12-13 RX ADMIN — GABAPENTIN 400 MG: 100 CAPSULE ORAL at 08:48

## 2023-12-13 RX ADMIN — ACETAMINOPHEN 1000 MG: 500 TABLET, FILM COATED ORAL at 22:46

## 2023-12-13 RX ADMIN — TRAZODONE HYDROCHLORIDE 50 MG: 50 TABLET ORAL at 22:46

## 2023-12-13 RX ADMIN — DULOXETINE HYDROCHLORIDE 20 MG: 20 CAPSULE, DELAYED RELEASE ORAL at 08:48

## 2023-12-13 RX ADMIN — ASPIRIN 81 MG: 81 TABLET, COATED ORAL at 20:47

## 2023-12-13 RX ADMIN — ACETAMINOPHEN 1000 MG: 500 TABLET, FILM COATED ORAL at 05:43

## 2023-12-13 RX ADMIN — SENNOSIDES 1 TABLET: 8.6 TABLET, FILM COATED ORAL at 08:48

## 2023-12-13 RX ADMIN — FUROSEMIDE 20 MG: 20 TABLET ORAL at 08:48

## 2023-12-13 RX ADMIN — GABAPENTIN 400 MG: 100 CAPSULE ORAL at 16:09

## 2023-12-13 RX ADMIN — DULOXETINE HYDROCHLORIDE 20 MG: 20 CAPSULE, DELAYED RELEASE ORAL at 20:47

## 2023-12-13 RX ADMIN — ACETAMINOPHEN 1000 MG: 500 TABLET, FILM COATED ORAL at 14:45

## 2023-12-13 RX ADMIN — AMLODIPINE BESYLATE 5 MG: 5 TABLET ORAL at 22:46

## 2023-12-13 RX ADMIN — OXYCODONE HYDROCHLORIDE 5 MG: 5 TABLET ORAL at 22:46

## 2023-12-13 RX ADMIN — OXYCODONE HYDROCHLORIDE 5 MG: 5 TABLET ORAL at 00:37

## 2023-12-13 RX ADMIN — OXYCODONE HYDROCHLORIDE 5 MG: 5 TABLET ORAL at 08:48

## 2023-12-13 RX ADMIN — ASPIRIN 81 MG: 81 TABLET, COATED ORAL at 08:48

## 2023-12-13 RX ADMIN — ROSUVASTATIN CALCIUM 10 MG: 10 TABLET, FILM COATED ORAL at 20:47

## 2023-12-13 RX ADMIN — PANTOPRAZOLE SODIUM 40 MG: 40 TABLET, DELAYED RELEASE ORAL at 05:43

## 2023-12-13 ASSESSMENT — ACTIVITIES OF DAILY LIVING (ADL)
ADLS_ACUITY_SCORE: 28

## 2023-12-13 NOTE — PLAN OF CARE
Goal Outcome Evaluation:    Summary: SOB d/t COPD exacerbation.   DATE & TIME: 12/12/23, pm shift  Cognitive Concerns/ Orientation : A&Ox4   BEHAVIOR & AGGRESSION TOOL COLOR: Green  ABNL VS/O2: VSS on RA ( Baseline 1L O2 use at home per patient report). Exp wheezing - dyspnea on exertion  MOBILITY:IND with cane.  PAIN MANAGMENT: scheduled Flexeril for back pain  DIET: Regular   BOWEL/BLADDER: Continent  ABNL LAB/BG: BG   DRAIN/DEVICES:  none  TELEMETRY RHYTHM: n/a  SKIN: Scattered bruising and scabs.  TESTS/PROCEDURES: NA  D/C DAY/GOALS/PLACE: Pending clinical improvement. PT recommending discharge home with home care. SW following.  OTHER IMPORTANT INFO: pulmonology following.

## 2023-12-13 NOTE — PLAN OF CARE
Goal Outcome Evaluation:  Summary: SOB d/t COPD exacerbation.   DATE & TIME: 12/12/23, [id number]  Cognitive Concerns/ Orientation : A&Ox4   BEHAVIOR & AGGRESSION TOOL COLOR: Green  ABNL VS/O2: VSS on RA. LS with expiratory wheezes   MOBILITY:IND with cane.  PAIN MANAGMENT: scheduled Flexeril and PRN Oxycodone for lumbar back pain. Lidocaine patch removed  DIET: Regular   BOWEL/BLADDER: Continent  ABNL LAB/BG: , 213  DRAIN/DEVICES:  none  TELEMETRY RHYTHM: n/a  SKIN: Scattered bruising and scabs.  TESTS/PROCEDURES: NA  D/C DAY/GOALS/PLACE: Pending clinical improvement  OTHER IMPORTANT INFO: pulmonology following.

## 2023-12-13 NOTE — PROGRESS NOTES
[facility]    Hospitalist Progress Note    Interval History   Stable overnight.  Per the patient breathing feels mostly the same.  Back pain also remains stable.  No other acute issues  -Data reviewed today: I reviewed all new labs and imaging results over the last 24 hours. I personally reviewed the chest x-ray image(s) showing faint residual opacities in the right upper lobe corresponding to the previous mass and posttreatment changes.  No other focal opacities. .    Physical Exam   Temp: 97.9  F (36.6  C) Temp src: Oral BP: 135/76 Pulse: 74   Resp: 18 SpO2: 95 % O2 Device: Nasal cannula Oxygen Delivery: 1 LPM  Vitals:    12/06/23 1752 12/06/23 2200   Weight: 79.4 kg (175 lb) 82.1 kg (181 lb)     Vital Signs with Ranges  Temp:  [97.4  F (36.3  C)-97.9  F (36.6  C)] 97.9  F (36.6  C)  Pulse:  [52-78] 74  Resp:  [16-20] 18  BP: (135-168)/(69-81) 135/76  SpO2:  [92 %-96 %] 95 %  I/O last 3 completed shifts:  In: 240 [P.O.:240]  Out: -     Physical Exam    General: Alert, cooperative, dyspneic with minimal activity, reclined in bed  Respiratory: Breathing appeared less labored, better air entry into the lungs today.  No wheezing heard on auscultation today   cardiac: RRR, no edema  Abdomen: Soft, nontender, nondistended  MSK: Moving all extremities  Neuro: Alert, oriented, no facial asymmetry, fluent speech  Psych: Appears a bit anxious, breathing appeared less labored    Medications      acetaminophen  1,000 mg Oral Q8H    amLODIPine  5 mg Oral At Bedtime    aspirin  81 mg Oral BID    azithromycin  500 mg Oral Every Other Day    DULoxetine  20 mg Oral BID    Fluticasone-Umeclidin-Vilant  1 puff Inhalation Daily    furosemide  20 mg Oral Daily    gabapentin  400 mg Oral TID    insulin aspart  1-7 Units Subcutaneous TID AC    insulin aspart  1-5 Units Subcutaneous At Bedtime    levothyroxine  125 mcg Oral Daily    lidocaine  2 patch Transdermal Q24H    metoprolol succinate ER  50 mg Oral QPM     pantoprazole  40 mg Oral QAM AC    polyethylene glycol  17 g Oral Daily    predniSONE  60 mg Oral Daily    rosuvastatin  10 mg Oral QPM    sennosides  1-2 tablet Oral BID    sodium chloride (PF)  3 mL Intracatheter Q8H    traZODone  50 mg Oral At Bedtime       Data   Recent Labs   Lab 12/13/23  1659 12/13/23  1138 12/13/23  0819 12/08/23  1321 12/08/23  1158 12/07/23  1652 12/06/23  1801   WBC  --   --   --   --  13.6*  --  8.7   HGB  --   --   --   --  13.7  --  15.4   MCV  --   --   --   --  94  --  92   PLT  --   --   --   --  150  --  212   NA  --   --   --   --  141  --  139   POTASSIUM  --   --   --   --  4.5  --  4.3   CHLORIDE  --   --   --   --  106  --  102   CO2  --   --   --   --  26  --  22   BUN  --   --   --   --  25.0*  --  17.6   CR  --   --   --   --  0.89  --  0.87   ANIONGAP  --   --   --   --  9  --  15   KARLIE  --   --   --   --  8.8  --  9.5   * 89 134*   < > 172*   < > 213*   ALBUMIN  --   --   --   --  3.4*  --  3.9   PROTTOTAL  --   --   --   --  6.4  --  7.3   BILITOTAL  --   --   --   --  0.4  --  0.3   ALKPHOS  --   --   --   --  155*  --  179*   ALT  --   --   --   --  19  --  24   AST  --   --   --   --  11  --  21    < > = values in this interval not displayed.       No results found for this or any previous visit (from the past 24 hour(s)).          Assessment & Plan      Harrison Thomas is a 78 year old male with past medical history significant for chronic hypoxic respiratory failure [has oxygen as needed at home], COPD, RUL lung mass s/p radiation, untreated BRENNEN, HTN, HLD, CAD, hypothyroidism admitted on 12/6/2023 with acute on chronic hypoxic respiratory failure likely 2/2 COPD exacerbation.      COPD exacerbation  Acute on Chronic hypoxemic respiratory failure   Lung mass s/p radiation treatment and probable radiation pneumonitis   BRENNEN   Pt presents to the ED with increased shortness of breath over the past week. He uses O2 at home as needed, but has using it much  more recently. He has been trying nebs at home and did get a prescription for prednisone yesterday.  * Clinical picture is consistent with COPD exacerbation. He has poor air movement on pulmonary auscultation and diffuse expiratory wheezing. BNP and procalcitonin are within normal limits. COVID, influenza and RSV panel is negative. CXR showed just faint residual opacities in the RUL corresponding to RUL mass and post-treatment changes. No other abnormalities.   * He was initially placed on BiPAP in the ED for work of breathing, but has since been weaned off. He was also treated with duonebs, mag and methylprednisolone.    - Admit to inpatient  - Continuous oximetry and supplemental O2 as needed   -Currently on IV Solu-Medrol 62.5 mg every 8 hours.  Can taper down to every 12 hours pending improvement  -Continue home trelegy Ellipta inhaler  -Currently on 1 L nasal cannula intermittently.  Uses home oxygen 2 L at night.  - Started treatment dose azithromycin (pt s on every other day dosing for prophylaxis).  Completed 5 days treatment dose, now back on QOD prophylactic dose.  -Will need home oxygen evaluation prior to discharge  -Follows with Dr. Handy with pulmonology at [facility], last seen in September 2023.  Family apparently asking about home BiPAP.  Placed consult to pulmonology for recommendations regarding possible BiPAP at home, further COPD management given patient still significantly symptomatic from advanced COPD.  Appreciate pulmonology evaluation.  CT chest done that was negative for PE, overall stable from last CT-no new significant findings.  Also underwent sniff test today that was negative.  Has been transitioned to p.o. prednisone 60 Mg daily with plans to taper down by 10 Mg every 4 days.  Of note he was on low-dose chronic prednisone PTA.  [IV went bad today and hence transitioned to p.o. prednisone].  Pulmonologist indicated that they will come back and evaluate patient later in the week.  Patient  not yet stable to discharge given significant symptoms with minimal activity, hopefully will be able to discharge home in the next day or 2 pending further input from pulmonology.  Regarding possible home BiPAP-this will likely need to be accomplished as outpatient.  -Discussed with CC who will talk with patient regarding portable home oxygen unit.    CT chest done on 12/11/2022 showed bronchial wall thickening with bronchiolitis, marked emphysematous changes and a postradiation treatment related changes seen in the right upper lobe, severe coronary artery calcification otherwise negative, cholelithiasis seen     Hypertension   Hyperlipidemia  Coronary artery disease   - Continue PTA amlodipine   - COntinue PTA ASA and rosuvastatin   - COntinue PTA metoprolol and amlodpine   - COntinue PTA lasix      Chronic low back pain   Constipation  - Continue PTA oxycodone, gabapentin   - Pt is supposed to be getting lumbar MRI soon.  Can be done as an outpatient.  -Hold naproxen for now in the setting of high-dose steroids since he would be at high risk for upper GI bleed and ulcers.  -oral PPI for prophylaxis  -Started on bowel regimen as patient is constipated  -Increase Flexeril to 10 mg 3 times daily PRN   Scheduled Tylenol  Added lidocaine patches for better pain control  Gabapentin doses switched from 600 mg twice daily to 400 mg TID for more even distribution of the dosage during the daytime     Hx of papillary thyroid cancer s/p thyroidectomy   Hypothyroidism   - Continue PTA levothyroxine      Insomnia   - Continue TPA Trazodone      Diet:Regular diet   DVT Prophylaxis: Pneumatic Compression Devices  Mir Catheter: Not present  Lines: None     Cardiac Monitoring: None  Code Status: DNR/DNI               Clinically Significant Risk Factors              # Hypoalbuminemia: Lowest albumin = 3.4 g/dL at 12/8/2023 11:58 AM, will monitor as appropriate     # Hypertension: Noted on problem list        # Overweight:  "Estimated body mass index is 25.97 kg/m  as calculated from the following:    Height as of this encounter: 1.778 m (5' 10\").    Weight as of this encounter: 82.1 kg (181 lb).             Aaliyah Valdivia MD  "

## 2023-12-14 ENCOUNTER — APPOINTMENT (OUTPATIENT)
Dept: PHYSICAL THERAPY | Facility: CLINIC | Age: 78
DRG: 190 | End: 2023-12-14
Payer: MEDICARE

## 2023-12-14 LAB
GLUCOSE BLDC GLUCOMTR-MCNC: 103 MG/DL (ref 70–99)
GLUCOSE BLDC GLUCOMTR-MCNC: 138 MG/DL (ref 70–99)
GLUCOSE BLDC GLUCOMTR-MCNC: 170 MG/DL (ref 70–99)
GLUCOSE BLDC GLUCOMTR-MCNC: 176 MG/DL (ref 70–99)
GLUCOSE BLDC GLUCOMTR-MCNC: 79 MG/DL (ref 70–99)

## 2023-12-14 PROCEDURE — 99232 SBSQ HOSP IP/OBS MODERATE 35: CPT | Performed by: INTERNAL MEDICINE

## 2023-12-14 PROCEDURE — 250N000013 HC RX MED GY IP 250 OP 250 PS 637: Performed by: STUDENT IN AN ORGANIZED HEALTH CARE EDUCATION/TRAINING PROGRAM

## 2023-12-14 PROCEDURE — 94640 AIRWAY INHALATION TREATMENT: CPT

## 2023-12-14 PROCEDURE — 250N000013 HC RX MED GY IP 250 OP 250 PS 637: Performed by: HOSPITALIST

## 2023-12-14 PROCEDURE — 250N000011 HC RX IP 250 OP 636: Mod: JZ | Performed by: INTERNAL MEDICINE

## 2023-12-14 PROCEDURE — 97530 THERAPEUTIC ACTIVITIES: CPT | Mod: GP

## 2023-12-14 PROCEDURE — 250N000009 HC RX 250: Performed by: HOSPITALIST

## 2023-12-14 PROCEDURE — 250N000013 HC RX MED GY IP 250 OP 250 PS 637: Performed by: INTERNAL MEDICINE

## 2023-12-14 PROCEDURE — 120N000001 HC R&B MED SURG/OB

## 2023-12-14 PROCEDURE — 250N000012 HC RX MED GY IP 250 OP 636 PS 637: Performed by: HOSPITALIST

## 2023-12-14 PROCEDURE — 999N000157 HC STATISTIC RCP TIME EA 10 MIN

## 2023-12-14 RX ORDER — METHYLPREDNISOLONE SODIUM SUCCINATE 125 MG/2ML
60 INJECTION, POWDER, LYOPHILIZED, FOR SOLUTION INTRAMUSCULAR; INTRAVENOUS EVERY 8 HOURS
Status: DISCONTINUED | OUTPATIENT
Start: 2023-12-14 | End: 2023-12-15

## 2023-12-14 RX ADMIN — ACETAMINOPHEN 1000 MG: 500 TABLET, FILM COATED ORAL at 13:21

## 2023-12-14 RX ADMIN — CYCLOBENZAPRINE 10 MG: 10 TABLET, FILM COATED ORAL at 23:45

## 2023-12-14 RX ADMIN — DULOXETINE HYDROCHLORIDE 20 MG: 20 CAPSULE, DELAYED RELEASE ORAL at 22:25

## 2023-12-14 RX ADMIN — ACETAMINOPHEN 1000 MG: 500 TABLET, FILM COATED ORAL at 22:24

## 2023-12-14 RX ADMIN — PREDNISONE 60 MG: 20 TABLET ORAL at 08:22

## 2023-12-14 RX ADMIN — GABAPENTIN 400 MG: 100 CAPSULE ORAL at 16:05

## 2023-12-14 RX ADMIN — ACETAMINOPHEN 1000 MG: 500 TABLET, FILM COATED ORAL at 05:51

## 2023-12-14 RX ADMIN — METOPROLOL SUCCINATE 50 MG: 50 TABLET, EXTENDED RELEASE ORAL at 22:25

## 2023-12-14 RX ADMIN — GABAPENTIN 400 MG: 100 CAPSULE ORAL at 08:22

## 2023-12-14 RX ADMIN — IPRATROPIUM BROMIDE AND ALBUTEROL SULFATE 3 ML: .5; 3 SOLUTION RESPIRATORY (INHALATION) at 23:51

## 2023-12-14 RX ADMIN — OXYCODONE HYDROCHLORIDE 5 MG: 5 TABLET ORAL at 23:45

## 2023-12-14 RX ADMIN — FUROSEMIDE 20 MG: 20 TABLET ORAL at 08:23

## 2023-12-14 RX ADMIN — PANTOPRAZOLE SODIUM 40 MG: 40 TABLET, DELAYED RELEASE ORAL at 05:51

## 2023-12-14 RX ADMIN — AZITHROMYCIN DIHYDRATE 500 MG: 250 TABLET ORAL at 08:23

## 2023-12-14 RX ADMIN — POLYETHYLENE GLYCOL 3350 17 G: 17 POWDER, FOR SOLUTION ORAL at 08:22

## 2023-12-14 RX ADMIN — METHYLPREDNISOLONE SODIUM SUCCINATE 62.5 MG: 125 INJECTION, POWDER, FOR SOLUTION INTRAMUSCULAR; INTRAVENOUS at 22:26

## 2023-12-14 RX ADMIN — SENNOSIDES 1 TABLET: 8.6 TABLET, FILM COATED ORAL at 08:23

## 2023-12-14 RX ADMIN — ASPIRIN 81 MG: 81 TABLET, COATED ORAL at 22:24

## 2023-12-14 RX ADMIN — INSULIN ASPART 1 UNITS: 100 INJECTION, SOLUTION INTRAVENOUS; SUBCUTANEOUS at 13:21

## 2023-12-14 RX ADMIN — SENNOSIDES 1 TABLET: 8.6 TABLET, FILM COATED ORAL at 22:25

## 2023-12-14 RX ADMIN — GABAPENTIN 400 MG: 100 CAPSULE ORAL at 22:24

## 2023-12-14 RX ADMIN — LIDOCAINE 2 PATCH: 4 PATCH TOPICAL at 08:22

## 2023-12-14 RX ADMIN — IPRATROPIUM BROMIDE AND ALBUTEROL SULFATE 3 ML: .5; 3 SOLUTION RESPIRATORY (INHALATION) at 17:25

## 2023-12-14 RX ADMIN — DULOXETINE HYDROCHLORIDE 20 MG: 20 CAPSULE, DELAYED RELEASE ORAL at 08:23

## 2023-12-14 RX ADMIN — ASPIRIN 81 MG: 81 TABLET, COATED ORAL at 08:24

## 2023-12-14 RX ADMIN — TRAZODONE HYDROCHLORIDE 50 MG: 50 TABLET ORAL at 22:25

## 2023-12-14 RX ADMIN — ROSUVASTATIN CALCIUM 10 MG: 10 TABLET, FILM COATED ORAL at 22:24

## 2023-12-14 RX ADMIN — AMLODIPINE BESYLATE 5 MG: 5 TABLET ORAL at 22:25

## 2023-12-14 RX ADMIN — LEVOTHYROXINE SODIUM 125 MCG: 125 TABLET ORAL at 08:23

## 2023-12-14 RX ADMIN — METHYLPREDNISOLONE SODIUM SUCCINATE 62.5 MG: 125 INJECTION, POWDER, FOR SOLUTION INTRAMUSCULAR; INTRAVENOUS at 13:20

## 2023-12-14 ASSESSMENT — ACTIVITIES OF DAILY LIVING (ADL)
ADLS_ACUITY_SCORE: 28

## 2023-12-14 NOTE — PROGRESS NOTES
[facility]    Medicine Progress Note - Hospitalist Service    Date of Admission:  12/6/2023    Assessment & Plan   Harrison Thomas is a 78 year old male with past medical history significant for chronic hypoxic respiratory failure [has oxygen as needed at home], COPD, RUL lung mass s/p radiation, untreated BRENNEN, HTN, HLD, CAD, hypothyroidism admitted on 12/6/2023 with acute on chronic hypoxic respiratory failure likely 2/2 COPD exacerbation.      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  - He uses O2 at home as needed, but has using it much more recently.  - He has been trying nebs at home and did get a prescription for prednisone prior to admission  - Clinical picture is consistent with COPD exacerbation.  - He has poor air movement on pulmonary auscultation and diffuse expiratory wheezing.  - BNP and procalcitonin are within normal limits.  - COVID, influenza and RSV panel is negative.  - CXR showed just faint residual opacities in the RUL corresponding to RUL mass and post-treatment changes.  - He was initially placed on BiPAP in the ED for work of breathing, but has since been weaned off. He was also treated with duonebs, mag and methylprednisolone.    Continuous oximetry and supplemental O2 as needed   Initially on IV Solu-Medrol every 8 hours, but was switched to p.o. prednisone from 12/12/2023  Continue home trelegy Ellipta inhaler  Started treatment dose azithromycin (pt s on every other day dosing for prophylaxis).  Completed 5 days treatment dose, now back on QOD prophylactic dose.  Will need home oxygen evaluation prior to discharge  -Follows with Dr. Handy with pulmonology at [facility], last seen in September 2023.  - Family apparently asking about home BiPAP.  Placed consult to pulmonology for  recommendations regarding possible BiPAP at home, further COPD management given patient still significantly symptomatic from advanced COPD.  - CT chest done that was negative for PE, overall stable from last CT-no new significant findings.  - Also underwent sniff test this admission that was negative.  - Has been transitioned to p.o. prednisone 60 Mg daily with plans to taper down by 10 Mg every 4 days.  (See pulmonology notes from 12/11/2023)  - Of note he was on low-dose chronic prednisone PTA.  -Transition to oral prednisone on 12/12/2030  -Pulmonologist indicated that they will come back and evaluate patient later in the week.  - Patient not yet stable to discharge given significant symptoms with minimal activity, hopefully will be able to discharge home in the next day or 2 pending further input from pulmonology.    - Regarding possible home BiPAP-this will likely need to be accomplished as outpatient.  -Given persistent wheeze which is quite audible, patient to be initiated again on IV Solu-Medrol.      Hypertension   Hyperlipidemia  Coronary artery disease   - Continue PTA amlodipine   - COntinue PTA ASA and rosuvastatin   - COntinue PTA metoprolol and amlodpine   - COntinue PTA lasix      Chronic low back pain   Constipation  Continue PTA oxycodone, gabapentin   - Pt is supposed to be getting lumbar MRI soon.  Can be done as an outpatient.  Hold naproxen for now in the setting of high-dose steroids since he would be at high risk for upper GI bleed and ulcers.  Oral PPI for prophylaxis  On bowel regimen for constipation   Increase Flexeril to 10 mg 3 times daily PRN   Scheduled Tylenol  Added lidocaine patches for better pain control  Gabapentin doses switched from 600 mg twice daily to 400 mg TID for more even distribution of the dosage during the daytime     Hx of papillary thyroid cancer s/p thyroidectomy   Hypothyroidism   - Continue PTA levothyroxine      Insomnia   - Continue TPA Trazodone          "  Diet: Regular Diet Adult    DVT Prophylaxis: Pneumatic Compression Devices  Mir Catheter: Not present  Lines: None     Cardiac Monitoring: None  Code Status: No CPR- Do NOT Intubate      Clinically Significant Risk Factors              # Hypoalbuminemia: Lowest albumin = 3.4 g/dL at 12/8/2023 11:58 AM, will monitor as appropriate     # Hypertension: Noted on problem list        # Overweight: Estimated body mass index is 25.97 kg/m  as calculated from the following:    Height as of this encounter: 1.778 m (5' 10\").    Weight as of this encounter: 82.1 kg (181 lb).             Disposition Plan encourage reevaluation by day team for possible discharge in the next day or 2.    Expected Discharge Date: 12/14/2023                    LUCIANA LOWE MD  Hospitalist Service  [facility]  Securely message with [employer] (more info)  Text page via [facility] Paging/Directory   ______________________________________________________________________    Interval History   -Continues to report increased SOB with difficulty ambulating  -Per RN, patient's dtr just tested positive for COVID  -Has been seen by PT who had recommended home with PT     Physical Exam   Vital Signs: Temp: 99.5  F (37.5  C) Temp src: Oral BP: (!) 156/91 Pulse: 57   Resp: 18 SpO2: 93 % O2 Device: None (Room air) Oxygen Delivery: 1 LPM  Weight: 180 lbs 15.96 oz    General Appearance: Lying in bed, on room air,  HEENT: PERRL: EOMI; moist mucous membrane w/o lesions  Neck: No JVD  Pulmonary: Tachypneic, visibly audible wheeze without auscultation.  On auscultation has bilateral expiratory wheezes with prolonged expiratory phase.  CVS: Regular rhythm, no murmurs, rubs or gallops  GI: BS (+), soft nontender, no rebound or guarding   Extremities: No LE edema   Skin: No rashes or lesions  Neurologic: A&O x3      Medical Decision Making       45 MINUTES SPENT BY ME on the date of service doing chart review, history, exam, documentation & further " activities per the note.      Data   ------------------------- PAST 24 HR DATA REVIEWED -----------------------------------------------        Imaging results reviewed over the past 24 hrs:   No results found for this or any previous visit (from the past 24 hour(s)).

## 2023-12-14 NOTE — PLAN OF CARE
Summary: SOB d/t COPD exacerbation.  Date & Time: 12/13/23 1930-0730  Orientation: A&Ox4  Activity Level: Ind w/ cane  Fall Risk: Y  Behavior & Aggression: Green  Pain Management: Scheduled Tylenol, PRN Flexiril, and Oxycodone  ABNL VS/O2: Vss on 1L NC  Tele: -  ABNL Lab/BG: , 138  Diet: Regualr  Bowel/Bladder: Continent B&B  Skin: scattered bruising and scabs  Drains/Devices: No IV acess  Tests/Procedures: -  Anticipated DC Date: pending  Other Important Info: -Will need home oxygen evaluation prior to discharge. Pt requesting BiPAP, will be accomplished in outpatient post discharge

## 2023-12-14 NOTE — PLAN OF CARE
Summary: SOB d/t COPD exacerbation.     DATE & TIME: 12/13/23 1079-0942    Cognitive Concerns/ Orientation : A&Ox4   BEHAVIOR & AGGRESSION TOOL COLOR: Green  ABNL VS/O2: VSS on RA. LS coarse rales with expiratory wheezes   MOBILITY: Independent with cane.  PAIN MANAGMENT: scheduled Flexeril and PRN Oxycodone given x1 for lumbar back pain. Lidocaine patch applied on back  DIET: Regular   BOWEL/BLADDER: Continent  ABNL LAB/BG: , 89, 194  DRAIN/DEVICES:  None  TELEMETRY RHYTHM: NA  SKIN: Scattered bruising and scabs.  TESTS/PROCEDURES: NA  D/C DAY/GOALS/PLACE: Pending clinical improvement  OTHER IMPORTANT INFO: Pulmonology following.

## 2023-12-15 LAB
C PNEUM DNA SPEC QL NAA+PROBE: NOT DETECTED
CREAT SERPL-MCNC: 0.81 MG/DL (ref 0.67–1.17)
EGFRCR SERPLBLD CKD-EPI 2021: >90 ML/MIN/1.73M2
FLUAV H1 2009 PAND RNA SPEC QL NAA+PROBE: NOT DETECTED
FLUAV H1 RNA SPEC QL NAA+PROBE: NOT DETECTED
FLUAV H3 RNA SPEC QL NAA+PROBE: NOT DETECTED
FLUAV RNA SPEC QL NAA+PROBE: NOT DETECTED
FLUBV RNA SPEC QL NAA+PROBE: NOT DETECTED
GLUCOSE BLDC GLUCOMTR-MCNC: 134 MG/DL (ref 70–99)
GLUCOSE BLDC GLUCOMTR-MCNC: 185 MG/DL (ref 70–99)
GLUCOSE BLDC GLUCOMTR-MCNC: 203 MG/DL (ref 70–99)
GLUCOSE BLDC GLUCOMTR-MCNC: 210 MG/DL (ref 70–99)
HADV DNA SPEC QL NAA+PROBE: NOT DETECTED
HCOV PNL SPEC NAA+PROBE: NOT DETECTED
HMPV RNA SPEC QL NAA+PROBE: NOT DETECTED
HPIV1 RNA SPEC QL NAA+PROBE: NOT DETECTED
HPIV2 RNA SPEC QL NAA+PROBE: NOT DETECTED
HPIV3 RNA SPEC QL NAA+PROBE: NOT DETECTED
HPIV4 RNA SPEC QL NAA+PROBE: NOT DETECTED
M PNEUMO DNA SPEC QL NAA+PROBE: NOT DETECTED
RSV RNA SPEC QL NAA+PROBE: NOT DETECTED
RSV RNA SPEC QL NAA+PROBE: NOT DETECTED
RV+EV RNA SPEC QL NAA+PROBE: NOT DETECTED

## 2023-12-15 PROCEDURE — 250N000013 HC RX MED GY IP 250 OP 250 PS 637: Performed by: INTERNAL MEDICINE

## 2023-12-15 PROCEDURE — 120N000001 HC R&B MED SURG/OB

## 2023-12-15 PROCEDURE — 250N000013 HC RX MED GY IP 250 OP 250 PS 637: Performed by: HOSPITALIST

## 2023-12-15 PROCEDURE — 250N000013 HC RX MED GY IP 250 OP 250 PS 637: Performed by: STUDENT IN AN ORGANIZED HEALTH CARE EDUCATION/TRAINING PROGRAM

## 2023-12-15 PROCEDURE — 94640 AIRWAY INHALATION TREATMENT: CPT | Mod: 76

## 2023-12-15 PROCEDURE — 94640 AIRWAY INHALATION TREATMENT: CPT

## 2023-12-15 PROCEDURE — 36415 COLL VENOUS BLD VENIPUNCTURE: CPT | Performed by: STUDENT IN AN ORGANIZED HEALTH CARE EDUCATION/TRAINING PROGRAM

## 2023-12-15 PROCEDURE — 250N000009 HC RX 250: Performed by: STUDENT IN AN ORGANIZED HEALTH CARE EDUCATION/TRAINING PROGRAM

## 2023-12-15 PROCEDURE — 87581 M.PNEUMON DNA AMP PROBE: CPT | Performed by: STUDENT IN AN ORGANIZED HEALTH CARE EDUCATION/TRAINING PROGRAM

## 2023-12-15 PROCEDURE — 250N000011 HC RX IP 250 OP 636: Mod: JZ | Performed by: STUDENT IN AN ORGANIZED HEALTH CARE EDUCATION/TRAINING PROGRAM

## 2023-12-15 PROCEDURE — 250N000011 HC RX IP 250 OP 636: Mod: JZ | Performed by: INTERNAL MEDICINE

## 2023-12-15 PROCEDURE — 999N000157 HC STATISTIC RCP TIME EA 10 MIN

## 2023-12-15 PROCEDURE — 99221 1ST HOSP IP/OBS SF/LOW 40: CPT | Performed by: STUDENT IN AN ORGANIZED HEALTH CARE EDUCATION/TRAINING PROGRAM

## 2023-12-15 PROCEDURE — 99232 SBSQ HOSP IP/OBS MODERATE 35: CPT | Performed by: STUDENT IN AN ORGANIZED HEALTH CARE EDUCATION/TRAINING PROGRAM

## 2023-12-15 PROCEDURE — 82565 ASSAY OF CREATININE: CPT | Performed by: STUDENT IN AN ORGANIZED HEALTH CARE EDUCATION/TRAINING PROGRAM

## 2023-12-15 PROCEDURE — 87633 RESP VIRUS 12-25 TARGETS: CPT | Performed by: STUDENT IN AN ORGANIZED HEALTH CARE EDUCATION/TRAINING PROGRAM

## 2023-12-15 RX ORDER — IPRATROPIUM BROMIDE AND ALBUTEROL SULFATE 2.5; .5 MG/3ML; MG/3ML
3 SOLUTION RESPIRATORY (INHALATION)
Status: DISCONTINUED | OUTPATIENT
Start: 2023-12-15 | End: 2024-01-10 | Stop reason: HOSPADM

## 2023-12-15 RX ORDER — ACETYLCYSTEINE 200 MG/ML
4 SOLUTION ORAL; RESPIRATORY (INHALATION)
Status: DISCONTINUED | OUTPATIENT
Start: 2023-12-15 | End: 2023-12-18

## 2023-12-15 RX ORDER — METHYLPREDNISOLONE SODIUM SUCCINATE 125 MG/2ML
60 INJECTION, POWDER, LYOPHILIZED, FOR SOLUTION INTRAMUSCULAR; INTRAVENOUS EVERY 12 HOURS
Status: DISCONTINUED | OUTPATIENT
Start: 2023-12-16 | End: 2023-12-16

## 2023-12-15 RX ADMIN — DULOXETINE HYDROCHLORIDE 20 MG: 20 CAPSULE, DELAYED RELEASE ORAL at 22:15

## 2023-12-15 RX ADMIN — IPRATROPIUM BROMIDE AND ALBUTEROL SULFATE 3 ML: .5; 3 SOLUTION RESPIRATORY (INHALATION) at 12:19

## 2023-12-15 RX ADMIN — PANTOPRAZOLE SODIUM 40 MG: 40 TABLET, DELAYED RELEASE ORAL at 06:42

## 2023-12-15 RX ADMIN — ASPIRIN 81 MG: 81 TABLET, COATED ORAL at 08:01

## 2023-12-15 RX ADMIN — TRAZODONE HYDROCHLORIDE 50 MG: 50 TABLET ORAL at 22:14

## 2023-12-15 RX ADMIN — METHYLPREDNISOLONE SODIUM SUCCINATE 62.5 MG: 125 INJECTION, POWDER, FOR SOLUTION INTRAMUSCULAR; INTRAVENOUS at 23:57

## 2023-12-15 RX ADMIN — ACETAMINOPHEN 1000 MG: 500 TABLET, FILM COATED ORAL at 06:42

## 2023-12-15 RX ADMIN — IPRATROPIUM BROMIDE AND ALBUTEROL SULFATE 3 ML: .5; 3 SOLUTION RESPIRATORY (INHALATION) at 19:51

## 2023-12-15 RX ADMIN — OXYCODONE HYDROCHLORIDE 5 MG: 5 TABLET ORAL at 19:28

## 2023-12-15 RX ADMIN — SENNOSIDES 2 TABLET: 8.6 TABLET, FILM COATED ORAL at 22:15

## 2023-12-15 RX ADMIN — METHYLPREDNISOLONE SODIUM SUCCINATE 62.5 MG: 125 INJECTION, POWDER, FOR SOLUTION INTRAMUSCULAR; INTRAVENOUS at 04:32

## 2023-12-15 RX ADMIN — DULOXETINE HYDROCHLORIDE 20 MG: 20 CAPSULE, DELAYED RELEASE ORAL at 08:01

## 2023-12-15 RX ADMIN — OXYCODONE HYDROCHLORIDE 5 MG: 5 TABLET ORAL at 11:16

## 2023-12-15 RX ADMIN — GABAPENTIN 400 MG: 100 CAPSULE ORAL at 22:15

## 2023-12-15 RX ADMIN — LEVOTHYROXINE SODIUM 125 MCG: 125 TABLET ORAL at 08:02

## 2023-12-15 RX ADMIN — GABAPENTIN 400 MG: 100 CAPSULE ORAL at 17:01

## 2023-12-15 RX ADMIN — LIDOCAINE 2 PATCH: 4 PATCH TOPICAL at 07:57

## 2023-12-15 RX ADMIN — SENNOSIDES 2 TABLET: 8.6 TABLET, FILM COATED ORAL at 08:02

## 2023-12-15 RX ADMIN — ACETAMINOPHEN 1000 MG: 500 TABLET, FILM COATED ORAL at 22:14

## 2023-12-15 RX ADMIN — ROSUVASTATIN CALCIUM 10 MG: 10 TABLET, FILM COATED ORAL at 19:28

## 2023-12-15 RX ADMIN — ASPIRIN 81 MG: 81 TABLET, COATED ORAL at 22:15

## 2023-12-15 RX ADMIN — METHYLPREDNISOLONE SODIUM SUCCINATE 62.5 MG: 125 INJECTION, POWDER, FOR SOLUTION INTRAMUSCULAR; INTRAVENOUS at 12:04

## 2023-12-15 RX ADMIN — FUROSEMIDE 20 MG: 20 TABLET ORAL at 08:02

## 2023-12-15 RX ADMIN — METOPROLOL SUCCINATE 50 MG: 50 TABLET, EXTENDED RELEASE ORAL at 19:28

## 2023-12-15 RX ADMIN — GABAPENTIN 400 MG: 100 CAPSULE ORAL at 08:02

## 2023-12-15 RX ADMIN — INSULIN ASPART 2 UNITS: 100 INJECTION, SOLUTION INTRAVENOUS; SUBCUTANEOUS at 07:56

## 2023-12-15 RX ADMIN — INSULIN ASPART 1 UNITS: 100 INJECTION, SOLUTION INTRAVENOUS; SUBCUTANEOUS at 12:05

## 2023-12-15 RX ADMIN — ACETAMINOPHEN 1000 MG: 500 TABLET, FILM COATED ORAL at 13:40

## 2023-12-15 RX ADMIN — CYCLOBENZAPRINE 10 MG: 10 TABLET, FILM COATED ORAL at 19:29

## 2023-12-15 RX ADMIN — INSULIN ASPART 2 UNITS: 100 INJECTION, SOLUTION INTRAVENOUS; SUBCUTANEOUS at 17:23

## 2023-12-15 RX ADMIN — IPRATROPIUM BROMIDE AND ALBUTEROL SULFATE 3 ML: .5; 3 SOLUTION RESPIRATORY (INHALATION) at 15:16

## 2023-12-15 RX ADMIN — AMLODIPINE BESYLATE 5 MG: 5 TABLET ORAL at 22:15

## 2023-12-15 ASSESSMENT — ACTIVITIES OF DAILY LIVING (ADL)
ADLS_ACUITY_SCORE: 28
ADLS_ACUITY_SCORE: 26
ADLS_ACUITY_SCORE: 28
ADLS_ACUITY_SCORE: 26
ADLS_ACUITY_SCORE: 26
ADLS_ACUITY_SCORE: 28
ADLS_ACUITY_SCORE: 26
ADLS_ACUITY_SCORE: 28

## 2023-12-15 NOTE — PROGRESS NOTES
[facility]    Medicine Progress Note - Hospitalist Service    Date of Admission:  12/6/2023    Assessment & Plan    nathaniel Thomas is a 78 year old male with past medical history significant for chronic hypoxic respiratory failure [has oxygen as needed at home], COPD, RUL lung mass s/p radiation, untreated BRENNEN, HTN, HLD, CAD, hypothyroidism admitted on 12/6/2023 with acute on chronic hypoxic respiratory failure likely 2/2 COPD exacerbation.      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  - He uses O2 at home as needed, but has using it much more recently.  - He has been trying nebs at home and did get a prescription for prednisone prior to admission  - Clinical picture is consistent with COPD exacerbation.  - He has poor air movement on pulmonary auscultation and diffuse expiratory wheezing.  - BNP and procalcitonin are within normal limits.  - COVID, influenza and RSV panel is negative.  - CXR showed just faint residual opacities in the RUL corresponding to RUL mass and post-treatment changes.  - He was initially placed on BiPAP in the ED for work of breathing, but has since been weaned off. He was also treated with duonebs, mag and methylprednisolone.    Continuous oximetry and supplemental O2 as needed   Initially on IV Solu-Medrol every 8 hours, but was switched to p.o. prednisone from 12/12/2023  Continue home trelegy Ellipta inhaler  Started treatment dose azithromycin (pt s on every other day dosing for prophylaxis).  Completed 5 days treatment dose, now back on QOD prophylactic dose.  Will need home oxygen evaluation prior to discharge  -Follows with Dr. Handy with pulmonology at [facility], last seen in September 2023.  - Family apparently asking about home BiPAP.  Placed consult to pulmonology for  recommendations regarding possible BiPAP at home, further COPD management given patient still significantly symptomatic from advanced COPD.  - CT chest done that was negative for PE, overall stable from last CT-no new significant findings.  - Also underwent sniff test this admission that was negative.  -- Of note he was on low-dose chronic prednisone PTA.  - Regarding possible home BiPAP-this will likely need to be accomplished as outpatient.  -IV solumedrol  -Scheduled Duonebs  -Acetylcysteine nebs  -Currently on Room air   -Use ICS and flutter valve           Hypertension   Hyperlipidemia  Coronary artery disease   - Continue PTA amlodipine   - COntinue PTA ASA and rosuvastatin   - COntinue PTA metoprolol and amlodpine   - COntinue PTA lasix      Chronic low back pain   Constipation  Continue PTA oxycodone, gabapentin   - Pt is supposed to be getting lumbar MRI soon.  Can be done as an outpatient.  Hold naproxen for now in the setting of high-dose steroids since he would be at high risk for upper GI bleed and ulcers.  Oral PPI for prophylaxis  On bowel regimen for constipation   Increase Flexeril to 10 mg 3 times daily PRN   Scheduled Tylenol  Added lidocaine patches for better pain control  Gabapentin doses switched from 600 mg twice daily to 400 mg TID for more even distribution of the dosage during the daytime     Hx of papillary thyroid cancer s/p thyroidectomy   Hypothyroidism   - Continue PTA levothyroxine      Insomnia   - Continue TPA Trazodone           Diet: Regular Diet Adult    DVT Prophylaxis: Pneumatic Compression Devices  Mir Catheter: Not present  Lines: None     Cardiac Monitoring: None  Code Status: No CPR- Do NOT Intubate      Clinically Significant Risk Factors              # Hypoalbuminemia: Lowest albumin = 3.4 g/dL at 12/8/2023 11:58 AM, will monitor as appropriate     # Hypertension: Noted on problem list        # Overweight: Estimated body mass index is 25.97 kg/m  as calculated from the  "following:    Height as of this encounter: 1.778 m (5' 10\").    Weight as of this encounter: 82.1 kg (181 lb).             Disposition Plan      Expected Discharge Date: 12/17/2023                    Nakul Burgos MD  Hospitalist Service  [facility]  Securely message with [employer] (more info)  Text page via [employer] Paging/Directory   ______________________________________________________________________    Interval History   Partial dictation added by Nakul Burgos MD. Open the Dictate Later In Basket folder in [employer] for iOS to finish this dictation.   Physical Exam   Vital Signs: Temp: 98.3  F (36.8  C) Temp src: Oral BP: 136/72 Pulse: 80   Resp: 18 SpO2: 96 % O2 Device: None (Room air)    Weight: 180 lbs 15.96 oz    Physical Exam  Cardiovascular:      Rate and Rhythm: Normal rate and regular rhythm.   Pulmonary:      Effort: Respiratory distress present.      Breath sounds: Wheezing present.   Abdominal:      General: There is no distension.      Palpations: Abdomen is soft.      Tenderness: There is no abdominal tenderness.   Neurological:      Mental Status: He is alert.          Medical Decision Making             Data     I have personally reviewed the following data over the past 24 hrs:    N/A  \   N/A   / N/A     N/A N/A N/A /  185 (H)   N/A N/A 0.81 \       Imaging results reviewed over the past 24 hrs:   No results found for this or any previous visit (from the past 24 hour(s)).  "

## 2023-12-15 NOTE — PLAN OF CARE
Summary: SOB d/t COPD exacerbation.     DATE & TIME: 12/14/23 NOC     Cognitive Concerns/ Orientation : A&Ox4   BEHAVIOR & AGGRESSION TOOL COLOR: Green  ABNL VS/O2: VSS on RA remains ACEVES - LS coarse rales with expiratory wheezes - prednisone po switched to solumedrol IV  MOBILITY: Independent with cane - ambulated in halls x3 with SBA  PAIN MANAGMENT: Prn Flexeril and PRN Oxycodone for lumbar back pain. Lidocaine patches removed on back. Prn neb given for wheezing  DIET: Regular   BOWEL/BLADDER: Continent - last BM 12/13  ABNL LAB/BG: , 134  DRAIN/DEVICES:  None  TELEMETRY RHYTHM: NA  SKIN: Scattered bruising and scabs.  TESTS/PROCEDURES: NA  D/C DAY/GOALS/PLACE: Home with homecare pending clinical improvement  OTHER IMPORTANT INFO: Pulmonology following. Patient is requesting BiPAP - will be accomplished in outpatient post discharge  Other Important Info: Daughter, who usually helps the patient, just tested positive for COVID on 12/11 with symptoms starting 12/10.

## 2023-12-15 NOTE — PLAN OF CARE
Goal Outcome Evaluation:DATE & TIME: 12/15/23 9497-4338   Cognitive Concerns/ Orientation : A&Ox4   BEHAVIOR & AGGRESSION TOOL COLOR: Green  ABNL VS/O2: VSS on RA remains ACEVES - LS  with expiratory wheezes -continues  on  solumedrol IV.  MOBILITY: Independent with cane.  PAIN MANAGMENT:  PRN Oxycodone x1 , scheduled Tylenol and Lidocaine patchs to  low back pain  DIET: Regular   BOWEL/BLADDER: Continent -   ABNORMAL LABS:/185, sent swab for  Resp. Viral panel.  DRAIN/DEVICES: PIV SL  TELEMETRY RHYTHM: NA  SKIN: Scattered bruising and scabs.  TESTS/PROCEDURES: NA  D/C DAY/GOALS/PLACE: Home with homecare pending clinical improvement  OTHER IMPORTANT INFO: Pulmonology following. Started on scheduled nebs.  Other Important Info:  short of breath, LS Expi, wheezes,non productive cough,encouraged to use IS.

## 2023-12-15 NOTE — PLAN OF CARE
Summary: SOB d/t COPD exacerbation.     DATE & TIME: 12/14/23 [id number]     Cognitive Concerns/ Orientation : A&Ox4   BEHAVIOR & AGGRESSION TOOL COLOR: Green  ABNL VS/O2: VSS on RA remains ACEVES - LS coarse rales with expiratory wheezes - prednisone po switched to solumedrol IV  MOBILITY: Independent with cane - ambulated in halls x3 with SBA  PAIN MANAGMENT: Scheduled Flexeril and PRN Oxycodone for lumbar back pain. Lidocaine patches applied on back  DIET: Regular   BOWEL/BLADDER: Continent - last BM 12/13  ABNL LAB/BG: BG 79, 176, 103  DRAIN/DEVICES:  None  TELEMETRY RHYTHM: NA  SKIN: Scattered bruising and scabs.  TESTS/PROCEDURES: NA  D/C DAY/GOALS/PLACE: Home with homecare pending clinical improvement  OTHER IMPORTANT INFO: Pulmonology following. Patient is requesting BiPAP - will be accomplished in outpatient post discharge  Other Important Info: Daughter, who usually helps the patient, just tested positive for COVID on 12/11 with symptoms starting 12/10.

## 2023-12-16 LAB
ANION GAP SERPL CALCULATED.3IONS-SCNC: 10 MMOL/L (ref 7–15)
BUN SERPL-MCNC: 22.7 MG/DL (ref 8–23)
CALCIUM SERPL-MCNC: 8 MG/DL (ref 8.8–10.2)
CHLORIDE SERPL-SCNC: 101 MMOL/L (ref 98–107)
CREAT SERPL-MCNC: 1.03 MG/DL (ref 0.67–1.17)
DEPRECATED HCO3 PLAS-SCNC: 27 MMOL/L (ref 22–29)
EGFRCR SERPLBLD CKD-EPI 2021: 75 ML/MIN/1.73M2
ERYTHROCYTE [DISTWIDTH] IN BLOOD BY AUTOMATED COUNT: 14.6 % (ref 10–15)
GLUCOSE BLDC GLUCOMTR-MCNC: 137 MG/DL (ref 70–99)
GLUCOSE SERPL-MCNC: 190 MG/DL (ref 70–99)
HCT VFR BLD AUTO: 40.8 % (ref 40–53)
HGB BLD-MCNC: 13.3 G/DL (ref 13.3–17.7)
MCH RBC QN AUTO: 30.4 PG (ref 26.5–33)
MCHC RBC AUTO-ENTMCNC: 32.6 G/DL (ref 31.5–36.5)
MCV RBC AUTO: 93 FL (ref 78–100)
PLATELET # BLD AUTO: 134 10E3/UL (ref 150–450)
POTASSIUM SERPL-SCNC: 4 MMOL/L (ref 3.4–5.3)
RBC # BLD AUTO: 4.37 10E6/UL (ref 4.4–5.9)
SODIUM SERPL-SCNC: 138 MMOL/L (ref 135–145)
WBC # BLD AUTO: 14.9 10E3/UL (ref 4–11)

## 2023-12-16 PROCEDURE — 999N000157 HC STATISTIC RCP TIME EA 10 MIN

## 2023-12-16 PROCEDURE — 94640 AIRWAY INHALATION TREATMENT: CPT

## 2023-12-16 PROCEDURE — 250N000013 HC RX MED GY IP 250 OP 250 PS 637: Performed by: STUDENT IN AN ORGANIZED HEALTH CARE EDUCATION/TRAINING PROGRAM

## 2023-12-16 PROCEDURE — 85018 HEMOGLOBIN: CPT | Performed by: STUDENT IN AN ORGANIZED HEALTH CARE EDUCATION/TRAINING PROGRAM

## 2023-12-16 PROCEDURE — 94640 AIRWAY INHALATION TREATMENT: CPT | Mod: 76

## 2023-12-16 PROCEDURE — 36415 COLL VENOUS BLD VENIPUNCTURE: CPT | Performed by: STUDENT IN AN ORGANIZED HEALTH CARE EDUCATION/TRAINING PROGRAM

## 2023-12-16 PROCEDURE — 250N000013 HC RX MED GY IP 250 OP 250 PS 637: Performed by: INTERNAL MEDICINE

## 2023-12-16 PROCEDURE — 120N000001 HC R&B MED SURG/OB

## 2023-12-16 PROCEDURE — 99232 SBSQ HOSP IP/OBS MODERATE 35: CPT | Performed by: STUDENT IN AN ORGANIZED HEALTH CARE EDUCATION/TRAINING PROGRAM

## 2023-12-16 PROCEDURE — 250N000009 HC RX 250: Performed by: STUDENT IN AN ORGANIZED HEALTH CARE EDUCATION/TRAINING PROGRAM

## 2023-12-16 PROCEDURE — 250N000013 HC RX MED GY IP 250 OP 250 PS 637: Performed by: HOSPITALIST

## 2023-12-16 PROCEDURE — 80048 BASIC METABOLIC PNL TOTAL CA: CPT | Performed by: STUDENT IN AN ORGANIZED HEALTH CARE EDUCATION/TRAINING PROGRAM

## 2023-12-16 RX ORDER — METHYLPREDNISOLONE SODIUM SUCCINATE 125 MG/2ML
60 INJECTION, POWDER, LYOPHILIZED, FOR SOLUTION INTRAMUSCULAR; INTRAVENOUS EVERY 24 HOURS
Status: DISCONTINUED | OUTPATIENT
Start: 2023-12-17 | End: 2023-12-19

## 2023-12-16 RX ADMIN — METOPROLOL SUCCINATE 50 MG: 50 TABLET, EXTENDED RELEASE ORAL at 21:45

## 2023-12-16 RX ADMIN — AZITHROMYCIN DIHYDRATE 500 MG: 250 TABLET ORAL at 09:52

## 2023-12-16 RX ADMIN — ACETYLCYSTEINE 4 ML: 200 SOLUTION ORAL; RESPIRATORY (INHALATION) at 08:37

## 2023-12-16 RX ADMIN — ASPIRIN 81 MG: 81 TABLET, COATED ORAL at 09:52

## 2023-12-16 RX ADMIN — IPRATROPIUM BROMIDE AND ALBUTEROL SULFATE 3 ML: .5; 3 SOLUTION RESPIRATORY (INHALATION) at 15:33

## 2023-12-16 RX ADMIN — GABAPENTIN 400 MG: 100 CAPSULE ORAL at 21:45

## 2023-12-16 RX ADMIN — DULOXETINE HYDROCHLORIDE 20 MG: 20 CAPSULE, DELAYED RELEASE ORAL at 21:45

## 2023-12-16 RX ADMIN — OXYCODONE HYDROCHLORIDE 5 MG: 5 TABLET ORAL at 00:08

## 2023-12-16 RX ADMIN — ROSUVASTATIN CALCIUM 10 MG: 10 TABLET, FILM COATED ORAL at 21:45

## 2023-12-16 RX ADMIN — IPRATROPIUM BROMIDE AND ALBUTEROL SULFATE 3 ML: .5; 3 SOLUTION RESPIRATORY (INHALATION) at 12:35

## 2023-12-16 RX ADMIN — GABAPENTIN 400 MG: 100 CAPSULE ORAL at 09:52

## 2023-12-16 RX ADMIN — TRAZODONE HYDROCHLORIDE 50 MG: 50 TABLET ORAL at 21:45

## 2023-12-16 RX ADMIN — DULOXETINE HYDROCHLORIDE 20 MG: 20 CAPSULE, DELAYED RELEASE ORAL at 09:52

## 2023-12-16 RX ADMIN — IPRATROPIUM BROMIDE AND ALBUTEROL SULFATE 3 ML: .5; 3 SOLUTION RESPIRATORY (INHALATION) at 08:37

## 2023-12-16 RX ADMIN — INSULIN ASPART 1 UNITS: 100 INJECTION, SOLUTION INTRAVENOUS; SUBCUTANEOUS at 09:55

## 2023-12-16 RX ADMIN — ACETYLCYSTEINE 4 ML: 200 SOLUTION ORAL; RESPIRATORY (INHALATION) at 12:35

## 2023-12-16 RX ADMIN — ASPIRIN 81 MG: 81 TABLET, COATED ORAL at 21:45

## 2023-12-16 RX ADMIN — INSULIN ASPART 1 UNITS: 100 INJECTION, SOLUTION INTRAVENOUS; SUBCUTANEOUS at 12:23

## 2023-12-16 RX ADMIN — LEVOTHYROXINE SODIUM 125 MCG: 125 TABLET ORAL at 09:52

## 2023-12-16 RX ADMIN — IPRATROPIUM BROMIDE AND ALBUTEROL SULFATE 3 ML: .5; 3 SOLUTION RESPIRATORY (INHALATION) at 20:34

## 2023-12-16 RX ADMIN — GABAPENTIN 400 MG: 100 CAPSULE ORAL at 16:16

## 2023-12-16 RX ADMIN — PANTOPRAZOLE SODIUM 40 MG: 40 TABLET, DELAYED RELEASE ORAL at 06:48

## 2023-12-16 RX ADMIN — ACETAMINOPHEN 1000 MG: 500 TABLET, FILM COATED ORAL at 06:48

## 2023-12-16 RX ADMIN — SENNOSIDES 1 TABLET: 8.6 TABLET, FILM COATED ORAL at 09:52

## 2023-12-16 RX ADMIN — FUROSEMIDE 20 MG: 20 TABLET ORAL at 09:52

## 2023-12-16 RX ADMIN — ACETYLCYSTEINE 4 ML: 200 SOLUTION ORAL; RESPIRATORY (INHALATION) at 20:34

## 2023-12-16 RX ADMIN — ACETAMINOPHEN 1000 MG: 500 TABLET, FILM COATED ORAL at 16:16

## 2023-12-16 RX ADMIN — AMLODIPINE BESYLATE 5 MG: 5 TABLET ORAL at 21:45

## 2023-12-16 RX ADMIN — CYCLOBENZAPRINE 10 MG: 10 TABLET, FILM COATED ORAL at 21:53

## 2023-12-16 RX ADMIN — ACETAMINOPHEN 1000 MG: 500 TABLET, FILM COATED ORAL at 21:45

## 2023-12-16 RX ADMIN — OXYCODONE HYDROCHLORIDE 5 MG: 5 TABLET ORAL at 21:53

## 2023-12-16 RX ADMIN — ACETYLCYSTEINE 4 ML: 200 SOLUTION ORAL; RESPIRATORY (INHALATION) at 15:34

## 2023-12-16 ASSESSMENT — ACTIVITIES OF DAILY LIVING (ADL)
ADLS_ACUITY_SCORE: 28
ADLS_ACUITY_SCORE: 26
ADLS_ACUITY_SCORE: 28

## 2023-12-16 NOTE — PLAN OF CARE
Summary: SOB d/t COPD exacerbation.     DATE & TIME: 12/15/23 Evenings   Cognitive Concerns/ Orientation : A&Ox4. Calm and cooperative. Calls as needed.    BEHAVIOR & AGGRESSION TOOL COLOR: Green  ABNL VS/O2: VSS with BP in the 100-120s this shift. On RA. 2L O2 via NC when napping or at night. Dry, infreq, non-productive cough. Exp wheezing. On scheduled Nebs.   MOBILITY: Independent with cane.  PAIN MANAGMENT: Chronic lower back pain. PRN Flexeril and PRN Oxycodone given x1 . Lidocaine patchs removed this shift. Scheduled Tylenol.   DIET: Regular. Good appetite and fluid intake.   BOWEL/BLADDER: Continent. Using bathroom. 2 Senna given. Pt reports no BM in 2 days.   ABNORMAL LABS: , 296. Viral panel negative   DRAIN/DEVICES: PIV SL  TELEMETRY RHYTHM: NA  SKIN: Scattered bruising and scabs.  TESTS/PROCEDURES: AM labs   D/C DAY/GOALS/PLACE: Home with homecare pending clinical improvement  OTHER IMPORTANT INFO: Pulmonology following.   Other Important Info: Daughter, who usually helps the patient, just tested positive for COVID on 12/11 with symptoms starting 12/10.
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
## 2023-12-16 NOTE — PLAN OF CARE
Summary: Shortness of breath due to COPD exacerbation     DATE & TIME: 12/15/23 6833-9234  Cognitive Concerns/ Orientation: Alert/Oriented x 4; calls appropriately    BEHAVIOR & AGGRESSION TOOL COLOR: Green  ABNL VS/O2: VSS, 1 LPM oxygen via nasal cannula with O2 sats 92% (using supplemental oxygen during naps and overnight); Infrequent, non-productive cough; Expiratory wheezing with scheduled nebs   MOBILITY: Independent, cane  PAIN MANAGMENT: Oxycodone given x 1 for chronic low back pain; PRN Flexeril, Scheduled Tylenol; lidocaine patches removed on evening shift   DIET: Regular  BOWEL/BLADDER: Continent. Using bathroom. 2 Senna given. Pt reports no BM in 2 days.   ABNORMAL LABS: , 296. Viral panel negative   DRAIN/DEVICES: PIV SL  TELEMETRY RHYTHM: NA  SKIN: Scattered bruising and scabs.  TESTS/PROCEDURES: AM labs   D/C DAY/GOALS/PLACE: Home with homecare pending clinical improvement  OTHER IMPORTANT INFO: Senna given on evening shift; Pulmonology following; Daughter (assists patient) tested positive for COVID 12/11

## 2023-12-16 NOTE — PROGRESS NOTES
[facility]    Medicine Progress Note - Hospitalist Service    Date of Admission:  12/6/2023    Assessment & Plan   nathaniel Thomas is a 78 year old male with past medical history significant for chronic hypoxic respiratory failure [has oxygen as needed at home], COPD, RUL lung mass s/p radiation, untreated BRENNEN, HTN, HLD, CAD, hypothyroidism admitted on 12/6/2023 with acute on chronic hypoxic respiratory failure likely 2/2 COPD exacerbation.      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  - He uses O2 at home as needed, but has using it much more recently.  - He has been trying nebs at home and did get a prescription for prednisone prior to admission  - Clinical picture is consistent with COPD exacerbation.  - He has poor air movement on pulmonary auscultation and diffuse expiratory wheezing.  - BNP and procalcitonin are within normal limits.  - COVID, influenza and RSV panel is negative.  - CXR showed just faint residual opacities in the RUL corresponding to RUL mass and post-treatment changes.  - He was initially placed on BiPAP in the ED for work of breathing, but has since been weaned off. He was also treated with duonebs, mag and methylprednisolone.    Continuous oximetry and supplemental O2 as needed   Initially on IV Solu-Medrol every 8 hours, but was switched to p.o. prednisone from 12/12/2023  Continue home trelegy Ellipta inhaler  Started treatment dose azithromycin (pt s on every other day dosing for prophylaxis).  Completed 5 days treatment dose, now back on QOD prophylactic dose.  Will need home oxygen evaluation prior to discharge  -Follows with Dr. Handy with pulmonology at [facility], last seen in September 2023.  - Family apparently asking about home BiPAP.  Placed consult to pulmonology for  recommendations regarding possible BiPAP at home, further COPD management given patient still significantly symptomatic from advanced COPD.  - CT chest done that was negative for PE, overall stable from last CT-no new significant findings.  -Change iv soulumedrol from BID to daily   - Also underwent sniff test this admission that was negative.  -- Of note he was on low-dose chronic prednisone PTA.  - Regarding possible home BiPAP-this will likely need to be accomplished as outpatient.  --Scheduled Duonebs  -Acetylcysteine nebs  -Currently on 1 liter oxygen   -Use ICS and flutter valve   -respiratory virus panel wnl   -Continue PTA Lasix 20 mg daily        Hypertension   Hyperlipidemia  Coronary artery disease   - Continue PTA amlodipine   - COntinue PTA ASA and rosuvastatin   - COntinue PTA metoprolol and amlodpine   - COntinue PTA lasix      Chronic low back pain   Constipation  Continue PTA oxycodone, gabapentin   - Pt is supposed to be getting lumbar MRI soon.  Can be done as an outpatient.  Hold naproxen for now in the setting of high-dose steroids since he would be at high risk for upper GI bleed and ulcers.  Oral PPI for prophylaxis  On bowel regimen for constipation   Flexeril to 10 mg 3 times daily PRN   Scheduled Tylenol  -lidocaine patches for better pain control  Gabapentin doses 400 mg TID for more even distribution of the dosage during the daytime     Hx of papillary thyroid cancer s/p thyroidectomy   Hypothyroidism   - Continue PTA levothyroxine      Insomnia   - Continue TPA Trazodone           Diet: Regular Diet Adult    DVT Prophylaxis: Pneumatic Compression Devices  Mir Catheter: Not present  Lines: None     Cardiac Monitoring: None  Code Status: No CPR- Do NOT Intubate      Clinically Significant Risk Factors              # Hypoalbuminemia: Lowest albumin = 3.4 g/dL at 12/8/2023 11:58 AM, will monitor as appropriate     # Hypertension: Noted on problem list        # Overweight: Estimated body  "mass index is 25.97 kg/m  as calculated from the following:    Height as of this encounter: 1.778 m (5' 10\").    Weight as of this encounter: 82.1 kg (181 lb).             Disposition Plan     Expected Discharge Date: 12/17/2023                    Nakul Burgos MD  Hospitalist Service  [facility]  Securely message with [employer] (more info)  Text page via [employer] Paging/Directory   ______________________________________________________________________    Interval History   Patient seen and examined at bedside.  He continues to be in mild respiratory distress.  He feels better since admission.  Still continues to feel short of breath.    Has been ambulating and walking around the unit    Using flutter valve    Discussed with patient's nurse Mateo and will encourage him to use \y incentive spirometer        Physical Exam   Vital Signs: Temp: 98.5  F (36.9  C) Temp src: Oral BP: 110/66 Pulse: 68   Resp: 18 SpO2: 97 % O2 Device: Nasal cannula Oxygen Delivery: 1 LPM  Weight: 180 lbs 15.96 oz    Physical Exam  Cardiovascular:      Rate and Rhythm: Normal rate and regular rhythm.      Heart sounds: Normal heart sounds.   Pulmonary:      Breath sounds: Wheezing present.      Comments: Mild respiratory distress.  Patient able to talk in full sentences  Abdominal:      General: There is no distension.      Palpations: Abdomen is soft.      Tenderness: There is no abdominal tenderness.   Neurological:      Mental Status: He is alert.          Medical Decision Making             Data     I have personally reviewed the following data over the past 24 hrs:    N/A  \   N/A   / N/A     N/A N/A N/A /  137 (H)   N/A N/A 0.81 \       Imaging results reviewed over the past 24 hrs:   No results found for this or any previous visit (from the past 24 hour(s)).  "

## 2023-12-17 ENCOUNTER — APPOINTMENT (OUTPATIENT)
Dept: PHYSICAL THERAPY | Facility: CLINIC | Age: 78
DRG: 190 | End: 2023-12-17
Payer: MEDICARE

## 2023-12-17 LAB
ANION GAP SERPL CALCULATED.3IONS-SCNC: 8 MMOL/L (ref 7–15)
BUN SERPL-MCNC: 19.1 MG/DL (ref 8–23)
CALCIUM SERPL-MCNC: 8.1 MG/DL (ref 8.8–10.2)
CHLORIDE SERPL-SCNC: 103 MMOL/L (ref 98–107)
CREAT SERPL-MCNC: 0.94 MG/DL (ref 0.67–1.17)
DEPRECATED HCO3 PLAS-SCNC: 29 MMOL/L (ref 22–29)
EGFRCR SERPLBLD CKD-EPI 2021: 84 ML/MIN/1.73M2
ERYTHROCYTE [DISTWIDTH] IN BLOOD BY AUTOMATED COUNT: 14.8 % (ref 10–15)
GLUCOSE BLDC GLUCOMTR-MCNC: 109 MG/DL (ref 70–99)
GLUCOSE BLDC GLUCOMTR-MCNC: 122 MG/DL (ref 70–99)
GLUCOSE BLDC GLUCOMTR-MCNC: 149 MG/DL (ref 70–99)
GLUCOSE SERPL-MCNC: 141 MG/DL (ref 70–99)
HCT VFR BLD AUTO: 41.8 % (ref 40–53)
HGB BLD-MCNC: 13.4 G/DL (ref 13.3–17.7)
MCH RBC QN AUTO: 30.2 PG (ref 26.5–33)
MCHC RBC AUTO-ENTMCNC: 32.1 G/DL (ref 31.5–36.5)
MCV RBC AUTO: 94 FL (ref 78–100)
PLATELET # BLD AUTO: 115 10E3/UL (ref 150–450)
POTASSIUM SERPL-SCNC: 4.3 MMOL/L (ref 3.4–5.3)
RBC # BLD AUTO: 4.44 10E6/UL (ref 4.4–5.9)
SODIUM SERPL-SCNC: 140 MMOL/L (ref 135–145)
WBC # BLD AUTO: 13.5 10E3/UL (ref 4–11)

## 2023-12-17 PROCEDURE — 250N000009 HC RX 250: Performed by: STUDENT IN AN ORGANIZED HEALTH CARE EDUCATION/TRAINING PROGRAM

## 2023-12-17 PROCEDURE — 250N000013 HC RX MED GY IP 250 OP 250 PS 637: Performed by: INTERNAL MEDICINE

## 2023-12-17 PROCEDURE — 250N000011 HC RX IP 250 OP 636: Mod: JZ | Performed by: STUDENT IN AN ORGANIZED HEALTH CARE EDUCATION/TRAINING PROGRAM

## 2023-12-17 PROCEDURE — 94640 AIRWAY INHALATION TREATMENT: CPT | Mod: 76

## 2023-12-17 PROCEDURE — 85027 COMPLETE CBC AUTOMATED: CPT | Performed by: STUDENT IN AN ORGANIZED HEALTH CARE EDUCATION/TRAINING PROGRAM

## 2023-12-17 PROCEDURE — 97116 GAIT TRAINING THERAPY: CPT | Mod: GP

## 2023-12-17 PROCEDURE — 99232 SBSQ HOSP IP/OBS MODERATE 35: CPT | Performed by: STUDENT IN AN ORGANIZED HEALTH CARE EDUCATION/TRAINING PROGRAM

## 2023-12-17 PROCEDURE — 36415 COLL VENOUS BLD VENIPUNCTURE: CPT | Performed by: STUDENT IN AN ORGANIZED HEALTH CARE EDUCATION/TRAINING PROGRAM

## 2023-12-17 PROCEDURE — 120N000001 HC R&B MED SURG/OB

## 2023-12-17 PROCEDURE — 80048 BASIC METABOLIC PNL TOTAL CA: CPT | Performed by: STUDENT IN AN ORGANIZED HEALTH CARE EDUCATION/TRAINING PROGRAM

## 2023-12-17 PROCEDURE — 250N000013 HC RX MED GY IP 250 OP 250 PS 637: Performed by: HOSPITALIST

## 2023-12-17 PROCEDURE — 999N000157 HC STATISTIC RCP TIME EA 10 MIN

## 2023-12-17 PROCEDURE — 250N000013 HC RX MED GY IP 250 OP 250 PS 637: Performed by: STUDENT IN AN ORGANIZED HEALTH CARE EDUCATION/TRAINING PROGRAM

## 2023-12-17 PROCEDURE — 97530 THERAPEUTIC ACTIVITIES: CPT | Mod: GP

## 2023-12-17 PROCEDURE — 94640 AIRWAY INHALATION TREATMENT: CPT

## 2023-12-17 RX ORDER — ACETYLCYSTEINE 200 MG/ML
4 SOLUTION ORAL; RESPIRATORY (INHALATION)
Status: CANCELLED | OUTPATIENT
Start: 2023-12-17

## 2023-12-17 RX ADMIN — FUROSEMIDE 20 MG: 20 TABLET ORAL at 08:30

## 2023-12-17 RX ADMIN — LEVOTHYROXINE SODIUM 125 MCG: 125 TABLET ORAL at 08:30

## 2023-12-17 RX ADMIN — INSULIN ASPART 1 UNITS: 100 INJECTION, SOLUTION INTRAVENOUS; SUBCUTANEOUS at 18:14

## 2023-12-17 RX ADMIN — SENNOSIDES 1 TABLET: 8.6 TABLET, FILM COATED ORAL at 08:30

## 2023-12-17 RX ADMIN — SENNOSIDES 1 TABLET: 8.6 TABLET, FILM COATED ORAL at 20:45

## 2023-12-17 RX ADMIN — DULOXETINE HYDROCHLORIDE 20 MG: 20 CAPSULE, DELAYED RELEASE ORAL at 20:45

## 2023-12-17 RX ADMIN — ACETAMINOPHEN 1000 MG: 500 TABLET, FILM COATED ORAL at 22:00

## 2023-12-17 RX ADMIN — GABAPENTIN 400 MG: 100 CAPSULE ORAL at 22:00

## 2023-12-17 RX ADMIN — OXYCODONE HYDROCHLORIDE 5 MG: 5 TABLET ORAL at 20:44

## 2023-12-17 RX ADMIN — CYCLOBENZAPRINE 10 MG: 10 TABLET, FILM COATED ORAL at 22:00

## 2023-12-17 RX ADMIN — TRAZODONE HYDROCHLORIDE 50 MG: 50 TABLET ORAL at 22:01

## 2023-12-17 RX ADMIN — CYCLOBENZAPRINE 10 MG: 10 TABLET, FILM COATED ORAL at 11:04

## 2023-12-17 RX ADMIN — OXYCODONE HYDROCHLORIDE 5 MG: 5 TABLET ORAL at 08:36

## 2023-12-17 RX ADMIN — ASPIRIN 81 MG: 81 TABLET, COATED ORAL at 08:30

## 2023-12-17 RX ADMIN — IPRATROPIUM BROMIDE AND ALBUTEROL SULFATE 3 ML: .5; 3 SOLUTION RESPIRATORY (INHALATION) at 19:03

## 2023-12-17 RX ADMIN — ACETYLCYSTEINE 4 ML: 200 SOLUTION ORAL; RESPIRATORY (INHALATION) at 19:03

## 2023-12-17 RX ADMIN — GABAPENTIN 400 MG: 100 CAPSULE ORAL at 08:30

## 2023-12-17 RX ADMIN — METHYLPREDNISOLONE SODIUM SUCCINATE 62.5 MG: 125 INJECTION, POWDER, FOR SOLUTION INTRAMUSCULAR; INTRAVENOUS at 06:06

## 2023-12-17 RX ADMIN — ASPIRIN 81 MG: 81 TABLET, COATED ORAL at 20:45

## 2023-12-17 RX ADMIN — AMLODIPINE BESYLATE 5 MG: 5 TABLET ORAL at 22:02

## 2023-12-17 RX ADMIN — ACETYLCYSTEINE 4 ML: 200 SOLUTION ORAL; RESPIRATORY (INHALATION) at 15:25

## 2023-12-17 RX ADMIN — PANTOPRAZOLE SODIUM 40 MG: 40 TABLET, DELAYED RELEASE ORAL at 06:07

## 2023-12-17 RX ADMIN — ACETAMINOPHEN 1000 MG: 500 TABLET, FILM COATED ORAL at 06:06

## 2023-12-17 RX ADMIN — ROSUVASTATIN CALCIUM 10 MG: 10 TABLET, FILM COATED ORAL at 20:45

## 2023-12-17 RX ADMIN — IPRATROPIUM BROMIDE AND ALBUTEROL SULFATE 3 ML: .5; 3 SOLUTION RESPIRATORY (INHALATION) at 15:25

## 2023-12-17 RX ADMIN — ACETYLCYSTEINE 4 ML: 200 SOLUTION ORAL; RESPIRATORY (INHALATION) at 07:45

## 2023-12-17 RX ADMIN — DULOXETINE HYDROCHLORIDE 20 MG: 20 CAPSULE, DELAYED RELEASE ORAL at 08:30

## 2023-12-17 RX ADMIN — ACETAMINOPHEN 1000 MG: 500 TABLET, FILM COATED ORAL at 13:36

## 2023-12-17 RX ADMIN — IPRATROPIUM BROMIDE AND ALBUTEROL SULFATE 3 ML: .5; 3 SOLUTION RESPIRATORY (INHALATION) at 07:45

## 2023-12-17 RX ADMIN — GABAPENTIN 400 MG: 100 CAPSULE ORAL at 17:06

## 2023-12-17 RX ADMIN — METOPROLOL SUCCINATE 50 MG: 50 TABLET, EXTENDED RELEASE ORAL at 20:45

## 2023-12-17 ASSESSMENT — ACTIVITIES OF DAILY LIVING (ADL)
ADLS_ACUITY_SCORE: 28

## 2023-12-17 NOTE — PROGRESS NOTES
[facility]    Medicine Progress Note - Hospitalist Service    Date of Admission:  12/6/2023    Assessment & Plan   nathaniel Thomas is a 78 year old male with past medical history significant for chronic hypoxic respiratory failure [has oxygen as needed at home], COPD, RUL lung mass s/p radiation, untreated BRENNEN, HTN, HLD, CAD, hypothyroidism admitted on 12/6/2023 with acute on chronic hypoxic respiratory failure likely 2/2 COPD exacerbation.      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  - He uses O2 at home as needed, but has using it much more recently.  - He has been trying nebs at home and did get a prescription for prednisone prior to admission  - Clinical picture is consistent with COPD exacerbation.  - He has poor air movement on pulmonary auscultation and diffuse expiratory wheezing.  - BNP and procalcitonin are within normal limits.  - COVID, influenza and RSV panel is negative.  - CXR showed just faint residual opacities in the RUL corresponding to RUL mass and post-treatment changes.  - He was initially placed on BiPAP in the ED for work of breathing, but has since been weaned off. He was also treated with duonebs, mag and methylprednisolone.    Continuous oximetry and supplemental O2 as needed   Initially on IV Solu-Medrol every 8 hours, but was switched to p.o. prednisone from 12/12/2023  Continue home trelegy Ellipta inhaler  Started treatment dose azithromycin (pt s on every other day dosing for prophylaxis).  Completed 5 days treatment dose, now back on QOD prophylactic dose.  Will need home oxygen evaluation prior to discharge  -Follows with Dr. Handy with pulmonology at [facility], last seen in September 2023.  - Family apparently asking about home BiPAP.  Placed consult to pulmonology for  recommendations regarding possible BiPAP at home, further COPD management given patient still significantly symptomatic from advanced COPD.  - CT chest done that was negative for PE, overall stable from last CT-no new significant findings.  -Seen by Dr Kaye from Pulmonology on 12/15/23  - Also underwent sniff test this admission that was negative.  -- Of note he was on low-dose chronic prednisone PTA.  - Regarding possible home BiPAP-this will likely need to be accomplished as outpatient.  --Scheduled Duonebs  -Acetylcysteine nebs  -Currently on 1 liter oxygen /Room Air   -Use ICS and flutter valve   -respiratory virus panel negative  -Continue PTA Lasix 20 mg daily  -Will need home oxygen evaluation at time of discharge  -Major transition to oral steriods in next few days        Hypertension   Hyperlipidemia  Coronary artery disease   - Continue PTA amlodipine   - COntinue PTA ASA and rosuvastatin   - COntinue PTA metoprolol and amlodpine   - COntinue PTA lasix      Chronic low back pain   Constipation  Continue PTA oxycodone, gabapentin   - Pt is supposed to be getting lumbar MRI soon.  Can be done as an outpatient.  Hold naproxen for now in the setting of high-dose steroids since he would be at high risk for upper GI bleed and ulcers.  Oral PPI for prophylaxis  On bowel regimen for constipation   Flexeril to 10 mg 3 times daily PRN   Scheduled Tylenol  -lidocaine patches for better pain control  Gabapentin doses 400 mg TID for more even distribution of the dosage during the daytime     Hx of papillary thyroid cancer s/p thyroidectomy   Hypothyroidism   - Continue PTA levothyroxine      Insomnia   - Continue TPA Trazodone           Diet: Regular Diet Adult    DVT Prophylaxis: Pneumatic Compression Devices  Mir Catheter: Not present  Lines: None     Cardiac Monitoring: None  Code Status: No CPR- Do NOT Intubate      Clinically Significant Risk Factors              # Hypoalbuminemia: Lowest albumin = 3.4 g/dL  "at 12/8/2023 11:58 AM, will monitor as appropriate   # Thrombocytopenia: Lowest platelets = 115 in last 2 days, will monitor for bleeding   # Hypertension: Noted on problem list        # Overweight: Estimated body mass index is 25.97 kg/m  as calculated from the following:    Height as of this encounter: 1.778 m (5' 10\").    Weight as of this encounter: 82.1 kg (181 lb).             Disposition Plan      Expected Discharge Date: 12/18/2023        Discharge Comments: pt is on iv solumedrol            Nakul Burgos MD  Hospitalist Service  [facility]  Securely message with [employer] (more info)  Text page via [employer] Paging/Directory   ______________________________________________________________________    Interval History   Patient seen and examined at bedside     Continuous to have mild respiratory distress    Encourage to use flutter valve    Having shortness of breath and wheezing               Physical Exam   Vital Signs: Temp: 98.1  F (36.7  C) Temp src: Oral BP: (!) 147/82 Pulse: 60   Resp: 16 SpO2: 95 % O2 Device: Nasal cannula Oxygen Delivery: 1 LPM  Weight: 180 lbs 15.96 oz  Physical Exam  Cardiovascular:      Rate and Rhythm: Normal rate and regular rhythm.      Heart sounds: Normal heart sounds.   Pulmonary:      Effort: Pulmonary effort is normal.      Breath sounds: Wheezing present.      Comments: Mild respiratory distress with wheezing  Abdominal:      General: There is no distension.      Palpations: Abdomen is soft.      Tenderness: There is no abdominal tenderness.             Medical Decision Making       45 MINUTES SPENT BY ME on the date of service doing chart review, history, exam, documentation & further activities per the note.      Data     I have personally reviewed the following data over the past 24 hrs:    13.5 (H)  \   13.4   / 115 (L)     140 103 19.1 /  122 (H)   4.3 29 0.94 \       Imaging results reviewed over the past 24 hrs:   No results found " for this or any previous visit (from the past 24 hour(s)).

## 2023-12-17 NOTE — PLAN OF CARE
Goal Outcome Evaluation:    Summary: Shortness of breath due to COPD exacerbation     DATE & TIME: 12/16-12/17 7125-5116  Cognitive Concerns/ Orientation: AOx4  BEHAVIOR & AGGRESSION TOOL COLOR: Green  ABNL VS/O2: VSS, 1 L via nasal cannula with O2 sats in mid 90s (using supplemental oxygen during naps and overnight- uses PRN O2 at home); Infrequent, non-productive cough; Expiratory wheezing with scheduled nebs   MOBILITY: Independent, cane  PAIN MANAGMENT: PRN Flexeril, Oxy x1 given for back pain with relief.   DIET: Regular  BOWEL/BLADDER: Continent. Using bathroom. Held stool softeners per pt request   ABNORMAL LABS:   DRAIN/DEVICES: PIV SL  TELEMETRY RHYTHM: NA  SKIN: Scattered bruising and scabs.  TESTS/PROCEDURES: AM labs   D/C DAY/GOALS/PLACE: Home with homecare pending clinical improvement  OTHER IMPORTANT INFO: Pulmonology following.

## 2023-12-17 NOTE — PLAN OF CARE
Goal Outcome Evaluation:    COPD ex, pneumonitis. Complaining of abd bloating, asymptomatic. MD aware. Waiting on pulm rec. On 1L NC. A&Ox4. Ind in room with cane. Regular diet. BS checks for the steroid injections. Expiratory wheezes, productive cough. Scheduled nebs, RT following. Discharge with home care once ready for discharge. [facility] has accepted.

## 2023-12-17 NOTE — PLAN OF CARE
Goal Outcome Evaluation:      Plan of Care Reviewed With: patient     Summary: Shortness of breath due to COPD exacerbation     DATE & TIME: 12/16/23 5894-1386  Cognitive Concerns/ Orientation: AOx4  BEHAVIOR & AGGRESSION TOOL COLOR: Green  ABNL VS/O2: VSS, 1 L via nasal cannula with O2 sats in mid 90s (using supplemental oxygen during naps and overnight- uses PRN O2 at home); Infrequent, non-productive cough; Expiratory wheezing with scheduled nebs   MOBILITY: Independent, cane  PAIN MANAGMENT: stated he had some back pain but refused interventions at this time, received scheduled Tylenol and gabapentin    DIET: Regular  BOWEL/BLADDER: Continent. Using bathroom. Held stool softeners per pt request   ABNORMAL LABS: ,180,124  DRAIN/DEVICES: PIV SL  TELEMETRY RHYTHM: NA  SKIN: Scattered bruising and scabs.  TESTS/PROCEDURES: AM labs   D/C DAY/GOALS/PLACE: Home with homecare pending clinical improvement  OTHER IMPORTANT INFO: Pulmonology following

## 2023-12-18 ENCOUNTER — APPOINTMENT (OUTPATIENT)
Dept: GENERAL RADIOLOGY | Facility: CLINIC | Age: 78
DRG: 190 | End: 2023-12-18
Attending: STUDENT IN AN ORGANIZED HEALTH CARE EDUCATION/TRAINING PROGRAM
Payer: MEDICARE

## 2023-12-18 ENCOUNTER — APPOINTMENT (OUTPATIENT)
Dept: GENERAL RADIOLOGY | Facility: CLINIC | Age: 78
DRG: 190 | End: 2023-12-18
Attending: HOSPITALIST
Payer: MEDICARE

## 2023-12-18 LAB
ALLEN'S TEST: YES
ANION GAP SERPL CALCULATED.3IONS-SCNC: 9 MMOL/L (ref 7–15)
BASE EXCESS BLDA CALC-SCNC: 7 MMOL/L (ref -9–1.8)
BUN SERPL-MCNC: 15.3 MG/DL (ref 8–23)
CALCIUM SERPL-MCNC: 8.4 MG/DL (ref 8.8–10.2)
CHLORIDE SERPL-SCNC: 102 MMOL/L (ref 98–107)
CREAT SERPL-MCNC: 0.79 MG/DL (ref 0.67–1.17)
DEPRECATED HCO3 PLAS-SCNC: 30 MMOL/L (ref 22–29)
EGFRCR SERPLBLD CKD-EPI 2021: >90 ML/MIN/1.73M2
ERYTHROCYTE [DISTWIDTH] IN BLOOD BY AUTOMATED COUNT: 14.7 % (ref 10–15)
GLUCOSE BLDC GLUCOMTR-MCNC: 146 MG/DL (ref 70–99)
GLUCOSE BLDC GLUCOMTR-MCNC: 154 MG/DL (ref 70–99)
GLUCOSE SERPL-MCNC: 159 MG/DL (ref 70–99)
HCO3 BLD-SCNC: 32 MMOL/L (ref 21–28)
HCT VFR BLD AUTO: 42.2 % (ref 40–53)
HGB BLD-MCNC: 13.3 G/DL (ref 13.3–17.7)
MCH RBC QN AUTO: 30 PG (ref 26.5–33)
MCHC RBC AUTO-ENTMCNC: 31.5 G/DL (ref 31.5–36.5)
MCV RBC AUTO: 95 FL (ref 78–100)
O2/TOTAL GAS SETTING VFR VENT: 2 %
PCO2 BLD: 46 MM HG (ref 35–45)
PH BLD: 7.45 [PH] (ref 7.35–7.45)
PLATELET # BLD AUTO: 119 10E3/UL (ref 150–450)
PO2 BLD: 119 MM HG (ref 80–105)
POTASSIUM SERPL-SCNC: 4.4 MMOL/L (ref 3.4–5.3)
RBC # BLD AUTO: 4.43 10E6/UL (ref 4.4–5.9)
SODIUM SERPL-SCNC: 141 MMOL/L (ref 135–145)
WBC # BLD AUTO: 15.9 10E3/UL (ref 4–11)

## 2023-12-18 PROCEDURE — 250N000013 HC RX MED GY IP 250 OP 250 PS 637: Performed by: HOSPITALIST

## 2023-12-18 PROCEDURE — 999N000157 HC STATISTIC RCP TIME EA 10 MIN

## 2023-12-18 PROCEDURE — 83880 ASSAY OF NATRIURETIC PEPTIDE: CPT | Performed by: HOSPITALIST

## 2023-12-18 PROCEDURE — 250N000013 HC RX MED GY IP 250 OP 250 PS 637: Performed by: INTERNAL MEDICINE

## 2023-12-18 PROCEDURE — 99232 SBSQ HOSP IP/OBS MODERATE 35: CPT | Performed by: INTERNAL MEDICINE

## 2023-12-18 PROCEDURE — 36600 WITHDRAWAL OF ARTERIAL BLOOD: CPT

## 2023-12-18 PROCEDURE — 85014 HEMATOCRIT: CPT | Performed by: STUDENT IN AN ORGANIZED HEALTH CARE EDUCATION/TRAINING PROGRAM

## 2023-12-18 PROCEDURE — 94640 AIRWAY INHALATION TREATMENT: CPT

## 2023-12-18 PROCEDURE — 250N000011 HC RX IP 250 OP 636: Mod: JZ | Performed by: STUDENT IN AN ORGANIZED HEALTH CARE EDUCATION/TRAINING PROGRAM

## 2023-12-18 PROCEDURE — 120N000001 HC R&B MED SURG/OB

## 2023-12-18 PROCEDURE — 36415 COLL VENOUS BLD VENIPUNCTURE: CPT | Performed by: STUDENT IN AN ORGANIZED HEALTH CARE EDUCATION/TRAINING PROGRAM

## 2023-12-18 PROCEDURE — 250N000009 HC RX 250: Performed by: STUDENT IN AN ORGANIZED HEALTH CARE EDUCATION/TRAINING PROGRAM

## 2023-12-18 PROCEDURE — 99233 SBSQ HOSP IP/OBS HIGH 50: CPT | Performed by: HOSPITALIST

## 2023-12-18 PROCEDURE — 94640 AIRWAY INHALATION TREATMENT: CPT | Mod: 76

## 2023-12-18 PROCEDURE — 250N000013 HC RX MED GY IP 250 OP 250 PS 637: Performed by: STUDENT IN AN ORGANIZED HEALTH CARE EDUCATION/TRAINING PROGRAM

## 2023-12-18 PROCEDURE — 74018 RADEX ABDOMEN 1 VIEW: CPT

## 2023-12-18 PROCEDURE — 80048 BASIC METABOLIC PNL TOTAL CA: CPT | Performed by: STUDENT IN AN ORGANIZED HEALTH CARE EDUCATION/TRAINING PROGRAM

## 2023-12-18 PROCEDURE — 71045 X-RAY EXAM CHEST 1 VIEW: CPT

## 2023-12-18 PROCEDURE — 82803 BLOOD GASES ANY COMBINATION: CPT | Performed by: INTERNAL MEDICINE

## 2023-12-18 RX ORDER — AMOXICILLIN 250 MG
2 CAPSULE ORAL 2 TIMES DAILY
Status: DISCONTINUED | OUTPATIENT
Start: 2023-12-18 | End: 2024-01-10 | Stop reason: HOSPADM

## 2023-12-18 RX ADMIN — GABAPENTIN 400 MG: 100 CAPSULE ORAL at 21:19

## 2023-12-18 RX ADMIN — SENNOSIDES 2 TABLET: 8.6 TABLET, FILM COATED ORAL at 08:13

## 2023-12-18 RX ADMIN — AZITHROMYCIN DIHYDRATE 500 MG: 250 TABLET ORAL at 08:13

## 2023-12-18 RX ADMIN — IPRATROPIUM BROMIDE AND ALBUTEROL SULFATE 3 ML: .5; 3 SOLUTION RESPIRATORY (INHALATION) at 19:09

## 2023-12-18 RX ADMIN — ASPIRIN 81 MG: 81 TABLET, COATED ORAL at 08:13

## 2023-12-18 RX ADMIN — METHYLPREDNISOLONE SODIUM SUCCINATE 62.5 MG: 125 INJECTION, POWDER, FOR SOLUTION INTRAMUSCULAR; INTRAVENOUS at 05:33

## 2023-12-18 RX ADMIN — ACETAMINOPHEN 1000 MG: 500 TABLET, FILM COATED ORAL at 05:32

## 2023-12-18 RX ADMIN — DOCUSATE SODIUM 50 MG AND SENNOSIDES 8.6 MG 2 TABLET: 8.6; 5 TABLET, FILM COATED ORAL at 21:19

## 2023-12-18 RX ADMIN — LIDOCAINE 2 PATCH: 4 PATCH TOPICAL at 08:12

## 2023-12-18 RX ADMIN — DULOXETINE HYDROCHLORIDE 20 MG: 20 CAPSULE, DELAYED RELEASE ORAL at 21:19

## 2023-12-18 RX ADMIN — METOPROLOL SUCCINATE 50 MG: 50 TABLET, EXTENDED RELEASE ORAL at 21:19

## 2023-12-18 RX ADMIN — DULOXETINE HYDROCHLORIDE 20 MG: 20 CAPSULE, DELAYED RELEASE ORAL at 08:13

## 2023-12-18 RX ADMIN — GABAPENTIN 400 MG: 100 CAPSULE ORAL at 18:55

## 2023-12-18 RX ADMIN — ACETAMINOPHEN 1000 MG: 500 TABLET, FILM COATED ORAL at 21:19

## 2023-12-18 RX ADMIN — AMLODIPINE BESYLATE 5 MG: 5 TABLET ORAL at 21:19

## 2023-12-18 RX ADMIN — ACETAMINOPHEN 1000 MG: 500 TABLET, FILM COATED ORAL at 13:34

## 2023-12-18 RX ADMIN — CYCLOBENZAPRINE 10 MG: 10 TABLET, FILM COATED ORAL at 22:07

## 2023-12-18 RX ADMIN — INSULIN ASPART 1 UNITS: 100 INJECTION, SOLUTION INTRAVENOUS; SUBCUTANEOUS at 12:16

## 2023-12-18 RX ADMIN — LEVOTHYROXINE SODIUM 125 MCG: 125 TABLET ORAL at 08:13

## 2023-12-18 RX ADMIN — IPRATROPIUM BROMIDE AND ALBUTEROL SULFATE 3 ML: .5; 3 SOLUTION RESPIRATORY (INHALATION) at 15:00

## 2023-12-18 RX ADMIN — GABAPENTIN 400 MG: 100 CAPSULE ORAL at 08:13

## 2023-12-18 RX ADMIN — INSULIN ASPART 1 UNITS: 100 INJECTION, SOLUTION INTRAVENOUS; SUBCUTANEOUS at 08:14

## 2023-12-18 RX ADMIN — TRAZODONE HYDROCHLORIDE 50 MG: 50 TABLET ORAL at 21:19

## 2023-12-18 RX ADMIN — OXYCODONE HYDROCHLORIDE 5 MG: 5 TABLET ORAL at 22:07

## 2023-12-18 RX ADMIN — ASPIRIN 81 MG: 81 TABLET, COATED ORAL at 21:19

## 2023-12-18 RX ADMIN — ROSUVASTATIN CALCIUM 10 MG: 10 TABLET, FILM COATED ORAL at 21:19

## 2023-12-18 RX ADMIN — IPRATROPIUM BROMIDE AND ALBUTEROL SULFATE 3 ML: .5; 3 SOLUTION RESPIRATORY (INHALATION) at 08:02

## 2023-12-18 RX ADMIN — INSULIN ASPART 1 UNITS: 100 INJECTION, SOLUTION INTRAVENOUS; SUBCUTANEOUS at 18:55

## 2023-12-18 RX ADMIN — FUROSEMIDE 20 MG: 20 TABLET ORAL at 08:13

## 2023-12-18 RX ADMIN — IPRATROPIUM BROMIDE AND ALBUTEROL SULFATE 3 ML: .5; 3 SOLUTION RESPIRATORY (INHALATION) at 11:55

## 2023-12-18 RX ADMIN — PANTOPRAZOLE SODIUM 40 MG: 40 TABLET, DELAYED RELEASE ORAL at 05:32

## 2023-12-18 ASSESSMENT — ACTIVITIES OF DAILY LIVING (ADL)
ADLS_ACUITY_SCORE: 28
ADLS_ACUITY_SCORE: 30
ADLS_ACUITY_SCORE: 28
ADLS_ACUITY_SCORE: 28
ADLS_ACUITY_SCORE: 30
ADLS_ACUITY_SCORE: 28

## 2023-12-18 NOTE — PLAN OF CARE
Goal Outcome Evaluation:  DATE & TIME: 12/18 0700- 1530  Cognitive Concerns/ Orientation: AOx4  BEHAVIOR & AGGRESSION TOOL COLOR: Green  ABNL VS/O2: VSS on RA 1-2 L via nasal cannula with O2 sats in mid 90s (- uses PRN O2 at home); Infrequent, congested  cough; Expiratory wheezing with scheduled nebs , ACEVES and tachypnic.  MOBILITY: Independent with cane  PAIN MANAGMENT: on scheduled Tylenol and lidocaine patch for low back pain, no PRNs this shift.  DIET: Regular  BOWEL/BLADDER: Continent   ABNORMAL LABS. , 154, WBC 15.9, pending Blood gases arterial.  DRAIN/DEVICES: PIV SL  TELEMETRY RHYTHM: NA  SKIN: Scattered bruising and scabs.  TESTS/PROCEDURES: AM labs , repeat chest x ray.  D/C DAY/GOALS/PLACE: Home with homecare pending clinical improvement  OTHER IMPORTANT INFO: Pulmonology following.

## 2023-12-18 NOTE — PLAN OF CARE
Goal Outcome Evaluation:    Summary: Shortness of breath due to COPD exacerbation     DATE & TIME: 12/17 1500-1930   Cognitive Concerns/ Orientation: AOx4  BEHAVIOR & AGGRESSION TOOL COLOR: Green  ABNL VS/O2: VSS on RA 1 L via nasal cannula with O2 sats in mid 90s (using supplemental oxygen during naps and overnight- uses PRN O2 at home); Infrequent, non-productive cough; Expiratory wheezing with scheduled nebs   MOBILITY: Independent, cane  PAIN MANAGMENT: denies  DIET: Regular  BOWEL/BLADDER: Continent. ABNORMAL LABS:   DRAIN/DEVICES: PIV SL  TELEMETRY RHYTHM: NA  SKIN: Scattered bruising and scabs.  TESTS/PROCEDURES: AM labs   D/C DAY/GOALS/PLACE: Home with homecare pending clinical improvement  OTHER IMPORTANT INFO: Pulmonology following.

## 2023-12-18 NOTE — PROGRESS NOTES
[facility]    Medicine Progress Note - Hospitalist Service    Date of Admission:  12/6/2023    Assessment & Plan   nathaniel Thomas is a 78 year old male with past medical history significant for chronic hypoxic respiratory failure [has oxygen as needed at home], COPD, RUL lung mass s/p radiation, untreated BRENNEN, HTN, HLD, CAD, hypothyroidism admitted on 12/6/2023 with acute on chronic hypoxic respiratory failure likely 2/2 COPD exacerbation.      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  - He uses O2 at home as needed, but has using it much more recently.  - He has been trying nebs at home and did get a prescription for prednisone prior to admission  - Clinical picture is consistent with COPD exacerbation.  - He has poor air movement on pulmonary auscultation and diffuse expiratory wheezing.  - BNP and procalcitonin are within normal limits.  - COVID, influenza and RSV panel is negative.  - CXR showed just faint residual opacities in the RUL corresponding to RUL mass and post-treatment changes.  - He was initially placed on BiPAP in the ED for work of breathing, but has since been weaned off. He was also treated with duonebs, mag and methylprednisolone.    Continuous oximetry and supplemental O2 as needed   Initially on IV Solu-Medrol every 8 hours, but was switched to p.o. prednisone from 12/12/2023  Continue home trelegy Ellipta inhaler  Started treatment dose azithromycin (pt s on every other day dosing for prophylaxis).  Completed 5 days treatment dose, now back on QOD prophylactic dose.  Will need home oxygen evaluation prior to discharge  -Follows with Dr. Handy with pulmonology at [facility], last seen in September 2023.  - Family apparently asking about home BiPAP.  Placed consult to pulmonology for  recommendations regarding possible BiPAP at home, further COPD management given patient still significantly symptomatic from advanced COPD.  - CT chest done that was negative for PE, overall stable from last CT-no new significant findings.  - Also underwent sniff test this admission that was negative.  -- Of note he was on low-dose chronic prednisone PTA.  - Regarding possible home BiPAP-this will likely need to be accomplished as outpatient.  --Scheduled Duonebs  -Acetylcysteine nebs   -Currently on 1 liter oxygen /Room Air   -Use ICS and flutter valve   -respiratory virus panel negative  -Continue PTA Lasix 20 mg daily  -Will need home oxygen evaluation at time of discharge  -Major transition to oral steriods in next few days  -Seen again by pulmonologist on 12/18.  He had repeat chest x-ray that showed increased right upper lobe consolidation compared to prior.  Per pulmonology-at this time COPD does not qualify him for home positive pressure therapy but his history of BRENNEN would likely qualify him-would need a repeat sleep study as outpatient as last 1 was in 2018.  Follow-up with sleep medicine after discharge.        Hypertension   Hyperlipidemia  Coronary artery disease   - Continue PTA amlodipine   - COntinue PTA ASA and rosuvastatin   - COntinue PTA metoprolol and amlodpine   - COntinue PTA lasix      Chronic low back pain   Constipation  Continue PTA oxycodone, gabapentin   - Pt is supposed to be getting lumbar MRI soon.  Can be done as an outpatient.  Hold naproxen for now in the setting of high-dose steroids since he would be at high risk for upper GI bleed and ulcers.  Oral PPI for prophylaxis  On bowel regimen for constipation   Flexeril to 10 mg 3 times daily PRN   Scheduled Tylenol  -lidocaine patches for better pain control  Gabapentin doses 400 mg TID for more even distribution of the dosage during the daytime  Given concern for abdominal distention, will obtain abdominal x-ray on 12/18.  Though  "patient does report bowel movement the previous day but nothing documented in the chart.     Hx of papillary thyroid cancer s/p thyroidectomy   Hypothyroidism   - Continue PTA levothyroxine      Insomnia   - Continue TPA Trazodone           Diet: Regular Diet Adult    DVT Prophylaxis: Pneumatic Compression Devices  Mir Catheter: Not present  Lines: None     Cardiac Monitoring: None  Code Status: No CPR- Do NOT Intubate      Clinically Significant Risk Factors              # Hypoalbuminemia: Lowest albumin = 3.4 g/dL at 12/8/2023 11:58 AM, will monitor as appropriate   # Thrombocytopenia: Lowest platelets = 115 in last 2 days, will monitor for bleeding   # Hypertension: Noted on problem list        # Overweight: Estimated body mass index is 26.32 kg/m  as calculated from the following:    Height as of this encounter: 1.778 m (5' 10\").    Weight as of this encounter: 83.2 kg (183 lb 6.4 oz).             Disposition Plan      Expected Discharge Date: 12/19/2023        Discharge Comments: pt is on iv solumedrol        Spent 50 minutes on patient encounter: Multiple discussions with RN, patient and his family, reviewing chart, going over prognosis and management.    Ashley Langley MD  Hospitalist Service  [facility]  Securely message with [employer] (more info)  Text page via [employer] Paging/Directory   ______________________________________________________________________    Interval History   Patient known to me as I had taken care of him about a week ago.  He is on minimal amount of oxygen but still remains significantly dyspneic, has audible wheezing even without stethoscope.  Gets significantly winded with minimal exertion.  He does not feel any better over the last few days.  Daughter thinks that he is actually worse.  Spoke at length with daughter on phone at bedside.  She also was concerned that patient appears more bloated in his chest and abdomen.  He did have a bowel movement yesterday " and does not feel constipated.  He does not have any leg swelling.  He denies any abdominal pain.  No chills or sweats.        Physical Exam   Vital Signs: Temp: 98  F (36.7  C) Temp src: Oral BP: 139/61 Pulse: 67   Resp: 20 SpO2: 96 % O2 Device: Nasal cannula Oxygen Delivery: 2 LPM  Weight: 183 lbs 6.4 oz    General: Alert, cooperative, dyspneic with minimal activity, reclined in bed  Respiratory: Breathing appeared  labored, poor air movement bilaterally, scattered expiratory wheezes  Cardiac: RRR, no edema  Abdomen: Soft, nontender, distended, bowel sounds heard  MSK: Moving all extremities  Neuro: Alert, oriented, no facial asymmetry, fluent speech  Psych: Appears a bit anxious, breathing appeared labored       Medical Decision Making       55 MINUTES SPENT BY ME on the date of service doing chart review, history, exam, documentation & further activities per the note.      Data     I have personally reviewed the following data over the past 24 hrs:    15.9 (H)  \   13.3   / 119 (L)     141 102 15.3 /  159 (H)   4.4 30 (H) 0.79 \       Imaging results reviewed over the past 24 hrs:   Recent Results (from the past 24 hour(s))   XR Chest Port 1 View    Narrative    CHEST ONE VIEW  12/18/2023 8:26 AM     HISTORY: COPD, mass.    COMPARISON: December 6, 2023      Impression    IMPRESSION: Increased consolidation in the right upper lobe since  comparison. Lungs otherwise clear.     RANDELL LOTT MD         SYSTEM ID:  [id number]

## 2023-12-18 NOTE — PROGRESS NOTES
[facility]    Pulmonary, Allergy, Critical Care and Sleep Medicine    Pulmonary Consult Progress Note    Harrison Thomas MRN# [id number]   Age: 78 year old YOB: 1945     Date of Admission: 12/6/2023  Date of Service: 12/18/2023     ==================================================  INTERVAL EVENTS:  -Alternating between room air and 1 L nasal cannula with significant desaturation on exertion and significant dyspnea even at rest.  Reports that he does not feel that he has really made any improvement while being here  -Remains afebrile  -White count trending down on last check, up a bit today  -Respiratory viral panel negative  -Receiving prednisone 60 mg 3 times daily until Saturday, then changed to 60 mg of prednisone daily  -Repeat chest x-ray today with increased right upper lobe consolidation  -Complains of significant abdominal distention which is limiting his breathing      CHANGES FOR TODAY:  -I's and O's monitoring ordered  -Daily weights ordered  -Check ABG for hypercarbia given his symptomatic dyspnea in the setting of adequate oxygenation  -Consider increasing his bowel regimen given his distended abdomen may be compressing his diaphragm      ==================================================    ASSESSMENT AND RECOMMENDATIONS:    Harrison Thomas is a 78 year old male with a history of Moderate COPD, 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     ## Acute Hypoxic Respiratory Failure  ## Moderate COPD with Exacerbation  ## Right upper lobe lung mass status post radiation treatment with possible chronic radiation changes versus radiation pneumonitis  ## Seropositive RA (previously on methotrexate and prednisone, now off)  ## Moderate sleep apnea (AHI 20 in 2018)    History of right upper  "lobe mass without clear etiology after bronchoscopy and VATS biopsy.  Underwent empiric radiation therapy in mid 2022.      Admitted following increased home use of oxygen and rescue therapies. Initially required 5L but has weaned down to minimal to no oxygen use.  BNP and procalcitonin negative on admission.  Chest imaging largely stable from prior with some bronchial wall thickening and chronic right upper lobe consolidation which was initially stable, but then increased on 12/18  Ongoing severe wheezing with dependence on steroids. Scheduled bronchodilators, mucolytic and flutter valve given wheezing and report of difficulty clearing some sputum.  Respiratory viral panel negative.  Sniff test within normal limits.    At this time it does not appear that his COPD qualifies him for home positive pressure therapy, however he does have a history of BRENNEN which would qualify him, though his last sleep study was in 2018 so this will likely need to be repeated.    -Follow-up with sleep medicine after discharge  - Duonebs with Mucomyst QID  - Flutter valve following nebs QID  -Agree with p.o. steroids  -Consider increasing bowel regimen in the setting of distended abdomen which may be compressing his diaphragm        Aidan Mcneil M.D.  Pulmonary & Critical Care  Pager: Click Here to page      I spent 45 minutes dedicated to this care so far today excluding procedures, including review of medical records, review of imaging (results & images), time with patient and time in documentation.    ==================================================      PHYSICAL EXAM  /73 (BP Location: Right arm)   Pulse 69   Temp 97.7  F (36.5  C) (Oral)   Resp 16   Ht 1.778 m (5' 10\")   Wt 82.1 kg (181 lb)   SpO2 97%   BMI 25.97 kg/m        Intake/Output Summary (Last 24 hours) at 12/18/2023 0948  Last data filed at 12/18/2023 0500  Gross per 24 hour   Intake 240 ml   Output --   Net 240 ml       Vitals:    12/06/23 1752 " "12/06/23 2200   Weight: 79.4 kg (175 lb) 82.1 kg (181 lb)         General: Alert, interactive, dyspneic at rest but not particularly distressed  Resp: Predominant wheezing throughout  Cardiac: RRR, NS1,S2, No m/r/g  Abdomen: Firm (but not rigid), distended, tympanic to percussion with active bowel sounds  Extremities: Trace lower extremity edema  Skin: Warm and dry, no jaundice or rash  Neuro: Alert & oriented x 3, Cns 2-12 intact, moves all extremities equally      CT chest 12/18  Increased consolidation in the right upper lobe since  comparison. Lungs otherwise clear.     Recent Labs   Lab Test 12/17/23  0839 12/16/23  1112 12/08/23  1158   WBC 13.5* 14.9* 13.6*   RBC 4.44 4.37* 4.47   HGB 13.4 13.3 13.7   * 134* 150       Recent Labs   Lab Test 12/18/23  0742 12/17/23  1705 12/17/23  1109 12/17/23  0839 12/17/23  0154 12/16/23  1112 12/15/23  1711 12/15/23  1253 12/08/23  1321 12/08/23  1158 12/07/23  1652 12/06/23  1801 06/17/23  0814 06/17/23  0704 06/16/23  1217 06/16/23  0707   NA  --   --   --  140  --  138  --   --   --  141  --  139   < > 138  --  140   POTASSIUM  --   --   --  4.3  --  4.0  --   --   --  4.5  --  4.3   < > 4.2  --  4.2   CHLORIDE  --   --   --  103  --  101  --   --   --  106  --  102   < > 103  --  106   CO2  --   --   --  29  --  27  --   --   --  26  --  22   < > 28  --  27   BUN  --   --   --  19.1  --  22.7  --   --   --  25.0*  --  17.6   < > 11.6  --  14.3   CR  --   --   --  0.94  --  1.03  --  0.81  --  0.89  --  0.87   < > 0.67  --  0.72   * 149* 122* 141*   < > 190*   < >  --    < > 172*   < > 213*   < > 91   < > 89   KARLIE  --   --   --  8.1*  --  8.0*  --   --   --  8.8  --  9.5   < > 8.6*  --  8.3*   MAG  --   --   --   --   --   --   --   --   --   --   --  2.3  --  1.9  --  2.0    < > = values in this interval not displayed.           No results for input(s): \"CULT\" in the last 168 hours.      Recent Results (from the past 48 hour(s))   XR Chest Port 1 View    " Narrative    CHEST ONE VIEW  12/18/2023 8:26 AM     HISTORY: COPD, mass.    COMPARISON: December 6, 2023      Impression    IMPRESSION: Increased consolidation in the right upper lobe since  comparison. Lungs otherwise clear.     RANDELL LOTT MD         SYSTEM ID:  [id number]

## 2023-12-18 NOTE — PLAN OF CARE
Goal Outcome Evaluation:      DATE & TIME: 12/17 1500-1930   Cognitive Concerns/ Orientation: AOx4  BEHAVIOR & AGGRESSION TOOL COLOR: Green  ABNL VS/O2: VSS on RA 1 L via nasal cannula w/ O2 sats in 94-97s (using supplemental oxygen during naps and overnight- uses PRN O2 at home); Infrequent, non-productive cough; Expiratory wheezing w/ scheduled nebs   MOBILITY: Independent, w/ cane  PAIN MANAGMENT: oxycodone x1, scheduled tylenol and flexeril   DIET: Regular  BOWEL/BLADDER: Continent. ABNORMAL LABS: @10pm, 116 @2am  DRAIN/DEVICES: PIV SL  TELEMETRY RHYTHM: NA  SKIN: Scattered bruising and scabs.  TESTS/PROCEDURES: AM labs   D/C DAY/GOALS/PLACE: Home with homecare pending clinical improvement    OTHER IMPORTANT INFO: Pulmonology following.

## 2023-12-19 ENCOUNTER — APPOINTMENT (OUTPATIENT)
Dept: PHYSICAL THERAPY | Facility: CLINIC | Age: 78
DRG: 190 | End: 2023-12-19
Payer: MEDICARE

## 2023-12-19 LAB
GLUCOSE BLDC GLUCOMTR-MCNC: 109 MG/DL (ref 70–99)
GLUCOSE BLDC GLUCOMTR-MCNC: 178 MG/DL (ref 70–99)
GLUCOSE BLDC GLUCOMTR-MCNC: 222 MG/DL (ref 70–99)
GLUCOSE BLDC GLUCOMTR-MCNC: 93 MG/DL (ref 70–99)
NT-PROBNP SERPL-MCNC: 515 PG/ML (ref 0–1800)

## 2023-12-19 PROCEDURE — 94640 AIRWAY INHALATION TREATMENT: CPT

## 2023-12-19 PROCEDURE — 250N000011 HC RX IP 250 OP 636: Mod: JZ | Performed by: STUDENT IN AN ORGANIZED HEALTH CARE EDUCATION/TRAINING PROGRAM

## 2023-12-19 PROCEDURE — 250N000013 HC RX MED GY IP 250 OP 250 PS 637: Performed by: STUDENT IN AN ORGANIZED HEALTH CARE EDUCATION/TRAINING PROGRAM

## 2023-12-19 PROCEDURE — 250N000013 HC RX MED GY IP 250 OP 250 PS 637: Performed by: INTERNAL MEDICINE

## 2023-12-19 PROCEDURE — 250N000009 HC RX 250: Performed by: STUDENT IN AN ORGANIZED HEALTH CARE EDUCATION/TRAINING PROGRAM

## 2023-12-19 PROCEDURE — 120N000001 HC R&B MED SURG/OB

## 2023-12-19 PROCEDURE — 99232 SBSQ HOSP IP/OBS MODERATE 35: CPT | Performed by: INTERNAL MEDICINE

## 2023-12-19 PROCEDURE — 94640 AIRWAY INHALATION TREATMENT: CPT | Mod: 76

## 2023-12-19 PROCEDURE — 99232 SBSQ HOSP IP/OBS MODERATE 35: CPT | Performed by: HOSPITALIST

## 2023-12-19 PROCEDURE — 999N000157 HC STATISTIC RCP TIME EA 10 MIN

## 2023-12-19 PROCEDURE — 250N000013 HC RX MED GY IP 250 OP 250 PS 637: Performed by: HOSPITALIST

## 2023-12-19 PROCEDURE — 97116 GAIT TRAINING THERAPY: CPT | Mod: GP | Performed by: PHYSICAL THERAPIST

## 2023-12-19 RX ORDER — PREDNISONE 20 MG/1
40 TABLET ORAL DAILY
Status: DISCONTINUED | OUTPATIENT
Start: 2023-12-20 | End: 2023-12-27

## 2023-12-19 RX ORDER — POLYETHYLENE GLYCOL 3350 17 G/17G
17 POWDER, FOR SOLUTION ORAL 2 TIMES DAILY
Status: DISCONTINUED | OUTPATIENT
Start: 2023-12-19 | End: 2024-01-10 | Stop reason: HOSPADM

## 2023-12-19 RX ORDER — MAGNESIUM CARB/ALUMINUM HYDROX 105-160MG
296 TABLET,CHEWABLE ORAL DAILY PRN
Status: DISCONTINUED | OUTPATIENT
Start: 2023-12-19 | End: 2024-01-10 | Stop reason: HOSPADM

## 2023-12-19 RX ORDER — BISACODYL 10 MG
10 SUPPOSITORY, RECTAL RECTAL DAILY PRN
Status: DISCONTINUED | OUTPATIENT
Start: 2023-12-19 | End: 2024-01-10 | Stop reason: HOSPADM

## 2023-12-19 RX ADMIN — PANTOPRAZOLE SODIUM 40 MG: 40 TABLET, DELAYED RELEASE ORAL at 06:20

## 2023-12-19 RX ADMIN — IPRATROPIUM BROMIDE AND ALBUTEROL SULFATE 3 ML: .5; 3 SOLUTION RESPIRATORY (INHALATION) at 15:37

## 2023-12-19 RX ADMIN — FUROSEMIDE 20 MG: 20 TABLET ORAL at 08:26

## 2023-12-19 RX ADMIN — DULOXETINE HYDROCHLORIDE 20 MG: 20 CAPSULE, DELAYED RELEASE ORAL at 20:40

## 2023-12-19 RX ADMIN — GABAPENTIN 400 MG: 100 CAPSULE ORAL at 08:26

## 2023-12-19 RX ADMIN — ACETAMINOPHEN 1000 MG: 500 TABLET, FILM COATED ORAL at 13:15

## 2023-12-19 RX ADMIN — CYCLOBENZAPRINE 10 MG: 10 TABLET, FILM COATED ORAL at 21:52

## 2023-12-19 RX ADMIN — GABAPENTIN 400 MG: 100 CAPSULE ORAL at 21:52

## 2023-12-19 RX ADMIN — LIDOCAINE 2 PATCH: 4 PATCH TOPICAL at 08:25

## 2023-12-19 RX ADMIN — ACETAMINOPHEN 1000 MG: 500 TABLET, FILM COATED ORAL at 21:52

## 2023-12-19 RX ADMIN — OXYCODONE HYDROCHLORIDE 5 MG: 5 TABLET ORAL at 08:33

## 2023-12-19 RX ADMIN — IPRATROPIUM BROMIDE AND ALBUTEROL SULFATE 3 ML: .5; 3 SOLUTION RESPIRATORY (INHALATION) at 10:45

## 2023-12-19 RX ADMIN — TRAZODONE HYDROCHLORIDE 50 MG: 50 TABLET ORAL at 21:52

## 2023-12-19 RX ADMIN — POLYETHYLENE GLYCOL 3350 17 G: 17 POWDER, FOR SOLUTION ORAL at 20:40

## 2023-12-19 RX ADMIN — METHYLPREDNISOLONE SODIUM SUCCINATE 62.5 MG: 125 INJECTION, POWDER, FOR SOLUTION INTRAMUSCULAR; INTRAVENOUS at 06:20

## 2023-12-19 RX ADMIN — AMLODIPINE BESYLATE 5 MG: 5 TABLET ORAL at 21:51

## 2023-12-19 RX ADMIN — DULOXETINE HYDROCHLORIDE 20 MG: 20 CAPSULE, DELAYED RELEASE ORAL at 08:26

## 2023-12-19 RX ADMIN — INSULIN ASPART 1 UNITS: 100 INJECTION, SOLUTION INTRAVENOUS; SUBCUTANEOUS at 17:32

## 2023-12-19 RX ADMIN — ACETAMINOPHEN 1000 MG: 500 TABLET, FILM COATED ORAL at 06:20

## 2023-12-19 RX ADMIN — LEVOTHYROXINE SODIUM 125 MCG: 125 TABLET ORAL at 08:26

## 2023-12-19 RX ADMIN — DOCUSATE SODIUM 50 MG AND SENNOSIDES 8.6 MG 2 TABLET: 8.6; 5 TABLET, FILM COATED ORAL at 08:25

## 2023-12-19 RX ADMIN — GABAPENTIN 400 MG: 100 CAPSULE ORAL at 16:32

## 2023-12-19 RX ADMIN — OXYCODONE HYDROCHLORIDE 5 MG: 5 TABLET ORAL at 21:52

## 2023-12-19 RX ADMIN — ASPIRIN 81 MG: 81 TABLET, COATED ORAL at 08:26

## 2023-12-19 RX ADMIN — IPRATROPIUM BROMIDE AND ALBUTEROL SULFATE 3 ML: .5; 3 SOLUTION RESPIRATORY (INHALATION) at 07:28

## 2023-12-19 RX ADMIN — METOPROLOL SUCCINATE 50 MG: 50 TABLET, EXTENDED RELEASE ORAL at 20:40

## 2023-12-19 RX ADMIN — ASPIRIN 81 MG: 81 TABLET, COATED ORAL at 20:40

## 2023-12-19 RX ADMIN — IPRATROPIUM BROMIDE AND ALBUTEROL SULFATE 3 ML: .5; 3 SOLUTION RESPIRATORY (INHALATION) at 19:06

## 2023-12-19 RX ADMIN — POLYETHYLENE GLYCOL 3350 17 G: 17 POWDER, FOR SOLUTION ORAL at 08:25

## 2023-12-19 RX ADMIN — ROSUVASTATIN CALCIUM 10 MG: 10 TABLET, FILM COATED ORAL at 20:40

## 2023-12-19 RX ADMIN — DOCUSATE SODIUM 50 MG AND SENNOSIDES 8.6 MG 2 TABLET: 8.6; 5 TABLET, FILM COATED ORAL at 20:40

## 2023-12-19 ASSESSMENT — ACTIVITIES OF DAILY LIVING (ADL)
ADLS_ACUITY_SCORE: 30
ADLS_ACUITY_SCORE: 30
ADLS_ACUITY_SCORE: 31
ADLS_ACUITY_SCORE: 30
ADLS_ACUITY_SCORE: 30
ADLS_ACUITY_SCORE: 31
ADLS_ACUITY_SCORE: 30
ADLS_ACUITY_SCORE: 31

## 2023-12-19 NOTE — PLAN OF CARE
Summary: Shortness of breath due to COPD exacerbation     DATE & TIME: 12/18-12/19/23 Night shift  Cognitive Concerns/ Orientation: AOx4; pleasant  BEHAVIOR & AGGRESSION TOOL COLOR: Green  ABNL VS/O2: VSS on RA 1 L via nasal cannula with O2 sats in mid 90s (- uses PRN O2 at home); Infrequent, non-productive cough; Expiratory wheezing with scheduled nebs, ACEVES and tachypnic. Still complaining of SOB when resting  MOBILITY: Independent, cane  PAIN MANAGMENT: denies  DIET: Regular  BOWEL/BLADDER: Continent.   ABNORMAL LABS: NA  DRAIN/DEVICES: PIV SL  TELEMETRY RHYTHM: NA  SKIN: Scattered bruising and scabs.  TESTS/PROCEDURES: AXR, result pending.   D/C DAY/GOALS/PLACE: Home with homecare with abx, pending clinical improvement; Needs Sleep study as outpatient to qualify for Bipap at home  OTHER IMPORTANT INFO: Pulmonology following. No improvement with SOB.       Goal Outcome Evaluation:

## 2023-12-19 NOTE — PLAN OF CARE
Goal Outcome Evaluation:DATE & TIME: 12/19/957633-4024  Cognitive Concerns/ Orientation: AOx4; pleasant  BEHAVIOR & AGGRESSION TOOL COLOR: Green  ABNL VS/O2: VSS on RA 2 L via nasal cannula with O2 sats in low 90s (- uses PRN O2 at home); Infrequent, non-productive cough; Expiratory wheezing with scheduled nebs, ACEVES and tachypneic , SOB when resting too.Keep sats above 88%, drops to 86% with activity.  MOBILITY: SBA with cane/ walker .  PAIN MANAGMENT:on scheduled lidocaine patch and ,Tylenol. Given PRN oxy x 1 for Low back pain.  DIET: Regular, good appetite  BOWEL/BLADDER: Continent. Strict I and Os, had one large Bm this shift.   ABNORMAL LABS:Bg 93/ 109  DRAIN/DEVICES: PIV SL  TELEMETRY RHYTHM: NA  SKIN: Scattered bruising and scabs.  TESTS/PROCEDURES:none today  D/C DAY/GOALS/PLACE:pending respiratory status; Needs Sleep study as outpatient to qualify for Bipap at home  OTHER IMPORTANT INFO: Pulmonology following. No improvement with SOB.

## 2023-12-19 NOTE — PROGRESS NOTES
[facility]    Pulmonary, Allergy, Critical Care and Sleep Medicine    Pulmonary Consult Progress Note    Harrison Thomas MRN# [id number]   Age: 78 year old YOB: 1945     Date of Admission: 12/6/2023  Date of Service: 12/19/2023     ==================================================  INTERVAL EVENTS:  -Alternating between 1 and 2 L nasal cannula with significant desaturation on exertion and significant dyspnea even at rest.  Reports that he does not feel that he has really made any improvement while being here  -Remains afebrile  -Respiratory viral panel negative  -Receiving prednisone 60 mg 3 times daily until Saturday, then changed to 60 mg of prednisone daily  -Chest x-ray 12/18 with increasing right upper lobe opacity, plan to repeat on Thursday to reassess  -Complains of significant abdominal distention which is limiting his breathing      CHANGES FOR TODAY:  -Repeat chest x-ray Thursday  -Continue prednisone    ==================================================    ASSESSMENT AND RECOMMENDATIONS:    Harrison Thomas is a 78 year old male with a history of Moderate COPD, 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     ## Acute Hypoxic Respiratory Failure  ## Moderate COPD with Exacerbation  ## Right upper lobe lung mass status post radiation treatment with possible chronic radiation changes versus radiation pneumonitis  ## Seropositive RA (previously on methotrexate and prednisone, now off)  ## Moderate sleep apnea (AHI 20 in 2018)    History of right upper lobe mass without clear etiology after bronchoscopy and VATS biopsy.  Underwent empiric radiation therapy in mid 2022.      Admitted following increased home use of oxygen and rescue therapies. Initially required 5L but has weaned down to  "minimal to no oxygen use.  BNP and procalcitonin negative on admission.  Chest imaging largely stable from prior with some bronchial wall thickening and chronic right upper lobe consolidation which was initially stable, but then increased on 12/18  Ongoing severe wheezing with dependence on steroids. Scheduled bronchodilators, mucolytic and flutter valve given wheezing and report of difficulty clearing some sputum.  Respiratory viral panel negative.  Sniff test within normal limits.    At this time it does not appear that his COPD qualifies him for home positive pressure therapy, however he does have a history of BRENNEN which would qualify him, though his last sleep study was in 2018 so this will likely need to be repeated.    No worsening in his symptoms or oxygen requirements since decreasing prednisone    -Follow-up with sleep medicine after discharge  - Duonebs with Mucomyst QID  - Flutter valve following nebs QID  -Agree with p.o. steroids          Aidan Mcneil M.D.  Pulmonary & Critical Care  Pager: Click Here to page      I spent 45 minutes dedicated to this care so far today excluding procedures, including review of medical records, review of imaging (results & images), time with patient and time in documentation.    ==================================================      PHYSICAL EXAM  BP (!) 144/78 (BP Location: Right arm)   Pulse 78   Temp 98.7  F (37.1  C) (Oral)   Resp 24   Ht 1.778 m (5' 10\")   Wt 83.2 kg (183 lb 6.4 oz)   SpO2 91%   BMI 26.32 kg/m        Intake/Output Summary (Last 24 hours) at 12/18/2023 0948  Last data filed at 12/18/2023 0500  Gross per 24 hour   Intake 240 ml   Output --   Net 240 ml       Vitals:    12/06/23 1752 12/06/23 2200 12/18/23 0900   Weight: 79.4 kg (175 lb) 82.1 kg (181 lb) 83.2 kg (183 lb 6.4 oz)         General: Alert, interactive, dyspneic at rest but not particularly distressed  Resp: Predominant wheezing throughout  Cardiac: RRR, NS1,S2, No " "m/r/g  Abdomen: Firm (but not rigid), distended, tympanic to percussion with active bowel sounds  Extremities: Trace lower extremity edema  Skin: Warm and dry, no jaundice or rash  Neuro: Alert & oriented x 3, Cns 2-12 intact, moves all extremities equally      CT chest 12/18  Increased consolidation in the right upper lobe since  comparison. Lungs otherwise clear.     Recent Labs   Lab Test 12/18/23  1313 12/17/23  0839 12/16/23  1112   WBC 15.9* 13.5* 14.9*   RBC 4.43 4.44 4.37*   HGB 13.3 13.4 13.3   * 115* 134*       Recent Labs   Lab Test 12/19/23  0747 12/18/23  1313 12/18/23  1215 12/17/23  1109 12/17/23  0839 12/17/23  0154 12/16/23  1112 12/07/23  1652 12/06/23  1801 06/17/23  0814 06/17/23  0704 06/16/23  1217 06/16/23  0707   NA  --  141  --   --  140  --  138   < > 139   < > 138  --  140   POTASSIUM  --  4.4  --   --  4.3  --  4.0   < > 4.3   < > 4.2  --  4.2   CHLORIDE  --  102  --   --  103  --  101   < > 102   < > 103  --  106   CO2  --  30*  --   --  29  --  27   < > 22   < > 28  --  27   BUN  --  15.3  --   --  19.1  --  22.7   < > 17.6   < > 11.6  --  14.3   CR  --  0.79  --   --  0.94  --  1.03   < > 0.87   < > 0.67  --  0.72   GLC 93 159* 154*   < > 141*   < > 190*   < > 213*   < > 91   < > 89   KARLIE  --  8.4*  --   --  8.1*  --  8.0*   < > 9.5   < > 8.6*  --  8.3*   MAG  --   --   --   --   --   --   --   --  2.3  --  1.9  --  2.0    < > = values in this interval not displayed.           No results for input(s): \"CULT\" in the last 168 hours.      Recent Results (from the past 48 hour(s))   XR Chest Port 1 View    Narrative    CHEST ONE VIEW  12/18/2023 8:26 AM     HISTORY: COPD, mass.    COMPARISON: December 6, 2023      Impression    IMPRESSION: Increased consolidation in the right upper lobe since  comparison. Lungs otherwise clear.     RANDELL LOTT MD         SYSTEM ID:  [id number]   XR Abdomen 1 View    Narrative    XR ABDOMEN 1 VIEW 12/18/2023 6:19 PM    HISTORY: Distended " abdomen    COMPARISON: None.      Impression    IMPRESSION: Large amount of stool throughout the colon consistent with  constipation. No obstruction.    ES KEANE MD         SYSTEM ID:  [id number]

## 2023-12-19 NOTE — PLAN OF CARE
Goal Outcome Evaluation:  Summary: Shortness of breath due to COPD exacerbation     DATE & TIME: 12/18 1500- 2330  Cognitive Concerns/ Orientation: AOx4  BEHAVIOR & AGGRESSION TOOL COLOR: Green  ABNL VS/O2: VSS on RA 1 L via nasal cannula with O2 sats in mid 90s (- uses PRN O2 at home); Infrequent, non-productive cough; Expiratory wheezing with scheduled nebs , ACEVES and tachypnic.  MOBILITY: Independent, cane  PAIN MANAGMENT: denies  DIET: Regular  BOWEL/BLADDER: Continent. ABNORMAL LABS: ,   DRAIN/DEVICES: PIV SL  TELEMETRY RHYTHM: NA  SKIN: Scattered bruising and scabs.  TESTS/PROCEDURES: abd XR, result pending.   D/C DAY/GOALS/PLACE: Home with homecare with abx, pending clinical improvement  OTHER IMPORTANT INFO: Pulmonology following. No improvement with SOB. Oxy given at 10pm

## 2023-12-19 NOTE — PROGRESS NOTES
[facility]    Medicine Progress Note - Hospitalist Service    Date of Admission:  12/6/2023    Assessment & Plan   nathaniel Thomas is a 78 year old male with past medical history significant for chronic hypoxic respiratory failure [has oxygen as needed at home], COPD, RUL lung mass s/p radiation, untreated BRENNEN, HTN, HLD, CAD, hypothyroidism admitted on 12/6/2023 with acute on chronic hypoxic respiratory failure likely 2/2 COPD exacerbation.      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  - He uses O2 at home as needed, but has using it much more recently.  - He has been trying nebs at home and did get a prescription for prednisone prior to admission  - Clinical picture is consistent with COPD exacerbation.  - He has poor air movement on pulmonary auscultation and diffuse expiratory wheezing.  - BNP and procalcitonin are within normal limits.  - COVID, influenza and RSV panel is negative.  - CXR showed just faint residual opacities in the RUL corresponding to RUL mass and post-treatment changes.  - He was initially placed on BiPAP in the ED for work of breathing, but has since been weaned off. He was also treated with duonebs, mag and methylprednisolone.    Continuous oximetry and supplemental O2 as needed   Initially on IV Solu-Medrol every 8 hours, but was switched to p.o. prednisone from 12/12/2023.  Subsequently switched back to IV methylprednisolone given continued significant dyspnea.  Continue home trelegy Ellipta inhaler  Started treatment dose azithromycin (pt s on every other day dosing for prophylaxis).  Completed 5 days treatment dose, now back on QOD prophylactic dose.    -Follows with Dr. Handy with pulmonology at [facility], last seen in September 2023.  - Family apparently asking about home  BiPAP.  Placed consult to pulmonology for recommendations regarding possible BiPAP at home, further COPD management given patient still significantly symptomatic from advanced COPD.  - CT chest done that was negative for PE, overall stable from last CT-no new significant findings.  - Also underwent sniff test this admission that was negative.  -- Of note he was on low-dose chronic prednisone PTA.  - Regarding possible home BiPAP-this will likely need to be accomplished as outpatient.  --Scheduled Duonebs  -Acetylcysteine nebs   -Currently on 1 liter oxygen /Room Air   -Use ICS and flutter valve   -respiratory virus panel negative  -Continue PTA Lasix 20 mg daily, BNP normal at admission and on 12/18.  -Will need home oxygen evaluation at time of discharge  -Seen again by pulmonologist on 12/18.  He had repeat chest x-ray that showed increased right upper lobe consolidation compared to prior.  Per pulmonology-at this time COPD does not qualify him for home positive pressure therapy but his history of BRENNEN would likely qualify him-would need a repeat sleep study as outpatient as last one was in 2018.  Follow-up with sleep medicine after discharge.  Per pulmonology-increased consolidation in right upper lobe, could be due to mucous?.   -Will transition to p.o. prednisone 40 Mg daily from 12/20.  -Had large BM on 12/19, hopefully with decreased abdominal distention, improved constipation, this would lessen his respiratory effort.  Continue aggressive bowel regimen.     Hypertension   Hyperlipidemia  Coronary artery disease   - Continue PTA amlodipine   - COntinue PTA ASA and rosuvastatin   - COntinue PTA metoprolol and amlodpine   - COntinue PTA lasix      Chronic low back pain   Constipation  Continue PTA oxycodone, gabapentin   - Pt is supposed to be getting lumbar MRI soon.  Can be done as an outpatient.  Hold naproxen for now in the setting of high-dose steroids since he would be at high risk for upper GI bleed and  "ulcers.  Oral PPI for prophylaxis  On bowel regimen for constipation   Flexeril to 10 mg 3 times daily PRN   Scheduled Tylenol  -lidocaine patches for better pain control  Gabapentin doses 400 mg TID for more even distribution of the dosage during the daytime  Given concern for abdominal distention, obtained abdominal x-ray on 12/18 that still showed large amount of stool throughout the colon.  Continue on aggressive bowel regimen.  Had a large BM on 12/19.     Hx of papillary thyroid cancer s/p thyroidectomy   Hypothyroidism   - Continue PTA levothyroxine      Insomnia   - Continue TPA Trazodone           Diet: Regular Diet Adult    DVT Prophylaxis: Pneumatic Compression Devices  Mir Catheter: Not present  Lines: None     Cardiac Monitoring: None  Code Status: No CPR- Do NOT Intubate      Clinically Significant Risk Factors              # Hypoalbuminemia: Lowest albumin = 3.4 g/dL at 12/8/2023 11:58 AM, will monitor as appropriate   # Thrombocytopenia: Lowest platelets = 119 in last 2 days, will monitor for bleeding   # Hypertension: Noted on problem list        # Overweight: Estimated body mass index is 26.32 kg/m  as calculated from the following:    Height as of this encounter: 1.778 m (5' 10\").    Weight as of this encounter: 83.2 kg (183 lb 6.4 oz).             Disposition Plan      Expected Discharge Date: 12/21/2023        Discharge Comments: pt is on iv solumedrol          Ashley Langley MD  Hospitalist Service  [facility]  Securely message with [employer] (more info)  Text page via [employer] Paging/Directory   ______________________________________________________________________    Interval History   Patient essentially unchanged.  Still significantly dyspneic with significant wheezing with minimal activity, just from transferring from bed to chair.  Did have a large BM this morning and abdomen feels less distended.        Physical Exam   Vital Signs: Temp: 98.7  F (37.1  C) Temp src: " Oral BP: (!) 144/78 Pulse: 78   Resp: 24 SpO2: 91 % O2 Device: Nasal cannula Oxygen Delivery: 4 LPM  Weight: 183 lbs 6.4 oz    General: Alert, cooperative, dyspneic with minimal activity,  sitting on chair  Respiratory: Breathing appeared  labored, poor air movement bilaterally, scattered expiratory wheezes  Cardiac: RRR, no edema  Abdomen: Soft, nontender, distended, bowel sounds heard  MSK: Moving all extremities  Neuro: Alert, oriented, no facial asymmetry, fluent speech  Psych: Appears a bit anxious, breathing appeared labored       Medical Decision Making       Spent 35 minutes on patient encounter: Called and updated patient's daughter on phone.  Discussed pulmonary recommendations.  Discussed with bedside RN.    Data     I have personally reviewed the following data over the past 24 hrs:    15.9 (H)  \   13.3   / 119 (L)     141 102 15.3 /  93   4.4 30 (H) 0.79 \     Trop: N/A BNP: 515       Imaging results reviewed over the past 24 hrs:   Recent Results (from the past 24 hour(s))   XR Abdomen 1 View    Narrative    XR ABDOMEN 1 VIEW 12/18/2023 6:19 PM    HISTORY: Distended abdomen    COMPARISON: None.      Impression    IMPRESSION: Large amount of stool throughout the colon consistent with  constipation. No obstruction.    ES KEANE MD         SYSTEM ID:  [id number]

## 2023-12-20 DIAGNOSIS — E78.5 HYPERLIPIDEMIA, UNSPECIFIED: ICD-10-CM

## 2023-12-20 LAB
ALLEN'S TEST: YES
BASE EXCESS BLDA CALC-SCNC: 8.3 MMOL/L (ref -9–1.8)
GLUCOSE BLDC GLUCOMTR-MCNC: 103 MG/DL (ref 70–99)
GLUCOSE BLDC GLUCOMTR-MCNC: 103 MG/DL (ref 70–99)
GLUCOSE BLDC GLUCOMTR-MCNC: 114 MG/DL (ref 70–99)
GLUCOSE BLDC GLUCOMTR-MCNC: 152 MG/DL (ref 70–99)
GLUCOSE BLDC GLUCOMTR-MCNC: 91 MG/DL (ref 70–99)
HCO3 BLD-SCNC: 34 MMOL/L (ref 21–28)
O2/TOTAL GAS SETTING VFR VENT: 50 %
OXYHGB MFR BLD: 96 % (ref 92–100)
PCO2 BLD: 48 MM HG (ref 35–45)
PH BLD: 7.45 [PH] (ref 7.35–7.45)
PO2 BLD: 88 MM HG (ref 80–105)

## 2023-12-20 PROCEDURE — 99232 SBSQ HOSP IP/OBS MODERATE 35: CPT | Performed by: INTERNAL MEDICINE

## 2023-12-20 PROCEDURE — 250N000011 HC RX IP 250 OP 636: Performed by: HOSPITALIST

## 2023-12-20 PROCEDURE — 94660 CPAP INITIATION&MGMT: CPT

## 2023-12-20 PROCEDURE — 250N000009 HC RX 250: Performed by: STUDENT IN AN ORGANIZED HEALTH CARE EDUCATION/TRAINING PROGRAM

## 2023-12-20 PROCEDURE — 250N000013 HC RX MED GY IP 250 OP 250 PS 637: Performed by: HOSPITALIST

## 2023-12-20 PROCEDURE — 250N000009 HC RX 250: Performed by: HOSPITALIST

## 2023-12-20 PROCEDURE — 94640 AIRWAY INHALATION TREATMENT: CPT | Mod: 76

## 2023-12-20 PROCEDURE — 120N000001 HC R&B MED SURG/OB

## 2023-12-20 PROCEDURE — 250N000013 HC RX MED GY IP 250 OP 250 PS 637: Performed by: INTERNAL MEDICINE

## 2023-12-20 PROCEDURE — 94640 AIRWAY INHALATION TREATMENT: CPT

## 2023-12-20 PROCEDURE — 99233 SBSQ HOSP IP/OBS HIGH 50: CPT | Performed by: HOSPITALIST

## 2023-12-20 PROCEDURE — 250N000012 HC RX MED GY IP 250 OP 636 PS 637: Performed by: HOSPITALIST

## 2023-12-20 PROCEDURE — 250N000013 HC RX MED GY IP 250 OP 250 PS 637: Performed by: STUDENT IN AN ORGANIZED HEALTH CARE EDUCATION/TRAINING PROGRAM

## 2023-12-20 PROCEDURE — 82805 BLOOD GASES W/O2 SATURATION: CPT | Performed by: HOSPITALIST

## 2023-12-20 PROCEDURE — 999N000157 HC STATISTIC RCP TIME EA 10 MIN

## 2023-12-20 RX ORDER — ROSUVASTATIN CALCIUM 10 MG/1
TABLET, COATED ORAL
Qty: 90 TABLET | Refills: 3 | Status: SHIPPED | OUTPATIENT
Start: 2023-12-20

## 2023-12-20 RX ORDER — DIAZEPAM 10 MG/2ML
2.5 INJECTION, SOLUTION INTRAMUSCULAR; INTRAVENOUS ONCE
Status: COMPLETED | OUTPATIENT
Start: 2023-12-20 | End: 2023-12-20

## 2023-12-20 RX ORDER — LORAZEPAM 2 MG/ML
0.5 INJECTION INTRAMUSCULAR ONCE
Status: DISCONTINUED | OUTPATIENT
Start: 2023-12-20 | End: 2023-12-20

## 2023-12-20 RX ORDER — CARBOXYMETHYLCELLULOSE SODIUM 5 MG/ML
1 SOLUTION/ DROPS OPHTHALMIC
Status: DISCONTINUED | OUTPATIENT
Start: 2023-12-20 | End: 2024-01-10 | Stop reason: HOSPADM

## 2023-12-20 RX ADMIN — IPRATROPIUM BROMIDE AND ALBUTEROL SULFATE 3 ML: .5; 3 SOLUTION RESPIRATORY (INHALATION) at 08:47

## 2023-12-20 RX ADMIN — DOCUSATE SODIUM 50 MG AND SENNOSIDES 8.6 MG 2 TABLET: 8.6; 5 TABLET, FILM COATED ORAL at 10:33

## 2023-12-20 RX ADMIN — DOCUSATE SODIUM 50 MG AND SENNOSIDES 8.6 MG 2 TABLET: 8.6; 5 TABLET, FILM COATED ORAL at 22:10

## 2023-12-20 RX ADMIN — GABAPENTIN 400 MG: 100 CAPSULE ORAL at 10:33

## 2023-12-20 RX ADMIN — AMLODIPINE BESYLATE 5 MG: 5 TABLET ORAL at 22:09

## 2023-12-20 RX ADMIN — OXYCODONE HYDROCHLORIDE 5 MG: 5 TABLET ORAL at 06:07

## 2023-12-20 RX ADMIN — TRAZODONE HYDROCHLORIDE 50 MG: 50 TABLET ORAL at 22:10

## 2023-12-20 RX ADMIN — IPRATROPIUM BROMIDE AND ALBUTEROL SULFATE 3 ML: .5; 3 SOLUTION RESPIRATORY (INHALATION) at 11:13

## 2023-12-20 RX ADMIN — ASPIRIN 81 MG: 81 TABLET, COATED ORAL at 22:09

## 2023-12-20 RX ADMIN — ASPIRIN 81 MG: 81 TABLET, COATED ORAL at 10:33

## 2023-12-20 RX ADMIN — DULOXETINE HYDROCHLORIDE 20 MG: 20 CAPSULE, DELAYED RELEASE ORAL at 22:09

## 2023-12-20 RX ADMIN — AZITHROMYCIN DIHYDRATE 500 MG: 250 TABLET ORAL at 10:33

## 2023-12-20 RX ADMIN — PREDNISONE 40 MG: 20 TABLET ORAL at 10:33

## 2023-12-20 RX ADMIN — IPRATROPIUM BROMIDE AND ALBUTEROL SULFATE 3 ML: .5; 3 SOLUTION RESPIRATORY (INHALATION) at 15:15

## 2023-12-20 RX ADMIN — DIAZEPAM 2.5 MG: 5 INJECTION INTRAMUSCULAR; INTRAVENOUS at 10:32

## 2023-12-20 RX ADMIN — GABAPENTIN 400 MG: 100 CAPSULE ORAL at 16:26

## 2023-12-20 RX ADMIN — OXYCODONE HYDROCHLORIDE 5 MG: 5 TABLET ORAL at 17:49

## 2023-12-20 RX ADMIN — ACETAMINOPHEN 1000 MG: 500 TABLET, FILM COATED ORAL at 22:09

## 2023-12-20 RX ADMIN — IPRATROPIUM BROMIDE AND ALBUTEROL SULFATE 3 ML: .5; 3 SOLUTION RESPIRATORY (INHALATION) at 19:46

## 2023-12-20 RX ADMIN — POLYETHYLENE GLYCOL 3350 17 G: 17 POWDER, FOR SOLUTION ORAL at 22:09

## 2023-12-20 RX ADMIN — METOPROLOL SUCCINATE 50 MG: 50 TABLET, EXTENDED RELEASE ORAL at 22:09

## 2023-12-20 RX ADMIN — LEVOTHYROXINE SODIUM 125 MCG: 125 TABLET ORAL at 10:33

## 2023-12-20 RX ADMIN — PANTOPRAZOLE SODIUM 40 MG: 40 TABLET, DELAYED RELEASE ORAL at 06:07

## 2023-12-20 RX ADMIN — LIDOCAINE 2 PATCH: 4 PATCH TOPICAL at 10:34

## 2023-12-20 RX ADMIN — POLYETHYLENE GLYCOL 3350 17 G: 17 POWDER, FOR SOLUTION ORAL at 10:34

## 2023-12-20 RX ADMIN — ACETAMINOPHEN 1000 MG: 500 TABLET, FILM COATED ORAL at 06:07

## 2023-12-20 RX ADMIN — FUROSEMIDE 20 MG: 20 TABLET ORAL at 10:33

## 2023-12-20 RX ADMIN — ROSUVASTATIN CALCIUM 10 MG: 10 TABLET, FILM COATED ORAL at 22:09

## 2023-12-20 RX ADMIN — DULOXETINE HYDROCHLORIDE 20 MG: 20 CAPSULE, DELAYED RELEASE ORAL at 10:33

## 2023-12-20 RX ADMIN — IPRATROPIUM BROMIDE AND ALBUTEROL SULFATE 3 ML: .5; 3 SOLUTION RESPIRATORY (INHALATION) at 07:16

## 2023-12-20 RX ADMIN — GABAPENTIN 400 MG: 100 CAPSULE ORAL at 22:10

## 2023-12-20 ASSESSMENT — ACTIVITIES OF DAILY LIVING (ADL)
ADLS_ACUITY_SCORE: 31
ADLS_ACUITY_SCORE: 31
ADLS_ACUITY_SCORE: 29
ADLS_ACUITY_SCORE: 29
ADLS_ACUITY_SCORE: 31
ADLS_ACUITY_SCORE: 29
ADLS_ACUITY_SCORE: 30
ADLS_ACUITY_SCORE: 29
ADLS_ACUITY_SCORE: 31
ADLS_ACUITY_SCORE: 29

## 2023-12-20 NOTE — PROGRESS NOTES
[facility]    Medicine Progress Note - Hospitalist Service    Date of Admission:  12/6/2023    Assessment & Plan   Harrison Thomas is a 78 year old male with past medical history significant for chronic hypoxic respiratory failure [has oxygen as needed at home], COPD, RUL lung mass s/p radiation, untreated BRENNEN, HTN, HLD, CAD, hypothyroidism admitted on 12/6/2023 with acute on chronic hypoxic respiratory failure likely 2/2 COPD exacerbation.      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  - He uses O2 at home as needed, but has using it much more recently.  - He has been trying nebs at home and did get a prescription for prednisone prior to admission  - Clinical picture is consistent with COPD exacerbation.  - He has poor air movement on pulmonary auscultation and diffuse expiratory wheezing.  - BNP and procalcitonin are within normal limits.  - COVID, influenza and RSV panel is negative.  - CXR showed just faint residual opacities in the RUL corresponding to RUL mass and post-treatment changes.  - He was initially placed on BiPAP in the ED for work of breathing, but has since been weaned off. He was also treated with duonebs, mag and methylprednisolone.    Continuous oximetry and supplemental O2 as needed   Initially on IV Solu-Medrol every 8 hours, but was switched to p.o. prednisone from 12/12/2023.  Subsequently switched back to IV methylprednisolone given continued significant dyspnea.  Back to p.o. prednisone on 12/20.  Continue home trelegy Ellipta inhaler  Started treatment dose azithromycin (pt s on every other day dosing for prophylaxis).  Completed 5 days treatment dose, now back on QOD prophylactic dose.    -Follows with Dr. Handy with pulmonology at [facility], last seen in September 2023.  -  Family apparently asking about home BiPAP.  Placed consult to pulmonology for recommendations regarding possible BiPAP at home, further COPD management given patient still significantly symptomatic from advanced COPD.  - CT chest done that was negative for PE, overall stable from last CT-no new significant findings.  - Also underwent sniff test this admission that was negative.  -- Of note he was on low-dose chronic prednisone PTA.  - Regarding possible home BiPAP-this will likely need to be accomplished as outpatient.  --Scheduled Duonebs  -Acetylcysteine nebs   -Currently on 1 liter oxygen /Room Air   -Use ICS and flutter valve   -respiratory virus panel negative  -Continue PTA Lasix 20 mg daily, BNP normal at admission and on 12/18.  -Will need home oxygen evaluation at time of discharge  -Seen again by pulmonologist on 12/18.  He had repeat chest x-ray that showed increased right upper lobe consolidation compared to prior.  Per pulmonology-at this time COPD does not qualify him for home positive pressure therapy but his history of BRENNEN would likely qualify him-would need a repeat sleep study as outpatient as last one was in 2018.  Follow-up with sleep medicine after discharge.  Per pulmonology-increased consolidation in right upper lobe, could be due to mucous?.   -Transitioned to p.o. prednisone 40 Mg daily from 12/20.  -Had large BM on 12/19, hopefully with decreased abdominal distention, improved constipation, this would lessen his respiratory effort.  Continue aggressive bowel regimen.  -Plan to repeat CT chest on 12/21.  On 12/20, patient had increased respiratory effort, was briefly on BiPAP and then placed on high flow nasal cannula oxygen.  ABG looked okay with no significant hypercapnia.  Continue current supportive cares.  Discussed with pulmonary.     Hypertension   Hyperlipidemia  Coronary artery disease   - Continue PTA amlodipine   - COntinue PTA ASA and rosuvastatin   - COntinue PTA metoprolol and  "amlodpine   - COntinue PTA lasix      Chronic low back pain   Constipation  Continue PTA oxycodone, gabapentin   - Pt is supposed to be getting lumbar MRI soon.  Can be done as an outpatient.  Hold naproxen for now in the setting of high-dose steroids since he would be at high risk for upper GI bleed and ulcers.  Oral PPI for prophylaxis  On bowel regimen for constipation   Flexeril to 10 mg 3 times daily PRN   Scheduled Tylenol  -lidocaine patches for better pain control  Gabapentin doses 400 mg TID for more even distribution of the dosage during the daytime  Given concern for abdominal distention, obtained abdominal x-ray on 12/18 that still showed large amount of stool throughout the colon.  Continue on aggressive bowel regimen.  Had a large BM on 12/19.     Hx of papillary thyroid cancer s/p thyroidectomy   Hypothyroidism   - Continue PTA levothyroxine      Insomnia   - Continue TPA Trazodone     Leukocytosis  -Secondary to steroids     Thrombocytopenia  PC in low 100s.  No evidence of bleeding.  Not on any heparin products.  Is on baby aspirin twice daily.  May need to hold if PC keeps going down.    Called and left voicemail for daughterJaney.     Diet: Moderate Consistent Carb (60 g CHO per Meal) Diet    DVT Prophylaxis: Pneumatic Compression Devices  Mir Catheter: Not present  Lines: None     Cardiac Monitoring: None  Code Status: No CPR- Do NOT Intubate      Clinically Significant Risk Factors              # Hypoalbuminemia: Lowest albumin = 3.4 g/dL at 12/8/2023 11:58 AM, will monitor as appropriate     # Hypertension: Noted on problem list        # Overweight: Estimated body mass index is 25.71 kg/m  as calculated from the following:    Height as of this encounter: 1.778 m (5' 10\").    Weight as of this encounter: 81.3 kg (179 lb 3.2 oz).             Disposition Plan      Expected Discharge Date: 12/22/2023        Discharge Comments: pt is on iv solumedrol          Ashley Langley MD  Hospitalist " [facility]  Securely message with Seng (more info)  Text page via [employer] Paging/Directory   ______________________________________________________________________    Interval History   Patient had a rough morning.  Apparently he had gotten up to go to the bathroom and taken off his oxygen.  By the time he came back he was very short of breath, per bedside RN his O2 sats were in the 70s.  RT was called immediately and he was placed on BiPAP for about an hour but apparently his respiratory effort still did not improve despite this.  ABG done did not show any significant hypercapnia.  Subsequently maintaining O2 sats in the low 90s on high flow nasal cannula oxygen.  Saw patient along with pulmonologist.  Abdomen is still distended and plan is to continue aggressive bowel regimen.  Continue p.o. prednisone, nebs.  Plan to repeat CT chest tomorrow.  Called and left voicemail for patient's daughter.        Physical Exam   Vital Signs: Temp: 97.4  F (36.3  C) Temp src: Oral BP: 118/69 Pulse: 90   Resp: 28 SpO2: 96 % O2 Device: High Flow Nasal Cannula (HFNC) Oxygen Delivery: 50 LPM  Weight: 179 lbs 3.2 oz    General: Alert, cooperative, mild to moderate respiratory distress, tachypneic, laying in bed  Respiratory: Breathing appeared  labored, poor air movement bilaterally, less wheezing today  Cardiac: RRR, no edema  Abdomen: Soft, nontender, distended, bowel sounds heard  MSK: Moving all extremities  Neuro: Alert, oriented, no facial asymmetry, fluent speech  Psych: Appears a bit anxious, breathing appeared labored       Medical Decision Making       Spent 55 minutes on patient encounter: Called and left voicemail for patient's daughter, discussed with bedside RN, pulmonologist, patient.    Data         Imaging results reviewed over the past 24 hrs:   No results found for this or any previous visit (from the past 24 hour(s)).

## 2023-12-20 NOTE — PROGRESS NOTES
ABG drawn 1 hour after placement on BiPAP. Drawn from right radial.    Results:  7.45pH  48CO2  8802  34HC03    Patient said his WOB felt the same. Patient would not keep mask on any longer, placed patient on HFNC 60L 50%.    MARCY NICHOLE, RRT

## 2023-12-20 NOTE — PLAN OF CARE
Summary: Shortness of breath due to COPD exacerbation     DATE & TIME: 12/19/23 Evening  Cognitive Concerns/ Orientation: AOx4; pleasant  BEHAVIOR & AGGRESSION TOOL COLOR: Green  ABNL VS/O2: VSS on RA 2 L via nasal cannula with O2 sats in mid 90s (- uses PRN O2 at home); Infrequent, non-productive cough; Expiratory wheezing with scheduled nebs, ACEVES and tachypnic\ SOB when resting too.  MOBILITY: SBA with cane .  PAIN MANAGMENT:on scheduled lidocaine patch and ,Tylenol. Given PRN oxy x 1 for Low back pain. Lidocaine patch removed  DIET: Regular, good appetite  BOWEL/BLADDER: Continent. Strict I and Os, had one large Bm this morning   ABNORMAL LABS:Bg 178, 222 Ca 8.4  DRAIN/DEVICES: new Left wrist PIV SL  TELEMETRY RHYTHM: NA  SKIN: Scattered bruising and scabs.  TESTS/PROCEDURES:none today  D/C DAY/GOALS/PLACE:pending respiratory status; Needs Sleep study as outpatient to qualify for Bipap at home  OTHER IMPORTANT INFO: Pulmonology following. No improvement with SOB.

## 2023-12-20 NOTE — PROGRESS NOTES
Placed patient on BiPAP 14/6 50% due to SOB and increased oxygen needs. PRN neb given. Expiratory wheezes in all lung fields.      Settings:  14/6 12RR 50%    Readings:  37RR  496Vt  20.4Ve  17PIP  0Leak      RT will continue to follow.    MARCY NICHOLE, RRT

## 2023-12-20 NOTE — PLAN OF CARE
Summary: Shortness of breath due to COPD exacerbation     DATE & TIME: 12/19-12/20/23 Night shift  Cognitive Concerns/ Orientation: AOx4; pleasant  BEHAVIOR & AGGRESSION TOOL COLOR: Green  ABNL VS/O2: VSS on RA 2 L via nasal cannula with O2 sats in mid 90s (- uses PRN O2 at home); Infrequent, non-productive cough; Expiratory wheezing with scheduled nebs, ACEVES and tachypnic; SOB when resting too.  MOBILITY: Ind with cane .  PAIN MANAGMENT: On scheduled lidocaine patch and Tylenol; gave PRN oxy x 1 for low back pain.   DIET: Regular  BOWEL/BLADDER: Continent. Strict I and Os; still feels full and constipated  ABNORMAL LABS:   DRAIN/DEVICES: Left wrist PIV SL  TELEMETRY RHYTHM: NA  SKIN: Scattered bruising and scabs.  TESTS/PROCEDURES: CXR ordered for 12/21  D/C DAY/GOALS/PLACE: Pending respiratory status; Needs Sleep study as outpatient to qualify for Bipap at home      Goal Outcome Evaluation:

## 2023-12-20 NOTE — TELEPHONE ENCOUNTER
Prescription approved per [facility] Refill Protocol.  Analy Gusman, RN  [facility] Triage Nurse

## 2023-12-20 NOTE — PROVIDER NOTIFICATION
Notified Dr. Langley that patient was visibly breathing harder, using abdominal muscles, RR in the low 30's and needing increase O2 (15L from 5L) after walking to bathroom on RA (which he had been doing this admission). Requesting for BIPAP order to give patient a rest. MD in agreement and will see patient. Paged RT for BIPAP.

## 2023-12-20 NOTE — PLAN OF CARE
Goal Outcome Evaluation:      Plan of Care Reviewed With: patient    Overall Patient Progress: no changeOverall Patient Progress: no change       Neuro:intact  CV/Rhythm:wdl  Resp/02:50% 50 L hi ernesto  GI/Diet:mod carb, refused lunch, BGM 91 - mag citrate here for patient in box, will administer when awake  :DTV  Skin/Incisions/Sites:dry, moves in bed independently  Pulses/CMS:intact  Edema:none  Activity/Falls Risk:fall risk, refused bed alarm, will call to get OOB, likes to sit on edge of bed  Lines/Drains/IVs:SL PIV  Labs/BGM:-  Test/Procedures:CT/CXR thursday  VS/Pain:stable, c/o chronic back pain  DC Plan:?  Other:bipap this am briefly

## 2023-12-20 NOTE — PROGRESS NOTES
[facility]    Pulmonary, Allergy, Critical Care and Sleep Medicine    Pulmonary Consult Progress Note    Harrison Thomas MRN# [id number]   Age: 78 year old YOB: 1945     Date of Admission: 12/6/2023  Date of Service: 12/20/2023     ==================================================  INTERVAL EVENTS:  - O2 needs increasing, previously on 1-2L, now up to 4-5 this AM, Then HFNC mid morning  -Remains afebrile  -abdominal distention improving but still present  - Wheezing improving      CHANGES FOR TODAY:  -CT tomorrow AM  -Continue prednisone    ==================================================    ASSESSMENT AND RECOMMENDATIONS:    Harrison Thomas is a 78 year old male with a history of Moderate COPD, 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     ## Acute Hypoxic Respiratory Failure  ## Moderate COPD with Exacerbation  ## Right upper lobe lung mass status post radiation treatment with possible chronic radiation changes versus radiation pneumonitis  ## Seropositive RA (previously on methotrexate and prednisone, now off)  ## Moderate sleep apnea (AHI 20 in 2018)    History of right upper lobe mass without clear etiology after bronchoscopy and VATS biopsy.  Underwent empiric radiation therapy in mid 2022.      Admitted following increased home use of oxygen and rescue therapies. Initially required 5L but has weaned down to minimal to no oxygen use.  BNP and procalcitonin negative on admission.  Chest imaging largely stable from prior with some bronchial wall thickening and chronic right upper lobe consolidation which was initially stable, but then increased on 12/18.  Persistent severe wheezing with dependence on steroids, though slightly improved on 12/20. Scheduled bronchodilators, mucolytic and  "flutter valve given wheezing and report of difficulty clearing some sputum.  Respiratory viral panel negative.  Sniff test within normal limits. Abdominal distention likely impacting breathing.    At this time it does not appear that his COPD qualifies him for home positive pressure therapy, however he does have a history of BRENNEN which would qualify him, though his last sleep study was in 2018 so this will likely need to be repeated.    No worsening in his symptoms or oxygen requirements since decreasing prednisone    -Follow-up with sleep medicine after discharge  - Duonebs with Mucomyst QID  - Flutter valve following nebs QID  -Agree with p.o. steroids          Aidan Mcneil M.D.  Pulmonary & Critical Care  Pager: Click Here to page      I spent 45minutes dedicated to this care so far today excluding procedures, including review of medical records, review of imaging (results & images), time with patient and time in documentation.    ==================================================      PHYSICAL EXAM  BP (!) 164/96 (BP Location: Right arm)   Pulse 81   Temp 97.4  F (36.3  C) (Oral)   Resp 28   Ht 1.778 m (5' 10\")   Wt 81.3 kg (179 lb 3.2 oz)   SpO2 91%   BMI 25.71 kg/m        Intake/Output Summary (Last 24 hours) at 12/18/2023 0948  Last data filed at 12/18/2023 0500  Gross per 24 hour   Intake 240 ml   Output --   Net 240 ml       Vitals:    12/06/23 2200 12/18/23 0900 12/19/23 1700   Weight: 82.1 kg (181 lb) 83.2 kg (183 lb 6.4 oz) 81.3 kg (179 lb 3.2 oz)         General: Alert, interactive, dyspneic at rest but not particularly distressed  Resp: Predominant low pitched wheezing throughout, improved from yesterday  Cardiac: RRR, NS1,S2, No m/r/g  Abdomen: Firm (but not rigid), though less so than yesterday, distended, tympanic to percussion with active bowel sounds  Extremities: Trace lower extremity edema  Skin: Warm and dry, no jaundice or rash  Neuro: Alert & oriented x 3, Cns 2-12 intact, moves " "all extremities equally      CT chest 12/18  Increased consolidation in the right upper lobe since  comparison. Lungs otherwise clear.       Recent Labs   Lab Test 12/18/23  1313 12/17/23  0839 12/16/23  1112   WBC 15.9* 13.5* 14.9*   RBC 4.43 4.44 4.37*   HGB 13.3 13.4 13.3   * 115* 134*       Recent Labs   Lab Test 12/20/23  0816 12/20/23  0237 12/19/23  2150 12/19/23  0747 12/18/23  1313 12/17/23  1109 12/17/23  0839 12/17/23  0154 12/16/23  1112 12/07/23  1652 12/06/23  1801 06/17/23  0814 06/17/23  0704 06/16/23  1217 06/16/23  0707   NA  --   --   --   --  141  --  140  --  138   < > 139   < > 138  --  140   POTASSIUM  --   --   --   --  4.4  --  4.3  --  4.0   < > 4.3   < > 4.2  --  4.2   CHLORIDE  --   --   --   --  102  --  103  --  101   < > 102   < > 103  --  106   CO2  --   --   --   --  30*  --  29  --  27   < > 22   < > 28  --  27   BUN  --   --   --   --  15.3  --  19.1  --  22.7   < > 17.6   < > 11.6  --  14.3   CR  --   --   --   --  0.79  --  0.94  --  1.03   < > 0.87   < > 0.67  --  0.72   * 103* 222*   < > 159*   < > 141*   < > 190*   < > 213*   < > 91   < > 89   KARLIE  --   --   --   --  8.4*  --  8.1*  --  8.0*   < > 9.5   < > 8.6*  --  8.3*   MAG  --   --   --   --   --   --   --   --   --   --  2.3  --  1.9  --  2.0    < > = values in this interval not displayed.           No results for input(s): \"CULT\" in the last 168 hours.      Recent Results (from the past 48 hour(s))   XR Abdomen 1 View    Narrative    XR ABDOMEN 1 VIEW 12/18/2023 6:19 PM    HISTORY: Distended abdomen    COMPARISON: None.      Impression    IMPRESSION: Large amount of stool throughout the colon consistent with  constipation. No obstruction.    ES KEANE MD         SYSTEM ID:  [id number]         "

## 2023-12-21 ENCOUNTER — APPOINTMENT (OUTPATIENT)
Dept: CT IMAGING | Facility: CLINIC | Age: 78
DRG: 190 | End: 2023-12-21
Attending: INTERNAL MEDICINE
Payer: MEDICARE

## 2023-12-21 ENCOUNTER — APPOINTMENT (OUTPATIENT)
Dept: PHYSICAL THERAPY | Facility: CLINIC | Age: 78
DRG: 190 | End: 2023-12-21
Payer: MEDICARE

## 2023-12-21 LAB
ANION GAP SERPL CALCULATED.3IONS-SCNC: 6 MMOL/L (ref 7–15)
BACTERIA SPT CULT: NORMAL
BASE EXCESS BLDV CALC-SCNC: 10.6 MMOL/L (ref -7.7–1.9)
BUN SERPL-MCNC: 11.1 MG/DL (ref 8–23)
CALCIUM SERPL-MCNC: 8.6 MG/DL (ref 8.8–10.2)
CHLORIDE SERPL-SCNC: 98 MMOL/L (ref 98–107)
CREAT SERPL-MCNC: 0.76 MG/DL (ref 0.67–1.17)
DEPRECATED HCO3 PLAS-SCNC: 33 MMOL/L (ref 22–29)
EGFRCR SERPLBLD CKD-EPI 2021: >90 ML/MIN/1.73M2
ERYTHROCYTE [DISTWIDTH] IN BLOOD BY AUTOMATED COUNT: 15 % (ref 10–15)
GLUCOSE BLDC GLUCOMTR-MCNC: 137 MG/DL (ref 70–99)
GLUCOSE BLDC GLUCOMTR-MCNC: 137 MG/DL (ref 70–99)
GLUCOSE BLDC GLUCOMTR-MCNC: 148 MG/DL (ref 70–99)
GLUCOSE BLDC GLUCOMTR-MCNC: 80 MG/DL (ref 70–99)
GLUCOSE SERPL-MCNC: 86 MG/DL (ref 70–99)
GRAM STAIN RESULT: NORMAL
HCO3 BLDV-SCNC: 37 MMOL/L (ref 21–28)
HCT VFR BLD AUTO: 39.3 % (ref 40–53)
HGB BLD-MCNC: 12.3 G/DL (ref 13.3–17.7)
MCH RBC QN AUTO: 30.4 PG (ref 26.5–33)
MCHC RBC AUTO-ENTMCNC: 31.3 G/DL (ref 31.5–36.5)
MCV RBC AUTO: 97 FL (ref 78–100)
MRSA DNA SPEC QL NAA+PROBE: NEGATIVE
O2/TOTAL GAS SETTING VFR VENT: 37 %
PCO2 BLDV: 58 MM HG (ref 40–50)
PH BLDV: 7.42 [PH] (ref 7.32–7.43)
PLATELET # BLD AUTO: 110 10E3/UL (ref 150–450)
PO2 BLDV: 38 MM HG (ref 25–47)
POTASSIUM SERPL-SCNC: 4 MMOL/L (ref 3.4–5.3)
PROCALCITONIN SERPL IA-MCNC: 3.04 NG/ML
RBC # BLD AUTO: 4.05 10E6/UL (ref 4.4–5.9)
SA TARGET DNA: NEGATIVE
SODIUM SERPL-SCNC: 137 MMOL/L (ref 135–145)
WBC # BLD AUTO: 12.5 10E3/UL (ref 4–11)

## 2023-12-21 PROCEDURE — 250N000013 HC RX MED GY IP 250 OP 250 PS 637: Performed by: HOSPITALIST

## 2023-12-21 PROCEDURE — 99233 SBSQ HOSP IP/OBS HIGH 50: CPT | Performed by: HOSPITALIST

## 2023-12-21 PROCEDURE — 120N000001 HC R&B MED SURG/OB

## 2023-12-21 PROCEDURE — 94640 AIRWAY INHALATION TREATMENT: CPT | Mod: 76

## 2023-12-21 PROCEDURE — 82803 BLOOD GASES ANY COMBINATION: CPT | Performed by: HOSPITALIST

## 2023-12-21 PROCEDURE — 250N000013 HC RX MED GY IP 250 OP 250 PS 637: Performed by: INTERNAL MEDICINE

## 2023-12-21 PROCEDURE — 250N000013 HC RX MED GY IP 250 OP 250 PS 637: Performed by: STUDENT IN AN ORGANIZED HEALTH CARE EDUCATION/TRAINING PROGRAM

## 2023-12-21 PROCEDURE — 250N000012 HC RX MED GY IP 250 OP 636 PS 637: Performed by: HOSPITALIST

## 2023-12-21 PROCEDURE — 71250 CT THORAX DX C-: CPT | Mod: MG

## 2023-12-21 PROCEDURE — 99232 SBSQ HOSP IP/OBS MODERATE 35: CPT | Performed by: INTERNAL MEDICINE

## 2023-12-21 PROCEDURE — 85027 COMPLETE CBC AUTOMATED: CPT | Performed by: HOSPITALIST

## 2023-12-21 PROCEDURE — 97530 THERAPEUTIC ACTIVITIES: CPT | Mod: GP

## 2023-12-21 PROCEDURE — 80048 BASIC METABOLIC PNL TOTAL CA: CPT | Performed by: HOSPITALIST

## 2023-12-21 PROCEDURE — 999N000157 HC STATISTIC RCP TIME EA 10 MIN

## 2023-12-21 PROCEDURE — 84145 PROCALCITONIN (PCT): CPT | Performed by: INTERNAL MEDICINE

## 2023-12-21 PROCEDURE — 36415 COLL VENOUS BLD VENIPUNCTURE: CPT | Performed by: HOSPITALIST

## 2023-12-21 PROCEDURE — 94640 AIRWAY INHALATION TREATMENT: CPT

## 2023-12-21 PROCEDURE — 250N000011 HC RX IP 250 OP 636: Mod: JZ | Performed by: HOSPITALIST

## 2023-12-21 PROCEDURE — 87641 MR-STAPH DNA AMP PROBE: CPT | Performed by: HOSPITALIST

## 2023-12-21 PROCEDURE — 97116 GAIT TRAINING THERAPY: CPT | Mod: GP

## 2023-12-21 PROCEDURE — 87640 STAPH A DNA AMP PROBE: CPT | Performed by: HOSPITALIST

## 2023-12-21 PROCEDURE — 250N000009 HC RX 250: Performed by: STUDENT IN AN ORGANIZED HEALTH CARE EDUCATION/TRAINING PROGRAM

## 2023-12-21 PROCEDURE — 87070 CULTURE OTHR SPECIMN AEROBIC: CPT | Performed by: HOSPITALIST

## 2023-12-21 RX ORDER — TAMSULOSIN HYDROCHLORIDE 0.4 MG/1
0.4 CAPSULE ORAL DAILY
Status: DISCONTINUED | OUTPATIENT
Start: 2023-12-21 | End: 2024-01-10 | Stop reason: HOSPADM

## 2023-12-21 RX ORDER — PIPERACILLIN SODIUM, TAZOBACTAM SODIUM 4; .5 G/20ML; G/20ML
4.5 INJECTION, POWDER, LYOPHILIZED, FOR SOLUTION INTRAVENOUS EVERY 6 HOURS
Status: COMPLETED | OUTPATIENT
Start: 2023-12-21 | End: 2023-12-28

## 2023-12-21 RX ORDER — ENOXAPARIN SODIUM 100 MG/ML
40 INJECTION SUBCUTANEOUS EVERY 24 HOURS
Status: DISCONTINUED | OUTPATIENT
Start: 2023-12-21 | End: 2024-01-10 | Stop reason: HOSPADM

## 2023-12-21 RX ADMIN — IPRATROPIUM BROMIDE AND ALBUTEROL SULFATE 3 ML: .5; 3 SOLUTION RESPIRATORY (INHALATION) at 15:40

## 2023-12-21 RX ADMIN — ENOXAPARIN SODIUM 40 MG: 40 INJECTION SUBCUTANEOUS at 10:39

## 2023-12-21 RX ADMIN — TRAZODONE HYDROCHLORIDE 50 MG: 50 TABLET ORAL at 22:04

## 2023-12-21 RX ADMIN — CYCLOBENZAPRINE 10 MG: 10 TABLET, FILM COATED ORAL at 08:10

## 2023-12-21 RX ADMIN — DULOXETINE HYDROCHLORIDE 20 MG: 20 CAPSULE, DELAYED RELEASE ORAL at 22:05

## 2023-12-21 RX ADMIN — LEVOTHYROXINE SODIUM 125 MCG: 125 TABLET ORAL at 08:09

## 2023-12-21 RX ADMIN — IPRATROPIUM BROMIDE AND ALBUTEROL SULFATE 3 ML: .5; 3 SOLUTION RESPIRATORY (INHALATION) at 07:54

## 2023-12-21 RX ADMIN — ASPIRIN 81 MG: 81 TABLET, COATED ORAL at 22:05

## 2023-12-21 RX ADMIN — TAMSULOSIN HYDROCHLORIDE 0.4 MG: 0.4 CAPSULE ORAL at 08:18

## 2023-12-21 RX ADMIN — ASPIRIN 81 MG: 81 TABLET, COATED ORAL at 08:10

## 2023-12-21 RX ADMIN — DOCUSATE SODIUM 50 MG AND SENNOSIDES 8.6 MG 2 TABLET: 8.6; 5 TABLET, FILM COATED ORAL at 08:09

## 2023-12-21 RX ADMIN — OXYCODONE HYDROCHLORIDE 5 MG: 5 TABLET ORAL at 22:04

## 2023-12-21 RX ADMIN — IPRATROPIUM BROMIDE AND ALBUTEROL SULFATE 3 ML: .5; 3 SOLUTION RESPIRATORY (INHALATION) at 19:03

## 2023-12-21 RX ADMIN — PANTOPRAZOLE SODIUM 40 MG: 40 TABLET, DELAYED RELEASE ORAL at 08:10

## 2023-12-21 RX ADMIN — ROSUVASTATIN CALCIUM 10 MG: 10 TABLET, FILM COATED ORAL at 22:05

## 2023-12-21 RX ADMIN — FUROSEMIDE 20 MG: 20 TABLET ORAL at 08:09

## 2023-12-21 RX ADMIN — LIDOCAINE 2 PATCH: 4 PATCH TOPICAL at 08:11

## 2023-12-21 RX ADMIN — GABAPENTIN 400 MG: 100 CAPSULE ORAL at 08:09

## 2023-12-21 RX ADMIN — ACETAMINOPHEN 1000 MG: 500 TABLET, FILM COATED ORAL at 13:58

## 2023-12-21 RX ADMIN — INSULIN ASPART 1 UNITS: 100 INJECTION, SOLUTION INTRAVENOUS; SUBCUTANEOUS at 17:22

## 2023-12-21 RX ADMIN — PREDNISONE 40 MG: 20 TABLET ORAL at 08:10

## 2023-12-21 RX ADMIN — OXYCODONE HYDROCHLORIDE 5 MG: 5 TABLET ORAL at 08:09

## 2023-12-21 RX ADMIN — POLYETHYLENE GLYCOL 3350 17 G: 17 POWDER, FOR SOLUTION ORAL at 08:09

## 2023-12-21 RX ADMIN — AMLODIPINE BESYLATE 5 MG: 5 TABLET ORAL at 22:05

## 2023-12-21 RX ADMIN — CYCLOBENZAPRINE 10 MG: 10 TABLET, FILM COATED ORAL at 22:05

## 2023-12-21 RX ADMIN — IPRATROPIUM BROMIDE AND ALBUTEROL SULFATE 3 ML: .5; 3 SOLUTION RESPIRATORY (INHALATION) at 11:51

## 2023-12-21 RX ADMIN — ACETAMINOPHEN 1000 MG: 500 TABLET, FILM COATED ORAL at 22:05

## 2023-12-21 RX ADMIN — ACETAMINOPHEN 1000 MG: 500 TABLET, FILM COATED ORAL at 08:10

## 2023-12-21 RX ADMIN — GABAPENTIN 400 MG: 100 CAPSULE ORAL at 22:05

## 2023-12-21 RX ADMIN — PIPERACILLIN AND TAZOBACTAM 4.5 G: 4; .5 INJECTION, POWDER, FOR SOLUTION INTRAVENOUS at 22:13

## 2023-12-21 RX ADMIN — DULOXETINE HYDROCHLORIDE 20 MG: 20 CAPSULE, DELAYED RELEASE ORAL at 08:09

## 2023-12-21 RX ADMIN — GABAPENTIN 400 MG: 100 CAPSULE ORAL at 16:43

## 2023-12-21 RX ADMIN — PIPERACILLIN AND TAZOBACTAM 4.5 G: 4; .5 INJECTION, POWDER, FOR SOLUTION INTRAVENOUS at 10:39

## 2023-12-21 RX ADMIN — PIPERACILLIN AND TAZOBACTAM 4.5 G: 4; .5 INJECTION, POWDER, FOR SOLUTION INTRAVENOUS at 16:43

## 2023-12-21 RX ADMIN — METOPROLOL SUCCINATE 50 MG: 50 TABLET, EXTENDED RELEASE ORAL at 22:05

## 2023-12-21 ASSESSMENT — ACTIVITIES OF DAILY LIVING (ADL)
ADLS_ACUITY_SCORE: 32
ADLS_ACUITY_SCORE: 30
ADLS_ACUITY_SCORE: 32
ADLS_ACUITY_SCORE: 31
ADLS_ACUITY_SCORE: 30
ADLS_ACUITY_SCORE: 37
ADLS_ACUITY_SCORE: 32
ADLS_ACUITY_SCORE: 37
ADLS_ACUITY_SCORE: 31
ADLS_ACUITY_SCORE: 32

## 2023-12-21 NOTE — PLAN OF CARE
"Goal Outcome Evaluation:    DATE & TIME: 12/20/2023 [id number]  Cognitive Concerns/ Orientation: A&Ox4  BEHAVIOR & AGGRESSION TOOL COLOR: Green  ABNL VS/O2: VSS except on HFNC 40L 50% sats 93-95%, recovery period prolonged when up to BSC, but states he \"feels a little better\"  MOBILITY: Asstx1-2 w/ GB/walker pivot/transfer to BSC  PAIN MANAGMENT: PRN oxycodone given x1, lidocaine patches (2) removed from back per orders, ice packs offered  DIET: Regular  BOWEL/BLADDER: Continent, passing flatus, BS audible, BS >900, assisted to BSC w/ no effect, straight cath'd  DRAIN/DEVICES: PIV SL  SKIN: Scattered bruising & scabs  TESTS/PROCEDURES: repeat chest CT on Thursday  D/C DAY/GOALS/PLACE: Home with homecare pending clinical improvement     OTHER IMPORTANT INFO: Pulmonology following. Straight cath'd. More alert, less lethargic & less anxious during shift. Completed IS & flutter valve. Continues w/ abd muscle use w/ breathing. Straight cath'd (see note below) for 900.     On-call hospitalist notified at 2250pm, pt retaining urinary, BS >900, need orders placed.            "

## 2023-12-21 NOTE — PLAN OF CARE
Summary: Shortness of breath due to COPD exacerbation     DATE & TIME: 12/20-12/21/23 Night shift  Cognitive Concerns/ Orientation: AOx4; pleasant  BEHAVIOR & AGGRESSION TOOL COLOR: Green  ABNL VS/O2: VSS; on HFNC at 50L 40% O2 due to increased need and SOB  MOBILITY: SBA with cane  PAIN MANAGMENT: Declined pain medication  DIET: Regular- ate apple last night  BOWEL/BLADDER: Continent. Strict I and Os; still feels full; distended, abdomen hard  ABNORMAL LABS: NA  DRAIN/DEVICES: Left wrist PIV SL  TELEMETRY RHYTHM: NA  SKIN: Scattered bruising and scabs  TESTS/PROCEDURES: chest CT done this AM  D/C DAY/GOALS/PLACE: Patient remains on HFNC; RR up to 40-50s with any movement; constant audible wheezing  OTHER IMPORTANT INFO: Pulmonology following.        Goal Outcome Evaluation:

## 2023-12-21 NOTE — PLAN OF CARE
Goal Outcome Evaluation:     DATE & TIME: 12/21/2023 [id number]  Cognitive Concerns/ Orientation: A&Ox4  BEHAVIOR & AGGRESSION TOOL COLOR: Green  ABNL VS/O2: VSS except desats with activity, prolonged recovery, at rest sats improved 91-93% on 1L   MOBILITY: SBA in , up to BSC, refuses bed alarm  PAIN MANAGMENT: PRN oxycodone & PRN flexeril at HS, lidocaine patches not intact  DIET: Mod Carb diet, BG AC & HS  BOWEL/BLADDER: had incontinence large BM while attempting to self transfer, martinez patent, philipp colored urine  DRAIN/DEVICES: New PIV to L forearm, intermittent zosyn   SKIN: Scattered bruising & scabs  TESTS/PROCEDURES: repeat chest CT, started on zosyn for pneumonia  D/C DAY/GOALS/PLACE: Home with homecare pending clinical improvement     OTHER: MRSA swab negative, received call from lab that sputum sample was contaminated & needed to be recollected, awaiting sample from patient at this time.  & 137. Continues w/ wet productive cough. Ambulated w/ PT in hallway earlier today. Partial bath given after incontinence episode w/ Large BM.

## 2023-12-21 NOTE — PLAN OF CARE
Goal Outcome Evaluation:    DATE & TIME: 12/20-12/21/23 [id number]  Cognitive Concerns/ Orientation: AOx4; pleasant  BEHAVIOR & AGGRESSION TOOL COLOR: Green  ABNL VS/O2: VSS; on Was on HFNC at 50L 35% O2 due to increased need and SOB. Removed Hi-Flow to eat. Refused to put back on.  Compromised with NC.  Sating 91% on 3L  MOBILITY: A1 with GB/walker  PAIN MANAGMENT: Flexiril and oxycodone given for lower back pain.  DIET: Regular- ate apple last night  BOWEL/BLADDER: Mir placed for retention. Strict I&Os; Large BM this shift  ABNORMAL LABS: WBC 12.5, CO2 58, Bicarb 37. Procal 3.04  DRAIN/DEVICES: Right wrist PIV SL  TELEMETRY RHYTHM: NA  SKIN: Scattered bruising and scabs  TESTS/PROCEDURES: chest CT done this AM  D/C DAY/GOALS/PLACE: Patient remains on HFNC; RR up to 20-30s with any movement; constant audible wheezing  OTHER IMPORTANT INFO: CT shows new opacities. Zosyn started. MRSA/MSSA PCR collected, sputum collected. Results pending. Pulmonology following.     Plan of Care Reviewed With: patient    Overall Patient Progress: decliningOverall Patient Progress: declining

## 2023-12-21 NOTE — PROGRESS NOTES
[facility]    Pulmonary, Allergy, Critical Care and Sleep Medicine    Pulmonary Consult Progress Note    Harrison Thomas MRN# [id number]   Age: 78 year old YOB: 1945     Date of Admission: 12/6/2023  Date of Service: 12/21/2023     ==================================================  INTERVAL EVENTS:  -Remains on high flow nasal cannula, 40 to 50% yesterday, now improved to 35%  -Remains afebrile, though possible uptrend in temperature curve  -Repeat CT today with new scattered dense infiltrates concerning for infection      CHANGES FOR TODAY:  -Add on procalcitonin  -Continue prednisone  -Begin antibiotics for hospital-acquired pneumonia    ==================================================    ASSESSMENT AND RECOMMENDATIONS:    Harrison Thomas is a 78 year old male with a history of Moderate COPD, 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     ## Acute Hypoxic Respiratory Failure  ## Moderate COPD with Exacerbation  ## Right upper lobe lung mass status post radiation treatment with possible chronic radiation changes versus radiation pneumonitis  ## Seropositive RA (previously on methotrexate and prednisone, now off)  ## Moderate sleep apnea (AHI 20 in 2018)    History of right upper lobe mass without clear etiology after bronchoscopy and VATS biopsy.  Underwent empiric radiation therapy in mid 2022.      Admitted following increased home use of oxygen and rescue therapies. Initially required 5L but has weaned down to minimal to no oxygen use.  BNP and procalcitonin negative on admission.  Chest imaging largely stable from prior with some bronchial wall thickening and chronic right upper lobe consolidation which was initially stable, but then increased on 12/18.  Repeat CT 12/21 with dense scattered  "bilateral infiltrates new from CT on 12/11.  Worsening oxygen requirements, now requiring high flow nasal cannula.  Persistent severe wheezing with dependence on steroids, though slightly improved on 12/20. Scheduled bronchodilators, mucolytic and flutter valve given wheezing and report of difficulty clearing some sputum.  Respiratory viral panel negative.  Sniff test within normal limits. Abdominal distention likely impacting breathing.    At this time it does not appear that his COPD qualifies him for home positive pressure therapy, however he does have a history of BRENNEN which would qualify him, though his last sleep study was in 2018 so this will likely need to be repeated.    No worsening in his symptoms or oxygen requirements since decreasing prednisone    -Follow-up with sleep medicine after discharge  -Duonebs with Mucomyst QID  -Flutter valve following nebs QID  -Agree with current steroids  -Agree with starting costa Mcneil M.D.  Pulmonary & Critical Care  Pager: Click Here to page      I spent 36 minutes dedicated to this care so far today excluding procedures, including review of medical records, review of imaging (results & images), time with patient and time in documentation.    ==================================================      PHYSICAL EXAM  /70 (BP Location: Right arm)   Pulse 79   Temp 98.9  F (37.2  C) (Oral)   Resp 17   Ht 1.778 m (5' 10\")   Wt 81.3 kg (179 lb 3.2 oz)   SpO2 95%   BMI 25.71 kg/m        Intake/Output Summary (Last 24 hours) at 12/21/2023 1049  Last data filed at 12/21/2023 1000  Gross per 24 hour   Intake 840 ml   Output 900 ml   Net -60 ml           Vitals:    12/06/23 2200 12/18/23 0900 12/19/23 1700   Weight: 82.1 kg (181 lb) 83.2 kg (183 lb 6.4 oz) 81.3 kg (179 lb 3.2 oz)         General: Alert, interactive, more dyspneic appearing at rest but states that he doesn't feel worse  Resp: Predominant low pitched wheezing throughout, slightly " worse from y'day  Cardiac: RRR, NS1,S2, No m/r/g  Abdomen: Firm (but not rigid), though less so than yesterday, distended, tympanic to percussion with active bowel sounds  Extremities: Trace lower extremity edema  Skin: Warm and dry, no jaundice or rash  Neuro: Alert & oriented x 3, Cns 2-12 intact, moves all extremities equally      CT chest 12/21/2023  1.  Several small patchy opacities scattered within bilateral lower lobes and less so the right middle lobe, new since 12/11/2023. These likely represent pneumonia.  2.  Mild diffuse bronchial wall thickening, most likely infectious or inflammatory in etiology.  3.  Moderate to severe emphysematous changes in the lungs.    Chest x-ray 12/18  Increased consolidation in the right upper lobe since  comparison. Lungs otherwise clear.       Recent Labs   Lab Test 12/18/23  1313 12/17/23  0839 12/16/23  1112   WBC 15.9* 13.5* 14.9*   RBC 4.43 4.44 4.37*   HGB 13.3 13.4 13.3   * 115* 134*       Recent Labs   Lab Test 12/21/23  0810 12/20/23  2136 12/20/23  1703 12/19/23  0747 12/18/23  1313 12/17/23  1109 12/17/23  0839 12/17/23  0154 12/16/23  1112 12/07/23  1652 12/06/23  1801 06/17/23  0814 06/17/23  0704 06/16/23  1217 06/16/23  0707   NA  --   --   --   --  141  --  140  --  138   < > 139   < > 138  --  140   POTASSIUM  --   --   --   --  4.4  --  4.3  --  4.0   < > 4.3   < > 4.2  --  4.2   CHLORIDE  --   --   --   --  102  --  103  --  101   < > 102   < > 103  --  106   CO2  --   --   --   --  30*  --  29  --  27   < > 22   < > 28  --  27   BUN  --   --   --   --  15.3  --  19.1  --  22.7   < > 17.6   < > 11.6  --  14.3   CR  --   --   --   --  0.79  --  0.94  --  1.03   < > 0.87   < > 0.67  --  0.72   GLC 80 152* 114*   < > 159*   < > 141*   < > 190*   < > 213*   < > 91   < > 89   KARLIE  --   --   --   --  8.4*  --  8.1*  --  8.0*   < > 9.5   < > 8.6*  --  8.3*   MAG  --   --   --   --   --   --   --   --   --   --  2.3  --  1.9  --  2.0    < > = values in this  "interval not displayed.           No results for input(s): \"CULT\" in the last 168 hours.      Recent Results (from the past 48 hour(s))   CT Chest w/o Contrast    Narrative    EXAM: CT CHEST WITHOUT CONTRAST  LOCATION: [facility]  DATE/TIME: 12/21/2023 6:32 AM CST    INDICATION: History of a right upper lobe lung cancer status post radiation. Persistent hypoxic respiratory failure.  COMPARISON: 12/11/2023.    TECHNIQUE: CT chest without IV contrast. Multiplanar reformats were obtained. Dose reduction techniques were used.  CONTRAST: None.    FINDINGS:    LUNGS AND PLEURA: Moderate to severe emphysematous changes in the lungs. A band-like opacity in the upper right lung (series 6 image 66), unchanged since the prior study and likely representing the site of the right upper lobe lung cancer described in   the clinical history status post treatment. Several small patchy opacities are scattered within bilateral lower lobes and less so the right middle lobe, new since 12/11/2023. A few less than 0.6 cm diameter nodules scattered within both lungs, unchanged.   Mild diffuse bronchial wall thickening.    MEDIASTINUM/AXILLAE: Atherosclerotic calcification in the thoracic aorta.    CORONARY ARTERY CALCIFICATION: Present.    MUSCULOSKELETAL: No acute findings.    UPPER ABDOMEN: A few tiny probable gallstones in the gallbladder.      Impression    IMPRESSION:   1.  Several small patchy opacities scattered within bilateral lower lobes and less so the right middle lobe, new since 12/11/2023. These likely represent pneumonia.  2.  Mild diffuse bronchial wall thickening, most likely infectious or inflammatory in etiology.  3.  Moderate to severe emphysematous changes in the lungs.         "

## 2023-12-21 NOTE — PROGRESS NOTES
[facility]    Medicine Progress Note - Hospitalist Service    Date of Admission:  12/6/2023    Assessment & Plan   Harrison Thomas is a 78 year old male admitted on 12/6/2023. past medical history significant for chronic hypoxic respiratory failure [has oxygen as needed at home], COPD, RUL lung mass s/p radiation, untreated BRENNEN, HTN, HLD, CAD, hypothyroidism admitted on 12/6/2023 with acute on chronic hypoxic respiratory failure likely 2/2 COPD exacerbation.      COPD exacerbation  cute on Chronic hypoxemic respiratory failure   Lung mass s/p radiation treatment and probable radiation pneumonitis   BRENNEN   *Follows with Dr. Handy with pulmonology at [facility], last seen in September 2023.  On low dose chronic prednisone (2mg)  *Pt presents to the ED with increased shortness of breath over the past week, increased oxygen use at home  *admission clinical picture consistent with COPD exacerbation - poor air movement with diffuse expiratory wheezing - placed on steroids and nebs  *BNP and procalcitonin wnl, COVID/flu/RSV negative on admission, resp viral panel negative (12/15)  *admission CXR showed just faint residual opacities in the RUL corresponding to RUL mass and post-treatment changes.  *CT pulm angio 12/11 negative for PE, shows mild bronchial wall thickening and extensive emphysema  *underwent sniff test this admission that was negative.  *received 5-day treatment course azithromycin at admission, resumed  mg every other day dosing for prophylaxis  *significantly increased oxygen needs 12/20 necessitating HFNC and Bipap  *Pulm consulted and following  *transitioned from IV solumedrol to prednisone 12/20    - continue PTA azithromycin 500 mg every other day  - continue prednisone 40 mg daily  - duonebs qid  - home inhaler on hold  - Continue PTA Lasix 20 mg daily  - encourage IS and flutter valve   - remains on HFNC and Bipap alternating  - repeat procal 12/21 up to 3.04 (from 0.06  on 12/6) and CT chest 12/21 showing new small patchy opacities of bilateral lower lobes and RML, mild bronchial thickening  - start zosyn, check MRSA nasal swab and obtain sputum for culture 12/21/23   - ambulatory oximetry prior to discharge to qualify for home oxygen  - follow up with sleep medicine at discharge for review of BRENNEN and qualification of CPAP  - Pulfrankc christensen much appreciated     Hypertension   Hyperlipidemia  Coronary artery disease   - COntinue PTA metoprolol and amlodipine, aspirin, rosuvastatin and lasix     Chronic low back pain   *Pt is supposed to be getting lumbar MRI soon.  Can be done as an outpatient.  Continue PTA oxycodone  Gabapentin changed from 600 mg bid to 400 mg tid, continue  Hold naproxen for now in the setting of high-dose steroids since he would be at high risk for upper GI bleed and ulcers.  Oral PPI for prophylaxis  Flexeril 10 mg 3 times daily PRN   Scheduled Tylenol tid  lidocaine patches     Constipation  *Given concern for abdominal distention, obtained abdominal x-ray on 12/18 that still showed large amount of stool throughout the colon.  Large BM on 12/19  - Continue on aggressive bowel regimen  - reports having BM evening of 12/20 but not charted - discussed he should request prn to maintain daily BM's    Urinary retention  *required straight cath x3 12/20-12/21 and martinez placed  - start flomax 12/21/23   - continue martinez x48 hours, then voiding trial  - aggressively treat constipation as above     Hx of papillary thyroid cancer s/p thyroidectomy   Hypothyroidism   - Continue PTA levothyroxine      Insomnia   - Continue TPA Trazodone      Leukocytosis  -Secondary to steroids  - WBC stable 12/21/23      Thrombocytopenia  PC in low 100s.  Not on any heparin products on admission through 12/20.  Is on baby aspirin twice daily, no signs of bleeding.  - platelet stable 12/21/23   - start lovenox ppx 12/21/23     Bilateral pulmonary nodules  CT chest 12/11 showing scattered small  "bilateral pulmonary nodules measuring up to 5 mm are unchanged  - Continued surveillance recommended.             Diet: Moderate Consistent Carb (60 g CHO per Meal) Diet    DVT Prophylaxis: Enoxaparin (Lovenox) SQ  Mir Catheter: PRESENT, indication: Retention  Lines: None     Cardiac Monitoring: None  Code Status: No CPR- Do NOT Intubate      Clinically Significant Risk Factors              # Hypoalbuminemia: Lowest albumin = 3.4 g/dL at 12/8/2023 11:58 AM, will monitor as appropriate   # Thrombocytopenia: Lowest platelets = 110 in last 2 days, will monitor for bleeding   # Hypertension: Noted on problem list        # Overweight: Estimated body mass index is 25.71 kg/m  as calculated from the following:    Height as of this encounter: 1.778 m (5' 10\").    Weight as of this encounter: 81.3 kg (179 lb 3.2 oz).             Disposition Plan      Expected Discharge Date: 12/25/2023        Discharge Comments: pt is on iv solumedrol            Richy Jett MD  Hospitalist Service  [facility]  Securely message with [employer] (more info)  Text page via [employer] Paging/Directory   ______________________________________________________________________    Interval History   Feeling very short of breath early this morning, feeling slightly better now.  Ongoing wheezing, mildly productive cough.  NO fever/chills.     Physical Exam   Vital Signs: Temp: 98.9  F (37.2  C) Temp src: Oral BP: 139/70 Pulse: 79   Resp: 20 SpO2: 95 % O2 Device: BiPAP/CPAP Oxygen Delivery: 50 LPM  Weight: 179 lbs 3.2 oz    General Appearance: Well nourished male in NAD  Respiratory: diminished breath sounds throughout with prolonged expiratory phase, mild wheezing with few crackles of left lower lobe  Cardiovascular: RRR, normal s1/s2  GI: abdomen soft, normal bowel sounds  Skin: no peripheral edema   Other: Alert and appropriate, CN grossly intact      Medical Decision Making       50 MINUTES SPENT BY ME on the date of service " doing chart review, history, exam, documentation & further activities per the note.      Data     I have personally reviewed the following data over the past 24 hrs:    12.5 (H)  \   12.3 (L)   / 110 (L)     137 98 11.1 /  86   4.0 33 (H) 0.76 \     Procal: 3.04 (H) CRP: N/A Lactic Acid: N/A         Imaging results reviewed over the past 24 hrs:   Recent Results (from the past 24 hour(s))   CT Chest w/o Contrast    Narrative    EXAM: CT CHEST WITHOUT CONTRAST  LOCATION: [facility]  DATE/TIME: 12/21/2023 6:32 AM CST    INDICATION: History of a right upper lobe lung cancer status post radiation. Persistent hypoxic respiratory failure.  COMPARISON: 12/11/2023.    TECHNIQUE: CT chest without IV contrast. Multiplanar reformats were obtained. Dose reduction techniques were used.  CONTRAST: None.    FINDINGS:    LUNGS AND PLEURA: Moderate to severe emphysematous changes in the lungs. A band-like opacity in the upper right lung (series 6 image 66), unchanged since the prior study and likely representing the site of the right upper lobe lung cancer described in   the clinical history status post treatment. Several small patchy opacities are scattered within bilateral lower lobes and less so the right middle lobe, new since 12/11/2023. A few less than 0.6 cm diameter nodules scattered within both lungs, unchanged.   Mild diffuse bronchial wall thickening.    MEDIASTINUM/AXILLAE: Atherosclerotic calcification in the thoracic aorta.    CORONARY ARTERY CALCIFICATION: Present.    MUSCULOSKELETAL: No acute findings.    UPPER ABDOMEN: A few tiny probable gallstones in the gallbladder.      Impression    IMPRESSION:   1.  Several small patchy opacities scattered within bilateral lower lobes and less so the right middle lobe, new since 12/11/2023. These likely represent pneumonia.  2.  Mild diffuse bronchial wall thickening, most likely infectious or inflammatory in etiology.  3.  Moderate to severe  emphysematous changes in the lungs.

## 2023-12-21 NOTE — PROGRESS NOTES
Writer received call from [facility] micro lab that patient's sputum sample was contaminated w/ mouth ana luisa, will need another sample.

## 2023-12-22 LAB
GLUCOSE BLDC GLUCOMTR-MCNC: 151 MG/DL (ref 70–99)
GLUCOSE BLDC GLUCOMTR-MCNC: 166 MG/DL (ref 70–99)
GLUCOSE BLDC GLUCOMTR-MCNC: 199 MG/DL (ref 70–99)
GLUCOSE BLDC GLUCOMTR-MCNC: 92 MG/DL (ref 70–99)

## 2023-12-22 PROCEDURE — 250N000013 HC RX MED GY IP 250 OP 250 PS 637: Performed by: STUDENT IN AN ORGANIZED HEALTH CARE EDUCATION/TRAINING PROGRAM

## 2023-12-22 PROCEDURE — 250N000011 HC RX IP 250 OP 636: Performed by: HOSPITALIST

## 2023-12-22 PROCEDURE — 99232 SBSQ HOSP IP/OBS MODERATE 35: CPT | Performed by: HOSPITALIST

## 2023-12-22 PROCEDURE — 250N000013 HC RX MED GY IP 250 OP 250 PS 637: Performed by: INTERNAL MEDICINE

## 2023-12-22 PROCEDURE — 250N000013 HC RX MED GY IP 250 OP 250 PS 637: Performed by: HOSPITALIST

## 2023-12-22 PROCEDURE — 99232 SBSQ HOSP IP/OBS MODERATE 35: CPT | Performed by: INTERNAL MEDICINE

## 2023-12-22 PROCEDURE — 94640 AIRWAY INHALATION TREATMENT: CPT

## 2023-12-22 PROCEDURE — 250N000009 HC RX 250: Performed by: STUDENT IN AN ORGANIZED HEALTH CARE EDUCATION/TRAINING PROGRAM

## 2023-12-22 PROCEDURE — 999N000157 HC STATISTIC RCP TIME EA 10 MIN

## 2023-12-22 PROCEDURE — 94640 AIRWAY INHALATION TREATMENT: CPT | Mod: 76

## 2023-12-22 PROCEDURE — 87106 FUNGI IDENTIFICATION YEAST: CPT | Performed by: HOSPITALIST

## 2023-12-22 PROCEDURE — 250N000012 HC RX MED GY IP 250 OP 636 PS 637: Performed by: HOSPITALIST

## 2023-12-22 PROCEDURE — 120N000001 HC R&B MED SURG/OB

## 2023-12-22 RX ADMIN — DULOXETINE HYDROCHLORIDE 20 MG: 20 CAPSULE, DELAYED RELEASE ORAL at 20:36

## 2023-12-22 RX ADMIN — TRAZODONE HYDROCHLORIDE 50 MG: 50 TABLET ORAL at 21:37

## 2023-12-22 RX ADMIN — LEVOTHYROXINE SODIUM 125 MCG: 125 TABLET ORAL at 08:15

## 2023-12-22 RX ADMIN — GABAPENTIN 400 MG: 100 CAPSULE ORAL at 08:15

## 2023-12-22 RX ADMIN — PREDNISONE 40 MG: 20 TABLET ORAL at 08:15

## 2023-12-22 RX ADMIN — METOPROLOL SUCCINATE 50 MG: 50 TABLET, EXTENDED RELEASE ORAL at 20:36

## 2023-12-22 RX ADMIN — ROSUVASTATIN CALCIUM 10 MG: 10 TABLET, FILM COATED ORAL at 20:36

## 2023-12-22 RX ADMIN — PIPERACILLIN AND TAZOBACTAM 4.5 G: 4; .5 INJECTION, POWDER, FOR SOLUTION INTRAVENOUS at 16:09

## 2023-12-22 RX ADMIN — OXYCODONE HYDROCHLORIDE 5 MG: 5 TABLET ORAL at 06:27

## 2023-12-22 RX ADMIN — ASPIRIN 81 MG: 81 TABLET, COATED ORAL at 20:36

## 2023-12-22 RX ADMIN — GABAPENTIN 400 MG: 100 CAPSULE ORAL at 21:37

## 2023-12-22 RX ADMIN — POLYETHYLENE GLYCOL 3350 17 G: 17 POWDER, FOR SOLUTION ORAL at 08:16

## 2023-12-22 RX ADMIN — INSULIN ASPART 1 UNITS: 100 INJECTION, SOLUTION INTRAVENOUS; SUBCUTANEOUS at 16:27

## 2023-12-22 RX ADMIN — CYCLOBENZAPRINE 10 MG: 10 TABLET, FILM COATED ORAL at 21:37

## 2023-12-22 RX ADMIN — IPRATROPIUM BROMIDE AND ALBUTEROL SULFATE 3 ML: .5; 3 SOLUTION RESPIRATORY (INHALATION) at 15:09

## 2023-12-22 RX ADMIN — ACETAMINOPHEN 1000 MG: 500 TABLET, FILM COATED ORAL at 14:10

## 2023-12-22 RX ADMIN — LIDOCAINE 2 PATCH: 4 PATCH TOPICAL at 08:16

## 2023-12-22 RX ADMIN — IPRATROPIUM BROMIDE AND ALBUTEROL SULFATE 3 ML: .5; 3 SOLUTION RESPIRATORY (INHALATION) at 07:16

## 2023-12-22 RX ADMIN — GABAPENTIN 400 MG: 100 CAPSULE ORAL at 16:11

## 2023-12-22 RX ADMIN — FUROSEMIDE 20 MG: 20 TABLET ORAL at 08:16

## 2023-12-22 RX ADMIN — TAMSULOSIN HYDROCHLORIDE 0.4 MG: 0.4 CAPSULE ORAL at 08:15

## 2023-12-22 RX ADMIN — DOCUSATE SODIUM 50 MG AND SENNOSIDES 8.6 MG 2 TABLET: 8.6; 5 TABLET, FILM COATED ORAL at 08:15

## 2023-12-22 RX ADMIN — ASPIRIN 81 MG: 81 TABLET, COATED ORAL at 08:15

## 2023-12-22 RX ADMIN — PIPERACILLIN AND TAZOBACTAM 4.5 G: 4; .5 INJECTION, POWDER, FOR SOLUTION INTRAVENOUS at 10:39

## 2023-12-22 RX ADMIN — PIPERACILLIN AND TAZOBACTAM 4.5 G: 4; .5 INJECTION, POWDER, FOR SOLUTION INTRAVENOUS at 04:34

## 2023-12-22 RX ADMIN — OXYCODONE HYDROCHLORIDE 5 MG: 5 TABLET ORAL at 21:37

## 2023-12-22 RX ADMIN — AMLODIPINE BESYLATE 5 MG: 5 TABLET ORAL at 21:37

## 2023-12-22 RX ADMIN — DULOXETINE HYDROCHLORIDE 20 MG: 20 CAPSULE, DELAYED RELEASE ORAL at 08:16

## 2023-12-22 RX ADMIN — ACETAMINOPHEN 1000 MG: 500 TABLET, FILM COATED ORAL at 06:13

## 2023-12-22 RX ADMIN — IPRATROPIUM BROMIDE AND ALBUTEROL SULFATE 3 ML: .5; 3 SOLUTION RESPIRATORY (INHALATION) at 10:54

## 2023-12-22 RX ADMIN — INSULIN ASPART 1 UNITS: 100 INJECTION, SOLUTION INTRAVENOUS; SUBCUTANEOUS at 12:13

## 2023-12-22 RX ADMIN — IPRATROPIUM BROMIDE AND ALBUTEROL SULFATE 3 ML: .5; 3 SOLUTION RESPIRATORY (INHALATION) at 19:27

## 2023-12-22 RX ADMIN — AZITHROMYCIN DIHYDRATE 500 MG: 250 TABLET ORAL at 08:15

## 2023-12-22 RX ADMIN — ENOXAPARIN SODIUM 40 MG: 40 INJECTION SUBCUTANEOUS at 10:41

## 2023-12-22 RX ADMIN — PANTOPRAZOLE SODIUM 40 MG: 40 TABLET, DELAYED RELEASE ORAL at 06:13

## 2023-12-22 RX ADMIN — ACETAMINOPHEN 1000 MG: 500 TABLET, FILM COATED ORAL at 21:36

## 2023-12-22 RX ADMIN — CYCLOBENZAPRINE 10 MG: 10 TABLET, FILM COATED ORAL at 06:27

## 2023-12-22 ASSESSMENT — ACTIVITIES OF DAILY LIVING (ADL)
ADLS_ACUITY_SCORE: 37

## 2023-12-22 ASSESSMENT — ABNORMAL INVOLUNTARY MOVEMENT SCALE (AIMS)
UPPER_BODY_EXTREMITIES: NONE, NORMAL
LOWER_BODY_EXTREMITIES: NONE, NORMAL

## 2023-12-22 NOTE — PLAN OF CARE
Goal Outcome Evaluation:      Plan of Care Reviewed With: patient    Overall Patient Progress: improvingOverall Patient Progress: improving     Summary: Shortness of breath due to COPD exacerbation     DATE & TIME: 12/21-12/22/23; [id number]  Cognitive Concerns/ Orientation: AOx4; pleasant  BEHAVIOR & AGGRESSION TOOL COLOR: Green  ABNL VS/O2: VSS on 3L via NC; Desats to mid 80s when transferring.  Recovers in less than a minute.   MOBILITY: A1 with GB/walker  PAIN MANAGEMENT:oxycodone + flexeril given at 0630 for back pain 8/10  DIET: Regular  BOWEL/BLADDER: Mir placed for retention. Strict I&Os; Large BM 12/21  ABNORMAL LABS: WBC 12.5, CO2 58, Bicarb 37. Procal 3.04  DRAIN/DEVICES: Right wrist PIV SL; intermittent abx.   TELEMETRY RHYTHM: NA  SKIN: Scattered bruising and scabs  TESTS/PROCEDURES: None   D/C DAY/GOALS/PLACE: Patient remains on 3 L NC; RR up to 20-30s with movement; constant audible wheezing; labored breathing noted   OTHER IMPORTANT INFO: CT shows new opacities. Zosyn started. MRSA/MSSA PCR collected, sputum needs to be collected collected. Results pending. Pulmonology following.

## 2023-12-22 NOTE — PROGRESS NOTES
[facility]    Medicine Progress Note - Hospitalist Service    Date of Admission:  12/6/2023    Assessment & Plan   Harrison Thomas is a 78 year old male admitted on 12/6/2023. past medical history significant for chronic hypoxic respiratory failure [has oxygen as needed at home], COPD, RUL lung mass s/p radiation, untreated BRENNEN, HTN, HLD, CAD, hypothyroidism admitted on 12/6/2023 with acute on chronic hypoxic respiratory failure likely 2/2 COPD exacerbation.      COPD exacerbation  cute on Chronic hypoxemic respiratory failure   Lung mass s/p radiation treatment and probable radiation pneumonitis   BRENNEN   *Follows with Dr. Handy with pulmonology at [facility], last seen in September 2023.  On low dose chronic prednisone (2mg).  *Pt presents to the ED with increased shortness of breath over the past week, increased oxygen use at home.  *admission clinical picture consistent with COPD exacerbation - poor air movement with diffuse expiratory wheezing - placed on steroids and nebs.  *BNP and procalcitonin wnl, COVID/flu/RSV negative on admission, resp viral panel negative (12/15).  *admission CXR showed just faint residual opacities in the RUL corresponding to RUL mass and post-treatment changes.  *CT pulm angio 12/11 negative for PE, shows mild bronchial wall thickening and extensive emphysema.  *underwent sniff test this admission that was negative.  *received 5-day treatment course azithromycin at admission, resumed  mg every other day dosing for prophylaxis.  *significantly increased oxygen needs 12/20 necessitating HFNC and Bipap.  *Pulm consulted and following.  *Transitioned from IV solumedrol to prednisone 12/20.  - continue PTA azithromycin 500 mg every other day.  - continue prednisone 40 mg daily.  - duonebs qid.  - home inhaler on hold.  - Continue PTA Lasix 20 mg daily.  - encourage IS and flutter valve.  - Has been weaned down from HFNC to 3 lpm by nasal cannula.  - repeat  procal 12/21 up to 3.04 (from 0.06 on 12/6) and CT chest 12/21 showing new small patchy opacities of bilateral lower lobes and RML, mild bronchial thickening  - Start zosyn 12/21/23.  - MRSA nasal swab negative on 12/21/23.  - Respiratory culture obtained 12/22/23, pending.  - Ambulatory oximetry prior to discharge to qualify for home oxygen.  - Follow up with sleep medicine at discharge for review of BRENNEN and qualification of CPAP.  - Pulm recs much appreciated.     Hypertension   Hyperlipidemia  Coronary artery disease   - Continue PTA metoprolol, amlodipine, aspirin, rosuvastatin and lasix.     Chronic low back pain   *Pt is supposed to be getting lumbar MRI soon.  Can be done as an outpatient.  - Continue PTA oxycodone.  - Gabapentin changed from 600 mg bid to 400 mg tid, continue.  - Hold naproxen for now in the setting of high-dose steroids since he would be at high risk for upper GI bleed and ulcers.  - Oral PPI for prophylaxis.  - Flexeril 10 mg 3 times daily PRN.  - Scheduled Tylenol tid.  - Lidocaine patches.    Constipation  *Given concern for abdominal distention, obtained abdominal x-ray on 12/18 that still showed large amount of stool throughout the colon.  Large BM on 12/19 and 12/21.  - Continue on aggressive bowel regimen.    Urinary retention  *Required straight cath x3 12/20-12/21 and martinez placed.  - Start flomax 12/21/23.  - Continue martinez x48 hours, then voiding trial.  - Aggressively treat constipation as above.     Hx of papillary thyroid cancer s/p thyroidectomy   Hypothyroidism   - Continue PTA levothyroxine.     Insomnia   - Continue PTA Trazodone.     Leukocytosis  -Secondary to steroids  - WBC stable/improved 12/21/23.     Thrombocytopenia  PC in low 100s.  Not on any heparin products on admission through 12/20.  Is on baby aspirin twice daily, no signs of bleeding.  - Platelet stable 12/21/23, recheck in AM.  - Lovenox ppx started 12/21/23.     Bilateral pulmonary nodules  CT chest 12/11  "showing scattered small bilateral pulmonary nodules measuring up to 5 mm are unchanged  - Continued surveillance recommended.           Diet: Moderate Consistent Carb (60 g CHO per Meal) Diet    DVT Prophylaxis: Enoxaparin (Lovenox) SQ  Mir Catheter: PRESENT, indication: Retention  Lines: None     Cardiac Monitoring: None  Code Status: No CPR- Do NOT Intubate      Clinically Significant Risk Factors              # Hypoalbuminemia: Lowest albumin = 3.4 g/dL at 12/8/2023 11:58 AM, will monitor as appropriate   # Thrombocytopenia: Lowest platelets = 110 in last 2 days, will monitor for bleeding   # Hypertension: Noted on problem list        # Overweight: Estimated body mass index is 25.71 kg/m  as calculated from the following:    Height as of this encounter: 1.778 m (5' 10\").    Weight as of this encounter: 81.3 kg (179 lb 3.2 oz).             Disposition Plan      Expected Discharge Date: 12/24/2023        Discharge Comments: pt is on iv solumedrol            Tino Zabala MD  Hospitalist Service  [facility]  Securely message with [employer] (more info)  Text page via [employer] Paging/Directory   ______________________________________________________________________    Interval History   Harrison Thomas was seen this afternoon. He feels better today. Shortness of breath improving. Supplemental oxygen levels have improved significantly. Denies fevers, chest pain, nausea, abdominal pain.    Physical Exam   Vital Signs: Temp: 98.5  F (36.9  C) Temp src: Oral BP: 126/54 Pulse: 79   Resp: 20 SpO2: 93 % O2 Device: Nasal cannula Oxygen Delivery: 3 LPM  Weight: 179 lbs 3.2 oz    Constitutional: awake, alert, cooperative, appears short of breath, laying in the hospital bed with the head of the bed elevated  Respiratory: appears short of breath, wheezes  Cardiovascular: regular rate and rhythm, normal S1 and S2, no murmur noted  GI: normal bowel sounds, soft, non-distended, non-tender  Skin: warm, " dry  Musculoskeletal: no lower extremity pitting edema present  Neurologic: awake, alert, answers questions appropriately, moves all extremities    Medical Decision Making       40 MINUTES SPENT BY ME on the date of service doing chart review, history, exam, documentation & further activities per the note.      Data   NOTE: Data reviewed over the past 24 hrs contributes toward MDM complexity

## 2023-12-22 NOTE — PLAN OF CARE
Goal Outcome Evaluation:  Summary: Shortness of breath due to COPD exacerbation     DATE & TIME: 12/22/23;  [id number]  Cognitive Concerns/ Orientation: AOx4; pleasant  BEHAVIOR & AGGRESSION TOOL COLOR: Green  ABNL VS/O2: VSS on 3L via NC; Desats to mid 80s when transferring.  Recovers in less than a minute.   MOBILITY: A1 with GB/walker, up in chair for lunch, can reposition himself in bed.   PAIN MANAGMENT: c/o back pain; scheduled tylenol given  DIET: Regular  BOWEL/BLADDER: Mir placed for retention. Strict I&Os; Had BM on nights  ABNORMAL LABS: WBC 12.5, CO2 58, Bicarb 37. Procal 3.04  BGM 92/151  DRAIN/DEVICES: PIV SL; intermittent abx.   TELEMETRY RHYTHM: NA  SKIN: Scattered bruising and scabs  TESTS/PROCEDURES: None   D/C DAY/GOALS/PLACE: Patient remains on 3 liters.  Respirations up with any movement/short of breath.   OTHER IMPORTANT INFO: CT shows new opacities. Zosyn started. MRSA/MSSA PCR collected, sputum collected. Results pending. Pulmonology following.

## 2023-12-22 NOTE — PROGRESS NOTES
[facility]    Pulmonary, Allergy, Critical Care and Sleep Medicine    Pulmonary Consult Progress Note    Harrison Thomas MRN# [id number]   Age: 78 year old YOB: 1945     Date of Admission: 12/6/2023  Date of Service: 12/22/2023     ==================================================  INTERVAL EVENTS:  -Surprising improvement in oxygen requirements, was on high flow at 35% yesterday, now down to 1-3 L nasal cannula.  Symptomatically feels better today  -Remains afebrile, though is receiving acetaminophen.  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     ## Acute Hypoxic Respiratory Failure  ## Moderate COPD with Exacerbation  ## Right upper lobe lung mass status post radiation treatment with possible chronic radiation changes versus radiation pneumonitis  ## Seropositive RA (previously on methotrexate and prednisone, now off)  ## Moderate sleep apnea (AHI 20 in 2018)    History of right upper lobe mass without clear etiology after bronchoscopy and VATS biopsy.  Underwent empiric radiation therapy in mid 2022.  Outpatient pulmonologist Dr. Handy at the [facility].    Admitted following increased home use of oxygen and rescue therapies. Initially required 5L but has weaned down to minimal to no oxygen use.  BNP and procalcitonin negative on admission.  Chest imaging largely stable from prior with  "some bronchial wall thickening and chronic right upper lobe consolidation which was initially stable, but then increased on 12/18.  Repeat CT 12/21 with dense scattered bilateral infiltrates new from CT on 12/11.  Oxygen requirements worsened to the point that he is requiring high flow nasal cannula, then was able to decrease down to regular nasal cannula on 12/22.      Persistent severe wheezing with dependence on steroids, scheduled bronchodilators, mucolytic and flutter valve.  Respiratory viral panel negative.  Sniff test within normal limits. Abdominal distention likely impacting breathing, seems to be very slowly improving with aggressive bowel regimen.      At this time it does not appear that his COPD qualifies him for home positive pressure therapy, however he does have a history of BRENNEN which would qualify him, though his last sleep study was in 2018 so this will likely need to be repeated.    No worsening in his symptoms or oxygen requirements since decreasing prednisone    -Follow-up with sleep medicine after discharge  -Duonebs with Mucomyst QID  -Flutter valve following nebs QID  -Agree with current steroids  -Agree with antibiotics          Aidan Mcneil M.D.  Pulmonary & Critical Care  Pager: Click Here to page      I spent 38 minutes dedicated to this care so far today excluding procedures, including review of medical records, review of imaging (results & images), time with patient and time in documentation.    ==================================================      PHYSICAL EXAM  /54 (BP Location: Right arm)   Pulse 79   Temp 98.5  F (36.9  C) (Oral)   Resp 20   Ht 1.778 m (5' 10\")   Wt 81.3 kg (179 lb 3.2 oz)   SpO2 91%   BMI 25.71 kg/m          Intake/Output Summary (Last 24 hours) at 12/22/2023 1501  Last data filed at 12/22/2023 1334  Gross per 24 hour   Intake 600 ml   Output 900 ml   Net -300 ml             Vitals:    12/06/23 2200 12/18/23 0900 12/19/23 1700   Weight: 82.1 " kg (181 lb) 83.2 kg (183 lb 6.4 oz) 81.3 kg (179 lb 3.2 oz)         General: Alert, interactive, more dyspneic appearing at rest but states that he doesn't feel worse  Resp: Predominant low pitched wheezing throughout, unchanged from yesterday  Cardiac: RRR, NS1,S2, No m/r/g  Abdomen: Firm (but not rigid), though less so than yesterday, distended, tympanic to percussion with active bowel sounds  Extremities: Trace lower extremity edema  Skin: Warm and dry, no jaundice or rash  Neuro: Alert & oriented x 3, Cns 2-12 intact, moves all extremities equally      CT chest 12/21/2023  1.  Several small patchy opacities scattered within bilateral lower lobes and less so the right middle lobe, new since 12/11/2023. These likely represent pneumonia.  2.  Mild diffuse bronchial wall thickening, most likely infectious or inflammatory in etiology.  3.  Moderate to severe emphysematous changes in the lungs.    Chest x-ray 12/18  Increased consolidation in the right upper lobe since  comparison. Lungs otherwise clear.       Recent Labs   Lab Test 12/21/23  0854 12/18/23  1313 12/17/23  0839   WBC 12.5* 15.9* 13.5*   RBC 4.05* 4.43 4.44   HGB 12.3* 13.3 13.4   * 119* 115*       Recent Labs   Lab Test 12/22/23  0744 12/21/23  2108 12/21/23  1709 12/21/23  1222 12/21/23  0854 12/19/23  0747 12/18/23  1313 12/17/23  1109 12/17/23  0839 12/07/23  1652 12/06/23  1801 06/17/23  0814 06/17/23  0704 06/16/23  1217 06/16/23  0707   NA  --   --   --   --  137  --  141  --  140   < > 139   < > 138  --  140   POTASSIUM  --   --   --   --  4.0  --  4.4  --  4.3   < > 4.3   < > 4.2  --  4.2   CHLORIDE  --   --   --   --  98  --  102  --  103   < > 102   < > 103  --  106   CO2  --   --   --   --  33*  --  30*  --  29   < > 22   < > 28  --  27   BUN  --   --   --   --  11.1  --  15.3  --  19.1   < > 17.6   < > 11.6  --  14.3   CR  --   --   --   --  0.76  --  0.79  --  0.94   < > 0.87   < > 0.67  --  0.72   GLC 92 137* 148*   < > 86   < >  "159*   < > 141*   < > 213*   < > 91   < > 89   KARLIE  --   --   --   --  8.6*  --  8.4*  --  8.1*   < > 9.5   < > 8.6*  --  8.3*   MAG  --   --   --   --   --   --   --   --   --   --  2.3  --  1.9  --  2.0    < > = values in this interval not displayed.           No results for input(s): \"CULT\" in the last 168 hours.      Recent Results (from the past 48 hour(s))   CT Chest w/o Contrast    Narrative    EXAM: CT CHEST WITHOUT CONTRAST  LOCATION: [facility]  DATE/TIME: 12/21/2023 6:32 AM CST    INDICATION: History of a right upper lobe lung cancer status post radiation. Persistent hypoxic respiratory failure.  COMPARISON: 12/11/2023.    TECHNIQUE: CT chest without IV contrast. Multiplanar reformats were obtained. Dose reduction techniques were used.  CONTRAST: None.    FINDINGS:    LUNGS AND PLEURA: Moderate to severe emphysematous changes in the lungs. A band-like opacity in the upper right lung (series 6 image 66), unchanged since the prior study and likely representing the site of the right upper lobe lung cancer described in   the clinical history status post treatment. Several small patchy opacities are scattered within bilateral lower lobes and less so the right middle lobe, new since 12/11/2023. A few less than 0.6 cm diameter nodules scattered within both lungs, unchanged.   Mild diffuse bronchial wall thickening.    MEDIASTINUM/AXILLAE: Atherosclerotic calcification in the thoracic aorta.    CORONARY ARTERY CALCIFICATION: Present.    MUSCULOSKELETAL: No acute findings.    UPPER ABDOMEN: A few tiny probable gallstones in the gallbladder.      Impression    IMPRESSION:   1.  Several small patchy opacities scattered within bilateral lower lobes and less so the right middle lobe, new since 12/11/2023. These likely represent pneumonia.  2.  Mild diffuse bronchial wall thickening, most likely infectious or inflammatory in etiology.  3.  Moderate to severe emphysematous changes in the lungs. "

## 2023-12-22 NOTE — PROGRESS NOTES
Notified provider about indwelling martinez catheter discussed removal or continued need.    Did provider choose to remove indwelling martinez catheter? No    Provider's martinez indication for keeping indwelling martinez catheter: Retention.    Is there an order for indwelling martinez catheter? Yes    *If there is a plan to keep martinez catheter in place at discharge daily notification with provider is not necessary, but please add a notation in the treatment team sticky note that the patient will be discharging with the catheter.

## 2023-12-23 ENCOUNTER — APPOINTMENT (OUTPATIENT)
Dept: PHYSICAL THERAPY | Facility: CLINIC | Age: 78
DRG: 190 | End: 2023-12-23
Payer: MEDICARE

## 2023-12-23 ENCOUNTER — APPOINTMENT (OUTPATIENT)
Dept: GENERAL RADIOLOGY | Facility: CLINIC | Age: 78
DRG: 190 | End: 2023-12-23
Attending: NURSE PRACTITIONER
Payer: MEDICARE

## 2023-12-23 LAB
ANION GAP SERPL CALCULATED.3IONS-SCNC: 6 MMOL/L (ref 7–15)
BUN SERPL-MCNC: 13.8 MG/DL (ref 8–23)
CALCIUM SERPL-MCNC: 8.1 MG/DL (ref 8.8–10.2)
CHLORIDE SERPL-SCNC: 104 MMOL/L (ref 98–107)
CREAT SERPL-MCNC: 0.72 MG/DL (ref 0.67–1.17)
DEPRECATED HCO3 PLAS-SCNC: 33 MMOL/L (ref 22–29)
EGFRCR SERPLBLD CKD-EPI 2021: >90 ML/MIN/1.73M2
ERYTHROCYTE [DISTWIDTH] IN BLOOD BY AUTOMATED COUNT: 14.7 % (ref 10–15)
GLUCOSE BLDC GLUCOMTR-MCNC: 125 MG/DL (ref 70–99)
GLUCOSE BLDC GLUCOMTR-MCNC: 144 MG/DL (ref 70–99)
GLUCOSE BLDC GLUCOMTR-MCNC: 184 MG/DL (ref 70–99)
GLUCOSE BLDC GLUCOMTR-MCNC: 90 MG/DL (ref 70–99)
GLUCOSE BLDC GLUCOMTR-MCNC: 96 MG/DL (ref 70–99)
GLUCOSE SERPL-MCNC: 94 MG/DL (ref 70–99)
HCT VFR BLD AUTO: 35.6 % (ref 40–53)
HGB BLD-MCNC: 11.1 G/DL (ref 13.3–17.7)
MCH RBC QN AUTO: 30.1 PG (ref 26.5–33)
MCHC RBC AUTO-ENTMCNC: 31.2 G/DL (ref 31.5–36.5)
MCV RBC AUTO: 97 FL (ref 78–100)
PLATELET # BLD AUTO: 96 10E3/UL (ref 150–450)
POTASSIUM SERPL-SCNC: 3.4 MMOL/L (ref 3.4–5.3)
PROCALCITONIN SERPL IA-MCNC: 1.32 NG/ML
RBC # BLD AUTO: 3.69 10E6/UL (ref 4.4–5.9)
SODIUM SERPL-SCNC: 143 MMOL/L (ref 135–145)
WBC # BLD AUTO: 7.8 10E3/UL (ref 4–11)

## 2023-12-23 PROCEDURE — 80048 BASIC METABOLIC PNL TOTAL CA: CPT | Performed by: HOSPITALIST

## 2023-12-23 PROCEDURE — 250N000009 HC RX 250: Performed by: STUDENT IN AN ORGANIZED HEALTH CARE EDUCATION/TRAINING PROGRAM

## 2023-12-23 PROCEDURE — 250N000012 HC RX MED GY IP 250 OP 636 PS 637: Performed by: HOSPITALIST

## 2023-12-23 PROCEDURE — 250N000013 HC RX MED GY IP 250 OP 250 PS 637: Performed by: INTERNAL MEDICINE

## 2023-12-23 PROCEDURE — 71045 X-RAY EXAM CHEST 1 VIEW: CPT

## 2023-12-23 PROCEDURE — 85027 COMPLETE CBC AUTOMATED: CPT | Performed by: HOSPITALIST

## 2023-12-23 PROCEDURE — 120N000001 HC R&B MED SURG/OB

## 2023-12-23 PROCEDURE — 99232 SBSQ HOSP IP/OBS MODERATE 35: CPT | Performed by: HOSPITALIST

## 2023-12-23 PROCEDURE — 94640 AIRWAY INHALATION TREATMENT: CPT

## 2023-12-23 PROCEDURE — 250N000013 HC RX MED GY IP 250 OP 250 PS 637: Performed by: HOSPITALIST

## 2023-12-23 PROCEDURE — 94660 CPAP INITIATION&MGMT: CPT

## 2023-12-23 PROCEDURE — 250N000013 HC RX MED GY IP 250 OP 250 PS 637: Performed by: STUDENT IN AN ORGANIZED HEALTH CARE EDUCATION/TRAINING PROGRAM

## 2023-12-23 PROCEDURE — 250N000011 HC RX IP 250 OP 636: Mod: JZ | Performed by: NURSE PRACTITIONER

## 2023-12-23 PROCEDURE — 94640 AIRWAY INHALATION TREATMENT: CPT | Mod: 76

## 2023-12-23 PROCEDURE — 99291 CRITICAL CARE FIRST HOUR: CPT | Performed by: NURSE PRACTITIONER

## 2023-12-23 PROCEDURE — 250N000011 HC RX IP 250 OP 636: Mod: JZ | Performed by: HOSPITALIST

## 2023-12-23 PROCEDURE — 97530 THERAPEUTIC ACTIVITIES: CPT | Mod: GP

## 2023-12-23 PROCEDURE — 999N000157 HC STATISTIC RCP TIME EA 10 MIN

## 2023-12-23 PROCEDURE — 250N000009 HC RX 250: Performed by: HOSPITALIST

## 2023-12-23 PROCEDURE — 36415 COLL VENOUS BLD VENIPUNCTURE: CPT | Performed by: HOSPITALIST

## 2023-12-23 PROCEDURE — 97116 GAIT TRAINING THERAPY: CPT | Mod: GP

## 2023-12-23 PROCEDURE — 84145 PROCALCITONIN (PCT): CPT | Performed by: HOSPITALIST

## 2023-12-23 RX ORDER — FUROSEMIDE 10 MG/ML
20 INJECTION INTRAMUSCULAR; INTRAVENOUS ONCE
Status: COMPLETED | OUTPATIENT
Start: 2023-12-23 | End: 2023-12-23

## 2023-12-23 RX ORDER — FUROSEMIDE 10 MG/ML
INJECTION INTRAMUSCULAR; INTRAVENOUS
Status: COMPLETED
Start: 2023-12-23 | End: 2023-12-23

## 2023-12-23 RX ADMIN — PIPERACILLIN AND TAZOBACTAM 4.5 G: 4; .5 INJECTION, POWDER, FOR SOLUTION INTRAVENOUS at 00:15

## 2023-12-23 RX ADMIN — PREDNISONE 40 MG: 20 TABLET ORAL at 09:14

## 2023-12-23 RX ADMIN — IPRATROPIUM BROMIDE AND ALBUTEROL SULFATE 3 ML: .5; 3 SOLUTION RESPIRATORY (INHALATION) at 15:57

## 2023-12-23 RX ADMIN — ASPIRIN 81 MG: 81 TABLET, COATED ORAL at 21:24

## 2023-12-23 RX ADMIN — FUROSEMIDE 20 MG: 20 TABLET ORAL at 09:15

## 2023-12-23 RX ADMIN — DULOXETINE HYDROCHLORIDE 20 MG: 20 CAPSULE, DELAYED RELEASE ORAL at 21:24

## 2023-12-23 RX ADMIN — IPRATROPIUM BROMIDE AND ALBUTEROL SULFATE 3 ML: .5; 3 SOLUTION RESPIRATORY (INHALATION) at 13:50

## 2023-12-23 RX ADMIN — ACETAMINOPHEN 1000 MG: 500 TABLET, FILM COATED ORAL at 06:38

## 2023-12-23 RX ADMIN — GUAIFENESIN 600 MG: 600 TABLET, EXTENDED RELEASE ORAL at 13:16

## 2023-12-23 RX ADMIN — TAMSULOSIN HYDROCHLORIDE 0.4 MG: 0.4 CAPSULE ORAL at 09:15

## 2023-12-23 RX ADMIN — FUROSEMIDE 20 MG: 10 INJECTION, SOLUTION INTRAMUSCULAR; INTRAVENOUS at 14:42

## 2023-12-23 RX ADMIN — DULOXETINE HYDROCHLORIDE 20 MG: 20 CAPSULE, DELAYED RELEASE ORAL at 09:14

## 2023-12-23 RX ADMIN — ENOXAPARIN SODIUM 40 MG: 40 INJECTION SUBCUTANEOUS at 11:24

## 2023-12-23 RX ADMIN — ASPIRIN 81 MG: 81 TABLET, COATED ORAL at 09:14

## 2023-12-23 RX ADMIN — IPRATROPIUM BROMIDE AND ALBUTEROL SULFATE 3 ML: .5; 3 SOLUTION RESPIRATORY (INHALATION) at 10:58

## 2023-12-23 RX ADMIN — DOCUSATE SODIUM 50 MG AND SENNOSIDES 8.6 MG 2 TABLET: 8.6; 5 TABLET, FILM COATED ORAL at 09:14

## 2023-12-23 RX ADMIN — INSULIN ASPART 1 UNITS: 100 INJECTION, SOLUTION INTRAVENOUS; SUBCUTANEOUS at 13:12

## 2023-12-23 RX ADMIN — PIPERACILLIN AND TAZOBACTAM 4.5 G: 4; .5 INJECTION, POWDER, FOR SOLUTION INTRAVENOUS at 23:17

## 2023-12-23 RX ADMIN — LEVOTHYROXINE SODIUM 125 MCG: 125 TABLET ORAL at 09:14

## 2023-12-23 RX ADMIN — GABAPENTIN 400 MG: 100 CAPSULE ORAL at 21:24

## 2023-12-23 RX ADMIN — IPRATROPIUM BROMIDE AND ALBUTEROL SULFATE 3 ML: .5; 3 SOLUTION RESPIRATORY (INHALATION) at 19:25

## 2023-12-23 RX ADMIN — PIPERACILLIN AND TAZOBACTAM 4.5 G: 4; .5 INJECTION, POWDER, FOR SOLUTION INTRAVENOUS at 18:52

## 2023-12-23 RX ADMIN — TRAZODONE HYDROCHLORIDE 50 MG: 50 TABLET ORAL at 23:13

## 2023-12-23 RX ADMIN — IPRATROPIUM BROMIDE AND ALBUTEROL SULFATE 3 ML: .5; 3 SOLUTION RESPIRATORY (INHALATION) at 07:37

## 2023-12-23 RX ADMIN — AMLODIPINE BESYLATE 5 MG: 5 TABLET ORAL at 21:25

## 2023-12-23 RX ADMIN — OXYCODONE HYDROCHLORIDE 5 MG: 5 TABLET ORAL at 23:13

## 2023-12-23 RX ADMIN — ACETAMINOPHEN 1000 MG: 500 TABLET, FILM COATED ORAL at 13:16

## 2023-12-23 RX ADMIN — PIPERACILLIN AND TAZOBACTAM 4.5 G: 4; .5 INJECTION, POWDER, FOR SOLUTION INTRAVENOUS at 13:05

## 2023-12-23 RX ADMIN — ROSUVASTATIN CALCIUM 10 MG: 10 TABLET, FILM COATED ORAL at 21:24

## 2023-12-23 RX ADMIN — ACETAMINOPHEN 1000 MG: 500 TABLET, FILM COATED ORAL at 21:25

## 2023-12-23 RX ADMIN — GABAPENTIN 400 MG: 100 CAPSULE ORAL at 16:47

## 2023-12-23 RX ADMIN — PIPERACILLIN AND TAZOBACTAM 4.5 G: 4; .5 INJECTION, POWDER, FOR SOLUTION INTRAVENOUS at 06:35

## 2023-12-23 RX ADMIN — GABAPENTIN 400 MG: 100 CAPSULE ORAL at 09:14

## 2023-12-23 RX ADMIN — INSULIN ASPART 1 UNITS: 100 INJECTION, SOLUTION INTRAVENOUS; SUBCUTANEOUS at 18:52

## 2023-12-23 RX ADMIN — METOPROLOL SUCCINATE 50 MG: 50 TABLET, EXTENDED RELEASE ORAL at 21:24

## 2023-12-23 RX ADMIN — CYCLOBENZAPRINE 10 MG: 10 TABLET, FILM COATED ORAL at 23:13

## 2023-12-23 RX ADMIN — PANTOPRAZOLE SODIUM 40 MG: 40 TABLET, DELAYED RELEASE ORAL at 06:38

## 2023-12-23 RX ADMIN — LIDOCAINE 2 PATCH: 4 PATCH TOPICAL at 09:15

## 2023-12-23 ASSESSMENT — ACTIVITIES OF DAILY LIVING (ADL)
ADLS_ACUITY_SCORE: 38
ADLS_ACUITY_SCORE: 37
ADLS_ACUITY_SCORE: 38
ADLS_ACUITY_SCORE: 37

## 2023-12-23 NOTE — PROGRESS NOTES
Summary: Shortness of breath due to COPD exacerbation  DATE & TIME: 12/22/23;[id number]  Cognitive Concerns/ Orientation: AOx4; pleasant  BEHAVIOR & AGGRESSION TOOL COLOR: Green  ABNL VS/O2: VSS on 3L via NC; Desats to mid 80s when transferring.   MOBILITY: A1 with GB/walker, up in chair for meals, can reposition himself in bed. leans to the right often  PAIN MANAGMENT: c/o back pain; declined pain meds.  DIET: Regular  BOWEL/BLADDER: Mir placed for retention. Strict I&Os; Trial void today  ABNORMAL LABS: WBC 12.5, CO2 58, Bicarb 37. Procal 3.04  , 199  DRAIN/DEVICES: PIV SL; intermittent abx.   TELEMETRY RHYTHM: NA  SKIN: Scattered bruising and scabs  TESTS/PROCEDURES: None   D/C DAY/GOALS/PLACE: Supplemental O2 3 LPM ,ACEVES  OTHER IMPORTANT INFO:

## 2023-12-23 NOTE — PROGRESS NOTES
Placed patient back to Bipap, Lung sounds sounded coarse after a little more than 1 hour off Bipap.

## 2023-12-23 NOTE — PROGRESS NOTES
[facility]    Medicine Progress Note - Hospitalist Service    Date of Admission:  12/6/2023    Assessment & Plan   Harrison Thomas is a 78 year old male admitted on 12/6/2023. past medical history significant for chronic hypoxic respiratory failure [has oxygen as needed at home], COPD, RUL lung mass s/p radiation, untreated BRENNEN, HTN, HLD, CAD, hypothyroidism admitted on 12/6/2023 with acute on chronic hypoxic respiratory failure likely 2/2 COPD exacerbation.      COPD exacerbation  Acute on chronic hypoxemic respiratory failure   Lung mass s/p radiation treatment and probable radiation pneumonitis   BRENNEN   *Follows with Dr. Handy with pulmonology at [facility], last seen in September 2023.  On low dose chronic prednisone (2mg).  *Patient presented to the ED with increased shortness of breath over the past week, increased oxygen use at home.  *Admission clinical picture consistent with COPD exacerbation - poor air movement with diffuse expiratory wheezing - placed on steroids and nebs.  *BNP and procalcitonin wnl, COVID/flu/RSV negative on admission, resp viral panel negative (12/15).  *Admission CXR showed just faint residual opacities in the RUL corresponding to RUL mass and post-treatment changes.  *CT pulm angio 12/11 negative for PE, shows mild bronchial wall thickening and extensive emphysema.  *Underwent sniff test this admission that was negative.  *Received 5-day treatment course azithromycin at admission, resumed  mg every other day dosing for prophylaxis.  *Significantly increased oxygen needs 12/20 necessitating HFNC and Bipap.  *Pulm consulted and following.  *Transitioned from IV solumedrol to prednisone 12/20.  - Continue PTA azithromycin 500 mg every other day.  - Continue prednisone 40 mg daily.  - Duonebs qid.  - Home inhaler on hold.  - Continue PTA Lasix 20 mg daily.  - Encourage IS and flutter valve.  - Has been weaned down from HFNC to 2 lpm by nasal cannula.  -  Repeat procal 12/21 up to 3.04 (from 0.06 on 12/6) and CT chest 12/21 showing new small patchy opacities of bilateral lower lobes and RML, mild bronchial thickening.  - Started zosyn on 12/21/23 for hospital acquired pneumonia.  - MRSA nasal swab negative on 12/21/23.  - Respiratory culture obtained 12/22/23, growing 1+ yeast.  - Procalcitonin improved to 1.32 on 12/23/23.  - Ambulatory oximetry prior to discharge to qualify for home oxygen.  - Follow up with sleep medicine at discharge for review of BRENNEN and qualification of CPAP.  - Pulm recs much appreciated.     Hypertension   Hyperlipidemia  Coronary artery disease   - Continue PTA metoprolol, amlodipine, aspirin, rosuvastatin and lasix.     Chronic low back pain   *Patient is supposed to be getting a lumbar MRI soon.  Can be done as an outpatient.  - Continue PTA oxycodone.  - Gabapentin changed from 600 mg bid to 400 mg tid, continue.  - Hold naproxen for now in the setting of high-dose steroids since he would be at high risk for upper GI bleed and ulcers.  - Oral PPI for prophylaxis.  - Flexeril 10 mg 3 times daily PRN.  - Scheduled Tylenol tid.  - Lidocaine patches.    Constipation  *Given concern for abdominal distention, obtained abdominal x-ray on 12/18 that still showed large amount of stool throughout the colon.  Started having bowel movement on 12/19/23 and has been having at least daily bowel movements since then.  - Continue on aggressive bowel regimen.    Urinary retention  *Required straight cath x3 12/20-12/21 and martinez placed.  - Start flomax 12/21/23.  - Trial of voiding today, monitor for retention following catheter removal.  - Aggressively treat constipation as above.     Hx of papillary thyroid cancer s/p thyroidectomy   Hypothyroidism   - Continue PTA levothyroxine.     Insomnia   - Continue PTA Trazodone.     Leukocytosis  - Related to steroids and pneumonia.  - WBC back to within normal limits on 12/23/23.     Thrombocytopenia  Platelets  "in low 100s.  Not on any heparin products on admission through 12/20.  He is on baby aspirin twice daily, no signs of bleeding.  - Platelets 134 on 12/16/23, slowly trending down.  - Platelets 96 on 12/23/23.  - Lovenox prophylaxis started 12/21/23, will continue for now, consider stopping if platelets trend down further.     Bilateral pulmonary nodules  CT chest 12/11 showing scattered small bilateral pulmonary nodules measuring up to 5 mm are unchanged  - Continued surveillance recommended.           Diet: Moderate Consistent Carb (60 g CHO per Meal) Diet    DVT Prophylaxis: Enoxaparin (Lovenox) SQ  Mir Catheter: PRESENT, indication: Retention  Lines: None     Cardiac Monitoring: None  Code Status: No CPR- Do NOT Intubate      Clinically Significant Risk Factors              # Hypoalbuminemia: Lowest albumin = 3.4 g/dL at 12/8/2023 11:58 AM, will monitor as appropriate   # Thrombocytopenia: Lowest platelets = 96 in last 2 days, will monitor for bleeding   # Hypertension: Noted on problem list        # Overweight: Estimated body mass index is 25.71 kg/m  as calculated from the following:    Height as of this encounter: 1.778 m (5' 10\").    Weight as of this encounter: 81.3 kg (179 lb 3.2 oz).             Disposition Plan     Expected Discharge Date: 12/24/2023        Discharge Comments: pt is on iv solumedrol            Tino Zabala MD  Hospitalist Service  [facility]  Securely message with [employer] (more info)  Text page via [employer] Paging/Directory   ______________________________________________________________________    Interval History   Harrison Thomas was seen this morning. He feels about the same as yesterday. Shortness of breath improved overall but not resolved. Denies fevers, chest pain, nausea, abdominal pain.    Physical Exam   Vital Signs: Temp: 98.1  F (36.7  C) Temp src: Oral BP: (!) 140/70 Pulse: 78   Resp: 16 SpO2: 95 % O2 Device: Nasal cannula Oxygen Delivery: 3 " LPM  Weight: 179 lbs 3.2 oz    Constitutional: awake, alert, cooperative, no apparent distress, laying in the hospital bed with the head of the bed elevated  Respiratory: appears short of breath, coarse lung sounds audible from across the room, few wheezes  Cardiovascular: regular rate and rhythm, normal S1 and S2, no murmur noted  GI: normal bowel sounds, soft, non-distended, non-tender  Skin: warm, dry  Musculoskeletal: no lower extremity pitting edema present  Neurologic: awake, alert, answers questions appropriately, moves all extremities    Medical Decision Making       40 MINUTES SPENT BY ME on the date of service doing chart review, history, exam, documentation & further activities per the note.      Data     I have personally reviewed the following data over the past 24 hrs:    7.8  \   11.1 (L)   / 96 (L)     143 104 13.8 /  144 (H)   3.4 33 (H) 0.72 \     Procal: 1.32 (H) CRP: N/A Lactic Acid: N/A

## 2023-12-23 NOTE — PROGRESS NOTES
Responded to Rapid Response call.   RRT was called due to Respiratory distress.   Patient was on 3L NC, RR 26/min, SpO2 90%.   Respiratory interventions included NT suction and Bipap 12/5 50%.   Patient Outcome: Pt breathing more comfortably. Will reassess in 1 hour.      Maribell Herzog, RT,  12/23/2023 3:02 PM

## 2023-12-23 NOTE — PLAN OF CARE
Summary: Shortness of breath due to COPD exacerbation  DATE & TIME: 12/22/23;5621-9093  Cognitive Concerns/ Orientation: AOx4; pleasant  BEHAVIOR & AGGRESSION TOOL COLOR: Green  ABNL VS/O2: VSS on 3L via NC; Desats to mid 80s when transferring.  Recovers in less than a minute.   MOBILITY: A1 with GB/walker, up in chair for meals, can reposition himself in bed. leans to the right often  PAIN MANAGMENT: c/o back pain; scheduled tylenol given and PRN robaxin and PRN oxy given x1  DIET: Regular  BOWEL/BLADDER: Mir placed for retention. Strict I&Os; no BM this shift  ABNORMAL LABS: WBC 12.5, CO2 58, Bicarb 37. Procal 3.04  , 199  DRAIN/DEVICES: PIV SL; intermittent abx.   TELEMETRY RHYTHM: NA  SKIN: Scattered bruising and scabs  TESTS/PROCEDURES: None   D/C DAY/GOALS/PLACE: Patient remains on 3 liters.  Respirations up with any movement/short of breath/ACEVES  OTHER IMPORTANT INFO: CT shows new opacities. Zosyn started. MRSA/MSSA PCR collected, on scheduled nebs. Pulmonology following.

## 2023-12-23 NOTE — CODE/RAPID RESPONSE
"[facility]    RRT Note  12/23/2023   Time Called: 1430    RRT called for dyspnea, hypoxia    Code Status: No CPR- Do NOT Intubate    Assessment & Plan   I was called to evaluate Harrison Thomas, who is a 78 year old male with a past medical history significant for chronic hypoxic respiratory failure [has oxygen as needed at home], COPD, RUL lung mass s/p radiation, untreated BRENNEN, HTN, HLD, CAD, hypothyroidism admitted on 12/6/2023 with acute on chronic hypoxic respiratory failure likely 2/2 COPD exacerbation.      I was paged to evaluate the patient for an acute episode of dyspnea and hypoxia which started about 15 minutes prior to RRT being called. Patient had been resting comfortably on 2-3L NC then suddenly began to desat and have increased oxygen requirements.    Upon arrival, patient was on 4L NC, RR 22/min, SpO2 88-90%, with accessory muscle usage. Patient was alert and oriented, endorsed dyspnea. Patient's other vital signs stable at this time; /70, HR 70s, afebrile. Lungs wet throughout with bilateral lower extremity edema. Abdomen soft, non-distended and non-tender.     BP (!) 140/70 (BP Location: Right arm)   Pulse 78   Temp 98.1  F (36.7  C) (Oral)   Resp 16   Ht 1.778 m (5' 10\")   Wt 81.3 kg (179 lb 3.2 oz)   SpO2 90%   BMI 25.71 kg/m      Diagnosis:   COPD exacerbation  Acute on chronic hypoxemic respiratory failure     Plan:   - 20mg IV Lasix   - NT suction   - Bipap 12/5 50%  - CXR  - ECHO    At the conclusion of this RRT patient was hemodynamically stable and will remain on current unit    Discussed with and defer further cares to bedside nursing staff and attending hospitalist, Dr. Zabala.     PREETI Alves CNP  [facility]  Securely message with the Vocera Web Console (learn more here)  Text page via [employer] Paging/Directory        Physical Exam   Vital Signs with Ranges:  Temp:  [98.1  F (36.7  C)] 98.1  F (36.7  C)  Pulse:  " [] 78  Resp:  [16-23] 16  BP: (125-140)/(70-79) 140/70  SpO2:  [90 %-96 %] 90 %  I/O last 3 completed shifts:  In: 540 [P.O.:540]  Out: 1550 [Urine:1550]    Physical Exam  Cardiovascular:      Rate and Rhythm: Normal rate and regular rhythm.      Heart sounds: No murmur heard.     No friction rub. No gallop.   Pulmonary:      Effort: Tachypnea and accessory muscle usage present.   Abdominal:      Palpations: Abdomen is soft.      Tenderness: There is no abdominal tenderness.   Musculoskeletal:      Right lower le+ Pitting Edema present.      Left lower le+ Pitting Edema present.   Skin:     General: Skin is warm and dry.      Capillary Refill: Capillary refill takes less than 2 seconds.   Neurological:      Mental Status: He is alert.   Psychiatric:         Behavior: Behavior is cooperative.           Data   IMAGING: (X-ray/CT/MRI)   No results found for this or any previous visit (from the past 24 hour(s)).    CBC with Diff:  Recent Labs   Lab Test 23  0859 06/15/23  0742 23  0654   WBC 7.8   < >  --    HGB 11.1*   < > 13.5   MCV 97   < >  --    PLT 96*   < >  --    INR  --   --  1.09    < > = values in this interval not displayed.          Comprehensive Metabolic Panel:  Recent Labs   Lab 23  1246 23  0859   NA  --  143   POTASSIUM  --  3.4   CHLORIDE  --  104   CO2  --  33*   ANIONGAP  --  6*   * 94   BUN  --  13.8   CR  --  0.72   GFRESTIMATED  --  >90   KARLIE  --  8.1*         Time Spent on this Encounter     Medical Decision Making                CRITICAL CARE TIME  I spent 45 minutes of critical care time on the unit/floor managing the care of Harrison Thomas. Upon evaluation, this patient had a high probability of imminent or life-threatening deterioration due to acute COPD exacerbation which required my direct attention, intervention, and personal management. 100% of my time was spent at the bedside counseling the patient and/or coordinating care regarding  services listed in this note.

## 2023-12-24 ENCOUNTER — APPOINTMENT (OUTPATIENT)
Dept: PHYSICAL THERAPY | Facility: CLINIC | Age: 78
DRG: 190 | End: 2023-12-24
Payer: MEDICARE

## 2023-12-24 ENCOUNTER — APPOINTMENT (OUTPATIENT)
Dept: CARDIOLOGY | Facility: CLINIC | Age: 78
DRG: 190 | End: 2023-12-24
Attending: HOSPITALIST
Payer: MEDICARE

## 2023-12-24 LAB
ANION GAP SERPL CALCULATED.3IONS-SCNC: 10 MMOL/L (ref 7–15)
BACTERIA SPT CULT: ABNORMAL
BUN SERPL-MCNC: 12.3 MG/DL (ref 8–23)
CALCIUM SERPL-MCNC: 8.8 MG/DL (ref 8.8–10.2)
CHLORIDE SERPL-SCNC: 101 MMOL/L (ref 98–107)
CREAT SERPL-MCNC: 0.75 MG/DL (ref 0.67–1.17)
DEPRECATED HCO3 PLAS-SCNC: 31 MMOL/L (ref 22–29)
EGFRCR SERPLBLD CKD-EPI 2021: >90 ML/MIN/1.73M2
ERYTHROCYTE [DISTWIDTH] IN BLOOD BY AUTOMATED COUNT: 14.5 % (ref 10–15)
GLUCOSE BLDC GLUCOMTR-MCNC: 104 MG/DL (ref 70–99)
GLUCOSE BLDC GLUCOMTR-MCNC: 70 MG/DL (ref 70–99)
GLUCOSE BLDC GLUCOMTR-MCNC: 99 MG/DL (ref 70–99)
GLUCOSE SERPL-MCNC: 96 MG/DL (ref 70–99)
GRAM STAIN RESULT: ABNORMAL
GRAM STAIN RESULT: ABNORMAL
HCT VFR BLD AUTO: 36.6 % (ref 40–53)
HGB BLD-MCNC: 11.6 G/DL (ref 13.3–17.7)
LVEF ECHO: NORMAL
MAGNESIUM SERPL-MCNC: 2 MG/DL (ref 1.7–2.3)
MCH RBC QN AUTO: 30.3 PG (ref 26.5–33)
MCHC RBC AUTO-ENTMCNC: 31.7 G/DL (ref 31.5–36.5)
MCV RBC AUTO: 96 FL (ref 78–100)
PLATELET # BLD AUTO: 106 10E3/UL (ref 150–450)
POTASSIUM SERPL-SCNC: 3.6 MMOL/L (ref 3.4–5.3)
RBC # BLD AUTO: 3.83 10E6/UL (ref 4.4–5.9)
SODIUM SERPL-SCNC: 142 MMOL/L (ref 135–145)
WBC # BLD AUTO: 5.5 10E3/UL (ref 4–11)

## 2023-12-24 PROCEDURE — 85027 COMPLETE CBC AUTOMATED: CPT | Performed by: HOSPITALIST

## 2023-12-24 PROCEDURE — 120N000001 HC R&B MED SURG/OB

## 2023-12-24 PROCEDURE — 250N000013 HC RX MED GY IP 250 OP 250 PS 637: Performed by: INTERNAL MEDICINE

## 2023-12-24 PROCEDURE — 999N000208 ECHOCARDIOGRAM COMPLETE

## 2023-12-24 PROCEDURE — 80048 BASIC METABOLIC PNL TOTAL CA: CPT | Performed by: HOSPITALIST

## 2023-12-24 PROCEDURE — 97530 THERAPEUTIC ACTIVITIES: CPT | Mod: GP

## 2023-12-24 PROCEDURE — 250N000012 HC RX MED GY IP 250 OP 636 PS 637: Performed by: HOSPITALIST

## 2023-12-24 PROCEDURE — 250N000013 HC RX MED GY IP 250 OP 250 PS 637: Performed by: HOSPITALIST

## 2023-12-24 PROCEDURE — 94640 AIRWAY INHALATION TREATMENT: CPT

## 2023-12-24 PROCEDURE — 999N000157 HC STATISTIC RCP TIME EA 10 MIN

## 2023-12-24 PROCEDURE — 94640 AIRWAY INHALATION TREATMENT: CPT | Mod: 76

## 2023-12-24 PROCEDURE — 97116 GAIT TRAINING THERAPY: CPT | Mod: GP

## 2023-12-24 PROCEDURE — 250N000011 HC RX IP 250 OP 636: Mod: JZ | Performed by: HOSPITALIST

## 2023-12-24 PROCEDURE — 250N000013 HC RX MED GY IP 250 OP 250 PS 637: Performed by: STUDENT IN AN ORGANIZED HEALTH CARE EDUCATION/TRAINING PROGRAM

## 2023-12-24 PROCEDURE — 36415 COLL VENOUS BLD VENIPUNCTURE: CPT | Performed by: HOSPITALIST

## 2023-12-24 PROCEDURE — 250N000009 HC RX 250: Performed by: STUDENT IN AN ORGANIZED HEALTH CARE EDUCATION/TRAINING PROGRAM

## 2023-12-24 PROCEDURE — 250N000011 HC RX IP 250 OP 636: Performed by: HOSPITALIST

## 2023-12-24 PROCEDURE — C8929 TTE W OR WO FOL WCON,DOPPLER: HCPCS

## 2023-12-24 PROCEDURE — 255N000002 HC RX 255 OP 636: Performed by: STUDENT IN AN ORGANIZED HEALTH CARE EDUCATION/TRAINING PROGRAM

## 2023-12-24 PROCEDURE — 83735 ASSAY OF MAGNESIUM: CPT | Performed by: HOSPITALIST

## 2023-12-24 PROCEDURE — 99233 SBSQ HOSP IP/OBS HIGH 50: CPT | Performed by: HOSPITALIST

## 2023-12-24 PROCEDURE — 93306 TTE W/DOPPLER COMPLETE: CPT | Mod: 26 | Performed by: INTERNAL MEDICINE

## 2023-12-24 RX ORDER — FUROSEMIDE 10 MG/ML
20 INJECTION INTRAMUSCULAR; INTRAVENOUS ONCE
Status: COMPLETED | OUTPATIENT
Start: 2023-12-24 | End: 2023-12-24

## 2023-12-24 RX ORDER — FUROSEMIDE 40 MG
40 TABLET ORAL DAILY
Status: DISCONTINUED | OUTPATIENT
Start: 2023-12-25 | End: 2024-01-10 | Stop reason: HOSPADM

## 2023-12-24 RX ADMIN — ACETAMINOPHEN 1000 MG: 500 TABLET, FILM COATED ORAL at 06:21

## 2023-12-24 RX ADMIN — OXYCODONE HYDROCHLORIDE 5 MG: 5 TABLET ORAL at 14:25

## 2023-12-24 RX ADMIN — ENOXAPARIN SODIUM 40 MG: 40 INJECTION SUBCUTANEOUS at 09:47

## 2023-12-24 RX ADMIN — GABAPENTIN 400 MG: 100 CAPSULE ORAL at 16:10

## 2023-12-24 RX ADMIN — IPRATROPIUM BROMIDE AND ALBUTEROL SULFATE 3 ML: .5; 3 SOLUTION RESPIRATORY (INHALATION) at 16:12

## 2023-12-24 RX ADMIN — IPRATROPIUM BROMIDE AND ALBUTEROL SULFATE 3 ML: .5; 3 SOLUTION RESPIRATORY (INHALATION) at 11:06

## 2023-12-24 RX ADMIN — TRAZODONE HYDROCHLORIDE 50 MG: 50 TABLET ORAL at 21:23

## 2023-12-24 RX ADMIN — ASPIRIN 81 MG: 81 TABLET, COATED ORAL at 21:23

## 2023-12-24 RX ADMIN — ACETAMINOPHEN 1000 MG: 500 TABLET, FILM COATED ORAL at 21:23

## 2023-12-24 RX ADMIN — DOCUSATE SODIUM 50 MG AND SENNOSIDES 8.6 MG 2 TABLET: 8.6; 5 TABLET, FILM COATED ORAL at 09:48

## 2023-12-24 RX ADMIN — DULOXETINE HYDROCHLORIDE 20 MG: 20 CAPSULE, DELAYED RELEASE ORAL at 09:48

## 2023-12-24 RX ADMIN — ROSUVASTATIN CALCIUM 10 MG: 10 TABLET, FILM COATED ORAL at 21:22

## 2023-12-24 RX ADMIN — PIPERACILLIN AND TAZOBACTAM 4.5 G: 4; .5 INJECTION, POWDER, FOR SOLUTION INTRAVENOUS at 13:59

## 2023-12-24 RX ADMIN — ASPIRIN 81 MG: 81 TABLET, COATED ORAL at 09:48

## 2023-12-24 RX ADMIN — IPRATROPIUM BROMIDE AND ALBUTEROL SULFATE 3 ML: .5; 3 SOLUTION RESPIRATORY (INHALATION) at 20:21

## 2023-12-24 RX ADMIN — DULOXETINE HYDROCHLORIDE 20 MG: 20 CAPSULE, DELAYED RELEASE ORAL at 21:22

## 2023-12-24 RX ADMIN — GUAIFENESIN 600 MG: 600 TABLET, EXTENDED RELEASE ORAL at 14:31

## 2023-12-24 RX ADMIN — GABAPENTIN 400 MG: 100 CAPSULE ORAL at 09:47

## 2023-12-24 RX ADMIN — POLYETHYLENE GLYCOL 3350 17 G: 17 POWDER, FOR SOLUTION ORAL at 21:22

## 2023-12-24 RX ADMIN — CYCLOBENZAPRINE 10 MG: 10 TABLET, FILM COATED ORAL at 14:26

## 2023-12-24 RX ADMIN — FUROSEMIDE 20 MG: 10 INJECTION, SOLUTION INTRAMUSCULAR; INTRAVENOUS at 13:36

## 2023-12-24 RX ADMIN — METOPROLOL SUCCINATE 50 MG: 50 TABLET, EXTENDED RELEASE ORAL at 21:22

## 2023-12-24 RX ADMIN — TAMSULOSIN HYDROCHLORIDE 0.4 MG: 0.4 CAPSULE ORAL at 09:48

## 2023-12-24 RX ADMIN — AMLODIPINE BESYLATE 5 MG: 5 TABLET ORAL at 21:23

## 2023-12-24 RX ADMIN — HUMAN ALBUMIN MICROSPHERES AND PERFLUTREN 6 ML: 10; .22 INJECTION, SOLUTION INTRAVENOUS at 09:30

## 2023-12-24 RX ADMIN — DOCUSATE SODIUM 50 MG AND SENNOSIDES 8.6 MG 2 TABLET: 8.6; 5 TABLET, FILM COATED ORAL at 21:22

## 2023-12-24 RX ADMIN — ACETAMINOPHEN 1000 MG: 500 TABLET, FILM COATED ORAL at 14:05

## 2023-12-24 RX ADMIN — IPRATROPIUM BROMIDE AND ALBUTEROL SULFATE 3 ML: .5; 3 SOLUTION RESPIRATORY (INHALATION) at 07:24

## 2023-12-24 RX ADMIN — PREDNISONE 40 MG: 20 TABLET ORAL at 09:48

## 2023-12-24 RX ADMIN — LIDOCAINE 2 PATCH: 4 PATCH TOPICAL at 09:48

## 2023-12-24 RX ADMIN — AZITHROMYCIN DIHYDRATE 500 MG: 250 TABLET ORAL at 09:48

## 2023-12-24 RX ADMIN — PANTOPRAZOLE SODIUM 40 MG: 40 TABLET, DELAYED RELEASE ORAL at 06:21

## 2023-12-24 RX ADMIN — LEVOTHYROXINE SODIUM 125 MCG: 125 TABLET ORAL at 09:48

## 2023-12-24 RX ADMIN — FUROSEMIDE 20 MG: 20 TABLET ORAL at 09:53

## 2023-12-24 RX ADMIN — GABAPENTIN 400 MG: 100 CAPSULE ORAL at 21:22

## 2023-12-24 RX ADMIN — PIPERACILLIN AND TAZOBACTAM 4.5 G: 4; .5 INJECTION, POWDER, FOR SOLUTION INTRAVENOUS at 06:21

## 2023-12-24 RX ADMIN — PIPERACILLIN AND TAZOBACTAM 4.5 G: 4; .5 INJECTION, POWDER, FOR SOLUTION INTRAVENOUS at 18:13

## 2023-12-24 ASSESSMENT — ACTIVITIES OF DAILY LIVING (ADL)
ADLS_ACUITY_SCORE: 38
ADLS_ACUITY_SCORE: 41
ADLS_ACUITY_SCORE: 38
ADLS_ACUITY_SCORE: 41
ADLS_ACUITY_SCORE: 38
ADLS_ACUITY_SCORE: 38
ADLS_ACUITY_SCORE: 41

## 2023-12-24 NOTE — PLAN OF CARE
Goal Outcome Evaluation:       Summary: Shortness of breath due to COPD exacerbation  DATE & TIME:12/23/23-12/24/23 7523-6993  Cognitive Concerns/ Orientation: AOx4; pleasant  BEHAVIOR & AGGRESSION TOOL COLOR: Green  ABNL VS/O2: BP elevated, other VSS. Patient did not require BIPAP use tonight, Oxygen SAT's in upper 90's on 3 L NC. Gets ACEVES, with abdominal muscles used at times.   MOBILITY: A1 with GB/walker. Able to position self in bed  PAIN MANAGMENT: c/o back pain; gave PRN Oxycodone x1, flexeril x1  Diet: Mod Carb diet  BOWEL/BLADDER: Mri removed on 12/23 day shift. Bladder scan @ 1949 12/23 was 656 ml, voided 370 ml and PVR was 284 ml. Total UOP is 820 ml. No BM this shift  ABNORMAL LABS: WBC 12.5, CO2 58, Bicarb 37. Procal 3.04  BG 96, 104  DRAIN/DEVICES: PIV SL; with intermittent abx.   TELEMETRY RHYTHM: NA  SKIN: Scattered bruising and scabs  TESTS/PROCEDURES: None   D/C DAY/GOALS/PLACE: pending improvement  OTHER IMPORTANT INFO: On PO lasix, Zosyn q6hr

## 2023-12-24 NOTE — PLAN OF CARE
"Assessment and plan  Principal hospital problem: COPD exacerbation   Coping/psychosocial: calm, cooperative.  Cognitive: alert and oriented x 4.   Vital signs: on cont. O2 via NC. 4L.  Cardiovascular: denied chest pain.  Pulmonary: very congested, frequent productive cough, dyspnea, shortness of breath. Loud crackles in all [place]. Utilizing self-suctioning (oral), and neb treatment.  Musculoskeletal: generalized weakness.  Pain: chronic low back pain, managed with tylenol, oxycodone, and flexeril. He also as 2 lidocaine patches on his lower back.  IV Access/drains: new IV placed due to old one becoming leaky and painful. IV abx completed for this shift. saline locked.   Wound/Skin: skin is warm, dry, and intact. Scattered bruises present.  GI/: denied nausea and vomiting, active bowel sounds. Had a bm today. Diuresing with Lasix, receive an IV dose of lasix this shift.  Mir catheter: not present.  Diet: Mod carb diet, thin liquids.   Activity: assist of 1 with gait belt and walker.  Safety: alarms on, safety rounds completed.    End of shift summary: Patient appears to be in a lot of pain unrelieved by Tylenol, but he is hesitant to utilize the oxycodone and Flexeril because he is worried of not having any medications available \"if the pain gets even worse\".  Left note to provider to see if his medications could be modified to provide him more pain management options around-the-clock. Patient also asked if he could get chest physiotherapy, he already has a Respiratory care IP consult placed that is in-progress, note left for that as well. No significant event this shift. Report given to oncoming shift RN. Discharge pending safe disposition plan.    "

## 2023-12-24 NOTE — PROGRESS NOTES
Medicine Progress Note - Hospitalist Service    Date of Admission:  12/6/2023    Assessment & Plan   Harrison Thomas is a 78 year old male admitted on 12/6/2023. past medical history significant for chronic hypoxic respiratory failure [has oxygen as needed at home], COPD, RUL lung mass s/p radiation, untreated BRENNEN, HTN, HLD, CAD, hypothyroidism admitted on 12/6/2023 with acute on chronic hypoxic respiratory failure likely 2/2 COPD exacerbation.      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 Handy with pulmonology at [facility], last seen in September 2023.  On low dose chronic prednisone (2mg).  *Patient presented to the ED with increased shortness of breath over the past week, increased oxygen use at home.  *Admission clinical picture consistent with COPD exacerbation - poor air movement with diffuse expiratory wheezing - placed on steroids and nebs.  *BNP and procalcitonin wnl, COVID/flu/RSV negative on admission, resp viral panel negative (12/15).  *Admission CXR showed just faint residual opacities in the RUL corresponding to RUL mass and post-treatment changes.  *CT pulm angio 12/11 negative for PE, shows mild bronchial wall thickening and extensive emphysema.  *Underwent sniff test this admission that was negative.  *Received 5-day treatment course azithromycin at admission, resumed  mg every other day dosing for prophylaxis.  *Significantly increased oxygen needs 12/20 necessitating HFNC and Bipap.  *Pulm consulted and following.  *Transitioned from IV solumedrol to prednisone 12/20.  - Continue PTA azithromycin 500 mg every other day.  - Continue prednisone 40 mg daily.  - Duonebs qid.  - Home inhaler on hold.  - Encourage IS and flutter valve.  - Repeat procal 12/21 up to 3.04 (from 0.06 on 12/6) and CT chest 12/21 showing new  small patchy opacities of bilateral lower lobes and RML, mild bronchial thickening.  - Started zosyn on 12/21/23 for hospital acquired pneumonia, continue for now.  - MRSA nasal swab negative on 12/21/23.  - Respiratory culture obtained 12/22/23, growing 1+ yeast.  - Procalcitonin improved to 1.32 on 12/23/23.  - RRT on 12/23/23 due to increased shortness of breath and hypoxia. Improved with IV lasix and BiPAP. ? related to pulmonary edema/volume overload.  - Will give a dose of IV lasix again today and then increase daily oral lasix to 40 mg daily starting tomorrow morning.  - Was on BiPAP on 12/23/23, now back to supplemental oxygen by nasal cannula at 4 lpm, continue to wean as tolerated.  - Ambulatory oximetry prior to discharge to qualify for home oxygen.  - Follow up with sleep medicine at discharge for review of BRENNEN and qualification of CPAP.  - Pulm recommendations much appreciated.     Hypertension   Hyperlipidemia  Coronary artery disease   - Continue PTA metoprolol, amlodipine, aspirin, rosuvastatin and lasix.     Chronic low back pain   *Patient is supposed to be getting a lumbar MRI soon.  Can be done as an outpatient.  - Continue PTA oxycodone.  - Gabapentin changed from 600 mg bid to 400 mg tid, continue.  - Hold naproxen for now in the setting of high-dose steroids since he would be at high risk for upper GI bleed and ulcers.  - Oral PPI for prophylaxis.  - Flexeril 10 mg 3 times daily PRN.  - Scheduled Tylenol tid.  - Lidocaine patches.    Constipation  *Given concern for abdominal distention, obtained abdominal x-ray on 12/18 that still showed large amount of stool throughout the colon.  Started having bowel movement on 12/19/23 and has been having at least daily bowel movements since then.  - Continue on aggressive bowel regimen.    Urinary retention  *Required straight cath x3 12/20-12/21 and martinez placed.  - Start flomax 12/21/23.  - Trial of voiding on 12/23/23.   - Continue to monitor for  "retention following catheter removal.  - Aggressively treat constipation as above.     Hx of papillary thyroid cancer s/p thyroidectomy   Hypothyroidism   - Continue PTA levothyroxine.     Insomnia   - Continue PTA Trazodone.     Leukocytosis  - Related to steroids and pneumonia.  - WBC back to within normal limits as of 12/23/23.     Thrombocytopenia  Platelets in low 100s.  Not on any heparin products on admission through 12/20.  He is on baby aspirin twice daily, no signs of bleeding.  - Platelets 134 on 12/16/23, slowly trended down.  - Platelets 96 on 12/23/23, improved to 106 on 12/24/23.  - Lovenox prophylaxis started 12/21/23, will continue for now, consider stopping if platelets trend down further.     Bilateral pulmonary nodules  CT chest 12/11 showing scattered small bilateral pulmonary nodules measuring up to 5 mm are unchanged  - Continued surveillance recommended.           Diet: Moderate Consistent Carb (60 g CHO per Meal) Diet    DVT Prophylaxis: Enoxaparin (Lovenox) SQ  Mir Catheter: Not present  Lines: None     Cardiac Monitoring: None  Code Status: No CPR- Do NOT Intubate      Clinically Significant Risk Factors              # Hypoalbuminemia: Lowest albumin = 3.4 g/dL at 12/8/2023 11:58 AM, will monitor as appropriate   # Thrombocytopenia: Lowest platelets = 96 in last 2 days, will monitor for bleeding   # Hypertension: Noted on problem list        # Overweight: Estimated body mass index is 25.71 kg/m  as calculated from the following:    Height as of this encounter: 1.778 m (5' 10\").    Weight as of this encounter: 81.3 kg (179 lb 3.2 oz).             Disposition Plan     Expected Discharge Date: 12/25/2023        Discharge Comments: pt is on iv solumedrol            Tino Zabala MD  Hospitalist Service  [facility]  Securely message with [employer] (more info)  Text page via [employer] Paging/Directory "   ______________________________________________________________________    Interval History   Harrison Thomas was seen this morning. He had an episode of increased shortness of breath and hypoxia yesterday afternoon. RRT was called, improved after IV lasix and BiPAP, see RRT note for further details. Shortness of breath persists but has improved compared to yesterday. Denies fevers, chest pain, nausea, abdominal pain.    Physical Exam   Vital Signs: Temp: 98.3  F (36.8  C) Temp src: Oral BP: (!) 142/78 Pulse: 63   Resp: 18 SpO2: 97 % O2 Device: Nasal cannula Oxygen Delivery: 4 LPM  Weight: 179 lbs 3.2 oz    Constitutional: awake, alert, cooperative, no apparent distress, laying in the hospital bed with the head of the bed elevated  Respiratory: appears short of breath, mildly tachypneic, few crackles  Cardiovascular: regular rate and rhythm, normal S1 and S2, no murmur noted  GI: normal bowel sounds, soft, non-distended, non-tender  Skin: warm, dry  Musculoskeletal: no lower extremity pitting edema present  Neurologic: awake, alert, answers questions appropriately moves all extremities    Medical Decision Making       55 MINUTES SPENT BY ME on the date of service doing chart review, history, exam, documentation & further activities per the note.      Data     I have personally reviewed the following data over the past 24 hrs:    5.5  \   11.6 (L)   / 106 (L)     142 101 12.3 /  99   3.6 31 (H) 0.75 \       Imaging results reviewed over the past 24 hrs:   Recent Results (from the past 24 hour(s))   XR Chest Port 1 View    Narrative    EXAM: XR CHEST PORT 1 VIEW  LOCATION: [facility]  DATE: 12/23/2023    INDICATION: Increased WOB, previous abnormal CT chest exam  COMPARISON: CT chest 12/21/2023      Impression    IMPRESSION: Low lung volumes. Stable right upper lobe and patchy bibasilar airspace opacities consistent with pneumonia. Stable blunting of the right costophrenic recess likely  related to scarring. Stable heart size.   Echocardiogram Complete   Result Value    LVEF  60-65%    Narrative    [id number]  [facility]  [id number]  [id number]^MCKAY^SHRAVAN^JULIANNA     [facility]  Echocardiography Laboratory  [address] [place] [address], [place] [address]     Name: ABDIRASHID EPSTEIN  MRN: [id number]  : 1945  Study Date: 2023 09:08 AM  Age: 78 yrs  Gender: Male  Patient Location: [facility]  Reason For Study: CHF  Ordering Physician: SHRAVAN BASHIR  Referring Physician: SHRAVAN BASHIR  Performed By: Ciarra Mata     BSA: 2.0 m2  Height: 70 in  Weight: 179 lb  HR: 64  ______________________________________________________________________________  Procedure  Complete Echo Adult. Optison (NDC #[id number]) given intravenously.  ______________________________________________________________________________  Interpretation Summary     The visual ejection fraction is 60-65%.  The left ventricle is normal in structure, function and size.  No regional wall motion abnormalities noted.  The right ventricle is normal in structure, function and size.  There is no pericardial effusion.     No significant change when compared to 23 study  ______________________________________________________________________________  Left Ventricle  The left ventricle is normal in structure, function and size. There is normal  left ventricular wall thickness. The visual ejection fraction is 60-65%. Left  ventricular diastolic function is indeterminate. No regional wall motion  abnormalities noted.     Right Ventricle  The right ventricle is normal in structure, function and size.     Atria  Normal left atrial size. Right atrial size is normal.     Mitral Valve  There is mild (1+) mitral regurgitation.     Tricuspid Valve  There is trace to mild tricuspid regurgitation.     Aortic Valve  No aortic regurgitation is present.     Pulmonic Valve  The pulmonic valve is not well visualized.      Vessels  Normal size aorta. Normal size ascending aorta. The inferior vena cava is  normal.     Pericardium  There is no pericardial effusion.     ______________________________________________________________________________  MMode/2D Measurements & Calculations  IVSd: 1.1 cm  LVIDd: 3.8 cm  LVIDs: 2.3 cm  LVPWd: 0.95 cm  FS: 40.6 %     LV mass(C)d: 121.3 grams  LV mass(C)dI: 60.9 grams/m2  Ao root diam: 3.1 cm  asc Aorta Diam: 3.4 cm  LVOT diam: 2.0 cm  LVOT area: 3.1 cm2  Ao root diam index Ht(cm/m): 1.7  Ao root diam index BSA (cm/m2): 1.6  Asc Ao diam index BSA (cm/m2): 1.7  Asc Ao diam index Ht(cm/m): 1.9  LA Volume (BP): 55.5 ml  LA Volume Index (BP): 27.9 ml/m2     RWT: 0.50  TAPSE: 2.4 cm     Doppler Measurements & Calculations  MV E max yung: 112.0 cm/sec  MV A max ynug: 104.0 cm/sec  MV E/A: 1.1  MV dec slope: 619.0 cm/sec2  MV dec time: 0.18 sec  Ao V2 max: 153.0 cm/sec  Ao max P.0 mmHg  BALDEMAR(V,D): 2.4 cm2  LV V1 max P.5 mmHg  LV V1 max: 117.0 cm/sec  LV V1 VTI: 26.4 cm  SV(LVOT): 82.9 ml  SI(LVOT): 41.7 ml/m2  PA V2 max: 104.0 cm/sec  PA max P.3 mmHg  PA acc time: 0.07 sec  AV Yung Ratio (DI): 0.76  E/E' av.3  Lateral E/e': 10.3  Medial E/e': 12.4  RV S Yung: 18.7 cm/sec     ______________________________________________________________________________  Report approved by: MICHEL Duval 2023 10:19 AM

## 2023-12-24 NOTE — PLAN OF CARE
12/6/23 admit, Acute Respiratory Failure, COPD exacerbation  12/23/23, 7 a to 7 p    Orientation: A&O, calm and pleasant    Vitals/Tele: VSS on 3 L 02 intermittently on Bipap    IV Access/drains: CJ SL on intermittent abx    Diet: Mod carb, BG checks    Mobility: A1 GB and W    GI/: Has the tendency to retain, Mir removed around noon today    Wound/Skin: Scattered bruising    Consults: Hospitalist    Discharge Plan: TBD,     Others:  Use Bipap tonight, continue to observe need for Bipap then reassess for need of higher level of care  RRT called around 2.40 pm 12/23/23      See Flow sheets for assessment

## 2023-12-25 LAB
ANION GAP SERPL CALCULATED.3IONS-SCNC: 10 MMOL/L (ref 7–15)
BUN SERPL-MCNC: 15.6 MG/DL (ref 8–23)
CALCIUM SERPL-MCNC: 8.7 MG/DL (ref 8.8–10.2)
CHLORIDE SERPL-SCNC: 100 MMOL/L (ref 98–107)
CREAT SERPL-MCNC: 0.85 MG/DL (ref 0.67–1.17)
DEPRECATED HCO3 PLAS-SCNC: 32 MMOL/L (ref 22–29)
EGFRCR SERPLBLD CKD-EPI 2021: 90 ML/MIN/1.73M2
ERYTHROCYTE [DISTWIDTH] IN BLOOD BY AUTOMATED COUNT: 14.3 % (ref 10–15)
GLUCOSE BLDC GLUCOMTR-MCNC: 101 MG/DL (ref 70–99)
GLUCOSE BLDC GLUCOMTR-MCNC: 110 MG/DL (ref 70–99)
GLUCOSE BLDC GLUCOMTR-MCNC: 112 MG/DL (ref 70–99)
GLUCOSE BLDC GLUCOMTR-MCNC: 126 MG/DL (ref 70–99)
GLUCOSE BLDC GLUCOMTR-MCNC: 137 MG/DL (ref 70–99)
GLUCOSE BLDC GLUCOMTR-MCNC: 169 MG/DL (ref 70–99)
GLUCOSE BLDC GLUCOMTR-MCNC: 82 MG/DL (ref 70–99)
GLUCOSE SERPL-MCNC: 105 MG/DL (ref 70–99)
HCT VFR BLD AUTO: 37.4 % (ref 40–53)
HGB BLD-MCNC: 11.8 G/DL (ref 13.3–17.7)
MAGNESIUM SERPL-MCNC: 2.1 MG/DL (ref 1.7–2.3)
MCH RBC QN AUTO: 29.6 PG (ref 26.5–33)
MCHC RBC AUTO-ENTMCNC: 31.6 G/DL (ref 31.5–36.5)
MCV RBC AUTO: 94 FL (ref 78–100)
PLATELET # BLD AUTO: 137 10E3/UL (ref 150–450)
POTASSIUM SERPL-SCNC: 3.7 MMOL/L (ref 3.4–5.3)
RBC # BLD AUTO: 3.98 10E6/UL (ref 4.4–5.9)
SODIUM SERPL-SCNC: 142 MMOL/L (ref 135–145)
WBC # BLD AUTO: 5.9 10E3/UL (ref 4–11)

## 2023-12-25 PROCEDURE — 80048 BASIC METABOLIC PNL TOTAL CA: CPT | Performed by: HOSPITALIST

## 2023-12-25 PROCEDURE — 120N000001 HC R&B MED SURG/OB

## 2023-12-25 PROCEDURE — 250N000012 HC RX MED GY IP 250 OP 636 PS 637: Performed by: HOSPITALIST

## 2023-12-25 PROCEDURE — 999N000157 HC STATISTIC RCP TIME EA 10 MIN

## 2023-12-25 PROCEDURE — 36415 COLL VENOUS BLD VENIPUNCTURE: CPT | Performed by: HOSPITALIST

## 2023-12-25 PROCEDURE — 250N000013 HC RX MED GY IP 250 OP 250 PS 637: Performed by: INTERNAL MEDICINE

## 2023-12-25 PROCEDURE — 250N000009 HC RX 250: Performed by: STUDENT IN AN ORGANIZED HEALTH CARE EDUCATION/TRAINING PROGRAM

## 2023-12-25 PROCEDURE — 250N000013 HC RX MED GY IP 250 OP 250 PS 637: Performed by: HOSPITALIST

## 2023-12-25 PROCEDURE — 83735 ASSAY OF MAGNESIUM: CPT | Performed by: HOSPITALIST

## 2023-12-25 PROCEDURE — 94640 AIRWAY INHALATION TREATMENT: CPT | Mod: 76

## 2023-12-25 PROCEDURE — 85027 COMPLETE CBC AUTOMATED: CPT | Performed by: HOSPITALIST

## 2023-12-25 PROCEDURE — 250N000013 HC RX MED GY IP 250 OP 250 PS 637: Performed by: STUDENT IN AN ORGANIZED HEALTH CARE EDUCATION/TRAINING PROGRAM

## 2023-12-25 PROCEDURE — 250N000011 HC RX IP 250 OP 636: Performed by: HOSPITALIST

## 2023-12-25 PROCEDURE — 94640 AIRWAY INHALATION TREATMENT: CPT

## 2023-12-25 PROCEDURE — 99232 SBSQ HOSP IP/OBS MODERATE 35: CPT | Performed by: HOSPITALIST

## 2023-12-25 RX ADMIN — ENOXAPARIN SODIUM 40 MG: 40 INJECTION SUBCUTANEOUS at 10:23

## 2023-12-25 RX ADMIN — AMLODIPINE BESYLATE 5 MG: 5 TABLET ORAL at 20:47

## 2023-12-25 RX ADMIN — GABAPENTIN 400 MG: 100 CAPSULE ORAL at 16:13

## 2023-12-25 RX ADMIN — METOPROLOL SUCCINATE 50 MG: 50 TABLET, EXTENDED RELEASE ORAL at 20:45

## 2023-12-25 RX ADMIN — GABAPENTIN 400 MG: 100 CAPSULE ORAL at 08:50

## 2023-12-25 RX ADMIN — PANTOPRAZOLE SODIUM 40 MG: 40 TABLET, DELAYED RELEASE ORAL at 06:27

## 2023-12-25 RX ADMIN — PIPERACILLIN AND TAZOBACTAM 4.5 G: 4; .5 INJECTION, POWDER, FOR SOLUTION INTRAVENOUS at 18:23

## 2023-12-25 RX ADMIN — ACETAMINOPHEN 1000 MG: 500 TABLET, FILM COATED ORAL at 13:33

## 2023-12-25 RX ADMIN — LEVOTHYROXINE SODIUM 125 MCG: 125 TABLET ORAL at 08:50

## 2023-12-25 RX ADMIN — ACETAMINOPHEN 1000 MG: 500 TABLET, FILM COATED ORAL at 06:27

## 2023-12-25 RX ADMIN — FUROSEMIDE 40 MG: 40 TABLET ORAL at 08:50

## 2023-12-25 RX ADMIN — PIPERACILLIN AND TAZOBACTAM 4.5 G: 4; .5 INJECTION, POWDER, FOR SOLUTION INTRAVENOUS at 12:13

## 2023-12-25 RX ADMIN — IPRATROPIUM BROMIDE AND ALBUTEROL SULFATE 3 ML: .5; 3 SOLUTION RESPIRATORY (INHALATION) at 20:23

## 2023-12-25 RX ADMIN — INSULIN ASPART 1 UNITS: 100 INJECTION, SOLUTION INTRAVENOUS; SUBCUTANEOUS at 18:20

## 2023-12-25 RX ADMIN — DOCUSATE SODIUM 50 MG AND SENNOSIDES 8.6 MG 2 TABLET: 8.6; 5 TABLET, FILM COATED ORAL at 08:50

## 2023-12-25 RX ADMIN — TRAZODONE HYDROCHLORIDE 50 MG: 50 TABLET ORAL at 20:48

## 2023-12-25 RX ADMIN — IPRATROPIUM BROMIDE AND ALBUTEROL SULFATE 3 ML: .5; 3 SOLUTION RESPIRATORY (INHALATION) at 12:17

## 2023-12-25 RX ADMIN — PIPERACILLIN AND TAZOBACTAM 4.5 G: 4; .5 INJECTION, POWDER, FOR SOLUTION INTRAVENOUS at 00:43

## 2023-12-25 RX ADMIN — PIPERACILLIN AND TAZOBACTAM 4.5 G: 4; .5 INJECTION, POWDER, FOR SOLUTION INTRAVENOUS at 06:27

## 2023-12-25 RX ADMIN — ASPIRIN 81 MG: 81 TABLET, COATED ORAL at 20:45

## 2023-12-25 RX ADMIN — LIDOCAINE 2 PATCH: 4 PATCH TOPICAL at 08:49

## 2023-12-25 RX ADMIN — ROSUVASTATIN CALCIUM 10 MG: 10 TABLET, FILM COATED ORAL at 20:45

## 2023-12-25 RX ADMIN — OXYCODONE HYDROCHLORIDE 5 MG: 5 TABLET ORAL at 16:13

## 2023-12-25 RX ADMIN — ACETAMINOPHEN 1000 MG: 500 TABLET, FILM COATED ORAL at 20:47

## 2023-12-25 RX ADMIN — DULOXETINE HYDROCHLORIDE 20 MG: 20 CAPSULE, DELAYED RELEASE ORAL at 20:45

## 2023-12-25 RX ADMIN — IPRATROPIUM BROMIDE AND ALBUTEROL SULFATE 3 ML: .5; 3 SOLUTION RESPIRATORY (INHALATION) at 15:44

## 2023-12-25 RX ADMIN — CYCLOBENZAPRINE 10 MG: 10 TABLET, FILM COATED ORAL at 22:48

## 2023-12-25 RX ADMIN — OXYCODONE HYDROCHLORIDE 5 MG: 5 TABLET ORAL at 22:48

## 2023-12-25 RX ADMIN — ASPIRIN 81 MG: 81 TABLET, COATED ORAL at 08:51

## 2023-12-25 RX ADMIN — DULOXETINE HYDROCHLORIDE 20 MG: 20 CAPSULE, DELAYED RELEASE ORAL at 08:50

## 2023-12-25 RX ADMIN — GABAPENTIN 400 MG: 100 CAPSULE ORAL at 20:46

## 2023-12-25 RX ADMIN — PREDNISONE 40 MG: 20 TABLET ORAL at 08:50

## 2023-12-25 RX ADMIN — TAMSULOSIN HYDROCHLORIDE 0.4 MG: 0.4 CAPSULE ORAL at 08:50

## 2023-12-25 ASSESSMENT — ACTIVITIES OF DAILY LIVING (ADL)
ADLS_ACUITY_SCORE: 39
ADLS_ACUITY_SCORE: 39
ADLS_ACUITY_SCORE: 41
ADLS_ACUITY_SCORE: 39
ADLS_ACUITY_SCORE: 39
ADLS_ACUITY_SCORE: 41
ADLS_ACUITY_SCORE: 41

## 2023-12-25 NOTE — PROGRESS NOTES
[facility]    Medicine Progress Note - Hospitalist Service    Date of Admission:  12/6/2023    Assessment & Plan   Harrison Thomas is a 78 year old male admitted on 12/6/2023. past medical history significant for chronic hypoxic respiratory failure [has oxygen as needed at home], COPD, RUL lung mass s/p radiation, untreated BRENNEN, HTN, HLD, CAD, hypothyroidism admitted on 12/6/2023 with acute on chronic hypoxic respiratory failure likely 2/2 COPD exacerbation.      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 Handy with pulmonology at [facility], last seen in September 2023.  On low dose chronic prednisone (2mg).  *Patient presented to the ED with increased shortness of breath over the past week, increased oxygen use at home.  *Admission clinical picture consistent with COPD exacerbation - poor air movement with diffuse expiratory wheezing - placed on steroids and nebs.  *BNP and procalcitonin wnl, COVID/flu/RSV negative on admission, resp viral panel negative (12/15).  *Admission CXR showed just faint residual opacities in the RUL corresponding to RUL mass and post-treatment changes.  *CT pulm angio 12/11 negative for PE, shows mild bronchial wall thickening and extensive emphysema.  *Underwent sniff test this admission that was negative.  *Received 5-day treatment course azithromycin at admission, resumed  mg every other day dosing for prophylaxis.  *Significantly increased oxygen needs 12/20 necessitating HFNC and Bipap.  *Pulm consulted and following.  *Transitioned from IV solumedrol to prednisone 12/20.  - Continue PTA azithromycin 500 mg every other day.  - Continue prednisone 40 mg daily.  - Duonebs qid.  - Home inhaler on hold.  - Encourage IS and flutter valve.  - Repeat procal 12/21 up to 3.04 (from 0.06 on 12/6) and CT chest 12/21 showing new  small patchy opacities of bilateral lower lobes and RML, mild bronchial thickening.  - Started zosyn on 12/21/23 for hospital acquired pneumonia, continue for now.  - MRSA nasal swab negative on 12/21/23.  - Respiratory culture obtained 12/22/23, growing 1+ yeast.  - Procalcitonin improved to 1.32 on 12/23/23.  - RRT on 12/23/23 due to increased shortness of breath and hypoxia. Improved with IV lasix and BiPAP. ? related to pulmonary edema/volume overload.  - Gave another dose of IV lasix on 12/24/23 and increased daily oral lasix to 40 mg daily on 12/25/23.  - Was on BiPAP on 12/23/23, now back to supplemental oxygen by nasal cannula, currently at 2-3 lpm today, continue to wean as tolerated.  - Ambulatory oximetry prior to discharge to qualify for home oxygen.  - Follow up with sleep medicine at discharge for review of BRENNEN and qualification of CPAP.  - Pulm recommendations much appreciated.     Hypertension   Hyperlipidemia  Coronary artery disease   - Continue PTA metoprolol, amlodipine, aspirin, rosuvastatin and lasix.     Chronic low back pain   *Patient is supposed to be getting a lumbar MRI soon.  Can be done as an outpatient.  - Continue PTA oxycodone.  - Gabapentin changed from 600 mg bid to 400 mg tid, continue.  - Hold naproxen for now in the setting of high-dose steroids since he would be at high risk for upper GI bleed and ulcers.  - Oral PPI for prophylaxis.  - Flexeril 10 mg 3 times daily PRN.  - Scheduled Tylenol tid.  - Lidocaine patches.    Constipation  *Given concern for abdominal distention, obtained abdominal x-ray on 12/18 that still showed large amount of stool throughout the colon.  Started having bowel movement on 12/19/23 and has been having at least daily bowel movements since then.  - Continue on aggressive bowel regimen.    Urinary retention  *Required straight cath x3 12/20-12/21 and martinez placed.  - Started flomax 12/21/23.  - Trial of voiding on 12/23/23.   - Continue to monitor for  "retention following catheter removal.  - Aggressively treat constipation as above.     Hx of papillary thyroid cancer s/p thyroidectomy   Hypothyroidism   - Continue PTA levothyroxine.     Insomnia   - Continue PTA Trazodone.     Leukocytosis  - Related to steroids and pneumonia.  - WBC back to within normal limits as of 12/23/23.     Thrombocytopenia  Platelets in low 100s.  Not on any heparin products on admission through 12/20.  He is on baby aspirin twice daily, no signs of bleeding.  - Platelets 134 on 12/16/23, slowly trended down.  - Platelets 96 on 12/23/23, improved to 137 on 12/25/23.  - Lovenox prophylaxis started 12/21/23, continue for now as platelets trending back up.    Bilateral pulmonary nodules  CT chest 12/11 showing scattered small bilateral pulmonary nodules measuring up to 5 mm are unchanged  - Continued surveillance recommended.           Diet: Moderate Consistent Carb (60 g CHO per Meal) Diet    DVT Prophylaxis: Enoxaparin (Lovenox) SQ  Mir Catheter: Not present  Lines: None     Cardiac Monitoring: None  Code Status: No CPR- Do NOT Intubate      Clinically Significant Risk Factors              # Hypoalbuminemia: Lowest albumin = 3.4 g/dL at 12/8/2023 11:58 AM, will monitor as appropriate   # Thrombocytopenia: Lowest platelets = 106 in last 2 days, will monitor for bleeding   # Hypertension: Noted on problem list        # Overweight: Estimated body mass index is 25.71 kg/m  as calculated from the following:    Height as of this encounter: 1.778 m (5' 10\").    Weight as of this encounter: 81.3 kg (179 lb 3.2 oz).             Disposition Plan      Expected Discharge Date: 12/27/2023, 12:00 PM      Discharge Comments: pt is on iv solumedrol            Tino Zabala MD  Hospitalist Service  [facility]  Securely message with [employer] (more info)  Text page via [facility] Paging/Directory   ______________________________________________________________________    Interval " History   Harrison Thomas was seen this morning. He feels about the same today. Continues to feel short of breath, maybe slightly better today. Denies fevers, chest pain, nausea, abdominal pain.    Physical Exam   Vital Signs: Temp: 98.4  F (36.9  C) Temp src: Oral BP: (!) 147/86 Pulse: 61   Resp: 17 SpO2: 95 % O2 Device: Nasal cannula Oxygen Delivery: 2 LPM  Weight: 179 lbs 3.2 oz    Constitutional: awake, alert, cooperative, no apparent distress, laying in the hospital bed with the head of the bed elevated  Respiratory: appears short of breath, mildly tachypneic, few crackles  Cardiovascular: regular rate and rhythm, normal S1 and S2, no murmur noted  GI: normal bowel sounds, soft, non-distended, non-tender  Skin: warm, dry  Musculoskeletal: no lower extremity pitting edema present  Neurologic: awake, alert, answers questions appropriately moves all extremities    Medical Decision Making       40 MINUTES SPENT BY ME on the date of service doing chart review, history, exam, documentation & further activities per the note.      Data     I have personally reviewed the following data over the past 24 hrs:    5.9  \   11.8 (L)   / 137 (L)     142 100 15.6 /  105 (H)   3.7 32 (H) 0.85 \

## 2023-12-25 NOTE — PLAN OF CARE
Goal Outcome Evaluation:    Summary: Shortness of breath due to COPD exacerbation  DATE & TIME:12/24/23  Cognitive Concerns/ Orientation: AOx4; pleasant  BEHAVIOR & AGGRESSION TOOL COLOR: Green  ABNL VS/O2: BP elevated, other VSS. Significant ACEVES . Sats 91-93% on 1L - remains on 1L - sats 86-90 on RA   MOBILITY: A1 with GB/walker. Able to position self in bed  PAIN MANAGMENT: c/o back pain; received scheduled tylenol   DIET: Mod Carb diet  BOWEL/BLADDER: continent  ABNORMAL LABS: WBC 5.5,   DRAIN/DEVICES: PIV SL; with intermittent Zosyn  TELEMETRY RHYTHM: NA  SKIN: Scattered bruising and scabs - jose j BLE  TESTS/PROCEDURES: None   D/C DAY/GOALS/PLACE:   OTHER IMPORTANT INFO:LS coarse with expiratory wheezes, activity encouraged as tolerated - Respiratory consult pending

## 2023-12-25 NOTE — PLAN OF CARE
Orientation: alert and oriented x4     Vitals/Tele: vital signs stable on 3 LPM nasal cannula ex for elevated blood pressure     IV Access/drains: L PIV SL     Diet: mod carb     Mobility: assist of 1, gait belt/walker, deconditioned    GI/: continent of bowel and bladder- infrequently incontinent    Wound/Skin: scattered bruises/jose j discoloration    Consults: PT, respiratory    Discharge Plan: pending improvement    See Flow sheets for assessment

## 2023-12-25 NOTE — PLAN OF CARE
Goal Outcome Evaluation:DATE & TIME:12/25/23 0700- 1530  Cognitive Concerns/ Orientation: AOx4; pleasant  BEHAVIOR & AGGRESSION TOOL COLOR: Green  ABNL VS/O2: BP elevated, other VSS. Significant ACEVES . Sats 91-93% on 1L - 3Lper NC  MOBILITY: A1 with GB/walker. Able to position self in bed  PAIN MANAGMENT: c/o back pain; received scheduled tylenol, and lidocaine patch   DIET: Mod Carb diet, good appetite  BOWEL/BLADDER: continent, BM x 1 this shift, uses urinal.  ABNORMAL LABS: WBC 5.9 , BG 82/110  DRAIN/DEVICES: PIV SL; with intermittent Zosyn  TELEMETRY RHYTHM: NA  SKIN: Scattered bruising and scabs - jose j BLE  TESTS/PROCEDURES: None   D/C DAY/GOALS/PLACE:   OTHER IMPORTANT INFO: ACEVES, sats drops to low 80s with activity, LS coarse with expiratory wheezes, encouraged  IS, FV , on scheduled nebs.Productive cough, uses oral suction as needed.Pulmonology following.

## 2023-12-26 LAB
ANION GAP SERPL CALCULATED.3IONS-SCNC: 8 MMOL/L (ref 7–15)
BUN SERPL-MCNC: 13.1 MG/DL (ref 8–23)
CALCIUM SERPL-MCNC: 9.2 MG/DL (ref 8.8–10.2)
CHLORIDE SERPL-SCNC: 100 MMOL/L (ref 98–107)
CREAT SERPL-MCNC: 0.84 MG/DL (ref 0.67–1.17)
DEPRECATED HCO3 PLAS-SCNC: 34 MMOL/L (ref 22–29)
EGFRCR SERPLBLD CKD-EPI 2021: 90 ML/MIN/1.73M2
ERYTHROCYTE [DISTWIDTH] IN BLOOD BY AUTOMATED COUNT: 14.2 % (ref 10–15)
GLUCOSE BLDC GLUCOMTR-MCNC: 104 MG/DL (ref 70–99)
GLUCOSE BLDC GLUCOMTR-MCNC: 108 MG/DL (ref 70–99)
GLUCOSE BLDC GLUCOMTR-MCNC: 87 MG/DL (ref 70–99)
GLUCOSE BLDC GLUCOMTR-MCNC: 90 MG/DL (ref 70–99)
GLUCOSE SERPL-MCNC: 130 MG/DL (ref 70–99)
HCT VFR BLD AUTO: 40 % (ref 40–53)
HGB BLD-MCNC: 12.8 G/DL (ref 13.3–17.7)
MAGNESIUM SERPL-MCNC: 2.1 MG/DL (ref 1.7–2.3)
MCH RBC QN AUTO: 29.9 PG (ref 26.5–33)
MCHC RBC AUTO-ENTMCNC: 32 G/DL (ref 31.5–36.5)
MCV RBC AUTO: 94 FL (ref 78–100)
PLATELET # BLD AUTO: 169 10E3/UL (ref 150–450)
POTASSIUM SERPL-SCNC: 4 MMOL/L (ref 3.4–5.3)
PROCALCITONIN SERPL IA-MCNC: 0.25 NG/ML
RBC # BLD AUTO: 4.28 10E6/UL (ref 4.4–5.9)
SODIUM SERPL-SCNC: 142 MMOL/L (ref 135–145)
WBC # BLD AUTO: 4.6 10E3/UL (ref 4–11)

## 2023-12-26 PROCEDURE — 84295 ASSAY OF SERUM SODIUM: CPT | Performed by: HOSPITALIST

## 2023-12-26 PROCEDURE — 250N000012 HC RX MED GY IP 250 OP 636 PS 637: Performed by: HOSPITALIST

## 2023-12-26 PROCEDURE — 250N000013 HC RX MED GY IP 250 OP 250 PS 637: Performed by: HOSPITALIST

## 2023-12-26 PROCEDURE — 94640 AIRWAY INHALATION TREATMENT: CPT | Mod: 76

## 2023-12-26 PROCEDURE — 84145 PROCALCITONIN (PCT): CPT | Performed by: HOSPITALIST

## 2023-12-26 PROCEDURE — 250N000011 HC RX IP 250 OP 636: Performed by: HOSPITALIST

## 2023-12-26 PROCEDURE — 36415 COLL VENOUS BLD VENIPUNCTURE: CPT | Performed by: HOSPITALIST

## 2023-12-26 PROCEDURE — 999N000157 HC STATISTIC RCP TIME EA 10 MIN

## 2023-12-26 PROCEDURE — 250N000013 HC RX MED GY IP 250 OP 250 PS 637: Performed by: STUDENT IN AN ORGANIZED HEALTH CARE EDUCATION/TRAINING PROGRAM

## 2023-12-26 PROCEDURE — 250N000013 HC RX MED GY IP 250 OP 250 PS 637: Performed by: INTERNAL MEDICINE

## 2023-12-26 PROCEDURE — 83735 ASSAY OF MAGNESIUM: CPT | Performed by: HOSPITALIST

## 2023-12-26 PROCEDURE — 250N000009 HC RX 250: Performed by: STUDENT IN AN ORGANIZED HEALTH CARE EDUCATION/TRAINING PROGRAM

## 2023-12-26 PROCEDURE — 99233 SBSQ HOSP IP/OBS HIGH 50: CPT | Performed by: STUDENT IN AN ORGANIZED HEALTH CARE EDUCATION/TRAINING PROGRAM

## 2023-12-26 PROCEDURE — 85027 COMPLETE CBC AUTOMATED: CPT | Performed by: HOSPITALIST

## 2023-12-26 PROCEDURE — 120N000001 HC R&B MED SURG/OB

## 2023-12-26 PROCEDURE — 94640 AIRWAY INHALATION TREATMENT: CPT

## 2023-12-26 PROCEDURE — 99232 SBSQ HOSP IP/OBS MODERATE 35: CPT | Performed by: HOSPITALIST

## 2023-12-26 RX ORDER — CYCLOBENZAPRINE HCL 10 MG
10 TABLET ORAL EVERY 6 HOURS PRN
Status: DISCONTINUED | OUTPATIENT
Start: 2023-12-26 | End: 2024-01-10 | Stop reason: HOSPADM

## 2023-12-26 RX ADMIN — LEVOTHYROXINE SODIUM 125 MCG: 125 TABLET ORAL at 09:15

## 2023-12-26 RX ADMIN — PANTOPRAZOLE SODIUM 40 MG: 40 TABLET, DELAYED RELEASE ORAL at 06:38

## 2023-12-26 RX ADMIN — IPRATROPIUM BROMIDE AND ALBUTEROL SULFATE 3 ML: .5; 3 SOLUTION RESPIRATORY (INHALATION) at 19:23

## 2023-12-26 RX ADMIN — ASPIRIN 81 MG: 81 TABLET, COATED ORAL at 09:15

## 2023-12-26 RX ADMIN — AZITHROMYCIN DIHYDRATE 500 MG: 250 TABLET ORAL at 09:15

## 2023-12-26 RX ADMIN — PIPERACILLIN AND TAZOBACTAM 4.5 G: 4; .5 INJECTION, POWDER, FOR SOLUTION INTRAVENOUS at 00:42

## 2023-12-26 RX ADMIN — GABAPENTIN 400 MG: 100 CAPSULE ORAL at 09:15

## 2023-12-26 RX ADMIN — IPRATROPIUM BROMIDE AND ALBUTEROL SULFATE 3 ML: .5; 3 SOLUTION RESPIRATORY (INHALATION) at 15:26

## 2023-12-26 RX ADMIN — OXYCODONE HYDROCHLORIDE 5 MG: 5 TABLET ORAL at 22:06

## 2023-12-26 RX ADMIN — PIPERACILLIN AND TAZOBACTAM 4.5 G: 4; .5 INJECTION, POWDER, FOR SOLUTION INTRAVENOUS at 14:08

## 2023-12-26 RX ADMIN — ACETAMINOPHEN 1000 MG: 500 TABLET, FILM COATED ORAL at 06:38

## 2023-12-26 RX ADMIN — DULOXETINE HYDROCHLORIDE 20 MG: 20 CAPSULE, DELAYED RELEASE ORAL at 20:39

## 2023-12-26 RX ADMIN — ASPIRIN 81 MG: 81 TABLET, COATED ORAL at 20:39

## 2023-12-26 RX ADMIN — ENOXAPARIN SODIUM 40 MG: 40 INJECTION SUBCUTANEOUS at 14:08

## 2023-12-26 RX ADMIN — GABAPENTIN 400 MG: 100 CAPSULE ORAL at 17:09

## 2023-12-26 RX ADMIN — LIDOCAINE 2 PATCH: 4 PATCH TOPICAL at 09:15

## 2023-12-26 RX ADMIN — METOPROLOL SUCCINATE 50 MG: 50 TABLET, EXTENDED RELEASE ORAL at 20:39

## 2023-12-26 RX ADMIN — DOCUSATE SODIUM 50 MG AND SENNOSIDES 8.6 MG 2 TABLET: 8.6; 5 TABLET, FILM COATED ORAL at 09:14

## 2023-12-26 RX ADMIN — OXYCODONE HYDROCHLORIDE 5 MG: 5 TABLET ORAL at 02:53

## 2023-12-26 RX ADMIN — AMLODIPINE BESYLATE 5 MG: 5 TABLET ORAL at 22:06

## 2023-12-26 RX ADMIN — PIPERACILLIN AND TAZOBACTAM 4.5 G: 4; .5 INJECTION, POWDER, FOR SOLUTION INTRAVENOUS at 18:41

## 2023-12-26 RX ADMIN — TRAZODONE HYDROCHLORIDE 50 MG: 50 TABLET ORAL at 22:06

## 2023-12-26 RX ADMIN — FUROSEMIDE 40 MG: 40 TABLET ORAL at 09:15

## 2023-12-26 RX ADMIN — IPRATROPIUM BROMIDE AND ALBUTEROL SULFATE 3 ML: .5; 3 SOLUTION RESPIRATORY (INHALATION) at 07:11

## 2023-12-26 RX ADMIN — GABAPENTIN 400 MG: 100 CAPSULE ORAL at 22:06

## 2023-12-26 RX ADMIN — ACETAMINOPHEN 1000 MG: 500 TABLET, FILM COATED ORAL at 14:08

## 2023-12-26 RX ADMIN — CYCLOBENZAPRINE 10 MG: 10 TABLET, FILM COATED ORAL at 22:06

## 2023-12-26 RX ADMIN — DULOXETINE HYDROCHLORIDE 20 MG: 20 CAPSULE, DELAYED RELEASE ORAL at 09:15

## 2023-12-26 RX ADMIN — ROSUVASTATIN CALCIUM 10 MG: 10 TABLET, FILM COATED ORAL at 20:39

## 2023-12-26 RX ADMIN — TAMSULOSIN HYDROCHLORIDE 0.4 MG: 0.4 CAPSULE ORAL at 09:15

## 2023-12-26 RX ADMIN — PIPERACILLIN AND TAZOBACTAM 4.5 G: 4; .5 INJECTION, POWDER, FOR SOLUTION INTRAVENOUS at 06:39

## 2023-12-26 RX ADMIN — IPRATROPIUM BROMIDE AND ALBUTEROL SULFATE 3 ML: .5; 3 SOLUTION RESPIRATORY (INHALATION) at 11:34

## 2023-12-26 RX ADMIN — PREDNISONE 40 MG: 20 TABLET ORAL at 09:15

## 2023-12-26 RX ADMIN — ACETAMINOPHEN 1000 MG: 500 TABLET, FILM COATED ORAL at 22:06

## 2023-12-26 ASSESSMENT — ACTIVITIES OF DAILY LIVING (ADL)
ADLS_ACUITY_SCORE: 39

## 2023-12-26 NOTE — PROGRESS NOTES
[facility]    Pulmonary, Allergy, Critical Care and Sleep Medicine    Pulmonary Consult Progress Note    Harrison Thomas MRN# [id number]   Age: 78 year old YOB: 1945     Date of Admission: 12/6/2023  Date of Service: 12/26/2023     ==================================================  INTERVAL EVENTS:  -Down to 2 L nasal cannula satting 93%  -Afebrile  -Nursing notes reviewed  -MRSA nares negative, sputum culture contaminated with oral cells, repeat sputum candida      CHANGES FOR TODAY:  -Continue prednisone and antibiotics    ==================================================    ASSESSMENT AND RECOMMENDATIONS:    Harrison Thomas is a 78 year old male with a history of Moderate COPD, 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     ## Acute Hypoxic Respiratory Failure  ## Moderate COPD with Exacerbation  ## Right upper lobe lung mass status post radiation treatment with possible chronic radiation changes versus radiation pneumonitis  ## Seropositive RA (previously on methotrexate and prednisone, now off)  ## Moderate sleep apnea (AHI 20 in 2018)  ## Former smoker    History of right upper lobe mass without clear etiology after bronchoscopy and VATS biopsy.  Underwent empiric radiation therapy in mid 2022.  Outpatient pulmonologist Dr. Handy at the [facility].    Admitted following increased home use of oxygen and rescue therapies. Initially required 5L but has weaned down to minimal to no oxygen use.  BNP and procalcitonin negative on admission.  Chest imaging largely stable from prior with some bronchial wall thickening and chronic right upper lobe consolidation which was initially stable, but then increased on 12/18.  Repeat CT 12/21 with dense scattered bilateral infiltrates new from CT  "on 12/11.  Oxygen requirements worsened to the point that he is requiring high flow nasal cannula, then was able to decrease down to regular nasal cannula on 12/22.      Persistent severe wheezing with dependence on steroids, scheduled bronchodilators, mucolytic and flutter valve.  Respiratory viral panel negative.  Sniff test within normal limits. Abdominal distention likely impacting breathing as well given low reserve    No worsening in his symptoms or oxygen requirements since decreasing prednisone    -Follow-up with sleep medicine after discharge  -Duonebs with Mucomyst QID  -Flutter valve following nebs QID  -Agree with current steroids, drop by 10mg every 7 days and follow up with pulmonary in one month (sees Dr. Handy at [facility])  -Agree with antibiotics for HAP, complete course  -continue diuresis as tolerated based on kidney function (getting 40 mg of Lasix a day)      I have personally reviewed the daily labs, imaging studies, cultures and discussed the case with referring physician and consulting physicians.     I personally spent a total of 50  minutes (excluding procedures) including chart review, patient care, and documentation on 12/26/2023    Talib Beth MD  Pulmonary & Critical Care   Department of Medicine  Division of Pulmonary, Allergy, Critical Care and Sleep Medicine   [facility], [facility]  [phone] (Text Page)       ==================================================      PHYSICAL EXAM  BP (!) 171/96 (BP Location: Right arm)   Pulse 72   Temp 97.6  F (36.4  C) (Axillary)   Resp 22   Ht 1.778 m (5' 10\")   Wt 80.6 kg (177 lb 12.8 oz)   SpO2 93%   BMI 25.51 kg/m      In: 240 [P.O.:240]  Out: 900 [Urine:900]      Vitals:    12/18/23 0900 12/19/23 1700 12/25/23 1120   Weight: 83.2 kg (183 lb 6.4 oz) 81.3 kg (179 lb 3.2 oz) 80.6 kg (177 lb 12.8 oz)         General: Alert, interactive, more dyspneic appearing at rest but states that he doesn't feel worse  Resp: " "Predominant low pitched wheezing throughout, unchanged   Cardiac: RRR, NS1,S2, No m/r/g  Abdomen: Firm (but not rigid), though less so than yesterday, distended, tympanic to percussion with active bowel sounds  Extremities: Trace lower extremity edema  Skin: Warm and dry, no jaundice or rash  Neuro: Alert & oriented x 3, Cns 2-12 intact, moves all extremities equally      CT chest 12/21/2023  1.  Several small patchy opacities scattered within bilateral lower lobes and less so the right middle lobe, new since 12/11/2023. These likely represent pneumonia.  2.  Mild diffuse bronchial wall thickening, most likely infectious or inflammatory in etiology.  3.  Moderate to severe emphysematous changes in the lungs.    Chest x-ray 12/18  Increased consolidation in the right upper lobe since  comparison. Lungs otherwise clear.       Recent Labs   Lab Test 12/25/23  1226 12/24/23  1128 12/23/23  0859   WBC 5.9 5.5 7.8   RBC 3.98* 3.83* 3.69*   HGB 11.8* 11.6* 11.1*   * 106* 96*       Recent Labs   Lab Test 12/26/23  0722 12/25/23  2014 12/25/23  1711 12/25/23  1226 12/24/23  1256 12/24/23  1128 12/23/23  1246 12/23/23  0859 12/07/23  1652 12/06/23  1801   NA  --   --   --  142  --  142  --  143   < > 139   POTASSIUM  --   --   --  3.7  --  3.6  --  3.4   < > 4.3   CHLORIDE  --   --   --  100  --  101  --  104   < > 102   CO2  --   --   --  32*  --  31*  --  33*   < > 22   BUN  --   --   --  15.6  --  12.3  --  13.8   < > 17.6   CR  --   --   --  0.85  --  0.75  --  0.72   < > 0.87   GLC 87 126* 169* 105*   < > 96   < > 94   < > 213*   KARLIE  --   --   --  8.7*  --  8.8  --  8.1*   < > 9.5   MAG  --   --   --  2.1  --  2.0  --   --   --  2.3    < > = values in this interval not displayed.           No results for input(s): \"CULT\" in the last 168 hours.      No results found for this or any previous visit (from the past 48 hour(s)).        "

## 2023-12-26 NOTE — CONSULTS
SPIRITUAL HEALTH SERVICES - Consult Note  [facility]    Referral Source: Staff    Harrison was accompanied by his daughter Janey. I oriented pt to [facility]. Harrison requested that [facility] follow up tomorrow.     Plan: [facility] will plan to follow up with pt tomorrow.      Patricia Reis  Chaplain Resident    [facility] routine referrals *[phone]  [facility] available 24/7 for emergent requests/referrals, either by paging the on-call  or by entering an ASAP/STAT consult in Epic (this will also page the on-call ).

## 2023-12-26 NOTE — PROGRESS NOTES
[facility]    Medicine Progress Note - Hospitalist Service    Date of Admission:  12/6/2023    Assessment & Plan   Harrison Thomas is a 78 year old male admitted on 12/6/2023. past medical history significant for chronic hypoxic respiratory failure [has oxygen as needed at home], COPD, RUL lung mass s/p radiation, untreated BRENNEN, HTN, HLD, CAD, hypothyroidism admitted on 12/6/2023 with acute on chronic hypoxic respiratory failure likely 2/2 COPD exacerbation.      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 Handy with pulmonology at [facility], last seen in September 2023.  On low dose chronic prednisone (2mg).  *Patient presented to the ED with increased shortness of breath over the past week, increased oxygen use at home.  *Admission clinical picture consistent with COPD exacerbation - poor air movement with diffuse expiratory wheezing - placed on steroids and nebs.  *BNP and procalcitonin wnl, COVID/flu/RSV negative on admission, resp viral panel negative (12/15).  *Admission CXR showed just faint residual opacities in the RUL corresponding to RUL mass and post-treatment changes.  *CT pulm angio 12/11 negative for PE, shows mild bronchial wall thickening and extensive emphysema.  *Underwent sniff test this admission that was negative.  *Received 5-day treatment course azithromycin at admission, resumed  mg every other day dosing for prophylaxis.  *Significantly increased oxygen needs 12/20 necessitating HFNC and Bipap.  *Pulm consulted and following.  *Transitioned from IV solumedrol to prednisone 12/20.  - Continue PTA azithromycin 500 mg every other day.  - Continue prednisone 40 mg daily.  - Duonebs qid.  - Home inhaler on hold.  - Encourage IS and flutter valve.  - Repeat procal 12/21 up to 3.04 (from 0.06 on 12/6) and CT chest 12/21 showing new  small patchy opacities of bilateral lower lobes and RML, mild bronchial thickening.  - Started zosyn on 12/21/23 for hospital acquired pneumonia, continue for now.  - MRSA nasal swab negative on 12/21/23.  - Respiratory culture obtained 12/22/23, growing 1+ yeast.  - Procalcitonin improved to 1.32 on 12/23/23.  - RRT on 12/23/23 due to increased shortness of breath and hypoxia. Improved with IV lasix and BiPAP. ? related to pulmonary edema/volume overload.  - Gave another dose of IV lasix on 12/24/23 and increased daily oral lasix to 40 mg daily on 12/25/23.  - Was on BiPAP on 12/23/23, now back to supplemental oxygen by nasal cannula, currently at 2-3 lpm today, continue to wean as tolerated.  - Ambulatory oximetry prior to discharge to qualify for home oxygen.  - Follow up with sleep medicine at discharge for review of BRENNEN and qualification of CPAP.  - Pulm recommendations much appreciated.  - Discussed overall goals of care with patient, planning to continue current treatments for a few more days, if no significant improvement would consider palliative care consultation to discuss goals of care.     Hypertension   Hyperlipidemia  Coronary artery disease  - Continue PTA metoprolol, amlodipine, aspirin, rosuvastatin and lasix.     Chronic low back pain   *Patient is supposed to be getting a lumbar MRI soon.  Can be done as an outpatient.  - Continue PTA oxycodone.  - Gabapentin changed from 600 mg bid to 400 mg tid, continue.  - Hold naproxen for now in the setting of high-dose steroids since he would be at high risk for upper GI bleed and ulcers.  - Oral PPI for prophylaxis.  - Increase Flexeril to 10 mg every 6 hours PRN.  - Scheduled Tylenol tid.  - Lidocaine patches.    Constipation  *Given concern for abdominal distention, obtained abdominal x-ray on 12/18 that still showed large amount of stool throughout the colon.  Started having bowel movement on 12/19/23 and has been having at least daily bowel movements  "since then.  - Continue on aggressive bowel regimen.    Urinary retention  *Required straight cath x3 12/20-12/21 and martinez placed.  - Started flomax 12/21/23.  - Trial of voiding on 12/23/23.   - Continue to monitor for retention following catheter removal.  - Aggressively treat constipation as above.     Hx of papillary thyroid cancer s/p thyroidectomy   Hypothyroidism   - Continue PTA levothyroxine.     Insomnia   - Continue PTA Trazodone.     Leukocytosis  - Related to steroids and pneumonia.  - WBC back to within normal limits as of 12/23/23.     Thrombocytopenia  Platelets in low 100s.  Not on any heparin products on admission through 12/20.  He is on baby aspirin twice daily, no signs of bleeding.  - Platelets 134 on 12/16/23, slowly trended down.  - Platelets 96 on 12/23/23, improved to 137 on 12/25/23.  - Lovenox prophylaxis started 12/21/23, continue for now as platelets trending back up.    Bilateral pulmonary nodules  CT chest 12/11 showing scattered small bilateral pulmonary nodules measuring up to 5 mm are unchanged.  - Continued surveillance recommended.          Diet: Regular Diet Adult    DVT Prophylaxis: Enoxaparin (Lovenox) SQ  Martinez Catheter: Not present  Lines: None     Cardiac Monitoring: None  Code Status: No CPR- Do NOT Intubate      Clinically Significant Risk Factors              # Hypoalbuminemia: Lowest albumin = 3.4 g/dL at 12/8/2023 11:58 AM, will monitor as appropriate     # Hypertension: Noted on problem list        # Overweight: Estimated body mass index is 25.51 kg/m  as calculated from the following:    Height as of this encounter: 1.778 m (5' 10\").    Weight as of this encounter: 80.6 kg (177 lb 12.8 oz).             Disposition Plan      Expected Discharge Date: 12/27/2023, 12:00 PM      Discharge Comments: pt is on iv solumedrol            Tino Zabala MD  Hospitalist Service  [facility]  Securely message with [employer] (more info)  Text page via " [facility]   ______________________________________________________________________    Interval History   Hrarison Thomas was seen today. He feels about the same today. Ongoing shortness of breath, no significant change. Denies fevers, chest pain, nausea, abdominal pain. Wonders how much better his breathing will get and if this might be the end.    Physical Exam   Vital Signs: Temp: 97.6  F (36.4  C) Temp src: Axillary BP: (!) 171/96 Pulse: 72   Resp: 22 SpO2: 92 % O2 Device: Nasal cannula Oxygen Delivery: 2 LPM  Weight: 177 lbs 12.8 oz    Constitutional: awake, alert, cooperative, laying in the hospital bed with the head of the bed elevated  Respiratory: appears short of breath, mildly tachypneic, coarse lung sounds  Cardiovascular: regular rate and rhythm, normal S1 and S2, no murmur noted  GI: normal bowel sounds, soft, non-distended, non-tender  Skin: warm, dry  Musculoskeletal: no lower extremity pitting edema present  Neurologic: awake, alert, answers questions appropriately moves all extremities    Medical Decision Making       40 MINUTES SPENT BY ME on the date of service doing chart review, history, exam, documentation & further activities per the note.      Data     I have personally reviewed the following data over the past 24 hrs:    N/A  \   N/A   / N/A     N/A N/A N/A /  90   N/A N/A N/A \

## 2023-12-26 NOTE — PROGRESS NOTES
Pt is on a 2L NC (baseline) with SpO2 in the low 90's. Skin intact under oxygen device. BS diminished with some expiratory wheezes at times. All nebs were given as ordered.  Will cont to follow.  12/26/2023  Lia Nolan, RT

## 2023-12-26 NOTE — PROGRESS NOTES
Summary: Shortness of breath due to COPD exacerbation  DATE & TIME:12/25/23 4410-2237  Cognitive Concerns/ Orientation: AOx4; pleasant  BEHAVIOR & AGGRESSION TOOL COLOR: Green  ABNL VS/O2: VSS on 2L O2. Significant ACEVES.   MOBILITY: A1 with GB/walker. Able to position self in bed  PAIN MANAGMENT: Chronic back pain - PRN Oxy and Flexeril.  DIET: Mod Carb diet, good appetite  BOWEL/BLADDER: continent  ABNORMAL LABS: BG= 169, calcium=8.7, hgb=11.8, platelets=137   DRAIN/DEVICES: PIV SL; with intermittent Zosyn  TELEMETRY RHYTHM: NA  SKIN: Scattered bruising and scabs - jose j BLE. Small dime-sized discolored spot to middle of chest.   TESTS/PROCEDURES: None   D/C DAY/GOALS/PLACE: Home 1-2 days.  OTHER IMPORTANT INFO: on scheduled nebs, IV abx. Pulmonology following.

## 2023-12-26 NOTE — PLAN OF CARE
Goal Outcome Evaluation:         Summary: Shortness of breath due to COPD exacerbation  DATE & TIME:12/25/23, 3282-1067  Cognitive Concerns/ Orientation: AOx4; pleasant  BEHAVIOR & AGGRESSION TOOL COLOR: Green  ABNL VS/O2: VSS ex BP elevated, other VSS. Significant ACEVES . Sats 91-93% on 1-3L. Goal above 88% - intermittently weaning tolerating 1L    MOBILITY: A1 with GB/walker. Able to position self in bed  PAIN MANAGMENT: pt informed writer that his back pain is well managed this shift - scheduled Tylenol administered.   DIET: Mod Carb diet, good appetite  BOWEL/BLADDER: continent, x 1 BM this shift.   ABNORMAL LABS: BG= 169, calcium=8.7, hgb=11.8, platelets=137   DRAIN/DEVICES: PIV SL; with intermittent Zosyn  TELEMETRY RHYTHM: NA  SKIN: Scattered bruising and scabs - jose j BLE. Small dime-sized discolored spot to middle of chest.   TESTS/PROCEDURES: None   D/C DAY/GOALS/PLACE: pending clinical improvement  OTHER IMPORTANT INFO: on scheduled nebs. Pulmonology following.

## 2023-12-26 NOTE — PLAN OF CARE
Goal Outcome Evaluation:      Plan of Care Reviewed With: patient    Summary: Shortness of breath due to COPD exacerbation  DATE & TIME:12/25/23, 0672-5930  Cognitive Concerns/ Orientation: AOx4; pleasant  BEHAVIOR & AGGRESSION TOOL COLOR: Green  ABNL VS/O2: VSS ex BP elevated, other VSS. Significant ACEVES . Sats 91-93% on 1-3L. Goal above 88%    MOBILITY: A1 with GB/walker. Able to position self in bed  PAIN MANAGMENT: c/o back pain; received x 1 dose of PO oxycodone, and lidocaine patch in place middle of back-patient would like to discussion options on how to get an MRI of his back while he is here.   DIET: Mod Carb diet, good appetite  BOWEL/BLADDER: continent, x 1 BM this shift.   ABNORMAL LABS: BG= 169, calcium=8.7, hgb=11.8, platelets=137   DRAIN/DEVICES: PIV SL; with intermittent Zosyn  TELEMETRY RHYTHM: NA  SKIN: Scattered bruising and scabs - jose j BLE. Small dime-sized discolored spot to middle of chest.   TESTS/PROCEDURES: None   D/C DAY/GOALS/PLACE: pending clinical improvement  OTHER IMPORTANT INFO: LS coarse with expiratory wheezes, encouraged  IS, FV , on scheduled nebs. Pulmonology following.

## 2023-12-26 NOTE — PLAN OF CARE
Summary: Shortness of breath due to COPD exacerbation  DATE & TIME:12/26/23 Day shift  Cognitive Concerns/ Orientation: AOx4; pleasant  BEHAVIOR & AGGRESSION TOOL COLOR: Green  ABNL VS/O2: VSS on 2L O2. Significant ACEVES.   MOBILITY: A1 with GB/walker. Able to position self in bed; up to bathroom  PAIN MANAGMENT: Chronic back pain -  no PRNs given  DIET: Mod Carb diet, fair appetite  BOWEL/BLADDER: continent  ABNORMAL LABS: BGM 87/90  DRAIN/DEVICES: PIV SL; with intermittent Zosyn  TELEMETRY RHYTHM: NA  SKIN: Scattered bruising and scabs - jose j BLE. Small dime-sized discolored spot to middle of chest.   TESTS/PROCEDURES: None   D/C DAY/GOALS/PLACE: Home once off IV antibiotics  OTHER IMPORTANT INFO: on scheduled nebs, IV abx. Pulmonology and RT following.

## 2023-12-27 ENCOUNTER — APPOINTMENT (OUTPATIENT)
Dept: PHYSICAL THERAPY | Facility: CLINIC | Age: 78
DRG: 190 | End: 2023-12-27
Payer: MEDICARE

## 2023-12-27 LAB
GLUCOSE BLDC GLUCOMTR-MCNC: 101 MG/DL (ref 70–99)
GLUCOSE BLDC GLUCOMTR-MCNC: 109 MG/DL (ref 70–99)
GLUCOSE BLDC GLUCOMTR-MCNC: 132 MG/DL (ref 70–99)
GLUCOSE BLDC GLUCOMTR-MCNC: 149 MG/DL (ref 70–99)
GLUCOSE BLDC GLUCOMTR-MCNC: 90 MG/DL (ref 70–99)

## 2023-12-27 PROCEDURE — 999N000157 HC STATISTIC RCP TIME EA 10 MIN

## 2023-12-27 PROCEDURE — 250N000013 HC RX MED GY IP 250 OP 250 PS 637: Performed by: INTERNAL MEDICINE

## 2023-12-27 PROCEDURE — 250N000013 HC RX MED GY IP 250 OP 250 PS 637: Performed by: HOSPITALIST

## 2023-12-27 PROCEDURE — 97116 GAIT TRAINING THERAPY: CPT | Mod: GP

## 2023-12-27 PROCEDURE — 97110 THERAPEUTIC EXERCISES: CPT | Mod: GP

## 2023-12-27 PROCEDURE — 250N000013 HC RX MED GY IP 250 OP 250 PS 637: Performed by: STUDENT IN AN ORGANIZED HEALTH CARE EDUCATION/TRAINING PROGRAM

## 2023-12-27 PROCEDURE — 250N000012 HC RX MED GY IP 250 OP 636 PS 637: Performed by: STUDENT IN AN ORGANIZED HEALTH CARE EDUCATION/TRAINING PROGRAM

## 2023-12-27 PROCEDURE — 99232 SBSQ HOSP IP/OBS MODERATE 35: CPT | Performed by: HOSPITALIST

## 2023-12-27 PROCEDURE — 250N000011 HC RX IP 250 OP 636: Performed by: HOSPITALIST

## 2023-12-27 PROCEDURE — 99233 SBSQ HOSP IP/OBS HIGH 50: CPT | Performed by: STUDENT IN AN ORGANIZED HEALTH CARE EDUCATION/TRAINING PROGRAM

## 2023-12-27 PROCEDURE — 94640 AIRWAY INHALATION TREATMENT: CPT

## 2023-12-27 PROCEDURE — 250N000009 HC RX 250: Performed by: HOSPITALIST

## 2023-12-27 PROCEDURE — 120N000001 HC R&B MED SURG/OB

## 2023-12-27 PROCEDURE — 250N000009 HC RX 250: Performed by: STUDENT IN AN ORGANIZED HEALTH CARE EDUCATION/TRAINING PROGRAM

## 2023-12-27 PROCEDURE — 94640 AIRWAY INHALATION TREATMENT: CPT | Mod: 76

## 2023-12-27 RX ORDER — PREDNISONE 10 MG/1
10 TABLET ORAL DAILY
Status: DISCONTINUED | OUTPATIENT
Start: 2024-01-10 | End: 2024-01-10 | Stop reason: HOSPADM

## 2023-12-27 RX ORDER — PREDNISONE 5 MG/1
5 TABLET ORAL DAILY
Status: DISCONTINUED | OUTPATIENT
Start: 2024-01-17 | End: 2024-01-10 | Stop reason: HOSPADM

## 2023-12-27 RX ORDER — ACETYLCYSTEINE 200 MG/ML
2 SOLUTION ORAL; RESPIRATORY (INHALATION) EVERY 4 HOURS
Status: DISCONTINUED | OUTPATIENT
Start: 2023-12-27 | End: 2024-01-10 | Stop reason: HOSPADM

## 2023-12-27 RX ORDER — PREDNISONE 20 MG/1
20 TABLET ORAL DAILY
Status: COMPLETED | OUTPATIENT
Start: 2024-01-03 | End: 2024-01-09

## 2023-12-27 RX ADMIN — ACETYLCYSTEINE 2 ML: 200 SOLUTION ORAL; RESPIRATORY (INHALATION) at 15:49

## 2023-12-27 RX ADMIN — PREDNISONE 30 MG: 20 TABLET ORAL at 08:39

## 2023-12-27 RX ADMIN — DOCUSATE SODIUM 50 MG AND SENNOSIDES 8.6 MG 2 TABLET: 8.6; 5 TABLET, FILM COATED ORAL at 08:40

## 2023-12-27 RX ADMIN — PIPERACILLIN AND TAZOBACTAM 4.5 G: 4; .5 INJECTION, POWDER, FOR SOLUTION INTRAVENOUS at 05:17

## 2023-12-27 RX ADMIN — PANTOPRAZOLE SODIUM 40 MG: 40 TABLET, DELAYED RELEASE ORAL at 05:44

## 2023-12-27 RX ADMIN — TAMSULOSIN HYDROCHLORIDE 0.4 MG: 0.4 CAPSULE ORAL at 08:42

## 2023-12-27 RX ADMIN — ASPIRIN 81 MG: 81 TABLET, COATED ORAL at 08:40

## 2023-12-27 RX ADMIN — PIPERACILLIN AND TAZOBACTAM 4.5 G: 4; .5 INJECTION, POWDER, FOR SOLUTION INTRAVENOUS at 01:04

## 2023-12-27 RX ADMIN — ROSUVASTATIN CALCIUM 10 MG: 10 TABLET, FILM COATED ORAL at 19:26

## 2023-12-27 RX ADMIN — LEVOTHYROXINE SODIUM 125 MCG: 125 TABLET ORAL at 08:39

## 2023-12-27 RX ADMIN — OXYCODONE HYDROCHLORIDE 5 MG: 5 TABLET ORAL at 23:10

## 2023-12-27 RX ADMIN — IPRATROPIUM BROMIDE AND ALBUTEROL SULFATE 3 ML: .5; 3 SOLUTION RESPIRATORY (INHALATION) at 23:26

## 2023-12-27 RX ADMIN — METOPROLOL SUCCINATE 50 MG: 50 TABLET, EXTENDED RELEASE ORAL at 19:26

## 2023-12-27 RX ADMIN — AMLODIPINE BESYLATE 5 MG: 5 TABLET ORAL at 21:12

## 2023-12-27 RX ADMIN — DULOXETINE HYDROCHLORIDE 20 MG: 20 CAPSULE, DELAYED RELEASE ORAL at 08:40

## 2023-12-27 RX ADMIN — GABAPENTIN 400 MG: 100 CAPSULE ORAL at 16:49

## 2023-12-27 RX ADMIN — ASPIRIN 81 MG: 81 TABLET, COATED ORAL at 21:12

## 2023-12-27 RX ADMIN — ACETAMINOPHEN 1000 MG: 500 TABLET, FILM COATED ORAL at 05:17

## 2023-12-27 RX ADMIN — TRAZODONE HYDROCHLORIDE 50 MG: 50 TABLET ORAL at 23:10

## 2023-12-27 RX ADMIN — DULOXETINE HYDROCHLORIDE 20 MG: 20 CAPSULE, DELAYED RELEASE ORAL at 21:12

## 2023-12-27 RX ADMIN — FUROSEMIDE 40 MG: 40 TABLET ORAL at 08:39

## 2023-12-27 RX ADMIN — LIDOCAINE 2 PATCH: 4 PATCH TOPICAL at 08:40

## 2023-12-27 RX ADMIN — PIPERACILLIN AND TAZOBACTAM 4.5 G: 4; .5 INJECTION, POWDER, FOR SOLUTION INTRAVENOUS at 12:49

## 2023-12-27 RX ADMIN — IPRATROPIUM BROMIDE AND ALBUTEROL SULFATE 3 ML: .5; 3 SOLUTION RESPIRATORY (INHALATION) at 15:49

## 2023-12-27 RX ADMIN — CYCLOBENZAPRINE 10 MG: 10 TABLET, FILM COATED ORAL at 23:10

## 2023-12-27 RX ADMIN — ACETYLCYSTEINE 2 ML: 200 SOLUTION ORAL; RESPIRATORY (INHALATION) at 19:34

## 2023-12-27 RX ADMIN — IPRATROPIUM BROMIDE AND ALBUTEROL SULFATE 3 ML: .5; 3 SOLUTION RESPIRATORY (INHALATION) at 19:34

## 2023-12-27 RX ADMIN — PIPERACILLIN AND TAZOBACTAM 4.5 G: 4; .5 INJECTION, POWDER, FOR SOLUTION INTRAVENOUS at 19:03

## 2023-12-27 RX ADMIN — GABAPENTIN 400 MG: 100 CAPSULE ORAL at 08:39

## 2023-12-27 RX ADMIN — GABAPENTIN 400 MG: 100 CAPSULE ORAL at 21:12

## 2023-12-27 RX ADMIN — INSULIN ASPART 1 UNITS: 100 INJECTION, SOLUTION INTRAVENOUS; SUBCUTANEOUS at 12:44

## 2023-12-27 RX ADMIN — ACETAMINOPHEN 1000 MG: 500 TABLET, FILM COATED ORAL at 14:23

## 2023-12-27 RX ADMIN — IPRATROPIUM BROMIDE AND ALBUTEROL SULFATE 3 ML: .5; 3 SOLUTION RESPIRATORY (INHALATION) at 08:13

## 2023-12-27 RX ADMIN — ENOXAPARIN SODIUM 40 MG: 40 INJECTION SUBCUTANEOUS at 12:49

## 2023-12-27 RX ADMIN — ACETYLCYSTEINE 2 ML: 200 SOLUTION ORAL; RESPIRATORY (INHALATION) at 23:26

## 2023-12-27 RX ADMIN — ACETAMINOPHEN 1000 MG: 500 TABLET, FILM COATED ORAL at 21:12

## 2023-12-27 ASSESSMENT — ACTIVITIES OF DAILY LIVING (ADL)
ADLS_ACUITY_SCORE: 39

## 2023-12-27 NOTE — CONSULTS
SPIRITUAL HEALTH SERVICES - Consult Note  [facility]    Referral Source: Patient    I oriented Harrison to [facility]. Pt shared that [facility] and spirituality aren't important to him at this time. Harrison affirmed he has a strong support system around him and that this hospital stay has had a lot of ups and downs. Pt declined further processing at this time and shared he would reach out to his nurse for [facility] should his needs change.    Plan: Informed pt how he can request further  support.      Patricia Reis  Chaplain Resident    [facility] routine referrals *[phone]  [facility] available 24/7 for emergent requests/referrals, either by paging the on-call  or by entering an ASAP/STAT consult in Epic (this will also page the on-call ).

## 2023-12-27 NOTE — PLAN OF CARE
Goal Outcome Evaluation:         Summary: COPD exacerbation    DATE & TIME:12/26/2023-12/27/2023 3337-4496     Cognitive Concerns/ Orientation: A&O x4  BEHAVIOR & AGGRESSION TOOL COLOR: Green  ABNL VS/O2: VSS on 2L O2. Significant ACEVES with minimal activity  MOBILITY: Assist x1 with GB/walker  PAIN MANAGMENT: Chronic back pain- Scheduled Tylenol given  DIET: Mod Carb diet  BOWEL/BLADDER: Continent- Urinal at bedside, No BM this shift  ABNORMAL LABS:   DRAIN/DEVICES: L PIV SL; intermittent Zosyn  TELEMETRY RHYTHM: NA  SKIN: Scattered bruising and scabs - jose j BLE. Small dime-sized discolored spot to middle of chest.   TESTS/PROCEDURES: None   D/C DAY/GOALS/PLACE: Home once off IV antibiotics  OTHER IMPORTANT INFO: on scheduled nebs, IV abx. Pulmonology and RT following.

## 2023-12-27 NOTE — PLAN OF CARE
Summary: COPD exacerbation  DATE & TIME: 12/27/2023 Day shift  Cognitive Concerns/ Orientation: A&O x4  BEHAVIOR & AGGRESSION TOOL COLOR: Green  ABNL VS/O2: VSS on 2L O2. Significant ACEVES with minimal activity  MOBILITY: Ind- up to chair and bathroom well  PAIN MANAGMENT: Chronic back pain- Scheduled Tylenol given; 2 lidocaine patches on back  DIET: Mod Carb diet- fair appetite  BOWEL/BLADDER: Continent- using bathroom and had large BM  ABNORMAL LABS: /149  DRAIN/DEVICES: L PIV SL; intermittent Zosyn  TELEMETRY RHYTHM: NA  SKIN: Scattered bruising and scabs - jose j BLE. Small dime-sized discolored spot to middle of chest.   TESTS/PROCEDURES: None   D/C DAY/GOALS/PLACE: Home once off IV antibiotics  OTHER IMPORTANT INFO: on scheduled nebs, IV abx. Pulmonology and RT following.       Goal Outcome Evaluation:

## 2023-12-27 NOTE — PROGRESS NOTES
[employer]    Medicine Progress Note - Hospitalist Service    Date of Admission:  12/6/2023    Assessment & Plan   Harrison Thomas is a 78 year old male admitted on 12/6/2023. past medical history significant for chronic hypoxic respiratory failure [has oxygen as needed at home], COPD, RUL lung mass s/p radiation, untreated BRENNEN, HTN, HLD, CAD, hypothyroidism admitted on 12/6/2023 with acute on chronic hypoxic respiratory failure likely 2/2 COPD exacerbation.      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 Handy with pulmonology at [facility], last seen in September 2023.  On low dose chronic prednisone (2mg).  *Patient presented to the ED with increased shortness of breath over the past week, increased oxygen use at home.  *Admission clinical picture consistent with COPD exacerbation - poor air movement with diffuse expiratory wheezing - placed on steroids and nebs.  *BNP and procalcitonin wnl, COVID/flu/RSV negative on admission, resp viral panel negative (12/15).  *Admission CXR showed just faint residual opacities in the RUL corresponding to RUL mass and post-treatment changes.  *CT pulm angio 12/11 negative for PE, shows mild bronchial wall thickening and extensive emphysema.  *Underwent sniff test this admission that was negative.  *Received 5-day treatment course azithromycin at admission, resumed  mg every other day dosing for prophylaxis.  *Significantly increased oxygen needs 12/20 necessitating HFNC and Bipap.  *Pulm consulted and following.  *Transitioned from IV solumedrol to prednisone 12/20.  - Continue PTA azithromycin 500 mg every other day.  - Continue prednisone 40 mg daily.  - Duonebs qid.  - Home inhaler on hold.  - Encourage IS and flutter valve.  - Repeat procal 12/21 up to 3.04 (from 0.06 on 12/6) and CT chest 12/21 showing new  small patchy opacities of bilateral lower lobes and RML, mild bronchial thickening.  - Started zosyn on 12/21/23 for hospital acquired pneumonia, will complete 7 day course in AM on 12/28/23.  - MRSA nasal swab negative on 12/21/23.  - Respiratory culture obtained 12/22/23, growing 1+ yeast.  - Procalcitonin improved to 1.32 on 12/23/23 and then 0.25 on 12/26/23.  - RRT on 12/23/23 due to increased shortness of breath and hypoxia. Improved with IV lasix and BiPAP. ? related to pulmonary edema/volume overload.  - Gave another dose of IV lasix on 12/24/23 and increased daily oral lasix to 40 mg daily on 12/25/23.  - Was on BiPAP on 12/23/23, now back to supplemental oxygen by nasal cannula, currently at 2 lpm on 12/27/23, continue to wean as tolerated.  - Ambulatory oximetry prior to discharge to qualify for home oxygen.  - Follow up with sleep medicine at discharge for review of BRENENN and qualification of CPAP.  - Pulm recommendations much appreciated.  - Discussed overall goals of care with patient on 12/26/23, planning to continue current treatments for a few more days, if no significant improvement would consider palliative care consultation to discuss goals of care.     Hypertension   Hyperlipidemia  Coronary artery disease  - Continue PTA metoprolol, amlodipine, aspirin, rosuvastatin and lasix.     Chronic low back pain   *Patient is supposed to be getting a lumbar MRI soon.  Can be done as an outpatient.  - Continue PTA oxycodone.  - Gabapentin changed from 600 mg bid to 400 mg tid, continue.  - Hold naproxen for now in the setting of high-dose steroids since he would be at high risk for upper GI bleed and ulcers.  - Oral PPI for prophylaxis.  - Continue PRN Flexeril.  - Scheduled Tylenol tid.  - Lidocaine patches.    Constipation, resolved  *Given concern for abdominal distention, obtained abdominal x-ray on 12/18 that still showed large amount of stool throughout the colon.  Started having bowel movement on  "12/19/23 and has been having at least daily bowel movements since then.  - Continue on aggressive bowel regimen.    Urinary retention  *Required straight cath x3 12/20-12/21 and martinez placed.  - Started flomax 12/21/23.  - Successful trial of voiding on 12/23/23.   - Continue to monitor for retention following catheter removal.  - Aggressively treat constipation as above.     Hx of papillary thyroid cancer s/p thyroidectomy   Hypothyroidism   - Continue PTA levothyroxine.     Insomnia   - Continue PTA Trazodone.     Leukocytosis  - Related to steroids and pneumonia.  - WBC back to within normal limits as of 12/23/23.     Thrombocytopenia, resolved  Platelets in low 100s.  Not on any heparin products on admission through 12/20.  He is on baby aspirin twice daily, no signs of bleeding.  - Platelets 134 on 12/16/23, slowly trended down.  - Platelets 96 on 12/23/23, improved to 169 on 12/26/23.  - Lovenox prophylaxis started 12/21/23, continue for now as platelets trending back up.    Bilateral pulmonary nodules  CT chest 12/11 showing scattered small bilateral pulmonary nodules measuring up to 5 mm are unchanged.  - Continued surveillance recommended.          Diet: Regular Diet Adult    DVT Prophylaxis: Enoxaparin (Lovenox) SQ  Martinez Catheter: Not present  Lines: None     Cardiac Monitoring: None  Code Status: No CPR- Do NOT Intubate      Clinically Significant Risk Factors              # Hypoalbuminemia: Lowest albumin = 3.4 g/dL at 12/8/2023 11:58 AM, will monitor as appropriate     # Hypertension: Noted on problem list        # Overweight: Estimated body mass index is 25.51 kg/m  as calculated from the following:    Height as of this encounter: 1.778 m (5' 10\").    Weight as of this encounter: 80.6 kg (177 lb 12.8 oz).             Disposition Plan      Expected Discharge Date: 12/28/2023, 12:00 PM      Discharge Comments: pt is on iv solumedrol            Tino Zabala MD  Hospitalist Service  [facility] " [facility]  Securely message with [employer] (more info)  Text page via [employer] Paging/Directory   ______________________________________________________________________    Interval History   Harrison Thomas was seen today.  He feels about the same today, maybe a little better.  Still gets short of breath with minimal activity.  Denies fevers, chest pain, nausea, abdominal pain. Having 1-2 bowel movements per day.    Physical Exam   Vital Signs: Temp: 97.5  F (36.4  C) Temp src: Oral BP: (!) 148/86 Pulse: 72   Resp: 18 SpO2: 92 % O2 Device: Nasal cannula Oxygen Delivery: 2 LPM  Weight: 177 lbs 12.8 oz    Constitutional: awake, alert, cooperative, laying in the hospital bed with the head of the bed elevated  Respiratory: appears more comfortable today, coarse lung sounds and wheezes  Cardiovascular: regular rate and rhythm, normal S1 and S2, no murmur noted  GI: normal bowel sounds, soft, non-distended, non-tender  Skin: warm, dry  Musculoskeletal: trace lower extremity pitting edema present  Neurologic: awake, alert, answers questions appropriately moves all extremities    Medical Decision Making       40 MINUTES SPENT BY ME on the date of service doing chart review, history, exam, documentation & further activities per the note.      Data

## 2023-12-27 NOTE — PLAN OF CARE
Goal Outcome Evaluation:         Summary: Shortness of breath due to COPD exacerbation  DATE & TIME:12/26/23 evening  Cognitive Concerns/ Orientation: AOx4; pleasant  BEHAVIOR & AGGRESSION TOOL COLOR: Green  ABNL VS/O2: VSS on 2L O2. Significant ACEVES with minimal activity - notice intermittent tachypnea but comfortable at rest  MOBILITY: A1 with GB/walker. Able to position self in bed; up to bathroom - using urinal at bedside as well  PAIN MANAGMENT: Chronic back pain -  oxy and flexeril x1  DIET: Mod Carb diet, fair appetite  BOWEL/BLADDER: continent  ABNORMAL LABS: /108  DRAIN/DEVICES: PIV SL; with intermittent Zosyn  TELEMETRY RHYTHM: NA  SKIN: Scattered bruising and scabs - jose j BLE. Small dime-sized discolored spot to middle of chest.   TESTS/PROCEDURES: None   D/C DAY/GOALS/PLACE: Home once off IV antibiotics  OTHER IMPORTANT INFO: on scheduled nebs, IV abx. Pulmonology and RT following.

## 2023-12-27 NOTE — PROGRESS NOTES
[facility]    Pulmonary, Allergy, Critical Care and Sleep Medicine    Pulmonary Consult Progress Note    Harrison Thomas MRN# [id number]   Age: 78 year old YOB: 1945     Date of Admission: 12/6/2023  Date of Service: 12/27/2023     ==================================================  INTERVAL EVENTS:  Down to 2 L nasal cannula satting 96%  Afebrile  Nursing notes reviewed  Still with dyspnea on exertion with activity, even just walking to the bathroom  Zosyn since 12/21  1.2 L net negative      CHANGES FOR TODAY:  -Continue prednisone , taper ordered  -Continue antibiotics  -Added back Mucomyst  -Daily weights  -May need additional diuresis    ==================================================    ASSESSMENT AND RECOMMENDATIONS:    Harrison Thomas is a 78 year old male with a history of Moderate COPD, 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     ## Acute Hypoxic Respiratory Failure  ## Moderate COPD with Exacerbation  ## HAP  ## Right upper lobe lung mass status post radiation treatment with possible chronic radiation changes versus radiation pneumonitis  ## Seropositive RA (previously on methotrexate and prednisone, now off)  ## Moderate sleep apnea (AHI 20 in 2018)  ## Former smoker    History of right upper lobe mass without clear etiology after bronchoscopy and VATS biopsy.  Underwent empiric radiation therapy in mid 2022.  Outpatient pulmonologist Dr. Handy at the [facility].    Admitted following increased home use of oxygen and rescue therapies. Initially required 5L but has weaned down to minimal to no oxygen use.  BNP and procalcitonin negative on admission.  Chest imaging largely stable from prior with some bronchial wall thickening and chronic right upper lobe consolidation  which was initially stable, but then increased on 12/18.  Repeat CT 12/21 with dense scattered bilateral infiltrates new from CT on 12/11.  Oxygen requirements worsened to the point that he is requiring high flow nasal cannula, then was able to decrease down to regular nasal cannula on 12/22.      Persistent severe wheezing with dependence on steroids, scheduled bronchodilators, mucolytic and flutter valve.  Respiratory viral panel negative.  Sniff test within normal limits. Abdominal distention likely impacting breathing as well given low reserve    12/27/2023: Added back Mucomyst given his clinical improvement and has appeared to stall.  May need to increase diuresis as well if not admitted in the next few days      -Echo (12/24): Normal EF  -Follow-up with sleep medicine after discharge  -Duonebs   -Mucomyst QID  added back 12/27/2023 (do with DuoNebs)  -Flutter valve following nebs QID  -Agree with current steroids, drop by 10mg every 7 days   -Decrease to 30 mg prednisone (12/27)  -Steroid taper ordered  -follow up with pulmonary in one month (sees Dr. Handy at [facility])  -Agree with antibiotics for HAP, procal improved  -continue diuresis as tolerated based on kidney function (getting 40 mg of Lasix a day)  --> may need to increase dose given persistent bronchial breath sounds      I have personally reviewed the daily labs, imaging studies, cultures and discussed the case with referring physician and consulting physicians.     I personally spent a total of 50  minutes (excluding procedures) including chart review, patient care, and documentation on 12/27/2023    Talib Beth MD  Pulmonary & Critical Care   Department of Medicine  Division of Pulmonary, Allergy, Critical Care and Sleep Medicine   [facility], [facility]  [phone] (Text Page)       ==================================================      PHYSICAL EXAM  BP (!) 145/88 (BP Location: Left arm, Patient Position: Semi-Rodriguez's, Cuff  "Size: Adult Regular)   Pulse 64   Temp 98.5  F (36.9  C) (Oral)   Resp 18   Ht 1.778 m (5' 10\")   Wt 80.6 kg (177 lb 12.8 oz)   SpO2 96%   BMI 25.51 kg/m      In: 450 [P.O.:450]  Out: 1725 [Urine:1725]      Vitals:    12/18/23 0900 12/19/23 1700 12/25/23 1120   Weight: 83.2 kg (183 lb 6.4 oz) 81.3 kg (179 lb 3.2 oz) 80.6 kg (177 lb 12.8 oz)         General: Alert, interactive, more dyspneic appearing at rest but states that he doesn't feel worse  Resp: Predominant low pitched wheezing throughout, unchanged   Cardiac: RRR, NS1,S2, No m/r/g  Abdomen: Firm (but not rigid), though less so than yesterday, distended, tympanic to percussion with active bowel sounds  Extremities: Trace lower extremity edema  Skin: Warm and dry, no jaundice or rash  Neuro: Alert & oriented x 3, Cns 2-12 intact, moves all extremities equally      CT chest 12/21/2023  1.  Several small patchy opacities scattered within bilateral lower lobes and less so the right middle lobe, new since 12/11/2023. These likely represent pneumonia.  2.  Mild diffuse bronchial wall thickening, most likely infectious or inflammatory in etiology.  3.  Moderate to severe emphysematous changes in the lungs.    Chest x-ray 12/18  Increased consolidation in the right upper lobe since  comparison. Lungs otherwise clear.       Recent Labs   Lab Test 12/26/23  1615 12/25/23  1226 12/24/23  1128   WBC 4.6 5.9 5.5   RBC 4.28* 3.98* 3.83*   HGB 12.8* 11.8* 11.6*    137* 106*       Recent Labs   Lab Test 12/27/23  0228 12/26/23  2043 12/26/23  1709 12/26/23  1615 12/25/23  1711 12/25/23  1226 12/24/23  1256 12/24/23  1128   NA  --   --   --  142  --  142  --  142   POTASSIUM  --   --   --  4.0  --  3.7  --  3.6   CHLORIDE  --   --   --  100  --  100  --  101   CO2  --   --   --  34*  --  32*  --  31*   BUN  --   --   --  13.1  --  15.6  --  12.3   CR  --   --   --  0.84  --  0.85  --  0.75   * 104* 108* 130*   < > 105*   < > 96   KARLIE  --   --   --  9.2  " "--  8.7*  --  8.8   MAG  --   --   --  2.1  --  2.1  --  2.0    < > = values in this interval not displayed.           No results for input(s): \"CULT\" in the last 168 hours.      No results found for this or any previous visit (from the past 48 hour(s)).        "

## 2023-12-28 ENCOUNTER — APPOINTMENT (OUTPATIENT)
Dept: PHYSICAL THERAPY | Facility: CLINIC | Age: 78
DRG: 190 | End: 2023-12-28
Payer: MEDICARE

## 2023-12-28 LAB
ANION GAP SERPL CALCULATED.3IONS-SCNC: 7 MMOL/L (ref 7–15)
BUN SERPL-MCNC: 14 MG/DL (ref 8–23)
CALCIUM SERPL-MCNC: 8.8 MG/DL (ref 8.8–10.2)
CHLORIDE SERPL-SCNC: 104 MMOL/L (ref 98–107)
CREAT SERPL-MCNC: 0.95 MG/DL (ref 0.67–1.17)
DEPRECATED HCO3 PLAS-SCNC: 33 MMOL/L (ref 22–29)
EGFRCR SERPLBLD CKD-EPI 2021: 83 ML/MIN/1.73M2
ERYTHROCYTE [DISTWIDTH] IN BLOOD BY AUTOMATED COUNT: 14.3 % (ref 10–15)
GLUCOSE BLDC GLUCOMTR-MCNC: 122 MG/DL (ref 70–99)
GLUCOSE BLDC GLUCOMTR-MCNC: 166 MG/DL (ref 70–99)
GLUCOSE BLDC GLUCOMTR-MCNC: 235 MG/DL (ref 70–99)
GLUCOSE BLDC GLUCOMTR-MCNC: 98 MG/DL (ref 70–99)
GLUCOSE SERPL-MCNC: 101 MG/DL (ref 70–99)
HCT VFR BLD AUTO: 41.4 % (ref 40–53)
HGB BLD-MCNC: 13.2 G/DL (ref 13.3–17.7)
MCH RBC QN AUTO: 29.7 PG (ref 26.5–33)
MCHC RBC AUTO-ENTMCNC: 31.9 G/DL (ref 31.5–36.5)
MCV RBC AUTO: 93 FL (ref 78–100)
PLATELET # BLD AUTO: 215 10E3/UL (ref 150–450)
POTASSIUM SERPL-SCNC: 3.6 MMOL/L (ref 3.4–5.3)
RBC # BLD AUTO: 4.44 10E6/UL (ref 4.4–5.9)
SODIUM SERPL-SCNC: 144 MMOL/L (ref 135–145)
WBC # BLD AUTO: 7 10E3/UL (ref 4–11)

## 2023-12-28 PROCEDURE — 250N000012 HC RX MED GY IP 250 OP 636 PS 637: Performed by: STUDENT IN AN ORGANIZED HEALTH CARE EDUCATION/TRAINING PROGRAM

## 2023-12-28 PROCEDURE — 250N000013 HC RX MED GY IP 250 OP 250 PS 637: Performed by: HOSPITALIST

## 2023-12-28 PROCEDURE — 250N000011 HC RX IP 250 OP 636: Performed by: HOSPITALIST

## 2023-12-28 PROCEDURE — 120N000001 HC R&B MED SURG/OB

## 2023-12-28 PROCEDURE — 999N000157 HC STATISTIC RCP TIME EA 10 MIN

## 2023-12-28 PROCEDURE — 36415 COLL VENOUS BLD VENIPUNCTURE: CPT | Performed by: HOSPITALIST

## 2023-12-28 PROCEDURE — 250N000009 HC RX 250: Performed by: STUDENT IN AN ORGANIZED HEALTH CARE EDUCATION/TRAINING PROGRAM

## 2023-12-28 PROCEDURE — 250N000013 HC RX MED GY IP 250 OP 250 PS 637: Performed by: STUDENT IN AN ORGANIZED HEALTH CARE EDUCATION/TRAINING PROGRAM

## 2023-12-28 PROCEDURE — 99233 SBSQ HOSP IP/OBS HIGH 50: CPT | Performed by: HOSPITALIST

## 2023-12-28 PROCEDURE — 250N000013 HC RX MED GY IP 250 OP 250 PS 637: Performed by: INTERNAL MEDICINE

## 2023-12-28 PROCEDURE — 94640 AIRWAY INHALATION TREATMENT: CPT | Mod: 76

## 2023-12-28 PROCEDURE — 85027 COMPLETE CBC AUTOMATED: CPT | Performed by: HOSPITALIST

## 2023-12-28 PROCEDURE — 94640 AIRWAY INHALATION TREATMENT: CPT

## 2023-12-28 PROCEDURE — 99233 SBSQ HOSP IP/OBS HIGH 50: CPT | Performed by: STUDENT IN AN ORGANIZED HEALTH CARE EDUCATION/TRAINING PROGRAM

## 2023-12-28 PROCEDURE — 97116 GAIT TRAINING THERAPY: CPT | Mod: GP

## 2023-12-28 PROCEDURE — 80048 BASIC METABOLIC PNL TOTAL CA: CPT | Performed by: HOSPITALIST

## 2023-12-28 RX ORDER — AMOXICILLIN 250 MG
2 CAPSULE ORAL DAILY
Status: CANCELLED | COMMUNITY
Start: 2023-12-28

## 2023-12-28 RX ORDER — PREDNISONE 1 MG/1
2 TABLET ORAL DAILY
Status: CANCELLED | COMMUNITY
Start: 2023-12-28

## 2023-12-28 RX ORDER — FUROSEMIDE 40 MG
40 TABLET ORAL DAILY
Qty: 90 TABLET | Refills: 0 | Status: CANCELLED | OUTPATIENT
Start: 2023-12-29

## 2023-12-28 RX ORDER — PREDNISONE 10 MG/1
TABLET ORAL
Qty: 46 TABLET | Refills: 0 | Status: CANCELLED | OUTPATIENT
Start: 2023-12-29 | End: 2024-01-26

## 2023-12-28 RX ORDER — GABAPENTIN 400 MG/1
400 CAPSULE ORAL 3 TIMES DAILY
Qty: 270 CAPSULE | Refills: 0 | Status: CANCELLED | OUTPATIENT
Start: 2023-12-28

## 2023-12-28 RX ORDER — TAMSULOSIN HYDROCHLORIDE 0.4 MG/1
0.4 CAPSULE ORAL DAILY
Qty: 90 CAPSULE | Refills: 0 | Status: CANCELLED | OUTPATIENT
Start: 2023-12-29

## 2023-12-28 RX ORDER — PANTOPRAZOLE SODIUM 40 MG/1
40 TABLET, DELAYED RELEASE ORAL
Qty: 30 TABLET | Refills: 0 | Status: CANCELLED | OUTPATIENT
Start: 2023-12-29

## 2023-12-28 RX ORDER — POTASSIUM CHLORIDE 1500 MG/1
20 TABLET, EXTENDED RELEASE ORAL
Status: CANCELLED | COMMUNITY
Start: 2023-12-28

## 2023-12-28 RX ORDER — LIDOCAINE 4 G/G
2 PATCH TOPICAL EVERY 24 HOURS
Qty: 30 PATCH | Refills: 0 | Status: CANCELLED | OUTPATIENT
Start: 2023-12-28

## 2023-12-28 RX ADMIN — ACETYLCYSTEINE 2 ML: 200 SOLUTION ORAL; RESPIRATORY (INHALATION) at 16:21

## 2023-12-28 RX ADMIN — IPRATROPIUM BROMIDE AND ALBUTEROL SULFATE 3 ML: .5; 3 SOLUTION RESPIRATORY (INHALATION) at 11:22

## 2023-12-28 RX ADMIN — DULOXETINE HYDROCHLORIDE 20 MG: 20 CAPSULE, DELAYED RELEASE ORAL at 21:29

## 2023-12-28 RX ADMIN — INSULIN ASPART 2 UNITS: 100 INJECTION, SOLUTION INTRAVENOUS; SUBCUTANEOUS at 17:25

## 2023-12-28 RX ADMIN — IPRATROPIUM BROMIDE AND ALBUTEROL SULFATE 3 ML: .5; 3 SOLUTION RESPIRATORY (INHALATION) at 19:19

## 2023-12-28 RX ADMIN — AMLODIPINE BESYLATE 5 MG: 5 TABLET ORAL at 21:29

## 2023-12-28 RX ADMIN — GABAPENTIN 400 MG: 100 CAPSULE ORAL at 21:29

## 2023-12-28 RX ADMIN — AZITHROMYCIN DIHYDRATE 500 MG: 250 TABLET ORAL at 08:35

## 2023-12-28 RX ADMIN — TAMSULOSIN HYDROCHLORIDE 0.4 MG: 0.4 CAPSULE ORAL at 08:35

## 2023-12-28 RX ADMIN — ACETYLCYSTEINE 2 ML: 200 SOLUTION ORAL; RESPIRATORY (INHALATION) at 08:43

## 2023-12-28 RX ADMIN — PIPERACILLIN AND TAZOBACTAM 4.5 G: 4; .5 INJECTION, POWDER, FOR SOLUTION INTRAVENOUS at 01:39

## 2023-12-28 RX ADMIN — DULOXETINE HYDROCHLORIDE 20 MG: 20 CAPSULE, DELAYED RELEASE ORAL at 08:35

## 2023-12-28 RX ADMIN — LEVOTHYROXINE SODIUM 125 MCG: 125 TABLET ORAL at 08:35

## 2023-12-28 RX ADMIN — ENOXAPARIN SODIUM 40 MG: 40 INJECTION SUBCUTANEOUS at 13:17

## 2023-12-28 RX ADMIN — ASPIRIN 81 MG: 81 TABLET, COATED ORAL at 08:35

## 2023-12-28 RX ADMIN — ACETAMINOPHEN 1000 MG: 500 TABLET, FILM COATED ORAL at 13:17

## 2023-12-28 RX ADMIN — PANTOPRAZOLE SODIUM 40 MG: 40 TABLET, DELAYED RELEASE ORAL at 07:25

## 2023-12-28 RX ADMIN — LIDOCAINE 2 PATCH: 4 PATCH TOPICAL at 08:39

## 2023-12-28 RX ADMIN — ACETYLCYSTEINE 2 ML: 200 SOLUTION ORAL; RESPIRATORY (INHALATION) at 11:22

## 2023-12-28 RX ADMIN — PREDNISONE 30 MG: 20 TABLET ORAL at 08:35

## 2023-12-28 RX ADMIN — TRAZODONE HYDROCHLORIDE 50 MG: 50 TABLET ORAL at 22:49

## 2023-12-28 RX ADMIN — CYCLOBENZAPRINE 10 MG: 10 TABLET, FILM COATED ORAL at 22:49

## 2023-12-28 RX ADMIN — ACETAMINOPHEN 1000 MG: 500 TABLET, FILM COATED ORAL at 07:25

## 2023-12-28 RX ADMIN — ROSUVASTATIN CALCIUM 10 MG: 10 TABLET, FILM COATED ORAL at 21:29

## 2023-12-28 RX ADMIN — OXYCODONE HYDROCHLORIDE 5 MG: 5 TABLET ORAL at 22:49

## 2023-12-28 RX ADMIN — IPRATROPIUM BROMIDE AND ALBUTEROL SULFATE 3 ML: .5; 3 SOLUTION RESPIRATORY (INHALATION) at 16:21

## 2023-12-28 RX ADMIN — FUROSEMIDE 40 MG: 40 TABLET ORAL at 08:35

## 2023-12-28 RX ADMIN — GABAPENTIN 400 MG: 100 CAPSULE ORAL at 08:35

## 2023-12-28 RX ADMIN — METOPROLOL SUCCINATE 50 MG: 50 TABLET, EXTENDED RELEASE ORAL at 21:29

## 2023-12-28 RX ADMIN — PIPERACILLIN AND TAZOBACTAM 4.5 G: 4; .5 INJECTION, POWDER, FOR SOLUTION INTRAVENOUS at 07:26

## 2023-12-28 RX ADMIN — ASPIRIN 81 MG: 81 TABLET, COATED ORAL at 21:29

## 2023-12-28 RX ADMIN — GABAPENTIN 400 MG: 100 CAPSULE ORAL at 16:18

## 2023-12-28 RX ADMIN — ACETYLCYSTEINE 2 ML: 200 SOLUTION ORAL; RESPIRATORY (INHALATION) at 19:20

## 2023-12-28 RX ADMIN — IPRATROPIUM BROMIDE AND ALBUTEROL SULFATE 3 ML: .5; 3 SOLUTION RESPIRATORY (INHALATION) at 08:43

## 2023-12-28 RX ADMIN — ACETAMINOPHEN 1000 MG: 500 TABLET, FILM COATED ORAL at 21:29

## 2023-12-28 ASSESSMENT — ACTIVITIES OF DAILY LIVING (ADL)
ADLS_ACUITY_SCORE: 39
ADLS_ACUITY_SCORE: 36
ADLS_ACUITY_SCORE: 38
ADLS_ACUITY_SCORE: 39
ADLS_ACUITY_SCORE: 38
ADLS_ACUITY_SCORE: 38
ADLS_ACUITY_SCORE: 36
ADLS_ACUITY_SCORE: 38
ADLS_ACUITY_SCORE: 36

## 2023-12-28 NOTE — PLAN OF CARE
Goal Outcome Evaluation:         Cognitive Concerns/ Orientation: A&Ox4. Forgetful at times. Calm and cooperative.   BEHAVIOR & AGGRESSION TOOL COLOR: Green  ABNL VS/O2: VSS on 2-3L O2 via NC. Significant ACEVES with minimal activity. Sats in the 80's with activity. LS mildly coarse with exp wheezing. Audible at times. On Scheduled Nebs.   MOBILITY: Independent with O2 and personal walker. Using bathroom.   PAIN MANAGMENT: Chronic back pain, declined interventions   DIET: Mod Carb diet  BOWEL/BLADDER: Continent  ABNORMAL LABS: BG 98  DRAIN/DEVICES: New Peripheral IV SL right AC placed by anesthesia   TELEMETRY RHYTHM: NA  SKIN: Scattered bruising and scabs - jose j BLE. Small dime-sized discolored spot to middle of chest.   TESTS/PROCEDURES: AM labs  D/C DAY/GOALS/PLACE: Home once off IV antibiotics  OTHER IMPORTANT INFO: On scheduled nebs, IV abx. Pulmonology and RT following. Pt encouraged to call as needed.

## 2023-12-28 NOTE — PROGRESS NOTES
[facility]    Medicine Progress Note - Hospitalist Service    Date of Admission:  12/6/2023    Assessment & Plan   Harrison Thomas is a 78 year old male admitted on 12/6/2023. past medical history significant for chronic hypoxic respiratory failure [has oxygen as needed at home], COPD, RUL lung mass s/p radiation, untreated BRENNEN, HTN, HLD, CAD, hypothyroidism admitted on 12/6/2023 with acute on chronic hypoxic respiratory failure likely 2/2 COPD exacerbation.      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 Handy with pulmonology at [facility], last seen in September 2023.  On low dose chronic prednisone (2mg).  *Patient presented to the ED with increased shortness of breath over the past week, increased oxygen use at home.  *Admission clinical picture consistent with COPD exacerbation - poor air movement with diffuse expiratory wheezing - placed on steroids and nebs.  *BNP and procalcitonin wnl, COVID/flu/RSV negative on admission, resp viral panel negative (12/15).  *Admission CXR showed just faint residual opacities in the RUL corresponding to RUL mass and post-treatment changes.  *CT pulm angio 12/11 negative for PE, shows mild bronchial wall thickening and extensive emphysema.  *Underwent sniff test this admission that was negative.  *Received 5-day treatment course azithromycin at admission, resumed  mg every other day dosing for prophylaxis.  *Significantly increased oxygen needs 12/20 necessitating HFNC and Bipap.  *Pulm consulted and following.  *Transitioned from IV solumedrol to prednisone 12/20.  - Continue PTA azithromycin 500 mg every other day.  - Continue prednisone, taper as ordered by pulmonology in [facility], currently on 30 mg daily as of 12/28/23.  - Continue pantoprazole until tapered back to PTA dose of  prednisone.  - Duonebs qid.  - Home inhaler on hold.  - Encourage IS and flutter valve.  - Repeat procal 12/21 up to 3.04 (from 0.06 on 12/6) and CT chest 12/21 showing new small patchy opacities of bilateral lower lobes and RML, mild bronchial thickening.  - Completed 7 day course of Zosyn on 12/28/23.  - MRSA nasal swab negative on 12/21/23.  - Respiratory culture obtained 12/22/23, grew 1+ Candida albicans.  - Procalcitonin improved to 1.32 on 12/23/23 and then 0.25 on 12/26/23.  - RRT on 12/23/23 due to increased shortness of breath and hypoxia. Improved with IV lasix and BiPAP. ? related to pulmonary edema/volume overload.  - Gave another dose of IV lasix on 12/24/23 and increased daily oral lasix to 40 mg daily on 12/25/23. Plan to discharge on lasix 40 mg daily (which is double his PTA dose of 20 mg daily). Will need close outpatient follow-up, possible further titration of diuretic dosing.  - Was on BiPAP on 12/23/23, then weaned back to nasal cannula, currently at 2 lpm on 12/28/23, continue to wean as tolerated, suspect he will need to go home with continuous supplemental oxygen.  - Will need ambulatory oximetry prior to discharge to qualify for home oxygen.  - Follow up with sleep medicine at discharge for review of BRENNEN and qualification of CPAP.  - Pulm recommendations much appreciated.  - Discussed overall goals of care with patient on 12/26/23. Overall symptoms improved over the next few days and he is planning to continue with restorative cares at this time.  - Potential discharge home as early as 12/29/23 pending continued clinical improvement.     Hypertension   Hyperlipidemia  Coronary artery disease  - Continue PTA metoprolol, amlodipine, aspirin, rosuvastatin and lasix (dose increased as noted above).     Chronic low back pain   *Patient is supposed to be getting a lumbar MRI soon.  Can be done as an outpatient.  - Continue PTA oxycodone.  - Gabapentin changed from 600 mg bid to 400 mg tid,  continue.  - Hold naproxen for now in the setting of high-dose steroids since he would be at high risk for upper GI bleed and ulcers.  - Oral PPI for prophylaxis.  - Continue PRN Flexeril.  - Scheduled Tylenol tid.  - Lidocaine patches.    Constipation, resolved  *Given concern for abdominal distention, obtained abdominal x-ray on 12/18 that still showed large amount of stool throughout the colon.  Started having bowel movement on 12/19/23 and has been having at least daily bowel movements since then.  - Continue on aggressive bowel regimen.    Urinary retention  *Required straight cath x3 12/20-12/21 and martinez placed.  - Started flomax 12/21/23.  - Successful trial of voiding on 12/23/23.   - Continue to monitor for retention following catheter removal.  - Aggressively treat constipation as above.     Hx of papillary thyroid cancer s/p thyroidectomy   Hypothyroidism   - Continue PTA levothyroxine.     Insomnia   - Continue PTA Trazodone.     Leukocytosis  - Related to steroids and pneumonia.  - WBC back to within normal limits as of 12/23/23.     Thrombocytopenia, resolved  Platelets in low 100s.  Not on any heparin products on admission through 12/20.  He is on baby aspirin twice daily, no signs of bleeding.  - Platelets 134 on 12/16/23, slowly trended down.  - Platelets 96 on 12/23/23, improved to within normal limits as of 12/26/23.  - Lovenox prophylaxis started 12/21/23 as platelets have trended up and are back to normal.    Bilateral pulmonary nodules  CT chest 12/11 showing scattered small bilateral pulmonary nodules measuring up to 5 mm are unchanged.  - Continued surveillance recommended.          Diet: Regular Diet Adult    DVT Prophylaxis: Enoxaparin (Lovenox) SQ  Martinez Catheter: Not present  Lines: None     Cardiac Monitoring: None  Code Status: No CPR- Do NOT Intubate      Clinically Significant Risk Factors              # Hypoalbuminemia: Lowest albumin = 3.4 g/dL at 12/8/2023 11:58 AM, will monitor  "as appropriate     # Hypertension: Noted on problem list        # Overweight: Estimated body mass index is 25.51 kg/m  as calculated from the following:    Height as of this encounter: 1.778 m (5' 10\").    Weight as of this encounter: 80.6 kg (177 lb 12.8 oz).             Disposition Plan      Expected Discharge Date: 12/29/2023, 12:00 PM      Discharge Comments: pt is on iv solumedrol            Tino Zabala MD  Hospitalist Service  [facility]  Securely message with [employer] (more info)  Text page via [facility] Paging/Directory   ______________________________________________________________________    Interval History   Harrison Thomas was seen today. He feels a little better today. Shortness of breath is improving. Denies fevers, chest pain, nausea, abdominal pain. Chronic low back pain. Was able to walk about 125 feet x 2 with therapy this morning. Plan of care discussed with his daughter, Janey, by phone today. Discussed plan of care with pulmonology and care management today.    Physical Exam   Vital Signs: Temp: 98.2  F (36.8  C) Temp src: Oral BP: (!) 156/83 Pulse: 60   Resp: 28 SpO2: 90 % O2 Device: Nasal cannula Oxygen Delivery: 2 LPM  Weight: 177 lbs 12.8 oz    Constitutional: awake, alert, cooperative, no apparent distress, laying in the hospital bed with the head of the bed  Respiratory: no increased work of breathing, few wheezes  Cardiovascular: regular rate and rhythm, normal S1 and S2, no murmur noted  GI: normal bowel sounds, soft, non-distended, non-tender  Skin: warm, dry  Musculoskeletal: no lower extremity pitting edema present  Neurologic: awake, alert, oriented, answers questions appropriately, moves all extremities    Medical Decision Making       50 MINUTES SPENT BY ME on the date of service doing chart review, history, exam, documentation & further activities per the note.      Data     I have personally reviewed the following data over the past 24 hrs:    7.0  " \   13.2 (L)   / 215     144 104 14.0 /  122 (H)   3.6 33 (H) 0.95 \

## 2023-12-28 NOTE — PROGRESS NOTES
[facility]    Pulmonary, Allergy, Critical Care and Sleep Medicine    Pulmonary Consult Progress Note    Harrison Thomas MRN# [id number]   Age: 78 year old YOB: 1945     Date of Admission: 12/6/2023  Date of Service: 12/28/2023     ==================================================  INTERVAL EVENTS:  Down to 2 L nasal cannula satting 96%  Breathing improved from yesterday per patient  He thinks the Mucomyst is helping  Afebrile  Nursing notes reviewed  Still with dyspnea with minimal exertion  Zosyn since 12/21  -600cc net negative      CHANGES FOR TODAY:  -Continue prednisone , taper ordered  -Continue antibiotics  -continue Mucomyst + DuoNebs  -Daily weights  -May need additional diuresis    ==================================================    ASSESSMENT AND RECOMMENDATIONS:    Harrison Thomas is a 78 year old male with a history of Moderate COPD, 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     ## Acute Hypoxic Respiratory Failure  ## Moderate COPD with Exacerbation  ## HAP  ## Right upper lobe lung mass status post radiation treatment with possible chronic radiation changes versus radiation pneumonitis  ## Seropositive RA (previously on methotrexate and prednisone, now off)  ## Moderate sleep apnea (AHI 20 in 2018)  ## Former smoker    History of right upper lobe mass without clear etiology after bronchoscopy and VATS biopsy.  Underwent empiric radiation therapy in mid 2022.  Outpatient pulmonologist Dr. Handy at the [facility].    Admitted following increased home use of oxygen and rescue therapies. Initially required 5L but has weaned down to minimal to no oxygen use.  BNP and procalcitonin negative on admission.  Chest imaging largely stable from prior with some bronchial wall  thickening and chronic right upper lobe consolidation which was initially stable, but then increased on 12/18.  Repeat CT 12/21 with dense scattered bilateral infiltrates new from CT on 12/11.  Oxygen requirements worsened to the point that he is requiring high flow nasal cannula, then was able to decrease down to regular nasal cannula on 12/22.      Persistent severe wheezing with dependence on steroids, scheduled bronchodilators, mucolytic and flutter valve.  Respiratory viral panel negative.  Sniff test within normal limits. Abdominal distention likely impacting breathing as well given low reserve    12/27/2023: Added back Mucomyst given his clinical improvement has appeared to stall.  May need to increase diuresis as well if not improving      -Echo (12/24): Normal EF  -Follow-up with sleep medicine after discharge  -Duonebs   -Mucomyst QID  added back 12/27/2023 (do with DuoNebs)  -Flutter valve following nebs QID  -Agree with current steroids, drop by 10mg every 7 days   -Decrease to 30 mg prednisone (12/27)  -Steroid taper ordered  -follow up with pulmonary in one month ([place]  -Agree with antibiotics for HAP, procal improved  -continue diuresis as tolerated based on kidney function (getting 40 mg of Lasix a day)  --> may need to increase dose given persistent bronchial breath sounds      I have personally reviewed the daily labs, imaging studies, cultures and discussed the case with referring physician and consulting physicians.     I personally spent a total of 55  minutes (excluding procedures) including chart review, patient care, and documentation on 12/28/2023    Talib Beth MD  Pulmonary & Critical Care   Department of Medicine  Division of Pulmonary, Allergy, Critical Care and Sleep Medicine   [facility], [facility]  [phone] (Text Page)       ==================================================      PHYSICAL EXAM  BP (!) 149/90 (BP Location: Right arm, Patient  "Position: Semi-Rodriguez's, Cuff Size: Adult Regular)   Pulse 74   Temp 98.2  F (36.8  C) (Oral)   Resp 18   Ht 1.778 m (5' 10\")   Wt 80.6 kg (177 lb 12.8 oz)   SpO2 92%   BMI 25.51 kg/m      In: 100 [I.V.:100]  Out: 700 [Urine:700]      Vitals:    12/18/23 0900 12/19/23 1700 12/25/23 1120   Weight: 83.2 kg (183 lb 6.4 oz) 81.3 kg (179 lb 3.2 oz) 80.6 kg (177 lb 12.8 oz)         General: Alert, interactive, more dyspneic appearing at rest but states that he doesn't feel worse  Resp: Predominant low pitched wheezing throughout  Cardiac: RRR, NS1,S2, No m/r/g  Abdomen: active bowel sounds  Extremities: Trace lower extremity edema  Skin: Warm and dry, no jaundice or rash  Neuro: Alert & oriented x 3, Cns 2-12 intact, moves all extremities equally      CT chest 12/21/2023  1.  Several small patchy opacities scattered within bilateral lower lobes and less so the right middle lobe, new since 12/11/2023. These likely represent pneumonia.  2.  Mild diffuse bronchial wall thickening, most likely infectious or inflammatory in etiology.  3.  Moderate to severe emphysematous changes in the lungs.    Chest x-ray 12/18  Increased consolidation in the right upper lobe since  comparison. Lungs otherwise clear.       Recent Labs   Lab Test 12/26/23  1615 12/25/23  1226 12/24/23  1128   WBC 4.6 5.9 5.5   RBC 4.28* 3.98* 3.83*   HGB 12.8* 11.8* 11.6*    137* 106*       Recent Labs   Lab Test 12/28/23  0203 12/27/23  2058 12/27/23  1747 12/26/23  1709 12/26/23  1615 12/25/23  1711 12/25/23  1226 12/24/23  1256 12/24/23  1128   NA  --   --   --   --  142  --  142  --  142   POTASSIUM  --   --   --   --  4.0  --  3.7  --  3.6   CHLORIDE  --   --   --   --  100  --  100  --  101   CO2  --   --   --   --  34*  --  32*  --  31*   BUN  --   --   --   --  13.1  --  15.6  --  12.3   CR  --   --   --   --  0.84  --  0.85  --  0.75   GLC 98 132* 90   < > 130*   < > 105*   < > 96   KARLIE  --   --   --   --  9.2  --  8.7*  --  8.8   MAG  " "--   --   --   --  2.1  --  2.1  --  2.0    < > = values in this interval not displayed.           No results for input(s): \"CULT\" in the last 168 hours.      No results found for this or any previous visit (from the past 48 hour(s)).        "

## 2023-12-28 NOTE — PROGRESS NOTES
Patient is AO X 4; VSS; on 2 LPM supplemental oxygen, chronic.  He is independent; in no acute distress, wheezing on auscultation.

## 2023-12-28 NOTE — PLAN OF CARE
Summary: COPD exacerbation  DATE & TIME: 12/27/2023 Evenings   Cognitive Concerns/ Orientation: A&Ox4. Forgetful at times. Calm and cooperative.   BEHAVIOR & AGGRESSION TOOL COLOR: Green  ABNL VS/O2: VSS on 2-3L O2 via NC. Significant ACEVES with minimal activity. Sats in the 80's with activity. LS mildly coarse with exp wheezing. Audible at times. On Scheduled Nebs.   MOBILITY: Independent with O2 and personal walker. Using bathroom. Encouraged chair more freq.   PAIN MANAGMENT: Chronic back pain- Scheduled Tylenol given; 2 lidocaine patches on back removed this evening. PRN Oxy and methocarbamol at bedtime.    DIET: Mod Carb diet. Good appetite and fluid intake.   BOWEL/BLADDER: Continent- using bathroom and had large urgent incontinence of BM this shift. Refused Miralax and senna this evening.   ABNORMAL LABS: BGL 90, 132  DRAIN/DEVICES: New PIV after previous on leaked and pt accidentally removed IV when ambulating to bathroom. SL with intermittent Zosyn  TELEMETRY RHYTHM: NA  SKIN: Scattered bruising and scabs - jose j BLE. Small dime-sized discolored spot to middle of chest.   TESTS/PROCEDURES: AM labs  D/C DAY/GOALS/PLACE: Home once off IV antibiotics  OTHER IMPORTANT INFO: On scheduled nebs, IV abx. Pulmonology and RT following. Pt encouraged to call as needed.

## 2023-12-29 ENCOUNTER — APPOINTMENT (OUTPATIENT)
Dept: PHYSICAL THERAPY | Facility: CLINIC | Age: 78
DRG: 190 | End: 2023-12-29
Payer: MEDICARE

## 2023-12-29 LAB
GLUCOSE BLDC GLUCOMTR-MCNC: 134 MG/DL (ref 70–99)
GLUCOSE BLDC GLUCOMTR-MCNC: 148 MG/DL (ref 70–99)
GLUCOSE BLDC GLUCOMTR-MCNC: 149 MG/DL (ref 70–99)
GLUCOSE BLDC GLUCOMTR-MCNC: 192 MG/DL (ref 70–99)
GLUCOSE BLDC GLUCOMTR-MCNC: 94 MG/DL (ref 70–99)
PLATELET # BLD AUTO: 238 10E3/UL (ref 150–450)

## 2023-12-29 PROCEDURE — 250N000013 HC RX MED GY IP 250 OP 250 PS 637: Performed by: HOSPITALIST

## 2023-12-29 PROCEDURE — 94640 AIRWAY INHALATION TREATMENT: CPT | Mod: 76

## 2023-12-29 PROCEDURE — 120N000001 HC R&B MED SURG/OB

## 2023-12-29 PROCEDURE — 250N000011 HC RX IP 250 OP 636: Performed by: INTERNAL MEDICINE

## 2023-12-29 PROCEDURE — 999N000157 HC STATISTIC RCP TIME EA 10 MIN

## 2023-12-29 PROCEDURE — 250N000009 HC RX 250: Performed by: STUDENT IN AN ORGANIZED HEALTH CARE EDUCATION/TRAINING PROGRAM

## 2023-12-29 PROCEDURE — 85049 AUTOMATED PLATELET COUNT: CPT | Performed by: STUDENT IN AN ORGANIZED HEALTH CARE EDUCATION/TRAINING PROGRAM

## 2023-12-29 PROCEDURE — 250N000013 HC RX MED GY IP 250 OP 250 PS 637: Performed by: INTERNAL MEDICINE

## 2023-12-29 PROCEDURE — 250N000011 HC RX IP 250 OP 636: Performed by: HOSPITALIST

## 2023-12-29 PROCEDURE — 250N000013 HC RX MED GY IP 250 OP 250 PS 637: Performed by: STUDENT IN AN ORGANIZED HEALTH CARE EDUCATION/TRAINING PROGRAM

## 2023-12-29 PROCEDURE — 36415 COLL VENOUS BLD VENIPUNCTURE: CPT | Performed by: STUDENT IN AN ORGANIZED HEALTH CARE EDUCATION/TRAINING PROGRAM

## 2023-12-29 PROCEDURE — 97116 GAIT TRAINING THERAPY: CPT | Mod: GP

## 2023-12-29 PROCEDURE — 250N000012 HC RX MED GY IP 250 OP 636 PS 637: Performed by: STUDENT IN AN ORGANIZED HEALTH CARE EDUCATION/TRAINING PROGRAM

## 2023-12-29 PROCEDURE — 99233 SBSQ HOSP IP/OBS HIGH 50: CPT | Performed by: STUDENT IN AN ORGANIZED HEALTH CARE EDUCATION/TRAINING PROGRAM

## 2023-12-29 PROCEDURE — 97530 THERAPEUTIC ACTIVITIES: CPT | Mod: GP

## 2023-12-29 PROCEDURE — 99233 SBSQ HOSP IP/OBS HIGH 50: CPT | Performed by: INTERNAL MEDICINE

## 2023-12-29 PROCEDURE — 94640 AIRWAY INHALATION TREATMENT: CPT

## 2023-12-29 PROCEDURE — 250N000009 HC RX 250: Performed by: HOSPITALIST

## 2023-12-29 RX ORDER — FUROSEMIDE 10 MG/ML
20 INJECTION INTRAMUSCULAR; INTRAVENOUS
Status: DISCONTINUED | OUTPATIENT
Start: 2023-12-29 | End: 2023-12-29

## 2023-12-29 RX ORDER — PIPERACILLIN SODIUM, TAZOBACTAM SODIUM 3; .375 G/15ML; G/15ML
3.38 INJECTION, POWDER, LYOPHILIZED, FOR SOLUTION INTRAVENOUS EVERY 6 HOURS
Status: COMPLETED | OUTPATIENT
Start: 2023-12-29 | End: 2024-01-01

## 2023-12-29 RX ORDER — FUROSEMIDE 10 MG/ML
40 INJECTION INTRAMUSCULAR; INTRAVENOUS
Status: COMPLETED | OUTPATIENT
Start: 2023-12-29 | End: 2023-12-29

## 2023-12-29 RX ADMIN — METOPROLOL SUCCINATE 50 MG: 50 TABLET, EXTENDED RELEASE ORAL at 22:46

## 2023-12-29 RX ADMIN — ACETYLCYSTEINE 2 ML: 200 SOLUTION ORAL; RESPIRATORY (INHALATION) at 23:31

## 2023-12-29 RX ADMIN — PANTOPRAZOLE SODIUM 40 MG: 40 TABLET, DELAYED RELEASE ORAL at 05:30

## 2023-12-29 RX ADMIN — ACETYLCYSTEINE 2 ML: 200 SOLUTION ORAL; RESPIRATORY (INHALATION) at 15:21

## 2023-12-29 RX ADMIN — GABAPENTIN 400 MG: 100 CAPSULE ORAL at 08:05

## 2023-12-29 RX ADMIN — ACETYLCYSTEINE 2 ML: 200 SOLUTION ORAL; RESPIRATORY (INHALATION) at 02:00

## 2023-12-29 RX ADMIN — ACETYLCYSTEINE 2 ML: 200 SOLUTION ORAL; RESPIRATORY (INHALATION) at 11:12

## 2023-12-29 RX ADMIN — ASPIRIN 81 MG: 81 TABLET, COATED ORAL at 22:46

## 2023-12-29 RX ADMIN — INSULIN ASPART 1 UNITS: 100 INJECTION, SOLUTION INTRAVENOUS; SUBCUTANEOUS at 17:53

## 2023-12-29 RX ADMIN — GABAPENTIN 400 MG: 100 CAPSULE ORAL at 22:46

## 2023-12-29 RX ADMIN — FUROSEMIDE 40 MG: 10 INJECTION, SOLUTION INTRAMUSCULAR; INTRAVENOUS at 11:21

## 2023-12-29 RX ADMIN — IPRATROPIUM BROMIDE AND ALBUTEROL SULFATE 3 ML: .5; 3 SOLUTION RESPIRATORY (INHALATION) at 15:21

## 2023-12-29 RX ADMIN — OXYCODONE HYDROCHLORIDE 5 MG: 5 TABLET ORAL at 08:13

## 2023-12-29 RX ADMIN — AMLODIPINE BESYLATE 5 MG: 5 TABLET ORAL at 22:46

## 2023-12-29 RX ADMIN — IPRATROPIUM BROMIDE AND ALBUTEROL SULFATE 3 ML: .5; 3 SOLUTION RESPIRATORY (INHALATION) at 23:30

## 2023-12-29 RX ADMIN — DULOXETINE HYDROCHLORIDE 20 MG: 20 CAPSULE, DELAYED RELEASE ORAL at 08:06

## 2023-12-29 RX ADMIN — IPRATROPIUM BROMIDE AND ALBUTEROL SULFATE 3 ML: .5; 3 SOLUTION RESPIRATORY (INHALATION) at 01:59

## 2023-12-29 RX ADMIN — GABAPENTIN 400 MG: 100 CAPSULE ORAL at 17:52

## 2023-12-29 RX ADMIN — FUROSEMIDE 40 MG: 10 INJECTION, SOLUTION INTRAMUSCULAR; INTRAVENOUS at 17:55

## 2023-12-29 RX ADMIN — ACETAMINOPHEN 1000 MG: 500 TABLET, FILM COATED ORAL at 22:46

## 2023-12-29 RX ADMIN — DULOXETINE HYDROCHLORIDE 20 MG: 20 CAPSULE, DELAYED RELEASE ORAL at 22:46

## 2023-12-29 RX ADMIN — CYCLOBENZAPRINE 10 MG: 10 TABLET, FILM COATED ORAL at 08:13

## 2023-12-29 RX ADMIN — ASPIRIN 81 MG: 81 TABLET, COATED ORAL at 08:05

## 2023-12-29 RX ADMIN — ACETAMINOPHEN 1000 MG: 500 TABLET, FILM COATED ORAL at 05:26

## 2023-12-29 RX ADMIN — OXYCODONE HYDROCHLORIDE 5 MG: 5 TABLET ORAL at 22:57

## 2023-12-29 RX ADMIN — IPRATROPIUM BROMIDE AND ALBUTEROL SULFATE 3 ML: .5; 3 SOLUTION RESPIRATORY (INHALATION) at 19:01

## 2023-12-29 RX ADMIN — ACETYLCYSTEINE 2 ML: 200 SOLUTION ORAL; RESPIRATORY (INHALATION) at 07:24

## 2023-12-29 RX ADMIN — ACETYLCYSTEINE 2 ML: 200 SOLUTION ORAL; RESPIRATORY (INHALATION) at 19:01

## 2023-12-29 RX ADMIN — INSULIN ASPART 2 UNITS: 100 INJECTION, SOLUTION INTRAVENOUS; SUBCUTANEOUS at 13:02

## 2023-12-29 RX ADMIN — IPRATROPIUM BROMIDE AND ALBUTEROL SULFATE 3 ML: .5; 3 SOLUTION RESPIRATORY (INHALATION) at 11:12

## 2023-12-29 RX ADMIN — ENOXAPARIN SODIUM 40 MG: 40 INJECTION SUBCUTANEOUS at 11:22

## 2023-12-29 RX ADMIN — ACETAMINOPHEN 1000 MG: 500 TABLET, FILM COATED ORAL at 13:02

## 2023-12-29 RX ADMIN — PREDNISONE 30 MG: 20 TABLET ORAL at 08:05

## 2023-12-29 RX ADMIN — CYCLOBENZAPRINE 10 MG: 10 TABLET, FILM COATED ORAL at 22:57

## 2023-12-29 RX ADMIN — ROSUVASTATIN CALCIUM 10 MG: 10 TABLET, FILM COATED ORAL at 22:46

## 2023-12-29 RX ADMIN — PIPERACILLIN AND TAZOBACTAM 3.38 G: 3; .375 INJECTION, POWDER, FOR SOLUTION INTRAVENOUS at 11:22

## 2023-12-29 RX ADMIN — LEVOTHYROXINE SODIUM 125 MCG: 125 TABLET ORAL at 08:05

## 2023-12-29 RX ADMIN — PIPERACILLIN AND TAZOBACTAM 3.38 G: 3; .375 INJECTION, POWDER, FOR SOLUTION INTRAVENOUS at 18:01

## 2023-12-29 RX ADMIN — FUROSEMIDE 40 MG: 40 TABLET ORAL at 08:06

## 2023-12-29 RX ADMIN — TAMSULOSIN HYDROCHLORIDE 0.4 MG: 0.4 CAPSULE ORAL at 08:05

## 2023-12-29 RX ADMIN — IPRATROPIUM BROMIDE AND ALBUTEROL SULFATE 3 ML: .5; 3 SOLUTION RESPIRATORY (INHALATION) at 07:23

## 2023-12-29 ASSESSMENT — ACTIVITIES OF DAILY LIVING (ADL)
ADLS_ACUITY_SCORE: 36

## 2023-12-29 NOTE — PLAN OF CARE
Goal Outcome Evaluation:                      Summary: COPD exacerbation  DATE & TIME: 12/28/2023, [id number]  Cognitive Concerns/ Orientation: A&Ox4. Forgetful at times. Calm and cooperative.   BEHAVIOR & AGGRESSION TOOL COLOR: Green  ABNL VS/O2: VSS on 2L O2 via NC. LS  with exp wheezing. Audible at times. On Scheduled Nebs. ACEVES  MOBILITY: Independent with O2 and personal walker. Using bathroom.   PAIN MANAGMENT: Denies  DIET: Mod Carb diet  BOWEL/BLADDER: Continent  ABNORMAL LABS: , Hgb 13.2  DRAIN/DEVICES: New Peripheral IV SL right AC placed by anesthesia   TELEMETRY RHYTHM: NA  SKIN: Scattered bruising and scabs - jose j BLE. Dressing on right knee CDI  TESTS/PROCEDURES: Home Oxygen assessment to be done 12/29/23 at 8am.  D/C DAY/GOALS/PLACE: discharge home possible tomorrow.  OTHER IMPORTANT INFO: On scheduled nebs, IV abx. Pulmonology and RT following. On po Zithromax.

## 2023-12-29 NOTE — PLAN OF CARE
Goal Outcome Evaluation:    DATE & TIME: 12/29/23  night shift   Cognitive Concerns/ Orientation : A/Ox4, forgetful   BEHAVIOR & AGGRESSION TOOL COLOR: Green   ABNL VS/O2: VSS on 2L NC. ACEVES with expiratory wheezes. On scheduled nebs  MOBILITY: Independent with walker and oxygen  PAIN MANAGMENT: low back pain managed with PRN Oxycodone, and scheduled Tylenol  DIET: Regular  BOWEL/BLADDER: Continent  ABNL LAB/BG:   DRAIN/DEVICES: PIV SL  SKIN: Scattered bruising and scabs, jose j bilateral lower extremities  TESTS/PROCEDURES: Needs Home Oxygen assessment in AM for d/c  D/C DATE: possibly today  OTHER IMPORTANT INFO: Pulmonology/RT following.  On PO ABX, prednisone, lasix

## 2023-12-29 NOTE — PLAN OF CARE
DATE & TIME: 12/28/23 1900-2300    Cognitive Concerns/ Orientation : Pt A/Ox4, forgetful   BEHAVIOR & AGGRESSION TOOL COLOR: Green   ABNL VS/O2: VSS on 2L NC  MOBILITY: Independent with walker and oxygen  PAIN MANAGMENT: Complaints of low back pain, managed with PRN Flexeril and Oxycodone at bedtime  DIET: Regular  BOWEL/BLADDER: Continent  ABNL LAB/BG:   DRAIN/DEVICES: IV SL  SKIN: Scattered bruising and scabs, jose j bilateral lower extremities  TESTS/PROCEDURES: Home Oxygen assessment in AM  D/C DATE: Discharge possibly tomorrow 12/29  OTHER IMPORTANT INFO: Lung sounds diminished with expiratory wheezes. Pulmonology following.

## 2023-12-29 NOTE — PROGRESS NOTES
[facility]    Pulmonary, Allergy, Critical Care and Sleep Medicine    Pulmonary Consult Progress Note    Harrison Thomas MRN# [id number]   Age: 78 year old YOB: 1945     Date of Admission: 12/6/2023  Date of Service: 12/29/2023     ==================================================  INTERVAL EVENTS:  Down to 2 L nasal cannula satting 91%  Breathing may be worse from yesterday per patient  Afebrile  Nursing notes reviewed  Able to walk around more and more each day    CHANGES TODAY:  -Patient not doing as well as yesterday, not ready for discharge  -Increased diuresis, give diuretic dose in the afternoon as well  -If still not improving would add MetaNebs tomorrow  -Continue Zosyn for 10-day course    ==================================================    ASSESSMENT AND RECOMMENDATIONS:    Harrison Thomas is a 78 year old male with a history of Moderate COPD, 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     ## Acute Hypoxic Respiratory Failure  ## Moderate COPD with Exacerbation  ## HAP  ## Right upper lobe lung mass status post radiation treatment with possible chronic radiation changes versus radiation pneumonitis  ## Seropositive RA (previously on methotrexate and prednisone, now off)  ## Moderate sleep apnea (AHI 20 in 2018)  ## Former smoker    History of right upper lobe mass without clear etiology after bronchoscopy and VATS biopsy.  Underwent empiric radiation therapy in mid 2022.  Outpatient pulmonologist Dr. Handy at the [facility].    Admitted following increased home use of oxygen and rescue therapies. Initially required 5L but has weaned down to minimal to no oxygen use.  BNP and procalcitonin negative on admission.  Chest imaging largely stable from prior with some bronchial  wall thickening and chronic right upper lobe consolidation which was initially stable, but then increased on 12/18.  Repeat CT 12/21 with dense scattered bilateral infiltrates new from CT on 12/11.  Oxygen requirements worsened to the point that he is requiring high flow nasal cannula, then was able to decrease down to regular nasal cannula on 12/22.      Persistent severe wheezing with dependence on steroids, scheduled bronchodilators, mucolytic and flutter valve.  Respiratory viral panel negative.  Sniff test within normal limits. Abdominal distention likely impacting breathing as well given low reserve    12/27/2023: Added back Mucomyst given his clinical improvement has appeared to stall.  May need to increase diuresis as well if not improving    12/29/2023: Improvements have stalled, not ready for discharge, will increase diuresis and potentially add MetaNebs over the weekend.    -Echo (12/24): Normal EF  -Follow-up with sleep medicine after discharge  -Duonebs QID  -Mucomyst QID  added back 12/27/2023 (do with DuoNebs)  -Flutter valve following nebs QID  -Agree with current steroids, drop by 10mg every 7 days   -Decrease to 30 mg prednisone (12/27)  -Steroid taper ordered  -follow up with pulmonary in one month (sees Dr. Handy at [facility])  -Agree with antibiotics for HAP, procal improved. Complete 10 days of Zosyn  -Increase diuretics (12/29), given afternoon dose of Lasix  -If not improving would add MetaNebs for better airway clearance over the weekend    I have personally reviewed the daily labs, imaging studies, cultures and discussed the case with referring physician and consulting physicians.     Personally discussed with hospitalist    I personally spent a total of 55  minutes (excluding procedures) including chart review, patient care, and documentation on 12/29/2023    Talib Beth MD  Pulmonary & Critical Care   Department of Medicine  Division of Pulmonary, Allergy, Critical Care and Sleep  "Medicine   [facility], [facility]  [phone] (Text Page)       ==================================================      PHYSICAL EXAM  /82 (BP Location: Left arm)   Pulse 72   Temp 98.5  F (36.9  C) (Oral)   Resp 18   Ht 1.778 m (5' 10\")   Wt 79.3 kg (174 lb 12.8 oz)   SpO2 91%   BMI 25.08 kg/m      In: 100 [P.O.:100]  Out: 1875 [Urine:1875]      Vitals:    12/19/23 1700 12/25/23 1120 12/29/23 0617   Weight: 81.3 kg (179 lb 3.2 oz) 80.6 kg (177 lb 12.8 oz) 79.3 kg (174 lb 12.8 oz)         General: Alert, interactive, more dyspneic appearing at rest but states that he doesn't feel worse  Resp: Predominant low pitched wheezing throughout  Cardiac: RRR, NS1,S2, No m/r/g  Abdomen: active bowel sounds  Extremities: Trace lower extremity edema  Skin: Warm and dry, no jaundice or rash  Neuro: Alert & oriented x 3, Cns 2-12 intact, moves all extremities equally      CT chest 12/21/2023  1.  Several small patchy opacities scattered within bilateral lower lobes and less so the right middle lobe, new since 12/11/2023. These likely represent pneumonia.  2.  Mild diffuse bronchial wall thickening, most likely infectious or inflammatory in etiology.  3.  Moderate to severe emphysematous changes in the lungs.    Chest x-ray 12/18  Increased consolidation in the right upper lobe since  comparison. Lungs otherwise clear.       Recent Labs   Lab Test 12/28/23  0724 12/26/23  1615 12/25/23  1226   WBC 7.0 4.6 5.9   RBC 4.44 4.28* 3.98*   HGB 13.2* 12.8* 11.8*    169 137*       Recent Labs   Lab Test 12/29/23  0218 12/28/23  2117 12/28/23  1707 12/28/23  1256 12/28/23  0724 12/26/23  1709 12/26/23  1615 12/25/23  1711 12/25/23  1226 12/24/23  1256 12/24/23  1128   NA  --   --   --   --  144  --  142  --  142  --  142   POTASSIUM  --   --   --   --  3.6  --  4.0  --  3.7  --  3.6   CHLORIDE  --   --   --   --  104  --  100  --  100  --  101   CO2  --   --   --   --  33*  --  34*  --  32*  --  31*   BUN  " "--   --   --   --  14.0  --  13.1  --  15.6  --  12.3   CR  --   --   --   --  0.95  --  0.84  --  0.85  --  0.75   * 166* 235*   < > 101*   < > 130*   < > 105*   < > 96   KARLIE  --   --   --   --  8.8  --  9.2  --  8.7*  --  8.8   MAG  --   --   --   --   --   --  2.1  --  2.1  --  2.0    < > = values in this interval not displayed.           No results for input(s): \"CULT\" in the last 168 hours.      No results found for this or any previous visit (from the past 48 hour(s)).        "

## 2023-12-29 NOTE — PLAN OF CARE
Goal Outcome Evaluation:    Summary: acute on chronic hypoxic respiratory failure d/t COPD exacerbation.     DATE & TIME: 12/29/23 3886-0021   Cognitive Concerns/ Orientation : A/Ox4, forgetful   BEHAVIOR & AGGRESSION TOOL COLOR: Green   ABNL VS/O2: VSS on 2L NC. ACEVES with expiratory wheezes. On scheduled nebs. Home O2 assessment completed   MOBILITY: Independent with walker and oxygen  PAIN MANAGMENT: low back pain managed with PRN Oxycodone, and scheduled Tylenol  DIET: Regular  BOWEL/BLADDER: Continent  ABNL LAB/BG: BG 94, 192  DRAIN/DEVICES: R PIV SL  SKIN: Scattered bruising and scabs, jose j bilateral lower extremities  TESTS/PROCEDURES: none this shift  D/C DATE: Pending improvement  OTHER IMPORTANT INFO: Pulmonology/RT following. Started on 40mg IV lasixs twice daily, On Zosyn Q6 for pneumonia

## 2023-12-29 NOTE — PROGRESS NOTES
[facility]    Medicine Progress Note - Hospitalist Service        Date of Admission:  12/6/2023  5:45 PM    Assessment & Plan:   Harrison Thomas is a 78 year old male with medical history significant for chronic hypoxic respiratory failure [has oxygen as needed at home], COPD, RUL lung mass s/p radiation, untreated BRENNEN, HTN, HLD, CAD, hypothyroidism admitted on 12/6/2023 with acute on chronic hypoxic respiratory failure likely 2/2 COPD exacerbation.      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  BRENNEN   Pulmonary edema  *Follows with Dr. Handy with pulmonology at [facility], last seen in September 2023.  On low dose chronic prednisone (2mg).  *Patient presented to the ED with increased shortness of breath over the past week, increased oxygen use at home.  *Admission clinical picture consistent with COPD exacerbation - poor air movement with diffuse expiratory wheezing - placed on steroids and nebs.  *BNP and procalcitonin wnl, COVID/flu/RSV negative on admission, resp viral panel negative (12/15).  *CT pulm angio 12/11 negative for PE, shows mild bronchial wall thickening and extensive emphysema.  *Underwent sniff test this admission that was negative.  *Received 5-day treatment course azithromycin at admission, resumed  mg every other day dosing for prophylaxis.  *Intermittently has required high flow nasal cannula and BiPAP  *Pulm consulted and following.  *Transitioned from IV solumedrol to prednisone 12/20.  -Continue prednisone, taper as ordered by pulmonology in [facility], currently on 30 mg daily as of 12/28/23.  -Continue pantoprazole until tapered back to PTA dose of prednisone.  -Duonebs qid.  -Home inhaler on hold.  -Encourage IS and flutter valve.  -Has received 7 days of Zosyn, discussed with pulmonary, patient appears slightly worse today, therefore will resume  Zosyn for 3 more days to complete a 10-day course.  -Respiratory culture obtained 12/22/23, grew 1+ Candida albicans.  -RRT on 12/23/23 due to increased shortness of breath and hypoxia. Improved with IV lasix and BiPAP. ? related to pulmonary edema/volume overload.  -Has been intermittently diuresed  -Discussed with Dr. Banks from pulmonary this morning.  He appears slightly worse compared to yesterday.  We both agreed that we will give empiric trial with IV Lasix 40 mg x 2 doses today  -Previous hospitalist discussed overall goals of care with patient on 12/26/23 plan is to continue with restorative cares at this time.  -Oxygen requirement stable at 2 L however clinically is very tenuous with increased work of breathing  -Continue monitoring in the hospital with ongoing IV antibiotics and diuretic trial as above.     Hypertension   Hyperlipidemia  Coronary artery disease  -Continue PTA metoprolol, amlodipine, aspirin, rosuvastatin and lasix     Chronic low back pain   *Patient is supposed to be getting a lumbar MRI soon.  Can be done as an outpatient.  -Continue PTA oxycodone.  -Gabapentin changed from 600 mg bid to 400 mg tid, continue.  -Hold naproxen for now in the setting of high-dose steroids since he would be at high risk for upper GI bleed and ulcers.  -Oral PPI for prophylaxis.  -Continue PRN Flexeril.  -Scheduled Tylenol tid.  -Lidocaine patches.     Constipation, resolved  *Given concern for abdominal distention, obtained abdominal x-ray on 12/18 that still showed large amount of stool throughout the colon.  Started having bowel movement on 12/19/23 and has been having at least daily bowel movements since then.  -Continue on aggressive bowel regimen.     Acute urinary retention-resolved  *Required straight cath x3 12/20-12/21 and martinez placed.  -Started flomax 12/21/23.  -successful trial of voiding on 12/23/23.   -Continue to monitor for retention following catheter removal.  -Aggressively treat  "constipation as above.     Hx of papillary thyroid cancer s/p thyroidectomy   Hypothyroidism   -Continue PTA levothyroxine.     Insomnia   -Continue PTA Trazodone.     Leukocytosis  -Related to steroids and pneumonia.  -WBC back to within normal limits as of 12/23/23.     Thrombocytopenia, resolved  Platelets in low 100s.  Not on any heparin products on admission through 12/20.  He is on baby aspirin twice daily, no signs of bleeding.  -Platelets 134 on 12/16/23, slowly trended down but now resolved  -Lovenox prophylaxis started 12/21/23 as platelets have trended up and are back to normal.     Bilateral pulmonary nodules  CT chest 12/11 showing scattered small bilateral pulmonary nodules measuring up to 5 mm are unchanged.  -Continued surveillance recommended.          Diet: Regular Diet Adult     DVT Prophylaxis: Enoxaparin (Lovenox) SQ   Mir Catheter: Not present  Code Status: No CPR- Do NOT Intubate     Disposition Plan      Expected Discharge Date: 12/29/2023, 12:00 PM      Discharge Comments: pt is on iv solumedrol      Entered: John Rosenbaum MD 12/29/2023, 9:43 AM        Clinically Significant Risk Factors              # Hypoalbuminemia: Lowest albumin = 3.4 g/dL at 12/8/2023 11:58 AM, will monitor as appropriate     # Hypertension: Noted on problem list        # Overweight: Estimated body mass index is 25.08 kg/m  as calculated from the following:    Height as of this encounter: 1.778 m (5' 10\").    Weight as of this encounter: 79.3 kg (174 lb 12.8 oz).                  The patient's care was discussed with the Bedside Nurse and Patient.    Medical Decision Making       **CLEAR ALL SELECTIONS**      Labs/Imaging Reviewed:  See Information above and Data section below  Time SPENT BY ME on the date of service doing chart review, history, exam, documentation & further activities per the note:  50 MINUTES    Chart documentation was completed, in part, with [employer] voice-recognition software. Even though " "reviewed, some grammatical, spelling, and word errors may remain.    oJhn Rosenbaum MD  Hospitalist Service  [facility]  Text Page 7AM-6PM  Securely message with the Vocera Web Console (learn more here)  Text page via [employer] Paging/Directory    ______________________________________________________________________    Interval History   Patient feels more dyspneic with slightly increased work of breathing today.  He feels he is slightly worse compared to yesterday.  Endorses mild cough.  Afebrile.    Data reviewed today: I reviewed all medications, new labs and imaging results over the last 24 hours. I personally reviewed no images or EKG's today.    Physical Exam   Vital signs:  Temp: 98.5  F (36.9  C) Temp src: Oral BP: 135/82 Pulse: 72   Resp: 18 SpO2: 91 % O2 Device: Nasal cannula Oxygen Delivery: 2 LPM Height: 177.8 cm (5' 10\") Weight: 79.3 kg (174 lb 12.8 oz)  Estimated body mass index is 25.08 kg/m  as calculated from the following:    Height as of this encounter: 1.778 m (5' 10\").    Weight as of this encounter: 79.3 kg (174 lb 12.8 oz).      Wt Readings from Last 2 Encounters:   12/29/23 79.3 kg (174 lb 12.8 oz)   12/01/23 83 kg (183 lb)       Gen: AAOX3, NAD, comfortable  HEENT: Supple neck, moist oral mucosa, no pallor  Resp: Coarse breath sounds bilaterally with scattered wheeze, slightly increased work of breathing but not using accessory muscles of respiration  CVS: RRR, no murmur  Abd/GI: Soft, non-tender. BS- normoactive.  Skin: Warm, dry no rashes  MSK: No joint deformities, no pedal edema  Neuro- CN- intact. No focal deficits.        No results found for this or any previous visit (from the past 24 hour(s)).  Medications    - MEDICATION INSTRUCTIONS -      - MEDICATION INSTRUCTIONS -        acetaminophen  1,000 mg Oral Q8H    acetylcysteine  2 mL Nebulization Q4H    amLODIPine  5 mg Oral At Bedtime    aspirin  81 mg Oral BID    azithromycin  500 mg Oral Every Other Day    " DULoxetine  20 mg Oral BID    enoxaparin ANTICOAGULANT  40 mg Subcutaneous Q24H    [Held by provider] Fluticasone-Umeclidin-Vilant  1 puff Inhalation Daily    furosemide  40 mg Oral Daily    gabapentin  400 mg Oral TID    insulin aspart  1-7 Units Subcutaneous TID AC    insulin aspart  1-5 Units Subcutaneous At Bedtime    ipratropium - albuterol 0.5 mg/2.5 mg/3 mL  3 mL Nebulization 4x daily    levothyroxine  125 mcg Oral Daily    lidocaine  2 patch Transdermal Q24H    metoprolol succinate ER  50 mg Oral QPM    pantoprazole  40 mg Oral QAM AC    polyethylene glycol  17 g Oral BID    predniSONE  30 mg Oral Daily    Followed by    [START ON 1/3/2024] predniSONE  20 mg Oral Daily    Followed by    [START ON 1/10/2024] predniSONE  10 mg Oral Daily    Followed by    [START ON 1/17/2024] predniSONE  5 mg Oral Daily    rosuvastatin  10 mg Oral QPM    senna-docusate  2 tablet Oral BID    sodium chloride (PF)  3 mL Intracatheter Q8H    tamsulosin  0.4 mg Oral Daily    traZODone  50 mg Oral At Bedtime

## 2023-12-30 ENCOUNTER — APPOINTMENT (OUTPATIENT)
Dept: PHYSICAL THERAPY | Facility: CLINIC | Age: 78
DRG: 190 | End: 2023-12-30
Payer: MEDICARE

## 2023-12-30 LAB
ANION GAP SERPL CALCULATED.3IONS-SCNC: 6 MMOL/L (ref 7–15)
BUN SERPL-MCNC: 14.4 MG/DL (ref 8–23)
CALCIUM SERPL-MCNC: 8.6 MG/DL (ref 8.8–10.2)
CHLORIDE SERPL-SCNC: 99 MMOL/L (ref 98–107)
CREAT SERPL-MCNC: 0.93 MG/DL (ref 0.67–1.17)
DEPRECATED HCO3 PLAS-SCNC: 37 MMOL/L (ref 22–29)
EGFRCR SERPLBLD CKD-EPI 2021: 85 ML/MIN/1.73M2
GLUCOSE BLDC GLUCOMTR-MCNC: 154 MG/DL (ref 70–99)
GLUCOSE BLDC GLUCOMTR-MCNC: 171 MG/DL (ref 70–99)
GLUCOSE BLDC GLUCOMTR-MCNC: 185 MG/DL (ref 70–99)
GLUCOSE BLDC GLUCOMTR-MCNC: 91 MG/DL (ref 70–99)
GLUCOSE BLDC GLUCOMTR-MCNC: 92 MG/DL (ref 70–99)
GLUCOSE SERPL-MCNC: 153 MG/DL (ref 70–99)
POTASSIUM SERPL-SCNC: 3.1 MMOL/L (ref 3.4–5.3)
POTASSIUM SERPL-SCNC: 3.7 MMOL/L (ref 3.4–5.3)
SODIUM SERPL-SCNC: 142 MMOL/L (ref 135–145)

## 2023-12-30 PROCEDURE — 999N000157 HC STATISTIC RCP TIME EA 10 MIN

## 2023-12-30 PROCEDURE — 120N000001 HC R&B MED SURG/OB

## 2023-12-30 PROCEDURE — 250N000009 HC RX 250: Performed by: STUDENT IN AN ORGANIZED HEALTH CARE EDUCATION/TRAINING PROGRAM

## 2023-12-30 PROCEDURE — 250N000009 HC RX 250: Performed by: HOSPITALIST

## 2023-12-30 PROCEDURE — 250N000013 HC RX MED GY IP 250 OP 250 PS 637: Performed by: HOSPITALIST

## 2023-12-30 PROCEDURE — 250N000013 HC RX MED GY IP 250 OP 250 PS 637: Performed by: INTERNAL MEDICINE

## 2023-12-30 PROCEDURE — 84132 ASSAY OF SERUM POTASSIUM: CPT | Performed by: INTERNAL MEDICINE

## 2023-12-30 PROCEDURE — 250N000011 HC RX IP 250 OP 636: Performed by: INTERNAL MEDICINE

## 2023-12-30 PROCEDURE — 250N000013 HC RX MED GY IP 250 OP 250 PS 637: Performed by: STUDENT IN AN ORGANIZED HEALTH CARE EDUCATION/TRAINING PROGRAM

## 2023-12-30 PROCEDURE — 99233 SBSQ HOSP IP/OBS HIGH 50: CPT | Performed by: INTERNAL MEDICINE

## 2023-12-30 PROCEDURE — 250N000011 HC RX IP 250 OP 636: Performed by: HOSPITALIST

## 2023-12-30 PROCEDURE — 250N000012 HC RX MED GY IP 250 OP 636 PS 637: Performed by: STUDENT IN AN ORGANIZED HEALTH CARE EDUCATION/TRAINING PROGRAM

## 2023-12-30 PROCEDURE — 94640 AIRWAY INHALATION TREATMENT: CPT

## 2023-12-30 PROCEDURE — 97116 GAIT TRAINING THERAPY: CPT | Mod: GP | Performed by: PHYSICAL THERAPY ASSISTANT

## 2023-12-30 PROCEDURE — 94640 AIRWAY INHALATION TREATMENT: CPT | Mod: 76

## 2023-12-30 PROCEDURE — 36415 COLL VENOUS BLD VENIPUNCTURE: CPT | Performed by: INTERNAL MEDICINE

## 2023-12-30 PROCEDURE — 80048 BASIC METABOLIC PNL TOTAL CA: CPT | Performed by: INTERNAL MEDICINE

## 2023-12-30 RX ORDER — FUROSEMIDE 10 MG/ML
40 INJECTION INTRAMUSCULAR; INTRAVENOUS ONCE
Status: COMPLETED | OUTPATIENT
Start: 2023-12-30 | End: 2023-12-30

## 2023-12-30 RX ORDER — POTASSIUM CHLORIDE 1500 MG/1
40 TABLET, EXTENDED RELEASE ORAL ONCE
Status: COMPLETED | OUTPATIENT
Start: 2023-12-30 | End: 2023-12-30

## 2023-12-30 RX ADMIN — INSULIN ASPART 1 UNITS: 100 INJECTION, SOLUTION INTRAVENOUS; SUBCUTANEOUS at 13:24

## 2023-12-30 RX ADMIN — ASPIRIN 81 MG: 81 TABLET, COATED ORAL at 08:22

## 2023-12-30 RX ADMIN — IPRATROPIUM BROMIDE AND ALBUTEROL SULFATE 3 ML: .5; 3 SOLUTION RESPIRATORY (INHALATION) at 19:40

## 2023-12-30 RX ADMIN — PANTOPRAZOLE SODIUM 40 MG: 40 TABLET, DELAYED RELEASE ORAL at 06:34

## 2023-12-30 RX ADMIN — OXYCODONE HYDROCHLORIDE 5 MG: 5 TABLET ORAL at 17:31

## 2023-12-30 RX ADMIN — DOCUSATE SODIUM 50 MG AND SENNOSIDES 8.6 MG 2 TABLET: 8.6; 5 TABLET, FILM COATED ORAL at 08:21

## 2023-12-30 RX ADMIN — ROSUVASTATIN CALCIUM 10 MG: 10 TABLET, FILM COATED ORAL at 22:49

## 2023-12-30 RX ADMIN — DULOXETINE HYDROCHLORIDE 20 MG: 20 CAPSULE, DELAYED RELEASE ORAL at 22:48

## 2023-12-30 RX ADMIN — IPRATROPIUM BROMIDE AND ALBUTEROL SULFATE 3 ML: .5; 3 SOLUTION RESPIRATORY (INHALATION) at 07:22

## 2023-12-30 RX ADMIN — ACETAMINOPHEN 1000 MG: 500 TABLET, FILM COATED ORAL at 06:33

## 2023-12-30 RX ADMIN — ACETYLCYSTEINE 2 ML: 200 SOLUTION ORAL; RESPIRATORY (INHALATION) at 19:40

## 2023-12-30 RX ADMIN — DULOXETINE HYDROCHLORIDE 20 MG: 20 CAPSULE, DELAYED RELEASE ORAL at 08:22

## 2023-12-30 RX ADMIN — FUROSEMIDE 40 MG: 10 INJECTION, SOLUTION INTRAMUSCULAR; INTRAVENOUS at 12:23

## 2023-12-30 RX ADMIN — AZITHROMYCIN DIHYDRATE 500 MG: 250 TABLET ORAL at 08:22

## 2023-12-30 RX ADMIN — IPRATROPIUM BROMIDE AND ALBUTEROL SULFATE 3 ML: .5; 3 SOLUTION RESPIRATORY (INHALATION) at 02:07

## 2023-12-30 RX ADMIN — AMLODIPINE BESYLATE 5 MG: 5 TABLET ORAL at 22:49

## 2023-12-30 RX ADMIN — ACETAMINOPHEN 1000 MG: 500 TABLET, FILM COATED ORAL at 14:49

## 2023-12-30 RX ADMIN — IPRATROPIUM BROMIDE AND ALBUTEROL SULFATE 3 ML: .5; 3 SOLUTION RESPIRATORY (INHALATION) at 23:17

## 2023-12-30 RX ADMIN — PIPERACILLIN AND TAZOBACTAM 3.38 G: 3; .375 INJECTION, POWDER, FOR SOLUTION INTRAVENOUS at 23:53

## 2023-12-30 RX ADMIN — IPRATROPIUM BROMIDE AND ALBUTEROL SULFATE 3 ML: .5; 3 SOLUTION RESPIRATORY (INHALATION) at 11:28

## 2023-12-30 RX ADMIN — ASPIRIN 81 MG: 81 TABLET, COATED ORAL at 22:48

## 2023-12-30 RX ADMIN — OXYCODONE HYDROCHLORIDE 5 MG: 5 TABLET ORAL at 23:54

## 2023-12-30 RX ADMIN — LEVOTHYROXINE SODIUM 125 MCG: 125 TABLET ORAL at 08:22

## 2023-12-30 RX ADMIN — TRAZODONE HYDROCHLORIDE 50 MG: 50 TABLET ORAL at 23:55

## 2023-12-30 RX ADMIN — GABAPENTIN 400 MG: 100 CAPSULE ORAL at 08:22

## 2023-12-30 RX ADMIN — INSULIN ASPART 1 UNITS: 100 INJECTION, SOLUTION INTRAVENOUS; SUBCUTANEOUS at 17:31

## 2023-12-30 RX ADMIN — ACETAMINOPHEN 1000 MG: 500 TABLET, FILM COATED ORAL at 22:49

## 2023-12-30 RX ADMIN — PIPERACILLIN AND TAZOBACTAM 3.38 G: 3; .375 INJECTION, POWDER, FOR SOLUTION INTRAVENOUS at 06:33

## 2023-12-30 RX ADMIN — PREDNISONE 30 MG: 20 TABLET ORAL at 08:22

## 2023-12-30 RX ADMIN — ACETYLCYSTEINE 2 ML: 200 SOLUTION ORAL; RESPIRATORY (INHALATION) at 23:17

## 2023-12-30 RX ADMIN — METOPROLOL SUCCINATE 50 MG: 50 TABLET, EXTENDED RELEASE ORAL at 22:49

## 2023-12-30 RX ADMIN — ACETYLCYSTEINE 2 ML: 200 SOLUTION ORAL; RESPIRATORY (INHALATION) at 11:28

## 2023-12-30 RX ADMIN — PIPERACILLIN AND TAZOBACTAM 3.38 G: 3; .375 INJECTION, POWDER, FOR SOLUTION INTRAVENOUS at 00:47

## 2023-12-30 RX ADMIN — CYCLOBENZAPRINE 10 MG: 10 TABLET, FILM COATED ORAL at 23:53

## 2023-12-30 RX ADMIN — ACETYLCYSTEINE 2 ML: 200 SOLUTION ORAL; RESPIRATORY (INHALATION) at 07:22

## 2023-12-30 RX ADMIN — PIPERACILLIN AND TAZOBACTAM 3.38 G: 3; .375 INJECTION, POWDER, FOR SOLUTION INTRAVENOUS at 12:23

## 2023-12-30 RX ADMIN — TAMSULOSIN HYDROCHLORIDE 0.4 MG: 0.4 CAPSULE ORAL at 08:22

## 2023-12-30 RX ADMIN — POTASSIUM CHLORIDE 40 MEQ: 1500 TABLET, EXTENDED RELEASE ORAL at 12:23

## 2023-12-30 RX ADMIN — GABAPENTIN 400 MG: 100 CAPSULE ORAL at 22:48

## 2023-12-30 RX ADMIN — ENOXAPARIN SODIUM 40 MG: 40 INJECTION SUBCUTANEOUS at 12:23

## 2023-12-30 RX ADMIN — ACETYLCYSTEINE 2 ML: 200 SOLUTION ORAL; RESPIRATORY (INHALATION) at 02:07

## 2023-12-30 RX ADMIN — PIPERACILLIN AND TAZOBACTAM 3.38 G: 3; .375 INJECTION, POWDER, FOR SOLUTION INTRAVENOUS at 18:48

## 2023-12-30 RX ADMIN — GABAPENTIN 400 MG: 100 CAPSULE ORAL at 17:31

## 2023-12-30 ASSESSMENT — ACTIVITIES OF DAILY LIVING (ADL)
ADLS_ACUITY_SCORE: 37
ADLS_ACUITY_SCORE: 36
ADLS_ACUITY_SCORE: 37
ADLS_ACUITY_SCORE: 36
ADLS_ACUITY_SCORE: 36
ADLS_ACUITY_SCORE: 37
ADLS_ACUITY_SCORE: 36
ADLS_ACUITY_SCORE: 36
ADLS_ACUITY_SCORE: 37
ADLS_ACUITY_SCORE: 37
ADLS_ACUITY_SCORE: 36
ADLS_ACUITY_SCORE: 36

## 2023-12-30 NOTE — PLAN OF CARE
Goal Outcome Evaluation:       Summary: acute on chronic hypoxic respiratory failure d/t COPD exacerbation.   DATE & TIME: 12/29/23, 6185-6836  Cognitive Concerns/ Orientation : A/Ox4, forgetful   BEHAVIOR & AGGRESSION TOOL COLOR: Green   ABNL VS/O2: VSS on 2-3L NC, needs more O2 with exertional activity. Goal > 88%, has remained stable ranging 88-99%. ACEVES with expiratory wheezes/coarse crackles. On scheduled nebs. Home O2 assessment completed on day shift yesterday.   MOBILITY: SBA with walker and oxygen  PAIN MANAGMENT: low back pain managed with PRN Oxycodone and flexeril, and scheduled Tylenol  DIET: Regular, tolerating  BOWEL/BLADDER: Continent, good UOP.  ABNL LAB/BG: BG 91  DRAIN/DEVICES: R PIV SL  SKIN: Dusky complexion. Scattered bruising and scabs, jose j bilateral lower extremities. Abrasion to R. Knee.   TESTS/PROCEDURES: none this shift  D/C DATE: Pending improvement  OTHER IMPORTANT INFO: Pulmonology/RT following. Started on 40mg IV lasix twice daily, On Zosyn Q6 for pneumonia                    Problem: Falls - Risk of:  Goal: Will remain free from falls  Description: Will remain free from falls  Outcome: Ongoing  Note: Patient absent of falls this shift. Bed in lowest position, side rails up. Bed alarm remains on. Call-light within reach. Patient instructed to use call-light for needs. Goal: Absence of physical injury  Description: Absence of physical injury  Outcome: Ongoing  Note: Call light in reach, bed in lowest position, bed alarm on. Educated on use of call light. Patient expressed understanding. Hourly visual checks performed. Toileting offered to patient. Arm band & falling star in place. Will continue to monitor. Problem: Skin Integrity:  Goal: Will show no infection signs and symptoms  Description: Will show no infection signs and symptoms  Outcome: Ongoing  Note: Patient remains afebrile. Goal: Absence of new skin breakdown  Description: Absence of new skin breakdown  Outcome: Ongoing  Note: No new skin breakdown noted this shift. Patient assisted to turn and reposition every 2 hours in bed. Coccyx/buttocks noted to be red. EPC applied. Problem: Airway Clearance - Ineffective  Goal: Achieve or maintain patent airway  Outcome: Ongoing  Note: Patient able to cough to clear secretions. Problem: Gas Exchange - Impaired  Goal: Absence of hypoxia  Outcome: Ongoing  Note: Patient remains on BiPAP with FiO2 of 90%. SpO2> 90% on BiPAP. Continuing to monitor pulse oximetry continuously. Problem: Breathing Pattern - Ineffective  Goal: Ability to achieve and maintain a regular respiratory rate  Outcome: Ongoing  Note: Respirations in the 30s this shift. Respiration pattern regular. Problem: Body Temperature -  Risk of, Imbalanced  Goal: Ability to maintain a body temperature within defined limits  Outcome: Ongoing  Note: T: 97 F axillary. Will continue to monitor.       Problem: Isolation Precautions - Risk of Spread of Infection  Goal: Prevent transmission of infection  Outcome: Ongoing  Note: Patient continues with droplet plus precautions. Explained isolation precautions to patient. Problem: Nutrition Deficits  Goal: Optimize nutrtional status  Outcome: Ongoing  Note: Patient without meal intake this shift. Encouraged fluid intake. Problem: Loneliness or Risk for Loneliness  Goal: Demonstrate positive use of time alone when socialization is not possible  Outcome: Ongoing  Note: Patient watching TV while during alone time. Patient reports positive feelings. Problem: Fatigue  Goal: Verbalize increase energy and improved vitality  Outcome: Ongoing  Note: Patient fatigues with turning in bed easily. Care plan reviewed with patient. Patient verbalizes understanding of the plan of care and contributes to goal setting.

## 2023-12-30 NOTE — PLAN OF CARE
Goal Outcome Evaluation:      Plan of Care Reviewed With: patient  Summary: acute on chronic hypoxic respiratory failure d/t COPD exacerbation.   DATE & TIME: 12/29/23, [id number]  Cognitive Concerns/ Orientation : A/Ox4, forgetful   BEHAVIOR & AGGRESSION TOOL COLOR: Green   ABNL VS/O2: VSS on 2-3L NC, needs more O2 with exertional activity. Goal > 88%, has remained stable ranging 88-99%. ACEVES with expiratory wheezes/coarse crackles. On scheduled nebs. Home O2 assessment completed on day shift.   MOBILITY: SBA with walker and oxygen  PAIN MANAGMENT: low back pain managed with PRN Oxycodone and flexeril, and scheduled Tylenol  DIET: Regular, tolerating  BOWEL/BLADDER: Continent, good UOP.  ABNL LAB/BG: , 148   DRAIN/DEVICES: R PIV SL  SKIN: Dusky complexion. Scattered bruising and scabs, jose j bilateral lower extremities. Abrasion to R. Knee.   TESTS/PROCEDURES: none this shift  D/C DATE: Pending improvement  OTHER IMPORTANT INFO: Pulmonology/RT following. Started on 40mg IV lasix twice daily, On Zosyn Q6 for pneumonia

## 2023-12-30 NOTE — PLAN OF CARE
Goal Outcome Evaluation:    DATE & TIME: 12/30/23, 9160-4396  Cognitive Concerns/ Orientation : A/Ox4, forgetful   BEHAVIOR & AGGRESSION TOOL COLOR: Green   ABNL VS/O2: VSS on 2-3L NC, needs more O2 with exertional activity. Goal > 88%, has remained stable ranging 88-99%. ACEVES with expiratory wheezes/coarse crackles. On scheduled nebs. .   MOBILITY: SBA with walker and oxygen, up to chair most of afternoon. Walked in hallway when family visited  PAIN MANAGMENT: low back pain managed with PRN Oxycodone and flexeril, and scheduled Tylenol  DIET: Regular, tolerating  BOWEL/BLADDER: Continent, good UOP.  ABNL LAB/BG: BG 92, 154. K 3.1 replaced and recheck 1600  DRAIN/DEVICES: R PIV SL  SKIN: Dusky complexion. Scattered bruising and scabs, jose j bilateral lower extremities. Abrasion to R. Knee.   TESTS/PROCEDURES: none this shift  D/C DATE: Pending improvement  OTHER IMPORTANT INFO: Pulmonology/RT following. Started on 40mg IV lasix twice daily, On Zosyn Q6 for pneumonia. Home O2 assessment completed 12/29

## 2023-12-30 NOTE — PLAN OF CARE
Goal Outcome Evaluation:      Plan of Care Reviewed With: patient  Summary: acute on chronic hypoxic respiratory failure d/t COPD exacerbation.   DATE & TIME: 12/29/23, [id number]  Cognitive Concerns/ Orientation : A/Ox4, forgetful   BEHAVIOR & AGGRESSION TOOL COLOR: Green   ABNL VS/O2: VSS on 2-3L NC, needs more O2 with exertional activity. Goal > 88%, has remained stable ranging 88-99%. ACEVES with expiratory wheezes/coarse crackles. On scheduled nebs. Home O2 assessment completed on day shift.   MOBILITY: SBA with walker and oxygen  PAIN MANAGMENT: low back pain managed with PRN Oxycodone and flexeril, and scheduled Tylenol  DIET: Regular, tolerating  BOWEL/BLADDER: Continent, good UOP.  ABNL LAB/BG: , 148   DRAIN/DEVICES: R PIV SL  SKIN: Dusky complexion. Scattered bruising and scabs, jose j bilateral lower extremities. Abrasion to R. Knee.   TESTS/PROCEDURES: none this shift  D/C DATE: Pending improvement  OTHER IMPORTANT INFO: Pulmonology/RT following. Started on 40mg IV lasix twice daily, On Zosyn Q6 for pneumonia

## 2023-12-30 NOTE — PROGRESS NOTES
[facility]    Medicine Progress Note - Hospitalist Service        Date of Admission:  12/6/2023  5:45 PM    Assessment & Plan:   Harrison Thomas is a 78 year old male with medical history significant for chronic hypoxic respiratory failure [has oxygen as needed at home], COPD, RUL lung mass s/p radiation, untreated BRENNEN, HTN, HLD, CAD, hypothyroidism admitted on 12/6/2023 with acute on chronic hypoxic respiratory failure likely 2/2 COPD exacerbation.      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  BRENNEN   Pulmonary edema  *Follows with Dr. Handy with pulmonology at [facility], last seen in September 2023.  On low dose chronic prednisone (2mg).  *Patient presented to the ED with increased shortness of breath over the past week, increased oxygen use at home.  *Admission clinical picture consistent with COPD exacerbation - poor air movement with diffuse expiratory wheezing - placed on steroids and nebs.  *BNP and procalcitonin wnl, COVID/flu/RSV negative on admission, resp viral panel negative (12/15).  *CT pulm angio 12/11 negative for PE, shows mild bronchial wall thickening and extensive emphysema.  *Underwent sniff test this admission that was negative.  *Received 5-day treatment course azithromycin at admission, resumed  mg every other day dosing for prophylaxis.  *Intermittently has required high flow nasal cannula and BiPAP  *[facility] pulmonary following.  *Transitioned from IV solumedrol to prednisone 12/20.  -Continue pantoprazole until tapered back to PTA dose of prednisone.  -Duonebs qid.  -Home inhaler on hold.  -Respiratory culture obtained 12/22/23, grew 1+ Candida albicans.  -RRT on 12/23/23 due to increased shortness of breath and hypoxia. Improved with IV lasix and BiPAP. ? related to pulmonary edema/volume overload.  -Discussed with Dr. Banks from pulmonary  yesterday morning.  We both agreed on  further empiric trial with IV Lasix.  He received 2 doses Lasix 40 mg IV yesterday.  -Give additional dose of Lasix 40 mg IV this morning pending BMP  -Plan to resume oral Lasix 40 mg p.o. daily starting tomorrow morning.  -Previous hospitalist discussed overall goals of care with patient on 12/26/23, plan is to continue with restorative cares at this time.  -Oxygen requirement stable at 2 L   -Continue IV Zosyn to complete a 10-day course, day 9/10 today  -Continue prednisone, taper as ordered by pulmonology in [facility], currently on 30 mg daily as of 12/28/23.  -Noted plans by pulmonary to start MetaNebs today     Hypertension   Hyperlipidemia  Coronary artery disease  -Continue PTA metoprolol, amlodipine, aspirin, rosuvastatin     Chronic low back pain   *Patient is supposed to be getting a lumbar MRI soon.  Can be done as an outpatient.  -Continue PTA oxycodone.  -Gabapentin changed from 600 mg bid to 400 mg tid, continue.  -Hold naproxen for now in the setting of high-dose steroids since he would be at high risk for upper GI bleed and ulcers.  -Oral PPI for prophylaxis.  -Continue PRN Flexeril.  -Scheduled Tylenol tid.  -Lidocaine patches.     Constipation, resolved  *Given concern for abdominal distention, obtained abdominal x-ray on 12/18 that still showed large amount of stool throughout the colon.  Started having bowel movement on 12/19/23 and has been having at least daily bowel movements since then.  -Continue on aggressive bowel regimen.     Acute urinary retention-resolved  *Required straight cath x3 12/20-12/21 and martinez placed.  -Started flomax 12/21/23.  -successful trial of voiding on 12/23/23.   -Continue to monitor for retention following catheter removal.  -Aggressively treat constipation as above.     Hx of papillary thyroid cancer s/p thyroidectomy   Hypothyroidism   -Continue PTA levothyroxine.     Insomnia   -Continue PTA Trazodone.    "  Leukocytosis  -Related to steroids and pneumonia.  -WBC back to within normal limits as of 12/23/23.     Thrombocytopenia, resolved  Platelets in low 100s.  Not on any heparin products on admission through 12/20.  He is on baby aspirin twice daily, no signs of bleeding.  -Platelets 134 on 12/16/23, slowly trended down but now resolved  -Lovenox prophylaxis started 12/21/23 as platelets have trended up and are back to normal.     Bilateral pulmonary nodules  CT chest 12/11 showing scattered small bilateral pulmonary nodules measuring up to 5 mm are unchanged.  -Continued surveillance recommended.          Diet: Regular Diet Adult     DVT Prophylaxis: Enoxaparin (Lovenox) SQ   Mir Catheter: Not present  Code Status: No CPR- Do NOT Intubate     Disposition Plan      Expected Discharge Date: 1/1/24      Discharge Comments: pt is on iv solumedrol      Entered: John Rosenbaum MD 12/30/2023, 9:02 AM        Clinically Significant Risk Factors              # Hypoalbuminemia: Lowest albumin = 3.4 g/dL at 12/8/2023 11:58 AM, will monitor as appropriate     # Hypertension: Noted on problem list        # Overweight: Estimated body mass index is 25.08 kg/m  as calculated from the following:    Height as of this encounter: 1.778 m (5' 10\").    Weight as of this encounter: 79.3 kg (174 lb 12.8 oz).                  The patient's care was discussed with the Bedside Nurse and Patient.    Medical Decision Making       **CLEAR ALL SELECTIONS**      Labs/Imaging Reviewed:  See Information above and Data section below  Time SPENT BY ME on the date of service doing chart review, history, exam, documentation & further activities per the note:  50 MINUTES    Chart documentation was completed, in part, with [employer] voice-recognition software. Even though reviewed, some grammatical, spelling, and word errors may remain.    John Rosenbaum MD  Hospitalist Service  [facility]  Text Page 7AM-6PM  Securely " "message with the Vocera Web Console (learn more here)  Text page via [employer] Paging/Directory    ______________________________________________________________________    Interval History   Not significantly different from yesterday.  Continues to have dyspnea especially with minimal activity which is unchanged from yesterday.  Oxygen requirement is stable.  He is unsure if he has had significant improvement with 2 doses of IV diuretics yesterday.    Data reviewed today: I reviewed all medications, new labs and imaging results over the last 24 hours. I personally reviewed no images or EKG's today.    Physical Exam   Vital signs:  Temp: 98.2  F (36.8  C) Temp src: Oral BP: 135/81 Pulse: 67   Resp: 18 SpO2: 98 % O2 Device: Nasal cannula Oxygen Delivery: 2 LPM Height: 177.8 cm (5' 10\") Weight: 79.3 kg (174 lb 12.8 oz)  Estimated body mass index is 25.08 kg/m  as calculated from the following:    Height as of this encounter: 1.778 m (5' 10\").    Weight as of this encounter: 79.3 kg (174 lb 12.8 oz).      Wt Readings from Last 2 Encounters:   12/29/23 79.3 kg (174 lb 12.8 oz)   12/01/23 83 kg (183 lb)       Gen: AAOX3, NAD, comfortable  HEENT: Supple neck, moist oral mucosa, no pallor  Resp: Coarse breath bilaterally with scattered wheeze.  Tachypneic.  Not using accessory muscle  CVS: RRR, no murmur  Abd/GI: Soft, non-tender. BS- normoactive.  Skin: Warm, dry no rashes  MSK: no pedal edema  Neuro- CN- intact. No focal deficits.        No results found for this or any previous visit (from the past 24 hour(s)).  Medications    - MEDICATION INSTRUCTIONS -      - MEDICATION INSTRUCTIONS -        acetaminophen  1,000 mg Oral Q8H    acetylcysteine  2 mL Nebulization Q4H    amLODIPine  5 mg Oral At Bedtime    aspirin  81 mg Oral BID    azithromycin  500 mg Oral Every Other Day    DULoxetine  20 mg Oral BID    enoxaparin ANTICOAGULANT  40 mg Subcutaneous Q24H    [Held by provider] Fluticasone-Umeclidin-Vilant  1 puff Inhalation " Daily    [Held by provider] furosemide  40 mg Oral Daily    gabapentin  400 mg Oral TID    insulin aspart  1-7 Units Subcutaneous TID AC    insulin aspart  1-5 Units Subcutaneous At Bedtime    ipratropium - albuterol 0.5 mg/2.5 mg/3 mL  3 mL Nebulization 4x daily    levothyroxine  125 mcg Oral Daily    lidocaine  2 patch Transdermal Q24H    metoprolol succinate ER  50 mg Oral QPM    pantoprazole  40 mg Oral QAM AC    piperacillin-tazobactam  3.375 g Intravenous Q6H    polyethylene glycol  17 g Oral BID    predniSONE  30 mg Oral Daily    Followed by    [START ON 1/3/2024] predniSONE  20 mg Oral Daily    Followed by    [START ON 1/10/2024] predniSONE  10 mg Oral Daily    Followed by    [START ON 1/17/2024] predniSONE  5 mg Oral Daily    rosuvastatin  10 mg Oral QPM    senna-docusate  2 tablet Oral BID    sodium chloride (PF)  3 mL Intracatheter Q8H    tamsulosin  0.4 mg Oral Daily    traZODone  50 mg Oral At Bedtime

## 2023-12-31 ENCOUNTER — APPOINTMENT (OUTPATIENT)
Dept: PHYSICAL THERAPY | Facility: CLINIC | Age: 78
DRG: 190 | End: 2023-12-31
Payer: MEDICARE

## 2023-12-31 LAB
ANION GAP SERPL CALCULATED.3IONS-SCNC: 4 MMOL/L (ref 7–15)
BUN SERPL-MCNC: 14.3 MG/DL (ref 8–23)
CALCIUM SERPL-MCNC: 8.8 MG/DL (ref 8.8–10.2)
CHLORIDE SERPL-SCNC: 103 MMOL/L (ref 98–107)
CREAT SERPL-MCNC: 1.02 MG/DL (ref 0.67–1.17)
DEPRECATED HCO3 PLAS-SCNC: 40 MMOL/L (ref 22–29)
EGFRCR SERPLBLD CKD-EPI 2021: 76 ML/MIN/1.73M2
GLUCOSE BLDC GLUCOMTR-MCNC: 108 MG/DL (ref 70–99)
GLUCOSE BLDC GLUCOMTR-MCNC: 117 MG/DL (ref 70–99)
GLUCOSE BLDC GLUCOMTR-MCNC: 131 MG/DL (ref 70–99)
GLUCOSE BLDC GLUCOMTR-MCNC: 143 MG/DL (ref 70–99)
GLUCOSE BLDC GLUCOMTR-MCNC: 78 MG/DL (ref 70–99)
GLUCOSE SERPL-MCNC: 89 MG/DL (ref 70–99)
POTASSIUM SERPL-SCNC: 3.3 MMOL/L (ref 3.4–5.3)
POTASSIUM SERPL-SCNC: 3.9 MMOL/L (ref 3.4–5.3)
SODIUM SERPL-SCNC: 147 MMOL/L (ref 135–145)

## 2023-12-31 PROCEDURE — 999N000157 HC STATISTIC RCP TIME EA 10 MIN

## 2023-12-31 PROCEDURE — 80048 BASIC METABOLIC PNL TOTAL CA: CPT | Performed by: INTERNAL MEDICINE

## 2023-12-31 PROCEDURE — 250N000011 HC RX IP 250 OP 636: Performed by: HOSPITALIST

## 2023-12-31 PROCEDURE — 36415 COLL VENOUS BLD VENIPUNCTURE: CPT | Performed by: INTERNAL MEDICINE

## 2023-12-31 PROCEDURE — 250N000013 HC RX MED GY IP 250 OP 250 PS 637: Performed by: INTERNAL MEDICINE

## 2023-12-31 PROCEDURE — 250N000013 HC RX MED GY IP 250 OP 250 PS 637: Performed by: HOSPITALIST

## 2023-12-31 PROCEDURE — 94640 AIRWAY INHALATION TREATMENT: CPT | Mod: 76

## 2023-12-31 PROCEDURE — 97116 GAIT TRAINING THERAPY: CPT | Mod: GP | Performed by: PHYSICAL THERAPY ASSISTANT

## 2023-12-31 PROCEDURE — 99233 SBSQ HOSP IP/OBS HIGH 50: CPT | Performed by: INTERNAL MEDICINE

## 2023-12-31 PROCEDURE — 84132 ASSAY OF SERUM POTASSIUM: CPT | Performed by: INTERNAL MEDICINE

## 2023-12-31 PROCEDURE — 250N000009 HC RX 250: Performed by: STUDENT IN AN ORGANIZED HEALTH CARE EDUCATION/TRAINING PROGRAM

## 2023-12-31 PROCEDURE — 250N000012 HC RX MED GY IP 250 OP 636 PS 637: Performed by: STUDENT IN AN ORGANIZED HEALTH CARE EDUCATION/TRAINING PROGRAM

## 2023-12-31 PROCEDURE — 5A09357 ASSISTANCE WITH RESPIRATORY VENTILATION, LESS THAN 24 CONSECUTIVE HOURS, CONTINUOUS POSITIVE AIRWAY PRESSURE: ICD-10-PCS | Performed by: STUDENT IN AN ORGANIZED HEALTH CARE EDUCATION/TRAINING PROGRAM

## 2023-12-31 PROCEDURE — 94640 AIRWAY INHALATION TREATMENT: CPT

## 2023-12-31 PROCEDURE — 250N000013 HC RX MED GY IP 250 OP 250 PS 637: Performed by: STUDENT IN AN ORGANIZED HEALTH CARE EDUCATION/TRAINING PROGRAM

## 2023-12-31 PROCEDURE — 250N000011 HC RX IP 250 OP 636: Performed by: INTERNAL MEDICINE

## 2023-12-31 PROCEDURE — 120N000001 HC R&B MED SURG/OB

## 2023-12-31 PROCEDURE — 94660 CPAP INITIATION&MGMT: CPT

## 2023-12-31 PROCEDURE — 97530 THERAPEUTIC ACTIVITIES: CPT | Mod: GP | Performed by: PHYSICAL THERAPY ASSISTANT

## 2023-12-31 RX ORDER — POTASSIUM CHLORIDE 1500 MG/1
40 TABLET, EXTENDED RELEASE ORAL ONCE
Status: COMPLETED | OUTPATIENT
Start: 2023-12-31 | End: 2023-12-31

## 2023-12-31 RX ADMIN — PIPERACILLIN AND TAZOBACTAM 3.38 G: 3; .375 INJECTION, POWDER, FOR SOLUTION INTRAVENOUS at 05:49

## 2023-12-31 RX ADMIN — GABAPENTIN 400 MG: 100 CAPSULE ORAL at 16:42

## 2023-12-31 RX ADMIN — ENOXAPARIN SODIUM 40 MG: 40 INJECTION SUBCUTANEOUS at 12:32

## 2023-12-31 RX ADMIN — PREDNISONE 30 MG: 20 TABLET ORAL at 08:55

## 2023-12-31 RX ADMIN — GABAPENTIN 400 MG: 100 CAPSULE ORAL at 08:55

## 2023-12-31 RX ADMIN — ASPIRIN 81 MG: 81 TABLET, COATED ORAL at 20:44

## 2023-12-31 RX ADMIN — OXYCODONE HYDROCHLORIDE 5 MG: 5 TABLET ORAL at 23:40

## 2023-12-31 RX ADMIN — DULOXETINE HYDROCHLORIDE 20 MG: 20 CAPSULE, DELAYED RELEASE ORAL at 08:55

## 2023-12-31 RX ADMIN — IPRATROPIUM BROMIDE AND ALBUTEROL SULFATE 3 ML: .5; 3 SOLUTION RESPIRATORY (INHALATION) at 10:45

## 2023-12-31 RX ADMIN — POTASSIUM CHLORIDE 40 MEQ: 1500 TABLET, EXTENDED RELEASE ORAL at 12:32

## 2023-12-31 RX ADMIN — ACETYLCYSTEINE 2 ML: 200 SOLUTION ORAL; RESPIRATORY (INHALATION) at 07:32

## 2023-12-31 RX ADMIN — DULOXETINE HYDROCHLORIDE 20 MG: 20 CAPSULE, DELAYED RELEASE ORAL at 20:44

## 2023-12-31 RX ADMIN — IPRATROPIUM BROMIDE AND ALBUTEROL SULFATE 3 ML: .5; 3 SOLUTION RESPIRATORY (INHALATION) at 19:33

## 2023-12-31 RX ADMIN — ACETYLCYSTEINE 2 ML: 200 SOLUTION ORAL; RESPIRATORY (INHALATION) at 19:33

## 2023-12-31 RX ADMIN — PANTOPRAZOLE SODIUM 40 MG: 40 TABLET, DELAYED RELEASE ORAL at 05:49

## 2023-12-31 RX ADMIN — ROSUVASTATIN CALCIUM 10 MG: 10 TABLET, FILM COATED ORAL at 20:44

## 2023-12-31 RX ADMIN — PIPERACILLIN AND TAZOBACTAM 3.38 G: 3; .375 INJECTION, POWDER, FOR SOLUTION INTRAVENOUS at 17:50

## 2023-12-31 RX ADMIN — GABAPENTIN 400 MG: 100 CAPSULE ORAL at 23:33

## 2023-12-31 RX ADMIN — IPRATROPIUM BROMIDE AND ALBUTEROL SULFATE 3 ML: .5; 3 SOLUTION RESPIRATORY (INHALATION) at 15:34

## 2023-12-31 RX ADMIN — ACETYLCYSTEINE 2 ML: 200 SOLUTION ORAL; RESPIRATORY (INHALATION) at 10:45

## 2023-12-31 RX ADMIN — ACETAMINOPHEN 1000 MG: 500 TABLET, FILM COATED ORAL at 16:43

## 2023-12-31 RX ADMIN — TRAZODONE HYDROCHLORIDE 50 MG: 50 TABLET ORAL at 23:34

## 2023-12-31 RX ADMIN — LEVOTHYROXINE SODIUM 125 MCG: 125 TABLET ORAL at 08:55

## 2023-12-31 RX ADMIN — ACETAMINOPHEN 1000 MG: 500 TABLET, FILM COATED ORAL at 05:49

## 2023-12-31 RX ADMIN — ACETYLCYSTEINE 2 ML: 200 SOLUTION ORAL; RESPIRATORY (INHALATION) at 15:34

## 2023-12-31 RX ADMIN — ASPIRIN 81 MG: 81 TABLET, COATED ORAL at 08:55

## 2023-12-31 RX ADMIN — CYCLOBENZAPRINE 10 MG: 10 TABLET, FILM COATED ORAL at 23:40

## 2023-12-31 RX ADMIN — TAMSULOSIN HYDROCHLORIDE 0.4 MG: 0.4 CAPSULE ORAL at 08:55

## 2023-12-31 RX ADMIN — PIPERACILLIN AND TAZOBACTAM 3.38 G: 3; .375 INJECTION, POWDER, FOR SOLUTION INTRAVENOUS at 23:33

## 2023-12-31 RX ADMIN — PIPERACILLIN AND TAZOBACTAM 3.38 G: 3; .375 INJECTION, POWDER, FOR SOLUTION INTRAVENOUS at 12:33

## 2023-12-31 RX ADMIN — IPRATROPIUM BROMIDE AND ALBUTEROL SULFATE 3 ML: .5; 3 SOLUTION RESPIRATORY (INHALATION) at 07:32

## 2023-12-31 RX ADMIN — METOPROLOL SUCCINATE 50 MG: 50 TABLET, EXTENDED RELEASE ORAL at 20:44

## 2023-12-31 RX ADMIN — AMLODIPINE BESYLATE 5 MG: 5 TABLET ORAL at 23:34

## 2023-12-31 ASSESSMENT — ACTIVITIES OF DAILY LIVING (ADL)
ADLS_ACUITY_SCORE: 37

## 2023-12-31 NOTE — PROGRESS NOTES
[facility]    Medicine Progress Note - Hospitalist Service        Date of Admission:  12/6/2023  5:45 PM    Assessment & Plan:   Harrison Thomas is a 78 year old male with medical history significant for chronic hypoxic respiratory failure [has oxygen as needed at home], COPD, RUL lung mass s/p radiation, untreated BRENNEN, HTN, HLD, CAD, hypothyroidism admitted on 12/6/2023 with acute on chronic hypoxic respiratory failure likely 2/2 COPD exacerbation.      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  BRENNEN   Pulmonary edema  *Follows with Dr. Handy with pulmonology at [facility], last seen in September 2023.  On low dose chronic prednisone (2mg).  *Patient presented to the ED with increased shortness of breath over the past week, increased oxygen use at home.  *Admission clinical picture consistent with COPD exacerbation - poor air movement with diffuse expiratory wheezing - placed on steroids and nebs.  *BNP and procalcitonin wnl, COVID/flu/RSV negative on admission, resp viral panel negative (12/15).  *CT pulm angio 12/11 negative for PE, shows mild bronchial wall thickening and extensive emphysema.  *Underwent sniff test this admission that was negative.  *Received 5-day treatment course azithromycin at admission, resumed  mg every other day dosing for prophylaxis.  *Intermittently has required high flow nasal cannula and BiPAP  *[facility] pulmonary following.  *Transitioned from IV solumedrol to prednisone 12/20 taper.  Currently on 30 mg daily.  -Continue pantoprazole until tapered back to PTA dose of prednisone.  -Duonebs qid.  -Home inhaler on hold.  -Completed 10 days of IV Zosyn on 12/31/2024.  -Respiratory culture obtained 12/22/23, grew 1+ Candida albicans.  -[facility] pulmonary following, discussed with Dr. Connor on 12/29.  Given empiric trial with IV Lasix x 3 doses without  significant subjective improvement although objectively his oxygenation is improving.    -As per their recommendation we will try MetaNebs today, discussed with RT  -Hold p.o. Lasix for today as sodium is going up, anticipate resuming tomorrow morning  -Previous hospitalist discussed overall goals of care with the patient on 12/26/2023, plan was to continue restorative care.  I had a long discussion with daughter Janey on the phone this morning and updated her.  I expressed my worry about his overall clinical status particularly pulmonary wise and emphasized my guarded prognosis in the medium to long-term.  Patient does not want to go to TCU therefore he will be very high risk for readmission given his medical comorbidities and the circumstances.  -Continue to wean oxygen as able.    Hypernatremia  -Sodium slightly up to 147 today, suspect due to diuretics and decreased free water intake  -Avoid IV fluids, encourage free water intake by mouth  -Recheck BMP in the morning     Hypertension   Hyperlipidemia  Coronary artery disease  -Continue PTA metoprolol, amlodipine, aspirin, rosuvastatin     Chronic low back pain   *Patient is supposed to be getting a lumbar MRI soon.  Can be done as an outpatient.  -Continue PTA oxycodone.  -Gabapentin changed from 600 mg bid to 400 mg tid, continue.  -Hold naproxen for now in the setting of high-dose steroids since he would be at high risk for upper GI bleed and ulcers.  -Oral PPI for prophylaxis.  -Continue PRN Flexeril.  -Scheduled Tylenol tid.  -Lidocaine patches.     Constipation, resolved  *Given concern for abdominal distention, obtained abdominal x-ray on 12/18 that still showed large amount of stool throughout the colon.  Started having bowel movement on 12/19/23 and has been having at least daily bowel movements since then.  -Continue on aggressive bowel regimen.     Acute urinary retention-resolved  *Required straight cath x3 12/20-12/21 and martinez placed.  -Started  "flomax 12/21/23.  -successful trial of voiding on 12/23/23.   -Continue to monitor for retention following catheter removal.  -Aggressively treat constipation as above.     Hx of papillary thyroid cancer s/p thyroidectomy   Hypothyroidism   -Continue PTA levothyroxine.     Insomnia   -Continue PTA Trazodone.     Leukocytosis  -Related to steroids and pneumonia.  -WBC back to within normal limits as of 12/23/23.     Thrombocytopenia, resolved  Platelets in low 100s.  Not on any heparin products on admission through 12/20.  He is on baby aspirin twice daily, no signs of bleeding.  -Platelets 134 on 12/16/23, slowly trended down but now resolved  -Lovenox prophylaxis started 12/21/23 as platelets have trended up and are back to normal.     Bilateral pulmonary nodules  CT chest 12/11 showing scattered small bilateral pulmonary nodules measuring up to 5 mm are unchanged.  -Continued surveillance recommended.          Diet: Regular Diet Adult     DVT Prophylaxis: Enoxaparin (Lovenox) SQ   Mir Catheter: Not present  Code Status: No CPR- Do NOT Intubate     Disposition Plan      Expected Discharge Date: 1/1/24      Discharge Comments: pt is on iv solumedrol      Entered: John Rosenbaum MD 12/31/2023, 8:39 AM        Clinically Significant Risk Factors        # Hypokalemia: Lowest K = 3.1 mmol/L in last 2 days, will replace as needed       # Hypoalbuminemia: Lowest albumin = 3.4 g/dL at 12/8/2023 11:58 AM, will monitor as appropriate     # Hypertension: Noted on problem list        # Overweight: Estimated body mass index is 25.08 kg/m  as calculated from the following:    Height as of this encounter: 1.778 m (5' 10\").    Weight as of this encounter: 79.3 kg (174 lb 12.8 oz).                  The patient's care was discussed with the Bedside Nurse and Patient.    Medical Decision Making       **CLEAR ALL SELECTIONS**      Labs/Imaging Reviewed:  See Information above and Data section below  Time SPENT BY ME on the date of " "service doing chart review, history, exam, documentation & further activities per the note:  52 MINUTES    Chart documentation was completed, in part, with [employer] voice-recognition software. Even though reviewed, some grammatical, spelling, and word errors may remain.    John Rosenbaum MD  Hospitalist Service  [facility]  Text Page 7AM-6PM  Securely message with the Vocera Web Console (learn more here)  Text page via [employer] Paging/Directory    ______________________________________________________________________    Interval History   Patient subjectively feels about the same, dyspneic with minimal activity.  Oxygenation is better at 1 L.  Endorses cough which is unchanged.  Afebrile.    Data reviewed today: I reviewed all medications, new labs and imaging results over the last 24 hours. I personally reviewed no images or EKG's today.    Physical Exam   Vital signs:  Temp: 97.9  F (36.6  C) Temp src: Oral BP: (!) 151/79 Pulse: 58   Resp: 18 SpO2: 95 % O2 Device: Nasal cannula Oxygen Delivery: 1 LPM Height: 177.8 cm (5' 10\") Weight: 79.3 kg (174 lb 12.8 oz)  Estimated body mass index is 25.08 kg/m  as calculated from the following:    Height as of this encounter: 1.778 m (5' 10\").    Weight as of this encounter: 79.3 kg (174 lb 12.8 oz).      Wt Readings from Last 2 Encounters:   12/29/23 79.3 kg (174 lb 12.8 oz)   12/01/23 83 kg (183 lb)       Gen: AAOX3, NAD, comfortable  HEENT: Supple neck, moist oral mucosa, no pallor  Resp: Coarse breath sounds bilaterally, normal effort of breathing  CVS: RRR, no murmur  Abd/GI: Soft, non-tender. BS- normoactive.  Skin: Warm, dry no rashes  MSK: no pedal edema  Neuro- CN- intact. No focal deficits.        No results found for this or any previous visit (from the past 24 hour(s)).  Medications    - MEDICATION INSTRUCTIONS -      - MEDICATION INSTRUCTIONS -        acetaminophen  1,000 mg Oral Q8H    acetylcysteine  2 mL Nebulization Q4H    amLODIPine  " 5 mg Oral At Bedtime    aspirin  81 mg Oral BID    azithromycin  500 mg Oral Every Other Day    DULoxetine  20 mg Oral BID    enoxaparin ANTICOAGULANT  40 mg Subcutaneous Q24H    [Held by provider] Fluticasone-Umeclidin-Vilant  1 puff Inhalation Daily    [Held by provider] furosemide  40 mg Oral Daily    gabapentin  400 mg Oral TID    insulin aspart  1-7 Units Subcutaneous TID AC    insulin aspart  1-5 Units Subcutaneous At Bedtime    ipratropium - albuterol 0.5 mg/2.5 mg/3 mL  3 mL Nebulization 4x daily    levothyroxine  125 mcg Oral Daily    lidocaine  2 patch Transdermal Q24H    metoprolol succinate ER  50 mg Oral QPM    pantoprazole  40 mg Oral QAM AC    piperacillin-tazobactam  3.375 g Intravenous Q6H    polyethylene glycol  17 g Oral BID    predniSONE  30 mg Oral Daily    Followed by    [START ON 1/3/2024] predniSONE  20 mg Oral Daily    Followed by    [START ON 1/10/2024] predniSONE  10 mg Oral Daily    Followed by    [START ON 1/17/2024] predniSONE  5 mg Oral Daily    rosuvastatin  10 mg Oral QPM    senna-docusate  2 tablet Oral BID    sodium chloride (PF)  3 mL Intracatheter Q8H    tamsulosin  0.4 mg Oral Daily    traZODone  50 mg Oral At Bedtime

## 2023-12-31 NOTE — PLAN OF CARE
Goal Outcome Evaluation:      Plan of Care Reviewed With: patient  Summary: acute on chronic hypoxic respiratory failure d/t COPD exacerbation.   DATE & TIME: 12/30/23, 3804-2013  Cognitive Concerns/ Orientation : A/Ox4, forgetful   BEHAVIOR & AGGRESSION TOOL COLOR: Green   ABNL VS/O2: VSS on 2-3L NC, needs more O2 with exertional activity. Goal > 88%, has remained stable ranging 88-99%. Significant ACEVES with expiratory wheezes/coarse crackles. On scheduled nebs.   MOBILITY: SBA with walker and oxygen, up to chair during the day.  PAIN MANAGMENT: low back pain managed with PRN Oxycodone and flexeril, and scheduled Tylenol  DIET: Regular, tolerating  BOWEL/BLADDER: Continent, good UOP.  ABNL LAB/BG: , 171 . K 3.7-> am check DRAIN/DEVICES: R PIV SL  SKIN: Dusky complexion. Scattered bruising and scabs, jose j bilateral lower extremities. Abrasion to R. Knee.   TESTS/PROCEDURES: none this shift  D/C DATE: Pending improvement  OTHER IMPORTANT INFO: Pulmonology/RT following. Started on 40mg IV lasix twice daily, On Zosyn Q6 for pneumonia. Home O2 assessment completed 12/29

## 2023-12-31 NOTE — PLAN OF CARE
Goal Outcome Evaluation:       Summary: acute on chronic hypoxic respiratory failure d/t COPD exacerbation.   DATE & TIME: 12/30/23, 4925-2044  Cognitive Concerns/ Orientation : A/Ox4, forgetful   BEHAVIOR & AGGRESSION TOOL COLOR: Green   ABNL VS/O2: VSS on 2-3L NC, needs more O2 with exertional activity. Goal > 88%, has remained stable ranging 88-99%. Significant ACEVES with expiratory wheezes/coarse crackles. On scheduled nebs.   MOBILITY: SBA with walker and oxygen, up to chair during the day.  PAIN MANAGMENT: low back pain managed with PRN Oxycodone and flexeril, and scheduled Tylenol  DIET: Regular, tolerating  BOWEL/BLADDER: Continent, good UOP.  ABNL LAB/BG:  . K 3.7-> am check DRAIN/DEVICES: R PIV SL  SKIN: Dusky complexion. Scattered bruising and scabs, jose j bilateral lower extremities. Abrasion to R. Knee.   TESTS/PROCEDURES: none this shift  D/C DATE: Pending improvement  OTHER IMPORTANT INFO: Pulmonology/RT following. Started on 40mg IV lasix twice daily, On Zosyn Q6 for pneumonia. Home O2 assessment completed on 12/29                    - - -

## 2024-01-01 ENCOUNTER — APPOINTMENT (OUTPATIENT)
Dept: GENERAL RADIOLOGY | Facility: CLINIC | Age: 79
DRG: 190 | End: 2024-01-01
Attending: INTERNAL MEDICINE
Payer: MEDICARE

## 2024-01-01 LAB
ANION GAP SERPL CALCULATED.3IONS-SCNC: 8 MMOL/L (ref 7–15)
BUN SERPL-MCNC: 12.1 MG/DL (ref 8–23)
CALCIUM SERPL-MCNC: 9.1 MG/DL (ref 8.8–10.2)
CHLORIDE SERPL-SCNC: 100 MMOL/L (ref 98–107)
CREAT SERPL-MCNC: 0.81 MG/DL (ref 0.67–1.17)
DEPRECATED HCO3 PLAS-SCNC: 34 MMOL/L (ref 22–29)
EGFRCR SERPLBLD CKD-EPI 2021: >90 ML/MIN/1.73M2
GLUCOSE BLDC GLUCOMTR-MCNC: 114 MG/DL (ref 70–99)
GLUCOSE BLDC GLUCOMTR-MCNC: 140 MG/DL (ref 70–99)
GLUCOSE BLDC GLUCOMTR-MCNC: 175 MG/DL (ref 70–99)
GLUCOSE BLDC GLUCOMTR-MCNC: 182 MG/DL (ref 70–99)
GLUCOSE BLDC GLUCOMTR-MCNC: 81 MG/DL (ref 70–99)
GLUCOSE SERPL-MCNC: 173 MG/DL (ref 70–99)
PLATELET # BLD AUTO: 268 10E3/UL (ref 150–450)
POTASSIUM SERPL-SCNC: 4 MMOL/L (ref 3.4–5.3)
SODIUM SERPL-SCNC: 142 MMOL/L (ref 135–145)

## 2024-01-01 PROCEDURE — 36415 COLL VENOUS BLD VENIPUNCTURE: CPT | Performed by: INTERNAL MEDICINE

## 2024-01-01 PROCEDURE — 250N000013 HC RX MED GY IP 250 OP 250 PS 637: Performed by: HOSPITALIST

## 2024-01-01 PROCEDURE — 250N000013 HC RX MED GY IP 250 OP 250 PS 637: Performed by: INTERNAL MEDICINE

## 2024-01-01 PROCEDURE — 80048 BASIC METABOLIC PNL TOTAL CA: CPT | Performed by: INTERNAL MEDICINE

## 2024-01-01 PROCEDURE — 250N000009 HC RX 250: Performed by: STUDENT IN AN ORGANIZED HEALTH CARE EDUCATION/TRAINING PROGRAM

## 2024-01-01 PROCEDURE — 250N000011 HC RX IP 250 OP 636: Performed by: INTERNAL MEDICINE

## 2024-01-01 PROCEDURE — 85049 AUTOMATED PLATELET COUNT: CPT | Performed by: STUDENT IN AN ORGANIZED HEALTH CARE EDUCATION/TRAINING PROGRAM

## 2024-01-01 PROCEDURE — 250N000013 HC RX MED GY IP 250 OP 250 PS 637: Performed by: STUDENT IN AN ORGANIZED HEALTH CARE EDUCATION/TRAINING PROGRAM

## 2024-01-01 PROCEDURE — 94640 AIRWAY INHALATION TREATMENT: CPT | Mod: 76

## 2024-01-01 PROCEDURE — 250N000011 HC RX IP 250 OP 636: Performed by: HOSPITALIST

## 2024-01-01 PROCEDURE — 120N000001 HC R&B MED SURG/OB

## 2024-01-01 PROCEDURE — 999N000157 HC STATISTIC RCP TIME EA 10 MIN

## 2024-01-01 PROCEDURE — 71045 X-RAY EXAM CHEST 1 VIEW: CPT

## 2024-01-01 PROCEDURE — 94799 UNLISTED PULMONARY SVC/PX: CPT

## 2024-01-01 PROCEDURE — 250N000012 HC RX MED GY IP 250 OP 636 PS 637: Performed by: STUDENT IN AN ORGANIZED HEALTH CARE EDUCATION/TRAINING PROGRAM

## 2024-01-01 PROCEDURE — 250N000009 HC RX 250: Performed by: HOSPITALIST

## 2024-01-01 PROCEDURE — 94640 AIRWAY INHALATION TREATMENT: CPT

## 2024-01-01 PROCEDURE — 99233 SBSQ HOSP IP/OBS HIGH 50: CPT | Performed by: INTERNAL MEDICINE

## 2024-01-01 RX ORDER — FUROSEMIDE 10 MG/ML
40 INJECTION INTRAMUSCULAR; INTRAVENOUS ONCE
Status: COMPLETED | OUTPATIENT
Start: 2024-01-01 | End: 2024-01-01

## 2024-01-01 RX ADMIN — ASPIRIN 81 MG: 81 TABLET, COATED ORAL at 20:26

## 2024-01-01 RX ADMIN — IPRATROPIUM BROMIDE AND ALBUTEROL SULFATE 3 ML: .5; 3 SOLUTION RESPIRATORY (INHALATION) at 20:38

## 2024-01-01 RX ADMIN — ACETYLCYSTEINE 2 ML: 200 SOLUTION ORAL; RESPIRATORY (INHALATION) at 08:03

## 2024-01-01 RX ADMIN — ASPIRIN 81 MG: 81 TABLET, COATED ORAL at 09:02

## 2024-01-01 RX ADMIN — ACETYLCYSTEINE 2 ML: 200 SOLUTION ORAL; RESPIRATORY (INHALATION) at 14:08

## 2024-01-01 RX ADMIN — IPRATROPIUM BROMIDE AND ALBUTEROL SULFATE 3 ML: .5; 3 SOLUTION RESPIRATORY (INHALATION) at 23:52

## 2024-01-01 RX ADMIN — ACETAMINOPHEN 1000 MG: 500 TABLET, FILM COATED ORAL at 05:49

## 2024-01-01 RX ADMIN — ROSUVASTATIN CALCIUM 10 MG: 10 TABLET, FILM COATED ORAL at 20:27

## 2024-01-01 RX ADMIN — ENOXAPARIN SODIUM 40 MG: 40 INJECTION SUBCUTANEOUS at 10:46

## 2024-01-01 RX ADMIN — METOPROLOL SUCCINATE 50 MG: 50 TABLET, EXTENDED RELEASE ORAL at 20:27

## 2024-01-01 RX ADMIN — GABAPENTIN 400 MG: 100 CAPSULE ORAL at 09:01

## 2024-01-01 RX ADMIN — LEVOTHYROXINE SODIUM 125 MCG: 125 TABLET ORAL at 09:02

## 2024-01-01 RX ADMIN — ACETYLCYSTEINE 2 ML: 200 SOLUTION ORAL; RESPIRATORY (INHALATION) at 20:38

## 2024-01-01 RX ADMIN — GABAPENTIN 400 MG: 100 CAPSULE ORAL at 17:21

## 2024-01-01 RX ADMIN — FUROSEMIDE 40 MG: 10 INJECTION, SOLUTION INTRAMUSCULAR; INTRAVENOUS at 10:46

## 2024-01-01 RX ADMIN — IPRATROPIUM BROMIDE AND ALBUTEROL SULFATE 3 ML: .5; 3 SOLUTION RESPIRATORY (INHALATION) at 04:09

## 2024-01-01 RX ADMIN — ACETAMINOPHEN 1000 MG: 500 TABLET, FILM COATED ORAL at 22:11

## 2024-01-01 RX ADMIN — IPRATROPIUM BROMIDE AND ALBUTEROL SULFATE 3 ML: .5; 3 SOLUTION RESPIRATORY (INHALATION) at 14:08

## 2024-01-01 RX ADMIN — ACETAMINOPHEN 1000 MG: 500 TABLET, FILM COATED ORAL at 14:55

## 2024-01-01 RX ADMIN — DULOXETINE HYDROCHLORIDE 20 MG: 20 CAPSULE, DELAYED RELEASE ORAL at 09:02

## 2024-01-01 RX ADMIN — PREDNISONE 30 MG: 20 TABLET ORAL at 09:02

## 2024-01-01 RX ADMIN — IPRATROPIUM BROMIDE AND ALBUTEROL SULFATE 3 ML: .5; 3 SOLUTION RESPIRATORY (INHALATION) at 08:03

## 2024-01-01 RX ADMIN — ACETYLCYSTEINE 2 ML: 200 SOLUTION ORAL; RESPIRATORY (INHALATION) at 04:09

## 2024-01-01 RX ADMIN — TRAZODONE HYDROCHLORIDE 50 MG: 50 TABLET ORAL at 22:12

## 2024-01-01 RX ADMIN — DULOXETINE HYDROCHLORIDE 20 MG: 20 CAPSULE, DELAYED RELEASE ORAL at 20:27

## 2024-01-01 RX ADMIN — AMLODIPINE BESYLATE 5 MG: 5 TABLET ORAL at 22:12

## 2024-01-01 RX ADMIN — IPRATROPIUM BROMIDE AND ALBUTEROL SULFATE 3 ML: .5; 3 SOLUTION RESPIRATORY (INHALATION) at 00:10

## 2024-01-01 RX ADMIN — AZITHROMYCIN DIHYDRATE 500 MG: 250 TABLET ORAL at 09:02

## 2024-01-01 RX ADMIN — INSULIN ASPART 1 UNITS: 100 INJECTION, SOLUTION INTRAVENOUS; SUBCUTANEOUS at 15:29

## 2024-01-01 RX ADMIN — TAMSULOSIN HYDROCHLORIDE 0.4 MG: 0.4 CAPSULE ORAL at 09:02

## 2024-01-01 RX ADMIN — ACETYLCYSTEINE 2 ML: 200 SOLUTION ORAL; RESPIRATORY (INHALATION) at 00:09

## 2024-01-01 RX ADMIN — GABAPENTIN 400 MG: 100 CAPSULE ORAL at 22:11

## 2024-01-01 RX ADMIN — PANTOPRAZOLE SODIUM 40 MG: 40 TABLET, DELAYED RELEASE ORAL at 05:49

## 2024-01-01 RX ADMIN — IPRATROPIUM BROMIDE AND ALBUTEROL SULFATE 3 ML: .5; 3 SOLUTION RESPIRATORY (INHALATION) at 11:34

## 2024-01-01 RX ADMIN — ACETYLCYSTEINE 2 ML: 200 SOLUTION ORAL; RESPIRATORY (INHALATION) at 23:53

## 2024-01-01 RX ADMIN — INSULIN ASPART 1 UNITS: 100 INJECTION, SOLUTION INTRAVENOUS; SUBCUTANEOUS at 18:50

## 2024-01-01 RX ADMIN — ACETYLCYSTEINE 2 ML: 200 SOLUTION ORAL; RESPIRATORY (INHALATION) at 11:34

## 2024-01-01 ASSESSMENT — ACTIVITIES OF DAILY LIVING (ADL)
ADLS_ACUITY_SCORE: 37
ADLS_ACUITY_SCORE: 36
ADLS_ACUITY_SCORE: 37
ADLS_ACUITY_SCORE: 36
ADLS_ACUITY_SCORE: 37
ADLS_ACUITY_SCORE: 37
ADLS_ACUITY_SCORE: 36
ADLS_ACUITY_SCORE: 36
ADLS_ACUITY_SCORE: 37

## 2024-01-01 NOTE — PROGRESS NOTES
[facility]    Medicine Progress Note - Hospitalist Service        Date of Admission:  12/6/2023  5:45 PM    Assessment & Plan:   Harrison Thomas is a 78 year old male with medical history significant for chronic hypoxic respiratory failure [has oxygen as needed at home], COPD, RUL lung mass s/p radiation, untreated BRENNEN, HTN, HLD, CAD, hypothyroidism admitted on 12/6/2023 with acute on chronic hypoxic respiratory failure likely 2/2 COPD exacerbation.      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  BRENNEN   Pulmonary edema  *Follows with Dr. Handy with pulmonology at [facility], last seen in September 2023.  On low dose chronic prednisone (2mg).  *Patient presented to the ED with increased shortness of breath over the past week, increased oxygen use at home.  *Admission clinical picture consistent with COPD exacerbation - poor air movement with diffuse expiratory wheezing - placed on steroids and nebs.  *BNP and procalcitonin wnl, COVID/flu/RSV negative on admission, resp viral panel negative (12/15).  *CT pulm angio 12/11 negative for PE, shows mild bronchial wall thickening and extensive emphysema.  *Underwent sniff test this admission that was negative.  *Received 5-day treatment course azithromycin at admission, resumed  mg every other day dosing for prophylaxis.  *Intermittently has required high flow nasal cannula and BiPAP  *[facility] pulmonary following.  *Transitioned from IV solumedrol to prednisone 12/20.  -Continue pantoprazole until tapered back to PTA dose of prednisone.  -Duonebs qid.  -Home inhaler on hold.  -Respiratory culture obtained 12/22/23, grew 1+ Candida albicans.  -[facility] pulmonary following, discussed with Dr. Connor on 12/29.  Has received empiric intermittent IV diuretics for potential volume overload with inconsistent results.  -Hypoxia improving,  patient down to 1 L oxygen via nasal cannula however continues to be severely short of breath with minimal activity  -As per pulmonary recommendations started on MetaNebs yesterday, RT following  -Had a long discussion with the daughter on the phone yesterday and with the patient today at the bedside.  He is still opposed to going to transitional care.  He has had a prolonged hospitalization and respiratory status is still not stable.  He is going to be superhigh risk for readmission with his tenuous pulmonary status.  I discussed palliative care evaluation which he is thinking about however he would like to discuss with pulmonary tomorrow to see if there are any other options for improvement.  -Would appreciate reevaluation by pulmonary tomorrow to discuss any further options for respiratory improvement and goals of care.  -Clinical exam concerning for volume overload again, he is very crackly.  Will give Lasix 40 mg IV x 1  -Repeat chest x-ray today.    Hypernatremia  -Sodium slightly up to 147 today, suspect due to diuretics and decreased free water intake  -Still awaiting labs from this morning.    Hypertension   Hyperlipidemia  Coronary artery disease  -Continue PTA metoprolol, amlodipine, aspirin, rosuvastatin     Chronic low back pain   *Patient is supposed to be getting a lumbar MRI soon.  Can be done as an outpatient.  -Continue PTA oxycodone.  -Gabapentin changed from 600 mg bid to 400 mg tid, continue.  -Hold naproxen for now in the setting of high-dose steroids since he would be at high risk for upper GI bleed and ulcers.  -Oral PPI for prophylaxis.  -Continue PRN Flexeril.  -Scheduled Tylenol tid.  -Lidocaine patches.     Constipation, resolved  *Given concern for abdominal distention, obtained abdominal x-ray on 12/18 that still showed large amount of stool throughout the colon.  Started having bowel movement on 12/19/23 and has been having at least daily bowel movements since then.  -Continue on  aggressive bowel regimen.     Acute urinary retention-resolved  *Required straight cath x3 12/20-12/21 and martinez placed.  -Started flomax 12/21/23.  -successful trial of voiding on 12/23/23.   -Continue to monitor for retention following catheter removal.  -Aggressively treat constipation as above.     Hx of papillary thyroid cancer s/p thyroidectomy   Hypothyroidism   -Continue PTA levothyroxine.     Insomnia   -Continue PTA Trazodone.     Leukocytosis  -Related to steroids and pneumonia.  -WBC back to normal.    Thrombocytopenia, resolved  Platelets in low 100s.  Not on any heparin products on admission through 12/20.  He is on baby aspirin twice daily, no signs of bleeding.  -Platelets 134 on 12/16/23, slowly trended down but now resolved  -Lovenox prophylaxis started 12/21/23 as platelets have trended up and are back to normal.     Bilateral pulmonary nodules  CT chest 12/11 showing scattered small bilateral pulmonary nodules measuring up to 5 mm are unchanged.  -Continued surveillance recommended.          Diet: Regular Diet Adult     DVT Prophylaxis: Enoxaparin (Lovenox) SQ   Martinez Catheter: Not present  Code Status: No CPR- Do NOT Intubate     Disposition Plan      Expected Discharge Date: Unclear at this time, patient has not stabilized sufficiently from a respiratory standpoint, still gets very dyspneic with very minimal activity.  Awaiting reevaluation by pulmonary on 1/2 for further direction/discussion about goals of care.  Discharge Comments: pt is on iv solumedrol      Entered: John Rosenbaum MD 01/01/2024, 10:11 AM        Clinically Significant Risk Factors        # Hypokalemia: Lowest K = 3.1 mmol/L in last 2 days, will replace as needed  # Hypernatremia: Highest Na = 147 mmol/L in last 2 days, will monitor as appropriate      # Hypoalbuminemia: Lowest albumin = 3.4 g/dL at 12/8/2023 11:58 AM, will monitor as appropriate     # Hypertension: Noted on problem list        # Overweight: Estimated body  "mass index is 25.08 kg/m  as calculated from the following:    Height as of this encounter: 1.778 m (5' 10\").    Weight as of this encounter: 79.3 kg (174 lb 12.8 oz).                  The patient's care was discussed with the Bedside Nurse and Patient.    Medical Decision Making       **CLEAR ALL SELECTIONS**      Labs/Imaging Reviewed:  See Information above and Data section below  Time SPENT BY ME on the date of service doing chart review, history, exam, documentation & further activities per the note:  50 MINUTES    Chart documentation was completed, in part, with [employer] voice-recognition software. Even though reviewed, some grammatical, spelling, and word errors may remain.    John Rosenbaum MD  Hospitalist Service  [facility]  Text Page 7AM-6PM  Securely message with the Vocera Web Console (learn more here)  Text page via [employer] Paging/Directory    ______________________________________________________________________    Interval History   Patient feels about the same.  Started on MetaNebs yesterday and subjectively feels better but he is very rattly today.  Had a long discussion with him at the bedside in the presence of his RN.  He is still very opposed to going to TCU.  Discussed palliative care which she was not totally opposed to but would like to hear pulmonary opinion tomorrow before considering that.    Data reviewed today: I reviewed all medications, new labs and imaging results over the last 24 hours. I personally reviewed no images or EKG's today.    Physical Exam   Vital signs:  Temp: 98.1  F (36.7  C) Temp src: Oral BP: (!) 159/87 Pulse: 70   Resp: 18 SpO2: 96 % O2 Device: Nasal cannula Oxygen Delivery: 1 LPM Height: 177.8 cm (5' 10\") Weight: 79.3 kg (174 lb 12.8 oz)  Estimated body mass index is 25.08 kg/m  as calculated from the following:    Height as of this encounter: 1.778 m (5' 10\").    Weight as of this encounter: 79.3 kg (174 lb 12.8 oz).      Wt Readings from " Last 2 Encounters:   12/29/23 79.3 kg (174 lb 12.8 oz)   12/01/23 83 kg (183 lb)       Gen: AAOX3, NAD, comfortable  HEENT: Supple neck, moist oral mucosa, no pallor  Resp: Coarse crackles bilaterally in all the lung fields which is also externally audible, tachypneic.  CVS: RRR, no murmur  Abd/GI: Soft, non-tender. BS- normoactive.  Skin: Warm, dry no rashes  MSK: no pedal edema  Neuro- CN- intact. No focal deficits.        Recent Results (from the past 24 hour(s))   XR Chest Port 1 View    Narrative    EXAM: XR CHEST PORT 1 VIEW  LOCATION: [facility]  DATE: 1/1/2024    INDICATION: PNA f u  COMPARISON: Chest radiograph 12/23/2023. CT chest 12/21/2023.      Impression    IMPRESSION: Nearly resolved right upper lobe opacity, and improved bibasilar opacities consistent with improving infection/inflammation. Cardiomediastinal silhouette is unremarkable.                 Medications    - MEDICATION INSTRUCTIONS -      - MEDICATION INSTRUCTIONS -        acetaminophen  1,000 mg Oral Q8H    acetylcysteine  2 mL Nebulization Q4H    amLODIPine  5 mg Oral At Bedtime    aspirin  81 mg Oral BID    azithromycin  500 mg Oral Every Other Day    DULoxetine  20 mg Oral BID    enoxaparin ANTICOAGULANT  40 mg Subcutaneous Q24H    [Held by provider] Fluticasone-Umeclidin-Vilant  1 puff Inhalation Daily    furosemide  40 mg Intravenous Once    [Held by provider] furosemide  40 mg Oral Daily    gabapentin  400 mg Oral TID    insulin aspart  1-7 Units Subcutaneous TID AC    insulin aspart  1-5 Units Subcutaneous At Bedtime    ipratropium - albuterol 0.5 mg/2.5 mg/3 mL  3 mL Nebulization 4x daily    levothyroxine  125 mcg Oral Daily    lidocaine  2 patch Transdermal Q24H    metoprolol succinate ER  50 mg Oral QPM    pantoprazole  40 mg Oral QAM AC    polyethylene glycol  17 g Oral BID    predniSONE  30 mg Oral Daily    Followed by    [START ON 1/3/2024] predniSONE  20 mg Oral Daily    Followed by    [START ON  1/10/2024] predniSONE  10 mg Oral Daily    Followed by    [START ON 1/17/2024] predniSONE  5 mg Oral Daily    rosuvastatin  10 mg Oral QPM    senna-docusate  2 tablet Oral BID    sodium chloride (PF)  3 mL Intracatheter Q8H    tamsulosin  0.4 mg Oral Daily    traZODone  50 mg Oral At Bedtime

## 2024-01-01 NOTE — PLAN OF CARE
Goal Outcome Evaluation:    DATE & TIME: 1/1/23 [id number] [id number]  Cognitive Concerns/ Orientation : A/Ox4, forgetful   BEHAVIOR & AGGRESSION TOOL COLOR: Green   ABNL VS/O2: VSS on 1-3L NC, needs more O2 with exertional activity. Goal > 88%,  Significant ACEVES with expiratory wheezes, course LS. On scheduled nebs. Chest physiotherapy started 12/31  MOBILITY: SBA with walker and oxygen.  PAIN MANAGMENT: low back pain managed with PRN Oxycodone and flexeril, and scheduled Tylenol-given this shift.  DIET: Regular, tolerating  BOWEL/BLADDER: Continent  ABNL LAB/BG: BG 81, 140, 182   DRAIN/DEVICES: R PIV SL  SKIN: Scattered bruising and scabs, jose j bilateral lower extremities. Abrasion to R. Knee.   TESTS/PROCEDURES: none this shift  D/C DATE: Pending improvement  OTHER IMPORTANT INFO: Pulmonology/RT following. Oral antibiotics started today. Home O2 assessment completed on 12/29

## 2024-01-01 NOTE — PLAN OF CARE
Summary: acute on chronic hypoxic respiratory failure d/t COPD exacerbation.   DATE & TIME: 12/31/23, NOC  Cognitive Concerns/ Orientation : A/Ox4, forgetful   BEHAVIOR & AGGRESSION TOOL COLOR: Green   ABNL VS/O2: VSS on 2-3L NC, needs more O2 with exertional activity. Goal > 88%,  Significant ACEVES with expiratory wheezes. On scheduled nebs. Chest physiotherapy started yesterday  MOBILITY: SBA with walker and oxygen.  PAIN MANAGMENT: low back pain managed with PRN Oxycodone and flexeril, and scheduled Tylenol-given this shift.  DIET: Regular, tolerating  BOWEL/BLADDER: Continent, good UOP.  ABNL LAB/BG: ,114. K WDL  DRAIN/DEVICES: R PIV SL-pt will need new IV placement.  SKIN: Dusky complexion. Scattered bruising and scabs, jose j bilateral lower extremities. Abrasion to R. Knee.   TESTS/PROCEDURES: none this shift  D/C DATE: Pending improvement  OTHER IMPORTANT INFO: Pulmonology/RT following. On Zosyn Q6 for pneumonia. Home O2 assessment completed on 12/29

## 2024-01-01 NOTE — PLAN OF CARE
Goal Outcome Evaluation:  DATE & TIME: 12/31/23, [id number]  Cognitive Concerns/ Orientation : A/Ox4, forgetful   BEHAVIOR & AGGRESSION TOOL COLOR: Green   ABNL VS/O2: VSS on 2-3L NC, needs more O2 with exertional activity. Goal > 88%,  Significant ACEVES with expiratory wheezes. On scheduled nebs. Chest physiotherapy started today  MOBILITY: SBA with walker and oxygen, up to chair during the day.  PAIN MANAGMENT: low back pain managed with PRN Oxycodone and flexeril, and scheduled Tylenol  DIET: Regular, tolerating  BOWEL/BLADDER: Continent, good UOP.  ABNL LAB/BG: BG 78, 131, 108 . K 3.3 ->replaced next check 1630  DRAIN/DEVICES: R PIV SL  SKIN: Dusky complexion. Scattered bruising and scabs, jose j bilateral lower extremities. Abrasion to R. Knee.   TESTS/PROCEDURES: none this shift  D/C DATE: Pending improvement  OTHER IMPORTANT INFO: Pulmonology/RT following. On Zosyn Q6

## 2024-01-02 ENCOUNTER — APPOINTMENT (OUTPATIENT)
Dept: PHYSICAL THERAPY | Facility: CLINIC | Age: 79
DRG: 190 | End: 2024-01-02
Payer: MEDICARE

## 2024-01-02 LAB
GLUCOSE BLDC GLUCOMTR-MCNC: 122 MG/DL (ref 70–99)
GLUCOSE BLDC GLUCOMTR-MCNC: 148 MG/DL (ref 70–99)
GLUCOSE BLDC GLUCOMTR-MCNC: 154 MG/DL (ref 70–99)
GLUCOSE BLDC GLUCOMTR-MCNC: 163 MG/DL (ref 70–99)
GLUCOSE BLDC GLUCOMTR-MCNC: 86 MG/DL (ref 70–99)
POTASSIUM SERPL-SCNC: 3.8 MMOL/L (ref 3.4–5.3)

## 2024-01-02 PROCEDURE — 99233 SBSQ HOSP IP/OBS HIGH 50: CPT | Performed by: INTERNAL MEDICINE

## 2024-01-02 PROCEDURE — 84132 ASSAY OF SERUM POTASSIUM: CPT | Performed by: HOSPITALIST

## 2024-01-02 PROCEDURE — 36415 COLL VENOUS BLD VENIPUNCTURE: CPT | Performed by: HOSPITALIST

## 2024-01-02 PROCEDURE — 250N000009 HC RX 250: Performed by: STUDENT IN AN ORGANIZED HEALTH CARE EDUCATION/TRAINING PROGRAM

## 2024-01-02 PROCEDURE — 120N000001 HC R&B MED SURG/OB

## 2024-01-02 PROCEDURE — 99233 SBSQ HOSP IP/OBS HIGH 50: CPT | Performed by: HOSPITALIST

## 2024-01-02 PROCEDURE — 250N000013 HC RX MED GY IP 250 OP 250 PS 637: Performed by: STUDENT IN AN ORGANIZED HEALTH CARE EDUCATION/TRAINING PROGRAM

## 2024-01-02 PROCEDURE — 97116 GAIT TRAINING THERAPY: CPT | Mod: GP | Performed by: PHYSICAL THERAPY ASSISTANT

## 2024-01-02 PROCEDURE — 999N000157 HC STATISTIC RCP TIME EA 10 MIN

## 2024-01-02 PROCEDURE — 94799 UNLISTED PULMONARY SVC/PX: CPT

## 2024-01-02 PROCEDURE — 250N000011 HC RX IP 250 OP 636: Performed by: HOSPITALIST

## 2024-01-02 PROCEDURE — 250N000013 HC RX MED GY IP 250 OP 250 PS 637: Performed by: INTERNAL MEDICINE

## 2024-01-02 PROCEDURE — 250N000013 HC RX MED GY IP 250 OP 250 PS 637: Performed by: HOSPITALIST

## 2024-01-02 PROCEDURE — 94640 AIRWAY INHALATION TREATMENT: CPT | Mod: 76

## 2024-01-02 PROCEDURE — 94640 AIRWAY INHALATION TREATMENT: CPT

## 2024-01-02 PROCEDURE — 250N000012 HC RX MED GY IP 250 OP 636 PS 637: Performed by: STUDENT IN AN ORGANIZED HEALTH CARE EDUCATION/TRAINING PROGRAM

## 2024-01-02 RX ADMIN — ACETYLCYSTEINE 2 ML: 200 SOLUTION ORAL; RESPIRATORY (INHALATION) at 15:32

## 2024-01-02 RX ADMIN — IPRATROPIUM BROMIDE AND ALBUTEROL SULFATE 3 ML: .5; 3 SOLUTION RESPIRATORY (INHALATION) at 19:24

## 2024-01-02 RX ADMIN — ASPIRIN 81 MG: 81 TABLET, COATED ORAL at 20:21

## 2024-01-02 RX ADMIN — ENOXAPARIN SODIUM 40 MG: 40 INJECTION SUBCUTANEOUS at 11:54

## 2024-01-02 RX ADMIN — ACETYLCYSTEINE 2 ML: 200 SOLUTION ORAL; RESPIRATORY (INHALATION) at 19:24

## 2024-01-02 RX ADMIN — IPRATROPIUM BROMIDE AND ALBUTEROL SULFATE 3 ML: .5; 3 SOLUTION RESPIRATORY (INHALATION) at 15:32

## 2024-01-02 RX ADMIN — ACETAMINOPHEN 1000 MG: 500 TABLET, FILM COATED ORAL at 21:55

## 2024-01-02 RX ADMIN — ACETAMINOPHEN 1000 MG: 500 TABLET, FILM COATED ORAL at 06:52

## 2024-01-02 RX ADMIN — DULOXETINE HYDROCHLORIDE 20 MG: 20 CAPSULE, DELAYED RELEASE ORAL at 09:20

## 2024-01-02 RX ADMIN — ACETAMINOPHEN 1000 MG: 500 TABLET, FILM COATED ORAL at 13:51

## 2024-01-02 RX ADMIN — OXYCODONE HYDROCHLORIDE 5 MG: 5 TABLET ORAL at 09:30

## 2024-01-02 RX ADMIN — GABAPENTIN 400 MG: 100 CAPSULE ORAL at 16:38

## 2024-01-02 RX ADMIN — GABAPENTIN 400 MG: 100 CAPSULE ORAL at 09:20

## 2024-01-02 RX ADMIN — CYCLOBENZAPRINE 10 MG: 10 TABLET, FILM COATED ORAL at 00:19

## 2024-01-02 RX ADMIN — DULOXETINE HYDROCHLORIDE 20 MG: 20 CAPSULE, DELAYED RELEASE ORAL at 20:21

## 2024-01-02 RX ADMIN — ACETYLCYSTEINE 2 ML: 200 SOLUTION ORAL; RESPIRATORY (INHALATION) at 11:13

## 2024-01-02 RX ADMIN — GABAPENTIN 400 MG: 100 CAPSULE ORAL at 21:55

## 2024-01-02 RX ADMIN — LIDOCAINE 2 PATCH: 4 PATCH TOPICAL at 09:19

## 2024-01-02 RX ADMIN — IPRATROPIUM BROMIDE AND ALBUTEROL SULFATE 3 ML: .5; 3 SOLUTION RESPIRATORY (INHALATION) at 07:55

## 2024-01-02 RX ADMIN — ACETYLCYSTEINE 2 ML: 200 SOLUTION ORAL; RESPIRATORY (INHALATION) at 07:55

## 2024-01-02 RX ADMIN — LEVOTHYROXINE SODIUM 125 MCG: 125 TABLET ORAL at 09:20

## 2024-01-02 RX ADMIN — PREDNISONE 30 MG: 20 TABLET ORAL at 09:22

## 2024-01-02 RX ADMIN — ROSUVASTATIN CALCIUM 10 MG: 10 TABLET, FILM COATED ORAL at 20:21

## 2024-01-02 RX ADMIN — INSULIN ASPART 1 UNITS: 100 INJECTION, SOLUTION INTRAVENOUS; SUBCUTANEOUS at 17:41

## 2024-01-02 RX ADMIN — AMLODIPINE BESYLATE 5 MG: 5 TABLET ORAL at 21:55

## 2024-01-02 RX ADMIN — IPRATROPIUM BROMIDE AND ALBUTEROL SULFATE 3 ML: .5; 3 SOLUTION RESPIRATORY (INHALATION) at 11:16

## 2024-01-02 RX ADMIN — TRAZODONE HYDROCHLORIDE 50 MG: 50 TABLET ORAL at 21:55

## 2024-01-02 RX ADMIN — ASPIRIN 81 MG: 81 TABLET, COATED ORAL at 09:20

## 2024-01-02 RX ADMIN — TAMSULOSIN HYDROCHLORIDE 0.4 MG: 0.4 CAPSULE ORAL at 09:20

## 2024-01-02 RX ADMIN — OXYCODONE HYDROCHLORIDE 5 MG: 5 TABLET ORAL at 00:19

## 2024-01-02 RX ADMIN — PANTOPRAZOLE SODIUM 40 MG: 40 TABLET, DELAYED RELEASE ORAL at 06:52

## 2024-01-02 ASSESSMENT — ACTIVITIES OF DAILY LIVING (ADL)
ADLS_ACUITY_SCORE: 37
ADLS_ACUITY_SCORE: 36
ADLS_ACUITY_SCORE: 37
ADLS_ACUITY_SCORE: 36
ADLS_ACUITY_SCORE: 36
ADLS_ACUITY_SCORE: 37
ADLS_ACUITY_SCORE: 37
ADLS_ACUITY_SCORE: 36
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
## 2024-01-02 NOTE — PROGRESS NOTES
[facility]    Medicine Progress Note - Hospitalist Service    Date of Admission:  12/6/2023    Assessment & Plan    Harrison Thomas is a 78 year old male with medical history significant for chronic hypoxic respiratory failure [has oxygen as needed at home], COPD, RUL lung mass s/p radiation, untreated BRENNEN, HTN, HLD, CAD, hypothyroidism admitted on 12/6/2023 with acute on chronic hypoxic respiratory failure likely 2/2 COPD exacerbation.      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  BRENNEN   Pulmonary edema  *Follows with Dr. Handy with pulmonology at [facility], last seen in September 2023.  On low dose chronic prednisone (2mg).  *Patient presented to the ED with increased shortness of breath over the past week, increased oxygen use at home.  *Admission clinical picture consistent with COPD exacerbation - poor air movement with diffuse expiratory wheezing - placed on steroids and nebs.  *BNP and procalcitonin wnl, COVID/flu/RSV negative on admission, resp viral panel negative (12/15).  *CT pulm angio 12/11 negative for PE, shows mild bronchial wall thickening and extensive emphysema.  *Underwent sniff test this admission that was negative.  *Received 5-day treatment course azithromycin at admission, resumed  mg every other day dosing for prophylaxis.  *Intermittently has required high flow nasal cannula and BiPAP  *[facility] pulmonary following.  *Transitioned from IV solumedrol to prednisone 12/20.  -Continue pantoprazole until tapered back to PTA dose of prednisone.  -Duonebs qid.  -Home inhaler on hold.  -Respiratory culture obtained 12/22/23, grew 1+ Candida albicans.  -[facility] pulmonary following, discussed with Dr. Connor on 12/29.  Has received empiric intermittent IV diuretics for potential volume overload with inconsistent results.  -Hypoxia improving, patient  down to 1 L oxygen via nasal cannula however continues to be severely short of breath with minimal activity  -As per pulmonary recommendations started on MetaNebs yesterday, RT following  -Had a long discussion with the daughter on the phone yesterday and with the patient today at the bedside.  He is still opposed to going to transitional care.  He has had a prolonged hospitalization and respiratory status is still not stable.  He is going to be superhigh risk for readmission with his tenuous pulmonary status.  I discussed palliative care evaluation which he is thinking about however he would like to discuss with pulmonary tomorrow to see if there are any other options for improvement.  -Would appreciate reevaluation by pulmonary tomorrow to discuss any further options for respiratory improvement and goals of care.  -Clinical exam concerning for volume overload again, he is very crackly.  Will give Lasix 40 mg IV x 1  -Repeat chest x-ray today.     Hypernatremia  -Sodium slightly up to 147 today, suspect due to diuretics and decreased free water intake  -Still awaiting labs from this morning.     Hypertension   Hyperlipidemia  Coronary artery disease  -Continue PTA metoprolol, amlodipine, aspirin, rosuvastatin     Chronic low back pain   *Patient is supposed to be getting a lumbar MRI soon.  Can be done as an outpatient.  -Continue PTA oxycodone.  -Gabapentin changed from 600 mg bid to 400 mg tid, continue.  -Hold naproxen for now in the setting of high-dose steroids since he would be at high risk for upper GI bleed and ulcers.  -Oral PPI for prophylaxis.  -Continue PRN Flexeril.  -Scheduled Tylenol tid.  -Lidocaine patches.     Constipation, resolved  *Given concern for abdominal distention, obtained abdominal x-ray on 12/18 that still showed large amount of stool throughout the colon.  Started having bowel movement on 12/19/23 and has been having at least daily bowel movements since then.  -Continue on aggressive  "bowel regimen.     Acute urinary retention-resolved  *Required straight cath x3 12/20-12/21 and martinez placed.  -Started flomax 12/21/23.  -successful trial of voiding on 12/23/23.   -Continue to monitor for retention following catheter removal.  -Aggressively treat constipation as above.     Hx of papillary thyroid cancer s/p thyroidectomy   Hypothyroidism   -Continue PTA levothyroxine.     Insomnia   -Continue PTA Trazodone.     Leukocytosis  -Related to steroids and pneumonia.  -WBC back to normal.     Thrombocytopenia, resolved  Platelets in low 100s.  Not on any heparin products on admission through 12/20.  He is on baby aspirin twice daily, no signs of bleeding.  -Platelets 134 on 12/16/23, slowly trended down but now resolved  -Lovenox prophylaxis started 12/21/23 as platelets have trended up and are back to normal.     Bilateral pulmonary nodules  CT chest 12/11 showing scattered small bilateral pulmonary nodules measuring up to 5 mm are unchanged.  -Continued surveillance recommended.          Diet: Regular Diet Adult    DVT Prophylaxis: Enoxaparin (Lovenox) SQ  Martinez Catheter: Not present  Lines: None     Cardiac Monitoring: None  Code Status: No CPR- Do NOT Intubate      Clinically Significant Risk Factors              # Hypoalbuminemia: Lowest albumin = 3.4 g/dL at 12/8/2023 11:58 AM, will monitor as appropriate     # Hypertension: Noted on problem list        # Overweight: Estimated body mass index is 25.08 kg/m  as calculated from the following:    Height as of this encounter: 1.778 m (5' 10\").    Weight as of this encounter: 79.3 kg (174 lb 12.8 oz).             Disposition Plan      Expected Discharge Date: 01/03/2024, 12:00 PM      Discharge Comments: pt is on iv solumedrol            May Wolfe MD  Hospitalist Service  [facility]  Securely message with [employer] (more info)  Text page via [employer] Paging/Directory "   ______________________________________________________________________    Interval History   Patient sitting in bed, states he is feeling somewhat better.  He still refuses to go to [facility] stating  he would like to go home.    Physical Exam   Vital Signs: Temp: 97.9  F (36.6  C) Temp src: Oral BP: (!) 161/87 Pulse: 76   Resp: 18 SpO2: 95 % O2 Device: Nasal cannula Oxygen Delivery: 1 LPM  Weight: 174 lbs 12.8 oz    General Appearance: Well appearing for stated age.  Respiratory: CTAB, no rales or ronchi  Cardiovascular: S1, S2 normal, no murmurs  GI: non-tender on palpation, BS present      Medical Decision Making       55 MINUTES SPENT BY ME on the date of service doing chart review, history, exam, documentation & further activities per the note.      Data     I have personally reviewed the following data over the past 24 hrs:    N/A  \   N/A   / 268     142 100 12.1 /  122 (H)   3.8 34 (H) 0.81 \       Imaging results reviewed over the past 24 hrs:   No results found for this or any previous visit (from the past 24 hour(s)).

## 2024-01-02 NOTE — PLAN OF CARE
Summary: acute on chronic hypoxic respiratory failure d/t COPD exacerbation.   DATE & TIME: 1/1/23 NOC  Cognitive Concerns/ Orientation : A/Ox4, forgetful   BEHAVIOR & AGGRESSION TOOL COLOR: Green   ABNL VS/O2: VSS on 1-3L NC, needs more O2 with exertional activity. Goal > 88%,  Significant ACEVES with expiratory wheezes, course LS. On scheduled nebs. Chest physiotherapy started 12/31  MOBILITY: SBA with walker and oxygen.  PAIN MANAGMENT: low back pain managed with PRN Oxycodone and flexeril, and scheduled Tylenol-given this shift. Declined laxative meds.  DIET: Regular, tolerating  BOWEL/BLADDER: Continent- uses bedside urinal  ABNL LAB/BG: , 148  DRAIN/DEVICES: R PIV SL  SKIN: Scattered bruising and scabs, jose j bilateral lower extremities. Abrasion to R. Knee.   TESTS/PROCEDURES: none this shift  D/C DATE: Pending improvement. Awaiting reevaluation by pulmonary on 1/2 for further direction/discussion about goals of care.  OTHER IMPORTANT INFO: Pulmonology/RT following. Oral antibiotics started yesterday.

## 2024-01-02 NOTE — PLAN OF CARE
Goal Outcome Evaluation:      Plan of Care Reviewed With: patient    Overall Patient Progress: improvingOverall Patient Progress: improving    Summary: acute on chronic hypoxic respiratory failure d/t COPD exacerbation.   DATE & TIME: 1/2/24 9167-4726  Cognitive Concerns/ Orientation : A/Ox4, forgetful   BEHAVIOR & AGGRESSION TOOL COLOR: Green   ABNL VS/O2: VSS on 1L NC, needs more O2 with exertional activity. Goal > 88%,  ACEVES with coarse LS. On scheduled neb and Chest physiotherapy  MOBILITY: SBA with walker and oxygen; ambulated kirby and up in chair  PAIN MANAGMENT: low back pain - prn oxycodone given with some relief  DIET: Regular, tolerating  BOWEL/BLADDER: Continent- uses urinal  ABNL LAB/BG: BG 86,122  DRAIN/DEVICES: R PIV SL  SKIN: Scattered bruising and scabs, jose j bilateral lower extremities. Abrasion to R. Knee.   TESTS/PROCEDURES: none this shift  D/C DATE: Pending improvement. Pulmonary follow up, see note.  OTHER IMPORTANT INFO:

## 2024-01-03 LAB
ANION GAP SERPL CALCULATED.3IONS-SCNC: 6 MMOL/L (ref 7–15)
BUN SERPL-MCNC: 12.7 MG/DL (ref 8–23)
CALCIUM SERPL-MCNC: 8.9 MG/DL (ref 8.8–10.2)
CHLORIDE SERPL-SCNC: 102 MMOL/L (ref 98–107)
CREAT SERPL-MCNC: 0.88 MG/DL (ref 0.67–1.17)
DEPRECATED HCO3 PLAS-SCNC: 34 MMOL/L (ref 22–29)
EGFRCR SERPLBLD CKD-EPI 2021: 89 ML/MIN/1.73M2
GLUCOSE BLDC GLUCOMTR-MCNC: 123 MG/DL (ref 70–99)
GLUCOSE BLDC GLUCOMTR-MCNC: 127 MG/DL (ref 70–99)
GLUCOSE BLDC GLUCOMTR-MCNC: 134 MG/DL (ref 70–99)
GLUCOSE BLDC GLUCOMTR-MCNC: 150 MG/DL (ref 70–99)
GLUCOSE BLDC GLUCOMTR-MCNC: 91 MG/DL (ref 70–99)
GLUCOSE SERPL-MCNC: 126 MG/DL (ref 70–99)
POTASSIUM SERPL-SCNC: 4 MMOL/L (ref 3.4–5.3)
SODIUM SERPL-SCNC: 142 MMOL/L (ref 135–145)

## 2024-01-03 PROCEDURE — 250N000013 HC RX MED GY IP 250 OP 250 PS 637: Performed by: HOSPITALIST

## 2024-01-03 PROCEDURE — 250N000009 HC RX 250: Performed by: STUDENT IN AN ORGANIZED HEALTH CARE EDUCATION/TRAINING PROGRAM

## 2024-01-03 PROCEDURE — 250N000012 HC RX MED GY IP 250 OP 636 PS 637: Performed by: STUDENT IN AN ORGANIZED HEALTH CARE EDUCATION/TRAINING PROGRAM

## 2024-01-03 PROCEDURE — 999N000157 HC STATISTIC RCP TIME EA 10 MIN

## 2024-01-03 PROCEDURE — 250N000013 HC RX MED GY IP 250 OP 250 PS 637: Performed by: INTERNAL MEDICINE

## 2024-01-03 PROCEDURE — 250N000011 HC RX IP 250 OP 636: Performed by: HOSPITALIST

## 2024-01-03 PROCEDURE — 120N000001 HC R&B MED SURG/OB

## 2024-01-03 PROCEDURE — 36415 COLL VENOUS BLD VENIPUNCTURE: CPT | Performed by: HOSPITALIST

## 2024-01-03 PROCEDURE — 99232 SBSQ HOSP IP/OBS MODERATE 35: CPT | Performed by: HOSPITALIST

## 2024-01-03 PROCEDURE — 250N000009 HC RX 250: Performed by: HOSPITALIST

## 2024-01-03 PROCEDURE — 94640 AIRWAY INHALATION TREATMENT: CPT | Mod: 76

## 2024-01-03 PROCEDURE — 94640 AIRWAY INHALATION TREATMENT: CPT

## 2024-01-03 PROCEDURE — 94799 UNLISTED PULMONARY SVC/PX: CPT

## 2024-01-03 PROCEDURE — 99233 SBSQ HOSP IP/OBS HIGH 50: CPT | Performed by: INTERNAL MEDICINE

## 2024-01-03 PROCEDURE — 82374 ASSAY BLOOD CARBON DIOXIDE: CPT | Performed by: HOSPITALIST

## 2024-01-03 PROCEDURE — 250N000013 HC RX MED GY IP 250 OP 250 PS 637: Performed by: STUDENT IN AN ORGANIZED HEALTH CARE EDUCATION/TRAINING PROGRAM

## 2024-01-03 RX ADMIN — ACETYLCYSTEINE 2 ML: 200 SOLUTION ORAL; RESPIRATORY (INHALATION) at 15:40

## 2024-01-03 RX ADMIN — ENOXAPARIN SODIUM 40 MG: 40 INJECTION SUBCUTANEOUS at 10:37

## 2024-01-03 RX ADMIN — ASPIRIN 81 MG: 81 TABLET, COATED ORAL at 08:36

## 2024-01-03 RX ADMIN — TAMSULOSIN HYDROCHLORIDE 0.4 MG: 0.4 CAPSULE ORAL at 08:36

## 2024-01-03 RX ADMIN — DULOXETINE HYDROCHLORIDE 20 MG: 20 CAPSULE, DELAYED RELEASE ORAL at 22:05

## 2024-01-03 RX ADMIN — LEVOTHYROXINE SODIUM 125 MCG: 125 TABLET ORAL at 08:36

## 2024-01-03 RX ADMIN — ACETYLCYSTEINE 2 ML: 200 SOLUTION ORAL; RESPIRATORY (INHALATION) at 19:17

## 2024-01-03 RX ADMIN — CYCLOBENZAPRINE 10 MG: 10 TABLET, FILM COATED ORAL at 01:40

## 2024-01-03 RX ADMIN — OXYCODONE HYDROCHLORIDE 5 MG: 5 TABLET ORAL at 01:40

## 2024-01-03 RX ADMIN — IPRATROPIUM BROMIDE AND ALBUTEROL SULFATE 3 ML: .5; 3 SOLUTION RESPIRATORY (INHALATION) at 19:17

## 2024-01-03 RX ADMIN — ACETYLCYSTEINE 2 ML: 200 SOLUTION ORAL; RESPIRATORY (INHALATION) at 07:23

## 2024-01-03 RX ADMIN — ACETAMINOPHEN 1000 MG: 500 TABLET, FILM COATED ORAL at 05:58

## 2024-01-03 RX ADMIN — IPRATROPIUM BROMIDE AND ALBUTEROL SULFATE 3 ML: .5; 3 SOLUTION RESPIRATORY (INHALATION) at 10:57

## 2024-01-03 RX ADMIN — AZITHROMYCIN DIHYDRATE 500 MG: 250 TABLET ORAL at 08:35

## 2024-01-03 RX ADMIN — GABAPENTIN 400 MG: 100 CAPSULE ORAL at 17:02

## 2024-01-03 RX ADMIN — GABAPENTIN 400 MG: 100 CAPSULE ORAL at 22:05

## 2024-01-03 RX ADMIN — PANTOPRAZOLE SODIUM 40 MG: 40 TABLET, DELAYED RELEASE ORAL at 05:58

## 2024-01-03 RX ADMIN — ACETYLCYSTEINE 2 ML: 200 SOLUTION ORAL; RESPIRATORY (INHALATION) at 10:57

## 2024-01-03 RX ADMIN — METOPROLOL SUCCINATE 50 MG: 50 TABLET, EXTENDED RELEASE ORAL at 22:05

## 2024-01-03 RX ADMIN — LIDOCAINE 2 PATCH: 4 PATCH TOPICAL at 08:36

## 2024-01-03 RX ADMIN — TRAZODONE HYDROCHLORIDE 50 MG: 50 TABLET ORAL at 22:05

## 2024-01-03 RX ADMIN — DULOXETINE HYDROCHLORIDE 20 MG: 20 CAPSULE, DELAYED RELEASE ORAL at 08:36

## 2024-01-03 RX ADMIN — IPRATROPIUM BROMIDE AND ALBUTEROL SULFATE 3 ML: .5; 3 SOLUTION RESPIRATORY (INHALATION) at 15:39

## 2024-01-03 RX ADMIN — GABAPENTIN 400 MG: 100 CAPSULE ORAL at 08:35

## 2024-01-03 RX ADMIN — PREDNISONE 20 MG: 20 TABLET ORAL at 08:36

## 2024-01-03 RX ADMIN — ACETYLCYSTEINE 2 ML: 200 SOLUTION ORAL; RESPIRATORY (INHALATION) at 23:18

## 2024-01-03 RX ADMIN — AMLODIPINE BESYLATE 5 MG: 5 TABLET ORAL at 22:05

## 2024-01-03 RX ADMIN — IPRATROPIUM BROMIDE AND ALBUTEROL SULFATE 3 ML: .5; 3 SOLUTION RESPIRATORY (INHALATION) at 23:18

## 2024-01-03 RX ADMIN — ACETAMINOPHEN 1000 MG: 500 TABLET, FILM COATED ORAL at 22:05

## 2024-01-03 RX ADMIN — DOCUSATE SODIUM 50 MG AND SENNOSIDES 8.6 MG 2 TABLET: 8.6; 5 TABLET, FILM COATED ORAL at 08:35

## 2024-01-03 RX ADMIN — INSULIN ASPART 1 UNITS: 100 INJECTION, SOLUTION INTRAVENOUS; SUBCUTANEOUS at 13:24

## 2024-01-03 RX ADMIN — ACETAMINOPHEN 1000 MG: 500 TABLET, FILM COATED ORAL at 13:25

## 2024-01-03 RX ADMIN — OXYCODONE HYDROCHLORIDE 5 MG: 5 TABLET ORAL at 08:35

## 2024-01-03 RX ADMIN — ASPIRIN 81 MG: 81 TABLET, COATED ORAL at 22:05

## 2024-01-03 RX ADMIN — ROSUVASTATIN CALCIUM 10 MG: 10 TABLET, FILM COATED ORAL at 22:05

## 2024-01-03 RX ADMIN — IPRATROPIUM BROMIDE AND ALBUTEROL SULFATE 3 ML: .5; 3 SOLUTION RESPIRATORY (INHALATION) at 07:23

## 2024-01-03 ASSESSMENT — ACTIVITIES OF DAILY LIVING (ADL)
ADLS_ACUITY_SCORE: 36
ADLS_ACUITY_SCORE: 37
ADLS_ACUITY_SCORE: 36
ADLS_ACUITY_SCORE: 33
ADLS_ACUITY_SCORE: 33
ADLS_ACUITY_SCORE: 37
ADLS_ACUITY_SCORE: 37
ADLS_ACUITY_SCORE: 33
ADLS_ACUITY_SCORE: 33
ADLS_ACUITY_SCORE: 37
ADLS_ACUITY_SCORE: 37
ADLS_ACUITY_SCORE: 36

## 2024-01-03 NOTE — PROGRESS NOTES
[facility]    Medicine Progress Note - Hospitalist Service    Date of Admission:  12/6/2023    Assessment & Plan    Harrison Thomas is a 78 year old male with medical history significant for chronic hypoxic respiratory failure [has oxygen as needed at home], COPD, RUL lung mass s/p radiation, untreated BRENNEN, HTN, HLD, CAD, hypothyroidism admitted on 12/6/2023 with acute on chronic hypoxic respiratory failure likely 2/2 COPD exacerbation.      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  BRENNEN   Pulmonary edema  *Follows with Dr. Handy with pulmonology at [facility], last seen in September 2023.  On low dose chronic prednisone (2mg).  *Patient presented to the ED with increased shortness of breath over the past week, increased oxygen use at home.  *Admission clinical picture consistent with COPD exacerbation - poor air movement with diffuse expiratory wheezing - placed on steroids and nebs.  *BNP and procalcitonin wnl, COVID/flu/RSV negative on admission, resp viral panel negative (12/15).  *CT pulm angio 12/11 negative for PE, shows mild bronchial wall thickening and extensive emphysema.  *Underwent sniff test this admission that was negative.  *Received 5-day treatment course azithromycin at admission, resumed  mg every other day dosing for prophylaxis.  *Intermittently has required high flow nasal cannula and BiPAP  *[facility] pulmonary following.  *Transitioned from IV solumedrol to prednisone 12/20.  -Continue pantoprazole until tapered back to PTA dose of prednisone.  -Duonebs qid.  -Home inhaler on hold.  -Respiratory culture obtained 12/22/23, grew 1+ Candida albicans.  -[facility] pulmonary following, discussed with Dr. Connor on 12/29.  Has received empiric intermittent IV diuretics for potential volume overload with inconsistent results.  -Hypoxia improving, patient  down to 1 L oxygen via nasal cannula however continues to be severely short of breath with minimal activity  -As per pulmonary recommendations started on MetaNebs yesterday, RT following  -Prior hospitalist discussed with daughter on the phone and with the patient at the bedside.  He is still opposed to going to transitional care.  He has had a prolonged hospitalization and respiratory status is still not stable.  He is going to be superhigh risk for readmission with his tenuous pulmonary status.  I discussed palliative care evaluation which he is thinking about however he would like to discuss with pulmonary tomorrow to see if there are any other options for improvement.  -Would appreciate reevaluation by pulmonary tomorrow to discuss any further options for respiratory improvement and goals of care.  -Clinical exam concerning for volume overload again, he is very crackly.  Will give Lasix 40 mg IV x 1  -Repeat chest x-ray  1/1/24 reveals nearly resolved right upper lobe opacity, and improved bibasilar opacities consistent with improving infection/inflammation. Cardiomediastinal silhouette is unremarkable.          Hypernatremia  -Sodium slightly up to 147 today, suspect due to diuretics and decreased free water intake  -Still awaiting labs from this morning.     Hypertension   Hyperlipidemia  Coronary artery disease  -Continue PTA metoprolol, amlodipine, aspirin, rosuvastatin     Chronic low back pain   *Patient is supposed to be getting a lumbar MRI soon.  Can be done as an outpatient.  -Continue PTA oxycodone.  -Gabapentin changed from 600 mg bid to 400 mg tid, continue.  -Hold naproxen for now in the setting of high-dose steroids since he would be at high risk for upper GI bleed and ulcers.  -Oral PPI for prophylaxis.  -Continue PRN Flexeril.  -Scheduled Tylenol tid.  -Lidocaine patches.     Constipation, resolved  *Given concern for abdominal distention, obtained abdominal x-ray on 12/18 that still showed  "large amount of stool throughout the colon.  Started having bowel movement on 12/19/23 and has been having at least daily bowel movements since then.  -Continue on aggressive bowel regimen.     Acute urinary retention-resolved  *Required straight cath x3 12/20-12/21 and martinez placed.  -Started flomax 12/21/23.  -successful trial of voiding on 12/23/23.   -Continue to monitor for retention following catheter removal.  -Aggressively treat constipation as above.     Hx of papillary thyroid cancer s/p thyroidectomy   Hypothyroidism   -Continue PTA levothyroxine.     Insomnia   -Continue PTA Trazodone.     Leukocytosis  -Related to steroids and pneumonia.  -WBC back to normal.     Thrombocytopenia, resolved  Platelets in low 100s.  Not on any heparin products on admission through 12/20.  He is on baby aspirin twice daily, no signs of bleeding.  -Platelets 134 on 12/16/23, slowly trended down but now resolved  -Lovenox prophylaxis started 12/21/23 as platelets have trended up and are back to normal.     Bilateral pulmonary nodules  CT chest 12/11 showing scattered small bilateral pulmonary nodules measuring up to 5 mm are unchanged.  -Continued surveillance recommended.          Diet: Regular Diet Adult    DVT Prophylaxis: Enoxaparin (Lovenox) SQ  Martinez Catheter: Not present  Lines: None     Cardiac Monitoring: None  Code Status: No CPR- Do NOT Intubate      Clinically Significant Risk Factors              # Hypoalbuminemia: Lowest albumin = 3.4 g/dL at 12/8/2023 11:58 AM, will monitor as appropriate     # Hypertension: Noted on problem list        # Overweight: Estimated body mass index is 25.55 kg/m  as calculated from the following:    Height as of this encounter: 1.778 m (5' 10\").    Weight as of this encounter: 80.8 kg (178 lb 1.6 oz).             Disposition Plan     Expected Discharge Date: 01/03/2024, 12:00 PM      Discharge Comments: pt is on iv solumedrol            May Wolfe MD  Hospitalist [facility]" [facility]  Securely message with [employer] (more info)  Text page via [employer] Paging/Directory   ______________________________________________________________________    Interval History   Patient lying in bed, he notes he feels well today other than symptoms somewhat fatigued.  Denies any pain or shortness of breath at this time.    Physical Exam   Vital Signs: Temp: 97.4  F (36.3  C) Temp src: Oral BP: 136/84 Pulse: 80   Resp: 18 SpO2: 94 % O2 Device: Nasal cannula Oxygen Delivery: 1 LPM  Weight: 178 lbs 1.6 oz    General Appearance: Well appearing for stated age.  Respiratory: CTAB, no rales or ronchi  Cardiovascular: S1, S2 normal, no murmurs  GI: non-tender on palpation, BS present      Medical Decision Making       55 MINUTES SPENT BY ME on the date of service doing chart review, history, exam, documentation & further activities per the note.      Data     I have personally reviewed the following data over the past 24 hrs:    N/A  \   N/A   / N/A     N/A N/A N/A /  91   3.8 N/A N/A \       Imaging results reviewed over the past 24 hrs:   No results found for this or any previous visit (from the past 24 hour(s)).
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
## 2024-01-03 NOTE — PLAN OF CARE
Summary: acute on chronic hypoxic respiratory failure d/t COPD exacerbation    DATE & TIME: 01/02/24 8752-2354  Cognitive Concerns/ Orientation: Alert/Oriented x 4, forgetful   BEHAVIOR & AGGRESSION TOOL COLOR: Green             ABNL VS/O2: VSS, 1 LPM oxygen via nasal cannula with O2 sats low to mid 90s; increased O2 needs with exertion; Baseline PRN oxygen use; Coarse + inspiratory/expiratory wheezing; Scheduled nebs an chest physiotherapy  MOBILITY: Independent with walker/O2  PAIN MANAGMENT: Chronic low back pain - on scheduled tylenol/PRN oxycodone/flexeril given x1; lidocaine patch removed on evening  DIET: Regular  BOWEL/BLADDER: Continent- using urinal  ABNL LAB/BG:   DRAIN/DEVICES: R PIV SL  SKIN: Scattered bruising/scabs; Bilateral lower extremities jose j;  Abrasion/scab to R knee   TESTS/PROCEDURES: none  D/C DATE: Pending improvement Pulmonary follow up, see note.  OTHER IMPORTANT INFO: Pulmonology following--to follow up outpt

## 2024-01-03 NOTE — PLAN OF CARE
Goal Outcome Evaluation:      Plan of Care Reviewed With: patient    Overall Patient Progress: no changeOverall Patient Progress: no change    Summary: acute on chronic hypoxic respiratory failure d/t COPD exacerbation.   DATE & TIME: 01/3/24 [id number]  Cognitive Concerns/ Orientation : A/Ox4, forgetful   BEHAVIOR & AGGRESSION TOOL COLOR: Green   ABNL VS/O2: VSS on 1L NC, needed more O2 with ambulation in [place] today with sats 70-80's on 1 lpm, increased to 4 lpm at the end of the walk to keep sats greater than 88%, returned to 1 lpm after just a couple minutes..   MOBILITY: SBA with walker and oxygen; ambulated kirby x1 and up in chair  PAIN MANAGMENT: low back pain - on scheduled tylenol, lidocaine patch (2) on lower back; prn oxycodone given with little relief.  DIET: Regular, tolerating  BOWEL/BLADDER: Did not void and abdomen was distended; patient did not feel uncomfortable.  Patient voided 150cc and PVR was 550 ml.  Straight cath performed with coude cath and 750 ml yellow urine returned.  ABNL LAB/BG: BG 91, 150  DRAIN/DEVICES: R AC PIV SL  SKIN: Scattered bruising and scabs, jose j bilateral lower extremities. Abrasion to R. Knee.   TESTS/PROCEDURES: none this shift  D/C DATE: Pending improvement. Pulmonary following  OTHER IMPORTANT INFO: on scheduled nebs and chest therapy; ambulation in [place] this morning and did seem somewhat more SOB and fatigued today but did tolerate; also sats did not maintain like they did yesterday.       Addendum:  curly paged Dr. Ramires to ask if she could call daughter Janey.

## 2024-01-03 NOTE — PLAN OF CARE
Summary: acute on chronic hypoxic respiratory failure d/t COPD exacerbation.   DATE & TIME: 01/02/24 [id number]  Cognitive Concerns/ Orientation : A/Ox4, forgetful   BEHAVIOR & AGGRESSION TOOL COLOR: Green   ABNL VS/O2: VSS on 1L NC, needs more O2 with exertional activity. Goal > 88%,  ACEVES with coarse LS. On scheduled neb and Chest physiotherapy, maintained O2 >92% on 1L NC when ambulating in halls  MOBILITY: SBA with walker and oxygen; ambulated kirby x1 and up in chair  PAIN MANAGMENT: low back pain - on scheduled tylenol, lidocaine patch removed  DIET: Regular, tolerating  BOWEL/BLADDER: Continent- uses urinal  ABNL LAB/BG: , 163  DRAIN/DEVICES: R PIV SL  SKIN: Scattered bruising and scabs, jose j bilateral lower extremities. Abrasion to R. Knee.   TESTS/PROCEDURES: none this shift  D/C DATE: Pending improvement. Pulmonary follow up, see note.  OTHER IMPORTANT INFO: on scheduled nebs, updated daughter  pulmonology following

## 2024-01-04 ENCOUNTER — APPOINTMENT (OUTPATIENT)
Dept: PHYSICAL THERAPY | Facility: CLINIC | Age: 79
DRG: 190 | End: 2024-01-04
Payer: MEDICARE

## 2024-01-04 LAB
GLUCOSE BLDC GLUCOMTR-MCNC: 179 MG/DL (ref 70–99)
GLUCOSE BLDC GLUCOMTR-MCNC: 237 MG/DL (ref 70–99)
GLUCOSE BLDC GLUCOMTR-MCNC: 72 MG/DL (ref 70–99)
GLUCOSE BLDC GLUCOMTR-MCNC: 77 MG/DL (ref 70–99)
GLUCOSE BLDC GLUCOMTR-MCNC: 92 MG/DL (ref 70–99)
PLATELET # BLD AUTO: 175 10E3/UL (ref 150–450)

## 2024-01-04 PROCEDURE — 999N000157 HC STATISTIC RCP TIME EA 10 MIN

## 2024-01-04 PROCEDURE — 250N000013 HC RX MED GY IP 250 OP 250 PS 637: Performed by: STUDENT IN AN ORGANIZED HEALTH CARE EDUCATION/TRAINING PROGRAM

## 2024-01-04 PROCEDURE — 99233 SBSQ HOSP IP/OBS HIGH 50: CPT | Performed by: INTERNAL MEDICINE

## 2024-01-04 PROCEDURE — 250N000013 HC RX MED GY IP 250 OP 250 PS 637: Performed by: INTERNAL MEDICINE

## 2024-01-04 PROCEDURE — 94799 UNLISTED PULMONARY SVC/PX: CPT

## 2024-01-04 PROCEDURE — 94640 AIRWAY INHALATION TREATMENT: CPT

## 2024-01-04 PROCEDURE — 250N000013 HC RX MED GY IP 250 OP 250 PS 637: Performed by: HOSPITALIST

## 2024-01-04 PROCEDURE — 250N000009 HC RX 250: Performed by: STUDENT IN AN ORGANIZED HEALTH CARE EDUCATION/TRAINING PROGRAM

## 2024-01-04 PROCEDURE — 85049 AUTOMATED PLATELET COUNT: CPT | Performed by: STUDENT IN AN ORGANIZED HEALTH CARE EDUCATION/TRAINING PROGRAM

## 2024-01-04 PROCEDURE — 250N000012 HC RX MED GY IP 250 OP 636 PS 637: Performed by: STUDENT IN AN ORGANIZED HEALTH CARE EDUCATION/TRAINING PROGRAM

## 2024-01-04 PROCEDURE — 94640 AIRWAY INHALATION TREATMENT: CPT | Mod: 76

## 2024-01-04 PROCEDURE — 99223 1ST HOSP IP/OBS HIGH 75: CPT | Performed by: NURSE PRACTITIONER

## 2024-01-04 PROCEDURE — 36415 COLL VENOUS BLD VENIPUNCTURE: CPT | Performed by: STUDENT IN AN ORGANIZED HEALTH CARE EDUCATION/TRAINING PROGRAM

## 2024-01-04 PROCEDURE — 97116 GAIT TRAINING THERAPY: CPT | Mod: GP | Performed by: PHYSICAL THERAPY ASSISTANT

## 2024-01-04 PROCEDURE — 94660 CPAP INITIATION&MGMT: CPT

## 2024-01-04 PROCEDURE — 99232 SBSQ HOSP IP/OBS MODERATE 35: CPT | Performed by: HOSPITALIST

## 2024-01-04 PROCEDURE — 120N000001 HC R&B MED SURG/OB

## 2024-01-04 PROCEDURE — 250N000011 HC RX IP 250 OP 636: Performed by: HOSPITALIST

## 2024-01-04 PROCEDURE — 99207 PR NO BILLABLE SERVICE THIS VISIT: CPT | Performed by: NURSE PRACTITIONER

## 2024-01-04 RX ADMIN — ACETYLCYSTEINE 2 ML: 200 SOLUTION ORAL; RESPIRATORY (INHALATION) at 20:45

## 2024-01-04 RX ADMIN — ROSUVASTATIN CALCIUM 10 MG: 10 TABLET, FILM COATED ORAL at 21:49

## 2024-01-04 RX ADMIN — ACETYLCYSTEINE 2 ML: 200 SOLUTION ORAL; RESPIRATORY (INHALATION) at 11:35

## 2024-01-04 RX ADMIN — TAMSULOSIN HYDROCHLORIDE 0.4 MG: 0.4 CAPSULE ORAL at 09:36

## 2024-01-04 RX ADMIN — GABAPENTIN 400 MG: 100 CAPSULE ORAL at 17:14

## 2024-01-04 RX ADMIN — AMLODIPINE BESYLATE 5 MG: 5 TABLET ORAL at 21:49

## 2024-01-04 RX ADMIN — OXYCODONE HYDROCHLORIDE 5 MG: 5 TABLET ORAL at 00:06

## 2024-01-04 RX ADMIN — OXYCODONE HYDROCHLORIDE 5 MG: 5 TABLET ORAL at 22:44

## 2024-01-04 RX ADMIN — ACETYLCYSTEINE 2 ML: 200 SOLUTION ORAL; RESPIRATORY (INHALATION) at 16:44

## 2024-01-04 RX ADMIN — GABAPENTIN 400 MG: 100 CAPSULE ORAL at 09:36

## 2024-01-04 RX ADMIN — IPRATROPIUM BROMIDE AND ALBUTEROL SULFATE 3 ML: .5; 3 SOLUTION RESPIRATORY (INHALATION) at 16:40

## 2024-01-04 RX ADMIN — ACETAMINOPHEN 1000 MG: 500 TABLET, FILM COATED ORAL at 13:16

## 2024-01-04 RX ADMIN — DULOXETINE HYDROCHLORIDE 20 MG: 20 CAPSULE, DELAYED RELEASE ORAL at 21:49

## 2024-01-04 RX ADMIN — CYCLOBENZAPRINE 10 MG: 10 TABLET, FILM COATED ORAL at 22:44

## 2024-01-04 RX ADMIN — PANTOPRAZOLE SODIUM 40 MG: 40 TABLET, DELAYED RELEASE ORAL at 06:58

## 2024-01-04 RX ADMIN — ACETAMINOPHEN 1000 MG: 500 TABLET, FILM COATED ORAL at 21:49

## 2024-01-04 RX ADMIN — ASPIRIN 81 MG: 81 TABLET, COATED ORAL at 21:49

## 2024-01-04 RX ADMIN — PREDNISONE 20 MG: 20 TABLET ORAL at 09:36

## 2024-01-04 RX ADMIN — ACETAMINOPHEN 1000 MG: 500 TABLET, FILM COATED ORAL at 06:57

## 2024-01-04 RX ADMIN — TRAZODONE HYDROCHLORIDE 50 MG: 50 TABLET ORAL at 21:49

## 2024-01-04 RX ADMIN — METOPROLOL SUCCINATE 50 MG: 50 TABLET, EXTENDED RELEASE ORAL at 21:49

## 2024-01-04 RX ADMIN — ASPIRIN 81 MG: 81 TABLET, COATED ORAL at 09:36

## 2024-01-04 RX ADMIN — IPRATROPIUM BROMIDE AND ALBUTEROL SULFATE 3 ML: .5; 3 SOLUTION RESPIRATORY (INHALATION) at 11:35

## 2024-01-04 RX ADMIN — DULOXETINE HYDROCHLORIDE 20 MG: 20 CAPSULE, DELAYED RELEASE ORAL at 09:36

## 2024-01-04 RX ADMIN — LEVOTHYROXINE SODIUM 125 MCG: 125 TABLET ORAL at 09:36

## 2024-01-04 RX ADMIN — INSULIN ASPART 1 UNITS: 100 INJECTION, SOLUTION INTRAVENOUS; SUBCUTANEOUS at 18:14

## 2024-01-04 RX ADMIN — IPRATROPIUM BROMIDE AND ALBUTEROL SULFATE 3 ML: .5; 3 SOLUTION RESPIRATORY (INHALATION) at 08:21

## 2024-01-04 RX ADMIN — IPRATROPIUM BROMIDE AND ALBUTEROL SULFATE 3 ML: .5; 3 SOLUTION RESPIRATORY (INHALATION) at 20:45

## 2024-01-04 RX ADMIN — GABAPENTIN 400 MG: 100 CAPSULE ORAL at 21:49

## 2024-01-04 RX ADMIN — OXYCODONE HYDROCHLORIDE 5 MG: 5 TABLET ORAL at 09:49

## 2024-01-04 RX ADMIN — ENOXAPARIN SODIUM 40 MG: 40 INJECTION SUBCUTANEOUS at 09:36

## 2024-01-04 RX ADMIN — CYCLOBENZAPRINE 10 MG: 10 TABLET, FILM COATED ORAL at 00:06

## 2024-01-04 RX ADMIN — ACETYLCYSTEINE 2 ML: 200 SOLUTION ORAL; RESPIRATORY (INHALATION) at 08:21

## 2024-01-04 ASSESSMENT — ACTIVITIES OF DAILY LIVING (ADL)
ADLS_ACUITY_SCORE: 33

## 2024-01-04 NOTE — PROGRESS NOTES
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     # Acute Hypoxic Respiratory Failure  # Moderate COPD with Exacerbation (FEV1 46%/1.28L, 3/2023)  # HAP  # Right upper lobe lung mass status post radiation treatment with possible chronic radiation changes versus radiation pneumonitis  # Seropositive RA (previously on methotrexate and prednisone, now off)  # Moderate sleep apnea (AHI 20 in 2018)  # Former smoker     History of right upper lobe mass without clear etiology after bronchoscopy and VATS biopsy.  Underwent empiric radiation therapy in mid 2022.  Outpatient pulmonologist Dr. Handy at the [facility].     Admitted 12/6 following increased home use of oxygen and rescue therapies. BNP and procalcitonin negative on admission.  Chest imaging largely stable from prior with some bronchial wall thickening and chronic right upper lobe consolidation which was initially stable, but then increased on 12/18.  Repeat CT 12/21 with dense scattered bilateral infiltrates new from CT on 12/11.  Oxygen requirements worsened to the point that he is requiring high flow nasal cannula.      Persistent severe wheezing with dependence on steroids, scheduled bronchodilators, mucolytic and flutter valve.  Respiratory viral panel negative.  Sniff test (12/11) within normal limits. Abdominal distention likely impacting breathing as well given low reserve.  Echo (12/24): Normal EF.  Has been slow to improve with now prolonged hospital  "stay.    Completed Zosyn for HAP (12/21-31).    Recommendations:  -Follow-up with sleep medicine after discharge  -Duonebs QID  -Mucomyst QID (do with DuoNebs)  -Flutter valve following nebs QID  -Continue metanebs/IPPV with duonebs  -Agree with current steroids, drop by 10mg every 7 days   -Currently prednisone 20 mg daily, next taper on 1/10  -Steroid taper ordered, once complete should resume PTA 2 mg prednisone daily   -Continue PTA Trelegy on discharge.  -Patient should be assessed for home-going O2 needs prior to discharge.  As he is ambulatory in the house, would benefit from portable O2 concentrator.  Please use dot phrase \"portableconcentrator\" for appropriate documentation.  -Would benefit from pulmonary rehab on discharge  -Appreciate palliative care engagement.  -Follow up with pulmonary in one month following discharge (sees Dr. Handy at [facility], next appt 3/29).    Case additionally discussed with OP Pulmonologist Dr. Handy on 1/2.  We agree that palliative consult for exploration of goals of care would be reasonable given chronic pulmonary disease and now prolonged hospitalization with other comorbid conditions.  His lung function prior to this admission was only moderately reduced but he is undoubtedly quite deconditioned given his prolonged hospital course. He is on maximal therapy for COPD so if breathlessness is symptomatically limiting, a small dose of opioids could also be considered as treatment; but I would defer dosing recs to pall care.    Called and updated daughter Janey.  We spoke at length about his condition, my concerns that his improvement has plateaued, his insistence that he not go to TCU, and the collective concerns of his care team about the risks of him returning home.  I let her know that we felt that his best chance of improving and staying at home was to go to TCU.  Short of that we are concerned that he would come right back to the hospital, not necessarily with a condition " that we could treat (eg pna or AECOPD) but due to the fact that symptoms had progressed to a level that was no longer compatible with living independently at home. I also introduced the idea of palliative care for further discussion about goals of care. She will have ongoing conversations with her dad.      Plan of care discussed with hospitalist and palliative care extensively, pulmonary will continue to follow peripherally.    Arianna Ramires MD  Pulmonary and Critical Care Medicine    We are in house at [facility] on Monday, Wednesday, and Friday. For assistance on other days, please page the on-call pulmonologist through [facility] or the .      Billing: I spent a total of 70 minutes on this visit including review of chart, images, labs, and notes, bedside exam, and documentation.          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    -saturating in mid to high 90s on 1L NC  -Patient continues to state that he feels breathing has turned a corner but also continues to experience some SOB.  -Continues to feel like metanebs are very helpful for him.      Review of Systems:  10 out of 14 systems reviewed and negative unless otherwise noted in HPI.      Medications:  Current Facility-Administered Medications   Medication    acetaminophen (TYLENOL) tablet 1,000 mg    acetylcysteine (MUCOMYST) 20 % nebulizer solution 2 mL    amLODIPine (NORVASC) tablet 5 mg    aspirin EC tablet 81 mg    azithromycin (ZITHROMAX) tablet 500 mg    bisacodyl (DULCOLAX) suppository 10 mg    calcium carbonate (TUMS) chewable tablet 1,000 mg    carboxymethylcellulose PF (REFRESH PLUS) 0.5 % ophthalmic solution 1 drop    cyclobenzaprine (FLEXERIL) tablet 10  mg    glucose gel 15-30 g    Or    dextrose 50 % injection 25-50 mL    Or    glucagon injection 1 mg    DULoxetine (CYMBALTA) DR capsule 20 mg    enoxaparin ANTICOAGULANT (LOVENOX) injection 40 mg    [Held by provider] Fluticasone-Umeclidin-Vilant (TRELEGY ELLIPTA) 100-62.5-25 MCG/ACT oral inhaler 1 puff    [Held by provider] furosemide (LASIX) tablet 40 mg    gabapentin (NEURONTIN) capsule 400 mg    guaiFENesin (MUCINEX) 12 hr tablet 600 mg    insulin aspart (NovoLOG) injection (RAPID ACTING)    insulin aspart (NovoLOG) injection (RAPID ACTING)    ipratropium - albuterol 0.5 mg/2.5 mg/3 mL (DUONEB) neb solution 3 mL    ipratropium - albuterol 0.5 mg/2.5 mg/3 mL (DUONEB) neb solution 3 mL    levothyroxine (SYNTHROID/LEVOTHROID) tablet 125 mcg    Lidocaine (LIDOCARE) 4 % Patch 2 patch    lidocaine (LMX4) cream    lidocaine 1 % 0.1-1 mL    magnesium citrate solution 296 mL    metoprolol succinate ER (TOPROL XL) 24 hr tablet 50 mg    naloxone (NARCAN) injection 0.2 mg    Or    naloxone (NARCAN) injection 0.4 mg    Or    naloxone (NARCAN) injection 0.2 mg    Or    naloxone (NARCAN) injection 0.4 mg    No lozenges or gum should be given while patient on BIPAP/AVAPS/AVAPS AE    oxyCODONE (ROXICODONE) tablet 5 mg    pantoprazole (PROTONIX) EC tablet 40 mg    Patient may continue current oral medications    polyethylene glycol (MIRALAX) Packet 17 g    predniSONE (DELTASONE) tablet 20 mg    Followed by    [START ON 1/10/2024] predniSONE (DELTASONE) tablet 10 mg    Followed by    [START ON 1/17/2024] predniSONE (DELTASONE) tablet 5 mg    rosuvastatin (CRESTOR) tablet 10 mg    senna-docusate (SENOKOT-S/PERICOLACE) 8.6-50 MG per tablet 2 tablet    sodium chloride (PF) 0.9% PF flush 3 mL    sodium chloride (PF) 0.9% PF flush 3 mL    tamsulosin (FLOMAX) capsule 0.4 mg    traZODone (DESYREL) tablet 50 mg         Physical examination:  Vital signs:  Temp: 97.7  F (36.5  C) Temp src: Oral BP: (!) 167/86 Pulse: 65   Resp: 20 SpO2: 92  % O2 Device: Nasal cannula Oxygen Delivery: 1 LPM    General: Alert, oriented, not in distress  HEENT: neck supple, symmetrical  Lungs: Coarse breath sounds bilaterally.  Occasional soft wheeze at lung bases.  CVS: Normal S1 & S2, no murmur  Abdomen: Bowel sounds present, soft, non tender  Extremities/musculoskeletal: no edema  Neurology: alert, oriented, no motor deficits  Skin: no rash      Data:    ROUTINE ICU LABS (Last four results)  CMP  Recent Labs   Lab 01/04/24  0746 01/04/24  0219 01/03/24  2115 01/03/24  1726 01/03/24  1244 01/02/24  1734 01/02/24  1410 01/01/24  1738 01/01/24  1654 12/31/23  1736 12/31/23  1645 12/31/23  1220 12/31/23  0847 12/30/23  1153 12/30/23  1050   NA  --   --   --   --  142  --   --   --  142  --   --   --  147*  --  142   POTASSIUM  --   --   --   --  4.0  --  3.8  --  4.0  --  3.9  --  3.3*   < > 3.1*   CHLORIDE  --   --   --   --  102  --   --   --  100  --   --   --  103  --  99   CO2  --   --   --   --  34*  --   --   --  34*  --   --   --  40*  --  37*   ANIONGAP  --   --   --   --  6*  --   --   --  8  --   --   --  4*  --  6*   GLC 72 77 127* 123* 126*   < >  --    < > 173*   < >  --    < > 89   < > 153*   BUN  --   --   --   --  12.7  --   --   --  12.1  --   --   --  14.3  --  14.4   CR  --   --   --   --  0.88  --   --   --  0.81  --   --   --  1.02  --  0.93   GFRESTIMATED  --   --   --   --  89  --   --   --  >90  --   --   --  76  --  85   KARLIE  --   --   --   --  8.9  --   --   --  9.1  --   --   --  8.8  --  8.6*    < > = values in this interval not displayed.     CBC  Recent Labs   Lab 01/01/24  1654 12/29/23  1255    238     INRNo lab results found in last 7 days.  Arterial Blood GasNo lab results found in last 7 days.    No results found for this or any previous visit (from the past 24 hour(s)).

## 2024-01-04 NOTE — PLAN OF CARE
Summary: acute on chronic hypoxic respiratory failure d/t COPD exacerbation.   DATE & TIME: 01/03/24 1212-9821  Cognitive Concerns/ Orientation : A/Ox4, forgetful, more fatigued this evening  BEHAVIOR & AGGRESSION TOOL COLOR: Green   ABNL VS/O2: VSS on 1L NC, needed more O2 with ambulation in [place] in AM. Up to 4L  MOBILITY: IND with walker and oxygen; up in chair for dinner  PAIN MANAGMENT: low back pain - on scheduled tylenol, lidocaine patch removed  DIET: Regular, tolerating  BOWEL/BLADDER: Patient bladder scanned 665, then voided 200cc .  Straight cath performed with coude cath and 600 ml yellow urine returned.  ABNL LAB/BG: , 127  DRAIN/DEVICES: R AC PIV SL  SKIN: Scattered bruising and scabs, jose j bilateral lower extremities. Abrasion to R. Knee.   TESTS/PROCEDURES: none this shift  D/C DATE: Pending improvement. Pulmonary following, still declining TCU  OTHER IMPORTANT INFO: on scheduled nebs and chest therapy, pt wanted daughter updated by pulmonology - page sent

## 2024-01-04 NOTE — PLAN OF CARE
Summary: acute on chronic hypoxic respiratory failure d/t COPD exacerbation   DATE & TIME: 1/4/2024 8393-4387  Cognitive Concerns/ Orientation: Alert/Oriented x 4, forgetful   BEHAVIOR & AGGRESSION TOOL COLOR: Green             ABNL VS/O2: VSS on RA-1 LPM oxygen via NC increased O2 needs with exertion; Baseline PRN oxygen use; Coarse + inspiratory/expiratory wheezing at times; Scheduled nebs and chest physiotherapy (goal sats >88). ACEVES.   MOBILITY: Independent with walker/O2, up to chair, steady on feet. Ambulated x2 (needs to walk TID)  PAIN MANAGMENT: Chronic low back pain - on scheduled tylenol/PRN oxycodone x1; lidocaine patch declined, stated it doesn't help  DIET: Regular- tolerating with good appetite  BOWEL/BLADDER: Continent- using urinal. PVR <300 this shift. MD aware we had to straight cath x2 last 24hrs  ABNL LAB/BG: BG 72, 92  DRAIN/DEVICES: R PIV SL  SKIN: Scattered bruising/scabs; Bilateral lower extremities jose j;  Abrasion/scab to R knee   TESTS/PROCEDURES: none  D/C DATE: Pending improvement, prefers Home versus TCU  OTHER IMPORTANT INFO: Pulmonology following--follow up outpt. Palliative consulted.

## 2024-01-04 NOTE — PLAN OF CARE
Summary: acute on chronic hypoxic respiratory failure d/t COPD exacerbation     DATE & TIME: 01/0/03/24 [id number]  Cognitive Concerns/ Orientation: Alert/Oriented x 4, forgetful   BEHAVIOR & AGGRESSION TOOL COLOR: Green             ABNL VS/O2: VSS, 1 LPM oxygen via nasal cannula with O2 sats mid 90s; increased O2 needs with exertion; Baseline PRN oxygen use; Coarse + inspiratory/expiratory wheezing; Scheduled nebs an chest physiotherapy  MOBILITY: Independent with walker/O2  PAIN MANAGMENT: Chronic low back pain - on scheduled tylenol/PRN oxycodone/flexeril given x1; lidocaine patch removed on evening  DIET: Regular  BOWEL/BLADDER: Continent- using urinal  ABNL LAB/BG: BG 77  DRAIN/DEVICES: R PIV SL  SKIN: Scattered bruising/scabs; Bilateral lower extremities jose j;  Abrasion/scab to R knee   TESTS/PROCEDURES: none  D/C DATE: Pending improvement, prefers Home versus TCU  OTHER IMPORTANT INFO: Pulmonology following--follow up outpt

## 2024-01-04 NOTE — CONSULTS
Palliative Care Consultation Note  [facility]      Patient: Harrison Thomas  Date of Admission:  12/6/2023    Requesting Clinician / Team: Dr. Guillory  Reason for consult: Goals of care     Recommendations & Counseling     GOALS OF CARE:  Harrison remains very insistent that he return home after the hospitalization.  He is not interested in TCU, despite therapy/medical team recommendations  We thoroughly discussed the risk of returning to the home setting, including risk of discomfort, a fall with a serious injury, health setbacks that brings him back to the hospital, or his death  Harrison recognizes that his lungs are very sick and that he may be in his dying process  We discussed a hospice plan of care as an option for returning to the home setting.  Although he was initially interested in doing some PT, he is considering the option of hospice.  He will further discuss with his daughter Janey in the coming days.  I offered to call his daughter to review this information with her.  Harrison declined and stated he would prefer to talk with her independently.  I am available to revisit this conversation with Harrison tomorrow or into next week    ADVANCE CARE PLANNING:  Patient has an advance directive dated 3/2023.  Primary Health Care Agent wife Adeola.  Alternate(s) none.   There is no POLST form on file, recommend to complete prior to DC.  Code status: No CPR- Do NOT Intubate    MEDICAL MANAGEMENT:  Briefly discussed the role of comfort medications with the hospice plan of care.  He is aware that he does have low-dose opioids available for his comfort (oxycodone 5 mg p.o. every 4 hours as needed pain)    PSYCHOSOCIAL/SPIRITUAL SUPPORT:  Family -daughter Janey involved.  She called the RN and asked to be a part of this conversation, while I was in the room talking with Harrison.  Harrison had declined me calling Janey today.  Rather he will speak with her in the coming days.    Palliative Care will  continue to follow. Thank you for the consult and allowing us to aid in the care of Harrison Thomas.    These recommendations have been discussed with Dr. Guillory, Dr. Ramires, bedside RN Kylah.    Chart documentation was completed, in part, with [employer] voice-recognition software. Even though reviewed, some grammatical, spelling, and word errors may remain.     PREETI Mohan CNP  Securely message with [employer] (more info)  Text page via [facility] Paging/Directory     Palliative Summary/HPI     Harrison Thomas is a 78 year old male with a past medical history significant for chronic hypoxic respiratory failure [has oxygen as needed at home], COPD, RUL lung mass s/p radiation, untreated BRENNEN, HTN, HLD, CAD, and hypothyroidism admitted on 12/6/2023 with acute on chronic hypoxic respiratory failure likely 2/2 COPD exacerbation and hospital acquired PNA. It has been a protracted course with subjective breathlessness/tachypnea and profound deconditioning despite optimization of his COPD.     Today, the patient was seen for:  Goals of care    Palliative Care Summary:   Met with Harrison.     I introduced our role as an extra layer of support and how we help patients and families dealing with serious, potentially life-limiting illnesses. I explained the composition of the palliative care team.  Palliative care helps patients and families navigate their care while focusing on the whole person; providing emotional, social and spiritual support  Palliative care often assists with symptom management, information sharing about what to expect from the illness, available treatment options and what effect those options may have on the disease course, and provide effective communication and caring support.    Prognosis, Goals, & Planning:    Functional Status just prior to this current hospitalization:  Moderate COPD has O2 at home, lives independently    Prognosis, Goals, and/or Advance Care Planning:  Advance Care  Planning Discussion 1/4/2024. ILakeshia APRN CNP met with Patient today at the hospital to discuss Advance Care Planning. Harrison Thomas does have decisional capacity and was present for this discussion. Those present were informed of the voluntary nature of this discussion and wished to proceed. This discussion began at 1354 and ended at 1417 for a total of 24 minutes.    We discussed general treatment options (full/restorative, selective/conservatives, and comfort only/hospice). We then discussed how these specifically apply to Harrison.   Harrison remains very insistent that he return home after the hospitalization.  He is not interested in TCU, despite therapy/medical team recommendations  We thoroughly discussed the risk of returning to the home setting, including risk of discomfort, a fall with a serious injury, health setbacks that brings him back to the hospital, or his death  Harrison recognizes that his lungs are very sick and that he may be in his dying process  Education provided regarding hospice philosophy, prognostic, and eligibility criteria. Discussed what services are provided and those that are not. Discussed common misconceptions. We explored the various disposition options where they can receive hospice care (home, residential hospice homes, LTC with hospice) including subsequent financial and familial implications.  Based on this discussion, Harrison will further discuss with his daughter Janey in the coming days.  I offered to call his daughter to review this information with her.  Harrison declined and stated he would prefer to talk with her independently.      Code Status was addressed today:   No already DNR/DNI    Patient's decision making preferences: shared with support from loved ones        Patient has decision-making capacity today for complex decisions:Intact          Coping, Meaning, & Spirituality:   Mood, coping, and/or meaning in the context of serious illness were addressed  today: Yes    Social:   Living situation:lives alone in [place]   Important relationships/caregivers: Daughter Janey    Medications:  I have reviewed this patient's medication profile and medications from this hospitalization. Notable medications:  APAP 1 g p.o. 3 times daily  Mucomyst nebulizer  Norvasc  Aspirin  Azithromycin  Cymbalta 20 mg p.o. twice daily  Gabapentin 400 mg p.o. 3 times daily  Insulin  DuoNebs  Synthroid  Lidoderm patches  Metoprolol  PPI  MiraLAX twice daily  Prednisone taper  Crestor  Senna 2 tablets p.o. twice daily  Flomax  Trazodone 50 mg p.o. nightly  Flexeril 10 mg p.o. every 6 hours as needed muscle spasms  Oxycodone 5 mg p.o. every 4 hours as needed pain    ROS:  Comprehensive ROS is reviewed and is negative except as here & per HPI: N/A    Physical Exam   Vital Signs with Ranges  Temp:  [97.5  F (36.4  C)-98.1  F (36.7  C)] 97.7  F (36.5  C)  Pulse:  [65-95] 65  Resp:  [18-20] 20  BP: (130-167)/(79-89) 167/86  SpO2:  [92 %-98 %] 92 %  177 lbs 11.2 oz  CONSTITUTIONAL: Chronically ill deconditioned man seen resting in bed in NAD although tachypneic and seemingly breathless when talking, A&Ox3. Calm and cooperative.  HEENT: NCAT  RESPIRATORY: Tachypneic respiratory effort on 1L, noted to be out of breath with talking  NEUROLOGIC: Appropriately responsive during interview  PSYCH: Affect engaged    Data reviewed:  Recent imaging independently reviewed, my comments on pertinents:   1/1 CXR with improving infiltrates    Recent lab data independently reviewed, my comments on pertinents:   Na 142  K 4  Creat 0.88  WBC 7  Hgb 13.2  Plt 175  Albumin 3.4  INR 1.09    Medical Decision Making   Please see A&P for additional details of medical decision making.  MANAGEMENT DISCUSSED with the following over the past 24 hours: Dr. Guillory, Dr. Ramires, Kylah RN   NOTE(S)/MEDICAL RECORDS REVIEWED over the past 24 hours: H&P, hospitalist notes, nursing notes, pulmonary notes  Tests personally  interpreted in the past 24 hours:  - CHEST XRAY showing improving infiltrates  Tests REVIEWED in the past 24 hours:  - See lab/imaging results included in the data section of the note  - BMP  - CBC  SUPPLEMENTAL HISTORY, in addition to the patient's history, over the past 24 hours obtained from:   - Dr. Guillory, Kylah Rivero RN  Medical complexity over the past 24 hours:  - Decision to DE-ESCALATE CARE based on prognosis

## 2024-01-05 ENCOUNTER — APPOINTMENT (OUTPATIENT)
Dept: GENERAL RADIOLOGY | Facility: CLINIC | Age: 79
DRG: 190 | End: 2024-01-05
Attending: HOSPITALIST
Payer: MEDICARE

## 2024-01-05 ENCOUNTER — APPOINTMENT (OUTPATIENT)
Dept: PHYSICAL THERAPY | Facility: CLINIC | Age: 79
DRG: 190 | End: 2024-01-05
Payer: MEDICARE

## 2024-01-05 LAB
GLUCOSE BLDC GLUCOMTR-MCNC: 101 MG/DL (ref 70–99)
GLUCOSE BLDC GLUCOMTR-MCNC: 114 MG/DL (ref 70–99)
GLUCOSE BLDC GLUCOMTR-MCNC: 147 MG/DL (ref 70–99)
GLUCOSE BLDC GLUCOMTR-MCNC: 151 MG/DL (ref 70–99)
GLUCOSE BLDC GLUCOMTR-MCNC: 179 MG/DL (ref 70–99)
POTASSIUM SERPL-SCNC: 4.2 MMOL/L (ref 3.4–5.3)

## 2024-01-05 PROCEDURE — 999N000157 HC STATISTIC RCP TIME EA 10 MIN

## 2024-01-05 PROCEDURE — 250N000013 HC RX MED GY IP 250 OP 250 PS 637: Performed by: INTERNAL MEDICINE

## 2024-01-05 PROCEDURE — 250N000011 HC RX IP 250 OP 636: Performed by: HOSPITALIST

## 2024-01-05 PROCEDURE — 250N000013 HC RX MED GY IP 250 OP 250 PS 637: Performed by: HOSPITALIST

## 2024-01-05 PROCEDURE — 94660 CPAP INITIATION&MGMT: CPT

## 2024-01-05 PROCEDURE — 250N000012 HC RX MED GY IP 250 OP 636 PS 637: Performed by: STUDENT IN AN ORGANIZED HEALTH CARE EDUCATION/TRAINING PROGRAM

## 2024-01-05 PROCEDURE — 85007 BL SMEAR W/DIFF WBC COUNT: CPT | Performed by: HOSPITALIST

## 2024-01-05 PROCEDURE — 94799 UNLISTED PULMONARY SVC/PX: CPT

## 2024-01-05 PROCEDURE — 84132 ASSAY OF SERUM POTASSIUM: CPT | Performed by: HOSPITALIST

## 2024-01-05 PROCEDURE — 250N000009 HC RX 250: Performed by: STUDENT IN AN ORGANIZED HEALTH CARE EDUCATION/TRAINING PROGRAM

## 2024-01-05 PROCEDURE — 120N000001 HC R&B MED SURG/OB

## 2024-01-05 PROCEDURE — 36415 COLL VENOUS BLD VENIPUNCTURE: CPT | Performed by: HOSPITALIST

## 2024-01-05 PROCEDURE — 71045 X-RAY EXAM CHEST 1 VIEW: CPT

## 2024-01-05 PROCEDURE — 99232 SBSQ HOSP IP/OBS MODERATE 35: CPT | Performed by: HOSPITALIST

## 2024-01-05 PROCEDURE — 94640 AIRWAY INHALATION TREATMENT: CPT

## 2024-01-05 PROCEDURE — 250N000013 HC RX MED GY IP 250 OP 250 PS 637: Performed by: STUDENT IN AN ORGANIZED HEALTH CARE EDUCATION/TRAINING PROGRAM

## 2024-01-05 PROCEDURE — 85027 COMPLETE CBC AUTOMATED: CPT | Performed by: HOSPITALIST

## 2024-01-05 PROCEDURE — 94640 AIRWAY INHALATION TREATMENT: CPT | Mod: 76

## 2024-01-05 PROCEDURE — 97116 GAIT TRAINING THERAPY: CPT | Mod: GP | Performed by: PHYSICAL THERAPIST

## 2024-01-05 RX ADMIN — ACETAMINOPHEN 1000 MG: 500 TABLET, FILM COATED ORAL at 07:49

## 2024-01-05 RX ADMIN — AZITHROMYCIN DIHYDRATE 500 MG: 250 TABLET ORAL at 09:24

## 2024-01-05 RX ADMIN — IPRATROPIUM BROMIDE AND ALBUTEROL SULFATE 3 ML: .5; 3 SOLUTION RESPIRATORY (INHALATION) at 11:39

## 2024-01-05 RX ADMIN — PANTOPRAZOLE SODIUM 40 MG: 40 TABLET, DELAYED RELEASE ORAL at 07:49

## 2024-01-05 RX ADMIN — ACETYLCYSTEINE 2 ML: 200 SOLUTION ORAL; RESPIRATORY (INHALATION) at 19:12

## 2024-01-05 RX ADMIN — ASPIRIN 81 MG: 81 TABLET, COATED ORAL at 09:23

## 2024-01-05 RX ADMIN — PREDNISONE 20 MG: 20 TABLET ORAL at 09:23

## 2024-01-05 RX ADMIN — GABAPENTIN 400 MG: 100 CAPSULE ORAL at 22:02

## 2024-01-05 RX ADMIN — OXYCODONE HYDROCHLORIDE 5 MG: 5 TABLET ORAL at 09:24

## 2024-01-05 RX ADMIN — DULOXETINE HYDROCHLORIDE 20 MG: 20 CAPSULE, DELAYED RELEASE ORAL at 09:24

## 2024-01-05 RX ADMIN — TRAZODONE HYDROCHLORIDE 50 MG: 50 TABLET ORAL at 22:53

## 2024-01-05 RX ADMIN — ACETAMINOPHEN 1000 MG: 500 TABLET, FILM COATED ORAL at 22:02

## 2024-01-05 RX ADMIN — IPRATROPIUM BROMIDE AND ALBUTEROL SULFATE 3 ML: .5; 3 SOLUTION RESPIRATORY (INHALATION) at 08:07

## 2024-01-05 RX ADMIN — DOCUSATE SODIUM 50 MG AND SENNOSIDES 8.6 MG 2 TABLET: 8.6; 5 TABLET, FILM COATED ORAL at 09:24

## 2024-01-05 RX ADMIN — ENOXAPARIN SODIUM 40 MG: 40 INJECTION SUBCUTANEOUS at 12:56

## 2024-01-05 RX ADMIN — OXYCODONE HYDROCHLORIDE 5 MG: 5 TABLET ORAL at 22:53

## 2024-01-05 RX ADMIN — ROSUVASTATIN CALCIUM 10 MG: 10 TABLET, FILM COATED ORAL at 22:02

## 2024-01-05 RX ADMIN — ACETYLCYSTEINE 2 ML: 200 SOLUTION ORAL; RESPIRATORY (INHALATION) at 08:07

## 2024-01-05 RX ADMIN — AMLODIPINE BESYLATE 5 MG: 5 TABLET ORAL at 22:02

## 2024-01-05 RX ADMIN — ACETYLCYSTEINE 2 ML: 200 SOLUTION ORAL; RESPIRATORY (INHALATION) at 16:03

## 2024-01-05 RX ADMIN — ACETAMINOPHEN 1000 MG: 500 TABLET, FILM COATED ORAL at 13:09

## 2024-01-05 RX ADMIN — IPRATROPIUM BROMIDE AND ALBUTEROL SULFATE 3 ML: .5; 3 SOLUTION RESPIRATORY (INHALATION) at 19:12

## 2024-01-05 RX ADMIN — INSULIN ASPART 1 UNITS: 100 INJECTION, SOLUTION INTRAVENOUS; SUBCUTANEOUS at 17:54

## 2024-01-05 RX ADMIN — GABAPENTIN 400 MG: 100 CAPSULE ORAL at 17:18

## 2024-01-05 RX ADMIN — CYCLOBENZAPRINE 10 MG: 10 TABLET, FILM COATED ORAL at 22:53

## 2024-01-05 RX ADMIN — GABAPENTIN 400 MG: 100 CAPSULE ORAL at 09:23

## 2024-01-05 RX ADMIN — ACETYLCYSTEINE 2 ML: 200 SOLUTION ORAL; RESPIRATORY (INHALATION) at 11:38

## 2024-01-05 RX ADMIN — DULOXETINE HYDROCHLORIDE 20 MG: 20 CAPSULE, DELAYED RELEASE ORAL at 22:02

## 2024-01-05 RX ADMIN — IPRATROPIUM BROMIDE AND ALBUTEROL SULFATE 3 ML: .5; 3 SOLUTION RESPIRATORY (INHALATION) at 16:03

## 2024-01-05 RX ADMIN — ASPIRIN 81 MG: 81 TABLET, COATED ORAL at 22:02

## 2024-01-05 RX ADMIN — LEVOTHYROXINE SODIUM 125 MCG: 125 TABLET ORAL at 09:24

## 2024-01-05 RX ADMIN — METOPROLOL SUCCINATE 50 MG: 50 TABLET, EXTENDED RELEASE ORAL at 22:02

## 2024-01-05 RX ADMIN — TAMSULOSIN HYDROCHLORIDE 0.4 MG: 0.4 CAPSULE ORAL at 09:23

## 2024-01-05 ASSESSMENT — ACTIVITIES OF DAILY LIVING (ADL)
ADLS_ACUITY_SCORE: 33

## 2024-01-05 NOTE — PROGRESS NOTES
[facility]    Medicine Progress Note - Hospitalist Service    Date of Admission:  12/6/2023    Assessment & Plan    Harrison Thomas is a 78 year old male with medical history significant for chronic hypoxic respiratory failure [has oxygen as needed at home], COPD, RUL lung mass s/p radiation, untreated BRENNEN, HTN, HLD, CAD, hypothyroidism admitted on 12/6/2023 with acute on chronic hypoxic respiratory failure likely 2/2 COPD exacerbation.      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  BRENNEN   Pulmonary edema  *Follows with Dr. Handy with pulmonology at [facility], last seen in September 2023.  On low dose chronic prednisone (2mg).  *Patient presented to the ED with increased shortness of breath over the past week, increased oxygen use at home.  *Admission clinical picture consistent with COPD exacerbation - poor air movement with diffuse expiratory wheezing - placed on steroids and nebs.  *BNP and procalcitonin wnl, COVID/flu/RSV negative on admission, resp viral panel negative (12/15).  *CT pulm angio 12/11 negative for PE, shows mild bronchial wall thickening and extensive emphysema.  *Underwent sniff test this admission that was negative.  *Received 5-day treatment course azithromycin at admission, resumed  mg every other day dosing for prophylaxis.  *Intermittently has required high flow nasal cannula and BiPAP  *[facility] pulmonary following.  *Transitioned from IV solumedrol to prednisone 12/20.  -Continue pantoprazole until tapered back to PTA dose of prednisone.  -Duonebs qid.  -Home inhaler on hold.  -Respiratory culture obtained 12/22/23, grew 1+ Candida albicans.  -[facility] pulmonary following, discussed with Dr. Connor on 12/29.  Has received empiric intermittent IV diuretics for potential volume overload with inconsistent results.  -Hypoxia improving, patient  down to 1 L oxygen via nasal cannula however continues to be severely short of breath with minimal activity  -As per pulmonary recommendations started on MetaNebs yesterday, RT following  -Prior hospitalist discussed with daughter on the phone and with the patient at the bedside.  He is still opposed to going to transitional care.  He has had a prolonged hospitalization and respiratory status is still not stable.  He is going to be superhigh risk for readmission with his tenuous pulmonary status.  I discussed palliative care evaluation which he is thinking about however he would like to discuss with pulmonary tomorrow to see if there are any other options for improvement.  -Would appreciate reevaluation by pulmonary tomorrow to discuss any further options for respiratory improvement and goals of care.  -Clinical exam concerning for volume overload again, he is very crackly.  Will give Lasix 40 mg IV x 1  -Repeat chest x-ray  1/1/24 reveals nearly resolved right upper lobe opacity, and improved bibasilar opacities consistent with improving infection/inflammation. Cardiomediastinal silhouette is unremarkable.    -palliative consult to assist with goals of care.  Patient considering discharging home with hospice however he has not made a decision yet he will like to speak with his daughter regarding this.  -Repeat chest x-ray on 1/5/24 -follow-up results.        Hypernatremia -resolved  -Sodium now within normal limits.     Hypertension   Hyperlipidemia  Coronary artery disease  -Continue PTA metoprolol, amlodipine, aspirin, rosuvastatin     Chronic low back pain   *Patient is supposed to be getting a lumbar MRI soon.  Can be done as an outpatient.  -Continue PTA oxycodone.  -Gabapentin changed from 600 mg bid to 400 mg tid, continue.  -Hold naproxen for now in the setting of high-dose steroids since he would be at high risk for upper GI bleed and ulcers.  -Oral PPI for prophylaxis.  -Continue PRN Flexeril.  -Scheduled  "Tylenol tid.  -Lidocaine patches.     Constipation, resolved  *Given concern for abdominal distention, obtained abdominal x-ray on 12/18 that still showed large amount of stool throughout the colon.  Started having bowel movement on 12/19/23 and has been having at least daily bowel movements since then.  -Continue on aggressive bowel regimen.     Acute urinary retention-resolved  *Required straight cath x3 12/20-12/21 and martinez placed.  -Started flomax 12/21/23.  -successful trial of voiding on 12/23/23.   -Continue to monitor for retention following catheter removal.  -Aggressively treat constipation as above.     Hx of papillary thyroid cancer s/p thyroidectomy   Hypothyroidism   -Continue PTA levothyroxine.     Insomnia   -Continue PTA Trazodone.     Leukocytosis  -Related to steroids and pneumonia.  -WBC back to normal.     Thrombocytopenia, resolved  Platelets in low 100s.  Not on any heparin products on admission through 12/20.  He is on baby aspirin twice daily, no signs of bleeding.  -Platelets 134 on 12/16/23, slowly trended down but now resolved  -Lovenox prophylaxis started 12/21/23 as platelets have trended up and are back to normal.     Bilateral pulmonary nodules  CT chest 12/11 showing scattered small bilateral pulmonary nodules measuring up to 5 mm are unchanged.  -Continued surveillance recommended.          Diet: Regular Diet Adult    DVT Prophylaxis: Enoxaparin (Lovenox) SQ  Martinez Catheter: Not present  Lines: None     Cardiac Monitoring: None  Code Status: No CPR- Do NOT Intubate      Clinically Significant Risk Factors              # Hypoalbuminemia: Lowest albumin = 3.4 g/dL at 12/8/2023 11:58 AM, will monitor as appropriate     # Hypertension: Noted on problem list        # Overweight: Estimated body mass index is 25.53 kg/m  as calculated from the following:    Height as of this encounter: 1.778 m (5' 10\").    Weight as of this encounter: 80.7 kg (177 lb 14.6 oz).             Disposition Plan " at this time, patient is stable for discharge however he is insisting on discharging home despite TCU being recommended.  Discharging home will not be a safe option given his extensive COPD hypoxia and deconditioning.  Palliative has been consulted to assist with goals of care and to assist with discharge plans at this time.     Expected Discharge Date: 01/06/2024, 12:00 PM      Discharge Comments: pt is on iv solumedrol            May Wolfe MD  Hospitalist Service  [facility]  Securely message with [email or web address] (more info)  Text page via [employer] Paging/Directory   ______________________________________________________________________    Interval History   Patient notes he feels much better today. Breathing is improved but not optimal    Physical Exam   Vital Signs: Temp: 97.9  F (36.6  C) Temp src: Oral BP: (!) 170/92 Pulse: 74   Resp: 18 SpO2: 94 % O2 Device: Nasal cannula Oxygen Delivery: 1 LPM  Weight: 177 lbs 14.58 oz    General Appearance: Well appearing for stated age.  Respiratory: CTAB, no rales or ronchi  Cardiovascular: S1, S2 normal, no murmurs  GI: non-tender on palpation, BS present      Medical Decision Making       55 MINUTES SPENT BY ME on the date of service doing chart review, history, exam, documentation & further activities per the note.      Data     I have personally reviewed the following data over the past 24 hrs:    N/A  \   N/A   / N/A     N/A N/A N/A /  114 (H)   4.2 N/A N/A \       Imaging results reviewed over the past 24 hrs:   Recent Results (from the past 24 hour(s))   XR Chest Port 1 View    Narrative    CHEST ONE VIEW  1/5/2024 3:32 PM     HISTORY: Hypoxia.    COMPARISON: Chest radiograph 1/1/2024.      Impression    IMPRESSION: New platelike opacity within the right upper lung (similar  to 12/23/2023) could reflect infectious/inflammatory process versus  platelike atelectasis. Similar blunting of the right costophrenic  angle, likely scarring/pleural  thickening. No left pleural effusion.  No convincing pneumothorax. Unchanged cardiomediastinal silhouette.    JENNIFER ARREDONDO MD         SYSTEM ID:  [id number]

## 2024-01-05 NOTE — PROGRESS NOTES
Care Management Follow Up    Length of Stay (days): 30    Expected Discharge Date: pending     Concerns to be Addressed: discharge planning     Patient plan of care discussed at interdisciplinary rounds: Yes    Anticipated Discharge Disposition: Home Care vs TCU     Anticipated Discharge Services:  rehab vs Palliative approach  Anticipated Discharge DME: Oxygen    Patient/family educated on Medicare website which has current facility and service quality ratings: yes  Education Provided on the Discharge Plan: Yes  Patient/Family in Agreement with the Plan: yes    Referrals Placed by CM/SW: Homecare  Private pay costs discussed: Not applicable    Additional Information:  Hospital day 30.  Palliative had met with patient yesterday and patient wanted to have further discussion with his Daughter Janey saucedo making any real decisions on goals of care.    Received call from Janey earlier today.  She reports she conversed with Pulmonary and it was felt that patient definitely needed to transition to a TCU.  Patient had been against this.  Therapies are recommending home with outpatient Pulmonary Rehab.  Janey arrived as Care Coordinator was leaving.  She wanted to talk with Saumya Warren CNP.  Unfortunately, Saumya is gone for day and will be back on Monday.  Janey conversed with her father and he is now in agreement for TCU and wants [facility].  I know [facility] is not taking any admissions over the weekend and it is unknown if patient will be ready to discharge over the weekend.  Janey plans to converse with the SW tomorrow.  Reportedly, he likes [facility] and has had a bad experience at another facility.     Liliana Calero, RN  Inpatient Care Management  [phone]

## 2024-01-05 NOTE — PROVIDER NOTIFICATION
MD Notification    Notified Person: MD    Notified Person Name: Dr. Aiden Garvey    Notification Date/Time: 1/5/23, 1316    Notification Interaction: curly    Purpose of Notification: Hi, 613's IV dislodge from his arm. Do you want me to replace or is he okay w/out one. He currently is not getting any IV meds. Thanks     Orders Received: no IV okay for now    Comments:

## 2024-01-05 NOTE — PROGRESS NOTES
[facility]    Medicine Progress Note - Hospitalist Service    Date of Admission:  12/6/2023    Assessment & Plan    Harrison Thomas is a 78 year old male with medical history significant for chronic hypoxic respiratory failure [has oxygen as needed at home], COPD, RUL lung mass s/p radiation, untreated BRENNEN, HTN, HLD, CAD, hypothyroidism admitted on 12/6/2023 with acute on chronic hypoxic respiratory failure likely 2/2 COPD exacerbation.      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  BRENNEN   Pulmonary edema  *Follows with Dr. Handy with pulmonology at [facility], last seen in September 2023.  On low dose chronic prednisone (2mg).  *Patient presented to the ED with increased shortness of breath over the past week, increased oxygen use at home.  *Admission clinical picture consistent with COPD exacerbation - poor air movement with diffuse expiratory wheezing - placed on steroids and nebs.  *BNP and procalcitonin wnl, COVID/flu/RSV negative on admission, resp viral panel negative (12/15).  *CT pulm angio 12/11 negative for PE, shows mild bronchial wall thickening and extensive emphysema.  *Underwent sniff test this admission that was negative.  *Received 5-day treatment course azithromycin at admission, resumed  mg every other day dosing for prophylaxis.  *Intermittently has required high flow nasal cannula and BiPAP  *[facility] pulmonary following.  *Transitioned from IV solumedrol to prednisone 12/20.  -Continue pantoprazole until tapered back to PTA dose of prednisone.  -Duonebs qid.  -Home inhaler on hold.  -Respiratory culture obtained 12/22/23, grew 1+ Candida albicans.  -[facility] pulmonary following, discussed with Dr. Connor on 12/29.  Has received empiric intermittent IV diuretics for potential volume overload with inconsistent results.  -Hypoxia improving, patient  down to 1 L oxygen via nasal cannula however continues to be severely short of breath with minimal activity  -As per pulmonary recommendations started on MetaNebs yesterday, RT following  -Prior hospitalist discussed with daughter on the phone and with the patient at the bedside.  He is still opposed to going to transitional care.  He has had a prolonged hospitalization and respiratory status is still not stable.  He is going to be superhigh risk for readmission with his tenuous pulmonary status.  I discussed palliative care evaluation which he is thinking about however he would like to discuss with pulmonary tomorrow to see if there are any other options for improvement.  -Would appreciate reevaluation by pulmonary tomorrow to discuss any further options for respiratory improvement and goals of care.  -Clinical exam concerning for volume overload again, he is very crackly.  Will give Lasix 40 mg IV x 1  -Repeat chest x-ray  1/1/24 reveals nearly resolved right upper lobe opacity, and improved bibasilar opacities consistent with improving infection/inflammation. Cardiomediastinal silhouette is unremarkable.    -palliative consult        Hypernatremia  -Sodium slightly up to 147 today, suspect due to diuretics and decreased free water intake  -Still awaiting labs from this morning.     Hypertension   Hyperlipidemia  Coronary artery disease  -Continue PTA metoprolol, amlodipine, aspirin, rosuvastatin     Chronic low back pain   *Patient is supposed to be getting a lumbar MRI soon.  Can be done as an outpatient.  -Continue PTA oxycodone.  -Gabapentin changed from 600 mg bid to 400 mg tid, continue.  -Hold naproxen for now in the setting of high-dose steroids since he would be at high risk for upper GI bleed and ulcers.  -Oral PPI for prophylaxis.  -Continue PRN Flexeril.  -Scheduled Tylenol tid.  -Lidocaine patches.     Constipation, resolved  *Given concern for abdominal distention, obtained abdominal x-ray on 12/18  "that still showed large amount of stool throughout the colon.  Started having bowel movement on 12/19/23 and has been having at least daily bowel movements since then.  -Continue on aggressive bowel regimen.     Acute urinary retention-resolved  *Required straight cath x3 12/20-12/21 and martinez placed.  -Started flomax 12/21/23.  -successful trial of voiding on 12/23/23.   -Continue to monitor for retention following catheter removal.  -Aggressively treat constipation as above.     Hx of papillary thyroid cancer s/p thyroidectomy   Hypothyroidism   -Continue PTA levothyroxine.     Insomnia   -Continue PTA Trazodone.     Leukocytosis  -Related to steroids and pneumonia.  -WBC back to normal.     Thrombocytopenia, resolved  Platelets in low 100s.  Not on any heparin products on admission through 12/20.  He is on baby aspirin twice daily, no signs of bleeding.  -Platelets 134 on 12/16/23, slowly trended down but now resolved  -Lovenox prophylaxis started 12/21/23 as platelets have trended up and are back to normal.     Bilateral pulmonary nodules  CT chest 12/11 showing scattered small bilateral pulmonary nodules measuring up to 5 mm are unchanged.  -Continued surveillance recommended.          Diet: Regular Diet Adult    DVT Prophylaxis: Enoxaparin (Lovenox) SQ  Martinez Catheter: Not present  Lines: None     Cardiac Monitoring: None  Code Status: No CPR- Do NOT Intubate      Clinically Significant Risk Factors              # Hypoalbuminemia: Lowest albumin = 3.4 g/dL at 12/8/2023 11:58 AM, will monitor as appropriate     # Hypertension: Noted on problem list        # Overweight: Estimated body mass index is 25.5 kg/m  as calculated from the following:    Height as of this encounter: 1.778 m (5' 10\").    Weight as of this encounter: 80.6 kg (177 lb 11.2 oz).             Disposition Plan      Expected Discharge Date: 01/05/2024, 12:00 PM      Discharge Comments: pt is on iv solumedrol            May Wolfe MD  Hospitalist " [facility]  Securely message with [employer] (more info)  Text page via [employer] Paging/Directory   ______________________________________________________________________    Interval History   Patient notes he feels much better today. Breathing is improved but not optimal    Physical Exam   Vital Signs: Temp: 97.8  F (36.6  C) Temp src: Oral BP: (!) 148/79 Pulse: 83   Resp: 18 SpO2: 94 % O2 Device: Nasal cannula Oxygen Delivery: 1 LPM  Weight: 177 lbs 11.2 oz    General Appearance: Well appearing for stated age.  Respiratory: CTAB, no rales or ronchi  Cardiovascular: S1, S2 normal, no murmurs  GI: non-tender on palpation, BS present      Medical Decision Making       55 MINUTES SPENT BY ME on the date of service doing chart review, history, exam, documentation & further activities per the note.      Data     I have personally reviewed the following data over the past 24 hrs:    N/A  \   N/A   / 175     N/A N/A N/A /  179 (H)   N/A N/A N/A \       Imaging results reviewed over the past 24 hrs:   No results found for this or any previous visit (from the past 24 hour(s)).

## 2024-01-05 NOTE — PLAN OF CARE
Summary: acute on chronic hypoxic respiratory failure d/t COPD exacerbation     DATE & TIME: 01/04/24 1427-0381  Cognitive Concerns/ Orientation: Alert/Oriented x 4, forgetful   BEHAVIOR & AGGRESSION TOOL COLOR: Green             ABNL VS/O2: VSS, 1 LPM oxygen via nasal cannula with O2 sats mid 90s; increased O2 needs with exertion; Baseline PRN oxygen use; Coarse + inspiratory/expiratory wheezing; Scheduled nebs an chest physiotherapy  MOBILITY: Independent with walker/O2  PAIN MANAGMENT: Chronic low back pain - on scheduled tylenol/PRN oxycodone/flexeril   DIET: Regular  BOWEL/BLADDER: Continent- using urinal; Bladder scan 375 refusing straight catheterization  ABNL LAB/BG:   DRAIN/DEVICES: R PIV SL  SKIN: Scattered bruising/scabs; Bilateral lower extremities jose j; Abrasion/scab to R knee   TESTS/PROCEDURES: none  D/C DATE: Pending improvement, prefers Home versus TCU  OTHER IMPORTANT INFO: Seen by palliative-pt will speak with daughter

## 2024-01-05 NOTE — PLAN OF CARE
Summary: acute on chronic hypoxic respiratory failure d/t COPD exacerbation   DATE & TIME: 1/4/2024 4802-1637  Cognitive Concerns/ Orientation: Alert/Oriented x 4, forgetful   BEHAVIOR & AGGRESSION TOOL COLOR: Green             ABNL VS/O2: VSS on RA-1 LPM oxygen via NC, increased O2 needs with exertion; Baseline PRN oxygen use; Coarse + expiratory wheezing at times; Scheduled nebs and chest physiotherapy (goal sats >88). ACEVES.   MOBILITY: Independent with walker/O2, up to chair, steady on feet. Ambulated x2 in AM, declined ambulation this evennig (needs to walk TID)  PAIN MANAGMENT: Chronic low back pain - on scheduled tylenol/PRN oxycodone x1; lidocaine patch declined,   DIET: Regular- tolerating with good appetite  BOWEL/BLADDER: Continent- using urinal. Voided 550,  this shift. Declined straight cath, requested it be pushed back till later after he voids again, MD aware we had to straight cath x2 [id number]  ABNL LAB/BG: , 237  DRAIN/DEVICES: R PIV SL  SKIN: Scattered bruising/scabs; Bilateral lower extremities jose j;  Abrasion/scab to R knee   TESTS/PROCEDURES: none  D/C DATE: Pending improvement, prefers Home versus TCU  OTHER IMPORTANT INFO: Pulmonology following--follow up outpt. Palliative consulted,

## 2024-01-06 LAB
BASOPHILS # BLD AUTO: ABNORMAL 10*3/UL
BASOPHILS # BLD MANUAL: 0.1 10E3/UL (ref 0–0.2)
BASOPHILS NFR BLD AUTO: ABNORMAL %
BASOPHILS NFR BLD MANUAL: 1 %
CREAT SERPL-MCNC: 0.78 MG/DL (ref 0.67–1.17)
EGFRCR SERPLBLD CKD-EPI 2021: >90 ML/MIN/1.73M2
EOSINOPHIL # BLD AUTO: ABNORMAL 10*3/UL
EOSINOPHIL # BLD MANUAL: 0.1 10E3/UL (ref 0–0.7)
EOSINOPHIL NFR BLD AUTO: ABNORMAL %
EOSINOPHIL NFR BLD MANUAL: 1 %
ERYTHROCYTE [DISTWIDTH] IN BLOOD BY AUTOMATED COUNT: 14.4 % (ref 10–15)
GLUCOSE BLDC GLUCOMTR-MCNC: 100 MG/DL (ref 70–99)
GLUCOSE BLDC GLUCOMTR-MCNC: 123 MG/DL (ref 70–99)
GLUCOSE BLDC GLUCOMTR-MCNC: 139 MG/DL (ref 70–99)
GLUCOSE BLDC GLUCOMTR-MCNC: 150 MG/DL (ref 70–99)
GLUCOSE BLDC GLUCOMTR-MCNC: 163 MG/DL (ref 70–99)
GLUCOSE BLDC GLUCOMTR-MCNC: 174 MG/DL (ref 70–99)
GLUCOSE BLDC GLUCOMTR-MCNC: 196 MG/DL (ref 70–99)
GLUCOSE BLDC GLUCOMTR-MCNC: 51 MG/DL (ref 70–99)
HCT VFR BLD AUTO: 36.2 % (ref 40–53)
HGB BLD-MCNC: 11.5 G/DL (ref 13.3–17.7)
IMM GRANULOCYTES # BLD: ABNORMAL 10*3/UL
IMM GRANULOCYTES NFR BLD: ABNORMAL %
LYMPHOCYTES # BLD AUTO: ABNORMAL 10*3/UL
LYMPHOCYTES # BLD MANUAL: 0.9 10E3/UL (ref 0.8–5.3)
LYMPHOCYTES NFR BLD AUTO: ABNORMAL %
LYMPHOCYTES NFR BLD MANUAL: 14 %
MCH RBC QN AUTO: 29.5 PG (ref 26.5–33)
MCHC RBC AUTO-ENTMCNC: 31.8 G/DL (ref 31.5–36.5)
MCV RBC AUTO: 93 FL (ref 78–100)
METAMYELOCYTES # BLD MANUAL: 0.2 10E3/UL
METAMYELOCYTES NFR BLD MANUAL: 4 %
MONOCYTES # BLD AUTO: ABNORMAL 10*3/UL
MONOCYTES # BLD MANUAL: 0.2 10E3/UL (ref 0–1.3)
MONOCYTES NFR BLD AUTO: ABNORMAL %
MONOCYTES NFR BLD MANUAL: 4 %
MYELOCYTES # BLD MANUAL: 0.1 10E3/UL
MYELOCYTES NFR BLD MANUAL: 1 %
NEUTROPHILS # BLD AUTO: ABNORMAL 10*3/UL
NEUTROPHILS # BLD MANUAL: 4.6 10E3/UL (ref 1.6–8.3)
NEUTROPHILS NFR BLD AUTO: ABNORMAL %
NEUTROPHILS NFR BLD MANUAL: 75 %
NRBC # BLD AUTO: 0 10E3/UL
NRBC BLD AUTO-RTO: 0 /100
PLAT MORPH BLD: ABNORMAL
PLATELET # BLD AUTO: 175 10E3/UL (ref 150–450)
POTASSIUM SERPL-SCNC: 3.6 MMOL/L (ref 3.4–5.3)
RBC # BLD AUTO: 3.9 10E6/UL (ref 4.4–5.9)
RBC MORPH BLD: ABNORMAL
WBC # BLD AUTO: 6.1 10E3/UL (ref 4–11)

## 2024-01-06 PROCEDURE — 94640 AIRWAY INHALATION TREATMENT: CPT | Mod: 76

## 2024-01-06 PROCEDURE — 250N000013 HC RX MED GY IP 250 OP 250 PS 637: Performed by: HOSPITALIST

## 2024-01-06 PROCEDURE — 94640 AIRWAY INHALATION TREATMENT: CPT

## 2024-01-06 PROCEDURE — 999N000157 HC STATISTIC RCP TIME EA 10 MIN

## 2024-01-06 PROCEDURE — 82565 ASSAY OF CREATININE: CPT | Performed by: HOSPITALIST

## 2024-01-06 PROCEDURE — 99233 SBSQ HOSP IP/OBS HIGH 50: CPT | Performed by: STUDENT IN AN ORGANIZED HEALTH CARE EDUCATION/TRAINING PROGRAM

## 2024-01-06 PROCEDURE — 94799 UNLISTED PULMONARY SVC/PX: CPT

## 2024-01-06 PROCEDURE — 250N000009 HC RX 250: Performed by: HOSPITALIST

## 2024-01-06 PROCEDURE — 250N000011 HC RX IP 250 OP 636: Performed by: HOSPITALIST

## 2024-01-06 PROCEDURE — 250N000013 HC RX MED GY IP 250 OP 250 PS 637: Performed by: INTERNAL MEDICINE

## 2024-01-06 PROCEDURE — 250N000009 HC RX 250: Performed by: STUDENT IN AN ORGANIZED HEALTH CARE EDUCATION/TRAINING PROGRAM

## 2024-01-06 PROCEDURE — 250N000012 HC RX MED GY IP 250 OP 636 PS 637: Performed by: STUDENT IN AN ORGANIZED HEALTH CARE EDUCATION/TRAINING PROGRAM

## 2024-01-06 PROCEDURE — 250N000013 HC RX MED GY IP 250 OP 250 PS 637: Performed by: STUDENT IN AN ORGANIZED HEALTH CARE EDUCATION/TRAINING PROGRAM

## 2024-01-06 PROCEDURE — 120N000001 HC R&B MED SURG/OB

## 2024-01-06 PROCEDURE — 36415 COLL VENOUS BLD VENIPUNCTURE: CPT | Performed by: STUDENT IN AN ORGANIZED HEALTH CARE EDUCATION/TRAINING PROGRAM

## 2024-01-06 PROCEDURE — 84132 ASSAY OF SERUM POTASSIUM: CPT | Performed by: STUDENT IN AN ORGANIZED HEALTH CARE EDUCATION/TRAINING PROGRAM

## 2024-01-06 RX ADMIN — ACETYLCYSTEINE 2 ML: 200 SOLUTION ORAL; RESPIRATORY (INHALATION) at 22:48

## 2024-01-06 RX ADMIN — OXYCODONE HYDROCHLORIDE 5 MG: 5 TABLET ORAL at 22:34

## 2024-01-06 RX ADMIN — GABAPENTIN 400 MG: 100 CAPSULE ORAL at 16:03

## 2024-01-06 RX ADMIN — IPRATROPIUM BROMIDE AND ALBUTEROL SULFATE 3 ML: .5; 3 SOLUTION RESPIRATORY (INHALATION) at 22:48

## 2024-01-06 RX ADMIN — PREDNISONE 20 MG: 20 TABLET ORAL at 08:42

## 2024-01-06 RX ADMIN — ACETYLCYSTEINE 2 ML: 200 SOLUTION ORAL; RESPIRATORY (INHALATION) at 11:48

## 2024-01-06 RX ADMIN — INSULIN ASPART 1 UNITS: 100 INJECTION, SOLUTION INTRAVENOUS; SUBCUTANEOUS at 17:50

## 2024-01-06 RX ADMIN — IPRATROPIUM BROMIDE AND ALBUTEROL SULFATE 3 ML: .5; 3 SOLUTION RESPIRATORY (INHALATION) at 11:48

## 2024-01-06 RX ADMIN — ACETAMINOPHEN 1000 MG: 500 TABLET, FILM COATED ORAL at 13:47

## 2024-01-06 RX ADMIN — IPRATROPIUM BROMIDE AND ALBUTEROL SULFATE 3 ML: .5; 3 SOLUTION RESPIRATORY (INHALATION) at 08:15

## 2024-01-06 RX ADMIN — ACETYLCYSTEINE 2 ML: 200 SOLUTION ORAL; RESPIRATORY (INHALATION) at 19:38

## 2024-01-06 RX ADMIN — DULOXETINE HYDROCHLORIDE 20 MG: 20 CAPSULE, DELAYED RELEASE ORAL at 22:34

## 2024-01-06 RX ADMIN — GABAPENTIN 400 MG: 100 CAPSULE ORAL at 22:33

## 2024-01-06 RX ADMIN — IPRATROPIUM BROMIDE AND ALBUTEROL SULFATE 3 ML: .5; 3 SOLUTION RESPIRATORY (INHALATION) at 19:38

## 2024-01-06 RX ADMIN — ASPIRIN 81 MG: 81 TABLET, COATED ORAL at 22:34

## 2024-01-06 RX ADMIN — ENOXAPARIN SODIUM 40 MG: 40 INJECTION SUBCUTANEOUS at 10:52

## 2024-01-06 RX ADMIN — AMLODIPINE BESYLATE 5 MG: 5 TABLET ORAL at 22:34

## 2024-01-06 RX ADMIN — DULOXETINE HYDROCHLORIDE 20 MG: 20 CAPSULE, DELAYED RELEASE ORAL at 08:41

## 2024-01-06 RX ADMIN — IPRATROPIUM BROMIDE AND ALBUTEROL SULFATE 3 ML: .5; 3 SOLUTION RESPIRATORY (INHALATION) at 03:02

## 2024-01-06 RX ADMIN — CYCLOBENZAPRINE 10 MG: 10 TABLET, FILM COATED ORAL at 22:34

## 2024-01-06 RX ADMIN — TAMSULOSIN HYDROCHLORIDE 0.4 MG: 0.4 CAPSULE ORAL at 08:42

## 2024-01-06 RX ADMIN — ACETYLCYSTEINE 2 ML: 200 SOLUTION ORAL; RESPIRATORY (INHALATION) at 03:02

## 2024-01-06 RX ADMIN — ACETYLCYSTEINE 2 ML: 200 SOLUTION ORAL; RESPIRATORY (INHALATION) at 08:15

## 2024-01-06 RX ADMIN — PANTOPRAZOLE SODIUM 40 MG: 40 TABLET, DELAYED RELEASE ORAL at 06:42

## 2024-01-06 RX ADMIN — ROSUVASTATIN CALCIUM 10 MG: 10 TABLET, FILM COATED ORAL at 22:33

## 2024-01-06 RX ADMIN — LEVOTHYROXINE SODIUM 125 MCG: 125 TABLET ORAL at 08:42

## 2024-01-06 RX ADMIN — METOPROLOL SUCCINATE 50 MG: 50 TABLET, EXTENDED RELEASE ORAL at 22:34

## 2024-01-06 RX ADMIN — TRAZODONE HYDROCHLORIDE 50 MG: 50 TABLET ORAL at 22:33

## 2024-01-06 RX ADMIN — ACETAMINOPHEN 1000 MG: 500 TABLET, FILM COATED ORAL at 06:42

## 2024-01-06 RX ADMIN — ASPIRIN 81 MG: 81 TABLET, COATED ORAL at 08:42

## 2024-01-06 RX ADMIN — GABAPENTIN 400 MG: 100 CAPSULE ORAL at 08:42

## 2024-01-06 RX ADMIN — ACETAMINOPHEN 1000 MG: 500 TABLET, FILM COATED ORAL at 22:33

## 2024-01-06 ASSESSMENT — ACTIVITIES OF DAILY LIVING (ADL)
ADLS_ACUITY_SCORE: 33
ADLS_ACUITY_SCORE: 33
ADLS_ACUITY_SCORE: 35
ADLS_ACUITY_SCORE: 35
ADLS_ACUITY_SCORE: 33
ADLS_ACUITY_SCORE: 33
ADLS_ACUITY_SCORE: 35
ADLS_ACUITY_SCORE: 33
ADLS_ACUITY_SCORE: 35
ADLS_ACUITY_SCORE: 35
ADLS_ACUITY_SCORE: 33
ADLS_ACUITY_SCORE: 33

## 2024-01-06 NOTE — PLAN OF CARE
Summary: acute on chronic hypoxic respiratory failure d/t COPD exacerbation     DATE & TIME: 1/05/24 1900-1/06/24 0730  Cognitive Concerns/ Orientation: A&O x 4, forgetful   BEHAVIOR & AGGRESSION TOOL COLOR: Green             ABNL VS/O2: VSS, on RA, using 1 LPM as needed via nasal cannula when up and moving; increased O2 needs with exertion; Baseline PRN oxygen use; LS with coarse crackles, inspiratory/expiratory wheezing; Scheduled nebs and chest physiotherapy  MOBILITY: Independent with walker/O2.  PAIN MANAGMENT: Chronic low back pain - on scheduled Tylenol/PRN Oxycodone given x1 / PRN Flexerill given x1   DIET: Regular, good appetite  BOWEL/BLADDER: Continent- using urinal; voided adequately; BM x1  ABNL LAB/BG:   DRAIN/DEVICES: NO FER MD aware   SKIN: Scattered bruising/scabs; BLE jose j; abrasion/scab to R knee   TESTS/PROCEDURES: None new  D/C DATE: Will likely discharge to TCU (pt prefers [facility]; [facility] not taking any patients over the weekend).  OTHER IMPORTANT INFO: Palliative following, pt wants to talk to daughter some more.

## 2024-01-06 NOTE — PROGRESS NOTES
Care Management Follow Up    Length of Stay (days): 31    Expected Discharge Date: 01/08/2024     Concerns to be Addressed: discharge planning     Patient plan of care discussed at interdisciplinary rounds: Yes    Anticipated Discharge Disposition: Transitional Care, Home Care     Anticipated Discharge Services:    Anticipated Discharge DME: Oxygen    Patient/family educated on Medicare website which has current facility and service quality ratings: yes  Education Provided on the Discharge Plan: Yes  Patient/Family in Agreement with the Plan: yes    Referrals Placed by CM/SW: Homecare  Private pay costs discussed: Not applicable    Additional Information:  JARAD met with pt and daughter, Janey, per her request. [facility] SW let them know [place] is not currently open to admissions. Provided them with a TCU list and discussed other options. Questions answered. Janey will look over the list. Janey has questions reqarding her father's health and the plan of care. JARAD asked Dr Logan to speak with her this afternoon. JARAD also suggested another meeting with palliative. Janey is available Monday at 3pm. SW following.     KINA Chinchilla, [facility]  [phone] Desk phone  [phone] Cell/text (Preferred)  [facility]

## 2024-01-06 NOTE — PROGRESS NOTES
[facility]    Medicine Progress Note - Hospitalist Service    Date of Admission:  12/6/2023    Assessment & Plan    Harrison Thomas is a 78 year old male with medical history significant for chronic hypoxic respiratory failure [has oxygen as needed at home], COPD, RUL lung mass s/p radiation, untreated BRENNEN, HTN, HLD, CAD, hypothyroidism admitted on 12/6/2023 with acute on chronic hypoxic respiratory failure likely 2/2 COPD exacerbation.      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  BRENNEN   Pulmonary edema  *Follows with Dr. Handy with pulmonology at [facility], last seen in September 2023.  On low dose chronic prednisone (2mg).  *Patient presented to the ED with increased shortness of breath over the past week, increased oxygen use at home.  *Admission clinical picture consistent with COPD exacerbation - poor air movement with diffuse expiratory wheezing - placed on steroids and nebs.  *BNP and procalcitonin wnl, COVID/flu/RSV negative on admission, resp viral panel negative (12/15).  *CT pulm angio 12/11 negative for PE, shows mild bronchial wall thickening and extensive emphysema.  *Underwent sniff test this admission that was negative.  *Received 5-day treatment course azithromycin at admission, resumed  mg every other day dosing for prophylaxis.  *Intermittently has required high flow nasal cannula and BiPAP  *[facility] pulmonary following.  *Transitioned from IV solumedrol to prednisone 12/20.  -Continue pantoprazole until tapered back to PTA dose of prednisone.  -Duonebs qid.  -Home inhaler on hold.  -Respiratory culture obtained 12/22/23, grew 1+ Candida albicans.  -[facility] pulmonary following, discussed with Dr. Connor on 12/29.  Has received empiric intermittent IV diuretics for potential volume overload with inconsistent results.  -Hypoxia improving, patient  down to 1 L oxygen via nasal cannula however continues to be severely short of breath with minimal activity  -As per pulmonary recommendations started on MetaNebs yesterday, RT following  -Prior hospitalist discussed with daughter on the phone and with the patient at the bedside.  He is still opposed to going to transitional care.  He has had a prolonged hospitalization and respiratory status is still not stable.  He is going to be superhigh risk for readmission with his tenuous pulmonary status.  I discussed palliative care evaluation which he is thinking about however he would like to discuss with pulmonary tomorrow to see if there are any other options for improvement.  -Clinical exam concerning for volume overload again, he is very crackly.  Was give Lasix 40 mg IV x 1  Plan:  -Repeat chest x-ray  1/1/24 reveals nearly resolved right upper lobe opacity, and improved bibasilar opacities consistent with improving infection/inflammation. Cardiomediastinal silhouette is unremarkable.    -palliative was consulted to assist with goals of care -> pursing TCU given improvement  -Repeat chest x-ray on 1/5/24 -follow-up results -> New platelike opacity within the right upper lung (similar to 12/23/2023) could reflect infectious/inflammatory process versus platelike atelectasis. Similar blunting of the right costophrenic angle, likely scarring/pleural thickening. No left pleural effusion. No convincing pneumothorax. Unchanged cardiomediastinal silhouette.  -SW consulted for TCU placement.        Hypernatremia -resolved  -Sodium now within normal limits.     Hypertension   Hyperlipidemia  Coronary artery disease  -Continue PTA metoprolol, amlodipine, aspirin, rosuvastatin     Chronic low back pain   *Patient is supposed to be getting a lumbar MRI soon.  Can be done as an outpatient.  -Continue PTA oxycodone.  -Gabapentin changed from 600 mg bid to 400 mg tid, continue.  -Hold naproxen for now in the setting of high-dose  steroids since he would be at high risk for upper GI bleed and ulcers.  -Oral PPI for prophylaxis.  -Continue PRN Flexeril.  -Scheduled Tylenol tid.  -Lidocaine patches.     Constipation, resolved  *Given concern for abdominal distention, obtained abdominal x-ray on 12/18 that still showed large amount of stool throughout the colon.  Started having bowel movement on 12/19/23 and has been having at least daily bowel movements since then.  -Continue on aggressive bowel regimen.     Acute urinary retention-resolved  *Required straight cath x3 12/20-12/21 and martinez placed.  -Started flomax 12/21/23.  -successful trial of voiding on 12/23/23.   -Continue to monitor for retention following catheter removal.  -Aggressively treat constipation as above.     Hx of papillary thyroid cancer s/p thyroidectomy   Hypothyroidism   -Continue PTA levothyroxine.     Insomnia   -Continue PTA Trazodone.     Leukocytosis  -Related to steroids and pneumonia.  -WBC back to normal.     Thrombocytopenia, resolved  Platelets in low 100s.  Not on any heparin products on admission through 12/20.  He is on baby aspirin twice daily, no signs of bleeding.  -Platelets 134 on 12/16/23, slowly trended down but now resolved  -Lovenox prophylaxis started 12/21/23 as platelets have trended up and are back to normal.     Bilateral pulmonary nodules  CT chest 12/11 showing scattered small bilateral pulmonary nodules measuring up to 5 mm are unchanged.  -Continued surveillance recommended.          Diet: Regular Diet Adult    DVT Prophylaxis: Enoxaparin (Lovenox) SQ  Martinez Catheter: Not present  Lines: None     Cardiac Monitoring: None  Code Status: No CPR- Do NOT Intubate      Clinically Significant Risk Factors              # Hypoalbuminemia: Lowest albumin = 3.4 g/dL at 12/8/2023 11:58 AM, will monitor as appropriate     # Hypertension: Noted on problem list                   Disposition Plan at this time, patient is stable for discharge however he is  insisting on discharging home despite TCU being recommended.  Discharging home will not be a safe option given his extensive COPD hypoxia and deconditioning.  Palliative has been consulted to assist with goals of care and to assist with discharge plans at this time.     Expected Discharge Date: 01/08/2024, 12:00 PM      Discharge Comments: pt is on iv solumedrol            Golden Logan MD  Hospitalist Service  [facility]  Securely message with [employer] (more info)  Text page via "[employer] Paging/Directory   ______________________________________________________________________    Interval History     Patient notes he feels overall unchanged today  Breathing is slightly improved but not optimal  O2 needs stable  No CP/SOB  No fevers  Cough unchanged  No new nausea / vomiting  No new complaints, wishes to pursue TCU      Physical Exam   Vital Signs: Temp: 98.6  F (37  C) Temp src: Oral BP: (!) 161/91 Pulse: 69   Resp: 18 SpO2: 94 % O2 Device: Nasal cannula Oxygen Delivery: 1 LPM  Weight: 180 lbs 15.96 oz    General Appearance: Well appearing for stated age.  Respiratory: CTAB, no rales or ronchi  Cardiovascular: S1, S2 normal, no murmurs  GI: non-tender on palpation, BS present  NEURO: Grant  PSYCH: normal mood and affect      Medical Decision Making       52 MINUTES SPENT BY ME on the date of service doing chart review, history, exam, documentation & further activities per the note.      Data     I have personally reviewed the following data over the past 24 hrs:    6.1  \   11.5 (L)   / 175     N/A N/A N/A /  123 (H)   3.6 N/A 0.78 \       Imaging results reviewed over the past 24 hrs:   No results found for this or any previous visit (from the past 24 hour(s)).

## 2024-01-06 NOTE — PLAN OF CARE
Goal Outcome Evaluation:  Summary: acute on chronic hypoxic respiratory failure d/t COPD exacerbation     DATE & TIME: 01/05/24 [id number]  Cognitive Concerns/ Orientation: Alert/Oriented x 4, forgetful @ times  BEHAVIOR & AGGRESSION TOOL COLOR: Green             ABNL VS/O2: VSS, on RA, using 1 LPM as needed via nasal cannula when up and moving; increased O2 needs with exertion; Baseline PRN oxygen use; Coarse crackles, inspiratory/expiratory wheezing; Scheduled nebs an chest physiotherapy  MOBILITY: Independent with walker/O2, up w/ PT today, ambulated in [place] x2. Did well, was able to ambulate w/out oxygen on at times per PT report.  PAIN MANAGMENT: Chronic low back pain - on scheduled tylenol/PRN prn oxycodone given x1 /flexeril available   DIET: Regular, good appetite  BOWEL/BLADDER: Continent- using urinal; Bladder scan 250   ABNL LAB/BG: , 114, 179, K 4.2 recheck in am.  DRAIN/DEVICES: R PIV dislodged from arm, MD aware & okay w/ not replacing at this time.  SKIN: Scattered bruising/scabs; Bilateral lower extremities jose j; Abrasion/scab to R knee   TESTS/PROCEDURES: Chest X-ray  D/C DATE: Will likely discharge to TCU (pt prefers [facility] as he had bad experiences with another facility in the past.. [facility] not taking any patients over the weekend.  OTHER IMPORTANT INFO: Seen by palliative-pt will speak with daughter

## 2024-01-07 LAB
ANION GAP SERPL CALCULATED.3IONS-SCNC: 8 MMOL/L (ref 7–15)
BUN SERPL-MCNC: 10.9 MG/DL (ref 8–23)
CALCIUM SERPL-MCNC: 8.7 MG/DL (ref 8.8–10.2)
CHLORIDE SERPL-SCNC: 105 MMOL/L (ref 98–107)
CREAT SERPL-MCNC: 0.79 MG/DL (ref 0.67–1.17)
DEPRECATED HCO3 PLAS-SCNC: 33 MMOL/L (ref 22–29)
EGFRCR SERPLBLD CKD-EPI 2021: >90 ML/MIN/1.73M2
GLUCOSE BLDC GLUCOMTR-MCNC: 108 MG/DL (ref 70–99)
GLUCOSE BLDC GLUCOMTR-MCNC: 120 MG/DL (ref 70–99)
GLUCOSE BLDC GLUCOMTR-MCNC: 128 MG/DL (ref 70–99)
GLUCOSE BLDC GLUCOMTR-MCNC: 92 MG/DL (ref 70–99)
GLUCOSE BLDC GLUCOMTR-MCNC: 98 MG/DL (ref 70–99)
GLUCOSE SERPL-MCNC: 80 MG/DL (ref 70–99)
PLATELET # BLD AUTO: 170 10E3/UL (ref 150–450)
POTASSIUM SERPL-SCNC: 3.6 MMOL/L (ref 3.4–5.3)
POTASSIUM SERPL-SCNC: 3.6 MMOL/L (ref 3.4–5.3)
SODIUM SERPL-SCNC: 146 MMOL/L (ref 135–145)

## 2024-01-07 PROCEDURE — 85049 AUTOMATED PLATELET COUNT: CPT | Performed by: STUDENT IN AN ORGANIZED HEALTH CARE EDUCATION/TRAINING PROGRAM

## 2024-01-07 PROCEDURE — 250N000009 HC RX 250: Performed by: STUDENT IN AN ORGANIZED HEALTH CARE EDUCATION/TRAINING PROGRAM

## 2024-01-07 PROCEDURE — 250N000013 HC RX MED GY IP 250 OP 250 PS 637: Performed by: HOSPITALIST

## 2024-01-07 PROCEDURE — 99232 SBSQ HOSP IP/OBS MODERATE 35: CPT | Performed by: STUDENT IN AN ORGANIZED HEALTH CARE EDUCATION/TRAINING PROGRAM

## 2024-01-07 PROCEDURE — 999N000157 HC STATISTIC RCP TIME EA 10 MIN

## 2024-01-07 PROCEDURE — 250N000012 HC RX MED GY IP 250 OP 636 PS 637: Performed by: STUDENT IN AN ORGANIZED HEALTH CARE EDUCATION/TRAINING PROGRAM

## 2024-01-07 PROCEDURE — 94799 UNLISTED PULMONARY SVC/PX: CPT

## 2024-01-07 PROCEDURE — 250N000009 HC RX 250: Performed by: HOSPITALIST

## 2024-01-07 PROCEDURE — 250N000011 HC RX IP 250 OP 636: Performed by: HOSPITALIST

## 2024-01-07 PROCEDURE — 120N000001 HC R&B MED SURG/OB

## 2024-01-07 PROCEDURE — 94640 AIRWAY INHALATION TREATMENT: CPT | Mod: 76

## 2024-01-07 PROCEDURE — 250N000013 HC RX MED GY IP 250 OP 250 PS 637: Performed by: INTERNAL MEDICINE

## 2024-01-07 PROCEDURE — 84132 ASSAY OF SERUM POTASSIUM: CPT | Performed by: STUDENT IN AN ORGANIZED HEALTH CARE EDUCATION/TRAINING PROGRAM

## 2024-01-07 PROCEDURE — 94640 AIRWAY INHALATION TREATMENT: CPT

## 2024-01-07 PROCEDURE — 36415 COLL VENOUS BLD VENIPUNCTURE: CPT | Performed by: STUDENT IN AN ORGANIZED HEALTH CARE EDUCATION/TRAINING PROGRAM

## 2024-01-07 PROCEDURE — 80048 BASIC METABOLIC PNL TOTAL CA: CPT | Performed by: STUDENT IN AN ORGANIZED HEALTH CARE EDUCATION/TRAINING PROGRAM

## 2024-01-07 PROCEDURE — 94660 CPAP INITIATION&MGMT: CPT

## 2024-01-07 PROCEDURE — 250N000013 HC RX MED GY IP 250 OP 250 PS 637: Performed by: STUDENT IN AN ORGANIZED HEALTH CARE EDUCATION/TRAINING PROGRAM

## 2024-01-07 RX ADMIN — LEVOTHYROXINE SODIUM 125 MCG: 125 TABLET ORAL at 09:00

## 2024-01-07 RX ADMIN — AZITHROMYCIN DIHYDRATE 500 MG: 250 TABLET ORAL at 09:00

## 2024-01-07 RX ADMIN — CYCLOBENZAPRINE 10 MG: 10 TABLET, FILM COATED ORAL at 09:04

## 2024-01-07 RX ADMIN — ACETYLCYSTEINE 2 ML: 200 SOLUTION ORAL; RESPIRATORY (INHALATION) at 03:36

## 2024-01-07 RX ADMIN — ACETAMINOPHEN 1000 MG: 500 TABLET, FILM COATED ORAL at 14:57

## 2024-01-07 RX ADMIN — IPRATROPIUM BROMIDE AND ALBUTEROL SULFATE 3 ML: .5; 3 SOLUTION RESPIRATORY (INHALATION) at 03:37

## 2024-01-07 RX ADMIN — ASPIRIN 81 MG: 81 TABLET, COATED ORAL at 21:47

## 2024-01-07 RX ADMIN — IPRATROPIUM BROMIDE AND ALBUTEROL SULFATE 3 ML: .5; 3 SOLUTION RESPIRATORY (INHALATION) at 19:48

## 2024-01-07 RX ADMIN — DULOXETINE HYDROCHLORIDE 20 MG: 20 CAPSULE, DELAYED RELEASE ORAL at 21:47

## 2024-01-07 RX ADMIN — GABAPENTIN 400 MG: 100 CAPSULE ORAL at 15:59

## 2024-01-07 RX ADMIN — GABAPENTIN 400 MG: 100 CAPSULE ORAL at 21:47

## 2024-01-07 RX ADMIN — PREDNISONE 20 MG: 20 TABLET ORAL at 09:02

## 2024-01-07 RX ADMIN — ACETYLCYSTEINE 2 ML: 200 SOLUTION ORAL; RESPIRATORY (INHALATION) at 15:12

## 2024-01-07 RX ADMIN — ACETAMINOPHEN 1000 MG: 500 TABLET, FILM COATED ORAL at 21:46

## 2024-01-07 RX ADMIN — TRAZODONE HYDROCHLORIDE 50 MG: 50 TABLET ORAL at 22:44

## 2024-01-07 RX ADMIN — ACETYLCYSTEINE 2 ML: 200 SOLUTION ORAL; RESPIRATORY (INHALATION) at 19:48

## 2024-01-07 RX ADMIN — TAMSULOSIN HYDROCHLORIDE 0.4 MG: 0.4 CAPSULE ORAL at 09:01

## 2024-01-07 RX ADMIN — ACETYLCYSTEINE 2 ML: 200 SOLUTION ORAL; RESPIRATORY (INHALATION) at 07:10

## 2024-01-07 RX ADMIN — ROSUVASTATIN CALCIUM 10 MG: 10 TABLET, FILM COATED ORAL at 21:47

## 2024-01-07 RX ADMIN — CYCLOBENZAPRINE 10 MG: 10 TABLET, FILM COATED ORAL at 22:44

## 2024-01-07 RX ADMIN — AMLODIPINE BESYLATE 5 MG: 5 TABLET ORAL at 21:47

## 2024-01-07 RX ADMIN — ENOXAPARIN SODIUM 40 MG: 40 INJECTION SUBCUTANEOUS at 12:52

## 2024-01-07 RX ADMIN — ASPIRIN 81 MG: 81 TABLET, COATED ORAL at 09:01

## 2024-01-07 RX ADMIN — METOPROLOL SUCCINATE 50 MG: 50 TABLET, EXTENDED RELEASE ORAL at 21:47

## 2024-01-07 RX ADMIN — ACETAMINOPHEN 1000 MG: 500 TABLET, FILM COATED ORAL at 06:47

## 2024-01-07 RX ADMIN — OXYCODONE HYDROCHLORIDE 5 MG: 5 TABLET ORAL at 09:04

## 2024-01-07 RX ADMIN — PANTOPRAZOLE SODIUM 40 MG: 40 TABLET, DELAYED RELEASE ORAL at 06:48

## 2024-01-07 RX ADMIN — OXYCODONE HYDROCHLORIDE 5 MG: 5 TABLET ORAL at 22:44

## 2024-01-07 RX ADMIN — DULOXETINE HYDROCHLORIDE 20 MG: 20 CAPSULE, DELAYED RELEASE ORAL at 09:00

## 2024-01-07 RX ADMIN — GABAPENTIN 400 MG: 100 CAPSULE ORAL at 09:01

## 2024-01-07 RX ADMIN — IPRATROPIUM BROMIDE AND ALBUTEROL SULFATE 3 ML: .5; 3 SOLUTION RESPIRATORY (INHALATION) at 15:12

## 2024-01-07 RX ADMIN — IPRATROPIUM BROMIDE AND ALBUTEROL SULFATE 3 ML: .5; 3 SOLUTION RESPIRATORY (INHALATION) at 07:10

## 2024-01-07 ASSESSMENT — ACTIVITIES OF DAILY LIVING (ADL)
ADLS_ACUITY_SCORE: 35

## 2024-01-07 NOTE — PLAN OF CARE
Goal Outcome Evaluation:  Summary: acute on chronic hypoxic respiratory failure d/t COPD exacerbation     DATE & TIME: 1/07/24 [id number]  Cognitive Concerns/ Orientation: A&O x 4, forgetful   BEHAVIOR & AGGRESSION TOOL COLOR: Green             ABNL VS/O2: VSS, on RA, using 1-2 LPM as needed via nasal cannula when up and moving; increased O2 needs with exertion; Baseline PRN oxygen use; LS diminished with  crackles, scheduled nebs and chest physiotherapy  MOBILITY: Independent with walker, ambulated in [place] x3  PAIN MANAGEMENT: Chronic low back pain - on scheduled Tylenol; PRN Oxycodone and PRN Flexerill given x1   DIET: Regular, good appetite  BOWEL/BLADDER: Continent- using urinal; voided adequately; no BM today  ABNL LAB/BG: BG 98, 128, 120  DRAIN/DEVICES: No MD FER aware   SKIN: Scattered bruising/scabs; BLE jose j; abrasion/scab to R knee   TESTS/PROCEDURES: None  D/C DATE: Discharge to TCU vs home, SW following.  OTHER IMPORTANT INFO: Palliative following, possible palliative meeting with daughter on Monday to assist with goals of care and to assist with discharge plans.

## 2024-01-07 NOTE — PLAN OF CARE
Summary: acute on chronic hypoxic respiratory failure d/t COPD exacerbation     DATE & TIME: 1/06/24 1900-1/07/24 0730  Cognitive Concerns/ Orientation: A&O x 4, forgetful   BEHAVIOR & AGGRESSION TOOL COLOR: Green             ABNL VS/O2: VSS, on RA, using 1 LPM as needed via nasal cannula when up and moving; increased O2 needs with exertion; Baseline PRN oxygen use; LS diminished with  crackles, scheduled nebs and chest physiotherapy  MOBILITY: Independent with walker  PAIN MANAGEMENT: Chronic low back pain - on scheduled Tylenol; PRN Oxycodone and PRN Flexerill given x1   DIET: Regular, good appetite  BOWEL/BLADDER: Continent- using urinal; voided adequately; BM x1  ABNL LAB/BG: BG 92  DRAIN/DEVICES: No MD FER aware   SKIN: Scattered bruising/scabs; BLE jose j; abrasion/scab to R knee   TESTS/PROCEDURES: None  D/C DATE: Discharge to TCU pending placement, SW following.  OTHER IMPORTANT INFO: Palliative following, possible palliative meeting with daughter on Monday.

## 2024-01-07 NOTE — PROGRESS NOTES
[facility]    Medicine Progress Note - Hospitalist Service    Date of Admission:  12/6/2023  Date of Service: 01/07/2024    Assessment & Plan     Harrison Thomas is a 78 year old male with medical history significant for chronic hypoxic respiratory failure [has oxygen as needed at home], COPD, RUL lung mass s/p radiation, untreated BRENNEN, HTN, HLD, CAD, hypothyroidism admitted on 12/6/2023 with acute on chronic hypoxic respiratory failure likely 2/2 COPD exacerbation.      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  BRENNEN   Pulmonary edema  *Follows with Dr. Handy with pulmonology at [facility], last seen in September 2023.  On low dose chronic prednisone (2mg).  *Patient presented to the ED with increased shortness of breath over the past week, increased oxygen use at home.  *Admission clinical picture consistent with COPD exacerbation - poor air movement with diffuse expiratory wheezing - placed on steroids and nebs.  *BNP and procalcitonin wnl, COVID/flu/RSV negative on admission, resp viral panel negative (12/15).  *CT pulm angio 12/11 negative for PE, shows mild bronchial wall thickening and extensive emphysema.  *Underwent sniff test this admission that was negative.  *Received 5-day treatment course azithromycin at admission, resumed  mg every other day dosing for prophylaxis.  *Intermittently has required high flow nasal cannula and BiPAP  *[facility] pulmonary following.  *Transitioned from IV solumedrol to prednisone 12/20.  -Continue pantoprazole until tapered back to PTA dose of prednisone.  -Duonebs qid.  -Home inhaler on hold.  -Respiratory culture obtained 12/22/23, grew 1+ Candida albicans.  -[facility] pulmonary following, discussed with Dr. Connor on 12/29.  Has received empiric intermittent IV diuretics for potential volume overload with inconsistent  results.  -Hypoxia improving, patient down to 1 L oxygen via nasal cannula however continues to be severely short of breath with minimal activity  -As per pulmonary recommendations started on MetaNebs yesterday, RT following  -Prior hospitalist discussed with daughter on the phone and with the patient at the bedside.  He is still opposed to going to transitional care.  He has had a prolonged hospitalization and respiratory status is still not stable.  He is going to be superhigh risk for readmission with his tenuous pulmonary status.  I discussed palliative care evaluation which he is thinking about however he would like to discuss with pulmonary tomorrow to see if there are any other options for improvement.  -Clinical exam concerning for volume overload again, he is very crackly.  Was give Lasix 40 mg IV x 1  Plan:  -Repeat chest x-ray  1/1/24 reveals nearly resolved right upper lobe opacity, and improved bibasilar opacities consistent with improving infection/inflammation. Cardiomediastinal silhouette is unremarkable.    -palliative was consulted to assist with goals of care -> pursing TCU given improvement  -Repeat chest x-ray on 1/5/24 -follow-up results -> New platelike opacity within the right upper lung (similar to 12/23/2023) could reflect infectious/inflammatory process versus platelike atelectasis. Similar blunting of the right costophrenic angle, likely scarring/pleural thickening. No left pleural effusion. No convincing pneumothorax. Unchanged cardiomediastinal silhouette.  -SW consulted for TCU placement.     Hypernatremia -resolved  -Sodium now within normal limits.     Hypertension   Hyperlipidemia  Coronary artery disease  -Continue PTA metoprolol, amlodipine, aspirin, rosuvastatin     Chronic low back pain   *Patient is supposed to be getting a lumbar MRI soon.  Can be done as an outpatient.  -Continue PTA oxycodone.  -Gabapentin changed from 600 mg bid to 400 mg tid, continue.  -Hold naproxen for  now in the setting of high-dose steroids since he would be at high risk for upper GI bleed and ulcers.  -Oral PPI for prophylaxis.  -Continue PRN Flexeril.  -Scheduled Tylenol tid.  -Lidocaine patches.     Constipation, resolved  *Given concern for abdominal distention, obtained abdominal x-ray on 12/18 that still showed large amount of stool throughout the colon.  Started having bowel movement on 12/19/23 and has been having at least daily bowel movements since then.  -Continue on aggressive bowel regimen.     Acute urinary retention-resolved  *Required straight cath x3 12/20-12/21 and martinez placed.  -Started flomax 12/21/23.  -successful trial of voiding on 12/23/23.   -Continue to monitor for retention following catheter removal.  -Aggressively treat constipation as above.     Hx of papillary thyroid cancer s/p thyroidectomy   Hypothyroidism   -Continue PTA levothyroxine.     Insomnia   -Continue PTA Trazodone.     Leukocytosis  -Related to steroids and pneumonia.  -WBC back to normal.     Thrombocytopenia, resolved  Platelets in low 100s.  Not on any heparin products on admission through 12/20.  He is on baby aspirin twice daily, no signs of bleeding.  -Platelets 134 on 12/16/23, slowly trended down but now resolved  -Lovenox prophylaxis started 12/21/23 as platelets have trended up and are back to normal.     Bilateral pulmonary nodules  CT chest 12/11 showing scattered small bilateral pulmonary nodules measuring up to 5 mm are unchanged.  -Continued surveillance recommended.          Diet: Regular Diet Adult    DVT Prophylaxis: Enoxaparin (Lovenox) SQ  Martinez Catheter: Not present  Lines: None     Cardiac Monitoring: None  Code Status: No CPR- Do NOT Intubate      Clinically Significant Risk Factors         # Hypernatremia: Highest Na = 146 mmol/L in last 2 days, will monitor as appropriate      # Hypoalbuminemia: Lowest albumin = 3.4 g/dL at 12/8/2023 11:58 AM, will monitor as appropriate     # Hypertension:  Noted on problem list                   Disposition Plan at this time, patient is stable for discharge however he is insisting on discharging home despite TCU being recommended.  Discharging home will not be a safe option given his extensive COPD hypoxia and deconditioning.  Palliative has been consulted to assist with goals of care and to assist with discharge plans at this time.    Expected Discharge Date: 01/08/2024, 12:00 PM      Discharge Comments: pt is on iv solumedrol            Golden Logan MD  Hospitalist Service  [facility]  Securely message with [email or web address] (more info)  Text page via [employer] Paging/Directory   ______________________________________________________________________    Interval History     No acute events overnight  Patient notes he feels improved  Breathing improving  O2 needs stable, almost on RA  No CP/SOB  No fevers  Cough better  No new nausea / vomiting  No new complaints, wishes to pursue TCU      Physical Exam   Vital Signs: Temp: 98.1  F (36.7  C) Temp src: Oral BP: (!) 150/90 Pulse: 84   Resp: 20 SpO2: 93 % O2 Device: Nasal cannula Oxygen Delivery: 2 LPM  Weight: 180 lbs 15.96 oz    General Appearance: Well appearing for stated age.  Respiratory: CTAB, no rales or ronchi  Cardiovascular: S1, S2 normal, no murmurs  GI: non-tender on palpation, BS present  NEURO: Grant  PSYCH: normal mood and affect      Medical Decision Making       ------------------ MEDICAL DECISION MAKING ------------------------------------------------------------------------------------------------------  Moderate Complexity Decision Making       Data     I have personally reviewed the following data over the past 24 hrs:    N/A  \   N/A   / 170     146 (H) 105 10.9 /  128 (H)   3.6; 3.6 33 (H) 0.79 \       Imaging results reviewed over the past 24 hrs:   No results found for this or any previous visit (from the past 24 hour(s)).

## 2024-01-07 NOTE — PLAN OF CARE
Goal Outcome Evaluation:  Summary: acute on chronic hypoxic respiratory failure d/t COPD exacerbation     DATE & TIME: 1/06/24 0700 1930  Cognitive Concerns/ Orientation: A&O x 4, forgetful   BEHAVIOR & AGGRESSION TOOL COLOR: Green             ABNL VS/O2: VSS, on RA, using 1 LPM as needed via nasal cannula when up and moving; increased O2 needs with exertion; Baseline PRN oxygen use; LS with coarse crackles, inspiratory/expiratory wheezing; Scheduled nebs and chest physiotherapy  MOBILITY: Independent with walker/O2. Ambulated in [place] today   PAIN MANAGMENT: Chronic low back pain - on scheduled Tylenol/PRN Oxycodone and PRN Flexerill available    DIET: Regular, good appetite  BOWEL/BLADDER: Continent- using urinal; voided adequately; BM x1  ABNL LAB/BG: BG 51, 139, 196, 123, 163, K 3.6 recheck tomorrow am.  DRAIN/DEVICES: No MD FER aware   SKIN: Scattered bruising/scabs; BLE jose j; abrasion/scab to R knee   TESTS/PROCEDURES: None  D/C DATE: Will likely discharge to TCU ([facility] prefers [facility]; [facility] not taking any patients over the weekend).  OTHER IMPORTANT INFO: Palliative following, pt wants to talk to daughter some more.

## 2024-01-08 ENCOUNTER — APPOINTMENT (OUTPATIENT)
Dept: PHYSICAL THERAPY | Facility: CLINIC | Age: 79
DRG: 190 | End: 2024-01-08
Payer: MEDICARE

## 2024-01-08 LAB
GLUCOSE BLDC GLUCOMTR-MCNC: 102 MG/DL (ref 70–99)
GLUCOSE BLDC GLUCOMTR-MCNC: 116 MG/DL (ref 70–99)
GLUCOSE BLDC GLUCOMTR-MCNC: 121 MG/DL (ref 70–99)
GLUCOSE BLDC GLUCOMTR-MCNC: 136 MG/DL (ref 70–99)
GLUCOSE BLDC GLUCOMTR-MCNC: 87 MG/DL (ref 70–99)

## 2024-01-08 PROCEDURE — 250N000013 HC RX MED GY IP 250 OP 250 PS 637: Performed by: HOSPITALIST

## 2024-01-08 PROCEDURE — 94640 AIRWAY INHALATION TREATMENT: CPT

## 2024-01-08 PROCEDURE — 250N000009 HC RX 250: Performed by: STUDENT IN AN ORGANIZED HEALTH CARE EDUCATION/TRAINING PROGRAM

## 2024-01-08 PROCEDURE — 250N000012 HC RX MED GY IP 250 OP 636 PS 637: Performed by: STUDENT IN AN ORGANIZED HEALTH CARE EDUCATION/TRAINING PROGRAM

## 2024-01-08 PROCEDURE — 250N000013 HC RX MED GY IP 250 OP 250 PS 637: Performed by: STUDENT IN AN ORGANIZED HEALTH CARE EDUCATION/TRAINING PROGRAM

## 2024-01-08 PROCEDURE — 94660 CPAP INITIATION&MGMT: CPT

## 2024-01-08 PROCEDURE — 97116 GAIT TRAINING THERAPY: CPT | Mod: GP | Performed by: PHYSICAL THERAPY ASSISTANT

## 2024-01-08 PROCEDURE — 250N000011 HC RX IP 250 OP 636: Performed by: HOSPITALIST

## 2024-01-08 PROCEDURE — 94799 UNLISTED PULMONARY SVC/PX: CPT

## 2024-01-08 PROCEDURE — 250N000009 HC RX 250: Performed by: HOSPITALIST

## 2024-01-08 PROCEDURE — 99232 SBSQ HOSP IP/OBS MODERATE 35: CPT | Performed by: STUDENT IN AN ORGANIZED HEALTH CARE EDUCATION/TRAINING PROGRAM

## 2024-01-08 PROCEDURE — 250N000013 HC RX MED GY IP 250 OP 250 PS 637: Performed by: INTERNAL MEDICINE

## 2024-01-08 PROCEDURE — 99233 SBSQ HOSP IP/OBS HIGH 50: CPT | Performed by: NURSE PRACTITIONER

## 2024-01-08 PROCEDURE — 999N000157 HC STATISTIC RCP TIME EA 10 MIN

## 2024-01-08 PROCEDURE — 94640 AIRWAY INHALATION TREATMENT: CPT | Mod: 76

## 2024-01-08 PROCEDURE — 120N000001 HC R&B MED SURG/OB

## 2024-01-08 RX ADMIN — IPRATROPIUM BROMIDE AND ALBUTEROL SULFATE 3 ML: .5; 3 SOLUTION RESPIRATORY (INHALATION) at 00:23

## 2024-01-08 RX ADMIN — ACETYLCYSTEINE 2 ML: 200 SOLUTION ORAL; RESPIRATORY (INHALATION) at 11:46

## 2024-01-08 RX ADMIN — ACETYLCYSTEINE 2 ML: 200 SOLUTION ORAL; RESPIRATORY (INHALATION) at 15:37

## 2024-01-08 RX ADMIN — IPRATROPIUM BROMIDE AND ALBUTEROL SULFATE 3 ML: .5; 3 SOLUTION RESPIRATORY (INHALATION) at 23:30

## 2024-01-08 RX ADMIN — DOCUSATE SODIUM 50 MG AND SENNOSIDES 8.6 MG 2 TABLET: 8.6; 5 TABLET, FILM COATED ORAL at 09:02

## 2024-01-08 RX ADMIN — IPRATROPIUM BROMIDE AND ALBUTEROL SULFATE 3 ML: .5; 3 SOLUTION RESPIRATORY (INHALATION) at 07:28

## 2024-01-08 RX ADMIN — PANTOPRAZOLE SODIUM 40 MG: 40 TABLET, DELAYED RELEASE ORAL at 06:30

## 2024-01-08 RX ADMIN — CYCLOBENZAPRINE 10 MG: 10 TABLET, FILM COATED ORAL at 22:58

## 2024-01-08 RX ADMIN — OXYCODONE HYDROCHLORIDE 5 MG: 5 TABLET ORAL at 22:58

## 2024-01-08 RX ADMIN — METOPROLOL SUCCINATE 50 MG: 50 TABLET, EXTENDED RELEASE ORAL at 20:45

## 2024-01-08 RX ADMIN — ASPIRIN 81 MG: 81 TABLET, COATED ORAL at 09:03

## 2024-01-08 RX ADMIN — TRAZODONE HYDROCHLORIDE 50 MG: 50 TABLET ORAL at 22:54

## 2024-01-08 RX ADMIN — IPRATROPIUM BROMIDE AND ALBUTEROL SULFATE 3 ML: .5; 3 SOLUTION RESPIRATORY (INHALATION) at 03:51

## 2024-01-08 RX ADMIN — PREDNISONE 20 MG: 20 TABLET ORAL at 09:02

## 2024-01-08 RX ADMIN — ROSUVASTATIN CALCIUM 10 MG: 10 TABLET, FILM COATED ORAL at 20:45

## 2024-01-08 RX ADMIN — GABAPENTIN 400 MG: 100 CAPSULE ORAL at 22:54

## 2024-01-08 RX ADMIN — ACETYLCYSTEINE 2 ML: 200 SOLUTION ORAL; RESPIRATORY (INHALATION) at 07:28

## 2024-01-08 RX ADMIN — IPRATROPIUM BROMIDE AND ALBUTEROL SULFATE 3 ML: .5; 3 SOLUTION RESPIRATORY (INHALATION) at 11:45

## 2024-01-08 RX ADMIN — ACETYLCYSTEINE 2 ML: 200 SOLUTION ORAL; RESPIRATORY (INHALATION) at 03:51

## 2024-01-08 RX ADMIN — ACETAMINOPHEN 1000 MG: 500 TABLET, FILM COATED ORAL at 13:11

## 2024-01-08 RX ADMIN — GABAPENTIN 400 MG: 100 CAPSULE ORAL at 15:50

## 2024-01-08 RX ADMIN — GABAPENTIN 400 MG: 100 CAPSULE ORAL at 09:03

## 2024-01-08 RX ADMIN — ENOXAPARIN SODIUM 40 MG: 40 INJECTION SUBCUTANEOUS at 12:10

## 2024-01-08 RX ADMIN — DULOXETINE HYDROCHLORIDE 20 MG: 20 CAPSULE, DELAYED RELEASE ORAL at 20:45

## 2024-01-08 RX ADMIN — ACETYLCYSTEINE 2 ML: 200 SOLUTION ORAL; RESPIRATORY (INHALATION) at 23:30

## 2024-01-08 RX ADMIN — IPRATROPIUM BROMIDE AND ALBUTEROL SULFATE 3 ML: .5; 3 SOLUTION RESPIRATORY (INHALATION) at 19:44

## 2024-01-08 RX ADMIN — ACETYLCYSTEINE 2 ML: 200 SOLUTION ORAL; RESPIRATORY (INHALATION) at 19:44

## 2024-01-08 RX ADMIN — TAMSULOSIN HYDROCHLORIDE 0.4 MG: 0.4 CAPSULE ORAL at 09:02

## 2024-01-08 RX ADMIN — IPRATROPIUM BROMIDE AND ALBUTEROL SULFATE 3 ML: .5; 3 SOLUTION RESPIRATORY (INHALATION) at 15:37

## 2024-01-08 RX ADMIN — ACETYLCYSTEINE 2 ML: 200 SOLUTION ORAL; RESPIRATORY (INHALATION) at 00:23

## 2024-01-08 RX ADMIN — LEVOTHYROXINE SODIUM 125 MCG: 125 TABLET ORAL at 09:02

## 2024-01-08 RX ADMIN — ASPIRIN 81 MG: 81 TABLET, COATED ORAL at 20:45

## 2024-01-08 RX ADMIN — DULOXETINE HYDROCHLORIDE 20 MG: 20 CAPSULE, DELAYED RELEASE ORAL at 09:02

## 2024-01-08 RX ADMIN — AMLODIPINE BESYLATE 5 MG: 5 TABLET ORAL at 22:54

## 2024-01-08 RX ADMIN — ACETAMINOPHEN 1000 MG: 500 TABLET, FILM COATED ORAL at 22:54

## 2024-01-08 RX ADMIN — LIDOCAINE 2 PATCH: 4 PATCH TOPICAL at 09:03

## 2024-01-08 RX ADMIN — ACETAMINOPHEN 1000 MG: 500 TABLET, FILM COATED ORAL at 06:30

## 2024-01-08 ASSESSMENT — ACTIVITIES OF DAILY LIVING (ADL)
ADLS_ACUITY_SCORE: 35
ADLS_ACUITY_SCORE: 39
ADLS_ACUITY_SCORE: 35
ADLS_ACUITY_SCORE: 39

## 2024-01-08 NOTE — PROGRESS NOTES
[facility]    Medicine Progress Note - Hospitalist Service    Date of Admission:  12/6/2023  Date of Service: 01/08/2024    Assessment & Plan     Harrison Thomas is a 78 year old male with medical history significant for chronic hypoxic respiratory failure [has oxygen as needed at home], COPD, RUL lung mass s/p radiation, untreated BRENNEN, HTN, HLD, CAD, hypothyroidism admitted on 12/6/2023 with acute on chronic hypoxic respiratory failure likely 2/2 COPD exacerbation.      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  BRENNEN   Pulmonary edema  *Follows with Dr. Handy with pulmonology at [facility], last seen in September 2023.  On low dose chronic prednisone (2mg).  *Patient presented to the ED with increased shortness of breath over the past week, increased oxygen use at home.  *Admission clinical picture consistent with COPD exacerbation - poor air movement with diffuse expiratory wheezing - placed on steroids and nebs.  *BNP and procalcitonin wnl, COVID/flu/RSV negative on admission, resp viral panel negative (12/15).  *CT pulm angio 12/11 negative for PE, shows mild bronchial wall thickening and extensive emphysema.  *Underwent sniff test this admission that was negative.  *Received 5-day treatment course azithromycin at admission, resumed  mg every other day dosing for prophylaxis.  *Intermittently has required high flow nasal cannula and BiPAP  *[facility] pulmonary following.  *Transitioned from IV solumedrol to prednisone 12/20.  -Continue pantoprazole until tapered back to PTA dose of prednisone.  -Duonebs qid.  -Home inhaler on hold.  -Respiratory culture obtained 12/22/23, grew 1+ Candida albicans.  -[facility] pulmonary following, discussed with Dr. Connor on 12/29.  Has received empiric intermittent IV diuretics for potential volume overload with inconsistent  results.  -Hypoxia improving, patient down to 1 L oxygen via nasal cannula however continues to be severely short of breath with minimal activity  -As per pulmonary recommendations started on MetaNebs yesterday, RT following  -Prior hospitalist discussed with daughter on the phone and with the patient at the bedside.  He is still opposed to going to transitional care.  He has had a prolonged hospitalization and respiratory status is still not stable.  He is going to be superhigh risk for readmission with his tenuous pulmonary status.  I discussed palliative care evaluation which he is thinking about however he would like to discuss with pulmonary tomorrow to see if there are any other options for improvement.  -Clinical exam concerning for volume overload again, he is very crackly.  Was give Lasix 40 mg IV x 1.  Plan:  -Repeat chest x-ray 1/1/24 reveals nearly resolved right upper lobe opacity, and improved bibasilar opacities consistent with improving infection/inflammation. Cardiomediastinal silhouette is unremarkable.    -Palliative was consulted to assist with goals of care -> pursing TCU given improvement  -Repeat chest x-ray on 1/5/24 -follow-up results -> New platelike opacity within the right upper lung (similar to 12/23/2023) could reflect infectious/inflammatory process versus platelike atelectasis. Similar blunting of the right costophrenic angle, likely scarring/pleural thickening. No left pleural effusion. No convincing pneumothorax. Unchanged cardiomediastinal silhouette.  -SW consulted for TCU placement.  -Now on RA  -Continue Duonebs QID and Mucomyst QID (do with DuoNebs)  -Flutter valve following nebs QID     Hypernatremia - resolved  -Sodium now within normal limits.     Hypertension   Hyperlipidemia  Coronary artery disease  -Continue PTA metoprolol, amlodipine, aspirin, rosuvastatin     Chronic low back pain   *Patient is supposed to be getting a lumbar MRI soon.  Can be done as an  outpatient.  -Continue PTA oxycodone.  -Gabapentin changed from 600 mg bid to 400 mg tid, continue.  -Hold naproxen for now in the setting of high-dose steroids since he would be at high risk for upper GI bleed and ulcers.  -Oral PPI for prophylaxis.  -Continue PRN Flexeril.  -Scheduled Tylenol tid.  -Lidocaine patches.     Constipation, resolved  *Given concern for abdominal distention, obtained abdominal x-ray on 12/18 that still showed large amount of stool throughout the colon.  Started having bowel movement on 12/19/23 and has been having at least daily bowel movements since then.  -Continue on aggressive bowel regimen.     Acute urinary retention-resolved  *Required straight cath x3 12/20-12/21 and martinez placed.  -Started flomax 12/21/23.  -successful trial of voiding on 12/23/23.   -Continue to monitor for retention following catheter removal.  -Aggressively treat constipation as above.     Hx of papillary thyroid cancer s/p thyroidectomy   Hypothyroidism   -Continue PTA levothyroxine.     Insomnia   -Continue PTA Trazodone.     Leukocytosis  -Related to steroids and pneumonia.  -WBC back to normal.     Thrombocytopenia, resolved  Platelets in low 100s.  Not on any heparin products on admission through 12/20.  He is on baby aspirin twice daily, no signs of bleeding.  -Platelets 134 on 12/16/23, slowly trended down but now resolved  -Lovenox prophylaxis started 12/21/23 as platelets have trended up and are back to normal.     Bilateral pulmonary nodules  CT chest 12/11 showing scattered small bilateral pulmonary nodules measuring up to 5 mm are unchanged.  -Continued surveillance recommended.          Diet: Regular Diet Adult    DVT Prophylaxis: Enoxaparin (Lovenox) SQ  Martinez Catheter: Not present  Lines: None     Cardiac Monitoring: None  Code Status: No CPR- Do NOT Intubate      Clinically Significant Risk Factors         # Hypernatremia: Highest Na = 146 mmol/L in last 2 days, will monitor as appropriate       # Hypoalbuminemia: Lowest albumin = 3.4 g/dL at 12/8/2023 11:58 AM, will monitor as appropriate     # Hypertension: Noted on problem list                   Disposition Plan at this time, patient is stable for discharge however he is insisting on discharging home despite TCU being recommended.  Discharging home will not be a safe option given his extensive COPD hypoxia and deconditioning.      Expected Discharge Date: 01/09/2024, 12:00 PM      Discharge Comments: pt is on iv solumedrol            Golden Logan MD  Hospitalist Service  [facility]  Securely message with [employer] (more info)  Text page via [employer] Paging/Directory   ______________________________________________________________________    Interval History     No acute events overnight  Patient notes he feels improved  O2 needs stable, almost on RA  No CP/SOB  No fevers  Cough better  No new nausea / vomiting  No new complaints, working on placement    Physical Exam   Vital Signs: Temp: 98.5  F (36.9  C) Temp src: Oral BP: 135/87 Pulse: 83   Resp: 18 SpO2: 92 % O2 Device: None (Room air) Oxygen Delivery: 2 LPM  Weight: 180 lbs 15.96 oz    General Appearance: Well appearing for stated age.  Respiratory: CTAB, no rales or ronchi  Cardiovascular: S1, S2 normal, no murmurs  GI: non-tender on palpation, BS present  NEURO: Grant  PSYCH: normal mood and affect      Medical Decision Making       ------------------ MEDICAL DECISION MAKING ------------------------------------------------------------------------------------------------------  Moderate Complexity Decision Making       Data         Imaging results reviewed over the past 24 hrs:   No results found for this or any previous visit (from the past 24 hour(s)).

## 2024-01-08 NOTE — PROGRESS NOTES
Palliative Care Daily Progress Note  [facility]     Patient Name: Harrison Thomas  Date of Admission: 12/6/2023   Today the patient was seen for: Goals of care discussion, per family request      Recommendations & Counseling       GOALS OF CARE:  Restorative with limits   Harrison subjectively feels better in the last few days and continues to desire a restorative pathway. He is progressing his activity tolerance and is now agreeable to TCU  Family friend is requesting a care conference with hospitalist and pulmonologist for an update on pt's medical condition, imaging, testing. I have updated Dr. Logan and will defer to him     ADVANCE CARE PLANNING:  Patient has an advance directive dated 3/2023.  Primary Health Care Agent wife Adeola.  Alternate(s) none.  Consider updating this, as dtr Janey appears to be most involved.   There is no POLST form on file, recommend to complete prior to DC.  Code status: No CPR- Do NOT Intubate    MEDICAL MANAGEMENT:  We are not actively managing symptoms at this time.    PSYCHOSOCIAL/SPIRITUAL:  Family - supportive dtr Janey. Grandson present today. Family friend who is an ED doctor available for the meeting too    Palliative Care will sign off. Thank you for the consult and allowing us to aid in the care of Harrison Thomas.    These recommendations have been discussed with Dr. Logan, unit care coordinator Liliana.    Chart documentation was completed, in part, with [employer] voice-recognition software. Even though reviewed, some grammatical, spelling, and word errors may remain.    PREETI Mohan CNP  Securely message with [employer] (more info)  Text page via [facility] Paging/Directory         Assessments           Harrison Thomas is a 78 year old male with a past medical history significant for chronic hypoxic respiratory failure [has oxygen as needed at home], COPD, RUL lung mass s/p radiation, untreated BRENNEN, HTN, HLD, CAD, and hypothyroidism admitted  on 12/6/2023 with acute on chronic hypoxic respiratory failure likely 2/2 COPD exacerbation and hospital acquired PNA. It has been a protracted course with subjective breathlessness/tachypnea and profound deconditioning despite optimization of his COPD.      Prognosis, Goals, & Planning:   Prognosis, Goals, and/or Advance Care Planning addressed today:  Restorative with limits   Harrison subjectively feels better in the last few days and continues to desire a restorative pathway. He is progressing his activity tolerance and is now agreeable to TCU  Did review the general factors for being eligible for a hospice plan of care, factoring in a progressive chronic illness, lengthy hospitalization, and overarching desires for plan of care.   Family friend is requesting a care conference with hospitalist and pulmonologist for an update on pt's medical condition, imaging, testing. I have updated Dr. Logan and will defer to him     Code Status was addressed today:   No already DNR/DNI    Coping, Meaning, & Spirituality:   Mood, coping, and/or meaning in the context of serious illness were addressed today: Yes         Interval History:     Chart review/discussion with unit or clinical team members:   Stable, no acute events. Breathing stable. Now agreeable to TCU discharge.     Per patient or family/caregivers today:  Breathing is feeling better. Able to progress his mobility and activity tolerance.          Review of Systems:     Besides above, an additional N/A system ROS was reviewed and is unremarkable          Physical Exam:   Temp:  [97.9  F (36.6  C)-98.2  F (36.8  C)] 97.9  F (36.6  C)  Pulse:  [72-93] 78  Resp:  [20] 20  BP: (126-157)/(72-90) 157/87  SpO2:  [90 %-96 %] 90 %  180 lbs 15.96 oz  CONSTITUTIONAL: Chronically ill man seen resting in bed in NAD, A&Ox3. Calm and cooperative. Family present   HEENT: NCAT  RESPIRATORY: Tachypneic respiratory effort on room air   NEUROLOGIC: Appropriately responsive during  interview  PSYCH: Affect engaged            Current Problem List:   Principal Problem:    COPD exacerbation (H)      Allergies   Allergen Reactions    Levaquin Difficulty breathing    Atorvastatin Calcium      Other reaction(s): Cramps   lipitor    Cats     Clopidogrel Bisulfate     Dogs     Hctz [Hydrochlorothiazide]      Rash on legs    Levofloxacin     Sulfasalazine Other (See Comments)     Stomach cramps             Medications:   APAP 1 g p.o. 3 times daily  Mucomyst nebulizer  Norvasc  Aspirin  Azithromycin  Cymbalta 20 mg p.o. twice daily  Gabapentin 400 mg p.o. 3 times daily  Insulin  DuoNebs  Synthroid  Lidoderm patches  Metoprolol  PPI  MiraLAX twice daily  Prednisone taper  Crestor  Senna 2 tablets p.o. twice daily  Flomax  Trazodone 50 mg p.o. nightly  Flexeril 10 mg p.o. every 6 hours as needed muscle spasms  Oxycodone 5 mg p.o. every 4 hours as needed pain         Data Reviewed:   Recent imaging independently reviewed, my comments on pertinents:   1/5 CXR with RUL opacity     Recent lab data independently reviewed, my comments on pertinents:   Na 146  K 3.6  Creat 0.79  WBC 6.1  Hgb 11.5  Plt 170  Albumin 3.4    Medical Decision Making   Please see A&P for additional details of medical decision making.  MANAGEMENT DISCUSSED with the following over the past 24 hours: Dr. Logan, unit care coordinator Liliana   NOTE(S)/MEDICAL RECORDS REVIEWED over the past 24 hours: hospitalist notes, nursing notes, SW note  Tests personally interpreted in the past 24 hours:  - CHEST XRAY showing RUL opacity  Tests REVIEWED in the past 24 hours:  - See lab/imaging results included in the data section of the note  - BMP  - CBC  SUPPLEMENTAL HISTORY, in addition to the patient's history, over the past 24 hours obtained from:   - Dr. Logan, unit care coordinator Liliana, dtr Janey, additional family  Medical complexity over the past 24 hours:  - high

## 2024-01-08 NOTE — PROGRESS NOTES
Care Management Follow Up    Length of Stay (days): 33    Expected Discharge Date: 01/09/2024     Concerns to be Addressed: discharge planning     Patient plan of care discussed at interdisciplinary rounds: Yes    Anticipated Discharge Disposition: Transitional Care     Anticipated Discharge Services: Rehab   Anticipated Discharge DME: Oxygen    Patient/family educated on Medicare website which has current facility and service quality ratings: yes  Education Provided on the Discharge Plan: Yes  Patient/Family in Agreement with the Plan: yes    Referrals Placed by CM/SW: Home care, now planning TCU  Private pay costs discussed: Not applicable    Additional Information:  Planning transition to a TCU.  Patient was given a list of area TCUs with the Medicare rating.  Will meet with patient again in the AM and send out more referrals.  [facility] declined and not sure if they are actually accepting patients currently.  Patient wanted a referral to [facility], but unable to find this on DOD.    Liliana Calero, RN  Inpatient Care Management  [phone]

## 2024-01-08 NOTE — PLAN OF CARE
Summary: acute on chronic hypoxic respiratory failure d/t COPD exacerbation     DATE & TIME: 1/07/24 1900-1/08/24 0730  Cognitive Concerns/ Orientation: A&O x 4, forgetful   BEHAVIOR & AGGRESSION TOOL COLOR: Green             ABNL VS/O2: VSS, on RA, using 1 LPM as needed via nasal cannula. Baseline PRN oxygen use; LS diminished with crackles, scheduled nebs and chest physiotherapy  MOBILITY: Independent with walker  PAIN MANAGEMENT: Chronic low back pain - on scheduled Tylenol; PRN Oxycodone and PRN Flexerill given x1   DIET: Regular  BOWEL/BLADDER: Continent- using urinal; voided adequately; no BM this shift.   ABNL LAB/BG: , 121  DRAIN/DEVICES: No MD FER aware   SKIN: Scattered bruising/scabs; BLE jose j; abrasion/scab to R knee   TESTS/PROCEDURES: None  D/C DATE: Discharge to TCU vs home, SW following.  OTHER IMPORTANT INFO: Palliative following, possible palliative meeting with daughter on today to assist with goals of care and to assist with discharge plans.

## 2024-01-09 ENCOUNTER — APPOINTMENT (OUTPATIENT)
Dept: PHYSICAL THERAPY | Facility: CLINIC | Age: 79
DRG: 190 | End: 2024-01-09
Payer: MEDICARE

## 2024-01-09 LAB
CREAT SERPL-MCNC: 0.93 MG/DL (ref 0.67–1.17)
EGFRCR SERPLBLD CKD-EPI 2021: 85 ML/MIN/1.73M2
GLUCOSE BLDC GLUCOMTR-MCNC: 143 MG/DL (ref 70–99)
GLUCOSE BLDC GLUCOMTR-MCNC: 146 MG/DL (ref 70–99)
GLUCOSE BLDC GLUCOMTR-MCNC: 148 MG/DL (ref 70–99)
GLUCOSE BLDC GLUCOMTR-MCNC: 151 MG/DL (ref 70–99)
GLUCOSE BLDC GLUCOMTR-MCNC: 92 MG/DL (ref 70–99)

## 2024-01-09 PROCEDURE — 97116 GAIT TRAINING THERAPY: CPT | Mod: GP | Performed by: PHYSICAL THERAPY ASSISTANT

## 2024-01-09 PROCEDURE — 250N000012 HC RX MED GY IP 250 OP 636 PS 637: Performed by: STUDENT IN AN ORGANIZED HEALTH CARE EDUCATION/TRAINING PROGRAM

## 2024-01-09 PROCEDURE — 999N000157 HC STATISTIC RCP TIME EA 10 MIN

## 2024-01-09 PROCEDURE — 94640 AIRWAY INHALATION TREATMENT: CPT | Mod: 76

## 2024-01-09 PROCEDURE — 250N000009 HC RX 250: Performed by: HOSPITALIST

## 2024-01-09 PROCEDURE — 250N000009 HC RX 250: Performed by: STUDENT IN AN ORGANIZED HEALTH CARE EDUCATION/TRAINING PROGRAM

## 2024-01-09 PROCEDURE — 120N000001 HC R&B MED SURG/OB

## 2024-01-09 PROCEDURE — 94799 UNLISTED PULMONARY SVC/PX: CPT

## 2024-01-09 PROCEDURE — 94640 AIRWAY INHALATION TREATMENT: CPT

## 2024-01-09 PROCEDURE — 250N000013 HC RX MED GY IP 250 OP 250 PS 637: Performed by: STUDENT IN AN ORGANIZED HEALTH CARE EDUCATION/TRAINING PROGRAM

## 2024-01-09 PROCEDURE — 82565 ASSAY OF CREATININE: CPT | Performed by: HOSPITALIST

## 2024-01-09 PROCEDURE — 99232 SBSQ HOSP IP/OBS MODERATE 35: CPT | Performed by: STUDENT IN AN ORGANIZED HEALTH CARE EDUCATION/TRAINING PROGRAM

## 2024-01-09 PROCEDURE — 250N000013 HC RX MED GY IP 250 OP 250 PS 637: Performed by: HOSPITALIST

## 2024-01-09 PROCEDURE — 250N000011 HC RX IP 250 OP 636: Performed by: HOSPITALIST

## 2024-01-09 PROCEDURE — 36415 COLL VENOUS BLD VENIPUNCTURE: CPT | Performed by: HOSPITALIST

## 2024-01-09 PROCEDURE — 250N000013 HC RX MED GY IP 250 OP 250 PS 637: Performed by: INTERNAL MEDICINE

## 2024-01-09 RX ADMIN — AMLODIPINE BESYLATE 5 MG: 5 TABLET ORAL at 21:22

## 2024-01-09 RX ADMIN — AZITHROMYCIN DIHYDRATE 500 MG: 250 TABLET ORAL at 08:05

## 2024-01-09 RX ADMIN — DULOXETINE HYDROCHLORIDE 20 MG: 20 CAPSULE, DELAYED RELEASE ORAL at 08:05

## 2024-01-09 RX ADMIN — ACETAMINOPHEN 1000 MG: 500 TABLET, FILM COATED ORAL at 13:41

## 2024-01-09 RX ADMIN — ACETYLCYSTEINE 2 ML: 200 SOLUTION ORAL; RESPIRATORY (INHALATION) at 02:44

## 2024-01-09 RX ADMIN — ACETYLCYSTEINE 2 ML: 200 SOLUTION ORAL; RESPIRATORY (INHALATION) at 12:18

## 2024-01-09 RX ADMIN — ACETYLCYSTEINE 2 ML: 200 SOLUTION ORAL; RESPIRATORY (INHALATION) at 23:07

## 2024-01-09 RX ADMIN — IPRATROPIUM BROMIDE AND ALBUTEROL SULFATE 3 ML: .5; 3 SOLUTION RESPIRATORY (INHALATION) at 16:06

## 2024-01-09 RX ADMIN — GABAPENTIN 400 MG: 100 CAPSULE ORAL at 08:05

## 2024-01-09 RX ADMIN — METOPROLOL SUCCINATE 50 MG: 50 TABLET, EXTENDED RELEASE ORAL at 21:22

## 2024-01-09 RX ADMIN — CYCLOBENZAPRINE 10 MG: 10 TABLET, FILM COATED ORAL at 22:54

## 2024-01-09 RX ADMIN — TAMSULOSIN HYDROCHLORIDE 0.4 MG: 0.4 CAPSULE ORAL at 08:07

## 2024-01-09 RX ADMIN — INSULIN ASPART 1 UNITS: 100 INJECTION, SOLUTION INTRAVENOUS; SUBCUTANEOUS at 17:13

## 2024-01-09 RX ADMIN — PREDNISONE 20 MG: 20 TABLET ORAL at 08:06

## 2024-01-09 RX ADMIN — IPRATROPIUM BROMIDE AND ALBUTEROL SULFATE 3 ML: .5; 3 SOLUTION RESPIRATORY (INHALATION) at 02:44

## 2024-01-09 RX ADMIN — ACETAMINOPHEN 1000 MG: 500 TABLET, FILM COATED ORAL at 07:07

## 2024-01-09 RX ADMIN — GABAPENTIN 400 MG: 100 CAPSULE ORAL at 17:12

## 2024-01-09 RX ADMIN — ASPIRIN 81 MG: 81 TABLET, COATED ORAL at 21:22

## 2024-01-09 RX ADMIN — IPRATROPIUM BROMIDE AND ALBUTEROL SULFATE 3 ML: .5; 3 SOLUTION RESPIRATORY (INHALATION) at 12:18

## 2024-01-09 RX ADMIN — INSULIN ASPART 1 UNITS: 100 INJECTION, SOLUTION INTRAVENOUS; SUBCUTANEOUS at 12:37

## 2024-01-09 RX ADMIN — OXYCODONE HYDROCHLORIDE 5 MG: 5 TABLET ORAL at 22:54

## 2024-01-09 RX ADMIN — LEVOTHYROXINE SODIUM 125 MCG: 125 TABLET ORAL at 08:05

## 2024-01-09 RX ADMIN — IPRATROPIUM BROMIDE AND ALBUTEROL SULFATE 3 ML: .5; 3 SOLUTION RESPIRATORY (INHALATION) at 07:52

## 2024-01-09 RX ADMIN — ACETYLCYSTEINE 2 ML: 200 SOLUTION ORAL; RESPIRATORY (INHALATION) at 16:06

## 2024-01-09 RX ADMIN — ROSUVASTATIN CALCIUM 10 MG: 10 TABLET, FILM COATED ORAL at 21:22

## 2024-01-09 RX ADMIN — IPRATROPIUM BROMIDE AND ALBUTEROL SULFATE 3 ML: .5; 3 SOLUTION RESPIRATORY (INHALATION) at 23:07

## 2024-01-09 RX ADMIN — GABAPENTIN 400 MG: 100 CAPSULE ORAL at 21:22

## 2024-01-09 RX ADMIN — INSULIN ASPART 1 UNITS: 100 INJECTION, SOLUTION INTRAVENOUS; SUBCUTANEOUS at 09:03

## 2024-01-09 RX ADMIN — ACETAMINOPHEN 1000 MG: 500 TABLET, FILM COATED ORAL at 21:22

## 2024-01-09 RX ADMIN — PANTOPRAZOLE SODIUM 40 MG: 40 TABLET, DELAYED RELEASE ORAL at 07:07

## 2024-01-09 RX ADMIN — ENOXAPARIN SODIUM 40 MG: 40 INJECTION SUBCUTANEOUS at 12:37

## 2024-01-09 RX ADMIN — TRAZODONE HYDROCHLORIDE 50 MG: 50 TABLET ORAL at 21:22

## 2024-01-09 RX ADMIN — IPRATROPIUM BROMIDE AND ALBUTEROL SULFATE 3 ML: .5; 3 SOLUTION RESPIRATORY (INHALATION) at 19:55

## 2024-01-09 RX ADMIN — ASPIRIN 81 MG: 81 TABLET, COATED ORAL at 08:05

## 2024-01-09 RX ADMIN — ACETYLCYSTEINE 2 ML: 200 SOLUTION ORAL; RESPIRATORY (INHALATION) at 07:52

## 2024-01-09 RX ADMIN — DULOXETINE HYDROCHLORIDE 20 MG: 20 CAPSULE, DELAYED RELEASE ORAL at 21:22

## 2024-01-09 RX ADMIN — ACETYLCYSTEINE 2 ML: 200 SOLUTION ORAL; RESPIRATORY (INHALATION) at 19:56

## 2024-01-09 RX ADMIN — FUROSEMIDE 40 MG: 40 TABLET ORAL at 08:06

## 2024-01-09 ASSESSMENT — ACTIVITIES OF DAILY LIVING (ADL)
ADLS_ACUITY_SCORE: 39
ADLS_ACUITY_SCORE: 35
ADLS_ACUITY_SCORE: 37
ADLS_ACUITY_SCORE: 39
ADLS_ACUITY_SCORE: 37
ADLS_ACUITY_SCORE: 39
ADLS_ACUITY_SCORE: 37
ADLS_ACUITY_SCORE: 39

## 2024-01-09 NOTE — PROGRESS NOTES
[facility]    Medicine Progress Note - Hospitalist Service    Date of Admission:  12/6/2023  Date of Service: 01/09/2024    Assessment & Plan     Harrison Thomas is a 78 year old male with medical history significant for chronic hypoxic respiratory failure [has oxygen as needed at home], COPD, RUL lung mass s/p radiation, untreated BRENNEN, HTN, HLD, CAD, hypothyroidism admitted on 12/6/2023 with acute on chronic hypoxic respiratory failure likely 2/2 COPD exacerbation.      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  BRENNEN   Pulmonary edema  *Follows with Dr. Handy with pulmonology at [facility], last seen in September 2023.  On low dose chronic prednisone (2mg).  *Patient presented to the ED with increased shortness of breath over the past week, increased oxygen use at home.  *Admission clinical picture consistent with COPD exacerbation - poor air movement with diffuse expiratory wheezing - placed on steroids and nebs.  *BNP and procalcitonin wnl, COVID/flu/RSV negative on admission, resp viral panel negative (12/15).  *CT pulm angio 12/11 negative for PE, shows mild bronchial wall thickening and extensive emphysema.  *Underwent sniff test this admission that was negative.  *Received 5-day treatment course azithromycin at admission, resumed  mg every other day dosing for prophylaxis.  *Intermittently has required high flow nasal cannula and BiPAP  *[facility] pulmonary following.  *Transitioned from IV solumedrol to prednisone 12/20.  -Continue pantoprazole until tapered back to PTA dose of prednisone.  -Duonebs qid.  -Home inhaler on hold.  -Respiratory culture obtained 12/22/23, grew 1+ Candida albicans.  -[facility] pulmonary following, discussed with Dr. Connor on 12/29.  Has received empiric intermittent IV diuretics for potential volume overload with inconsistent  results.  -Hypoxia improving, patient down to 1 L oxygen via nasal cannula however continues to be severely short of breath with minimal activity  -As per pulmonary recommendations started on MetaNebs yesterday, RT following  -Prior hospitalist discussed with daughter on the phone and with the patient at the bedside.  He is still opposed to going to transitional care.  He has had a prolonged hospitalization and respiratory status is still not stable.  He is going to be superhigh risk for readmission with his tenuous pulmonary status.  I discussed palliative care evaluation which he is thinking about however he would like to discuss with pulmonary tomorrow to see if there are any other options for improvement.  -Clinical exam concerning for volume overload again, he is very crackly.  Was give Lasix 40 mg IV x 1.  Plan:  -Repeat chest x-ray 1/1/24 reveals nearly resolved right upper lobe opacity, and improved bibasilar opacities consistent with improving infection/inflammation. Cardiomediastinal silhouette is unremarkable.    -Palliative was consulted to assist with goals of care -> pursing TCU given improvement  -Repeat chest x-ray on 1/5/24 -follow-up results -> New platelike opacity within the right upper lung (similar to 12/23/2023) could reflect infectious/inflammatory process versus platelike atelectasis. Similar blunting of the right costophrenic angle, likely scarring/pleural thickening. No left pleural effusion. No convincing pneumothorax. Unchanged cardiomediastinal silhouette.  -SW consulted for TCU placement.  -Now on RA  -Continue Duonebs QID and Mucomyst QID (do with DuoNebs)  -Flutter valve following nebs QID     Hypernatremia - resolved  -Sodium now within normal limits.     Hypertension   Hyperlipidemia  Coronary artery disease  -Continue PTA metoprolol, amlodipine, aspirin, rosuvastatin     Chronic low back pain   *Patient is supposed to be getting a lumbar MRI soon.  Can be done as an  outpatient.  -Continue PTA oxycodone.  -Gabapentin changed from 600 mg bid to 400 mg tid, continue.  -Hold naproxen for now in the setting of high-dose steroids since he would be at high risk for upper GI bleed and ulcers.  -Oral PPI for prophylaxis.  -Continue PRN Flexeril.  -Scheduled Tylenol tid.  -Lidocaine patches.     Constipation, resolved  *Given concern for abdominal distention, obtained abdominal x-ray on 12/18 that still showed large amount of stool throughout the colon.  Started having bowel movement on 12/19/23 and has been having at least daily bowel movements since then.  -Continue on aggressive bowel regimen.     Acute urinary retention-resolved  *Required straight cath x3 12/20-12/21 and martinez placed.  -Started flomax 12/21/23.  -successful trial of voiding on 12/23/23.   -Continue to monitor for retention following catheter removal.  -Aggressively treat constipation as above.     Hx of papillary thyroid cancer s/p thyroidectomy   Hypothyroidism   -Continue PTA levothyroxine.     Insomnia   -Continue PTA Trazodone.     Leukocytosis  -Related to steroids and pneumonia.  -WBC back to normal.     Thrombocytopenia, resolved  Platelets in low 100s.  Not on any heparin products on admission through 12/20.  He is on baby aspirin twice daily, no signs of bleeding.  -Platelets 134 on 12/16/23, slowly trended down but now resolved  -Lovenox prophylaxis started 12/21/23 as platelets have trended up and are back to normal.     Bilateral pulmonary nodules  CT chest 12/11 showing scattered small bilateral pulmonary nodules measuring up to 5 mm are unchanged.  -Continued surveillance recommended.          Diet: Regular Diet Adult    DVT Prophylaxis: Enoxaparin (Lovenox) SQ  Martinez Catheter: Not present  Lines: None     Cardiac Monitoring: None  Code Status: No CPR- Do NOT Intubate      Clinically Significant Risk Factors              # Hypoalbuminemia: Lowest albumin = 3.4 g/dL at 12/8/2023 11:58 AM, will monitor  as appropriate     # Hypertension: Noted on problem list                   Disposition Plan at this time, patient is stable for discharge however he is insisting on discharging home despite TCU being recommended.  Discharging home will not be a safe option given his extensive COPD hypoxia and deconditioning.      Expected Discharge Date: 01/10/2024, 12:00 PM      Discharge Comments: pt is on iv solumedrol            Golden Logan MD  Hospitalist Service  [facility]  Securely message with [employer] (more info)  Text page via [employer] Paging/Directory   ______________________________________________________________________    Interval History     No acute events overnight  Patient notes he feels improved  Remains on RA  No CP/SOB  No fevers  No new nausea / vomiting  No new complaints, working on placement    Physical Exam   Vital Signs: Temp: 98.3  F (36.8  C) Temp src: Oral BP: 135/89 Pulse: 80   Resp: 20 SpO2: 90 % O2 Device: None (Room air)    Weight: 178 lbs 11.2 oz    General Appearance: Well appearing for stated age.  Respiratory: CTAB, no rales or ronchi  Cardiovascular: S1, S2 normal, no murmurs  GI: non-tender on palpation, BS present  NEURO: Grant  PSYCH: normal mood and affect      Medical Decision Making       ------------------ MEDICAL DECISION MAKING ------------------------------------------------------------------------------------------------------  Moderate Complexity Decision Making       Data     I have personally reviewed the following data over the past 24 hrs:    N/A  \   N/A   / N/A     N/A N/A N/A /  148 (H)   N/A N/A 0.93 \       Imaging results reviewed over the past 24 hrs:   No results found for this or any previous visit (from the past 24 hour(s)).

## 2024-01-09 NOTE — PLAN OF CARE
Summary: acute on chronic hypoxic respiratory failure d/t COPD exacerbation  DATE & TIME: 1/9/2024 1321-0030  Cognitive Concerns/ Orientation: Alert/Oriented x 4, forgetful at times   BEHAVIOR & AGGRESSION TOOL COLOR: Green             ABNL VS/O2:  VSS on RA. Baseline PRN oxygen use to keep sats >88%; Expiratory wheezing with coarse crackles, scheduled nebs and chest physiotherapy  MOBILITY: Independent with walker, up to chair. Ambulated today  PAIN MANAGEMENT: Chronic low back pain - on scheduled Tylenol; declined lidocaine patches and any other intervention this shift  DIET: Regular- good appetite, tolerating  BOWEL/BLADDER: Continent- using urinal, good UOP on lasix  ABNL LAB/BG: , 148  DRAIN/DEVICES: No MD FER aware   SKIN: Scattered bruising/scabs; BLE jose j; abrasion/scab to R knee   TESTS/PROCEDURES: None  D/C DATE: Discharge to TCU pending placement  OTHER IMPORTANT INFO: Palliative signed off, goals are restorative.

## 2024-01-09 NOTE — PLAN OF CARE
Goal Outcome Evaluation:  DATE & TIME: [id number]  Cognitive Concerns/ Orientation: A/O x4, Calm&cooperative but forgetful  BEHAVIOR & AGGRESSION TOOL COLOR:Green  ABNL VS/O2:VSS on 1 L NC-for overnights  MOBILITY:Independent w/ walker  PAIN MANAGMENT:Given flexeril and oxy x 1 for back pain  DIET:Cardiac diet  BOWEL/BLADDER:Continent-uses urinal  DRAIN/DEVICES:No PIV-MD aware  TELEMETRY RHYTHM:n/a  SKIN: Scattered bruising   TESTS/PROCEDURES:Na 146, B  D/C DATE:  Pending-possibly TCU  OTHER IMPORTANT INFO: Palliative following.

## 2024-01-09 NOTE — PLAN OF CARE
"Summary: acute on chronic hypoxic respiratory failure d/t COPD exacerbation     DATE & TIME: 1/08/2024 4638-6336  Cognitive Concerns/ Orientation: Alert/Oriented x 4, forgetful at times   BEHAVIOR & AGGRESSION TOOL COLOR: Green             ABNL VS/O2: /89 otherwise VSS, room air overnight with O2 sats 88-94%; Baseline PRN oxygen use; Expiratory wheezing with coarse crackles, scheduled nebs and chest physiotherapy  MOBILITY: Independent with walker  PAIN MANAGEMENT: Chronic low back pain - on scheduled Tylenol; PRN Oxycodone and PRN Flexeril; denied pain overnight \"I don't want to be taking those meds all the time.\"   DIET: Regular  BOWEL/BLADDER: Continent- using urinal    ABNL LAB/BG:   DRAIN/DEVICES: No MD FER aware   SKIN: Scattered bruising/scabs; BLE jose j; abrasion/scab to R knee   TESTS/PROCEDURES: None  D/C DATE: Discharge to TCU vs home, SW following.  OTHER IMPORTANT INFO: Palliative following, pt wanting limited restorative cares  "

## 2024-01-09 NOTE — PROGRESS NOTES
Care Management Follow Up    Length of Stay (days): 34    Expected Discharge Date: 01/10/2024     Concerns to be Addressed: discharge planning     Patient plan of care discussed at interdisciplinary rounds: Yes    Anticipated Discharge Disposition: Transitional Care     Anticipated Discharge Services:  TCU  Anticipated Discharge DME: Oxygen    Patient/family educated on Medicare website which has current facility and service quality ratings: yes  Education Provided on the Discharge Plan: Yes  Patient/Family in Agreement with the Plan: yes    Referrals Placed by CM/SW: Homecare, TCU referrals  Private pay costs discussed: Not applicable    Additional Information:  Patient has been here 34 days.  He at first refused a TCU and was accepted by [facility].  Then it was felt that due to level of hypoxia with activity and how deconditioned patient was, he would need to transition to a TCU to get a little stronger to be able to move about without great dyspnea.  Patient is now in agreement for a TCU and referrals have been out, with more referrals placed today.  Patient had a good experience at [facility] in the past and really wanted this facility, but they have declined.  The SW will take over discharge planning to a TCU    Liliana Calero, RN  Inpatient Care Management  [phone]

## 2024-01-09 NOTE — PLAN OF CARE
Goal Outcome Evaluation:    Summary: acute on chronic hypoxic respiratory failure d/t COPD exacerbation     DATE & TIME: 1/8/2024, 9202-1596  Cognitive Concerns/ Orientation: A&O x 4, forgetful   BEHAVIOR & AGGRESSION TOOL COLOR: Green             ABNL VS/O2: VSS, on RA. Using 1 LPM O2 PRN via NC at night. Baseline PRN oxygen use; LS diminished with crackles, scheduled nebs and chest physiotherapy completed by RT.   MOBILITY: Independent with walker to the BR  PAIN MANAGEMENT: Chronic lower back pain - on scheduled Tylenol; PRN Oxycodone and PRN Flexerill available.  Pt decline oxy this shift.  2 Lidocaine patches placed on lower back.   DIET: Regular, good intake  BOWEL/BLADDER: Continent - using urinal; voided adequately; 1 BM this shift in BR  ABNL LAB/BG: BG 87, 102, 116.    DRAIN/DEVICES: No MD FER aware   SKIN: Scattered bruising/scabs; BLE jose j; abrasion/scab to R knee   TESTS/PROCEDURES: None  D/C DATE: Discharge to TCU pending availability, refer to  note  OTHER IMPORTANT INFO: Palliative following. Palliative meeting with daughter today to assist with goals of care and to assist with discharge plans.

## 2024-01-10 VITALS
WEIGHT: 178.7 LBS | SYSTOLIC BLOOD PRESSURE: 143 MMHG | RESPIRATION RATE: 20 BRPM | OXYGEN SATURATION: 93 % | TEMPERATURE: 97.5 F | BODY MASS INDEX: 25.58 KG/M2 | HEART RATE: 93 BPM | DIASTOLIC BLOOD PRESSURE: 88 MMHG | HEIGHT: 70 IN

## 2024-01-10 LAB
GLUCOSE BLDC GLUCOMTR-MCNC: 122 MG/DL (ref 70–99)
GLUCOSE BLDC GLUCOMTR-MCNC: 80 MG/DL (ref 70–99)
GLUCOSE BLDC GLUCOMTR-MCNC: 99 MG/DL (ref 70–99)
PLATELET # BLD AUTO: 115 10E3/UL (ref 150–450)

## 2024-01-10 PROCEDURE — 250N000013 HC RX MED GY IP 250 OP 250 PS 637: Performed by: HOSPITALIST

## 2024-01-10 PROCEDURE — 999N000157 HC STATISTIC RCP TIME EA 10 MIN

## 2024-01-10 PROCEDURE — 36415 COLL VENOUS BLD VENIPUNCTURE: CPT | Performed by: STUDENT IN AN ORGANIZED HEALTH CARE EDUCATION/TRAINING PROGRAM

## 2024-01-10 PROCEDURE — 94640 AIRWAY INHALATION TREATMENT: CPT

## 2024-01-10 PROCEDURE — 250N000009 HC RX 250: Performed by: STUDENT IN AN ORGANIZED HEALTH CARE EDUCATION/TRAINING PROGRAM

## 2024-01-10 PROCEDURE — 99239 HOSP IP/OBS DSCHRG MGMT >30: CPT | Performed by: STUDENT IN AN ORGANIZED HEALTH CARE EDUCATION/TRAINING PROGRAM

## 2024-01-10 PROCEDURE — 250N000013 HC RX MED GY IP 250 OP 250 PS 637: Performed by: STUDENT IN AN ORGANIZED HEALTH CARE EDUCATION/TRAINING PROGRAM

## 2024-01-10 PROCEDURE — 94799 UNLISTED PULMONARY SVC/PX: CPT

## 2024-01-10 PROCEDURE — 94640 AIRWAY INHALATION TREATMENT: CPT | Mod: 76

## 2024-01-10 PROCEDURE — 250N000013 HC RX MED GY IP 250 OP 250 PS 637: Performed by: INTERNAL MEDICINE

## 2024-01-10 PROCEDURE — 85049 AUTOMATED PLATELET COUNT: CPT | Performed by: STUDENT IN AN ORGANIZED HEALTH CARE EDUCATION/TRAINING PROGRAM

## 2024-01-10 PROCEDURE — 250N000009 HC RX 250: Performed by: HOSPITALIST

## 2024-01-10 PROCEDURE — 250N000012 HC RX MED GY IP 250 OP 636 PS 637: Performed by: STUDENT IN AN ORGANIZED HEALTH CARE EDUCATION/TRAINING PROGRAM

## 2024-01-10 RX ORDER — METOPROLOL SUCCINATE 50 MG/1
50 TABLET, EXTENDED RELEASE ORAL EVERY EVENING
DISCHARGE
Start: 2024-01-10 | End: 2024-07-18

## 2024-01-10 RX ORDER — TAMSULOSIN HYDROCHLORIDE 0.4 MG/1
0.4 CAPSULE ORAL DAILY
DISCHARGE
Start: 2024-01-11 | End: 2024-04-26

## 2024-01-10 RX ORDER — PANTOPRAZOLE SODIUM 40 MG/1
40 TABLET, DELAYED RELEASE ORAL
DISCHARGE
Start: 2024-01-11 | End: 2024-01-22

## 2024-01-10 RX ORDER — BISACODYL 10 MG
10 SUPPOSITORY, RECTAL RECTAL DAILY PRN
DISCHARGE
Start: 2024-01-10 | End: 2024-01-18

## 2024-01-10 RX ORDER — PREDNISONE 5 MG/1
TABLET ORAL
DISCHARGE
Start: 2024-01-11 | End: 2024-01-26

## 2024-01-10 RX ORDER — OXYCODONE HYDROCHLORIDE 5 MG/1
5 TABLET ORAL EVERY 6 HOURS PRN
Qty: 15 TABLET | Refills: 0 | Status: SHIPPED | OUTPATIENT
Start: 2024-01-10 | End: 2024-01-18

## 2024-01-10 RX ORDER — POTASSIUM CHLORIDE 1500 MG/1
20 TABLET, EXTENDED RELEASE ORAL DAILY
DISCHARGE
Start: 2024-01-10 | End: 2024-02-26

## 2024-01-10 RX ORDER — IPRATROPIUM BROMIDE AND ALBUTEROL SULFATE 2.5; .5 MG/3ML; MG/3ML
3 SOLUTION RESPIRATORY (INHALATION) 4 TIMES DAILY
DISCHARGE
Start: 2024-01-10 | End: 2024-04-26

## 2024-01-10 RX ORDER — FUROSEMIDE 40 MG
40 TABLET ORAL DAILY
DISCHARGE
Start: 2024-01-11 | End: 2024-01-18

## 2024-01-10 RX ORDER — CALCIUM CARBONATE 500 MG/1
1 TABLET, CHEWABLE ORAL 4 TIMES DAILY PRN
DISCHARGE
Start: 2024-01-10 | End: 2024-01-18

## 2024-01-10 RX ORDER — POLYETHYLENE GLYCOL 3350 17 G/17G
17 POWDER, FOR SOLUTION ORAL DAILY
DISCHARGE
Start: 2024-01-10 | End: 2024-04-26

## 2024-01-10 RX ORDER — CARBOXYMETHYLCELLULOSE SODIUM 5 MG/ML
1 SOLUTION/ DROPS OPHTHALMIC
DISCHARGE
Start: 2024-01-10

## 2024-01-10 RX ORDER — GABAPENTIN 400 MG/1
400 CAPSULE ORAL 3 TIMES DAILY
DISCHARGE
Start: 2024-01-10 | End: 2024-04-18

## 2024-01-10 RX ORDER — ACETYLCYSTEINE 200 MG/ML
2 SOLUTION ORAL; RESPIRATORY (INHALATION) 4 TIMES DAILY
DISCHARGE
Start: 2024-01-10

## 2024-01-10 RX ADMIN — ACETAMINOPHEN 1000 MG: 500 TABLET, FILM COATED ORAL at 15:12

## 2024-01-10 RX ADMIN — FUROSEMIDE 40 MG: 40 TABLET ORAL at 09:50

## 2024-01-10 RX ADMIN — IPRATROPIUM BROMIDE AND ALBUTEROL SULFATE 3 ML: .5; 3 SOLUTION RESPIRATORY (INHALATION) at 11:26

## 2024-01-10 RX ADMIN — LEVOTHYROXINE SODIUM 125 MCG: 125 TABLET ORAL at 09:50

## 2024-01-10 RX ADMIN — TAMSULOSIN HYDROCHLORIDE 0.4 MG: 0.4 CAPSULE ORAL at 09:50

## 2024-01-10 RX ADMIN — PREDNISONE 10 MG: 10 TABLET ORAL at 09:50

## 2024-01-10 RX ADMIN — IPRATROPIUM BROMIDE AND ALBUTEROL SULFATE 3 ML: .5; 3 SOLUTION RESPIRATORY (INHALATION) at 03:06

## 2024-01-10 RX ADMIN — IPRATROPIUM BROMIDE AND ALBUTEROL SULFATE 3 ML: .5; 3 SOLUTION RESPIRATORY (INHALATION) at 15:13

## 2024-01-10 RX ADMIN — PANTOPRAZOLE SODIUM 40 MG: 40 TABLET, DELAYED RELEASE ORAL at 06:34

## 2024-01-10 RX ADMIN — ACETYLCYSTEINE 2 ML: 200 SOLUTION ORAL; RESPIRATORY (INHALATION) at 08:03

## 2024-01-10 RX ADMIN — GABAPENTIN 400 MG: 100 CAPSULE ORAL at 09:50

## 2024-01-10 RX ADMIN — ACETAMINOPHEN 1000 MG: 500 TABLET, FILM COATED ORAL at 06:34

## 2024-01-10 RX ADMIN — ACETYLCYSTEINE 2 ML: 200 SOLUTION ORAL; RESPIRATORY (INHALATION) at 03:06

## 2024-01-10 RX ADMIN — DULOXETINE HYDROCHLORIDE 20 MG: 20 CAPSULE, DELAYED RELEASE ORAL at 09:50

## 2024-01-10 RX ADMIN — ACETYLCYSTEINE 2 ML: 200 SOLUTION ORAL; RESPIRATORY (INHALATION) at 15:13

## 2024-01-10 RX ADMIN — OXYCODONE HYDROCHLORIDE 5 MG: 5 TABLET ORAL at 16:02

## 2024-01-10 RX ADMIN — ACETYLCYSTEINE 2 ML: 200 SOLUTION ORAL; RESPIRATORY (INHALATION) at 11:26

## 2024-01-10 RX ADMIN — CYCLOBENZAPRINE 10 MG: 10 TABLET, FILM COATED ORAL at 16:02

## 2024-01-10 RX ADMIN — GABAPENTIN 400 MG: 100 CAPSULE ORAL at 15:12

## 2024-01-10 RX ADMIN — IPRATROPIUM BROMIDE AND ALBUTEROL SULFATE 3 ML: .5; 3 SOLUTION RESPIRATORY (INHALATION) at 08:02

## 2024-01-10 RX ADMIN — ASPIRIN 81 MG: 81 TABLET, COATED ORAL at 09:50

## 2024-01-10 ASSESSMENT — ACTIVITIES OF DAILY LIVING (ADL)
ADLS_ACUITY_SCORE: 35
ADLS_ACUITY_SCORE: 33
ADLS_ACUITY_SCORE: 33
ADLS_ACUITY_SCORE: 35
ADLS_ACUITY_SCORE: 33

## 2024-01-10 NOTE — PLAN OF CARE
Summary: acute on chronic hypoxic respiratory failure d/t COPD exacerbation     DATE & TIME: 1/09/2024 8217-8928  Cognitive Concerns/ Orientation: Alert/Oriented x 4, forgetful at times   BEHAVIOR & AGGRESSION TOOL COLOR: Green             ABNL VS/O2: /83 otherwise VSS, room air overnight with O2 sats 88-94%; Baseline PRN oxygen use; Expiratory wheezing with coarse crackles, improving, scheduled nebs and chest physiotherapy  MOBILITY: Independent with walker  PAIN MANAGEMENT: Chronic low back pain - on scheduled Tylenol; PRN Oxycodone and PRN Flexeril; denied pain overnight   DIET: Regular  BOWEL/BLADDER: Continent- using urinal    ABNL LAB/BG:   DRAIN/DEVICES: No PIV, MD aware   SKIN: Scattered bruising/scabs; BLE jose j; abrasion/scab to R knee   TESTS/PROCEDURES: None  D/C DATE: Pending TCU placement  OTHER IMPORTANT INFO: Palliative signed off, restorative cares

## 2024-01-10 NOTE — PROVIDER NOTIFICATION
MD Notification    Notified Person: MD    Notified Person Name: Doreen     Notification Date/Time:  1/10/2024, 1555     Notification Interaction: mVisum messaging     Purpose of Notification:  Hi! Just a quick question about discharge meds. When would you like the patient to start potassium chloride? Thanks!     Orders Received: tomorrow.     Comments:

## 2024-01-10 NOTE — PROGRESS NOTES
Care Management Follow Up    Length of Stay (days): 34    Expected Discharge Date: 01/10/2024     Concerns to be Addressed: discharge planning     Patient plan of care discussed at interdisciplinary rounds: Yes    Anticipated Discharge Disposition: Transitional Care, Home Care     Anticipated Discharge Services:    Anticipated Discharge DME: Oxygen    Patient/family educated on Medicare website which has current facility and service quality ratings: yes  Education Provided on the Discharge Plan: Yes  Patient/Family in Agreement with the Plan: yes    Referrals Placed by CM/SW: TCU  Private pay costs discussed:     Additional Information:  Earlier today, Liliana Calero, spoke with patient regarding additional TCU referrals explaining he was declined by [facility].   Several facilities in the [facility] were recommended by writer based on reputation of the TCU's. As this time a few have declined and a few are still pending.  Late afternoon daughter arrived and request an update.  Update given.  Patient disappointed that [facility] is declining him.    The daughter reports CC RN had suggested consideration of the [facility]'s.  Reviewed the three facilities in [place] and [place].  Referrals are being sent to [facility] [place] [facility] in [place] and [facility] in [place].  Patient reports he has received positive feeback on [facility].  Writer assured patient and daughter they will be updated tomorrow. They are both aware patient is medically stable for discharge.    JOMAR Gonzalez

## 2024-01-10 NOTE — PLAN OF CARE
Summary: Acute on chronic hypoxic respiratory failure d/t COPD exacerbation  DATE & TIME: 01/10/24 8520-1868  Cognitive Concerns/ Orientation: A & O x 4, forgetful at times.   BEHAVIOR & AGGRESSION TOOL COLOR: Green             ABNL VS/O2: VSS on RA   MOBILITY: Independent with walker  PAIN MANAGEMENT: Chronic low back pain - on scheduled Tylenol; refused any other intervention this shift. Sleeping frequently.  DIET: Regular  BOWEL/BLADDER: Continent- using urinal    ABNL LAB/BG: BGM 80/99. Platelet 115.   DRAIN/DEVICES: No MD FER aware   SKIN: Dusky/frail skin. Scattered bruising/scabs; BLE jose j; abrasion/scab to R knee.   TESTS/PROCEDURES: None  D/C DATE: [facility] this evening.    OTHER IMPORTANT INFO: Baseline PRN oxygen use; Expiratory wheezing with coarse crackles, improving, scheduled nebs and chest physiotherapy. No changes. Stable.

## 2024-01-10 NOTE — PLAN OF CARE
Goal Outcome Evaluation:    Summary: acute on chronic hypoxic respiratory failure d/t COPD exacerbation  DATE & TIME: 1/9/2024 3631-0289  Cognitive Concerns/ Orientation: Alert/Oriented x 4, forgetful at times   BEHAVIOR & AGGRESSION TOOL COLOR: Green             ABNL VS/O2:  VSS on RA. Baseline PRN oxygen use to keep sats >88% - minimally used this shift; Inspiratory/expiratory wheezing with coarse crackles, scheduled nebs/chest physiotherapy  MOBILITY: Independent with walker, up to chair for . Ambulated in hallway x3 lowest sat following exertion 85% quickly recovered on RA to 92%  PAIN MANAGEMENT: Chronic low back pain - on scheduled Tylenol;  DIET: Regular- good appetite, tolerating  BOWEL/BLADDER: Continent- using urinal, adequate UOP on lasix  ABNL LAB/BG: , 92  DRAIN/DEVICES: No MD FER aware   SKIN: Scattered bruising/scabs; BLE jose j; abrasion/scab to R knee   TESTS/PROCEDURES: None  D/C DATE: Discharge to TCU pending placement  OTHER IMPORTANT INFO: Palliative signed off, goals are restorative.

## 2024-01-10 NOTE — PROGRESS NOTES
Care Management Follow Up    Length of Stay (days): 35    Expected Discharge Date: 01/11/2024     Concerns to be Addressed: discharge planning     Patient plan of care discussed at interdisciplinary rounds: Yes    Anticipated Discharge Disposition: Transitional Care, Home Care     Anticipated Discharge Services:    Anticipated Discharge DME: Oxygen    Patient/family educated on Medicare website which has current facility and service quality ratings: yes  Education Provided on the Discharge Plan: Yes  Patient/Family in Agreement with the Plan: yes    Referrals Placed by CM/SW: Homecare  Private pay costs discussed: private room/amenity fees and transportation costs    Additional Information:  Spoke with patient and daughter Gabriela over the phone.  [facility] patient admission today into a private room with extra fee of $40.00 or a semi-private room with $11.00 daily extra fee above what insurance covers.  Patient's preference of [facility] has not returned phone call or has not responded in DOD system.   They are accepting of [facility] option. Daughter however is concerned patient may be at increased risk if the TCU has patient's with Covid or RSV.  She shares fear she has with patient going home directly is that Pulmonologist told her if he goes home directly and comes back in respiratory distress there is little they can do for patient.  Care Coordinator shares that Dr Logan supports a TCU discharge plan.  Today when speaking with patient he did not express interest in going directly home.  Vocera message left for Dr Doreen Peña, [email or web address]

## 2024-01-10 NOTE — DISCHARGE INSTRUCTIONS
Please contact [facility] at [phone] to  the portable tank patient used for transport to the TCU.  Also please call [facility] at least two days prior to discharge to coordinate his home oxygen set up.  He currently has a home unit which is not hands free. Their phone # is [phone].

## 2024-01-11 ENCOUNTER — PATIENT OUTREACH (OUTPATIENT)
Dept: CARE COORDINATION | Facility: CLINIC | Age: 79
End: 2024-01-11

## 2024-01-11 ENCOUNTER — DOCUMENTATION ONLY (OUTPATIENT)
Dept: GERIATRICS | Facility: CLINIC | Age: 79
End: 2024-01-11

## 2024-01-11 ENCOUNTER — TRANSITIONAL CARE UNIT VISIT (OUTPATIENT)
Dept: GERIATRICS | Facility: CLINIC | Age: 79
End: 2024-01-11
Payer: MEDICARE

## 2024-01-11 VITALS
BODY MASS INDEX: 25.62 KG/M2 | DIASTOLIC BLOOD PRESSURE: 75 MMHG | HEART RATE: 67 BPM | OXYGEN SATURATION: 96 % | RESPIRATION RATE: 20 BRPM | TEMPERATURE: 97.1 F | WEIGHT: 179 LBS | HEIGHT: 70 IN | SYSTOLIC BLOOD PRESSURE: 151 MMHG

## 2024-01-11 DIAGNOSIS — G89.4 CHRONIC PAIN DISORDER: ICD-10-CM

## 2024-01-11 DIAGNOSIS — J96.21 ACUTE AND CHRONIC RESPIRATORY FAILURE WITH HYPOXIA (H): ICD-10-CM

## 2024-01-11 DIAGNOSIS — C78.00 MALIGNANT NEOPLASM METASTATIC TO LUNG, UNSPECIFIED LATERALITY (H): ICD-10-CM

## 2024-01-11 DIAGNOSIS — J70.0 RADIATION PNEUMONITIS (H): ICD-10-CM

## 2024-01-11 DIAGNOSIS — R53.81 PHYSICAL DECONDITIONING: ICD-10-CM

## 2024-01-11 DIAGNOSIS — I25.10 CORONARY ARTERY DISEASE INVOLVING NATIVE CORONARY ARTERY OF NATIVE HEART WITHOUT ANGINA PECTORIS: ICD-10-CM

## 2024-01-11 DIAGNOSIS — D84.9 IMMUNODEFICIENCY (H): ICD-10-CM
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
## 2024-01-11 DIAGNOSIS — J18.9 HOSPITAL-ACQUIRED PNEUMONIA: ICD-10-CM

## 2024-01-11 DIAGNOSIS — I10 ESSENTIAL HYPERTENSION, BENIGN: ICD-10-CM

## 2024-01-11 DIAGNOSIS — E89.0 POSTABLATIVE HYPOTHYROIDISM: ICD-10-CM

## 2024-01-11 DIAGNOSIS — J44.1 CHRONIC OBSTRUCTIVE PULMONARY DISEASE WITH ACUTE EXACERBATION (H): Primary | ICD-10-CM

## 2024-01-11 PROBLEM — M06.9 RHEUMATOID ARTHRITIS (H): Status: RESOLVED | Noted: 2019-08-15 | Resolved: 2024-01-11

## 2024-01-11 PROCEDURE — 99305 1ST NF CARE MODERATE MDM 35: CPT | Performed by: INTERNAL MEDICINE

## 2024-01-11 NOTE — LETTER
[facility]   To:   [facility]           Please give to facility    From:   Clari Funez  [facility]  Care Coordinator   [facility]   P: [phone]   [email or web address]   Patient Name:  Harrison Thomas YOB: 1945   Admit date: 1/10/24      *Information Needed:  Please contact me when the patient will discharge (or if they will move to long term care)- include the discharge date, disposition, and main diagnosis   If the patient is discharged with home care services, please provide the name of the agency    Also- Please inform me if a care conference is being held.   Phone, Fax or Email with information                              Thank you
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
## 2024-01-11 NOTE — PLAN OF CARE
Goal Outcome Evaluation:      Plan of Care Reviewed With: patient    Discharge    Patient discharged to [facility] TCU  via car with daughter around 1800 this evening.     Care plan note: A & O x 4. Calm and cooperative. VSS on RA -O2 stable around 93%. Chronic pain to lower back. Discharge/home medications sent with patient (inhalers). PIV removed. Home O2 sent with patient, and patient placed on 2 L while transferring to facility. AVS printed and explained to patient and daughter. Copy of AVS provided to daughter with patient approval. Questions answered and acknowledged.       Listed belongings gathered and given to patient (including from security/pharmacy). Yes  Care Plan and Patient education resolved: Yes  Prescriptions if needed, hard copies sent with patient  Yes  Medication Bin checked and emptied on discharge Yes  SW/care coordinator/charge RN aware of discharge: Yes    Ofelia Peña, RN on 1/10/2024 at 6:15 PM

## 2024-01-11 NOTE — PROGRESS NOTES
Clinic Care Coordination Contact  Care Coordination Transition Communication    Clinical Data: Patient was hospitalized at [facility]  from 12/6/23 to 1/10/24 with diagnosis of Acute COPD exacerbation  Hospital acquired pneumonia  Acute on chronic hypoxemic respiratory failure   Lung mass s/p radiation treatment and probable radiation pneumonitis.  BRENNEN   Pulmonary edema     Assessment: Patient has transitioned to TCU/ARU for short term rehabilitation:    Facility Name: [facility]  Transition Communication:  Notified facility of Primary Care- Care Coordination support via Epic fax.    Plan: Care Coordinator will await notification from facility staff informing of patient's discharge plans/needs. Care Coordinator will review chart and outreach to facility staff every 4 weeks and as needed.     Clari Funez,  [facility]  Clinic Care Coordinator  [facility]  [phone]  [email or web address]

## 2024-01-11 NOTE — PROGRESS NOTES
Care Management Discharge Note    Discharge Date: 01/10/2024       Discharge Disposition: Transitional Care, Home Care    Discharge Services:      Discharge DME: Oxygen    Discharge Transportation:      Private pay costs discussed: private room/amenity fees    Does the patient's insurance plan have a 3 day qualifying hospital stay waiver?  No    PAS Confirmation Code: [id number]  Patient/family educated on Medicare website which has current facility and service quality ratings: yes    Education Provided on the Discharge Plan: Yes  Persons Notified of Discharge Plans: patient and daughter   Patient/Family in Agreement with the Plan: yes    Handoff Referral Completed: Yes    Additional Information:  Relayed to daughter that Dr Logan does strongly support TCU.  Explained currently POB only has one case of COVID and it is not on the TCU that patient is discharging to.  Daughter accepted this information and relayed to patient her recommendation he discharged to [facility] for a short TCU stay.   Prior to discharge orders were sent via [employer] and script faxed. Daughter chose to transport her father. Daughter has the address for POB and knows the staff cannot  help patient out of the care but can wheel a w/c out to the car. She knows to see the  when she arrives.   Prior to discharge met with patient.  He did ask what the TCU Medicare rating is and writer shared it is a 4 out of 5. Also explained the facility is used much and patients/families seem pleased with the facility based on feedback. Pt did not oppose the TCU plan.   Both daughter and patient asked how home therapies can be arranged.after TCU.  Writer shared if recommended by the TCU team, the TCU SW will arrange.   Daughter concerned patient will need a  portable oxygen at home in which he can use without the need to wheel the tank while using his walker. Writer spoke to his oxygen provider which is [facility].  They gave direction as  to how to arrange this prior to his discharge from TCU and this information was placed on his discharge instructions which populate in the AVS and Transfer form.  [facility] also delivered a portable tank patient can use during transport today.  Effective date: 1/10/24  PA reference  [id number]  Pt. notified:   Yes   Pt. informed directly.     Kasandra Peña, LICSW

## 2024-01-13 ENCOUNTER — LAB REQUISITION (OUTPATIENT)
Dept: LAB | Facility: CLINIC | Age: 79
End: 2024-01-13
Payer: MEDICARE

## 2024-01-13 DIAGNOSIS — D69.6 THROMBOCYTOPENIA, UNSPECIFIED (H): ICD-10-CM

## 2024-01-15 ENCOUNTER — TRANSITIONAL CARE UNIT VISIT (OUTPATIENT)
Dept: GERIATRICS | Facility: CLINIC | Age: 79
End: 2024-01-15
Payer: MEDICARE

## 2024-01-15 VITALS
HEIGHT: 70 IN | TEMPERATURE: 97.8 F | WEIGHT: 177.7 LBS | SYSTOLIC BLOOD PRESSURE: 119 MMHG | RESPIRATION RATE: 18 BRPM | BODY MASS INDEX: 25.44 KG/M2 | HEART RATE: 94 BPM | OXYGEN SATURATION: 92 % | DIASTOLIC BLOOD PRESSURE: 68 MMHG

## 2024-01-15 DIAGNOSIS — I10 ESSENTIAL HYPERTENSION, BENIGN: ICD-10-CM
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
## 2024-01-15 DIAGNOSIS — J70.0 RADIATION PNEUMONITIS (H): ICD-10-CM

## 2024-01-15 DIAGNOSIS — J18.9 HOSPITAL-ACQUIRED PNEUMONIA: ICD-10-CM

## 2024-01-15 DIAGNOSIS — R53.81 PHYSICAL DECONDITIONING: ICD-10-CM

## 2024-01-15 DIAGNOSIS — C78.00 MALIGNANT NEOPLASM METASTATIC TO LUNG, UNSPECIFIED LATERALITY (H): ICD-10-CM

## 2024-01-15 DIAGNOSIS — I25.10 CORONARY ARTERY DISEASE INVOLVING NATIVE CORONARY ARTERY OF NATIVE HEART WITHOUT ANGINA PECTORIS: ICD-10-CM

## 2024-01-15 DIAGNOSIS — J44.1 CHRONIC OBSTRUCTIVE PULMONARY DISEASE WITH ACUTE EXACERBATION (H): Primary | ICD-10-CM

## 2024-01-15 LAB
ANION GAP SERPL CALCULATED.3IONS-SCNC: 9 MMOL/L (ref 7–15)
BUN SERPL-MCNC: 13 MG/DL (ref 8–23)
CALCIUM SERPL-MCNC: 8.6 MG/DL (ref 8.8–10.2)
CHLORIDE SERPL-SCNC: 103 MMOL/L (ref 98–107)
CREAT SERPL-MCNC: 0.99 MG/DL (ref 0.67–1.17)
DEPRECATED HCO3 PLAS-SCNC: 33 MMOL/L (ref 22–29)
EGFRCR SERPLBLD CKD-EPI 2021: 79 ML/MIN/1.73M2
ERYTHROCYTE [DISTWIDTH] IN BLOOD BY AUTOMATED COUNT: 15.9 % (ref 10–15)
GLUCOSE SERPL-MCNC: 70 MG/DL (ref 70–99)
HCT VFR BLD AUTO: 39 % (ref 40–53)
HGB BLD-MCNC: 12 G/DL (ref 13.3–17.7)
MCH RBC QN AUTO: 29.9 PG (ref 26.5–33)
MCHC RBC AUTO-ENTMCNC: 30.8 G/DL (ref 31.5–36.5)
MCV RBC AUTO: 97 FL (ref 78–100)
PLATELET # BLD AUTO: 121 10E3/UL (ref 150–450)
POTASSIUM SERPL-SCNC: 4.3 MMOL/L (ref 3.4–5.3)
RBC # BLD AUTO: 4.01 10E6/UL (ref 4.4–5.9)
SODIUM SERPL-SCNC: 145 MMOL/L (ref 135–145)
WBC # BLD AUTO: 7.7 10E3/UL (ref 4–11)

## 2024-01-15 PROCEDURE — 36415 COLL VENOUS BLD VENIPUNCTURE: CPT | Performed by: INTERNAL MEDICINE

## 2024-01-15 PROCEDURE — 85027 COMPLETE CBC AUTOMATED: CPT | Performed by: INTERNAL MEDICINE

## 2024-01-15 PROCEDURE — 99309 SBSQ NF CARE MODERATE MDM 30: CPT | Performed by: NURSE PRACTITIONER

## 2024-01-15 PROCEDURE — 80048 BASIC METABOLIC PNL TOTAL CA: CPT | Performed by: INTERNAL MEDICINE

## 2024-01-15 PROCEDURE — P9604 ONE-WAY ALLOW PRORATED TRIP: HCPCS | Performed by: INTERNAL MEDICINE

## 2024-01-15 NOTE — PROGRESS NOTES
[place] [facility]  [place] Medical Record Number:  [id number]  Place of Service where encounter took place:  [facility] ([facility]) [[id number]]  Chief Complaint   Patient presents with    Nursing Home Acute       HPI:    Harrison Thomas  is a 78 year old (1945), who is being seen today for an episodic care visit.  HPI information obtained from: facility chart records, facility staff, patient report, and [facility] chart review. Today's concern is:says feels better, no pain except a sore throat,breathing fine.     Chronic obstructive pulmonary disease with acute exacerbation (H)  Hospital-acquired pneumonia  Malignant neoplasm metastatic to lung, unspecified laterality (H)  Radiation pneumonitis (H24)  Essential hypertension, benign  Coronary artery disease involving native coronary artery of native heart without angina pectoris  Physical deconditioning     Past Medical and Surgical History reviewed in Epic today.    MEDICATIONS:     Current Outpatient Medications   Medication Sig Dispense Refill    acetaminophen (TYLENOL) 500 MG tablet Take 1,000 mg by mouth every 8 hours as needed      acetylcysteine (MUCOMYST) 20 % neb solution Take 2 mLs by nebulization 4 times daily      albuterol (ACCUNEB) 1.25 MG/3ML neb solution Take 1 vial (1.25 mg) by nebulization every 4 hours as needed for shortness of breath or wheezing 90 mL 4    albuterol (PROAIR HFA/PROVENTIL HFA/VENTOLIN HFA) 108 (90 Base) MCG/ACT inhaler INHALE 2 PUFFS BY MOUTH EVERY 6 HOURS AS NEEDED FOR WHEEZE OR FOR SHORTNESS OF BREATH 18 g 3    alendronate (FOSAMAX) 70 MG tablet Take 1 tablet (70 mg) by mouth every 7 days 12 tablet 3    amLODIPine (NORVASC) 5 MG tablet Take 1 tablet (5 mg) by mouth at bedtime 90 tablet 3    ascorbic acid 1000 MG TABS tablet Take 1,000 mg by mouth daily      aspirin 81 MG EC tablet Take 81 mg by mouth 2 times daily      azithromycin (ZITHROMAX) 500 MG tablet Take 1 tablet (500 mg)  by mouth every other day 31 tablet 3    bisacodyl (DULCOLAX) 10 MG suppository Place 1 suppository (10 mg) rectally daily as needed for constipation      calcium carbonate (OS-KARLIE) 1500 (600 Ca) MG tablet Take 600 mg by mouth daily      calcium carbonate (TUMS) 500 MG chewable tablet Take 1 tablet (500 mg) by mouth 4 times daily as needed for heartburn      carboxymethylcellulose PF (REFRESH PLUS) 0.5 % ophthalmic solution Place 1 drop into both eyes every hour as needed for dry eyes      cholecalciferol 25 MCG (1000 UT) TABS Take 1,000 Units by mouth daily       cyclobenzaprine (FLEXERIL) 5 MG tablet Take 1 tablet (5 mg) by mouth nightly as needed for muscle spasms 21 tablet 1    DULoxetine (CYMBALTA) 20 MG capsule Take 1 capsule (20 mg) by mouth 2 times daily 180 capsule 3    Fluticasone-Umeclidin-Vilant (TRELEGY ELLIPTA) 100-62.5-25 MCG/ACT oral inhaler Inhale 1 puff into the lungs daily 90 each 3    furosemide (LASIX) 40 MG tablet Take 1 tablet (40 mg) by mouth daily      gabapentin (NEURONTIN) 400 MG capsule Take 1 capsule (400 mg) by mouth 3 times daily      guaiFENesin (MUCINEX) 600 MG 12 hr tablet Take 600 mg by mouth 2 times daily as needed for congestion      hydrocortisone 2.5 % cream Apply topically 2 times daily as needed for itching      ipratropium - albuterol 0.5 mg/2.5 mg/3 mL (DUONEB) 0.5-2.5 (3) MG/3ML neb solution Take 1 vial (3 mLs) by nebulization 4 times daily      levothyroxine (SYNTHROID/LEVOTHROID) 125 MCG tablet Take 1 tablet (125 mcg) by mouth daily 90 tablet 3    MegaRed Omega-3 Krill Oil 500 MG CAPS Take 500 mg by mouth daily      Melatonin 10 MG TABS tablet Take 10 mg by mouth At Bedtime      metoprolol succinate ER (TOPROL XL) 50 MG 24 hr tablet Take 1 tablet (50 mg) by mouth every evening      multivitamin w/minerals (THERA-VIT-M) tablet Take 1 tablet by mouth daily       naloxone (NARCAN) 4 MG/0.1ML nasal spray Spray 1 spray (4 mg) into one nostril alternating nostrils once as  "needed for opioid reversal every 2-3 minutes until assistance arrives 0.2 mL 3    nitroGLYcerin (NITROSTAT) 0.4 MG sublingual tablet FOR CHEST PAIN PLACE 1 TAB UNDER TONGUE EVERY 5 MIN FOR 3 DOSES. IF SYMPTOMS PERSIST 5 MIN AFTER 1ST DOSE CALL 911 25 tablet 3    oxyCODONE (ROXICODONE) 5 MG tablet Take 1 tablet (5 mg) by mouth every 6 hours as needed for moderate pain 15 tablet 0    pantoprazole (PROTONIX) 40 MG EC tablet Take 1 tablet (40 mg) by mouth every morning (before breakfast)      polyethylene glycol (MIRALAX) 17 GM/Dose powder Take 17 g by mouth daily      potassium chloride ER (KLOR-CON M) 20 MEQ CR tablet Take 1 tablet (20 mEq) by mouth daily      predniSONE (DELTASONE) 5 MG tablet Take 2 tablets (10 mg) by mouth daily for 7 days, THEN 1 tablet (5 mg) daily for 7 days, THEN 0.5 tablets (2.5 mg) daily for 30 days.      rosuvastatin (CRESTOR) 10 MG tablet TAKE 1 TABLET BY MOUTH EVERY DAY 90 tablet 3    senna-docusate (SENOKOT-S/PERICOLACE) 8.6-50 MG tablet Take 1 tablet by mouth 2 times daily as needed for constipation      tamsulosin (FLOMAX) 0.4 MG capsule Take 1 capsule (0.4 mg) by mouth daily      traZODone (DESYREL) 50 MG tablet Take 50 mg by mouth At Bedtime        TODAY DURING EXAM/ROS:  No CP, SOB, Cough, dizziness, fevers, chills, HA, N/V, dysuria or Bowel Abnormalities. Appetite is good.  No pain today except sore throat      Objective:  /68   Pulse 94   Temp 97.8  F (36.6  C)   Resp 18   Ht 1.778 m (5' 10\")   Wt 80.6 kg (177 lb 11.2 oz)   SpO2 92%   BMI 25.50 kg/m    Exam:  GENERAL APPEARANCE:  Alert, in no distress, oriented, cooperative  ENT:  Mouth and posterior oropharynx normal, moist mucous membranes, normal hearing acuity, no redness or plaques in throat  EYES:  EOM, conjunctivae, lids, pupils and irises normal  RESP:  respiratory effort and palpation of chest normal, lungs clear to auscultation , no respiratory distress, diminished breath sounds thru out, rare crackle.  CV:  " Palpation and auscultation of heart done , RRR, ??soft systolic murmur.  No rub, or gallop. +2 edema feet to below knees Left > Right.  ABDOMEN:  normal bowel sounds, soft, nontender, no hepatosplenomegaly or other masses  M/S:   CHRISTIE equally and up in chair, walks with therapies  SKIN:  Inspection of skin and subcutaneous tissue baseline  NEURO:   Cranial nerves 2-12 are normal tested and grossly at patient's baseline  PSYCH:  oriented X 3    Labs:   Recent Labs   Lab Test 01/15/24  0801 01/07/24  0843    146*   POTASSIUM 4.3 3.6  3.6   CHLORIDE 103 105   CO2 33* 33*   ANIONGAP 9 8   GLC 70 80   BUN 13.0 10.9   CR 0.99 0.79   KARLIE 8.6* 8.7*    < > = values in this interval not displayed.   CBC RESULTS:   Recent Labs   Lab Test 01/15/24  0801   WBC 7.7   RBC 4.01*   HGB 12.0*   HCT 39.0*   MCV 97   MCH 29.9   MCHC 30.8*   RDW 15.9*   *     ASSESSMENT / PLAN:  (J44.1) Chronic obstructive pulmonary disease with acute exacerbation (H)  (primary encounter diagnosis)  (J18.9,  Y95) Hospital-acquired pneumonia  Comment/Plan: finishing  Abx, cont nebs, Pred taper, other resp meds, improved, good sats, monitor.    (C78.00) Malignant neoplasm metastatic to lung, unspecified laterality (H)  (J70.0) Radiation pneumonitis (H24)  Comment/Plan: see above, cont Pred taper--improving, monitor.    (I10) Essential hypertension, benign  (I25.10) Coronary artery disease involving native coronary artery of native heart without angina pectoris  Comment/Plan: 117-140 SBP mostly,  HR is 70-80's cont same POC meds, BMP fine    (R53.81) Physical deconditioning  Comment/Plan: PT OT    Electronically signed by:  PREETI Mckeon CNP

## 2024-01-18 ENCOUNTER — DISCHARGE SUMMARY NURSING HOME (OUTPATIENT)
Dept: GERIATRICS | Facility: CLINIC | Age: 79
End: 2024-01-18
Payer: MEDICARE

## 2024-01-18 VITALS
DIASTOLIC BLOOD PRESSURE: 74 MMHG | WEIGHT: 177 LBS | OXYGEN SATURATION: 95 % | TEMPERATURE: 98 F | BODY MASS INDEX: 25.34 KG/M2 | HEIGHT: 70 IN | HEART RATE: 74 BPM | SYSTOLIC BLOOD PRESSURE: 141 MMHG | RESPIRATION RATE: 18 BRPM

## 2024-01-18 DIAGNOSIS — J18.9 HOSPITAL-ACQUIRED PNEUMONIA: ICD-10-CM

## 2024-01-18 DIAGNOSIS — I10 ESSENTIAL HYPERTENSION, BENIGN: ICD-10-CM

## 2024-01-18 DIAGNOSIS — J70.0 RADIATION PNEUMONITIS (H): ICD-10-CM

## 2024-01-18 DIAGNOSIS — J44.1 CHRONIC OBSTRUCTIVE PULMONARY DISEASE WITH ACUTE EXACERBATION (H): Primary | ICD-10-CM

## 2024-01-18 DIAGNOSIS — C78.00 MALIGNANT NEOPLASM METASTATIC TO LUNG, UNSPECIFIED LATERALITY (H): ICD-10-CM

## 2024-01-18 DIAGNOSIS — R53.81 PHYSICAL DECONDITIONING: ICD-10-CM
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
## 2024-01-18 DIAGNOSIS — I25.10 CORONARY ARTERY DISEASE INVOLVING NATIVE CORONARY ARTERY OF NATIVE HEART WITHOUT ANGINA PECTORIS: ICD-10-CM

## 2024-01-18 PROCEDURE — 99316 NF DSCHRG MGMT 30 MIN+: CPT | Performed by: NURSE PRACTITIONER

## 2024-01-18 RX ORDER — OXYCODONE HYDROCHLORIDE 5 MG/1
5 TABLET ORAL EVERY 8 HOURS PRN
COMMUNITY
Start: 2024-01-18 | End: 2024-01-24

## 2024-01-18 RX ORDER — FUROSEMIDE 20 MG
20-40 TABLET ORAL EVERY OTHER DAY
COMMUNITY
End: 2024-04-18
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
## 2024-01-18 NOTE — Clinical Note
Leonel Dozier, Any way you or a partner can see this man in the next 1-2 weeks? He was in hospital for 4-5 weeks.  He should not wait til April.   He goes home on 1/20--see my discharge note. Thanks Leonela

## 2024-01-19 ENCOUNTER — TELEPHONE (OUTPATIENT)
Dept: FAMILY MEDICINE | Facility: CLINIC | Age: 79
End: 2024-01-19
Payer: MEDICARE

## 2024-01-19 NOTE — TELEPHONE ENCOUNTER
Home Care is calling regarding an established patient with [facility].       Requesting orders from: Yaneli Dozier  Provider is following patient: Yes  Is this a 60-day recertification request?  No    Orders Requested    Skilled Nursing  Request for delay in care, service is not able to be provided within same scheduled day.   Rescheduled for 1/22 due to patient request.  Patient TCU following COPD exacerbation.       Confirmed ok to leave a detailed message with call back.  Contact information confirmed and updated as needed.    Silvia Durham RN

## 2024-01-19 NOTE — TELEPHONE ENCOUNTER
Dr. Dozier,    Pt discharged from TCU today and has several follow up concerns.      1. Would like outpt PT started asap.  TCU did not write referral, and pt hoping to go to [facility].  Pended for review.  2. Asking if he should increase lasix due to Leg swelling - currently on 20mg daily.   Leg from surgery - left leg is swollen, especially noticeably in calf, right leg is slightly swollen.  Wife asking if pt should increase lasix.  Creat is well wnl, but denies any sob, breathing concerns, denies wet cough, other weight gain, reports home BP WNL. Denies redness, red streaks, heat, or particular pain other than surgical in calf.   Provided teaching that that swelling is expected after surgery, may take some time to get surgical fluids off body etc. Advised to call Ortho, but they advised to call here.    3. Should pt have a hospital follow up with you?      Nora Irwin, RN  [facility] RN Triage Team     Hub staff attempted to follow warm transfer process and was unsuccessful     Caller: Anu Jones A    Relationship to patient: Self    Best call back number: [phone]    Patient is needing: PT HAS CALLED IN BEFORE REGARDING PAPERWORK FOR MOST RECENT PROCEDURE THAT WAS DONE ON 1/17/24. HER EMPLOYER HAS RECEIVED PAPERWORK FOR THE ONE THAT WAS DONE ON 12/14/23 MULTIPLE TIMES BUT IS TELLING HER THEY HAVE NOT RECEIVED THE PAPERWORK BACK FOR THE MOST RECENT ONE YET.    NEEDS TO BE FAXED TO [employer], PT IS A [employer] EMPLOYEE.    WOULD LIKE A CALL BACK, OK TO LEAVE VM

## 2024-01-22 ENCOUNTER — TELEPHONE (OUTPATIENT)
Dept: FAMILY MEDICINE | Facility: CLINIC | Age: 79
End: 2024-01-22
Payer: MEDICARE

## 2024-01-22 DIAGNOSIS — J44.1 CHRONIC OBSTRUCTIVE PULMONARY DISEASE WITH ACUTE EXACERBATION (H): ICD-10-CM

## 2024-01-22 DIAGNOSIS — J44.9 COPD, VERY SEVERE (H): ICD-10-CM

## 2024-01-22 DIAGNOSIS — M54.50 ACUTE RIGHT-SIDED LOW BACK PAIN WITHOUT SCIATICA: Primary | ICD-10-CM

## 2024-01-22 NOTE — TELEPHONE ENCOUNTER
Homecare RN was called with approval of SN orders.     Doctor Tasia please advice if you approve and overbook hospital follow up visit with you this week . Pt only wants to see PCP. Pt also needs gabapentin 100 mg capsule orders in order for him to take 400 mg tablets TID. Pt only has 300 mg gabapentin at home.

## 2024-01-22 NOTE — TELEPHONE ENCOUNTER
Home Care is calling regarding an established patient with [facility].        1/19/2024     1:54 PM   Home Care Information   Date of Home Care episode start 1/22/2024   Current following provider Dr. Tasia Mann provider agreed to follow 1/22/2024    Name/Phone Number Isaac RN,[phone]   Home Care agency [facility]     Requesting orders from: Yaneli Dozier  Provider is following patient: Yes  Is this a 60-day recertification request?  No    Orders Requested    Skilled Nursing  Request for initial certification (first set of orders) Frequency 1 visit next week.  1 visit every other week for 6 weeks.    Physical Therapy  Request for initial evaluation and treatment (one time)   Occupational Therapy  Request for initial evaluation and treatment (one time)       Confirmed ok to leave a detailed message with call back.  Contact information confirmed and updated as needed.    RN needs a call back with approval of skilled nursing order. RN also wants to go over medications. Pt has Gabapentin 300 mg pills at home. Nurse needs to clarify if pt should be taking gabapentin 400 mg tablet TID, If he is, pt would need a gabapentin 100 mg prescription sent to his pharmacy. Pt also has other medication discrepancies . Should pt be taking Azithromycin,  Flomax, Pantoprazole Mucinex or Melatonin. Writer advised to have pt schedule hospital follow up visit. Pt only wants to see PCP. Does PCP approve overbook visit this week? If yes, when?

## 2024-01-23 RX ORDER — PANTOPRAZOLE SODIUM 40 MG/1
40 TABLET, DELAYED RELEASE ORAL
Qty: 90 TABLET | Refills: 1 | Status: SHIPPED | OUTPATIENT
Start: 2024-01-23 | End: 2024-04-22

## 2024-01-23 RX ORDER — GABAPENTIN 100 MG/1
100 CAPSULE ORAL 3 TIMES DAILY
Qty: 270 CAPSULE | Refills: 3 | Status: SHIPPED | OUTPATIENT
Start: 2024-01-23 | End: 2024-01-26

## 2024-01-23 RX ORDER — AZITHROMYCIN 500 MG/1
500 TABLET, FILM COATED ORAL EVERY OTHER DAY
Qty: 31 TABLET | Refills: 3 | Status: SHIPPED | OUTPATIENT
Start: 2024-01-23 | End: 2024-01-26

## 2024-01-24 ENCOUNTER — TELEPHONE (OUTPATIENT)
Dept: FAMILY MEDICINE | Facility: CLINIC | Age: 79
End: 2024-01-24
Payer: MEDICARE

## 2024-01-24 DIAGNOSIS — M54.41 CHRONIC RIGHT-SIDED LOW BACK PAIN WITH RIGHT-SIDED SCIATICA: ICD-10-CM

## 2024-01-24 DIAGNOSIS — G89.29 CHRONIC RIGHT-SIDED LOW BACK PAIN WITH RIGHT-SIDED SCIATICA: ICD-10-CM

## 2024-01-24 DIAGNOSIS — M54.50 ACUTE RIGHT-SIDED LOW BACK PAIN WITHOUT SCIATICA: ICD-10-CM
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
## 2024-01-24 NOTE — TELEPHONE ENCOUNTER
BK  I will be on vacation soon,    Team appointment is ok if my schedule does not have opening.

## 2024-01-24 NOTE — TELEPHONE ENCOUNTER
Jorge PT with [facility] calling to request verbal orders for home care. See order details below.       Home Care is calling regarding an established patient with  [employer] [facility].        1/19/2024     1:54 PM   Home Care Information   Date of Home Care episode start 1/22/2024   Current following provider Dr. Tasia Mann provider agreed to follow 1/22/2024    Name/Phone Number Isaac MCCLOUD,[phone]   Home Care agency [facility]     Requesting orders from: Yaneli Dozier  Provider is following patient: Yes  Is this a 60-day recertification request?  No    Orders Requested    Physical Therapy  Request for continuation of care with no increase or decrease in frequency  Frequency: 1x per week for 3 weeks, 1x every other week for 5 weeks.         Verbal orders given.  Home Care will send orders for provider to sign.  Confirmed ok to leave a detailed message with call back.  Contact information confirmed and updated as needed.    Justice L. Phoenix, RN

## 2024-01-24 NOTE — TELEPHONE ENCOUNTER
Patient states that Dr. Dozier ordered the oxycodone. However, it is listed as historical med, patient reported. Patient states that Dr. Dozier has ordered in the past. Please advise.     ROGERS Toro  [facility]

## 2024-01-25 ENCOUNTER — TELEPHONE (OUTPATIENT)
Dept: FAMILY MEDICINE | Facility: CLINIC | Age: 79
End: 2024-01-25
Payer: MEDICARE

## 2024-01-25 ENCOUNTER — PATIENT OUTREACH (OUTPATIENT)
Dept: CARE COORDINATION | Facility: CLINIC | Age: 79
End: 2024-01-25
Payer: MEDICARE

## 2024-01-25 RX ORDER — CYCLOBENZAPRINE HCL 5 MG
5 TABLET ORAL
Qty: 21 TABLET | Refills: 1 | Status: ON HOLD | OUTPATIENT
Start: 2024-01-25 | End: 2024-04-28

## 2024-01-25 RX ORDER — CYCLOBENZAPRINE HCL 5 MG
5 TABLET ORAL
Qty: 21 TABLET | Refills: 1 | Status: SHIPPED | OUTPATIENT
Start: 2024-01-25 | End: 2024-01-26

## 2024-01-25 RX ORDER — OXYCODONE HYDROCHLORIDE 5 MG/1
5 TABLET ORAL EVERY 8 HOURS PRN
Qty: 90 TABLET | Refills: 0 | Status: SHIPPED | OUTPATIENT
Start: 2024-01-25 | End: 2024-03-01

## 2024-01-25 NOTE — TELEPHONE ENCOUNTER
"Pt, along with his home care RN Janel, called the clinic stating that he has had a rash on his feet for \"quite awhile\", and wants to make sure it is okay to take Benadryl for it along with his other prescriptions.     He states he will buy it over the counter if this is approved by PCP.     Routing to PCP to advise.     Thank you,  Rebecca Salmeron, RN    "

## 2024-01-25 NOTE — DISCHARGE INSTRUCTIONS
Thyrogen Discharge Instructions    Activity:    You may go back to your normal routine  Do not have sex for a week starting today. It is vital that you do not pass radiation to your partner, and that you prevent pregnancy.    Diet:    Stay on your low-iodine diet until after your scan - if you have been instructed to do so by you provider    Medicines:    Keep taking your regular medications     Side Effects:    You may have mild nausea, vomiting, headache, dizziness and/or fatigue  Side effects should be gone within 48 hours after your second dose of thyrogen            Follow Up Appointments:    Follow up with your primary care provider as needed    Call your primary care provider if:    Chills or a fever greater than 101 F (38 C)  Hives, a rash or unusual itching  Problems breathing  Any questions or concerns        If you have questions call:          [facility] @ [phone]   
home

## 2024-01-25 NOTE — LETTER
[facility] CARE COORDINATION  [address]  [place] [address]    January 25, 2024    Harrison Thomas  [address]  [place] [address]      Dear Harrison,    I am a clinic care coordinator who works with Yaneli Dozier MD with the [facility]. I wanted to introduce myself and provide you with my contact information for you to be able to call me with any questions or concerns. Below is a description of clinic care coordination and how I can further assist you.       The clinic care coordination team is made up of a registered nurse, , financial resource worker and community health worker who understand the health care system. The goal of clinic care coordination is to help you manage your health and improve access to the health care system. Our team works alongside your provider to assist you in determining your health and social needs. We can help you obtain health care and community resources, providing you with necessary information and education. We can work with you through any barriers and develop a care plan that helps coordinate and strengthen the communication between you and your care team.  Our services are voluntary and are offered without charge to you personally.    Please feel free to contact me with any questions or concerns regarding care coordination and what we can offer.      We are focused on providing you with the highest-quality healthcare experience possible.    Sincerely,     Clari Funez, [facility]  Clinic Care Coordinator  [facility]  [phone]

## 2024-01-25 NOTE — PROGRESS NOTES
Clinic Care Coordination Contact  [facility]/Voicemail    Clinical Data: Care Coordinator Outreach    Outreach Documentation Number of Outreach Attempt   1/25/2024   9:39 AM 1       Left message on patient's voicemail with call back information and requested return call.    Plan: Care Coordinator will send care coordination introduction letter with care coordinator contact information and explanation of care coordination services via Figure 1. Care Coordinator will try to reach patient again in 1-2 business days.    Clari Funez,  [facility]  Clinic Care Coordinator  [facility]  [phone]  [email or web address]

## 2024-01-26 ENCOUNTER — OFFICE VISIT (OUTPATIENT)
Dept: FAMILY MEDICINE | Facility: CLINIC | Age: 79
End: 2024-01-26
Payer: MEDICARE

## 2024-01-26 VITALS
BODY MASS INDEX: 25.48 KG/M2 | SYSTOLIC BLOOD PRESSURE: 148 MMHG | DIASTOLIC BLOOD PRESSURE: 78 MMHG | HEIGHT: 70 IN | TEMPERATURE: 96.9 F | WEIGHT: 178 LBS | OXYGEN SATURATION: 92 % | RESPIRATION RATE: 22 BRPM | HEART RATE: 72 BPM

## 2024-01-26 DIAGNOSIS — C78.00 MALIGNANT NEOPLASM METASTATIC TO LUNG, UNSPECIFIED LATERALITY (H): ICD-10-CM

## 2024-01-26 DIAGNOSIS — J44.9 COPD, VERY SEVERE (H): ICD-10-CM

## 2024-01-26 DIAGNOSIS — G89.29 CHRONIC RIGHT-SIDED LOW BACK PAIN WITH RIGHT-SIDED SCIATICA: ICD-10-CM

## 2024-01-26 DIAGNOSIS — M54.41 CHRONIC RIGHT-SIDED LOW BACK PAIN WITH RIGHT-SIDED SCIATICA: ICD-10-CM

## 2024-01-26 DIAGNOSIS — S32.000G LUMBAR COMPRESSION FRACTURE, WITH DELAYED HEALING, SUBSEQUENT ENCOUNTER: Primary | ICD-10-CM

## 2024-01-26 DIAGNOSIS — J18.9 COMMUNITY ACQUIRED PNEUMONIA OF RIGHT LUNG, UNSPECIFIED PART OF LUNG: ICD-10-CM

## 2024-01-26 DIAGNOSIS — L85.3 DRY SKIN: ICD-10-CM

## 2024-01-26 DIAGNOSIS — I25.10 ATHEROSCLEROSIS OF NATIVE CORONARY ARTERY OF NATIVE HEART WITHOUT ANGINA PECTORIS: ICD-10-CM

## 2024-01-26 DIAGNOSIS — F11.90 CHRONIC, CONTINUOUS USE OF OPIOIDS: ICD-10-CM

## 2024-01-26 DIAGNOSIS — Z51.81 ENCOUNTER FOR THERAPEUTIC DRUG LEVEL MONITORING: ICD-10-CM

## 2024-01-26 PROBLEM — J44.1 COPD EXACERBATION (H): Status: RESOLVED | Noted: 2023-12-06 | Resolved: 2024-01-26

## 2024-01-26 PROBLEM — S01.81XA FACIAL LACERATION, INITIAL ENCOUNTER: Status: RESOLVED | Noted: 2023-06-12 | Resolved: 2024-01-26

## 2024-01-26 PROBLEM — S22.39XA CLOSED FRACTURE OF ONE RIB: Status: RESOLVED | Noted: 2023-01-09 | Resolved: 2024-01-26

## 2024-01-26 PROBLEM — J96.21 ACUTE ON CHRONIC RESPIRATORY FAILURE WITH HYPOXIA (H): Status: RESOLVED | Noted: 2023-01-11 | Resolved: 2024-01-26

## 2024-01-26 PROBLEM — S22.32XA CLOSED FRACTURE OF ONE RIB OF LEFT SIDE, INITIAL ENCOUNTER: Status: RESOLVED | Noted: 2023-01-11 | Resolved: 2024-01-26

## 2024-01-26 PROBLEM — N17.0 ACUTE KIDNEY FAILURE WITH TUBULAR NECROSIS (H): Status: RESOLVED | Noted: 2023-01-15 | Resolved: 2024-01-26

## 2024-01-26 PROBLEM — S72.001A CLOSED FRACTURE OF NECK OF RIGHT FEMUR, INITIAL ENCOUNTER (H): Status: RESOLVED | Noted: 2023-06-12 | Resolved: 2024-01-26

## 2024-01-26 LAB — CREAT UR-MCNC: 82 MG/DL

## 2024-01-26 PROCEDURE — G0481 DRUG TEST DEF 8-14 CLASSES: HCPCS | Performed by: INTERNAL MEDICINE

## 2024-01-26 PROCEDURE — 99214 OFFICE O/P EST MOD 30 MIN: CPT | Performed by: INTERNAL MEDICINE

## 2024-01-26 RX ORDER — AZITHROMYCIN 500 MG/1
500 TABLET, FILM COATED ORAL EVERY OTHER DAY
Qty: 45 TABLET | Refills: 3 | Status: SHIPPED | OUTPATIENT
Start: 2024-01-26 | End: 2024-06-05

## 2024-01-26 RX ORDER — PREDNISONE 5 MG/1
2.5 TABLET ORAL DAILY
COMMUNITY
Start: 2024-01-26 | End: 2024-03-19

## 2024-01-26 ASSESSMENT — PAIN SCALES - GENERAL: PAINLEVEL: NO PAIN (0)

## 2024-01-26 NOTE — NURSING NOTE
"BP:  BP (!) 148/78   Pulse 72   Temp 96.9  F (36.1  C) (Temporal)   Resp 22   Ht 1.778 m (5' 10\")   Wt 80.7 kg (178 lb)   SpO2 92%   BMI 25.54 kg/m       72  Disposition: provider notified while patient in the clinic   "

## 2024-01-26 NOTE — PROGRESS NOTES
Assessment & Plan     Lumbar compression fracture, with delayed healing, subsequent encounter  Patient will do MRI and continue gabapentin and oxycodone  He will also continue duloxetine  He will continue Fosamax for osteoporosis      COPD, very severe (H)  Patient is on Trelegy and we will add Zithromax and taper prednisone  Patient Instructions   Repeat CT scan in 2 weeks to 1 month    Pulmonary rehab    Azithromycin 500 mg a day    Prednisone continued 5 mg a day and on Monday reduced to 2.5 mg a day for 1 week then stop      MRI of the spine is scheduled    High-protein diet    - CT Chest w/o Contrast  - azithromycin (ZITHROMAX) 500 MG tablet  Dispense: 45 tablet; Refill: 3  - predniSONE (DELTASONE) 5 MG tablet  - Pulmonary Rehab Referral  - CBC with Platelets      Community acquired pneumonia of right lung, unspecified part of lung  As above- CT Chest w/o Contrast  - predniSONE (DELTASONE) 5 MG tablet  - Pulmonary Rehab Referral  - CBC with Platelets      Atherosclerosis of native coronary artery of native heart without angina pectoris  Patient has known coronary disease but we will repeat blood pressure on return  - Comprehensive metabolic panel  - Lipid panel reflex to direct LDL Fasting    Malignant neoplasm metastatic to lung, unspecified laterality (H)  As above we will make sure that the pneumonia has resolved  - CT Chest w/o Contrast  - Pulmonary Rehab Referral    Chronic right-sided low back pain with right-sided sciatica  On chronic gabapentin and has been worked up    Chronic, continuous use of opioids  As above and oxycodone was filled  - Drug Confirmation Panel Urine with Creat - lab collect  - Drug Confirmation Panel Urine with Creat - lab collect    Dry skin  Dry skin care was discussed and he may need local steroids    Encounter for therapeutic drug level monitoring    - Drug Confirmation Panel Urine with Creat - lab collect  - Drug Confirmation Panel Urine with Creat - lab  "collect            MED REC REQUIRED  Post Medication Reconciliation Status:  Discharge medications reconciled and changed, see notes/orders  BMI  Estimated body mass index is 25.54 kg/m  as calculated from the following:    Height as of this encounter: 1.778 m (5' 10\").    Weight as of this encounter: 80.7 kg (178 lb).           Artur Terry is a 78 year old, presenting for the following health issues:  Hospital F/U    HPI   Patient was admitted to the hospital and found to have shortness of breath and COPD exacerbation and also heart failure  He has a known lung mass s/p radiation and there is thyroid lung cancer there  He is seeing endocrinologist    On the day of admission patient got short of breath and was found to have bilateral pneumonia but more on the right side  He also has known sleep apnea  He is currently on prednisone taper and was sent to a rehab unit because he also had compression fractures  He did not have anything go from the sputum    He was mildly anemic and his albumin level was low on the admission  He is feeling better now but back has been painful  He and his daughter have multiple questions about medications    Hospital Follow-up Visit:    Hospital/Nursing Home/IP Rehab Facility: [facility]  Date of Admission: 12/06/2023  Date of Discharge: 01/10/2024 and 2 weeks after that in rehab unit and came home last week  Reason(s) for Admission:     Acute COPD exacerbation  Hospital acquired pneumonia  Acute on chronic hypoxemic respiratory failure   Lung mass s/p radiation treatment and probable radiation pneumonitis.  BRENNEN   Pulmonary edema       Was your hospitalization related to COVID-19? No   Problems taking medications regularly:  None  Medication changes since discharge: None  Problems adhering to non-medication therapy:  None    Summary of hospitalization:  [facility] discharge summary reviewed  Diagnostic Tests/Treatments reviewed.  Follow up " "needed: none  Other Healthcare Providers Involved in Patient s Care:         None  Update since discharge: improved.         Plan of care communicated with patient and family               10 point ROS of systems including Constitutional, Eyes, Respiratory, Cardiovascular, Gastroenterology, Genitourinary, Integumentary, Muscularskeletal, Psychiatric were all negative except for pertinent positives noted in my HPI.        Objective    BP (!) 148/78   Pulse 72   Temp 96.9  F (36.1  C) (Temporal)   Resp 22   Ht 1.778 m (5' 10\")   Wt 80.7 kg (178 lb)   SpO2 92%   BMI 25.54 kg/m    Body mass index is 25.54 kg/m .  Physical Exam   GENERAL: alert and no distress  NECK: no adenopathy, no asymmetry, masses, or scars  RESP: lungs clear to auscultation - no rales, rhonchi or wheezes  CV: regular rate and rhythm, normal S1 S2, no S3 or S4, no murmur, click or rub, no peripheral edema  MS: no gross musculoskeletal defects noted, no edema    Feet are dry with discoloration      Patient Active Problem List   Diagnosis    Essential hypertension, benign    Pain in joint, upper arm    Allergic rhinitis    Pain in joint, lower leg    Chronic airway obstruction (H)    Monoclonal paraproteinemia    Immunodeficiency (H24)    Eczema    Transient cerebral ischemia    Bunion    Occipital neuralgia    Hyperlipidemia LDL goal <100    Health Care Home    Low back pain    Olecranon bursitis of right elbow    Bruit    Retina hole    signed & scanned on 09/23/2011  (1-4-2013 printed but not scanned in)     Chronic, continuous use of opioids    Atherosclerosis of native coronary artery of native heart without angina pectoris    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, unspecified thyrotoxicosis type    Right-sided low back pain with right-sided sciatica    Coronary artery disease involving native coronary artery of native heart without angina pectoris    BRENNEN (obstructive sleep apnea)    Hammer toe of right foot    Hallux limitus of right foot "    Corn of toe    Non-recurrent unilateral inguinal hernia with obstruction without gangrene    Left inguinal hernia    Metastasis from thyroid cancer (H)    Pulmonary nodules    Preoperative examination    Chronic pain disorder    Dehydration    Shock (H)    CALLUM (acute kidney injury) (H24)    COPD, very severe (H)    Hypokalemia    Hypoxia    Spleen hematoma, initial encounter    Fall, initial encounter    SAH (subarachnoid hemorrhage) (H)    Malignant neoplasm metastatic to lung, unspecified laterality (H)    Hospital-acquired pneumonia    Radiation pneumonitis (H24)    Physical deconditioning     Lab Requisition on 01/15/2024   Component Date Value Ref Range Status    Sodium 01/15/2024 145  135 - 145 mmol/L Final    Reference intervals for this test were updated on 09/26/2023 to more accurately reflect our healthy population. There may be differences in the flagging of prior results with similar values performed with this method. Interpretation of those prior results can be made in the context of the updated reference intervals.     Potassium 01/15/2024 4.3  3.4 - 5.3 mmol/L Final    Chloride 01/15/2024 103  98 - 107 mmol/L Final    Carbon Dioxide (CO2) 01/15/2024 33 (H)  22 - 29 mmol/L Final    Anion Gap 01/15/2024 9  7 - 15 mmol/L Final    Urea Nitrogen 01/15/2024 13.0  8.0 - 23.0 mg/dL Final    Creatinine 01/15/2024 0.99  0.67 - 1.17 mg/dL Final    GFR Estimate 01/15/2024 79  >60 mL/min/1.73m2 Final    Calcium 01/15/2024 8.6 (L)  8.8 - 10.2 mg/dL Final    Glucose 01/15/2024 70  70 - 99 mg/dL Final    WBC Count 01/15/2024 7.7  4.0 - 11.0 10e3/uL Final    RBC Count 01/15/2024 4.01 (L)  4.40 - 5.90 10e6/uL Final    Hemoglobin 01/15/2024 12.0 (L)  13.3 - 17.7 g/dL Final    Hematocrit 01/15/2024 39.0 (L)  40.0 - 53.0 % Final    MCV 01/15/2024 97  78 - 100 fL Final    MCH 01/15/2024 29.9  26.5 - 33.0 pg Final    MCHC 01/15/2024 30.8 (L)  31.5 - 36.5 g/dL Final    RDW 01/15/2024 15.9 (H)  10.0 - 15.0 % Final     Platelet Count 01/15/2024 121 (L)  150 - 450 10e3/uL Final         Signed Electronically by: Yaneli Dozier MD

## 2024-01-26 NOTE — PATIENT INSTRUCTIONS
Repeat CT scan in 2 weeks to 1 month    Pulmonary rehab    Azithromycin 500 mg a day    Prednisone continued 5 mg a day and on Monday reduced to 2.5 mg a day for 1 week then stop      MRI of the spine is scheduled    High-protein diet

## 2024-01-31 LAB
GABAPENTIN UR QL CFM: PRESENT
OXYCODONE UR CFM-MCNC: 565 NG/ML
OXYCODONE/CREAT UR: 689 NG/MG {CREAT}

## 2024-02-09 ENCOUNTER — ANCILLARY PROCEDURE (OUTPATIENT)
Dept: CT IMAGING | Facility: CLINIC | Age: 79
End: 2024-02-09
Attending: INTERNAL MEDICINE
Payer: MEDICARE

## 2024-02-09 DIAGNOSIS — J18.9 COMMUNITY ACQUIRED PNEUMONIA OF RIGHT LUNG, UNSPECIFIED PART OF LUNG: ICD-10-CM

## 2024-02-09 DIAGNOSIS — J44.9 COPD, VERY SEVERE (H): ICD-10-CM

## 2024-02-09 DIAGNOSIS — C78.00 MALIGNANT NEOPLASM METASTATIC TO LUNG, UNSPECIFIED LATERALITY (H): ICD-10-CM

## 2024-02-09 PROCEDURE — 71250 CT THORAX DX C-: CPT | Mod: MG

## 2024-02-23 ENCOUNTER — MYC MEDICAL ADVICE (OUTPATIENT)
Dept: FAMILY MEDICINE | Facility: CLINIC | Age: 79
End: 2024-02-23
Payer: MEDICARE

## 2024-02-23 DIAGNOSIS — J44.9 COPD, VERY SEVERE (H): Primary | ICD-10-CM

## 2024-02-23 DIAGNOSIS — C78.00 MALIGNANT NEOPLASM METASTATIC TO LUNG, UNSPECIFIED LATERALITY (H): ICD-10-CM

## 2024-02-26 DIAGNOSIS — E87.6 HYPOKALEMIA: ICD-10-CM

## 2024-02-26 DIAGNOSIS — J44.9 COPD, VERY SEVERE (H): ICD-10-CM

## 2024-02-26 RX ORDER — POTASSIUM CHLORIDE 1500 MG/1
20 TABLET, EXTENDED RELEASE ORAL 2 TIMES DAILY
Qty: 180 TABLET | Refills: 3 | Status: SHIPPED | OUTPATIENT
Start: 2024-02-26

## 2024-02-26 RX ORDER — PREDNISONE 1 MG/1
2 TABLET ORAL DAILY
Qty: 60 TABLET | Refills: 4 | Status: SHIPPED | OUTPATIENT
Start: 2024-02-26 | End: 2024-07-26

## 2024-03-01 ENCOUNTER — TELEPHONE (OUTPATIENT)
Dept: FAMILY MEDICINE | Facility: CLINIC | Age: 79
End: 2024-03-01
Payer: MEDICARE

## 2024-03-01 DIAGNOSIS — G89.29 CHRONIC RIGHT-SIDED LOW BACK PAIN WITH RIGHT-SIDED SCIATICA: ICD-10-CM

## 2024-03-01 DIAGNOSIS — M54.41 CHRONIC RIGHT-SIDED LOW BACK PAIN WITH RIGHT-SIDED SCIATICA: ICD-10-CM

## 2024-03-01 DIAGNOSIS — M54.50 ACUTE RIGHT-SIDED LOW BACK PAIN WITHOUT SCIATICA: ICD-10-CM

## 2024-03-01 RX ORDER — OXYCODONE HYDROCHLORIDE 5 MG/1
5 TABLET ORAL EVERY 8 HOURS PRN
Qty: 90 TABLET | Refills: 0 | Status: SHIPPED | OUTPATIENT
Start: 2024-03-01 | End: 2024-03-29

## 2024-03-01 NOTE — TELEPHONE ENCOUNTER
Pt called requesting Oxycodone medication refill. He is out of medication.     Routing refill request to provider for review/approval because:  Drug not on the [facility] refill protocol

## 2024-03-01 NOTE — TELEPHONE ENCOUNTER
Home Care is calling regarding an established patient with [facility].        1/19/2024     1:54 PM   Home Care Information   Date of Home Care episode start 1/22/2024   Current following provider Dr. Tasia Mann provider agreed to follow 1/22/2024    Name/Phone Number Isaac MCCLOUD,[phone]   Home Care agency [facility]     Requesting orders from: Yaneli Dozier  Provider is following patient: Yes  Is this a 60-day recertification request?  No    Orders Requested    Physical Therapy  Request for delay in care, service is not able to be provided within same scheduled day.   Was scheduled for 2/28/24. Rescheduled for week of 3/4-3/8.  Delayed related to staffing    Confirmed ok to leave a detailed message with call back.  Contact information confirmed and updated as needed.    Khoa Morris RN

## 2024-03-05 NOTE — TELEPHONE ENCOUNTER
Home Care is calling regarding an established patient with [facility].        1/19/2024     1:54 PM   Home Care Information   Date of Home Care episode start 1/22/2024   Current following provider Dr. Dozier   Date provider agreed to follow 1/22/2024    Name/Phone Number Isaac MCCLOUD,[phone]   Home Care agency [facility]     Requesting orders from: Yaneli Dozier  Provider is following patient: Yes  Is this a 60-day recertification request?  No    Orders Requested    Physical Therapy  Request for discontinuation of care   Goals have been met/progressing.          Verbal orders given.  Home Care will send orders for provider to sign.  Confirmed ok to leave a detailed message with call back.  Contact information confirmed and updated as needed.      Nora Irwin RN

## 2024-03-07 ENCOUNTER — OFFICE VISIT (OUTPATIENT)
Dept: ENDOCRINOLOGY | Facility: CLINIC | Age: 79
End: 2024-03-07
Payer: MEDICARE

## 2024-03-07 VITALS
HEART RATE: 69 BPM | SYSTOLIC BLOOD PRESSURE: 131 MMHG | BODY MASS INDEX: 25.97 KG/M2 | DIASTOLIC BLOOD PRESSURE: 79 MMHG | WEIGHT: 181 LBS

## 2024-03-07 DIAGNOSIS — C73 PAPILLARY THYROID CARCINOMA (H): ICD-10-CM

## 2024-03-07 DIAGNOSIS — E89.0 POSTSURGICAL HYPOTHYROIDISM: Primary | ICD-10-CM

## 2024-03-07 PROCEDURE — G2211 COMPLEX E/M VISIT ADD ON: HCPCS | Performed by: INTERNAL MEDICINE

## 2024-03-07 PROCEDURE — 99214 OFFICE O/P EST MOD 30 MIN: CPT | Performed by: INTERNAL MEDICINE
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
## 2024-03-08 ENCOUNTER — NURSE TRIAGE (OUTPATIENT)
Dept: FAMILY MEDICINE | Facility: CLINIC | Age: 79
End: 2024-03-08
Payer: MEDICARE

## 2024-03-08 ENCOUNTER — TELEPHONE (OUTPATIENT)
Dept: ENDOCRINOLOGY | Facility: CLINIC | Age: 79
End: 2024-03-08
Payer: MEDICARE

## 2024-03-08 NOTE — TELEPHONE ENCOUNTER
[facility] Call Center    Phone Message    May a detailed message be left on voicemail: yes     Reason for Call: Other: Patient calling to see if there is a pair of glasses on a desk in one of the rooms at the end of the [place]ir[place]. He would like to make sure someone looks as he paid over 700.00 for them and he can't find them anywhere else.      Writer called the back line and nothing was turned in however patient didn't let it go and that is why this message is being sent. Please call him either way so that he knows. Thank you    Action Taken: Message routed to:  [facility] ([facility] Endo    Travel Screening: Not Applicable

## 2024-03-08 NOTE — TELEPHONE ENCOUNTER
"  FYI - Status Update    Who is Calling: [place] with [facility] - [phone]    Update: for dr. Dozier - calling to report a fall, pt states \"did hit head and left hip\", has no recollection of how fall happened    Does caller want a call/response back: No  "

## 2024-03-08 NOTE — TELEPHONE ENCOUNTER
RN checked empty exam rooms, and did not find any glasses.  Called pt to notify.  He still hasn't found them.  Danielle Gaiatn RN

## 2024-03-11 NOTE — TELEPHONE ENCOUNTER
"    Patient Contact    Attempt # 1    Was call answered?  Yes. Writer relayed PCP's message above, patient expressed verbal understanding. Patient states that he will not be going to ED today unless \"something changes\". Writer offered patient an OV with team tomorrow, patient declined stating, \"I don't know what good it would do to see someone else [not PCP]\". Writer reiterated protocol recommendation to be evaluated emergently, patient expressed verbal understanding and again declined ED or OV with team. Writer advised patient to call back immediately with new or worsening symptoms, or in the even of another syncopal episode, patient expressed verbal understanding.    Rachelle Alvarez RN  -[facility]    "
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
## 2024-03-19 ENCOUNTER — ANCILLARY PROCEDURE (OUTPATIENT)
Dept: GENERAL RADIOLOGY | Facility: CLINIC | Age: 79
End: 2024-03-19
Attending: PHYSICIAN ASSISTANT
Payer: MEDICARE

## 2024-03-19 ENCOUNTER — NURSE TRIAGE (OUTPATIENT)
Dept: FAMILY MEDICINE | Facility: CLINIC | Age: 79
End: 2024-03-19

## 2024-03-19 ENCOUNTER — OFFICE VISIT (OUTPATIENT)
Dept: FAMILY MEDICINE | Facility: CLINIC | Age: 79
End: 2024-03-19
Payer: MEDICARE

## 2024-03-19 VITALS
WEIGHT: 179 LBS | DIASTOLIC BLOOD PRESSURE: 82 MMHG | HEART RATE: 88 BPM | OXYGEN SATURATION: 94 % | BODY MASS INDEX: 25.62 KG/M2 | TEMPERATURE: 97.8 F | RESPIRATION RATE: 26 BRPM | SYSTOLIC BLOOD PRESSURE: 127 MMHG | HEIGHT: 70 IN

## 2024-03-19 DIAGNOSIS — S09.90XA CLOSED HEAD INJURY, INITIAL ENCOUNTER: ICD-10-CM

## 2024-03-19 DIAGNOSIS — J44.1 COPD WITH EXACERBATION (H): ICD-10-CM

## 2024-03-19 DIAGNOSIS — S50.01XA CONTUSION OF RIGHT ELBOW, INITIAL ENCOUNTER: ICD-10-CM
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
## 2024-03-19 PROCEDURE — 73080 X-RAY EXAM OF ELBOW: CPT | Mod: TC | Performed by: RADIOLOGY

## 2024-03-19 PROCEDURE — 73030 X-RAY EXAM OF SHOULDER: CPT | Mod: TC | Performed by: RADIOLOGY

## 2024-03-19 PROCEDURE — 71046 X-RAY EXAM CHEST 2 VIEWS: CPT | Mod: TC | Performed by: RADIOLOGY

## 2024-03-19 PROCEDURE — 99214 OFFICE O/P EST MOD 30 MIN: CPT | Performed by: PHYSICIAN ASSISTANT

## 2024-03-19 RX ORDER — PREDNISONE 20 MG/1
40 TABLET ORAL DAILY
Qty: 10 TABLET | Refills: 0 | Status: SHIPPED | OUTPATIENT
Start: 2024-03-19 | End: 2024-04-17

## 2024-03-19 RX ORDER — METHOTREXATE 2.5 MG/1
TABLET ORAL
COMMUNITY
End: 2024-04-23

## 2024-03-19 RX ORDER — METHIMAZOLE 5 MG/1
5 TABLET ORAL DAILY
COMMUNITY
End: 2024-04-23

## 2024-03-19 RX ORDER — LEVALBUTEROL INHALATION SOLUTION 0.31 MG/3ML
0.31 SOLUTION RESPIRATORY (INHALATION)
COMMUNITY
End: 2024-04-26

## 2024-03-19 RX ORDER — ROFLUMILAST 500 UG/1
TABLET ORAL
COMMUNITY
End: 2024-04-26

## 2024-03-19 ASSESSMENT — PAIN SCALES - GENERAL: PAINLEVEL: EXTREME PAIN (8)

## 2024-03-19 NOTE — RESULT ENCOUNTER NOTE
Leonel Terry,     Here are your shoulder x-rays which are negative for fracture.     Please let us know if you have any questions or concerns.    Regards,  Vidya Silveira PA-C

## 2024-03-19 NOTE — TELEPHONE ENCOUNTER
"CC: Patient calling reporting another fall     (See recent triage encounter on 3/8/24 regarding fall/syncopal episode) patient declined to be evaluated at that time     Falls and Falling:     MECHANISM: \"I don't know I just fell again\" was at daughters house walking through laundry room and just fell   DOMESTIC VIOLENCE/ELDER ABUSE SCREENING: no one with patient at time at fall - patient yelled for help and son helped him up   ONSET: yesterday   LOCATION: hit head on dryer - hit R elbow   INJURY: has multiple skin tears to elbow R elbow   PAIN: elbow is painful - \"I can't rest on my elbow\"   SIZE: denies any bruises - does have skin tears on elbows   OTHER SYMPTOMS: \"I am real weak and shaky\" - still able to walk with walker - states the weakness has been ongoing for a while   CAUSE: \"I don't remember how I fall\" - did not trip etc     Triaged per [facility] protocol, patient to be seen in ED/[facility] (or to office with PCP approval).    Patient states he does not want to be seen in emergency room due to wait times. Would like to be seen in clinic for appointment with PCP.    Routing to Dr. Dozier to please review and advise if team appointment would be appropriate for patient or if other recommendations? Please review and advise - thank you!     Callback [phone] - ok to leave detailed VM     Ana Gong RN  [facility]    Reason for Disposition   Patient sounds very sick or weak to the triager    Additional Information   Negative: Major injury from dangerous force (e.g., fall > 10 feet or 3 meters)   Negative: Major bleeding (e.g., actively dripping or spurting) and can't be stopped   Negative: Shock suspected (e.g., cold/pale/clammy skin, too weak to stand)   Negative: Difficult to awaken or acting confused (e.g., disoriented, slurred speech)   Negative: SEVERE weakness (i.e., unable to walk or barely able to walk, requires support) and new-onset or worsening   Negative: Can't stand (bear " weight) or walk and new-onset after fall   Negative: Sounds like a life-threatening emergency to the triager   Negative: Fainted (passed out)   Negative: New-onset or worsening weakness of the face, arm or leg on one side of the body   Negative: New-onset or worsening dizziness and described as spinning or off balance (i.e., vertigo)   Negative: New-onset or worsening dizziness and NO spinning sensation or trouble with balance   Negative: Pregnant and fall   Negative: Patient has a concerning injury to a specific part of the body (e.g., chest, leg, head)   Negative: Patient has a wound (abrasion, cut, puncture, other skin injury or tear)   Negative: Injury (or injuries) that need emergency care   Negative: Sounds like a serious injury to the triager   Negative: Muscle pain and dark (cola colored) or red-colored urine   Negative: Unable to get up until help (e.g., caregiver, family, friend) arrived and on the ground 1 hour or more    Protocols used: Falls and [id number]

## 2024-03-19 NOTE — RESULT ENCOUNTER NOTE
Leonel Terry,     Here are your elbow x-rays results which are negative for fracture.     Please let us know if you have any questions or concerns.    Regards,  Vidya Silveira PA-C

## 2024-03-19 NOTE — PROGRESS NOTES
Assessment and Plan:     (W19.XXXA) Fall, initial encounter  (primary encounter diagnosis)  Comment: last night, cannot recall how he fell, denies prodrome of dizziness or chest pain, hit right side of parietal scalp on dryer, he does not know if he had LOC, no headache currently, on asa, not on blood thinners, neuro intact  Plan: XR Shoulder Right G/E 3 Views, XR Elbow Right         G/E 3 Views, XR Chest 2 Views            (S09.90XA) Closed head injury, initial encounter  Comment: see above, hit head on dryer last night, unsure if LOC, remembers events before and after, denies headache, nausea/vomiting, visual changes, neck pain, neuro intact on asa 81mg bid  Plan: since it has been >12 hours and no headache, vomiting or focal neuro findings I don't think neuroimaging warranted, discussed strict return precautions    (S50.01XA) Contusion of right elbow, initial encounter  Comment: pain and swelling over lateral portion  Plan: tylenol prn XR today    (J44.1) COPD with exacerbation (H)  Comment:  feels more sob x last 24 hours, denies chest pain, fever/chills, new cough, sounds a bit diminished today with exp wheeze  Plan: predniSONE (DELTASONE) 20 MG tablet, continue nebs and daily prednisone in addition to burst         Sees pulm on 3/29/24  Discussed reasons to be seen promptly       FRED [facility] Same Day Provider   30 minutes on the day of the encounter doing chart review, history and exam, documentation and further activities as noted above.          Artur Terry is a 78 year old, presenting for the following health issues:  Fall (Fell last night hit head on dryer unsure if he was unconscious ) and Pain (Pain in right elbow and rt shoulder)    Pain      Harrison is here for a fall  He fell last night at about 6:30  He does not recall how he fell  He didn't trip on anything that he can recall  He had a fall a few weeks ago when he got up to use the bathroom  He denies any associated  "dizziness, chest pain or sob  He hit his right head on a dryer when he fell  He is unsure if he had LOC  He also hit his right elbow and shoulder when he fell  He denies headaches, nausea/vomiting, vision changes  He did not his chest or abdomen   He takes an asa 81mg twice daily  He is not on blood thinners   He was not drinking alcohol   He is currently in PT and is working on strength and balance     He also notes increased sob since last night  He did not hit his chest when he fell  He has been using nebs which help   He denies chest pain, palpitations, fever/chills, URI symptoms, leg swelling     He is feeling a bit anxious and depressed and is tearful at times  He notes the falls and his spouse leaving him recently have contributed to his anxiety and depression  He denies SI or HI  His children are supportive of him, he sees them regularly         Objective    /82 (BP Location: Right arm, Patient Position: Sitting, Cuff Size: Adult Large)   Pulse 88   Temp 97.8  F (36.6  C) (Oral)   Resp 26   Ht 1.778 m (5' 10\")   Wt 81.2 kg (179 lb)   SpO2 94%   BMI 25.68 kg/m    Body mass index is 25.68 kg/m .    Physical Exam     GENERAL: in NAD, NC/AT  ENT: no bruising or outward signs of trauma on head or neck  Head is non-tender  C spine is nontender and supple  TMs normal, no battles sign  RESP: mild-moderately diminished bilat, mild exp wheeze right base, normal WOB  CV: regular rates and rhythm, normal S1 S2, no S3 or S4 and no murmur, no click or rub   MS: extremities- no gross deformities noted, trace edema bilat lowerext  Right elbow with mild swelling, and small skin tear at superior portion, about size of dime, no bleeding, full passive and active ROM  Right shoulder with mild tenderness posteriorly, no swelling, has trouble flexing past 90 degrees       Signed Electronically by: Vidya Silveira PA-C    "

## 2024-03-19 NOTE — RESULT ENCOUNTER NOTE
Leonel Terry,     Here are your chest x-ray results which are negative for acute findings.     Please let us know if you have any questions or concerns.    Regards,  Vidya Silveira PA-C

## 2024-03-20 NOTE — TELEPHONE ENCOUNTER
Home Care is calling regarding an established patient with [facility].        1/19/2024     1:54 PM   Home Care Information   Date of Home Care episode start 1/22/2024   Current following provider Dr. Dozier   Date provider agreed to follow 1/22/2024    Name/Phone Number Haydeeor ROGERS,[phone]   Home Care agency [facility]     Requesting orders from: Yaneli Dozier  Provider is following patient: Yes  Is this a 60-day recertification request?  Yes    Orders Requested    Physical Therapy  Request for recertification   Frequency:  1x/wk for 3 wks, then 1x/every other week for 5 weeks     Pt had a fall recently (was seen for OV yesterday), and they are also requesting the following wound care orders for the pt's elbow:  Clean with warm soapy water, pat dry, and cover with bandage if needed.     Information was gathered and will be sent to provider for review.  RN will contact Home Care with information after provider review.  Confirmed ok to leave a detailed message with call back.  Contact information confirmed and updated as needed.    Rebecca Salmeron, RN

## 2024-03-29 DIAGNOSIS — G89.29 CHRONIC RIGHT-SIDED LOW BACK PAIN WITH RIGHT-SIDED SCIATICA: ICD-10-CM

## 2024-03-29 DIAGNOSIS — M54.50 ACUTE RIGHT-SIDED LOW BACK PAIN WITHOUT SCIATICA: ICD-10-CM

## 2024-03-29 DIAGNOSIS — M54.41 CHRONIC RIGHT-SIDED LOW BACK PAIN WITH RIGHT-SIDED SCIATICA: ICD-10-CM

## 2024-03-29 RX ORDER — OXYCODONE HYDROCHLORIDE 5 MG/1
5 TABLET ORAL EVERY 8 HOURS PRN
Qty: 90 TABLET | Refills: 0 | Status: ON HOLD | OUTPATIENT
Start: 2024-03-29 | End: 2024-04-28

## 2024-04-07 ENCOUNTER — ANCILLARY PROCEDURE (OUTPATIENT)
Dept: GENERAL RADIOLOGY | Facility: CLINIC | Age: 79
End: 2024-04-07
Attending: NURSE PRACTITIONER
Payer: MEDICARE

## 2024-04-07 ENCOUNTER — NURSE TRIAGE (OUTPATIENT)
Dept: NURSING | Facility: CLINIC | Age: 79
End: 2024-04-07
Payer: MEDICARE

## 2024-04-07 ENCOUNTER — OFFICE VISIT (OUTPATIENT)
Dept: URGENT CARE | Facility: URGENT CARE | Age: 79
End: 2024-04-07
Payer: MEDICARE

## 2024-04-07 VITALS
OXYGEN SATURATION: 96 % | RESPIRATION RATE: 22 BRPM | HEART RATE: 74 BPM | BODY MASS INDEX: 24.77 KG/M2 | SYSTOLIC BLOOD PRESSURE: 167 MMHG | TEMPERATURE: 97.1 F | DIASTOLIC BLOOD PRESSURE: 83 MMHG | WEIGHT: 173 LBS | HEIGHT: 70 IN

## 2024-04-07 DIAGNOSIS — M25.521 RIGHT ELBOW PAIN: ICD-10-CM
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
## 2024-04-07 DIAGNOSIS — M71.121 INFECTED OLECRANON BURSA, RIGHT: Primary | ICD-10-CM

## 2024-04-07 DIAGNOSIS — M25.421 EFFUSION OF BURSA OF RIGHT ELBOW: ICD-10-CM

## 2024-04-07 DIAGNOSIS — I10 ESSENTIAL HYPERTENSION, BENIGN: ICD-10-CM

## 2024-04-07 DIAGNOSIS — D84.9 IMMUNODEFICIENCY (H): ICD-10-CM

## 2024-04-07 PROCEDURE — 87070 CULTURE OTHR SPECIMN AEROBIC: CPT | Performed by: NURSE PRACTITIONER

## 2024-04-07 PROCEDURE — 73070 X-RAY EXAM OF ELBOW: CPT | Mod: TC | Performed by: RADIOLOGY

## 2024-04-07 PROCEDURE — 20605 DRAIN/INJ JOINT/BURSA W/O US: CPT | Performed by: NURSE PRACTITIONER

## 2024-04-07 PROCEDURE — 87077 CULTURE AEROBIC IDENTIFY: CPT | Performed by: NURSE PRACTITIONER

## 2024-04-07 PROCEDURE — 99214 OFFICE O/P EST MOD 30 MIN: CPT | Mod: 25 | Performed by: NURSE PRACTITIONER

## 2024-04-07 PROCEDURE — 96372 THER/PROPH/DIAG INJ SC/IM: CPT | Mod: 59 | Performed by: NURSE PRACTITIONER

## 2024-04-07 PROCEDURE — 87186 SC STD MICRODIL/AGAR DIL: CPT | Performed by: NURSE PRACTITIONER

## 2024-04-07 RX ORDER — CEFTRIAXONE SODIUM 1 G
1 VIAL (EA) INJECTION ONCE
Status: COMPLETED | OUTPATIENT
Start: 2024-04-07 | End: 2024-04-07

## 2024-04-07 RX ORDER — CEPHALEXIN 500 MG/1
500 CAPSULE ORAL 3 TIMES DAILY
Qty: 21 CAPSULE | Refills: 0 | Status: SHIPPED | OUTPATIENT
Start: 2024-04-07 | End: 2024-04-26

## 2024-04-07 RX ADMIN — Medication 1 G: at 13:02

## 2024-04-07 NOTE — PATIENT INSTRUCTIONS
Start oral antibiotics tomorrow morning.    If symptoms worsen go to the Emergency Room.    Keep the initial dressing on for 24 hours then you may remove it.

## 2024-04-07 NOTE — PROGRESS NOTES
Chief Complaint   Patient presents with    Fall     Right elbow swollen             ICD-10-CM    1. Infected olecranon bursa, right  M71.121 cefTRIAXone (ROCEPHIN) in lidocaine 1% (PF) for IM administration 1 g     Aspirate Aerobic Bacterial Culture Routine Without Gram Stain     DRAIN/INJECT MEDIUM JOINT/BURSA     cephALEXin (KEFLEX) 500 MG capsule      2. Essential hypertension, benign  I10       3. Fall, initial encounter  W19.XXXA XR Elbow Right 2 Views      4. Effusion of bursa of right elbow  M25.421 DRAIN/INJECT MEDIUM JOINT/BURSA      5. Right elbow pain  M25.521 XR Elbow Right 2 Views     cefTRIAXone (ROCEPHIN) in lidocaine 1% (PF) for IM administration 1 g      6. Immunodeficiency (H24)  D84.9       Patient is immunocompromised due to his treatment for rheumatoid arthritis.  Patient was given ceftriaxone injection here and did not have any type of allergic reaction 30 minutes later.  He was sent home with Rx for cephalexin which she will start tomorrow morning.  Aspirate from elbow was sent for culture.    He does have a history of hypertension and his blood pressure is elevated today.  He did take all medications as prescribed this morning.  Recommend patient recheck blood pressure at home to be sure it is coming down.  If it is remaining elevated he will keep a log of this discussed with primary care provider the next time he is seen.    Neurologic exam is normal and there is no evidence of trauma to his head.    Red flag warning signs and when to go to the emergency room discussed.  Reviewed potential adverse reactions to medications.    Xray - Reviewed and interpreted by me.  Right elbow x-ray shows no acute fractures or dislocations, there is some degeneration.    No results found. However, due to the size of the patient record, not all encounters were searched. Please check Results Review for a complete set of results.    Subjective     Harrison Thomas is an 78 year old male who presents to clinic  "today for pain in the right elbow since falling about a week ago.  He was getting up from his couch and when he started to walk away he tripped on the carpet and fell onto his elbow.  He denies hitting his head.  His right fifth finger has also been a little bit sore but he denies other injuries.  The elbow has been getting more swollen and red for the last several days.    He denies any fevers.  He is taking all medications as prescribed.      Objective    BP (!) 167/83 (BP Location: Right arm, Patient Position: Sitting, Cuff Size: Adult Regular)   Pulse 74   Temp 97.1  F (36.2  C) (Tympanic)   Resp 22   Ht 1.778 m (5' 10\")   Wt 78.5 kg (173 lb)   SpO2 96%   BMI 24.82 kg/m    Nurses notes and VS have been reviewed.    Physical Exam     GENERAL APPEARANCE: alert and mild distress     MS: extremities normal- no gross deformities noted; normal muscle tone, except for the right elbow which shows a large red hot swollen joint.     SKIN: Multiple areas of ecchymosis on exposed skin of arms and varying stages of healing, right elbow has large effusion with a 8 mm diameter scab in the center     NEURO: Normal strength and tone, mentation intact and speech normal, gait is unsteady    Discussed risks and benefits of aspirating the joint with patient and he agreed to proceed.  Area was cleansed with alcohol then anesthetized with 1% lidocaine with epinephrine.  Good anesthesia was achieved.  Using sterile procedure 18-gauge needle was used to aspirate fluid.  Total of 24 mL of cloudy pink/yellow fluid removed.  Pressure was applied until hemostasis was achieved then sterile dressing was applied.  Aspirate was sent for examination.  Patient tolerated procedure well without any complications.    PREETI Boykin, CNP  [facility] Provider    The use of Dragon/Buyou dictation services may have been used to construct the content in this note; any grammatical or spelling errors are non-intentional. Please " contact the author of this note directly if you are in need of any clarification.

## 2024-04-07 NOTE — TELEPHONE ENCOUNTER
Pt calling with concerns about;    Fall 2 days ago while trying to get up from couch at daughter's house  Right elbow is very swollen, painful, throbbing 6/10  Very red, has a small scrape in the skin on elbow like dime or nickel size  Is able to move right arm normally    Denies;  Elbow/arm looks crooked or deformed  Excruciating pain   Major bleeding that can't be stopped    According to the protocol, patient should see a HCP within 24 hours.  Care advice given/when to call back.   Patient verbalizes understanding and agrees with plan of care, stating, I'm going to go to  in [place] today.'    Nancy Dill RN, Nurse Advisor 12:12 PM 4/7/2024  Reason for Disposition   Large swelling or bruise (> 2 inches or 5 cm)    Additional Information   Negative: Serious injury with multiple fractures (broken bones)   Negative: [1] Major bleeding (e.g., actively dripping or spurting) AND [2] can't be stopped   Negative: Bullet wound, stabbed by knife, or other serious penetrating wound   Negative: Sounds like a life-threatening emergency to the triager   Negative: Wound looks infected   Negative: Elbow pain from overuse (work, exercise, gardening) OR from self-induced lifting injury   Negative: Elbow pain not from an injury   Negative: Looks like a broken bone or dislocated joint (e.g., crooked or deformed)   Negative: Skin is split open or gaping (or length > 1/2 inch or 12 mm)   Negative: [1] Bleeding AND [2] won't stop after 10 minutes of direct pressure (using correct technique)   Negative: [1] Dirt in the wound AND [2] not removed with 15 minutes of scrubbing   Negative: Can't bend injured elbow at all   Negative: [1] Numbness (i.e., loss of sensation) in fingers AND [2] present now   Negative: Sounds like a serious injury to the triager   Negative: [1] SEVERE pain AND [2] not improved 2 hours after pain medicine/ice packs   Negative: Can't move injured elbow normally (i.e., bend or straighten completely)   Negative:  Suspicious history for the injury    Protocols used: Elbow Injury-A-AH

## 2024-04-07 NOTE — PROGRESS NOTES
Clinic Administered Medication Documentation        Patient was given Rocephin. Prior to medication administration, verified patient's identity using patient s name and date of birth. Please see MAR and medication order for additional information. Patient instructed to remain in clinic for 15 minutes and report any adverse reaction to staff immediately.    Vial/Syringe: Single dose vial. Was entire vial of medication used? Yes    SAÚL Scott, Medical Assistant

## 2024-04-09 ENCOUNTER — TELEPHONE (OUTPATIENT)
Dept: URGENT CARE | Facility: URGENT CARE | Age: 79
End: 2024-04-09

## 2024-04-09 NOTE — TELEPHONE ENCOUNTER
DATE/TIME OF CALL RECEIVED FROM LAB:  04/09/24 at 11:02 AM   LAB TEST:  Synovial fluid Aerobic Bacterial Culture Routine Without Gram Stain   LAB VALUE:  1+ Staphylococcus lugdunensis  PROVIDER NOTIFIED?: Yes  PROVIDER NAME: [facility]  DATE/TIME LAB VALUE REPORTED TO PROVIDER: 4/9/24, 11:04 am  Carly Keating, RN, BSN, PHN  [facility]  Nurse Triage, Family Practice

## 2024-04-10 ENCOUNTER — OFFICE VISIT (OUTPATIENT)
Dept: URGENT CARE | Facility: URGENT CARE | Age: 79
End: 2024-04-10
Payer: MEDICARE

## 2024-04-10 VITALS
SYSTOLIC BLOOD PRESSURE: 144 MMHG | DIASTOLIC BLOOD PRESSURE: 84 MMHG | TEMPERATURE: 98.4 F | RESPIRATION RATE: 28 BRPM | OXYGEN SATURATION: 97 % | HEART RATE: 72 BPM

## 2024-04-10 DIAGNOSIS — D84.9 IMMUNOSUPPRESSION (H): ICD-10-CM

## 2024-04-10 DIAGNOSIS — M71.121 INFECTION OF RIGHT OLECRANON BURSA: Primary | ICD-10-CM

## 2024-04-10 LAB — BACTERIA SNV CULT: ABNORMAL

## 2024-04-10 PROCEDURE — 99214 OFFICE O/P EST MOD 30 MIN: CPT | Performed by: STUDENT IN AN ORGANIZED HEALTH CARE EDUCATION/TRAINING PROGRAM

## 2024-04-10 RX ORDER — DOXYCYCLINE 100 MG/1
100 CAPSULE ORAL 2 TIMES DAILY
Qty: 20 CAPSULE | Refills: 0 | Status: SHIPPED | OUTPATIENT
Start: 2024-04-10 | End: 2024-04-17

## 2024-04-10 NOTE — PROGRESS NOTES
Assessment & Plan     Infection of right olecranon bursa  Patient was seen 3 days ago for infected olecranon bursa. He was given an injection of Rocephin and started on cephalexin. He denies fever or chills. The bursa is swollen and pinkish erythema without spreading of discoloration beyond the bursa. He stopped wearing the ACE wrap for compression because it kept sliding down. I emphasized the importance of compression to improve the swelling and suggested he look for a compression sleeve or find a large compression sock and cut off the toe portion so he can slip it onto his arm to provide compression and also to elevate. His culture grew out staph lugdunensis, awaiting sensitivities. I reviewed the research on this particular bacteria and some of the research suggested poor response to cephalosporins or penicillins and switching to doxycycline or bactrim may be wise. Patient is immunosuppressed making him higher risk for hospitalization. I advised stopping cephalexin and starting doxycycline while awaiting sensitivity report. Advised that he go directly to ER if he develops fever, chills or any other worsening of symptoms. Patient agrees with plan.   - doxycycline hyclate (VIBRAMYCIN) 100 MG capsule  Dispense: 20 capsule; Refill: 0    Immunosuppression (H24)  See above notes  - doxycycline hyclate (VIBRAMYCIN) 100 MG capsule  Dispense: 20 capsule; Refill: 0     30 minutes spent by me on the date of the encounter doing chart review, review of test results, interpretation of tests, patient visit, and documentation         No follow-ups on file.    PREETI Guerrero [facility] [place]    Artur Terry is a 78 year old male who presents to clinic today for the following health issues:  Chief Complaint   Patient presents with    Musculoskeletal Problem     Fell on right elbow on Friday- had it drained on Sunday. Swelling up again and is hot. Taking antibiotics       HPI      Patient Active Problem List   Diagnosis    Essential hypertension, benign    Pain in joint, upper arm    Allergic rhinitis    Pain in joint, lower leg    Chronic airway obstruction (H)    Monoclonal paraproteinemia    Immunodeficiency (H24)    Eczema    Transient cerebral ischemia    Bunion    Occipital neuralgia    Hyperlipidemia LDL goal <100    Health Care Home    Low back pain    Olecranon bursitis of right elbow    Bruit    Retina hole    signed & scanned on 09/23/2011  (1-4-2013 printed but not scanned in)     Chronic, continuous use of opioids    Atherosclerosis of native coronary artery of native heart without angina pectoris    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, unspecified thyrotoxicosis type    Right-sided low back pain with right-sided sciatica    Coronary artery disease involving native coronary artery of native heart without angina pectoris    BRENNEN (obstructive sleep apnea)    Hammer toe of right foot    Hallux limitus of right foot    Corn of toe    Non-recurrent unilateral inguinal hernia with obstruction without gangrene    Left inguinal hernia    Metastasis from thyroid cancer (H)    Pulmonary nodules    Preoperative examination    Chronic pain disorder    Dehydration    Shock (H)    CALLUM (acute kidney injury) (H24)    COPD, very severe (H)    Hypokalemia    Hypoxia    Spleen hematoma, initial encounter    Fall, initial encounter    SAH (subarachnoid hemorrhage) (H)    Malignant neoplasm metastatic to lung, unspecified laterality (H)    Hospital-acquired pneumonia    Radiation pneumonitis (H24)    Physical deconditioning     Current Outpatient Medications   Medication Sig Dispense Refill    acetaminophen (TYLENOL) 500 MG tablet Take 1,000 mg by mouth every 8 hours as needed      acetylcysteine (MUCOMYST) 20 % neb solution Take 2 mLs by nebulization 4 times daily      albuterol (ACCUNEB) 1.25 MG/3ML neb solution Take 1 vial (1.25 mg) by nebulization every 4 hours as needed for  shortness of breath or wheezing 90 mL 4    albuterol (PROAIR HFA/PROVENTIL HFA/VENTOLIN HFA) 108 (90 Base) MCG/ACT inhaler INHALE 2 PUFFS BY MOUTH EVERY 6 HOURS AS NEEDED FOR WHEEZE OR FOR SHORTNESS OF BREATH 18 g 3    alendronate (FOSAMAX) 70 MG tablet Take 1 tablet (70 mg) by mouth every 7 days 12 tablet 3    amLODIPine (NORVASC) 5 MG tablet Take 1 tablet (5 mg) by mouth at bedtime 90 tablet 3    ascorbic acid 1000 MG TABS tablet Take 1,000 mg by mouth daily      aspirin 81 MG EC tablet Take 81 mg by mouth 2 times daily      calcium carbonate (OS-KARLIE) 1500 (600 Ca) MG tablet Take 600 mg by mouth daily      carboxymethylcellulose PF (REFRESH PLUS) 0.5 % ophthalmic solution Place 1 drop into both eyes every hour as needed for dry eyes      cephALEXin (KEFLEX) 500 MG capsule Take 1 capsule (500 mg) by mouth 3 times daily 21 capsule 0    cholecalciferol 25 MCG (1000 UT) TABS Take 1,000 Units by mouth daily       cyclobenzaprine (FLEXERIL) 5 MG tablet Take 1 tablet (5 mg) by mouth nightly as needed for muscle spasms 21 tablet 1    doxycycline hyclate (VIBRAMYCIN) 100 MG capsule Take 1 capsule (100 mg) by mouth 2 times daily for 10 days 20 capsule 0    DULoxetine (CYMBALTA) 20 MG capsule Take 1 capsule (20 mg) by mouth 2 times daily 180 capsule 3    Fluticasone-Umeclidin-Vilant (TRELEGY ELLIPTA) 100-62.5-25 MCG/ACT oral inhaler Inhale 1 puff into the lungs daily 90 each 3    furosemide (LASIX) 20 MG tablet Take 20-40 mg by mouth every other day Give 40 mg by mouth  every other day for COPD   AND  Give 20 mg by mouth very other day  HTN,      gabapentin (NEURONTIN) 400 MG capsule Take 1 capsule (400 mg) by mouth 3 times daily      hydrocortisone 2.5 % cream Apply topically 2 times daily as needed for itching      ipratropium - albuterol 0.5 mg/2.5 mg/3 mL (DUONEB) 0.5-2.5 (3) MG/3ML neb solution Take 1 vial (3 mLs) by nebulization 4 times daily      KLOR-CON 20 MEQ CR tablet TAKE 1 TABLET BY MOUTH 2 TIMES DAILY 180  tablet 3    levalbuterol (XOPENEX) 0.31 MG/3ML neb solution Inhale 0.31 mg into the lungs      levothyroxine (SYNTHROID/LEVOTHROID) 125 MCG tablet Take 1 tablet (125 mcg) by mouth daily 90 tablet 3    MegaRed Omega-3 Krill Oil 500 MG CAPS Take 500 mg by mouth daily      Melatonin 10 MG TABS tablet Take 10 mg by mouth At Bedtime      methimazole (TAPAZOLE) 5 MG tablet       methotrexate 2.5 MG tablet       metoprolol succinate ER (TOPROL XL) 50 MG 24 hr tablet Take 1 tablet (50 mg) by mouth every evening      multivitamin w/minerals (THERA-VIT-M) tablet Take 1 tablet by mouth daily       nitroGLYcerin (NITROSTAT) 0.4 MG sublingual tablet FOR CHEST PAIN PLACE 1 TAB UNDER TONGUE EVERY 5 MIN FOR 3 DOSES. IF SYMPTOMS PERSIST 5 MIN AFTER 1ST DOSE CALL 911 25 tablet 3    oxyCODONE (ROXICODONE) 5 MG tablet Take 1 tablet (5 mg) by mouth every 8 hours as needed for severe pain 90 tablet 0    pantoprazole (PROTONIX) 40 MG EC tablet Take 1 tablet (40 mg) by mouth every morning (before breakfast) 90 tablet 1    polyethylene glycol (MIRALAX) 17 GM/Dose powder Take 17 g by mouth daily      predniSONE (DELTASONE) 1 MG tablet Take 2 tablets (2 mg) by mouth daily For COPD 60 tablet 4    predniSONE (DELTASONE) 20 MG tablet Take 2 tablets (40 mg) by mouth daily 10 tablet 0    roflumilast (DALIRESP) 500 MCG TABS tablet       rosuvastatin (CRESTOR) 10 MG tablet TAKE 1 TABLET BY MOUTH EVERY DAY 90 tablet 3    senna-docusate (SENOKOT-S/PERICOLACE) 8.6-50 MG tablet Take 1 tablet by mouth 2 times daily as needed for constipation      tamsulosin (FLOMAX) 0.4 MG capsule Take 1 capsule (0.4 mg) by mouth daily      traZODone (DESYREL) 50 MG tablet Take 50 mg by mouth at bedtime      amoxicillin-clavulanate (AUGMENTIN) 875-125 MG tablet Take 1 tablet by mouth (Patient not taking: Reported on 4/10/2024)       No current facility-administered medications for this visit.         Review of Systems  Constitutional, HEENT, cardiovascular, pulmonary,  GI, , musculoskeletal, neuro, skin, endocrine and psych systems are negative, except as otherwise noted.      Objective    BP (!) 144/84   Pulse 72   Temp 98.4  F (36.9  C) (Oral)   Resp 28   SpO2 97%   Physical Exam   GENERAL: alert and no distress  MS: right olecranon bursa is swollen and has pinkish erythema without spreading of discoloration beyond the bursa, mild warmth and tenderness to palpation, no red streaking up the arm  SKIN: no suspicious lesions or rashes  NEURO: Normal strength and tone, mentation intact and speech normal  PSYCH: mentation appears normal, affect normal/bright    Collected 4/7/2024  1:20 PM       Status: Preliminary result       Visible to patient: No (not released)       Dx: Infected olecranon bursa, right    Specimen Information: Elbow, Right; Synovial fluid   1 Result Note  Culture 1+ Staphylococcus lugdunensis Abnormal            Resulting Agency: [facility]

## 2024-04-11 ENCOUNTER — TELEPHONE (OUTPATIENT)
Dept: FAMILY MEDICINE | Facility: CLINIC | Age: 79
End: 2024-04-11
Payer: MEDICARE

## 2024-04-11 NOTE — TELEPHONE ENCOUNTER
Home Care is calling regarding an established patient with [facility].        1/19/2024     1:54 PM   Home Care Information   Date of Home Care episode start 1/22/2024   Current following provider Dr. Dozier   Date provider agreed to follow 1/22/2024    Name/Phone Number Haydeeor ROGERS,[phone]   Home Care agency [facility]     Requesting orders from: Yaneli Dozier  Provider is following patient: Yes  Is this a 60-day recertification request?  No    Orders Requested    Skilled Nursing  Request for initial evaluation and treatment (one time)   Verbal approval given per protocol.     Confirmed ok to leave a detailed message with call back.  Contact information confirmed and updated as needed.    Lenore Chang RN

## 2024-04-12 ENCOUNTER — OFFICE VISIT (OUTPATIENT)
Dept: PULMONOLOGY | Facility: CLINIC | Age: 79
End: 2024-04-12
Attending: INTERNAL MEDICINE
Payer: MEDICARE

## 2024-04-12 ENCOUNTER — ANCILLARY PROCEDURE (OUTPATIENT)
Dept: CT IMAGING | Facility: CLINIC | Age: 79
End: 2024-04-12
Attending: INTERNAL MEDICINE
Payer: MEDICARE

## 2024-04-12 VITALS — OXYGEN SATURATION: 93 % | HEART RATE: 59 BPM | DIASTOLIC BLOOD PRESSURE: 85 MMHG | SYSTOLIC BLOOD PRESSURE: 145 MMHG

## 2024-04-12 DIAGNOSIS — R91.8 MASS OF UPPER LOBE OF RIGHT LUNG: ICD-10-CM

## 2024-04-12 DIAGNOSIS — J70.0 RADIATION PNEUMONITIS (H): ICD-10-CM

## 2024-04-12 DIAGNOSIS — J18.9 PNEUMONIA DUE TO INFECTIOUS ORGANISM, UNSPECIFIED LATERALITY, UNSPECIFIED PART OF LUNG: ICD-10-CM

## 2024-04-12 DIAGNOSIS — G47.33 OBSTRUCTIVE SLEEP APNEA: ICD-10-CM

## 2024-04-12 DIAGNOSIS — J42 CHRONIC BRONCHITIS, UNSPECIFIED CHRONIC BRONCHITIS TYPE (H): ICD-10-CM

## 2024-04-12 DIAGNOSIS — J43.8 OTHER EMPHYSEMA (H): ICD-10-CM

## 2024-04-12 DIAGNOSIS — R93.89 ABNORMAL CHEST CT: ICD-10-CM

## 2024-04-12 DIAGNOSIS — J43.8 OTHER EMPHYSEMA (H): Primary | ICD-10-CM

## 2024-04-12 DIAGNOSIS — J42 CHRONIC BRONCHITIS, UNSPECIFIED CHRONIC BRONCHITIS TYPE (H): Primary | ICD-10-CM

## 2024-04-12 DIAGNOSIS — J96.11 CHRONIC RESPIRATORY FAILURE WITH HYPOXIA (H): ICD-10-CM

## 2024-04-12 LAB
6 MIN WALK (FT): 1000 FT
6 MIN WALK (M): 305 M

## 2024-04-12 PROCEDURE — 99215 OFFICE O/P EST HI 40 MIN: CPT | Performed by: INTERNAL MEDICINE

## 2024-04-12 PROCEDURE — G1010 CDSM STANSON: HCPCS | Mod: GC | Performed by: RADIOLOGY

## 2024-04-12 PROCEDURE — 71250 CT THORAX DX C-: CPT | Mod: MG | Performed by: RADIOLOGY

## 2024-04-12 PROCEDURE — 94618 PULMONARY STRESS TESTING: CPT | Performed by: INTERNAL MEDICINE

## 2024-04-12 PROCEDURE — G0463 HOSPITAL OUTPT CLINIC VISIT: HCPCS | Performed by: INTERNAL MEDICINE

## 2024-04-12 RX ORDER — FLUTICASONE FUROATE, UMECLIDINIUM BROMIDE AND VILANTEROL TRIFENATATE 200; 62.5; 25 UG/1; UG/1; UG/1
1 POWDER RESPIRATORY (INHALATION) DAILY
Qty: 60 EACH | Refills: 11 | Status: SHIPPED | OUTPATIENT
Start: 2024-04-12 | End: 2024-07-15

## 2024-04-12 RX ORDER — FLUTICASONE FUROATE, UMECLIDINIUM BROMIDE AND VILANTEROL TRIFENATATE 200; 62.5; 25 UG/1; UG/1; UG/1
1 POWDER RESPIRATORY (INHALATION) DAILY
Qty: 1 EACH | Refills: 11 | Status: SHIPPED | OUTPATIENT
Start: 2024-04-12 | End: 2024-04-12

## 2024-04-12 ASSESSMENT — PAIN SCALES - GENERAL: PAINLEVEL: NO PAIN (0)

## 2024-04-12 NOTE — LETTER
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 with old infarct. Stents/coronary calcifications.  Lymph nodes: No enlarged mediastinal/hilar lymph nodes.  Thyroid: Not well seen and may be absent or atrophic.  Esophagus: Within normal limits     Upper abdomen: Evaluation of the upper abdomen is limited and without  contrast. Partial hepatectomy.     Bones and soft tissues: No suspicious axillary lymphadenopathy or soft  tissue mass. No suspicious osseous lesion. T8 osteopenic compression  deformity with anterior wedging stable dating back January 2023.                                                                   Impression:   1. 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  No evidence for recurrence.  2. Decreased appearance of nodular opacities in the lower lobes with  areas of scarring.  Likely decreasing infection/COPD exacerbation or  sequale of radiation.  3. Otherwise unchanged scattered discrete pulmonary nodules.  4. Pulmonary emphysema.     I have personally reviewed the examination and initial interpretation  and I agree with the findings.      DAVID RUIZ MD

## 2024-04-12 NOTE — NURSING NOTE
Chief Complaint   Patient presents with    Follow Up     COPD Follow up      Vitals were taken and medications were reconciled.     Arina Sesay RMA  9:27 AM

## 2024-04-12 NOTE — PROGRESS NOTES
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 pulmonary  nodules are unchanged including a dominant right lower lobe solid  nodule measuring 5 mm on series 4 image 171. Lower lobe predominant  findings including peripheral nodular and micronodular opacities with  superimposed groundglass overall decreased from 2/2024.     Airways: Central tracheobronchial tree is clear.  Vessels: Main pulmonary artery and aorta are normal in caliber. Normal  three-vessel arch  Heart: Heart size is normal without pericardial effusion. Again seen  fat attenuation in the inferior/inferolateral left ventricel  consistent with old infarct. Stents/coronary calcifications.  Lymph nodes: No enlarged mediastinal/hilar lymph nodes.  Thyroid: Not well seen and may be absent or atrophic.  Esophagus: Within normal limits     Upper abdomen: Evaluation of the upper abdomen is limited and without  contrast. Partial hepatectomy.     Bones and soft tissues: No suspicious axillary lymphadenopathy or soft  tissue mass. No suspicious osseous lesion. T8 osteopenic compression  deformity with anterior wedging stable dating back January 2023.                                                                    Impression:   1. 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  No evidence for recurrence.  2. Decreased appearance of nodular opacities in the lower lobes with  areas of scarring.  Likely decreasing infection/COPD exacerbation or  sequale of radiation.  3. Otherwise unchanged scattered discrete pulmonary nodules.  4. Pulmonary emphysema.     I have personally reviewed the examination and initial interpretation  and I agree with the findings.     DAVID RUIZ MD

## 2024-04-12 NOTE — PATIENT INSTRUCTIONS
COPD  Lung Mass (CA) s/p RT    Plan  Switch Trelegy from low strength to high strength once current inhalers are used up  Temporily add Arnuity Ellipta 1 inhalation each AM only while on lower strength Trelegy (prescribed)  Apply for [employer] patient assistance program - patients will help  Noncontrast Chest CT to establish new baseline lung status (post prolonged hospitalization Dec - Jan)  Call our lung nurses for problems or questions [phone]  Return to clinic 6 months

## 2024-04-15 ENCOUNTER — TELEPHONE (OUTPATIENT)
Dept: FAMILY MEDICINE | Facility: CLINIC | Age: 79
End: 2024-04-15
Payer: MEDICARE

## 2024-04-15 LAB — FIO2-PRE: 21 %

## 2024-04-15 NOTE — TELEPHONE ENCOUNTER
Butch MCCLOUD with [facility] calling to request verbal orders for home care. See order details below.       Home Care is calling regarding an established patient with  [employer] [facility].        1/19/2024     1:54 PM   Home Care Information   Date of Home Care episode start 1/22/2024   Current following provider Dr. Tasia Mann provider agreed to follow 1/22/2024    Name/Phone Number Isaac MCCLOUD,[phone]   Home Care agency [facility]     Requesting orders from: Yaneli Dozier  Provider is following patient: Yes  Is this a 60-day recertification request?  No    Orders Requested    Skilled Nursing  Request for continuation of care with no increase or decrease in frequency  Frequency: 2x per week for 1 week, 1x per week for 2 weeks.         Verbal orders given.  Home Care will send orders for provider to sign.  Confirmed ok to leave a detailed message with call back.  Contact information confirmed and updated as needed.    Justice L. Phoenix, RN

## 2024-04-16 ENCOUNTER — TELEPHONE (OUTPATIENT)
Dept: PULMONOLOGY | Facility: CLINIC | Age: 79
End: 2024-04-16
Payer: MEDICARE

## 2024-04-16 NOTE — TELEPHONE ENCOUNTER
Patient confirmed scheduled appointment:  Date: 10/11/2024  Time: 10:30AM  Visit type: RPULM  Provider: [place]  Location: [facility]  Testing/imaging: N/A  Additional notes: N/A

## 2024-04-17 ENCOUNTER — OFFICE VISIT (OUTPATIENT)
Dept: URGENT CARE | Facility: URGENT CARE | Age: 79
End: 2024-04-17
Payer: MEDICARE

## 2024-04-17 VITALS
DIASTOLIC BLOOD PRESSURE: 78 MMHG | OXYGEN SATURATION: 93 % | TEMPERATURE: 97.8 F | HEART RATE: 68 BPM | SYSTOLIC BLOOD PRESSURE: 127 MMHG

## 2024-04-17 DIAGNOSIS — S09.90XA CLOSED HEAD INJURY, INITIAL ENCOUNTER: Primary | ICD-10-CM

## 2024-04-17 DIAGNOSIS — M25.552 HIP PAIN, LEFT: ICD-10-CM
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
## 2024-04-17 PROCEDURE — 99207 PR NO CHARGE LOS: CPT | Performed by: PHYSICIAN ASSISTANT

## 2024-04-17 NOTE — PATIENT INSTRUCTIONS
Slipped in driveway last night  Hit head- needs CT  Not able to bear weight without significant pain in left hip, possible broken hip  Needs to be seen in emergency room now  
none

## 2024-04-17 NOTE — PROGRESS NOTES
Assessment & Plan     Closed head injury, initial encounter    Fall, initial encounter    Hip pain, ann Terry was advised to go to the emergency room for further evaluation.  With a closed head injury in a patient who is 65 and older, CT scan is needed for further evaluation.  He does have a history of a subarachnoid hemorrhage.  Suspect fracture of left hip as well. He will go now.    Return today (on 4/17/2024) for go to ER now.     Priscilla Moore PA-C  [facility]    Subjective   Harrison Thomas is a 78 year old who presents for the following health issues     Patient presents with:  Musculoskeletal Problem: Fell in the driveway last night, c/o left hip, low back, left elbow pain. He hit his head. Right elbow pain and swelling from previous fall.      MARY Terry presents clinic today for evaluation of pain in his left hip.  Last night, he fell in his driveway, landing on his left hip.  He is not able to bear weight without excruciating pain.  He also hit his head during his fall.    Review of Systems   ROS negative except as stated above.      Objective    /78   Pulse 68   Temp 97.8  F (36.6  C) (Tympanic)   SpO2 93%   Physical Exam   GENERAL: alert and no distress    No results found for any visits on 04/17/24.

## 2024-04-18 DIAGNOSIS — J44.1 CHRONIC OBSTRUCTIVE PULMONARY DISEASE WITH ACUTE EXACERBATION (H): ICD-10-CM

## 2024-04-18 RX ORDER — GABAPENTIN 400 MG/1
400 CAPSULE ORAL 3 TIMES DAILY
Qty: 270 CAPSULE | Refills: 3 | Status: SHIPPED | OUTPATIENT
Start: 2024-04-18

## 2024-04-18 RX ORDER — FUROSEMIDE 20 MG
20-40 TABLET ORAL EVERY OTHER DAY
Qty: 90 TABLET | Refills: 3 | Status: SHIPPED | OUTPATIENT
Start: 2024-04-18

## 2024-04-22 ENCOUNTER — OFFICE VISIT (OUTPATIENT)
Dept: FAMILY MEDICINE | Facility: CLINIC | Age: 79
End: 2024-04-22
Payer: MEDICARE

## 2024-04-22 VITALS
HEIGHT: 70 IN | OXYGEN SATURATION: 97 % | RESPIRATION RATE: 16 BRPM | SYSTOLIC BLOOD PRESSURE: 147 MMHG | TEMPERATURE: 97.1 F | WEIGHT: 173 LBS | BODY MASS INDEX: 24.77 KG/M2 | HEART RATE: 74 BPM | DIASTOLIC BLOOD PRESSURE: 89 MMHG

## 2024-04-22 DIAGNOSIS — G89.29 CHRONIC RIGHT-SIDED LOW BACK PAIN WITH RIGHT-SIDED SCIATICA: ICD-10-CM

## 2024-04-22 DIAGNOSIS — I25.10 ATHEROSCLEROSIS OF NATIVE CORONARY ARTERY OF NATIVE HEART WITHOUT ANGINA PECTORIS: ICD-10-CM

## 2024-04-22 DIAGNOSIS — E89.0 POSTSURGICAL HYPOTHYROIDISM: ICD-10-CM

## 2024-04-22 DIAGNOSIS — J44.9 CHRONIC OBSTRUCTIVE PULMONARY DISEASE, UNSPECIFIED COPD TYPE (H): Primary | ICD-10-CM

## 2024-04-22 DIAGNOSIS — S79.912D INJURY OF LEFT HIP, SUBSEQUENT ENCOUNTER: ICD-10-CM

## 2024-04-22 DIAGNOSIS — M71.021 OLECRANON BURSA ABSCESS, RIGHT: ICD-10-CM

## 2024-04-22 DIAGNOSIS — M05.79 RHEUMATOID ARTHRITIS INVOLVING MULTIPLE SITES WITH POSITIVE RHEUMATOID FACTOR (H): ICD-10-CM

## 2024-04-22 DIAGNOSIS — M54.41 CHRONIC RIGHT-SIDED LOW BACK PAIN WITH RIGHT-SIDED SCIATICA: ICD-10-CM

## 2024-04-22 LAB
ERYTHROCYTE [DISTWIDTH] IN BLOOD BY AUTOMATED COUNT: 13.1 % (ref 10–15)
HCT VFR BLD AUTO: 45.8 % (ref 40–53)
HGB BLD-MCNC: 14.7 G/DL (ref 13.3–17.7)
MCH RBC QN AUTO: 29.1 PG (ref 26.5–33)
MCHC RBC AUTO-ENTMCNC: 32.1 G/DL (ref 31.5–36.5)
MCV RBC AUTO: 91 FL (ref 78–100)
PLATELET # BLD AUTO: 179 10E3/UL (ref 150–450)
RBC # BLD AUTO: 5.06 10E6/UL (ref 4.4–5.9)
WBC # BLD AUTO: 7.1 10E3/UL (ref 4–11)

## 2024-04-22 PROCEDURE — 99215 OFFICE O/P EST HI 40 MIN: CPT | Performed by: INTERNAL MEDICINE

## 2024-04-22 PROCEDURE — 85027 COMPLETE CBC AUTOMATED: CPT | Performed by: INTERNAL MEDICINE

## 2024-04-22 PROCEDURE — 84439 ASSAY OF FREE THYROXINE: CPT | Performed by: INTERNAL MEDICINE

## 2024-04-22 PROCEDURE — G2211 COMPLEX E/M VISIT ADD ON: HCPCS | Performed by: INTERNAL MEDICINE

## 2024-04-22 PROCEDURE — 99417 PROLNG OP E/M EACH 15 MIN: CPT | Performed by: INTERNAL MEDICINE

## 2024-04-22 PROCEDURE — 86140 C-REACTIVE PROTEIN: CPT | Performed by: INTERNAL MEDICINE

## 2024-04-22 PROCEDURE — 80053 COMPREHEN METABOLIC PANEL: CPT | Performed by: INTERNAL MEDICINE

## 2024-04-22 PROCEDURE — 84443 ASSAY THYROID STIM HORMONE: CPT | Performed by: INTERNAL MEDICINE

## 2024-04-22 PROCEDURE — 36415 COLL VENOUS BLD VENIPUNCTURE: CPT | Performed by: INTERNAL MEDICINE

## 2024-04-22 PROCEDURE — 80061 LIPID PANEL: CPT | Performed by: INTERNAL MEDICINE

## 2024-04-22 ASSESSMENT — PAIN SCALES - GENERAL: PAINLEVEL: EXTREME PAIN (9)

## 2024-04-22 NOTE — PROGRESS NOTES
Assessment & Plan     Chronic obstructive pulmonary disease, unspecified COPD type (H)  The patient has severe COPD and is on maximal medical management and also recent pulmonology note is appreciated  He does not have a flareup and has prednisone and antibiotic on hand  - REVIEW OF HEALTH MAINTENANCE PROTOCOL ORDERS    Injury of left hip, subsequent encounter  Concerning to me because he is in severe pain we actually had to draw blood even in the room  The CT of the femur and consult orthopedics  - CT Femur Thigh Left w/o Contrast  - Orthopedic  Referral    Chronic right-sided low back pain with right-sided sciatica  Patient is on oxycodone and a contract was done    Olecranon bursa abscess, right  This is septic bursitis and he has been on cephalexin for over 10 days which is not going to work on the septic bursa without drainage    - Orthopedic  Referral  - CRP, inflammation    Postsurgical hypothyroidism  And is on methimazole and Synthroid both and I have communicated with endocrinology to get clearance from that  - REVIEW OF HEALTH MAINTENANCE PROTOCOL ORDERS  - T4 free  - TSH    Atherosclerosis of native coronary artery of native heart without angina pectoris  No angina and continue statins and baby aspirin  Continue Toprol and Crestor  - REVIEW OF HEALTH MAINTENANCE PROTOCOL ORDERS  - Lipid panel reflex to direct LDL Fasting  - Comprehensive metabolic panel    Rheumatoid arthritis involving multiple sites with positive rheumatoid factor (H)  No symptoms at present  - REVIEW OF HEALTH MAINTENANCE PROTOCOL ORDERS    55 minutes spent with patient, over 50% time counseling, coordinating care and explaining about nature of the patient's conditions.  It took me half an hour to review all the chart from other organizations    Artur Terry is a 78 year old, presenting for the following health issues: This is a hospital follow-up patient was admitted 2 days to [facility] and discharge  summary is not available  Follow Up  Harrison was admitted after fall again   he was getting out of the car and lost his balance and fell landing on his left hip. His daughter was able to help him into the house but he still having severe pain , He denies hitting his head and has had no headache or neck pain.  Imaging was normal he does have some superficial abrasions and pain in his left elbow.     This pain in the left elbow is going on for 2 to 3 weeks  He was seen in urgent care at [facility] and was given cephalexin  Elbow was not drained    Patient is very complex  He is prior VATS biopsy of the right upper lobe mass did not yield a specific diagnosis but was of high enough suspicion for cancer that he underwent radiation therapy with probable radiation pneumonitis he is 2022 His most recent prior PFTs that showed severe obstruction with a reduced TLC at 74% of predicted and a markedly reduced DLCO at 41% predicted, uncorrected for hemoglobin. On 6-minute walk test he had drop in O2 sat from 99% at rest to 90% after 3 minutes.   endocrinology on 3/7/2024 for his thyroid cancer. That note documents the detailed history regarding this. He had a total thyroidectomy on 8/13/2021 showing multifocal papillary thyroid carcinoma and received radioactive iodine treatment in 9/21. He previously was noted to have a metastatic pulmonary nodule that was papillary thyroid carcinoma. (Per Dr. Simmons's note). Thyroid labs were ordered    Patient continues to have back pain    He has home care  His coronary disease is stable  Rheumatoid arthritis is stable      This note was completed in part using dictation via the Dragon voice recognition software. Some word and grammatical errors may occur and must be interpreted in the appropriate clinical context.  If there are any questions pertaining to this issue, please contact me for further clarification     History of Present Illness       COPD:  He presents for follow up of COPD.    Overall, COPD symptoms are slightly worse since last visit. He has more than usual fatigue or shortness of breath with exertion and same as usual shortness of breath at rest.  He rarely coughs and does not have change in sputum. No recent fever. He can walk the length of 3-5 rooms without stopping to rest. He can walk 1 flights of stairs without resting. The patient has had an ED, urgent care, or hospital admission because of COPD since the last visit. He states he has had 1 visit(s) to an ED, Urgent Care, or Hospital due to his COPD.    He eats 0-1 servings of fruits and vegetables daily.He consumes 0 sweetened beverage(s) daily.He exercises with enough effort to increase his heart rate 9 or less minutes per day.  He exercises with enough effort to increase his heart rate 3 or less days per week.   He is taking medications regularly.           Hospital Follow-up Visit:    Hospital/Nursing Home/IP Rehab Facility:  [facility]   Date of Admission: 4/17/2024  Date of Discharge: Present (per pt's notes)  Reason(s) for Admission:       Contusion of left hip, initial encounter (Primary Dx);  Fall, initial encounter;  Skin tear of left elbow without complication, initial encounter     Was the patient in the ICU or did the patient experience delirium during hospitalization?  No  Do you have any other stressors you would like to discuss with your provider? No    Problems taking medications regularly:  None  Medication changes since discharge: Yes, updated in med list   Problems adhering to non-medication therapy:  None    Summary of hospitalization:  [facility] discharge summary reviewed  Diagnostic Tests/Treatments reviewed.  Follow up needed: none  Other Healthcare Providers Involved in Patient s Care:         None  Update since discharge: worsened.         Plan of care communicated with patient                   Review of Systems  Constitutional, HEENT, cardiovascular, pulmonary, GI, , musculoskeletal, neuro,  "skin, endocrine and psych systems are negative, except as otherwise noted.      Objective    BP (!) 147/89 (BP Location: Right arm, Patient Position: Sitting, Cuff Size: Adult Regular)   Pulse 74   Temp 97.1  F (36.2  C) (Temporal)   Resp 16   Ht 1.778 m (5' 10\")   Wt 78.5 kg (173 lb)   SpO2 97%   BMI 24.82 kg/m    Body mass index is 24.82 kg/m .  Physical Exam   GENERAL: alert and no distress  RESP: lungs clear to auscultation - no rales, rhonchi or wheezes  CV: regular rate and rhythm, normal S1 S2, no S3 or S4, no murmur, click or rub, no peripheral edema   MS: Very tender on the left femur where the left hip has been replaced  Right elbow septic olecranon bursitis  PSYCH: mentation appears normal, tearful, and anxious    Patient Active Problem List   Diagnosis    Essential hypertension, benign    Pain in joint, upper arm    Allergic rhinitis    Pain in joint, lower leg    Chronic airway obstruction (H)    Monoclonal paraproteinemia    Immunodeficiency (H24)    Eczema    Transient cerebral ischemia    Bunion    Occipital neuralgia    Hyperlipidemia LDL goal <100    Health Care Home    Low back pain    Olecranon bursitis of right elbow    Bruit    Retina hole    signed & scanned on 09/23/2011  (1-4-2013 printed but not scanned in)     Chronic, continuous use of opioids    Atherosclerosis of native coronary artery of native heart without angina pectoris    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, unspecified thyrotoxicosis type    Right-sided low back pain with right-sided sciatica    Coronary artery disease involving native coronary artery of native heart without angina pectoris    BRENNEN (obstructive sleep apnea)    Hammer toe of right foot    Hallux limitus of right foot    Corn of toe    Non-recurrent unilateral inguinal hernia with obstruction without gangrene    Left inguinal hernia    Metastasis from thyroid cancer (H)    Pulmonary nodules    Preoperative examination    Chronic pain disorder    Dehydration "    Shock (H)    CALLUM (acute kidney injury) (H24)    COPD, very severe (H)    Hypokalemia    Hypoxia    Spleen hematoma, initial encounter    Fall, initial encounter    SAH (subarachnoid hemorrhage) (H)    Malignant neoplasm metastatic to lung, unspecified laterality (H)    Hospital-acquired pneumonia    Radiation pneumonitis (H24)    Physical deconditioning     Current Outpatient Medications   Medication Sig Dispense Refill    acetaminophen (TYLENOL) 500 MG tablet Take 1,000 mg by mouth every 8 hours as needed      acetylcysteine (MUCOMYST) 20 % neb solution Take 2 mLs by nebulization 4 times daily      albuterol (ACCUNEB) 1.25 MG/3ML neb solution Take 1 vial (1.25 mg) by nebulization every 4 hours as needed for shortness of breath or wheezing 90 mL 4    albuterol (PROAIR HFA/PROVENTIL HFA/VENTOLIN HFA) 108 (90 Base) MCG/ACT inhaler INHALE 2 PUFFS BY MOUTH EVERY 6 HOURS AS NEEDED FOR WHEEZE OR FOR SHORTNESS OF BREATH 18 g 3    alendronate (FOSAMAX) 70 MG tablet Take 1 tablet (70 mg) by mouth every 7 days 12 tablet 3    amLODIPine (NORVASC) 5 MG tablet Take 1 tablet (5 mg) by mouth at bedtime 90 tablet 3    ascorbic acid 1000 MG TABS tablet Take 1,000 mg by mouth daily      aspirin 81 MG EC tablet Take 81 mg by mouth 2 times daily      calcium carbonate (OS-KARLIE) 1500 (600 Ca) MG tablet Take 600 mg by mouth daily      carboxymethylcellulose PF (REFRESH PLUS) 0.5 % ophthalmic solution Place 1 drop into both eyes every hour as needed for dry eyes      cephALEXin (KEFLEX) 500 MG capsule Take 1 capsule (500 mg) by mouth 3 times daily 21 capsule 0    cholecalciferol 25 MCG (1000 UT) TABS Take 1,000 Units by mouth daily       cyclobenzaprine (FLEXERIL) 5 MG tablet Take 1 tablet (5 mg) by mouth nightly as needed for muscle spasms (Patient taking differently: Take 10 mg by mouth nightly as needed for muscle spasms) 21 tablet 1    DULoxetine (CYMBALTA) 20 MG capsule Take 1 capsule (20 mg) by mouth 2 times daily 180 capsule 3     fluticasone (ARNUITY ELLIPTA) 100 MCG/ACT inhaler Inhale 1 puff into the lungs daily Rinse mouth after use. 1 each 4    Fluticasone-Umeclidin-Vilant (TRELEGY ELLIPTA) 100-62.5-25 MCG/ACT oral inhaler Inhale 1 puff into the lungs daily 90 each 3    Fluticasone-Umeclidin-Vilanterol (TRELEGY ELLIPTA) 200-62.5-25 MCG/ACT oral inhaler Inhale 1 puff into the lungs daily In AM. Rinse mouth after use. 60 each 11    furosemide (LASIX) 20 MG tablet Take 1-2 tablets (20-40 mg) by mouth every other day Give 40 mg by mouth  every other day for COPD AND Give 20 mg by mouth very other day HTN, 90 tablet 3    gabapentin (NEURONTIN) 400 MG capsule Take 1 capsule (400 mg) by mouth 3 times daily 270 capsule 3    hydrocortisone 2.5 % cream Apply topically 2 times daily as needed for itching      ipratropium - albuterol 0.5 mg/2.5 mg/3 mL (DUONEB) 0.5-2.5 (3) MG/3ML neb solution Take 1 vial (3 mLs) by nebulization 4 times daily      KLOR-CON 20 MEQ CR tablet TAKE 1 TABLET BY MOUTH 2 TIMES DAILY 180 tablet 3    levalbuterol (XOPENEX) 0.31 MG/3ML neb solution Inhale 0.31 mg into the lungs      levothyroxine (SYNTHROID/LEVOTHROID) 125 MCG tablet Take 1 tablet (125 mcg) by mouth daily 90 tablet 3    MegaRed Omega-3 Krill Oil 500 MG CAPS Take 500 mg by mouth daily      Melatonin 10 MG TABS tablet Take 10 mg by mouth At Bedtime      methimazole (TAPAZOLE) 5 MG tablet Take 5 mg by mouth daily      methotrexate 2.5 MG tablet       metoprolol succinate ER (TOPROL XL) 50 MG 24 hr tablet Take 1 tablet (50 mg) by mouth every evening      multivitamin w/minerals (THERA-VIT-M) tablet Take 1 tablet by mouth daily       nitroGLYcerin (NITROSTAT) 0.4 MG sublingual tablet FOR CHEST PAIN PLACE 1 TAB UNDER TONGUE EVERY 5 MIN FOR 3 DOSES. IF SYMPTOMS PERSIST 5 MIN AFTER 1ST DOSE CALL 911 25 tablet 3    oxyCODONE (ROXICODONE) 5 MG tablet Take 1 tablet (5 mg) by mouth every 8 hours as needed for severe pain 90 tablet 0    polyethylene glycol (MIRALAX) 17  GM/Dose powder Take 17 g by mouth daily      predniSONE (DELTASONE) 1 MG tablet Take 2 tablets (2 mg) by mouth daily For COPD 60 tablet 4    roflumilast (DALIRESP) 500 MCG TABS tablet       rosuvastatin (CRESTOR) 10 MG tablet TAKE 1 TABLET BY MOUTH EVERY DAY 90 tablet 3    senna-docusate (SENOKOT-S/PERICOLACE) 8.6-50 MG tablet Take 1 tablet by mouth 2 times daily as needed for constipation      tamsulosin (FLOMAX) 0.4 MG capsule Take 1 capsule (0.4 mg) by mouth daily      traZODone (DESYREL) 50 MG tablet Take 50 mg by mouth at bedtime       No current facility-administered medications for this visit.             Signed Electronically by: Yaneli Dozier MD

## 2024-04-22 NOTE — LETTER
Opioid / Opioid Plus Controlled Substance Agreement    This is an agreement between you and your provider about the safe and appropriate use of controlled substance/opioids prescribed by your care team. Controlled substances are medicines that can cause physical and mental dependence (abuse).    There are strict laws about having and using these medicines. We here at [facility] are committing to working with you in your efforts to get better. To support you in this work, we ll help you schedule regular office appointments for medicine refills. If we must cancel or change your appointment for any reason, we ll make sure you have enough medicine to last until your next appointment.     As a Provider, I will:  Listen carefully to your concerns and treat you with respect.   Recommend a treatment plan that I believe is in your best interest. This plan may involve therapies other than opioid pain medication.   Talk with you often about the possible benefits, and the risk of harm of any medicine that we prescribe for you.   Provide a plan on how to taper (discontinue or go off) using this medicine if the decision is made to stop its use.    As a Patient, I understand that opioid(s):   Are a controlled substance prescribed by my care team to help me function or work and manage my condition(s).   Are strong medicines and can cause serious side effects such as:  Drowsiness, which can seriously affect my driving ability  A lower breathing rate, enough to cause death  Harm to my thinking ability   Depression   Abuse of and addiction to this medicine  Need to be taken exactly as prescribed. Combining opioids with certain medicines or chemicals (such as illegal drugs, sedatives, sleeping pills, and benzodiazepines) can be dangerous or even fatal. If I stop opioids suddenly, I may have severe withdrawal symptoms.  Do not work for all types of pain nor for all patients. If they re not helpful, I may be asked to stop  them.        The risks, benefits and side effects of these medicine(s) were explained to me. I agree that:  I will take part in other treatments as advised by my care team. This may be psychiatry or counseling, physical therapy, behavioral therapy, group treatment or a referral to a specialist.     I will keep all my appointments. I understand that this is part of the monitoring of opioids. My care team may require an office visit for EVERY opioid/controlled substance refill. If I miss appointments or don t follow instructions, my care team may stop my medicine.    I will take my medicines as prescribed. I will not change the dose or schedule unless my care team tells me to. There will be no refills if I run out early.     I may be asked to come to the clinic and complete a urine drug test or complete a pill count at any time. If I don t give a urine sample or participate in a pill count, the care team may stop my medicine.    I will only receive prescriptions from this clinic for chronic pain. If I am treated by another provider for acute pain issues, I will tell them that I am taking opioid pain medication for chronic pain and that I have a treatment agreement with this provider. I will inform my [facility] care team within one business day if I am given a prescription for any pain medication by another healthcare provider. My [facility] care team can contact other providers and pharmacists about my use of any medicines.    It is up to me to make sure that I don t run out of my medicines on weekends or holidays. If my care team is willing to refill my opioid prescription without a visit, I must request refills only during office hours. Refills may take up to 3 business days to process. I will use one pharmacy to fill all my opioid and other controlled substance prescriptions. I will notify the clinic about any changes to my insurance or medication availability.    I am responsible for my  prescriptions. If the medicine/prescription is lost, stolen or destroyed, it will not be replaced. I also agree not to share controlled substance medicines with anyone.    I am aware I should not use any illegal or recreational drugs. I agree not to drink alcohol unless my care team says I can.       If I enroll in the [place] [facility] Cannabis program, I will tell my care team prior to my next refill.     I will tell my care team right away if I become pregnant, have a new medical problem treated outside of my regular clinic, or have a change in my medications.    I understand that this medicine can affect my thinking, judgment and reaction time. Alcohol and drugs affect the brain and body, which can affect the safety of my driving. Being under the influence of alcohol or drugs can affect my decision-making, behaviors, personal safety, and the safety of others. Driving while impaired (DWI) can occur if a person is driving, operating, or in physical control of a car, motorcycle, boat, snowmobile, ATV, motorbike, off-road vehicle, or any other motor vehicle ([place] Statute 169A.20). I understand the risk if I choose to drive or operate any vehicle or machinery.    I understand that if I do not follow any of the conditions above, my prescriptions or treatment may be stopped or changed.          Opioids  What You Need to Know    What are opioids?   Opioids are pain medicines that must be prescribed by a doctor. They are also known as narcotics.     Examples are:   morphine (MS Contin, Nuha)  oxycodone (Oxycontin)  oxycodone and acetaminophen (Percocet)  hydrocodone and acetaminophen (Vicodin, Norco)   fentanyl patch (Duragesic)   hydromorphone (Dilaudid)   methadone  codeine (Tylenol #3)     What do opioids do well?   Opioids are best for severe short-term pain such as after a surgery or injury. They may work well for cancer pain. They may help some people with long-lasting (chronic) pain.     What do opioids NOT do  well?   Opioids never get rid of pain entirely, and they don t work well for most patients with chronic pain. Opioids don t reduce swelling, one of the causes of pain.                                    Other ways to manage chronic pain and improve function include:     Treat the health problem that may be causing pain  Anti-inflammation medicines, which reduce swelling and tenderness, such as ibuprofen (Advil, Motrin) or naproxen (Aleve)  Acetaminophen (Tylenol)  Antidepressants and anti-seizure medicines, especially for nerve pain  Topical treatments such as patches or creams  Injections or nerve blocks  Chiropractic or osteopathic treatment  Acupuncture, massage, deep breathing, meditation, visual imagery, aromatherapy  Use heat or ice at the pain site  Physical therapy   Exercise  Stop smoking  Take part in therapy       Risks and side effects     Talk to your doctor before you start or decide to keep taking opioids. Possible side effects include:    Lowering your breathing rate enough to cause death  Overdose, including death, especially if taking higher than prescribed doses  Worse depression symptoms; less pleasure in things you usually enjoy  Feeling tired or sluggish  Slower thoughts or cloudy thinking  Being more sensitive to pain over time; pain is harder to control  Trouble sleeping or restless sleep  Changes in hormone levels (for example, less testosterone)  Changes in sex drive or ability to have sex  Constipation  Unsafe driving  Itching and sweating  Dizziness  Nausea, throwing up and dry mouth    What else should I know about opioids?    Opioids may lead to dependence, tolerance, or addiction.    Dependence means that if you stop or reduce the medicine too quickly, you will have withdrawal symptoms. These include loose poop (diarrhea), jitters, flu-like symptoms, nervousness and tremors. Dependence is not the same as addiction.                     Tolerance means needing higher doses over time to  get the same effect. This may increase the chance of serious side effects.    Addiction is when people improperly use a substance that harms their body, their mind or their relations with others. Use of opiates can cause a relapse of addiction if you have a history of drug or alcohol abuse.    People who have used opioids for a long time may have a lower quality of life, worse depression, higher levels of pain and more visits to doctors.    You can overdose on opioids. Take these steps to lower your risk of overdose:    Recognize the signs:  Signs of overdose include decrease or loss of consciousness (blackout), slowed breathing, trouble waking up and blue lips. If someone is worried about overdose, they should call 911.    Talk to your doctor about Narcan (naloxone).   If you are at risk for overdose, you may be given a prescription for Narcan. This medicine very quickly reverses the effects of opioids.   If you overdose, a friend or family member can give you Narcan while waiting for the ambulance. They need to know the signs of overdose and how to give Narcan.     Don't use alcohol or street drugs.   Taking them with opioids can cause death.    Do not take any of these medicines unless your doctor says it s OK. Taking these with opioids can cause death:  Benzodiazepines, such as lorazepam (Ativan), alprazolam (Xanax) or diazepam (Valium)  Muscle relaxers, such as cyclobenzaprine (Flexeril)  Sleeping pills like zolpidem (Ambien)   Other opioids      How to keep you and other people safe while taking opioids:    Never share your opioids with others.  Opioid medicines are regulated by the Drug Enforcement Agency (MIKE). Selling or sharing medications is a criminal act.    2. Be sure to store opioids in a secure place, locked up if possible. Young children can easily swallow them and overdose.    3. When you are traveling with your medicines, keep them in the original bottles. If you use a pill box, be sure you also  carry a copy of your medicine list from your clinic or pharmacy.    4. Safe disposal of opioids    Most pharmacies have places to get rid of medicine, called disposal kiosks. Medicine disposal options are also available in every [facility]. Search your county and  medication disposal  to find more options. You can find more details at:  [email or web address].[email or web address]-[email or web address]-medications     I agree that my provider, clinic care team, and pharmacy may work with any city, state or federal law enforcement agency that investigates the misuse, sale, or other diversion of my controlled medicine. I will allow my provider to discuss my care with, or share a copy of, this agreement with any other treating provider, pharmacy or emergency room where I receive care.    I have read this agreement and have asked questions about anything I did not understand.    _______________________________________________________  Patient Signature - Harrison Thomas _____________________                   Date     _______________________________________________________  Provider Signature - Yaneli Dozier MD   _____________________                   Date     _______________________________________________________  Witness Signature (required if provider not present while patient signing)   _____________________                   Date

## 2024-04-23 ENCOUNTER — TELEPHONE (OUTPATIENT)
Dept: FAMILY MEDICINE | Facility: CLINIC | Age: 79
End: 2024-04-23
Payer: MEDICARE

## 2024-04-23 LAB
ALBUMIN SERPL BCG-MCNC: 4.2 G/DL (ref 3.5–5.2)
ALP SERPL-CCNC: 83 U/L (ref 40–150)
ALT SERPL W P-5'-P-CCNC: 18 U/L (ref 0–70)
ANION GAP SERPL CALCULATED.3IONS-SCNC: 10 MMOL/L (ref 7–15)
AST SERPL W P-5'-P-CCNC: 19 U/L (ref 0–45)
BILIRUB SERPL-MCNC: 0.6 MG/DL
BUN SERPL-MCNC: 12.1 MG/DL (ref 8–23)
CALCIUM SERPL-MCNC: 9.5 MG/DL (ref 8.8–10.2)
CHLORIDE SERPL-SCNC: 104 MMOL/L (ref 98–107)
CHOLEST SERPL-MCNC: 132 MG/DL
CREAT SERPL-MCNC: 0.98 MG/DL (ref 0.67–1.17)
CRP SERPL-MCNC: 26 MG/L
DEPRECATED HCO3 PLAS-SCNC: 27 MMOL/L (ref 22–29)
EGFRCR SERPLBLD CKD-EPI 2021: 80 ML/MIN/1.73M2
FASTING STATUS PATIENT QL REPORTED: NORMAL
GLUCOSE SERPL-MCNC: 105 MG/DL (ref 70–99)
HDLC SERPL-MCNC: 40 MG/DL
LDLC SERPL CALC-MCNC: 66 MG/DL
NONHDLC SERPL-MCNC: 92 MG/DL
POTASSIUM SERPL-SCNC: 4.7 MMOL/L (ref 3.4–5.3)
PROT SERPL-MCNC: 7 G/DL (ref 6.4–8.3)
SODIUM SERPL-SCNC: 141 MMOL/L (ref 135–145)
T4 FREE SERPL-MCNC: 1.97 NG/DL (ref 0.9–1.7)
TRIGL SERPL-MCNC: 128 MG/DL
TSH SERPL DL<=0.005 MIU/L-ACNC: 4.78 UIU/ML (ref 0.3–4.2)

## 2024-04-23 NOTE — TELEPHONE ENCOUNTER
DIAGNOSIS: Injury of left hip, subsequent encounter [S79.912D]  Olecranon bursa abscess, right,   APPOINTMENT DATE: 4/25/2024    Records Requested     April 23, 2024 10:44 AM  [facility]   Facility  [facility]    Outcome I called the IMG Dept [phone]- they will push some scans from 2024 over soon.     I called [facility] [phone]- they will push the Xray Pelvis scans from 2021 and Xray Femur     4/25/2024 8:33AM MARYJANE   I resolved scans in PACS      NOTES STATUS DETAILS   OFFICE NOTE from referring provider Internal Yaneli Dozier MD @ [facility]    DISCHARGE REPORT from the ER Care Everywhere 4/17/2024 ED- Contusion of left hip   MEDICATION LIST Internal    DEXA (osteoporosis/bone health) Internal DX Hip/Pelvis/Spine 7/13/2023        CT SCAN Internal CT Pelvis Bone 6/6/2023   XRAYS (IMAGES & REPORTS) Internal    In PACS- [facility]    IN PACS- [facility] Xray Pelvis and Hip 6/14/2023, 6/12/2023    [facility]  Xray Femur 4/17/2024  Xray pelvis 4/17/2024     [facility]  Xray Pelvis and Hip 4/16/2021 and 4/15/2021    Xray Femur LT 4/15/2021

## 2024-04-24 ENCOUNTER — TELEPHONE (OUTPATIENT)
Dept: FAMILY MEDICINE | Facility: CLINIC | Age: 79
End: 2024-04-24
Payer: MEDICARE

## 2024-04-24 NOTE — PROGRESS NOTES
[facility]  Sports Medicine Clinic  Clinics and Surgery Center           SUBJECTIVE       Harrison Thomas is a 78 year old male presenting to clinic today. Today, the patient reports that he had a fall about 2 weeks ago, trying to get out of the car when it was raining. He is unsure if the door pushed him or if his walking stick slipped. He fell onto the pavement, he was seen in the ED on 4/17/24 any they obtained XR that revealed no fractures. He is having more difficulties getting around since his fall. His pain is located over the thigh. Pain is worse with weightbearing activities.     Hx of left partial LIZABETH and right LIZABETH.    Background:   Occupation: Retired  Hand Dominance (If pertinent): NA    Injury (Y/N): Yes  Work Comp (Y/N): No  Date of injury: 4/17/24  Mechanism of Injury: fall onto cement    Duration of symptoms: 8 days   Intensity (1-10): 10/10   Aggravating factors: WB activities    Relieving Factors: Sitting, rest    Prior Evaluation: Yes, ED 4/17/24   Previous Surgery on the area (Y/N): Yes left partial LIZABETH   Physical Therapy (Previous/Current/None): Yes, but not for this injury    Physical Activity/Exercise (What, How Often): No      PMH, Medications and Allergies were reviewed and updated as needed.    ROS:  As noted above otherwise negative.    Patient Active Problem List   Diagnosis    Essential hypertension, benign    Pain in joint, upper arm    Allergic rhinitis    Pain in joint, lower leg    Chronic airway obstruction (H)    Monoclonal paraproteinemia    Immunodeficiency (H24)    Eczema    Transient cerebral ischemia    Bunion    Occipital neuralgia    Hyperlipidemia LDL goal <100    Health Care Home    Low back pain    Olecranon bursitis of right elbow    Bruit    Retina hole    signed & scanned on 09/23/2011  (1-4-2013 printed but not scanned in)     Chronic, continuous use of opioids    Atherosclerosis of native coronary artery of native heart without angina pectoris     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, unspecified thyrotoxicosis type    Right-sided low back pain with right-sided sciatica    Coronary artery disease involving native coronary artery of native heart without angina pectoris    BRENNEN (obstructive sleep apnea)    Hammer toe of right foot    Hallux limitus of right foot    Corn of toe    Non-recurrent unilateral inguinal hernia with obstruction without gangrene    Left inguinal hernia    Metastasis from thyroid cancer (H)    Pulmonary nodules    Preoperative examination    Chronic pain disorder    Dehydration    Shock (H)    CALLUM (acute kidney injury) (H24)    COPD, very severe (H)    Hypokalemia    Hypoxia    Spleen hematoma, initial encounter    Fall, initial encounter    SAH (subarachnoid hemorrhage) (H)    Malignant neoplasm metastatic to lung, unspecified laterality (H)    Hospital-acquired pneumonia    Radiation pneumonitis (H24)    Physical deconditioning       Current Outpatient Medications   Medication Sig Dispense Refill    acetaminophen (TYLENOL) 500 MG tablet Take 1,000 mg by mouth every 8 hours as needed      acetylcysteine (MUCOMYST) 20 % neb solution Take 2 mLs by nebulization 4 times daily      albuterol (ACCUNEB) 1.25 MG/3ML neb solution Take 1 vial (1.25 mg) by nebulization every 4 hours as needed for shortness of breath or wheezing 90 mL 4    albuterol (PROAIR HFA/PROVENTIL HFA/VENTOLIN HFA) 108 (90 Base) MCG/ACT inhaler INHALE 2 PUFFS BY MOUTH EVERY 6 HOURS AS NEEDED FOR WHEEZE OR FOR SHORTNESS OF BREATH 18 g 3    alendronate (FOSAMAX) 70 MG tablet Take 1 tablet (70 mg) by mouth every 7 days 12 tablet 3    amLODIPine (NORVASC) 5 MG tablet Take 1 tablet (5 mg) by mouth at bedtime 90 tablet 3    ascorbic acid 1000 MG TABS tablet Take 1,000 mg by mouth daily      aspirin 81 MG EC tablet Take 81 mg by mouth 2 times daily      calcium carbonate (OS-KARLEI) 1500 (600 Ca) MG tablet Take 600 mg by mouth daily      carboxymethylcellulose PF (REFRESH PLUS) 0.5 %  ophthalmic solution Place 1 drop into both eyes every hour as needed for dry eyes      cephALEXin (KEFLEX) 500 MG capsule Take 1 capsule (500 mg) by mouth 3 times daily 21 capsule 0    cholecalciferol 25 MCG (1000 UT) TABS Take 1,000 Units by mouth daily       cyclobenzaprine (FLEXERIL) 5 MG tablet Take 1 tablet (5 mg) by mouth nightly as needed for muscle spasms (Patient taking differently: Take 10 mg by mouth nightly as needed for muscle spasms) 21 tablet 1    DULoxetine (CYMBALTA) 20 MG capsule Take 1 capsule (20 mg) by mouth 2 times daily 180 capsule 3    fluticasone (ARNUITY ELLIPTA) 100 MCG/ACT inhaler Inhale 1 puff into the lungs daily Rinse mouth after use. 1 each 4    Fluticasone-Umeclidin-Vilant (TRELEGY ELLIPTA) 100-62.5-25 MCG/ACT oral inhaler Inhale 1 puff into the lungs daily 90 each 3    Fluticasone-Umeclidin-Vilanterol (TRELEGY ELLIPTA) 200-62.5-25 MCG/ACT oral inhaler Inhale 1 puff into the lungs daily In AM. Rinse mouth after use. 60 each 11    furosemide (LASIX) 20 MG tablet Take 1-2 tablets (20-40 mg) by mouth every other day Give 40 mg by mouth  every other day for COPD AND Give 20 mg by mouth very other day HTN, 90 tablet 3    gabapentin (NEURONTIN) 400 MG capsule Take 1 capsule (400 mg) by mouth 3 times daily 270 capsule 3    hydrocortisone 2.5 % cream Apply topically 2 times daily as needed for itching      ipratropium - albuterol 0.5 mg/2.5 mg/3 mL (DUONEB) 0.5-2.5 (3) MG/3ML neb solution Take 1 vial (3 mLs) by nebulization 4 times daily      KLOR-CON 20 MEQ CR tablet TAKE 1 TABLET BY MOUTH 2 TIMES DAILY 180 tablet 3    levalbuterol (XOPENEX) 0.31 MG/3ML neb solution Inhale 0.31 mg into the lungs      levothyroxine (SYNTHROID/LEVOTHROID) 125 MCG tablet Take 1 tablet (125 mcg) by mouth daily 90 tablet 3    MegaRed Omega-3 Krill Oil 500 MG CAPS Take 500 mg by mouth daily      Melatonin 10 MG TABS tablet Take 10 mg by mouth At Bedtime      metoprolol succinate ER (TOPROL XL) 50 MG 24 hr tablet  Take 1 tablet (50 mg) by mouth every evening      multivitamin w/minerals (THERA-VIT-M) tablet Take 1 tablet by mouth daily       nitroGLYcerin (NITROSTAT) 0.4 MG sublingual tablet FOR CHEST PAIN PLACE 1 TAB UNDER TONGUE EVERY 5 MIN FOR 3 DOSES. IF SYMPTOMS PERSIST 5 MIN AFTER 1ST DOSE CALL 911 25 tablet 3    oxyCODONE (ROXICODONE) 5 MG tablet Take 1 tablet (5 mg) by mouth every 8 hours as needed for severe pain 90 tablet 0    polyethylene glycol (MIRALAX) 17 GM/Dose powder Take 17 g by mouth daily      predniSONE (DELTASONE) 1 MG tablet Take 2 tablets (2 mg) by mouth daily For COPD 60 tablet 4    roflumilast (DALIRESP) 500 MCG TABS tablet       rosuvastatin (CRESTOR) 10 MG tablet TAKE 1 TABLET BY MOUTH EVERY DAY 90 tablet 3    senna-docusate (SENOKOT-S/PERICOLACE) 8.6-50 MG tablet Take 1 tablet by mouth 2 times daily as needed for constipation      tamsulosin (FLOMAX) 0.4 MG capsule Take 1 capsule (0.4 mg) by mouth daily      traZODone (DESYREL) 50 MG tablet Take 50 mg by mouth at bedtime              OBJECTIVE:       Vitals: There were no vitals filed for this visit.  BMI: There is no height or weight on file to calculate BMI.    Gen:  Well nourished and in no acute distress  HEENT: Extraocular movement intact  Neck: Supple  Pulm:  Breathing Comfortably. No increased respiratory effort.  Psych: Euthymic. Appropriately answers questions    MSK: Patient examined within the confines of the wheelchair given difficulty with ambulation.  Patient is able to extend his knee, but is very difficult.  Lifting of the hip is also very difficult for him at this time.  He is having more pain in the anterior thigh region with palpation.  Limited exam given patient's functional status.  Elevation of the hip does lead to inguinal pain.  Internal rotation also painful.  Positive FADIR to the inguinal crease through posterior joint line.  Remainder of exam very difficult given the lack of mobility.      XR Femur Left 2 Views  Order:  [id number]  Impression    Left femoral hemiarthroplasty. No fracture or dislocation. No loosening. Mild medial compartment narrowing in the left knee.  Narrative    For Patients: As a result of the 21st Century Cures Act, medical imaging exams and procedure reports are released immediately into your electronic medical record. You may view this report before your referring provider. If you have questions, please contact your health care provider.    EXAM: XR FEMUR 2 VIEWS LEFT  LOCATION: [facility]  DATE: 4/17/2024    XR PELVIS 1 VIEW  Order: [id number]  Impression    No fracture. Right total hip arthroplasty. Left femoral hemiarthroplasty.  Narrative    For Patients: As a result of the 21st Century Cures Act, medical imaging exams and procedure reports are released immediately into your electronic medical record. You may view this report before your referring provider. If you have questions, please contact your health care provider.    EXAM: XR PELVIS 1 VIEW  LOCATION: [facility]  DATE: 4/17/2024          ASSESSMENT and PLAN:     Harrison was seen today for pain.    Diagnoses and all orders for this visit:    Injury of left hip, subsequent encounter  -     Orthopedic  Referral  -     CT Femur Thigh Left w/o Contrast; Future  -     Orthopedic  Referral; Future      78-year-old male presenting to clinic today with left hip/anterior thigh pain after a fall.  Patient did have a plethora of images from April 17 which were all relatively negative.  A CT of his thigh was also ordered at that time, but the patient did not get it.  Given the lack of field-of-view from the x-rays going into the area of tenderness on the thigh, I do think a stat CT of the femur is reasonable.  This has been ordered.  Given the fact the patient has had difficulty with his hip replacement/partial since the time he had it, and is reproducible inguinal crease/joint line pain with examination, I have placed a referral to one of  our orthopedic hip surgeons for overall discussion.  Patient was made aware that he needs to have a second surgery on his hip, because of that this order has also been placed.  All the patient's questions have been answered.  We will reach out to the patient with any urgent findings that are found on the stat CT.  Patient lives in [place], so him getting back to our site may be a bit difficult.  ER and urgent care precautions have been discussed in great detail.  Return precautions also advised.  In the meantime, I do want the patient using some form of an assistive device with any ambulation.  At home he has a walker, which she did not bring today.  I have counseled him on using this at all times until the CT results have been followed up with him.    Options for treatment and/or follow-up care were reviewed with the patient was actively involved in the decision making process. Patient verbalized understanding and was in agreement with the plan.    Emory Garcia DO  , Sports Medicine  Department of [facility] and [facility]

## 2024-04-24 NOTE — TELEPHONE ENCOUNTER
Home Care is calling regarding an established patient with [facility].        1/19/2024     1:54 PM   Home Care Information   Date of Home Care episode start 1/22/2024   Current following provider Dr. Dozier   Date provider agreed to follow 1/22/2024    Name/Phone Number Haydeeor ROGERS,[phone]   Home Care agency [facility]     Requesting orders from: Yaneli Dozier  Provider is following patient: Yes  Is this a 60-day recertification request?  No    Orders Requested    Social Work  Request for initial evaluation and treatment (one time)   Verbal ok given per policy.     Confirmed ok to leave a detailed message with call back.  Contact information confirmed and updated as needed.    Cesilia Ma RN

## 2024-04-25 ENCOUNTER — OFFICE VISIT (OUTPATIENT)
Dept: ORTHOPEDICS | Facility: CLINIC | Age: 79
End: 2024-04-25
Attending: INTERNAL MEDICINE
Payer: MEDICARE

## 2024-04-25 ENCOUNTER — PRE VISIT (OUTPATIENT)
Dept: ORTHOPEDICS | Facility: CLINIC | Age: 79
End: 2024-04-25

## 2024-04-25 ENCOUNTER — ANCILLARY PROCEDURE (OUTPATIENT)
Dept: CT IMAGING | Facility: CLINIC | Age: 79
End: 2024-04-25
Attending: STUDENT IN AN ORGANIZED HEALTH CARE EDUCATION/TRAINING PROGRAM
Payer: MEDICARE

## 2024-04-25 DIAGNOSIS — S79.912D INJURY OF LEFT HIP, SUBSEQUENT ENCOUNTER: ICD-10-CM

## 2024-04-25 PROCEDURE — 99204 OFFICE O/P NEW MOD 45 MIN: CPT | Performed by: STUDENT IN AN ORGANIZED HEALTH CARE EDUCATION/TRAINING PROGRAM

## 2024-04-25 PROCEDURE — 73700 CT LOWER EXTREMITY W/O DYE: CPT | Mod: LT | Performed by: RADIOLOGY

## 2024-04-25 PROCEDURE — G1010 CDSM STANSON: HCPCS | Mod: GC | Performed by: RADIOLOGY

## 2024-04-25 NOTE — LETTER
4/25/2024      RE: Harrison Thomas  [address] [facility] [address]  [facility] [address]     Dear Colleague,    Thank you for referring your patient, Harrison Thomas, to the [facility] [place]. Please see a copy of my visit note below.    [facility]  Sports Medicine Clinic  [facility]           SUBJECTIVE       Harrison Thomas is a 78 year old male presenting to clinic today. Today, the patient reports that he had a fall about 2 weeks ago, trying to get out of the car when it was raining. He is unsure if the door pushed him or if his walking stick slipped. He fell onto the pavement, he was seen in the ED on 4/17/24 any they obtained XR that revealed no fractures. He is having more difficulties getting around since his fall. His pain is located over the thigh. Pain is worse with weightbearing activities.     Hx of left partial LIZABETH and right LIZABETH.    Background:   Occupation: Retired  Hand Dominance (If pertinent): NA    Injury (Y/N): Yes  Work Comp (Y/N): No  Date of injury: 4/17/24  Mechanism of Injury: fall onto cement    Duration of symptoms: 8 days   Intensity (1-10): 10/10   Aggravating factors: WB activities    Relieving Factors: Sitting, rest    Prior Evaluation: Yes, ED 4/17/24   Previous Surgery on the area (Y/N): Yes left partial LIZABETH   Physical Therapy (Previous/Current/None): Yes, but not for this injury    Physical Activity/Exercise (What, How Often): No      PMH, Medications and Allergies were reviewed and updated as needed.    ROS:  As noted above otherwise negative.    Patient Active Problem List   Diagnosis     Essential hypertension, benign     Pain in joint, upper arm     Allergic rhinitis     Pain in joint, lower leg     Chronic airway obstruction (H)     Monoclonal paraproteinemia     Immunodeficiency (H24)     Eczema     Transient cerebral ischemia     Bunion     Occipital neuralgia     Hyperlipidemia LDL goal <100     Health Care Home      Low back pain     Olecranon bursitis of right elbow     Bruit     Retina hole     signed & scanned on 09/23/2011  (1-4-2013 printed but not scanned in)      Chronic, continuous use of opioids     Atherosclerosis of native coronary artery of native heart without angina pectoris     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, unspecified thyrotoxicosis type     Right-sided low back pain with right-sided sciatica     Coronary artery disease involving native coronary artery of native heart without angina pectoris     BRENNEN (obstructive sleep apnea)     Hammer toe of right foot     Hallux limitus of right foot     Corn of toe     Non-recurrent unilateral inguinal hernia with obstruction without gangrene     Left inguinal hernia     Metastasis from thyroid cancer (H)     Pulmonary nodules     Preoperative examination     Chronic pain disorder     Dehydration     Shock (H)     CALLUM (acute kidney injury) (H24)     COPD, very severe (H)     Hypokalemia     Hypoxia     Spleen hematoma, initial encounter     Fall, initial encounter     SAH (subarachnoid hemorrhage) (H)     Malignant neoplasm metastatic to lung, unspecified laterality (H)     Hospital-acquired pneumonia     Radiation pneumonitis (H24)     Physical deconditioning       Current Outpatient Medications   Medication Sig Dispense Refill     acetaminophen (TYLENOL) 500 MG tablet Take 1,000 mg by mouth every 8 hours as needed       acetylcysteine (MUCOMYST) 20 % neb solution Take 2 mLs by nebulization 4 times daily       albuterol (ACCUNEB) 1.25 MG/3ML neb solution Take 1 vial (1.25 mg) by nebulization every 4 hours as needed for shortness of breath or wheezing 90 mL 4     albuterol (PROAIR HFA/PROVENTIL HFA/VENTOLIN HFA) 108 (90 Base) MCG/ACT inhaler INHALE 2 PUFFS BY MOUTH EVERY 6 HOURS AS NEEDED FOR WHEEZE OR FOR SHORTNESS OF BREATH 18 g 3     alendronate (FOSAMAX) 70 MG tablet Take 1 tablet (70 mg) by mouth every 7 days 12 tablet 3     amLODIPine (NORVASC) 5 MG tablet  Take 1 tablet (5 mg) by mouth at bedtime 90 tablet 3     ascorbic acid 1000 MG TABS tablet Take 1,000 mg by mouth daily       aspirin 81 MG EC tablet Take 81 mg by mouth 2 times daily       calcium carbonate (OS-KARLIE) 1500 (600 Ca) MG tablet Take 600 mg by mouth daily       carboxymethylcellulose PF (REFRESH PLUS) 0.5 % ophthalmic solution Place 1 drop into both eyes every hour as needed for dry eyes       cephALEXin (KEFLEX) 500 MG capsule Take 1 capsule (500 mg) by mouth 3 times daily 21 capsule 0     cholecalciferol 25 MCG (1000 UT) TABS Take 1,000 Units by mouth daily        cyclobenzaprine (FLEXERIL) 5 MG tablet Take 1 tablet (5 mg) by mouth nightly as needed for muscle spasms (Patient taking differently: Take 10 mg by mouth nightly as needed for muscle spasms) 21 tablet 1     DULoxetine (CYMBALTA) 20 MG capsule Take 1 capsule (20 mg) by mouth 2 times daily 180 capsule 3     fluticasone (ARNUITY ELLIPTA) 100 MCG/ACT inhaler Inhale 1 puff into the lungs daily Rinse mouth after use. 1 each 4     Fluticasone-Umeclidin-Vilant (TRELEGY ELLIPTA) 100-62.5-25 MCG/ACT oral inhaler Inhale 1 puff into the lungs daily 90 each 3     Fluticasone-Umeclidin-Vilanterol (TRELEGY ELLIPTA) 200-62.5-25 MCG/ACT oral inhaler Inhale 1 puff into the lungs daily In AM. Rinse mouth after use. 60 each 11     furosemide (LASIX) 20 MG tablet Take 1-2 tablets (20-40 mg) by mouth every other day Give 40 mg by mouth  every other day for COPD AND Give 20 mg by mouth very other day HTN, 90 tablet 3     gabapentin (NEURONTIN) 400 MG capsule Take 1 capsule (400 mg) by mouth 3 times daily 270 capsule 3     hydrocortisone 2.5 % cream Apply topically 2 times daily as needed for itching       ipratropium - albuterol 0.5 mg/2.5 mg/3 mL (DUONEB) 0.5-2.5 (3) MG/3ML neb solution Take 1 vial (3 mLs) by nebulization 4 times daily       KLOR-CON 20 MEQ CR tablet TAKE 1 TABLET BY MOUTH 2 TIMES DAILY 180 tablet 3     levalbuterol (XOPENEX) 0.31 MG/3ML neb  solution Inhale 0.31 mg into the lungs       levothyroxine (SYNTHROID/LEVOTHROID) 125 MCG tablet Take 1 tablet (125 mcg) by mouth daily 90 tablet 3     MegaRed Omega-3 Krill Oil 500 MG CAPS Take 500 mg by mouth daily       Melatonin 10 MG TABS tablet Take 10 mg by mouth At Bedtime       metoprolol succinate ER (TOPROL XL) 50 MG 24 hr tablet Take 1 tablet (50 mg) by mouth every evening       multivitamin w/minerals (THERA-VIT-M) tablet Take 1 tablet by mouth daily        nitroGLYcerin (NITROSTAT) 0.4 MG sublingual tablet FOR CHEST PAIN PLACE 1 TAB UNDER TONGUE EVERY 5 MIN FOR 3 DOSES. IF SYMPTOMS PERSIST 5 MIN AFTER 1ST DOSE CALL 911 25 tablet 3     oxyCODONE (ROXICODONE) 5 MG tablet Take 1 tablet (5 mg) by mouth every 8 hours as needed for severe pain 90 tablet 0     polyethylene glycol (MIRALAX) 17 GM/Dose powder Take 17 g by mouth daily       predniSONE (DELTASONE) 1 MG tablet Take 2 tablets (2 mg) by mouth daily For COPD 60 tablet 4     roflumilast (DALIRESP) 500 MCG TABS tablet        rosuvastatin (CRESTOR) 10 MG tablet TAKE 1 TABLET BY MOUTH EVERY DAY 90 tablet 3     senna-docusate (SENOKOT-S/PERICOLACE) 8.6-50 MG tablet Take 1 tablet by mouth 2 times daily as needed for constipation       tamsulosin (FLOMAX) 0.4 MG capsule Take 1 capsule (0.4 mg) by mouth daily       traZODone (DESYREL) 50 MG tablet Take 50 mg by mouth at bedtime              OBJECTIVE:       Vitals: There were no vitals filed for this visit.  BMI: There is no height or weight on file to calculate BMI.    Gen:  Well nourished and in no acute distress  HEENT: Extraocular movement intact  Neck: Supple  Pulm:  Breathing Comfortably. No increased respiratory effort.  Psych: Euthymic. Appropriately answers questions    MSK: Patient examined within the confines of the wheelchair given difficulty with ambulation.  Patient is able to extend his knee, but is very difficult.  Lifting of the hip is also very difficult for him at this time.  He is having  more pain in the anterior thigh region with palpation.  Limited exam given patient's functional status.  Elevation of the hip does lead to inguinal pain.  Internal rotation also painful.  Positive FADIR to the inguinal crease through posterior joint line.  Remainder of exam very difficult given the lack of mobility.      XR Femur Left 2 Views  Order: [id number]  Impression    Left femoral hemiarthroplasty. No fracture or dislocation. No loosening. Mild medial compartment narrowing in the left knee.  Narrative    For Patients: As a result of the 21st Century Cures Act, medical imaging exams and procedure reports are released immediately into your electronic medical record. You may view this report before your referring provider. If you have questions, please contact your health care provider.    EXAM: XR FEMUR 2 VIEWS LEFT  LOCATION: [facility]  DATE: 4/17/2024    XR PELVIS 1 VIEW  Order: [id number]  Impression    No fracture. Right total hip arthroplasty. Left femoral hemiarthroplasty.  Narrative    For Patients: As a result of the 21st Century Cures Act, medical imaging exams and procedure reports are released immediately into your electronic medical record. You may view this report before your referring provider. If you have questions, please contact your health care provider.    EXAM: XR PELVIS 1 VIEW  LOCATION: [facility]  DATE: 4/17/2024          ASSESSMENT and PLAN:     Harrison was seen today for pain.    Diagnoses and all orders for this visit:    Injury of left hip, subsequent encounter  -     Orthopedic  Referral  -     CT Femur Thigh Left w/o Contrast; Future  -     Orthopedic  Referral; Future      78-year-old male presenting to clinic today with left hip/anterior thigh pain after a fall.  Patient did have a plethora of images from April 17 which were all relatively negative.  A CT of his thigh was also ordered at that time, but the patient did not get it.  Given the lack of  field-of-view from the x-rays going into the area of tenderness on the thigh, I do think a stat CT of the femur is reasonable.  This has been ordered.  Given the fact the patient has had difficulty with his hip replacement/partial since the time he had it, and is reproducible inguinal crease/joint line pain with examination, I have placed a referral to one of our orthopedic hip surgeons for overall discussion.  Patient was made aware that he needs to have a second surgery on his hip, because of that this order has also been placed.  All the patient's questions have been answered.  We will reach out to the patient with any urgent findings that are found on the stat CT.  Patient lives in [place], so him getting back to our site may be a bit difficult.  ER and urgent care precautions have been discussed in great detail.  Return precautions also advised.  In the meantime, I do want the patient using some form of an assistive device with any ambulation.  At home he has a walker, which she did not bring today.  I have counseled him on using this at all times until the CT results have been followed up with him.    Options for treatment and/or follow-up care were reviewed with the patient was actively involved in the decision making process. Patient verbalized understanding and was in agreement with the plan.    Emory Garcia DO  , Sports Medicine  Department of Family Medicine and [facility]

## 2024-04-26 ENCOUNTER — APPOINTMENT (OUTPATIENT)
Dept: CT IMAGING | Facility: CLINIC | Age: 79
End: 2024-04-26
Attending: PHYSICIAN ASSISTANT
Payer: MEDICARE

## 2024-04-26 ENCOUNTER — HOSPITAL ENCOUNTER (OUTPATIENT)
Facility: CLINIC | Age: 79
Setting detail: OBSERVATION
Discharge: NURSING, REHAB OR LONG-TERM CARE | End: 2024-04-28
Attending: PHYSICIAN ASSISTANT | Admitting: HOSPITALIST
Payer: MEDICARE

## 2024-04-26 DIAGNOSIS — E89.0 POSTSURGICAL HYPOTHYROIDISM: Primary | ICD-10-CM

## 2024-04-26 DIAGNOSIS — M70.21 OLECRANON BURSITIS OF RIGHT ELBOW: Primary | ICD-10-CM

## 2024-04-26 DIAGNOSIS — S32.9XXA CLOSED NONDISPLACED FRACTURE OF PELVIS, UNSPECIFIED PART OF PELVIS, INITIAL ENCOUNTER (H): ICD-10-CM

## 2024-04-26 DIAGNOSIS — J44.9 COPD, VERY SEVERE (H): ICD-10-CM
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
## 2024-04-26 DIAGNOSIS — N17.9 AKI (ACUTE KIDNEY INJURY) (H): ICD-10-CM

## 2024-04-26 LAB
ANION GAP SERPL CALCULATED.3IONS-SCNC: 9 MMOL/L (ref 7–15)
BASOPHILS # BLD AUTO: 0.1 10E3/UL (ref 0–0.2)
BASOPHILS NFR BLD AUTO: 1 %
BUN SERPL-MCNC: 18.7 MG/DL (ref 8–23)
CALCIUM SERPL-MCNC: 9.1 MG/DL (ref 8.8–10.2)
CHLORIDE SERPL-SCNC: 101 MMOL/L (ref 98–107)
CHOLEST SERPL-MCNC: 124 MG/DL
CREAT SERPL-MCNC: 1.31 MG/DL (ref 0.67–1.17)
DEPRECATED HCO3 PLAS-SCNC: 26 MMOL/L (ref 22–29)
EGFRCR SERPLBLD CKD-EPI 2021: 56 ML/MIN/1.73M2
EOSINOPHIL # BLD AUTO: 0.2 10E3/UL (ref 0–0.7)
EOSINOPHIL NFR BLD AUTO: 2 %
ERYTHROCYTE [DISTWIDTH] IN BLOOD BY AUTOMATED COUNT: 13.5 % (ref 10–15)
GLUCOSE SERPL-MCNC: 84 MG/DL (ref 70–99)
HCT VFR BLD AUTO: 42 % (ref 40–53)
HDLC SERPL-MCNC: 41 MG/DL
HGB BLD-MCNC: 13.7 G/DL (ref 13.3–17.7)
IMM GRANULOCYTES # BLD: 0 10E3/UL
IMM GRANULOCYTES NFR BLD: 0 %
LDLC SERPL CALC-MCNC: 65 MG/DL
LYMPHOCYTES # BLD AUTO: 1.2 10E3/UL (ref 0.8–5.3)
LYMPHOCYTES NFR BLD AUTO: 15 %
MCH RBC QN AUTO: 29.1 PG (ref 26.5–33)
MCHC RBC AUTO-ENTMCNC: 32.6 G/DL (ref 31.5–36.5)
MCV RBC AUTO: 89 FL (ref 78–100)
MONOCYTES # BLD AUTO: 0.8 10E3/UL (ref 0–1.3)
MONOCYTES NFR BLD AUTO: 10 %
NEUTROPHILS # BLD AUTO: 5.7 10E3/UL (ref 1.6–8.3)
NEUTROPHILS NFR BLD AUTO: 72 %
NONHDLC SERPL-MCNC: 83 MG/DL
NRBC # BLD AUTO: 0 10E3/UL
NRBC BLD AUTO-RTO: 0 /100
PLATELET # BLD AUTO: 199 10E3/UL (ref 150–450)
POTASSIUM SERPL-SCNC: 4.3 MMOL/L (ref 3.4–5.3)
RBC # BLD AUTO: 4.7 10E6/UL (ref 4.4–5.9)
SODIUM SERPL-SCNC: 136 MMOL/L (ref 135–145)
TRIGL SERPL-MCNC: 89 MG/DL
TSH SERPL DL<=0.005 MIU/L-ACNC: 1.8 UIU/ML (ref 0.3–4.2)
WBC # BLD AUTO: 7.9 10E3/UL (ref 4–11)

## 2024-04-26 PROCEDURE — 80061 LIPID PANEL: CPT | Performed by: HOSPITALIST

## 2024-04-26 PROCEDURE — 36415 COLL VENOUS BLD VENIPUNCTURE: CPT | Performed by: PHYSICIAN ASSISTANT

## 2024-04-26 PROCEDURE — 72131 CT LUMBAR SPINE W/O DYE: CPT | Mod: MA

## 2024-04-26 PROCEDURE — 84443 ASSAY THYROID STIM HORMONE: CPT | Performed by: HOSPITALIST

## 2024-04-26 PROCEDURE — 96360 HYDRATION IV INFUSION INIT: CPT

## 2024-04-26 PROCEDURE — 85049 AUTOMATED PLATELET COUNT: CPT | Performed by: PHYSICIAN ASSISTANT

## 2024-04-26 PROCEDURE — G0378 HOSPITAL OBSERVATION PER HR: HCPCS

## 2024-04-26 PROCEDURE — 258N000003 HC RX IP 258 OP 636: Performed by: PHYSICIAN ASSISTANT

## 2024-04-26 PROCEDURE — 258N000003 HC RX IP 258 OP 636: Performed by: HOSPITALIST

## 2024-04-26 PROCEDURE — 250N000013 HC RX MED GY IP 250 OP 250 PS 637: Performed by: HOSPITALIST

## 2024-04-26 PROCEDURE — 96361 HYDRATE IV INFUSION ADD-ON: CPT

## 2024-04-26 PROCEDURE — 99223 1ST HOSP IP/OBS HIGH 75: CPT | Mod: AI | Performed by: HOSPITALIST

## 2024-04-26 PROCEDURE — 72192 CT PELVIS W/O DYE: CPT | Mod: MA

## 2024-04-26 PROCEDURE — 82374 ASSAY BLOOD CARBON DIOXIDE: CPT | Performed by: PHYSICIAN ASSISTANT

## 2024-04-26 PROCEDURE — 99285 EMERGENCY DEPT VISIT HI MDM: CPT | Mod: 25

## 2024-04-26 RX ORDER — POTASSIUM CHLORIDE 1500 MG/1
20 TABLET, EXTENDED RELEASE ORAL 2 TIMES DAILY
Status: DISCONTINUED | OUTPATIENT
Start: 2024-04-26 | End: 2024-04-28 | Stop reason: HOSPADM

## 2024-04-26 RX ORDER — TAMSULOSIN HYDROCHLORIDE 0.4 MG/1
0.4 CAPSULE ORAL DAILY
Status: DISCONTINUED | OUTPATIENT
Start: 2024-04-26 | End: 2024-04-26

## 2024-04-26 RX ORDER — HYDROMORPHONE HYDROCHLORIDE 2 MG/1
2 TABLET ORAL
Status: DISCONTINUED | OUTPATIENT
Start: 2024-04-26 | End: 2024-04-28 | Stop reason: HOSPADM

## 2024-04-26 RX ORDER — AMOXICILLIN 250 MG
1 CAPSULE ORAL 2 TIMES DAILY PRN
Status: DISCONTINUED | OUTPATIENT
Start: 2024-04-26 | End: 2024-04-28 | Stop reason: HOSPADM

## 2024-04-26 RX ORDER — NALOXONE HYDROCHLORIDE 0.4 MG/ML
0.2 INJECTION, SOLUTION INTRAMUSCULAR; INTRAVENOUS; SUBCUTANEOUS
Status: DISCONTINUED | OUTPATIENT
Start: 2024-04-26 | End: 2024-04-28 | Stop reason: HOSPADM

## 2024-04-26 RX ORDER — POLYETHYLENE GLYCOL 3350 17 G/17G
17 POWDER, FOR SOLUTION ORAL DAILY
Status: DISCONTINUED | OUTPATIENT
Start: 2024-04-27 | End: 2024-04-28 | Stop reason: HOSPADM

## 2024-04-26 RX ORDER — IPRATROPIUM BROMIDE AND ALBUTEROL SULFATE 2.5; .5 MG/3ML; MG/3ML
3 SOLUTION RESPIRATORY (INHALATION) 4 TIMES DAILY
Status: DISCONTINUED | OUTPATIENT
Start: 2024-04-26 | End: 2024-04-28 | Stop reason: HOSPADM

## 2024-04-26 RX ORDER — ONDANSETRON 2 MG/ML
4 INJECTION INTRAMUSCULAR; INTRAVENOUS EVERY 6 HOURS PRN
Status: DISCONTINUED | OUTPATIENT
Start: 2024-04-26 | End: 2024-04-28 | Stop reason: HOSPADM

## 2024-04-26 RX ORDER — AMOXICILLIN 250 MG
1 CAPSULE ORAL 2 TIMES DAILY
Status: DISCONTINUED | OUTPATIENT
Start: 2024-04-26 | End: 2024-04-28 | Stop reason: HOSPADM

## 2024-04-26 RX ORDER — SULFAMETHOXAZOLE/TRIMETHOPRIM 800-160 MG
1 TABLET ORAL 2 TIMES DAILY
Status: ON HOLD | COMMUNITY
Start: 2024-04-22 | End: 2024-04-28

## 2024-04-26 RX ORDER — DULOXETIN HYDROCHLORIDE 20 MG/1
20 CAPSULE, DELAYED RELEASE ORAL 2 TIMES DAILY
Status: DISCONTINUED | OUTPATIENT
Start: 2024-04-26 | End: 2024-04-26

## 2024-04-26 RX ORDER — SODIUM CHLORIDE 9 MG/ML
INJECTION, SOLUTION INTRAVENOUS CONTINUOUS
Status: DISCONTINUED | OUTPATIENT
Start: 2024-04-26 | End: 2024-04-28 | Stop reason: HOSPADM

## 2024-04-26 RX ORDER — PREDNISONE 1 MG/1
2 TABLET ORAL DAILY
Status: DISCONTINUED | OUTPATIENT
Start: 2024-04-27 | End: 2024-04-28 | Stop reason: HOSPADM

## 2024-04-26 RX ORDER — NALOXONE HYDROCHLORIDE 0.4 MG/ML
0.4 INJECTION, SOLUTION INTRAMUSCULAR; INTRAVENOUS; SUBCUTANEOUS
Status: DISCONTINUED | OUTPATIENT
Start: 2024-04-26 | End: 2024-04-28 | Stop reason: HOSPADM

## 2024-04-26 RX ORDER — NITROGLYCERIN 0.4 MG/1
0.4 TABLET SUBLINGUAL EVERY 5 MIN PRN
Status: DISCONTINUED | OUTPATIENT
Start: 2024-04-26 | End: 2024-04-28 | Stop reason: HOSPADM

## 2024-04-26 RX ORDER — ROSUVASTATIN CALCIUM 10 MG/1
10 TABLET, COATED ORAL DAILY
Status: DISCONTINUED | OUTPATIENT
Start: 2024-04-26 | End: 2024-04-28 | Stop reason: HOSPADM

## 2024-04-26 RX ORDER — ONDANSETRON 4 MG/1
4 TABLET, ORALLY DISINTEGRATING ORAL EVERY 6 HOURS PRN
Status: DISCONTINUED | OUTPATIENT
Start: 2024-04-26 | End: 2024-04-28 | Stop reason: HOSPADM

## 2024-04-26 RX ORDER — SULFAMETHOXAZOLE/TRIMETHOPRIM 800-160 MG
1 TABLET ORAL 2 TIMES DAILY
Status: DISCONTINUED | OUTPATIENT
Start: 2024-04-26 | End: 2024-04-28 | Stop reason: HOSPADM

## 2024-04-26 RX ORDER — ROFLUMILAST 500 UG/1
500 TABLET ORAL DAILY
Status: DISCONTINUED | OUTPATIENT
Start: 2024-04-26 | End: 2024-04-26

## 2024-04-26 RX ORDER — TRAZODONE HYDROCHLORIDE 50 MG/1
50 TABLET, FILM COATED ORAL AT BEDTIME
Status: DISCONTINUED | OUTPATIENT
Start: 2024-04-26 | End: 2024-04-26

## 2024-04-26 RX ORDER — METOPROLOL SUCCINATE 50 MG/1
50 TABLET, EXTENDED RELEASE ORAL EVERY EVENING
Status: DISCONTINUED | OUTPATIENT
Start: 2024-04-26 | End: 2024-04-28 | Stop reason: HOSPADM

## 2024-04-26 RX ORDER — OXYCODONE HYDROCHLORIDE 5 MG/1
5 TABLET ORAL EVERY 8 HOURS PRN
Status: DISCONTINUED | OUTPATIENT
Start: 2024-04-26 | End: 2024-04-26

## 2024-04-26 RX ORDER — TRAZODONE HYDROCHLORIDE 50 MG/1
50 TABLET, FILM COATED ORAL AT BEDTIME
Status: DISCONTINUED | OUTPATIENT
Start: 2024-04-26 | End: 2024-04-28 | Stop reason: HOSPADM

## 2024-04-26 RX ORDER — FLUTICASONE FUROATE AND VILANTEROL 200; 25 UG/1; UG/1
1 POWDER RESPIRATORY (INHALATION) DAILY
Status: DISCONTINUED | OUTPATIENT
Start: 2024-04-26 | End: 2024-04-26

## 2024-04-26 RX ORDER — ASPIRIN 81 MG/1
81 TABLET ORAL 2 TIMES DAILY
Status: DISCONTINUED | OUTPATIENT
Start: 2024-04-26 | End: 2024-04-28 | Stop reason: HOSPADM

## 2024-04-26 RX ORDER — ACETAMINOPHEN 500 MG
1000 TABLET ORAL EVERY 8 HOURS PRN
Status: DISCONTINUED | OUTPATIENT
Start: 2024-04-26 | End: 2024-04-28

## 2024-04-26 RX ORDER — HYDROMORPHONE HYDROCHLORIDE 1 MG/ML
0.2 INJECTION, SOLUTION INTRAMUSCULAR; INTRAVENOUS; SUBCUTANEOUS
Status: DISCONTINUED | OUTPATIENT
Start: 2024-04-26 | End: 2024-04-28 | Stop reason: HOSPADM

## 2024-04-26 RX ORDER — CARBOXYMETHYLCELLULOSE SODIUM 5 MG/ML
1 SOLUTION/ DROPS OPHTHALMIC
Status: DISCONTINUED | OUTPATIENT
Start: 2024-04-26 | End: 2024-04-28 | Stop reason: HOSPADM

## 2024-04-26 RX ORDER — CYCLOBENZAPRINE HCL 10 MG
10 TABLET ORAL
Status: DISCONTINUED | OUTPATIENT
Start: 2024-04-26 | End: 2024-04-27

## 2024-04-26 RX ORDER — AMLODIPINE BESYLATE 5 MG/1
5 TABLET ORAL AT BEDTIME
Status: DISCONTINUED | OUTPATIENT
Start: 2024-04-26 | End: 2024-04-28 | Stop reason: HOSPADM

## 2024-04-26 RX ORDER — LEVOTHYROXINE SODIUM 125 UG/1
125 TABLET ORAL DAILY
Status: DISCONTINUED | OUTPATIENT
Start: 2024-04-27 | End: 2024-04-28 | Stop reason: HOSPADM

## 2024-04-26 RX ORDER — AMOXICILLIN 250 MG
2 CAPSULE ORAL 2 TIMES DAILY
Status: DISCONTINUED | OUTPATIENT
Start: 2024-04-26 | End: 2024-04-28 | Stop reason: HOSPADM

## 2024-04-26 RX ORDER — AMOXICILLIN 250 MG
2 CAPSULE ORAL 2 TIMES DAILY PRN
Status: DISCONTINUED | OUTPATIENT
Start: 2024-04-26 | End: 2024-04-28 | Stop reason: HOSPADM

## 2024-04-26 RX ADMIN — GABAPENTIN 400 MG: 300 CAPSULE ORAL at 22:29

## 2024-04-26 RX ADMIN — CYCLOBENZAPRINE 10 MG: 10 TABLET, FILM COATED ORAL at 22:29

## 2024-04-26 RX ADMIN — SULFAMETHOXAZOLE AND TRIMETHOPRIM 1 TABLET: 800; 160 TABLET ORAL at 22:36

## 2024-04-26 RX ADMIN — TRAZODONE HYDROCHLORIDE 25 MG: 50 TABLET ORAL at 22:30

## 2024-04-26 RX ADMIN — SODIUM CHLORIDE 1000 ML: 9 INJECTION, SOLUTION INTRAVENOUS at 19:19

## 2024-04-26 RX ADMIN — POTASSIUM CHLORIDE 20 MEQ: 1500 TABLET, EXTENDED RELEASE ORAL at 22:30

## 2024-04-26 RX ADMIN — HYDROMORPHONE HYDROCHLORIDE 2 MG: 2 TABLET ORAL at 22:29

## 2024-04-26 RX ADMIN — OXYCODONE HYDROCHLORIDE 5 MG: 5 TABLET ORAL at 19:19

## 2024-04-26 RX ADMIN — ROSUVASTATIN CALCIUM 10 MG: 10 TABLET, FILM COATED ORAL at 22:31

## 2024-04-26 RX ADMIN — ASPIRIN 81 MG: 81 TABLET, COATED ORAL at 22:29

## 2024-04-26 RX ADMIN — SODIUM CHLORIDE: 9 INJECTION, SOLUTION INTRAVENOUS at 21:51

## 2024-04-26 ASSESSMENT — ACTIVITIES OF DAILY LIVING (ADL)
ADLS_ACUITY_SCORE: 37
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
## 2024-04-26 NOTE — ED TRIAGE NOTES
Presents with left hip pain after a fall 1 week ago. Was seen at [facility] last week day of fall. Had an outpatient hip ct scan yesterday through primary care provider. Not able to ambulate in daughters living environment.

## 2024-04-26 NOTE — ED NOTES
"[facility]  ED Nurse Handoff Report    ED Chief complaint: Hip Pain      ED Diagnosis:   Final diagnoses:   None       Code Status: MD dimple bernardo    Allergies:   Allergies   Allergen Reactions    Levaquin Difficulty breathing    Atorvastatin Calcium      Other reaction(s): Cramps   lipitor    Cats     Clopidogrel Bisulfate     Dogs     Hctz [Hydrochlorothiazide]      Rash on legs    Levofloxacin     Sulfasalazine Other (See Comments)     Stomach cramps        Patient Story: hip pain  Focused Assessment:  Patient had outpatient CT scan done showed  pelvic and hip fracture prompted to come to ED for further evaluation. Patient having difficulty caring for self.     Treatments and/or interventions provided: See MAR  Patient's response to treatments and/or interventions: TBD    To be done/followed up on inpatient unit:  close monitoring and pain control    Does this patient have any cognitive concerns?:  na    Activity level - Baseline/Home:  Independent  Activity Level - Current:    pending evaluation    Patient's Preferred language: English   Needed?: No    Isolation: None  Infection: Not Applicable  Patient tested for COVID 19 prior to admission: NO  Bariatric?: No    Vital Signs:   Vitals:    04/26/24 1410   BP: 112/60   Pulse: 79   Resp: 20   Temp: 98.2  F (36.8  C)   TempSrc: Oral   SpO2: 95%   Weight: 79.4 kg (175 lb)   Height: 1.778 m (5' 10\")       Cardiac Rhythm:     Was the PSS-3 completed:   Yes  What interventions are required if any?               Family Comments: family at bedside  OBS brochure/video discussed/provided to patient/family: Yes              Name of person given brochure if not patient: na              Relationship to patient: na    For the majority of the shift this patient's behavior was Green.   Behavioral interventions performed were none.    ED NURSE PHONE NUMBER: *[id number]         "

## 2024-04-26 NOTE — ED PROVIDER NOTES
"    History     Chief Complaint:  Hip Pain       HPI   Harrison Thomas is a 78 year old male with a history of CAD, COPD, hypertension, hyperlipidemia, and TIA who presents to the ED with his daughter for evaluation of hip pain. The patient states he was brought in today by his daughter due to severe left hip and proximal femur pain that is exacerbated by movement and accompanied by some numbness in his distal left leg . States he had a fall on 4/16/24 that led to a hospital admission where imaging was done, nothing was found, and he was discharged to home with pain medication. Since, he has been unable to walk much even with a walker due to the pain. Notes his left leg \"drags\" while walking and he is unable to get up or down stairs. He has not taken any medication today for the pain and states the oxycodone given with discharge has not provided relief. Denies new back pain, numbness in his groin, urinary or bowel incontinence, neck pain, chest pain, shortness of breath, abdominal pain, vision changes, headaches, nausea, vomiting, upper extremity pain, right leg pain, and recent falls.    Independent Historian:    None    Review of External Notes:  CT Femur Thigh left w/o contrast obtained yesterday (4/25/24):  IMPRESSION:   1. Mildly displaced inferior pubic ramus comminuted fracture.  2. Suspect non-displaced pubic root fracture though beam streak  artifact limits evaluation.     [facility] Med visit yesterday (4/25/24):  Harrison Thomas is a 78 year old male presenting to clinic today. Today, the patient reports that he had a fall about 2 weeks ago, trying to get out of the car when it was raining. He is unsure if the door pushed him or if his walking stick slipped. He fell onto the pavement, he was seen in the ED on 4/17/24 any they obtained XR that revealed no fractures. He is having more difficulties getting around since his fall. His pain is located over the thigh. Pain is worse with weightbearing " activities.         ASSESSMENT and PLAN:      Harrison was seen today for pain.     Diagnoses and all orders for this visit:     Injury of left hip, subsequent encounter  -     Orthopedic  Referral  -     CT Femur Thigh Left w/o Contrast; Future  -     Orthopedic  Referral; Future        78-year-old male presenting to clinic today with left hip/anterior thigh pain after a fall.  Patient did have a plethora of images from April 17 which were all relatively negative.  A CT of his thigh was also ordered at that time, but the patient did not get it.  Given the lack of field-of-view from the x-rays going into the area of tenderness on the thigh, I do think a stat CT of the femur is reasonable.  This has been ordered.  Given the fact the patient has had difficulty with his hip replacement/partial since the time he had it, and is reproducible inguinal crease/joint line pain with examination, I have placed a referral to one of our orthopedic hip surgeons for overall discussion.  Patient was made aware that he needs to have a second surgery on his hip, because of that this order has also been placed.  All the patient's questions have been answered.  We will reach out to the patient with any urgent findings that are found on the stat CT.  Patient lives in [place], so him getting back to our site may be a bit difficult.  ER and urgent care precautions have been discussed in great detail.  Return precautions also advised.  In the meantime, I do want the patient using some form of an assistive device with any ambulation.  At home he has a walker, which she did not bring today.  I have counseled him on using this at all times until the CT results have been followed up with him.    [place] Hospitalization from 4/17/24-4/19/24  Hospital Course   HPI:  Harrison Thomas is a 78 y.o. male with a past medical history significant for CAD, COPD, HTN, thyroid and lung cancer in the past, currently not on chemorx or  "radiation who presents with a mechanical fall.    This occurred yesterday, he was getting out of a car, the wind blew the door back into him and he fell on the pavement impacting his left side and hit his head. He was able to get up with some help but was sore. This am, when he woke up he very even more sore and could not move his left side due to pain alice in his back which has bothered for years anyway.  He was brought to ED, trauma workup is negative. No fractures or dislocation, he denies HA, nausea, visual disturbance or symptoms of concussion.  We are asked to admit as he is unable to ambulate due to severe pain. He refused oxycodone because it does \"not work for me\" but after discussion about multimodal analgesic mnx he is more open to this.  He has a hx of chronic pain syndrome on gabapentin, flexeril and oxycodone.    Brief Summary:  Admitted with recurrent mechanical falls due to generalized weakness and chronic low back pain. Recent hx of right olecranon bursitis that was infected after a fall. S/p I&D on 4/7 and completed course of antibiotics with improvement but continued effusion. Does not appear to be acutely infected.   PT/OT worked with pt and recommended home health PT/OT/RN/SW, which was ordered.  Medication regimen changes: D/c on Flexeril prn and Oxycodone prn X 10 tabs.  Special considerations: adherence concerns: Pt with social stressors/divorce proceedings. Unable to stay with ex-wife or daughter.    Problems in Detail    Active Problems:  Principal Problem:  Fall due to generalized weakness, chronic back pain  - mechanical fall  - uses a walking stick at baseline and occasionally a walker  - no trauma  - likely MSK pain  - tylenol, robazine and oxycodone TID together  - continued gabapentin and d/c on flexeril  - PT/OT/SW rec d/c home with home health PT/OT/RN/SW, ordered    Right elbow olecranon bursitis/effusion  Right elbow pain and swelling after falling on right elbow, which have been " "decreasing since it was drained on 4/7/18.   - Pt completed po abx (doxycycline and then cephalexin) x 10 days  4/7 fluid Cx grew 1+ Staph lugdenensis, R- clinday, erythro, o/w sensitive  - Fluid and fluctuance remains but does not appear actively infected  - Recheck right elbow xray shows soft tissue swelling over the olecranon and no joint effusion.   ______    XR Femur left on 4/17/24  Left femoral hemiarthroplasty. No fracture or dislocation. No loosening. Mild medial compartment narrowing in the left knee.     XR Pelvis on 4/17/24  No fracture. Right total hip arthroplasty. Left femoral hemiarthroplasty.     Medications:    Albuterol  Fosamax  Amlodipine  Aspirin 81 mg  Ascorbic acid  Cephalexin  Flexeril  Duloxetine  Fluticasone  Trelegy ellipta  Lasix  Gabapentin  Duoneb  Klor-con  Levothyroxine  Metoprolol succinate  Nitroglycerin  Oxycodone  Prednisone  Rosuvastatin  Tamsulosin  Levalbuterol  Roflumilast   Trazodone   Azithromycin  Bactrim     Past Medical History:    CAD  Depression  Insomnia  Mediastinal lymphadenopathy  Papillary thyroid cancer  COPD  HTN  Hyperlipidemia  Malignant neoplasm to lung  BRENNEN  Chronic respiratory failure with hypoxia  SAH  CALLUM  Chronic pain disorder  Substance abuse  Immunodeficiency  Inguinal hernia (L)  Occipital neuralgia  Thyrotoxicosis   TIA    Past Surgical History:    Hip arthroplasty (B)  Bronchoscopy  Cardiac stent placement  Cataract extraction  Retina repair  Angioplasty  Inguinal herniorrhaphy  Prostate enucleation  Meniscus repair (R)  Thyroidectomy  Liver lobectomy    Physical Exam   Patient Vitals for the past 24 hrs:   BP Temp Temp src Pulse Resp SpO2 Height Weight   04/26/24 1410 112/60 98.2  F (36.8  C) Oral 79 20 95 % 1.778 m (5' 10\") 79.4 kg (175 lb)        Physical Exam  Constitutional: Pleasant. Cooperative.   Eyes: Pupils equally round and reactive  HENT: Head is normal in appearance. Oropharynx is normal with moist mucus membranes.  Cardiovascular: " Regular rate and rhythm and without murmurs.  Respiratory: Normal respiratory effort, lungs are clear bilaterally.  GI: Abdomen is soft, non-tender, non-distended. No guarding, rebound, or rigidity.  Musculoskeletal: TTP to left hip. Able to range left hip, however pain at hip with movement. No TTP to midline spine.  Skin: Normal, without rash.  Neurologic: Cranial nerves grossly intact, normal cognition, no focal deficits. Alert and oriented x 3.   Psychiatric: Normal affect.  Nursing notes and vital signs reviewed.      Emergency Department Course     Labs:  Labs Ordered and Resulted from Time of ED Arrival to Time of ED Departure   BASIC METABOLIC PANEL - Abnormal       Result Value    Sodium 136      Potassium 4.3      Chloride 101      Carbon Dioxide (CO2) 26      Anion Gap 9      Urea Nitrogen 18.7      Creatinine 1.31 (*)     GFR Estimate 56 (*)     Calcium 9.1      Glucose 84     CBC WITH PLATELETS AND DIFFERENTIAL    WBC Count 7.9      RBC Count 4.70      Hemoglobin 13.7      Hematocrit 42.0      MCV 89      MCH 29.1      MCHC 32.6      RDW 13.5      Platelet Count 199      % Neutrophils 72      % Lymphocytes 15      % Monocytes 10      % Eosinophils 2      % Basophils 1      % Immature Granulocytes 0      NRBCs per 100 WBC 0      Absolute Neutrophils 5.7      Absolute Lymphocytes 1.2      Absolute Monocytes 0.8      Absolute Eosinophils 0.2      Absolute Basophils 0.1      Absolute Immature Granulocytes 0.0      Absolute NRBCs 0.0         Imaging:  CT Pelvis Bone wo Contrast   Final Result   IMPRESSION:   1.  Nondisplaced fractures of the left pubic rami.   2.  Postoperative changes bilateral total hip arthroplasty. Components appear well seated bilaterally.   3.  Chronic irregularity along the anterior aspect of the left intertrochanteric region. This could be related to old injury, but this is not an acute finding.   4.  Incompletely visualized benign-appearing intramuscular lipoma within the right  lateral oblique musculature of the abdomen.   5.  Intrapelvic contents negative.      CT Lumbar Spine w/o Contrast   Final Result   IMPRESSION:   1.  No acute fracture or posttraumatic subluxation.   2.  Chronic moderate compression deformity of L2.          Emergency Department Course & Assessments:    Interventions:  Medications   sodium chloride 0.9% BOLUS 1,000 mL (has no administration in time range)   acetaminophen (TYLENOL) tablet 1,000 mg (has no administration in time range)   amLODIPine (NORVASC) tablet 5 mg (has no administration in time range)   aspirin EC tablet 81 mg (has no administration in time range)   cyclobenzaprine (FLEXERIL) tablet 10 mg (has no administration in time range)   DULoxetine (CYMBALTA) DR capsule 20 mg (has no administration in time range)   fluticasone (ARNUITY ELLIPTA) 100 MCG/ACT inhaler 1 puff (has no administration in time range)   fluticasone-vilanterol (BREO ELLIPTA) 200-25 MCG/ACT inhaler 1 puff (has no administration in time range)     And   umeclidinium (INCRUSE ELLIPTA) 62.5 MCG/ACT inhaler 1 puff (has no administration in time range)   gabapentin (NEURONTIN) capsule 400 mg (has no administration in time range)   ipratropium - albuterol 0.5 mg/2.5 mg/3 mL (DUONEB) neb solution 3 mL (has no administration in time range)   potassium chloride santo ER (KLOR-CON M20) CR tablet 20 mEq (has no administration in time range)   levothyroxine (SYNTHROID/LEVOTHROID) tablet 125 mcg (has no administration in time range)   metoprolol succinate ER (TOPROL XL) 24 hr tablet 50 mg (has no administration in time range)   nitroGLYcerin (NITROSTAT) sublingual tablet 0.4 mg (has no administration in time range)   oxyCODONE (ROXICODONE) tablet 5 mg (has no administration in time range)   polyethylene glycol (MIRALAX) powder 17 g (has no administration in time range)   predniSONE (DELTASONE) tablet 2 mg (has no administration in time range)   roflumilast (DALIRESP) tablet 500 mcg (has no  administration in time range)   rosuvastatin (CRESTOR) tablet 10 mg (has no administration in time range)   tamsulosin (FLOMAX) capsule 0.4 mg (has no administration in time range)   traZODone (DESYREL) tablet 50 mg (has no administration in time range)        Assessments:  1704 I obtained history and performed a physical exam as noted above.     Independent Interpretation (X-rays, CTs, rhythm strip):  None    Consultations/Discussion of Management or Tests:  1833 Case discussed with Dr. Quintero, hospitalist.  1859 Case discussed with Dr. Vargas of orthopedic medicine. Patient can be weightbearing as tolerated, anticipate no surgery, PT and OT recommended. Patient to be evaluated tomorrow.       Social Determinants of Health affecting care:  None     Disposition:  The patient was admitted to the hospital under the care of Dr. Quintero.    Impression & Plan         Medical Decision Making:  Harrison Thomas is a 78 year old male who presents to ED for evaluation of ongoing hip pain.  Patient had a fall about 10 days ago and was ultimately admitted to [facility] for pain.  He was discharged back to home, however continues to have intractable pain, prompting presentation to the ED.  Patient did have CT of femur obtained yesterday at outpatient visit with sports medicine which showed pelvis fracture.  Patient has had no recurrent falls.  He is having difficulty managing his pain and difficulty with caring for himself at home.  See HPI as above for additional details.  Vitals and physical exam as above.  CTs of pelvis and lumbar spine as above showed nondisplaced fractures of the left pubic rami.  See the remainder of imaging findings as above.  Do feel admission is indicated for patient given intractable pain and difficulty caring for self in setting of pelvic fracture.  Patient agreement for admission.  Case discussed with hospitalist, agreed to admit the patient.  Case discussed with ortho, plan to be  weightbearing as tolerated, unlikely surgical, plan to see patient tomorrow. Of note, lab work does show mild CALLUM, gentle fluids initiated down in ED. All questions answered.  Patient admitted in stable condition.    Diagnosis:    ICD-10-CM    1. Closed nondisplaced fracture of pelvis, unspecified part of pelvis, initial encounter (H)  S32.9XXA       2. Fall, initial encounter  W19.XXXA       3. CALLUM (acute kidney injury) (H24)  N17.9     mild           Discharge Medications:  New Prescriptions    No medications on file          Scribe Disclosure:  I, Safia Taylor, am serving as a scribe at 5:23 PM on 4/26/2024 to document services personally performed by Alexandro Andrew PA-C based on my observations and the provider's statements to me.    4/26/2024   Alexandro Andrew PA-C     This record was created at least in part using electronic voice recognition software, so please excuse any typographical errors.           Alexandro Andrew PA-C  04/26/24 2734

## 2024-04-27 ENCOUNTER — APPOINTMENT (OUTPATIENT)
Dept: PHYSICAL THERAPY | Facility: CLINIC | Age: 79
End: 2024-04-27
Attending: HOSPITALIST
Payer: MEDICARE

## 2024-04-27 LAB
ALBUMIN UR-MCNC: NEGATIVE MG/DL
ANION GAP SERPL CALCULATED.3IONS-SCNC: 12 MMOL/L (ref 7–15)
APPEARANCE UR: CLEAR
BILIRUB UR QL STRIP: NEGATIVE
BUN SERPL-MCNC: 14.1 MG/DL (ref 8–23)
CALCIUM SERPL-MCNC: 8.6 MG/DL (ref 8.8–10.2)
CHLORIDE SERPL-SCNC: 105 MMOL/L (ref 98–107)
COLOR UR AUTO: ABNORMAL
CREAT SERPL-MCNC: 1.06 MG/DL (ref 0.67–1.17)
DEPRECATED HCO3 PLAS-SCNC: 23 MMOL/L (ref 22–29)
EGFRCR SERPLBLD CKD-EPI 2021: 73 ML/MIN/1.73M2
ERYTHROCYTE [DISTWIDTH] IN BLOOD BY AUTOMATED COUNT: 13.2 % (ref 10–15)
GLUCOSE SERPL-MCNC: 85 MG/DL (ref 70–99)
GLUCOSE UR STRIP-MCNC: NEGATIVE MG/DL
HCT VFR BLD AUTO: 42.3 % (ref 40–53)
HGB BLD-MCNC: 13.8 G/DL (ref 13.3–17.7)
HGB UR QL STRIP: NEGATIVE
KETONES UR STRIP-MCNC: ABNORMAL MG/DL
LEUKOCYTE ESTERASE UR QL STRIP: NEGATIVE
MCH RBC QN AUTO: 29.2 PG (ref 26.5–33)
MCHC RBC AUTO-ENTMCNC: 32.6 G/DL (ref 31.5–36.5)
MCV RBC AUTO: 90 FL (ref 78–100)
NITRATE UR QL: NEGATIVE
PH UR STRIP: 6.5 [PH] (ref 5–7)
PLATELET # BLD AUTO: 202 10E3/UL (ref 150–450)
POTASSIUM SERPL-SCNC: 4.6 MMOL/L (ref 3.4–5.3)
RBC # BLD AUTO: 4.72 10E6/UL (ref 4.4–5.9)
RBC URINE: 1 /HPF
SODIUM SERPL-SCNC: 140 MMOL/L (ref 135–145)
SP GR UR STRIP: 1.02 (ref 1–1.03)
SQUAMOUS EPITHELIAL: <1 /HPF
UROBILINOGEN UR STRIP-MCNC: NORMAL MG/DL
WBC # BLD AUTO: 6.7 10E3/UL (ref 4–11)
WBC URINE: 0 /HPF

## 2024-04-27 PROCEDURE — 250N000013 HC RX MED GY IP 250 OP 250 PS 637: Performed by: HOSPITALIST

## 2024-04-27 PROCEDURE — 97116 GAIT TRAINING THERAPY: CPT | Mod: GP

## 2024-04-27 PROCEDURE — 99232 SBSQ HOSP IP/OBS MODERATE 35: CPT | Performed by: HOSPITALIST

## 2024-04-27 PROCEDURE — G0378 HOSPITAL OBSERVATION PER HR: HCPCS

## 2024-04-27 PROCEDURE — 250N000012 HC RX MED GY IP 250 OP 636 PS 637: Performed by: HOSPITALIST

## 2024-04-27 PROCEDURE — 258N000003 HC RX IP 258 OP 636: Performed by: HOSPITALIST

## 2024-04-27 PROCEDURE — 81001 URINALYSIS AUTO W/SCOPE: CPT | Performed by: HOSPITALIST

## 2024-04-27 PROCEDURE — 250N000009 HC RX 250: Performed by: HOSPITALIST

## 2024-04-27 PROCEDURE — 94640 AIRWAY INHALATION TREATMENT: CPT

## 2024-04-27 PROCEDURE — 96361 HYDRATE IV INFUSION ADD-ON: CPT

## 2024-04-27 PROCEDURE — 85027 COMPLETE CBC AUTOMATED: CPT | Performed by: HOSPITALIST

## 2024-04-27 PROCEDURE — 97530 THERAPEUTIC ACTIVITIES: CPT | Mod: GP

## 2024-04-27 PROCEDURE — 80048 BASIC METABOLIC PNL TOTAL CA: CPT | Performed by: HOSPITALIST

## 2024-04-27 PROCEDURE — 999N000157 HC STATISTIC RCP TIME EA 10 MIN

## 2024-04-27 PROCEDURE — 97161 PT EVAL LOW COMPLEX 20 MIN: CPT | Mod: GP

## 2024-04-27 PROCEDURE — 250N000013 HC RX MED GY IP 250 OP 250 PS 637: Performed by: PHYSICIAN ASSISTANT

## 2024-04-27 PROCEDURE — 36415 COLL VENOUS BLD VENIPUNCTURE: CPT | Performed by: HOSPITALIST

## 2024-04-27 RX ORDER — METHOCARBAMOL 500 MG/1
500 TABLET, FILM COATED ORAL 4 TIMES DAILY
Status: DISCONTINUED | OUTPATIENT
Start: 2024-04-27 | End: 2024-04-28 | Stop reason: HOSPADM

## 2024-04-27 RX ORDER — HYDRALAZINE HYDROCHLORIDE 20 MG/ML
10 INJECTION INTRAMUSCULAR; INTRAVENOUS EVERY 4 HOURS PRN
Status: DISCONTINUED | OUTPATIENT
Start: 2024-04-27 | End: 2024-04-28 | Stop reason: HOSPADM

## 2024-04-27 RX ORDER — CYCLOBENZAPRINE HCL 10 MG
10 TABLET ORAL EVERY 8 HOURS PRN
Status: DISCONTINUED | OUTPATIENT
Start: 2024-04-27 | End: 2024-04-28 | Stop reason: HOSPADM

## 2024-04-27 RX ADMIN — HYDROMORPHONE HYDROCHLORIDE 2 MG: 2 TABLET ORAL at 19:46

## 2024-04-27 RX ADMIN — ASPIRIN 81 MG: 81 TABLET, COATED ORAL at 08:08

## 2024-04-27 RX ADMIN — SODIUM CHLORIDE: 9 INJECTION, SOLUTION INTRAVENOUS at 16:20

## 2024-04-27 RX ADMIN — IPRATROPIUM BROMIDE AND ALBUTEROL SULFATE 3 ML: .5; 3 SOLUTION RESPIRATORY (INHALATION) at 12:35

## 2024-04-27 RX ADMIN — SENNOSIDES AND DOCUSATE SODIUM 1 TABLET: 50; 8.6 TABLET ORAL at 19:40

## 2024-04-27 RX ADMIN — POTASSIUM CHLORIDE 20 MEQ: 1500 TABLET, EXTENDED RELEASE ORAL at 19:40

## 2024-04-27 RX ADMIN — LEVOTHYROXINE SODIUM 125 MCG: 125 TABLET ORAL at 08:08

## 2024-04-27 RX ADMIN — ROSUVASTATIN CALCIUM 10 MG: 10 TABLET, FILM COATED ORAL at 22:28

## 2024-04-27 RX ADMIN — SODIUM CHLORIDE: 9 INJECTION, SOLUTION INTRAVENOUS at 05:58

## 2024-04-27 RX ADMIN — SULFAMETHOXAZOLE AND TRIMETHOPRIM 1 TABLET: 800; 160 TABLET ORAL at 19:40

## 2024-04-27 RX ADMIN — METHOCARBAMOL 500 MG: 500 TABLET ORAL at 16:44

## 2024-04-27 RX ADMIN — SENNOSIDES AND DOCUSATE SODIUM 1 TABLET: 50; 8.6 TABLET ORAL at 08:07

## 2024-04-27 RX ADMIN — TRAZODONE HYDROCHLORIDE 25 MG: 50 TABLET ORAL at 22:27

## 2024-04-27 RX ADMIN — ASPIRIN 81 MG: 81 TABLET, COATED ORAL at 19:41

## 2024-04-27 RX ADMIN — METOPROLOL SUCCINATE 50 MG: 50 TABLET, FILM COATED, EXTENDED RELEASE ORAL at 19:40

## 2024-04-27 RX ADMIN — IPRATROPIUM BROMIDE AND ALBUTEROL SULFATE 3 ML: .5; 3 SOLUTION RESPIRATORY (INHALATION) at 08:34

## 2024-04-27 RX ADMIN — SULFAMETHOXAZOLE AND TRIMETHOPRIM 1 TABLET: 800; 160 TABLET ORAL at 08:08

## 2024-04-27 RX ADMIN — GABAPENTIN 400 MG: 300 CAPSULE ORAL at 19:40

## 2024-04-27 RX ADMIN — AMLODIPINE BESYLATE 5 MG: 5 TABLET ORAL at 22:28

## 2024-04-27 RX ADMIN — METHOCARBAMOL 500 MG: 500 TABLET ORAL at 12:49

## 2024-04-27 RX ADMIN — PREDNISONE 2 MG: 1 TABLET ORAL at 08:07

## 2024-04-27 RX ADMIN — POTASSIUM CHLORIDE 20 MEQ: 1500 TABLET, EXTENDED RELEASE ORAL at 08:08

## 2024-04-27 RX ADMIN — HYDROMORPHONE HYDROCHLORIDE 2 MG: 2 TABLET ORAL at 05:58

## 2024-04-27 RX ADMIN — GABAPENTIN 400 MG: 300 CAPSULE ORAL at 08:07

## 2024-04-27 RX ADMIN — IPRATROPIUM BROMIDE AND ALBUTEROL SULFATE 3 ML: .5; 3 SOLUTION RESPIRATORY (INHALATION) at 18:34

## 2024-04-27 RX ADMIN — GABAPENTIN 400 MG: 300 CAPSULE ORAL at 14:46

## 2024-04-27 RX ADMIN — METHOCARBAMOL 500 MG: 500 TABLET ORAL at 19:40

## 2024-04-27 ASSESSMENT — ACTIVITIES OF DAILY LIVING (ADL)
ADLS_ACUITY_SCORE: 41
ADLS_ACUITY_SCORE: 41
ADLS_ACUITY_SCORE: 40
ADLS_ACUITY_SCORE: 41
ADLS_ACUITY_SCORE: 41
ADLS_ACUITY_SCORE: 40
ADLS_ACUITY_SCORE: 41
ADLS_ACUITY_SCORE: 40
ADLS_ACUITY_SCORE: 41
ADLS_ACUITY_SCORE: 40
ADLS_ACUITY_SCORE: 41
ADLS_ACUITY_SCORE: 41
ADLS_ACUITY_SCORE: 40
ADLS_ACUITY_SCORE: 40
ADLS_ACUITY_SCORE: 41
ADLS_ACUITY_SCORE: 41
ADLS_ACUITY_SCORE: 40
ADLS_ACUITY_SCORE: 41
ADLS_ACUITY_SCORE: 40

## 2024-04-27 NOTE — PLAN OF CARE
Goal Outcome Evaluation:      Plan of Care Reviewed With: patient    Overall Patient Progress: improvingOverall Patient Progress: improving         PRIMARY Concern: Hip pain  SAFETY RISK Concerns (fall risk, behaviors, etc.): Fall          Isolation/Type: None  Tests/Procedures for NEXT shift: none  Consults? (Pending/following, signed-off?) ortho consult, Ot, PT, SW  Where is patient from? (Home, TCU, etc.): Home  Other Important info for NEXT shift: na  Anticipated DC date & active delays: possibly tomorrow to [facility]  _____________________________________________________________________________  SUMMARY NOTE:             Orientation/Cognitive: A&Ox4            Observation Goals (Met/ Not Met): Not Met  Mobility Level/Assist Equipment: A1/GB/W     Antibiotics & Plan (IV/po, length of tx left): BACTRIM DS  Pain Management: Robaxin  Tele/VS/O2: VSS/1L O2  ABNL Lab/BG: See Chart  Diet: Regular   Bowel/Bladder: Continent  Skin Concerns: Scattered Bruises & Scabs   Drains/Devices: PIV/ NS 100Ml/Hr     Patient Stated Goal for Today: good pain control            Observation goals  PRIOR TO DISCHARGE       Comments: -diagnostic tests and consults completed and resulted -Met  -vital signs normal or at patient baseline -Not Met  Nurse to notify provider when observation goals have been met and patient is ready for discharge.

## 2024-04-27 NOTE — PROGRESS NOTES
Observation goals  PRIOR TO DISCHARGE       Comments: -diagnostic tests and consults completed and resulted -Not Met  -vital signs normal or at patient baseline -Not Met  Nurse to notify provider when observation goals have been met and patient is ready for discharge.

## 2024-04-27 NOTE — PHARMACY-ADMISSION MEDICATION HISTORY
Pharmacist Admission Medication History    Admission medication history is complete. The information provided in this note is only as accurate as the sources available at the time of the update.    Information Source(s): Patient and CareEverywhere/SureScripts via in-person    Pertinent Information:   On 4/22/24, provider instructed patient to take Trelegy 100 mcg with Arnuity 100 mcg until done with Trelegy 100 mcg inhaler then switch to Trelegy 200 mcg alone. Provider never sent a prescription for Arnuity 100 mcg so never filled; has been using Trelegy 100 mcg alone.  Patient has historically taken azithromycin 500 mg every other day but is currently holding while on other antibiotics.  Patient recently filled pantoprazole 40 mg daily on 4/21/24 #90; states he was told not to take.    Changes made to PTA medication list:  Added: Bactrim  Deleted: cephalexin, duloxetine, Duoneb, levalbuterol, Miralax, tamsulosin, trazodone  Changed:   Cyclobenzaprine 10 mg HS prn -> 5 mg BID (new Rx for 5-10 mg TID prn)  Furosemide 20 mg alternating with 40 mg -> 20 mg daily  Gabapentin 400 mg TID -> 400 mg BID  Pt asked about increasing to three times daily for neuropathic pain; I discussed patient's renal function and possible effects in increasing dose - he would like to try increased dosing here to see how he tolerates.  Melatonin HS -> HS prn (hasn't used recently)    Allergies reviewed with patient and updates made in EHR: yes    Medication History Completed By: Michelle Smith RPH 4/26/2024 7:22 PM    PTA Med List   Medication Sig Note Last Dose    acetaminophen (TYLENOL) 500 MG tablet Take 1,000 mg by mouth every 8 hours as needed  prn    acetylcysteine (MUCOMYST) 20 % neb solution Take 2 mLs by nebulization 4 times daily 4/26/2024: No fill history, pt thinks he uses prn. prn at prn    albuterol (ACCUNEB) 1.25 MG/3ML neb solution Take 1 vial (1.25 mg) by nebulization every 4 hours as needed for shortness of breath or  wheezing 4/26/2024: No fill hx, pt thinks he uses prn prn at prn    albuterol (PROAIR HFA/PROVENTIL HFA/VENTOLIN HFA) 108 (90 Base) MCG/ACT inhaler INHALE 2 PUFFS BY MOUTH EVERY 6 HOURS AS NEEDED FOR WHEEZE OR FOR SHORTNESS OF BREATH 4/26/2024: No fill hx, pt thinks he uses prn prn at prn    alendronate (FOSAMAX) 70 MG tablet Take 1 tablet (70 mg) by mouth every 7 days (Patient taking differently: Take 70 mg by mouth every 7 days Mondays)  4/15/2024    amLODIPine (NORVASC) 5 MG tablet Take 1 tablet (5 mg) by mouth at bedtime  4/25/2024 at pm    ascorbic acid 1000 MG TABS tablet Take 2,000 mg by mouth daily  4/25/2024    aspirin 81 MG EC tablet Take 81 mg by mouth 2 times daily  4/26/2024 at am    calcium carbonate (OS-KARLIE) 1500 (600 Ca) MG tablet Take 600 mg by mouth daily  Possibly 4/26 at unsure    carboxymethylcellulose PF (REFRESH PLUS) 0.5 % ophthalmic solution Place 1 drop into both eyes every hour as needed for dry eyes  prn at prn    cholecalciferol 25 MCG (1000 UT) TABS Take 1,000 Units by mouth daily   4/26/2024 at am    cyclobenzaprine (FLEXERIL) 5 MG tablet Take 1 tablet (5 mg) by mouth nightly as needed for muscle spasms (Patient taking differently: Take 5 mg by mouth 2 times daily Rx written for 5-10 mg by mouth three times daily as needed.)  4/26/2024 at am    Fluticasone-Umeclidin-Vilant (TRELEGY ELLIPTA) 100-62.5-25 MCG/ACT oral inhaler Inhale 1 puff into the lungs daily  4/26/2024 at am    furosemide (LASIX) 20 MG tablet Take 1-2 tablets (20-40 mg) by mouth every other day Give 40 mg by mouth  every other day for COPD AND Give 20 mg by mouth very other day HTN, (Patient taking differently: Take 20 mg by mouth daily) 4/26/2024: Previously prescribed as 20 mg alternating with 40 mg; patient isn't sure, but he says he thinks he's taking 1 tablet a day and follows the bottle instructions and most recent fill was 4/18/24 #90 for 90 day supply, so likely taking 20 mg daily. 4/26/2024 at am    gabapentin  (NEURONTIN) 400 MG capsule Take 1 capsule (400 mg) by mouth 3 times daily (Patient taking differently: Take 400 mg by mouth 2 times daily)  4/26/2024 at am    hydrocortisone 2.5 % cream Apply topically 2 times daily as needed for itching  prn    KLOR-CON 20 MEQ CR tablet TAKE 1 TABLET BY MOUTH 2 TIMES DAILY 4/26/2024: Patient thinks he takes this with lunch and dinner but isn't sure. ? 4/26/24    levothyroxine (SYNTHROID/LEVOTHROID) 125 MCG tablet Take 1 tablet (125 mcg) by mouth daily  4/26/2024 at am    MegaRed Omega-3 Krill Oil 500 MG CAPS Take 500 mg by mouth daily  4/26/2024 at am    Melatonin 10 MG TABS tablet Take 10 mg by mouth nightly as needed  prn    metoprolol succinate ER (TOPROL XL) 50 MG 24 hr tablet Take 1 tablet (50 mg) by mouth every evening  4/25/2024 at pm    multivitamin w/minerals (THERA-VIT-M) tablet Take 1 tablet by mouth daily   4/26/2024 at am    nitroGLYcerin (NITROSTAT) 0.4 MG sublingual tablet FOR CHEST PAIN PLACE 1 TAB UNDER TONGUE EVERY 5 MIN FOR 3 DOSES. IF SYMPTOMS PERSIST 5 MIN AFTER 1ST DOSE CALL 911 4/26/2024: No fill history, recommended getting refill as likely out of date. prn    oxyCODONE (ROXICODONE) 5 MG tablet Take 1 tablet (5 mg) by mouth every 8 hours as needed for severe pain  prn at prn    predniSONE (DELTASONE) 1 MG tablet Take 2 tablets (2 mg) by mouth daily For COPD  4/26/2024 at am    rosuvastatin (CRESTOR) 10 MG tablet TAKE 1 TABLET BY MOUTH EVERY DAY (Patient taking differently: Take 10 mg by mouth every evening)  4/25/2024 at pm    senna-docusate (SENOKOT-S/PERICOLACE) 8.6-50 MG tablet Take 1 tablet by mouth 2 times daily as needed for constipation  prn    sulfamethoxazole-trimethoprim (BACTRIM DS) 800-160 MG tablet Take 1 tablet by mouth 2 times daily For 7 days 4/26/2024: Rx filled 4/22/24 for 7 days; patient is unsure when he started and how many tablets he has left. 4/26/2024 at am

## 2024-04-27 NOTE — PROGRESS NOTES
Care Management Follow Up    Length of Stay (days): 0    Expected Discharge Date: 04/28/2024     Concerns to be Addressed: discharge planning     Patient plan of care discussed at interdisciplinary rounds: Yes    Anticipated Discharge Disposition: Transitional Care     Anticipated Discharge Services: None  Anticipated Discharge DME: None    Patient/family educated on Medicare website which has current facility and service quality ratings:    Education Provided on the Discharge Plan: Yes  Patient/Family in Agreement with the Plan: yes    Referrals Placed by CM/SW:    Private pay costs discussed: Not applicable    Additional Information:  Writer spoke with care coordinator who states that she discussed TCU with pt and he is wanting [facility]errals [facility]. Writer sent referrals.     Addendum: Writer spoke to care coordinator who states pt may be ready for discharge tomorrow. Writer sent message to [facility] to see if any ability to accommodate pt tomorrow if medically cleared. Writer placed phone call to [facility]. No answer. Writer left voicemail informing of possible ready for discharge tomorrow and asking if any ability to accommodate. Writer requesting call back.     NIK Varner  Social Work  [facility]

## 2024-04-27 NOTE — PROGRESS NOTES
04/27/24 1042   Appointment Info   Signing Clinician's Name / Credentials (PT) Alexandro Philippe DPT   Rehab Comments (PT) [facility]   Living Environment   People in Home child(yohan), adult   Current Living Arrangements house   Home Accessibility stairs to enter home;stairs within home   Number of Stairs, Main Entrance 4   Stair Railings, Main Entrance railings safe and in good condition   Number of Stairs, Within Home, Primary greater than 10 stairs   Stair Railings, Within Home, Primary railings safe and in good condition   Transportation Anticipated family or friend will provide   Living Environment Comments Reports living in a house w/ his daughter. Reports 4 KRISHNA w/ railings. Another flight of stairs to bedroom.   Self-Care   Usual Activity Tolerance good   Current Activity Tolerance fair   Equipment Currently Used at Home walker, rolling   Fall history within last six months yes   Number of times patient has fallen within last six months 1   Activity/Exercise/Self-Care Comment At baseline does not use an AD for mobility. Has been using a 4WW since his fall a few weeks ago.   General Information   Onset of Illness/Injury or Date of Surgery 04/26/24   Referring Physician Elaina Quintero MD   Patient/Family Therapy Goals Statement (PT) Return to home   Pertinent History of Current Problem (include personal factors and/or comorbidities that impact the POC) Harrison Thomas is a 78 year old male with a history of COPD, CAD, chronic pain syndrome, hypertension and hyperlipidemia who presents with severe hip pain.  Patient had a mechanical fall on 4/16/2024 while he was getting out of a car and fell onto the pavement impacting his left side and hit his head.  He was admitted to an outside facility for evaluation.  At that time x-ray left femur and pelvic x-ray did not show any acute fracture.  He was treated with PT OT and was eventually recommended to discharge home with home health PT/OT/RN and social work all of which  were ordered. Is being admitted under observation for pain control due to his inability to manage at home.   Existing Precautions/Restrictions fall   Weight-Bearing Status - LLE weight-bearing as tolerated   Weight-Bearing Status - RLE weight-bearing as tolerated   Cognition   Affect/Mental Status (Cognition) WFL   Orientation Status (Cognition) oriented x 4   Follows Commands (Cognition) WFL   Pain Assessment   Patient Currently in Pain Yes, see Vital Sign flowsheet  (Pain to back/L hip)   Range of Motion (ROM)   ROM Comment Limited L hip ROM d/t pain   Strength (Manual Muscle Testing)   Strength Comments Grossly 4+/5 to RLE and 3/5 to LLE   Bed Mobility   Comment, (Bed Mobility) Supine to sitting EOB w/ SBA   Transfers   Comment, (Transfers) Sit to stand w/ 4WW and Min A x1   Gait/Stairs (Locomotion)   Comment, (Gait/Stairs) 10 ft w/ 4WW and Min A x1   Balance   Balance Comments No overt LOB noted   Clinical Impression   Criteria for Skilled Therapeutic Intervention Yes, treatment indicated   PT Diagnosis (PT) Impaired ambulation   Influenced by the following impairments Impaired strength, balance and activity tolerance   Functional limitations due to impairments Impaired ADLs, IADLs and functional mobility   Clinical Presentation (PT Evaluation Complexity) stable   Clinical Presentation Rationale Clinical judgment   Clinical Decision Making (Complexity) low complexity   Planned Therapy Interventions (PT) balance training;bed mobility training;gait training;home exercise program;patient/family education;stair training;strengthening;transfer training;progressive activity/exercise   Risk & Benefits of therapy have been explained evaluation/treatment results reviewed;care plan/treatment goals reviewed;risks/benefits reviewed;current/potential barriers reviewed;participants voiced agreement with care plan;participants included;patient   PT Total Evaluation Time   PT Andria, Low Complexity Minutes (80607) 10   Physical  Therapy Goals   PT Frequency 5x/week   PT Predicted Duration/Target Date for Goal Attainment 05/07/24   PT Goals Bed Mobility;Transfers;Gait;Stairs   PT: Bed Mobility Independent;Supine to/from sit   PT: Transfers Modified independent;Sit to/from stand;Assistive device   PT: Gait Modified independent;150 feet;Rolling walker   PT: Stairs Supervision/stand-by assist;Greater than 10 stairs;Rail on right   Interventions   Interventions Quick Adds Gait Training;Therapeutic Activity   Therapeutic Activity   Therapeutic Activities: dynamic activities to improve functional performance Minutes (30713) 8   Symptoms Noted During/After Treatment Increased pain   Treatment Detail/Skilled Intervention Pt supine in bed at start of session. Agreeable to PT. Once EOB good sitting balance. Completing sit to stand w/ 4WW x5 in a row x2 sets w/ 4WW and Min A x1. Frequently yelling out in pain in act of standing. Cues needed for tall posture. In standing able to march in palce and complete hamstring curls w/ difficulty. Difficult time lifting LLE or WBing soley on LLE. Completing an additional x2 sit to stands w/ 4WW and CGA to Min A x1. Transfering to bedside chair w/ 4WW and CGA. Frequently not locking breaks for stand to sit. All needs met w/ call light in place and chair alarm on.   Gait Training   Gait Training Minutes (83898) 15   Symptoms Noted During/After Treatment (Gait Training) increased pain   Treatment Detail/Skilled Intervention Pt ambulating ~150 ft x2 w/ 4WW and Min A x1 to CGA. Notes increased pain w/ mobility. Quick rafael noted at times. No overt LOB. Short step length.   PT Discharge Planning   PT Plan Activity tolerance, amb distance, strengthening, balance, stairs   PT Discharge Recommendation (DC Rec) Transitional Care Facility   PT Rationale for DC Rec Pt below baseline mobility. Currently needing A x1 to safely ambulate w/ 4WW. Has been having difficulty managing at home. Anticipate when medically ready Pt  will need TCU to improve upon functional mobility and strengthening.   PT Brief overview of current status CGA to Min A x1 w/ 4WW   Total Session Time   Timed Code Treatment Minutes 23   Total Session Time (sum of timed and untimed services) 33

## 2024-04-27 NOTE — PLAN OF CARE
PRIMARY Concern: Hip pain  SAFETY RISK Concerns (fall risk, behaviors, etc.): Fall       Isolation/Type: None  Tests/Procedures for NEXT shift:   Consults? (Pending/following, signed-off?) ortho consult, Ot, PT, SW  Where is patient from? (Home, TCU, etc.): Home  Other Important info for NEXT shift:   Anticipated DC date & active delays: TBD  _____________________________________________________________________________  SUMMARY NOTE:             Orientation/Cognitive: A&OX4   Observation Goals (Met/ Not Met): Not Met  Mobility Level/Assist Equipment: A1/GB/Walker   Antibiotics & Plan (IV/po, length of tx left): BACTRIM DS  Pain Management: PRN DILAUDID X2,   Tele/VS/O2: VSS/1L O2  ABNL Lab/BG: See Chart  Diet: Regular Diet  Bowel/Bladder: Continent  Skin Concerns: Scattered Bruises And Scabs   Drains/Devices: PIV/ NS 100Ml/Hr   Patient Stated Goal for Today:          Observation goals  PRIOR TO DISCHARGE       Comments: -diagnostic tests and consults completed and resulted -Not Met  -vital signs normal or at patient baseline -Not Met  Nurse to notify provider when observation goals have been met and patient is ready for discharge.

## 2024-04-27 NOTE — PROGRESS NOTES
RECEIVING UNIT ED HANDOFF REVIEW    ED Nurse Handoff Report was reviewed by: Ishan Godwin RN on April 26, 2024 at 9:24 PM

## 2024-04-27 NOTE — PROGRESS NOTES
[facility]    Hospitalist Progress Note    Date of Admission:  4/26/2024    Assessment & Plan     Harrison Thomas is a 78 year old male with a history of COPD, CAD, chronic pain syndrome, hypertension and hyperlipidemia who presents with severe hip pain.  Patient had a mechanical fall on 4/16/2024 while he was getting out of a car and fell onto the pavement impacting his left side and hit his head.  He was admitted to an outside facility for evaluation.  At that time x-ray left femur and pelvic x-ray did not show any acute fracture.  He was treated with PT OT and was eventually recommended to discharge home with home health PT/OT/RN and social work all of which were ordered.  He was discharged on Flexeril as needed and oxycodone as needed 10 tablets.     Patient continued to have severe pain even after discharge and was seen at office visit at which time CT of the left femur without contrast was ordered and orthopedic referral was done.  He was seen in sports medicine clinic on 4/25/2024 and was found to have significant difficulty with knee extension and ambulation and lifting his hip was very painful.  Most of his pain was in the anterior thigh region with palpation.  Internal rotation was very painful.       CT femur done yesterday showed mildly displaced inferior pubic rami comminuted fracture, suspect nondisplaced pubic root fracture though beam streak artifact limits evaluation.  Patient presented to the ED  with progressively worsening pain and inability to manage at home.  In the ER, patient had underwent CT lumbar spine that showed no acute fracture.  Chronic moderate compression deformity of L2.    CT pelvic bone showed nondisplaced fracture of the left pubic rami.     Is being admitted under observation for pain control due to his inability to manage at home.     #nondisplaced/mildly displaced fracture of the left pubic rami  Nondisplaced pubic root fracture     -Patient being  admitted under observation status  -As needed , Robaxin, Tylenol ordered.  Pt  reports that oxycodone has not been very helpful with pain control.  Changed to as needed oral and IV Dilaudid.  -Ortho consulted-recommended WBAT, outpatient follow-up in 6 weeks with Dr. Alicia MENDEZ.  -PT OT consulted.  Social work consulted.  Currently using walker post fall.  -Patient will likely need placement  -Will add trazodone to help with sleep     #Acute kidney injury, resolved  -Baseline creatinine 0.9.  Creatinine on 4/26/2024 at 1.31.  Likely prerenal in etiology.  -Received 1 L normal saline bolus in the ER.  Repeat creatinine on 4/27 normalized  -Avoid nephrotoxic medications.  Strict I's and O's.  Monitor urine output.     #2 history of COPD  -On low-dose prednisone 2 mg daily  -On DuoNebs 4 times daily  -On trilegy at home.  Being replaced by Incruse with Breo inhaler here.     #History of right elbow olecranon bursitis/effusion  -Right elbow pain and swelling after falling on right elbow s/p aspiration with removal of 24 mL of cloudy pink/yellow fluid.  On 4/7/2024  -Completed p.o. antibiotics  doxycycline and then Keflex x 10 days  -4/7 fluid culture grew 1+ staph lugdunensis  -Right elbow x-ray on 4/18/2024 showed soft tissue swelling over the olecranon with no evidence of fracture or dislocation.  No effusion.     #Coronary artery disease without active chest pain  On aspirin 81 daily.  Holding metoprolol XL in the setting of borderline low pressures and CALLUM  History of remote intervention in '97 with stent to the circumflex  Last echocardiogram in 2023 showed EF 60 to 65% with no regional wall motion abnormalities and normal left and right ventricle     #Hypertension -on amlodipine 5 mg daily, Toprol-XL.  Resumed on 4/27     #Hyperlipidemia-on rosuvastatin 10 mg daily.      #Hypothyroidism-on levothyroxine 125 mcg daily.  TSH normal       #Obstructive sleep apnea-uses nocturnal oxygen.  Does not tolerate CPAP.    "  #History of sciatica with chronic back pain-on gabapentin 400 3 times daily,      #History rheumatoid arthritis involving multiple sites with positive rheumatoid factor-no symptoms at present.  Not on active treatment.     #History of lung mass s/p VATS biopsy with no specific diagnosis.  High suspicion for cancer.  He underwent radiation therapy with probable radiation pneumonitis in 2022.  Latest PFT showed severe obstruction with reduced TLC at 74% with markedly reduced DLCO at 41%.     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     #? left olecranon bursitis-patient is currently on Bactrim which was apparently started on 4/22/2024.  I could not find any documentation for this but there was brief mention of possible left olecranon bursitis.  Will continue this for now and complete a 7-day course.              Clinically Significant Risk Factors Present on Admission                # Drug Induced Platelet Defect: home medication list includes an antiplatelet medication   # Hypertension: Noted on problem list      # Overweight: Estimated body mass index is 25.11 kg/m  as calculated from the following:    Height as of this encounter: 1.778 m (5' 10\").    Weight as of this encounter: 79.4 kg (175 lb).                  DVT Prophylaxis: Aspirin 81 twice daily  Code Status: Full Code  Medically Ready for Discharge: Anticipated Tomorrow     Greater than 35 minutes spent on documentation review, reviewing the images and prior notes including external admission notes and discussing care with patient.         Medical Decision Making       Please see A&P for additional details of medical decision making.        Clinically Significant Risk Factors Present on Admission                # Drug Induced Platelet Defect: home medication list includes an antiplatelet medication   # Hypertension: Noted on problem list      # " "Obesity: Estimated body mass index is 31.42 kg/m  as calculated from the following:    Height as of this encounter: 1.554 m (5' 1.2\").    Weight as of this encounter: 75.9 kg (167 lb 6.4 oz).       # Financial/Environmental Concerns: none           Ashley Langley MD  Text Page (7am - 6pm, M-F)  [facility]  Securely message with the Vocera Web Console (learn more here)  Text page via [employer] Paging/Directory      Interval History   Patient has been stable since admission however continues to complain of pain in the left groin with any movement of the left leg.  Able to lift the right leg off the bed.  No significant pain anywhere else.    -Data reviewed today: I reviewed all new labs and imaging results over the last 24 hours. I personally reviewed CT scan with result as noted above    Physical Exam   Temp: 97.8  F (36.6  C) Temp src: Oral BP: (!) 176/33 Pulse: 65   Resp: 20 SpO2: 96 % O2 Device: Nasal cannula Oxygen Delivery: 1 LPM  Vitals:    04/26/24 1410 04/26/24 2144   Weight: 79.4 kg (175 lb) 75.9 kg (167 lb 6.4 oz)     Vital Signs with Ranges  Temp:  [97.8  F (36.6  C)-98.4  F (36.9  C)] 97.8  F (36.6  C)  Pulse:  [60-88] 65  Resp:  [20-22] 20  BP: (112-176)/(33-67) 176/33  SpO2:  [90 %-96 %] 96 %  I/O last 3 completed shifts:  In: -   Out: 400 [Urine:400]    Constitutional: Alert, appears comfortable, in no acute distress  Respiratory: Non labored breathing, wearing nasal cannula oxygen, has bilateral expiratory wheezes noted  Cardiovascular: Heart sounds regular rate and rhythm, no murmurs, no leg edema  GI: Abdomen is soft, non distended, non tender. Normal BS  Skin/Integumen: no rashes, no pressure sores  Neuro: alert, converses appropriately, significant pain in left groin while moving his left lower extremity, able to lift RLE off the bed  Psych: mood and affect appropriate      Medications   Current Facility-Administered Medications   Medication Dose Route Frequency Provider " Last Rate Last Admin    sodium chloride 0.9 % infusion   Intravenous Continuous Elaina Quintero  mL/hr at 04/27/24 0558 New Bag at 04/27/24 0558     Current Facility-Administered Medications   Medication Dose Route Frequency Provider Last Rate Last Admin    amLODIPine (NORVASC) tablet 5 mg  5 mg Oral At Bedtime Ashley Langley MD        aspirin EC tablet 81 mg  81 mg Oral BID Elaina Quintero MD   81 mg at 04/27/24 0808    Fluticasone-Umeclidin-Vilant (TRELEGY ELLIPTA) 100-62.5-25 MCG/ACT oral inhaler 1 puff  1 puff Inhalation Daily Alexandro Andrew PA-C        gabapentin (NEURONTIN) capsule 400 mg  400 mg Oral TID Elaina Quintero MD   400 mg at 04/27/24 0807    ipratropium - albuterol 0.5 mg/2.5 mg/3 mL (DUONEB) neb solution 3 mL  3 mL Nebulization 4x Daily Elaina Quintero MD   3 mL at 04/27/24 1235    levothyroxine (SYNTHROID/LEVOTHROID) tablet 125 mcg  125 mcg Oral Daily Elaina Quintero MD   125 mcg at 04/27/24 0808    methocarbamol (ROBAXIN) tablet 500 mg  500 mg Oral 4x Daily Bernie Jenkins PA-C   500 mg at 04/27/24 1249    metoprolol succinate ER (TOPROL XL) 24 hr tablet 50 mg  50 mg Oral QPM Ashley Langley MD        polyethylene glycol (MIRALAX) Packet 17 g  17 g Oral Daily Elaina Quintero MD        potassium chloride santo ER (KLOR-CON M20) CR tablet 20 mEq  20 mEq Oral BID Elaina Quintero MD   20 mEq at 04/27/24 0808    predniSONE (DELTASONE) tablet 2 mg  2 mg Oral Daily Elaina Quintreo MD   2 mg at 04/27/24 0807    rosuvastatin (CRESTOR) tablet 10 mg  10 mg Oral Daily Elaina Quintero MD   10 mg at 04/26/24 2231    senna-docusate (SENOKOT-S/PERICOLACE) 8.6-50 MG per tablet 1 tablet  1 tablet Oral BID Elaina Quintero MD   1 tablet at 04/27/24 0807    Or    senna-docusate (SENOKOT-S/PERICOLACE) 8.6-50 MG per tablet 2 tablet  2 tablet Oral BID Elaina Quintero MD        sulfamethoxazole-trimethoprim (BACTRIM DS) 800-160 MG per tablet 1 tablet  1 tablet Oral  BID Elaina Quintero MD   1 tablet at 04/27/24 0808    traZODone (DESYREL) half-tab 25 mg  25 mg Oral At Bedtime Elaina Quintero MD   25 mg at 04/26/24 2230    Or    traZODone (DESYREL) tablet 50 mg  50 mg Oral At Bedtime Elaina Quintero MD           Data   Recent Labs   Lab 04/27/24  0804 04/26/24  1803 04/22/24  1151   WBC 6.7 7.9 7.1   HGB 13.8 13.7 14.7   MCV 90 89 91    199 179    136 141   POTASSIUM 4.6 4.3 4.7   CHLORIDE 105 101 104   CO2 23 26 27   BUN 14.1 18.7 12.1   CR 1.06 1.31* 0.98   ANIONGAP 12 9 10   KARLIE 8.6* 9.1 9.5   GLC 85 84 105*   ALBUMIN  --   --  4.2   PROTTOTAL  --   --  7.0   BILITOTAL  --   --  0.6   ALKPHOS  --   --  83   ALT  --   --  18   AST  --   --  19       Imaging  Recent Results (from the past 24 hour(s))   CT Lumbar Spine w/o Contrast    Narrative    EXAM: CT LUMBAR SPINE W/O CONTRAST  LOCATION: [facility]  DATE: 4/26/2024    INDICATION: Low back pain in midline, recent fall.  COMPARISON: X-ray 03/19/2024.  TECHNIQUE: Routine CT Lumbar Spine without IV contrast. Multiplanar reformats. Dose reduction techniques were used.     FINDINGS:  VERTEBRA: No acute fracture or posttraumatic subluxation. Moderate chronic loss of height at L2, unchanged from x-ray of 03/19/2024. Grade 1 anterolisthesis L4-L5. Minor dextrocurvature. Osteopenic bones.    CANAL/FORAMINA: At L2-L3, moderate central, high-grade bilateral foraminal stenosis. At L3-L4, severe central and moderate bilateral foraminal stenosis. At L4-L5, severe central and moderate bilateral foraminal stenosis.    PARASPINAL: No extraspinal abnormality.      Impression    IMPRESSION:  1.  No acute fracture or posttraumatic subluxation.  2.  Chronic moderate compression deformity of L2.   CT Pelvis Bone wo Contrast    Narrative    EXAM: CT PELVIS BONE WO CONTRAST  LOCATION: [facility]  DATE: 4/26/2024    INDICATION: Fall, left hip pain.  COMPARISON:  None.  TECHNIQUE: CT scan of the pelvis was performed without IV contrast. Multiplanar reformats were obtained. Dose reduction techniques were used.  CONTRAST: None.    FINDINGS: Postoperative changes of bilateral total hip arthroplasty. The components appear well seated bilaterally. No evidence for fracture. No evidence for reactive osteolysis. Chronic irregularity of the anterior aspect of the intertrochanteric region   of the left hip. This is not an acute finding.    There is a fracture of the left inferior pubic ramus. Even in retrospect, this is not identified on recent x-ray evaluation 04/17/2024. Additional nondisplaced fracture of the junction of the anterior column of the left acetabulum and left superior pubic   ramus. No additional fractures are identified.    The intrapelvic contents are negative for abnormal mass, free fluid, or lymphadenopathy. There is a benign intramuscular lipoma within the right lateral oblique musculature incompletely visualized on the field-of-view within the abdomen.      Impression    IMPRESSION:  1.  Nondisplaced fractures of the left pubic rami.  2.  Postoperative changes bilateral total hip arthroplasty. Components appear well seated bilaterally.  3.  Chronic irregularity along the anterior aspect of the left intertrochanteric region. This could be related to old injury, but this is not an acute finding.  4.  Incompletely visualized benign-appearing intramuscular lipoma within the right lateral oblique musculature of the abdomen.  5.  Intrapelvic contents negative.

## 2024-04-27 NOTE — PROGRESS NOTES
Formal consult note to follow however plan is to have patient be WBAT, mobilize with PT/OT, pain control, added robaxin 500mg, follow up as outpatient in 6 weeks with Dr. Vargas at [facility].

## 2024-04-27 NOTE — PROGRESS NOTES
Observation goals  PRIOR TO DISCHARGE        Comments: -diagnostic tests and consults completed and resulted  Not Met  -vital signs normal or at patient baseline  Not Met  Nurse to notify provider when observation goals have been met and patient is ready for discharge.

## 2024-04-28 ENCOUNTER — LAB REQUISITION (OUTPATIENT)
Dept: LAB | Facility: CLINIC | Age: 79
End: 2024-04-28

## 2024-04-28 VITALS
OXYGEN SATURATION: 92 % | HEART RATE: 80 BPM | TEMPERATURE: 99.4 F | SYSTOLIC BLOOD PRESSURE: 112 MMHG | BODY MASS INDEX: 31.6 KG/M2 | RESPIRATION RATE: 18 BRPM | HEIGHT: 61 IN | DIASTOLIC BLOOD PRESSURE: 65 MMHG | WEIGHT: 167.4 LBS

## 2024-04-28 DIAGNOSIS — Z11.1 ENCOUNTER FOR SCREENING FOR RESPIRATORY TUBERCULOSIS: ICD-10-CM

## 2024-04-28 PROCEDURE — G0378 HOSPITAL OBSERVATION PER HR: HCPCS

## 2024-04-28 PROCEDURE — 250N000013 HC RX MED GY IP 250 OP 250 PS 637: Performed by: HOSPITALIST

## 2024-04-28 PROCEDURE — 250N000013 HC RX MED GY IP 250 OP 250 PS 637: Performed by: PHYSICIAN ASSISTANT

## 2024-04-28 PROCEDURE — 999N000111 HC STATISTIC OT IP EVAL DEFER

## 2024-04-28 PROCEDURE — 999N000157 HC STATISTIC RCP TIME EA 10 MIN

## 2024-04-28 PROCEDURE — 99239 HOSP IP/OBS DSCHRG MGMT >30: CPT | Performed by: HOSPITALIST

## 2024-04-28 PROCEDURE — 94640 AIRWAY INHALATION TREATMENT: CPT

## 2024-04-28 PROCEDURE — 250N000012 HC RX MED GY IP 250 OP 636 PS 637: Performed by: HOSPITALIST

## 2024-04-28 PROCEDURE — 250N000009 HC RX 250: Performed by: HOSPITALIST

## 2024-04-28 RX ORDER — ACETAMINOPHEN 500 MG
1000 TABLET ORAL 3 TIMES DAILY
Status: DISCONTINUED | OUTPATIENT
Start: 2024-04-28 | End: 2024-04-28 | Stop reason: HOSPADM

## 2024-04-28 RX ORDER — METHOCARBAMOL 500 MG/1
500 TABLET, FILM COATED ORAL 4 TIMES DAILY
DISCHARGE
Start: 2024-04-28 | End: 2024-05-14

## 2024-04-28 RX ORDER — HYDROMORPHONE HYDROCHLORIDE 2 MG/1
2 TABLET ORAL EVERY 6 HOURS PRN
Qty: 20 TABLET | Refills: 0 | Status: SHIPPED | OUTPATIENT
Start: 2024-04-28 | End: 2024-05-01

## 2024-04-28 RX ORDER — SULFAMETHOXAZOLE/TRIMETHOPRIM 800-160 MG
1 TABLET ORAL 2 TIMES DAILY
DISCHARGE
Start: 2024-04-28 | End: 2024-04-30

## 2024-04-28 RX ORDER — AMOXICILLIN 250 MG
1 CAPSULE ORAL 2 TIMES DAILY
DISCHARGE
Start: 2024-04-28

## 2024-04-28 RX ADMIN — PREDNISONE 2 MG: 1 TABLET ORAL at 08:00

## 2024-04-28 RX ADMIN — ACETAMINOPHEN 1000 MG: 500 TABLET, FILM COATED ORAL at 11:35

## 2024-04-28 RX ADMIN — METHOCARBAMOL 500 MG: 500 TABLET ORAL at 11:35

## 2024-04-28 RX ADMIN — METHOCARBAMOL 500 MG: 500 TABLET ORAL at 08:00

## 2024-04-28 RX ADMIN — SULFAMETHOXAZOLE AND TRIMETHOPRIM 1 TABLET: 800; 160 TABLET ORAL at 07:59

## 2024-04-28 RX ADMIN — ASPIRIN 81 MG: 81 TABLET, COATED ORAL at 07:59

## 2024-04-28 RX ADMIN — GABAPENTIN 400 MG: 300 CAPSULE ORAL at 14:31

## 2024-04-28 RX ADMIN — IPRATROPIUM BROMIDE AND ALBUTEROL SULFATE 3 ML: .5; 3 SOLUTION RESPIRATORY (INHALATION) at 15:12

## 2024-04-28 RX ADMIN — METHOCARBAMOL 500 MG: 500 TABLET ORAL at 15:41

## 2024-04-28 RX ADMIN — LEVOTHYROXINE SODIUM 125 MCG: 125 TABLET ORAL at 08:00

## 2024-04-28 RX ADMIN — POTASSIUM CHLORIDE 20 MEQ: 1500 TABLET, EXTENDED RELEASE ORAL at 07:59

## 2024-04-28 RX ADMIN — ACETAMINOPHEN 1000 MG: 500 TABLET, FILM COATED ORAL at 14:31

## 2024-04-28 RX ADMIN — HYDROMORPHONE HYDROCHLORIDE 2 MG: 2 TABLET ORAL at 14:35

## 2024-04-28 RX ADMIN — GABAPENTIN 400 MG: 300 CAPSULE ORAL at 08:00

## 2024-04-28 RX ADMIN — IPRATROPIUM BROMIDE AND ALBUTEROL SULFATE 3 ML: .5; 3 SOLUTION RESPIRATORY (INHALATION) at 10:53

## 2024-04-28 RX ADMIN — CYCLOBENZAPRINE 10 MG: 10 TABLET, FILM COATED ORAL at 14:35

## 2024-04-28 RX ADMIN — DOCUSATE SODIUM 50 MG AND SENNOSIDES 8.6 MG 2 TABLET: 8.6; 5 TABLET, FILM COATED ORAL at 08:00

## 2024-04-28 RX ADMIN — HYDROMORPHONE HYDROCHLORIDE 2 MG: 2 TABLET ORAL at 04:34

## 2024-04-28 RX ADMIN — IPRATROPIUM BROMIDE AND ALBUTEROL SULFATE 3 ML: .5; 3 SOLUTION RESPIRATORY (INHALATION) at 06:45

## 2024-04-28 RX ADMIN — CYCLOBENZAPRINE 10 MG: 10 TABLET, FILM COATED ORAL at 04:34

## 2024-04-28 ASSESSMENT — ACTIVITIES OF DAILY LIVING (ADL)
ADLS_ACUITY_SCORE: 40
ADLS_ACUITY_SCORE: 41
ADLS_ACUITY_SCORE: 40

## 2024-04-28 NOTE — PROGRESS NOTES
Care Management Discharge Note    Discharge Date: 04/28/2024       Discharge Disposition: Transitional Care    Discharge Services: None    Discharge DME: None    Discharge Transportation: family or friend will provide    Private pay costs discussed: Not applicable    Does the patient's insurance plan have a 3 day qualifying hospital stay waiver?  Yes     Which insurance plan 3 day waiver is available? ACO REACH    Will the waiver be used for post-acute placement? Yes    PAS Confirmation Code: [id number]  Patient/family educated on Medicare website which has current facility and service quality ratings: yes    Education Provided on the Discharge Plan: Yes  Persons Notified of Discharge Plans: Pt, daughter, RN, facility  Patient/Family in Agreement with the Plan: yes    Handoff Referral Completed: No    Additional Information:  Pt will discharge today to [facility] via [facility] at 1600. Orders faxed and facility updated. Ann at [facility] confirmed pt can use the Medicare ACO reach program for TCU coverage. Pt updated and in agreement with discharge plan. Daughter Janey also updated and in agreement. Explained no available at [facility] on the weekends to determine bed availability. Both understood. Nursing aware.     KINA Chinchilla, [facility]  [phone] Desk phone  [phone] Cell/text (Preferred)  [facility]    PAS-RR    D: Per DHS regulation, SW completed and submitted PAS-RR to [place] [employer] on [employer]ging Direct Connect via the Senior LinkAge Line.  PAS-RR confirmation # is : [id number]      P: Further questions may be directed to [facility] LinkAge Line at #[phone], option #4 for PAS-RR staff.

## 2024-04-28 NOTE — PLAN OF CARE
PRIMARY Concern: L pubic rami fx  SAFETY RISK Concerns (fall risk, behaviors, etc.): Fall Risk      Isolation/Type: n/a  Tests/Procedures for NEXT shift: n/a  Consults? (Pending/following, signed-off?) Orthopedic sign off, PT/OT  Where is patient from? (Home, TCU, etc.): Home  Other Important info for NEXT shift: n/a  Anticipated DC date & active delays: 4/28 [facility]  _____________________________________________________________________________  SUMMARY NOTE:  Orientation/Cognitive: A&Ox4  Observation Goals (Met/ Not Met): Partially Met  Mobility Level/Assist Equipment: A-1 GB/walker  Antibiotics & Plan (IV/po, length of tx left): Bactrim   Pain Management: Scheduled robaxin, prn flexeril  Tele/VS/O2: VSS on 1L NC baseline for pt  ABNL Lab/BG: n/a  Diet: Reg  Bowel/Bladder: Cont. B&B  Skin Concerns: scattered bruising and scraps  Drains/Devices: IV SL  Patient Stated Goal for Today:              Observation goals  PRIOR TO DISCHARGE       Comments: -diagnostic tests and consults completed and resulted- Met  -vital signs normal or at patient baseline- Met  Nurse to notify provider when observation goals have been met and patient is ready for discharge.

## 2024-04-28 NOTE — PROGRESS NOTES
Observation goals  PRIOR TO DISCHARGE       Comments: -diagnostic tests and consults completed and resulted- Met  -vital signs normal or at patient baseline- Met  Nurse to notify provider when observation goals have been met and patient is ready for discharge.

## 2024-04-28 NOTE — PROVIDER NOTIFICATION
MD Notification    Notified Person: MD    Notified Person Name: Danny Brennan    Notification Date/Time: 4/27/682990     Notification Interaction: [email or web address] messaging    Purpose of Notification: Pt requesting to have fluids stopped. Cr 1.06 today. Down from 1.36 yesterday.     Orders Received: MD notified. Okay to stop fluids.    Comments:

## 2024-04-28 NOTE — DISCHARGE SUMMARY
Discharge Summary  Hospitalist    Date of Admission:  4/26/2024  Date of Discharge:  4/28/2024  Discharging Provider: Ashley Langley MD, MD  Date of Service (when I saw the patient): 04/28/24    Discharge Diagnoses     #nondisplaced/mildly displaced fracture of the left pubic rami  Nondisplaced pubic root fracture    History of Present Illness   Please refer H & P for details.      Hospital Course   Harrison Thomas is a 78 year old male with a history of COPD, CAD, chronic pain syndrome, hypertension and hyperlipidemia who presents with severe hip pain.  Patient had a mechanical fall on 4/16/2024 while he was getting out of a car and fell onto the pavement impacting his left side and hit his head.  He was admitted to an outside facility for evaluation.  At that time x-ray left femur and pelvic x-ray did not show any acute fracture.  He was treated with PT OT and was eventually recommended to discharge home with home health PT/OT/RN and social work all of which were ordered.  He was discharged on Flexeril as needed and oxycodone as needed 10 tablets.     Patient continued to have severe pain even after discharge and was seen at office visit at which time CT of the left femur without contrast was ordered and orthopedic referral was done.  He was seen in sports medicine clinic on 4/25/2024 and was found to have significant difficulty with knee extension and ambulation and lifting his hip was very painful.  Most of his pain was in the anterior thigh region with palpation.  Internal rotation was very painful.       CT femur done yesterday showed mildly displaced inferior pubic rami comminuted fracture, suspect nondisplaced pubic root fracture though beam streak artifact limits evaluation.  Patient presented to the ED  with progressively worsening pain and inability to manage at home.  In the ER, patient had underwent CT lumbar spine that showed no acute fracture.  Chronic moderate compression deformity of L2.    CT  pelvic bone showed nondisplaced fracture of the left pubic rami.     Is being admitted under observation for pain control due to his inability to manage at home.     #nondisplaced/mildly displaced fracture of the left pubic rami  Nondisplaced pubic root fracture     -Patient being admitted under observation status  -As needed , Robaxin, Tylenol ordered.  Pt  reports that oxycodone has not been very helpful with pain control.  Changed to as needed oral and IV Dilaudid.  Prescribed short course of oral Dilaudid as needed at discharge.  -Ortho consulted-recommended WBAT, outpatient follow-up in 6 weeks with [facility], Dr. Vargas.  -PT OT consulted.  Social work consulted.  Currently using walker post fall.  -Patient will likely need placement-discharged to TCU in stable condition on 4/28.  -Will add trazodone to help with sleep     #Acute kidney injury, resolved  -Baseline creatinine 0.9.  Creatinine on 4/26/2024 at 1.31.  Likely prerenal in etiology.  -Received 1 L normal saline bolus in the ER.  Repeat creatinine on 4/27 normalized  -Avoid nephrotoxic medications.  Strict I's and O's.  Monitor urine output.     #2 history of COPD  -On low-dose prednisone 2 mg daily  -On DuoNebs 4 times daily  -On trilegy at home.  Being replaced by Incruse with Breo inhaler here.     #History of right elbow olecranon bursitis/effusion  -Right elbow pain and swelling after falling on right elbow s/p aspiration with removal of 24 mL of cloudy pink/yellow fluid.  On 4/7/2024  -Completed p.o. antibiotics  doxycycline and then Keflex x 10 days  -4/7 fluid culture grew 1+ staph lugdunensis  -Right elbow x-ray on 4/18/2024 showed soft tissue swelling over the olecranon with no evidence of fracture or dislocation.  No effusion.     #Coronary artery disease without active chest pain  On aspirin 81 daily.  Holding metoprolol XL in the setting of borderline low pressures and CALLUM  History of remote intervention in '97 with stent to the circumflex  Last  echocardiogram in 2023 showed EF 60 to 65% with no regional wall motion abnormalities and normal left and right ventricle     #Hypertension -on amlodipine 5 mg daily, Toprol-XL.  Resumed on 4/27     #Hyperlipidemia-on rosuvastatin 10 mg daily.      #Hypothyroidism-on levothyroxine 125 mcg daily.  TSH normal        #Obstructive sleep apnea-uses nocturnal oxygen.  Does not tolerate CPAP.     #History of sciatica with chronic back pain-on gabapentin 400 3 times daily,      #History rheumatoid arthritis involving multiple sites with positive rheumatoid factor-no symptoms at present.  Not on active treatment.     #History of lung mass s/p VATS biopsy with no specific diagnosis.  High suspicion for cancer.  He underwent radiation therapy with probable radiation pneumonitis in 2022.  Latest PFT showed severe obstruction with reduced TLC at 74% with markedly reduced DLCO at 41%.     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     #? left olecranon bursitis-patient is currently on Bactrim which was apparently started on 4/22/2024.  I could not find any documentation for this but there was brief mention of possible left olecranon bursitis.  Will continue this for now and complete a 7-day course.         Ashley Langley MD, MD      Pending Results   These results will be followed up by Hospitalist team.  Unresulted Labs Ordered in the Past 30 Days of this Admission       No orders found from 3/27/2024 to 4/27/2024.            Code Status   Full Code       Primary Care Physician   Yaneli Dozier    Follow-ups Needed After Discharge   Follow-up Appointments     Follow Up and recommended labs and tests      Follow up with Nursing home physician.  No follow up labs or test are   needed.  Follow-up with [facility],  in 6 weeks.            Physical Exam   Temp: 99.4  F (37.4  C) Temp src: Oral BP: 112/65  Pulse: 80   Resp: 18 SpO2: 92 % O2 Device: Nasal cannula Oxygen Delivery: 3 LPM  Vitals:    04/26/24 1410 04/26/24 2144   Weight: 79.4 kg (175 lb) 75.9 kg (167 lb 6.4 oz)     Vital Signs with Ranges  Temp:  [97.5  F (36.4  C)-99.4  F (37.4  C)] 99.4  F (37.4  C)  Pulse:  [68-87] 80  Resp:  [18-22] 18  BP: (112-139)/(61-75) 112/65  SpO2:  [90 %-97 %] 92 %  I/O last 3 completed shifts:  In: 400 [P.O.:400]  Out: 500 [Urine:500]    Constitutional: Alert, appears comfortable, in no acute distress  Respiratory: Non labored breathing, wearing nasal cannula oxygen, has bilateral expiratory wheezes noted  Cardiovascular: Heart sounds regular rate and rhythm, no murmurs, no leg edema  GI: Abdomen is soft, non distended, non tender. Normal BS  Skin/Integumen: no rashes, no pressure sores  Neuro: alert, converses appropriately, significant pain in left groin while moving his left lower extremity, able to lift RLE off the bed  Psych: mood and affect appropriate               Discharge Disposition   Discharged to short-term care facility  Condition at discharge: Stable    Consultations This Hospital Stay   OCCUPATIONAL THERAPY ADULT IP CONSULT  ORTHOPEDIC SURGERY IP CONSULT  PHYSICAL THERAPY ADULT IP CONSULT  CARE MANAGEMENT / SOCIAL WORK IP CONSULT  PHYSICAL THERAPY ADULT IP CONSULT  OCCUPATIONAL THERAPY ADULT IP CONSULT    Time Spent on this Encounter   IAshley MD, personally saw the patient today and spent greater than 30 minutes discharging this patient.    Discharge Orders      Medication Therapy Management Referral      General info for SNF    Length of Stay Estimate: Short Term Care: Estimated # of Days <30  Condition at Discharge: Improving  Level of care:skilled   Rehabilitation Potential: Good  Admission H&P remains valid and up-to-date: Yes  Recent Chemotherapy: N/A  Use Nursing Home Standing Orders: Yes     Mantoux instructions    Give two-step Mantoux (PPD) Per Facility Policy Yes     Follow Up and  recommended labs and tests    Follow up with Nursing home physician.  No follow up labs or test are needed.  Follow-up with [facility],  in 6 weeks.     Reason for your hospital stay    Fall with pelvic fracture     Activity - Up with nursing assistance     Weight bearing status    Weightbearing as tolerated     Physical Therapy Adult Consult    Evaluate and treat as clinically indicated.    Reason: Weakness     Occupational Therapy Adult Consult    Evaluate and treat as clinically indicated.    Reason: Weakness     Oxygen (SNF/TCU) Discharge     Fall precautions     Diet    Follow this diet upon discharge: Orders Placed This Encounter      Regular Diet Adult     Discharge Medications   Current Discharge Medication List        START taking these medications    Details   HYDROmorphone (DILAUDID) 2 MG tablet Take 1 tablet (2 mg) by mouth every 6 hours as needed for severe pain  Qty: 20 tablet, Refills: 0    Associated Diagnoses: Closed nondisplaced fracture of pelvis, unspecified part of pelvis, initial encounter (H)      methocarbamol (ROBAXIN) 500 MG tablet Take 1 tablet (500 mg) by mouth 4 times daily    Associated Diagnoses: Closed nondisplaced fracture of pelvis, unspecified part of pelvis, initial encounter (H)           CONTINUE these medications which have CHANGED    Details   senna-docusate (SENOKOT-S/PERICOLACE) 8.6-50 MG tablet Take 1 tablet by mouth 2 times daily Hold for loose stools    Associated Diagnoses: Closed nondisplaced fracture of pelvis, unspecified part of pelvis, initial encounter (H)      sulfamethoxazole-trimethoprim (BACTRIM DS) 800-160 MG tablet Take 1 tablet by mouth 2 times daily for 2 days For 7 days    Associated Diagnoses: Olecranon bursitis of right elbow           CONTINUE these medications which have NOT CHANGED    Details   acetaminophen (TYLENOL) 500 MG tablet Take 1,000 mg by mouth every 8 hours as needed      acetylcysteine (MUCOMYST) 20 % neb solution Take 2  mLs by nebulization 4 times daily    Associated Diagnoses: Chronic obstructive pulmonary disease with acute exacerbation (H)      albuterol (ACCUNEB) 1.25 MG/3ML neb solution Take 1 vial (1.25 mg) by nebulization every 4 hours as needed for shortness of breath or wheezing  Qty: 90 mL, Refills: 4    Associated Diagnoses: COPD exacerbation (H)      albuterol (PROAIR HFA/PROVENTIL HFA/VENTOLIN HFA) 108 (90 Base) MCG/ACT inhaler INHALE 2 PUFFS BY MOUTH EVERY 6 HOURS AS NEEDED FOR WHEEZE OR FOR SHORTNESS OF BREATH  Qty: 18 g, Refills: 3    Associated Diagnoses: Chronic obstructive pulmonary disease with (acute) exacerbation (H)      alendronate (FOSAMAX) 70 MG tablet Take 1 tablet (70 mg) by mouth every 7 days  Qty: 12 tablet, Refills: 3    Associated Diagnoses: Senile osteoporosis      amLODIPine (NORVASC) 5 MG tablet Take 1 tablet (5 mg) by mouth at bedtime  Qty: 90 tablet, Refills: 3    Associated Diagnoses: Benign essential hypertension      ascorbic acid 1000 MG TABS tablet Take 2,000 mg by mouth daily      aspirin 81 MG EC tablet Take 81 mg by mouth 2 times daily    Associated Diagnoses: Closed fracture of neck of right femur, initial encounter (H)      calcium carbonate (OS-KARLIE) 1500 (600 Ca) MG tablet Take 600 mg by mouth daily      carboxymethylcellulose PF (REFRESH PLUS) 0.5 % ophthalmic solution Place 1 drop into both eyes every hour as needed for dry eyes    Associated Diagnoses: Chronic obstructive pulmonary disease with acute exacerbation (H)      cholecalciferol 25 MCG (1000 UT) TABS Take 1,000 Units by mouth daily       furosemide (LASIX) 20 MG tablet Take 1-2 tablets (20-40 mg) by mouth every other day Give 40 mg by mouth  every other day for COPD AND Give 20 mg by mouth very other day HTN,  Qty: 90 tablet, Refills: 3    Associated Diagnoses: Chronic obstructive pulmonary disease with acute exacerbation (H)      gabapentin (NEURONTIN) 400 MG capsule Take 1 capsule (400 mg) by mouth 3 times daily  Qty:  270 capsule, Refills: 3    Associated Diagnoses: Chronic obstructive pulmonary disease with acute exacerbation (H)      hydrocortisone 2.5 % cream Apply topically 2 times daily as needed for itching    Associated Diagnoses: Pruritic rash      KLOR-CON 20 MEQ CR tablet TAKE 1 TABLET BY MOUTH 2 TIMES DAILY  Qty: 180 tablet, Refills: 3    Associated Diagnoses: Hypokalemia      levothyroxine (SYNTHROID/LEVOTHROID) 125 MCG tablet Take 1 tablet (125 mcg) by mouth daily  Qty: 90 tablet, Refills: 3    Associated Diagnoses: Postsurgical hypothyroidism; Papillary thyroid carcinoma (H)      MegaRed Omega-3 Krill Oil 500 MG CAPS Take 500 mg by mouth daily    Comments: Patient reported. OTC.      Melatonin 10 MG TABS tablet Take 10 mg by mouth nightly as needed    Comments: Patient reported. OTC.      metoprolol succinate ER (TOPROL XL) 50 MG 24 hr tablet Take 1 tablet (50 mg) by mouth every evening    Associated Diagnoses: Chronic obstructive pulmonary disease with acute exacerbation (H)      multivitamin w/minerals (THERA-VIT-M) tablet Take 1 tablet by mouth daily       nitroGLYcerin (NITROSTAT) 0.4 MG sublingual tablet FOR CHEST PAIN PLACE 1 TAB UNDER TONGUE EVERY 5 MIN FOR 3 DOSES. IF SYMPTOMS PERSIST 5 MIN AFTER 1ST DOSE CALL 911  Qty: 25 tablet, Refills: 3    Comments: DX Code Needed  PLEASE SEND PT STATES ONES HE HAS ARE CLOSE TO .  Associated Diagnoses: Coronary artery disease involving native coronary artery of native heart without angina pectoris      predniSONE (DELTASONE) 1 MG tablet Take 2 tablets (2 mg) by mouth daily For COPD  Qty: 60 tablet, Refills: 4    Associated Diagnoses: COPD, very severe (H)      rosuvastatin (CRESTOR) 10 MG tablet TAKE 1 TABLET BY MOUTH EVERY DAY  Qty: 90 tablet, Refills: 3    Associated Diagnoses: Hyperlipidemia, unspecified      Fluticasone-Umeclidin-Vilanterol (TRELEGY ELLIPTA) 200-62.5-25 MCG/ACT oral inhaler Inhale 1 puff into the lungs daily In AM. Rinse mouth after use.  Qty:  "60 each, Refills: 11    Associated Diagnoses: Other emphysema (H); Chronic bronchitis, unspecified chronic bronchitis type (H); Radiation pneumonitis (H24); Mass of upper lobe of right lung; Obstructive sleep apnea; Abnormal chest CT; Pneumonia due to infectious organism, unspecified laterality, unspecified part of lung           STOP taking these medications       cyclobenzaprine (FLEXERIL) 5 MG tablet Comments:   Reason for Stopping:         fluticasone (ARNUITY ELLIPTA) 100 MCG/ACT inhaler Comments:   Reason for Stopping:         Fluticasone-Umeclidin-Vilant (TRELEGY ELLIPTA) 100-62.5-25 MCG/ACT oral inhaler Comments:   Reason for Stopping:         ipratropium - albuterol 0.5 mg/2.5 mg/3 mL (DUONEB) 0.5-2.5 (3) MG/3ML neb solution Comments:   Reason for Stopping:         oxyCODONE (ROXICODONE) 5 MG tablet Comments:   Reason for Stopping:         polyethylene glycol (MIRALAX) 17 GM/Dose powder Comments:   Reason for Stopping:             Allergies   Allergies   Allergen Reactions    Levaquin Swelling and Difficulty breathing     Thinks it may have been anaphylaxis    Cats     Dogs     Atorvastatin Calcium Muscle Pain (Myalgia)     Muscle cramps/pain    Clopidogrel Bisulfate Other (See Comments)     Does not remember reaction; may have been \"blotchy skin\"    Hctz [Hydrochlorothiazide] Rash     Rash on legs    Sulfasalazine Cramps     Stomach cramps      Data   Most Recent 3 CBC's:  Recent Labs   Lab Test 04/27/24  0804 04/26/24  1803 04/22/24  1151   WBC 6.7 7.9 7.1   HGB 13.8 13.7 14.7   MCV 90 89 91    199 179      Most Recent 3 BMP's:  Recent Labs   Lab Test 04/27/24  0804 04/26/24  1803 04/22/24  1151    136 141   POTASSIUM 4.6 4.3 4.7   CHLORIDE 105 101 104   CO2 23 26 27   BUN 14.1 18.7 12.1   CR 1.06 1.31* 0.98   ANIONGAP 12 9 10   KARLIE 8.6* 9.1 9.5   GLC 85 84 105*     Most Recent 2 LFT's:  Recent Labs   Lab Test 04/22/24  1151 12/08/23  1158   AST 19 11   ALT 18 19   ALKPHOS 83 155*   BILITOTAL " 0.6 0.4     Most Recent INR's and Anticoagulation Dosing History:  Anticoagulation Dose History  More data exists         Latest Ref Rng & Units 6/2/2009 6/4/2009 7/2/2021 1/21/2022 2/11/2022 4/21/2023 6/13/2023   Recent Dosing and Labs   INR 0.85 - 1.15 1.00  1.00  0.99  1.18  1.03  0.94  1.09      Most Recent 3 Troponin's:  Recent Labs   Lab Test 02/24/19  2119   TROPI <0.015     Most Recent Cholesterol Panel:  Recent Labs   Lab Test 04/26/24  1803   CHOL 124   LDL 65   HDL 41   TRIG 89     Most Recent 6 Bacteria Isolates From Any Culture (See EPIC Reports for Culture Details):  Recent Labs   Lab Test 04/05/19  1040 04/01/19  1124 03/26/19  1145   CULT >100,000 colonies/mL  mixed urogenital ana luisa    Susceptibility testing not routinely done >100,000 colonies/mL  mixed urogenital ana luisa   >100,000 colonies/mL  urogenital ana luisa  Susceptibility testing not routinely done    10,000 to 50,000 colonies/mL  Coagulase negative Staphylococcus  *     Most Recent TSH, T4 and A1c Labs:  Recent Labs   Lab Test 04/26/24  1803 04/22/24  1151 12/07/23  1257   TSH 1.80 4.78*  --    T4  --  1.97*  --    A1C  --   --  5.4       Results for orders placed or performed during the hospital encounter of 04/26/24   CT Pelvis Bone wo Contrast    Narrative    EXAM: CT PELVIS BONE WO CONTRAST  LOCATION: [facility]  DATE: 4/26/2024    INDICATION: Fall, left hip pain.  COMPARISON: None.  TECHNIQUE: CT scan of the pelvis was performed without IV contrast. Multiplanar reformats were obtained. Dose reduction techniques were used.  CONTRAST: None.    FINDINGS: Postoperative changes of bilateral total hip arthroplasty. The components appear well seated bilaterally. No evidence for fracture. No evidence for reactive osteolysis. Chronic irregularity of the anterior aspect of the intertrochanteric region   of the left hip. This is not an acute finding.    There is a fracture of the left inferior pubic ramus. Even in  retrospect, this is not identified on recent x-ray evaluation 04/17/2024. Additional nondisplaced fracture of the junction of the anterior column of the left acetabulum and left superior pubic   ramus. No additional fractures are identified.    The intrapelvic contents are negative for abnormal mass, free fluid, or lymphadenopathy. There is a benign intramuscular lipoma within the right lateral oblique musculature incompletely visualized on the field-of-view within the abdomen.      Impression    IMPRESSION:  1.  Nondisplaced fractures of the left pubic rami.  2.  Postoperative changes bilateral total hip arthroplasty. Components appear well seated bilaterally.  3.  Chronic irregularity along the anterior aspect of the left intertrochanteric region. This could be related to old injury, but this is not an acute finding.  4.  Incompletely visualized benign-appearing intramuscular lipoma within the right lateral oblique musculature of the abdomen.  5.  Intrapelvic contents negative.   CT Lumbar Spine w/o Contrast    Narrative    EXAM: CT LUMBAR SPINE W/O CONTRAST  LOCATION: [facility]  DATE: 4/26/2024    INDICATION: Low back pain in midline, recent fall.  COMPARISON: X-ray 03/19/2024.  TECHNIQUE: Routine CT Lumbar Spine without IV contrast. Multiplanar reformats. Dose reduction techniques were used.     FINDINGS:  VERTEBRA: No acute fracture or posttraumatic subluxation. Moderate chronic loss of height at L2, unchanged from x-ray of 03/19/2024. Grade 1 anterolisthesis L4-L5. Minor dextrocurvature. Osteopenic bones.    CANAL/FORAMINA: At L2-L3, moderate central, high-grade bilateral foraminal stenosis. At L3-L4, severe central and moderate bilateral foraminal stenosis. At L4-L5, severe central and moderate bilateral foraminal stenosis.    PARASPINAL: No extraspinal abnormality.      Impression    IMPRESSION:  1.  No acute fracture or posttraumatic subluxation.  2.  Chronic moderate compression  deformity of L2.     *Note: Due to a large number of results and/or encounters for the requested time period, some results have not been displayed. A complete set of results can be found in Results Review.

## 2024-04-28 NOTE — PROGRESS NOTES
PRIMARY Concern: L pubic rami fx  SAFETY RISK Concerns (fall risk, behaviors, etc.): Fall Risk      Isolation/Type: n/a  Tests/Procedures for NEXT shift: n/a  Consults? (Pending/following, signed-off?) Orthopedic sign off, PT/OT  Where is patient from? (Home, TCU, etc.): Home  Other Important info for NEXT shift: n/a  Anticipated DC date & active delays: 4/28 [facility]  _____________________________________________________________________________  SUMMARY NOTE:  Orientation/Cognitive: A&Ox4  Observation Goals (Met/ Not Met): Partially Met  Mobility Level/Assist Equipment: A-1 GB/walker  Antibiotics & Plan (IV/po, length of tx left): Bactrim   Pain Management: Scheduled robaxin, prn flexeril  Tele/VS/O2: VSS on 1L NC baseline for pt  ABNL Lab/BG: n/a  Diet: Reg  Bowel/Bladder: Cont. B&B  Skin Concerns: scattered bruising and scraps  Drains/Devices: IV SL  Patient Stated Goal for Today:

## 2024-04-28 NOTE — PROGRESS NOTES
Pt discharged. IV removed. Pt discharged with belongings and home medications. [facility]

## 2024-04-28 NOTE — PLAN OF CARE
Occupational Therapy: Orders received. Chart reviewed and discussed with care team.? Occupational Therapy not indicated due to per chart review, pt transferring to TCU. Defer OT to next level of care. Will complete orders.
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
## 2024-04-28 NOTE — PLAN OF CARE
Goal Outcome Evaluation:      Plan of Care Reviewed With: patient    Overall Patient Progress: improvingOverall Patient Progress: improving                    PRIMARY Concern: L pubic rami fx  SAFETY RISK Concerns (fall risk, behaviors, etc.): Fall Risk      Isolation/Type: n/a  Tests/Procedures for NEXT shift: n/a  Consults? (Pending/following, signed-off?) Orthopedic sign off, PT/OT  Where is patient from? (Home, TCU, etc.): Home  Other Important info for NEXT shift: n/a  Anticipated DC date & active delays: 4/28 [facility]  _____________________________________________________________________________  SUMMARY NOTE:  Orientation/Cognitive: A&Ox4  Observation Goals (Met/ Not Met): Met  Mobility Level/Assist Equipment: SBA GB/walker  Antibiotics & Plan (IV/po, length of tx left): Bactrim   Pain Management: Scheduled robaxin  Tele/VS/O2: VSS on 1L NC baseline for pt  ABNL Lab/BG: n/a  Diet: Reg  Bowel/Bladder: Cont. B&B  Skin Concerns: scattered bruising & scabs  Drains/Devices: IV SL  Patient Stated Goal for Today:              Observation goals  PRIOR TO DISCHARGE       Comments: -diagnostic tests and consults completed and resulted- Met  -vital signs normal or at patient baseline- Met  Nurse to notify provider when observation goals have been met and patient is ready for discharge.

## 2024-04-28 NOTE — PLAN OF CARE
Goal Outcome Evaluation:      Observation goals  PRIOR TO DISCHARGE        Comments: -diagnostic tests and consults completed and resulted- Met  -vital signs normal or at patient baseline- Met  Nurse to notify provider when observation goals have been met and patient is ready for discharge.

## 2024-04-29 ENCOUNTER — TRANSITIONAL CARE UNIT VISIT (OUTPATIENT)
Dept: GERIATRICS | Facility: CLINIC | Age: 79
End: 2024-04-29
Payer: MEDICARE

## 2024-04-29 ENCOUNTER — PATIENT OUTREACH (OUTPATIENT)
Dept: CARE COORDINATION | Facility: CLINIC | Age: 79
End: 2024-04-29

## 2024-04-29 VITALS
RESPIRATION RATE: 16 BRPM | SYSTOLIC BLOOD PRESSURE: 117 MMHG | DIASTOLIC BLOOD PRESSURE: 70 MMHG | OXYGEN SATURATION: 91 % | TEMPERATURE: 98.3 F | HEIGHT: 61 IN | WEIGHT: 173.4 LBS | BODY MASS INDEX: 32.74 KG/M2 | HEART RATE: 62 BPM

## 2024-04-29 DIAGNOSIS — M25.552 HIP PAIN, LEFT: Primary | ICD-10-CM

## 2024-04-29 DIAGNOSIS — G47.00 INSOMNIA, UNSPECIFIED TYPE: ICD-10-CM

## 2024-04-29 DIAGNOSIS — Z71.89 ADVANCED DIRECTIVES, COUNSELING/DISCUSSION: ICD-10-CM

## 2024-04-29 DIAGNOSIS — I10 ESSENTIAL HYPERTENSION: ICD-10-CM

## 2024-04-29 DIAGNOSIS — M70.31 BURSITIS OF RIGHT ELBOW, UNSPECIFIED BURSA: ICD-10-CM

## 2024-04-29 DIAGNOSIS — N17.9 AKI (ACUTE KIDNEY INJURY) (H): ICD-10-CM

## 2024-04-29 DIAGNOSIS — K59.00 CONSTIPATION, UNSPECIFIED CONSTIPATION TYPE: ICD-10-CM

## 2024-04-29 DIAGNOSIS — M05.79 RHEUMATOID ARTHRITIS INVOLVING MULTIPLE SITES WITH POSITIVE RHEUMATOID FACTOR (H): ICD-10-CM

## 2024-04-29 DIAGNOSIS — J44.9 COPD, SEVERE (H): ICD-10-CM

## 2024-04-29 DIAGNOSIS — R53.81 PHYSICAL DECONDITIONING: ICD-10-CM

## 2024-04-29 DIAGNOSIS — E78.5 DYSLIPIDEMIA: ICD-10-CM

## 2024-04-29 DIAGNOSIS — I25.10 CORONARY ARTERY DISEASE INVOLVING NATIVE CORONARY ARTERY OF NATIVE HEART WITHOUT ANGINA PECTORIS: ICD-10-CM

## 2024-04-29 DIAGNOSIS — G89.4 CHRONIC PAIN DISORDER: ICD-10-CM

## 2024-04-29 DIAGNOSIS — E89.0 POSTABLATIVE HYPOTHYROIDISM: ICD-10-CM

## 2024-04-29 DIAGNOSIS — S32.592D CLOSED FRACTURE OF LEFT INFERIOR PUBIC RAMUS WITH ROUTINE HEALING, SUBSEQUENT ENCOUNTER: ICD-10-CM

## 2024-04-29 DIAGNOSIS — G47.33 OSA (OBSTRUCTIVE SLEEP APNEA): ICD-10-CM

## 2024-04-29 DIAGNOSIS — Z91.81 PERSONAL HISTORY OF FALL: ICD-10-CM

## 2024-04-29 DIAGNOSIS — R91.8 PULMONARY NODULES: ICD-10-CM

## 2024-04-29 PROCEDURE — 36415 COLL VENOUS BLD VENIPUNCTURE: CPT | Performed by: NURSE PRACTITIONER

## 2024-04-29 PROCEDURE — 99310 SBSQ NF CARE HIGH MDM 45: CPT | Performed by: NURSE PRACTITIONER

## 2024-04-29 PROCEDURE — 86481 TB AG RESPONSE T-CELL SUSP: CPT | Performed by: NURSE PRACTITIONER

## 2024-04-29 NOTE — PLAN OF CARE
"Physical Therapy Discharge Summary    Reason for therapy discharge:    Discharged to transitional care facility.    Progress towards therapy goal(s). See goals on Care Plan in [facility] electronic health record for goal details.  Goals not met.  Barriers to achieving goals:   discharge from facility.    Therapy recommendation(s):    Continued therapy is recommended.  Rationale/Recommendations:  Per last treating therapist note: \"Pt below baseline mobility. Currently needing A x1 to safely ambulate w/ 4WW. Has been having difficulty managing at home. Anticipate when medically ready Pt will need TCU to improve upon functional mobility and strengthening\".      "

## 2024-04-29 NOTE — PROGRESS NOTES
Clinic Care Coordination Contact  Care Coordination Transition Communication    Clinical Data: Patient was hospitalized at [facility] from 4/26/2024 to 4/28/2024 with diagnosis of Mechanical fall, Non-displaced fracture of the left pubic rami, COPD, CAD, HTN.     Assessment: Patient has transitioned to TCU/ARU for short term rehabilitation:    Facility Name: [facility]   Transition Communication:  Notified facility of Primary Care- Care Coordination support via Epic fax.    Plan: Care Coordinator will await notification from facility staff informing of patient's discharge plans/needs. Care Coordinator will review chart and outreach to facility staff every 4 weeks and as needed.      Lia Segal RN, BSN, PHN  Primary Care / Care Coordinator   [facility]  E-mail [email or web address]   [phone]

## 2024-04-29 NOTE — PROGRESS NOTES
Connected Care Resource Center: [facility]    Background: Transitional Care Management program identified per system criteria and reviewed by [facility] team for possible outreach.    Assessment: Upon chart review, CCR[facility] Team member will not proceed with patient outreach related to this episode of Transitional Care Management program due to reason below:    [facility] TCU: Patient discharged to TCU/ARU/LTACH and is established within [facility] Primary [facility]. Referral created for Primary Care-Care Coordination program.    Plan: Transitional Care Management episode addressed appropriately per reason noted above.      Pinky Finch RN  Connected [facility] Resource Center, [facility]    *Connected Care Resource Team does NOT follow patient ongoing. Referrals are identified based on internal discharge reports and the outreach is to ensure patient has an understanding of their discharge instructions.
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
## 2024-04-29 NOTE — LETTER
[facility]   To:   [facility]           Please give to facility    From:   Lia Segal RN  Care Coordinator   [facility]   P: [phone]  [email or web address]   Patient Name:  Harrison Thomas YOB: 1945   Admit date: 4/28/2024      *Information Needed:  Please contact me when the patient will discharge (or if they will move to long term care)- include the discharge date, disposition, and main diagnosis   If the patient is discharged with home care services, please provide the name of the agency    Also- Please inform me if a care conference is being held.   Phone, Fax or Email with information      Lia Segal RN, BSN, PHN  Primary Care / Care Coordinator   [facility]  E-mail [email or web address]   [phone]                Thank you

## 2024-04-30 ENCOUNTER — LAB REQUISITION (OUTPATIENT)
Dept: LAB | Facility: CLINIC | Age: 79
End: 2024-04-30

## 2024-04-30 ENCOUNTER — DOCUMENTATION ONLY (OUTPATIENT)
Dept: OTHER | Facility: CLINIC | Age: 79
End: 2024-04-30
Payer: MEDICARE

## 2024-04-30 DIAGNOSIS — Z00.01 ENCOUNTER FOR GENERAL ADULT MEDICAL EXAMINATION WITH ABNORMAL FINDINGS: ICD-10-CM

## 2024-04-30 LAB
GAMMA INTERFERON BACKGROUND BLD IA-ACNC: 0.03 IU/ML
M TB IFN-G BLD-IMP: NEGATIVE
M TB IFN-G CD4+ BCKGRND COR BLD-ACNC: 9.97 IU/ML
MITOGEN IGNF BCKGRD COR BLD-ACNC: -0.01 IU/ML
MITOGEN IGNF BCKGRD COR BLD-ACNC: 0.01 IU/ML
QUANTIFERON MITOGEN: 10 IU/ML
QUANTIFERON NIL TUBE: 0.03 IU/ML
QUANTIFERON TB1 TUBE: 0.02 IU/ML
QUANTIFERON TB2 TUBE: 0.04

## 2024-05-01 ENCOUNTER — TELEPHONE (OUTPATIENT)
Dept: FAMILY MEDICINE | Facility: CLINIC | Age: 79
End: 2024-05-01

## 2024-05-01 ENCOUNTER — TRANSITIONAL CARE UNIT VISIT (OUTPATIENT)
Dept: GERIATRICS | Facility: CLINIC | Age: 79
End: 2024-05-01
Payer: MEDICARE

## 2024-05-01 ENCOUNTER — DOCUMENTATION ONLY (OUTPATIENT)
Dept: GERIATRICS | Facility: CLINIC | Age: 79
End: 2024-05-01

## 2024-05-01 VITALS
HEART RATE: 61 BPM | SYSTOLIC BLOOD PRESSURE: 123 MMHG | RESPIRATION RATE: 18 BRPM | OXYGEN SATURATION: 94 % | TEMPERATURE: 98.2 F | WEIGHT: 173.2 LBS | DIASTOLIC BLOOD PRESSURE: 70 MMHG | HEIGHT: 61 IN | BODY MASS INDEX: 32.7 KG/M2

## 2024-05-01 DIAGNOSIS — K59.00 CONSTIPATION, UNSPECIFIED CONSTIPATION TYPE: ICD-10-CM

## 2024-05-01 DIAGNOSIS — S32.9XXA CLOSED NONDISPLACED FRACTURE OF PELVIS, UNSPECIFIED PART OF PELVIS, INITIAL ENCOUNTER (H): ICD-10-CM

## 2024-05-01 DIAGNOSIS — G47.33 OSA (OBSTRUCTIVE SLEEP APNEA): ICD-10-CM

## 2024-05-01 DIAGNOSIS — M05.79 RHEUMATOID ARTHRITIS INVOLVING MULTIPLE SITES WITH POSITIVE RHEUMATOID FACTOR (H): ICD-10-CM

## 2024-05-01 DIAGNOSIS — J44.9 COPD, SEVERE (H): ICD-10-CM

## 2024-05-01 DIAGNOSIS — E89.0 POSTABLATIVE HYPOTHYROIDISM: ICD-10-CM

## 2024-05-01 DIAGNOSIS — G89.4 CHRONIC PAIN DISORDER: ICD-10-CM

## 2024-05-01 DIAGNOSIS — Z91.81 PERSONAL HISTORY OF FALL: ICD-10-CM

## 2024-05-01 DIAGNOSIS — I10 ESSENTIAL HYPERTENSION: ICD-10-CM

## 2024-05-01 DIAGNOSIS — M25.552 HIP PAIN, LEFT: Primary | ICD-10-CM

## 2024-05-01 DIAGNOSIS — S32.592D CLOSED FRACTURE OF LEFT INFERIOR PUBIC RAMUS WITH ROUTINE HEALING, SUBSEQUENT ENCOUNTER: ICD-10-CM

## 2024-05-01 DIAGNOSIS — R91.8 PULMONARY NODULES: ICD-10-CM

## 2024-05-01 DIAGNOSIS — G47.00 INSOMNIA, UNSPECIFIED TYPE: ICD-10-CM

## 2024-05-01 DIAGNOSIS — N17.9 AKI (ACUTE KIDNEY INJURY) (H): ICD-10-CM

## 2024-05-01 DIAGNOSIS — R53.81 PHYSICAL DECONDITIONING: ICD-10-CM

## 2024-05-01 DIAGNOSIS — I25.10 CORONARY ARTERY DISEASE INVOLVING NATIVE CORONARY ARTERY OF NATIVE HEART WITHOUT ANGINA PECTORIS: ICD-10-CM

## 2024-05-01 DIAGNOSIS — M70.31 BURSITIS OF RIGHT ELBOW, UNSPECIFIED BURSA: ICD-10-CM

## 2024-05-01 DIAGNOSIS — E78.5 DYSLIPIDEMIA: ICD-10-CM

## 2024-05-01 LAB
ANION GAP SERPL CALCULATED.3IONS-SCNC: 12 MMOL/L (ref 7–15)
BUN SERPL-MCNC: 14 MG/DL (ref 8–23)
CALCIUM SERPL-MCNC: 9 MG/DL (ref 8.8–10.2)
CHLORIDE SERPL-SCNC: 104 MMOL/L (ref 98–107)
CREAT SERPL-MCNC: 1.13 MG/DL (ref 0.67–1.17)
DEPRECATED HCO3 PLAS-SCNC: 24 MMOL/L (ref 22–29)
EGFRCR SERPLBLD CKD-EPI 2021: 67 ML/MIN/1.73M2
GLUCOSE SERPL-MCNC: 76 MG/DL (ref 70–99)
POTASSIUM SERPL-SCNC: 4.4 MMOL/L (ref 3.4–5.3)
SODIUM SERPL-SCNC: 140 MMOL/L (ref 135–145)

## 2024-05-01 PROCEDURE — P9604 ONE-WAY ALLOW PRORATED TRIP: HCPCS | Performed by: NURSE PRACTITIONER

## 2024-05-01 PROCEDURE — 99309 SBSQ NF CARE MODERATE MDM 30: CPT | Performed by: NURSE PRACTITIONER

## 2024-05-01 PROCEDURE — 36415 COLL VENOUS BLD VENIPUNCTURE: CPT | Performed by: NURSE PRACTITIONER

## 2024-05-01 PROCEDURE — 80048 BASIC METABOLIC PNL TOTAL CA: CPT | Performed by: NURSE PRACTITIONER

## 2024-05-01 RX ORDER — HYDROMORPHONE HYDROCHLORIDE 2 MG/1
2-3 TABLET ORAL EVERY 4 HOURS PRN
Qty: 30 TABLET | Refills: 0 | Status: SHIPPED | OUTPATIENT
Start: 2024-05-01 | End: 2024-05-01

## 2024-05-01 NOTE — PROGRESS NOTES
"[facility] GERIATRICS    Chief Complaint   Patient presents with    RECHECK     HPI:  Harrison Thomas is a 78 year old  (1945), who is being seen today for an episodic care visit at: [facility] (TCU) [[id number]].     Per recent TCU provider progress notes:   78 year old male PMH COPD, CAD, chronic pain syndrome, HTN, HLD hospitalized after fall on 4/16 and ongoing severe hip pain. Initial imaging negative for fractures, sent home with Flexeril and oxycodone PRN. Due to ongoing pain seen outpatient, CT of left femur showed mildly displaced inferior pubic rami comminuted fracture, suspect nondisplaced pubic root fracture. Pain now managed with Dilaudid, robaxin, tylenol. WBAT. Due to insomnia added trazodone. CALLUM with Cr 0.9 on baseline, up to 1.31 on 4/26 and given one liter NS, Cr 1.06 at discharge. COPD on steroids, Breo and duo nebs. Recent right elbow olecranon bursitis/effusion: completed doxycycline and keflex, improved. CAD no chest pain; on ASA, EF on 2023 was 60-65%. HTN on Norvasc, Toprol XL. HLD on statin. BRENNEN uses nocturnal oxygen, intolerant of CPAP. Chronic back pain with sciatica on Neurontin. RA multiple sites and positive rheumatoid factory without symptoms or treatment at this time. Hx lung mass. Underwent radiation in 2022. Hx thyroid cancer s/o total thyroidectomy 2021 and radioactive iodine. To TCU for rehab.     Today's concern is: episodic follow-up pain, mobility, vs, labs. Reports ongoing hip and leg pain - not well managed. Will increase pain meds as below. No headaches, dizziness, chest pain, dyspnea, bowel or bladder issues. BP range 120-142/68-72 and sats 91% on oxygen 1 lpm per NC. Walks 10 ft with 2WW.     Allergies, and PMH/PSH reviewed in [facility] today.  REVIEW OF SYSTEMS:  4 point ROS including Respiratory, CV, GI and , other than that noted in the HPI,  is negative    Objective:   /70   Pulse 61   Temp 98.2  F (36.8  C)   Resp 18   Ht 1.549 m (5' 1\")   Wt " 78.6 kg (173 lb 3.2 oz)   SpO2 94%   BMI 32.73 kg/m    GENERAL APPEARANCE:  Alert, in no distress, pleasant, cooperative, oriented x 4  EYES:  EOM, lids, pupils and irises normal, sclera clear and conjunctiva normal, no discharge or mattering on lids or lashes noted  ENT:  Mouth normal, moist mucous membranes, nose normal without drainage or crusting, external ears without lesions, hearing acuity intact  RESP:  respiratory effort normal, no chest wall tenderness, no respiratory distress, Lung sounds diminished throughout, patient is on oxygen   CV:  Auscultation of heart done, rate and rhythm controlled and regular, no murmur, no rub or gallop. Edema none bilateral lower extremities  ABDOMEN:  normal bowel sounds, soft, nontender, no palpable masses.  M/S:   Gait and station walks with assist , no tenderness or swelling of the joints; able to move all extremities, digits normal  NEURO: cranial nerves 2-12 grossly intact, no facial asymmetry, no speech deficits and able to follow directions, moves all extremities symmetrically  PSYCH:  insight and judgement and memory appear intact, affect and mood normal     Most Recent 3 CBC's:  Recent Labs   Lab Test 04/27/24  0804 04/26/24  1803 04/22/24  1151   WBC 6.7 7.9 7.1   HGB 13.8 13.7 14.7   MCV 90 89 91    199 179     Most Recent 3 BMP's:  Recent Labs   Lab Test 05/01/24  0648 04/27/24  0804 04/26/24  1803    140 136   POTASSIUM 4.4 4.6 4.3   CHLORIDE 104 105 101   CO2 24 23 26   BUN 14.0 14.1 18.7   CR 1.13 1.06 1.31*   ANIONGAP 12 12 9   KARLIE 9.0 8.6* 9.1   GLC 76 85 84       Assessment/Plan:  Hip pain, left  Closed fracture of left inferior pubic ramus with routine healing, subsequent encounter  Personal history of fall  Physical deconditioning  Chronic pain disorder  Acute on chronic. Due to pain changed tylenol to 1000 mg PO TID. Continue fosamax 70 mg weekly, calcium carbonate 600 mg daily, vit D 1000 units daily, Due to poorly controlled pain change  gabapentin to 600 mg TID, change dilaudid 2-3 mg every 4 hrs PRN. Continue robaxin 500 mg QID, monitor for effectiveness. Therapies eval and treat. Ortho follow-up as recommended.     Insomnia, unspecified type  Continue melatonin 10 mg HS PRN. Staff to update provider if not effective.     CALLUM (acute kidney injury) (H24)  Acute, last Cr 1.06. Checked BMP on 5/1 and Cr 1.13. Avoid nephrotoxins, check BMP PRN.     COPD, severe (H)  BRENNEN (obstructive sleep apnea)  Chronic. Continue mucomyst 20% neb QID, albuterol neb every 4 hrs PRN, albuterol inhaler PRN, trelegy ellipta 200-62.5-25 mcg/act 1 puff daily, prednisone 2 mg daily, monitor respiratory status.     Bursitis of right elbow, unspecified bursa  Acute, improved, completed antibiotics. Monitor symptoms.     Coronary artery disease involving native coronary artery of native heart without angina pectoris  Essential hypertension  Dyslipidemia  Chronic, continue Norvasc 5 mg daily, asa 81 mg BID, lasix 20 mg QOD alternating with 40 mg QOD, Toprol XL 50 mg daily, NTG as needed, Crestor 10 mg daily, monitor vs and review ranges next visit.     Rheumatoid arthritis involving multiple sites with positive rheumatoid factor (H)  By history. Not on medications. Monitor symptoms.     Postablative hypothyroidism  Continue PTA synthroid 125 mcg daily.     Pulmonary nodules  By history, s/p radiation. F/u per outpatient routine.     Constipation, unspecified constipation type  Continue senna s 1 tab BID. Staff to update provider if not effective.     MED REC REQUIRED  Post Medication Reconciliation Status: discharge medications reconciled and changed, per note/orders    Orders:  Change dilaudid to 2-3 mg PO every 4 hrs PRN pain  Change Neurontin to 600 mg PO TID diagnosis pain    Electronically signed by: PREETI Hanks CNP

## 2024-05-02 DIAGNOSIS — S32.592A CLOSED FRACTURE OF LEFT INFERIOR PUBIC RAMUS, INITIAL ENCOUNTER (H): Primary | ICD-10-CM

## 2024-05-02 NOTE — TELEPHONE ENCOUNTER
Called and spoke to the patient. Upon chart review, patient is currently in TCU. Advised patient he has a provider at TCU who is managing his pain. Once patient is discharged, then PCP will take over managing the pain and prescribing mediations as she sees fit.     Advised patient to call the clinic back once he is discharged from TCU. At that time, patient may need to be scheduled for a hospital follow-up appointment.     Patient expressed understanding and had no additional questions at this time.     Analy Gusman RN

## 2024-05-02 NOTE — PROGRESS NOTES
CT Femur Thigh Left w/o Contrast  Order: [id number]  Status: Final result       Visible to patient: No (scheduled for 5/2/2024 11:49 AM)       Dx: Injury of left hip, subsequent encounter    0 Result Notes  Details    Reading Physician Reading Date Result Priority   Erlin Frances MD  [phone] 4/25/2024    Talib Don MD  [phone] 4/25/2024      Narrative & Impression  CT FEMUR THIGH LEFT W/O CONTRAST  4/25/2024 11:14 AM       HISTORY: Injury of left hip, subsequent encounter     TECHNIQUE: Helical acquisition of the left femur without intravenous  contrast.     COMPARISON: 4/17/2024 radiograph     FINDINGS:       images demonstrate contralateral right hip arthroplasty. No  gross abnormality of the right lower extremity.     Beam streak hardware artifact secondary to left hip hemiarthroplasty  partially compromising assessment.     Bones:      Mildly displaced inferior pubic ramus comminuted fracture. Apparent  contour irregularity of the superior pubic ramus/acetabular root,  likely representing area of nondisplaced fracture though beam streak  artifact limits evaluation.     Hip hemiarthroplasty without evidence of periprostatic fracture.  Chronic contour alteration of the proximal femur with prominent bony  proliferation anteriorly at the intertrochanteric/subtrochanteric  area. Mild-to-moderate heterotopic ossification lateral hip.      Enthesopathic change of innominate bones. Degenerative changes of the  knee with chondrocalcinosis. Partially visualized sclerosis of the  proximal tibia, likely enchondroma.     Soft tissues:      Vascular calcifications. Colonic diverticulosis without evidence  diverticulitis. No substantial left hip joint effusion suspected.  Subcutaneous soft tissue stranding especially over the lateral hip,  may be related to contusion. Moderate fatty infiltration of the  gluteus medius laterally. No substantial knee joint effusion.                                                                       IMPRESSION:   1. Mildly displaced inferior pubic ramus comminuted fracture.  2. Suspect non-displaced pubic root fracture though beam streak  artifact limits evaluation.     Contacted Harrison at 9:50 AM on 5/2/2024 to discuss the above results.  It does appear as though on CT, which varied from the x-ray, the patient does have a mild displaced inferior pubic rami comminuted fracture and another area of contour iregularity seen in the superior pubic ramus acetabular root, suggestive of a possible prosthetic fracture there as well.  The prosthetic overall did look appropriate.  However given the patient having a comminuted fracture of the acetabulum/pelvis, in conjunction with chronic left prosthetic pain after LIZABETH, we have discussed sending the patient to one of our hip orthopedic surgeons for evaluation to determine if this truly warrants a surgical discussion.  I have discussed this in great detail with the patient.  Given the fact the patient is having substantial pain and difficulty with walking, he is in agreement with this.  Very likely, as I discussed with the patient, the pelvic fractures may need time to heal with nonweightbearing before any discussion is broached in regards to repeat arthroplasty, but discussing this with one of our surgeons is reasonable.  If none operative management is suggested, I am more than happy to see the patient back to help him through the inferior pubic rami comminuted fracture, and the possible pubic root fracture of the acetabulum.  In the meantime have suggested nonweightbearing until being seen by one of our orthopedic surgeons.  All questions have been answered.  ER and urgent care precautions have been discussed.    Emory Garcia D.O., [facility]  , Sports Medicine  Department of Family Medicine and [facility]

## 2024-05-03 ENCOUNTER — TELEPHONE (OUTPATIENT)
Dept: OTHER | Age: 79
End: 2024-05-03

## 2024-05-03 NOTE — TELEPHONE ENCOUNTER
NO TRIAGE NEEDED as this was a referral from the [facility]. Did have Arthroplasty surgeon review and discussed again with Dr. Garcia.       Scheduled pt with Dr. Garcia

## 2024-05-03 NOTE — TELEPHONE ENCOUNTER
What is the Concern:  Fracture  Date the concern started: Found yesterday  Injury related? no  Is this related to recent surgery?no  Laceration?  No  Body part affected:  L hip  Has Patient been evaluated for condition? no  Location the patient was evaluated and treated for the condition?   No  Who is referring provider, (name and clinic location) Dr. Emory Garcia, [facility]  What images have been done? X-Ray; Location and City where images were taken:  [facility], also had CT    Could we send this information to you [facility] or would you prefer to receive a phone call?:   Patient would prefer a phone call   Okay to leave a detailed message?: Yes at Cell number on file:    Telephone Information:   Mobile [phone]

## 2024-05-07 NOTE — PROGRESS NOTES
[facility] GERIATRICS    Chief Complaint   Patient presents with    RECHECK     HPI:  Harrison Thomas is a 78 year old  (1945), who is being seen today for an episodic care visit at: [facility] (TCU) [[id number]].     Per recent TCU provider progress notes:   78 year old male PMH COPD, CAD, chronic pain syndrome, HTN, HLD hospitalized after fall on 4/16 and ongoing severe hip pain. Initial imaging negative for fractures, sent home with Flexeril and oxycodone PRN. Due to ongoing pain seen outpatient, CT of left femur showed mildly displaced inferior pubic rami comminuted fracture, suspect nondisplaced pubic root fracture. Pain now managed with Dilaudid, robaxin, tylenol. WBAT. Due to insomnia added trazodone. CALLUM with Cr 0.9 on baseline, up to 1.31 on 4/26 and given one liter NS, Cr 1.06 at discharge. COPD on steroids, Breo and duo nebs. Recent right elbow olecranon bursitis/effusion: completed doxycycline and keflex, improved. CAD no chest pain; on ASA, EF on 2023 was 60-65%. HTN on Norvasc, Toprol XL. HLD on statin. BRENNEN uses nocturnal oxygen, intolerant of CPAP. Chronic back pain with sciatica on Neurontin. RA multiple sites and positive rheumatoid factory without symptoms or treatment at this time. Hx lung mass. Underwent radiation in 2022. Hx thyroid cancer s/o total thyroidectomy 2021 and radioactive iodine. To TCU for rehab.     Today's concern is: episodic follow-up pain, mobility, vs, labs. Reports ongoing hip and leg pain - not well managed. Will schedule pain meds as below. Reports insomnia. No headaches, dizziness, chest pain, dyspnea, bowel or bladder issues. BP range 124-170/70-88 and sats 96% on oxygen 1 lpm per NC. Walks 250 ft with 2WW. SLUMS 26/30    Allergies, and PMH/PSH reviewed in [facility] today.  REVIEW OF SYSTEMS:  4 point ROS including Respiratory, CV, GI and , other than that noted in the HPI,  is negative    Objective:   /70   Pulse 80   Temp 97.6  F (36.4  C)   Resp  18   Wt 77.5 kg (170 lb 12.8 oz)   SpO2 96%   BMI 32.27 kg/m    GENERAL APPEARANCE:  Alert, in no distress, pleasant, cooperative, oriented x 4  EYES:  EOM, lids, pupils and irises normal, sclera clear and conjunctiva normal, no discharge or mattering on lids or lashes noted  ENT:  Mouth normal, moist mucous membranes, nose normal without drainage or crusting, external ears without lesions, hearing acuity intact  RESP:  respiratory effort normal, no chest wall tenderness, no respiratory distress, Lung sounds diminished throughout, patient is on oxygen per NC  CV:  Auscultation of heart done, rate and rhythm controlled and regular, no murmur, no rub or gallop. Edema none  M/S:   Gait and station walks with assist, no tenderness or swelling of the joints; able to move all extremities, digits normal  NEURO: cranial nerves 2-12 grossly intact, no facial asymmetry, no speech deficits and able to follow directions, moves all extremities symmetrically  PSYCH:  insight and judgement and memory appear intact, affect and mood normal     Most Recent 3 CBC's:  Recent Labs   Lab Test 04/27/24  0804 04/26/24  1803 04/22/24  1151   WBC 6.7 7.9 7.1   HGB 13.8 13.7 14.7   MCV 90 89 91    199 179     Most Recent 3 BMP's:  Recent Labs   Lab Test 05/01/24  0648 04/27/24  0804 04/26/24  1803    140 136   POTASSIUM 4.4 4.6 4.3   CHLORIDE 104 105 101   CO2 24 23 26   BUN 14.0 14.1 18.7   CR 1.13 1.06 1.31*   ANIONGAP 12 12 9   KARLIE 9.0 8.6* 9.1   GLC 76 85 84       Assessment/Plan:  Hip pain, left  Closed fracture of left inferior pubic ramus with routine healing, subsequent encounter  Personal history of fall  Physical deconditioning  Chronic pain disorder  Acute on chronic. Due to pain changed tylenol to 1000 mg PO TID. Continue fosamax 70 mg weekly, calcium carbonate 600 mg daily, vit D 1000 units daily. Due to poorly controlled pain change gabapentin to 600 mg TID. Change dilaudid to 2 mg BID and 2 mg every 4 hrs PRN.  Continue robaxin 500 mg QID, monitor for effectiveness. Therapies eval and treat. Ortho follow-up as recommended.     Insomnia, unspecified type  Melatonin not effective. Start trazodone 50 mg PO HS. Staff to update provider if not effective.     CALLUM (acute kidney injury) (H24)  Acute, last Cr 1.06. Checked BMP on 5/1 and Cr 1.13. Avoid nephrotoxins, check BMP PRN.     COPD, severe (H)  BRENNEN (obstructive sleep apnea)  Chronic. Continue mucomyst 20% neb QID, albuterol neb every 4 hrs PRN, albuterol inhaler PRN, trelegy ellipta 200-62.5-25 mcg/act 1 puff daily, prednisone 2 mg daily, monitor respiratory status.     Bursitis of right elbow, unspecified bursa  Acute, improved, completed antibiotics. Monitor symptoms.     Coronary artery disease involving native coronary artery of native heart without angina pectoris  Essential hypertension  Dyslipidemia  Chronic, continue Norvasc 5 mg daily, asa 81 mg BID, lasix 20 mg QOD alternating with 40 mg QOD, Toprol XL 50 mg daily, NTG as needed, Crestor 10 mg daily, monitor vs and review ranges next visit.     Rheumatoid arthritis involving multiple sites with positive rheumatoid factor (H)  By history. Not on medications. Monitor symptoms.     Postablative hypothyroidism  Continue PTA synthroid 125 mcg daily.     Pulmonary nodules  By history, s/p radiation. F/u per outpatient routine.     Constipation, unspecified  Regular stools with senna s. Monitor.     MED REC REQUIRED  Post Medication Reconciliation Status: discharge medications reconciled and changed, per note/orders    Orders:  Change dilaudid to 2 mg PO BID and every 4 hrs PRN.   Start trazodone 50 mg PO HS diagnosis insomnia    Electronically signed by: PREETI Hanks CNP

## 2024-05-08 ENCOUNTER — TRANSITIONAL CARE UNIT VISIT (OUTPATIENT)
Dept: GERIATRICS | Facility: CLINIC | Age: 79
End: 2024-05-08
Payer: MEDICARE

## 2024-05-08 VITALS
TEMPERATURE: 97.6 F | HEART RATE: 80 BPM | OXYGEN SATURATION: 96 % | RESPIRATION RATE: 18 BRPM | BODY MASS INDEX: 32.27 KG/M2 | DIASTOLIC BLOOD PRESSURE: 70 MMHG | SYSTOLIC BLOOD PRESSURE: 127 MMHG | WEIGHT: 170.8 LBS

## 2024-05-08 DIAGNOSIS — G47.33 OSA (OBSTRUCTIVE SLEEP APNEA): ICD-10-CM

## 2024-05-08 DIAGNOSIS — S32.592D CLOSED FRACTURE OF LEFT INFERIOR PUBIC RAMUS WITH ROUTINE HEALING, SUBSEQUENT ENCOUNTER: ICD-10-CM

## 2024-05-08 DIAGNOSIS — E89.0 POSTABLATIVE HYPOTHYROIDISM: ICD-10-CM

## 2024-05-08 DIAGNOSIS — R91.8 PULMONARY NODULES: ICD-10-CM

## 2024-05-08 DIAGNOSIS — I10 ESSENTIAL HYPERTENSION: ICD-10-CM

## 2024-05-08 DIAGNOSIS — M70.31 BURSITIS OF RIGHT ELBOW, UNSPECIFIED BURSA: ICD-10-CM

## 2024-05-08 DIAGNOSIS — I25.10 CORONARY ARTERY DISEASE INVOLVING NATIVE CORONARY ARTERY OF NATIVE HEART WITHOUT ANGINA PECTORIS: ICD-10-CM

## 2024-05-08 DIAGNOSIS — M25.552 HIP PAIN, LEFT: Primary | ICD-10-CM

## 2024-05-08 DIAGNOSIS — S32.9XXA CLOSED NONDISPLACED FRACTURE OF PELVIS, UNSPECIFIED PART OF PELVIS, INITIAL ENCOUNTER (H): Primary | ICD-10-CM

## 2024-05-08 DIAGNOSIS — N17.9 AKI (ACUTE KIDNEY INJURY) (H): ICD-10-CM

## 2024-05-08 DIAGNOSIS — M05.79 RHEUMATOID ARTHRITIS INVOLVING MULTIPLE SITES WITH POSITIVE RHEUMATOID FACTOR (H): ICD-10-CM

## 2024-05-08 DIAGNOSIS — Z91.81 PERSONAL HISTORY OF FALL: ICD-10-CM

## 2024-05-08 DIAGNOSIS — J44.9 COPD, SEVERE (H): ICD-10-CM

## 2024-05-08 DIAGNOSIS — G89.4 CHRONIC PAIN DISORDER: ICD-10-CM

## 2024-05-08 DIAGNOSIS — R53.81 PHYSICAL DECONDITIONING: ICD-10-CM

## 2024-05-08 DIAGNOSIS — F51.01 PRIMARY INSOMNIA: ICD-10-CM

## 2024-05-08 DIAGNOSIS — K59.00 CONSTIPATION, UNSPECIFIED CONSTIPATION TYPE: ICD-10-CM

## 2024-05-08 DIAGNOSIS — E78.5 DYSLIPIDEMIA: ICD-10-CM

## 2024-05-08 DIAGNOSIS — G47.00 INSOMNIA, UNSPECIFIED TYPE: ICD-10-CM

## 2024-05-08 PROCEDURE — 99309 SBSQ NF CARE MODERATE MDM 30: CPT | Performed by: NURSE PRACTITIONER

## 2024-05-08 RX ORDER — TRAZODONE HYDROCHLORIDE 50 MG/1
50 TABLET, FILM COATED ORAL AT BEDTIME
Qty: 30 TABLET | Refills: 0 | Status: SHIPPED | OUTPATIENT
Start: 2024-05-08 | End: 2024-05-14

## 2024-05-08 RX ORDER — HYDROMORPHONE HYDROCHLORIDE 2 MG/1
2 TABLET ORAL 2 TIMES DAILY
Qty: 30 TABLET | Refills: 0 | Status: SHIPPED | OUTPATIENT
Start: 2024-05-08 | End: 2024-05-10

## 2024-05-09 NOTE — PROGRESS NOTES
[facility] GERIATRICS    Chief Complaint   Patient presents with    RECHECK     HPI:  Harrison Thomas is a 78 year old  (1945), who is being seen today for an episodic care visit at: [facility] (TCU) [[id number]].     Per recent TCU provider progress notes:   78 year old male PMH COPD, CAD, chronic pain syndrome, HTN, HLD hospitalized after fall on 4/16 and ongoing severe hip pain. Initial imaging negative for fractures, sent home with Flexeril and oxycodone PRN. Due to ongoing pain seen outpatient, CT of left femur showed mildly displaced inferior pubic rami comminuted fracture, suspect nondisplaced pubic root fracture. Pain now managed with Dilaudid, robaxin, tylenol. WBAT. Due to insomnia added trazodone. CALLUM with Cr 0.9 on baseline, up to 1.31 on 4/26 and given one liter NS, Cr 1.06 at discharge. COPD on steroids, Breo and duo nebs. Recent right elbow olecranon bursitis/effusion: completed doxycycline and keflex, improved. CAD no chest pain; on ASA, EF on 2023 was 60-65%. HTN on Norvasc, Toprol XL. HLD on statin. BRENNEN uses nocturnal oxygen, intolerant of CPAP. Chronic back pain with sciatica on Neurontin. RA multiple sites and positive rheumatoid factory without symptoms or treatment at this time. Hx lung mass. Underwent radiation in 2022. Hx thyroid cancer s/o total thyroidectomy 2021 and radioactive iodine. To TCU for rehab.     Today's concern is: episodic follow-up pain, mobility, vs, labs. Reports ongoing hip and leg pain - not well managed. Will increase pain meds as below. No headaches, dizziness, chest pain, dyspnea, bowel or bladder issues. BP range 127-155/69-88 and sats 94% on oxygen 1 lpm per NC. Walks 250 ft with 2WW. SLUMS 26/30.     Allergies, and PMH/PSH reviewed in [facility] today.  REVIEW OF SYSTEMS:  4 point ROS including Respiratory, CV, GI and , other than that noted in the HPI,  is negative    Objective:   /78   Pulse 56   Temp 97.6  F (36.4  C)   Resp 16   Wt 77.1 kg  (170 lb)   SpO2 94%   BMI 32.12 kg/m    GENERAL APPEARANCE:  Alert, in no distress, pleasant, cooperative, oriented x 4  EYES:  EOM, lids, pupils and irises normal, sclera clear and conjunctiva normal, no discharge or mattering on lids or lashes noted  ENT:  Mouth normal, moist mucous membranes, nose normal without drainage or crusting, external ears without lesions, hearing acuity intact  RESP:  respiratory effort normal, no chest wall tenderness, no respiratory distress, Lung sounds diminished throughout, patient is on oxygen   CV:  Auscultation of heart done, rate and rhythm controlled and regular, no murmur, no rub or gallop. Edema none bilateral lower extremities  ABDOMEN:  normal bowel sounds, soft, nontender, no palpable masses.  M/S:   Gait and station walks with assist , no tenderness or swelling of the joints; able to move all extremities, digits normal  NEURO: cranial nerves 2-12 grossly intact, no facial asymmetry, no speech deficits and able to follow directions, moves all extremities symmetrically  PSYCH:  insight and judgement and memory appear intact, affect and mood normal     Most Recent 3 CBC's:  Recent Labs   Lab Test 04/27/24  0804 04/26/24  1803 04/22/24  1151   WBC 6.7 7.9 7.1   HGB 13.8 13.7 14.7   MCV 90 89 91    199 179     Most Recent 3 BMP's:  Recent Labs   Lab Test 05/01/24  0648 04/27/24  0804 04/26/24  1803    140 136   POTASSIUM 4.4 4.6 4.3   CHLORIDE 104 105 101   CO2 24 23 26   BUN 14.0 14.1 18.7   CR 1.13 1.06 1.31*   ANIONGAP 12 12 9   KARLIE 9.0 8.6* 9.1   GLC 76 85 84       Assessment/Plan:  Hip pain, left  Closed fracture of left inferior pubic ramus with routine healing, subsequent encounter  Personal history of fall  Physical deconditioning  Chronic pain disorder  Acute on chronic. Due to pain changed tylenol to 1000 mg PO TID. Continue fosamax 70 mg weekly, calcium carbonate 600 mg daily, vit D 1000 units daily, Due to poorly controlled pain change gabapentin to  600 mg TID, change dilaudid 3 mg BID and every 4 hrs PRN. Continue robaxin 500 mg QID, monitor for effectiveness. Therapies eval and treat. Ortho follow-up as recommended.     Insomnia, unspecified type  Continue melatonin 10 mg HS PRN. Staff to update provider if not effective.     CALLUM (acute kidney injury) (H24)  Acute, last Cr 1.06. Checked BMP on 5/1 and Cr 1.13. Avoid nephrotoxins, check BMP PRN.     COPD, severe (H)  BRENNEN (obstructive sleep apnea)  Chronic. Continue mucomyst 20% neb QID, albuterol neb every 4 hrs PRN, albuterol inhaler PRN, trelegy ellipta 200-62.5-25 mcg/act 1 puff daily, prednisone 2 mg daily, monitor respiratory status.     Bursitis of right elbow, unspecified bursa  Acute, improved, completed antibiotics. Monitor symptoms.     Coronary artery disease involving native coronary artery of native heart without angina pectoris  Essential hypertension  Dyslipidemia  Chronic, continue Norvasc 5 mg daily, asa 81 mg BID, lasix 20 mg QOD alternating with 40 mg QOD, Toprol XL 50 mg daily, NTG as needed, Crestor 10 mg daily, monitor vs and review ranges next visit.     Rheumatoid arthritis involving multiple sites with positive rheumatoid factor (H)  By history. Not on medications. Monitor symptoms.     Postablative hypothyroidism  Continue PTA synthroid 125 mcg daily.     Pulmonary nodules  By history, s/p radiation. F/u per outpatient routine.     Constipation, unspecified constipation type  Continue senna s 1 tab BID. Staff to update provider if not effective.     MED REC REQUIRED  Post Medication Reconciliation Status: discharge medications reconciled and changed, per note/orders    Orders:  Change dilaudid to 3 mg PO BID and every 4 hrs PRN pain    Electronically signed by: PREETI Hanks CNP

## 2024-05-10 ENCOUNTER — TRANSITIONAL CARE UNIT VISIT (OUTPATIENT)
Dept: GERIATRICS | Facility: CLINIC | Age: 79
End: 2024-05-10
Payer: MEDICARE

## 2024-05-10 VITALS
WEIGHT: 170 LBS | OXYGEN SATURATION: 94 % | BODY MASS INDEX: 32.12 KG/M2 | TEMPERATURE: 97.6 F | RESPIRATION RATE: 16 BRPM | DIASTOLIC BLOOD PRESSURE: 78 MMHG | HEART RATE: 56 BPM | SYSTOLIC BLOOD PRESSURE: 136 MMHG

## 2024-05-10 DIAGNOSIS — S32.9XXA CLOSED NONDISPLACED FRACTURE OF PELVIS, UNSPECIFIED PART OF PELVIS, INITIAL ENCOUNTER (H): Primary | ICD-10-CM

## 2024-05-10 DIAGNOSIS — M70.31 BURSITIS OF RIGHT ELBOW, UNSPECIFIED BURSA: ICD-10-CM

## 2024-05-10 DIAGNOSIS — M25.552 HIP PAIN, LEFT: Primary | ICD-10-CM

## 2024-05-10 DIAGNOSIS — E78.5 DYSLIPIDEMIA: ICD-10-CM

## 2024-05-10 DIAGNOSIS — K59.00 CONSTIPATION, UNSPECIFIED CONSTIPATION TYPE: ICD-10-CM

## 2024-05-10 DIAGNOSIS — G89.4 CHRONIC PAIN DISORDER: ICD-10-CM

## 2024-05-10 DIAGNOSIS — G47.33 OSA (OBSTRUCTIVE SLEEP APNEA): ICD-10-CM

## 2024-05-10 DIAGNOSIS — I25.10 CORONARY ARTERY DISEASE INVOLVING NATIVE CORONARY ARTERY OF NATIVE HEART WITHOUT ANGINA PECTORIS: ICD-10-CM

## 2024-05-10 DIAGNOSIS — Z91.81 PERSONAL HISTORY OF FALL: ICD-10-CM

## 2024-05-10 DIAGNOSIS — M05.79 RHEUMATOID ARTHRITIS INVOLVING MULTIPLE SITES WITH POSITIVE RHEUMATOID FACTOR (H): ICD-10-CM

## 2024-05-10 DIAGNOSIS — I10 ESSENTIAL HYPERTENSION: ICD-10-CM

## 2024-05-10 DIAGNOSIS — E89.0 POSTABLATIVE HYPOTHYROIDISM: ICD-10-CM

## 2024-05-10 DIAGNOSIS — N17.9 AKI (ACUTE KIDNEY INJURY) (H): ICD-10-CM

## 2024-05-10 DIAGNOSIS — R91.8 PULMONARY NODULES: ICD-10-CM

## 2024-05-10 DIAGNOSIS — F51.01 PRIMARY INSOMNIA: ICD-10-CM

## 2024-05-10 DIAGNOSIS — R53.81 PHYSICAL DECONDITIONING: ICD-10-CM

## 2024-05-10 DIAGNOSIS — S32.9XXA CLOSED NONDISPLACED FRACTURE OF PELVIS, UNSPECIFIED PART OF PELVIS, INITIAL ENCOUNTER (H): ICD-10-CM

## 2024-05-10 DIAGNOSIS — J44.9 COPD, SEVERE (H): ICD-10-CM

## 2024-05-10 DIAGNOSIS — S32.592D CLOSED FRACTURE OF LEFT INFERIOR PUBIC RAMUS WITH ROUTINE HEALING, SUBSEQUENT ENCOUNTER: ICD-10-CM

## 2024-05-10 PROCEDURE — 99309 SBSQ NF CARE MODERATE MDM 30: CPT | Performed by: NURSE PRACTITIONER

## 2024-05-10 RX ORDER — HYDROMORPHONE HYDROCHLORIDE 2 MG/1
3 TABLET ORAL 2 TIMES DAILY
Qty: 45 TABLET | Refills: 0 | Status: SHIPPED | OUTPATIENT
Start: 2024-05-10 | End: 2024-05-10

## 2024-05-10 RX ORDER — HYDROMORPHONE HYDROCHLORIDE 1 MG/ML
3 SOLUTION ORAL 2 TIMES DAILY
Qty: 473 ML | Refills: 0 | Status: SHIPPED | OUTPATIENT
Start: 2024-05-10 | End: 2024-05-14

## 2024-05-14 ENCOUNTER — DISCHARGE SUMMARY NURSING HOME (OUTPATIENT)
Dept: GERIATRICS | Facility: CLINIC | Age: 79
End: 2024-05-14
Payer: MEDICARE

## 2024-05-14 VITALS
TEMPERATURE: 97.4 F | HEART RATE: 59 BPM | SYSTOLIC BLOOD PRESSURE: 119 MMHG | DIASTOLIC BLOOD PRESSURE: 62 MMHG | OXYGEN SATURATION: 92 % | RESPIRATION RATE: 18 BRPM | BODY MASS INDEX: 24.34 KG/M2 | HEIGHT: 70 IN | WEIGHT: 170 LBS

## 2024-05-14 DIAGNOSIS — K59.00 CONSTIPATION, UNSPECIFIED CONSTIPATION TYPE: ICD-10-CM

## 2024-05-14 DIAGNOSIS — I10 ESSENTIAL HYPERTENSION: ICD-10-CM

## 2024-05-14 DIAGNOSIS — Z91.81 PERSONAL HISTORY OF FALL: ICD-10-CM

## 2024-05-14 DIAGNOSIS — R53.81 PHYSICAL DECONDITIONING: ICD-10-CM

## 2024-05-14 DIAGNOSIS — J44.9 COPD, SEVERE (H): ICD-10-CM

## 2024-05-14 DIAGNOSIS — E89.0 POSTABLATIVE HYPOTHYROIDISM: ICD-10-CM

## 2024-05-14 DIAGNOSIS — S32.592D CLOSED FRACTURE OF LEFT INFERIOR PUBIC RAMUS WITH ROUTINE HEALING, SUBSEQUENT ENCOUNTER: ICD-10-CM

## 2024-05-14 DIAGNOSIS — F51.01 PRIMARY INSOMNIA: ICD-10-CM

## 2024-05-14 DIAGNOSIS — E78.5 DYSLIPIDEMIA: ICD-10-CM

## 2024-05-14 DIAGNOSIS — R91.8 PULMONARY NODULES: ICD-10-CM

## 2024-05-14 DIAGNOSIS — G47.33 OSA (OBSTRUCTIVE SLEEP APNEA): ICD-10-CM

## 2024-05-14 DIAGNOSIS — N17.9 AKI (ACUTE KIDNEY INJURY) (H): ICD-10-CM

## 2024-05-14 DIAGNOSIS — I25.10 CORONARY ARTERY DISEASE INVOLVING NATIVE CORONARY ARTERY OF NATIVE HEART WITHOUT ANGINA PECTORIS: ICD-10-CM

## 2024-05-14 DIAGNOSIS — S32.9XXA CLOSED NONDISPLACED FRACTURE OF PELVIS, UNSPECIFIED PART OF PELVIS, INITIAL ENCOUNTER (H): ICD-10-CM

## 2024-05-14 DIAGNOSIS — G89.4 CHRONIC PAIN DISORDER: ICD-10-CM

## 2024-05-14 DIAGNOSIS — S32.9XXA CLOSED NONDISPLACED FRACTURE OF PELVIS, UNSPECIFIED PART OF PELVIS, INITIAL ENCOUNTER (H): Primary | ICD-10-CM

## 2024-05-14 DIAGNOSIS — M05.79 RHEUMATOID ARTHRITIS INVOLVING MULTIPLE SITES WITH POSITIVE RHEUMATOID FACTOR (H): ICD-10-CM

## 2024-05-14 DIAGNOSIS — M70.31 BURSITIS OF RIGHT ELBOW, UNSPECIFIED BURSA: ICD-10-CM

## 2024-05-14 DIAGNOSIS — M25.552 HIP PAIN, LEFT: ICD-10-CM

## 2024-05-14 PROCEDURE — 99316 NF DSCHRG MGMT 30 MIN+: CPT | Performed by: NURSE PRACTITIONER

## 2024-05-14 RX ORDER — METHOCARBAMOL 500 MG/1
500 TABLET, FILM COATED ORAL 4 TIMES DAILY
Qty: 120 TABLET | Refills: 0 | Status: SHIPPED | OUTPATIENT
Start: 2024-05-14

## 2024-05-14 RX ORDER — TRAZODONE HYDROCHLORIDE 50 MG/1
50 TABLET, FILM COATED ORAL AT BEDTIME
Qty: 30 TABLET | Refills: 0 | Status: SHIPPED | OUTPATIENT
Start: 2024-05-14

## 2024-05-14 RX ORDER — HYDROMORPHONE HYDROCHLORIDE 2 MG/1
2 TABLET ORAL EVERY 6 HOURS PRN
Qty: 15 TABLET | Refills: 0 | Status: SHIPPED | OUTPATIENT
Start: 2024-05-14 | End: 2024-05-24
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
## 2024-05-22 NOTE — PROGRESS NOTES
[facility]  [facility]           SUBJECTIVE       Harrison Thomas is a 78 year old male presenting to clinic today for follow-up of the left hip.  Overall patient is walking now and doing better.  No instability.  Has had chronic pain on the left hip since having the surgery done, LIZABETH.    4/25/24: Injury of left hip, subsequent encounter  -     Orthopedic  Referral  -     CT Femur Thigh Left w/o Contrast; Future  -     Orthopedic  Referral; Future        78-year-old male presenting to clinic today with left hip/anterior thigh pain after a fall.  Patient did have a plethora of images from April 17 which were all relatively negative.  A CT of his thigh was also ordered at that time, but the patient did not get it.  Given the lack of field-of-view from the x-rays going into the area of tenderness on the thigh, I do think a stat CT of the femur is reasonable.  This has been ordered.  Given the fact the patient has had difficulty with his hip replacement/partial since the time he had it, and is reproducible inguinal crease/joint line pain with examination, I have placed a referral to one of our orthopedic hip surgeons for overall discussion.  Patient was made aware that he needs to have a second surgery on his hip, because of that this order has also been placed.  All the patient's questions have been answered.  We will reach out to the patient with any urgent findings that are found on the stat CT.  Patient lives in [place], so him getting back to our site may be a bit difficult.  ER and urgent care precautions have been discussed in great detail.  Return precautions also advised.  In the meantime, I do want the patient using some form of an assistive device with any ambulation.  At home he has a walker, which she did not bring today.  I have counseled him on using this at all times until the CT results have been followed up with  him.    5/2/24:  Narrative & Impression  CT FEMUR THIGH LEFT W/O CONTRAST  4/25/2024 11:14 AM       HISTORY: Injury of left hip, subsequent encounter     TECHNIQUE: Helical acquisition of the left femur without intravenous  contrast.     COMPARISON: 4/17/2024 radiograph     FINDINGS:       images demonstrate contralateral right hip arthroplasty. No  gross abnormality of the right lower extremity.     Beam streak hardware artifact secondary to left hip hemiarthroplasty  partially compromising assessment.     Bones:      Mildly displaced inferior pubic ramus comminuted fracture. Apparent  contour irregularity of the superior pubic ramus/acetabular root,  likely representing area of nondisplaced fracture though beam streak  artifact limits evaluation.     Hip hemiarthroplasty without evidence of periprostatic fracture.  Chronic contour alteration of the proximal femur with prominent bony  proliferation anteriorly at the intertrochanteric/subtrochanteric  area. Mild-to-moderate heterotopic ossification lateral hip.      Enthesopathic change of innominate bones. Degenerative changes of the  knee with chondrocalcinosis. Partially visualized sclerosis of the  proximal tibia, likely enchondroma.     Soft tissues:      Vascular calcifications. Colonic diverticulosis without evidence  diverticulitis. No substantial left hip joint effusion suspected.  Subcutaneous soft tissue stranding especially over the lateral hip,  may be related to contusion. Moderate fatty infiltration of the  gluteus medius laterally. No substantial knee joint effusion.                                                                      IMPRESSION:   1. Mildly displaced inferior pubic ramus comminuted fracture.  2. Suspect non-displaced pubic root fracture though beam streak  artifact limits evaluation.      Contacted Harrison at 9:50 AM on 5/2/2024 to discuss the above results.  It does appear as though on CT, which varied from the x-ray, the  patient does have a mild displaced inferior pubic rami comminuted fracture and another area of contour iregularity seen in the superior pubic ramus acetabular root, suggestive of a possible prosthetic fracture there as well.  The prosthetic overall did look appropriate.  However given the patient having a comminuted fracture of the acetabulum/pelvis, in conjunction with chronic left prosthetic pain after LIZABETH, we have discussed sending the patient to one of our hip orthopedic surgeons for evaluation to determine if this truly warrants a surgical discussion.  I have discussed this in great detail with the patient.  Given the fact the patient is having substantial pain and difficulty with walking, he is in agreement with this.  Very likely, as I discussed with the patient, the pelvic fractures may need time to heal with nonweightbearing before any discussion is broached in regards to repeat arthroplasty, but discussing this with one of our surgeons is reasonable.  If none operative management is suggested, I am more than happy to see the patient back to help him through the inferior pubic rami comminuted fracture, and the possible pubic root fracture of the acetabulum.  In the meantime have suggested nonweightbearing until being seen by one of our orthopedic surgeons.  All questions have been answered.  ER and urgent care precautions have been discussed.    PMH, Medications and Allergies were reviewed and updated as needed.    ROS:  As noted above otherwise negative.    Patient Active Problem List   Diagnosis    Essential hypertension, benign    Pain in joint, upper arm    Allergic rhinitis    Pain in joint, lower leg    Chronic airway obstruction (H)    Monoclonal paraproteinemia    Immunodeficiency (H24)    Eczema    Transient cerebral ischemia    Bunion    Occipital neuralgia    Hyperlipidemia LDL goal <100    Health Care Home    Low back pain    Olecranon bursitis of right elbow    Bruit    Retina hole    signed &  scanned on 09/23/2011  (1-4-2013 printed but not scanned in)     Chronic, continuous use of opioids    Atherosclerosis of native coronary artery of native heart without angina pectoris    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, unspecified thyrotoxicosis type    Right-sided low back pain with right-sided sciatica    Coronary artery disease involving native coronary artery of native heart without angina pectoris    BRENNEN (obstructive sleep apnea)    Hammer toe of right foot    Hallux limitus of right foot    Corn of toe    Non-recurrent unilateral inguinal hernia with obstruction without gangrene    Left inguinal hernia    Metastasis from thyroid cancer (H)    Pulmonary nodules    Preoperative examination    Chronic pain disorder    Dehydration    Shock (H)    CALLUM (acute kidney injury) (H24)    COPD, very severe (H)    Hypokalemia    Hypoxia    Spleen hematoma, initial encounter    Fall, initial encounter    SAH (subarachnoid hemorrhage) (H)    Malignant neoplasm metastatic to lung, unspecified laterality (H)    Hospital-acquired pneumonia    Radiation pneumonitis (H24)    Physical deconditioning    Closed nondisplaced fracture of pelvis, unspecified part of pelvis, initial encounter (H)       Current Outpatient Medications   Medication Sig Dispense Refill    acetaminophen (TYLENOL) 500 MG tablet Take 1,000 mg by mouth 3 times daily      acetylcysteine (MUCOMYST) 20 % neb solution Take 2 mLs by nebulization 4 times daily      albuterol (ACCUNEB) 1.25 MG/3ML neb solution Take 1 vial (1.25 mg) by nebulization every 4 hours as needed for shortness of breath or wheezing 90 mL 4    albuterol (PROAIR HFA/PROVENTIL HFA/VENTOLIN HFA) 108 (90 Base) MCG/ACT inhaler INHALE 2 PUFFS BY MOUTH EVERY 6 HOURS AS NEEDED FOR WHEEZE OR FOR SHORTNESS OF BREATH 18 g 3    alendronate (FOSAMAX) 70 MG tablet Take 1 tablet (70 mg) by mouth every 7 days 12 tablet 3    amLODIPine (NORVASC) 5 MG tablet Take 1 tablet (5 mg) by mouth at bedtime 90  tablet 3    ascorbic acid 1000 MG TABS tablet Take 2,000 mg by mouth daily      aspirin 81 MG EC tablet Take 81 mg by mouth 2 times daily      calcium carbonate (OS-KARLIE) 1500 (600 Ca) MG tablet Take 600 mg by mouth daily      carboxymethylcellulose PF (REFRESH PLUS) 0.5 % ophthalmic solution Place 1 drop into both eyes every hour as needed for dry eyes      cholecalciferol 25 MCG (1000 UT) TABS Take 1,000 Units by mouth daily       Fluticasone-Umeclidin-Vilanterol (TRELEGY ELLIPTA) 200-62.5-25 MCG/ACT oral inhaler Inhale 1 puff into the lungs daily In AM. Rinse mouth after use. 60 each 11    furosemide (LASIX) 20 MG tablet Take 1-2 tablets (20-40 mg) by mouth every other day Give 40 mg by mouth  every other day for COPD AND Give 20 mg by mouth very other day HTN, 90 tablet 3    gabapentin (NEURONTIN) 400 MG capsule Take 1 capsule (400 mg) by mouth 3 times daily (Patient taking differently: Take 600 mg by mouth 3 times daily) 270 capsule 3    hydrocortisone 2.5 % cream Apply topically 2 times daily as needed for itching      HYDROmorphone (DILAUDID) 2 MG tablet Take 1 tablet (2 mg) by mouth every 6 hours as needed for pain 15 tablet 0    KLOR-CON 20 MEQ CR tablet TAKE 1 TABLET BY MOUTH 2 TIMES DAILY 180 tablet 3    levothyroxine (SYNTHROID/LEVOTHROID) 125 MCG tablet Take 1 tablet (125 mcg) by mouth daily 90 tablet 3    MegaRed Omega-3 Krill Oil 500 MG CAPS Take 500 mg by mouth daily      Melatonin 10 MG TABS tablet Take 10 mg by mouth nightly as needed      methocarbamol (ROBAXIN) 500 MG tablet Take 1 tablet (500 mg) by mouth 4 times daily 120 tablet 0    metoprolol succinate ER (TOPROL XL) 50 MG 24 hr tablet Take 1 tablet (50 mg) by mouth every evening      multivitamin w/minerals (THERA-VIT-M) tablet Take 1 tablet by mouth daily       nitroGLYcerin (NITROSTAT) 0.4 MG sublingual tablet FOR CHEST PAIN PLACE 1 TAB UNDER TONGUE EVERY 5 MIN FOR 3 DOSES. IF SYMPTOMS PERSIST 5 MIN AFTER 1ST DOSE CALL 911 25 tablet 3     predniSONE (DELTASONE) 1 MG tablet Take 2 tablets (2 mg) by mouth daily For COPD 60 tablet 4    rosuvastatin (CRESTOR) 10 MG tablet TAKE 1 TABLET BY MOUTH EVERY DAY (Patient taking differently: Take 10 mg by mouth every evening) 90 tablet 3    senna-docusate (SENOKOT-S/PERICOLACE) 8.6-50 MG tablet Take 1 tablet by mouth 2 times daily Hold for loose stools      traZODone (DESYREL) 50 MG tablet Take 1 tablet (50 mg) by mouth at bedtime 30 tablet 0            OBJECTIVE:       Vitals: There were no vitals filed for this visit.  BMI: There is no height or weight on file to calculate BMI.    Gen:  Well nourished and in no acute distress  HEENT: Extraocular movement intact  Neck: Supple  Pulm:  Breathing Comfortably. No increased respiratory effort.  Psych: Euthymic. Appropriately answers questions    MSK: Nonantalgic gait.  No discernible tenderness to palpation about the leg or hip.  Full range of motion, with the exception of internal rotation causing mild inguinal pain.  Strength testing is appropriate.      XRAY : Independent evaluation of the pelvis x-ray does not show evidence of the known fracture that was seen on the inferior pubic rami on CT.          ASSESSMENT and PLAN:     Harrison was seen today for pain.    Diagnoses and all orders for this visit:    Closed fracture of left inferior pubic ramus, initial encounter (H)      78-year-old male following up today at the 4-week katerin for the inferior pubic rami fracture.  Patient is overall doing well.  Has been using an assistive device for walking, four-wheel walker.  When he drives, he will use his cane.  He is not having any pain.  His range of motion is improved.  Patient has chronic pain into the hip joint which seems secondary to the total hip arthroplasty that he had multiple years ago.  I had a long discussion with the patient in terms of healing the inferior pubic rami fracture which will take roughly 10 to 12 weeks.  He is 4 weeks and.  Therefore we will see  him in 4 weeks with new x-rays.  X-rays today were discussed with him and did not show any overt evidence of the fracture that was seen on CT.  As the patient continues to do better, we can refer him to one of our hip surgeons given the chronic pain in the inguinal crease, that he has had since his surgery into the hip.  All questions have been answered.  Return precautions advised    Options for treatment and/or follow-up care were reviewed with the patient was actively involved in the decision making process. Patient verbalized understanding and was in agreement with the plan.    Emory Garcia DO  , Sports Medicine  Department of Family Medicine and [facility]

## 2024-05-23 ENCOUNTER — ANCILLARY PROCEDURE (OUTPATIENT)
Dept: GENERAL RADIOLOGY | Facility: CLINIC | Age: 79
End: 2024-05-23
Attending: STUDENT IN AN ORGANIZED HEALTH CARE EDUCATION/TRAINING PROGRAM
Payer: MEDICARE

## 2024-05-23 ENCOUNTER — OFFICE VISIT (OUTPATIENT)
Dept: ORTHOPEDICS | Facility: CLINIC | Age: 79
End: 2024-05-23
Payer: MEDICARE

## 2024-05-23 DIAGNOSIS — S32.592A CLOSED FRACTURE OF LEFT INFERIOR PUBIC RAMUS, INITIAL ENCOUNTER (H): ICD-10-CM

## 2024-05-23 DIAGNOSIS — S32.592A CLOSED FRACTURE OF LEFT INFERIOR PUBIC RAMUS, INITIAL ENCOUNTER (H): Primary | ICD-10-CM

## 2024-05-23 PROCEDURE — 72170 X-RAY EXAM OF PELVIS: CPT | Performed by: RADIOLOGY

## 2024-05-23 PROCEDURE — 99213 OFFICE O/P EST LOW 20 MIN: CPT | Performed by: STUDENT IN AN ORGANIZED HEALTH CARE EDUCATION/TRAINING PROGRAM

## 2024-05-23 NOTE — LETTER
5/23/2024      RE: Harrison Thomas  [id number] [facility] [address]     Dear Colleague,    Thank you for referring your patient, Harrison Thomas, to the [facility] [place]. Please see a copy of my visit note below.    [facility]  Sports Medicine Clinic  Clinics and Surgery Center           SUBJECTIVE       Harrison Thomas is a 78 year old male presenting to clinic today for follow-up of the left hip.  Overall patient is walking now and doing better.  No instability.  Has had chronic pain on the left hip since having the surgery done, LIZABETH.    4/25/24: Injury of left hip, subsequent encounter  -     Orthopedic  Referral  -     CT Femur Thigh Left w/o Contrast; Future  -     Orthopedic  Referral; Future        78-year-old male presenting to clinic today with left hip/anterior thigh pain after a fall.  Patient did have a plethora of images from April 17 which were all relatively negative.  A CT of his thigh was also ordered at that time, but the patient did not get it.  Given the lack of field-of-view from the x-rays going into the area of tenderness on the thigh, I do think a stat CT of the femur is reasonable.  This has been ordered.  Given the fact the patient has had difficulty with his hip replacement/partial since the time he had it, and is reproducible inguinal crease/joint line pain with examination, I have placed a referral to one of our orthopedic hip surgeons for overall discussion.  Patient was made aware that he needs to have a second surgery on his hip, because of that this order has also been placed.  All the patient's questions have been answered.  We will reach out to the patient with any urgent findings that are found on the stat CT.  Patient lives in [place], so him getting back to our site may be a bit difficult.  ER and urgent care precautions have been discussed in great detail.  Return precautions also advised.   In the meantime, I do want the patient using some form of an assistive device with any ambulation.  At home he has a walker, which she did not bring today.  I have counseled him on using this at all times until the CT results have been followed up with him.    5/2/24:  Narrative & Impression  CT FEMUR THIGH LEFT W/O CONTRAST  4/25/2024 11:14 AM       HISTORY: Injury of left hip, subsequent encounter     TECHNIQUE: Helical acquisition of the left femur without intravenous  contrast.     COMPARISON: 4/17/2024 radiograph     FINDINGS:       images demonstrate contralateral right hip arthroplasty. No  gross abnormality of the right lower extremity.     Beam streak hardware artifact secondary to left hip hemiarthroplasty  partially compromising assessment.     Bones:      Mildly displaced inferior pubic ramus comminuted fracture. Apparent  contour irregularity of the superior pubic ramus/acetabular root,  likely representing area of nondisplaced fracture though beam streak  artifact limits evaluation.     Hip hemiarthroplasty without evidence of periprostatic fracture.  Chronic contour alteration of the proximal femur with prominent bony  proliferation anteriorly at the intertrochanteric/subtrochanteric  area. Mild-to-moderate heterotopic ossification lateral hip.      Enthesopathic change of innominate bones. Degenerative changes of the  knee with chondrocalcinosis. Partially visualized sclerosis of the  proximal tibia, likely enchondroma.     Soft tissues:      Vascular calcifications. Colonic diverticulosis without evidence  diverticulitis. No substantial left hip joint effusion suspected.  Subcutaneous soft tissue stranding especially over the lateral hip,  may be related to contusion. Moderate fatty infiltration of the  gluteus medius laterally. No substantial knee joint effusion.                                                                      IMPRESSION:   1. Mildly displaced inferior pubic ramus  comminuted fracture.  2. Suspect non-displaced pubic root fracture though beam streak  artifact limits evaluation.      Contacted Harrison at 9:50 AM on 5/2/2024 to discuss the above results.  It does appear as though on CT, which varied from the x-ray, the patient does have a mild displaced inferior pubic rami comminuted fracture and another area of contour iregularity seen in the superior pubic ramus acetabular root, suggestive of a possible prosthetic fracture there as well.  The prosthetic overall did look appropriate.  However given the patient having a comminuted fracture of the acetabulum/pelvis, in conjunction with chronic left prosthetic pain after LIZABETH, we have discussed sending the patient to one of our hip orthopedic surgeons for evaluation to determine if this truly warrants a surgical discussion.  I have discussed this in great detail with the patient.  Given the fact the patient is having substantial pain and difficulty with walking, he is in agreement with this.  Very likely, as I discussed with the patient, the pelvic fractures may need time to heal with nonweightbearing before any discussion is broached in regards to repeat arthroplasty, but discussing this with one of our surgeons is reasonable.  If none operative management is suggested, I am more than happy to see the patient back to help him through the inferior pubic rami comminuted fracture, and the possible pubic root fracture of the acetabulum.  In the meantime have suggested nonweightbearing until being seen by one of our orthopedic surgeons.  All questions have been answered.  ER and urgent care precautions have been discussed.    PMH, Medications and Allergies were reviewed and updated as needed.    ROS:  As noted above otherwise negative.    Patient Active Problem List   Diagnosis     Essential hypertension, benign     Pain in joint, upper arm     Allergic rhinitis     Pain in joint, lower leg     Chronic airway obstruction (H)      Monoclonal paraproteinemia     Immunodeficiency (H24)     Eczema     Transient cerebral ischemia     Bunion     Occipital neuralgia     Hyperlipidemia LDL goal <100     Health Care Home     Low back pain     Olecranon bursitis of right elbow     Bruit     Retina hole     signed & scanned on 09/23/2011  (1-4-2013 printed but not scanned in)      Chronic, continuous use of opioids     Atherosclerosis of native coronary artery of native heart without angina pectoris     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, unspecified thyrotoxicosis type     Right-sided low back pain with right-sided sciatica     Coronary artery disease involving native coronary artery of native heart without angina pectoris     BRENNEN (obstructive sleep apnea)     Hammer toe of right foot     Hallux limitus of right foot     Corn of toe     Non-recurrent unilateral inguinal hernia with obstruction without gangrene     Left inguinal hernia     Metastasis from thyroid cancer (H)     Pulmonary nodules     Preoperative examination     Chronic pain disorder     Dehydration     Shock (H)     CALLUM (acute kidney injury) (H24)     COPD, very severe (H)     Hypokalemia     Hypoxia     Spleen hematoma, initial encounter     Fall, initial encounter     SAH (subarachnoid hemorrhage) (H)     Malignant neoplasm metastatic to lung, unspecified laterality (H)     Hospital-acquired pneumonia     Radiation pneumonitis (H24)     Physical deconditioning     Closed nondisplaced fracture of pelvis, unspecified part of pelvis, initial encounter (H)       Current Outpatient Medications   Medication Sig Dispense Refill     acetaminophen (TYLENOL) 500 MG tablet Take 1,000 mg by mouth 3 times daily       acetylcysteine (MUCOMYST) 20 % neb solution Take 2 mLs by nebulization 4 times daily       albuterol (ACCUNEB) 1.25 MG/3ML neb solution Take 1 vial (1.25 mg) by nebulization every 4 hours as needed for shortness of breath or wheezing 90 mL 4     albuterol (PROAIR HFA/PROVENTIL  HFA/VENTOLIN HFA) 108 (90 Base) MCG/ACT inhaler INHALE 2 PUFFS BY MOUTH EVERY 6 HOURS AS NEEDED FOR WHEEZE OR FOR SHORTNESS OF BREATH 18 g 3     alendronate (FOSAMAX) 70 MG tablet Take 1 tablet (70 mg) by mouth every 7 days 12 tablet 3     amLODIPine (NORVASC) 5 MG tablet Take 1 tablet (5 mg) by mouth at bedtime 90 tablet 3     ascorbic acid 1000 MG TABS tablet Take 2,000 mg by mouth daily       aspirin 81 MG EC tablet Take 81 mg by mouth 2 times daily       calcium carbonate (OS-KARLIE) 1500 (600 Ca) MG tablet Take 600 mg by mouth daily       carboxymethylcellulose PF (REFRESH PLUS) 0.5 % ophthalmic solution Place 1 drop into both eyes every hour as needed for dry eyes       cholecalciferol 25 MCG (1000 UT) TABS Take 1,000 Units by mouth daily        Fluticasone-Umeclidin-Vilanterol (TRELEGY ELLIPTA) 200-62.5-25 MCG/ACT oral inhaler Inhale 1 puff into the lungs daily In AM. Rinse mouth after use. 60 each 11     furosemide (LASIX) 20 MG tablet Take 1-2 tablets (20-40 mg) by mouth every other day Give 40 mg by mouth  every other day for COPD AND Give 20 mg by mouth very other day HTN, 90 tablet 3     gabapentin (NEURONTIN) 400 MG capsule Take 1 capsule (400 mg) by mouth 3 times daily (Patient taking differently: Take 600 mg by mouth 3 times daily) 270 capsule 3     hydrocortisone 2.5 % cream Apply topically 2 times daily as needed for itching       HYDROmorphone (DILAUDID) 2 MG tablet Take 1 tablet (2 mg) by mouth every 6 hours as needed for pain 15 tablet 0     KLOR-CON 20 MEQ CR tablet TAKE 1 TABLET BY MOUTH 2 TIMES DAILY 180 tablet 3     levothyroxine (SYNTHROID/LEVOTHROID) 125 MCG tablet Take 1 tablet (125 mcg) by mouth daily 90 tablet 3     MegaRed Omega-3 Krill Oil 500 MG CAPS Take 500 mg by mouth daily       Melatonin 10 MG TABS tablet Take 10 mg by mouth nightly as needed       methocarbamol (ROBAXIN) 500 MG tablet Take 1 tablet (500 mg) by mouth 4 times daily 120 tablet 0     metoprolol succinate ER (TOPROL XL)  50 MG 24 hr tablet Take 1 tablet (50 mg) by mouth every evening       multivitamin w/minerals (THERA-VIT-M) tablet Take 1 tablet by mouth daily        nitroGLYcerin (NITROSTAT) 0.4 MG sublingual tablet FOR CHEST PAIN PLACE 1 TAB UNDER TONGUE EVERY 5 MIN FOR 3 DOSES. IF SYMPTOMS PERSIST 5 MIN AFTER 1ST DOSE CALL 911 25 tablet 3     predniSONE (DELTASONE) 1 MG tablet Take 2 tablets (2 mg) by mouth daily For COPD 60 tablet 4     rosuvastatin (CRESTOR) 10 MG tablet TAKE 1 TABLET BY MOUTH EVERY DAY (Patient taking differently: Take 10 mg by mouth every evening) 90 tablet 3     senna-docusate (SENOKOT-S/PERICOLACE) 8.6-50 MG tablet Take 1 tablet by mouth 2 times daily Hold for loose stools       traZODone (DESYREL) 50 MG tablet Take 1 tablet (50 mg) by mouth at bedtime 30 tablet 0            OBJECTIVE:       Vitals: There were no vitals filed for this visit.  BMI: There is no height or weight on file to calculate BMI.    Gen:  Well nourished and in no acute distress  HEENT: Extraocular movement intact  Neck: Supple  Pulm:  Breathing Comfortably. No increased respiratory effort.  Psych: Euthymic. Appropriately answers questions    MSK: Nonantalgic gait.  No discernible tenderness to palpation about the leg or hip.  Full range of motion, with the exception of internal rotation causing mild inguinal pain.  Strength testing is appropriate.      XRAY : Independent evaluation of the pelvis x-ray does not show evidence of the known fracture that was seen on the inferior pubic rami on CT.          ASSESSMENT and PLAN:     Harrison was seen today for pain.    Diagnoses and all orders for this visit:    Closed fracture of left inferior pubic ramus, initial encounter (H)      78-year-old male following up today at the 4-week katerin for the inferior pubic rami fracture.  Patient is overall doing well.  Has been using an assistive device for walking, four-wheel walker.  When he drives, he will use his cane.  He is not having any pain.  His  range of motion is improved.  Patient has chronic pain into the hip joint which seems secondary to the total hip arthroplasty that he had multiple years ago.  I had a long discussion with the patient in terms of healing the inferior pubic rami fracture which will take roughly 10 to 12 weeks.  He is 4 weeks and.  Therefore we will see him in 4 weeks with new x-rays.  X-rays today were discussed with him and did not show any overt evidence of the fracture that was seen on CT.  As the patient continues to do better, we can refer him to one of our hip surgeons given the chronic pain in the inguinal crease, that he has had since his surgery into the hip.  All questions have been answered.  Return precautions advised    Options for treatment and/or follow-up care were reviewed with the patient was actively involved in the decision making process. Patient verbalized understanding and was in agreement with the plan.    Emory Garcia DO  , Sports Medicine  Department of [facility]      Again, thank you for allowing me to participate in the care of your patient.      Sincerely,    Emory Garcia DO

## 2024-05-24 ENCOUNTER — VIRTUAL VISIT (OUTPATIENT)
Dept: FAMILY MEDICINE | Facility: CLINIC | Age: 79
End: 2024-05-24
Payer: MEDICARE

## 2024-05-24 DIAGNOSIS — J44.9 COPD, VERY SEVERE (H): ICD-10-CM

## 2024-05-24 DIAGNOSIS — S32.592D CLOSED FRACTURE OF LEFT INFERIOR PUBIC RAMUS WITH ROUTINE HEALING, SUBSEQUENT ENCOUNTER: Primary | ICD-10-CM

## 2024-05-24 DIAGNOSIS — F11.90 CHRONIC, CONTINUOUS USE OF OPIOIDS: ICD-10-CM

## 2024-05-24 DIAGNOSIS — I10 ESSENTIAL HYPERTENSION, BENIGN: ICD-10-CM

## 2024-05-24 PROCEDURE — 99441 PR PHYSICIAN TELEPHONE EVALUATION 5-10 MIN: CPT | Mod: 93 | Performed by: INTERNAL MEDICINE

## 2024-05-24 RX ORDER — OXYCODONE HYDROCHLORIDE 5 MG/1
5 TABLET ORAL EVERY 6 HOURS PRN
Qty: 120 TABLET | Refills: 0 | Status: SHIPPED | OUTPATIENT
Start: 2024-05-24 | End: 2024-06-24

## 2024-05-24 ASSESSMENT — ANXIETY QUESTIONNAIRES
3. WORRYING TOO MUCH ABOUT DIFFERENT THINGS: NOT AT ALL
IF YOU CHECKED OFF ANY PROBLEMS ON THIS QUESTIONNAIRE, HOW DIFFICULT HAVE THESE PROBLEMS MADE IT FOR YOU TO DO YOUR WORK, TAKE CARE OF THINGS AT HOME, OR GET ALONG WITH OTHER PEOPLE: SOMEWHAT DIFFICULT
7. FEELING AFRAID AS IF SOMETHING AWFUL MIGHT HAPPEN: NOT AT ALL
1. FEELING NERVOUS, ANXIOUS, OR ON EDGE: SEVERAL DAYS
5. BEING SO RESTLESS THAT IT IS HARD TO SIT STILL: NOT AT ALL
2. NOT BEING ABLE TO STOP OR CONTROL WORRYING: NOT AT ALL
GAD7 TOTAL SCORE: 2
6. BECOMING EASILY ANNOYED OR IRRITABLE: NOT AT ALL
GAD7 TOTAL SCORE: 2

## 2024-05-24 ASSESSMENT — PATIENT HEALTH QUESTIONNAIRE - PHQ9: 5. POOR APPETITE OR OVEREATING: SEVERAL DAYS

## 2024-05-24 NOTE — PROGRESS NOTES
Harrison is a 78 year old who is being evaluated via a billable telephone visit.    What phone number would you like to be contacted at? [phone]  How would you like to obtain your AVS? Kristin  Originating Location (pt. Location): Home    Distant Location (provider location):  On-site    Assessment & Plan   This is a very complex patient with history of cancer, severe COPD and multiple fractures  I discussed with him that Fosamax may not be working and we might need Prolia injections and we will look into prior authorization today  I also discussed with him about pain control and side effects of Dilaudid and respiratory depression    I definitely think we need pain control and we will offer oxycodone and medical marijuana can be looked into in the next visit    He will continue to monitor his blood pressure on amlodipine at home    He will continue his inhalers    I discussed the x-ray and CT findings with him    I discussed about depression and pain medications with him    ICD-10-CM    1. Closed fracture of left inferior pubic ramus with routine healing, subsequent encounter  S32.592D oxyCODONE (ROXICODONE) 5 MG tablet      2. COPD, very severe (H)  J44.9       3. Essential hypertension, benign  I10       4. Chronic, continuous use of opioids  F11.90                 MED REC REQUIRED  Post Medication Reconciliation Status: discharge medications reconciled and changed, per note/orders        Subjective   Harrison is a 78 year old, presenting for the following health issues: This is a very complex patient who is home and out to his daughter's home after TCU  On April 26 he was in the hospital and then admitted to TCU where he was discharged this week  He is now in and over with his daughter  Patient had fallen and was found to have left inferior pubic ramus fracture  Previously he has bilateral hip fractures also with the falls and is on Fosamax  Patient has severe COPD  He is in severe pain and was given Dilaudid for  this pain control  His anxiety and depression are stable and his breathing is comfortable    COPD (Follow up) and Hypertension  78-year-old male presenting to clinic today with left hip/anterior thigh pain after a fall. Patient did have a plethora of images from April 17 which were all relatively negative. A CT of his thigh was also ordered at that time, but the patient did not get it. Given the lack of field-of-view from the x-rays going into the area of tenderness on the thigh                                              Impression: Healing left inferior pubic ramus fracture indicated by  mild periostitis. Superior pubic ramus fracture not well seen  radiographically      Study Result    Narrative & Impression   EXAM: CT PELVIS BONE WO CONTRAST  LOCATION: [facility]  DATE: 4/26/2024     INDICATION: Fall, left hip pain.  COMPARISON: None.  TECHNIQUE: CT scan of the pelvis was performed without IV contrast. Multiplanar reformats were obtained. Dose reduction techniques were used.  CONTRAST: None.     FINDINGS: Postoperative changes of bilateral total hip arthroplasty. The components appear well seated bilaterally. No evidence for fracture. No evidence for reactive osteolysis. Chronic irregularity of the anterior aspect of the intertrochanteric region   of the left hip. This is not an acute finding.     There is a fracture of the left inferior pubic ramus. Even in retrospect, this is not identified on recent x-ray evaluation 04/17/2024. Additional nondisplaced fracture of the junction of the anterior column of the left acetabulum and left superior pubic   ramus. No additional fractures are identified.     The intrapelvic contents are negative for abnormal mass, free fluid, or lymphadenopathy. There is a benign intramuscular lipoma within the right lateral oblique musculature incompletely visualized on the field-of-view within the abdomen.                                                                       IMPRESSION:  1.  Nondisplaced fractures of the left pubic rami.  2.  Postoperative changes bilateral total hip arthroplasty. Components appear well seated bilaterally.  3.  Chronic irregularity along the anterior aspect of the left intertrochanteric region. This could be related to old injury, but this is not an acute finding.  4.  Incompletely visualized benign-appearing intramuscular lipoma within the right lateral oblique musculature of the abdomen.  5.  Intrapelvic contents negative.     History of Present Illness       COPD:  He presents for follow up of COPD.  Overall, COPD symptoms are stable since last visit.  He has same as usual fatigue or shortness of breath with exertion and same as usual shortness of breath at rest.  He sometimes coughs and does not have change in sputum. No recent fever. He can walk the length of 3-5 rooms without stopping to rest. He can walk 1 flights of stairs without resting. The patient has had an ED, urgent care, or hospital admission because of COPD since the last visit. He states he has had 2 visit(s) to an ED, Urgent Care, or Hospital due to his COPD.    He eats 0-1 servings of fruits and vegetables daily.He consumes 0 sweetened beverage(s) daily.He exercises with enough effort to increase his heart rate 9 or less minutes per day.  He exercises with enough effort to increase his heart rate 3 or less days per week.   He is taking medications regularly.               Review of Systems  Constitutional, HEENT, cardiovascular, pulmonary, GI, , musculoskeletal, neuro, skin, endocrine and psych systems are negative, except as otherwise noted.      Objective    Vitals - Patient Reported  Systolic (Patient Reported): 130  Diastolic (Patient Reported): 80      Vitals:  No vitals were obtained today due to virtual visit.    Physical Exam   General: Alert and no distress //Respiratory: No audible wheeze, cough, or shortness of breath // Psychiatric:  Appropriate affect,  tone, and pace of words  I have reviewed patient's records from emergency room, TCU, hospital, x-rays, imaging  I have spent 30 minutes in doing that and 10 minutes with the patient over phone call and additional time in making this note  I have also communicated with the care coordination    Longitudinal plan of care was discussed with this patient about complex care of her condition during the visit today.I will continue to follow up and support her in the care of this complex condition.I will also follow up as needed by phone or my chart.;  This note was completed in part using dictation via the Dragon voice recognition software. Some word and grammatical errors may occur and must be interpreted in the appropriate clinical context.  If there are any questions pertaining to this issue, please contact me for further clarification             Phone call duration: 10 minutes  Signed Electronically by: Yaneli Dozier MD

## 2024-05-28 ENCOUNTER — PATIENT OUTREACH (OUTPATIENT)
Dept: CARE COORDINATION | Facility: CLINIC | Age: 79
End: 2024-05-28
Payer: MEDICARE

## 2024-05-28 NOTE — PROGRESS NOTES
Clinic Care Coordination Contact  Clinic Care Coordination Contact  OUTREACH    Referral Information:  Referral Source: IP Handoff    Primary Diagnosis: Orthopedic    Chief Complaint   Patient presents with    Clinic Care Coordination - Post Hospital     Linic Care Coordination RN         Universal Utilization: Clinical Data: Patient was hospitalized at [facility] from 4/26/2024 to 4/28/2024 with diagnosis of Mechanical fall, Non-displaced fracture of the left pubic rami, COPD, CAD, HTN.      Assessment: Patient has transitioned to TCU/ARU for short term rehabilitation:     Facility Name: [facility]   Clinic Utilization  Difficulty keeping appointments:: No  Compliance Concerns: No  No-Show Concerns: No  No PCP office visit in Past Year: No  Utilization      No Show Count (past year)  1             ED Visits  3             Hospital Admissions  3                    Current as of: 5/27/2024 10:29 AM                Clinical Concerns:  Current Medical Concerns:  Patient just ]had a PCP and Orthopedic visit .   Patient has not heard from the home care servics.  CC RN spoke to [facility] intake and they refused due to he is receiving [facility] Care services  CC RN called [facility] and they don't have this patient under their care  CC RN spoke [facility]  and he will send a message to the [facility] to review order.  CC RN informed patient of this plan and he should receive a call to set up home care    Patient is getting around ell with the walker and pain is managed by prescribed pain medications   Current Behavioral Concerns: No    Education Provided to patient: CC role introduced    Pain  Pain (GOAL):: No  Health Maintenance Reviewed: Not assessed  Clinical Pathway: None    Medication Management:  Medication review status:   Reviewed at PCP visit 5/24/2024 and no further questions      Functional Status:  Dependent ADLs:: Ambulation-walker  Dependent IADLs::  Cooking, Laundry, Cleaning, Shopping, Meal Preparation  Bed or wheelchair confined:: No  Mobility Status: Independent w/Device    Living Situation:  Current living arrangement:: Other ([facility])  Type of residence:: Other ([facility])    Lifestyle & Psychosocial Needs:    Social Determinants of Health     Food Insecurity: No Food Insecurity (4/17/2024)    [facility]    Food Insecurity     Worried About Running Out of Food in the Last Year: 1   Depression: Not at risk (5/24/2024)    PHQ-2     PHQ-2 Score: 0   Recent Concern: Depression - At risk (3/19/2024)    PHQ-2     PHQ-2 Score: 3   Housing Stability: Low Risk  (4/17/2024)    Received from [facility]    Housing Stability     Unable to Pay for Housing in the Last Year: 1   Tobacco Use: Medium Risk (5/24/2024)    Patient History     Smoking Tobacco Use: Former     Smokeless Tobacco Use: Never     Passive Exposure: Not on file   Financial Resource Strain: Low Risk  (4/17/2024)    Received from [facility]    Financial Resource Strain     Difficulty of Paying Living Expenses: 3     Difficulty of Paying Living Expenses: Not on file   Alcohol Use: Not on file   Transportation Needs: No Transportation Needs (4/17/2024)    Received from [facility]    Transportation Needs     Lack of Transportation (Medical): 1   Physical Activity: Not on file   Interpersonal Safety: Low Risk  (5/24/2024)    Interpersonal Safety     Do you feel physically and emotionally safe where you currently live?: Yes     Within the past 12 months, have you been hit, slapped, kicked or otherwise physically hurt by someone?: No     Within the past 12 months, have you been humiliated or emotionally abused in other ways by your partner or ex-partner?: No   Stress: Not on file   Social Connections: Socially Integrated (4/17/2024)    [facility]    Social Connections     Frequency of Communication with Friends and Family: 0   Health Literacy: Not on file     Inadequate nutrition (GOAL):: No  Tube Feeding: No  Inadequate activity/exercise (GOAL):: No  Significant changes in sleep pattern (GOAL): No        Scientology or spiritual beliefs that impact treatment:: No  Mental health DX:: No  Mental health management concern (GOAL):: No  Chemical Dependency Status: No Current Concerns  Informal Support system:: Spouse, Children             Resources and Interventions:  Current Resources:   Skilled Home Care Services: Skilled Nursing, Home Health Aid, Physical Therapy, Occupational Therapy  Community Resources: Home Care  Supplies Currently Used at Home: Oxygen Tubing/Supplies, Nebulizer tubing  Equipment Currently Used at Home: walker, rolling, shower chair, grab bar, tub/shower  Employment Status: retired         Advance Care Plan/Directive  Advanced Care Plans/Directives on file:: Yes  Status of record:: On File and Validated  Type Advanced Care Plans/Directives: Advanced Directive - On File    Referrals Placed: None    Patient/Caregiver understanding: Patient expresses understanding of discharge instructions     Outreach Frequency: weekly, more frequently as needed  Future Appointments                In 3 weeks Emory Garcia DO [facility]    In 4 months Khoa Handy MD [facility]    In 4 months Yaneli Dozier MD [facility]    In 9 months Demarcus Simmons MD [facility] Specialty [facility]            Plan: CC RN will follow up in 3-5 business days     [facility]   Marcela Dickerson RN, Care Coordinator   [facility]'s   E-mail [email or web address]   [phone]

## 2024-05-28 NOTE — LETTER
[facility] CARE COORDINATION  [address]  [place] [address]     May 29, 2024    Harrison Thomas  [address] [facility] [address]      Dear Harrison,    I am a clinic care coordinator who works with Yaneli Dozier MD with the [facility]. I wanted to thank you for spending the time to talk with me.  Below is a description of clinic care coordination and how I can further assist you.       The clinic care coordination team is made up of a registered nurse, , financial resource worker and community health worker who understand the health care system. The goal of clinic care coordination is to help you manage your health and improve access to the health care system. Our team works alongside your provider to assist you in determining your health and social needs. We can help you obtain health care and community resources, providing you with necessary information and education. We can work with you through any barriers and develop a care plan that helps coordinate and strengthen the communication between you and your care team.  Our services are voluntary and are offered without charge to you personally.    Please feel free to contact me with any questions or concerns regarding care coordination and what we can offer.      We are focused on providing you with the highest-quality healthcare experience possible.    Sincerely,     [facility]   Marcela Dickerson RN, Care Coordinator   [facility]'s   E-mail [email or web address]   [phone]     Enclosed: I have enclosed a copy of the Patient Centered Plan of Care. This has helpful information and goals that we have talked about. Please keep this in an easy to access place to use as needed.

## 2024-05-28 NOTE — LETTER
[facility]  Patient Centered Plan of Care  About Me:        Patient Name:  Harrison Thomas    YOB: 1945  Age:         78 year old   Candice MRN:    [id number] Telephone Information:  Home Phone [phone]   Mobile [phone]       Address:  [address] [facility] [address] Email address:  [email or web address]      Emergency Contact(s)    Name Relationship Lgl Grd Work Phone Home Phone Mobile Phone   1. HERIBERTO HAUNG* Spouse No   [phone]   2. KEILA HUANG* Daughter No   [phone]   3. GERONIMO Sister No              Primary language:  English     needed? No   [employer]:  [phone] op. 1  Other communication barriers:None    Preferred Method of Communication:  Mail  Current living arrangement: Other ([facility])    Mobility Status/ Medical Equipment: Independent w/Device        Health Maintenance  Health Maintenance Reviewed:   Health Maintenance Due   Topic Date Due    COPD ACTION PLAN  07/28/2018    PSA  12/03/2023    COVID-19 Vaccine (8 - 2023-24 season) 12/15/2023          My Access Plan  Medical Emergency 911   Primary Clinic Line [facility] [phone]   24 Hour Appointment Line [phone] or  [phone]) (toll-free)   24 Hour Nurse Line [phone] (toll-free)   Preferred Urgent Care [facility], [phone]     Preferred Hospital [facility]  [phone]     Preferred Pharmacy [facility]/pharmacy #[id number] [place], [place] - [address] Behavioral Health Crisis Line The National Suicide Prevention Lifeline at 1-931.678.4888 or Text/Call 258           My Care Team Members  Patient Care Team         Relationship Specialty Notifications Start End    Yaneli Dozier MD PCP - General   1/31/07     Phone: [phone] Pager: Use Vocera Fax: [phone] [address] [place] [address]    Yaneli Dozier MD Assigned PCP    10/4/12     Phone: [phone] Pager: Use Vocera Fax: [phone] [address] [place] [address]    Jerry Horvath MD MD Urology  3/29/19     Phone: [phone] Fax: [phone]         [address] [facility] [address]    Cheyenne Quinn, RN Specialty Care Coordinator Urology  3/29/19     Phone: [phone]         Yaneli Dozier MD Referring Physician Internal Medicine  3/29/19     Phone: [phone] Pager: Use Vocera Fax: [phone] [address] [place] [address]    Andrew Rachel MD MD Internal Medicine Admissions 6/16/21     Phone: [phone] Fax: [phone]         [address] [facility] [address]    Demarcus Simmons MD Assigned Endocrinology Provider   7/25/21     Phone: [phone] Fax: [phone] [address] [place] [address]    Niles Cedillo MD Assigned Rheumatology Provider   11/21/21     Phone: [phone] Fax: [phone]         [address] [facility] [address]    Abimael Fatima MD Assigned Heart and Vascular Provider   3/6/22     Phone: [phone] Fax: [phone]         [address] [place] [address]    Yaneli Dozier MD Assigned Pain Medication Provider   1/9/23     Phone: [phone] Pager: Use Vocera Fax: [phone] [address] [place] [address]    Jerry Horvath MD Assigned Surgical Provider   2/18/23     Phone: [phone] Fax: [phone]         [address] [facility] [address]    Kee Morales PA-C Assigned Neuroscience Provider   7/22/23     Phone: [phone] Pager: [phone] Fax: [phone] [address] [place] [address]    Olegario Patel MD Assigned Sleep Provider   2/2/24     Phone: [phone] Fax: [phone] [address] [place] [address]    Lia Segal RN Clinic Care Coordinator  Admissions 4/29/24     Marcela Dickerson RN Clinic Care Coordinator Primary Care - CC Admissions  5/7/24     Phone: [phone]         Emory Garcia, DO Assigned Musculoskeletal Provider   5/23/24     Phone: [phone] Fax: [phone] [address] [facility] [address]    Marcela Dickerson, RN Lead Care Coordinator Primary Care - CC Admissions 5/28/24     Phone: [phone]                     My Care Plans  Self Management and Treatment Plan    Care Plan  Care Plan: Physical Activity       Problem: Patient is inactive       Goal: Increase Physical Activity in the next 1-2 months       Start Date: 5/29/2024 Expected End Date: 7/29/2024    This Visit's Progress: 10%    Priority: High    Note:     Barriers: Deconditioned   Strengths: Motivated   Patient expressed understanding of goal: Yes   Action steps to achieve this goal:  1. I will start [facility] services   2. I will use my walker for safety                                 Action Plans on File:                       Advance Care Plans/Directives:   Advanced Care Plan/Directives on file:   Yes    Status of Document(s):   On File and Validated    Advanced Care Plan/Directives Type:   Advanced Directive - On File           My Medical and Care Information  Problem List   Patient Active Problem List   Diagnosis    Essential hypertension, benign    Pain in joint, upper arm    Allergic rhinitis    Pain in joint, lower leg    Chronic airway obstruction (H)    Monoclonal paraproteinemia    Immunodeficiency (H24)    Eczema    Transient cerebral ischemia    Bunion    Occipital neuralgia    Hyperlipidemia LDL goal <100    Health Care Home    Low back pain    Olecranon bursitis of right elbow    Bruit    Retina hole    signed & scanned on 09/23/2011  (1-4-2013 printed but not scanned in)     Chronic, continuous use of opioids    Atherosclerosis of native coronary artery of native heart without angina pectoris    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, unspecified thyrotoxicosis type    Right-sided low back pain with right-sided sciatica     Coronary artery disease involving native coronary artery of native heart without angina pectoris    BRENNEN (obstructive sleep apnea)    Hammer toe of right foot    Hallux limitus of right foot    Corn of toe    Non-recurrent unilateral inguinal hernia with obstruction without gangrene    Left inguinal hernia    Metastasis from thyroid cancer (H)    Pulmonary nodules    Preoperative examination    Chronic pain disorder    Dehydration    Shock (H)    CALLUM (acute kidney injury) (H24)    COPD, very severe (H)    Hypokalemia    Hypoxia    Spleen hematoma, initial encounter    Fall, initial encounter    SAH (subarachnoid hemorrhage) (H)    Malignant neoplasm metastatic to lung, unspecified laterality (H)    Hospital-acquired pneumonia    Radiation pneumonitis (H24)    Physical deconditioning    Closed nondisplaced fracture of pelvis, unspecified part of pelvis, initial encounter (H)      Current Medications and Allergies:    Current Outpatient Medications   Medication Sig Dispense Refill    acetaminophen (TYLENOL) 500 MG tablet Take 1,000 mg by mouth 3 times daily      acetylcysteine (MUCOMYST) 20 % neb solution Take 2 mLs by nebulization 4 times daily      albuterol (ACCUNEB) 1.25 MG/3ML neb solution Take 1 vial (1.25 mg) by nebulization every 4 hours as needed for shortness of breath or wheezing 90 mL 4    albuterol (PROAIR HFA/PROVENTIL HFA/VENTOLIN HFA) 108 (90 Base) MCG/ACT inhaler INHALE 2 PUFFS BY MOUTH EVERY 6 HOURS AS NEEDED FOR WHEEZE OR FOR SHORTNESS OF BREATH 18 g 3    alendronate (FOSAMAX) 70 MG tablet Take 1 tablet (70 mg) by mouth every 7 days 12 tablet 3    amLODIPine (NORVASC) 5 MG tablet Take 1 tablet (5 mg) by mouth at bedtime 90 tablet 3    ascorbic acid 1000 MG TABS tablet Take 2,000 mg by mouth daily      aspirin 81 MG EC tablet Take 81 mg by mouth 2 times daily      calcium carbonate (OS-KARLIE) 1500 (600 Ca) MG tablet Take 600 mg by mouth daily      carboxymethylcellulose PF (REFRESH PLUS) 0.5 %  ophthalmic solution Place 1 drop into both eyes every hour as needed for dry eyes      cholecalciferol 25 MCG (1000 UT) TABS Take 1,000 Units by mouth daily       Fluticasone-Umeclidin-Vilanterol (TRELEGY ELLIPTA) 200-62.5-25 MCG/ACT oral inhaler Inhale 1 puff into the lungs daily In AM. Rinse mouth after use. 60 each 11    furosemide (LASIX) 20 MG tablet Take 1-2 tablets (20-40 mg) by mouth every other day Give 40 mg by mouth  every other day for COPD AND Give 20 mg by mouth very other day HTN, 90 tablet 3    gabapentin (NEURONTIN) 400 MG capsule Take 1 capsule (400 mg) by mouth 3 times daily (Patient taking differently: Take 600 mg by mouth 3 times daily) 270 capsule 3    hydrocortisone 2.5 % cream Apply topically 2 times daily as needed for itching      KLOR-CON 20 MEQ CR tablet TAKE 1 TABLET BY MOUTH 2 TIMES DAILY 180 tablet 3    levothyroxine (SYNTHROID/LEVOTHROID) 125 MCG tablet Take 1 tablet (125 mcg) by mouth daily 90 tablet 3    MegaRed Omega-3 Krill Oil 500 MG CAPS Take 500 mg by mouth daily      Melatonin 10 MG TABS tablet Take 10 mg by mouth nightly as needed      methocarbamol (ROBAXIN) 500 MG tablet Take 1 tablet (500 mg) by mouth 4 times daily 120 tablet 0    metoprolol succinate ER (TOPROL XL) 50 MG 24 hr tablet Take 1 tablet (50 mg) by mouth every evening      multivitamin w/minerals (THERA-VIT-M) tablet Take 1 tablet by mouth daily       nitroGLYcerin (NITROSTAT) 0.4 MG sublingual tablet FOR CHEST PAIN PLACE 1 TAB UNDER TONGUE EVERY 5 MIN FOR 3 DOSES. IF SYMPTOMS PERSIST 5 MIN AFTER 1ST DOSE CALL 911 25 tablet 3    oxyCODONE (ROXICODONE) 5 MG tablet Take 1 tablet (5 mg) by mouth every 6 hours as needed for severe pain 120 tablet 0    predniSONE (DELTASONE) 1 MG tablet Take 2 tablets (2 mg) by mouth daily For COPD 60 tablet 4    rosuvastatin (CRESTOR) 10 MG tablet TAKE 1 TABLET BY MOUTH EVERY DAY (Patient taking differently: Take 10 mg by mouth every evening) 90 tablet 3    senna-docusate  (SENOKOT-S/PERICOLACE) 8.6-50 MG tablet Take 1 tablet by mouth 2 times daily Hold for loose stools      traZODone (DESYREL) 50 MG tablet Take 1 tablet (50 mg) by mouth at bedtime 30 tablet 0     No current facility-administered medications for this visit.      Care Coordination Start Date: 4/29/2024   Frequency of Care Coordination: weekly, more frequently as needed     Form Last Updated: 05/29/2024

## 2024-05-29 NOTE — PROGRESS NOTES
Clinic Care Coordination Contact  [facility]/Voicemail    Clinical Data: Care Coordinator Outreach    Outreach Documentation Number of Outreach Attempt   5/29/2024   2:14 PM 1       Left message on patient's voicemail with call back information and requested return call.  CC RN left a message wondering if he has received a call from [facility]     Plan: . Care Coordinator will try to reach patient again in 3-5 business days.    [facility]   Marcela Dickerson RN, Care Coordinator   [facility]'s   E-mail [email or web address]   [phone]

## 2024-05-29 NOTE — PROGRESS NOTES
CC RN spoke to Taylor horne for [facility] and she plans to call the patient and set up a home visit for Friday     Patient received the call and will have a visit Friday    CC RN will follow up in 3-5 business days     [facility]   Marcela Dickerson RN, Care Coordinator   [facility]'s   E-mail [email or web address]   [phone]

## 2024-05-30 ENCOUNTER — ANCILLARY PROCEDURE (OUTPATIENT)
Dept: GENERAL RADIOLOGY | Facility: CLINIC | Age: 79
End: 2024-05-30
Attending: STUDENT IN AN ORGANIZED HEALTH CARE EDUCATION/TRAINING PROGRAM
Payer: MEDICARE

## 2024-05-30 ENCOUNTER — OFFICE VISIT (OUTPATIENT)
Dept: URGENT CARE | Facility: URGENT CARE | Age: 79
End: 2024-05-30
Payer: MEDICARE

## 2024-05-30 ENCOUNTER — ALLIED HEALTH/NURSE VISIT (OUTPATIENT)
Dept: NURSING | Facility: CLINIC | Age: 79
End: 2024-05-30
Payer: MEDICARE

## 2024-05-30 VITALS
SYSTOLIC BLOOD PRESSURE: 139 MMHG | HEART RATE: 75 BPM | WEIGHT: 156 LBS | OXYGEN SATURATION: 94 % | RESPIRATION RATE: 18 BRPM | BODY MASS INDEX: 22.38 KG/M2 | TEMPERATURE: 98.1 F | DIASTOLIC BLOOD PRESSURE: 75 MMHG

## 2024-05-30 DIAGNOSIS — M25.511 ACUTE PAIN OF RIGHT SHOULDER: Primary | ICD-10-CM

## 2024-05-30 DIAGNOSIS — S49.91XA SHOULDER INJURY, RIGHT, INITIAL ENCOUNTER: Primary | ICD-10-CM

## 2024-05-30 DIAGNOSIS — M25.511 ACUTE PAIN OF RIGHT SHOULDER: ICD-10-CM

## 2024-05-30 PROCEDURE — 99213 OFFICE O/P EST LOW 20 MIN: CPT | Performed by: STUDENT IN AN ORGANIZED HEALTH CARE EDUCATION/TRAINING PROGRAM

## 2024-05-30 PROCEDURE — 73030 X-RAY EXAM OF SHOULDER: CPT | Mod: TC | Performed by: RADIOLOGY

## 2024-05-30 PROCEDURE — 99207 PR NO CHARGE NURSE ONLY: CPT

## 2024-05-30 NOTE — NURSING NOTE
Patient walked into clinic to be seen for a shoulder injury before urgent care was open so this RN was asked to triage.     Patient fell at his home last night as he was getting into a car. The door moved and hit him, knocking him to the ground. Didn't hit head and no LOC. Not taking blood thinners.     He hit the ground on his right shoulder and it has been painful since the fall.  When asked to raise his arm he can bend at the elbow but rates his pain 10/10 if he moves his shoulder at all.  The area with pain is not lacerated, scratched, bruised or swollen. His right elbow is scratched up but is scabbing over already.    Huddled with Andrew Flores PA-C and patient is ok to wait 30 mins to see urgent care. RN checked patient into urgent care and he is waiting in the lobby.    Routing to provider to co-sign RN documentation.     Eleonora Hidalgo BSN, RN

## 2024-05-30 NOTE — PROGRESS NOTES
Assessment & Plan     Acute pain of right shoulder  Patient fell last night onto right shoulder.  There is no evidence of fracture on x-ray.  I advised he is seen by orthopedics for further evaluation of the rotator cuff which we are unable to see with imaging available in urgent care.  I also advised his physical therapist that he is currently working within his home through [facility] adds on treatment of the right shoulder.  I put in referral to [facility] for physical therapy.  - XR Shoulder Right G/E 3 Views  - Physical Therapy  Referral  - Orthopedic  Referral     No follow-ups on file.    Maddison Pruitt, PREETI CNP  [facility]     Harrison is a 78 year old male who presents to clinic today for the following health issues:  Chief Complaint   Patient presents with    Urgent Care     Right shoulder and right elbow pain from fall last night.   (Denies head trauma).      HPI      Review of Systems  Constitutional, HEENT, cardiovascular, pulmonary, gi and gu systems are negative, except as otherwise noted.      Objective    /75   Pulse 75   Temp 98.1  F (36.7  C) (Tympanic)   Resp 18   Wt 70.8 kg (156 lb)   SpO2 94%   BMI 22.38 kg/m    Physical Exam   GENERAL: alert and no distress  MS: tenderness to palpation of anterior right shoulder with limited ability to abduct the right arm due to pain in the right shoulder, full range of motion of right elbow  SKIN: Superficial abrasion to the right proximal forearm that has no surrounding erythema or warmth  NEURO: Normal strength and tone, mentation intact and speech normal  PSYCH: mentation appears normal, affect normal/bright    Results for orders placed or performed in visit on 05/30/24   XR Shoulder Right G/E 3 Views     Status: None    Narrative    RIGHT SHOULDER THREE OR MORE VIEWS  5/30/2024 10:26 AM    INDICATION: Fell last night. Anterior shoulder pain on exam. Rule out  fracture. Acute pain  of right shoulder.    COMPARISON: 3/19/2024      Impression    IMPRESSION: Anatomic alignment right shoulder. No acute displaced  right shoulder fracture is identified. 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  Persistent area of focal horizontal airspace opacification in the  right midlung zone, similar to 3/19/2024.    ADELSO HOLMAN MD         SYSTEM ID:  [id number]

## 2024-05-30 NOTE — PROGRESS NOTES
Patient with fall into door last evening. Since then right shoulder pain. Denied any head injury. Evaluated by RN. See RN note for more detailed information.  Appropriate to wait for urgent care.     Andrew Flores PA-C

## 2024-05-31 ENCOUNTER — TELEPHONE (OUTPATIENT)
Dept: FAMILY MEDICINE | Facility: CLINIC | Age: 79
End: 2024-05-31
Payer: MEDICARE

## 2024-05-31 NOTE — TELEPHONE ENCOUNTER
Home Care is calling regarding an established patient with [facility].        1/19/2024     1:54 PM   Home Care Information   Date of Home Care episode start 1/22/2024   Current following provider Dr. Dozier   Date provider agreed to follow 1/22/2024    Name/Phone Number Isaac MCCLOUD,[phone]   Home Care agency [facility]     Requesting orders from: Yaneli Dozier  Provider is following patient: Yes  Is this a 60-day recertification request?  No    Orders Requested    Skilled Nursing  Request for initial evaluation and treatment (one time)     Physical Therapy  Request for initial evaluation and treatment (one time)     Verbal orders given.  Home Care will send orders for provider to sign.  Confirmed ok to leave a detailed message with call back.  Contact information confirmed and updated as needed.    Allie Robles RN

## 2024-06-03 DIAGNOSIS — J44.9 COPD, VERY SEVERE (H): ICD-10-CM

## 2024-06-03 NOTE — TELEPHONE ENCOUNTER
Disp Refills Start End JESSICA    azithromycin (ZITHROMAX) 500 MG tablet (Discontinued) 45 tablet 3 1/26/2024 3/19/2024 No   Sig - Route: Take 1 tablet (500 mg) by mouth every other day - Oral

## 2024-06-05 RX ORDER — AZITHROMYCIN 500 MG/1
500 TABLET, FILM COATED ORAL EVERY OTHER DAY
Qty: 45 TABLET | Refills: 3 | Status: SHIPPED | OUTPATIENT
Start: 2024-06-05

## 2024-06-05 NOTE — TELEPHONE ENCOUNTER
Azithromycin appears to have been discontinued in error by rooming staff in March.  Was given a year supply 1-  Dx = severe COPD  Pended for PCP to reorder.    Kelsey LOCKHART MA       azithromycin (ZITHROMAX) 500 MG tablet [[id number]] (Order [id number])    Order History  Outpatient  Date/Time Action Taken User Additional Information   01/26/24 1126 Sign Yaneli Dozier MD    03/07/24 1026 Taking Flag Checked Mary Porter MA    03/19/24 1343 Discontinue Nora Givens MA      Outpatient Medication Detail     Disp Refills Start End JESSICA   azithromycin (ZITHROMAX) 500 MG tablet (Discontinued) 45 tablet 3 1/26/2024 3/19/2024 No   Sig - Route: Take 1 tablet (500 mg) by mouth every other day

## 2024-06-07 ENCOUNTER — PATIENT OUTREACH (OUTPATIENT)
Dept: CARE COORDINATION | Facility: CLINIC | Age: 79
End: 2024-06-07
Payer: MEDICARE

## 2024-06-07 NOTE — PROGRESS NOTES
Clinic Care Coordination RN       Patient reports he has had 2 [facility] RN visits but has not had physical therapy yet.  CC RN called [facility] and the tentative plan will be Tuesday for a PT visit and they will call the patient with a time .  Patient informed of this plan     [employer]   Marcela Dickerson RN, Care Coordinator   [facility]   E-mail [email or web address]   [phone]

## 2024-06-07 NOTE — PROGRESS NOTES
Clinic Care Coordination Contact  [facility]/Voicemail    Clinical Data: Care Coordinator Outreach    Outreach Documentation Number of Outreach Attempt   5/29/2024   2:14 PM 1   6/7/2024   1:28 PM 1       Left message on patient's voicemail with call back information and requested return call.    Plan:. Care Coordinator will try to reach patient again in 3-5 business days.    [facility]   Marcela Dickerson RN, Care Coordinator   [facility]'s   E-mail [email or web address]   [phone]

## 2024-06-11 ENCOUNTER — TELEPHONE (OUTPATIENT)
Dept: FAMILY MEDICINE | Facility: CLINIC | Age: 79
End: 2024-06-11
Payer: MEDICARE

## 2024-06-11 NOTE — TELEPHONE ENCOUNTER
WOLF Gallo [facility]  visited Harrison in home today and wanted to report some concenrs:      Reporting pt c/o all the medications he has to take and not taking some of his medications.  Listed below. Most PRN/not critical, but pt also not taking duloxetine.  Nurse was unable to educate, as pt very hyperfixated.  Nurse will visit again later this week.    Triage: Can we call Harrison tomorrow, and ask about   his duloxetine 20mg twice a day  Pt has been off this med, which could be contributing to mood changes.      Meds that nurse states pt not taking:   Duloxetine 20mg bid   Gabapentin - Taking differently - Pt 300mg, one capsule 3 times a day, notes in chart reference 400-600mg doses.   Methocarbamol - not taking - ordered at discharge   Trazodone - Not taking   Several other meds that were not on med list, but in pill box.  Nurse will clean up with pt at next visit.

## 2024-06-12 NOTE — TELEPHONE ENCOUNTER
Pt returned call. States he doesn't have Duloxetine at home. Medication is not listed on current medication list. Pt denies acute mood changes or suicidal thoughts. Triage offered to place MTM referral to help go over medications. Pt states he just wants to wait to discuss medication on his next visit with PCP.      Per chart review, Duloxetine was discontinued. Should pt be taking Duloxetine? Any other recommendations from provider?

## 2024-06-12 NOTE — TELEPHONE ENCOUNTER
Patient Contact    Attempt # 1    Was call answered?  No.  Left message on voicemail with information to call back.    Zaria HAYS, Triage RN  [facility]

## 2024-06-13 ENCOUNTER — TELEPHONE (OUTPATIENT)
Dept: FAMILY MEDICINE | Facility: CLINIC | Age: 79
End: 2024-06-13
Payer: MEDICARE

## 2024-06-13 NOTE — TELEPHONE ENCOUNTER
Home Care is calling regarding an established patient with [facility].        1/19/2024     1:54 PM   Home Care Information   Date of Home Care episode start 1/22/2024   Current following provider Dr. Dozier   Date provider agreed to follow 1/22/2024    Name/Phone Number Haydeeor ROGERS,[phone]   Home Care agency [facility]     Requesting orders from: Yaneli Dozier  Provider is following patient: Yes  Is this a 60-day recertification request?  No    Orders Requested    Physical Therapy  Request for initial certification (first set of orders)   Frequency:  PT 1 visit every other week for 6 weeks       Information was gathered and will be sent to provider for review.  RN will contact Home Care with information after provider review.  Confirmed ok to leave a detailed message with call back.  Contact information confirmed and updated as needed.    Zaria Oh RN

## 2024-06-13 NOTE — TELEPHONE ENCOUNTER
Reason for Call:  Other prescription    Detailed comments: Patient is wondering if he can be prescribed a sedative for an MRI upcoming on his back. He has not scheduled it yet but was told to ask for medication prior. Please call.     Phone Number Patient can be reached at: Cell number on file:    Telephone Information:   Mobile [phone]       Best Time: anytime    Can we leave a detailed message on this number? YES    Call taken on 6/13/2024 at 12:54 PM by Nataliia Miller

## 2024-06-13 NOTE — TELEPHONE ENCOUNTER
Notified homecare nurse who saw Harrison today. No further questions.  Nora Irwin, RN  [facility] RN Triage Team

## 2024-06-14 NOTE — TELEPHONE ENCOUNTER
Yaneli Dozier MD  [place] [facility]1 hour ago (9:47 AM)     BK  He can take his pain medication because we cannot combine sedatives with his medications       Called patient regarding PCP message above. He verbalized understanding.

## 2024-06-17 ENCOUNTER — ANCILLARY PROCEDURE (OUTPATIENT)
Dept: MRI IMAGING | Facility: CLINIC | Age: 79
End: 2024-06-17
Attending: INTERNAL MEDICINE
Payer: MEDICARE

## 2024-06-17 DIAGNOSIS — M54.41 CHRONIC RIGHT-SIDED LOW BACK PAIN WITH RIGHT-SIDED SCIATICA: ICD-10-CM

## 2024-06-17 DIAGNOSIS — M54.50 ACUTE RIGHT-SIDED LOW BACK PAIN WITHOUT SCIATICA: ICD-10-CM
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
## 2024-06-17 DIAGNOSIS — G89.29 CHRONIC RIGHT-SIDED LOW BACK PAIN WITH RIGHT-SIDED SCIATICA: ICD-10-CM

## 2024-06-17 PROCEDURE — G1010 CDSM STANSON: HCPCS | Performed by: STUDENT IN AN ORGANIZED HEALTH CARE EDUCATION/TRAINING PROGRAM

## 2024-06-17 PROCEDURE — 72148 MRI LUMBAR SPINE W/O DYE: CPT | Mod: MF | Performed by: STUDENT IN AN ORGANIZED HEALTH CARE EDUCATION/TRAINING PROGRAM

## 2024-06-18 DIAGNOSIS — S32.592A CLOSED FRACTURE OF LEFT INFERIOR PUBIC RAMUS, INITIAL ENCOUNTER (H): ICD-10-CM

## 2024-06-18 DIAGNOSIS — S79.912D INJURY OF LEFT HIP, SUBSEQUENT ENCOUNTER: Primary | ICD-10-CM

## 2024-06-19 ENCOUNTER — OFFICE VISIT (OUTPATIENT)
Dept: FAMILY MEDICINE | Facility: CLINIC | Age: 79
End: 2024-06-19
Payer: MEDICARE

## 2024-06-19 VITALS
RESPIRATION RATE: 18 BRPM | HEIGHT: 70 IN | TEMPERATURE: 96.9 F | DIASTOLIC BLOOD PRESSURE: 71 MMHG | HEART RATE: 70 BPM | WEIGHT: 161 LBS | BODY MASS INDEX: 23.05 KG/M2 | SYSTOLIC BLOOD PRESSURE: 135 MMHG | OXYGEN SATURATION: 95 %

## 2024-06-19 DIAGNOSIS — I10 ESSENTIAL HYPERTENSION, BENIGN: ICD-10-CM

## 2024-06-19 DIAGNOSIS — R29.6 FALLS FREQUENTLY: ICD-10-CM

## 2024-06-19 DIAGNOSIS — S32.000G LUMBAR COMPRESSION FRACTURE, WITH DELAYED HEALING, SUBSEQUENT ENCOUNTER: ICD-10-CM

## 2024-06-19 DIAGNOSIS — S32.592D CLOSED FRACTURE OF LEFT INFERIOR PUBIC RAMUS WITH ROUTINE HEALING, SUBSEQUENT ENCOUNTER: ICD-10-CM

## 2024-06-19 DIAGNOSIS — M48.061 SPINAL STENOSIS OF LUMBAR REGION WITHOUT NEUROGENIC CLAUDICATION: Primary | ICD-10-CM

## 2024-06-19 PROCEDURE — 99215 OFFICE O/P EST HI 40 MIN: CPT | Performed by: PHYSICIAN ASSISTANT

## 2024-06-19 ASSESSMENT — PAIN SCALES - GENERAL: PAINLEVEL: NO PAIN (0)
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
## 2024-06-19 NOTE — PROGRESS NOTES
[facility]  Sports Medicine Clinic  Clinics and Surgery Center           SUBJECTIVE       Harrison Thomas is a 78 year old male presenting to clinic today for a follow-up of the pelvis.  Patient also accompanied by his son-in-law today.  He was seen recently by his PCP who ordered an MRI of his lumbar spine, they have yet to follow the results up.  Patient is overall doing well, continuing to use the wheeled walker.    5/23/24: Closed fracture of left inferior pubic ramus, initial encounter (H)        78-year-old male following up today at the 4-week katerin for the inferior pubic rami fracture.  Patient is overall doing well.  Has been using an assistive device for walking, four-wheel walker.  When he drives, he will use his cane.  He is not having any pain.  His range of motion is improved.  Patient has chronic pain into the hip joint which seems secondary to the total hip arthroplasty that he had multiple years ago.  I had a long discussion with the patient in terms of healing the inferior pubic rami fracture which will take roughly 10 to 12 weeks.  He is 4 weeks and.  Therefore we will see him in 4 weeks with new x-rays.  X-rays today were discussed with him and did not show any overt evidence of the fracture that was seen on CT.  As the patient continues to do better, we can refer him to one of our hip surgeons given the chronic pain in the inguinal crease, that he has had since his surgery into the hip.  All questions have been answered.  Return precautions advised       PMH, Medications and Allergies were reviewed and updated as needed.    ROS:  As noted above otherwise negative.    Patient Active Problem List   Diagnosis    Essential hypertension, benign    Pain in joint, upper arm    Allergic rhinitis    Pain in joint, lower leg    Chronic airway obstruction (H)    Monoclonal paraproteinemia    Immunodeficiency (H24)    Eczema    Transient cerebral ischemia    Bunion    Occipital neuralgia     Hyperlipidemia LDL goal <100    Low back pain    Olecranon bursitis of right elbow    Bruit    Retina hole    signed & scanned on 09/23/2011  (1-4-2013 printed but not scanned in)     Chronic, continuous use of opioids    Atherosclerosis of native coronary artery of native heart without angina pectoris    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, unspecified thyrotoxicosis type    Right-sided low back pain with right-sided sciatica    Coronary artery disease involving native coronary artery of native heart without angina pectoris    BRENNEN (obstructive sleep apnea)    Hammer toe of right foot    Hallux limitus of right foot    Corn of toe    Non-recurrent unilateral inguinal hernia with obstruction without gangrene    Left inguinal hernia    Metastasis from thyroid cancer (H)    Pulmonary nodules    Preoperative examination    Chronic pain disorder    Dehydration    Shock (H)    CALLUM (acute kidney injury) (H24)    COPD, very severe (H)    Hypokalemia    Hypoxia    Spleen hematoma, initial encounter    Fall, initial encounter    SAH (subarachnoid hemorrhage) (H)    Malignant neoplasm metastatic to lung, unspecified laterality (H)    Hospital-acquired pneumonia    Radiation pneumonitis (H24)    Physical deconditioning    Closed nondisplaced fracture of pelvis, unspecified part of pelvis, initial encounter (H)       Current Outpatient Medications   Medication Sig Dispense Refill    acetaminophen (TYLENOL) 500 MG tablet Take 1,000 mg by mouth 3 times daily      acetylcysteine (MUCOMYST) 20 % neb solution Take 2 mLs by nebulization 4 times daily      albuterol (ACCUNEB) 1.25 MG/3ML neb solution Take 1 vial (1.25 mg) by nebulization every 4 hours as needed for shortness of breath or wheezing 90 mL 4    albuterol (PROAIR HFA/PROVENTIL HFA/VENTOLIN HFA) 108 (90 Base) MCG/ACT inhaler INHALE 2 PUFFS BY MOUTH EVERY 6 HOURS AS NEEDED FOR WHEEZE OR FOR SHORTNESS OF BREATH 18 g 3    alendronate (FOSAMAX) 70 MG tablet Take 1 tablet (70  mg) by mouth every 7 days 12 tablet 3    amLODIPine (NORVASC) 5 MG tablet Take 1 tablet (5 mg) by mouth at bedtime 90 tablet 3    ascorbic acid 1000 MG TABS tablet Take 2,000 mg by mouth daily      aspirin 81 MG EC tablet Take 81 mg by mouth 2 times daily      azithromycin (ZITHROMAX) 500 MG tablet Take 1 tablet (500 mg) by mouth every other day 45 tablet 3    calcium carbonate (OS-KARLIE) 1500 (600 Ca) MG tablet Take 600 mg by mouth daily      carboxymethylcellulose PF (REFRESH PLUS) 0.5 % ophthalmic solution Place 1 drop into both eyes every hour as needed for dry eyes      cholecalciferol 25 MCG (1000 UT) TABS Take 1,000 Units by mouth daily       Fluticasone-Umeclidin-Vilanterol (TRELEGY ELLIPTA) 200-62.5-25 MCG/ACT oral inhaler Inhale 1 puff into the lungs daily In AM. Rinse mouth after use. 60 each 11    furosemide (LASIX) 20 MG tablet Take 1-2 tablets (20-40 mg) by mouth every other day Give 40 mg by mouth  every other day for COPD AND Give 20 mg by mouth very other day HTN, 90 tablet 3    gabapentin (NEURONTIN) 400 MG capsule Take 1 capsule (400 mg) by mouth 3 times daily (Patient taking differently: Take 600 mg by mouth 3 times daily) 270 capsule 3    hydrocortisone 2.5 % cream Apply topically 2 times daily as needed for itching      KLOR-CON 20 MEQ CR tablet TAKE 1 TABLET BY MOUTH 2 TIMES DAILY 180 tablet 3    levothyroxine (SYNTHROID/LEVOTHROID) 125 MCG tablet Take 1 tablet (125 mcg) by mouth daily 90 tablet 3    MegaRed Omega-3 Krill Oil 500 MG CAPS Take 500 mg by mouth daily      Melatonin 10 MG TABS tablet Take 10 mg by mouth nightly as needed      methocarbamol (ROBAXIN) 500 MG tablet Take 1 tablet (500 mg) by mouth 4 times daily 120 tablet 0    metoprolol succinate ER (TOPROL XL) 50 MG 24 hr tablet Take 1 tablet (50 mg) by mouth every evening      multivitamin w/minerals (THERA-VIT-M) tablet Take 1 tablet by mouth daily       nitroGLYcerin (NITROSTAT) 0.4 MG sublingual tablet FOR CHEST PAIN PLACE 1 TAB  UNDER TONGUE EVERY 5 MIN FOR 3 DOSES. IF SYMPTOMS PERSIST 5 MIN AFTER 1ST DOSE CALL 911 25 tablet 3    oxyCODONE (ROXICODONE) 5 MG tablet Take 1 tablet (5 mg) by mouth every 6 hours as needed for severe pain 120 tablet 0    predniSONE (DELTASONE) 1 MG tablet Take 2 tablets (2 mg) by mouth daily For COPD 60 tablet 4    rosuvastatin (CRESTOR) 10 MG tablet TAKE 1 TABLET BY MOUTH EVERY DAY (Patient taking differently: Take 10 mg by mouth every evening) 90 tablet 3    senna-docusate (SENOKOT-S/PERICOLACE) 8.6-50 MG tablet Take 1 tablet by mouth 2 times daily Hold for loose stools      traZODone (DESYREL) 50 MG tablet Take 1 tablet (50 mg) by mouth at bedtime 30 tablet 0            OBJECTIVE:       Vitals: There were no vitals filed for this visit.  BMI: There is no height or weight on file to calculate BMI.    Gen:  Well nourished and in no acute distress  HEENT: Extraocular movement intact  Neck: Supple  Pulm:  Breathing Comfortably. No increased respiratory effort.  Psych: Euthymic. Appropriately answers questions    MSK: Assisted gait with the 4 wheeled walker, nonantalgic.  No ambulatory pain.  Full range of motion of the bilateral hips, no palpable tenderness.  Strength is equal bilaterally in hip flexion and abduction.  Adduction is also appropriate.    Negative FADIR and MARVEL.  Negative logroll.    Entirety of examination was completed with the patient sitting in the chair.      Study Result    Narrative & Impression   1 views pelvis radiograph(s) 5/23/2024 1:31 PM     History: Closed fracture of left inferior pubic ramus, initial  encounter (H)     Comparison: CT pelvis 4/26/2024     Findings:     Supine AP view(s) of the pelvis were obtained.      Postsurgical changes of bilateral total hip arthroplasty. Mild  heterotopic bone formation about both hips laterally.     Healing left inferior pubic ramus fracture indicated by mild  periostitis. Superior pubic ramus fracture not well seen  radiographically.      Vascular calcifications. Surgical clips overlie scrotum.                                                                      Impression: Healing left inferior pubic ramus fracture indicated by  mild periostitis. Superior pubic ramus fracture not well seen  radiographically     I have personally reviewed the examination and initial interpretation  and I agree with the findings.             ASSESSMENT and PLAN:     Harrison was seen today for pain.    Diagnoses and all orders for this visit:    Closed fracture of left inferior pubic ramus, initial encounter (H)    Injury of left hip, subsequent encounter    Closed fracture of sacrum, unspecified portion of sacrum, initial encounter (H)      Harrison is a 78-year-old very pleasant male, following up at week 8 out of 12 of the inferior pubic rami fractures.  Patient is overall doing well from that standpoint.   The patient's PCP did order an MRI of his lumbar spine which was briefly discussed with the patient today, which did show evidence of compression fractures as well as osteoarthrosis, as well as a left-sided sacral alar fracture.  The patient does have a longstanding history of low bone mineral density as well, with discussions of starting Prolia for that by his PCP.  In terms of the sacral alar fracture, without the patient having any neurologic deficits, given the stability of his pelvic fractures, he is currently comanaging those with the physical therapy and assistive device.  I did discuss with the patient sending him to one of our spine providers given the osteoarthrosis as well as the compression fractures, but given the fact the patient is being managed for this by his PCP, it is most prudent for him to follow-up with his primary care physician to discuss these findings as well as alteration/additions/changes to his bone mineral medications.  In the setting of the sacral alar fracture, I do think it would be most ideal for the patient to remain on the 4  wheeled walker, as the healing time for that can be roughly the same as the pelvic fractures, and in the setting of his osteoporosis, would lengthen the amount of time he may need this assistive device.  However, relative rest but early mobilization is ideal in this setting, so have recommend the patient continue his physical therapy.  They can ultimately decide if they want to send him to one of the spine providers.  The patient's son-in-law is also wondering about home care adjustments given the fact that he lives with them, there are 2 bathrooms on the second floor and 1 bathroom on the first floor.  This is also a discussion that would best be managed by his primary care physician.    In terms of his pelvis the patient is currently doing well.  We will see him again in 4 weeks with new x-rays.  Should continue PT.  Return and ER precautions have been advised.    Options for treatment and/or follow-up care were reviewed with the patient was actively involved in the decision making process. Patient verbalized understanding and was in agreement with the plan.    Emory Garcia DO  , Sports Medicine  Department of Family Medicine and [facility]

## 2024-06-20 ENCOUNTER — ANCILLARY PROCEDURE (OUTPATIENT)
Dept: GENERAL RADIOLOGY | Facility: CLINIC | Age: 79
End: 2024-06-20
Attending: STUDENT IN AN ORGANIZED HEALTH CARE EDUCATION/TRAINING PROGRAM
Payer: MEDICARE

## 2024-06-20 ENCOUNTER — OFFICE VISIT (OUTPATIENT)
Dept: ORTHOPEDICS | Facility: CLINIC | Age: 79
End: 2024-06-20
Payer: MEDICARE

## 2024-06-20 ENCOUNTER — MYC MEDICAL ADVICE (OUTPATIENT)
Dept: FAMILY MEDICINE | Facility: CLINIC | Age: 79
End: 2024-06-20

## 2024-06-20 DIAGNOSIS — S32.592A CLOSED FRACTURE OF LEFT INFERIOR PUBIC RAMUS, INITIAL ENCOUNTER (H): Primary | ICD-10-CM

## 2024-06-20 DIAGNOSIS — S32.10XA CLOSED FRACTURE OF SACRUM, UNSPECIFIED PORTION OF SACRUM, INITIAL ENCOUNTER (H): ICD-10-CM

## 2024-06-20 DIAGNOSIS — S79.912D INJURY OF LEFT HIP, SUBSEQUENT ENCOUNTER: ICD-10-CM

## 2024-06-20 DIAGNOSIS — S32.592A CLOSED FRACTURE OF LEFT INFERIOR PUBIC RAMUS, INITIAL ENCOUNTER (H): ICD-10-CM

## 2024-06-20 PROCEDURE — 72170 X-RAY EXAM OF PELVIS: CPT | Performed by: RADIOLOGY

## 2024-06-20 PROCEDURE — 99213 OFFICE O/P EST LOW 20 MIN: CPT | Performed by: STUDENT IN AN ORGANIZED HEALTH CARE EDUCATION/TRAINING PROGRAM

## 2024-06-20 NOTE — LETTER
6/20/2024      RE: Harrison Thomas  [address] [address] [id number]       Dear Colleague,    Thank you for referring your patient, Harrison Thomas, to the [facility] SPOR[facility] MEDICINE [facility] [place]. Please see a copy of my visit note below.    [facility]  Sports Medicine Clinic  Clinics and Surgery Center           SUBJECTIVE       Harrison Thomas is a 78 year old male presenting to clinic today for a follow-up of the pelvis.  Patient also accompanied by his son-in-law today.  He was seen recently by his PCP who ordered an MRI of his lumbar spine, they have yet to follow the results up.  Patient is overall doing well, continuing to use the wheeled walker.    5/23/24: Closed fracture of left inferior pubic ramus, initial encounter (H)        78-year-old male following up today at the 4-week katerin for the inferior pubic rami fracture.  Patient is overall doing well.  Has been using an assistive device for walking, four-wheel walker.  When he drives, he will use his cane.  He is not having any pain.  His range of motion is improved.  Patient has chronic pain into the hip joint which seems secondary to the total hip arthroplasty that he had multiple years ago.  I had a long discussion with the patient in terms of healing the inferior pubic rami fracture which will take roughly 10 to 12 weeks.  He is 4 weeks and.  Therefore we will see him in 4 weeks with new x-rays.  X-rays today were discussed with him and did not show any overt evidence of the fracture that was seen on CT.  As the patient continues to do better, we can refer him to one of our hip surgeons given the chronic pain in the inguinal crease, that he has had since his surgery into the hip.  All questions have been answered.  Return precautions advised       PMH, Medications and Allergies were reviewed and updated as needed.    ROS:  As noted above otherwise negative.    Patient Active Problem List   Diagnosis     Essential hypertension, benign    Pain in joint, upper arm    Allergic rhinitis    Pain in joint, lower leg    Chronic airway obstruction (H)    Monoclonal paraproteinemia    Immunodeficiency (H24)    Eczema    Transient cerebral ischemia    Bunion    Occipital neuralgia    Hyperlipidemia LDL goal <100    Low back pain    Olecranon bursitis of right elbow    Bruit    Retina hole    signed & scanned on 09/23/2011  (1-4-2013 printed but not scanned in)     Chronic, continuous use of opioids    Atherosclerosis of native coronary artery of native heart without angina pectoris    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, unspecified thyrotoxicosis type    Right-sided low back pain with right-sided sciatica    Coronary artery disease involving native coronary artery of native heart without angina pectoris    BRENNEN (obstructive sleep apnea)    Hammer toe of right foot    Hallux limitus of right foot    Corn of toe    Non-recurrent unilateral inguinal hernia with obstruction without gangrene    Left inguinal hernia    Metastasis from thyroid cancer (H)    Pulmonary nodules    Preoperative examination    Chronic pain disorder    Dehydration    Shock (H)    CALLUM (acute kidney injury) (H24)    COPD, very severe (H)    Hypokalemia    Hypoxia    Spleen hematoma, initial encounter    Fall, initial encounter    SAH (subarachnoid hemorrhage) (H)    Malignant neoplasm metastatic to lung, unspecified laterality (H)    Hospital-acquired pneumonia    Radiation pneumonitis (H24)    Physical deconditioning    Closed nondisplaced fracture of pelvis, unspecified part of pelvis, initial encounter (H)       Current Outpatient Medications   Medication Sig Dispense Refill    acetaminophen (TYLENOL) 500 MG tablet Take 1,000 mg by mouth 3 times daily      acetylcysteine (MUCOMYST) 20 % neb solution Take 2 mLs by nebulization 4 times daily      albuterol (ACCUNEB) 1.25 MG/3ML neb solution Take 1 vial (1.25 mg) by nebulization every 4 hours as needed for  shortness of breath or wheezing 90 mL 4    albuterol (PROAIR HFA/PROVENTIL HFA/VENTOLIN HFA) 108 (90 Base) MCG/ACT inhaler INHALE 2 PUFFS BY MOUTH EVERY 6 HOURS AS NEEDED FOR WHEEZE OR FOR SHORTNESS OF BREATH 18 g 3    alendronate (FOSAMAX) 70 MG tablet Take 1 tablet (70 mg) by mouth every 7 days 12 tablet 3    amLODIPine (NORVASC) 5 MG tablet Take 1 tablet (5 mg) by mouth at bedtime 90 tablet 3    ascorbic acid 1000 MG TABS tablet Take 2,000 mg by mouth daily      aspirin 81 MG EC tablet Take 81 mg by mouth 2 times daily      azithromycin (ZITHROMAX) 500 MG tablet Take 1 tablet (500 mg) by mouth every other day 45 tablet 3    calcium carbonate (OS-KARLIE) 1500 (600 Ca) MG tablet Take 600 mg by mouth daily      carboxymethylcellulose PF (REFRESH PLUS) 0.5 % ophthalmic solution Place 1 drop into both eyes every hour as needed for dry eyes      cholecalciferol 25 MCG (1000 UT) TABS Take 1,000 Units by mouth daily       Fluticasone-Umeclidin-Vilanterol (TRELEGY ELLIPTA) 200-62.5-25 MCG/ACT oral inhaler Inhale 1 puff into the lungs daily In AM. Rinse mouth after use. 60 each 11    furosemide (LASIX) 20 MG tablet Take 1-2 tablets (20-40 mg) by mouth every other day Give 40 mg by mouth  every other day for COPD AND Give 20 mg by mouth very other day HTN, 90 tablet 3    gabapentin (NEURONTIN) 400 MG capsule Take 1 capsule (400 mg) by mouth 3 times daily (Patient taking differently: Take 600 mg by mouth 3 times daily) 270 capsule 3    hydrocortisone 2.5 % cream Apply topically 2 times daily as needed for itching      KLOR-CON 20 MEQ CR tablet TAKE 1 TABLET BY MOUTH 2 TIMES DAILY 180 tablet 3    levothyroxine (SYNTHROID/LEVOTHROID) 125 MCG tablet Take 1 tablet (125 mcg) by mouth daily 90 tablet 3    MegaRed Omega-3 Krill Oil 500 MG CAPS Take 500 mg by mouth daily      Melatonin 10 MG TABS tablet Take 10 mg by mouth nightly as needed      methocarbamol (ROBAXIN) 500 MG tablet Take 1 tablet (500 mg) by mouth 4 times daily 120  tablet 0    metoprolol succinate ER (TOPROL XL) 50 MG 24 hr tablet Take 1 tablet (50 mg) by mouth every evening      multivitamin w/minerals (THERA-VIT-M) tablet Take 1 tablet by mouth daily       nitroGLYcerin (NITROSTAT) 0.4 MG sublingual tablet FOR CHEST PAIN PLACE 1 TAB UNDER TONGUE EVERY 5 MIN FOR 3 DOSES. IF SYMPTOMS PERSIST 5 MIN AFTER 1ST DOSE CALL 911 25 tablet 3    oxyCODONE (ROXICODONE) 5 MG tablet Take 1 tablet (5 mg) by mouth every 6 hours as needed for severe pain 120 tablet 0    predniSONE (DELTASONE) 1 MG tablet Take 2 tablets (2 mg) by mouth daily For COPD 60 tablet 4    rosuvastatin (CRESTOR) 10 MG tablet TAKE 1 TABLET BY MOUTH EVERY DAY (Patient taking differently: Take 10 mg by mouth every evening) 90 tablet 3    senna-docusate (SENOKOT-S/PERICOLACE) 8.6-50 MG tablet Take 1 tablet by mouth 2 times daily Hold for loose stools      traZODone (DESYREL) 50 MG tablet Take 1 tablet (50 mg) by mouth at bedtime 30 tablet 0            OBJECTIVE:       Vitals: There were no vitals filed for this visit.  BMI: There is no height or weight on file to calculate BMI.    Gen:  Well nourished and in no acute distress  HEENT: Extraocular movement intact  Neck: Supple  Pulm:  Breathing Comfortably. No increased respiratory effort.  Psych: Euthymic. Appropriately answers questions    MSK: Assisted gait with the 4 wheeled walker, nonantalgic.  No ambulatory pain.  Full range of motion of the bilateral hips, no palpable tenderness.  Strength is equal bilaterally in hip flexion and abduction.  Adduction is also appropriate.    Negative FADIR and MARVEL.  Negative logroll.    Entirety of examination was completed with the patient sitting in the chair.      Study Result    Narrative & Impression   1 views pelvis radiograph(s) 5/23/2024 1:31 PM     History: Closed fracture of left inferior pubic ramus, initial  encounter (H)     Comparison: CT pelvis 4/26/2024     Findings:     Supine AP view(s) of the pelvis were  obtained.      Postsurgical changes of bilateral total hip arthroplasty. Mild  heterotopic bone formation about both hips laterally.     Healing left inferior pubic ramus fracture indicated by mild  periostitis. Superior pubic ramus fracture not well seen  radiographically.     Vascular calcifications. Surgical clips overlie scrotum.                                                                      Impression: Healing left inferior pubic ramus fracture indicated by  mild periostitis. Superior pubic ramus fracture not well seen  radiographically     I have personally reviewed the examination and initial interpretation  and I agree with the findings.             ASSESSMENT and PLAN:     Harrison was seen today for pain.    Diagnoses and all orders for this visit:    Closed fracture of left inferior pubic ramus, initial encounter (H)    Injury of left hip, subsequent encounter    Closed fracture of sacrum, unspecified portion of sacrum, initial encounter (H)      Harrison is a 78-year-old very pleasant male, following up at week 8 out of 12 of the inferior pubic rami fractures.  Patient is overall doing well from that standpoint.   The patient's PCP did order an MRI of his lumbar spine which was briefly discussed with the patient today, which did show evidence of compression fractures as well as osteoarthrosis, as well as a left-sided sacral alar fracture.  The patient does have a longstanding history of low bone mineral density as well, with discussions of starting Prolia for that by his PCP.  In terms of the sacral alar fracture, without the patient having any neurologic deficits, given the stability of his pelvic fractures, he is currently comanaging those with the physical therapy and assistive device.  I did discuss with the patient sending him to one of our spine providers given the osteoarthrosis as well as the compression fractures, but given the fact the patient is being managed for this by his PCP, it is most  prudent for him to follow-up with his primary care physician to discuss these findings as well as alteration/additions/changes to his bone mineral medications.  In the setting of the sacral alar fracture, I do think it would be most ideal for the patient to remain on the 4 wheeled walker, as the healing time for that can be roughly the same as the pelvic fractures, and in the setting of his osteoporosis, would lengthen the amount of time he may need this assistive device.  However, relative rest but early mobilization is ideal in this setting, so have recommend the patient continue his physical therapy.  They can ultimately decide if they want to send him to one of the spine providers.  The patient's son-in-law is also wondering about home care adjustments given the fact that he lives with them, there are 2 bathrooms on the second floor and 1 bathroom on the first floor.  This is also a discussion that would best be managed by his primary care physician.    In terms of his pelvis the patient is currently doing well.  We will see him again in 4 weeks with new x-rays.  Should continue PT.  Return and ER precautions have been advised.    Options for treatment and/or follow-up care were reviewed with the patient was actively involved in the decision making process. Patient verbalized understanding and was in agreement with the plan.          Again, thank you for allowing me to participate in the care of your patient.      Sincerely,    Emory Garcia, DO

## 2024-06-21 ENCOUNTER — PATIENT OUTREACH (OUTPATIENT)
Dept: CARE COORDINATION | Facility: CLINIC | Age: 79
End: 2024-06-21
Payer: MEDICARE

## 2024-06-21 ENCOUNTER — TELEPHONE (OUTPATIENT)
Dept: FAMILY MEDICINE | Facility: CLINIC | Age: 79
End: 2024-06-21
Payer: MEDICARE

## 2024-06-21 NOTE — PROGRESS NOTES
Clinic Care Coordination Contact  Follow Up Progress Note     Stella Utilization: Clinical Data: Patient was hospitalized at [facility] from 4/26/2024 to 4/28/2024 with diagnosis of Mechanical fall, Non-displaced fracture of the left pubic rami, COPD, CAD, HTN.      Assessment: Patient has transitioned to TCU/ARU for short term rehabilitation:     Facility Name: [facility]      Assessment: Patient continues home care PT and is getting stronger.  Patient saw the Sports Medicine provider and was informed he is healing well.  No other needs at this time  Patient states he will continue home care PT for awhile yet     Care Gaps:    Health Maintenance Due   Topic Date Due    COPD ACTION PLAN  07/28/2018    PSA  12/03/2023    COVID-19 Vaccine (8 - 2023-24 season) 12/15/2023           Intervention/Education provided during outreach: Care coordination is available in the future as needed      Outreach Frequency: weekly, more frequently as needed        Plan:   No unmet needs, no further care coordination is needed at this time   [facility]   Marcela Dickerson RN, Care Coordinator   [facility]'s   E-mail [email or web address]   [phone]

## 2024-06-21 NOTE — TELEPHONE ENCOUNTER
Reason for Call:  Appointment Request    Patient requesting this type of appt: Chronic Diease Management/Medication/Follow-Up    Requested provider: Yaneli Dozier    Reason patient unable to be scheduled: Not within requested timeframe    When does patient want to be seen/preferred time: 1-2 days    Comments: Patient was told to make a virtual apt to discuss injections for his back pain.  Nothing available until the end of July and patient would like to be seen earlier if possible.      Could we send this information to you in [facility] or would you prefer to receive a phone call?:   Patient would prefer a phone call   Okay to leave a detailed message?: Yes at Cell number on file:    Telephone Information:   Mobile [phone]       Call taken on 6/21/2024 at 9:59 AM by Sruthi Meek

## 2024-06-24 NOTE — TELEPHONE ENCOUNTER
Patient called back to schedule - no virtual appointments available until end of June. OK to wait for 07/26? Pls advise w date/time if needed sooner.   Yayd Sharp, CMA

## 2024-06-25 ENCOUNTER — TELEPHONE (OUTPATIENT)
Dept: NEUROLOGY | Facility: CLINIC | Age: 79
End: 2024-06-25
Payer: MEDICARE

## 2024-06-25 NOTE — TELEPHONE ENCOUNTER
Patient confirmed scheduled appointment:  Date: 12/4  Time: 2:30 pm  Visit type: New Neurology   Provider: Wicho   Location:  location - pt aware per pt request  Testing/imaging: n/a  Additional notes: In basket TE

## 2024-06-27 ENCOUNTER — TELEPHONE (OUTPATIENT)
Dept: FAMILY MEDICINE | Facility: CLINIC | Age: 79
End: 2024-06-27
Payer: MEDICARE

## 2024-06-27 NOTE — TELEPHONE ENCOUNTER
"Received a call from ROGERS Chairez with [facility]. Contreras would like a message passed along to Dr. Dozier that \"home care is agreement that the patient is needing a higher level of care.\"    Contreras notes the patient has been in communication with PCP regarding needing a \"medical needs letter.\" Contreras states Dr. Dozier can mentioned the above statement from home care if this would help the patient's cause.     Will route to PCP as OSMANI.     Analy Gusman RN  "

## 2024-07-01 ENCOUNTER — TELEPHONE (OUTPATIENT)
Dept: FAMILY MEDICINE | Facility: CLINIC | Age: 79
End: 2024-07-01
Payer: MEDICARE

## 2024-07-01 DIAGNOSIS — S32.592D CLOSED FRACTURE OF LEFT INFERIOR PUBIC RAMUS WITH ROUTINE HEALING, SUBSEQUENT ENCOUNTER: Primary | ICD-10-CM

## 2024-07-01 RX ORDER — OXYCODONE HYDROCHLORIDE 5 MG/1
5 TABLET ORAL EVERY 6 HOURS PRN
Qty: 120 TABLET | Refills: 0 | Status: SHIPPED | OUTPATIENT
Start: 2024-07-01 | End: 2024-07-31

## 2024-07-01 NOTE — TELEPHONE ENCOUNTER
Home Care is calling regarding an established patient with [facility].        1/19/2024     1:54 PM   Home Care Information   Date of Home Care episode start 1/22/2024   Current following provider Dr. Dozier   Date provider agreed to follow 1/22/2024    Name/Phone Number Isaac MCCLOUD,[phone]   Home Care agency [facility]     Requesting orders from: Yaneli Dozier  Provider is following patient: Yes  Is this a 60-day recertification request?  No    Orders Requested    Physical Therapy  Request for continuation of care with increase in frequency  Frequency:  1x/wk for 4 wks    Strength, balance, safe mobility     Verbal orders given.  Home Care will send orders for provider to sign.  Confirmed ok to leave a detailed message with call back.  Contact information confirmed and updated as needed.    Rebecca Salmeron RN

## 2024-07-10 ENCOUNTER — ANCILLARY PROCEDURE (OUTPATIENT)
Dept: CT IMAGING | Facility: CLINIC | Age: 79
End: 2024-07-10
Attending: PHYSICIAN ASSISTANT
Payer: MEDICARE

## 2024-07-10 ENCOUNTER — TRANSFERRED RECORDS (OUTPATIENT)
Dept: HEALTH INFORMATION MANAGEMENT | Facility: CLINIC | Age: 79
End: 2024-07-10
Payer: MEDICARE

## 2024-07-10 DIAGNOSIS — R91.8 LUNG NODULES: ICD-10-CM

## 2024-07-10 PROCEDURE — 71250 CT THORAX DX C-: CPT

## 2024-07-12 ENCOUNTER — TELEPHONE (OUTPATIENT)
Dept: INTERNAL MEDICINE | Facility: CLINIC | Age: 79
End: 2024-07-12
Payer: MEDICARE

## 2024-07-12 NOTE — TELEPHONE ENCOUNTER
Home Care is calling regarding an established patient with [facility].        1/19/2024     1:54 PM   Home Care Information   Date of Home Care episode start 1/22/2024   Current following provider Dr. Dozier   Date provider agreed to follow 1/22/2024    Name/Phone Number Haydeeor ROGERS,[phone]   Home Care agency [facility]     Requesting orders from: Yaneli Dozier  Provider is following patient: Yes  Is this a 60-day recertification request?  No    Orders Requested    Physical Therapy  Request for delay in care, service is not able to be provided within same scheduled day.     PT re-assessment (not re-certification) needs to be rescheduled to next week due to staffing. Date of visit not yet determined but PT staff confirms it will be next week.        Information was gathered and will be sent to provider for review.  RN will contact Home Care with information after provider review.  Confirmed ok to leave a detailed message with call back.  Contact information confirmed and updated as needed.    Renetta Prescott RN

## 2024-07-15 ENCOUNTER — TELEPHONE (OUTPATIENT)
Dept: PULMONOLOGY | Facility: CLINIC | Age: 79
End: 2024-07-15
Payer: MEDICARE

## 2024-07-15 DIAGNOSIS — J18.9 PNEUMONIA DUE TO INFECTIOUS ORGANISM, UNSPECIFIED LATERALITY, UNSPECIFIED PART OF LUNG: ICD-10-CM

## 2024-07-15 DIAGNOSIS — J70.0 RADIATION PNEUMONITIS (H): ICD-10-CM

## 2024-07-15 DIAGNOSIS — J42 CHRONIC BRONCHITIS, UNSPECIFIED CHRONIC BRONCHITIS TYPE (H): ICD-10-CM

## 2024-07-15 DIAGNOSIS — J43.8 OTHER EMPHYSEMA (H): ICD-10-CM

## 2024-07-15 DIAGNOSIS — R91.8 MASS OF UPPER LOBE OF RIGHT LUNG: ICD-10-CM

## 2024-07-15 DIAGNOSIS — G47.33 OBSTRUCTIVE SLEEP APNEA: ICD-10-CM

## 2024-07-15 DIAGNOSIS — R93.89 ABNORMAL CHEST CT: ICD-10-CM

## 2024-07-15 RX ORDER — FLUTICASONE FUROATE, UMECLIDINIUM BROMIDE AND VILANTEROL TRIFENATATE 200; 62.5; 25 UG/1; UG/1; UG/1
1 POWDER RESPIRATORY (INHALATION) DAILY
Qty: 180 EACH | Refills: 3 | Status: SHIPPED | OUTPATIENT
Start: 2024-07-15 | End: 2024-08-19

## 2024-07-15 NOTE — TELEPHONE ENCOUNTER
Patient called in requesting Trelegy prescription to be sent to [facility]. Based on patient's visit with Ole in April, patient was going be exploring Trelegy to be filled by [facility]. Patient said he has been using Trelegy inhalers from hospital. He did not know anything about [employer]. Writer forwarded message to Zina Son to reach out to patient to explain [employer] enrollment.

## 2024-07-15 NOTE — TELEPHONE ENCOUNTER
Spoke with patient and daughter Amrita regarding [employer] enrollment forms and what to submit. Amrita will work on the enrollment forms and other required paperwork (EOB, copy of insurance card) and will give me a call back once they are submitted to [employer] so that the clinic can fax over the signed Trelegy rx.

## 2024-07-17 DIAGNOSIS — J44.1 CHRONIC OBSTRUCTIVE PULMONARY DISEASE WITH ACUTE EXACERBATION (H): ICD-10-CM

## 2024-07-17 NOTE — PROGRESS NOTES
[facility]  Sports Medicine Clinic  Cl[facility] and [facility]           SUBJECTIVE       Harrison Thomas is a 78 year old male presenting to clinic today fo a follow-up of the left hip/pelvis fx. He is currently at the 12 week katerin for follow-up. .  Harrison is overall doing very well.  He is using a walker and his cane, without any pain in the pelvis.  He is also scheduled to see a spine provider given some of the back issues.  He is not having any weakness beyond baseline.  No numbness and tingling.  No new falls.    6/20/24: Closed fracture of left inferior pubic ramus, initial encounter (H)     Injury of left hip, subsequent encounter     Closed fracture of sacrum, unspecified portion of sacrum, initial encounter (H)        Harrison is a 78-year-old very pleasant male, following up at week 8 out of 12 of the inferior pubic rami fractures.  Patient is overall doing well from that standpoint.   The patient's PCP did order an MRI of his lumbar spine which was briefly discussed with the patient today, which did show evidence of compression fractures as well as osteoarthrosis, as well as a left-sided sacral alar fracture.  The patient does have a longstanding history of low bone mineral density as well, with discussions of starting Prolia for that by his PCP.  In terms of the sacral alar fracture, without the patient having any neurologic deficits, given the stability of his pelvic fractures, he is currently comanaging those with the physical therapy and assistive device.  I did discuss with the patient sending him to one of our spine providers given the osteoarthrosis as well as the compression fractures, but given the fact the patient is being managed for this by his PCP, it is most prudent for him to follow-up with his primary care physician to discuss these findings as well as alteration/additions/changes to his bone mineral medications.  In the setting of the sacral alar fracture, I do think it would  be most ideal for the patient to remain on the 4 wheeled walker, as the healing time for that can be roughly the same as the pelvic fractures, and in the setting of his osteoporosis, would lengthen the amount of time he may need this assistive device.  However, relative rest but early mobilization is ideal in this setting, so have recommend the patient continue his physical therapy.  They can ultimately decide if they want to send him to one of the spine providers.  The patient's son-in-law is also wondering about home care adjustments given the fact that he lives with them, there are 2 bathrooms on the second floor and 1 bathroom on the first floor.  This is also a discussion that would best be managed by his primary care physician.     In terms of his pelvis the patient is currently doing well.  We will see him again in 4 weeks with new x-rays.  Should continue PT.  Return and ER precautions have been advised.      PMH, Medications and Allergies were reviewed and updated as needed.    ROS:  As noted above otherwise negative.    Patient Active Problem List   Diagnosis    Essential hypertension, benign    Pain in joint, upper arm    Allergic rhinitis    Pain in joint, lower leg    Chronic airway obstruction (H)    Monoclonal paraproteinemia    Immunodeficiency (H24)    Eczema    Transient cerebral ischemia    Bunion    Occipital neuralgia    Hyperlipidemia LDL goal <100    Low back pain    Olecranon bursitis of right elbow    Bruit    Retina hole    signed & scanned on 09/23/2011  (1-4-2013 printed but not scanned in)     Chronic, continuous use of opioids    Atherosclerosis of native coronary artery of native heart without angina pectoris    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, unspecified thyrotoxicosis type    Right-sided low back pain with right-sided sciatica    Coronary artery disease involving native coronary artery of native heart without angina pectoris    BRENNEN (obstructive sleep apnea)    Hammer toe of  right foot    Hallux limitus of right foot    Corn of toe    Non-recurrent unilateral inguinal hernia with obstruction without gangrene    Left inguinal hernia    Metastasis from thyroid cancer (H)    Pulmonary nodules    Preoperative examination    Chronic pain disorder    Dehydration    Shock (H)    CALLUM (acute kidney injury) (H24)    COPD, very severe (H)    Hypokalemia    Hypoxia    Spleen hematoma, initial encounter    Fall, initial encounter    SAH (subarachnoid hemorrhage) (H)    Malignant neoplasm metastatic to lung, unspecified laterality (H)    Hospital-acquired pneumonia    Radiation pneumonitis (H24)    Physical deconditioning    Closed nondisplaced fracture of pelvis, unspecified part of pelvis, initial encounter (H)       Current Outpatient Medications   Medication Sig Dispense Refill    acetaminophen (TYLENOL) 500 MG tablet Take 1,000 mg by mouth 3 times daily      acetylcysteine (MUCOMYST) 20 % neb solution Take 2 mLs by nebulization 4 times daily      albuterol (ACCUNEB) 1.25 MG/3ML neb solution Take 1 vial (1.25 mg) by nebulization every 4 hours as needed for shortness of breath or wheezing 90 mL 4    albuterol (PROAIR HFA/PROVENTIL HFA/VENTOLIN HFA) 108 (90 Base) MCG/ACT inhaler INHALE 2 PUFFS BY MOUTH EVERY 6 HOURS AS NEEDED FOR WHEEZE OR FOR SHORTNESS OF BREATH 18 g 3    alendronate (FOSAMAX) 70 MG tablet Take 1 tablet (70 mg) by mouth every 7 days 12 tablet 3    amLODIPine (NORVASC) 5 MG tablet Take 1 tablet (5 mg) by mouth at bedtime 90 tablet 3    ascorbic acid 1000 MG TABS tablet Take 2,000 mg by mouth daily      aspirin 81 MG EC tablet Take 81 mg by mouth 2 times daily      azithromycin (ZITHROMAX) 500 MG tablet Take 1 tablet (500 mg) by mouth every other day 45 tablet 3    calcium carbonate (OS-KARLIE) 1500 (600 Ca) MG tablet Take 600 mg by mouth daily      carboxymethylcellulose PF (REFRESH PLUS) 0.5 % ophthalmic solution Place 1 drop into both eyes every hour as needed for dry eyes       cholecalciferol 25 MCG (1000 UT) TABS Take 1,000 Units by mouth daily       Fluticasone-Umeclidin-Vilanterol (TRELEGY ELLIPTA) 200-62.5-25 MCG/ACT oral inhaler Inhale 1 puff into the lungs daily In AM. Rinse mouth after use. 180 each 3    furosemide (LASIX) 20 MG tablet Take 1-2 tablets (20-40 mg) by mouth every other day Give 40 mg by mouth  every other day for COPD AND Give 20 mg by mouth very other day HTN, 90 tablet 3    gabapentin (NEURONTIN) 400 MG capsule Take 1 capsule (400 mg) by mouth 3 times daily (Patient taking differently: Take 600 mg by mouth 3 times daily) 270 capsule 3    hydrocortisone 2.5 % cream Apply topically 2 times daily as needed for itching      KLOR-CON 20 MEQ CR tablet TAKE 1 TABLET BY MOUTH 2 TIMES DAILY 180 tablet 3    levothyroxine (SYNTHROID/LEVOTHROID) 125 MCG tablet Take 1 tablet (125 mcg) by mouth daily 90 tablet 3    MegaRed Omega-3 Krill Oil 500 MG CAPS Take 500 mg by mouth daily      Melatonin 10 MG TABS tablet Take 10 mg by mouth nightly as needed      methocarbamol (ROBAXIN) 500 MG tablet Take 1 tablet (500 mg) by mouth 4 times daily 120 tablet 0    metoprolol succinate ER (TOPROL XL) 50 MG 24 hr tablet TAKE 1 TABLET (50 MG) BY MOUTH DAILY (TAKE WITH 25MG TABLET FOR TOTAL 75MG DAILY) 90 tablet 2    multivitamin w/minerals (THERA-VIT-M) tablet Take 1 tablet by mouth daily       nitroGLYcerin (NITROSTAT) 0.4 MG sublingual tablet FOR CHEST PAIN PLACE 1 TAB UNDER TONGUE EVERY 5 MIN FOR 3 DOSES. IF SYMPTOMS PERSIST 5 MIN AFTER 1ST DOSE CALL 911 25 tablet 3    oxyCODONE (ROXICODONE) 5 MG tablet Take 1 tablet (5 mg) by mouth every 6 hours as needed for severe pain 120 tablet 0    predniSONE (DELTASONE) 1 MG tablet Take 2 tablets (2 mg) by mouth daily For COPD 60 tablet 4    rosuvastatin (CRESTOR) 10 MG tablet TAKE 1 TABLET BY MOUTH EVERY DAY (Patient taking differently: Take 10 mg by mouth every evening) 90 tablet 3    senna-docusate (SENOKOT-S/PERICOLACE) 8.6-50 MG tablet Take 1  "tablet by mouth 2 times daily Hold for loose stools      traZODone (DESYREL) 50 MG tablet Take 1 tablet (50 mg) by mouth at bedtime 30 tablet 0            OBJECTIVE:       Vitals:   Vitals:    07/18/24 0919   Weight: 73 kg (161 lb)   Height: 1.778 m (5' 10\")     BMI: Body mass index is 23.1 kg/m .    Gen:  Well nourished and in no acute distress  HEENT: Extraocular movement intact  Neck: Supple  Pulm:  Breathing Comfortably. No increased respiratory effort.  Psych: Euthymic. Appropriately answers questions    MSK: Limited examination.  Nonantalgic gait, with the use of the cane.  No palpable tenderness about the pelvis, ASIS or AIIS.  Full range of motion of the left lower extremity, at the hip and knee.  No internal or external rotation deficits.  Negative logroll and FADIR.      Study Result    Narrative & Impression   1 views pelvis radiograph(s) 6/20/2024 10:11 AM     History: Closed fracture of left inferior pubic ramus, initial  encounter (H)     Comparison: 5/23/2024      Findings:     AP view(s) of the pelvis were obtained.      Stable appearance of the nondisplaced inferior left pubic ramus with  progressive bony callus formation. No new fracture.     Total right hip arthroplasty. Left hip hemiarthroplasty. Stable  alignment of the arthroplasty components without evidence of  complication. Similar mild periostitis along the proximal lateral  right femoral shaft. Small amount of heterotopic bone formation about  both hips, similar to prior.     Sacrum and innominate bones are partially obscured by overlying bowel  gas/fecal content.     Extensive vascular calcifications. Surgical clips overlie the scrotum.                                                                      Impression:  Stable appearance of the nondisplaced inferior left pubic ramus  fracture with suggestion of ongoing healing.     I have personally reviewed the examination and initial interpretation  and I agree with the findings.   "   RONALD NYE MD (Joe)              ASSESSMENT and PLAN:     Harrison was seen today for recheck.    Diagnoses and all orders for this visit:    Closed fracture of left inferior pubic ramus, initial encounter (H)      Harrison is a 78-year-old very nice gentleman presenting to clinic at the 12-week follow-up for the inferior pubic rami fracture, nondisplaced.  He is doing well.  This fracture is in the setting of low bone mineral density, and his management is being handled by his primary care for that particular issue.  In terms of the fracture, x-rays were obtained today which show stability in the alignment, and progressive healing.  I have discussed this at length with Harrison.  Clinically he is also doing very well.  Patient can continue to use his assistive device as needed.  He can also continue physical therapy until discharge.  All questions have been answered.  He should follow-up with the spine team for the back concerns, continue his management for osteoporosis with his primary care provider.  He can follow-up in our clinic as needed.    Options for treatment and/or follow-up care were reviewed with the patient was actively involved in the decision making process. Patient verbalized understanding and was in agreement with the plan.    Emory Garcia DO  , Sports Medicine  Department of Family Medicine and [facility]

## 2024-07-18 ENCOUNTER — OFFICE VISIT (OUTPATIENT)
Dept: ORTHOPEDICS | Facility: CLINIC | Age: 79
End: 2024-07-18
Payer: MEDICARE

## 2024-07-18 ENCOUNTER — ANCILLARY PROCEDURE (OUTPATIENT)
Dept: GENERAL RADIOLOGY | Facility: CLINIC | Age: 79
End: 2024-07-18
Attending: STUDENT IN AN ORGANIZED HEALTH CARE EDUCATION/TRAINING PROGRAM
Payer: MEDICARE

## 2024-07-18 VITALS — HEIGHT: 70 IN | WEIGHT: 161 LBS | BODY MASS INDEX: 23.05 KG/M2

## 2024-07-18 DIAGNOSIS — S32.592A CLOSED FRACTURE OF LEFT INFERIOR PUBIC RAMUS, INITIAL ENCOUNTER (H): Primary | ICD-10-CM

## 2024-07-18 DIAGNOSIS — S32.592A CLOSED FRACTURE OF LEFT INFERIOR PUBIC RAMUS, INITIAL ENCOUNTER (H): ICD-10-CM

## 2024-07-18 PROCEDURE — 73502 X-RAY EXAM HIP UNI 2-3 VIEWS: CPT | Mod: GC | Performed by: RADIOLOGY

## 2024-07-18 PROCEDURE — 99213 OFFICE O/P EST LOW 20 MIN: CPT | Performed by: STUDENT IN AN ORGANIZED HEALTH CARE EDUCATION/TRAINING PROGRAM

## 2024-07-18 RX ORDER — METOPROLOL SUCCINATE 50 MG/1
TABLET, EXTENDED RELEASE ORAL
Qty: 90 TABLET | Refills: 2 | Status: SHIPPED | OUTPATIENT
Start: 2024-07-18

## 2024-07-18 NOTE — TELEPHONE ENCOUNTER
Called and left message for the patient informing medication has been sent to the pharmacy. Advised patient to call the clinic back with any additional questions and/or concerns.     Analy Gusman RN

## 2024-07-18 NOTE — TELEPHONE ENCOUNTER
High priority per patient.     Medication Question or Refill    Contacts       Contact Date/Time Type Contact Phone/Fax    07/17/2024 05:58 PM CDT Interface (Incoming) [id number] - [place], [place] - [address] [place] [phone]    07/18/2024 08:12 AM CDT Phone (Incoming) Harrison Thomas (Self) [phone] (M)            What medication are you calling about (include dose and sig)?: metoprolol 50 mg     Preferred Pharmacy:    [id number] - [place] - [address] [facility]  [address] [facility] [address]  Phone: [phone] Fax: [phone]      Controlled Substance Agreement on file:   CSA -- Patient Level:     [Media Unavailable] Controlled Substance Agreement - Opioid - Scan on 4/22/2024 12:02 PM   [Media Unavailable] Controlled Substance Agreement - Opioid - Scan on 3/22/2023  2:37 PM   [Media Unavailable] Controlled Substance Agreement - Opioid - Scan on 3/22/2023  2:37 PM   [Media Unavailable] Controlled Substance Agreement - Opioid - Scan on 3/22/2023  2:37 PM   [Media Unavailable] Controlled Substance Agreement - Opioid - Scan on 3/22/2023  2:37 PM   [Media Unavailable] Controlled Substance Agreement - Opioid - Scan on 3/22/2023  2:37 PM   [Media Unavailable] Controlled Substance Agreement - Opioid - Scan on 11/1/2021  1:44 PM: 11/1/21       Who prescribed the medication?: Pt states it's Dr. Dozier that prescribed medication. Medication List states another provider.     Do you need a refill? Yes    When did you use the medication last? Completely out for days now.     Patient offered an appointment? No    Do you have any questions or concerns?  No      Could we send this information to you in [facility] or would you prefer to receive a phone call?:   Patient would prefer a phone call   Okay to leave a detailed message?: Yes at Home number on file [phone] (home)

## 2024-07-18 NOTE — LETTER
7/18/2024      RE: Harrison Thomas  [id number] [facility] [address]     Dear Colleague,    Thank you for referring your patient, Harrison Thomas, to the [facility] [place]. Please see a copy of my visit note below.    [facility]  Sports Medicine Clinic  [facility]           SUBJECTIVE       Harrison Thomas is a 78 year old male presenting to clinic today fo a follow-up of the left hip/pelvis fx. He is currently at the 12 week katerin for follow-up. .  Harrison is overall doing very well.  He is using a walker and his cane, without any pain in the pelvis.  He is also scheduled to see a spine provider given some of the back issues.  He is not having any weakness beyond baseline.  No numbness and tingling.  No new falls.    6/20/24: Closed fracture of left inferior pubic ramus, initial encounter (H)     Injury of left hip, subsequent encounter     Closed fracture of sacrum, unspecified portion of sacrum, initial encounter (H)        Harrison is a 78-year-old very pleasant male, following up at week 8 out of 12 of the inferior pubic rami fractures.  Patient is overall doing well from that standpoint.   The patient's PCP did order an MRI of his lumbar spine which was briefly discussed with the patient today, which did show evidence of compression fractures as well as osteoarthrosis, as well as a left-sided sacral alar fracture.  The patient does have a longstanding history of low bone mineral density as well, with discussions of starting Prolia for that by his PCP.  In terms of the sacral alar fracture, without the patient having any neurologic deficits, given the stability of his pelvic fractures, he is currently comanaging those with the physical therapy and assistive device.  I did discuss with the patient sending him to one of our spine providers given the osteoarthrosis as well as the compression fractures, but given the fact the patient is  being managed for this by his PCP, it is most prudent for him to follow-up with his primary care physician to discuss these findings as well as alteration/additions/changes to his bone mineral medications.  In the setting of the sacral alar fracture, I do think it would be most ideal for the patient to remain on the 4 wheeled walker, as the healing time for that can be roughly the same as the pelvic fractures, and in the setting of his osteoporosis, would lengthen the amount of time he may need this assistive device.  However, relative rest but early mobilization is ideal in this setting, so have recommend the patient continue his physical therapy.  They can ultimately decide if they want to send him to one of the spine providers.  The patient's son-in-law is also wondering about home care adjustments given the fact that he lives with them, there are 2 bathrooms on the second floor and 1 bathroom on the first floor.  This is also a discussion that would best be managed by his primary care physician.     In terms of his pelvis the patient is currently doing well.  We will see him again in 4 weeks with new x-rays.  Should continue PT.  Return and ER precautions have been advised.      PMH, Medications and Allergies were reviewed and updated as needed.    ROS:  As noted above otherwise negative.    Patient Active Problem List   Diagnosis     Essential hypertension, benign     Pain in joint, upper arm     Allergic rhinitis     Pain in joint, lower leg     Chronic airway obstruction (H)     Monoclonal paraproteinemia     Immunodeficiency (H24)     Eczema     Transient cerebral ischemia     Bunion     Occipital neuralgia     Hyperlipidemia LDL goal <100     Low back pain     Olecranon bursitis of right elbow     Bruit     Retina hole     signed & scanned on 09/23/2011  (1-4-2013 printed but not scanned in)      Chronic, continuous use of opioids     Atherosclerosis of native coronary artery of native heart without angina  pectoris     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, unspecified thyrotoxicosis type     Right-sided low back pain with right-sided sciatica     Coronary artery disease involving native coronary artery of native heart without angina pectoris     BRENNEN (obstructive sleep apnea)     Hammer toe of right foot     Hallux limitus of right foot     Corn of toe     Non-recurrent unilateral inguinal hernia with obstruction without gangrene     Left inguinal hernia     Metastasis from thyroid cancer (H)     Pulmonary nodules     Preoperative examination     Chronic pain disorder     Dehydration     Shock (H)     CALLUM (acute kidney injury) (H24)     COPD, very severe (H)     Hypokalemia     Hypoxia     Spleen hematoma, initial encounter     Fall, initial encounter     SAH (subarachnoid hemorrhage) (H)     Malignant neoplasm metastatic to lung, unspecified laterality (H)     Hospital-acquired pneumonia     Radiation pneumonitis (H24)     Physical deconditioning     Closed nondisplaced fracture of pelvis, unspecified part of pelvis, initial encounter (H)       Current Outpatient Medications   Medication Sig Dispense Refill     acetaminophen (TYLENOL) 500 MG tablet Take 1,000 mg by mouth 3 times daily       acetylcysteine (MUCOMYST) 20 % neb solution Take 2 mLs by nebulization 4 times daily       albuterol (ACCUNEB) 1.25 MG/3ML neb solution Take 1 vial (1.25 mg) by nebulization every 4 hours as needed for shortness of breath or wheezing 90 mL 4     albuterol (PROAIR HFA/PROVENTIL HFA/VENTOLIN HFA) 108 (90 Base) MCG/ACT inhaler INHALE 2 PUFFS BY MOUTH EVERY 6 HOURS AS NEEDED FOR WHEEZE OR FOR SHORTNESS OF BREATH 18 g 3     alendronate (FOSAMAX) 70 MG tablet Take 1 tablet (70 mg) by mouth every 7 days 12 tablet 3     amLODIPine (NORVASC) 5 MG tablet Take 1 tablet (5 mg) by mouth at bedtime 90 tablet 3     ascorbic acid 1000 MG TABS tablet Take 2,000 mg by mouth daily       aspirin 81 MG EC tablet Take 81 mg by mouth 2 times daily        azithromycin (ZITHROMAX) 500 MG tablet Take 1 tablet (500 mg) by mouth every other day 45 tablet 3     calcium carbonate (OS-KARLIE) 1500 (600 Ca) MG tablet Take 600 mg by mouth daily       carboxymethylcellulose PF (REFRESH PLUS) 0.5 % ophthalmic solution Place 1 drop into both eyes every hour as needed for dry eyes       cholecalciferol 25 MCG (1000 UT) TABS Take 1,000 Units by mouth daily        Fluticasone-Umeclidin-Vilanterol (TRELEGY ELLIPTA) 200-62.5-25 MCG/ACT oral inhaler Inhale 1 puff into the lungs daily In AM. Rinse mouth after use. 180 each 3     furosemide (LASIX) 20 MG tablet Take 1-2 tablets (20-40 mg) by mouth every other day Give 40 mg by mouth  every other day for COPD AND Give 20 mg by mouth very other day HTN, 90 tablet 3     gabapentin (NEURONTIN) 400 MG capsule Take 1 capsule (400 mg) by mouth 3 times daily (Patient taking differently: Take 600 mg by mouth 3 times daily) 270 capsule 3     hydrocortisone 2.5 % cream Apply topically 2 times daily as needed for itching       KLOR-CON 20 MEQ CR tablet TAKE 1 TABLET BY MOUTH 2 TIMES DAILY 180 tablet 3     levothyroxine (SYNTHROID/LEVOTHROID) 125 MCG tablet Take 1 tablet (125 mcg) by mouth daily 90 tablet 3     MegaRed Omega-3 Krill Oil 500 MG CAPS Take 500 mg by mouth daily       Melatonin 10 MG TABS tablet Take 10 mg by mouth nightly as needed       methocarbamol (ROBAXIN) 500 MG tablet Take 1 tablet (500 mg) by mouth 4 times daily 120 tablet 0     metoprolol succinate ER (TOPROL XL) 50 MG 24 hr tablet TAKE 1 TABLET (50 MG) BY MOUTH DAILY (TAKE WITH 25MG TABLET FOR TOTAL 75MG DAILY) 90 tablet 2     multivitamin w/minerals (THERA-VIT-M) tablet Take 1 tablet by mouth daily        nitroGLYcerin (NITROSTAT) 0.4 MG sublingual tablet FOR CHEST PAIN PLACE 1 TAB UNDER TONGUE EVERY 5 MIN FOR 3 DOSES. IF SYMPTOMS PERSIST 5 MIN AFTER 1ST DOSE CALL 911 25 tablet 3     oxyCODONE (ROXICODONE) 5 MG tablet Take 1 tablet (5 mg) by mouth every 6 hours as needed for  "severe pain 120 tablet 0     predniSONE (DELTASONE) 1 MG tablet Take 2 tablets (2 mg) by mouth daily For COPD 60 tablet 4     rosuvastatin (CRESTOR) 10 MG tablet TAKE 1 TABLET BY MOUTH EVERY DAY (Patient taking differently: Take 10 mg by mouth every evening) 90 tablet 3     senna-docusate (SENOKOT-S/PERICOLACE) 8.6-50 MG tablet Take 1 tablet by mouth 2 times daily Hold for loose stools       traZODone (DESYREL) 50 MG tablet Take 1 tablet (50 mg) by mouth at bedtime 30 tablet 0            OBJECTIVE:       Vitals:   Vitals:    07/18/24 0919   Weight: 73 kg (161 lb)   Height: 1.778 m (5' 10\")     BMI: Body mass index is 23.1 kg/m .    Gen:  Well nourished and in no acute distress  HEENT: Extraocular movement intact  Neck: Supple  Pulm:  Breathing Comfortably. No increased respiratory effort.  Psych: Euthymic. Appropriately answers questions    MSK: Limited examination.  Nonantalgic gait, with the use of the cane.  No palpable tenderness about the pelvis, ASIS or AIIS.  Full range of motion of the left lower extremity, at the hip and knee.  No internal or external rotation deficits.  Negative logroll and FADIR.      Study Result    Narrative & Impression   1 views pelvis radiograph(s) 6/20/2024 10:11 AM     History: Closed fracture of left inferior pubic ramus, initial  encounter (H)     Comparison: 5/23/2024      Findings:     AP view(s) of the pelvis were obtained.      Stable appearance of the nondisplaced inferior left pubic ramus with  progressive bony callus formation. No new fracture.     Total right hip arthroplasty. Left hip hemiarthroplasty. Stable  alignment of the arthroplasty components without evidence of  complication. Similar mild periostitis along the proximal lateral  right femoral shaft. Small amount of heterotopic bone formation about  both hips, similar to prior.     Sacrum and innominate bones are partially obscured by overlying bowel  gas/fecal content.     Extensive vascular calcifications. " Surgical clips overlie the scrotum.                                                                      Impression:  Stable appearance of the nondisplaced inferior left pubic ramus  fracture with suggestion of ongoing healing.     I have personally reviewed the examination and initial interpretation  and I agree with the findings.     RONALD NYE MD (Joe)              ASSESSMENT and PLAN:     Harrison was seen today for recheck.    Diagnoses and all orders for this visit:    Closed fracture of left inferior pubic ramus, initial encounter (H)      Harrison is a 78-year-old very nice gentleman presenting to clinic at the 12-week follow-up for the inferior pubic rami fracture, nondisplaced.  He is doing well.  This fracture is in the setting of low bone mineral density, and his management is being handled by his primary care for that particular issue.  In terms of the fracture, x-rays were obtained today which show stability in the alignment, and progressive healing.  I have discussed this at length with Harrison.  Clinically he is also doing very well.  Patient can continue to use his assistive device as needed.  He can also continue physical therapy until discharge.  All questions have been answered.  He should follow-up with the spine team for the back concerns, continue his management for osteoporosis with his primary care provider.  He can follow-up in our clinic as needed.    Options for treatment and/or follow-up care were reviewed with the patient was actively involved in the decision making process. Patient verbalized understanding and was in agreement with the plan.    Emory Garcia DO  , Sports Medicine  Department of Family Medicine and [facility]      Again, thank you for allowing me to participate in the care of your patient.      Sincerely,    Emory Garcia DO

## 2024-07-25 ENCOUNTER — TELEPHONE (OUTPATIENT)
Dept: FAMILY MEDICINE | Facility: CLINIC | Age: 79
End: 2024-07-25
Payer: MEDICARE

## 2024-07-25 NOTE — TELEPHONE ENCOUNTER
Home Care is calling regarding an established patient with [facility].        1/19/2024     1:54 PM   Home Care Information   Date of Home Care episode start 1/22/2024   Current following provider Dr. Dozier   Date provider agreed to follow 1/22/2024    Name/Phone Number Isaac MCCLOUD,[phone]   Home Care agency [facility]     Requesting orders from: Yaneli Dozier  Provider is following patient: Yes  Is this a 60-day recertification request?  No    Orders Requested    Physical Therapy  Request for initial certification (first set of orders)   Frequency:  1x/wk for 2 wks 1 visit every other week for 2wks    Information was gathered and will be sent to provider for review.  RN will contact Home Care with information after provider review.  Confirmed ok to leave a detailed message with call back.  Contact information confirmed and updated as needed.    Celina Tolbert RN

## 2024-07-26 ENCOUNTER — VIRTUAL VISIT (OUTPATIENT)
Dept: FAMILY MEDICINE | Facility: CLINIC | Age: 79
End: 2024-07-26
Payer: MEDICARE

## 2024-07-26 DIAGNOSIS — S32.000G LUMBAR COMPRESSION FRACTURE, WITH DELAYED HEALING, SUBSEQUENT ENCOUNTER: ICD-10-CM

## 2024-07-26 DIAGNOSIS — R29.6 MULTIPLE FALLS: ICD-10-CM

## 2024-07-26 DIAGNOSIS — Z79.83 LONG TERM (CURRENT) USE OF BISPHOSPHONATES: ICD-10-CM

## 2024-07-26 DIAGNOSIS — S32.592G: Primary | ICD-10-CM

## 2024-07-26 DIAGNOSIS — J44.9 COPD, VERY SEVERE (H): ICD-10-CM

## 2024-07-26 DIAGNOSIS — Z92.29 PERSONAL HISTORY OF OTHER DRUG THERAPY: ICD-10-CM

## 2024-07-26 DIAGNOSIS — F11.90 CHRONIC, CONTINUOUS USE OF OPIOIDS: ICD-10-CM

## 2024-07-26 DIAGNOSIS — Z79.52 LONG TERM (CURRENT) USE OF SYSTEMIC STEROIDS: ICD-10-CM

## 2024-07-26 DIAGNOSIS — G20.A1 PARKINSON'S DISEASE WITHOUT DYSKINESIA OR FLUCTUATING MANIFESTATIONS (H): ICD-10-CM

## 2024-07-26 DIAGNOSIS — Z79.51 LONG TERM (CURRENT) USE OF INHALED STEROIDS: ICD-10-CM

## 2024-07-26 PROCEDURE — 99214 OFFICE O/P EST MOD 30 MIN: CPT | Mod: 95 | Performed by: INTERNAL MEDICINE

## 2024-07-26 PROCEDURE — G2211 COMPLEX E/M VISIT ADD ON: HCPCS | Mod: 95 | Performed by: INTERNAL MEDICINE

## 2024-07-26 RX ORDER — PREDNISONE 5 MG/1
5 TABLET ORAL DAILY
Qty: 31 TABLET | Refills: 1 | Status: SHIPPED | OUTPATIENT
Start: 2024-07-26 | End: 2024-09-23

## 2024-07-26 ASSESSMENT — ENCOUNTER SYMPTOMS: BACK PAIN: 1

## 2024-07-26 NOTE — PATIENT INSTRUCTIONS
You have selected the below site for your Prolia injection. If you did not schedule this appointment in clinic, please call the number listed below.  Ros DAILEY) [phone]

## 2024-07-26 NOTE — TELEPHONE ENCOUNTER
Called SANCHEZ Carvajal regarding PCP message below. Left a detailed message on his confidential VM per ok to leave message on secure line. Left clinic number to call back with questions/concerns.

## 2024-07-26 NOTE — PROGRESS NOTES
Harrison is a 78 year old who is being evaluated via a billable video visit.    How would you like to obtain your AVS? MyChart  If the video visit is dropped, the invitation should be resent by: Text to cell phone: [phone]  Will anyone else be joining your video visit? No      Assessment & Plan     Inferior pubic ramus fracture, left, with delayed healing, subsequent encounter  This is extremely complex patient with multiple fractures although his bone density scan last year does not reveal low bone density because of arthritis  He has another inferior pubic ramus fracture  He is currently living with his daughter but should be in nursing home or assisted living  Palliative care should also be considered  We will involve  because he does not have financial support because he says that his wife has frozen the assets and is considering divorce  He is living with his daughter where he lives on the first floor but bathroom is on the next floor and that is risky for him  - Primary Care - Care Coordination Referral    Lumbar compression fracture, with delayed healing, subsequent encounter  As above  - Primary Care - Care Coordination Referral    COPD, very severe (H)  We will change prednisone to 5 mg daily and continue all other maximum medical management  - predniSONE (DELTASONE) 5 MG tablet  Dispense: 31 tablet; Refill: 1  - Primary Care - Care Coordination Referral    Chronic, continuous use of opioids  Patient asked if oxycodone can be used but I think it would increase the risk of his falls  - Primary Care - Care Coordination Referral    Multiple falls  Patient is on home physical therapy    Parkinson's disease without dyskinesia or fluctuating manifestations (H)  Patient has been having tremors and is falling frequently therefore he should see neurology  - Adult Neurology  Referral  - Primary Care - Care Coordination Referral    Long term (current) use of inhaled steroids  We should do her  DEXA scan    Long term (current) use of systemic steroids  We should do her DEXA scan and consider Prolia because he is on Fosamax  - DX Bone Density    Long term (current) use of bisphosphonates  Fosamax has been ineffective in preventing fractures we should look at Prolia  - DX Bone Density    Personal history of other drug therapy  - Primary Care - Care Coordination Referral  - DX Bone Density        Longitudinal plan of care was discussed with this patient about complex care of her condition during the visit today.I will continue to follow up and support her in the care of this complex condition.I will also follow up as needed by phone or my chart.          Artur Terry is a 78 year old, presenting for the following health issues:  Back Pain      Video Start Time: 3.34  Patient is complex and has longstanding history of severe COPD, immunocompromise, history of lung mass which was thyroid cancer, multiple pneumonias and multiple admissions with multiple fractures  He fell and has left inferior pubic ramus fracture at this time which is being treated conservatively  He has been on Fosamax over 1 year  He also had a DEXA scan last year  He is in severe pain and he is on gabapentin  He is living with his daughter and his financial situation is quite poor because he is going through divorce at present  He says that court has frozen his assets  He is on oxygen and has become more short of breath    History of Present Illness       Back Pain:  He presents for follow up of back pain. Patient's back pain is a chronic problem.  Location of back pain:  Right lower back and right buttock  Description of back pain: burning, dull ache and stabbing  Back pain spreads: right buttocks    Since patient first noticed back pain, pain is: gradually worsening  Does back pain interfere with his job:  Not applicable       COPD:  He presents for follow up of COPD.   Overall, COPD symptoms are slightly worse since last visit. He  has more than usual fatigue or shortness of breath with exertion and same as usual shortness of breath at rest.  He sometimes coughs and does have change in sputum. No recent fever. He can walk the length of 1-2 rooms without stopping to rest. He can not walk a flight of stairs without resting. The patient has had no ED, urgent care, or hospital admissions because of COPD since the last visit.     He eats 2-3 servings of fruits and vegetables daily.He consumes 0 sweetened beverage(s) daily.He exercises with enough effort to increase his heart rate 9 or less minutes per day.  He exercises with enough effort to increase his heart rate 3 or less days per week.   He is taking medications regularly.       COPD Follow-Up  Overall, how are your COPD symptoms since your last clinic visit?  No change  How much fatigue or shortness of breath do you have when you are walking?  None  How much shortness of breath do you have when you are resting?  More than usual  How often do you cough? Sometimes  Have you noticed any change in your sputum/phlegm?  No  Have you experienced a recent fever? No  Please describe how far you can walk without stopping to rest:  Less than 10 feet  How many flights of stairs are you able to walk up without stopping?  None  Have you had any Emergency Room Visits, Urgent Care Visits, or Hospital Admissions because of your COPD since your last office visit?  No    History   Smoking Status     Former     Types: Cigarettes   Smokeless Tobacco     Never         Review of Systems  Constitutional, HEENT, cardiovascular, pulmonary, GI, , musculoskeletal, neuro, skin, endocrine and psych systems are negative, except as otherwise noted.      Objective           Vitals:  No vitals were obtained today due to virtual visit.    Physical Exam   GENERAL: alert and no distress  EYES: Eyes grossly normal to inspection.  No discharge or erythema, or obvious scleral/conjunctival abnormalities.  RESP: No audible wheeze,  cough, or visible cyanosis.    SKIN: Visible skin clear. No significant rash, abnormal pigmentation or lesions.  NEURO: Cranial nerves grossly intact.  Mentation and speech appropriate for age.  PSYCH: Appropriate affect, tone, and pace of words    Patient Active Problem List   Diagnosis     Essential hypertension, benign     Pain in joint, upper arm     Allergic rhinitis     Pain in joint, lower leg     Chronic airway obstruction (H)     Monoclonal paraproteinemia     Immunodeficiency (H24)     Eczema     Transient cerebral ischemia     Bunion     Occipital neuralgia     Hyperlipidemia LDL goal <100     Low back pain     Olecranon bursitis of right elbow     Bruit     Retina hole     signed & scanned on 09/23/2011  (1-4-2013 printed but not scanned in)      Chronic, continuous use of opioids     Atherosclerosis of native coronary artery of native heart without angina pectoris     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, unspecified thyrotoxicosis type     Right-sided low back pain with right-sided sciatica     Coronary artery disease involving native coronary artery of native heart without angina pectoris     BRENNEN (obstructive sleep apnea)     Hammer toe of right foot     Hallux limitus of right foot     Corn of toe     Non-recurrent unilateral inguinal hernia with obstruction without gangrene     Left inguinal hernia     Metastasis from thyroid cancer (H)     Pulmonary nodules     Preoperative examination     Chronic pain disorder     Dehydration     Shock (H)     CALLUM (acute kidney injury) (H24)     COPD, very severe (H)     Hypokalemia     Hypoxia     Spleen hematoma, initial encounter     Fall, initial encounter     SAH (subarachnoid hemorrhage) (H)     Malignant neoplasm metastatic to lung, unspecified laterality (H)     Hospital-acquired pneumonia     Radiation pneumonitis (H24)     Physical deconditioning     Closed nondisplaced fracture of pelvis, unspecified part of pelvis, initial encounter (H)     Lumbar  compression fracture, with delayed healing, subsequent encounter     Current Outpatient Medications   Medication Sig Dispense Refill     acetaminophen (TYLENOL) 500 MG tablet Take 1,000 mg by mouth 3 times daily       acetylcysteine (MUCOMYST) 20 % neb solution Take 2 mLs by nebulization 4 times daily       albuterol (ACCUNEB) 1.25 MG/3ML neb solution Take 1 vial (1.25 mg) by nebulization every 4 hours as needed for shortness of breath or wheezing 90 mL 4     albuterol (PROAIR HFA/PROVENTIL HFA/VENTOLIN HFA) 108 (90 Base) MCG/ACT inhaler INHALE 2 PUFFS BY MOUTH EVERY 6 HOURS AS NEEDED FOR WHEEZE OR FOR SHORTNESS OF BREATH 18 g 3     alendronate (FOSAMAX) 70 MG tablet Take 1 tablet (70 mg) by mouth every 7 days 12 tablet 3     amLODIPine (NORVASC) 5 MG tablet Take 1 tablet (5 mg) by mouth at bedtime 90 tablet 3     ascorbic acid 1000 MG TABS tablet Take 2,000 mg by mouth daily       aspirin 81 MG EC tablet Take 81 mg by mouth 2 times daily       azithromycin (ZITHROMAX) 500 MG tablet Take 1 tablet (500 mg) by mouth every other day 45 tablet 3     calcium carbonate (OS-KARLIE) 1500 (600 Ca) MG tablet Take 600 mg by mouth daily       carboxymethylcellulose PF (REFRESH PLUS) 0.5 % ophthalmic solution Place 1 drop into both eyes every hour as needed for dry eyes       cholecalciferol 25 MCG (1000 UT) TABS Take 1,000 Units by mouth daily        Fluticasone-Umeclidin-Vilanterol (TRELEGY ELLIPTA) 200-62.5-25 MCG/ACT oral inhaler Inhale 1 puff into the lungs daily In AM. Rinse mouth after use. 180 each 3     furosemide (LASIX) 20 MG tablet Take 1-2 tablets (20-40 mg) by mouth every other day Give 40 mg by mouth  every other day for COPD AND Give 20 mg by mouth very other day HTN, 90 tablet 3     gabapentin (NEURONTIN) 400 MG capsule Take 1 capsule (400 mg) by mouth 3 times daily (Patient taking differently: Take 600 mg by mouth 3 times daily) 270 capsule 3     hydrocortisone 2.5 % cream Apply topically 2 times daily as needed  for itching       KLOR-CON 20 MEQ CR tablet TAKE 1 TABLET BY MOUTH 2 TIMES DAILY 180 tablet 3     levothyroxine (SYNTHROID/LEVOTHROID) 125 MCG tablet Take 1 tablet (125 mcg) by mouth daily 90 tablet 3     MegaRed Omega-3 Krill Oil 500 MG CAPS Take 500 mg by mouth daily       Melatonin 10 MG TABS tablet Take 10 mg by mouth nightly as needed       methocarbamol (ROBAXIN) 500 MG tablet Take 1 tablet (500 mg) by mouth 4 times daily 120 tablet 0     metoprolol succinate ER (TOPROL XL) 50 MG 24 hr tablet TAKE 1 TABLET (50 MG) BY MOUTH DAILY (TAKE WITH 25MG TABLET FOR TOTAL 75MG DAILY) 90 tablet 2     multivitamin w/minerals (THERA-VIT-M) tablet Take 1 tablet by mouth daily        oxyCODONE (ROXICODONE) 5 MG tablet Take 1 tablet (5 mg) by mouth every 6 hours as needed for severe pain 120 tablet 0     predniSONE (DELTASONE) 5 MG tablet Take 1 tablet (5 mg) by mouth daily For COPD 31 tablet 1     rosuvastatin (CRESTOR) 10 MG tablet TAKE 1 TABLET BY MOUTH EVERY DAY (Patient taking differently: Take 10 mg by mouth every evening) 90 tablet 3     senna-docusate (SENOKOT-S/PERICOLACE) 8.6-50 MG tablet Take 1 tablet by mouth 2 times daily Hold for loose stools       traZODone (DESYREL) 50 MG tablet Take 1 tablet (50 mg) by mouth at bedtime 30 tablet 0     nitroGLYcerin (NITROSTAT) 0.4 MG sublingual tablet FOR CHEST PAIN PLACE 1 TAB UNDER TONGUE EVERY 5 MIN FOR 3 DOSES. IF SYMPTOMS PERSIST 5 MIN AFTER 1ST DOSE CALL 911 25 tablet 3     Current Facility-Administered Medications   Medication Dose Route Frequency Provider Last Rate Last Admin     [START ON 8/9/2024] denosumab (PROLIA) injection 60 mg  60 mg Subcutaneous Q6 Months                Video-Visit Details    Type of service:  Video Visit   Patient could not join video due to his network issues, he remained on audio  Video End Time:3.50  Originating Location (pt. Location): Home      Platform used for Video Visit: Doximity  Signed Electronically by: Yaneli Dozier MD    The  following steps were completed to comply with the REMS program for Prolia:  Reviewed the serious risks of Prolia  and the symptoms of each risk.  Advised patient to seek prompt medical attention if they have signs or symptoms of any of the serious risks.  Patient will be provided a copy of the Medication Guide and Patient Brochure prior to first injection.    Yaneli Dozier MD

## 2024-07-29 ENCOUNTER — PATIENT OUTREACH (OUTPATIENT)
Dept: CARE COORDINATION | Facility: CLINIC | Age: 79
End: 2024-07-29
Payer: MEDICARE

## 2024-07-29 NOTE — PROGRESS NOTES
Clinic Care Coordination Contact  [facility]/Voicemail    Clinical Data: Care Coordinator Outreach    Outreach Documentation Number of Outreach Attempt   7/29/2024  12:34 PM 1       Left message on patient's voicemail with call back information and requested return call.    Plan: Care Coordinator will try to reach patient again in 1-2 business days.    KEYLA Cristobal  Clinic Care Coordination  [facility] Clinics: [facility], [facility], [facility], and [facility]  Phone: [phone]

## 2024-07-29 NOTE — TELEPHONE ENCOUNTER
Spoke to pt to confirm that they had submitted their enrollment form for [facility]. Signed trelegy rx has been faxed to [facility] at [phone]

## 2024-07-29 NOTE — PROGRESS NOTES
"Clinic Care Coordination Contact  Community Health Worker Initial Outreach    Patient Called Back.    Patient stated he is in the process of getting a divorce and all the money is \"tied\" up right now.  Patient stated he would like to move into an assisted living facility.  Patient stated he is living at his daughter's house.  Patient stated he would like help with medication costs.    Reason for Referral:  Care Transition  Financial Support  TCU discharge- needs assisted living  Housing  Medication Affordability      CHW Initial Information Gathering:  Referral Source: PCP  Preferred Hospital: [facility]  [phone]  Current living arrangement:: I live in a private home with family (Lives with daughter at her home)  Type of residence:: Private home - stairs  Community Resources: None  Supplies Currently Used at Home: Oxygen Tubing/Supplies  Equipment Currently Used at Home: walker, standard  Informal Support system:: Children, Family  No PCP office visit in Past Year: No  Transportation means:: Family, Regular car  CHW Additional Questions  If ED/Hospital discharge, follow-up appointment scheduled as recommended?: N/A  Medication changes made following ED/Hospital discharge?: N/A  MyChart active?: Yes  Patient sent Social Determinants of Health questionnaire?: Yes    Patient accepts CC: Yes. Patient scheduled for assessment with KAYLEEN Montague on 7/31/24 at 11:00am. Patient noted desire to discuss patient's current situation, assisted living facilities, medication affordability, and CC support.     KEYLA Cristobal  Clinic Care Coordination  [facility] Clinics: [facility], [facility], [facility], and [facility]  Phone: [phone]     "

## 2024-07-31 ENCOUNTER — PATIENT OUTREACH (OUTPATIENT)
Dept: NURSING | Facility: CLINIC | Age: 79
End: 2024-07-31
Payer: MEDICARE

## 2024-07-31 ASSESSMENT — ACTIVITIES OF DAILY LIVING (ADL): DEPENDENT_IADLS:: CLEANING;COOKING;LAUNDRY;MEAL PREPARATION

## 2024-07-31 NOTE — LETTER
August 1, 2024      Harrison Thomas  [address] [address] [address]        To Whom It May Concern,       Patient needs to move to assisted living. It is medically necessary. He needs some funds released from his frozen assets to help with that.  He is dealing with multiple fractures and severe COPD.      Sincerely,

## 2024-07-31 NOTE — LETTER
[facility]  Patient Centered Plan of Care  About Me:        Patient Name:  Harrison Thomas    YOB: 1945  Age:         78 year old   Candice MRN:    [id number] Telephone Information:  Home Phone [phone]   Mobile [phone]       Address:  [address] [address] [address] Email address:  [email or web address]      Emergency Contact(s)    Name Relationship Lgl Grd Work Phone Home Phone Mobile Phone   1. HERIBERTO HUANG* Spouse No   [phone]   2. KEILA HUANG* Daughter No   [phone]   3. GERONIMO Sister No              Primary language:  English     needed? No   [facility]:  [phone] op. 1  Other communication barriers:None    Preferred Method of Communication:  Mail  Current living arrangement: I live in a private home with family (Lives with daughter at her home)    Mobility Status/ Medical Equipment: Independent w/Device        Health Maintenance  Health Maintenance Reviewed: Due/Overdue   Health Maintenance Due   Topic Date Due    COPD ACTION PLAN  07/28/2018    PSA  12/03/2023    COVID-19 Vaccine (8 - 2023-24 season) 12/15/2023           My Access Plan  Medical Emergency 911   Primary Clinic Line [facility] [phone]   24 Hour Appointment Line [phone] or  [phone]) (toll-free)   24 Hour Nurse Line [phone] (toll-free)   Preferred Urgent Care [facility], [phone]     [facility]  [phone]     Preferred Pharmacy [facility]/pharmacy #[id number] [place] [id number] Behavioral Health Crisis Line The National Suicide Prevention Lifeline at 1-905.675.5507 or Text/Call 928           My Care Team Members  Patient Care Team         Relationship Specialty Notifications Start Yaneli Farley MD PCP - General   1/31/07     Phone: [phone] Pager: Use Vocera Fax: [phone] [address] [place] [address]    Yaneli Dozier MD Assigned PCP   10/4/12     Phone: [phone] Pager: Use Vocera Fax: [phone] [address] [place] [address]    Jerry Horvath MD MD Urology  3/29/19     Phone: [phone] Fax: [phone]         [address] [address]    Cheyenne Quinn RN Specialty Care Coordinator Urology  3/29/19     Phone: [phone]         Yaneli Dozier MD Referring Physician Internal Medicine  3/29/19     Phone: [phone] Pager: Use Vocera Fax: [phone] [address] [facility] [address]    Andrew Rachel MD MD Internal Medicine Admissions 6/16/21     Phone: [phone] Fax: [phone]         [address] [address]    Demarcus Simmons MD Assigned Endocrinology Provider   7/25/21     Phone: [phone] Fax: [phone] [address] [facility] [address]    Niles Cedillo MD Assigned Rheumatology Provider   11/21/21     Phone: [phone] Fax: [phone] [address] [facility] [address]    bAimael Fatima MD Assigned Heart and Vascular Provider   3/6/22     Phone: [phone] Fax: [phone] [address] [place] [address]    Yaneli Dozier MD Assigned Pain Medication Provider   1/9/23     Phone: [phone] Pager: Use Vocera Fax: [phone] [address] [place] [address]    Jerry Horvath MD Assigned Surgical Provider   2/18/23     Phone: [phone] Fax: [phone]         [address] [facility] [address]    Kee Morales PA-C Assigned Neuroscience Provider   7/22/23     Phone: [phone] Pager: [phone] Fax: [phone] [address] [facility] [address]    Olegario Patel MD Assigned Sleep Provider   2/2/24     Phone: [phone] Fax: [phone] [address] [facility] [address]    Emory Garcia DO Assigned Musculoskeletal Provider   5/23/24     Phone: [phone] Fax:  [phone]         [address] [facility] [address]    Clari Funez, [facility] Lead Care Coordinator  Admissions 7/29/24     Phone: [phone]                     My Care Plans  Self Management and Treatment Plan    Care Plan  Care Plan: Community Resources       Problem: Insufficient In-home support       Note:     Goal Statement: Harrison will continue working with Care Coordination to ensure his needs are met for overall health and wellbeing.  Date Goal Set: 7/31/24  Barriers: Finances tied up in divorce proceedings  Strengths: Strong support system  Date to Achieve By: 7/31/25  Patient expressed understanding of goal: yes  Action steps to achieve this goal:  1. I will continue to follow up with medical team as needed  2. I will work with SW to document my need for nursing home, and send documentation to attorneys in order to unfreeze some assets (divorce proceedings).    3. Once funds are available, I will work with SW to find an agency to help locate an assisted living.  3. I will outreach to Care Coordination  for further questions or concerns          Goal: Establish adequate home support                             Action Plans on File:                       Advance Care Plans/Directives:   Advanced Care Plan/Directives on file:   Yes    Status of Document(s):   On File and Validated    Advanced Care Plan/Directives Type:   Advanced Directive - On File           My Medical and Care Information  Problem List   Patient Active Problem List   Diagnosis    Essential hypertension, benign    Pain in joint, upper arm    Allergic rhinitis    Pain in joint, lower leg    Chronic airway obstruction (H)    Monoclonal paraproteinemia    Immunodeficiency (H24)    Eczema    Transient cerebral ischemia    Bunion    Occipital neuralgia    Hyperlipidemia LDL goal <100    Low back pain    Olecranon bursitis of right elbow    Bruit    Retina hole    signed & scanned on 09/23/2011  (1-4-2013 printed but not scanned  in)     Chronic, continuous use of opioids    Atherosclerosis of native coronary artery of native heart without angina pectoris    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, unspecified thyrotoxicosis type    Right-sided low back pain with right-sided sciatica    Coronary artery disease involving native coronary artery of native heart without angina pectoris    BRENNEN (obstructive sleep apnea)    Hammer toe of right foot    Hallux limitus of right foot    Corn of toe    Non-recurrent unilateral inguinal hernia with obstruction without gangrene    Left inguinal hernia    Metastasis from thyroid cancer (H)    Pulmonary nodules    Preoperative examination    Chronic pain disorder    Dehydration    Shock (H)    CALLUM (acute kidney injury) (H24)    COPD, very severe (H)    Hypokalemia    Hypoxia    Spleen hematoma, initial encounter    Fall, initial encounter    SAH (subarachnoid hemorrhage) (H)    Malignant neoplasm metastatic to lung, unspecified laterality (H)    Hospital-acquired pneumonia    Radiation pneumonitis (H24)    Physical deconditioning    Closed nondisplaced fracture of pelvis, unspecified part of pelvis, initial encounter (H)    Lumbar compression fracture, with delayed healing, subsequent encounter      Current Medications and Allergies:  See printed Medication Report.    Care Coordination Start Date: 7/26/2024   Frequency of Care Coordination: 2 weeks, more frequently as needed     Form Last Updated: 07/31/2024

## 2024-07-31 NOTE — PROGRESS NOTES
Clinic Care Coordination Contact  Clinic Care Coordination Contact  OUTREACH    Referral Information:  Referral Source: PCP  Primary Diagnosis: Psychosocial    SW spoke with pt about his overall wellbeing and current needs and goals.  Pt shares that he is going through a divorce and his assets are all tied up in that process.  Pt needed some prompting, but was able to articulate that he wants to live in an assisted living but cannot because assets are tied up in divorce proceedings.  Pt says he needs a letter to give to the divorce attorneys indicating what his medical issues are and documenting that he is not doing well in his daughter's home due to the layout as he has to go up/down 14 stairs to go to the bathroom or his bedroom and this is extremely difficult for him with SOB, weakness, and tendency to fall.  Pt shares that he fell down the stairs again last week, even though he went really slow and used the railing.    SW asks about ADL and IADLs.  Pt shares that his family helps with most everything including meals, transportation, some hygiene, shopping, and paperwork.    SW asks pt if he would like to enroll in [facility], and we can assist him with the process of trying to remove barriers to his assets, and then finding an assisted living.  Pt in agreement.    Of note, pt spoke very slowly but was able to articulate his needs with some patience and supportive questions.      Chief Complaint   Patient presents with    Clinic Care Coordination - Initial        Universal Utilization:   Clinic Utilization  Difficulty keeping appointments:: No  Utilization      No Show Count (past year)  0             ED Visits  2             Hospital Admissions  2                    Current as of: 7/31/2024 10:10 AM                Clinical Concerns:  Current Medical Concerns:  Psychosocial     Current Behavioral Concerns: N/A    Education Provided to patient: CCC, longterm      Health Maintenance Reviewed: Due/Overdue   Health Maintenance  Due   Topic Date Due    COPD ACTION PLAN  07/28/2018    PSA  12/03/2023    COVID-19 Vaccine (8 - 2023-24 season) 12/15/2023       Clinical Pathway: None    Medication Management:  Medication review status: Medications reviewed and no changes reported per patient.             Functional Status:  Dependent IADLs:: Cleaning, Cooking, Laundry, Meal Preparation  Bed or wheelchair confined:: No  Mobility Status: Independent w/Device  Fallen 2 or more times in the past year?: (!) Yes    Living Situation:  Current living arrangement:: I live in a private home with family (Lives with daughter at her home)  Type of residence:: Private home - stairs    Lifestyle & Psychosocial Needs:    Social Determinants of Health     Food Insecurity: No Food Insecurity (4/17/2024)    Received from [facility]    Food Insecurity     Worried About Running Out of Food in the Last Year: 1   Depression: Not at risk (5/24/2024)    PHQ-2     PHQ-2 Score: 0   Recent Concern: Depression - At risk (3/19/2024)    PHQ-2     PHQ-2 Score: 3   Housing Stability: Low Risk  (4/17/2024)    Received from [facility]    Housing Stability     Unable to Pay for Housing in the Last Year: 1   Tobacco Use: Medium Risk (7/26/2024)    Patient History     Smoking Tobacco Use: Former     Smokeless Tobacco Use: Never     Passive Exposure: Not on file   Financial Resource Strain: Low Risk  (4/17/2024)    Received from [facility]    Financial Resource Strain     Difficulty of Paying Living Expenses: 3     Difficulty of Paying Living Expenses: Not on file   Alcohol Use: Not on file   Transportation Needs: No Transportation Needs (4/17/2024)    Received from [facility]    Transportation Needs     Lack of Transportation (Medical): 1   Physical Activity: Not on file   Interpersonal Safety: Low Risk  (5/24/2024)    Interpersonal Safety     Do  you feel physically and emotionally safe where you currently live?: Yes     Within the past 12 months, have you been hit, slapped, kicked or otherwise physically hurt by someone?: No     Within the past 12 months, have you been humiliated or emotionally abused in other ways by your partner or ex-partner?: No   Stress: Not on file   Social Connections: Socially Integrated (4/17/2024)    Received from [facility]    Social Connections     Frequency of Communication with Friends and Family: 0   Health Literacy: Not on file        Transportation means:: Regular car     Islam or spiritual beliefs that impact treatment:: No  Mental health DX:: No  Chemical Dependency Status: No Current Concerns  Informal Support system:: Children, Family        Resources and Interventions:  Current Resources:   Skilled Home Care Services: Skilled Nursing, Home Health Aid, Physical Therapy  Community Resources: None  Supplies Currently Used at Home: Oxygen Tubing/Supplies  Equipment Currently Used at Home: walker, standard  Employment Status: retired         Advance Care Plan/Directive  Advanced Care Plans/Directives on file:: Yes    Referrals Placed: Community Resources     Care Plan:  Care Plan: Community Resources       Problem: Insufficient In-home support       Note:     Goal Statement: Harrison will continue working with Care Coordination to ensure his needs are met for overall health and wellbeing.  Date Goal Set: 7/31/24  Barriers: Finances tied up in divorce proceedings  Strengths: Strong support system  Date to Achieve By: 7/31/25  Patient expressed understanding of goal: yes  Action steps to achieve this goal:  1. I will continue to follow up with medical team as needed  2. I will work with  to document my need for correction, and send documentation to attorneys in order to unfreeze some assets (divorce proceedings).    3. Once funds are available, I will work with  to find an agency to help locate  an assisted living.  3. I will outreach to Care Coordination  for further questions or concerns          Goal: Establish adequate home support                             Patient/Caregiver understanding: yes2    Outreach Frequency: 2 weeks, more frequently as needed  Future Appointments                In 1 month Heydi Whitaker APRN CNP [facility] PSA CLIN    In 1 month Kate Carbajal MD [facility] Neurology [facility],     In 2 months Khoa Handy MD [facility]    In 2 months Yaneli Dozier MD [facility],     In 7 months Demarcus Simmons MD [facility] Specialty Clinic [facility]            Plan: SW to request letter from PCP, follow up in 2 weeks.     Clari Funez,  [facility]  Clinic Care Coordinator  [facility]  [phone]  [email or web address]

## 2024-07-31 NOTE — LETTER
[facility] CARE COORDINATION  [address]  [place] [address]    July 31, 2024    Harrison Thomas  [address] [place] [address]      Dear Harrison,    I am a clinic care coordinator who works with Yaneli Dozier MD with the [facility]. I wanted to thank you for spending the time to talk with me.  Below is a description of clinic care coordination and how I can further assist you.       The clinic care coordination team is made up of a registered nurse, , financial resource worker and community health worker who understand the health care system. The goal of clinic care coordination is to help you manage your health and improve access to the health care system. Our team works alongside your provider to assist you in determining your health and social needs. We can help you obtain health care and community resources, providing you with necessary information and education. We can work with you through any barriers and develop a care plan that helps coordinate and strengthen the communication between you and your care team.  Our services are voluntary and are offered without charge to you personally.    Please feel free to contact me with any questions or concerns regarding care coordination and what we can offer.      We are focused on providing you with the highest-quality healthcare experience possible.    Sincerely,     Clari Funez [facility]  Clinic Care Coordinator  [facility]  [phone]

## 2024-08-05 DIAGNOSIS — S32.000G LUMBAR COMPRESSION FRACTURE, WITH DELAYED HEALING, SUBSEQUENT ENCOUNTER: ICD-10-CM

## 2024-08-05 DIAGNOSIS — S32.592G: Primary | ICD-10-CM

## 2024-08-05 RX ORDER — OXYCODONE HYDROCHLORIDE 5 MG/1
5 TABLET ORAL EVERY 6 HOURS PRN
Qty: 120 TABLET | Refills: 0 | Status: SHIPPED | OUTPATIENT
Start: 2024-08-05 | End: 2024-08-30

## 2024-08-05 NOTE — TELEPHONE ENCOUNTER
Patient calling, patient needs a prescription refill for oxycodone.     ROGERS Toro  [facility]

## 2024-08-05 NOTE — TELEPHONE ENCOUNTER
Last Rx was for #120 on 7/1/24   Re-pended for quantity of #120     Zaria HAYS, Triage RN  [facility]

## 2024-08-06 DIAGNOSIS — J44.9 COPD, VERY SEVERE (H): Primary | ICD-10-CM

## 2024-08-06 RX ORDER — PREDNISONE 1 MG/1
TABLET ORAL
Qty: 60 TABLET | Refills: 3 | Status: SHIPPED | OUTPATIENT
Start: 2024-08-06

## 2024-08-19 DIAGNOSIS — J18.9 PNEUMONIA DUE TO INFECTIOUS ORGANISM, UNSPECIFIED LATERALITY, UNSPECIFIED PART OF LUNG: ICD-10-CM

## 2024-08-19 DIAGNOSIS — R93.89 ABNORMAL CHEST CT: ICD-10-CM

## 2024-08-19 DIAGNOSIS — J42 CHRONIC BRONCHITIS, UNSPECIFIED CHRONIC BRONCHITIS TYPE (H): ICD-10-CM

## 2024-08-19 DIAGNOSIS — G47.33 OBSTRUCTIVE SLEEP APNEA: ICD-10-CM

## 2024-08-19 DIAGNOSIS — R91.8 MASS OF UPPER LOBE OF RIGHT LUNG: ICD-10-CM

## 2024-08-19 DIAGNOSIS — J70.0 RADIATION PNEUMONITIS (H): ICD-10-CM

## 2024-08-19 DIAGNOSIS — J43.8 OTHER EMPHYSEMA (H): ICD-10-CM

## 2024-08-19 RX ORDER — VITAMIN B COMPLEX
25 TABLET ORAL DAILY
Qty: 180 TABLET | Refills: 3 | Status: SHIPPED | OUTPATIENT
Start: 2024-08-19

## 2024-08-19 RX ORDER — FLUTICASONE FUROATE, UMECLIDINIUM BROMIDE AND VILANTEROL TRIFENATATE 200; 62.5; 25 UG/1; UG/1; UG/1
1 POWDER RESPIRATORY (INHALATION) DAILY
Qty: 180 EACH | Refills: 3 | Status: SHIPPED | OUTPATIENT
Start: 2024-08-19

## 2024-08-19 NOTE — TELEPHONE ENCOUNTER
Patient states that he needs refill of Trelegy Ellipta inhaler. Prescription was marked local print when order 7/15/24. Patient states that he does not have the paper copy.  Routing refill request to provider for review/approval because:  Quantity does not match directions. Is inhaler daily or twice a day.   Current quantity is 180. Directions state to use daily in the am.   Silvia Durham RN

## 2024-08-20 DIAGNOSIS — E89.0 POSTSURGICAL HYPOTHYROIDISM: ICD-10-CM

## 2024-08-20 DIAGNOSIS — M81.0 SENILE OSTEOPOROSIS: ICD-10-CM

## 2024-08-20 DIAGNOSIS — C73 PAPILLARY THYROID CARCINOMA (H): ICD-10-CM

## 2024-08-20 RX ORDER — LEVOTHYROXINE SODIUM 125 UG/1
125 TABLET ORAL DAILY
Qty: 90 TABLET | Refills: 1 | Status: SHIPPED | OUTPATIENT
Start: 2024-08-20

## 2024-08-20 RX ORDER — ALENDRONATE SODIUM 70 MG/1
TABLET ORAL
Qty: 12 TABLET | Refills: 2 | Status: SHIPPED | OUTPATIENT
Start: 2024-08-20

## 2024-08-20 NOTE — TELEPHONE ENCOUNTER
Last Written Prescription Date:  12/3/23  Last Fill Quantity: 90,  # refills: 3   Last office visit: 3/7/2024 ; last virtual visit: Visit date not found with prescribing provider:  Dr. Simmons   Future Office Visit: 3/4/25  Next 5 appointments (look out 90 days)      Oct 15, 2024 10:30 AM  (Arrive by 10:10 AM)  Annual Wellness Visit with Yaneli Dozier MD  [facility] ([facility] ) [address]  [place] [address]  [phone]           Requested Prescriptions   Pending Prescriptions Disp Refills    levothyroxine (SYNTHROID/LEVOTHROID) 125 MCG tablet [Pharmacy Med Name: LEVOTHYROXINE 125 MCG TABLET] 90 tablet 2     Sig: TAKE 1 TABLET BY MOUTH EVERY DAY       Thyroid Protocol Passed - 8/20/2024 12:56 PM        Passed - Patient is 12 years or older        Passed - Recent (12 mo) or future (30 days) visit within the authorizing provider's specialty     The patient must have completed an in-person or virtual visit within the past 12 months or has a future visit scheduled within the next 90 days with the authorizing provider s specialty.  Urgent care and e-visits do not quality as an office visit for this protocol.          Passed - Medication is active on med list        Passed - Medication indicated for associated diagnosis     Medication is associated with one or more of the following diagnoses:  Hypothyroidism  Thyroid stimulating hormone suppression therapy  Thyroid cancer  Acquired atrophy of thyroid          Passed - Normal TSH on file in past 12 months     Recent Labs   Lab Test 04/26/24  1803   TSH 1.80                 New pharmacy requesting Rx.  Sent refills. Danielle Gaitan RN

## 2024-08-23 ENCOUNTER — TELEPHONE (OUTPATIENT)
Dept: FAMILY MEDICINE | Facility: CLINIC | Age: 79
End: 2024-08-23
Payer: MEDICARE

## 2024-08-23 NOTE — TELEPHONE ENCOUNTER
Home Care is calling regarding an established patient with [facility].        1/19/2024     1:54 PM   Home Care Information   Date of Home Care episode start 1/22/2024   Current following provider Dr. Dozier   Date provider agreed to follow 1/22/2024    Name/Phone Number Haydeeor ROGERS,[phone]   Home Care agency [facility]     Requesting orders from: Yaneli Dozier  Provider is following patient: Yes  Is this a 60-day recertification request?  No    Orders Requested    Physical Therapy  Request for delay in care, service is not able to be provided within same scheduled day.   Delay from this week to next week due to patient request (he had an appointment)    Information was gathered and will be sent to provider for review.  RN will contact Home Care with information after provider review.  Confirmed ok to leave a detailed message with call back.  Contact information confirmed and updated as needed.    Rebecca Salmeron, RN

## 2024-08-26 NOTE — TELEPHONE ENCOUNTER
Spoke to PT to inform her about the provider's approval for home care orders for PT.     Ted Dupont RN  [facility]

## 2024-08-27 ENCOUNTER — TELEPHONE (OUTPATIENT)
Dept: FAMILY MEDICINE | Facility: CLINIC | Age: 79
End: 2024-08-27
Payer: MEDICARE

## 2024-08-27 NOTE — TELEPHONE ENCOUNTER
Home Care is calling regarding an established patient with [facility].        1/19/2024     1:54 PM   Home Care Information   Date of Home Care episode start 1/22/2024   Current following provider Dr. Dozier   Date provider agreed to follow 1/22/2024    Name/Phone Number Haydeeor ROGERS,[phone]   Home Care agency [facility]     Requesting orders from: Yaneli Dozier  Provider is following patient: Yes  Is this a 60-day recertification request?  No    Orders Requested    Physical Therapy  Request for continuation of care with no increase or decrease in frequency  Frequency:  1 visit every other week for 2 weeks        Verbal orders given.  Home Care will send orders for provider to sign.  Confirmed ok to leave a detailed message with call back.  Contact information confirmed and updated as needed.    Afsaneh Monge RN

## 2024-08-30 ENCOUNTER — PATIENT OUTREACH (OUTPATIENT)
Dept: CARE COORDINATION | Facility: CLINIC | Age: 79
End: 2024-08-30
Payer: MEDICARE

## 2024-08-30 DIAGNOSIS — S32.000G LUMBAR COMPRESSION FRACTURE, WITH DELAYED HEALING, SUBSEQUENT ENCOUNTER: ICD-10-CM

## 2024-08-30 DIAGNOSIS — S32.592G: ICD-10-CM

## 2024-08-30 RX ORDER — OXYCODONE HYDROCHLORIDE 5 MG/1
5 TABLET ORAL EVERY 6 HOURS PRN
Qty: 120 TABLET | Refills: 0 | Status: SHIPPED | OUTPATIENT
Start: 2024-08-30

## 2024-08-30 NOTE — PROGRESS NOTES
Clinic Care Coordination Contact  [facility]/Voicemail    Clinical Data: Care Coordinator Outreach    Outreach Documentation Number of Outreach Attempt   7/29/2024  12:34 PM 1   8/30/2024  11:12 AM 1       Left message on patient's voicemail with call back information and requested return call.    Plan: Care Coordinator will try to reach patient again in 10 business days.    Clari Funez,  [facility]  Clinic Care Coordinator  [facility]  [phone]  [email or web address]

## 2024-09-06 ENCOUNTER — TELEPHONE (OUTPATIENT)
Dept: PULMONOLOGY | Facility: CLINIC | Age: 79
End: 2024-09-06
Payer: MEDICARE

## 2024-09-06 NOTE — TELEPHONE ENCOUNTER
Called [facility] at [phone] and spoke with rep Peng. Per Danish, they never received the rx for Trelegy in July.   Rx has been re-faxed to [phone]. Confirmed that this was the correct fax number.

## 2024-09-06 NOTE — TELEPHONE ENCOUNTER
----- Message from Candie LO sent at 9/6/2024  1:10 PM CDT -----  Can you please reach out to [facility] regarding Trelegy prescription for patient. He is saying he needs another prescription but it looks like you FAXed it in July.

## 2024-09-11 ENCOUNTER — TELEPHONE (OUTPATIENT)
Dept: FAMILY MEDICINE | Facility: CLINIC | Age: 79
End: 2024-09-11
Payer: MEDICARE

## 2024-09-11 NOTE — TELEPHONE ENCOUNTER
To clarify:    Patient has RECEIVED RSV vaccine last year.    Do you think that patient should receive a SECOND RSV vaccination?      Reference:

## 2024-09-11 NOTE — TELEPHONE ENCOUNTER
Patient wants to know if PCP recommends he get RSV vaccine. Per chart review, pt received last rx 09/15/2023.     Pt received Influenza and COVID-19 vaccines today. Vaccine record updated.     Pt needs a call back with PCP's advice on RVS vaccine. Ok to leave a detailed voice message.    Consent: The patient's consent was obtained including but not limited to risks of crusting, scabbing, blistering, scarring, darker or lighter pigmentary change, recurrence, incomplete removal and infection. Detail Level: Detailed Show Applicator Variable?: Yes Render Note In Bullet Format When Appropriate: No Duration Of Freeze Thaw-Cycle (Seconds): 5 Number Of Freeze-Thaw Cycles: 1 freeze-thaw cycle Post-Care Instructions: I reviewed with the patient in detail post-care instructions. Patient is to wear sunprotection, and avoid picking at any of the treated lesions. Pt may apply Vaseline to crusted or scabbing areas. no ---

## 2024-09-12 ENCOUNTER — TELEPHONE (OUTPATIENT)
Dept: FAMILY MEDICINE | Facility: CLINIC | Age: 79
End: 2024-09-12
Payer: MEDICARE

## 2024-09-12 NOTE — TELEPHONE ENCOUNTER
Writer contacted patient and relayed provider message below~  Patient expressed understanding.    Reference message:

## 2024-09-12 NOTE — TELEPHONE ENCOUNTER
Home Care is calling regarding an established patient with [facility].        1/19/2024     1:54 PM   Home Care Information   Date of Home Care episode start 1/22/2024   Current following provider Dr. Dozier   Date provider agreed to follow 1/22/2024    Name/Phone Number Haydeeor ROGERS,[phone]   Home Care agency [facility]     Requesting orders from: Yaneli Dozier  Provider is following patient: Yes  Is this a 60-day recertification request?  No    Orders Requested    Physical Therapy  Request for continuation of care with increase in frequency  Frequency: 1w2        Verbal orders given.  Home Care will send orders for provider to sign.  Confirmed ok to leave a detailed message with call back.  Contact information confirmed and updated as needed.    Opal Wilkerson RN

## 2024-09-16 ENCOUNTER — DOCUMENTATION ONLY (OUTPATIENT)
Dept: PULMONOLOGY | Facility: CLINIC | Age: 79
End: 2024-09-16
Payer: MEDICARE

## 2024-09-16 DIAGNOSIS — J44.9 CHRONIC OBSTRUCTIVE PULMONARY DISEASE, UNSPECIFIED (H): Primary | ICD-10-CM

## 2024-09-18 NOTE — TELEPHONE ENCOUNTER
PT Sruthi is requesting response on order below. Message was not sent to provider for approval.     Please advice if provider approves PT order. Sruthi will need a call back. VM is confidential.

## 2024-09-20 ENCOUNTER — LAB (OUTPATIENT)
Dept: LAB | Facility: CLINIC | Age: 79
End: 2024-09-20
Payer: MEDICARE

## 2024-09-20 ENCOUNTER — HOSPITAL ENCOUNTER (OUTPATIENT)
Dept: GENERAL RADIOLOGY | Facility: CLINIC | Age: 79
Discharge: HOME | End: 2024-09-20
Attending: NURSE PRACTITIONER | Admitting: NURSE PRACTITIONER
Payer: MEDICARE

## 2024-09-20 ENCOUNTER — OFFICE VISIT (OUTPATIENT)
Dept: CARDIOLOGY | Facility: CLINIC | Age: 79
End: 2024-09-20
Payer: MEDICARE

## 2024-09-20 ENCOUNTER — DOCUMENTATION ONLY (OUTPATIENT)
Dept: CARDIOLOGY | Facility: CLINIC | Age: 79
End: 2024-09-20

## 2024-09-20 VITALS
WEIGHT: 156 LBS | OXYGEN SATURATION: 98 % | DIASTOLIC BLOOD PRESSURE: 75 MMHG | HEIGHT: 70 IN | HEART RATE: 59 BPM | BODY MASS INDEX: 22.33 KG/M2 | SYSTOLIC BLOOD PRESSURE: 134 MMHG

## 2024-09-20 DIAGNOSIS — I25.10 CORONARY ARTERY DISEASE INVOLVING NATIVE CORONARY ARTERY OF NATIVE HEART WITHOUT ANGINA PECTORIS: Primary | ICD-10-CM

## 2024-09-20 DIAGNOSIS — R06.02 SHORTNESS OF BREATH: ICD-10-CM

## 2024-09-20 DIAGNOSIS — E78.5 HYPERLIPIDEMIA LDL GOAL <100: ICD-10-CM

## 2024-09-20 DIAGNOSIS — J44.9 COPD, VERY SEVERE (H): ICD-10-CM

## 2024-09-20 DIAGNOSIS — I25.10 CORONARY ARTERY DISEASE INVOLVING NATIVE CORONARY ARTERY OF NATIVE HEART WITHOUT ANGINA PECTORIS: ICD-10-CM

## 2024-09-20 DIAGNOSIS — R06.02 SHORTNESS OF BREATH: Primary | ICD-10-CM

## 2024-09-20 DIAGNOSIS — I10 ESSENTIAL HYPERTENSION, BENIGN: ICD-10-CM

## 2024-09-20 LAB
ANION GAP SERPL CALCULATED.3IONS-SCNC: 9 MMOL/L (ref 7–15)
BUN SERPL-MCNC: 13.1 MG/DL (ref 8–23)
CALCIUM SERPL-MCNC: 9.8 MG/DL (ref 8.8–10.4)
CHLORIDE SERPL-SCNC: 105 MMOL/L (ref 98–107)
CREAT SERPL-MCNC: 0.92 MG/DL (ref 0.67–1.17)
EGFRCR SERPLBLD CKD-EPI 2021: 86 ML/MIN/1.73M2
ERYTHROCYTE [DISTWIDTH] IN BLOOD BY AUTOMATED COUNT: 13.2 % (ref 10–15)
GLUCOSE SERPL-MCNC: 96 MG/DL (ref 70–99)
HCO3 SERPL-SCNC: 32 MMOL/L (ref 22–29)
HCT VFR BLD AUTO: 47.9 % (ref 40–53)
HGB BLD-MCNC: 16.1 G/DL (ref 13.3–17.7)
MCH RBC QN AUTO: 30.8 PG (ref 26.5–33)
MCHC RBC AUTO-ENTMCNC: 33.6 G/DL (ref 31.5–36.5)
MCV RBC AUTO: 92 FL (ref 78–100)
NT-PROBNP SERPL-MCNC: 158 PG/ML (ref 0–1800)
PLATELET # BLD AUTO: 232 10E3/UL (ref 150–450)
POTASSIUM SERPL-SCNC: 4.4 MMOL/L (ref 3.4–5.3)
RBC # BLD AUTO: 5.23 10E6/UL (ref 4.4–5.9)
SODIUM SERPL-SCNC: 146 MMOL/L (ref 135–145)
WBC # BLD AUTO: 8.4 10E3/UL (ref 4–11)

## 2024-09-20 PROCEDURE — 36415 COLL VENOUS BLD VENIPUNCTURE: CPT

## 2024-09-20 PROCEDURE — 71046 X-RAY EXAM CHEST 2 VIEWS: CPT

## 2024-09-20 PROCEDURE — 85027 COMPLETE CBC AUTOMATED: CPT

## 2024-09-20 PROCEDURE — 83880 ASSAY OF NATRIURETIC PEPTIDE: CPT

## 2024-09-20 PROCEDURE — 99214 OFFICE O/P EST MOD 30 MIN: CPT | Performed by: NURSE PRACTITIONER

## 2024-09-20 PROCEDURE — 80048 BASIC METABOLIC PNL TOTAL CA: CPT
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
## 2024-09-20 NOTE — PROGRESS NOTES
Patient was seen in the clinic today with complaints of worsening dyspnea.  Upon exam, lung sounds were decreased, worse to the right.  Preliminary results of chest x-ray showed stable fibrosis of the right upper lobe and no acute infiltrates.  Continue with current medications as discussed at office visit  Follow-up with PCP and/or pulmonologist as scheduled  Follow-up with cardiology in 1 year or sooner if needed  Please inform patient of results.
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
## 2024-09-20 NOTE — PROGRESS NOTES
Contacted patient to review results. No answer. Left detailed message with results and Heydi's comments. Instructed patient to call back with any further questions.

## 2024-09-23 ENCOUNTER — OFFICE VISIT (OUTPATIENT)
Dept: NEUROLOGY | Facility: CLINIC | Age: 79
End: 2024-09-23
Payer: MEDICARE

## 2024-09-23 ENCOUNTER — TELEPHONE (OUTPATIENT)
Dept: INTERNAL MEDICINE | Facility: CLINIC | Age: 79
End: 2024-09-23

## 2024-09-23 VITALS
BODY MASS INDEX: 22.39 KG/M2 | OXYGEN SATURATION: 97 % | HEIGHT: 70 IN | WEIGHT: 156.38 LBS | DIASTOLIC BLOOD PRESSURE: 79 MMHG | SYSTOLIC BLOOD PRESSURE: 162 MMHG | HEART RATE: 53 BPM

## 2024-09-23 DIAGNOSIS — E03.8 OTHER SPECIFIED HYPOTHYROIDISM: ICD-10-CM

## 2024-09-23 DIAGNOSIS — E74.89 OTHER SPECIFIED DISORDERS OF CARBOHYDRATE METABOLISM (H): ICD-10-CM

## 2024-09-23 DIAGNOSIS — G62.9 NEUROPATHY: ICD-10-CM

## 2024-09-23 DIAGNOSIS — M54.12 CERVICAL RADICULOPATHY: ICD-10-CM

## 2024-09-23 DIAGNOSIS — G20.A1 PARKINSON'S DISEASE WITHOUT DYSKINESIA OR FLUCTUATING MANIFESTATIONS (H): Primary | ICD-10-CM

## 2024-09-23 PROCEDURE — 99205 OFFICE O/P NEW HI 60 MIN: CPT | Performed by: STUDENT IN AN ORGANIZED HEALTH CARE EDUCATION/TRAINING PROGRAM

## 2024-09-23 PROCEDURE — G2211 COMPLEX E/M VISIT ADD ON: HCPCS | Performed by: STUDENT IN AN ORGANIZED HEALTH CARE EDUCATION/TRAINING PROGRAM

## 2024-09-23 RX ORDER — CARBIDOPA AND LEVODOPA 25; 100 MG/1; MG/1
TABLET ORAL
Qty: 135 TABLET | Refills: 11 | Status: SHIPPED | OUTPATIENT
Start: 2024-09-23

## 2024-09-23 ASSESSMENT — UNIFIED PARKINSONS DISEASE RATING SCALE (UPDRS)
RIGIDITY_RUE: (2) MILD: RIGIDITY DETECTED WITHOUT THE ACTIVATION MANEUVER. FULL RANGE OF MOTION IS EASILY ACHIEVED.
PRONATION_SUPINATION_RIGHT: (1) SLIGHT: ANY OF THE FOLLOWING: A) THE REGULAR RHYTHM IS BROKEN WITH ONE WITH ONE OR TWO INTERRUPTIONS OR HESITATIONS OF THE MOVEMENT  B) SLIGHT SLOWING  C) THE AMPLITUDE DECREMENTS NEAR THE END OF THE 10 MOVEMENTS.
RIGIDITY_LUE: (2) MILD: RIGIDITY DETECTED WITHOUT THE ACTIVATION MANEUVER. FULL RANGE OF MOTION IS EASILY ACHIEVED.
POSTURAL_STABILITY: (3) MODERATE: STANDS SAFELY, BUT WITH ABSENCE OF POSTURAL RESPONSE, FALLS IF NOT CAUGHT BY EXAMINER.
TOTAL_SCORE_LEFT: 16
AMPLITUDE_RLE: (0) NORMAL: NO TREMOR.
AXIAL_SCORE: 14
DYSKINESIAS_PRESENT: NO
TOETAPPING_RIGHT: (0) NORMAL: NO PROBLEMS.
RIGIDITY_NECK: (2) MILD: RIGIDITY DETECTED WITHOUT THE ACTIVATION MANEUVER. FULL RANGE OF MOTION IS EASILY ACHIEVED.
RIGIDITY_RLE: (1) SLIGHT: RIGIDITY ONLY DETECTED WITH ACTIVATION MANEUVER.
TOTAL_SCORE: 39
HANDMOVEMENTS_LEFT: (2) MILD: ANY OF THE FOLLOWING: A) 3 TO 5 INTERRUPTIONS DURING TAPPING  B) MILD SLOWING  C) THE AMPLITUDE DECREMENTS MIDWAY IN THE 10-MOVEMENT SEQUENCE.
FINGER_TAPPING_LEFT: (3) MODERATE: ANY OF THE FOLLOWING: A) MORE THAN 5 INTERRUPTIONS OR AT LEAST ONE LONGER ARREST (FREEZE) IN ONGOING MOVEMENT  B) MODERATE SLOWING  C) THE AMPLITUDE DECREMENTS STARTING AFTER THE FIRST MOVEMENT.
FACIAL_EXPRESSION: (1) SLIGHT: MINIMAL MASKED FACIES MANIFESTED ONLY BY DECREASED FREQUENCY OF BLINKING.
RIGIDITY_LLE: (2) MILD: RIGIDITY DETECTED WITHOUT THE ACTIVATION MANEUVER. FULL RANGE OF MOTION IS EASILY ACHIEVED.
POSTURE: (2) MILD: DEFINITE FLEXION, SCOLIOSIS OR LEANING TO ONE SIDE, BUT CAN CORRECT POSTURE TO NORMAL WHEN ASKED.
CONSTANCY_TREMOR_ATREST: (0) NORMAL: NO TREMOR.
TOETAPPING_LEFT: (1) SLIGHT: ANY OF THE FOLLOWING: A) THE REGULAR RHYTHM IS BROKEN WITH ONE WITH ONE OR TWO INTERRUPTIONS OR HESITATIONS OF THE MOVEMENT  B) SLIGHT SLOWING  C) THE AMPLITUDE DECREMENTS NEAR THE END OF THE 10 MOVEMENTS.
PRONATION_SUPINATION_LEFT: (2) MILD: ANY OF THE FOLLOWING: A) 3 TO 5 INTERRUPTIONS DURING TAPPING  B) MILD SLOWING  C) THE AMPLITUDE DECREMENTS MIDWAY IN THE 10-MOVEMENT SEQUENCE.
LEG_AGILITY_LEFT: (3) MODERATE: ANY OF THE FOLLOWING: A) MORE THAN 5 INTERRUPTIONS OR AT LEAST ONE LONGER ARREST (FREEZE) IN ONGOING MOVEMENT  B) MODERATE SLOWING  C) THE AMPLITUDE DECREMENTS STARTING AFTER THE FIRST MOVEMENT.
ARISING_CHAIR: (2) MILD: PUSHES SELF UP FROM ARMS OF CHAIR WITHOUT DIFFICULTY.
TOTAL_SCORE: 9
PARKINSONS_MEDS: OFF
SPONTANEITY_OF_MOVEMENT: (1) SLIGHT: SLIGHT GLOBAL SLOWNESS AND POVERTY OF SPONTANEOUS MOVEMENTS.
FREEZING_GAIT: (0) NORMAL: NO FREEZING.
GAIT: (2) MILD: INDEPENDENT WALKING BUT WITH SUBSTANTIAL GAIT IMPAIRMENT.
LEG_AGILITY_RIGHT: (1) SLIGHT: ANY OF THE FOLLOWING: A) THE REGULAR RHYTHM IS BROKEN WITH ONE WITH ONE OR TWO INTERRUPTIONS OR HESITATIONS OF THE MOVEMENT  B) SLIGHT SLOWING  C) THE AMPLITUDE DECREMENTS NEAR THE END OF THE 10 MOVEMENTS.
AMPLITUDE_RUE: (0) NORMAL: NO TREMOR.
SPEECH: (1) SLIGHT: LOSS OF MODULATION, DICTION OR VOLUME, BUT STILL ALL WORDS EASY TO UNDERSTAND.
AMPLITUDE_LIP_JAW: (0) NORMAL: NO TREMOR.
MOVEMENTS_INTERFERE_WITH_RATINGS: NO
AMPLITUDE_LUE: (0) NORMAL: NO TREMOR.
AMPLITUDE_LLE: (0) NORMAL: NO TREMOR.
HANDMOVEMENTS_RIGHT: (1) SLIGHT: ANY OF THE FOLLOWING: A) THE REGULAR RHYTHM IS BROKEN WITH ONE WITH ONE OR TWO INTERRUPTIONS OR HESITATIONS OF THE MOVEMENT  B) SLIGHT SLOWING  C) THE AMPLITUDE DECREMENTS NEAR THE END OF THE 10 MOVEMENTS.
FINGER_TAPPING_RIGHT: (2) MILD: ANY OF THE FOLLOWING: A) 3 TO 5 INTERRUPTIONS DURING TAPPING  B) MILD SLOWING  C) THE AMPLITUDE DECREMENTS MIDWAY IN THE 10-MOVEMENT SEQUENCE.

## 2024-09-23 NOTE — LETTER
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 currently no red flags for an atypical syndrome. He also has evidence of a length dependent neuropathy. His gait dysfunction is multifactorial and likely due to a combination of Parkinson's disease, neuropathy and orthopedic problems (hip replacement, low back pain). He is bothered by symptoms so we will start Carbidopa/Levodopa. Advised that this can help with gait due to improved slowness and stiffness but would not address other contributing factors or the postural instability that can be seen in Parkinson's disease. For neuropathy, I will order laboratory evaluation for reversible causes of neuropathy and order an EMG. 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 Discussed referral to spine clinic, although he states he would not be interested in surgical intervention. It may be worth considering  referral to pain clinic for additional management of chronic lower back pain.     - Reviewed the diagnosis of Parkinson's disease, additional resources provided in AVS  - Reviewed treatment options, opted to start Carbidopa/Levodopa  mg tablets  Sinemet (CD/LD) 25/100 mg IR AM Noon PM   Week 1                         0.5 tab 0.5 tab 0.5 tab   Week 2             1 1 1   Week 3                     1.5 1.5 1.5    - Reviewed potential side effects   - Detailed instructions provided in AVS  - Reviewed the importance of exercises, PD exercise resources provided in AVS  - Reviewed the importance of physical therapy, occupational therapy, speech therapy - referrals declined at this time  - EMG to evaluate neuropathy vs radiculopathy  - Neuropathy labs: Ha1c, vitamin B12, SPEP with immunofixation and TSH    Follow up in 6-8 weeks    Time Spent: 70 minutes spent on the date of the encounter doing chart review, history and exam, documentation and further activities as noted above    The longitudinal plan of care for the diagnosis(es)/condition(s) as documented were addressed during this visit. Due to the added complexity in care, I will continue to support Harrison in the subsequent management and with ongoing continuity of care.    Kate Carbajal MD  Movement Disorders Fellow      Again, thank you for allowing me to participate in the care of your patient.        Sincerely,        Kate Carbajal MD

## 2024-09-23 NOTE — TELEPHONE ENCOUNTER
Left a detailed message for RN about provider's approval for home care orders.     Ted Dupont RN  [facility]

## 2024-09-23 NOTE — NURSING NOTE
"Harrison Thomas is a 79 year old male who presents for:  Chief Complaint   Patient presents with    Parkinson     Parkinson's disease without dyskinesia or fluctuating manifestations        Initial Vitals:  BP (!) 162/79   Pulse 53   Ht 1.778 m (5' 10\")   Wt 70.9 kg (156 lb 6 oz)   SpO2 97%   BMI 22.44 kg/m   Estimated body mass index is 22.44 kg/m  as calculated from the following:    Height as of this encounter: 1.778 m (5' 10\").    Weight as of this encounter: 70.9 kg (156 lb 6 oz).. Body surface area is 1.87 meters squared. BP completed using cuff size: regular    Consuelo Ambrocio   "

## 2024-09-23 NOTE — TELEPHONE ENCOUNTER
Home Care is calling regarding an established patient with [facility].        1/19/2024     1:54 PM   Home Care Information   Date of Home Care episode start 1/22/2024   Current following provider Dr. Dozier   Date provider agreed to follow 1/22/2024    Name/Phone Number Isaac MCCLOUD,[phone]   Home Care agency [facility]     Requesting orders from: Yaneli Dozier  Provider is following patient: Yes  Is this a 60-day recertification request?  Yes    Orders Requested    Skilled Nursing  Request for recertification   Frequency:  one time per week x 2 weeks; one time every other week for 6 weeks      Information was gathered and will be sent to provider for review.  RN will contact Home Care with information after provider review.  Confirmed ok to leave a detailed message with call back.  Contact information confirmed and updated as needed.    Renetta Prescott RN

## 2024-09-23 NOTE — PATIENT INSTRUCTIONS
Your history and exam are consistent with a diagnosis of Parkinson's disease. This is a progressive neurodegenerative condition caused by loss of cells in the brain that produce a neurotransmitter (messaging chemical) called dopamine.     There are currently no medications that stop or slow the progression of Parkinson's disease, although there is ongoing research into this. We do have medications that can help treat the symptoms of Parkinson's disease. The medication we recommend starting is called Carbidopa/Levodopa (Sinemet).     Start Sinemet (carbidopa/levodopa) 25/100 mg IR as treatment for Parkinson disease. Use the following schedule to start Sinemet:  Sinemet (CD/LD) 25/100 mg IR AM Noon PM   Week 1                         0.5 tab 0.5 tab 0.5 tab   Week 2             1 1 1   Week 3                     1.5 1.5 1.5    - Try to take the carbidopa/levodopa at least 30 minutes apart from high -protein meals; however, it is okay if you can't always make that timing work. It is better to be consistent about taking the medication on time and may not be a big deal if it's close to a meal time. Some people have significant impact of protein on effectiveness of carbidopa/levodopa, other people have minimal impact. This medication is best taken on an empty stomach.    - May stop at dose that improves symptoms. I would expect your symptoms of bradykinesia (slowness), neck stiffness, tremor, and, to some degree, postural instability or trouble walking (shuffling). Freezing of gait may or may not respond to this medication.   - Common Side Effects discussed including: dizziness, nausea, constipation. If nausea or vomiting should occur, do not discontinue taking your medication and try taking with crackers or a non-protein snack (cracker, fruit). Also consider taking with ginger-namita (real ginger) to help with nausea.  - For intolerable symptoms, return to a previous dose that was not associated with side effects.    - Iron  may interfere with the effectiveness of this medication so do not take iron supplements at the same time you are taking Sinemet.      Exercise has been shown to slow progression of symptoms in Parkinson's disease. We recommend targeting at least 20-30 minutes of exercise that raises your heart rate 3-5 days per week. Exercise resources are listed below.     Physical therapy, occupational therapy and speech therapy are all great resources. Physical therapy can be particularly helpful with balance. Occupational therapy can help with a number of daily activities. Speech therapy addresses both speech changes and swallowing difficulties. We are happy to place referrals to these services at any time.     Your falls are likely due to multiple factors including the arthritis in your lower back, Parkinson's disease and neuropathy (nerve damage). I have ordered an EMG to further evaluate the numbness in your legs and have also ordered some blood work to look for potential causes.     Parkinson's Resources:    Guide for Newly Diagnosed    Parkinson's Pulse Newsletter    Parkinson's 101 Video    Living Your Best Life: A Guide to Parkinson's disease    National Parkinsons Foundation    American Parkinsons Disease Association     Mark. Agarwal Foundation    [facility]    American Academy of Neurology YouTube Channel    [facility] Events Calendar    We are excited to share the news of a new initiative piloted by the [facility], ParkinsonTV, in collaboration with Dr. Avery Butcher and [facility] [place]. The educational TV series brings together neurologists, patients, and topic experts to discuss different aspects of living with Parkinson s and ways to maximize quality of life. The first season will consist of six episodes on exercise, medication, nutrition, advanced therapies, speech therapy, and occupational therapy. First episode is below:     ParkinsonTV    Here is a great  2 minute video that explains how to meditate.  https://www.Solar Componentsube.com/watch?v=rqoxYKtEWEc          Parkinson's Disease Exercise Resources:    Kadie Parsonson exercise class and support group: [phone] or [email or web address].    Https://www.emoquokinsons.org/    Https://www.parkinson.org/[place]    https://www.apdaparkinson.org/community/[place]/local-resources-support/    Https://www.apdGunnison Valley Hospitalrkinson.org/community/[place]/    Https://www.srb7kygy.org/    PMD Aristes [place] Exercise Groups by [facility]  https://www.pmdalliance.org/resources/exercise-groups/[place]/    The Boxing club: Knock out Parkinson's boxing program    [place] - [phone]  [place] - [phone]  [facility] [phone]  [facility] [phone]    [employer]  https://members.[employer]/member-directory  Call [phone]    [employer]  https://tools.[employer]/    ADPDA Listing of Events  https://www.apdaparCodeshipon.org/upcoming-events/?eType=EmailBlastContent&eTi=[email or web address]    [employer] Foundation Events  https://Carepeutics.org/events/    Dance for Parkinsons  https://danceforEnefgy.org/resources/dance-at-home    Seated Strength and Cardio  Https://www.S[employer]onentsube.com/channel/MZ78B8FauXvyTLHXnj2bMXcf/feed    Fadi Chi  Https://www.S[employer]onentsube.com/channel/TD47K8YiaNpkNFENpl2nNFuo/feed    [employer] [facility] [place]  Phone: [phone]  https://www.Revision3.org/    Seated Exercises for Parkinson's  https://www.Billettoundation.org/  Monday is Yoga. Tuesday is General Exercise. Wednesday is Strength Training. Thursday is Range-of-Motion.    Call the Parkinson's Foundation Helpline 3.950.4PD.INFO (1-445.667.5152) for exercise resources in your area.     Free ebook about PD

## 2024-09-23 NOTE — PROGRESS NOTES
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 Advised that this can help with gait due to improved slowness and stiffness but would not address other contributing factors or the postural instability that can be seen in Parkinson's disease. For neuropathy, I will order laboratory evaluation for reversible causes of neuropathy and order an EMG. 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 Discussed referral to spine clinic, although he states he would not be interested in surgical intervention. It may be worth considering referral to pain clinic for additional management of chronic lower back pain.     - Reviewed the diagnosis of Parkinson's disease, additional resources provided in AVS  - Reviewed treatment options, opted to start Carbidopa/Levodopa  mg tablets  Sinemet (CD/LD) 25/100 mg IR AM Noon PM   Week 1                         0.5 tab 0.5 tab 0.5 tab   Week 2             1 1 1   Week 3                      1.5 1.5 1.5    - Reviewed potential side effects   - Detailed instructions provided in AVS  - Reviewed the importance of exercises, PD exercise resources provided in AVS  - Reviewed the importance of physical therapy, occupational therapy, speech therapy - referrals declined at this time  - EMG to evaluate neuropathy vs radiculopathy  - Neuropathy labs: Ha1c, vitamin B12, SPEP with immunofixation and TSH    Follow up in 6-8 weeks    Time Spent: 70 minutes spent on the date of the encounter doing chart review, history and exam, documentation and further activities as noted above    The longitudinal plan of care for the diagnosis(es)/condition(s) as documented were addressed during this visit. Due to the added complexity in care, I will continue to support Harrison in the subsequent management and with ongoing continuity of care.    Kate Carbajal MD  Movement Disorders Fellow

## 2024-09-24 ENCOUNTER — PATIENT OUTREACH (OUTPATIENT)
Dept: CARE COORDINATION | Facility: CLINIC | Age: 79
End: 2024-09-24
Payer: MEDICARE

## 2024-09-24 NOTE — PROGRESS NOTES
Clinic SW Care Coordination Contact  Follow Up Progress Note      Assessment: JARAD BEYER spoke with patient.  Today, he shared he was recently diagnosed with Parkinson's and Neuropathy. The Neurologist has started him on new medication.    He did receive the letter from his primary, for his .  They are trying to free up some finances ,so he can enter Assisted Living.   No progress on this yet, but he has toured a facility. He continues to live with his daughter , but the steps are difficult for his. NO recent falls.  He could not identify iliana new concerns or needs at this time.  Aware of Medicare Wellness visit. He does have a  PCP appt 10/15/24 and Pulmonologist on 10/11/24..    Care Gaps:    Health Maintenance Due   Topic Date Due    COPD ACTION PLAN  07/28/2018    PSA  12/03/2023    MEDICARE ANNUAL WELLNESS VISIT  10/13/2024    PHQ-9  10/13/2024           Care Plans  Care Plan: Community Resources       Problem: Insufficient In-home support       Priority: High    Note:     Goal Statement: Harrison will continue working with Care Coordination to ensure his needs are met for overall health and wellbeing.  Date Goal Set: 7/31/24  Barriers: Finances tied up in divorce proceedings  Strengths: Strong support system  Date to Achieve By: 7/31/25  Patient expressed understanding of goal: yes  Action steps to achieve this goal:  1. I will continue to follow up with medical team as needed  2. I will work with JARAD to document my need for MAGDA, and send documentation to attorneys in order to unfreeze some assets (divorce proceedings).  9/24 in process,  has letter)  3. Once funds are available, I will work with JARAD to find an agency to help locate an assisted living.  3. I will outreach to Care Coordination  for further questions or concerns          Goal: Establish adequate home support                             Intervention/Education provided during outreach: Introduction, Review of goals ,  discussed new diagnoses , living situation and reviewed goals.     Outreach Frequency: 2 weeks, more frequently as needed        Plan:Pt.  will continue to work with his  on freeing up finances so he can enter an Assisted Living facility  . Will keep scheduled appts    Care Coordinator will follow up in 1 month.       JUANA Putnam / ena [facility]zSt. [facility] HospitalJARAD  [facility]   Care Coordination  [facility]  9/24/2024 11:39 AM

## 2024-09-26 ENCOUNTER — TELEPHONE (OUTPATIENT)
Dept: FAMILY MEDICINE | Facility: CLINIC | Age: 79
End: 2024-09-26
Payer: MEDICARE

## 2024-09-26 NOTE — TELEPHONE ENCOUNTER
Home Care is calling regarding an established patient with [facility].        1/19/2024     1:54 PM   Home Care Information   Date of Home Care episode start 1/22/2024   Current following provider Dr. Dozier   Date provider agreed to follow 1/22/2024    Name/Phone Number Haydeeor ROGERS,[phone]   Home Care agency [facility]     Requesting orders from: Yaneli Dozier  Provider is following patient: Yes  Is this a 60-day recertification request?  Yes    Orders Requested    Physical Therapy  Request for recertification   Frequency:  1x/wk for 3 wks  Then every other week for 3 visits    Information was gathered and will be sent to provider for review.  RN will contact Home Care with information after provider review.  Confirmed ok to leave a detailed message with call back.  Contact information confirmed and updated as needed.    Celina Tolbert RN

## 2024-10-09 DIAGNOSIS — S32.000G LUMBAR COMPRESSION FRACTURE, WITH DELAYED HEALING, SUBSEQUENT ENCOUNTER: ICD-10-CM

## 2024-10-09 DIAGNOSIS — S32.592G: ICD-10-CM

## 2024-10-10 RX ORDER — OXYCODONE HYDROCHLORIDE 5 MG/1
5 TABLET ORAL EVERY 6 HOURS PRN
Qty: 120 TABLET | Refills: 0 | Status: SHIPPED | OUTPATIENT
Start: 2024-10-10 | End: 2024-11-07

## 2024-10-11 ENCOUNTER — OFFICE VISIT (OUTPATIENT)
Dept: PULMONOLOGY | Facility: CLINIC | Age: 79
End: 2024-10-11
Attending: INTERNAL MEDICINE
Payer: MEDICARE

## 2024-10-11 ENCOUNTER — TELEPHONE (OUTPATIENT)
Dept: PULMONOLOGY | Facility: CLINIC | Age: 79
End: 2024-10-11
Payer: MEDICARE

## 2024-10-11 VITALS — SYSTOLIC BLOOD PRESSURE: 137 MMHG | DIASTOLIC BLOOD PRESSURE: 74 MMHG | HEART RATE: 62 BPM | OXYGEN SATURATION: 93 %

## 2024-10-11 DIAGNOSIS — J70.1: ICD-10-CM

## 2024-10-11 DIAGNOSIS — G20.A1 PARKINSON'S DISEASE WITHOUT FLUCTUATING MANIFESTATIONS, UNSPECIFIED WHETHER DYSKINESIA PRESENT (H): ICD-10-CM

## 2024-10-11 DIAGNOSIS — J44.1 COPD EXACERBATION (H): ICD-10-CM

## 2024-10-11 DIAGNOSIS — J42 CHRONIC BRONCHITIS, UNSPECIFIED CHRONIC BRONCHITIS TYPE (H): Primary | ICD-10-CM

## 2024-10-11 DIAGNOSIS — J43.8 OTHER EMPHYSEMA (H): ICD-10-CM

## 2024-10-11 DIAGNOSIS — R91.8 MASS OF UPPER LOBE OF RIGHT LUNG: ICD-10-CM

## 2024-10-11 PROCEDURE — G0463 HOSPITAL OUTPT CLINIC VISIT: HCPCS | Performed by: INTERNAL MEDICINE

## 2024-10-11 PROCEDURE — 99215 OFFICE O/P EST HI 40 MIN: CPT | Performed by: INTERNAL MEDICINE

## 2024-10-11 RX ORDER — FLUTICASONE FUROATE, UMECLIDINIUM BROMIDE AND VILANTEROL TRIFENATATE 200; 62.5; 25 UG/1; UG/1; UG/1
1 POWDER RESPIRATORY (INHALATION) DAILY
Qty: 3 EACH | Refills: 11 | Status: SHIPPED | OUTPATIENT
Start: 2024-10-11 | End: 2024-10-11

## 2024-10-11 RX ORDER — FLUTICASONE FUROATE, UMECLIDINIUM BROMIDE AND VILANTEROL TRIFENATATE 200; 62.5; 25 UG/1; UG/1; UG/1
1 POWDER RESPIRATORY (INHALATION) DAILY
Qty: 3 EACH | Refills: 11 | Status: SHIPPED | OUTPATIENT
Start: 2024-10-11 | End: 2024-10-18

## 2024-10-11 RX ORDER — ALBUTEROL SULFATE 1.25 MG/3ML
1.25 SOLUTION RESPIRATORY (INHALATION) EVERY 4 HOURS PRN
Qty: 90 ML | Refills: 4 | Status: SHIPPED | OUTPATIENT
Start: 2024-10-11 | End: 2024-10-11

## 2024-10-11 RX ORDER — ALBUTEROL SULFATE 1.25 MG/3ML
1.25 SOLUTION RESPIRATORY (INHALATION) EVERY 4 HOURS PRN
Qty: 120 ML | Refills: 11 | Status: SHIPPED | OUTPATIENT
Start: 2024-10-11

## 2024-10-11 RX ORDER — ALBUTEROL SULFATE 90 UG/1
2 INHALANT RESPIRATORY (INHALATION) EVERY 4 HOURS PRN
Qty: 36 G | Refills: 11 | Status: SHIPPED | OUTPATIENT
Start: 2024-10-11

## 2024-10-11 ASSESSMENT — PAIN SCALES - GENERAL: PAINLEVEL: NO PAIN (0)
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
## 2024-10-11 NOTE — NURSING NOTE
Chief Complaint   Patient presents with    Follow Up     6 month follow up pulm.     Vitals were taken and medications were reconciled.    Arina Sesay RMA  10:41 AM

## 2024-10-11 NOTE — PATIENT INSTRUCTIONS
COPD  Pulmonary Nodules    Still very limited in activity primarily due to balance    Plan  Continue current meds  We are faxing in to [facility] refills for Trelegy high strength and Ventolin/albuterol inhaler  Also refilled albuterol nebulizer  Return to clinic 9 months - 7/2025  Call clinic for problems or questions [phone]  Has antibiotic and prednisone for COPD flare-ups   (3) adequate
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
## 2024-10-14 SDOH — HEALTH STABILITY: PHYSICAL HEALTH: ON AVERAGE, HOW MANY MINUTES DO YOU ENGAGE IN EXERCISE AT THIS LEVEL?: 0 MIN

## 2024-10-14 SDOH — HEALTH STABILITY: PHYSICAL HEALTH: ON AVERAGE, HOW MANY DAYS PER WEEK DO YOU ENGAGE IN MODERATE TO STRENUOUS EXERCISE (LIKE A BRISK WALK)?: 0 DAYS

## 2024-10-14 ASSESSMENT — ANXIETY QUESTIONNAIRES
4. TROUBLE RELAXING: MORE THAN HALF THE DAYS
GAD7 TOTAL SCORE: 11
7. FEELING AFRAID AS IF SOMETHING AWFUL MIGHT HAPPEN: SEVERAL DAYS
3. WORRYING TOO MUCH ABOUT DIFFERENT THINGS: MORE THAN HALF THE DAYS
7. FEELING AFRAID AS IF SOMETHING AWFUL MIGHT HAPPEN: SEVERAL DAYS
GAD7 TOTAL SCORE: 11
6. BECOMING EASILY ANNOYED OR IRRITABLE: SEVERAL DAYS
2. NOT BEING ABLE TO STOP OR CONTROL WORRYING: MORE THAN HALF THE DAYS
1. FEELING NERVOUS, ANXIOUS, OR ON EDGE: MORE THAN HALF THE DAYS
5. BEING SO RESTLESS THAT IT IS HARD TO SIT STILL: SEVERAL DAYS
8. IF YOU CHECKED OFF ANY PROBLEMS, HOW DIFFICULT HAVE THESE MADE IT FOR YOU TO DO YOUR WORK, TAKE CARE OF THINGS AT HOME, OR GET ALONG WITH OTHER PEOPLE?: SOMEWHAT DIFFICULT
IF YOU CHECKED OFF ANY PROBLEMS ON THIS QUESTIONNAIRE, HOW DIFFICULT HAVE THESE PROBLEMS MADE IT FOR YOU TO DO YOUR WORK, TAKE CARE OF THINGS AT HOME, OR GET ALONG WITH OTHER PEOPLE: SOMEWHAT DIFFICULT
GAD7 TOTAL SCORE: 11

## 2024-10-14 ASSESSMENT — SOCIAL DETERMINANTS OF HEALTH (SDOH): HOW OFTEN DO YOU GET TOGETHER WITH FRIENDS OR RELATIVES?: MORE THAN THREE TIMES A WEEK

## 2024-10-18 ENCOUNTER — OFFICE VISIT (OUTPATIENT)
Dept: FAMILY MEDICINE | Facility: CLINIC | Age: 79
End: 2024-10-18
Payer: MEDICARE

## 2024-10-18 ENCOUNTER — TELEPHONE (OUTPATIENT)
Dept: FAMILY MEDICINE | Facility: CLINIC | Age: 79
End: 2024-10-18

## 2024-10-18 VITALS
DIASTOLIC BLOOD PRESSURE: 70 MMHG | RESPIRATION RATE: 18 BRPM | HEART RATE: 89 BPM | SYSTOLIC BLOOD PRESSURE: 113 MMHG | WEIGHT: 156 LBS | OXYGEN SATURATION: 94 % | TEMPERATURE: 97.4 F | BODY MASS INDEX: 22.33 KG/M2 | HEIGHT: 70 IN

## 2024-10-18 DIAGNOSIS — I25.10 CORONARY ARTERY DISEASE INVOLVING NATIVE CORONARY ARTERY OF NATIVE HEART WITHOUT ANGINA PECTORIS: ICD-10-CM

## 2024-10-18 DIAGNOSIS — C79.9 METASTASIS FROM THYROID CANCER (H): ICD-10-CM

## 2024-10-18 DIAGNOSIS — E78.5 HYPERLIPIDEMIA LDL GOAL <100: ICD-10-CM

## 2024-10-18 DIAGNOSIS — J44.9 COPD, VERY SEVERE (H): ICD-10-CM

## 2024-10-18 DIAGNOSIS — G89.29 CHRONIC BILATERAL LOW BACK PAIN WITHOUT SCIATICA: ICD-10-CM

## 2024-10-18 DIAGNOSIS — Z00.00 ENCOUNTER FOR ANNUAL WELLNESS VISIT (AWV) IN MEDICARE PATIENT: Primary | ICD-10-CM

## 2024-10-18 DIAGNOSIS — H40.003 GLAUCOMA SUSPECT, BILATERAL: ICD-10-CM

## 2024-10-18 DIAGNOSIS — Z79.51 LONG TERM CURRENT USE OF INHALED STEROID: ICD-10-CM

## 2024-10-18 DIAGNOSIS — E89.0 POSTSURGICAL HYPOTHYROIDISM: ICD-10-CM

## 2024-10-18 DIAGNOSIS — R97.20 ELEVATED PROSTATE SPECIFIC ANTIGEN (PSA): ICD-10-CM

## 2024-10-18 DIAGNOSIS — E74.89 OTHER SPECIFIED DISORDERS OF CARBOHYDRATE METABOLISM (H): ICD-10-CM

## 2024-10-18 DIAGNOSIS — S32.000G LUMBAR COMPRESSION FRACTURE, WITH DELAYED HEALING, SUBSEQUENT ENCOUNTER: ICD-10-CM

## 2024-10-18 DIAGNOSIS — C73 METASTASIS FROM THYROID CANCER (H): ICD-10-CM

## 2024-10-18 DIAGNOSIS — M81.0 AGE-RELATED OSTEOPOROSIS WITHOUT CURRENT PATHOLOGICAL FRACTURE: ICD-10-CM

## 2024-10-18 DIAGNOSIS — Z92.29 HISTORY OF BISPHOSPHONATE THERAPY: ICD-10-CM

## 2024-10-18 DIAGNOSIS — M54.50 CHRONIC BILATERAL LOW BACK PAIN WITHOUT SCIATICA: ICD-10-CM

## 2024-10-18 DIAGNOSIS — I10 ESSENTIAL HYPERTENSION, BENIGN: ICD-10-CM

## 2024-10-18 DIAGNOSIS — E03.8 OTHER SPECIFIED HYPOTHYROIDISM: ICD-10-CM

## 2024-10-18 DIAGNOSIS — G62.9 NEUROPATHY: ICD-10-CM

## 2024-10-18 DIAGNOSIS — I10 BENIGN ESSENTIAL HYPERTENSION: ICD-10-CM

## 2024-10-18 DIAGNOSIS — G47.33 OBSTRUCTIVE SLEEP APNEA: ICD-10-CM

## 2024-10-18 DIAGNOSIS — G20.A1 PARKINSON'S DISEASE WITHOUT DYSKINESIA OR FLUCTUATING MANIFESTATIONS (H): ICD-10-CM

## 2024-10-18 DIAGNOSIS — Z87.81 HISTORY OF HIP FRACTURE: ICD-10-CM

## 2024-10-18 LAB
EST. AVERAGE GLUCOSE BLD GHB EST-MCNC: 105 MG/DL
HBA1C MFR BLD: 5.3 % (ref 0–5.6)
PSA SERPL DL<=0.01 NG/ML-MCNC: 0.45 NG/ML (ref 0–6.5)
TOTAL PROTEIN SERUM FOR ELP: 6.7 G/DL (ref 6.4–8.3)
TSH SERPL DL<=0.005 MIU/L-ACNC: 0.47 UIU/ML (ref 0.3–4.2)
VIT B12 SERPL-MCNC: 616 PG/ML (ref 232–1245)

## 2024-10-18 PROCEDURE — 86334 IMMUNOFIX E-PHORESIS SERUM: CPT | Performed by: PATHOLOGY

## 2024-10-18 PROCEDURE — 82607 VITAMIN B-12: CPT | Performed by: INTERNAL MEDICINE

## 2024-10-18 PROCEDURE — 99214 OFFICE O/P EST MOD 30 MIN: CPT | Mod: 25 | Performed by: INTERNAL MEDICINE

## 2024-10-18 PROCEDURE — 84165 PROTEIN E-PHORESIS SERUM: CPT | Performed by: PATHOLOGY

## 2024-10-18 PROCEDURE — 83036 HEMOGLOBIN GLYCOSYLATED A1C: CPT | Performed by: INTERNAL MEDICINE

## 2024-10-18 PROCEDURE — 84443 ASSAY THYROID STIM HORMONE: CPT | Performed by: INTERNAL MEDICINE

## 2024-10-18 PROCEDURE — 36415 COLL VENOUS BLD VENIPUNCTURE: CPT | Performed by: INTERNAL MEDICINE

## 2024-10-18 PROCEDURE — 99000 SPECIMEN HANDLING OFFICE-LAB: CPT | Performed by: INTERNAL MEDICINE

## 2024-10-18 PROCEDURE — 84153 ASSAY OF PSA TOTAL: CPT | Performed by: INTERNAL MEDICINE

## 2024-10-18 PROCEDURE — G0439 PPPS, SUBSEQ VISIT: HCPCS | Performed by: INTERNAL MEDICINE

## 2024-10-18 PROCEDURE — 84439 ASSAY OF FREE THYROXINE: CPT | Mod: 90 | Performed by: INTERNAL MEDICINE

## 2024-10-18 PROCEDURE — 84155 ASSAY OF PROTEIN SERUM: CPT | Performed by: INTERNAL MEDICINE

## 2024-10-18 RX ORDER — LEVOTHYROXINE SODIUM 125 UG/1
125 TABLET ORAL DAILY
Qty: 90 TABLET | Refills: 1 | Status: SHIPPED | OUTPATIENT
Start: 2024-10-18 | End: 2024-10-26

## 2024-10-18 RX ORDER — AMLODIPINE BESYLATE 5 MG/1
5 TABLET ORAL AT BEDTIME
Qty: 90 TABLET | Refills: 3 | Status: SHIPPED | OUTPATIENT
Start: 2024-10-18

## 2024-10-18 ASSESSMENT — PATIENT HEALTH QUESTIONNAIRE - PHQ9
10. IF YOU CHECKED OFF ANY PROBLEMS, HOW DIFFICULT HAVE THESE PROBLEMS MADE IT FOR YOU TO DO YOUR WORK, TAKE CARE OF THINGS AT HOME, OR GET ALONG WITH OTHER PEOPLE: SOMEWHAT DIFFICULT
SUM OF ALL RESPONSES TO PHQ QUESTIONS 1-9: 4
SUM OF ALL RESPONSES TO PHQ QUESTIONS 1-9: 4

## 2024-10-18 ASSESSMENT — PAIN SCALES - GENERAL: PAINLEVEL: MODERATE PAIN (5)

## 2024-10-18 NOTE — TELEPHONE ENCOUNTER
Patient given message below. Patient scheduled at [place] as requested by the patient for Prolia injection for Friday 10/25. Silvia Durham RN

## 2024-10-18 NOTE — TELEPHONE ENCOUNTER
Pt in for OV today     PCP ordered Prolia     Would be patient's first injection, has never taken this before     Called [employer] finance team to run insurance verification - they will look into insurance verification and contact triage staff once approved     Pt then reported he is currently on Fosamax and took a dose yesterday. PCP advised pt has to off that medication for a full week before he can start Prolia     Pt asked that we call him once Prolia verification is approved to set up his first nurse only appointment for this     Zaria HAYS Triage RN  [facility]

## 2024-10-18 NOTE — PROGRESS NOTES
The following steps were completed to comply with the [employer] program for Prolia:  Reviewed the serious risks of Prolia  and the symptoms of each risk.  Advised patient to seek prompt medical attention if they have signs or symptoms of any of the serious risks.  Patient will be provided a copy of the Medication Guide and Patient Brochure prior to first injection.    Yaneli Dozier MD
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
## 2024-10-18 NOTE — TELEPHONE ENCOUNTER
Patient Contact    Attempt # 1    Was call answered?  No.  Left message on voicemail with information to call back.    On call back, please assist patient with scheduling nurse only visit on RN schedule for Prolia - *pt states he took his last Fosamax dose yesterday, must be off Fosamax a full week before getting prolia     Zaria HAYS, Triage RN  [facility]

## 2024-10-18 NOTE — TELEPHONE ENCOUNTER
Tana Menon Laura B, RN Hello,    Patient is good to go to receive injection.    Thank you,    Tana Taylor

## 2024-10-18 NOTE — PATIENT INSTRUCTIONS
You have selected the below site for your Prolia injection. If you did not schedule this appointment in clinic, please call the number listed below.  Ros DAILEY) [phone]

## 2024-10-21 LAB
ALBUMIN SERPL ELPH-MCNC: 4.2 G/DL (ref 3.7–5.1)
ALPHA1 GLOB SERPL ELPH-MCNC: 0.3 G/DL (ref 0.2–0.4)
ALPHA2 GLOB SERPL ELPH-MCNC: 0.8 G/DL (ref 0.5–0.9)
B-GLOBULIN SERPL ELPH-MCNC: 0.8 G/DL (ref 0.6–1)
GAMMA GLOB SERPL ELPH-MCNC: 0.6 G/DL (ref 0.7–1.6)
LOCATION OF TASK: ABNORMAL
LOCATION OF TASK: NORMAL
M PROTEIN SERPL ELPH-MCNC: 0 G/DL
PROT PATTERN SERPL ELPH-IMP: ABNORMAL
PROT PATTERN SERPL IFE-IMP: NORMAL

## 2024-10-23 LAB — T4 FREE SERPL DIALY-MCNC: 3.2 NG/DL

## 2024-10-23 NOTE — TELEPHONE ENCOUNTER
Noted that this patient is scheduled for Prolia injection on 10/25/24 at [facility].  Please ensure that insurance verification is completed and approved for injection to be completed at [place] prior to appointment  The current approval still says [facility]    Vance Quintana RN  [facility]

## 2024-10-23 NOTE — PROGRESS NOTES
Palliative Care Outpatient Clinic    Patient ID:  Medical - 79 year old male with PMHx COPD, metastatic thyroid CA, CAD, RUL lung mass s/p RT with possible RT pneumonitis and RA   - recently multiple falls with right femoral neck fracture, rib fractures and subarachnoid hemorrhage, status post right total hip replacement and prolonged hospitalization for COPD exacerbation with pneumonia and rehabilitation. He has had a nondisplaced fracture left pubic rami as well along with the recent diagnosis of parkinsonism and very recent glaucoma.   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 Started on Sinemet, follow up in movement disorders clinic (Dr. Carbajal). Tremor started 1.5 years ago potentially    Social -   Moved in with Elvira shah. Sister-sometimes, lives in [place]. Son-lives in [place]. Getting  from his spouse, Adeola.   Watches TV, play [employer], read the news, go to drs appointments  Used to enjoy going out, garden, yard work.     Care Planning -   [facility] dated 3/13/2023, names his spouse Adeola Thomas as his HCA. Very clear wishes about not wanting LST in situation of being bed ridden, wheel chair bound, not being productive.   POLST completed 4/29/2024, DNR/DNI  Had conversation with PCP on 10/18/24 detailing hospice vs palliative care    History:  History gathered today from: patient, family/loved ones, medical chart    Patient has expressed feeling negative and depressed about his ongoing medical issues, feeling overwhelmed.     Ambulation: Finding more things that are going wrong. Dealing with PD, neuropathy, glaucoma. Not feeling much different with Sinemet, no side effects. More of a balance issue. Can't walk like he wants to. Harder to start walking, feet are turned out, left one. Hard to lift them up, mostly on the  "left. Walks with cane, hasn't looked into orthotics. Sometimes feels dizzy, lightheaded when walking, feels it later in the day. Both early and later from onset of walking. Uses a walker to help stabilize, using it more. No recent falls. Stairs are hard, up and down. Falls usually to the left or the right, more balance, can't catch himself. Body follows if he tries to reach for something. Living with dtrs house. Shower is downstairs, 14 steps, nerve wracking. 4 steps in or out. Sleeping on the main level.     Neuropathy-numbness, worsens after being up for awhile. Loses feeling, mostly in left foot. Just bottoms of his feet. Pending some lab tests, EMG/NCS. Gabapentin doesn't help as much    Breathing-not on oxygen now, getting worse. Feels subjectively short of breath. Worse with exertion and at rest. Uses oxygen at home, wears at night and sometimes during the day.     Pain: back pain. Had MRI spine. PRN oxycodone 5 mg, 2-3x a day. No side effects. Filled by PCP    Sleep: usually don't sleep well, get up at 4am. Bedtime 10-11pm, 12am. Sleeping in the living room. Sometimes trouble falling asleep. Haven't taken trazodone since hospitalization, felt too out of it. Sometimes wakes up due to nocturia. No breathing issues with sleep. Dtr says he sleeps well. Sometimes nap during the day.     Mood: Low energy all the time. When asked about coping, he says that \"I just feel like dying. I can't do anything, so what's the point.\" He doesn't like feeling like a burden to his family, feels like his dtr is getting sick of him at home. Dtr says that his living situation is potentially temporary, pending his divorce finalizing. They have talked about him moving into the basement, which is a full unit, but pt doesn't like it down there. They have talked about putting in a lift to avoid stairs if he were to live there. He hasn't thought about living on his own, not sure if he can because he can't cook meals on his own anymore. " Still driving but becoming harder. He is not sure how he would like living in an assisted living type of situation. Hasn't looked into those yet.     Medications:  APAP 1000 mg TID  Albuterol neb Q4H PRN  Albuterol inhaler 2 puffs Q6H PRN  Sinemet  1.5 tabs TID  Trelegy 1 puff qAM  Gabapentin 600 mg TID  Melatonin 10 mg at bedtime PRN  Methocarbamol 500 mg QID  Oxycodone 5 mg Q6H PRN  Prednisone 2 mg daily for COPD  Senokot 1 BID  Trazodone 50 mg QHS    PE: There were no vitals taken for this visit.   Wt Readings from Last 3 Encounters:   10/18/24 70.8 kg (156 lb)   09/23/24 70.9 kg (156 lb 6 oz)   09/20/24 70.8 kg (156 lb)     Gen: alert, conversant  Resp: somewhat breathless at times when he is anxious, normal effort otherwise, no supplementary oxygen  Ext: no swelling noted  Neuro: no resting tremor noted, bounces leg up and down likely due to anxiety    Data reviewed:  I reviewed recent labs and imaging, my comments:     database reviewed: y    Impression & Recommendations:  79 year old male with PMHx COPD, metastatic thyroid CA, CAD, RUL lung mass s/p RT with possible RT pneumonitis and RA with recent multiple falls resulting in multiple rib, pelvic,and hip fractures, neuropathy, and recent diagnosis of PD.     Patient is struggling the most with demoralization around his frailty and limited mobility.     Recommendations:  - discussed reaching out to neurology to discuss adjusting or changing his Sinemet, as he has been on it for 3 weeks with no change. Encouraged him to keep following up with neurology to look for reversible causes of his neuropathy and other improvements in his gait  - patient would benefit from psychosocial support regarding his demoralization and statements of wishing to die. He denies active SI or thoughts of harming himself today, says it comes from feeling like this all sucks. He does not want to talk to a therapist at this time.   - recommend SW to discuss home resources to  help improve functionality and independence, can also assist with housing options as his divorce finalizes. Dtr open to discussion as well  - patient does not have any qualifying criteria for hospice at this time. He has a HCD in place, has POLST outlining DNR/DNI, no other limits can be offered at this time  - other medications per PCP, neurology, and other specialists    RTC in 6 months, can reschedule sooner if needed    Tatianna Mar MD  Fellow, Palliative Medicine    Attending Note:  Patient seen and evaluated with Dr Mar and I agree with/confirm their findings/recs in this note. I personally spent over 60 minutes on the date of service in various clinical activities associated with this patient's care.  The longitudinal plan of care for the diagnosis(es)/condition(s) as documented were addressed during this visit. Due to the added complexity in care, I will continue to support Harrison in the subsequent management and with ongoing continuity of care.    Thank you for involving us in the patient's care.   Stiven Altman MD / Palliative Medicine / Text me via [facility].

## 2024-10-24 ENCOUNTER — OFFICE VISIT (OUTPATIENT)
Dept: PALLIATIVE CARE | Facility: CLINIC | Age: 79
End: 2024-10-24
Attending: INTERNAL MEDICINE
Payer: MEDICARE

## 2024-10-24 VITALS
SYSTOLIC BLOOD PRESSURE: 145 MMHG | HEIGHT: 69 IN | TEMPERATURE: 98.4 F | RESPIRATION RATE: 16 BRPM | BODY MASS INDEX: 23.34 KG/M2 | OXYGEN SATURATION: 95 % | HEART RATE: 64 BPM | DIASTOLIC BLOOD PRESSURE: 73 MMHG | WEIGHT: 157.6 LBS

## 2024-10-24 DIAGNOSIS — J44.9 COPD, VERY SEVERE (H): Primary | ICD-10-CM

## 2024-10-24 DIAGNOSIS — G20.A1 PARKINSON'S DISEASE WITHOUT DYSKINESIA OR FLUCTUATING MANIFESTATIONS (H): ICD-10-CM

## 2024-10-24 DIAGNOSIS — R45.86 MOOD AND AFFECT DISTURBANCE: ICD-10-CM

## 2024-10-24 DIAGNOSIS — Z71.89 ADVANCE CARE PLANNING: ICD-10-CM

## 2024-10-24 PROCEDURE — 99205 OFFICE O/P NEW HI 60 MIN: CPT | Performed by: STUDENT IN AN ORGANIZED HEALTH CARE EDUCATION/TRAINING PROGRAM

## 2024-10-24 PROCEDURE — G0463 HOSPITAL OUTPT CLINIC VISIT: HCPCS | Performed by: STUDENT IN AN ORGANIZED HEALTH CARE EDUCATION/TRAINING PROGRAM

## 2024-10-24 PROCEDURE — G2211 COMPLEX E/M VISIT ADD ON: HCPCS | Performed by: STUDENT IN AN ORGANIZED HEALTH CARE EDUCATION/TRAINING PROGRAM

## 2024-10-24 RX ORDER — FOLIC ACID/MULTIVIT,IRON,MINER 0.4MG-18MG
1 TABLET ORAL
COMMUNITY

## 2024-10-24 ASSESSMENT — PAIN SCALES - GENERAL: PAINLEVEL_OUTOF10: SEVERE PAIN (6)

## 2024-10-24 NOTE — NURSING NOTE
"Oncology Rooming Note    October 24, 2024 3:37 PM   Harrison Thomas is a 79 year old male who presents for:    No chief complaint on file.    Initial Vitals: BP (!) 145/73 (BP Location: Right arm, Patient Position: Sitting, Cuff Size: Adult Regular)   Pulse 64   Temp 98.4  F (36.9  C) (Oral)   Resp 16   Ht 1.74 m (5' 8.5\")   Wt 71.5 kg (157 lb 9.6 oz)   SpO2 95%   BMI 23.61 kg/m   Estimated body mass index is 23.61 kg/m  as calculated from the following:    Height as of this encounter: 1.74 m (5' 8.5\").    Weight as of this encounter: 71.5 kg (157 lb 9.6 oz). Body surface area is 1.86 meters squared.  Severe Pain (6) Comment: Data Unavailable   No LMP for male patient.  Allergies reviewed: Yes  Medications reviewed: Yes    Medications: Medication refills not needed today.  Pharmacy name entered into [employer]:    [id number] - [facility] [address] [place] [address]  [id number] - [facility] [address] [facility] [address] [facility]    Frailty Screening:   Is the patient here for a new oncology consult visit in cancer care? 1. Yes. Over the past month, have you experienced difficulty or required a caregiver to assist with:   1. Balance, walking or general mobility (including any falls)? YES  2. Completion of self-care tasks such as bathing, dressing, toileting, grooming/hygiene?  YES  3. Concentration or memory that affects your daily life?  NO       Clinical concerns: none.       Wilner Dean              "

## 2024-10-24 NOTE — LETTER
10/24/2024       RE: Harrison Thomas  [id number] [facility] [id number]     Dear Colleague,    Thank you [facility] referring your patient, Harrison Thomas[facility]. Please see a copy of my visit note below.    Palliative Care Outpatient Clinic    Patient ID:  Medical - 79 year old male with PMHx COPD, metastatic thyroid CA, CAD, RUL lung mass s/p RT with possible RT pneumonitis and RA   - recently multiple falls with right femoral neck fracture, rib fractures and subarachnoid hemorrhage, status post right total hip replacement and prolonged hospitalization for COPD exacerbation with pneumonia and rehabilitation. He has had a nondisplaced fracture left pubic rami as well along with the recent diagnosis of parkinsonism and very recent glaucoma.   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 Started on Sinemet, follow up in movement disorders clinic (Dr. Carbajal). Tremor started 1.5 years ago potentially    Social -   Moved in with Elvira shah. Sister-sometimes, lives in [place]. Son-lives in [place]. Getting  from his spouse, Adeola.   Watches TV, play [employer], read the news, go to drs appointments  Used to enjoy going out, garden, yard work.     Care Planning -   [facility] dated 3/13/2023, names his spouse Adeola Thomas as his HCA. Very clear wishes about not wanting LST in situation of being bed ridden, wheel chair bound, not being productive.   POLST completed 4/29/2024, DNR/DNI  Had conversation with PCP on 10/18/24 detailing hospice vs palliative care    History:  History gathered today from: patient, family/loved ones, medical chart    Patient has expressed feeling negative and depressed about his ongoing medical issues, feeling  "overwhelmed.     Ambulation: Finding more things that are going wrong. Dealing with PD, neuropathy, glaucoma. Not feeling much different with Sinemet, no side effects. More of a balance issue. Can't walk like he wants to. Harder to start walking, feet are turned out, left one. Hard to lift them up, mostly on the left. Walks with cane, hasn't looked into orthotics. Sometimes feels dizzy, lightheaded when walking, feels it later in the day. Both early and later from onset of walking. Uses a walker to help stabilize, using it more. No recent falls. Stairs are hard, up and down. Falls usually to the left or the right, more balance, can't catch himself. Body follows if he tries to reach for something. Living with dtrs house. Shower is downstairs, 14 steps, nerve wracking. 4 steps in or out. Sleeping on the main level.     Neuropathy-numbness, worsens after being up for awhile. Loses feeling, mostly in left foot. Just bottoms of his feet. Pending some lab tests, EMG/NCS. Gabapentin doesn't help as much    Breathing-not on oxygen now, getting worse. Feels subjectively short of breath. Worse with exertion and at rest. Uses oxygen at home, wears at night and sometimes during the day.     Pain: back pain. Had MRI spine. PRN oxycodone 5 mg, 2-3x a day. No side effects. Filled by PCP    Sleep: usually don't sleep well, get up at 4am. Bedtime 10-11pm, 12am. Sleeping in the living room. Sometimes trouble falling asleep. Haven't taken trazodone since hospitalization, felt too out of it. Sometimes wakes up due to nocturia. No breathing issues with sleep. Dtr says he sleeps well. Sometimes nap during the day.     Mood: Low energy all the time. When asked about coping, he says that \"I just feel like dying. I can't do anything, so what's the point.\" He doesn't like feeling like a burden to his family, feels like his dtr is getting sick of him at home. Dtr says that his living situation is potentially temporary, pending his divorce " finalizing. They have talked about him moving into the basement, which is a full unit, but pt doesn't like it down there. They have talked about putting in a lift to avoid stairs if he were to live there. He hasn't thought about living on his own, not sure if he can because he can't cook meals on his own anymore. Still driving but becoming harder. He is not sure how he would like living in an assisted living type of situation. Hasn't looked into those yet.     Medications:  APAP 1000 mg TID  Albuterol neb Q4H PRN  Albuterol inhaler 2 puffs Q6H PRN  Sinemet  1.5 tabs TID  Trelegy 1 puff qAM  Gabapentin 600 mg TID  Melatonin 10 mg at bedtime PRN  Methocarbamol 500 mg QID  Oxycodone 5 mg Q6H PRN  Prednisone 2 mg daily for COPD  Senokot 1 BID  Trazodone 50 mg QHS    PE: There were no vitals taken for this visit.   Wt Readings from Last 3 Encounters:   10/18/24 70.8 kg (156 lb)   09/23/24 70.9 kg (156 lb 6 oz)   09/20/24 70.8 kg (156 lb)     Gen: alert, conversant  Resp: somewhat breathless at times when he is anxious, normal effort otherwise, no supplementary oxygen  Ext: no swelling noted  Neuro: no resting tremor noted, bounces leg up and down likely due to anxiety    Data reviewed:  I reviewed recent labs and imaging, my comments:     database reviewed: y    Impression & Recommendations:  79 year old male with PMHx COPD, metastatic thyroid CA, CAD, RUL lung mass s/p RT with possible RT pneumonitis and RA with recent multiple falls resulting in multiple rib, pelvic,and hip fractures, neuropathy, and recent diagnosis of PD.     Patient is struggling the most with demoralization around his frailty and limited mobility.     Recommendations:  - discussed reaching out to neurology to discuss adjusting or changing his Sinemet, as he has been on it for 3 weeks with no change. Encouraged him to keep following up with neurology to look for reversible causes of his neuropathy and other improvements in his gait  -  patient would benefit from psychosocial support regarding his demoralization and statements of wishing to die. He denies active SI or thoughts of harming himself today, says it comes from feeling like this all sucks. He does not want to talk to a therapist at this time.   - recommend SW to discuss home resources to help improve functionality and independence, can also assist with housing options as his divorce finalizes. Dtr open to discussion as well  - patient does not have any qualifying criteria for hospice at this time. He has a HCD in place, has POLST outlining DNR/DNI, no other limits can be offered at this time  - other medications per PCP, neurology, and other specialists    RTC in 6 months, can reschedule sooner if needed    Tatianna Mar MD  Fellow, Palliative Medicine    Attending Note:  Patient seen and evaluated with Dr Mar and I agree with/confirm their findings/recs in this note. I personally spent over 60 minutes on the date of service in various clinical activities associated with this patient's care.  The longitudinal plan of care for the diagnosis(es)/condition(s) as documented were addressed during this visit. Due to the added complexity in care, I will continue to support Harrison in the subsequent management and with ongoing continuity of care.    Thank you for involving us in the patient's care.   Stiven Altman MD / Palliative Medicine / Text me via [facility].      Again, thank you for allowing me to participate in the care of your patient.      Sincerely,    Tatianna Mar MD

## 2024-10-24 NOTE — PROVIDER NOTIFICATION
Problem: METABOLIC, FLUID AND ELECTROLYTES - ADULT  Goal: Electrolytes maintained within normal limits  Description: INTERVENTIONS:  - Monitor labs and assess patient for signs and symptoms of electrolyte imbalances  - Administer electrolyte replacement as ordered  - Monitor response to electrolyte replacements, including repeat lab results as appropriate  - Instruct patient on fluid and nutrition as appropriate  Outcome: Progressing  Goal: Fluid balance maintained  Description: INTERVENTIONS:  - Monitor labs   - Monitor I/O and WT  - Instruct patient on fluid and nutrition as appropriate  - Assess for signs & symptoms of volume excess or deficit  Outcome: Progressing  Goal: Glucose maintained within target range  Description: INTERVENTIONS:  - Monitor Blood Glucose as ordered  - Assess for signs and symptoms of hyperglycemia and hypoglycemia  - Administer ordered medications to maintain glucose within target range  - Assess nutritional intake and initiate nutrition service referral as needed  Outcome: Progressing     Problem: GASTROINTESTINAL - ADULT  Goal: Minimal or absence of nausea and/or vomiting  Description: INTERVENTIONS:  - Administer IV fluids if ordered to ensure adequate hydration  - Maintain NPO status until nausea and vomiting are resolved  - Nasogastric tube if ordered  - Administer ordered antiemetic medications as needed  - Provide nonpharmacologic comfort measures as appropriate  - Advance diet as tolerated, if ordered  - Consider nutrition services referral to assist patient with adequate nutrition and appropriate food choices  Reactivated  Goal: Maintains or returns to baseline bowel function  Description: INTERVENTIONS:  - Assess bowel function  - Encourage oral fluids to ensure adequate hydration  - Administer IV fluids if ordered to ensure adequate hydration  - Administer ordered medications as needed  - Encourage mobilization and activity  - Consider nutritional services referral to assist  Goal Outcome Evaluation:    MD notification  Notified person Name: Scott Clark MD  Notification date/time: 6/15/23 0430  Notification Interaction: phone  Purpose Notification: Rib Fracture alert - Signs of decompensation ([facility] #[id number]). VSS, pt was up to bathroom but no void. Bladder scanned >780ml, straight cath output 950ml @0200. pt currently resting. Please advice.  [id number]  Received Call back: updated, pt in no  respiratory distress, pain controlled with medications. Coughing and deep breathing, and Incentive spirometer done when awake. Per MD, pt appeared to be stable, continue to monitor         MD also paged regarding high BG reading, pt stated he had bunch of candy before BG check, per MD continue to monitor and recheck in Am       patient with adequate nutrition and appropriate food choices  Reactivated  Goal: Maintains adequate nutritional intake  Description: INTERVENTIONS:  - Monitor percentage of each meal consumed  - Identify factors contributing to decreased intake, treat as appropriate  - Assist with meals as needed  - Monitor I&O, weight, and lab values if indicated  - Obtain nutrition services referral as needed  Reactivated  Goal: Establish and maintain optimal ostomy function  Description: INTERVENTIONS:  - Assess bowel function  - Encourage oral fluids to ensure adequate hydration  - Administer IV fluids if ordered to ensure adequate hydration   - Administer ordered medications as needed  - Encourage mobilization and activity  - Nutrition services referral to assist patient with appropriate food choices  - Assess stoma site  - Consider wound care consult   Reactivated  Goal: Oral mucous membranes remain intact  Description: INTERVENTIONS  - Assess oral mucosa and hygiene practices  - Implement preventative oral hygiene regimen  - Implement oral medicated treatments as ordered  - Initiate Nutrition services referral as needed  Reactivated     Problem: Alteration in Orientation  Goal: Treatment Goal: Demonstrate a reduction of confusion and improved orientation to person, place, time and/or situation upon discharge, according to optimum baseline/ability  Outcome: Progressing  Goal: Interact with staff daily  Description: Interventions:  - Assess and re-assess patient's level of orientation  - Engage patient in 1 on 1 interactions, daily, for a minimum of 15 minutes   - Establish rapport/trust with patient   Outcome: Progressing  Goal: Express concerns related to confused thinking related to:  Description: Interventions:  - Encourage patient to express feelings, fears, frustrations, hopes  - Assign consistent caregivers   - [place]/re-orient patient as needed  - Allow comfort items, as appropriate  - Provide visual cues, signs, etc.    Outcome: Progressing  Goal: Allow medical examinations, as recommended  Description: Interventions:  - Provide physical/neurological exams and/or referrals, per provider   Outcome: Progressing  Goal: Cooperate with recommended testing/procedures  Description: Interventions:  - Determine need for ancillary testing  - Observe for mental status changes  - Implement falls/precaution protocol   Outcome: Progressing  Goal: Attend and participate in unit activities, including therapeutic, recreational, and educational groups  Description: Interventions:  - Provide therapeutic and educational activities daily, encourage attendance and participation, and document same in the medical record   - Provide appropriate opportunities for reminiscence   - Provide a consistent daily routine   - Encourage family contact/visitation   Outcome: Progressing  Goal: Complete daily ADLs, including personal hygiene independently, as able  Description: Interventions:  - Observe, teach, and assist patient with ADLS  Outcome: Progressing     Problem: INFECTION - ADULT  Goal: Absence or prevention of progression during hospitalization  Description: INTERVENTIONS:  - Assess and monitor for signs and symptoms of infection  - Monitor lab/diagnostic results  - Monitor all insertion sites, i.e. indwelling lines, tubes, and drains  - Monitor endotracheal if appropriate and nasal secretions for changes in amount and color  - [place] appropriate cooling/warming therapies per order  - Administer medications as ordered  - Instruct and encourage patient and family to use good hand hygiene technique  - Identify and instruct in appropriate isolation precautions for identified infection/condition  Outcome: Progressing  Goal: Absence of fever/infection during neutropenic period  Description: INTERVENTIONS:  - Monitor WBC    Outcome: Progressing     Problem: PAIN - ADULT  Goal: Verbalizes/displays adequate comfort level or baseline comfort  level  Description: Interventions:  - Encourage patient to monitor pain and request assistance  - Assess pain using appropriate pain scale  - Administer analgesics based on type and severity of pain and evaluate response  - Implement non-pharmacological measures as appropriate and evaluate response  - Consider cultural and social influences on pain and pain management  - Notify physician/advanced practitioner if interventions unsuccessful or patient reports new pain  Outcome: Progressing

## 2024-10-24 NOTE — PATIENT INSTRUCTIONS
Social work will give you a call in the next few days.   We will plan a follow up in 6 months, call sooner if you need an earlier appointment.

## 2024-10-25 ENCOUNTER — ALLIED HEALTH/NURSE VISIT (OUTPATIENT)
Dept: FAMILY MEDICINE | Facility: CLINIC | Age: 79
End: 2024-10-25
Payer: MEDICARE

## 2024-10-25 ENCOUNTER — PATIENT OUTREACH (OUTPATIENT)
Dept: CARE COORDINATION | Facility: CLINIC | Age: 79
End: 2024-10-25

## 2024-10-25 DIAGNOSIS — M81.0 AGE-RELATED OSTEOPOROSIS WITHOUT CURRENT PATHOLOGICAL FRACTURE: Primary | ICD-10-CM

## 2024-10-25 PROCEDURE — 99207 PR NO CHARGE NURSE ONLY: CPT

## 2024-10-25 PROCEDURE — 96372 THER/PROPH/DIAG INJ SC/IM: CPT | Performed by: INTERNAL MEDICINE

## 2024-10-25 NOTE — PATIENT INSTRUCTIONS
You received your Prolia injection today  Your next Injection is due in 6 months (around 04/25/2025)  If you plan on having any dental work done within the next 6 months, please let your dentist know that you are on this medication.  Make sure you do not have any dental work completed involving the jaw bone within 2 month prior to your scheduled injection

## 2024-10-25 NOTE — PROGRESS NOTES
Social Work Telephone Message Note  [facility]     Patient Name:  Harrison Thomas  /Age:  1945 (79 year old)    Referral Source: Dr Mar - palliative care  Reason for Referral:  housing     attempted to contact Patient via telephone on 10/25/24. Sw had been consulted to connect with patient regarding housing and finances. Left a message providing writer contact information encouraging a return call when able.  will await Patient's return phone call and will provide assistance at that time.          KINA Garza, [facility]    [facility]  [phone]  [email or web address]

## 2024-10-25 NOTE — PROGRESS NOTES
Clinic Administered Medication Documentation      Prolia Documentation    Indication: Prolia  (denosumab) is a prescription medicine used to treat osteoporosis in patients who:   Are at high risk for fracture, meaning patients who have had a fracture related to osteoporosis, or who have multiple risk factors for fracture.  Cannot use another osteoporosis medicine or other osteoporosis medicines did not work well.  The timeline for early/late injections would be 4 weeks early and any time after the 6 month katerin. If a patient receives their injection late, then the subsequent injection would be 6 months from the date that they actually received the injection.    When was the last injection?  First injection  Was the last injection at least 6 months ago? Yes  Has the prior authorization been completed?  Yes  Is there an active order (written within the past 365 days, with administrations remaining, not ) in the chart?  Yes   GFR Estimate   Date Value Ref Range Status   2024 86 >60 mL/min/1.73m2 Final     Comment:     eGFR calculated using  CKD-EPI equation.   2021 84 >60 mL/min/[1.73_m2] Final     Comment:     Non  GFR Calc  Starting 2018, serum creatinine based estimated GFR (eGFR) will be   calculated using the Chronic Kidney Disease Epidemiology Collaboration   (CKD-EPI) equation.       Has patient had a GFR within the last 12 months? Yes   Is GFR under 30, or patient has a diagnosis of CKD4 or CKD5? No   Patient denies gastric bypass or parathyroid surgery in past 6 months? Yes - patient denies.   Patient denies dental work in the past two months involving drilling into the bone, such as implants/extractions, oral surgery or a tooth extraction that has not healed yet?  Yes  Patient denies plans for an emergency tooth extraction within the next week? Yes    The following steps were completed to comply with the REMS program for Prolia:  Reviewed information in the  Medication Guide, including the serious risks of Prolia  and the symptoms of each risk.  Advised patient to seek prompt medical attention if they have signs or symptoms of any of the serious risks.  Provided each patient a copy of the Medication Guide and Patient Guide.    Prior to injection, verified patient identity using patient's name and date of birth. Medication was administered. Please see MAR and medication order for additional information. Patient instructed to remain in clinic for 15 minutes and report any adverse reaction to staff immediately.    Vial/Syringe: Single dose vial. Was entire vial of medication used? Yes  Was this medication supplied by the patient? No    Pinky Guevara RN

## 2024-10-26 DIAGNOSIS — E89.0 POSTSURGICAL HYPOTHYROIDISM: ICD-10-CM

## 2024-10-26 DIAGNOSIS — C73 MALIGNANT NEOPLASM OF THYROID GLAND (H): Primary | ICD-10-CM

## 2024-10-26 RX ORDER — LEVOTHYROXINE SODIUM 112 UG/1
112 TABLET ORAL DAILY
Qty: 90 TABLET | Refills: 1 | Status: SHIPPED | OUTPATIENT
Start: 2024-10-26

## 2024-10-29 ENCOUNTER — TRANSFERRED RECORDS (OUTPATIENT)
Dept: HEALTH INFORMATION MANAGEMENT | Facility: CLINIC | Age: 79
End: 2024-10-29
Payer: MEDICARE

## 2024-10-29 ENCOUNTER — PATIENT OUTREACH (OUTPATIENT)
Dept: CARE COORDINATION | Facility: CLINIC | Age: 79
End: 2024-10-29
Payer: MEDICARE

## 2024-10-29 ASSESSMENT — ANXIETY QUESTIONNAIRES
8. IF YOU CHECKED OFF ANY PROBLEMS, HOW DIFFICULT HAVE THESE MADE IT FOR YOU TO DO YOUR WORK, TAKE CARE OF THINGS AT HOME, OR GET ALONG WITH OTHER PEOPLE?: VERY DIFFICULT
GAD7 TOTAL SCORE: 9
IF YOU CHECKED OFF ANY PROBLEMS ON THIS QUESTIONNAIRE, HOW DIFFICULT HAVE THESE PROBLEMS MADE IT FOR YOU TO DO YOUR WORK, TAKE CARE OF THINGS AT HOME, OR GET ALONG WITH OTHER PEOPLE: VERY DIFFICULT
7. FEELING AFRAID AS IF SOMETHING AWFUL MIGHT HAPPEN: SEVERAL DAYS
1. FEELING NERVOUS, ANXIOUS, OR ON EDGE: MORE THAN HALF THE DAYS
GAD7 TOTAL SCORE: 9
6. BECOMING EASILY ANNOYED OR IRRITABLE: SEVERAL DAYS
3. WORRYING TOO MUCH ABOUT DIFFERENT THINGS: MORE THAN HALF THE DAYS
GAD7 TOTAL SCORE: 9
5. BEING SO RESTLESS THAT IT IS HARD TO SIT STILL: SEVERAL DAYS
4. TROUBLE RELAXING: SEVERAL DAYS
7. FEELING AFRAID AS IF SOMETHING AWFUL MIGHT HAPPEN: SEVERAL DAYS
2. NOT BEING ABLE TO STOP OR CONTROL WORRYING: SEVERAL DAYS

## 2024-10-29 ASSESSMENT — PAIN SCALES - PAIN ENJOYMENT GENERAL ACTIVITY SCALE (PEG)
INTERFERED_GENERAL_ACTIVITY: 8
AVG_PAIN_PASTWEEK: 6
PEG_TOTALSCORE: 7.33
INTERFERED_ENJOYMENT_LIFE: 8
AVG_PAIN_PASTWEEK: 6
INTERFERED_GENERAL_ACTIVITY: 8
INTERFERED_ENJOYMENT_LIFE: 8
PEG_TOTALSCORE: 7.33

## 2024-10-29 NOTE — PROGRESS NOTES
Social Work Telephone Note  [facility]     Patient Name:  Harrison Thomas  /Age:  1945 (79 year old)    Referral Source: Dr Mar [facility]  Reason for Referral:  housing and resources     contacted Patient via telephone on 10/29/24. Sw had been consulted to connect with patient regarding resources and housing. Spoke with Harrison today briefly as in was driving to a doctors appointment and asked to talk at a different time. Writer called patient back and left a message providing him with writer contact information.  will await Patient's return phone call and will provide assistance at that time.          KINA Garza, [facility]    [facility]  [phone]  [email or web address]
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
## 2024-11-01 ENCOUNTER — OFFICE VISIT (OUTPATIENT)
Dept: PALLIATIVE MEDICINE | Facility: OTHER | Age: 79
End: 2024-11-01
Attending: INTERNAL MEDICINE
Payer: MEDICARE

## 2024-11-01 ENCOUNTER — TELEPHONE (OUTPATIENT)
Dept: PULMONOLOGY | Facility: CLINIC | Age: 79
End: 2024-11-01
Payer: MEDICARE

## 2024-11-01 VITALS — DIASTOLIC BLOOD PRESSURE: 66 MMHG | HEART RATE: 64 BPM | SYSTOLIC BLOOD PRESSURE: 122 MMHG | OXYGEN SATURATION: 96 %

## 2024-11-01 DIAGNOSIS — G89.29 CHRONIC BILATERAL LOW BACK PAIN WITHOUT SCIATICA: ICD-10-CM

## 2024-11-01 DIAGNOSIS — S32.000G LUMBAR COMPRESSION FRACTURE, WITH DELAYED HEALING, SUBSEQUENT ENCOUNTER: ICD-10-CM

## 2024-11-01 DIAGNOSIS — M54.50 CHRONIC BILATERAL LOW BACK PAIN WITHOUT SCIATICA: ICD-10-CM

## 2024-11-01 DIAGNOSIS — M54.16 LUMBAR RADICULOPATHY: Primary | ICD-10-CM

## 2024-11-01 DIAGNOSIS — G89.29 CHRONIC INTRACTABLE PAIN: ICD-10-CM

## 2024-11-01 DIAGNOSIS — M48.061 SPINAL STENOSIS OF LUMBAR REGION WITHOUT NEUROGENIC CLAUDICATION: ICD-10-CM

## 2024-11-01 PROCEDURE — G0463 HOSPITAL OUTPT CLINIC VISIT: HCPCS | Performed by: NURSE PRACTITIONER

## 2024-11-01 PROCEDURE — 99204 OFFICE O/P NEW MOD 45 MIN: CPT | Performed by: NURSE PRACTITIONER

## 2024-11-01 ASSESSMENT — PAIN SCALES - GENERAL: PAINLEVEL_OUTOF10: SEVERE PAIN (6)

## 2024-11-01 NOTE — PATIENT INSTRUCTIONS
Plan on initial consult on 10/31/2024:  A multimodal plan was developed today to treat your pain.  Multimodal analgesia is a strategy that reduces reliance on opioids through the use of non-opioid analgesics and therapies that have different mechanisms of action.      Diagnostics:   Reviewed lumbar MRI.    Medications:    The following OTC pain medications may be helpful, use as directed: Voltaren Gel 1%, CBD products, Arnica products, Capsaicin products, Australian Dream Cream, Epson It,  Lidocaine Patch, Solanpas, Biofreeze, Aspercream, Tiger Balm and Franklin Emu cream.  Apply heat or cold PRN.      Therapies:  He complete PT at [facility] in 2024.    Interventions:  R) L4-5 TF LESI with Dr[facility].      Follow up:     Return to clinic in 2-3 wks after injection.        PREETI Leo, RN, CNP, FNP  [facility]    Clinic Number:  [phone]  Call with any questions about your care and for scheduling assistance.   Calls are returned Monday through Friday between 8 AM and 4:30 PM. We usually get back to you within 2 business days depending on the issue/request.    If we are prescribing your medications:  For opioid medication refills, call the clinic or send a [employer] message 7 days in advance.  Please include:  Name of requested medication  Name of the pharmacy.  For non-opioid medications, call your pharmacy directly to request a refill. Please allow 3-4 days to be processed.   Per [place] State Law:  All controlled substance prescriptions must be filled within 30 days of being written.    For those controlled substances allowing refills, pickup must occur within 30 days of last fill.      We believe regular attendance is key to your success in our program!    Any time you are unable to keep your appointment we ask that you call us at least 24 hours in advance to cancel.This will allow  us to offer the appointment time to another patient.   Multiple missed appointments may lead to dismissal from the clinic.

## 2024-11-01 NOTE — TELEPHONE ENCOUNTER
Patient called in stating [facility] reached out to him to let him know they never received the albuterol prescription. Writer confirmed that we FAXed both Trelegy and Albuterol on 10/11. We will re FAX      Writer spoke with Zina Gagnon and requested her to reFAX albuterol prescription.

## 2024-11-04 ENCOUNTER — PATIENT OUTREACH (OUTPATIENT)
Dept: CARE COORDINATION | Facility: CLINIC | Age: 79
End: 2024-11-04
Payer: MEDICARE

## 2024-11-04 NOTE — PROGRESS NOTES
Clinic Care Coordination Contact  [facility]/Voicemail    Clinical Data: Care Coordinator Outreach    Outreach Documentation Number of Outreach Attempt   11/4/2024   3:59 PM 1       Left message on patient's voicemail with call back information and requested return call.      Plan:  Care Coordinator will try to reach patient again in 10 business days.    Clari Funez  [facility]  Clinic Care Coordinator  [facility]  [phone]  [email or web address]

## 2024-11-07 DIAGNOSIS — R53.81 PHYSICAL DECONDITIONING: ICD-10-CM

## 2024-11-07 DIAGNOSIS — S32.000G LUMBAR COMPRESSION FRACTURE, WITH DELAYED HEALING, SUBSEQUENT ENCOUNTER: ICD-10-CM

## 2024-11-07 DIAGNOSIS — C78.00 MALIGNANT NEOPLASM METASTATIC TO LUNG, UNSPECIFIED LATERALITY (H): Primary | ICD-10-CM

## 2024-11-07 DIAGNOSIS — S32.592G: ICD-10-CM

## 2024-11-07 RX ORDER — OXYCODONE HYDROCHLORIDE 5 MG/1
5 TABLET ORAL EVERY 6 HOURS PRN
Qty: 120 TABLET | Refills: 0 | Status: SHIPPED | OUTPATIENT
Start: 2024-11-07

## 2024-11-07 NOTE — TELEPHONE ENCOUNTER
"Pt is requesting Oxycodone refill. He also asked for a hospice referral. Reports he fell 2 days ago. Pt denies injury but states \"I can't do this anymore\".  Pt is requesting a call back with providers response. Please route message back to triage for follow up. Thank you    Routing refill request to provider for review/approval because:  Drug not on the G refill protocol       "

## 2024-11-08 ENCOUNTER — TELEPHONE (OUTPATIENT)
Dept: FAMILY MEDICINE | Facility: CLINIC | Age: 79
End: 2024-11-08
Payer: MEDICARE

## 2024-11-08 NOTE — TELEPHONE ENCOUNTER
Home care calling to state that the home care is discharging pt as he was admitted to hospice today.    Celina Tolbert RN

## 2024-11-18 ENCOUNTER — PATIENT OUTREACH (OUTPATIENT)
Dept: CARE COORDINATION | Facility: CLINIC | Age: 79
End: 2024-11-18
Payer: MEDICARE

## 2024-11-18 NOTE — PROGRESS NOTES
Clinic Care Coordination Contact  Care Coordination Clinician Chart Review    Situation: Patient chart reviewed by care coordinator.    Background: Pt enrolled in CCC.      Assessment: Upon chart review, patient is no longer eligible for CCC because  Patient enrolled into hospice.    Plan/Recommendations: Clinic Care Coordination Referral/order cancelled. RN/SW CC will perform no further monitoring/outreaches at this time and will remain available as needed. If new needs arise, a new Care Coordination Referral may be placed.    Clari Funez,  [facility]  Clinic Care Coordinator  [facility]  [phone]  [email or web address]

## 2024-11-23 DIAGNOSIS — J44.9 COPD, VERY SEVERE (H): ICD-10-CM

## 2024-11-25 RX ORDER — PREDNISONE 1 MG/1
TABLET ORAL
Qty: 60 TABLET | Refills: 3 | Status: SHIPPED | OUTPATIENT
Start: 2024-11-25

## 2024-11-27 ENCOUNTER — ANCILLARY PROCEDURE (OUTPATIENT)
Dept: BONE DENSITY | Facility: CLINIC | Age: 79
End: 2024-11-27
Attending: INTERNAL MEDICINE
Payer: MEDICARE

## 2024-11-27 ENCOUNTER — ANCILLARY PROCEDURE (OUTPATIENT)
Dept: CT IMAGING | Facility: CLINIC | Age: 79
End: 2024-11-27
Attending: PSYCHIATRY & NEUROLOGY
Payer: MEDICARE

## 2024-11-27 DIAGNOSIS — Z92.29 PERSONAL HISTORY OF OTHER DRUG THERAPY: ICD-10-CM

## 2024-11-27 DIAGNOSIS — Z79.52 LONG TERM (CURRENT) USE OF SYSTEMIC STEROIDS: ICD-10-CM

## 2024-11-27 DIAGNOSIS — Z79.83 LONG TERM (CURRENT) USE OF BISPHOSPHONATES: ICD-10-CM

## 2024-11-27 DIAGNOSIS — H53.2 DOUBLE VISION WITH BOTH EYES OPEN: ICD-10-CM

## 2024-11-27 PROCEDURE — 77080 DXA BONE DENSITY AXIAL: CPT | Performed by: RADIOLOGY

## 2024-11-27 PROCEDURE — 70480 CT ORBIT/EAR/FOSSA W/O DYE: CPT | Mod: GC | Performed by: RADIOLOGY

## 2024-11-27 NOTE — RESULT ENCOUNTER NOTE
The following letter pertains to your most recent diagnostic tests:      My review of your medical record suggest that you are taking my name is Dr. Brennan and I am covering for Dr. Dozier who is out of the office today.    The bone mineral density test demonstrates the presence of a compression fracture in the lumbar spine.  It also demonstrates low bone mineral density otherwise known as osteopenia.  My review of your medical records suggest that you are taking Prolia twice yearly to help with this problem and to prevent fractures.      Sincerely,    Dr. Brennan
PROVIDER:[TOKEN:[[id number]],FOLLOWUP:[1 week]]

## 2024-12-17 DIAGNOSIS — E78.5 HYPERLIPIDEMIA, UNSPECIFIED: ICD-10-CM

## 2024-12-17 RX ORDER — ROSUVASTATIN CALCIUM 10 MG/1
TABLET, COATED ORAL
Qty: 90 TABLET | Refills: 0 | Status: SHIPPED | OUTPATIENT
Start: 2024-12-17

## 2025-01-01 DIAGNOSIS — E89.0 POSTSURGICAL HYPOTHYROIDISM: Primary | ICD-10-CM

## 2025-01-04 ENCOUNTER — APPOINTMENT (OUTPATIENT)
Dept: CT IMAGING | Facility: CLINIC | Age: 80
End: 2025-01-04
Payer: MEDICARE

## 2025-01-04 ENCOUNTER — HOSPITAL ENCOUNTER (INPATIENT)
Facility: CLINIC | Age: 80
End: 2025-01-04
Attending: EMERGENCY MEDICINE | Admitting: HOSPITALIST
Payer: MEDICARE

## 2025-01-04 ENCOUNTER — APPOINTMENT (OUTPATIENT)
Dept: GENERAL RADIOLOGY | Facility: CLINIC | Age: 80
End: 2025-01-04
Attending: EMERGENCY MEDICINE
Payer: MEDICARE

## 2025-01-04 DIAGNOSIS — R53.1 GENERALIZED WEAKNESS: ICD-10-CM

## 2025-01-04 DIAGNOSIS — C79.9 METASTASIS FROM THYROID CANCER (H): ICD-10-CM

## 2025-01-04 DIAGNOSIS — R65.20 SEVERE SEPSIS (H): ICD-10-CM

## 2025-01-04 DIAGNOSIS — C73 METASTASIS FROM THYROID CANCER (H): ICD-10-CM

## 2025-01-04 DIAGNOSIS — J44.1 COPD EXACERBATION (H): ICD-10-CM

## 2025-01-04 DIAGNOSIS — K20.90 ESOPHAGITIS: ICD-10-CM

## 2025-01-04 DIAGNOSIS — J96.11 CHRONIC HYPOXEMIC RESPIRATORY FAILURE (H): ICD-10-CM

## 2025-01-04 DIAGNOSIS — J96.12 CHRONIC RESPIRATORY FAILURE WITH HYPERCAPNIA (H): ICD-10-CM

## 2025-01-04 DIAGNOSIS — M54.50 LOW BACK PAIN, UNSPECIFIED BACK PAIN LATERALITY, UNSPECIFIED CHRONICITY, UNSPECIFIED WHETHER SCIATICA PRESENT: ICD-10-CM

## 2025-01-04 DIAGNOSIS — R11.0 NAUSEA: ICD-10-CM

## 2025-01-04 DIAGNOSIS — F41.9 ANXIETY: ICD-10-CM

## 2025-01-04 DIAGNOSIS — A41.9 SEVERE SEPSIS (H): ICD-10-CM

## 2025-01-04 DIAGNOSIS — R19.7 DIARRHEA, UNSPECIFIED TYPE: ICD-10-CM

## 2025-01-04 DIAGNOSIS — G20.A1 PARKINSON'S DISEASE WITHOUT DYSKINESIA OR FLUCTUATING MANIFESTATIONS (H): ICD-10-CM

## 2025-01-04 DIAGNOSIS — I10 IDIOPATHIC HYPERTENSION: ICD-10-CM

## 2025-01-04 DIAGNOSIS — H40.003 GLAUCOMA SUSPECT, BILATERAL: Primary | ICD-10-CM

## 2025-01-04 DIAGNOSIS — Z99.81 DEPENDENCE ON SUPPLEMENTAL OXYGEN: ICD-10-CM

## 2025-01-04 DIAGNOSIS — K59.00 CONSTIPATION, UNSPECIFIED CONSTIPATION TYPE: ICD-10-CM

## 2025-01-04 DIAGNOSIS — J44.9 CHRONIC OBSTRUCTIVE PULMONARY DISEASE, UNSPECIFIED COPD TYPE (H): ICD-10-CM

## 2025-01-04 DIAGNOSIS — G47.00 INSOMNIA, UNSPECIFIED TYPE: ICD-10-CM

## 2025-01-04 DIAGNOSIS — J44.9 COPD, VERY SEVERE (H): ICD-10-CM

## 2025-01-04 LAB
ALBUMIN SERPL BCG-MCNC: 3.8 G/DL (ref 3.5–5.2)
ALBUMIN UR-MCNC: NEGATIVE MG/DL
ALP SERPL-CCNC: 89 U/L (ref 40–150)
ALT SERPL W P-5'-P-CCNC: 6 U/L (ref 0–70)
ANION GAP SERPL CALCULATED.3IONS-SCNC: 15 MMOL/L (ref 7–15)
APPEARANCE UR: CLEAR
AST SERPL W P-5'-P-CCNC: 18 U/L (ref 0–45)
ATRIAL RATE - MUSE: 92 BPM
BASE EXCESS BLDV CALC-SCNC: 3.6 MMOL/L (ref -3–3)
BASOPHILS # BLD AUTO: 0.1 10E3/UL (ref 0–0.2)
BASOPHILS NFR BLD AUTO: 1 %
BILIRUB SERPL-MCNC: 0.5 MG/DL
BILIRUB UR QL STRIP: NEGATIVE
BUN SERPL-MCNC: 26.7 MG/DL (ref 8–23)
CALCIUM SERPL-MCNC: 8.4 MG/DL (ref 8.8–10.4)
CHLORIDE SERPL-SCNC: 101 MMOL/L (ref 98–107)
COLOR UR AUTO: ABNORMAL
CREAT SERPL-MCNC: 0.87 MG/DL (ref 0.67–1.17)
DIASTOLIC BLOOD PRESSURE - MUSE: NORMAL MMHG
EGFRCR SERPLBLD CKD-EPI 2021: 89 ML/MIN/1.73M2
EOSINOPHIL # BLD AUTO: 0 10E3/UL (ref 0–0.7)
EOSINOPHIL NFR BLD AUTO: 0 %
ERYTHROCYTE [DISTWIDTH] IN BLOOD BY AUTOMATED COUNT: 13.7 % (ref 10–15)
FLUAV RNA SPEC QL NAA+PROBE: NEGATIVE
FLUBV RNA RESP QL NAA+PROBE: NEGATIVE
GLUCOSE SERPL-MCNC: 96 MG/DL (ref 70–99)
GLUCOSE UR STRIP-MCNC: NEGATIVE MG/DL
HCO3 BLDV-SCNC: 30 MMOL/L (ref 21–28)
HCO3 SERPL-SCNC: 26 MMOL/L (ref 22–29)
HCT VFR BLD AUTO: 47.9 % (ref 40–53)
HGB BLD-MCNC: 16.2 G/DL (ref 13.3–17.7)
HGB UR QL STRIP: NEGATIVE
HOLD SPECIMEN: NORMAL
HOLD SPECIMEN: NORMAL
IMM GRANULOCYTES # BLD: 0.5 10E3/UL
IMM GRANULOCYTES NFR BLD: 3 %
INTERPRETATION ECG - MUSE: NORMAL
KETONES UR STRIP-MCNC: NEGATIVE MG/DL
LACTATE SERPL-SCNC: 2.3 MMOL/L (ref 0.7–2)
LACTATE SERPL-SCNC: 2.9 MMOL/L (ref 0.7–2)
LACTATE SERPL-SCNC: 3.5 MMOL/L (ref 0.7–2)
LEUKOCYTE ESTERASE UR QL STRIP: NEGATIVE
LYMPHOCYTES # BLD AUTO: 0.5 10E3/UL (ref 0.8–5.3)
LYMPHOCYTES NFR BLD AUTO: 3 %
MCH RBC QN AUTO: 31.2 PG (ref 26.5–33)
MCHC RBC AUTO-ENTMCNC: 33.8 G/DL (ref 31.5–36.5)
MCV RBC AUTO: 92 FL (ref 78–100)
MONOCYTES # BLD AUTO: 1 10E3/UL (ref 0–1.3)
MONOCYTES NFR BLD AUTO: 7 %
MUCOUS THREADS #/AREA URNS LPF: PRESENT /LPF
NEUTROPHILS # BLD AUTO: 13.6 10E3/UL (ref 1.6–8.3)
NEUTROPHILS NFR BLD AUTO: 86 %
NITRATE UR QL: NEGATIVE
NRBC # BLD AUTO: 0 10E3/UL
NRBC BLD AUTO-RTO: 0 /100
O2/TOTAL GAS SETTING VFR VENT: 36 %
OXYHGB MFR BLDV: 70 % (ref 70–75)
P AXIS - MUSE: 27 DEGREES
PCO2 BLDV: 51 MM HG (ref 40–50)
PH BLDV: 7.38 [PH] (ref 7.32–7.43)
PH UR STRIP: 6.5 [PH] (ref 5–7)
PLATELET # BLD AUTO: 50 10E3/UL (ref 150–450)
PO2 BLDV: 39 MM HG (ref 25–47)
POTASSIUM SERPL-SCNC: 3.9 MMOL/L (ref 3.4–5.3)
PR INTERVAL - MUSE: 162 MS
PROCALCITONIN SERPL IA-MCNC: 0.06 NG/ML
PROT SERPL-MCNC: 6.1 G/DL (ref 6.4–8.3)
QRS DURATION - MUSE: 74 MS
QT - MUSE: 364 MS
QTC - MUSE: 450 MS
R AXIS - MUSE: 16 DEGREES
RBC # BLD AUTO: 5.2 10E6/UL (ref 4.4–5.9)
RBC URINE: 2 /HPF
RSV RNA SPEC NAA+PROBE: NEGATIVE
SAO2 % BLDV: 71.6 % (ref 70–75)
SARS-COV-2 RNA RESP QL NAA+PROBE: NEGATIVE
SODIUM SERPL-SCNC: 142 MMOL/L (ref 135–145)
SP GR UR STRIP: 1.01 (ref 1–1.03)
SYSTOLIC BLOOD PRESSURE - MUSE: NORMAL MMHG
T AXIS - MUSE: 48 DEGREES
TROPONIN T SERPL HS-MCNC: 41 NG/L
TROPONIN T SERPL HS-MCNC: 41 NG/L
UROBILINOGEN UR STRIP-MCNC: NORMAL MG/DL
VENTRICULAR RATE- MUSE: 92 BPM
WBC # BLD AUTO: 15.8 10E3/UL (ref 4–11)
WBC URINE: 0 /HPF

## 2025-01-04 PROCEDURE — 258N000003 HC RX IP 258 OP 636: Performed by: EMERGENCY MEDICINE

## 2025-01-04 PROCEDURE — 87641 MR-STAPH DNA AMP PROBE: CPT

## 2025-01-04 PROCEDURE — 120N000002 HC R&B MED SURG/OB UMMC

## 2025-01-04 PROCEDURE — 87640 STAPH A DNA AMP PROBE: CPT

## 2025-01-04 PROCEDURE — 83605 ASSAY OF LACTIC ACID: CPT | Performed by: EMERGENCY MEDICINE

## 2025-01-04 PROCEDURE — 94640 AIRWAY INHALATION TREATMENT: CPT

## 2025-01-04 PROCEDURE — 99291 CRITICAL CARE FIRST HOUR: CPT | Performed by: EMERGENCY MEDICINE

## 2025-01-04 PROCEDURE — 87086 URINE CULTURE/COLONY COUNT: CPT | Performed by: EMERGENCY MEDICINE

## 2025-01-04 PROCEDURE — 250N000012 HC RX MED GY IP 250 OP 636 PS 637

## 2025-01-04 PROCEDURE — 36415 COLL VENOUS BLD VENIPUNCTURE: CPT | Performed by: EMERGENCY MEDICINE

## 2025-01-04 PROCEDURE — 258N000003 HC RX IP 258 OP 636

## 2025-01-04 PROCEDURE — 96368 THER/DIAG CONCURRENT INF: CPT | Performed by: EMERGENCY MEDICINE

## 2025-01-04 PROCEDURE — 250N000009 HC RX 250

## 2025-01-04 PROCEDURE — 80053 COMPREHEN METABOLIC PANEL: CPT | Performed by: EMERGENCY MEDICINE

## 2025-01-04 PROCEDURE — 81001 URINALYSIS AUTO W/SCOPE: CPT | Performed by: EMERGENCY MEDICINE

## 2025-01-04 PROCEDURE — 999N000157 HC STATISTIC RCP TIME EA 10 MIN

## 2025-01-04 PROCEDURE — 85025 COMPLETE CBC W/AUTO DIFF WBC: CPT | Performed by: EMERGENCY MEDICINE

## 2025-01-04 PROCEDURE — 84145 PROCALCITONIN (PCT): CPT | Performed by: EMERGENCY MEDICINE

## 2025-01-04 PROCEDURE — 96365 THER/PROPH/DIAG IV INF INIT: CPT | Performed by: EMERGENCY MEDICINE

## 2025-01-04 PROCEDURE — 250N000011 HC RX IP 250 OP 636: Performed by: EMERGENCY MEDICINE

## 2025-01-04 PROCEDURE — 250N000013 HC RX MED GY IP 250 OP 250 PS 637

## 2025-01-04 PROCEDURE — 96366 THER/PROPH/DIAG IV INF ADDON: CPT | Performed by: EMERGENCY MEDICINE

## 2025-01-04 PROCEDURE — 99223 1ST HOSP IP/OBS HIGH 75: CPT | Mod: GC | Performed by: STUDENT IN AN ORGANIZED HEALTH CARE EDUCATION/TRAINING PROGRAM

## 2025-01-04 PROCEDURE — 250N000009 HC RX 250: Performed by: EMERGENCY MEDICINE

## 2025-01-04 PROCEDURE — 82805 BLOOD GASES W/O2 SATURATION: CPT | Performed by: EMERGENCY MEDICINE

## 2025-01-04 PROCEDURE — 84484 ASSAY OF TROPONIN QUANT: CPT | Performed by: EMERGENCY MEDICINE

## 2025-01-04 PROCEDURE — 99291 CRITICAL CARE FIRST HOUR: CPT | Mod: 25 | Performed by: EMERGENCY MEDICINE

## 2025-01-04 PROCEDURE — 71046 X-RAY EXAM CHEST 2 VIEWS: CPT | Mod: 26 | Performed by: RADIOLOGY

## 2025-01-04 PROCEDURE — 87040 BLOOD CULTURE FOR BACTERIA: CPT | Performed by: EMERGENCY MEDICINE

## 2025-01-04 PROCEDURE — 71275 CT ANGIOGRAPHY CHEST: CPT

## 2025-01-04 PROCEDURE — 87637 SARSCOV2&INF A&B&RSV AMP PRB: CPT | Performed by: EMERGENCY MEDICINE

## 2025-01-04 PROCEDURE — 85048 AUTOMATED LEUKOCYTE COUNT: CPT | Performed by: EMERGENCY MEDICINE

## 2025-01-04 PROCEDURE — 71046 X-RAY EXAM CHEST 2 VIEWS: CPT

## 2025-01-04 PROCEDURE — 250N000011 HC RX IP 250 OP 636

## 2025-01-04 PROCEDURE — 71275 CT ANGIOGRAPHY CHEST: CPT | Mod: 26 | Performed by: RADIOLOGY

## 2025-01-04 RX ORDER — IPRATROPIUM BROMIDE AND ALBUTEROL SULFATE 2.5; .5 MG/3ML; MG/3ML
3 SOLUTION RESPIRATORY (INHALATION)
Status: DISCONTINUED | OUTPATIENT
Start: 2025-01-04 | End: 2025-02-18 | Stop reason: HOSPADM

## 2025-01-04 RX ORDER — OXYCODONE HYDROCHLORIDE 10 MG/1
10 TABLET ORAL EVERY 4 HOURS PRN
Status: ON HOLD | COMMUNITY
End: 2025-02-17

## 2025-01-04 RX ORDER — FUROSEMIDE 20 MG/1
20-40 TABLET ORAL EVERY OTHER DAY
Status: DISCONTINUED | OUTPATIENT
Start: 2025-01-04 | End: 2025-01-08 | Stop reason: DRUGHIGH

## 2025-01-04 RX ORDER — ROSUVASTATIN CALCIUM 10 MG/1
10 TABLET, COATED ORAL DAILY
Status: DISCONTINUED | OUTPATIENT
Start: 2025-01-04 | End: 2025-02-18 | Stop reason: HOSPADM

## 2025-01-04 RX ORDER — AMLODIPINE BESYLATE 5 MG/1
5 TABLET ORAL AT BEDTIME
Status: DISCONTINUED | OUTPATIENT
Start: 2025-01-04 | End: 2025-02-18 | Stop reason: HOSPADM

## 2025-01-04 RX ORDER — TRAZODONE HYDROCHLORIDE 50 MG/1
50 TABLET ORAL AT BEDTIME
Status: DISCONTINUED | OUTPATIENT
Start: 2025-01-04 | End: 2025-01-04

## 2025-01-04 RX ORDER — LEVOTHYROXINE SODIUM 112 UG/1
112 TABLET ORAL DAILY
Status: DISCONTINUED | OUTPATIENT
Start: 2025-01-05 | End: 2025-02-18 | Stop reason: HOSPADM

## 2025-01-04 RX ORDER — LIDOCAINE 40 MG/G
CREAM TOPICAL
Status: DISCONTINUED | OUTPATIENT
Start: 2025-01-04 | End: 2025-02-18 | Stop reason: HOSPADM

## 2025-01-04 RX ORDER — NALOXONE HYDROCHLORIDE 0.4 MG/ML
0.2 INJECTION, SOLUTION INTRAMUSCULAR; INTRAVENOUS; SUBCUTANEOUS
Status: DISCONTINUED | OUTPATIENT
Start: 2025-01-04 | End: 2025-02-18 | Stop reason: HOSPADM

## 2025-01-04 RX ORDER — CARBIDOPA AND LEVODOPA 25; 100 MG/1; MG/1
1 TABLET ORAL 3 TIMES DAILY
Status: DISCONTINUED | OUTPATIENT
Start: 2025-01-04 | End: 2025-02-18 | Stop reason: HOSPADM

## 2025-01-04 RX ORDER — METHOCARBAMOL 500 MG/1
500 TABLET, FILM COATED ORAL 4 TIMES DAILY
Status: DISCONTINUED | OUTPATIENT
Start: 2025-01-04 | End: 2025-01-15

## 2025-01-04 RX ORDER — AMOXICILLIN 250 MG
2 CAPSULE ORAL 2 TIMES DAILY PRN
Status: DISCONTINUED | OUTPATIENT
Start: 2025-01-04 | End: 2025-02-18 | Stop reason: HOSPADM

## 2025-01-04 RX ORDER — METOPROLOL SUCCINATE 50 MG/1
50 TABLET, EXTENDED RELEASE ORAL DAILY
Status: DISCONTINUED | OUTPATIENT
Start: 2025-01-04 | End: 2025-02-18 | Stop reason: HOSPADM

## 2025-01-04 RX ORDER — NALOXONE HYDROCHLORIDE 0.4 MG/ML
0.4 INJECTION, SOLUTION INTRAMUSCULAR; INTRAVENOUS; SUBCUTANEOUS
Status: DISCONTINUED | OUTPATIENT
Start: 2025-01-04 | End: 2025-02-18 | Stop reason: HOSPADM

## 2025-01-04 RX ORDER — ACETAMINOPHEN 325 MG/1
650 TABLET ORAL EVERY 4 HOURS PRN
Status: DISCONTINUED | OUTPATIENT
Start: 2025-01-04 | End: 2025-02-18 | Stop reason: HOSPADM

## 2025-01-04 RX ORDER — GABAPENTIN 300 MG/1
600 CAPSULE ORAL 3 TIMES DAILY
Status: DISCONTINUED | OUTPATIENT
Start: 2025-01-04 | End: 2025-01-04

## 2025-01-04 RX ORDER — POLYETHYLENE GLYCOL 3350 17 G/17G
17 POWDER, FOR SOLUTION ORAL 2 TIMES DAILY PRN
Status: DISCONTINUED | OUTPATIENT
Start: 2025-01-04 | End: 2025-02-18 | Stop reason: HOSPADM

## 2025-01-04 RX ORDER — IPRATROPIUM BROMIDE AND ALBUTEROL SULFATE 2.5; .5 MG/3ML; MG/3ML
3 SOLUTION RESPIRATORY (INHALATION) ONCE
Status: COMPLETED | OUTPATIENT
Start: 2025-01-04 | End: 2025-01-04

## 2025-01-04 RX ORDER — CEFTRIAXONE 2 G/1
2 INJECTION, POWDER, FOR SOLUTION INTRAMUSCULAR; INTRAVENOUS ONCE
Status: COMPLETED | OUTPATIENT
Start: 2025-01-04 | End: 2025-01-04

## 2025-01-04 RX ORDER — AMOXICILLIN 250 MG
1 CAPSULE ORAL 2 TIMES DAILY PRN
Status: DISCONTINUED | OUTPATIENT
Start: 2025-01-04 | End: 2025-02-18 | Stop reason: HOSPADM

## 2025-01-04 RX ORDER — OXYCODONE HYDROCHLORIDE 5 MG/1
5 TABLET ORAL EVERY 6 HOURS PRN
Status: DISCONTINUED | OUTPATIENT
Start: 2025-01-04 | End: 2025-01-06

## 2025-01-04 RX ORDER — CEFTRIAXONE 2 G/1
2 INJECTION, POWDER, FOR SOLUTION INTRAMUSCULAR; INTRAVENOUS EVERY 24 HOURS
Status: DISCONTINUED | OUTPATIENT
Start: 2025-01-05 | End: 2025-01-09

## 2025-01-04 RX ORDER — CARBOXYMETHYLCELLULOSE SODIUM 5 MG/ML
1 SOLUTION/ DROPS OPHTHALMIC
Status: DISCONTINUED | OUTPATIENT
Start: 2025-01-04 | End: 2025-02-18 | Stop reason: HOSPADM

## 2025-01-04 RX ORDER — PREDNISONE 20 MG/1
40 TABLET ORAL DAILY
Status: COMPLETED | OUTPATIENT
Start: 2025-01-04 | End: 2025-01-08

## 2025-01-04 RX ORDER — IOPAMIDOL 755 MG/ML
57 INJECTION, SOLUTION INTRAVASCULAR ONCE
Status: COMPLETED | OUTPATIENT
Start: 2025-01-04 | End: 2025-01-04

## 2025-01-04 RX ORDER — ASPIRIN 81 MG/1
81 TABLET ORAL 2 TIMES DAILY
Status: DISCONTINUED | OUTPATIENT
Start: 2025-01-04 | End: 2025-02-18 | Stop reason: HOSPADM

## 2025-01-04 RX ORDER — ONDANSETRON 4 MG/1
4 TABLET, ORALLY DISINTEGRATING ORAL EVERY 6 HOURS PRN
Status: DISCONTINUED | OUTPATIENT
Start: 2025-01-04 | End: 2025-02-18 | Stop reason: HOSPADM

## 2025-01-04 RX ADMIN — IPRATROPIUM BROMIDE AND ALBUTEROL SULFATE 3 ML: .5; 3 SOLUTION RESPIRATORY (INHALATION) at 20:15

## 2025-01-04 RX ADMIN — SODIUM CHLORIDE, POTASSIUM CHLORIDE, SODIUM LACTATE AND CALCIUM CHLORIDE 500 ML: 600; 310; 30; 20 INJECTION, SOLUTION INTRAVENOUS at 18:35

## 2025-01-04 RX ADMIN — IPRATROPIUM BROMIDE AND ALBUTEROL SULFATE 3 ML: .5; 3 SOLUTION RESPIRATORY (INHALATION) at 14:10

## 2025-01-04 RX ADMIN — IOPAMIDOL 57 ML: 755 INJECTION, SOLUTION INTRAVENOUS at 19:35

## 2025-01-04 RX ADMIN — CARBIDOPA AND LEVODOPA 1 TABLET: 25; 100 TABLET ORAL at 20:55

## 2025-01-04 RX ADMIN — PREDNISONE 40 MG: 20 TABLET ORAL at 18:35

## 2025-01-04 RX ADMIN — CEFTRIAXONE SODIUM 2 G: 2 INJECTION, POWDER, FOR SOLUTION INTRAMUSCULAR; INTRAVENOUS at 14:03

## 2025-01-04 RX ADMIN — AZITHROMYCIN MONOHYDRATE 500 MG: 500 INJECTION, POWDER, LYOPHILIZED, FOR SOLUTION INTRAVENOUS at 15:44

## 2025-01-04 RX ADMIN — Medication 1 HALF-TAB: at 20:55

## 2025-01-04 RX ADMIN — SODIUM CHLORIDE 2000 ML: 9 INJECTION, SOLUTION INTRAVENOUS at 14:02

## 2025-01-04 ASSESSMENT — ACTIVITIES OF DAILY LIVING (ADL)
ADLS_ACUITY_SCORE: 59
ADLS_ACUITY_SCORE: 62
ADLS_ACUITY_SCORE: 59

## 2025-01-04 NOTE — ED PROVIDER NOTES
"  History     Chief Complaint   Patient presents with    Shortness of Breath     HPI  Harrison Thomas is a 79 year old male with PMH notable for CAD s/p PCI to circumflex, severe COPD on chronic O2 by NC, probable lung malignancy, TIA  who presents to the ED with generalized weakness.  EMS reports that patient required a 1 person assist to get up which is reportedly unusual for him.  An albuterol neb was started by fire first responders.  Patient was noted to have coarse breath sounds and mild tachypnea en route.  Patient and daughter requested transport despite his hospice status.    Patient reports that he has felt more weak over the past several days, especially today.  He denies the weakness being in any 1 part of the body more than others, does report having chronic \"for years\" of the left leg being slightly weaker than the right which is unchanged.  He states that his home health nurse was worried he might have pneumonia.  He endorses acute on chronic cough and shortness of breath, feels somewhat improved since a nebulizer with EMS.  He denies chest pain/tightness, denies headache, denies fevers, denies vomiting, denies diarrhea, denies abdominal pain.  Patient states that he would want treatment for pneumonia or other reversible conditions, does not desire intubation or resuscitation in the case of cardiac arrest.        Physical Exam   BP: 99/78  Pulse: 108  Temp: 98.5  F (36.9  C)  Resp: 18  SpO2: 96 %    Physical Exam  General: Appears somewhat dyspneic, secretions present. Appears stated age.   HENT: MMM, no oropharyngeal lesions  Eyes: PERRL, normal sclerae   Cardio: Mildly tachycardic rate. Regular rhythm. Extremities well perfused  Resp: Mildly increased work of breathing, mildly elevated respiratory rate.  Coarse breath sounds with expiratory wheezes are present diffusely  Abdomen: no tenderness, non-distended, no rebound, no guarding  Neuro: alert and fully oriented. Answers questions " approrpiately. CN II-XII intact to testing. Strength left: 5/5 , 5/5 elbow flexion, 5/5 elbow extension, 5/5 shoulder abduction, 5/5 hip flexion, 5/5 knee flexion, 5/5 knee extension. Strength right: 5/5 , 5/5 elbow flexion, 5/5 elbow extension, 5/5 shoulder abduction, 5/5 hip flexion, 5/5 knee flexion, 5/5 knee extension. Sensation intact to soft touch in all extremities. No pronator drift. Normal tone, no clonus.   Psych: normal affect, normal behavior      ED Course      Procedures       Critical Care Addendum  My initial assessment, based on my review of prehospital provider report, focused history, physical exam, and interpretation of laboratory studies , established a high suspicion that Harrison Thomas has sepsis with indication for early goal-directed therapy, which requires immediate intervention, and therefore he is critically ill.     After the initial assessment, the care team initiated multiple lab tests, initiated IV fluid administration, and initiated medication therapy with broad spectrum antibiotic  to provide stabilization care. Due to the critical nature of this patient, I reassessed nursing observations, vital signs, physical exam, interpretation of imaging and laboratory studies, and respiratory status multiple times prior to his disposition.     Time also spent performing documentation, discussion with family to obtain medical information for decision making, reviewing test results, and coordination of care.     Critical care time (excluding teaching time and procedures): 40 minutes.            EKG Interpretation:      Interpreted by Atif Lambert MD  Time reviewed:1231   Symptoms at time of EKG: dyspnea   Rhythm: Normal sinus   Rate: Normal  Axis: left axis deviation  Ectopy: None  Conduction: Normal  ST Segments/ T Waves: No ST-T wave changes and No acute ischemic changes  Q Waves: None  Comparison to prior: Unchanged from 12/6/2023    Clinical Impression: normal EKG'      The  patient has signs of sepsis   Sepsis ED evaluation   The patient has signs of sepsis as evidenced by:  1. Presence of 2 SIRS criteria, suspected infection, AND  2. Organ dysfunction: Lactic Acidosis with value >2.0 due to sepsis    Time zero:  1411  on 01/04/25 as this was the time when  negative COVID/influenza/RSV resulted, raising suspicion for bacterial infection.    Lactic Acid Results:  Recent Labs   Lab Test 01/04/25  1340 01/04/25  1227 12/07/23  0734   LACT 2.3* 2.9* 1.8       3 Hour Bundle 6 Hour Bundle (Reassessment)   Blood Cultures before IV Antibiotics: Yes  Antibiotics given: see below  Prehospital fluid volume (mL):                     Total fluids given (ED +Pre-hospital):  Full 30 mL/kg bolus given based on ideal body weight: 2,150 mL   Repeat Lactic Acid Level: Ordered by reflex for 2 hours after initial lactic acid collection.  Vasopressors: MAP>65 after initial IVF bolus, will continue to monitor fluid status and vital signs.  Repeat perfusion exam: I attest to having performed a repeat sepsis exam and assessment of perfusion at  1600 . Improved perfusion.    BMI Readings from Last 1 Encounters:   10/24/24 23.61 kg/m        Anti-infectives (From admission through now)      Start     Dose/Rate Route Frequency Ordered Stop    01/04/25 1400  azithromycin (ZITHROMAX) 500 mg in sodium chloride 0.9 % 250 mL intermittent infusion         500 mg  over 1 Hours Intravenous ONCE 01/04/25 1316      01/04/25 1320  cefTRIAXone (ROCEPHIN) 2 g vial to attach to  ml bag for ADULTS or NS 50 ml bag for PEDS         2 g  over 30 Minutes Intravenous ONCE 01/04/25 1316 01/04/25 1433                   Labs Ordered and Resulted from Time of ED Arrival to Time of ED Departure   COMPREHENSIVE METABOLIC PANEL - Abnormal       Result Value    Sodium 142      Potassium 3.9      Carbon Dioxide (CO2) 26      Anion Gap 15      Urea Nitrogen 26.7 (*)     Creatinine 0.87      GFR Estimate 89      Calcium 8.4 (*)      Chloride 101      Glucose 96      Alkaline Phosphatase 89      AST 18      ALT 6      Protein Total 6.1 (*)     Albumin 3.8      Bilirubin Total 0.5     BLOOD GAS VENOUS - Abnormal    pH Venous 7.38      pCO2 Venous 51 (*)     pO2 Venous 39      Bicarbonate Venous 30 (*)     Base Excess/Deficit Venous 3.6 (*)     FIO2 36      Oxyhemoglobin Venous 70      O2 Sat, Venous 71.6     LACTIC ACID WHOLE BLOOD - Abnormal    Lactic Acid 2.9 (*)    TROPONIN T, HIGH SENSITIVITY - Abnormal    Troponin T, High Sensitivity 41 (*)    CBC WITH PLATELETS AND DIFFERENTIAL - Abnormal    WBC Count 15.8 (*)     RBC Count 5.20      Hemoglobin 16.2      Hematocrit 47.9      MCV 92      MCH 31.2      MCHC 33.8      RDW 13.7      Platelet Count 50 (*)     % Neutrophils 86      % Lymphocytes 3      % Monocytes 7      % Eosinophils 0      % Basophils 1      % Immature Granulocytes 3      NRBCs per 100 WBC 0      Absolute Neutrophils 13.6 (*)     Absolute Lymphocytes 0.5 (*)     Absolute Monocytes 1.0      Absolute Eosinophils 0.0      Absolute Basophils 0.1      Absolute Immature Granulocytes 0.5 (*)     Absolute NRBCs 0.0     LACTIC ACID WHOLE BLOOD WITH 1X REPEAT IN 2 HR WHEN >2 - Abnormal    Lactic Acid, Initial 2.3 (*)    ROUTINE UA WITH MICROSCOPIC - Abnormal    Color Urine Light Yellow      Appearance Urine Clear      Glucose Urine Negative      Bilirubin Urine Negative      Ketones Urine Negative      Specific Gravity Urine 1.012      Blood Urine Negative      pH Urine 6.5      Protein Albumin Urine Negative      Urobilinogen Urine Normal      Nitrite Urine Negative      Leukocyte Esterase Urine Negative      Mucus Urine Present (*)     RBC Urine 2      WBC Urine 0     TROPONIN T, HIGH SENSITIVITY - Abnormal    Troponin T, High Sensitivity 41 (*)    PROCALCITONIN - Normal    Procalcitonin 0.06     INFLUENZA A/B, RSV AND SARS-COV2 PCR - Normal    Influenza A PCR Negative      Influenza B PCR Negative      RSV PCR Negative      SARS CoV2  PCR Negative     LACTIC ACID WHOLE BLOOD   BLOOD CULTURE   BLOOD CULTURE   URINE CULTURE     Chest XR,  PA & LAT   Final Result   IMPRESSION:   1. Unchanged right upper lobe lung fibrosis with resultant volume loss   and hemidiaphragm elevation.   2. No focal airspace opacities.      I have personally reviewed the examination and initial interpretation   and I agree with the findings.      JUAN RUELAS MD            SYSTEM ID:  [id number]             Assessments & Plan   Patient presenting with generalized weakness, tachypnea, cough. Vitals in the ED unremarkable. Nursing notes reviewed. Initial differential diagnosis includes but not limited to sepsis, pneumonia, viral URI, urinary tract infection.     No focal deficit to suggest CVA.  Broad workup undertaken.    Labs notable for leukocytosis with WBC of 15.8 and lactate elevation to 2.9, raising suspicion of infection.  Procalcitonin was in the low risk range at 0.06.  Influenza, COVID, RSV negative.  Chest x-ray showed some chronic fibrotic changes, no clear pneumonia.  EKG showed normal sinus rhythm without evidence of acute ischemia.  High-sensitivity troponin mildly elevated at 41 but stable on recheck, ACS very unlikely.  UA without evidence of UTI.    Discussed goals of care with the patient and daughter, noted that hospice generally has only comfort directed therapies rather than life-prolonging therapies.  Patient and daughter were in favor of treatment and admission, have contacted their hospice company.    Early goal-directed treatment undertaken for likely sepsis with suspected respiratory but overall unclear source.  Ceftriaxone and azithromycin given after cultures.  DuoNeb given with improvement in respiratory status on reassessment.    After counseling on the diagnosis, work-up, and treatment plan, the patient was admitted to medicine.     Final diagnoses:   Severe sepsis (H)   Generalized weakness   Chronic respiratory failure with hypercapnia  (H)     New Prescriptions    No medications on file     --  Atif Lambert MD   Emergency Medicine   [facility] EMERGENCY DEPARTMENT  1/4/2025       Atif Lambert MD  01/04/25 7313
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
## 2025-01-04 NOTE — ED TRIAGE NOTES
Pt biba from home, pt has been having increased weakness over last couple days, unable to stand today, pt neg for stroke per EMS. BL rales in lungs. Cough noted. On Hospice. BL 2L. Albuterol neb given. 95% on 4L. RR 20 . .

## 2025-01-05 LAB
ANION GAP SERPL CALCULATED.3IONS-SCNC: 11 MMOL/L (ref 7–15)
BACTERIA UR CULT: NORMAL
BASOPHILS # BLD MANUAL: 0 10E3/UL (ref 0–0.2)
BASOPHILS NFR BLD MANUAL: 0 %
BUN SERPL-MCNC: 16.6 MG/DL (ref 8–23)
CALCIUM SERPL-MCNC: 8.1 MG/DL (ref 8.8–10.4)
CHLORIDE SERPL-SCNC: 106 MMOL/L (ref 98–107)
CREAT SERPL-MCNC: 0.72 MG/DL (ref 0.67–1.17)
EGFRCR SERPLBLD CKD-EPI 2021: >90 ML/MIN/1.73M2
EOSINOPHIL # BLD MANUAL: 0 10E3/UL (ref 0–0.7)
EOSINOPHIL NFR BLD MANUAL: 0 %
ERYTHROCYTE [DISTWIDTH] IN BLOOD BY AUTOMATED COUNT: 13.4 % (ref 10–15)
GLUCOSE BLDC GLUCOMTR-MCNC: 145 MG/DL (ref 70–99)
GLUCOSE SERPL-MCNC: 103 MG/DL (ref 70–99)
HCO3 SERPL-SCNC: 28 MMOL/L (ref 22–29)
HCT VFR BLD AUTO: 45.5 % (ref 40–53)
HGB BLD-MCNC: 15 G/DL (ref 13.3–17.7)
LACTATE SERPL-SCNC: 3.2 MMOL/L (ref 0.7–2)
LYMPHOCYTES # BLD MANUAL: 0.7 10E3/UL (ref 0.8–5.3)
LYMPHOCYTES NFR BLD MANUAL: 6 %
MCH RBC QN AUTO: 30.5 PG (ref 26.5–33)
MCHC RBC AUTO-ENTMCNC: 33 G/DL (ref 31.5–36.5)
MCV RBC AUTO: 93 FL (ref 78–100)
METAMYELOCYTES # BLD MANUAL: 0.2 10E3/UL
METAMYELOCYTES NFR BLD MANUAL: 2 %
MONOCYTES # BLD MANUAL: 0.5 10E3/UL (ref 0–1.3)
MONOCYTES NFR BLD MANUAL: 4 %
MRSA DNA SPEC QL NAA+PROBE: NEGATIVE
NEUTROPHILS # BLD MANUAL: 9.9 10E3/UL (ref 1.6–8.3)
NEUTROPHILS NFR BLD MANUAL: 88 %
PLAT MORPH BLD: ABNORMAL
PLATELET # BLD AUTO: 60 10E3/UL (ref 150–450)
POTASSIUM SERPL-SCNC: 4.1 MMOL/L (ref 3.4–5.3)
RBC # BLD AUTO: 4.91 10E6/UL (ref 4.4–5.9)
RBC MORPH BLD: ABNORMAL
SA TARGET DNA: NEGATIVE
SODIUM SERPL-SCNC: 145 MMOL/L (ref 135–145)
WBC # BLD AUTO: 11.3 10E3/UL (ref 4–11)

## 2025-01-05 PROCEDURE — 250N000012 HC RX MED GY IP 250 OP 636 PS 637

## 2025-01-05 PROCEDURE — 99232 SBSQ HOSP IP/OBS MODERATE 35: CPT | Performed by: INTERNAL MEDICINE

## 2025-01-05 PROCEDURE — 80048 BASIC METABOLIC PNL TOTAL CA: CPT

## 2025-01-05 PROCEDURE — 250N000013 HC RX MED GY IP 250 OP 250 PS 637

## 2025-01-05 PROCEDURE — 85014 HEMATOCRIT: CPT

## 2025-01-05 PROCEDURE — 85007 BL SMEAR W/DIFF WBC COUNT: CPT

## 2025-01-05 PROCEDURE — 36415 COLL VENOUS BLD VENIPUNCTURE: CPT

## 2025-01-05 PROCEDURE — 94640 AIRWAY INHALATION TREATMENT: CPT

## 2025-01-05 PROCEDURE — 250N000009 HC RX 250

## 2025-01-05 PROCEDURE — 250N000011 HC RX IP 250 OP 636

## 2025-01-05 PROCEDURE — 250N000013 HC RX MED GY IP 250 OP 250 PS 637: Performed by: STUDENT IN AN ORGANIZED HEALTH CARE EDUCATION/TRAINING PROGRAM

## 2025-01-05 PROCEDURE — 258N000003 HC RX IP 258 OP 636: Performed by: STUDENT IN AN ORGANIZED HEALTH CARE EDUCATION/TRAINING PROGRAM

## 2025-01-05 PROCEDURE — 94640 AIRWAY INHALATION TREATMENT: CPT | Mod: 76

## 2025-01-05 PROCEDURE — 120N000002 HC R&B MED SURG/OB UMMC

## 2025-01-05 PROCEDURE — 999N000157 HC STATISTIC RCP TIME EA 10 MIN

## 2025-01-05 PROCEDURE — 250N000011 HC RX IP 250 OP 636: Performed by: STUDENT IN AN ORGANIZED HEALTH CARE EDUCATION/TRAINING PROGRAM

## 2025-01-05 PROCEDURE — 83605 ASSAY OF LACTIC ACID: CPT | Performed by: STUDENT IN AN ORGANIZED HEALTH CARE EDUCATION/TRAINING PROGRAM

## 2025-01-05 PROCEDURE — 85048 AUTOMATED LEUKOCYTE COUNT: CPT

## 2025-01-05 RX ORDER — FLUTICASONE FUROATE AND VILANTEROL 200; 25 UG/1; UG/1
1 POWDER RESPIRATORY (INHALATION) DAILY
Status: DISCONTINUED | OUTPATIENT
Start: 2025-01-05 | End: 2025-01-05

## 2025-01-05 RX ORDER — HYDRALAZINE HYDROCHLORIDE 20 MG/ML
10 INJECTION INTRAMUSCULAR; INTRAVENOUS EVERY 6 HOURS PRN
Status: DISCONTINUED | OUTPATIENT
Start: 2025-01-05 | End: 2025-01-07

## 2025-01-05 RX ORDER — ALBUTEROL SULFATE 90 UG/1
2 INHALANT RESPIRATORY (INHALATION)
Status: DISCONTINUED | OUTPATIENT
Start: 2025-01-05 | End: 2025-01-08

## 2025-01-05 RX ADMIN — GABAPENTIN 400 MG: 300 CAPSULE ORAL at 14:02

## 2025-01-05 RX ADMIN — PREDNISONE 40 MG: 20 TABLET ORAL at 08:00

## 2025-01-05 RX ADMIN — CEFTRIAXONE SODIUM 2 G: 2 INJECTION, POWDER, FOR SOLUTION INTRAMUSCULAR; INTRAVENOUS at 14:02

## 2025-01-05 RX ADMIN — IPRATROPIUM BROMIDE AND ALBUTEROL SULFATE 3 ML: .5; 3 SOLUTION RESPIRATORY (INHALATION) at 10:21

## 2025-01-05 RX ADMIN — METOPROLOL SUCCINATE 50 MG: 50 TABLET, EXTENDED RELEASE ORAL at 08:00

## 2025-01-05 RX ADMIN — IPRATROPIUM BROMIDE AND ALBUTEROL SULFATE 3 ML: .5; 3 SOLUTION RESPIRATORY (INHALATION) at 20:49

## 2025-01-05 RX ADMIN — LEVOTHYROXINE SODIUM 112 MCG: 112 TABLET ORAL at 08:00

## 2025-01-05 RX ADMIN — Medication 1 HALF-TAB: at 14:02

## 2025-01-05 RX ADMIN — CARBIDOPA AND LEVODOPA 1 TABLET: 25; 100 TABLET ORAL at 08:00

## 2025-01-05 RX ADMIN — GABAPENTIN 400 MG: 300 CAPSULE ORAL at 19:36

## 2025-01-05 RX ADMIN — Medication 1 HALF-TAB: at 19:36

## 2025-01-05 RX ADMIN — CARBIDOPA AND LEVODOPA 1 TABLET: 25; 100 TABLET ORAL at 14:02

## 2025-01-05 RX ADMIN — IPRATROPIUM BROMIDE AND ALBUTEROL SULFATE 3 ML: .5; 3 SOLUTION RESPIRATORY (INHALATION) at 16:30

## 2025-01-05 RX ADMIN — Medication 1 HALF-TAB: at 08:03

## 2025-01-05 RX ADMIN — IPRATROPIUM BROMIDE AND ALBUTEROL SULFATE 3 ML: .5; 3 SOLUTION RESPIRATORY (INHALATION) at 12:00

## 2025-01-05 RX ADMIN — CARBIDOPA AND LEVODOPA 1 TABLET: 25; 100 TABLET ORAL at 19:36

## 2025-01-05 RX ADMIN — AZITHROMYCIN MONOHYDRATE 500 MG: 500 INJECTION, POWDER, LYOPHILIZED, FOR SOLUTION INTRAVENOUS at 15:44

## 2025-01-05 RX ADMIN — GABAPENTIN 400 MG: 300 CAPSULE ORAL at 08:00

## 2025-01-05 ASSESSMENT — ACTIVITIES OF DAILY LIVING (ADL)
ADLS_ACUITY_SCORE: 62
TOILETING_ISSUES: NO
ADLS_ACUITY_SCORE: 62
ADLS_ACUITY_SCORE: 62
EQUIPMENT_CURRENTLY_USED_AT_HOME: WALKER, ROLLING
ADLS_ACUITY_SCORE: 62
DIFFICULTY_EATING/SWALLOWING: NO
DOING_ERRANDS_INDEPENDENTLY_DIFFICULTY: NO
ADLS_ACUITY_SCORE: 62
ADLS_ACUITY_SCORE: 39
ADLS_ACUITY_SCORE: 62
ADLS_ACUITY_SCORE: 62
WALKING_OR_CLIMBING_STAIRS_DIFFICULTY: NO
ADLS_ACUITY_SCORE: 62
CHANGE_IN_FUNCTIONAL_STATUS_SINCE_ONSET_OF_CURRENT_ILLNESS/INJURY: NO
CONCENTRATING,_REMEMBERING_OR_MAKING_DECISIONS_DIFFICULTY: NO
ADLS_ACUITY_SCORE: 62
WEAR_GLASSES_OR_BLIND: YES
DRESSING/BATHING_DIFFICULTY: NO
DIFFICULTY_COMMUNICATING: NO
ADLS_ACUITY_SCORE: 62
ADLS_ACUITY_SCORE: 62
VISION_MANAGEMENT: GLASSES
ADLS_ACUITY_SCORE: 62
NUMBER_OF_TIMES_PATIENT_HAS_FALLEN_WITHIN_LAST_SIX_MONTHS: 2
ADLS_ACUITY_SCORE: 62
DEPENDENT_IADLS:: CLEANING;COOKING;LAUNDRY;MEAL PREPARATION
FALL_HISTORY_WITHIN_LAST_SIX_MONTHS: YES
HEARING_DIFFICULTY_OR_DEAF: NO
ADLS_ACUITY_SCORE: 62
ADLS_ACUITY_SCORE: 62

## 2025-01-05 NOTE — PROGRESS NOTES
"Brief Care Management Note:    VM received on unit SW phone from [facility]/ [facility].  She was providing notification that pt is open to their services and requested general updates.  She provided the following call back number: [phone].    See note from Jerry RNCC for care mgmt initial assessment.  In his note, he also wrote \"Referral completed to [facility] hospice to update on patient status.\"        Jose Luis Tracy RNCC    Social Work and Care Management Department     SEARCHABLE in [facility] - search CARE COORDINATOR     [place] ([phone]) Saturday & Sunday; ([phone]) [facility] Recognized Holidays     Units: 5A Onc 5201 - [phone] RNCC,  5A Onc 5220 thru 5240 RNCC, 5C OFFSERVICE [phone] RNCC & 5C OFF SERVICE [id number] RNCC     Units: 6B Vocera, 6C Card 6401 thru 6420 RNCC, 6C Card 6502 thru 6514 RNCC & 6C Card 6515 thru 6519 RNCC      Units: 7A SOT RNCC Vocera, 7B Med Surg Vocera, 7C Med Surg 7401 thru 7418 RNCC & 7C Med Surg 7502 thru 7521 RNCC     Units: 6A Vocera & 4A CVICU Vocera, 4C MICU Vocera, and 4E SICU Vocera       Units: 5 Ortho Vocera & 5 Med Surg Vocera      Units: 6 Med Surg Vocera & 8 Med Surg Vocera    "

## 2025-01-05 NOTE — PLAN OF CARE
VS: VSS   O2: O2 WDL on 1L NC.   Per report from EB nurse and EMS, patient is usually on 2L at baseline    Output: Continent of B/B. Bedside urinal in use   Last BM: 1/3   Activity: AO1 with GB/walker   Skin: Scattered scabbing/bruises over entire body, scab to 2nd L toe, bruise to center of chest   Pain: Chronic back pain, denied wanting pain meds    CMS: Intact, AOx4   Dressing: none   Diet: regular   LDA: L PIV SL   Equipment: Personal belongings   Plan: Continue POC       Plan of Care Reviewed With: patient    Overall Patient Progress: no changeOverall Patient Progress: no change    Outcome Evaluation: Patient arrived to unit accompanied by EMS around 2100. On continuous pulse ox & tele

## 2025-01-05 NOTE — PROGRESS NOTES
Pt transported to [facility] [place] [place] via EMS. Pt sent with all belongings.

## 2025-01-05 NOTE — PHARMACY-ADMISSION MEDICATION HISTORY
Pharmacist Admission Medication History    Admission medication history is complete. The information provided in this note is only as accurate as the sources available at the time of the update.    Information Source(s): Patient, CareEverywhere/SureScripts, and PDMP  via in-person    Pertinent Information:   Patient was a good historian of their medications.  Patient reported his levothyroxine is currently 112 mcg although most recent fill history was for 125 mcg tablet.   Patient's prescription instructions for furosemide state 20-40 mg daily depending on if having COPD symptoms or HTN. Patient reports he typically takes just 20 mg daily.   Patient had Mucomyst nebulized and an albuterol inhaler on medication list - patient reported he may have used these in the past and is not sure if he still has them, does not look like there is recent fill history for each so not marked as taking but left on list.   Patient reported use of morphine sulphate concentrated solution. Per PDMP search, last filled 12/16/24 a 5 day supply (60 mL) of morphine sulfate 100mg/5mL concentration - exact instructions unclear.     Changes made to PTA medication list:  Added: None  Deleted:   Trazodone 50 mg tablet - take 50 mg by mouth at bedtime (per patient, no longer taking)  Changed:   Oxycodone 5 mg tablet --> Oxycodone 10 mg tablet - take 10 mg by mouth every 4 hours as needed (per patient and PDMP)  Gabapentin 400 mg capsule - take 600 mg by mouth three times daily --> take 400 mg by mouth three times daily (patient confirmed only taking 400 mg capsules, sometimes only two times daily. Last fill seen in sure scripts was 7/2024 for 90 day supply)    Allergies reviewed with patient and updates made in EHR: yes    Medication History Completed By: Lokesh Couch RPH 1/4/2025 9:07 PM    Current Facility-Administered Medications for the 1/4/25 encounter (Hospital Encounter)   Medication    denosumab (PROLIA) injection 60 mg     PTA Med List    Medication Sig Last Dose/Taking    acetaminophen (TYLENOL) 500 MG tablet Take 1,000 mg by mouth 3 times daily Past Week    albuterol (ACCUNEB) 1.25 MG/3ML neb solution Take 1 vial (1.25 mg) by nebulization every 4 hours as needed for shortness of breath or wheezing. Past Month    amLODIPine (NORVASC) 5 MG tablet Take 1 tablet (5 mg) by mouth at bedtime. 1/3/2025    ascorbic acid 1000 MG TABS tablet Take 2,000 mg by mouth daily 1/3/2025    aspirin 81 MG EC tablet Take 81 mg by mouth 2 times daily 1/4/2025 Morning    azithromycin (ZITHROMAX) 500 MG tablet Take 1 tablet (500 mg) by mouth every other day 1/2/2025    calcium carbonate (OS-KARLIE) 1500 (600 Ca) MG tablet Take 600 mg by mouth daily 1/3/2025    carbidopa-levodopa (SINEMET)  MG tablet Take 1/2 tablet three times a day for one week, then increase to 1 tablet three times a day for one week, then increase to 1.5 tablets three times a day and stay at that dose. 1/4/2025 Morning    carboxymethylcellulose PF (REFRESH PLUS) 0.5 % ophthalmic solution Place 1 drop into both eyes every hour as needed for dry eyes Past Week    Fluticasone-Umeclidin-Vilanterol (TRELEGY ELLIPTA) 200-62.5-25 MCG/ACT oral inhaler Inhale 1 puff into the lungs daily In AM. Rinse mouth after use. 1/4/2025 Morning    furosemide (LASIX) 20 MG tablet Take 1-2 tablets (20-40 mg) by mouth every other day Give 40 mg by mouth  every other day for COPD AND Give 20 mg by mouth very other day HTN, 1/3/2025    gabapentin (NEURONTIN) 400 MG capsule Take 1 capsule (400 mg) by mouth 3 times daily 1/3/2025    hydrocortisone 2.5 % cream Apply topically 2 times daily as needed for itching Past Month    KLOR-CON 20 MEQ CR tablet TAKE 1 TABLET BY MOUTH 2 TIMES DAILY 1/3/2025    levothyroxine (SYNTHROID/LEVOTHROID) 112 MCG tablet Take 1 tablet (112 mcg) by mouth daily. 1/3/2025    Melatonin 10 MG TABS tablet Take 10 mg by mouth nightly as needed Past Month    methocarbamol (ROBAXIN) 500 MG tablet Take 1  tablet (500 mg) by mouth 4 times daily Past Month    metoprolol succinate ER (TOPROL XL) 50 MG 24 hr tablet TAKE 1 TABLET (50 MG) BY MOUTH DAILY (TAKE WITH 25MG TABLET FOR TOTAL 75MG DAILY) (Patient taking differently: Take 50 mg by mouth daily.) 1/3/2025    multivitamin w/minerals (THERA-VIT-M) tablet Take 1 tablet by mouth daily  Past Week    Omega-3 Fatty Acids (FISH OIL MAXIMUM STRENGTH) 1200 MG CPDR Take 1 capsule by mouth. Past Week    oxyCODONE IR (ROXICODONE) 10 MG tablet Take 10 mg by mouth every 4 hours as needed for severe pain. 1/4/2025    predniSONE (DELTASONE) 1 MG tablet TAKE 2 TABLETS (2 MG) BY MOUTH DAILY FOR COPD 1/4/2025    rosuvastatin (CRESTOR) 10 MG tablet TAKE 1 TABLET BY MOUTH EVERY DAY 1/3/2025    senna-docusate (SENOKOT-S/PERICOLACE) 8.6-50 MG tablet Take 1 tablet by mouth 2 times daily Hold for loose stools Past Week    Vitamin D3 (CHOLECALCIFEROL) 25 mcg (1000 units) tablet Take 1 tablet (25 mcg) by mouth daily Past Week

## 2025-01-05 NOTE — PROGRESS NOTES
507 Admission Note    Reason for admission: generalized weakness, sepsis   Primary team notified of pt arrival.  Admitted from:  ED  Via: EMS  Accompanied by: EMS  Belongings: with pt at bedside  Admission Required Doc Completed: Yes  Teaching: Orientation to unit and call light- call light within reach, use of console, meal times, when to call for the RN, and enforced importance of safety.  IV Access: L PIV SL  Telemetry: Yes  Ht./Wt.: Completed  Code Status verified on armband: Yes  2 RN Skin Assessment Completed with: Deepika REYNA  Suction/Ambu bag/Flowmeter at bedside: Yes    Pt status:     Temp:  [97.5  F (36.4  C)-98.5  F (36.9  C)] 97.5  F (36.4  C)  Pulse:  [] 87  Resp:  [18-22] 20  BP: ()/(61-80) 153/77  SpO2:  [93 %-98 %] 96 % 1L NC

## 2025-01-05 NOTE — PLAN OF CARE
Goal Outcome Evaluation:      Plan of Care Reviewed With: patient    Overall Patient Progress: improvingOverall Patient Progress: improving    VS: VSS   O2: O2 WDL on 1L NC.    Output: Continent of B/B. Bedside urinal in use   Last BM: 1/3   Activity: AO1 with GB/walker   Skin: Scattered scabbing/bruises over entire body, scab to 2nd L toe, bruise to center of chest   Pain: Chronic back pain, declined pain meds    CMS: Intact, AOx4   Dressing: none   Diet: regular   LDA: L PIV SL   Equipment: Personal belongings   Plan: Continue POC

## 2025-01-05 NOTE — PROGRESS NOTES
"[facility]    Medicine Progress Note - Hospitalist Service, GOLD TEAM 16    Date of Admission:  1/4/2025    Assessment & Plan     A: Patient is a 78 y/o man who has a past medical history significant for thyroid cancer with lung metastases, Parkinson's disease, neuropathy, glaucoma, osteoporosis, chronic pain, severe COPD, hyperlipidemia, obstructive sleep apnea and coronary artery disease. Patient presented on 04-Jan-2025 with a 7 day history of productive cough and a 1 day history of weakness. CT pulmonary angiogram was negative for pulmonary embolism. Patient appears to have COPD exacerbation but there is concern for community-acquired pneumonia as well. There is also concern that patient was in early sepsis on presentation.     Patient reportedly saw Pulmonology in Oct-2024 and COPD was moderately well-controlled at that time. Activity was apparently limited much more by his balance and parkinsonism than by respiratory status. Patient has antibiotics and prednisone available for outpatient COPD flares but apparently has not recently used this.     Patient apparently just entered hospice in the past week prior to hospital admission. Patient stated on admission that he was too weak and he and his daughter called their hospice agency and 911 was called and he was brought into the hospital. When provider asked on day of presentation whether patient wished for hospitalization and treatment of his potential infection, patient stated that he was seeking treatment and reported that his goal was to \"be able to do things for myself\". It was discussed that hospice typically focused on comfort rather than diagnostic testing and aggressive medical treatment. Patient acknowledged this but seemed unsure of what his true desires for his care are moving forward.     P:  1.) Possible sepsis, improving:  - Supportive care.    2.) Possible community-acquired pneumonia:  - Patient " empirically on ceftriaxone and azithromycin.  - Blood cultures pending.    3.) COPD exacerbation:  - Patient on a 5 day course of prednisone 40 mg daily.  - Patient on scheduled duonebs.  - Albuterol inhaler as needed.  - Trelefy ellipta to be resumed per patient request.     4.) Chronic hypoxemic respiratory failure:  - Patient usually on 1-2 litres per minute of supplemental oxygen at baseline.    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  - Unclear if patient would want further surveillance as outpatient - this depends on goals of care.     6.) Right upper lobe lung mass s/p radiation therapy with possible radiation pneumonitis:  - Patient had previous VATS biopsy in Feb-2022 that was non-diagnostic.    7.) Post-surgical hypothyroidism:  - Patient on levothyroxine 112 mcg daily.    8.) Rheumatoid arthritis with positive rheumatoid factor:  - Per chart review, patient had once been on methotrexate and prednisone but had stopped these medications a few years ago.  - Monitoring for symptoms.    9.) Coronary artery disease with past MI s/p stent placement:  - Patient usually on aspirin and crestor but these are currently on hold.   - Patient usually on metoprolol succinate 50 mg daily and this is being continued.    10.) Hypertension:  - Patient usually on norvasc 5 mg nightly but this is on hold.  - Patient usually on metoprolol succinate 50 mg daily and this is being continued.  - IV hydralazine as needed.    11.) Hyperlipidemia:  - Patient usually on crestor but this is on hold.    12.) Parkinson's disease:  - Patient on sinemet three times a day.    13.) Neuropathy, chronic pain syndrome:  - Patient currently on gabapentin 400 mg three times a day.  - Patient usually on methocarbamol but this is currently on hold.    14.) Glaucoma:  - Patient not currently on medication for this.  - Patient to f/u with Ophthalmology as outpatient if further  treatment is desired.    15.) Osteoporosis:  - Patient on prolia as outpatient.    16.) History of subarachnoid hemorrhage in 2023:  - No active intervention indicated.    17.) Other:  - Patient had recently enrolled in hospice but there might be some discrepancies with patient's goals of care. Palliative care to see.           Diet: Regular Diet Adult    DVT Prophylaxis: Pneumatic compression device ordered.  Mir Catheter: Not present  Lines: None     Cardiac Monitoring: None  Code Status: No CPR- Do NOT Intubate      Clinically Significant Risk Factors Present on Admission           # Hypocalcemia: Lowest Ca = 8.1 mg/dL in last 2 days, will monitor and replace as appropriate       # Drug Induced Platelet Defect: home medication list includes an antiplatelet medication   # Hypertension: Noted on problem list               # Financial/Environmental Concerns:           Social Drivers of Health    Tobacco Use: Medium Risk (10/18/2024)    Patient History     Smoking Tobacco Use: Former     Smokeless Tobacco Use: Never   Physical Activity: Inactive (10/14/2024)    Exercise Vital Sign     Days of Exercise per Week: 0 days     Minutes of Exercise per Session: 0 min   Stress: Stress Concern Present (10/14/2024)    [employer] - Occupational Stress Questionnaire     Feeling of Stress : Very much   Social Connections: Unknown (10/14/2024)    Social Connection and Isolation Panel [NHANES]     Frequency of Social Gatherings with Friends and Family: More than three times a week          Disposition Plan     Medically Ready for Discharge: Anticipated in 2-4 Days             Gonzalez Watt MD  Hospitalist Service, GOLD TEAM [address] [place] [address]  Securely message with [employer] (more info)  Text page via [employer] Paging/Directory   See signed in provider for up to date coverage  information  ______________________________________________________________________    Interval History   Patient noted dyspnea, cough an wheezing improved. Patient noted no fever.    Physical Exam   Vital Signs: Temp: 97.3  F (36.3  C) Temp src: Oral BP: (!) 172/83 Pulse: 87   Resp: 16 SpO2: 96 % O2 Device: Nasal cannula Oxygen Delivery: 1 LPM    General: Patient comfortable, NAD.  Heart: RRR, S1 S2 w/o murmurs.  Lungs: Breath sounds diminished with diffuse wheezing.    Labs noted.  Sodium 145; Potassium 4.1; Creatinine 0.72;  Lactic acid 3.2;  WBC 11.3; Hb 15.0; Platelets 60;

## 2025-01-05 NOTE — CONSULTS
Care Management Initial Consult    General Information  Assessment completed with: Patient, VM-chart review,    Type of CM/SW Visit: Initial Assessment    Primary Care Provider verified and updated as needed: Yes   Readmission within the last 30 days: no previous admission in last 30 days      Reason for Consult: discharge planning, other (see comments) (elevated risk score)  Advance Care Planning: Advance Care Planning Reviewed: present on chart, verified with patient          Communication Assessment  Patient's communication style: spoken language (English or Bilingual)             Cognitive  Cognitive/Neuro/Behavioral: WDL                      Living Environment:   People in home: child(yohan), adult, grandchild(yohan)     Current living Arrangements: house      Able to return to prior arrangements: yes       Family/Social Support:  Care provided by: self, child(yohan) (adult daughter)  Provides care for: no one     Support system:            Description of Support System:           Current Resources:   Patient receiving home care services: Yes  Skilled Home Care Services: Skilled Nursing     Community Resources: None  Equipment currently used at home: walker, standard, hospital bed  Supplies currently used at home: Oxygen Tubing/Supplies    Employment/Financial:  Employment Status: retired        Financial Concerns:             Does the patient's insurance plan have a 3 day qualifying hospital stay waiver?  Yes     Which insurance plan 3 day waiver is available? [employer]    Will the waiver be used for post-acute placement? Undetermined at this time    Lifestyle & Psychosocial Needs:  Social Drivers of Health     Food Insecurity: Low Risk  (10/14/2024)    Food Insecurity     Within the past 12 months, did you worry that your food would run out before you got money to buy more?: No     Within the past 12 months, did the food you bought just not last and you didn t have money to get more?: No   Depression: Not at risk  (10/18/2024)    PHQ-2     PHQ-2 Score: 1   Housing Stability: Low Risk  (10/14/2024)    Housing Stability     Do you have housing? : Yes     Are you worried about losing your housing?: No   Tobacco Use: Medium Risk (10/18/2024)    Patient History     Smoking Tobacco Use: Former     Smokeless Tobacco Use: Never     Passive Exposure: Not on file   Financial Resource Strain: Low Risk  (10/14/2024)    Financial Resource Strain     Within the past 12 months, have you or your family members you live with been unable to get utilities (heat, electricity) when it was really needed?: No   Alcohol Use: Not on file   Transportation Needs: Low Risk  (10/14/2024)    Transportation Needs     Within the past 12 months, has lack of transportation kept you from medical appointments, getting your medicines, non-medical meetings or appointments, work, or from getting things that you need?: No   Physical Activity: Inactive (10/14/2024)    Exercise Vital Sign     Days of Exercise per Week: 0 days     Minutes of Exercise per Session: 0 min   Interpersonal Safety: Low Risk  (5/24/2024)    Interpersonal Safety     Do you feel physically and emotionally safe where you currently live?: Yes     Within the past 12 months, have you been hit, slapped, kicked or otherwise physically hurt by someone?: No     Within the past 12 months, have you been humiliated or emotionally abused in other ways by your partner or ex-partner?: No   Stress: Stress Concern Present (10/14/2024)    [employer] - Occupational Stress Questionnaire     Feeling of Stress : Very much   Social Connections: Unknown (10/14/2024)    Social Connection and Isolation Panel [NHANES]     Frequency of Communication with Friends and Family: Not on file     Frequency of Social Gatherings with Friends and Family: More than three times a week     Attends Latter-day Services: Not on file     Active Member of Clubs or Organizations: Not on file     Attends Club  or Organization Meetings: Not on file     Marital Status: Not on file   Health Literacy: Not on file       Functional Status:  Prior to admission patient needed assistance:   Dependent ADLs:: Ambulation-walker  Dependent IADLs:: Cleaning, Cooking, Laundry, Meal Preparation       Mental Health Status:  Mental Health Status: No Current Concerns       Chemical Dependency Status:  Chemical Dependency Status: No Current Concerns             Values/Beliefs:  Spiritual, Cultural Beliefs, Religion Practices, Values that affect care: no               Discussed  Partnership in Safe Discharge Planning  document with patient/family: Yes    Additional Information:  Care management consulted due to elevated risk score.  Care management assessment completed at bedside with patient after chart review.     Patient reports he is currently on home hospice with services provided by [facility].  Referral completed to [facility] hospice to update on patient status.  He reports wanting to discharge home with home hospice services continue.  Family to transport at discharge.     Next Steps: medical stability, discharge IMM    Jerry Cardona RN    1/5/2025  Nurse Coordinator      Social Work and Care Management Department       SEARCHABLE in [facility] - search CARE COORDINATOR       [place] ([phone]) Saturday & Sunday; ([phone]) [facility] Recognized Holidays     Units: 5A Onc 5201 - [phone] RNCC,  5A Onc 5220 thru 5240 RNCC, 5C OFFSERVICE [phone] RNCC & 5C OFF SERVICE [phone] RNCC       Units: 6B Vocera, 6C Card 6401 thru 6420 RNCC, 6C Card 6502 thru 6514 RNCC & 6C Card 6515 thru 6519 RNCC        Units: 7A SOT RNCC Vocera, 7B Med Surg Vocera, 7C Med Surg 7401 thru 7418 RNCC & 7C Med Surg 7502 thru 7521 RNCC       Units: 6A Vocera & 4A CVICU Vocera, 4C MICU Vocera, and 4E SICU Vocera         Units: 5 Ortho Vocera & 5 Med Surg Vocera        Units: 6 Med Surg Vocera & 8 Med Surg Vocera

## 2025-01-05 NOTE — PLAN OF CARE
Goal Outcome Evaluation:      Plan of Care Reviewed With: patient    Overall Patient Progress: no changeOverall Patient Progress: no change    Outcome Evaluation: Pt AOx4, continues reporting generalized weakness and slight SOB on exertion, on 1 L O2 via NC w/ sched nebs. Lung sounds coarse bilateral, starting IV antibiotics, continuing telemetry.    Output: Voids spontaneously, denies difficulties    Last BM: 1/3   Activity: Ax1, w/ walker and GB   Skin: Scattered bruising to BUE, dry, flaky skin, 2nd L toe scab    Pain: Generalized back pain reported, prn tylenol given   Dressing: N/A   Diet: Reg, denies nausea and vomiting    LDA: R PIV SL in between abx    Plan: Continue POC for IV abx, monitor for signs of sepsis,    Additional Info:

## 2025-01-05 NOTE — PLAN OF CARE
Goal Outcome Evaluation:      Plan of Care Reviewed With: patient          Outcome Evaluation: Patient plans to discharge home with resumption of home hospice care

## 2025-01-05 NOTE — PROVIDER NOTIFICATION
ED [id number]  Can pt be transported without tele? TRANSPORT HERE AND THEY ARE ONLY bls.   Per provider Coni Bustos stated ok to transport without tele

## 2025-01-06 LAB
ANION GAP SERPL CALCULATED.3IONS-SCNC: 9 MMOL/L (ref 7–15)
BASOPHILS # BLD MANUAL: 0 10E3/UL (ref 0–0.2)
BASOPHILS NFR BLD MANUAL: 0 %
BUN SERPL-MCNC: 13.5 MG/DL (ref 8–23)
CALCIUM SERPL-MCNC: 8 MG/DL (ref 8.8–10.4)
CHLORIDE SERPL-SCNC: 107 MMOL/L (ref 98–107)
CREAT SERPL-MCNC: 0.63 MG/DL (ref 0.67–1.17)
EGFRCR SERPLBLD CKD-EPI 2021: >90 ML/MIN/1.73M2
EOSINOPHIL # BLD MANUAL: 0 10E3/UL (ref 0–0.7)
EOSINOPHIL NFR BLD MANUAL: 0 %
ERYTHROCYTE [DISTWIDTH] IN BLOOD BY AUTOMATED COUNT: 13.6 % (ref 10–15)
GLUCOSE SERPL-MCNC: 78 MG/DL (ref 70–99)
HCO3 SERPL-SCNC: 26 MMOL/L (ref 22–29)
HCT VFR BLD AUTO: 41.9 % (ref 40–53)
HGB BLD-MCNC: 14 G/DL (ref 13.3–17.7)
LYMPHOCYTES # BLD MANUAL: 0.8 10E3/UL (ref 0.8–5.3)
LYMPHOCYTES NFR BLD MANUAL: 9 %
MCH RBC QN AUTO: 31.4 PG (ref 26.5–33)
MCHC RBC AUTO-ENTMCNC: 33.4 G/DL (ref 31.5–36.5)
MCV RBC AUTO: 94 FL (ref 78–100)
METAMYELOCYTES # BLD MANUAL: 0.2 10E3/UL
METAMYELOCYTES NFR BLD MANUAL: 2 %
MONOCYTES # BLD MANUAL: 0.7 10E3/UL (ref 0–1.3)
MONOCYTES NFR BLD MANUAL: 8 %
MYELOCYTES # BLD MANUAL: 0.1 10E3/UL
MYELOCYTES NFR BLD MANUAL: 1 %
NEUTROPHILS # BLD MANUAL: 7.3 10E3/UL (ref 1.6–8.3)
NEUTROPHILS NFR BLD MANUAL: 80 %
PLAT MORPH BLD: ABNORMAL
PLATELET # BLD AUTO: 59 10E3/UL (ref 150–450)
POTASSIUM SERPL-SCNC: 3.7 MMOL/L (ref 3.4–5.3)
RBC # BLD AUTO: 4.46 10E6/UL (ref 4.4–5.9)
RBC MORPH BLD: ABNORMAL
SODIUM SERPL-SCNC: 142 MMOL/L (ref 135–145)
WBC # BLD AUTO: 9 10E3/UL (ref 4–11)

## 2025-01-06 PROCEDURE — 250N000013 HC RX MED GY IP 250 OP 250 PS 637: Performed by: CLINICAL NURSE SPECIALIST

## 2025-01-06 PROCEDURE — 99418 PROLNG IP/OBS E/M EA 15 MIN: CPT | Performed by: CLINICAL NURSE SPECIALIST

## 2025-01-06 PROCEDURE — 94640 AIRWAY INHALATION TREATMENT: CPT

## 2025-01-06 PROCEDURE — 120N000002 HC R&B MED SURG/OB UMMC

## 2025-01-06 PROCEDURE — 85027 COMPLETE CBC AUTOMATED: CPT

## 2025-01-06 PROCEDURE — 99232 SBSQ HOSP IP/OBS MODERATE 35: CPT | Performed by: INTERNAL MEDICINE

## 2025-01-06 PROCEDURE — 250N000012 HC RX MED GY IP 250 OP 636 PS 637

## 2025-01-06 PROCEDURE — 82565 ASSAY OF CREATININE: CPT

## 2025-01-06 PROCEDURE — 250N000013 HC RX MED GY IP 250 OP 250 PS 637

## 2025-01-06 PROCEDURE — 250N000011 HC RX IP 250 OP 636

## 2025-01-06 PROCEDURE — 250N000013 HC RX MED GY IP 250 OP 250 PS 637: Performed by: STUDENT IN AN ORGANIZED HEALTH CARE EDUCATION/TRAINING PROGRAM

## 2025-01-06 PROCEDURE — 99223 1ST HOSP IP/OBS HIGH 75: CPT | Performed by: CLINICAL NURSE SPECIALIST

## 2025-01-06 PROCEDURE — 250N000009 HC RX 250

## 2025-01-06 PROCEDURE — 85007 BL SMEAR W/DIFF WBC COUNT: CPT

## 2025-01-06 PROCEDURE — 82435 ASSAY OF BLOOD CHLORIDE: CPT

## 2025-01-06 PROCEDURE — 36415 COLL VENOUS BLD VENIPUNCTURE: CPT

## 2025-01-06 PROCEDURE — 250N000011 HC RX IP 250 OP 636: Performed by: STUDENT IN AN ORGANIZED HEALTH CARE EDUCATION/TRAINING PROGRAM

## 2025-01-06 PROCEDURE — 258N000003 HC RX IP 258 OP 636: Performed by: STUDENT IN AN ORGANIZED HEALTH CARE EDUCATION/TRAINING PROGRAM

## 2025-01-06 PROCEDURE — 94640 AIRWAY INHALATION TREATMENT: CPT | Mod: 76

## 2025-01-06 RX ORDER — MORPHINE SULFATE 20 MG/ML
5 SOLUTION ORAL 3 TIMES DAILY
Status: DISCONTINUED | OUTPATIENT
Start: 2025-01-06 | End: 2025-01-07

## 2025-01-06 RX ORDER — MORPHINE SULFATE 20 MG/ML
5 SOLUTION ORAL
Status: DISCONTINUED | OUTPATIENT
Start: 2025-01-06 | End: 2025-01-06

## 2025-01-06 RX ORDER — MORPHINE SULFATE 20 MG/ML
10 SOLUTION ORAL
Status: DISCONTINUED | OUTPATIENT
Start: 2025-01-06 | End: 2025-01-07

## 2025-01-06 RX ORDER — MORPHINE SULFATE 20 MG/ML
5 SOLUTION ORAL
Status: DISCONTINUED | OUTPATIENT
Start: 2025-01-06 | End: 2025-01-07

## 2025-01-06 RX ORDER — MORPHINE SULFATE 20 MG/ML
2.5 SOLUTION ORAL
Status: DISCONTINUED | OUTPATIENT
Start: 2025-01-06 | End: 2025-01-06

## 2025-01-06 RX ORDER — MORPHINE SULFATE 20 MG/ML
2.5 SOLUTION ORAL 3 TIMES DAILY
Status: DISCONTINUED | OUTPATIENT
Start: 2025-01-06 | End: 2025-01-06

## 2025-01-06 RX ADMIN — Medication 1 HALF-TAB: at 09:48

## 2025-01-06 RX ADMIN — GABAPENTIN 400 MG: 300 CAPSULE ORAL at 13:30

## 2025-01-06 RX ADMIN — Medication 1 HALF-TAB: at 20:09

## 2025-01-06 RX ADMIN — METOPROLOL SUCCINATE 50 MG: 50 TABLET, EXTENDED RELEASE ORAL at 09:50

## 2025-01-06 RX ADMIN — CARBIDOPA AND LEVODOPA 1 TABLET: 25; 100 TABLET ORAL at 20:09

## 2025-01-06 RX ADMIN — IPRATROPIUM BROMIDE AND ALBUTEROL SULFATE 3 ML: .5; 3 SOLUTION RESPIRATORY (INHALATION) at 07:56

## 2025-01-06 RX ADMIN — AZITHROMYCIN MONOHYDRATE 500 MG: 500 INJECTION, POWDER, LYOPHILIZED, FOR SOLUTION INTRAVENOUS at 16:40

## 2025-01-06 RX ADMIN — CARBIDOPA AND LEVODOPA 1 TABLET: 25; 100 TABLET ORAL at 13:31

## 2025-01-06 RX ADMIN — IPRATROPIUM BROMIDE AND ALBUTEROL SULFATE 3 ML: .5; 3 SOLUTION RESPIRATORY (INHALATION) at 21:44

## 2025-01-06 RX ADMIN — LEVOTHYROXINE SODIUM 112 MCG: 112 TABLET ORAL at 09:48

## 2025-01-06 RX ADMIN — GABAPENTIN 400 MG: 300 CAPSULE ORAL at 09:48

## 2025-01-06 RX ADMIN — IPRATROPIUM BROMIDE AND ALBUTEROL SULFATE 3 ML: .5; 3 SOLUTION RESPIRATORY (INHALATION) at 16:27

## 2025-01-06 RX ADMIN — MORPHINE SULFATE 5 MG: 20 SOLUTION ORAL at 16:39

## 2025-01-06 RX ADMIN — MORPHINE SULFATE 5 MG: 20 SOLUTION ORAL at 20:09

## 2025-01-06 RX ADMIN — CARBIDOPA AND LEVODOPA 1 TABLET: 25; 100 TABLET ORAL at 09:49

## 2025-01-06 RX ADMIN — CEFTRIAXONE SODIUM 2 G: 2 INJECTION, POWDER, FOR SOLUTION INTRAMUSCULAR; INTRAVENOUS at 13:31

## 2025-01-06 RX ADMIN — GABAPENTIN 400 MG: 300 CAPSULE ORAL at 20:08

## 2025-01-06 RX ADMIN — IPRATROPIUM BROMIDE AND ALBUTEROL SULFATE 3 ML: .5; 3 SOLUTION RESPIRATORY (INHALATION) at 12:40

## 2025-01-06 RX ADMIN — PREDNISONE 40 MG: 20 TABLET ORAL at 09:48

## 2025-01-06 RX ADMIN — Medication 1 HALF-TAB: at 13:31

## 2025-01-06 ASSESSMENT — ACTIVITIES OF DAILY LIVING (ADL)
ADLS_ACUITY_SCORE: 39
ADLS_ACUITY_SCORE: 40
ADLS_ACUITY_SCORE: 39
ADLS_ACUITY_SCORE: 39
ADLS_ACUITY_SCORE: 40
ADLS_ACUITY_SCORE: 39
ADLS_ACUITY_SCORE: 40
ADLS_ACUITY_SCORE: 39
ADLS_ACUITY_SCORE: 40
ADLS_ACUITY_SCORE: 39

## 2025-01-06 NOTE — PROGRESS NOTES
"[facility]    Medicine Progress Note - Hospitalist Service, GOLD TEAM 16    Date of Admission:  1/4/2025    Assessment & Plan   A: Patient is a 78 y/o man who has a past medical history significant for thyroid cancer with lung metastases, Parkinson's disease, neuropathy, glaucoma, osteoporosis, chronic pain, severe COPD, hyperlipidemia, obstructive sleep apnea and coronary artery disease. Patient presented on 04-Jan-2025 with a 7 day history of productive cough and a 1 day history of weakness. CT pulmonary angiogram was negative for pulmonary embolism. Patient appears to have COPD exacerbation but there is concern for community-acquired pneumonia as well. There is also concern that patient was in early sepsis on presentation.      Patient reportedly saw Pulmonology in Oct-2024 and COPD was moderately well-controlled at that time. Activity was apparently limited much more by his balance and parkinsonism than by respiratory status. Patient has antibiotics and prednisone available for outpatient COPD flares but apparently has not recently used this.      Patient apparently just entered hospice in the past week prior to hospital admission. Patient stated on admission that he was too weak and he and his daughter called their hospice agency and 911 was called and he was brought into the hospital. When provider asked on day of presentation whether patient wished for hospitalization and treatment of his potential infection, patient stated that he was seeking treatment and reported that his goal was to \"be able to do things for myself\". It was discussed that hospice typically focused on comfort rather than diagnostic testing and aggressive medical treatment. Patient acknowledged this but seemed unsure of what his true desires for his care are moving forward.      P:  1.) Possible sepsis, resolving:  - Supportive care.     2.) Possible community-acquired pneumonia:  - Patient " empirically on ceftriaxone and azithromycin. Patient on day 3 of antibiotics. Planning a 5 day antibiotic course.   - Blood cultures pending.     3.) COPD exacerbation:  - Patient on a day 3 of a 5 day course of prednisone 40 mg daily.  - If symptoms persist, prednisone course might need to be extended.   - Patient on scheduled duonebs.  - Albuterol inhaler as needed.  - Trelefy ellipta to be resumed per patient request.      4.) Chronic hypoxemic respiratory failure:  - Patient usually on 1-2 litres per minute of supplemental oxygen at baseline.     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  - Unclear if patient would want further surveillance as outpatient - this depends on goals of care.      6.) Right upper lobe lung mass s/p radiation therapy with possible radiation pneumonitis:  - Patient had previous VATS biopsy in Feb-2022 that was non-diagnostic.     7.) Post-surgical hypothyroidism:  - Patient on levothyroxine 112 mcg daily.     8.) Rheumatoid arthritis with positive rheumatoid factor:  - Per chart review, patient had once been on methotrexate and prednisone but had stopped these medications a few years ago.  - Monitoring for symptoms.     9.) Coronary artery disease with past MI s/p stent placement:  - Patient usually on aspirin and crestor but these are currently on hold.   - Patient usually on metoprolol succinate 50 mg daily and this is being continued.     10.) Hypertension:  - Patient usually on norvasc 5 mg nightly but this is on hold.  - Patient usually on metoprolol succinate 50 mg daily and this is being continued.  - IV hydralazine as needed.     11.) Hyperlipidemia:  - Patient usually on crestor but this is on hold.     12.) Parkinson's disease:  - Patient on sinemet three times a day.     13.) Neuropathy, chronic pain syndrome:  - Patient currently on gabapentin 400 mg three times a day.  - Patient usually on  methocarbamol but this is currently on hold.     14.) Glaucoma:  - Patient not currently on medication for this.  - Patient to f/u with Ophthalmology as outpatient if further treatment is desired.     15.) Osteoporosis:  - Patient on prolia as outpatient.     16.) History of subarachnoid hemorrhage in 2023:  - No active intervention indicated.     17.) Acute thrombocytopenia:  - Monitoring labs.  - Monitoring for bleeding.    18.) Other:  - Patient had recently enrolled in hospice. Palliative care saw patient.           Diet: Regular Diet Adult    Mir Catheter: Not present  Lines: None     Cardiac Monitoring: None  Code Status: No CPR- Do NOT Intubate      Clinically Significant Risk Factors           # Hypocalcemia: Lowest Ca = 8 mg/dL in last 2 days, will monitor and replace as appropriate       # Thrombocytopenia: Lowest platelets = 50 in last 2 days, will monitor for bleeding   # Hypertension: Noted on problem list                # Financial/Environmental Concerns:           Social Drivers of Health    Tobacco Use: Medium Risk (10/18/2024)    Patient History     Smoking Tobacco Use: Former     Smokeless Tobacco Use: Never   Physical Activity: Inactive (10/14/2024)    Exercise Vital Sign     Days of Exercise per Week: 0 days     Minutes of Exercise per Session: 0 min   Stress: Stress Concern Present (10/14/2024)    [employer] - Occupational Stress Questionnaire     Feeling of Stress : Very much   Social Connections: Unknown (10/14/2024)    Social Connection and Isolation Panel [NHANES]     Frequency of Social Gatherings with Friends and Family: More than three times a week          Disposition Plan     Medically Ready for Discharge: Anticipated in 2-4 Days             Gonzalez Watt MD  Hospitalist Service, GOLD TEAM 16  [facility]  Securely message with [email or web address] (more info)  Text page via [employer] Paging/Directory   See signed in provider for  up to date coverage information  ______________________________________________________________________    Interval History   Patient noted continued dyspnea and cough. Patient noted sometimes having difficulty completing a sentence.     Physical Exam   Vital Signs: Temp: 98.4  F (36.9  C) Temp src: Oral BP: (!) 170/82 Pulse: 68   Resp: 16 SpO2: 97 % O2 Device: None (Room air) Oxygen Delivery: 1.5 LPM  Weight: 157 lbs 14.4 oz    General: Patient comfortable, NAD. Patient sometimes need to pause to breathe while speaking.  Heart: RRR, S1 S2 w/o murmurs.  Lungs: Breath sounds present with some wheezing.     Labs noted.  Sodium 142; Potassium 3.7; Creatinine 0.63;  WBC 9.0; Hb 14.0; Platelets 59;

## 2025-01-06 NOTE — PROGRESS NOTES
Care Management Follow Up    Length of Stay (days): 2    Expected Discharge Date: 01/06/2025     Concerns to be Addressed: discharge planning     Patient plan of care discussed at interdisciplinary rounds: Yes    Anticipated Discharge Disposition: Home, Hospice        Anticipated Discharge Services: Other (see comment) (current on home hospice)  Anticipated Discharge DME: None    Patient/family educated on Medicare website which has current facility and service quality ratings:    Education Provided on the Discharge Plan: Yes  Patient/Family in Agreement with the Plan:      Referrals Placed by CM/SW: Homecare (currently on home hospice)  Private pay costs discussed: Not applicable    Discussed  Partnership in Safe Discharge Planning  document with patient/family: No     Handoff Completed: No, handoff not indicated or clinically appropriate    Additional Information:  JARAD met with pt and pt  (Jorge Cooper, ph: [phone]) at bedside. oJrge explained that pt had a court hearing this afternoon around 1600. Jorge explained this would be a short hearing and was able to be completed over Zoom. Jorge brought a laptop and intended to assist with setting pt up for this at bedside. Jorge reported that pt has an additional court hearing on Friday, 1/10 where he would be expected to be in person. Jorge asked when pt would be discharged from hospital and JARAD noted that SW was not sure but could follow up with medical team on anticipated discharge date. Jorge asked if it would be possible to get a brief letter from doctor to bring to court if pt was still hospitalized on Friday stating that pt was hospitalized and unable to be in court. JARAD confirmed that obtaining doctor's letter if pt is still hospitalized should be possible. Pt noted that he was not sure if he would have the strength to go to court even if he was discharged on Friday. Jorge reported that he would come to hospital tomorrow to obtain letter, if needed, but noted  that they would need a letter by tomorrow at the latest.     JARAD paged Gold 16 Provider (Dr. Watt) to inquire about anticipated discharge date. G16 thinking pt may be able to discharge before Friday but requested CM team follow up with provider tomorrow.     JARAD informed pt and Jorge at bedside that pt's discharge date would be reevaluated tomorrow. Pt reiterated that he did not feel he would be able to physically attend court on Friday even if discharged. Jorge reported that they could move the court date with a doctor's letter if needed. CM team to follow up with pt and Jorge then.     Next Steps: JARAD team to follow up with G16 tomorrow about anticipated discharge date and letter for court.     Pt lawyer Sandy Cooper  Ph: [phone]  Email: [email or web address]    KIARA Samayoa   [facility]   Available via [employer]  Covering 5 Ortho JARAD, ph: [phone]

## 2025-01-06 NOTE — PLAN OF CARE
Goal Outcome Evaluation:      Plan of Care Reviewed With: patient    Overall Patient Progress: improvingOverall Patient Progress: improving       VS: VSS   O2: O2 WDL on RA. Denies SOB/CP   Output: Continent of b/b   Last BM: 1/5 per patient    Activity: AO1, SBA with walker   Skin: Scattered bruising and scabbing    Pain: chronic low back pain, declined pain meds, ice and pillow support provided    CMS: A&Ox4. Denies N/T.    Dressing: Dressing to L hand scab   Diet: regular   LDA: R PIV SL   Equipment: IV pole, personal belongings   Plan: IV abx, monitor for signs of sepsis

## 2025-01-06 NOTE — CONSULTS
Palliative Care Consultation Note  [facility]      Patient: Harrison Thomas  Date of Admission:  1/4/2025    Requesting Clinician / Team: Gonzalez Watt MD   Reason for consult: Goals of care       Recommendations & Counseling     GOALS OF CARE:   DNR./DNI, not sure if he wants to keep coming back to the hospital  He did want to continue current treatment in the hospital, I informed him he doesn't have to use antibiotics if he wants to focus on quality over quantity.  He does very much like the hospice team and wants to continue to work with them  Would plan for discharge with hospice but he does not have a 24/7 care giver and daughter has concerns about how to manage his needs ongoing. Appreciate unit SW support in addressing this.  Would also benefit from updated HCD if able. Appreciate unit SW support with this as well.  Doesn't appear to meet criteria for [facility] hospice facility but would consider as he progresses.    ADVANCE CARE PLANNING:  Patient has an advance directive dated 3/13/2023.  Primary Health Care Agent Adeola LOCKHART William- spouse but currently divorce.  Alternate(s) daughter- Janey.  He would not want to involve his wife anymore and would want Janey to support him with decision making.  There is a POLST form on file, but will need to be updated prior to discharge.  Code status: No CPR- Do NOT Intubate    MEDICAL MANAGEMENT:   #Pain,chronic: Usually takes 10 mg oxycodone 4 times a day.   Stopped oxycodone and using morphine alone to treat symptoms  Placed orders for morphine 5 mg TID scheduled, can treat baseline pain and hope would be to also treat some of his baseline dyspnea     #Dyspnea,COPD: dyspneic in bed and with talking.   Fan at bedside  Placed orders for morphine 5-10 mg q2h PRN    #At risk for OIC: last BM was yesterday.  Consider adding 1/2 dose of miralax if no BM in 2 days       PSYCHOSOCIAL/SPIRITUAL SUPPORT:  Family daughter  Janey.    Palliative Care will continue to follow. Thank you for the consult and allowing us to aid in the care of Harrison Thomas.    These recommendations have been discussed with primary team.    PREETI Moreira CNS  [facility], Palliative Care  Securely message with the [employer] Web Console (learn more here) or  Text page via [facility] Paging/Directory         Assessment      Harrison Thomas is a 79 year old male with a past medical history of thyroid cancer with lung metastases, parkinson's disease, neuropathy, glaucoma, osteoporosis, chronic pain, severe COPD, HLD, BRENNEN, CAD who presented on 1/4 with a 7 day history of productive cough and 1 day history of weakness. Upon admission CT pulmonary angio done and was negative for PE, treated for COPD exacerbation and PNA. Prior to admission he enrolled in hospice however presented to the hospital after being unable to reach hospice and then needed to call 911.     Today, the patient was seen for:  Severe COPD  History of thyroid cancer with lung mets  Parkinson's disease    History of Present Illness   Met with patient and daughter separately today.   Introduced the role of palliative care as an interdisciplinary team that cares for patients with serious illness to help support symptom management, communication, coping for patients and their families as well as support with medical decision making.    Prognosis, Goals, & Planning:   Functional Status just prior to this current hospitalization:  ECOG2 (Ambulatory and capable of all selfcare but unable to carry out any work activities; may need help with IADLs up and about > 50% of waking hours)  3 (Capable of only limited self-care; needs help with ADLs; in bed/chair >50% of waking hours)    Prognosis, Goals, and/or Advance Care Planning:  We discussed general treatment options (full/restorative, selective/conservatives, and comfort only/hospice). We then discussed how these specifically apply to Tristan   Based on this discussion, Jake has decided to continue with his DNR/DNI and wants to continue to discuss his goals of care with his daughter. We discussed at length his chronic illnesses including his COPD and parkinson's, his lung disease seeming to be the most severe condition right now. And he presented to the hospital with increased weakness. And discussed with him possibly a PNA causing some weakness however concern that his weakness is mostly being driven by progression of his COPD and parkinson's. Discussed with him his hopes and he does want to get more functional including walking. I shared my concern this maybe not happening due to how long its been. He said its been a while since he would walk the length that matters to his QOL. Discussed if he was not able to obtain his walking goals due to his lung condition limiting him. Discussed if he would want to come back to the hospital and he wasn't sure if he would or would not, he feels like it will depend upon the situation. He does feel awful enough at times to think that there should just be a shot that we can give him to kill him. Affirmed his feelings and discussed we are not able to provide any medication intended to kill him. Discussed the ways in which he can be helped with improved symptom management and emotional support. Discussed with him also with his disease progression that weakness and resulting increased support at home will be needed. Discussed needing to review this with his daughter to give her thoughts. Suggested if home not safe enough needing to consider increased support with MAGDA versus LTC. His biggest concern with this is financial. Acknowledged this concern and discussed possibly resources to explore with unit SW.  Education provided regarding hospice philosophy, prognostic,and eligibility criteria. Discussed what services are provided and those that are not,  Discussed common misconceptions. We explored the various disposition  options where they can receive hospice care (home, residential hospice homes, LTC with hospice) including subsequent financial and familial implications. Discussed typical anticipated timing of discharge.  Spoke with daughter today, she is in agreement that hospice makes the most sense, he has been on and off hospice now a couple of times. His emotions at times can be challenging and influence his decision making. Discussed with her how things were going, seems that he was able to do stairs until the day prior to this hospitalization, and he was taking care of most of his needs and only needed help with meals. Discussed my conversation with him and discussed his wishes and lack of clarity that he fully wants hospice care philosophy, discussed it being very appropriate however. Discussed ways to manage his symptoms at home including morphine and lorazepam, and also haldol. She was not able to find his emergency kit and it was empty. She is not sure where he put it. Discussed concern that he has gotten multiple medications opioids and benzos and ensuring his safety with these. She is worried at times he is stock piling medications. We discussed his safety at home and starting to explore if he has more needs that need a new location. It seems at this time its worth to looking into where he could be safely cared for as we anticipate his needs changing more in the coming weeks-months.    Code Status was addressed today:   Yes, We discussed potential risks and rationale of attempting cardiac resuscitation, intubation, and mechanical ventilation.  We also discussed probability of survival as well as quality of life implications.  Based on this discussion, patient or surrogate response/decision: DNR/DNI      Patient's decision making preferences: shared with support from loved ones        Patient has decision-making capacity today for complex decisions: Intact          Coping, Meaning, & Spirituality:   Mood, coping, and/or  meaning in the context of serious illness were addressed today: Yes lots going on. Lots of changes. Wants to get stronger, understands the reality that might not happen.     Social:   Living situation:lives with family  Areas of fulfillment/eliza: used to fish and hunt.     Medications:  Reviewed this patient's medication profile and medications from this hospitalization. Notable medications:  Sinemet TID   Gabapentin 400 mg TID    [place] Board of Pharmacy Data Base Reviewed: Yes:   reviewed - controlled substances prescribed by other outside provider(s).     ROS:  Comprehensive ROS is reviewed and is negative except as here & per HPI:     Physical Exam   Vital Signs with Ranges  Temp:  [97.8  F (36.6  C)-98.6  F (37  C)] 98.4  F (36.9  C)  Pulse:  [68-79] 68  Resp:  [16-24] 16  BP: (138-170)/(63-82) 170/82  SpO2:  [96 %-98 %] 97 %  Wt Readings from Last 10 Encounters:   01/06/25 71.6 kg (157 lb 14.4 oz)   10/24/24 71.5 kg (157 lb 9.6 oz)   10/18/24 70.8 kg (156 lb)   09/23/24 70.9 kg (156 lb 6 oz)   09/20/24 70.8 kg (156 lb)   07/18/24 73 kg (161 lb)   06/19/24 73 kg (161 lb)   05/30/24 70.8 kg (156 lb)   05/14/24 77.1 kg (170 lb)   05/10/24 77.1 kg (170 lb)     157 lbs 14.4 oz    PHYSICAL EXAM:  Constitutional: Awake, alert, cooperative, chronically ill appearing, no apparent distress  Lungs: some increased work of breathing, good air exchange  Neurologic: Awake, alert, oriented to name, place and time.  Neuropsychiatric: Normal affect, mood, orientation, memory and insight.  Skin: No rashes, erythema      Data reviewed:  Results for orders placed or performed during the hospital encounter of 01/04/25 (from the past 24 hours)   CBC with Platelets & Differential    Narrative    The following orders were created for panel order CBC with Platelets & Differential.  Procedure                               Abnormality         Status                     ---------                               -----------          ------                     CBC with platelets and d...[[id number]]  Abnormal            Final result               Manual Differential[[id number]]          Abnormal            Final result                 Please view results for these tests on the individual orders.   Basic metabolic panel   Result Value Ref Range    Sodium 142 135 - 145 mmol/L    Potassium 3.7 3.4 - 5.3 mmol/L    Chloride 107 98 - 107 mmol/L    Carbon Dioxide (CO2) 26 22 - 29 mmol/L    Anion Gap 9 7 - 15 mmol/L    Urea Nitrogen 13.5 8.0 - 23.0 mg/dL    Creatinine 0.63 (L) 0.67 - 1.17 mg/dL    GFR Estimate >90 >60 mL/min/1.73m2    Calcium 8.0 (L) 8.8 - 10.4 mg/dL    Glucose 78 70 - 99 mg/dL   CBC with platelets and differential   Result Value Ref Range    WBC Count 9.0 4.0 - 11.0 10e3/uL    RBC Count 4.46 4.40 - 5.90 10e6/uL    Hemoglobin 14.0 13.3 - 17.7 g/dL    Hematocrit 41.9 40.0 - 53.0 %    MCV 94 78 - 100 fL    MCH 31.4 26.5 - 33.0 pg    MCHC 33.4 31.5 - 36.5 g/dL    RDW 13.6 10.0 - 15.0 %    Platelet Count 59 (L) 150 - 450 10e3/uL   Manual Differential   Result Value Ref Range    % Neutrophils 80 %    % Lymphocytes 9 %    % Monocytes 8 %    % Eosinophils 0 %    % Basophils 0 %    % Metamyelocytes 2 %    % Myelocytes 1 %    Absolute Neutrophils 7.3 1.6 - 8.3 10e3/uL    Absolute Lymphocytes 0.8 0.8 - 5.3 10e3/uL    Absolute Monocytes 0.7 0.0 - 1.3 10e3/uL    Absolute Eosinophils 0.0 0.0 - 0.7 10e3/uL    Absolute Basophils 0.0 0.0 - 0.2 10e3/uL    Absolute Metamyelocytes 0.2 (H) <=0.0 10e3/uL    Absolute Myelocytes 0.1 (H) <=0.0 10e3/uL    RBC Morphology Confirmed RBC Indices     Platelet Assessment  Automated Count Confirmed. Platelet morphology is normal.     Automated Count Confirmed. Platelet morphology is normal.     *Note: Due to a large number of results and/or encounters for the requested time period, some results have not been displayed. A complete set of results can be found in Results Review.     Recent Labs   Lab 01/06/25  0633  01/05/25  0626 01/05/25  0150 01/04/25  1227   WBC 9.0 11.3*  --  15.8*   HGB 14.0 15.0  --  16.2   MCV 94 93  --  92   PLT 59* 60*  --  50*    145  --  142   POTASSIUM 3.7 4.1  --  3.9   CHLORIDE 107 106  --  101   CO2 26 28  --  26   BUN 13.5 16.6  --  26.7*   CR 0.63* 0.72  --  0.87   ANIONGAP 9 11  --  15   KARLIE 8.0* 8.1*  --  8.4*   GLC 78 103* 145* 96   ALBUMIN  --   --   --  3.8   PROTTOTAL  --   --   --  6.1*   BILITOTAL  --   --   --  0.5   ALKPHOS  --   --   --  89   ALT  --   --   --  6   AST  --   --   --  18       No results found for this or any previous visit (from the past 24 hours).    Medical Decision Making       110 MINUTES SPENT BY ME on the date of service doing chart review, history, exam, documentation & further activities per the note.

## 2025-01-06 NOTE — PLAN OF CARE
Goal Outcome Evaluation:      Plan of Care Reviewed With: patient    Overall Patient Progress: no changeOverall Patient Progress: no change    Outcome Evaluation: Patient is alert and oriented x4, still with generalized weakness and SOB on exertion. On 1L NC, lung sounds coarse bilaterally. Telemetry in place    VS: VSS   O2: O2 WDL on RA. Denies SOB/CP   Output: Continent of b/b   Last BM: 1/5 per patient    Activity: AO1, SBA with walker   Skin: Scattered bruising and scabbing    Pain: Reported chronic low back pain, declined pain meds   CMS: A&Ox4. Denies N/T.    Dressing: Dressing to L hand scab   Diet: regular   LDA: R PIV SL   Equipment: IV pole, personal belongings   Plan: IV abx, monitor for signs of sepsis

## 2025-01-07 LAB
ANION GAP SERPL CALCULATED.3IONS-SCNC: 9 MMOL/L (ref 7–15)
BASOPHILS # BLD MANUAL: 0 10E3/UL (ref 0–0.2)
BASOPHILS NFR BLD MANUAL: 0 %
BUN SERPL-MCNC: 13 MG/DL (ref 8–23)
BURR CELLS BLD QL SMEAR: SLIGHT
CALCIUM SERPL-MCNC: 8.2 MG/DL (ref 8.8–10.4)
CHLORIDE SERPL-SCNC: 102 MMOL/L (ref 98–107)
CREAT SERPL-MCNC: 0.59 MG/DL (ref 0.67–1.17)
EGFRCR SERPLBLD CKD-EPI 2021: >90 ML/MIN/1.73M2
EOSINOPHIL # BLD MANUAL: 0.1 10E3/UL (ref 0–0.7)
EOSINOPHIL NFR BLD MANUAL: 1 %
ERYTHROCYTE [DISTWIDTH] IN BLOOD BY AUTOMATED COUNT: 13.7 % (ref 10–15)
GLUCOSE SERPL-MCNC: 92 MG/DL (ref 70–99)
HCO3 SERPL-SCNC: 29 MMOL/L (ref 22–29)
HCT VFR BLD AUTO: 44 % (ref 40–53)
HGB BLD-MCNC: 14.5 G/DL (ref 13.3–17.7)
LYMPHOCYTES # BLD MANUAL: 0.6 10E3/UL (ref 0.8–5.3)
LYMPHOCYTES NFR BLD MANUAL: 7 %
MCH RBC QN AUTO: 30.7 PG (ref 26.5–33)
MCHC RBC AUTO-ENTMCNC: 33 G/DL (ref 31.5–36.5)
MCV RBC AUTO: 93 FL (ref 78–100)
METAMYELOCYTES # BLD MANUAL: 0.2 10E3/UL
METAMYELOCYTES NFR BLD MANUAL: 3 %
MONOCYTES # BLD MANUAL: 0.8 10E3/UL (ref 0–1.3)
MONOCYTES NFR BLD MANUAL: 9 %
MYELOCYTES # BLD MANUAL: 0.1 10E3/UL
MYELOCYTES NFR BLD MANUAL: 2 %
NEUTROPHILS # BLD MANUAL: 7 10E3/UL (ref 1.6–8.3)
NEUTROPHILS NFR BLD MANUAL: 79 %
PLAT MORPH BLD: ABNORMAL
PLATELET # BLD AUTO: 62 10E3/UL (ref 150–450)
POTASSIUM SERPL-SCNC: 3.7 MMOL/L (ref 3.4–5.3)
RBC # BLD AUTO: 4.72 10E6/UL (ref 4.4–5.9)
RBC MORPH BLD: ABNORMAL
SODIUM SERPL-SCNC: 140 MMOL/L (ref 135–145)
WBC # BLD AUTO: 8.8 10E3/UL (ref 4–11)

## 2025-01-07 PROCEDURE — 250N000012 HC RX MED GY IP 250 OP 636 PS 637

## 2025-01-07 PROCEDURE — 99233 SBSQ HOSP IP/OBS HIGH 50: CPT | Performed by: INTERNAL MEDICINE

## 2025-01-07 PROCEDURE — 250N000009 HC RX 250

## 2025-01-07 PROCEDURE — 258N000003 HC RX IP 258 OP 636: Performed by: STUDENT IN AN ORGANIZED HEALTH CARE EDUCATION/TRAINING PROGRAM

## 2025-01-07 PROCEDURE — 250N000013 HC RX MED GY IP 250 OP 250 PS 637

## 2025-01-07 PROCEDURE — 250N000011 HC RX IP 250 OP 636: Performed by: INTERNAL MEDICINE

## 2025-01-07 PROCEDURE — 85014 HEMATOCRIT: CPT

## 2025-01-07 PROCEDURE — 85007 BL SMEAR W/DIFF WBC COUNT: CPT

## 2025-01-07 PROCEDURE — 94640 AIRWAY INHALATION TREATMENT: CPT | Mod: 76

## 2025-01-07 PROCEDURE — 94640 AIRWAY INHALATION TREATMENT: CPT

## 2025-01-07 PROCEDURE — 250N000013 HC RX MED GY IP 250 OP 250 PS 637: Performed by: INTERNAL MEDICINE

## 2025-01-07 PROCEDURE — 36415 COLL VENOUS BLD VENIPUNCTURE: CPT

## 2025-01-07 PROCEDURE — 250N000013 HC RX MED GY IP 250 OP 250 PS 637: Performed by: CLINICAL NURSE SPECIALIST

## 2025-01-07 PROCEDURE — 250N000013 HC RX MED GY IP 250 OP 250 PS 637: Performed by: STUDENT IN AN ORGANIZED HEALTH CARE EDUCATION/TRAINING PROGRAM

## 2025-01-07 PROCEDURE — 99233 SBSQ HOSP IP/OBS HIGH 50: CPT | Performed by: CLINICAL NURSE SPECIALIST

## 2025-01-07 PROCEDURE — 99418 PROLNG IP/OBS E/M EA 15 MIN: CPT | Performed by: CLINICAL NURSE SPECIALIST

## 2025-01-07 PROCEDURE — 250N000011 HC RX IP 250 OP 636: Performed by: STUDENT IN AN ORGANIZED HEALTH CARE EDUCATION/TRAINING PROGRAM

## 2025-01-07 PROCEDURE — 120N000002 HC R&B MED SURG/OB UMMC

## 2025-01-07 PROCEDURE — 82565 ASSAY OF CREATININE: CPT

## 2025-01-07 PROCEDURE — 80048 BASIC METABOLIC PNL TOTAL CA: CPT

## 2025-01-07 RX ORDER — MORPHINE SULFATE 20 MG/ML
10 SOLUTION ORAL 3 TIMES DAILY
Status: DISCONTINUED | OUTPATIENT
Start: 2025-01-07 | End: 2025-01-14

## 2025-01-07 RX ORDER — MORPHINE SULFATE 20 MG/ML
10 SOLUTION ORAL
Status: DISCONTINUED | OUTPATIENT
Start: 2025-01-07 | End: 2025-01-14

## 2025-01-07 RX ORDER — LORAZEPAM 0.5 MG/1
0.25 TABLET ORAL EVERY 6 HOURS PRN
Status: DISCONTINUED | OUTPATIENT
Start: 2025-01-07 | End: 2025-01-15

## 2025-01-07 RX ADMIN — AMLODIPINE BESYLATE 5 MG: 5 TABLET ORAL at 21:59

## 2025-01-07 RX ADMIN — IPRATROPIUM BROMIDE AND ALBUTEROL SULFATE 3 ML: .5; 3 SOLUTION RESPIRATORY (INHALATION) at 08:26

## 2025-01-07 RX ADMIN — Medication 1 HALF-TAB: at 08:50

## 2025-01-07 RX ADMIN — CARBIDOPA AND LEVODOPA 1 TABLET: 25; 100 TABLET ORAL at 08:50

## 2025-01-07 RX ADMIN — LEVOTHYROXINE SODIUM 112 MCG: 112 TABLET ORAL at 08:50

## 2025-01-07 RX ADMIN — IPRATROPIUM BROMIDE AND ALBUTEROL SULFATE 3 ML: .5; 3 SOLUTION RESPIRATORY (INHALATION) at 12:46

## 2025-01-07 RX ADMIN — CEFTRIAXONE SODIUM 2 G: 2 INJECTION, POWDER, FOR SOLUTION INTRAMUSCULAR; INTRAVENOUS at 15:02

## 2025-01-07 RX ADMIN — GABAPENTIN 400 MG: 300 CAPSULE ORAL at 15:02

## 2025-01-07 RX ADMIN — CARBIDOPA AND LEVODOPA 1 TABLET: 25; 100 TABLET ORAL at 20:11

## 2025-01-07 RX ADMIN — Medication 1 HALF-TAB: at 15:02

## 2025-01-07 RX ADMIN — IPRATROPIUM BROMIDE AND ALBUTEROL SULFATE 3 ML: .5; 3 SOLUTION RESPIRATORY (INHALATION) at 16:19

## 2025-01-07 RX ADMIN — METOPROLOL SUCCINATE 50 MG: 50 TABLET, EXTENDED RELEASE ORAL at 08:50

## 2025-01-07 RX ADMIN — IPRATROPIUM BROMIDE AND ALBUTEROL SULFATE 3 ML: .5; 3 SOLUTION RESPIRATORY (INHALATION) at 19:42

## 2025-01-07 RX ADMIN — CARBIDOPA AND LEVODOPA 1 TABLET: 25; 100 TABLET ORAL at 15:02

## 2025-01-07 RX ADMIN — AZITHROMYCIN MONOHYDRATE 500 MG: 500 INJECTION, POWDER, LYOPHILIZED, FOR SOLUTION INTRAVENOUS at 17:31

## 2025-01-07 RX ADMIN — GABAPENTIN 400 MG: 300 CAPSULE ORAL at 08:49

## 2025-01-07 RX ADMIN — PREDNISONE 40 MG: 20 TABLET ORAL at 08:48

## 2025-01-07 RX ADMIN — Medication 1 HALF-TAB: at 20:11

## 2025-01-07 RX ADMIN — MORPHINE SULFATE 10 MG: 20 SOLUTION ORAL at 15:09

## 2025-01-07 RX ADMIN — GABAPENTIN 400 MG: 300 CAPSULE ORAL at 20:11

## 2025-01-07 RX ADMIN — MORPHINE SULFATE 10 MG: 20 SOLUTION ORAL at 20:11

## 2025-01-07 ASSESSMENT — ACTIVITIES OF DAILY LIVING (ADL)
ADLS_ACUITY_SCORE: 40

## 2025-01-07 NOTE — PROGRESS NOTES
"[facility]    Medicine Progress Note - Hospitalist Service, GOLD TEAM 16    Date of Admission:  1/4/2025    Assessment & Plan   A: Patient is a 78 y/o man who has a past medical history significant for thyroid cancer with lung metastases, Parkinson's disease, neuropathy, glaucoma, osteoporosis, chronic pain, severe COPD, hyperlipidemia, obstructive sleep apnea and coronary artery disease. Patient presented on 04-Jan-2025 with a 7 day history of productive cough and a 1 day history of weakness. CT pulmonary angiogram was negative for pulmonary embolism. Patient appears to have COPD exacerbation but there is concern for community-acquired pneumonia as well. There is also concern that patient was in early sepsis on presentation.      Patient reportedly saw Pulmonology in Oct-2024 and COPD was moderately well-controlled at that time. Activity was apparently limited much more by his balance and parkinsonism than by respiratory status. Patient has antibiotics and prednisone available for outpatient COPD flares but apparently has not recently used this.      Patient apparently just entered hospice in the past week prior to hospital admission. Patient stated on admission that he was too weak and he and his daughter called their hospice agency and 911 was called and he was brought into the hospital. When provider asked on day of presentation whether patient wished for hospitalization and treatment of his potential infection, patient stated that he was seeking treatment and reported that his goal was to \"be able to do things for myself\". It was discussed that hospice typically focused on comfort rather than diagnostic testing and aggressive medical treatment. Patient acknowledged this but seemed unsure of what his true desires for his care are moving forward.      P:    1/7/24  Spoke with palliative , appreciate help . Patient has been started on morphine. Palliative spoke with " daughter Gabriela and SW   Discuss in care rounds   Resume amlodipine   Resume lasix  Hold asa  (low platelets )  Resume crestor           # Possible community-acquired pneumonia:  - Patient empirically on ceftriaxone and azithromycin. Patient on day 3 of antibiotics. Planning a 5 day antibiotic course.   - Blood cultures NTD     # COPD exacerbation:  - Patient on a day 4 of a 5 day course of prednisone 40 mg daily.  - If symptoms persist, prednisone course might need to be extended.   - Patient on scheduled duonebs.  - Albuterol inhaler as needed.  - Trelefy ellipta to be resumed per patient request.      #Chronic hypoxemic respiratory failure:  - Patient usually on 1-2 litres per minute of supplemental oxygen at baseline.     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  -     # Right upper lobe lung mass s/p radiation therapy with possible radiation pneumonitis:  - Patient had previous VATS biopsy in Feb-2022 that was non-diagnostic.     #Post-surgical hypothyroidism:  - Patient on levothyroxine 112 mcg daily.     # Rheumatoid arthritis with positive rheumatoid factor:  - Per chart review, patient had once been on methotrexate and prednisone but had stopped these medications a few years ago.  - Monitoring for symptoms.     #Coronary artery disease with past MI s/p stent placement:  - Patient usually on aspirin and crestor but these are currently on hold.   - Patient usually on metoprolol succinate 50 mg daily and this is being continued.     #Hypertension:  - Patient usually on norvasc 5 mg nightly but this is on hold.  - Patient usually on metoprolol succinate 50 mg daily and this is being continued.       # Hyperlipidemia:  - Patient usually on crestor      #Parkinson's disease:  - Patient on sinemet three times a day.     # Neuropathy, chronic pain syndrome:  - Patient currently on gabapentin 400 mg three times a day.  - Patient usually on  methocarbamol but this is currently on hold.     # Glaucoma:  - Patient not currently on medication for this.  - Patient to f/u with Ophthalmology as outpatient if further treatment is desired.     # Osteoporosis:  - Patient on prolia as outpatient.    # History of subarachnoid hemorrhage in 2023:  - No active intervention indicated.     # Acute thrombocytopenia:  CTM              Diet: Regular Diet Adult    DVT Prophylaxis: Pneumatic Compression Devices  Mir Catheter: Not present  Lines: None     Cardiac Monitoring: None  Code Status: No CPR- Do NOT Intubate      Clinically Significant Risk Factors                 # Thrombocytopenia: Lowest platelets = 59 in last 2 days, will monitor for bleeding   # Hypertension: Noted on problem list                # Financial/Environmental Concerns:           Social Drivers of Health    Tobacco Use: Medium Risk (10/18/2024)    Patient History     Smoking Tobacco Use: Former     Smokeless Tobacco Use: Never   Physical Activity: Inactive (10/14/2024)    Exercise Vital Sign     Days of Exercise per Week: 0 days     Minutes of Exercise per Session: 0 min   Stress: Stress Concern Present (10/14/2024)    [employer] - Occupational Stress Questionnaire     Feeling of Stress : Very much   Social Connections: Unknown (10/14/2024)    Social Connection and Isolation Panel [NHANES]     Frequency of Social Gatherings with Friends and Family: More than three times a week          Disposition Plan     Medically Ready for Discharge: Anticipated in 2-4 Days             Samantha Niño MD  Hospitalist Service, [address]  Securely message with [employer] (more info)  Text page via [facility] Paging/Directory   See signed in provider for up to date coverage information  ______________________________________________________________________    Interval History   No new symptoms reported per nursing staff .  Thinks  breathing is hte same  Wants to continue abx  No Fever   No vomiting   No difficulty with voiding   Passing gas .  Tolerating diet     4 system ROS reviewed .     Physical Exam   Vital Signs: Temp: 98.1  F (36.7  C) Temp src: Oral BP: (!) 144/99 Pulse: 68   Resp: 16 SpO2: 98 % O2 Device: Nasal cannula Oxygen Delivery: 2 LPM  Weight: 157 lbs 14.4 oz         Alert and oriented . Cough is wet sounding   Chest ; Breath sounds are equal BL. rhonchi  Cardiovascular Exam ; S1 & S2 .  GI ; Abdomen is soft and non tender. BS positive .  Skin ; no jaundice noted.  Psych ; Silvia mood & affect.    Medical Decision Making       51 MINUTES SPENT BY ME on the date of service doing chart review, history, exam, documentation & further activities per the note.      Data     I have personally reviewed the following data over the past 24 hrs:    8.8  \   14.5   / 62 (L)     140 102 13.0 /  92   3.7 29 0.59 (L) \

## 2025-01-07 NOTE — PROGRESS NOTES
PALLIATIVE CARE PROGRESS NOTE  [facility]     Patient Name: Harrison Thomas  Date of Admission: 1/4/2025   Today the patient was seen for: goals of care and symptoms     Recommendations & Counseling       GOALS OF CARE:   DNR/DNI, wants to continue course of antibiotics in hopes he can get better, he has been told they are unlikely to be meaningful in getting him better at this point  Continues to be agreeable to hospice team involvement when he discharges, specifically wants to keep his same hospice team  I am worried about his needs being higher then what he can be supported with at his daughters home currently. Appreciate unit SW support with navigating disposition.   Additionally he has an appointment Friday that I am not entirely sure he can actually get to given his medical condition currently. Appreciate the primary team and unit SW navigating any needs with this.    ADVANCE CARE PLANNING:  Patient has an advance directive dated 3/13/2023.  Primary Health Care Agent Adeolanidia Thomas- spouse but currently divorce.  Alternate(s) daughter- Janey.  He would not want to involve his wife anymore and would want Janey to support him with decision making.  There is a POLST form on file, but will need to be updated prior to discharge.  Code status: No CPR- Do NOT Intubate    MEDICAL MANAGEMENT:   #Pain,chronic: Usually takes 10 mg oxycodone 4 times a day. Took morphine yesterday, not sure it was helpful. Encouraged to continue to take. Didn't take dose this morning. He is very dyspneic right now and doesn't feel good.  Increased morphine to 10 mg TID scheduled, can treat baseline pain and hope would be to also treat some of his baseline dyspnea     #Dyspnea,COPD: dyspneic in bed and with talking. Seems worse today then when I saw him yesterday.   Fan at bedside  Changed orders for morphine to only give 10 mg dose q2h PRN  Encourage use of morphine.  If morphine not  helpful I have also added lorazepam 0.25 mg q6h PRN and encouraged him to try this if the morphine is not helping     #At risk for OIC: last BM was yesterday.  Consider adding 1/2 dose of miralax if no BM in 2 days        PSYCHOSOCIAL/SPIRITUAL SUPPORT:  Family daughter Janey.    Palliative Care will continue to follow. Thank you for the consult and allowing us to aid in the care of Harrison Thomas.    These recommendations have been discussed with primary team. Unit SW.    PREETI Moreira CNS  [facility], Palliative Care  Securely message with the Vocera Web Console (learn more here) or  Text page via [facility] Paging/Directory        Assessment          Harrison Thomas is a 79 year old male with a past medical history of thyroid cancer with lung metastases, parkinson's disease, neuropathy, glaucoma, osteoporosis, chronic pain, severe COPD, HLD, BRENNEN, CAD who presented on 1/4 with a 7 day history of productive cough and 1 day history of weakness. Upon admission CT pulmonary angio done and was negative for PE, treated for COPD exacerbation and PNA. Prior to admission he enrolled in hospice however presented to the hospital after being unable to reach hospice and then needed to call 911.      Today, the patient was seen for:  Severe COPD  History of thyroid cancer with lung mets  Parkinson's disease      Interval History:     Multidisciplinary collaboration:  Notes reviewed, had a court appointment last evening, also has one on Friday and needs to be in person.     Notable medications:  Sinemet TID   Gabapentin 400 mg TID  Morphine 5 mg TID      Patient/family narrative  Saw today, dyspneic in bed, wearing oxygen. Not feeling the greatest. But breathing seems to be about the same he thinks. Reviewed with him goals of care to discharge with hospice and discussed the concerns about getting his needs met with his current plan. Discussed alternative options such as his sisters, if she were willing to care for  him, and LTC/detention. Patient did not communicate a choice in this. Discussed also exploring if they can hire help to keep him at his daughters house, unclear if that will be a good plan. Discussed with him that he very much still wants hospice services when he leaves and wants to keep with his same team he had as he really enjoyed them and the nurse. Discussed his symptoms which are not well controlled currently and encouraged him to take his morphine and lorazepam. Discussed my concern is that he is starting to progress in his illness and possibly enter into much shorter time if he continues to get worse. Recommended transition to full comfort measures only and he was hesitant and wanted to continue his antibiotics to see if they will help him get better. We discussed that was unlikely given he has not felt better yet on them and the last day is tomorrow and discussed re-discussing transition to comfort measures only tomorrow.    Review of Systems:     Besides above, ROS was reviewed and is unremarkable        Physical Exam:   Temp:  [97.6  F (36.4  C)-98.1  F (36.7  C)] 98.1  F (36.7  C)  Pulse:  [68-69] 68  Resp:  [16] 16  BP: (144-164)/(82-99) 144/99  SpO2:  [98 %-99 %] 98 %  157 lbs 14.4 oz    PHYSICAL EXAM:  Constitutional: Awake, alert, cooperative, chronically ill appearing, no apparent distress  Lungs: some increased work of breathing, good air exchange  Neurologic: Awake, alert, oriented to name, place and time.  Neuropsychiatric: Normal affect, mood, orientation, memory and insight.  Skin: No rashes, erythema            Data Reviewed:     Results for orders placed or performed during the hospital encounter of 01/04/25 (from the past 24 hours)   CBC with Platelets & Differential    Narrative    The following orders were created for panel order CBC with Platelets & Differential.  Procedure                               Abnormality         Status                     ---------                                -----------         ------                     CBC with platelets and d...[[id number]]  Abnormal            Final result               Manual Differential[[id number]]          Abnormal            Final result                 Please view results for these tests on the individual orders.   Basic metabolic panel   Result Value Ref Range    Sodium 140 135 - 145 mmol/L    Potassium 3.7 3.4 - 5.3 mmol/L    Chloride 102 98 - 107 mmol/L    Carbon Dioxide (CO2) 29 22 - 29 mmol/L    Anion Gap 9 7 - 15 mmol/L    Urea Nitrogen 13.0 8.0 - 23.0 mg/dL    Creatinine 0.59 (L) 0.67 - 1.17 mg/dL    GFR Estimate >90 >60 mL/min/1.73m2    Calcium 8.2 (L) 8.8 - 10.4 mg/dL    Glucose 92 70 - 99 mg/dL   CBC with platelets and differential   Result Value Ref Range    WBC Count 8.8 4.0 - 11.0 10e3/uL    RBC Count 4.72 4.40 - 5.90 10e6/uL    Hemoglobin 14.5 13.3 - 17.7 g/dL    Hematocrit 44.0 40.0 - 53.0 %    MCV 93 78 - 100 fL    MCH 30.7 26.5 - 33.0 pg    MCHC 33.0 31.5 - 36.5 g/dL    RDW 13.7 10.0 - 15.0 %    Platelet Count 62 (L) 150 - 450 10e3/uL   Manual Differential   Result Value Ref Range    % Neutrophils 79 %    % Lymphocytes 7 %    % Monocytes 9 %    % Eosinophils 1 %    % Basophils 0 %    % Metamyelocytes 3 %    % Myelocytes 2 %    Absolute Neutrophils 7.0 1.6 - 8.3 10e3/uL    Absolute Lymphocytes 0.6 (L) 0.8 - 5.3 10e3/uL    Absolute Monocytes 0.8 0.0 - 1.3 10e3/uL    Absolute Eosinophils 0.1 0.0 - 0.7 10e3/uL    Absolute Basophils 0.0 0.0 - 0.2 10e3/uL    Absolute Metamyelocytes 0.2 (H) <=0.0 10e3/uL    Absolute Myelocytes 0.1 (H) <=0.0 10e3/uL    RBC Morphology Confirmed RBC Indices     Platelet Assessment  Automated Count Confirmed. Platelet morphology is normal.     Automated Count Confirmed. Platelet morphology is normal.    [place] Cells Slight (A) None Seen     *Note: Due to a large number of results and/or encounters for the requested time period, some results have not been displayed. A complete set of results can be found in  Results Review.     Recent Labs   Lab 01/07/25  0524 01/06/25  0621 01/05/25  0626 01/05/25  0150 01/04/25  1227   WBC 8.8 9.0 11.3*  --  15.8*   HGB 14.5 14.0 15.0  --  16.2   MCV 93 94 93  --  92   PLT 62* 59* 60*  --  50*    142 145  --  142   POTASSIUM 3.7 3.7 4.1  --  3.9   CHLORIDE 102 107 106  --  101   CO2 29 26 28  --  26   BUN 13.0 13.5 16.6  --  26.7*   CR 0.59* 0.63* 0.72  --  0.87   ANIONGAP 9 9 11  --  15   KARLIE 8.2* 8.0* 8.1*  --  8.4*   GLC 92 78 103*   < > 96   ALBUMIN  --   --   --   --  3.8   PROTTOTAL  --   --   --   --  6.1*   BILITOTAL  --   --   --   --  0.5   ALKPHOS  --   --   --   --  89   ALT  --   --   --   --  6   AST  --   --   --   --  18    < > = values in this interval not displayed.       No results found for this or any previous visit (from the past 24 hours).      Medical Decision Making       67 MINUTES SPENT BY ME on the date of service doing chart review, history, exam, documentation & further activities per the note.

## 2025-01-07 NOTE — PROGRESS NOTES
"Care Management Follow Up    Length of Stay (days): 3    Expected Discharge Date: 01/11/24     Concerns to be Addressed: discharge planning     Patient plan of care discussed at interdisciplinary rounds: Yes    Anticipated Discharge Disposition: Home, Hospice              Anticipated Discharge Services: Other (see comment) (current on home hospice)  Anticipated Discharge DME: None    Patient/family educated on Medicare website which has current facility and service quality ratings:    Education Provided on the Discharge Plan: Yes  Patient/Family in Agreement with the Plan:      Referrals Placed by CM/SW: Homecare (currently on home hospice)  Private pay costs discussed: Additional home health aids/24/7 support.    Discussed  Partnership in Safe Discharge Planning  document with patient/family: Yes:     Handoff Completed: No, handoff not indicated or clinically appropriate    Additional Information:  SW met with Pt briefly to explore 24/7 support at  home. Pt stated \"I Have no  money.\" SW identified that there were some free/low cost hospice supports and will follow up with Pt tomorrow regarding these. SW called Pt's daughter Janey to explore needs/resources. [facility] hosp[facility] is a free inpatient hospice that may be an appropriate referral when Pt is ready to discharge from the hospital.     Next Steps:   SW/RNCC will continue to follow for safe discharge placement and planning.     Antonio Houston, Northern Light C.A. Dean HospitalSW  [facility]   Ph: [phone]      "

## 2025-01-07 NOTE — PROGRESS NOTES
Chief  Stevie Griggs   1/7/25      Re : Harrison Thomas  1945       Dear  .    Mr Thomas is currently hospitalized with a medical condition at [facility] . He is not going to be able to make an in person appearance in court on 1/10/2025.    Please reconsider postponing the appointment at a later date .    Sincerely .      Samantha Niño MD   Medical Director 5 ortho   [address] [place] [id number]   Phone [phone]

## 2025-01-07 NOTE — PLAN OF CARE
VS: VSS, pt denied CP some shortness of breathing during activity.   O2: On oxygen at 2 L per NC, sat. > 90%.   Output: Voiding adequate amount in the bathroom.    Last BM: 01/07/25   Activity: Up with walker and SBA.    Skin: Intact except Bruises on BUE   Pain: Some discomfort medicated with PRN medication.    Neuro: CMS and neuro intact to baseline.    Dressing: None.    Diet: Regular tolerating okay.    LDA: FER GUZMAN.    Equipment: IV pole, walker and personal belongings.    Plan: TBD.    Additional Info:

## 2025-01-07 NOTE — PLAN OF CARE
Goal Outcome Evaluation:    Plan of Care Reviewed With: patient    Overall Patient Progress: no change    Outcome Evaluation: Pt remains A/O x4. Pt continues to have elevated BPs at baseline, otherwise vitally stable. Pt continues to deny chest pain & N/V. Pt continues to be SOB with activity and when talking for long periods. Pt continues to be assist x1 with walker and gait belt. Pt continues to be on O2 via NC, now on 2 LPM per RT change. Pt continues to have chronic back pain managed per MAR. Pt uses call light appropriately and able to make needs known. No acute changes overnight.

## 2025-01-07 NOTE — PLAN OF CARE
VS: VSS, pt denied CP some shortness of breathing during activity.   O2: On oxygen at 1.5 L per NC, sat. > 90%.   Output: Voiding adequate amount in the bathroom.    Last BM: 01/05/25   Activity: Up with walker and SBA.    Skin: Intact except Bruises on BUE   Pain: Some discomfort medicated with PRN medication.    Neuro: CMS and neuro intact to baseline.    Dressing: None.    Diet: Regular tolerating okay.    LDA: FER GUZMAN.    Equipment: IV pole, walker and personal belongings.    Plan: TBD.    Additional Info:

## 2025-01-08 LAB
ANION GAP SERPL CALCULATED.3IONS-SCNC: 8 MMOL/L (ref 7–15)
BASOPHILS # BLD MANUAL: 0 10E3/UL (ref 0–0.2)
BASOPHILS NFR BLD MANUAL: 0 %
BUN SERPL-MCNC: 17.4 MG/DL (ref 8–23)
CALCIUM SERPL-MCNC: 9 MG/DL (ref 8.8–10.4)
CHLORIDE SERPL-SCNC: 104 MMOL/L (ref 98–107)
CREAT SERPL-MCNC: 0.69 MG/DL (ref 0.67–1.17)
EGFRCR SERPLBLD CKD-EPI 2021: >90 ML/MIN/1.73M2
EOSINOPHIL # BLD MANUAL: 0 10E3/UL (ref 0–0.7)
EOSINOPHIL NFR BLD MANUAL: 0 %
ERYTHROCYTE [DISTWIDTH] IN BLOOD BY AUTOMATED COUNT: 13.6 % (ref 10–15)
GLUCOSE SERPL-MCNC: 74 MG/DL (ref 70–99)
HCO3 SERPL-SCNC: 30 MMOL/L (ref 22–29)
HCT VFR BLD AUTO: 45.5 % (ref 40–53)
HGB BLD-MCNC: 15.1 G/DL (ref 13.3–17.7)
LYMPHOCYTES # BLD MANUAL: 1 10E3/UL (ref 0.8–5.3)
LYMPHOCYTES NFR BLD MANUAL: 9 %
MCH RBC QN AUTO: 31.6 PG (ref 26.5–33)
MCHC RBC AUTO-ENTMCNC: 33.2 G/DL (ref 31.5–36.5)
MCV RBC AUTO: 95 FL (ref 78–100)
METAMYELOCYTES # BLD MANUAL: 0.3 10E3/UL
METAMYELOCYTES NFR BLD MANUAL: 3 %
MONOCYTES # BLD MANUAL: 0.2 10E3/UL (ref 0–1.3)
MONOCYTES NFR BLD MANUAL: 2 %
MYELOCYTES # BLD MANUAL: 0.2 10E3/UL
MYELOCYTES NFR BLD MANUAL: 2 %
NEUTROPHILS # BLD MANUAL: 10.3 10E3/UL (ref 1.6–8.3)
NEUTROPHILS NFR BLD MANUAL: 86 %
NRBC # BLD AUTO: 0.1 10E3/UL
NRBC BLD MANUAL-RTO: 1 %
PLAT MORPH BLD: ABNORMAL
PLATELET # BLD AUTO: 52 10E3/UL (ref 150–450)
POTASSIUM SERPL-SCNC: 4.4 MMOL/L (ref 3.4–5.3)
RBC # BLD AUTO: 4.78 10E6/UL (ref 4.4–5.9)
RBC MORPH BLD: ABNORMAL
SODIUM SERPL-SCNC: 142 MMOL/L (ref 135–145)
WBC # BLD AUTO: 12.1 10E3/UL (ref 4–11)

## 2025-01-08 PROCEDURE — 120N000002 HC R&B MED SURG/OB UMMC

## 2025-01-08 PROCEDURE — 99418 PROLNG IP/OBS E/M EA 15 MIN: CPT | Performed by: CLINICAL NURSE SPECIALIST

## 2025-01-08 PROCEDURE — 94640 AIRWAY INHALATION TREATMENT: CPT

## 2025-01-08 PROCEDURE — 94640 AIRWAY INHALATION TREATMENT: CPT | Mod: 76

## 2025-01-08 PROCEDURE — 250N000013 HC RX MED GY IP 250 OP 250 PS 637: Performed by: INTERNAL MEDICINE

## 2025-01-08 PROCEDURE — 80048 BASIC METABOLIC PNL TOTAL CA: CPT

## 2025-01-08 PROCEDURE — 85007 BL SMEAR W/DIFF WBC COUNT: CPT

## 2025-01-08 PROCEDURE — 84132 ASSAY OF SERUM POTASSIUM: CPT

## 2025-01-08 PROCEDURE — 250N000009 HC RX 250

## 2025-01-08 PROCEDURE — 99233 SBSQ HOSP IP/OBS HIGH 50: CPT | Performed by: CLINICAL NURSE SPECIALIST

## 2025-01-08 PROCEDURE — 250N000013 HC RX MED GY IP 250 OP 250 PS 637: Performed by: STUDENT IN AN ORGANIZED HEALTH CARE EDUCATION/TRAINING PROGRAM

## 2025-01-08 PROCEDURE — 999N000157 HC STATISTIC RCP TIME EA 10 MIN

## 2025-01-08 PROCEDURE — 82565 ASSAY OF CREATININE: CPT

## 2025-01-08 PROCEDURE — 250N000009 HC RX 250: Performed by: STUDENT IN AN ORGANIZED HEALTH CARE EDUCATION/TRAINING PROGRAM

## 2025-01-08 PROCEDURE — 250N000013 HC RX MED GY IP 250 OP 250 PS 637: Performed by: CLINICAL NURSE SPECIALIST

## 2025-01-08 PROCEDURE — 250N000011 HC RX IP 250 OP 636: Performed by: INTERNAL MEDICINE

## 2025-01-08 PROCEDURE — 99233 SBSQ HOSP IP/OBS HIGH 50: CPT | Performed by: INTERNAL MEDICINE

## 2025-01-08 PROCEDURE — 85027 COMPLETE CBC AUTOMATED: CPT

## 2025-01-08 PROCEDURE — 250N000012 HC RX MED GY IP 250 OP 636 PS 637

## 2025-01-08 PROCEDURE — 250N000013 HC RX MED GY IP 250 OP 250 PS 637

## 2025-01-08 PROCEDURE — 36415 COLL VENOUS BLD VENIPUNCTURE: CPT

## 2025-01-08 RX ORDER — FUROSEMIDE 20 MG/1
20 TABLET ORAL EVERY OTHER DAY
Status: DISCONTINUED | OUTPATIENT
Start: 2025-01-08 | End: 2025-01-09

## 2025-01-08 RX ORDER — IPRATROPIUM BROMIDE AND ALBUTEROL SULFATE 2.5; .5 MG/3ML; MG/3ML
3 SOLUTION RESPIRATORY (INHALATION) ONCE
Status: COMPLETED | OUTPATIENT
Start: 2025-01-08 | End: 2025-01-08

## 2025-01-08 RX ORDER — IPRATROPIUM BROMIDE AND ALBUTEROL SULFATE 2.5; .5 MG/3ML; MG/3ML
3 SOLUTION RESPIRATORY (INHALATION) EVERY 4 HOURS PRN
Status: DISCONTINUED | OUTPATIENT
Start: 2025-01-08 | End: 2025-02-18 | Stop reason: HOSPADM

## 2025-01-08 RX ADMIN — METHOCARBAMOL 500 MG: 500 TABLET ORAL at 08:14

## 2025-01-08 RX ADMIN — IPRATROPIUM BROMIDE AND ALBUTEROL SULFATE 3 ML: .5; 3 SOLUTION RESPIRATORY (INHALATION) at 08:54

## 2025-01-08 RX ADMIN — METHOCARBAMOL 500 MG: 500 TABLET ORAL at 17:47

## 2025-01-08 RX ADMIN — CEFTRIAXONE SODIUM 2 G: 2 INJECTION, POWDER, FOR SOLUTION INTRAMUSCULAR; INTRAVENOUS at 14:03

## 2025-01-08 RX ADMIN — MORPHINE SULFATE 10 MG: 20 SOLUTION ORAL at 14:04

## 2025-01-08 RX ADMIN — IPRATROPIUM BROMIDE AND ALBUTEROL SULFATE 3 ML: .5; 3 SOLUTION RESPIRATORY (INHALATION) at 21:13

## 2025-01-08 RX ADMIN — Medication 1 HALF-TAB: at 14:04

## 2025-01-08 RX ADMIN — CARBIDOPA AND LEVODOPA 1 TABLET: 25; 100 TABLET ORAL at 08:14

## 2025-01-08 RX ADMIN — ROSUVASTATIN 10 MG: 10 TABLET, FILM COATED ORAL at 08:15

## 2025-01-08 RX ADMIN — CARBIDOPA AND LEVODOPA 1 TABLET: 25; 100 TABLET ORAL at 19:49

## 2025-01-08 RX ADMIN — METOPROLOL SUCCINATE 50 MG: 50 TABLET, EXTENDED RELEASE ORAL at 08:15

## 2025-01-08 RX ADMIN — LEVOTHYROXINE SODIUM 112 MCG: 112 TABLET ORAL at 08:14

## 2025-01-08 RX ADMIN — FUROSEMIDE 20 MG: 20 TABLET ORAL at 08:15

## 2025-01-08 RX ADMIN — Medication 1 HALF-TAB: at 19:49

## 2025-01-08 RX ADMIN — PREDNISONE 40 MG: 20 TABLET ORAL at 08:14

## 2025-01-08 RX ADMIN — METHOCARBAMOL 500 MG: 500 TABLET ORAL at 14:04

## 2025-01-08 RX ADMIN — GABAPENTIN 400 MG: 300 CAPSULE ORAL at 08:14

## 2025-01-08 RX ADMIN — GABAPENTIN 400 MG: 300 CAPSULE ORAL at 19:49

## 2025-01-08 RX ADMIN — IPRATROPIUM BROMIDE AND ALBUTEROL SULFATE 3 ML: .5; 3 SOLUTION RESPIRATORY (INHALATION) at 00:42

## 2025-01-08 RX ADMIN — CARBIDOPA AND LEVODOPA 1 TABLET: 25; 100 TABLET ORAL at 14:04

## 2025-01-08 RX ADMIN — METHOCARBAMOL 500 MG: 500 TABLET ORAL at 19:49

## 2025-01-08 RX ADMIN — MORPHINE SULFATE 10 MG: 20 SOLUTION ORAL at 19:48

## 2025-01-08 RX ADMIN — GABAPENTIN 400 MG: 300 CAPSULE ORAL at 14:04

## 2025-01-08 RX ADMIN — IPRATROPIUM BROMIDE AND ALBUTEROL SULFATE 3 ML: .5; 3 SOLUTION RESPIRATORY (INHALATION) at 16:27

## 2025-01-08 RX ADMIN — MORPHINE SULFATE 10 MG: 20 SOLUTION ORAL at 08:14

## 2025-01-08 RX ADMIN — Medication 1 HALF-TAB: at 08:14

## 2025-01-08 RX ADMIN — AMLODIPINE BESYLATE 5 MG: 5 TABLET ORAL at 22:19

## 2025-01-08 RX ADMIN — IPRATROPIUM BROMIDE AND ALBUTEROL SULFATE 3 ML: .5; 3 SOLUTION RESPIRATORY (INHALATION) at 12:06

## 2025-01-08 ASSESSMENT — ACTIVITIES OF DAILY LIVING (ADL)
ADLS_ACUITY_SCORE: 40

## 2025-01-08 NOTE — PROGRESS NOTES
"[facility]    Medicine Progress Note - Hospitalist Service, GOLD TEAM 16    Date of Admission:  1/4/2025    Assessment & Plan   A: Patient is a 80 y/o man who has a past medical history significant for thyroid cancer with lung metastases, Parkinson's disease, neuropathy, glaucoma, osteoporosis, chronic pain, severe COPD, hyperlipidemia, obstructive sleep apnea and coronary artery disease. Patient presented on 04-Jan-2025 with a 7 day history of productive cough and a 1 day history of weakness. CT pulmonary angiogram was negative for pulmonary embolism. Patient appears to have COPD exacerbation but there is concern for community-acquired pneumonia as well. There is also concern that patient was in early sepsis on presentation.      Patient reportedly saw Pulmonology in Oct-2024 and COPD was moderately well-controlled at that time. Activity was apparently limited much more by his balance and parkinsonism than by respiratory status. Patient has antibiotics and prednisone available for outpatient COPD flares but apparently has not recently used this.      Patient apparently just entered hospice in the past week prior to hospital admission. Patient stated on admission that he was too weak and he and his daughter called their hospice agency and 911 was called and he was brought into the hospital. When provider asked on day of presentation whether patient wished for hospitalization and treatment of his potential infection, patient stated that he was seeking treatment and reported that his goal was to \"be able to do things for myself\". It was discussed that hospice typically focused on comfort rather than diagnostic testing and aggressive medical treatment. Patient acknowledged this but seemed unsure of what his true desires for his care are moving forward.      P:    1/8  Spoke with palliative , appreciate help . Patient has been started on morphine. Palliative spoke with " daughter Gabriela and JARAD on 1/7/24  Called [phone] and left a message to call back today   Declined bed alarm   Wrote letter for court 1/7/24  Discussed in care rounds   Resumed lasix at 20 mg   Holding asa  (low platelets )  Resumed crestor   Discuss with SW today . Perhaps FCC needed ?          # Possible community-acquired pneumonia:  - Patient empirically on ceftriaxone and azithromycin. Planning a 5 day antibiotic course.   - Blood cultures NTD     # COPD exacerbation:  - Patient on a day 5 of a 5 day course of prednisone 40 mg daily.  - If symptoms persist, prednisone course might need to be extended.   - Patient on scheduled duonebs.  - Albuterol inhaler as needed.  - Trelefy ellipta to be resumed per patient request.      #Chronic hypoxemic respiratory failure:  - Patient usually on 1-2 litres per minute of supplemental oxygen at baseline.  Per daughter he was on bipap at night ?      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  -     # Right upper lobe lung mass s/p radiation therapy with possible radiation pneumonitis:  - Patient had previous VATS biopsy in Feb-2022 that was non-diagnostic.     #Post-surgical hypothyroidism:  - Patient on levothyroxine 112 mcg daily.     # Rheumatoid arthritis with positive rheumatoid factor:  - Per chart review, patient had once been on methotrexate and prednisone but had stopped these medications a few years ago.  - Monitoring for symptoms.     #Coronary artery disease with past MI s/p stent placement:  - Patient usually on aspirin and crestor but these are currently on hold.   - Patient usually on metoprolol succinate 50 mg daily and this is being continued.     #Hypertension:  - Patient usually on norvasc 5 mg nightly but this is on hold.  - Patient usually on metoprolol succinate 50 mg daily and this is being continued.       # Hyperlipidemia:  - Patient usually on crestor      #Parkinson's  disease:  - Patient on sinemet three times a day.     # Neuropathy, chronic pain syndrome:  - Patient currently on gabapentin 400 mg three times a day.  - Patient usually on methocarbamol but this is currently on hold.     # Glaucoma:  - Patient not currently on medication for this.  - Patient to f/u with Ophthalmology as outpatient if further treatment is desired.     # Osteoporosis:  - Patient on prolia as outpatient.    # History of subarachnoid hemorrhage in 2023:  - No active intervention indicated.     # Acute thrombocytopenia:  CTM              Diet: Regular Diet Adult    DVT Prophylaxis: PCD  Mir Catheter: Not present  Lines: None     Cardiac Monitoring: None  Code Status: No CPR- Do NOT Intubate      Clinically Significant Risk Factors                 # Thrombocytopenia: Lowest platelets = 52 in last 2 days, will monitor for bleeding   # Hypertension: Noted on problem list                # Financial/Environmental Concerns:           Social Drivers of Health    Tobacco Use: Medium Risk (10/18/2024)    Patient History     Smoking Tobacco Use: Former     Smokeless Tobacco Use: Never   Physical Activity: Inactive (10/14/2024)    Exercise Vital Sign     Days of Exercise per Week: 0 days     Minutes of Exercise per Session: 0 min   Stress: Stress Concern Present (10/14/2024)    [employer] [place] [employer] - Occupational Stress Questionnaire     Feeling of Stress : Very much   Social Connections: Unknown (10/14/2024)    Social Connection and Isolation Panel [NHANES]     Frequency of Social Gatherings with Friends and Family: More than three times a week          Disposition Plan     Medically Ready for Discharge: Anticipated in 2-4 Days             Samantha Niño MD  Hospitalist Service, GOLD TEAM [address] [place] [address]  Securely message with [employer] (more info)  Text page via [employer] Paging/Directory   See signed in provider for up to date coverage  information  ______________________________________________________________________    Interval History   Feels some what better   No nursing concerns      Physical Exam   Vital Signs: Temp: 98.7  F (37.1  C) Temp src: Oral BP: (!) 150/82 Pulse: 74   Resp: 16 SpO2: 95 % O2 Device: Nasal cannula Oxygen Delivery: 2 LPM  Weight: 157 lbs 14.4 oz         Alert and oriented .  Chest ;coarse BS and reduced at bases  Cardiovascular Exam ; S1 & S2 .  GI ; Abdomen is soft and non tender. BS positive .  Skin ; no jaundice noted.  Psych ; Silvia mood & affect.    Medical Decision Making       51 MINUTES SPENT BY ME on the date of service doing chart review, history, exam, documentation & further activities per the note.      Data     I have personally reviewed the following data over the past 24 hrs:    12.1 (H)  \   15.1   / 52 (L)     142 104 17.4 /  74   4.4 30 (H) 0.69 \

## 2025-01-08 NOTE — PROGRESS NOTES
A/Ox's 4. Pt rated pain as tolerable. Morphine given for pain control.  CMS to baseline. Pt gets shortness of breath with activity. Up with SBA to the bathroom.  Resting in bed at this time with call light in reach. Able to make needs known.

## 2025-01-08 NOTE — PLAN OF CARE
Discuss with daughter Gabriela and JARAD Terry and palliative care  At this time plan is to provide supportive care, complete abx and prednisone and look into hospice options on discharge.  Per request PRN duo nebs added and wean 02 to 1 L to keep sp02 88-92

## 2025-01-08 NOTE — PROGRESS NOTES
PALLIATIVE CARE PROGRESS NOTE  [facility]     Patient Name: Harrison Thomas  Date of Admission: 1/4/2025   Today the patient was seen for: symptom management     Recommendations & Counseling       GOALS OF CARE:   DNR/DNI, wants to continue antibiotics right now, also wants to continue restorative cares in the hospital  Continued work toward discharge with hospice, needing to make a plan that is safe for him outside of the hospital, more support at daughters or sisters house vs. [facility] vs. LTC.    ADVANCE CARE PLANNING:  Patient has an advance directive dated 3/13/2023.  Primary Health Care Agent Adeola Vidalgiorgiosarah- spouse but currently divorce.  Alternate(s) daughter- Janey.  He would not want to involve his wife anymore and would want Janey to support him with decision making.  There is a POLST form on file, but will need to be updated prior to discharge.  Code status: No CPR- Do NOT Intubate    MEDICAL MANAGEMENT:   #Pain,chronic: Usually takes 10 mg oxycodone 4 times a day at home.  #Dyspnea,COPD: mildly dyspneic in bed and with talking. Took increased dose of morphine today and not sure it helped. Today expressed hesitance to increase dose due to concern for side effects, discussed result of not taking means worse possibly worse symptoms. Observationally looks less dyspneic today during this visit then yesterday. He prefers to maximize the nebulizer as much as possible.  Wants to maximize duonebs, can take up to 6 doses a day. Would add two doses PRN that he can request as needed versus scheduling 6 doses, would see what his preference is.  Can also add a saline nebulizers PRN for dyspnea  Fan at bedside would be helpful, doesn't like air blowing on his face. OK to not use if he doesn't like it. Oxygen is also likely helping some with dyspnea treatment.  Continue morphine 10 mg TID scheduled, can treat baseline pain and hope would be to also treat some of his  baseline dyspnea  If not taking morphine scheduled doses, would consider buprenorphine patch for dyspnea, would need to check with pharmacy/hospice agency if this would be covered.  Continue morphine to only give 10 mg dose q2h PRN  Encourage use of morphine.  If morphine not helpful I have also added lorazepam 0.25 mg q6h PRN and encouraged him to try this if the morphine is not helping     #At risk for OIC: last BM was yesterday.  Consider adding 1/2 dose of miralax if no BM in 2 days    PSYCHOSOCIAL/SPIRITUAL:  Family daughter Janey.     Palliative Care will continue to follow. Thank you for the consult and allowing us to aid in the care of Harrison Thomas.    These recommendations have been discussed with primary team.    PREETI Moreira CNS  [facility], Palliative Care  Securely message with the Vocera Web Console (learn more here) or  Text page via [facility] Paging/Directory        Assessment          Harrison Thomas is a 79 year old male with a past medical history of thyroid cancer with lung metastases, parkinson's disease, neuropathy, glaucoma, osteoporosis, chronic pain, severe COPD, HLD, BRENNEN, CAD who presented on 1/4 with a 7 day history of productive cough and 1 day history of weakness. Upon admission CT pulmonary angio done and was negative for PE, treated for COPD exacerbation and PNA. Prior to admission he enrolled in hospice however presented to the hospital after being unable to reach hospice and then needed to call 911.      Today, the patient was seen for:  Severe COPD  History of thyroid cancer with lung mets  Parkinson's disease      Interval History:     Multidisciplinary collaboration:  Notes reviewed, no acute events, did have an acute attack of dyspnea last evening, got an extra nebulizer.     Notable medications:  Sinemet TID  Gabapentin 400 mg TID  Morphine TID  Morphine PRN - x0  Lorazepam PRN - x0    Patient/family narrative  Saws today, doing about the same, reports that his  episode last night was relieved by the nebulizer. He wants to take more nebulizer as they are the most helpful. Doesn't like air blowing on his face. Doesn't want to take more morphine due to worry about side effects.    Met with patient, daughter via phone, hospitalist, and unit SW along with myself. We discussed his current condition that he is not much better then when he came in. Discussed that most likely he is dealing with progression of his severe COPD and other comorbidities such as his parkinson's and complications from cancer treatment. We discussed he remains on the antibiotics today and plan is to stop them tomorrow. Discussed with him and his daughter plan of care after antibiotics and recommended hospice/comfort focused cares. Patient felt at this time he wanted to continue to work toward seeing if he can improve form here in the hospital and wants to continue to discuss options if he gets worse. He is still however in agreement with discharging home with hospice if he is stable enough. Discussed with him that there are concerns that home is not a good option given his progression and need for more support. Discussed needing a 24/7 caregiver. Discussed alternative options to his daughters home such as LTC with hospice versus OLP. Discussed also symptom management and wanting to maximize therapies as much as possible and did not want to escalate morphine for symptoms as this time due to concerns about side effects and also concern for not wanting to take drugs like that.     Review of Systems:     Besides above, ROS was reviewed and is unremarkable        Physical Exam:   Temp:  [98.7  F (37.1  C)] 98.7  F (37.1  C)  Pulse:  [74] 74  Resp:  [16] 16  BP: (150-173)/(77-82) 173/82  SpO2:  [95 %] 95 %  157 lbs 14.4 oz    Constitutional: Awake, alert, cooperative, chronically ill appearing, no apparent distress  Lungs: some increased work of breathing, good air exchange  Neurologic: Awake, alert, oriented to  name, place and time.  Neuropsychiatric: Normal affect, mood, orientation, memory and insight.  Skin: No rashes, erythema          Data Reviewed:     Results for orders placed or performed during the hospital encounter of 01/04/25 (from the past 24 hours)   CBC with Platelets & Differential    Narrative    The following orders were created for panel order CBC with Platelets & Differential.  Procedure                               Abnormality         Status                     ---------                               -----------         ------                     CBC with platelets and d...[[id number]]  Abnormal            Final result               Manual Differential[[id number]]          Abnormal            Final result                 Please view results for these tests on the individual orders.   Basic metabolic panel   Result Value Ref Range    Sodium 142 135 - 145 mmol/L    Potassium 4.4 3.4 - 5.3 mmol/L    Chloride 104 98 - 107 mmol/L    Carbon Dioxide (CO2) 30 (H) 22 - 29 mmol/L    Anion Gap 8 7 - 15 mmol/L    Urea Nitrogen 17.4 8.0 - 23.0 mg/dL    Creatinine 0.69 0.67 - 1.17 mg/dL    GFR Estimate >90 >60 mL/min/1.73m2    Calcium 9.0 8.8 - 10.4 mg/dL    Glucose 74 70 - 99 mg/dL   CBC with platelets and differential   Result Value Ref Range    WBC Count 12.1 (H) 4.0 - 11.0 10e3/uL    RBC Count 4.78 4.40 - 5.90 10e6/uL    Hemoglobin 15.1 13.3 - 17.7 g/dL    Hematocrit 45.5 40.0 - 53.0 %    MCV 95 78 - 100 fL    MCH 31.6 26.5 - 33.0 pg    MCHC 33.2 31.5 - 36.5 g/dL    RDW 13.6 10.0 - 15.0 %    Platelet Count 52 (L) 150 - 450 10e3/uL   Manual Differential   Result Value Ref Range    % Neutrophils 86 %    % Lymphocytes 9 %    % Monocytes 2 %    % Eosinophils 0 %    % Basophils 0 %    % Metamyelocytes 3 %    % Myelocytes 2 %    NRBCs per 100 WBC 1 (H) <=0 %    Absolute Neutrophils 10.3 (H) 1.6 - 8.3 10e3/uL    Absolute Lymphocytes 1.0 0.8 - 5.3 10e3/uL    Absolute Monocytes 0.2 0.0 - 1.3 10e3/uL    Absolute  Eosinophils 0.0 0.0 - 0.7 10e3/uL    Absolute Basophils 0.0 0.0 - 0.2 10e3/uL    Absolute Metamyelocytes 0.3 (H) <=0.0 10e3/uL    Absolute Myelocytes 0.2 (H) <=0.0 10e3/uL    Absolute NRBCs 0.1 (H) <=0.0 10e3/uL    RBC Morphology Confirmed RBC Indices     Platelet Assessment  Automated Count Confirmed. Platelet morphology is normal.     Automated Count Confirmed. Platelet morphology is normal.     *Note: Due to a large number of results and/or encounters for the requested time period, some results have not been displayed. A complete set of results can be found in Results Review.     Recent Labs   Lab 01/08/25  0606 01/07/25  0524 01/06/25  0621 01/05/25  0150 01/04/25  1227   WBC 12.1* 8.8 9.0   < > 15.8*   HGB 15.1 14.5 14.0   < > 16.2   MCV 95 93 94   < > 92   PLT 52* 62* 59*   < > 50*    140 142   < > 142   POTASSIUM 4.4 3.7 3.7   < > 3.9   CHLORIDE 104 102 107   < > 101   CO2 30* 29 26   < > 26   BUN 17.4 13.0 13.5   < > 26.7*   CR 0.69 0.59* 0.63*   < > 0.87   ANIONGAP 8 9 9   < > 15   KARLIE 9.0 8.2* 8.0*   < > 8.4*   GLC 74 92 78   < > 96   ALBUMIN  --   --   --   --  3.8   PROTTOTAL  --   --   --   --  6.1*   BILITOTAL  --   --   --   --  0.5   ALKPHOS  --   --   --   --  89   ALT  --   --   --   --  6   AST  --   --   --   --  18    < > = values in this interval not displayed.       No results found for this or any previous visit (from the past 24 hours).      Medical Decision Making       95 MINUTES SPENT BY ME on the date of service doing chart review, history, exam, documentation & further activities per the note.

## 2025-01-08 NOTE — PLAN OF CARE
Goal Outcome Evaluation:      Plan of Care Reviewed With: patient    Overall Patient Progress: no changeOverall Patient Progress: no change  Pt is alert and oriented x 4, on a 2 litters of NC, gets shortness of breath with any movement, pt's pain managed with morphine, independent in the day, but standby assist overnight, refused having bed alarm on at 5 am this morning. Pt denies chest pain, N/V, was able to make his needs known. Continue pt's monitoring.

## 2025-01-08 NOTE — CONSULTS
Care Management Follow Up    Length of Stay (days): 4    Expected Discharge Date: 01/11/25     Concerns to be Addressed: discharge planning     Patient plan of care discussed at interdisciplinary rounds: Yes    Anticipated Discharge Disposition: Home, Hospice         Anticipated Discharge Services: Other (see comment) (current on home hospice)  Anticipated Discharge DME: None    Patient/family educated on Medicare website which has current facility and service quality ratings:    Education Provided on the Discharge Plan: Yes  Patient/Family in Agreement with the Plan:      Referrals Placed by CM/SW: Homecare (currently on home hospice)  Private pay costs discussed: private pay home aids/pca    Discussed  Partnership in Safe Discharge Planning  document with patient/family: Yes:     Handoff Completed: No, handoff not indicated or clinically appropriate    Additional Information:  SW spoke with Pt's daughter and identified a variety of hospice  options. SW attended care conference  with Pt,  Hospitalist, Palliative and Pt's daughter via phone.  SW identified that LTC with hospice can be  an option and obtained permission form Pt to request financial counseling assist in applying for MA.     Next Steps:   SW/RNCC will  continue to follow for safe discharge planning and placement.     Antonio Houston, Maine Medical CenterSW  10 ICU & [place] [facility]   Ph: [phone]

## 2025-01-08 NOTE — PROVIDER NOTIFICATION
Messaged  MD regarding pt's difficulty breathing. His oxygen is above 96 but is having a hard time catching his breath. Pt declined rescue inhaler, wanting a neb instead. Orders received for a one time neb received.

## 2025-01-08 NOTE — PLAN OF CARE
From 07 am to 11 pm    VS: VSS, pt denied CP some shortness of breathing during activity.   O2: On oxygen at 1 L per NC, sat. > 90%.   Output: Voiding adequate amount in the bathroom.    Last BM: 01/07/25   Activity: Up with walker and SBA.    Skin: Intact except Bruises on BUE   Pain: Some discomfort medicated with PRN medication.    Neuro: CMS and neuro intact to baseline.    Dressing: None.    Diet: Regular tolerating okay.    LDA: PIV SL.    Equipment: IV pole, walker and personal belongings.    Plan: TBD.    Additional Info:  bed alarm on for safety at night.   No change this evening.

## 2025-01-09 ENCOUNTER — APPOINTMENT (OUTPATIENT)
Dept: GENERAL RADIOLOGY | Facility: CLINIC | Age: 80
End: 2025-01-09
Attending: INTERNAL MEDICINE
Payer: MEDICARE

## 2025-01-09 ENCOUNTER — APPOINTMENT (OUTPATIENT)
Dept: SPEECH THERAPY | Facility: CLINIC | Age: 80
End: 2025-01-09
Attending: INTERNAL MEDICINE
Payer: MEDICARE

## 2025-01-09 VITALS
WEIGHT: 157.9 LBS | HEART RATE: 89 BPM | BODY MASS INDEX: 23.66 KG/M2 | SYSTOLIC BLOOD PRESSURE: 145 MMHG | TEMPERATURE: 95.7 F | RESPIRATION RATE: 16 BRPM | DIASTOLIC BLOOD PRESSURE: 70 MMHG | OXYGEN SATURATION: 80 %

## 2025-01-09 LAB
ALLEN'S TEST: YES
ANION GAP SERPL CALCULATED.3IONS-SCNC: 15 MMOL/L (ref 7–15)
BACTERIA BLD CULT: NO GROWTH
BACTERIA BLD CULT: NO GROWTH
BASE EXCESS BLDA CALC-SCNC: 6.5 MMOL/L (ref -3–3)
BUN SERPL-MCNC: 24.2 MG/DL (ref 8–23)
CALCIUM SERPL-MCNC: 9.3 MG/DL (ref 8.8–10.4)
CHLORIDE SERPL-SCNC: 101 MMOL/L (ref 98–107)
CREAT SERPL-MCNC: 0.67 MG/DL (ref 0.67–1.17)
EGFRCR SERPLBLD CKD-EPI 2021: >90 ML/MIN/1.73M2
ERYTHROCYTE [DISTWIDTH] IN BLOOD BY AUTOMATED COUNT: 13.8 % (ref 10–15)
GLUCOSE SERPL-MCNC: 118 MG/DL (ref 70–99)
HCO3 BLD-SCNC: 32 MMOL/L (ref 21–28)
HCO3 SERPL-SCNC: 26 MMOL/L (ref 22–29)
HCT VFR BLD AUTO: 44.8 % (ref 40–53)
HGB BLD-MCNC: 15.3 G/DL (ref 13.3–17.7)
MCH RBC QN AUTO: 31.9 PG (ref 26.5–33)
MCHC RBC AUTO-ENTMCNC: 34.2 G/DL (ref 31.5–36.5)
MCV RBC AUTO: 93 FL (ref 78–100)
MRSA DNA SPEC QL NAA+PROBE: NEGATIVE
O2/TOTAL GAS SETTING VFR VENT: 1 %
OXYHGB MFR BLDA: 85 % (ref 92–100)
PCO2 BLD: 49 MM HG (ref 35–45)
PH BLD: 7.43 [PH] (ref 7.35–7.45)
PLAT MORPH BLD: ABNORMAL
PLATELET # BLD AUTO: 42 10E3/UL (ref 150–450)
PO2 BLD: 52 MM HG (ref 80–105)
POLYCHROMASIA BLD QL SMEAR: SLIGHT
POTASSIUM SERPL-SCNC: 3.8 MMOL/L (ref 3.4–5.3)
PROCALCITONIN SERPL IA-MCNC: 0.12 NG/ML
RBC # BLD AUTO: 4.8 10E6/UL (ref 4.4–5.9)
RBC MORPH BLD: ABNORMAL
SA TARGET DNA: NEGATIVE
SAO2 % BLDA: 87.3 % (ref 95–96)
SODIUM SERPL-SCNC: 142 MMOL/L (ref 135–145)
WBC # BLD AUTO: 15.2 10E3/UL (ref 4–11)

## 2025-01-09 PROCEDURE — 99233 SBSQ HOSP IP/OBS HIGH 50: CPT | Performed by: INTERNAL MEDICINE

## 2025-01-09 PROCEDURE — 250N000009 HC RX 250

## 2025-01-09 PROCEDURE — 82805 BLOOD GASES W/O2 SATURATION: CPT | Performed by: INTERNAL MEDICINE

## 2025-01-09 PROCEDURE — 36415 COLL VENOUS BLD VENIPUNCTURE: CPT | Performed by: INTERNAL MEDICINE

## 2025-01-09 PROCEDURE — 250N000013 HC RX MED GY IP 250 OP 250 PS 637

## 2025-01-09 PROCEDURE — 71046 X-RAY EXAM CHEST 2 VIEWS: CPT | Mod: 26 | Performed by: RADIOLOGY

## 2025-01-09 PROCEDURE — 92610 EVALUATE SWALLOWING FUNCTION: CPT | Mod: GN

## 2025-01-09 PROCEDURE — 250N000013 HC RX MED GY IP 250 OP 250 PS 637: Performed by: STUDENT IN AN ORGANIZED HEALTH CARE EDUCATION/TRAINING PROGRAM

## 2025-01-09 PROCEDURE — 36600 WITHDRAWAL OF ARTERIAL BLOOD: CPT

## 2025-01-09 PROCEDURE — 250N000013 HC RX MED GY IP 250 OP 250 PS 637: Performed by: CLINICAL NURSE SPECIALIST

## 2025-01-09 PROCEDURE — 80048 BASIC METABOLIC PNL TOTAL CA: CPT | Performed by: INTERNAL MEDICINE

## 2025-01-09 PROCEDURE — 92526 ORAL FUNCTION THERAPY: CPT | Mod: GN

## 2025-01-09 PROCEDURE — 87640 STAPH A DNA AMP PROBE: CPT | Performed by: INTERNAL MEDICINE

## 2025-01-09 PROCEDURE — 250N000013 HC RX MED GY IP 250 OP 250 PS 637: Performed by: INTERNAL MEDICINE

## 2025-01-09 PROCEDURE — 85014 HEMATOCRIT: CPT | Performed by: INTERNAL MEDICINE

## 2025-01-09 PROCEDURE — 87641 MR-STAPH DNA AMP PROBE: CPT | Performed by: INTERNAL MEDICINE

## 2025-01-09 PROCEDURE — 82565 ASSAY OF CREATININE: CPT | Performed by: INTERNAL MEDICINE

## 2025-01-09 PROCEDURE — 94640 AIRWAY INHALATION TREATMENT: CPT

## 2025-01-09 PROCEDURE — 250N000012 HC RX MED GY IP 250 OP 636 PS 637: Performed by: INTERNAL MEDICINE

## 2025-01-09 PROCEDURE — 999N000157 HC STATISTIC RCP TIME EA 10 MIN

## 2025-01-09 PROCEDURE — 99233 SBSQ HOSP IP/OBS HIGH 50: CPT | Performed by: CLINICAL NURSE SPECIALIST

## 2025-01-09 PROCEDURE — 120N000002 HC R&B MED SURG/OB UMMC

## 2025-01-09 PROCEDURE — 82310 ASSAY OF CALCIUM: CPT | Performed by: INTERNAL MEDICINE

## 2025-01-09 PROCEDURE — 84145 PROCALCITONIN (PCT): CPT | Performed by: INTERNAL MEDICINE

## 2025-01-09 PROCEDURE — 250N000011 HC RX IP 250 OP 636: Performed by: INTERNAL MEDICINE

## 2025-01-09 PROCEDURE — 94640 AIRWAY INHALATION TREATMENT: CPT | Mod: 76

## 2025-01-09 PROCEDURE — 71046 X-RAY EXAM CHEST 2 VIEWS: CPT

## 2025-01-09 RX ORDER — PIPERACILLIN SODIUM, TAZOBACTAM SODIUM 4; .5 G/20ML; G/20ML
4.5 INJECTION, POWDER, LYOPHILIZED, FOR SOLUTION INTRAVENOUS EVERY 6 HOURS
Status: DISCONTINUED | OUTPATIENT
Start: 2025-01-09 | End: 2025-01-13

## 2025-01-09 RX ORDER — METHYLPREDNISOLONE SODIUM SUCCINATE 40 MG/ML
40 INJECTION INTRAMUSCULAR; INTRAVENOUS ONCE
Status: COMPLETED | OUTPATIENT
Start: 2025-01-09 | End: 2025-01-09

## 2025-01-09 RX ORDER — FUROSEMIDE 10 MG/ML
20 INJECTION INTRAMUSCULAR; INTRAVENOUS ONCE
Status: COMPLETED | OUTPATIENT
Start: 2025-01-09 | End: 2025-01-09

## 2025-01-09 RX ORDER — PREDNISONE 20 MG/1
20 TABLET ORAL 2 TIMES DAILY WITH MEALS
Status: DISCONTINUED | OUTPATIENT
Start: 2025-01-09 | End: 2025-01-09

## 2025-01-09 RX ORDER — FUROSEMIDE 10 MG/ML
40 INJECTION INTRAMUSCULAR; INTRAVENOUS DAILY
Status: DISCONTINUED | OUTPATIENT
Start: 2025-01-10 | End: 2025-01-13

## 2025-01-09 RX ORDER — POTASSIUM CHLORIDE 1500 MG/1
20 TABLET, EXTENDED RELEASE ORAL DAILY
Status: DISCONTINUED | OUTPATIENT
Start: 2025-01-09 | End: 2025-02-01

## 2025-01-09 RX ORDER — FUROSEMIDE 10 MG/ML
20 INJECTION INTRAMUSCULAR; INTRAVENOUS ONCE
Status: DISCONTINUED | OUTPATIENT
Start: 2025-01-09 | End: 2025-01-09

## 2025-01-09 RX ORDER — METHYLPREDNISOLONE SODIUM SUCCINATE 40 MG/ML
40 INJECTION INTRAMUSCULAR; INTRAVENOUS EVERY 8 HOURS
Status: DISCONTINUED | OUTPATIENT
Start: 2025-01-09 | End: 2025-01-13

## 2025-01-09 RX ADMIN — PIPERACILLIN AND TAZOBACTAM 4.5 G: 4; .5 INJECTION, POWDER, LYOPHILIZED, FOR SOLUTION INTRAVENOUS at 23:27

## 2025-01-09 RX ADMIN — Medication 1 HALF-TAB: at 14:23

## 2025-01-09 RX ADMIN — PREDNISONE 20 MG: 20 TABLET ORAL at 08:04

## 2025-01-09 RX ADMIN — METHOCARBAMOL 500 MG: 500 TABLET ORAL at 23:27

## 2025-01-09 RX ADMIN — AMLODIPINE BESYLATE 5 MG: 5 TABLET ORAL at 23:27

## 2025-01-09 RX ADMIN — CARBIDOPA AND LEVODOPA 1 TABLET: 25; 100 TABLET ORAL at 08:07

## 2025-01-09 RX ADMIN — MORPHINE SULFATE 10 MG: 20 SOLUTION ORAL at 14:23

## 2025-01-09 RX ADMIN — POTASSIUM CHLORIDE 20 MEQ: 1500 TABLET, EXTENDED RELEASE ORAL at 11:55

## 2025-01-09 RX ADMIN — Medication 1 HALF-TAB: at 20:16

## 2025-01-09 RX ADMIN — METOPROLOL SUCCINATE 50 MG: 50 TABLET, EXTENDED RELEASE ORAL at 08:05

## 2025-01-09 RX ADMIN — METHYLPREDNISOLONE SODIUM SUCCINATE 40 MG: 40 INJECTION, POWDER, FOR SOLUTION INTRAMUSCULAR; INTRAVENOUS at 11:56

## 2025-01-09 RX ADMIN — GABAPENTIN 400 MG: 300 CAPSULE ORAL at 20:16

## 2025-01-09 RX ADMIN — CARBIDOPA AND LEVODOPA 1 TABLET: 25; 100 TABLET ORAL at 20:16

## 2025-01-09 RX ADMIN — PIPERACILLIN AND TAZOBACTAM 4.5 G: 4; .5 INJECTION, POWDER, LYOPHILIZED, FOR SOLUTION INTRAVENOUS at 12:02

## 2025-01-09 RX ADMIN — MORPHINE SULFATE 10 MG: 20 SOLUTION ORAL at 20:16

## 2025-01-09 RX ADMIN — FUROSEMIDE 20 MG: 10 INJECTION, SOLUTION INTRAMUSCULAR; INTRAVENOUS at 09:59

## 2025-01-09 RX ADMIN — GABAPENTIN 400 MG: 300 CAPSULE ORAL at 08:05

## 2025-01-09 RX ADMIN — METHYLPREDNISOLONE SODIUM SUCCINATE 40 MG: 40 INJECTION, POWDER, FOR SOLUTION INTRAMUSCULAR; INTRAVENOUS at 20:21

## 2025-01-09 RX ADMIN — LEVOTHYROXINE SODIUM 112 MCG: 112 TABLET ORAL at 08:04

## 2025-01-09 RX ADMIN — Medication 1 HALF-TAB: at 08:04

## 2025-01-09 RX ADMIN — IPRATROPIUM BROMIDE AND ALBUTEROL SULFATE 3 ML: .5; 3 SOLUTION RESPIRATORY (INHALATION) at 08:47

## 2025-01-09 RX ADMIN — PIPERACILLIN AND TAZOBACTAM 4.5 G: 4; .5 INJECTION, POWDER, LYOPHILIZED, FOR SOLUTION INTRAVENOUS at 17:38

## 2025-01-09 RX ADMIN — IPRATROPIUM BROMIDE AND ALBUTEROL SULFATE 3 ML: .5; 3 SOLUTION RESPIRATORY (INHALATION) at 17:09

## 2025-01-09 RX ADMIN — FUROSEMIDE 20 MG: 10 INJECTION, SOLUTION INTRAVENOUS at 14:29

## 2025-01-09 RX ADMIN — METHOCARBAMOL 500 MG: 500 TABLET ORAL at 08:07

## 2025-01-09 RX ADMIN — METHOCARBAMOL 500 MG: 500 TABLET ORAL at 17:32

## 2025-01-09 RX ADMIN — IPRATROPIUM BROMIDE AND ALBUTEROL SULFATE 3 ML: .5; 3 SOLUTION RESPIRATORY (INHALATION) at 12:32

## 2025-01-09 RX ADMIN — GABAPENTIN 400 MG: 300 CAPSULE ORAL at 14:23

## 2025-01-09 RX ADMIN — MORPHINE SULFATE 10 MG: 20 SOLUTION ORAL at 08:07

## 2025-01-09 RX ADMIN — IPRATROPIUM BROMIDE AND ALBUTEROL SULFATE 3 ML: .5; 3 SOLUTION RESPIRATORY (INHALATION) at 21:20

## 2025-01-09 RX ADMIN — ROSUVASTATIN 10 MG: 10 TABLET, FILM COATED ORAL at 08:05

## 2025-01-09 RX ADMIN — CARBIDOPA AND LEVODOPA 1 TABLET: 25; 100 TABLET ORAL at 14:23

## 2025-01-09 RX ADMIN — METHOCARBAMOL 500 MG: 500 TABLET ORAL at 11:55

## 2025-01-09 ASSESSMENT — ACTIVITIES OF DAILY LIVING (ADL)
ADLS_ACUITY_SCORE: 40
ADLS_ACUITY_SCORE: 43
ADLS_ACUITY_SCORE: 40
ADLS_ACUITY_SCORE: 43
ADLS_ACUITY_SCORE: 40
ADLS_ACUITY_SCORE: 43
ADLS_ACUITY_SCORE: 40

## 2025-01-09 NOTE — PROGRESS NOTES
"[facility]    Medicine Progress Note - Hospitalist Service, GOLD TEAM 16    Date of Admission:  1/4/2025    Assessment & Plan   A: Patient is a 78 y/o man who has a past medical history significant for thyroid cancer with lung metastases, Parkinson's disease, neuropathy, glaucoma, osteoporosis, chronic pain, severe COPD, hyperlipidemia, obstructive sleep apnea and coronary artery disease. Patient presented on 04-Jan-2025 with a 7 day history of productive cough and a 1 day history of weakness. CT pulmonary angiogram was negative for pulmonary embolism. Patient appears to have COPD exacerbation but there is concern for community-acquired pneumonia as well. There is also concern that patient was in early sepsis on presentation.      Patient reportedly saw Pulmonology in Oct-2024 and COPD was moderately well-controlled at that time. Activity was apparently limited much more by his balance and parkinsonism than by respiratory status. Patient has antibiotics and prednisone available for outpatient COPD flares but apparently has not recently used this.      Patient apparently just entered hospice in the past week prior to hospital admission. Patient stated on admission that he was too weak and he and his daughter called their hospice agency and 911 was called and he was brought into the hospital. When provider asked on day of presentation whether patient wished for hospitalization and treatment of his potential infection, patient stated that he was seeking treatment and reported that his goal was to \"be able to do things for myself\". It was discussed that hospice typically focused on comfort rather than diagnostic testing and aggressive medical treatment. Patient acknowledged this but seemed unsure of what his true desires for his care are moving forward.      P:    1/9   Extensive discussion with daughter Gabriela and  and Palliative team   Patient wishes to be on restorative " cares and not wanting hospice at this time  We discussed that he is declining and recovery is unlikely due to progression of his chronic illness of metastatic thyroid cancer , parkinson disease,  severe COPD with emphysema and bronchiectasis and BRENNEN and post radiation induced lung fibrosis  .      Patient is doing worse today in am     Start IV steroids   Get ABG   Get labs   IV lasix   RT for nebs  Chest xray stat     Discussed with RT and bedside RN and care team rounds                   # Possible community-acquired pneumonia:  Continue Ceftriaxone      # COPD exacerbation:  - start IV steroids      #Chronic hypoxemic respiratory failure:  - Patient usually on 1-2 litres per minute of supplemental oxygen at baseline.  Per daughter he was on bipap at night ?      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  -     # Right upper lobe lung mass s/p radiation therapy with possible radiation pneumonitis:  - Patient had previous VATS biopsy in Feb-2022 that was non-diagnostic.     #Post-surgical hypothyroidism:  - Patient on levothyroxine 112 mcg daily.     # Rheumatoid arthritis with positive rheumatoid factor:  - Per chart review, patient had once been on methotrexate and prednisone but had stopped these medications a few years ago.  - Monitoring for symptoms.     #Coronary artery disease with past MI s/p stent placement:  - Patient usually on aspirin and crestor but these are currently on hold.   - Patient usually on metoprolol succinate 50 mg daily      #Hypertension:    CTM       # Hyperlipidemia:  - Patient usually on crestor      #Parkinson's disease:  - Patient on sinemet three times a day.     # Neuropathy, chronic pain syndrome:  - Patient currently on gabapentin 400 mg three times a day.  - Patient usually on methocarbamol but this is currently on hold.     # Glaucoma:  - Patient not currently on medication for this.  - Patient to f/u with  Ophthalmology as outpatient if further treatment is desired.     # Osteoporosis:  - Patient on prolia as outpatient.    # History of subarachnoid hemorrhage in 2023:  - No active intervention indicated.     # Acute thrombocytopenia:  CTM              Diet: Regular Diet Adult    DVT Prophylaxis: SCD  Mir Catheter: Not present  Lines: None     Cardiac Monitoring: None  Code Status: No CPR- Do NOT Intubate      Clinically Significant Risk Factors                 # Thrombocytopenia: Lowest platelets = 52 in last 2 days, will monitor for bleeding   # Hypertension: Noted on problem list                # Financial/Environmental Concerns:           Social Drivers of Health    Tobacco Use: Medium Risk (10/18/2024)    Patient History     Smoking Tobacco Use: Former     Smokeless Tobacco Use: Never   Physical Activity: Inactive (10/14/2024)    Exercise Vital Sign     Days of Exercise per Week: 0 days     Minutes of Exercise per Session: 0 min   Stress: Stress Concern Present (10/14/2024)    [place] [employer] - Occupational Stress Questionnaire     Feeling of Stress : Very much   Social Connections: Unknown (10/14/2024)    Social Connection and Isolation Panel [NHANES]     Frequency of Social Gatherings with Friends and Family: More than three times a week          Disposition Plan     Medically Ready for Discharge: Anticipated in 5+ Days             Samantha Niño MD  Hospitalist Service, GOLD TEAM 16  [facility]  Securely message with [employer] (more info)  Text page via [employer] Paging/Directory   See signed in provider for up to date coverage information  ______________________________________________________________________    Interval History   Coarse breathing today   No fever         Physical Exam   Vital Signs: Temp: 97.8  F (36.6  C) Temp src: Oral BP: (!) 154/83 Pulse: 92   Resp: 16 SpO2: 92 % O2 Device: Nasal cannula Oxygen Delivery: 1 LPM  Weight:  157 lbs 14.4 oz    Smiles .. I always feel crappy   Chest ; audible coarse BS   CVs ; RRR  GI ; soft abd , NT , Bs positive   Ext ; trace edema    Medical Decision Making       55 MINUTES SPENT BY ME on the date of service doing chart review, history, exam, documentation & further activities per the note.      Data

## 2025-01-09 NOTE — PLAN OF CARE
Reviewed ABG. PCo2 51 ( normal 40-50 )  Wbc elevated ) ? Steroids versus infection )   Stop CTX   Start pip/tazo for HAP  Check MRSA  IV lasix  IV steroids   CXR pending     4107   Left message with daughter Gabriela for update . Went to voice mail.  Will re-attempt tomorrow   CXR reviewed     Patient feeling better in afternoon

## 2025-01-09 NOTE — PROGRESS NOTES
"Care Management Follow Up    Length of Stay (days): 5    Expected Discharge Date: 01/11/2025     Concerns to be Addressed: discharge planning     Patient plan of care discussed at interdisciplinary rounds: Yes    Anticipated Discharge Disposition: Home, Hospice              Anticipated Discharge Services: Other (see comment) (current on home hospice)  Anticipated Discharge DME: None    Patient/family educated on Medicare website which has current facility and service quality ratings:    Education Provided on the Discharge Plan: Yes  Patient/Family in Agreement with the Plan:      Referrals Placed by CM/SW: Homecare (currently on home hospice)  Private pay costs discussed: insurance costs out of pocket expenses    Discussed  Partnership in Safe Discharge Planning  document with patient/family: Yes:     Handoff Completed: No, handoff not indicated or clinically appropriate    Additional Information:  Pt received communication from Financial worker requesting MA application paperwork be signed. Pt was in room with his sister who expressed concern about the government taking Pt's assets and \"I know people who have had their house taken in the past.\" Pt had previously expressed not having any financial resources. Pt identified that he had a unknown amount of money in a sealed by the court account. SW identified that if Pt and Pt's sister are concerned about applying for MA due to concerns about financial resources being taken then working with a Elder law  may be a good next step.JARAD provided the Senior Linkage Line phone number per Pt's sister request.     Next Steps:   JARAD/RNCC will continue to follow for safe discharge placement and planning.     Antonio Houston, LICSW  10 ICU & [place] [facility]   Ph: [phone]      "

## 2025-01-09 NOTE — PLAN OF CARE
Goal Outcome Evaluation:      Plan of Care Reviewed With: patient    Overall Patient Progress: improvingOverall Patient Progress: improving    VS: VSS, pt denied CP some shortness of breathing during activity.   O2: On oxygen at 1 L per NC, sat. > 90%.   Output: Voiding adequate amount in the bathroom.    Last BM: 01/07/25   Activity: Up with walker and SBA.    Skin: Intact except Bruises on BUE   Pain: Low back pain. Declined pain medication, pillow support and ice offered    Neuro: CMS and neuro intact to baseline.    Dressing: None.    Diet: Regular tolerating okay.    LDA: PIV SL.    Equipment: IV pole, walker and personal belongings.    Plan: TBD.    Additional Info:

## 2025-01-09 NOTE — PLAN OF CARE
Goal Outcome Evaluation:      Plan of Care Reviewed With: patient    Overall Patient Progress: no changeOverall Patient Progress: no change    Outcome Evaluation: Pt is A&O x 4. Elevated BPs at baseline, otherwise vitally stable. Denies CP and N/V. Continues to be SOB with activities and talking for long periods. Ax1 with walker/gait belt. On 2LPM O2 per MD change, goal SpO2 88-90%. Audible gurgling when breathing this AM, RT and MD informed, IV lasix ordered. Chronic back pain managed by scheduled medications. Uses call light appriopriatly and made needs known. Continue to monitor.      VS: BP (!) 152/76   Pulse 82   Temp 98.2  F (36.8  C)   Resp 20   Wt 71.6 kg (157 lb 14.4 oz)   SpO2 (!) 86%   BMI 23.66 kg/m     O2: On O2 @ 2LPM per NC, >90%   Output: Voiding adequate amount in the bathroom.    Last BM: 01/07/25   Activity: SBA with walker   Skin: Generalized bruising on BUE   Pain: Low back pain. Managed with scheduled morphine.   CMS: A&O x 4, intact   Dressing: None   Diet: Regular diet.   LDA: R PIV SL   Equipment: IV pole, walker, and personal belongings   Plan: TBD   Additional Info: Telemetry.

## 2025-01-10 ENCOUNTER — APPOINTMENT (OUTPATIENT)
Dept: GENERAL RADIOLOGY | Facility: CLINIC | Age: 80
End: 2025-01-10
Attending: HOSPITALIST
Payer: MEDICARE

## 2025-01-10 ENCOUNTER — APPOINTMENT (OUTPATIENT)
Dept: SPEECH THERAPY | Facility: CLINIC | Age: 80
End: 2025-01-10
Payer: MEDICARE

## 2025-01-10 LAB
ANION GAP SERPL CALCULATED.3IONS-SCNC: 17 MMOL/L (ref 7–15)
BUN SERPL-MCNC: 22.4 MG/DL (ref 8–23)
CALCIUM SERPL-MCNC: 9.9 MG/DL (ref 8.8–10.4)
CHLORIDE SERPL-SCNC: 99 MMOL/L (ref 98–107)
CREAT SERPL-MCNC: 0.91 MG/DL (ref 0.67–1.17)
EGFRCR SERPLBLD CKD-EPI 2021: 87 ML/MIN/1.73M2
ERYTHROCYTE [DISTWIDTH] IN BLOOD BY AUTOMATED COUNT: 13.5 % (ref 10–15)
GLUCOSE SERPL-MCNC: 124 MG/DL (ref 70–99)
HCO3 SERPL-SCNC: 25 MMOL/L (ref 22–29)
HCT VFR BLD AUTO: 44.6 % (ref 40–53)
HGB BLD-MCNC: 14.9 G/DL (ref 13.3–17.7)
MCH RBC QN AUTO: 30.5 PG (ref 26.5–33)
MCHC RBC AUTO-ENTMCNC: 33.4 G/DL (ref 31.5–36.5)
MCV RBC AUTO: 91 FL (ref 78–100)
PLATELET # BLD AUTO: 54 10E3/UL (ref 150–450)
POTASSIUM SERPL-SCNC: 4.6 MMOL/L (ref 3.4–5.3)
POTASSIUM SERPL-SCNC: 4.6 MMOL/L (ref 3.4–5.3)
RBC # BLD AUTO: 4.89 10E6/UL (ref 4.4–5.9)
SODIUM SERPL-SCNC: 141 MMOL/L (ref 135–145)
WBC # BLD AUTO: 13.7 10E3/UL (ref 4–11)

## 2025-01-10 PROCEDURE — 80048 BASIC METABOLIC PNL TOTAL CA: CPT | Performed by: INTERNAL MEDICINE

## 2025-01-10 PROCEDURE — 74230 X-RAY XM SWLNG FUNCJ C+: CPT | Mod: 26 | Performed by: STUDENT IN AN ORGANIZED HEALTH CARE EDUCATION/TRAINING PROGRAM

## 2025-01-10 PROCEDURE — 250N000013 HC RX MED GY IP 250 OP 250 PS 637

## 2025-01-10 PROCEDURE — 250N000013 HC RX MED GY IP 250 OP 250 PS 637: Performed by: STUDENT IN AN ORGANIZED HEALTH CARE EDUCATION/TRAINING PROGRAM

## 2025-01-10 PROCEDURE — 94640 AIRWAY INHALATION TREATMENT: CPT | Mod: 76

## 2025-01-10 PROCEDURE — 250N000009 HC RX 250

## 2025-01-10 PROCEDURE — 250N000011 HC RX IP 250 OP 636: Performed by: INTERNAL MEDICINE

## 2025-01-10 PROCEDURE — 120N000002 HC R&B MED SURG/OB UMMC

## 2025-01-10 PROCEDURE — 92526 ORAL FUNCTION THERAPY: CPT | Mod: GN

## 2025-01-10 PROCEDURE — 36415 COLL VENOUS BLD VENIPUNCTURE: CPT | Performed by: STUDENT IN AN ORGANIZED HEALTH CARE EDUCATION/TRAINING PROGRAM

## 2025-01-10 PROCEDURE — 250N000013 HC RX MED GY IP 250 OP 250 PS 637: Performed by: CLINICAL NURSE SPECIALIST

## 2025-01-10 PROCEDURE — 36415 COLL VENOUS BLD VENIPUNCTURE: CPT | Performed by: INTERNAL MEDICINE

## 2025-01-10 PROCEDURE — 84132 ASSAY OF SERUM POTASSIUM: CPT | Performed by: STUDENT IN AN ORGANIZED HEALTH CARE EDUCATION/TRAINING PROGRAM

## 2025-01-10 PROCEDURE — 99233 SBSQ HOSP IP/OBS HIGH 50: CPT | Performed by: INTERNAL MEDICINE

## 2025-01-10 PROCEDURE — 92611 MOTION FLUOROSCOPY/SWALLOW: CPT | Mod: GN

## 2025-01-10 PROCEDURE — 999N000157 HC STATISTIC RCP TIME EA 10 MIN

## 2025-01-10 PROCEDURE — 82310 ASSAY OF CALCIUM: CPT | Performed by: INTERNAL MEDICINE

## 2025-01-10 PROCEDURE — 85041 AUTOMATED RBC COUNT: CPT | Performed by: INTERNAL MEDICINE

## 2025-01-10 PROCEDURE — 74230 X-RAY XM SWLNG FUNCJ C+: CPT

## 2025-01-10 PROCEDURE — 94640 AIRWAY INHALATION TREATMENT: CPT

## 2025-01-10 PROCEDURE — 85014 HEMATOCRIT: CPT | Performed by: INTERNAL MEDICINE

## 2025-01-10 PROCEDURE — 250N000013 HC RX MED GY IP 250 OP 250 PS 637: Performed by: INTERNAL MEDICINE

## 2025-01-10 RX ORDER — BARIUM SULFATE 400 MG/ML
SUSPENSION ORAL ONCE
Status: COMPLETED | OUTPATIENT
Start: 2025-01-10 | End: 2025-01-10

## 2025-01-10 RX ADMIN — AMLODIPINE BESYLATE 5 MG: 5 TABLET ORAL at 22:23

## 2025-01-10 RX ADMIN — MORPHINE SULFATE 10 MG: 20 SOLUTION ORAL at 20:22

## 2025-01-10 RX ADMIN — IPRATROPIUM BROMIDE AND ALBUTEROL SULFATE 3 ML: .5; 3 SOLUTION RESPIRATORY (INHALATION) at 12:46

## 2025-01-10 RX ADMIN — Medication 1 HALF-TAB: at 08:03

## 2025-01-10 RX ADMIN — METHYLPREDNISOLONE SODIUM SUCCINATE 40 MG: 40 INJECTION, POWDER, FOR SOLUTION INTRAMUSCULAR; INTRAVENOUS at 20:22

## 2025-01-10 RX ADMIN — METOPROLOL SUCCINATE 50 MG: 50 TABLET, EXTENDED RELEASE ORAL at 08:04

## 2025-01-10 RX ADMIN — ROSUVASTATIN 10 MG: 10 TABLET, FILM COATED ORAL at 08:03

## 2025-01-10 RX ADMIN — BARIUM SULFATE 10 ML: 400 SUSPENSION ORAL at 14:22

## 2025-01-10 RX ADMIN — METHYLPREDNISOLONE SODIUM SUCCINATE 40 MG: 40 INJECTION, POWDER, FOR SOLUTION INTRAMUSCULAR; INTRAVENOUS at 05:41

## 2025-01-10 RX ADMIN — GABAPENTIN 400 MG: 300 CAPSULE ORAL at 20:22

## 2025-01-10 RX ADMIN — METHYLPREDNISOLONE SODIUM SUCCINATE 40 MG: 40 INJECTION, POWDER, FOR SOLUTION INTRAMUSCULAR; INTRAVENOUS at 12:05

## 2025-01-10 RX ADMIN — IPRATROPIUM BROMIDE AND ALBUTEROL SULFATE 3 ML: .5; 3 SOLUTION RESPIRATORY (INHALATION) at 16:25

## 2025-01-10 RX ADMIN — GABAPENTIN 400 MG: 300 CAPSULE ORAL at 08:03

## 2025-01-10 RX ADMIN — PIPERACILLIN AND TAZOBACTAM 4.5 G: 4; .5 INJECTION, POWDER, LYOPHILIZED, FOR SOLUTION INTRAVENOUS at 05:41

## 2025-01-10 RX ADMIN — GABAPENTIN 400 MG: 300 CAPSULE ORAL at 13:25

## 2025-01-10 RX ADMIN — MORPHINE SULFATE 10 MG: 20 SOLUTION ORAL at 13:28

## 2025-01-10 RX ADMIN — IPRATROPIUM BROMIDE AND ALBUTEROL SULFATE 3 ML: .5; 3 SOLUTION RESPIRATORY (INHALATION) at 21:25

## 2025-01-10 RX ADMIN — PIPERACILLIN AND TAZOBACTAM 4.5 G: 4; .5 INJECTION, POWDER, LYOPHILIZED, FOR SOLUTION INTRAVENOUS at 18:16

## 2025-01-10 RX ADMIN — FUROSEMIDE 40 MG: 10 INJECTION, SOLUTION INTRAMUSCULAR; INTRAVENOUS at 08:09

## 2025-01-10 RX ADMIN — Medication 1 HALF-TAB: at 13:25

## 2025-01-10 RX ADMIN — PIPERACILLIN AND TAZOBACTAM 4.5 G: 4; .5 INJECTION, POWDER, LYOPHILIZED, FOR SOLUTION INTRAVENOUS at 12:10

## 2025-01-10 RX ADMIN — CARBIDOPA AND LEVODOPA 1 TABLET: 25; 100 TABLET ORAL at 08:03

## 2025-01-10 RX ADMIN — METHOCARBAMOL 500 MG: 500 TABLET ORAL at 12:04

## 2025-01-10 RX ADMIN — METHOCARBAMOL 500 MG: 500 TABLET ORAL at 16:48

## 2025-01-10 RX ADMIN — POTASSIUM CHLORIDE 20 MEQ: 1500 TABLET, EXTENDED RELEASE ORAL at 08:03

## 2025-01-10 RX ADMIN — METHOCARBAMOL 500 MG: 500 TABLET ORAL at 08:03

## 2025-01-10 RX ADMIN — MORPHINE SULFATE 10 MG: 20 SOLUTION ORAL at 08:08

## 2025-01-10 RX ADMIN — IPRATROPIUM BROMIDE AND ALBUTEROL SULFATE 3 ML: .5; 3 SOLUTION RESPIRATORY (INHALATION) at 09:51

## 2025-01-10 RX ADMIN — Medication 1 HALF-TAB: at 20:22

## 2025-01-10 RX ADMIN — METHOCARBAMOL 500 MG: 500 TABLET ORAL at 20:22

## 2025-01-10 RX ADMIN — BARIUM SULFATE 20 ML: 400 SUSPENSION ORAL at 14:23

## 2025-01-10 RX ADMIN — LEVOTHYROXINE SODIUM 112 MCG: 112 TABLET ORAL at 08:03

## 2025-01-10 RX ADMIN — CARBIDOPA AND LEVODOPA 1 TABLET: 25; 100 TABLET ORAL at 13:25

## 2025-01-10 RX ADMIN — CARBIDOPA AND LEVODOPA 1 TABLET: 25; 100 TABLET ORAL at 20:22

## 2025-01-10 ASSESSMENT — ACTIVITIES OF DAILY LIVING (ADL)
ADLS_ACUITY_SCORE: 43

## 2025-01-10 NOTE — PROGRESS NOTES
"Speech-Language Pathology: Clinical Swallow Evaluation      01/09/25 8213   Appointment Info   Signing Clinician's Name / Credentials (SLP) Cleo Palacios MA, CCC-SLP   General Information   Onset of Illness/Injury or Date of Surgery 01/04/25   Referring Physician Samantha Niño MD   Pertinent History of Current Problem Per provider progress notes, \"Patient is a 78 y/o man who has a past medical history significant for thyroid cancer with lung metastases, Parkinson's disease, neuropathy, glaucoma, osteoporosis, chronic pain, severe COPD, hyperlipidemia, obstructive sleep apnea and coronary artery disease. Patient presented on 04-Jan-2025 with a 7 day history of productive cough and a 1 day history of weakness. CT pulmonary angiogram was negative for pulmonary embolism. Patient appears to have COPD exacerbation but there is concern for community-acquired pneumonia as well. There is also concern that patient was in early sepsis on presentation.\" Speech Therapy was consulted due to evaluate swallow due to concerns for increased aspiration risk.   General Observations Patient alert and cooperative. His sister, Freeman, was present for part of session. Patient was noted to have multiple sweets at bedside, including cookies and several different kinds of candy.   Type of Evaluation   Type of Evaluation Swallow Evaluation   Oral Motor   Oral Musculature generally intact   Structural Abnormalities none present   Mucosal Quality adequate   Dentition (Oral Motor)   Comment, Dentition (Oral Motor) Patient reports that he has a permanent dental bridge.   Dentition (Oral Motor) natural dentition;dental appliance/dentures   Facial Symmetry (Oral Motor)   Facial Symmetry (Oral Motor) WNL   Lip Function (Oral Motor)   Lip Range of Motion (Oral Motor) WNL   Lip Strength (Oral Motor) WNL   Comment, Lip Function (Oral Motor) Lip function was WNL   Tongue Function (Oral Motor)   Tongue Strength (Oral Motor) WNL   Tongue Coordination/Speed " (Oral Motor) WNL   Tongue ROM (Oral Motor) WNL   Comment, Tongue Function (Oral Motor) Lingual function was WNL   Jaw Function (Oral Motor)   Jaw Function (Oral Motor) WNL   Cough/Swallow/Gag Reflex (Oral Motor)   Volitional Throat Clear/Cough (Oral Motor) WNL   Volitional Swallow (Oral Motor) WNL   Vocal Quality/Secretion Management (Oral Motor)   Vocal Quality (Oral Motor) WNL   Secretion Management (Oral Motor) WNL   Comment, Vocal Quality/Secretion Management (Oral Motor) Vocal quality was clear. Patient became SOB when talking.   General Swallowing Observations   Past History of Dysphagia Patient endorses occasional coughing with PO intake. No prior history of swallowing evaluation(s) per cursory review of EMR.   Respiratory Support room air   Current Diet/Method of Nutritional Intake (General Swallowing Observations, NIS) regular diet;thin liquids (level 0)   Swallowing Evaluation Clinical swallow evaluation   Clinical Swallow Evaluation   Feeding Assistance no assistance needed   Clinical Swallow Evaluation Textures Trialed thin liquids;pureed;solid foods   Clinical Swallow Eval: Thin Liquid Texture Trial   Mode of Presentation, Thin Liquids cup;straw;self-fed   Volume of Liquid or Food Presented >3 ounces   Oral Phase of Swallow WFL   Pharyngeal Phase of Swallow coughing/choking   Diagnostic Statement Slightly delayed cough noted x3. This occurred with consecutive sips taken by cup and sip taken by straw. No overt s/sx of aspiration noted with small, single sips taken by cup.   Clinical Swallow Evaluation: Puree Solid Texture Trial   Mode of Presentation, Puree spoon;fed by clinician   Volume of Puree Presented 2 ounces   Oral Phase, Puree WFL   Pharyngeal Phase, Puree intact   Diagnostic Statement Oropharyngeal swallow function appeared WNL with puree consistency. No overt clinical s/sx of aspiration observed.   Clinical Swallow Evaluation: Solid Food Texture Trial   Mode of Presentation self-fed   Volume  Presented Several bites  (1 josseline cracker)   Oral Phase WFL   Pharyngeal Phase intact   Diagnostic Statement Patient demonstrated mildly prolonged but effective mastication and good oral clearance. No overt clinical s/sx of aspiration observed.   Esophageal Phase of Swallow   Patient reports or presents with symptoms of esophageal dysphagia Yes   Esophageal comments Patient reports that foods and pills sometimes stick in his throat/chest.   Swallowing Recommendations   Diet Consistency Recommendations regular diet;thin liquids (level 0)   Supervision Level for Intake patient independent   Mode of Delivery Recommendations bolus size, small;slow rate of intake;no straws   Monitoring/Assistance Required (Eating/Swallowing) stop eating activities when fatigue is present;monitor for cough or change in vocal quality with intake   Recommended Feeding/Eating Techniques (Swallow Eval) maintain upright sitting position for eating;minimize distractions during oral intake   Medication Administration Recommendations, Swallowing (SLP) Recommend that pills be given 1 at a time with small sips of liquid or mixed in puree.   Instrumental Assessment Recommendations VFSS (videofluoroscopic swallowing study)   General Therapy Interventions   Planned Therapy Interventions Dysphagia Treatment   Dysphagia treatment Instruction of safe swallow strategies;Compensatory strategies for swallowing   Clinical Impression   Criteria for Skilled Therapeutic Interventions Met (SLP Eval) Yes, treatment indicated   SLP Diagnosis Dysphagia   Risks & Benefits of therapy have been explained evaluation/treatment results reviewed;care plan/treatment goals reviewed;risks/benefits reviewed;participants voiced agreement with care plan;participants included;patient;sibling   Clinical Impression Comments Clinical swallow evaluation completed per provider order. No oral dysphagia observed with the consistencies given. Patient presented with cough x3 with thin  liquid when taking either consecutive sips or larger sips by straw. No overt clinical s/sx of aspiration noted with small, single sips taken by cup. Given patient's history of thyroid cancer, Parkinson's disease, & severe COPD with emphysema, recommend further assessment via instrumental evaluation (i.e., VFSS). At this time, there is insufficient evidence to warrant diet modification. Patient appears appropriate to continue current diet of Regular textures and thin liquids with use of the safe swallowing strategies listed above. Anticipate VFSS on 1/10/25 if Radiology able to accommodate.   SLP Total Evaluation Time   Eval: oral/pharyngeal swallow function, clinical swallow Minutes (57187) 15   Interventions   Interventions Quick Adds Swallowing Dysfunction   Swallowing Intervention   Treatment of Swallowing Dysfunction &/or Oral Function for Feeding Minutes (48448) 72   SLP Discharge Planning   SLP Plan VFSS. Needs to be scheduled.   SLP Discharge Recommendation home with assist   SLP Rationale for DC Rec Need for ongoing ST at discharge to be determined pending results of VFSS and patient's goals of care.   SLP Brief overview of current status  At this time, patient appears appropriate to continue current diet of Regular textures and thin liquids. To maximize safety of oral intake, he should sit fully upright (preferably in chair) for all intake, eat slowly, take small bites/sips, and chew foods thoroughly. No straws with liquids d/t increased aspiration risk.

## 2025-01-10 NOTE — PROGRESS NOTES
Care Management Follow Up    Length of Stay (days): 6    Expected Discharge Date: 01/13/25     Concerns to be Addressed: discharge planning     Patient plan of care discussed at interdisciplinary rounds: Yes    Anticipated Discharge Disposition: Home, Hospice              Anticipated Discharge Services: Other (see comment) (current on home hospice)  Anticipated Discharge DME: None    Patient/family educated on Medicare website which has current facility and service quality ratings:    Education Provided on the Discharge Plan: Yes  Patient/Family in Agreement with the Plan:      Referrals Placed by CM/SW: Homecare (currently on home hospice)  Private pay costs discussed: private room/amenity fees    Discussed  Partnership in Safe Discharge Planning  document with patient/family: Yes:     Handoff Completed: No, handoff not indicated or clinically appropriate    Additional Information:  SW spoke with Financial counseling and updated on conversation SW had with Pt and Pt's sister on 1/09/25. Financial counseling will speak with Pt and Pt's daughter on Monday and identify that if Pt applies for MA the government will not try to take Pt's daughter's home if that is a concern. Plan continues to be to move towards hospice and it remains unclear if Pt fully understands that he is actively dying.     Next Steps:   SW/RNCC will continue to follow for safe discharge placement and planning.     KINA Peter

## 2025-01-10 NOTE — PROGRESS NOTES
"Speech-Language Pathology: Video Swallow Study     01/10/25 1300   Appointment Info   Signing Clinician's Name / Credentials (SLP) ANTONIA Longoria   General Information   Onset of Illness/Injury or Date of Surgery 01/04/25   Pertinent History of Current Problem VFSS recommend to assess for aspiration given his severe COPD, Parkinson's and thyroid ca. Per provider progress notes, \"Patient is a 78 y/o man who has a past medical history significant for thyroid cancer with lung metastases, Parkinson's disease, neuropathy, glaucoma, osteoporosis, chronic pain, severe COPD, hyperlipidemia, obstructive sleep apnea and coronary artery disease. Patient presented on 04-Jan-2025 with a 7 day history of productive cough and a 1 day history of weakness. CT pulmonary angiogram was negative for pulmonary embolism. Patient appears to have COPD exacerbation but there is concern for community-acquired pneumonia as well. There is also concern that patient was in early sepsis on presentation.\" Speech Therapy was consulted due to evaluate swallow due to concerns for increased aspiration risk.   Type of Evaluation   Type of Evaluation Swallow Evaluation   General Swallowing Observations   Past History of Dysphagia Patient endorses occasional coughing with PO intake. No prior history of swallowing evaluation(s) per cursory review of EMR.   Respiratory Support room air   Current Diet/Method of Nutritional Intake (General Swallowing Observations, NIS) regular diet;thin liquids (level 0)   Swallowing Evaluation Videofluoroscopic swallow study (VFSS)   VFSS Evaluation   VFSS Textures Trialed thin liquids;mildly thick liquids;moderately thick liquids/liquidized;pureed;solid foods   VFSS Eval: Thin Liquid Texture Trial   Mode of Presentation, Thin Liquid cup;self-fed   Order of Presentation 1,2,3,5   Preparatory Phase poor bolus control   Oral Phase, Thin Liquid premature pharyngeal entry   Bolus Location When Swallow Triggered posterior " laryngeal surface of epiglottis;valleculae   Pharyngeal Phase, Thin Liquid impaired epiglottic movement   Rosenbek's Penetration Aspiration Scale: Thin Liquid Trial Results 4 - contrast contacts vocal cords, no residue remains (penetration)   Response to Aspiration absent response   Strategies and Compensations chin tuck   Diagnostic Statement Penetration to the cords with possible aspiration x1 with no cough response. Small sip with chin tuck reduces risk significantly.   VFSS Eval: Mildly Thick Liquids   Mode of Presentation cup;self-fed   Order of Presentation 6,7   Preparatory Phase WFL   Oral Phase WFL   Bolus Location When Swallow Triggered valleculae   Pharyngeal Phase WFL   Rosenbek's Penetration Aspiration Scale 1 - no aspiration, contrast does not enter airway   VFSS Eval: Moderately Thick Liquids   Mode of Presentation spoon;fed by clinician   Order of Presentation 8,9   Preparatory Phase WFL   Oral Phase premature pharyngeal entry   Bolus Location When Swallow Triggered valleculae   Pharyngeal Phase impaired epiglottic movement;other (see comments);residue in pyriform sinus  (delayed epiglottic inversion)   Rosenbek's Penetration Aspiration Scale 1 - no aspiration, contrast does not enter airway   Diagnostic Statement mild base of pyriform stasis that eventually cleare   VFSS Evaluation: Puree Solid Texture Trial   Mode of Presentation, Puree spoon   Order of Presentation 10   Preparatory Phase WFL   Oral Phase, Puree premature pharyngeal entry   Bolus Location When Swallow Triggered valleculae   Pharyngeal Phase, Puree impaired epiglottic movement;other (see comments);residue in pyriform sinus  (delayed epiglottic inversion)   Rosenbek's Penetration Aspiration Scale: Puree Food Trial Results 1 - no aspiration, contrast does not enter airway   Diagnostic Statement mild base of pyriform stasis that eventually cleare   VFSS Evaluation: Solid Food Texture Trial   Mode of Presentation, Solid spoon   Order of  Presentation 11   Preparatory Phase WFL   Oral Phase, Solid premature pharyngeal entry   Bolus Location When Swallow Triggered valleculae   Pharyngeal Phase, Solid impaired epiglottic movement;residue in pyriform sinus;other (see comments)  (mildly delayed epiglottic inversion)   Rosenbek's Penetration Aspiration Scale: Solid Food Trial Results 1 - no aspiration, contrast does not enter airway   Diagnostic Statement mild base of pyriform stasis that eventually cleare   Swallowing Recommendations   Diet Consistency Recommendations mildly thick liquids (level 2);regular diet   Supervision Level for Intake patient independent   Mode of Delivery Recommendations bolus size, small   Comment, Swallowing Recommendations regular/mildly thick until pt can learn small sip/chin tuck strategy with thin   General Therapy Interventions   Planned Therapy Interventions Dysphagia Treatment   Dysphagia treatment Compensatory strategies for swallowing   Intervention Comments small sip/chin tuck   Clinical Impression   Criteria for Skilled Therapeutic Interventions Met (SLP Andria) Yes, treatment indicated   SLP Diagnosis dysphagia   Risks & Benefits of therapy have been explained evaluation/treatment results reviewed;care plan/treatment goals reviewed;risks/benefits reviewed;participants voiced agreement with care plan;participants included;patient;daughter   Clinical Impression Comments VFSS completed using thin, mildly thick, moderately thick, puree and solid textures. Pt had penetration to cords with questionable aspiration with initial drink of thin liquids with NO COUGH response. This was caused by delayed swallow trigger. Pt instructed to use small sip/chin tuck strategy which showed no penetration or aspiration. No penetration or aspiration noted with m ildly thick, puree, moderately thick or solid textures. Recommend regular diet with mildly thick liquids until pt can learn strategy of small sips/chin tuck independently.   SLP  Total Evaluation Time   Evaluation, videofluoroscopic eval of swallow function Minutes (40546) 30   SLP Goals   Therapy Frequency (SLP Eval) 5 times/week   SLP Predicted Duration/Target Date for Goal Attainment 01/17/25   SLP Goals Swallow   SLP: Safely tolerate diet without signs/symptoms of aspiration Regular diet;Thin liquids;With use of compensatory swallow strategies;Independently   Interventions   Interventions Quick Adds Swallowing Dysfunction   Swallowing Intervention   Treatment of Swallowing Dysfunction &/or Oral Function for Feeding Minutes (01873) 10   Treatment Detail/Skilled Intervention Angela and daughter eduated on results and recommendations from VFSS. Pt was able to demonstrate strategy of small sip/chin tuck with thin liquids with mod cues. When asked, pt stated that he would need help to remember to use this strategy. Mildly thick liquids were educated to pt and nayanaer to compensate for asp risk and until pt can independently learn chin tuck strategy. They were shown example of mildly thick liquids and educated on staff on floor being able to thicken  liquids brought from home and that if ordered from the kitchen, it would be already modified. They verbalized understandng.   SLP Discharge Planning   SLP Plan teach small sip/chin tuck stratedy with thin. Once he is independent, upgrade to thin and dc if no further issues.   SLP Discharge Recommendation home with assist   SLP Rationale for DC Rec below baseline for swallow   SLP Brief overview of current status  VFSS 1/10/25 rec of reg/mildly thick until pt can independently follow compensatory strategy of small sip/chin tuck with thin liquids.   SLP Time and Intention   Total Session Time (sum of timed and untimed services) 40

## 2025-01-10 NOTE — PROGRESS NOTES
"[facility]    Medicine Progress Note - Hospitalist Service, GOLD TEAM 16    Date of Admission:  1/4/2025    Assessment & Plan   A: Patient is a 80 y/o man who has a past medical history significant for thyroid cancer with lung metastases, Parkinson's disease, neuropathy, glaucoma, osteoporosis, chronic pain, severe COPD, hyperlipidemia, obstructive sleep apnea and coronary artery disease. Patient presented on 04-Jan-2025 with a 7 day history of productive cough and a 1 day history of weakness. CT pulmonary angiogram was negative for pulmonary embolism. Patient appears to have COPD exacerbation but there is concern for community-acquired pneumonia as well. There is also concern that patient was in early sepsis on presentation.      Patient reportedly saw Pulmonology in Oct-2024 and COPD was moderately well-controlled at that time. Activity was apparently limited much more by his balance and parkinsonism than by respiratory status. Patient has antibiotics and prednisone available for outpatient COPD flares but apparently has not recently used this.      Patient apparently just entered hospice in the past week prior to hospital admission. Patient stated on admission that he was too weak and he and his daughter called their hospice agency and 911 was called and he was brought into the hospital. When provider asked on day of presentation whether patient wished for hospitalization and treatment of his potential infection, patient stated that he was seeking treatment and reported that his goal was to \"be able to do things for myself\". It was discussed that hospice typically focused on comfort rather than diagnostic testing and aggressive medical treatment. Patient acknowledged this but seemed unsure of what his true desires for his care are moving forward.      P:    1/10    Video swallow today   Continue IV lasix and IV steroids  Zosyn started 1/10/24.  Discussed with RT and " bedside RN and care team rounds   Cbc reviewed . BMP pending   Discontinue tele                  # Possible community-acquired pneumonia on admission .Now HAP . On zosyn 2/5 days       # COPD exacerbation:  IV steroids      #Chronic hypoxemic respiratory failure:  - Patient usually on 1-2 litres per minute of supplemental oxygen at baseline.  Per daughter he was on bipap at night ?   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  -     # Right upper lobe lung mass s/p radiation therapy with possible radiation pneumonitis:  - Patient had previous VATS biopsy in Feb-2022 that was non-diagnostic.     #Post-surgical hypothyroidism:  - Patient on levothyroxine 112 mcg daily.     # Rheumatoid arthritis with positive rheumatoid factor:  - Per chart review, patient had once been on methotrexate and prednisone but had stopped these medications a few years ago.  Out patient follow up      #Coronary artery disease with past MI s/p stent placement:  - Patient usually on aspirin and crestor but these are currently on hold.   -continue metoprolol succinate 50 mg daily      #Hypertension:    CTM       # Hyperlipidemia:  - Patient usually on crestor      #Parkinson's disease:  - Patient on sinemet three times a day.     # Neuropathy, chronic pain syndrome:  - Patient currently on gabapentin 400 mg three times a day.  - Patient usually on methocarbamol but this is currently on hold.     # Glaucoma:  - Patient not currently on medication for this.  - Patient to f/u with Ophthalmology as outpatient if further treatment is desired.     # Osteoporosis:  - Patient on prolia as outpatient.    # History of subarachnoid hemorrhage in 2023:  - No active intervention indicated.     # Acute thrombocytopenia:  CTM              Diet: Regular Diet Adult    DVT Prophylaxis: Pneumatic Compression Devices  Mir Catheter: Not present  Lines: None      Cardiac Monitoring: ACTIVE order. Indication: SOB  Code Status: No CPR- Do NOT Intubate      Clinically Significant Risk Factors                 # Thrombocytopenia: Lowest platelets = 42 in last 2 days, will monitor for bleeding   # Hypertension: Noted on problem list     # Acute Hypoxic Respiratory Failure: Documented O2 saturation < 90%. Continue supplemental oxygen as needed  # Acute Hypoxic Respiratory Failure: Documented O2 saturation < 90%. Continue supplemental oxygen as needed             # Financial/Environmental Concerns:           Social Drivers of Health    Tobacco Use: Medium Risk (10/18/2024)    Patient History     Smoking Tobacco Use: Former     Smokeless Tobacco Use: Never   Physical Activity: Inactive (10/14/2024)    Exercise Vital Sign     Days of Exercise per Week: 0 days     Minutes of Exercise per Session: 0 min   Stress: Stress Concern Present (10/14/2024)    [employer] - Occupational Stress Questionnaire     Feeling of Stress : Very much   Social Connections: Unknown (10/14/2024)    Social Connection and Isolation Panel [NHANES]     Frequency of Social Gatherings with Friends and Family: More than three times a week          Disposition Plan     Medically Ready for Discharge: Anticipated in 5+ Days             Samantha Niño MD  Hospitalist Service, [address]  Securely message with [employer] (more info)  Text page via [employer] Paging/Directory   See signed in provider for up to date coverage information  ______________________________________________________________________    Interval History   Feels better   Gets out of breath with any sort of exertion   No chest discomfort   Enjoying cookies and gummy bears    Physical Exam   Vital Signs: Temp: 98.6  F (37  C) Temp src: Oral BP: 148/72 Pulse: 89   Resp: 16 SpO2: 90 % O2 Device: Nasal cannula Oxygen Delivery: 2 LPM  Weight: 157 lbs 14.4 oz          Alert and oriented . Moderate distress  Breath sounds are reduced , coarse and crackles through out   Cardiovascular Exam ; S1 & S2 .  GI ; Abdomen is soft and non tender. BS positive .  Skin ; no jaundice noted.    Medical Decision Making       51 MINUTES SPENT BY ME on the date of service doing chart review, history, exam, documentation & further activities per the note.      Data     I have personally reviewed the following data over the past 24 hrs:    13.7 (H)  \   14.9   / 54 (L)     141 99 22.4 /  124 (H)   4.6; 4.6 25 0.91 \     Procal: N/A CRP: N/A Lactic Acid: N/A

## 2025-01-10 NOTE — PLAN OF CARE
Goal Outcome Evaluation:      Plan of Care Reviewed With: patient    Overall Patient Progress: improvingOverall Patient Progress: improving       VS: VSS   O2: Sats >87% on 2L via NC. Sever SOB with activity.   Output: Voiding without difficulty.   Last BM: LBM 1/9   Activity: Up with A X1, uses 4 wheel walker.    Skin: Scattered bruises, scabs and abrasion.   Pain: Pain. Declined offered pain medication. Pillow support provided   Neuro: A&O X4. Denies N/T    Dressing: None.    Diet: Tolerating regular diet.    LDA: PIV SL between abx into R forearm.    Equipment: IV pole.    Plan: TBD. Will continue to monitor.    Additional Info:

## 2025-01-10 NOTE — PLAN OF CARE
Goal Outcome Evaluation:      Plan of Care Reviewed With: patient    Overall Patient Progress: improvingOverall Patient Progress: improving    Outcome Evaluation: Doing much better this AM. Continue with IV lasix and IV steriods. Uses the call light appropriately and make needs known. Continue to monitor.      VS: /84   Pulse 68   Temp 98.5  F (36.9  C)   Resp 16   Wt 71.6 kg (157 lb 14.4 oz)   SpO2 92%   BMI 23.66 kg/m     O2: SpO2>92% on 2L via NC. Severe SOB with exertion.    Output: Voiding without difficulty.   Last BM: 1/8/25 per pt   Activity: A1 with 4 wheel walker.   Skin: Scattered bruises, scabs, and abrasions   Pain: Managed with scheduled morphine   CMS: A&O x 4. Denies N/T   Dressing: None   Diet: Regular. Mildly thick liquids (Lvl 2)   LDA: R PIV SL between abx   Equipment: IV pole, personal belongings, and call light   Plan: TBD. Continue to monitor.   Additional Info:

## 2025-01-10 NOTE — PLAN OF CARE
Goal Outcome Evaluation:      Plan of Care Reviewed With: patient    Overall Patient Progress: no changeOverall Patient Progress: no change      VS: /70   Pulse 89   Temp 95.7  F (35.4  C) (Oral)   Resp 16   Wt 71.6 kg (157 lb 14.4 oz)   SpO2 (!) 80%   BMI 23.66 kg/m       O2: Sats >87% on 2L via NC. Sever SOB with activity.   Output: Voiding without difficulty.   Last BM: LBM 1/9   Activity: Up with A X1, uses 4 wheel walker.    Skin: Scattered bruises, scabs and abrasion.   Pain: Pain managed with scheduled Morphine.    Neuro: A&O X4. Denies N/T    Dressing: None.    Diet: Tolerating regular diet.    LDA: PIV SL between abx into R forearm.    Equipment: IV pole.    Plan: TBD. Will continue to monitor.    Additional Info:

## 2025-01-11 LAB
ANION GAP SERPL CALCULATED.3IONS-SCNC: 11 MMOL/L (ref 7–15)
BUN SERPL-MCNC: 25 MG/DL (ref 8–23)
CALCIUM SERPL-MCNC: 9.5 MG/DL (ref 8.8–10.4)
CHLORIDE SERPL-SCNC: 97 MMOL/L (ref 98–107)
CREAT SERPL-MCNC: 0.92 MG/DL (ref 0.67–1.17)
EGFRCR SERPLBLD CKD-EPI 2021: 86 ML/MIN/1.73M2
ERYTHROCYTE [DISTWIDTH] IN BLOOD BY AUTOMATED COUNT: 13.6 % (ref 10–15)
GLUCOSE SERPL-MCNC: 166 MG/DL (ref 70–99)
HCO3 SERPL-SCNC: 32 MMOL/L (ref 22–29)
HCT VFR BLD AUTO: 46.1 % (ref 40–53)
HGB BLD-MCNC: 15.3 G/DL (ref 13.3–17.7)
MCH RBC QN AUTO: 30.7 PG (ref 26.5–33)
MCHC RBC AUTO-ENTMCNC: 33.2 G/DL (ref 31.5–36.5)
MCV RBC AUTO: 92 FL (ref 78–100)
PLATELET # BLD AUTO: 88 10E3/UL (ref 150–450)
POTASSIUM SERPL-SCNC: 3.8 MMOL/L (ref 3.4–5.3)
RBC # BLD AUTO: 4.99 10E6/UL (ref 4.4–5.9)
SODIUM SERPL-SCNC: 140 MMOL/L (ref 135–145)
WBC # BLD AUTO: 12.3 10E3/UL (ref 4–11)

## 2025-01-11 PROCEDURE — 120N000002 HC R&B MED SURG/OB UMMC

## 2025-01-11 PROCEDURE — 85041 AUTOMATED RBC COUNT: CPT | Performed by: INTERNAL MEDICINE

## 2025-01-11 PROCEDURE — 36415 COLL VENOUS BLD VENIPUNCTURE: CPT | Performed by: INTERNAL MEDICINE

## 2025-01-11 PROCEDURE — 94640 AIRWAY INHALATION TREATMENT: CPT | Mod: 76

## 2025-01-11 PROCEDURE — 250N000013 HC RX MED GY IP 250 OP 250 PS 637

## 2025-01-11 PROCEDURE — 80048 BASIC METABOLIC PNL TOTAL CA: CPT | Performed by: INTERNAL MEDICINE

## 2025-01-11 PROCEDURE — 250N000013 HC RX MED GY IP 250 OP 250 PS 637: Performed by: STUDENT IN AN ORGANIZED HEALTH CARE EDUCATION/TRAINING PROGRAM

## 2025-01-11 PROCEDURE — 250N000013 HC RX MED GY IP 250 OP 250 PS 637: Performed by: INTERNAL MEDICINE

## 2025-01-11 PROCEDURE — 999N000157 HC STATISTIC RCP TIME EA 10 MIN

## 2025-01-11 PROCEDURE — 250N000009 HC RX 250

## 2025-01-11 PROCEDURE — 85014 HEMATOCRIT: CPT | Performed by: INTERNAL MEDICINE

## 2025-01-11 PROCEDURE — 250N000011 HC RX IP 250 OP 636: Performed by: INTERNAL MEDICINE

## 2025-01-11 PROCEDURE — 99233 SBSQ HOSP IP/OBS HIGH 50: CPT | Performed by: INTERNAL MEDICINE

## 2025-01-11 PROCEDURE — 250N000013 HC RX MED GY IP 250 OP 250 PS 637: Performed by: CLINICAL NURSE SPECIALIST

## 2025-01-11 PROCEDURE — 999N000127 HC STATISTIC PERIPHERAL IV START W US GUIDANCE

## 2025-01-11 RX ADMIN — GABAPENTIN 400 MG: 300 CAPSULE ORAL at 20:31

## 2025-01-11 RX ADMIN — METHOCARBAMOL 500 MG: 500 TABLET ORAL at 08:00

## 2025-01-11 RX ADMIN — IPRATROPIUM BROMIDE AND ALBUTEROL SULFATE 3 ML: .5; 3 SOLUTION RESPIRATORY (INHALATION) at 09:28

## 2025-01-11 RX ADMIN — METHYLPREDNISOLONE SODIUM SUCCINATE 40 MG: 40 INJECTION, POWDER, FOR SOLUTION INTRAMUSCULAR; INTRAVENOUS at 11:49

## 2025-01-11 RX ADMIN — METHOCARBAMOL 500 MG: 500 TABLET ORAL at 16:46

## 2025-01-11 RX ADMIN — Medication 1 HALF-TAB: at 20:31

## 2025-01-11 RX ADMIN — POTASSIUM CHLORIDE 20 MEQ: 1500 TABLET, EXTENDED RELEASE ORAL at 08:00

## 2025-01-11 RX ADMIN — Medication 1 HALF-TAB: at 08:01

## 2025-01-11 RX ADMIN — PIPERACILLIN AND TAZOBACTAM 4.5 G: 4; .5 INJECTION, POWDER, LYOPHILIZED, FOR SOLUTION INTRAVENOUS at 18:37

## 2025-01-11 RX ADMIN — MORPHINE SULFATE 10 MG: 20 SOLUTION ORAL at 07:56

## 2025-01-11 RX ADMIN — FUROSEMIDE 40 MG: 10 INJECTION, SOLUTION INTRAMUSCULAR; INTRAVENOUS at 08:14

## 2025-01-11 RX ADMIN — METOPROLOL SUCCINATE 50 MG: 50 TABLET, EXTENDED RELEASE ORAL at 08:00

## 2025-01-11 RX ADMIN — IPRATROPIUM BROMIDE AND ALBUTEROL SULFATE 3 ML: .5; 3 SOLUTION RESPIRATORY (INHALATION) at 20:44

## 2025-01-11 RX ADMIN — Medication 1 HALF-TAB: at 14:57

## 2025-01-11 RX ADMIN — GABAPENTIN 400 MG: 300 CAPSULE ORAL at 14:57

## 2025-01-11 RX ADMIN — AMLODIPINE BESYLATE 5 MG: 5 TABLET ORAL at 21:23

## 2025-01-11 RX ADMIN — CARBIDOPA AND LEVODOPA 1 TABLET: 25; 100 TABLET ORAL at 08:00

## 2025-01-11 RX ADMIN — GABAPENTIN 400 MG: 300 CAPSULE ORAL at 07:59

## 2025-01-11 RX ADMIN — METHOCARBAMOL 500 MG: 500 TABLET ORAL at 20:31

## 2025-01-11 RX ADMIN — CARBIDOPA AND LEVODOPA 1 TABLET: 25; 100 TABLET ORAL at 20:32

## 2025-01-11 RX ADMIN — PIPERACILLIN AND TAZOBACTAM 4.5 G: 4; .5 INJECTION, POWDER, LYOPHILIZED, FOR SOLUTION INTRAVENOUS at 00:22

## 2025-01-11 RX ADMIN — LEVOTHYROXINE SODIUM 112 MCG: 112 TABLET ORAL at 08:00

## 2025-01-11 RX ADMIN — CARBIDOPA AND LEVODOPA 1 TABLET: 25; 100 TABLET ORAL at 14:57

## 2025-01-11 RX ADMIN — PIPERACILLIN AND TAZOBACTAM 4.5 G: 4; .5 INJECTION, POWDER, LYOPHILIZED, FOR SOLUTION INTRAVENOUS at 06:21

## 2025-01-11 RX ADMIN — METHYLPREDNISOLONE SODIUM SUCCINATE 40 MG: 40 INJECTION, POWDER, FOR SOLUTION INTRAMUSCULAR; INTRAVENOUS at 20:32

## 2025-01-11 RX ADMIN — MORPHINE SULFATE 10 MG: 20 SOLUTION ORAL at 14:56

## 2025-01-11 RX ADMIN — IPRATROPIUM BROMIDE AND ALBUTEROL SULFATE 3 ML: .5; 3 SOLUTION RESPIRATORY (INHALATION) at 13:06

## 2025-01-11 RX ADMIN — MORPHINE SULFATE 10 MG: 20 SOLUTION ORAL at 20:31

## 2025-01-11 RX ADMIN — METHOCARBAMOL 500 MG: 500 TABLET ORAL at 11:50

## 2025-01-11 RX ADMIN — ROSUVASTATIN 10 MG: 10 TABLET, FILM COATED ORAL at 08:00

## 2025-01-11 RX ADMIN — METHYLPREDNISOLONE SODIUM SUCCINATE 40 MG: 40 INJECTION, POWDER, FOR SOLUTION INTRAMUSCULAR; INTRAVENOUS at 03:45

## 2025-01-11 RX ADMIN — SENNOSIDES AND DOCUSATE SODIUM 2 TABLET: 50; 8.6 TABLET ORAL at 16:51

## 2025-01-11 ASSESSMENT — ACTIVITIES OF DAILY LIVING (ADL)
ADLS_ACUITY_SCORE: 43

## 2025-01-11 NOTE — PROGRESS NOTES
"[facility]    Medicine Progress Note - Hospitalist Service, GOLD TEAM 16    Date of Admission:  1/4/2025    Assessment & Plan   A: Patient is a 78 y/o man who has a past medical history significant for thyroid cancer with lung metastases, Parkinson's disease, neuropathy, glaucoma, osteoporosis, chronic pain, severe COPD, hyperlipidemia, obstructive sleep apnea and coronary artery disease. Patient presented on 04-Jan-2025 with a 7 day history of productive cough and a 1 day history of weakness. CT pulmonary angiogram was negative for pulmonary embolism. Patient appears to have COPD exacerbation but there is concern for community-acquired pneumonia as well. There is also concern that patient was in early sepsis on presentation.      Patient reportedly saw Pulmonology in Oct-2024 and COPD was moderately well-controlled at that time. Activity was apparently limited much more by his balance and parkinsonism than by respiratory status. Patient has antibiotics and prednisone available for outpatient COPD flares but apparently has not recently used this.      Patient apparently just entered hospice in the past week prior to hospital admission. Patient stated on admission that he was too weak and he and his daughter called their hospice agency and 911 was called and he was brought into the hospital. When provider asked on day of presentation whether patient wished for hospitalization and treatment of his potential infection, patient stated that he was seeking treatment and reported that his goal was to \"be able to do things for myself\". It was discussed that hospice typically focused on comfort rather than diagnostic testing and aggressive medical treatment. Patient acknowledged this but seemed unsure of what his true desires for his care are moving forward.      P:    1/11   Diet down graded to mildly thick liquids  Continue IV lasix and IV steroids  Zosyn started 1/10/24.  Am " labs pending   Continue to get short of breath with minimal exertion                   # Possible community-acquired pneumonia on admission .treated with CTX  HAP . On zosyn 3/5 days. Pro calcitonin 0.12       # COPD exacerbation:  IV steroids      #Chronic hypoxemic respiratory failure:  - Patient usually on 1-2 litres per minute of supplemental oxygen at baseline.  Per daughter BIPAP ok to do.       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  -     # Right upper lobe lung mass s/p radiation therapy with possible radiation pneumonitis:  - Patient had previous VATS biopsy in Feb-2022 that was non-diagnostic.     #Post-surgical hypothyroidism:  - Patient on levothyroxine 112 mcg daily.     # Rheumatoid arthritis with positive rheumatoid factor:  - Per chart review, patient had once been on methotrexate and prednisone but had stopped these medications a few years ago.  Out patient follow up      #Coronary artery disease with past MI s/p stent placement:  - Patient usually on aspirin and crestor but these are currently on hold.   -continue metoprolol succinate 50 mg daily      #Hypertension:    CTM       # Hyperlipidemia:  - Patient usually on crestor      #Parkinson's disease:  - Patient on sinemet three times a day.     # Neuropathy, chronic pain syndrome:  - Patient currently on gabapentin 400 mg three times a day.  - Patient usually on methocarbamol but this is currently on hold.     # Glaucoma:  - Patient not currently on medication for this.  - Patient to f/u with Ophthalmology as outpatient if further treatment is desired.     # Osteoporosis:  - Patient on prolia as outpatient.    # History of subarachnoid hemorrhage in 2023:  - No active intervention indicated.     # Acute thrombocytopenia:  CTM              Diet: Regular Diet Adult Mildly Thick (level 2)    DVT Prophylaxis: Pneumatic Compression Devices  Mir Catheter: Not present  Lines:  None     Cardiac Monitoring: None  Code Status: No CPR- Do NOT Intubate      Clinically Significant Risk Factors                 # Thrombocytopenia: Lowest platelets = 42 in last 2 days, will monitor for bleeding   # Hypertension: Noted on problem list     # Acute Hypoxic Respiratory Failure: Documented O2 saturation < 90%. Continue supplemental oxygen as needed             # Financial/Environmental Concerns:           Social Drivers of Health    Tobacco Use: Medium Risk (10/18/2024)    Patient History     Smoking Tobacco Use: Former     Smokeless Tobacco Use: Never   Physical Activity: Inactive (10/14/2024)    Exercise Vital Sign     Days of Exercise per Week: 0 days     Minutes of Exercise per Session: 0 min   Stress: Stress Concern Present (10/14/2024)    [place] [employer] - Occupational Stress Questionnaire     Feeling of Stress : Very much   Social Connections: Unknown (10/14/2024)    Social Connection and Isolation Panel [NHANES]     Frequency of Social Gatherings with Friends and Family: More than three times a week          Disposition Plan     Medically Ready for Discharge: Anticipated in 5+ Days             Samantha Niño MD  Hospitalist Service, GOLD TEAM [address] [place] [address]  Securely message with [employer] (more info)  Text page via [employer] Paging/Directory   See signed in provider for up to date coverage information  ______________________________________________________________________    Interval History   Remains SOB  Less fatigued   No new concerns per nursing   Cleaned up but could not take shower as was not feeling well     Physical Exam   Vital Signs: Temp: 98.3  F (36.8  C) Temp src: Oral BP: (!) 149/79 Pulse: 87   Resp: 16 SpO2: 92 % O2 Device: Nasal cannula Oxygen Delivery: 2 LPM  Weight: 157 lbs 14.4 oz         Alert and oriented .  Chest ; Breath sounds are diminisshed, coarse ,   Cardiovascular Exam ; S1 & S2 .  GI ; Abdomen is  soft and non tender. BS positive .  Skin ; no jaundice noted.  Psych ; Silvia mood & affect.    Medical Decision Making       51 MINUTES SPENT BY ME on the date of service doing chart review, history, exam, documentation & further activities per the note.      Data

## 2025-01-11 NOTE — PLAN OF CARE
Goal Outcome Evaluation:      Plan of Care Reviewed With: patient    Overall Patient Progress: no changeOverall Patient Progress: no change    VS: BP (!) 149/79 (BP Location: Right arm)   Pulse 87   Temp 98.3  F (36.8  C) (Oral)   Resp 16   Wt 71.6 kg (157 lb 14.4 oz)   SpO2 (!) 86%   BMI 23.66 kg/m       O2: Sats >87% on 2L via NC. Sever SOB with activity.   Output: Voiding without difficulty.   Last BM: LBM 1/8   Activity: Up with A X1, uses 4 wheel walker.    Skin: Scattered bruises, scabs and abrasion.   Pain: Pain managed with scheduled Morphine.    Neuro: A&O X4. Denies N/T    Dressing: None.    Diet: Tolerating regular diet.    LDA: PIV SL between abx into R forearm.    Equipment: IV pole.    Plan: TBD. Will continue to monitor.    Additional Info:

## 2025-01-11 NOTE — CONSULTS
"Consult received for Vascular access care.  See LDA for details. For additional needs place \"Nursing to Consult for Vascular Access\" [id number] order in EPIC.  "

## 2025-01-11 NOTE — PLAN OF CARE
VS: BP (!) 160/91 (BP Location: Right arm)   Pulse 83   Temp 98.3  F (36.8  C) (Oral)   Resp 16   Wt 73.1 kg (161 lb 1.6 oz)   SpO2 91%   BMI 24.14 kg/m     O2: On 2 LPM via nasal cannula  Some SOB at rest and with activity   Output: Continent with both bowel and bladder   Last BM: 1-8-2024   Activity: Ax1 with 4WW   Skin: Scattered bruising and scabbing on BUE   Pain: On scheduled morphine   CMS: A&Ox4, with baseline numbness and tingling on BLE   Dressing: None   Diet: Regular diet, mildy thickened liquid level 2   LDA: PIV leaking, requested vascular access to reinser IV   Equipment: Personal items, call light and IV pole   Plan: Pending, to continue POC   Additional Info:

## 2025-01-11 NOTE — PROGRESS NOTES
VS: Temp: 98.3  F (36.8  C) Temp src: Oral BP: (!) 149/79 Pulse: 87   Resp: 16 SpO2: (!) 86 % O2 Device: Nasal cannula Oxygen Delivery: 2 LPM     O2: Supplemental O2, SOB, exertion with activity   Output: Voids spontaneously in bathroom   Last BM: 1/8   Activity: Up with SBA of 1, 2 wheeled walker, stable gait, generalized weakness   Skin: Intact, multiple healed bruises   Pain: Denies   CMS: Alert and oriented /4, able to verbalize needs appropriately   Dressing: N/A   Diet: REG   LDA: PIV to back of right hand, SL   Equipment: Walker, IV pump/pole   Plan: TBD   Additional Info:

## 2025-01-12 ENCOUNTER — APPOINTMENT (OUTPATIENT)
Dept: SPEECH THERAPY | Facility: CLINIC | Age: 80
End: 2025-01-12
Payer: MEDICARE

## 2025-01-12 LAB
ANION GAP SERPL CALCULATED.3IONS-SCNC: 14 MMOL/L (ref 7–15)
BUN SERPL-MCNC: 29.8 MG/DL (ref 8–23)
CALCIUM SERPL-MCNC: 9.6 MG/DL (ref 8.8–10.4)
CHLORIDE SERPL-SCNC: 102 MMOL/L (ref 98–107)
CREAT SERPL-MCNC: 0.9 MG/DL (ref 0.67–1.17)
EGFRCR SERPLBLD CKD-EPI 2021: 88 ML/MIN/1.73M2
ERYTHROCYTE [DISTWIDTH] IN BLOOD BY AUTOMATED COUNT: 13.4 % (ref 10–15)
GLUCOSE SERPL-MCNC: 181 MG/DL (ref 70–99)
HCO3 SERPL-SCNC: 28 MMOL/L (ref 22–29)
HCT VFR BLD AUTO: 45.2 % (ref 40–53)
HGB BLD-MCNC: 14.9 G/DL (ref 13.3–17.7)
MCH RBC QN AUTO: 30.8 PG (ref 26.5–33)
MCHC RBC AUTO-ENTMCNC: 33 G/DL (ref 31.5–36.5)
MCV RBC AUTO: 93 FL (ref 78–100)
PLATELET # BLD AUTO: 99 10E3/UL (ref 150–450)
POTASSIUM SERPL-SCNC: 4.2 MMOL/L (ref 3.4–5.3)
RBC # BLD AUTO: 4.84 10E6/UL (ref 4.4–5.9)
SODIUM SERPL-SCNC: 144 MMOL/L (ref 135–145)
WBC # BLD AUTO: 11.8 10E3/UL (ref 4–11)

## 2025-01-12 PROCEDURE — 999N000157 HC STATISTIC RCP TIME EA 10 MIN

## 2025-01-12 PROCEDURE — 92526 ORAL FUNCTION THERAPY: CPT | Mod: GN

## 2025-01-12 PROCEDURE — 94640 AIRWAY INHALATION TREATMENT: CPT | Mod: 76

## 2025-01-12 PROCEDURE — 120N000002 HC R&B MED SURG/OB UMMC

## 2025-01-12 PROCEDURE — 250N000009 HC RX 250: Performed by: INTERNAL MEDICINE

## 2025-01-12 PROCEDURE — 250N000013 HC RX MED GY IP 250 OP 250 PS 637

## 2025-01-12 PROCEDURE — 250N000009 HC RX 250

## 2025-01-12 PROCEDURE — 250N000013 HC RX MED GY IP 250 OP 250 PS 637: Performed by: CLINICAL NURSE SPECIALIST

## 2025-01-12 PROCEDURE — 85018 HEMOGLOBIN: CPT | Performed by: INTERNAL MEDICINE

## 2025-01-12 PROCEDURE — 94640 AIRWAY INHALATION TREATMENT: CPT

## 2025-01-12 PROCEDURE — 250N000013 HC RX MED GY IP 250 OP 250 PS 637: Performed by: INTERNAL MEDICINE

## 2025-01-12 PROCEDURE — 99233 SBSQ HOSP IP/OBS HIGH 50: CPT | Performed by: INTERNAL MEDICINE

## 2025-01-12 PROCEDURE — 36415 COLL VENOUS BLD VENIPUNCTURE: CPT | Performed by: INTERNAL MEDICINE

## 2025-01-12 PROCEDURE — 82374 ASSAY BLOOD CARBON DIOXIDE: CPT | Performed by: INTERNAL MEDICINE

## 2025-01-12 PROCEDURE — 250N000011 HC RX IP 250 OP 636: Performed by: INTERNAL MEDICINE

## 2025-01-12 PROCEDURE — 250N000013 HC RX MED GY IP 250 OP 250 PS 637: Performed by: STUDENT IN AN ORGANIZED HEALTH CARE EDUCATION/TRAINING PROGRAM

## 2025-01-12 PROCEDURE — 80048 BASIC METABOLIC PNL TOTAL CA: CPT | Performed by: INTERNAL MEDICINE

## 2025-01-12 RX ADMIN — METHOCARBAMOL 500 MG: 500 TABLET ORAL at 20:02

## 2025-01-12 RX ADMIN — GABAPENTIN 400 MG: 300 CAPSULE ORAL at 08:29

## 2025-01-12 RX ADMIN — PIPERACILLIN AND TAZOBACTAM 4.5 G: 4; .5 INJECTION, POWDER, LYOPHILIZED, FOR SOLUTION INTRAVENOUS at 00:15

## 2025-01-12 RX ADMIN — Medication 1 HALF-TAB: at 14:29

## 2025-01-12 RX ADMIN — Medication 1 HALF-TAB: at 08:29

## 2025-01-12 RX ADMIN — POTASSIUM CHLORIDE 20 MEQ: 1500 TABLET, EXTENDED RELEASE ORAL at 08:29

## 2025-01-12 RX ADMIN — PIPERACILLIN AND TAZOBACTAM 4.5 G: 4; .5 INJECTION, POWDER, LYOPHILIZED, FOR SOLUTION INTRAVENOUS at 06:16

## 2025-01-12 RX ADMIN — METHYLPREDNISOLONE SODIUM SUCCINATE 40 MG: 40 INJECTION, POWDER, FOR SOLUTION INTRAMUSCULAR; INTRAVENOUS at 20:01

## 2025-01-12 RX ADMIN — METHYLPREDNISOLONE SODIUM SUCCINATE 40 MG: 40 INJECTION, POWDER, FOR SOLUTION INTRAMUSCULAR; INTRAVENOUS at 12:03

## 2025-01-12 RX ADMIN — GABAPENTIN 400 MG: 300 CAPSULE ORAL at 20:01

## 2025-01-12 RX ADMIN — METOPROLOL SUCCINATE 50 MG: 50 TABLET, EXTENDED RELEASE ORAL at 08:29

## 2025-01-12 RX ADMIN — PIPERACILLIN AND TAZOBACTAM 4.5 G: 4; .5 INJECTION, POWDER, LYOPHILIZED, FOR SOLUTION INTRAVENOUS at 12:02

## 2025-01-12 RX ADMIN — GABAPENTIN 400 MG: 300 CAPSULE ORAL at 14:28

## 2025-01-12 RX ADMIN — METHOCARBAMOL 500 MG: 500 TABLET ORAL at 08:29

## 2025-01-12 RX ADMIN — Medication 1 HALF-TAB: at 20:02

## 2025-01-12 RX ADMIN — CARBIDOPA AND LEVODOPA 1 TABLET: 25; 100 TABLET ORAL at 14:29

## 2025-01-12 RX ADMIN — MORPHINE SULFATE 10 MG: 20 SOLUTION ORAL at 14:27

## 2025-01-12 RX ADMIN — ROSUVASTATIN 10 MG: 10 TABLET, FILM COATED ORAL at 08:29

## 2025-01-12 RX ADMIN — IPRATROPIUM BROMIDE AND ALBUTEROL SULFATE 3 ML: .5; 3 SOLUTION RESPIRATORY (INHALATION) at 12:43

## 2025-01-12 RX ADMIN — PIPERACILLIN AND TAZOBACTAM 4.5 G: 4; .5 INJECTION, POWDER, LYOPHILIZED, FOR SOLUTION INTRAVENOUS at 18:34

## 2025-01-12 RX ADMIN — CARBIDOPA AND LEVODOPA 1 TABLET: 25; 100 TABLET ORAL at 20:02

## 2025-01-12 RX ADMIN — AMLODIPINE BESYLATE 5 MG: 5 TABLET ORAL at 21:47

## 2025-01-12 RX ADMIN — CARBIDOPA AND LEVODOPA 1 TABLET: 25; 100 TABLET ORAL at 08:29

## 2025-01-12 RX ADMIN — IPRATROPIUM BROMIDE AND ALBUTEROL SULFATE 3 ML: .5; 3 SOLUTION RESPIRATORY (INHALATION) at 23:53

## 2025-01-12 RX ADMIN — MORPHINE SULFATE 10 MG: 20 SOLUTION ORAL at 20:01

## 2025-01-12 RX ADMIN — IPRATROPIUM BROMIDE AND ALBUTEROL SULFATE 3 ML: .5; 3 SOLUTION RESPIRATORY (INHALATION) at 15:56

## 2025-01-12 RX ADMIN — METHOCARBAMOL 500 MG: 500 TABLET ORAL at 16:28

## 2025-01-12 RX ADMIN — METHOCARBAMOL 500 MG: 500 TABLET ORAL at 12:02

## 2025-01-12 RX ADMIN — METHYLPREDNISOLONE SODIUM SUCCINATE 40 MG: 40 INJECTION, POWDER, FOR SOLUTION INTRAMUSCULAR; INTRAVENOUS at 03:58

## 2025-01-12 RX ADMIN — FUROSEMIDE 40 MG: 10 INJECTION, SOLUTION INTRAMUSCULAR; INTRAVENOUS at 08:28

## 2025-01-12 RX ADMIN — LEVOTHYROXINE SODIUM 112 MCG: 112 TABLET ORAL at 08:29

## 2025-01-12 RX ADMIN — MORPHINE SULFATE 10 MG: 20 SOLUTION ORAL at 08:23

## 2025-01-12 RX ADMIN — IPRATROPIUM BROMIDE AND ALBUTEROL SULFATE 3 ML: .5; 3 SOLUTION RESPIRATORY (INHALATION) at 07:48

## 2025-01-12 RX ADMIN — IPRATROPIUM BROMIDE AND ALBUTEROL SULFATE 3 ML: .5; 3 SOLUTION RESPIRATORY (INHALATION) at 20:19

## 2025-01-12 RX ADMIN — IPRATROPIUM BROMIDE AND ALBUTEROL SULFATE 3 ML: .5; 3 SOLUTION RESPIRATORY (INHALATION) at 05:09

## 2025-01-12 ASSESSMENT — ACTIVITIES OF DAILY LIVING (ADL)
ADLS_ACUITY_SCORE: 43

## 2025-01-12 NOTE — PROGRESS NOTES
"[facility]    Medicine Progress Note - Hospitalist Service, GOLD TEAM 16    Date of Admission:  1/4/2025    Assessment & Plan   A: Patient is a 80 y/o man who has a past medical history significant for thyroid cancer with lung metastases, Parkinson's disease, neuropathy, glaucoma, osteoporosis, chronic pain, severe COPD, hyperlipidemia, obstructive sleep apnea and coronary artery disease. Patient presented on 04-Jan-2025 with a 7 day history of productive cough and a 1 day history of weakness. CT pulmonary angiogram was negative for pulmonary embolism. Patient appears to have COPD exacerbation but there is concern for community-acquired pneumonia as well. There is also concern that patient was in early sepsis on presentation.      Patient reportedly saw Pulmonology in Oct-2024 and COPD was moderately well-controlled at that time. Activity was apparently limited much more by his balance and parkinsonism than by respiratory status. Patient has antibiotics and prednisone available for outpatient COPD flares but apparently has not recently used this.      Patient apparently just entered hospice in the past week prior to hospital admission. Patient stated on admission that he was too weak and he and his daughter called their hospice agency and 911 was called and he was brought into the hospital. When provider asked on day of presentation whether patient wished for hospitalization and treatment of his potential infection, patient stated that he was seeking treatment and reported that his goal was to \"be able to do things for myself\". It was discussed that hospice typically focused on comfort rather than diagnostic testing and aggressive medical treatment. Patient acknowledged this but seemed unsure of what his true desires for his care are moving forward.      P:    1/12   Diet down graded to mildly thick liquids  Continue IV lasix and IV steroids  Zosyn started 1/9 /24.  Am " labs cbc and bmp reviewed  Continue to get short of breath with minimal exertion                   # Possible community-acquired pneumonia on admission .treated with CTX  HAP . On zosyn 3/5 days. Pro calcitonin 0.12       # COPD exacerbation:  IV steroids      #Chronic hypoxemic respiratory failure:  - Patient usually on 1-2 litres per minute of supplemental oxygen at baseline.  Per daughter BIPAP ok to do.       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  -     # Right upper lobe lung mass s/p radiation therapy with possible radiation pneumonitis:  - Patient had previous VATS biopsy in Feb-2022 that was non-diagnostic.     #Post-surgical hypothyroidism:  - Patient on levothyroxine 112 mcg daily.     # Rheumatoid arthritis with positive rheumatoid factor:  - Per chart review, patient had once been on methotrexate and prednisone but had stopped these medications a few years ago.  Out patient follow up      #Coronary artery disease with past MI s/p stent placement:  - Patient usually on aspirin and crestor but these are currently on hold.   -continue metoprolol succinate 50 mg daily      #Hypertension:    CTM       # Hyperlipidemia:  - Patient usually on crestor      #Parkinson's disease:  - Patient on sinemet three times a day.     # Neuropathy, chronic pain syndrome:  - Patient currently on gabapentin 400 mg three times a day.  - Patient usually on methocarbamol but this is currently on hold.     # Glaucoma:  - Patient not currently on medication for this.  - Patient to f/u with Ophthalmology as outpatient if further treatment is desired.     # Osteoporosis:  - Patient on prolia as outpatient.    # History of subarachnoid hemorrhage in 2023:  - No active intervention indicated.     # Acute thrombocytopenia:  CTM              Diet: Regular Diet Adult Mildly Thick (level 2)    DVT Prophylaxis: PCD  Mir Catheter: Not present  Lines: None     Cardiac  Monitoring: None  Code Status: No CPR- Do NOT Intubate      Clinically Significant Risk Factors          # Hypochloremia: Lowest Cl = 97 mmol/L in last 2 days, will monitor as appropriate        # Thrombocytopenia: Lowest platelets = 88 in last 2 days, will monitor for bleeding   # Hypertension: Noted on problem list     # Acute Hypoxic Respiratory Failure: Documented O2 saturation < 90%. Continue supplemental oxygen as needed             # Financial/Environmental Concerns:           Social Drivers of Health    Tobacco Use: Medium Risk (10/18/2024)    Patient History     Smoking Tobacco Use: Former     Smokeless Tobacco Use: Never   Physical Activity: Inactive (10/14/2024)    Exercise Vital Sign     Days of Exercise per Week: 0 days     Minutes of Exercise per Session: 0 min   Stress: Stress Concern Present (10/14/2024)    [place] citizen [place] of Occupational Health - Occupational Stress Questionnaire     Feeling of Stress : Very much   Social Connections: Unknown (10/14/2024)    Social Connection and Isolation Panel [NHANES]     Frequency of Social Gatherings with Friends and Family: More than three times a week          Disposition Plan     Medically Ready for Discharge: Anticipated in 5+ Days             Samantha Niño MD  Hospitalist Service, [address]  Securely message with [employer] (more info)  Text page via [employer] Paging/Directory   See signed in provider for up to date coverage information  ______________________________________________________________________    Interval History   {No new symptoms reported per nursing staff .  No chest pain or Shortness of breath reported.  No Fever   No vomiting   No difficulty with voiding   Passing gas .  Tolerating diet     4 system ROS reviewed .     Physical Exam   Vital Signs: Temp: 97.3  F (36.3  C) Temp src: Oral BP: (!) 151/79 Pulse: 65     SpO2: 93 % O2 Device: Nasal cannula Oxygen Delivery: 2  LPM  Weight: 160 lbs 4.8 oz         Alert and oriented .mod distress   Chest ;BS reduced and coarse  Cardiovascular Exam ; S1 & S2 .  GI ; Abdomen is soft and non tender. BS positive .    Medical Decision Making       51 MINUTES SPENT BY ME on the date of service doing chart review, history, exam, documentation & further activities per the note.      Data     I have personally reviewed the following data over the past 24 hrs:    11.8 (H)  \   14.9   / 99 (L)     144 102 29.8 (H) /  181 (H)   4.2 28 0.90 \

## 2025-01-12 NOTE — PROGRESS NOTES
Patient has sustained a 2.5X4cm skin tear to the inner side of his right arm, he states it happened as he removed some of the IV tape. Skin is noted to be fragile. Mepilex applied, patient is comfortable

## 2025-01-12 NOTE — PLAN OF CARE
Goal Outcome Evaluation:      Plan of Care Reviewed With: patient    Overall Patient Progress: no changeOverall Patient Progress: no change    VS: BP (!) 163/80   Pulse 83   Temp 98.3  F (36.8  C) (Oral)   Resp 16   Wt 73.1 kg (161 lb 1.6 oz)   SpO2 91%   BMI 24.14 kg/m       O2: Sats >87% on 2L via NC. Sever SOB with activity.   Output: Voiding without difficulty.   Last BM: LBM 1/8, given prn senna.    Activity: Up with A X1, uses 4 wheel walker.    Skin: Scattered bruises, scabs and abrasion.   Pain: Pain managed with scheduled Morphine.    Neuro: A&O X4. Baseline numbness to BLE's   Dressing: None.    Diet: Tolerating regular diet.    LDA: PIV SL between abx into R forearm.    Equipment: IV pole.    Plan: TBD. Will continue to monitor.    Additional Info:

## 2025-01-12 NOTE — PROGRESS NOTES
VS: Temp: 97.5  F (36.4  C) Temp src: Oral BP: (!) 163/86 Pulse: 72     SpO2: 92 % O2 Device: Nasal cannula Oxygen Delivery: 2 LPM   O2: Supplemental O2, SOB, exertion with activity, utilizes nebulizers for SOB   Output: Voids spontaneously in bathroom   Last BM: 1/8   Activity: Up with SBA of 1, 2 wheeled walker, stable gait, generalized weakness. Patient becomes winded with ambulation   Skin: Ziyad appearance, skin tear to right forearm, multiple healed bruises   Pain: Denies   CMS: Alert and oriented /4, able to verbalize needs appropriately, calls appropriately   Dressing: Intact   Diet: REG   LDA: PIV right forearm, SL   Equipment: Walker, IV pump/pole   Plan: TBD   Additional Info:

## 2025-01-12 NOTE — PLAN OF CARE
VS: BP (!) 162/76 (BP Location: Left arm, Patient Position: Fowlers, Cuff Size: Adult Regular)   Pulse 76   Temp 97.6  F (36.4  C) (Oral)   Resp 14   Wt 72.7 kg (160 lb 4.8 oz)   SpO2 96%   BMI 24.02 kg/m     O2: 2 LPM via nasal cannula  Some SOB at rest and with activity    Output: Continent with bowel and bladder   Last BM: 01-   Activity: Ax1 with 4WW   Skin: Scattered bruising and scabbing on BUE   Abrasion on R forearm   Pain: With back pain, on scheduled morphine   CMS: A&Ox4, with baseline numbness and tingling on BUE and BLE   Dressing: L forearm CDI   Diet: Regular diet, denies nausea and vomiting   LDA: PIV on R forearm saline locked   Equipment: Personal items, call light and IV pole   Plan: Pending, continue POC   Additional Info:

## 2025-01-13 LAB
ANION GAP SERPL CALCULATED.3IONS-SCNC: 12 MMOL/L (ref 7–15)
BUN SERPL-MCNC: 32.7 MG/DL (ref 8–23)
CALCIUM SERPL-MCNC: 9.5 MG/DL (ref 8.8–10.4)
CHLORIDE SERPL-SCNC: 104 MMOL/L (ref 98–107)
CREAT SERPL-MCNC: 0.89 MG/DL (ref 0.67–1.17)
EGFRCR SERPLBLD CKD-EPI 2021: 88 ML/MIN/1.73M2
ERYTHROCYTE [DISTWIDTH] IN BLOOD BY AUTOMATED COUNT: 13.4 % (ref 10–15)
GLUCOSE SERPL-MCNC: 106 MG/DL (ref 70–99)
HCO3 SERPL-SCNC: 32 MMOL/L (ref 22–29)
HCT VFR BLD AUTO: 45.4 % (ref 40–53)
HGB BLD-MCNC: 15.3 G/DL (ref 13.3–17.7)
MCH RBC QN AUTO: 31.6 PG (ref 26.5–33)
MCHC RBC AUTO-ENTMCNC: 33.7 G/DL (ref 31.5–36.5)
MCV RBC AUTO: 94 FL (ref 78–100)
PLATELET # BLD AUTO: 104 10E3/UL (ref 150–450)
POTASSIUM SERPL-SCNC: 4.1 MMOL/L (ref 3.4–5.3)
RBC # BLD AUTO: 4.84 10E6/UL (ref 4.4–5.9)
SODIUM SERPL-SCNC: 148 MMOL/L (ref 135–145)
WBC # BLD AUTO: 12.8 10E3/UL (ref 4–11)

## 2025-01-13 PROCEDURE — 250N000013 HC RX MED GY IP 250 OP 250 PS 637

## 2025-01-13 PROCEDURE — 36415 COLL VENOUS BLD VENIPUNCTURE: CPT | Performed by: INTERNAL MEDICINE

## 2025-01-13 PROCEDURE — 999N000157 HC STATISTIC RCP TIME EA 10 MIN

## 2025-01-13 PROCEDURE — 85014 HEMATOCRIT: CPT | Performed by: INTERNAL MEDICINE

## 2025-01-13 PROCEDURE — 250N000011 HC RX IP 250 OP 636: Performed by: INTERNAL MEDICINE

## 2025-01-13 PROCEDURE — 82435 ASSAY OF BLOOD CHLORIDE: CPT | Performed by: INTERNAL MEDICINE

## 2025-01-13 PROCEDURE — 99233 SBSQ HOSP IP/OBS HIGH 50: CPT | Performed by: INTERNAL MEDICINE

## 2025-01-13 PROCEDURE — 120N000002 HC R&B MED SURG/OB UMMC

## 2025-01-13 PROCEDURE — 94640 AIRWAY INHALATION TREATMENT: CPT | Mod: 76

## 2025-01-13 PROCEDURE — 250N000013 HC RX MED GY IP 250 OP 250 PS 637: Performed by: STUDENT IN AN ORGANIZED HEALTH CARE EDUCATION/TRAINING PROGRAM

## 2025-01-13 PROCEDURE — 250N000013 HC RX MED GY IP 250 OP 250 PS 637: Performed by: CLINICAL NURSE SPECIALIST

## 2025-01-13 PROCEDURE — 250N000012 HC RX MED GY IP 250 OP 636 PS 637: Performed by: INTERNAL MEDICINE

## 2025-01-13 PROCEDURE — 250N000013 HC RX MED GY IP 250 OP 250 PS 637: Performed by: INTERNAL MEDICINE

## 2025-01-13 PROCEDURE — 85048 AUTOMATED LEUKOCYTE COUNT: CPT | Performed by: INTERNAL MEDICINE

## 2025-01-13 PROCEDURE — 250N000009 HC RX 250

## 2025-01-13 RX ORDER — AZITHROMYCIN 250 MG/1
500 TABLET, FILM COATED ORAL EVERY OTHER DAY
Status: DISCONTINUED | OUTPATIENT
Start: 2025-01-14 | End: 2025-02-18 | Stop reason: HOSPADM

## 2025-01-13 RX ORDER — FUROSEMIDE 20 MG/1
40 TABLET ORAL DAILY
Status: DISCONTINUED | OUTPATIENT
Start: 2025-01-14 | End: 2025-01-21

## 2025-01-13 RX ORDER — PIPERACILLIN SODIUM, TAZOBACTAM SODIUM 4; .5 G/20ML; G/20ML
4.5 INJECTION, POWDER, LYOPHILIZED, FOR SOLUTION INTRAVENOUS EVERY 6 HOURS
Status: COMPLETED | OUTPATIENT
Start: 2025-01-13 | End: 2025-01-14

## 2025-01-13 RX ORDER — PREDNISONE 20 MG/1
20 TABLET ORAL 2 TIMES DAILY WITH MEALS
Status: COMPLETED | OUTPATIENT
Start: 2025-01-13 | End: 2025-01-17

## 2025-01-13 RX ADMIN — METOPROLOL SUCCINATE 50 MG: 50 TABLET, EXTENDED RELEASE ORAL at 08:02

## 2025-01-13 RX ADMIN — GABAPENTIN 400 MG: 300 CAPSULE ORAL at 20:25

## 2025-01-13 RX ADMIN — MORPHINE SULFATE 10 MG: 20 SOLUTION ORAL at 00:27

## 2025-01-13 RX ADMIN — ROSUVASTATIN 10 MG: 10 TABLET, FILM COATED ORAL at 08:02

## 2025-01-13 RX ADMIN — AMLODIPINE BESYLATE 5 MG: 5 TABLET ORAL at 22:09

## 2025-01-13 RX ADMIN — LEVOTHYROXINE SODIUM 112 MCG: 112 TABLET ORAL at 08:02

## 2025-01-13 RX ADMIN — PREDNISONE 20 MG: 20 TABLET ORAL at 09:27

## 2025-01-13 RX ADMIN — CARBIDOPA AND LEVODOPA 1 TABLET: 25; 100 TABLET ORAL at 20:25

## 2025-01-13 RX ADMIN — IPRATROPIUM BROMIDE AND ALBUTEROL SULFATE 3 ML: .5; 3 SOLUTION RESPIRATORY (INHALATION) at 15:55

## 2025-01-13 RX ADMIN — Medication 1 HALF-TAB: at 15:35

## 2025-01-13 RX ADMIN — PIPERACILLIN AND TAZOBACTAM 4.5 G: 4; .5 INJECTION, POWDER, LYOPHILIZED, FOR SOLUTION INTRAVENOUS at 12:31

## 2025-01-13 RX ADMIN — MORPHINE SULFATE 10 MG: 20 SOLUTION ORAL at 20:25

## 2025-01-13 RX ADMIN — METHYLPREDNISOLONE SODIUM SUCCINATE 40 MG: 40 INJECTION, POWDER, FOR SOLUTION INTRAMUSCULAR; INTRAVENOUS at 03:36

## 2025-01-13 RX ADMIN — METHOCARBAMOL 500 MG: 500 TABLET ORAL at 12:31

## 2025-01-13 RX ADMIN — Medication 1 HALF-TAB: at 20:25

## 2025-01-13 RX ADMIN — IPRATROPIUM BROMIDE AND ALBUTEROL SULFATE 3 ML: .5; 3 SOLUTION RESPIRATORY (INHALATION) at 07:03

## 2025-01-13 RX ADMIN — IPRATROPIUM BROMIDE AND ALBUTEROL SULFATE 3 ML: .5; 3 SOLUTION RESPIRATORY (INHALATION) at 12:50

## 2025-01-13 RX ADMIN — Medication 1 HALF-TAB: at 08:02

## 2025-01-13 RX ADMIN — MORPHINE SULFATE 10 MG: 20 SOLUTION ORAL at 10:26

## 2025-01-13 RX ADMIN — IPRATROPIUM BROMIDE AND ALBUTEROL SULFATE 3 ML: .5; 3 SOLUTION RESPIRATORY (INHALATION) at 21:06

## 2025-01-13 RX ADMIN — Medication 0.25 MG: at 09:27

## 2025-01-13 RX ADMIN — GABAPENTIN 400 MG: 300 CAPSULE ORAL at 08:02

## 2025-01-13 RX ADMIN — POTASSIUM CHLORIDE 20 MEQ: 1500 TABLET, EXTENDED RELEASE ORAL at 08:02

## 2025-01-13 RX ADMIN — PREDNISONE 20 MG: 20 TABLET ORAL at 18:34

## 2025-01-13 RX ADMIN — GABAPENTIN 400 MG: 300 CAPSULE ORAL at 15:35

## 2025-01-13 RX ADMIN — MORPHINE SULFATE 10 MG: 20 SOLUTION ORAL at 15:35

## 2025-01-13 RX ADMIN — PIPERACILLIN AND TAZOBACTAM 4.5 G: 4; .5 INJECTION, POWDER, LYOPHILIZED, FOR SOLUTION INTRAVENOUS at 06:29

## 2025-01-13 RX ADMIN — PIPERACILLIN AND TAZOBACTAM 4.5 G: 4; .5 INJECTION, POWDER, LYOPHILIZED, FOR SOLUTION INTRAVENOUS at 00:24

## 2025-01-13 RX ADMIN — METHOCARBAMOL 500 MG: 500 TABLET ORAL at 08:03

## 2025-01-13 RX ADMIN — CARBIDOPA AND LEVODOPA 1 TABLET: 25; 100 TABLET ORAL at 08:02

## 2025-01-13 RX ADMIN — MORPHINE SULFATE 10 MG: 20 SOLUTION ORAL at 08:03

## 2025-01-13 RX ADMIN — METHOCARBAMOL 500 MG: 500 TABLET ORAL at 20:25

## 2025-01-13 RX ADMIN — CARBIDOPA AND LEVODOPA 1 TABLET: 25; 100 TABLET ORAL at 15:35

## 2025-01-13 RX ADMIN — PIPERACILLIN AND TAZOBACTAM 4.5 G: 4; .5 INJECTION, POWDER, LYOPHILIZED, FOR SOLUTION INTRAVENOUS at 18:34

## 2025-01-13 RX ADMIN — FUROSEMIDE 40 MG: 10 INJECTION, SOLUTION INTRAMUSCULAR; INTRAVENOUS at 08:03

## 2025-01-13 ASSESSMENT — ACTIVITIES OF DAILY LIVING (ADL)
ADLS_ACUITY_SCORE: 43

## 2025-01-13 NOTE — PLAN OF CARE
Goal Outcome Evaluation:      Plan of Care Reviewed With: patient    Overall Patient Progress: no changeOverall Patient Progress: no change    VS: BP (!) 168/80   Pulse 75   Temp 97.7  F (36.5  C) (Oral)   Resp 16   Wt 72.7 kg (160 lb 4.8 oz)   SpO2 94%   BMI 24.02 kg/m     O2: Sats >90% on 2L via NC. Sever SOB with activity. On scheduled neb.   Output: Voiding without difficulty.   Last BM: LBM 1/12, given prn senna.    Activity: Up with A X1, uses 4 wheel walker.    Skin: Scattered bruises, scabs and abrasion.   Pain: Pain managed with scheduled Morphine.    Neuro: A&O X4. Baseline numbness to BLE's   Dressing: None.    Diet: Tolerating regular diet.    LDA: PIV SL between abx into R forearm.    Equipment: IV pole.    Plan: TBD. Will continue to monitor.    Additional Info:

## 2025-01-13 NOTE — PROGRESS NOTES
Care Management Follow Up    Length of Stay (days): 9    Expected Discharge Date: 01/16/25     Concerns to be Addressed: discharge planning     Patient plan of care discussed at interdisciplinary rounds: Yes    Anticipated Discharge Disposition: Home, Hospice      Anticipated Discharge Services: Other (see comment) (current on home hospice)  Anticipated Discharge DME: None    Patient/family educated on Medicare website which has current facility and service quality ratings:    Education Provided on the Discharge Plan: Yes  Patient/Family in Agreement with the Plan:      Referrals Placed by CM/SW: Homecare (currently on home hospice)  Private pay costs discussed: Not applicable    Discussed  Partnership in Safe Discharge Planning  document with patient/family: Yes:     Handoff Completed: No, handoff not indicated or clinically appropriate    Additional Information:  SW  spoke with financial counseling. Financial counseling plans to speak with Pt's daughter this afternoon and also with Pt if Pt answers either his cell phone or room phone.  Per chart it appears Pt continue to struggle with accepting his dx is terminal making discussions around hospice discharge complex. SW/RNCC will continue to follow and offer support around discharge planning.     Next Steps:   SW/RNCC to follow for discharge planning.     Antonio Houston, [facility]  [facility] [place] [facility]   Ph: [phone]

## 2025-01-13 NOTE — PROGRESS NOTES
"CLINICAL NUTRITION SERVICES - ASSESSMENT NOTE     Nutrition Prescription    RECOMMENDATIONS FOR MDs/PROVIDERS TO ORDER:  None    Malnutrition Status:    Moderate malnutrition in the context of acute vs chronic illness    Recommendations already ordered by Registered Dietitian (RD):  Ensure Enlive with all meals+ PRN    Future/Additional Recommendations:  Monitor labs, intakes, and weight trends.     REASON FOR ASSESSMENT  Harrison Thomas is a/an 79 year old male assessed by the dietitian for LOS    NUTRITION/MEDICAL HISTORY  History of thyroid cancer with lung metastases, Parkinson's disease, neuropathy, glaucoma, osteoporosis, chronic pain, severe COPD, hyperlipidemia, obstructive sleep apnea and coronary artery disease. Patient presented on 04-Jan-2025 with a 7 day history of productive cough and a 1 day history of weakness. CT pulmonary angiogram was negative for pulmonary embolism. Patient appears to have COPD exacerbation but there is concern for community-acquired pneumonia as well. There is also concern that patient was in early sepsis on presentation.     Patient apparently just entered hospice in the past week prior to hospital admission. Patient stated on admission that he was too weak and he and his daughter called their hospice agency and 911 was called and he was brought into the hospital. When provider asked on day of presentation whether patient wished for hospitalization and treatment of his potential infection, patient stated that he was seeking treatment and reported that his goal was to \"be able to do things for myself\". It was discussed that hospice typically focused on comfort rather than diagnostic testing and aggressive medical treatment. Patient acknowledged this but seemed unsure of what his true desires for his care are moving forward.     FINDINGS  RD met with pt and visitor in room. RN also in room during visit. Pt reports he is eating okay, visitor notes it is less than normal. Pt had " "Ensure Plus on table which visitor states she has been bringing in. Offered Ensure Enlive to add more protein. Pt agreeable, stating he would like at least two daily. Pt reports feeling weaker in his arms and legs. Denies all GI symptoms. Notes some difficulty swallowing.     CURRENT NUTRITION ORDERS  Diet: Regular with mildly thickened liquids     Intake: % of documented meals. Poorly documented over last week    Pt ordering (on average) 3500 kcal and 107 g protein per day per [employer]. Supplements add at least 350 kcal and 13 g protein daily.  Given poor documentation of intakes, difficult to assess true intakes.     LABS   01/10/25 05:09 01/11/25 12:26 01/12/25 06:29 01/13/25 07:03   Sodium 141 140 144 148 (H)   Potassium 4.6 3.8 4.2 4.1   Urea Nitrogen 22.4 25.0 (H) 29.8 (H) 32.7 (H)   Creatinine 0.91 0.92 0.90 0.89   Glucose 124 (H) 166 (H) 181 (H) 106 (H)     MEDICATIONS  Medications reviewed: Sinemet TID, lasix, levothyroxine, morphine, zosyn, potassium, prednisone    ANTHROPOMETRICS  Height: 174 cm (5' 8.5\")  Most Recent Weight: 72.1 kg (159 lb)    IBW: 64.8 kg (111% IBW)  BMI: Normal BMI  Weight History: Pt with limited weight history prior to admission, weight stable with fluctuations with 12 lb (7%) weight loss over 8 months, with 19 lb (10%) weight loss over 1 year.  Pt reports UBW of 160 recently however previously 25 lb heavier.   01/13/25 72.1 kg (159 lb)   10/24/24 71.5 kg (157 lb 9.6 oz)   10/18/24 70.8 kg (156 lb)   09/23/24 70.9 kg (156 lb 6 oz)   09/20/24 70.8 kg (156 lb)   05/30/24 70.8 kg (156 lb)   05/08/24 77.5 kg (170 lb 12.8 oz)   05/01/24 78.6 kg (173 lb 3.2 oz)   04/29/24 78.7 kg (173 lb 6.4 oz)   04/26/24 75.9 kg (167 lb 6.4 oz)   01/15/24 80.6 kg (177 lb 11.2 oz)     Dosing Weight: 72 kg - most recent weight    ASSESSED NUTRITION NEEDS  Estimated Energy Needs: 9393-4356 kcals/day 25 - 30 kcal/kg  Justification: Maintenance   Estimated Protein Needs: 72-86 grams protein/day (1 - " 1.2 grams of pro/kg)  Justification: Increased needs  Estimated Fluid Needs: 1 ml/kcal or per provider pending fluid status     MALNUTRITION  % Intake:  Difficult to assess, likely less than normal  % Weight Loss: Weight loss does not meet criteria  Subcutaneous Fat Loss: Upper Arm mild  Muscle Loss: Temporal mild and Thoracic region (clavical, acromium bone, deltoid, trapezius, pectoral) mild  Fluid Accumulation/Edema: None noted  Malnutrition Diagnosis: Moderate malnutrition in the context of acute vs chronic illness    NUTRITION DIAGNOSIS  Predicted inadequate protein-energy intake related to variable appetite as evidenced by pt reliant on PO intakes to meet 100% of nutritional needs with potential for variation    INTERVENTIONS  Implementation  Nutrition education for nutrition relationship to health/disease   Medical food supplement therapy     Goals  Patient to consume % of nutritionally adequate meal trays TID, or the equivalent with supplements/snacks.      Monitoring/Evaluation  Progress toward goals will be monitored and evaluated per protocol.    Kristine Zarate MS, RDN, LD  TCU/OB/Ortho Clinical Dietitian  Available via phone and Vocera  Phone: [phone]  Vocera: 5 Ortho Clinical Dietitian Holiday Clinical Dietitian [Multi Site Groups]

## 2025-01-13 NOTE — PROGRESS NOTES
"[facility]    Medicine Progress Note - Hospitalist Service, GOLD TEAM 16    Date of Admission:  1/4/2025    Assessment & Plan   A: Patient is a 80 y/o man who has a past medical history significant for thyroid cancer with lung metastases, Parkinson's disease, neuropathy, glaucoma, osteoporosis, chronic pain, severe COPD, hyperlipidemia, obstructive sleep apnea and coronary artery disease. Patient presented on 04-Jan-2025 with a 7 day history of productive cough and a 1 day history of weakness. CT pulmonary angiogram was negative for pulmonary embolism. Patient appears to have COPD exacerbation but there is concern for community-acquired pneumonia as well. There is also concern that patient was in early sepsis on presentation.      Patient reportedly saw Pulmonology in Oct-2024 and COPD was moderately well-controlled at that time. Activity was apparently limited much more by his balance and parkinsonism than by respiratory status. Patient has antibiotics and prednisone available for outpatient COPD flares but apparently has not recently used this.      Patient apparently just entered hospice in the past week prior to hospital admission. Patient stated on admission that he was too weak and he and his daughter called their hospice agency and 911 was called and he was brought into the hospital. When provider asked on day of presentation whether patient wished for hospitalization and treatment of his potential infection, patient stated that he was seeking treatment and reported that his goal was to \"be able to do things for myself\". It was discussed that hospice typically focused on comfort rather than diagnostic testing and aggressive medical treatment. Patient acknowledged this but seemed unsure of what his true desires for his care are moving forward.      P:    1/13   Discontinue IV lasix  PO  lasix tomorrow if labs stable. Ordered and held   Last day of pip/tazo today "   Change steroid to PO from 1/14  Sodium 148. Wbc 12.8  Add azithromycin 500 mg every other days for COPD exc/inflammation  Holding asa for low platelets ? Resume   Labs in am   Continue to get short of breath with minimal exertion .   [place] palliative input   Spoke with daughter and s/o in room . Discussed that patient is slowly declining . She verbalized that she will not be able to take care of him at home . She was tearful and appreciated the update                  # Possible community-acquired pneumonia on admission .treated with CTX  HAP . On zosyn        # COPD exacerbation:  steroids   Nebs   02 supplement     #Chronic hypoxemic respiratory failure:  - Patient usually on 1-2 litres per minute of supplemental oxygen at baseline.  Per daughter BIPAP ok to do.       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  -     # Right upper lobe lung mass s/p radiation therapy with possible radiation pneumonitis:  - Patient had previous VATS biopsy in Feb-2022 that was non-diagnostic.     #Post-surgical hypothyroidism:  - Patient on levothyroxine 112 mcg daily.     # Rheumatoid arthritis with positive rheumatoid factor:  - Per chart review, patient had once been on methotrexate and prednisone but had stopped these medications a few years ago.  Out patient follow up      #Coronary artery disease with past MI s/p stent placement:  - Patient usually on aspirin and crestor but these are currently on hold.   -continue metoprolol succinate 50 mg daily      #Hypertension:    CTM       # Hyperlipidemia:  - Patient usually on crestor      #Parkinson's disease:  - Patient on sinemet three times a day.     # Neuropathy, chronic pain syndrome:  - Patient currently on gabapentin 400 mg three times a day.  - Patient usually on methocarbamol but this is currently on hold.     # Glaucoma:  - Patient not currently on medication for this.  - Patient to f/u with  Ophthalmology as outpatient if further treatment is desired.     # Osteoporosis:  - Patient on prolia as outpatient.    # History of subarachnoid hemorrhage in 2023:  - No active intervention indicated.     # Acute thrombocytopenia:  CTM    Moderate malnutrition in the context of acute vs chronic illness , see Rd note 1/13          Diet: Regular Diet Adult Mildly Thick (level 2)    DVT Prophylaxis: PCD  Mir Catheter: Not present  Lines: None     Cardiac Monitoring: None  Code Status: No CPR- Do NOT Intubate      Clinically Significant Risk Factors          # Hypochloremia: Lowest Cl = 97 mmol/L in last 2 days, will monitor as appropriate        # Thrombocytopenia: Lowest platelets = 88 in last 2 days, will monitor for bleeding   # Hypertension: Noted on problem list     # Acute Hypoxic Respiratory Failure: Documented O2 saturation < 90%. Continue supplemental oxygen as needed             # Financial/Environmental Concerns:           Social Drivers of Health    Tobacco Use: Medium Risk (10/18/2024)    Patient History     Smoking Tobacco Use: Former     Smokeless Tobacco Use: Never   Physical Activity: Inactive (10/14/2024)    Exercise Vital Sign     Days of Exercise per Week: 0 days     Minutes of Exercise per Session: 0 min   Stress: Stress Concern Present (10/14/2024)    [place] [employer] - Occupational Stress Questionnaire     Feeling of Stress : Very much   Social Connections: Unknown (10/14/2024)    Social Connection and Isolation Panel [NHANES]     Frequency of Social Gatherings with Friends and Family: More than three times a week          Disposition Plan     Medically Ready for Discharge: Anticipated in 2-4 Days             Samantha Niño MD  Hospitalist Service, GOLD TEAM [address] [place] [address]  Securely message with [employer] (more info)  Text page via [employer] Paging/Directory   See signed in provider for up to date coverage  information  ______________________________________________________________________    Interval History   Remains short of breath   No fever   No chills  No new nursing concerns  Eats snacks    Physical Exam   Vital Signs: Temp: 97.8  F (36.6  C) Temp src: Oral BP: (!) 165/78 Pulse: 85   Resp: 22 SpO2: 93 % O2 Device: Nasal cannula Oxygen Delivery: 2 LPM  Weight: 159 lbs 0 oz         Alert and oriented . Mild distress .  Chest ; Breath sounds are reduced BL with rhonchi   Cardiovascular Exam ; S1 & S2 .  GI ; Abdomen is soft and non tender. BS positive .  Skin ; no jaundice noted.  Psych ; Silvia mood & affect.    Medical Decision Making       51 MINUTES SPENT BY ME on the date of service doing chart review, history, exam, documentation & further activities per the note.      Data     I have personally reviewed the following data over the past 24 hrs:    12.8 (H)  \   15.3   / 104 (L)     148 (H) 104 32.7 (H) /  106 (H)   4.1 32 (H) 0.89 \

## 2025-01-13 NOTE — PROGRESS NOTES
VS: Temp: 97.8  F (36.6  C) Temp src: Oral BP: (!) 165/78 Pulse: 85   Resp: 22 SpO2: 93 % O2 Device: Nasal cannula Oxygen Delivery: 2 LPM   O2: Continued congestion, SOB with exertion, infrequent wet cough, denies chest pain   Output: Voids spontaneously in bathroom   Last BM: 1/12   Activity: Up with SBA of 1, walker, stable gait, generalized weakness   Skin: Skin tear to right inner arm, jose j appearance   Pain: 7/10, managed by morphine   CMS: intact   Dressing: N/A   Diet: REG, thin liquids   LDA: PIV to right arm, SL   Equipment: Walker, personal belongings   Plan: TBD   Additional Info: Daily weights

## 2025-01-13 NOTE — PLAN OF CARE
Goal Outcome Evaluation:      Plan of Care Reviewed With: patient    Overall Patient Progress: no changeOverall Patient Progress: no change    Outcome Evaluation: Pt AOx4, pt reported feeling generally unwell this afternoon citing increased anxiety but unable to describe other symptoms or why he was feeling worse. Ox at 92 on 1L O2 via NC. Continued dyspnea on exertion, needed more help transfering back to bed from bathroom d/t generalized weakness. Bed alarms on. Author present with the MD in speaking with the patients daughter regarding the patients condition, plan to continue POC for pain maagment and oxygen monitoring.    Output: Voids spontaneously, denies difficulties   Potentially final day of IV lasix   Last BM: 1/13   Activity: Ax1, w/ walker and GB   Skin: Scattered bruising, scabs to BUE  Pt has sensitive skin, sensitive to adhesives   Pain: Managed w/ scheduled and prn PO morphine    Dressing: Mepilex dressing CDI   Diet: Regular Diet Adult Mildly Thick (level 2)    LDA: R PIV SL in between abx    Plan: Possible return to hospice after discharge

## 2025-01-14 ENCOUNTER — APPOINTMENT (OUTPATIENT)
Dept: GENERAL RADIOLOGY | Facility: CLINIC | Age: 80
End: 2025-01-14
Attending: INTERNAL MEDICINE
Payer: MEDICARE

## 2025-01-14 LAB
ANION GAP SERPL CALCULATED.3IONS-SCNC: 8 MMOL/L (ref 7–15)
BUN SERPL-MCNC: 37.1 MG/DL (ref 8–23)
CALCIUM SERPL-MCNC: 8.8 MG/DL (ref 8.8–10.4)
CHLORIDE SERPL-SCNC: 103 MMOL/L (ref 98–107)
CREAT SERPL-MCNC: 0.94 MG/DL (ref 0.67–1.17)
EGFRCR SERPLBLD CKD-EPI 2021: 83 ML/MIN/1.73M2
ERYTHROCYTE [DISTWIDTH] IN BLOOD BY AUTOMATED COUNT: 13.5 % (ref 10–15)
GLUCOSE SERPL-MCNC: 85 MG/DL (ref 70–99)
HCO3 SERPL-SCNC: 37 MMOL/L (ref 22–29)
HCT VFR BLD AUTO: 45.1 % (ref 40–53)
HGB BLD-MCNC: 14.5 G/DL (ref 13.3–17.7)
MCH RBC QN AUTO: 30.5 PG (ref 26.5–33)
MCHC RBC AUTO-ENTMCNC: 32.2 G/DL (ref 31.5–36.5)
MCV RBC AUTO: 95 FL (ref 78–100)
PLATELET # BLD AUTO: 72 10E3/UL (ref 150–450)
POTASSIUM SERPL-SCNC: 3.8 MMOL/L (ref 3.4–5.3)
RBC # BLD AUTO: 4.76 10E6/UL (ref 4.4–5.9)
SODIUM SERPL-SCNC: 148 MMOL/L (ref 135–145)
WBC # BLD AUTO: 11.8 10E3/UL (ref 4–11)

## 2025-01-14 PROCEDURE — 82435 ASSAY OF BLOOD CHLORIDE: CPT | Performed by: INTERNAL MEDICINE

## 2025-01-14 PROCEDURE — 250N000009 HC RX 250

## 2025-01-14 PROCEDURE — 94640 AIRWAY INHALATION TREATMENT: CPT

## 2025-01-14 PROCEDURE — 71046 X-RAY EXAM CHEST 2 VIEWS: CPT

## 2025-01-14 PROCEDURE — 999N000157 HC STATISTIC RCP TIME EA 10 MIN

## 2025-01-14 PROCEDURE — 250N000009 HC RX 250: Performed by: INTERNAL MEDICINE

## 2025-01-14 PROCEDURE — 250N000013 HC RX MED GY IP 250 OP 250 PS 637: Performed by: INTERNAL MEDICINE

## 2025-01-14 PROCEDURE — 99232 SBSQ HOSP IP/OBS MODERATE 35: CPT | Performed by: INTERNAL MEDICINE

## 2025-01-14 PROCEDURE — 36415 COLL VENOUS BLD VENIPUNCTURE: CPT | Performed by: INTERNAL MEDICINE

## 2025-01-14 PROCEDURE — 250N000013 HC RX MED GY IP 250 OP 250 PS 637: Performed by: STUDENT IN AN ORGANIZED HEALTH CARE EDUCATION/TRAINING PROGRAM

## 2025-01-14 PROCEDURE — 250N000013 HC RX MED GY IP 250 OP 250 PS 637: Performed by: CLINICAL NURSE SPECIALIST

## 2025-01-14 PROCEDURE — 120N000002 HC R&B MED SURG/OB UMMC

## 2025-01-14 PROCEDURE — 250N000013 HC RX MED GY IP 250 OP 250 PS 637

## 2025-01-14 PROCEDURE — 250N000013 HC RX MED GY IP 250 OP 250 PS 637: Performed by: PHYSICIAN ASSISTANT

## 2025-01-14 PROCEDURE — 250N000011 HC RX IP 250 OP 636: Performed by: INTERNAL MEDICINE

## 2025-01-14 PROCEDURE — 250N000012 HC RX MED GY IP 250 OP 636 PS 637: Performed by: INTERNAL MEDICINE

## 2025-01-14 PROCEDURE — 82565 ASSAY OF CREATININE: CPT | Performed by: INTERNAL MEDICINE

## 2025-01-14 PROCEDURE — 85018 HEMOGLOBIN: CPT | Performed by: INTERNAL MEDICINE

## 2025-01-14 PROCEDURE — 85048 AUTOMATED LEUKOCYTE COUNT: CPT | Performed by: INTERNAL MEDICINE

## 2025-01-14 PROCEDURE — 94640 AIRWAY INHALATION TREATMENT: CPT | Mod: 76

## 2025-01-14 PROCEDURE — 99233 SBSQ HOSP IP/OBS HIGH 50: CPT | Performed by: PHYSICIAN ASSISTANT

## 2025-01-14 PROCEDURE — 71046 X-RAY EXAM CHEST 2 VIEWS: CPT | Mod: 26 | Performed by: RADIOLOGY

## 2025-01-14 PROCEDURE — 80048 BASIC METABOLIC PNL TOTAL CA: CPT | Performed by: INTERNAL MEDICINE

## 2025-01-14 RX ORDER — MORPHINE SULFATE 20 MG/ML
5-10 SOLUTION ORAL
Status: DISCONTINUED | OUTPATIENT
Start: 2025-01-14 | End: 2025-02-18 | Stop reason: HOSPADM

## 2025-01-14 RX ORDER — POLYETHYLENE GLYCOL 3350 17 G/17G
8.5 POWDER, FOR SOLUTION ORAL DAILY
Status: DISCONTINUED | OUTPATIENT
Start: 2025-01-14 | End: 2025-01-15

## 2025-01-14 RX ORDER — MORPHINE SULFATE 20 MG/ML
5 SOLUTION ORAL 3 TIMES DAILY
Status: DISCONTINUED | OUTPATIENT
Start: 2025-01-14 | End: 2025-01-23

## 2025-01-14 RX ADMIN — PREDNISONE 20 MG: 20 TABLET ORAL at 18:08

## 2025-01-14 RX ADMIN — METHOCARBAMOL 500 MG: 500 TABLET ORAL at 20:46

## 2025-01-14 RX ADMIN — Medication 1 HALF-TAB: at 13:20

## 2025-01-14 RX ADMIN — MORPHINE SULFATE 10 MG: 20 SOLUTION ORAL at 13:20

## 2025-01-14 RX ADMIN — AZITHROMYCIN DIHYDRATE 500 MG: 250 TABLET ORAL at 08:00

## 2025-01-14 RX ADMIN — IPRATROPIUM BROMIDE AND ALBUTEROL SULFATE 3 ML: .5; 3 SOLUTION RESPIRATORY (INHALATION) at 21:15

## 2025-01-14 RX ADMIN — MORPHINE SULFATE 5 MG: 20 SOLUTION ORAL at 20:45

## 2025-01-14 RX ADMIN — Medication 1 HALF-TAB: at 20:46

## 2025-01-14 RX ADMIN — MORPHINE SULFATE 10 MG: 20 SOLUTION ORAL at 07:50

## 2025-01-14 RX ADMIN — Medication 1 HALF-TAB: at 07:51

## 2025-01-14 RX ADMIN — IPRATROPIUM BROMIDE AND ALBUTEROL SULFATE 3 ML: .5; 3 SOLUTION RESPIRATORY (INHALATION) at 12:51

## 2025-01-14 RX ADMIN — GABAPENTIN 400 MG: 300 CAPSULE ORAL at 20:46

## 2025-01-14 RX ADMIN — LEVOTHYROXINE SODIUM 112 MCG: 112 TABLET ORAL at 07:51

## 2025-01-14 RX ADMIN — IPRATROPIUM BROMIDE AND ALBUTEROL SULFATE 3 ML: .5; 3 SOLUTION RESPIRATORY (INHALATION) at 17:46

## 2025-01-14 RX ADMIN — PIPERACILLIN AND TAZOBACTAM 4.5 G: 4; .5 INJECTION, POWDER, LYOPHILIZED, FOR SOLUTION INTRAVENOUS at 06:11

## 2025-01-14 RX ADMIN — METOPROLOL SUCCINATE 50 MG: 50 TABLET, EXTENDED RELEASE ORAL at 07:51

## 2025-01-14 RX ADMIN — PREDNISONE 20 MG: 20 TABLET ORAL at 07:51

## 2025-01-14 RX ADMIN — CARBIDOPA AND LEVODOPA 1 TABLET: 25; 100 TABLET ORAL at 20:46

## 2025-01-14 RX ADMIN — ROSUVASTATIN 10 MG: 10 TABLET, FILM COATED ORAL at 07:51

## 2025-01-14 RX ADMIN — GABAPENTIN 400 MG: 300 CAPSULE ORAL at 07:51

## 2025-01-14 RX ADMIN — POTASSIUM CHLORIDE 20 MEQ: 1500 TABLET, EXTENDED RELEASE ORAL at 07:51

## 2025-01-14 RX ADMIN — GABAPENTIN 400 MG: 300 CAPSULE ORAL at 13:20

## 2025-01-14 RX ADMIN — AMLODIPINE BESYLATE 5 MG: 5 TABLET ORAL at 22:20

## 2025-01-14 RX ADMIN — CARBIDOPA AND LEVODOPA 1 TABLET: 25; 100 TABLET ORAL at 13:20

## 2025-01-14 RX ADMIN — IPRATROPIUM BROMIDE AND ALBUTEROL SULFATE 3 ML: .5; 3 SOLUTION RESPIRATORY (INHALATION) at 07:51

## 2025-01-14 RX ADMIN — IPRATROPIUM BROMIDE AND ALBUTEROL SULFATE 3 ML: .5; 3 SOLUTION RESPIRATORY (INHALATION) at 00:45

## 2025-01-14 RX ADMIN — PIPERACILLIN AND TAZOBACTAM 4.5 G: 4; .5 INJECTION, POWDER, LYOPHILIZED, FOR SOLUTION INTRAVENOUS at 00:28

## 2025-01-14 RX ADMIN — CARBIDOPA AND LEVODOPA 1 TABLET: 25; 100 TABLET ORAL at 07:51

## 2025-01-14 RX ADMIN — METHOCARBAMOL 500 MG: 500 TABLET ORAL at 07:51

## 2025-01-14 ASSESSMENT — ACTIVITIES OF DAILY LIVING (ADL)
ADLS_ACUITY_SCORE: 42
ADLS_ACUITY_SCORE: 43
ADLS_ACUITY_SCORE: 43
ADLS_ACUITY_SCORE: 42
ADLS_ACUITY_SCORE: 43
ADLS_ACUITY_SCORE: 42
ADLS_ACUITY_SCORE: 42
ADLS_ACUITY_SCORE: 43
ADLS_ACUITY_SCORE: 43
ADLS_ACUITY_SCORE: 42
ADLS_ACUITY_SCORE: 42
ADLS_ACUITY_SCORE: 43
ADLS_ACUITY_SCORE: 43
ADLS_ACUITY_SCORE: 42
ADLS_ACUITY_SCORE: 43
ADLS_ACUITY_SCORE: 42
ADLS_ACUITY_SCORE: 43
ADLS_ACUITY_SCORE: 43
ADLS_ACUITY_SCORE: 42
ADLS_ACUITY_SCORE: 43

## 2025-01-14 NOTE — PROGRESS NOTES
PALLIATIVE CARE PROGRESS NOTE  [facility]     Patient Name: Harrison Thomas  Date of Admission: 1/4/2025   Today the patient was seen for: Follow up goals, symptom management     Recommendations & Counseling     GOALS OF CARE:   Life-prolonging with limits (DNR/DNI). Previously on hospice but wanted life-prolonging care while admitted, has not yet decided whether he wants to enroll in hospice again.  Harrison appears to have limited capacity to participate in decision making today due to fluctuating mentation. Spoke with Janey (daughter/HCA) by phone and again at bedside this afternoon. I shared with Janey my concerns that Harrison may be approaching end of life. We discussed possibility of transitioning to comfort care here in the hospital. Janey is concerned about overmedication and worries changes in Harrison's mentation are due to morphine. Discussed plan to reduce scheduled morphine and see if Harrison is more clear tomorrow. Harrison was amenable to this.  If he is able to discharge the plan has been to discharge with hospice, needing to make a plan that is safe for him outside of the hospital, more support at daughters or sisters house vs. [facility]. Appreciate unit  supporting the process of this.    ADVANCE CARE PLANNING:  Patient has an advance directive dated 3/13/2023.  Primary Health Care Agent Adeola JENNAnahy Thomas- spouse but currently divorce.  Alternate(s) daughter- Janey.  He would not want to involve his wife anymore and would want Janey to support him with decision making.  There is a POLST form on file, but will need to be updated prior to discharge.  Code status: No CPR- Do NOT Intubate    MEDICAL MANAGEMENT:   #Pain,chronic: Usually takes 10 mg oxycodone 4 times a day at home.  #Dyspnea,COPD: dyspneic in bed and with talking. Audible crackles from across the room.  #Lethargy, fluctuating mentation  Duonebs scheduled and PRN  Saline nebs PRN  Fan at bedside  would be helpful, doesn't like air blowing on his face. OK to not use if he doesn't like it. Oxygen is also likely helping some with dyspnea treatment.  REDUCE morphine to 5mg TID and 5-10mg q2h PRN (done)  Continue lorazepam 0.25 mg q6h PRN (using sparingly)     #At risk for OIC: last BM was 1/11  START half packet miralax daily (done)    PSYCHOSOCIAL/SPIRITUAL:  Family - lives at home with daughter Janey  [employer]iss[employer] - [employer]    Palliative Care will continue to follow.     Antoinette Solorio PA-C  [facility], Palliative Care  Securely message with the [employer] Web Console (learn more here) or  Text page via [facility] Paging/Directory      Assessment          Harrison Thomas is a 79 year old male with a past medical history of thyroid cancer with lung metastases, parkinson's disease, neuropathy, glaucoma, osteoporosis, chronic pain, severe COPD, HLD, BRENNEN, CAD who presented on 1/4 with a 7 day history of productive cough and 1 day history of weakness. Upon admission CT pulmonary angio done and was negative for PE, treated for COPD exacerbation and PNA. Prior to admission he enrolled in hospice however presented to the hospital after being unable to reach hospice and then needed to call 911.       Interval History:     Multidisciplinary collaboration:  Discussed with Medicine    Notable medications:  Gabapentin 400mg TID  Duonebs QID  Robaxin 500mg QID  PO morphine concentrate 10mg TID and q2h PRN dyspnea or pain  Prednisone 20mg BID  Tylenol 650mg q4h PRN  Lorazepam 0.25mg q6h PRN anxiety  Miralax BID PRN  Senna-docusate 1-2 tabs BID PRN    Patient/family narrative  Seen alone this morning, then this afternoon with daughter and son in law present. This morning Harrison was resting with eyes closed but arousable to voice. He had delayed responses and appeared to fall asleep before responding at times. He endorsed shortness of breath and ongoing back pain.    This afternoon, Harrison was again lethargic and slow to respond. He had  "limited memory of seeing his family yesterday. He reports he feels \"shitty,\" endorses diffuse body pain. Family reports he has been less alert since yesterday.     Physical Exam:   Temp:  [97.5  F (36.4  C)-97.8  F (36.6  C)] 97.6  F (36.4  C)  Pulse:  [72-79] 72  Resp:  [16-18] 16  BP: (150-177)/(84-95) 153/84  FiO2 (%):  [94 %] 94 %  SpO2:  [95 %-97 %] 97 %  160 lbs 9.6 oz    Physical Exam  Constitutional: lethargic, laying in bed, NAD  Lungs: some increased work of breathing, +audible secretions  Neurologic: lethargic, difficult to maintain alertness, delayed responses, oriented to person and place but not purpose or time; forgetful  Skin: No rashes, erythema        Data Reviewed:     CXR 1/14  IMPRESSION: Unchanged atelectasis, mild edema or other airway  inflammation an osteoporotic mid thoracic compression deformities  unchanged. No acute consolidation or new dominant pleural effusion.    CMP  Recent Labs   Lab 01/14/25  0708 01/13/25  0703   * 148*   POTASSIUM 3.8 4.1   CHLORIDE 103 104   CO2 37* 32*   ANIONGAP 8 12   GLC 85 106*   BUN 37.1* 32.7*   CR 0.94 0.89   GFRESTIMATED 83 88   KARLIE 8.8 9.5     CBC  Recent Labs   Lab 01/14/25  0708 01/13/25  0703   WBC 11.8* 12.8*   RBC 4.76 4.84   HGB 14.5 15.3   HCT 45.1 45.4   MCV 95 94   MCH 30.5 31.6   MCHC 32.2 33.7   RDW 13.5 13.4   PLT 72* 104*       Medical Decision Making       50 MINUTES SPENT BY ME on the date of service doing chart review, history, exam, documentation & further activities per the note.        "

## 2025-01-14 NOTE — PROGRESS NOTES
"[facility]    Medicine Progress Note - Hospitalist Service, GOLD TEAM 17    Date of Admission:  1/4/2025    Assessment & Plan   A: Patient is a 80 y/o man who has a past medical history significant for thyroid cancer with lung metastases, Parkinson's disease, neuropathy, glaucoma, osteoporosis, chronic pain, severe COPD, hyperlipidemia, obstructive sleep apnea and coronary artery disease. Patient presented on 04-Jan-2025 with a 7 day history of productive cough and a 1 day history of weakness. CT pulmonary angiogram was negative for pulmonary embolism. Patient appears to have COPD exacerbation but there is concern for community-acquired pneumonia as well. There is also concern that patient was in early sepsis on presentation.      Patient reportedly saw Pulmonology in Oct-2024 and COPD was moderately well-controlled at that time. Activity was apparently limited much more by his balance and parkinsonism than by respiratory status. Patient has antibiotics and prednisone available for outpatient COPD flares but apparently has not recently used this.      Patient apparently just entered hospice in the past week prior to hospital admission. Patient stated on admission that he was too weak and he and his daughter called their hospice agency and 911 was called and he was brought into the hospital. When provider asked on day of presentation whether patient wished for hospitalization and treatment of his potential infection, patient stated that he was seeking treatment and reported that his goal was to \"be able to do things for myself\". It was discussed that hospice typically focused on comfort rather than diagnostic testing and aggressive medical treatment. Patient acknowledged this but seemed unsure of what his true desires for his care are moving forward.     Patient was on ceftriaxone from 04-Jan-2025 to 08-Jan-2025. Patient was azithromycin 500 mg from 05-Jan-2025 to " 07-Jan-2025. Patient was on zosyn from 09-Jan-2025 to 14-Jan-2025.     P:  1.) Pneumonia:  - Patient initially was treated for a possible community-acquired pneumonia on admission with ceftriaxone and azithromycin.  - Patient subsequently was treated for possible hospital-acquired pneumonia with zosyn.  - Monitoring for recurrent infection.    2.) COPD exacerbation:  - Patient was on prednisone 40 mg daily from 04-Jan-2025 to 08-Jan-2025. Patient received prednisone 20 mg once on 09-Jan-2025 but was then on methylprednisolone 40 mg IV every 8 hours from 09-Jan-2025 yo 13-Jan-2025.   - Patient now on prednisone 20 mg twice daily. Anticipate eventual steroid taper.     3.) Chronic hypoxemic respiratory failure:  - Patient usually on 1-2 litres per minute of supplemental oxygen at baseline.  - Patient patient's daughter, patient may be placed on BiPAP if needed.    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  - Further surveillance as outpatient.    5.) Right upper lobe lung mass s/p radiation therapy with possible radiation pneumonitis:  - Patient had previous VATS biopsy in Feb-2022 that was non-diagnostic.    6.) Post-surgical hypothyroidism:  - Patient on levothyroxine 112 mcg daily.    7.) Rheumatoid arthritis with positive rheumatoid factor:  - Per chart review, patient had once been on methotrexate and prednisone but had stopped these medications a few years ago.  - Patient to f/u as outpatient.    8.) Coronary artery disease with past MI s/p stent placement:  - Patient usually on aspirin and crestor but these are currently on hold.  - Patient continuing metoprolol succinate 50 mg daily.     9.) Hypertension:  - Monitoring on metoprolol succinate.  - Patient had been on lasix but this is on hold in light of hypernatremia.    10.) Hypernatremia:  - Monitoring off lasix.    11.) Hyperlipidemia:  - Patient usually on crestor but this is on  hold.    12.) Parkinson's disease:  - Patient on sinemet three times a day.    13.) Neuropathy, chronic pain syndrome:  - Patient currently on gabapentin 400 mg three times a day.  - Patient usually on methocarbamol but this is currently on hold.    14.) Glaucoma:  - Patient not currently on medication for this.  - Patient to f/u with Ophthalmology as outpatient if further treatment is desired.    15.) Osteoporosis:  - Patient on prolia as outpatient.    16.) Acute thrombocytopenia:  - Monitoring for bleeding.    17.) History of subarachnoid hemorrhage in 2023:  - No active intervention indicated.     18.) Moderate malnutrition in the context of acute vs. chronic illness:  - Supplementing as able.           Diet: Regular Diet Adult Mildly Thick (level 2)  Snacks/Supplements Adult: Ensure Enlive; With Meals    Mir Catheter: Not present  Lines: None     Cardiac Monitoring: None  Code Status: No CPR- Do NOT Intubate      Clinically Significant Risk Factors         # Hypernatremia: Highest Na = 148 mmol/L in last 2 days, will monitor as appropriate         # Thrombocytopenia: Lowest platelets = 72 in last 2 days, will monitor for bleeding   # Hypertension: Noted on problem list     # Acute Hypoxic Respiratory Failure: Documented O2 saturation < 90%. Continue supplemental oxygen as needed          # Moderate Malnutrition: based on nutrition assessment    # Financial/Environmental Concerns:           Social Drivers of Health    Tobacco Use: Medium Risk (10/18/2024)    Patient History     Smoking Tobacco Use: Former     Smokeless Tobacco Use: Never   Physical Activity: Inactive (10/14/2024)    Exercise Vital Sign     Days of Exercise per Week: 0 days     Minutes of Exercise per Session: 0 min   Stress: Stress Concern Present (10/14/2024)    [employer] - Occupational Stress Questionnaire     Feeling of Stress : Very much   Social Connections: Unknown (10/14/2024)    Social Connection and  Isolation Panel [NHANES]     Frequency of Social Gatherings with Friends and Family: More than three times a week          Disposition Plan     Medically Ready for Discharge: Anticipated in 5+ Days             Gonzalez Watt MD  Hospitalist Service, GOLD TEAM 17  [facility]  Securely message with [employer] (more info)  Text page via [facility] Paging/Directory   See signed in provider for up to date coverage information  ______________________________________________________________________    Interval History   Patient indicated difficulty breathing and indicated continued discomfort with breathing.    Physical Exam   Vital Signs: Temp: 97.6  F (36.4  C) Temp src: Oral BP: (!) 153/84 Pulse: 72   Resp: 16 SpO2: 97 % O2 Device: Nasal cannula Oxygen Delivery: 2 LPM  Weight: 160 lbs 9.6 oz    General: Patient mildly dyspneic.  Heart: RRR, S1 S2 w/o murmurs.  Lungs: Breath sounds very diminished.    Labs noted.  Sodium 148; Potassium 3.8; Creatinine 0.94;  WBC 11.8; Hb 14.5; Platelets 72;    CXR:   Unchanged atelectasis, mild edema or other airway  inflammation an osteoporotic mid thoracic compression deformities  unchanged. No acute consolidation or new dominant pleural effusion.

## 2025-01-14 NOTE — PLAN OF CARE
Goal Outcome Evaluation:      Plan of Care Reviewed With: patient    Overall Patient Progress: improvingOverall Patient Progress: improving       VS: BP (!) 177/90 (BP Location: Left arm)   Pulse 79   Temp 97.8  F (36.6  C) (Oral)   Resp 16   Wt 71.5 kg (157 lb 10.1 oz)   SpO2 96%   BMI 23.62 kg/m      O2: > 88% on 2 L via NC, reports SOB on with exertion      Output: Voids spontaneously and adequately    Last BM: 1/13/2025   Activity: Ax1 with walker and gait belt   Skin: Scattered bruising and scabs to BUE, pt has very fragile skin and sensitive to adhesive    Pain: Managed with scheduled morphine    CMS: AO x4, reports numbness and tingling to all extremities    Dressing: Mepilex in place CDI   Diet: Regular diet with mildly thickened liquids (level 2)   LDA: R PIV SL   Equipment: IV pole, call light, walker and gait belt   Plan: TBD   Additional Info:

## 2025-01-15 ENCOUNTER — APPOINTMENT (OUTPATIENT)
Dept: SPEECH THERAPY | Facility: CLINIC | Age: 80
End: 2025-01-15
Payer: MEDICARE

## 2025-01-15 LAB
ANION GAP SERPL CALCULATED.3IONS-SCNC: 12 MMOL/L (ref 7–15)
BUN SERPL-MCNC: 30.1 MG/DL (ref 8–23)
CALCIUM SERPL-MCNC: 9.2 MG/DL (ref 8.8–10.4)
CHLORIDE SERPL-SCNC: 102 MMOL/L (ref 98–107)
CREAT SERPL-MCNC: 0.79 MG/DL (ref 0.67–1.17)
EGFRCR SERPLBLD CKD-EPI 2021: >90 ML/MIN/1.73M2
GLUCOSE SERPL-MCNC: 155 MG/DL (ref 70–99)
HCO3 SERPL-SCNC: 31 MMOL/L (ref 22–29)
POTASSIUM SERPL-SCNC: 4.1 MMOL/L (ref 3.4–5.3)
SODIUM SERPL-SCNC: 145 MMOL/L (ref 135–145)

## 2025-01-15 PROCEDURE — 250N000013 HC RX MED GY IP 250 OP 250 PS 637: Performed by: STUDENT IN AN ORGANIZED HEALTH CARE EDUCATION/TRAINING PROGRAM

## 2025-01-15 PROCEDURE — 94640 AIRWAY INHALATION TREATMENT: CPT | Mod: 76

## 2025-01-15 PROCEDURE — 99232 SBSQ HOSP IP/OBS MODERATE 35: CPT | Performed by: INTERNAL MEDICINE

## 2025-01-15 PROCEDURE — 250N000013 HC RX MED GY IP 250 OP 250 PS 637: Performed by: INTERNAL MEDICINE

## 2025-01-15 PROCEDURE — 250N000012 HC RX MED GY IP 250 OP 636 PS 637: Performed by: INTERNAL MEDICINE

## 2025-01-15 PROCEDURE — 94640 AIRWAY INHALATION TREATMENT: CPT

## 2025-01-15 PROCEDURE — 999N000157 HC STATISTIC RCP TIME EA 10 MIN

## 2025-01-15 PROCEDURE — 82565 ASSAY OF CREATININE: CPT | Performed by: INTERNAL MEDICINE

## 2025-01-15 PROCEDURE — 250N000009 HC RX 250

## 2025-01-15 PROCEDURE — 80048 BASIC METABOLIC PNL TOTAL CA: CPT | Performed by: INTERNAL MEDICINE

## 2025-01-15 PROCEDURE — 250N000013 HC RX MED GY IP 250 OP 250 PS 637

## 2025-01-15 PROCEDURE — 250N000013 HC RX MED GY IP 250 OP 250 PS 637: Performed by: PHYSICIAN ASSISTANT

## 2025-01-15 PROCEDURE — 250N000009 HC RX 250: Performed by: INTERNAL MEDICINE

## 2025-01-15 PROCEDURE — 99232 SBSQ HOSP IP/OBS MODERATE 35: CPT | Performed by: PHYSICIAN ASSISTANT

## 2025-01-15 PROCEDURE — 36415 COLL VENOUS BLD VENIPUNCTURE: CPT | Performed by: INTERNAL MEDICINE

## 2025-01-15 PROCEDURE — 92526 ORAL FUNCTION THERAPY: CPT | Mod: GN

## 2025-01-15 PROCEDURE — 120N000002 HC R&B MED SURG/OB UMMC

## 2025-01-15 PROCEDURE — 82435 ASSAY OF BLOOD CHLORIDE: CPT | Performed by: INTERNAL MEDICINE

## 2025-01-15 RX ORDER — METHOCARBAMOL 500 MG/1
500 TABLET, FILM COATED ORAL 4 TIMES DAILY PRN
Status: DISCONTINUED | OUTPATIENT
Start: 2025-01-15 | End: 2025-02-16

## 2025-01-15 RX ORDER — LORAZEPAM 0.5 MG/1
0.25 TABLET ORAL EVERY 6 HOURS PRN
Status: DISCONTINUED | OUTPATIENT
Start: 2025-01-15 | End: 2025-02-18 | Stop reason: HOSPADM

## 2025-01-15 RX ADMIN — IPRATROPIUM BROMIDE AND ALBUTEROL SULFATE 3 ML: .5; 3 SOLUTION RESPIRATORY (INHALATION) at 12:59

## 2025-01-15 RX ADMIN — CARBIDOPA AND LEVODOPA 1 TABLET: 25; 100 TABLET ORAL at 08:55

## 2025-01-15 RX ADMIN — GABAPENTIN 400 MG: 300 CAPSULE ORAL at 19:45

## 2025-01-15 RX ADMIN — MORPHINE SULFATE 5 MG: 20 SOLUTION ORAL at 14:58

## 2025-01-15 RX ADMIN — Medication 1 HALF-TAB: at 08:55

## 2025-01-15 RX ADMIN — GABAPENTIN 400 MG: 300 CAPSULE ORAL at 14:58

## 2025-01-15 RX ADMIN — IPRATROPIUM BROMIDE AND ALBUTEROL SULFATE 3 ML: .5; 3 SOLUTION RESPIRATORY (INHALATION) at 05:50

## 2025-01-15 RX ADMIN — MORPHINE SULFATE 5 MG: 20 SOLUTION ORAL at 08:58

## 2025-01-15 RX ADMIN — CARBIDOPA AND LEVODOPA 1 TABLET: 25; 100 TABLET ORAL at 19:45

## 2025-01-15 RX ADMIN — ROSUVASTATIN 10 MG: 10 TABLET, FILM COATED ORAL at 08:55

## 2025-01-15 RX ADMIN — PREDNISONE 20 MG: 20 TABLET ORAL at 17:48

## 2025-01-15 RX ADMIN — METOPROLOL SUCCINATE 50 MG: 50 TABLET, EXTENDED RELEASE ORAL at 08:55

## 2025-01-15 RX ADMIN — MORPHINE SULFATE 5 MG: 20 SOLUTION ORAL at 19:44

## 2025-01-15 RX ADMIN — IPRATROPIUM BROMIDE AND ALBUTEROL SULFATE 3 ML: .5; 3 SOLUTION RESPIRATORY (INHALATION) at 09:03

## 2025-01-15 RX ADMIN — IPRATROPIUM BROMIDE AND ALBUTEROL SULFATE 3 ML: .5; 3 SOLUTION RESPIRATORY (INHALATION) at 19:58

## 2025-01-15 RX ADMIN — MORPHINE SULFATE 5 MG: 20 SOLUTION ORAL at 11:10

## 2025-01-15 RX ADMIN — LEVOTHYROXINE SODIUM 112 MCG: 112 TABLET ORAL at 08:55

## 2025-01-15 RX ADMIN — METHOCARBAMOL 500 MG: 500 TABLET ORAL at 11:12

## 2025-01-15 RX ADMIN — CARBIDOPA AND LEVODOPA 1 TABLET: 25; 100 TABLET ORAL at 14:58

## 2025-01-15 RX ADMIN — METHOCARBAMOL 500 MG: 500 TABLET ORAL at 08:55

## 2025-01-15 RX ADMIN — AMLODIPINE BESYLATE 5 MG: 5 TABLET ORAL at 22:55

## 2025-01-15 RX ADMIN — IPRATROPIUM BROMIDE AND ALBUTEROL SULFATE 3 ML: .5; 3 SOLUTION RESPIRATORY (INHALATION) at 17:13

## 2025-01-15 RX ADMIN — GABAPENTIN 400 MG: 300 CAPSULE ORAL at 08:55

## 2025-01-15 RX ADMIN — PREDNISONE 20 MG: 20 TABLET ORAL at 08:55

## 2025-01-15 RX ADMIN — Medication 1 HALF-TAB: at 14:58

## 2025-01-15 RX ADMIN — Medication 1 HALF-TAB: at 19:45

## 2025-01-15 RX ADMIN — POTASSIUM CHLORIDE 20 MEQ: 1500 TABLET, EXTENDED RELEASE ORAL at 08:55

## 2025-01-15 ASSESSMENT — ACTIVITIES OF DAILY LIVING (ADL)
ADLS_ACUITY_SCORE: 43
ADLS_ACUITY_SCORE: 47
ADLS_ACUITY_SCORE: 47
ADLS_ACUITY_SCORE: 43
ADLS_ACUITY_SCORE: 47
ADLS_ACUITY_SCORE: 43
ADLS_ACUITY_SCORE: 47
ADLS_ACUITY_SCORE: 43

## 2025-01-15 NOTE — PROGRESS NOTES
Care Management Follow Up    Length of Stay (days): 11    Expected Discharge Date: 01/16/2025     Concerns to be Addressed: discharge planning     Patient plan of care discussed at interdisciplinary rounds: Yes    Anticipated Discharge Disposition: Residential Hospice vs LTC with hospice          Anticipated Discharge Services: Hospice  Anticipated Discharge DME: TBD    Patient/family educated on Medicare website which has current facility and service quality ratings:  N/A  Education Provided on the Discharge Plan: Yes  Patient/Family in Agreement with the Plan:      Referrals Placed by CM/SW:     Pending:     [facility]  [address] [place] [id number]  Main: [phone]   Admissions (Jonathan): [phone]  Fax: [phone]  1/15: Referral sent    Private pay costs discussed:  Discussed with daughter (Janey) that [facility] is typically the only no cost residential hospice option.    Discussed  Partnership in Safe Discharge Planning  document with patient/family: No     Handoff Completed: No, handoff not indicated or clinically appropriate    Additional Information:  SW following for discharge planning. Per chart review, pt was enrolled with hospice prior to hospitalization. Discharge plan has been to work towards having patient apply for MA to obtain LTC facility with hospice as pt family unable to provide care to pt at home. Per Financial Counseling note from 1/14/25, MA LTC application and signed forms were uploaded to [facility]'s portal (confirmation for sheridan is [id number] and signed forms confirm is [id number]). Bank statements still needed for verification and will need to allow some time for the [facility] to process application.     JARAD received three voice mails from pt's  (Jorge Cooper) requesting discharge update and pt diagnosis noting that a  was needing this information due to pt's missed court date. Jorge juarez call back phone number of [phone].     JARAD  "spoke with pt over the phone to receive verbal confirmation that it was ok to share details with pt . SW explained Jorge's request for information and asked if it would be ok for SW to share these details with Jorge. Pt verbally confirmed that SW could share information with his . SW to attempt to check in with pt later today.     SW attempted to call Jorge back to follow up on VM. There was no answer but automated VM identified as Jorge Cooper. SW left  explaining purpose of call and providing contact information for call back if needed.     ADDENDUM 1450: JARAD received update from Palliative provider that pt daughter (Janey) was agreeable to [facility] ([facility]) referral being sent. Per Gold 17, patient would be ready to discharge to residential hospice once facility is found.     JARAD spoke with Janey over the phone to discuss OLP referral and answer any questions. Janey confirmed that she would like OLP referral sent but noted that she was not sure if pt is mentally ready for hospice yet. Janey shared that she believes pt is \"terrified\" of dying and noted that pt is \"not a spiritual man in any way\" which may cause him to feel some fear of the dying process. Janey also shared some hesitancy on making decisions for pt when he appears to being having some alertness that waxes and wanes. Janey noted that she feels like pt will become angry with her if he feels Janey is \"pushing\" hospice services onto him. Janey requested that SW send OLP referral to see if it is an option and then suggested that SW present OLP as an option if pt is accepted. JARAD agreed that SW would send referral to OLP and SW  team would then discuss OLP option with pt and Janey if able to accept. Janey noted that she was not able to care for patient at home. JARAD provided supportive listening and validation to Janey throughout visit. SW to keep Janey updated on referral process.     JARAD completed OLP application and faxed application with " requesting supporting documentation to OLP.     [facility]  [address] [place] [address]  Main: [phone]   Admissions (Jonathan): [phone]  Fax: [phone]    Next Steps: SW to follow up on OLP referral and present option to pt/family if OLP can accept. SW to continue to work with pt, family and IDT on safe discharge planning.     She Whyte JARAD  [facility]   [facility]   Available via [employer]  Covering ICU 10/[facility] ED JARAD, ph: [phone]

## 2025-01-15 NOTE — PROGRESS NOTES
PALLIATIVE CARE PROGRESS NOTE  [facility]     Patient Name: Harrison Thomas  Date of Admission: 1/4/2025   Today the patient was seen for: Follow up goals, symptom management     Recommendations & Counseling     GOALS OF CARE:   Life-prolonging with limits (DNR/DNI) with plan to discharge to hospice at facility  Harrison appears to have limited capacity to participate in decision making due to fluctuating mentation. Spoke with Janey (daughter/HCA) by phone this morning. I shared that Harrison's mentation is about the same as yesterday despite reduced morphine dose, and he appeared more dyspneic and complained of pain. We discussed possibility of transitioning to comfort-focused care given Harrison's overall lack of improvement despite treatments here in the hospital. Janey is not ready to transition to comfort measures right now, but agrees that discharging with hospice is appropriate. She requests referral to [facility], will request  support.    ADVANCE CARE PLANNING:  Patient has an advance directive dated 3/13/2023.  Primary Health Care Agent Adeola Thomas- spouse but currently divorce.  Alternate(s) daughter- Janey.  He would not want to involve his wife anymore and would want Janey to support him with decision making.  There is a POLST form on file, but will need to be updated prior to discharge.  Code status: No CPR- Do NOT Intubate    MEDICAL MANAGEMENT:   **Important to family to manage symptoms while also maximizing alertness in order to continue to interact with loved ones.    #Pain,chronic  #Dyspnea,COPD  #Lethargy, fluctuating mentation  Duonebs scheduled and PRN  Saline nebs PRN  Fan at bedside would be helpful, doesn't like air blowing on his face. OK to not use if he doesn't like it. Oxygen is also likely helping some with dyspnea treatment.  SL morphine 5mg TID and 5-10mg q2h PRN  Continue lorazepam 0.25 mg q6h PRN  Bowel  "regimen: STOP miralax per patient/family request (done), having bowel movements without it     #Anxiety  Family perceives Harrison is increasingly anxious but doesn't often recognize this himself.  Continue lorazepam 0.25 mg q6h PRN - encourage use for anxiety and/or dyspnea    PSYCHOSOCIAL/SPIRITUAL:  Family - lives at home with pamela Toth  Gissel - Muslim    Palliative Care will continue to follow.     Antoinette Solorio PA-C  [facility], Palliative Care  Securely message with the [employer] Web [employer] (learn more here) or  Text page via [facility] Paging/Directory      Assessment          Harrison Thomas is a 79 year old male with a past medical history of thyroid cancer with lung metastases, parkinson's disease, neuropathy, glaucoma, osteoporosis, chronic pain, severe COPD, HLD, BRENNEN, CAD who presented on 1/4 with a 7 day history of productive cough and 1 day history of weakness. Upon admission CT pulmonary angio done and was negative for PE, treated for COPD exacerbation and PNA. Prior to admission he enrolled in hospice however presented to the hospital after being unable to reach hospice and then needed to call 911.       Interval History:     Multidisciplinary collaboration:  Discussed with Medicine  Had loose bowel movement overnight    Patient/family narrative  Harrison is sitting up in bed, greeted me promptly but still with delayed responses to questions. Feels \"pretty bad\" but unable to elaborate. Endorsed back pain and dyspnea. Complained of diarrhea. Fell asleep before answering further questions.    Called daughter Janey, who shared Harrison was more alert last night and when she called this morning. She also notes he has been having loose bowel movements and it's very distressing for him to require assistance with toileting; he asked her to have us cancel miralax.     Physical Exam:   Temp:  [97.4  F (36.3  C)-97.9  F (36.6  C)] 97.4  F (36.3  C)  Pulse:  [68-89] 76  Resp:  [18-20] 18  BP: (154-168)/(82-90) " 154/82  SpO2:  [92 %-96 %] 96 %  160 lbs 9.6 oz    Physical Exam  Constitutional: lethargic, laying in bed, NAD  Lungs: +tachypneic, +audible secretions  Abdomen: non tender non distended  Neurologic: lethargic, difficult to maintain alertness, delayed responses, oriented to person and place but not purpose or time; forgetful  Skin: No rashes, erythema        Data Reviewed:     CXR 1/14  IMPRESSION: Unchanged atelectasis, mild edema or other airway  inflammation an osteoporotic mid thoracic compression deformities  unchanged. No acute consolidation or new dominant pleural effusion.    CMP  Recent Labs   Lab 01/14/25  0708 01/13/25  0703   * 148*   POTASSIUM 3.8 4.1   CHLORIDE 103 104   CO2 37* 32*   ANIONGAP 8 12   GLC 85 106*   BUN 37.1* 32.7*   CR 0.94 0.89   GFRESTIMATED 83 88   KARLIE 8.8 9.5     CBC  Recent Labs   Lab 01/14/25  0708 01/13/25  0703   WBC 11.8* 12.8*   RBC 4.76 4.84   HGB 14.5 15.3   HCT 45.1 45.4   MCV 95 94   MCH 30.5 31.6   MCHC 32.2 33.7   RDW 13.5 13.4   PLT 72* 104*       Medical Decision Making       MANAGEMENT DISCUSSED with the following over the past 24 hours: Medicine   NOTE(S)/MEDICAL RECORDS REVIEWED over the past 24 hours: Medicine  Medical complexity over the past 24 hours:  - Prescription DRUG MANAGEMENT performed

## 2025-01-15 NOTE — PLAN OF CARE
Goal Outcome Evaluation:      Plan of Care Reviewed With: patient    Overall Patient Progress: improvingOverall Patient Progress: improving           VS: BP (!) 154/82 (BP Location: Left arm)   Pulse 76   Temp 97.4  F (36.3  C) (Oral)   Resp 18   Wt 72.8 kg (160 lb 9.6 oz)   SpO2 96%   BMI 24.06 kg/m     O2: >88% on 2 L via NC, reports SOB on exertion   Output: Voids spontaneously and adequately in bathroom   Last BM: 1/15/25   Activity: A1 with walker, declined gait belt   Skin: Scattered bruising/scabs to BUE  Sensitive skin   Pain: Managed with scheduled and PRN morphine   CMS: A&O x 4, reports numbness and tingling  Intermittently confused   Dressing: NA   Diet: Regular diet with mildly thickened liquids (level 2)   LDA: R PIV SL   Equipment: IV pole, call light, walker,and gait belt   Plan: TBD   Additional Info:

## 2025-01-15 NOTE — PLAN OF CARE
Goal Outcome Evaluation:      Plan of Care Reviewed With: patient    Overall Patient Progress: no changeOverall Patient Progress: no change    Outcome Evaluation: Pt AOx4, seen by palliative care provider today, morphine orders modified. Still having dyspnea on exertion, reported feeling slightly better today, less confusion than yesterday. Appears more confused after waking up from a nap. Plan to continue POC for pain management and O2 sat monitoring.    Patient vital signs are at baseline: Yes  Patient able to ambulate as they were prior to admission or with assist devices provided by therapies during their stay:  Per patients family - would ambulate independently but is Ax1 w/ transfers and ambulating while here d/t generalized weakness and being unsteady at times - bed alarm on  Patient MUST void prior to discharge:  Yes  Patient able to tolerate oral intake:  Yes  Pain has adequate pain control using Oral analgesics:  Yes  Does patient have an identified :  Yes  Has goal D/C date and time been discussed with patient:  KESHA

## 2025-01-15 NOTE — PLAN OF CARE
Goal Outcome Evaluation:      Plan of Care Reviewed With: patient    Overall Patient Progress: improvingOverall Patient Progress: improving       VS: BP (!) 162/89 (BP Location: Left arm, Patient Position: Semi-Rodriguez's, Cuff Size: Adult Regular)   Pulse 89   Temp 97.9  F (36.6  C) (Oral)   Resp 20   Wt 72.8 kg (160 lb 9.6 oz)   SpO2 94%   BMI 24.06 kg/m      O2: > 88% on 2 L via NC, reports SOB on exertion    Output: Voids spontaneously and adequately    Last BM: 1/15/2025   Activity: AX1 with gait belt and walker    Skin: Scattered bruising and scabs to BUE, pt has very sensitive skin    Pain: Managed with scheduled morphine    CMS: AO x4, reports numbness and tingling BLE   Dressing: Mepliex in place CDI   Diet: Regular diet with mildly thickened liquids (level 2)   LDA: R PIV SL   Equipment: IV pole, call light, walker and gait belt    Plan: TBD   Additional Info:

## 2025-01-15 NOTE — PROGRESS NOTES
"[facility]    Medicine Progress Note - Hospitalist Service, GOLD TEAM 17    Date of Admission:  1/4/2025    Assessment & Plan   A: Patient is a 80 y/o man who has a past medical history significant for thyroid cancer with lung metastases, Parkinson's disease, neuropathy, glaucoma, osteoporosis, chronic pain, severe COPD, hyperlipidemia, obstructive sleep apnea and coronary artery disease. Patient presented on 04-Jan-2025 with a 7 day history of productive cough and a 1 day history of weakness. CT pulmonary angiogram was negative for pulmonary embolism. Patient appears to have COPD exacerbation but there is concern for community-acquired pneumonia as well. There is also concern that patient was in early sepsis on presentation.      Patient reportedly saw Pulmonology in Oct-2024 and COPD was moderately well-controlled at that time. Activity was apparently limited much more by his balance and parkinsonism than by respiratory status. Patient has antibiotics and prednisone available for outpatient COPD flares but apparently has not recently used this.      Patient apparently just entered hospice in the past week prior to hospital admission. Patient stated on admission that he was too weak and he and his daughter called their hospice agency and 911 was called and he was brought into the hospital. When provider asked on day of presentation whether patient wished for hospitalization and treatment of his potential infection, patient stated that he was seeking treatment and reported that his goal was to \"be able to do things for myself\". It was discussed that hospice typically focused on comfort rather than diagnostic testing and aggressive medical treatment. Patient acknowledged this but seemed unsure of what his true desires for his care are moving forward.      Patient was on ceftriaxone from 04-Jan-2025 to 08-Jan-2025. Patient was azithromycin 500 mg from 05-Jan-2025 to " 07-Jan-2025. Patient was on zosyn from 09-Jan-2025 to 14-Jan-2025.      P:  1.) Pneumonia:  - Patient initially was treated for a possible community-acquired pneumonia on admission with ceftriaxone and azithromycin.  - Patient subsequently was treated for possible hospital-acquired pneumonia with zosyn.  - Monitoring for recurrent infection.     2.) COPD exacerbation:  - Patient was on prednisone 40 mg daily from 04-Jan-2025 to 08-Jan-2025. Patient received prednisone 20 mg once on 09-Jan-2025 but was then on methylprednisolone 40 mg IV every 8 hours from 09-Jan-2025 yo 13-Jan-2025.   - Patient now on prednisone 20 mg twice daily. Anticipate eventual steroid taper.   - Patient on azithromycin 500 mg every other day for now.     3.) Chronic hypoxemic respiratory failure:  - Patient usually on 1-2 litres per minute of supplemental oxygen at baseline.  - Patient patient's daughter, patient may be placed on BiPAP if needed.     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  - Further surveillance as outpatient.     5.) Right upper lobe lung mass s/p radiation therapy with possible radiation pneumonitis:  - Patient had previous VATS biopsy in Feb-2022 that was non-diagnostic.     6.) Post-surgical hypothyroidism:  - Patient on levothyroxine 112 mcg daily.     7.) Rheumatoid arthritis with positive rheumatoid factor:  - Per chart review, patient had once been on methotrexate and prednisone but had stopped these medications a few years ago.  - Patient to f/u as outpatient.     8.) Coronary artery disease with past MI s/p stent placement:  - Patient usually on aspirin and crestor but these are currently on hold.  - Patient continuing metoprolol succinate 50 mg daily.      9.) Hypertension:  - Monitoring on metoprolol succinate.  - Patient had been on lasix but this is on hold in light of hypernatremia.     10.) Hypernatremia, improved:  - Monitoring off  lasix.     11.) Hyperlipidemia:  - Patient usually on crestor but this is on hold.     12.) Parkinson's disease:  - Patient on sinemet three times a day.     13.) Neuropathy, chronic pain syndrome:  - Patient currently on gabapentin 400 mg three times a day.  - Patient usually on methocarbamol but this is currently on hold.     14.) Glaucoma:  - Patient not currently on medication for this.  - Patient to f/u with Ophthalmology as outpatient if further treatment is desired.     15.) Osteoporosis:  - Patient on prolia as outpatient.     16.) Acute thrombocytopenia:  - Monitoring for bleeding.     17.) History of subarachnoid hemorrhage in 2023:  - No active intervention indicated.     18.) Moderate malnutrition in the context of acute vs. chronic illness:  - Supplementing as able.              Diet: Regular Diet Adult Mildly Thick (level 2)  Snacks/Supplements Adult: Ensure Enlive; With Meals    DVT Prophylaxis: Pneumatic compression device ordered.   Mir Catheter: Not present  Lines: None     Cardiac Monitoring: None  Code Status: No CPR- Do NOT Intubate      Clinically Significant Risk Factors         # Hypernatremia: Highest Na = 148 mmol/L in last 2 days, will monitor as appropriate         # Thrombocytopenia: Lowest platelets = 72 in last 2 days, will monitor for bleeding   # Hypertension: Noted on problem list     # Acute Hypoxic Respiratory Failure: Documented O2 saturation < 90%. Continue supplemental oxygen as needed          # Moderate Malnutrition: based on nutrition assessment    # Financial/Environmental Concerns:           Social Drivers of Health    Tobacco Use: Medium Risk (10/18/2024)    Patient History     Smoking Tobacco Use: Former     Smokeless Tobacco Use: Never   Physical Activity: Inactive (10/14/2024)    Exercise Vital Sign     Days of Exercise per Week: 0 days     Minutes of Exercise per Session: 0 min   Stress: Stress Concern Present (10/14/2024)    [employer]  - Occupational Stress Questionnaire     Feeling of Stress : Very much   Social Connections: Unknown (10/14/2024)    Social Connection and Isolation Panel [NHANES]     Frequency of Social Gatherings with Friends and Family: More than three times a week          Disposition Plan     Medically Ready for Discharge: Anticipated in 5+ Days             Gonzalez Watt MD  Hospitalist Service, GOLD TEAM 17  [facility]  Securely message with [employer] (more info)  Text page via [facility] Paging/Directory   See signed in provider for up to date coverage information  ______________________________________________________________________    Interval History   Patient noted continued dyspnea.     Physical Exam   Vital Signs: Temp: 97.4  F (36.3  C) Temp src: Oral BP: (!) 154/82 Pulse: 76   Resp: 18 SpO2: 96 % O2 Device: Nasal cannula Oxygen Delivery: 2 LPM  Weight: 160 lbs 9.6 oz    General: Patient breathing quickly.   Lungs: Breath sounds diminished. Some crackles in lower lung fields.    Labs noted.  Sodium 145; Potassium 4.1; Creatinine 0.79;

## 2025-01-16 LAB
ANION GAP SERPL CALCULATED.3IONS-SCNC: 11 MMOL/L (ref 7–15)
BUN SERPL-MCNC: 25.4 MG/DL (ref 8–23)
CALCIUM SERPL-MCNC: 9 MG/DL (ref 8.8–10.4)
CHLORIDE SERPL-SCNC: 103 MMOL/L (ref 98–107)
CREAT SERPL-MCNC: 0.81 MG/DL (ref 0.67–1.17)
EGFRCR SERPLBLD CKD-EPI 2021: >90 ML/MIN/1.73M2
GLUCOSE SERPL-MCNC: 118 MG/DL (ref 70–99)
HCO3 SERPL-SCNC: 30 MMOL/L (ref 22–29)
HOLD SPECIMEN: NORMAL
POTASSIUM SERPL-SCNC: 4.3 MMOL/L (ref 3.4–5.3)
SODIUM SERPL-SCNC: 144 MMOL/L (ref 135–145)

## 2025-01-16 PROCEDURE — 99231 SBSQ HOSP IP/OBS SF/LOW 25: CPT | Performed by: PHYSICIAN ASSISTANT

## 2025-01-16 PROCEDURE — 250N000009 HC RX 250

## 2025-01-16 PROCEDURE — 250N000013 HC RX MED GY IP 250 OP 250 PS 637

## 2025-01-16 PROCEDURE — 120N000002 HC R&B MED SURG/OB UMMC

## 2025-01-16 PROCEDURE — 250N000013 HC RX MED GY IP 250 OP 250 PS 637: Performed by: PHYSICIAN ASSISTANT

## 2025-01-16 PROCEDURE — 250N000012 HC RX MED GY IP 250 OP 636 PS 637: Performed by: INTERNAL MEDICINE

## 2025-01-16 PROCEDURE — 250N000013 HC RX MED GY IP 250 OP 250 PS 637: Performed by: INTERNAL MEDICINE

## 2025-01-16 PROCEDURE — 250N000009 HC RX 250: Performed by: INTERNAL MEDICINE

## 2025-01-16 PROCEDURE — 94640 AIRWAY INHALATION TREATMENT: CPT | Mod: 76

## 2025-01-16 PROCEDURE — 80048 BASIC METABOLIC PNL TOTAL CA: CPT | Performed by: INTERNAL MEDICINE

## 2025-01-16 PROCEDURE — 999N000157 HC STATISTIC RCP TIME EA 10 MIN

## 2025-01-16 PROCEDURE — 94640 AIRWAY INHALATION TREATMENT: CPT

## 2025-01-16 PROCEDURE — 82565 ASSAY OF CREATININE: CPT | Performed by: INTERNAL MEDICINE

## 2025-01-16 PROCEDURE — 250N000013 HC RX MED GY IP 250 OP 250 PS 637: Performed by: STUDENT IN AN ORGANIZED HEALTH CARE EDUCATION/TRAINING PROGRAM

## 2025-01-16 PROCEDURE — 99232 SBSQ HOSP IP/OBS MODERATE 35: CPT | Performed by: INTERNAL MEDICINE

## 2025-01-16 PROCEDURE — 36415 COLL VENOUS BLD VENIPUNCTURE: CPT | Performed by: INTERNAL MEDICINE

## 2025-01-16 RX ADMIN — MORPHINE SULFATE 5 MG: 20 SOLUTION ORAL at 19:42

## 2025-01-16 RX ADMIN — Medication 1 HALF-TAB: at 08:09

## 2025-01-16 RX ADMIN — METOPROLOL SUCCINATE 50 MG: 50 TABLET, EXTENDED RELEASE ORAL at 08:09

## 2025-01-16 RX ADMIN — GABAPENTIN 400 MG: 300 CAPSULE ORAL at 19:42

## 2025-01-16 RX ADMIN — AMLODIPINE BESYLATE 5 MG: 5 TABLET ORAL at 21:53

## 2025-01-16 RX ADMIN — PREDNISONE 20 MG: 20 TABLET ORAL at 08:09

## 2025-01-16 RX ADMIN — IPRATROPIUM BROMIDE AND ALBUTEROL SULFATE 3 ML: .5; 3 SOLUTION RESPIRATORY (INHALATION) at 03:30

## 2025-01-16 RX ADMIN — PREDNISONE 20 MG: 20 TABLET ORAL at 17:12

## 2025-01-16 RX ADMIN — AZITHROMYCIN DIHYDRATE 500 MG: 250 TABLET ORAL at 08:09

## 2025-01-16 RX ADMIN — IPRATROPIUM BROMIDE AND ALBUTEROL SULFATE 3 ML: .5; 3 SOLUTION RESPIRATORY (INHALATION) at 16:40

## 2025-01-16 RX ADMIN — CARBIDOPA AND LEVODOPA 1 TABLET: 25; 100 TABLET ORAL at 19:42

## 2025-01-16 RX ADMIN — GABAPENTIN 400 MG: 300 CAPSULE ORAL at 08:09

## 2025-01-16 RX ADMIN — CARBIDOPA AND LEVODOPA 1 TABLET: 25; 100 TABLET ORAL at 14:30

## 2025-01-16 RX ADMIN — IPRATROPIUM BROMIDE AND ALBUTEROL SULFATE 3 ML: .5; 3 SOLUTION RESPIRATORY (INHALATION) at 20:36

## 2025-01-16 RX ADMIN — Medication 1 HALF-TAB: at 19:42

## 2025-01-16 RX ADMIN — MORPHINE SULFATE 5 MG: 20 SOLUTION ORAL at 08:09

## 2025-01-16 RX ADMIN — ROSUVASTATIN 10 MG: 10 TABLET, FILM COATED ORAL at 08:09

## 2025-01-16 RX ADMIN — IPRATROPIUM BROMIDE AND ALBUTEROL SULFATE 3 ML: .5; 3 SOLUTION RESPIRATORY (INHALATION) at 13:12

## 2025-01-16 RX ADMIN — MORPHINE SULFATE 5 MG: 20 SOLUTION ORAL at 14:30

## 2025-01-16 RX ADMIN — POTASSIUM CHLORIDE 20 MEQ: 1500 TABLET, EXTENDED RELEASE ORAL at 08:09

## 2025-01-16 RX ADMIN — CARBIDOPA AND LEVODOPA 1 TABLET: 25; 100 TABLET ORAL at 08:09

## 2025-01-16 RX ADMIN — IPRATROPIUM BROMIDE AND ALBUTEROL SULFATE 3 ML: .5; 3 SOLUTION RESPIRATORY (INHALATION) at 09:17

## 2025-01-16 RX ADMIN — Medication 1 HALF-TAB: at 14:30

## 2025-01-16 RX ADMIN — LEVOTHYROXINE SODIUM 112 MCG: 112 TABLET ORAL at 08:09

## 2025-01-16 RX ADMIN — GABAPENTIN 400 MG: 300 CAPSULE ORAL at 14:30

## 2025-01-16 ASSESSMENT — ACTIVITIES OF DAILY LIVING (ADL)
ADLS_ACUITY_SCORE: 46
ADLS_ACUITY_SCORE: 47
ADLS_ACUITY_SCORE: 46
ADLS_ACUITY_SCORE: 47
ADLS_ACUITY_SCORE: 46
ADLS_ACUITY_SCORE: 46

## 2025-01-16 NOTE — PROGRESS NOTES
Care Management Follow Up    Length of Stay (days): 12    Expected Discharge Date: 01/19/25     Concerns to be Addressed: discharge planning     Patient plan of care discussed at interdisciplinary rounds: Yes    Anticipated Discharge Disposition: Home, Hospice              Anticipated Discharge Services: Other (see comment) (current on home hospice)  Anticipated Discharge DME: None    Patient/family educated on Medicare website which has current facility and service quality ratings:    Education Provided on the Discharge Plan: Yes  Patient/Family in Agreement with the Plan:      Referrals Placed by CM/SW:   [facility]  [address] [place] [address]  Main: [phone]   Admissions (Jonathan): [phone]  Fax: [phone]  1/15: Referral sent  Homecare (currently on home hospice)  Private pay costs discussed: private room/amenity fees    Discussed  Partnership in Safe Discharge Planning  document with patient/family: Yes:     Handoff Completed: No, handoff not indicated or clinically appropriate    Additional Information:  SW left VM with [facility] Admissions ([facility]): [phone]. JARAD spoke with Jonathan who requested C-DIFF test to determine Pt's infection status. Jonathan also stated  that Pt would need to be  off BIPAP at [facility] and  that they could accommodate up to 10 Liters of Oxygen. Jonathan  identified no openings today and likely none tomorrow. JARAD updated Pt's  on  Pt's status and that he is currently still in the hospital and discharge plan is for Hospice at this time.     Next Steps:   SW/RNCC will continue to follow for safe discharge placement and planning.      Antonio Houston, [facility]  [facility] & [place] [facility]   Ph: [phone]

## 2025-01-16 NOTE — PROGRESS NOTES
PALLIATIVE CARE PROGRESS NOTE  [facility]     Patient Name: Harrison Thomas  Date of Admission: 1/4/2025   Today the patient was seen for: Follow up goals, symptom management     Recommendations & Counseling     GOALS OF CARE:   Life-prolonging with limits (DNR/DNI) with plan to discharge to hospice at facility  Janey (daughter/HCA) has been serving as surrogate decision maker with input from Harrison as able in setting of fluctuating mentation.    ADVANCE CARE PLANNING:  Patient has an advance directive dated 3/13/2023.  Primary Health Care Agent Adeola LOCKHART Jusarah- spouse but currently divorce.  Alternate(s) daughter- Janey.  He would not want to involve his wife anymore and would want Janey to support him with decision making.  There is a POLST form on file, but will need to be updated prior to discharge.  Code status: No CPR- Do NOT Intubate    MEDICAL MANAGEMENT:   **Important to family to manage symptoms while also maximizing alertness in order to continue to interact with loved ones.    #Pain,chronic  #Dyspnea,COPD  #Lethargy, fluctuating mentation  Mentation clearing today, ok to continue at lower dose morphine with PRN doses available.  Duonebs scheduled and PRN  Saline nebs PRN  Fan at bedside would be helpful, doesn't like air blowing on his face. OK to not use if he doesn't like it. Oxygen is also likely helping some with dyspnea treatment.  SL morphine 5mg TID and 5-10mg q2h PRN  Continue lorazepam 0.25 mg q6h PRN  Bowel regimen: no laxatives per patient/family request, having bowel movements without it     #Anxiety  Family perceives Harrison is increasingly anxious but doesn't often recognize this himself.  Continue lorazepam 0.25 mg q6h PRN - encourage use for anxiety and/or dyspnea    PSYCHOSOCIAL/SPIRITUAL:  Family - lives at home with pamela Toth  [employer]    Palliative Care will continue to follow.     Antoinette Solorio PA-C  [facility],  Palliative Care  Securely message with the [employer] Web [employer] (learn more here) or  Text page via [facility] Paging/Directory      Assessment          Harrison Thomas is a 79 year old male with a past medical history of thyroid cancer with lung metastases, parkinson's disease, neuropathy, glaucoma, osteoporosis, chronic pain, severe COPD, HLD, BRENNEN, CAD who presented on 1/4 with a 7 day history of productive cough and 1 day history of weakness. Upon admission CT pulmonary angio done and was negative for PE, treated for COPD exacerbation and PNA. Prior to admission he enrolled in hospice however presented to the hospital after being unable to reach hospice and then needed to call 911.       Interval History:     Multidisciplinary collaboration:  Discussed with Medicine  Received PO morphine 5mg x4 past 24h (1 PRN dose)    Patient/family narrative  Harrison is sitting up in bed, eating lunch independently. He reports he is feeling about the same. Breathing is difficult, appears uncomfortable. Continues to have back pain. Feels morphine is helpful. Knows daughter is coming in today. Remained alert and interactive throughout my visit.     Physical Exam:   Temp:  [97.7  F (36.5  C)-98.6  F (37  C)] 97.8  F (36.6  C)  Pulse:  [75-87] 76  Resp:  [15-18] 16  BP: (130-161)/(65-87) 152/87  SpO2:  [93 %-98 %] 95 %  160 lbs 6.4 oz    Physical Exam  Constitutional: sitting up in bed, NAD  Lungs: +tachypneic, +increased work of breathing but able to eat and speak in short phrases  Abdomen: non tender non distended  Neurologic: alert and maintained alertness throughout visit; recalls daughter is coming today, feeding himself without difficulty  Skin: No rashes, erythema        Data Reviewed:     CXR 1/14  IMPRESSION: Unchanged atelectasis, mild edema or other airway  inflammation an osteoporotic mid thoracic compression deformities  unchanged. No acute consolidation or new dominant pleural effusion.    CMP  Recent Labs   Lab  01/16/25  0604 01/15/25  1101    145   POTASSIUM 4.3 4.1   CHLORIDE 103 102   CO2 30* 31*   ANIONGAP 11 12   * 155*   BUN 25.4* 30.1*   CR 0.81 0.79   GFRESTIMATED >90 >90   KARLIE 9.0 9.2     CBC  Recent Labs   Lab 01/14/25  0708 01/13/25  0703   WBC 11.8* 12.8*   RBC 4.76 4.84   HGB 14.5 15.3   HCT 45.1 45.4   MCV 95 94   MCH 30.5 31.6   MCHC 32.2 33.7   RDW 13.5 13.4   PLT 72* 104*       Medical Decision Making       MANAGEMENT DISCUSSED with the following over the past 24 hours: Medicine   NOTE(S)/MEDICAL RECORDS REVIEWED over the past 24 hours: Medicine  Medical complexity over the past 24 hours:  - Prescription DRUG MANAGEMENT performed

## 2025-01-16 NOTE — PLAN OF CARE
Speech Language Therapy Discharge Summary    Reason for therapy discharge:    No further expectations of functional progress.    Progress towards therapy goal(s). See goals on Care Plan in [facility] electronic health record for goal details.  Goals met    Therapy recommendation(s):    No further therapy is recommended. At time of discharge from Speech Therapy, patient was tolerating diet of Regular food textures without s/sx of aspiration. Liquids were advanced to thin on this date per patient preference. To maximize safety and ease of PO intake, patient to be fully alert and sitting completely upright for all intake, eat slowly, take small bites/sips, and take rest breaks as needed to catch his breath during meals.

## 2025-01-16 NOTE — PLAN OF CARE
Goal Outcome Evaluation:      Plan of Care Reviewed With: patient    Overall Patient Progress: no change    Outcome Evaluation: No acute changes this shift.    Shift: 1530 to 1930  VS: Temp: 97.8  F (36.6  C) Temp src: Oral BP: (!) 161/81 Pulse: 84   Resp: 15 SpO2: 98 % O2 Device: Nasal cannula Oxygen Delivery: 2 LPM  Neuro: A&Ox4, endorses baseline neuropathy to BL feet.  Pain: Endorses pain, managed with scheduled medications.  Cardiac: Denies CP. HTN last /81.  Respiratory: Pt endorses dyspnea on exertion, slight SOB at rest, >88% on 2L O2 NC.  Diet/Appetite: Regular diet, takes medications whole with thin liquids. Denies N/V.  /GI: Continent of B&B, LBM1/15, pt endorses diarrhea x2 today.  Activity: Ax1 walker for transfers.  LDAs: R PIV SL.  Skin: Skin intact except for BUE cracked skin, bruising, and scabbing; and abrasion to buttocks.  Infection/Isolation: Standard precautions.  Other: Family at bedside at end of shift.  Plan: TBD.

## 2025-01-16 NOTE — PLAN OF CARE
Goal Outcome Evaluation:      Plan of Care Reviewed With: patient    Overall Patient Progress: improvingOverall Patient Progress: improving       VS: /65   Pulse 87   Temp 97.7  F (36.5  C) (Oral)   Resp 18   Wt 72.8 kg (160 lb 9.6 oz)   SpO2 94%   BMI 24.06 kg/m      O2: > 88% on 2L via NC, reports SOB on exertion, denies chest pain   Output: Voids spontaneously and adequately    Last BM: 1/16/2025   Activity: Ax1 with gait belt and walker    Skin: Scattered bruising and scabs to BUE, pt has very sensitive skin    Pain: Managed with scheduled morphine    CMS: AO x4, reports numbness and tingling to all extremities    Dressing: Mepliex in place CDI   Diet: Regular    LDA: R PIV SL   Equipment: IV pole, call light, walker, gait belt and personal belongings    Plan: TBD   Additional Info:

## 2025-01-16 NOTE — PROGRESS NOTES
"[facility]    Medicine Progress Note - Hospitalist Service, GOLD TEAM 17    Date of Admission:  1/4/2025    Assessment & Plan   A: Patient is a 80 y/o man who has a past medical history significant for thyroid cancer with lung metastases, Parkinson's disease, neuropathy, glaucoma, osteoporosis, chronic pain, severe COPD, hyperlipidemia, obstructive sleep apnea and coronary artery disease. Patient presented on 04-Jan-2025 with a 7 day history of productive cough and a 1 day history of weakness. CT pulmonary angiogram was negative for pulmonary embolism. Patient appears to have COPD exacerbation but there is concern for community-acquired pneumonia as well. There is also concern that patient was in early sepsis on presentation.      Patient reportedly saw Pulmonology in Oct-2024 and COPD was moderately well-controlled at that time. Activity was apparently limited much more by his balance and parkinsonism than by respiratory status. Patient has antibiotics and prednisone available for outpatient COPD flares but apparently has not recently used this.      Patient apparently just entered hospice in the past week prior to hospital admission. Patient stated on admission that he was too weak and he and his daughter called their hospice agency and 911 was called and he was brought into the hospital. When provider asked on day of presentation whether patient wished for hospitalization and treatment of his potential infection, patient stated that he was seeking treatment and reported that his goal was to \"be able to do things for myself\". It was discussed that hospice typically focused on comfort rather than diagnostic testing and aggressive medical treatment. Patient acknowledged this but seemed unsure of what his true desires for his care are moving forward.      Patient was on ceftriaxone from 04-Jan-2025 to 08-Jan-2025. Patient was azithromycin 500 mg from 05-Jan-2025 to " 07-Jan-2025. Patient was on zosyn from 09-Jan-2025 to 14-Jan-2025.      P:  1.) Pneumonia:  - Patient initially was treated for a possible community-acquired pneumonia on admission with ceftriaxone and azithromycin.  - Patient subsequently was treated for possible hospital-acquired pneumonia with zosyn.  - Monitoring for recurrent infection.     2.) COPD exacerbation:  - Patient was on prednisone 40 mg daily from 04-Jan-2025 to 08-Jan-2025. Patient received prednisone 20 mg once on 09-Jan-2025 but was then on methylprednisolone 40 mg IV every 8 hours from 09-Jan-2025 yo 13-Jan-2025.   - Patient now on prednisone 20 mg twice daily. Anticipate eventual steroid taper.   - Patient on azithromycin 500 mg every other day for now.     3.) Chronic hypoxemic respiratory failure:  - Patient usually on 1-2 litres per minute of supplemental oxygen at baseline.  - Patient patient's daughter, patient may be placed on BiPAP if needed.     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  - Further surveillance as outpatient.     5.) Right upper lobe lung mass s/p radiation therapy with possible radiation pneumonitis:  - Patient had previous VATS biopsy in Feb-2022 that was non-diagnostic.     6.) Post-surgical hypothyroidism:  - Patient on levothyroxine 112 mcg daily.     7.) Rheumatoid arthritis with positive rheumatoid factor:  - Per chart review, patient had once been on methotrexate and prednisone but had stopped these medications a few years ago.  - Patient to f/u as outpatient.     8.) Coronary artery disease with past MI s/p stent placement:  - Patient usually on aspirin and crestor but these are currently on hold.  - Patient continuing metoprolol succinate 50 mg daily.      9.) Hypertension:  - Monitoring on metoprolol succinate.  - Patient had been on lasix but this is on hold in light of hypernatremia.     10.) Hypernatremia, improved:  - Monitoring off  lasix.     11.) Hyperlipidemia:  - Patient usually on crestor but this is on hold.     12.) Parkinson's disease:  - Patient on sinemet three times a day.     13.) Neuropathy, chronic pain syndrome:  - Patient currently on gabapentin 400 mg three times a day.  - Patient usually on methocarbamol but this is currently on hold.     14.) Glaucoma:  - Patient not currently on medication for this.  - Patient to f/u with Ophthalmology as outpatient if further treatment is desired.     15.) Osteoporosis:  - Patient on prolia as outpatient.     16.) Acute thrombocytopenia:  - Monitoring for bleeding.     17.) History of subarachnoid hemorrhage in 2023:  - No active intervention indicated.     18.) Moderate malnutrition in the context of acute vs. chronic illness:  - Supplementing as able.     19.) Discharge planning for hospice facility.          Diet: Snacks/Supplements Adult: Ensure Enlive; With Meals  Regular Diet Adult Thin Liquids (level 0)    Mir Catheter: Not present  Lines: None     Cardiac Monitoring: None  Code Status: No CPR- Do NOT Intubate      Clinically Significant Risk Factors                   # Hypertension: Noted on problem list     # Acute Hypoxic Respiratory Failure: Documented O2 saturation < 90%. Continue supplemental oxygen as needed          # Moderate Malnutrition: based on nutrition assessment    # Financial/Environmental Concerns:           Social Drivers of Health    Tobacco Use: Medium Risk (10/18/2024)    Patient History     Smoking Tobacco Use: Former     Smokeless Tobacco Use: Never   Physical Activity: Inactive (10/14/2024)    Exercise Vital Sign     Days of Exercise per Week: 0 days     Minutes of Exercise per Session: 0 min   Stress: Stress Concern Present (10/14/2024)    [employer] - Occupational Stress Questionnaire     Feeling of Stress : Very much   Social Connections: Unknown (10/14/2024)    Social Connection and Isolation Panel [NHANES]     Frequency of  Social Gatherings with Friends and Family: More than three times a week          Disposition Plan     Medically Ready for Discharge: Anticipated in 5+ Days             Gonzalez Watt MD  Hospitalist Service, GOLD TEAM 17  [facility]  Securely message with [employer] (more info)  Text page via [employer] Paging/Directory   See signed in provider for up to date coverage information  ______________________________________________________________________    Interval History   Patient noted dyspnea. Patient noted no new problems.     Physical Exam   Vital Signs: Temp: 97.8  F (36.6  C) Temp src: Oral BP: (!) 152/87 Pulse: 76   Resp: 16 SpO2: 95 % O2 Device: Nasal cannula Oxygen Delivery: 2 LPM  Weight: 160 lbs 6.4 oz    General: Patient NAD.     Labs noted.  Sodium 144; Potassium 4.3; Creatinine 0.81;

## 2025-01-16 NOTE — PLAN OF CARE
Goal Outcome Evaluation:      Plan of Care Reviewed With: patient    Overall Patient Progress: no changeOverall Patient Progress: no change    Outcome Evaluation: No acute changes this shift.      VS: BP (!) 152/87 (BP Location: Left arm)   Pulse 76   Temp 97.8  F (36.6  C) (Oral)   Resp 16   Wt 72.8 kg (160 lb 6.4 oz)   SpO2 95%   BMI 24.03 kg/m     O2: SpO2>88% on 2L via NC, SOB on exertion, denies chest pain.    Output: Voids spontaneously and adequately    Last BM: 1/16/2025   Activity: Ax1 with gait belt and walker    Skin: Scattered bruising  Thin delicate skin   Pain: Managed with scheduled morphine   CMS: A&O x 4, reports n/t in all extremities.    Dressing: Mepilex in place, CDI   Diet: Regular   LDA: R PIV SL   Equipment: IV pole, call light , walker, gait belt and personal belongings   Plan: TBD   Additional Info: Enteric precaution to rule out C-diff.

## 2025-01-17 LAB — C DIFF TOX B STL QL: NEGATIVE

## 2025-01-17 PROCEDURE — 250N000013 HC RX MED GY IP 250 OP 250 PS 637: Performed by: PHYSICIAN ASSISTANT

## 2025-01-17 PROCEDURE — 250N000009 HC RX 250

## 2025-01-17 PROCEDURE — 250N000013 HC RX MED GY IP 250 OP 250 PS 637: Performed by: STUDENT IN AN ORGANIZED HEALTH CARE EDUCATION/TRAINING PROGRAM

## 2025-01-17 PROCEDURE — 999N000157 HC STATISTIC RCP TIME EA 10 MIN

## 2025-01-17 PROCEDURE — 87493 C DIFF AMPLIFIED PROBE: CPT | Performed by: INTERNAL MEDICINE

## 2025-01-17 PROCEDURE — 250N000012 HC RX MED GY IP 250 OP 636 PS 637: Performed by: INTERNAL MEDICINE

## 2025-01-17 PROCEDURE — 250N000013 HC RX MED GY IP 250 OP 250 PS 637

## 2025-01-17 PROCEDURE — 250N000013 HC RX MED GY IP 250 OP 250 PS 637: Performed by: INTERNAL MEDICINE

## 2025-01-17 PROCEDURE — 99232 SBSQ HOSP IP/OBS MODERATE 35: CPT | Performed by: INTERNAL MEDICINE

## 2025-01-17 PROCEDURE — 94640 AIRWAY INHALATION TREATMENT: CPT | Mod: 76

## 2025-01-17 PROCEDURE — 120N000002 HC R&B MED SURG/OB UMMC

## 2025-01-17 PROCEDURE — 250N000009 HC RX 250: Performed by: INTERNAL MEDICINE

## 2025-01-17 RX ORDER — LOPERAMIDE HYDROCHLORIDE 2 MG/1
2 CAPSULE ORAL 4 TIMES DAILY PRN
Status: DISCONTINUED | OUTPATIENT
Start: 2025-01-17 | End: 2025-02-18 | Stop reason: HOSPADM

## 2025-01-17 RX ADMIN — LEVOTHYROXINE SODIUM 112 MCG: 112 TABLET ORAL at 08:43

## 2025-01-17 RX ADMIN — IPRATROPIUM BROMIDE AND ALBUTEROL SULFATE 3 ML: .5; 3 SOLUTION RESPIRATORY (INHALATION) at 12:10

## 2025-01-17 RX ADMIN — IPRATROPIUM BROMIDE AND ALBUTEROL SULFATE 3 ML: .5; 3 SOLUTION RESPIRATORY (INHALATION) at 01:38

## 2025-01-17 RX ADMIN — MORPHINE SULFATE 5 MG: 20 SOLUTION ORAL at 08:43

## 2025-01-17 RX ADMIN — IPRATROPIUM BROMIDE AND ALBUTEROL SULFATE 3 ML: .5; 3 SOLUTION RESPIRATORY (INHALATION) at 16:03

## 2025-01-17 RX ADMIN — Medication 1 HALF-TAB: at 14:39

## 2025-01-17 RX ADMIN — IPRATROPIUM BROMIDE AND ALBUTEROL SULFATE 3 ML: .5; 3 SOLUTION RESPIRATORY (INHALATION) at 20:40

## 2025-01-17 RX ADMIN — AMLODIPINE BESYLATE 5 MG: 5 TABLET ORAL at 21:53

## 2025-01-17 RX ADMIN — CARBIDOPA AND LEVODOPA 1 TABLET: 25; 100 TABLET ORAL at 08:43

## 2025-01-17 RX ADMIN — MORPHINE SULFATE 5 MG: 20 SOLUTION ORAL at 20:30

## 2025-01-17 RX ADMIN — GABAPENTIN 400 MG: 300 CAPSULE ORAL at 14:38

## 2025-01-17 RX ADMIN — CARBIDOPA AND LEVODOPA 1 TABLET: 25; 100 TABLET ORAL at 14:39

## 2025-01-17 RX ADMIN — Medication 1 HALF-TAB: at 08:43

## 2025-01-17 RX ADMIN — Medication 1 HALF-TAB: at 20:31

## 2025-01-17 RX ADMIN — MORPHINE SULFATE 5 MG: 20 SOLUTION ORAL at 14:39

## 2025-01-17 RX ADMIN — METOPROLOL SUCCINATE 50 MG: 50 TABLET, EXTENDED RELEASE ORAL at 08:43

## 2025-01-17 RX ADMIN — ROSUVASTATIN 10 MG: 10 TABLET, FILM COATED ORAL at 08:40

## 2025-01-17 RX ADMIN — POTASSIUM CHLORIDE 20 MEQ: 1500 TABLET, EXTENDED RELEASE ORAL at 08:43

## 2025-01-17 RX ADMIN — IPRATROPIUM BROMIDE AND ALBUTEROL SULFATE 3 ML: .5; 3 SOLUTION RESPIRATORY (INHALATION) at 08:58

## 2025-01-17 RX ADMIN — PREDNISONE 20 MG: 20 TABLET ORAL at 08:43

## 2025-01-17 RX ADMIN — IPRATROPIUM BROMIDE AND ALBUTEROL SULFATE 3 ML: .5; 3 SOLUTION RESPIRATORY (INHALATION) at 05:28

## 2025-01-17 RX ADMIN — CARBIDOPA AND LEVODOPA 1 TABLET: 25; 100 TABLET ORAL at 20:31

## 2025-01-17 RX ADMIN — GABAPENTIN 400 MG: 300 CAPSULE ORAL at 20:31

## 2025-01-17 RX ADMIN — PREDNISONE 20 MG: 20 TABLET ORAL at 18:15

## 2025-01-17 RX ADMIN — GABAPENTIN 400 MG: 300 CAPSULE ORAL at 08:40

## 2025-01-17 ASSESSMENT — ACTIVITIES OF DAILY LIVING (ADL)
ADLS_ACUITY_SCORE: 46

## 2025-01-17 NOTE — PROGRESS NOTES
"[facility]    Medicine Progress Note - Hospitalist Service, GOLD TEAM 17    Date of Admission:  1/4/2025    Assessment & Plan   : Patient is a 78 y/o man who has a past medical history significant for thyroid cancer with lung metastases, Parkinson's disease, neuropathy, glaucoma, osteoporosis, chronic pain, severe COPD, hyperlipidemia, obstructive sleep apnea and coronary artery disease. Patient presented on 04-Jan-2025 with a 7 day history of productive cough and a 1 day history of weakness. CT pulmonary angiogram was negative for pulmonary embolism. Patient appears to have COPD exacerbation but there is concern for community-acquired pneumonia as well. There is also concern that patient was in early sepsis on presentation.      Patient reportedly saw Pulmonology in Oct-2024 and COPD was moderately well-controlled at that time. Activity was apparently limited much more by his balance and parkinsonism than by respiratory status. Patient has antibiotics and prednisone available for outpatient COPD flares but apparently has not recently used this.      Patient apparently just entered hospice in the past week prior to hospital admission. Patient stated on admission that he was too weak and he and his daughter called their hospice agency and 911 was called and he was brought into the hospital. When provider asked on day of presentation whether patient wished for hospitalization and treatment of his potential infection, patient stated that he was seeking treatment and reported that his goal was to \"be able to do things for myself\". It was discussed that hospice typically focused on comfort rather than diagnostic testing and aggressive medical treatment. Patient acknowledged this but seemed unsure of what his true desires for his care are moving forward.      Patient was on ceftriaxone from 04-Jan-2025 to 08-Jan-2025. Patient was azithromycin 500 mg from 05-Jan-2025 to " 07-Jan-2025. Patient was on zosyn from 09-Jan-2025 to 14-Jan-2025.      P:  1.) Pneumonia:  - Patient initially was treated for a possible community-acquired pneumonia on admission with ceftriaxone and azithromycin.  - Patient subsequently was treated for possible hospital-acquired pneumonia with zosyn.  - Monitoring for recurrent infection.     2.) COPD exacerbation:  - Patient was on prednisone 40 mg daily from 04-Jan-2025 to 08-Jan-2025. Patient received prednisone 20 mg once on 09-Jan-2025 but was then on methylprednisolone 40 mg IV every 8 hours from 09-Jan-2025 yo 13-Jan-2025.   - Patient now on prednisone 20 mg twice daily. Anticipate eventual steroid taper.   - Patient on azithromycin 500 mg every other day for now.     3.) Chronic hypoxemic respiratory failure:  - Patient usually on 1-2 litres per minute of supplemental oxygen at baseline.  - Patient patient's daughter, patient may be placed on BiPAP if needed.     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  - Further surveillance as outpatient.     5.) Right upper lobe lung mass s/p radiation therapy with possible radiation pneumonitis:  - Patient had previous VATS biopsy in Feb-2022 that was non-diagnostic.     6.) Post-surgical hypothyroidism:  - Patient on levothyroxine 112 mcg daily.     7.) Rheumatoid arthritis with positive rheumatoid factor:  - Per chart review, patient had once been on methotrexate and prednisone but had stopped these medications a few years ago.  - Patient to f/u as outpatient.     8.) Coronary artery disease with past MI s/p stent placement:  - Patient usually on aspirin and crestor but these are currently on hold.  - Patient continuing metoprolol succinate 50 mg daily.      9.) Hypertension:  - Monitoring on metoprolol succinate.  - Patient had been on lasix but this is on hold in light of hypernatremia.     10.) Hypernatremia, improved:  - Monitoring off  lasix.     11.) Hyperlipidemia:  - Patient usually on crestor but this is on hold.     12.) Parkinson's disease:  - Patient on sinemet three times a day.     13.) Neuropathy, chronic pain syndrome:  - Patient currently on gabapentin 400 mg three times a day.  - Patient usually on methocarbamol but this is currently on hold.     14.) Glaucoma:  - Patient not currently on medication for this.  - Patient to f/u with Ophthalmology as outpatient if further treatment is desired.     15.) Osteoporosis:  - Patient on prolia as outpatient.     16.) Acute thrombocytopenia:  - Monitoring for bleeding.     17.) History of subarachnoid hemorrhage in 2023:  - No active intervention indicated.     18.) Moderate malnutrition in the context of acute vs. chronic illness:  - Supplementing as able.      19.) Discharge planning for hospice facility.             Diet: Snacks/Supplements Adult: Ensure Enlive; With Meals  Regular Diet Adult Thin Liquids (level 0)    Mir Catheter: Not present  Lines: None     Cardiac Monitoring: None  Code Status: No CPR- Do NOT Intubate      Clinically Significant Risk Factors                   # Hypertension: Noted on problem list     # Acute Hypoxic Respiratory Failure: Documented O2 saturation < 90%. Continue supplemental oxygen as needed          # Moderate Malnutrition: based on nutrition assessment    # Financial/Environmental Concerns:           Social Drivers of Health    Tobacco Use: Medium Risk (10/18/2024)    Patient History     Smoking Tobacco Use: Former     Smokeless Tobacco Use: Never   Physical Activity: Inactive (10/14/2024)    Exercise Vital Sign     Days of Exercise per Week: 0 days     Minutes of Exercise per Session: 0 min   Stress: Stress Concern Present (10/14/2024)    [employer] - Occupational Stress Questionnaire     Feeling of Stress : Very much   Social Connections: Unknown (10/14/2024)    Social Connection and Isolation Panel [NHANES]      Frequency of Social Gatherings with Friends and Family: More than three times a week          Disposition Plan     Medically Ready for Discharge: Anticipated in 2-4 Days             Gonzalez Watt MD  Hospitalist Service, GOLD TEAM 17  [facility]  Securely message with [employer] (more info)  Text page via [employer] Paging/Directory   See signed in provider for up to date coverage information  ______________________________________________________________________    Interval History   Patient noted continue dyspnea. Patient noted diarrhea improved.    Physical Exam   Vital Signs: Temp: 97.8  F (36.6  C) Temp src: Oral BP: 139/85 Pulse: 78   Resp: 20 SpO2: 95 % O2 Device: Nasal cannula Oxygen Delivery: 2 LPM  Weight: 162 lbs 1.6 oz    General: Patient dyspneic with speaking.

## 2025-01-17 NOTE — PLAN OF CARE
Patient A/Ox4. VSS. Denies CP, dizziness/LH. Dyspnea with exertion. +fl/BS. Voiding well in bathroom. CMS intact. Dressing to L hand CDI. Tolerating regular diet without NV. Activity level is good, pt resting in bed, uses bathroom occasionally. IV SL. Pain rated as comfortably managed throughout shift, managed with oral morphine. SW following along for placement. Patient has demonstrated ability to call appropriately. Patient is resting with call light within reach. Will continue to monitor.

## 2025-01-17 NOTE — PROGRESS NOTES
Care Management Follow Up    Length of Stay (days): 13    Expected Discharge Date: 01/20/25     Concerns to be Addressed: discharge planning     Patient plan of care discussed at interdisciplinary rounds: Yes    Anticipated Discharge Disposition: Home, Hospice         Anticipated Discharge Services: Other (see comment) (current on home hospice)  Anticipated Discharge DME: None    Patient/family educated on Medicare website which has current facility and service quality ratings:    Education Provided on the Discharge Plan: Yes  Patient/Family in Agreement with the Plan:      Referrals Placed by CM/SW: Homecare (currently on home hospice)  Private pay costs discussed: Not applicable    Discussed  Partnership in Safe Discharge Planning  document with patient/family: Yes:     Handoff Completed: No, handoff not indicated or clinically appropriate    Additional Information:  SW  spoke with [facility]. SW provided updated that Pt has Enteric and is on isolation peculations.  [facility] then identified that  they are unable to accept Pt on Enteric. JARAD provided update on VM to Pt's daughter.  JARAD is waiting for Pt's  MA to become active/pending and  then will  make LTC  with hospice referrals  in collaboration with Pt and Pt's family.     Next Steps:   SW/RNCC to follow for safe discharge placement and planning.      Antonio Houston, LICSW  [facility] [place] [facility]   Ph: [phone]

## 2025-01-17 NOTE — PLAN OF CARE
/76 (BP Location: Left arm)   Pulse 73   Temp 98  F (36.7  C) (Oral)   Resp 16   Wt 72.8 kg (160 lb 6.4 oz)   SpO2 94%   BMI 24.03 kg/m    O2>90% 2L NC.    Output: Voids spontaneously, denies difficulties    Last BM: 1/16/25   Activity: Ax1, w/ walker and GB   Up for meals? Yes   Skin: Scattered bilateral arm bruising, delicate skin   Pain: Controled with scheduled morphine   CMS: A&Ox4, n/t in all extremities   Dressing: Sacral mepilex in place, CDI   Diet: Reg, denies nausea and vomiting    LDA: R PIV, SL   Plan: Continue with plan of care   Additional Info:  Lab ordered to r/o C Diff

## 2025-01-17 NOTE — PLAN OF CARE
Goal Outcome Evaluation:      Plan of Care Reviewed With: patient    Overall Patient Progress: no changeOverall Patient Progress: no change         VS: /85 (BP Location: Left arm)   Pulse 78   Temp 97.8  F (36.6  C) (Oral)   Resp 20   Wt 73.5 kg (162 lb 1.6 oz)   SpO2 94%   BMI 24.29 kg/m     O2: SpO2>90% on 2L via NC, SOB on exertion, denies chest pain.   Output: Voids spontaneously and adequately    Last BM: 1/17/25   Activity: Ax1 with gait belt and walker    Skin: Scattered bruising on BUE  Thin delicate skin   Pain: Managed with scheduled morphine   CMS: A&O x 4, reports n/t in all extremities.    Dressing: CDI   Diet: Regular   LDA: R PIV SL   Equipment: IV pole, call light , walker, gait belt and personal belongings   Plan: TBD   Additional Info: Enteric precaution to rule out C-diff.

## 2025-01-18 PROCEDURE — 250N000009 HC RX 250: Performed by: INTERNAL MEDICINE

## 2025-01-18 PROCEDURE — 250N000013 HC RX MED GY IP 250 OP 250 PS 637: Performed by: STUDENT IN AN ORGANIZED HEALTH CARE EDUCATION/TRAINING PROGRAM

## 2025-01-18 PROCEDURE — 99232 SBSQ HOSP IP/OBS MODERATE 35: CPT | Performed by: INTERNAL MEDICINE

## 2025-01-18 PROCEDURE — 999N000157 HC STATISTIC RCP TIME EA 10 MIN

## 2025-01-18 PROCEDURE — 250N000013 HC RX MED GY IP 250 OP 250 PS 637: Performed by: PHYSICIAN ASSISTANT

## 2025-01-18 PROCEDURE — 120N000002 HC R&B MED SURG/OB UMMC

## 2025-01-18 PROCEDURE — 250N000013 HC RX MED GY IP 250 OP 250 PS 637

## 2025-01-18 PROCEDURE — 250N000009 HC RX 250

## 2025-01-18 PROCEDURE — 250N000013 HC RX MED GY IP 250 OP 250 PS 637: Performed by: INTERNAL MEDICINE

## 2025-01-18 PROCEDURE — 94640 AIRWAY INHALATION TREATMENT: CPT | Mod: 76

## 2025-01-18 RX ADMIN — GABAPENTIN 400 MG: 300 CAPSULE ORAL at 08:36

## 2025-01-18 RX ADMIN — IPRATROPIUM BROMIDE AND ALBUTEROL SULFATE 3 ML: .5; 3 SOLUTION RESPIRATORY (INHALATION) at 08:45

## 2025-01-18 RX ADMIN — MORPHINE SULFATE 5 MG: 20 SOLUTION ORAL at 14:27

## 2025-01-18 RX ADMIN — Medication 0.25 MG: at 04:21

## 2025-01-18 RX ADMIN — Medication 1 HALF-TAB: at 14:27

## 2025-01-18 RX ADMIN — POTASSIUM CHLORIDE 20 MEQ: 1500 TABLET, EXTENDED RELEASE ORAL at 08:36

## 2025-01-18 RX ADMIN — GABAPENTIN 400 MG: 300 CAPSULE ORAL at 14:26

## 2025-01-18 RX ADMIN — MORPHINE SULFATE 5 MG: 20 SOLUTION ORAL at 08:39

## 2025-01-18 RX ADMIN — CARBIDOPA AND LEVODOPA 1 TABLET: 25; 100 TABLET ORAL at 19:40

## 2025-01-18 RX ADMIN — CARBIDOPA AND LEVODOPA 1 TABLET: 25; 100 TABLET ORAL at 08:36

## 2025-01-18 RX ADMIN — IPRATROPIUM BROMIDE AND ALBUTEROL SULFATE 3 ML: .5; 3 SOLUTION RESPIRATORY (INHALATION) at 01:19

## 2025-01-18 RX ADMIN — IPRATROPIUM BROMIDE AND ALBUTEROL SULFATE 3 ML: .5; 3 SOLUTION RESPIRATORY (INHALATION) at 19:53

## 2025-01-18 RX ADMIN — Medication 1 HALF-TAB: at 19:40

## 2025-01-18 RX ADMIN — IPRATROPIUM BROMIDE AND ALBUTEROL SULFATE 3 ML: .5; 3 SOLUTION RESPIRATORY (INHALATION) at 13:11

## 2025-01-18 RX ADMIN — Medication 1 HALF-TAB: at 08:36

## 2025-01-18 RX ADMIN — AZITHROMYCIN DIHYDRATE 500 MG: 250 TABLET ORAL at 08:36

## 2025-01-18 RX ADMIN — ROSUVASTATIN 10 MG: 10 TABLET, FILM COATED ORAL at 08:36

## 2025-01-18 RX ADMIN — CARBIDOPA AND LEVODOPA 1 TABLET: 25; 100 TABLET ORAL at 14:27

## 2025-01-18 RX ADMIN — MORPHINE SULFATE 5 MG: 20 SOLUTION ORAL at 19:40

## 2025-01-18 RX ADMIN — GABAPENTIN 400 MG: 300 CAPSULE ORAL at 19:40

## 2025-01-18 RX ADMIN — LEVOTHYROXINE SODIUM 112 MCG: 112 TABLET ORAL at 08:36

## 2025-01-18 RX ADMIN — METOPROLOL SUCCINATE 50 MG: 50 TABLET, EXTENDED RELEASE ORAL at 08:36

## 2025-01-18 RX ADMIN — IPRATROPIUM BROMIDE AND ALBUTEROL SULFATE 3 ML: .5; 3 SOLUTION RESPIRATORY (INHALATION) at 16:18

## 2025-01-18 RX ADMIN — AMLODIPINE BESYLATE 5 MG: 5 TABLET ORAL at 22:42

## 2025-01-18 ASSESSMENT — ACTIVITIES OF DAILY LIVING (ADL)
ADLS_ACUITY_SCORE: 46

## 2025-01-18 NOTE — PROGRESS NOTES
"[facility]    Medicine Progress Note - Hospitalist Service, GOLD TEAM 17    Date of Admission:  1/4/2025    Assessment & Plan   A: Patient is a 78 y/o man who has a past medical history significant for thyroid cancer with lung metastases, Parkinson's disease, neuropathy, glaucoma, osteoporosis, chronic pain, severe COPD, hyperlipidemia, obstructive sleep apnea and coronary artery disease. Patient presented on 04-Jan-2025 with a 7 day history of productive cough and a 1 day history of weakness. CT pulmonary angiogram was negative for pulmonary embolism. Patient appears to have COPD exacerbation but there is concern for community-acquired pneumonia as well. There is also concern that patient was in early sepsis on presentation.      Patient reportedly saw Pulmonology in Oct-2024 and COPD was moderately well-controlled at that time. Activity was apparently limited much more by his balance and parkinsonism than by respiratory status. Patient has antibiotics and prednisone available for outpatient COPD flares but apparently has not recently used this.      Patient apparently just entered hospice in the past week prior to hospital admission. Patient stated on admission that he was too weak and he and his daughter called their hospice agency and 911 was called and he was brought into the hospital. When provider asked on day of presentation whether patient wished for hospitalization and treatment of his potential infection, patient stated that he was seeking treatment and reported that his goal was to \"be able to do things for myself\". It was discussed that hospice typically focused on comfort rather than diagnostic testing and aggressive medical treatment. Patient acknowledged this but seemed unsure of what his true desires for his care are moving forward.      Patient was on ceftriaxone from 04-Jan-2025 to 08-Jan-2025. Patient was azithromycin 500 mg from 05-Jan-2025 to " 07-Jan-2025. Patient was on zosyn from 09-Jan-2025 to 14-Jan-2025.      P:  1.) Pneumonia:  - Patient initially was treated for a possible community-acquired pneumonia on admission with ceftriaxone and azithromycin.  - Patient subsequently was treated for possible hospital-acquired pneumonia with zosyn.  - Monitoring for recurrent infection.     2.) COPD exacerbation:  - Patient was on prednisone 40 mg daily from 04-Jan-2025 to 08-Jan-2025. Patient received prednisone 20 mg once on 09-Jan-2025 but was then on methylprednisolone 40 mg IV every 8 hours from 09-Jan-2025 yo 13-Jan-2025.   - Patient now on prednisone 20 mg twice daily. Anticipate eventual steroid taper.   - Patient on azithromycin 500 mg every other day for now.     3.) Chronic hypoxemic respiratory failure:  - Patient usually on 1-2 litres per minute of supplemental oxygen at baseline.  - Patient patient's daughter, patient may be placed on BiPAP if needed.     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  - Further surveillance as outpatient.     5.) Right upper lobe lung mass s/p radiation therapy with possible radiation pneumonitis:  - Patient had previous VATS biopsy in Feb-2022 that was non-diagnostic.     6.) Post-surgical hypothyroidism:  - Patient on levothyroxine 112 mcg daily.     7.) Rheumatoid arthritis with positive rheumatoid factor:  - Per chart review, patient had once been on methotrexate and prednisone but had stopped these medications a few years ago.  - Patient to f/u as outpatient.     8.) Coronary artery disease with past MI s/p stent placement:  - Patient usually on aspirin and crestor but these are currently on hold.  - Patient continuing metoprolol succinate 50 mg daily.      9.) Hypertension:  - Monitoring on metoprolol succinate.  - Patient had been on lasix but this is on hold in light of hypernatremia.     10.) Hypernatremia, improved:  - Monitoring off  lasix.     11.) Hyperlipidemia:  - Patient usually on crestor but this is on hold.     12.) Parkinson's disease:  - Patient on sinemet three times a day.     13.) Neuropathy, chronic pain syndrome:  - Patient currently on gabapentin 400 mg three times a day.  - Patient usually on methocarbamol but this is currently on hold.     14.) Glaucoma:  - Patient not currently on medication for this.  - Patient to f/u with Ophthalmology as outpatient if further treatment is desired.     15.) Osteoporosis:  - Patient on prolia as outpatient.     16.) Acute thrombocytopenia:  - Monitoring for bleeding.     17.) History of subarachnoid hemorrhage in 2023:  - No active intervention indicated.     18.) Moderate malnutrition in the context of acute vs. chronic illness:  - Supplementing as able.      19.) Discharge planning for hospice facility.             Diet: Snacks/Supplements Adult: Ensure Enlive; With Meals  Regular Diet Adult Thin Liquids (level 0)    Mir Catheter: Not present  Lines: None     Cardiac Monitoring: None  Code Status: No CPR- Do NOT Intubate      Clinically Significant Risk Factors                   # Hypertension: Noted on problem list     # Acute Hypoxic Respiratory Failure: Documented O2 saturation < 90%. Continue supplemental oxygen as needed          # Moderate Malnutrition: based on nutrition assessment    # Financial/Environmental Concerns:           Social Drivers of Health    Tobacco Use: Medium Risk (10/18/2024)    Patient History     Smoking Tobacco Use: Former     Smokeless Tobacco Use: Never   Physical Activity: Inactive (10/14/2024)    Exercise Vital Sign     Days of Exercise per Week: 0 days     Minutes of Exercise per Session: 0 min   Stress: Stress Concern Present (10/14/2024)    [employer] - Occupational Stress Questionnaire     Feeling of Stress : Very much   Social Connections: Unknown (10/14/2024)    Social Connection and Isolation Panel [NHANES]      Frequency of Social Gatherings with Friends and Family: More than three times a week          Disposition Plan     Medically Ready for Discharge: Anticipated in 2-4 Days             Gonzalez Watt MD  Hospitalist Service, GOLD TEAM 17  [facility]  Securely message with [employer] (more info)  Text page via [employer] Paging/Directory   See signed in provider for up to date coverage information  ______________________________________________________________________    Interval History   Patient noted still having dyspnea. Patient noted no new problems.    Physical Exam   Vital Signs: Temp: 98.7  F (37.1  C) Temp src: Oral BP: (!) 149/77 Pulse: 63   Resp: 18 SpO2: 98 % O2 Device: Nasal cannula Oxygen Delivery: 2 LPM  Weight: 162 lbs 1.6 oz    General: Patient dyspneic, especially when speaking.

## 2025-01-18 NOTE — PROGRESS NOTES
Assumed patient care around [id number].      Patient alert and oriented x4. Able to make needs known, call light within reach. Patient has dyspnea on exertion. Patient is on 2L of oxygen via NC, sats >90%. Rating back 5/10, patient declined tylenol, and robaxin, patient has scheduled morphine. Patient resting comfortably in bed.      Continue with plan of care

## 2025-01-18 NOTE — PLAN OF CARE
Goal Outcome Evaluation:      Plan of Care Reviewed With: patient    Overall Patient Progress: improvingOverall Patient Progress: improving         VS: BP (!) 149/77 (BP Location: Left arm)   Pulse 63   Temp 98.7  F (37.1  C) (Oral)   Resp 18   Wt 73.5 kg (162 lb 1.6 oz)   SpO2 98%   BMI 24.29 kg/m     O2: SpO2>90% on 2L via NC, SOB on exertion, denies chest pain.   Output: oids spontaneously and adequately   Last BM: 1/18/25   Activity: Ax1 with gait belt and walker    Skin: Scattered bruising on BUE  Thin delicate skin   Pain: Managed with scheduled morphine   CMS: A&O x 4, reports n/t in all extremities.   Dressing: CDI   Diet: Regular   LDA: R PIV SL   Equipment: IV pole, call light , walker, gait belt and personal belongings   Plan: TBD   Additional Info:

## 2025-01-18 NOTE — PROGRESS NOTES
A/Ox's 4. Pt rated pain as tolerable. Morphine given for pain control.  CMS to baseline. Pt gets shortness of breath with activity. RT saw patient and stated pt appears Fluid overloaded, wondering if pt had Lasix to give and recommended a chest X-Ray. Dr. Ignacio messaged and no new orders at this time. Pt's oxygen spot checked at 94 on 2L NC.  Up with SBA to the bathroom.  Resting in bed at this time with call light in reach. Able to make needs known

## 2025-01-18 NOTE — PLAN OF CARE
Goal Outcome Evaluation:      Plan of Care Reviewed With: patient    Overall Patient Progress: improvingOverall Patient Progress: improving    VS: VSS   O2: SpO2>90% on 2L via NC, SOB on exertion, denies chest pain.   Output: Voids spontaneously and adequately    Last BM: 1/18/25   Activity: Ax1 with gait belt and walker    Skin: Scattered bruising on BUE  Thin delicate skin   Pain: Managed with pillow support and ativan    CMS: A&O x 4, reports n/t in all extremities.    Dressing: CDI   Diet: Regular   LDA: R PIV SL   Equipment: IV pole, call light , walker, gait belt and personal belongings   Plan: TBD   Additional Info: Enteric precaution

## 2025-01-19 PROCEDURE — 250N000009 HC RX 250

## 2025-01-19 PROCEDURE — 250N000013 HC RX MED GY IP 250 OP 250 PS 637: Performed by: INTERNAL MEDICINE

## 2025-01-19 PROCEDURE — 250N000009 HC RX 250: Performed by: INTERNAL MEDICINE

## 2025-01-19 PROCEDURE — 250N000013 HC RX MED GY IP 250 OP 250 PS 637: Performed by: PHYSICIAN ASSISTANT

## 2025-01-19 PROCEDURE — 999N000157 HC STATISTIC RCP TIME EA 10 MIN

## 2025-01-19 PROCEDURE — 250N000013 HC RX MED GY IP 250 OP 250 PS 637

## 2025-01-19 PROCEDURE — 99232 SBSQ HOSP IP/OBS MODERATE 35: CPT | Performed by: INTERNAL MEDICINE

## 2025-01-19 PROCEDURE — 120N000002 HC R&B MED SURG/OB UMMC

## 2025-01-19 PROCEDURE — 250N000013 HC RX MED GY IP 250 OP 250 PS 637: Performed by: STUDENT IN AN ORGANIZED HEALTH CARE EDUCATION/TRAINING PROGRAM

## 2025-01-19 PROCEDURE — 94640 AIRWAY INHALATION TREATMENT: CPT | Mod: 76

## 2025-01-19 RX ADMIN — CARBIDOPA AND LEVODOPA 1 TABLET: 25; 100 TABLET ORAL at 14:16

## 2025-01-19 RX ADMIN — GABAPENTIN 400 MG: 300 CAPSULE ORAL at 19:22

## 2025-01-19 RX ADMIN — IPRATROPIUM BROMIDE AND ALBUTEROL SULFATE 3 ML: .5; 3 SOLUTION RESPIRATORY (INHALATION) at 12:12

## 2025-01-19 RX ADMIN — CARBIDOPA AND LEVODOPA 1 TABLET: 25; 100 TABLET ORAL at 19:22

## 2025-01-19 RX ADMIN — IPRATROPIUM BROMIDE AND ALBUTEROL SULFATE 3 ML: .5; 3 SOLUTION RESPIRATORY (INHALATION) at 08:56

## 2025-01-19 RX ADMIN — CARBIDOPA AND LEVODOPA 1 TABLET: 25; 100 TABLET ORAL at 08:35

## 2025-01-19 RX ADMIN — MORPHINE SULFATE 5 MG: 20 SOLUTION ORAL at 08:35

## 2025-01-19 RX ADMIN — GABAPENTIN 400 MG: 300 CAPSULE ORAL at 08:35

## 2025-01-19 RX ADMIN — Medication 1 HALF-TAB: at 08:35

## 2025-01-19 RX ADMIN — ROSUVASTATIN 10 MG: 10 TABLET, FILM COATED ORAL at 08:35

## 2025-01-19 RX ADMIN — LEVOTHYROXINE SODIUM 112 MCG: 112 TABLET ORAL at 08:35

## 2025-01-19 RX ADMIN — Medication 1 HALF-TAB: at 19:22

## 2025-01-19 RX ADMIN — MORPHINE SULFATE 5 MG: 20 SOLUTION ORAL at 14:17

## 2025-01-19 RX ADMIN — IPRATROPIUM BROMIDE AND ALBUTEROL SULFATE 3 ML: .5; 3 SOLUTION RESPIRATORY (INHALATION) at 06:07

## 2025-01-19 RX ADMIN — IPRATROPIUM BROMIDE AND ALBUTEROL SULFATE 3 ML: .5; 3 SOLUTION RESPIRATORY (INHALATION) at 20:22

## 2025-01-19 RX ADMIN — Medication 1 HALF-TAB: at 14:16

## 2025-01-19 RX ADMIN — IPRATROPIUM BROMIDE AND ALBUTEROL SULFATE 3 ML: .5; 3 SOLUTION RESPIRATORY (INHALATION) at 16:44

## 2025-01-19 RX ADMIN — GABAPENTIN 400 MG: 300 CAPSULE ORAL at 14:16

## 2025-01-19 RX ADMIN — METOPROLOL SUCCINATE 50 MG: 50 TABLET, EXTENDED RELEASE ORAL at 08:35

## 2025-01-19 RX ADMIN — AMLODIPINE BESYLATE 5 MG: 5 TABLET ORAL at 21:53

## 2025-01-19 RX ADMIN — MORPHINE SULFATE 5 MG: 20 SOLUTION ORAL at 19:22

## 2025-01-19 ASSESSMENT — ACTIVITIES OF DAILY LIVING (ADL)
ADLS_ACUITY_SCORE: 46

## 2025-01-19 NOTE — PLAN OF CARE
Goal Outcome Evaluation:      Plan of Care Reviewed With: patient    Overall Patient Progress: improvingOverall Patient Progress: improving         VS: BP (!) 147/78 (BP Location: Right arm)   Pulse 78   Temp 97.5  F (36.4  C) (Oral)   Resp 18   Wt 73.5 kg (162 lb 1.6 oz)   SpO2 94%   BMI 24.29 kg/m     O2: SpO2>90% on 2L via NC, ACEVES, denies chest pain.   Output: Voids spontaneously and adequately   Last BM: 1/18/25   Activity: Ax1 with gait belt and walker   Skin: Scattered bruising on BUE  Thin delicate skin   Pain: Managed with scheduled morphine   CMS: A&O x 4, reports n/t in all extremities.   Dressing: R PIV - CDI   Diet: Regular   LDA: R PIV SL   Equipment: IV pole, call light, walker, gait belt, and personal belongings   Plan: TBD   Additional Info:

## 2025-01-19 NOTE — PLAN OF CARE
Pt remains A/O x4. Pt continues to have elevated BPs at baseline, otherwise vitally stable. Pt continues to deny chest pain & N/V. Pt continues to be SOB with activity and when talking for long periods, pt requested PRN neb at 0550 for increased WOB & SOB at rest. Pt continues to be assist x1 with walker and refuses usage of gait belt. Pt continues to be on O2 at 2 LPM via NC. Pt continues to have chronic back pain managed per MAR. Pt uses call light appropriately and able to make needs known. No acute changes overnight.     Goal Outcome Evaluation:    Plan of Care Reviewed With: patient    Overall Patient Progress: improving

## 2025-01-19 NOTE — PLAN OF CARE
Goal Outcome Evaluation:      Plan of Care Reviewed With: patient    Overall Patient Progress: improving    Outcome Evaluation: A&Ox4 with VSS on 2 LPM NC. Pt reports ACEVES when ambulating to bathroom, but denies dyspnea at rest, infrequent productive cough. Pain controlled with scheduled morphine. IM following, plan TBD

## 2025-01-19 NOTE — PROGRESS NOTES
"[facility]    Medicine Progress Note - Hospitalist Service, GOLD TEAM 17    Date of Admission:  1/4/2025    Assessment & Plan   A: Patient is a 78 y/o man who has a past medical history significant for thyroid cancer with lung metastases, Parkinson's disease, neuropathy, glaucoma, osteoporosis, chronic pain, severe COPD, hyperlipidemia, obstructive sleep apnea and coronary artery disease. Patient presented on 04-Jan-2025 with a 7 day history of productive cough and a 1 day history of weakness. CT pulmonary angiogram was negative for pulmonary embolism. Patient appears to have COPD exacerbation but there is concern for community-acquired pneumonia as well. There is also concern that patient was in early sepsis on presentation.      Patient reportedly saw Pulmonology in Oct-2024 and COPD was moderately well-controlled at that time. Activity was apparently limited much more by his balance and parkinsonism than by respiratory status. Patient has antibiotics and prednisone available for outpatient COPD flares but apparently has not recently used this.      Patient apparently just entered hospice in the past week prior to hospital admission. Patient stated on admission that he was too weak and he and his daughter called their hospice agency and 911 was called and he was brought into the hospital. When provider asked on day of presentation whether patient wished for hospitalization and treatment of his potential infection, patient stated that he was seeking treatment and reported that his goal was to \"be able to do things for myself\". It was discussed that hospice typically focused on comfort rather than diagnostic testing and aggressive medical treatment. Patient acknowledged this but seemed unsure of what his true desires for his care are moving forward.      Patient was on ceftriaxone from 04-Jan-2025 to 08-Jan-2025. Patient was azithromycin 500 mg from 05-Jan-2025 to " 07-Jan-2025. Patient was on zosyn from 09-Jan-2025 to 14-Jan-2025.      P:  1.) Pneumonia:  - Patient initially was treated for a possible community-acquired pneumonia on admission with ceftriaxone and azithromycin.  - Patient subsequently was treated for possible hospital-acquired pneumonia with zosyn.  - Monitoring for recurrent infection.     2.) COPD exacerbation:  - Patient was on prednisone 40 mg daily from 04-Jan-2025 to 08-Jan-2025. Patient received prednisone 20 mg once on 09-Jan-2025 but was then on methylprednisolone 40 mg IV every 8 hours from 09-Jan-2025 yo 13-Jan-2025.   - Patient now on prednisone 20 mg twice daily. Anticipate eventual steroid taper.   - Patient on azithromycin 500 mg every other day for now.     3.) Chronic hypoxemic respiratory failure:  - Patient usually on 1-2 litres per minute of supplemental oxygen at baseline.  - Patient patient's daughter, patient may be placed on BiPAP if needed.     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  - Further surveillance as outpatient.     5.) Right upper lobe lung mass s/p radiation therapy with possible radiation pneumonitis:  - Patient had previous VATS biopsy in Feb-2022 that was non-diagnostic.     6.) Post-surgical hypothyroidism:  - Patient on levothyroxine 112 mcg daily.     7.) Rheumatoid arthritis with positive rheumatoid factor:  - Per chart review, patient had once been on methotrexate and prednisone but had stopped these medications a few years ago.  - Patient to f/u as outpatient.     8.) Coronary artery disease with past MI s/p stent placement:  - Patient usually on aspirin and crestor but these are currently on hold.  - Patient continuing metoprolol succinate 50 mg daily.      9.) Hypertension:  - Monitoring on metoprolol succinate.  - Patient had been on lasix but this is on hold in light of hypernatremia.     10.) Hypernatremia, improved:  - Monitoring off  lasix.     11.) Hyperlipidemia:  - Patient usually on crestor but this is on hold.     12.) Parkinson's disease:  - Patient on sinemet three times a day.     13.) Neuropathy, chronic pain syndrome:  - Patient currently on gabapentin 400 mg three times a day.  - Patient usually on methocarbamol but this is currently on hold.     14.) Glaucoma:  - Patient not currently on medication for this.  - Patient to f/u with Ophthalmology as outpatient if further treatment is desired.     15.) Osteoporosis:  - Patient on prolia as outpatient.     16.) Acute thrombocytopenia:  - Monitoring for bleeding.     17.) History of subarachnoid hemorrhage in 2023:  - No active intervention indicated.     18.) Moderate malnutrition in the context of acute vs. chronic illness:  - Supplementing as able.      19.) Discharge planning for hospice facility.          Diet: Snacks/Supplements Adult: Ensure Enlive; With Meals  Regular Diet Adult Thin Liquids (level 0)    Mir Catheter: Not present  Lines: None     Cardiac Monitoring: None  Code Status: No CPR- Do NOT Intubate      Clinically Significant Risk Factors                   # Hypertension: Noted on problem list     # Acute Hypoxic Respiratory Failure: Documented O2 saturation < 90%. Continue supplemental oxygen as needed          # Moderate Malnutrition: based on nutrition assessment    # Financial/Environmental Concerns:           Social Drivers of Health    Tobacco Use: Medium Risk (10/18/2024)    Patient History     Smoking Tobacco Use: Former     Smokeless Tobacco Use: Never   Physical Activity: Inactive (10/14/2024)    Exercise Vital Sign     Days of Exercise per Week: 0 days     Minutes of Exercise per Session: 0 min   Stress: Stress Concern Present (10/14/2024)    [employer] - Occupational Stress Questionnaire     Feeling of Stress : Very much   Social Connections: Unknown (10/14/2024)    Social Connection and Isolation Panel [NHANES]     Frequency  of Social Gatherings with Friends and Family: More than three times a week          Disposition Plan     Medically Ready for Discharge: Anticipated in 2-4 Days             Gonzalez Watt MD  Hospitalist Service, GOLD TEAM 17  [facility]  Securely message with [facility] (more info)  Text page via [employer] Paging/Directory   See signed in provider for up to date coverage information  ______________________________________________________________________    Interval History   Patient noted pain controlled and noted no new problems.    Physical Exam   Vital Signs: Temp: 97.5  F (36.4  C) Temp src: Oral BP: (!) 147/78 Pulse: 78   Resp: 18 SpO2: 94 % O2 Device: Nasal cannula Oxygen Delivery: 2 LPM  Weight: 162 lbs 1.6 oz    General: Patient somewhat dyspneic with speaking.

## 2025-01-20 PROCEDURE — 94640 AIRWAY INHALATION TREATMENT: CPT | Mod: 76

## 2025-01-20 PROCEDURE — 250N000013 HC RX MED GY IP 250 OP 250 PS 637: Performed by: STUDENT IN AN ORGANIZED HEALTH CARE EDUCATION/TRAINING PROGRAM

## 2025-01-20 PROCEDURE — 250N000009 HC RX 250

## 2025-01-20 PROCEDURE — 99232 SBSQ HOSP IP/OBS MODERATE 35: CPT | Performed by: INTERNAL MEDICINE

## 2025-01-20 PROCEDURE — 250N000013 HC RX MED GY IP 250 OP 250 PS 637: Performed by: INTERNAL MEDICINE

## 2025-01-20 PROCEDURE — 94640 AIRWAY INHALATION TREATMENT: CPT

## 2025-01-20 PROCEDURE — 999N000157 HC STATISTIC RCP TIME EA 10 MIN

## 2025-01-20 PROCEDURE — 120N000002 HC R&B MED SURG/OB UMMC

## 2025-01-20 PROCEDURE — 250N000013 HC RX MED GY IP 250 OP 250 PS 637

## 2025-01-20 PROCEDURE — 250N000013 HC RX MED GY IP 250 OP 250 PS 637: Performed by: PHYSICIAN ASSISTANT

## 2025-01-20 RX ADMIN — GABAPENTIN 400 MG: 300 CAPSULE ORAL at 20:13

## 2025-01-20 RX ADMIN — GABAPENTIN 400 MG: 300 CAPSULE ORAL at 14:21

## 2025-01-20 RX ADMIN — Medication 1 HALF-TAB: at 14:18

## 2025-01-20 RX ADMIN — CARBIDOPA AND LEVODOPA 1 TABLET: 25; 100 TABLET ORAL at 20:13

## 2025-01-20 RX ADMIN — METOPROLOL SUCCINATE 50 MG: 50 TABLET, EXTENDED RELEASE ORAL at 07:49

## 2025-01-20 RX ADMIN — MORPHINE SULFATE 5 MG: 20 SOLUTION ORAL at 14:17

## 2025-01-20 RX ADMIN — GABAPENTIN 400 MG: 300 CAPSULE ORAL at 07:45

## 2025-01-20 RX ADMIN — CARBIDOPA AND LEVODOPA 1 TABLET: 25; 100 TABLET ORAL at 07:45

## 2025-01-20 RX ADMIN — AZITHROMYCIN DIHYDRATE 500 MG: 250 TABLET ORAL at 10:13

## 2025-01-20 RX ADMIN — CARBIDOPA AND LEVODOPA 1 TABLET: 25; 100 TABLET ORAL at 14:18

## 2025-01-20 RX ADMIN — MORPHINE SULFATE 5 MG: 20 SOLUTION ORAL at 20:13

## 2025-01-20 RX ADMIN — ROSUVASTATIN 10 MG: 10 TABLET, FILM COATED ORAL at 07:45

## 2025-01-20 RX ADMIN — IPRATROPIUM BROMIDE AND ALBUTEROL SULFATE 3 ML: .5; 3 SOLUTION RESPIRATORY (INHALATION) at 11:55

## 2025-01-20 RX ADMIN — LEVOTHYROXINE SODIUM 112 MCG: 112 TABLET ORAL at 07:45

## 2025-01-20 RX ADMIN — MORPHINE SULFATE 10 MG: 20 SOLUTION ORAL at 17:08

## 2025-01-20 RX ADMIN — IPRATROPIUM BROMIDE AND ALBUTEROL SULFATE 3 ML: .5; 3 SOLUTION RESPIRATORY (INHALATION) at 07:49

## 2025-01-20 RX ADMIN — Medication 1 HALF-TAB: at 20:13

## 2025-01-20 RX ADMIN — AMLODIPINE BESYLATE 5 MG: 5 TABLET ORAL at 22:09

## 2025-01-20 RX ADMIN — IPRATROPIUM BROMIDE AND ALBUTEROL SULFATE 3 ML: .5; 3 SOLUTION RESPIRATORY (INHALATION) at 20:42

## 2025-01-20 RX ADMIN — Medication 1 HALF-TAB: at 07:45

## 2025-01-20 RX ADMIN — IPRATROPIUM BROMIDE AND ALBUTEROL SULFATE 3 ML: .5; 3 SOLUTION RESPIRATORY (INHALATION) at 16:07

## 2025-01-20 RX ADMIN — MORPHINE SULFATE 5 MG: 20 SOLUTION ORAL at 07:46

## 2025-01-20 ASSESSMENT — ACTIVITIES OF DAILY LIVING (ADL)
ADLS_ACUITY_SCORE: 45
ADLS_ACUITY_SCORE: 46
ADLS_ACUITY_SCORE: 45
ADLS_ACUITY_SCORE: 46
ADLS_ACUITY_SCORE: 45
ADLS_ACUITY_SCORE: 46
ADLS_ACUITY_SCORE: 45
ADLS_ACUITY_SCORE: 45
ADLS_ACUITY_SCORE: 46
ADLS_ACUITY_SCORE: 45
ADLS_ACUITY_SCORE: 46
ADLS_ACUITY_SCORE: 45
ADLS_ACUITY_SCORE: 46

## 2025-01-20 NOTE — PROGRESS NOTES
"Care Management Follow Up    Length of Stay (days): 16    Expected Discharge Date: 01/23/25     Concerns to be Addressed: discharge planning     Patient plan of care discussed at interdisciplinary rounds: Yes    Anticipated Discharge Disposition: Home, Hospice         Anticipated Discharge Services: Other (see comment) (current on home hospice)  Anticipated Discharge DME: None    Patient/family educated on Medicare website which has current facility and service quality ratings:    Education Provided on the Discharge Plan: Yes  Patient/Family in Agreement with the Plan:      Referrals Placed by CM/SW: Homecare (currently on home hospice)  Private pay costs discussed: Not applicable    Discussed  Partnership in Safe Discharge Planning  document with patient/family: Yes:     Handoff Completed: No, handoff not indicated or clinically appropriate    Additional Information:  JARAD received  update that Pt's previous Enteric isolation precautions were  removed as Pt has tested  negative for C-Diff. JARAD updated [facility] [phone]  Fax: [phone] who requested confirmation of  [id number] be  faxed over. JARAD faxed confirmation and is awaiting update on if pt  is accepted and on possible admission date. SW/RNCC will  continue to follow Pt for safe discharge placement and planning.      Addendum: [facility] still able to review [facility] but is prioritizing other Pt's who are \"more actively dying.\" SW updated Provider on this. JARAD will also continue to monitor for Pt's Medicaid to be active which will allow for referrals to LTC to be sent without out of pocket expenses for Pt and family to accrue which is not financially feasible for them at this time.     Next Steps:   SW/RNCC will continue to follow for safe discharge planning and placement.       Antonio Houston, [facility] HospitalSW  [facility]   Ph: [phone]      "

## 2025-01-20 NOTE — PROGRESS NOTES
CLINICAL NUTRITION SERVICES - REASSESSMENT NOTE     Nutrition Prescription    RECOMMENDATIONS FOR MDs/PROVIDERS TO ORDER:  Encourage intakes, small frequent meals if pt having difficulty breathing with eating.     Malnutrition Status:    Moderate malnutrition in the context of acute illness    Recommendations already ordered by Registered Dietitian (RD):  Encouraged intakes  Continue current supplements    Future/Additional Recommendations:  Monitor labs, intakes, and weight trends.     EVALUATION OF THE PROGRESS TOWARD GOALS   Diet: Regular  Intake/[place]: Poorly documented over last week   Snacks/supplements: Ensure Enlive TID    Pt ordering (on average) 3400 kcal and 60 g protein per day per [employer]. Supplements add 1050 kcal and 60 g protein daily. With  reported intakes, pt is likely meeting minimum energy and protein needs.     NEW FINDINGS/REVIEW OF SYSTEMS    Nutrition/GI: RD met with pt at bedside. Pt reports fair appetite/intakes. Pt states he drinks 1-2 Ensure daily. Encouraged intakes.     Weights:  Pt weight stable with fluctuations over last 2 weeks during admission and over last three months.   01/20/25 73.3 kg (161 lb 9.6 oz)   01/17/25 73.5 kg (162 lb 1.6 oz)   01/16/25 72.8 kg (160 lb 6.4 oz)   01/14/25 72.8 kg (160 lb 9.6 oz)    01/13/25 71.5 kg (157 lb 10.1 oz)    01/12/25 72.7 kg (160 lb 4.8 oz)   01/11/25 73.1 kg (161 lb 1.6 oz)    01/06/25 71.6 kg (157 lb 14.4 oz)    10/24/24 71.5 kg (157 lb 9.6 oz)   10/18/24 70.8 kg (156 lb)   09/23/24 70.9 kg (156 lb 6 oz)   09/20/24 70.8 kg (156 lb)   07/18/24 73 kg (161 lb)   06/19/24 73 kg (161 lb)   05/30/24 70.8 kg (156 lb)   05/14/24 77.1 kg (170 lb)   05/10/24 77.1 kg (170 lb)   05/08/24 77.5 kg (170 lb 12.8 oz)   05/01/24 78.6 kg (173 lb 3.2 oz)   04/29/24 78.7 kg (173 lb 6.4 oz)   04/26/24 75.9 kg (167 lb 6.4 oz)   04/22/24 78.5 kg (173 lb)   04/07/24 78.5 kg (173 lb)   03/19/24 81.2 kg (179 lb)   03/07/24 82.1 kg (181 lb)   01/26/24 80.7 kg  (178 lb)   01/17/24 80.3 kg (177 lb)     Skin: Lee 18, Nutrition 3     Labs reviewed   01/13/25 07:03 01/14/25 07:08 01/15/25 11:01 01/16/25 06:04   Sodium 148 (H) 148 (H) 145 144   Potassium 4.1 3.8 4.1 4.3   Urea Nitrogen 32.7 (H) 37.1 (H) 30.1 (H) 25.4 (H)   Glucose 106 (H) 85 155 (H) 118 (H)      Medications reviewed: Azithromycin, sinemet TID, lasix, levothyroxine, morphine, zosyn, potassium chloride    MALNUTRITION  % Intake: Decreased intake does not meet criteria  % Weight Loss: Weight loss does not meet criteria  Subcutaneous Fat Loss: Upper Arm mild  Muscle Loss: Global mild-moderate  Fluid Accumulation/Edema: None noted  Malnutrition Diagnosis: Moderate malnutrition in the context of acute illness    Previous Goals   Patient to consume % of nutritionally adequate meal trays TID, or the equivalent with supplements/snacks.  Evaluation: Likely met    Previous Nutrition Diagnosis  Predicted inadequate protein-energy intake related to variable appetite as evidenced by pt reliant on PO intakes to meet 100% of nutritional needs with potential for variation  Evaluation: No change    CURRENT NUTRITION DIAGNOSIS  Predicted inadequate protein-energy intake related to variable appetite as evidenced by pt reliant on PO intakes to meet 100% of nutritional needs with potential for variation    INTERVENTIONS  Implementation  Nutrition education for nutrition relationship to health/disease   Medical food supplement therapy     Goals  Patient to consume % of nutritionally adequate meal trays TID, or the equivalent with supplements/snacks.    Monitoring/Evaluation  Progress toward goals will be monitored and evaluated per protocol.    Kristine Zarate MS, RDN, LD  TCU/OB/Ortho Clinical Dietitian  Available via phone and Vocera  Phone: [phone]  Vocera: 5 Ortho Clinical Dietitian   Weekend/Holiday Vocera: Weekend Holiday Clinical Dietitian [Multi Site Groups]

## 2025-01-20 NOTE — PROGRESS NOTES
"[facility]    Medicine Progress Note - Hospitalist Service, GOLD TEAM 17    Date of Admission:  1/4/2025    Assessment & Plan   A: Patient is a 78 y/o man who has a past medical history significant for thyroid cancer with lung metastases, Parkinson's disease, neuropathy, glaucoma, osteoporosis, chronic pain, severe COPD, hyperlipidemia, obstructive sleep apnea and coronary artery disease. Patient presented on 04-Jan-2025 with a 7 day history of productive cough and a 1 day history of weakness. CT pulmonary angiogram was negative for pulmonary embolism. Patient appears to have COPD exacerbation but there is concern for community-acquired pneumonia as well. There is also concern that patient was in early sepsis on presentation.      Patient reportedly saw Pulmonology in Oct-2024 and COPD was moderately well-controlled at that time. Activity was apparently limited much more by his balance and parkinsonism than by respiratory status. Patient has antibiotics and prednisone available for outpatient COPD flares but apparently has not recently used this.      Patient apparently just entered hospice in the past week prior to hospital admission. Patient stated on admission that he was too weak and he and his daughter called their hospice agency and 911 was called and he was brought into the hospital. When provider asked on day of presentation whether patient wished for hospitalization and treatment of his potential infection, patient stated that he was seeking treatment and reported that his goal was to \"be able to do things for myself\". It was discussed that hospice typically focused on comfort rather than diagnostic testing and aggressive medical treatment. Patient acknowledged this but had seemed unsure of what his true desires for his care are moving forward.      Patient was on ceftriaxone from 04-Jan-2025 to 08-Jan-2025. Patient was azithromycin 500 mg from 05-Jan-2025 to " 07-Jan-2025. Patient was on zosyn from 09-Jan-2025 to 14-Jan-2025.      Patient's overall prognosis is poor. Patient has been seen by palliative care and plan is now for life-prolonging with limits (DNR/DNI) with plan to discharge to hospice at facility.    P:  1.) Pneumonia:  - Patient initially was treated for a possible community-acquired pneumonia on admission with ceftriaxone and azithromycin.  - Patient subsequently was treated for possible hospital-acquired pneumonia with zosyn.  - Monitoring for recurrent infection.     2.) COPD exacerbation:  - Patient was on prednisone 40 mg daily from 04-Jan-2025 to 08-Jan-2025. Patient received prednisone 20 mg once on 09-Jan-2025 but was then on methylprednisolone 40 mg IV every 8 hours from 09-Jan-2025 yo 13-Jan-2025.   - Patient now on prednisone 20 mg twice daily. Anticipate eventual steroid taper.   - Patient on azithromycin 500 mg every other day for now.     3.) Chronic hypoxemic respiratory failure:  - Patient usually on 1-2 litres per minute of supplemental oxygen at baseline.  - Patient patient's daughter, patient may be placed on BiPAP if needed.     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  - Further surveillance as outpatient.     5.) Right upper lobe lung mass s/p radiation therapy with possible radiation pneumonitis:  - Patient had previous VATS biopsy in Feb-2022 that was non-diagnostic.     6.) Post-surgical hypothyroidism:  - Patient on levothyroxine 112 mcg daily.     7.) Rheumatoid arthritis with positive rheumatoid factor:  - Per chart review, patient had once been on methotrexate and prednisone but had stopped these medications a few years ago.  - Patient to f/u as outpatient if desired.     8.) Coronary artery disease with past MI s/p stent placement:  - Patient usually on aspirin and crestor but these are currently on hold.  - Patient continuing metoprolol succinate 50 mg  daily.      9.) Hypertension:  - Monitoring on metoprolol succinate.  - Patient had been on lasix but this is on hold in light of hypernatremia.     10.) Hypernatremia, improved:  - Monitoring for symptoms.     11.) Hyperlipidemia:  - Patient usually on crestor but this is on hold.     12.) Parkinson's disease:  - Patient on sinemet three times a day.     13.) Neuropathy, chronic pain syndrome:  - Patient currently on gabapentin 400 mg three times a day.  - Patient usually on methocarbamol but this is currently on hold.     14.) Glaucoma:  - Patient not currently on medication for this.  - Patient to f/u with Ophthalmology as outpatient if further treatment is desired.     15.) Osteoporosis:  - Patient on prolia as outpatient.     16.) Acute thrombocytopenia:  - Monitoring for bleeding.     17.) History of subarachnoid hemorrhage in 2023:  - No active intervention indicated.     18.) Moderate malnutrition in the context of acute vs. chronic illness:  - Supplementing as able.      19.) Discharge planning for hospice facility.          Diet: Snacks/Supplements Adult: Ensure Enlive; With Meals  Regular Diet Adult Thin Liquids (level 0)    Mir Catheter: Not present  Lines: None     Cardiac Monitoring: None  Code Status: No CPR- Do NOT Intubate      Clinically Significant Risk Factors                   # Hypertension: Noted on problem list     # Acute Hypoxic Respiratory Failure: Documented O2 saturation < 90%. Continue supplemental oxygen as needed          # Moderate Malnutrition: based on nutrition assessment    # Financial/Environmental Concerns:           Social Drivers of Health    Tobacco Use: Medium Risk (10/18/2024)    Patient History     Smoking Tobacco Use: Former     Smokeless Tobacco Use: Never   Physical Activity: Inactive (10/14/2024)    Exercise Vital Sign     Days of Exercise per Week: 0 days     Minutes of Exercise per Session: 0 min   Stress: Stress Concern Present (10/14/2024)    [employer] - Occupational Stress Questionnaire     Feeling of Stress : Very much   Social Connections: Unknown (10/14/2024)    Social Connection and Isolation Panel [NHANES]     Frequency of Social Gatherings with Friends and Family: More than three times a week          Disposition Plan     Medically Ready for Discharge: Anticipated in 2-4 Days             Gonzalez Watt MD  Hospitalist Service, GOLD TEAM 17  [facility]  Securely message with [employer] (more info)  Text page via [employer] Paging/Directory   See signed in provider for up to date coverage information  ______________________________________________________________________    Interval History   Patient noted dyspnea unchanged. Patient noted no new problems.    Physical Exam   Vital Signs: Temp: 98.4  F (36.9  C) Temp src: Oral BP: (!) 140/76 Pulse: 73   Resp: 18 SpO2: 95 % O2 Device: Nasal cannula Oxygen Delivery: 3 LPM  Weight: 162 lbs 1.6 oz    General: Patient comfortable, NAD.

## 2025-01-20 NOTE — PLAN OF CARE
Goal Outcome Evaluation:    BP (!) 140/62 (BP Location: Left arm)   Pulse 80   Temp 97.3  F (36.3  C) (Oral)   Resp 16   Wt (P) 73.3 kg (161 lb 9.6 oz)   SpO2 100%   BMI (P) 24.21 kg/m          [id number].  Patient alert and oriented x4. Able to make needs known, call light within reach. Patient has dyspnea on exertion. Patient is on 3L of oxygen via NC, sats >90%. Rating back 6/10, patient declined tylenol, and robaxin, patient got prn morphine, and it's ok to give by pharmacy. Ax1, with walker. Regular diet, meds whole, thin liquid. Thin delicate skin, and has scattered bruising on BUE. Voids spont without difficulty. LBM 1/20, passing flatus. Patient resting comfortably in bed.     Continue with plan of care

## 2025-01-20 NOTE — PLAN OF CARE
VS: BP (!) 140/76   Pulse 73   Temp 98.4  F (36.9  C)   Resp 18   Wt (P) 73.3 kg (161 lb 9.6 oz)   SpO2 97%   BMI (P) 24.21 kg/m       O2: 3L NC   Output: Voids spont without difficulty   Last BM: 1/20/25    Activity: Ax1 with walker and gb   Skin: Scattered bruising   Pain: Managed with scheduled morphine   CMS: A&Ox4   Dressing: N/A   Diet: Reg   LDA: R PIV SL   Equipment: IV pole, call light, walker, gb, personal belongings   Plan: Continue to monitor pt's sats, administer pain meds and abx

## 2025-01-20 NOTE — PROGRESS NOTES
SHIFT [id number]    Pt A/O x4.SBA w/walker. R PIV SL. LBM- 1/18 continent, barrier cream to retcal area and between butt cheeks. Diet reg. On hospice care, DNI no CPR. Call light within reach, makes needs known, no concerns. Will continue POC.

## 2025-01-20 NOTE — PLAN OF CARE
Pt remains A/O x4. Pt continues to have elevated BPs at baseline, otherwise vitally stable. Pt continues to deny chest pain & N/V. Pt continues to be SOB with activity. Pt continues to be assist x1 with walker and refuses usage of a gait belt. Pt continues to be on O2 at 2 LPM via NC. Pt continues to have infrequent productive cough. Pt continues to have chronic back pain managed per MAR. Pt uses call light appropriately and able to make needs known. No acute changes overnight.     Goal Outcome Evaluation:    Plan of Care Reviewed With: patient    Overall Patient Progress: improving

## 2025-01-21 PROCEDURE — 94640 AIRWAY INHALATION TREATMENT: CPT | Mod: 76

## 2025-01-21 PROCEDURE — 250N000013 HC RX MED GY IP 250 OP 250 PS 637: Performed by: PHYSICIAN ASSISTANT

## 2025-01-21 PROCEDURE — 120N000002 HC R&B MED SURG/OB UMMC

## 2025-01-21 PROCEDURE — 94640 AIRWAY INHALATION TREATMENT: CPT

## 2025-01-21 PROCEDURE — 250N000013 HC RX MED GY IP 250 OP 250 PS 637: Performed by: STUDENT IN AN ORGANIZED HEALTH CARE EDUCATION/TRAINING PROGRAM

## 2025-01-21 PROCEDURE — 99232 SBSQ HOSP IP/OBS MODERATE 35: CPT | Performed by: INTERNAL MEDICINE

## 2025-01-21 PROCEDURE — 250N000013 HC RX MED GY IP 250 OP 250 PS 637

## 2025-01-21 PROCEDURE — 250N000009 HC RX 250

## 2025-01-21 PROCEDURE — 999N000157 HC STATISTIC RCP TIME EA 10 MIN

## 2025-01-21 PROCEDURE — 250N000013 HC RX MED GY IP 250 OP 250 PS 637: Performed by: INTERNAL MEDICINE

## 2025-01-21 RX ORDER — FUROSEMIDE 20 MG/1
20 TABLET ORAL DAILY
Status: DISCONTINUED | OUTPATIENT
Start: 2025-01-22 | End: 2025-01-30

## 2025-01-21 RX ADMIN — MORPHINE SULFATE 5 MG: 20 SOLUTION ORAL at 08:39

## 2025-01-21 RX ADMIN — IPRATROPIUM BROMIDE AND ALBUTEROL SULFATE 3 ML: .5; 3 SOLUTION RESPIRATORY (INHALATION) at 08:00

## 2025-01-21 RX ADMIN — IPRATROPIUM BROMIDE AND ALBUTEROL SULFATE 3 ML: .5; 3 SOLUTION RESPIRATORY (INHALATION) at 15:48

## 2025-01-21 RX ADMIN — Medication 1 HALF-TAB: at 13:31

## 2025-01-21 RX ADMIN — Medication 1 HALF-TAB: at 08:30

## 2025-01-21 RX ADMIN — ROSUVASTATIN 10 MG: 10 TABLET, FILM COATED ORAL at 08:31

## 2025-01-21 RX ADMIN — CARBIDOPA AND LEVODOPA 1 TABLET: 25; 100 TABLET ORAL at 21:34

## 2025-01-21 RX ADMIN — METOPROLOL SUCCINATE 50 MG: 50 TABLET, EXTENDED RELEASE ORAL at 08:31

## 2025-01-21 RX ADMIN — GABAPENTIN 400 MG: 300 CAPSULE ORAL at 08:31

## 2025-01-21 RX ADMIN — MORPHINE SULFATE 5 MG: 20 SOLUTION ORAL at 21:34

## 2025-01-21 RX ADMIN — MORPHINE SULFATE 5 MG: 20 SOLUTION ORAL at 13:31

## 2025-01-21 RX ADMIN — IPRATROPIUM BROMIDE AND ALBUTEROL SULFATE 3 ML: .5; 3 SOLUTION RESPIRATORY (INHALATION) at 19:51

## 2025-01-21 RX ADMIN — IPRATROPIUM BROMIDE AND ALBUTEROL SULFATE 3 ML: .5; 3 SOLUTION RESPIRATORY (INHALATION) at 12:24

## 2025-01-21 RX ADMIN — CARBIDOPA AND LEVODOPA 1 TABLET: 25; 100 TABLET ORAL at 13:31

## 2025-01-21 RX ADMIN — LEVOTHYROXINE SODIUM 112 MCG: 112 TABLET ORAL at 08:31

## 2025-01-21 RX ADMIN — Medication 1 HALF-TAB: at 21:36

## 2025-01-21 RX ADMIN — CARBIDOPA AND LEVODOPA 1 TABLET: 25; 100 TABLET ORAL at 08:31

## 2025-01-21 RX ADMIN — AMLODIPINE BESYLATE 5 MG: 5 TABLET ORAL at 21:32

## 2025-01-21 RX ADMIN — GABAPENTIN 400 MG: 300 CAPSULE ORAL at 13:31

## 2025-01-21 RX ADMIN — GABAPENTIN 400 MG: 300 CAPSULE ORAL at 21:31

## 2025-01-21 ASSESSMENT — ACTIVITIES OF DAILY LIVING (ADL)
ADLS_ACUITY_SCORE: 42
ADLS_ACUITY_SCORE: 45
ADLS_ACUITY_SCORE: 45
ADLS_ACUITY_SCORE: 42
ADLS_ACUITY_SCORE: 45
ADLS_ACUITY_SCORE: 42
ADLS_ACUITY_SCORE: 45
ADLS_ACUITY_SCORE: 42
ADLS_ACUITY_SCORE: 45
ADLS_ACUITY_SCORE: 45
ADLS_ACUITY_SCORE: 42
ADLS_ACUITY_SCORE: 45
ADLS_ACUITY_SCORE: 42
ADLS_ACUITY_SCORE: 45
ADLS_ACUITY_SCORE: 45
ADLS_ACUITY_SCORE: 42
ADLS_ACUITY_SCORE: 45

## 2025-01-21 NOTE — PROGRESS NOTES
"[facility]    Medicine Progress Note - Hospitalist Service, GOLD TEAM 17    Date of Admission:  1/4/2025    Assessment & Plan      jeannie is a 78 y/o man who has a past medical history significant for thyroid cancer with lung metastases, Parkinson's disease, neuropathy, glaucoma, osteoporosis, chronic pain, severe COPD, hyperlipidemia, obstructive sleep apnea and coronary artery disease. Patient presented on 04-Jan-2025 with a 7 day history of productive cough and a 1 day history of weakness. CT pulmonary angiogram was negative for pulmonary embolism. Patient appears to have COPD exacerbation but there is concern for community-acquired pneumonia as well. There is also concern that patient was in early sepsis on presentation.      Patient reportedly saw Pulmonology in Oct-2024 and COPD was moderately well-controlled at that time. Activity was apparently limited much more by his balance and parkinsonism than by respiratory status. Patient has antibiotics and prednisone available for outpatient COPD flares but apparently has not recently used this.      Patient apparently just entered hospice in the past week prior to hospital admission. Patient stated on admission that he was too weak and he and his daughter called their hospice agency and 911 was called and he was brought into the hospital. When provider asked on day of presentation whether patient wished for hospitalization and treatment of his potential infection, patient stated that he was seeking treatment and reported that his goal was to \"be able to do things for myself\". It was discussed that hospice typically focused on comfort rather than diagnostic testing and aggressive medical treatment. Patient acknowledged this but had seemed unsure of what his true desires for his care are moving forward.      Patient was on ceftriaxone from 04-Jan-2025 to 08-Jan-2025. Patient was azithromycin 500 mg from 05-Jan-2025 to " 07-Jan-2025. Patient was on zosyn from 09-Jan-2025 to 14-Jan-2025.      Patient's overall prognosis is poor. Patient has been seen by palliative care and plan is now for life-prolonging with limits (DNR/DNI) with plan to discharge to hospice at facility.     P:  1.) Pneumonia: completed abx  - Patient initially was treated for a possible community-acquired pneumonia on admission with ceftriaxone and azithromycin.  - Patient subsequently was treated for possible hospital-acquired pneumonia with zosyn.       2.) COPD exacerbation:  - Patient was on prednisone 40 mg daily from 04-Jan-2025 to 08-Jan-2025. Patient received prednisone 20 mg once on 09-Jan-2025 but was then on methylprednisolone 40 mg IV every 8 hours from 09-Jan-2025 yo 13-Jan-2025.   Patient is not on steroids any more  - Patient on azithromycin 500 mg every other day for now.     3.) Chronic hypoxemic respiratory failure:  - Patient usually on 1-2 litres per minute of supplemental oxygen at baseline.  - Patient patient's daughter, patient may be placed on BiPAP if needed.     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  - Further surveillance as outpatient.     5.) Right upper lobe lung mass s/p radiation therapy with possible radiation pneumonitis:  - Patient had previous VATS biopsy in Feb-2022 that was non-diagnostic.     6.) Post-surgical hypothyroidism:  - Patient on levothyroxine 112 mcg daily.     7.) Rheumatoid arthritis with positive rheumatoid factor:  - Per chart review, patient had once been on methotrexate and prednisone but had stopped these medications a few years ago.  - Patient to f/u as outpatient if desired.     8.) Coronary artery disease with past MI s/p stent placement:  - Patient usually on aspirin and crestor but these are currently on hold.  - Patient continuing metoprolol succinate 50 mg daily.      9.) Hypertension:  - Monitoring on metoprolol succinate.  -  Patient had been on lasix but this is on hold in light of hypernatremia.     10.) Hypernatremia, improved:  - Monitoring for symptoms.     11.) Hyperlipidemia:  - Patient usually on crestor but this is on hold.     12.) Parkinson's disease:  - Patient on sinemet three times a day.     13.) Neuropathy, chronic pain syndrome:  - Patient currently on gabapentin 400 mg three times a day.  - Patient usually on methocarbamol but this is currently on hold.     14.) Glaucoma:  - Patient not currently on medication for this.  - Patient to f/u with Ophthalmology as outpatient if further treatment is desired.     15.) Osteoporosis:  - Patient on prolia as outpatient.     16.) Acute thrombocytopenia:  - Monitoring for bleeding.     17.) History of subarachnoid hemorrhage in 2023:  - No active intervention indicated.     18.) Moderate malnutrition in the context of acute vs. chronic illness:  - Supplementing as able.            Diet: Snacks/Supplements Adult: Ensure Enlive; With Meals  Regular Diet Adult Thin Liquids (level 0)    DVT Prophylaxis: Pneumatic Compression Devices  Mir Catheter: Not present  Lines: None     Cardiac Monitoring: None  Code Status: No CPR- Do NOT Intubate      Clinically Significant Risk Factors                   # Hypertension: Noted on problem list     # Acute Hypoxic Respiratory Failure: Documented O2 saturation < 90%. Continue supplemental oxygen as needed          # Moderate Malnutrition: based on nutrition assessment    # Financial/Environmental Concerns:           Social Drivers of Health    Tobacco Use: Medium Risk (10/18/2024)    Patient History     Smoking Tobacco Use: Former     Smokeless Tobacco Use: Never   Physical Activity: Inactive (10/14/2024)    Exercise Vital Sign     Days of Exercise per Week: 0 days     Minutes of Exercise per Session: 0 min   Stress: Stress Concern Present (10/14/2024)    [employer] - Occupational Stress Questionnaire     Feeling of  Stress : Very much   Social Connections: Unknown (10/14/2024)    Social Connection and Isolation Panel [NHANES]     Frequency of Social Gatherings with Friends and Family: More than three times a week          Disposition Plan     Medically Ready for Discharge: Anticipated in 5+ Days             Samantha Niño MD  Hospitalist Service, GOLD TEAM 17  [facility]  Securely message with [employer] (more info)  Text page via [facility] Paging/Directory   See signed in provider for up to date coverage information  ______________________________________________________________________    Interval History   No new issues  [facility] palliative and SW following patient   Gets out of breath with minimal exertion   Has no new concerns'  Nursing in room     Physical Exam   Vital Signs: Temp: 98.6  F (37  C) Temp src: Oral BP: 122/63 Pulse: 74   Resp: 18 SpO2: 97 % O2 Device: Nasal cannula Oxygen Delivery: 2 LPM  Weight: 162 lbs 1.6 oz         Alert and oriented . mild distress  Chest ; Breath sounds are reduced BL . noted .  Neuro ; movinga ll 4 ext . Went to bathroom earlier       Medical Decision Making       45 MINUTES SPENT BY ME on the date of service doing chart review, history, exam, documentation & further activities per the note.      Data

## 2025-01-21 NOTE — PLAN OF CARE
Goal Outcome Evaluation:      Plan of Care Reviewed With: patient    Overall Patient Progress: no changeOverall Patient Progress: no change         VS: /63 (BP Location: Left arm)   Pulse 74   Temp 98.6  F (37  C) (Oral)   Resp 18   Wt (P) 73.3 kg (161 lb 9.6 oz)   SpO2 97%   BMI (P) 24.21 kg/m     O2: SpO2> 90% on 2L via NC. ACEVES. Had frequent productive cough.     Output: Voiding spontaneously without difficulty   Last BM: 01/20/25   Activity: A1 with walker, refuses GB   Skin: Pt c/o skin breakdown in upper thigh/janes area. Mepilex applied.  Scattered bruising.  Mepilex on L arm changed   Pain: Pain managed with scheduled morphine this shift   CMS: A&Ox4.   Dressing: CDI   Diet: Regular diet.    LDA: R PIV SL   Equipment: IV pole, personal belongings.   Plan: TBD   Additional Info:

## 2025-01-21 NOTE — PLAN OF CARE
Goal Outcome Evaluation:      Plan of Care Reviewed With: patient    Overall Patient Progress: no changeOverall Patient Progress: no change           VS: Temp: 98.6  F (37  C) Temp src: Oral BP: 121/55 Pulse: 74   Resp: 18 SpO2: 94 % O2 Device: Nasal cannula Oxygen Delivery: 2 LPM     O2: 2L NC. PT experieces SOB with movement. Has frequent productive cough.   Output: Voiding spontaneously without difficulty   Last BM: 1/20   Activity: Asst x1 with walker. PT refuses gait belt.    Skin: Pt c/o skin breakdown in upper thigh/janes area. Mepelex applied.  Scattered bruising.  Mepelex o L arm changed   Pain: Pain managed with scheduled morphine this shift   CMS: A&Ox4.   Dressing: N/A   Diet: Reg diet. Meds whole.   LDA: R PIV SL   Equipment: IV pole, personal belongings.   Plan: Pain management   Additional Info:

## 2025-01-22 LAB
ANION GAP SERPL CALCULATED.3IONS-SCNC: 8 MMOL/L (ref 7–15)
BUN SERPL-MCNC: 12.9 MG/DL (ref 8–23)
CALCIUM SERPL-MCNC: 8.6 MG/DL (ref 8.8–10.4)
CHLORIDE SERPL-SCNC: 106 MMOL/L (ref 98–107)
CREAT SERPL-MCNC: 0.76 MG/DL (ref 0.67–1.17)
EGFRCR SERPLBLD CKD-EPI 2021: >90 ML/MIN/1.73M2
GLUCOSE SERPL-MCNC: 102 MG/DL (ref 70–99)
HCO3 SERPL-SCNC: 29 MMOL/L (ref 22–29)
HOLD SPECIMEN: NORMAL
POTASSIUM SERPL-SCNC: 3.9 MMOL/L (ref 3.4–5.3)
SODIUM SERPL-SCNC: 143 MMOL/L (ref 135–145)

## 2025-01-22 PROCEDURE — 36415 COLL VENOUS BLD VENIPUNCTURE: CPT | Performed by: INTERNAL MEDICINE

## 2025-01-22 PROCEDURE — 80048 BASIC METABOLIC PNL TOTAL CA: CPT | Performed by: INTERNAL MEDICINE

## 2025-01-22 PROCEDURE — 250N000013 HC RX MED GY IP 250 OP 250 PS 637: Performed by: INTERNAL MEDICINE

## 2025-01-22 PROCEDURE — 120N000002 HC R&B MED SURG/OB UMMC

## 2025-01-22 PROCEDURE — 250N000013 HC RX MED GY IP 250 OP 250 PS 637: Performed by: STUDENT IN AN ORGANIZED HEALTH CARE EDUCATION/TRAINING PROGRAM

## 2025-01-22 PROCEDURE — 250N000013 HC RX MED GY IP 250 OP 250 PS 637: Performed by: PHYSICIAN ASSISTANT

## 2025-01-22 PROCEDURE — 999N000157 HC STATISTIC RCP TIME EA 10 MIN

## 2025-01-22 PROCEDURE — 250N000009 HC RX 250

## 2025-01-22 PROCEDURE — 94640 AIRWAY INHALATION TREATMENT: CPT | Mod: 76

## 2025-01-22 PROCEDURE — 99232 SBSQ HOSP IP/OBS MODERATE 35: CPT | Performed by: INTERNAL MEDICINE

## 2025-01-22 PROCEDURE — 84132 ASSAY OF SERUM POTASSIUM: CPT | Performed by: INTERNAL MEDICINE

## 2025-01-22 PROCEDURE — 250N000013 HC RX MED GY IP 250 OP 250 PS 637

## 2025-01-22 PROCEDURE — 82565 ASSAY OF CREATININE: CPT | Performed by: INTERNAL MEDICINE

## 2025-01-22 RX ADMIN — CARBIDOPA AND LEVODOPA 1 TABLET: 25; 100 TABLET ORAL at 21:41

## 2025-01-22 RX ADMIN — Medication 1 HALF-TAB: at 21:41

## 2025-01-22 RX ADMIN — Medication 1 HALF-TAB: at 16:12

## 2025-01-22 RX ADMIN — MORPHINE SULFATE 5 MG: 20 SOLUTION ORAL at 09:00

## 2025-01-22 RX ADMIN — GABAPENTIN 400 MG: 300 CAPSULE ORAL at 21:01

## 2025-01-22 RX ADMIN — IPRATROPIUM BROMIDE AND ALBUTEROL SULFATE 3 ML: .5; 3 SOLUTION RESPIRATORY (INHALATION) at 17:40

## 2025-01-22 RX ADMIN — GABAPENTIN 400 MG: 300 CAPSULE ORAL at 08:59

## 2025-01-22 RX ADMIN — METOPROLOL SUCCINATE 50 MG: 50 TABLET, EXTENDED RELEASE ORAL at 08:59

## 2025-01-22 RX ADMIN — AZITHROMYCIN DIHYDRATE 500 MG: 250 TABLET ORAL at 08:58

## 2025-01-22 RX ADMIN — IPRATROPIUM BROMIDE AND ALBUTEROL SULFATE 3 ML: .5; 3 SOLUTION RESPIRATORY (INHALATION) at 12:18

## 2025-01-22 RX ADMIN — MORPHINE SULFATE 5 MG: 20 SOLUTION ORAL at 15:19

## 2025-01-22 RX ADMIN — CARBIDOPA AND LEVODOPA 1 TABLET: 25; 100 TABLET ORAL at 15:21

## 2025-01-22 RX ADMIN — ROSUVASTATIN 10 MG: 10 TABLET, FILM COATED ORAL at 08:59

## 2025-01-22 RX ADMIN — Medication 1 HALF-TAB: at 10:21

## 2025-01-22 RX ADMIN — CARBIDOPA AND LEVODOPA 1 TABLET: 25; 100 TABLET ORAL at 08:59

## 2025-01-22 RX ADMIN — LEVOTHYROXINE SODIUM 112 MCG: 112 TABLET ORAL at 08:59

## 2025-01-22 RX ADMIN — AMLODIPINE BESYLATE 5 MG: 5 TABLET ORAL at 21:41

## 2025-01-22 RX ADMIN — GABAPENTIN 400 MG: 300 CAPSULE ORAL at 15:20

## 2025-01-22 RX ADMIN — IPRATROPIUM BROMIDE AND ALBUTEROL SULFATE 3 ML: .5; 3 SOLUTION RESPIRATORY (INHALATION) at 09:30

## 2025-01-22 RX ADMIN — IPRATROPIUM BROMIDE AND ALBUTEROL SULFATE 3 ML: .5; 3 SOLUTION RESPIRATORY (INHALATION) at 21:15

## 2025-01-22 RX ADMIN — MORPHINE SULFATE 5 MG: 20 SOLUTION ORAL at 21:00

## 2025-01-22 ASSESSMENT — ACTIVITIES OF DAILY LIVING (ADL)
ADLS_ACUITY_SCORE: 45

## 2025-01-22 NOTE — PROGRESS NOTES
" Care Management Follow Up    Length of Stay (days): 18    Expected Discharge Date: 01/25/25     Concerns to be Addressed: discharge planning     Patient plan of care discussed at interdisciplinary rounds: Yes    Anticipated Discharge Disposition: Home, Hospice      Anticipated Discharge Services: Other (see comment) (current on home hospice)  Anticipated Discharge DME: None    Patient/family educated on Medicare website which has current facility and service quality ratings:    Education Provided on the Discharge Plan: Yes  Patient/Family in Agreement with the Plan:      Referrals Placed by CM/SW: Homecare (currently on home hospice)  Private pay costs discussed: transportation costs    Discussed  Partnership in Safe Discharge Planning  document with patient/family: Yes:     Handoff Completed: No, handoff not indicated or clinically appropriate    Additional Information:  Pt reviewed at rounds. Pt continues to wait for Medical assistance to be active to aid with funding for LTC discharge with hospice. Pt continues to maintain current baseline and is \"neither doing better or worse.\" This appears to be a barrier to [facility] accepting Pt. [facility] admissions [facility] [phone]  Fax: [phone].     Next Steps:   SW/RNCC continue to follow for safe discharge placement and planning.     Antonio Houston, [facility] CareSW  [facility] ICU & [place] [facility]   Ph: [phone]      "

## 2025-01-22 NOTE — PLAN OF CARE
Pt remains A/O x4. Pt continues to have elevated BPs at baseline, otherwise vitally stable. Pt continues to deny chest pain & N/V. Pt continues to be SOB with activity. Pt continues to be assist x1 with walker and refuses usage of a gait belt. Pt continues to be on O2 at 2 LPM via NC. Pt continues to have chronic back pain managed per MAR. Sacral mepilex applied r/t R buttock abrasion. Pt uses call light appropriately and able to make needs known. No acute changes this shift.     Goal Outcome Evaluation:    Plan of Care Reviewed With: patient    Overall Patient Progress: no change

## 2025-01-22 NOTE — PLAN OF CARE
Goal Outcome Evaluation:      Plan of Care Reviewed With: patient     Pt is A&Ox4. No respiratory distress at this shift and denies chest pain or SOB. Wheezing noted on expiratory. Pt complain of tail bone feeling sore. Meplex placed for prevention and educated Pt on repositioning. Scattered bruising noted. Urinating with no issue. Call light is within reach and Pt is able to make needs known.

## 2025-01-22 NOTE — PLAN OF CARE
Goal Outcome Evaluation:      Plan of Care Reviewed With: patient    Overall Patient Progress: improvingOverall Patient Progress: improving    VS: VSS   O2: SpO2> 90% on 2L via NC. ACEVES. Had frequent productive cough.     Output: Voiding spontaneously without difficulty   Last BM: 01/20/25   Activity: A1 with walker, refuses GB   Skin: skin breakdown in upper thigh/janes area. Mepilex in place.  Scattered bruising.  Mepilex on L arm    Pain: Pain managed with pi;;ow support, declined pain medication    CMS: A&Ox4.   Dressing: CDI   Diet: Regular diet.    LDA: R PIV SL   Equipment: IV pole, personal belongings.   Plan: TBD   Additional Info:

## 2025-01-22 NOTE — PROGRESS NOTES
"[facility]    Medicine Progress Note - Hospitalist Service, GOLD TEAM 17    Date of Admission:  1/4/2025    Assessment & Plan   jeannie is a 80 y/o man who has a past medical history significant for thyroid cancer with lung metastases, Parkinson's disease, neuropathy, glaucoma, osteoporosis, chronic pain, severe COPD, hyperlipidemia, obstructive sleep apnea and coronary artery disease. Patient presented on 04-Jan-2025 with a 7 day history of productive cough and a 1 day history of weakness. CT pulmonary angiogram was negative for pulmonary embolism. Patient appears to have COPD exacerbation but there is concern for community-acquired pneumonia as well. There is also concern that patient was in early sepsis on presentation.      Patient reportedly saw Pulmonology in Oct-2024 and COPD was moderately well-controlled at that time. Activity was apparently limited much more by his balance and parkinsonism than by respiratory status. Patient has antibiotics and prednisone available for outpatient COPD flares but apparently has not recently used this.      Patient apparently just entered hospice in the past week prior to hospital admission. Patient stated on admission that he was too weak and he and his daughter called their hospice agency and 911 was called and he was brought into the hospital. When provider asked on day of presentation whether patient wished for hospitalization and treatment of his potential infection, patient stated that he was seeking treatment and reported that his goal was to \"be able to do things for myself\". It was discussed that hospice typically focused on comfort rather than diagnostic testing and aggressive medical treatment. Patient acknowledged this but had seemed unsure of what his true desires for his care are moving forward.      Patient was on ceftriaxone from 04-Jan-2025 to 08-Jan-2025. Patient was azithromycin 500 mg from 05-Jan-2025 to " 07-Jan-2025. Patient was on zosyn from 09-Jan-2025 to 14-Jan-2025.      Patient's overall prognosis is poor. Patient has been seen by palliative care     1/22  Awaits placement in LTC with hospice   [facility] palliative input and  help   BP reviewed     P:  1.) Pneumonia: completed abx  - Patient initially was treated for a possible community-acquired pneumonia on admission with ceftriaxone and azithromycin.  - Patient subsequently was treated for possible hospital-acquired pneumonia with zosyn.       2.) COPD exacerbation:  - Patient was on prednisone 40 mg daily from 04-Jan-2025 to 08-Jan-2025. Patient received prednisone 20 mg once on 09-Jan-2025 but was then on methylprednisolone 40 mg IV every 8 hours from 09-Jan-2025 yo 13-Jan-2025.   Patient is not on steroids any more  - Patient on azithromycin 500 mg every other day for now.     3.) Chronic hypoxemic respiratory failure:  - Patient usually on 1-2 litres per minute of supplemental oxygen at baseline.  - Patient patient's daughter, patient may be placed on BiPAP if needed.     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  - Further surveillance as outpatient.     5.) Right upper lobe lung mass s/p radiation therapy with possible radiation pneumonitis:  - Patient had previous VATS biopsy in Feb-2022 that was non-diagnostic.     6.) Post-surgical hypothyroidism:  - Patient on levothyroxine 112 mcg daily.     7.) Rheumatoid arthritis with positive rheumatoid factor:  - Per chart review, patient had once been on methotrexate and prednisone but had stopped these medications a few years ago.  - Patient to f/u as outpatient if desired.     8.) Coronary artery disease with past MI s/p stent placement:  - Patient usually on aspirin and crestor but these are currently on hold.  - Patient continuing metoprolol succinate 50 mg daily.      9.) Hypertension:  - Monitoring on metoprolol succinate.  - Patient  had been on lasix but this is on hold in light of hypernatremia.     10.) Hypernatremia, improved:  - Monitoring for symptoms.     11.) Hyperlipidemia:  - Patient usually on crestor but this is on hold.     12.) Parkinson's disease:  - Patient on sinemet three times a day.     13.) Neuropathy, chronic pain syndrome:  - Patient currently on gabapentin 400 mg three times a day.  - Patient usually on methocarbamol but this is currently on hold.     14.) Glaucoma:  - Patient not currently on medication for this.  - Patient to f/u with Ophthalmology as outpatient if further treatment is desired.     15.) Osteoporosis:  - Patient on prolia as outpatient.     16.) Acute thrombocytopenia:  - Monitoring for bleeding.     17.) History of subarachnoid hemorrhage in 2023:  - No active intervention indicated.     18.) Moderate malnutrition in the context of acute vs. chronic illness:  - Supplementing as able.              Diet: Snacks/Supplements Adult: Ensure Enlive; With Meals  Regular Diet Adult Thin Liquids (level 0)    DVT Prophylaxis: Pneumatic Compression Devices  Mir Catheter: Not present  Lines: None     Cardiac Monitoring: None  Code Status: No CPR- Do NOT Intubate      Clinically Significant Risk Factors                   # Hypertension: Noted on problem list     # Acute Hypoxic Respiratory Failure: Documented O2 saturation < 90%. Continue supplemental oxygen as needed          # Moderate Malnutrition: based on nutrition assessment    # Financial/Environmental Concerns:           Social Drivers of Health    Tobacco Use: Medium Risk (10/18/2024)    Patient History     Smoking Tobacco Use: Former     Smokeless Tobacco Use: Never   Physical Activity: Inactive (10/14/2024)    Exercise Vital Sign     Days of Exercise per Week: 0 days     Minutes of Exercise per Session: 0 min   Stress: Stress Concern Present (10/14/2024)    [employer] - Occupational Stress Questionnaire     Feeling of Stress  : Very much   Social Connections: Unknown (10/14/2024)    Social Connection and Isolation Panel [NHANES]     Frequency of Social Gatherings with Friends and Family: More than three times a week          Disposition Plan     Medically Ready for Discharge: Anticipated in 5+ Days             Samantha Niño MD  Hospitalist Service, GOLD TEAM 17  [facility]  Securely message with [facility] (more info)  Text page via [employer] Paging/Directory   See signed in provider for up to date coverage information  ______________________________________________________________________    Interval History   Complains of sore bottom when he sits on it for long time   Nursing reports no new concern  Very short of breath on minimal exertion     Physical Exam   Vital Signs: Temp: 98.3  F (36.8  C) Temp src: Oral BP: 128/64 Pulse: 83   Resp: 18 SpO2: 94 % O2 Device: Nasal cannula Oxygen Delivery: 2 LPM  Weight: 161 lbs 9.55 oz    Sitting up in bed   Talks in brief sentences and then get out of breath   Chest ; reduced BS BL   CVS ; RRR  Skin dry   Alert and oriented     Medical Decision Making       45 MINUTES SPENT BY ME on the date of service doing chart review, history, exam, documentation & further activities per the note.      Data     I have personally reviewed the following data over the past 24 hrs:    N/A  \   N/A   / N/A     143 106 12.9 /  102 (H)   3.9 29 0.76 \

## 2025-01-22 NOTE — PROGRESS NOTES
Pt is A/O x4. Pt Bps slightly elevated at baseline, otherwise VSS. Pt has SOB upon exertion. Pt is Ax1 with walker and refuses gait belt. Pt is on continuous O2 at 2 LPM via NC. Pt continues to have chronic back pain managed with scheduled meds this shift. Sacral mepilex on R buttock abrasion. Pt uses call light appropriately and able to make needs known. No acute changes this shift.

## 2025-01-23 PROCEDURE — 999N000157 HC STATISTIC RCP TIME EA 10 MIN

## 2025-01-23 PROCEDURE — 250N000009 HC RX 250

## 2025-01-23 PROCEDURE — 250N000013 HC RX MED GY IP 250 OP 250 PS 637: Performed by: PHYSICIAN ASSISTANT

## 2025-01-23 PROCEDURE — 94640 AIRWAY INHALATION TREATMENT: CPT | Mod: 76

## 2025-01-23 PROCEDURE — 250N000013 HC RX MED GY IP 250 OP 250 PS 637: Performed by: STUDENT IN AN ORGANIZED HEALTH CARE EDUCATION/TRAINING PROGRAM

## 2025-01-23 PROCEDURE — 250N000013 HC RX MED GY IP 250 OP 250 PS 637: Performed by: INTERNAL MEDICINE

## 2025-01-23 PROCEDURE — 99232 SBSQ HOSP IP/OBS MODERATE 35: CPT | Performed by: INTERNAL MEDICINE

## 2025-01-23 PROCEDURE — 120N000002 HC R&B MED SURG/OB UMMC

## 2025-01-23 PROCEDURE — 250N000013 HC RX MED GY IP 250 OP 250 PS 637

## 2025-01-23 RX ORDER — MORPHINE SULFATE 20 MG/ML
5 SOLUTION ORAL 4 TIMES DAILY
Status: DISCONTINUED | OUTPATIENT
Start: 2025-01-23 | End: 2025-01-25

## 2025-01-23 RX ADMIN — ROSUVASTATIN 10 MG: 10 TABLET, FILM COATED ORAL at 08:51

## 2025-01-23 RX ADMIN — GABAPENTIN 400 MG: 300 CAPSULE ORAL at 20:22

## 2025-01-23 RX ADMIN — GABAPENTIN 400 MG: 300 CAPSULE ORAL at 08:51

## 2025-01-23 RX ADMIN — MORPHINE SULFATE 5 MG: 20 SOLUTION ORAL at 15:45

## 2025-01-23 RX ADMIN — MORPHINE SULFATE 5 MG: 20 SOLUTION ORAL at 20:22

## 2025-01-23 RX ADMIN — Medication 1 HALF-TAB: at 20:22

## 2025-01-23 RX ADMIN — MORPHINE SULFATE 5 MG: 20 SOLUTION ORAL at 08:51

## 2025-01-23 RX ADMIN — IPRATROPIUM BROMIDE AND ALBUTEROL SULFATE 3 ML: .5; 3 SOLUTION RESPIRATORY (INHALATION) at 20:36

## 2025-01-23 RX ADMIN — GABAPENTIN 400 MG: 300 CAPSULE ORAL at 13:54

## 2025-01-23 RX ADMIN — METOPROLOL SUCCINATE 50 MG: 50 TABLET, EXTENDED RELEASE ORAL at 08:51

## 2025-01-23 RX ADMIN — CARBIDOPA AND LEVODOPA 1 TABLET: 25; 100 TABLET ORAL at 13:54

## 2025-01-23 RX ADMIN — IPRATROPIUM BROMIDE AND ALBUTEROL SULFATE 3 ML: .5; 3 SOLUTION RESPIRATORY (INHALATION) at 08:17

## 2025-01-23 RX ADMIN — IPRATROPIUM BROMIDE AND ALBUTEROL SULFATE 3 ML: .5; 3 SOLUTION RESPIRATORY (INHALATION) at 17:38

## 2025-01-23 RX ADMIN — MORPHINE SULFATE 5 MG: 20 SOLUTION ORAL at 13:54

## 2025-01-23 RX ADMIN — Medication 1 HALF-TAB: at 13:54

## 2025-01-23 RX ADMIN — LEVOTHYROXINE SODIUM 112 MCG: 112 TABLET ORAL at 08:51

## 2025-01-23 RX ADMIN — CARBIDOPA AND LEVODOPA 1 TABLET: 25; 100 TABLET ORAL at 20:22

## 2025-01-23 RX ADMIN — Medication 1 HALF-TAB: at 08:51

## 2025-01-23 RX ADMIN — CARBIDOPA AND LEVODOPA 1 TABLET: 25; 100 TABLET ORAL at 08:51

## 2025-01-23 ASSESSMENT — ACTIVITIES OF DAILY LIVING (ADL)
ADLS_ACUITY_SCORE: 45
ADLS_ACUITY_SCORE: 44
ADLS_ACUITY_SCORE: 45
ADLS_ACUITY_SCORE: 44
ADLS_ACUITY_SCORE: 44
ADLS_ACUITY_SCORE: 45
ADLS_ACUITY_SCORE: 44
ADLS_ACUITY_SCORE: 44
ADLS_ACUITY_SCORE: 45
ADLS_ACUITY_SCORE: 44
ADLS_ACUITY_SCORE: 45
ADLS_ACUITY_SCORE: 44
ADLS_ACUITY_SCORE: 44

## 2025-01-23 NOTE — PROGRESS NOTES
"[facility]    Medicine Progress Note - Hospitalist Service, GOLD TEAM 17    Date of Admission:  1/4/2025    Assessment & Plan   jeannie is a 78 y/o man who has a past medical history significant for thyroid cancer with lung metastases, Parkinson's disease, neuropathy, glaucoma, osteoporosis, chronic pain, severe COPD, hyperlipidemia, obstructive sleep apnea and coronary artery disease. Patient presented on 04-Jan-2025 with a 7 day history of productive cough and a 1 day history of weakness. CT pulmonary angiogram was negative for pulmonary embolism. Patient appears to have COPD exacerbation but there is concern for community-acquired pneumonia as well. There is also concern that patient was in early sepsis on presentation.      Patient reportedly saw Pulmonology in Oct-2024 and COPD was moderately well-controlled at that time. Activity was apparently limited much more by his balance and parkinsonism than by respiratory status. Patient has antibiotics and prednisone available for outpatient COPD flares but apparently has not recently used this.      Patient apparently just entered hospice in the past week prior to hospital admission. Patient stated on admission that he was too weak and he and his daughter called their hospice agency and 911 was called and he was brought into the hospital. When provider asked on day of presentation whether patient wished for hospitalization and treatment of his potential infection, patient stated that he was seeking treatment and reported that his goal was to \"be able to do things for myself\". It was discussed that hospice typically focused on comfort rather than diagnostic testing and aggressive medical treatment. Patient acknowledged this but had seemed unsure of what his true desires for his care are moving forward.      Patient was on ceftriaxone from 04-Jan-2025 to 08-Jan-2025. Patient was azithromycin 500 mg from 05-Jan-2025 to " 07-Jan-2025. Patient was on zosyn from 09-Jan-2025 to 14-Jan-2025.      Patient's overall prognosis is poor. Patient has been seen by palliative care     1/23  Awaits placement in LTC with hospice   Silvia palliative input and SW help . Spoke with SW 1/22. No bed availability yet   Increase scheduled morphine 5 mg TID to 5 mg QID as patient does not always request for morphine and states bottom hurts        P:  1.) Pneumonia: completed abx  - Patient initially was treated for a possible community-acquired pneumonia on admission with ceftriaxone and azithromycin.  - Patient subsequently was treated for possible hospital-acquired pneumonia with zosyn.       2.) COPD exacerbation:  - Patient was on prednisone 40 mg daily from 04-Jan-2025 to 08-Jan-2025. Patient received prednisone 20 mg once on 09-Jan-2025 but was then on methylprednisolone 40 mg IV every 8 hours from 09-Jan-2025 yo 13-Jan-2025.   Patient is not on steroids any more  - Patient on azithromycin 500 mg every other day for now.     3.) Chronic hypoxemic respiratory failure:  - Patient usually on 1-2 litres per minute of supplemental oxygen at baseline.  - Patient patient's daughter, patient may be placed on BiPAP if needed.     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  - Further surveillance as outpatient.     5.) Right upper lobe lung mass s/p radiation therapy with possible radiation pneumonitis:  - Patient had previous VATS biopsy in Feb-2022 that was non-diagnostic.     6.) Post-surgical hypothyroidism:  - Patient on levothyroxine 112 mcg daily.     7.) Rheumatoid arthritis with positive rheumatoid factor:  - Per chart review, patient had once been on methotrexate and prednisone but had stopped these medications a few years ago.  - Patient to f/u as outpatient if desired.     8.) Coronary artery disease with past MI s/p stent placement:  - Patient usually on aspirin and crestor but  these are currently on hold.  - Patient continuing metoprolol succinate 50 mg daily.      9.) Hypertension:  - Monitoring on metoprolol succinate.  - Patient had been on lasix but this is on hold in light of hypernatremia.     10.) Hypernatremia, improved:  - Monitoring for symptoms.     11.) Hyperlipidemia:  - Patient usually on crestor but this is on hold.     12.) Parkinson's disease:  - Patient on sinemet three times a day.     13.) Neuropathy, chronic pain syndrome:  - Patient currently on gabapentin 400 mg three times a day.  - Patient usually on methocarbamol but this is currently on hold.     14.) Glaucoma:  - Patient not currently on medication for this.  - Patient to f/u with Ophthalmology as outpatient if further treatment is desired.     15.) Osteoporosis:  - Patient on prolia as outpatient.     16.) Acute thrombocytopenia:  - Monitoring for bleeding.     17.) History of subarachnoid hemorrhage in 2023:  - No active intervention indicated.     18.) Moderate malnutrition in the context of acute vs. chronic illness:  - Supplementing as able.              Diet: Snacks/Supplements Adult: Ensure Enlive; With Meals  Regular Diet Adult Thin Liquids (level 0)    DVT Prophylaxis: PCD  Mir Catheter: Not present  Lines: None     Cardiac Monitoring: None  Code Status: No CPR- Do NOT Intubate      Clinically Significant Risk Factors                   # Hypertension: Noted on problem list     # Acute Hypoxic Respiratory Failure: Documented O2 saturation < 90%. Continue supplemental oxygen as needed          # Moderate Malnutrition: based on nutrition assessment    # Financial/Environmental Concerns:           Social Drivers of Health    Tobacco Use: Medium Risk (10/18/2024)    Patient History     Smoking Tobacco Use: Former     Smokeless Tobacco Use: Never   Physical Activity: Inactive (10/14/2024)    Exercise Vital Sign     Days of Exercise per Week: 0 days     Minutes of Exercise per Session: 0 min   Stress:  Stress Concern Present (10/14/2024)    [employer] - Occupational Stress Questionnaire     Feeling of Stress : Very much   Social Connections: Unknown (10/14/2024)    Social Connection and Isolation Panel [NHANES]     Frequency of Social Gatherings with Friends and Family: More than three times a week          Disposition Plan     Medically Ready for Discharge: Anticipated in 5+ Days             Samantha Niño MD  Hospitalist Service, GOLD TEAM 17  M [facility]  Securely message with [employer] (more info)  Text page via [employer] Paging/Directory   See signed in provider for up to date coverage information  ______________________________________________________________________    Interval History   Pain in botteom when he sits on it   Otherwise on acuet issues  Minimal exertion causes sob   No fever   Likes to eat sweet things     Physical Exam   Vital Signs: Temp: 97.9  F (36.6  C) Temp src: Oral BP: 137/70 Pulse: 77   Resp: 16 SpO2: 98 % O2 Device: Nasal cannula Oxygen Delivery: 2 LPM  Weight: 161 lbs 9.55 oz         Alert and oriented . In no acute distress .  Chest ; Breath sounds are reduced BL  Cardiovascular Exam ; S1 & S2 .  GI ; Abdomen is soft and non tender. BS positive .  Psych ; Silvia mood & affect.    Medical Decision Making       45 MINUTES SPENT BY ME on the date of service doing chart review, history, exam, documentation & further activities per the note.      Data

## 2025-01-23 NOTE — PLAN OF CARE
[id number]    Goal Outcome Evaluation:       Plan of Care Reviewed With: patient     Overall Patient Progress: improvingOverall Patient Progress: improving     VS: /63 (BP Location: Left arm)   Pulse 84   Temp 98.3  F (36.8  C) (Oral)   Resp (!) 17   Wt 73.3 kg (161 lb 9.6 oz)   SpO2 96%   BMI 24.21 kg/m     O2: 2L O2 NC   Output: Continent of bowel and bladder   Last BM: 01/20   Activity: A1 walker, refuses GB   Skin: Abrasion: coccyx (Mepilex)  Scattered bruising  Mepilex on L arm/Mary area   Pain: Denied pain during late evening   CMS: A&Ox4.   Dressing: CDI   Diet: Reg   LDA: R PIV    Equipment: IV pole, personal belongings.   Plan: Continue with POC   Additional Info:

## 2025-01-23 NOTE — PLAN OF CARE
Goal Outcome Evaluation:      Plan of Care Reviewed With: patient    Overall Patient Progress: improvingOverall Patient Progress: improving         VS: /69   Pulse 71   Temp 98.1  F (36.7  C)   Resp 16   Wt 73.3 kg (161 lb 9.6 oz)   SpO2 94%   BMI 24.21 kg/m     O2: SpO2> 90% on 2L via NC. ACEVES. Had frequent productive cough.   Output: Voiding spontaneously without difficulty   Last BM: 01/21/25   Activity: A1 with walker, refuses GB   Skin: Skin breakdown in upper thigh/janes area. Mepilex in place.  Scattered bruising.  Mepilex on LB arm.   Pain: Pain managed with scheduled morphine and pillow support   CMS: A&O x 4   Dressing: CDI   Diet: Regular diet   LDA: R PIV SL   Equipment: IV pole, personal belongings   Plan: TBD   Additional Info:

## 2025-01-24 PROCEDURE — 94640 AIRWAY INHALATION TREATMENT: CPT | Mod: 76

## 2025-01-24 PROCEDURE — 250N000013 HC RX MED GY IP 250 OP 250 PS 637

## 2025-01-24 PROCEDURE — 120N000002 HC R&B MED SURG/OB UMMC

## 2025-01-24 PROCEDURE — 250N000013 HC RX MED GY IP 250 OP 250 PS 637: Performed by: INTERNAL MEDICINE

## 2025-01-24 PROCEDURE — 999N000157 HC STATISTIC RCP TIME EA 10 MIN

## 2025-01-24 PROCEDURE — 250N000009 HC RX 250

## 2025-01-24 PROCEDURE — 250N000011 HC RX IP 250 OP 636: Performed by: INTERNAL MEDICINE

## 2025-01-24 PROCEDURE — 99233 SBSQ HOSP IP/OBS HIGH 50: CPT | Performed by: INTERNAL MEDICINE

## 2025-01-24 PROCEDURE — 250N000013 HC RX MED GY IP 250 OP 250 PS 637: Performed by: STUDENT IN AN ORGANIZED HEALTH CARE EDUCATION/TRAINING PROGRAM

## 2025-01-24 RX ORDER — FUROSEMIDE 10 MG/ML
40 INJECTION INTRAMUSCULAR; INTRAVENOUS ONCE
Status: COMPLETED | OUTPATIENT
Start: 2025-01-24 | End: 2025-01-24

## 2025-01-24 RX ORDER — POTASSIUM CHLORIDE 1500 MG/1
40 TABLET, EXTENDED RELEASE ORAL ONCE
Status: COMPLETED | OUTPATIENT
Start: 2025-01-24 | End: 2025-01-24

## 2025-01-24 RX ADMIN — METOPROLOL SUCCINATE 50 MG: 50 TABLET, EXTENDED RELEASE ORAL at 08:33

## 2025-01-24 RX ADMIN — GABAPENTIN 400 MG: 300 CAPSULE ORAL at 13:04

## 2025-01-24 RX ADMIN — IPRATROPIUM BROMIDE AND ALBUTEROL SULFATE 3 ML: .5; 3 SOLUTION RESPIRATORY (INHALATION) at 08:06

## 2025-01-24 RX ADMIN — MORPHINE SULFATE 5 MG: 20 SOLUTION ORAL at 08:33

## 2025-01-24 RX ADMIN — AZITHROMYCIN DIHYDRATE 500 MG: 250 TABLET ORAL at 08:33

## 2025-01-24 RX ADMIN — POTASSIUM CHLORIDE 40 MEQ: 1500 TABLET, EXTENDED RELEASE ORAL at 09:32

## 2025-01-24 RX ADMIN — CARBIDOPA AND LEVODOPA 1 TABLET: 25; 100 TABLET ORAL at 13:04

## 2025-01-24 RX ADMIN — Medication 1 HALF-TAB: at 13:04

## 2025-01-24 RX ADMIN — IPRATROPIUM BROMIDE AND ALBUTEROL SULFATE 3 ML: .5; 3 SOLUTION RESPIRATORY (INHALATION) at 12:37

## 2025-01-24 RX ADMIN — CARBIDOPA AND LEVODOPA 1 TABLET: 25; 100 TABLET ORAL at 08:33

## 2025-01-24 RX ADMIN — MORPHINE SULFATE 5 MG: 20 SOLUTION ORAL at 20:21

## 2025-01-24 RX ADMIN — IPRATROPIUM BROMIDE AND ALBUTEROL SULFATE 3 ML: .5; 3 SOLUTION RESPIRATORY (INHALATION) at 20:25

## 2025-01-24 RX ADMIN — Medication 1 HALF-TAB: at 08:33

## 2025-01-24 RX ADMIN — ROSUVASTATIN 10 MG: 10 TABLET, FILM COATED ORAL at 08:33

## 2025-01-24 RX ADMIN — FUROSEMIDE 40 MG: 10 INJECTION, SOLUTION INTRAMUSCULAR; INTRAVENOUS at 09:31

## 2025-01-24 RX ADMIN — MORPHINE SULFATE 5 MG: 20 SOLUTION ORAL at 12:59

## 2025-01-24 RX ADMIN — MORPHINE SULFATE 5 MG: 20 SOLUTION ORAL at 17:35

## 2025-01-24 RX ADMIN — LEVOTHYROXINE SODIUM 112 MCG: 112 TABLET ORAL at 08:33

## 2025-01-24 RX ADMIN — Medication 1 HALF-TAB: at 20:22

## 2025-01-24 RX ADMIN — IPRATROPIUM BROMIDE AND ALBUTEROL SULFATE 3 ML: .5; 3 SOLUTION RESPIRATORY (INHALATION) at 16:50

## 2025-01-24 RX ADMIN — GABAPENTIN 400 MG: 300 CAPSULE ORAL at 20:21

## 2025-01-24 RX ADMIN — CARBIDOPA AND LEVODOPA 1 TABLET: 25; 100 TABLET ORAL at 20:22

## 2025-01-24 RX ADMIN — GABAPENTIN 400 MG: 300 CAPSULE ORAL at 08:33

## 2025-01-24 ASSESSMENT — ACTIVITIES OF DAILY LIVING (ADL)
ADLS_ACUITY_SCORE: 45

## 2025-01-24 NOTE — PLAN OF CARE
Goal Outcome Evaluation:      Plan of Care Reviewed With: patient    Overall Patient Progress: no changeOverall Patient Progress: no change         VS: /51   Pulse 71   Temp 98.4  F (36.9  C)   Resp 16   Wt 73.3 kg (161 lb 9.6 oz)   SpO2 97%   BMI 24.21 kg/m     O2: On O2 at 2L via NC.  Reports dyspnea on exertion.  Frequent productive cough.   Output: Continent both bladder and bowel   Last BM: 1-   Activity: SBA with walker.  Refuses gait belt.   Skin: Multiple bruising and abrasion on BUE   Abrasion on coccyx   Pain: Managed with scheduled morphine   CMS: A&Ox4, with baseline numbness and tingling on BLE   Dressing: Bilateral forearm and coccyx area, CDI   Diet: Regular diet   LDA: R PIV SL   Equipment: Personal belongings, call light and IV pole   Plan: Pending placement to LTC and awaiting medical assistance   Additional Info:

## 2025-01-24 NOTE — PLAN OF CARE
VS: /64 (BP Location: Right arm)   Pulse 72   Temp 98.5  F (36.9  C) (Oral)   Resp 16   Wt 73.3 kg (161 lb 9.6 oz)   SpO2 97%   BMI 24.21 kg/m     O2: On O2 at 2 LPM via nasal cannula  With dyspnea upon exertion  Kept on high ta's position most of the shift, at HS on low ta's   Output: Continent both bowel and bladder   Last BM: 1-   Activity: SBA with 4WW  Refused to use gait belt   Skin: Multiple bruising and abrasion on BUE   Abrasion on coccyx   Pain: Low back pain, on scheduled Morphine   CMS: A&Ox4, with baseline numbness and tingling on BLE   Dressing: Bilateral forearm and coccyx area, CDI   Diet: Regular diet, forgot to order food as pt fall asleep, offered box lunch but refused   LDA: PIV on R forearm saline locked   Equipment: Personal items, call light and IV pole   Plan: Pending placement to LTC and awaiting medical assistance   Additional Info:

## 2025-01-24 NOTE — PLAN OF CARE
Pt remains A/O x4. Pt continues to have elevated BPs at baseline, otherwise vitally stable. Pt continues to deny chest pain and N/V. Pt continues to be SOB with activity, on O2 at 2 LPM via NC. Pt stand by assist with walker and refuses gait belt. Pt continues to have chronic back pain managed per MAR. Pt uses call light appropriately and able to make needs known. No acute changes overnight.      Goal Outcome Evaluation:    Plan of Care Reviewed With: patient    Overall Patient Progress: no change

## 2025-01-24 NOTE — PROGRESS NOTES
"[facility]    Medicine Progress Note - Hospitalist Service, GOLD TEAM 17    Date of Admission:  1/4/2025    Assessment & Plan   jeannie is a 78 y/o man who has a past medical history significant for thyroid cancer with lung metastases, Parkinson's disease, neuropathy, glaucoma, osteoporosis, chronic pain, severe COPD, hyperlipidemia, obstructive sleep apnea and coronary artery disease. Patient presented on 04-Jan-2025 with a 7 day history of productive cough and a 1 day history of weakness. CT pulmonary angiogram was negative for pulmonary embolism. Patient appears to have COPD exacerbation but there is concern for community-acquired pneumonia as well. There is also concern that patient was in early sepsis on presentation.      Patient reportedly saw Pulmonology in Oct-2024 and COPD was moderately well-controlled at that time. Activity was apparently limited much more by his balance and parkinsonism than by respiratory status. Patient has antibiotics and prednisone available for outpatient COPD flares but apparently has not recently used this.      Patient apparently just entered hospice in the past week prior to hospital admission. Patient stated on admission that he was too weak and he and his daughter called their hospice agency and 911 was called and he was brought into the hospital. When provider asked on day of presentation whether patient wished for hospitalization and treatment of his potential infection, patient stated that he was seeking treatment and reported that his goal was to \"be able to do things for myself\". It was discussed that hospice typically focused on comfort rather than diagnostic testing and aggressive medical treatment. Patient acknowledged this but had seemed unsure of what his true desires for his care are moving forward.      Patient was on ceftriaxone from 04-Jan-2025 to 08-Jan-2025. Patient was azithromycin 500 mg from 05-Jan-2025 to " 07-Jan-2025. Patient was on zosyn from 09-Jan-2025 to 14-Jan-2025.      Patient's overall prognosis is poor. Patient has been seen by palliative care     1/24  Breathing sounds a bit wet   Lasix 40 mg IV once and potassium   Awaits placement in LTC with hospice   Silvia palliative input and SW help . Spoke with SW 1/22 and palliative care 1/23   No bed availability yet   Continue supportive cares         P:  1.) Pneumonia: completed abx  - Patient initially was treated for a possible community-acquired pneumonia on admission with ceftriaxone and azithromycin.  - Patient subsequently was treated for possible hospital-acquired pneumonia with zosyn.       2.) COPD exacerbation:  - Patient was on prednisone 40 mg daily from 04-Jan-2025 to 08-Jan-2025. Patient received prednisone 20 mg once on 09-Jan-2025 but was then on methylprednisolone 40 mg IV every 8 hours from 09-Jan-2025 yo 13-Jan-2025.   Patient is not on steroids any more  - Patient on azithromycin 500 mg every other day for now.     3.) Chronic hypoxemic respiratory failure:  - Patient usually on 1-2 litres per minute of supplemental oxygen at baseline.  - Patient patient's daughter, patient may be placed on BiPAP if needed.     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  - Further surveillance as outpatient.     5.) Right upper lobe lung mass s/p radiation therapy with possible radiation pneumonitis:  - Patient had previous VATS biopsy in Feb-2022 that was non-diagnostic.     6.) Post-surgical hypothyroidism:  - Patient on levothyroxine 112 mcg daily.     7.) Rheumatoid arthritis with positive rheumatoid factor:  - Per chart review, patient had once been on methotrexate and prednisone but had stopped these medications a few years ago.  - Patient to f/u as outpatient if desired.     8.) Coronary artery disease with past MI s/p stent placement:  - Patient usually on aspirin and crestor but  these are currently on hold.  - Patient continuing metoprolol succinate 50 mg daily.      9.) Hypertension:  - Monitoring on metoprolol succinate.  - Patient had been on lasix but this is on hold in light of hypernatremia.     10.) Hypernatremia, improved:  - Monitoring for symptoms.     11.) Hyperlipidemia:  - Patient usually on crestor but this is on hold.     12.) Parkinson's disease:  - Patient on sinemet three times a day.     13.) Neuropathy, chronic pain syndrome:  - Patient currently on gabapentin 400 mg three times a day.  - Patient usually on methocarbamol but this is currently on hold.     14.) Glaucoma:  - Patient not currently on medication for this.  - Patient to f/u with Ophthalmology as outpatient if further treatment is desired.     15.) Osteoporosis:  - Patient on prolia as outpatient.     16.) Acute thrombocytopenia:  - Monitoring for bleeding.     17.) History of subarachnoid hemorrhage in 2023:  - No active intervention indicated.     18.) Moderate malnutrition in the context of acute vs. chronic illness:  - Supplementing as able.              Diet: Snacks/Supplements Adult: Ensure Enlive; With Meals  Regular Diet Adult Thin Liquids (level 0)    DVT Prophylaxis: PCD  Mir Catheter: Not present  Lines: None     Cardiac Monitoring: None  Code Status: No CPR- Do NOT Intubate      Clinically Significant Risk Factors                   # Hypertension: Noted on problem list     # Acute Hypoxic Respiratory Failure: Documented O2 saturation < 90%. Continue supplemental oxygen as needed          # Moderate Malnutrition: based on nutrition assessment    # Financial/Environmental Concerns:           Social Drivers of Health    Tobacco Use: Medium Risk (10/18/2024)    Patient History     Smoking Tobacco Use: Former     Smokeless Tobacco Use: Never   Physical Activity: Inactive (10/14/2024)    Exercise Vital Sign     Days of Exercise per Week: 0 days     Minutes of Exercise per Session: 0 min   Stress:  Stress Concern Present (10/14/2024)    [employer] - Occupational Stress Questionnaire     Feeling of Stress : Very much   Social Connections: Unknown (10/14/2024)    Social Connection and Isolation Panel [NHANES]     Frequency of Social Gatherings with Friends and Family: More than three times a week          Disposition Plan     Medically Ready for Discharge: Anticipated in 5+ Days             Samantha Niño MD  Hospitalist Service, GOLD TEAM 17  M [facility]  Securely message with [employer] (more info)  Text page via [facility] Paging/Directory   See signed in provider for up to date coverage information  ______________________________________________________________________    Interval History   No new complaints   Feels cough is bit wet today a  Annoyed by alarms beeping and wants them to shut off   Get out of breath with minimal exertion     Physical Exam   Vital Signs: Temp: 98.5  F (36.9  C) Temp src: Oral BP: 131/64 Pulse: 72   Resp: 16 SpO2: 97 % O2 Device: Nasal cannula Oxygen Delivery: 2 LPM  Weight: 161 lbs 9.55 oz    Alert and interactive   Chest ; reduced BD BL and crackles   CVS ; RRR  GI ; soft , BS positive   Medical Decision Making       51 MINUTES SPENT BY ME on the date of service doing chart review, history, exam, documentation & further activities per the note.      Data

## 2025-01-24 NOTE — PROGRESS NOTES
Palliative Care Note    Called patient's daughter, Janey this afternoon to check in. She mentioned she is not sure what the plan is moving forward. She asked if her dad has improved, if that is why he has not been able to go to [facility]. Janey states she has not heard any updates from the team this week. I explained that I am not certain what the discharge plans are but that I would follow-up on that.     She states she was able to see her dad Wednesday. She felt he was looking better, was able to walk around the unit. She felt his symptoms were controlled.     PLAN:   - Appreciate unit SW following up with Janey on updates to dispo.   - Sounds like she still would like him to discharge with Hospice.   - [facility] referral was submitted at one point - lack of his medical assistance should not be a barrier to admission here given it is [facility]. After discussion with unit SW on today, [facility] declined as they are prioritizing other patients who are sicker.   - Recent SW notes mention waiting for medical assistance to be active.     Discussed above with hospitalist.     Palliative care will sign off at this time. Please reach out if additional needs arise that we can assist with.     This is a non-billable note.     PREETI Veliz CNP  Securely message with [employer] (more info)  Text page via [employer] Paging/Directory

## 2025-01-25 PROCEDURE — 99233 SBSQ HOSP IP/OBS HIGH 50: CPT | Performed by: INTERNAL MEDICINE

## 2025-01-25 PROCEDURE — 94640 AIRWAY INHALATION TREATMENT: CPT | Mod: 76

## 2025-01-25 PROCEDURE — 250N000013 HC RX MED GY IP 250 OP 250 PS 637

## 2025-01-25 PROCEDURE — 250N000013 HC RX MED GY IP 250 OP 250 PS 637: Performed by: INTERNAL MEDICINE

## 2025-01-25 PROCEDURE — 250N000013 HC RX MED GY IP 250 OP 250 PS 637: Performed by: STUDENT IN AN ORGANIZED HEALTH CARE EDUCATION/TRAINING PROGRAM

## 2025-01-25 PROCEDURE — 250N000009 HC RX 250

## 2025-01-25 PROCEDURE — 999N000157 HC STATISTIC RCP TIME EA 10 MIN

## 2025-01-25 PROCEDURE — 94640 AIRWAY INHALATION TREATMENT: CPT

## 2025-01-25 PROCEDURE — 120N000002 HC R&B MED SURG/OB UMMC

## 2025-01-25 RX ORDER — MORPHINE SULFATE 20 MG/ML
5 SOLUTION ORAL
Status: DISCONTINUED | OUTPATIENT
Start: 2025-01-25 | End: 2025-02-06

## 2025-01-25 RX ADMIN — IPRATROPIUM BROMIDE AND ALBUTEROL SULFATE 3 ML: .5; 3 SOLUTION RESPIRATORY (INHALATION) at 20:40

## 2025-01-25 RX ADMIN — IPRATROPIUM BROMIDE AND ALBUTEROL SULFATE 3 ML: .5; 3 SOLUTION RESPIRATORY (INHALATION) at 08:03

## 2025-01-25 RX ADMIN — GABAPENTIN 400 MG: 300 CAPSULE ORAL at 08:06

## 2025-01-25 RX ADMIN — MORPHINE SULFATE 5 MG: 20 SOLUTION ORAL at 10:57

## 2025-01-25 RX ADMIN — Medication 1 HALF-TAB: at 14:22

## 2025-01-25 RX ADMIN — CARBIDOPA AND LEVODOPA 1 TABLET: 25; 100 TABLET ORAL at 08:06

## 2025-01-25 RX ADMIN — IPRATROPIUM BROMIDE AND ALBUTEROL SULFATE 3 ML: .5; 3 SOLUTION RESPIRATORY (INHALATION) at 16:17

## 2025-01-25 RX ADMIN — LEVOTHYROXINE SODIUM 112 MCG: 112 TABLET ORAL at 08:07

## 2025-01-25 RX ADMIN — CARBIDOPA AND LEVODOPA 1 TABLET: 25; 100 TABLET ORAL at 19:45

## 2025-01-25 RX ADMIN — ROSUVASTATIN 10 MG: 10 TABLET, FILM COATED ORAL at 08:06

## 2025-01-25 RX ADMIN — GABAPENTIN 400 MG: 300 CAPSULE ORAL at 14:22

## 2025-01-25 RX ADMIN — METOPROLOL SUCCINATE 50 MG: 50 TABLET, EXTENDED RELEASE ORAL at 08:06

## 2025-01-25 RX ADMIN — CARBIDOPA AND LEVODOPA 1 TABLET: 25; 100 TABLET ORAL at 14:22

## 2025-01-25 RX ADMIN — CARBOXYMETHYLCELLULOSE SODIUM 1 DROP: 5 SOLUTION/ DROPS OPHTHALMIC at 19:45

## 2025-01-25 RX ADMIN — Medication 1 HALF-TAB: at 19:45

## 2025-01-25 RX ADMIN — Medication 1 HALF-TAB: at 08:06

## 2025-01-25 RX ADMIN — GABAPENTIN 400 MG: 300 CAPSULE ORAL at 19:45

## 2025-01-25 RX ADMIN — MORPHINE SULFATE 5 MG: 20 SOLUTION ORAL at 08:05

## 2025-01-25 RX ADMIN — MORPHINE SULFATE 5 MG: 20 SOLUTION ORAL at 14:22

## 2025-01-25 RX ADMIN — IPRATROPIUM BROMIDE AND ALBUTEROL SULFATE 3 ML: .5; 3 SOLUTION RESPIRATORY (INHALATION) at 12:00

## 2025-01-25 RX ADMIN — MORPHINE SULFATE 5 MG: 20 SOLUTION ORAL at 22:08

## 2025-01-25 ASSESSMENT — ACTIVITIES OF DAILY LIVING (ADL)
ADLS_ACUITY_SCORE: 45

## 2025-01-25 NOTE — PLAN OF CARE
VS: /69 (BP Location: Left arm)   Pulse 75   Temp 98.1  F (36.7  C) (Oral)   Resp 18   Wt 72.3 kg (159 lb 6.4 oz)   SpO2 95%   BMI 23.88 kg/m     O2: 2 LPM via nasal cannula, with dyspnea upon exertion at baseline   Output: Continent both bowel and bladder   Last BM: 1-   Activity: As per hand over SBA with 4WW  Seen pt walking independently in room, refused assistance   Skin: Scattered bruising, scabbing, abrasion on BUE   Pain: On scheduled morphine   CMS: A&Ox4, with baseline numbness and tingling on BLE   Dressing: BUE CDI   Diet: Regular diet   LDA: PIV on R forearm saline locked   Equipment: Personal items, call light and IV pole   Plan: Awaiting medical assistance and LTC placement   Additional Info:

## 2025-01-25 NOTE — PROGRESS NOTES
"[facility]    Medicine Progress Note - Hospitalist Service, GOLD TEAM 17    Date of Admission:  1/4/2025    Assessment & Plan   jeannie is a 80 y/o man who has a past medical history significant for thyroid cancer with lung metastases, Parkinson's disease, neuropathy, glaucoma, osteoporosis, chronic pain, severe COPD, hyperlipidemia, obstructive sleep apnea and coronary artery disease. Patient presented on 04-Jan-2025 with a 7 day history of productive cough and a 1 day history of weakness. CT pulmonary angiogram was negative for pulmonary embolism. Patient appears to have COPD exacerbation but there is concern for community-acquired pneumonia as well. There is also concern that patient was in early sepsis on presentation.      Patient reportedly saw Pulmonology in Oct-2024 and COPD was moderately well-controlled at that time. Activity was apparently limited much more by his balance and parkinsonism than by respiratory status. Patient has antibiotics and prednisone available for outpatient COPD flares but apparently has not recently used this.      Patient apparently just entered hospice in the past week prior to hospital admission. Patient stated on admission that he was too weak and he and his daughter called their hospice agency and 911 was called and he was brought into the hospital. When provider asked on day of presentation whether patient wished for hospitalization and treatment of his potential infection, patient stated that he was seeking treatment and reported that his goal was to \"be able to do things for myself\". It was discussed that hospice typically focused on comfort rather than diagnostic testing and aggressive medical treatment. Patient acknowledged this but had seemed unsure of what his true desires for his care are moving forward.      Patient was on ceftriaxone from 04-Jan-2025 to 08-Jan-2025. Patient was azithromycin 500 mg from 05-Jan-2025 to " 07-Jan-2025. Patient was on zosyn from 09-Jan-2025 to 14-Jan-2025.      Patient's overall prognosis is poor. Patient has been seen by palliative care     1/25  Patient is getting benefit form 4 times morphine scheduled ( less air hunger , less pain in bottom )   Would like it to be scheduled for 5 times daily   Restart po lasix with potassium . He thinks IV lasix helped him yesterday   He is thankful for care provided  Awaits placement in LTC with hospice   [facility] palliative input and SW help . Spoke with SW 1/22 and palliative care 1/23 and 1/24   No bed availability yet   Continue supportive cares         P:  1.) Pneumonia: completed abx  - Patient initially was treated for a possible community-acquired pneumonia on admission with ceftriaxone and azithromycin.  - Patient subsequently was treated for possible hospital-acquired pneumonia with zosyn.       2.) COPD exacerbation:  - Patient was on prednisone 40 mg daily from 04-Jan-2025 to 08-Jan-2025. Patient received prednisone 20 mg once on 09-Jan-2025 but was then on methylprednisolone 40 mg IV every 8 hours from 09-Jan-2025 yo 13-Jan-2025.   Patient is not on steroids any more  - Patient on azithromycin 500 mg every other day for now.     3.) Chronic hypoxemic respiratory failure:  - Patient usually on 1-2 litres per minute of supplemental oxygen at baseline.  - Patient patient's daughter, patient may be placed on BiPAP if needed.     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  - Further surveillance as outpatient.     5.) Right upper lobe lung mass s/p radiation therapy with possible radiation pneumonitis:  - Patient had previous VATS biopsy in Feb-2022 that was non-diagnostic.     6.) Post-surgical hypothyroidism:  - Patient on levothyroxine 112 mcg daily.     7.) Rheumatoid arthritis with positive rheumatoid factor:  - Per chart review, patient had once been on methotrexate and prednisone but  had stopped these medications a few years ago.  - Patient to f/u as outpatient if desired.     8.) Coronary artery disease with past MI s/p stent placement:  - Patient usually on aspirin and crestor but these are currently on hold.  - Patient continuing metoprolol succinate 50 mg daily.      9.) Hypertension:  - Monitoring on metoprolol succinate.  - Patient had been on lasix but this is on hold in light of hypernatremia.     10.) Hypernatremia, improved:  - Monitoring for symptoms.     11.) Hyperlipidemia:  - Patient usually on crestor but this is on hold.     12.) Parkinson's disease:  - Patient on sinemet three times a day.     13.) Neuropathy, chronic pain syndrome:  - Patient currently on gabapentin 400 mg three times a day.  - Patient usually on methocarbamol but this is currently on hold.     14.) Glaucoma:  - Patient not currently on medication for this.  - Patient to f/u with Ophthalmology as outpatient if further treatment is desired.     15.) Osteoporosis:  - Patient on prolia as outpatient.     16.) Acute thrombocytopenia:  - Monitoring for bleeding.     17.) History of subarachnoid hemorrhage in 2023:  - No active intervention indicated.     18.) Moderate malnutrition in the context of acute vs. chronic illness:  - Supplementing as able.              Diet: Snacks/Supplements Adult: Ensure Enlive; With Meals  Regular Diet Adult Thin Liquids (level 0)    DVT Prophylaxis: PCD  Mir Catheter: Not present  Lines: None     Cardiac Monitoring: None  Code Status: No CPR- Do NOT Intubate      Clinically Significant Risk Factors                   # Hypertension: Noted on problem list     # Acute Hypoxic Respiratory Failure: Documented O2 saturation < 90%. Continue supplemental oxygen as needed          # Moderate Malnutrition: based on nutrition assessment    # Financial/Environmental Concerns:           Social Drivers of Health    Tobacco Use: Medium Risk (10/18/2024)    Patient History     Smoking Tobacco Use:  Former     Smokeless Tobacco Use: Never   Physical Activity: Inactive (10/14/2024)    Exercise Vital Sign     Days of Exercise per Week: 0 days     Minutes of Exercise per Session: 0 min   Stress: Stress Concern Present (10/14/2024)    [employer] [place] [employer] - Occupational Stress Questionnaire     Feeling of Stress : Very much   Social Connections: Unknown (10/14/2024)    Social Connection and Isolation Panel [NHANES]     Frequency of Social Gatherings with Friends and Family: More than three times a week          Disposition Plan     Medically Ready for Discharge: Ready Now             Samantha Niño MD  Hospitalist Service, GOLD TEAM 17  [facility]  Securely message with [employer] (more info)  Text page via [facility] Paging/Directory   See signed in provider for up to date coverage information  ______________________________________________________________________    Interval History   No new symptoms reported per nursing staff .  Sob with minimal activity   No Fever   No vomiting   No difficulty with voiding   Passing gas .  Tolerating diet     4 system ROS reviewed .     Physical Exam   Vital Signs: Temp: 98.3  F (36.8  C) Temp src: Oral BP: 118/69 Pulse: 84   Resp: 17 SpO2: 97 % O2 Device: Nasal cannula Oxygen Delivery: 1 LPM  Weight: 161 lbs 9.55 oz    Alert and oriented   Neck ; supple  Chest ; BL reduced BS   CVS ' RRR  Medical Decision Making       51 MINUTES SPENT BY ME on the date of service doing chart review, history, exam, documentation & further activities per the note.      Data

## 2025-01-25 NOTE — PLAN OF CARE
Goal Outcome Evaluation:      Plan of Care Reviewed With: patient    Overall Patient Progress: improvingOverall Patient Progress: improving    Outcome Evaluation: pt is A&O x4 with call light in [facility] and is able to make needs known. reports SOB with exertion and when speaking. SpO2>92% on 2L via NC, pt reports he uses 1-2L at home at baseline. also reports infrequent, productive cough. lower back kpain managed with scheduled oral morphine. discharge pending. continue POC      VS: /60   Pulse 86   Temp 98.3  F (36.8  C) (Oral)   Resp 17   Wt 73.3 kg (161 lb 9.6 oz)   SpO2 95%   BMI 24.21 kg/m       O2: SpO2>92% on 2L via NC, pt reports SOB with exertion and when speaking, has productive cough--per report this is pt's baseline   Output: Voiding in bathroom or bedside urinal without difficulty   Last BM: 1/21/2025   Activity: SBA with walker, pt declines to use gait belt   Skin: Bruising and abrasions to BUE, abrasion on sacrum/coccyx   Pain: Lower back pain managed with scheduled morphine   CMS: A&O x4, baseline N/T to BLE   Dressing: Bilateral forearm and sacral mepilexes are CDI   Diet: Regular diet, tolerating well, denies nausea   LDA: R PIV SL   Equipment: IV pole, call light, O2 tubing and extension, pulse-oximeter (portable), walker, personal belongings   Plan: Pending MA approval and LTC placement   Additional Info:

## 2025-01-25 NOTE — PLAN OF CARE
Goal Outcome Evaluation:      Plan of Care Reviewed With: patient    Overall Patient Progress: improvingOverall Patient Progress: improving       VS: /69 (BP Location: Left arm)   Pulse 84   Temp 98.3  F (36.8  C) (Oral)   Resp 17   Wt 73.3 kg (161 lb 9.6 oz)   SpO2 97%   BMI 24.21 kg/m      O2: > 88% on 2L via NC, reports SOB on exertion and while speaking - baseline    Output: Voids spontaneously and adequately    Last BM: 1/25/25   Activity: Ax1 with walker, refuses gait belt    Skin: Bruises and abrasion to BUE, abrasion on coccyx   Pain: Pain managed with scheduled morphine    CMS: AO x4, reports numbness and tingling to BLE - baseline    Dressing: Bilateral forearm and sacrum mepilex CDI   Diet: Regular   LDA: R PIV SL   Equipment: IV pole, call light, walker and personal belongings    Plan: Pending MA approval and LTC placement    Additional Info:

## 2025-01-26 PROCEDURE — 250N000013 HC RX MED GY IP 250 OP 250 PS 637

## 2025-01-26 PROCEDURE — 999N000157 HC STATISTIC RCP TIME EA 10 MIN

## 2025-01-26 PROCEDURE — 94640 AIRWAY INHALATION TREATMENT: CPT | Mod: 76

## 2025-01-26 PROCEDURE — 250N000009 HC RX 250

## 2025-01-26 PROCEDURE — 99232 SBSQ HOSP IP/OBS MODERATE 35: CPT | Performed by: INTERNAL MEDICINE

## 2025-01-26 PROCEDURE — 250N000013 HC RX MED GY IP 250 OP 250 PS 637: Performed by: STUDENT IN AN ORGANIZED HEALTH CARE EDUCATION/TRAINING PROGRAM

## 2025-01-26 PROCEDURE — 94640 AIRWAY INHALATION TREATMENT: CPT

## 2025-01-26 PROCEDURE — 250N000013 HC RX MED GY IP 250 OP 250 PS 637: Performed by: INTERNAL MEDICINE

## 2025-01-26 PROCEDURE — 120N000002 HC R&B MED SURG/OB UMMC

## 2025-01-26 RX ADMIN — GABAPENTIN 400 MG: 300 CAPSULE ORAL at 14:08

## 2025-01-26 RX ADMIN — CARBIDOPA AND LEVODOPA 1 TABLET: 25; 100 TABLET ORAL at 08:26

## 2025-01-26 RX ADMIN — IPRATROPIUM BROMIDE AND ALBUTEROL SULFATE 3 ML: .5; 3 SOLUTION RESPIRATORY (INHALATION) at 09:20

## 2025-01-26 RX ADMIN — GABAPENTIN 400 MG: 300 CAPSULE ORAL at 19:59

## 2025-01-26 RX ADMIN — CARBIDOPA AND LEVODOPA 1 TABLET: 25; 100 TABLET ORAL at 14:08

## 2025-01-26 RX ADMIN — MORPHINE SULFATE 5 MG: 20 SOLUTION ORAL at 14:07

## 2025-01-26 RX ADMIN — METOPROLOL SUCCINATE 50 MG: 50 TABLET, EXTENDED RELEASE ORAL at 08:25

## 2025-01-26 RX ADMIN — LEVOTHYROXINE SODIUM 112 MCG: 112 TABLET ORAL at 08:25

## 2025-01-26 RX ADMIN — GABAPENTIN 400 MG: 300 CAPSULE ORAL at 08:25

## 2025-01-26 RX ADMIN — ROSUVASTATIN 10 MG: 10 TABLET, FILM COATED ORAL at 08:25

## 2025-01-26 RX ADMIN — MORPHINE SULFATE 5 MG: 20 SOLUTION ORAL at 18:50

## 2025-01-26 RX ADMIN — POTASSIUM CHLORIDE 20 MEQ: 1500 TABLET, EXTENDED RELEASE ORAL at 08:23

## 2025-01-26 RX ADMIN — Medication 1 HALF-TAB: at 08:25

## 2025-01-26 RX ADMIN — IPRATROPIUM BROMIDE AND ALBUTEROL SULFATE 3 ML: .5; 3 SOLUTION RESPIRATORY (INHALATION) at 16:21

## 2025-01-26 RX ADMIN — MORPHINE SULFATE 5 MG: 20 SOLUTION ORAL at 21:52

## 2025-01-26 RX ADMIN — Medication 1 HALF-TAB: at 19:59

## 2025-01-26 RX ADMIN — CARBIDOPA AND LEVODOPA 1 TABLET: 25; 100 TABLET ORAL at 19:59

## 2025-01-26 RX ADMIN — IPRATROPIUM BROMIDE AND ALBUTEROL SULFATE 3 ML: .5; 3 SOLUTION RESPIRATORY (INHALATION) at 19:57

## 2025-01-26 RX ADMIN — MORPHINE SULFATE 5 MG: 20 SOLUTION ORAL at 08:19

## 2025-01-26 RX ADMIN — AZITHROMYCIN DIHYDRATE 500 MG: 250 TABLET ORAL at 08:24

## 2025-01-26 RX ADMIN — FUROSEMIDE 20 MG: 20 TABLET ORAL at 08:25

## 2025-01-26 RX ADMIN — Medication 1 HALF-TAB: at 14:08

## 2025-01-26 ASSESSMENT — ACTIVITIES OF DAILY LIVING (ADL)
ADLS_ACUITY_SCORE: 45
ADLS_ACUITY_SCORE: 42
ADLS_ACUITY_SCORE: 45
ADLS_ACUITY_SCORE: 45
ADLS_ACUITY_SCORE: 42
ADLS_ACUITY_SCORE: 45
ADLS_ACUITY_SCORE: 42
ADLS_ACUITY_SCORE: 45
ADLS_ACUITY_SCORE: 45
ADLS_ACUITY_SCORE: 42
ADLS_ACUITY_SCORE: 45
ADLS_ACUITY_SCORE: 42

## 2025-01-26 NOTE — PROGRESS NOTES
"At approximately 4:45pm, writer entered pt room for assessment.   Pt is reporting a fall coming back from bathroom. He states it was \"a little bit ago\", is confident it happened after 3pm shift change. Pt reports he was attempting to close bathroom door and fell backwards onto the floor. He states that he called for help, and staff came and helped him off of floor and back into bed. Pt reports that he is uninjured.     All floor staff was asked whether they were the one(s) who helped pt up, including all NST's, charge nurse, RN's, and respiratory therapist who was in room around 4:30pm administering neb. All floor staff has denied knowledge of this fall.     Pt is alert and oriented x4. Education provided regarding calling for help when getting oob and pt reports understanding. Bed alarm ON. Provider (hospitalist) notified.   "

## 2025-01-26 NOTE — PROGRESS NOTES
"[facility]    Medicine Progress Note - Hospitalist Service, GOLD TEAM 17    Date of Admission:  1/4/2025    Assessment & Plan   jeannie is a 80 y/o man who has a past medical history significant for thyroid cancer with lung metastases, Parkinson's disease, neuropathy, glaucoma, osteoporosis, chronic pain, severe COPD, hyperlipidemia, obstructive sleep apnea and coronary artery disease. Patient presented on 04-Jan-2025 with a 7 day history of productive cough and a 1 day history of weakness. CT pulmonary angiogram was negative for pulmonary embolism. Patient appears to have COPD exacerbation but there is concern for community-acquired pneumonia as well. There is also concern that patient was in early sepsis on presentation.      Patient reportedly saw Pulmonology in Oct-2024 and COPD was moderately well-controlled at that time. Activity was apparently limited much more by his balance and parkinsonism than by respiratory status. Patient has antibiotics and prednisone available for outpatient COPD flares but apparently has not recently used this.      Patient apparently just entered hospice in the past week prior to hospital admission. Patient stated on admission that he was too weak and he and his daughter called their hospice agency and 911 was called and he was brought into the hospital. When provider asked on day of presentation whether patient wished for hospitalization and treatment of his potential infection, patient stated that he was seeking treatment and reported that his goal was to \"be able to do things for myself\". It was discussed that hospice typically focused on comfort rather than diagnostic testing and aggressive medical treatment. Patient acknowledged this but had seemed unsure of what his true desires for his care are moving forward.      Patient was on ceftriaxone from 04-Jan-2025 to 08-Jan-2025. Patient was azithromycin 500 mg from 05-Jan-2025 to " 07-Jan-2025. Patient was on zosyn from 09-Jan-2025 to 14-Jan-2025.      Patient's overall prognosis is poor. Patient has been seen by palliative care     1/26  Spoke with daughter Bharath on 1/25 and updated on medical status . She was appreciative of call   Awaits placement in LTC with hospice   [facility] palliative input and SW help . Spoke with SW 1/22 and palliative care 1/23 and 1/24   No bed availability yet   Continue supportive cares         P:  1.) Pneumonia: completed abx  - Patient initially was treated for a possible community-acquired pneumonia on admission with ceftriaxone and azithromycin.  - Patient subsequently was treated for possible hospital-acquired pneumonia with zosyn.       2.) COPD exacerbation:  - Patient was on prednisone 40 mg daily from 04-Jan-2025 to 08-Jan-2025. Patient received prednisone 20 mg once on 09-Jan-2025 but was then on methylprednisolone 40 mg IV every 8 hours from 09-Jan-2025 yo 13-Jan-2025.   Patient is not on steroids any more  - Patient on azithromycin 500 mg every other day for now.     3.) Chronic hypoxemic respiratory failure:  - Patient usually on 1-2 litres per minute of supplemental oxygen at baseline.  - Patient patient's daughter, patient may be placed on BiPAP if needed.     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  - Further surveillance as outpatient.     5.) Right upper lobe lung mass s/p radiation therapy with possible radiation pneumonitis:  - Patient had previous VATS biopsy in Feb-2022 that was non-diagnostic.     6.) Post-surgical hypothyroidism:  - Patient on levothyroxine 112 mcg daily.     7.) Rheumatoid arthritis with positive rheumatoid factor:  - Per chart review, patient had once been on methotrexate and prednisone but had stopped these medications a few years ago.  - Patient to f/u as outpatient if desired.     8.) Coronary artery disease with past MI s/p stent placement:  -  Patient usually on aspirin and crestor but these are currently on hold.  - Patient continuing metoprolol succinate 50 mg daily.      9.) Hypertension:  - Monitoring on metoprolol succinate.  -lasix      10.) Hypernatremia, improved:       11.) Hyperlipidemia:  - cerstor on hold      12.) Parkinson's disease:  - Patient on sinemet three times a day.     13.) Neuropathy, chronic pain syndrome:  - Patient currently on gabapentin 400 mg three times a day.  -     14.) Glaucoma:  - Patient not currently on medication for this.  - Patient to f/u with Ophthalmology as outpatient if further treatment is desired.     15.) Osteoporosis:  - Patient on prolia as outpatient.     16.) Acute thrombocytopenia:  PLT 72 on 1/14      17.) History of subarachnoid hemorrhage in 2023:  - No active intervention indicated.     18.) Moderate malnutrition in the context of acute vs. chronic illness:  - Supplementing as able.              Diet: Snacks/Supplements Adult: Ensure Enlive; With Meals  Regular Diet Adult Thin Liquids (level 0)    DVT Prophylaxis: PCD  Mir Catheter: Not present  Lines: None     Cardiac Monitoring: None  Code Status: No CPR- Do NOT Intubate      Clinically Significant Risk Factors                   # Hypertension: Noted on problem list     # Acute Hypoxic Respiratory Failure: Documented O2 saturation < 90%. Continue supplemental oxygen as needed          # Moderate Malnutrition: based on nutrition assessment    # Financial/Environmental Concerns:           Social Drivers of Health    Tobacco Use: Medium Risk (10/18/2024)    Patient History     Smoking Tobacco Use: Former     Smokeless Tobacco Use: Never   Physical Activity: Inactive (10/14/2024)    Exercise Vital Sign     Days of Exercise per Week: 0 days     Minutes of Exercise per Session: 0 min   Stress: Stress Concern Present (10/14/2024)    [employer] - Occupational Stress Questionnaire     Feeling of Stress : Very much   Social  Connections: Unknown (10/14/2024)    Social Connection and Isolation Panel [NHANES]     Frequency of Social Gatherings with Friends and Family: More than three times a week          Disposition Plan     Medically Ready for Discharge: Ready Now             Samantha Niño MD  Hospitalist Service, GOLD TEAM 17  [facility]  Securely message with [email or web address] (more info)  Text page via [employer] Paging/Directory   See signed in provider for up to date coverage information  ______________________________________________________________________    Interval History   Feels ok   No issues with worsening breathing   Pain control improved     Physical Exam   Vital Signs: Temp: 97.8  F (36.6  C) Temp src: Oral BP: 127/65 Pulse: 70   Resp: 20 SpO2: 98 % (refused continuous pulse oximetry monitoring) O2 Device: Nasal cannula Oxygen Delivery: 2 LPM  Weight: 159 lbs 6.4 oz  Eating oat meal and smiling .  Wearing yellow today , feeling cheerful     BS reduced BL   CVS ; RRR  GI ; soft , BS positive   Medical Decision Making       35 MINUTES SPENT BY ME on the date of service doing chart review, history, exam, documentation & further activities per the note.      Data

## 2025-01-26 NOTE — PLAN OF CARE
Goal Outcome Evaluation:      Plan of Care Reviewed With: patient    Overall Patient Progress: no changeOverall Patient Progress: no change    Outcome Evaluation: pt is A&O x4, has call light and is able to make needs known. SpO2 95% on 2L via nasal cannula. per pt report he uses 1-2L at home at baseline.oral morphine scheduled for lower back pain, now ordered 5x/day, pt declined 6pm dose. pt is SOB with exertion at baseline, lung sounds coarse, diminished with wheezing heard on expiration. discharge pending. continue POC      VS: Temp: 97.7  F (36.5  C) Temp src: Oral BP: 139/58 Pulse: 72   Resp: 18 SpO2: 98 % O2 Device: Nasal cannula Oxygen Delivery: 2 LPM     O2: SpO2 95% on 2L via nasal cannula. Pt reports dyspnea upon exertion at baseline, denies chest pain   Output: Voiding without difficulty in bathroom   Last BM: 1/25/2025   Activity: Up with SBA and walker per report, pt was seen walking in room independently, refused assistance   Skin: BUE bruising and abrasions, jose j discoloration to BUE   Pain: Lower back pain managed with scheduled morphine   CMS: A&O x4, baseline N/T to BUE   Dressing: Mepilexes on bilateral arms changed on day shift and are CDI   Diet: Regular diet, tolerating well, denies nausea   LDA: R PIV SL   Equipment: IV pole, walker, call light, personal belongings   Plan: Awaiting medical assistance and LTC placement   Additional Info:

## 2025-01-26 NOTE — PLAN OF CARE
VS: /72 (BP Location: Left arm)   Pulse 78   Temp 98.2  F (36.8  C) (Oral)   Resp 18   Wt 73 kg (160 lb 14.4 oz)   SpO2 94%   BMI 24.11 kg/m     O2: On 2 LPM via nasal cannula  Was able to tolerate without O2 at 1100  Put back O2 at 2 LPM at 1400   Output: Continent both bowel and bladder   Last BM: As per pt had BM today 1-   Activity: SBA to independent with 4WW  Seen pt walking independently in room, refused assistance    Skin: Scattered bruising, scabbing, abrasion on BUE    Pain: On scheduled morphine   Refused morphine scheduled at 1100    CMS: A&Ox4, with baseline numbness and tingling on BLE    Dressing: BUE CDI    Diet: Regular diet   LDA: PIV on R arm saline locked   Equipment: Personal items, call light   Plan: Awaiting medical assistance and LTC placement    Additional Info:

## 2025-01-26 NOTE — PROGRESS NOTES
Care Management Follow Up    Length of Stay (days): 22    Expected Discharge Date: 01/29/25     Concerns to be Addressed: discharge planning     Patient plan of care discussed at interdisciplinary rounds: Yes    Anticipated Discharge Disposition: Home, Hospice         Anticipated Discharge Services: Other (see comment) (current on home hospice)  Anticipated Discharge DME: None    Patient/family educated on Medicare website which has current facility and service quality ratings:    Education Provided on the Discharge Plan: Yes  Patient/Family in Agreement with the Plan:      Referrals Placed by CM/SW: Homecare (currently on home hospice)  Private pay costs discussed: private room/amenity fees    Discussed  Partnership in Safe Discharge Planning  document with patient/family: Yes:     Handoff Completed: No, handoff not indicated or clinically appropriate    Additional Information:  SW spoke with Pt's daughter regarding discharge planning. Pt's daughter, SW and MD will have meeting with Pt at bedside tomorrow 1/27/25 to explore LTC with hospice options. SW/RNCC will provide list of LTC facilities that accept MA pending.     Next Steps:   SW/RNCC will continue to follow for safe discharge placement and planning.     Antonio Houston, [facility] HospitalSW  [facility] [place] [facility]   Ph: [phone]

## 2025-01-26 NOTE — PLAN OF CARE
Goal Outcome Evaluation:      Plan of Care Reviewed With: patient    Overall Patient Progress: no change    VS: /65 (BP Location: Left arm, Patient Position: Fowlers, Cuff Size: Adult Regular)   Pulse 70   Temp 97.8  F (36.6  C) (Oral)   Resp 20   Wt 72.3 kg (159 lb 6.4 oz)   SpO2 98%   BMI 23.88 kg/m     O2: O2 sats maintained with 2L NC   Output: Voiding without difficulty in BR   Last BM: 1/25   Activity: Up with SBA and walker, per report patient was witnessed walking independently in room and refused assistance   Skin: BRU bruising and abrasions, jose j discoloration of BUE   Pain: Low back pain managed with rest and repositioning   CMS: A&Ox4, baseline N&T to upper extremities   Dressing: Mepilexes to bilateral arms, CDI   Diet: Regular diet   LDA: R PIV SL   Equipment: Walker, IV pole, oxygen tubing   Plan: Dc pending medical assistance and LTC placement   Additional Info: Patient able to make needs known using call light appropriately, call light in reach, continue POC, report given to oncoming RN

## 2025-01-27 PROCEDURE — 250N000009 HC RX 250: Performed by: INTERNAL MEDICINE

## 2025-01-27 PROCEDURE — 94640 AIRWAY INHALATION TREATMENT: CPT | Mod: 76

## 2025-01-27 PROCEDURE — 120N000002 HC R&B MED SURG/OB UMMC

## 2025-01-27 PROCEDURE — 250N000009 HC RX 250

## 2025-01-27 PROCEDURE — 99233 SBSQ HOSP IP/OBS HIGH 50: CPT | Performed by: INTERNAL MEDICINE

## 2025-01-27 PROCEDURE — 250N000013 HC RX MED GY IP 250 OP 250 PS 637: Performed by: STUDENT IN AN ORGANIZED HEALTH CARE EDUCATION/TRAINING PROGRAM

## 2025-01-27 PROCEDURE — 250N000013 HC RX MED GY IP 250 OP 250 PS 637

## 2025-01-27 PROCEDURE — 999N000157 HC STATISTIC RCP TIME EA 10 MIN

## 2025-01-27 PROCEDURE — 250N000013 HC RX MED GY IP 250 OP 250 PS 637: Performed by: INTERNAL MEDICINE

## 2025-01-27 RX ADMIN — GABAPENTIN 400 MG: 300 CAPSULE ORAL at 08:44

## 2025-01-27 RX ADMIN — CARBIDOPA AND LEVODOPA 1 TABLET: 25; 100 TABLET ORAL at 14:16

## 2025-01-27 RX ADMIN — IPRATROPIUM BROMIDE AND ALBUTEROL SULFATE 3 ML: .5; 3 SOLUTION RESPIRATORY (INHALATION) at 12:01

## 2025-01-27 RX ADMIN — FUROSEMIDE 20 MG: 20 TABLET ORAL at 08:44

## 2025-01-27 RX ADMIN — POTASSIUM CHLORIDE 20 MEQ: 1500 TABLET, EXTENDED RELEASE ORAL at 08:44

## 2025-01-27 RX ADMIN — GABAPENTIN 400 MG: 300 CAPSULE ORAL at 20:31

## 2025-01-27 RX ADMIN — Medication 1 HALF-TAB: at 14:15

## 2025-01-27 RX ADMIN — GABAPENTIN 400 MG: 300 CAPSULE ORAL at 14:16

## 2025-01-27 RX ADMIN — Medication 1 HALF-TAB: at 08:44

## 2025-01-27 RX ADMIN — MORPHINE SULFATE 5 MG: 20 SOLUTION ORAL at 14:16

## 2025-01-27 RX ADMIN — CARBIDOPA AND LEVODOPA 1 TABLET: 25; 100 TABLET ORAL at 08:45

## 2025-01-27 RX ADMIN — IPRATROPIUM BROMIDE AND ALBUTEROL SULFATE 3 ML: .5; 3 SOLUTION RESPIRATORY (INHALATION) at 05:07

## 2025-01-27 RX ADMIN — MORPHINE SULFATE 5 MG: 20 SOLUTION ORAL at 11:17

## 2025-01-27 RX ADMIN — MORPHINE SULFATE 5 MG: 20 SOLUTION ORAL at 22:06

## 2025-01-27 RX ADMIN — IPRATROPIUM BROMIDE AND ALBUTEROL SULFATE 3 ML: .5; 3 SOLUTION RESPIRATORY (INHALATION) at 07:51

## 2025-01-27 RX ADMIN — METOPROLOL SUCCINATE 50 MG: 50 TABLET, EXTENDED RELEASE ORAL at 08:44

## 2025-01-27 RX ADMIN — LEVOTHYROXINE SODIUM 112 MCG: 112 TABLET ORAL at 08:45

## 2025-01-27 RX ADMIN — IPRATROPIUM BROMIDE AND ALBUTEROL SULFATE 3 ML: .5; 3 SOLUTION RESPIRATORY (INHALATION) at 22:09

## 2025-01-27 RX ADMIN — MORPHINE SULFATE 5 MG: 20 SOLUTION ORAL at 08:43

## 2025-01-27 RX ADMIN — IPRATROPIUM BROMIDE AND ALBUTEROL SULFATE 3 ML: .5; 3 SOLUTION RESPIRATORY (INHALATION) at 16:00

## 2025-01-27 RX ADMIN — CARBIDOPA AND LEVODOPA 1 TABLET: 25; 100 TABLET ORAL at 20:31

## 2025-01-27 RX ADMIN — MORPHINE SULFATE 5 MG: 20 SOLUTION ORAL at 18:12

## 2025-01-27 RX ADMIN — Medication 1 HALF-TAB: at 20:31

## 2025-01-27 RX ADMIN — ROSUVASTATIN 10 MG: 10 TABLET, FILM COATED ORAL at 08:44

## 2025-01-27 RX ADMIN — IPRATROPIUM BROMIDE AND ALBUTEROL SULFATE 3 ML: .5; 3 SOLUTION RESPIRATORY (INHALATION) at 19:41

## 2025-01-27 ASSESSMENT — ACTIVITIES OF DAILY LIVING (ADL)
ADLS_ACUITY_SCORE: 42

## 2025-01-27 NOTE — PROGRESS NOTES
"[facility]    Medicine Progress Note - Hospitalist Service, GOLD TEAM 17    Date of Admission:  1/4/2025    Assessment & Plan   jeannie is a 80 y/o man who has a past medical history significant for thyroid cancer with lung metastases, Parkinson's disease, neuropathy, glaucoma, osteoporosis, chronic pain, severe COPD, hyperlipidemia, obstructive sleep apnea and coronary artery disease. Patient presented on 04-Jan-2025 with a 7 day history of productive cough and a 1 day history of weakness. CT pulmonary angiogram was negative for pulmonary embolism. Patient appears to have COPD exacerbation but there is concern for community-acquired pneumonia as well. There is also concern that patient was in early sepsis on presentation.      Patient reportedly saw Pulmonology in Oct-2024 and COPD was moderately well-controlled at that time. Activity was apparently limited much more by his balance and parkinsonism than by respiratory status. Patient has antibiotics and prednisone available for outpatient COPD flares but apparently has not recently used this.      Patient apparently just entered hospice in the past week prior to hospital admission. Patient stated on admission that he was too weak and he and his daughter called their hospice agency and 911 was called and he was brought into the hospital. When provider asked on day of presentation whether patient wished for hospitalization and treatment of his potential infection, patient stated that he was seeking treatment and reported that his goal was to \"be able to do things for myself\". It was discussed that hospice typically focused on comfort rather than diagnostic testing and aggressive medical treatment. Patient acknowledged this but had seemed unsure of what his true desires for his care are moving forward.      Patient was on ceftriaxone from 04-Jan-2025 to 08-Jan-2025. Patient was azithromycin 500 mg from 05-Jan-2025 to " 07-Jan-2025. Patient was on zosyn from 09-Jan-2025 to 14-Jan-2025.      Patient's overall prognosis is poor. Patient has been seen by palliative care     1/27  JARAD has arranged for a FCC to discuss disposition   Patient gets very upset when discharge is mentioned and starts to hyperventilate. He knows LTC is being recommended  ? Fall yesterday   Spoke with daughter Bharath on 1/25 and updated on medical status . She was appreciative of call   Awaits placement in LTC with hospice   Silvia palliative input and SW help . Spoke with SW 1/22 and palliative care 1/23 and 1/24   No bed availability yet   Continue supportive cares         P:  1.) Pneumonia: completed abx  - Patient initially was treated for a possible community-acquired pneumonia on admission with ceftriaxone and azithromycin.  - Patient subsequently was treated for possible hospital-acquired pneumonia with zosyn.       2.) COPD exacerbation:  - Patient was on prednisone 40 mg daily from 04-Jan-2025 to 08-Jan-2025. Patient received prednisone 20 mg once on 09-Jan-2025 but was then on methylprednisolone 40 mg IV every 8 hours from 09-Jan-2025 yo 13-Jan-2025.   Patient is not on steroids any more  - Patient on azithromycin 500 mg every other day for now.     3.) Chronic hypoxemic respiratory failure:  - Patient usually on 1-2 litres per minute of supplemental oxygen at baseline.  - Patient patient's daughter, patient may be placed on BiPAP if needed.     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  - Further surveillance as outpatient.     5.) Right upper lobe lung mass s/p radiation therapy with possible radiation pneumonitis:  - Patient had previous VATS biopsy in Feb-2022 that was non-diagnostic.     6.) Post-surgical hypothyroidism:  - Patient on levothyroxine 112 mcg daily.     7.) Rheumatoid arthritis with positive rheumatoid factor:  - Per chart review, patient had once been on  methotrexate and prednisone but had stopped these medications a few years ago.  - Patient to f/u as outpatient if desired.     8.) Coronary artery disease with past MI s/p stent placement:  - Patient usually on aspirin and crestor but these are currently on hold.  - Patient continuing metoprolol succinate 50 mg daily.      9.) Hypertension:  - Monitoring on metoprolol succinate.  -lasix      10.) Hypernatremia, improved:       11.) Hyperlipidemia:  - cerstor on hold      12.) Parkinson's disease:  - Patient on sinemet three times a day.     13.) Neuropathy, chronic pain syndrome:  - Patient currently on gabapentin 400 mg three times a day.  -     14.) Glaucoma:  - Patient not currently on medication for this.  - Patient to f/u with Ophthalmology as outpatient if further treatment is desired.     15.) Osteoporosis:  - Patient on prolia as outpatient.     16.) Acute thrombocytopenia:  PLT 72 on 1/14      17.) History of subarachnoid hemorrhage in 2023:  - No active intervention indicated.     18.) Moderate malnutrition in the context of acute vs. chronic illness:  - Supplementing as able.              Diet: Snacks/Supplements Adult: Ensure Enlive; With Meals  Regular Diet Adult Thin Liquids (level 0)    DVT Prophylaxis: PCD  Mir Catheter: Not present  Lines: None     Cardiac Monitoring: None  Code Status: No CPR- Do NOT Intubate      Clinically Significant Risk Factors                   # Hypertension: Noted on problem list     # Acute Hypoxic Respiratory Failure: Documented O2 saturation < 90%. Continue supplemental oxygen as needed          # Moderate Malnutrition: based on nutrition assessment    # Financial/Environmental Concerns:           Social Drivers of Health    Tobacco Use: Medium Risk (10/18/2024)    Patient History     Smoking Tobacco Use: Former     Smokeless Tobacco Use: Never   Physical Activity: Inactive (10/14/2024)    Exercise Vital Sign     Days of Exercise per Week: 0 days     Minutes of  Exercise per Session: 0 min   Stress: Stress Concern Present (10/14/2024)    [employer] [place] [employer] - Occupational Stress Questionnaire     Feeling of Stress : Very much   Social Connections: Unknown (10/14/2024)    Social Connection and Isolation Panel [NHANES]     Frequency of Social Gatherings with Friends and Family: More than three times a week          Disposition Plan     Medically Ready for Discharge: Ready Now             Samantha Niño MD  Hospitalist Service, GOLD TEAM 17  [facility]  Securely message with Smart GPS Backpack (more info)  Text page via [facility] Paging/Directory   See signed in provider for up to date coverage information  ______________________________________________________________________    Interval History   No new symptoms reported per nursing staff .  Gets out of breath with minimal activity   No Fever   No vomiting   No difficulty with voiding   Passing gas .  Tolerating diet     4 system ROS reviewed .     Physical Exam   Vital Signs: Temp: 98.3  F (36.8  C) Temp src: Oral BP: 129/55 Pulse: 73   Resp: 16 SpO2: 100 % O2 Device: Nasal cannula Oxygen Delivery: 2 LPM  Weight: 160 lbs 14.4 oz         Alert and oriented .   Chest ; Breath sounds are reduced BL  Cardiovascular Exam ; S1 & S2 .  GI ; Abdomen is soft and non tender. BS positive .      Medical Decision Making       51 MINUTES SPENT BY ME on the date of service doing chart review, history, exam, documentation & further activities per the note.      Data

## 2025-01-27 NOTE — PLAN OF CARE
Goal Outcome Evaluation:      Plan of Care Reviewed With: patient    Overall Patient Progress: no change    VS: /55 (BP Location: Left arm)   Pulse 73   Temp 98.3  F (36.8  C) (Oral)   Resp 16   Wt 73 kg (160 lb 14.4 oz)   SpO2 100%   BMI 24.11 kg/m     O2: O2 sats maintained with 2L NC   Output: Voiding without difficulty in BR   Last BM: 1/26   Activity: Up with SBA and walker, per report patient was witnessed walking independently in room and refused assistance   Skin: BUE bruising and abrasions, jose j discoloration of BUE   Pain: Low back pain managed with rest and repositioning   CMS: A&Ox4, with forgetfulness/confusion baseline N&T to upper extremities   Dressing: Mepilexes to bilateral arms, CDI   Diet: Regular diet   LDA: R PIV SL   Equipment: Walker, IV pole, oxygen tubing   Plan: Dc pending medical assistance and LTC placement   Additional Info: Patient able to make needs known using call light appropriately, call light in reach, continue POC, report given to oncoming RN

## 2025-01-27 NOTE — PROGRESS NOTES
Care Management Follow Up    Length of Stay (days): 23    Expected Discharge Date: 01/30/25     Concerns to be Addressed: discharge planning     Patient plan of care discussed at interdisciplinary rounds: Yes    Anticipated Discharge Disposition: Home, Hospice        Anticipated Discharge Services: Other (see comment) (current on home hospice)  Anticipated Discharge DME: None    Patient/family educated on Medicare website which has current facility and service quality ratings:    Education Provided on the Discharge Plan: Yes  Patient/Family in Agreement with the Plan:      Referrals Placed by CM/SW: Homecare (currently on home hospice)  Private pay costs discussed: Not applicable    Discussed  Partnership in Safe Discharge Planning  document with patient/family: Yes:     Handoff Completed: No, handoff not indicated or clinically appropriate    Additional Information:  SW met with Pt, Pt's daughter and Pt's . SW obtained 3 months worth of bank documents requested  by financial counseling to aid  with MA application and sent them to Financial counselor. SW identified that once  funding is in place SW will only send referrals to LTC and Hospice agencies that Pt is in agreement with.     Next Steps:   SW/RNCC will continue to follow for safe discharge placement  and planning.     Antonio Houston, MaineGeneral Medical CenterSW  [facility]   Ph: [phone]

## 2025-01-27 NOTE — PROGRESS NOTES
VS: /61   Pulse 72   Temp 98.5  F (36.9  C)   Resp 16   Wt 72.5 kg (159 lb 14.4 oz)   SpO2 (!) 90%   BMI 23.96 kg/m      O2: 2 L 02 at baseline, cont pulse ox   Output: Voids in urinal or toilet   Last BM: 1/26   Activity: Assist X1 with walker   Skin: Fragile, very dry, thin skin, mepilexes on bilateral arms from skin tears, scabs, scattered bruises   Pain: Constant pain in lower back, gets scheduled morphine   CMS: Baseline neuropathy   Dressing: None, a few mepilexes from skin tears   Diet: reg   LDA: PIV in RFA   Equipment: Personal walker   Plan: Had a meeting today with daughter and SW, working on finding placement with hospice   Additional Info:

## 2025-01-27 NOTE — PLAN OF CARE
Goal Outcome Evaluation:      Plan of Care Reviewed With: patient    Overall Patient Progress: no changeOverall Patient Progress: no change    Outcome Evaluation: pt is A&O x4, has call light and is able to make needs known. up with Ax1 an walker, bed alarm on. pt reported an unwittnessed fall this afternoon (see progress note for details), which, after speaking with pt, charge RN, manager and all floor staff it was concluded that the fall was unlikely to have happened, pt may be intermittently confused. answers orientation questions appropriately, but appeared not to know what time of day it was despite clock in view, and pt missed dinner becuase he did not call room service before 6:30pm. box lunch given to patient for dinner. still reporting SOB at baseline with exertion, on 2L O2 via NC. discharge pending placement. continue POC      VS: Temp: 98.6  F (37  C) Temp src: Oral BP: 123/68 Pulse: 77   Resp: 16 SpO2: 97 % O2 Device: Nasal cannula Oxygen Delivery: 2 LPM       O2: SpO2 95-97 on 2L via NC. Reports SOB and baseline with exertion, denies CP. Lung sounds coarse, diminished with expiratory wheezing heard on auscultation   Output: Voiding without difficulty in bathroom   Last BM: 1/26/2025   Activity: Up with Ax1 and walker, declines to use gait belt. Bed alarm on   Skin: Scattered bruising and abrasions to BUE, jose j discoloration   Pain: Lower back pain managed with scheduled morphine, given x2 this sal   CMS: A&O x4, baseline neuropathy to BLE   Dressing: Bilateral arm mepilexes CDI   Diet: Regular diet, denies nausea and vomiting   LDA: R PIV SL and patent   Equipment: Call light, O2 and tubing, walker, personal belongings   Plan: Awaiting MA and LTC placement   Additional Info:

## 2025-01-27 NOTE — PROGRESS NOTES
[facility]  [address] [id number] [phone]      1/27/2025    Re: Harrison Thomas      To ; Judge Griggs    Harrison Thomas has been admitted to our hospital from 1/4/2025 to 1/27/2025.  He has severe chronic obstructive lung disease and dependent on oxygen . He also has metastatic thyroid cancer and Parkinson's disease and Coronary artery disease . He gets out of breath with minimal exertion . He is not able to travel to court currently and is seeking hospice care once a facility is procured for his stay .    Please consider video visit for court proceedings due to Mery severe chronic obstructive lung disease and respiratory failure         Sincerely,       Samantha Niño MD   [phone]

## 2025-01-27 NOTE — PROGRESS NOTES
CLINICAL NUTRITION SERVICES - REASSESSMENT NOTE     RECOMMENDATIONS FOR MDs/PROVIDERS TO ORDER:  ***    Malnutrition Status:    ***    Registered Dietitian Interventions:  ***    Future/Additional Recommendations:  ***     SUBJECTIVE INFORMATION  {RDNSubjectiveinformation:442626} Pt reports ***    CURRENT NUTRITION ORDERS  Diet: Regular   Supplements: Ensure Enlive with all meals    CURRENT INTAKE/TOLERANCE  100% of documented meals per flowsheets. Poorly documented over last week     Pt ordering (on average) 3500 kcal and 95 g protein per day per [employer]. With documented and reported*** intakes, pt is likely meeting ***% minimum energy and ***% protein needs.     NEW FINDINGS  General: pt with plan to discharge with hospice, with plans to continue supportive cares until discharge.     Weight: Pt weight stable during admission and over last 3 months  01/26/25 0655 73 kg (160 lb 14.4 oz) Standing scale   01/25/25 0735 72.3 kg (159 lb 6.4 oz) Standing scale   01/20/25 0500 73.3 kg (161 lb 9.6 oz) --   01/17/25 0615 73.5 kg (162 lb 1.6 oz) Standing scale   01/16/25 0623 72.8 kg (160 lb 6.4 oz) Standing scale   01/14/25 0839 72.8 kg (160 lb 9.6 oz) Standing scale   01/13/25 1100 71.5 kg (157 lb 10.1 oz) Standing scale   01/13/25 0653 72.1 kg (159 lb) Standing scale   01/12/25 0739 72.7 kg (160 lb 4.8 oz) Standing scale   01/11/25 0746 73.1 kg (161 lb 1.6 oz) Standing scale   01/06/25 0556 71.6 kg (157 lb 14.4 oz) Standing scale     10/24/24 71.5 kg (157 lb 9.6 oz)   10/18/24 70.8 kg (156 lb)   09/23/24 70.9 kg (156 lb 6 oz)   09/20/24 70.8 kg (156 lb)   07/18/24 73 kg (161 lb)   06/19/24 73 kg (161 lb)   05/30/24 70.8 kg (156 lb)   05/14/24 77.1 kg (170 lb)   05/10/24 77.1 kg (170 lb)   05/08/24 77.5 kg (170 lb 12.8 oz)   05/01/24 78.6 kg (173 lb 3.2 oz)   04/29/24 78.7 kg (173 lb 6.4 oz)   04/26/24 75.9 kg (167 lb 6.4 oz)   04/22/24 78.5 kg (173 lb)   04/07/24 78.5 kg (173 lb)   03/19/24 81.2 kg (179 lb)   03/07/24  82.1 kg (181 lb)   01/26/24 80.7 kg (178 lb)   01/17/24 80.3 kg (177 lb)     Skin/wounds:   Lee 18 (Nutrition score: 3)  GI symptoms: ***  Nutrition-relevant labs: Reviewed  Nutrition-relevant medications: {RDNNewfindingsrelevantlabsmeds:363766}Azithromycin, lasix, morphine, potassium chloride,   IV fluids over last 7 days: ***     MALNUTRITION  % Intake: {RDNMalnutrition%intake:147960}  % Weight Loss: None noted  Subcutaneous Fat Loss: {RDNMalnutritionsubcutaneousfatloss:483047}  Muscle Loss: {RDNMalnutritionmuscleloss:463461}  Fluid Accumulation/Edema: None noted  Malnutrition Diagnosis: {RDNMalnutritiondiagnosis:679717}  Malnutrition Present on Admission: {RDNMalnutritionpresentonadmit:163188}    EVALUATION OF THE PROGRESS TOWARD GOALS    Previous Goals  Patient to consume % of nutritionally adequate meal trays TID, or the equivalent with supplements/snacks.  Evaluation: Progressing    Previous Nutrition Diagnosis  Predicted inadequate protein-energy intake related to variable appetite as evidenced by pt reliant on PO intakes to meet 100% of nutritional needs with potential for variation  Evaluation: {RDNpreviousnutritiondiagnosisevaluation:273347}    NUTRITION DIAGNOSIS  {RDNNutritiondiagnosis:138647} related to *** as evidenced by ***    INTERVENTIONS  {RDNInterventions:465014}    Goals  Patient to consume % of nutritionally adequate meal trays TID, or the equivalent with supplements/snacks.     Monitoring/Evaluation      Progress toward goals will be monitored and evaluated per policy.    Kristine Zarate MS, RDN, LD  TCU/OB/Ortho Clinical Dietitian  Available via phone and Vocera  Phone: [phone]  Vocera: Ortho Clinical Dietitian   Weekend/Holiday Vocera: Weekend Holiday Clinical Dietitian [Multi Site Groups]   per policy.    Kristine Zarate MS, RDN, LD  TCU/OB/Ortho Clinical Dietitian  Available via phone and Vocera  Phone: [phone]  Vocera: Ortho Clinical Dietitian   Weekend/Holiday Vocera: Weekend Holiday Clinical Dietitian [Multi Site Groups]

## 2025-01-28 ENCOUNTER — APPOINTMENT (OUTPATIENT)
Dept: GENERAL RADIOLOGY | Facility: CLINIC | Age: 80
End: 2025-01-28
Attending: STUDENT IN AN ORGANIZED HEALTH CARE EDUCATION/TRAINING PROGRAM
Payer: MEDICARE

## 2025-01-28 LAB
ANION GAP SERPL CALCULATED.3IONS-SCNC: 9 MMOL/L (ref 7–15)
BASOPHILS # BLD AUTO: 0 10E3/UL (ref 0–0.2)
BASOPHILS NFR BLD AUTO: 0 %
BUN SERPL-MCNC: 6.5 MG/DL (ref 8–23)
CALCIUM SERPL-MCNC: 8.5 MG/DL (ref 8.8–10.4)
CHLORIDE SERPL-SCNC: 105 MMOL/L (ref 98–107)
CREAT SERPL-MCNC: 0.72 MG/DL (ref 0.67–1.17)
EGFRCR SERPLBLD CKD-EPI 2021: >90 ML/MIN/1.73M2
EOSINOPHIL # BLD AUTO: 0.2 10E3/UL (ref 0–0.7)
EOSINOPHIL NFR BLD AUTO: 3 %
ERYTHROCYTE [DISTWIDTH] IN BLOOD BY AUTOMATED COUNT: 15.9 % (ref 10–15)
GLUCOSE SERPL-MCNC: 85 MG/DL (ref 70–99)
HCO3 SERPL-SCNC: 30 MMOL/L (ref 22–29)
HCT VFR BLD AUTO: 33.6 % (ref 40–53)
HGB BLD-MCNC: 11.5 G/DL (ref 13.3–17.7)
IMM GRANULOCYTES # BLD: 0.2 10E3/UL
IMM GRANULOCYTES NFR BLD: 3 %
LYMPHOCYTES # BLD AUTO: 1.1 10E3/UL (ref 0.8–5.3)
LYMPHOCYTES NFR BLD AUTO: 20 %
MCH RBC QN AUTO: 32.1 PG (ref 26.5–33)
MCHC RBC AUTO-ENTMCNC: 34.2 G/DL (ref 31.5–36.5)
MCV RBC AUTO: 94 FL (ref 78–100)
MONOCYTES # BLD AUTO: 0.5 10E3/UL (ref 0–1.3)
MONOCYTES NFR BLD AUTO: 9 %
NEUTROPHILS # BLD AUTO: 3.7 10E3/UL (ref 1.6–8.3)
NEUTROPHILS NFR BLD AUTO: 66 %
NRBC # BLD AUTO: 0 10E3/UL
NRBC BLD AUTO-RTO: 0 /100
PLATELET # BLD AUTO: 54 10E3/UL (ref 150–450)
POTASSIUM SERPL-SCNC: 4.2 MMOL/L (ref 3.4–5.3)
RBC # BLD AUTO: 3.58 10E6/UL (ref 4.4–5.9)
SODIUM SERPL-SCNC: 144 MMOL/L (ref 135–145)
WBC # BLD AUTO: 5.6 10E3/UL (ref 4–11)

## 2025-01-28 PROCEDURE — 99232 SBSQ HOSP IP/OBS MODERATE 35: CPT | Performed by: INTERNAL MEDICINE

## 2025-01-28 PROCEDURE — 250N000013 HC RX MED GY IP 250 OP 250 PS 637: Performed by: INTERNAL MEDICINE

## 2025-01-28 PROCEDURE — 71046 X-RAY EXAM CHEST 2 VIEWS: CPT | Mod: 26 | Performed by: RADIOLOGY

## 2025-01-28 PROCEDURE — 250N000012 HC RX MED GY IP 250 OP 636 PS 637: Performed by: INTERNAL MEDICINE

## 2025-01-28 PROCEDURE — 120N000002 HC R&B MED SURG/OB UMMC

## 2025-01-28 PROCEDURE — 999N000157 HC STATISTIC RCP TIME EA 10 MIN

## 2025-01-28 PROCEDURE — 36415 COLL VENOUS BLD VENIPUNCTURE: CPT | Performed by: INTERNAL MEDICINE

## 2025-01-28 PROCEDURE — 94640 AIRWAY INHALATION TREATMENT: CPT | Mod: 76

## 2025-01-28 PROCEDURE — 82435 ASSAY OF BLOOD CHLORIDE: CPT | Performed by: INTERNAL MEDICINE

## 2025-01-28 PROCEDURE — 250N000013 HC RX MED GY IP 250 OP 250 PS 637: Performed by: PHYSICIAN ASSISTANT

## 2025-01-28 PROCEDURE — 250N000013 HC RX MED GY IP 250 OP 250 PS 637: Performed by: STUDENT IN AN ORGANIZED HEALTH CARE EDUCATION/TRAINING PROGRAM

## 2025-01-28 PROCEDURE — 71046 X-RAY EXAM CHEST 2 VIEWS: CPT

## 2025-01-28 PROCEDURE — 80048 BASIC METABOLIC PNL TOTAL CA: CPT | Performed by: INTERNAL MEDICINE

## 2025-01-28 PROCEDURE — 250N000009 HC RX 250

## 2025-01-28 PROCEDURE — 250N000009 HC RX 250: Performed by: INTERNAL MEDICINE

## 2025-01-28 PROCEDURE — 85025 COMPLETE CBC W/AUTO DIFF WBC: CPT | Performed by: INTERNAL MEDICINE

## 2025-01-28 PROCEDURE — 250N000013 HC RX MED GY IP 250 OP 250 PS 637

## 2025-01-28 RX ORDER — MULTIPLE VITAMINS W/ MINERALS TAB 9MG-400MCG
1 TAB ORAL DAILY
Status: DISCONTINUED | OUTPATIENT
Start: 2025-01-29 | End: 2025-02-18 | Stop reason: HOSPADM

## 2025-01-28 RX ORDER — VITAMIN B COMPLEX
25 TABLET ORAL DAILY
Status: DISCONTINUED | OUTPATIENT
Start: 2025-01-29 | End: 2025-02-18 | Stop reason: HOSPADM

## 2025-01-28 RX ORDER — PREDNISONE 20 MG/1
40 TABLET ORAL DAILY
Status: COMPLETED | OUTPATIENT
Start: 2025-01-28 | End: 2025-02-01

## 2025-01-28 RX ORDER — PREDNISONE 1 MG/1
2 TABLET ORAL DAILY
Status: DISCONTINUED | OUTPATIENT
Start: 2025-02-02 | End: 2025-02-06

## 2025-01-28 RX ADMIN — CARBIDOPA AND LEVODOPA 1 TABLET: 25; 100 TABLET ORAL at 19:30

## 2025-01-28 RX ADMIN — IPRATROPIUM BROMIDE AND ALBUTEROL SULFATE 3 ML: .5; 3 SOLUTION RESPIRATORY (INHALATION) at 08:05

## 2025-01-28 RX ADMIN — AZITHROMYCIN DIHYDRATE 500 MG: 250 TABLET ORAL at 08:29

## 2025-01-28 RX ADMIN — Medication 1 HALF-TAB: at 08:28

## 2025-01-28 RX ADMIN — GABAPENTIN 400 MG: 300 CAPSULE ORAL at 08:29

## 2025-01-28 RX ADMIN — GABAPENTIN 400 MG: 300 CAPSULE ORAL at 14:34

## 2025-01-28 RX ADMIN — MORPHINE SULFATE 5 MG: 20 SOLUTION ORAL at 08:29

## 2025-01-28 RX ADMIN — IPRATROPIUM BROMIDE AND ALBUTEROL SULFATE 3 ML: .5; 3 SOLUTION RESPIRATORY (INHALATION) at 12:25

## 2025-01-28 RX ADMIN — LEVOTHYROXINE SODIUM 112 MCG: 112 TABLET ORAL at 08:29

## 2025-01-28 RX ADMIN — CARBIDOPA AND LEVODOPA 1 TABLET: 25; 100 TABLET ORAL at 08:28

## 2025-01-28 RX ADMIN — POTASSIUM CHLORIDE 20 MEQ: 1500 TABLET, EXTENDED RELEASE ORAL at 08:29

## 2025-01-28 RX ADMIN — PREDNISONE 40 MG: 20 TABLET ORAL at 21:06

## 2025-01-28 RX ADMIN — IPRATROPIUM BROMIDE AND ALBUTEROL SULFATE 3 ML: .5; 3 SOLUTION RESPIRATORY (INHALATION) at 14:13

## 2025-01-28 RX ADMIN — GABAPENTIN 400 MG: 300 CAPSULE ORAL at 19:30

## 2025-01-28 RX ADMIN — Medication 1 HALF-TAB: at 19:30

## 2025-01-28 RX ADMIN — Medication 1 HALF-TAB: at 14:34

## 2025-01-28 RX ADMIN — CARBIDOPA AND LEVODOPA 1 TABLET: 25; 100 TABLET ORAL at 14:34

## 2025-01-28 RX ADMIN — AMLODIPINE BESYLATE 5 MG: 5 TABLET ORAL at 21:06

## 2025-01-28 RX ADMIN — METOPROLOL SUCCINATE 50 MG: 50 TABLET, EXTENDED RELEASE ORAL at 08:28

## 2025-01-28 RX ADMIN — ROSUVASTATIN 10 MG: 10 TABLET, FILM COATED ORAL at 08:29

## 2025-01-28 RX ADMIN — IPRATROPIUM BROMIDE AND ALBUTEROL SULFATE 3 ML: .5; 3 SOLUTION RESPIRATORY (INHALATION) at 16:08

## 2025-01-28 RX ADMIN — MORPHINE SULFATE 5 MG: 20 SOLUTION ORAL at 21:07

## 2025-01-28 RX ADMIN — FUROSEMIDE 20 MG: 20 TABLET ORAL at 08:30

## 2025-01-28 RX ADMIN — MORPHINE SULFATE 5 MG: 20 SOLUTION ORAL at 14:34

## 2025-01-28 RX ADMIN — MORPHINE SULFATE 10 MG: 20 SOLUTION ORAL at 00:22

## 2025-01-28 RX ADMIN — IPRATROPIUM BROMIDE AND ALBUTEROL SULFATE 3 ML: .5; 3 SOLUTION RESPIRATORY (INHALATION) at 19:55

## 2025-01-28 RX ADMIN — MORPHINE SULFATE 5 MG: 20 SOLUTION ORAL at 17:55

## 2025-01-28 RX ADMIN — MORPHINE SULFATE 5 MG: 20 SOLUTION ORAL at 11:31

## 2025-01-28 ASSESSMENT — ACTIVITIES OF DAILY LIVING (ADL)
ADLS_ACUITY_SCORE: 42

## 2025-01-28 NOTE — PLAN OF CARE
Goal Outcome Evaluation:      Plan of Care Reviewed With: patient    Overall Patient Progress: no changeOverall Patient Progress: no change    VS: Temp: 98.1  F (36.7  C) Temp src: Oral BP: 115/56 Pulse: 81   Resp: 20 SpO2: 94 % O2 Device: Nasal cannula Oxygen Delivery: 3 LPM     O2: Sats >90% on 3L NC. Lung sounds coarse/crackles, expiratory wheezes. MD aware. Xray order in place. Endorses SOB with activity. Refused pulse oximeter overnight.    Output: Continent of B/B. No discomfort with voiding. Passing flatus   Last BM: 1/26/25   Activity: SBA with walker   Up for meals? N/A   Skin: BLE jose j & bruising  BUE bruising, scabs, & skin tear   Pain: C/o lower back pain. Managed with Morphine.    CMS: A&Ox4. BLE N/T baseline.    Dressing: BUE with Foam   Diet: Regular/Thin/Whole   LDA: Rt arm PIV   Equipment: Personal belongings    Plan: Xray. Wean O2.    Additional Info: Call light within reach   Shift [id number]

## 2025-01-28 NOTE — PROVIDER NOTIFICATION
Pt just completed scheduled nebulizer, per RT he is now requiring 4L on the nasal cannula to keep O2 sats at 90-92% lung sounds very coarse. Paged provider to notify

## 2025-01-28 NOTE — PLAN OF CARE
Goal Outcome Evaluation:      Plan of Care Reviewed With: patient    Overall Patient Progress: no changeOverall Patient Progress: no change    Outcome Evaluation: pt is A&O x4, has call light in reach and is able to make needs known. pt has frequent, productive cough which has worsened today per report from patient. after given scheduled nebulizer treatment, pt had to be placed on 4L O2 via NC to maintain saturations at 90-92 percent. Lung sounds are coarse, diminished with expiratory wheezing heard on auscultation. provider was notified and ordered chest x-ray. pt is pending placement with hospice care and is DNR/DNI. continue POC      VS: Temp: 98.5  F (36.9  C)   BP: 118/61 Pulse: 72   Resp: 16 SpO2: (!) 90 % O2 Device: Nasal cannula Oxygen Delivery: 4 LPM     O2: Pt uses 1-2LPM at baseline at home. Currently on 4L via NC to keep sats at 90-92. Scheduled and prn nebulizers available, per RT. Reports SOB with exertion at baseline. Lung sounds coarse, diminished with expiratory wheezing   Output: Voiding without difficulty in bathroom   Last BM: 1/26   Activity: Up with Ax1 and walker   Skin: Ziyad discoloration to BUE  Abrasions on BUE with mepilex in place  Scabs to BUE   Pain: Lower back pain Managed with scheduled morphine   CMS: A&O x4, baseline neuropathy to BLE   Dressing: Bilateral arm mepilexes CDI   Diet: Regular, denies nausea    LDA: R PIV SL and patent   Equipment: IV pole, walker, personal belongings, O2   Plan: Discharge pending placement with hospice care   Additional Info:

## 2025-01-28 NOTE — PROGRESS NOTES
"[facility]    Medicine Progress Note - Hospitalist Service, GOLD TEAM 17    Date of Admission:  1/4/2025    Assessment & Plan     Patient is a 78 y/o man who has a past medical history significant for thyroid cancer with lung metastases, Parkinson's disease, neuropathy, glaucoma, osteoporosis, chronic pain, severe COPD, hyperlipidemia, obstructive sleep apnea and coronary artery disease. Patient presented on 1/04/2025 with a 7 day history of productive cough and a 1 day history of weakness. CT pulmonary angiogram was negative for pulmonary embolism. Patient as admitted with  COPD exacerbation  and possible  community-acquired pneumonia as well.      Patient reportedly saw Pulmonology in Oct-2024 and COPD was moderately well-controlled at that time. Activity was apparently limited much more by his balance and parkinsonism than by respiratory status. Patient has antibiotics and prednisone available for outpatient COPD flares but apparently has not recently used this.      Patient apparently just entered hospice in the past week prior to hospital admission. Patient stated on admission that he was too weak and he and his daughter called their hospice agency and 911 was called and he was brought into the hospital. When provider asked on day of presentation whether patient wished for hospitalization and treatment of his potential infection, patient stated that he was seeking treatment and reported that his goal was to \"be able to do things for myself\". It was discussed that hospice typically focused on comfort rather than diagnostic testing and aggressive medical treatment. Patient acknowledged this but had seemed unsure of what his true desires for his care are moving forward.      Patient was on ceftriaxone from 1/04 - 1/08.  azithromycin 500 mg from 1/05-1/07-,  zosyn 1/09-1/ 14- now on every other day       Patient's overall prognosis is poor. Patient has been seen by " "palliative care      1/28  - bit more shortness of breath , start prednsone 40 mg x 5 days then 2 mg daily as had been on it before   Awaits placement in LTC with hospice   Silvia palliative input and SW help . Spoke with SW 1/22 and palliative care 1/23 and 1/24   Continue supportive cares  -worsening thrombocytopenia, check smear         Pneumonia  Sepsis   - completed antibiotics  - on admission felt to have sepsis   - Patient initially was treated for a possible community-acquired pneumonia on admission with ceftriaxone and azithromycin.  - Patient subsequently was treated for possible hospital-acquired pneumonia with zosyn.     COPD exacerbation  Emphysema   Chronic hypoxemic respiratory failure  - shortness of breath  with minimal exertion  - sees pulmonary as out patient , has not been interested in pulmonary rehab   - recent prolonged hospitalization with COPD exacerbation    -PFTs of 3/24/2023 showing FEV1/FVC of 1.3/2.4 (46/65% predicted, respectively) for ratio 54%. TLC is reduced at 5.1 (74% predicted) and DLCO uncorrected is very low at 10.1 (41% predicted).   - was on prednisone 40 mg daily from 1/04/2025 to 1/08/2025. 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  - CT pulmonary angio 1/4 had no pulmonary embolism  . \"Waxing and waning pulmonary nodularity with a new 8 mm nodular  focus in the left lower lobe. Findings are likely secondary to a chronic infectious/inflammatory process including aspiration. Consider  short-term follow-up CT in one month for reassessment. 3. Additional sub-6 mm pulmonary nodules are stable compared to prior.  4. Moderate to severe emphysematous changes of the lung parenchyma with chronic " "fibrotic changes along the right minor fissure.\"  - Patient patient's daughter, patient may be placed on BiPAP if needed.      Coronary artery disease with past MI s/p stent Cx  1997  placement  HDL   - at time of initiall stent placement he was noted to have 80-90% lesion in nondominant RCA  and 50 % LAD that was treated medically   - Patient usually on aspirin and crestor but these are currently on hold.  - Patient continuing metoprolol succinate 50 mg daily.       Hypertension  - continue PTA metoprolol succinate.  - continue lasix     History of traumatic  subdural hemorrhage in 2023  - since resolved per NS follow up      Parkinson's disease  - recently diagnosed   -Has seen neurology and on sinemet and continue     Frequent falls  - with left femur fracture and inf pubic rami fracture, also history rib fracture        Metastatic thyroid cancer (multifocal papillary carcinoma)   Status post  thyroidectomy 2021   also received radioactive iodine   - had lymph node metastases,.  - Further surveillance as outpatient.  - continue levothyroxine         Right upper lobe lung mass,  s/p radiation therapy with possible radiation pneumonitis:  -  had previous VATS biopsy in Feb-2022 that was non-diagnostic.     New lung nodule in left lower lobe  - follow up  pulmonary l        Thrombocytopenia  -  last plt count was 72K 1/14, repeat now at 54K, was low on admission  - he denies alcohol consumption  - cont to monitor   - ASA on hold    - check peripheral smear        Rheumatoid arthritis with positive rheumatoid factor:  - Per chart review, patient had once been on methotrexate and prednisone but had stopped these medications a few years ago.  - Patient to f/u as outpatient if desired.     Osteopenia   -had been on calcium and vit D       History of immunodeficency  - IG subclass 2        Hypernatremia  - improved:           Neuropathy   chronic pain syndrome:  - Patient currently on gabapentin 400 mg three times a " day.  --his  PTA robaxin 500mg TID has been on hold   -Hold PTA trazadone 50mg tab QHS  -Continue PTA oxycodone 5mg Q6H for pain       Glaucoma:  Dry eyes    - Patient not currently on medication for this.  - Patient to f/u with Ophthalmology as outpatient if further treatment is desired.       osteoporosis  h/o right femoral neck fracture s/p right total hip replacement   -hold PTA denosumab 60mg injection every 6 months (last 10/25/2024)  -restarted  PTA calcium , vitamin D  - prolia q 6 mo          Moderate malnutrition in the context of acute vs. chronic illness:  - Supplementing as able.       He was seen by palliative cares , possible discharge  to Hospice, when I talked to py 1/28 he stated that t he wanted to get better         Diet: Snacks/Supplements Adult: Ensure Enlive; With Meals  Regular Diet Adult Thin Liquids (level 0)    DVT Prophylaxis: consider subcutaneous heparin   Mir Catheter: Not present  Lines: None     Cardiac Monitoring: None  Code Status: No CPR- Do NOT Intubate      Clinically Significant Risk Factors                   # Hypertension: Noted on problem list     # Acute Hypoxic Respiratory Failure: Documented O2 saturation < 90%. Continue supplemental oxygen as needed          # Moderate Malnutrition: based on nutrition assessment    # Financial/Environmental Concerns:           Social Drivers of Health    Tobacco Use: Medium Risk (10/18/2024)    Patient History     Smoking Tobacco Use: Former     Smokeless Tobacco Use: Never   Physical Activity: Inactive (10/14/2024)    Exercise Vital Sign     Days of Exercise per Week: 0 days     Minutes of Exercise per Session: 0 min   Stress: Stress Concern Present (10/14/2024)    Citizen of [place] of Occupational Health - Occupational Stress Questionnaire     Feeling of Stress : Very much   Social Connections: Unknown (10/14/2024)    Social Connection and Isolation Panel [NHANES]     Frequency of Social Gatherings with Friends and Family: More than  three times a week          Disposition Plan     Medically Ready for Discharge: Anticipated in 5+ Days             Opal Bowman MD  Hospitalist Service, GOLD TEAM 17  [facility]  Securely message with [employer] (more info)  Text page via [facility] Paging/Directory   See signed in provider for up to date coverage information  ______________________________________________________________________    Interval History   Perhaps bit more short of breath today, gets very short of breath with minimal exertion     Physical Exam   Vital Signs: Temp: 98.4  F (36.9  C) Temp src: Oral BP: 125/65 Pulse: 79   Resp: 18 SpO2: 97 % O2 Device: Nasal cannula Oxygen Delivery: 3 LPM  Weight: 159 lbs 14.4 oz  General appearence: awake alert  , some shortness of breath  but in  no apparent distress    RESPIRATORY: lungs  tight with expiratory wheezing    CARDIOVASCULAR:S1 S2 regular rate and rhythm,  GASTROINTESTINAL:soft, non-distended , non-tender , + bowel sounds,    SKIN: warm and dry, no mottling noted   NEUROLOGIC; awake alert and oriented,   EXTREMITIES: no clubbing, cyanosis or edema       Data         Imaging results reviewed over the past 24 hrs:   No results found for this or any previous visit (from the past 24 hours).

## 2025-01-28 NOTE — PROGRESS NOTES
VS: /65   Pulse 79   Temp 98.4  F (36.9  C)   Resp 18   Wt 72 kg (158 lb 11.2 oz)   SpO2 98%   BMI 23.78 kg/m      O2: 2L O2 baseline   Output: Voids in toilet   Last BM: 1/26   Activity: Assist X1 with personal walker   Skin: Fragile, thin, flaky, scabs, mepilex in place for skin tears   Pain: Complains of constant pain in lower back   CMS: Intact denies numbness/tingling   Dressing: none   Diet: reg   LDA: PIV in RFA   Equipment: Personal walker, O2   Plan: Waiting for placement at facility with hospice   Additional Info:

## 2025-01-28 NOTE — PROGRESS NOTES
Care Management Follow Up    Length of Stay (days): 24    Expected Discharge Date: 01/30/2025     Concerns to be Addressed: discharge planning     Patient plan of care discussed at interdisciplinary rounds: Yes    Anticipated Discharge Disposition: Home, Hospice              Anticipated Discharge Services: Other (see comment) (current on home hospice)  Anticipated Discharge DME: None    Patient/family educated on Medicare website which has current facility and service quality ratings:    Education Provided on the Discharge Plan: Yes  Patient/Family in Agreement with the Plan:      Referrals Placed by CM/SW: Homecare (currently on home hospice)  Private pay costs discussed: Not applicable    Discussed  Partnership in Safe Discharge Planning  document with patient/family: Yes:     Handoff Completed: No, handoff not indicated or clinically appropriate    Additional Information:  SW met with Pt at bedside. SW identified that Financial Counseling confirmed MA was pending. SW provided list of LTC facilities that accept Pt on MA Pending. SW requested several choices by the end of the day tomorrow or early Thursday morning. Pt identified that he will want to review the information and consult with his daughter and will have choices by Thursday for JARAD.    Next Steps:   JARAD will continue to follow Pt and obtain LTC and hospice choices and send referrals.    Antonio Houston, LICSW  [facility] [place] [facility]   Ph: [phone]

## 2025-01-29 LAB
BASOPHILS # BLD AUTO: 0 10E3/UL (ref 0–0.2)
BASOPHILS NFR BLD AUTO: 0 %
EOSINOPHIL # BLD AUTO: 0 10E3/UL (ref 0–0.7)
EOSINOPHIL NFR BLD AUTO: 0 %
ERYTHROCYTE [DISTWIDTH] IN BLOOD BY AUTOMATED COUNT: 15.9 % (ref 10–15)
HCT VFR BLD AUTO: 32.7 % (ref 40–53)
HGB BLD-MCNC: 11.1 G/DL (ref 13.3–17.7)
IMM GRANULOCYTES # BLD: 0.2 10E3/UL
IMM GRANULOCYTES NFR BLD: 4 %
LYMPHOCYTES # BLD AUTO: 0.4 10E3/UL (ref 0.8–5.3)
LYMPHOCYTES NFR BLD AUTO: 10 %
MCH RBC QN AUTO: 30.7 PG (ref 26.5–33)
MCHC RBC AUTO-ENTMCNC: 33.9 G/DL (ref 31.5–36.5)
MCV RBC AUTO: 91 FL (ref 78–100)
MONOCYTES # BLD AUTO: 0.1 10E3/UL (ref 0–1.3)
MONOCYTES NFR BLD AUTO: 2 %
NEUTROPHILS # BLD AUTO: 3.7 10E3/UL (ref 1.6–8.3)
NEUTROPHILS NFR BLD AUTO: 83 %
NRBC # BLD AUTO: 0 10E3/UL
NRBC BLD AUTO-RTO: 1 /100
PATH REPORT.COMMENTS IMP SPEC: NORMAL
PATH REPORT.COMMENTS IMP SPEC: NORMAL
PATH REPORT.FINAL DX SPEC: NORMAL
PATH REPORT.MICROSCOPIC SPEC OTHER STN: NORMAL
PATH REPORT.MICROSCOPIC SPEC OTHER STN: NORMAL
PATH REPORT.RELEVANT HX SPEC: NORMAL
PLATELET # BLD AUTO: 59 10E3/UL (ref 150–450)
RBC # BLD AUTO: 3.61 10E6/UL (ref 4.4–5.9)
RETICS # AUTO: 0.16 10E6/UL (ref 0.03–0.1)
RETICS/RBC NFR AUTO: 4.4 % (ref 0.5–2)
WBC # BLD AUTO: 4.5 10E3/UL (ref 4–11)

## 2025-01-29 PROCEDURE — 250N000013 HC RX MED GY IP 250 OP 250 PS 637: Performed by: INTERNAL MEDICINE

## 2025-01-29 PROCEDURE — 85004 AUTOMATED DIFF WBC COUNT: CPT | Performed by: INTERNAL MEDICINE

## 2025-01-29 PROCEDURE — 250N000013 HC RX MED GY IP 250 OP 250 PS 637

## 2025-01-29 PROCEDURE — 85018 HEMOGLOBIN: CPT | Performed by: INTERNAL MEDICINE

## 2025-01-29 PROCEDURE — 36415 COLL VENOUS BLD VENIPUNCTURE: CPT | Performed by: INTERNAL MEDICINE

## 2025-01-29 PROCEDURE — 99232 SBSQ HOSP IP/OBS MODERATE 35: CPT | Performed by: INTERNAL MEDICINE

## 2025-01-29 PROCEDURE — 250N000009 HC RX 250

## 2025-01-29 PROCEDURE — 85045 AUTOMATED RETICULOCYTE COUNT: CPT | Performed by: INTERNAL MEDICINE

## 2025-01-29 PROCEDURE — 94640 AIRWAY INHALATION TREATMENT: CPT | Mod: 76

## 2025-01-29 PROCEDURE — 85041 AUTOMATED RBC COUNT: CPT | Performed by: INTERNAL MEDICINE

## 2025-01-29 PROCEDURE — 85060 BLOOD SMEAR INTERPRETATION: CPT | Performed by: PATHOLOGY

## 2025-01-29 PROCEDURE — 999N000157 HC STATISTIC RCP TIME EA 10 MIN

## 2025-01-29 PROCEDURE — 120N000002 HC R&B MED SURG/OB UMMC

## 2025-01-29 PROCEDURE — 250N000013 HC RX MED GY IP 250 OP 250 PS 637: Performed by: STUDENT IN AN ORGANIZED HEALTH CARE EDUCATION/TRAINING PROGRAM

## 2025-01-29 PROCEDURE — 250N000012 HC RX MED GY IP 250 OP 636 PS 637: Performed by: INTERNAL MEDICINE

## 2025-01-29 RX ADMIN — CARBIDOPA AND LEVODOPA 1 TABLET: 25; 100 TABLET ORAL at 20:05

## 2025-01-29 RX ADMIN — IPRATROPIUM BROMIDE AND ALBUTEROL SULFATE 3 ML: .5; 3 SOLUTION RESPIRATORY (INHALATION) at 07:56

## 2025-01-29 RX ADMIN — ROSUVASTATIN 10 MG: 10 TABLET, FILM COATED ORAL at 08:21

## 2025-01-29 RX ADMIN — Medication 1 TABLET: at 08:20

## 2025-01-29 RX ADMIN — GABAPENTIN 400 MG: 300 CAPSULE ORAL at 08:20

## 2025-01-29 RX ADMIN — PREDNISONE 40 MG: 20 TABLET ORAL at 08:21

## 2025-01-29 RX ADMIN — Medication 1 HALF-TAB: at 08:20

## 2025-01-29 RX ADMIN — GABAPENTIN 400 MG: 300 CAPSULE ORAL at 15:08

## 2025-01-29 RX ADMIN — GABAPENTIN 400 MG: 300 CAPSULE ORAL at 20:05

## 2025-01-29 RX ADMIN — IPRATROPIUM BROMIDE AND ALBUTEROL SULFATE 3 ML: .5; 3 SOLUTION RESPIRATORY (INHALATION) at 20:36

## 2025-01-29 RX ADMIN — FUROSEMIDE 20 MG: 20 TABLET ORAL at 08:21

## 2025-01-29 RX ADMIN — Medication 1 HALF-TAB: at 20:05

## 2025-01-29 RX ADMIN — IPRATROPIUM BROMIDE AND ALBUTEROL SULFATE 3 ML: .5; 3 SOLUTION RESPIRATORY (INHALATION) at 12:27

## 2025-01-29 RX ADMIN — MORPHINE SULFATE 5 MG: 20 SOLUTION ORAL at 22:32

## 2025-01-29 RX ADMIN — Medication 600 MG: at 08:20

## 2025-01-29 RX ADMIN — MORPHINE SULFATE 5 MG: 20 SOLUTION ORAL at 15:20

## 2025-01-29 RX ADMIN — METOPROLOL SUCCINATE 50 MG: 50 TABLET, EXTENDED RELEASE ORAL at 08:21

## 2025-01-29 RX ADMIN — MORPHINE SULFATE 5 MG: 20 SOLUTION ORAL at 12:01

## 2025-01-29 RX ADMIN — LEVOTHYROXINE SODIUM 112 MCG: 112 TABLET ORAL at 08:21

## 2025-01-29 RX ADMIN — CARBIDOPA AND LEVODOPA 1 TABLET: 25; 100 TABLET ORAL at 08:20

## 2025-01-29 RX ADMIN — MORPHINE SULFATE 5 MG: 20 SOLUTION ORAL at 08:19

## 2025-01-29 RX ADMIN — MORPHINE SULFATE 5 MG: 20 SOLUTION ORAL at 18:08

## 2025-01-29 RX ADMIN — CARBIDOPA AND LEVODOPA 1 TABLET: 25; 100 TABLET ORAL at 15:08

## 2025-01-29 RX ADMIN — Medication 1 HALF-TAB: at 15:08

## 2025-01-29 RX ADMIN — Medication 25 MCG: at 08:21

## 2025-01-29 RX ADMIN — POTASSIUM CHLORIDE 20 MEQ: 1500 TABLET, EXTENDED RELEASE ORAL at 08:21

## 2025-01-29 ASSESSMENT — ACTIVITIES OF DAILY LIVING (ADL)
ADLS_ACUITY_SCORE: 42

## 2025-01-29 NOTE — PLAN OF CARE
Pt remains A/O x4. Pt continues to have elevated BPs at baseline managed with scheduled meds, otherwise vitally stable. Pt continues to deny chest pain and N/V. Pt reporting SOB at rest & with activity, on O2 at 2 LPM via NC. Pt continues to be stand by assist with walker and refuses gait belt. Pt continues to have chronic lower back pain managed per MAR. Pt continues to report numbness & tingling in BLE per baseline. Pt uses call light appropriately and able to make needs known. No acute changes this shift.     Goal Outcome Evaluation:    Plan of Care Reviewed With: patient    Overall Patient Progress: no change   No

## 2025-01-29 NOTE — PROGRESS NOTES
"[facility]    Medicine Progress Note - Hospitalist Service, GOLD TEAM 17    Date of Admission:  1/4/2025    Assessment & Plan     Patient is a 80 y/o man who has a past medical history significant for thyroid cancer with lung metastases, Parkinson's disease, neuropathy, glaucoma, osteoporosis, chronic pain, severe COPD, hyperlipidemia, obstructive sleep apnea and coronary artery disease. Patient presented on 1/04/2025 with a 7 day history of productive cough and a 1 day history of weakness. CT pulmonary angiogram was negative for pulmonary embolism. Patient as admitted with  COPD exacerbation  and possible  community-acquired pneumonia as well.      Patient reportedly saw Pulmonology in Oct-2024 and COPD was moderately well-controlled at that time. Activity was apparently limited much more by his balance and parkinsonism than by respiratory status. Patient has antibiotics and prednisone available for outpatient COPD flares but apparently has not recently used this.      Patient apparently just entered hospice in the past week prior to hospital admission. Patient stated on admission that he was too weak and he and his daughter called their hospice agency and 911 was called and he was brought into the hospital. When provider asked on day of presentation whether patient wished for hospitalization and treatment of his potential infection, patient stated that he was seeking treatment and reported that his goal was to \"be able to do things for myself\". It was discussed that hospice typically focused on comfort rather than diagnostic testing and aggressive medical treatment. Patient acknowledged this but had seemed unsure of what his true desires for his care are moving forward.      Patient was on ceftriaxone from 1/04 - 1/08.  azithromycin 500 mg from 1/05-1/07-,  zosyn 1/09-1/ 14- now on every other day       Patient's overall prognosis is poor. Patient has been seen by " "palliative care      1/29  - has not noted any changes in breathing , on exam for me bit better air exchange   - bit more shortness of breath , start prednsone 40 mg x 5 days then 2 mg daily as had been on it before   Awaits placement in LTC with hospice however patient  still wants  some restorative attempts   [facility] palliative input and SW help . Spoke with SW 1/22 and palliative care 1/23 and 1/24   Continue supportive cares  - peripheral smear pending          Pneumonia  Sepsis   - completed antibiotics  - on admission felt to have sepsis   - Patient initially was treated for a possible community-acquired pneumonia on admission with ceftriaxone and azithromycin.  - Patient subsequently was treated for possible hospital-acquired pneumonia with zosyn.     COPD exacerbation  Emphysema   Chronic hypoxemic respiratory failure  - shortness of breath  with minimal exertion  - sees pulmonary as out patient , has not been interested in pulmonary rehab   - recent prolonged hospitalization with COPD exacerbation    -PFTs of 3/24/2023 showing FEV1/FVC of 1.3/2.4 (46/65% predicted, respectively) for ratio 54%. TLC is reduced at 5.1 (74% predicted) and DLCO uncorrected is very low at 10.1 (41% predicted).   - was on prednisone 40 mg daily from 1/04/2025 to 1/08/2025. 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  - CT pulmonary angio 1/4 had no pulmonary embolism  . \"Waxing and waning pulmonary nodularity with a new 8 mm nodular  focus in the left lower lobe. Findings are likely secondary to a chronic infectious/inflammatory process including aspiration. Consider  short-term follow-up CT " "in one month for reassessment. 3. Additional sub-6 mm pulmonary nodules are stable compared to prior.  4. Moderate to severe emphysematous changes of the lung parenchyma with chronic fibrotic changes along the right minor fissure.\"  - Patient patient's daughter, patient may be placed on BiPAP if needed.      Coronary artery disease with past MI s/p stent Cx  1997  placement  HDL   - at time of initiall stent placement he was noted to have 80-90% lesion in nondominant RCA  and 50 % LAD that was treated medically   - Patient usually on aspirin and crestor but these are currently on hold.  - Patient continuing metoprolol succinate 50 mg daily.       Hypertension  - continue PTA metoprolol succinate.  - continue lasix     History of traumatic  subdural hemorrhage in 2023  - since resolved per NS follow up      Parkinson's disease  - recently diagnosed   -Has seen neurology and on sinemet and continue     Frequent falls  - with left femur fracture and inf pubic rami fracture, also history rib fracture        Metastatic thyroid cancer (multifocal papillary carcinoma)   Status post  thyroidectomy 2021   also received radioactive iodine   - had lymph node metastases,.  - Further surveillance as outpatient.  - continue levothyroxine         Right upper lobe lung mass,  s/p radiation therapy with possible radiation pneumonitis:  -  had previous VATS biopsy in Feb-2022 that was non-diagnostic.     New lung nodule in left lower lobe  - follow up  pulmonary l        Thrombocytopenia  -  last plt count was 72K 1/14, then went to  54K ---59K , was low on admission  - he denies alcohol consumption  - cont to monitor   - ASA on hold    - peripheral smear pending        Rheumatoid arthritis with positive rheumatoid factor:  - Per chart review, patient had once been on methotrexate and prednisone but had stopped these medications a few years ago.  - Patient to f/u as outpatient if desired.     Osteopenia   -had been on calcium and vit " D       History of immunodeficency  - IG subclass 2        Hypernatremia  - improved:        Neuropathy   chronic pain syndrome:  - Patient currently on gabapentin 400 mg three times a day.  --his  PTA robaxin 500mg TID has been on hold   -Hold PTA trazadone 50mg tab QHS  -Continue PTA oxycodone 5mg Q6H for pain       Glaucoma:  Dry eyes    - Patient not currently on medication for this.  - Patient to f/u with Ophthalmology as outpatient if further treatment is desired.       osteoporosis  h/o right femoral neck fracture s/p right total hip replacement   -hold PTA denosumab 60mg injection every 6 months (last 10/25/2024)  -restarted  PTA calcium , vitamin D  - prolia q 6 mo          Moderate malnutrition in the context of acute vs. chronic illness:  - Supplementing as able.       Patient  was ok for me to call his daughter Janey , I tried , there was no answer   He was seen by palliative cares , possible discharge  to Hospice, when I talked to py 1/28 he stated that t he wanted to get better         Diet: Snacks/Supplements Adult: Ensure Enlive; With Meals  Regular Diet Adult Thin Liquids (level 0)    DVT Prophylaxis: consider subcutaneous heparin   Mir Catheter: Not present  Lines: None     Cardiac Monitoring: None  Code Status: No CPR- Do NOT Intubate      Clinically Significant Risk Factors                 # Thrombocytopenia: Lowest platelets = 54 in last 2 days, will monitor for bleeding   # Hypertension: Noted on problem list     # Acute Hypoxic Respiratory Failure: Documented O2 saturation < 90%. Continue supplemental oxygen as needed          # Moderate Malnutrition: based on nutrition assessment    # Financial/Environmental Concerns:           Social Drivers of Health    Tobacco Use: Medium Risk (10/18/2024)    Patient History     Smoking Tobacco Use: Former     Smokeless Tobacco Use: Never   Physical Activity: Inactive (10/14/2024)    Exercise Vital Sign     Days of Exercise per Week: 0 days     Minutes  of Exercise per Session: 0 min   Stress: Stress Concern Present (10/14/2024)    [place] of Occupational Health - Occupational Stress Questionnaire     Feeling of Stress : Very much   Social Connections: Unknown (10/14/2024)    Social Connection and Isolation Panel [NHANES]     Frequency of Social Gatherings with Friends and Family: More than three times a week          Disposition Plan     Medically Ready for Discharge: Anticipated in 5+ Days once bed is available              Opal Bowman MD  Hospitalist Service, GOLD TEAM 17  [facility]  Securely message with [employer] (more info)  Text page via [email or web address] Paging/Directory   See signed in provider for up to date coverage information  ______________________________________________________________________    Interval History     Doing about the same, ha snot noted any changes for better or worse in breathing, no chest pain  no nausea vomiting   Physical Exam   Vital Signs: Temp: 98.1  F (36.7  C) Temp src: Oral BP: 120/72 Pulse: 89   Resp: 18 SpO2: 97 % O2 Device: Nasal cannula Oxygen Delivery: 2 LPM  Weight: 158 lbs 11.2 oz  General appearence: awake alert  , some shortness of breath  but in  no apparent distress    RESPIRATORY: lungs  with better air exchange , scattered exp  wheezing    CARDIOVASCULAR:S1 S2 regular rate and rhythm,  GASTROINTESTINAL:soft, non-distended , non-tender , + bowel sounds,    SKIN: warm and dry, no mottling noted   NEUROLOGIC; awake alert and oriented,   EXTREMITIES: no clubbing, cyanosis or edema       Data     I have personally reviewed the following data over the past 24 hrs:    4.5  \   11.1 (L)   / 59 (L)     N/A N/A N/A /  N/A   N/A N/A N/A \     Ferritin:  N/A % Retic:  4.4 (H) LDH:  N/A       Imaging results reviewed over the past 24 hrs:   Recent Results (from the past 24 hours)   XR Chest 2 Views    Narrative    Chest 2 views    INDICATION: Respiratory distress    COMPARISON:  1/14/2025    Findings: Heart size normal. Bandlike density consistent with fluid in  fissure and/or subsegmental atelectasis in the right middle and  streaky opacities in the left lower lung zone unchanged. No ectopic  air. Atherosclerotic calcification at the aortic knob. Bones are  osteopenic with slightly excessive thoracic kyphosis.      Impression    IMPRESSION: No significant changes mammographically from 2 weeks ago.  Atherosclerosis as well.    DAVID RUIZ MD         SYSTEM ID:  [id number]

## 2025-01-29 NOTE — PROGRESS NOTES
VS: Vital signs are stable, denies chest pain   O2: Pt on 2L NP   Output: Voids in toilet   Last BM: 1/29/2025   Activity: Activity as tolerated   Skin: Bruises noted BUE   Pain: Managed with PRN med   CMS: Numbness and tingling in BLE   Dressing: protective coccygeal dressing dry and intact.    Diet: regular   LDA: PIV SL   Equipment: Patient's walker   Plan: TBD   Additional Info:

## 2025-01-30 VITALS
RESPIRATION RATE: 18 BRPM | HEART RATE: 81 BPM | SYSTOLIC BLOOD PRESSURE: 106 MMHG | WEIGHT: 157.9 LBS | OXYGEN SATURATION: 97 % | BODY MASS INDEX: 23.66 KG/M2 | TEMPERATURE: 97.4 F | DIASTOLIC BLOOD PRESSURE: 58 MMHG

## 2025-01-30 PROCEDURE — 250N000012 HC RX MED GY IP 250 OP 636 PS 637: Performed by: INTERNAL MEDICINE

## 2025-01-30 PROCEDURE — 250N000013 HC RX MED GY IP 250 OP 250 PS 637: Performed by: INTERNAL MEDICINE

## 2025-01-30 PROCEDURE — 94640 AIRWAY INHALATION TREATMENT: CPT

## 2025-01-30 PROCEDURE — 94640 AIRWAY INHALATION TREATMENT: CPT | Mod: 76

## 2025-01-30 PROCEDURE — 250N000009 HC RX 250: Performed by: INTERNAL MEDICINE

## 2025-01-30 PROCEDURE — 250N000013 HC RX MED GY IP 250 OP 250 PS 637

## 2025-01-30 PROCEDURE — 999N000157 HC STATISTIC RCP TIME EA 10 MIN

## 2025-01-30 PROCEDURE — 120N000002 HC R&B MED SURG/OB UMMC

## 2025-01-30 PROCEDURE — 250N000013 HC RX MED GY IP 250 OP 250 PS 637: Performed by: STUDENT IN AN ORGANIZED HEALTH CARE EDUCATION/TRAINING PROGRAM

## 2025-01-30 PROCEDURE — 99232 SBSQ HOSP IP/OBS MODERATE 35: CPT | Performed by: INTERNAL MEDICINE

## 2025-01-30 PROCEDURE — 250N000009 HC RX 250

## 2025-01-30 RX ORDER — FUROSEMIDE 20 MG/1
20 TABLET ORAL
Status: DISCONTINUED | OUTPATIENT
Start: 2025-01-30 | End: 2025-02-18 | Stop reason: HOSPADM

## 2025-01-30 RX ADMIN — MORPHINE SULFATE 5 MG: 20 SOLUTION ORAL at 22:46

## 2025-01-30 RX ADMIN — METOPROLOL SUCCINATE 50 MG: 50 TABLET, EXTENDED RELEASE ORAL at 08:35

## 2025-01-30 RX ADMIN — MORPHINE SULFATE 5 MG: 20 SOLUTION ORAL at 08:38

## 2025-01-30 RX ADMIN — GABAPENTIN 400 MG: 300 CAPSULE ORAL at 08:37

## 2025-01-30 RX ADMIN — IPRATROPIUM BROMIDE AND ALBUTEROL SULFATE 3 ML: .5; 3 SOLUTION RESPIRATORY (INHALATION) at 09:11

## 2025-01-30 RX ADMIN — Medication 1 HALF-TAB: at 14:07

## 2025-01-30 RX ADMIN — IPRATROPIUM BROMIDE AND ALBUTEROL SULFATE 3 ML: .5; 3 SOLUTION RESPIRATORY (INHALATION) at 21:08

## 2025-01-30 RX ADMIN — LEVOTHYROXINE SODIUM 112 MCG: 112 TABLET ORAL at 08:38

## 2025-01-30 RX ADMIN — MORPHINE SULFATE 5 MG: 20 SOLUTION ORAL at 11:01

## 2025-01-30 RX ADMIN — CARBIDOPA AND LEVODOPA 1 TABLET: 25; 100 TABLET ORAL at 08:37

## 2025-01-30 RX ADMIN — Medication 1 HALF-TAB: at 08:38

## 2025-01-30 RX ADMIN — CARBIDOPA AND LEVODOPA 1 TABLET: 25; 100 TABLET ORAL at 20:28

## 2025-01-30 RX ADMIN — IPRATROPIUM BROMIDE AND ALBUTEROL SULFATE 3 ML: .5; 3 SOLUTION RESPIRATORY (INHALATION) at 17:11

## 2025-01-30 RX ADMIN — IPRATROPIUM BROMIDE AND ALBUTEROL SULFATE 3 ML: .5; 3 SOLUTION RESPIRATORY (INHALATION) at 12:25

## 2025-01-30 RX ADMIN — Medication 1 TABLET: at 08:38

## 2025-01-30 RX ADMIN — POTASSIUM CHLORIDE 20 MEQ: 1500 TABLET, EXTENDED RELEASE ORAL at 08:37

## 2025-01-30 RX ADMIN — FUROSEMIDE 20 MG: 20 TABLET ORAL at 16:29

## 2025-01-30 RX ADMIN — AZITHROMYCIN DIHYDRATE 500 MG: 250 TABLET ORAL at 08:36

## 2025-01-30 RX ADMIN — CARBIDOPA AND LEVODOPA 1 TABLET: 25; 100 TABLET ORAL at 14:07

## 2025-01-30 RX ADMIN — GABAPENTIN 400 MG: 300 CAPSULE ORAL at 14:06

## 2025-01-30 RX ADMIN — FUROSEMIDE 20 MG: 20 TABLET ORAL at 08:35

## 2025-01-30 RX ADMIN — GABAPENTIN 400 MG: 300 CAPSULE ORAL at 20:28

## 2025-01-30 RX ADMIN — PREDNISONE 40 MG: 20 TABLET ORAL at 08:35

## 2025-01-30 RX ADMIN — ROSUVASTATIN 10 MG: 10 TABLET, FILM COATED ORAL at 08:37

## 2025-01-30 RX ADMIN — Medication 1 HALF-TAB: at 20:27

## 2025-01-30 RX ADMIN — Medication 25 MCG: at 08:38

## 2025-01-30 RX ADMIN — IPRATROPIUM BROMIDE AND ALBUTEROL SULFATE 3 ML: .5; 3 SOLUTION RESPIRATORY (INHALATION) at 04:31

## 2025-01-30 RX ADMIN — MORPHINE SULFATE 5 MG: 20 SOLUTION ORAL at 18:31

## 2025-01-30 RX ADMIN — MORPHINE SULFATE 5 MG: 20 SOLUTION ORAL at 14:06

## 2025-01-30 RX ADMIN — Medication 600 MG: at 08:36

## 2025-01-30 ASSESSMENT — ACTIVITIES OF DAILY LIVING (ADL)
ADLS_ACUITY_SCORE: 42
ADLS_ACUITY_SCORE: 42
ADLS_ACUITY_SCORE: 43
ADLS_ACUITY_SCORE: 42
ADLS_ACUITY_SCORE: 43
ADLS_ACUITY_SCORE: 42
ADLS_ACUITY_SCORE: 42
ADLS_ACUITY_SCORE: 43
ADLS_ACUITY_SCORE: 42
ADLS_ACUITY_SCORE: 43
ADLS_ACUITY_SCORE: 42
ADLS_ACUITY_SCORE: 43
ADLS_ACUITY_SCORE: 42
ADLS_ACUITY_SCORE: 43
ADLS_ACUITY_SCORE: 42
ADLS_ACUITY_SCORE: 43
ADLS_ACUITY_SCORE: 42

## 2025-01-30 NOTE — PROGRESS NOTES
"[facility]    Medicine Progress Note - Hospitalist Service, GOLD TEAM 17    Date of Admission:  1/4/2025    Assessment & Plan     Patient is a 78 y/o man who has a past medical history significant for thyroid cancer with lung metastases, Parkinson's disease, neuropathy, glaucoma, osteoporosis, chronic pain, severe COPD, hyperlipidemia, obstructive sleep apnea and coronary artery disease. Patient presented on 1/04/2025 with a 7 day history of productive cough and a 1 day history of weakness. CT pulmonary angiogram was negative for pulmonary embolism. Patient as admitted with  COPD exacerbation  and possible  community-acquired pneumonia as well.      Patient reportedly saw Pulmonology in Oct-2024 and COPD was moderately well-controlled at that time. Activity was apparently limited much more by his balance and parkinsonism than by respiratory status. Patient has antibiotics and prednisone available for outpatient COPD flares but apparently has not recently used this.      Patient apparently just entered hospice in the past week prior to hospital admission. Patient stated on admission that he was too weak and he and his daughter called their hospice agency and 911 was called and he was brought into the hospital. When provider asked on day of presentation whether patient wished for hospitalization and treatment of his potential infection, patient stated that he was seeking treatment and reported that his goal was to \"be able to do things for myself\". It was discussed that hospice typically focused on comfort rather than diagnostic testing and aggressive medical treatment. Patient acknowledged this but had seemed unsure of what his true desires for his care are moving forward.      Patient was on ceftriaxone from 1/04 - 1/08.  azithromycin 500 mg from 1/05-1/07-,  zosyn 1/09-1/ 14- now on every other day       Patient's overall prognosis is poor. Patient has been seen by " "palliative care      1/30   - has not noted any changes in breathing , on exam for me bit better air exchange   - bit more shortness of breath , start prednsone 40 mg x 5 days then 2 mg daily as had been on it before   Awaits placement in LTC with hospice however patient  still wants  some restorative attempts   Silvia palliative input and SW help . Spoke with SW 1/22 and palliative care 1/23 and 1/24   Continue supportive cares  - increase lasix to bid form daily, monitor K         Pneumonia  Sepsis   - completed antibiotics  - on admission felt to have sepsis   - Patient initially was treated for a possible community-acquired pneumonia on admission with ceftriaxone and azithromycin.  - Patient subsequently was treated for possible hospital-acquired pneumonia with zosyn.     COPD exacerbation  Emphysema   Chronic hypoxemic respiratory failure  - shortness of breath  with minimal exertion  - sees pulmonary as out patient , has not been interested in pulmonary rehab   - recent prolonged hospitalization with COPD exacerbation    -PFTs of 3/24/2023 showing FEV1/FVC of 1.3/2.4 (46/65% predicted, respectively) for ratio 54%. TLC is reduced at 5.1 (74% predicted) and DLCO uncorrected is very low at 10.1 (41% predicted).   - was on prednisone 40 mg daily from 1/04/2025 to 1/08/2025. 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  - CT pulmonary angio 1/4 had no pulmonary embolism  . \"Waxing and waning pulmonary nodularity with a new 8 mm nodular  focus in the left lower lobe. Findings are likely secondary to a chronic infectious/inflammatory process including aspiration. " "Consider  short-term follow-up CT in one month for reassessment. 3. Additional sub-6 mm pulmonary nodules are stable compared to prior.  4. Moderate to severe emphysematous changes of the lung parenchyma with chronic fibrotic changes along the right minor fissure.\"  - Patient patient's daughter, patient may be placed on BiPAP if needed.  - suspect has element of pulmonary hypertension , on lasix, did increase to bid 1/30       Coronary artery disease with past MI s/p stent Cx  1997  placement  HDL   - at time of initiall stent placement he was noted to have 80-90% lesion in nondominant RCA  and 50 % LAD that was treated medically   - Patient usually on aspirin and crestor but these are currently on hold.  - Patient continuing metoprolol succinate 50 mg daily.       Hypertension  - continue PTA metoprolol succinate.  - continue lasix , increased to bid as above 1/30     History of traumatic  subdural hemorrhage in 2023  - since resolved per NS follow up      Parkinson's disease  - recently diagnosed   -Has seen neurology and on sinemet and continue     Frequent falls  - with left femur fracture and inf pubic rami fracture, also history rib fracture        Metastatic thyroid cancer (multifocal papillary carcinoma)   Status post  thyroidectomy 2021   also received radioactive iodine   - had lymph node metastases,.  - Further surveillance as outpatient.  - continue levothyroxine         Right upper lobe lung mass,  s/p radiation therapy with possible radiation pneumonitis:  -  had previous VATS biopsy in Feb-2022 that was non-diagnostic.     New lung nodule in left lower lobe  - follow up  pulmonary l        Thrombocytopenia  -  last plt count was 72K 1/14, then went to  54K ---59K , was low on admission  - he denies alcohol consumption  - cont to monitor   - ASA on hold    - peripheral smear nonrevealing        Rheumatoid arthritis with positive rheumatoid factor:  - Per chart review, patient had once been on " methotrexate and prednisone but had stopped these medications a few years ago.  - Patient to f/u as outpatient if desired.     Osteopenia   -had been on calcium and vit D       History of immunodeficency  - IG subclass 2        Hypernatremia  - improved:        Neuropathy   chronic pain syndrome:  - Patient currently on gabapentin 400 mg three times a day.  --his  PTA robaxin 500mg TID has been on hold   -Hold PTA trazadone 50mg tab QHS  -Continue PTA oxycodone 5mg Q6H for pain       Glaucoma:  Dry eyes    - Patient not currently on medication for this.  - Patient to f/u with Ophthalmology as outpatient if further treatment is desired.       osteoporosis  h/o right femoral neck fracture s/p right total hip replacement   -hold PTA denosumab 60mg injection every 6 months (last 10/25/2024)  -restarted  PTA calcium , vitamin D  - prolia q 6 mo          Moderate malnutrition in the context of acute vs. chronic illness:  - Supplementing as able.       Patient  was ok for me to call his daughter Janey , I tried , there was no answer   He was seen by palliative cares , possible discharge  to Hospice, when I talked to py 1/28 he stated that t he wanted to get better         Diet: Snacks/Supplements Adult: Ensure Enlive; With Meals  Regular Diet Adult Thin Liquids (level 0)    DVT Prophylaxis: consider subcutaneous heparin   Mir Catheter: Not present  Lines: None     Cardiac Monitoring: None  Code Status: No CPR- Do NOT Intubate      Clinically Significant Risk Factors                 # Thrombocytopenia: Lowest platelets = 54 in last 2 days, will monitor for bleeding   # Hypertension: Noted on problem list     # Acute Hypoxic Respiratory Failure: Documented O2 saturation < 90%. Continue supplemental oxygen as needed          # Moderate Malnutrition: based on nutrition assessment    # Financial/Environmental Concerns:           Social Drivers of Health    Tobacco Use: Medium Risk (10/18/2024)    Patient History     Smoking  Tobacco Use: Former     Smokeless Tobacco Use: Never   Physical Activity: Inactive (10/14/2024)    Exercise Vital Sign     Days of Exercise per Week: 0 days     Minutes of Exercise per Session: 0 min   Stress: Stress Concern Present (10/14/2024)    [employer] - Occupational Stress Questionnaire     Feeling of Stress : Very much   Social Connections: Unknown (10/14/2024)    Social Connection and Isolation Panel [NHANES]     Frequency of Social Gatherings with Friends and Family: More than three times a week          Disposition Plan     Medically Ready for Discharge: Anticipated in 5+ Days once bed is available              Opal Bowman MD  Hospitalist Service, GOLD TEAM 17  [facility]  Securely message with [employer] (more info)  Text page via [employer] Paging/Directory   See signed in provider for up to date coverage information  ______________________________________________________________________    Interval History     He does seem short of breath , perhaps bit mote form yesterday   Physical Exam   Vital Signs: Temp: 97.9  F (36.6  C) Temp src: Oral BP: 118/61 Pulse: 73   Resp: 18 SpO2: 96 % O2 Device: Nasal cannula Oxygen Delivery: 2 LPM  Weight: 158 lbs 11.2 oz  General appearence: awake alert  , some shortness of breath  but in  no apparent distress    RESPIRATORY: lungs  scattered exp wheezing     CARDIOVASCULAR:S1 S2 regular rate and rhythm,  GASTROINTESTINAL:soft, non-distended , non-tender , + bowel sounds,    SKIN: warm and dry, no mottling noted   NEUROLOGIC; awake alert and oriented,   EXTREMITIES: no clubbing, cyanosis or edema       Data         Imaging results reviewed over the past 24 hrs:   No results found for this or any previous visit (from the past 24 hours).

## 2025-01-30 NOTE — PROGRESS NOTES
Care Management Follow Up    Length of Stay (days): 26    Expected Discharge Date: 02/03/2025     Concerns to be Addressed: discharge planning     Patient plan of care discussed at interdisciplinary rounds: Yes    Anticipated Discharge Disposition: Home, Hospice              Anticipated Discharge Services: Other (see comment) (current on home hospice)  Anticipated Discharge DME: None    Patient/family educated on Medicare website which has current facility and service quality ratings:    Education Provided on the Discharge Plan: Yes  Patient/Family in Agreement with the Plan:      Referrals Placed by CM/SW: Homecare (currently on home hospice)  Private pay costs discussed: Not applicable    Discussed  Partnership in Safe Discharge Planning  document with patient/family: Yes:     Handoff Completed: No, handoff not indicated or clinically appropriate    Additional Information:  SW met  with Pt to obtain LTC and hospice recommendations. Pt appeared to have  physically shaking hands which appeared different than previous interactions. SW requested list  of LTC facilities that SW had dropped of around noon Tuesday and which Pt had identified  he would review  with his daughter and  sister and have for  SW Thursday afternoon. Pt identified that both his sister  and his daughter  have been sick and in bed for  the pat few days and thus have been unable  to provide feedback on LTC choices. SW  validated Pt's concern for family and identified that  he will  stop by again tomorrow.     Next Steps:   SW/RNCC will continue to follow for safe discharge placement and planning.     Antonio Houston, [facility]  [facility]   Ph: [phone]

## 2025-01-30 NOTE — PLAN OF CARE
VS: VSS, pt denied CP some shortness of breathing during activity.   O2: On oxygen at 2 L per NC, sat. > 90%.   Output: Voiding adequate amount in the bathroom.    Last BM: 01/29/25   Activity: Up with walker and SBA.    Skin: Intact except Bruises and scab on BUE   Pain: Some discomfort medicated with PRN medication.    Neuro: CMS and neuro intact to baseline.    Dressing: Protective dressing on coccyx.    Diet: Regular tolerating okay.    LDA: FER GUZMAN.    Equipment: IV pole, walker and personal belongings.    Plan: TBD.    Additional Info:

## 2025-01-30 NOTE — PLAN OF CARE
Goal Outcome Evaluation:           Overall Patient Progress: no changeOverall Patient Progress: no change         VS: /60 (BP Location: Left arm)   Pulse 62   Temp 97.4  F (36.3  C) (Oral)   Resp 18   Wt 72 kg (158 lb 11.2 oz)   SpO2 95%   BMI 23.78 kg/m      O2: > 88% on 2 L via NC, SOB same as yesterday, denies chest pain   Inspiratory wheezes in all lobes. Intermittent cough   Output: Voids spontaneously and adequately    Last BM: 1/29/2025   Activity: Ax1 with walker, refuses gait belt    Skin: Bruises and scabs to BUE, mepliex on sacrum   Mepilex on skin tear RUE; dressing changed today.    Pain: Pain managed with scheduled meds    CMS: AO x4, denies numbness and tingling    Dressing: Mepliex to LUE, mepliex on sacrum    Diet: Regular    LDA: R PIV SL   Equipment: Walker, IV pole, call light and personal belongings   Plan: Continue POC: Awaits placement in LTC with hospice vs. palliative care    Additional Info:

## 2025-01-30 NOTE — PLAN OF CARE
Goal Outcome Evaluation:      Plan of Care Reviewed With: patient    Overall Patient Progress: improvingOverall Patient Progress: improving       VS: /61   Pulse 73   Temp 97.9  F (36.6  C) (Oral)   Resp 18   Wt 72 kg (158 lb 11.2 oz)   SpO2 97%   BMI 23.78 kg/m      O2: > 88% on 2 L via NC, SOB reported on exertion, denies chest pain    Output: Voids spontaneously and adequately    Last BM: 1/29/2025   Activity: Ax1 with walker, refuses gait belt    Skin: Bruises and scabs to BUE, mepliex on sacrum    Pain: Reports 5/10 back pain, managed with scheduled morphine    CMS: AO x4, reports numbness and tingling to BLE (baseline)    Dressing: Mepliex to LUE, mepliex on sacrum    Diet: Regular    LDA: R PIV SL   Equipment: Walker, IV pole, call light and personal belongings   Plan: Continue POC   Additional Info:

## 2025-01-31 LAB
BASOPHILS # BLD AUTO: 0 10E3/UL (ref 0–0.2)
BASOPHILS NFR BLD AUTO: 1 %
EOSINOPHIL # BLD AUTO: 0.1 10E3/UL (ref 0–0.7)
EOSINOPHIL NFR BLD AUTO: 2 %
ERYTHROCYTE [DISTWIDTH] IN BLOOD BY AUTOMATED COUNT: 16.5 % (ref 10–15)
HCT VFR BLD AUTO: 30.8 % (ref 40–53)
HGB BLD-MCNC: 10.3 G/DL (ref 13.3–17.7)
IMM GRANULOCYTES # BLD: 0.2 10E3/UL
IMM GRANULOCYTES NFR BLD: 4 %
LYMPHOCYTES # BLD AUTO: 1.1 10E3/UL (ref 0.8–5.3)
LYMPHOCYTES NFR BLD AUTO: 19 %
MCH RBC QN AUTO: 31.3 PG (ref 26.5–33)
MCHC RBC AUTO-ENTMCNC: 33.4 G/DL (ref 31.5–36.5)
MCV RBC AUTO: 94 FL (ref 78–100)
MONOCYTES # BLD AUTO: 0.5 10E3/UL (ref 0–1.3)
MONOCYTES NFR BLD AUTO: 8 %
NEUTROPHILS # BLD AUTO: 4 10E3/UL (ref 1.6–8.3)
NEUTROPHILS NFR BLD AUTO: 68 %
NRBC # BLD AUTO: 0.1 10E3/UL
NRBC BLD AUTO-RTO: 1 /100
PLATELET # BLD AUTO: 85 10E3/UL (ref 150–450)
RBC # BLD AUTO: 3.29 10E6/UL (ref 4.4–5.9)
WBC # BLD AUTO: 5.9 10E3/UL (ref 4–11)

## 2025-01-31 PROCEDURE — 250N000013 HC RX MED GY IP 250 OP 250 PS 637: Performed by: INTERNAL MEDICINE

## 2025-01-31 PROCEDURE — 85004 AUTOMATED DIFF WBC COUNT: CPT | Performed by: INTERNAL MEDICINE

## 2025-01-31 PROCEDURE — 250N000012 HC RX MED GY IP 250 OP 636 PS 637: Performed by: INTERNAL MEDICINE

## 2025-01-31 PROCEDURE — 250N000009 HC RX 250

## 2025-01-31 PROCEDURE — 250N000013 HC RX MED GY IP 250 OP 250 PS 637

## 2025-01-31 PROCEDURE — 250N000009 HC RX 250: Performed by: INTERNAL MEDICINE

## 2025-01-31 PROCEDURE — 120N000002 HC R&B MED SURG/OB UMMC

## 2025-01-31 PROCEDURE — 999N000157 HC STATISTIC RCP TIME EA 10 MIN

## 2025-01-31 PROCEDURE — 250N000013 HC RX MED GY IP 250 OP 250 PS 637: Performed by: STUDENT IN AN ORGANIZED HEALTH CARE EDUCATION/TRAINING PROGRAM

## 2025-01-31 PROCEDURE — 94640 AIRWAY INHALATION TREATMENT: CPT

## 2025-01-31 PROCEDURE — 94640 AIRWAY INHALATION TREATMENT: CPT | Mod: 76

## 2025-01-31 PROCEDURE — 99232 SBSQ HOSP IP/OBS MODERATE 35: CPT | Performed by: INTERNAL MEDICINE

## 2025-01-31 PROCEDURE — 36415 COLL VENOUS BLD VENIPUNCTURE: CPT | Performed by: INTERNAL MEDICINE

## 2025-01-31 RX ADMIN — LEVOTHYROXINE SODIUM 112 MCG: 112 TABLET ORAL at 09:06

## 2025-01-31 RX ADMIN — MORPHINE SULFATE 5 MG: 20 SOLUTION ORAL at 22:37

## 2025-01-31 RX ADMIN — Medication 600 MG: at 09:04

## 2025-01-31 RX ADMIN — IPRATROPIUM BROMIDE AND ALBUTEROL SULFATE 3 ML: .5; 3 SOLUTION RESPIRATORY (INHALATION) at 00:38

## 2025-01-31 RX ADMIN — IPRATROPIUM BROMIDE AND ALBUTEROL SULFATE 3 ML: .5; 3 SOLUTION RESPIRATORY (INHALATION) at 13:07

## 2025-01-31 RX ADMIN — PREDNISONE 40 MG: 20 TABLET ORAL at 09:06

## 2025-01-31 RX ADMIN — IPRATROPIUM BROMIDE AND ALBUTEROL SULFATE 3 ML: .5; 3 SOLUTION RESPIRATORY (INHALATION) at 20:18

## 2025-01-31 RX ADMIN — Medication 25 MCG: at 09:06

## 2025-01-31 RX ADMIN — METOPROLOL SUCCINATE 50 MG: 50 TABLET, EXTENDED RELEASE ORAL at 11:09

## 2025-01-31 RX ADMIN — FUROSEMIDE 20 MG: 20 TABLET ORAL at 17:00

## 2025-01-31 RX ADMIN — GABAPENTIN 400 MG: 300 CAPSULE ORAL at 09:06

## 2025-01-31 RX ADMIN — CARBIDOPA AND LEVODOPA 1 TABLET: 25; 100 TABLET ORAL at 09:05

## 2025-01-31 RX ADMIN — CARBIDOPA AND LEVODOPA 1 TABLET: 25; 100 TABLET ORAL at 20:04

## 2025-01-31 RX ADMIN — MORPHINE SULFATE 5 MG: 20 SOLUTION ORAL at 11:09

## 2025-01-31 RX ADMIN — Medication 1 HALF-TAB: at 20:03

## 2025-01-31 RX ADMIN — IPRATROPIUM BROMIDE AND ALBUTEROL SULFATE 3 ML: .5; 3 SOLUTION RESPIRATORY (INHALATION) at 08:53

## 2025-01-31 RX ADMIN — MORPHINE SULFATE 5 MG: 20 SOLUTION ORAL at 14:01

## 2025-01-31 RX ADMIN — IPRATROPIUM BROMIDE AND ALBUTEROL SULFATE 3 ML: .5; 3 SOLUTION RESPIRATORY (INHALATION) at 17:06

## 2025-01-31 RX ADMIN — Medication 1 TABLET: at 09:05

## 2025-01-31 RX ADMIN — MORPHINE SULFATE 5 MG: 20 SOLUTION ORAL at 09:03

## 2025-01-31 RX ADMIN — GABAPENTIN 400 MG: 300 CAPSULE ORAL at 14:02

## 2025-01-31 RX ADMIN — FUROSEMIDE 20 MG: 20 TABLET ORAL at 09:10

## 2025-01-31 RX ADMIN — Medication 1 HALF-TAB: at 14:02

## 2025-01-31 RX ADMIN — GABAPENTIN 400 MG: 300 CAPSULE ORAL at 20:04

## 2025-01-31 RX ADMIN — ROSUVASTATIN 10 MG: 10 TABLET, FILM COATED ORAL at 09:05

## 2025-01-31 RX ADMIN — MORPHINE SULFATE 5 MG: 20 SOLUTION ORAL at 18:06

## 2025-01-31 RX ADMIN — POTASSIUM CHLORIDE 20 MEQ: 1500 TABLET, EXTENDED RELEASE ORAL at 09:06

## 2025-01-31 RX ADMIN — CARBIDOPA AND LEVODOPA 1 TABLET: 25; 100 TABLET ORAL at 14:02

## 2025-01-31 RX ADMIN — AMLODIPINE BESYLATE 5 MG: 5 TABLET ORAL at 22:37

## 2025-01-31 RX ADMIN — Medication 1 HALF-TAB: at 09:06

## 2025-01-31 ASSESSMENT — ACTIVITIES OF DAILY LIVING (ADL)
ADLS_ACUITY_SCORE: 43

## 2025-01-31 NOTE — PLAN OF CARE
Goal Outcome Evaluation:      Plan of Care Reviewed With: patient    Overall Patient Progress: improvingOverall Patient Progress: improving       VS: /58   Pulse 81   Temp 97.4  F (36.3  C) (Oral)   Resp 18   Wt 71.6 kg (157 lb 14.4 oz)   SpO2 97%   BMI 23.66 kg/m      O2: > 88% on 2L via NC, SOB on exertion, denies chest pain    Output: Voids spontaneously and adequately    Last BM: 1/29/2025   Activity: Ax1 using walker, refuses gait belt    Skin: Bruises and scabs to BUE, mepilex on sacrum    Pain: Reports back pain, pain managed with scheduled morphine    CMS: AO x4, reports numbness and tingling to BLE (baseline)    Dressing: Mepilex to sacrum, band aids on BUE   Diet: Regular    LDA: R PIV SL   Equipment: Walker, IV pole, call light and personal belongings    Plan: Continue POC   Additional Info:

## 2025-01-31 NOTE — PROGRESS NOTES
[id number]    Pt is doing well. Denied chest pain, N/V. Baseline N/T to extremities. Some dypnea on exertion and with activity per pt. Inspiratory wheezing heard. On 1 L via NC. Right PIV flushing well. Up to toilet with 1 assist- New mepilex applied to pt's coccyx area. Mepilex on right arm covering skin tear. Call light within reach, bed in low position, no other concerns as of now, continue with POC.    Patient most recent vitals:  /74 (BP Location: Left arm, Cuff Size: Adult Regular)   Pulse 71   Temp 98.2  F (36.8  C) (Oral)   Resp 18   Wt 71.7 kg (158 lb)   SpO2 95%   BMI 23.67 kg/m

## 2025-01-31 NOTE — PROGRESS NOTES
Care Management Follow Up    Length of Stay (days): 27    Expected Discharge Date: 02/03/2025     Concerns to be Addressed: discharge planning     Patient plan of care discussed at interdisciplinary rounds: Yes    Anticipated Discharge Disposition: Home, Hospice              Anticipated Discharge Services: Other (see comment) (current on home hospice)  Anticipated Discharge DME: None    Patient/family educated on Medicare website which has current facility and service quality ratings:    Education Provided on the Discharge Plan: Yes  Patient/Family in Agreement with the Plan:  Yes    Referrals Placed by CM/SW: Homecare (currently on home hospice)  Private pay costs discussed: Not applicable    Discussed  Partnership in Safe Discharge Planning  document with patient/family: Yes:     Handoff Completed: No, handoff not indicated or clinically appropriate    Additional Information:  Pt  reviewed in rounds. SW  met with Pt to obtain LTC/hospice list around 1PM. Pt identified for SW to come back later. SW returned later in the afternoon and Pt was sleeping. SW will place Pt on the weekend list to obtain LTC/hospice list.     Next Steps:   SW/RNCC will continue to follow for safe discharge placement and planning.     Antonoi Houston, [facility]  [facility] [place] [facility]   Ph: [phone]

## 2025-01-31 NOTE — PROGRESS NOTES
"[facility]    Medicine Progress Note - Hospitalist Service, GOLD TEAM 17    Date of Admission:  1/4/2025    Assessment & Plan     Patient is a 80 y/o man who has a past medical history significant for thyroid cancer with lung metastases, Parkinson's disease, neuropathy, glaucoma, osteoporosis, chronic pain, severe COPD, hyperlipidemia, obstructive sleep apnea and coronary artery disease. Patient presented on 1/04/2025 with a 7 day history of productive cough and a 1 day history of weakness. CT pulmonary angiogram was negative for pulmonary embolism. Patient as admitted with  COPD exacerbation  and possible  community-acquired pneumonia as well.      Patient reportedly saw Pulmonology in Oct-2024 and COPD was moderately well-controlled at that time. Activity was apparently limited much more by his balance and parkinsonism than by respiratory status. Patient has antibiotics and prednisone available for outpatient COPD flares but apparently has not recently used this.      Patient apparently just entered hospice in the past week prior to hospital admission. Patient stated on admission that he was too weak and he and his daughter called their hospice agency and 911 was called and he was brought into the hospital. When provider asked on day of presentation whether patient wished for hospitalization and treatment of his potential infection, patient stated that he was seeking treatment and reported that his goal was to \"be able to do things for myself\". It was discussed that hospice typically focused on comfort rather than diagnostic testing and aggressive medical treatment. Patient acknowledged this but had seemed unsure of what his true desires for his care are moving forward.      Patient was on ceftriaxone from 1/04 - 1/08.  azithromycin 500 mg from 1/05-1/07-,  zosyn 1/09-1/ 14- now on every other day       Patient's overall prognosis is poor. Patient has been seen by " "palliative care      1/31  - breathing not better or worse  - increased lasix to bid 1/30 , monitor K         Pneumonia  Sepsis   - completed antibiotics  - on admission felt to have sepsis   - Patient initially was treated for a possible community-acquired pneumonia on admission with ceftriaxone and azithromycin.  - Patient subsequently was treated for possible hospital-acquired pneumonia with zosyn.     COPD exacerbation  Emphysema   Chronic hypoxemic respiratory failure  - shortness of breath  with minimal exertion  - sees pulmonary as out patient , has not been interested in pulmonary rehab   - recent prolonged hospitalization with COPD exacerbation    -PFTs of 3/24/2023 showing FEV1/FVC of 1.3/2.4 (46/65% predicted, respectively) for ratio 54%. TLC is reduced at 5.1 (74% predicted) and DLCO uncorrected is very low at 10.1 (41% predicted).   - was on prednisone 40 mg daily from 1/04/2025 to 1/08/2025. 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  - CT pulmonary angio 1/4 had no pulmonary embolism  . \"Waxing and waning pulmonary nodularity with a new 8 mm nodular  focus in the left lower lobe. Findings are likely secondary to a chronic infectious/inflammatory process including aspiration. Consider  short-term follow-up CT in one month for reassessment. 3. Additional sub-6 mm pulmonary nodules are stable compared to prior.  4. Moderate to severe emphysematous changes of the lung parenchyma with chronic fibrotic changes along the right minor fissure.\"  - Patient patient's daughter, patient may be placed on BiPAP if needed.  - suspect has element of pulmonary hypertension , " on lasix, did increase to bid 1/30       Coronary artery disease with past MI s/p stent Cx  1997  placement  HDL   - at time of initiall stent placement he was noted to have 80-90% lesion in nondominant RCA  and 50 % LAD that was treated medically   - Patient usually on aspirin and crestor but these are currently on hold.  - Patient continuing metoprolol succinate 50 mg daily.       Hypertension  - continue PTA metoprolol succinate.  - continue lasix , increased to bid as above 1/30     History of traumatic  subdural hemorrhage in 2023  - since resolved per NS follow up      Parkinson's disease  - recently diagnosed   -Has seen neurology and on sinemet and continue     Frequent falls  - with left femur fracture and inf pubic rami fracture, also history rib fracture        Metastatic thyroid cancer (multifocal papillary carcinoma)   Status post  thyroidectomy 2021   also received radioactive iodine   - had lymph node metastases,.  - Further surveillance as outpatient.  - continue levothyroxine         Right upper lobe lung mass,  s/p radiation therapy with possible radiation pneumonitis:  -  had previous VATS biopsy in Feb-2022 that was non-diagnostic.     New lung nodule in left lower lobe  - follow up  pulmonary l        Thrombocytopenia  -  last plt count was 72K 1/14, then went to  54K ---59K , was low on admission  - he denies alcohol consumption  - cont to monitor   - ASA on hold    - peripheral smear nonrevealing        Rheumatoid arthritis with positive rheumatoid factor:  - Per chart review, patient had once been on methotrexate and prednisone but had stopped these medications a few years ago.  - Patient to f/u as outpatient if desired.     Osteopenia   -had been on calcium and vit D       History of immunodeficency  - IG subclass 2        Hypernatremia  - improved:        Neuropathy   chronic pain syndrome:  - Patient currently on gabapentin 400 mg three times a day.  --his  PTA robaxin 500mg TID has been  on hold   -Hold PTA trazadone 50mg tab QHS  -Continue PTA oxycodone 5mg Q6H for pain       Glaucoma:  Dry eyes    - Patient not currently on medication for this.  - Patient to f/u with Ophthalmology as outpatient if further treatment is desired.       osteoporosis  h/o right femoral neck fracture s/p right total hip replacement   -hold PTA denosumab 60mg injection every 6 months (last 10/25/2024)  -restarted  PTA calcium , vitamin D  - prolia q 6 mo          Moderate malnutrition in the context of acute vs. chronic illness:  - Supplementing as able.       Patient  was ok for me to call his daughter Janey , I tried , there was no answer   He was seen by palliative cares , possible discharge  to Hospice, when I talked to py 1/28 he stated that t he wanted to get better         Diet: Snacks/Supplements Adult: Ensure Enlive; With Meals  Regular Diet Adult Thin Liquids (level 0)    DVT Prophylaxis: consider subcutaneous heparin   Mir Catheter: Not present  Lines: None     Cardiac Monitoring: None  Code Status: No CPR- Do NOT Intubate      Clinically Significant Risk Factors                   # Hypertension: Noted on problem list     # Acute Hypoxic Respiratory Failure: Documented O2 saturation < 90%. Continue supplemental oxygen as needed          # Moderate Malnutrition: based on nutrition assessment    # Financial/Environmental Concerns:           Social Drivers of Health    Tobacco Use: Medium Risk (10/18/2024)    Patient History     Smoking Tobacco Use: Former     Smokeless Tobacco Use: Never   Physical Activity: Inactive (10/14/2024)    Exercise Vital Sign     Days of Exercise per Week: 0 days     Minutes of Exercise per Session: 0 min   Stress: Stress Concern Present (10/14/2024)    [place] [employer]ational [employer] - Occupational Stress Questionnaire     Feeling of Stress : Very much   Social Connections: Unknown (10/14/2024)    Social Connection and Isolation Panel [NHANES]     Frequency of Social  Gatherings with Friends and Family: More than three times a week          Disposition Plan     Medically Ready for Discharge: Anticipated in 5+ Days once bed is available              Opal Bowman MD  Hospitalist Service, GOLD TEAM 17  [facility]  Securely message with [employer] (more info)  Text page via [facility] Paging/Directory   See signed in provider for up to date coverage information  ______________________________________________________________________    Interval History     Has not noted improvement or worsening of breathing, doing ok, still gets shortness of breath  with minimal exertion   Physical Exam   Vital Signs: Temp: 98.2  F (36.8  C) Temp src: Oral BP: (!) 146/74 Pulse: 62   Resp: 18 SpO2: 99 % O2 Device: Nasal cannula Oxygen Delivery: 2 LPM  Weight: 157 lbs 14.4 oz  General appearence: awake alert  , some shortness of breath  but in  no apparent distress    RESPIRATORY: lungs  scattered exp wheezing     CARDIOVASCULAR:S1 S2 regular rate and rhythm,  GASTROINTESTINAL:soft, non-distended , non-tender , + bowel sounds,    SKIN: warm and dry, no mottling noted   NEUROLOGIC; awake alert and oriented,   EXTREMITIES: no clubbing, cyanosis or edema       Data         Imaging results reviewed over the past 24 hrs:   No results found for this or any previous visit (from the past 24 hours).

## 2025-01-31 NOTE — PROGRESS NOTES
BP (!) 146/74 (BP Location: Left arm)   Pulse 62   Temp 98.2  F (36.8  C) (Oral)   Resp 18   Wt 71.7 kg (158 lb)   SpO2 98%   BMI 23.67 kg/m       Paged Medicine MD to see if Metoprolol should be held. Medicine MD states ok to give.

## 2025-01-31 NOTE — PLAN OF CARE
Goal Outcome Evaluation:      Plan of Care Reviewed With: patient    Overall Patient Progress: no changeOverall Patient Progress: no change    Outcome Evaluation: Pt is A&O X4. VSS. on 2L via NC and maintained sats betweeen 88-92%. Severe SOB with exertion. Up with A X1 with walker. Refused gait belt. LS diminished at posterior base and expiratory wheezing other areas. PIV SL into R forearm. Pt.c/o lower back pain, managed with scheduled  PO morphine. Plan: waiting for placement. Will continue to monitor.

## 2025-01-31 NOTE — PLAN OF CARE
Goal Outcome Evaluation:           Overall Patient Progress: no changeOverall Patient Progress: no change         VS: BP (!) 146/74 (BP Location: Left arm)   Pulse 62   Temp 98.2  F (36.8  C) (Oral)   Resp 18   Wt 71.7 kg (158 lb)   SpO2 98%   BMI 23.67 kg/m     Denies chest pain.    O2: > 88% on 2L via NC; weaned to 1L. O2 to be between 88-92%  SOB same as yesterday  Inspiratory wheezes, intermittent cough  On baseline oxygen 1-2L   Output: Voids spontaneously and adequately    Last BM: 1/29/2025   Activity: Ax1 using walker, refuses gait belt    Skin: Bruises and scabs to BUE, mepilex on R arm, mepilex on sacrum    Pain: Pain managed with scheduled meds     CMS: AO x4, numbness and tingling to BLE (baseline)    Dressing: CDI   Diet: Regular    LDA: R PIV SL   Equipment: Walker, IV pole, call light and personal belongings    Plan: Continue POC: Awaits placement in LTC with hospice vs. palliative care    Additional Info:  Pt refused continuous pulse ox

## 2025-02-01 ENCOUNTER — APPOINTMENT (OUTPATIENT)
Dept: GENERAL RADIOLOGY | Facility: CLINIC | Age: 80
DRG: 871 | End: 2025-02-01
Attending: INTERNAL MEDICINE
Payer: MEDICARE

## 2025-02-01 LAB
FLUAV RNA SPEC QL NAA+PROBE: NEGATIVE
FLUBV RNA RESP QL NAA+PROBE: NEGATIVE
HOLD SPECIMEN: NORMAL
POTASSIUM SERPL-SCNC: 3.4 MMOL/L (ref 3.4–5.3)
RSV RNA SPEC NAA+PROBE: NEGATIVE
SARS-COV-2 RNA RESP QL NAA+PROBE: NEGATIVE

## 2025-02-01 PROCEDURE — 36415 COLL VENOUS BLD VENIPUNCTURE: CPT | Performed by: INTERNAL MEDICINE

## 2025-02-01 PROCEDURE — 250N000013 HC RX MED GY IP 250 OP 250 PS 637: Performed by: INTERNAL MEDICINE

## 2025-02-01 PROCEDURE — 250N000013 HC RX MED GY IP 250 OP 250 PS 637

## 2025-02-01 PROCEDURE — 94640 AIRWAY INHALATION TREATMENT: CPT | Mod: 76

## 2025-02-01 PROCEDURE — 999N000157 HC STATISTIC RCP TIME EA 10 MIN

## 2025-02-01 PROCEDURE — 250N000009 HC RX 250

## 2025-02-01 PROCEDURE — 250N000012 HC RX MED GY IP 250 OP 636 PS 637: Performed by: INTERNAL MEDICINE

## 2025-02-01 PROCEDURE — 71046 X-RAY EXAM CHEST 2 VIEWS: CPT | Mod: 26 | Performed by: RADIOLOGY

## 2025-02-01 PROCEDURE — 250N000013 HC RX MED GY IP 250 OP 250 PS 637: Performed by: STUDENT IN AN ORGANIZED HEALTH CARE EDUCATION/TRAINING PROGRAM

## 2025-02-01 PROCEDURE — 84132 ASSAY OF SERUM POTASSIUM: CPT | Performed by: INTERNAL MEDICINE

## 2025-02-01 PROCEDURE — 94640 AIRWAY INHALATION TREATMENT: CPT

## 2025-02-01 PROCEDURE — 99232 SBSQ HOSP IP/OBS MODERATE 35: CPT | Performed by: INTERNAL MEDICINE

## 2025-02-01 PROCEDURE — 120N000002 HC R&B MED SURG/OB UMMC

## 2025-02-01 PROCEDURE — 87637 SARSCOV2&INF A&B&RSV AMP PRB: CPT | Performed by: INTERNAL MEDICINE

## 2025-02-01 PROCEDURE — 71046 X-RAY EXAM CHEST 2 VIEWS: CPT

## 2025-02-01 RX ORDER — POTASSIUM CHLORIDE 1500 MG/1
40 TABLET, EXTENDED RELEASE ORAL ONCE
Status: COMPLETED | OUTPATIENT
Start: 2025-02-01 | End: 2025-02-01

## 2025-02-01 RX ORDER — POTASSIUM CHLORIDE 1500 MG/1
20 TABLET, EXTENDED RELEASE ORAL 2 TIMES DAILY
Status: DISCONTINUED | OUTPATIENT
Start: 2025-02-01 | End: 2025-02-18 | Stop reason: HOSPADM

## 2025-02-01 RX ADMIN — IPRATROPIUM BROMIDE AND ALBUTEROL SULFATE 3 ML: .5; 3 SOLUTION RESPIRATORY (INHALATION) at 15:37

## 2025-02-01 RX ADMIN — MORPHINE SULFATE 5 MG: 20 SOLUTION ORAL at 12:06

## 2025-02-01 RX ADMIN — GABAPENTIN 400 MG: 300 CAPSULE ORAL at 14:37

## 2025-02-01 RX ADMIN — CARBIDOPA AND LEVODOPA 1 TABLET: 25; 100 TABLET ORAL at 14:37

## 2025-02-01 RX ADMIN — MORPHINE SULFATE 5 MG: 20 SOLUTION ORAL at 14:37

## 2025-02-01 RX ADMIN — CARBIDOPA AND LEVODOPA 1 TABLET: 25; 100 TABLET ORAL at 20:50

## 2025-02-01 RX ADMIN — GABAPENTIN 400 MG: 300 CAPSULE ORAL at 08:33

## 2025-02-01 RX ADMIN — IPRATROPIUM BROMIDE AND ALBUTEROL SULFATE 3 ML: .5; 3 SOLUTION RESPIRATORY (INHALATION) at 12:39

## 2025-02-01 RX ADMIN — IPRATROPIUM BROMIDE AND ALBUTEROL SULFATE 3 ML: .5; 3 SOLUTION RESPIRATORY (INHALATION) at 19:20

## 2025-02-01 RX ADMIN — POTASSIUM CHLORIDE 20 MEQ: 1500 TABLET, EXTENDED RELEASE ORAL at 08:33

## 2025-02-01 RX ADMIN — Medication 1 TABLET: at 08:33

## 2025-02-01 RX ADMIN — GABAPENTIN 400 MG: 300 CAPSULE ORAL at 20:50

## 2025-02-01 RX ADMIN — MORPHINE SULFATE 5 MG: 20 SOLUTION ORAL at 08:34

## 2025-02-01 RX ADMIN — MORPHINE SULFATE 5 MG: 20 SOLUTION ORAL at 18:04

## 2025-02-01 RX ADMIN — Medication 1 HALF-TAB: at 08:33

## 2025-02-01 RX ADMIN — ROSUVASTATIN 10 MG: 10 TABLET, FILM COATED ORAL at 08:33

## 2025-02-01 RX ADMIN — CARBIDOPA AND LEVODOPA 1 TABLET: 25; 100 TABLET ORAL at 08:33

## 2025-02-01 RX ADMIN — Medication 25 MCG: at 08:32

## 2025-02-01 RX ADMIN — AZITHROMYCIN DIHYDRATE 500 MG: 250 TABLET ORAL at 08:33

## 2025-02-01 RX ADMIN — LEVOTHYROXINE SODIUM 112 MCG: 112 TABLET ORAL at 08:34

## 2025-02-01 RX ADMIN — METOPROLOL SUCCINATE 50 MG: 50 TABLET, EXTENDED RELEASE ORAL at 08:34

## 2025-02-01 RX ADMIN — Medication 1 HALF-TAB: at 20:50

## 2025-02-01 RX ADMIN — Medication 1 HALF-TAB: at 14:37

## 2025-02-01 RX ADMIN — MORPHINE SULFATE 5 MG: 20 SOLUTION ORAL at 22:28

## 2025-02-01 RX ADMIN — PREDNISONE 40 MG: 20 TABLET ORAL at 08:33

## 2025-02-01 RX ADMIN — Medication 600 MG: at 08:33

## 2025-02-01 RX ADMIN — FUROSEMIDE 20 MG: 20 TABLET ORAL at 08:33

## 2025-02-01 RX ADMIN — POTASSIUM CHLORIDE 20 MEQ: 1500 TABLET, EXTENDED RELEASE ORAL at 20:50

## 2025-02-01 RX ADMIN — ASPIRIN 81 MG: 81 TABLET, COATED ORAL at 20:50

## 2025-02-01 RX ADMIN — IPRATROPIUM BROMIDE AND ALBUTEROL SULFATE 3 ML: .5; 3 SOLUTION RESPIRATORY (INHALATION) at 09:34

## 2025-02-01 RX ADMIN — FUROSEMIDE 20 MG: 20 TABLET ORAL at 16:25

## 2025-02-01 ASSESSMENT — ACTIVITIES OF DAILY LIVING (ADL)
ADLS_ACUITY_SCORE: 43

## 2025-02-01 NOTE — PROGRESS NOTES
"[facility]    Medicine Progress Note - Hospitalist Service, GOLD TEAM 17    Date of Admission:  1/4/2025    Assessment & Plan     Patient is a 80 y/o man who has a past medical history significant for thyroid cancer with lung metastases, Parkinson's disease, neuropathy, glaucoma, osteoporosis, chronic pain, severe COPD, hyperlipidemia, obstructive sleep apnea and coronary artery disease. Patient presented on 1/04/2025 with a 7 day history of productive cough and a 1 day history of weakness. CT pulmonary angiogram was negative for pulmonary embolism. Patient as admitted with  COPD exacerbation  and possible  community-acquired pneumonia as well.      Patient reportedly saw Pulmonology in Oct-2024 and COPD was moderately well-controlled at that time. Activity was apparently limited much more by his balance and parkinsonism than by respiratory status. Patient has antibiotics and prednisone available for outpatient COPD flares but apparently has not recently used this.      Patient apparently just entered hospice in the past week prior to hospital admission. Patient stated on admission that he was too weak and he and his daughter called their hospice agency and 911 was called and he was brought into the hospital. When provider asked on day of presentation whether patient wished for hospitalization and treatment of his potential infection, patient stated that he was seeking treatment and reported that his goal was to \"be able to do things for myself\". It was discussed that hospice typically focused on comfort rather than diagnostic testing and aggressive medical treatment. Patient acknowledged this but had seemed unsure of what his true desires for his care are moving forward.      Patient was on ceftriaxone from 1/04 - 1/08.  azithromycin 500 mg from 1/05-1/07-,  zosyn 1/09-1/ 14- now on every other day       Patient's overall prognosis is poor. Patient has been seen by " "palliative care      2/1  - bit more cough, he does not spit out sputum but swallows  - he sound bit wet today  - repeat CXR pending  - influenza RSV COVId swabs negative 2/1   - if gets worse or not better consider pulmonary to see on Monday         Pneumonia  Sepsis   - completed antibiotics  - on admission felt to have sepsis   - Patient initially was treated for a possible community-acquired pneumonia on admission with ceftriaxone and azithromycin.  - Patient subsequently was treated for possible hospital-acquired pneumonia with zosyn.     COPD exacerbation  Emphysema   Chronic hypoxemic respiratory failure  - shortness of breath  with minimal exertion  - sees pulmonary as out patient , has not been interested in pulmonary rehab   - recent prolonged hospitalization with COPD exacerbation    -PFTs of 3/24/2023 showing FEV1/FVC of 1.3/2.4 (46/65% predicted, respectively) for ratio 54%. TLC is reduced at 5.1 (74% predicted) and DLCO uncorrected is very low at 10.1 (41% predicted).   - was on prednisone 40 mg daily from 1/04/2025 to 1/08/2025. Patient received prednisone 20 mg once on 1/09- then  methylprednisolone 40 mg IV every 8 hours from 1/09- 1/13 and is no longer on steroids  - 1/28  started prednisone 40 mg x 5 days then 2 mg daily as he had been on this  those for a while prior and suspect he is steroid dependent , state she was taking the prednisone 2 mg pre admission   - uses Trelegy and getting it here   - on O2 1-2 litres at baseline.  - CT pulmonary angio 1/4 had no pulmonary embolism  . \"Waxing and waning pulmonary nodularity with a new 8 mm nodular focus in the left lower lobe. Findings are likely secondary to a chronic infectious/inflammatory process including aspiration. Consider short-term follow-up CT in one month for reassessment. 3. Additional sub-6 mm pulmonary nodules are stable compared to prior. 4. Moderate to severe emphysematous changes of the lung parenchyma with chronic fibrotic changes " "along the right minor fissure.\"  - Patient patient's daughter, patient may be placed on BiPAP if needed.  - suspect has element of pulmonary hypertension , on lasix, did increase to bid 1/30       Coronary artery disease with past MI s/p stent Cx  1997  placement  HDL   - at time of initiall stent placement he was noted to have 80-90% lesion in nondominant RCA  and 50 % LAD that was treated medically   - Patient usually on aspirin and crestor , restarted aspirin   - Patient continuing metoprolol succinate 50 mg daily.       Hypertension  - continue PTA metoprolol succinate.  - continue lasix , increased to bid as above 1/30 , also increased Kcl     History of traumatic  subdural hemorrhage in 2023  - since resolved per NS follow up      Parkinson's disease  - recently diagnosed   -Has seen neurology and on sinemet and continue     Frequent falls  - with left femur fracture and inf pubic rami fracture, also history rib fracture        Metastatic thyroid cancer (multifocal papillary carcinoma)   Status post  thyroidectomy 2021   also received radioactive iodine   - had lymph node metastases,.  - Further surveillance as outpatient.  - continue levothyroxine         Right upper lobe lung mass,  s/p radiation therapy with possible radiation pneumonitis:  -  had previous VATS biopsy in Feb-2022 that was non-diagnostic.     New lung nodule in left lower lobe  - follow up  pulmonary         Thrombocytopenia  -  last plt count was 72K 1/14, then went to  54K ---59K ---85K, was low on admission  - he denies alcohol consumption  - cont to monitor   - restarted ASA    - peripheral smear nonrevealing        Rheumatoid arthritis with positive rheumatoid factor:  - Per chart review, patient had once been on methotrexate and prednisone but had stopped these medications a few years ago.  - Patient to f/u as outpatient if desired.     Osteopenia   -had been on calcium and vit D       History of immunodeficency  - IG subclass 2        " Hypernatremia  - improved:        Neuropathy   chronic pain syndrome:  - Patient currently on gabapentin 400 mg three times a day.  --his  PTA robaxin 500mg TID has been on hold   -Hold PTA trazadone 50mg tab QHS  -Continue PTA oxycodone 5mg Q6H for pain       Glaucoma:  Dry eyes    - Patient not currently on medication for this.  - Patient to f/u with Ophthalmology as outpatient if further treatment is desired.       osteoporosis  h/o right femoral neck fracture s/p right total hip replacement   -hold PTA denosumab 60mg injection every 6 months (last 10/25/2024)  -restarted  PTA calcium , vitamin D  - prolia q 6 mo          Moderate malnutrition in the context of acute vs. chronic illness:  - Supplementing as able.       Patient  was ok for me to call his daughter Janey , I tried , there was no answer   He was seen by palliative cares , possible discharge  to Hospice, when I talked to py 1/28 he stated that t he wanted to get better         Diet: Snacks/Supplements Adult: Ensure Enlive; With Meals  Regular Diet Adult Thin Liquids (level 0)    DVT Prophylaxis: consider subcutaneous heparin   Mir Catheter: Not present  Lines: None     Cardiac Monitoring: None  Code Status: No CPR- Do NOT Intubate      Clinically Significant Risk Factors                 # Thrombocytopenia: Lowest platelets = 85 in last 2 days, will monitor for bleeding   # Hypertension: Noted on problem list     # Acute Hypoxic Respiratory Failure: Documented O2 saturation < 90%. Continue supplemental oxygen as needed          # Moderate Malnutrition: based on nutrition assessment    # Financial/Environmental Concerns:           Social Drivers of Health    Tobacco Use: Medium Risk (10/18/2024)    Patient History     Smoking Tobacco Use: Former     Smokeless Tobacco Use: Never   Physical Activity: Inactive (10/14/2024)    Exercise Vital Sign     Days of Exercise per Week: 0 days     Minutes of Exercise per Session: 0 min   Stress: Stress Concern  Present (10/14/2024)    [employer] of [employer]ational Health - Occupational Stress Questionnaire     Feeling of Stress : Very much   Social Connections: Unknown (10/14/2024)    Social Connection and Isolation Panel [NHANES]     Frequency of Social Gatherings with Friends and Family: More than three times a week          Disposition Plan     Medically Ready for Discharge: Anticipated in 5+ Days once bed is available              Opal Bowman MD  Hospitalist Service, GOLD TEAM 17  [facility]  Securely message with [employer] (more info)  Text page via [facility] Paging/Directory   See signed in provider for up to date coverage information  ______________________________________________________________________    Interval History   In bed, he does seem short of breath   Physical Exam   Vital Signs: Temp: 97.5  F (36.4  C) Temp src: Oral BP: 130/74 Pulse: 68   Resp: 18 SpO2: 100 % O2 Device: Nasal cannula Oxygen Delivery: 2 LPM  Weight: 157 lbs 1.6 oz  General appearence: awake alert  , some shortness of breath  but in  no apparent distress    RESPIRATORY: lungs  scattered exp wheezing     CARDIOVASCULAR:S1 S2 regular rate and rhythm,  GASTROINTESTINAL:soft, non-distended , non-tender , + bowel sounds,    SKIN: warm and dry, no mottling noted   NEUROLOGIC; awake alert and oriented,   EXTREMITIES: no clubbing, cyanosis or edema       Data     I have personally reviewed the following data over the past 24 hrs:    N/A  \   N/A   / N/A     N/A N/A N/A /  N/A   3.4 N/A N/A \       Imaging results reviewed over the past 24 hrs:   No results found for this or any previous visit (from the past 24 hours).

## 2025-02-01 NOTE — PLAN OF CARE
Goal Outcome Evaluation:      Plan of Care Reviewed With: patient    Overall Patient Progress: no changeOverall Patient Progress: no change    Outcome Evaluation: No change in pt progress this shift    Neuro/Orientation: A&Ox4, able to make needs known, baseline N/T.   Respiratory: On 2L via NC, refusing pulse ox, dyspnea on exertion and with activities. Denied chest pain  Activity/Transfer: SBA with FWW, refusing gate belts. Can reposition self in bed.   Diet: Reg/thin-pills whole one at a time. Fair appetite, denied N/V.   GI/: Continent x2-BM this shift, up to toilet.   Skin: scattered bruising and scabs, skin tear on right forearm, redness to coccyx area, mepilex applied.   Pain: lower back pain managed with scheduled morphine rating it a 5-6/10.  LDA: Right PIV-SL and nasal cannula.  Plan: waiting for placement. Continue with POC.   Additional Notes: chest xray done this afternoon, respiratory panel sent down, result came back negative for covid, flu, and RSV.. Potassium protocol discontinued    Patient most recent vitals:  /74   Pulse 68   Temp 97.5  F (36.4  C) (Oral)   Resp 18   Wt 71.3 kg (157 lb 1.6 oz)   SpO2 100%   BMI 23.54 kg/m

## 2025-02-01 NOTE — PLAN OF CARE
Goal Outcome Evaluation:      VS: Temp: 98.6  F (37  C) Temp src: Oral BP: 123/73 Pulse: 89   Resp: 16 SpO2: 97 % O2 Device: Nasal cannula Oxygen Delivery: 2 LPM     O2: 2L NC. Reports SOB after activity.   Output: Continent x2. Ambulates to bathroom.   Last BM: 1/29     Activity: Assist of 1 with walker. Refused gait belt.   Skin: BUE bruising & scabs  Mepelex R arm d/t skin breakdown  Mepelex sacrum    Pain: Managed with scheduled morphine   CMS: A&Ox4. Numbness & tingling BLE.    Dressing: CDI   Diet: Regular diet. Pills whole. 1 pill at a time.    LDA: R PIV SL   Equipment: Walker, personal belongings   Plan: Awaiting placement   Additional Info: Refused cont pulse ox

## 2025-02-02 PROCEDURE — 250N000011 HC RX IP 250 OP 636: Performed by: INTERNAL MEDICINE

## 2025-02-02 PROCEDURE — 250N000013 HC RX MED GY IP 250 OP 250 PS 637: Performed by: INTERNAL MEDICINE

## 2025-02-02 PROCEDURE — 250N000013 HC RX MED GY IP 250 OP 250 PS 637

## 2025-02-02 PROCEDURE — 999N000157 HC STATISTIC RCP TIME EA 10 MIN

## 2025-02-02 PROCEDURE — 94640 AIRWAY INHALATION TREATMENT: CPT | Mod: 76

## 2025-02-02 PROCEDURE — 94640 AIRWAY INHALATION TREATMENT: CPT

## 2025-02-02 PROCEDURE — 250N000012 HC RX MED GY IP 250 OP 636 PS 637: Performed by: INTERNAL MEDICINE

## 2025-02-02 PROCEDURE — 250N000013 HC RX MED GY IP 250 OP 250 PS 637: Performed by: STUDENT IN AN ORGANIZED HEALTH CARE EDUCATION/TRAINING PROGRAM

## 2025-02-02 PROCEDURE — 99233 SBSQ HOSP IP/OBS HIGH 50: CPT | Performed by: INTERNAL MEDICINE

## 2025-02-02 PROCEDURE — 120N000002 HC R&B MED SURG/OB UMMC

## 2025-02-02 PROCEDURE — 250N000009 HC RX 250

## 2025-02-02 RX ORDER — FUROSEMIDE 10 MG/ML
20 INJECTION INTRAMUSCULAR; INTRAVENOUS ONCE
Status: COMPLETED | OUTPATIENT
Start: 2025-02-02 | End: 2025-02-02

## 2025-02-02 RX ADMIN — FUROSEMIDE 20 MG: 20 TABLET ORAL at 16:17

## 2025-02-02 RX ADMIN — CARBIDOPA AND LEVODOPA 1 TABLET: 25; 100 TABLET ORAL at 08:28

## 2025-02-02 RX ADMIN — LEVOTHYROXINE SODIUM 112 MCG: 112 TABLET ORAL at 08:28

## 2025-02-02 RX ADMIN — GABAPENTIN 400 MG: 300 CAPSULE ORAL at 08:28

## 2025-02-02 RX ADMIN — POTASSIUM CHLORIDE 20 MEQ: 1500 TABLET, EXTENDED RELEASE ORAL at 08:28

## 2025-02-02 RX ADMIN — MORPHINE SULFATE 5 MG: 20 SOLUTION ORAL at 17:52

## 2025-02-02 RX ADMIN — PREDNISONE 2 MG: 1 TABLET ORAL at 08:29

## 2025-02-02 RX ADMIN — Medication 25 MCG: at 08:29

## 2025-02-02 RX ADMIN — FUROSEMIDE 20 MG: 10 INJECTION, SOLUTION INTRAMUSCULAR; INTRAVENOUS at 12:34

## 2025-02-02 RX ADMIN — Medication 600 MG: at 08:29

## 2025-02-02 RX ADMIN — Medication 1 HALF-TAB: at 08:28

## 2025-02-02 RX ADMIN — Medication 1 TABLET: at 08:29

## 2025-02-02 RX ADMIN — GABAPENTIN 400 MG: 300 CAPSULE ORAL at 14:06

## 2025-02-02 RX ADMIN — GABAPENTIN 400 MG: 300 CAPSULE ORAL at 20:10

## 2025-02-02 RX ADMIN — IPRATROPIUM BROMIDE AND ALBUTEROL SULFATE 3 ML: .5; 3 SOLUTION RESPIRATORY (INHALATION) at 12:28

## 2025-02-02 RX ADMIN — IPRATROPIUM BROMIDE AND ALBUTEROL SULFATE 3 ML: .5; 3 SOLUTION RESPIRATORY (INHALATION) at 08:31

## 2025-02-02 RX ADMIN — POTASSIUM CHLORIDE 20 MEQ: 1500 TABLET, EXTENDED RELEASE ORAL at 20:10

## 2025-02-02 RX ADMIN — MORPHINE SULFATE 5 MG: 20 SOLUTION ORAL at 08:27

## 2025-02-02 RX ADMIN — MORPHINE SULFATE 5 MG: 20 SOLUTION ORAL at 11:29

## 2025-02-02 RX ADMIN — METOPROLOL SUCCINATE 50 MG: 50 TABLET, EXTENDED RELEASE ORAL at 08:28

## 2025-02-02 RX ADMIN — ASPIRIN 81 MG: 81 TABLET, COATED ORAL at 08:28

## 2025-02-02 RX ADMIN — CARBIDOPA AND LEVODOPA 1 TABLET: 25; 100 TABLET ORAL at 14:06

## 2025-02-02 RX ADMIN — ASPIRIN 81 MG: 81 TABLET, COATED ORAL at 20:10

## 2025-02-02 RX ADMIN — Medication 1 HALF-TAB: at 14:06

## 2025-02-02 RX ADMIN — IPRATROPIUM BROMIDE AND ALBUTEROL SULFATE 3 ML: .5; 3 SOLUTION RESPIRATORY (INHALATION) at 17:04

## 2025-02-02 RX ADMIN — MORPHINE SULFATE 5 MG: 20 SOLUTION ORAL at 22:11

## 2025-02-02 RX ADMIN — ROSUVASTATIN 10 MG: 10 TABLET, FILM COATED ORAL at 08:29

## 2025-02-02 RX ADMIN — Medication 1 HALF-TAB: at 20:09

## 2025-02-02 RX ADMIN — FUROSEMIDE 20 MG: 20 TABLET ORAL at 08:28

## 2025-02-02 RX ADMIN — IPRATROPIUM BROMIDE AND ALBUTEROL SULFATE 3 ML: .5; 3 SOLUTION RESPIRATORY (INHALATION) at 20:44

## 2025-02-02 RX ADMIN — CARBIDOPA AND LEVODOPA 1 TABLET: 25; 100 TABLET ORAL at 20:09

## 2025-02-02 RX ADMIN — MORPHINE SULFATE 5 MG: 20 SOLUTION ORAL at 14:06

## 2025-02-02 ASSESSMENT — ACTIVITIES OF DAILY LIVING (ADL)
ADLS_ACUITY_SCORE: 42
ADLS_ACUITY_SCORE: 42
ADLS_ACUITY_SCORE: 43
ADLS_ACUITY_SCORE: 42
ADLS_ACUITY_SCORE: 42
ADLS_ACUITY_SCORE: 43
ADLS_ACUITY_SCORE: 42
ADLS_ACUITY_SCORE: 43
ADLS_ACUITY_SCORE: 43
ADLS_ACUITY_SCORE: 42
ADLS_ACUITY_SCORE: 43
ADLS_ACUITY_SCORE: 43
ADLS_ACUITY_SCORE: 42
ADLS_ACUITY_SCORE: 43
ADLS_ACUITY_SCORE: 42

## 2025-02-02 NOTE — PROGRESS NOTES
"[facility]    Medicine Progress Note - Hospitalist Service, GOLD TEAM 17    Date of Admission:  1/4/2025    Assessment & Plan     Patient is a 80 y/o man who has a past medical history significant for thyroid cancer with lung metastases, Parkinson's disease, neuropathy, glaucoma, osteoporosis, chronic pain, severe COPD, hyperlipidemia, obstructive sleep apnea and coronary artery disease. Patient presented on 1/04/2025 with a 7 day history of productive cough and a 1 day history of weakness. CT pulmonary angiogram was negative for pulmonary embolism. Patient as admitted with  COPD exacerbation  and possible  community-acquired pneumonia as well.      Patient reportedly saw Pulmonology in Oct-2024 and COPD was moderately well-controlled at that time. Activity was apparently limited much more by his balance and parkinsonism than by respiratory status. Patient has antibiotics and prednisone available for outpatient COPD flares but apparently has not recently used this.      Patient apparently just entered hospice in the past week prior to hospital admission. Patient stated on admission that he was too weak and he and his daughter called their hospice agency and 911 was called and he was brought into the hospital. When provider asked on day of presentation whether patient wished for hospitalization and treatment of his potential infection, patient stated that he was seeking treatment and reported that his goal was to \"be able to do things for myself\". It was discussed that hospice typically focused on comfort rather than diagnostic testing and aggressive medical treatment. Patient acknowledged this but had seemed unsure of what his true desires for his care are moving forward.      Patient was on ceftriaxone from 1/04 - 1/08.  azithromycin 500 mg from 1/05-1/07-,  zosyn 1/09-1/ 14- now on every other day       Patient's overall prognosis is poor. Patient has been seen by " "palliative care      2/2  - 2/1  CXR showed improved left base atx/consolidation , no new opacities    - influenza RSV COVId swabs negative 2/1   - giving  lasix iv x 1 , continue oral bid   - getting steroid burs and increased diuresis  without any improvement , will ask pulmonary to see on Monday and see if we can do any more med adjustments    - he is not sure if he wants  hospice on discharge and he also had not given any definite answers to his  daughter , will ask palliative care to also resee patient reg goals of cares/symtpom management          Pneumonia  Sepsis   - completed antibiotics  - on admission felt to have sepsis   - Patient initially was treated for a possible community-acquired pneumonia on admission with ceftriaxone and azithromycin.  - Patient subsequently was treated for possible hospital-acquired pneumonia with zosyn.     COPD exacerbation  Emphysema   Chronic hypoxemic respiratory failure  - shortness of breath  with minimal exertion  - sees pulmonary as out patient , has not been interested in pulmonary rehab   - recent prolonged hospitalization with COPD exacerbation    -PFTs of 3/24/2023 showing FEV1/FVC of 1.3/2.4 (46/65% predicted, respectively) for ratio 54%. TLC is reduced at 5.1 (74% predicted) and DLCO uncorrected is very low at 10.1 (41% predicted).   - was on prednisone 40 mg daily from 1/04/2025 to 1/08/2025. Patient received prednisone 20 mg once on 1/09- then  methylprednisolone 40 mg IV every 8 hours from 1/09- 1/13 and is no longer on steroids  - 1/28  started prednisone 40 mg x 5 days then 2 mg daily as he had been on this  those for a while prior and suspect he is steroid dependent , state she was taking the prednisone 2 mg pre admission   - uses Trelegy and getting it here   - on O2 1-2 litres at baseline.  - CT pulmonary angio 1/4 had no pulmonary embolism  . \"Waxing and waning pulmonary nodularity with a new 8 mm nodular focus in the left lower lobe. Findings are likely " "secondary to a chronic infectious/inflammatory process including aspiration. Consider short-term follow-up CT in one month for reassessment. 3. Additional sub-6 mm pulmonary nodules are stable compared to prior. 4. Moderate to severe emphysematous changes of the lung parenchyma with chronic fibrotic changes along the right minor fissure.\"  - Patient patient's daughter, patient may be placed on BiPAP if needed and per daughter he had been on BIPAP intermittently that helped with his breathing   - suspect has element of pulmonary hypertension , on lasix, did increase to bid 1/30       Coronary artery disease with past MI s/p stent Cx  1997  placement  HDL   - at time of initiall stent placement he was noted to have 80-90% lesion in nondominant RCA  and 50 % LAD that was treated medically   - Patient usually on aspirin and crestor , restarted aspirin   - Patient continuing metoprolol succinate 50 mg daily.       Hypertension  - continue PTA metoprolol succinate.  - continue lasix , increased to bid as above 1/30 , also increased Kcl     History of traumatic  subdural hemorrhage in 2023  - since resolved per NS follow up      Parkinson's disease  - recently diagnosed   -Has seen neurology and on sinemet and continue     Frequent falls  - with left femur fracture and inf pubic rami fracture, also history rib fracture        Metastatic thyroid cancer (multifocal papillary carcinoma)   Status post  thyroidectomy 2021   also received radioactive iodine   - had lymph node metastases,.  - Further surveillance as outpatient.  - continue levothyroxine         Right upper lobe lung mass,  s/p radiation therapy with possible radiation pneumonitis:  -  had previous VATS biopsy in Feb-2022 that was non-diagnostic.     New lung nodule in left lower lobe  - follow up  pulmonary         Thrombocytopenia  -  last plt count was 72K 1/14, then went to  54K ---59K ---85K, was low on admission  - he denies alcohol consumption  - cont to " monitor   - restarted ASA    - peripheral smear nonrevealing        Rheumatoid arthritis with positive rheumatoid factor:  - Per chart review, patient had once been on methotrexate and prednisone but had stopped these medications a few years ago.  - Patient to f/u as outpatient if desired.     Osteopenia   -had been on calcium and vit D       History of immunodeficency  - IG subclass 2      Hypernatremia  - improved:      Neuropathy   chronic pain syndrome:  - Patient currently on gabapentin 400 mg three times a day.  --his  PTA robaxin 500mg TID has been on hold   -Hold PTA trazadone 50mg tab QHS  -Continue PTA oxycodone 5mg Q6H for pain      Glaucoma:  Dry eyes    - Patient not currently on medication for this.  - Patient to f/u with Ophthalmology as outpatient if further treatment is desired.       osteoporosis  h/o right femoral neck fracture s/p right total hip replacement   -hold PTA denosumab 60mg injection every 6 months (last 10/25/2024)  -restarted  PTA calcium , vitamin D  - prolia q 6 mo          Moderate malnutrition in the context of acute vs. chronic illness:  - Supplementing as able.       Patient  was ok for me to call his daughter Janey , I tried , there was no answer   He was seen by palliative cares , possible discharge  to Hospice, when I talked to py 1/28 he stated that t he wanted to get better   2/2 called daughter Janey and discussed above         Diet: Snacks/Supplements Adult: Ensure Enlive; With Meals  Regular Diet Adult Thin Liquids (level 0)    DVT Prophylaxis: consider subcutaneous heparin   Mir Catheter: Not present  Lines: None     Cardiac Monitoring: None  Code Status: No CPR- Do NOT Intubate      Clinically Significant Risk Factors                   # Hypertension: Noted on problem list     # Acute Hypoxic Respiratory Failure: Documented O2 saturation < 90%. Continue supplemental oxygen as needed          # Moderate Malnutrition: based on nutrition assessment    #  Financial/Environmental Concerns:           Social Drivers of Health    Tobacco Use: Medium Risk (10/18/2024)    Patient History     Smoking Tobacco Use: Former     Smokeless Tobacco Use: Never   Physical Activity: Inactive (10/14/2024)    Exercise Vital Sign     Days of Exercise per Week: 0 days     Minutes of Exercise per Session: 0 min   Stress: Stress Concern Present (10/14/2024)    [employer] - Occupational Stress Questionnaire     Feeling of Stress : Very much   Social Connections: Unknown (10/14/2024)    Social Connection and Isolation Panel [NHANES]     Frequency of Social Gatherings with Friends and Family: More than three times a week          Disposition Plan     Medically Ready for Discharge: Anticipated in 5+ Days once bed is available              Opal Bowman MD  Hospitalist Service, GOLD TEAM [address] [place] [address]  Securely message with [place] (more info)  Text page via [facility] Paging/Directory   See signed in provider for up to date coverage information  ______________________________________________________________________    Interval History   Still tachypnoic, no great improvement in breathing, still gets short of bretah just by talking   Physical Exam   Vital Signs: Temp: 97.6  F (36.4  C) Temp src: Oral BP: 127/67 Pulse: 71   Resp: 16 SpO2: 96 % O2 Device: Nasal cannula Oxygen Delivery: 2 LPM  Weight: 156 lbs 8 oz  General appearence: awake alert  , some shortness of breath  but in  no apparent distress    RESPIRATORY: lungs  no wheezing today      CARDIOVASCULAR:S1 S2 regular rate and rhythm,  GASTROINTESTINAL:soft, non-distended , non-tender , + bowel sounds,    SKIN: warm and dry, no mottling noted   NEUROLOGIC; awake alert and oriented,   EXTREMITIES: no clubbing, cyanosis or edema       Data         Imaging results reviewed over the past 24 hrs:   Recent Results (from the past 24 hours)   XR Chest 2 Views    Narrative     EXAM: XR CHEST 2 VIEWS 2/1/2025 2:13 PM    DEMOGRAPHICS: 79 years Male    INDICATION: COPD worsening SOB    COMPARISON: None    TECHNIQUE: Single portable AP view of the chest.    FINDINGS:   Right middle lobe linear/bandlike atelectasis unchanged from prior  study. Streaky airspace opacities within the left lower lung field.    Trachea is midline. Normal cardiac silhouette. Mediastinum is within  normal limits. Distinct pulmonary vasculature. No significant pleural  effusion. No discernable pneumothorax.. Unremarkable upper abdomen. No  acute or suspicious osseous abnormalities. Unremarkable soft tissues.  Atherosclerosis of the aortic arch.      Impression    IMPRESSION: Improved left base atelectasis/consolidation. No new  opacities.    I have personally reviewed the examination and initial interpretation  and I agree with the findings.    SHAN ROSALES MD         SYSTEM ID:  [id number]

## 2025-02-02 NOTE — PROGRESS NOTES
"Care Management Follow Up    Length of Stay (days): 29    Expected Discharge Date: 02/03/2025     Concerns to be Addressed: discharge planning     Patient plan of care discussed at interdisciplinary rounds: Yes    Anticipated Discharge Disposition: Home, Hospice      Anticipated Discharge Services: Other (see comment) (current on home hospice)  Anticipated Discharge DME: None    Patient/family educated on Medicare website which has current facility and service quality ratings:    Education Provided on the Discharge Plan: Yes  Patient/Family in Agreement with the Plan:      Referrals Placed by CM/SW: Homecare (currently on home hospice)  Private pay costs discussed: Not applicable    Discussed  Partnership in Safe Discharge Planning  document with patient/family: No     Handoff Completed: No, handoff not indicated or clinically appropriate    Additional Information:  Chart reviewed with weekend follow up note to gather LTC and hospice choices from pt and submit referrals.     SW met with pt at bedside and introduced self. SW asked if pt has picked out LTC and hospice facility for SW to submit referrals. Pt stated \"I just provided the lists to my daughter yesterday when she came to visit. She has been sick and has not been able to get out of bed to come see me.\" SW explained to pt the importance to get referrals going therefore it will not prolong discharge plans. SW asked pt if it was ok to call his daughter to follow up on LTC and hospice options. Pt express understanding and asked to give his daughter time to review the list because she is sick. SW reiterate the importance to actively move forward with choices for a smooth transition for discharge. SW shared availability and excused self from pt's room.       Next Steps: Follow up with pt and daughter to get LTC/hospice choices and submit referrals.   _______________________________    JUANA Vasquez    [facility], located within [facility]  Social Work and Care " Management Department     SEARCHABLE in [facility] - search SOCIAL WORK     [place] (0800 - 1630) Saturday and Sunday   Units: 4A Vocera, 4C Vocera, & 4E Vocera      Units: 5A [id number] Vocera, 5A 1118-0496 Vocera , BMT SW 1 BMT SW 2, BMT SW 3 & BMT SW 4  5C Off Service 5401 - 5416  5C Off Service [id number]   Units: 6A Vocera & 6B Vocera    Units: 6C Vocera   Units: 7A Vocera & 7B Vocera    Units: 7C Med Surg 7401 thru 7418 and 7C Med Surg 7502 thru 7521    Unit: [place] ED Vocera & [place] Obs Vocera   [facility] ([id number]) Saturday and Sunday    Units: 5 Ortho Vocera, 5 Med Surg Vocera & WB ED Vocera   Units: 6 Med Surg Vocera, 8 Med Surg Vocera, & 10 ICU Vocera   After hours Vocera [facility] and After Hours Vocera [place]   Mon-Fri ([id number]) Saturday & Sunday (1630 - 2030)    [facility] Recognized Holidays  ([id number])

## 2025-02-02 NOTE — PLAN OF CARE
Goal Outcome Evaluation:    Plan of Care Reviewed With: patient    Overall Patient Progress: no change    A/O x4    2 L O2 via NC, refusing continuous pulse ox    SBA w/ walker, pt refuses GB    Regular diet/thin liquids/medications whole (large pills separate)    Continent B/B - LBM 2/1    R PIV SL    No acute events this shift    Call light within reach, will continue to monitor and follow POC

## 2025-02-02 NOTE — PLAN OF CARE
Goal Outcome Evaluation: Ongoing       Plan of Care Reviewed With: patient    Overall Patient Progress: no changeOverall Patient Progress: no change      Plan of Care Reviewed With: patient     Overall Patient Progress: no changeOverall Patient Progress: no change     Outcome Evaluation: No change in pt progress this shift     Neuro/Orientation: A&Ox4, able to make needs known, baseline N/T.   Respiratory: On 2L via NC, refusing pulse ox, dyspnea on exertion and with activities. Denied chest pain. Extra dose of lasix iv given this afternoon along with mornings usual dosage.   Activity/Transfer: SBA with FWW, refusing gate belts. Can reposition self in bed.   Diet: Reg/thin-pills whole one at a time. Fair appetite, denied N/V.   GI/: Continent of bowel and bladder . LBM 2/1/25   Skin: scattered bruising and scabs, skin tear on right forearm, redness to coccyx area, mepilex applied.   Pain: lower back pain managed with scheduled morphine rating it a 5-6/10.  LDA: Right PIV-SL and nasal cannula.  Plan: waiting for placement. Continue with POC  Patient most recent vitals:  /67   Pulse 71  Temp 97.6 F (Oral)   Resp 16   Wt 71.3 kg (157 lb 1.6 oz)   SpO2 96% 2 liters nasal cannula.    BMI 23.54 kg/m

## 2025-02-03 ENCOUNTER — DOCUMENTATION ONLY (OUTPATIENT)
Facility: CLINIC | Age: 80
End: 2025-02-03
Payer: MEDICARE

## 2025-02-03 LAB
ANION GAP SERPL CALCULATED.3IONS-SCNC: 10 MMOL/L (ref 7–15)
BUN SERPL-MCNC: 18.2 MG/DL (ref 8–23)
CALCIUM SERPL-MCNC: 9.1 MG/DL (ref 8.8–10.4)
CHLORIDE SERPL-SCNC: 103 MMOL/L (ref 98–107)
CREAT SERPL-MCNC: 0.87 MG/DL (ref 0.67–1.17)
EGFRCR SERPLBLD CKD-EPI 2021: 89 ML/MIN/1.73M2
GLUCOSE SERPL-MCNC: 77 MG/DL (ref 70–99)
HCO3 SERPL-SCNC: 33 MMOL/L (ref 22–29)
POTASSIUM SERPL-SCNC: 4.5 MMOL/L (ref 3.4–5.3)
SODIUM SERPL-SCNC: 146 MMOL/L (ref 135–145)

## 2025-02-03 PROCEDURE — 99223 1ST HOSP IP/OBS HIGH 75: CPT | Mod: GC | Performed by: INTERNAL MEDICINE

## 2025-02-03 PROCEDURE — 94640 AIRWAY INHALATION TREATMENT: CPT | Mod: 76

## 2025-02-03 PROCEDURE — 250N000013 HC RX MED GY IP 250 OP 250 PS 637: Performed by: PHYSICIAN ASSISTANT

## 2025-02-03 PROCEDURE — 99233 SBSQ HOSP IP/OBS HIGH 50: CPT | Performed by: INTERNAL MEDICINE

## 2025-02-03 PROCEDURE — 120N000002 HC R&B MED SURG/OB UMMC

## 2025-02-03 PROCEDURE — 250N000013 HC RX MED GY IP 250 OP 250 PS 637: Performed by: INTERNAL MEDICINE

## 2025-02-03 PROCEDURE — 250N000012 HC RX MED GY IP 250 OP 636 PS 637: Performed by: INTERNAL MEDICINE

## 2025-02-03 PROCEDURE — 82310 ASSAY OF CALCIUM: CPT | Performed by: INTERNAL MEDICINE

## 2025-02-03 PROCEDURE — 250N000009 HC RX 250

## 2025-02-03 PROCEDURE — 80048 BASIC METABOLIC PNL TOTAL CA: CPT | Performed by: INTERNAL MEDICINE

## 2025-02-03 PROCEDURE — 999N000157 HC STATISTIC RCP TIME EA 10 MIN

## 2025-02-03 PROCEDURE — 250N000013 HC RX MED GY IP 250 OP 250 PS 637: Performed by: STUDENT IN AN ORGANIZED HEALTH CARE EDUCATION/TRAINING PROGRAM

## 2025-02-03 PROCEDURE — 94640 AIRWAY INHALATION TREATMENT: CPT

## 2025-02-03 PROCEDURE — 250N000013 HC RX MED GY IP 250 OP 250 PS 637

## 2025-02-03 PROCEDURE — 36415 COLL VENOUS BLD VENIPUNCTURE: CPT | Performed by: INTERNAL MEDICINE

## 2025-02-03 RX ADMIN — ASPIRIN 81 MG: 81 TABLET, COATED ORAL at 21:15

## 2025-02-03 RX ADMIN — METOPROLOL SUCCINATE 50 MG: 50 TABLET, EXTENDED RELEASE ORAL at 08:07

## 2025-02-03 RX ADMIN — Medication 600 MG: at 08:07

## 2025-02-03 RX ADMIN — IPRATROPIUM BROMIDE AND ALBUTEROL SULFATE 3 ML: .5; 3 SOLUTION RESPIRATORY (INHALATION) at 16:50

## 2025-02-03 RX ADMIN — MORPHINE SULFATE 5 MG: 20 SOLUTION ORAL at 14:10

## 2025-02-03 RX ADMIN — MORPHINE SULFATE 5 MG: 20 SOLUTION ORAL at 11:12

## 2025-02-03 RX ADMIN — GABAPENTIN 400 MG: 300 CAPSULE ORAL at 21:15

## 2025-02-03 RX ADMIN — MORPHINE SULFATE 5 MG: 20 SOLUTION ORAL at 08:03

## 2025-02-03 RX ADMIN — MORPHINE SULFATE 5 MG: 20 SOLUTION ORAL at 18:07

## 2025-02-03 RX ADMIN — IPRATROPIUM BROMIDE AND ALBUTEROL SULFATE 3 ML: .5; 3 SOLUTION RESPIRATORY (INHALATION) at 08:30

## 2025-02-03 RX ADMIN — MORPHINE SULFATE 5 MG: 20 SOLUTION ORAL at 21:15

## 2025-02-03 RX ADMIN — PREDNISONE 2 MG: 1 TABLET ORAL at 08:05

## 2025-02-03 RX ADMIN — Medication 1 TABLET: at 08:07

## 2025-02-03 RX ADMIN — GABAPENTIN 400 MG: 300 CAPSULE ORAL at 08:07

## 2025-02-03 RX ADMIN — GABAPENTIN 400 MG: 300 CAPSULE ORAL at 14:10

## 2025-02-03 RX ADMIN — ROSUVASTATIN 10 MG: 10 TABLET, FILM COATED ORAL at 08:06

## 2025-02-03 RX ADMIN — POTASSIUM CHLORIDE 20 MEQ: 1500 TABLET, EXTENDED RELEASE ORAL at 21:15

## 2025-02-03 RX ADMIN — Medication 0.25 MG: at 14:17

## 2025-02-03 RX ADMIN — IPRATROPIUM BROMIDE AND ALBUTEROL SULFATE 3 ML: .5; 3 SOLUTION RESPIRATORY (INHALATION) at 13:01

## 2025-02-03 RX ADMIN — FUROSEMIDE 20 MG: 20 TABLET ORAL at 08:07

## 2025-02-03 RX ADMIN — Medication 1 HALF-TAB: at 21:15

## 2025-02-03 RX ADMIN — Medication 25 MCG: at 08:07

## 2025-02-03 RX ADMIN — CARBIDOPA AND LEVODOPA 1 TABLET: 25; 100 TABLET ORAL at 08:04

## 2025-02-03 RX ADMIN — IPRATROPIUM BROMIDE AND ALBUTEROL SULFATE 3 ML: .5; 3 SOLUTION RESPIRATORY (INHALATION) at 19:36

## 2025-02-03 RX ADMIN — AZITHROMYCIN DIHYDRATE 500 MG: 250 TABLET ORAL at 08:07

## 2025-02-03 RX ADMIN — Medication 1 HALF-TAB: at 08:04

## 2025-02-03 RX ADMIN — FUROSEMIDE 20 MG: 20 TABLET ORAL at 16:01

## 2025-02-03 RX ADMIN — ASPIRIN 81 MG: 81 TABLET, COATED ORAL at 08:07

## 2025-02-03 RX ADMIN — Medication 1 HALF-TAB: at 14:10

## 2025-02-03 RX ADMIN — CARBIDOPA AND LEVODOPA 1 TABLET: 25; 100 TABLET ORAL at 14:10

## 2025-02-03 RX ADMIN — POTASSIUM CHLORIDE 20 MEQ: 1500 TABLET, EXTENDED RELEASE ORAL at 08:07

## 2025-02-03 RX ADMIN — CARBIDOPA AND LEVODOPA 1 TABLET: 25; 100 TABLET ORAL at 21:15

## 2025-02-03 RX ADMIN — LEVOTHYROXINE SODIUM 112 MCG: 112 TABLET ORAL at 08:04

## 2025-02-03 ASSESSMENT — ACTIVITIES OF DAILY LIVING (ADL)
ADLS_ACUITY_SCORE: 42

## 2025-02-03 NOTE — PROGRESS NOTES
"[facility]    Medicine Progress Note - Hospitalist Service, GOLD TEAM 17    Date of Admission:  1/4/2025    Assessment & Plan     Patient is a 80 y/o man who has a past medical history significant for thyroid cancer with lung metastases, Parkinson's disease, neuropathy, glaucoma, osteoporosis, chronic pain, severe COPD, hyperlipidemia, obstructive sleep apnea and coronary artery disease. Patient presented on 1/04/2025 with a 7 day history of productive cough and a 1 day history of weakness. CT pulmonary angiogram was negative for pulmonary embolism. Patient as admitted with  COPD exacerbation  and possible  community-acquired pneumonia as well.      Patient reportedly saw Pulmonology in Oct-2024 and COPD was moderately well-controlled at that time. Activity was apparently limited much more by his balance and parkinsonism than by respiratory status. Patient has antibiotics and prednisone available for outpatient COPD flares but apparently has not recently used this.      Patient apparently just entered hospice in the past week prior to hospital admission. Patient stated on admission that he was too weak and he and his daughter called their hospice agency and 911 was called and he was brought into the hospital. When provider asked on day of presentation whether patient wished for hospitalization and treatment of his potential infection, patient stated that he was seeking treatment and reported that his goal was to \"be able to do things for myself\". It was discussed that hospice typically focused on comfort rather than diagnostic testing and aggressive medical treatment. Patient acknowledged this but had seemed unsure of what his true desires for his care are moving forward.      Patient was on ceftriaxone from 1/04 - 1/08.  azithromycin 500 mg from 1/05-1/07-,  zosyn 1/09-1/ 14- now on every other day       Patient's overall prognosis is poor. Patient has been seen by " palliative care      2/3  - he thinks  steroids helped breathing a bit but not much  - ongoing shortness of breath , ? End stage COPD , O2 and steroid dependent   - has had increased lasix ( 20 mg po bid from daily ) since 1/31  and got IV x 1 2/3 to see if helps   - also gave steroid  bursts  40 mg x 5 days  1/28-2/1 as had exp wheezing that resolved and now back at 2 mg daily ( PTA dose ) , he might need longer burst or higher maintenance dose , daughter also questions if he needs PRN BIPAP   - 2/3 has good air exchanged, not again with scattered exp wheeze ( was better past few days )   -duonebs qid, Trelegy    - gets morphine 5 mg 5 x a day for shortness of breath /air hunger    - 2/1  CXR showed improved left base atx/consolidation , no new opacities    - influenza RSV COVId swabs negative 2/1   - asked pulmonary to see  and see if we can do any more med adjustments    - he is not sure if he wants  hospice on discharge and he also had not given any definite answers to his  daughter , did ask palliative care to also resee patient reg goals of cares/symtpom management  and he would prefer to see palliative cares after he speaks with pulmonary         Pneumonia  Sepsis   - completed antibiotics  - on admission felt to have sepsis   - Patient initially was treated for a possible community-acquired pneumonia on admission with ceftriaxone and azithromycin.  - Patient subsequently was treated for possible hospital-acquired pneumonia with zosyn.     COPD exacerbation, O2 and steroid dependent   Emphysema   Chronic hypoxemic respiratory failure  - shortness of breath  with minimal exertion  - sees pulmonary as out patient , has not been interested in pulmonary rehab   - recent prolonged hospitalization with COPD exacerbation    -PFTs of 3/24/2023 showing FEV1/FVC of 1.3/2.4 (46/65% predicted, respectively) for ratio 54%. TLC is reduced at 5.1 (74% predicted) and DLCO uncorrected is very low at 10.1 (41% predicted).   -  "was on prednisone 40 mg daily from 1/04/2025 to 1/08/2025. Patient received prednisone 20 mg once on 1/09- then  methylprednisolone 40 mg IV every 8 hours from 1/09- 1/13 and is no longer on steroids  - 1/28  started prednisone 40 mg x 5 days then 2 mg daily as he had been on this  those for a while prior and suspect he is steroid dependent , state she was taking the prednisone 2 mg pre admission   - uses Trelegy and getting it here   - on O2 1-2 litres at baseline.  - CT pulmonary angio 1/4 had no pulmonary embolism  . \"Waxing and waning pulmonary nodularity with a new 8 mm nodular focus in the left lower lobe. Findings are likely secondary to a chronic infectious/inflammatory process including aspiration. Consider short-term follow-up CT in one month for reassessment. 3. Additional sub-6 mm pulmonary nodules are stable compared to prior. 4. Moderate to severe emphysematous changes of the lung parenchyma with chronic fibrotic changes along the right minor fissure.\"  - Patient patient's daughter, patient may be placed on BiPAP if needed and per daughter he had been on BIPAP intermittently that helped with his breathing   - suspect has element of pulmonary hypertension , on lasix, did increase to bid 1/30       Coronary artery disease with past MI s/p stent Cx  1997  placement  HDL   - at time of initiall stent placement he was noted to have 80-90% lesion in nondominant RCA  and 50 % LAD that was treated medically   - Patient usually on aspirin and crestor , restarted aspirin   - Patient continuing metoprolol succinate 50 mg daily.       Hypertension  - continue PTA metoprolol succinate.  - continue lasix , increased to bid as above 1/30 , also increased Kcl     History of traumatic  subdural hemorrhage in 2023  - since resolved per NS follow up      Parkinson's disease  - recently diagnosed   -Has seen neurology and on sinemet and continue     Frequent falls  - with left femur fracture and inf pubic rami fracture, " also history rib fracture        Metastatic thyroid cancer (multifocal papillary carcinoma)   Status post  thyroidectomy 2021   also received radioactive iodine   - had lymph node metastases,.  - Further surveillance as outpatient.  - continue levothyroxine         Right upper lobe lung mass,  s/p radiation therapy with possible radiation pneumonitis:  -  had previous VATS biopsy in Feb-2022 that was non-diagnostic.     New lung nodule in left lower lobe  - follow up  pulmonary         Thrombocytopenia  -  last plt count was 72K 1/14, then went to  54K ---59K ---85K, was low on admission  - he denies alcohol consumption  - cont to monitor   - restarted ASA    - peripheral smear nonrevealing        Rheumatoid arthritis with positive rheumatoid factor:  - Per chart review, patient had once been on methotrexate and prednisone but had stopped these medications a few years ago.  - Patient to f/u as outpatient if desired.     Osteopenia   -had been on calcium and vit D       History of immunodeficency  - IG subclass 2      Hypernatremia  - improved:      Neuropathy   chronic pain syndrome:  - Patient currently on gabapentin 400 mg three times a day.  --his  PTA robaxin 500mg TID has been on hold   -Hold PTA trazadone 50mg tab QHS  -Continue PTA oxycodone 5mg Q6H for pain      Glaucoma:  Dry eyes    - Patient not currently on medication for this.  - Patient to f/u with Ophthalmology as outpatient if further treatment is desired.       osteoporosis  h/o right femoral neck fracture s/p right total hip replacement   -hold PTA denosumab 60mg injection every 6 months (last 10/25/2024)  -restarted  PTA calcium , vitamin D  - prolia q 6 mo          Moderate malnutrition in the context of acute vs. chronic illness:  - Supplementing as able.       Patient  was ok for me to call his daughter Janey , I tried , there was no answer   He was seen by palliative cares , possible discharge  to Hospice, when I talked to py 1/28 he stated  that t he wanted to get better   2/2 called daughter Janey and discussed above         Diet: Snacks/Supplements Adult: Ensure Enlive; With Meals  Regular Diet Adult Thin Liquids (level 0)    DVT Prophylaxis: consider subcutaneous heparin   Mir Catheter: Not present  Lines: None     Cardiac Monitoring: None  Code Status: No CPR- Do NOT Intubate      Clinically Significant Risk Factors         # Hypernatremia: Highest Na = 146 mmol/L in last 2 days, will monitor as appropriate           # Hypertension: Noted on problem list     # Acute Hypoxic Respiratory Failure: Documented O2 saturation < 90%. Continue supplemental oxygen as needed          # Moderate Malnutrition: based on nutrition assessment    # Financial/Environmental Concerns:           Social Drivers of Health    Tobacco Use: Medium Risk (10/18/2024)    Patient History     Smoking Tobacco Use: Former     Smokeless Tobacco Use: Never   Physical Activity: Inactive (10/14/2024)    Exercise Vital Sign     Days of Exercise per Week: 0 days     Minutes of Exercise per Session: 0 min   Stress: Stress Concern Present (10/14/2024)    [employer] [place] [employer] - Occupational Stress Questionnaire     Feeling of Stress : Very much   Social Connections: Unknown (10/14/2024)    Social Connection and Isolation Panel [NHANES]     Frequency of Social Gatherings with Friends and Family: More than three times a week          Disposition Plan     Medically Ready for Discharge: Anticipated in 5+ Days once bed is available              Opal Bowman MD  Hospitalist Service, [address]  Securely message with [employer] (more info)  Text page via [facility] Paging/Directory   See signed in provider for up to date coverage information  ______________________________________________________________________    Interval History   He  cannot really tell if breathing is better, he seems to be speaking a bit better but  still seems to have significant shortness of breath  , when not talking breathing is calm   Physical Exam   Vital Signs: Temp: 97.7  F (36.5  C) Temp src: Oral BP: 116/63 Pulse: 69   Resp: 16 SpO2: 92 % O2 Device: None (Room air) Oxygen Delivery: 2 LPM  Weight: 156 lbs 8 oz  General appearence: awake alert  , some shortness of breath  but in  no apparent distress    RESPIRATORY: lungs  scattered exp wheezing       CARDIOVASCULAR:S1 S2 regular rate and rhythm,  GASTROINTESTINAL:soft, non-distended , non-tender , + bowel sounds,    SKIN: warm and dry, no mottling noted   NEUROLOGIC; awake alert and oriented,   EXTREMITIES: no clubbing, cyanosis or edema       Data     I have personally reviewed the following data over the past 24 hrs:    N/A  \   N/A   / N/A     146 (H) 103 18.2 /  77   4.5 33 (H) 0.87 \       Imaging results reviewed over the past 24 hrs:   No results found for this or any previous visit (from the past 24 hours).

## 2025-02-03 NOTE — PROGRESS NOTES
"  PALLIATIVE CARE PROGRESS NOTE  [facility]     Patient Name: Harrison Thomas  Date of Admission: 1/4/2025   Today the patient was seen for: Follow up goals, symptom management     Recommendations & Counseling     GOALS OF CARE:   Plan of care - Life-prolonging with limits (DNR/DNI) - desiring to be discharged to LTC without hospice services  Of note, Harrison had seen us in the palliative clinic 10/24/2024 and it was noted that he seemed to be very demoralized at that time. He was enrolled with hospice 1 week prior to admission on 1/4/2025 but states he was too weak and his daughter called the hospice agency and 911 was called to bring him into the hospital. He voiced that he wished to seek treatment and \"Be able to do things for himself.\"  Previous plan had to been to discharge to facility with hospice.  Currently he was declined admission to [facility] on 1/20/2025 as they were prioritizing for the patients who are sicker.  Currently pending placement to long-term care with or without hospice.  Met with Harrison. When asked, he states that ideally, he would want to \"live longer and well\". Noted that living longer means hospice is NOT the recommended path for him and informed him what that entails (e.g. rehospitalization, escalation of care if needed to sustain life, vitals, labs, doctor visits, and interventions if needed/offered). Also noted that he can barely walk to the restroom without feeling dyspneic and needing rest so his functional status is unlikely to improve much. Further noted that this often affects quality of life and that living longer and living well may be mutually exclusive.   During my visit Harrison often accused everyone in the hospital of \"just wanting to kick me out of the hospital and then have me kick the can\" and said \"if you all want me to die why don't you just give me a pill so I can die?\"   Noted that I personally am not here to push him towards a " hospice pathway or not and that [place] does not support physician aid in dying and that. Further emphasized again that sometimes quantity and quality of life are mutually exclusive.   Stated that pulm consult from yesterday notes that he is on a good regimen and made some med recs to adjust but honestly, stated that he may not get much better than he is at this point, both in context of end-stage (?) COPD, radiation pneumonitis, with a metastatic thyroid cancer looming in the background as well.   Harrison then asked for a cancer team to speak to him to see what else can be offered for treatment. Seems like he follows with [facility], I cannot access his records but per chart review, it seems like the cancer is in surveillance and may be less of an issue than the lung disease in the future.   Of note, CT orbits from 11/27/2024 noted moderate parenchymal atrophy and giving history of Parkinson's I am uncertain if Harrison actually has capacity to make decisions for himself.   Tried to calling pamela Toth @ ~1222 but she did not answer. Left voicemail stating I would try and call her back. Tried calling again @ ~1422, no answer, did not leave voicemail.  Recs for Plan -   Clarify metastatic thyroid cancer treatment plan - consider oncology consult.  Continue to look for long-term care facilities WITHOUT hospice (stated he wants to live longer)   Recommend OT consult for MOCA/SLUMS (last one was ~5/2024 and was 26/30).  If pamela Toth is in disagreement with Harrison's wishes, would need to a care conference with her and Harrison. If SLUMS shows moderate and/or severe cognitive deficit, concenr that Harrison likely does not have capacity and would defer to Janey for decisions.  Generally speaking, Harrison seems lonely and is not ready to die. He feels like people view him as a nuisance, are ignoring him, and wanting him to die. Spiritual health consult placed for emotional support    ADVANCE CARE PLANNING:  Patient has an  advance directive dated 3/13/2023.  Primary Health Care Agent Adeola Thomas - spouse but currently divorce.  Alternate(s) daughter- Janey.    He stated that he did not want to involve his (ex-)wife any longer and asked that daughter Janey to support him with decision making. As such, Janey has been serving as surrogate decision maker with input from Harrison as able in setting of fluctuating mentation  There is a POLST form on file, but will need to be updated prior to discharge.  Code status: No CPR- Do NOT Intubate    MEDICAL MANAGEMENT: all med recs for 2/4 done for you  **Important to family to manage symptoms while also maximizing alertness in order to continue to interact with loved ones.    #Pain,chronic  #Dyspnea,COPD  #Secretions  #Lethargy, fluctuating mentation  Mentation clear. Seems to be alert and oriented but questionable capacity  START Mucinex 600 mg BID scheduled  Trelegy daily  Duonebs QID scheduled and q4h prn  Saline nebs prn  Fan at bedside - doesn't like air blowing on his face. OK to not use   Supplemental O2.  Morphine 5mg SL 5 times daily and 5-10mg q2h PRN  Lorazepam 0.25 mg q6h PRN  Diuresis per primary team   Appreciate pharmacy liaison consult for [employer]/Bel[employer]uca coverage 2/3. PAs approved.  Butrans 10/20 @ ~$158/314 per month   Belbuca 150/300 @ $452/628 month.  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  Family perceives Harrison is increasingly anxious but doesn't often recognize this himself.   Gabapentin  as above  Robaxin as above  Lorazepam 0.25 mg q6h PRN - encourage use for anxiety and/or dyspnea  Consider  selective serotonin reuptake inhibitor or SNRI for mood    PSYCHOSOCIAL/SPIRITUAL:  Family - lives at home with daughter Janey  [facility] [employer]    Palliative Care will follow.    Total time spent was 80 minutes regarding goals of care and support and symptom control on the date of the encounter. These recommendations were given to the primary team via this note/via [employer].    Nathaniel Wilhelm DO / Internal and Palliative Medicine   Securely message with the [employer] Web Console (learn more here)   Text page via [facility] Paging/Directory        Assessment          Harrison Thomas is a 79 year old male with a past medical history of thyroid cancer with lung metastases, parkinson's disease, neuropathy, glaucoma, osteoporosis, chronic pain, severe COPD, HLD, BRENNEN, CAD who presented on 1/4 with a 7 day history of productive cough and 1 day history of weakness. Upon admission CT pulmonary angio done and was negative for PE, treated for COPD exacerbation and PNA. Prior to admission he enrolled in hospice however presented to the hospital after being unable to reach hospice and then needed to call 911.     Palliative was consulted for goals of care and symptom control on 1/6/2025.  Signed off on 1/24/2025.        Interval History:      Multidisciplinary collaboration:  Chart reviewed. Received Ativan 0.25 mg po x1 from 0700 to 0700 along with scheduled medications.    Patient/family narrative  Seen and examined at bedside. Pulm saw and made some recs. Visit as above.      Review of Systems:     >4 ROS negative unless stated otherwise above        Physical Exam:   Temp:  [97.7  F (36.5  C)-98.2  F (36.8  C)] 97.7  F (36.5  C)  Pulse:  [69-79] 69  Resp:  [16] 16  BP: (113-117)/(55-63) 116/63  SpO2:  [92 %-97 %] 96 %  156 lbs 8 oz    Physical Exam  Constitutional: sitting up in bed, NAD   Lungs: +tachypneic, +increased work of breathing but able to eat and speak in short phrases, secretions noted  Abdomen: non-distended  Neurologic:  alert and maintained alertness throughout visit but query if he has full capacity  Skin: No rashes, erythema        Data Reviewed:     Imaging  EXAM: XR CHEST 2 VIEWS 2/1/2025 2:13 PM   MPRESSION: Improved left base atelectasis/consolidation. No new opacities.    Labs  CMP  Recent Labs   Lab 02/03/25  0556 02/01/25  0608 01/28/25  0826   *  --  144   POTASSIUM 4.5 3.4 4.2   CHLORIDE 103  --  105   CO2 33*  --  30*   ANIONGAP 10  --  9   GLC 77  --  85   BUN 18.2  --  6.5*   CR 0.87  --  0.72   GFRESTIMATED 89  --  >90   KARLIE 9.1  --  8.5*     CBC  Recent Labs   Lab 01/31/25  0808 01/29/25  0604 01/28/25  0826   WBC 5.9 4.5 5.6   RBC 3.29* 3.61* 3.58*   HGB 10.3* 11.1* 11.5*   HCT 30.8* 32.7* 33.6*   MCV 94 91 94   MCH 31.3 30.7 32.1   MCHC 33.4 33.9 34.2   RDW 16.5* 15.9* 15.9*   PLT 85* 59* 54*     INRNo lab results found in last 7 days.  Arterial Blood GasNo lab results found in last 7 days.

## 2025-02-03 NOTE — PROGRESS NOTES
Care Management Follow Up    Length of Stay (days): 30    Expected Discharge Date: 02/06/2025     Concerns to be Addressed: discharge planning     Patient plan of care discussed at interdisciplinary rounds: Yes    Anticipated Discharge Disposition: Home, Hospice     Anticipated Discharge Services: Other (see comment) (current on home hospice)  Anticipated Discharge DME: None    Patient/family educated on Medicare website which has current facility and service quality ratings:    Education Provided on the Discharge Plan: Yes  Patient/Family in Agreement with the Plan:      Referrals Placed by CM/SW:   [place] [facility] [place]  Ph: [phone]  Fax :[phone]  2/3/25:     [facility]  Ph: [phone]-  2/3/25:  left with Leila in marketing requesting call  back.    Private pay costs discussed: Not applicable    Discussed  Partnership in Safe Discharge Planning  document with patient/family: Yes:     Handoff Completed: No, handoff not indicated or clinically appropriate    Additional Information:  JARAD met with Pt to obtain LTC discharge choices. Pt identified that his daughter  had list.  JARAD called and spoke with Pt's daughter Janey Ph: [phone]. Janey identified the above two locations to for SW to send referrals to this evening and identified that she will review the rest of  the list with her father and aunt this evening and call SW tomorrow afternoon.     Next Steps:   JARAD/RNCC will continue to follow.     Antonio Houston, [facility] HospitalSW  [facility] [place] [facility]   Ph: [phone]

## 2025-02-03 NOTE — PROGRESS NOTES
Prior Authorization Approval    Medication: BELBUCA 150 MCG BU FILM  PA Initiated: 2/3/2025  PA Type: Non-Formulary    Insurance: [employer] Part D - Phone [phone] Fax [phone]  [facility] Key / Reference #: [id number] / [id number]   Authorization Effective Dates: 1/1/2025 - 2/3/2026    Expected CoPay: $ 452.05  CoPay Card Eligible: No    Filling Pharmacy: [place] [id number]  Comments:  Proactive PA. Please send a script to the discharge pharmacy.    Elena Boone 2/3  [facility] Pharmacy Liaison, [place]  Ph: [phone]  Fax: [phone]  Available on Teams and Seng
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
## 2025-02-03 NOTE — PLAN OF CARE
Goal Outcome Evaluation:    Plan of Care Reviewed With: patient    Overall Patient Progress: no change    A/O x4    2 L O2 via NC   [refuses continuous pulse ox]    SBA w/ walker  [Refuses GB]    R PIV SL    Reddened coccyx covered w/ mepilex  Scattered bruises and scabs  R forearm skin tear covered w/ mepilex    Regular diet/thin liquids  Takes medications whole - Large pills separate and 1 at a time    Continent  - [facility] 2/1    No acute events overnight    Plan: waiting on placement    Call light within reach, will continue to monitor and follow POC

## 2025-02-03 NOTE — CONSULTS
Discharge Pharmacy Test Claim    [facility] with an unmet deductible with $564.28 remaining. Prior authorization was approved for both buprenorphine patches and belbuca film. Expected copays listed below. Once patient meets $2000 out of pocket, copays reduce to $0.    Test Claim Copay   buprenorphine 10mcg patch 158.64   buprenorphine 20mcg patch 314.64   belbuca 150mcg film 452.05   belbuca 300mcg film 628.44     Elena Boone 2/3  [facility] Pharmacy Liaison, [place]  Ph: [phone]  Fax: [phone]  Available on Teams and [employer] - [employer]  Disclaimer: Pharmacy test claims are estimates and may not reflect final costs. Suggested alternatives aim to be cost-effective and may not be therapeutically equivalent. This consult is informational and does not constitute medical advice. Clinical decisions should be made by qualified healthcare providers.

## 2025-02-03 NOTE — PROGRESS NOTES
CLINICAL NUTRITION SERVICES - REASSESSMENT NOTE     RECOMMENDATIONS FOR MDs/PROVIDERS TO ORDER:  None    Malnutrition Status:    Moderate malnutrition in the context of acute illness    Registered Dietitian Interventions:  Encouraged intakes   Decrease to BID Ensure    Future/Additional Recommendations:  Monitor labs, intakes, and weight trends.  Monitor GOC per palliative      SUBJECTIVE INFORMATION  Assessed patient in room. Pt reports eating well, no questions or concerns. Drinks 1.5-2 Ensures daily and reports does not need 3 sent daily. Encouraged intakes, commended efforts.     CURRENT NUTRITION ORDERS  Diet: Regular  Supplements: Ensure Enlive with all meals    CURRENT INTAKE/TOLERANCE  Poorly documented over last week.     Pt ordering (on average) 3220 kcal and 73 g protein per day per [employer]. Supplements add 1050 kcal and 60 g protein daily.  With documented and reported intakes, pt is likely meeting minimum energy and protein needs.     NEW FINDINGS  Weight: Pt with 1 lb weight loss during admission, fluctuations likely fluid related.   02/05/25 0915 71.1 kg (156 lb 11.2 oz)  Standing scale   02/02/25 0700 71 kg (156 lb 8 oz) Standing scale   02/01/25 0625 71.3 kg (157 lb 1.6 oz)  Standing scale   01/31/25 0805 71.7 kg (158 lb)  Standing scale   01/30/25 1236 71.6 kg (157 lb 14.4 oz)  Standing scale   01/28/25 1100 72 kg (158 lb 11.2 oz) Standing scale   01/27/25 1241 72.5 kg (159 lb 14.4 oz)  --   01/26/25 0655 73 kg (160 lb 14.4 oz) Standing scale   01/25/25 0735 72.3 kg (159 lb 6.4 oz) Standing scale   01/20/25 0500 73.3 kg (161 lb 9.6 oz) --   01/17/25 0615 73.5 kg (162 lb 1.6 oz) Standing scale   01/16/25 0623 72.8 kg (160 lb 6.4 oz) Standing scale   01/14/25 0839 72.8 kg (160 lb 9.6 oz) Standing scale   01/13/25 1100 71.5 kg (157 lb 10.1 oz) Standing scale   01/13/25 0653 72.1 kg (159 lb) Standing scale   01/12/25 0739 72.7 kg (160 lb 4.8 oz) Standing scale   01/11/25 0746 73.1 kg (161 lb 1.6  oz) Standing scale   01/06/25 0556 71.6 kg (157 lb 14.4 oz) Standing scale     10/24/24 71.5 kg (157 lb 9.6 oz)   10/18/24 70.8 kg (156 lb)   09/23/24 70.9 kg (156 lb 6 oz)   09/20/24 70.8 kg (156 lb)   07/18/24 73 kg (161 lb)   06/19/24 73 kg (161 lb)   05/30/24 70.8 kg (156 lb)   05/14/24 77.1 kg (170 lb)   05/10/24 77.1 kg (170 lb)   05/08/24 77.5 kg (170 lb 12.8 oz)   05/01/24 78.6 kg (173 lb 3.2 oz)   04/29/24 78.7 kg (173 lb 6.4 oz)   04/26/24 75.9 kg (167 lb 6.4 oz)     Skin/wounds:   Lee 18 (Nutrition score: 3)  GI symptoms: None reported by pt LB 2/4   Nutrition-relevant labs: Reviewed  Nutrition-relevant medications: Azithromycin, Oscal, lasix, morphine, Thera-vit-M, potassium chloride, prednisone, vitamin D3    Please see palliative note for pt GOC discussions.     MALNUTRITION  % Intake: No decreased intake noted  % Weight Loss: Weight loss does not meet criteria   Subcutaneous Fat Loss: Triceps: Mild  Muscle Loss: Wasting of the temples (temporalis muscle): Mild  Fluid Accumulation/Edema: None noted  Malnutrition Diagnosis: Moderate malnutrition in the context of acute on chronic illness  Malnutrition Present on Admission: Unable to assess    EVALUATION OF THE PROGRESS TOWARD GOALS    Previous Goals  Patient to consume % of nutritionally adequate meal trays TID, or the equivalent with supplements/snacks.  Evaluation: Progressing    Previous Nutrition Diagnosis  Predicted inadequate protein-energy intake related to variable appetite as evidenced by pt reliant on PO intakes to meet 100% of nutritional needs with potential for variation  Evaluation: No change    NUTRITION DIAGNOSIS  Predicted inadequate protein-energy intake related to variable appetite as evidenced by pt reliant on PO intakes to meet 100% of nutritional needs with potential for variation    INTERVENTIONS  Medical food supplement therapy  Nutrition counseling strategies    Goals  Patient to consume % of nutritionally  adequate meal trays TID, or the equivalent with supplements/snacks.     Monitoring/Evaluation      Progress toward goals will be monitored and evaluated per policy.    Kristine Zarate MS, RDN, LD  TCU/OB/Ortho Clinical Dietitian  Available via phone and Vocera  Phone: [phone]  Vocera: 5 Ortho Clinical Dietitian  Weekend/Holiday Vocera: Weekend Holiday Clinical Dietitian [Multi Site Groups]

## 2025-02-03 NOTE — PROGRESS NOTES
Prior Authorization Approval    Medication: BUPRENORPHINE 10 MCG/HR TD PTWK  PA Initiated: 2/3/2025  PA Type: Clinical    Insurance: [employer] Part D - Phone [phone] Fax [phone]  [facility] / Reference #: [id number] / [id number]   Authorization Effective Dates: 1/1/2025 - 2/3/2026    Expected CoPay: $ 158.64  CoPay Card Eligible: No    Filling Pharmacy: [facility] [address]  Comments:  Proactive PA. Please send a script to the discharge pharmacy.    Elena Boone 2/3  [facility] Pharmacy Liaison, [place]  Ph: [phone]  Fax: [phone]  Available on Teams and Vocera

## 2025-02-04 LAB
ANION GAP SERPL CALCULATED.3IONS-SCNC: 10 MMOL/L (ref 7–15)
BUN SERPL-MCNC: 18.2 MG/DL (ref 8–23)
CALCIUM SERPL-MCNC: 9.1 MG/DL (ref 8.8–10.4)
CHLORIDE SERPL-SCNC: 101 MMOL/L (ref 98–107)
CREAT SERPL-MCNC: 0.84 MG/DL (ref 0.67–1.17)
EGFRCR SERPLBLD CKD-EPI 2021: 90 ML/MIN/1.73M2
GLUCOSE SERPL-MCNC: 87 MG/DL (ref 70–99)
HCO3 SERPL-SCNC: 32 MMOL/L (ref 22–29)
HOLD SPECIMEN: NORMAL
POTASSIUM SERPL-SCNC: 3.8 MMOL/L (ref 3.4–5.3)
SODIUM SERPL-SCNC: 143 MMOL/L (ref 135–145)

## 2025-02-04 PROCEDURE — 250N000013 HC RX MED GY IP 250 OP 250 PS 637

## 2025-02-04 PROCEDURE — 250N000013 HC RX MED GY IP 250 OP 250 PS 637: Performed by: INTERNAL MEDICINE

## 2025-02-04 PROCEDURE — 250N000012 HC RX MED GY IP 250 OP 636 PS 637: Performed by: INTERNAL MEDICINE

## 2025-02-04 PROCEDURE — 80048 BASIC METABOLIC PNL TOTAL CA: CPT | Performed by: INTERNAL MEDICINE

## 2025-02-04 PROCEDURE — 250N000009 HC RX 250

## 2025-02-04 PROCEDURE — 250N000013 HC RX MED GY IP 250 OP 250 PS 637: Performed by: STUDENT IN AN ORGANIZED HEALTH CARE EDUCATION/TRAINING PROGRAM

## 2025-02-04 PROCEDURE — 999N000157 HC STATISTIC RCP TIME EA 10 MIN

## 2025-02-04 PROCEDURE — 120N000002 HC R&B MED SURG/OB UMMC

## 2025-02-04 PROCEDURE — 99418 PROLNG IP/OBS E/M EA 15 MIN: CPT | Performed by: STUDENT IN AN ORGANIZED HEALTH CARE EDUCATION/TRAINING PROGRAM

## 2025-02-04 PROCEDURE — 94640 AIRWAY INHALATION TREATMENT: CPT | Mod: 76

## 2025-02-04 PROCEDURE — 36415 COLL VENOUS BLD VENIPUNCTURE: CPT | Performed by: INTERNAL MEDICINE

## 2025-02-04 PROCEDURE — 94640 AIRWAY INHALATION TREATMENT: CPT

## 2025-02-04 PROCEDURE — 99232 SBSQ HOSP IP/OBS MODERATE 35: CPT | Performed by: INTERNAL MEDICINE

## 2025-02-04 PROCEDURE — 99233 SBSQ HOSP IP/OBS HIGH 50: CPT | Performed by: STUDENT IN AN ORGANIZED HEALTH CARE EDUCATION/TRAINING PROGRAM

## 2025-02-04 RX ORDER — GUAIFENESIN 600 MG/1
600 TABLET, EXTENDED RELEASE ORAL 2 TIMES DAILY
Status: DISCONTINUED | OUTPATIENT
Start: 2025-02-04 | End: 2025-02-18 | Stop reason: HOSPADM

## 2025-02-04 RX ADMIN — IPRATROPIUM BROMIDE AND ALBUTEROL SULFATE 3 ML: .5; 3 SOLUTION RESPIRATORY (INHALATION) at 13:01

## 2025-02-04 RX ADMIN — CARBIDOPA AND LEVODOPA 1 TABLET: 25; 100 TABLET ORAL at 20:01

## 2025-02-04 RX ADMIN — ROSUVASTATIN 10 MG: 10 TABLET, FILM COATED ORAL at 07:49

## 2025-02-04 RX ADMIN — Medication 1 HALF-TAB: at 13:24

## 2025-02-04 RX ADMIN — PREDNISONE 2 MG: 1 TABLET ORAL at 07:48

## 2025-02-04 RX ADMIN — Medication 1 TABLET: at 07:48

## 2025-02-04 RX ADMIN — Medication 25 MCG: at 07:50

## 2025-02-04 RX ADMIN — GABAPENTIN 400 MG: 300 CAPSULE ORAL at 07:48

## 2025-02-04 RX ADMIN — FUROSEMIDE 20 MG: 20 TABLET ORAL at 16:26

## 2025-02-04 RX ADMIN — CARBIDOPA AND LEVODOPA 1 TABLET: 25; 100 TABLET ORAL at 07:49

## 2025-02-04 RX ADMIN — CARBIDOPA AND LEVODOPA 1 TABLET: 25; 100 TABLET ORAL at 13:24

## 2025-02-04 RX ADMIN — GABAPENTIN 400 MG: 300 CAPSULE ORAL at 13:24

## 2025-02-04 RX ADMIN — LEVOTHYROXINE SODIUM 112 MCG: 112 TABLET ORAL at 07:50

## 2025-02-04 RX ADMIN — IPRATROPIUM BROMIDE AND ALBUTEROL SULFATE 3 ML: .5; 3 SOLUTION RESPIRATORY (INHALATION) at 08:26

## 2025-02-04 RX ADMIN — MORPHINE SULFATE 5 MG: 20 SOLUTION ORAL at 07:49

## 2025-02-04 RX ADMIN — Medication 1 HALF-TAB: at 20:01

## 2025-02-04 RX ADMIN — Medication 1 HALF-TAB: at 07:49

## 2025-02-04 RX ADMIN — GABAPENTIN 400 MG: 300 CAPSULE ORAL at 20:00

## 2025-02-04 RX ADMIN — Medication 600 MG: at 07:48

## 2025-02-04 RX ADMIN — MORPHINE SULFATE 5 MG: 20 SOLUTION ORAL at 22:11

## 2025-02-04 RX ADMIN — FUROSEMIDE 20 MG: 20 TABLET ORAL at 07:48

## 2025-02-04 RX ADMIN — ASPIRIN 81 MG: 81 TABLET, COATED ORAL at 07:48

## 2025-02-04 RX ADMIN — MORPHINE SULFATE 5 MG: 20 SOLUTION ORAL at 18:04

## 2025-02-04 RX ADMIN — ASPIRIN 81 MG: 81 TABLET, COATED ORAL at 20:01

## 2025-02-04 RX ADMIN — POTASSIUM CHLORIDE 20 MEQ: 1500 TABLET, EXTENDED RELEASE ORAL at 07:50

## 2025-02-04 RX ADMIN — GUAIFENESIN 600 MG: 600 TABLET ORAL at 20:01

## 2025-02-04 RX ADMIN — MORPHINE SULFATE 5 MG: 20 SOLUTION ORAL at 13:25

## 2025-02-04 RX ADMIN — POTASSIUM CHLORIDE 20 MEQ: 1500 TABLET, EXTENDED RELEASE ORAL at 20:01

## 2025-02-04 RX ADMIN — IPRATROPIUM BROMIDE AND ALBUTEROL SULFATE 3 ML: .5; 3 SOLUTION RESPIRATORY (INHALATION) at 16:19

## 2025-02-04 RX ADMIN — MORPHINE SULFATE 5 MG: 20 SOLUTION ORAL at 11:38

## 2025-02-04 RX ADMIN — IPRATROPIUM BROMIDE AND ALBUTEROL SULFATE 3 ML: .5; 3 SOLUTION RESPIRATORY (INHALATION) at 19:41

## 2025-02-04 ASSESSMENT — ACTIVITIES OF DAILY LIVING (ADL)
ADLS_ACUITY_SCORE: 42

## 2025-02-04 NOTE — PROGRESS NOTES
"[facility]    Medicine Progress Note - Hospitalist Service, GOLD TEAM 18    Date of Admission:  1/4/2025    Assessment & Plan     Patient is a 78 y/o man who has a past medical history significant for thyroid cancer with lung metastases, Parkinson's disease, neuropathy, glaucoma, osteoporosis, chronic pain, severe COPD, hyperlipidemia, obstructive sleep apnea and coronary artery disease. Patient presented on 1/04/2025 with a 7 day history of productive cough and a 1 day history of weakness. CT pulmonary angiogram was negative for pulmonary embolism. Patient as admitted with  COPD exacerbation  and possible  community-acquired pneumonia as well.      Patient reportedly saw Pulmonology in Oct-2024 and COPD was moderately well-controlled at that time. Activity was apparently limited much more by his balance and parkinsonism than by respiratory status. Patient has antibiotics and prednisone available for outpatient COPD flares but apparently has not recently used this.      Patient apparently just entered hospice in the past week prior to hospital admission. Patient stated on admission that he was too weak and he and his daughter called their hospice agency and 911 was called and he was brought into the hospital. When provider asked on day of presentation whether patient wished for hospitalization and treatment of his potential infection, patient stated that he was seeking treatment and reported that his goal was to \"be able to do things for myself\". It was discussed that hospice typically focused on comfort rather than diagnostic testing and aggressive medical treatment. Patient acknowledged this but had seemed unsure of what his true desires for his care are moving forward.      Patient was on ceftriaxone from 1/04 - 1/08.  azithromycin 500 mg from 1/05-1/07-,  zosyn 1/09-1/ 14- now on every other day       Patient's overall prognosis is poor. Patient has been seen by " palliative care      2/4  Patient is up in chair.  He was on 1 L of oxygen via nasal cannula.  He appears comfortable but  Very congested with audible breathing sounds.  He was on a burst of steroid therapy for 5 days and then back on the PTA prednisone dose.  Palliative care medicine started him on Mucinex to help with the congestion.  He is satting okay on the current 1 L of oxygen.    He is afebrile.  He is awaiting placement in TCU.  Palliative care recommends that the patient is not appropriate for hospice care at this moment and may come back to the hospital for acute care if necessary.  Have recommended to follow-up with the patient while he is here.     Pneumonia  Sepsis   - completed antibiotics  - on admission felt to have sepsis   - Patient initially was treated for a possible community-acquired pneumonia on admission with ceftriaxone and azithromycin.  - Patient subsequently was treated for possible hospital-acquired pneumonia with zosyn.     COPD exacerbation, O2 and steroid dependent   Emphysema   Chronic hypoxemic respiratory failure  - shortness of breath  with minimal exertion  - sees pulmonary as out patient , has not been interested in pulmonary rehab   - recent prolonged hospitalization with COPD exacerbation    -PFTs of 3/24/2023 showing FEV1/FVC of 1.3/2.4 (46/65% predicted, respectively) for ratio 54%. TLC is reduced at 5.1 (74% predicted) and DLCO uncorrected is very low at 10.1 (41% predicted).   - was on prednisone 40 mg daily from 1/04/2025 to 1/08/2025. Patient received prednisone 20 mg once on 1/09- then  methylprednisolone 40 mg IV every 8 hours from 1/09- 1/13 and is no longer on steroids  - 1/28  started prednisone 40 mg x 5 days then 2 mg daily as he had been on this  those for a while prior and suspect he is steroid dependent , state she was taking the prednisone 2 mg pre admission   - uses Trelegy and getting it here   - on O2 1-2 litres at baseline.  - CT pulmonary angio 1/4 had  "no pulmonary embolism  . \"Waxing and waning pulmonary nodularity with a new 8 mm nodular focus in the left lower lobe. Findings are likely secondary to a chronic infectious/inflammatory process including aspiration. Consider short-term follow-up CT in one month for reassessment. 3. Additional sub-6 mm pulmonary nodules are stable compared to prior. 4. Moderate to severe emphysematous changes of the lung parenchyma with chronic fibrotic changes along the right minor fissure.\"  - Patient patient's daughter, patient may be placed on BiPAP if needed and per daughter he had been on BIPAP intermittently that helped with his breathing   - suspect has element of pulmonary hypertension , on lasix, did increase to bid 1/30       Coronary artery disease with past MI s/p stent Cx  1997  placement  HDL   - at time of initiall stent placement he was noted to have 80-90% lesion in nondominant RCA  and 50 % LAD that was treated medically   - Patient usually on aspirin and crestor , restarted aspirin   - Patient continuing metoprolol succinate 50 mg daily.       Hypertension  - continue PTA metoprolol succinate.  - continue lasix , increased to bid as above 1/30 , also increased Kcl     History of traumatic  subdural hemorrhage in 2023  - since resolved per NS follow up      Parkinson's disease  - recently diagnosed   -Has seen neurology and on sinemet and continue     Frequent falls  - with left femur fracture and inf pubic rami fracture, also history rib fracture        Metastatic thyroid cancer (multifocal papillary carcinoma)   Status post  thyroidectomy 2021   also received radioactive iodine   - had lymph node metastases,.  - Further surveillance as outpatient.  - continue levothyroxine         Right upper lobe lung mass,  s/p radiation therapy with possible radiation pneumonitis:  -  had previous VATS biopsy in Feb-2022 that was non-diagnostic.     New lung nodule in left lower lobe  - follow up  pulmonary       "   Thrombocytopenia  -  last plt count was 72K 1/14, then went to  54K ---59K ---85K, was low on admission  - he denies alcohol consumption  - cont to monitor   - restarted ASA    - peripheral smear nonrevealing        Rheumatoid arthritis with positive rheumatoid factor:  - Per chart review, patient had once been on methotrexate and prednisone but had stopped these medications a few years ago.  - Patient to f/u as outpatient if desired.     Osteopenia   -had been on calcium and vit D       History of immunodeficency  - IG subclass 2      Hypernatremia  - improved:      Neuropathy   chronic pain syndrome:  - Patient currently on gabapentin 400 mg three times a day.  --his  PTA robaxin 500mg TID has been on hold   -Hold PTA trazadone 50mg tab QHS  -Continue PTA oxycodone 5mg Q6H for pain      Glaucoma:  Dry eyes    - Patient not currently on medication for this.  - Patient to f/u with Ophthalmology as outpatient if further treatment is desired.       osteoporosis  h/o right femoral neck fracture s/p right total hip replacement   -hold PTA denosumab 60mg injection every 6 months (last 10/25/2024)  -restarted  PTA calcium , vitamin D  - prolia q 6 mo          Moderate malnutrition in the context of acute vs. chronic illness:  - Supplementing as able.   Cognitive impairment.  Palliative care is questioning patient's decision-making capacity and recommending OT consult for SLUMS/MOCA     Diet: Snacks/Supplements Adult: Ensure Enlive; With Meals  Regular Diet Adult Thin Liquids (level 0)    DVT Prophylaxis: consider subcutaneous heparin   Mir Catheter: Not present  Lines: None     Cardiac Monitoring: None  Code Status: No CPR- Do NOT Intubate      Clinically Significant Risk Factors         # Hypernatremia: Highest Na = 146 mmol/L in last 2 days, will monitor as appropriate           # Hypertension: Noted on problem list     # Acute Hypoxic Respiratory Failure: Documented O2 saturation < 90%. Continue supplemental oxygen  as needed          # Moderate Malnutrition: based on nutrition assessment    # Financial/Environmental Concerns:           Social Drivers of Health    Tobacco Use: Medium Risk (10/18/2024)    Patient History     Smoking Tobacco Use: Former     Smokeless Tobacco Use: Never   Physical Activity: Inactive (10/14/2024)    Exercise Vital Sign     Days of Exercise per Week: 0 days     Minutes of Exercise per Session: 0 min   Stress: Stress Concern Present (10/14/2024)    [place] citizen [place] of Occupational [employer] - Occupational Stress Questionnaire     Feeling of Stress : Very much   Social Connections: Unknown (10/14/2024)    Social Connection and Isolation Panel [NHANES]     Frequency of Social Gatherings with Friends and Family: More than three times a week          Disposition Plan     Medically Ready for Discharge: Anticipated in 5+ Days once bed is available              Marce Anderson MD  Hospitalist Service, [facility] 18  [facility]  Securely message with [employer] (more info)  Text page via [facility] Paging/Directory   See signed in provider for up to date coverage information  ______________________________________________________________________    Interval History   He  cannot really tell if breathing is better, he seems to be speaking a bit better but still seems to have significant shortness of breath  , when not talking breathing is calm     Current Facility-Administered Medications:     acetaminophen (TYLENOL) tablet 650 mg, 650 mg, Oral, Q4H PRN, David Saba MD    amLODIPine (NORVASC) tablet 5 mg, 5 mg, Oral, At Bedtime, Samantha Niño MD, 5 mg at 01/31/25 2237    aspirin EC tablet 81 mg, 81 mg, Oral, BID, Opal Bowman MD, 81 mg at 02/04/25 0748    azithromycin (ZITHROMAX) tablet 500 mg, 500 mg, Oral, Every Other Day, Samantha Niño MD, 500 mg at 02/03/25 0807    calcium carbonate (OS-KARLIE) tablet 600 mg, 600 mg, Oral, Daily, Opal Bowman MD, 600 mg at  02/04/25 0748    carbidopa-levodopa (SINEMET)  MG per tablet 1 tablet, 1 tablet, Oral, TID, 1 tablet at 02/04/25 1324 **AND** carbidopa-levodopa half-tab 12.5-50 mg, 1 half-tab, Oral, TID, Veronica Jauregui MD, 1 half-tab at 02/04/25 1324    carboxymethylcellulose PF (REFRESH PLUS) 0.5 % ophthalmic solution 1 drop, 1 drop, Both Eyes, Q1H PRN, Veronica Jauregui MD, 1 drop at 01/25/25 1945    Fluticasone-Umeclidin-Vilanterol (TRELEGY ELLIPTA) 200-62.5-25 MCG/ACT oral inhaler 1 puff, 1 puff, Inhalation, Daily, Gonzalez Watt MD, 1 puff at 02/04/25 0758    furosemide (LASIX) tablet 20 mg, 20 mg, Oral, BID, Opal Bowman MD, 20 mg at 02/04/25 0748    gabapentin (NEURONTIN) capsule 400 mg, 400 mg, Oral, TID, David Saba MD, 400 mg at 02/04/25 1324    guaiFENesin (MUCINEX) 12 hr tablet 600 mg, 600 mg, Oral, BID, Tino Wilhelm MD    ipratropium - albuterol 0.5 mg/2.5 mg/3 mL (DUONEB) neb solution 3 mL, 3 mL, Nebulization, Q4H PRN, Samantha Niño MD, 3 mL at 01/31/25 0038    ipratropium - albuterol 0.5 mg/2.5 mg/3 mL (DUONEB) neb solution 3 mL, 3 mL, Nebulization, 4x daily, Veronica Jauregui MD, 3 mL at 02/04/25 1301    levothyroxine (SYNTHROID/LEVOTHROID) tablet 112 mcg, 112 mcg, Oral, Daily, Veronica Jauregui MD, 112 mcg at 02/04/25 0750    lidocaine (LMX4) cream, , Topical, Q1H PRN, Veronica Jauregui MD    lidocaine 1 % 0.1-1 mL, 0.1-1 mL, Other, Q1H PRN, Veronica Jauregui MD    loperamide (IMODIUM) capsule 2 mg, 2 mg, Oral, 4x Daily PRN, Gonzalez Watt MD    LORazepam (ATIVAN) half-tab 0.25 mg, 0.25 mg, Oral, Q6H PRN, Antoinette Solorio PA-C, 0.25 mg at 02/03/25 1417    melatonin tablet 5 mg, 5 mg, Oral, At Bedtime PRN, David Saba MD    methocarbamol (ROBAXIN) tablet 500 mg, 500 mg, Oral, 4x Daily PRN, Antoinette Solorio PA-C    metoprolol succinate ER (TOPROL XL) 24 hr tablet 50 mg, 50 mg, Oral, Daily, David Saba MD, 50 mg at 02/03/25 0807    morphine sulfate (ROXANOL) 20  mg/mL (HIGH CONC) soln 5 mg, 5 mg, Oral, 5x Daily, Samantha Niño MD, 5 mg at 02/04/25 1325    [DISCONTINUED] morphine sulfate (ROXANOL) 20 mg/mL (HIGH CONC) soln 5 mg, 5 mg, Oral, Q2H PRN **OR** morphine sulfate (ROXANOL) 20 mg/mL (HIGH CONC) soln 5-10 mg, 5-10 mg, Oral, Q2H PRN, Antoinette Solorio PA-C, 10 mg at 01/28/25 0022    multivitamin w/minerals (THERA-VIT-M) tablet 1 tablet, 1 tablet, Oral, Daily, Opal Bowman MD, 1 tablet at 02/04/25 0748    naloxone (NARCAN) injection 0.2 mg, 0.2 mg, Intravenous, Q2 Min PRN **OR** naloxone (NARCAN) injection 0.4 mg, 0.4 mg, Intravenous, Q2 Min PRN **OR** naloxone (NARCAN) injection 0.2 mg, 0.2 mg, Intramuscular, Q2 Min PRN **OR** naloxone (NARCAN) injection 0.4 mg, 0.4 mg, Intramuscular, Q2 Min PRN, Veronica Jauregui MD    ondansetron (ZOFRAN ODT) ODT tab 4 mg, 4 mg, Oral, Q6H PRN, David Saba MD    polyethylene glycol (MIRALAX) Packet 17 g, 17 g, Oral, BID PRN, Veronica Jauregui MD    potassium chloride santo ER (KLOR-CON M20) CR tablet 20 mEq, 20 mEq, Oral, BID, Opal Bowman MD, 20 mEq at 02/04/25 0750    predniSONE (DELTASONE) tablet 2 mg, 2 mg, Oral, Daily, Opal Bowman MD, 2 mg at 02/04/25 0748    rosuvastatin (CRESTOR) tablet 10 mg, 10 mg, Oral, Daily, Samantha Niño MD, 10 mg at 02/04/25 0749    senna-docusate (SENOKOT-S/PERICOLACE) 8.6-50 MG per tablet 1 tablet, 1 tablet, Oral, BID PRN **OR** senna-docusate (SENOKOT-S/PERICOLACE) 8.6-50 MG per tablet 2 tablet, 2 tablet, Oral, BID PRN, Veronica Jauregui MD, 2 tablet at 01/11/25 1651    sodium chloride (PF) 0.9% PF flush 3 mL, 3 mL, Intracatheter, Q8H, Veronica Jauregui MD, 3 mL at 02/04/25 0751    sodium chloride (PF) 0.9% PF flush 3 mL, 3 mL, Intracatheter, q1 min prn, Veronica Jauregui MD, 3 mL at 02/04/25 0140    Vitamin D3 (CHOLECALCIFEROL) tablet 25 mcg, 25 mcg, Oral, Daily, Opal Bowman MD, 25 mcg at 02/04/25 0750    Physical Exam   Vital Signs: Temp: 97.5  F (36.4  C) Temp  src: Oral BP: 113/64 Pulse: 56   Resp: 18 SpO2: 93 % O2 Device: Nasal cannula Oxygen Delivery: 1 LPM  Weight: 156 lbs 8 oz  General appearence: awake alert  , some shortness of breath  but in  no apparent distress    RESPIRATORY: lungs  scattered exp wheezing       CARDIOVASCULAR:S1 S2 regular rate and rhythm,  GASTROINTESTINAL:soft, non-distended , non-tender , + bowel sounds,    SKIN: warm and dry, no mottling noted   NEUROLOGIC; awake alert and oriented,   EXTREMITIES: no clubbing, cyanosis or edema       Data     I have personally reviewed the following data over the past 24 hrs:    N/A  \   N/A   / N/A     143 101 18.2 /  87   3.8 32 (H) 0.84 \       Imaging results reviewed over the past 24 hrs:   No results found for this or any previous visit (from the past 24 hours).

## 2025-02-04 NOTE — CONSULTS
This note is to acknowledge receiving the reconsult for [facility]. Please refer to progress note from today 2/4 for more details.    Nathaniel Wilhelm DO / Internal and Palliative Medicine   Securely message with the [employer] Web Console (learn more here)   Text page via [employer] Paging/Directory

## 2025-02-04 NOTE — PROGRESS NOTES
Care Management Follow Up    Length of Stay (days): 31    Expected Discharge Date: 02/06/2025     Concerns to be Addressed: discharge planning     Patient plan of care discussed at interdisciplinary rounds: Yes    Anticipated Discharge Disposition: Home, Hospice      Anticipated Discharge Services: Other (see comment) (current on home hospice)  Anticipated Discharge DME: None    Patient/family educated on Medicare website which has current facility and service quality ratings:    Education Provided on the Discharge Plan: Yes  Patient/Family in Agreement with the Plan:      Referrals Placed by CM/SW:    [facility]  [address]  [address]  P: [phone]  P: [phone] - Admissions  F: [phone]   2/4: Referral sent    [facility]  Ph: [phone]-  2/3/25:  left with Leila in marketing requesting call  back.    Declined:  [facility]  Ph: [phone]  Fax :[phone]  2/3/25: referral sent  2/4/25: Can't take  any more MA Pending cases  Private pay costs discussed: Not applicable    Discussed  Partnership in Safe Discharge Planning  document with patient/family: Yes:     Handoff Completed: No, handoff not indicated or clinically appropriate    Additional Information:  Pt reviewed in rounds. SW spoke with Pt's  daughter  to obtain additional LTC recs and she identified Pt had identified [facility]. SW provided updated on [facility] declining. SW will continue to follow for safe discharge planning and placement.    Next Steps:   SW/RNCC will continue  to follow for safe discharge  planning and placement.    Antonio Houston, LICSW  [facility]   Ph: [phone]

## 2025-02-04 NOTE — PLAN OF CARE
Goal Outcome Evaluation:           Overall Patient Progress: no changeOverall Patient Progress: no change         VS: /64   Pulse 56   Temp 97.5  F (36.4  C)   Resp 18   Wt 71 kg (156 lb 8 oz)   SpO2 93%   BMI 23.45 kg/m       O2: > 88% on 1 L via NC, denies chest pain, report SOB ; same as yesterday, refuses continous pulse ox   Output: Voids spontaneously and adequately    Last BM: 2/3/25   Activity: Ax1 with walker, pt refuses gait belt   Skin: Redness to coccyx mepliex in place, scattered bruises and scabs to BUE, R forearm skin tear covered with mepliex    Pain: Pain managed with scheduled meds    CMS: AO x4, reports numbness and tingling to BLE (baseline)   Dressing: Sacrum and R forearm mepliex in place and CDI    Diet: Regular    LDA: R PIV SL   Equipment: IV pole, call light, personal walker and personal belongings    Plan: Waiting on placement (SNF)    Additional Info:

## 2025-02-04 NOTE — PLAN OF CARE
Goal Outcome Evaluation:      Plan of Care Reviewed With: patient    Overall Patient Progress: improvingOverall Patient Progress: improving       VS: /59 (BP Location: Left arm, Patient Position: Semi-Rodriguez's, Cuff Size: Adult Regular)   Pulse 78   Temp 97.7  F (36.5  C) (Oral)   Resp 18   Wt 71 kg (156 lb 8 oz)   SpO2 97%   BMI 23.45 kg/m      O2: > 88% on 1 L via NC, denies chest pain, report SOB on exertion, refuses continous pulse ox   Output: Voids spontaneously and adequately    Last BM: 2/3/25   Activity: Ax1 with walker, pt refuses gait belt   Skin: Redness to coccyx mepliex in place, scattered bruises and scabs to BUE, R forearm skin tear covered with mepliex    Pain: Reports 5/10 pain to back, pain managed with scheduled morphine    CMS: AO x4, reports numbness and tingling to BLE (baseline)   Dressing: Sacrum and R forearm mepliex in place and CDI    Diet: Regular    LDA: R PIV SL   Equipment: IV pole, call light, personal walker and personal belongings    Plan: Waiting on placement    Additional Info:

## 2025-02-05 ENCOUNTER — APPOINTMENT (OUTPATIENT)
Dept: CT IMAGING | Facility: CLINIC | Age: 80
DRG: 871 | End: 2025-02-05
Attending: INTERNAL MEDICINE
Payer: MEDICARE

## 2025-02-05 LAB
ANION GAP SERPL CALCULATED.3IONS-SCNC: 10 MMOL/L (ref 7–15)
BUN SERPL-MCNC: 19.1 MG/DL (ref 8–23)
CALCIUM SERPL-MCNC: 9.4 MG/DL (ref 8.8–10.4)
CHLORIDE SERPL-SCNC: 102 MMOL/L (ref 98–107)
CREAT SERPL-MCNC: 0.88 MG/DL (ref 0.67–1.17)
EGFRCR SERPLBLD CKD-EPI 2021: 89 ML/MIN/1.73M2
GLUCOSE SERPL-MCNC: 89 MG/DL (ref 70–99)
HCO3 SERPL-SCNC: 31 MMOL/L (ref 22–29)
POTASSIUM SERPL-SCNC: 4.2 MMOL/L (ref 3.4–5.3)
SODIUM SERPL-SCNC: 143 MMOL/L (ref 135–145)

## 2025-02-05 PROCEDURE — 250N000013 HC RX MED GY IP 250 OP 250 PS 637: Performed by: INTERNAL MEDICINE

## 2025-02-05 PROCEDURE — 250N000012 HC RX MED GY IP 250 OP 636 PS 637: Performed by: INTERNAL MEDICINE

## 2025-02-05 PROCEDURE — 71250 CT THORAX DX C-: CPT

## 2025-02-05 PROCEDURE — 120N000002 HC R&B MED SURG/OB UMMC

## 2025-02-05 PROCEDURE — 94640 AIRWAY INHALATION TREATMENT: CPT | Mod: 76

## 2025-02-05 PROCEDURE — 250N000013 HC RX MED GY IP 250 OP 250 PS 637

## 2025-02-05 PROCEDURE — 71250 CT THORAX DX C-: CPT | Mod: 26 | Performed by: RADIOLOGY

## 2025-02-05 PROCEDURE — 94640 AIRWAY INHALATION TREATMENT: CPT

## 2025-02-05 PROCEDURE — 250N000013 HC RX MED GY IP 250 OP 250 PS 637: Performed by: STUDENT IN AN ORGANIZED HEALTH CARE EDUCATION/TRAINING PROGRAM

## 2025-02-05 PROCEDURE — 99232 SBSQ HOSP IP/OBS MODERATE 35: CPT | Performed by: INTERNAL MEDICINE

## 2025-02-05 PROCEDURE — 36415 COLL VENOUS BLD VENIPUNCTURE: CPT | Performed by: INTERNAL MEDICINE

## 2025-02-05 PROCEDURE — 999N000157 HC STATISTIC RCP TIME EA 10 MIN

## 2025-02-05 PROCEDURE — 80048 BASIC METABOLIC PNL TOTAL CA: CPT | Performed by: INTERNAL MEDICINE

## 2025-02-05 PROCEDURE — 250N000009 HC RX 250

## 2025-02-05 RX ADMIN — Medication 1 HALF-TAB: at 20:07

## 2025-02-05 RX ADMIN — GABAPENTIN 400 MG: 300 CAPSULE ORAL at 20:07

## 2025-02-05 RX ADMIN — GUAIFENESIN 600 MG: 600 TABLET ORAL at 20:07

## 2025-02-05 RX ADMIN — MORPHINE SULFATE 5 MG: 20 SOLUTION ORAL at 22:17

## 2025-02-05 RX ADMIN — Medication 1 TABLET: at 08:42

## 2025-02-05 RX ADMIN — Medication 25 MCG: at 08:43

## 2025-02-05 RX ADMIN — IPRATROPIUM BROMIDE AND ALBUTEROL SULFATE 3 ML: .5; 3 SOLUTION RESPIRATORY (INHALATION) at 17:20

## 2025-02-05 RX ADMIN — ROSUVASTATIN 10 MG: 10 TABLET, FILM COATED ORAL at 08:43

## 2025-02-05 RX ADMIN — LEVOTHYROXINE SODIUM 112 MCG: 112 TABLET ORAL at 08:42

## 2025-02-05 RX ADMIN — FUROSEMIDE 20 MG: 20 TABLET ORAL at 08:43

## 2025-02-05 RX ADMIN — Medication 1 HALF-TAB: at 08:43

## 2025-02-05 RX ADMIN — MORPHINE SULFATE 5 MG: 20 SOLUTION ORAL at 13:41

## 2025-02-05 RX ADMIN — CARBIDOPA AND LEVODOPA 1 TABLET: 25; 100 TABLET ORAL at 08:42

## 2025-02-05 RX ADMIN — AZITHROMYCIN DIHYDRATE 500 MG: 250 TABLET ORAL at 08:43

## 2025-02-05 RX ADMIN — FUROSEMIDE 20 MG: 20 TABLET ORAL at 17:47

## 2025-02-05 RX ADMIN — IPRATROPIUM BROMIDE AND ALBUTEROL SULFATE 3 ML: .5; 3 SOLUTION RESPIRATORY (INHALATION) at 09:36

## 2025-02-05 RX ADMIN — PREDNISONE 2 MG: 1 TABLET ORAL at 08:42

## 2025-02-05 RX ADMIN — IPRATROPIUM BROMIDE AND ALBUTEROL SULFATE 3 ML: .5; 3 SOLUTION RESPIRATORY (INHALATION) at 20:52

## 2025-02-05 RX ADMIN — IPRATROPIUM BROMIDE AND ALBUTEROL SULFATE 3 ML: .5; 3 SOLUTION RESPIRATORY (INHALATION) at 13:32

## 2025-02-05 RX ADMIN — MORPHINE SULFATE 5 MG: 20 SOLUTION ORAL at 17:44

## 2025-02-05 RX ADMIN — MORPHINE SULFATE 5 MG: 20 SOLUTION ORAL at 08:38

## 2025-02-05 RX ADMIN — POTASSIUM CHLORIDE 20 MEQ: 1500 TABLET, EXTENDED RELEASE ORAL at 20:07

## 2025-02-05 RX ADMIN — Medication 600 MG: at 08:39

## 2025-02-05 RX ADMIN — GABAPENTIN 400 MG: 300 CAPSULE ORAL at 13:42

## 2025-02-05 RX ADMIN — GUAIFENESIN 600 MG: 600 TABLET ORAL at 08:42

## 2025-02-05 RX ADMIN — METOPROLOL SUCCINATE 50 MG: 50 TABLET, EXTENDED RELEASE ORAL at 08:42

## 2025-02-05 RX ADMIN — CARBIDOPA AND LEVODOPA 1 TABLET: 25; 100 TABLET ORAL at 13:42

## 2025-02-05 RX ADMIN — ASPIRIN 81 MG: 81 TABLET, COATED ORAL at 20:07

## 2025-02-05 RX ADMIN — POTASSIUM CHLORIDE 20 MEQ: 1500 TABLET, EXTENDED RELEASE ORAL at 08:40

## 2025-02-05 RX ADMIN — MORPHINE SULFATE 5 MG: 20 SOLUTION ORAL at 10:55

## 2025-02-05 RX ADMIN — Medication 1 HALF-TAB: at 13:42

## 2025-02-05 RX ADMIN — ASPIRIN 81 MG: 81 TABLET, COATED ORAL at 08:41

## 2025-02-05 RX ADMIN — GABAPENTIN 400 MG: 300 CAPSULE ORAL at 08:50

## 2025-02-05 RX ADMIN — CARBIDOPA AND LEVODOPA 1 TABLET: 25; 100 TABLET ORAL at 20:07

## 2025-02-05 ASSESSMENT — ACTIVITIES OF DAILY LIVING (ADL)
ADLS_ACUITY_SCORE: 42

## 2025-02-05 NOTE — PROGRESS NOTES
"[facility]    Medicine Progress Note - Hospitalist Service, GOLD TEAM 18    Date of Admission:  1/4/2025    Assessment & Plan     Patient is a 78 y/o man who has a past medical history significant for thyroid cancer with lung metastases, Parkinson's disease, neuropathy, glaucoma, osteoporosis, chronic pain, severe COPD, hyperlipidemia, obstructive sleep apnea and coronary artery disease. Patient presented on 1/04/2025 with a 7 day history of productive cough and a 1 day history of weakness. CT pulmonary angiogram was negative for pulmonary embolism. Patient as admitted with  COPD exacerbation  and possible  community-acquired pneumonia as well.      Patient reportedly saw Pulmonology in Oct-2024 and COPD was moderately well-controlled at that time. Activity was apparently limited much more by his balance and parkinsonism than by respiratory status. Patient has antibiotics and prednisone available for outpatient COPD flares but apparently has not recently used this.      Patient apparently just entered hospice in the past week prior to hospital admission. Patient stated on admission that he was too weak and he and his daughter called their hospice agency and 911 was called and he was brought into the hospital. When provider asked on day of presentation whether patient wished for hospitalization and treatment of his potential infection, patient stated that he was seeking treatment and reported that his goal was to \"be able to do things for myself\". It was discussed that hospice typically focused on comfort rather than diagnostic testing and aggressive medical treatment. Patient acknowledged this but had seemed unsure of what his true desires for his care are moving forward.      Patient was on ceftriaxone from 1/04 - 1/08.  azithromycin 500 mg from 1/05-1/07-,  zosyn 1/09-1/ 14- now on every other day       Patient's overall prognosis is poor. Patient has been seen by " palliative care      2/5  Patient is up in chair.   Complains of feeling fatigued. He denied pain  He is on 1  L of oxygen via nasal cannula. He is not wheezing.  He was on a burst of steroid therapy for 5 days and then back on the PTA prednisone dose.  Palliative care medicine started him on Mucinex to help with the congestion.  He is satting okay on the current 1 L of oxygen.    He is afebrile.  He is awaiting placement in TCU.  Palliative care recommends that the patient is not appropriate for hospice care at this moment and may come back to the hospital for acute care if necessary.  Have recommended to follow-up with the patient while he is here.     Pneumonia  Sepsis   - completed antibiotics  - on admission felt to have sepsis   - Patient initially was treated for a possible community-acquired pneumonia on admission with ceftriaxone and azithromycin.  - Patient subsequently was treated for possible hospital-acquired pneumonia with zosyn.     COPD exacerbation, O2 and steroid dependent   Emphysema   Chronic hypoxemic respiratory failure  - shortness of breath  with minimal exertion  - sees pulmonary as out patient , has not been interested in pulmonary rehab   - recent prolonged hospitalization with COPD exacerbation    -PFTs of 3/24/2023 showing FEV1/FVC of 1.3/2.4 (46/65% predicted, respectively) for ratio 54%. TLC is reduced at 5.1 (74% predicted) and DLCO uncorrected is very low at 10.1 (41% predicted).   - was on prednisone 40 mg daily from 1/04/2025 to 1/08/2025. Patient received prednisone 20 mg once on 1/09- then  methylprednisolone 40 mg IV every 8 hours from 1/09- 1/13 and is no longer on steroids  - 1/28  started prednisone 40 mg x 5 days then 2 mg daily as he had been on this  those for a while prior and suspect he is steroid dependent , state she was taking the prednisone 2 mg pre admission   - uses Trelegy and getting it here   - on O2 1-2 litres at baseline.  - CT pulmonary angio 1/4 had no  "pulmonary embolism  . \"Waxing and waning pulmonary nodularity with a new 8 mm nodular focus in the left lower lobe. Findings are likely secondary to a chronic infectious/inflammatory process including aspiration. Consider short-term follow-up CT in one month for reassessment. 3. Additional sub-6 mm pulmonary nodules are stable compared to prior. 4. Moderate to severe emphysematous changes of the lung parenchyma with chronic fibrotic changes along the right minor fissure.\"  - Patient patient's daughter, patient may be placed on BiPAP if needed and per daughter he had been on BIPAP intermittently that helped with his breathing   - suspect has element of pulmonary hypertension , on lasix, did increase to bid 1/30       Coronary artery disease with past MI s/p stent Cx  1997  placement  HDL   - at time of initiall stent placement he was noted to have 80-90% lesion in nondominant RCA  and 50 % LAD that was treated medically   - Patient usually on aspirin and crestor , restarted aspirin   - Patient continuing metoprolol succinate 50 mg daily.       Hypertension  - continue PTA metoprolol succinate.  - continue lasix , increased to bid as above 1/30 , also increased Kcl     History of traumatic  subdural hemorrhage in 2023  - since resolved per NS follow up      Parkinson's disease  - recently diagnosed   -Has seen neurology and on sinemet and continue     Frequent falls  - with left femur fracture and inf pubic rami fracture, also history rib fracture        Metastatic thyroid cancer (multifocal papillary carcinoma)   Status post  thyroidectomy 2021   also received radioactive iodine   - had lymph node metastases,.  - Further surveillance as outpatient.  - continue levothyroxine         Right upper lobe lung mass,  s/p radiation therapy with possible radiation pneumonitis:  -  had previous VATS biopsy in Feb-2022 that was non-diagnostic.     New lung nodule in left lower lobe  - follow up  pulmonary       "   Thrombocytopenia  -  last plt count was 72K 1/14, then went to  54K ---59K ---85K, was low on admission  - he denies alcohol consumption  - cont to monitor   - restarted ASA    - peripheral smear nonrevealing        Rheumatoid arthritis with positive rheumatoid factor:  - Per chart review, patient had once been on methotrexate and prednisone but had stopped these medications a few years ago.  - Patient to f/u as outpatient if desired.     Osteopenia   -had been on calcium and vit D       History of immunodeficency  - IG subclass 2      Hypernatremia  - improved:      Neuropathy   chronic pain syndrome:  - Patient currently on gabapentin 400 mg three times a day.  --his  PTA robaxin 500mg TID has been on hold   -Hold PTA trazadone 50mg tab QHS  -Continue PTA oxycodone 5mg Q6H for pain      Glaucoma:  Dry eyes    - Patient not currently on medication for this.  - Patient to f/u with Ophthalmology as outpatient if further treatment is desired.       osteoporosis  h/o right femoral neck fracture s/p right total hip replacement   -hold PTA denosumab 60mg injection every 6 months (last 10/25/2024)  -restarted  PTA calcium , vitamin D  - prolia q 6 mo          Moderate malnutrition in the context of acute vs. chronic illness:  - Supplementing as able.   Cognitive impairment.  Palliative care is questioning patient's decision-making capacity and recommending OT consult for SLUMS/MOCA     Diet: Snacks/Supplements Adult: Ensure Enlive; With Meals  Regular Diet Adult Thin Liquids (level 0)    DVT Prophylaxis: consider subcutaneous heparin   Mir Catheter: Not present  Lines: None     Cardiac Monitoring: None  Code Status: No CPR- Do NOT Intubate      Clinically Significant Risk Factors                   # Hypertension: Noted on problem list     # Acute Hypoxic Respiratory Failure: Documented O2 saturation < 90%. Continue supplemental oxygen as needed          # Moderate Malnutrition: based on nutrition assessment    #  Financial/Environmental Concerns:           Social Drivers of Health    Tobacco Use: Medium Risk (10/18/2024)    Patient History     Smoking Tobacco Use: Former     Smokeless Tobacco Use: Never   Physical Activity: Inactive (10/14/2024)    Exercise Vital Sign     Days of Exercise per Week: 0 days     Minutes of Exercise per Session: 0 min   Stress: Stress Concern Present (10/14/2024)    [employer] [place] [employer] - Occupational Stress Questionnaire     Feeling of Stress : Very much   Social Connections: Unknown (10/14/2024)    Social Connection and Isolation Panel [NHANES]     Frequency of Social Gatherings with Friends and Family: More than three times a week          Disposition Plan     Medically Ready for Discharge: Anticipated in 5+ Days once bed is available              Marce Anderson MD  Hospitalist Service, [facility] 18  [facility]  Securely message with [employer] (more info)  Text page via [employer] Paging/Directory   See signed in provider for up to date coverage information  ______________________________________________________________________    Interval History   He  cannot really tell if breathing is better, he seems to be speaking a bit better but still seems to have significant shortness of breath  , when not talking breathing is calm     Current Facility-Administered Medications:     acetaminophen (TYLENOL) tablet 650 mg, 650 mg, Oral, Q4H PRN, David Saba MD    amLODIPine (NORVASC) tablet 5 mg, 5 mg, Oral, At Bedtime, Samantha Niño MD, 5 mg at 01/31/25 2237    aspirin EC tablet 81 mg, 81 mg, Oral, BID, Opal Bowman MD, 81 mg at 02/05/25 0841    azithromycin (ZITHROMAX) tablet 500 mg, 500 mg, Oral, Every Other Day, Samantha Niño MD, 500 mg at 02/05/25 0843    calcium carbonate (OS-KARLIE) tablet 600 mg, 600 mg, Oral, Daily, Opal Bowman MD, 600 mg at 02/05/25 0839    carbidopa-levodopa (SINEMET)  MG per tablet 1 tablet, 1  tablet, Oral, TID, 1 tablet at 02/05/25 0842 **AND** carbidopa-levodopa half-tab 12.5-50 mg, 1 half-tab, Oral, TID, Veronica Jauregui MD, 1 half-tab at 02/05/25 0843    carboxymethylcellulose PF (REFRESH PLUS) 0.5 % ophthalmic solution 1 drop, 1 drop, Both Eyes, Q1H PRN, Veronica Jauregui MD, 1 drop at 01/25/25 1945    Fluticasone-Umeclidin-Vilanterol (TRELEGY ELLIPTA) 200-62.5-25 MCG/ACT oral inhaler 1 puff, 1 puff, Inhalation, Daily, Gonzalez Watt MD, 1 puff at 02/05/25 0844    furosemide (LASIX) tablet 20 mg, 20 mg, Oral, BID, Opal Bowman MD, 20 mg at 02/05/25 0843    gabapentin (NEURONTIN) capsule 400 mg, 400 mg, Oral, TID, David Saba MD, 400 mg at 02/05/25 0850    guaiFENesin (MUCINEX) 12 hr tablet 600 mg, 600 mg, Oral, BID, Tino Wilhelm MD, 600 mg at 02/05/25 0842    ipratropium - albuterol 0.5 mg/2.5 mg/3 mL (DUONEB) neb solution 3 mL, 3 mL, Nebulization, Q4H PRN, Samantha Niño MD, 3 mL at 01/31/25 0038    ipratropium - albuterol 0.5 mg/2.5 mg/3 mL (DUONEB) neb solution 3 mL, 3 mL, Nebulization, 4x daily, Veronica Jauregui MD, 3 mL at 02/05/25 0936    levothyroxine (SYNTHROID/LEVOTHROID) tablet 112 mcg, 112 mcg, Oral, Daily, Veronica Jauregui MD, 112 mcg at 02/05/25 0842    lidocaine (LMX4) cream, , Topical, Q1H PRN, Veronica Jauregui MD    lidocaine 1 % 0.1-1 mL, 0.1-1 mL, Other, Q1H PRN, Veronica Jauregui MD    loperamide (IMODIUM) capsule 2 mg, 2 mg, Oral, 4x Daily PRN, Gonzalez Watt MD    LORazepam (ATIVAN) half-tab 0.25 mg, 0.25 mg, Oral, Q6H PRN, Antoinette Solorio PA-C, 0.25 mg at 02/03/25 1417    melatonin tablet 5 mg, 5 mg, Oral, At Bedtime PRN, David Saba MD    methocarbamol (ROBAXIN) tablet 500 mg, 500 mg, Oral, 4x Daily PRN, Antoinette Solorio PA-C    metoprolol succinate ER (TOPROL XL) 24 hr tablet 50 mg, 50 mg, Oral, Daily, David Saba MD, 50 mg at 02/05/25 0842    morphine sulfate (ROXANOL) 20 mg/mL (HIGH CONC) soln 5 mg, 5 mg, Oral, 5x Daily,  Samantha Niño MD, 5 mg at 02/05/25 1055    [DISCONTINUED] morphine sulfate (ROXANOL) 20 mg/mL (HIGH CONC) soln 5 mg, 5 mg, Oral, Q2H PRN **OR** morphine sulfate (ROXANOL) 20 mg/mL (HIGH CONC) soln 5-10 mg, 5-10 mg, Oral, Q2H PRN, Antoinette Solorio PA-C, 10 mg at 01/28/25 0022    multivitamin w/minerals (THERA-VIT-M) tablet 1 tablet, 1 tablet, Oral, Daily, Opal Bowman MD, 1 tablet at 02/05/25 0842    naloxone (NARCAN) injection 0.2 mg, 0.2 mg, Intravenous, Q2 Min PRN **OR** naloxone (NARCAN) injection 0.4 mg, 0.4 mg, Intravenous, Q2 Min PRN **OR** naloxone (NARCAN) injection 0.2 mg, 0.2 mg, Intramuscular, Q2 Min PRN **OR** naloxone (NARCAN) injection 0.4 mg, 0.4 mg, Intramuscular, Q2 Min PRN, Veronica Jauregui MD    ondansetron (ZOFRAN ODT) ODT tab 4 mg, 4 mg, Oral, Q6H PRN, David Saba MD    polyethylene glycol (MIRALAX) Packet 17 g, 17 g, Oral, BID PRN, Veronica Jauregui MD    potassium chloride santo ER (KLOR-CON M20) CR tablet 20 mEq, 20 mEq, Oral, BID, Opal Bowman MD, 20 mEq at 02/05/25 0840    predniSONE (DELTASONE) tablet 2 mg, 2 mg, Oral, Daily, Opal Bowman MD, 2 mg at 02/05/25 0842    rosuvastatin (CRESTOR) tablet 10 mg, 10 mg, Oral, Daily, Samantha Niño MD, 10 mg at 02/05/25 0843    senna-docusate (SENOKOT-S/PERICOLACE) 8.6-50 MG per tablet 1 tablet, 1 tablet, Oral, BID PRN **OR** senna-docusate (SENOKOT-S/PERICOLACE) 8.6-50 MG per tablet 2 tablet, 2 tablet, Oral, BID PRN, Veronica Jauregui MD, 2 tablet at 01/11/25 1651    sodium chloride (PF) 0.9% PF flush 3 mL, 3 mL, Intracatheter, Q8H, Veronica Jauregui MD, 3 mL at 02/05/25 0843    sodium chloride (PF) 0.9% PF flush 3 mL, 3 mL, Intracatheter, q1 min prn, Veronica Jauregui MD, 3 mL at 02/04/25 0140    Vitamin D3 (CHOLECALCIFEROL) tablet 25 mcg, 25 mcg, Oral, Daily, Opal Bowman MD, 25 mcg at 02/05/25 0843    Physical Exam   Vital Signs: Temp: 97.9  F (36.6  C) Temp src: Oral BP: 116/64 Pulse: 79   Resp: 18 SpO2: 94 %  O2 Device: Nasal cannula Oxygen Delivery: 1 LPM  Weight: 156 lbs 11.2 oz  General appearence: awake alert  , some shortness of breath  but in  no apparent distress    RESPIRATORY:on 1 l of oxygen, diminished breath sounds on the bilateral lower lungs      CARDIOVASCULAR:S1 S2 regular rate and rhythm,  GASTROINTESTINAL:soft, non-distended , non-tender , + bowel sounds,    SKIN: warm and dry, no mottling noted   NEUROLOGIC; awake alert and oriented,   EXTREMITIES: no clubbing, cyanosis or edema       Data   CBC RESULTS:   Recent Labs   Lab Test 01/31/25  0808   WBC 5.9   RBC 3.29*   HGB 10.3*   HCT 30.8*   MCV 94   MCH 31.3   MCHC 33.4   RDW 16.5*   PLT 85*     Last Comprehensive Metabolic Panel:  Lab Results   Component Value Date     02/05/2025    POTASSIUM 4.2 02/05/2025    CHLORIDE 102 02/05/2025    CO2 31 (H) 02/05/2025    ANIONGAP 10 02/05/2025    GLC 89 02/05/2025    BUN 19.1 02/05/2025    CR 0.88 02/05/2025    GFRESTIMATED 89 02/05/2025    KARLIE 9.4 02/05/2025     XR Chest 2 Views   Final Result   IMPRESSION: Improved left base atelectasis/consolidation. No new   opacities.      I have personally reviewed the examination and initial interpretation   and I agree with the findings.      SHAN ROSALES MD            SYSTEM ID:  [id number]      XR Chest 2 Views   Final Result   IMPRESSION: No significant changes mammographically from 2 weeks ago.   Atherosclerosis as well.      DAVID RUIZ MD            SYSTEM ID:  [id number]      XR Chest 2 Views   Final Result   IMPRESSION: Unchanged atelectasis, mild edema or other airway   inflammation an osteoporotic mid thoracic compression deformities   unchanged. No acute consolidation or new dominant pleural effusion.      DAVID RUIZ MD            SYSTEM ID:  [id number]      XR Video Swallow with SLP or OT   Final Result   Impression:   1. Two episodes of valorie silent aspiration with thin liquid barium,   otherwise normal thin liquid trials with  occasional transient flash   penetration.    2. Please see the speech pathologist report for further details.         TESFAYE DO VINICIO            SYSTEM ID:  [id number]      XR Chest 2 Views   Final Result   Impression:    1. Decrease of right perihilar opacity. Increased left lower lobe   pulmonary opacities.   2. Unchanged linear density of the right upper lobe, likely relating   to fibrosis versus atelectasis.      I have personally reviewed the examination and initial interpretation   and I agree with the findings.      SILVANO GIANG MD            SYSTEM ID:  [id number]      CT Chest Pulmonary Embolism w Contrast   Final Result   IMPRESSION:    1. Exam is negative for acute pulmonary embolism.    2. Waxing and waning pulmonary nodularity with a new 8 mm nodular   focus in the left lower lobe. Findings are likely secondary to a   chronic infectious/inflammatory process including aspiration. Consider   short-term follow-up CT in one month for reassessment.   3. Additional sub-6 mm pulmonary nodules are stable compared to prior.   4. Moderate to severe emphysematous changes of the lung parenchyma   with chronic fibrotic changes along the right minor fissure.         In the event of a positive result for acute pulmonary embolism   resulting in right heart strain, consider calling the    [facility] hospital  for PERT (Pulmonary Embolism Response Team)   Activation?      PERT -- Pulmonary Embolism Response Team (Multidisciplinary team   including cardiology, interventional radiology, critical care,   hematology)      I have personally reviewed the examination and initial interpretation   and I agree with the findings.      TERESITA BUSTAMANTE MD            SYSTEM ID:  [id number]      Chest XR,  PA & LAT   Final Result   IMPRESSION:   1. Unchanged right upper lobe lung fibrosis with resultant volume loss   and hemidiaphragm elevation.   2. No focal airspace opacities.      I have personally reviewed the examination and  initial interpretation   and I agree with the findings.      JUAN RUELAS MD            SYSTEM ID:  [id number]

## 2025-02-05 NOTE — PROGRESS NOTES
VS: Blood pressure 135/73, pulse 88, temperature 97.5  F (36.4  C), temperature source Oral, resp. rate 18, weight 25.2 kg (55 lb 8 oz), SpO2 97%.   O2: 1 LPM via nasal cannula.   Output: Continent of bowel and bladder   Last BM: 2-4-25   Activity: SBA with 4WW, ambulated to the bathroom   Skin: Scattered bruising and scab on BUE   Pain: Complained of back pain, declined pain meds.   CMS: A&Ox4, with baseline numbness and tingling on BLE       Diet: Regular diet   LDA: PIV on R forearm SL   Equipment: Personal items, call light    Plan:    Additional Info:  Continue the plan of care

## 2025-02-05 NOTE — PROGRESS NOTES
Pt requesting to speak with Medicine MD again. Pt refused to discuss further with writer. Medicine MD paged.

## 2025-02-05 NOTE — PLAN OF CARE
Goal Outcome Evaluation:           Overall Patient Progress: no changeOverall Patient Progress: no change         VS: /64   Pulse 79   Temp 97.9  F (36.6  C)   Resp 18   Wt 25.2 kg (55 lb 8 oz)   SpO2 94%   BMI 8.32 kg/m    Denies chest pain. SOB same as yesterday    O2: 1 LPM O2 via nasal cannula.   Output: Continent of bowel and bladder   Last BM: 2-4-25   Activity: SBA with 4WW, refused gait belt    Skin: Scattered bruising and scabs on BUE  R forearm skin tear covered with mepilex    Pain: Pain managed with scheduled meds    CMS: A&Ox4, with baseline numbness and tingling on BLE    Dressing CDI    Diet: Regular diet   LDA: 2 R PIVs   Equipment: Personal items, call light    Plan:  Continue POC: CT today, waiting on placement (SNF)    Additional Info:  Pt refused continuous O2

## 2025-02-05 NOTE — PROGRESS NOTES
Care Management Follow Up    Length of Stay (days): 32    Expected Discharge Date: 02/07/2025     Concerns to be Addressed: discharge planning     Patient plan of care discussed at interdisciplinary rounds: Yes    Anticipated Discharge Disposition: Home, Hospice      Anticipated Discharge Services: Other (see comment) (current on home hospice)  Anticipated Discharge DME: None    Patient/family educated on Medicare website which has current facility and service quality ratings:    Education Provided on the Discharge Plan: Yes  Patient/Family in Agreement with the Plan:  yes    Referrals Placed by CM/SW:    Declined:  [place] [facility]  Ph: [phone]  Fax :[phone]  2/3/25: referral sent  2/4/25: Can't take  any more MA Pending cases    [facility]  [address]  [id number]  P: [phone]  P: [phone] - Admissions  F: [phone]   2/4: Referral sent  2/5: No beds available    [facility]  Ph: [phone]-  2/3/25: VM left with Leila in marketing requesting call  back.  2/5: Only take CADI and Elderly waiver and processing time thru the [facility] is longer than prognosis for length of life.     Private pay costs discussed: Not applicable    Private pay costs discussed: Not applicable    Discussed  Partnership in Safe Discharge Planning  document with patient/family: Yes:     Handoff Completed: No, handoff not indicated or clinically appropriate    Additional Information:  Pt reviewed at rounds.  SW  followed up with referrals. SW  followed up with Pt's daughter to obtain additional referrals. At this time she doesn't have additional referrals and plans to be at bedside tomorrow afternoon around 1PM. JARAD  will plan to meet with Pt and Pt's daughter.     Next Steps:   SW/RNCC to follow for safe discharge planning  and placement.    Antonio Houston, LICSW  [facility]   Ph: [phone]

## 2025-02-05 NOTE — CONSULTS
SPIRITUAL HEALTH SERVICES Consult Note  I met with Harrison to respond to spiritual care consult from Palliative Care. Harrison was dozing when I arrived. He woke up, and declined a conversation both this morning or later this afternoon. His demeanor seemed down and sad as he spoke to me.     Spiritual care will be available while he is here.       Chente Peralta M.Div.  Associate

## 2025-02-05 NOTE — PROGRESS NOTES
Addendum to the progress note.    The patient wanted to talk to the MD again.  I did go and talk to him again and address his concerns.  He was asking questions about whether his lung mass for which she had a right upper lobe radiation treatment has recurred.  He is currently on 1 L of oxygen.  On 1//25 he had a CT of the chest with PE protocol done.  Noted to have a 9 mm left lower lobe nodule which is noted to be waxing and waning and probably due to infectious or inflammatory process.  He asked for a repeat CT scan of the chest.  I did order CT with contrast.  His serum creatinine is within normal limits.  Discussed with RN.  Marce Anderson MD

## 2025-02-05 NOTE — PROGRESS NOTES
"Brief Palliative Note    Chart reviewed.  Patient not seen.  Noted that patient requested primary team to order CT chest to assess whether his lung mass which is status post radiation treatment has recurred.  This has been ordered.    To reiterate/clarify some points from palliative note from yesterday 2/4:  Please consult oncology to help with future planning/prognosis.  Harrison wanted clarity regarding treatment plan/prognosis for metastatic thyroid cancer  Palliative did NOT recommend that Harrison is inappropriate for hospice.  He is very appropriate for hospice but at this time his values are not aligned with hospice type care. Harrison stated yesterday 2/4 that he wants to \"live longer and well\".  Life-sustaining/restorative measures are not compatible with hospice philosophy.    I do have some concern for his decisional capacity.  The last SLUMS score I could find was from 5/2024 and noted that he scored 26/30. Would recommend OT perform MOCA/SLUMS again to reassess.  If he is deemed to have enough cognitive deficit, he may only have partial capacity or may not have capacity and future medical decisions may need to be made in conjunction with his daughter Janey.  Appreciate spiritual health visiting with Harrison. He is clearly lonely but did not want to engage with them.  Please look at pulmonology's note from 2/3 and consider their recommendations (he was pursed lip breathing and looked very dyspneic when I saw him yesterday 2/4). These recs are as follows:  \"- Recommend additional Prednisone 40 mg for 5 days, then 20 for 3 days, then 10 for 3 days and then should be chronically on 5 mg   - Agree with PTA regimen of TreleNehemiah heathbs PRN, and chronic Azithromycin\"    Palliative will chart check again tomorrow 2/6. Please page if needed.     Nathaniel Wilhelm DO / Internal and Palliative Medicine   Securely message with the Vocera Web Console (learn more here)   Text page via [employer] Paging/Directory   "

## 2025-02-05 NOTE — PLAN OF CARE
VS: /63   Pulse 84   Temp 98.2  F (36.8  C) (Oral)   Resp 18   Wt 71 kg (156 lb 8 oz)   SpO2 94%   BMI 23.45 kg/m     O2: 1 LPM via nasal cannula  With dyspnea upon exertion   Output: Continent both bowel and bladder   Last BM: 2-4-2025   Activity: SBA with 4WW  Bed kept on high ta's position, resufed repositioning   Skin: Scattered bruising and scab on BUE   Pain: Managed with scheduled morphine   CMS: A&Ox4, with baseline numbness and tingling on BLE   Dressing: R forearm, dressing changed this shift, CDI   Diet: Regular diet   LDA: PIV on R forearm saline locked   Equipment: Personal items, call light    Plan: Pending MA approval and placement   Additional Info:

## 2025-02-06 VITALS
BODY MASS INDEX: 23.37 KG/M2 | TEMPERATURE: 97.4 F | WEIGHT: 156 LBS | DIASTOLIC BLOOD PRESSURE: 64 MMHG | SYSTOLIC BLOOD PRESSURE: 114 MMHG | HEART RATE: 75 BPM | OXYGEN SATURATION: 97 % | RESPIRATION RATE: 18 BRPM

## 2025-02-06 LAB
ERYTHROCYTE [DISTWIDTH] IN BLOOD BY AUTOMATED COUNT: 18.7 % (ref 10–15)
HCT VFR BLD AUTO: 34.9 % (ref 40–53)
HGB BLD-MCNC: 11.7 G/DL (ref 13.3–17.7)
MCH RBC QN AUTO: 31.8 PG (ref 26.5–33)
MCHC RBC AUTO-ENTMCNC: 33.5 G/DL (ref 31.5–36.5)
MCV RBC AUTO: 95 FL (ref 78–100)
PLATELET # BLD AUTO: 14 10E3/UL (ref 150–450)
PROCALCITONIN SERPL IA-MCNC: 0.11 NG/ML
RBC # BLD AUTO: 3.68 10E6/UL (ref 4.4–5.9)
WBC # BLD AUTO: 7.3 10E3/UL (ref 4–11)

## 2025-02-06 PROCEDURE — 250N000013 HC RX MED GY IP 250 OP 250 PS 637: Performed by: STUDENT IN AN ORGANIZED HEALTH CARE EDUCATION/TRAINING PROGRAM

## 2025-02-06 PROCEDURE — 250N000013 HC RX MED GY IP 250 OP 250 PS 637: Performed by: INTERNAL MEDICINE

## 2025-02-06 PROCEDURE — 99233 SBSQ HOSP IP/OBS HIGH 50: CPT | Performed by: INTERNAL MEDICINE

## 2025-02-06 PROCEDURE — 250N000011 HC RX IP 250 OP 636: Performed by: INTERNAL MEDICINE

## 2025-02-06 PROCEDURE — 999N000157 HC STATISTIC RCP TIME EA 10 MIN

## 2025-02-06 PROCEDURE — 258N000003 HC RX IP 258 OP 636: Performed by: INTERNAL MEDICINE

## 2025-02-06 PROCEDURE — 94640 AIRWAY INHALATION TREATMENT: CPT

## 2025-02-06 PROCEDURE — 36415 COLL VENOUS BLD VENIPUNCTURE: CPT | Performed by: INTERNAL MEDICINE

## 2025-02-06 PROCEDURE — 84145 PROCALCITONIN (PCT): CPT | Performed by: INTERNAL MEDICINE

## 2025-02-06 PROCEDURE — 99233 SBSQ HOSP IP/OBS HIGH 50: CPT | Performed by: STUDENT IN AN ORGANIZED HEALTH CARE EDUCATION/TRAINING PROGRAM

## 2025-02-06 PROCEDURE — 250N000012 HC RX MED GY IP 250 OP 636 PS 637: Performed by: INTERNAL MEDICINE

## 2025-02-06 PROCEDURE — 94640 AIRWAY INHALATION TREATMENT: CPT | Mod: 76

## 2025-02-06 PROCEDURE — 250N000009 HC RX 250

## 2025-02-06 PROCEDURE — 250N000013 HC RX MED GY IP 250 OP 250 PS 637

## 2025-02-06 PROCEDURE — 120N000002 HC R&B MED SURG/OB UMMC

## 2025-02-06 PROCEDURE — 99418 PROLNG IP/OBS E/M EA 15 MIN: CPT | Performed by: STUDENT IN AN ORGANIZED HEALTH CARE EDUCATION/TRAINING PROGRAM

## 2025-02-06 PROCEDURE — 85027 COMPLETE CBC AUTOMATED: CPT | Performed by: INTERNAL MEDICINE

## 2025-02-06 RX ORDER — PREDNISONE 20 MG/1
40 TABLET ORAL DAILY
Status: COMPLETED | OUTPATIENT
Start: 2025-02-06 | End: 2025-02-10

## 2025-02-06 RX ORDER — MORPHINE SULFATE 20 MG/ML
7.5 SOLUTION ORAL
Status: DISCONTINUED | OUTPATIENT
Start: 2025-02-06 | End: 2025-02-18 | Stop reason: HOSPADM

## 2025-02-06 RX ORDER — AZITHROMYCIN 500 MG/5ML
500 INJECTION, POWDER, LYOPHILIZED, FOR SOLUTION INTRAVENOUS EVERY 24 HOURS
Status: DISCONTINUED | OUTPATIENT
Start: 2025-02-06 | End: 2025-02-08

## 2025-02-06 RX ORDER — PREDNISONE 5 MG/1
5 TABLET ORAL DAILY
Status: DISCONTINUED | OUTPATIENT
Start: 2025-02-17 | End: 2025-02-12

## 2025-02-06 RX ORDER — PREDNISONE 5 MG/1
10 TABLET ORAL DAILY
Status: DISCONTINUED | OUTPATIENT
Start: 2025-02-14 | End: 2025-02-12

## 2025-02-06 RX ORDER — PREDNISONE 20 MG/1
20 TABLET ORAL DAILY
Status: DISCONTINUED | OUTPATIENT
Start: 2025-02-11 | End: 2025-02-12

## 2025-02-06 RX ADMIN — IPRATROPIUM BROMIDE AND ALBUTEROL SULFATE 3 ML: .5; 3 SOLUTION RESPIRATORY (INHALATION) at 21:37

## 2025-02-06 RX ADMIN — ASPIRIN 81 MG: 81 TABLET, COATED ORAL at 19:17

## 2025-02-06 RX ADMIN — MORPHINE SULFATE 7.5 MG: 20 SOLUTION ORAL at 18:08

## 2025-02-06 RX ADMIN — FUROSEMIDE 20 MG: 20 TABLET ORAL at 08:50

## 2025-02-06 RX ADMIN — CARBIDOPA AND LEVODOPA 1 TABLET: 25; 100 TABLET ORAL at 13:36

## 2025-02-06 RX ADMIN — GUAIFENESIN 600 MG: 600 TABLET ORAL at 19:17

## 2025-02-06 RX ADMIN — PREDNISONE 2 MG: 1 TABLET ORAL at 08:50

## 2025-02-06 RX ADMIN — IPRATROPIUM BROMIDE AND ALBUTEROL SULFATE 3 ML: .5; 3 SOLUTION RESPIRATORY (INHALATION) at 12:35

## 2025-02-06 RX ADMIN — CARBIDOPA AND LEVODOPA 1 TABLET: 25; 100 TABLET ORAL at 19:16

## 2025-02-06 RX ADMIN — IPRATROPIUM BROMIDE AND ALBUTEROL SULFATE 3 ML: .5; 3 SOLUTION RESPIRATORY (INHALATION) at 07:46

## 2025-02-06 RX ADMIN — Medication 1 HALF-TAB: at 08:50

## 2025-02-06 RX ADMIN — POTASSIUM CHLORIDE 20 MEQ: 1500 TABLET, EXTENDED RELEASE ORAL at 08:49

## 2025-02-06 RX ADMIN — Medication 600 MG: at 08:48

## 2025-02-06 RX ADMIN — Medication 1 HALF-TAB: at 19:17

## 2025-02-06 RX ADMIN — METOPROLOL SUCCINATE 50 MG: 50 TABLET, EXTENDED RELEASE ORAL at 08:50

## 2025-02-06 RX ADMIN — POTASSIUM CHLORIDE 20 MEQ: 1500 TABLET, EXTENDED RELEASE ORAL at 19:17

## 2025-02-06 RX ADMIN — ROSUVASTATIN 10 MG: 10 TABLET, FILM COATED ORAL at 08:50

## 2025-02-06 RX ADMIN — AZITHROMYCIN MONOHYDRATE 500 MG: 500 INJECTION, POWDER, LYOPHILIZED, FOR SOLUTION INTRAVENOUS at 16:49

## 2025-02-06 RX ADMIN — GUAIFENESIN 600 MG: 600 TABLET ORAL at 08:48

## 2025-02-06 RX ADMIN — MORPHINE SULFATE 7.5 MG: 20 SOLUTION ORAL at 21:51

## 2025-02-06 RX ADMIN — MORPHINE SULFATE 5 MG: 20 SOLUTION ORAL at 10:43

## 2025-02-06 RX ADMIN — LEVOTHYROXINE SODIUM 112 MCG: 112 TABLET ORAL at 08:51

## 2025-02-06 RX ADMIN — MORPHINE SULFATE 5 MG: 20 SOLUTION ORAL at 08:47

## 2025-02-06 RX ADMIN — MORPHINE SULFATE 5 MG: 20 SOLUTION ORAL at 13:36

## 2025-02-06 RX ADMIN — ASPIRIN 81 MG: 81 TABLET, COATED ORAL at 08:50

## 2025-02-06 RX ADMIN — FUROSEMIDE 20 MG: 20 TABLET ORAL at 16:49

## 2025-02-06 RX ADMIN — PREDNISONE 40 MG: 20 TABLET ORAL at 14:43

## 2025-02-06 RX ADMIN — CARBIDOPA AND LEVODOPA 1 TABLET: 25; 100 TABLET ORAL at 08:51

## 2025-02-06 RX ADMIN — IPRATROPIUM BROMIDE AND ALBUTEROL SULFATE 3 ML: .5; 3 SOLUTION RESPIRATORY (INHALATION) at 16:04

## 2025-02-06 RX ADMIN — GABAPENTIN 400 MG: 300 CAPSULE ORAL at 08:51

## 2025-02-06 RX ADMIN — Medication 25 MCG: at 08:51

## 2025-02-06 RX ADMIN — Medication 1 HALF-TAB: at 13:36

## 2025-02-06 RX ADMIN — GABAPENTIN 400 MG: 300 CAPSULE ORAL at 13:36

## 2025-02-06 RX ADMIN — GABAPENTIN 400 MG: 300 CAPSULE ORAL at 19:16

## 2025-02-06 RX ADMIN — Medication 1 TABLET: at 08:51

## 2025-02-06 ASSESSMENT — ACTIVITIES OF DAILY LIVING (ADL)
ADLS_ACUITY_SCORE: 42

## 2025-02-06 NOTE — PROGRESS NOTES
Stated had chest pain last night but denies today. Pt states SOB worse today than yesterday. Lung lobes auscultation clear but diminished. Medicine MD paged.

## 2025-02-06 NOTE — PROGRESS NOTES
"Care Management Follow Up    Length of Stay (days): 33    Expected Discharge Date: 02/07/2025     Concerns to be Addressed: discharge planning     Patient plan of care discussed at interdisciplinary rounds: Yes    Anticipated Discharge Disposition: Home or LTC, with Hospice services     Anticipated Discharge Services: Hospice  Anticipated Discharge DME: None    Patient/family educated on Medicare website which has current facility and service quality ratings:  yes  Education Provided on the Discharge Plan: yes  Patient/Family in Agreement with the Plan:      Referrals Placed by CM/SW: Homecare (currently on home hospice)    Additional Information:    , covering for 5 Ortho, attended rounds for patient. Pt is being followed by JARAD Alvarez. Team at rounds was asking that we work to get pt discharged due to pt being med ready and need a facility.     JARAD called and spoke with pt's daughter Janey # [phone]. Janey stated that she is very stressed about this situation and wants to find a facility that is not trash. Janey does not want her Dad to be at any [place] or [facility] facility. Janey is interested in two of the facilities her Dad has stayed at before - [facility] ([place]) and [facility] ([place]). JARAD called and left a VM with [place] on [facility] admissions # [phone]. JARAD also called [facility] admissions # [phone] and spoke to Nichole and mentioned that pt had stayed there before and asked if there were any LTC beds open. Nichole stated there were ($45 extra for a private room), and asked that SW send referral via epic. JARAD sent referral.    Janey also stated that pt's sister (Freeman), who lives in [place], has offered for pt to stay with her in her home. JARAD called and spoke with Freeman # [phone]. Freeman stated that she could have him stay with her, but wants to know more information about his needs, mobility, etc.  Freeman states that she lives in a split level home with 1 KRISHNA. Once in " the home there are 5-6 stairs going up to the main level. The main level would have a bedroom for the pt to stay, in addition to a bathroom, kitchen and living room.     JARAD called SW following pt and updated him on these phone calls and referral made. JARAD stated that MA has been approved as of today, 2/6/25. JARAD plans to meet with pt, Janey and Norbert at 1 pm in pt's room.     Next Steps:   []Call [facility] to follow up on referral placed for LTC # [phone]  []Call pt's sister, Freeman, if considering discharge to her home in [place]. # [phone]  []Follow up with Rachelle on [facility] re: bed availability # [phone]  []SW make other LTC referrals, now that MA has been approved.      KINA Hebert, LGSW  Coverage   [facility]  [email or web address]

## 2025-02-06 NOTE — PLAN OF CARE
Goal Outcome Evaluation:           Overall Patient Progress: no changeOverall Patient Progress: no change       Morphine dose increased today.     VS: /64   Pulse 68   Temp 98.3  F (36.8  C) (Oral)   Resp 20   Wt 70.8 kg (156 lb)   SpO2 95%   BMI 23.37 kg/m     Stated had chest pain last night but denies today. Pt states SOB worse today than yesterday.    O2: 2 LMP O2 via nasal cannula   Output: Continent of bowel and bladder   Last BM: 2/4/25   Activity: SBA w/ 4WW, refused to use gait belt   Skin: Scattered bruising and scabs on BUE  R forearm skin tear covered with mepilex; new dressing applied today    Pain: Pain managed with scheduled meds   CMS: A&Ox4, baseline numbness and tingling on BLE   Dressing: R forearm dressing-CDI   LDA:  none   Diet: Regular diet   Equipment: Call light, personal items   Plan: Continue POC: IV antibiotics   Additional Info: Pt refusing continuous O2 monitoring    Critical lab: platelets 14.

## 2025-02-06 NOTE — PLAN OF CARE
VS: /61 (BP Location: Left arm)   Pulse 79   Temp 98.4  F (36.9  C) (Oral)   Resp 20   Wt 71.1 kg (156 lb 11.2 oz)   SpO2 98%   BMI 23.48 kg/m     O2: 1 LMP O2 via nasal cannula  SOB with exertion   Output: Continent of bowel and bladder   Last BM: 2/4/25   Activity: SBA w/ 4WW, refused to use gait belt  Bed in high ta's position, refused repositioning   Skin: Scattered bruising and scabs on BUE  R forearm skin tear covered with mepilex   Pain: Chronic back pain managed with scheduled meds   CMS: A&Ox4, baseline numbness and tingling on BLE   Dressing: R forearm dressing-CDI  Preventative sacral mepilex changed   Diet: Regular diet   Equipment: Call light, personal items   Plan: Continue POC: CT completed. Waiting on placement (SNF)   Additional Info: Pt refusing continuous O2 monitoring

## 2025-02-06 NOTE — PROGRESS NOTES
Care Management Follow Up    Length of Stay (days): 33    Expected Discharge Date: 02/09/25     Concerns to be Addressed: discharge planning     Patient plan of care discussed at interdisciplinary rounds: Yes    Anticipated Discharge Disposition: Home, Hospice      Anticipated Discharge Services: Other (see comment) (current on home hospice)  Anticipated Discharge DME: None    Patient/family educated on Medicare website which has current facility and service quality ratings:    Education Provided on the Discharge Plan: Yes  Patient/Family in Agreement with the Plan:      Referrals Placed by CM/SW: Declined:  [facility]  Ph: [phone]  Fax :[phone]  2/3/25: referral sent  2/4/25: Can't take  any more MA Pending cases  2/6: SW re-sent referral now that MA  is active.      [facility]  [address] [place] [address]  P: [phone]  P: [phone] - Admissions  F: [phone]   2/6: referral sent  and being reviewed  for  a semi-private  room    [facility] - [address] [address]   P: [phone]  P: [phone] - Admissions   P: [phone] - Admissions old #?  F: [phone]   2/6: referral sent    [facility]  Ph: [phone]  Fax: [phone] or in-basket  2/6/26: Initial referral sent    [facility] [address] [place] [address]  P: [phone]   P: [phone] - Admission   F: [phone]   2/6: initial referral by phone-they will review in EPIC for  a semi-private room    [facility]  [address]  [address]  P: [phone]  P: [phone] - Admissions  F: [phone]   2/4: Referral sent  2/5: No beds available     [facility]  Ph: [phone]-  2/3/25: VM left with Leila in marketing requesting call  back.  2/5: Only take CADI and Elderly waiver and processing time thru the county is longer than prognosis for  "length of life.     [facility]  Ph: [phone]  Fax: [phone]  2/6: Referral sent and declined  d/t no bed  availability.    [facility] (P: [phone], F: [phone])   2/6: Referral  sent-declined-based on Pt's needs and the acuity  of  other pt's on the unit they feel  they cannot meet his needs at this time.    Private pay costs discussed: Not applicable    Discussed  Partnership in Safe Discharge Planning  document with patient/family: Yes:     Handoff Completed: No, handoff not indicated or clinically appropriate    Additional Information:  SW received VM from Pt's  daughter indicating that [facility] would  be a facility for SW to send referral to. SW sent several referrals above based on conversation with Pt's daughter and collateral passed on by SW  covering all of 5 Ortho today who spoke with Pt's daughter as well. SW and Palliative  met with Pt and Pt's daughter. Pt identified  that he feels he is being rushed and  forced or  \"kicked out\" of the hospital before he is ready. SW identified that the  goal was to find a Long Term Care(LTC) facility where Pt can receive cares, have the option for greater peer interaction and not be as isolated as Pt is in the hospital. SW identified that a LTC discharge  did into mean Pt needed to initiate hospice services immediately and identified these can be added  at  a later time. Pt states that he wants his pneumonia treated in the hospital. Pt identified that his understanding is that IV-antibiotics are better/more  effective than oral ones and he doesn't want to be discharged  with Oral antibiotics when his  current infection can be treated in the hospital.  SW and Palliative validated these statements and identified that they will pass it on to the attending provider. SW and Palliative spoke  with the attending  who identified he will follow up with Pt.      Next Steps:   SW/RNCC will follow up on referrals     Antonio " Celso, Dorothe[facility] Dix Psychiatric CenterSW  [facility]   Ph: [phone]

## 2025-02-06 NOTE — PROGRESS NOTES
[id number]    VS: BP 94/62 (BP Location: Left arm)   Pulse 82   Temp 98.4  F (36.9  C) (Oral)   Resp 20   Wt 71.1 kg (156 lb 11.2 oz)   SpO2 98%   BMI 23.48 kg/m     O2: Stable on o2 via nasal canula Denies SOB   Output: Continent of bowel and bladder, voids spontaneously w/o difficulty   Last BM: 2-4   Activity: SBA with personal walker   Skin: X Scattered Bruises   Pain: Denies              CMS: A&Ox4 denies chest pain, n/v, numbness/tingling, and dizziness   Dressing:    Diet: Regular   LDA: PIVS SL   Equipment: Personal belongings   Plan: Call light in reach, continue POC   Additional Info:

## 2025-02-06 NOTE — PROGRESS NOTES
PALLIATIVE CARE PROGRESS NOTE  [facility]     Patient Name: Harrison Thomas  Date of Admission: 1/4/2025   Today the patient was seen for: Follow up goals, symptom management     Recommendations & Counseling     GOALS OF CARE:   Plan of care - Life-prolonging with limits (DNR/DNI) - desiring to be discharged to LTC without hospice services   Met with Harrison, daughter Janey, unit SW, and palliative. Palliative notified primary team about the meeting but unfortunately it was last minute and primary did not have time to attend.  Noted that Harrison has metastatic thyroid cancer and now is suffering from progressive and likely end-stage COPD needing O2 chronically as well as suffering from deconditioning and dyspnea and likely an element of radiation pneumonitis.  Harrison and Janey voiced understanding. Harrison was also able to say that he was told he may not get much better.  Spoke with him again about living longer (life-sustaining) or living well (hospice) pathway.   Harrison states that he wants to live longer. He states he is okay coming back to the hospital if needed.  His main goal is to ensure that any infections have been treated prior to discharge. He is agreeable to long-term care placement and appreciate unit SW for briefing him and Janey about the process. Assured him that we will tom him up as best as possible.   Further noted given his underlying lung disease burden, he is at high risk for readmission and we do not know how long he will stay out of the hospital and he will absolutely need to be readmitted at some point. He voiced understanding.  During my conversation with him, Harrison seemed to answered questions faster and in a more appropriate manner than my visit a few days prior. I do not think an urgent SLUMS/MOCA is necessary as he seemed to have enough decisional capacity.     ADVANCE CARE PLANNING:  Patient has an advance directive dated 3/13/2023.  Primary  "Health Care Agent Adeola Thomas - spouse but currently divorce.  Alternate(s) daughter- Janey.    He stated that he did not want to involve his (ex-)wife any longer and asked that daughter Janey to support him with decision making. As such, Janey has been serving as surrogate decision maker with input from Harrison as kelly in setting of fluctuating mentation  Appreciate unit SW helping with filling out a new HCD  There is a POLST form on file, but will need to be updated prior to discharge.  Code status: No CPR- Do NOT Intubate    MEDICAL MANAGEMENT: all med recs for 2/6 done for you   **Important to family to manage symptoms while also maximizing alertness in order to continue to interact with loved ones.    #Pain,chronic  #Dyspnea,COPD  #Secretions  #Lethargy, fluctuating mentation  -Mentation clear and answering questions more appropriately and more rapidly and did not get stuck in conversation in a circular manner.  -CT Chest w/o contrast 2/6 showed \"Increased left greater than right lower lobe peribronchovascular consolidative and groundglass opacities with tree-in-bud nodularity, which may be secondary to an infectious/inflammatory etiology,  including aspiration pneumonitis versus atypical pneumonia. Recommend  follow-up chest CT in 3 months.Bilateral sub-6 mm solid pulmonary nodules. Cholelithiasis without evidence of acute cholecystitis.\"  Mucinex 600 mg BID scheduled  Trelegy daily  Duonebs QID scheduled and q4h prn  Saline nebs prn  Fan at bedside - doesn't like air blowing on his face. OK to not use   Supplemental O2.  INCREASE Morphine 5mg to 7.5mg SL 5 times daily + continue 5-10mg q2h PRN as   Lorazepam 0.25 mg q6h PRN  Diuresis per primary team   Pulm recs 2/4 - \"Prednisone 40 mg for 5 days, then 20 for 3 days, then 10 for 3 days and then should be chronically on 5 mg, agree with Trelegy, Duonebs PRN, and chronic Azithromycin, Roflumilast as outpatient consideration\"  Low concern for infection " despite CT scan read. Can consider changing Azithromycin from po to IV for a few days to appease Harrison's worries as he has a fixed belief that all IV abx are better than oral.   Appreciate pharmacy liaison consult for [employer]/Bel[employer] coverage 2/3. PAs approved.  Butrans 10/20 @ ~$158/314 per month   Belbuca 150/300 @ $452/628 month.  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  Family perceives Harrison is increasingly anxious but doesn't often recognize this himself.   Gabapentin  as above  Robaxin as above  Lorazepam 0.25 mg q6h PRN - encourage use for anxiety and/or dyspnea  Consider selective serotonin reuptake inhibitor or SNRI for mood, will defer to primary team and/or outpatient provider    PSYCHOSOCIAL/SPIRITUAL:  Family - lives at home with daughter Janey  [facility] - [facility]arsani    Goals are clear. Symptom recs have been made. Palliative Care will sign off. Outpatient referral has been placed. Please reconsult if needed.    Total time spent was 80 minutes regarding goals of care and support and symptom control on the date of the encounter. These recommendations were given to the primary team via this note/in-person.    Nathaniel Wilhelm DO / Internal and Palliative Medicine   Securely message with the Vocera Web Console (learn more here)   Text page via [employer] Paging/Directory        Assessment          Harrison Thomas is a 79 year old male with a past medical history of thyroid cancer with lung metastases, parkinson's disease, neuropathy, glaucoma, osteoporosis, chronic pain, severe COPD, HLD,  BRENNEN, CAD who presented on 1/4 with a 7 day history of productive cough and 1 day history of weakness. Upon admission CT pulmonary angio done and was negative for PE, treated for COPD exacerbation and PNA. Prior to admission he enrolled in hospice however presented to the hospital after being unable to reach hospice and then needed to call 911.     Palliative was consulted for goals of care and symptom control on 1/6/2025.  Signed off on 1/24/2025.        Interval History:      Multidisciplinary collaboration:  Chart reviewed.  Did not receive/ask for any as needed medications but did receive scheduled medications including Roxanol 5 mg sublingual 5 times daily scheduled from 0700 to 0700.    Patient/family narrative  Seen and examined at bedside. Pulm saw and made some recs. Visit as above.      Review of Systems:     >4 ROS negative unless stated otherwise above        Physical Exam:   Temp:  [98.3  F (36.8  C)-98.4  F (36.9  C)] 98.3  F (36.8  C)  Pulse:  [68-82] 68  Resp:  [20] 20  BP: ()/(54-64) 126/64  SpO2:  [95 %-99 %] 95 %  156 lbs 0 oz    Physical Exam  Constitutional: sitting up in bed, NAD   Lungs: +tachypneic, +increased work of breathing but able to eat and speak in short phrases and generally improved from days prior, less upper airway secretions noted.  Abdomen: non-distended  Neurologic: alert and maintained alertness throughout visit but query if he has full capacity  Skin: No rashes, erythema        Data Reviewed:     Imaging  EXAM: CT chest without intravenous contrast. 2/5/2025 5:08 PM   IMPRESSION:  1. Increased left greater than right lower lobe peribronchovascular consolidative and groundglass opacities with tree-in-bud nodularity, which may be secondary to an infectious/inflammatory etiology, including aspiration pneumonitis versus atypical pneumonia. Recommend follow-up chest CT in 3 months.  2. Bilateral sub-6 mm solid pulmonary nodules. Recommend attention on follow-up.  3.  Cholelithiasis without evidence of acute cholecystitis.      Labs  CMP  Recent Labs   Lab 02/05/25  0532 02/04/25  0611 02/03/25  0556 02/01/25  0608    143 146*  --    POTASSIUM 4.2 3.8 4.5 3.4   CHLORIDE 102 101 103  --    CO2 31* 32* 33*  --    ANIONGAP 10 10 10  --    GLC 89 87 77  --    BUN 19.1 18.2 18.2  --    CR 0.88 0.84 0.87  --    GFRESTIMATED 89 90 89  --    KARLIE 9.4 9.1 9.1  --      CBC  Recent Labs   Lab 02/06/25  1109 01/31/25  0808   WBC 7.3 5.9   RBC 3.68* 3.29*   HGB 11.7* 10.3*   HCT 34.9* 30.8*   MCV 95 94   MCH 31.8 31.3   MCHC 33.5 33.4   RDW 18.7* 16.5*   PLT 14* 85*     INRNo lab results found in last 7 days.  Arterial Blood GasNo lab results found in last 7 days.

## 2025-02-07 LAB
ANION GAP SERPL CALCULATED.3IONS-SCNC: 10 MMOL/L (ref 7–15)
BUN SERPL-MCNC: 19.2 MG/DL (ref 8–23)
CALCIUM SERPL-MCNC: 9.3 MG/DL (ref 8.8–10.4)
CHLORIDE SERPL-SCNC: 101 MMOL/L (ref 98–107)
CREAT SERPL-MCNC: 0.81 MG/DL (ref 0.67–1.17)
EGFRCR SERPLBLD CKD-EPI 2021: >90 ML/MIN/1.73M2
GLUCOSE SERPL-MCNC: 142 MG/DL (ref 70–99)
HCO3 SERPL-SCNC: 28 MMOL/L (ref 22–29)
POTASSIUM SERPL-SCNC: 4.3 MMOL/L (ref 3.4–5.3)
SODIUM SERPL-SCNC: 139 MMOL/L (ref 135–145)

## 2025-02-07 PROCEDURE — 82435 ASSAY OF BLOOD CHLORIDE: CPT | Performed by: INTERNAL MEDICINE

## 2025-02-07 PROCEDURE — 250N000013 HC RX MED GY IP 250 OP 250 PS 637: Performed by: INTERNAL MEDICINE

## 2025-02-07 PROCEDURE — 94640 AIRWAY INHALATION TREATMENT: CPT

## 2025-02-07 PROCEDURE — 250N000012 HC RX MED GY IP 250 OP 636 PS 637: Performed by: INTERNAL MEDICINE

## 2025-02-07 PROCEDURE — 36415 COLL VENOUS BLD VENIPUNCTURE: CPT | Performed by: INTERNAL MEDICINE

## 2025-02-07 PROCEDURE — 250N000013 HC RX MED GY IP 250 OP 250 PS 637

## 2025-02-07 PROCEDURE — 250N000009 HC RX 250

## 2025-02-07 PROCEDURE — 250N000011 HC RX IP 250 OP 636: Performed by: INTERNAL MEDICINE

## 2025-02-07 PROCEDURE — 250N000009 HC RX 250: Performed by: INTERNAL MEDICINE

## 2025-02-07 PROCEDURE — 250N000013 HC RX MED GY IP 250 OP 250 PS 637: Performed by: STUDENT IN AN ORGANIZED HEALTH CARE EDUCATION/TRAINING PROGRAM

## 2025-02-07 PROCEDURE — 80048 BASIC METABOLIC PNL TOTAL CA: CPT | Performed by: INTERNAL MEDICINE

## 2025-02-07 PROCEDURE — 120N000002 HC R&B MED SURG/OB UMMC

## 2025-02-07 PROCEDURE — 99233 SBSQ HOSP IP/OBS HIGH 50: CPT | Performed by: INTERNAL MEDICINE

## 2025-02-07 PROCEDURE — 258N000003 HC RX IP 258 OP 636: Performed by: INTERNAL MEDICINE

## 2025-02-07 PROCEDURE — 82565 ASSAY OF CREATININE: CPT | Performed by: INTERNAL MEDICINE

## 2025-02-07 PROCEDURE — 999N000157 HC STATISTIC RCP TIME EA 10 MIN

## 2025-02-07 PROCEDURE — 94640 AIRWAY INHALATION TREATMENT: CPT | Mod: 76

## 2025-02-07 RX ADMIN — CARBIDOPA AND LEVODOPA 1 TABLET: 25; 100 TABLET ORAL at 08:14

## 2025-02-07 RX ADMIN — CARBIDOPA AND LEVODOPA 1 TABLET: 25; 100 TABLET ORAL at 13:55

## 2025-02-07 RX ADMIN — FUROSEMIDE 20 MG: 20 TABLET ORAL at 15:28

## 2025-02-07 RX ADMIN — IPRATROPIUM BROMIDE AND ALBUTEROL SULFATE 3 ML: .5; 3 SOLUTION RESPIRATORY (INHALATION) at 20:50

## 2025-02-07 RX ADMIN — IPRATROPIUM BROMIDE AND ALBUTEROL SULFATE 3 ML: .5; 3 SOLUTION RESPIRATORY (INHALATION) at 16:22

## 2025-02-07 RX ADMIN — MORPHINE SULFATE 7.5 MG: 20 SOLUTION ORAL at 22:32

## 2025-02-07 RX ADMIN — MORPHINE SULFATE 7.5 MG: 20 SOLUTION ORAL at 11:27

## 2025-02-07 RX ADMIN — GABAPENTIN 400 MG: 300 CAPSULE ORAL at 13:54

## 2025-02-07 RX ADMIN — POTASSIUM CHLORIDE 20 MEQ: 1500 TABLET, EXTENDED RELEASE ORAL at 08:12

## 2025-02-07 RX ADMIN — GABAPENTIN 400 MG: 300 CAPSULE ORAL at 08:13

## 2025-02-07 RX ADMIN — METOPROLOL SUCCINATE 50 MG: 50 TABLET, EXTENDED RELEASE ORAL at 08:14

## 2025-02-07 RX ADMIN — PREDNISONE 40 MG: 20 TABLET ORAL at 08:14

## 2025-02-07 RX ADMIN — Medication 1 TABLET: at 08:13

## 2025-02-07 RX ADMIN — IPRATROPIUM BROMIDE AND ALBUTEROL SULFATE 3 ML: .5; 3 SOLUTION RESPIRATORY (INHALATION) at 07:33

## 2025-02-07 RX ADMIN — IPRATROPIUM BROMIDE AND ALBUTEROL SULFATE 3 ML: .5; 3 SOLUTION RESPIRATORY (INHALATION) at 11:52

## 2025-02-07 RX ADMIN — ROSUVASTATIN 10 MG: 10 TABLET, FILM COATED ORAL at 08:13

## 2025-02-07 RX ADMIN — LEVOTHYROXINE SODIUM 112 MCG: 112 TABLET ORAL at 08:13

## 2025-02-07 RX ADMIN — GABAPENTIN 400 MG: 300 CAPSULE ORAL at 20:06

## 2025-02-07 RX ADMIN — GUAIFENESIN 600 MG: 600 TABLET ORAL at 08:13

## 2025-02-07 RX ADMIN — Medication 1 HALF-TAB: at 13:54

## 2025-02-07 RX ADMIN — IPRATROPIUM BROMIDE AND ALBUTEROL SULFATE 3 ML: .5; 3 SOLUTION RESPIRATORY (INHALATION) at 18:56

## 2025-02-07 RX ADMIN — Medication 600 MG: at 08:12

## 2025-02-07 RX ADMIN — MORPHINE SULFATE 7.5 MG: 20 SOLUTION ORAL at 08:14

## 2025-02-07 RX ADMIN — CARBIDOPA AND LEVODOPA 1 TABLET: 25; 100 TABLET ORAL at 20:06

## 2025-02-07 RX ADMIN — FUROSEMIDE 20 MG: 20 TABLET ORAL at 08:14

## 2025-02-07 RX ADMIN — POTASSIUM CHLORIDE 20 MEQ: 1500 TABLET, EXTENDED RELEASE ORAL at 20:06

## 2025-02-07 RX ADMIN — Medication 1 HALF-TAB: at 08:14

## 2025-02-07 RX ADMIN — GUAIFENESIN 600 MG: 600 TABLET ORAL at 20:06

## 2025-02-07 RX ADMIN — Medication 1 HALF-TAB: at 20:06

## 2025-02-07 RX ADMIN — Medication 25 MCG: at 08:13

## 2025-02-07 RX ADMIN — MORPHINE SULFATE 7.5 MG: 20 SOLUTION ORAL at 18:18

## 2025-02-07 RX ADMIN — AZITHROMYCIN MONOHYDRATE 500 MG: 500 INJECTION, POWDER, LYOPHILIZED, FOR SOLUTION INTRAVENOUS at 15:30

## 2025-02-07 RX ADMIN — MORPHINE SULFATE 7.5 MG: 20 SOLUTION ORAL at 13:55

## 2025-02-07 RX ADMIN — ASPIRIN 81 MG: 81 TABLET, COATED ORAL at 08:13

## 2025-02-07 ASSESSMENT — ACTIVITIES OF DAILY LIVING (ADL)
ADLS_ACUITY_SCORE: 42

## 2025-02-07 NOTE — PROGRESS NOTES
Care Management Follow Up    Length of Stay (days): 34    Expected Discharge Date: 02/09/2025     Concerns to be Addressed: discharge planning     Patient plan of care discussed at interdisciplinary rounds: Yes    Anticipated Discharge Disposition: LTC, hospice      Anticipated Discharge Services: Other (see comment) (current on home hospice)  Anticipated Discharge DME: None    Patient/family educated on Medicare website which has current facility and service quality ratings:    Education Provided on the Discharge Plan: Yes  Patient/Family in Agreement with the Plan:  Yes    Referrals Placed by CM/SW:   Referrals Placed by CM/SW: Declined:  [place] [facility] [place]  Ph: [phone]  Fax :[phone]  2/3/25: referral sent  2/4/25: Can't take  any more MA Pending cases  2/6: SW re-sent referral now that MA  is active.   2/7: DON reviews  these and went  home  sick today. Will try again Monday if Pt  is still here.      [facility]  [address] [place] [address]  P: [phone]  P: [phone] - Admissions  F: [phone]   2/6: referral sent  and being reviewed  for  a semi-private  room  2/7: left VM, messaged in-basket, waiting to hear  back     [facility] [address] [place] [address]  P: [phone]   P: [phone] - Admission   F: [phone]   2/6: initial referral by phone-they will review in EPIC for  a semi-private room  2/7: will review and call SW back.     Declined  [facility]  [address] [place]  [address]  P: [phone]  P: [phone] - Admissions  F: [phone]   2/4: Referral sent  2/5: No beds available     [facility]  Ph: [phone]-  2/3/25: VM left with Leila in marketing requesting call  back.  2/5: Only take CADI and Elderly waiver and processing time thru the county is longer than prognosis for length of life.      [facility]  Ph: [phone]  Fax: [phone]  2/6: Referral sent and declined   d/t no bed  availability.     [facility] (P: [phone], F: [phone])   2/6: Referral  sent-declined-based on Pt's needs and the acuity  of  other pt's on the unit they feel  they cannot meet his needs at this time.      [facility]  Ph: [phone]  Fax: [phone] or in-basket  2/6/26: Initial referral sent  2/7: No Bed available.      [facility]  [address] [address]   P: [phone]  P: [phone] - Admissions   P: [phone] - Admissions old #?  F: [phone]   2/6: referral sent  2/7: Private pay only over $500 per day.   Private pay costs discussed: Not applicable    Discussed  Partnership in Safe Discharge Planning  document with patient/family: Yes:     Handoff Completed: No, handoff not indicated or clinically appropriate    Additional Information:  SW followed up with LTC facilities. [facility] accepted however, they are private pay only of $500 per day which Pt and family cannot afford. Manny Ulloa is reviewing Pt and will castro SW back. Mauricio Gutierrez says that the DON reviews LTC referrals and went  home sick for the day. [facility] is not responding to SW attempts to follow up either via in-basket  or phone and SW left 1 VM yesterday and 1 today. SW  will continue to follow for safe discharge planning and placement.      Next Steps:   SW/RNCC will continue to follow for safe discharge placement and planning.   Antonio Houston, LICSW  [facility] ICU & [facility]   Ph: [phone]

## 2025-02-07 NOTE — PROGRESS NOTES
"[facility]    Medicine Progress Note - Hospitalist Service, GOLD TEAM 18    Date of Admission:  1/4/2025    Assessment & Plan     Patient is a 78 y/o man who has a past medical history significant for thyroid cancer with lung metastases, Parkinson's disease, neuropathy, glaucoma, osteoporosis, chronic pain, severe COPD, hyperlipidemia, obstructive sleep apnea and coronary artery disease. Patient presented on 1/04/2025 with a 7 day history of productive cough and a 1 day history of weakness. CT pulmonary angiogram was negative for pulmonary embolism. Patient as admitted with  COPD exacerbation  and possible  community-acquired pneumonia as well.      Patient reportedly saw Pulmonology in Oct-2024 and COPD was moderately well-controlled at that time. Activity was apparently limited much more by his balance and parkinsonism than by respiratory status. Patient has antibiotics and prednisone available for outpatient COPD flares but apparently has not recently used this.      Patient apparently just entered hospice in the past week prior to hospital admission. Patient stated on admission that he was too weak and he and his daughter called their hospice agency and 911 was called and he was brought into the hospital. When provider asked on day of presentation whether patient wished for hospitalization and treatment of his potential infection, patient stated that he was seeking treatment and reported that his goal was to \"be able to do things for myself\". It was discussed that hospice typically focused on comfort rather than diagnostic testing and aggressive medical treatment. Patient acknowledged this but had seemed unsure of what his true desires for his care are moving forward.      Patient was on ceftriaxone from 1/04 - 1/08.  azithromycin 500 mg from 1/05-1/07-,  zosyn 1/09-1/ 14- now on every other day       Patient's overall prognosis is poor. Patient has been seen by " palliative care      2/7  No acute events over night. Able to ambulate by himself with a walker to the bathroom. Does not want to wear oxygen when going to .   Patient was continued to have shortness of breath.  Palliative care had seen the patient and patient did not want to be DNR at this moment and wanted to pursue aggressive restorative therapy.  I did start him on tapering dose of steroid as recommended starting with 40 mg for 3 days and 20 mg for 5 days and 10 mg for 3 days then on prednisone 5 mg daily afterwards  Patient is noted to have low platelet count  No any bleeding diathesis.  Pneumonia, CT showed bilateral opacities which is suspicious for atypical bacterial pneumonia.  Patient has been on azithromycin 500 mg daily as suggested by pulmonary medicine.  However patient felt that he might benefit from IV antibiotic therapy.  Discussed with palliative care about the goals of care discussion.  Patient wanted to pursue aggressive restorative therapy.  I elected to give him azithromycin 500 mg IV for a few more days and switch back to the oral  Sepsis   - completed antibiotics  - on admission felt to have sepsis   - Patient initially was treated for a possible community-acquired pneumonia on admission with ceftriaxone and azithromycin.  - Patient subsequently was treated for possible hospital-acquired pneumonia with zosyn.     COPD exacerbation, O2 and steroid dependent   Emphysema   Chronic hypoxemic respiratory failure  - shortness of breath  with minimal exertion  - sees pulmonary as out patient , has not been interested in pulmonary rehab   - recent prolonged hospitalization with COPD exacerbation    -PFTs of 3/24/2023 showing FEV1/FVC of 1.3/2.4 (46/65% predicted, respectively) for ratio 54%. TLC is reduced at 5.1 (74% predicted) and DLCO uncorrected is very low at 10.1 (41% predicted).   - was on prednisone 40 mg daily from 1/04/2025 to 1/08/2025. Patient received prednisone 20 mg once on 1/09-  "then  methylprednisolone 40 mg IV every 8 hours from 1/09- 1/13 and is no longer on steroids  - 1/28  started prednisone 40 mg x 5 days then 2 mg daily as he had been on this  those for a while prior and suspect he is steroid dependent , state she was taking the prednisone 2 mg pre admission   - uses Trelegy and getting it here   - on O2 1-2 litres at baseline.  - CT pulmonary angio 1/4 had no pulmonary embolism  . \"Waxing and waning pulmonary nodularity with a new 8 mm nodular focus in the left lower lobe. Findings are likely secondary to a chronic infectious/inflammatory process including aspiration. Consider short-term follow-up CT in one month for reassessment. 3. Additional sub-6 mm pulmonary nodules are stable compared to prior. 4. Moderate to severe emphysematous changes of the lung parenchyma with chronic fibrotic changes along the right minor fissure.\"  - Patient patient's daughter, patient may be placed on BiPAP if needed and per daughter he had been on BIPAP intermittently that helped with his breathing   - suspect has element of pulmonary hypertension , on lasix, did increase to bid 1/30       Coronary artery disease with past MI s/p stent Cx  1997  placement  HDL   - at time of initiall stent placement he was noted to have 80-90% lesion in nondominant RCA  and 50 % LAD that was treated medically   - Patient usually on aspirin and crestor , restarted aspirin   - Patient continuing metoprolol succinate 50 mg daily.       Hypertension  - continue PTA metoprolol succinate.  - continue lasix , increased to bid as above 1/30 , also increased Kcl     History of traumatic  subdural hemorrhage in 2023  - since resolved per NS follow up      Parkinson's disease  - recently diagnosed   -Has seen neurology and on sinemet and continue     Frequent falls  - with left femur fracture and inf pubic rami fracture, also history rib fracture        Metastatic thyroid cancer (multifocal papillary carcinoma)   Status post  " thyroidectomy 2021   also received radioactive iodine   - had lymph node metastases,.  - Further surveillance as outpatient.  - continue levothyroxine         Right upper lobe lung mass,  s/p radiation therapy with possible radiation pneumonitis:  -  had previous VATS biopsy in Feb-2022 that was non-diagnostic.     New lung nodule in left lower lobe  - follow up  pulmonary         Thrombocytopenia  -  last plt count was 72K 1/14, then went to  54K ---59K ---85K, was low on admission  - he denies alcohol consumption  - cont to monitor   - restarted ASA    Platelet count dropped to 14,000 on 12/16.  Aspirin 81 mg held due to severe thrombocytopenia.  There is no any bleeding diathesis at this moment.       Rheumatoid arthritis with positive rheumatoid factor:  - Per chart review, patient had once been on methotrexate and prednisone but had stopped these medications a few years ago.  - Patient to f/u as outpatient if desired.     Osteopenia   -had been on calcium and vit D       History of immunodeficency  - IG subclass 2      Hypernatremia  - improved:      Neuropathy   chronic pain syndrome:  - Patient currently on gabapentin 400 mg three times a day.  --his  PTA robaxin 500mg TID has been on hold   -Hold PTA trazadone 50mg tab QHS  -Continue PTA oxycodone 5mg Q6H for pain      Glaucoma:  Dry eyes    - Patient not currently on medication for this.  - Patient to f/u with Ophthalmology as outpatient if further treatment is desired.       osteoporosis  h/o right femoral neck fracture s/p right total hip replacement   -hold PTA denosumab 60mg injection every 6 months (last 10/25/2024)  -restarted  PTA calcium , vitamin D  - prolia q 6 mo          Moderate malnutrition in the context of acute vs. chronic illness:  - Supplementing as able.   Cognitive impairment.  Palliative care is questioning patient's decision-making capacity and recommending OT consult for SLUMS/MOCA     Diet: Regular Diet Adult Thin Liquids (level  0)  Snacks/Supplements Adult: Ensure Enlive; With Meals    DVT Prophylaxis: consider subcutaneous heparin   Mir Catheter: Not present  Lines: None     Cardiac Monitoring: None  Code Status: No CPR- Do NOT Intubate      Clinically Significant Risk Factors                 # Thrombocytopenia: Lowest platelets = 14 in last 2 days, will monitor for bleeding   # Hypertension: Noted on problem list     # Acute Hypoxic Respiratory Failure: Documented O2 saturation < 90%. Continue supplemental oxygen as needed          # Moderate Malnutrition: based on nutrition assessment    # Financial/Environmental Concerns:           Social Drivers of Health    Tobacco Use: Medium Risk (10/18/2024)    Patient History     Smoking Tobacco Use: Former     Smokeless Tobacco Use: Never   Physical Activity: Inactive (10/14/2024)    Exercise Vital Sign     Days of Exercise per Week: 0 days     Minutes of Exercise per Session: 0 min   Stress: Stress Concern Present (10/14/2024)    [employer] - Occupational Stress Questionnaire     Feeling of Stress : Very much   Social Connections: Unknown (10/14/2024)    Social Connection and Isolation Panel [NHANES]     Frequency of Social Gatherings with Friends and Family: More than three times a week          Disposition Plan   Discharge planning based on progress.             Marce Anderson MD  Hospitalist Service, [facility] 18  [facility]  Securely message with [employer] (more info)  Text page via [facility] Paging/Directory   See signed in provider for up to date coverage information  ______________________________________________________________________    Interval History   He  cannot really tell if breathing is better, he seems to be speaking a bit better but still seems to have significant shortness of breath  , when not talking breathing is calm   Remains afebrile.  No any vomiting or diarrhea    Current Facility-Administered  Medications:     acetaminophen (TYLENOL) tablet 650 mg, 650 mg, Oral, Q4H PRN, David Saba MD    amLODIPine (NORVASC) tablet 5 mg, 5 mg, Oral, At Bedtime, Samantha Niño MD, 5 mg at 01/31/25 2237    [Held by provider] aspirin EC tablet 81 mg, 81 mg, Oral, BID, Opal Bowman MD, 81 mg at 02/07/25 0813    azithromycin (ZITHROMAX) 500 mg in  mL intermittent infusion, 500 mg, Intravenous, Q24H, Marce Anderson MD, 500 mg at 02/06/25 1649    [Held by provider] azithromycin (ZITHROMAX) tablet 500 mg, 500 mg, Oral, Every Other Day, Samantha Niño MD, 500 mg at 02/05/25 0843    calcium carbonate (OS-KARLIE) tablet 600 mg, 600 mg, Oral, Daily, Opal Bowman MD, 600 mg at 02/07/25 0812    carbidopa-levodopa (SINEMET)  MG per tablet 1 tablet, 1 tablet, Oral, TID, 1 tablet at 02/07/25 0814 **AND** carbidopa-levodopa half-tab 12.5-50 mg, 1 half-tab, Oral, TID, Veronica Jauregui MD, 1 half-tab at 02/07/25 0814    carboxymethylcellulose PF (REFRESH PLUS) 0.5 % ophthalmic solution 1 drop, 1 drop, Both Eyes, Q1H PRN, Veronica Jauregui MD, 1 drop at 01/25/25 1945    Fluticasone-Umeclidin-Vilanterol (TRELEGY ELLIPTA) 200-62.5-25 MCG/ACT oral inhaler 1 puff, 1 puff, Inhalation, Daily, Gonzalez Watt MD, 1 puff at 02/07/25 0802    furosemide (LASIX) tablet 20 mg, 20 mg, Oral, BID, Opal Bowman MD, 20 mg at 02/07/25 0814    gabapentin (NEURONTIN) capsule 400 mg, 400 mg, Oral, TID, David Saba MD, 400 mg at 02/07/25 0813    guaiFENesin (MUCINEX) 12 hr tablet 600 mg, 600 mg, Oral, BID, Tino Wilhelm MD, 600 mg at 02/07/25 0813    ipratropium - albuterol 0.5 mg/2.5 mg/3 mL (DUONEB) neb solution 3 mL, 3 mL, Nebulization, Q4H PRN, Samantha Niño MD, 3 mL at 01/31/25 0038    ipratropium - albuterol 0.5 mg/2.5 mg/3 mL (DUONEB) neb solution 3 mL, 3 mL, Nebulization, 4x daily, Veronica Jauregui MD, 3 mL at 02/07/25 1152    levothyroxine (SYNTHROID/LEVOTHROID) tablet 112 mcg, 112 mcg, Oral, Daily,  Veronica Jauregui MD, 112 mcg at 02/07/25 0813    lidocaine (LMX4) cream, , Topical, Q1H PRN, Veronica Jauregui MD    lidocaine 1 % 0.1-1 mL, 0.1-1 mL, Other, Q1H PRN, Veronica Jauregui MD    loperamide (IMODIUM) capsule 2 mg, 2 mg, Oral, 4x Daily PRN, Gonzalez Watt MD    LORazepam (ATIVAN) half-tab 0.25 mg, 0.25 mg, Oral, Q6H PRN, Antoinette Solorio PA-C, 0.25 mg at 02/03/25 1417    melatonin tablet 5 mg, 5 mg, Oral, At Bedtime PRN, David Saba MD    methocarbamol (ROBAXIN) tablet 500 mg, 500 mg, Oral, 4x Daily PRN, Antoinette Solorio PA-C    metoprolol succinate ER (TOPROL XL) 24 hr tablet 50 mg, 50 mg, Oral, Daily, David Saba MD, 50 mg at 02/07/25 0814    [DISCONTINUED] morphine sulfate (ROXANOL) 20 mg/mL (HIGH CONC) soln 5 mg, 5 mg, Oral, Q2H PRN **OR** morphine sulfate (ROXANOL) 20 mg/mL (HIGH CONC) soln 5-10 mg, 5-10 mg, Oral, Q2H PRN, Antoinette Solorio PA-C, 10 mg at 01/28/25 0022    morphine sulfate (ROXANOL) 20 mg/mL (HIGH CONC) soln 7.5 mg, 7.5 mg, Oral, 5x Daily, Tino Wilhelm MD, 7.5 mg at 02/07/25 1127    multivitamin w/minerals (THERA-VIT-M) tablet 1 tablet, 1 tablet, Oral, Daily, Opal Bowman MD, 1 tablet at 02/07/25 0813    naloxone (NARCAN) injection 0.2 mg, 0.2 mg, Intravenous, Q2 Min PRN **OR** naloxone (NARCAN) injection 0.4 mg, 0.4 mg, Intravenous, Q2 Min PRN **OR** naloxone (NARCAN) injection 0.2 mg, 0.2 mg, Intramuscular, Q2 Min PRN **OR** naloxone (NARCAN) injection 0.4 mg, 0.4 mg, Intramuscular, Q2 Min PRN, Veronica Jauregui MD    ondansetron (ZOFRAN ODT) ODT tab 4 mg, 4 mg, Oral, Q6H PRN, David Saba MD    polyethylene glycol (MIRALAX) Packet 17 g, 17 g, Oral, BID PRN, Veronica Jauregui MD    potassium chloride santo ER (KLOR-CON M20) CR tablet 20 mEq, 20 mEq, Oral, BID, Opal Bowman MD, 20 mEq at 02/07/25 0812    predniSONE (DELTASONE) tablet 40 mg, 40 mg, Oral, Daily, 40 mg at 02/07/25 0814 **FOLLOWED BY** [START ON 2/11/2025] predniSONE  (DELTASONE) tablet 20 mg, 20 mg, Oral, Daily **FOLLOWED BY** [START ON 2/14/2025] predniSONE (DELTASONE) tablet 10 mg, 10 mg, Oral, Daily **FOLLOWED BY** [START ON 2/17/2025] predniSONE (DELTASONE) tablet 5 mg, 5 mg, Oral, Daily, Marce Anderson MD    rosuvastatin (CRESTOR) tablet 10 mg, 10 mg, Oral, Daily, Samantha Niño MD, 10 mg at 02/07/25 0813    senna-docusate (SENOKOT-S/PERICOLACE) 8.6-50 MG per tablet 1 tablet, 1 tablet, Oral, BID PRN **OR** senna-docusate (SENOKOT-S/PERICOLACE) 8.6-50 MG per tablet 2 tablet, 2 tablet, Oral, BID PRN, Veronica Jauregui MD, 2 tablet at 01/11/25 1651    sodium chloride (PF) 0.9% PF flush 3 mL, 3 mL, Intracatheter, Q8H, Veronica Jauregui MD, 3 mL at 02/07/25 0818    sodium chloride (PF) 0.9% PF flush 3 mL, 3 mL, Intracatheter, q1 min prn, Veronica Jauregui MD, 3 mL at 02/04/25 0140    Vitamin D3 (CHOLECALCIFEROL) tablet 25 mcg, 25 mcg, Oral, Daily, Opal Bowman MD, 25 mcg at 02/07/25 0813    Physical Exam   Vital Signs: Temp: 97.6  F (36.4  C) Temp src: Oral BP: 120/64 Pulse: 67   Resp: 20 SpO2: 95 % O2 Device: Nasal cannula Oxygen Delivery: 2 LPM  Weight: 155 lbs 6.4 oz  General appearence: awake alert  , some shortness of breath  but in  no apparent distress    RESPIRATORY:on 1 l of oxygen, diminished breath sounds on the bilateral lower lungs      CARDIOVASCULAR:S1 S2 regular rate and rhythm,  GASTROINTESTINAL:soft, non-distended , non-tender , + bowel sounds,    SKIN: warm and dry, no mottling noted   NEUROLOGIC; awake alert and oriented,   EXTREMITIES: no clubbing, cyanosis or edema       Data   CBC RESULTS:   Recent Labs   Lab Test 01/31/25  0808   WBC 5.9   RBC 3.29*   HGB 10.3*   HCT 30.8*   MCV 94   MCH 31.3   MCHC 33.4   RDW 16.5*   PLT 85*     Last Comprehensive Metabolic Panel:  Lab Results   Component Value Date     02/07/2025    POTASSIUM 4.3 02/07/2025    CHLORIDE 101 02/07/2025    CO2 28 02/07/2025    ANIONGAP 10 02/07/2025     (H)  02/07/2025    BUN 19.2 02/07/2025    CR 0.81 02/07/2025    GFRESTIMATED >90 02/07/2025    KARLIE 9.3 02/07/2025     CT Chest w/o Contrast   Final Result   IMPRESSION:   1. Increased left greater than right lower lobe peribronchovascular   consolidative and groundglass opacities with tree-in-bud nodularity,   which may be secondary to an infectious/inflammatory etiology,   including aspiration pneumonitis versus atypical pneumonia. Recommend   follow-up chest CT in 3 months.   2. Bilateral sub-6 mm solid pulmonary nodules. Recommend attention on   follow-up.   3. Cholelithiasis without evidence of acute cholecystitis.      I have personally reviewed the examination and initial interpretation   and I agree with the findings.      JUAN RUELAS MD            SYSTEM ID:  [id number]      XR Chest 2 Views   Final Result   IMPRESSION: Improved left base atelectasis/consolidation. No new   opacities.      I have personally reviewed the examination and initial interpretation   and I agree with the findings.      SHAN ROSALES MD            SYSTEM ID:  [id number]      XR Chest 2 Views   Final Result   IMPRESSION: No significant changes mammographically from 2 weeks ago.   Atherosclerosis as well.      DAVID RUIZ MD            SYSTEM ID:  [id number]      XR Chest 2 Views   Final Result   IMPRESSION: Unchanged atelectasis, mild edema or other airway   inflammation an osteoporotic mid thoracic compression deformities   unchanged. No acute consolidation or new dominant pleural effusion.      DAVID RUIZ MD            SYSTEM ID:  [id number]      XR Video Swallow with SLP or OT   Final Result   Impression:   1. Two episodes of valorie silent aspiration with thin liquid barium,   otherwise normal thin liquid trials with occasional transient flash   penetration.    2. Please see the speech pathologist report for further details.         TESFAYE MOONEY DO            SYSTEM ID:  [id number]      XR Chest 2 Views   Final  Result   Impression:    1. Decrease of right perihilar opacity. Increased left lower lobe   pulmonary opacities.   2. Unchanged linear density of the right upper lobe, likely relating   to fibrosis versus atelectasis.      I have personally reviewed the examination and initial interpretation   and I agree with the findings.      SILVANO GIANG MD            SYSTEM ID:  [id number]      CT Chest Pulmonary Embolism w Contrast   Final Result   IMPRESSION:    1. Exam is negative for acute pulmonary embolism.    2. Waxing and waning pulmonary nodularity with a new 8 mm nodular   focus in the left lower lobe. Findings are likely secondary to a   chronic infectious/inflammatory process including aspiration. Consider   short-term follow-up CT in one month for reassessment.   3. Additional sub-6 mm pulmonary nodules are stable compared to prior.   4. Moderate to severe emphysematous changes of the lung parenchyma   with chronic fibrotic changes along the right minor fissure.         In the event of a positive result for acute pulmonary embolism   resulting in right heart strain, consider calling the    [facility] hospital  for PERT (Pulmonary Embolism Response Team)   Activation?      PERT -- Pulmonary Embolism Response Team (Multidisciplinary team   including cardiology, interventional radiology, critical care,   hematology)      I have personally reviewed the examination and initial interpretation   and I agree with the findings.      TERESITA BUSTAMANTE MD            SYSTEM ID:  [id number]      Chest XR,  PA & LAT   Final Result   IMPRESSION:   1. Unchanged right upper lobe lung fibrosis with resultant volume loss   and hemidiaphragm elevation.   2. No focal airspace opacities.      I have personally reviewed the examination and initial interpretation   and I agree with the findings.      JUAN RUELAS MD            SYSTEM ID:  [id number]

## 2025-02-07 NOTE — PLAN OF CARE
Goal Outcome Evaluation:      Plan of Care Reviewed With: patient    Overall Patient Progress: no change    Outcome Evaluation: Pt is A&Ox4 with VSS on 2 LPM NC. Pt denies SOB at rest but experiences ACEVES and reports relief from nebulizer treatments. Pt has chronic back pain relieved by scheduled morphine. Pt up with personal 4 wheel walker with SB assist. Continent of B&B with LBM 2/6. Pt has scattered bruises and abrasions to BUE with skin tear on R forearm covered by mepilex. R PIV SL between abx. Pt is awaiting placement, IM and SW following.

## 2025-02-07 NOTE — PLAN OF CARE
Goal Outcome Evaluation:      Plan of Care Reviewed With: patient    Overall Patient Progress: no changeOverall Patient Progress: no change         VS: Temp: 97.4  F (36.3  C) Temp src: Oral BP: 114/64 Pulse: 75   Resp: 18 SpO2: 97 % O2 Device: Nasal cannula Oxygen Delivery: 2 LPM     O2: On 2L NC. Pt did not report SOB this shift but refused continous pulse ox. Denied chest pain.   Output: Continent x2.. Voids spontaneously w/o difficulty in bathroom.   Last BM: 2/6   Activity: SBA with walker. Pt refuses GB.    Skin: Scattered bruising BUE  Skin tear R forearm with mepelex   Pain: Managed with scheduled morphine. Pt has chronic back pain.    CMS: A&Ox4. Numbness & tingling in BLE.    Dressing: Mepelex on forearm    Diet: Regular   LDA: R PIV SL   Equipment: IV pole, walker, personal belongings   Plan: IV abx for pneumonia   Additional Info:

## 2025-02-07 NOTE — PLAN OF CARE
Goal Outcome Evaluation:      Plan of Care Reviewed With: patient    Overall Patient Progress: improvingOverall Patient Progress: improving    Outcome Evaluation: no acute changes this shift    VS: /67 (BP Location: Left arm, Patient Position: Semi-Rodriguez's)   Pulse 75   Temp 97.7  F (36.5  C) (Oral)   Resp 18   Wt 70.8 kg (156 lb)   SpO2 98%   BMI 23.37 kg/m     O2: >90% on RA  Denies chest pain, patient reports SOB. At the end of the shift, patient stated they felt it has improved    Output: Voiding without difficulty   Last BM: 2/4    Activity: SBA with walker, pt refuses gait belt    Skin: Scattered bruises/scratches on BUE  Skin tear on R forearm    Pain: Managed with scheduled morphine, patient stated it has not been working well despite the dose increasing    CMS: AxO x4  Numbness/tingling in BLE   Dressing: Mepilex on R forearm skin tear   Diet: regular   LDA: R PIV SL   Equipment: IV pole, walker, call light, personal belongings   Plan: Continue POC   Additional Info: Pt refusing continuous O2 monitoring

## 2025-02-07 NOTE — PROGRESS NOTES
"[facility]    Medicine Progress Note - Hospitalist Service, GOLD TEAM 18    Date of Admission:  1/4/2025    Assessment & Plan     Patient is a 80 y/o man who has a past medical history significant for thyroid cancer with lung metastases, Parkinson's disease, neuropathy, glaucoma, osteoporosis, chronic pain, severe COPD, hyperlipidemia, obstructive sleep apnea and coronary artery disease. Patient presented on 1/04/2025 with a 7 day history of productive cough and a 1 day history of weakness. CT pulmonary angiogram was negative for pulmonary embolism. Patient as admitted with  COPD exacerbation  and possible  community-acquired pneumonia as well.      Patient reportedly saw Pulmonology in Oct-2024 and COPD was moderately well-controlled at that time. Activity was apparently limited much more by his balance and parkinsonism than by respiratory status. Patient has antibiotics and prednisone available for outpatient COPD flares but apparently has not recently used this.      Patient apparently just entered hospice in the past week prior to hospital admission. Patient stated on admission that he was too weak and he and his daughter called their hospice agency and 911 was called and he was brought into the hospital. When provider asked on day of presentation whether patient wished for hospitalization and treatment of his potential infection, patient stated that he was seeking treatment and reported that his goal was to \"be able to do things for myself\". It was discussed that hospice typically focused on comfort rather than diagnostic testing and aggressive medical treatment. Patient acknowledged this but had seemed unsure of what his true desires for his care are moving forward.      Patient was on ceftriaxone from 1/04 - 1/08.  azithromycin 500 mg from 1/05-1/07-,  zosyn 1/09-1/ 14- now on every other day       Patient's overall prognosis is poor. Patient has been seen by " "palliative care      2/6  Patient was continued to have shortness of breath.  Palliative care had seen the patient and patient did not want to be DNR at this moment and wanted to pursue aggressive restorative therapy.  I did start him on tapering dose of steroid as recommended starting with 40 mg for 3 days and 20 mg for 5 days and 10 mg for 3 days then on prednisone 5 mg daily afterwards  Patient is noted to have low platelet count  No any bleeding diathesis.  Pneumonia  Sepsis   - completed antibiotics  - on admission felt to have sepsis   - Patient initially was treated for a possible community-acquired pneumonia on admission with ceftriaxone and azithromycin.  - Patient subsequently was treated for possible hospital-acquired pneumonia with zosyn.     COPD exacerbation, O2 and steroid dependent   Emphysema   Chronic hypoxemic respiratory failure  - shortness of breath  with minimal exertion  - sees pulmonary as out patient , has not been interested in pulmonary rehab   - recent prolonged hospitalization with COPD exacerbation    -PFTs of 3/24/2023 showing FEV1/FVC of 1.3/2.4 (46/65% predicted, respectively) for ratio 54%. TLC is reduced at 5.1 (74% predicted) and DLCO uncorrected is very low at 10.1 (41% predicted).   - was on prednisone 40 mg daily from 1/04/2025 to 1/08/2025. Patient received prednisone 20 mg once on 1/09- then  methylprednisolone 40 mg IV every 8 hours from 1/09- 1/13 and is no longer on steroids  - 1/28  started prednisone 40 mg x 5 days then 2 mg daily as he had been on this  those for a while prior and suspect he is steroid dependent , state she was taking the prednisone 2 mg pre admission   - uses Trelegy and getting it here   - on O2 1-2 litres at baseline.  - CT pulmonary angio 1/4 had no pulmonary embolism  . \"Waxing and waning pulmonary nodularity with a new 8 mm nodular focus in the left lower lobe. Findings are likely secondary to a chronic infectious/inflammatory process including " "aspiration. Consider short-term follow-up CT in one month for reassessment. 3. Additional sub-6 mm pulmonary nodules are stable compared to prior. 4. Moderate to severe emphysematous changes of the lung parenchyma with chronic fibrotic changes along the right minor fissure.\"  - Patient patient's daughter, patient may be placed on BiPAP if needed and per daughter he had been on BIPAP intermittently that helped with his breathing   - suspect has element of pulmonary hypertension , on lasix, did increase to bid 1/30       Coronary artery disease with past MI s/p stent Cx  1997  placement  HDL   - at time of initiall stent placement he was noted to have 80-90% lesion in nondominant RCA  and 50 % LAD that was treated medically   - Patient usually on aspirin and crestor , restarted aspirin   - Patient continuing metoprolol succinate 50 mg daily.       Hypertension  - continue PTA metoprolol succinate.  - continue lasix , increased to bid as above 1/30 , also increased Kcl     History of traumatic  subdural hemorrhage in 2023  - since resolved per NS follow up      Parkinson's disease  - recently diagnosed   -Has seen neurology and on sinemet and continue     Frequent falls  - with left femur fracture and inf pubic rami fracture, also history rib fracture        Metastatic thyroid cancer (multifocal papillary carcinoma)   Status post  thyroidectomy 2021   also received radioactive iodine   - had lymph node metastases,.  - Further surveillance as outpatient.  - continue levothyroxine         Right upper lobe lung mass,  s/p radiation therapy with possible radiation pneumonitis:  -  had previous VATS biopsy in Feb-2022 that was non-diagnostic.     New lung nodule in left lower lobe  - follow up  pulmonary         Thrombocytopenia  -  last plt count was 72K 1/14, then went to  54K ---59K ---85K, was low on admission  - he denies alcohol consumption  - cont to monitor   - restarted ASA    - peripheral smear nonrevealing        " Rheumatoid arthritis with positive rheumatoid factor:  - Per chart review, patient had once been on methotrexate and prednisone but had stopped these medications a few years ago.  - Patient to f/u as outpatient if desired.     Osteopenia   -had been on calcium and vit D       History of immunodeficency  - IG subclass 2      Hypernatremia  - improved:      Neuropathy   chronic pain syndrome:  - Patient currently on gabapentin 400 mg three times a day.  --his  PTA robaxin 500mg TID has been on hold   -Hold PTA trazadone 50mg tab QHS  -Continue PTA oxycodone 5mg Q6H for pain      Glaucoma:  Dry eyes    - Patient not currently on medication for this.  - Patient to f/u with Ophthalmology as outpatient if further treatment is desired.       osteoporosis  h/o right femoral neck fracture s/p right total hip replacement   -hold PTA denosumab 60mg injection every 6 months (last 10/25/2024)  -restarted  PTA calcium , vitamin D  - prolia q 6 mo          Moderate malnutrition in the context of acute vs. chronic illness:  - Supplementing as able.   Cognitive impairment.  Palliative care is questioning patient's decision-making capacity and recommending OT consult for SLUMS/MOCA     Diet: Regular Diet Adult Thin Liquids (level 0)  Snacks/Supplements Adult: Ensure Enlive; With Meals    DVT Prophylaxis: consider subcutaneous heparin   Mir Catheter: Not present  Lines: None     Cardiac Monitoring: None  Code Status: No CPR- Do NOT Intubate      Clinically Significant Risk Factors                 # Thrombocytopenia: Lowest platelets = 14 in last 2 days, will monitor for bleeding   # Hypertension: Noted on problem list     # Acute Hypoxic Respiratory Failure: Documented O2 saturation < 90%. Continue supplemental oxygen as needed          # Moderate Malnutrition: based on nutrition assessment    # Financial/Environmental Concerns:           Social Drivers of Health    Tobacco Use: Medium Risk (10/18/2024)    Patient History      Smoking Tobacco Use: Former     Smokeless Tobacco Use: Never   Physical Activity: Inactive (10/14/2024)    Exercise Vital Sign     Days of Exercise per Week: 0 days     Minutes of Exercise per Session: 0 min   Stress: Stress Concern Present (10/14/2024)    [employer] - Occupational Stress Questionnaire     Feeling of Stress : Very much   Social Connections: Unknown (10/14/2024)    Social Connection and Isolation Panel [NHANES]     Frequency of Social Gatherings with Friends and Family: More than three times a week          Disposition Plan     Medically Ready for Discharge: Anticipated in 5+ Days once bed is available              Marce Anderson MD  Hospitalist Service, GOLD TEAM 18  [facility]  Securely message with [employer] (more info)  Text page via [facility] Paging/Directory   See signed in provider for up to date coverage information  ______________________________________________________________________    Interval History   He  cannot really tell if breathing is better, he seems to be speaking a bit better but still seems to have significant shortness of breath  , when not talking breathing is calm     Current Facility-Administered Medications:     acetaminophen (TYLENOL) tablet 650 mg, 650 mg, Oral, Q4H PRN, David Saba MD    amLODIPine (NORVASC) tablet 5 mg, 5 mg, Oral, At Bedtime, Samantha Niño MD, 5 mg at 01/31/25 2237    [Held by provider] aspirin EC tablet 81 mg, 81 mg, Oral, BID, Opal Bowman MD, 81 mg at 02/07/25 0813    azithromycin (ZITHROMAX) 500 mg in  mL intermittent infusion, 500 mg, Intravenous, Q24H, Marce Anderson MD, 500 mg at 02/06/25 1649    [Held by provider] azithromycin (ZITHROMAX) tablet 500 mg, 500 mg, Oral, Every Other Day, Samantha Niño MD, 500 mg at 02/05/25 0843    calcium carbonate (OS-KARLIE) tablet 600 mg, 600 mg, Oral, Daily, Opal Bowman MD, 600 mg at 02/07/25 0816     carbidopa-levodopa (SINEMET)  MG per tablet 1 tablet, 1 tablet, Oral, TID, 1 tablet at 02/07/25 0814 **AND** carbidopa-levodopa half-tab 12.5-50 mg, 1 half-tab, Oral, TID, Veronica Jauregui MD, 1 half-tab at 02/07/25 0814    carboxymethylcellulose PF (REFRESH PLUS) 0.5 % ophthalmic solution 1 drop, 1 drop, Both Eyes, Q1H PRN, Veronica Jauregui MD, 1 drop at 01/25/25 1945    Fluticasone-Umeclidin-Vilanterol (TRELEGY ELLIPTA) 200-62.5-25 MCG/ACT oral inhaler 1 puff, 1 puff, Inhalation, Daily, Gonzalez Watt MD, 1 puff at 02/07/25 0802    furosemide (LASIX) tablet 20 mg, 20 mg, Oral, BID, Opal Bowman MD, 20 mg at 02/07/25 0814    gabapentin (NEURONTIN) capsule 400 mg, 400 mg, Oral, TID, David Saba MD, 400 mg at 02/07/25 0813    guaiFENesin (MUCINEX) 12 hr tablet 600 mg, 600 mg, Oral, BID, Tino Wilhelm MD, 600 mg at 02/07/25 0813    ipratropium - albuterol 0.5 mg/2.5 mg/3 mL (DUONEB) neb solution 3 mL, 3 mL, Nebulization, Q4H PRN, Samantha Niño MD, 3 mL at 01/31/25 0038    ipratropium - albuterol 0.5 mg/2.5 mg/3 mL (DUONEB) neb solution 3 mL, 3 mL, Nebulization, 4x daily, Veronica Jauregui MD, 3 mL at 02/07/25 1152    levothyroxine (SYNTHROID/LEVOTHROID) tablet 112 mcg, 112 mcg, Oral, Daily, Veronica Jauregui MD, 112 mcg at 02/07/25 0813    lidocaine (LMX4) cream, , Topical, Q1H PRN, Veronica Jauregui MD    lidocaine 1 % 0.1-1 mL, 0.1-1 mL, Other, Q1H PRN, Veronica Jauregui MD    loperamide (IMODIUM) capsule 2 mg, 2 mg, Oral, 4x Daily PRN, Gonzalez Watt MD    LORazepam (ATIVAN) half-tab 0.25 mg, 0.25 mg, Oral, Q6H PRN, Antoinette Solorio PA-C, 0.25 mg at 02/03/25 1417    melatonin tablet 5 mg, 5 mg, Oral, At Bedtime PRN, David aSba MD    methocarbamol (ROBAXIN) tablet 500 mg, 500 mg, Oral, 4x Daily PRN, Antoinette Solorio PA-C    metoprolol succinate ER (TOPROL XL) 24 hr tablet 50 mg, 50 mg, Oral, Daily, David Saba MD, 50 mg at 02/07/25 0814    [DISCONTINUED] morphine  sulfate (ROXANOL) 20 mg/mL (HIGH CONC) soln 5 mg, 5 mg, Oral, Q2H PRN **OR** morphine sulfate (ROXANOL) 20 mg/mL (HIGH CONC) soln 5-10 mg, 5-10 mg, Oral, Q2H PRN, Antoinette Solorio PA-C, 10 mg at 01/28/25 0022    morphine sulfate (ROXANOL) 20 mg/mL (HIGH CONC) soln 7.5 mg, 7.5 mg, Oral, 5x Daily, Tino Wilhelm MD, 7.5 mg at 02/07/25 1127    multivitamin w/minerals (THERA-VIT-M) tablet 1 tablet, 1 tablet, Oral, Daily, Opal Bowman MD, 1 tablet at 02/07/25 0813    naloxone (NARCAN) injection 0.2 mg, 0.2 mg, Intravenous, Q2 Min PRN **OR** naloxone (NARCAN) injection 0.4 mg, 0.4 mg, Intravenous, Q2 Min PRN **OR** naloxone (NARCAN) injection 0.2 mg, 0.2 mg, Intramuscular, Q2 Min PRN **OR** naloxone (NARCAN) injection 0.4 mg, 0.4 mg, Intramuscular, Q2 Min PRN, Veronica Jauregui MD    ondansetron (ZOFRAN ODT) ODT tab 4 mg, 4 mg, Oral, Q6H PRN, David Saba MD    polyethylene glycol (MIRALAX) Packet 17 g, 17 g, Oral, BID PRN, Veronica Jauregui MD    potassium chloride santo ER (KLOR-CON M20) CR tablet 20 mEq, 20 mEq, Oral, BID, Opal Bowman MD, 20 mEq at 02/07/25 0812    predniSONE (DELTASONE) tablet 40 mg, 40 mg, Oral, Daily, 40 mg at 02/07/25 0814 **FOLLOWED BY** [START ON 2/11/2025] predniSONE (DELTASONE) tablet 20 mg, 20 mg, Oral, Daily **FOLLOWED BY** [START ON 2/14/2025] predniSONE (DELTASONE) tablet 10 mg, 10 mg, Oral, Daily **FOLLOWED BY** [START ON 2/17/2025] predniSONE (DELTASONE) tablet 5 mg, 5 mg, Oral, Daily, Marce Anderson MD    rosuvastatin (CRESTOR) tablet 10 mg, 10 mg, Oral, Daily, Samantha Niño MD, 10 mg at 02/07/25 0813    senna-docusate (SENOKOT-S/PERICOLACE) 8.6-50 MG per tablet 1 tablet, 1 tablet, Oral, BID PRN **OR** senna-docusate (SENOKOT-S/PERICOLACE) 8.6-50 MG per tablet 2 tablet, 2 tablet, Oral, BID PRN, Veronica Jauregui MD, 2 tablet at 01/11/25 1651    sodium chloride (PF) 0.9% PF flush 3 mL, 3 mL, Intracatheter, Q8H, Veronica Jauregui MD, 3 mL at 02/07/25 0818     sodium chloride (PF) 0.9% PF flush 3 mL, 3 mL, Intracatheter, q1 min prn, Veronica Jauregui MD, 3 mL at 02/04/25 0140    Vitamin D3 (CHOLECALCIFEROL) tablet 25 mcg, 25 mcg, Oral, Daily, Opal Bowman MD, 25 mcg at 02/07/25 0813    Physical Exam   Vital Signs: Temp: 97.6  F (36.4  C) Temp src: Oral BP: 120/64 Pulse: 67   Resp: 20 SpO2: 95 % O2 Device: Nasal cannula Oxygen Delivery: 2 LPM  Weight: 155 lbs 6.4 oz  General appearence: awake alert  , some shortness of breath  but in  no apparent distress    RESPIRATORY:on 1 l of oxygen, diminished breath sounds on the bilateral lower lungs      CARDIOVASCULAR:S1 S2 regular rate and rhythm,  GASTROINTESTINAL:soft, non-distended , non-tender , + bowel sounds,    SKIN: warm and dry, no mottling noted   NEUROLOGIC; awake alert and oriented,   EXTREMITIES: no clubbing, cyanosis or edema       Data   CBC RESULTS:   Recent Labs   Lab Test 01/31/25  0808   WBC 5.9   RBC 3.29*   HGB 10.3*   HCT 30.8*   MCV 94   MCH 31.3   MCHC 33.4   RDW 16.5*   PLT 85*     Last Comprehensive Metabolic Panel:  Lab Results   Component Value Date     02/07/2025    POTASSIUM 4.3 02/07/2025    CHLORIDE 101 02/07/2025    CO2 28 02/07/2025    ANIONGAP 10 02/07/2025     (H) 02/07/2025    BUN 19.2 02/07/2025    CR 0.81 02/07/2025    GFRESTIMATED >90 02/07/2025    KARLIE 9.3 02/07/2025     CT Chest w/o Contrast   Final Result   IMPRESSION:   1. Increased left greater than right lower lobe peribronchovascular   consolidative and groundglass opacities with tree-in-bud nodularity,   which may be secondary to an infectious/inflammatory etiology,   including aspiration pneumonitis versus atypical pneumonia. Recommend   follow-up chest CT in 3 months.   2. Bilateral sub-6 mm solid pulmonary nodules. Recommend attention on   follow-up.   3. Cholelithiasis without evidence of acute cholecystitis.      I have personally reviewed the examination and initial interpretation   and I agree with the  findings.      JUAN RUELAS MD            SYSTEM ID:  [id number]      XR Chest 2 Views   Final Result   IMPRESSION: Improved left base atelectasis/consolidation. No new   opacities.      I have personally reviewed the examination and initial interpretation   and I agree with the findings.      SHAN ROSALES MD            SYSTEM ID:  [id number]      XR Chest 2 Views   Final Result   IMPRESSION: No significant changes mammographically from 2 weeks ago.   Atherosclerosis as well.      DAVID RUIZ MD            SYSTEM ID:  [id number]      XR Chest 2 Views   Final Result   IMPRESSION: Unchanged atelectasis, mild edema or other airway   inflammation an osteoporotic mid thoracic compression deformities   unchanged. No acute consolidation or new dominant pleural effusion.      DAVID RUIZ MD            SYSTEM ID:  [id number]      XR Video Swallow with SLP or OT   Final Result   Impression:   1. Two episodes of valorie silent aspiration with thin liquid barium,   otherwise normal thin liquid trials with occasional transient flash   penetration.    2. Please see the speech pathologist report for further details.         TESFAYE MOONEY DO            SYSTEM ID:  [id number]      XR Chest 2 Views   Final Result   Impression:    1. Decrease of right perihilar opacity. Increased left lower lobe   pulmonary opacities.   2. Unchanged linear density of the right upper lobe, likely relating   to fibrosis versus atelectasis.      I have personally reviewed the examination and initial interpretation   and I agree with the findings.      SILVANO GIANG MD            SYSTEM ID:  [id number]      CT Chest Pulmonary Embolism w Contrast   Final Result   IMPRESSION:    1. Exam is negative for acute pulmonary embolism.    2. Waxing and waning pulmonary nodularity with a new 8 mm nodular   focus in the left lower lobe. Findings are likely secondary to a   chronic infectious/inflammatory process including aspiration. Consider    short-term follow-up CT in one month for reassessment.   3. Additional sub-6 mm pulmonary nodules are stable compared to prior.   4. Moderate to severe emphysematous changes of the lung parenchyma   with chronic fibrotic changes along the right minor fissure.         In the event of a positive result for acute pulmonary embolism   resulting in right heart strain, consider calling the    [facility] hospital  for PERT (Pulmonary Embolism Response Team)   Activation?      PERT -- Pulmonary Embolism Response Team (Multidisciplinary team   including cardiology, interventional radiology, critical care,   hematology)      I have personally reviewed the examination and initial interpretation   and I agree with the findings.      TERESITA BUSTAMANTE MD            SYSTEM ID:  [id number]      Chest XR,  PA & LAT   Final Result   IMPRESSION:   1. Unchanged right upper lobe lung fibrosis with resultant volume loss   and hemidiaphragm elevation.   2. No focal airspace opacities.      I have personally reviewed the examination and initial interpretation   and I agree with the findings.      JUAN RUELAS MD            SYSTEM ID:  [id number]

## 2025-02-08 PROCEDURE — 250N000009 HC RX 250

## 2025-02-08 PROCEDURE — 250N000013 HC RX MED GY IP 250 OP 250 PS 637

## 2025-02-08 PROCEDURE — 99232 SBSQ HOSP IP/OBS MODERATE 35: CPT | Performed by: INTERNAL MEDICINE

## 2025-02-08 PROCEDURE — 94640 AIRWAY INHALATION TREATMENT: CPT | Mod: 76

## 2025-02-08 PROCEDURE — 250N000013 HC RX MED GY IP 250 OP 250 PS 637: Performed by: INTERNAL MEDICINE

## 2025-02-08 PROCEDURE — 250N000013 HC RX MED GY IP 250 OP 250 PS 637: Performed by: STUDENT IN AN ORGANIZED HEALTH CARE EDUCATION/TRAINING PROGRAM

## 2025-02-08 PROCEDURE — 250N000012 HC RX MED GY IP 250 OP 636 PS 637: Performed by: INTERNAL MEDICINE

## 2025-02-08 PROCEDURE — 94640 AIRWAY INHALATION TREATMENT: CPT

## 2025-02-08 PROCEDURE — 999N000157 HC STATISTIC RCP TIME EA 10 MIN

## 2025-02-08 PROCEDURE — 120N000002 HC R&B MED SURG/OB UMMC

## 2025-02-08 RX ADMIN — POTASSIUM CHLORIDE 20 MEQ: 1500 TABLET, EXTENDED RELEASE ORAL at 08:10

## 2025-02-08 RX ADMIN — MORPHINE SULFATE 7.5 MG: 20 SOLUTION ORAL at 11:23

## 2025-02-08 RX ADMIN — SENNOSIDES AND DOCUSATE SODIUM 1 TABLET: 50; 8.6 TABLET ORAL at 22:08

## 2025-02-08 RX ADMIN — Medication 1 HALF-TAB: at 08:10

## 2025-02-08 RX ADMIN — FUROSEMIDE 20 MG: 20 TABLET ORAL at 16:44

## 2025-02-08 RX ADMIN — MORPHINE SULFATE 7.5 MG: 20 SOLUTION ORAL at 18:06

## 2025-02-08 RX ADMIN — Medication 1 HALF-TAB: at 13:38

## 2025-02-08 RX ADMIN — MORPHINE SULFATE 7.5 MG: 20 SOLUTION ORAL at 22:07

## 2025-02-08 RX ADMIN — IPRATROPIUM BROMIDE AND ALBUTEROL SULFATE 3 ML: .5; 3 SOLUTION RESPIRATORY (INHALATION) at 16:30

## 2025-02-08 RX ADMIN — LEVOTHYROXINE SODIUM 112 MCG: 112 TABLET ORAL at 08:11

## 2025-02-08 RX ADMIN — MORPHINE SULFATE 7.5 MG: 20 SOLUTION ORAL at 08:06

## 2025-02-08 RX ADMIN — Medication 25 MCG: at 08:11

## 2025-02-08 RX ADMIN — GUAIFENESIN 600 MG: 600 TABLET ORAL at 08:11

## 2025-02-08 RX ADMIN — Medication 600 MG: at 08:10

## 2025-02-08 RX ADMIN — GABAPENTIN 400 MG: 300 CAPSULE ORAL at 20:09

## 2025-02-08 RX ADMIN — PREDNISONE 40 MG: 20 TABLET ORAL at 08:10

## 2025-02-08 RX ADMIN — Medication 1 TABLET: at 08:10

## 2025-02-08 RX ADMIN — GABAPENTIN 400 MG: 300 CAPSULE ORAL at 13:37

## 2025-02-08 RX ADMIN — GABAPENTIN 400 MG: 300 CAPSULE ORAL at 08:10

## 2025-02-08 RX ADMIN — MORPHINE SULFATE 7.5 MG: 20 SOLUTION ORAL at 13:37

## 2025-02-08 RX ADMIN — CARBIDOPA AND LEVODOPA 1 TABLET: 25; 100 TABLET ORAL at 08:12

## 2025-02-08 RX ADMIN — FUROSEMIDE 20 MG: 20 TABLET ORAL at 08:11

## 2025-02-08 RX ADMIN — POTASSIUM CHLORIDE 20 MEQ: 1500 TABLET, EXTENDED RELEASE ORAL at 20:10

## 2025-02-08 RX ADMIN — IPRATROPIUM BROMIDE AND ALBUTEROL SULFATE 3 ML: .5; 3 SOLUTION RESPIRATORY (INHALATION) at 20:27

## 2025-02-08 RX ADMIN — IPRATROPIUM BROMIDE AND ALBUTEROL SULFATE 3 ML: .5; 3 SOLUTION RESPIRATORY (INHALATION) at 12:28

## 2025-02-08 RX ADMIN — Medication 1 HALF-TAB: at 20:09

## 2025-02-08 RX ADMIN — ROSUVASTATIN 10 MG: 10 TABLET, FILM COATED ORAL at 08:11

## 2025-02-08 RX ADMIN — IPRATROPIUM BROMIDE AND ALBUTEROL SULFATE 3 ML: .5; 3 SOLUTION RESPIRATORY (INHALATION) at 09:47

## 2025-02-08 RX ADMIN — CARBIDOPA AND LEVODOPA 1 TABLET: 25; 100 TABLET ORAL at 13:38

## 2025-02-08 RX ADMIN — METOPROLOL SUCCINATE 50 MG: 50 TABLET, EXTENDED RELEASE ORAL at 08:11

## 2025-02-08 RX ADMIN — CARBIDOPA AND LEVODOPA 1 TABLET: 25; 100 TABLET ORAL at 20:09

## 2025-02-08 RX ADMIN — GUAIFENESIN 600 MG: 600 TABLET ORAL at 20:09

## 2025-02-08 ASSESSMENT — ACTIVITIES OF DAILY LIVING (ADL)
ADLS_ACUITY_SCORE: 42

## 2025-02-08 NOTE — PLAN OF CARE
Goal Outcome Evaluation:                        VS: VSS and afebrile    O2: Sats > 92% with 2L   Ambulatory pulxe ox in place- spot checked done.  Pt refused room oxymter.    Output: Voids without difficulty    Last BM: 2/6 and passing gas    Activity: Up with SBA, FWW    Skin: Sacttered bruises and abrasions to bilateral upper extremities with skin tear on R-fa   Pain: Chronic back pain managed with morphine    Neuro: Alert and orientedx4    Dressing: Mepilex to R FA-intact, due to be changed 2/11   Diet: Regular, pt loves ice chips    LDA: R-PIV-SL    Equipment: Walker, personal belongings at bedside    Plan: LTC, hospice- SW is ff   Additional Info:

## 2025-02-08 NOTE — PLAN OF CARE
VS: /57   Pulse 67   Temp 98.3  F (36.8  C) (Oral)   Resp 16   Wt 70.7 kg (155 lb 14.4 oz)   SpO2 95%   BMI 23.36 kg/m     Refused to be on continuous pulse ox, monitored manually frequently   O2: On 1 LPM via nasal cannula, with dyspnea upon exertion   Output: Continent both bowel and bladder, voiding well, using toilet as needed   Last BM: 2-6-2025   Activity: SBA with 4WW   Skin: Scattered bruising and scab on BUE and BLE   Pain: Managed with scheduled morphine   CMS: A&Ox4, with baseline numbness and tingling on BLE   Dressing: R forearm CDI  Sacral mepilex for prevention   Diet: Regular diet   LDA: PIV on R forearm saline locked   Equipment: Personal items, call light and IV pole   Plan: Pending LTC placement   Additional Info:

## 2025-02-08 NOTE — PROGRESS NOTES
"[facility]    Medicine Progress Note - Hospitalist Service, GOLD TEAM 18    Date of Admission:  1/4/2025    Assessment & Plan     Patient is a 78 y/o man who has a past medical history significant for thyroid cancer with lung metastases, Parkinson's disease, neuropathy, glaucoma, osteoporosis, chronic pain, severe COPD, hyperlipidemia, obstructive sleep apnea and coronary artery disease. Patient presented on 1/04/2025 with a 7 day history of productive cough and a 1 day history of weakness. CT pulmonary angiogram was negative for pulmonary embolism. Patient as admitted with  COPD exacerbation  and possible  community-acquired pneumonia as well.      Patient reportedly saw Pulmonology in Oct-2024 and COPD was moderately well-controlled at that time. Activity was apparently limited much more by his balance and parkinsonism than by respiratory status. Patient has antibiotics and prednisone available for outpatient COPD flares but apparently has not recently used this.      Patient apparently just entered hospice in the past week prior to hospital admission. Patient stated on admission that he was too weak and he and his daughter called their hospice agency and 911 was called and he was brought into the hospital. When provider asked on day of presentation whether patient wished for hospitalization and treatment of his potential infection, patient stated that he was seeking treatment and reported that his goal was to \"be able to do things for myself\". It was discussed that hospice typically focused on comfort rather than diagnostic testing and aggressive medical treatment. Patient acknowledged this but had seemed unsure of what his true desires for his care are moving forward.      Patient was on ceftriaxone from 1/04 - 1/08.  azithromycin 500 mg from 1/05-1/07-,  zosyn 1/09-1/ 14- now on every other day       Patient's overall prognosis is poor. Patient has been seen by " palliative care      2/8  Patient feels somewhat better after the Prednisone Burst started. He is currently on 1 l of oxygen but asked to be placed on 2 L as he could not speak with full sentence due to dyspnea.  Palliative care had seen the patient and patient did not want to be DNR at this moment and wanted to pursue aggressive restorative therapy.  I did start him on tapering dose of steroid as recommended starting with 40 mg for 3 days and 20 mg for 5 days and 10 mg for 3 days then on prednisone 5 mg daily afterwards  Patient is noted to have low platelet count  No any bleeding diathesis.  Pneumonia, CT showed bilateral opacities which is suspicious for atypical bacterial pneumonia.  Patient has been on azithromycin 500 mg daily as suggested by pulmonary medicine.  However patient felt that he might benefit from IV antibiotic therapy.  Discussed with palliative care about the goals of care discussion.  Patient wanted to pursue aggressive restorative therapy.  I elected to give him azithromycin 500 mg IV for a few more days and switch back to the oral  Sepsis   - completed antibiotics  - on admission felt to have sepsis   - Patient initially was treated for a possible community-acquired pneumonia on admission with ceftriaxone and azithromycin.  - Patient subsequently was treated for possible hospital-acquired pneumonia with zosyn.     COPD exacerbation, O2 and steroid dependent   Emphysema   Chronic hypoxemic respiratory failure  - shortness of breath  with minimal exertion  - sees pulmonary as out patient , has not been interested in pulmonary rehab   - recent prolonged hospitalization with COPD exacerbation    -PFTs of 3/24/2023 showing FEV1/FVC of 1.3/2.4 (46/65% predicted, respectively) for ratio 54%. TLC is reduced at 5.1 (74% predicted) and DLCO uncorrected is very low at 10.1 (41% predicted).   - was on prednisone 40 mg daily from 1/04/2025 to 1/08/2025. Patient received prednisone 20 mg once on 1/09- then  " methylprednisolone 40 mg IV every 8 hours from 1/09- 1/13 and is no longer on steroids  - 1/28  started prednisone 40 mg x 5 days then 2 mg daily as he had been on this  those for a while prior and suspect he is steroid dependent , state she was taking the prednisone 2 mg pre admission   - uses Trelegy and getting it here   - on O2 1-2 litres at baseline.  - CT pulmonary angio 1/4 had no pulmonary embolism  . \"Waxing and waning pulmonary nodularity with a new 8 mm nodular focus in the left lower lobe. Findings are likely secondary to a chronic infectious/inflammatory process including aspiration. Consider short-term follow-up CT in one month for reassessment. 3. Additional sub-6 mm pulmonary nodules are stable compared to prior. 4. Moderate to severe emphysematous changes of the lung parenchyma with chronic fibrotic changes along the right minor fissure.\"  - Patient patient's daughter, patient may be placed on BiPAP if needed and per daughter he had been on BIPAP intermittently that helped with his breathing   - suspect has element of pulmonary hypertension , on lasix, did increase to bid 1/30       Coronary artery disease with past MI s/p stent Cx  1997  placement  HDL   - at time of initiall stent placement he was noted to have 80-90% lesion in nondominant RCA  and 50 % LAD that was treated medically   - Patient usually on aspirin and crestor , restarted aspirin   - Patient continuing metoprolol succinate 50 mg daily.       Hypertension  - continue PTA metoprolol succinate.  - continue lasix , increased to bid as above 1/30 , also increased Kcl     History of traumatic  subdural hemorrhage in 2023  - since resolved per NS follow up      Parkinson's disease  - recently diagnosed   -Has seen neurology and on sinemet and continue     Frequent falls  - with left femur fracture and inf pubic rami fracture, also history rib fracture        Metastatic thyroid cancer (multifocal papillary carcinoma)   Status post  " thyroidectomy 2021   also received radioactive iodine   - had lymph node metastases,.  - Further surveillance as outpatient.  - continue levothyroxine         Right upper lobe lung mass,  s/p radiation therapy with possible radiation pneumonitis:  -  had previous VATS biopsy in Feb-2022 that was non-diagnostic.     New lung nodule in left lower lobe  - follow up  pulmonary         Thrombocytopenia  -  last plt count was 72K 1/14, then went to  54K ---59K ---85K, was low on admission  - he denies alcohol consumption  - cont to monitor   - restarted ASA    Platelet count dropped to 14,000 on 12/16.  Aspirin 81 mg held due to severe thrombocytopenia.  There is no any bleeding diathesis at this moment.       Rheumatoid arthritis with positive rheumatoid factor:  - Per chart review, patient had once been on methotrexate and prednisone but had stopped these medications a few years ago.  - Patient to f/u as outpatient if desired.     Osteopenia   -had been on calcium and vit D       History of immunodeficency  - IG subclass 2      Hypernatremia  - improved:      Neuropathy   chronic pain syndrome:  - Patient currently on gabapentin 400 mg three times a day.  --his  PTA robaxin 500mg TID has been on hold   -Hold PTA trazadone 50mg tab QHS  -Continue PTA oxycodone 5mg Q6H for pain      Glaucoma:  Dry eyes    - Patient not currently on medication for this.  - Patient to f/u with Ophthalmology as outpatient if further treatment is desired.       osteoporosis  h/o right femoral neck fracture s/p right total hip replacement   -hold PTA denosumab 60mg injection every 6 months (last 10/25/2024)  -restarted  PTA calcium , vitamin D  - prolia q 6 mo          Moderate malnutrition in the context of acute vs. chronic illness:  - Supplementing as able.   Cognitive impairment.  Palliative care is questioning patient's decision-making capacity and recommending OT consult for SLUMS/MOCA     Diet: Regular Diet Adult Thin Liquids (level  0)  Snacks/Supplements Adult: Ensure Enlive; With Meals    DVT Prophylaxis: consider subcutaneous heparin   Mir Catheter: Not present  Lines: None     Cardiac Monitoring: None  Code Status: No CPR- Do NOT Intubate      Clinically Significant Risk Factors                 # Thrombocytopenia: Lowest platelets = 14 in last 2 days, will monitor for bleeding   # Hypertension: Noted on problem list     # Acute Hypoxic Respiratory Failure: Documented O2 saturation < 90%. Continue supplemental oxygen as needed          # Moderate Malnutrition: based on nutrition assessment    # Financial/Environmental Concerns:           Social Drivers of Health    Tobacco Use: Medium Risk (10/18/2024)    Patient History     Smoking Tobacco Use: Former     Smokeless Tobacco Use: Never   Physical Activity: Inactive (10/14/2024)    Exercise Vital Sign     Days of Exercise per Week: 0 days     Minutes of Exercise per Session: 0 min   Stress: Stress Concern Present (10/14/2024)    [employer] - Occupational Stress Questionnaire     Feeling of Stress : Very much   Social Connections: Unknown (10/14/2024)    Social Connection and Isolation Panel [NHANES]     Frequency of Social Gatherings with Friends and Family: More than three times a week          Disposition Plan   Discharge planning based on progress.             Marce Anderson MD  Hospitalist Service, [facility] 18  [facility]  Securely message with [employer] (more info)  Text page via [facility] Paging/Directory   See signed in provider for up to date coverage information  ______________________________________________________________________    Interval History   He  cannot really tell if breathing is better, he seems to be speaking a bit better but still seems to have significant shortness of breath  , when not talking breathing is calm   Remains afebrile.  No any vomiting or diarrhea    Current Facility-Administered  Medications:     acetaminophen (TYLENOL) tablet 650 mg, 650 mg, Oral, Q4H PRN, David Saba MD    amLODIPine (NORVASC) tablet 5 mg, 5 mg, Oral, At Bedtime, Samantha Niño MD, 5 mg at 01/31/25 2237    [Held by provider] aspirin EC tablet 81 mg, 81 mg, Oral, BID, pOal Bowman MD, 81 mg at 02/07/25 0813    azithromycin (ZITHROMAX) 500 mg in  mL intermittent infusion, 500 mg, Intravenous, Q24H, Marce Anderson MD, 500 mg at 02/07/25 1530    [Held by provider] azithromycin (ZITHROMAX) tablet 500 mg, 500 mg, Oral, Every Other Day, Samantha Niño MD, 500 mg at 02/05/25 0843    calcium carbonate (OS-KARLIE) tablet 600 mg, 600 mg, Oral, Daily, Opal Bowman MD, 600 mg at 02/08/25 0810    carbidopa-levodopa (SINEMET)  MG per tablet 1 tablet, 1 tablet, Oral, TID, 1 tablet at 02/08/25 0812 **AND** carbidopa-levodopa half-tab 12.5-50 mg, 1 half-tab, Oral, TID, Veronica Jauregui MD, 1 half-tab at 02/08/25 0810    carboxymethylcellulose PF (REFRESH PLUS) 0.5 % ophthalmic solution 1 drop, 1 drop, Both Eyes, Q1H PRN, Veronica Jauregui MD, 1 drop at 01/25/25 1945    Fluticasone-Umeclidin-Vilanterol (TRELEGY ELLIPTA) 200-62.5-25 MCG/ACT oral inhaler 1 puff, 1 puff, Inhalation, Daily, Gonzalez Watt MD, 1 puff at 02/08/25 0812    furosemide (LASIX) tablet 20 mg, 20 mg, Oral, BID, Opal Bowman MD, 20 mg at 02/08/25 0811    gabapentin (NEURONTIN) capsule 400 mg, 400 mg, Oral, TID, David Saba MD, 400 mg at 02/08/25 0810    guaiFENesin (MUCINEX) 12 hr tablet 600 mg, 600 mg, Oral, BID, Tino Wilhelm MD, 600 mg at 02/08/25 0811    ipratropium - albuterol 0.5 mg/2.5 mg/3 mL (DUONEB) neb solution 3 mL, 3 mL, Nebulization, Q4H PRN, Samantha Niño MD, 3 mL at 02/07/25 1856    ipratropium - albuterol 0.5 mg/2.5 mg/3 mL (DUONEB) neb solution 3 mL, 3 mL, Nebulization, 4x daily, Veronica Jauregui MD, 3 mL at 02/08/25 0947    levothyroxine (SYNTHROID/LEVOTHROID) tablet 112 mcg, 112 mcg, Oral, Daily,  Veronica Jauregui MD, 112 mcg at 02/08/25 0811    lidocaine (LMX4) cream, , Topical, Q1H PRN, Veronica Jauregui MD    lidocaine 1 % 0.1-1 mL, 0.1-1 mL, Other, Q1H PRN, Veronica Jauregui MD    loperamide (IMODIUM) capsule 2 mg, 2 mg, Oral, 4x Daily PRN, Gonzalez Watt MD    LORazepam (ATIVAN) half-tab 0.25 mg, 0.25 mg, Oral, Q6H PRN, Antointete Solorio PA-C, 0.25 mg at 02/03/25 1417    melatonin tablet 5 mg, 5 mg, Oral, At Bedtime PRN, David Saba MD    methocarbamol (ROBAXIN) tablet 500 mg, 500 mg, Oral, 4x Daily PRN, Antoinette Solorio PA-C    metoprolol succinate ER (TOPROL XL) 24 hr tablet 50 mg, 50 mg, Oral, Daily, David Saba MD, 50 mg at 02/08/25 0811    [DISCONTINUED] morphine sulfate (ROXANOL) 20 mg/mL (HIGH CONC) soln 5 mg, 5 mg, Oral, Q2H PRN **OR** morphine sulfate (ROXANOL) 20 mg/mL (HIGH CONC) soln 5-10 mg, 5-10 mg, Oral, Q2H PRN, Antoinette Solorio PA-C, 10 mg at 01/28/25 0022    morphine sulfate (ROXANOL) 20 mg/mL (HIGH CONC) soln 7.5 mg, 7.5 mg, Oral, 5x Daily, Tino Wilhelm MD, 7.5 mg at 02/08/25 0806    multivitamin w/minerals (THERA-VIT-M) tablet 1 tablet, 1 tablet, Oral, Daily, Opal Bowman MD, 1 tablet at 02/08/25 0810    naloxone (NARCAN) injection 0.2 mg, 0.2 mg, Intravenous, Q2 Min PRN **OR** naloxone (NARCAN) injection 0.4 mg, 0.4 mg, Intravenous, Q2 Min PRN **OR** naloxone (NARCAN) injection 0.2 mg, 0.2 mg, Intramuscular, Q2 Min PRN **OR** naloxone (NARCAN) injection 0.4 mg, 0.4 mg, Intramuscular, Q2 Min PRN, Veronica Jauregui MD    ondansetron (ZOFRAN ODT) ODT tab 4 mg, 4 mg, Oral, Q6H PRN, David Saba MD    polyethylene glycol (MIRALAX) Packet 17 g, 17 g, Oral, BID PRN, Veronica Jauregui MD    potassium chloride santo ER (KLOR-CON M20) CR tablet 20 mEq, 20 mEq, Oral, BID, Opal Bowman MD, 20 mEq at 02/08/25 0810    predniSONE (DELTASONE) tablet 40 mg, 40 mg, Oral, Daily, 40 mg at 02/08/25 0810 **FOLLOWED BY** [START ON 2/11/2025] predniSONE  (DELTASONE) tablet 20 mg, 20 mg, Oral, Daily **FOLLOWED BY** [START ON 2/14/2025] predniSONE (DELTASONE) tablet 10 mg, 10 mg, Oral, Daily **FOLLOWED BY** [START ON 2/17/2025] predniSONE (DELTASONE) tablet 5 mg, 5 mg, Oral, Daily, Marce Anderson MD    rosuvastatin (CRESTOR) tablet 10 mg, 10 mg, Oral, Daily, Samanhta Niño MD, 10 mg at 02/08/25 0811    senna-docusate (SENOKOT-S/PERICOLACE) 8.6-50 MG per tablet 1 tablet, 1 tablet, Oral, BID PRN **OR** senna-docusate (SENOKOT-S/PERICOLACE) 8.6-50 MG per tablet 2 tablet, 2 tablet, Oral, BID PRN, Veronica Jauregui MD, 2 tablet at 01/11/25 1651    sodium chloride (PF) 0.9% PF flush 3 mL, 3 mL, Intracatheter, Q8H, Veronica Jauregui MD, 3 mL at 02/08/25 0812    sodium chloride (PF) 0.9% PF flush 3 mL, 3 mL, Intracatheter, q1 min prn, Veronica Jauregui MD, 3 mL at 02/04/25 0140    Vitamin D3 (CHOLECALCIFEROL) tablet 25 mcg, 25 mcg, Oral, Daily, Opal Bowman MD, 25 mcg at 02/08/25 0811    Physical Exam   Vital Signs: Temp: 98.3  F (36.8  C) Temp src: Oral BP: 115/57 Pulse: 67   Resp: 16 SpO2: 94 % O2 Device: Nasal cannula Oxygen Delivery: 1 LPM  Weight: 155 lbs 14.4 oz  General appearence: awake alert  , some shortness of breath  but in  no apparent distress    RESPIRATORY:on 1 l of oxygen, diminished breath sounds on the bilateral lower lungs      CARDIOVASCULAR:S1 S2 regular rate and rhythm,  GASTROINTESTINAL:soft, non-distended , non-tender , + bowel sounds,    SKIN: warm and dry, no mottling noted   NEUROLOGIC; awake alert and oriented,   EXTREMITIES: no clubbing, cyanosis or edema       Data   CBC RESULTS:   Recent Labs   Lab Test 01/31/25  0808   WBC 5.9   RBC 3.29*   HGB 10.3*   HCT 30.8*   MCV 94   MCH 31.3   MCHC 33.4   RDW 16.5*   PLT 85*     Last Comprehensive Metabolic Panel:  Lab Results   Component Value Date     02/07/2025    POTASSIUM 4.3 02/07/2025    CHLORIDE 101 02/07/2025    CO2 28 02/07/2025    ANIONGAP 10 02/07/2025     (H)  02/07/2025    BUN 19.2 02/07/2025    CR 0.81 02/07/2025    GFRESTIMATED >90 02/07/2025    KARLIE 9.3 02/07/2025     CT Chest w/o Contrast   Final Result   IMPRESSION:   1. Increased left greater than right lower lobe peribronchovascular   consolidative and groundglass opacities with tree-in-bud nodularity,   which may be secondary to an infectious/inflammatory etiology,   including aspiration pneumonitis versus atypical pneumonia. Recommend   follow-up chest CT in 3 months.   2. Bilateral sub-6 mm solid pulmonary nodules. Recommend attention on   follow-up.   3. Cholelithiasis without evidence of acute cholecystitis.      I have personally reviewed the examination and initial interpretation   and I agree with the findings.      JUAN RUELAS MD            SYSTEM ID:  [id number]      XR Chest 2 Views   Final Result   IMPRESSION: Improved left base atelectasis/consolidation. No new   opacities.      I have personally reviewed the examination and initial interpretation   and I agree with the findings.      SHAN ROSALES MD            SYSTEM ID:  [id number]      XR Chest 2 Views   Final Result   IMPRESSION: No significant changes mammographically from 2 weeks ago.   Atherosclerosis as well.      DAVID RUIZ MD            SYSTEM ID:  [id number]      XR Chest 2 Views   Final Result   IMPRESSION: Unchanged atelectasis, mild edema or other airway   inflammation an osteoporotic mid thoracic compression deformities   unchanged. No acute consolidation or new dominant pleural effusion.      DAVID RUIZ MD            SYSTEM ID:  [id number]      XR Video Swallow with SLP or OT   Final Result   Impression:   1. Two episodes of valorie silent aspiration with thin liquid barium,   otherwise normal thin liquid trials with occasional transient flash   penetration.    2. Please see the speech pathologist report for further details.         TESFAYE MOONEY DO            SYSTEM ID:  [id number]      XR Chest 2 Views   Final  Result   Impression:    1. Decrease of right perihilar opacity. Increased left lower lobe   pulmonary opacities.   2. Unchanged linear density of the right upper lobe, likely relating   to fibrosis versus atelectasis.      I have personally reviewed the examination and initial interpretation   and I agree with the findings.      SILVANO GIANG MD            SYSTEM ID:  [id number]      CT Chest Pulmonary Embolism w Contrast   Final Result   IMPRESSION:    1. Exam is negative for acute pulmonary embolism.    2. Waxing and waning pulmonary nodularity with a new 8 mm nodular   focus in the left lower lobe. Findings are likely secondary to a   chronic infectious/inflammatory process including aspiration. Consider   short-term follow-up CT in one month for reassessment.   3. Additional sub-6 mm pulmonary nodules are stable compared to prior.   4. Moderate to severe emphysematous changes of the lung parenchyma   with chronic fibrotic changes along the right minor fissure.         In the event of a positive result for acute pulmonary embolism   resulting in right heart strain, consider calling the    [facility] hospital  for PERT (Pulmonary Embolism Response Team)   Activation?      PERT -- Pulmonary Embolism Response Team (Multidisciplinary team   including cardiology, interventional radiology, critical care,   hematology)      I have personally reviewed the examination and initial interpretation   and I agree with the findings.      TERESITA BUSTAMANTE MD            SYSTEM ID:  [id number]      Chest XR,  PA & LAT   Final Result   IMPRESSION:   1. Unchanged right upper lobe lung fibrosis with resultant volume loss   and hemidiaphragm elevation.   2. No focal airspace opacities.      I have personally reviewed the examination and initial interpretation   and I agree with the findings.      JUAN RUELAS MD            SYSTEM ID:  [id number]

## 2025-02-08 NOTE — PROGRESS NOTES
"[facility]    Medicine Progress Note - Hospitalist Service, GOLD TEAM 18    Date of Admission:  1/4/2025    Assessment & Plan     Patient is a 78 y/o man who has a past medical history significant for thyroid cancer with lung metastases, Parkinson's disease, neuropathy, glaucoma, osteoporosis, chronic pain, severe COPD, hyperlipidemia, obstructive sleep apnea and coronary artery disease. Patient presented on 1/04/2025 with a 7 day history of productive cough and a 1 day history of weakness. CT pulmonary angiogram was negative for pulmonary embolism. Patient as admitted with  COPD exacerbation  and possible  community-acquired pneumonia as well.      Patient reportedly saw Pulmonology in Oct-2024 and COPD was moderately well-controlled at that time. Activity was apparently limited much more by his balance and parkinsonism than by respiratory status. Patient has antibiotics and prednisone available for outpatient COPD flares but apparently has not recently used this.      Patient apparently just entered hospice in the past week prior to hospital admission. Patient stated on admission that he was too weak and he and his daughter called their hospice agency and 911 was called and he was brought into the hospital. When provider asked on day of presentation whether patient wished for hospitalization and treatment of his potential infection, patient stated that he was seeking treatment and reported that his goal was to \"be able to do things for myself\". It was discussed that hospice typically focused on comfort rather than diagnostic testing and aggressive medical treatment. Patient acknowledged this but had seemed unsure of what his true desires for his care are moving forward.      Patient was on ceftriaxone from 1/04 - 1/08.  azithromycin 500 mg from 1/05-1/07-,  zosyn 1/09-1/ 14- now on every other day       Patient's overall prognosis is poor. Patient has been seen by " palliative care      2/7  No acute events over night.  Patient was continued to have shortness of breath.  Palliative care had seen the patient and patient did not want to be DNR at this moment and wanted to pursue aggressive restorative therapy.  I did start him on tapering dose of steroid as recommended starting with 40 mg for 3 days and 20 mg for 5 days and 10 mg for 3 days then on prednisone 5 mg daily afterwards  Patient is noted to have low platelet count  No any bleeding diathesis.  Pneumonia, CT showed bilateral opacities which is suspicious for atypical bacterial pneumonia.  Patient has been on azithromycin 500 mg daily as suggested by pulmonary medicine.  However patient felt that he might benefit from IV antibiotic therapy.  Discussed with palliative care about the goals of care discussion.  Patient wanted to pursue aggressive restorative therapy.  I elected to give him azithromycin 500 mg IV for a few more days and switch back to the oral  Sepsis   - completed antibiotics  - on admission felt to have sepsis   - Patient initially was treated for a possible community-acquired pneumonia on admission with ceftriaxone and azithromycin.  - Patient subsequently was treated for possible hospital-acquired pneumonia with zosyn.     COPD exacerbation, O2 and steroid dependent   Emphysema   Chronic hypoxemic respiratory failure  - shortness of breath  with minimal exertion  - sees pulmonary as out patient , has not been interested in pulmonary rehab   - recent prolonged hospitalization with COPD exacerbation    -PFTs of 3/24/2023 showing FEV1/FVC of 1.3/2.4 (46/65% predicted, respectively) for ratio 54%. TLC is reduced at 5.1 (74% predicted) and DLCO uncorrected is very low at 10.1 (41% predicted).   - was on prednisone 40 mg daily from 1/04/2025 to 1/08/2025. Patient received prednisone 20 mg once on 1/09- then  methylprednisolone 40 mg IV every 8 hours from 1/09- 1/13 and is no longer on steroids  - 1/28  started  "prednisone 40 mg x 5 days then 2 mg daily as he had been on this  those for a while prior and suspect he is steroid dependent , state she was taking the prednisone 2 mg pre admission   - uses Trelegy and getting it here   - on O2 1-2 litres at baseline.  - CT pulmonary angio 1/4 had no pulmonary embolism  . \"Waxing and waning pulmonary nodularity with a new 8 mm nodular focus in the left lower lobe. Findings are likely secondary to a chronic infectious/inflammatory process including aspiration. Consider short-term follow-up CT in one month for reassessment. 3. Additional sub-6 mm pulmonary nodules are stable compared to prior. 4. Moderate to severe emphysematous changes of the lung parenchyma with chronic fibrotic changes along the right minor fissure.\"  - Patient patient's daughter, patient may be placed on BiPAP if needed and per daughter he had been on BIPAP intermittently that helped with his breathing   - suspect has element of pulmonary hypertension , on lasix, did increase to bid 1/30       Coronary artery disease with past MI s/p stent Cx  1997  placement  HDL   - at time of initiall stent placement he was noted to have 80-90% lesion in nondominant RCA  and 50 % LAD that was treated medically   - Patient usually on aspirin and crestor , restarted aspirin   - Patient continuing metoprolol succinate 50 mg daily.       Hypertension  - continue PTA metoprolol succinate.  - continue lasix , increased to bid as above 1/30 , also increased Kcl     History of traumatic  subdural hemorrhage in 2023  - since resolved per NS follow up      Parkinson's disease  - recently diagnosed   -Has seen neurology and on sinemet and continue     Frequent falls  - with left femur fracture and inf pubic rami fracture, also history rib fracture        Metastatic thyroid cancer (multifocal papillary carcinoma)   Status post  thyroidectomy 2021   also received radioactive iodine   - had lymph node metastases,.  - Further surveillance " as outpatient.  - continue levothyroxine         Right upper lobe lung mass,  s/p radiation therapy with possible radiation pneumonitis:  -  had previous VATS biopsy in Feb-2022 that was non-diagnostic.     New lung nodule in left lower lobe  - follow up  pulmonary         Thrombocytopenia  -  last plt count was 72K 1/14, then went to  54K ---59K ---85K, was low on admission  - he denies alcohol consumption  - cont to monitor   - restarted ASA    Platelet count dropped to 14,000 on 12/16.  Aspirin 81 mg held due to severe thrombocytopenia.  There is no any bleeding diathesis at this moment.       Rheumatoid arthritis with positive rheumatoid factor:  - Per chart review, patient had once been on methotrexate and prednisone but had stopped these medications a few years ago.  - Patient to f/u as outpatient if desired.     Osteopenia   -had been on calcium and vit D       History of immunodeficency  - IG subclass 2      Hypernatremia  - improved:      Neuropathy   chronic pain syndrome:  - Patient currently on gabapentin 400 mg three times a day.  --his  PTA robaxin 500mg TID has been on hold   -Hold PTA trazadone 50mg tab QHS  -Continue PTA oxycodone 5mg Q6H for pain      Glaucoma:  Dry eyes    - Patient not currently on medication for this.  - Patient to f/u with Ophthalmology as outpatient if further treatment is desired.       osteoporosis  h/o right femoral neck fracture s/p right total hip replacement   -hold PTA denosumab 60mg injection every 6 months (last 10/25/2024)  -restarted  PTA calcium , vitamin D  - prolia q 6 mo          Moderate malnutrition in the context of acute vs. chronic illness:  - Supplementing as able.   Cognitive impairment.  Palliative care is questioning patient's decision-making capacity and recommending OT consult for SLUMS/MOCA     Diet: Regular Diet Adult Thin Liquids (level 0)  Snacks/Supplements Adult: Ensure Enlive; With Meals    DVT Prophylaxis: consider subcutaneous heparin   Mir  Catheter: Not present  Lines: None     Cardiac Monitoring: None  Code Status: No CPR- Do NOT Intubate      Clinically Significant Risk Factors                   # Hypertension: Noted on problem list     # Acute Hypoxic Respiratory Failure: Documented O2 saturation < 90%. Continue supplemental oxygen as needed          # Moderate Malnutrition: based on nutrition assessment    # Financial/Environmental Concerns:           Social Drivers of Health    Tobacco Use: Medium Risk (10/18/2024)    Patient History     Smoking Tobacco Use: Former     Smokeless Tobacco Use: Never   Physical Activity: Inactive (10/14/2024)    Exercise Vital Sign     Days of Exercise per Week: 0 days     Minutes of Exercise per Session: 0 min   Stress: Stress Concern Present (10/14/2024)    [employer] - Occupational Stress Questionnaire     Feeling of Stress : Very much   Social Connections: Unknown (10/14/2024)    Social Connection and Isolation Panel [NHANES]     Frequency of Social Gatherings with Friends and Family: More than three times a week          Disposition Plan   Discharge planning based on progress.             Marce Anderson MD  Hospitalist Service, [facility] 18  [facility]  Securely message with [employer] (more info)  Text page via [facility] Paging/Directory   See signed in provider for up to date coverage information  ______________________________________________________________________    Interval History   He  cannot really tell if breathing is better, he seems to be speaking a bit better but still seems to have significant shortness of breath  , when not talking breathing is calm   Remains afebrile.  No any vomiting or diarrhea    Current Facility-Administered Medications:     acetaminophen (TYLENOL) tablet 650 mg, 650 mg, Oral, Q4H PRN, David Saba MD    amLODIPine (NORVASC) tablet 5 mg, 5 mg, Oral, At Bedtime, Samantha Niño MD, 5 mg at 01/31/25  2237    [Held by provider] aspirin EC tablet 81 mg, 81 mg, Oral, BID, Opal Bowman MD, 81 mg at 02/07/25 0813    azithromycin (ZITHROMAX) 500 mg in  mL intermittent infusion, 500 mg, Intravenous, Q24H, Marce Anderson MD, 500 mg at 02/07/25 1530    [Held by provider] azithromycin (ZITHROMAX) tablet 500 mg, 500 mg, Oral, Every Other Day, Samantha Niño MD, 500 mg at 02/05/25 0843    calcium carbonate (OS-KARLIE) tablet 600 mg, 600 mg, Oral, Daily, Opal Bowman MD, 600 mg at 02/08/25 0810    carbidopa-levodopa (SINEMET)  MG per tablet 1 tablet, 1 tablet, Oral, TID, 1 tablet at 02/08/25 0812 **AND** carbidopa-levodopa half-tab 12.5-50 mg, 1 half-tab, Oral, TID, Veronica Jauregui MD, 1 half-tab at 02/08/25 0810    carboxymethylcellulose PF (REFRESH PLUS) 0.5 % ophthalmic solution 1 drop, 1 drop, Both Eyes, Q1H PRN, Veronica Jauregui MD, 1 drop at 01/25/25 1945    Fluticasone-Umeclidin-Vilanterol (TRELEGY ELLIPTA) 200-62.5-25 MCG/ACT oral inhaler 1 puff, 1 puff, Inhalation, Daily, Gonzalez Watt MD, 1 puff at 02/08/25 0812    furosemide (LASIX) tablet 20 mg, 20 mg, Oral, BID, Opal Bowman MD, 20 mg at 02/08/25 0811    gabapentin (NEURONTIN) capsule 400 mg, 400 mg, Oral, TID, David Saba MD, 400 mg at 02/08/25 0810    guaiFENesin (MUCINEX) 12 hr tablet 600 mg, 600 mg, Oral, BID, Tino Wilhelm MD, 600 mg at 02/08/25 0811    ipratropium - albuterol 0.5 mg/2.5 mg/3 mL (DUONEB) neb solution 3 mL, 3 mL, Nebulization, Q4H PRN, Samantha Niño MD, 3 mL at 02/07/25 1856    ipratropium - albuterol 0.5 mg/2.5 mg/3 mL (DUONEB) neb solution 3 mL, 3 mL, Nebulization, 4x daily, Veronica Jauregui MD, 3 mL at 02/08/25 1228    levothyroxine (SYNTHROID/LEVOTHROID) tablet 112 mcg, 112 mcg, Oral, Daily, Veronica Jauregui MD, 112 mcg at 02/08/25 0811    lidocaine (LMX4) cream, , Topical, Q1H PRN, Veronica Jauregui MD    lidocaine 1 % 0.1-1 mL, 0.1-1 mL, Other, Q1H PRN, Veronica Jauregui MD     loperamide (IMODIUM) capsule 2 mg, 2 mg, Oral, 4x Daily PRN, Gonzalez Watt MD    LORazepam (ATIVAN) half-tab 0.25 mg, 0.25 mg, Oral, Q6H PRN, Antoinette Solorio PA-C, 0.25 mg at 02/03/25 1417    melatonin tablet 5 mg, 5 mg, Oral, At Bedtime PRN, David Saba MD    methocarbamol (ROBAXIN) tablet 500 mg, 500 mg, Oral, 4x Daily PRN, Antoinette Solorio PA-C    metoprolol succinate ER (TOPROL XL) 24 hr tablet 50 mg, 50 mg, Oral, Daily, David Saba MD, 50 mg at 02/08/25 0811    [DISCONTINUED] morphine sulfate (ROXANOL) 20 mg/mL (HIGH CONC) soln 5 mg, 5 mg, Oral, Q2H PRN **OR** morphine sulfate (ROXANOL) 20 mg/mL (HIGH CONC) soln 5-10 mg, 5-10 mg, Oral, Q2H PRN, Antoinette Solorio PA-C, 10 mg at 01/28/25 0022    morphine sulfate (ROXANOL) 20 mg/mL (HIGH CONC) soln 7.5 mg, 7.5 mg, Oral, 5x Daily, iTno Wilhelm MD, 7.5 mg at 02/08/25 1123    multivitamin w/minerals (THERA-VIT-M) tablet 1 tablet, 1 tablet, Oral, Daily, Opal Bowman MD, 1 tablet at 02/08/25 0810    naloxone (NARCAN) injection 0.2 mg, 0.2 mg, Intravenous, Q2 Min PRN **OR** naloxone (NARCAN) injection 0.4 mg, 0.4 mg, Intravenous, Q2 Min PRN **OR** naloxone (NARCAN) injection 0.2 mg, 0.2 mg, Intramuscular, Q2 Min PRN **OR** naloxone (NARCAN) injection 0.4 mg, 0.4 mg, Intramuscular, Q2 Min PRN, Veronica Jauregui MD    ondansetron (ZOFRAN ODT) ODT tab 4 mg, 4 mg, Oral, Q6H PRN, David Saba MD    polyethylene glycol (MIRALAX) Packet 17 g, 17 g, Oral, BID PRN, Veronica Jauregui MD    potassium chloride santo ER (KLOR-CON M20) CR tablet 20 mEq, 20 mEq, Oral, BID, Opal Bowman MD, 20 mEq at 02/08/25 0810    predniSONE (DELTASONE) tablet 40 mg, 40 mg, Oral, Daily, 40 mg at 02/08/25 0810 **FOLLOWED BY** [START ON 2/11/2025] predniSONE (DELTASONE) tablet 20 mg, 20 mg, Oral, Daily **FOLLOWED BY** [START ON 2/14/2025] predniSONE (DELTASONE) tablet 10 mg, 10 mg, Oral, Daily **FOLLOWED BY** [START ON 2/17/2025] predniSONE (DELTASONE) tablet 5  mg, 5 mg, Oral, Daily, Marce Anderson MD    rosuvastatin (CRESTOR) tablet 10 mg, 10 mg, Oral, Daily, Samantha Niño MD, 10 mg at 02/08/25 0811    senna-docusate (SENOKOT-S/PERICOLACE) 8.6-50 MG per tablet 1 tablet, 1 tablet, Oral, BID PRN **OR** senna-docusate (SENOKOT-S/PERICOLACE) 8.6-50 MG per tablet 2 tablet, 2 tablet, Oral, BID PRN, Veronica Jauregui MD, 2 tablet at 01/11/25 1651    sodium chloride (PF) 0.9% PF flush 3 mL, 3 mL, Intracatheter, Q8H, Veronica Jauregui MD, 3 mL at 02/08/25 0812    sodium chloride (PF) 0.9% PF flush 3 mL, 3 mL, Intracatheter, q1 min prn, Veronica Jauregui MD, 3 mL at 02/04/25 0140    Vitamin D3 (CHOLECALCIFEROL) tablet 25 mcg, 25 mcg, Oral, Daily, Opal Bowman MD, 25 mcg at 02/08/25 0811    Physical Exam   Vital Signs: Temp: 98.3  F (36.8  C) Temp src: Oral BP: 115/57 Pulse: 67   Resp: 16 SpO2: 94 % O2 Device: Nasal cannula Oxygen Delivery: 1 LPM  Weight: 155 lbs 14.4 oz  General appearence: awake alert  , some shortness of breath  but in  no apparent distress    RESPIRATORY:on 1 l of oxygen, diminished breath sounds on the bilateral lower lungs      CARDIOVASCULAR:S1 S2 regular rate and rhythm,  GASTROINTESTINAL:soft, non-distended , non-tender , + bowel sounds,    SKIN: warm and dry, no mottling noted   NEUROLOGIC; awake alert and oriented,   EXTREMITIES: no clubbing, cyanosis or edema       Data   CBC RESULTS:   Recent Labs   Lab Test 01/31/25  0808   WBC 5.9   RBC 3.29*   HGB 10.3*   HCT 30.8*   MCV 94   MCH 31.3   MCHC 33.4   RDW 16.5*   PLT 85*     Last Comprehensive Metabolic Panel:  Lab Results   Component Value Date     02/07/2025    POTASSIUM 4.3 02/07/2025    CHLORIDE 101 02/07/2025    CO2 28 02/07/2025    ANIONGAP 10 02/07/2025     (H) 02/07/2025    BUN 19.2 02/07/2025    CR 0.81 02/07/2025    GFRESTIMATED >90 02/07/2025    KARLIE 9.3 02/07/2025     CT Chest w/o Contrast   Final Result   IMPRESSION:   1. Increased left greater than right lower  lobe peribronchovascular   consolidative and groundglass opacities with tree-in-bud nodularity,   which may be secondary to an infectious/inflammatory etiology,   including aspiration pneumonitis versus atypical pneumonia. Recommend   follow-up chest CT in 3 months.   2. Bilateral sub-6 mm solid pulmonary nodules. Recommend attention on   follow-up.   3. Cholelithiasis without evidence of acute cholecystitis.      I have personally reviewed the examination and initial interpretation   and I agree with the findings.      JUAN RUELAS MD            SYSTEM ID:  [id number]      XR Chest 2 Views   Final Result   IMPRESSION: Improved left base atelectasis/consolidation. No new   opacities.      I have personally reviewed the examination and initial interpretation   and I agree with the findings.      SHAN ROSALES MD            SYSTEM ID:  [id number]      XR Chest 2 Views   Final Result   IMPRESSION: No significant changes mammographically from 2 weeks ago.   Atherosclerosis as well.      DAVID RUIZ MD            SYSTEM ID:  [id number]      XR Chest 2 Views   Final Result   IMPRESSION: Unchanged atelectasis, mild edema or other airway   inflammation an osteoporotic mid thoracic compression deformities   unchanged. No acute consolidation or new dominant pleural effusion.      DAVID RUIZ MD            SYSTEM ID:  [id number]      XR Video Swallow with SLP or OT   Final Result   Impression:   1. Two episodes of valorie silent aspiration with thin liquid barium,   otherwise normal thin liquid trials with occasional transient flash   penetration.    2. Please see the speech pathologist report for further details.         TESFAYE MOONEY DO            SYSTEM ID:  [id number]      XR Chest 2 Views   Final Result   Impression:    1. Decrease of right perihilar opacity. Increased left lower lobe   pulmonary opacities.   2. Unchanged linear density of the right upper lobe, likely relating   to fibrosis versus  atelectasis.      I have personally reviewed the examination and initial interpretation   and I agree with the findings.      SILVANO GIANG MD            SYSTEM ID:  [id number]      CT Chest Pulmonary Embolism w Contrast   Final Result   IMPRESSION:    1. Exam is negative for acute pulmonary embolism.    2. Waxing and waning pulmonary nodularity with a new 8 mm nodular   focus in the left lower lobe. Findings are likely secondary to a   chronic infectious/inflammatory process including aspiration. Consider   short-term follow-up CT in one month for reassessment.   3. Additional sub-6 mm pulmonary nodules are stable compared to prior.   4. Moderate to severe emphysematous changes of the lung parenchyma   with chronic fibrotic changes along the right minor fissure.         In the event of a positive result for acute pulmonary embolism   resulting in right heart strain, consider calling the    [facility] hospital  for PERT (Pulmonary Embolism Response Team)   Activation?      PERT -- Pulmonary Embolism Response Team (Multidisciplinary team   including cardiology, interventional radiology, critical care,   hematology)      I have personally reviewed the examination and initial interpretation   and I agree with the findings.      TERESITA BUSTAMANTE MD            SYSTEM ID:  [id number]      Chest XR,  PA & LAT   Final Result   IMPRESSION:   1. Unchanged right upper lobe lung fibrosis with resultant volume loss   and hemidiaphragm elevation.   2. No focal airspace opacities.      I have personally reviewed the examination and initial interpretation   and I agree with the findings.      JUAN RUELAS MD            SYSTEM ID:  [id number]

## 2025-02-09 PROCEDURE — 999N000157 HC STATISTIC RCP TIME EA 10 MIN

## 2025-02-09 PROCEDURE — 94640 AIRWAY INHALATION TREATMENT: CPT | Mod: 76

## 2025-02-09 PROCEDURE — 99232 SBSQ HOSP IP/OBS MODERATE 35: CPT | Performed by: INTERNAL MEDICINE

## 2025-02-09 PROCEDURE — 250N000013 HC RX MED GY IP 250 OP 250 PS 637

## 2025-02-09 PROCEDURE — 250N000013 HC RX MED GY IP 250 OP 250 PS 637: Performed by: INTERNAL MEDICINE

## 2025-02-09 PROCEDURE — 250N000009 HC RX 250

## 2025-02-09 PROCEDURE — 250N000013 HC RX MED GY IP 250 OP 250 PS 637: Performed by: STUDENT IN AN ORGANIZED HEALTH CARE EDUCATION/TRAINING PROGRAM

## 2025-02-09 PROCEDURE — 120N000002 HC R&B MED SURG/OB UMMC

## 2025-02-09 PROCEDURE — 250N000012 HC RX MED GY IP 250 OP 636 PS 637: Performed by: INTERNAL MEDICINE

## 2025-02-09 RX ADMIN — Medication 1 HALF-TAB: at 14:13

## 2025-02-09 RX ADMIN — CARBIDOPA AND LEVODOPA 1 TABLET: 25; 100 TABLET ORAL at 14:13

## 2025-02-09 RX ADMIN — AMLODIPINE BESYLATE 5 MG: 5 TABLET ORAL at 21:34

## 2025-02-09 RX ADMIN — MORPHINE SULFATE 7.5 MG: 20 SOLUTION ORAL at 21:33

## 2025-02-09 RX ADMIN — IPRATROPIUM BROMIDE AND ALBUTEROL SULFATE 3 ML: .5; 3 SOLUTION RESPIRATORY (INHALATION) at 19:46

## 2025-02-09 RX ADMIN — FUROSEMIDE 20 MG: 20 TABLET ORAL at 08:09

## 2025-02-09 RX ADMIN — LEVOTHYROXINE SODIUM 112 MCG: 112 TABLET ORAL at 08:09

## 2025-02-09 RX ADMIN — ROSUVASTATIN 10 MG: 10 TABLET, FILM COATED ORAL at 08:08

## 2025-02-09 RX ADMIN — GUAIFENESIN 600 MG: 600 TABLET ORAL at 19:06

## 2025-02-09 RX ADMIN — POTASSIUM CHLORIDE 20 MEQ: 1500 TABLET, EXTENDED RELEASE ORAL at 08:09

## 2025-02-09 RX ADMIN — METOPROLOL SUCCINATE 50 MG: 50 TABLET, EXTENDED RELEASE ORAL at 08:08

## 2025-02-09 RX ADMIN — IPRATROPIUM BROMIDE AND ALBUTEROL SULFATE 3 ML: .5; 3 SOLUTION RESPIRATORY (INHALATION) at 12:20

## 2025-02-09 RX ADMIN — MORPHINE SULFATE 7.5 MG: 20 SOLUTION ORAL at 14:11

## 2025-02-09 RX ADMIN — GABAPENTIN 400 MG: 300 CAPSULE ORAL at 19:06

## 2025-02-09 RX ADMIN — Medication 600 MG: at 08:09

## 2025-02-09 RX ADMIN — MORPHINE SULFATE 7.5 MG: 20 SOLUTION ORAL at 07:59

## 2025-02-09 RX ADMIN — Medication 1 TABLET: at 08:09

## 2025-02-09 RX ADMIN — Medication 1 HALF-TAB: at 19:06

## 2025-02-09 RX ADMIN — MORPHINE SULFATE 7.5 MG: 20 SOLUTION ORAL at 17:34

## 2025-02-09 RX ADMIN — CARBIDOPA AND LEVODOPA 1 TABLET: 25; 100 TABLET ORAL at 08:09

## 2025-02-09 RX ADMIN — CARBIDOPA AND LEVODOPA 1 TABLET: 25; 100 TABLET ORAL at 19:06

## 2025-02-09 RX ADMIN — GUAIFENESIN 600 MG: 600 TABLET ORAL at 08:09

## 2025-02-09 RX ADMIN — Medication 1 HALF-TAB: at 08:09

## 2025-02-09 RX ADMIN — POTASSIUM CHLORIDE 20 MEQ: 1500 TABLET, EXTENDED RELEASE ORAL at 19:06

## 2025-02-09 RX ADMIN — FUROSEMIDE 20 MG: 20 TABLET ORAL at 15:35

## 2025-02-09 RX ADMIN — Medication 25 MCG: at 08:09

## 2025-02-09 RX ADMIN — IPRATROPIUM BROMIDE AND ALBUTEROL SULFATE 3 ML: .5; 3 SOLUTION RESPIRATORY (INHALATION) at 09:48

## 2025-02-09 RX ADMIN — PREDNISONE 40 MG: 20 TABLET ORAL at 08:09

## 2025-02-09 RX ADMIN — IPRATROPIUM BROMIDE AND ALBUTEROL SULFATE 3 ML: .5; 3 SOLUTION RESPIRATORY (INHALATION) at 16:28

## 2025-02-09 RX ADMIN — GABAPENTIN 400 MG: 300 CAPSULE ORAL at 14:13

## 2025-02-09 RX ADMIN — GABAPENTIN 400 MG: 300 CAPSULE ORAL at 08:08

## 2025-02-09 RX ADMIN — AZITHROMYCIN DIHYDRATE 500 MG: 250 TABLET ORAL at 08:08

## 2025-02-09 RX ADMIN — MORPHINE SULFATE 7.5 MG: 20 SOLUTION ORAL at 11:11

## 2025-02-09 ASSESSMENT — ACTIVITIES OF DAILY LIVING (ADL)
ADLS_ACUITY_SCORE: 45
ADLS_ACUITY_SCORE: 42
ADLS_ACUITY_SCORE: 42
ADLS_ACUITY_SCORE: 45
ADLS_ACUITY_SCORE: 42
ADLS_ACUITY_SCORE: 45
ADLS_ACUITY_SCORE: 42
ADLS_ACUITY_SCORE: 45
ADLS_ACUITY_SCORE: 42
ADLS_ACUITY_SCORE: 45
ADLS_ACUITY_SCORE: 45
ADLS_ACUITY_SCORE: 42
ADLS_ACUITY_SCORE: 45

## 2025-02-09 NOTE — PLAN OF CARE
VS: /72 (Patient Position: Fowlers, Cuff Size: Adult Regular)   Pulse 60   Temp 98.3  F (36.8  C) (Oral)   Resp 19   Wt 70.7 kg (155 lb 14.4 oz)   SpO2 94%   BMI 23.36 kg/m    Refused to be on continuous pulse ox, monitored manually frequently    O2: On 1 LPM via nasal cannula   Dyspnea upon exertion   Output: Continent both bowel and bladder   Last BM: 2-9-2025   Activity: SBA with 4WW   Skin: Blanchable redness on cheeks  Scattered bruising and scab on BUE   Pain: On scheduled morphine   CMS: A&Ox4, with baseline numbness and tingling on BLE   Dressing: R forearm CDI   Diet: Regular diet   LDA: PIV on R forearm saline locked   Equipment: Personal items, call light   Plan: Pending LTC placement   Additional Info:

## 2025-02-09 NOTE — PLAN OF CARE
Goal Outcome Evaluation:      Plan of Care Reviewed With: patient    Overall Patient Progress: no change    VS: /66 (BP Location: Left arm)   Pulse 73   Temp 98.6  F (37  C) (Oral)   Resp 16   Wt 70.9 kg (156 lb 6.4 oz)   SpO2 95%   BMI 23.43 kg/m     O2: O2 sats maintained with 1L NC  SOB with exertion   Output: Continent of B&B   Last BM: 2/9   Activity: SBA with walker   Skin: Scattered bruising and scabbing on BLE  Blanchable redness to cheeks under NC, mepilex applied   Pain: Controlled with scheduled morphine and gabapentin   CMS: A&Ox4, baseline N&T to BLE   Dressing: R forearm mepilex CDI   Diet: regular   LDA: R PIV SL   Equipment: Personal walker, personal belongings   Plan: Pending LTC placement   Additional Info: Patient able to make needs known using call light appropriately, call light in reach, continue POC, report given to oncoming RN

## 2025-02-09 NOTE — PLAN OF CARE
Goal Outcome Evaluation:                        VS: VSS and afebrile    O2: Sats > 93% with 1L of O2 via NS   Pt refused cont pulse os, eduction given-cont to refused  Spot check done with ambulatory pulse Ox  Still complained of dyspnea with exertion   Output: Voids without difficulty    Last BM: 2/6, senna PRN given 1 tab    Activity: Up with SBA with rolling walker    Skin: Scattered bruises in upper and lower extremities   Pain: C/o lower back pain: decline PRN, pt only wanted schedule Morphine    Neuro: Alert and oriented x4  Baseline neuropathy in BLEs    Dressing: Mepilex in R forearm: CDI    Diet: Regular with thin liquids,pt loves ice chips    LDA: R lower FA-PIV    Equipment: Eyeglasses, personal belongings at bedside    Plan: Waiting for LTC, [facility]   Additional Info:

## 2025-02-09 NOTE — PROGRESS NOTES
"[facility]    Medicine Progress Note - Hospitalist Service, GOLD TEAM 18    Date of Admission:  1/4/2025    Assessment & Plan     Patient is a 78 y/o man who has a past medical history significant for thyroid cancer with lung metastases, Parkinson's disease, neuropathy, glaucoma, osteoporosis, chronic pain, severe COPD, hyperlipidemia, obstructive sleep apnea and coronary artery disease. Patient presented on 1/04/2025 with a 7 day history of productive cough and a 1 day history of weakness. CT pulmonary angiogram was negative for pulmonary embolism. Patient as admitted with  COPD exacerbation  and possible  community-acquired pneumonia as well.      Patient reportedly saw Pulmonology in Oct-2024 and COPD was moderately well-controlled at that time. Activity was apparently limited much more by his balance and parkinsonism than by respiratory status. Patient has antibiotics and prednisone available for outpatient COPD flares but apparently has not recently used this.      Patient apparently just entered hospice in the past week prior to hospital admission. Patient stated on admission that he was too weak and he and his daughter called their hospice agency and 911 was called and he was brought into the hospital. When provider asked on day of presentation whether patient wished for hospitalization and treatment of his potential infection, patient stated that he was seeking treatment and reported that his goal was to \"be able to do things for myself\". It was discussed that hospice typically focused on comfort rather than diagnostic testing and aggressive medical treatment. Patient acknowledged this but had seemed unsure of what his true desires for his care are moving forward.      Patient was on ceftriaxone from 1/04 - 1/08.  azithromycin 500 mg from 1/05-1/07-,  zosyn 1/09-1/ 14- now on every other day       Patient's overall prognosis is poor. Patient has been seen by " palliative care      2/9  Patient feels somewhat better after the Prednisone Burst started. He is currently on 1 l of oxygen but asked to be placed on 2 L as he could not speak with full sentence due to dyspnea.  Palliative care had seen the patient and patient did not want to be DNR at this moment and wanted to pursue aggressive restorative therapy.  I did start him on tapering dose of steroid as recommended starting with 40 mg for 3 days and 20 mg for 5 days and 10 mg for 3 days then on prednisone 5 mg daily afterwards  Patient is noted to have low platelet count  No any bleeding diathesis.  Pneumonia, CT showed bilateral opacities which is suspicious for atypical bacterial pneumonia.  Patient has been on azithromycin 500 mg daily as suggested by pulmonary medicine.  However patient felt that he might benefit from IV antibiotic therapy.  Discussed with palliative care about the goals of care discussion.  Patient wanted to pursue aggressive restorative therapy.  I elected to give him azithromycin 500 mg IV for a few more days and switch back to the oral  Sepsis   - completed antibiotics  - on admission felt to have sepsis   - Patient initially was treated for a possible community-acquired pneumonia on admission with ceftriaxone and azithromycin.  - Patient subsequently was treated for possible hospital-acquired pneumonia with zosyn.     COPD exacerbation, O2 and steroid dependent   Emphysema   Chronic hypoxemic respiratory failure  - shortness of breath  with minimal exertion  - sees pulmonary as out patient , has not been interested in pulmonary rehab   - recent prolonged hospitalization with COPD exacerbation    -PFTs of 3/24/2023 showing FEV1/FVC of 1.3/2.4 (46/65% predicted, respectively) for ratio 54%. TLC is reduced at 5.1 (74% predicted) and DLCO uncorrected is very low at 10.1 (41% predicted).   - was on prednisone 40 mg daily from 1/04/2025 to 1/08/2025. Patient received prednisone 20 mg once on 1/09- then  " methylprednisolone 40 mg IV every 8 hours from 1/09- 1/13 and is no longer on steroids  - 1/28  started prednisone 40 mg x 5 days then 2 mg daily as he had been on this  those for a while prior and suspect he is steroid dependent , state she was taking the prednisone 2 mg pre admission   - uses Trelegy and getting it here   - on O2 1-2 litres at baseline.  - CT pulmonary angio 1/4 had no pulmonary embolism  . \"Waxing and waning pulmonary nodularity with a new 8 mm nodular focus in the left lower lobe. Findings are likely secondary to a chronic infectious/inflammatory process including aspiration. Consider short-term follow-up CT in one month for reassessment. 3. Additional sub-6 mm pulmonary nodules are stable compared to prior. 4. Moderate to severe emphysematous changes of the lung parenchyma with chronic fibrotic changes along the right minor fissure.\"  - Patient patient's daughter, patient may be placed on BiPAP if needed and per daughter he had been on BIPAP intermittently that helped with his breathing   - suspect has element of pulmonary hypertension , on lasix, did increase to bid 1/30       Coronary artery disease with past MI s/p stent Cx  1997  placement  HDL   - at time of initiall stent placement he was noted to have 80-90% lesion in nondominant RCA  and 50 % LAD that was treated medically   - Patient usually on aspirin and crestor , restarted aspirin   - Patient continuing metoprolol succinate 50 mg daily.       Hypertension  - continue PTA metoprolol succinate.  - continue lasix , increased to bid as above 1/30 , also increased Kcl     History of traumatic  subdural hemorrhage in 2023  - since resolved per NS follow up      Parkinson's disease  - recently diagnosed   -Has seen neurology and on sinemet and continue     Frequent falls  - with left femur fracture and inf pubic rami fracture, also history rib fracture        Metastatic thyroid cancer (multifocal papillary carcinoma)   Status post  " thyroidectomy 2021   also received radioactive iodine   - had lymph node metastases,.  - Further surveillance as outpatient.  - continue levothyroxine         Right upper lobe lung mass,  s/p radiation therapy with possible radiation pneumonitis:  -  had previous VATS biopsy in Feb-2022 that was non-diagnostic.     New lung nodule in left lower lobe  - follow up  pulmonary         Thrombocytopenia  -  last plt count was 72K 1/14, then went to  54K ---59K ---85K, was low on admission  - he denies alcohol consumption  - cont to monitor   - restarted ASA    Platelet count dropped to 14,000 on 12/16.  Aspirin 81 mg held due to severe thrombocytopenia.  There is no any bleeding diathesis at this moment.       Rheumatoid arthritis with positive rheumatoid factor:  - Per chart review, patient had once been on methotrexate and prednisone but had stopped these medications a few years ago.  - Patient to f/u as outpatient if desired.     Osteopenia   -had been on calcium and vit D       History of immunodeficency  - IG subclass 2      Hypernatremia  - improved:      Neuropathy   chronic pain syndrome:  - Patient currently on gabapentin 400 mg three times a day.  --his  PTA robaxin 500mg TID has been on hold   -Hold PTA trazadone 50mg tab QHS  -Continue PTA oxycodone 5mg Q6H for pain      Glaucoma:  Dry eyes    - Patient not currently on medication for this.  - Patient to f/u with Ophthalmology as outpatient if further treatment is desired.       osteoporosis  h/o right femoral neck fracture s/p right total hip replacement   -hold PTA denosumab 60mg injection every 6 months (last 10/25/2024)  -restarted  PTA calcium , vitamin D  - prolia q 6 mo          Moderate malnutrition in the context of acute vs. chronic illness:  - Supplementing as able.   Cognitive impairment.  Palliative care is questioning patient's decision-making capacity and recommending OT consult for SLUMS/MOCA     Diet: Regular Diet Adult Thin Liquids (level  0)  Snacks/Supplements Adult: Ensure Enlive; With Meals    DVT Prophylaxis: consider subcutaneous heparin   Mir Catheter: Not present  Lines: None     Cardiac Monitoring: None  Code Status: No CPR- Do NOT Intubate      Clinically Significant Risk Factors                   # Hypertension: Noted on problem list     # Acute Hypoxic Respiratory Failure: Documented O2 saturation < 90%. Continue supplemental oxygen as needed          # Moderate Malnutrition: based on nutrition assessment    # Financial/Environmental Concerns:           Social Drivers of Health    Tobacco Use: Medium Risk (10/18/2024)    Patient History     Smoking Tobacco Use: Former     Smokeless Tobacco Use: Never   Physical Activity: Inactive (10/14/2024)    Exercise Vital Sign     Days of Exercise per Week: 0 days     Minutes of Exercise per Session: 0 min   Stress: Stress Concern Present (10/14/2024)    [employer] - Occupational Stress Questionnaire     Feeling of Stress : Very much   Social Connections: Unknown (10/14/2024)    Social Connection and Isolation Panel [NHANES]     Frequency of Social Gatherings with Friends and Family: More than three times a week          Disposition Plan   Waiting for LTC placement              Marce Anderson MD  Hospitalist Service, [address]  Securely message with iLink (more info)  Text page via [facility] Paging/Directory   See signed in provider for up to date coverage information  ______________________________________________________________________    Interval History   Pt seen and examined   He  cannot really tell if breathing is better, he seems to be speaking a bit better but still seems to have significant shortness of breath  , when not talking breathing is calm   Remains afebrile.  No any vomiting or diarrhea    Current Facility-Administered Medications:     acetaminophen (TYLENOL) tablet 650 mg, 650 mg, Oral, Q4H  PRN, David Saba MD    amLODIPine (NORVASC) tablet 5 mg, 5 mg, Oral, At Bedtime, Samantha Niño MD, 5 mg at 01/31/25 2237    [Held by provider] aspirin EC tablet 81 mg, 81 mg, Oral, BID, Opal Bowman MD, 81 mg at 02/07/25 0813    azithromycin (ZITHROMAX) tablet 500 mg, 500 mg, Oral, Every Other Day, Marce Anderson MD, 500 mg at 02/09/25 0808    calcium carbonate (OS-KARLIE) tablet 600 mg, 600 mg, Oral, Daily, Opal Bowman MD, 600 mg at 02/09/25 0809    carbidopa-levodopa (SINEMET)  MG per tablet 1 tablet, 1 tablet, Oral, TID, 1 tablet at 02/09/25 0809 **AND** carbidopa-levodopa half-tab 12.5-50 mg, 1 half-tab, Oral, TID, Veronica Jauregui MD, 1 half-tab at 02/09/25 0809    carboxymethylcellulose PF (REFRESH PLUS) 0.5 % ophthalmic solution 1 drop, 1 drop, Both Eyes, Q1H PRN, Veronica Jauregui MD, 1 drop at 01/25/25 1945    Fluticasone-Umeclidin-Vilanterol (TRELEGY ELLIPTA) 200-62.5-25 MCG/ACT oral inhaler 1 puff, 1 puff, Inhalation, Daily, Gonzalez Watt MD, 1 puff at 02/09/25 0810    furosemide (LASIX) tablet 20 mg, 20 mg, Oral, BID, Opal Bowman MD, 20 mg at 02/09/25 0809    gabapentin (NEURONTIN) capsule 400 mg, 400 mg, Oral, TID, David Saba MD, 400 mg at 02/09/25 0808    guaiFENesin (MUCINEX) 12 hr tablet 600 mg, 600 mg, Oral, BID, Tino Wilhelm MD, 600 mg at 02/09/25 0809    ipratropium - albuterol 0.5 mg/2.5 mg/3 mL (DUONEB) neb solution 3 mL, 3 mL, Nebulization, Q4H PRN, Samantha Niño MD, 3 mL at 02/07/25 1856    ipratropium - albuterol 0.5 mg/2.5 mg/3 mL (DUONEB) neb solution 3 mL, 3 mL, Nebulization, 4x daily, Veronica Jauregui MD, 3 mL at 02/09/25 1220    levothyroxine (SYNTHROID/LEVOTHROID) tablet 112 mcg, 112 mcg, Oral, Daily, Veronica Jauregui MD, 112 mcg at 02/09/25 0809    lidocaine (LMX4) cream, , Topical, Q1H PRN, Veronica Jauregui MD    lidocaine 1 % 0.1-1 mL, 0.1-1 mL, Other, Q1H PRN, Veronica Jauregui MD    loperamide (IMODIUM) capsule 2 mg, 2  mg, Oral, 4x Daily PRN, Gonzalez Watt MD    LORazepam (ATIVAN) half-tab 0.25 mg, 0.25 mg, Oral, Q6H PRN, Antoinette Solorio PA-C, 0.25 mg at 02/03/25 1417    melatonin tablet 5 mg, 5 mg, Oral, At Bedtime PRN, David Saba MD    methocarbamol (ROBAXIN) tablet 500 mg, 500 mg, Oral, 4x Daily PRN, Antoinette Solorio PA-C    metoprolol succinate ER (TOPROL XL) 24 hr tablet 50 mg, 50 mg, Oral, Daily, David Saba MD, 50 mg at 02/09/25 0808    [DISCONTINUED] morphine sulfate (ROXANOL) 20 mg/mL (HIGH CONC) soln 5 mg, 5 mg, Oral, Q2H PRN **OR** morphine sulfate (ROXANOL) 20 mg/mL (HIGH CONC) soln 5-10 mg, 5-10 mg, Oral, Q2H PRN, Antoinette Solorio PA-C, 10 mg at 01/28/25 0022    morphine sulfate (ROXANOL) 20 mg/mL (HIGH CONC) soln 7.5 mg, 7.5 mg, Oral, 5x Daily, Tino Wilhelm MD, 7.5 mg at 02/09/25 1111    multivitamin w/minerals (THERA-VIT-M) tablet 1 tablet, 1 tablet, Oral, Daily, Opal Bowman MD, 1 tablet at 02/09/25 0809    naloxone (NARCAN) injection 0.2 mg, 0.2 mg, Intravenous, Q2 Min PRN **OR** naloxone (NARCAN) injection 0.4 mg, 0.4 mg, Intravenous, Q2 Min PRN **OR** naloxone (NARCAN) injection 0.2 mg, 0.2 mg, Intramuscular, Q2 Min PRN **OR** naloxone (NARCAN) injection 0.4 mg, 0.4 mg, Intramuscular, Q2 Min PRN, Veronica Jauregui MD    ondansetron (ZOFRAN ODT) ODT tab 4 mg, 4 mg, Oral, Q6H PRN, David Saba MD    polyethylene glycol (MIRALAX) Packet 17 g, 17 g, Oral, BID PRN, Veronica Jauregui MD    potassium chloride santo ER (KLOR-CON M20) CR tablet 20 mEq, 20 mEq, Oral, BID, Opal Bowman MD, 20 mEq at 02/09/25 0809    predniSONE (DELTASONE) tablet 40 mg, 40 mg, Oral, Daily, 40 mg at 02/09/25 0809 **FOLLOWED BY** [START ON 2/11/2025] predniSONE (DELTASONE) tablet 20 mg, 20 mg, Oral, Daily **FOLLOWED BY** [START ON 2/14/2025] predniSONE (DELTASONE) tablet 10 mg, 10 mg, Oral, Daily **FOLLOWED BY** [START ON 2/17/2025] predniSONE (DELTASONE) tablet 5 mg, 5 mg, Oral, Daily, Marce Anderson  MD BALA    rosuvastatin (CRESTOR) tablet 10 mg, 10 mg, Oral, Daily, Samantha Niño MD, 10 mg at 02/09/25 0808    senna-docusate (SENOKOT-S/PERICOLACE) 8.6-50 MG per tablet 1 tablet, 1 tablet, Oral, BID PRN, 1 tablet at 02/08/25 2208 **OR** senna-docusate (SENOKOT-S/PERICOLACE) 8.6-50 MG per tablet 2 tablet, 2 tablet, Oral, BID PRN, Veronica Jauregui MD, 2 tablet at 01/11/25 1651    sodium chloride (PF) 0.9% PF flush 3 mL, 3 mL, Intracatheter, Q8H, Veronica Jauregui MD, 3 mL at 02/09/25 0811    sodium chloride (PF) 0.9% PF flush 3 mL, 3 mL, Intracatheter, q1 min prn, Veronica Jauregui MD, 3 mL at 02/04/25 0140    Vitamin D3 (CHOLECALCIFEROL) tablet 25 mcg, 25 mcg, Oral, Daily, Opal Bowman MD, 25 mcg at 02/09/25 0809    Physical Exam   Vital Signs: Temp: 98.3  F (36.8  C) Temp src: Oral BP: 135/72 Pulse: 60   Resp: 19 SpO2: 94 % O2 Device: Nasal cannula Oxygen Delivery: 2 LPM  Weight: 155 lbs 14.4 oz  General appearence: awake alert  , some shortness of breath  but in  no apparent distress    RESPIRATORY:on 1 l of oxygen, diminished breath sounds on the bilateral lower lungs      CARDIOVASCULAR:S1 S2 regular rate and rhythm,  GASTROINTESTINAL:soft, non-distended , non-tender , + bowel sounds,    SKIN: warm and dry, no mottling noted   NEUROLOGIC; awake alert and oriented,   EXTREMITIES: no clubbing, cyanosis or edema       Data   CBC RESULTS:   Recent Labs   Lab Test 01/31/25  0808   WBC 5.9   RBC 3.29*   HGB 10.3*   HCT 30.8*   MCV 94   MCH 31.3   MCHC 33.4   RDW 16.5*   PLT 85*     Last Comprehensive Metabolic Panel:  Lab Results   Component Value Date     02/07/2025    POTASSIUM 4.3 02/07/2025    CHLORIDE 101 02/07/2025    CO2 28 02/07/2025    ANIONGAP 10 02/07/2025     (H) 02/07/2025    BUN 19.2 02/07/2025    CR 0.81 02/07/2025    GFRESTIMATED >90 02/07/2025    KARLIE 9.3 02/07/2025     CT Chest w/o Contrast   Final Result   IMPRESSION:   1. Increased left greater than right lower lobe  peribronchovascular   consolidative and groundglass opacities with tree-in-bud nodularity,   which may be secondary to an infectious/inflammatory etiology,   including aspiration pneumonitis versus atypical pneumonia. Recommend   follow-up chest CT in 3 months.   2. Bilateral sub-6 mm solid pulmonary nodules. Recommend attention on   follow-up.   3. Cholelithiasis without evidence of acute cholecystitis.      I have personally reviewed the examination and initial interpretation   and I agree with the findings.      JUAN RUELAS MD            SYSTEM ID:  [id number]      XR Chest 2 Views   Final Result   IMPRESSION: Improved left base atelectasis/consolidation. No new   opacities.      I have personally reviewed the examination and initial interpretation   and I agree with the findings.      SHAN ROSALES MD            SYSTEM ID:  [id number]      XR Chest 2 Views   Final Result   IMPRESSION: No significant changes mammographically from 2 weeks ago.   Atherosclerosis as well.      DAVID RUIZ MD            SYSTEM ID:  [id number]      XR Chest 2 Views   Final Result   IMPRESSION: Unchanged atelectasis, mild edema or other airway   inflammation an osteoporotic mid thoracic compression deformities   unchanged. No acute consolidation or new dominant pleural effusion.      DAVID RUIZ MD            SYSTEM ID:  [id number]      XR Video Swallow with SLP or OT   Final Result   Impression:   1. Two episodes of valorie silent aspiration with thin liquid barium,   otherwise normal thin liquid trials with occasional transient flash   penetration.    2. Please see the speech pathologist report for further details.         TESFAYE MOONEY DO            SYSTEM ID:  [id number]      XR Chest 2 Views   Final Result   Impression:    1. Decrease of right perihilar opacity. Increased left lower lobe   pulmonary opacities.   2. Unchanged linear density of the right upper lobe, likely relating   to fibrosis versus atelectasis.       I have personally reviewed the examination and initial interpretation   and I agree with the findings.      SILVANO GIANG MD            SYSTEM ID:  [id number]      CT Chest Pulmonary Embolism w Contrast   Final Result   IMPRESSION:    1. Exam is negative for acute pulmonary embolism.    2. Waxing and waning pulmonary nodularity with a new 8 mm nodular   focus in the left lower lobe. Findings are likely secondary to a   chronic infectious/inflammatory process including aspiration. Consider   short-term follow-up CT in one month for reassessment.   3. Additional sub-6 mm pulmonary nodules are stable compared to prior.   4. Moderate to severe emphysematous changes of the lung parenchyma   with chronic fibrotic changes along the right minor fissure.         In the event of a positive result for acute pulmonary embolism   resulting in right heart strain, consider calling the    [facility] hospital  for PERT (Pulmonary Embolism Response Team)   Activation?      PERT -- Pulmonary Embolism Response Team (Multidisciplinary team   including cardiology, interventional radiology, critical care,   hematology)      I have personally reviewed the examination and initial interpretation   and I agree with the findings.      TERESITA BUSTAMANTE MD            SYSTEM ID:  [id number]      Chest XR,  PA & LAT   Final Result   IMPRESSION:   1. Unchanged right upper lobe lung fibrosis with resultant volume loss   and hemidiaphragm elevation.   2. No focal airspace opacities.      I have personally reviewed the examination and initial interpretation   and I agree with the findings.      JUAN RUELAS MD            SYSTEM ID:  [id number]

## 2025-02-10 LAB
ANION GAP SERPL CALCULATED.3IONS-SCNC: 9 MMOL/L (ref 7–15)
BUN SERPL-MCNC: 16.8 MG/DL (ref 8–23)
CALCIUM SERPL-MCNC: 8.9 MG/DL (ref 8.8–10.4)
CHLORIDE SERPL-SCNC: 103 MMOL/L (ref 98–107)
CREAT SERPL-MCNC: 0.88 MG/DL (ref 0.67–1.17)
EGFRCR SERPLBLD CKD-EPI 2021: 89 ML/MIN/1.73M2
GLUCOSE SERPL-MCNC: 79 MG/DL (ref 70–99)
HCO3 SERPL-SCNC: 29 MMOL/L (ref 22–29)
POTASSIUM SERPL-SCNC: 4.2 MMOL/L (ref 3.4–5.3)
SODIUM SERPL-SCNC: 141 MMOL/L (ref 135–145)

## 2025-02-10 PROCEDURE — 250N000012 HC RX MED GY IP 250 OP 636 PS 637: Performed by: INTERNAL MEDICINE

## 2025-02-10 PROCEDURE — 250N000013 HC RX MED GY IP 250 OP 250 PS 637: Performed by: STUDENT IN AN ORGANIZED HEALTH CARE EDUCATION/TRAINING PROGRAM

## 2025-02-10 PROCEDURE — 250N000013 HC RX MED GY IP 250 OP 250 PS 637: Performed by: INTERNAL MEDICINE

## 2025-02-10 PROCEDURE — 120N000002 HC R&B MED SURG/OB UMMC

## 2025-02-10 PROCEDURE — 82565 ASSAY OF CREATININE: CPT | Performed by: INTERNAL MEDICINE

## 2025-02-10 PROCEDURE — 250N000013 HC RX MED GY IP 250 OP 250 PS 637

## 2025-02-10 PROCEDURE — 82310 ASSAY OF CALCIUM: CPT | Performed by: INTERNAL MEDICINE

## 2025-02-10 PROCEDURE — 36415 COLL VENOUS BLD VENIPUNCTURE: CPT | Performed by: INTERNAL MEDICINE

## 2025-02-10 PROCEDURE — 94640 AIRWAY INHALATION TREATMENT: CPT

## 2025-02-10 PROCEDURE — 99232 SBSQ HOSP IP/OBS MODERATE 35: CPT | Performed by: INTERNAL MEDICINE

## 2025-02-10 PROCEDURE — 999N000157 HC STATISTIC RCP TIME EA 10 MIN

## 2025-02-10 PROCEDURE — 250N000009 HC RX 250

## 2025-02-10 PROCEDURE — 94640 AIRWAY INHALATION TREATMENT: CPT | Mod: 76

## 2025-02-10 RX ADMIN — CARBIDOPA AND LEVODOPA 1 TABLET: 25; 100 TABLET ORAL at 20:00

## 2025-02-10 RX ADMIN — Medication 1 HALF-TAB: at 15:35

## 2025-02-10 RX ADMIN — PREDNISONE 40 MG: 20 TABLET ORAL at 08:21

## 2025-02-10 RX ADMIN — MORPHINE SULFATE 7.5 MG: 20 SOLUTION ORAL at 22:16

## 2025-02-10 RX ADMIN — IPRATROPIUM BROMIDE AND ALBUTEROL SULFATE 3 ML: .5; 3 SOLUTION RESPIRATORY (INHALATION) at 16:39

## 2025-02-10 RX ADMIN — GUAIFENESIN 600 MG: 600 TABLET ORAL at 20:00

## 2025-02-10 RX ADMIN — MORPHINE SULFATE 7.5 MG: 20 SOLUTION ORAL at 11:05

## 2025-02-10 RX ADMIN — METOPROLOL SUCCINATE 50 MG: 50 TABLET, EXTENDED RELEASE ORAL at 08:21

## 2025-02-10 RX ADMIN — GABAPENTIN 400 MG: 300 CAPSULE ORAL at 20:00

## 2025-02-10 RX ADMIN — Medication 1 HALF-TAB: at 08:21

## 2025-02-10 RX ADMIN — IPRATROPIUM BROMIDE AND ALBUTEROL SULFATE 3 ML: .5; 3 SOLUTION RESPIRATORY (INHALATION) at 20:58

## 2025-02-10 RX ADMIN — GUAIFENESIN 600 MG: 600 TABLET ORAL at 08:22

## 2025-02-10 RX ADMIN — Medication 25 MCG: at 08:21

## 2025-02-10 RX ADMIN — POTASSIUM CHLORIDE 20 MEQ: 1500 TABLET, EXTENDED RELEASE ORAL at 08:22

## 2025-02-10 RX ADMIN — POTASSIUM CHLORIDE 20 MEQ: 1500 TABLET, EXTENDED RELEASE ORAL at 20:00

## 2025-02-10 RX ADMIN — CARBIDOPA AND LEVODOPA 1 TABLET: 25; 100 TABLET ORAL at 15:35

## 2025-02-10 RX ADMIN — MORPHINE SULFATE 7.5 MG: 20 SOLUTION ORAL at 08:22

## 2025-02-10 RX ADMIN — CARBIDOPA AND LEVODOPA 1 TABLET: 25; 100 TABLET ORAL at 08:22

## 2025-02-10 RX ADMIN — ROSUVASTATIN 10 MG: 10 TABLET, FILM COATED ORAL at 08:22

## 2025-02-10 RX ADMIN — FUROSEMIDE 20 MG: 20 TABLET ORAL at 08:22

## 2025-02-10 RX ADMIN — Medication 1 TABLET: at 08:21

## 2025-02-10 RX ADMIN — IPRATROPIUM BROMIDE AND ALBUTEROL SULFATE 3 ML: .5; 3 SOLUTION RESPIRATORY (INHALATION) at 13:51

## 2025-02-10 RX ADMIN — LEVOTHYROXINE SODIUM 112 MCG: 112 TABLET ORAL at 08:21

## 2025-02-10 RX ADMIN — MORPHINE SULFATE 7.5 MG: 20 SOLUTION ORAL at 18:34

## 2025-02-10 RX ADMIN — FUROSEMIDE 20 MG: 20 TABLET ORAL at 15:35

## 2025-02-10 RX ADMIN — GABAPENTIN 400 MG: 300 CAPSULE ORAL at 15:35

## 2025-02-10 RX ADMIN — MORPHINE SULFATE 7.5 MG: 20 SOLUTION ORAL at 15:35

## 2025-02-10 RX ADMIN — IPRATROPIUM BROMIDE AND ALBUTEROL SULFATE 3 ML: .5; 3 SOLUTION RESPIRATORY (INHALATION) at 07:44

## 2025-02-10 RX ADMIN — GABAPENTIN 400 MG: 300 CAPSULE ORAL at 08:21

## 2025-02-10 RX ADMIN — Medication 1 HALF-TAB: at 20:00

## 2025-02-10 RX ADMIN — Medication 600 MG: at 08:21

## 2025-02-10 ASSESSMENT — ACTIVITIES OF DAILY LIVING (ADL)
ADLS_ACUITY_SCORE: 42
ADLS_ACUITY_SCORE: 43
ADLS_ACUITY_SCORE: 42
ADLS_ACUITY_SCORE: 43
ADLS_ACUITY_SCORE: 42

## 2025-02-10 NOTE — PROGRESS NOTES
VS: Temp: 98.6  F (37  C) Temp src: Oral BP: 122/66 Pulse: 73   Resp: 16 SpO2: 95 % O2 Device: Nasal cannula Oxygen Delivery: 1 LPM     O2: Supplemental O2, SOB, exertion with activity, barrel chest utilizes nebulizers and inhalers for SOB   Output: Voids spontaneously in bathroom   Last BM: 2/9   Activity: Up with SBA of 1, 2 wheeled walker, stable gait, generalized weakness. Patient becomes winded with ambulation/activity   Skin: Ziyad appearance, skin tear to right forearm,  covered with mepilex, multiple healed bruises   Pain: Managed by scheduled morphine, gabapentin   CMS: Alert and oriented /4, able to verbalize needs appropriately, calls appropriately   Dressing: Intact   Diet: REG   LDA: PIV right forearm, SL   Equipment: Walker, IV pump/pole   Plan: Pending placement LTC   Additional Info:

## 2025-02-10 NOTE — PROGRESS NOTES
Care Management Follow Up    Length of Stay (days): 37    Expected Discharge Date: 02/11/2025     Concerns to be Addressed: discharge planning     Patient plan of care discussed at interdisciplinary rounds: Yes    Anticipated Discharge Disposition: Long Term Care      Anticipated Discharge Services: Other (see comment) (current on home hospice)  Anticipated Discharge DME: None    Patient/family educated on Medicare website which has current facility and service quality ratings:    Education Provided on the Discharge Plan: Yes  Patient/Family in Agreement with the Plan:  Yes  Referrals Placed by CM/SW:   [facility]  [address]  [place]  [address]  P: [phone]  P: [phone] - Admissions  F: [phone]   2/10: Initial referral sent    [facility]  [address]  [place]  [address]  P: [phone]  F: [phone]   2/10: initial referral  sent    [facility]   [address] [place]  [address]  P: [phone]  P: [phone] - Admissions  F: [phone]   2/10: Initial referral sent    [facility]  Ph: [phone]  Fax :[phone]  2/3/25: referral sent  2/4/25: Can't take  any more MA Pending cases  2/6: SW re-sent referral now that MA  is active.   2/7: YANE reviews  these and went  home  sick today. Will try again Monday if Pt  is still here.   2/10: YANE is reviewing and admissions will follow up with JARAD.     [facility]  [address]  [address]  P: [phone]  P: [phone] - Admissions  F: [phone]   2/4: Referral sent  2/5: No beds available     [facility]  Ph: [phone]-  2/3/25: VM left with Leila in marketing requesting call  back.  2/5: Only take CADI and Elderly waiver and processing time thru the county is longer than prognosis for length of life.      [facility]  Ph: [phone]  Fax: [phone]  2/6: Referral sent and declined  d/t no bed   "availability.     [facility] (P: [phone], F: [phone])   2/6: Referral  sent-declined-based on Pt's needs and the acuity  of  other pt's on the unit they feel  they cannot meet his needs at this time.       [facility]  Ph: [phone]  Fax: [phone] or in-basket  2/6/26: Initial referral sent  2/7: No Bed available.       [facility]  [address] [address]   P: [phone]  P: [phone] - Admissions   P: [phone] - Admissions old #?  F: [phone]   2/6: referral sent  2/7: Private pay only over $500 per day.     [facility]  [address] [place] [address]  P: [phone]  P: [phone] - Admissions  F: [phone]   2/6: referral sent  and being reviewed  for  a semi-private  room  2/7: left VM, messaged in-basket, waiting to hear  back  2/10: Medication/needs cost are to high. SW asked for clarification given that Pt has Medicaid and admissions reiterated that costs are to high.       [facility]  [address] [place] [address]  P: [phone]   P: [phone] - Admission   F: [phone]   2/6: initial referral by phone-they will review in EPIC for  a semi-private room  2/7: will review and call SW back.   2/10: Needs \"to complex for current LTC population/staffing levels.\"   Private pay costs discussed: Not applicable    Discussed  Partnership in Safe Discharge Planning  document with patient/family: Yes:     Handoff Completed: No, handoff not indicated or clinically appropriate    Additional Information:  SW followed up with referrals. [facility]'s needs are to complex  to accept when combined with current patient population. Armand states that costs including Medication are to high and would need to be semi private pay or cost sharing despite Pt having Medicaid. [place] in [place] will call  back after the DON is done " reviewing Pt. SW met with Pt to request additional LTC  preferences and or reach a decision if he felt  safe discharging to his sisters house.  Pt identified that he wasn't sure if it would be a safe plan to discharge to his sisters home. Pt identified that he will speak with his daughter about additional Long Term Care Facilities and his sister to see if they can safely plan for him to discharge  to her home.      Next Steps:   SW/RNCC  will  continue to follow for safe discharge planning and placement.    Antonio Houston, [facility]  [facility] & [place] [facility]   Ph: [phone]

## 2025-02-10 NOTE — PROGRESS NOTES
"[facility]    Medicine Progress Note - Hospitalist Service, GOLD TEAM 18    Date of Admission:  1/4/2025    Assessment & Plan     Patient is a 78 y/o man who has a past medical history significant for thyroid cancer with lung metastases, Parkinson's disease, neuropathy, glaucoma, osteoporosis, chronic pain, severe COPD, hyperlipidemia, obstructive sleep apnea and coronary artery disease. Patient presented on 1/04/2025 with a 7 day history of productive cough and a 1 day history of weakness. CT pulmonary angiogram was negative for pulmonary embolism. Patient as admitted with  COPD exacerbation  and possible  community-acquired pneumonia as well.      Patient reportedly saw Pulmonology in Oct-2024 and COPD was moderately well-controlled at that time. Activity was apparently limited much more by his balance and parkinsonism than by respiratory status. Patient has antibiotics and prednisone available for outpatient COPD flares but apparently has not recently used this.      Patient apparently just entered hospice in the past week prior to hospital admission. Patient stated on admission that he was too weak and he and his daughter called their hospice agency and 911 was called and he was brought into the hospital. When provider asked on day of presentation whether patient wished for hospitalization and treatment of his potential infection, patient stated that he was seeking treatment and reported that his goal was to \"be able to do things for myself\". It was discussed that hospice typically focused on comfort rather than diagnostic testing and aggressive medical treatment. Patient acknowledged this but had seemed unsure of what his true desires for his care are moving forward.      Patient was on ceftriaxone from 1/04 - 1/08.  azithromycin 500 mg from 1/05-1/07-,  zosyn 1/09-1/ 14- now on every other day       Patient's overall prognosis is poor. Patient has been seen by " palliative care      2/10  Patient feels somewhat better after the Prednisone Burst started. He is currently on 1 l of oxygen but asked to be placed on 2 L as he could not speak with full sentence due to dyspnea.  Palliative care had seen the patient and patient did not want to be DNR at this moment and wanted to pursue aggressive restorative therapy.  I did start him on tapering dose of steroid as recommended starting with 40 mg for 3 days and 20 mg for 5 days and 10 mg for 3 days then on prednisone 5 mg daily afterwards  Patient is noted to have low platelet count  No any bleeding diathesis.  Awaiting placement in LTC, discussed with his daughter Janey Thomas over the place and gave her updates  Medically ready for discharge       Pneumonia, CT showed bilateral opacities which is suspicious for atypical bacterial pneumonia.  Patient has been on azithromycin 500 mg daily as suggested by pulmonary medicine.  However patient felt that he might benefit from IV antibiotic therapy.  Discussed with palliative care about the goals of care discussion.  Patient wanted to pursue aggressive restorative therapy.  I elected to give him azithromycin 500 mg IV for a few more days and switch back to the oral  Sepsis   - completed antibiotics  - on admission felt to have sepsis   - Patient initially was treated for a possible community-acquired pneumonia on admission with ceftriaxone and azithromycin.  - Patient subsequently was treated for possible hospital-acquired pneumonia with zosyn.     COPD exacerbation, O2 and steroid dependent   Emphysema   Chronic hypoxemic respiratory failure  - shortness of breath  with minimal exertion  - sees pulmonary as out patient , has not been interested in pulmonary rehab   - recent prolonged hospitalization with COPD exacerbation    -PFTs of 3/24/2023 showing FEV1/FVC of 1.3/2.4 (46/65% predicted, respectively) for ratio 54%. TLC is reduced at 5.1 (74% predicted) and DLCO uncorrected is  "very low at 10.1 (41% predicted).   - was on prednisone 40 mg daily from 1/04/2025 to 1/08/2025. Patient received prednisone 20 mg once on 1/09- then  methylprednisolone 40 mg IV every 8 hours from 1/09- 1/13 and is no longer on steroids  - 1/28  started prednisone 40 mg x 5 days then 2 mg daily as he had been on this  those for a while prior and suspect he is steroid dependent , state she was taking the prednisone 2 mg pre admission   - uses Trelegy and getting it here   - on O2 1-2 litres at baseline.  - CT pulmonary angio 1/4 had no pulmonary embolism  . \"Waxing and waning pulmonary nodularity with a new 8 mm nodular focus in the left lower lobe. Findings are likely secondary to a chronic infectious/inflammatory process including aspiration. Consider short-term follow-up CT in one month for reassessment. 3. Additional sub-6 mm pulmonary nodules are stable compared to prior. 4. Moderate to severe emphysematous changes of the lung parenchyma with chronic fibrotic changes along the right minor fissure.\"  - Patient patient's daughter, patient may be placed on BiPAP if needed and per daughter he had been on BIPAP intermittently that helped with his breathing   - suspect has element of pulmonary hypertension , on lasix, did increase to bid 1/30       Coronary artery disease with past MI s/p stent Cx  1997  placement  HDL   - at time of initiall stent placement he was noted to have 80-90% lesion in nondominant RCA  and 50 % LAD that was treated medically   - Patient usually on aspirin and crestor , restarted aspirin   - Patient continuing metoprolol succinate 50 mg daily.       Hypertension  - continue PTA metoprolol succinate.  - continue lasix , increased to bid as above 1/30 , also increased Kcl     History of traumatic  subdural hemorrhage in 2023  - since resolved per NS follow up      Parkinson's disease  - recently diagnosed   -Has seen neurology and on sinemet and continue     Frequent falls  - with left femur " fracture and inf pubic rami fracture, also history rib fracture        Metastatic thyroid cancer (multifocal papillary carcinoma)   Status post  thyroidectomy 2021   also received radioactive iodine   - had lymph node metastases,.  - Further surveillance as outpatient.  - continue levothyroxine         Right upper lobe lung mass,  s/p radiation therapy with possible radiation pneumonitis:  -  had previous VATS biopsy in Feb-2022 that was non-diagnostic.     New lung nodule in left lower lobe  - follow up  pulmonary         Thrombocytopenia  -  last plt count was 72K 1/14, then went to  54K ---59K ---85K, was low on admission  - he denies alcohol consumption  - cont to monitor   - restarted ASA    Platelet count dropped to 14,000 on 12/16.  Aspirin 81 mg held due to severe thrombocytopenia.  There is no any bleeding diathesis at this moment.       Rheumatoid arthritis with positive rheumatoid factor:  - Per chart review, patient had once been on methotrexate and prednisone but had stopped these medications a few years ago.  - Patient to f/u as outpatient if desired.     Osteopenia   -had been on calcium and vit D       History of immunodeficency  - IG subclass 2      Hypernatremia  - improved:      Neuropathy   chronic pain syndrome:  - Patient currently on gabapentin 400 mg three times a day.  --his  PTA robaxin 500mg TID has been on hold   -Hold PTA trazadone 50mg tab QHS  -Continue PTA oxycodone 5mg Q6H for pain      Glaucoma:  Dry eyes    - Patient not currently on medication for this.  - Patient to f/u with Ophthalmology as outpatient if further treatment is desired.       osteoporosis  h/o right femoral neck fracture s/p right total hip replacement   -hold PTA denosumab 60mg injection every 6 months (last 10/25/2024)  -restarted  PTA calcium , vitamin D  - prolia q 6 mo          Moderate malnutrition in the context of acute vs. chronic illness:  - Supplementing as able.   Cognitive impairment.  Palliative care  is questioning patient's decision-making capacity and recommending OT consult for SLUMS/MOCA     Diet: Regular Diet Adult Thin Liquids (level 0)  Snacks/Supplements Adult: Ensure Enlive; With Meals    DVT Prophylaxis: consider subcutaneous heparin   Mir Catheter: Not present  Lines: None     Cardiac Monitoring: None  Code Status: No CPR- Do NOT Intubate      Clinically Significant Risk Factors                   # Hypertension: Noted on problem list     # Acute Hypoxic Respiratory Failure: Documented O2 saturation < 90%. Continue supplemental oxygen as needed          # Moderate Malnutrition: based on nutrition assessment    # Financial/Environmental Concerns:           Social Drivers of Health    Tobacco Use: Medium Risk (10/18/2024)    Patient History     Smoking Tobacco Use: Former     Smokeless Tobacco Use: Never   Physical Activity: Inactive (10/14/2024)    Exercise Vital Sign     Days of Exercise per Week: 0 days     Minutes of Exercise per Session: 0 min   Stress: Stress Concern Present (10/14/2024)    [employer] [place] [employer] - Occupational Stress Questionnaire     Feeling of Stress : Very much   Social Connections: Unknown (10/14/2024)    Social Connection and Isolation Panel [NHANES]     Frequency of Social Gatherings with Friends and Family: More than three times a week          Disposition Plan   Waiting for LTC placement              Marce Anderson MD  Hospitalist Service, [facility] 18  [facility]  Securely message with [employer] (more info)  Text page via [employer] Paging/Directory   See signed in provider for up to date coverage information  ______________________________________________________________________    Interval History   Pt seen and examined   He  cannot really tell if breathing is better, he seems to be speaking a bit better but still seems to have significant shortness of breath  , when not talking breathing is calm   Remains  afebrile.  No any vomiting or diarrhea    Current Facility-Administered Medications:     acetaminophen (TYLENOL) tablet 650 mg, 650 mg, Oral, Q4H PRN, David Saba MD    amLODIPine (NORVASC) tablet 5 mg, 5 mg, Oral, At Bedtime, Samantha Niño MD, 5 mg at 02/09/25 2134    [Held by provider] aspirin EC tablet 81 mg, 81 mg, Oral, BID, Opal Bowman MD, 81 mg at 02/07/25 0813    azithromycin (ZITHROMAX) tablet 500 mg, 500 mg, Oral, Every Other Day, Marce Anderson MD, 500 mg at 02/09/25 0808    calcium carbonate (OS-KARLIE) tablet 600 mg, 600 mg, Oral, Daily, Opal Bowman MD, 600 mg at 02/10/25 0821    carbidopa-levodopa (SINEMET)  MG per tablet 1 tablet, 1 tablet, Oral, TID, 1 tablet at 02/10/25 0822 **AND** carbidopa-levodopa half-tab 12.5-50 mg, 1 half-tab, Oral, TID, Veronica Jauregui MD, 1 half-tab at 02/10/25 0821    carboxymethylcellulose PF (REFRESH PLUS) 0.5 % ophthalmic solution 1 drop, 1 drop, Both Eyes, Q1H PRN, Veronica Jauregui MD, 1 drop at 01/25/25 1945    Fluticasone-Umeclidin-Vilanterol (TRELEGY ELLIPTA) 200-62.5-25 MCG/ACT oral inhaler 1 puff, 1 puff, Inhalation, Daily, Gonzalez Watt MD, 1 puff at 02/10/25 0834    furosemide (LASIX) tablet 20 mg, 20 mg, Oral, BID, Opal Bowamn MD, 20 mg at 02/10/25 0822    gabapentin (NEURONTIN) capsule 400 mg, 400 mg, Oral, TID, David Saba MD, 400 mg at 02/10/25 0821    guaiFENesin (MUCINEX) 12 hr tablet 600 mg, 600 mg, Oral, BID, Tino Wilhelm MD, 600 mg at 02/10/25 0822    ipratropium - albuterol 0.5 mg/2.5 mg/3 mL (DUONEB) neb solution 3 mL, 3 mL, Nebulization, Q4H PRN, Samantha Niño MD, 3 mL at 02/07/25 1856    ipratropium - albuterol 0.5 mg/2.5 mg/3 mL (DUONEB) neb solution 3 mL, 3 mL, Nebulization, 4x daily, Veronica Jauregui MD, 3 mL at 02/10/25 0744    levothyroxine (SYNTHROID/LEVOTHROID) tablet 112 mcg, 112 mcg, Oral, Daily, Veronica Jauregui MD, 112 mcg at 02/10/25 0821    lidocaine (LMX4) cream, , Topical, Q1H  PRN, Veronica Jauregui MD    lidocaine 1 % 0.1-1 mL, 0.1-1 mL, Other, Q1H PRN, Veronica Jauregui MD    loperamide (IMODIUM) capsule 2 mg, 2 mg, Oral, 4x Daily PRN, Gonzalez Watt MD    LORazepam (ATIVAN) half-tab 0.25 mg, 0.25 mg, Oral, Q6H PRN, Antoinette Solorio PA-C, 0.25 mg at 02/03/25 1417    melatonin tablet 5 mg, 5 mg, Oral, At Bedtime PRN, David Saba MD    methocarbamol (ROBAXIN) tablet 500 mg, 500 mg, Oral, 4x Daily PRN, Antoinette Solorio PA-C    metoprolol succinate ER (TOPROL XL) 24 hr tablet 50 mg, 50 mg, Oral, Daily, David Saba MD, 50 mg at 02/10/25 0821    [DISCONTINUED] morphine sulfate (ROXANOL) 20 mg/mL (HIGH CONC) soln 5 mg, 5 mg, Oral, Q2H PRN **OR** morphine sulfate (ROXANOL) 20 mg/mL (HIGH CONC) soln 5-10 mg, 5-10 mg, Oral, Q2H PRN, Antoinette Solorio PA-C, 10 mg at 01/28/25 0022    morphine sulfate (ROXANOL) 20 mg/mL (HIGH CONC) soln 7.5 mg, 7.5 mg, Oral, 5x Daily, Tino Wilhelm MD, 7.5 mg at 02/10/25 1105    multivitamin w/minerals (THERA-VIT-M) tablet 1 tablet, 1 tablet, Oral, Daily, Opal Bowman MD, 1 tablet at 02/10/25 0821    naloxone (NARCAN) injection 0.2 mg, 0.2 mg, Intravenous, Q2 Min PRN **OR** naloxone (NARCAN) injection 0.4 mg, 0.4 mg, Intravenous, Q2 Min PRN **OR** naloxone (NARCAN) injection 0.2 mg, 0.2 mg, Intramuscular, Q2 Min PRN **OR** naloxone (NARCAN) injection 0.4 mg, 0.4 mg, Intramuscular, Q2 Min PRN, Veronica Jauregui MD    ondansetron (ZOFRAN ODT) ODT tab 4 mg, 4 mg, Oral, Q6H PRN, David Saba MD    polyethylene glycol (MIRALAX) Packet 17 g, 17 g, Oral, BID PRN, Veronica Jauregui MD    potassium chloride santo ER (KLOR-CON M20) CR tablet 20 mEq, 20 mEq, Oral, BID, Opal Bowman MD, 20 mEq at 02/10/25 0822    [COMPLETED] predniSONE (DELTASONE) tablet 40 mg, 40 mg, Oral, Daily, 40 mg at 02/10/25 0821 **FOLLOWED BY** [START ON 2/11/2025] predniSONE (DELTASONE) tablet 20 mg, 20 mg, Oral, Daily **FOLLOWED BY** [START ON 2/14/2025]  predniSONE (DELTASONE) tablet 10 mg, 10 mg, Oral, Daily **FOLLOWED BY** [START ON 2/17/2025] predniSONE (DELTASONE) tablet 5 mg, 5 mg, Oral, Daily, Marce Anderson MD    rosuvastatin (CRESTOR) tablet 10 mg, 10 mg, Oral, Daily, Samantha Niño MD, 10 mg at 02/10/25 0822    senna-docusate (SENOKOT-S/PERICOLACE) 8.6-50 MG per tablet 1 tablet, 1 tablet, Oral, BID PRN, 1 tablet at 02/08/25 2208 **OR** senna-docusate (SENOKOT-S/PERICOLACE) 8.6-50 MG per tablet 2 tablet, 2 tablet, Oral, BID PRN, Veronica Jauregui MD, 2 tablet at 01/11/25 1651    sodium chloride (PF) 0.9% PF flush 3 mL, 3 mL, Intracatheter, Q8H, Veronica Jauregui MD, 3 mL at 02/10/25 0830    sodium chloride (PF) 0.9% PF flush 3 mL, 3 mL, Intracatheter, q1 min prn, Veronica Jauregui MD, 3 mL at 02/04/25 0140    Vitamin D3 (CHOLECALCIFEROL) tablet 25 mcg, 25 mcg, Oral, Daily, Opal Bowman MD, 25 mcg at 02/10/25 0821    Physical Exam   Vital Signs: Temp: 97.6  F (36.4  C) Temp src: Oral BP: 123/73 Pulse: 61   Resp: 16 SpO2: 98 % O2 Device: Nasal cannula Oxygen Delivery: 1 LPM  Weight: 156 lbs 14.4 oz  General appearence: awake alert  , some shortness of breath  but in  no apparent distress    RESPIRATORY:on 1 l of oxygen, diminished breath sounds on the bilateral lower lungs      CARDIOVASCULAR:S1 S2 regular rate and rhythm,  GASTROINTESTINAL:soft, non-distended , non-tender , + bowel sounds,    SKIN: warm and dry, no mottling noted   NEUROLOGIC; awake alert and oriented,   EXTREMITIES: no clubbing, cyanosis or edema       Data   CBC RESULTS:   Recent Labs   Lab Test 01/31/25  0808   WBC 5.9   RBC 3.29*   HGB 10.3*   HCT 30.8*   MCV 94   MCH 31.3   MCHC 33.4   RDW 16.5*   PLT 85*     Last Comprehensive Metabolic Panel:  Lab Results   Component Value Date     02/10/2025    POTASSIUM 4.2 02/10/2025    CHLORIDE 103 02/10/2025    CO2 29 02/10/2025    ANIONGAP 9 02/10/2025    GLC 79 02/10/2025    BUN 16.8 02/10/2025    CR 0.88 02/10/2025     GFRESTIMATED 89 02/10/2025    KARLIE 8.9 02/10/2025     CT Chest w/o Contrast   Final Result   IMPRESSION:   1. Increased left greater than right lower lobe peribronchovascular   consolidative and groundglass opacities with tree-in-bud nodularity,   which may be secondary to an infectious/inflammatory etiology,   including aspiration pneumonitis versus atypical pneumonia. Recommend   follow-up chest CT in 3 months.   2. Bilateral sub-6 mm solid pulmonary nodules. Recommend attention on   follow-up.   3. Cholelithiasis without evidence of acute cholecystitis.      I have personally reviewed the examination and initial interpretation   and I agree with the findings.      JUAN RUELAS MD            SYSTEM ID:  [id number]      XR Chest 2 Views   Final Result   IMPRESSION: Improved left base atelectasis/consolidation. No new   opacities.      I have personally reviewed the examination and initial interpretation   and I agree with the findings.      SHAN ROSALES MD            SYSTEM ID:  [id number]      XR Chest 2 Views   Final Result   IMPRESSION: No significant changes mammographically from 2 weeks ago.   Atherosclerosis as well.      DAVID RUIZ MD            SYSTEM ID:  [id number]      XR Chest 2 Views   Final Result   IMPRESSION: Unchanged atelectasis, mild edema or other airway   inflammation an osteoporotic mid thoracic compression deformities   unchanged. No acute consolidation or new dominant pleural effusion.      DAVID RUIZ MD            SYSTEM ID:  [id number]      XR Video Swallow with SLP or OT   Final Result   Impression:   1. Two episodes of valorie silent aspiration with thin liquid barium,   otherwise normal thin liquid trials with occasional transient flash   penetration.    2. Please see the speech pathologist report for further details.         TESFAYE MOONEY DO            SYSTEM ID:  [id number]      XR Chest 2 Views   Final Result   Impression:    1. Decrease of right perihilar opacity.  Increased left lower lobe   pulmonary opacities.   2. Unchanged linear density of the right upper lobe, likely relating   to fibrosis versus atelectasis.      I have personally reviewed the examination and initial interpretation   and I agree with the findings.      SILVANO GIANG MD            SYSTEM ID:  [id number]      CT Chest Pulmonary Embolism w Contrast   Final Result   IMPRESSION:    1. Exam is negative for acute pulmonary embolism.    2. Waxing and waning pulmonary nodularity with a new 8 mm nodular   focus in the left lower lobe. Findings are likely secondary to a   chronic infectious/inflammatory process including aspiration. Consider   short-term follow-up CT in one month for reassessment.   3. Additional sub-6 mm pulmonary nodules are stable compared to prior.   4. Moderate to severe emphysematous changes of the lung parenchyma   with chronic fibrotic changes along the right minor fissure.         In the event of a positive result for acute pulmonary embolism   resulting in right heart strain, consider calling the    [facility] hospital  for PERT (Pulmonary Embolism Response Team)   Activation?      PERT -- Pulmonary Embolism Response Team (Multidisciplinary team   including cardiology, interventional radiology, critical care,   hematology)      I have personally reviewed the examination and initial interpretation   and I agree with the findings.      TERESITA BUSTAMANTE MD            SYSTEM ID:  [id number]      Chest XR,  PA & LAT   Final Result   IMPRESSION:   1. Unchanged right upper lobe lung fibrosis with resultant volume loss   and hemidiaphragm elevation.   2. No focal airspace opacities.      I have personally reviewed the examination and initial interpretation   and I agree with the findings.      JUAN RUELAS MD            SYSTEM ID:  [id number]

## 2025-02-11 ENCOUNTER — APPOINTMENT (OUTPATIENT)
Dept: GENERAL RADIOLOGY | Facility: CLINIC | Age: 80
DRG: 871 | End: 2025-02-11
Attending: INTERNAL MEDICINE
Payer: MEDICARE

## 2025-02-11 LAB
ERYTHROCYTE [DISTWIDTH] IN BLOOD BY AUTOMATED COUNT: 19.2 % (ref 10–15)
HCT VFR BLD AUTO: 37.8 % (ref 40–53)
HGB BLD-MCNC: 12.1 G/DL (ref 13.3–17.7)
MCH RBC QN AUTO: 30.9 PG (ref 26.5–33)
MCHC RBC AUTO-ENTMCNC: 32 G/DL (ref 31.5–36.5)
MCV RBC AUTO: 96 FL (ref 78–100)
PLATELET # BLD AUTO: 111 10E3/UL (ref 150–450)
RBC # BLD AUTO: 3.92 10E6/UL (ref 4.4–5.9)
WBC # BLD AUTO: 12.1 10E3/UL (ref 4–11)

## 2025-02-11 PROCEDURE — 250N000013 HC RX MED GY IP 250 OP 250 PS 637: Performed by: STUDENT IN AN ORGANIZED HEALTH CARE EDUCATION/TRAINING PROGRAM

## 2025-02-11 PROCEDURE — 250N000009 HC RX 250

## 2025-02-11 PROCEDURE — 250N000013 HC RX MED GY IP 250 OP 250 PS 637: Performed by: PHYSICIAN ASSISTANT

## 2025-02-11 PROCEDURE — 120N000002 HC R&B MED SURG/OB UMMC

## 2025-02-11 PROCEDURE — 94640 AIRWAY INHALATION TREATMENT: CPT | Mod: 76

## 2025-02-11 PROCEDURE — 36415 COLL VENOUS BLD VENIPUNCTURE: CPT | Performed by: INTERNAL MEDICINE

## 2025-02-11 PROCEDURE — 250N000013 HC RX MED GY IP 250 OP 250 PS 637: Performed by: INTERNAL MEDICINE

## 2025-02-11 PROCEDURE — 71046 X-RAY EXAM CHEST 2 VIEWS: CPT

## 2025-02-11 PROCEDURE — 999N000157 HC STATISTIC RCP TIME EA 10 MIN

## 2025-02-11 PROCEDURE — 85018 HEMOGLOBIN: CPT | Performed by: INTERNAL MEDICINE

## 2025-02-11 PROCEDURE — 250N000013 HC RX MED GY IP 250 OP 250 PS 637

## 2025-02-11 PROCEDURE — 250N000012 HC RX MED GY IP 250 OP 636 PS 637: Performed by: INTERNAL MEDICINE

## 2025-02-11 PROCEDURE — 99232 SBSQ HOSP IP/OBS MODERATE 35: CPT | Performed by: INTERNAL MEDICINE

## 2025-02-11 PROCEDURE — 71046 X-RAY EXAM CHEST 2 VIEWS: CPT | Mod: 26 | Performed by: RADIOLOGY

## 2025-02-11 RX ADMIN — MORPHINE SULFATE 7.5 MG: 20 SOLUTION ORAL at 22:01

## 2025-02-11 RX ADMIN — GABAPENTIN 400 MG: 300 CAPSULE ORAL at 20:07

## 2025-02-11 RX ADMIN — GUAIFENESIN 600 MG: 600 TABLET ORAL at 20:07

## 2025-02-11 RX ADMIN — Medication 1 HALF-TAB: at 14:07

## 2025-02-11 RX ADMIN — MORPHINE SULFATE 7.5 MG: 20 SOLUTION ORAL at 12:14

## 2025-02-11 RX ADMIN — GABAPENTIN 400 MG: 300 CAPSULE ORAL at 08:24

## 2025-02-11 RX ADMIN — IPRATROPIUM BROMIDE AND ALBUTEROL SULFATE 3 ML: .5; 3 SOLUTION RESPIRATORY (INHALATION) at 07:32

## 2025-02-11 RX ADMIN — PREDNISONE 20 MG: 20 TABLET ORAL at 08:23

## 2025-02-11 RX ADMIN — CARBIDOPA AND LEVODOPA 1 TABLET: 25; 100 TABLET ORAL at 08:25

## 2025-02-11 RX ADMIN — Medication 1 HALF-TAB: at 08:25

## 2025-02-11 RX ADMIN — ROSUVASTATIN 10 MG: 10 TABLET, FILM COATED ORAL at 08:24

## 2025-02-11 RX ADMIN — Medication 600 MG: at 08:24

## 2025-02-11 RX ADMIN — CARBIDOPA AND LEVODOPA 1 TABLET: 25; 100 TABLET ORAL at 14:08

## 2025-02-11 RX ADMIN — MORPHINE SULFATE 7.5 MG: 20 SOLUTION ORAL at 18:50

## 2025-02-11 RX ADMIN — POTASSIUM CHLORIDE 20 MEQ: 1500 TABLET, EXTENDED RELEASE ORAL at 20:08

## 2025-02-11 RX ADMIN — Medication 25 MCG: at 08:23

## 2025-02-11 RX ADMIN — IPRATROPIUM BROMIDE AND ALBUTEROL SULFATE 3 ML: .5; 3 SOLUTION RESPIRATORY (INHALATION) at 11:48

## 2025-02-11 RX ADMIN — MORPHINE SULFATE 7.5 MG: 20 SOLUTION ORAL at 15:29

## 2025-02-11 RX ADMIN — Medication 0.25 MG: at 14:07

## 2025-02-11 RX ADMIN — IPRATROPIUM BROMIDE AND ALBUTEROL SULFATE 3 ML: .5; 3 SOLUTION RESPIRATORY (INHALATION) at 21:51

## 2025-02-11 RX ADMIN — GUAIFENESIN 600 MG: 600 TABLET ORAL at 08:24

## 2025-02-11 RX ADMIN — AMLODIPINE BESYLATE 5 MG: 5 TABLET ORAL at 22:00

## 2025-02-11 RX ADMIN — POTASSIUM CHLORIDE 20 MEQ: 1500 TABLET, EXTENDED RELEASE ORAL at 08:24

## 2025-02-11 RX ADMIN — Medication 1 HALF-TAB: at 20:07

## 2025-02-11 RX ADMIN — FUROSEMIDE 20 MG: 20 TABLET ORAL at 08:40

## 2025-02-11 RX ADMIN — MORPHINE SULFATE 7.5 MG: 20 SOLUTION ORAL at 08:23

## 2025-02-11 RX ADMIN — CARBIDOPA AND LEVODOPA 1 TABLET: 25; 100 TABLET ORAL at 20:09

## 2025-02-11 RX ADMIN — IPRATROPIUM BROMIDE AND ALBUTEROL SULFATE 3 ML: .5; 3 SOLUTION RESPIRATORY (INHALATION) at 15:53

## 2025-02-11 RX ADMIN — Medication 0.25 MG: at 20:08

## 2025-02-11 RX ADMIN — AZITHROMYCIN DIHYDRATE 500 MG: 250 TABLET ORAL at 08:24

## 2025-02-11 RX ADMIN — LEVOTHYROXINE SODIUM 112 MCG: 112 TABLET ORAL at 08:24

## 2025-02-11 RX ADMIN — GABAPENTIN 400 MG: 300 CAPSULE ORAL at 14:07

## 2025-02-11 RX ADMIN — METOPROLOL SUCCINATE 50 MG: 50 TABLET, EXTENDED RELEASE ORAL at 08:25

## 2025-02-11 RX ADMIN — FUROSEMIDE 20 MG: 20 TABLET ORAL at 15:29

## 2025-02-11 RX ADMIN — Medication 1 TABLET: at 08:24

## 2025-02-11 ASSESSMENT — ACTIVITIES OF DAILY LIVING (ADL)
ADLS_ACUITY_SCORE: 43

## 2025-02-11 NOTE — PLAN OF CARE
Goal Outcome Evaluation:      Plan of Care Reviewed With: patient    Overall Patient Progress: no changeOverall Patient Progress: no change    Pt is A&O X4. VSS. on 2L via NC and maintained sats betweeen 88-92%. Severe SOB with exertion. Up with A X1 with walker. Refused gait belt. LS diminished at posterior base and expiratory wheezing other areas. PIV SL into R forearm. Pt.c/o lower back pain, managed with scheduled  PO morphine. Plan: possible discharge to LTC 2/11. Will continue to monitor.

## 2025-02-11 NOTE — PROGRESS NOTES
Care Management Follow Up    Length of Stay (days): 38    Expected Discharge Date: 02/14/25     Concerns to be Addressed: discharge planning     Patient plan of care discussed at interdisciplinary rounds: Yes    Anticipated Discharge Disposition: Long Term Care              Anticipated Discharge Services: Other (see comment) (current on home hospice)  Anticipated Discharge DME: None    Patient/family educated on Medicare website which has current facility and service quality ratings:    Education Provided on the Discharge Plan: Yes  Patient/Family in Agreement with the Plan:      Referrals Placed by CM/SW:   [facility]  [address] [place]  [address]  P: [phone]  P: [phone] - Admissions  F: [phone]   2/10: Initial referral sent  2/11: They have a private  room/shared bathroom available. Pt's daughter is going to Tour the Facility in the morning 2/12 around 10AM and then come to the hospital to discuss with Pt and SW.      [facility]  [address] [place]  [address]  P: [phone]  F: [phone]   2/10: initial referral  sent     [facility]   [address]  [place]  [address]  P: [phone]  P: [phone] - Admissions  F: [phone]   2/10: Initial referral sent     [facility]  Ph: [phone]  Fax :[phone]  2/3/25: referral sent  2/4/25: Can't take  any more MA Pending cases  2/6: SW re-sent referral now that MA  is active.   2/7: YANE reviews  these and went  home  sick today. Will try again Monday if Pt  is still here.   2/10: YANE is reviewing and admissions will follow up with SW.     [facility]  [address]  [address]  P: [phone]  P: [phone] - Admissions  F: [phone]   2/4: Referral sent  2/5: No beds available     [facility]  Ph: [phone]-  2/3/25: VM left with Leila in marketing requesting call  back.  2/5: Only take CADI and Elderly waiver  "and processing time thru the county is longer than prognosis for length of life.      [facility]  Ph: [phone]  Fax: [phone]  2/6: Referral sent and declined  d/t no bed  availability.     [facility] (P: [phone], F: [phone])   2/6: Referral  sent-declined-based on Pt's needs and the acuity  of  other pt's on the unit they feel  they cannot meet his needs at this time.       [facility] [place]  Ph: [phone]  Fax: [phone] or in-basket  2/6/26: Initial referral sent  2/7: No Bed available.       [address] [address]   P: [phone]  P: [phone] - Admissions   P: [phone] - Admissions old #?  F: [phone]   2/6: referral sent  2/7: Private pay only over $500 per day.      [facility]  [address] [place] [address]  P: [phone]  P: [phone] - Admissions  F: [phone]   2/6: referral sent  and being reviewed  for  a semi-private  room  2/7: left VM, messaged in-basket, waiting to hear  back  2/10: Medication/needs cost are to high. SW asked for clarification given that Pt has Medicaid and admissions reiterated that costs are to high.       [facility] [address] [place] [address]  P: [phone]   P: [phone] - Admission   F: [phone]   2/6: initial referral by phone-they will review in EPIC for  a semi-private room  2/7: will review and call SW back.   2/10: Needs \"to complex for current LTC population/staffing levels.\"       Private pay costs discussed: Not applicable    Discussed  Partnership in Safe Discharge Planning  document with patient/family: Yes:  Handoff Completed: No, handoff not indicated or clinically appropriate    Additional Information:  SW followed up with referrals and sent new  referrals. SW discussed referrals with Pt's daughter and identified to Pt  that there is a facility that has " offered a room with no additional fee and that Pt's  daughter will plan to tour. SW will follow  up with Pt's daughter in the morning .    Next Steps:   SW/RNCC will continue to follow for safe discharge placement and planning.     Antonio Houston, [facility]  [address] [facility]   Ph: [phone]

## 2025-02-11 NOTE — PLAN OF CARE
Goal Outcome Evaluation:      Plan of Care Reviewed With: patient    Overall Patient Progress: no changeOverall Patient Progress: no change         VS: BP (!) 144/72 (BP Location: Left arm)   Pulse 63   Temp 97.2  F (36.2  C) (Oral)   Resp 17   Wt 71.2 kg (156 lb 14.4 oz)   SpO2 98%   BMI 23.51 kg/m          O2: 1 L NC Supplemental O2, SOB, exertion with activity, barrel chest utilizes nebulizers and inhalers for SOB   Output: Voids spontaneously in bathroom   Last BM: 2/10   Activity: Up with SBA of 1, 2 wheeled walker, stable gait, generalized weakness.   Skin: Ziyad appearance, skin tear to right forearm,  covered with mepilex, multiple healed bruises   Pain: Managed by scheduled morphine, gabapentin   CMS: Alert and oriented /4, able to verbalize needs appropriately, calls appropriately   Dressing: Intact   Diet: REG-thin-whole   LDA: PIV right forearm, SL   Equipment: Walker, IV pump/pole   Plan: Pending placement LTC   Additional Info:  Bed bath done this shift

## 2025-02-11 NOTE — PROGRESS NOTES
"[facility]    Medicine Progress Note - Hospitalist Service, GOLD TEAM 18    Date of Admission:  1/4/2025    Assessment & Plan   A: Patient is a 78 y/o man who has a past medical history significant for thyroid cancer with lung metastases, Parkinson's disease, neuropathy, glaucoma, osteoporosis, chronic pain, severe COPD, hyperlipidemia, obstructive sleep apnea and coronary artery disease. Patient presented on 1/04/2025 with a 7 day history of productive cough and a 1 day history of weakness. CT pulmonary angiogram was negative for pulmonary embolism. Patient as admitted with  COPD exacerbation  and possible  community-acquired pneumonia as well.      Patient reportedly saw Pulmonology in Oct-2024 and COPD was moderately well-controlled at that time. Activity was apparently limited much more by his balance and parkinsonism than by respiratory status. Patient has antibiotics and prednisone available for outpatient COPD flares but apparently has not recently used this.      Patient apparently just entered hospice in the past week prior to hospital admission. Patient stated on admission that he was too weak and he and his daughter called their hospice agency and 911 was called and he was brought into the hospital. When provider asked on day of presentation whether patient wished for hospitalization and treatment of his potential infection, patient stated that he was seeking treatment and reported that his goal was to \"be able to do things for myself\". It was discussed that hospice typically focused on comfort rather than diagnostic testing and aggressive medical treatment. Patient acknowledged this but had seemed unsure of what his true desires for his care are moving forward.     Patient was on ceftriaxone from 04-Jan-2025 to 08-Jan-2025. Patient was azithromycin 500 mg daily from 05-Jan-2025 to 07-Jan-2025. Patient was on zosyn from 09-Jan-2025 to 14-Jan-2025. Patient is " now on azithromycin every other day. Patient did receive azithromycin 500 mg IV daily on 06-Feb-2025 and 07-Feb-2025.     Patient is currently on a prednisone taper.     Patient's overall prognosis is poor. Patient has been  seen by Palliative Care. Patient is DNR/DNI.    P:  1.) Pneumonia:  - Patient had received 3 consecutive days of azithromycin 500 mg daily from 06-Feb-2025 to 08-Feb-2025 for coverage of possible atypical bacterial pneumonia.   - Patient had been treated for community-acquired pneumonia on admission.   - Patient subsequently had been treated for possible hospital-acquired pneumonia with zosyn.  - Monitoring for recurrence.    2.) Sepsis:  - Patient was septic on admission secondary to pneumonia. Sepsis has resolved.  - Monitoring for recurrence.    3.) COPD exacerbation; patient oxygen and steroid dependent at baseline:  - Patient now on prednisone taper. Patient to be tapered down to home dose of prednisone 2 mg daily.    4.) Chronic hypoxemic respiratory failure:  - Patient on 1-2 litres per minute of supplemental oxygen at baseline.  - Per patient's daughter, patient may be placed on BiPAP if needed. Patient reportedly had been on BiPAP intermittently that had helped with his breathing.     5.) Concern for pulmonary hypertension:  - Further evaluation as outpatient.    6.) Coronary artery disease with past MI with past stent placement, reportedly around 1997:  - Patient asymptomatic.   - Patient on metoprolol succinate and crestor.    7.) Hypertension:  - Monitoring on metoprolol succinate and lasix.    8.) History of traumatic subdural hemorrhage in 2023:  - This has resolved.  - No active intervention indicated.    9.) Parkinson's disease, recent diagnosis:  - Patient on sinemet.  - Patient to f/u with Neurology as outpatient.    10.) Frequent falls:  - Patient has history of left femur fracture, inferior pubic rami fracture, rib fracture.  - Monitoring for safety.    11.) Metastatic  thyroid cancer (multifocal papillary carcinoma):  - Patient is s/p thyroidectomy in 2021. Patient had received radioactive iodine in the past as well.  - Further surveillance as outpatient.  - Patient on levothyroxine 112 mcg daily.     12.) Right upper lobe lung mass; patient s/p radiation therapy with possible radiation pneumonitis:  - Patient had VATS biopsy in Feb-2022 that was non-diagnostic.  - To f/u as outpatient for further evaluation.    13.) New left lower lobe lung nodule:  - F/u imaging as outpatient.    14.) Thrombocytopenia:  - Platelet count was 14 on 06-Feb and was 111 today.  - Monitoring labs as needed.  - Monitoring for bleeding.    15.) Rheumatoid arthritis with positive rheumatoid factor:  - Per chart review, patient had once been on methotrexate and prednisone but had stopped these medications a few years ago.  - Patient to f/u as outpatient if desired.    16.) Osteoporosis:  - Patient had been on prolia as outpatient. This is on hold for now.   - Further treatment as outpatient.    17.) History of immunodeficiency:   - IG subclass 2.  - Monitoring for evidence of new infection.     18.) Hypernatremia:  - This has been corrected for now.  - Monitoring labs as needed.    19.) Neuropathy; chronic pain syndrome:  - Patient on gabapentin 400 mg three times a day.  - Patient on roxanol 7.5 mg five times a day.  - Patient on methocarbamol 500 mg four times a day as needed.  - Patient had been on oxycodone as outpatient but this is on hold.     20.) Glaucoma; dry eyes:  - Patient not currently on medication for these problems.  - Patient to f/u with Ophthalmology as outpatient if further treatment is desired.    21.) Moderate malnutrition in the context of acute illness:  - Supplementing as able.    22.) Possible cognitive impairment:  - Further evaluation as outpatient.           Diet: Regular Diet Adult Thin Liquids (level 0)  Snacks/Supplements Adult: Ensure Enlive; With Meals    Mir Catheter:  Not present  Lines: None     Cardiac Monitoring: None  Code Status: No CPR- Do NOT Intubate      Clinically Significant Risk Factors                 # Thrombocytopenia: Lowest platelets = 111 in last 2 days, will monitor for bleeding   # Hypertension: Noted on problem list     # Acute Hypoxic Respiratory Failure: Documented O2 saturation < 90%. Continue supplemental oxygen as needed          # Moderate Malnutrition: based on nutrition assessment    # Financial/Environmental Concerns:           Social Drivers of Health    Tobacco Use: Medium Risk (10/18/2024)    Patient History     Smoking Tobacco Use: Former     Smokeless Tobacco Use: Never   Physical Activity: Inactive (10/14/2024)    Exercise Vital Sign     Days of Exercise per Week: 0 days     Minutes of Exercise per Session: 0 min   Stress: Stress Concern Present (10/14/2024)    [employer] - Occupational Stress Questionnaire     Feeling of Stress : Very much   Social Connections: Unknown (10/14/2024)    Social Connection and Isolation Panel [NHANES]     Frequency of Social Gatherings with Friends and Family: More than three times a week          Disposition Plan     Medically Ready for Discharge: Anticipated in 5+ Days             Gonzalez Watt MD  Hospitalist Service, [facility] 18  [facility]  Securely message with [employer] (more info)  Text page via [facility] Paging/Directory   See signed in provider for up to date coverage information  ______________________________________________________________________    Interval History   Patient noted feeling unwell. Patient noted concern about recurrent pneumonia.    Physical Exam   Vital Signs: Temp: 98.7  F (37.1  C) Temp src: Oral BP: 122/62 Pulse: 62   Resp: 18 SpO2: 97 % O2 Device: None (Room air) Oxygen Delivery: 1 LPM  Weight: 155 lbs 8 oz    General: Patient pauses to breathe when speaking.    Labs noted.  WBC 12.1; Hb 12.1; Platelets 111;

## 2025-02-11 NOTE — PLAN OF CARE
[id number]    Goal Outcome Evaluation:      Plan of Care Reviewed With: patient    Overall Patient Progress: no changeOverall Patient Progress: no change    Outcome Evaluation: No acute change in pt's condition this shift    Pt. is A & O X 4. stable. Makes needs known. Denies N/T. N/V, CP, lightheadedness SOB or acute distress.    Pt denied pain.  Activity: A1 with walker/GB. to transfer to the restroom. Independent with repositioning in bed. Mepilex dressing on bilateral upper extremities is CDI   Respiratory: Sats >90 on 2L of oxygen.  Continent of bladder & bowel. No discomfort with voiding.  Diet: Reg/Thin. Medications administered whole in apple sauce.  Pt. Slept most of the night except during cares  Lines/Drains: PIV    Discharge plan: possibility of being discharged to a Long term care facility.today.  Precaution: Fall risk,  Bed alarm on. Call button placed within reach. Plan of care is ongoing.

## 2025-02-12 ENCOUNTER — APPOINTMENT (OUTPATIENT)
Dept: CT IMAGING | Facility: CLINIC | Age: 80
DRG: 871 | End: 2025-02-12
Attending: INTERNAL MEDICINE
Payer: MEDICARE

## 2025-02-12 LAB
ANION GAP SERPL CALCULATED.3IONS-SCNC: 8 MMOL/L (ref 7–15)
BUN SERPL-MCNC: 18.9 MG/DL (ref 8–23)
CALCIUM SERPL-MCNC: 8.9 MG/DL (ref 8.8–10.4)
CHLORIDE SERPL-SCNC: 105 MMOL/L (ref 98–107)
CREAT SERPL-MCNC: 0.87 MG/DL (ref 0.67–1.17)
D DIMER PPP FEU-MCNC: 0.79 UG/ML FEU (ref 0–0.5)
EGFRCR SERPLBLD CKD-EPI 2021: 89 ML/MIN/1.73M2
GLUCOSE SERPL-MCNC: 90 MG/DL (ref 70–99)
HCO3 SERPL-SCNC: 31 MMOL/L (ref 22–29)
HOLD SPECIMEN: NORMAL
POTASSIUM SERPL-SCNC: 3.8 MMOL/L (ref 3.4–5.3)
SODIUM SERPL-SCNC: 144 MMOL/L (ref 135–145)

## 2025-02-12 PROCEDURE — 71275 CT ANGIOGRAPHY CHEST: CPT | Mod: 26 | Performed by: RADIOLOGY

## 2025-02-12 PROCEDURE — 250N000013 HC RX MED GY IP 250 OP 250 PS 637: Performed by: PHYSICIAN ASSISTANT

## 2025-02-12 PROCEDURE — 85379 FIBRIN DEGRADATION QUANT: CPT | Performed by: INTERNAL MEDICINE

## 2025-02-12 PROCEDURE — 120N000002 HC R&B MED SURG/OB UMMC

## 2025-02-12 PROCEDURE — 99232 SBSQ HOSP IP/OBS MODERATE 35: CPT | Performed by: INTERNAL MEDICINE

## 2025-02-12 PROCEDURE — 94640 AIRWAY INHALATION TREATMENT: CPT | Mod: 76

## 2025-02-12 PROCEDURE — 36415 COLL VENOUS BLD VENIPUNCTURE: CPT | Performed by: INTERNAL MEDICINE

## 2025-02-12 PROCEDURE — 999N000157 HC STATISTIC RCP TIME EA 10 MIN

## 2025-02-12 PROCEDURE — 250N000009 HC RX 250: Performed by: INTERNAL MEDICINE

## 2025-02-12 PROCEDURE — 250N000011 HC RX IP 250 OP 636: Performed by: INTERNAL MEDICINE

## 2025-02-12 PROCEDURE — 250N000013 HC RX MED GY IP 250 OP 250 PS 637

## 2025-02-12 PROCEDURE — 250N000009 HC RX 250

## 2025-02-12 PROCEDURE — 250N000012 HC RX MED GY IP 250 OP 636 PS 637: Performed by: INTERNAL MEDICINE

## 2025-02-12 PROCEDURE — 250N000013 HC RX MED GY IP 250 OP 250 PS 637: Performed by: INTERNAL MEDICINE

## 2025-02-12 PROCEDURE — 82310 ASSAY OF CALCIUM: CPT | Performed by: INTERNAL MEDICINE

## 2025-02-12 PROCEDURE — 250N000013 HC RX MED GY IP 250 OP 250 PS 637: Performed by: STUDENT IN AN ORGANIZED HEALTH CARE EDUCATION/TRAINING PROGRAM

## 2025-02-12 PROCEDURE — 71275 CT ANGIOGRAPHY CHEST: CPT

## 2025-02-12 PROCEDURE — 80048 BASIC METABOLIC PNL TOTAL CA: CPT | Performed by: INTERNAL MEDICINE

## 2025-02-12 RX ORDER — PREDNISONE 20 MG/1
40 TABLET ORAL DAILY
Status: DISCONTINUED | OUTPATIENT
Start: 2025-02-12 | End: 2025-02-14

## 2025-02-12 RX ORDER — IOPAMIDOL 755 MG/ML
100 INJECTION, SOLUTION INTRAVASCULAR ONCE
Status: COMPLETED | OUTPATIENT
Start: 2025-02-12 | End: 2025-02-12

## 2025-02-12 RX ADMIN — GABAPENTIN 400 MG: 300 CAPSULE ORAL at 08:12

## 2025-02-12 RX ADMIN — SODIUM CHLORIDE 75 ML: 9 INJECTION, SOLUTION INTRAVENOUS at 22:12

## 2025-02-12 RX ADMIN — Medication 1 HALF-TAB: at 08:12

## 2025-02-12 RX ADMIN — IPRATROPIUM BROMIDE AND ALBUTEROL SULFATE 3 ML: .5; 3 SOLUTION RESPIRATORY (INHALATION) at 08:06

## 2025-02-12 RX ADMIN — CARBIDOPA AND LEVODOPA 1 TABLET: 25; 100 TABLET ORAL at 15:14

## 2025-02-12 RX ADMIN — POTASSIUM CHLORIDE 20 MEQ: 1500 TABLET, EXTENDED RELEASE ORAL at 08:12

## 2025-02-12 RX ADMIN — MORPHINE SULFATE 7.5 MG: 20 SOLUTION ORAL at 15:12

## 2025-02-12 RX ADMIN — MORPHINE SULFATE 7.5 MG: 20 SOLUTION ORAL at 12:25

## 2025-02-12 RX ADMIN — Medication 1 TABLET: at 08:12

## 2025-02-12 RX ADMIN — GUAIFENESIN 600 MG: 600 TABLET ORAL at 20:15

## 2025-02-12 RX ADMIN — GABAPENTIN 400 MG: 300 CAPSULE ORAL at 20:15

## 2025-02-12 RX ADMIN — MORPHINE SULFATE 7.5 MG: 20 SOLUTION ORAL at 08:11

## 2025-02-12 RX ADMIN — GABAPENTIN 400 MG: 300 CAPSULE ORAL at 15:11

## 2025-02-12 RX ADMIN — Medication 1 HALF-TAB: at 20:15

## 2025-02-12 RX ADMIN — LEVOTHYROXINE SODIUM 112 MCG: 112 TABLET ORAL at 08:12

## 2025-02-12 RX ADMIN — MORPHINE SULFATE 10 MG: 20 SOLUTION ORAL at 03:34

## 2025-02-12 RX ADMIN — GUAIFENESIN 600 MG: 600 TABLET ORAL at 08:12

## 2025-02-12 RX ADMIN — Medication 600 MG: at 08:12

## 2025-02-12 RX ADMIN — CARBIDOPA AND LEVODOPA 1 TABLET: 25; 100 TABLET ORAL at 20:15

## 2025-02-12 RX ADMIN — METHOCARBAMOL 500 MG: 500 TABLET ORAL at 08:12

## 2025-02-12 RX ADMIN — IOPAMIDOL 54 ML: 755 INJECTION, SOLUTION INTRAVENOUS at 22:11

## 2025-02-12 RX ADMIN — ROSUVASTATIN 10 MG: 10 TABLET, FILM COATED ORAL at 08:12

## 2025-02-12 RX ADMIN — IPRATROPIUM BROMIDE AND ALBUTEROL SULFATE 3 ML: .5; 3 SOLUTION RESPIRATORY (INHALATION) at 16:06

## 2025-02-12 RX ADMIN — CARBIDOPA AND LEVODOPA 1 TABLET: 25; 100 TABLET ORAL at 08:12

## 2025-02-12 RX ADMIN — AMLODIPINE BESYLATE 5 MG: 5 TABLET ORAL at 22:30

## 2025-02-12 RX ADMIN — MORPHINE SULFATE 7.5 MG: 20 SOLUTION ORAL at 18:44

## 2025-02-12 RX ADMIN — Medication 25 MCG: at 08:12

## 2025-02-12 RX ADMIN — FUROSEMIDE 20 MG: 20 TABLET ORAL at 08:11

## 2025-02-12 RX ADMIN — IPRATROPIUM BROMIDE AND ALBUTEROL SULFATE 3 ML: .5; 3 SOLUTION RESPIRATORY (INHALATION) at 12:17

## 2025-02-12 RX ADMIN — PREDNISONE 20 MG: 20 TABLET ORAL at 08:13

## 2025-02-12 RX ADMIN — IPRATROPIUM BROMIDE AND ALBUTEROL SULFATE 3 ML: .5; 3 SOLUTION RESPIRATORY (INHALATION) at 20:49

## 2025-02-12 RX ADMIN — Medication 1 HALF-TAB: at 15:14

## 2025-02-12 RX ADMIN — POTASSIUM CHLORIDE 20 MEQ: 1500 TABLET, EXTENDED RELEASE ORAL at 20:15

## 2025-02-12 RX ADMIN — PREDNISONE 40 MG: 20 TABLET ORAL at 15:12

## 2025-02-12 RX ADMIN — FUROSEMIDE 20 MG: 20 TABLET ORAL at 15:12

## 2025-02-12 RX ADMIN — MORPHINE SULFATE 7.5 MG: 20 SOLUTION ORAL at 22:30

## 2025-02-12 ASSESSMENT — ACTIVITIES OF DAILY LIVING (ADL)
ADLS_ACUITY_SCORE: 43

## 2025-02-12 NOTE — PLAN OF CARE
Goal Outcome Evaluation:      Plan of Care Reviewed With: patient    VS: VSS   O2: 2 L NC Supplemental O2, SOB, exertion with activity, barrel chest utilizes nebulizers and inhalers for SOB   Output: Voids spontaneously in bathroom   Last BM: 2/9   Activity: Up with SBA of 1, 2 wheeled walker, stable gait, generalized weakness.   Skin: Ziyad appearance, skin tear to right forearm,  covered with mepilex, multiple healed bruises   Pain: Managed by prn pain medication    CMS: Alert and oriented /4, able to verbalize needs appropriately, calls appropriately   Dressing: Intact   Diet: REG-thin-whole   LDA: PIV right forearm, SL   Equipment: Walker, IV pump/pole   Plan: Pending placement LTC   Additional Info:

## 2025-02-12 NOTE — PROGRESS NOTES
"[facility]    Medicine Progress Note - Hospitalist Service, GOLD TEAM 18    Date of Admission:  1/4/2025    Assessment & Plan   A: Patient is a 80 y/o man who has a past medical history significant for thyroid cancer with lung metastases, Parkinson's disease, neuropathy, glaucoma, osteoporosis, chronic pain, severe COPD, hyperlipidemia, obstructive sleep apnea and coronary artery disease. Patient presented on 1/04/2025 with a 7 day history of productive cough and a 1 day history of weakness. CT pulmonary angiogram was negative for pulmonary embolism. Patient as admitted with  COPD exacerbation  and possible  community-acquired pneumonia as well.      Patient reportedly saw Pulmonology in Oct-2024 and COPD was moderately well-controlled at that time. Activity was apparently limited much more by his balance and parkinsonism than by respiratory status. Patient has antibiotics and prednisone available for outpatient COPD flares but apparently has not recently used this.      Patient apparently just entered hospice in the past week prior to hospital admission. Patient stated on admission that he was too weak and he and his daughter called their hospice agency and 911 was called and he was brought into the hospital. When provider asked on day of presentation whether patient wished for hospitalization and treatment of his potential infection, patient stated that he was seeking treatment and reported that his goal was to \"be able to do things for myself\". It was discussed that hospice typically focused on comfort rather than diagnostic testing and aggressive medical treatment. Patient acknowledged this but had seemed unsure of what his true desires for his care are moving forward.      Patient was on ceftriaxone from 04-Jan-2025 to 08-Jan-2025. Patient was azithromycin 500 mg daily from 05-Jan-2025 to 07-Jan-2025. Patient was on zosyn from 09-Jan-2025 to 14-Jan-2025. Patient is " now on azithromycin every other day. Patient did receive azithromycin 500 mg IV daily on 06-Feb-2025 and 07-Feb-2025.      Patient is currently on a prednisone taper.      Patient's overall prognosis is poor. Patient has been  seen by Palliative Care. Patient is DNR/DNI.     P:  1.) Pneumonia:  - Patient had received 3 consecutive days of azithromycin 500 mg daily from 06-Feb-2025 to 08-Feb-2025 for coverage of possible atypical bacterial pneumonia.   - Patient had been treated for community-acquired pneumonia on admission.   - Patient subsequently had been treated for possible hospital-acquired pneumonia with zosyn.  - Monitoring for recurrence.     2.) Sepsis:  - Patient was septic on admission secondary to pneumonia. Sepsis has resolved.  - Monitoring for recurrence.     3.) COPD exacerbation; patient oxygen and steroid dependent at baseline:  - Patient has been on prednisone taper. Prednisone dose was reduced from 40 mg daily to 20 mg daily on 11-Feb-2025.  - Given recurrent symptoms, will resume prednisone 40 mg daily and try a slower taper.   - Patient to eventually be tapered down to home dose of prednisone 2 mg daily.     4.) Chronic hypoxemic respiratory failure:  - Patient on 1-2 litres per minute of supplemental oxygen at baseline.  - Per patient's daughter, patient may be placed on BiPAP if needed. Patient reportedly had been on BiPAP intermittently that had helped with his breathing.   - In light of dyspnea, pleurisy and elevated d-dimer, will obtain CT pulmonary angiogram.     5.) Concern for pulmonary hypertension:  - Further evaluation as outpatient.     6.) Coronary artery disease with past MI with past stent placement, reportedly around 1997:  - Patient asymptomatic.   - Patient on metoprolol succinate and crestor.     7.) Hypertension:  - Monitoring on metoprolol succinate and lasix.     8.) History of traumatic subdural hemorrhage in 2023:  - This has resolved.  - No active intervention  indicated.     9.) Parkinson's disease, recent diagnosis:  - Patient on sinemet.  - Patient to f/u with Neurology as outpatient.     10.) Frequent falls:  - Patient has history of left femur fracture, inferior pubic rami fracture, rib fracture.  - Monitoring for safety.     11.) Metastatic thyroid cancer (multifocal papillary carcinoma):  - Patient is s/p thyroidectomy in 2021. Patient had received radioactive iodine in the past as well.  - Further surveillance as outpatient.  - Patient on levothyroxine 112 mcg daily.      12.) Right upper lobe lung mass; patient s/p radiation therapy with possible radiation pneumonitis:  - Patient had VATS biopsy in Feb-2022 that was non-diagnostic.  - To f/u as outpatient for further evaluation.     13.) New left lower lobe lung nodule:  - F/u imaging as outpatient.     14.) Thrombocytopenia:  - Platelet count was 14 on 06-Feb and was 111 today.  - Monitoring labs as needed.  - Monitoring for bleeding.     15.) Rheumatoid arthritis with positive rheumatoid factor:  - Per chart review, patient had once been on methotrexate and prednisone but had stopped these medications a few years ago.  - Patient to f/u as outpatient if desired.     16.) Osteoporosis:  - Patient had been on prolia as outpatient. This is on hold for now.   - Further treatment as outpatient.     17.) History of immunodeficiency:   - IG subclass 2.  - Monitoring for evidence of new infection.      18.) Hypernatremia:  - This has been corrected for now.  - Monitoring labs as needed.     19.) Neuropathy; chronic pain syndrome:  - Patient on gabapentin 400 mg three times a day.  - Patient on roxanol 7.5 mg five times a day.  - Patient on methocarbamol 500 mg four times a day as needed.  - Patient had been on oxycodone as outpatient but this is on hold.      20.) Glaucoma; dry eyes:  - Patient not currently on medication for these problems.  - Patient to f/u with Ophthalmology as outpatient if further treatment is  desired.     21.) Moderate malnutrition in the context of acute illness:  - Supplementing as able.     22.) Possible cognitive impairment:  - Further evaluation as outpatient.              Diet: Regular Diet Adult Thin Liquids (level 0)  Snacks/Supplements Adult: Ensure Enlive; With Meals    Mir Catheter: Not present  Lines: None     Cardiac Monitoring: None  Code Status: No CPR- Do NOT Intubate      Clinically Significant Risk Factors                 # Thrombocytopenia: Lowest platelets = 111 in last 2 days, will monitor for bleeding   # Hypertension: Noted on problem list     # Acute Hypoxic Respiratory Failure: Documented O2 saturation < 90%. Continue supplemental oxygen as needed          # Moderate Malnutrition: based on nutrition assessment    # Financial/Environmental Concerns:           Social Drivers of Health    Tobacco Use: Medium Risk (10/18/2024)    Patient History     Smoking Tobacco Use: Former     Smokeless Tobacco Use: Never   Physical Activity: Inactive (10/14/2024)    Exercise Vital Sign     Days of Exercise per Week: 0 days     Minutes of Exercise per Session: 0 min   Stress: Stress Concern Present (10/14/2024)    [employer] - Occupational Stress Questionnaire     Feeling of Stress : Very much   Social Connections: Unknown (10/14/2024)    Social Connection and Isolation Panel [NHANES]     Frequency of Social Gatherings with Friends and Family: More than three times a week          Disposition Plan     Medically Ready for Discharge: Anticipated in 5+ Days             Gonzalez Watt MD  Hospitalist Service, [facility] 18  [facility]  Securely message with [employer] (more info)  Text page via [facility] Paging/Directory   See signed in provider for up to date coverage information  ______________________________________________________________________    Interval History   Patient noted some increased dyspnea for the past few days.  Patient noted some pleurisy.    Physical Exam   Vital Signs: Temp: 97.8  F (36.6  C) Temp src: Oral BP: 129/67 Pulse: 52   Resp: 20 SpO2: 96 % O2 Device: Nasal cannula Oxygen Delivery: 2 LPM  Weight: 156 lbs 3.2 oz    General: Patient comfortable, NAD.  Heart: RRR, S1 S2 w/o murmurs.  Lungs: Breath sounds present but diminished with slight wheezing present.     Labs noted.  Sodium 144; Potassium 3.8; Creatinine 0.87;

## 2025-02-12 NOTE — PROGRESS NOTES
Assumed patient care around [id number].      Patient alert and oriented x4. Able to make needs known, call light within reach. Patient c/o SOB after walking to the bathroom,  O2 turn up to 2L via NC, sats> 90%. PRN Ativan administered for anxiety and SOB. Patient rating back pain 6/10, managed with scheduled pain meds. Scattered bruises to extremities, foam dressing x2 to right forearm. Right PIV patent and SL. Patient resting comfortably in bed.    Continue with plan of care.

## 2025-02-12 NOTE — PLAN OF CARE
Goal Outcome Evaluation:      Plan of Care Reviewed With: patient    Overall Patient Progress: no changeOverall Patient Progress: no change    Outcome Evaluation: Pt is A&Ox4 until evening when he had an episode of confusion where he did not know where he was and asked if he had to pay for assistance to go to the bathroom and if he had to use the pink slip to order meals. Pain was managed with scheduled morphine. Pt stated he feels more short of breath the last few days. Pt is on 2L of oxygen via NC. MD aware and ordered a d-dimer. If the d-dimer was high, a CT scan would be ordered. Pt needed at least a 20 gauge for the CT that was ordered and received it late afternoon by the RN flyer. CT was busy so they stated they would call after shift change to complete the exam. Oncoming RN was updated. Pt transfers to bathroom with SBA and personal FWW. Pt has baseline neuropathy to BLE. Bruising and scabbing to BUE and bilateral feet. Pt is able to make needs known, call light within reach. Continue POC.

## 2025-02-12 NOTE — PROGRESS NOTES
Care Management Follow Up    Length of Stay (days): 39    Expected Discharge Date: 02/15/25     Concerns to be Addressed: discharge planning     Patient plan of care discussed at interdisciplinary rounds: Yes    Anticipated Discharge Disposition: Long Term Care         Anticipated Discharge Services: Other (see comment) (current on home hospice)  Anticipated Discharge DME: None    Patient/family educated on Medicare website which has current facility and service quality ratings:    Education Provided on the Discharge Plan: Yes  Patient/Family in Agreement with the Plan:      Referrals Placed by CM/SW: Homecare (currently on home hospice)  Private pay costs discussed: Not applicable    Discussed  Partnership in Safe Discharge Planning  document with patient/family: No     Handoff Completed: No, handoff not indicated or clinically appropriate    Additional Information:  JARAD met with Pt, Pt's daughter and attending. Pt daughter states tat  while she likes the overall facility at [facility],  the rooms are very small as is  the bathroom.  Pt's daughter identifies  that Pt becomes  claustrophobic easily d/t his past incarceration history and  this is  why he leaves his room door  open in the hospital. Per provider, provider wants to run a test and possibly some imaging on Pt's lungs and states he would be more  comfortable discharging Pt on Friday than today. JARAD spoke with [facility] who said to call back on Friday and see if a single room that is larger has opened up or they can admit Pt to current planned room and then move him to a larger one when one becomes available. SW/RNCC  will follow up with [facility] on Friday to set up discharge if Pt is deemed medically safe/sound to discharge.    Next Steps:   JARAD/RNCC to continue to following for safe discharge and placement.    Antonio Houston, [facility] CenterSW  [facility] ICU & [place] [facility]   Ph: [phone]

## 2025-02-13 VITALS
SYSTOLIC BLOOD PRESSURE: 155 MMHG | RESPIRATION RATE: 20 BRPM | DIASTOLIC BLOOD PRESSURE: 81 MMHG | WEIGHT: 155.4 LBS | BODY MASS INDEX: 23.28 KG/M2 | OXYGEN SATURATION: 97 % | TEMPERATURE: 97.5 F | HEART RATE: 80 BPM

## 2025-02-13 DIAGNOSIS — E87.6 HYPOKALEMIA: ICD-10-CM

## 2025-02-13 PROCEDURE — 250N000013 HC RX MED GY IP 250 OP 250 PS 637: Performed by: INTERNAL MEDICINE

## 2025-02-13 PROCEDURE — 99232 SBSQ HOSP IP/OBS MODERATE 35: CPT | Performed by: INTERNAL MEDICINE

## 2025-02-13 PROCEDURE — 250N000013 HC RX MED GY IP 250 OP 250 PS 637: Performed by: PHYSICIAN ASSISTANT

## 2025-02-13 PROCEDURE — 250N000013 HC RX MED GY IP 250 OP 250 PS 637: Performed by: STUDENT IN AN ORGANIZED HEALTH CARE EDUCATION/TRAINING PROGRAM

## 2025-02-13 PROCEDURE — 250N000009 HC RX 250: Performed by: INTERNAL MEDICINE

## 2025-02-13 PROCEDURE — 250N000012 HC RX MED GY IP 250 OP 636 PS 637: Performed by: INTERNAL MEDICINE

## 2025-02-13 PROCEDURE — 250N000009 HC RX 250

## 2025-02-13 PROCEDURE — 94640 AIRWAY INHALATION TREATMENT: CPT

## 2025-02-13 PROCEDURE — 94640 AIRWAY INHALATION TREATMENT: CPT | Mod: 76

## 2025-02-13 PROCEDURE — 999N000157 HC STATISTIC RCP TIME EA 10 MIN

## 2025-02-13 PROCEDURE — 120N000002 HC R&B MED SURG/OB UMMC

## 2025-02-13 PROCEDURE — 250N000013 HC RX MED GY IP 250 OP 250 PS 637

## 2025-02-13 RX ORDER — PANTOPRAZOLE SODIUM 40 MG/1
40 TABLET, DELAYED RELEASE ORAL
Status: DISCONTINUED | OUTPATIENT
Start: 2025-02-13 | End: 2025-02-18 | Stop reason: HOSPADM

## 2025-02-13 RX ORDER — POTASSIUM CHLORIDE 1500 MG/1
20 TABLET, EXTENDED RELEASE ORAL 2 TIMES DAILY
Qty: 180 TABLET | Refills: 1 | Status: SHIPPED | OUTPATIENT
Start: 2025-02-13

## 2025-02-13 RX ORDER — LIDOCAINE 4 G/G
1 PATCH TOPICAL
Status: DISCONTINUED | OUTPATIENT
Start: 2025-02-14 | End: 2025-02-18 | Stop reason: HOSPADM

## 2025-02-13 RX ADMIN — CARBIDOPA AND LEVODOPA 1 TABLET: 25; 100 TABLET ORAL at 08:33

## 2025-02-13 RX ADMIN — CARBIDOPA AND LEVODOPA 1 TABLET: 25; 100 TABLET ORAL at 20:47

## 2025-02-13 RX ADMIN — IPRATROPIUM BROMIDE AND ALBUTEROL SULFATE 3 ML: .5; 3 SOLUTION RESPIRATORY (INHALATION) at 04:53

## 2025-02-13 RX ADMIN — GABAPENTIN 400 MG: 300 CAPSULE ORAL at 08:33

## 2025-02-13 RX ADMIN — IPRATROPIUM BROMIDE AND ALBUTEROL SULFATE 3 ML: .5; 3 SOLUTION RESPIRATORY (INHALATION) at 21:55

## 2025-02-13 RX ADMIN — Medication 1 HALF-TAB: at 14:44

## 2025-02-13 RX ADMIN — MORPHINE SULFATE 7.5 MG: 20 SOLUTION ORAL at 17:18

## 2025-02-13 RX ADMIN — MORPHINE SULFATE 7.5 MG: 20 SOLUTION ORAL at 22:29

## 2025-02-13 RX ADMIN — MORPHINE SULFATE 7.5 MG: 20 SOLUTION ORAL at 14:44

## 2025-02-13 RX ADMIN — Medication 1 TABLET: at 08:33

## 2025-02-13 RX ADMIN — MORPHINE SULFATE 7.5 MG: 20 SOLUTION ORAL at 08:32

## 2025-02-13 RX ADMIN — GUAIFENESIN 600 MG: 600 TABLET ORAL at 20:47

## 2025-02-13 RX ADMIN — Medication 1 HALF-TAB: at 08:33

## 2025-02-13 RX ADMIN — Medication 600 MG: at 08:34

## 2025-02-13 RX ADMIN — FUROSEMIDE 20 MG: 20 TABLET ORAL at 08:33

## 2025-02-13 RX ADMIN — IPRATROPIUM BROMIDE AND ALBUTEROL SULFATE 3 ML: .5; 3 SOLUTION RESPIRATORY (INHALATION) at 07:37

## 2025-02-13 RX ADMIN — GABAPENTIN 400 MG: 300 CAPSULE ORAL at 20:47

## 2025-02-13 RX ADMIN — FUROSEMIDE 20 MG: 20 TABLET ORAL at 17:18

## 2025-02-13 RX ADMIN — GUAIFENESIN 600 MG: 600 TABLET ORAL at 08:34

## 2025-02-13 RX ADMIN — IPRATROPIUM BROMIDE AND ALBUTEROL SULFATE 3 ML: .5; 3 SOLUTION RESPIRATORY (INHALATION) at 12:29

## 2025-02-13 RX ADMIN — MORPHINE SULFATE 10 MG: 20 SOLUTION ORAL at 04:22

## 2025-02-13 RX ADMIN — POTASSIUM CHLORIDE 20 MEQ: 1500 TABLET, EXTENDED RELEASE ORAL at 08:34

## 2025-02-13 RX ADMIN — ROSUVASTATIN 10 MG: 10 TABLET, FILM COATED ORAL at 08:33

## 2025-02-13 RX ADMIN — AZITHROMYCIN DIHYDRATE 500 MG: 250 TABLET ORAL at 08:34

## 2025-02-13 RX ADMIN — MORPHINE SULFATE 7.5 MG: 20 SOLUTION ORAL at 11:21

## 2025-02-13 RX ADMIN — IPRATROPIUM BROMIDE AND ALBUTEROL SULFATE 3 ML: .5; 3 SOLUTION RESPIRATORY (INHALATION) at 14:54

## 2025-02-13 RX ADMIN — GABAPENTIN 400 MG: 300 CAPSULE ORAL at 14:44

## 2025-02-13 RX ADMIN — PREDNISONE 40 MG: 20 TABLET ORAL at 08:33

## 2025-02-13 RX ADMIN — PANTOPRAZOLE SODIUM 40 MG: 40 TABLET, DELAYED RELEASE ORAL at 17:18

## 2025-02-13 RX ADMIN — METOPROLOL SUCCINATE 50 MG: 50 TABLET, EXTENDED RELEASE ORAL at 08:34

## 2025-02-13 RX ADMIN — POTASSIUM CHLORIDE 20 MEQ: 1500 TABLET, EXTENDED RELEASE ORAL at 20:47

## 2025-02-13 RX ADMIN — Medication 1 HALF-TAB: at 20:47

## 2025-02-13 RX ADMIN — LEVOTHYROXINE SODIUM 112 MCG: 112 TABLET ORAL at 08:33

## 2025-02-13 RX ADMIN — Medication 25 MCG: at 08:33

## 2025-02-13 RX ADMIN — AMLODIPINE BESYLATE 5 MG: 5 TABLET ORAL at 22:32

## 2025-02-13 RX ADMIN — CARBIDOPA AND LEVODOPA 1 TABLET: 25; 100 TABLET ORAL at 14:44

## 2025-02-13 RX ADMIN — IPRATROPIUM BROMIDE AND ALBUTEROL SULFATE 3 ML: .5; 3 SOLUTION RESPIRATORY (INHALATION) at 17:07

## 2025-02-13 ASSESSMENT — ACTIVITIES OF DAILY LIVING (ADL)
ADLS_ACUITY_SCORE: 43

## 2025-02-13 NOTE — PROGRESS NOTES
"[facility]    Medicine Progress Note - Hospitalist Service, GOLD TEAM 18    Date of Admission:  1/4/2025    Assessment & Plan   A: Patient is a 80 y/o man who has a past medical history significant for thyroid cancer with lung metastases, Parkinson's disease, neuropathy, glaucoma, osteoporosis, chronic pain, severe COPD, hyperlipidemia, obstructive sleep apnea and coronary artery disease. Patient presented on 1/04/2025 with a 7 day history of productive cough and a 1 day history of weakness. CT pulmonary angiogram was negative for pulmonary embolism. Patient as admitted with  COPD exacerbation  and possible  community-acquired pneumonia as well.      Patient reportedly saw Pulmonology in Oct-2024 and COPD was moderately well-controlled at that time. Activity was apparently limited much more by his balance and parkinsonism than by respiratory status. Patient has antibiotics and prednisone available for outpatient COPD flares but apparently has not recently used this.      Patient apparently just entered hospice in the past week prior to hospital admission. Patient stated on admission that he was too weak and he and his daughter called their hospice agency and 911 was called and he was brought into the hospital. When provider asked on day of presentation whether patient wished for hospitalization and treatment of his potential infection, patient stated that he was seeking treatment and reported that his goal was to \"be able to do things for myself\". It was discussed that hospice typically focused on comfort rather than diagnostic testing and aggressive medical treatment. Patient acknowledged this but had seemed unsure of what his true desires for his care are moving forward.      Patient was on ceftriaxone from 04-Jan-2025 to 08-Jan-2025. Patient was azithromycin 500 mg daily from 05-Jan-2025 to 07-Jan-2025. Patient was on zosyn from 09-Jan-2025 to 14-Jan-2025. Patient is " now on azithromycin every other day. Patient did receive azithromycin 500 mg IV daily on 06-Feb-2025 and 07-Feb-2025.      Patient is currently on a prednisone taper. CT pulmonary angiogram on 12-Feb-2025 was negative for pulmonary embolism.     Patient's overall prognosis is poor. Patient has been  seen by Palliative Care. Patient is DNR/DNI.     P:  1.) Pneumonia:  - Patient had received 3 consecutive days of azithromycin 500 mg daily from 06-Feb-2025 to 08-Feb-2025 for coverage of possible atypical bacterial pneumonia.   - Patient had been treated for community-acquired pneumonia on admission.   - Patient subsequently had been treated for possible hospital-acquired pneumonia with zosyn.  - Monitoring for recurrence.     2.) Sepsis:  - Patient was septic on admission secondary to pneumonia. Sepsis has resolved.  - Monitoring for recurrence.     3.) COPD exacerbation; patient oxygen and steroid dependent at baseline:  - Patient has been on prednisone taper. Prednisone dose was reduced from 40 mg daily to 20 mg daily on 11-Feb-2025.  - Given recurrent symptoms, prednisone 40 mg daily was resumed. Will try a slower taper.   - Patient to eventually be tapered down to home dose of prednisone 2 mg daily.     4.) Chronic hypoxemic respiratory failure:  - Patient on 1-2 litres per minute of supplemental oxygen at baseline.  - Per patient's daughter, patient may be placed on BiPAP if needed. Patient reportedly had been on BiPAP intermittently that had helped with his breathing.   - CT pulmonary angiogram was negative for pulmonary embolism.     5.) Concern for pulmonary hypertension:  - Further evaluation as outpatient.     6.) Coronary artery disease with past MI with past stent placement, reportedly around 1997:  - Patient asymptomatic.   - Patient on metoprolol succinate and crestor.     7.) Hypertension:  - Monitoring on metoprolol succinate and lasix.     8.) History of traumatic subdural hemorrhage in 2023:  - This  has resolved.  - No active intervention indicated.     9.) Parkinson's disease, recent diagnosis:  - Patient on sinemet.  - Patient to f/u with Neurology as outpatient.     10.) Frequent falls:  - Patient has history of left femur fracture, inferior pubic rami fracture, rib fracture.  - Monitoring for safety.     11.) Metastatic thyroid cancer (multifocal papillary carcinoma):  - Patient is s/p thyroidectomy in 2021. Patient had received radioactive iodine in the past as well.  - Further surveillance as outpatient.  - Patient on levothyroxine 112 mcg daily.      12.) Right upper lobe lung mass; patient s/p radiation therapy with possible radiation pneumonitis:  - Patient had VATS biopsy in Feb-2022 that was non-diagnostic.  - To f/u as outpatient for further evaluation.     13.) New left lower lobe lung nodule:  - F/u imaging as outpatient.     14.) Thrombocytopenia:  - Platelet count was 14 on 06-Feb and was 111 on 12-Feb-2025.  - Monitoring labs as needed.  - Monitoring for bleeding.     15.) Rheumatoid arthritis with positive rheumatoid factor:  - Per chart review, patient had once been on methotrexate and prednisone but had stopped these medications a few years ago.  - Patient to f/u as outpatient if desired.     16.) Osteoporosis:  - Patient had been on prolia as outpatient. This is on hold for now.   - Further treatment as outpatient.     17.) History of immunodeficiency:   - IG subclass 2.  - Monitoring for evidence of new infection.      18.) Hypernatremia:  - This has been corrected for now.  - Monitoring labs as needed.     19.) Neuropathy; chronic pain syndrome:  - Patient on gabapentin 400 mg three times a day.  - Patient on roxanol 7.5 mg five times a day.  - Patient on methocarbamol 500 mg four times a day as needed.  - Patient had been on oxycodone as outpatient but this is on hold.      20.) Glaucoma; dry eyes:  - Patient not currently on medication for these problems.  - Patient to f/u with  Ophthalmology as outpatient if further treatment is desired.     21.) Moderate malnutrition in the context of acute illness:  - Supplementing as able.     22.) Possible cognitive impairment:  - Further evaluation as outpatient.     23.) Possible esophagitis on CT scan:  - Patient to be on protonix 40 mg PO twice daily.   - Further evaluation as outpatient.           Diet: Regular Diet Adult Thin Liquids (level 0)  Snacks/Supplements Adult: Ensure Enlive; With Meals    Mir Catheter: Not present  Lines: None     Cardiac Monitoring: None  Code Status: No CPR- Do NOT Intubate      Clinically Significant Risk Factors                   # Hypertension: Noted on problem list     # Acute Hypoxic Respiratory Failure: Documented O2 saturation < 90%. Continue supplemental oxygen as needed          # Moderate Malnutrition: based on nutrition assessment    # Financial/Environmental Concerns:           Social Drivers of Health    Tobacco Use: Medium Risk (10/18/2024)    Patient History     Smoking Tobacco Use: Former     Smokeless Tobacco Use: Never   Physical Activity: Inactive (10/14/2024)    Exercise Vital Sign     Days of Exercise per Week: 0 days     Minutes of Exercise per Session: 0 min   Stress: Stress Concern Present (10/14/2024)    [employer] [place] [employer] - Occupational Stress Questionnaire     Feeling of Stress : Very much   Social Connections: Unknown (10/14/2024)    Social Connection and Isolation Panel [NHANES]     Frequency of Social Gatherings with Friends and Family: More than three times a week          Disposition Plan     Medically Ready for Discharge: Anticipated in 5+ days.             Gonzalez Watt MD  Hospitalist Service, [address]  Securely message with [employer] (more info)  Text page via [employer] Paging/Directory   See signed in provider for up to date coverage  information  ______________________________________________________________________    Interval History   Patient noted still having dyspnea and pleurisy. Patient noted having low back pain after he went into a lying position for CT yesterday evening,     Physical Exam   Vital Signs: Temp: 97.6  F (36.4  C) Temp src: Oral BP: 136/72 Pulse: 85   Resp: 20 SpO2: 96 % O2 Device: Nasal cannula Oxygen Delivery: 2 LPM  Weight: 155 lbs 6.4 oz    General: Patient dyspneic with movement and speaking.  Heart: RRR, S1 S2 w/o murmurs.  Lungs: Breath sounds present. Some coarsening and some wheezing.  Back: No discomfort with palpation of low back.      CT pulmonary angiogram:  1. Exam is negative for acute pulmonary embolism. No evidence for  right heart strain.       2. Patchy mixed ground glass and consolidative opacities in the  basilar lungs may represent infectious/inflammatory etiology.     3. Incidental 5 mm solid nodule in left lower lobe has been stable  since at least 2022 PETCT.     4. Findings compatible with chronic obstructive pulmonary disease with  widened AP diameter of the chest and moderate emphysematous changes.     5. Soft tissue density thickening of the right minor fissure has been  stable since at least 2023, although it was mass-like and  hypermetabolic on PETCT dated 2/2/22.      6. Mild circumferential wall thickening of the distal esophagus could  be due to esophagitis.

## 2025-02-13 NOTE — PLAN OF CARE
Goal Outcome Evaluation:      Plan of Care Reviewed With: patient    Overall Patient Progress: no changeOverall Patient Progress: no change    Outcome Evaluation: CT scan completed    Patient complaining of back pain after CT scan. Pain med given.     VS: /83 (BP Location: Left arm)   Pulse 88   Temp 97.5  F (36.4  C) (Oral)   Resp 18   Wt 70.9 kg (156 lb 3.2 oz)   SpO2 96%   BMI 23.40 kg/m      O2: 2 lpm via NC   Output: Continent of b/b   Last BM: 2/11   Activity:  Assist of 1 with personal walker   Skin: Bruised BUE. Skin tear LUE   Pain: Managed with scheduled  and prn morphine   CMS: A/O X 4   Dressing: Foam dressing on BUE changed   Diet: Reg diet   LDA: L PIV-SL   Equipment: Personal walker   Plan: Once medically stable, will discharge to LTC   Additional Info: CT scan completed to rule out PE. Patient has increase SOB, and lung pain. Md aware, following

## 2025-02-13 NOTE — PLAN OF CARE
Goal Outcome Evaluation:             Pt alert and oriented x3, pain 5-6/10, using morphine scheduled and had prn Ativan for increased dyspnea with tolerable results. A1 walker and gait belt. Eating 75% of meals. No wheezing or cough. On 2-3 L 02 NC. Offered mask for when he felt more dyspneic but he declined. Scattered bruising otherwise skin CDI. Continue POC

## 2025-02-14 LAB
ANION GAP SERPL CALCULATED.3IONS-SCNC: 9 MMOL/L (ref 7–15)
BUN SERPL-MCNC: 22.2 MG/DL (ref 8–23)
CALCIUM SERPL-MCNC: 8.8 MG/DL (ref 8.8–10.4)
CHLORIDE SERPL-SCNC: 105 MMOL/L (ref 98–107)
CREAT SERPL-MCNC: 0.85 MG/DL (ref 0.67–1.17)
EGFRCR SERPLBLD CKD-EPI 2021: 89 ML/MIN/1.73M2
ERYTHROCYTE [DISTWIDTH] IN BLOOD BY AUTOMATED COUNT: 18.6 % (ref 10–15)
GLUCOSE SERPL-MCNC: 107 MG/DL (ref 70–99)
HCO3 SERPL-SCNC: 31 MMOL/L (ref 22–29)
HCT VFR BLD AUTO: 37.6 % (ref 40–53)
HGB BLD-MCNC: 12.1 G/DL (ref 13.3–17.7)
HOLD SPECIMEN: NORMAL
MCH RBC QN AUTO: 31.1 PG (ref 26.5–33)
MCHC RBC AUTO-ENTMCNC: 32.2 G/DL (ref 31.5–36.5)
MCV RBC AUTO: 97 FL (ref 78–100)
PLATELET # BLD AUTO: 128 10E3/UL (ref 150–450)
POTASSIUM SERPL-SCNC: 4.2 MMOL/L (ref 3.4–5.3)
RBC # BLD AUTO: 3.89 10E6/UL (ref 4.4–5.9)
SODIUM SERPL-SCNC: 145 MMOL/L (ref 135–145)
WBC # BLD AUTO: 13.6 10E3/UL (ref 4–11)

## 2025-02-14 PROCEDURE — 250N000013 HC RX MED GY IP 250 OP 250 PS 637

## 2025-02-14 PROCEDURE — 999N000157 HC STATISTIC RCP TIME EA 10 MIN

## 2025-02-14 PROCEDURE — 99232 SBSQ HOSP IP/OBS MODERATE 35: CPT | Performed by: INTERNAL MEDICINE

## 2025-02-14 PROCEDURE — 250N000013 HC RX MED GY IP 250 OP 250 PS 637: Performed by: INTERNAL MEDICINE

## 2025-02-14 PROCEDURE — 250N000013 HC RX MED GY IP 250 OP 250 PS 637: Performed by: STUDENT IN AN ORGANIZED HEALTH CARE EDUCATION/TRAINING PROGRAM

## 2025-02-14 PROCEDURE — 250N000009 HC RX 250

## 2025-02-14 PROCEDURE — 80048 BASIC METABOLIC PNL TOTAL CA: CPT | Performed by: INTERNAL MEDICINE

## 2025-02-14 PROCEDURE — 85027 COMPLETE CBC AUTOMATED: CPT | Performed by: INTERNAL MEDICINE

## 2025-02-14 PROCEDURE — 120N000002 HC R&B MED SURG/OB UMMC

## 2025-02-14 PROCEDURE — 250N000012 HC RX MED GY IP 250 OP 636 PS 637: Performed by: INTERNAL MEDICINE

## 2025-02-14 PROCEDURE — 94640 AIRWAY INHALATION TREATMENT: CPT | Mod: 76

## 2025-02-14 PROCEDURE — 36415 COLL VENOUS BLD VENIPUNCTURE: CPT | Performed by: INTERNAL MEDICINE

## 2025-02-14 RX ADMIN — GABAPENTIN 400 MG: 300 CAPSULE ORAL at 20:04

## 2025-02-14 RX ADMIN — Medication 1 HALF-TAB: at 14:10

## 2025-02-14 RX ADMIN — IPRATROPIUM BROMIDE AND ALBUTEROL SULFATE 3 ML: .5; 3 SOLUTION RESPIRATORY (INHALATION) at 20:28

## 2025-02-14 RX ADMIN — Medication 1 HALF-TAB: at 20:04

## 2025-02-14 RX ADMIN — GABAPENTIN 400 MG: 300 CAPSULE ORAL at 08:08

## 2025-02-14 RX ADMIN — FUROSEMIDE 20 MG: 20 TABLET ORAL at 08:09

## 2025-02-14 RX ADMIN — GUAIFENESIN 600 MG: 600 TABLET ORAL at 20:04

## 2025-02-14 RX ADMIN — LEVOTHYROXINE SODIUM 112 MCG: 112 TABLET ORAL at 08:09

## 2025-02-14 RX ADMIN — IPRATROPIUM BROMIDE AND ALBUTEROL SULFATE 3 ML: .5; 3 SOLUTION RESPIRATORY (INHALATION) at 16:50

## 2025-02-14 RX ADMIN — PREDNISONE 40 MG: 20 TABLET ORAL at 08:09

## 2025-02-14 RX ADMIN — GABAPENTIN 400 MG: 300 CAPSULE ORAL at 14:10

## 2025-02-14 RX ADMIN — IPRATROPIUM BROMIDE AND ALBUTEROL SULFATE 3 ML: .5; 3 SOLUTION RESPIRATORY (INHALATION) at 13:54

## 2025-02-14 RX ADMIN — MORPHINE SULFATE 7.5 MG: 20 SOLUTION ORAL at 14:09

## 2025-02-14 RX ADMIN — ASPIRIN 81 MG: 81 TABLET, COATED ORAL at 20:04

## 2025-02-14 RX ADMIN — CARBIDOPA AND LEVODOPA 1 TABLET: 25; 100 TABLET ORAL at 08:09

## 2025-02-14 RX ADMIN — PANTOPRAZOLE SODIUM 40 MG: 40 TABLET, DELAYED RELEASE ORAL at 08:09

## 2025-02-14 RX ADMIN — METOPROLOL SUCCINATE 50 MG: 50 TABLET, EXTENDED RELEASE ORAL at 08:09

## 2025-02-14 RX ADMIN — Medication 1 HALF-TAB: at 08:08

## 2025-02-14 RX ADMIN — Medication 25 MCG: at 08:09

## 2025-02-14 RX ADMIN — CARBIDOPA AND LEVODOPA 1 TABLET: 25; 100 TABLET ORAL at 20:04

## 2025-02-14 RX ADMIN — MORPHINE SULFATE 7.5 MG: 20 SOLUTION ORAL at 08:07

## 2025-02-14 RX ADMIN — GUAIFENESIN 600 MG: 600 TABLET ORAL at 08:09

## 2025-02-14 RX ADMIN — AMLODIPINE BESYLATE 5 MG: 5 TABLET ORAL at 22:27

## 2025-02-14 RX ADMIN — POTASSIUM CHLORIDE 20 MEQ: 1500 TABLET, EXTENDED RELEASE ORAL at 20:04

## 2025-02-14 RX ADMIN — Medication 1 TABLET: at 08:08

## 2025-02-14 RX ADMIN — MORPHINE SULFATE 7.5 MG: 20 SOLUTION ORAL at 18:00

## 2025-02-14 RX ADMIN — PANTOPRAZOLE SODIUM 40 MG: 40 TABLET, DELAYED RELEASE ORAL at 16:04

## 2025-02-14 RX ADMIN — MORPHINE SULFATE 7.5 MG: 20 SOLUTION ORAL at 11:50

## 2025-02-14 RX ADMIN — ROSUVASTATIN 10 MG: 10 TABLET, FILM COATED ORAL at 08:09

## 2025-02-14 RX ADMIN — FUROSEMIDE 20 MG: 20 TABLET ORAL at 16:04

## 2025-02-14 RX ADMIN — IPRATROPIUM BROMIDE AND ALBUTEROL SULFATE 3 ML: .5; 3 SOLUTION RESPIRATORY (INHALATION) at 07:51

## 2025-02-14 RX ADMIN — CARBIDOPA AND LEVODOPA 1 TABLET: 25; 100 TABLET ORAL at 14:10

## 2025-02-14 RX ADMIN — MORPHINE SULFATE 7.5 MG: 20 SOLUTION ORAL at 22:27

## 2025-02-14 RX ADMIN — POTASSIUM CHLORIDE 20 MEQ: 1500 TABLET, EXTENDED RELEASE ORAL at 08:09

## 2025-02-14 RX ADMIN — Medication 600 MG: at 08:09

## 2025-02-14 ASSESSMENT — ACTIVITIES OF DAILY LIVING (ADL)
ADLS_ACUITY_SCORE: 43

## 2025-02-14 NOTE — PROGRESS NOTES
CLINICAL NUTRITION SERVICES - REASSESSMENT NOTE     RECOMMENDATIONS FOR MDs/PROVIDERS TO ORDER:  None at present.    Malnutrition Status:    Moderate malnutrition in the context of chronic illness    Registered Dietitian Interventions:  Continue Ensure Enlive BID    Future/Additional Recommendations:  Monitor oral intake, weight, supplement preferences.     SUBJECTIVE INFORMATION  Assessed patient in room.    CURRENT NUTRITION ORDERS  Diet: Regular  - Ensure Enlive BID    CURRENT INTAKE/TOLERANCE  Pt consuming 50-75% of meals per flowsheet. Minimal intakes recorded this week. Pt ordering 3 meals a day per [employer] [facility].     NEW FINDINGS  Pt reports that his appetite is stable/normal. He continues to drink 1.5-2 Ensure a day.    Weight: last wt (2/14): 71.4 kg. Wt stable during admission. 7.9% wt loss in 9 months (down from 77.5 kg on 5/8/24).  Skin/wounds: no findings  GI symptoms: LBM 2/13  Nutrition-relevant labs: Reviewed  Nutrition-relevant medications:  calcium carbonate 500 mg, lasix BID, thera-vit-m, potassium chloride 20 mEq BID, prednisone, vitamin D3 25 mcg    MALNUTRITION  % Intake: No decreased intake noted  % Weight Loss: Weight loss does not meet criteria   Subcutaneous Fat Loss: Orbital: Mild  Muscle Loss: Wasting of the temples (temporalis muscle): Mild  Fluid Accumulation/Edema: None noted  Malnutrition Diagnosis: Moderate malnutrition in the context of chronic illness  Malnutrition Present on Admission: Unable to assess    EVALUATION OF THE PROGRESS TOWARD GOALS   Previous Goals  Patient to consume % of nutritionally adequate meal trays TID, or the equivalent with supplements/snacks.  Evaluation: Progressing    Previous Nutrition Diagnosis  Predicted inadequate protein-energy intake related to variable appetite as evidenced by pt reliant on PO intakes to meet 100% of nutritional needs with potential for variation  Evaluation: No change    NUTRITION DIAGNOSIS  Predicted inadequate  "protein-energy intake related to variable appetite as evidenced by pt reliant on PO intakes to meet 100% of nutritional needs with potential for variation    INTERVENTIONS  See nutrition interventions above    Goals  Patient to consume % of nutritionally adequate meal trays TID, or the equivalent with supplements/snacks.     Monitoring/Evaluation      Progress toward goals will be monitored and evaluated per policy.    Dheeraj Campa, RD, LD  Available on Vocera  Weekend/Holiday RD Vocera - \"Weekend Clinical Dietitian\"  No longer available by paging  "

## 2025-02-14 NOTE — PROGRESS NOTES
"[facility]    Medicine Progress Note - Hospitalist Service, GOLD TEAM 18    Date of Admission:  1/4/2025    Assessment & Plan   A: Patient is a 80 y/o man who has a past medical history significant for thyroid cancer with lung metastases, Parkinson's disease, neuropathy, glaucoma, osteoporosis, chronic pain, severe COPD, hyperlipidemia, obstructive sleep apnea and coronary artery disease. Patient presented on 1/04/2025 with a 7 day history of productive cough and a 1 day history of weakness. CT pulmonary angiogram was negative for pulmonary embolism. Patient as admitted with  COPD exacerbation  and possible  community-acquired pneumonia as well.      Patient reportedly saw Pulmonology in Oct-2024 and COPD was moderately well-controlled at that time. Activity was apparently limited much more by his balance and parkinsonism than by respiratory status. Patient has antibiotics and prednisone available for outpatient COPD flares but apparently has not recently used this.      Patient apparently just entered hospice in the past week prior to hospital admission. Patient stated on admission that he was too weak and he and his daughter called their hospice agency and 911 was called and he was brought into the hospital. When provider asked on day of presentation whether patient wished for hospitalization and treatment of his potential infection, patient stated that he was seeking treatment and reported that his goal was to \"be able to do things for myself\". It was discussed that hospice typically focused on comfort rather than diagnostic testing and aggressive medical treatment. Patient acknowledged this but had seemed unsure of what his true desires for his care are moving forward.      Patient was on ceftriaxone from 04-Jan-2025 to 08-Jan-2025. Patient was azithromycin 500 mg daily from 05-Jan-2025 to 07-Jan-2025. Patient was on zosyn from 09-Jan-2025 to 14-Jan-2025. Patient is " now on azithromycin every other day. Patient did receive azithromycin 500 mg IV daily on 06-Feb-2025 and 07-Feb-2025.      Patient is currently on a prednisone taper. CT pulmonary angiogram on 12-Feb-2025 was negative for pulmonary embolism.     Patient's overall prognosis is poor. Patient has been  seen by Palliative Care. Patient is DNR/DNI.     P:  1.) Pneumonia:  - Patient had received 3 consecutive days of azithromycin 500 mg daily from 06-Feb-2025 to 08-Feb-2025 for coverage of possible atypical bacterial pneumonia.   - Patient had been treated for community-acquired pneumonia on admission.   - Patient subsequently had been treated for possible hospital-acquired pneumonia with zosyn.  - Monitoring for recurrence.     2.) Sepsis:  - Patient was septic on admission secondary to pneumonia. Sepsis has resolved.  - Monitoring for recurrence.     3.) COPD exacerbation; patient oxygen and steroid dependent at baseline:  - Patient has been on prednisone taper. Prednisone dose was reduced from 40 mg daily to 20 mg daily on 11-Feb-2025.  - Given recurrent symptoms, prednisone 40 mg daily was resumed. Will reduce prednisone to 30 mg daily starting tomorrow (15-Feb-2024). Plan is for slower taper.    - Patient to eventually be tapered down to home dose of prednisone 2 mg daily.     4.) Chronic hypoxemic respiratory failure:  - Patient on 1-2 litres per minute of supplemental oxygen at baseline.  - Per patient's daughter, patient may be placed on BiPAP if needed. Patient reportedly had been on BiPAP intermittently that had helped with his breathing.   - CT pulmonary angiogram was negative for pulmonary embolism.     5.) Concern for pulmonary hypertension:  - Further evaluation as outpatient.     6.) Coronary artery disease with past MI with past stent placement, reportedly around 1997:  - Patient asymptomatic.   - Patient on metoprolol succinate and crestor.  - Resuming aspirin.     7.) Hypertension:  - Monitoring on  metoprolol succinate and lasix.     8.) History of traumatic subdural hemorrhage in 2023:  - This has resolved.  - No active intervention indicated.     9.) Parkinson's disease, recent diagnosis:  - Patient on sinemet.  - Patient to f/u with Neurology as outpatient.     10.) Frequent falls:  - Patient has history of left femur fracture, inferior pubic rami fracture, rib fracture.  - Monitoring for safety.     11.) Metastatic thyroid cancer (multifocal papillary carcinoma):  - Patient is s/p thyroidectomy in 2021. Patient had received radioactive iodine in the past as well.  - Further surveillance as outpatient.  - Patient on levothyroxine 112 mcg daily.      12.) Right upper lobe lung mass; patient s/p radiation therapy with possible radiation pneumonitis:  - Patient had VATS biopsy in Feb-2022 that was non-diagnostic.  - To f/u as outpatient for further evaluation.     13.) New left lower lobe lung nodule:  - F/u imaging as outpatient.     14.) Thrombocytopenia:  - Platelet count was 14 on 06-Feb and was 111 on 12-Feb-2025.  - Monitoring labs as needed.  - Monitoring for bleeding.     15.) Rheumatoid arthritis with positive rheumatoid factor:  - Per chart review, patient had once been on methotrexate and prednisone but had stopped these medications a few years ago.  - Patient to f/u as outpatient if desired.     16.) Osteoporosis:  - Patient had been on prolia as outpatient. This is on hold for now.   - Further treatment as outpatient.     17.) History of immunodeficiency:   - IG subclass 2.  - Monitoring for evidence of new infection.      18.) Hypernatremia:  - This has been corrected for now.  - Monitoring labs as needed.     19.) Neuropathy; chronic pain syndrome:  - Patient on gabapentin 400 mg three times a day.  - Patient on roxanol 7.5 mg five times a day.  - Patient on methocarbamol 500 mg four times a day as needed.  - Patient had been on oxycodone as outpatient but this is on hold.      20.) Glaucoma;  dry eyes:  - Patient not currently on medication for these problems.  - Patient to f/u with Ophthalmology as outpatient if further treatment is desired.     21.) Moderate malnutrition in the context of acute illness:  - Supplementing as able.     22.) Possible cognitive impairment:  - Further evaluation as outpatient.      23.) Possible esophagitis on CT scan:  - Patient to be on protonix 40 mg PO twice daily.   - Further evaluation as outpatient.              Diet: Regular Diet Adult Thin Liquids (level 0)  Snacks/Supplements Adult: Ensure Enlive; With Meals    Mir Catheter: Not present  Lines: None     Cardiac Monitoring: None  Code Status: No CPR- Do NOT Intubate      Clinically Significant Risk Factors                   # Hypertension: Noted on problem list     # Acute Hypoxic Respiratory Failure: Documented O2 saturation < 90%. Continue supplemental oxygen as needed          # Moderate Malnutrition: based on nutrition assessment    # Financial/Environmental Concerns:           Social Drivers of Health    Tobacco Use: Medium Risk (10/18/2024)    Patient History     Smoking Tobacco Use: Former     Smokeless Tobacco Use: Never   Physical Activity: Inactive (10/14/2024)    Exercise Vital Sign     Days of Exercise per Week: 0 days     Minutes of Exercise per Session: 0 min   Stress: Stress Concern Present (10/14/2024)    [employer] - Occupational Stress Questionnaire     Feeling of Stress : Very much   Social Connections: Unknown (10/14/2024)    Social Connection and Isolation Panel [NHANES]     Frequency of Social Gatherings with Friends and Family: More than three times a week          Disposition Plan     Medically Ready for Discharge: Anticipated in 2-4 Days             Gonzalez Watt MD  Hospitalist Service, [facility] 18  [facility]  Securely message with [employer] (more info)  Text page via [employer] Paging/Directory   See signed in provider  for up to date coverage information  ______________________________________________________________________    Interval History   Patient noted no new problems.    Physical Exam   Vital Signs: Temp: 98.2  F (36.8  C) Temp src: Oral BP: 135/76 Pulse: 64   Resp: 17 SpO2: 97 % O2 Device: Nasal cannula Oxygen Delivery: 1.5 LPM  Weight: 157 lbs 6.4 oz    General: Patient dyspneic.   Heart: RRR, S1 S2 w/o murmurs.  Lungs: Breath sounds present with soft end-expiratory wheezing.    Labs noted.  Sodium 145; Potassium 4.2; Creatinine 0.85;

## 2025-02-14 NOTE — PROGRESS NOTES
Care Management Follow Up    Length of Stay (days): 41    Expected Discharge Date: 02/17/2025     Concerns to be Addressed: discharge planning     Patient plan of care discussed at interdisciplinary rounds: Yes    Anticipated Discharge Disposition: Long Term Care      Anticipated Discharge Services: Other (see comment) (current on home hospice)  Anticipated Discharge DME: None    Patient/family educated on Medicare website which has current facility and service quality ratings:    Education Provided on the Discharge Plan: Yes  Patient/Family in Agreement with the Plan:      Referrals Placed by CM/SW: Homecare (currently on home hospice)  Private pay costs discussed: Not applicable    Discussed  Partnership in Safe Discharge Planning  document with patient/family: Yes:     Handoff Completed: No, handoff not indicated or clinically appropriate    Additional Information:  JARAD connected with attending who identified he would be more  comfortable discharging Pt Monday than today. As per previous feedback from [facility] home admissions staff JARAD attempted to connect today Friday to see if a larger private room  may be available.  JARAD sent EPIC-In-basket communication to [facility] Admissions staff. JARAD left VM for  [facility] home admissions staff.  JARAD will continue to attempt to connect  with [facility] staff who have identified an initial semi  private room  being available once Pt is deemed  ready per provider.      Next Steps:   JARAD/RNCC to continue following for safe discharge planning and placement.    Antonio Houston, [facility] Hospital[facility]  [facility] [place] [facility]   Ph: [phone]

## 2025-02-14 NOTE — PLAN OF CARE
Goal Outcome Evaluation:        VS: BP (!) 155/81 (Patient Position: Sitting)   Pulse 80   Temp 97.5  F (36.4  C) (Oral)   Resp 20   Wt 70.5 kg (155 lb 6.4 oz)   SpO2 97%   BMI 23.28 kg/m      O2: 1.5 lpm via NC   Output: Continent of b/b   Last BM: 2/13   Activity:  Assist of 1 with personal walker   Skin: Bruised BUE. Skin tear LUE   Pain: Managed with scheduled   CMS: A/O X 4. Baseline neuropathy   Dressing: Foam dressing on BUE   Diet: Reg diet   LDA: L PIV-SL   Equipment: Personal walker   Plan: Once medically stable, will discharge to LTC   Additional Info:

## 2025-02-14 NOTE — PLAN OF CARE
Goal Outcome Evaluation:      Plan of Care Reviewed With: patient    Overall Patient Progress: no changeOverall Patient Progress: no change    Outcome Evaluation: Pt is A&Ox4. Pain was managed with scheduled morphine. Pt is on 1.5 L of oxygen via NC with dyspnea and L lung pain with talking and all activities. Pt transfers to bathroom with SBA and personal FWW. Pt has baseline neuropathy to BLE. Bruising and scabbing to BUE and bilateral feet. Pt is able to make needs known, call light within reach. Continue POC.

## 2025-02-15 PROCEDURE — 250N000013 HC RX MED GY IP 250 OP 250 PS 637: Performed by: INTERNAL MEDICINE

## 2025-02-15 PROCEDURE — 250N000012 HC RX MED GY IP 250 OP 636 PS 637: Performed by: INTERNAL MEDICINE

## 2025-02-15 PROCEDURE — 250N000013 HC RX MED GY IP 250 OP 250 PS 637: Performed by: STUDENT IN AN ORGANIZED HEALTH CARE EDUCATION/TRAINING PROGRAM

## 2025-02-15 PROCEDURE — 99232 SBSQ HOSP IP/OBS MODERATE 35: CPT | Performed by: INTERNAL MEDICINE

## 2025-02-15 PROCEDURE — 250N000013 HC RX MED GY IP 250 OP 250 PS 637

## 2025-02-15 PROCEDURE — 120N000002 HC R&B MED SURG/OB UMMC

## 2025-02-15 PROCEDURE — 94640 AIRWAY INHALATION TREATMENT: CPT | Mod: 76

## 2025-02-15 PROCEDURE — 999N000157 HC STATISTIC RCP TIME EA 10 MIN

## 2025-02-15 PROCEDURE — 94640 AIRWAY INHALATION TREATMENT: CPT

## 2025-02-15 PROCEDURE — 250N000013 HC RX MED GY IP 250 OP 250 PS 637: Performed by: PHYSICIAN ASSISTANT

## 2025-02-15 PROCEDURE — 250N000009 HC RX 250

## 2025-02-15 RX ORDER — SULFAMETHOXAZOLE AND TRIMETHOPRIM 400; 80 MG/1; MG/1
1 TABLET ORAL DAILY
Status: DISCONTINUED | OUTPATIENT
Start: 2025-02-15 | End: 2025-02-18 | Stop reason: HOSPADM

## 2025-02-15 RX ADMIN — MORPHINE SULFATE 10 MG: 20 SOLUTION ORAL at 04:34

## 2025-02-15 RX ADMIN — ASPIRIN 81 MG: 81 TABLET, COATED ORAL at 08:41

## 2025-02-15 RX ADMIN — GUAIFENESIN 600 MG: 600 TABLET ORAL at 08:41

## 2025-02-15 RX ADMIN — GABAPENTIN 400 MG: 300 CAPSULE ORAL at 13:58

## 2025-02-15 RX ADMIN — POTASSIUM CHLORIDE 20 MEQ: 1500 TABLET, EXTENDED RELEASE ORAL at 08:42

## 2025-02-15 RX ADMIN — CARBIDOPA AND LEVODOPA 1 TABLET: 25; 100 TABLET ORAL at 13:59

## 2025-02-15 RX ADMIN — Medication 600 MG: at 08:42

## 2025-02-15 RX ADMIN — GUAIFENESIN 600 MG: 600 TABLET ORAL at 19:51

## 2025-02-15 RX ADMIN — CARBIDOPA AND LEVODOPA 1 TABLET: 25; 100 TABLET ORAL at 08:42

## 2025-02-15 RX ADMIN — MORPHINE SULFATE 7.5 MG: 20 SOLUTION ORAL at 11:13

## 2025-02-15 RX ADMIN — Medication 1 TABLET: at 08:41

## 2025-02-15 RX ADMIN — AMLODIPINE BESYLATE 5 MG: 5 TABLET ORAL at 22:15

## 2025-02-15 RX ADMIN — GABAPENTIN 400 MG: 300 CAPSULE ORAL at 08:41

## 2025-02-15 RX ADMIN — MORPHINE SULFATE 7.5 MG: 20 SOLUTION ORAL at 13:58

## 2025-02-15 RX ADMIN — CARBIDOPA AND LEVODOPA 1 TABLET: 25; 100 TABLET ORAL at 19:51

## 2025-02-15 RX ADMIN — Medication 1 HALF-TAB: at 13:59

## 2025-02-15 RX ADMIN — GABAPENTIN 400 MG: 300 CAPSULE ORAL at 19:50

## 2025-02-15 RX ADMIN — Medication 1 HALF-TAB: at 19:51

## 2025-02-15 RX ADMIN — AZITHROMYCIN DIHYDRATE 500 MG: 250 TABLET ORAL at 08:41

## 2025-02-15 RX ADMIN — Medication 1 HALF-TAB: at 08:41

## 2025-02-15 RX ADMIN — LEVOTHYROXINE SODIUM 112 MCG: 112 TABLET ORAL at 08:41

## 2025-02-15 RX ADMIN — MORPHINE SULFATE 7.5 MG: 20 SOLUTION ORAL at 17:36

## 2025-02-15 RX ADMIN — MORPHINE SULFATE 7.5 MG: 20 SOLUTION ORAL at 08:40

## 2025-02-15 RX ADMIN — ASPIRIN 81 MG: 81 TABLET, COATED ORAL at 19:51

## 2025-02-15 RX ADMIN — FUROSEMIDE 20 MG: 20 TABLET ORAL at 16:04

## 2025-02-15 RX ADMIN — IPRATROPIUM BROMIDE AND ALBUTEROL SULFATE 3 ML: .5; 3 SOLUTION RESPIRATORY (INHALATION) at 11:39

## 2025-02-15 RX ADMIN — IPRATROPIUM BROMIDE AND ALBUTEROL SULFATE 3 ML: .5; 3 SOLUTION RESPIRATORY (INHALATION) at 20:46

## 2025-02-15 RX ADMIN — ROSUVASTATIN 10 MG: 10 TABLET, FILM COATED ORAL at 08:41

## 2025-02-15 RX ADMIN — FUROSEMIDE 20 MG: 20 TABLET ORAL at 08:42

## 2025-02-15 RX ADMIN — PANTOPRAZOLE SODIUM 40 MG: 40 TABLET, DELAYED RELEASE ORAL at 16:04

## 2025-02-15 RX ADMIN — MORPHINE SULFATE 7.5 MG: 20 SOLUTION ORAL at 22:15

## 2025-02-15 RX ADMIN — POTASSIUM CHLORIDE 20 MEQ: 1500 TABLET, EXTENDED RELEASE ORAL at 19:51

## 2025-02-15 RX ADMIN — Medication 25 MCG: at 08:41

## 2025-02-15 RX ADMIN — PREDNISONE 30 MG: 20 TABLET ORAL at 08:41

## 2025-02-15 RX ADMIN — METOPROLOL SUCCINATE 50 MG: 50 TABLET, EXTENDED RELEASE ORAL at 08:41

## 2025-02-15 RX ADMIN — IPRATROPIUM BROMIDE AND ALBUTEROL SULFATE 3 ML: .5; 3 SOLUTION RESPIRATORY (INHALATION) at 07:32

## 2025-02-15 RX ADMIN — IPRATROPIUM BROMIDE AND ALBUTEROL SULFATE 3 ML: .5; 3 SOLUTION RESPIRATORY (INHALATION) at 15:55

## 2025-02-15 RX ADMIN — SULFAMETHOXAZOLE AND TRIMETHOPRIM 1 TABLET: 400; 80 TABLET ORAL at 13:59

## 2025-02-15 RX ADMIN — PANTOPRAZOLE SODIUM 40 MG: 40 TABLET, DELAYED RELEASE ORAL at 08:42

## 2025-02-15 ASSESSMENT — ACTIVITIES OF DAILY LIVING (ADL)
ADLS_ACUITY_SCORE: 43

## 2025-02-15 NOTE — PROGRESS NOTES
"[facility]    Medicine Progress Note - Hospitalist Service, GOLD TEAM 18    Date of Admission:  1/4/2025    Assessment & Plan   A: Patient is a 78 y/o man who has a past medical history significant for thyroid cancer with lung metastases, Parkinson's disease, neuropathy, glaucoma, osteoporosis, chronic pain, severe COPD, hyperlipidemia, obstructive sleep apnea and coronary artery disease. Patient presented on 1/04/2025 with a 7 day history of productive cough and a 1 day history of weakness. CT pulmonary angiogram was negative for pulmonary embolism. Patient as admitted with  COPD exacerbation  and possible  community-acquired pneumonia as well.      Patient reportedly saw Pulmonology in Oct-2024 and COPD was moderately well-controlled at that time. Activity was apparently limited much more by his balance and parkinsonism than by respiratory status. Patient has antibiotics and prednisone available for outpatient COPD flares but apparently has not recently used this.      Patient apparently just entered hospice in the past week prior to hospital admission. Patient stated on admission that he was too weak and he and his daughter called their hospice agency and 911 was called and he was brought into the hospital. When provider asked on day of presentation whether patient wished for hospitalization and treatment of his potential infection, patient stated that he was seeking treatment and reported that his goal was to \"be able to do things for myself\". It was discussed that hospice typically focused on comfort rather than diagnostic testing and aggressive medical treatment. Patient acknowledged this but had seemed unsure of what his true desires for his care are moving forward.      Patient was on ceftriaxone from 04-Jan-2025 to 08-Jan-2025. Patient was azithromycin 500 mg daily from 05-Jan-2025 to 07-Jan-2025. Patient was on zosyn from 09-Jan-2025 to 14-Jan-2025. Patient is " now on azithromycin every other day. Patient did receive azithromycin 500 mg IV daily on 06-Feb-2025 and 07-Feb-2025.      Patient is currently on a prednisone taper. CT pulmonary angiogram on 12-Feb-2025 was negative for pulmonary embolism.     Patient's overall prognosis is poor. Patient has been  seen by Palliative Care. Patient is DNR/DNI.     P:  1.) Pneumonia:  - Patient had received 3 consecutive days of azithromycin 500 mg daily from 06-Feb-2025 to 08-Feb-2025 for coverage of possible atypical bacterial pneumonia.   - Patient had been treated for community-acquired pneumonia on admission.   - Patient subsequently had been treated for possible hospital-acquired pneumonia with zosyn.  - Monitoring for recurrence.     2.) Sepsis:  - Patient was septic on admission secondary to pneumonia. Sepsis has resolved.  - Monitoring for recurrence.     3.) COPD exacerbation; patient oxygen and steroid dependent at baseline:  - Patient has been on prednisone taper. Prednisone dose was reduced from 40 mg daily to 20 mg daily on 11-Feb-2025.  - Given recurrent symptoms, prednisone 40 mg daily was resumed. Patient now on prednisone 30 mg daily. Plan is for slower taper. Patient to be on bactrim for PJP prophylaxis.  - Patient to eventually be tapered down to home dose of prednisone 2 mg daily.     4.) Chronic hypoxemic respiratory failure:  - Patient on 1-2 litres per minute of supplemental oxygen at baseline.  - Per patient's daughter, patient may be placed on BiPAP if needed. Patient reportedly had been on BiPAP intermittently that had helped with his breathing.   - CT pulmonary angiogram was negative for pulmonary embolism.     5.) Concern for pulmonary hypertension:  - Further evaluation as outpatient.     6.) Coronary artery disease with past MI with past stent placement, reportedly around 1997:  - Patient asymptomatic.   - Patient on metoprolol succinate and crestor.  - Resuming aspirin.     7.) Hypertension:  -  Monitoring on metoprolol succinate and lasix.     8.) History of traumatic subdural hemorrhage in 2023:  - This has resolved.  - No active intervention indicated.     9.) Parkinson's disease, recent diagnosis:  - Patient on sinemet.  - Patient to f/u with Neurology as outpatient.     10.) Frequent falls:  - Patient has history of left femur fracture, inferior pubic rami fracture, rib fracture.  - Monitoring for safety.     11.) Metastatic thyroid cancer (multifocal papillary carcinoma):  - Patient is s/p thyroidectomy in 2021. Patient had received radioactive iodine in the past as well.  - Further surveillance as outpatient.  - Patient on levothyroxine 112 mcg daily.      12.) Right upper lobe lung mass; patient s/p radiation therapy with possible radiation pneumonitis:  - Patient had VATS biopsy in Feb-2022 that was non-diagnostic.  - To f/u as outpatient for further evaluation.     13.) New left lower lobe lung nodule:  - F/u imaging as outpatient.     14.) Thrombocytopenia:  - Platelet count was 14 on 06-Feb and was 111 on 12-Feb-2025.  - Monitoring labs as needed.  - Monitoring for bleeding.     15.) Rheumatoid arthritis with positive rheumatoid factor:  - Per chart review, patient had once been on methotrexate and prednisone but had stopped these medications a few years ago.  - Patient to f/u as outpatient if desired.     16.) Osteoporosis:  - Patient had been on prolia as outpatient. This is on hold for now.   - Further treatment as outpatient.     17.) History of immunodeficiency:   - IG subclass 2.  - Monitoring for evidence of new infection.      18.) Hypernatremia:  - This has been corrected for now.  - Monitoring labs as needed.     19.) Neuropathy; chronic pain syndrome:  - Patient on gabapentin 400 mg three times a day.  - Patient on roxanol 7.5 mg five times a day.  - Patient on methocarbamol 500 mg four times a day as needed.  - Patient had been on oxycodone as outpatient but this is on hold.       20.) Glaucoma; dry eyes:  - Patient not currently on medication for these problems.  - Patient to f/u with Ophthalmology as outpatient if further treatment is desired.     21.) Moderate malnutrition in the context of acute illness:  - Supplementing as able.     22.) Possible cognitive impairment:  - Further evaluation as outpatient.      23.) Possible esophagitis on CT scan:  - Patient to be on protonix 40 mg PO twice daily.   - Further evaluation as outpatient.           Diet: Regular Diet Adult Thin Liquids (level 0)  Snacks/Supplements Adult: Ensure Enlive; With Meals    Mir Catheter: Not present  Lines: None     Cardiac Monitoring: None  Code Status: No CPR- Do NOT Intubate      Clinically Significant Risk Factors                 # Thrombocytopenia: Lowest platelets = 128 in last 2 days, will monitor for bleeding   # Hypertension: Noted on problem list     # Acute Hypoxic Respiratory Failure: Documented O2 saturation < 90%. Continue supplemental oxygen as needed          # Moderate Malnutrition: based on nutrition assessment    # Financial/Environmental Concerns:           Social Drivers of Health    Tobacco Use: Medium Risk (10/18/2024)    Patient History     Smoking Tobacco Use: Former     Smokeless Tobacco Use: Never   Physical Activity: Inactive (10/14/2024)    Exercise Vital Sign     Days of Exercise per Week: 0 days     Minutes of Exercise per Session: 0 min   Stress: Stress Concern Present (10/14/2024)    [employer] - Occupational Stress Questionnaire     Feeling of Stress : Very much   Social Connections: Unknown (10/14/2024)    Social Connection and Isolation Panel [NHANES]     Frequency of Social Gatherings with Friends and Family: More than three times a week          Disposition Plan     Medically Ready for Discharge: Anticipated in 2-4 Days             Gonzalez Watt MD  Hospitalist Service, GOLD TEAM [address] [place] [address]  Center  Securely message with [employer] (more info)  Text page via [employer] Paging/Directory   See signed in provider for up to date coverage information  ______________________________________________________________________    Interval History   Patient indicated feeling somewhat worse today.     Physical Exam   Vital Signs: Temp: 97.7  F (36.5  C) Temp src: Oral BP: 113/65 Pulse: 72   Resp: 20 SpO2: 95 % O2 Device: Nasal cannula Oxygen Delivery: 1.5 LPM  Weight: 155 lbs 10.32 oz    General: Patient breathing rapidly. Patient pauses in the middle of sentences.   Lungs: Breath sounds present with some wheezing.

## 2025-02-15 NOTE — PLAN OF CARE
Goal Outcome Evaluation:      Plan of Care Reviewed With: patient    Overall Patient Progress: improving    VS: Temp: 97.7  F (36.5  C) Temp src: Oral BP: 117/65 Pulse: 70   Resp: 18 SpO2: 98 % O2 Device: Nasal cannula Oxygen Delivery: 1.5 LPM    O2: SpO2 > 98%  and stable on 1.5 LPM O2 via NC LS clear and equal bilaterally. Denies chest pain and SOB.    Output: Voids spontaneously without difficulty to bathroom.   Last BM: 1/14/2025, denies abdominal discomfort. BS active andpassing flatus.    Activity: Up with SBA .   Skin: WDL except, bruises and scabs to BUE;    Pain: Pain managed with scheduled medications and PRN Roxanol pt declined offered Robaxin.    CMS: Intact, AOx4. Baseline numbness and tingling to BLE   Dressing: BUE Dressing is CDI   Diet: Regular diet. Denies nausea/vomiting.    LDA: L PIV SL.   Equipment: IV pole, personal belongings, call light.   Plan: Possible discharge to LTC on 02/17/2025. Continue with plan of care. Call light within reach, pt able to make needs known.    Additional Info:

## 2025-02-15 NOTE — PLAN OF CARE
Goal Outcome Evaluation:      Plan of Care Reviewed With: patient    Overall Patient Progress: improvingOverall Patient Progress: improving       Patient alert and oriented x4. Continues to require 1L of oxygen. SOB with activity/talking. Continent of bowel and bladder, LBM 2/14/25. SBA x1 with walker. Possible to discharge to [facility] on Monday.

## 2025-02-16 PROCEDURE — 250N000013 HC RX MED GY IP 250 OP 250 PS 637

## 2025-02-16 PROCEDURE — 250N000013 HC RX MED GY IP 250 OP 250 PS 637: Performed by: INTERNAL MEDICINE

## 2025-02-16 PROCEDURE — 250N000013 HC RX MED GY IP 250 OP 250 PS 637: Performed by: STUDENT IN AN ORGANIZED HEALTH CARE EDUCATION/TRAINING PROGRAM

## 2025-02-16 PROCEDURE — 250N000012 HC RX MED GY IP 250 OP 636 PS 637: Performed by: INTERNAL MEDICINE

## 2025-02-16 PROCEDURE — 250N000009 HC RX 250

## 2025-02-16 PROCEDURE — 99232 SBSQ HOSP IP/OBS MODERATE 35: CPT | Performed by: INTERNAL MEDICINE

## 2025-02-16 PROCEDURE — 120N000002 HC R&B MED SURG/OB UMMC

## 2025-02-16 PROCEDURE — 94640 AIRWAY INHALATION TREATMENT: CPT

## 2025-02-16 PROCEDURE — 94640 AIRWAY INHALATION TREATMENT: CPT | Mod: 76

## 2025-02-16 PROCEDURE — 999N000157 HC STATISTIC RCP TIME EA 10 MIN

## 2025-02-16 RX ORDER — METHOCARBAMOL 750 MG/1
750 TABLET, FILM COATED ORAL 4 TIMES DAILY PRN
Status: DISCONTINUED | OUTPATIENT
Start: 2025-02-16 | End: 2025-02-18 | Stop reason: HOSPADM

## 2025-02-16 RX ADMIN — CARBIDOPA AND LEVODOPA 1 TABLET: 25; 100 TABLET ORAL at 14:17

## 2025-02-16 RX ADMIN — MORPHINE SULFATE 7.5 MG: 20 SOLUTION ORAL at 16:59

## 2025-02-16 RX ADMIN — MORPHINE SULFATE 7.5 MG: 20 SOLUTION ORAL at 22:17

## 2025-02-16 RX ADMIN — Medication 1 HALF-TAB: at 19:47

## 2025-02-16 RX ADMIN — MORPHINE SULFATE 7.5 MG: 20 SOLUTION ORAL at 07:55

## 2025-02-16 RX ADMIN — Medication 1 HALF-TAB: at 14:17

## 2025-02-16 RX ADMIN — IPRATROPIUM BROMIDE AND ALBUTEROL SULFATE 3 ML: .5; 3 SOLUTION RESPIRATORY (INHALATION) at 16:46

## 2025-02-16 RX ADMIN — ROSUVASTATIN 10 MG: 10 TABLET, FILM COATED ORAL at 07:57

## 2025-02-16 RX ADMIN — AMLODIPINE BESYLATE 5 MG: 5 TABLET ORAL at 22:17

## 2025-02-16 RX ADMIN — LEVOTHYROXINE SODIUM 112 MCG: 112 TABLET ORAL at 07:56

## 2025-02-16 RX ADMIN — SULFAMETHOXAZOLE AND TRIMETHOPRIM 1 TABLET: 400; 80 TABLET ORAL at 07:57

## 2025-02-16 RX ADMIN — CARBIDOPA AND LEVODOPA 1 TABLET: 25; 100 TABLET ORAL at 19:47

## 2025-02-16 RX ADMIN — Medication 1 TABLET: at 07:56

## 2025-02-16 RX ADMIN — Medication 600 MG: at 07:57

## 2025-02-16 RX ADMIN — PANTOPRAZOLE SODIUM 40 MG: 40 TABLET, DELAYED RELEASE ORAL at 17:00

## 2025-02-16 RX ADMIN — MORPHINE SULFATE 7.5 MG: 20 SOLUTION ORAL at 14:16

## 2025-02-16 RX ADMIN — ASPIRIN 81 MG: 81 TABLET, COATED ORAL at 19:46

## 2025-02-16 RX ADMIN — Medication 1 HALF-TAB: at 07:56

## 2025-02-16 RX ADMIN — PANTOPRAZOLE SODIUM 40 MG: 40 TABLET, DELAYED RELEASE ORAL at 07:57

## 2025-02-16 RX ADMIN — GABAPENTIN 400 MG: 300 CAPSULE ORAL at 14:17

## 2025-02-16 RX ADMIN — POTASSIUM CHLORIDE 20 MEQ: 1500 TABLET, EXTENDED RELEASE ORAL at 07:56

## 2025-02-16 RX ADMIN — GABAPENTIN 400 MG: 300 CAPSULE ORAL at 07:56

## 2025-02-16 RX ADMIN — GABAPENTIN 400 MG: 300 CAPSULE ORAL at 19:46

## 2025-02-16 RX ADMIN — POTASSIUM CHLORIDE 20 MEQ: 1500 TABLET, EXTENDED RELEASE ORAL at 19:46

## 2025-02-16 RX ADMIN — MORPHINE SULFATE 7.5 MG: 20 SOLUTION ORAL at 11:55

## 2025-02-16 RX ADMIN — FUROSEMIDE 20 MG: 20 TABLET ORAL at 17:00

## 2025-02-16 RX ADMIN — IPRATROPIUM BROMIDE AND ALBUTEROL SULFATE 3 ML: .5; 3 SOLUTION RESPIRATORY (INHALATION) at 20:29

## 2025-02-16 RX ADMIN — PREDNISONE 30 MG: 20 TABLET ORAL at 07:57

## 2025-02-16 RX ADMIN — IPRATROPIUM BROMIDE AND ALBUTEROL SULFATE 3 ML: .5; 3 SOLUTION RESPIRATORY (INHALATION) at 08:14

## 2025-02-16 RX ADMIN — GUAIFENESIN 600 MG: 600 TABLET ORAL at 19:47

## 2025-02-16 RX ADMIN — GUAIFENESIN 600 MG: 600 TABLET ORAL at 07:56

## 2025-02-16 RX ADMIN — Medication 25 MCG: at 07:56

## 2025-02-16 RX ADMIN — ASPIRIN 81 MG: 81 TABLET, COATED ORAL at 07:56

## 2025-02-16 RX ADMIN — METOPROLOL SUCCINATE 50 MG: 50 TABLET, EXTENDED RELEASE ORAL at 07:57

## 2025-02-16 RX ADMIN — FUROSEMIDE 20 MG: 20 TABLET ORAL at 07:56

## 2025-02-16 RX ADMIN — CARBIDOPA AND LEVODOPA 1 TABLET: 25; 100 TABLET ORAL at 07:56

## 2025-02-16 ASSESSMENT — ACTIVITIES OF DAILY LIVING (ADL)
ADLS_ACUITY_SCORE: 43

## 2025-02-16 NOTE — PROGRESS NOTES
"[facility]    Medicine Progress Note - Hospitalist Service, GOLD TEAM 18    Date of Admission:  1/4/2025    Assessment & Plan   A: Patient is a 78 y/o man who has a past medical history significant for thyroid cancer with lung metastases, Parkinson's disease, neuropathy, glaucoma, osteoporosis, chronic pain, severe COPD, hyperlipidemia, obstructive sleep apnea and coronary artery disease. Patient presented on 1/04/2025 with a 7 day history of productive cough and a 1 day history of weakness. CT pulmonary angiogram was negative for pulmonary embolism. Patient as admitted with  COPD exacerbation  and possible  community-acquired pneumonia as well.      Patient reportedly saw Pulmonology in Oct-2024 and COPD was moderately well-controlled at that time. Activity was apparently limited much more by his balance and parkinsonism than by respiratory status. Patient has antibiotics and prednisone available for outpatient COPD flares but apparently has not recently used this.      Patient apparently just entered hospice in the past week prior to hospital admission. Patient stated on admission that he was too weak and he and his daughter called their hospice agency and 911 was called and he was brought into the hospital. When provider asked on day of presentation whether patient wished for hospitalization and treatment of his potential infection, patient stated that he was seeking treatment and reported that his goal was to \"be able to do things for myself\". It was discussed that hospice typically focused on comfort rather than diagnostic testing and aggressive medical treatment. Patient acknowledged this but had seemed unsure of what his true desires for his care are moving forward.      Patient was on ceftriaxone from 04-Jan-2025 to 08-Jan-2025. Patient was azithromycin 500 mg daily from 05-Jan-2025 to 07-Jan-2025. Patient was on zosyn from 09-Jan-2025 to 14-Jan-2025. Patient is " now on azithromycin every other day. Patient did receive azithromycin 500 mg IV daily on 06-Feb-2025 and 07-Feb-2025.      Patient is currently on a prednisone taper. CT pulmonary angiogram on 12-Feb-2025 was negative for pulmonary embolism.     Patient's overall prognosis is poor. Patient has been  seen by Palliative Care. Patient is DNR/DNI.     P:  1.) Pneumonia, resolved:  - Patient had received 3 consecutive days of azithromycin 500 mg daily from 06-Feb-2025 to 08-Feb-2025 for coverage of possible atypical bacterial pneumonia.   - Patient had been treated for community-acquired pneumonia on admission.   - Patient subsequently had been treated for possible hospital-acquired pneumonia with zosyn.  - Monitoring for recurrence.     2.) Sepsis, resolved:  - Patient was septic on admission secondary to pneumonia. Sepsis has resolved.  - Monitoring for recurrence.     3.) COPD exacerbation; patient oxygen and steroid dependent at baseline:  - Patient has been on prednisone taper. Prednisone dose was reduced from 40 mg daily to 20 mg daily on 11-Feb-2025.  - Given recurrent symptoms, prednisone 40 mg daily was resumed. Patient now on prednisone 30 mg daily. Plan is for slower taper. Patient to be on bactrim for PJP prophylaxis.  - Patient to eventually be tapered down to home dose of prednisone 2 mg daily.     4.) Chronic hypoxemic respiratory failure:  - Patient on 1-2 litres per minute of supplemental oxygen at baseline.  - Per patient's daughter, patient may be placed on BiPAP if needed. Patient reportedly had been on BiPAP intermittently that had helped with his breathing.   - CT pulmonary angiogram was negative for pulmonary embolism.     5.) Concern for pulmonary hypertension:  - Further evaluation as outpatient.     6.) Coronary artery disease with past MI with past stent placement, reportedly around 1997:  - Patient asymptomatic.   - Patient on metoprolol succinate and crestor.  - Resuming aspirin.     7.)  Hypertension:  - Monitoring on metoprolol succinate and lasix.     8.) History of traumatic subdural hemorrhage in 2023:  - This has resolved.  - No active intervention indicated.     9.) Parkinson's disease, recent diagnosis:  - Patient on sinemet.  - Patient to f/u with Neurology as outpatient.     10.) Frequent falls:  - Patient has history of left femur fracture, inferior pubic rami fracture, rib fracture.  - Monitoring for safety.     11.) Metastatic thyroid cancer (multifocal papillary carcinoma):  - Patient is s/p thyroidectomy in 2021. Patient had received radioactive iodine in the past as well.  - Further surveillance as outpatient.  - Patient on levothyroxine 112 mcg daily.      12.) Right upper lobe lung mass; patient s/p radiation therapy with possible radiation pneumonitis:  - Patient had VATS biopsy in Feb-2022 that was non-diagnostic.  - To f/u as outpatient for further evaluation.     13.) New left lower lobe lung nodule:  - F/u imaging as outpatient.     14.) Thrombocytopenia:  - Platelet count was 14 on 06-Feb and was 111 on 12-Feb-2025.  - Monitoring labs as needed.  - Monitoring for bleeding.     15.) Rheumatoid arthritis with positive rheumatoid factor:  - Per chart review, patient had once been on methotrexate and prednisone but had stopped these medications a few years ago.  - Patient to f/u as outpatient if desired.     16.) Osteoporosis:  - Patient had been on prolia as outpatient. This is on hold for now.   - Further treatment as outpatient.     17.) History of immunodeficiency:   - IG subclass 2.  - Monitoring for evidence of new infection.      18.) Hypernatremia:  - This has been corrected for now.  - Monitoring labs as needed.     19.) Neuropathy; chronic pain syndrome:  - Patient on gabapentin 400 mg three times a day.  - Patient on roxanol 7.5 mg five times a day.  - Patient on methocarbamol 500 mg four times a day as needed.  - Patient had been on oxycodone as outpatient but this is  on hold.      20.) Glaucoma; dry eyes:  - Patient not currently on medication for these problems.  - Patient to f/u with Ophthalmology as outpatient if further treatment is desired.     21.) Moderate malnutrition in the context of acute illness:  - Supplementing as able.     22.) Possible cognitive impairment:  - Further evaluation as outpatient.      23.) Possible esophagitis on CT scan:  - Patient on protonix 40 mg PO twice daily.   - Further evaluation as outpatient.              Diet: Regular Diet Adult Thin Liquids (level 0)  Snacks/Supplements Adult: Ensure Enlive; With Meals    Mir Catheter: Not present  Lines: None     Cardiac Monitoring: None  Code Status: No CPR- Do NOT Intubate      Clinically Significant Risk Factors                   # Hypertension: Noted on problem list     # Acute Hypoxic Respiratory Failure: Documented O2 saturation < 90%. Continue supplemental oxygen as needed          # Moderate Malnutrition: based on nutrition assessment    # Financial/Environmental Concerns:           Social Drivers of Health    Tobacco Use: Medium Risk (10/18/2024)    Patient History     Smoking Tobacco Use: Former     Smokeless Tobacco Use: Never   Physical Activity: Inactive (10/14/2024)    Exercise Vital Sign     Days of Exercise per Week: 0 days     Minutes of Exercise per Session: 0 min   Stress: Stress Concern Present (10/14/2024)    [employer] - Occupational Stress Questionnaire     Feeling of Stress : Very much   Social Connections: Unknown (10/14/2024)    Social Connection and Isolation Panel [NHANES]     Frequency of Social Gatherings with Friends and Family: More than three times a week          Disposition Plan     Medically Ready for Discharge: Anticipated Tomorrow             Gonzalez Watt MD  Hospitalist Service, [address]  Securely message with [employer] (more info)  Text page via [employer] Paging/Directory   See  signed in provider for up to date coverage information  ______________________________________________________________________    Interval History   Patient noted feeling better today but noted having low back pain today.    Physical Exam   Vital Signs: Temp: 98.2  F (36.8  C) Temp src: Oral BP: 134/78 Pulse: 60   Resp: 18 SpO2: 98 % O2 Device: Nasal cannula Oxygen Delivery: 1.5 LPM  Weight: 154 lbs 12.21 oz    General: Patient NAD. Breathing relatively fast with pauses to catch breath while speaking.  Heart: RRR, S1 S2 w/o murmurs.  Lungs: Breath sounds diminished with some crackles in right lung fields.

## 2025-02-16 NOTE — PLAN OF CARE
"Goal Outcome Evaluation:      Plan of Care Reviewed With: patient    Overall Patient Progress: no changeOverall Patient Progress: no change         Patient alert and oriented x4. VSS. Infrequent productive cough noted. Patient with increase work of breathing with activities and talking. Continues to require oxygen via NC. Pain to lower back and \"lung pain\" managed with schedule morphine. Patient SBA x1 with walker, steady gait, refusing gait belt. Continent of bowel and bladder, LBM 2/14/25. Possible to discharge to [facility] on Monday.   "

## 2025-02-16 NOTE — PLAN OF CARE
Goal Outcome Evaluation:      Plan of Care Reviewed With: patient    Overall Patient Progress: improving       VS: Temp: 97.8  F (36.6  C) Temp src: Oral BP: 126/56 Pulse: 75   Resp: 18 SpO2: 95 % O2 Device: Nasal cannula Oxygen Delivery: 1.5 LPM    O2: SpO2 > 95%  and stable on 1.5 LPM O2 via NC; dyspnea and SOB with exertion. Denies chest pain.   Output: Voids spontaneously without difficulty to bathroom.   Last BM: 1/14/2025, denies abdominal discomfort. BS active and passing flatus.    Activity: Up with SBA .   Skin: WDL except, bruises and scabs to BUE;    Pain: Pain managed with scheduled medications.   CMS: Intact, AOx4. Baseline numbness and tingling to BLE   Dressing: BUE Dressing is CDI   Diet: Regular diet. Denies nausea/vomiting.    LDA: L PIV SL.   Equipment: IV pole, personal belongings, call light.   Plan: Possible discharge to LTC on 02/17/2025. Continue with plan of care. Call light within reach, pt able to make needs known.    Additional Info:

## 2025-02-17 LAB
ANION GAP SERPL CALCULATED.3IONS-SCNC: 12 MMOL/L (ref 7–15)
BUN SERPL-MCNC: 21.7 MG/DL (ref 8–23)
CALCIUM SERPL-MCNC: 8.6 MG/DL (ref 8.8–10.4)
CHLORIDE SERPL-SCNC: 105 MMOL/L (ref 98–107)
CREAT SERPL-MCNC: 0.97 MG/DL (ref 0.67–1.17)
EGFRCR SERPLBLD CKD-EPI 2021: 80 ML/MIN/1.73M2
GLUCOSE SERPL-MCNC: 70 MG/DL (ref 70–99)
HCO3 SERPL-SCNC: 27 MMOL/L (ref 22–29)
POTASSIUM SERPL-SCNC: 4.2 MMOL/L (ref 3.4–5.3)
SODIUM SERPL-SCNC: 144 MMOL/L (ref 135–145)

## 2025-02-17 PROCEDURE — 250N000013 HC RX MED GY IP 250 OP 250 PS 637: Performed by: INTERNAL MEDICINE

## 2025-02-17 PROCEDURE — 120N000002 HC R&B MED SURG/OB UMMC

## 2025-02-17 PROCEDURE — 11720 DEBRIDE NAIL 1-5: CPT | Performed by: ASSISTANT, PODIATRIC

## 2025-02-17 PROCEDURE — 250N000013 HC RX MED GY IP 250 OP 250 PS 637: Performed by: STUDENT IN AN ORGANIZED HEALTH CARE EDUCATION/TRAINING PROGRAM

## 2025-02-17 PROCEDURE — 99232 SBSQ HOSP IP/OBS MODERATE 35: CPT | Performed by: INTERNAL MEDICINE

## 2025-02-17 PROCEDURE — 94640 AIRWAY INHALATION TREATMENT: CPT

## 2025-02-17 PROCEDURE — 80048 BASIC METABOLIC PNL TOTAL CA: CPT | Performed by: INTERNAL MEDICINE

## 2025-02-17 PROCEDURE — 36415 COLL VENOUS BLD VENIPUNCTURE: CPT | Performed by: INTERNAL MEDICINE

## 2025-02-17 PROCEDURE — 250N000012 HC RX MED GY IP 250 OP 636 PS 637: Performed by: INTERNAL MEDICINE

## 2025-02-17 PROCEDURE — 250N000009 HC RX 250

## 2025-02-17 PROCEDURE — 999N000157 HC STATISTIC RCP TIME EA 10 MIN

## 2025-02-17 PROCEDURE — 94640 AIRWAY INHALATION TREATMENT: CPT | Mod: 76

## 2025-02-17 PROCEDURE — 250N000013 HC RX MED GY IP 250 OP 250 PS 637

## 2025-02-17 PROCEDURE — 82374 ASSAY BLOOD CARBON DIOXIDE: CPT | Performed by: INTERNAL MEDICINE

## 2025-02-17 PROCEDURE — 84132 ASSAY OF SERUM POTASSIUM: CPT | Performed by: INTERNAL MEDICINE

## 2025-02-17 RX ORDER — SULFAMETHOXAZOLE AND TRIMETHOPRIM 400; 80 MG/1; MG/1
1 TABLET ORAL DAILY
DISCHARGE
Start: 2025-02-18

## 2025-02-17 RX ORDER — PREDNISONE 5 MG/1
10 TABLET ORAL DAILY
Status: DISCONTINUED | OUTPATIENT
Start: 2025-03-01 | End: 2025-02-18 | Stop reason: HOSPADM

## 2025-02-17 RX ORDER — ACETAMINOPHEN 325 MG/1
650 TABLET ORAL EVERY 4 HOURS PRN
DISCHARGE
Start: 2025-02-17

## 2025-02-17 RX ORDER — PREDNISONE 20 MG/1
20 TABLET ORAL DAILY
DISCHARGE
Start: 2025-02-22 | End: 2025-03-01

## 2025-02-17 RX ORDER — PREDNISONE 1 MG/1
2 TABLET ORAL DAILY
DISCHARGE
Start: 2025-03-15

## 2025-02-17 RX ORDER — PREDNISONE 5 MG/1
5 TABLET ORAL DAILY
Status: DISCONTINUED | OUTPATIENT
Start: 2025-03-08 | End: 2025-02-18 | Stop reason: HOSPADM

## 2025-02-17 RX ORDER — GUAIFENESIN 600 MG/1
600 TABLET, EXTENDED RELEASE ORAL 2 TIMES DAILY
DISCHARGE
Start: 2025-02-17

## 2025-02-17 RX ORDER — LOPERAMIDE HYDROCHLORIDE 2 MG/1
2 CAPSULE ORAL 4 TIMES DAILY PRN
DISCHARGE
Start: 2025-02-17

## 2025-02-17 RX ORDER — POLYETHYLENE GLYCOL 3350 17 G/17G
17 POWDER, FOR SOLUTION ORAL DAILY
DISCHARGE
Start: 2025-02-17

## 2025-02-17 RX ORDER — IPRATROPIUM BROMIDE AND ALBUTEROL SULFATE 2.5; .5 MG/3ML; MG/3ML
3 SOLUTION RESPIRATORY (INHALATION) EVERY 4 HOURS PRN
DISCHARGE
Start: 2025-02-17

## 2025-02-17 RX ORDER — PREDNISONE 1 MG/1
2 TABLET ORAL DAILY
Status: DISCONTINUED | OUTPATIENT
Start: 2025-03-15 | End: 2025-02-18 | Stop reason: HOSPADM

## 2025-02-17 RX ORDER — PREDNISONE 10 MG/1
30 TABLET ORAL DAILY
DISCHARGE
Start: 2025-02-18 | End: 2025-02-22

## 2025-02-17 RX ORDER — ONDANSETRON 4 MG/1
4 TABLET, ORALLY DISINTEGRATING ORAL EVERY 6 HOURS PRN
DISCHARGE
Start: 2025-02-17

## 2025-02-17 RX ORDER — PREDNISONE 5 MG/1
5 TABLET ORAL DAILY
DISCHARGE
Start: 2025-03-08 | End: 2025-03-15

## 2025-02-17 RX ORDER — MORPHINE SULFATE 20 MG/ML
5-10 SOLUTION ORAL
Qty: 10 ML | Refills: 0 | Status: SHIPPED | OUTPATIENT
Start: 2025-02-17

## 2025-02-17 RX ORDER — PREDNISONE 10 MG/1
10 TABLET ORAL DAILY
DISCHARGE
Start: 2025-03-01 | End: 2025-03-08

## 2025-02-17 RX ORDER — METOPROLOL SUCCINATE 50 MG/1
50 TABLET, EXTENDED RELEASE ORAL DAILY
DISCHARGE
Start: 2025-02-18 | End: 2025-02-26

## 2025-02-17 RX ORDER — MORPHINE SULFATE 20 MG/ML
7.5 SOLUTION ORAL
Qty: 56.25 ML | Refills: 0 | Status: SHIPPED | OUTPATIENT
Start: 2025-02-17

## 2025-02-17 RX ORDER — LORAZEPAM 0.5 MG/1
0.25 TABLET ORAL EVERY 6 HOURS PRN
Qty: 10 TABLET | Refills: 0 | Status: SHIPPED | OUTPATIENT
Start: 2025-02-17

## 2025-02-17 RX ORDER — FUROSEMIDE 20 MG/1
20 TABLET ORAL
DISCHARGE
Start: 2025-02-17

## 2025-02-17 RX ORDER — PREDNISONE 20 MG/1
20 TABLET ORAL DAILY
Status: DISCONTINUED | OUTPATIENT
Start: 2025-02-22 | End: 2025-02-18 | Stop reason: HOSPADM

## 2025-02-17 RX ORDER — IPRATROPIUM BROMIDE AND ALBUTEROL SULFATE 2.5; .5 MG/3ML; MG/3ML
3 SOLUTION RESPIRATORY (INHALATION) 4 TIMES DAILY
DISCHARGE
Start: 2025-02-17

## 2025-02-17 RX ORDER — LIDOCAINE 4 G/G
1 PATCH TOPICAL EVERY 24 HOURS
DISCHARGE
Start: 2025-02-17

## 2025-02-17 RX ORDER — PANTOPRAZOLE SODIUM 40 MG/1
40 TABLET, DELAYED RELEASE ORAL
DISCHARGE
Start: 2025-02-17

## 2025-02-17 RX ORDER — AMOXICILLIN 250 MG
1 CAPSULE ORAL 2 TIMES DAILY PRN
DISCHARGE
Start: 2025-02-17

## 2025-02-17 RX ORDER — METHOCARBAMOL 750 MG/1
750 TABLET, FILM COATED ORAL 4 TIMES DAILY PRN
DISCHARGE
Start: 2025-02-17

## 2025-02-17 RX ADMIN — ASPIRIN 81 MG: 81 TABLET, COATED ORAL at 08:46

## 2025-02-17 RX ADMIN — POTASSIUM CHLORIDE 20 MEQ: 1500 TABLET, EXTENDED RELEASE ORAL at 08:46

## 2025-02-17 RX ADMIN — PREDNISONE 30 MG: 20 TABLET ORAL at 08:44

## 2025-02-17 RX ADMIN — Medication 1 HALF-TAB: at 08:46

## 2025-02-17 RX ADMIN — MORPHINE SULFATE 7.5 MG: 20 SOLUTION ORAL at 18:09

## 2025-02-17 RX ADMIN — Medication 1 HALF-TAB: at 21:09

## 2025-02-17 RX ADMIN — SULFAMETHOXAZOLE AND TRIMETHOPRIM 1 TABLET: 400; 80 TABLET ORAL at 08:45

## 2025-02-17 RX ADMIN — GUAIFENESIN 600 MG: 600 TABLET ORAL at 08:45

## 2025-02-17 RX ADMIN — GABAPENTIN 400 MG: 300 CAPSULE ORAL at 14:27

## 2025-02-17 RX ADMIN — POTASSIUM CHLORIDE 20 MEQ: 1500 TABLET, EXTENDED RELEASE ORAL at 21:09

## 2025-02-17 RX ADMIN — METHOCARBAMOL 750 MG: 750 TABLET ORAL at 08:56

## 2025-02-17 RX ADMIN — LEVOTHYROXINE SODIUM 112 MCG: 112 TABLET ORAL at 08:44

## 2025-02-17 RX ADMIN — PANTOPRAZOLE SODIUM 40 MG: 40 TABLET, DELAYED RELEASE ORAL at 08:46

## 2025-02-17 RX ADMIN — CARBIDOPA AND LEVODOPA 1 TABLET: 25; 100 TABLET ORAL at 14:27

## 2025-02-17 RX ADMIN — Medication 600 MG: at 08:45

## 2025-02-17 RX ADMIN — Medication 1 HALF-TAB: at 14:27

## 2025-02-17 RX ADMIN — Medication 1 TABLET: at 08:45

## 2025-02-17 RX ADMIN — FUROSEMIDE 20 MG: 20 TABLET ORAL at 17:27

## 2025-02-17 RX ADMIN — IPRATROPIUM BROMIDE AND ALBUTEROL SULFATE 3 ML: .5; 3 SOLUTION RESPIRATORY (INHALATION) at 08:36

## 2025-02-17 RX ADMIN — METOPROLOL SUCCINATE 50 MG: 50 TABLET, EXTENDED RELEASE ORAL at 08:44

## 2025-02-17 RX ADMIN — ACETAMINOPHEN 650 MG: 325 TABLET, FILM COATED ORAL at 08:56

## 2025-02-17 RX ADMIN — PANTOPRAZOLE SODIUM 40 MG: 40 TABLET, DELAYED RELEASE ORAL at 17:27

## 2025-02-17 RX ADMIN — MORPHINE SULFATE 7.5 MG: 20 SOLUTION ORAL at 11:56

## 2025-02-17 RX ADMIN — CARBIDOPA AND LEVODOPA 1 TABLET: 25; 100 TABLET ORAL at 21:09

## 2025-02-17 RX ADMIN — Medication 25 MCG: at 08:46

## 2025-02-17 RX ADMIN — ASPIRIN 81 MG: 81 TABLET, COATED ORAL at 21:09

## 2025-02-17 RX ADMIN — CARBIDOPA AND LEVODOPA 1 TABLET: 25; 100 TABLET ORAL at 08:46

## 2025-02-17 RX ADMIN — MORPHINE SULFATE 7.5 MG: 20 SOLUTION ORAL at 14:26

## 2025-02-17 RX ADMIN — GABAPENTIN 400 MG: 300 CAPSULE ORAL at 08:45

## 2025-02-17 RX ADMIN — GABAPENTIN 400 MG: 300 CAPSULE ORAL at 21:09

## 2025-02-17 RX ADMIN — ROSUVASTATIN 10 MG: 10 TABLET, FILM COATED ORAL at 08:44

## 2025-02-17 RX ADMIN — MORPHINE SULFATE 7.5 MG: 20 SOLUTION ORAL at 22:08

## 2025-02-17 RX ADMIN — AZITHROMYCIN DIHYDRATE 500 MG: 250 TABLET ORAL at 08:46

## 2025-02-17 RX ADMIN — GUAIFENESIN 600 MG: 600 TABLET ORAL at 21:09

## 2025-02-17 RX ADMIN — IPRATROPIUM BROMIDE AND ALBUTEROL SULFATE 3 ML: .5; 3 SOLUTION RESPIRATORY (INHALATION) at 16:40

## 2025-02-17 RX ADMIN — IPRATROPIUM BROMIDE AND ALBUTEROL SULFATE 3 ML: .5; 3 SOLUTION RESPIRATORY (INHALATION) at 12:25

## 2025-02-17 RX ADMIN — FUROSEMIDE 20 MG: 20 TABLET ORAL at 08:47

## 2025-02-17 RX ADMIN — IPRATROPIUM BROMIDE AND ALBUTEROL SULFATE 3 ML: .5; 3 SOLUTION RESPIRATORY (INHALATION) at 20:30

## 2025-02-17 RX ADMIN — MORPHINE SULFATE 7.5 MG: 20 SOLUTION ORAL at 08:43

## 2025-02-17 ASSESSMENT — ACTIVITIES OF DAILY LIVING (ADL)
ADLS_ACUITY_SCORE: 43
ADLS_ACUITY_SCORE: 43
ADLS_ACUITY_SCORE: 46
ADLS_ACUITY_SCORE: 43
ADLS_ACUITY_SCORE: 46
ADLS_ACUITY_SCORE: 46
ADLS_ACUITY_SCORE: 43
ADLS_ACUITY_SCORE: 43

## 2025-02-17 NOTE — PROGRESS NOTES
"[facility]    Medicine Progress Note - Hospitalist Service, GOLD TEAM 18    Date of Admission:  1/4/2025    Assessment & Plan   A: Patient is a 78 y/o man who has a past medical history significant for thyroid cancer with lung metastases, Parkinson's disease, neuropathy, glaucoma, osteoporosis, chronic pain, severe COPD, hyperlipidemia, obstructive sleep apnea and coronary artery disease. Patient presented on 1/04/2025 with a 7 day history of productive cough and a 1 day history of weakness. CT pulmonary angiogram was negative for pulmonary embolism. Patient as admitted with  COPD exacerbation  and possible  community-acquired pneumonia as well.      Patient reportedly saw Pulmonology in Oct-2024 and COPD was moderately well-controlled at that time. Activity was apparently limited much more by his balance and parkinsonism than by respiratory status. Patient has antibiotics and prednisone available for outpatient COPD flares but apparently has not recently used this.      Patient apparently just entered hospice in the past week prior to hospital admission. Patient stated on admission that he was too weak and he and his daughter called their hospice agency and 911 was called and he was brought into the hospital. When provider asked on day of presentation whether patient wished for hospitalization and treatment of his potential infection, patient stated that he was seeking treatment and reported that his goal was to \"be able to do things for myself\". It was discussed that hospice typically focused on comfort rather than diagnostic testing and aggressive medical treatment. Patient acknowledged this but had seemed unsure of what his true desires for his care are moving forward.      Patient was on ceftriaxone from 04-Jan-2025 to 08-Jan-2025. Patient was azithromycin 500 mg daily from 05-Jan-2025 to 07-Jan-2025. Patient was on zosyn from 09-Jan-2025 to 14-Jan-2025. Patient is " now on azithromycin every other day. Patient did receive azithromycin 500 mg IV daily on 06-Feb-2025 and 07-Feb-2025.      Patient is currently on a prednisone taper. CT pulmonary angiogram on 12-Feb-2025 was negative for pulmonary embolism.     Patient's overall prognosis is poor. Patient has been  seen by Palliative Care. Patient is DNR/DNI.     P:  1.) Pneumonia, resolved:  - Patient had received 3 consecutive days of azithromycin 500 mg daily from 06-Feb-2025 to 08-Feb-2025 for coverage of possible atypical bacterial pneumonia.   - Patient had been treated for community-acquired pneumonia on admission.   - Patient subsequently had been treated for possible hospital-acquired pneumonia with zosyn.  - Monitoring for recurrence.     2.) Sepsis, resolved:  - Patient was septic on admission secondary to pneumonia. Sepsis has resolved.  - Monitoring for recurrence.     3.) COPD exacerbation; patient oxygen and steroid dependent at baseline:  - Patient has been on prednisone taper. Prednisone dose was reduced from 40 mg daily to 20 mg daily on 11-Feb-2025.  - Given recurrent symptoms, prednisone 40 mg daily was resumed on 12-Feb-2025. Prednisone dose was lowered to 30 mg daily on 15-Feb-2025. Plan is for slower taper:  Prednisone 30 mg daily for 1 week  Prednisone 20 mg daily for 1 week  Prednisone 10 mg daily for 1 week  Prednisone 5 mg daily for 1 week  Patient then to resume prednisone 2 mg daily.  - Patient to be on bactrim for PJP prophylaxis while on high dose prednisone.     4.) Chronic hypoxemic respiratory failure:  - Patient on 1-2 litres per minute of supplemental oxygen at baseline.  - Per patient's daughter, patient may be placed on BiPAP if needed. Patient reportedly had been on BiPAP intermittently that had helped with his breathing.   - CT pulmonary angiogram was negative for pulmonary embolism.     5.) Concern for pulmonary hypertension:  - Further evaluation as outpatient.     6.) Coronary artery  disease with past MI with past stent placement, reportedly around 1997:  - Patient asymptomatic.   - Patient on metoprolol succinate and crestor.  - Resuming aspirin.     7.) Hypertension:  - Monitoring on metoprolol succinate and lasix.     8.) History of traumatic subdural hemorrhage in 2023:  - This has resolved.  - No active intervention indicated.     9.) Parkinson's disease, recent diagnosis:  - Patient on sinemet.  - Patient to f/u with Neurology as outpatient.     10.) Frequent falls:  - Patient has history of left femur fracture, inferior pubic rami fracture, rib fracture.  - Monitoring for safety.     11.) Metastatic thyroid cancer (multifocal papillary carcinoma):  - Patient is s/p thyroidectomy in 2021. Patient had received radioactive iodine in the past as well.  - Further surveillance as outpatient.  - Patient on levothyroxine 112 mcg daily.      12.) Right upper lobe lung mass; patient s/p radiation therapy with possible radiation pneumonitis:  - Patient had VATS biopsy in Feb-2022 that was non-diagnostic.  - To f/u as outpatient for further evaluation.     13.) New left lower lobe lung nodule:  - F/u imaging as outpatient.     14.) Thrombocytopenia:  - Platelet count was 14 on 06-Feb and was 111 on 12-Feb-2025.  - Monitoring labs as needed.  - Monitoring for bleeding.     15.) Rheumatoid arthritis with positive rheumatoid factor:  - Per chart review, patient had once been on methotrexate and prednisone but had stopped these medications a few years ago.  - Patient to f/u as outpatient if desired.     16.) Osteoporosis:  - Patient had been on prolia as outpatient. This is on hold for now.   - Further treatment as outpatient.     17.) History of immunodeficiency:   - IG subclass 2.  - Monitoring for evidence of new infection.      18.) Hypernatremia:  - This has been corrected for now.  - Monitoring labs as needed.     19.) Neuropathy; chronic pain syndrome:  - Patient on gabapentin 400 mg three times a  day.  - Patient on roxanol 7.5 mg five times a day.  - Patient on methocarbamol 750 mg four times a day as needed.  - Patient had been on oxycodone as outpatient but this has been discontinued.     20.) Glaucoma; dry eyes:  - Patient not currently on medication for these problems.  - Patient to f/u with Ophthalmology as outpatient if further treatment is desired.     21.) Moderate malnutrition in the context of acute illness:  - Supplementing as able.     22.) Possible cognitive impairment:  - Further evaluation as outpatient.      23.) Possible esophagitis on CT scan:  - Patient on protonix 40 mg PO twice daily.   - Further evaluation as outpatient.              Diet: Regular Diet Adult Thin Liquids (level 0)  Snacks/Supplements Adult: Ensure Enlive; With Meals    Mir Catheter: Not present  Lines: None     Cardiac Monitoring: None  Code Status: No CPR- Do NOT Intubate      Clinically Significant Risk Factors                   # Hypertension: Noted on problem list     # Acute Hypoxic Respiratory Failure: Documented O2 saturation < 90%. Continue supplemental oxygen as needed          # Moderate Malnutrition: based on nutrition assessment    # Financial/Environmental Concerns:           Social Drivers of Health    Tobacco Use: Medium Risk (10/18/2024)    Patient History     Smoking Tobacco Use: Former     Smokeless Tobacco Use: Never   Physical Activity: Inactive (10/14/2024)    Exercise Vital Sign     Days of Exercise per Week: 0 days     Minutes of Exercise per Session: 0 min   Stress: Stress Concern Present (10/14/2024)    [employer] - Occupational Stress Questionnaire     Feeling of Stress : Very much   Social Connections: Unknown (10/14/2024)    Social Connection and Isolation Panel [NHANES]     Frequency of Social Gatherings with Friends and Family: More than three times a week          Disposition Plan     Medically Ready for Discharge: Anticipated Tomorrow             Gonzalez  MD Shukri  Hospitalist Service, GOLD TEAM 18  [facility]  Securely message with [employer] (more info)  Text page via [employer] Paging/Directory   See signed in provider for up to date coverage information  ______________________________________________________________________    Interval History   Patient indicated no new problems.    Physical Exam   Vital Signs: Temp: 98.4  F (36.9  C) Temp src: Oral BP: 121/62 Pulse: 53   Resp: 17 SpO2: 100 % (receiving breathing treatment) O2 Device: Nasal cannula Oxygen Delivery: 1.5 LPM  Weight: 154 lbs 12.21 oz    General: Patient breathing quickly. Patient pauses to breath while speaking at times.     Labs noted.  Sodium 144; Potassium 4.2; Creatinine 0.97;

## 2025-02-17 NOTE — PLAN OF CARE
"Goal Outcome Evaluation:      Plan of Care Reviewed With: patient    Overall Patient Progress: no changeOverall Patient Progress: no change       Patient alert and oriented x4. VSS. Infrequent productive cough noted. Patient with increase work of breathing with activities and talking. Continues to require oxygen via NC. Pain to lower back and \"lung pain\" managed with schedule morphine. Patient SBA x1 with walker, steady gait, refusing gait belt. Continent of bowel and bladder, LBM 2/14/25. Possible discharge to [facility] on Monday.     "

## 2025-02-17 NOTE — PLAN OF CARE
Goal Outcome Evaluation:      Plan of Care Reviewed With: patient    Overall Patient Progress: no change       VS: Temp: 98.4  F (36.9  C) Temp src: Oral BP: 104/72 Pulse: 72   Resp: 18 SpO2: 96 % O2 Device: Nasal cannula Oxygen Delivery: 1.5 LPM    O2: SpO2 > 95%  and stable on 1.5 LPM O2 via NC; dyspnea and SOB in exertion. Denies chest pain.   Output: Voids spontaneously without difficulty to bathroom.   Last BM: 1/14/2025, denies abdominal discomfort. BS active and passing flatus.    Activity: Up with SBA .   Skin: WDL except, bruises and scabs to BUE;    Pain: Pain managed with scheduled medications.   CMS: Intact, AOx4. Baseline numbness and tingling to BLE   Dressing: BUE Dressing is CDI   Diet: Regular diet. Denies nausea/vomiting.    LDA: L PIV SL.   Equipment: IV pole, personal belongings, call light.   Plan: Possible discharge to [facility] on Monday 02/17/2025. Continue with plan of care. Call light within reach, pt able to make needs known.    Additional Info:

## 2025-02-17 NOTE — PROGRESS NOTES
Care Management Discharge Note    Discharge Date: 02/18/24       Discharge Disposition: Long Term Care-[facility]-Nurse to Nurse Report call  Norah [phone]    Discharge Services: Other (see comment) (current on home hospice)    Discharge DME: None    Discharge Transportation: [facility] Stretcher ride [id number] AM Ride  Window.     Private pay costs discussed: transportation costs SW identified that there is always the possibility of a bill from transport.     Does the patient's insurance plan have a 3 day qualifying hospital stay waiver?  Yes     Which insurance plan 3 day waiver is available? Alternative insurance waiver    Will the waiver be used for post-acute placement? Yes    PAS Confirmation Code:  [id number]  Patient/family educated on Medicare website which has current facility and service quality ratings:  Yes    Education Provided on the Discharge Plan: Yes  Persons Notified of Discharge Plans: Pt, facility, Pt's daughter, charge nurse and Attending.  Patient/Family in Agreement with the Plan:  Yes    Handoff Referral Completed: No, handoff not indicated or clinically appropriate    Additional Information:  SW met with Pt at bedside to identify the accepting facility.  SW  updated Pt's daughter of accepting facility. SW and attending spoke with Pt and Pt expressed understanding of the discharge plan. SW sent a referral to [facility] the in-house hospice agency at [facility] and set up a stretcher ride. SW spoke with Pt's daughter and identified that provider was advocating for stretcher ride. SW identified he will fill out transport  form to indicate Pt is discharging to LTC with hospice and cannot guarantee that there will  not be a bill. Pt's daughter indicated understanding.     Antonio Houston, [facility]  [address] & [place] [facility]   Ph: [phone]
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
## 2025-02-18 ENCOUNTER — DOCUMENTATION ONLY (OUTPATIENT)
Dept: OTHER | Facility: CLINIC | Age: 80
End: 2025-02-18
Payer: MEDICARE

## 2025-02-18 VITALS
TEMPERATURE: 97.5 F | BODY MASS INDEX: 23.19 KG/M2 | SYSTOLIC BLOOD PRESSURE: 133 MMHG | WEIGHT: 154.76 LBS | HEART RATE: 67 BPM | OXYGEN SATURATION: 98 % | DIASTOLIC BLOOD PRESSURE: 65 MMHG | RESPIRATION RATE: 18 BRPM

## 2025-02-18 PROCEDURE — 250N000013 HC RX MED GY IP 250 OP 250 PS 637: Performed by: INTERNAL MEDICINE

## 2025-02-18 PROCEDURE — 250N000013 HC RX MED GY IP 250 OP 250 PS 637: Performed by: STUDENT IN AN ORGANIZED HEALTH CARE EDUCATION/TRAINING PROGRAM

## 2025-02-18 PROCEDURE — 999N000157 HC STATISTIC RCP TIME EA 10 MIN

## 2025-02-18 PROCEDURE — 250N000012 HC RX MED GY IP 250 OP 636 PS 637: Performed by: INTERNAL MEDICINE

## 2025-02-18 PROCEDURE — 94640 AIRWAY INHALATION TREATMENT: CPT

## 2025-02-18 PROCEDURE — 250N000009 HC RX 250

## 2025-02-18 PROCEDURE — 250N000013 HC RX MED GY IP 250 OP 250 PS 637

## 2025-02-18 PROCEDURE — 99239 HOSP IP/OBS DSCHRG MGMT >30: CPT | Performed by: HOSPITALIST

## 2025-02-18 RX ADMIN — MORPHINE SULFATE 7.5 MG: 20 SOLUTION ORAL at 07:52

## 2025-02-18 RX ADMIN — CARBIDOPA AND LEVODOPA 1 TABLET: 25; 100 TABLET ORAL at 07:53

## 2025-02-18 RX ADMIN — Medication 600 MG: at 07:53

## 2025-02-18 RX ADMIN — PANTOPRAZOLE SODIUM 40 MG: 40 TABLET, DELAYED RELEASE ORAL at 07:53

## 2025-02-18 RX ADMIN — POTASSIUM CHLORIDE 20 MEQ: 1500 TABLET, EXTENDED RELEASE ORAL at 07:53

## 2025-02-18 RX ADMIN — ASPIRIN 81 MG: 81 TABLET, COATED ORAL at 07:53

## 2025-02-18 RX ADMIN — LEVOTHYROXINE SODIUM 112 MCG: 112 TABLET ORAL at 07:53

## 2025-02-18 RX ADMIN — PREDNISONE 30 MG: 20 TABLET ORAL at 07:53

## 2025-02-18 RX ADMIN — GABAPENTIN 400 MG: 300 CAPSULE ORAL at 07:53

## 2025-02-18 RX ADMIN — ROSUVASTATIN 10 MG: 10 TABLET, FILM COATED ORAL at 07:54

## 2025-02-18 RX ADMIN — SULFAMETHOXAZOLE AND TRIMETHOPRIM 1 TABLET: 400; 80 TABLET ORAL at 07:53

## 2025-02-18 RX ADMIN — FUROSEMIDE 20 MG: 20 TABLET ORAL at 07:53

## 2025-02-18 RX ADMIN — Medication 1 TABLET: at 07:53

## 2025-02-18 RX ADMIN — GUAIFENESIN 600 MG: 600 TABLET ORAL at 07:53

## 2025-02-18 RX ADMIN — METOPROLOL SUCCINATE 50 MG: 50 TABLET, EXTENDED RELEASE ORAL at 07:53

## 2025-02-18 RX ADMIN — MORPHINE SULFATE 7.5 MG: 20 SOLUTION ORAL at 10:36

## 2025-02-18 RX ADMIN — Medication 1 HALF-TAB: at 07:53

## 2025-02-18 RX ADMIN — IPRATROPIUM BROMIDE AND ALBUTEROL SULFATE 3 ML: .5; 3 SOLUTION RESPIRATORY (INHALATION) at 09:14

## 2025-02-18 RX ADMIN — Medication 25 MCG: at 07:54

## 2025-02-18 ASSESSMENT — ACTIVITIES OF DAILY LIVING (ADL)
ADLS_ACUITY_SCORE: 46

## 2025-02-18 NOTE — PLAN OF CARE
Goal Outcome Evaluation:      Plan of Care Reviewed With: patient, child    Overall Patient Progress: improvingOverall Patient Progress: improving       VS: /65 (BP Location: Right arm)   Pulse 67   Temp 97.5  F (36.4  C) (Oral)   Resp 18   Wt 70.2 kg (154 lb 12.2 oz)   SpO2 98%   BMI 23.19 kg/m     O2: >90% on 1.5L of oxygen via NC. Infrequent productive cough and SOB with activities including while talking.    Output: Voiding spontaneously    Last BM: 2/17/25   Activity: WBAT. SBA x1 with walker, refuse gait belt. Gait steady    Skin: bruises and scabs to BUE;    Pain: Manage with schedule medication    CMS: Alert and oriented x4. Numbness and tingling to BLE    Dressing: CDI    Diet: Regular diet    LDA: PIV saline locked    Equipment: Walker, personal belongings, call light within patient reach    Plan: To discharge to [facility]    Additional Info:         DISCHARGE SUMMARY    Pt discharging to: [facility]   Transportation:  [facility] Stretcher ride   AVS given and discussed: EMS was given AVS and narcotics scripts. Nurse to Nurse report given to Norah (YANE)  [phone].   Medications given: no, discussed. No further questions. To be filled at [facility].   Belongings returned: Yes, ensured all belongings packed and sent with pt. No items in security.   Comments: Escorted safely to elevators. Pt left at 1205.

## 2025-02-18 NOTE — PLAN OF CARE
Goal Outcome Evaluation:      Plan of Care Reviewed With: patient    Overall Patient Progress: no changeOverall Patient Progress: no change       Outcome Evaluation: Patient A&Ox4. VSS. On 2L O2 nasal cannula. Has SOB with movement and exertion. Patient goes to the bathroom to void. Does not like to use urinal at night. LBM 2/17/25.  Has Bruising and old skin tear to BUE. Decline bed alarm but calls every time he wants to get out of bed. Steady on feet. Up with SBA using walker. Patient complain of lower back pain managed with repositioning. Plan for discharge today to [facility] stretcher ride set up between 1040-1120am. Patient able to make needs known. Will continue plan of care

## 2025-02-18 NOTE — DISCHARGE SUMMARY
[facility]  Hospitalist Discharge Summary      Date of Admission:  1/4/2025  Date of Discharge:  2/18/2025 12:14 PM  Discharging Provider: Mady Sheridan MD  Discharge Service: Hospitalist Service, GOLD TEAM 18    Discharge Diagnoses   Pneumonia   Sepsis   COPD exacerbation   Chronic hypoxemic respiratory failure       Clinically Significant Risk Factors     # Moderate Malnutrition: based on nutrition assessment      Follow-ups Needed After Discharge   Follow-up Appointments       Hospital Follow-up with Existing Primary Care Provider (PCP)      Please see details below         Schedule Primary Care visit within: 30 Days           Additional follow-up instructions/to-do's for PCP    :    Unresulted Labs Ordered in the Past 30 Days of this Admission       No orders found from 12/5/2024 to 1/5/2025.        These results will be followed up by Facility MD and PCP    Discharge Disposition   Discharged to LTC  Condition at discharge: Stable    Hospital Course   A: Patient is a 80 y/o man who has a past medical history significant for thyroid cancer with lung metastases, Parkinson's disease, neuropathy, glaucoma, osteoporosis, chronic pain, severe COPD, hyperlipidemia, obstructive sleep apnea and coronary artery disease. Patient presented on 1/04/2025 with a 7 day history of productive cough and a 1 day history of weakness. CT pulmonary angiogram was negative for pulmonary embolism. Patient as admitted with  COPD exacerbation  and possible  community-acquired pneumonia as well.      Patient reportedly saw Pulmonology in Oct-2024 and COPD was moderately well-controlled at that time. Activity was apparently limited much more by his balance and parkinsonism than by respiratory status. Patient has antibiotics and prednisone available for outpatient COPD flares but apparently has not recently used this.      Patient apparently just entered hospice in the past week prior to  "hospital admission. Patient stated on admission that he was too weak and he and his daughter called their hospice agency and 911 was called and he was brought into the hospital. When provider asked on day of presentation whether patient wished for hospitalization and treatment of his potential infection, patient stated that he was seeking treatment and reported that his goal was to \"be able to do things for myself\". It was discussed that hospice typically focused on comfort rather than diagnostic testing and aggressive medical treatment. Patient acknowledged this but had seemed unsure of what his true desires for his care are moving forward.      Patient was on ceftriaxone from 04-Jan-2025 to 08-Jan-2025. Patient was azithromycin 500 mg daily from 05-Jan-2025 to 07-Jan-2025. Patient was on zosyn from 09-Jan-2025 to 14-Jan-2025. Patient is now on azithromycin every other day. Patient did receive azithromycin 500 mg IV daily on 06-Feb-2025 and 07-Feb-2025.      Patient is currently on a prednisone taper. CT pulmonary angiogram on 12-Feb-2025 was negative for pulmonary embolism.     Patient's overall prognosis is poor. Patient has been  seen by Palliative Care. Patient is DNR/DNI.     P:  1.) Pneumonia, resolved:  - Patient had received 3 consecutive days of azithromycin 500 mg daily from 06-Feb-2025 to 08-Feb-2025 for coverage of possible atypical bacterial pneumonia.   - Patient had been treated for community-acquired pneumonia on admission.   - Patient subsequently had been treated for possible hospital-acquired pneumonia with zosyn.  - Monitoring for recurrence.     2.) Sepsis, resolved:  - Patient was septic on admission secondary to pneumonia. Sepsis has resolved.  - Monitoring for recurrence.     3.) COPD exacerbation; patient oxygen and steroid dependent at baseline:  - Patient has been on prednisone taper. Prednisone dose was reduced from 40 mg daily to 20 mg daily on 11-Feb-2025.  - Given recurrent symptoms, " prednisone 40 mg daily was resumed on 12-Feb-2025. Prednisone dose was lowered to 30 mg daily on 15-Feb-2025. Plan is for slower taper:  Prednisone 30 mg daily for 1 week  Prednisone 20 mg daily for 1 week  Prednisone 10 mg daily for 1 week  Prednisone 5 mg daily for 1 week  Patient then to resume prednisone 2 mg daily.  - Patient to be on bactrim for PJP prophylaxis while on high dose prednisone.     4.) Chronic hypoxemic respiratory failure:  - Patient on 1-2 litres per minute of supplemental oxygen at baseline.  - Per patient's daughter, patient may be placed on BiPAP if needed. Patient reportedly had been on BiPAP intermittently that had helped with his breathing.   - CT pulmonary angiogram was negative for pulmonary embolism.     5.) Concern for pulmonary hypertension:  - Further evaluation as outpatient.     6.) Coronary artery disease with past MI with past stent placement, reportedly around 1997:  - Patient asymptomatic.   - Patient on metoprolol succinate and crestor.  - Resuming aspirin.     7.) Hypertension:  - Monitoring on metoprolol succinate and lasix.     8.) History of traumatic subdural hemorrhage in 2023:  - This has resolved.  - No active intervention indicated.     9.) Parkinson's disease, recent diagnosis:  - Patient on sinemet.  - Patient to f/u with Neurology as outpatient.     10.) Frequent falls:  - Patient has history of left femur fracture, inferior pubic rami fracture, rib fracture.  - Monitoring for safety.     11.) Metastatic thyroid cancer (multifocal papillary carcinoma):  - Patient is s/p thyroidectomy in 2021. Patient had received radioactive iodine in the past as well.  - Further surveillance as outpatient.  - Patient on levothyroxine 112 mcg daily.      12.) Right upper lobe lung mass; patient s/p radiation therapy with possible radiation pneumonitis:  - Patient had VATS biopsy in Feb-2022 that was non-diagnostic.  - To f/u as outpatient for further evaluation.     13.) New left  lower lobe lung nodule:  - F/u imaging as outpatient.     14.) Thrombocytopenia:  - Platelet count was 14 on 06-Feb and was 111 on 12-Feb-2025.  - Monitoring labs as needed.  - Monitoring for bleeding.     15.) Rheumatoid arthritis with positive rheumatoid factor:  - Per chart review, patient had once been on methotrexate and prednisone but had stopped these medications a few years ago.  - Patient to f/u as outpatient if desired.     16.) Osteoporosis:  - Patient had been on prolia as outpatient. This is on hold for now.   - Further treatment as outpatient.     17.) History of immunodeficiency:   - IG subclass 2.  - Monitoring for evidence of new infection.      18.) Hypernatremia:  - This has been corrected for now.  - Monitoring labs as needed.     19.) Neuropathy; chronic pain syndrome:  - Patient on gabapentin 400 mg three times a day.  - Patient on roxanol 7.5 mg five times a day.  - Patient on methocarbamol 750 mg four times a day as needed.  - Patient had been on oxycodone as outpatient but this has been discontinued.     20.) Glaucoma; dry eyes:  - Patient not currently on medication for these problems.  - Patient to f/u with Ophthalmology as outpatient if further treatment is desired.     21.) Moderate malnutrition in the context of acute illness:  - Supplementing as able.     22.) Possible cognitive impairment:  - Further evaluation as outpatient.      23.) Possible esophagitis on CT scan:  - Patient on protonix 40 mg PO twice daily.   - Further evaluation as outpatient.        Consultations This Hospital Stay   CARE MANAGEMENT / SOCIAL WORK IP CONSULT  PALLIATIVE CARE ADULT IP CONSULT  CARE MANAGEMENT / SOCIAL WORK IP CONSULT  SPEECH LANGUAGE PATH ADULT IP CONSULT  NURSING TO CONSULT FOR VASCULAR ACCESS CARE IP CONSULT  PALLIATIVE CARE ADULT IP CONSULT  PALLIATIVE CARE ADULT IP CONSULT  PHARMACY LIAISON FOR MEDICATION COVERAGE CONSULT  PULMONARY GENERAL ADULT IP CONSULT  PALLIATIVE CARE ADULT IP  CONSULT  SPIRITUAL HEALTH SERVICES IP CONSULT  PODIATRY IP CONSULT    Code Status   No CPR- Do NOT Intubate    Time Spent on this Encounter   I, Mady Sheridan MD, personally saw the patient today and spent greater than 30 minutes discharging this patient.       Mady Sheridan MD  [facility] MED SURG ORTHOPEDIC  [address] [facility] [address]  Phone: [phone]  Fax: [phone]  ______________________________________________________________________    Physical Exam   Vital Signs: Temp: 97.5  F (36.4  C) Temp src: Oral BP: 133/65 Pulse: 67   Resp: 18 SpO2: 98 % O2 Device: Nasal cannula Oxygen Delivery: 1.5 LPM  Weight: 154 lbs 12.21 oz       Primary Care Physician   Yaneli Dozier    Discharge Orders      Primary Care - Care Coordination Referral      Adult Palliative Care  Referral      Reason for your hospital stay    80 y/o man who has a past medical history significant for thyroid cancer with lung metastases, Parkinson's disease, neuropathy, glaucoma, osteoporosis, chronic pain, severe COPD, hyperlipidemia, obstructive sleep apnea and coronary artery disease. Patient presented on 1/04/2025 with a 7 day history of productive cough and a 1 day history of weakness. CT pulmonary angiogram was negative for pulmonary embolism. Patient as admitted with  COPD exacerbation  and possible  community-acquired pneumonia as well.     Activity    Your activity upon discharge: activity as tolerated     Oxygen Adult/Peds    Oxygen Documentation  I certify that this patient, Harrison Thomas has been under my care (or a nurse practitioner or physican's assistant working with me). This is the face-to-face encounter for oxygen medical necessity.      At the time of this encounter, I have reviewed the qualifying testing and have determined that supplemental oxygen is reasonable and necessary and is expected to improve the patient's condition in a home setting.         Patient has  continued oxygen desaturation due to COPD J44.9  Pneumonia J18.9.    If portability is ordered, is the patient mobile within the home? yes    Was this visit performed as a telehealth visit: No     Diet    Follow this diet upon discharge: Current Diet:Orders Placed This Encounter      Snacks/Supplements Adult: Ensure Enlive; With Meals      Regular Diet Adult Thin Liquids (level 0)     Hospital Follow-up with Existing Primary Care Provider (PCP)    Please see details below            Significant Results and Procedures   Most Recent 3 CBC's:  Recent Labs   Lab Test 02/14/25  0635 02/11/25  0902 02/06/25  1109   WBC 13.6* 12.1* 7.3   HGB 12.1* 12.1* 11.7*   MCV 97 96 95   * 111* 14*     Most Recent 3 BMP's:  Recent Labs   Lab Test 02/17/25  0526 02/14/25  0635 02/12/25  0534    145 144   POTASSIUM 4.2 4.2 3.8   CHLORIDE 105 105 105   CO2 27 31* 31*   BUN 21.7 22.2 18.9   CR 0.97 0.85 0.87   ANIONGAP 12 9 8   KARLIE 8.6* 8.8 8.9   GLC 70 107* 90     Most Recent 2 LFT's:  Recent Labs   Lab Test 01/04/25  1227 04/22/24  1151   AST 18 19   ALT 6 18   ALKPHOS 89 83   BILITOTAL 0.5 0.6     Most Recent 3 INR's:  Recent Labs   Lab Test 06/13/23  0654 04/21/23  1043 02/11/22  0749   INR 1.09 0.94 1.03   ,   Results for orders placed or performed during the hospital encounter of 01/04/25   Chest XR,  PA & LAT    Narrative    EXAM: XR CHEST 2 VIEWS  1/4/2025 12:42 PM     HISTORY:  cough, fatigue       COMPARISON:  Chest x-ray 9/20/2024    FINDINGS:     Upright PA and lateral chest radiograph.    Trachea is midline. Cardiomediastinal silhouette and pulmonary  vasculature are within normal limits. Atherosclerotic calcifications  of the aorta. Unchanged linear opacities in the right upper lung with  right hemidiaphragm elevation similar to prior. Faint linear opacities  in the left lower lung peripherally. No pleural effusion or  pneumothorax.    Multilevel chronic compression deformities in the thoracic spine. No  acute  osseous abnormality. Visualized upper abdomen is unremarkable.        Impression    IMPRESSION:  1. Unchanged right upper lobe lung fibrosis with resultant volume loss  and hemidiaphragm elevation.  2. No focal airspace opacities.    I have personally reviewed the examination and initial interpretation  and I agree with the findings.    JUAN RUELAS MD         SYSTEM ID:  [id number]   CT Chest Pulmonary Embolism w Contrast    Narrative    EXAMINATION: CTA pulmonary angiogram, 1/4/2025 7:48 PM     COMPARISON: CT 7/10/2024, 4/12/2024    HISTORY: Dyspnea    TECHNIQUE: Volumetric helical acquisition of CT images of the chest  from the lung apices to the kidneys were acquired after the  administration of 80 mL of Isovue-370 IV contrast.  Post-processed  multiplanar and/or MIP reformations were obtained, archived to PACS  and used in interpretation of this study.     FINDINGS:      Contrast bolus is: adequate.  Exam is negative for acute pulmonary  embolism.       The largest right ventricle transaxial diameter is (measured from  endocardium to endocardium): 4.0 cm   The largest left ventricle transaxial diameter is (measured from  endocardium to endocardium): 3.9 cm  RV/LV ratio is: less than 1.1 (if ratio greater than 1.1 then sign is  suspicious for right heart strain)  Reflux of contrast into the IVC? no  Paradoxical bowing of the interventricular septum to the left? no  Pericardial effusion?: not present    Remainder of exam:    Devices: None.    Lower neck and axillae: No enlarged supraclavicular nodes are present.  Thyroidectomy. No axillary lymphadenopathy.    Heart: Normal heart size. Severe coronary artery atherosclerotic  disease. No pericardial fluid or thickening.    Mediastinum and kris: No mediastinal mass is present. No enlarged  lymph nodes are present.    Vessels: Normal caliber of the aorta and main pulmonary artery. No  significant atherosclerotic disease.    Airways: The central tracheobronchial  tree is clear. Frothy debris in  the right mainstem bronchus.    Lungs: New 8 mm nodular opacity in the left lower lobe (series 6,  image 213). Several small nodules appear to have resolved since the  7/10/2024 CT. For example, prior 4 mm nodule in the right lower lobe  (previous series 4, image 152). Several additional sub-6 mm pulmonary  nodules appear stable from prior including:  -4 mm solid nodule in the left lower lobe (series 6, image 241)  -3 mm solid nodule in the right upper lobe (series 6, image 86)    Moderate to severe centrilobular and paraseptal emphysema. Chronic  fibrotic/atelectatic changes along the right minor fissure with  associated traction bronchiectatic changes. Additional scattered  reticular fibrotic changes otherwise appear similar to prior. No  pleural effusion or pneumothorax    Upper abdomen: No acute findings. Postsurgical changes along the right  hepatic lobe.    Bones: No acute or aggressive osseous abnormality. Unchanged superior  endplate compression deformity at T8.    Soft tissues: Unremarkable.      Impression    IMPRESSION:   1. Exam is negative for acute pulmonary embolism.   2. Waxing and waning pulmonary nodularity with a new 8 mm nodular  focus in the left lower lobe. Findings are likely secondary to a  chronic infectious/inflammatory process including aspiration. Consider  short-term follow-up CT in one month for reassessment.  3. Additional sub-6 mm pulmonary nodules are stable compared to prior.  4. Moderate to severe emphysematous changes of the lung parenchyma  with chronic fibrotic changes along the right minor fissure.      In the event of a positive result for acute pulmonary embolism  resulting in right heart strain, consider calling the   [facility] hospital  for PERT (Pulmonary Embolism Response Team)  Activation?    PERT -- Pulmonary Embolism Response Team (Multidisciplinary team  including cardiology, interventional radiology, critical  care,  hematology)    I have personally reviewed the examination and initial interpretation  and I agree with the findings.    TERESITA BUSTAMANTE MD         SYSTEM ID:  [id number]   XR Chest 2 Views    Narrative    Exam: XR CHEST 2 VIEWS, 1/9/2025 10:44 AM    Comparison: CT chest from 1/4/2025    History: SOB    Findings:  Portable PA and lateral views of the chest. Trachea is midline.  Cardiomediastinal silhouette is stable. Flattening of the diaphragm  indicating emphysematous lungs. Elevation of the right hemidiaphragm.  Slight decrease of right perihilar opacity . Increased left lower lobe  pulmonary opacities. Similar linear density of the right upper lung  field. No focal airspace opacity. No pneumothorax or pleural effusion.  The visualized upper abdomen is unremarkable. Chronic compression  deformities of the thoracolumbar spine.      Impression    Impression:   1. Decrease of right perihilar opacity. Increased left lower lobe  pulmonary opacities.  2. Unchanged linear density of the right upper lobe, likely relating  to fibrosis versus atelectasis.    I have personally reviewed the examination and initial interpretation  and I agree with the findings.    SILVANO GIANG MD         SYSTEM ID:  [id number]   XR Video Swallow with SLP or OT    Narrative    Examination:  Modified Barium Swallow Study with Speech Pathology,  1/10/2025    Comparison: 1/4/2025 CT, 1/9/2025 radiograph.    History: Concern for aspiration, infiltrates on x-ray    Total fluoroscopy time: 1.31836 minutes.     Findings: Under fluoroscopic guidance, the patient was given orally  administered barium of varying consistencies in the presence of the  speech pathology service.     The oral phase was within normal limits. Delayed triggering of  swallow, mild residue/ pooling of contrast within vallecula and  piriform sinuses.  On first trial of examination within liquid barium, there was valorie  silent aspiration. A later trial of thin liquid barium  with scant  volume deep penetration to the cords in a ventral silent aspiration.  Remainder of thin liquid barium trials or either normal or  demonstrated very shallow transient penetration. No significant  penetration or aspiration noted with chin tuck maneuver, however  patient had difficulty with this.     Normal trials with pudding and cracker consistencies.      Impression    Impression:  1. Two episodes of valorie silent aspiration with thin liquid barium,  otherwise normal thin liquid trials with occasional transient flash  penetration.   2. Please see the speech pathologist report for further details.      TESFAYE MOONEY DO         SYSTEM ID:  [id number]   XR Chest 2 Views    Narrative    Chest 2 views    INDICATION: Dyspnea    COMPARISON: 1/9/2025    FINDINGS: Bandlike density in the right upper lung unchanged. This  could indicate subsegmental atelectasis unchanged. Mild peribronchial  cuffing unchanged. Heart size normal. Atherosclerotic calcification at  the aortic knob. Gentle levocurvature midthoracic spine. Osteoporosis  with compression deformities in the midthoracic spine.      Impression    IMPRESSION: Unchanged atelectasis, mild edema or other airway  inflammation an osteoporotic mid thoracic compression deformities  unchanged. No acute consolidation or new dominant pleural effusion.    DAVID RUIZ MD         SYSTEM ID:  [id number]   XR Chest 2 Views    Narrative    Chest 2 views    INDICATION: Respiratory distress    COMPARISON: 1/14/2025    Findings: Heart size normal. Bandlike density consistent with fluid in  fissure and/or subsegmental atelectasis in the right middle and  streaky opacities in the left lower lung zone unchanged. No ectopic  air. Atherosclerotic calcification at the aortic knob. Bones are  osteopenic with slightly excessive thoracic kyphosis.      Impression    IMPRESSION: No significant changes mammographically from 2 weeks ago.  Atherosclerosis as well.    DAVID SHABAZZ  MD JOSEPH         SYSTEM ID:  [id number]   XR Chest 2 Views    Narrative    EXAM: XR CHEST 2 VIEWS 2/1/2025 2:13 PM    DEMOGRAPHICS: 79 years Male    INDICATION: COPD worsening SOB    COMPARISON: None    TECHNIQUE: Single portable AP view of the chest.    FINDINGS:   Right middle lobe linear/bandlike atelectasis unchanged from prior  study. Streaky airspace opacities within the left lower lung field.    Trachea is midline. Normal cardiac silhouette. Mediastinum is within  normal limits. Distinct pulmonary vasculature. No significant pleural  effusion. No discernable pneumothorax.. Unremarkable upper abdomen. No  acute or suspicious osseous abnormalities. Unremarkable soft tissues.  Atherosclerosis of the aortic arch.      Impression    IMPRESSION: Improved left base atelectasis/consolidation. No new  opacities.    I have personally reviewed the examination and initial interpretation  and I agree with the findings.    SHAN ROSALES MD         SYSTEM ID:  [id number]   CT Chest w/o Contrast    Narrative    EXAM: CT chest without intravenous contrast. 2/5/2025 5:08 PM    HISTORY: left pulmonary nodule eval, hx of RUL mass and radiation  therapy, hypoxia    TECHNIQUE: Helical acquisition of image data was performed for the  chest without intravenous contrast.    COMPARISON: Chest radiograph 2/1/2025, CT chest on 1/4/2025    FINDINGS:    : Unremarkable.    Thyroid: Postsurgical changes of thyroidectomy.    Cardiac: Cardiomegaly. No pericardial effusion. Coronary artery  stents.    Vessels: Thoracic aorta and main pulmonary artery are normal in  caliber. Aortic arch calcifications.    Lymph nodes: No enlarged axillary or mediastinal lymph nodes by short  axis criteria. Evaluation of the kris is limited by lack of  intravenous contrast, however, no bulky hilar adenopathy is  appreciated.    Airways: Central airways are patent.  Frothy debris in the proximal  left mainstem bronchus.    Lungs: Moderate to severe  right greater than left centrilobular  emphysema. Stable chronic fibrotic/atelectatic changes along the right  minor fissure with associated traction bronchiectasis. Increased left  greater than right lower lobe peribronchovascular consolidative and  groundglass opacities compared to prior, with associated tree-in-bud  nodularity.    Remaining solid sub-6 mm nodules are unchanged, for example, a 4 mm  solid nodule in the posterior right upper lobe (4/82), 5 mm solid  nodule in the right lower lobe (4/215), 5 mm solid nodule in the left  lower lobe (4/221), 3 mm solid nodule in the left upper lobe (4/38).  Scattered calcified granulomas. Probable intrapulmonary lymph node  along the right minor fissure.    Pleura: No pleural effusions. No pneumothorax.    Esophagus: Patulous esophagus.    Upper abdomen: Limited in evaluation on this noncontrast exam.  Cholelithiasis without evidence of acute cholecystitis. Stable  bilateral renal cortical hypodensities, the largest measuring up to  2.0 cm in the left renal cortex, likely simple cysts. Abdominal aortic  calcific calcifications.    Bones/soft tissue: Degenerative changes of the spine. No acute or  suspicious osseous abnormality. Unchanged chronic right posterior 10th  rib deformity. Unchanged compression deformity of the T8 vertebral  body.        Impression    IMPRESSION:  1. Increased left greater than right lower lobe peribronchovascular  consolidative and groundglass opacities with tree-in-bud nodularity,  which may be secondary to an infectious/inflammatory etiology,  including aspiration pneumonitis versus atypical pneumonia. Recommend  follow-up chest CT in 3 months.  2. Bilateral sub-6 mm solid pulmonary nodules. Recommend attention on  follow-up.  3. Cholelithiasis without evidence of acute cholecystitis.    I have personally reviewed the examination and initial interpretation  and I agree with the findings.    JUAN RUELAS MD         SYSTEM ID:   [id number]   XR Chest 2 Views    Narrative    Exam: Opacities compatible with ongoing infectious/inflammatory  process., 2/11/2025 2:41 PM    Indication: Dyspnea, recent COPD exacerbation    Comparison: CT chest dated 5/20/2025    Findings:   PA and lateral views of the upright chest. Patient is rotated. Trachea  is grossly midline. Cardiomediastinal silhouette is within normal  limits. Continued streaky left basilar opacities, well demonstrated on  CT chest 2/5/2025. Decreasing pleural thickening versus fluid in the  right horizontal fissure. No pneumothorax. The costophrenic recesses  are clear. Atherosclerotic calcifications of the aortic arch.  Degenerative changes of the spine with kyphotic curvature of the  thoracic spine. Multilevel thoracic and lumbar vertebral body  compression fracture deformities. No acute osseous abnormality.      Impression    Impression: No significant radiographic change from prior. Continued  left basilar opacities.    I have personally reviewed the examination and initial interpretation  and I agree with the findings.    SILVANO GIANG MD         SYSTEM ID:  [id number]   CT Chest Pulmonary Embolism w Contrast    Narrative    EXAMINATION: CTA Pulmonary Angiogram, 2/12/2025 10:15 PM     COMPARISON: 2/5/2025 CT chest..    HISTORY: Increased dyspnea and pleurisy. Concern for acute PE.    TECHNIQUE: Volumetric helical acquisition of CT images of the chest  from the lung apices to the kidneys were acquired after the  administration of 54 mL Isovue 370.mL of Isovue-370 IV contrast.  Post-processed multiplanar and/or MIP reformations were obtained,  archived to PACS and used in interpretation of this study.     FINDINGS:    Contrast bolus is: adequate.  Exam is negative for acute pulmonary  embolism. No evidence for right heart strain.      Airways: Central airways are patent.     Lungs: Moderate emphysematous changes and apical predominance. Patchy  mixed groundglass and solid opacities in the  basilar lungs. Distinct 5  mm solid nodule in the left lower lobe (series 6, image 192). Streaky  areas of linear bandlike atelectasis in the left lower lobe. Partial  flattening of the hemidiaphragms. Soft tissue density thickening of  the right minor fissure has been stable since at least 2023.    Pleura: No pleural effusions. No pneumothorax.    Lymph nodes: No enlarged axillary, mediastinal, or hilar lymph nodes  by short axis criteria.     Cardiac: The heart is not enlarged. Trace pericardial effusion,  presumably physiologic. Moderate to severe multivessel coronary artery  calcifications.    Vessels: Thoracic aorta and main pulmonary artery are normal in  caliber.    Esophagus: Mild circumferential wall thickening of the distal  esophagus.    Upper abdomen: No definite acute pathology in the abdomen.  Postoperative changes of the right hepatic lobe posteriorly. On the  last visualized axial slice on series 4, image 252 there is what  appears to be a vascular calcification which follows the superior  arcuate arteries, this does not appear to be in the ureter.    Bones/soft tissue: Severe degenerative changes of the spine. Chronic  appearing compression fracture deformity of T8 with moderate kyphotic  angulation of the thoracic spine at this level with anterior wedging  of T8. Multilevel Schmorl's nodes deformities with intravertebral disc  vacuum phenomenon. Chronic rib fracture deformities.      Impression    IMPRESSION:   1. Exam is negative for acute pulmonary embolism. No evidence for  right heart strain.      2. Patchy mixed ground glass and consolidative opacities in the  basilar lungs may represent infectious/inflammatory etiology.    3. Incidental 5 mm solid nodule in left lower lobe has been stable  since at least 2022 PETCT.    4. Findings compatible with chronic obstructive pulmonary disease with  widened AP diameter of the chest and moderate emphysematous changes.    5. Soft tissue density  thickening of the right minor fissure has been  stable since at least 2023, although it was mass-like and  hypermetabolic on PETCT dated 2/2/22.     6. Mild circumferential wall thickening of the distal esophagus could  be due to esophagitis.    In the event of a positive result for acute pulmonary embolism  resulting in right heart strain, consider calling the   [facility] patient placement ([phone]) for PERT (Pulmonary Embolism  Response Team) Activation?    PERT -- Pulmonary Embolism Response Team (Multidisciplinary team  including cardiology, interventional radiology, critical care,  hematology)    I have personally reviewed the examination and initial interpretation  and I agree with the findings.    ELI AJ MD         SYSTEM ID:  [id number]     *Note: Due to a large number of results and/or encounters for the requested time period, some results have not been displayed. A complete set of results can be found in Results Review.       Discharge Medications   Current Discharge Medication List        START taking these medications    Details   guaiFENesin (MUCINEX) 600 MG 12 hr tablet Take 1 tablet (600 mg) by mouth 2 times daily.    Associated Diagnoses: COPD exacerbation (H)      !! ipratropium - albuterol 0.5 mg/2.5 mg/3 mL (DUONEB) 0.5-2.5 (3) MG/3ML neb solution Take 1 vial (3 mLs) by nebulization 4 times daily.    Associated Diagnoses: COPD exacerbation (H)      !! ipratropium - albuterol 0.5 mg/2.5 mg/3 mL (DUONEB) 0.5-2.5 (3) MG/3ML neb solution Take 1 vial (3 mLs) by nebulization every 4 hours as needed for wheezing.    Associated Diagnoses: COPD exacerbation (H)      Lidocaine (LIDOCARE) 4 % Patch Place 1 patch over 12 hours onto the skin every 24 hours. Apply to low back. To prevent lidocaine toxicity, patient should be patch free for 12 hrs daily.    Associated Diagnoses: Low back pain, unspecified back pain laterality, unspecified chronicity, unspecified whether sciatica present      loperamide  (IMODIUM) 2 MG capsule Take 1 capsule (2 mg) by mouth 4 times daily as needed for diarrhea.    Associated Diagnoses: Diarrhea, unspecified type      LORazepam (ATIVAN) 0.5 MG tablet Take 0.5 tablets (0.25 mg) by mouth every 6 hours as needed for anxiety (dyspnea).  Qty: 10 tablet, Refills: 0    Associated Diagnoses: Anxiety      !! melatonin 5 MG tablet Take 1 tablet (5 mg) by mouth nightly as needed for sleep.    Associated Diagnoses: Insomnia, unspecified type      !! morphine sulfate (ROXANOL) 20 mg/mL (HIGH CONC) soln Take 0.375 mLs (7.5 mg) by mouth 5 times daily.  Qty: 56.25 mL, Refills: 0    Associated Diagnoses: Chronic obstructive pulmonary disease, unspecified COPD type (H)      !! morphine sulfate (ROXANOL) 20 mg/mL (HIGH CONC) soln Take 0.25-0.5 mLs (5-10 mg) by mouth every 2 hours as needed for moderate to severe pain or shortness of breath. Take 5 mg every 2 hours as needed for dyspnea or moderate pain. Take 10 mg every 2 hours as needed for severe pain.  Qty: 10 mL, Refills: 0    Associated Diagnoses: Chronic obstructive pulmonary disease, unspecified COPD type (H)      naloxone (NARCAN) 4 MG/0.1ML nasal spray Spray 1 spray (4 mg) into one nostril alternating nostrils as needed for opioid reversal. every 2-3 minutes until assistance arrives    Associated Diagnoses: COPD exacerbation (H)      ondansetron (ZOFRAN ODT) 4 MG ODT tab Take 1 tablet (4 mg) by mouth every 6 hours as needed for nausea or vomiting.    Associated Diagnoses: Nausea      pantoprazole (PROTONIX) 40 MG EC tablet Take 1 tablet (40 mg) by mouth 2 times daily (before meals).    Associated Diagnoses: Esophagitis      polyethylene glycol (MIRALAX) 17 GM/Dose powder Take 17 g by mouth daily.    Associated Diagnoses: Constipation, unspecified constipation type      sulfamethoxazole-trimethoprim (BACTRIM) 400-80 MG tablet Take 1 tablet by mouth daily.    Associated Diagnoses: COPD exacerbation (H)       !! - Potential duplicate medications  found. Please discuss with provider.        CONTINUE these medications which have CHANGED    Details   acetaminophen (TYLENOL) 325 MG tablet Take 2 tablets (650 mg) by mouth every 4 hours as needed for fever or mild pain.    Associated Diagnoses: Low back pain, unspecified back pain laterality, unspecified chronicity, unspecified whether sciatica present      furosemide (LASIX) 20 MG tablet Take 1 tablet (20 mg) by mouth 2 times daily.    Associated Diagnoses: Chronic hypoxemic respiratory failure (H)      methocarbamol (ROBAXIN) 750 MG tablet Take 1 tablet (750 mg) by mouth 4 times daily as needed for muscle spasms.    Associated Diagnoses: Low back pain, unspecified back pain laterality, unspecified chronicity, unspecified whether sciatica present      metoprolol succinate ER (TOPROL XL) 50 MG 24 hr tablet Take 1 tablet (50 mg) by mouth daily.    Associated Diagnoses: Idiopathic hypertension      !! predniSONE (DELTASONE) 1 MG tablet Take 2 tablets (2 mg) by mouth daily.    Associated Diagnoses: Chronic obstructive pulmonary disease, unspecified COPD type (H)      !! predniSONE (DELTASONE) 10 MG tablet Take 3 tablets (30 mg) by mouth daily for 4 days.    Associated Diagnoses: COPD exacerbation (H)      !! predniSONE (DELTASONE) 10 MG tablet Take 1 tablet (10 mg) by mouth daily for 7 days.    Associated Diagnoses: COPD exacerbation (H)      !! predniSONE (DELTASONE) 20 MG tablet Take 1 tablet (20 mg) by mouth daily for 7 days.    Associated Diagnoses: COPD exacerbation (H)      !! predniSONE (DELTASONE) 5 MG tablet Take 1 tablet (5 mg) by mouth daily for 7 days.    Associated Diagnoses: COPD exacerbation (H)      senna-docusate (SENOKOT-S/PERICOLACE) 8.6-50 MG tablet Take 1 tablet by mouth 2 times daily as needed for constipation.    Associated Diagnoses: Constipation, unspecified constipation type       !! - Potential duplicate medications found. Please discuss with provider.        CONTINUE these medications  which have NOT CHANGED    Details   amLODIPine (NORVASC) 5 MG tablet Take 1 tablet (5 mg) by mouth at bedtime.  Qty: 90 tablet, Refills: 3    Associated Diagnoses: Benign essential hypertension      aspirin 81 MG EC tablet Take 81 mg by mouth 2 times daily    Associated Diagnoses: Closed fracture of neck of right femur, initial encounter (H)      azithromycin (ZITHROMAX) 500 MG tablet Take 1 tablet (500 mg) by mouth every other day  Qty: 45 tablet, Refills: 3    Associated Diagnoses: COPD, very severe (H)      calcium carbonate (OS-KARLIE) 1500 (600 Ca) MG tablet Take 600 mg by mouth daily      carbidopa-levodopa (SINEMET)  MG tablet Take 1/2 tablet three times a day for one week, then increase to 1 tablet three times a day for one week, then increase to 1.5 tablets three times a day and stay at that dose.  Qty: 135 tablet, Refills: 11    Associated Diagnoses: Parkinson's disease without dyskinesia or fluctuating manifestations (H)      carboxymethylcellulose PF (REFRESH PLUS) 0.5 % ophthalmic solution Place 1 drop into both eyes every hour as needed for dry eyes    Associated Diagnoses: Chronic obstructive pulmonary disease with acute exacerbation (H)      Fluticasone-Umeclidin-Vilanterol (TRELEGY ELLIPTA) 200-62.5-25 MCG/ACT oral inhaler Inhale 1 puff into the lungs daily In AM. Rinse mouth after use.  Qty: 180 each, Refills: 3    Associated Diagnoses: Other emphysema (H); Chronic bronchitis, unspecified chronic bronchitis type (H); Radiation pneumonitis; Mass of upper lobe of right lung; Obstructive sleep apnea; Abnormal chest CT; Pneumonia due to infectious organism, unspecified laterality, unspecified part of lung      gabapentin (NEURONTIN) 400 MG capsule Take 1 capsule (400 mg) by mouth 3 times daily  Qty: 270 capsule, Refills: 3    Associated Diagnoses: Chronic obstructive pulmonary disease with acute exacerbation (H)      levothyroxine (SYNTHROID/LEVOTHROID) 112 MCG tablet Take 1 tablet (112 mcg) by  "mouth daily.  Qty: 90 tablet, Refills: 1    Associated Diagnoses: Postsurgical hypothyroidism      !! Melatonin 10 MG TABS tablet Take 10 mg by mouth nightly as needed    Comments: Patient reported. OTC.      multivitamin w/minerals (THERA-VIT-M) tablet Take 1 tablet by mouth daily       Omega-3 Fatty Acids (FISH OIL MAXIMUM STRENGTH) 1200 MG CPDR Take 1 capsule by mouth.      rosuvastatin (CRESTOR) 10 MG tablet TAKE 1 TABLET BY MOUTH EVERY DAY  Qty: 90 tablet, Refills: 0    Associated Diagnoses: Hyperlipidemia, unspecified      Vitamin D3 (CHOLECALCIFEROL) 25 mcg (1000 units) tablet Take 1 tablet (25 mcg) by mouth daily  Qty: 180 tablet, Refills: 3    Associated Diagnoses: Other emphysema (H)      potassium chloride santo ER (KLOR-CON M20) 20 MEQ CR tablet TAKE 1 TABLET BY MOUTH 2 TIMES DAILY  Qty: 180 tablet, Refills: 1    Associated Diagnoses: Hypokalemia       !! - Potential duplicate medications found. Please discuss with provider.        STOP taking these medications       acetylcysteine (MUCOMYST) 20 % neb solution Comments:   Reason for Stopping:         albuterol (ACCUNEB) 1.25 MG/3ML neb solution Comments:   Reason for Stopping:         albuterol (PROAIR HFA/PROVENTIL HFA/VENTOLIN HFA) 108 (90 Base) MCG/ACT inhaler Comments:   Reason for Stopping:         ascorbic acid 1000 MG TABS tablet Comments:   Reason for Stopping:         hydrocortisone 2.5 % cream Comments:   Reason for Stopping:         nitroGLYcerin (NITROSTAT) 0.4 MG sublingual tablet Comments:   Reason for Stopping:         oxyCODONE IR (ROXICODONE) 10 MG tablet Comments:   Reason for Stopping:             Allergies   Allergies   Allergen Reactions    Levaquin Swelling and Difficulty breathing     Thinks it may have been anaphylaxis    Cats     Dogs     Atorvastatin Calcium Muscle Pain (Myalgia)     Muscle cramps/pain    Clopidogrel Bisulfate Other (See Comments)     Does not remember reaction; may have been \"blotchy skin\"    Hctz " [Hydrochlorothiazide] Rash     Rash on legs    Sulfasalazine Cramps     Stomach cramps

## 2025-02-18 NOTE — PLAN OF CARE
Goal Outcome Evaluation:      Plan of Care Reviewed With: patient    Overall Patient Progress: no changeOverall Patient Progress: no change    Outcome Evaluation: Pt is A&O X4. VSS. Severe SOB with excertion. On 2L oxygen. Up with SBA with walker. C/o lower back pain and managed with scheduled morphine. [facility] wheelchair ride set up by CC. Pt. Is able to make needs known. call light in reach. will contiue to inderjit.

## 2025-02-24 ENCOUNTER — DOCUMENTATION ONLY (OUTPATIENT)
Dept: GERIATRICS | Facility: CLINIC | Age: 80
End: 2025-02-24

## 2025-02-24 ENCOUNTER — NURSING HOME VISIT (OUTPATIENT)
Dept: GERIATRICS | Facility: CLINIC | Age: 80
End: 2025-02-24
Payer: MEDICARE

## 2025-02-24 VITALS
HEIGHT: 69 IN | OXYGEN SATURATION: 93 % | BODY MASS INDEX: 22.92 KG/M2 | DIASTOLIC BLOOD PRESSURE: 64 MMHG | SYSTOLIC BLOOD PRESSURE: 142 MMHG | RESPIRATION RATE: 18 BRPM | WEIGHT: 154.76 LBS | HEART RATE: 68 BPM | TEMPERATURE: 97.7 F

## 2025-02-24 DIAGNOSIS — E89.0 POSTABLATIVE HYPOTHYROIDISM: ICD-10-CM

## 2025-02-24 DIAGNOSIS — J96.11 CHRONIC RESPIRATORY FAILURE WITH HYPOXIA, ON HOME O2 THERAPY (H): ICD-10-CM

## 2025-02-24 DIAGNOSIS — I25.10 CORONARY ARTERY DISEASE INVOLVING NATIVE CORONARY ARTERY OF NATIVE HEART WITHOUT ANGINA PECTORIS: ICD-10-CM

## 2025-02-24 DIAGNOSIS — J43.1 PANLOBULAR EMPHYSEMA (H): ICD-10-CM

## 2025-02-24 DIAGNOSIS — D69.6 THROMBOCYTOPENIA: ICD-10-CM

## 2025-02-24 DIAGNOSIS — R54 FRAILTY: Primary | ICD-10-CM

## 2025-02-24 DIAGNOSIS — Z99.81 CHRONIC RESPIRATORY FAILURE WITH HYPOXIA, ON HOME O2 THERAPY (H): ICD-10-CM

## 2025-02-24 DIAGNOSIS — G20.A1 PARKINSON'S DISEASE WITHOUT DYSKINESIA OR FLUCTUATING MANIFESTATIONS (H): ICD-10-CM

## 2025-02-24 DIAGNOSIS — D72.829 LEUKOCYTOSIS, UNSPECIFIED TYPE: ICD-10-CM

## 2025-02-24 DIAGNOSIS — D84.9 IMMUNODEFICIENCY: ICD-10-CM

## 2025-02-24 DIAGNOSIS — J44.9 COPD, GROUP E, BY GOLD 2023 CLASSIFICATION (H): ICD-10-CM

## 2025-02-24 DIAGNOSIS — M81.8 OTHER OSTEOPOROSIS WITHOUT CURRENT PATHOLOGICAL FRACTURE: ICD-10-CM

## 2025-02-24 DIAGNOSIS — G89.4 CHRONIC PAIN SYNDROME: ICD-10-CM

## 2025-02-24 DIAGNOSIS — C78.00 MALIGNANT NEOPLASM METASTATIC TO LUNG, UNSPECIFIED LATERALITY (H): ICD-10-CM

## 2025-02-24 DIAGNOSIS — R53.81 PHYSICAL DECONDITIONING: ICD-10-CM

## 2025-02-24 DIAGNOSIS — M05.79 RHEUMATOID ARTHRITIS INVOLVING MULTIPLE SITES WITH POSITIVE RHEUMATOID FACTOR (H): ICD-10-CM

## 2025-02-24 DIAGNOSIS — C73 METASTASIS FROM THYROID CANCER (H): ICD-10-CM

## 2025-02-24 DIAGNOSIS — C79.9 METASTASIS FROM THYROID CANCER (H): ICD-10-CM

## 2025-02-24 PROBLEM — A41.9 SEVERE SEPSIS (H): Status: RESOLVED | Noted: 2025-01-04 | Resolved: 2025-02-24

## 2025-02-24 PROBLEM — J18.9 HOSPITAL-ACQUIRED PNEUMONIA: Status: RESOLVED | Noted: 2024-01-15 | Resolved: 2025-02-24

## 2025-02-24 PROBLEM — R65.20 SEVERE SEPSIS (H): Status: RESOLVED | Noted: 2025-01-04 | Resolved: 2025-02-24

## 2025-02-24 PROBLEM — R53.1 GENERALIZED WEAKNESS: Status: RESOLVED | Noted: 2025-01-04 | Resolved: 2025-02-24
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
## 2025-02-24 PROBLEM — Z01.818 PREOPERATIVE EXAMINATION: Status: RESOLVED | Noted: 2021-12-29 | Resolved: 2025-02-24

## 2025-02-24 PROCEDURE — 99418 PROLNG IP/OBS E/M EA 15 MIN: CPT | Mod: GV | Performed by: FAMILY MEDICINE

## 2025-02-24 PROCEDURE — 99306 1ST NF CARE HIGH MDM 50: CPT | Mod: GV | Performed by: FAMILY MEDICINE
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
## 2025-02-25 ENCOUNTER — LAB REQUISITION (OUTPATIENT)
Dept: LAB | Facility: CLINIC | Age: 80
End: 2025-02-25
Payer: MEDICARE

## 2025-02-25 DIAGNOSIS — D64.9 ANEMIA, UNSPECIFIED: ICD-10-CM

## 2025-02-25 DIAGNOSIS — C73 MALIGNANT NEOPLASM OF THYROID GLAND (H): ICD-10-CM

## 2025-02-26 ENCOUNTER — TELEPHONE (OUTPATIENT)
Dept: GERIATRICS | Facility: CLINIC | Age: 80
End: 2025-02-26

## 2025-02-26 ENCOUNTER — DOCUMENTATION ONLY (OUTPATIENT)
Dept: GERIATRICS | Facility: CLINIC | Age: 80
End: 2025-02-26
Payer: MEDICARE

## 2025-02-26 LAB
ALBUMIN SERPL BCG-MCNC: 3.6 G/DL (ref 3.5–5.2)
ALP SERPL-CCNC: 73 U/L (ref 40–150)
ALT SERPL W P-5'-P-CCNC: 11 U/L (ref 0–70)
ANION GAP SERPL CALCULATED.3IONS-SCNC: 11 MMOL/L (ref 7–15)
AST SERPL W P-5'-P-CCNC: 22 U/L (ref 0–45)
BASOPHILS # BLD AUTO: 0 10E3/UL (ref 0–0.2)
BASOPHILS NFR BLD AUTO: 0 %
BILIRUB SERPL-MCNC: 0.6 MG/DL
BUN SERPL-MCNC: 20.7 MG/DL (ref 8–23)
CALCIUM SERPL-MCNC: 8.9 MG/DL (ref 8.8–10.4)
CHLORIDE SERPL-SCNC: 102 MMOL/L (ref 98–107)
CREAT SERPL-MCNC: 0.94 MG/DL (ref 0.67–1.17)
EGFRCR SERPLBLD CKD-EPI 2021: 83 ML/MIN/1.73M2
EOSINOPHIL # BLD AUTO: 0.2 10E3/UL (ref 0–0.7)
EOSINOPHIL NFR BLD AUTO: 2 %
ERYTHROCYTE [DISTWIDTH] IN BLOOD BY AUTOMATED COUNT: 17.1 % (ref 10–15)
GLUCOSE SERPL-MCNC: 121 MG/DL (ref 70–99)
HCO3 SERPL-SCNC: 30 MMOL/L (ref 22–29)
HCT VFR BLD AUTO: 40.5 % (ref 40–53)
HGB BLD-MCNC: 13 G/DL (ref 13.3–17.7)
IMM GRANULOCYTES # BLD: 0.1 10E3/UL
IMM GRANULOCYTES NFR BLD: 1 %
LYMPHOCYTES # BLD AUTO: 0.8 10E3/UL (ref 0.8–5.3)
LYMPHOCYTES NFR BLD AUTO: 7 %
MCH RBC QN AUTO: 31.8 PG (ref 26.5–33)
MCHC RBC AUTO-ENTMCNC: 32.1 G/DL (ref 31.5–36.5)
MCV RBC AUTO: 99 FL (ref 78–100)
MONOCYTES # BLD AUTO: 0.4 10E3/UL (ref 0–1.3)
MONOCYTES NFR BLD AUTO: 4 %
NEUTROPHILS # BLD AUTO: 9.6 10E3/UL (ref 1.6–8.3)
NEUTROPHILS NFR BLD AUTO: 85 %
NRBC # BLD AUTO: 0 10E3/UL
NRBC BLD AUTO-RTO: 0 /100
PLATELET # BLD AUTO: 73 10E3/UL (ref 150–450)
POTASSIUM SERPL-SCNC: 4.5 MMOL/L (ref 3.4–5.3)
PROT SERPL-MCNC: 5.8 G/DL (ref 6.4–8.3)
RBC # BLD AUTO: 4.09 10E6/UL (ref 4.4–5.9)
RETICS # AUTO: 0.15 10E6/UL (ref 0.03–0.1)
RETICS/RBC NFR AUTO: 3.6 % (ref 0.5–2)
SODIUM SERPL-SCNC: 143 MMOL/L (ref 135–145)
TSH SERPL DL<=0.005 MIU/L-ACNC: 7.84 UIU/ML (ref 0.3–4.2)
VIT D+METAB SERPL-MCNC: 35 NG/ML (ref 20–50)
WBC # BLD AUTO: 11.2 10E3/UL (ref 4–11)

## 2025-02-26 PROCEDURE — 82306 VITAMIN D 25 HYDROXY: CPT | Mod: ORL | Performed by: FAMILY MEDICINE

## 2025-02-26 PROCEDURE — 85045 AUTOMATED RETICULOCYTE COUNT: CPT | Mod: ORL | Performed by: FAMILY MEDICINE

## 2025-02-26 PROCEDURE — 84443 ASSAY THYROID STIM HORMONE: CPT | Mod: ORL | Performed by: FAMILY MEDICINE

## 2025-02-26 PROCEDURE — 80053 COMPREHEN METABOLIC PANEL: CPT | Mod: ORL | Performed by: FAMILY MEDICINE

## 2025-02-26 PROCEDURE — 85025 COMPLETE CBC W/AUTO DIFF WBC: CPT | Mod: ORL | Performed by: FAMILY MEDICINE

## 2025-02-26 PROCEDURE — P9604 ONE-WAY ALLOW PRORATED TRIP: HCPCS | Mod: ORL | Performed by: FAMILY MEDICINE

## 2025-02-26 PROCEDURE — 36415 COLL VENOUS BLD VENIPUNCTURE: CPT | Mod: ORL | Performed by: FAMILY MEDICINE

## 2025-02-26 RX ORDER — LEVOTHYROXINE SODIUM 125 UG/1
125 TABLET ORAL DAILY
COMMUNITY

## 2025-02-26 NOTE — TELEPHONE ENCOUNTER
Orders given to ROGERS Lin RN    ----- Message from Benjamin Rosenstein sent at 2/26/2025  2:13 PM CST -----  Please call [facility]     -Increase levothyroxine to 125mcg daily  -Change Aspirin to 1 tablet daily   -Discontinue metoprolol    Thank you  Benjamin Rosenstein, MD, MA

## 2025-02-28 ENCOUNTER — HOSPITAL ENCOUNTER (INPATIENT)
Facility: CLINIC | Age: 80
DRG: 177 | End: 2025-02-28
Attending: EMERGENCY MEDICINE | Admitting: INTERNAL MEDICINE
Payer: MEDICARE

## 2025-02-28 ENCOUNTER — APPOINTMENT (OUTPATIENT)
Dept: GENERAL RADIOLOGY | Facility: CLINIC | Age: 80
End: 2025-02-28
Attending: EMERGENCY MEDICINE
Payer: MEDICARE

## 2025-02-28 DIAGNOSIS — M54.50 LOW BACK PAIN, UNSPECIFIED BACK PAIN LATERALITY, UNSPECIFIED CHRONICITY, UNSPECIFIED WHETHER SCIATICA PRESENT: ICD-10-CM

## 2025-02-28 DIAGNOSIS — F41.9 ANXIETY: ICD-10-CM

## 2025-02-28 DIAGNOSIS — R09.02 HYPOXIA: ICD-10-CM

## 2025-02-28 DIAGNOSIS — J44.1 COPD EXACERBATION (H): ICD-10-CM

## 2025-02-28 DIAGNOSIS — J96.11 CHRONIC HYPOXEMIC RESPIRATORY FAILURE (H): ICD-10-CM

## 2025-02-28 DIAGNOSIS — E87.6 HYPOKALEMIA: ICD-10-CM

## 2025-02-28 DIAGNOSIS — R06.02 SHORTNESS OF BREATH: Primary | ICD-10-CM

## 2025-02-28 DIAGNOSIS — J44.9 COPD, VERY SEVERE (H): ICD-10-CM

## 2025-02-28 DIAGNOSIS — J96.12 CHRONIC RESPIRATORY FAILURE WITH HYPERCAPNIA (H): ICD-10-CM

## 2025-02-28 DIAGNOSIS — G20.A1 PARKINSON'S DISEASE WITHOUT DYSKINESIA OR FLUCTUATING MANIFESTATIONS (H): ICD-10-CM

## 2025-02-28 LAB
ALBUMIN SERPL BCG-MCNC: 3.7 G/DL (ref 3.5–5.2)
ALP SERPL-CCNC: 86 U/L (ref 40–150)
ALT SERPL W P-5'-P-CCNC: 23 U/L (ref 0–70)
ANION GAP SERPL CALCULATED.3IONS-SCNC: 10 MMOL/L (ref 7–15)
AST SERPL W P-5'-P-CCNC: 25 U/L (ref 0–45)
BASE EXCESS BLDV CALC-SCNC: 3.6 MMOL/L (ref -3–3)
BASE EXCESS BLDV CALC-SCNC: 3.9 MMOL/L (ref -3–3)
BASE EXCESS BLDV CALC-SCNC: 4 MMOL/L (ref -3–3)
BASOPHILS # BLD AUTO: 0 10E3/UL (ref 0–0.2)
BASOPHILS NFR BLD AUTO: 0 %
BILIRUB SERPL-MCNC: 0.5 MG/DL
BUN SERPL-MCNC: 24.7 MG/DL (ref 8–23)
CALCIUM SERPL-MCNC: 9.1 MG/DL (ref 8.8–10.4)
CHLORIDE SERPL-SCNC: 102 MMOL/L (ref 98–107)
CREAT SERPL-MCNC: 1.17 MG/DL (ref 0.67–1.17)
D DIMER PPP FEU-MCNC: 0.54 UG/ML FEU (ref 0–0.5)
EGFRCR SERPLBLD CKD-EPI 2021: 64 ML/MIN/1.73M2
EOSINOPHIL # BLD AUTO: 0 10E3/UL (ref 0–0.7)
EOSINOPHIL NFR BLD AUTO: 0 %
ERYTHROCYTE [DISTWIDTH] IN BLOOD BY AUTOMATED COUNT: 16.5 % (ref 10–15)
GLUCOSE SERPL-MCNC: 168 MG/DL (ref 70–99)
HCO3 BLDV-SCNC: 30 MMOL/L (ref 21–28)
HCO3 BLDV-SCNC: 30 MMOL/L (ref 21–28)
HCO3 BLDV-SCNC: 31 MMOL/L (ref 21–28)
HCO3 SERPL-SCNC: 28 MMOL/L (ref 22–29)
HCT VFR BLD AUTO: 43 % (ref 40–53)
HGB BLD-MCNC: 14 G/DL (ref 13.3–17.7)
HOLD SPECIMEN: NORMAL
IMM GRANULOCYTES # BLD: 0.1 10E3/UL
IMM GRANULOCYTES NFR BLD: 1 %
LACTATE BLD-SCNC: 2.2 MMOL/L
LACTATE SERPL-SCNC: 2.4 MMOL/L (ref 0.7–2)
LACTATE SERPL-SCNC: 3.7 MMOL/L (ref 0.7–2)
LYMPHOCYTES # BLD AUTO: 0.8 10E3/UL (ref 0.8–5.3)
LYMPHOCYTES NFR BLD AUTO: 8 %
MCH RBC QN AUTO: 31.5 PG (ref 26.5–33)
MCHC RBC AUTO-ENTMCNC: 32.6 G/DL (ref 31.5–36.5)
MCV RBC AUTO: 97 FL (ref 78–100)
MONOCYTES # BLD AUTO: 0.5 10E3/UL (ref 0–1.3)
MONOCYTES NFR BLD AUTO: 5 %
NEUTROPHILS # BLD AUTO: 8.8 10E3/UL (ref 1.6–8.3)
NEUTROPHILS NFR BLD AUTO: 87 %
NRBC # BLD AUTO: 0 10E3/UL
NRBC BLD AUTO-RTO: 0 /100
NT-PROBNP SERPL-MCNC: 1174 PG/ML (ref 0–1800)
O2/TOTAL GAS SETTING VFR VENT: 30 %
O2/TOTAL GAS SETTING VFR VENT: 6 %
OXYHGB MFR BLDV: 64 % (ref 70–75)
OXYHGB MFR BLDV: 91 % (ref 70–75)
PCO2 BLDV: 51 MM HG (ref 40–50)
PCO2 BLDV: 55 MM HG (ref 40–50)
PCO2 BLDV: 56 MM HG (ref 40–50)
PH BLDV: 7.35 [PH] (ref 7.32–7.43)
PH BLDV: 7.35 [PH] (ref 7.32–7.43)
PH BLDV: 7.38 [PH] (ref 7.32–7.43)
PLAT MORPH BLD: NORMAL
PLATELET # BLD AUTO: 34 10E3/UL (ref 150–450)
PO2 BLDV: 38 MM HG (ref 25–47)
PO2 BLDV: 38 MM HG (ref 25–47)
PO2 BLDV: 69 MM HG (ref 25–47)
POTASSIUM BLD-SCNC: 6.1 MMOL/L (ref 3.4–5.3)
POTASSIUM SERPL-SCNC: 5.2 MMOL/L (ref 3.4–5.3)
PROCALCITONIN SERPL IA-MCNC: 0.19 NG/ML
PROT SERPL-MCNC: 6.3 G/DL (ref 6.4–8.3)
RBC # BLD AUTO: 4.45 10E6/UL (ref 4.4–5.9)
RBC MORPH BLD: NORMAL
SAO2 % BLDV: 65.3 % (ref 70–75)
SAO2 % BLDV: 68 % (ref 70–75)
SAO2 % BLDV: 92.8 % (ref 70–75)
SODIUM SERPL-SCNC: 140 MMOL/L (ref 135–145)
TROPONIN T SERPL HS-MCNC: 27 NG/L
TROPONIN T SERPL HS-MCNC: 28 NG/L
WBC # BLD AUTO: 10.1 10E3/UL (ref 4–11)

## 2025-02-28 PROCEDURE — 83880 ASSAY OF NATRIURETIC PEPTIDE: CPT | Performed by: EMERGENCY MEDICINE

## 2025-02-28 PROCEDURE — 71045 X-RAY EXAM CHEST 1 VIEW: CPT

## 2025-02-28 PROCEDURE — 258N000003 HC RX IP 258 OP 636: Performed by: EMERGENCY MEDICINE

## 2025-02-28 PROCEDURE — 99291 CRITICAL CARE FIRST HOUR: CPT | Mod: 25 | Performed by: EMERGENCY MEDICINE

## 2025-02-28 PROCEDURE — 96375 TX/PRO/DX INJ NEW DRUG ADDON: CPT | Performed by: EMERGENCY MEDICINE

## 2025-02-28 PROCEDURE — 93308 TTE F-UP OR LMTD: CPT | Performed by: EMERGENCY MEDICINE

## 2025-02-28 PROCEDURE — 85379 FIBRIN DEGRADATION QUANT: CPT

## 2025-02-28 PROCEDURE — 82955 ASSAY OF G6PD ENZYME: CPT | Performed by: INTERNAL MEDICINE

## 2025-02-28 PROCEDURE — 82803 BLOOD GASES ANY COMBINATION: CPT

## 2025-02-28 PROCEDURE — 82435 ASSAY OF BLOOD CHLORIDE: CPT | Performed by: EMERGENCY MEDICINE

## 2025-02-28 PROCEDURE — 999N000157 HC STATISTIC RCP TIME EA 10 MIN

## 2025-02-28 PROCEDURE — 84145 PROCALCITONIN (PCT): CPT

## 2025-02-28 PROCEDURE — 83605 ASSAY OF LACTIC ACID: CPT | Performed by: EMERGENCY MEDICINE

## 2025-02-28 PROCEDURE — 93010 ELECTROCARDIOGRAM REPORT: CPT | Mod: 59 | Performed by: EMERGENCY MEDICINE

## 2025-02-28 PROCEDURE — 84484 ASSAY OF TROPONIN QUANT: CPT | Performed by: EMERGENCY MEDICINE

## 2025-02-28 PROCEDURE — 84460 ALANINE AMINO (ALT) (SGPT): CPT | Performed by: EMERGENCY MEDICINE

## 2025-02-28 PROCEDURE — 93308 TTE F-UP OR LMTD: CPT | Mod: 26 | Performed by: EMERGENCY MEDICINE

## 2025-02-28 PROCEDURE — 85004 AUTOMATED DIFF WBC COUNT: CPT | Performed by: EMERGENCY MEDICINE

## 2025-02-28 PROCEDURE — 36415 COLL VENOUS BLD VENIPUNCTURE: CPT | Performed by: EMERGENCY MEDICINE

## 2025-02-28 PROCEDURE — 250N000011 HC RX IP 250 OP 636: Performed by: EMERGENCY MEDICINE

## 2025-02-28 PROCEDURE — 96365 THER/PROPH/DIAG IV INF INIT: CPT | Performed by: EMERGENCY MEDICINE

## 2025-02-28 PROCEDURE — 82805 BLOOD GASES W/O2 SATURATION: CPT | Performed by: EMERGENCY MEDICINE

## 2025-02-28 PROCEDURE — 250N000009 HC RX 250: Performed by: EMERGENCY MEDICINE

## 2025-02-28 PROCEDURE — 87040 BLOOD CULTURE FOR BACTERIA: CPT | Performed by: EMERGENCY MEDICINE

## 2025-02-28 PROCEDURE — 71045 X-RAY EXAM CHEST 1 VIEW: CPT | Mod: 26 | Performed by: RADIOLOGY

## 2025-02-28 PROCEDURE — 93005 ELECTROCARDIOGRAM TRACING: CPT | Mod: 59 | Performed by: EMERGENCY MEDICINE

## 2025-02-28 PROCEDURE — 120N000003 HC R&B IMCU UMMC

## 2025-02-28 RX ORDER — PREDNISONE 20 MG/1
40 TABLET ORAL DAILY
Status: DISCONTINUED | OUTPATIENT
Start: 2025-03-01 | End: 2025-03-03

## 2025-02-28 RX ORDER — NALOXONE HYDROCHLORIDE 0.4 MG/ML
0.4 INJECTION, SOLUTION INTRAMUSCULAR; INTRAVENOUS; SUBCUTANEOUS
Status: DISCONTINUED | OUTPATIENT
Start: 2025-02-28 | End: 2025-03-12 | Stop reason: HOSPADM

## 2025-02-28 RX ORDER — FUROSEMIDE 20 MG/1
20 TABLET ORAL
Status: DISCONTINUED | OUTPATIENT
Start: 2025-03-01 | End: 2025-03-03

## 2025-02-28 RX ORDER — ONDANSETRON 4 MG/1
4 TABLET, ORALLY DISINTEGRATING ORAL EVERY 6 HOURS PRN
Status: DISCONTINUED | OUTPATIENT
Start: 2025-02-28 | End: 2025-03-12 | Stop reason: HOSPADM

## 2025-02-28 RX ORDER — GUAIFENESIN 600 MG/1
600 TABLET, EXTENDED RELEASE ORAL 2 TIMES DAILY
Status: DISCONTINUED | OUTPATIENT
Start: 2025-02-28 | End: 2025-03-12 | Stop reason: HOSPADM

## 2025-02-28 RX ORDER — CALCIUM CARBONATE 500 MG/1
1000 TABLET, CHEWABLE ORAL 4 TIMES DAILY PRN
Status: DISCONTINUED | OUTPATIENT
Start: 2025-02-28 | End: 2025-03-12 | Stop reason: HOSPADM

## 2025-02-28 RX ORDER — LORAZEPAM 0.5 MG/1
0.25 TABLET ORAL EVERY 6 HOURS PRN
Status: DISCONTINUED | OUTPATIENT
Start: 2025-02-28 | End: 2025-03-12 | Stop reason: HOSPADM

## 2025-02-28 RX ORDER — ACETAMINOPHEN 325 MG/1
650 TABLET ORAL EVERY 4 HOURS PRN
Status: DISCONTINUED | OUTPATIENT
Start: 2025-02-28 | End: 2025-03-12 | Stop reason: HOSPADM

## 2025-02-28 RX ORDER — MORPHINE SULFATE 20 MG/ML
7.5 SOLUTION ORAL
Status: DISCONTINUED | OUTPATIENT
Start: 2025-03-01 | End: 2025-03-04

## 2025-02-28 RX ORDER — NALOXONE HYDROCHLORIDE 0.4 MG/ML
0.2 INJECTION, SOLUTION INTRAMUSCULAR; INTRAVENOUS; SUBCUTANEOUS
Status: DISCONTINUED | OUTPATIENT
Start: 2025-02-28 | End: 2025-03-12 | Stop reason: HOSPADM

## 2025-02-28 RX ORDER — AMOXICILLIN 250 MG
1 CAPSULE ORAL 2 TIMES DAILY PRN
Status: DISCONTINUED | OUTPATIENT
Start: 2025-02-28 | End: 2025-03-12 | Stop reason: HOSPADM

## 2025-02-28 RX ORDER — METHOCARBAMOL 750 MG/1
750 TABLET, FILM COATED ORAL 4 TIMES DAILY PRN
Status: DISCONTINUED | OUTPATIENT
Start: 2025-02-28 | End: 2025-03-12 | Stop reason: HOSPADM

## 2025-02-28 RX ORDER — LIDOCAINE 4 G/G
1 PATCH TOPICAL EVERY 24 HOURS
Status: DISCONTINUED | OUTPATIENT
Start: 2025-02-28 | End: 2025-03-12 | Stop reason: HOSPADM

## 2025-02-28 RX ORDER — CEFTRIAXONE 1 G/1
1 INJECTION, POWDER, FOR SOLUTION INTRAMUSCULAR; INTRAVENOUS EVERY 24 HOURS
Status: DISCONTINUED | OUTPATIENT
Start: 2025-03-01 | End: 2025-03-01

## 2025-02-28 RX ORDER — ONDANSETRON 2 MG/ML
4 INJECTION INTRAMUSCULAR; INTRAVENOUS EVERY 6 HOURS PRN
Status: DISCONTINUED | OUTPATIENT
Start: 2025-02-28 | End: 2025-03-12 | Stop reason: HOSPADM

## 2025-02-28 RX ORDER — PANTOPRAZOLE SODIUM 40 MG/1
40 TABLET, DELAYED RELEASE ORAL
Status: DISCONTINUED | OUTPATIENT
Start: 2025-03-01 | End: 2025-03-12 | Stop reason: HOSPADM

## 2025-02-28 RX ORDER — BISACODYL 10 MG
10 SUPPOSITORY, RECTAL RECTAL DAILY PRN
Status: DISCONTINUED | OUTPATIENT
Start: 2025-02-28 | End: 2025-03-12 | Stop reason: HOSPADM

## 2025-02-28 RX ORDER — LIDOCAINE 40 MG/G
CREAM TOPICAL
Status: DISCONTINUED | OUTPATIENT
Start: 2025-02-28 | End: 2025-03-01

## 2025-02-28 RX ORDER — METHYLPREDNISOLONE SODIUM SUCCINATE 125 MG/2ML
125 INJECTION INTRAMUSCULAR; INTRAVENOUS ONCE
Status: COMPLETED | OUTPATIENT
Start: 2025-02-28 | End: 2025-02-28

## 2025-02-28 RX ORDER — POLYETHYLENE GLYCOL 3350 17 G/17G
17 POWDER, FOR SOLUTION ORAL 2 TIMES DAILY PRN
Status: DISCONTINUED | OUTPATIENT
Start: 2025-02-28 | End: 2025-03-03

## 2025-02-28 RX ORDER — ASPIRIN 81 MG/1
81 TABLET ORAL DAILY
Status: DISCONTINUED | OUTPATIENT
Start: 2025-03-01 | End: 2025-03-12 | Stop reason: HOSPADM

## 2025-02-28 RX ORDER — IPRATROPIUM BROMIDE AND ALBUTEROL SULFATE 2.5; .5 MG/3ML; MG/3ML
3 SOLUTION RESPIRATORY (INHALATION)
Status: COMPLETED | OUTPATIENT
Start: 2025-02-28 | End: 2025-03-03

## 2025-02-28 RX ORDER — CEFTRIAXONE 1 G/1
1 INJECTION, POWDER, FOR SOLUTION INTRAMUSCULAR; INTRAVENOUS ONCE
Status: COMPLETED | OUTPATIENT
Start: 2025-02-28 | End: 2025-02-28

## 2025-02-28 RX ORDER — IPRATROPIUM BROMIDE AND ALBUTEROL SULFATE 2.5; .5 MG/3ML; MG/3ML
3 SOLUTION RESPIRATORY (INHALATION) EVERY 30 MIN PRN
Status: COMPLETED | OUTPATIENT
Start: 2025-02-28 | End: 2025-02-28

## 2025-02-28 RX ORDER — POTASSIUM CHLORIDE 750 MG/1
20 TABLET, EXTENDED RELEASE ORAL 2 TIMES DAILY
Status: DISCONTINUED | OUTPATIENT
Start: 2025-02-28 | End: 2025-02-28

## 2025-02-28 RX ORDER — AMOXICILLIN 250 MG
2 CAPSULE ORAL 2 TIMES DAILY PRN
Status: DISCONTINUED | OUTPATIENT
Start: 2025-02-28 | End: 2025-03-12 | Stop reason: HOSPADM

## 2025-02-28 RX ORDER — POLYETHYLENE GLYCOL 3350 17 G/17G
17 POWDER, FOR SOLUTION ORAL DAILY
Status: DISCONTINUED | OUTPATIENT
Start: 2025-03-01 | End: 2025-03-03

## 2025-02-28 RX ORDER — AMLODIPINE BESYLATE 5 MG/1
5 TABLET ORAL AT BEDTIME
Status: DISCONTINUED | OUTPATIENT
Start: 2025-02-28 | End: 2025-03-12 | Stop reason: HOSPADM

## 2025-02-28 RX ORDER — LEVOTHYROXINE SODIUM 125 UG/1
125 TABLET ORAL DAILY
Status: DISCONTINUED | OUTPATIENT
Start: 2025-03-01 | End: 2025-03-12 | Stop reason: HOSPADM

## 2025-02-28 RX ORDER — IPRATROPIUM BROMIDE AND ALBUTEROL SULFATE 2.5; .5 MG/3ML; MG/3ML
3 SOLUTION RESPIRATORY (INHALATION)
Status: DISCONTINUED | OUTPATIENT
Start: 2025-03-01 | End: 2025-03-01

## 2025-02-28 RX ORDER — ROSUVASTATIN CALCIUM 10 MG/1
10 TABLET, COATED ORAL DAILY
Status: DISCONTINUED | OUTPATIENT
Start: 2025-03-01 | End: 2025-03-12 | Stop reason: HOSPADM

## 2025-02-28 RX ORDER — MORPHINE SULFATE 20 MG/ML
5-10 SOLUTION ORAL
Status: DISCONTINUED | OUTPATIENT
Start: 2025-02-28 | End: 2025-03-10

## 2025-02-28 RX ORDER — VITAMIN B COMPLEX
25 TABLET ORAL DAILY
Status: DISCONTINUED | OUTPATIENT
Start: 2025-03-01 | End: 2025-03-12 | Stop reason: HOSPADM

## 2025-02-28 RX ORDER — IPRATROPIUM BROMIDE AND ALBUTEROL SULFATE 2.5; .5 MG/3ML; MG/3ML
3 SOLUTION RESPIRATORY (INHALATION) ONCE
Status: COMPLETED | OUTPATIENT
Start: 2025-02-28 | End: 2025-02-28

## 2025-02-28 RX ADMIN — IPRATROPIUM BROMIDE AND ALBUTEROL SULFATE 3 ML: .5; 3 SOLUTION RESPIRATORY (INHALATION) at 22:23

## 2025-02-28 RX ADMIN — AZITHROMYCIN MONOHYDRATE 500 MG: 500 INJECTION, POWDER, LYOPHILIZED, FOR SOLUTION INTRAVENOUS at 22:21

## 2025-02-28 RX ADMIN — SODIUM CHLORIDE 1000 ML: 9 INJECTION, SOLUTION INTRAVENOUS at 22:17

## 2025-02-28 RX ADMIN — CEFTRIAXONE SODIUM 1 G: 1 INJECTION, POWDER, FOR SOLUTION INTRAMUSCULAR; INTRAVENOUS at 21:44

## 2025-02-28 RX ADMIN — IPRATROPIUM BROMIDE AND ALBUTEROL SULFATE 3 ML: .5; 3 SOLUTION RESPIRATORY (INHALATION) at 21:18

## 2025-02-28 RX ADMIN — IPRATROPIUM BROMIDE AND ALBUTEROL SULFATE 3 ML: .5; 3 SOLUTION RESPIRATORY (INHALATION) at 22:16

## 2025-02-28 RX ADMIN — METHYLPREDNISOLONE SODIUM SUCCINATE 125 MG: 125 INJECTION, POWDER, FOR SOLUTION INTRAMUSCULAR; INTRAVENOUS at 21:18

## 2025-02-28 ASSESSMENT — COLUMBIA-SUICIDE SEVERITY RATING SCALE - C-SSRS
2. HAVE YOU ACTUALLY HAD ANY THOUGHTS OF KILLING YOURSELF IN THE PAST MONTH?: NO
1. IN THE PAST MONTH, HAVE YOU WISHED YOU WERE DEAD OR WISHED YOU COULD GO TO SLEEP AND NOT WAKE UP?: NO
6. HAVE YOU EVER DONE ANYTHING, STARTED TO DO ANYTHING, OR PREPARED TO DO ANYTHING TO END YOUR LIFE?: NO

## 2025-02-28 ASSESSMENT — ACTIVITIES OF DAILY LIVING (ADL)
ADLS_ACUITY_SCORE: 58

## 2025-03-01 ENCOUNTER — APPOINTMENT (OUTPATIENT)
Dept: CT IMAGING | Facility: CLINIC | Age: 80
DRG: 177 | End: 2025-03-01
Attending: INTERNAL MEDICINE
Payer: MEDICARE

## 2025-03-01 LAB
ALBUMIN SERPL BCG-MCNC: 3.4 G/DL (ref 3.5–5.2)
ALP SERPL-CCNC: 80 U/L (ref 40–150)
ALT SERPL W P-5'-P-CCNC: 43 U/L (ref 0–70)
ANION GAP SERPL CALCULATED.3IONS-SCNC: 13 MMOL/L (ref 7–15)
AST SERPL W P-5'-P-CCNC: 36 U/L (ref 0–45)
ATRIAL RATE - MUSE: 88 BPM
ATRIAL RATE - MUSE: 97 BPM
BASE EXCESS BLDV CALC-SCNC: 4 MMOL/L (ref -3–3)
BASOPHILS # BLD AUTO: 0 10E3/UL (ref 0–0.2)
BASOPHILS NFR BLD AUTO: 0 %
BILIRUB SERPL-MCNC: 0.5 MG/DL
BUN SERPL-MCNC: 19 MG/DL (ref 8–23)
CALCIUM SERPL-MCNC: 8.3 MG/DL (ref 8.8–10.4)
CHLORIDE SERPL-SCNC: 102 MMOL/L (ref 98–107)
CREAT SERPL-MCNC: 0.93 MG/DL (ref 0.67–1.17)
DIASTOLIC BLOOD PRESSURE - MUSE: NORMAL MMHG
DIASTOLIC BLOOD PRESSURE - MUSE: NORMAL MMHG
EGFRCR SERPLBLD CKD-EPI 2021: 85 ML/MIN/1.73M2
EOSINOPHIL # BLD AUTO: 0 10E3/UL (ref 0–0.7)
EOSINOPHIL NFR BLD AUTO: 0 %
ERYTHROCYTE [DISTWIDTH] IN BLOOD BY AUTOMATED COUNT: 16.2 % (ref 10–15)
GLUCOSE BLDC GLUCOMTR-MCNC: 123 MG/DL (ref 70–99)
GLUCOSE SERPL-MCNC: 145 MG/DL (ref 70–99)
HCO3 BLDV-SCNC: 29 MMOL/L (ref 21–28)
HCO3 SERPL-SCNC: 26 MMOL/L (ref 22–29)
HCT VFR BLD AUTO: 38.1 % (ref 40–53)
HGB BLD-MCNC: 12.1 G/DL (ref 13.3–17.7)
HOLD SPECIMEN: NORMAL
HOLD SPECIMEN: NORMAL
IMM GRANULOCYTES # BLD: 0.1 10E3/UL
IMM GRANULOCYTES NFR BLD: 1 %
INTERPRETATION ECG - MUSE: NORMAL
INTERPRETATION ECG - MUSE: NORMAL
L PNEUMO1 AG UR QL IA: NEGATIVE
LACTATE SERPL-SCNC: 2.9 MMOL/L (ref 0.7–2)
LACTATE SERPL-SCNC: 3.2 MMOL/L (ref 0.7–2)
LACTATE SERPL-SCNC: 3.4 MMOL/L (ref 0.7–2)
LACTATE SERPL-SCNC: 4.1 MMOL/L (ref 0.7–2)
LYMPHOCYTES # BLD AUTO: 0.2 10E3/UL (ref 0.8–5.3)
LYMPHOCYTES NFR BLD AUTO: 2 %
MCH RBC QN AUTO: 31.2 PG (ref 26.5–33)
MCHC RBC AUTO-ENTMCNC: 31.8 G/DL (ref 31.5–36.5)
MCV RBC AUTO: 98 FL (ref 78–100)
MONOCYTES # BLD AUTO: 0.1 10E3/UL (ref 0–1.3)
MONOCYTES NFR BLD AUTO: 1 %
MRSA DNA SPEC QL NAA+PROBE: NEGATIVE
NEUTROPHILS # BLD AUTO: 10.9 10E3/UL (ref 1.6–8.3)
NEUTROPHILS NFR BLD AUTO: 96 %
NRBC # BLD AUTO: 0 10E3/UL
NRBC BLD AUTO-RTO: 0 /100
O2/TOTAL GAS SETTING VFR VENT: 30 %
OXYHGB MFR BLDV: 60 % (ref 70–75)
P AXIS - MUSE: 29 DEGREES
P AXIS - MUSE: 54 DEGREES
PCO2 BLDV: 46 MM HG (ref 40–50)
PH BLDV: 7.41 [PH] (ref 7.32–7.43)
PLATELET # BLD AUTO: 39 10E3/UL (ref 150–450)
PO2 BLDV: 33 MM HG (ref 25–47)
POTASSIUM SERPL-SCNC: 4.4 MMOL/L (ref 3.4–5.3)
POTASSIUM SERPL-SCNC: 4.6 MMOL/L (ref 3.4–5.3)
POTASSIUM SERPL-SCNC: 5.4 MMOL/L (ref 3.4–5.3)
PR INTERVAL - MUSE: 156 MS
PR INTERVAL - MUSE: 166 MS
PROT SERPL-MCNC: 6 G/DL (ref 6.4–8.3)
QRS DURATION - MUSE: 64 MS
QRS DURATION - MUSE: 70 MS
QT - MUSE: 338 MS
QT - MUSE: 364 MS
QTC - MUSE: 429 MS
QTC - MUSE: 440 MS
R AXIS - MUSE: 11 DEGREES
R AXIS - MUSE: 88 DEGREES
RBC # BLD AUTO: 3.88 10E6/UL (ref 4.4–5.9)
S PNEUM AG SPEC QL: NEGATIVE
SA TARGET DNA: NEGATIVE
SAO2 % BLDV: 61.3 % (ref 70–75)
SODIUM SERPL-SCNC: 141 MMOL/L (ref 135–145)
SPECIMEN TYPE: NORMAL
SYSTOLIC BLOOD PRESSURE - MUSE: NORMAL MMHG
SYSTOLIC BLOOD PRESSURE - MUSE: NORMAL MMHG
T AXIS - MUSE: 32 DEGREES
T AXIS - MUSE: 47 DEGREES
VENTRICULAR RATE- MUSE: 88 BPM
VENTRICULAR RATE- MUSE: 97 BPM
WBC # BLD AUTO: 11.4 10E3/UL (ref 4–11)

## 2025-03-01 PROCEDURE — 85041 AUTOMATED RBC COUNT: CPT

## 2025-03-01 PROCEDURE — 80053 COMPREHEN METABOLIC PANEL: CPT

## 2025-03-01 PROCEDURE — 85004 AUTOMATED DIFF WBC COUNT: CPT

## 2025-03-01 PROCEDURE — 999N000040 HC STATISTIC CONSULT NO CHARGE VASC ACCESS

## 2025-03-01 PROCEDURE — 999N000157 HC STATISTIC RCP TIME EA 10 MIN

## 2025-03-01 PROCEDURE — 99233 SBSQ HOSP IP/OBS HIGH 50: CPT | Performed by: INTERNAL MEDICINE

## 2025-03-01 PROCEDURE — 71250 CT THORAX DX C-: CPT | Mod: 26 | Performed by: RADIOLOGY

## 2025-03-01 PROCEDURE — 999N000215 HC STATISTIC HFNC ADULT NON-CPAP

## 2025-03-01 PROCEDURE — 250N000011 HC RX IP 250 OP 636

## 2025-03-01 PROCEDURE — 83605 ASSAY OF LACTIC ACID: CPT

## 2025-03-01 PROCEDURE — 258N000003 HC RX IP 258 OP 636

## 2025-03-01 PROCEDURE — 999N000285 HC STATISTIC VASC ACCESS LAB DRAW WITH PIV START

## 2025-03-01 PROCEDURE — 250N000009 HC RX 250

## 2025-03-01 PROCEDURE — 82805 BLOOD GASES W/O2 SATURATION: CPT

## 2025-03-01 PROCEDURE — 250N000013 HC RX MED GY IP 250 OP 250 PS 637

## 2025-03-01 PROCEDURE — 250N000009 HC RX 250: Performed by: INTERNAL MEDICINE

## 2025-03-01 PROCEDURE — 87070 CULTURE OTHR SPECIMN AEROBIC: CPT

## 2025-03-01 PROCEDURE — 94640 AIRWAY INHALATION TREATMENT: CPT | Mod: 76

## 2025-03-01 PROCEDURE — 36415 COLL VENOUS BLD VENIPUNCTURE: CPT | Performed by: INTERNAL MEDICINE

## 2025-03-01 PROCEDURE — 84132 ASSAY OF SERUM POTASSIUM: CPT | Performed by: INTERNAL MEDICINE

## 2025-03-01 PROCEDURE — 84155 ASSAY OF PROTEIN SERUM: CPT

## 2025-03-01 PROCEDURE — 84132 ASSAY OF SERUM POTASSIUM: CPT

## 2025-03-01 PROCEDURE — 250N000012 HC RX MED GY IP 250 OP 636 PS 637

## 2025-03-01 PROCEDURE — 250N000013 HC RX MED GY IP 250 OP 250 PS 637: Performed by: INTERNAL MEDICINE

## 2025-03-01 PROCEDURE — 87899 AGENT NOS ASSAY W/OPTIC: CPT

## 2025-03-01 PROCEDURE — 999N000204 HC STATISTICAL VASC ACCESS NURSE TIME, 31-45 MINUTES

## 2025-03-01 PROCEDURE — 71250 CT THORAX DX C-: CPT

## 2025-03-01 PROCEDURE — 999N000127 HC STATISTIC PERIPHERAL IV START W US GUIDANCE

## 2025-03-01 PROCEDURE — 82565 ASSAY OF CREATININE: CPT

## 2025-03-01 PROCEDURE — 272N000054 HC CANNULA HIGH FLOW, ADULT

## 2025-03-01 PROCEDURE — 999N000007 HC SITE CHECK

## 2025-03-01 PROCEDURE — 36415 COLL VENOUS BLD VENIPUNCTURE: CPT

## 2025-03-01 PROCEDURE — 94640 AIRWAY INHALATION TREATMENT: CPT

## 2025-03-01 PROCEDURE — 250N000011 HC RX IP 250 OP 636: Performed by: INTERNAL MEDICINE

## 2025-03-01 PROCEDURE — 272N000202 HC AEROBIKA WITH MANOMETER

## 2025-03-01 PROCEDURE — 87641 MR-STAPH DNA AMP PROBE: CPT

## 2025-03-01 PROCEDURE — 120N000003 HC R&B IMCU UMMC

## 2025-03-01 PROCEDURE — 94799 UNLISTED PULMONARY SVC/PX: CPT

## 2025-03-01 RX ORDER — DEXTROSE MONOHYDRATE 25 G/50ML
25-50 INJECTION, SOLUTION INTRAVENOUS
Status: DISCONTINUED | OUTPATIENT
Start: 2025-03-01 | End: 2025-03-12 | Stop reason: HOSPADM

## 2025-03-01 RX ORDER — FLUTICASONE FUROATE AND VILANTEROL 200; 25 UG/1; UG/1
1 POWDER RESPIRATORY (INHALATION) DAILY
Status: DISCONTINUED | OUTPATIENT
Start: 2025-03-01 | End: 2025-03-12 | Stop reason: HOSPADM

## 2025-03-01 RX ORDER — THIAMINE HYDROCHLORIDE 100 MG/ML
250 INJECTION, SOLUTION INTRAMUSCULAR; INTRAVENOUS DAILY
Status: COMPLETED | OUTPATIENT
Start: 2025-03-01 | End: 2025-03-03

## 2025-03-01 RX ORDER — ATOVAQUONE 750 MG/5ML
750 SUSPENSION ORAL DAILY
Status: DISCONTINUED | OUTPATIENT
Start: 2025-03-01 | End: 2025-03-01

## 2025-03-01 RX ORDER — LIDOCAINE 40 MG/G
CREAM TOPICAL
Status: DISCONTINUED | OUTPATIENT
Start: 2025-03-01 | End: 2025-03-12 | Stop reason: HOSPADM

## 2025-03-01 RX ORDER — PIPERACILLIN SODIUM, TAZOBACTAM SODIUM 4; .5 G/20ML; G/20ML
4.5 INJECTION, POWDER, LYOPHILIZED, FOR SOLUTION INTRAVENOUS EVERY 6 HOURS
Status: DISCONTINUED | OUTPATIENT
Start: 2025-03-01 | End: 2025-03-03

## 2025-03-01 RX ORDER — METHYLPREDNISOLONE SODIUM SUCCINATE 125 MG/2ML
60 INJECTION INTRAMUSCULAR; INTRAVENOUS EVERY 8 HOURS
Status: DISCONTINUED | OUTPATIENT
Start: 2025-03-01 | End: 2025-03-03

## 2025-03-01 RX ORDER — ATOVAQUONE 750 MG/5ML
750 SUSPENSION ORAL DAILY
Status: DISCONTINUED | OUTPATIENT
Start: 2025-03-02 | End: 2025-03-02 | Stop reason: DRUGHIGH

## 2025-03-01 RX ORDER — IPRATROPIUM BROMIDE AND ALBUTEROL SULFATE 2.5; .5 MG/3ML; MG/3ML
3 SOLUTION RESPIRATORY (INHALATION)
Status: DISCONTINUED | OUTPATIENT
Start: 2025-03-01 | End: 2025-03-11

## 2025-03-01 RX ORDER — NICOTINE POLACRILEX 4 MG
15-30 LOZENGE BUCCAL
Status: DISCONTINUED | OUTPATIENT
Start: 2025-03-01 | End: 2025-03-12 | Stop reason: HOSPADM

## 2025-03-01 RX ADMIN — MORPHINE SULFATE 5 MG: 20 SOLUTION ORAL at 01:51

## 2025-03-01 RX ADMIN — MORPHINE SULFATE 7.5 MG: 20 SOLUTION ORAL at 23:33

## 2025-03-01 RX ADMIN — GUAIFENESIN 600 MG: 600 TABLET, EXTENDED RELEASE ORAL at 08:49

## 2025-03-01 RX ADMIN — PREDNISONE 40 MG: 20 TABLET ORAL at 08:50

## 2025-03-01 RX ADMIN — METHYLPREDNISOLONE SODIUM SUCCINATE 62.5 MG: 125 INJECTION, POWDER, FOR SOLUTION INTRAMUSCULAR; INTRAVENOUS at 22:52

## 2025-03-01 RX ADMIN — IPRATROPIUM BROMIDE AND ALBUTEROL SULFATE 3 ML: .5; 3 SOLUTION RESPIRATORY (INHALATION) at 16:34

## 2025-03-01 RX ADMIN — POLYETHYLENE GLYCOL 3350 17 G: 17 POWDER, FOR SOLUTION ORAL at 08:50

## 2025-03-01 RX ADMIN — PIPERACILLIN AND TAZOBACTAM 4.5 G: 4; .5 INJECTION, POWDER, LYOPHILIZED, FOR SOLUTION INTRAVENOUS at 22:52

## 2025-03-01 RX ADMIN — GABAPENTIN 400 MG: 300 CAPSULE ORAL at 19:50

## 2025-03-01 RX ADMIN — Medication 0.25 MG: at 01:52

## 2025-03-01 RX ADMIN — GUAIFENESIN 600 MG: 600 TABLET, EXTENDED RELEASE ORAL at 19:50

## 2025-03-01 RX ADMIN — PANTOPRAZOLE SODIUM 40 MG: 40 TABLET, DELAYED RELEASE ORAL at 08:50

## 2025-03-01 RX ADMIN — PIPERACILLIN AND TAZOBACTAM 4.5 G: 4; .5 INJECTION, POWDER, LYOPHILIZED, FOR SOLUTION INTRAVENOUS at 11:14

## 2025-03-01 RX ADMIN — PANTOPRAZOLE SODIUM 40 MG: 40 TABLET, DELAYED RELEASE ORAL at 16:45

## 2025-03-01 RX ADMIN — PIPERACILLIN AND TAZOBACTAM 4.5 G: 4; .5 INJECTION, POWDER, LYOPHILIZED, FOR SOLUTION INTRAVENOUS at 17:18

## 2025-03-01 RX ADMIN — AMLODIPINE BESYLATE 5 MG: 5 TABLET ORAL at 22:55

## 2025-03-01 RX ADMIN — AZITHROMYCIN MONOHYDRATE 500 MG: 500 INJECTION, POWDER, LYOPHILIZED, FOR SOLUTION INTRAVENOUS at 23:24

## 2025-03-01 RX ADMIN — Medication 0.25 MG: at 16:45

## 2025-03-01 RX ADMIN — METHOCARBAMOL 750 MG: 750 TABLET ORAL at 19:55

## 2025-03-01 RX ADMIN — GABAPENTIN 400 MG: 300 CAPSULE ORAL at 14:47

## 2025-03-01 RX ADMIN — IPRATROPIUM BROMIDE AND ALBUTEROL SULFATE 3 ML: .5; 3 SOLUTION RESPIRATORY (INHALATION) at 20:18

## 2025-03-01 RX ADMIN — IPRATROPIUM BROMIDE AND ALBUTEROL SULFATE 3 ML: .5; 3 SOLUTION RESPIRATORY (INHALATION) at 09:07

## 2025-03-01 RX ADMIN — IPRATROPIUM BROMIDE AND ALBUTEROL SULFATE 3 ML: .5; 3 SOLUTION RESPIRATORY (INHALATION) at 12:44

## 2025-03-01 RX ADMIN — MORPHINE SULFATE 7.5 MG: 20 SOLUTION ORAL at 14:47

## 2025-03-01 RX ADMIN — MORPHINE SULFATE 7.5 MG: 20 SOLUTION ORAL at 06:25

## 2025-03-01 RX ADMIN — GABAPENTIN 400 MG: 300 CAPSULE ORAL at 08:50

## 2025-03-01 RX ADMIN — GUAIFENESIN 600 MG: 600 TABLET, EXTENDED RELEASE ORAL at 00:48

## 2025-03-01 RX ADMIN — FUROSEMIDE 20 MG: 20 TABLET ORAL at 16:45

## 2025-03-01 RX ADMIN — MORPHINE SULFATE 7.5 MG: 20 SOLUTION ORAL at 11:14

## 2025-03-01 RX ADMIN — IPRATROPIUM BROMIDE AND ALBUTEROL SULFATE 3 ML: .5; 3 SOLUTION RESPIRATORY (INHALATION) at 18:21

## 2025-03-01 RX ADMIN — THIAMINE HYDROCHLORIDE 250 MG: 100 INJECTION, SOLUTION INTRAMUSCULAR; INTRAVENOUS at 11:22

## 2025-03-01 RX ADMIN — MORPHINE SULFATE 7.5 MG: 20 SOLUTION ORAL at 17:31

## 2025-03-01 RX ADMIN — Medication 25 MCG: at 08:49

## 2025-03-01 RX ADMIN — METHYLPREDNISOLONE SODIUM SUCCINATE 62.5 MG: 125 INJECTION, POWDER, FOR SOLUTION INTRAMUSCULAR; INTRAVENOUS at 14:48

## 2025-03-01 RX ADMIN — METHOCARBAMOL 750 MG: 750 TABLET ORAL at 06:24

## 2025-03-01 RX ADMIN — FUROSEMIDE 20 MG: 20 TABLET ORAL at 08:50

## 2025-03-01 RX ADMIN — LEVOTHYROXINE SODIUM 125 MCG: 125 TABLET ORAL at 08:50

## 2025-03-01 RX ADMIN — SODIUM CHLORIDE, POTASSIUM CHLORIDE, SODIUM LACTATE AND CALCIUM CHLORIDE 500 ML: 600; 310; 30; 20 INJECTION, SOLUTION INTRAVENOUS at 00:48

## 2025-03-01 ASSESSMENT — ACTIVITIES OF DAILY LIVING (ADL)
ADLS_ACUITY_SCORE: 57
ADLS_ACUITY_SCORE: 56
ADLS_ACUITY_SCORE: 57
ADLS_ACUITY_SCORE: 56
ADLS_ACUITY_SCORE: 56
ADLS_ACUITY_SCORE: 58
ADLS_ACUITY_SCORE: 62
ADLS_ACUITY_SCORE: 58
ADLS_ACUITY_SCORE: 56
ADLS_ACUITY_SCORE: 56
ADLS_ACUITY_SCORE: 57
ADLS_ACUITY_SCORE: 56
ADLS_ACUITY_SCORE: 58
ADLS_ACUITY_SCORE: 56
ADLS_ACUITY_SCORE: 58
ADLS_ACUITY_SCORE: 56
ADLS_ACUITY_SCORE: 57
ADLS_ACUITY_SCORE: 56
ADLS_ACUITY_SCORE: 56
ADLS_ACUITY_SCORE: 57

## 2025-03-01 NOTE — PLAN OF CARE
Goal Outcome Evaluation:      Plan of Care Reviewed With: patient, child          Outcome Evaluation: discharge plan: return to [facility]

## 2025-03-01 NOTE — PROGRESS NOTES
[facility]    Medicine Progress Note - Hospitalist Service, GOLD TEAM 19    Date of Admission:  2/28/2025    Assessment & Plan   Harrison Thomas is a 79 year old male admitted on 2/28/2025. He has a history of thyroid cancer with lung metastases, Parkinson's disease, neuropathy, glaucoma, osteoporosis, chronic pain, severe COPD, hyperlipidemia, obstructive sleep apnea and coronary artery disease and is admitted for acute on chronic hypoxic and hypercapnic respiratory failure with increased O2 use requiring HFNC and c/f CAP as potential underlying etiology. Pt admitted for further mgmt and work up, stable at time of admission.     Addendum:   -Plan to obtain repeat CT chest without IV contrast  -Hyperkalemia likely iatrogenic from Bactrim.  Patient has had multiple course of high-dose steroids and will likely need PJP prophylaxis; hence, we will start patient on PJP prophylaxis with atovaquone.     # Acute on chronic hypoxic respiratory failure without hypercarbia with exacerbation of COPD coupled with underlying community-acquired pneumonia.  # H/o severe COPD   Pt with history of multiple pulmonary comorbidities including lung mets from thyroid cancer, severe COPD, and with known BRENNEN with recent hospitalization in January for sepsis 2/2 CAP c/b COPD exacerbation. Ddx tonight including CAP given septic picture vs aspiration induced vs PE vs ACS vs HF exacerbation vs viral etiology for AHHRF vs lack of medication adherence vs more sinister including PTX considered.   - Admitted for acute on chronic hypoxic respiratory failure; etiology unclear  - Given concern for underlying CAP, started on CTX, but broaden now to Zosyn given concern for HAP plus Azithromycin   - Continue steroid with IV Solumedrol 62.5 mg q8h   - Duoneb QID with RT     # Hyperkalemia - etiology unclear; check K+ x2 again q4h     # Elevated lactic acid - likely in the setting of hypoxia. HDS, no  acidosis on VBG.  Advised against routine lactic acid checks.       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 Patient had received radioactive iodine in the past in addition to RT to the lung. 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 TLC noted to be decreased to 74% of predicted at that visit iso his severe COPD and scarring from radiation treatment and volume loss.   -- PTA levothyroxine 112 mcg daily.      # Acute on chronic thrombocytopenia - likely in the setting of acute illness   Unclear etiology. During last hospitalization, thought to be in the setting of infection. Not on heparin, HIT unlikely. PBS with normal platelet morphology 1/29/25, just significantly decreased in number. DDx including medication induced vs immune mediated vs underproduction vs splenic sequestration vs consumption considered. No valorie bleeding and decreased concern for PE on admission tonight making consumptive process unlikely. Hgb stable as well.   -- Holding ASA at admission and only ordering SCDs  -- AM CBC ordered, recommend further eval as warranted by primary team. Currently stable without active bleeding.      # BRENNEN   Refused BiPAP here, unclear if CPAP at home. HFNC overnight, CTM.      # CAD   # HLD   # HTN   # H/o SAH 2023   -- Resume PTA amlodipine 5mg at bedtime, normotensive here   -- Hold PTA rosuvastatin 10mg daily   -- Hold PTA aspirin given thrombocytopenia   -- Continue PTA lasix 40 mg BID      # Parkinsons disease   Has seen neurology and on sinemet. Patient endorses Left side is weaker than the right. On physical exam, mild rigidity in upper extremities, left more than right. Able to bring both knees to chest without pain in bilateral hips. Gross motor and strength intact.   -Continue PTA sinemet  TID      # Chronic pain   # Low back pain   # Neuropathy   Continue PTA chronic pain medications including Gabapentin, Morphine sulfate, APAP prn.           Diet: Combination Diet Regular Diet Adult    DVT Prophylaxis: Pneumatic Compression Devices  Imr Catheter: Not present  Lines: None     Cardiac Monitoring: None  Code Status: No CPR- Do NOT Intubate      Clinically Significant Risk Factors Present on Admission        # Hyperkalemia: Highest K = 6.1 mmol/L in last 2 days, will monitor as appropriate    # Hypocalcemia: Lowest Ca = 8.3 mg/dL in last 2 days, will monitor and replace as appropriate     # Hypoalbuminemia: Lowest albumin = 3.4 g/dL at 3/1/2025  6:21 AM, will monitor as appropriate   # Drug Induced Platelet Defect: home medication list includes an antiplatelet medication   # Hypertension: Noted on problem list               # Financial/Environmental Concerns:           Social Drivers of Health    Tobacco Use: Medium Risk (2/28/2025)    Patient History     Smoking Tobacco Use: Former     Smokeless Tobacco Use: Never   Physical Activity: Inactive (10/14/2024)    Exercise Vital Sign     Days of Exercise per Week: 0 days     Minutes of Exercise per Session: 0 min   Stress: Stress Concern Present (10/14/2024)    [employer] - Occupational Stress Questionnaire     Feeling of Stress : Very much   Social Connections: Unknown (10/14/2024)    Social Connection and Isolation Panel [NHANES]     Frequency of Social Gatherings with Friends and Family: More than three times a week          Disposition Plan     Medically Ready for Discharge: Anticipated in 2-4 Days             LUCIANA LOWE MD  Hospitalist Service, GOLD TEAM 19  [facility]  Securely message with [email or web address] (more info)  Text page via [employer] Paging/Directory   See signed in provider for up to date coverage  information  ______________________________________________________________________    Interval History   Transferred from the [place] for ongoing management of COPD exacerbation  Continues to require high flow nasal cannula  Blood pressure currently within normal limits  Thrombocytopenia persists, though improving.    Physical Exam   Vital Signs: Temp: 98.9  F (37.2  C) Temp src: Oral BP: (!) 145/79 Pulse: 90   Resp: 18 SpO2: 97 % O2 Device: High Flow Nasal Cannula (HFNC) Oxygen Delivery: 30 LPM  Weight: 160 lbs 11.45 oz    General Appearance: Sitting in bed, on high flow nasal cannula.  HEENT: PERRL: EOMI; moist mucous membrane w/o lesions  Neck: No JVD  Pulmonary: Inspiratory and respiratory wheeze on auscultation, scattered crackles on top of it.  CVS: Regular rhythm, no murmurs, rubs or gallops  GI: BS (+), soft nontender, no rebound or guarding   Extremities: No LE edema   Skin: Diffuse ecchymosis bilateral upper and lower extremity.  Neurologic: A&O x3      Medical Decision Making       55 MINUTES SPENT BY ME on the date of service doing chart review, history, exam, documentation & further activities per the note.      Data   ------------------------- PAST 24 HR DATA REVIEWED -----------------------------------------------    I have personally reviewed the following data over the past 24 hrs:    11.4 (H)  \   12.1 (L)   / 39 (LL)     141 102 19.0 /  123 (H)   5.4 (H) 26 0.93 \     ALT: 43 AST: 36 AP: 80 TBILI: 0.5   ALB: 3.4 (L) TOT PROTEIN: 6.0 (L) LIPASE: N/A     Trop: 27 (H) BNP: 1,174     Procal: 0.19 CRP: N/A Lactic Acid: 4.1 (HH)       INR:  N/A PTT:  N/A   D-dimer:  0.54 (H) Fibrinogen:  N/A       Imaging results reviewed over the past 24 hrs:   Recent Results (from the past 24 hours)   POC US ECHO LIMITED    Impression    Limited Bedside Cardiac Ultrasound, performed and interpreted by me.   Indication: Shortness of Breath.  Parasternal long axis, parasternal short axis, apical 4 chamber, and  subcostal views were acquired.   Very poor windows    Findings:    Very difficult windows. No obvious pulmonary edema.    IMPRESSION: Very difficult windows. No obvious pulmonary edema..       XR Chest Port 1 View    Narrative    EXAM: XR CHEST PORT 1 VIEW  LOCATION: [facility]  DATE: 2/28/2025    INDICATION: Shortness of breath  COMPARISON: Chest x-ray 2/11/2025      Impression    IMPRESSION: Patient is rotated to the right. Shallower inspiration on the current exam. Taking into consideration differences in technique, the patchy left lower lung airspace opacities are unchanged. Mild prominence of the pulmonary vascularity which   may be secondary to the shallow inspiration versus vascular congestion. Cardiac silhouette size is within normal limits. Calcified atherosclerotic changes of the aortic arch.
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
## 2025-03-01 NOTE — CODE/RAPID RESPONSE
Rapid Response Team Note    Assessment   A rapid response was called on Harrison Thomas due to SIRS/Sepsis trigger. This presentation is likely due to patient's increased work of breathing and his inflammatory response to his CPOD exacerbation and community acquired pneumonia.    Arrived to patient's room with patient sitting upright in bed, alert, trachypneic into the 30s, but able to speak in full sentences. His Oxygen saturations were 94-97% on the monitor on HFNC at 30% flow of 35 LPM. LS were reported as diminished with crackles/rhochi on the left. Patient was dry appearing with dry/shiny shins without foot or ankle swelling, mouth also appeared dry.     In discussion with the patient, he has COPD on is on 2 LPM via NC at baseline. He lives in a nursing facility and uses a walker and is mostly able to get around okay while occasionally requiring a wheel chair and assistance. He reports arriving yesterday due to shortness of breath and increased work of breathing. He reports feeling maybe slightly better today from yesterday. Denies chest pain. Denies fever.     In the ED he received a dose of IV Methylprednisone and was started on Daily prednisone. He was started on azithromycin and ceftriaxone for community acquired pneumonia, his MRSA nares was negative. He was started on his home lasix dose of 20 mg Daily this morning.     The patient's lactic acid level has been elevated since arrival, from 2.4 up to 4.1, triggering the sepsis response and protocols. Patient is known to have an active infection and is currently on appropriate antibiotics and steroids given his COPD history. He is  on duonebs, not producing any sputum no indication for mucomyst or hypertonic nebs. Patient has not been hypotensive or extremely hypoxic, lactic likely not due to hypoperfusion or hypoxia. Will check VBG and continue current treatment plan with pulmonary hygiene.       Plan   -  Check VBG  -  Continue Current plan.   -  The  "Internal Medicine primary team was able to be reached and they are in agreement with the above plan.  -  Disposition: The patient will remain on the current unit. We will continue to monitor this patient closely.  -  Reassessment and plan follow-up will be performed by the primary team    Donaldo Dueñas NP  Rapid Response Team JAY  Securely message with [employer]     Medical Decision Making       30 MINUTES SPENT BY ME on the date of service doing chart review, history, exam, documentation & further activities per the note.        Hospital Course   Brief Summary of events leading to rapid response:   Per chart review: \"Harrison Thomas is a 79 year old male admitted on 2/28/2025. He has a history of thyroid cancer with lung metastases, Parkinson's disease, neuropathy, glaucoma, osteoporosis, chronic pain, severe COPD, hyperlipidemia, obstructive sleep apnea and coronary artery disease and is admitted for acute on chronic hypoxic and hypercapnic respiratory failure with increased O2 use requiring HFNC and c/f CAP as potential underlying etiology. Pt admitted for further mgmt and work up, stable at time of admission.\"    Physical Exam   Vital Signs: Temp: 98.9  F (37.2  C) Temp src: Oral BP: (!) 145/79 Pulse: 90   Resp: 18 SpO2: 97 % O2 Device: High Flow Nasal Cannula (HFNC) Oxygen Delivery: 30 LPM  Weight: 160 lbs 11.45 oz    Physical Exam  Vitals reviewed.   HENT:      Head: Normocephalic.      Mouth/Throat:      Mouth: Mucous membranes are dry.   Eyes:      Extraocular Movements: Extraocular movements intact.      Pupils: Pupils are equal, round, and reactive to light.   Cardiovascular:      Pulses: Normal pulses.   Pulmonary:      Effort: Tachypnea and accessory muscle usage present.      Breath sounds: Examination of the left-upper field reveals rhonchi. Examination of the left-middle field reveals rhonchi. Decreased breath sounds and rhonchi present.   Musculoskeletal:         General: Normal range of motion.     "  Cervical back: Normal range of motion.      Right lower leg: No edema.      Left lower leg: No edema.   Skin:     General: Skin is dry.      Capillary Refill: Capillary refill takes less than 2 seconds.      Coloration: Skin is pale.   Neurological:      General: No focal deficit present.      Mental Status: He is alert and oriented to person, place, and time.

## 2025-03-01 NOTE — CONSULTS
Patient has an Long PIV (Extended Dwell) in the right upper arm. Treat like a traditional PIV.  No blood pressures or lab draws above Long PIV line. May draw labs from catheter. Use a tourniquet high above insertion site and the smallest syringe size possible to draw waste and obtain sample. Flush with 5-10ml NS after sample obtained using pulsatile, push-pause method in order to clear line. Please place a 'Nurse to Consult Vascular Access' order if needing assistance.

## 2025-03-01 NOTE — PLAN OF CARE
Goal Outcome Evaluation:      Reason for admission: Hypoxia  Primary team notified of pt arrival.Yes  Admitted from: [facility]  Via: stretcher/EMS  Accompanied by: EMS  Belongings: glasses,mobile phone/(no adaptor),walker  Admission Required Doc Completed: Yes  Mobility Devices Utilized by Patient Provided (i.e. walker, wheelchair, etc.): Yes walker  Teaching: Orientation to unit and call light- call light within reach, use of console, meal times, when to call for the RN, and enforced importance of safety.  IV Access: 2 PIV lines R forearm  Telemetry: Yes  Ht./Wt.: Completed  Code Status verified on armband: Yes  2 RN Skin Assessment Completed with: LAYLA Bartholomew  Suction/Ambu bag/Flowmeter at bedside: Yes    Pt status:     Temp:  [97.5  F (36.4  C)-97.8  F (36.6  C)] 97.5  F (36.4  C)  Pulse:  [] 90  Resp:  [26] 26  BP: (115-160)/(56-84) 129/70  FiO2 (%):  [30 %-35 %] 35 %  SpO2:  [94 %-99 %] 95 %     Arrived at 5AM.Alert,orientated to place and situation,forgetful with date.  LS with crackles/wheezes,worse on R lower base.Placed on HFNC at 30LPM, 35% FIO2.  Reports breathing is not getting any worse but is not getting any better either.Seen by RT.  Skin with scattered bruisings:both forearms/upper forearms,R lateral knee,small bruises on top of feet.  PIV lines to RUE,both patent.  Reports baseline numbness/tingling on base of feet.  Pt was straight cathed at 4am prior to coming to unit.Urinal on bedside.  Reports had a BM yesterday.  Has baseline low back pain.Rating now at 6/10.Morphine solution and robaxin given.Robaxin given with apple sauce,tolerating well.  On regular diet.Requested  ice chips and black coffee,tolerating while sitting upright.  Placed on tele,NSR.Denies chest pain.  Calls to make needs known.  Call light is near reach.  Bed alarm on.
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
## 2025-03-01 NOTE — CONSULTS
Care Management Initial Consult    General Information  Assessment completed with: Patient, VM-chart review, Children, daughter Janey  Type of CM/SW Visit: Initial Assessment    Primary Care Provider verified and updated as needed: Yes   Readmission within the last 30 days: lack of support   Return Category: Exacerbation of disease  Reason for Consult: discharge planning  Advance Care Planning: Advance Care Planning Reviewed: verified with patient, present on chart          Communication Assessment  Patient's communication style: spoken language (English or Bilingual)    Hearing Difficulty or Deaf: no   Wear Glasses or Blind: yes    Cognitive  Cognitive/Neuro/Behavioral: unchanged from my previous assessment                      Living Environment:   People in home: alone     Current living Arrangements: residential facility (LTC)  Name of Facility: [facility]   Able to return to prior arrangements: yes       Family/Social Support:  Care provided by: other (see comments) (facility staff, self)  Provides care for: no one, unable/limited ability to care for self  Marital Status:   Support system: Children, Facility resident(s)/Staff          Description of Support System: Supportive, Involved    Support Assessment: Adequate family and caregiver support    Current Resources:   Patient receiving home care services: Yes  Skilled Home Care Services: Physical Therapy     Community Resources: None  Equipment currently used at home: walker, rolling  Supplies currently used at home: Oxygen Tubing/Supplies    Employment/Financial:  Employment Status: retired        Financial Concerns: none   Referral to Financial Worker: No       Does the patient's insurance plan have a 3 day qualifying hospital stay waiver?  Yes     Which insurance plan 3 day waiver is available? [employer]    Will the waiver be used for post-acute placement? Undetermined at this time    Lifestyle & Psychosocial Needs:  Social  Drivers of Health     Food Insecurity: Low Risk  (1/5/2025)    Food Insecurity     Within the past 12 months, did you worry that your food would run out before you got money to buy more?: No     Within the past 12 months, did the food you bought just not last and you didn t have money to get more?: No   Depression: Not at risk (10/18/2024)    PHQ-2     PHQ-2 Score: 1   Housing Stability: Low Risk  (1/5/2025)    Housing Stability     Do you have housing? : Yes     Are you worried about losing your housing?: No   Tobacco Use: Medium Risk (2/28/2025)    Patient History     Smoking Tobacco Use: Former     Smokeless Tobacco Use: Never     Passive Exposure: Not on file   Financial Resource Strain: Low Risk  (1/5/2025)    Financial Resource Strain     Within the past 12 months, have you or your family members you live with been unable to get utilities (heat, electricity) when it was really needed?: No   Alcohol Use: Not on file   Transportation Needs: Low Risk  (1/5/2025)    Transportation Needs     Within the past 12 months, has lack of transportation kept you from medical appointments, getting your medicines, non-medical meetings or appointments, work, or from getting things that you need?: No   Physical Activity: Inactive (10/14/2024)    Exercise Vital Sign     Days of Exercise per Week: 0 days     Minutes of Exercise per Session: 0 min   Interpersonal Safety: Low Risk  (3/1/2025)    Interpersonal Safety     Do you feel physically and emotionally safe where you currently live?: Yes     Within the past 12 months, have you been hit, slapped, kicked or otherwise physically hurt by someone?: No     Within the past 12 months, have you been humiliated or emotionally abused in other ways by your partner or ex-partner?: No   Stress: Stress Concern Present (10/14/2024)    [employer] - Occupational Stress Questionnaire     Feeling of Stress : Very much   Social Connections: Unknown (10/14/2024)     Social Connection and Isolation Panel [NHANES]     Frequency of Communication with Friends and Family: Not on file     Frequency of Social Gatherings with Friends and Family: More than three times a week     Attends Jehovah's witness Services: Not on file     Active Member of Clubs or Organizations: Not on file     Attends Club or Organization Meetings: Not on file     Marital Status: Not on file   Health Literacy: Not on file       Functional Status:  Prior to admission patient needed assistance:   Dependent ADLs:: Ambulation-walker  Dependent IADLs:: Cleaning, Shopping, Laundry, Meal Preparation, Transportation  Assesssment of Functional Status: Not at baseline with ADL Functioning    Mental Health Status:  Mental Health Status: Current Concern       Chemical Dependency Status:  Chemical Dependency Status: No Current Concerns             Values/Beliefs:  Spiritual, Cultural Beliefs, Jehovah's witness Practices, Values that affect care: no               Discussed  Partnership in Safe Discharge Planning  document with patient/family: No    Additional Information:  Writer met with pt at bed side and introduced self and explain social work role. Writer met with pt in order to complete case management initial assessment and explain the discharge planning process. The pt was not able to fully participate in the assessment due to the amount of oxygen he is on. Writer asked if it was okay to discuss the assessment with his daughter Janey. The pt agreed to have writer reach out to Janey. Pt is currently residing at a  LTC facility called [facility]. Pt was receiving services prior to admission. Janey reports that the pt is receiving home care services for PT but she was not sure of the agency. The pt was also not sure of the agency name. Writer provided the 5 med surg SW phone to call when she gets home to leave a voicemail with the phone number for the PT agency.     At baseline, pt IS NOT independent with IADLS/ADLs.  Pt requires assistance with cleaning, cooking and transportation. The pt uses a rolling walker at baseline. Writer confirmed pt contact information and housing was accurate as of this admission. Pt has a HCD on file and reviewed.  Writer reviewed insurance and PCP information on file and confirmed this is accurate in the chart.     Writer reviewed previous encounter SW notes from January 2025. The pt was discharge to his LTC facility with a hospice referral. Writer spoke with Valerie, a hospice RN with [facility] to determine what the pt discussed with them last week about goals of care. Per a discussion Valerie had last week with the pt, the pt was interested in aggressive cares and was not interested in starting hospice. Valerie shared that the pt was never admitted to [facility] for care. Writer spoke with staff at [facility] and confirmed the pt is able to return to his facility when medically ready for discharge. Writer also noted during chart review that this pt has a federal trial gong on currently and the pt's  was in frequent contact with SW for discharge planning.       Next Steps: SW to follow for discharge planning.       JUANA Farnsworth  3/1/2025       Social Work and Care Management Department       SEARCHABLE in [facility] - search SOCIAL WORK       [facility] (0800 - 1630) Saturday and Sunday     Units: 4A Vocera, 4C Vocera, & 4E Vocera        Units: 5A 4317-0588 Vocera, 5A 9500-4753 Vocera , BMT SW 1 BMT SW 2, BMT SW 3 & BMT SW 4  5C Off Service 5401 - 5416  5C Off Service 3406-4400     Units: 6A Vocera & 6B Vocera      Units: 6C Vocera     Units: 7A Vocera & 7B Vocera      Units: 7C Med Surg 7401 thru 7418 and 7C Med Surg 7502 thru 7521      Unit: [facility] ED Vocera & [facility] Obs Vocera     [facility] ([phone]) Saturday and Sunday      Units: 5 Ortho Vocera, 5 Med Surg Vocera & WB ED Vocera     Units: 6 Med Surg Vocera, 8 Med Surg Vocera, & 10 ICU  Vocera      After hours Vocera [place] and After Hours Vocera [place]     Please NOTE changes to times below:    **Saturday & Sunday (1630 - 2030)    **Mon-Fri (0092-6476)     **[facility] Recognized Holidays  (7459-7321)    Units: ALL   - see above VOCERA links to units

## 2025-03-01 NOTE — PHARMACY-ADMISSION MEDICATION HISTORY
Pharmacist Admission Medication History    Admission medication history is complete. The information provided in this note is only as accurate as the sources available at the time of the update.    Information Source(s): Clinic records, Hospital records, and [facility]    Pertinent Information: Patient was recently discharged on 2/18/24 to his NH. Outpatient provider was following patient and recently made medication changes that were already reflected within the med list. Of note, the nursing home has not been answering the phone to obtain a MAR, but med list was consistent with fill hx, hospital discharge and clinic notes. Therefore, last doses were not entered into the medhx.     Prednisone taper plan from 2/18 discharge:  Prednisone dose was lowered to 30 mg daily on 15-Feb-2025. Plan is for slower taper:  Prednisone 30 mg daily for 1 week  Prednisone 20 mg daily for 1 week  Prednisone 10 mg daily for 1 week  Prednisone 5 mg daily for 1 week  Patient then to resume prednisone 2 mg daily.    Changes made to PTA medication list:  Added: None  Deleted: Melatonin 10 mg (duplicate order, recently started on 5 mg at 2/18 discharge)  Changed: None    Allergies reviewed with patient and updates made in EHR: unable to assess    Medication History Completed By: Noemy Lynn RPH 3/1/2025 1:54 PM    PTA Med List   Medication Sig Last Dose/Taking    acetaminophen (TYLENOL) 325 MG tablet Take 2 tablets (650 mg) by mouth every 4 hours as needed for fever or mild pain. Taking As Needed    amLODIPine (NORVASC) 5 MG tablet Take 1 tablet (5 mg) by mouth at bedtime. Taking    aspirin 81 MG EC tablet Take 81 mg by mouth daily. Taking    azithromycin (ZITHROMAX) 500 MG tablet Take 1 tablet (500 mg) by mouth every other day Taking    calcium carbonate (OS-KARLIE) 1500 (600 Ca) MG tablet Take 600 mg by mouth daily Taking    carbidopa-levodopa (SINEMET)  MG tablet Take 1/2 tablet three times a day for one  week, then increase to 1 tablet three times a day for one week, then increase to 1.5 tablets three times a day and stay at that dose. Taking    carboxymethylcellulose PF (REFRESH PLUS) 0.5 % ophthalmic solution Place 1 drop into both eyes every hour as needed for dry eyes Taking As Needed    Fluticasone-Umeclidin-Vilanterol (TRELEGY ELLIPTA) 200-62.5-25 MCG/ACT oral inhaler Inhale 1 puff into the lungs daily In AM. Rinse mouth after use. Taking    furosemide (LASIX) 20 MG tablet Take 1 tablet (20 mg) by mouth 2 times daily. Taking    gabapentin (NEURONTIN) 400 MG capsule Take 1 capsule (400 mg) by mouth 3 times daily Taking    guaiFENesin (MUCINEX) 600 MG 12 hr tablet Take 1 tablet (600 mg) by mouth 2 times daily. Taking    ipratropium - albuterol 0.5 mg/2.5 mg/3 mL (DUONEB) 0.5-2.5 (3) MG/3ML neb solution Take 1 vial (3 mLs) by nebulization 4 times daily. Taking    ipratropium - albuterol 0.5 mg/2.5 mg/3 mL (DUONEB) 0.5-2.5 (3) MG/3ML neb solution Take 1 vial (3 mLs) by nebulization every 4 hours as needed for wheezing. Taking As Needed    levothyroxine (SYNTHROID/LEVOTHROID) 125 MCG tablet Take 125 mcg by mouth daily. Taking    Lidocaine (LIDOCARE) 4 % Patch Place 1 patch over 12 hours onto the skin every 24 hours. Apply to low back. To prevent lidocaine toxicity, patient should be patch free for 12 hrs daily. Taking    loperamide (IMODIUM) 2 MG capsule Take 1 capsule (2 mg) by mouth 4 times daily as needed for diarrhea. Taking As Needed    LORazepam (ATIVAN) 0.5 MG tablet Take 0.5 tablets (0.25 mg) by mouth every 6 hours as needed for anxiety (dyspnea). Taking As Needed    melatonin 5 MG tablet Take 1 tablet (5 mg) by mouth nightly as needed for sleep. Taking As Needed    methocarbamol (ROBAXIN) 750 MG tablet Take 1 tablet (750 mg) by mouth 4 times daily as needed for muscle spasms. Taking As Needed    morphine sulfate (ROXANOL) 20 mg/mL (HIGH CONC) soln Take 0.375 mLs (7.5 mg) by mouth 5 times daily. Taking     morphine sulfate (ROXANOL) 20 mg/mL (HIGH CONC) soln Take 0.25-0.5 mLs (5-10 mg) by mouth every 2 hours as needed for moderate to severe pain or shortness of breath. Take 5 mg every 2 hours as needed for dyspnea or moderate pain. Take 10 mg every 2 hours as needed for severe pain. Taking As Needed    multivitamin w/minerals (THERA-VIT-M) tablet Take 1 tablet by mouth daily  Taking    naloxone (NARCAN) 4 MG/0.1ML nasal spray Spray 1 spray (4 mg) into one nostril alternating nostrils as needed for opioid reversal. every 2-3 minutes until assistance arrives Taking As Needed    Omega-3 Fatty Acids (FISH OIL MAXIMUM STRENGTH) 1200 MG CPDR Take 1 capsule by mouth. Taking    ondansetron (ZOFRAN ODT) 4 MG ODT tab Take 1 tablet (4 mg) by mouth every 6 hours as needed for nausea or vomiting. Taking As Needed    pantoprazole (PROTONIX) 40 MG EC tablet Take 1 tablet (40 mg) by mouth 2 times daily (before meals). Taking    polyethylene glycol (MIRALAX) 17 GM/Dose powder Take 17 g by mouth daily. Taking    potassium chloride santo ER (KLOR-CON M20) 20 MEQ CR tablet TAKE 1 TABLET BY MOUTH 2 TIMES DAILY Taking    [START ON 3/15/2025] predniSONE (DELTASONE) 1 MG tablet Take 2 tablets (2 mg) by mouth daily. Taking    predniSONE (DELTASONE) 10 MG tablet Take 1 tablet (10 mg) by mouth daily for 7 days. Taking    predniSONE (DELTASONE) 20 MG tablet Take 1 tablet (20 mg) by mouth daily for 7 days. Taking    [START ON 3/8/2025] predniSONE (DELTASONE) 5 MG tablet Take 1 tablet (5 mg) by mouth daily for 7 days. Taking    rosuvastatin (CRESTOR) 10 MG tablet TAKE 1 TABLET BY MOUTH EVERY DAY Taking    senna-docusate (SENOKOT-S/PERICOLACE) 8.6-50 MG tablet Take 1 tablet by mouth 2 times daily as needed for constipation. Taking As Needed    sulfamethoxazole-trimethoprim (BACTRIM) 400-80 MG tablet Take 1 tablet by mouth daily. Taking    Vitamin D3 (CHOLECALCIFEROL) 25 mcg (1000 units) tablet Take 1 tablet (25 mcg) by mouth daily Taking

## 2025-03-01 NOTE — CONSULTS
"Consult received for Vascular access care. MD order placed for midline. After discussion with provider- extended dwell piv and standard piv placement was requested. See LDA for details. For additional needs place \"Nursing to Consult for Vascular Access\" [id number] order in EPIC.  "

## 2025-03-01 NOTE — ED TRIAGE NOTES
Patient from nursing home [facility] via ems with c/o shortness of breath and labored breathing X 3-4 hours. Audible crackles. 6L NC .     Triage Assessment (Adult)       Row Name 02/28/25 2052          Triage Assessment    Airway WDL WDL        Respiratory WDL    Respiratory WDL X;rhythm/pattern     Rhythm/Pattern, Respiratory shortness of breath;tachypneic;labored        Skin Circulation/Temperature WDL    Skin Circulation/Temperature WDL WDL        Cardiac WDL    Cardiac WDL chest pain;X        Peripheral/Neurovascular WDL    Peripheral Neurovascular WDL WDL        Cognitive/Neuro/Behavioral WDL    Cognitive/Neuro/Behavioral WDL WDL

## 2025-03-01 NOTE — PROGRESS NOTES
Code sepsis note:    Code sepsis called for a lactic acid of 4.1. Pt normotensive, increased work of breathing with accessory muscle use. Pt reports shortness of breath and is receiving a nebulizer and working with flutter valve during time of code sepsis. Pt responding to questions appropriately. JAY sepsis team responded and assessed patient. Plan for an x-ray and VBG. Pt in agreement with current plan.

## 2025-03-02 LAB
BACTERIA SPT CULT: ABNORMAL
BASOPHILS # BLD AUTO: 0 10E3/UL (ref 0–0.2)
BASOPHILS NFR BLD AUTO: 0 %
EOSINOPHIL # BLD AUTO: 0 10E3/UL (ref 0–0.7)
EOSINOPHIL NFR BLD AUTO: 0 %
ERYTHROCYTE [DISTWIDTH] IN BLOOD BY AUTOMATED COUNT: 15.9 % (ref 10–15)
GLUCOSE BLDC GLUCOMTR-MCNC: 142 MG/DL (ref 70–99)
GLUCOSE BLDC GLUCOMTR-MCNC: 156 MG/DL (ref 70–99)
GRAM STAIN RESULT: ABNORMAL
HCT VFR BLD AUTO: 33.8 % (ref 40–53)
HGB BLD-MCNC: 10.8 G/DL (ref 13.3–17.7)
HOLD SPECIMEN: NORMAL
IMM GRANULOCYTES # BLD: 0.1 10E3/UL
IMM GRANULOCYTES NFR BLD: 1 %
LYMPHOCYTES # BLD AUTO: 0.3 10E3/UL (ref 0.8–5.3)
LYMPHOCYTES NFR BLD AUTO: 3 %
MCH RBC QN AUTO: 31 PG (ref 26.5–33)
MCHC RBC AUTO-ENTMCNC: 32 G/DL (ref 31.5–36.5)
MCV RBC AUTO: 97 FL (ref 78–100)
MONOCYTES # BLD AUTO: 0.2 10E3/UL (ref 0–1.3)
MONOCYTES NFR BLD AUTO: 3 %
NEUTROPHILS # BLD AUTO: 7 10E3/UL (ref 1.6–8.3)
NEUTROPHILS NFR BLD AUTO: 93 %
NRBC # BLD AUTO: 0 10E3/UL
NRBC BLD AUTO-RTO: 0 /100
PLATELET # BLD AUTO: 52 10E3/UL (ref 150–450)
RBC # BLD AUTO: 3.48 10E6/UL (ref 4.4–5.9)
WBC # BLD AUTO: 7.6 10E3/UL (ref 4–11)

## 2025-03-02 PROCEDURE — 85048 AUTOMATED LEUKOCYTE COUNT: CPT | Performed by: INTERNAL MEDICINE

## 2025-03-02 PROCEDURE — 999N000215 HC STATISTIC HFNC ADULT NON-CPAP

## 2025-03-02 PROCEDURE — 99233 SBSQ HOSP IP/OBS HIGH 50: CPT | Performed by: INTERNAL MEDICINE

## 2025-03-02 PROCEDURE — 250N000013 HC RX MED GY IP 250 OP 250 PS 637

## 2025-03-02 PROCEDURE — 94640 AIRWAY INHALATION TREATMENT: CPT | Mod: 76

## 2025-03-02 PROCEDURE — 36415 COLL VENOUS BLD VENIPUNCTURE: CPT | Performed by: INTERNAL MEDICINE

## 2025-03-02 PROCEDURE — 250N000013 HC RX MED GY IP 250 OP 250 PS 637: Performed by: INTERNAL MEDICINE

## 2025-03-02 PROCEDURE — 250N000011 HC RX IP 250 OP 636

## 2025-03-02 PROCEDURE — 85018 HEMOGLOBIN: CPT | Performed by: INTERNAL MEDICINE

## 2025-03-02 PROCEDURE — 258N000003 HC RX IP 258 OP 636

## 2025-03-02 PROCEDURE — 250N000009 HC RX 250: Performed by: INTERNAL MEDICINE

## 2025-03-02 PROCEDURE — 999N000157 HC STATISTIC RCP TIME EA 10 MIN

## 2025-03-02 PROCEDURE — 85004 AUTOMATED DIFF WBC COUNT: CPT | Performed by: INTERNAL MEDICINE

## 2025-03-02 PROCEDURE — 94640 AIRWAY INHALATION TREATMENT: CPT

## 2025-03-02 PROCEDURE — 120N000003 HC R&B IMCU UMMC

## 2025-03-02 PROCEDURE — 250N000011 HC RX IP 250 OP 636: Performed by: INTERNAL MEDICINE

## 2025-03-02 RX ORDER — LIDOCAINE HYDROCHLORIDE 20 MG/ML
JELLY TOPICAL ONCE
Status: COMPLETED | OUTPATIENT
Start: 2025-03-02 | End: 2025-03-02

## 2025-03-02 RX ORDER — FUROSEMIDE 10 MG/ML
40 INJECTION INTRAMUSCULAR; INTRAVENOUS ONCE
Status: DISCONTINUED | OUTPATIENT
Start: 2025-03-02 | End: 2025-03-02

## 2025-03-02 RX ORDER — FUROSEMIDE 10 MG/ML
40 INJECTION INTRAMUSCULAR; INTRAVENOUS DAILY
Status: DISCONTINUED | OUTPATIENT
Start: 2025-03-02 | End: 2025-03-03

## 2025-03-02 RX ORDER — ATOVAQUONE 750 MG/5ML
1500 SUSPENSION ORAL DAILY
Status: DISCONTINUED | OUTPATIENT
Start: 2025-03-02 | End: 2025-03-12 | Stop reason: HOSPADM

## 2025-03-02 RX ADMIN — AZITHROMYCIN MONOHYDRATE 500 MG: 500 INJECTION, POWDER, LYOPHILIZED, FOR SOLUTION INTRAVENOUS at 22:50

## 2025-03-02 RX ADMIN — MORPHINE SULFATE 5 MG: 20 SOLUTION ORAL at 03:43

## 2025-03-02 RX ADMIN — FLUTICASONE FUROATE AND VILANTEROL TRIFENATATE 1 PUFF: 200; 25 POWDER RESPIRATORY (INHALATION) at 08:20

## 2025-03-02 RX ADMIN — PIPERACILLIN AND TAZOBACTAM 4.5 G: 4; .5 INJECTION, POWDER, LYOPHILIZED, FOR SOLUTION INTRAVENOUS at 15:32

## 2025-03-02 RX ADMIN — PANTOPRAZOLE SODIUM 40 MG: 40 TABLET, DELAYED RELEASE ORAL at 08:21

## 2025-03-02 RX ADMIN — FUROSEMIDE 40 MG: 10 INJECTION, SOLUTION INTRAVENOUS at 15:07

## 2025-03-02 RX ADMIN — MORPHINE SULFATE 7.5 MG: 20 SOLUTION ORAL at 15:29

## 2025-03-02 RX ADMIN — ATOVAQUONE 1500 MG: 750 SUSPENSION ORAL at 15:48

## 2025-03-02 RX ADMIN — GUAIFENESIN 600 MG: 600 TABLET, EXTENDED RELEASE ORAL at 08:25

## 2025-03-02 RX ADMIN — IPRATROPIUM BROMIDE AND ALBUTEROL SULFATE 3 ML: .5; 3 SOLUTION RESPIRATORY (INHALATION) at 03:49

## 2025-03-02 RX ADMIN — PANTOPRAZOLE SODIUM 40 MG: 40 TABLET, DELAYED RELEASE ORAL at 15:48

## 2025-03-02 RX ADMIN — PIPERACILLIN AND TAZOBACTAM 4.5 G: 4; .5 INJECTION, POWDER, LYOPHILIZED, FOR SOLUTION INTRAVENOUS at 21:16

## 2025-03-02 RX ADMIN — IPRATROPIUM BROMIDE AND ALBUTEROL SULFATE 3 ML: .5; 3 SOLUTION RESPIRATORY (INHALATION) at 16:35

## 2025-03-02 RX ADMIN — GABAPENTIN 400 MG: 300 CAPSULE ORAL at 15:08

## 2025-03-02 RX ADMIN — IPRATROPIUM BROMIDE AND ALBUTEROL SULFATE 3 ML: .5; 3 SOLUTION RESPIRATORY (INHALATION) at 21:33

## 2025-03-02 RX ADMIN — MORPHINE SULFATE 7.5 MG: 20 SOLUTION ORAL at 21:11

## 2025-03-02 RX ADMIN — METHYLPREDNISOLONE SODIUM SUCCINATE 62.5 MG: 125 INJECTION, POWDER, FOR SOLUTION INTRAMUSCULAR; INTRAVENOUS at 21:11

## 2025-03-02 RX ADMIN — MORPHINE SULFATE 7.5 MG: 20 SOLUTION ORAL at 10:15

## 2025-03-02 RX ADMIN — ACETAMINOPHEN 650 MG: 325 TABLET, FILM COATED ORAL at 03:42

## 2025-03-02 RX ADMIN — IPRATROPIUM BROMIDE AND ALBUTEROL SULFATE 3 ML: .5; 3 SOLUTION RESPIRATORY (INHALATION) at 07:25

## 2025-03-02 RX ADMIN — GABAPENTIN 400 MG: 300 CAPSULE ORAL at 08:21

## 2025-03-02 RX ADMIN — PIPERACILLIN AND TAZOBACTAM 4.5 G: 4; .5 INJECTION, POWDER, LYOPHILIZED, FOR SOLUTION INTRAVENOUS at 03:32

## 2025-03-02 RX ADMIN — METHYLPREDNISOLONE SODIUM SUCCINATE 62.5 MG: 125 INJECTION, POWDER, FOR SOLUTION INTRAMUSCULAR; INTRAVENOUS at 05:59

## 2025-03-02 RX ADMIN — LEVOTHYROXINE SODIUM 125 MCG: 125 TABLET ORAL at 08:21

## 2025-03-02 RX ADMIN — LIDOCAINE HYDROCHLORIDE: 20 JELLY TOPICAL at 21:13

## 2025-03-02 RX ADMIN — MORPHINE SULFATE 7.5 MG: 20 SOLUTION ORAL at 18:10

## 2025-03-02 RX ADMIN — GABAPENTIN 400 MG: 300 CAPSULE ORAL at 21:11

## 2025-03-02 RX ADMIN — IPRATROPIUM BROMIDE AND ALBUTEROL SULFATE 3 ML: .5; 3 SOLUTION RESPIRATORY (INHALATION) at 00:08

## 2025-03-02 RX ADMIN — PIPERACILLIN AND TAZOBACTAM 4.5 G: 4; .5 INJECTION, POWDER, LYOPHILIZED, FOR SOLUTION INTRAVENOUS at 10:24

## 2025-03-02 RX ADMIN — THIAMINE HYDROCHLORIDE 250 MG: 100 INJECTION, SOLUTION INTRAMUSCULAR; INTRAVENOUS at 08:21

## 2025-03-02 RX ADMIN — GUAIFENESIN 600 MG: 600 TABLET, EXTENDED RELEASE ORAL at 21:12

## 2025-03-02 RX ADMIN — METHYLPREDNISOLONE SODIUM SUCCINATE 62.5 MG: 125 INJECTION, POWDER, FOR SOLUTION INTRAMUSCULAR; INTRAVENOUS at 15:08

## 2025-03-02 RX ADMIN — FUROSEMIDE 20 MG: 20 TABLET ORAL at 08:21

## 2025-03-02 RX ADMIN — METHOCARBAMOL 750 MG: 750 TABLET ORAL at 03:42

## 2025-03-02 RX ADMIN — AMLODIPINE BESYLATE 5 MG: 5 TABLET ORAL at 21:12

## 2025-03-02 RX ADMIN — Medication 25 MCG: at 08:26

## 2025-03-02 RX ADMIN — IPRATROPIUM BROMIDE AND ALBUTEROL SULFATE 3 ML: .5; 3 SOLUTION RESPIRATORY (INHALATION) at 11:31

## 2025-03-02 ASSESSMENT — ACTIVITIES OF DAILY LIVING (ADL)
ADLS_ACUITY_SCORE: 59

## 2025-03-02 NOTE — PROGRESS NOTES
"Patient Name: Jose Miguel  MRN: [id number]  Date of Admission: 2/28/2025  Reason for Admission: COPD  Level of Care: [facility]    Vitals:   BP Readings from Last 1 Encounters:   03/02/25 128/74     Pulse Readings from Last 1 Encounters:   03/02/25 88     Wt Readings from Last 1 Encounters:   03/01/25 72.9 kg (160 lb 11.5 oz)     Ht Readings from Last 1 Encounters:   03/01/25 1.778 m (5' 10\")     Estimated body mass index is 23.06 kg/m  as calculated from the following:    Height as of this encounter: 1.778 m (5' 10\").    Weight as of this encounter: 72.9 kg (160 lb 11.5 oz).  Temp Readings from Last 1 Encounters:   03/02/25 98.1  F (36.7  C) (Oral)       Pain:  Pain Rating 6-7 Effective pain medication/regimen PRN Morphine, Robaxin & Tylenol given    CV Surgery Patient: No    Assessment    Resp: HFNC 40/45  Telemetry: NSR, denies chest discomfort  Neuro: Alert and oriented x4  GI/: abdomen is round, distended. Straight cath x1  Skin/Wounds: Bruises on b/l forearm  Lines/Drains: RT upper arm and lower forearm  Activity: able to stand, uses rolling walker. Assist of 1      Aggression Stop Light: Green          Patient Care Plan:   Focus on Respiratory  Pain Management  "

## 2025-03-02 NOTE — PLAN OF CARE
Patient is alert and oriented.  Has pain in his back.  Is on high flow O2 45 L,FiO2 at 30%.  Tele is on.  Went for CT of chest this afternoon.  Was straight cathed this afternoon.  2 new extended dwell PIVs inserted today.  Daughter was in to visit this morning.  Is having labored breathing with any activity.  Call light is within reach.  Is able to make needs known.  Will continue with plan of care.

## 2025-03-02 NOTE — PROGRESS NOTES
[facility]    Medicine Progress Note - Hospitalist Service, GOLD TEAM 19    Date of Admission:  2/28/2025    Assessment & Plan   Harrison Thomas is a 79 year old male admitted on 2/28/2025. He has a history of thyroid cancer with lung metastases, Parkinson's disease, neuropathy, glaucoma, osteoporosis, chronic pain, severe COPD, hyperlipidemia, obstructive sleep apnea and coronary artery disease and is admitted for acute on chronic hypoxic and hypercapnic respiratory failure with increased O2 use requiring HFNC and c/f CAP as potential underlying etiology. Pt admitted for further mgmt and work up, stable at time of admission.        # Acute on chronic hypoxic respiratory failure without hypercarbia with exacerbation of COPD coupled with underlying community-acquired pneumonia.  # H/o severe COPD   Pt with history of multiple pulmonary comorbidities including lung mets from thyroid cancer, severe COPD, and with known BRENNEN with recent hospitalization in January for sepsis 2/2 CAP c/b COPD exacerbation. Ddx tonight including CAP given septic picture vs aspiration induced vs PE vs ACS vs HF exacerbation vs viral etiology for AHHRF vs lack of medication adherence vs more sinister including PTX considered.   - Admitted for acute on chronic hypoxic respiratory failure; etiology unclear  - Given concern for underlying CAP, started on CTX, but broaden now to Zosyn given concern for HAP plus Azithromycin   - Continue steroid with IV Solumedrol 62.5 mg q8h   -  Plan to start atovaquone daily for PJP prophylaxis   - Duoneb QID with RT   -Give IV Lasix 40 mg x every day for now.    # Hyperkalemia - etiology unclear; check K+ x2 again q4h.  This has resolved this is improving    # Elevated lactic acid - likely in the setting of hypoxia. HDS, no acidosis on VBG.  Advised against routine lactic acid checks.       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 Patient had received radioactive iodine in the past in addition to RT to the lung. 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 TLC noted to be decreased to 74% of predicted at that visit iso his severe COPD and scarring from radiation treatment and volume loss.   -- PTA levothyroxine 112 mcg daily.      # Acute on chronic thrombocytopenia - likely in the setting of acute illness   Unclear etiology. During last hospitalization, thought to be in the setting of infection. Not on heparin, HIT unlikely. PBS with normal platelet morphology 1/29/25, just significantly decreased in number. DDx including medication induced vs immune mediated vs underproduction vs splenic sequestration vs consumption considered. No valorie bleeding and decreased concern for PE on admission tonight making consumptive process unlikely. Hgb stable as well.   -- Given improvement in thrombocytopenia, resume PTA aspirin.  -- Continue to monitor CBC daily for the time being.     # BRENNEN   Refused BiPAP here, unclear if CPAP at home. HFNC overnight, CTM.      # CAD   # HLD   # HTN   # H/o SAH 2023   -- Resume PTA amlodipine 5mg at bedtime   -- Resume PTA rosuvastatin.  -- PT aspirin resumed.  - Holding p.o. Lasix for IV trial; of note, takes 40 mg oral twice daily daily.  Hence, starting 40 mg IV daily.       # Parkinsons disease   Has seen neurology and on sinemet. Patient endorses Left side is weaker than the right. On physical exam, mild rigidity in upper extremities, left more than right. Able to bring both knees to chest without pain in bilateral hips. Gross motor and strength intact.   -Continue PTA sinemet TID      # Chronic pain   # Low back pain   # Neuropathy   Continue PTA chronic pain medications including Gabapentin, Morphine sulfate, APAP prn.            Diet: Combination Diet Regular Diet Adult    DVT Prophylaxis: Pneumatic Compression Devices  Mir Catheter: Not present  Lines: None     Cardiac Monitoring: None  Code Status: No CPR- Do NOT Intubate      Clinically Significant Risk Factors        # Hyperkalemia: Highest K = 6.1 mmol/L in last 2 days, will monitor as appropriate    # Hypocalcemia: Lowest Ca = 8.3 mg/dL in last 2 days, will monitor and replace as appropriate     # Hypoalbuminemia: Lowest albumin = 3.4 g/dL at 3/1/2025  6:21 AM, will monitor as appropriate   # Thrombocytopenia: Lowest platelets = 34 in last 2 days, will monitor for bleeding   # Hypertension: Noted on problem list                # Financial/Environmental Concerns: none         Social Drivers of Health    Tobacco Use: Medium Risk (2/28/2025)    Patient History     Smoking Tobacco Use: Former     Smokeless Tobacco Use: Never   Physical Activity: Inactive (10/14/2024)    Exercise Vital Sign     Days of Exercise per Week: 0 days     Minutes of Exercise per Session: 0 min   Stress: Stress Concern Present (10/14/2024)    [employer] - Occupational Stress Questionnaire     Feeling of Stress : Very much   Social Connections: Unknown (10/14/2024)    Social Connection and Isolation Panel [NHANES]     Frequency of Social Gatherings with Friends and Family: More than three times a week          Disposition Plan     Medically Ready for Discharge: Anticipated in 2-4 Days        LUCIANA LOWE MD  Hospitalist Service, GOLD TEAM [address] [place] [address]  Securely message with [employer] (more info)  Text page via [employer] Paging/Directory   See signed in provider for up to date coverage information  ______________________________________________________________________    Interval History   Patient remains on high flow nasal cannula  Does not report feeling any worse than it was yesterday  Currently remains on IV Zosyn      Physical Exam    Vital Signs: Temp: 98.4  F (36.9  C) Temp src: Oral BP: 129/68 Pulse: 76   Resp: 18 SpO2: 96 % O2 Device: High Flow Nasal Cannula (HFNC) Oxygen Delivery: 45 LPM  Weight: 160 lbs 11.45 oz    General Appearance: Sitting in bed, on high flow nasal cannula.  HEENT: PERRL: EOMI; moist mucous membrane w/o lesions  Neck: No JVD  Pulmonary: Inspiratory and respiratory wheeze on auscultation, scattered crackles on top of it.  CVS: Regular rhythm, no murmurs, rubs or gallops  GI: BS (+), soft nontender, no rebound or guarding   Extremities: No LE edema   Skin: Diffuse ecchymosis bilateral upper and lower extremity.  Neurologic: A&O x3      Medical Decision Making       55 MINUTES SPENT BY ME on the date of service doing chart review, history, exam, documentation & further activities per the note.      Data   ------------------------- PAST 24 HR DATA REVIEWED -----------------------------------------------    I have personally reviewed the following data over the past 24 hrs:    7.6  \   10.8 (L)   / 52 (L)     N/A N/A N/A /  156 (H)   4.4 N/A N/A \     Procal: N/A CRP: N/A Lactic Acid: 3.4 (H)         Imaging results reviewed over the past 24 hrs:   Recent Results (from the past 24 hours)   CT Chest w/o Contrast    Narrative    EXAM: CT CHEST W/O CONTRAST 3/1/2025 5:08 PM    HISTORY: 79 years Male Acute on chronic hypoxic respiratory failure  without hypercarbia.    COMPARISON: Chest x-ray 2/28/2025, CT 4/21/2023, 2/12/2025    TECHNIQUE: Helical CT imaging of the chest was obtained WITHOUT  contrast. Multiplanar post-processed and MIP reformats were obtained  reviewed.    FINDINGS:    LINES/TUBES: None.    LUNG: Diffuse emphysematous changes. Bilateral bronchiectasis with  peribronchial vascular groundglass opacities. Flattening of the  diaphragms.    Left lower lobe 5 mm pulmonary nodule (4/182) unchanged.    LARGE AIRWAYS: Layering debris within distal trachea and bilateral  mainstem bronchi.    PLEURA: No pneumothorax or  pleural effusion. No pleural mass or  calcification. Unchanged soft tissue density like thickening of the  right minor fissure.    VESSELS: Normal size/configuration of the great vessels. Aortic arch  calcifications.    HEART: Stable heart size. No pericardial effusion. Coronary artery  calcifications/stents.    MEDIASTINUM AND PILOL: Prominent mediastinal lymph nodes.    CHEST WALL: Unremarkable.    UPPER ABDOMEN: Postoperative changes of prior right hepatectomy.  Partially visualized right hyper density in the medial border of the  right kidney (222), better characterized on CT from 4/21/2023.   Similar appearing circumferential thickening of the distal esophagus.    BONES: Mid thoracic anterior wedge compression deformity, unchanged.  Anterior wedge compression deformity of L2, unchanged.      Impression    IMPRESSION:   1. Diffuse moderate emphysematous changes with lower lobe predominant  patchy peribronchial vascular groundglass opacities which may  represent superimposed infectious/inflammatory process, are overall  unchanged when compared to CT from 2/12/2025. Layering debris within  the bilateral bronchi increased in suspicion for aspiration.  2. Additional chronic ancillary findings as dictated in the operative  report.    I have personally reviewed the examination and initial interpretation  and I agree with the findings.    JUAN RUELAS MD         SYSTEM ID:  [id number]

## 2025-03-03 ENCOUNTER — APPOINTMENT (OUTPATIENT)
Dept: CARDIOLOGY | Facility: CLINIC | Age: 80
End: 2025-03-03
Attending: INTERNAL MEDICINE
Payer: MEDICARE

## 2025-03-03 ENCOUNTER — APPOINTMENT (OUTPATIENT)
Dept: ULTRASOUND IMAGING | Facility: CLINIC | Age: 80
End: 2025-03-03
Attending: INTERNAL MEDICINE
Payer: MEDICARE

## 2025-03-03 LAB
ANION GAP SERPL CALCULATED.3IONS-SCNC: 14 MMOL/L (ref 7–15)
BASOPHILS # BLD AUTO: 0 10E3/UL (ref 0–0.2)
BASOPHILS NFR BLD AUTO: 0 %
BUN SERPL-MCNC: 22 MG/DL (ref 8–23)
C PNEUM DNA SPEC QL NAA+PROBE: NOT DETECTED
CALCIUM SERPL-MCNC: 8.3 MG/DL (ref 8.8–10.4)
CHLORIDE SERPL-SCNC: 105 MMOL/L (ref 98–107)
CREAT SERPL-MCNC: 0.88 MG/DL (ref 0.67–1.17)
EGFRCR SERPLBLD CKD-EPI 2021: 89 ML/MIN/1.73M2
EOSINOPHIL # BLD AUTO: 0 10E3/UL (ref 0–0.7)
EOSINOPHIL NFR BLD AUTO: 0 %
ERYTHROCYTE [DISTWIDTH] IN BLOOD BY AUTOMATED COUNT: 15.8 % (ref 10–15)
FLUAV H1 2009 PAND RNA SPEC QL NAA+PROBE: NOT DETECTED
FLUAV H1 RNA SPEC QL NAA+PROBE: NOT DETECTED
FLUAV H3 RNA SPEC QL NAA+PROBE: NOT DETECTED
FLUAV RNA SPEC QL NAA+PROBE: NOT DETECTED
FLUBV RNA SPEC QL NAA+PROBE: NOT DETECTED
GLUCOSE SERPL-MCNC: 169 MG/DL (ref 70–99)
HADV DNA SPEC QL NAA+PROBE: NOT DETECTED
HCO3 SERPL-SCNC: 29 MMOL/L (ref 22–29)
HCOV PNL SPEC NAA+PROBE: DETECTED
HCT VFR BLD AUTO: 35.9 % (ref 40–53)
HGB BLD-MCNC: 11.6 G/DL (ref 13.3–17.7)
HMPV RNA SPEC QL NAA+PROBE: NOT DETECTED
HPIV1 RNA SPEC QL NAA+PROBE: NOT DETECTED
HPIV2 RNA SPEC QL NAA+PROBE: NOT DETECTED
HPIV3 RNA SPEC QL NAA+PROBE: NOT DETECTED
HPIV4 RNA SPEC QL NAA+PROBE: NOT DETECTED
IMM GRANULOCYTES # BLD: 0.1 10E3/UL
IMM GRANULOCYTES NFR BLD: 2 %
LYMPHOCYTES # BLD AUTO: 0.3 10E3/UL (ref 0.8–5.3)
LYMPHOCYTES NFR BLD AUTO: 3 %
M PNEUMO DNA SPEC QL NAA+PROBE: NOT DETECTED
MCH RBC QN AUTO: 31.7 PG (ref 26.5–33)
MCHC RBC AUTO-ENTMCNC: 32.3 G/DL (ref 31.5–36.5)
MCV RBC AUTO: 98 FL (ref 78–100)
MONOCYTES # BLD AUTO: 0.3 10E3/UL (ref 0–1.3)
MONOCYTES NFR BLD AUTO: 4 %
NEUTROPHILS # BLD AUTO: 8.6 10E3/UL (ref 1.6–8.3)
NEUTROPHILS NFR BLD AUTO: 92 %
NRBC # BLD AUTO: 0 10E3/UL
NRBC BLD AUTO-RTO: 0 /100
PLATELET # BLD AUTO: 77 10E3/UL (ref 150–450)
POTASSIUM SERPL-SCNC: 3.3 MMOL/L (ref 3.4–5.3)
RBC # BLD AUTO: 3.66 10E6/UL (ref 4.4–5.9)
RSV RNA SPEC QL NAA+PROBE: NOT DETECTED
RSV RNA SPEC QL NAA+PROBE: NOT DETECTED
RV+EV RNA SPEC QL NAA+PROBE: NOT DETECTED
SODIUM SERPL-SCNC: 148 MMOL/L (ref 135–145)
WBC # BLD AUTO: 9.3 10E3/UL (ref 4–11)

## 2025-03-03 PROCEDURE — 85014 HEMATOCRIT: CPT | Performed by: INTERNAL MEDICINE

## 2025-03-03 PROCEDURE — 250N000013 HC RX MED GY IP 250 OP 250 PS 637: Performed by: INTERNAL MEDICINE

## 2025-03-03 PROCEDURE — 93970 EXTREMITY STUDY: CPT

## 2025-03-03 PROCEDURE — 250N000009 HC RX 250: Performed by: INTERNAL MEDICINE

## 2025-03-03 PROCEDURE — 250N000011 HC RX IP 250 OP 636

## 2025-03-03 PROCEDURE — 999N000157 HC STATISTIC RCP TIME EA 10 MIN

## 2025-03-03 PROCEDURE — 87486 CHLMYD PNEUM DNA AMP PROBE: CPT | Performed by: INTERNAL MEDICINE

## 2025-03-03 PROCEDURE — 93306 TTE W/DOPPLER COMPLETE: CPT | Mod: 26 | Performed by: STUDENT IN AN ORGANIZED HEALTH CARE EDUCATION/TRAINING PROGRAM

## 2025-03-03 PROCEDURE — 255N000002 HC RX 255 OP 636: Performed by: INTERNAL MEDICINE

## 2025-03-03 PROCEDURE — 999N000215 HC STATISTIC HFNC ADULT NON-CPAP

## 2025-03-03 PROCEDURE — 250N000013 HC RX MED GY IP 250 OP 250 PS 637

## 2025-03-03 PROCEDURE — 87581 M.PNEUMON DNA AMP PROBE: CPT | Performed by: INTERNAL MEDICINE

## 2025-03-03 PROCEDURE — 85004 AUTOMATED DIFF WBC COUNT: CPT | Performed by: INTERNAL MEDICINE

## 2025-03-03 PROCEDURE — 999N000208 ECHOCARDIOGRAM COMPLETE

## 2025-03-03 PROCEDURE — 99233 SBSQ HOSP IP/OBS HIGH 50: CPT | Performed by: INTERNAL MEDICINE

## 2025-03-03 PROCEDURE — 250N000013 HC RX MED GY IP 250 OP 250 PS 637: Performed by: PHYSICIAN ASSISTANT

## 2025-03-03 PROCEDURE — 120N000003 HC R&B IMCU UMMC

## 2025-03-03 PROCEDURE — 250N000011 HC RX IP 250 OP 636: Performed by: INTERNAL MEDICINE

## 2025-03-03 PROCEDURE — 80048 BASIC METABOLIC PNL TOTAL CA: CPT | Performed by: INTERNAL MEDICINE

## 2025-03-03 PROCEDURE — 250N000009 HC RX 250

## 2025-03-03 PROCEDURE — 93970 EXTREMITY STUDY: CPT | Mod: 26 | Performed by: RADIOLOGY

## 2025-03-03 PROCEDURE — 94640 AIRWAY INHALATION TREATMENT: CPT | Mod: 76

## 2025-03-03 PROCEDURE — 94640 AIRWAY INHALATION TREATMENT: CPT

## 2025-03-03 PROCEDURE — 36415 COLL VENOUS BLD VENIPUNCTURE: CPT | Performed by: INTERNAL MEDICINE

## 2025-03-03 PROCEDURE — 99222 1ST HOSP IP/OBS MODERATE 55: CPT | Mod: GC | Performed by: INTERNAL MEDICINE

## 2025-03-03 RX ORDER — AZITHROMYCIN 250 MG/1
500 TABLET, FILM COATED ORAL EVERY OTHER DAY
Status: DISCONTINUED | OUTPATIENT
Start: 2025-03-03 | End: 2025-03-12 | Stop reason: HOSPADM

## 2025-03-03 RX ORDER — FUROSEMIDE 20 MG/1
20 TABLET ORAL
Status: DISCONTINUED | OUTPATIENT
Start: 2025-03-04 | End: 2025-03-12 | Stop reason: HOSPADM

## 2025-03-03 RX ORDER — IPRATROPIUM BROMIDE AND ALBUTEROL SULFATE 2.5; .5 MG/3ML; MG/3ML
3 SOLUTION RESPIRATORY (INHALATION) EVERY 4 HOURS PRN
Status: DISCONTINUED | OUTPATIENT
Start: 2025-03-03 | End: 2025-03-12 | Stop reason: HOSPADM

## 2025-03-03 RX ORDER — POLYETHYLENE GLYCOL 3350 17 G/17G
17 POWDER, FOR SOLUTION ORAL 2 TIMES DAILY
Status: DISCONTINUED | OUTPATIENT
Start: 2025-03-03 | End: 2025-03-12 | Stop reason: HOSPADM

## 2025-03-03 RX ORDER — PREDNISONE 20 MG/1
40 TABLET ORAL DAILY
Status: COMPLETED | OUTPATIENT
Start: 2025-03-04 | End: 2025-03-05

## 2025-03-03 RX ORDER — PREDNISONE 20 MG/1
40 TABLET ORAL DAILY
Status: DISCONTINUED | OUTPATIENT
Start: 2025-03-04 | End: 2025-03-03

## 2025-03-03 RX ORDER — CARBOXYMETHYLCELLULOSE SODIUM 5 MG/ML
1 SOLUTION/ DROPS OPHTHALMIC
Status: DISCONTINUED | OUTPATIENT
Start: 2025-03-03 | End: 2025-03-12 | Stop reason: HOSPADM

## 2025-03-03 RX ADMIN — POLYETHYLENE GLYCOL 3350 17 G: 17 POWDER, FOR SOLUTION ORAL at 09:25

## 2025-03-03 RX ADMIN — UMECLIDINIUM 1 PUFF: 62.5 AEROSOL, POWDER ORAL at 09:25

## 2025-03-03 RX ADMIN — MORPHINE SULFATE 7.5 MG: 20 SOLUTION ORAL at 06:11

## 2025-03-03 RX ADMIN — AZITHROMYCIN DIHYDRATE 500 MG: 250 TABLET ORAL at 20:26

## 2025-03-03 RX ADMIN — METHYLPREDNISOLONE SODIUM SUCCINATE 62.5 MG: 125 INJECTION, POWDER, FOR SOLUTION INTRAMUSCULAR; INTRAVENOUS at 14:23

## 2025-03-03 RX ADMIN — MORPHINE SULFATE 7.5 MG: 20 SOLUTION ORAL at 18:07

## 2025-03-03 RX ADMIN — ROSUVASTATIN 10 MG: 10 TABLET, FILM COATED ORAL at 08:29

## 2025-03-03 RX ADMIN — ASPIRIN 81 MG: 81 TABLET ORAL at 08:28

## 2025-03-03 RX ADMIN — POLYETHYLENE GLYCOL 3350 17 G: 17 POWDER, FOR SOLUTION ORAL at 20:26

## 2025-03-03 RX ADMIN — METHOCARBAMOL 750 MG: 750 TABLET ORAL at 01:21

## 2025-03-03 RX ADMIN — MORPHINE SULFATE 10 MG: 20 SOLUTION ORAL at 01:21

## 2025-03-03 RX ADMIN — HUMAN ALBUMIN MICROSPHERES AND PERFLUTREN 6 ML: 10; .22 INJECTION, SOLUTION INTRAVENOUS at 15:09

## 2025-03-03 RX ADMIN — LEVOTHYROXINE SODIUM 125 MCG: 125 TABLET ORAL at 08:29

## 2025-03-03 RX ADMIN — PIPERACILLIN AND TAZOBACTAM 4.5 G: 4; .5 INJECTION, POWDER, LYOPHILIZED, FOR SOLUTION INTRAVENOUS at 18:01

## 2025-03-03 RX ADMIN — PIPERACILLIN AND TAZOBACTAM 4.5 G: 4; .5 INJECTION, POWDER, LYOPHILIZED, FOR SOLUTION INTRAVENOUS at 03:59

## 2025-03-03 RX ADMIN — IPRATROPIUM BROMIDE AND ALBUTEROL SULFATE 3 ML: .5; 3 SOLUTION RESPIRATORY (INHALATION) at 16:04

## 2025-03-03 RX ADMIN — THIAMINE HYDROCHLORIDE 250 MG: 100 INJECTION, SOLUTION INTRAMUSCULAR; INTRAVENOUS at 08:30

## 2025-03-03 RX ADMIN — IPRATROPIUM BROMIDE AND ALBUTEROL SULFATE 3 ML: .5; 3 SOLUTION RESPIRATORY (INHALATION) at 08:37

## 2025-03-03 RX ADMIN — IPRATROPIUM BROMIDE AND ALBUTEROL SULFATE 3 ML: .5; 3 SOLUTION RESPIRATORY (INHALATION) at 12:22

## 2025-03-03 RX ADMIN — AMLODIPINE BESYLATE 5 MG: 5 TABLET ORAL at 21:44

## 2025-03-03 RX ADMIN — MORPHINE SULFATE 7.5 MG: 20 SOLUTION ORAL at 21:44

## 2025-03-03 RX ADMIN — IPRATROPIUM BROMIDE AND ALBUTEROL SULFATE 3 ML: .5; 3 SOLUTION RESPIRATORY (INHALATION) at 20:18

## 2025-03-03 RX ADMIN — FLUTICASONE FUROATE AND VILANTEROL TRIFENATATE 1 PUFF: 200; 25 POWDER RESPIRATORY (INHALATION) at 09:25

## 2025-03-03 RX ADMIN — IPRATROPIUM BROMIDE AND ALBUTEROL SULFATE 3 ML: .5; 3 SOLUTION RESPIRATORY (INHALATION) at 00:29

## 2025-03-03 RX ADMIN — GABAPENTIN 400 MG: 300 CAPSULE ORAL at 14:23

## 2025-03-03 RX ADMIN — GUAIFENESIN 600 MG: 600 TABLET, EXTENDED RELEASE ORAL at 20:26

## 2025-03-03 RX ADMIN — PIPERACILLIN AND TAZOBACTAM 4.5 G: 4; .5 INJECTION, POWDER, LYOPHILIZED, FOR SOLUTION INTRAVENOUS at 09:27

## 2025-03-03 RX ADMIN — GABAPENTIN 400 MG: 300 CAPSULE ORAL at 08:28

## 2025-03-03 RX ADMIN — MORPHINE SULFATE 7.5 MG: 20 SOLUTION ORAL at 10:37

## 2025-03-03 RX ADMIN — METHYLPREDNISOLONE SODIUM SUCCINATE 62.5 MG: 125 INJECTION, POWDER, FOR SOLUTION INTRAMUSCULAR; INTRAVENOUS at 06:11

## 2025-03-03 RX ADMIN — MORPHINE SULFATE 7.5 MG: 20 SOLUTION ORAL at 14:23

## 2025-03-03 RX ADMIN — GUAIFENESIN 600 MG: 600 TABLET, EXTENDED RELEASE ORAL at 08:29

## 2025-03-03 RX ADMIN — FUROSEMIDE 40 MG: 10 INJECTION, SOLUTION INTRAVENOUS at 08:30

## 2025-03-03 RX ADMIN — Medication 25 MCG: at 08:29

## 2025-03-03 RX ADMIN — IPRATROPIUM BROMIDE AND ALBUTEROL SULFATE 3 ML: .5; 3 SOLUTION RESPIRATORY (INHALATION) at 04:15

## 2025-03-03 RX ADMIN — PANTOPRAZOLE SODIUM 40 MG: 40 TABLET, DELAYED RELEASE ORAL at 18:07

## 2025-03-03 RX ADMIN — ATOVAQUONE 1500 MG: 750 SUSPENSION ORAL at 08:30

## 2025-03-03 RX ADMIN — GABAPENTIN 400 MG: 300 CAPSULE ORAL at 20:26

## 2025-03-03 RX ADMIN — PANTOPRAZOLE SODIUM 40 MG: 40 TABLET, DELAYED RELEASE ORAL at 08:29

## 2025-03-03 ASSESSMENT — ACTIVITIES OF DAILY LIVING (ADL)
ADLS_ACUITY_SCORE: 61
ADLS_ACUITY_SCORE: 59
ADLS_ACUITY_SCORE: 59
ADLS_ACUITY_SCORE: 61
ADLS_ACUITY_SCORE: 59
ADLS_ACUITY_SCORE: 61

## 2025-03-03 NOTE — PLAN OF CARE
"/71 (BP Location: Left arm, Patient Position: Semi-Rodriguez's, Cuff Size: Adult Regular)   Pulse 86   Temp 98.3  F (36.8  C) (Oral)   Resp 18   Ht 1.778 m (5' 10\")   Wt 72.9 kg (160 lb 11.5 oz)   SpO2 97%   BMI 23.06 kg/m       Indwelling catheter placed for urinary retention  No acute events overnight. ON 45L HFNC 405 FiO2. NSR on tele, occasionally tachy/SOB with exertion.VSS.  Alert and oriented x 4.   Mir adequately draining. LBM 2/27. Continent of BB.  Assist of 1 with rolling walker   R upper extended dwell + R. PIV both SL  "

## 2025-03-03 NOTE — PROGRESS NOTES
Brief Medicine Note    Paged by RN that patient requiring q 6h straight cath. Order placed for Mir. Day team to follow for etiology and to determine timing of removal.    Emerita Del Rosario PA-C
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
## 2025-03-03 NOTE — PROGRESS NOTES
"CLINICAL NUTRITION SERVICES - ASSESSMENT NOTE    RECOMMENDATIONS FOR MDs/PROVIDERS TO ORDER:  None at this time    Malnutrition Status:    Patient does not meet two of the established criteria necessary for diagnosing malnutrition but is at risk for malnutrition    Registered Dietitian Interventions:  Ensure Enlive BID Vanilla    Future/Additional Recommendations:  Monitor labs, stooling, weight trends, intakes, supplement acceptance     REASON FOR ASSESSMENT  Positive admission nutrition risk screen    RespiratoryHFNC    SUBJECTIVE INFORMATION  Assessed patient in room.    NUTRITION HISTORY  Pt lives with daughter who prepares meals for pt. Pt reports 2-3 meals daily. Pt has hand tremors but not significant enough to impair ability to feed self.       CURRENT NUTRITION ORDERS  Diet: Regular    CURRENT INTAKE/TOLERANCE  Intake/Tolerance: Good 75%-100% per I/Os  Per [facility] meal order review: Pt has been ordering 2-3 full meals per day  Pt has cookies and candy on bedside table.   Added Ensure supplements as noted pt tolerated BID supplements at previous admission.    LABS  Nutrition-relevant labs: Reviewed    MEDICATIONS  Nutrition-relevant medications: Reviewed    ANTHROPOMETRICS  Height: 177.8 cm (5' 10\")  Most Recent Weight: 72.9 kg (160 lb 11.5 oz)  IBW: 75.5 kg  % IBW: 97%  Body mass index is 23.06 kg/m .  BMI (kg/m ): Normal BMI, Desired BMI for individuals over age 65: 23-27  Weight History: No significant weight loss noted.  Wt Readings from Last 15 Encounters:   03/01/25 72.9 kg (160 lb 11.5 oz)   02/24/25 70.2 kg (154 lb 12.2 oz)   02/16/25 70.2 kg (154 lb 12.2 oz)   10/24/24 71.5 kg (157 lb 9.6 oz)   10/18/24 70.8 kg (156 lb)   09/23/24 70.9 kg (156 lb 6 oz)   09/20/24 70.8 kg (156 lb)   07/18/24 73 kg (161 lb)   06/19/24 73 kg (161 lb)   05/30/24 70.8 kg (156 lb)   05/14/24 77.1 kg (170 lb)   05/10/24 77.1 kg (170 lb)   05/08/24 77.5 kg (170 lb 12.8 oz)   05/01/24 78.6 kg (173 lb 3.2 oz)   04/29/24 " 78.7 kg (173 lb 6.4 oz)     Dosing Weight: 72.9 kg, based on Actual Body Weight    ASSESSED NUTRITION NEEDS  Estimated Energy Needs: 1825 - 2190 kcals/day (25 - 30 kcals/kg)  Justification: Maintenance  Estimated Protein Needs: 80 - 102 grams protein/day (1.1 - 1.4 grams of pro/kg)  Justification: Increased needs  Estimated Fluid Needs: 1825 - 2190 mL/day (1 mL/kcal)  Justification: Maintenance    SYSTEM FINDINGS    Skin/wounds: No significant skin impairments noted  GI symptoms: None reported  Last BM: PTA  Bowel regimen: Scheduled    RENAL  No acute concerns    MALNUTRITION  % Intake: Decreased intake does not meet criteria  % Weight Loss: Weight loss does not meet criteria   Subcutaneous Fat Loss: None observed  Muscle Loss: Shoulders (deltoids): Mild and Thigh (quadriceps): Mild  Fluid Accumulation/Edema: None noted  Malnutrition Diagnosis: Patient does not meet two of the established criteria necessary for diagnosing malnutrition but is at risk for malnutrition  Malnutrition Present on Admission: No    NUTRITION DIAGNOSIS  Increased nutrient needs kcals  related to hypercatabolic disease as evidenced by h/o severe COPD    INTERVENTIONS  Medical food supplement therapy    Goals  Patient to consume % of nutritionally adequate meal trays TID, or the equivalent with supplements/snacks.     Monitoring/Evaluation  Progress toward goals will be monitored and evaluated per policy.    Taylor CHA  Clinical Dietitian  [facility] 5B/10A ICU Vocera, Teams, or desk [phone]  Weekend/Holiday Vocera: Weekend Holiday Clinical Dietitian [Multi Site Groups]

## 2025-03-03 NOTE — CONSULTS
Consulted to run a test claim for Atovaquone susp.    Patient has pharmacy benefits through [employer] Part D & [place] Medicaid. Per insurance, the following are not covered and require prior auth under the patient's plans:     Atovaquone susp  This process has been started--please see separate notes linked from Prior Authorization Encounter for details/updates regarding this PA.       Please feel free to contact me with any other test claims, prior authorizations, or insurance questions regarding outpatient medications.     Thanks!      Leanne Boone [facility]  Discharge Pharmacy Liaison  [facility] [place]  Pronouns: She/Her/Hers    Securely message with [employer], Epic Secure Chat, or [employer]  Phone: [phone]  Fax: [phone]  [email or web address]

## 2025-03-03 NOTE — PROGRESS NOTES
Pt is alert&ox4, on HFNC 45L and 40% FiO2.  Pt has labored breathing with activity. Pt c/o numbness and tingling on feet baseline. Has tremors on upper and lower extremity. Pt has bruises on upper extremity.  Straight cath pt twice this shift, and he voided on his own once this shift. Pt is ax1 with rolling walker. Pain managed with scheduled morphine. LBM per pt report is 3 days ago, passing gas.  Has extended dwell on right upper arm and piv on lower right arm SL. On regular diet. P:continue with plan of care

## 2025-03-03 NOTE — PLAN OF CARE
Pt had first BM in some time. NST stated it was large and very firm. Pt was advised to drink more water and to continue take the miralax.    Mir in place dt urine retention    ***    Continue POC.

## 2025-03-03 NOTE — PROGRESS NOTES
[facility]    Medicine Progress Note - Hospitalist Service, GOLD TEAM 19    Date of Admission:  2/28/2025    Assessment & Plan   Harrison Thomas is a 79 year old male admitted on 2/28/2025. He has a history of thyroid cancer with lung metastases, Parkinson's disease, neuropathy, glaucoma, osteoporosis, chronic pain, severe COPD, hyperlipidemia, obstructive sleep apnea and coronary artery disease and is admitted for acute on chronic hypoxic and hypercapnic respiratory failure with increased O2 use requiring HFNC and c/f CAP as potential underlying etiology.  Patient continues to require high flow nasal cannula at this time.       # Acute on chronic hypoxic respiratory failure without hypercarbia with exacerbation of COPD coupled with underlying community-acquired pneumonia.  # H/o severe COPD   Pt with history of multiple pulmonary comorbidities including lung mets from thyroid cancer, severe COPD, and with known BRENNEN with recent hospitalization in January for sepsis 2/2 CAP c/b COPD exacerbation. Ddx tonight including CAP given septic picture vs aspiration induced vs PE vs ACS vs HF exacerbation vs viral etiology for AHHRF vs lack of medication adherence vs more sinister including PTX considered.   - Admitted for acute on chronic hypoxic respiratory failure; etiology unclear  - Given concern for underlying CAP, started on CTX, but broaden now to Zosyn given concern for HAP plus Azithromycin.  Continue Zosyn x 7-day course.  -Keep IV Solumedrol 62.5 mg q8h for now  -  Plan to start atovaquone daily for PJP prophylaxis   - Duoneb QID with RT   -Continue PTA Lasix  -Pulmonology consulted, appreciate recs.  For the time being, check respiratory panel and ultrasound lower extremity to rule out DVT.    # Hyperkalemia - etiology unclear; check K+ x2 again q4h.  This has resolved.    # Elevated lactic acid - likely in the setting of hypoxia. HDS, no acidosis on VBG.  Advised  against routine lactic acid checks unless clinically indicated. No concern for sepsis at this time.        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 Patient had received radioactive iodine in the past in addition to RT to the lung. 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 TLC noted to be decreased to 74% of predicted at that visit iso his severe COPD and scarring from radiation treatment and volume loss.   -- PTA levothyroxine 112 mcg daily.      # Acute on chronic thrombocytopenia - likely in the setting of acute illness   No valorie bleeding and decreased concern for PE on admission tonight making consumptive process unlikely. Hgb stable as well.   -- Given improvement in thrombocytopenia, resume PTA aspirin.  -- Continue to monitor CBC daily for the time being.     # BRENNEN   Refused BiPAP here, unclear if CPAP at home. HFNC overnight, CTM.      # CAD   # HLD   # HTN   # H/o SAH 2023   -- Resume PTA amlodipine 5mg at bedtime   -- Resume PTA rosuvastatin.  -- PT aspirin resumed.  - S/p trial of IV Lasix.  Transition to PTA oral Lasix starting tomorrow.       # Parkinsons disease   Has seen neurology and on sinemet. Patient endorses Left side is weaker than the right. On physical exam, mild rigidity in upper extremities, left more than right. Able to bring both knees to chest without pain in bilateral hips. Gross motor and strength intact.   -Continue PTA sinemet TID      # Chronic pain   # Low back pain   # Neuropathy   Continue PTA chronic pain medications including Gabapentin, Morphine sulfate, APAP prn.           Diet: Combination Diet Regular Diet Adult  Snacks/Supplements Adult: Ensure Enlive; Between Meals    DVT Prophylaxis: Pneumatic Compression Devices  Mir Catheter: Not present  Lines: None      Cardiac Monitoring: None  Code Status: No CPR- Do NOT Intubate      Clinically Significant Risk Factors        # Hypokalemia: Lowest K = 3.3 mmol/L in last 2 days, will replace as needed  # Hypernatremia: Highest Na = 148 mmol/L in last 2 days, will monitor as appropriate       # Hypoalbuminemia: Lowest albumin = 3.4 g/dL at 3/1/2025  6:21 AM, will monitor as appropriate   # Thrombocytopenia: Lowest platelets = 52 in last 2 days, will monitor for bleeding   # Hypertension: Noted on problem list                # Financial/Environmental Concerns: none         Social Drivers of Health    Tobacco Use: Medium Risk (2/28/2025)    Patient History     Smoking Tobacco Use: Former     Smokeless Tobacco Use: Never   Physical Activity: Inactive (10/14/2024)    Exercise Vital Sign     Days of Exercise per Week: 0 days     Minutes of Exercise per Session: 0 min   Stress: Stress Concern Present (10/14/2024)    [employer] - Occupational Stress Questionnaire     Feeling of Stress : Very much   Social Connections: Unknown (10/14/2024)    Social Connection and Isolation Panel [NHANES]     Frequency of Social Gatherings with Friends and Family: More than three times a week          Disposition Plan     Medically Ready for Discharge: Anticipated in 2-4 Days        LUCIANA LOWE MD  Hospitalist Service, GOLD TEAM 19  [facility]  Securely message with [employer] (more info)  Text page via [employer] Paging/Directory   See signed in provider for up to date coverage information  ______________________________________________________________________    Interval History   Afebrile and hemodynamically stable  Remains on 40 L, 40% FiO2      Physical Exam   Vital Signs: Temp: 98.1  F (36.7  C) Temp src: Oral BP: (!) 152/76 Pulse: 110   Resp: 20 SpO2: 97 % O2 Device: High Flow Nasal Cannula (HFNC) Oxygen Delivery: 40 LPM  Weight: 160 lbs 11.45 oz    General Appearance: Sitting  in bed, on high flow nasal cannula.  HEENT: PERRL: EOMI; moist mucous membrane w/o lesions  Neck: No JVD  Pulmonary: Scattered bilateral crackles with expiratory wheezes on auscultation today with poor aeration bilaterally.  CVS: Regular rhythm, no murmurs, rubs or gallops  GI: BS (+), soft nontender, no rebound or guarding   Extremities: No LE edema   Skin: Diffuse ecchymosis bilateral upper and lower extremity.  Neurologic: A&O x3      Medical Decision Making       45 MINUTES SPENT BY ME on the date of service doing chart review, history, exam, documentation & further activities per the note.      Data   ------------------------- PAST 24 HR DATA REVIEWED -----------------------------------------------    I have personally reviewed the following data over the past 24 hrs:    9.3  \   11.6 (L)   / 77 (L)     148 (H) 105 22.0 /  169 (H)   3.3 (L) 29 0.88 \       Imaging results reviewed over the past 24 hrs:   No results found for this or any previous visit (from the past 24 hours).

## 2025-03-04 LAB
G6PD RBC-CCNT: 16 U/G HB
GLUCOSE BLDC GLUCOMTR-MCNC: 150 MG/DL (ref 70–99)

## 2025-03-04 PROCEDURE — 250N000012 HC RX MED GY IP 250 OP 636 PS 637: Performed by: PHYSICIAN ASSISTANT

## 2025-03-04 PROCEDURE — 99232 SBSQ HOSP IP/OBS MODERATE 35: CPT | Performed by: INTERNAL MEDICINE

## 2025-03-04 PROCEDURE — 999N000157 HC STATISTIC RCP TIME EA 10 MIN

## 2025-03-04 PROCEDURE — 250N000013 HC RX MED GY IP 250 OP 250 PS 637: Performed by: INTERNAL MEDICINE

## 2025-03-04 PROCEDURE — 250N000013 HC RX MED GY IP 250 OP 250 PS 637

## 2025-03-04 PROCEDURE — 250N000009 HC RX 250: Performed by: INTERNAL MEDICINE

## 2025-03-04 PROCEDURE — 999N000215 HC STATISTIC HFNC ADULT NON-CPAP

## 2025-03-04 PROCEDURE — 94640 AIRWAY INHALATION TREATMENT: CPT | Mod: 76

## 2025-03-04 PROCEDURE — 94640 AIRWAY INHALATION TREATMENT: CPT

## 2025-03-04 PROCEDURE — 120N000002 HC R&B MED SURG/OB UMMC

## 2025-03-04 RX ORDER — POTASSIUM CHLORIDE 1500 MG/1
40 TABLET, EXTENDED RELEASE ORAL 3 TIMES DAILY
Status: COMPLETED | OUTPATIENT
Start: 2025-03-04 | End: 2025-03-04

## 2025-03-04 RX ORDER — MORPHINE SULFATE 20 MG/ML
7.5 SOLUTION ORAL 3 TIMES DAILY
Status: DISCONTINUED | OUTPATIENT
Start: 2025-03-04 | End: 2025-03-10

## 2025-03-04 RX ADMIN — AMLODIPINE BESYLATE 5 MG: 5 TABLET ORAL at 21:14

## 2025-03-04 RX ADMIN — MORPHINE SULFATE 5 MG: 20 SOLUTION ORAL at 11:47

## 2025-03-04 RX ADMIN — GABAPENTIN 400 MG: 300 CAPSULE ORAL at 19:47

## 2025-03-04 RX ADMIN — GABAPENTIN 400 MG: 300 CAPSULE ORAL at 14:50

## 2025-03-04 RX ADMIN — ROSUVASTATIN 10 MG: 10 TABLET, FILM COATED ORAL at 08:35

## 2025-03-04 RX ADMIN — ATOVAQUONE 1500 MG: 750 SUSPENSION ORAL at 08:38

## 2025-03-04 RX ADMIN — PANTOPRAZOLE SODIUM 40 MG: 40 TABLET, DELAYED RELEASE ORAL at 16:45

## 2025-03-04 RX ADMIN — MORPHINE SULFATE 10 MG: 20 SOLUTION ORAL at 03:34

## 2025-03-04 RX ADMIN — PANTOPRAZOLE SODIUM 40 MG: 40 TABLET, DELAYED RELEASE ORAL at 08:35

## 2025-03-04 RX ADMIN — GUAIFENESIN 600 MG: 600 TABLET, EXTENDED RELEASE ORAL at 08:35

## 2025-03-04 RX ADMIN — IPRATROPIUM BROMIDE AND ALBUTEROL SULFATE 3 ML: .5; 3 SOLUTION RESPIRATORY (INHALATION) at 12:19

## 2025-03-04 RX ADMIN — ASPIRIN 81 MG: 81 TABLET ORAL at 08:38

## 2025-03-04 RX ADMIN — Medication 25 MCG: at 08:35

## 2025-03-04 RX ADMIN — METHOCARBAMOL 750 MG: 750 TABLET ORAL at 05:06

## 2025-03-04 RX ADMIN — LEVOTHYROXINE SODIUM 125 MCG: 125 TABLET ORAL at 08:35

## 2025-03-04 RX ADMIN — GABAPENTIN 400 MG: 300 CAPSULE ORAL at 10:42

## 2025-03-04 RX ADMIN — POTASSIUM CHLORIDE 40 MEQ: 1500 TABLET, EXTENDED RELEASE ORAL at 10:47

## 2025-03-04 RX ADMIN — IPRATROPIUM BROMIDE AND ALBUTEROL SULFATE 3 ML: .5; 3 SOLUTION RESPIRATORY (INHALATION) at 20:08

## 2025-03-04 RX ADMIN — GUAIFENESIN 600 MG: 600 TABLET, EXTENDED RELEASE ORAL at 19:47

## 2025-03-04 RX ADMIN — THIAMINE HCL TAB 100 MG 100 MG: 100 TAB at 08:35

## 2025-03-04 RX ADMIN — FUROSEMIDE 20 MG: 20 TABLET ORAL at 08:35

## 2025-03-04 RX ADMIN — PREDNISONE 40 MG: 20 TABLET ORAL at 08:35

## 2025-03-04 RX ADMIN — MORPHINE SULFATE 7.5 MG: 20 SOLUTION ORAL at 06:25

## 2025-03-04 RX ADMIN — IPRATROPIUM BROMIDE AND ALBUTEROL SULFATE 3 ML: .5; 3 SOLUTION RESPIRATORY (INHALATION) at 07:53

## 2025-03-04 RX ADMIN — IPRATROPIUM BROMIDE AND ALBUTEROL SULFATE 3 ML: .5; 3 SOLUTION RESPIRATORY (INHALATION) at 04:20

## 2025-03-04 RX ADMIN — IPRATROPIUM BROMIDE AND ALBUTEROL SULFATE 3 ML: .5; 3 SOLUTION RESPIRATORY (INHALATION) at 17:05

## 2025-03-04 RX ADMIN — IPRATROPIUM BROMIDE AND ALBUTEROL SULFATE 3 ML: .5; 3 SOLUTION RESPIRATORY (INHALATION) at 00:15

## 2025-03-04 RX ADMIN — MORPHINE SULFATE 7.5 MG: 20 SOLUTION ORAL at 14:51

## 2025-03-04 RX ADMIN — POTASSIUM CHLORIDE 40 MEQ: 1500 TABLET, EXTENDED RELEASE ORAL at 14:51

## 2025-03-04 RX ADMIN — FUROSEMIDE 20 MG: 20 TABLET ORAL at 16:45

## 2025-03-04 RX ADMIN — IPRATROPIUM BROMIDE AND ALBUTEROL SULFATE 3 ML: .5; 3 SOLUTION RESPIRATORY (INHALATION) at 17:47

## 2025-03-04 RX ADMIN — POLYETHYLENE GLYCOL 3350 17 G: 17 POWDER, FOR SOLUTION ORAL at 08:35

## 2025-03-04 RX ADMIN — MORPHINE SULFATE 7.5 MG: 20 SOLUTION ORAL at 19:50

## 2025-03-04 RX ADMIN — POTASSIUM CHLORIDE 40 MEQ: 1500 TABLET, EXTENDED RELEASE ORAL at 19:47

## 2025-03-04 ASSESSMENT — ACTIVITIES OF DAILY LIVING (ADL)
ADLS_ACUITY_SCORE: 61
ADLS_ACUITY_SCORE: 62
ADLS_ACUITY_SCORE: 61
ADLS_ACUITY_SCORE: 62
ADLS_ACUITY_SCORE: 61

## 2025-03-04 NOTE — PLAN OF CARE
"Goal Outcome Evaluation:       Vitally stable, BP (!) 142/81   Pulse 96   Temp 98.1  F (36.7  C) (Oral)   Resp 17   Ht 1.778 m (5' 10\")   Wt 72.9 kg (160 lb 11.5 oz)   SpO2 98%   BMI 23.06 kg/m      Alert and oriented X4, but intermittently forgetful.    Right Piv saline locked.    Pain managed with robaxin and morphine.     Catheter in place.     On high flow nasal cannula (40 ml/min and 42%).    Not out of bed.     Covid positive , on droplet precaution.     Doctor requested for patient's oxygen to be taper down oxygen, respiratory therapist informed. RT conformed to come taper patient down.     Continue plan of care.                          "

## 2025-03-04 NOTE — PROGRESS NOTES
Brief Medicine Cross Cover Note    Following up on pulmonology recommendations on sign out see note on 3/3 for full details, who recommended:   - De-escalating abx with low concern for acute infectious procress with low procal and low white count. Discontinued zosyn. Patient already completed 3 days of azithromycin 500 mg daily, but on chronic azithromycin. Resumed home dosing of 500 mg every other day.   - Pulm said no need to continue  home inhalers while on scheduled nebs. Holding home Breo and incruse.  - pulm toilet: Flutter valve already ordered. Consider mucomyst in AM if having significant sputum production.   - Bilateral US: Negative for DVT   - Already on diuretics. Will defer to day team to consider additional diuresis.   - Stopped methylpred, and resumed prednisone 40 mg daily x 5days.   - Day team to reach out to palliative care in AM.     Rhea Wilkinson [facility] Medicine

## 2025-03-04 NOTE — PROGRESS NOTES
[facility]    Medicine Progress Note - Hospitalist Service, GOLD TEAM 19    Date of Admission:  2/28/2025    Assessment & Plan   Harrison Thomas is a 79 year old male admitted on 2/28/2025. He has a history of thyroid cancer with lung metastases, Parkinson's disease, neuropathy, glaucoma, osteoporosis, chronic pain, severe COPD, hyperlipidemia, obstructive sleep apnea and coronary artery disease and is admitted for acute on chronic hypoxic and hypercapnic respiratory failure with increased O2 use requiring HFNC and c/f CAP as potential underlying etiology. Pt admitted for further mgmt and work up, stable at time of admission.        # Acute on chronic hypoxic respiratory failure without hypercarbia with exacerbation of COPD coupled with underlying community-acquired pneumonia.  # H/o severe COPD   Pt with history of multiple pulmonary comorbidities including lung mets from thyroid cancer, severe COPD, and with known BRENNEN with recent hospitalization in January for sepsis 2/2 CAP c/b COPD exacerbation. Ddx  including CAP given septic picture vs aspiration induced vs PE vs ACS vs HF exacerbation vs viral etiology for AHHRF vs lack of medication adherence vs more sinister including PTX considered.   - Admitted for acute on chronic hypoxic respiratory failure; etiology unclear. Pulmonary team consulting  - Given concern for underlying CAP, started on CTX, but broaden to Zosyn given concern for HAP plus Azithromycin. This was evaluated by pulmonary team as there was a low concern for bacterial  infectious issue. IV antibiotics have now been stopped and resume azithromycin every other day. Respiratory panel did come back as coronavirus, which may be the triggering factor for this admission   - Initiated on IV Solumedrol 62.5 mg q8h, now to taper to 40 mg daily for 5 days per pulm recommendations    -  Aatovaquone daily for PJP prophylaxis   - Duoneb QID with RT. Hold home  inhalers while on nebs  -Furosemide 20 mg BID ( takes 40 mg BID)  -pulm toilet: Flutter valve already ordered. Consider mucomyst in AM if having significant sputum production.   - Bilateral US: Negative for DVT   With the above intervention, hypoxia much better. Per pllex, aim for saturations from 88-90%  Also, will work on reducing high concentration morphine from 5 times daily to 3 times daily             # Elevated lactic acid    likely in the setting of hypoxia. 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 Patient had received radioactive iodine in the past in addition to RT to the lung. 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 TLC noted to be decreased to 74% of predicted at that visit iso his severe COPD and scarring from radiation treatment and volume loss.   -- PTA levothyroxine 112 mcg daily.      # Acute on chronic thrombocytopenia - likely in the setting of acute illness   Unclear etiology. During last hospitalization, thought to be in the setting of infection. Not on heparin, HIT unlikely. PBS with normal platelet morphology 1/29/25, just significantly decreased in number. DDx including medication induced vs immune mediated vs underproduction vs splenic sequestration vs consumption considered. No valorie bleeding and decreased concern for PE on admission tonight making consumptive process unlikely. Hgb stable as well.   -- Given improvement in thrombocytopenia, resume PTA aspirin.  -- Continue to monitor CBC daily for the time being.     # BRENNEN   Refused BiPAP here, unclear if CPAP at home. HFNC overnight, CTM.      # CAD   # HLD   # HTN   # H/o SAH 2023   -- Resume PTA amlodipine 5mg at  bedtime   -- Resume PTA rosuvastatin.  -- PT aspirin resumed.  - lasix 20 mg BID       # Parkinsons disease   Has seen neurology and on sinemet. Patient endorses Left side is weaker than the right. On physical exam, mild rigidity in upper extremities, left more than right. Able to bring both knees to chest without pain in bilateral hips. Gross motor and strength intact.   -Continue PTA sinemet TID      # Chronic pain   # Low back pain   # Neuropathy   Continue PTA chronic pain medications including Gabapentin, Morphine sulfate, APAP prn.  Given possible respiratory depression from 5 times daily opioids, we will reduce this to 3 times daily and monitor pain control. Discussed this with daughter, who is Ok with the taper           Diet: Combination Diet Regular Diet Adult  Snacks/Supplements Adult: Ensure Enlive; Between Meals    DVT Prophylaxis: Pneumatic Compression Devices  Mir Catheter: Not present  Lines: None     Cardiac Monitoring: None  Code Status: No CPR- Do NOT Intubate      Clinically Significant Risk Factors        # Hypokalemia: Lowest K = 3.3 mmol/L in last 2 days, will replace as needed  # Hypernatremia: Highest Na = 148 mmol/L in last 2 days, will monitor as appropriate       # Hypoalbuminemia: Lowest albumin = 3.4 g/dL at 3/1/2025  6:21 AM, will monitor as appropriate   # Thrombocytopenia: Lowest platelets = 77 in last 2 days, will monitor for bleeding   # Hypertension: Noted on problem list                # Financial/Environmental Concerns: none         Social Drivers of Health    Tobacco Use: Medium Risk (2/28/2025)    Patient History     Smoking Tobacco Use: Former     Smokeless Tobacco Use: Never   Physical Activity: Inactive (10/14/2024)    Exercise Vital Sign     Days of Exercise per Week: 0 days     Minutes of Exercise per Session: 0 min   Stress: Stress Concern Present (10/14/2024)    [employer] [place] [employer] - Occupational Stress Questionnaire     Feeling of Stress : Very  much   Social Connections: Unknown (10/14/2024)    Social Connection and Isolation Panel [NHANES]     Frequency of Social Gatherings with Friends and Family: More than three times a week          Disposition Plan     Medically Ready for Discharge: Anticipated in 2-4 Days        Elizabeth Sneed MD  Hospitalist Service, GOLD TEAM 19  [facility]  Securely message with [employer] (more info)  Text page via [employer] Paging/Directory   See signed in provider for up to date coverage information  ______________________________________________________________________    Interval History   Charts reviewed and patient was examined  Patinet was resting comfortably.   Says that he was on 2 lts of O2 at home. We discussed weaning down supplemental Oxygen  We discussed weaning morphine as well  No acute events overnight  Patient says he has back pain, and that's why he is taking pain meds     Physical Exam   Vital Signs: Temp: 98.1  F (36.7  C) Temp src: Oral BP: (!) 142/81 Pulse: 96   Resp: 17 SpO2: 96 % O2 Device: (S) None (Room air) Oxygen Delivery: 40 LPM  Weight: 160 lbs 11.45 oz    General Appearance: Sitting in bed, on high flow nasal cannula.  HEENT: PERRL: EOMI; moist mucous membrane w/o lesions  Neck: No JVD  Pulmonary: Inspiratory and respiratory wheeze on auscultation, scattered crackles on top of it.  CVS: Regular rhythm, no murmurs, rubs or gallops  GI: BS (+), soft nontender, no rebound or guarding   Extremities: No LE edema   Skin: Diffuse ecchymosis bilateral upper and lower extremity.  Neurologic: A&O x3      Medical Decision Making       55 MINUTES SPENT BY ME on the date of service doing chart review, history, exam, documentation & further activities per the note.    Discussed with bedside RN, care management and daughter ( via phone)  Data   ------------------------- PAST 24 HR DATA REVIEWED -----------------------------------------------        Imaging  results reviewed over the past 24 hrs:   Recent Results (from the past 24 hours)   Echo Complete    Narrative    [id number]  [id number]  [id number]  [id number]^MYNOR^LUCIANA     [facility],[place]  Echocardiography Laboratory  [address] [place] [id number]     Name: ABDIRASHID EPSTEIN  MRN: [id number]  : 1945  Study Date: 2025 02:04 PM  Age: 79 yrs  Gender: Male  Patient Location: [facility]  Reason For Study: SOB  Ordering Physician: LUCIANA LOWE  Performed By: Bernie Rivera RDCS     BSA: 1.9 m2  Height: 70 in  Weight: 160 lb  ______________________________________________________________________________  Procedure  Echocardiogram with two-dimensional, color and spectral Doppler. Contrast  Optison. Technically difficult study.Extremely poor acoustic windows. Optison  (NDC #0223-8214-86) given intravenously. Patient was given 6 ml mixture of 3  ml Optison and 6 ml saline. 3 ml wasted.  ______________________________________________________________________________  Interpretation Summary  Technically difficult study.Extremely poor acoustic windows. Limited views  available for analysis     On gross contrasted views biventricular function appears preserved.  IVC diameter <2.1 cm collapsing >50% with sniff suggests a normal RA pressure  of 3 mmHg.  No pericardial effusion is present.  ______________________________________________________________________________  Right Ventricle  Global right ventricular function is normal.     Aortic Valve  The valve leaflets are not well visualized.     Tricuspid Valve  The tricuspid valve cannot be assessed.     Pulmonic Valve  The valve leaflets are not well visualized.     Vessels  IVC diameter <2.1 cm collapsing >50% with sniff suggests a normal RA pressure  of 3 mmHg.     Pericardium  No pericardial effusion is present.  ______________________________________________________________________________  Doppler Measurements &  Calculations  RV S Yung: 22.2 cm/sec     ______________________________________________________________________________  Report approved by: Tonya Pink Dr on 03/03/2025 03:21 PM         US Lower Extremity Venous Duplex Bilateral    Narrative    Bilateral lower extremity venous duplex ultrasound    INDICATION: Rule out DVT bilaterally    COMPARISON: None    FINDINGS: No echogenic thrombus. Good response to compression when  performed. Color flow and Doppler assessment unremarkable. No fluid  collections.      Impression    IMPRESSION: Negative evaluation for DVT by ultrasound    DAVID RUIZ MD         SYSTEM ID:  [id number]

## 2025-03-04 NOTE — PROGRESS NOTES
Brief pulmonary note:    Chart reviewed. US negative for DVT. Patient seems to have stabilized form a respiratory standpoint. Agree with current plan of care and attempts to wean scheduled morphine.     Harrison will remain at high risk for aspiration events in the future, which can be discussed with him and his daughter. Although his experience with palliative care has been challenging in the past, it might still be helpful to have their recommendations, especially as opiates are being weaned.    Thank you for asking us to participate in this patient's care. We will sign off. Please do not hesitate to call back with questions.     Karon Cast MD  PGY-4, Pulmonary and Critical Care  Pager [phone]

## 2025-03-04 NOTE — PLAN OF CARE
Goal Outcome Evaluation:         Pt A&Ox4. Pt denies chest pain, has SOB, on 2L O2, pt has scheduled nebs. Pt states pain, managed with oral morphine. Potassium replaced this shift. 2 BM today. Pt stats above 90, pt has some wheezing, called respiratory, gave PRN neb. Continue tele. Mir in place, adequate output. No significant changes this shift. Continue POC.

## 2025-03-04 NOTE — PLAN OF CARE
Patient here for respiratory failure    Goal Outcome Evaluation:      Plan of Care Reviewed With: patient, child    Overall Patient Progress: improving    Outcome Evaluation: Positive for coronavirus      VS: Temp: 98.2  F (36.8  C) Temp src: Oral BP: 127/87 Pulse: 100   Resp: 18 SpO2: 96 % O2 Device: High Flow Nasal Cannula (HFNC) Oxygen Delivery: 40 LPM    O2: 40 LPM HFNC   Output: Per martinez   Last BM: 3/3   Activity: SBA with own rolling walker   Skin: Scattered bruising   Pain: Mild   Neuro: A&O x4, forgetful   Labs: Had ultrasound to rule out DVT   Diet: Regular   IV: R PIV, R extended dwell saline locked   LDA:  Martinez   Plan: Continue weaning oxygen   Additional Info:

## 2025-03-05 ENCOUNTER — APPOINTMENT (OUTPATIENT)
Dept: PHYSICAL THERAPY | Facility: CLINIC | Age: 80
End: 2025-03-05
Payer: MEDICARE

## 2025-03-05 LAB
ANION GAP SERPL CALCULATED.3IONS-SCNC: 9 MMOL/L (ref 7–15)
BACTERIA BLD CULT: NO GROWTH
BACTERIA BLD CULT: NO GROWTH
BUN SERPL-MCNC: 19.3 MG/DL (ref 8–23)
CALCIUM SERPL-MCNC: 8.7 MG/DL (ref 8.8–10.4)
CHLORIDE SERPL-SCNC: 108 MMOL/L (ref 98–107)
CREAT SERPL-MCNC: 0.82 MG/DL (ref 0.67–1.17)
EGFRCR SERPLBLD CKD-EPI 2021: 90 ML/MIN/1.73M2
ERYTHROCYTE [DISTWIDTH] IN BLOOD BY AUTOMATED COUNT: 15.9 % (ref 10–15)
GLUCOSE BLDC GLUCOMTR-MCNC: 82 MG/DL (ref 70–99)
GLUCOSE SERPL-MCNC: 118 MG/DL (ref 70–99)
HCO3 SERPL-SCNC: 30 MMOL/L (ref 22–29)
HCT VFR BLD AUTO: 38.6 % (ref 40–53)
HGB BLD-MCNC: 12.1 G/DL (ref 13.3–17.7)
MCH RBC QN AUTO: 30.9 PG (ref 26.5–33)
MCHC RBC AUTO-ENTMCNC: 31.3 G/DL (ref 31.5–36.5)
MCV RBC AUTO: 99 FL (ref 78–100)
PLATELET # BLD AUTO: 89 10E3/UL (ref 150–450)
POTASSIUM SERPL-SCNC: 3.9 MMOL/L (ref 3.4–5.3)
RBC # BLD AUTO: 3.92 10E6/UL (ref 4.4–5.9)
SODIUM SERPL-SCNC: 147 MMOL/L (ref 135–145)
WBC # BLD AUTO: 7.6 10E3/UL (ref 4–11)

## 2025-03-05 PROCEDURE — 82565 ASSAY OF CREATININE: CPT | Performed by: INTERNAL MEDICINE

## 2025-03-05 PROCEDURE — 80048 BASIC METABOLIC PNL TOTAL CA: CPT | Performed by: INTERNAL MEDICINE

## 2025-03-05 PROCEDURE — 99223 1ST HOSP IP/OBS HIGH 75: CPT

## 2025-03-05 PROCEDURE — 250N000013 HC RX MED GY IP 250 OP 250 PS 637: Performed by: PHYSICIAN ASSISTANT

## 2025-03-05 PROCEDURE — 97530 THERAPEUTIC ACTIVITIES: CPT | Mod: GP

## 2025-03-05 PROCEDURE — 999N000157 HC STATISTIC RCP TIME EA 10 MIN

## 2025-03-05 PROCEDURE — 94640 AIRWAY INHALATION TREATMENT: CPT | Mod: 76

## 2025-03-05 PROCEDURE — 36415 COLL VENOUS BLD VENIPUNCTURE: CPT | Performed by: INTERNAL MEDICINE

## 2025-03-05 PROCEDURE — 250N000012 HC RX MED GY IP 250 OP 636 PS 637: Performed by: PHYSICIAN ASSISTANT

## 2025-03-05 PROCEDURE — 250N000009 HC RX 250: Performed by: INTERNAL MEDICINE

## 2025-03-05 PROCEDURE — 85027 COMPLETE CBC AUTOMATED: CPT | Performed by: INTERNAL MEDICINE

## 2025-03-05 PROCEDURE — 250N000013 HC RX MED GY IP 250 OP 250 PS 637: Performed by: INTERNAL MEDICINE

## 2025-03-05 PROCEDURE — 250N000013 HC RX MED GY IP 250 OP 250 PS 637

## 2025-03-05 PROCEDURE — 99232 SBSQ HOSP IP/OBS MODERATE 35: CPT | Performed by: INTERNAL MEDICINE

## 2025-03-05 PROCEDURE — 84132 ASSAY OF SERUM POTASSIUM: CPT | Performed by: INTERNAL MEDICINE

## 2025-03-05 PROCEDURE — 94799 UNLISTED PULMONARY SVC/PX: CPT

## 2025-03-05 PROCEDURE — 120N000002 HC R&B MED SURG/OB UMMC

## 2025-03-05 PROCEDURE — 97161 PT EVAL LOW COMPLEX 20 MIN: CPT | Mod: GP

## 2025-03-05 PROCEDURE — 94640 AIRWAY INHALATION TREATMENT: CPT

## 2025-03-05 RX ORDER — PREDNISONE 2.5 MG/1
5 TABLET ORAL DAILY
Status: DISCONTINUED | OUTPATIENT
Start: 2025-03-27 | End: 2025-03-12 | Stop reason: HOSPADM

## 2025-03-05 RX ORDER — PREDNISONE 1 MG/1
2 TABLET ORAL DAILY
Status: DISCONTINUED | OUTPATIENT
Start: 2025-04-03 | End: 2025-03-12 | Stop reason: HOSPADM

## 2025-03-05 RX ORDER — PREDNISONE 2.5 MG/1
10 TABLET ORAL DAILY
Status: DISCONTINUED | OUTPATIENT
Start: 2025-03-20 | End: 2025-03-12 | Stop reason: HOSPADM

## 2025-03-05 RX ORDER — PREDNISONE 20 MG/1
20 TABLET ORAL DAILY
Status: DISCONTINUED | OUTPATIENT
Start: 2025-03-13 | End: 2025-03-12 | Stop reason: HOSPADM

## 2025-03-05 RX ORDER — POTASSIUM CHLORIDE 1500 MG/1
20 TABLET, EXTENDED RELEASE ORAL 2 TIMES DAILY
Status: DISCONTINUED | OUTPATIENT
Start: 2025-03-05 | End: 2025-03-12 | Stop reason: HOSPADM

## 2025-03-05 RX ADMIN — Medication 25 MCG: at 10:06

## 2025-03-05 RX ADMIN — THIAMINE HCL TAB 100 MG 100 MG: 100 TAB at 10:06

## 2025-03-05 RX ADMIN — MORPHINE SULFATE 7.5 MG: 20 SOLUTION ORAL at 08:47

## 2025-03-05 RX ADMIN — ROSUVASTATIN 10 MG: 10 TABLET, FILM COATED ORAL at 10:07

## 2025-03-05 RX ADMIN — AMLODIPINE BESYLATE 5 MG: 5 TABLET ORAL at 21:05

## 2025-03-05 RX ADMIN — PANTOPRAZOLE SODIUM 40 MG: 40 TABLET, DELAYED RELEASE ORAL at 17:11

## 2025-03-05 RX ADMIN — GUAIFENESIN 600 MG: 600 TABLET, EXTENDED RELEASE ORAL at 10:07

## 2025-03-05 RX ADMIN — LEVOTHYROXINE SODIUM 125 MCG: 125 TABLET ORAL at 10:07

## 2025-03-05 RX ADMIN — IPRATROPIUM BROMIDE AND ALBUTEROL SULFATE 3 ML: .5; 3 SOLUTION RESPIRATORY (INHALATION) at 16:40

## 2025-03-05 RX ADMIN — IPRATROPIUM BROMIDE AND ALBUTEROL SULFATE 3 ML: .5; 3 SOLUTION RESPIRATORY (INHALATION) at 08:49

## 2025-03-05 RX ADMIN — MORPHINE SULFATE 7.5 MG: 20 SOLUTION ORAL at 14:07

## 2025-03-05 RX ADMIN — IPRATROPIUM BROMIDE AND ALBUTEROL SULFATE 3 ML: .5; 3 SOLUTION RESPIRATORY (INHALATION) at 12:31

## 2025-03-05 RX ADMIN — ATOVAQUONE 1500 MG: 750 SUSPENSION ORAL at 10:08

## 2025-03-05 RX ADMIN — MORPHINE SULFATE 7.5 MG: 20 SOLUTION ORAL at 21:35

## 2025-03-05 RX ADMIN — PREDNISONE 40 MG: 20 TABLET ORAL at 10:06

## 2025-03-05 RX ADMIN — AZITHROMYCIN DIHYDRATE 500 MG: 250 TABLET ORAL at 10:07

## 2025-03-05 RX ADMIN — GABAPENTIN 400 MG: 300 CAPSULE ORAL at 21:05

## 2025-03-05 RX ADMIN — PANTOPRAZOLE SODIUM 40 MG: 40 TABLET, DELAYED RELEASE ORAL at 10:06

## 2025-03-05 RX ADMIN — ASPIRIN 81 MG: 81 TABLET ORAL at 10:06

## 2025-03-05 RX ADMIN — GABAPENTIN 400 MG: 300 CAPSULE ORAL at 10:06

## 2025-03-05 RX ADMIN — POLYETHYLENE GLYCOL 3350 17 G: 17 POWDER, FOR SOLUTION ORAL at 10:09

## 2025-03-05 RX ADMIN — IPRATROPIUM BROMIDE AND ALBUTEROL SULFATE 3 ML: .5; 3 SOLUTION RESPIRATORY (INHALATION) at 03:44

## 2025-03-05 RX ADMIN — GABAPENTIN 400 MG: 300 CAPSULE ORAL at 14:07

## 2025-03-05 RX ADMIN — IPRATROPIUM BROMIDE AND ALBUTEROL SULFATE 3 ML: .5; 3 SOLUTION RESPIRATORY (INHALATION) at 00:32

## 2025-03-05 RX ADMIN — Medication 0.25 MG: at 22:58

## 2025-03-05 RX ADMIN — GUAIFENESIN 600 MG: 600 TABLET, EXTENDED RELEASE ORAL at 21:05

## 2025-03-05 RX ADMIN — IPRATROPIUM BROMIDE AND ALBUTEROL SULFATE 3 ML: .5; 3 SOLUTION RESPIRATORY (INHALATION) at 21:14

## 2025-03-05 ASSESSMENT — ACTIVITIES OF DAILY LIVING (ADL)
ADLS_ACUITY_SCORE: 62

## 2025-03-05 NOTE — CONSULTS
Consulted to run a test claim for Butrans patches.    Patient has pharmacy benefits through [employer] Medicare Part D & [place] Medicaid. Per insurance, the following is covered under the patient's plans:     Buprenorphine patches - $4.90       Please feel free to contact me with any other test claims, prior authorizations, or insurance questions regarding outpatient medications.     Thanks!      Leanne Boone [facility]  Discharge Pharmacy Liaison  [facility]  Pronouns: She/Her/Hers    Securely message with [employer], [employer] Secure Chat, or [email or web address]  Phone: [phone]  Fax: [phone]  [email or web address]

## 2025-03-05 NOTE — PROGRESS NOTES
03/05/25 1100   Appointment Info   Signing Clinician's Name / Credentials (PT) HANNA Chun   Student Supervision On-site supervision provided;Therapy services provided with the co-signing licensed therapist guiding and directing the services, and providing the skilled judgement and assessment throughout the session   Living Environment   People in Home alone   Current Living Arrangements assisted living   Home Accessibility no concerns   Transportation Anticipated agency   Living Environment Comments Pt lives in assisted living, all acessible, living alone.   Self-Care   Usual Activity Tolerance moderate   Current Activity Tolerance fair   Regular Exercise Yes   Activity/Exercise Type strength training   Exercise Amount/Frequency other (see comments)  (occasionally)   Equipment Currently Used at Home walker, rolling;grab bar, tub/shower;grab bar, toilet;hospital bed  (4WW, clarify shower chair vs tub bench)   Fall history within last six months no   Activity/Exercise/Self-Care Comment Only receiving cares for IADLs (cooking) at baseline, IND with ADLs with 4WW and community ambulator. Pt endorses being on 2 LPM O2 at baseline.   General Information   Onset of Illness/Injury or Date of Surgery 02/28/25   Referring Physician Chapis Balderas MD   Patient/Family Therapy Goals Statement (PT) improve ability to breathe   Pertinent History of Current Problem (include personal factors and/or comorbidities that impact the POC) 79 year old male admitted on 2/28/2025. He has a history of thyroid cancer with lung metastases, Parkinson's disease, neuropathy, glaucoma, osteoporosis, chronic pain, severe COPD, hyperlipidemia, obstructive sleep apnea and coronary artery disease and is admitted for acute on chronic hypoxic and hypercapnic respiratory failure with increased O2 use requiring HFNC and c/f CAP as potential underlying etiology. Pt admitted for further mgmt and work up, stable at time of admission.   Existing  Precautions/Restrictions fall   General Observations Pt satting in >95% on 2.5 LPM O2, agreeable to PT.   Cognition   Affect/Mental Status (Cognition) WFL   Pain Assessment   Patient Currently in Pain Yes, see Vital Sign flowsheet  (5/10)   Integumentary/Edema   Integumentary/Edema other (describe)   Integumentary/Edema Comments scattered bruising   Posture    Posture Forward head position;Protracted shoulders   Range of Motion (ROM)   Range of Motion ROM deficits secondary to weakness   ROM Comment Pt amb with both hips in ER   Strength (Manual Muscle Testing)   Strength (Manual Muscle Testing) Deficits observed during functional mobility   Strength Comments decreased endurance for physical activity, limited lung function   Bed Mobility   Bed Mobility supine-sit   Comment, (Bed Mobility) SBA with assist at 4WW for arm support   Transfers   Transfers sit-stand transfer   Comment, (Transfers) SBA with 4WW   Gait/Stairs (Locomotion)   [place] Level (Gait) contact guard   Distance in Feet (Gait) 2   Comment, (Gait/Stairs) CGA with 4WW   Balance   Balance no deficits were identified   Sensory Examination   Sensory Perception WFL   Coordination   Coordination no deficits were identified   Muscle Tone   Muscle Tone no deficits were identified   Clinical Impression   Criteria for Skilled Therapeutic Intervention Yes, treatment indicated   PT Diagnosis (PT) impaired functional mobility   Influenced by the following impairments decreased endurance and strength 2/2 hospital stay and PMH   Functional limitations due to impairments bed mobility, gait, stairs, transfers   Clinical Presentation (PT Evaluation Complexity) stable   Clinical Presentation Rationale per clincian judgement   Clinical Decision Making (Complexity) low complexity   Planned Therapy Interventions (PT) balance training;gait training;home exercise program;bed mobility training;neuromuscular re-education;ROM (range of motion);stair  training;strengthening;transfer training;progressive activity/exercise;risk factor education;home program guidelines   Risk & Benefits of therapy have been explained evaluation/treatment results reviewed;care plan/treatment goals reviewed;risks/benefits reviewed;current/potential barriers reviewed;participants voiced agreement with care plan;participants included;patient   PT Total Evaluation Time   PT Eval, Low Complexity Minutes (68047) 10   Physical Therapy Goals   PT Frequency 5x/week   PT Predicted Duration/Target Date for Goal Attainment 03/26/25   PT Goals Bed Mobility;Transfers;Gait;Aerobic Activity   PT: Bed Mobility Supine to/from sit;Independent   PT: Transfers Modified independent;Sit to/from stand;Assistive device   PT: Gait Modified independent;Assistive device;100 feet   PT: Perform aerobic activity with stable cardiovascular response continuous activity;10 minutes;ambulation   Interventions   Interventions Quick Adds Therapeutic Activity   Therapeutic Activity   Therapeutic Activities: dynamic activities to improve functional performance Minutes (02104) 15   Symptoms Noted During/After Treatment Fatigue;Shortness of breath   Treatment Detail/Skilled Intervention Pt supine with HOB elevated upon arrival, agreeable to PT after explanation of importance of mobility for improving tolerance to activities/ADLs. Extra time needed for line management/setup. Pt satting in high 90s on 2.5 L upon arrival, bumped up to 3 LPM prior to intiating activity.   bpm at rest. Bed mobility/STS/amb 2 feet- see eval. After eval, pt cued for deep breaths throughout activity in trhough nose, out through mouth. Pt amb 10 ft in room with FWW and CGA on 3 LPM, HR increased to 114 bpm. Pt completes STS at EOB with 4WW, cues for hand and walker placement, brake safety. Pt desatting to 86 during amb but able to increase to mid 90s after sitting for 1 minute. Pt reports feeling too tired to try another lap in room. Pt  completes sit-> supine transfer with SBA and assist at walker for arm support. Pt able to boost up in bed IND. O2 back down to 2.5 LPM, satting in mid 90s, HR back to 100. Pt left with call light in place, all cares met.   PT Discharge Planning   PT Plan progress tolerance for STS and amb with 4WW, use portable O2 for increased distance, wc follow   PT Discharge Recommendation (DC Rec) Transitional Care Facility;home with assist  ([facility])   PT Rationale for DC Rec Pt currently operating below baseline regarding tolerance for activity and unable to return to [facility] without increased support for ADLs, as pt currently is living alone and only receiving assistance for cooking/cleaning. Pt may benfit from TCU to improve endurance and activity tolerance, but also may be appropriate for home with additional cares to help with ADLs if assisted living can provide these services.   PT Brief overview of current status CGA with 4WW   PT Total Distance Amb During Session (feet) 12   Physical Therapy Time and Intention   Timed Code Treatment Minutes 15   Total Session Time (sum of timed and untimed services) 25      Ostomy care and management

## 2025-03-05 NOTE — PLAN OF CARE
Goal Outcome Evaluation:1229-0415      Plan of Care Reviewed With: patient    Overall Patient Progress: improvingOverall Patient Progress: improving      PT A&O x4. On 3L NC highflow. SOB reported this shift, Albuterol used as scheduled. Clarke is patent. 1x Large bm this shift. Scattered bruise noted to hands SBA 1 W /Walker, destats and SOB with activity

## 2025-03-05 NOTE — PROGRESS NOTES
[facility]    Medicine Progress Note - Hospitalist Service, GOLD TEAM 19    Date of Admission:  2/28/2025    Assessment & Plan   Harrison Thomas is a 79 year old male admitted on 2/28/2025. He has a history of thyroid cancer with lung metastases, Parkinson's disease, neuropathy, glaucoma, osteoporosis, chronic pain, severe COPD, hyperlipidemia, obstructive sleep apnea and coronary artery disease and is admitted for acute on chronic hypoxic and hypercapnic respiratory failure with increased O2 use requiring HFNC and c/f CAP as potential underlying etiology. Pt admitted for further mgmt and work up, stable at time of admission.        # Acute on chronic hypoxic respiratory failure without hypercarbia with exacerbation of COPD coupled with underlying community-acquired pneumonia.  # H/o severe COPD   Pt with history of multiple pulmonary comorbidities including lung mets from thyroid cancer, severe COPD, and with known BRENNEN with recent hospitalization in January for sepsis 2/2 CAP c/b COPD exacerbation. Ddx  including CAP given septic picture vs aspiration induced vs PE vs ACS vs HF exacerbation vs viral etiology for AHHRF vs lack of medication adherence vs more sinister including PTX considered.   - Admitted for acute on chronic hypoxic respiratory failure; etiology unclear. Pulmonary team consulting  - Given concern for underlying CAP, started on CTX, but broadened to Zosyn given concern for HAP plus Azithromycin. This was evaluated by pulmonary team as there was a low concern for bacterial  infectious issue. IV antibiotics have now been stopped and resume azithromycin every other day. Respiratory panel did come back as coronavirus, which may be the triggering factor for this admission   - Initiated on IV Solumedrol 62.5 mg q8h, now to taper to 40 mg daily for 5 days per pulm recommendations    -  Aatovaquone daily for PJP prophylaxis   - Duoneb QID with RT. Hold home  inhalers while on nebs  -Furosemide 20 mg BID now on hold ( takes 40 mg BID). Unsure why this is needed, as the patient has a relatively normal echo, and IVC does point towards low volume   -pulm toilet: Flutter valve already ordered. .   - Bilateral US: Negative for DVT   With the above intervention, hypoxia much better. Per xavier, aim for saturations from 88-90%   Given severe debilitating COPD, and the effects of opiates (used  for symptom control), one has to achieve the right balance as increased opiates will depress breathing. Given this issue, I have reduced the high conc morphine from 5 times a day to 3 times a day. However, will ask for assistance from palliative care for assistance, and also to help with goals of care ( as I suspect that he will continue to have recurring admissions for this reason). Patient lives in a long term care unit, and may benefit from discussions along the lines of hospice. He is currently going through divorce proceedings in the court               # Elevated lactic acid    likely in the setting of hypoxia. 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 Patient had received radioactive iodine in the past in addition to RT to the lung. 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 TLC noted to be decreased to 74% of predicted at that visit iso his severe COPD and scarring from radiation treatment and volume loss.   -- PTA levothyroxine 112 mcg daily.      # Acute on chronic thrombocytopenia - likely in the setting of acute illness   Unclear etiology. During last hospitalization, thought to be in the setting of infection. Not on heparin, HIT unlikely.  PBS with normal platelet morphology 1/29/25, just significantly decreased in number. DDx including medication induced vs immune mediated vs underproduction vs splenic sequestration vs consumption considered. No valorie bleeding and decreased concern for PE on admission tonight making consumptive process unlikely. Hgb stable as well.   -- Given improvement in thrombocytopenia, resume PTA aspirin.  -- Continue to monitor CBC daily for the time being. Platelet count has improved to 89     # BRENNEN   Refused BiPAP here, unclear if CPAP at home. HFNC overnight, CTM.      # CAD   # HLD   # HTN   # H/o SAH 2023   -- Resume PTA amlodipine 5mg at bedtime   -- Resume PTA rosuvastatin.  -- PT aspirin resumed.  - lasix 20 mg BID on hold     Hypernatremia:  Likely due to dehydration. Hold lasix for now        # Parkinsons disease   Has seen neurology and on sinemet. Patient endorses Left side is weaker than the right. On physical exam, mild rigidity in upper extremities, left more than right. Able to bring both knees to chest without pain in bilateral hips. Gross motor and strength intact.   -Continue PTA sinemet TID      # Chronic pain   # Low back pain   # Neuropathy   Continue PTA chronic pain medications including Gabapentin, Morphine sulfate, APAP prn.  Given possible respiratory depression from 5 times daily opioids, we will reduce this to 3 times daily and monitor pain control. Discussed this with daughter, who is Ok with the taper           Diet: Combination Diet Regular Diet Adult  Snacks/Supplements Adult: Ensure Enlive; Between Meals    DVT Prophylaxis: Pneumatic Compression Devices  Mir Catheter: Not present  Lines: None     Cardiac Monitoring: None  Code Status: No CPR- Do NOT Intubate      Clinically Significant Risk Factors         # Hypernatremia: Highest Na = 147 mmol/L in last 2 days, will monitor as appropriate  # Hyperchloremia: Highest Cl = 108 mmol/L in last 2 days, will monitor as appropriate          #  Hypoalbuminemia: Lowest albumin = 3.4 g/dL at 3/1/2025  6:21 AM, will monitor as appropriate   # Thrombocytopenia: Lowest platelets = 89 in last 2 days, will monitor for bleeding   # Hypertension: Noted on problem list                # Financial/Environmental Concerns: none         Social Drivers of Health    Tobacco Use: Medium Risk (2/28/2025)    Patient History     Smoking Tobacco Use: Former     Smokeless Tobacco Use: Never   Physical Activity: Inactive (10/14/2024)    Exercise Vital Sign     Days of Exercise per Week: 0 days     Minutes of Exercise per Session: 0 min   Stress: Stress Concern Present (10/14/2024)    [employer] [employer] - Occupational Stress Questionnaire     Feeling of Stress : Very much   Social Connections: Unknown (10/14/2024)    Social Connection and Isolation Panel [NHANES]     Frequency of Social Gatherings with Friends and Family: More than three times a week          Disposition Plan     Medically Ready for Discharge: Anticipated in 2-4 Days        Elizabeth Sneed MD  Hospitalist Service, [address]  Securely message with [employer] (more info)  Text page via [facility] Paging/Directory   See signed in provider for up to date coverage information  ______________________________________________________________________    Interval History   Chart reviewed and patient was examined.  There were no acute events noted overnight.  Patient has been on 2 L of supplemental oxygen, and has been doing fairly well.  This morning he was found to have more audible respiratory sounds, likely secondary to upper airway secretions.  Patient notes that he is going through divorce at this point of time, and will be unable to go to the court.  He feels that this is why the  was trying to get hold of the medical team yesterday.      Physical Exam   Vital Signs: Temp: 98  F (36.7  C) Temp src: Axillary BP: 137/78 Pulse:  99   Resp: 18 SpO2: 98 % O2 Device: Nasal cannula Oxygen Delivery: 2 LPM  Weight: 160 lbs 11.45 oz    General Appearance: Sitting in bed, on high flow nasal cannula.  HEENT: PERRL: EOMI; moist mucous membrane w/o lesions  Neck: No JVD  Pulmonary: Inspiratory and respiratory wheeze on auscultation, scattered crackles on top of it.  CVS: Regular rhythm, no murmurs, rubs or gallops  GI: BS (+), soft nontender, no rebound or guarding   Extremities: No LE edema   Skin: Diffuse ecchymosis bilateral upper and lower extremity.  Neurologic: A&O x3      Medical Decision Making       55 MINUTES SPENT BY ME on the date of service doing chart review, history, exam, documentation & further activities per the note.    Discussed with bedside RN, care management and daughter ( via phone)  Data   ------------------------- PAST 24 HR DATA REVIEWED -----------------------------------------------    I have personally reviewed the following data over the past 24 hrs:    7.6  \   12.1 (L)   / 89 (L)     147 (H) 108 (H) 19.3 /  118 (H)   3.9 30 (H) 0.82 \       Imaging results reviewed over the past 24 hrs:   No results found for this or any previous visit (from the past 24 hours).

## 2025-03-05 NOTE — PLAN OF CARE
Goal Outcome Evaluation:         Pt A&Ox4. SOB, especially upon exertion, pt on 3 LO2. Scheduled nebs completed, pt states relief after nebs. Wheezing improves, and worsens with movement. Scheduled morphine given. Mir in place with adequate output. Multiple BM today, pt states does not want any more bowel meds. Pt facility called for update, writer gave update on pt status. Continue POC.

## 2025-03-05 NOTE — CONSULTS
"  Palliative Care Consultation Note  [facility]      Patient: Harrison Thomas  Date of Admission:  2/28/2025    Requesting Clinician / Team: Wilner Cordero MD   Reason for consult: Symptom management  Goals of care  Decisional support  Patient and family support     Recommendations & Counseling     GOALS OF CARE:   Life-prolonging with limits  (DNR/DNI)  Met with Harrison at bedside this morning. Family not present during my visit. Goals of care not discussed today. Harrison states it's \"not a good day to visit today\"  Would recommend ongoing goals of care discussion with patient and his daughter Janey in the coming days  Previously recommended hospice cares. Palliative care will continue to follow for symptom management and ongoing goals of care discussion.     ADVANCE CARE PLANNING:  Patient has an advance directive dated 2/18/25.  Primary Health Care Agent Janey Thomas (Daughter).  Alternate(s) na.   There is a POLST form on file, this was reviewed and current.  Code status: No CPR- Do NOT Intubate    MEDICAL MANAGEMENT:   #Pain chronic, cancer-related pain, hx of thyroid cancer with lung mets  #Severe debilitating COPD, dyspnea  #Lethargy, fluctuating mentation  Unable to obtain pain history today, attempted to seeing patient twice today, he states \"today is not good day to visit\"   Patient with debilitating dyspnea, end-stage COPD. At baseline he is managed with high concentration morphine 5 times a day and was reduced to 3 times a day during this admission concerning for opiates effects while still optimizing symptom control per primary team.   Opioid use in the last 24 hours: Total OME (Morphine 27 mg)  Morphine (Roxanol) 7.5 mg p.o. 3 times daily  Morphine (Roxanol) 5-10 mg q2hr prn   Lorazepam 0.25 mg q6hr prn   I submitted pharmacy liaison for Butrans patch on 03/05/25   As of today 3/5, oral morphine equivalent < 30mg; consider initiating " Butrans patch 5 mcg/hr after discussing with patient and family pending cost. [facility] pharmacy liaison's input.  Acetaminophen (Tylenol) 650 mg every 4 hours as needed for mild pain  Continue Mucinex 600 oral twice daily  Continue DuoNebs 4 times daily and q4hr prn  Gabapentin 400 mg oral 3 times daily  Antibiotic management, steroid, and diuresis as per primary  Fan at bedside     #At risk for opioid-induced constipation   Last documented bowel movement on 3/5  Continue MiraLAX daily as needed  Continue Senokot 1-2 tab twice daily as needed    #Chronic pain  #Low back pain   #Neuropathy   APAP 650 mg q4hr  prn   Gabapentin 400 mg po TID   Morphine as above for dyspnea, likely also helping with pain.      #Depression   #Anxiety  Patient is currently going through divorce, unable to go to court as he is hospitalized and endorses significant distress.   Gabapentin as above   Lorazepam 0.25 mg q6hr prn for anxiety and or dyspnea  Consider inpatient psych consult   Consider selective serotonin reuptake inhibitor or SNRI for mood; will defer to primary team     PSYCHOSOCIAL/SPIRITUAL SUPPORT:  Family: Janey Thomas (daughter, [facility]), Freeman (Sister), Adeola Thomas (ex-spouse, currently going through divorce proceedings in court)   [place] community: L[employer] day   Spiritual: Declined      Palliative Care will continue to follow Harrison. Thank you for the consult and allowing us to aid in the care of Harrison Thomas.    These recommendations have been discussed with primary team, nursing, patient and family.    PREETI Soares CNP  [facility], Palliative Care  Securely message with the [employer] Web Console (learn more here) or  Text page via [facility] Paging/Directory       Assessment    Harrison Thomas is a 79 year old male admitted on 2/28/2025 history of thyroid cancer with lung metastases, Parkinson's disease, neuropathy, glaucoma, osteoporosis, chronic pain, severe COPD, hyperlipidemia, obstructive sleep apnea and  coronary artery disease and is admitted for acute on chronic hypoxic and hypercapnic respiratory failure with increased O2 use requiring HFNC and c/f CAP as potential underlying etiology. Pt admitted for further mgmt and work up.     Mr. Thomas is well known to our service and was last seen by my colleague Dr. Wilhelm 2/6/25. Our team is consulted again for symptom management and for ongoing goals of care discussion. Previously recommended hospice care given his end-stage COPD and thyroid cancer with lung metastases.     Today, the patient was seen for:  #Acute on chronic hypoxic respiratory failure without hypercarbia, COPD exacerbation, likely underlying CAP  #History of severe COPD  #Thyroid cancer with lung metastases  #Palliative care encounter  #Goals of care discussion  #Patient and family support    History of Present Illness   Met with Harrison at bedside this morning. Unfortunately, family not at bedside during my visit. I introduced our role as an extra layer of support and how we help patients and families dealing with serious, potentially life-limiting illnesses. I explained the composition of the palliative care team. Palliative care helps patients and families navigate their care while focusing on the whole person; providing emotional, social and spiritual support. Palliative care often assists with symptom management, information sharing about what to expect from the illness, available treatment options and what effect those options may have on the disease course, and provide effective communication and caring support. and Introduced the role of palliative care as an interdisciplinary team that cares for patients with serious illness to help support symptom management, communication, coping for patients and their families as well as support with medical decision making.    Prognosis, Goals, & Planning:   Functional Status just prior to this current hospitalization:  ECOG2 (Ambulatory and capable of all  selfcare but unable to carry out any work activities; may need help with IADLs up and about > 50% of waking hours)    Prognosis, Goals, and/or Advance Care Planning:  Not discussed this visit    Code Status was addressed today:   As per above, previously made DNR/DNI. POLST on file reviewed and is current    Patient's decision making preferences: unable to assess        Patient has decision-making capacity today for complex decisions: Questionable Would recommend consult to OT for cognitive eval          Coping, Meaning, & Spirituality:   Mood, coping, and/or meaning in the context of serious illness were addressed today: Yes    Social:   Patient resides in long-term care facility    Medications:  Reviewed this patient's medication profile and medications from this hospitalization.     [place] Board of Pharmacy Data Base Reviewed:     ROS:  Comprehensive ROS is reviewed and is negative except as here & per HPI:     Physical Exam   Vital Signs with Ranges  Temp:  [98  F (36.7  C)-98.9  F (37.2  C)] 98.5  F (36.9  C)  Pulse:  [] 110  Resp:  [17-20] 20  BP: (136-158)/(73-94) 158/94  SpO2:  [92 %-98 %] 98 %  Wt Readings from Last 10 Encounters:   03/01/25 72.9 kg (160 lb 11.5 oz)   02/24/25 70.2 kg (154 lb 12.2 oz)   02/16/25 70.2 kg (154 lb 12.2 oz)   10/24/24 71.5 kg (157 lb 9.6 oz)   10/18/24 70.8 kg (156 lb)   09/23/24 70.9 kg (156 lb 6 oz)   09/20/24 70.8 kg (156 lb)   07/18/24 73 kg (161 lb)   06/19/24 73 kg (161 lb)   05/30/24 70.8 kg (156 lb)     160 lbs 11.45 oz    PHYSICAL EXAM:  Constitutional: Alert, awake, sitting up in bed  Respiratory: Mild labored breathing, on high flow nasal cannula  Abdomen: Soft nontender, no distention,+BS  NEURO: Alert and oriented x 3  Psychiatric: Very anxious today  SKIN: Warm/dry, scattered bruises bilateral upper extremity  JOINT/EXTREMITIES: Limited exam, seen patient sitting up in bed. Spontaneous movement of bilateral upper extremity noted    Data  reviewed:  CMP  Recent Labs   Lab 03/05/25  0835 03/05/25  0637 03/04/25  2227 03/03/25  0650 03/01/25  0835 03/01/25  0621 02/28/25  2337 02/28/25 2059   NA  --  147*  --  148*  --  141  --  140   POTASSIUM  --  3.9  --  3.3*   < > 5.4*   < > 5.2   CHLORIDE  --  108*  --  105  --  102  --  102   CO2  --  30*  --  29  --  26  --  28   ANIONGAP  --  9  --  14  --  13  --  10   GLC 82 118*   < > 169*   < > 145*  --  168*   BUN  --  19.3  --  22.0  --  19.0  --  24.7*   CR  --  0.82  --  0.88  --  0.93  --  1.17   GFRESTIMATED  --  90  --  89  --  85  --  64   KARLIE  --  8.7*  --  8.3*  --  8.3*  --  9.1   PROTTOTAL  --   --   --   --   --  6.0*  --  6.3*   ALBUMIN  --   --   --   --   --  3.4*  --  3.7   BILITOTAL  --   --   --   --   --  0.5  --  0.5   ALKPHOS  --   --   --   --   --  80  --  86   AST  --   --   --   --   --  36  --  25   ALT  --   --   --   --   --  43  --  23    < > = values in this interval not displayed.     CBC  Recent Labs   Lab 03/05/25  0637 03/03/25  0650   WBC 7.6 9.3   RBC 3.92* 3.66*   HGB 12.1* 11.6*   HCT 38.6* 35.9*   MCV 99 98   MCH 30.9 31.7   MCHC 31.3* 32.3   RDW 15.9* 15.8*   PLT 89* 77*       Narrative & Impression   EXAM: CT CHEST W/O CONTRAST 3/1/2025 5:08 PM     HISTORY: 79 years Male Acute on chronic hypoxic respiratory failure  without hypercarbia.     COMPARISON: Chest x-ray 2/28/2025, CT 4/21/2023, 2/12/2025     TECHNIQUE: Helical CT imaging of the chest was obtained WITHOUT  contrast. Multiplanar post-processed and MIP reformats were obtained  reviewed.     FINDINGS:     LINES/TUBES: None.     LUNG: Diffuse emphysematous changes. Bilateral bronchiectasis with  peribronchial vascular groundglass opacities. Flattening of the  diaphragms.     Left lower lobe 5 mm pulmonary nodule (4/182) unchanged.     LARGE AIRWAYS: Layering debris within distal trachea and bilateral  mainstem bronchi.     PLEURA: No pneumothorax or pleural effusion. No pleural mass or  calcification.  Unchanged soft tissue density like thickening of the  right minor fissure.     VESSELS: Normal size/configuration of the great vessels. Aortic arch  calcifications.     HEART: Stable heart size. No pericardial effusion. Coronary artery  calcifications/stents.     MEDIASTINUM AND PILLO: Prominent mediastinal lymph nodes.     CHEST WALL: Unremarkable.     UPPER ABDOMEN: Postoperative changes of prior right hepatectomy.  Partially visualized right hyper density in the medial border of the  right kidney (222), better characterized on CT from 4/21/2023.   Similar appearing circumferential thickening of the distal esophagus.     BONES: Mid thoracic anterior wedge compression deformity, unchanged.  Anterior wedge compression deformity of L2, unchanged.                                                                      IMPRESSION:   1. Diffuse moderate emphysematous changes with lower lobe predominant  patchy peribronchial vascular groundglass opacities which may  represent superimposed infectious/inflammatory process, are overall  unchanged when compared to CT from 2/12/2025. Layering debris within  the bilateral bronchi increased in suspicion for aspiration.  2. Additional chronic ancillary findings as dictated in the operative  report.     I have personally reviewed the examination and initial interpretation  and I agree with the findings.     JUAN RUELAS MD        Narrative & Impression   Bilateral lower extremity venous duplex ultrasound     INDICATION: Rule out DVT bilaterally     COMPARISON: None     FINDINGS: No echogenic thrombus. Good response to compression when  performed. Color flow and Doppler assessment unremarkable. No fluid  collections.                                                                      IMPRESSION: Negative evaluation for DVT by ultrasound     DAVID RUIZ MD           Medical Decision Making       MANAGEMENT DISCUSSED with the following over the past 24 hours: Medicine,  nursing, patient and family   NOTE(S)/MEDICAL RECORDS REVIEWED over the past 24 hours: Medicine, pulmonary, palliative care notes, and nursing  SUPPLEMENTAL HISTORY, in addition to the patient's history, over the past 24 hours obtained from:   - Janey (daughter)  Medical complexity over the past 24 hours:  - Decision to DE-ESCALATE CARE based on prognosis  80 MINUTES SPENT BY ME on the date of service doing chart review, history, exam, documentation & further activities per the note.

## 2025-03-05 NOTE — PROGRESS NOTES
A&Ox4. On 2L NC. Coarse, congestive, wheezy lung sounds. SOB on exertion. Ax1 with walker to bathroom. Chronic back pain. Schedule pain medications given per MAR. Right PIV SL. Patient declined miralax d/t mult BM today per patient. Mir patent. Takes small pills whole. Larger pills take with applesauce.     Continue to monitor.

## 2025-03-06 ENCOUNTER — APPOINTMENT (OUTPATIENT)
Dept: PHYSICAL THERAPY | Facility: CLINIC | Age: 80
End: 2025-03-06
Payer: MEDICARE

## 2025-03-06 ENCOUNTER — APPOINTMENT (OUTPATIENT)
Dept: GENERAL RADIOLOGY | Facility: CLINIC | Age: 80
End: 2025-03-06
Attending: STUDENT IN AN ORGANIZED HEALTH CARE EDUCATION/TRAINING PROGRAM
Payer: MEDICARE

## 2025-03-06 VITALS
RESPIRATION RATE: 20 BRPM | DIASTOLIC BLOOD PRESSURE: 80 MMHG | HEART RATE: 99 BPM | WEIGHT: 160.72 LBS | BODY MASS INDEX: 23.01 KG/M2 | TEMPERATURE: 97.9 F | HEIGHT: 70 IN | SYSTOLIC BLOOD PRESSURE: 141 MMHG | OXYGEN SATURATION: 97 %

## 2025-03-06 LAB
ANION GAP SERPL CALCULATED.3IONS-SCNC: 10 MMOL/L (ref 7–15)
BASE EXCESS BLDV CALC-SCNC: 5.5 MMOL/L (ref -3–3)
BUN SERPL-MCNC: 18.3 MG/DL (ref 8–23)
CALCIUM SERPL-MCNC: 9.1 MG/DL (ref 8.8–10.4)
CHLORIDE SERPL-SCNC: 106 MMOL/L (ref 98–107)
CREAT SERPL-MCNC: 0.69 MG/DL (ref 0.67–1.17)
D DIMER PPP FEU-MCNC: 0.46 UG/ML FEU (ref 0–0.5)
EGFRCR SERPLBLD CKD-EPI 2021: >90 ML/MIN/1.73M2
GLUCOSE BLDC GLUCOMTR-MCNC: 179 MG/DL (ref 70–99)
GLUCOSE BLDC GLUCOMTR-MCNC: 218 MG/DL (ref 70–99)
GLUCOSE SERPL-MCNC: 72 MG/DL (ref 70–99)
HCO3 BLDV-SCNC: 31 MMOL/L (ref 21–28)
HCO3 SERPL-SCNC: 29 MMOL/L (ref 22–29)
HOLD SPECIMEN: NORMAL
HOLD SPECIMEN: NORMAL
O2/TOTAL GAS SETTING VFR VENT: 40 %
OXYHGB MFR BLDV: 90 % (ref 70–75)
PCO2 BLDV: 45 MM HG (ref 40–50)
PH BLDV: 7.44 [PH] (ref 7.32–7.43)
PO2 BLDV: 62 MM HG (ref 25–47)
POTASSIUM SERPL-SCNC: 4 MMOL/L (ref 3.4–5.3)
SAO2 % BLDV: 92.3 % (ref 70–75)
SODIUM SERPL-SCNC: 145 MMOL/L (ref 135–145)

## 2025-03-06 PROCEDURE — 250N000013 HC RX MED GY IP 250 OP 250 PS 637: Performed by: CLINICAL NURSE SPECIALIST

## 2025-03-06 PROCEDURE — 250N000013 HC RX MED GY IP 250 OP 250 PS 637: Performed by: INTERNAL MEDICINE

## 2025-03-06 PROCEDURE — 97110 THERAPEUTIC EXERCISES: CPT | Mod: GP

## 2025-03-06 PROCEDURE — 85379 FIBRIN DEGRADATION QUANT: CPT | Performed by: STUDENT IN AN ORGANIZED HEALTH CARE EDUCATION/TRAINING PROGRAM

## 2025-03-06 PROCEDURE — 71045 X-RAY EXAM CHEST 1 VIEW: CPT

## 2025-03-06 PROCEDURE — 94799 UNLISTED PULMONARY SVC/PX: CPT

## 2025-03-06 PROCEDURE — 36415 COLL VENOUS BLD VENIPUNCTURE: CPT | Performed by: STUDENT IN AN ORGANIZED HEALTH CARE EDUCATION/TRAINING PROGRAM

## 2025-03-06 PROCEDURE — 82805 BLOOD GASES W/O2 SATURATION: CPT | Performed by: STUDENT IN AN ORGANIZED HEALTH CARE EDUCATION/TRAINING PROGRAM

## 2025-03-06 PROCEDURE — 36415 COLL VENOUS BLD VENIPUNCTURE: CPT | Performed by: INTERNAL MEDICINE

## 2025-03-06 PROCEDURE — 999N000157 HC STATISTIC RCP TIME EA 10 MIN

## 2025-03-06 PROCEDURE — 82565 ASSAY OF CREATININE: CPT | Performed by: INTERNAL MEDICINE

## 2025-03-06 PROCEDURE — 250N000009 HC RX 250: Performed by: INTERNAL MEDICINE

## 2025-03-06 PROCEDURE — 71045 X-RAY EXAM CHEST 1 VIEW: CPT | Mod: 26 | Performed by: RADIOLOGY

## 2025-03-06 PROCEDURE — 120N000002 HC R&B MED SURG/OB UMMC

## 2025-03-06 PROCEDURE — 94640 AIRWAY INHALATION TREATMENT: CPT | Mod: 76

## 2025-03-06 PROCEDURE — 250N000011 HC RX IP 250 OP 636: Performed by: STUDENT IN AN ORGANIZED HEALTH CARE EDUCATION/TRAINING PROGRAM

## 2025-03-06 PROCEDURE — 250N000012 HC RX MED GY IP 250 OP 636 PS 637: Performed by: INTERNAL MEDICINE

## 2025-03-06 PROCEDURE — 99233 SBSQ HOSP IP/OBS HIGH 50: CPT | Performed by: CLINICAL NURSE SPECIALIST

## 2025-03-06 PROCEDURE — 99232 SBSQ HOSP IP/OBS MODERATE 35: CPT | Performed by: INTERNAL MEDICINE

## 2025-03-06 PROCEDURE — 94640 AIRWAY INHALATION TREATMENT: CPT

## 2025-03-06 PROCEDURE — 80048 BASIC METABOLIC PNL TOTAL CA: CPT | Performed by: INTERNAL MEDICINE

## 2025-03-06 PROCEDURE — 250N000013 HC RX MED GY IP 250 OP 250 PS 637

## 2025-03-06 RX ORDER — BUPRENORPHINE 5 UG/H
1 PATCH TRANSDERMAL WEEKLY
Status: DISCONTINUED | OUTPATIENT
Start: 2025-03-06 | End: 2025-03-12 | Stop reason: HOSPADM

## 2025-03-06 RX ORDER — FUROSEMIDE 10 MG/ML
20 INJECTION INTRAMUSCULAR; INTRAVENOUS ONCE
Status: COMPLETED | OUTPATIENT
Start: 2025-03-06 | End: 2025-03-06

## 2025-03-06 RX ORDER — ACETYLCYSTEINE 200 MG/ML
2 SOLUTION ORAL; RESPIRATORY (INHALATION) EVERY 4 HOURS
Status: DISCONTINUED | OUTPATIENT
Start: 2025-03-06 | End: 2025-03-11

## 2025-03-06 RX ADMIN — ASPIRIN 81 MG: 81 TABLET ORAL at 09:33

## 2025-03-06 RX ADMIN — ATOVAQUONE 1500 MG: 750 SUSPENSION ORAL at 09:39

## 2025-03-06 RX ADMIN — Medication 25 MCG: at 09:34

## 2025-03-06 RX ADMIN — GUAIFENESIN 600 MG: 600 TABLET, EXTENDED RELEASE ORAL at 09:33

## 2025-03-06 RX ADMIN — MORPHINE SULFATE 7.5 MG: 20 SOLUTION ORAL at 21:44

## 2025-03-06 RX ADMIN — Medication 0.25 MG: at 06:12

## 2025-03-06 RX ADMIN — GABAPENTIN 400 MG: 300 CAPSULE ORAL at 09:33

## 2025-03-06 RX ADMIN — IPRATROPIUM BROMIDE AND ALBUTEROL SULFATE 3 ML: .5; 3 SOLUTION RESPIRATORY (INHALATION) at 00:10

## 2025-03-06 RX ADMIN — GABAPENTIN 400 MG: 300 CAPSULE ORAL at 21:43

## 2025-03-06 RX ADMIN — LEVOTHYROXINE SODIUM 125 MCG: 125 TABLET ORAL at 09:34

## 2025-03-06 RX ADMIN — MORPHINE SULFATE 7.5 MG: 20 SOLUTION ORAL at 09:34

## 2025-03-06 RX ADMIN — Medication 0.25 MG: at 14:55

## 2025-03-06 RX ADMIN — PANTOPRAZOLE SODIUM 40 MG: 40 TABLET, DELAYED RELEASE ORAL at 16:00

## 2025-03-06 RX ADMIN — IPRATROPIUM BROMIDE AND ALBUTEROL SULFATE 3 ML: .5; 3 SOLUTION RESPIRATORY (INHALATION) at 03:56

## 2025-03-06 RX ADMIN — ACETYLCYSTEINE 2 ML: 200 SOLUTION ORAL; RESPIRATORY (INHALATION) at 21:02

## 2025-03-06 RX ADMIN — IPRATROPIUM BROMIDE AND ALBUTEROL SULFATE 3 ML: .5; 3 SOLUTION RESPIRATORY (INHALATION) at 21:02

## 2025-03-06 RX ADMIN — IPRATROPIUM BROMIDE AND ALBUTEROL SULFATE 3 ML: .5; 3 SOLUTION RESPIRATORY (INHALATION) at 03:45

## 2025-03-06 RX ADMIN — PREDNISONE 30 MG: 20 TABLET ORAL at 11:56

## 2025-03-06 RX ADMIN — AMLODIPINE BESYLATE 5 MG: 5 TABLET ORAL at 21:44

## 2025-03-06 RX ADMIN — ACETYLCYSTEINE 2 ML: 200 SOLUTION ORAL; RESPIRATORY (INHALATION) at 17:14

## 2025-03-06 RX ADMIN — MORPHINE SULFATE 7.5 MG: 20 SOLUTION ORAL at 14:54

## 2025-03-06 RX ADMIN — THIAMINE HCL TAB 100 MG 100 MG: 100 TAB at 09:33

## 2025-03-06 RX ADMIN — GABAPENTIN 400 MG: 300 CAPSULE ORAL at 14:55

## 2025-03-06 RX ADMIN — GUAIFENESIN 600 MG: 600 TABLET, EXTENDED RELEASE ORAL at 21:44

## 2025-03-06 RX ADMIN — BUPRENORPHINE 1 PATCH: 5 PATCH, EXTENDED RELEASE TRANSDERMAL at 16:01

## 2025-03-06 RX ADMIN — IPRATROPIUM BROMIDE AND ALBUTEROL SULFATE 3 ML: .5; 3 SOLUTION RESPIRATORY (INHALATION) at 13:12

## 2025-03-06 RX ADMIN — IPRATROPIUM BROMIDE AND ALBUTEROL SULFATE 3 ML: .5; 3 SOLUTION RESPIRATORY (INHALATION) at 16:08

## 2025-03-06 RX ADMIN — ROSUVASTATIN 10 MG: 10 TABLET, FILM COATED ORAL at 09:33

## 2025-03-06 RX ADMIN — FUROSEMIDE 20 MG: 10 INJECTION, SOLUTION INTRAMUSCULAR; INTRAVENOUS at 21:43

## 2025-03-06 RX ADMIN — PANTOPRAZOLE SODIUM 40 MG: 40 TABLET, DELAYED RELEASE ORAL at 09:33

## 2025-03-06 RX ADMIN — IPRATROPIUM BROMIDE AND ALBUTEROL SULFATE 3 ML: .5; 3 SOLUTION RESPIRATORY (INHALATION) at 09:17

## 2025-03-06 ASSESSMENT — ACTIVITIES OF DAILY LIVING (ADL)
ADLS_ACUITY_SCORE: 62
ADLS_ACUITY_SCORE: 66
ADLS_ACUITY_SCORE: 64
ADLS_ACUITY_SCORE: 62
ADLS_ACUITY_SCORE: 66
ADLS_ACUITY_SCORE: 64
ADLS_ACUITY_SCORE: 62
ADLS_ACUITY_SCORE: 64
ADLS_ACUITY_SCORE: 62
ADLS_ACUITY_SCORE: 64
ADLS_ACUITY_SCORE: 62
ADLS_ACUITY_SCORE: 62
ADLS_ACUITY_SCORE: 64
ADLS_ACUITY_SCORE: 62
ADLS_ACUITY_SCORE: 62
ADLS_ACUITY_SCORE: 66
ADLS_ACUITY_SCORE: 62
ADLS_ACUITY_SCORE: 64

## 2025-03-06 NOTE — PROGRESS NOTES
[facility]    Medicine Progress Note - Hospitalist Service, GOLD TEAM 19    Date of Admission:  2/28/2025    Assessment & Plan   Harrison Thomas is a 79 year old male admitted on 2/28/2025. He has a history of thyroid cancer with lung metastases, Parkinson's disease, neuropathy, glaucoma, osteoporosis, chronic pain, severe COPD, hyperlipidemia, obstructive sleep apnea and coronary artery disease and is admitted for acute on chronic hypoxic and hypercapnic respiratory failure with increased O2 use requiring HFNC and c/f CAP as potential underlying etiology. Pt admitted for further mgmt and work up, stable at time of admission.        # Acute on chronic hypoxic respiratory failure without hypercarbia with exacerbation of COPD coupled with underlying community-acquired pneumonia.  # H/o severe COPD   Pt with history of multiple pulmonary comorbidities including lung mets from thyroid cancer, severe COPD, and with known BRENNEN with recent hospitalization in January for sepsis 2/2 CAP c/b COPD exacerbation. Ddx  including CAP given septic picture vs aspiration induced vs PE vs ACS vs HF exacerbation vs viral etiology for AHHRF vs lack of medication adherence vs more sinister including PTX considered.   - Admitted for acute on chronic hypoxic respiratory failure; etiology unclear. Pulmonary team consulting  - Given concern for underlying CAP, started on CTX, but broadened to Zosyn given concern for HAP plus Azithromycin. This was evaluated by pulmonary team as there was a low concern for bacterial  infectious issue. IV antibiotics have now been stopped and resume azithromycin every other day. Respiratory panel did come back as coronavirus, which may be the triggering factor for this admission   - Initiated on IV Solumedrol 62.5 mg q8h, now to taper to 40 mg daily for 5 days per pulm recommendations    - Duoneb QID with RT. Hold home inhalers while on nebs  -Furosemide 20 mg  BID now on hold ( takes 40 mg BID). Unsure why this is needed, as the patient has a relatively normal echo, and IVC does point towards low volume   -pulm toilet: Flutter valve already ordered. .   - Bilateral US: Negative for DVT   With the above intervention, hypoxia much better. Per pllex, aim for saturations from 88-90%   Given severe debilitating COPD, and the effects of opiates (used  for symptom control), one has to achieve the right balance as increased opiates will depress breathing. Given this issue, I have reduced the high conc morphine from 5 times a day to 3 times a day. However, will ask for assistance from palliative care for assistance, and also to help with goals of care ( as I suspect that he will continue to have recurring admissions for this reason). Patient lives in a long term care unit, and may benefit from discussions along the lines of hospice. He is currently going through divorce proceedings in the court   Palliative care has broached the topic already and may consider patient for a butrans patch               # Elevated lactic acid    likely in the setting of hypoxia. 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 Patient had received radioactive iodine in the past in addition to RT to the lung. 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 TLC noted to be decreased to 74% of predicted at that visit iso his severe COPD and scarring from radiation treatment and volume loss.   -- PTA levothyroxine 112 mcg daily.      # Acute on chronic thrombocytopenia - likely in the setting of acute illness   Unclear etiology. During last hospitalization, thought to be in the  setting of infection. Not on heparin, HIT unlikely. PBS with normal platelet morphology 1/29/25, just significantly decreased in number. DDx including medication induced vs immune mediated vs underproduction vs splenic sequestration vs consumption considered. No valorie bleeding and decreased concern for PE on admission making consumptive process unlikely. Hgb stable as well.   -- Given improvement in thrombocytopenia, resume PTA aspirin.  -- Continue to monitor CBC daily for the time being. Platelet count has improved to 89     # BRENNEN   Refused BiPAP here, unclear if CPAP at home. HFNC overnight, CTM.      # CAD   # HLD   # HTN   # H/o SAH 2023   -- Resume PTA amlodipine 5mg at bedtime   -- Resume PTA rosuvastatin.  -- PT aspirin resumed.  - lasix 20 mg BID on hold     Hypernatremia:  Likely due to dehydration. Hold lasix for now    Sodium at 145 on 3/6       # Parkinsons disease   Has seen neurology and on sinemet. Patient endorses Left side is weaker than the right. On physical exam, mild rigidity in upper extremities, left more than right. Able to bring both knees to chest without pain in bilateral hips. Gross motor and strength intact.   -Continue PTA sinemet TID      # Chronic pain   # Low back pain   # Neuropathy   Continue PTA chronic pain medications including Gabapentin, Morphine sulfate, APAP prn.  Given possible respiratory depression from 5 times daily opioids, we will reduce this to 3 times daily and monitor pain control. Discussed this with daughter, who is Ok with the taper           Diet: Combination Diet Regular Diet Adult  Snacks/Supplements Adult: Ensure Enlive; Between Meals    DVT Prophylaxis: Pneumatic Compression Devices  Mir Catheter: PRESENT, indication: Acute retention or obstruction  Lines: None     Cardiac Monitoring: None  Code Status: No CPR- Do NOT Intubate      Clinically Significant Risk Factors         # Hypernatremia: Highest Na = 147 mmol/L in last 2 days, will monitor as  appropriate  # Hyperchloremia: Highest Cl = 108 mmol/L in last 2 days, will monitor as appropriate          # Hypoalbuminemia: Lowest albumin = 3.4 g/dL at 3/1/2025  6:21 AM, will monitor as appropriate   # Thrombocytopenia: Lowest platelets = 89 in last 2 days, will monitor for bleeding   # Hypertension: Noted on problem list                # Financial/Environmental Concerns: none         Social Drivers of Health    Tobacco Use: Medium Risk (2/28/2025)    Patient History     Smoking Tobacco Use: Former     Smokeless Tobacco Use: Never   Physical Activity: Inactive (10/14/2024)    Exercise Vital Sign     Days of Exercise per Week: 0 days     Minutes of Exercise per Session: 0 min   Stress: Stress Concern Present (10/14/2024)    [employer] [employer] - Occupational Stress Questionnaire     Feeling of Stress : Very much   Social Connections: Unknown (10/14/2024)    Social Connection and Isolation Panel [NHANES]     Frequency of Social Gatherings with Friends and Family: More than three times a week          Disposition Plan     Medically Ready for Discharge: Anticipated in 2-4 Days        Elizabeth Sneed MD  Hospitalist Service, [address]  Securely message with [employer] (more info)  Text page via [employer] Paging/Directory   See signed in provider for up to date coverage information  ______________________________________________________________________    Interval History   Chart reviewed and patient was examined.    No acute events overnight. Discussed about the possibility of considering hospice. Patient says that he wound consider it, but not ready  Denies new complaints       Physical Exam   Vital Signs: Temp: 97.9  F (36.6  C) Temp src: Oral BP: (!) 154/107 Pulse: 101   Resp: 20 SpO2: 96 % O2 Device: Nasal cannula Oxygen Delivery: 3 LPM  Weight: 160 lbs 11.45 oz    General Appearance: Sitting in bed, on high flow nasal  cannula.  HEENT: PERRL: EOMI; moist mucous membrane w/o lesions  Neck: No JVD  Pulmonary: significant upper airway sounds making lung sounds difficult to hear   CVS: Regular rhythm, no murmurs, rubs or gallops  GI: BS (+), soft nontender, no rebound or guarding   Extremities: No LE edema   Skin: Diffuse ecchymosis bilateral upper and lower extremity.  Neurologic: A&O x3      Medical Decision Making       35 MINUTES SPENT BY ME on the date of service doing chart review, history, exam, documentation & further activities per the note.    Discussed with bedside RN, care management and daughter ( via phone)  Data   ------------------------- PAST 24 HR DATA REVIEWED -----------------------------------------------    I have personally reviewed the following data over the past 24 hrs:    N/A  \   N/A   / N/A     145 106 18.3 /  72   4.0 29 0.69 \       Imaging results reviewed over the past 24 hrs:   No results found for this or any previous visit (from the past 24 hours).

## 2025-03-06 NOTE — PLAN OF CARE
"Goal Outcome Evaluation:  BP (!) 154/90 (BP Location: Left arm, Patient Position: Semi-Rodriguez's, Cuff Size: Adult Regular)   Pulse 101   Temp 98  F (36.7  C) (Oral)   Resp 20   Ht 1.778 m (5' 10\")   Wt 72.9 kg (160 lb 11.5 oz)   SpO2 96%   BMI 23.06 kg/m     End of shift Summary: See flowsheet for VS and detail assessments.   Changes this Shift:      Pulmonary:Crackles and wheezes all over.SOB with activity.PRN neb given.MD is aware of lung issue.      Output:Good amts from [facility].Reports had BM yesterday.   Activity: Not out of bed.Is SBA1/GB/walker.  Skin:bruises/scabs all over.    Pain: denies   Neuro/CMS: Alert,orientated x3-4.Baseline numbness/tingling on sensation on  BLE.  Drains: Martinez is patent.  IV: extended dwell on R arm,saline locked.   Additional info:MD checked pt.No lasix to be ordered as of yet given good amts from martinez.AM team may decide if lasix is needed if worsening respiratory issues arise.     Plan: Continue POC          Plan of Care Reviewed With: patient.  Overall Patient Progress: not progressing.                        "

## 2025-03-06 NOTE — PLAN OF CARE
Goal Outcome Evaluation:      Plan of Care Reviewed With: patient    Overall Patient Progress: no changeOverall Patient Progress: no change    Outcome Evaluation: [id number]: Pt on 3 L 02 NC, wheezing and congested cough, using acapella, gave scheduled morphine, no help wth dyspnea so pt tried ativan with mild improvement in dyspnea. Pt up to commode A1 walker. Scattered bruising, takes pills with applesauce. Palliative consulted.Continue POC

## 2025-03-06 NOTE — PROGRESS NOTES
"  PALLIATIVE CARE PROGRESS NOTE  [facility]     Patient Name: Harrison Thomas  Date of Admission: 2/28/2025   Today the patient was seen for: goals of care     Recommendations & Counseling       GOALS OF CARE:   Life-prolonging with limits  (DNR/DNI)  Met with Harrison at bedside this morning. Family not present during my visit. Goals of care not discussed today based on hospitalist interaction today of not being ready.  Would recommend ongoing goals of care discussion with patient and his daughter Janey in the coming days.  Previously recommended hospice cares. Palliative care will continue to follow for symptom management and ongoing goals of care discussion.      ADVANCE CARE PLANNING:  Patient has an advance directive dated 2/18/25.  Primary Health Care Agent Janey Thomas (Daughter).  Alternate(s) na.   There is a POLST form on file, this was reviewed and current.  Code status: No CPR- Do NOT Intubate     MEDICAL MANAGEMENT:   #dyspnea  Patient with debilitating dyspnea, end-stage COPD. At baseline he is managed with high concentration morphine 5 times a day and was reduced to 3 times a day during this admission concerning for opiates effects while still optimizing symptom control per primary team. Reports ongoing dyspnea that is a little worse then normal. Reports that nothing is really helping. He is willing to try anything to help feel less SOB so now is open to butrans. I asked him what he thinks would help him breath better and he said \"new lungs\", and acknowledged while true that I wished that was an option.  Opioid use in the last 24 hours: Total OME (Morphine 27 mg)  Morphine (Roxanol) 7.5 mg p.o. 3 times daily, would plan to stop in the coming days as the butrans becomes more helpful  Morphine (Roxanol) 5-10 mg q2hr prn   Lorazepam 0.25 mg q6hr prn   Started butrans patch 5 mcg/hr (ordered for you)  Acetaminophen (Tylenol) 650 mg every 4 hours as needed " for mild pain  Continue Mucinex 600 oral twice daily  Continue DuoNebs 4 times daily and q4hr prn  Gabapentin 400 mg oral 3 times daily  Antibiotic management, steroid, and diuresis as per primary  Fan at bedside      #At risk for opioid-induced constipation   Last documented bowel movement on 3/5  Continue MiraLAX daily as needed  Continue Senokot 1-2 tab twice daily as needed    Palliative Care will continue to follow. Thank you for the consult and allowing us to aid in the care of Harrison Thomas.    These recommendations have been discussed with primary team, bedside nurse.    PREETI Moreira CNS  [facility], Palliative Care  Securely message with the [employer] Web Console (learn more here) or  Text page via [facility] Paging/Directory        Assessment          Harrison Thomas is a 79 year old male admitted on 2/28/2025 history of thyroid cancer with lung metastases, Parkinson's disease, neuropathy, glaucoma, osteoporosis, chronic pain, severe COPD, hyperlipidemia, obstructive sleep apnea and coronary artery disease and is admitted for acute on chronic hypoxic and hypercapnic respiratory failure with increased O2 use requiring HFNC and c/f CAP as potential underlying etiology. Pt admitted for further mgmt and work up.      Mr. Thomas is well known to our service and was last seen by my colleague Dr. Wilhelm 2/6/25. Our team is consulted again for symptom management and for ongoing goals of care discussion. Previously recommended hospice care given his end-stage COPD and thyroid cancer with lung metastases.      Today, the patient was seen for:  #Acute on chronic hypoxic respiratory failure without hypercarbia, COPD exacerbation, likely underlying CAP  #History of severe COPD  #Thyroid cancer with lung metastases  #Palliative care encounter  #Goals of care discussion  #Patient and family support      Interval History:     Multidisciplinary collaboration:  Notes reviewed, not ready for hospice per  hospitalist note today. Considering butrans patch. Treating respiratory status with lasix, 3L NC, using morphine and lorazepam for symptoms. On abx for concern of PNA. Also treated with steroids for COPD exacerbation,     Notable medications:  Gabapentin 400 mg TID  Guaifenesin 600 BID  Duoneb q4h  Morphine 7.5 mg TID  Protonix 40 gm daily  Miralax BID  Prednisone 30 mg daily  Duoneb PRN -x1  Lorazepam 0.25 mg q6h PRN- x2    Patient/family narrative  Saw today, seems to be holding up ok. Feels short of breath. Nothing has helped. Willing to do anything that could help.     Review of Systems:     Besides above, ROS was reviewed and is unremarkable        Physical Exam:   Temp:  [97.8  F (36.6  C)-98  F (36.7  C)] 97.9  F (36.6  C)  Pulse:  [101-118] 101  Resp:  [20] 20  BP: (148-186)/() 154/107  FiO2 (%):  [32 %] 32 %  SpO2:  [95 %-97 %] 96 %  160 lbs 11.45 oz  Constitutional: Alert, awake, sitting up in bed  Respiratory: Mild labored breathing, on high flow nasal cannula  Abdomen: Soft nontender, no distention,+BS  NEURO: Alert and oriented x 3  Psychiatric: Very anxious today  SKIN: Warm/dry, scattered bruises bilateral upper extremity  JOINT/EXTREMITIES: Limited exam, seen patient sitting up in bed. Spontaneous movement of bilateral upper extremity noted            Data Reviewed:     Results for orders placed or performed during the hospital encounter of 02/28/25 (from the past 24 hours)   Basic metabolic panel   Result Value Ref Range    Sodium 145 135 - 145 mmol/L    Potassium 4.0 3.4 - 5.3 mmol/L    Chloride 106 98 - 107 mmol/L    Carbon Dioxide (CO2) 29 22 - 29 mmol/L    Anion Gap 10 7 - 15 mmol/L    Urea Nitrogen 18.3 8.0 - 23.0 mg/dL    Creatinine 0.69 0.67 - 1.17 mg/dL    GFR Estimate >90 >60 mL/min/1.73m2    Calcium 9.1 8.8 - 10.4 mg/dL    Glucose 72 70 - 99 mg/dL     *Note: Due to a large number of results and/or encounters for the requested time period, some results have not been displayed. A  complete set of results can be found in Results Review.     Recent Labs   Lab 03/06/25  0803 03/05/25  0835 03/05/25  0637 03/04/25  2227 03/03/25  0650 03/02/25  0802 03/02/25  0545 03/01/25  0835 03/01/25  0621 02/28/25  2337 02/28/25  2059   WBC  --   --  7.6  --  9.3  --  7.6  --  11.4*  --  10.1   HGB  --   --  12.1*  --  11.6*  --  10.8*  --  12.1*  --  14.0   MCV  --   --  99  --  98  --  97  --  98  --  97   PLT  --   --  89*  --  77*  --  52*  --  39*  --  34*     --  147*  --  148*  --   --   --  141  --  140   POTASSIUM 4.0  --  3.9  --  3.3*  --   --    < > 5.4*   < > 5.2   CHLORIDE 106  --  108*  --  105  --   --   --  102  --  102   CO2 29  --  30*  --  29  --   --   --  26  --  28   BUN 18.3  --  19.3  --  22.0  --   --   --  19.0  --  24.7*   CR 0.69  --  0.82  --  0.88  --   --   --  0.93  --  1.17   ANIONGAP 10  --  9  --  14  --   --   --  13  --  10   KARLIE 9.1  --  8.7*  --  8.3*  --   --   --  8.3*  --  9.1   GLC 72 82 118*   < > 169*   < >  --    < > 145*  --  168*   ALBUMIN  --   --   --   --   --   --   --   --  3.4*  --  3.7   PROTTOTAL  --   --   --   --   --   --   --   --  6.0*  --  6.3*   BILITOTAL  --   --   --   --   --   --   --   --  0.5  --  0.5   ALKPHOS  --   --   --   --   --   --   --   --  80  --  86   ALT  --   --   --   --   --   --   --   --  43  --  23   AST  --   --   --   --   --   --   --   --  36  --  25    < > = values in this interval not displayed.       No results found for this or any previous visit (from the past 24 hours).      Medical Decision Making       54 MINUTES SPENT BY ME on the date of service doing chart review, history, exam, documentation & further activities per the note.

## 2025-03-07 LAB
ANION GAP SERPL CALCULATED.3IONS-SCNC: 13 MMOL/L (ref 7–15)
BUN SERPL-MCNC: 19.3 MG/DL (ref 8–23)
CALCIUM SERPL-MCNC: 9.5 MG/DL (ref 8.8–10.4)
CHLORIDE SERPL-SCNC: 102 MMOL/L (ref 98–107)
CREAT SERPL-MCNC: 0.78 MG/DL (ref 0.67–1.17)
EGFRCR SERPLBLD CKD-EPI 2021: >90 ML/MIN/1.73M2
ERYTHROCYTE [DISTWIDTH] IN BLOOD BY AUTOMATED COUNT: 15.6 % (ref 10–15)
GLUCOSE BLDC GLUCOMTR-MCNC: 119 MG/DL (ref 70–99)
GLUCOSE BLDC GLUCOMTR-MCNC: 120 MG/DL (ref 70–99)
GLUCOSE BLDC GLUCOMTR-MCNC: 145 MG/DL (ref 70–99)
GLUCOSE SERPL-MCNC: 70 MG/DL (ref 70–99)
HCO3 SERPL-SCNC: 28 MMOL/L (ref 22–29)
HCT VFR BLD AUTO: 40 % (ref 40–53)
HGB BLD-MCNC: 13.1 G/DL (ref 13.3–17.7)
MCH RBC QN AUTO: 31.6 PG (ref 26.5–33)
MCHC RBC AUTO-ENTMCNC: 32.8 G/DL (ref 31.5–36.5)
MCV RBC AUTO: 97 FL (ref 78–100)
PLATELET # BLD AUTO: 101 10E3/UL (ref 150–450)
POTASSIUM SERPL-SCNC: 4.6 MMOL/L (ref 3.4–5.3)
RBC # BLD AUTO: 4.14 10E6/UL (ref 4.4–5.9)
SODIUM SERPL-SCNC: 143 MMOL/L (ref 135–145)
WBC # BLD AUTO: 12 10E3/UL (ref 4–11)

## 2025-03-07 PROCEDURE — 250N000013 HC RX MED GY IP 250 OP 250 PS 637

## 2025-03-07 PROCEDURE — 85027 COMPLETE CBC AUTOMATED: CPT | Performed by: INTERNAL MEDICINE

## 2025-03-07 PROCEDURE — 99232 SBSQ HOSP IP/OBS MODERATE 35: CPT

## 2025-03-07 PROCEDURE — 94799 UNLISTED PULMONARY SVC/PX: CPT

## 2025-03-07 PROCEDURE — 999N000157 HC STATISTIC RCP TIME EA 10 MIN

## 2025-03-07 PROCEDURE — 250N000009 HC RX 250: Performed by: INTERNAL MEDICINE

## 2025-03-07 PROCEDURE — 36415 COLL VENOUS BLD VENIPUNCTURE: CPT | Performed by: INTERNAL MEDICINE

## 2025-03-07 PROCEDURE — 94640 AIRWAY INHALATION TREATMENT: CPT | Mod: 76

## 2025-03-07 PROCEDURE — 250N000013 HC RX MED GY IP 250 OP 250 PS 637: Performed by: INTERNAL MEDICINE

## 2025-03-07 PROCEDURE — 250N000013 HC RX MED GY IP 250 OP 250 PS 637: Performed by: PHYSICIAN ASSISTANT

## 2025-03-07 PROCEDURE — 94640 AIRWAY INHALATION TREATMENT: CPT

## 2025-03-07 PROCEDURE — 99233 SBSQ HOSP IP/OBS HIGH 50: CPT | Performed by: INTERNAL MEDICINE

## 2025-03-07 PROCEDURE — 80048 BASIC METABOLIC PNL TOTAL CA: CPT | Performed by: STUDENT IN AN ORGANIZED HEALTH CARE EDUCATION/TRAINING PROGRAM

## 2025-03-07 PROCEDURE — 84295 ASSAY OF SERUM SODIUM: CPT | Performed by: STUDENT IN AN ORGANIZED HEALTH CARE EDUCATION/TRAINING PROGRAM

## 2025-03-07 PROCEDURE — 120N000002 HC R&B MED SURG/OB UMMC

## 2025-03-07 PROCEDURE — 250N000012 HC RX MED GY IP 250 OP 636 PS 637: Performed by: INTERNAL MEDICINE

## 2025-03-07 RX ADMIN — ACETYLCYSTEINE 2 ML: 200 SOLUTION ORAL; RESPIRATORY (INHALATION) at 01:07

## 2025-03-07 RX ADMIN — PANTOPRAZOLE SODIUM 40 MG: 40 TABLET, DELAYED RELEASE ORAL at 06:37

## 2025-03-07 RX ADMIN — GUAIFENESIN 600 MG: 600 TABLET, EXTENDED RELEASE ORAL at 20:29

## 2025-03-07 RX ADMIN — LEVOTHYROXINE SODIUM 125 MCG: 125 TABLET ORAL at 08:23

## 2025-03-07 RX ADMIN — IPRATROPIUM BROMIDE AND ALBUTEROL SULFATE 3 ML: .5; 3 SOLUTION RESPIRATORY (INHALATION) at 22:02

## 2025-03-07 RX ADMIN — IPRATROPIUM BROMIDE AND ALBUTEROL SULFATE 3 ML: .5; 3 SOLUTION RESPIRATORY (INHALATION) at 04:37

## 2025-03-07 RX ADMIN — GABAPENTIN 400 MG: 300 CAPSULE ORAL at 15:45

## 2025-03-07 RX ADMIN — IPRATROPIUM BROMIDE AND ALBUTEROL SULFATE 3 ML: .5; 3 SOLUTION RESPIRATORY (INHALATION) at 15:57

## 2025-03-07 RX ADMIN — Medication 25 MCG: at 08:23

## 2025-03-07 RX ADMIN — MORPHINE SULFATE 7.5 MG: 20 SOLUTION ORAL at 20:29

## 2025-03-07 RX ADMIN — GUAIFENESIN 600 MG: 600 TABLET, EXTENDED RELEASE ORAL at 08:23

## 2025-03-07 RX ADMIN — THIAMINE HCL TAB 100 MG 100 MG: 100 TAB at 08:23

## 2025-03-07 RX ADMIN — ACETYLCYSTEINE 2 ML: 200 SOLUTION ORAL; RESPIRATORY (INHALATION) at 09:02

## 2025-03-07 RX ADMIN — MORPHINE SULFATE 7.5 MG: 20 SOLUTION ORAL at 15:45

## 2025-03-07 RX ADMIN — GABAPENTIN 400 MG: 300 CAPSULE ORAL at 08:23

## 2025-03-07 RX ADMIN — AZITHROMYCIN DIHYDRATE 500 MG: 250 TABLET ORAL at 08:23

## 2025-03-07 RX ADMIN — ACETYLCYSTEINE 2 ML: 200 SOLUTION ORAL; RESPIRATORY (INHALATION) at 12:38

## 2025-03-07 RX ADMIN — IPRATROPIUM BROMIDE AND ALBUTEROL SULFATE 3 ML: .5; 3 SOLUTION RESPIRATORY (INHALATION) at 12:38

## 2025-03-07 RX ADMIN — ATOVAQUONE 1500 MG: 750 SUSPENSION ORAL at 08:31

## 2025-03-07 RX ADMIN — ACETYLCYSTEINE 2 ML: 200 SOLUTION ORAL; RESPIRATORY (INHALATION) at 19:35

## 2025-03-07 RX ADMIN — GABAPENTIN 400 MG: 300 CAPSULE ORAL at 20:29

## 2025-03-07 RX ADMIN — PREDNISONE 30 MG: 20 TABLET ORAL at 08:29

## 2025-03-07 RX ADMIN — ACETYLCYSTEINE 2 ML: 200 SOLUTION ORAL; RESPIRATORY (INHALATION) at 04:38

## 2025-03-07 RX ADMIN — ASPIRIN 81 MG: 81 TABLET ORAL at 08:23

## 2025-03-07 RX ADMIN — AMLODIPINE BESYLATE 5 MG: 5 TABLET ORAL at 20:29

## 2025-03-07 RX ADMIN — ROSUVASTATIN 10 MG: 10 TABLET, FILM COATED ORAL at 08:24

## 2025-03-07 RX ADMIN — IPRATROPIUM BROMIDE AND ALBUTEROL SULFATE 3 ML: .5; 3 SOLUTION RESPIRATORY (INHALATION) at 09:02

## 2025-03-07 RX ADMIN — IPRATROPIUM BROMIDE AND ALBUTEROL SULFATE 3 ML: .5; 3 SOLUTION RESPIRATORY (INHALATION) at 01:07

## 2025-03-07 RX ADMIN — PANTOPRAZOLE SODIUM 40 MG: 40 TABLET, DELAYED RELEASE ORAL at 15:45

## 2025-03-07 RX ADMIN — ACETYLCYSTEINE 2 ML: 200 SOLUTION ORAL; RESPIRATORY (INHALATION) at 15:57

## 2025-03-07 RX ADMIN — IPRATROPIUM BROMIDE AND ALBUTEROL SULFATE 3 ML: .5; 3 SOLUTION RESPIRATORY (INHALATION) at 19:35

## 2025-03-07 RX ADMIN — MORPHINE SULFATE 7.5 MG: 20 SOLUTION ORAL at 08:24

## 2025-03-07 RX ADMIN — Medication 0.25 MG: at 01:40

## 2025-03-07 RX ADMIN — METHOCARBAMOL 750 MG: 750 TABLET ORAL at 12:33

## 2025-03-07 ASSESSMENT — ACTIVITIES OF DAILY LIVING (ADL)
ADLS_ACUITY_SCORE: 66
ADLS_ACUITY_SCORE: 68
ADLS_ACUITY_SCORE: 66
ADLS_ACUITY_SCORE: 65
ADLS_ACUITY_SCORE: 65
ADLS_ACUITY_SCORE: 68
ADLS_ACUITY_SCORE: 66
ADLS_ACUITY_SCORE: 68
ADLS_ACUITY_SCORE: 65
ADLS_ACUITY_SCORE: 68
ADLS_ACUITY_SCORE: 65
ADLS_ACUITY_SCORE: 65
ADLS_ACUITY_SCORE: 68
ADLS_ACUITY_SCORE: 66

## 2025-03-07 NOTE — PROGRESS NOTES
Medicine Cross Cover Note     March 6, 2025 9:17 PM    I was notified by RN that patient was seen by respiratory therapy who are advising Lasix for crackles and dyspnea on exam.  Reviewed chart and handoff provided by the team.  Patient currently has tenuous respiratory status and has had increased O2 needs throughout the day where he was previously requiring 2 L/min and is now on high flow at 40 LPM.  Normally patient takes Lasix 20 mg p.o. twice daily but this has been on hold since yesterday. Per day notes, no clear etiology behind sudden increase in O2 needs throughout the day but some suspicion for opiate driven respiratory suppression so these were titrated down today.       Plan  - Given increased O2 needs will trial dose of IV Lasix 20 mg x 1 and investigate for underlying causes   - Note last chest imaging was CT chest on 3/1 which showed chronic emphysematous changes along along with findings concerning for possible superimposed infection or aspiration.   - Will check STAT CXR and VBG  - Check D-dimer as pt is high risk of embolization 2/2 malignancy   - BMP in the a.m.      Colin López PA-C  Internal Medicine [facility]  Pager [phone]

## 2025-03-07 NOTE — PLAN OF CARE
Goal Outcome Evaluation:        Overall Patient Progress: no change    Pt A & O X 4. Mostly staying awake and very anxious. Given prn Ativan for anxiety and dyspnea. Very wet congested cough but pt unable to spit up the sputum. Pt keeps getting up to the commode but only having smears of BM. Mir patent/draining clear yellow urine. Bed alarm on for safety as pt can be impulsive. Anxious but cooperative and able to make needs known. Nursing is monitoring and assisting as needed    Crissy Sommer RN

## 2025-03-07 NOTE — PLAN OF CARE
Goal Outcome Evaluation:      Plan of Care Reviewed With: patient    Overall Patient Progress: decliningOverall Patient Progress: declining    Outcome Evaluation: [id number] Pt with increased dyspnea, wheezing and crackles, resp did treatments and asked about lasix, paged on call provider, labs and xrays done. Pt on high flow NC. Has scattered bruising, plus one edema to hands and feet. Takes pills with applesauce. DNR DNI. Continue POC

## 2025-03-07 NOTE — PLAN OF CARE
"  VS: BP (!) 154/107 (BP Location: Left arm)   Pulse 101   Temp 97.9  F (36.6  C) (Oral)   Resp 20   Ht 1.778 m (5' 10\")   Wt 72.9 kg (160 lb 11.5 oz)   SpO2 100%   BMI 23.06 kg/m     O2: 2L   NC   Output: 450 mL   Last BM: 3/6/25   Activity: A1/SBA with walker pivot transfer  pt. Refuses gait belt, education provided on safety but continued to refuse,    Up for meals? Yes   Skin: Very fragile skin,  advised to ask for Krystle-sleeves or skin protectors when he gets back to his LTC facility.   Multiple scattered bruising and scabs BUE, BLE,  lotion applied    Pain: 6/10   managed with scheduled meds.  New order for buprenorphine 5mcg/hr  1 wk patch,   Placed to R shoulder   CMS: A&O x 4   Dressing: CDI   Diet: Regular, thin, takes bit pills with applesauce    LDA: RUE extended duel PIV   Equipment: Cpap, [place] O2,  chest physiotherapy, IV, Oxygen, commode    Plan: Met with palliative care,   Continue plan of care   Additional Info: [facility] services offered but pt. Refused. With pt. Permission, gave updates to daughter.    Goal Outcome Evaluation:                        "

## 2025-03-07 NOTE — PROGRESS NOTES
[facility]    Medicine Progress Note - Hospitalist Service, GOLD TEAM 19    Date of Admission:  2/28/2025    Assessment & Plan   Harrison Thomas is a 79 year old male admitted on 2/28/2025. He has a history of thyroid cancer with lung metastases, Parkinson's disease, neuropathy, glaucoma, osteoporosis, chronic pain, severe COPD, hyperlipidemia, obstructive sleep apnea and coronary artery disease and is admitted for acute on chronic hypoxic and hypercapnic respiratory failure with increased O2 use requiring HFNC and c/f CAP as potential underlying etiology. Pt admitted for further mgmt and work up, stable at time of admission.        # Acute on chronic hypoxic respiratory failure without hypercarbia with exacerbation of COPD coupled with underlying community-acquired pneumonia.  # H/o severe COPD   Pt with history of multiple pulmonary comorbidities including lung mets from thyroid cancer, severe COPD, and with known BRENNEN with recent hospitalization in January for sepsis 2/2 CAP c/b COPD exacerbation. Ddx  including CAP given septic picture vs aspiration induced vs PE vs ACS vs HF exacerbation vs viral etiology for AHHRF vs lack of medication adherence vs more sinister including PTX considered.   - Admitted for acute on chronic hypoxic respiratory failure; etiology unclear. Pulmonary team consulting  - Given concern for underlying CAP, started on CTX, but broadened to Zosyn given concern for HAP plus Azithromycin. This was evaluated by pulmonary team as there was a low concern for bacterial  infectious issue. IV antibiotics have now been stopped and resume azithromycin every other day. Respiratory panel did come back as coronavirus, which may be the triggering factor for this admission   - Initiated on IV Solumedrol 62.5 mg q8h, now to taper to 40 mg daily for 5 days per pulm recommendations    - Duoneb QID with RT. Hold home inhalers while on nebs  -Furosemide 20 mg  BID now on hold ( takes 40 mg BID). Unsure why this is needed, as the patient has a relatively normal echo, and IVC does point towards low volume   -pulm toilet: Flutter valve already ordered. .   - Bilateral US: Negative for DVT    Despite the above, patient was in respiratory distress on the evening of 3/6, needing high flow Oxygen, [place] and Mucomyst nebs. Currently on 45 lts/min if HF  A trial of diuretics was tried with no response       Given severe debilitating COPD, and the effects of opiates (used  for symptom control), one has to achieve the right balance as increased opiates will depress breathing.  Palliative care has assisted with this, and now on scheduled Roxanol 7.5 mg TID with additional 5-10 mg Q 2 hrs PRN. Also has additional lorazepam 0.25 mg Q 6 hrs PRN.  Butrans patch 5 mg started on 3/6     Patient lives in a long term care unit, and may benefit from discussions along the lines of hospice. He is currently going through divorce proceedings in the court   Patient is now wanting to explore options around hospice   He is not willing for me to speak to his daughter               # Elevated lactic acid    likely in the setting of hypoxia. 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 Patient had received radioactive iodine in the past in addition to RT to the lung. 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 TLC noted to be decreased to 74% of predicted at that visit iso his severe COPD and scarring from radiation treatment and volume loss.   -- PTA levothyroxine 112 mcg daily.      # Acute on chronic thrombocytopenia - likely in the setting of acute illness    Unclear etiology. During last hospitalization, thought to be in the setting of infection. Not on heparin, HIT unlikely. PBS with normal platelet morphology 1/29/25, just significantly decreased in number. DDx including medication induced vs immune mediated vs underproduction vs splenic sequestration vs consumption considered. No valorie bleeding and decreased concern for PE on admission making consumptive process unlikely. Hgb stable as well.   -- Given improvement in thrombocytopenia, resume PTA aspirin.  -- Continue to monitor CBC daily for the time being. Platelet count has improved to 101     # BRENNEN   Refused BiPAP here, unclear if CPAP at home. HFNC overnight, CTM.      # CAD   # HLD   # HTN   # H/o SAH 2023   -- Resume PTA amlodipine 5mg at bedtime   -- Resume PTA rosuvastatin.  -- PT aspirin resumed.  - lasix 20 mg BID on hold     Hypernatremia:  Likely due to dehydration. Hold lasix for now    Sodium at 143 on 3/7       # Parkinsons disease   Has seen neurology and on sinemet. Patient endorses Left side is weaker than the right. On physical exam, mild rigidity in upper extremities, left more than right. Able to bring both knees to chest without pain in bilateral hips. Gross motor and strength intact.   -Continue PTA sinemet TID      # Chronic pain   # Low back pain   # Neuropathy   Continue PTA chronic pain medications including Gabapentin, Morphine sulfate, APAP prn.  Given possible respiratory depression from 5 times daily opioids, we will reduce this to 3 times daily and monitor pain control. Discussed this with daughter, who is Ok with the taper           Diet: Combination Diet Regular Diet Adult  Snacks/Supplements Adult: Ensure Enlive; Between Meals    DVT Prophylaxis: Pneumatic Compression Devices  Mir Catheter: PRESENT, indication: Acute retention or obstruction  Lines: None     Cardiac Monitoring: None  Code Status: No CPR- Do NOT Intubate      Clinically Significant Risk Factors               #  Hypoalbuminemia: Lowest albumin = 3.4 g/dL at 3/1/2025  6:21 AM, will monitor as appropriate   # Thrombocytopenia: Lowest platelets = 101 in last 2 days, will monitor for bleeding   # Hypertension: Noted on problem list                # Financial/Environmental Concerns: none         Social Drivers of Health    Tobacco Use: Medium Risk (2/28/2025)    Patient History     Smoking Tobacco Use: Former     Smokeless Tobacco Use: Never   Physical Activity: Inactive (10/14/2024)    Exercise Vital Sign     Days of Exercise per Week: 0 days     Minutes of Exercise per Session: 0 min   Stress: Stress Concern Present (10/14/2024)    [employer] [place] [employer] - Occupational Stress Questionnaire     Feeling of Stress : Very much   Social Connections: Unknown (10/14/2024)    Social Connection and Isolation Panel [NHANES]     Frequency of Social Gatherings with Friends and Family: More than three times a week          Disposition Plan     Medically Ready for Discharge: Anticipated in 2-4 Days        Elizabeth Sneed MD  Hospitalist Service, [address]  Securely message with [employer] (more info)  Text page via [employer] Paging/Directory   See signed in provider for up to date coverage information  ______________________________________________________________________    Interval History   Chart reviewed and patient was examined.    Discussed in length with patient about the nature of his lung disease and possible frequent exacerbations in future  Patient wants to explore hospice, but wants to know a little more       Physical Exam   Vital Signs: Temp: 99.4  F (37.4  C) Temp src: Oral BP: (!) 141/80 Pulse: 102   Resp: 17 SpO2: 97 % O2 Device: High Flow Nasal Cannula (HFNC) Oxygen Delivery: 45 LPM  Weight: 160 lbs 11.45 oz    General Appearance: Sitting in bed, on high flow nasal cannula.  HEENT: PERRL: EOMI; moist mucous membrane w/o lesions  Neck: No  JVD  Pulmonary: significant upper airway sounds making lung sounds difficult to hear   CVS: Regular rhythm, no murmurs, rubs or gallops  GI: BS (+), soft nontender, no rebound or guarding   Extremities: No LE edema   Skin: Diffuse ecchymosis bilateral upper and lower extremity.  Neurologic: A&O x3      Medical Decision Making       35 MINUTES SPENT BY ME on the date of service doing chart review, history, exam, documentation & further activities per the note.    Discussed with bedside RN, care management and daughter ( via phone)  Data   ------------------------- PAST 24 HR DATA REVIEWED -----------------------------------------------    I have personally reviewed the following data over the past 24 hrs:    12.0 (H)  \   13.1 (L)   / 101 (L)     143 102 19.3 /  70   4.6 28 0.78 \     INR:  N/A PTT:  N/A   D-dimer:  0.46 Fibrinogen:  N/A       Imaging results reviewed over the past 24 hrs:   Recent Results (from the past 24 hours)   XR Chest Port 1 View    Impression    RESIDENT PRELIMINARY INTERPRETATION  Impression: Unchanged left basilar and right midlung field opacities.  No new focal opacity.

## 2025-03-07 NOTE — PROGRESS NOTES
PALLIATIVE CARE PROGRESS NOTE  [facility]     Patient Name: Harrison Thomas  Date of Admission: 2/28/2025   Today the patient was seen for: ongoing goals of care, symptoms      Recommendations & Counseling       GOALS OF CARE:   Life-prolonging with limits  (DNR/DNI)  Met with Harrison at bedside this morning. Family not present during my visit  Per Dr. Johnson, Harrison expressed wishes for hospice care at discharge  Today, he confirmed he is considering hospice at discharge although does not wish to discuss this with his daughter at this time or feel like talking about it more. Ongoing goals of care discussion would be appropriate. Harrison is still requiring HFNC.  Our team will continue to follow for ongoing goals of care discussion and for symptom management.    ADVANCE CARE PLANNING:  Patient has an advance directive dated 2/18/25.  Primary Health Care Agent Janey Thomas (Daughter).  Alternate(s) na.   There is a POLST form on file, this was reviewed and current.  Code status: No CPR- Do NOT Intubate     MEDICAL MANAGEMENT:   #dyspnea  Patient with debilitating dyspnea, end-stage COPD. At baseline he is managed with high concentration morphine 5 times a day and was reduced to 3 times a day during this admission concerning for opiates effects while still optimizing symptom control per primary team. Today, on 3/7, discussed with hospitalist plan to stop scheduled morphine as butrans becomes more helpful.   Opioid use in the last 24 hours: Total OME (Morphine 30 mg) + Butrans patch 5 mcg/hr started on 3/6.   Morphine (Roxanol) 7.5 mg p.o. 3 times daily, would plan to stop in the coming days as the butrans becomes more helpful  Morphine (Roxanol) 5-10 mg q2hr prn (No prn doses given)   Lorazepam 0.25 mg q6hr prn   Butrans patch 5 mcg/hr started on 3/6   Acetaminophen (Tylenol) 650 mg every 4 hours as needed for mild pain  Continue Mucinex 600 oral twice  daily  Continue DuoNebs 4 times daily and q4hr prn  Gabapentin 400 mg oral 3 times daily  Antibiotic management, steroid, and diuresis as per primary  Fan at bedside      #At risk for opioid-induced constipation   Last documented bowel movement on 3/6  Continue MiraLAX daily as needed  Continue Senokot 1-2 tab twice daily as needed    Palliative Care will continue to follow. Thank you for the consult and allowing us to aid in the care of Harrison Thomas.    These recommendations have been discussed with primary team, bedside nurse.    PREETI Soares CNP  [facility], Palliative Care  Securely message with the [employer] Web Console (learn more here) or  Text page via [facility] Paging/Directory        Assessment          Harrison Thomas is a 79 year old male admitted on 2/28/2025 history of thyroid cancer with lung metastases, Parkinson's disease, neuropathy, glaucoma, osteoporosis, chronic pain, severe COPD, hyperlipidemia, obstructive sleep apnea and coronary artery disease and is admitted for acute on chronic hypoxic and hypercapnic respiratory failure with increased O2 use requiring HFNC and c/f CAP as potential underlying etiology. Pt admitted for further mgmt and work up.      Mr. Thomas is well known to our service and was last seen by my colleague Dr. Wilhelm 2/6/25. Our team is consulted again for symptom management and for ongoing goals of care discussion. Previously recommended hospice care given his end-stage COPD and thyroid cancer with lung metastases.      Today, the patient was seen for:  #Acute on chronic hypoxic respiratory failure without hypercarbia, COPD exacerbation, likely underlying CAP  #History of severe COPD  #Thyroid cancer with lung metastases  #Palliative care encounter  #Goals of care discussion  #Patient and family support      Interval History:     Multidisciplinary collaboration:  Chart reviewed. No major events overnight. Spoke with hospitalist this afternoon. Harrison is now considering  "discharging with hospice. SW consulted, appreciate assistance. Per hospitalist, Harrison does not want to discuss his decisions to move forward with hospice to his daughter at this time. Family not present during my visit. I did not call to speak with patient's daughter today.     Notable medications:  Gabapentin 400 mg TID  Guaifenesin 600 BID  Duoneb q4h  Morphine 7.5 mg TID  Protonix 40 gm daily  Miralax BID  Prednisone 30 mg daily  Duoneb PRN -x1  Lorazepam 0.25 mg q6h PRN- x2    Patient/family narrative  Harrison in good spirit today. He is considering discharge with hospice. He reports his breathing is tolerable today. He endorses chronic back pain, feels like its worse today and \"hates just lying in this bed\"     Review of Systems:     Besides above, ROS was reviewed and is unremarkable        Physical Exam:   Temp:  [99.4  F (37.4  C)] 99.4  F (37.4  C)  Pulse:  [] 102  Resp:  [17] 17  BP: (141)/(80) 141/80  FiO2 (%):  [40 %] 40 %  SpO2:  [94 %-98 %] 97 %  160 lbs 11.45 oz  Constitutional: Alert, awake, sitting up in bed  Respiratory: Mild labored breathing, on high flow nasal cannula  Abdomen: Soft nontender, no distention,+BS  NEURO: Alert and oriented x 3  Psychiatric: Very anxious today  SKIN: Warm/dry, scattered bruises bilateral upper extremity  JOINT/EXTREMITIES: Limited exam, seen patient sitting up in bed. Spontaneous movement of bilateral upper extremity noted        Data Reviewed:     Results for orders placed or performed during the hospital encounter of 02/28/25 (from the past 24 hours)   Glucose by meter   Result Value Ref Range    GLUCOSE BY METER POCT 179 (H) 70 - 99 mg/dL   D dimer quantitative   Result Value Ref Range    D-Dimer Quantitative 0.46 0.00 - 0.50 ug/mL FEU    Narrative    This D-dimer assay is intended for use in conjunction with a clinical pretest probability assessment model to exclude pulmonary embolism (PE) and deep venous thrombosis (DVT) in outpatients suspected of PE or " DVT. The cut-off value is 0.50 ug/mL FEU.    For patients 50 years of age or older, the application of age-adjusted cut-off values for D-Dimer may increase the specificity without significant effect on sensitivity. The literature suggested calculation age adjusted cut-off in ug/L = age in years x 10 ug/L. The results in this laboratory are reported as ug/mL rather than ug/L. The calculation for age adjusted cut off in ug/mL= age in years x 0.01 ug/mL. For example, the cut off for a 76 year old male is 76 x 0.01 ug/mL = 0.76 ug/mL (760 ug/L).    M Chandra et al. Age adjusted D-dimer cut-off levels to rule out pulmonary embolism: The ADJUST-PE Study. TOD 2014;311:3506-5560.; HJ Eloise et al. Diagnostic accuracy of conventional or age adjusted D-dimer cutoff values in older patients with suspected venous thromboembolism. Systemic review and meta-analysis. BMJ 2013:346:f2492.   Blood gas venous   Result Value Ref Range    pH Venous 7.44 (H) 7.32 - 7.43    pCO2 Venous 45 40 - 50 mm Hg    pO2 Venous 62 (H) 25 - 47 mm Hg    Bicarbonate Venous 31 (H) 21 - 28 mmol/L    Base Excess/Deficit Venous 5.5 (H) -3.0 - 3.0 mmol/L    FIO2 40     Oxyhemoglobin Venous 90 (H) 70 - 75 %    O2 Sat, Venous 92.3 (H) 70.0 - 75.0 %    Narrative    In healthy individuals, oxyhemoglobin (O2Hb) and oxygen saturation (SO2) are approximately equal. In the presence of dyshemoglobins, oxyhemoglobin can be considerably lower than oxygen saturation.   Extra Tube    Narrative    The following orders were created for panel order Extra Tube.  Procedure                               Abnormality         Status                     ---------                               -----------         ------                     Extra Green Top (Lithiu...[[id number]]                      Final result               Extra Purple Top Tube[[id number]]                           Final result                 Please view results for these tests on the individual orders.    Extra Green Top (Lithium Heparin) Tube   Result Value Ref Range    Hold Specimen JIC    Extra Purple Top Tube   Result Value Ref Range    Hold Specimen JIC    Glucose by meter   Result Value Ref Range    GLUCOSE BY METER POCT 218 (H) 70 - 99 mg/dL   XR Chest Port 1 View    Narrative    Exam: XR CHEST PORT 1 VIEW, 3/6/2025 9:53 PM    Comparison: 3/1/2025    History: Increased hypoxia throughout the day    Findings:  Portable AP view of the chest. Low lung volumes. Left basilar and  right midlung field opacities. Trachea is midline. Cardiomediastinal  silhouette is within normal limits. Right apical opacity corresponds  with mediastinum seen on chest CT/. No new focal airspace  opacity. No pneumothorax or pleural effusion. The visualized upper  abdomen is unremarkable. No acute osseous abnormalities.      Impression    Impression: Unchanged left basilar and right midlung field opacities.  No new focal opacity.    I have personally reviewed the examination and initial interpretation  and I agree with the findings.    ELI AJ MD         SYSTEM ID:  [id number]   CBC with platelets   Result Value Ref Range    WBC Count 12.0 (H) 4.0 - 11.0 10e3/uL    RBC Count 4.14 (L) 4.40 - 5.90 10e6/uL    Hemoglobin 13.1 (L) 13.3 - 17.7 g/dL    Hematocrit 40.0 40.0 - 53.0 %    MCV 97 78 - 100 fL    MCH 31.6 26.5 - 33.0 pg    MCHC 32.8 31.5 - 36.5 g/dL    RDW 15.6 (H) 10.0 - 15.0 %    Platelet Count 101 (L) 150 - 450 10e3/uL   Basic metabolic panel   Result Value Ref Range    Sodium 143 135 - 145 mmol/L    Potassium 4.6 3.4 - 5.3 mmol/L    Chloride 102 98 - 107 mmol/L    Carbon Dioxide (CO2) 28 22 - 29 mmol/L    Anion Gap 13 7 - 15 mmol/L    Urea Nitrogen 19.3 8.0 - 23.0 mg/dL    Creatinine 0.78 0.67 - 1.17 mg/dL    GFR Estimate >90 >60 mL/min/1.73m2    Calcium 9.5 8.8 - 10.4 mg/dL    Glucose 70 70 - 99 mg/dL   Glucose by meter   Result Value Ref Range    GLUCOSE BY METER POCT 119 (H) 70 - 99 mg/dL     *Note: Due to a large  number of results and/or encounters for the requested time period, some results have not been displayed. A complete set of results can be found in Results Review.     Recent Labs   Lab 03/07/25  1213 03/07/25  0610 03/06/25  2140 03/06/25  1817 03/06/25  0803 03/05/25  0835 03/05/25  0637 03/04/25  2227 03/03/25  0650 03/01/25  0835 03/01/25  0621 02/28/25  2337 02/28/25  2059   WBC  --  12.0*  --   --   --   --  7.6  --  9.3   < > 11.4*  --  10.1   HGB  --  13.1*  --   --   --   --  12.1*  --  11.6*   < > 12.1*  --  14.0   MCV  --  97  --   --   --   --  99  --  98   < > 98  --  97   PLT  --  101*  --   --   --   --  89*  --  77*   < > 39*  --  34*   NA  --  143  --   --  145  --  147*  --  148*  --  141  --  140   POTASSIUM  --  4.6  --   --  4.0  --  3.9  --  3.3*   < > 5.4*   < > 5.2   CHLORIDE  --  102  --   --  106  --  108*  --  105  --  102  --  102   CO2  --  28  --   --  29  --  30*  --  29  --  26  --  28   BUN  --  19.3  --   --  18.3  --  19.3  --  22.0  --  19.0  --  24.7*   CR  --  0.78  --   --  0.69  --  0.82  --  0.88  --  0.93  --  1.17   ANIONGAP  --  13  --   --  10  --  9  --  14  --  13  --  10   KARLIE  --  9.5  --   --  9.1  --  8.7*  --  8.3*  --  8.3*  --  9.1   * 70 218*   < > 72   < > 118*   < > 169*   < > 145*  --  168*   ALBUMIN  --   --   --   --   --   --   --   --   --   --  3.4*  --  3.7   PROTTOTAL  --   --   --   --   --   --   --   --   --   --  6.0*  --  6.3*   BILITOTAL  --   --   --   --   --   --   --   --   --   --  0.5  --  0.5   ALKPHOS  --   --   --   --   --   --   --   --   --   --  80  --  86   ALT  --   --   --   --   --   --   --   --   --   --  43  --  23   AST  --   --   --   --   --   --   --   --   --   --  36  --  25    < > = values in this interval not displayed.       Recent Results (from the past 24 hours)   XR Chest Port 1 View    Narrative    Exam: XR CHEST PORT 1 VIEW, 3/6/2025 9:53 PM    Comparison: 3/1/2025    History: Increased hypoxia throughout  the day    Findings:  Portable AP view of the chest. Low lung volumes. Left basilar and  right midlung field opacities. Trachea is midline. Cardiomediastinal  silhouette is within normal limits. Right apical opacity corresponds  with mediastinum seen on chest CT/. No new focal airspace  opacity. No pneumothorax or pleural effusion. The visualized upper  abdomen is unremarkable. No acute osseous abnormalities.      Impression    Impression: Unchanged left basilar and right midlung field opacities.  No new focal opacity.    I have personally reviewed the examination and initial interpretation  and I agree with the findings.    ELI AJ MD         SYSTEM ID:  [id number]         Medical Decision Making       45 MINUTES SPENT BY ME on the date of service doing chart review, history, exam, documentation & further activities per the note.

## 2025-03-07 NOTE — PROGRESS NOTES
Care Management Follow Up    Length of Stay (days): 7    Expected Discharge Date: 03/10/2025     Concerns to be Addressed: discharge planning     Patient plan of care discussed at interdisciplinary rounds: Yes    Anticipated Discharge Disposition: Long Term Care  [facility] (long term care)  [address] [place] [address]   Admissions Norah: [phone]  Nurse manager Lindy: [phone]  F: [phone]  Anticipated Discharge Services: None  Anticipated Discharge DME: None    Patient/family educated on Medicare website which has current facility and service quality ratings: yes  Education Provided on the Discharge Plan: Yes  Patient/Family in Agreement with the Plan: yes    Referrals Placed by CM/SW:    Private pay costs discussed: Not applicable    Discussed  Partnership in Safe Discharge Planning  document with patient/family: No     Handoff Completed: No, handoff not indicated or clinically appropriate    Additional Information:    Met with pt at bedside per request to discuss hospice and provide a medicare care compare list of hospice agencies. SW explained how the hospice agency would provide visits and care at [facility]. SW informed that an informational meeting can be scheduled with a hospice agency if desired. Pt would like to talk to their daughter first and requested SW follow up tomorrow to see if an informational meeting with hospice is still desired.    Next Steps:     Follow up with pt to discuss hospice informational meeting.  Return to LTC when medically ready  IMM  Transport    JUANA Chong BSNANDINI  Float   [facility]

## 2025-03-08 LAB
GLUCOSE BLDC GLUCOMTR-MCNC: 150 MG/DL (ref 70–99)
GLUCOSE BLDC GLUCOMTR-MCNC: 96 MG/DL (ref 70–99)

## 2025-03-08 PROCEDURE — 999N000040 HC STATISTIC CONSULT NO CHARGE VASC ACCESS

## 2025-03-08 PROCEDURE — 250N000013 HC RX MED GY IP 250 OP 250 PS 637

## 2025-03-08 PROCEDURE — 999N000007 HC SITE CHECK

## 2025-03-08 PROCEDURE — 120N000002 HC R&B MED SURG/OB UMMC

## 2025-03-08 PROCEDURE — 94640 AIRWAY INHALATION TREATMENT: CPT

## 2025-03-08 PROCEDURE — 250N000009 HC RX 250: Performed by: INTERNAL MEDICINE

## 2025-03-08 PROCEDURE — 999N000157 HC STATISTIC RCP TIME EA 10 MIN

## 2025-03-08 PROCEDURE — 250N000013 HC RX MED GY IP 250 OP 250 PS 637: Performed by: INTERNAL MEDICINE

## 2025-03-08 PROCEDURE — 94799 UNLISTED PULMONARY SVC/PX: CPT

## 2025-03-08 PROCEDURE — 250N000012 HC RX MED GY IP 250 OP 636 PS 637: Performed by: INTERNAL MEDICINE

## 2025-03-08 PROCEDURE — 99232 SBSQ HOSP IP/OBS MODERATE 35: CPT | Performed by: INTERNAL MEDICINE

## 2025-03-08 PROCEDURE — 94640 AIRWAY INHALATION TREATMENT: CPT | Mod: 76

## 2025-03-08 RX ADMIN — GUAIFENESIN 600 MG: 600 TABLET, EXTENDED RELEASE ORAL at 08:02

## 2025-03-08 RX ADMIN — GUAIFENESIN 600 MG: 600 TABLET, EXTENDED RELEASE ORAL at 20:46

## 2025-03-08 RX ADMIN — IPRATROPIUM BROMIDE AND ALBUTEROL SULFATE 3 ML: .5; 3 SOLUTION RESPIRATORY (INHALATION) at 04:18

## 2025-03-08 RX ADMIN — ACETYLCYSTEINE 2 ML: 200 SOLUTION ORAL; RESPIRATORY (INHALATION) at 07:51

## 2025-03-08 RX ADMIN — PANTOPRAZOLE SODIUM 40 MG: 40 TABLET, DELAYED RELEASE ORAL at 08:02

## 2025-03-08 RX ADMIN — ASPIRIN 81 MG: 81 TABLET ORAL at 08:01

## 2025-03-08 RX ADMIN — IPRATROPIUM BROMIDE AND ALBUTEROL SULFATE 3 ML: .5; 3 SOLUTION RESPIRATORY (INHALATION) at 00:46

## 2025-03-08 RX ADMIN — PREDNISONE 30 MG: 20 TABLET ORAL at 08:02

## 2025-03-08 RX ADMIN — Medication 0.25 MG: at 16:54

## 2025-03-08 RX ADMIN — IPRATROPIUM BROMIDE AND ALBUTEROL SULFATE 3 ML: .5; 3 SOLUTION RESPIRATORY (INHALATION) at 20:54

## 2025-03-08 RX ADMIN — IPRATROPIUM BROMIDE AND ALBUTEROL SULFATE 3 ML: .5; 3 SOLUTION RESPIRATORY (INHALATION) at 12:36

## 2025-03-08 RX ADMIN — ATOVAQUONE 1500 MG: 750 SUSPENSION ORAL at 08:07

## 2025-03-08 RX ADMIN — ACETYLCYSTEINE 2 ML: 200 SOLUTION ORAL; RESPIRATORY (INHALATION) at 16:04

## 2025-03-08 RX ADMIN — MORPHINE SULFATE 7.5 MG: 20 SOLUTION ORAL at 08:02

## 2025-03-08 RX ADMIN — THIAMINE HCL TAB 100 MG 100 MG: 100 TAB at 08:02

## 2025-03-08 RX ADMIN — ACETYLCYSTEINE 2 ML: 200 SOLUTION ORAL; RESPIRATORY (INHALATION) at 00:46

## 2025-03-08 RX ADMIN — IPRATROPIUM BROMIDE AND ALBUTEROL SULFATE 3 ML: .5; 3 SOLUTION RESPIRATORY (INHALATION) at 16:04

## 2025-03-08 RX ADMIN — ACETYLCYSTEINE 2 ML: 200 SOLUTION ORAL; RESPIRATORY (INHALATION) at 12:37

## 2025-03-08 RX ADMIN — METHOCARBAMOL 750 MG: 750 TABLET ORAL at 16:54

## 2025-03-08 RX ADMIN — GABAPENTIN 400 MG: 300 CAPSULE ORAL at 15:23

## 2025-03-08 RX ADMIN — PANTOPRAZOLE SODIUM 40 MG: 40 TABLET, DELAYED RELEASE ORAL at 16:54

## 2025-03-08 RX ADMIN — ACETYLCYSTEINE 2 ML: 200 SOLUTION ORAL; RESPIRATORY (INHALATION) at 04:17

## 2025-03-08 RX ADMIN — IPRATROPIUM BROMIDE AND ALBUTEROL SULFATE 3 ML: .5; 3 SOLUTION RESPIRATORY (INHALATION) at 07:51

## 2025-03-08 RX ADMIN — LEVOTHYROXINE SODIUM 125 MCG: 125 TABLET ORAL at 08:02

## 2025-03-08 RX ADMIN — GABAPENTIN 400 MG: 300 CAPSULE ORAL at 08:01

## 2025-03-08 RX ADMIN — AMLODIPINE BESYLATE 5 MG: 5 TABLET ORAL at 22:10

## 2025-03-08 RX ADMIN — GABAPENTIN 400 MG: 300 CAPSULE ORAL at 20:46

## 2025-03-08 RX ADMIN — Medication 25 MCG: at 08:02

## 2025-03-08 RX ADMIN — ACETYLCYSTEINE 2 ML: 200 SOLUTION ORAL; RESPIRATORY (INHALATION) at 20:54

## 2025-03-08 RX ADMIN — ROSUVASTATIN 10 MG: 10 TABLET, FILM COATED ORAL at 08:02

## 2025-03-08 RX ADMIN — MORPHINE SULFATE 7.5 MG: 20 SOLUTION ORAL at 15:24

## 2025-03-08 RX ADMIN — MORPHINE SULFATE 7.5 MG: 20 SOLUTION ORAL at 20:46

## 2025-03-08 ASSESSMENT — ACTIVITIES OF DAILY LIVING (ADL)
ADLS_ACUITY_SCORE: 65

## 2025-03-08 NOTE — PROGRESS NOTES
Care Management Follow Up    Length of Stay (days): 8    Expected Discharge Date: 03/11/2025     Concerns to be Addressed: discharge planning     Patient plan of care discussed at interdisciplinary rounds: Yes    Anticipated Discharge Disposition: Long Term Care              Anticipated Discharge Services: None  Anticipated Discharge DME: None    Patient/family educated on Medicare website which has current facility and service quality ratings: no  Education Provided on the Discharge Plan: Yes  Patient/Family in Agreement with the Plan: yes    Referrals Placed by CM/SW:    Private pay costs discussed: Not applicable    Discussed  Partnership in Safe Discharge Planning  document with patient/family: Yes:     Handoff Completed: No, handoff not indicated or clinically appropriate    Additional Information:  SW  met with Pt at bedside. Pt states that he is ready to initiate hospice upon return to [facility]. Per provider  plan is for discharge back to [facility] on Monday with in-house hospice  starting. Attempts were made to contact Pt's daughter without success. SW will follow and help return Pt to long term care with hospice referral for [facility].     Next Steps:   SW/RNCC will follow to return Pt to his LTC facility [facility] and set up [facility].    Antonio Denise, KINA   3/8/2025       Social Work and Care Management Department       SEARCHABLE in [employer] - search SOCIAL WORK       [place] (0800 - 1630) Saturday and Sunday     Units: 4A Vocera, 4C Vocera, & 4E Vocera        Units: 5A [id number] Vocera, 5A 0513-0726 Vocera , BMT SW 1 BMT SW 2, BMT SW 3 & BMT SW 4  5C Off Service 5401 - 5416  5C Off Service [id number]     Units: 6A Vocera & 6B Vocera      Units: 6C Vocera     Units: 7A Vocera & 7B Vocera      Units: 7C Med Surg 7401 thru 7418 and 7C Med Surg 7502 thru 7521      Unit: [place] ED Vocera & [place] Obs Vocera     [facility] ([phone]) Saturday and  Sunday      Units: 5 Ortho Vocera, 5 Med Surg Vocera & WB ED Vocera     Units: 6 Med Surg Vocera, 8 Med Surg Vocera, & 10 ICU Vocera      After hours Vocera [facility] and After Hours Vocera [place]     Please NOTE changes to times below:    **Saturday & Sunday (1630 - 2030)    **Mon-Fri (6548-2216)     **[facility] Recognized Holidays  ([id number])    Units: ALL   - see above VOCERA links to units

## 2025-03-08 NOTE — PLAN OF CARE
Problem: Adult Inpatient Plan of Care  Goal: Absence of Hospital-Acquired Illness or Injury  Outcome: Progressing  Intervention: Identify and Manage Fall Risk  Recent Flowsheet Documentation  Taken 3/7/2025 1100 by Hermelinda Ly RN  Safety Promotion/Fall Prevention:   activity supervised   lighting adjusted  Intervention: Prevent and Manage VTE (Venous Thromboembolism) Risk  Recent Flowsheet Documentation  Taken 3/7/2025 1100 by Hermelinda Ly RN  VTE Prevention/Management: SCDs off (sequential compression devices)  Intervention: Prevent Infection  Recent Flowsheet Documentation  Taken 3/7/2025 1100 by Hermelinda Ly RN  Infection Prevention:   rest/sleep promoted   single patient room provided  Goal: Optimal Comfort and Wellbeing  Outcome: Progressing  Intervention: Provide Person-Centered Care  Recent Flowsheet Documentation  Taken 3/7/2025 1100 by Hermelinda Ly RN  Trust Relationship/Rapport:   care explained   choices provided  Goal: Readiness for Transition of Care  Outcome: Progressing  Flowsheets (Taken 3/7/2025 1930)  Anticipated Changes Related to Illness: none  Transportation Anticipated: agency  Concerns to be Addressed: discharge planning  Intervention: Mutually Develop Transition Plan  Recent Flowsheet Documentation  Taken 3/7/2025 1930 by Hermelinda Ly RN  Anticipated Changes Related to Illness: none  Transportation Anticipated: agency  Concerns to be Addressed: discharge planning     Problem: Comorbidity Management  Goal: Maintenance of COPD Symptom Control  Outcome: Progressing  Intervention: Maintain COPD Symptom Control  Recent Flowsheet Documentation  Taken 3/7/2025 1100 by Hermelinda Ly RN  Medication Review/Management: medications reviewed     Problem: Gas Exchange Impaired  Goal: Optimal Gas Exchange  Outcome: Progressing     Problem: Delirium  Goal: Optimal Coping  Outcome: Progressing  Intervention: Optimize Psychosocial Adjustment to Delirium  Recent  "Flowsheet Documentation  Taken 3/7/2025 1100 by Hermelinda Ly RN  Family/Support System Care: self-care encouraged  Goal: Improved Behavioral Control  Outcome: Progressing  Intervention: Minimize Safety Risk  Recent Flowsheet Documentation  Taken 3/7/2025 1100 by Hermelinda Ly RN  Enhanced Safety Measures:   pain management   review medications for side effects with activity  Trust Relationship/Rapport:   care explained   choices provided  Goal: Improved Attention and Thought Clarity  Outcome: Progressing  Intervention: Maximize Cognitive Function  Recent Flowsheet Documentation  Taken 3/7/2025 1100 by Hermelinda Ly RN  Reorientation Measures: clock in view  Goal: Improved Sleep  Outcome: Progressing   Goal Outcome Evaluation:          VS: BP (!) 141/80   Pulse 102   Temp 97.4  F (36.3  C) (Axillary)   Resp 16   Ht 1.778 m (5' 10\")   Wt 72.9 kg (160 lb 11.5 oz)   SpO2 97%   BMI 23.06 kg/m       O2: Sating >90% on HIFLO 45 LNC and 39%    Output: Mir in place    Last BM: LBM 3/7   Activity: Up with SBA assist, and walker    Skin: Refer to flowsheet    Pain: Pain was managed with scheduled Morphine and Robaxin    CMS: A&Ox4.    Dressing: N/A   Diet: Regular diet BG checks    LDA: PIV to L is S/L.    Equipment: IV pole, FWW, gait belt, and personal belongings. Call light within reach and uses appropriately.   Plan:  TBD    Additional Info: Monitor HFNC, pt. On droplet precaution. Mir in place. Pain management this shift. Continue POC.                       "

## 2025-03-08 NOTE — PLAN OF CARE
Goal Outcome Evaluation:      Plan of Care Reviewed With: patient    Overall Patient Progress: improvingOverall Patient Progress: improving         VSS on HFNC 45LPM/50 O2. Denies pain. Aox4. Tolerating diet. Skin WDL. Mir WDL with good UOP. No BM

## 2025-03-08 NOTE — PROGRESS NOTES
[facility]    Medicine Progress Note - Hospitalist Service, GOLD TEAM 19    Date of Admission:  2/28/2025    Assessment & Plan   Harrison Thomas is a 79 year old male admitted on 2/28/2025. He has a history of thyroid cancer with lung metastases, Parkinson's disease, neuropathy, glaucoma, osteoporosis, chronic pain, severe COPD, hyperlipidemia, obstructive sleep apnea and coronary artery disease and is admitted for acute on chronic hypoxic and hypercapnic respiratory failure with increased O2 use requiring HFNC and c/f CAP as potential underlying etiology. Pt admitted for further mgmt and work up, stable at time of admission.        # Acute on chronic hypoxic respiratory failure without hypercarbia with exacerbation of COPD coupled with underlying community-acquired pneumonia.  # H/o severe COPD   Pt with history of multiple pulmonary comorbidities including lung mets from thyroid cancer, severe COPD, and with known BRENNEN with recent hospitalization in January for sepsis 2/2 CAP c/b COPD exacerbation. Ddx  including CAP given septic picture vs aspiration induced vs PE vs ACS vs HF exacerbation vs viral etiology for AHHRF vs lack of medication adherence vs more sinister including PTX considered.   - Admitted for acute on chronic hypoxic respiratory failure; etiology unclear. Pulmonary team consulting  - Given concern for underlying CAP, started on CTX, but broadened to Zosyn given concern for HAP plus Azithromycin. This was evaluated by pulmonary team as there was a low concern for bacterial  infectious issue. IV antibiotics have now been stopped and resume azithromycin every other day. Respiratory panel did come back as coronavirus, which may be the triggering factor for this admission   - Initiated on IV Solumedrol 62.5 mg q8h, now to taper to 40 mg daily for 5 days per pulm recommendations    - Duoneb QID with RT. Hold home inhalers while on nebs  -Furosemide 20 mg  BID now on hold ( takes 40 mg BID). Unsure why this is needed, as the patient has a relatively normal echo, and IVC does point towards low volume   -pulm toilet: Flutter valve already ordered. .   - Bilateral US: Negative for DVT    Despite the above, patient was in respiratory distress on the evening of 3/6, needing high flow Oxygen, Volera and Mucomyst nebs. Currently on 45 lts/min if HF  A trial of diuretics was tried with no response       Given severe debilitating COPD, and the effects of opiates (used  for symptom control), one has to achieve the right balance as increased opiates will depress breathing.  Palliative care has assisted with this, and now on scheduled Roxanol 7.5 mg TID with additional 5-10 mg Q 2 hrs PRN. Also has additional lorazepam 0.25 mg Q 6 hrs PRN.  Butrans patch 5 mg started on 3/6     Patient lives in a long term care unit, and may benefit from discussions along the lines of hospice. He is currently going through divorce proceedings in the court   Patient is now wanting to explore options around hospice. Has met with SW and palliative acre with final decisions to be made on Monday     I spoke to daughter Janey and explained the above. She is supportive                 # Elevated lactic acid    likely in the setting of hypoxia. 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 Patient had received radioactive iodine in the past in addition to RT to the lung. 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 TLC noted to be decreased to 74% of predicted at that visit iso his severe COPD and scarring from radiation treatment and volume loss.   -- PTA levothyroxine  112 mcg daily.      # Acute on chronic thrombocytopenia - likely in the setting of acute illness   Unclear etiology. During last hospitalization, thought to be in the setting of infection. Not on heparin, HIT unlikely. PBS with normal platelet morphology 1/29/25, just significantly decreased in number. DDx including medication induced vs immune mediated vs underproduction vs splenic sequestration vs consumption considered. No valorie bleeding and decreased concern for PE on admission making consumptive process unlikely. Hgb stable as well.   -- Given improvement in thrombocytopenia, resume PTA aspirin.  -- Continue to monitor CBC daily for the time being. Platelet count has improved to 101     # BRENNEN   Refused BiPAP here, unclear if CPAP at home. HFNC overnight, CTM.      # CAD   # HLD   # HTN   # H/o SAH 2023   -- Resume PTA amlodipine 5mg at bedtime   -- Resume PTA rosuvastatin.  -- PT aspirin resumed.  - lasix 20 mg BID on hold     Hypernatremia:  Likely due to dehydration. Hold lasix for now    Sodium at 143 on 3/7       # Parkinsons disease   Has seen neurology and on sinemet. Patient endorses Left side is weaker than the right. On physical exam, mild rigidity in upper extremities, left more than right. Able to bring both knees to chest without pain in bilateral hips. Gross motor and strength intact.   -Continue PTA sinemet TID      # Chronic pain   # Low back pain   # Neuropathy   Continue PTA chronic pain medications including Gabapentin, Morphine sulfate, APAP prn.  Given possible respiratory depression from 5 times daily opioids, we will reduce this to 3 times daily and monitor pain control. Discussed this with daughter, who is Ok with the taper           Diet: Combination Diet Regular Diet Adult  Snacks/Supplements Adult: Ensure Enlive; Between Meals    DVT Prophylaxis: Pneumatic Compression Devices  Mir Catheter: PRESENT, indication: Acute retention or obstruction  Lines: None     Cardiac Monitoring:  None  Code Status: No CPR- Do NOT Intubate      Clinically Significant Risk Factors               # Hypoalbuminemia: Lowest albumin = 3.4 g/dL at 3/1/2025  6:21 AM, will monitor as appropriate   # Thrombocytopenia: Lowest platelets = 101 in last 2 days, will monitor for bleeding   # Hypertension: Noted on problem list                # Financial/Environmental Concerns: none         Social Drivers of Health    Tobacco Use: Medium Risk (2/28/2025)    Patient History     Smoking Tobacco Use: Former     Smokeless Tobacco Use: Never   Physical Activity: Inactive (10/14/2024)    Exercise Vital Sign     Days of Exercise per Week: 0 days     Minutes of Exercise per Session: 0 min   Stress: Stress Concern Present (10/14/2024)    [employer] [place] [employer] - Occupational Stress Questionnaire     Feeling of Stress : Very much   Social Connections: Unknown (10/14/2024)    Social Connection and Isolation Panel [NHANES]     Frequency of Social Gatherings with Friends and Family: More than three times a week          Disposition Plan     Medically Ready for Discharge: Anticipated in 2-4 Days        Elizabeth Sneed MD  Hospitalist Service, GOLD TEAM [address] [place] [address]  Securely message with [employer] (more info)  Text page via [facility] Paging/Directory   See signed in provider for up to date coverage information  ______________________________________________________________________    Interval History   Chart reviewed and patient was examined.    No new issues. Patient continues to think about hospice  Allowed me to talk to his daughter today       Physical Exam   Vital Signs: Temp: 98.7  F (37.1  C) Temp src: Oral BP: (!) 159/81 Pulse: 92   Resp: 16 SpO2: 92 % O2 Device: High Flow Nasal Cannula (HFNC) Oxygen Delivery: 65 LPM  Weight: 160 lbs 11.45 oz    General Appearance: Sitting in bed, on high flow nasal cannula.  HEENT: PERRL: EOMI; moist mucous membrane w/o  lesions  Neck: No JVD  Pulmonary: significant upper airway sounds.  CVS: Regular rhythm, no murmurs, rubs or gallops  GI: BS (+), soft nontender, no rebound or guarding   Extremities: No LE edema   Skin: Diffuse ecchymosis bilateral upper and lower extremity.  Neurologic: A&O x3      Medical Decision Making       35 MINUTES SPENT BY ME on the date of service doing chart review, history, exam, documentation & further activities per the note.    Discussed with bedside RN, care management and daughter ( via phone)  Data   ------------------------- PAST 24 HR DATA REVIEWED -----------------------------------------------        Imaging results reviewed over the past 24 hrs:   No results found for this or any previous visit (from the past 24 hours).

## 2025-03-09 LAB
GLUCOSE BLDC GLUCOMTR-MCNC: 113 MG/DL (ref 70–99)
GLUCOSE BLDC GLUCOMTR-MCNC: 126 MG/DL (ref 70–99)

## 2025-03-09 PROCEDURE — 250N000009 HC RX 250: Performed by: INTERNAL MEDICINE

## 2025-03-09 PROCEDURE — 94640 AIRWAY INHALATION TREATMENT: CPT | Mod: 76

## 2025-03-09 PROCEDURE — 94799 UNLISTED PULMONARY SVC/PX: CPT

## 2025-03-09 PROCEDURE — 120N000002 HC R&B MED SURG/OB UMMC

## 2025-03-09 PROCEDURE — 250N000013 HC RX MED GY IP 250 OP 250 PS 637

## 2025-03-09 PROCEDURE — 250N000013 HC RX MED GY IP 250 OP 250 PS 637: Performed by: PHYSICIAN ASSISTANT

## 2025-03-09 PROCEDURE — 250N000012 HC RX MED GY IP 250 OP 636 PS 637: Performed by: INTERNAL MEDICINE

## 2025-03-09 PROCEDURE — 94660 CPAP INITIATION&MGMT: CPT

## 2025-03-09 PROCEDURE — 94640 AIRWAY INHALATION TREATMENT: CPT

## 2025-03-09 PROCEDURE — 999N000157 HC STATISTIC RCP TIME EA 10 MIN

## 2025-03-09 PROCEDURE — 250N000013 HC RX MED GY IP 250 OP 250 PS 637: Performed by: INTERNAL MEDICINE

## 2025-03-09 RX ADMIN — GUAIFENESIN 600 MG: 600 TABLET, EXTENDED RELEASE ORAL at 19:45

## 2025-03-09 RX ADMIN — PREDNISONE 30 MG: 20 TABLET ORAL at 09:01

## 2025-03-09 RX ADMIN — ACETYLCYSTEINE 2 ML: 200 SOLUTION ORAL; RESPIRATORY (INHALATION) at 16:34

## 2025-03-09 RX ADMIN — MORPHINE SULFATE 7.5 MG: 20 SOLUTION ORAL at 19:45

## 2025-03-09 RX ADMIN — IPRATROPIUM BROMIDE AND ALBUTEROL SULFATE 3 ML: .5; 3 SOLUTION RESPIRATORY (INHALATION) at 16:34

## 2025-03-09 RX ADMIN — IPRATROPIUM BROMIDE AND ALBUTEROL SULFATE 3 ML: .5; 3 SOLUTION RESPIRATORY (INHALATION) at 03:58

## 2025-03-09 RX ADMIN — MORPHINE SULFATE 7.5 MG: 20 SOLUTION ORAL at 15:22

## 2025-03-09 RX ADMIN — IPRATROPIUM BROMIDE AND ALBUTEROL SULFATE 3 ML: .5; 3 SOLUTION RESPIRATORY (INHALATION) at 20:51

## 2025-03-09 RX ADMIN — ASPIRIN 81 MG: 81 TABLET ORAL at 09:01

## 2025-03-09 RX ADMIN — ACETYLCYSTEINE 2 ML: 200 SOLUTION ORAL; RESPIRATORY (INHALATION) at 09:13

## 2025-03-09 RX ADMIN — MORPHINE SULFATE 7.5 MG: 20 SOLUTION ORAL at 08:59

## 2025-03-09 RX ADMIN — ACETYLCYSTEINE 2 ML: 200 SOLUTION ORAL; RESPIRATORY (INHALATION) at 20:51

## 2025-03-09 RX ADMIN — GABAPENTIN 400 MG: 300 CAPSULE ORAL at 15:22

## 2025-03-09 RX ADMIN — IPRATROPIUM BROMIDE AND ALBUTEROL SULFATE 3 ML: .5; 3 SOLUTION RESPIRATORY (INHALATION) at 23:41

## 2025-03-09 RX ADMIN — METHOCARBAMOL 750 MG: 750 TABLET ORAL at 17:26

## 2025-03-09 RX ADMIN — PANTOPRAZOLE SODIUM 40 MG: 40 TABLET, DELAYED RELEASE ORAL at 09:00

## 2025-03-09 RX ADMIN — LEVOTHYROXINE SODIUM 125 MCG: 125 TABLET ORAL at 09:01

## 2025-03-09 RX ADMIN — Medication 25 MCG: at 09:01

## 2025-03-09 RX ADMIN — ACETYLCYSTEINE 2 ML: 200 SOLUTION ORAL; RESPIRATORY (INHALATION) at 03:58

## 2025-03-09 RX ADMIN — PANTOPRAZOLE SODIUM 40 MG: 40 TABLET, DELAYED RELEASE ORAL at 17:26

## 2025-03-09 RX ADMIN — Medication 0.25 MG: at 17:26

## 2025-03-09 RX ADMIN — GUAIFENESIN 600 MG: 600 TABLET, EXTENDED RELEASE ORAL at 09:00

## 2025-03-09 RX ADMIN — AMLODIPINE BESYLATE 5 MG: 5 TABLET ORAL at 21:33

## 2025-03-09 RX ADMIN — AZITHROMYCIN DIHYDRATE 500 MG: 250 TABLET ORAL at 08:59

## 2025-03-09 RX ADMIN — ACETYLCYSTEINE 2 ML: 200 SOLUTION ORAL; RESPIRATORY (INHALATION) at 00:45

## 2025-03-09 RX ADMIN — ATOVAQUONE 1500 MG: 750 SUSPENSION ORAL at 09:06

## 2025-03-09 RX ADMIN — GABAPENTIN 400 MG: 300 CAPSULE ORAL at 09:00

## 2025-03-09 RX ADMIN — IPRATROPIUM BROMIDE AND ALBUTEROL SULFATE 3 ML: .5; 3 SOLUTION RESPIRATORY (INHALATION) at 09:13

## 2025-03-09 RX ADMIN — ACETYLCYSTEINE 2 ML: 200 SOLUTION ORAL; RESPIRATORY (INHALATION) at 23:41

## 2025-03-09 RX ADMIN — GABAPENTIN 400 MG: 300 CAPSULE ORAL at 19:45

## 2025-03-09 RX ADMIN — ROSUVASTATIN 10 MG: 10 TABLET, FILM COATED ORAL at 08:59

## 2025-03-09 RX ADMIN — IPRATROPIUM BROMIDE AND ALBUTEROL SULFATE 3 ML: .5; 3 SOLUTION RESPIRATORY (INHALATION) at 00:45

## 2025-03-09 ASSESSMENT — ACTIVITIES OF DAILY LIVING (ADL)
ADLS_ACUITY_SCORE: 65

## 2025-03-09 NOTE — PROGRESS NOTES
[facility]    Medicine Progress Note - Hospitalist Service, GOLD TEAM 19    Date of Admission:  2/28/2025    Assessment & Plan   Harrison Thomas is a 79 year old male admitted on 2/28/2025. He has a history of thyroid cancer with lung metastases, Parkinson's disease, neuropathy, glaucoma, osteoporosis, chronic pain, severe COPD, hyperlipidemia, obstructive sleep apnea and coronary artery disease and is admitted for acute on chronic hypoxic and hypercapnic respiratory failure with increased O2 use requiring HFNC and c/f CAP as potential underlying etiology. Pt admitted for further mgmt and work up, stable at time of admission.        # Acute on chronic hypoxic respiratory failure without hypercarbia with exacerbation of COPD coupled with underlying community-acquired pneumonia.  # H/o severe COPD   Pt with history of multiple pulmonary comorbidities including lung mets from thyroid cancer, severe COPD, and with known BRENNEN with recent hospitalization in January for sepsis 2/2 CAP c/b COPD exacerbation. Ddx  including CAP given septic picture vs aspiration induced vs PE vs ACS vs HF exacerbation vs viral etiology for AHHRF vs lack of medication adherence vs more sinister including PTX considered.   - Admitted for acute on chronic hypoxic respiratory failure; etiology unclear. Pulmonary team consulting  - Given concern for underlying CAP, started on CTX, but broadened to Zosyn given concern for HAP plus Azithromycin. This was evaluated by pulmonary team as there was a low concern for bacterial  infectious issue. IV antibiotics have now been stopped and resume azithromycin every other day. Respiratory panel did come back as coronavirus, which may be the triggering factor for this admission   - Initiated on IV Solumedrol 62.5 mg q8h, now to taper to 40 mg daily for 5 days per pulm recommendations    - Duoneb QID with RT. Hold home inhalers while on nebs  -Furosemide 20 mg  BID now on hold ( takes 40 mg BID). Unsure why this is needed, as the patient has a relatively normal echo, and IVC does point towards low volume   -pulm toilet: Flutter valve already ordered. .   - Bilateral US: Negative for DVT    Despite the above, patient was in respiratory distress on the evening of 3/6, needing high flow Oxygen, Volera and Mucomyst nebs. Currently on 45 lts/min if HF  A trial of diuretics was tried with no response       Given severe debilitating COPD, and the effects of opiates (used  for symptom control), one has to achieve the right balance as increased opiates will depress breathing.  Palliative care has assisted with this, and now on scheduled Roxanol 7.5 mg TID with additional 5-10 mg Q 2 hrs PRN. Also has additional lorazepam 0.25 mg Q 6 hrs PRN.  Butrans patch 5 mg started on 3/6     Patient lives in a long term care unit, and may benefit from discussions along the lines of hospice. He is currently going through divorce proceedings in the court   Patient is now wanting to explore options around hospice. Has met with SW and palliative acre with final decisions to be made on Monday    Discussed with daughter Janey               # Elevated lactic acid    likely in the setting of hypoxia. 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 Patient had received radioactive iodine in the past in addition to RT to the lung. 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 TLC noted to be decreased to 74% of predicted at that visit iso his severe COPD and scarring from radiation treatment and volume loss.   -- PTA levothyroxine 112 mcg daily.      # Acute on chronic  thrombocytopenia - likely in the setting of acute illness   Unclear etiology. During last hospitalization, thought to be in the setting of infection. Not on heparin, HIT unlikely. PBS with normal platelet morphology 1/29/25, just significantly decreased in number. DDx including medication induced vs immune mediated vs underproduction vs splenic sequestration vs consumption considered. No valorie bleeding and decreased concern for PE on admission making consumptive process unlikely. Hgb stable as well.   -- Given improvement in thrombocytopenia, resume PTA aspirin.  -- Continue to monitor CBC daily for the time being. Platelet count has improved to 101     # BRENNEN   Refused BiPAP here, unclear if CPAP at home. HFNC overnight, CTM.      # CAD   # HLD   # HTN   # H/o SAH 2023   -- Resume PTA amlodipine 5mg at bedtime   -- Resume PTA rosuvastatin.  -- PT aspirin resumed.  - lasix 20 mg BID on hold     Hypernatremia:  Likely due to dehydration. Hold lasix for now    Sodium at 143 on 3/7       # Parkinsons disease   Has seen neurology and on sinemet. Patient endorses Left side is weaker than the right. On physical exam, mild rigidity in upper extremities, left more than right. Able to bring both knees to chest without pain in bilateral hips. Gross motor and strength intact.   -Continue PTA sinemet TID      # Chronic pain   # Low back pain   # Neuropathy   Continue PTA chronic pain medications including Gabapentin, Morphine sulfate, APAP prn.  Given possible respiratory depression from 5 times daily opioids, we will reduce this to 3 times daily and monitor pain control. Discussed this with daughter, who is Ok with the taper           Diet: Combination Diet Regular Diet Adult  Snacks/Supplements Adult: Ensure Enlive; Between Meals    DVT Prophylaxis: Pneumatic Compression Devices  Mir Catheter: PRESENT, indication: Acute retention or obstruction  Lines: None     Cardiac Monitoring: None  Code Status: No CPR- Do NOT Intubate       Clinically Significant Risk Factors               # Hypoalbuminemia: Lowest albumin = 3.4 g/dL at 3/1/2025  6:21 AM, will monitor as appropriate     # Hypertension: Noted on problem list                # Financial/Environmental Concerns: none         Social Drivers of Health    Tobacco Use: Medium Risk (2/28/2025)    Patient History     Smoking Tobacco Use: Former     Smokeless Tobacco Use: Never   Physical Activity: Inactive (10/14/2024)    Exercise Vital Sign     Days of Exercise per Week: 0 days     Minutes of Exercise per Session: 0 min   Stress: Stress Concern Present (10/14/2024)    [employer] [place] [employer] - Occupational Stress Questionnaire     Feeling of Stress : Very much   Social Connections: Unknown (10/14/2024)    Social Connection and Isolation Panel [NHANES]     Frequency of Social Gatherings with Friends and Family: More than three times a week          Disposition Plan     Medically Ready for Discharge: Anticipated in 2-4 Days        Elizabeth Sneed MD  Hospitalist Service, GOLD TEAM 19  [facility]  Securely message with [employer] (more info)  Text page via [employer] Paging/Directory   See signed in provider for up to date coverage information  ______________________________________________________________________    Interval History   Chart reviewed and patient was examined.    No new issues. Patient continues to think about hospice  Allowed me to talk to his daughter today       Physical Exam   Vital Signs: Temp: 97.7  F (36.5  C) Temp src: Oral BP: (!) 152/78 Pulse: 95   Resp: 20 SpO2: 97 % O2 Device: High Flow Nasal Cannula (HFNC) Oxygen Delivery: 45 LPM  Weight: 160 lbs 11.45 oz    General Appearance: Sitting in bed, on high flow nasal cannula.  HEENT: PERRL: EOMI; moist mucous membrane w/o lesions  Neck: No JVD  Pulmonary: significant upper airway sounds.  CVS: Regular rhythm, no murmurs, rubs or gallops  GI: BS (+), soft  nontender, no rebound or guarding   Extremities: No LE edema   Skin: Diffuse ecchymosis bilateral upper and lower extremity.  Neurologic: A&O x3      Medical Decision Making       35 MINUTES SPENT BY ME on the date of service doing chart review, history, exam, documentation & further activities per the note.    Discussed with bedside RN, care management and daughter ( via phone)  Data   ------------------------- PAST 24 HR DATA REVIEWED -----------------------------------------------        Imaging results reviewed over the past 24 hrs:   No results found for this or any previous visit (from the past 24 hours).

## 2025-03-09 NOTE — PLAN OF CARE
Problem: Adult Inpatient Plan of Care  Goal: Optimal Comfort and Wellbeing  Outcome: Progressing  Intervention: Monitor Pain and Promote Comfort  Recent Flowsheet Documentation  Taken 3/8/2025 1611 by Hermelinda Ly RN  Pain Management Interventions: medication (see MAR)  Taken 3/8/2025 0802 by Hermelinda Ly RN  Pain Management Interventions: medication (see MAR)  Intervention: Provide Person-Centered Care  Recent Flowsheet Documentation  Taken 3/8/2025 0830 by Hermelinda Ly RN  Trust Relationship/Rapport:   care explained   choices provided  Goal: Readiness for Transition of Care  Outcome: Progressing  Flowsheets (Taken 3/8/2025 1843)  Anticipated Changes Related to Illness: none  Transportation Anticipated: agency  Concerns to be Addressed: discharge planning  Intervention: Mutually Develop Transition Plan  Recent Flowsheet Documentation  Taken 3/8/2025 1843 by Hermelinda Ly RN  Anticipated Changes Related to Illness: none  Transportation Anticipated: agency  Concerns to be Addressed: discharge planning     Problem: Comorbidity Management  Goal: Maintenance of COPD Symptom Control  Outcome: Progressing  Intervention: Maintain COPD Symptom Control  Recent Flowsheet Documentation  Taken 3/8/2025 0830 by Hermelinda Ly RN  Medication Review/Management: medications reviewed     Problem: Gas Exchange Impaired  Goal: Optimal Gas Exchange  Outcome: Progressing  Intervention: Optimize Oxygenation and Ventilation  Recent Flowsheet Documentation  Taken 3/8/2025 0830 by Hermelinda Ly RN  Head of Bed (HOB) Positioning: HOB at 60-90 degrees     Problem: Delirium  Goal: Optimal Coping  Outcome: Progressing  Intervention: Optimize Psychosocial Adjustment to Delirium  Recent Flowsheet Documentation  Taken 3/8/2025 0830 by Hermelinda Ly RN  Family/Support System Care: self-care encouraged  Goal: Improved Behavioral Control  Outcome: Progressing  Intervention: Prevent and Manage  "Agitation  Recent Flowsheet Documentation  Taken 3/8/2025 0830 by Hermelinda Ly RN  Environment Familiarity/Consistency: daily routine followed  Intervention: Minimize Safety Risk  Recent Flowsheet Documentation  Taken 3/8/2025 0830 by Hermelinda Ly RN  Enhanced Safety Measures:   pain management   review medications for side effects with activity  Trust Relationship/Rapport:   care explained   choices provided  Goal: Improved Attention and Thought Clarity  Outcome: Progressing  Goal: Improved Sleep  Outcome: Progressing   Goal Outcome Evaluation:         VS: BP (!) 151/91 (BP Location: Left arm)   Pulse 101   Temp 97.6  F (36.4  C) (Oral)   Resp 18   Ht 1.778 m (5' 10\")   Wt 72.9 kg (160 lb 11.5 oz)   SpO2 96%   BMI 23.06 kg/m       O2: Sating >90% on HFNC 55 L 54%.   Output: Mir in place    Last BM: LBM 3/8   Activity: Up with SBA    Skin: Reference flowsheet     Pain: Pain was managed with scheduled Oral morphine and PRN Robaxin,Ativan.      CMS: A&Ox4.   Dressing: N/A   Diet: Regular.diet, spot check BG during shift    LDA: PIV to R SL    Equipment: IV pole, FWW, gait belt, and personal belongings. Call light within reach and uses appropriately.   Plan:  TBD possible hospices or palliative care    Additional Info: During shift pt. Complained about being SOB, HFNC was increased several times Bipap offered pt. Declined. MD paged about situation. Ativan given  and appeared to be effective. Continue POC.                        " No

## 2025-03-09 NOTE — PLAN OF CARE
Goal Outcome Evaluation:    Patient is A/O x4. On high flow NC, 55 LMP/54%. LBM 3/8/25. Mir. Denies pain this shift. Refused Lidocaine patch. Generalized bruising, redness all over body. R-Piv flushed, c/d/I. Droplet precaution maintained. Patient was observed not sleeping; sitting up in bed all night. Bed alarm on for safety.Call light within reach. No acute event this shift. Continue to monitor.

## 2025-03-09 NOTE — PROGRESS NOTES
End of Shift Summary.     Changes this shift:     Neuro: Afebrile, A&Ox4  Cardiac: WNL    Respiratory: On HFNC, 55 LMP/54%   GI/: LBM 3/8   Pain: Managed with scheduled meds/PRN's  Skin: generalized bruising and cabs  LDA's: PIV x1, martinez in place  Activities: Up with SBA    Drips:   None       Plan: Continue POC

## 2025-03-10 PROCEDURE — 250N000009 HC RX 250: Performed by: INTERNAL MEDICINE

## 2025-03-10 PROCEDURE — 94660 CPAP INITIATION&MGMT: CPT

## 2025-03-10 PROCEDURE — 999N000157 HC STATISTIC RCP TIME EA 10 MIN

## 2025-03-10 PROCEDURE — 99233 SBSQ HOSP IP/OBS HIGH 50: CPT

## 2025-03-10 PROCEDURE — 94640 AIRWAY INHALATION TREATMENT: CPT | Mod: 76

## 2025-03-10 PROCEDURE — 94799 UNLISTED PULMONARY SVC/PX: CPT

## 2025-03-10 PROCEDURE — 250N000013 HC RX MED GY IP 250 OP 250 PS 637

## 2025-03-10 PROCEDURE — 120N000002 HC R&B MED SURG/OB UMMC

## 2025-03-10 PROCEDURE — 94640 AIRWAY INHALATION TREATMENT: CPT

## 2025-03-10 PROCEDURE — 250N000013 HC RX MED GY IP 250 OP 250 PS 637: Performed by: INTERNAL MEDICINE

## 2025-03-10 PROCEDURE — 999N000111 HC STATISTIC OT IP EVAL DEFER: Performed by: OCCUPATIONAL THERAPIST

## 2025-03-10 PROCEDURE — 99232 SBSQ HOSP IP/OBS MODERATE 35: CPT | Performed by: INTERNAL MEDICINE

## 2025-03-10 PROCEDURE — 250N000012 HC RX MED GY IP 250 OP 636 PS 637: Performed by: INTERNAL MEDICINE

## 2025-03-10 RX ORDER — MORPHINE SULFATE 20 MG/ML
5-10 SOLUTION ORAL EVERY 4 HOURS PRN
Status: DISCONTINUED | OUTPATIENT
Start: 2025-03-10 | End: 2025-03-12 | Stop reason: HOSPADM

## 2025-03-10 RX ADMIN — IPRATROPIUM BROMIDE AND ALBUTEROL SULFATE 3 ML: .5; 3 SOLUTION RESPIRATORY (INHALATION) at 04:06

## 2025-03-10 RX ADMIN — POLYETHYLENE GLYCOL 3350 17 G: 17 POWDER, FOR SOLUTION ORAL at 19:33

## 2025-03-10 RX ADMIN — IPRATROPIUM BROMIDE AND ALBUTEROL SULFATE 3 ML: .5; 3 SOLUTION RESPIRATORY (INHALATION) at 21:03

## 2025-03-10 RX ADMIN — METHOCARBAMOL 750 MG: 750 TABLET ORAL at 00:00

## 2025-03-10 RX ADMIN — ACETYLCYSTEINE 2 ML: 200 SOLUTION ORAL; RESPIRATORY (INHALATION) at 12:30

## 2025-03-10 RX ADMIN — PANTOPRAZOLE SODIUM 40 MG: 40 TABLET, DELAYED RELEASE ORAL at 15:51

## 2025-03-10 RX ADMIN — ROSUVASTATIN 10 MG: 10 TABLET, FILM COATED ORAL at 08:16

## 2025-03-10 RX ADMIN — GUAIFENESIN 600 MG: 600 TABLET, EXTENDED RELEASE ORAL at 08:17

## 2025-03-10 RX ADMIN — ATOVAQUONE 1500 MG: 750 SUSPENSION ORAL at 08:23

## 2025-03-10 RX ADMIN — GABAPENTIN 400 MG: 300 CAPSULE ORAL at 14:20

## 2025-03-10 RX ADMIN — AMLODIPINE BESYLATE 5 MG: 5 TABLET ORAL at 21:55

## 2025-03-10 RX ADMIN — GABAPENTIN 400 MG: 300 CAPSULE ORAL at 08:16

## 2025-03-10 RX ADMIN — ACETYLCYSTEINE 2 ML: 200 SOLUTION ORAL; RESPIRATORY (INHALATION) at 16:10

## 2025-03-10 RX ADMIN — MORPHINE SULFATE 10 MG: 20 SOLUTION ORAL at 17:01

## 2025-03-10 RX ADMIN — IPRATROPIUM BROMIDE AND ALBUTEROL SULFATE 3 ML: .5; 3 SOLUTION RESPIRATORY (INHALATION) at 08:16

## 2025-03-10 RX ADMIN — GABAPENTIN 400 MG: 300 CAPSULE ORAL at 19:35

## 2025-03-10 RX ADMIN — MORPHINE SULFATE 7.5 MG: 20 SOLUTION ORAL at 08:16

## 2025-03-10 RX ADMIN — METHOCARBAMOL 750 MG: 750 TABLET ORAL at 19:39

## 2025-03-10 RX ADMIN — ACETYLCYSTEINE 2 ML: 200 SOLUTION ORAL; RESPIRATORY (INHALATION) at 08:16

## 2025-03-10 RX ADMIN — ACETYLCYSTEINE 2 ML: 200 SOLUTION ORAL; RESPIRATORY (INHALATION) at 21:03

## 2025-03-10 RX ADMIN — PANTOPRAZOLE SODIUM 40 MG: 40 TABLET, DELAYED RELEASE ORAL at 08:16

## 2025-03-10 RX ADMIN — IPRATROPIUM BROMIDE AND ALBUTEROL SULFATE 3 ML: .5; 3 SOLUTION RESPIRATORY (INHALATION) at 16:10

## 2025-03-10 RX ADMIN — IPRATROPIUM BROMIDE AND ALBUTEROL SULFATE 3 ML: .5; 3 SOLUTION RESPIRATORY (INHALATION) at 12:30

## 2025-03-10 RX ADMIN — GUAIFENESIN 600 MG: 600 TABLET, EXTENDED RELEASE ORAL at 19:35

## 2025-03-10 RX ADMIN — Medication 0.25 MG: at 00:00

## 2025-03-10 RX ADMIN — ASPIRIN 81 MG: 81 TABLET ORAL at 08:16

## 2025-03-10 RX ADMIN — PREDNISONE 30 MG: 20 TABLET ORAL at 08:23

## 2025-03-10 RX ADMIN — MORPHINE SULFATE 10 MG: 20 SOLUTION ORAL at 20:55

## 2025-03-10 RX ADMIN — Medication 25 MCG: at 08:16

## 2025-03-10 RX ADMIN — LEVOTHYROXINE SODIUM 125 MCG: 125 TABLET ORAL at 08:16

## 2025-03-10 RX ADMIN — METHOCARBAMOL 750 MG: 750 TABLET ORAL at 14:21

## 2025-03-10 ASSESSMENT — ACTIVITIES OF DAILY LIVING (ADL)
ADLS_ACUITY_SCORE: 65
ADLS_ACUITY_SCORE: 63
ADLS_ACUITY_SCORE: 65
ADLS_ACUITY_SCORE: 65
ADLS_ACUITY_SCORE: 63
ADLS_ACUITY_SCORE: 65
ADLS_ACUITY_SCORE: 63
ADLS_ACUITY_SCORE: 65
ADLS_ACUITY_SCORE: 63

## 2025-03-10 NOTE — PLAN OF CARE
Physical Therapy Discharge Summary    Reason for therapy discharge:    Discharged to long term care facility.    Progress towards therapy goal(s). See goals on Care Plan in [facility] electronic health record for goal details.  Goals not met.  Barriers to achieving goals:   limited tolerance for therapy.    Therapy recommendation(s):    No further therapy is recommended.    Goal Outcome Evaluation:

## 2025-03-10 NOTE — PROGRESS NOTES
Care Management Follow Up    Length of Stay (days): 10    Expected Discharge Date: 03/10/2025     Concerns to be Addressed: discharge planning     Patient plan of care discussed at interdisciplinary rounds: Yes    Anticipated Discharge Disposition: Long Term Care with hospice     [facility]   [address] [place] [address]  P: [phone]   Adm: [phone]  F: [phone]     [facility]  Ph: [phone]        Anticipated Discharge Services: None  Anticipated Discharge DME: None    Patient/family educated on Medicare website which has current facility and service quality ratings: no  Education Provided on the Discharge Plan: Yes  Patient/Family in Agreement with the Plan: yes    Referrals Placed by CM/SW:    Private pay costs discussed: Not applicable    Discussed  Partnership in Safe Discharge Planning  document with patient/family: No     Handoff Completed: No, handoff not indicated or clinically appropriate    Additional Information:  Per provider, pt is wanting to return to LTC on hospice and can discharge when hospice is secured. JARAD [facility] accepted pt with SOC tomorrow or Wednesday (3/11 or 3/12). JARAD met with pt at bedside to update. Pt had no questions for SW at this time and confirmed he is ready began hospice care. Pt asked if we could aim for Wednesday the 12th so his daughter can be present, and asked SW to confirm with his daughter Janey. JARAD confirmed a Wednesday discharge with provider and [facility], who asked SW to set up a ride between 10:30-11:30 AM for SOC at noon. JARAD called pts daughter, did not receive answer but LVM.     Addendum 3PM: JARAD received a call back from Janey. Janey asked if we could do admission tomorrow as it would work better for her work schedule. JARAD asked [facility], however they could not do tomorrow. The latest SOC time would be 2 PM Wednesday. JARAD  passed this message along to Janey. Janey was wondering if pt would be able to be on BIPAP during hospice. [facility] will reach out to Janey with this question.     Pt is currently on 54%/45LPM HFNC. JARAD called [facility] DON ([phone]) asking what level of oxygen they are able to manage, did not receive an answer but LVM asking for a call back. Anticipate can manage somewhere around 4-5 LMP. JARAD messaged primary provider to see if it was possible to wean pt.   With HFNC, pt would need ALS truck transport. Tentative ride set up for 3/12 between 12:11-12:56 PM, however will most likely need to be changed when/if pt is weaned off HFNC.    Provider indicated we will begain to wean pts O2 tomorrow morning.     Next Steps:   -IMM  -Send orders to [facility] and [facility] (Fax: [phone])  -Reschedule transport  -Need to wean o2 for admission to LTC  -Discuss plan with pts daughter     Zandra Costa, SW  Josu[facility]   [facility]  Available via [employer]  Covering 5MS JARAD  Ph: [phone]

## 2025-03-10 NOTE — PLAN OF CARE
Goal Outcome Evaluation:      Diagnosis- End stage COPD     Patient alert & Oriented x4  Patient in 4.5 Liters NC   SBA with walker/gait belt.  Mir in place with adequate output-See flowsheet  SOB with any movement  Family at bedside  Regular diet-Good appetite      Call light within reach, able to make needs known.    Plan- Discharging Wednesday to LTC with hospice.

## 2025-03-10 NOTE — PROGRESS NOTES
CLINICAL NUTRITION SERVICES - REASSESSMENT NOTE     RECOMMENDATIONS FOR MDs/PROVIDERS TO ORDER:  None at this time    Malnutrition Status:    Patient does not meet two of the established criteria necessary for diagnosing malnutrition but is at risk for malnutrition    Registered Dietitian Interventions:  Ensure Enlive BID Vanilla    Future/Additional Recommendations:  Monitor labs, stooling, weight trends, intakes, supplement acceptance     SUBJECTIVE INFORMATION  Assessed patient in room.    CURRENT NUTRITION ORDERS  Diet: Regular    CURRENT INTAKE/TOLERANCE    Intake/Tolerance: Good 75%-100% per I/Os  Per [facility] meal order review: Pt has been ordering 2-3 full meals per day  Pt reports consuming supplements as ordered.    NEW FINDINGS  Weight: No updated weights since admission  Wt Readings from Last 15 Encounters:   03/01/25 72.9 kg (160 lb 11.5 oz)   02/24/25 70.2 kg (154 lb 12.2 oz)   02/16/25 70.2 kg (154 lb 12.2 oz)   10/24/24 71.5 kg (157 lb 9.6 oz)   10/18/24 70.8 kg (156 lb)   09/23/24 70.9 kg (156 lb 6 oz)   09/20/24 70.8 kg (156 lb)   07/18/24 73 kg (161 lb)   06/19/24 73 kg (161 lb)   05/30/24 70.8 kg (156 lb)   05/14/24 77.1 kg (170 lb)   05/10/24 77.1 kg (170 lb)   05/08/24 77.5 kg (170 lb 12.8 oz)   05/01/24 78.6 kg (173 lb 3.2 oz)   04/29/24 78.7 kg (173 lb 6.4 oz)     Skin/wounds: No significant skin impairments noted  GI symptoms: Diarrhea  Last BM: 3/08, loose  Bowel regimen: Scheduled    Nutrition-relevant labs: Reviewed  Nutrition-relevant medications: Reviewed    MALNUTRITION  % Intake: Decreased intake does not meet criteria  % Weight Loss: Weight loss does not meet criteria   Subcutaneous Fat Loss: None observed  Muscle Loss: Shoulders (deltoids): Mild and Thigh (quadriceps): Mild  Fluid Accumulation/Edema: None noted  Malnutrition Diagnosis: Patient does not meet two of the established criteria necessary for diagnosing malnutrition but is at risk for malnutrition  Malnutrition Present  on Admission: No    EVALUATION OF THE PROGRESS TOWARD GOALS   Previous Goals  Patient to consume % of nutritionally adequate meal trays TID, or the equivalent with supplements/snacks.  Evaluation: Met    Previous Nutrition Diagnosis  Increased nutrient needs kcals  related to hypercatabolic disease as evidenced by h/o severe COPD   Evaluation: No change    NUTRITION DIAGNOSIS  Increased nutrient needs kcals  related to hypercatabolic disease as evidenced by h/o severe COPD     INTERVENTIONS  Medical food supplement therapy    Goals  Patient to consume % of nutritionally adequate meal trays TID, or the equivalent with supplements/snacks.     Monitoring/Evaluation      Progress toward goals will be monitored and evaluated per policy.    Taylor CHA  Clinical Dietitian  [facility] 5B/10A ICU Vocera, Teams, or desk [phone]  Weekend/Holiday Vocera: Weekend Holiday Clinical Dietitian [Multi Site Groups]

## 2025-03-10 NOTE — PROGRESS NOTES
PALLIATIVE CARE PROGRESS NOTE  [facility]     Patient Name: Harrison Thomas  Date of Admission: 2/28/2025   Today the patient was seen for: ongoing goals of care, symptoms      Recommendations & Counseling       GOALS OF CARE:   Life-prolonging with limits  (DNR/DNI). Hospice at discharge.  Met with Harrison at bedside this morning, he is in good spirit. Harrison affirms discharge back to his long-term care facility with hospice this Wednesday.     ADVANCE CARE PLANNING:  Patient has an advance directive dated 2/18/25.  Primary Health Care Agent Janey Thomas (Daughter).  Alternate(s) na.   There is a POLST form on file, this was reviewed and current.  Code status: No CPR- Do NOT Intubate     MEDICAL MANAGEMENT:   #dyspnea  Patient with debilitating dyspnea, end-stage COPD.  Transitioned to butrans patch on 3/6. Harrison finds butrans patch helpful. Stopped scheduled morphine today, continue PRN morphine with decreased frequency q4hrs as needed for dyspnea, air hunger.     Opioid use in the last 24 hours: Total OME (Morphine 30 mg) + Butrans patch 5 mcg/hr.   Morphine (Roxanol) 7.5 mg p.o. 3 times daily (stopped on 3/10)  Decrease morphine (Roxanol) 5-10 mg q2hr prn >q4hrs as needed on 3/10.   Lorazepam 0.25 mg q6hr prn   Butrans patch 5 mcg/hr started on 3/6   Acetaminophen (Tylenol) 650 mg every 4 hours as needed for mild pain  Continue Mucinex 600 oral twice daily  Continue DuoNebs 4 times daily and q4hr prn  Gabapentin 400 mg oral 3 times daily  Antibiotic management, steroid, and diuresis as per primary  Fan at bedside      #At risk for opioid-induced constipation   Continue MiraLAX daily as needed  Continue Senokot 1-2 tab twice daily as needed    Palliative Care will continue to follow. Thank you for the consult and allowing us to aid in the care of Harrison Thomas.    These recommendations have been discussed with primary team, bedside nurse.    Janel Jain,  APRN CNP  [facility], Palliative Care  Securely message with the [employer] Web Console (learn more here) or  Text page via [facility] Paging/Directory        Assessment          Harrison Thomas is a 79 year old male admitted on 2/28/2025 history of thyroid cancer with lung metastases, Parkinson's disease, neuropathy, glaucoma, osteoporosis, chronic pain, severe COPD, hyperlipidemia, obstructive sleep apnea and coronary artery disease and is admitted for acute on chronic hypoxic and hypercapnic respiratory failure with increased O2 use requiring HFNC and c/f CAP as potential underlying etiology. Pt admitted for further mgmt and work up.      Mr. Thomas is well known to our service and was last seen by my colleague Dr. Wilhelm 2/6/25. Our team is consulted again for symptom management and for ongoing goals of care discussion. Previously recommended hospice care given his end-stage COPD and thyroid cancer with lung metastases.      Today, the patient was seen for:  #Acute on chronic hypoxic respiratory failure without hypercarbia, COPD exacerbation, likely underlying CAP  #History of severe COPD  #Thyroid cancer with lung metastases  #Palliative care encounter  #Goals of care discussion  #Patient and family support      Interval History:     Multidisciplinary collaboration:  Chart reviewed, no major events overnight. Spoke with Dr. Johnson this afternoon. Anticipated discharge likely this Wednesday back to long-term care facility with hospice. Jemima ABRAHAM assistance with hospice referrals.      Notable medications:  Gabapentin 400 mg TID  Guaifenesin 600 BID  Duoneb q4h  Morphine 7.5 mg TID  Protonix 40 gm daily  Miralax BID  Prednisone 30 mg daily  Duoneb PRN -x1  Lorazepam 0.25 mg q6h PRN- x2    Patient/family narrative  Harrison reports his breathing is somewhat better. RT and primary RN at bedside. He is transitioning off HFNC to NC at the time of my visit. Harrison shared, he wants to discharge with hospice and has  discussed with his daughter over the weekend and again this afternoon. He states he is at peace with his decision to transition with hospice and expressed good understanding of his lung disease.     Review of Systems:     Besides above, ROS was reviewed and is unremarkable        Physical Exam:   Temp:  [97.7  F (36.5  C)-99.2  F (37.3  C)] 97.7  F (36.5  C)  Pulse:  [95] 95  Resp:  [17-20] 20  BP: (142-152)/(78-81) 152/78  FiO2 (%):  [53 %-54 %] 54 %  SpO2:  [97 %] 97 %  160 lbs 11.45 oz  Constitutional: Alert, awake, sitting up in bed  Respiratory: Non-labored breathing, on high flow nasal cannula  Abdomen: Soft nontender, no distention,+BS  NEURO: Alert and oriented x 3  Psychiatric: Very anxious today  SKIN: Warm/dry, scattered bruises bilateral upper extremity  JOINT/EXTREMITIES: Limited exam, seen patient sitting up in bed. Spontaneous movement of bilateral upper extremity noted        Data Reviewed:     CMP  Recent Labs   Lab 03/09/25  2146 03/09/25  1242 03/07/25  1213 03/07/25  0610 03/06/25  1817 03/06/25  0803   NA  --   --   --  143  --  145   POTASSIUM  --   --   --  4.6  --  4.0   CHLORIDE  --   --   --  102  --  106   CO2  --   --   --  28  --  29   ANIONGAP  --   --   --  13  --  10   * 113*   < > 70   < > 72   BUN  --   --   --  19.3  --  18.3   CR  --   --   --  0.78  --  0.69   GFRESTIMATED  --   --   --  >90  --  >90   KARLIE  --   --   --  9.5  --  9.1    < > = values in this interval not displayed.     CBC  Recent Labs   Lab 03/07/25  0610 03/05/25  0637   WBC 12.0* 7.6   RBC 4.14* 3.92*   HGB 13.1* 12.1*   HCT 40.0 38.6*   MCV 97 99   MCH 31.6 30.9   MCHC 32.8 31.3*   RDW 15.6* 15.9*   * 89*       No results found for this or any previous visit (from the past 24 hours).    Medical Decision Making       55 MINUTES SPENT BY ME on the date of service doing chart review, history, exam, documentation & further activities per the note.

## 2025-03-10 NOTE — PROGRESS NOTES
[facility]    Medicine Progress Note - Hospitalist Service, GOLD TEAM 19    Date of Admission:  2/28/2025    Assessment & Plan   Harrison Thomas is a 79 year old male admitted on 2/28/2025. He has a history of thyroid cancer with lung metastases, Parkinson's disease, neuropathy, glaucoma, osteoporosis, chronic pain, severe COPD, hyperlipidemia, obstructive sleep apnea and coronary artery disease and is admitted for acute on chronic hypoxic and hypercapnic respiratory failure with increased O2 use requiring HFNC and c/f CAP as potential underlying etiology. Pt admitted for further mgmt and work up, stable at time of admission.        # Acute on chronic hypoxic respiratory failure without hypercarbia with exacerbation of COPD coupled with underlying community-acquired pneumonia.  # H/o severe COPD   Pt with history of multiple pulmonary comorbidities including lung mets from thyroid cancer, severe COPD, and with known BRENNEN with recent hospitalization in January for sepsis 2/2 CAP c/b COPD exacerbation. Ddx  including CAP given septic picture vs aspiration induced vs PE vs ACS vs HF exacerbation vs viral etiology for AHHRF vs lack of medication adherence vs more sinister including PTX considered.   - Admitted for acute on chronic hypoxic respiratory failure; etiology unclear. Pulmonary team consulting  - Given concern for underlying CAP, started on CTX, but broadened to Zosyn given concern for HAP plus Azithromycin. This was evaluated by pulmonary team as there was a low concern for bacterial  infectious issue. IV antibiotics have now been stopped and resume azithromycin every other day. Respiratory panel did come back as coronavirus, which may be the triggering factor for this admission   - Initiated on IV Solumedrol 62.5 mg q8h, now to taper to 40 mg daily for 5 days per pulm recommendations    - Duoneb QID with RT. Hold home inhalers while on nebs  -Furosemide 20 mg  BID now on hold ( takes 40 mg BID). Unsure why this is needed, as the patient has a relatively normal echo, and IVC does point towards low volume   -pulm toilet: Flutter valve already ordered. .   - Bilateral US: Negative for DVT    Despite the above, patient was in respiratory distress on the evening of 3/6, needing high flow Oxygen, [place] and Mucomyst nebs. Currently on 45 lts/min if HF  A trial of diuretics was tried with no response   Given severe debilitating COPD, and the effects of opiates (used  for symptom control), one has to achieve the right balance as increased opiates will depress breathing.  Palliative care has assisted with this, and now on scheduled Roxanol 7.5 mg TID with additional 5-10 mg Q 2 hrs PRN. Also has additional lorazepam 0.25 mg Q 6 hrs PRN.  Butrans patch 5 mg started on 3/6  Scheduled morphine TID stopped today (3/10), but viktor still has PRN's      Patient lives in a long term care unit, and may benefit from discussions along the lines of hospice. He is currently going through divorce proceedings in the court   Patient is now wanting to explore options around hospice. Has met with SW and palliative [facility] with final decisions to be made on Monday    Discussed with daughter Janey who agrees with the plan         # Elevated lactic acid    likely in the setting of hypoxia. 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 Patient had received radioactive iodine in the past in addition to RT to the lung. 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 TLC noted to be decreased to 74% of predicted at that visit iso his severe COPD and scarring from radiation  treatment and volume loss.   -- PTA levothyroxine 112 mcg daily.      # Acute on chronic thrombocytopenia - likely in the setting of acute illness   Unclear etiology. During last hospitalization, thought to be in the setting of infection. Not on heparin, HIT unlikely. PBS with normal platelet morphology 1/29/25, just significantly decreased in number. DDx including medication induced vs immune mediated vs underproduction vs splenic sequestration vs consumption considered. No valorie bleeding and decreased concern for PE on admission making consumptive process unlikely. Hgb stable as well.   -- Given improvement in thrombocytopenia, resume PTA aspirin.  -- Continue to monitor CBC daily for the time being. Platelet count has improved to 101     # BRENNEN   Refused BiPAP here, unclear if CPAP at home. HFNC overnight, CTM.      # CAD   # HLD   # HTN   # H/o SAH 2023   -- Resume PTA amlodipine 5mg at bedtime   -- Resume PTA rosuvastatin.  -- PT aspirin resumed.  - lasix 20 mg BID on hold     Hypernatremia:  Likely due to dehydration. Hold lasix for now    Sodium at 143 on 3/7       # Parkinsons disease   Has seen neurology and on sinemet. Patient endorses Left side is weaker than the right. On physical exam, mild rigidity in upper extremities, left more than right. Able to bring both knees to chest without pain in bilateral hips. Gross motor and strength intact.   -Continue PTA sinemet TID      # Chronic pain   # Low back pain   # Neuropathy   Continue PTA chronic pain medications including Gabapentin, Morphine sulfate, APAP prn.  Given possible respiratory depression from 5 times daily opioids, we will reduce this to 3 times daily and monitor pain control. Discussed this with daughter, who is Ok with the taper           Diet: Combination Diet Regular Diet Adult  Snacks/Supplements Adult: Ensure Enlive; Between Meals    DVT Prophylaxis: Pneumatic Compression Devices  Mir Catheter: PRESENT, indication: Acute retention or  obstruction  Lines: None     Cardiac Monitoring: None  Code Status: No CPR- Do NOT Intubate      Clinically Significant Risk Factors               # Hypoalbuminemia: Lowest albumin = 3.4 g/dL at 3/1/2025  6:21 AM, will monitor as appropriate     # Hypertension: Noted on problem list                # Financial/Environmental Concerns: none         Social Drivers of Health    Tobacco Use: Medium Risk (2/28/2025)    Patient History     Smoking Tobacco Use: Former     Smokeless Tobacco Use: Never   Physical Activity: Inactive (10/14/2024)    Exercise Vital Sign     Days of Exercise per Week: 0 days     Minutes of Exercise per Session: 0 min   Stress: Stress Concern Present (10/14/2024)    [employer] [employer] - Occupational Stress Questionnaire     Feeling of Stress : Very much   Social Connections: Unknown (10/14/2024)    Social Connection and Isolation Panel [NHANES]     Frequency of Social Gatherings with Friends and Family: More than three times a week          Disposition Plan     Medically Ready for Discharge: Anticipated in 2-4 Days        Elizabeth Sneed MD  Hospitalist Service, GOLD TEAM 19  [facility]  Securely message with [employer] (more info)  Text page via [facility] Paging/Directory   See signed in provider for up to date coverage information  ______________________________________________________________________    Interval History   Chart reviewed and patient was examined.    No acute issues. Patient continues to need high flow Oxygen. No fevers or chills  Tolerating diet     Physical Exam   Vital Signs: Temp: 97.7  F (36.5  C) Temp src: Oral BP: (!) 152/78 Pulse: 95   Resp: 20 SpO2: 97 % O2 Device: High Flow Nasal Cannula (HFNC) Oxygen Delivery: 45 LPM  Weight: 160 lbs 11.45 oz    General Appearance: Sitting in bed, on high flow nasal cannula.  HEENT: PERRL: EOMI; moist mucous membrane w/o lesions  Neck: No JVD  Pulmonary:  significant upper airway sounds.  CVS: Regular rhythm, no murmurs, rubs or gallops  GI: BS (+), soft nontender, no rebound or guarding   Extremities: No LE edema   Skin: Diffuse ecchymosis bilateral upper and lower extremity.  Neurologic: A&O x3      Medical Decision Making       35 MINUTES SPENT BY ME on the date of service doing chart review, history, exam, documentation & further activities per the note.    Discussed with bedside RN, care management and daughter ( via phone)  Data   ------------------------- PAST 24 HR DATA REVIEWED -----------------------------------------------        Imaging results reviewed over the past 24 hrs:   No results found for this or any previous visit (from the past 24 hours).

## 2025-03-10 NOTE — PLAN OF CARE
OT order received and chart reviewed.  Per chart, patient is discharging to LTC on Hospice 3/11 or 3/12.  Acute IP OT evaluation not indicated.  Will complete orders.

## 2025-03-10 NOTE — PLAN OF CARE
Goal Outcome Evaluation:    [id number]    Aox4. On HFNC FiO2 54% 45LPM, satting >98%. Mapilex on both cheeks for protection. PIV (Extended Dwell) in the Right upper arm, intact and patent. Mir in place, patent. Pain managed with oral Ativan and Robaxin. Refused Lidocaine patch. Handoff given to next RN.

## 2025-03-10 NOTE — PLAN OF CARE
Problem: Adult Inpatient Plan of Care  Goal: Optimal Comfort and Wellbeing  Outcome: Progressing  Intervention: Monitor Pain and Promote Comfort  Recent Flowsheet Documentation  Taken 3/8/2025 1611 by Hermelinda Ly RN  Pain Management Interventions: medication (see MAR)  Taken 3/8/2025 0802 by Hermelinda Ly RN  Pain Management Interventions: medication (see MAR)  Intervention: Provide Person-Centered Care  Recent Flowsheet Documentation  Taken 3/8/2025 0830 by Hermelinda Ly RN  Trust Relationship/Rapport:   care explained   choices provided  Goal: Readiness for Transition of Care  Outcome: Progressing  Flowsheets (Taken 3/8/2025 1843)  Anticipated Changes Related to Illness: none  Transportation Anticipated: agency  Concerns to be Addressed: discharge planning  Intervention: Mutually Develop Transition Plan  Recent Flowsheet Documentation  Taken 3/8/2025 1843 by Hermelinda Ly RN  Anticipated Changes Related to Illness: none  Transportation Anticipated: agency  Concerns to be Addressed: discharge planning     Problem: Comorbidity Management  Goal: Maintenance of COPD Symptom Control  Outcome: Progressing  Intervention: Maintain COPD Symptom Control  Recent Flowsheet Documentation  Taken 3/8/2025 0830 by Hermelinda Ly RN  Medication Review/Management: medications reviewed     Problem: Gas Exchange Impaired  Goal: Optimal Gas Exchange  Outcome: Progressing  Intervention: Optimize Oxygenation and Ventilation  Recent Flowsheet Documentation  Taken 3/8/2025 0830 by Hermelinda Ly RN  Head of Bed (HOB) Positioning: HOB at 60-90 degrees     Problem: Delirium  Goal: Optimal Coping  Outcome: Progressing  Intervention: Optimize Psychosocial Adjustment to Delirium  Recent Flowsheet Documentation  Taken 3/8/2025 0830 by Hermelinda Ly RN  Family/Support System Care: self-care encouraged  Goal: Improved Behavioral Control  Outcome: Progressing  Intervention: Prevent and Manage  "Agitation  Recent Flowsheet Documentation  Taken 3/8/2025 0830 by Hermelinda Ly RN  Environment Familiarity/Consistency: daily routine followed  Intervention: Minimize Safety Risk  Recent Flowsheet Documentation  Taken 3/8/2025 0830 by Hermelinda Ly RN  Enhanced Safety Measures:   pain management   review medications for side effects with activity  Trust Relationship/Rapport:   care explained   choices provided  Goal: Improved Attention and Thought Clarity  Outcome: Progressing  Goal: Improved Sleep  Outcome: Progressing   Goal Outcome Evaluation:         VS: BP (!) 142/81 (BP Location: Left arm)   Pulse 95   Temp 99.2  F (37.3  C) (Oral)   Resp 17   Ht 1.778 m (5' 10\")   Wt 72.9 kg (160 lb 11.5 oz)   SpO2 97%   BMI 23.06 kg/m         O2: Sating >90% on HFNC  53FIo2 45L    Output: Mir in place    Last BM: LBM 3/8   Activity: Up with SBA    Skin: Reference flowsheet     Pain: Pain was managed with scheduled Oral morphine and PRN Robaxin,Ativan.      CMS: A&Ox4.   Dressing: N/A   Diet: Regular.diet, spot check BG during shift    LDA: PIV to R SL    Equipment: IV pole, FWW, gait belt, and personal belongings. Call light within reach and uses appropriately.   Plan:  TBD possible hospices or palliative care    Additional Info: No acute changes this shift. Ativan given  and appeared to be effective. Pt. Requested that his 02 sat be 97% at all times.Pt. would also like Chest Physiotherapy but with less intensity per request of pt. And daughter. On coming Nurse informed.  Continue POC.                        "

## 2025-03-11 PROCEDURE — 94640 AIRWAY INHALATION TREATMENT: CPT

## 2025-03-11 PROCEDURE — 250N000013 HC RX MED GY IP 250 OP 250 PS 637: Performed by: PHYSICIAN ASSISTANT

## 2025-03-11 PROCEDURE — 250N000009 HC RX 250: Performed by: INTERNAL MEDICINE

## 2025-03-11 PROCEDURE — 999N000157 HC STATISTIC RCP TIME EA 10 MIN

## 2025-03-11 PROCEDURE — 94640 AIRWAY INHALATION TREATMENT: CPT | Mod: 76

## 2025-03-11 PROCEDURE — 250N000012 HC RX MED GY IP 250 OP 636 PS 637: Performed by: INTERNAL MEDICINE

## 2025-03-11 PROCEDURE — 94660 CPAP INITIATION&MGMT: CPT

## 2025-03-11 PROCEDURE — 250N000013 HC RX MED GY IP 250 OP 250 PS 637

## 2025-03-11 PROCEDURE — 99232 SBSQ HOSP IP/OBS MODERATE 35: CPT

## 2025-03-11 PROCEDURE — 120N000002 HC R&B MED SURG/OB UMMC

## 2025-03-11 PROCEDURE — 94799 UNLISTED PULMONARY SVC/PX: CPT

## 2025-03-11 PROCEDURE — 250N000009 HC RX 250: Performed by: STUDENT IN AN ORGANIZED HEALTH CARE EDUCATION/TRAINING PROGRAM

## 2025-03-11 PROCEDURE — 99232 SBSQ HOSP IP/OBS MODERATE 35: CPT | Performed by: INTERNAL MEDICINE

## 2025-03-11 PROCEDURE — 250N000013 HC RX MED GY IP 250 OP 250 PS 637: Performed by: INTERNAL MEDICINE

## 2025-03-11 RX ORDER — ACETYLCYSTEINE 200 MG/ML
2 SOLUTION ORAL; RESPIRATORY (INHALATION)
Status: DISCONTINUED | OUTPATIENT
Start: 2025-03-11 | End: 2025-03-12 | Stop reason: HOSPADM

## 2025-03-11 RX ORDER — IPRATROPIUM BROMIDE AND ALBUTEROL SULFATE 2.5; .5 MG/3ML; MG/3ML
3 SOLUTION RESPIRATORY (INHALATION)
Status: DISCONTINUED | OUTPATIENT
Start: 2025-03-11 | End: 2025-03-12 | Stop reason: HOSPADM

## 2025-03-11 RX ADMIN — IPRATROPIUM BROMIDE AND ALBUTEROL SULFATE 3 ML: .5; 3 SOLUTION RESPIRATORY (INHALATION) at 19:52

## 2025-03-11 RX ADMIN — ACETYLCYSTEINE 2 ML: 200 SOLUTION ORAL; RESPIRATORY (INHALATION) at 08:23

## 2025-03-11 RX ADMIN — METHOCARBAMOL 750 MG: 750 TABLET ORAL at 14:04

## 2025-03-11 RX ADMIN — PANTOPRAZOLE SODIUM 40 MG: 40 TABLET, DELAYED RELEASE ORAL at 15:59

## 2025-03-11 RX ADMIN — GABAPENTIN 400 MG: 300 CAPSULE ORAL at 08:12

## 2025-03-11 RX ADMIN — GUAIFENESIN 600 MG: 600 TABLET, EXTENDED RELEASE ORAL at 19:29

## 2025-03-11 RX ADMIN — ACETYLCYSTEINE 2 ML: 200 SOLUTION ORAL; RESPIRATORY (INHALATION) at 16:36

## 2025-03-11 RX ADMIN — GABAPENTIN 400 MG: 300 CAPSULE ORAL at 14:03

## 2025-03-11 RX ADMIN — METHOCARBAMOL 750 MG: 750 TABLET ORAL at 04:54

## 2025-03-11 RX ADMIN — IPRATROPIUM BROMIDE AND ALBUTEROL SULFATE 3 ML: .5; 3 SOLUTION RESPIRATORY (INHALATION) at 08:36

## 2025-03-11 RX ADMIN — AMLODIPINE BESYLATE 5 MG: 5 TABLET ORAL at 22:40

## 2025-03-11 RX ADMIN — AZITHROMYCIN DIHYDRATE 500 MG: 250 TABLET ORAL at 08:12

## 2025-03-11 RX ADMIN — ROSUVASTATIN 10 MG: 10 TABLET, FILM COATED ORAL at 08:12

## 2025-03-11 RX ADMIN — MORPHINE SULFATE 10 MG: 20 SOLUTION ORAL at 04:54

## 2025-03-11 RX ADMIN — PANTOPRAZOLE SODIUM 40 MG: 40 TABLET, DELAYED RELEASE ORAL at 08:12

## 2025-03-11 RX ADMIN — PREDNISONE 30 MG: 20 TABLET ORAL at 08:12

## 2025-03-11 RX ADMIN — ASPIRIN 81 MG: 81 TABLET ORAL at 08:12

## 2025-03-11 RX ADMIN — GABAPENTIN 400 MG: 300 CAPSULE ORAL at 19:29

## 2025-03-11 RX ADMIN — MORPHINE SULFATE 10 MG: 20 SOLUTION ORAL at 00:56

## 2025-03-11 RX ADMIN — ACETYLCYSTEINE 2 ML: 200 SOLUTION ORAL; RESPIRATORY (INHALATION) at 12:11

## 2025-03-11 RX ADMIN — ACETYLCYSTEINE 2 ML: 200 SOLUTION ORAL; RESPIRATORY (INHALATION) at 19:53

## 2025-03-11 RX ADMIN — ATOVAQUONE 1500 MG: 750 SUSPENSION ORAL at 08:18

## 2025-03-11 RX ADMIN — IPRATROPIUM BROMIDE AND ALBUTEROL SULFATE 3 ML: .5; 3 SOLUTION RESPIRATORY (INHALATION) at 12:11

## 2025-03-11 RX ADMIN — GUAIFENESIN 600 MG: 600 TABLET, EXTENDED RELEASE ORAL at 08:13

## 2025-03-11 RX ADMIN — IPRATROPIUM BROMIDE AND ALBUTEROL SULFATE 3 ML: .5; 3 SOLUTION RESPIRATORY (INHALATION) at 16:36

## 2025-03-11 RX ADMIN — Medication 25 MCG: at 08:13

## 2025-03-11 RX ADMIN — LEVOTHYROXINE SODIUM 125 MCG: 125 TABLET ORAL at 08:13

## 2025-03-11 ASSESSMENT — ACTIVITIES OF DAILY LIVING (ADL)
ADLS_ACUITY_SCORE: 63

## 2025-03-11 NOTE — PROGRESS NOTES
PALLIATIVE CARE PROGRESS NOTE  [facility]     Patient Name: Harrison Thomas  Date of Admission: 2/28/2025   Today the patient was seen for: ongoing goals of care, symptoms      Recommendations & Counseling       GOALS OF CARE:   Life-prolonging with limits  (DNR/DNI).  Plan is to discharge to [facility] facility on 3/11/25 at 12 PM [facility]  [facility] with [facility] referrals.     ADVANCE CARE PLANNING:  Patient has an advance directive dated 2/18/25.  Primary Health Care Agent Janey Thomas (Daughter).  Alternate(s) na.   There is a POLST form on file, this was reviewed and current.  Code status: No CPR- Do NOT Intubate     MEDICAL MANAGEMENT:   #dyspnea  Patient with debilitating dyspnea, end-stage COPD.  Transitioned to butrans patch on 3/6. Harrison finds butrans patch helpful. Stopped scheduled morphine today, continue PRN morphine with decreased frequency q4hrs as needed for dyspnea, air hunger.     Opioid use in the last 24 hours: Total OME (Morphine 40 mg) + Butrans patch 5 mcg/hr.   Morphine (Roxanol) 7.5 mg p.o. 3 times daily (stopped on 3/10)  Continue morphine (Roxanol) 5-10 mg q4hrs prn (frequency decreased 3/10)  Continue Butrans patch 5 mcg/hr started on 03/06.   Could consider increasing Butrans patch 10 mcg/hr given increased need for prn morphine. Patient declined today 3/11. He states prn morphine seems to be helpful with dyspnea    Lorazepam 0.25 mg q6hr prn   Acetaminophen (Tylenol) 650 mg every 4 hours as needed for mild pain  Continue Mucinex 600 oral twice daily  Continue DuoNebs 4 times daily and q4hr prn  Gabapentin 400 mg oral 3 times daily  Antibiotic management, steroid, and diuresis as per primary  Fan at bedside   Anticipated discharge with hospice on 3/11     #At risk for opioid-induced constipation   Last documented BM 3/8  Continue MiraLAX daily as needed  Continue Senokot 1-2 tab twice daily as  needed    Palliative Care will continue to follow. Thank you for the consult and allowing us to aid in the care of Harrison Thomas.    These recommendations have been discussed with primary team, bedside nurse.    PREETI Soares CNP  [facility], Palliative Care  Securely message with the [employer] Web Console (learn more here) or  Text page via [facility] Paging/Directory        Assessment          Harrison Thomas is a 79 year old male admitted on 2/28/2025 history of thyroid cancer with lung metastases, Parkinson's disease, neuropathy, glaucoma, osteoporosis, chronic pain, severe COPD, hyperlipidemia, obstructive sleep apnea and coronary artery disease and is admitted for acute on chronic hypoxic and hypercapnic respiratory failure with increased O2 use requiring HFNC and c/f CAP as potential underlying etiology. Pt admitted for further mgmt and work up.      Mr. Thomas is well known to our service and was last seen by my colleague Dr. Wilhelm 2/6/25. Our team is consulted again for symptom management and for ongoing goals of care discussion. Previously recommended hospice care given his end-stage COPD and thyroid cancer with lung metastases.      Today, the patient was seen for:  #Acute on chronic hypoxic respiratory failure without hypercarbia, COPD exacerbation, likely underlying CAP  #History of severe COPD  #Thyroid cancer with lung metastases  #Palliative care encounter  #Goals of care discussion  #Patient and family support      Interval History:     Multidisciplinary collaboration:  Chart reviewed, no major events overnight. Spoke with Dr. Johnson and Zandra ABRAHAM this afternoon. Anticipated discharge to LTC with [facility] on 3/11/25 at 12 PM.     Notable medications:  Gabapentin 400 mg TID  Guaifenesin 600 BID  Duoneb q4h  Morphine 5-10 mg q 4hrs prn   Protonix 40 gm daily  Miralax BID  Prednisone 30 mg daily  Duoneb PRN   Lorazepam 0.25 mg q6h PRN    Patient/family narrative  Harrison was seen resting  comfortable with eyes closed in bed. He arouses easily to voice. Denies any pain or discomfort. He is breathing more comfortably today. He reports current regimen with Butrans patch and prn morphine is helping with his breathing. He has been weaned to 4L oxygen via NC and is breathing comfortably today.      Review of Systems:     Besides above, ROS was reviewed and is unremarkable        Physical Exam:   Temp:  [97.3  F (36.3  C)-97.9  F (36.6  C)] 97.8  F (36.6  C)  Pulse:  [77] 77  Resp:  [16-18] 16  BP: (126-156)/(60-77) 156/77  SpO2:  [97 %] 97 %  160 lbs 11.45 oz  Constitutional: Alert, awake, lying in bed, no acute distress   Respiratory: Non-labored breathing, on oxygen via NC   Abdomen: Soft nontender, no distention,+BS  NEURO: Alert and oriented x 3  Psychiatric: Calm   SKIN: Warm/dry, scattered bruises bilateral upper extremity  JOINT/EXTREMITIES: Limited exam. Spontaneous movement of bilateral upper extremity noted      Data Reviewed:     CMP  Recent Labs   Lab 03/09/25  2146 03/09/25  1242 03/07/25  1213 03/07/25  0610 03/06/25  1817 03/06/25  0803   NA  --   --   --  143  --  145   POTASSIUM  --   --   --  4.6  --  4.0   CHLORIDE  --   --   --  102  --  106   CO2  --   --   --  28  --  29   ANIONGAP  --   --   --  13  --  10   * 113*   < > 70   < > 72   BUN  --   --   --  19.3  --  18.3   CR  --   --   --  0.78  --  0.69   GFRESTIMATED  --   --   --  >90  --  >90   KARLIE  --   --   --  9.5  --  9.1    < > = values in this interval not displayed.     CBC  Recent Labs   Lab 03/07/25  0610 03/05/25  0637   WBC 12.0* 7.6   RBC 4.14* 3.92*   HGB 13.1* 12.1*   HCT 40.0 38.6*   MCV 97 99   MCH 31.6 30.9   MCHC 32.8 31.3*   RDW 15.6* 15.9*   * 89*       No results found for this or any previous visit (from the past 24 hours).    Medical Decision Making       45 MINUTES SPENT BY ME on the date of service doing chart review, history, exam, documentation & further activities per the note.

## 2025-03-11 NOTE — PLAN OF CARE
"Goal Outcome Evaluation:      Vitally stable, BP (!) 143/67 (BP Location: Left arm)   Pulse 84   Temp 97.7  F (36.5  C) (Axillary)   Resp 16   Ht 1.778 m (5' 10\")   Wt 72.9 kg (160 lb 11.5 oz)   SpO2 100%   BMI 23.06 kg/m      Alert and oriented X4.    Catheter in place.    Not out of bed.     Pain managed with oral morphine and robaxin.     Right upper arm extended dwell PIV saline locked.    On 4 L of oxygen.    Patient was having some crackles at around 0600. Writer called respiratory therapist for neb treatment. Patient refused. Provider informed.    Regular diet.                       "

## 2025-03-11 NOTE — PROGRESS NOTES
[facility]    Medicine Progress Note - Hospitalist Service, GOLD TEAM 20    Date of Admission:  2/28/2025    Assessment & Plan   Harrison Thomas is a 79 year old male admitted on 2/28/2025. He has a history of thyroid cancer with lung metastases, Parkinson's disease, neuropathy, glaucoma, osteoporosis, chronic pain, severe COPD, hyperlipidemia, obstructive sleep apnea and coronary artery disease and is admitted for acute on chronic hypoxic and hypercapnic respiratory failure with increased O2 use requiring HFNC and c/f CAP as potential underlying etiology. Pt admitted for further mgmt and work up, stable at time of admission.        # Acute on chronic hypoxic respiratory failure without hypercarbia with exacerbation of COPD coupled with underlying community-acquired pneumonia.  # H/o severe COPD   Pt with history of multiple pulmonary comorbidities including lung mets from thyroid cancer, severe COPD, and with known BRENNEN with recent hospitalization in January for sepsis 2/2 CAP c/b COPD exacerbation. Ddx  including CAP given septic picture vs aspiration induced vs PE vs ACS vs HF exacerbation vs viral etiology for AHHRF vs lack of medication adherence vs more sinister including PTX considered.   - Admitted for acute on chronic hypoxic respiratory failure; etiology unclear. Pulmonary team consulting  - Given concern for underlying CAP, started on CTX, but broadened to Zosyn given concern for HAP plus Azithromycin. This was evaluated by pulmonary team as there was a low concern for bacterial  infectious issue. IV antibiotics have now been stopped and resume azithromycin every other day. Respiratory panel did come back as coronavirus, which may be the triggering factor for this admission   - Initiated on IV Solumedrol 62.5 mg q8h, now to taper to 40 mg daily for 5 days per pulm recommendations    - Duoneb QID with RT. Hold home inhalers while on nebs  -Furosemide 20 mg  BID now on hold ( takes 40 mg BID). Unsure why this is needed, as the patient has a relatively normal echo, and IVC does point towards low volume   -pulm toilet: Flutter valve already ordered. .   - Bilateral US: Negative for DVT    Despite the above, patient was in respiratory distress on the evening of 3/6, needing high flow Oxygen, Vol[place] and Mucomyst nebs, now weaned to 4 lts / min   A trial of diuretics was tried with no response   Given severe debilitating COPD, and the effects of opiates (used  for symptom control), one has to achieve the right balance as increased opiates will depress breathing.  Palliative care has assisted with this, and now on scheduled Roxanol 7.5 mg TID with additional 5-10 mg Q 2 hrs PRN. Also has additional lorazepam 0.25 mg Q 6 hrs PRN.  Butrans patch 5 mg started on 3/6  Scheduled morphine TID stopped today (3/10), but patient still has PRN Roxanol 5-10 mg Q 4 hrs PRN     Patient lives in a long term care unit, and may benefit from discussions along the lines of hospice.   Discussed with daughter Janey who agrees with the plan   Palliative care and SW team have discussed this with patient extensively, with plans to transition to hospice in TCU tomorrow         # Elevated lactic acid    likely in the setting of hypoxia. 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 Patient had received radioactive iodine in the past in addition to RT to the lung. 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 TLC noted to be decreased to 74% of predicted at that visit iso his severe COPD and scarring from radiation treatment and volume loss.   -- PTA levothyroxine 112  mcg daily.      # Acute on chronic thrombocytopenia - likely in the setting of acute illness   Unclear etiology. During last hospitalization, thought to be in the setting of infection. Not on heparin, HIT unlikely. PBS with normal platelet morphology 1/29/25, just significantly decreased in number. DDx including medication induced vs immune mediated vs underproduction vs splenic sequestration vs consumption considered. No valorie bleeding and decreased concern for PE on admission making consumptive process unlikely. Hgb stable as well.   -- Given improvement in thrombocytopenia, resume PTA aspirin.  -- Continue to monitor CBC daily for the time being. Platelet count has improved to 101 ( baseline)     # BRENNEN   Refused BiPAP here, unclear if CPAP at home. HFNC overnight, CTM.      # CAD   # HLD   # HTN   # H/o SAH 2023   -- Resume PTA amlodipine 5mg at bedtime   -- Resume PTA rosuvastatin.  -- PT aspirin resumed.  - lasix 20 mg BID on hold     Hypernatremia:  Likely due to dehydration. Hold lasix for now    Sodium at 143 on 3/7       # Parkinsons disease   Has seen neurology and on sinemet. Patient endorses Left side is weaker than the right. On physical exam, mild rigidity in upper extremities, left more than right. Able to bring both knees to chest without pain in bilateral hips. Gross motor and strength intact.   -Continue PTA sinemet TID      # Chronic pain   # Low back pain   # Neuropathy   Continue PTA chronic pain medications including Gabapentin, Morphine sulfate, APAP prn.   Now on butrans patch and PRN Roxanol 5-10 mg Q 4 hrs PRN          Diet: Combination Diet Regular Diet Adult  Snacks/Supplements Adult: Ensure Enlive; Between Meals    DVT Prophylaxis: Pneumatic Compression Devices  Mir Catheter: PRESENT, indication: Acute retention or obstruction  Lines: None     Cardiac Monitoring: None  Code Status: No CPR- Do NOT Intubate      Clinically Significant Risk Factors               # Hypoalbuminemia: Lowest  albumin = 3.4 g/dL at 3/1/2025  6:21 AM, will monitor as appropriate     # Hypertension: Noted on problem list                # Financial/Environmental Concerns: none         Social Drivers of Health    Tobacco Use: Medium Risk (2/28/2025)    Patient History     Smoking Tobacco Use: Former     Smokeless Tobacco Use: Never   Physical Activity: Inactive (10/14/2024)    Exercise Vital Sign     Days of Exercise per Week: 0 days     Minutes of Exercise per Session: 0 min   Stress: Stress Concern Present (10/14/2024)    [employer] - Occupational Stress Questionnaire     Feeling of Stress : Very much   Social Connections: Unknown (10/14/2024)    Social Connection and Isolation Panel [NHANES]     Frequency of Social Gatherings with Friends and Family: More than three times a week          Disposition Plan     Medically Ready for Discharge: Anticipated in 2-4 Days        Elizabeth Sneed MD  Hospitalist Service, GOLD TEAM 20  [facility]  Securely message with [employer]ELOS (more info)  Text page via [employer] Paging/Directory   See signed in provider for up to date coverage information  ______________________________________________________________________    Interval History   Chart reviewed and patient was examined.    No acute issues.   Patinet has been weaned to 4 lts O2, tolerating well  Resting comfortably     Physical Exam   Vital Signs: Temp: 97.7  F (36.5  C) Temp src: Axillary BP: (!) 143/67 Pulse: 77   Resp: 16 SpO2: 97 % O2 Device: Nasal cannula Oxygen Delivery: 4 LPM  Weight: 160 lbs 11.45 oz    General Appearance: Sitting in bed, on high flow nasal cannula.  HEENT: PERRL: EOMI; moist mucous membrane w/o lesions  Neck: No JVD  Pulmonary: significant upper airway sounds.  CVS: Regular rhythm, no murmurs, rubs or gallops  GI: BS (+), soft nontender, no rebound or guarding   Extremities: No LE edema   Skin: Diffuse ecchymosis bilateral  upper and lower extremity.  Neurologic: A&O x3      Medical Decision Making       35 MINUTES SPENT BY ME on the date of service doing chart review, history, exam, documentation & further activities per the note.    Discussed with bedside RN, care management and daughter ( via phone)  Data   ------------------------- PAST 24 HR DATA REVIEWED -----------------------------------------------        Imaging results reviewed over the past 24 hrs:   No results found for this or any previous visit (from the past 24 hours).

## 2025-03-11 NOTE — PROGRESS NOTES
Care Management Follow Up    Length of Stay (days): 11    Expected Discharge Date: 03/11/2025     Concerns to be Addressed: discharge planning     Patient plan of care discussed at interdisciplinary rounds: Yes    Anticipated Discharge Disposition: Long Term Care    [facility]   [address] [place] [address]  P: [phone]   Adm: [phone]  F: [phone]      [facility]  Ph: [phone]            Anticipated Discharge Services: None  Anticipated Discharge DME: None    Patient/family educated on Medicare website which has current facility and service quality ratings: no  Education Provided on the Discharge Plan: Yes  Patient/Family in Agreement with the Plan: yes    Referrals Placed by CM/SW:    Private pay costs discussed: transportation costs    Discussed  Partnership in Safe Discharge Planning  document with patient/family: No     Handoff Completed: No, handoff not indicated or clinically appropriate    Additional Information:  Per nursing notes, pt is now on 4L of oxygen. JARAD called and changed pts transport to wheelchair ride, new ride time pickup between 12:06-12:51 PM on 3/12. JARAD messaged [facility] to confirm plan and update on the oxygen. JARAD called [facility] and confirmed plan for tomorrow.     CHW to complete IMM. PAS not needed.     JARAD met with pt at bedside to confirm plan. Pt indicated he would do better with a stretcher ride and is not able to tolerate a wheelchair ride. JARAD called EMS and changed ride to a stretcher. New pickup window between 12:09-12:51 PM.     JARAD called pts daughter and confirmed the plan for tomorrow. JARAD told Janey that the ambulance bill may be submitted to Medicare so she may receive a bill, but advised her to submit it to Medicaid if she does. Janey voiced understanding. Janey had no questions for JARAD at this time.     JARAD completed PCS form.     Next Steps:   -Will need to discharge with 3 days of new meds  -Send orders  to [facility]      KIARA Piña   [facility]  Available via [employer]  Covering 5MS SW  Ph: [phone]

## 2025-03-11 NOTE — PLAN OF CARE
Goal Outcome Evaluation:           Overall Patient Progress: no changeOverall Patient Progress: no change    Outcome Evaluation: 2012-6023 Pt had no acute events during shift, no significant changes.    Pt is AOx4. Pt denies chest pain, new numbness or tingling, nausea or vomiting. Pt endorses lower back pain 7/10 even pst PRN medications. Pt endorses SOB, on 4 Lpm via NC. Pt is able to make needs known and calls appropriately.

## 2025-03-11 NOTE — PLAN OF CARE
Aox4. See eMar for medication management. Discussed updates with Palliative.   RUE extend dwell PIV SL. Droplet precautions maintained.   4L supplemental O2. Crackles in lungs audible without auscultation this am but improved throughout the day. Wheezes and SOB on exertion. SBA pivot to commode. Mir patent, in place. LBM today. Skin intact, mepilex for protection on old scratch on R FA. BL upper extremeties ecchymotic. BL lower extremeties flaky/dry, lotion applied to extremeties.  Regular diet, ate 1 meal today. Orders independently. Likes ice chips.     Care plan updated. Plan is for patient to discharge tomorrow. Handoff given to Larry JULIEN RN.

## 2025-03-11 NOTE — PROVIDER NOTIFICATION
03/11/25 1700   RCAT Assessment   Reason for Assessment COPD   Pulmonary Status 4   Surgical Status 0   Chest X-ray 2   Respiratory Pattern 2   Mental Status 0   Breath Sounds 3   Cough Effectiveness 1   Level of Activity 1   O2 Required for SpO2>=92% 2   Acuity Level (points) 15   Acuity Level  3   Re-eval Interval Guideline Every 3 days   Re-evaluation Date 03/13/25     Will change treatment to QID per home regimen

## 2025-03-12 VITALS
SYSTOLIC BLOOD PRESSURE: 115 MMHG | DIASTOLIC BLOOD PRESSURE: 62 MMHG | TEMPERATURE: 98.1 F | OXYGEN SATURATION: 99 % | HEIGHT: 70 IN | HEART RATE: 91 BPM | BODY MASS INDEX: 23.01 KG/M2 | RESPIRATION RATE: 18 BRPM | WEIGHT: 160.72 LBS

## 2025-03-12 PROCEDURE — 94640 AIRWAY INHALATION TREATMENT: CPT

## 2025-03-12 PROCEDURE — 99207 PR NO BILLABLE SERVICE THIS VISIT: CPT | Performed by: INTERNAL MEDICINE

## 2025-03-12 PROCEDURE — 250N000013 HC RX MED GY IP 250 OP 250 PS 637: Performed by: INTERNAL MEDICINE

## 2025-03-12 PROCEDURE — 250N000012 HC RX MED GY IP 250 OP 636 PS 637: Performed by: INTERNAL MEDICINE

## 2025-03-12 PROCEDURE — 250N000013 HC RX MED GY IP 250 OP 250 PS 637

## 2025-03-12 PROCEDURE — 999N000157 HC STATISTIC RCP TIME EA 10 MIN

## 2025-03-12 PROCEDURE — 99239 HOSP IP/OBS DSCHRG MGMT >30: CPT | Performed by: INTERNAL MEDICINE

## 2025-03-12 PROCEDURE — 250N000009 HC RX 250: Performed by: STUDENT IN AN ORGANIZED HEALTH CARE EDUCATION/TRAINING PROGRAM

## 2025-03-12 RX ORDER — BUPRENORPHINE 5 UG/H
1 PATCH TRANSDERMAL WEEKLY
Qty: 4 PATCH | Refills: 0 | Status: SHIPPED | OUTPATIENT
Start: 2025-03-13

## 2025-03-12 RX ORDER — IPRATROPIUM BROMIDE AND ALBUTEROL SULFATE 2.5; .5 MG/3ML; MG/3ML
3 SOLUTION RESPIRATORY (INHALATION) EVERY 4 HOURS PRN
DISCHARGE
Start: 2025-03-12

## 2025-03-12 RX ORDER — METHOCARBAMOL 750 MG/1
750 TABLET, FILM COATED ORAL 4 TIMES DAILY PRN
Qty: 30 TABLET | DISCHARGE
Start: 2025-03-12

## 2025-03-12 RX ORDER — ACETYLCYSTEINE 200 MG/ML
2 SOLUTION ORAL; RESPIRATORY (INHALATION) 4 TIMES DAILY
DISCHARGE
Start: 2025-03-12

## 2025-03-12 RX ORDER — FUROSEMIDE 20 MG/1
20 TABLET ORAL DAILY
DISCHARGE
Start: 2025-03-12

## 2025-03-12 RX ORDER — PREDNISONE 1 MG/1
TABLET ORAL
DISCHARGE
Start: 2025-03-12 | End: 2025-04-10

## 2025-03-12 RX ORDER — LORAZEPAM 0.5 MG/1
0.25 TABLET ORAL EVERY 6 HOURS PRN
Qty: 10 TABLET | Refills: 0 | Status: SHIPPED | OUTPATIENT
Start: 2025-03-12

## 2025-03-12 RX ORDER — MORPHINE SULFATE 20 MG/ML
5-10 SOLUTION ORAL EVERY 4 HOURS PRN
Qty: 30 ML | Refills: 0 | Status: SHIPPED | OUTPATIENT
Start: 2025-03-12

## 2025-03-12 RX ORDER — CARBIDOPA AND LEVODOPA 25; 100 MG/1; MG/1
1 TABLET ORAL 3 TIMES DAILY
Status: DISCONTINUED | OUTPATIENT
Start: 2025-03-12 | End: 2025-03-12 | Stop reason: HOSPADM

## 2025-03-12 RX ORDER — CARBIDOPA AND LEVODOPA 25; 100 MG/1; MG/1
1.5 TABLET ORAL 3 TIMES DAILY
DISCHARGE
Start: 2025-03-12

## 2025-03-12 RX ORDER — POTASSIUM CHLORIDE 1500 MG/1
20 TABLET, EXTENDED RELEASE ORAL DAILY
DISCHARGE
Start: 2025-03-12

## 2025-03-12 RX ORDER — ATOVAQUONE 750 MG/5ML
1500 SUSPENSION ORAL DAILY
DISCHARGE
Start: 2025-03-12

## 2025-03-12 RX ADMIN — IPRATROPIUM BROMIDE AND ALBUTEROL SULFATE 3 ML: .5; 3 SOLUTION RESPIRATORY (INHALATION) at 13:31

## 2025-03-12 RX ADMIN — CARBIDOPA AND LEVODOPA 1 TABLET: 25; 100 TABLET ORAL at 14:05

## 2025-03-12 RX ADMIN — PREDNISONE 30 MG: 20 TABLET ORAL at 08:37

## 2025-03-12 RX ADMIN — LEVOTHYROXINE SODIUM 125 MCG: 125 TABLET ORAL at 08:37

## 2025-03-12 RX ADMIN — ACETYLCYSTEINE 2 ML: 200 SOLUTION ORAL; RESPIRATORY (INHALATION) at 13:31

## 2025-03-12 RX ADMIN — ROSUVASTATIN 10 MG: 10 TABLET, FILM COATED ORAL at 08:37

## 2025-03-12 RX ADMIN — PANTOPRAZOLE SODIUM 40 MG: 40 TABLET, DELAYED RELEASE ORAL at 08:37

## 2025-03-12 RX ADMIN — ATOVAQUONE 1500 MG: 750 SUSPENSION ORAL at 08:36

## 2025-03-12 RX ADMIN — GABAPENTIN 400 MG: 300 CAPSULE ORAL at 08:37

## 2025-03-12 RX ADMIN — ASPIRIN 81 MG: 81 TABLET ORAL at 08:37

## 2025-03-12 RX ADMIN — Medication 0.25 MG: at 00:53

## 2025-03-12 RX ADMIN — GUAIFENESIN 600 MG: 600 TABLET, EXTENDED RELEASE ORAL at 08:37

## 2025-03-12 RX ADMIN — GABAPENTIN 400 MG: 300 CAPSULE ORAL at 14:03

## 2025-03-12 RX ADMIN — MORPHINE SULFATE 10 MG: 20 SOLUTION ORAL at 05:20

## 2025-03-12 RX ADMIN — CARBIDOPA AND LEVODOPA 1 TABLET: 25; 100 TABLET ORAL at 08:41

## 2025-03-12 RX ADMIN — Medication 25 MCG: at 08:37

## 2025-03-12 RX ADMIN — METHOCARBAMOL 750 MG: 750 TABLET ORAL at 05:20

## 2025-03-12 ASSESSMENT — ACTIVITIES OF DAILY LIVING (ADL)
ADLS_ACUITY_SCORE: 63

## 2025-03-12 NOTE — PLAN OF CARE
Goal Outcome Evaluation:      Plan of Care Reviewed With: patient    Overall Patient Progress: no changeOverall Patient Progress: no change     End of shift Summary: See flowsheet for VS and detail assessments.    Neuro/CMS: A&Ox4, some forgetfulness, n/t in BLE baseline    VS: Hx HTN, denies chest pain    Pulmonary: Coarse crackles, diminished lung sounds. Frequent, productive cough.     Activity: Assist of 1    Skin: Bruising on BUE, protective mepilex on R arm    GI/: LBM 3/11/25, martinez in place, see flowsheets for output     Diet: Regular    Pain: Pain 6/10, pt states pain is in his back    Dressings/Drains:      IV: R upper arm PIV, SL    Additional info: Pt calm and pleasant. Needs reminders to order meals. Refuses to have room door closed, writer educated pt about droplet precautions but pt still refused.      Continue POC, report given to oncoming RN.

## 2025-03-12 NOTE — PROGRESS NOTES
Care Management Discharge Note    Discharge Date: 03/12/2025       Discharge Disposition: Long Term Care    [facility]   [address] [place] [address]  P: [phone]   Adm: [phone]  F: [phone]      [facility]  Ph: [phone]       Discharge Services: None    Discharge DME: None    Discharge Transportation: agency    Private pay costs discussed: transportation costs    Does the patient's insurance plan have a 3 day qualifying hospital stay waiver?  No    PAS Confirmation Code:  not needed, returning to facility   Patient/family educated on Medicare website which has current facility and service quality ratings: no    Education Provided on the Discharge Plan: Yes  Persons Notified of Discharge Plans: pt, provider, pts daughter, bedside RN, [facility] transport  Patient/Family in Agreement with the Plan: yes    Handoff Referral Completed: No, handoff not indicated or clinically appropriate    Additional Information:  Provider completed orders and discharge summary. JARAD sent orders via [employer] to [facility] and [facility]. [facility] confirmed no hard scripts are needed, as pt is coming with 3 days of comfort meds. Bedside RN added to conversation regarding pts discharge today. JARAD confirmed plan with facility, hospice, pt, pts daughter, and provider yesterday. JARAD discussed potential tranpsortation costs with pts daughter and encouraged her to submit bill to Medicare if she receives one. AFIAW completed IMM.      KIARA Piña   [facility]  Available via [employer]  Covering 5MS   Ph: [phone]

## 2025-03-12 NOTE — PLAN OF CARE
Goal Outcome Evaluation:    Alert,orientated x4.  Maintaining on 4lpm/NC with satts above 90s.  Ativan given for sleep/SOB,morphine and robaxin for back pain.  Voids well via martinez.  Is passing gas.LBM yesterday.  Extended dwell on R upper arm,saline locked.  Plan:discharge to LTC on hospice( [facility]) with ride set at 12pm-12:51pm.See CM note.

## 2025-03-12 NOTE — DISCHARGE SUMMARY
[facility]  Hospitalist Discharge Summary      Date of Admission:  2/28/2025  Date of Discharge:  3/12/2025  Discharging Provider: Elizabeth Sneed MD  Discharge Service: Hospitalist Service, GOLD TEAM 20    Discharge Diagnoses   # Acute on chronic hypoxic respiratory failure without hypercarbia with exacerbation of COPD   #Community-acquired pneumonia considered but ruled out; Acute on Chronic Aspiration Pneumonia and Non-COVID Coronavirus only   # H/o severe COPD   # Elevated lactic acid   # Thyroid cancer with lung metastases   # Lung mass s/p RT with possible RT pneumonitis   # Acute on chronic thrombocytopenia - likely in the setting of acute illness   #BRENNEN  # CAD   # HLD   # HTN   # H/o SAH 2023  #Hypernatremia  # Parkinsons disease  # Chronic pain   # Low back pain   # Neuropathy            Clinically Significant Risk Factors          Follow-ups Needed After Discharge   Follow-up Appointments       Follow Up and recommended labs and tests      Patient will transition to Hospice Care on transfer to long term care unit  NH physician/ Hospice team to have oversight   Hospice team to re-review all meds and discontinue those that are not necessary                Discharge Disposition   Discharged to long-term care facility  Condition at discharge: Guarded    Hospital Course   Harrison Thomas is a 79 year old male admitted on 2/28/2025. He has a history of thyroid cancer with lung metastases, Parkinson's disease, neuropathy, glaucoma, osteoporosis, chronic pain, severe COPD, hyperlipidemia, obstructive sleep apnea and coronary artery disease and is admitted for acute on chronic hypoxic and hypercapnic respiratory failure with increased O2 use requiring HFNC and c/f CAP as potential underlying etiology. Pt admitted for further mgmt and work up, stable at time of admission.        # Acute on chronic hypoxic respiratory failure without hypercarbia with  exacerbation of COPD   # H/o severe COPD   #Community-acquired pneumonia considered but ruled out; Acute on Chronic Aspiration Pneumonia and Non-COVID Coronavirus only     Pt with history of multiple pulmonary comorbidities including lung mets from thyroid cancer, severe COPD, and with known BRENNEN with recent hospitalization in January for sepsis 2/2 CAP c/b COPD exacerbation. Ddx  including CAP given septic picture vs aspiration induced vs PE vs ACS vs HF exacerbation vs viral etiology for AHHRF vs lack of medication adherence vs more sinister including PTX considered.   - Admitted for acute on chronic hypoxic respiratory failure; etiology unclear. Pulmonary team consulting  - Given concern for underlying CAP, started on CTX, but broadened to Zosyn given concern for HAP plus Azithromycin. This was evaluated by pulmonary team as there was a low concern for bacterial  infectious issue. IV antibiotics have now been stopped and resume azithromycin every other day.   Respiratory panel did come back as coronavirus, which may be the triggering factor for this admission   - Initiated on IV Solumedrol 62.5 mg q8h, now on a prolonged tapering dose    - Duoneb QID with RT. Hold home inhalers while on nebs  -Furosemide 20 mg BID now on hold  Unsure why this is needed, as the patient has a relatively normal echo, and IVC does point towards low volume. At discharge, will resume at a lower dose of 20 mg daily   - Bilateral US: Negative for DVT    Despite the above, patient was in respiratory distress on the evening of 3/6, needing high flow Oxygen, Volera and Mucomyst nebs, now weaned to 4 lts / min   A trial of diuretics was tried with no response  Given severe debilitating COPD, and the effects of opiates (used  for symptom control), one has to achieve the right balance as increased opiates will depress breathing.  Palliative care has assisted with this, and now on a butrans patch 5 mg/ hr, with room to go up to  10mg/hr  Additional Roxanol 5-10 mg q 4 hrs PRN   Palliative care and SW team have discussed this with patient extensively, with plans to transition to hospice in TCU tomorrow         # Elevated lactic acid    likely in the setting of hypoxia. 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 Patient had received radioactive iodine in the past in addition to RT to the lung. 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 TLC noted to be decreased to 74% of predicted at that visit iso his severe COPD and scarring from radiation treatment and volume loss.   -- PTA levothyroxine 112 mcg daily.      # Acute on chronic thrombocytopenia - likely in the setting of acute illness   Unclear etiology. During last hospitalization, thought to be in the setting of infection. Not on heparin, HIT unlikely. PBS with normal platelet morphology 1/29/25, just significantly decreased in number. DDx including medication induced vs immune mediated vs underproduction vs splenic sequestration vs consumption considered. No valorie bleeding and decreased concern for PE on admission making consumptive process unlikely. Hgb stable as well.   -- Given improvement in thrombocytopenia, resume PTA aspirin.  -- . Platelet count has improved to 101 ( baseline)     # BRENNEN   Refused BiPAP here, unclear if CPAP at home. HFNC overnight, CTM.      # CAD   # HLD   # HTN   # H/o SAH 2023   -- Resume PTA amlodipine 5mg at bedtime   -- Resume PTA rosuvastatin.  -- PT aspirin resumed.  - lasix 20 mg BID on hold, resumed 20 mg at discharge     Hypernatremia:  Likely due to dehydration. Hold lasix for now    Sodium at 143 on 3/7       #  Parkinsons disease   Has seen neurology and on sinemet. Patient endorses Left side is weaker than the right. On physical exam, mild rigidity in upper extremities, left more than right. Able to bring both knees to chest without pain in bilateral hips. Gross motor and strength intact.   -Continue PTA sinemet TID. This was on hold for some time, and resumed at dioscharge      # Chronic pain   # Low back pain   # Neuropathy   Pain regimen with butrans patch and PRN roxanol as above     Consultations This Hospital Stay   PHYSICAL THERAPY ADULT IP CONSULT  OCCUPATIONAL THERAPY ADULT IP CONSULT  NURSING TO CONSULT FOR VASCULAR ACCESS CARE IP CONSULT  CARE MANAGEMENT / SOCIAL WORK IP CONSULT  VASCULAR ACCESS ADULT IP CONSULT  NURSING TO CONSULT FOR VASCULAR ACCESS CARE IP CONSULT  PHARMACY LIAISON FOR MEDICATION COVERAGE CONSULT  RESPIRATORY CARE IP CONSULT  PULMONARY GENERAL ADULT IP CONSULT  PALLIATIVE CARE ADULT IP CONSULT  PHARMACY LIAISON FOR MEDICATION COVERAGE CONSULT  PHYSICAL THERAPY ADULT IP CONSULT  OCCUPATIONAL THERAPY ADULT IP CONSULT    Code Status   No CPR- Do NOT Intubate    Time Spent on this Encounter   IElizabeth MD, personally saw the patient today and spent greater than 30 minutes discharging this patient.       Elizabeth Sneed MD  [facility]  [address] [place] [address]  Phone: [phone]  Fax: [phone]  ______________________________________________________________________    Physical Exam   Vital Signs: Temp: 98.1  F (36.7  C) Temp src: Axillary BP: 115/62 Pulse: 91   Resp: 18 SpO2: 99 % O2 Device: Nasal cannula Oxygen Delivery: 4 LPM  Weight: 160 lbs 11.45 oz  General Appearance: Awake, alert and not in distress  Respiratory: Coarse breath sounds with transmitted upper airway sounds   Cardiovascular: Normal heart sounds. No murmurs   GI: Soft, non tender. Normal bowel sounds   Skin: No bruising or bleeding    Other:Awake, alert and orientated X 3          Primary Care Physician   Heydi Silveira    Discharge Orders      General info for SNF    Length of Stay Estimate: Long Term Care  Condition at Discharge: Declining  Level of care:skilled   Rehabilitation Potential: Poor  Admission H&P remains valid and up-to-date: Yes  Recent Chemotherapy: N/A  Use Nursing Home Standing Orders: Yes     Mantoux instructions    Give two-step Mantoux (PPD) Per Facility Policy Yes     Tubes and Drains    Current Tubes and Drains:     Drain  Duration           Urinary Drain 03/02/25 Urethral Catheter Coude 18 fr 9 days         To straight gravity drainage. Change catheter every 2 weeks and PRN for leaking or decreased urine output with signs of bladder distention. DO NOT change catheter without a specific Provider order IF diagnosis of benign prostatic hypertrophy (BPH), neurogenic bladder, or other urological conditions      Reason for your hospital stay    COPD Exacerbation     Activity - Up with nursing assistance     Follow Up and recommended labs and tests    Patient will transition to Hospice Care on transfer to long term care unit  NH physician/ Hospice team to have oversight   Hospice team to re-review all meds and discontinue those that are not necessary     No CPR- Do NOT Intubate     Physical Therapy Adult Consult    Evaluate and treat as clinically indicated.    Reason:  Deconditioning     Occupational Therapy Adult Consult    Evaluate and treat as clinically indicated.    Reason:  Deconditioning     Oxygen (SNF/TCU) Discharge     Diet    Follow this diet upon discharge: Current Diet:Orders Placed This Encounter      Snacks/Supplements Adult: Ensure Enlive; Between Meals      Combination Diet Regular Diet Adult           Discharge Medications   Current Discharge Medication List        START taking these medications    Details   acetylcysteine (MUCOMYST) 20 % neb solution Take 2 mLs by nebulization 4 times daily.    Associated  Diagnoses: Chronic respiratory failure with hypercapnia (H)      atovaquone (MEPRON) 750 MG/5ML suspension Take 10 mLs (1,500 mg) by mouth daily.    Associated Diagnoses: Chronic respiratory failure with hypercapnia (H)      buprenorphine (BUTRANS) 5 MCG/HR WK patch Place 1 patch over 168 hours onto the skin once a week.  Qty: 4 patch, Refills: 0    Associated Diagnoses: COPD, very severe (H)           CONTINUE these medications which have CHANGED    Details   carbidopa-levodopa (SINEMET)  MG tablet Take 1.5 tablets by mouth 3 times daily. Take 1/2 tablet three times a day for one week, then increase to 1 tablet three times a day for one week, then increase to 1.5 tablets three times a day and stay at that dose.    Associated Diagnoses: Parkinson's disease without dyskinesia or fluctuating manifestations (H)      furosemide (LASIX) 20 MG tablet Take 1 tablet (20 mg) by mouth daily.    Associated Diagnoses: Chronic hypoxemic respiratory failure (H)      !! ipratropium - albuterol 0.5 mg/2.5 mg/3 mL (DUONEB) 0.5-2.5 (3) MG/3ML neb solution Take 1 vial (3 mLs) by nebulization every 4 hours as needed for wheezing.    Associated Diagnoses: COPD, very severe (H)      LORazepam (ATIVAN) 0.5 MG tablet Take 0.5 tablets (0.25 mg) by mouth every 6 hours as needed for anxiety (dyspnea).  Qty: 10 tablet, Refills: 0    Associated Diagnoses: Anxiety      methocarbamol (ROBAXIN) 750 MG tablet Take 1 tablet (750 mg) by mouth 4 times daily as needed for muscle spasms.  Qty: 30 tablet    Associated Diagnoses: Low back pain, unspecified back pain laterality, unspecified chronicity, unspecified whether sciatica present      morphine sulfate (ROXANOL) 20 mg/mL (HIGH CONC) soln Take 0.25-0.5 mLs (5-10 mg) by mouth every 4 hours as needed for severe pain.  Qty: 30 mL, Refills: 0    Associated Diagnoses: Chronic respiratory failure with hypercapnia (H)      potassium chloride santo ER (KLOR-CON M20) 20 MEQ CR tablet Take 1 tablet (20  mEq) by mouth daily.    Associated Diagnoses: Hypokalemia      predniSONE (DELTASONE) 1 MG tablet Take 30 tablets (30 mg) by mouth daily for 1 day, THEN 20 tablets (20 mg) daily for 7 days, THEN 10 tablets (10 mg) daily for 7 days, THEN 5 tablets (5 mg) daily for 7 days, THEN 2 tablets (2 mg) daily for 7 days.    Associated Diagnoses: Chronic respiratory failure with hypercapnia (H)       !! - Potential duplicate medications found. Please discuss with provider.        CONTINUE these medications which have NOT CHANGED    Details   acetaminophen (TYLENOL) 325 MG tablet Take 2 tablets (650 mg) by mouth every 4 hours as needed for fever or mild pain.    Associated Diagnoses: Low back pain, unspecified back pain laterality, unspecified chronicity, unspecified whether sciatica present      amLODIPine (NORVASC) 5 MG tablet Take 1 tablet (5 mg) by mouth at bedtime.  Qty: 90 tablet, Refills: 3    Associated Diagnoses: Benign essential hypertension      aspirin 81 MG EC tablet Take 81 mg by mouth daily.    Associated Diagnoses: Closed fracture of neck of right femur, initial encounter (H)      azithromycin (ZITHROMAX) 500 MG tablet Take 1 tablet (500 mg) by mouth every other day  Qty: 45 tablet, Refills: 3    Associated Diagnoses: COPD, very severe (H)      carboxymethylcellulose PF (REFRESH PLUS) 0.5 % ophthalmic solution Place 1 drop into both eyes every hour as needed for dry eyes    Associated Diagnoses: Chronic obstructive pulmonary disease with acute exacerbation (H)      gabapentin (NEURONTIN) 400 MG capsule Take 1 capsule (400 mg) by mouth 3 times daily  Qty: 270 capsule, Refills: 3    Associated Diagnoses: Chronic obstructive pulmonary disease with acute exacerbation (H)      guaiFENesin (MUCINEX) 600 MG 12 hr tablet Take 1 tablet (600 mg) by mouth 2 times daily.    Associated Diagnoses: COPD exacerbation (H)      !! ipratropium - albuterol 0.5 mg/2.5 mg/3 mL (DUONEB) 0.5-2.5 (3) MG/3ML neb solution Take 1 vial (3  mLs) by nebulization 4 times daily.    Associated Diagnoses: COPD exacerbation (H)      levothyroxine (SYNTHROID/LEVOTHROID) 125 MCG tablet Take 125 mcg by mouth daily.      Lidocaine (LIDOCARE) 4 % Patch Place 1 patch over 12 hours onto the skin every 24 hours. Apply to low back. To prevent lidocaine toxicity, patient should be patch free for 12 hrs daily.    Associated Diagnoses: Low back pain, unspecified back pain laterality, unspecified chronicity, unspecified whether sciatica present      melatonin 5 MG tablet Take 1 tablet (5 mg) by mouth nightly as needed for sleep.    Associated Diagnoses: Insomnia, unspecified type      naloxone (NARCAN) 4 MG/0.1ML nasal spray Spray 1 spray (4 mg) into one nostril alternating nostrils as needed for opioid reversal. every 2-3 minutes until assistance arrives    Associated Diagnoses: COPD exacerbation (H)      Omega-3 Fatty Acids (FISH OIL MAXIMUM STRENGTH) 1200 MG CPDR Take 1 capsule by mouth.      pantoprazole (PROTONIX) 40 MG EC tablet Take 1 tablet (40 mg) by mouth 2 times daily (before meals).    Associated Diagnoses: Esophagitis      polyethylene glycol (MIRALAX) 17 GM/Dose powder Take 17 g by mouth daily.    Associated Diagnoses: Constipation, unspecified constipation type      rosuvastatin (CRESTOR) 10 MG tablet TAKE 1 TABLET BY MOUTH EVERY DAY  Qty: 90 tablet, Refills: 0    Associated Diagnoses: Hyperlipidemia, unspecified      senna-docusate (SENOKOT-S/PERICOLACE) 8.6-50 MG tablet Take 1 tablet by mouth 2 times daily as needed for constipation.    Associated Diagnoses: Constipation, unspecified constipation type      Vitamin D3 (CHOLECALCIFEROL) 25 mcg (1000 units) tablet Take 1 tablet (25 mcg) by mouth daily  Qty: 180 tablet, Refills: 3    Associated Diagnoses: Other emphysema (H)       !! - Potential duplicate medications found. Please discuss with provider.        STOP taking these medications       calcium carbonate (OS-KARLIE) 1500 (600 Ca) MG tablet Comments:  "  Reason for Stopping:         Fluticasone-Umeclidin-Vilanterol (TRELEGY ELLIPTA) 200-62.5-25 MCG/ACT oral inhaler Comments:   Reason for Stopping:         loperamide (IMODIUM) 2 MG capsule Comments:   Reason for Stopping:         multivitamin w/minerals (THERA-VIT-M) tablet Comments:   Reason for Stopping:         ondansetron (ZOFRAN ODT) 4 MG ODT tab Comments:   Reason for Stopping:         sulfamethoxazole-trimethoprim (BACTRIM) 400-80 MG tablet Comments:   Reason for Stopping:             Allergies   Allergies   Allergen Reactions    Levaquin Swelling and Difficulty breathing     Thinks it may have been anaphylaxis    Cats     Dogs     Atorvastatin Calcium Muscle Pain (Myalgia)     Muscle cramps/pain    Clopidogrel Bisulfate Other (See Comments)     Does not remember reaction; may have been \"blotchy skin\"    Hctz [Hydrochlorothiazide] Rash     Rash on legs    Sulfasalazine Cramps     Stomach cramps      "

## 2025-03-12 NOTE — PLAN OF CARE
Aox4. CMS at baseline. SOB with exertion and pivot transfers. On 4L per NC. Sats wnl >92%. Mir catheter in place for urinary retention. Pivot transfers to commode. Pain managed on current regimen. Patient resting comfortably in bed between cares.   Butrans patch on R shoulder. Poor appetite. Drank one ensure. Takes meds whole.   Receiving scheduled nebs. See flowsheets for all health assessment details.     Pt. discharged at 1415 to the [facility], and left with personal belongings. Pt. received complete discharge paperwork and medications prescriptions as filled by discharge pharmacy. Pt. was given times of last dose for all discharge medications in writing on discharge medication sheets. Pt. had no further questions at the time of discharge and no unmet needs were identified. EMS came late, pt left unit at 1415.

## 2025-03-17 ENCOUNTER — DOCUMENTATION ONLY (OUTPATIENT)
Dept: GERIATRICS | Facility: CLINIC | Age: 80
End: 2025-03-17
Payer: MEDICARE

## 2025-03-17 RX ORDER — LEVOTHYROXINE SODIUM 125 UG/1
125 TABLET ORAL DAILY
Qty: 90 TABLET | Refills: 0 | Status: SHIPPED | OUTPATIENT
Start: 2025-03-17

## 2025-03-17 NOTE — PROGRESS NOTES
Patient  on 3/16/25 @ 1110 at [facility].  [facility] was on [facility].

## 2025-03-17 NOTE — TELEPHONE ENCOUNTER
Last Written Prescription Date:  2/26/25  Listed as historical  Last office visit: 3/7/2024    Future Office Visit:     Next 5 appointments (look out 90 days)      Apr 21, 2025 10:00 AM  (Arrive by 9:40 AM)  Provider Visit with Yaneli Dozier MD  [facility] ([facility] ) [address], [address]  [place] [address]  [phone]     Apr 25, 2025 11:00 AM  Nurse Visit with ABELARDO MCCLOUD  [facility] ([facility]) [address] [address]  [phone]           Requested Prescriptions   Pending Prescriptions Disp Refills    levothyroxine (SYNTHROID/LEVOTHROID) 125 MCG tablet [Pharmacy Med Name: LEVOTHYROXINE 125 MCG TABLET] 90 tablet 1     Sig: TAKE 1 TABLET BY MOUTH EVERY DAY       Thyroid Protocol Failed - 3/17/2025 10:56 AM        Failed - Recent (12 mo) or future (90 days) visit within the authorizing provider's specialty     The patient must have completed an in-person or virtual visit within the past 12 months or has a future visit scheduled within the next 90 days with the authorizing provider s specialty.  Urgent care and e-visits do not qualify as an office visit for this protocol.          Failed - Medication indicated for associated diagnosis     Medication is associated with one or more of the following diagnoses:  Hypothyroidism  Thyroid stimulating hormone suppression therapy  Thyroid cancer  Acquired atrophy of thyroid          Failed - Normal TSH on file in past 12 months     Recent Labs   Lab Test 02/26/25  0956   TSH 7.84*              Passed - Patient is 12 years or older        Passed - Medication is active on med list and the sig matches. RN to manually verify dose and sig if red X/fail.     If the protocol passes (green check), you do not need to verify med dose and sig.    A prescription matches if they are the same clinical intention.    For Example: once daily and every morning are the  same.    The protocol can not identify upper and lower case letters as matching and will fail.     For Example: Take 1 tablet (50 mg) by mouth daily     TAKE 1 TABLET (50 MG) BY MOUTH DAILY    For all fails (red x), verify dose and sig.    If the refill does match what is on file, the RN can still proceed to approve the refill request.       If they do not match, route to the appropriate provider.

## 2025-03-24 ENCOUNTER — MEDICAL CORRESPONDENCE (OUTPATIENT)
Dept: HEALTH INFORMATION MANAGEMENT | Facility: CLINIC | Age: 80
End: 2025-03-24
Payer: MEDICARE

## 2025-05-17 NOTE — CONSULTS
Care Management Initial Consult    General Information  Assessment completed with: Patient,    Type of CM/SW Visit: Initial Assessment    Primary Care Provider verified and updated as needed: Yes   Readmission within the last 30 days: previous discharge plan unsuccessful      Reason for Consult: discharge planning  Advance Care Planning: Advance Care Planning Reviewed: present on chart          Communication Assessment  Patient's communication style: spoken language (English or Bilingual)             Cognitive  Cognitive/Neuro/Behavioral: WDL                      Living Environment:   People in home: child(yohan), adult, grandchild(yohan)  daughter Janey, her fiance and her adult son  Current living Arrangements: house      Able to return to prior arrangements: yes       Family/Social Support:  Care provided by: self, child(yohan)  Provides care for: no one  Marital Status:   Children          Description of Support System: Supportive, Involved         Current Resources:   Patient receiving home care services: Yes  Skilled Home Care Services: Physical Therapy, Skilled Nursing  Community Resources: None  Equipment currently used at home: walker, rolling, shower chair, grab bar, tub/shower  Supplies currently used at home: Oxygen Tubing/Supplies, Nebulizer tubing    Employment/Financial:  Employment Status: retired        Financial Concerns: none           Does the patient's insurance plan have a 3 day qualifying hospital stay waiver?  Yes     Which insurance plan 3 day waiver is available? [employer]    Will the waiver be used for post-acute placement? Yes    Lifestyle & Psychosocial Needs:  Social Determinants of Health     Food Insecurity: No Food Insecurity (4/17/2024)    [facility]    Food Insecurity     Worried About Running Out of Food in the Last Year: 1   Depression: At risk (3/19/2024)    PHQ-2     PHQ-2 Score: 3   Housing Stability: Low Risk  (4/17/2024)     Received from [facility]    Housing Stability     Unable to Pay for Housing in the Last Year: 1   Tobacco Use: Medium Risk (4/17/2024)    Patient History     Smoking Tobacco Use: Former     Smokeless Tobacco Use: Never     Passive Exposure: Not on file   Financial Resource Strain: Low Risk  (4/17/2024)    Received from [facility]    Financial Resource Strain     Difficulty of Paying Living Expenses: 3     Difficulty of Paying Living Expenses: Not on file   Alcohol Use: Not on file   Transportation Needs: No Transportation Needs (4/17/2024)    Received from [facility]    Transportation Needs     Lack of Transportation (Medical): 1   Physical Activity: Not on file   Interpersonal Safety: Low Risk  (3/19/2024)    Interpersonal Safety     Do you feel physically and emotionally safe where you currently live?: Yes     Within the past 12 months, have you been hit, slapped, kicked or otherwise physically hurt by someone?: No     Within the past 12 months, have you been humiliated or emotionally abused in other ways by your partner or ex-partner?: No   Stress: Not on file   Social Connections: Socially Integrated (4/17/2024)    Received from [facility]    Social Connections     Frequency of Communication with Friends and Family: 0   Health Literacy: Not on file       Functional Status:  Prior to admission patient needed assistance:   Dependent ADLs:: Ambulation-walker  Dependent IADLs:: Cooking, Laundry, Cleaning, Shopping, Meal Preparation             Values/Beliefs:  Spiritual, Cultural Beliefs, Synagogue Practices, Values that affect care: no               Additional Information:  Writer met with patient and introduced self and role.  Patient states that he has been living with his adult daughter Janey for about a month as he is currently in the process of getting a divorce.  Patient  primarily uses a 4 wheeled walker with seat for ambulation and is on home O2 at 1L continuous which is supplied by [facility].  He is also open to home RN/PT thru [facility].  Janey provides all of his meals and does shopping and household chores.  Patient manages his own meds and is able to dressing and bathe himself.  Patient expressed that he thinks he needs to go somewhere to get stronger before returning home.  Per PT notes, they are recommending TCU at discharge.  Patient would like referrals sent to [facility] and [facility] [address]. SW aware and will make referrals.    Maryanne Hall RN Care Coordinator  [facility]  [phone]        oral

## 2025-06-11 NOTE — TELEPHONE ENCOUNTER
"Requested Prescriptions   Pending Prescriptions Disp Refills     PROAIR  (90 BASE) MCG/ACT inhaler [Pharmacy Med Name: PROAIR HFA 90 MCG INHALER]  Last Written Prescription Date:  12/19/17  Last Fill Quantity: 18g,  # refills: 0   Last Office Visit with [facility] or [facility] prescribing provider:  2/26/18   Future Office Visit:      No flowsheet data found.      8.5 Inhaler 0     Sig: INHALE 2 PUFFS INTO THE LUNGS EVERY 6 HOURS AS NEEDED FOR SHORTNESS OF BREATH / DYSPNEA OR WHEEZING    Asthma Maintenance Inhalers - Anticholinergics Passed    3/26/2018  4:33 PM       Passed - Patient is age 12 years or older       Passed - Recent (12 mo) or future (30 days) visit within the authorizing provider's specialty    Patient had office visit in the last 12 months or has a visit in the next 30 days with authorizing provider or within the authorizing provider's specialty.  See \"Patient Info\" tab in inbasket, or \"Choose Columns\" in Meds & Orders section of the refill encounter.              " no concerns

## (undated) DEVICE — SU VICRYL 3-0 SH 27" J316H

## (undated) DEVICE — LINEN TOWEL PACK X5 5464

## (undated) DEVICE — BLADE KNIFE SURG 10 371110

## (undated) DEVICE — STRAP KNEE/BODY 31143004

## (undated) DEVICE — ESU GROUND PAD UNIVERSAL W/O CORD

## (undated) DEVICE — GLOVE PROTEXIS W/NEU-THERA 7.5  2D73TE75

## (undated) DEVICE — SU SILK 3-0 SH 30" K832H

## (undated) DEVICE — TAPE DRSG UNIVERSAL CLOTH 3" WHITE LATEX 881-3

## (undated) DEVICE — TUBING SUCTION MEDI-VAC 1/4"X20' N620A

## (undated) DEVICE — NDL 22GA 1.5"

## (undated) DEVICE — LUBRICANT INST KIT ENDO-LUBE 220-90

## (undated) DEVICE — CLOSURE SYS SKIN PREMIERPRO EXOFIN FUSION 4X22CM STRL 3472

## (undated) DEVICE — DRAPE CONVERTORS U-DRAPE 60X72" 8476

## (undated) DEVICE — BONE CLEANING TIP INTERPULSE  0210-010-000

## (undated) DEVICE — DRAPE LAP W/ARMBOARD 29410

## (undated) DEVICE — SU ETHICON STRATAFIX SPR PS 3-0 30X30CM SXMP2B412

## (undated) DEVICE — ENDO VALVE SYR NDL KIT ULTRASOUND BRONCH NA-201SX-4022-A

## (undated) DEVICE — GOWN IMPERVIOUS SPECIALTY XLG/XLONG 32474

## (undated) DEVICE — BLADE CLIPPER 4406

## (undated) DEVICE — PACK MINOR SBA15MIFSE

## (undated) DEVICE — SYR 20ML LL W/O NDL

## (undated) DEVICE — SOL WATER IRRIG 1000ML BOTTLE 2F7114

## (undated) DEVICE — DECANTER VIAL 2006S

## (undated) DEVICE — DRAPE POUCH INSTRUMENT 1018

## (undated) DEVICE — SPECIMEN TRAP TISSUE CONTAINER PIRANHA 2208120

## (undated) DEVICE — HOOD SURG T7PLUS PEEL AWAY FACE SHIELD STRL LF 0416-801-100

## (undated) DEVICE — LINEN GOWN OVERSIZE 5408

## (undated) DEVICE — SU MONOCRYL 4-0 P-3 18" UND Y494G

## (undated) DEVICE — SUCTION WATERBUG FLOOR PUDDLE VAC 9321

## (undated) DEVICE — SYR BULB IRRIG 50ML LATEX FREE 0035280

## (undated) DEVICE — SU VICRYL 4-0 PS-2 18" UND J496H

## (undated) DEVICE — PACK UNIVERSAL SPLIT 29131

## (undated) DEVICE — SU PROLENE 5-0 RB-2DA 30" 8710H

## (undated) DEVICE — SOL NACL 0.9% IRRIG 1000ML BOTTLE 2F7124

## (undated) DEVICE — ESU PENCIL W/SMOKE EVAC CVPLP2000

## (undated) DEVICE — SU VICRYL 2-0 CT-1 27" J339H

## (undated) DEVICE — Device

## (undated) DEVICE — SU VICRYL 2-0 CT-2 27" J333H

## (undated) DEVICE — SU VICRYL 2-0 TIE 12X18" J905T

## (undated) DEVICE — ESU PENCIL W/HOLSTER E2350H

## (undated) DEVICE — TUBE ENDOTRACHEAL NIM EMG 8.0MM 8229308

## (undated) DEVICE — SYR 10ML FINGER CONTROL W/O NDL 309695

## (undated) DEVICE — SYR 10ML LL W/O NDL 302995

## (undated) DEVICE — ENDO NDL BX ASPIRATION EBUS 21GA VIZISHOT NA-201SX-4021

## (undated) DEVICE — SUCTION MANIFOLD DORNOCH ULTRA CART UL-CL500

## (undated) DEVICE — MANIFOLD NEPTUNE 4 PORT 700-20

## (undated) DEVICE — DRSG GAUZE 4X8" NON21842

## (undated) DEVICE — GOWN IMPERVIOUS ZONED LG

## (undated) DEVICE — DRAPE IOBAN ISOLATION VERTICAL 6619

## (undated) DEVICE — LINEN GOWN X4 5410

## (undated) DEVICE — SUCTION IRR SYSTEM W/O TIP INTERPULSE HANDPIECE 0210-100-000

## (undated) DEVICE — NDL SPINAL 18GA 3.5" 405184

## (undated) DEVICE — SUCTION MINISQUAIR SMOKE EVAC CAPTURE DEVICE SQ20012-01

## (undated) DEVICE — GLOVE BIOGEL PI SZ 8.0 40880

## (undated) DEVICE — DRAPE MAYO STAND 23X54 8337

## (undated) DEVICE — DRAPE BREAST/CHEST 29420

## (undated) DEVICE — SU VICRYL 4-0 TIE 12X18" DYED J103T

## (undated) DEVICE — SU NDL CUT REV MED 3/8 209014

## (undated) DEVICE — SU VICRYL 1 CT-2 27" J335H

## (undated) DEVICE — ESU ELEC BLADE 2.75" COATED/INSULATED E1455

## (undated) DEVICE — SU VICRYL 1 CTX CR 8X18" J765D

## (undated) DEVICE — GLOVE PROTEXIS W/NEU-THERA 8.0  2D73TE80

## (undated) DEVICE — DRAPE SHEET REV FOLD 3/4 9349

## (undated) DEVICE — DRAPE IOBAN INCISE 23X17" 6650EZ

## (undated) DEVICE — ESU HOLSTER PLASTIC DISP E2400

## (undated) DEVICE — SOL NACL 0.9% IRRIG 3000ML BAG 2B7477

## (undated) DEVICE — SOL NACL 0.9% INJ 1000ML BAG 2B1324X

## (undated) DEVICE — SYSTEM CLEARIFY VISUALIZATION 21-345

## (undated) DEVICE — TUBING SUCTION MEDI-VAC SOFT 3/16"X20' N520A

## (undated) DEVICE — NIM PROBE PRASS STIMULATOR PROTECTED TIP 8225101

## (undated) DEVICE — KIT PATIENT CARE HANA TABLE PROFX SUPINE 6855

## (undated) DEVICE — ESU ELEC BLADE 6" COATED/INSULATED E1455-6

## (undated) DEVICE — POSITIONER HEADREST FOAM DONUT H2INX9IN DIA NS LF DISP HR104

## (undated) DEVICE — SU VICRYL 0 CT-1 27" J340H

## (undated) DEVICE — BLADE KNIFE SURG 15 371115

## (undated) DEVICE — SU MONOCRYL 4-0 PS-2 18" UND Y496G

## (undated) DEVICE — ENDO TROCAR THORACIC 10/12MM TT012

## (undated) DEVICE — ANTIFOG SOLUTION W/FOAM PAD 31142527

## (undated) DEVICE — NDL 19GA 1.5"

## (undated) DEVICE — FILTER PIRANHA DISP 2228.901

## (undated) DEVICE — SU VICRYL 3-0 SH 27" UND J416H

## (undated) DEVICE — BLADE MORCELLATOR WOLF PIRANHA 4.75X385MM 49700103

## (undated) DEVICE — DRSG STERI STRIP 1/2X4" R1547

## (undated) DEVICE — DRSG GAUZE 4X4" 3033

## (undated) DEVICE — SUCTION DRY CHEST DRAIN OASIS 3600-100

## (undated) DEVICE — SYR 10ML SLIP TIP W/O NDL

## (undated) DEVICE — SYR 50ML LL W/O NDL 309653

## (undated) DEVICE — BLADE SAW SAGITTAL STRK 29X83.5X1.27MM 4/2000 2108-183-000

## (undated) DEVICE — PREP CHLORAPREP W/ORANGE TINT 10.5ML 930715

## (undated) DEVICE — GLOVE PROTEXIS W/NEU-THERA 6.5  2D73TE65

## (undated) DEVICE — SU DERMABOND MINI DHVM12

## (undated) DEVICE — DRSG TEGADERM 4X4 3/4" 1626

## (undated) DEVICE — TUBING SET PIRANHA 41702208

## (undated) DEVICE — DECANTER BAG 2002S

## (undated) DEVICE — DRSG STERI STRIP 1/4X3" R1541

## (undated) DEVICE — DRAPE STERI U 1015

## (undated) DEVICE — DRSG KERLIX 4 1/2"X4YDS ROLL 6715

## (undated) DEVICE — ESU ELEC BLADE 6" COATED E1450-6

## (undated) DEVICE — DRAPE IOBAN ISOLATION VERTICAL 320X21CM 6617

## (undated) DEVICE — SU DERMABOND PRINEO 22CM CLR222US

## (undated) DEVICE — PREP CHLORAPREP 26ML TINTED ORANGE  260815

## (undated) DEVICE — CONNECTOR STOPCOCK 3 WAY MALE LL 2C6204

## (undated) DEVICE — CATH SECURE 5445-3

## (undated) DEVICE — BLADE SAW SAGITTAL STRK 25X79.5X1.24MM 4/2000 2108-318-000

## (undated) DEVICE — BLADE SAW SAGITTAL STRK 18X90X1.27MM HD SYS 6 6118-127-090

## (undated) DEVICE — SU SILK 2 REEL 60" SA8H

## (undated) DEVICE — DRAPE THYROID SHEET 29522

## (undated) DEVICE — SUCTION TIP YANKAUER W/O VENT K86

## (undated) DEVICE — SYR 70ML TOOMEY 041170

## (undated) DEVICE — SU VICRYL 2-0 CT-1 27" UND J259H

## (undated) DEVICE — BAG URINARY DRAIN LUBRISIL IC 4000ML LF 253509A

## (undated) DEVICE — NDL 18GA 1.5" 305196

## (undated) DEVICE — SUCTION CANISTER MEDIVAC LINER 3000ML W/LID 65651-530

## (undated) DEVICE — PACK TOTAL HIP W/U DRAPE SOP15HUFSC

## (undated) DEVICE — ESU CORD MONOPOLAR HIGH FREQUENCY 26006M-D/10

## (undated) DEVICE — DRAPE C-ARM 60X42" 1013

## (undated) DEVICE — SYR EAR BULB 3OZ 0035830

## (undated) DEVICE — DRSG ABDOMINAL 07 1/2X8" 7197D

## (undated) DEVICE — DRAIN PENROSE 0.25"X18" LATEX FREE GR201

## (undated) DEVICE — GLOVE BIOGEL PI MICRO INDICATOR UNDERGLOVE SZ 8.0 48980

## (undated) DEVICE — CATH FOLEY 3WAY 22FR 30ML SIL 73022SI

## (undated) DEVICE — ENDO VALVE SUCTION BRONCH EVIS MAJ-209

## (undated) DEVICE — BONE WAX 2.5GM W31G

## (undated) DEVICE — GLOVE PROTEXIS BLUE W/NEU-THERA 7.5  2D73EB75

## (undated) DEVICE — JELLY LUBRICATING SURGILUBE 2OZ TUBE 0281-0205-02

## (undated) DEVICE — ESU CORD MONOPOLAR 10'  E0510

## (undated) DEVICE — CATH LASER URETERAL 7.1FRX40CM G17797  022403-7.1-40

## (undated) DEVICE — DRSG TELFA 3X8" 1238

## (undated) DEVICE — SU ETHIBOND 2 V-37 4X30" MX69G

## (undated) DEVICE — ESU BIPOLAR SEALER AQUAMANTYS 6MM 23-112-1

## (undated) DEVICE — SURGICEL HEMOSTAT 3X4" NUKNIT 1943

## (undated) DEVICE — SPONGE RAY-TEC 4X8" 7318

## (undated) DEVICE — PAD CHUX UNDERPAD 30X36" P3036C

## (undated) DEVICE — SOL NACL 0.9% INJ 250ML BAG 2B1322Q

## (undated) DEVICE — SOLUTION WOUND CLEANSING 3/4OZ 10% PVP EA-L3011FB-50

## (undated) DEVICE — SUCTION TIP YANKAUER STR K87

## (undated) DEVICE — SURGICEL FIBRILLAR HEMOSTAT 1"X2" 1961

## (undated) RX ORDER — FENTANYL CITRATE 50 UG/ML
INJECTION, SOLUTION INTRAMUSCULAR; INTRAVENOUS
Status: DISPENSED
Start: 2021-07-02

## (undated) RX ORDER — EPHEDRINE SULFATE 50 MG/ML
INJECTION, SOLUTION INTRAMUSCULAR; INTRAVENOUS; SUBCUTANEOUS
Status: DISPENSED
Start: 2023-06-14

## (undated) RX ORDER — GLYCOPYRROLATE 0.2 MG/ML
INJECTION, SOLUTION INTRAMUSCULAR; INTRAVENOUS
Status: DISPENSED
Start: 2021-08-13

## (undated) RX ORDER — FENTANYL CITRATE 50 UG/ML
INJECTION, SOLUTION INTRAMUSCULAR; INTRAVENOUS
Status: DISPENSED
Start: 2019-04-18

## (undated) RX ORDER — PROPOFOL 10 MG/ML
INJECTION, EMULSION INTRAVENOUS
Status: DISPENSED
Start: 2022-01-21

## (undated) RX ORDER — PROPOFOL 10 MG/ML
INJECTION, EMULSION INTRAVENOUS
Status: DISPENSED
Start: 2021-07-02

## (undated) RX ORDER — FENTANYL CITRATE 0.05 MG/ML
INJECTION, SOLUTION INTRAMUSCULAR; INTRAVENOUS
Status: DISPENSED
Start: 2021-08-13

## (undated) RX ORDER — CEFAZOLIN SODIUM 2 G/100ML
INJECTION, SOLUTION INTRAVENOUS
Status: DISPENSED
Start: 2021-07-20

## (undated) RX ORDER — PROPOFOL 10 MG/ML
INJECTION, EMULSION INTRAVENOUS
Status: DISPENSED
Start: 2021-06-22

## (undated) RX ORDER — HYDROMORPHONE HCL IN WATER/PF 6 MG/30 ML
PATIENT CONTROLLED ANALGESIA SYRINGE INTRAVENOUS
Status: DISPENSED
Start: 2021-07-20

## (undated) RX ORDER — DEXAMETHASONE SODIUM PHOSPHATE 4 MG/ML
INJECTION, SOLUTION INTRA-ARTICULAR; INTRALESIONAL; INTRAMUSCULAR; INTRAVENOUS; SOFT TISSUE
Status: DISPENSED
Start: 2021-07-02

## (undated) RX ORDER — HYDROMORPHONE HYDROCHLORIDE 1 MG/ML
INJECTION, SOLUTION INTRAMUSCULAR; INTRAVENOUS; SUBCUTANEOUS
Status: DISPENSED
Start: 2023-06-14

## (undated) RX ORDER — FENTANYL CITRATE 50 UG/ML
INJECTION, SOLUTION INTRAMUSCULAR; INTRAVENOUS
Status: DISPENSED
Start: 2022-01-21

## (undated) RX ORDER — ONDANSETRON 2 MG/ML
INJECTION INTRAMUSCULAR; INTRAVENOUS
Status: DISPENSED
Start: 2021-07-02

## (undated) RX ORDER — FENTANYL CITRATE 50 UG/ML
INJECTION, SOLUTION INTRAMUSCULAR; INTRAVENOUS
Status: DISPENSED
Start: 2019-07-30

## (undated) RX ORDER — GABAPENTIN 300 MG/1
CAPSULE ORAL
Status: DISPENSED
Start: 2019-04-18

## (undated) RX ORDER — ACETAMINOPHEN 325 MG/1
TABLET ORAL
Status: DISPENSED
Start: 2019-04-18

## (undated) RX ORDER — FLUMAZENIL 0.1 MG/ML
INJECTION, SOLUTION INTRAVENOUS
Status: DISPENSED
Start: 2022-02-11

## (undated) RX ORDER — ONDANSETRON 2 MG/ML
INJECTION INTRAMUSCULAR; INTRAVENOUS
Status: DISPENSED
Start: 2021-06-22

## (undated) RX ORDER — APREPITANT 40 MG/1
CAPSULE ORAL
Status: DISPENSED
Start: 2021-08-13

## (undated) RX ORDER — FENTANYL CITRATE 50 UG/ML
INJECTION, SOLUTION INTRAMUSCULAR; INTRAVENOUS
Status: DISPENSED
Start: 2021-06-22

## (undated) RX ORDER — PROPOFOL 10 MG/ML
INJECTION, EMULSION INTRAVENOUS
Status: DISPENSED
Start: 2021-08-13

## (undated) RX ORDER — HYDROCODONE BITARTRATE AND ACETAMINOPHEN 5; 325 MG/1; MG/1
TABLET ORAL
Status: DISPENSED
Start: 2021-07-20

## (undated) RX ORDER — FENTANYL CITRATE 50 UG/ML
INJECTION, SOLUTION INTRAMUSCULAR; INTRAVENOUS
Status: DISPENSED
Start: 2021-07-20

## (undated) RX ORDER — HYDROMORPHONE HYDROCHLORIDE 1 MG/ML
INJECTION, SOLUTION INTRAMUSCULAR; INTRAVENOUS; SUBCUTANEOUS
Status: DISPENSED
Start: 2021-07-20

## (undated) RX ORDER — ONDANSETRON 2 MG/ML
INJECTION INTRAMUSCULAR; INTRAVENOUS
Status: DISPENSED
Start: 2023-06-14

## (undated) RX ORDER — HYDROMORPHONE HYDROCHLORIDE 1 MG/ML
INJECTION, SOLUTION INTRAMUSCULAR; INTRAVENOUS; SUBCUTANEOUS
Status: DISPENSED
Start: 2021-08-13

## (undated) RX ORDER — BUPIVACAINE HYDROCHLORIDE 5 MG/ML
INJECTION, SOLUTION EPIDURAL; INTRACAUDAL
Status: DISPENSED
Start: 2022-01-21

## (undated) RX ORDER — DEXAMETHASONE SODIUM PHOSPHATE 4 MG/ML
INJECTION, SOLUTION INTRA-ARTICULAR; INTRALESIONAL; INTRAMUSCULAR; INTRAVENOUS; SOFT TISSUE
Status: DISPENSED
Start: 2021-07-20

## (undated) RX ORDER — NALOXONE HYDROCHLORIDE 0.4 MG/ML
INJECTION, SOLUTION INTRAMUSCULAR; INTRAVENOUS; SUBCUTANEOUS
Status: DISPENSED
Start: 2022-02-11

## (undated) RX ORDER — ONDANSETRON 2 MG/ML
INJECTION INTRAMUSCULAR; INTRAVENOUS
Status: DISPENSED
Start: 2021-08-13

## (undated) RX ORDER — DEXAMETHASONE SODIUM PHOSPHATE 4 MG/ML
INJECTION, SOLUTION INTRA-ARTICULAR; INTRALESIONAL; INTRAMUSCULAR; INTRAVENOUS; SOFT TISSUE
Status: DISPENSED
Start: 2021-08-13

## (undated) RX ORDER — APREPITANT 40 MG/1
CAPSULE ORAL
Status: DISPENSED
Start: 2022-01-21

## (undated) RX ORDER — VANCOMYCIN HYDROCHLORIDE 1 G/20ML
INJECTION, POWDER, LYOPHILIZED, FOR SOLUTION INTRAVENOUS
Status: DISPENSED
Start: 2023-06-14

## (undated) RX ORDER — LIDOCAINE HYDROCHLORIDE 20 MG/ML
INJECTION, SOLUTION EPIDURAL; INFILTRATION; INTRACAUDAL; PERINEURAL
Status: DISPENSED
Start: 2021-07-02

## (undated) RX ORDER — KETAMINE HCL IN NACL, ISO-OSM 100MG/10ML
SYRINGE (ML) INJECTION
Status: DISPENSED
Start: 2021-08-13

## (undated) RX ORDER — ALBUTEROL SULFATE 0.83 MG/ML
SOLUTION RESPIRATORY (INHALATION)
Status: DISPENSED
Start: 2017-06-01

## (undated) RX ORDER — KETAMINE HCL IN NACL, ISO-OSM 100MG/10ML
SYRINGE (ML) INJECTION
Status: DISPENSED
Start: 2022-01-21

## (undated) RX ORDER — ALBUTEROL SULFATE 90 UG/1
AEROSOL, METERED RESPIRATORY (INHALATION)
Status: DISPENSED
Start: 2021-08-13

## (undated) RX ORDER — GLYCOPYRROLATE 0.2 MG/ML
INJECTION, SOLUTION INTRAMUSCULAR; INTRAVENOUS
Status: DISPENSED
Start: 2021-06-22

## (undated) RX ORDER — FENTANYL CITRATE 50 UG/ML
INJECTION, SOLUTION INTRAMUSCULAR; INTRAVENOUS
Status: DISPENSED
Start: 2023-06-14

## (undated) RX ORDER — PROPOFOL 10 MG/ML
INJECTION, EMULSION INTRAVENOUS
Status: DISPENSED
Start: 2023-06-14

## (undated) RX ORDER — CEFAZOLIN SODIUM 2 G/100ML
INJECTION, SOLUTION INTRAVENOUS
Status: DISPENSED
Start: 2021-08-13

## (undated) RX ORDER — LIDOCAINE HYDROCHLORIDE 20 MG/ML
INJECTION, SOLUTION EPIDURAL; INFILTRATION; INTRACAUDAL; PERINEURAL
Status: DISPENSED
Start: 2021-08-13

## (undated) RX ORDER — LIDOCAINE HYDROCHLORIDE 20 MG/ML
INJECTION, SOLUTION EPIDURAL; INFILTRATION; INTRACAUDAL; PERINEURAL
Status: DISPENSED
Start: 2021-06-22

## (undated) RX ORDER — REGADENOSON 0.08 MG/ML
INJECTION, SOLUTION INTRAVENOUS
Status: DISPENSED
Start: 2018-12-21

## (undated) RX ORDER — FENTANYL CITRATE 50 UG/ML
INJECTION, SOLUTION INTRAMUSCULAR; INTRAVENOUS
Status: DISPENSED
Start: 2022-02-11

## (undated) RX ORDER — LIDOCAINE HYDROCHLORIDE 20 MG/ML
INJECTION, SOLUTION EPIDURAL; INFILTRATION; INTRACAUDAL; PERINEURAL
Status: DISPENSED
Start: 2021-07-20

## (undated) RX ORDER — PROPOFOL 10 MG/ML
INJECTION, EMULSION INTRAVENOUS
Status: DISPENSED
Start: 2021-07-20

## (undated) RX ORDER — DEXAMETHASONE SODIUM PHOSPHATE 4 MG/ML
INJECTION, SOLUTION INTRA-ARTICULAR; INTRALESIONAL; INTRAMUSCULAR; INTRAVENOUS; SOFT TISSUE
Status: DISPENSED
Start: 2021-06-22

## (undated) RX ORDER — FENTANYL CITRATE 50 UG/ML
INJECTION, SOLUTION INTRAMUSCULAR; INTRAVENOUS
Status: DISPENSED
Start: 2021-08-13

## (undated) RX ORDER — CEFAZOLIN SODIUM 2 G/100ML
INJECTION, SOLUTION INTRAVENOUS
Status: DISPENSED
Start: 2022-01-21

## (undated) RX ORDER — DEXAMETHASONE SODIUM PHOSPHATE 4 MG/ML
INJECTION, SOLUTION INTRA-ARTICULAR; INTRALESIONAL; INTRAMUSCULAR; INTRAVENOUS; SOFT TISSUE
Status: DISPENSED
Start: 2023-06-14

## (undated) RX ORDER — ALBUTEROL SULFATE 90 UG/1
AEROSOL, METERED RESPIRATORY (INHALATION)
Status: DISPENSED
Start: 2021-07-02

## (undated) RX ORDER — FUROSEMIDE 10 MG/ML
INJECTION INTRAMUSCULAR; INTRAVENOUS
Status: DISPENSED
Start: 2019-04-18

## (undated) RX ORDER — NEOSTIGMINE METHYLSULFATE 1 MG/ML
VIAL (ML) INJECTION
Status: DISPENSED
Start: 2021-06-22

## (undated) RX ORDER — ONDANSETRON 2 MG/ML
INJECTION INTRAMUSCULAR; INTRAVENOUS
Status: DISPENSED
Start: 2021-07-20

## (undated) RX ORDER — ALBUTEROL SULFATE 90 UG/1
AEROSOL, METERED RESPIRATORY (INHALATION)
Status: DISPENSED
Start: 2022-01-21